# Patient Record
Sex: FEMALE | Race: WHITE | Employment: OTHER | URBAN - METROPOLITAN AREA
[De-identification: names, ages, dates, MRNs, and addresses within clinical notes are randomized per-mention and may not be internally consistent; named-entity substitution may affect disease eponyms.]

---

## 2017-01-18 ENCOUNTER — TELEPHONE (OUTPATIENT)
Dept: CARDIOLOGY | Age: 82
End: 2017-01-18

## 2017-01-23 ENCOUNTER — TELEPHONE (OUTPATIENT)
Dept: CARDIOLOGY | Age: 82
End: 2017-01-23

## 2017-02-21 RX ORDER — DILTIAZEM HYDROCHLORIDE 120 MG/1
CAPSULE, EXTENDED RELEASE ORAL
Qty: 90 CAPSULE | Refills: 2 | Status: SHIPPED | OUTPATIENT
Start: 2017-02-21 | End: 2017-06-23 | Stop reason: SDUPTHER

## 2017-06-13 ENCOUNTER — HOSPITAL ENCOUNTER (OUTPATIENT)
Dept: CT IMAGING | Age: 82
Discharge: HOME | End: 2017-06-13
Payer: MEDICARE

## 2017-06-13 VITALS
DIASTOLIC BLOOD PRESSURE: 97 MMHG | HEART RATE: 71 BPM | HEIGHT: 60 IN | WEIGHT: 166 LBS | SYSTOLIC BLOOD PRESSURE: 158 MMHG | BODY MASS INDEX: 32.59 KG/M2 | RESPIRATION RATE: 16 BRPM

## 2017-06-13 DIAGNOSIS — I71.21 ASCENDING AORTIC ANEURYSM: ICD-10-CM

## 2017-06-13 LAB
GFR AFRICAN AMERICAN: >60
GFR NON-AFRICAN AMERICAN: >60
PERFORMED ON: NORMAL
POC CREATININE: 0.6 MG/DL (ref 0.6–1.2)
POC SAMPLE TYPE: NORMAL

## 2017-06-13 PROCEDURE — 6360000004 HC RX CONTRAST MEDICATION: Performed by: RADIOLOGY

## 2017-06-13 PROCEDURE — 71260 CT THORAX DX C+: CPT

## 2017-06-13 RX ORDER — SODIUM CHLORIDE 0.9 % (FLUSH) 0.9 %
10 SYRINGE (ML) INJECTION
Status: DISPENSED | OUTPATIENT
Start: 2017-06-13 | End: 2017-06-13

## 2017-06-13 RX ADMIN — IOPAMIDOL 100 ML: 755 INJECTION, SOLUTION INTRAVENOUS at 09:55

## 2017-06-23 ENCOUNTER — OFFICE VISIT (OUTPATIENT)
Dept: CARDIOLOGY | Age: 82
End: 2017-06-23

## 2017-06-23 VITALS
HEART RATE: 64 BPM | BODY MASS INDEX: 33.79 KG/M2 | RESPIRATION RATE: 16 BRPM | WEIGHT: 173 LBS | SYSTOLIC BLOOD PRESSURE: 130 MMHG | DIASTOLIC BLOOD PRESSURE: 82 MMHG | OXYGEN SATURATION: 95 %

## 2017-06-23 DIAGNOSIS — I71.9 DESCENDING AORTIC ANEURYSM (HCC): ICD-10-CM

## 2017-06-23 DIAGNOSIS — I71.21 ASCENDING AORTIC ANEURYSM: ICD-10-CM

## 2017-06-23 DIAGNOSIS — I10 ESSENTIAL HYPERTENSION: Primary | ICD-10-CM

## 2017-06-23 PROCEDURE — 99213 OFFICE O/P EST LOW 20 MIN: CPT | Performed by: INTERNAL MEDICINE

## 2017-06-23 PROCEDURE — 1123F ACP DISCUSS/DSCN MKR DOCD: CPT | Performed by: INTERNAL MEDICINE

## 2017-06-23 PROCEDURE — G8427 DOCREV CUR MEDS BY ELIG CLIN: HCPCS | Performed by: INTERNAL MEDICINE

## 2017-06-23 PROCEDURE — G8399 PT W/DXA RESULTS DOCUMENT: HCPCS | Performed by: INTERNAL MEDICINE

## 2017-06-23 PROCEDURE — 1036F TOBACCO NON-USER: CPT | Performed by: INTERNAL MEDICINE

## 2017-06-23 PROCEDURE — 1090F PRES/ABSN URINE INCON ASSESS: CPT | Performed by: INTERNAL MEDICINE

## 2017-06-23 PROCEDURE — G8419 CALC BMI OUT NRM PARAM NOF/U: HCPCS | Performed by: INTERNAL MEDICINE

## 2017-06-23 PROCEDURE — 4040F PNEUMOC VAC/ADMIN/RCVD: CPT | Performed by: INTERNAL MEDICINE

## 2017-06-23 RX ORDER — LOSARTAN POTASSIUM 50 MG/1
50 TABLET ORAL 2 TIMES DAILY
Status: ON HOLD | COMMUNITY
Start: 2017-04-27 | End: 2021-03-01 | Stop reason: HOSPADM

## 2017-06-27 ENCOUNTER — TELEPHONE (OUTPATIENT)
Dept: CARDIOLOGY | Age: 82
End: 2017-06-27

## 2017-06-27 DIAGNOSIS — R06.02 SHORTNESS OF BREATH: ICD-10-CM

## 2017-06-27 DIAGNOSIS — R53.83 FATIGUE, UNSPECIFIED TYPE: ICD-10-CM

## 2017-06-27 DIAGNOSIS — I71.21 ASCENDING AORTIC ANEURYSM: ICD-10-CM

## 2017-06-27 DIAGNOSIS — Z74.09 IMPAIRED MOBILITY AND ACTIVITIES OF DAILY LIVING: ICD-10-CM

## 2017-06-27 DIAGNOSIS — Z78.9 IMPAIRED MOBILITY AND ACTIVITIES OF DAILY LIVING: ICD-10-CM

## 2017-06-27 DIAGNOSIS — I10 ESSENTIAL HYPERTENSION: Primary | ICD-10-CM

## 2017-06-27 RX ORDER — DILTIAZEM HYDROCHLORIDE 120 MG/1
CAPSULE, EXTENDED RELEASE ORAL
Qty: 90 CAPSULE | Refills: 3 | Status: SHIPPED | OUTPATIENT
Start: 2017-06-27 | End: 2017-08-11 | Stop reason: DRUGHIGH

## 2017-07-12 ENCOUNTER — HOSPITAL ENCOUNTER (OUTPATIENT)
Dept: NUCLEAR MEDICINE | Age: 82
Discharge: HOME | End: 2017-07-12
Payer: MEDICARE

## 2017-07-12 DIAGNOSIS — Z74.09 IMPAIRED MOBILITY AND ACTIVITIES OF DAILY LIVING: ICD-10-CM

## 2017-07-12 DIAGNOSIS — Z78.9 IMPAIRED MOBILITY AND ACTIVITIES OF DAILY LIVING: ICD-10-CM

## 2017-07-12 DIAGNOSIS — I10 ESSENTIAL HYPERTENSION: ICD-10-CM

## 2017-07-12 DIAGNOSIS — R53.83 FATIGUE, UNSPECIFIED TYPE: ICD-10-CM

## 2017-07-12 DIAGNOSIS — R06.02 SHORTNESS OF BREATH: ICD-10-CM

## 2017-07-12 PROCEDURE — 93017 CV STRESS TEST TRACING ONLY: CPT

## 2017-07-12 PROCEDURE — 6360000002 HC RX W HCPCS: Performed by: INTERNAL MEDICINE

## 2017-07-12 PROCEDURE — 2580000003 HC RX 258: Performed by: INTERNAL MEDICINE

## 2017-07-12 PROCEDURE — 3430000000 HC RX DIAGNOSTIC RADIOPHARMACEUTICAL: Performed by: INTERNAL MEDICINE

## 2017-07-12 PROCEDURE — A9502 TC99M TETROFOSMIN: HCPCS | Performed by: INTERNAL MEDICINE

## 2017-07-12 PROCEDURE — 78452 HT MUSCLE IMAGE SPECT MULT: CPT

## 2017-07-12 RX ORDER — SODIUM CHLORIDE 0.9 % (FLUSH) 0.9 %
10 SYRINGE (ML) INJECTION PRN
Status: DISCONTINUED | OUTPATIENT
Start: 2017-07-12 | End: 2017-07-15 | Stop reason: HOSPADM

## 2017-07-12 RX ADMIN — REGADENOSON 0.4 MG: 0.08 INJECTION, SOLUTION INTRAVENOUS at 13:07

## 2017-07-12 RX ADMIN — Medication 10 ML: at 11:51

## 2017-07-12 RX ADMIN — Medication 20 ML: at 13:07

## 2017-07-12 RX ADMIN — TETROFOSMIN 11.5 MILLICURIE: 0.23 INJECTION, POWDER, LYOPHILIZED, FOR SOLUTION INTRAVENOUS at 11:05

## 2017-07-12 RX ADMIN — TETROFOSMIN 28.8 MILLICURIE: 0.23 INJECTION, POWDER, LYOPHILIZED, FOR SOLUTION INTRAVENOUS at 13:07

## 2017-07-13 PROCEDURE — 93018 CV STRESS TEST I&R ONLY: CPT | Performed by: INTERNAL MEDICINE

## 2017-07-19 ENCOUNTER — HOSPITAL ENCOUNTER (INPATIENT)
Age: 82
LOS: 6 days | Discharge: HOME | DRG: 273 | End: 2017-07-26
Attending: INTERNAL MEDICINE | Admitting: INTERNAL MEDICINE
Payer: MEDICARE

## 2017-07-19 ENCOUNTER — APPOINTMENT (OUTPATIENT)
Dept: GENERAL RADIOLOGY | Age: 82
DRG: 273 | End: 2017-07-19
Payer: MEDICARE

## 2017-07-19 ENCOUNTER — APPOINTMENT (OUTPATIENT)
Dept: CT IMAGING | Age: 82
DRG: 273 | End: 2017-07-19
Payer: MEDICARE

## 2017-07-19 DIAGNOSIS — R11.0 NAUSEA: ICD-10-CM

## 2017-07-19 DIAGNOSIS — G95.9 MYELOPATHY (HCC): ICD-10-CM

## 2017-07-19 DIAGNOSIS — I71.9 DESCENDING AORTIC ANEURYSM (HCC): ICD-10-CM

## 2017-07-19 DIAGNOSIS — R07.89 ATYPICAL CHEST PAIN: Primary | ICD-10-CM

## 2017-07-19 DIAGNOSIS — R42 DIZZINESS: ICD-10-CM

## 2017-07-19 PROBLEM — R77.8 ELEVATED TROPONIN: Status: ACTIVE | Noted: 2017-07-19

## 2017-07-19 LAB
ALBUMIN SERPL-MCNC: 3.7 G/DL (ref 3.9–4.9)
ALP BLD-CCNC: 74 U/L (ref 40–130)
ALT SERPL-CCNC: 23 U/L (ref 0–33)
ANION GAP SERPL CALCULATED.3IONS-SCNC: 11 MEQ/L (ref 7–13)
APTT: 24.5 SEC (ref 21.6–35.4)
AST SERPL-CCNC: 28 U/L (ref 0–35)
BILIRUB SERPL-MCNC: 0.4 MG/DL (ref 0–1.2)
BUN BLDV-MCNC: 23 MG/DL (ref 8–23)
CALCIUM SERPL-MCNC: 8.9 MG/DL (ref 8.6–10.2)
CHLORIDE BLD-SCNC: 107 MEQ/L (ref 98–107)
CO2: 22 MEQ/L (ref 22–29)
CREAT SERPL-MCNC: 0.64 MG/DL (ref 0.5–0.9)
D DIMER: 1.08 MG/L FEU (ref 0–0.5)
GFR AFRICAN AMERICAN: >60
GFR NON-AFRICAN AMERICAN: >60
GLOBULIN: 2.5 G/DL (ref 2.3–3.5)
GLUCOSE BLD-MCNC: 119 MG/DL (ref 74–109)
HCT VFR BLD CALC: 39.5 % (ref 37–47)
HCT VFR BLD CALC: 42.9 % (ref 37–47)
HEMOGLOBIN: 13 G/DL (ref 12–16)
HEMOGLOBIN: 14.1 G/DL (ref 12–16)
INR BLD: 1
INR BLD: 1
LV EF: 50 %
LVEF MODALITY: NORMAL
MCH RBC QN AUTO: 29.2 PG (ref 27–31.3)
MCH RBC QN AUTO: 29.4 PG (ref 27–31.3)
MCHC RBC AUTO-ENTMCNC: 32.9 % (ref 33–37)
MCHC RBC AUTO-ENTMCNC: 32.9 % (ref 33–37)
MCV RBC AUTO: 88.6 FL (ref 82–100)
MCV RBC AUTO: 89.5 FL (ref 82–100)
PDW BLD-RTO: 13.6 % (ref 11.5–14.5)
PDW BLD-RTO: 13.6 % (ref 11.5–14.5)
PLATELET # BLD: 159 K/UL (ref 130–400)
PLATELET # BLD: 173 K/UL (ref 130–400)
POTASSIUM SERPL-SCNC: 4 MEQ/L (ref 3.5–5.1)
PRO-BNP: 1691 PG/ML
PROTHROMBIN TIME: 10.6 SEC (ref 8.1–13.7)
PROTHROMBIN TIME: 10.9 SEC (ref 8.1–13.7)
RBC # BLD: 4.41 M/UL (ref 4.2–5.4)
RBC # BLD: 4.84 M/UL (ref 4.2–5.4)
SODIUM BLD-SCNC: 140 MEQ/L (ref 132–144)
TOTAL CK: 91 U/L (ref 0–170)
TOTAL PROTEIN: 6.2 G/DL (ref 6.4–8.1)
TROPONIN: 0.04 NG/ML (ref 0–0.01)
TROPONIN: 0.17 NG/ML (ref 0–0.01)
TROPONIN: 0.2 NG/ML (ref 0–0.01)
TSH SERPL DL<=0.05 MIU/L-ACNC: 3.29 UIU/ML (ref 0.27–4.2)
WBC # BLD: 8.1 K/UL (ref 4.8–10.8)
WBC # BLD: 8.6 K/UL (ref 4.8–10.8)

## 2017-07-19 PROCEDURE — 87077 CULTURE AEROBIC IDENTIFY: CPT

## 2017-07-19 PROCEDURE — 99284 EMERGENCY DEPT VISIT MOD MDM: CPT

## 2017-07-19 PROCEDURE — 85379 FIBRIN DEGRADATION QUANT: CPT

## 2017-07-19 PROCEDURE — 80053 COMPREHEN METABOLIC PANEL: CPT

## 2017-07-19 PROCEDURE — 99233 SBSQ HOSP IP/OBS HIGH 50: CPT | Performed by: INTERNAL MEDICINE

## 2017-07-19 PROCEDURE — 70450 CT HEAD/BRAIN W/O DYE: CPT

## 2017-07-19 PROCEDURE — 85027 COMPLETE CBC AUTOMATED: CPT

## 2017-07-19 PROCEDURE — 2580000003 HC RX 258: Performed by: PHYSICIAN ASSISTANT

## 2017-07-19 PROCEDURE — 83880 ASSAY OF NATRIURETIC PEPTIDE: CPT

## 2017-07-19 PROCEDURE — 84443 ASSAY THYROID STIM HORMONE: CPT

## 2017-07-19 PROCEDURE — 84484 ASSAY OF TROPONIN QUANT: CPT

## 2017-07-19 PROCEDURE — G0378 HOSPITAL OBSERVATION PER HR: HCPCS

## 2017-07-19 PROCEDURE — 93005 ELECTROCARDIOGRAM TRACING: CPT

## 2017-07-19 PROCEDURE — 93306 TTE W/DOPPLER COMPLETE: CPT

## 2017-07-19 PROCEDURE — 6370000000 HC RX 637 (ALT 250 FOR IP): Performed by: PHYSICIAN ASSISTANT

## 2017-07-19 PROCEDURE — 85610 PROTHROMBIN TIME: CPT

## 2017-07-19 PROCEDURE — 71010 XR CHEST PORTABLE: CPT

## 2017-07-19 PROCEDURE — 87186 SC STD MICRODIL/AGAR DIL: CPT

## 2017-07-19 PROCEDURE — 87086 URINE CULTURE/COLONY COUNT: CPT

## 2017-07-19 PROCEDURE — 85730 THROMBOPLASTIN TIME PARTIAL: CPT

## 2017-07-19 PROCEDURE — 96372 THER/PROPH/DIAG INJ SC/IM: CPT

## 2017-07-19 PROCEDURE — 6360000002 HC RX W HCPCS: Performed by: PHYSICIAN ASSISTANT

## 2017-07-19 PROCEDURE — 82550 ASSAY OF CK (CPK): CPT

## 2017-07-19 PROCEDURE — 36415 COLL VENOUS BLD VENIPUNCTURE: CPT

## 2017-07-19 RX ORDER — NITROGLYCERIN 0.4 MG/1
0.4 TABLET SUBLINGUAL EVERY 5 MIN PRN
Status: DISCONTINUED | OUTPATIENT
Start: 2017-07-19 | End: 2017-07-26 | Stop reason: HOSPADM

## 2017-07-19 RX ORDER — SODIUM CHLORIDE 0.9 % (FLUSH) 0.9 %
10 SYRINGE (ML) INJECTION EVERY 12 HOURS SCHEDULED
Status: DISCONTINUED | OUTPATIENT
Start: 2017-07-19 | End: 2017-07-20

## 2017-07-19 RX ORDER — CITALOPRAM 20 MG/1
20 TABLET ORAL DAILY
Status: DISCONTINUED | OUTPATIENT
Start: 2017-07-19 | End: 2017-07-22

## 2017-07-19 RX ORDER — SODIUM CHLORIDE 9 MG/ML
INJECTION, SOLUTION INTRAVENOUS CONTINUOUS
Status: DISCONTINUED | OUTPATIENT
Start: 2017-07-20 | End: 2017-07-20

## 2017-07-19 RX ORDER — ACETAMINOPHEN 325 MG/1
650 TABLET ORAL EVERY 4 HOURS PRN
Status: DISCONTINUED | OUTPATIENT
Start: 2017-07-19 | End: 2017-07-26 | Stop reason: HOSPADM

## 2017-07-19 RX ORDER — HYDROCHLOROTHIAZIDE 25 MG/1
25 TABLET ORAL DAILY
Status: DISCONTINUED | OUTPATIENT
Start: 2017-07-19 | End: 2017-07-26 | Stop reason: HOSPADM

## 2017-07-19 RX ORDER — SODIUM CHLORIDE 9 MG/ML
INJECTION, SOLUTION INTRAVENOUS CONTINUOUS
Status: DISCONTINUED | OUTPATIENT
Start: 2017-07-19 | End: 2017-07-19

## 2017-07-19 RX ORDER — ASPIRIN 81 MG/1
324 TABLET, CHEWABLE ORAL ONCE
Status: COMPLETED | OUTPATIENT
Start: 2017-07-19 | End: 2017-07-19

## 2017-07-19 RX ORDER — SODIUM CHLORIDE 0.9 % (FLUSH) 0.9 %
10 SYRINGE (ML) INJECTION PRN
Status: DISCONTINUED | OUTPATIENT
Start: 2017-07-19 | End: 2017-07-20

## 2017-07-19 RX ORDER — LEVOTHYROXINE SODIUM 88 UG/1
88 TABLET ORAL DAILY
Status: DISCONTINUED | OUTPATIENT
Start: 2017-07-19 | End: 2017-07-22

## 2017-07-19 RX ORDER — ASPIRIN 81 MG/1
81 TABLET, CHEWABLE ORAL DAILY
Status: DISCONTINUED | OUTPATIENT
Start: 2017-07-19 | End: 2017-07-26 | Stop reason: HOSPADM

## 2017-07-19 RX ORDER — MORPHINE SULFATE 2 MG/ML
2 INJECTION, SOLUTION INTRAMUSCULAR; INTRAVENOUS
Status: DISCONTINUED | OUTPATIENT
Start: 2017-07-19 | End: 2017-07-26 | Stop reason: HOSPADM

## 2017-07-19 RX ORDER — ONDANSETRON 2 MG/ML
4 INJECTION INTRAMUSCULAR; INTRAVENOUS EVERY 6 HOURS PRN
Status: DISCONTINUED | OUTPATIENT
Start: 2017-07-19 | End: 2017-07-20

## 2017-07-19 RX ORDER — SODIUM CHLORIDE 0.9 % (FLUSH) 0.9 %
10 SYRINGE (ML) INJECTION EVERY 12 HOURS SCHEDULED
Status: DISCONTINUED | OUTPATIENT
Start: 2017-07-19 | End: 2017-07-26 | Stop reason: HOSPADM

## 2017-07-19 RX ORDER — DIPHENHYDRAMINE HCL 25 MG
50 TABLET ORAL ONCE
Status: DISCONTINUED | OUTPATIENT
Start: 2017-07-19 | End: 2017-07-26 | Stop reason: HOSPADM

## 2017-07-19 RX ORDER — ALPRAZOLAM 0.5 MG/1
0.5 TABLET ORAL
Status: ACTIVE | OUTPATIENT
Start: 2017-07-19 | End: 2017-07-19

## 2017-07-19 RX ORDER — ASPIRIN 81 MG/1
81 TABLET ORAL ONCE
Status: DISCONTINUED | OUTPATIENT
Start: 2017-07-19 | End: 2017-07-19 | Stop reason: SDUPTHER

## 2017-07-19 RX ORDER — LOSARTAN POTASSIUM 50 MG/1
50 TABLET ORAL 2 TIMES DAILY
Status: DISCONTINUED | OUTPATIENT
Start: 2017-07-19 | End: 2017-07-22

## 2017-07-19 RX ORDER — ALENDRONATE SODIUM 70 MG/1
70 TABLET ORAL
Status: DISCONTINUED | OUTPATIENT
Start: 2017-07-19 | End: 2017-07-19

## 2017-07-19 RX ORDER — DILTIAZEM HYDROCHLORIDE 120 MG/1
120 CAPSULE, COATED, EXTENDED RELEASE ORAL DAILY
Status: DISCONTINUED | OUTPATIENT
Start: 2017-07-19 | End: 2017-07-22

## 2017-07-19 RX ORDER — MORPHINE SULFATE 4 MG/ML
4 INJECTION, SOLUTION INTRAMUSCULAR; INTRAVENOUS
Status: DISCONTINUED | OUTPATIENT
Start: 2017-07-19 | End: 2017-07-26 | Stop reason: HOSPADM

## 2017-07-19 RX ORDER — GABAPENTIN 100 MG/1
100 CAPSULE ORAL EVERY EVENING
Status: DISCONTINUED | OUTPATIENT
Start: 2017-07-19 | End: 2017-07-26 | Stop reason: HOSPADM

## 2017-07-19 RX ADMIN — NITROGLYCERIN 0.5 INCH: 20 OINTMENT TOPICAL at 13:48

## 2017-07-19 RX ADMIN — ASPIRIN 81 MG 324 MG: 81 TABLET ORAL at 13:46

## 2017-07-19 RX ADMIN — ENOXAPARIN SODIUM 80 MG: 80 INJECTION SUBCUTANEOUS at 21:21

## 2017-07-19 RX ADMIN — LOSARTAN POTASSIUM 50 MG: 50 TABLET ORAL at 21:22

## 2017-07-19 RX ADMIN — Medication 10 ML: at 21:23

## 2017-07-19 RX ADMIN — SODIUM CHLORIDE: 9 INJECTION, SOLUTION INTRAVENOUS at 12:24

## 2017-07-19 RX ADMIN — GABAPENTIN 100 MG: 100 CAPSULE ORAL at 21:22

## 2017-07-19 RX ADMIN — ASPIRIN 81 MG CHEWABLE TABLET 81 MG: 81 TABLET CHEWABLE at 21:24

## 2017-07-19 ASSESSMENT — ENCOUNTER SYMPTOMS
EYE DISCHARGE: 0
COUGH: 0
NAUSEA: 0
CONSTIPATION: 0
CHOKING: 0
STRIDOR: 0
RHINORRHEA: 0
ABDOMINAL DISTENTION: 0
NAUSEA: 1
SHORTNESS OF BREATH: 1
COLOR CHANGE: 0
SORE THROAT: 0
VOMITING: 0
SHORTNESS OF BREATH: 0
WHEEZING: 0
ABDOMINAL PAIN: 0
APNEA: 0
CHEST TIGHTNESS: 0

## 2017-07-19 ASSESSMENT — PAIN DESCRIPTION - FREQUENCY: FREQUENCY: CONTINUOUS

## 2017-07-19 ASSESSMENT — PAIN SCALES - GENERAL
PAINLEVEL_OUTOF10: 0
PAINLEVEL_OUTOF10: 0
PAINLEVEL_OUTOF10: 2
PAINLEVEL_OUTOF10: 8

## 2017-07-19 ASSESSMENT — PAIN DESCRIPTION - DESCRIPTORS: DESCRIPTORS: HEADACHE;SQUEEZING

## 2017-07-19 ASSESSMENT — PAIN DESCRIPTION - PAIN TYPE: TYPE: ACUTE PAIN

## 2017-07-20 ENCOUNTER — APPOINTMENT (OUTPATIENT)
Dept: CARDIAC CATH/INVASIVE PROCEDURES | Age: 82
DRG: 273 | End: 2017-07-20
Payer: MEDICARE

## 2017-07-20 ENCOUNTER — APPOINTMENT (OUTPATIENT)
Dept: CT IMAGING | Age: 82
DRG: 273 | End: 2017-07-20
Payer: MEDICARE

## 2017-07-20 LAB
ANION GAP SERPL CALCULATED.3IONS-SCNC: 6 MEQ/L (ref 7–13)
BUN BLDV-MCNC: 17 MG/DL (ref 8–23)
CALCIUM SERPL-MCNC: 8.3 MG/DL (ref 8.6–10.2)
CHLORIDE BLD-SCNC: 106 MEQ/L (ref 98–107)
CHOLESTEROL, TOTAL: 161 MG/DL (ref 0–199)
CO2: 27 MEQ/L (ref 22–29)
CREAT SERPL-MCNC: 0.44 MG/DL (ref 0.5–0.9)
GFR AFRICAN AMERICAN: >60
GFR NON-AFRICAN AMERICAN: >60
GLUCOSE BLD-MCNC: 88 MG/DL (ref 74–109)
HCT VFR BLD CALC: 41.7 % (ref 37–47)
HDLC SERPL-MCNC: 47 MG/DL (ref 40–59)
HEMOGLOBIN: 13.5 G/DL (ref 12–16)
INR BLD: 1
LDL CHOLESTEROL CALCULATED: 92 MG/DL (ref 0–129)
MCH RBC QN AUTO: 28.7 PG (ref 27–31.3)
MCHC RBC AUTO-ENTMCNC: 32.5 % (ref 33–37)
MCV RBC AUTO: 88.4 FL (ref 82–100)
PDW BLD-RTO: 13.6 % (ref 11.5–14.5)
PLATELET # BLD: 152 K/UL (ref 130–400)
POTASSIUM SERPL-SCNC: 3.4 MEQ/L (ref 3.5–5.1)
PROTHROMBIN TIME: 11.2 SEC (ref 8.1–13.7)
RBC # BLD: 4.71 M/UL (ref 4.2–5.4)
SODIUM BLD-SCNC: 139 MEQ/L (ref 132–144)
TRIGL SERPL-MCNC: 109 MG/DL (ref 0–200)
WBC # BLD: 7.7 K/UL (ref 4.8–10.8)

## 2017-07-20 PROCEDURE — 2580000003 HC RX 258

## 2017-07-20 PROCEDURE — C1725 CATH, TRANSLUMIN NON-LASER: HCPCS

## 2017-07-20 PROCEDURE — C1769 GUIDE WIRE: HCPCS

## 2017-07-20 PROCEDURE — 6360000004 HC RX CONTRAST MEDICATION: Performed by: RADIOLOGY

## 2017-07-20 PROCEDURE — C1894 INTRO/SHEATH, NON-LASER: HCPCS

## 2017-07-20 PROCEDURE — 2720000010 HC SURG SUPPLY STERILE

## 2017-07-20 PROCEDURE — 4A033BC MEASUREMENT OF ARTERIAL PRESSURE, CORONARY, PERCUTANEOUS APPROACH: ICD-10-PCS | Performed by: INTERNAL MEDICINE

## 2017-07-20 PROCEDURE — 80048 BASIC METABOLIC PNL TOTAL CA: CPT

## 2017-07-20 PROCEDURE — 2700000000 HC OXYGEN THERAPY PER DAY

## 2017-07-20 PROCEDURE — 2580000003 HC RX 258: Performed by: PHYSICIAN ASSISTANT

## 2017-07-20 PROCEDURE — B2111ZZ FLUOROSCOPY OF MULTIPLE CORONARY ARTERIES USING LOW OSMOLAR CONTRAST: ICD-10-PCS | Performed by: INTERNAL MEDICINE

## 2017-07-20 PROCEDURE — 6370000000 HC RX 637 (ALT 250 FOR IP): Performed by: INTERNAL MEDICINE

## 2017-07-20 PROCEDURE — 93005 ELECTROCARDIOGRAM TRACING: CPT

## 2017-07-20 PROCEDURE — 80061 LIPID PANEL: CPT

## 2017-07-20 PROCEDURE — 4A023N7 MEASUREMENT OF CARDIAC SAMPLING AND PRESSURE, LEFT HEART, PERCUTANEOUS APPROACH: ICD-10-PCS | Performed by: INTERNAL MEDICINE

## 2017-07-20 PROCEDURE — 93458 L HRT ARTERY/VENTRICLE ANGIO: CPT | Performed by: INTERNAL MEDICINE

## 2017-07-20 PROCEDURE — 93571 IV DOP VEL&/PRESS C FLO 1ST: CPT | Performed by: INTERNAL MEDICINE

## 2017-07-20 PROCEDURE — 85610 PROTHROMBIN TIME: CPT

## 2017-07-20 PROCEDURE — 6360000002 HC RX W HCPCS

## 2017-07-20 PROCEDURE — 6370000000 HC RX 637 (ALT 250 FOR IP): Performed by: NURSE PRACTITIONER

## 2017-07-20 PROCEDURE — 85027 COMPLETE CBC AUTOMATED: CPT

## 2017-07-20 PROCEDURE — 36415 COLL VENOUS BLD VENIPUNCTURE: CPT

## 2017-07-20 PROCEDURE — 71275 CT ANGIOGRAPHY CHEST: CPT

## 2017-07-20 PROCEDURE — 2060000000 HC ICU INTERMEDIATE R&B

## 2017-07-20 PROCEDURE — 2500000003 HC RX 250 WO HCPCS

## 2017-07-20 PROCEDURE — 6370000000 HC RX 637 (ALT 250 FOR IP): Performed by: PHYSICIAN ASSISTANT

## 2017-07-20 PROCEDURE — B2151ZZ FLUOROSCOPY OF LEFT HEART USING LOW OSMOLAR CONTRAST: ICD-10-PCS | Performed by: INTERNAL MEDICINE

## 2017-07-20 RX ORDER — POTASSIUM CHLORIDE 20 MEQ/1
40 TABLET, EXTENDED RELEASE ORAL ONCE
Status: COMPLETED | OUTPATIENT
Start: 2017-07-20 | End: 2017-07-20

## 2017-07-20 RX ORDER — ONDANSETRON 2 MG/ML
4 INJECTION INTRAMUSCULAR; INTRAVENOUS EVERY 6 HOURS PRN
Status: DISCONTINUED | OUTPATIENT
Start: 2017-07-20 | End: 2017-07-26 | Stop reason: HOSPADM

## 2017-07-20 RX ORDER — SODIUM CHLORIDE 9 MG/ML
INJECTION, SOLUTION INTRAVENOUS CONTINUOUS
Status: ACTIVE | OUTPATIENT
Start: 2017-07-20 | End: 2017-07-20

## 2017-07-20 RX ORDER — LABETALOL HYDROCHLORIDE 5 MG/ML
10 INJECTION, SOLUTION INTRAVENOUS EVERY 30 MIN PRN
Status: DISCONTINUED | OUTPATIENT
Start: 2017-07-20 | End: 2017-07-26 | Stop reason: HOSPADM

## 2017-07-20 RX ORDER — ACETAMINOPHEN 325 MG/1
650 TABLET ORAL EVERY 4 HOURS PRN
Status: DISCONTINUED | OUTPATIENT
Start: 2017-07-20 | End: 2017-07-26 | Stop reason: HOSPADM

## 2017-07-20 RX ORDER — HYDRALAZINE HYDROCHLORIDE 20 MG/ML
10 INJECTION INTRAMUSCULAR; INTRAVENOUS EVERY 10 MIN PRN
Status: DISCONTINUED | OUTPATIENT
Start: 2017-07-20 | End: 2017-07-26 | Stop reason: HOSPADM

## 2017-07-20 RX ADMIN — HYDROCHLOROTHIAZIDE 25 MG: 25 TABLET ORAL at 07:55

## 2017-07-20 RX ADMIN — POTASSIUM CHLORIDE 40 MEQ: 1500 TABLET, EXTENDED RELEASE ORAL at 07:58

## 2017-07-20 RX ADMIN — SODIUM CHLORIDE: 9 INJECTION, SOLUTION INTRAVENOUS at 10:05

## 2017-07-20 RX ADMIN — ASPIRIN 81 MG CHEWABLE TABLET 81 MG: 81 TABLET CHEWABLE at 07:55

## 2017-07-20 RX ADMIN — IOPAMIDOL 100 ML: 755 INJECTION, SOLUTION INTRAVENOUS at 08:32

## 2017-07-20 RX ADMIN — GABAPENTIN 100 MG: 100 CAPSULE ORAL at 21:35

## 2017-07-20 RX ADMIN — CITALOPRAM 20 MG: 20 TABLET ORAL at 07:55

## 2017-07-20 RX ADMIN — ACETAMINOPHEN 650 MG: 325 TABLET ORAL at 20:22

## 2017-07-20 RX ADMIN — LOSARTAN POTASSIUM 50 MG: 50 TABLET ORAL at 07:55

## 2017-07-20 RX ADMIN — LEVOTHYROXINE SODIUM 88 MCG: 88 TABLET ORAL at 07:55

## 2017-07-20 RX ADMIN — SODIUM CHLORIDE, PRESERVATIVE FREE 10 ML: 5 INJECTION INTRAVENOUS at 08:32

## 2017-07-20 RX ADMIN — DILTIAZEM HYDROCHLORIDE 120 MG: 120 CAPSULE, COATED, EXTENDED RELEASE ORAL at 07:55

## 2017-07-20 ASSESSMENT — PAIN SCALES - GENERAL
PAINLEVEL_OUTOF10: 0
PAINLEVEL_OUTOF10: 0
PAINLEVEL_OUTOF10: 6
PAINLEVEL_OUTOF10: 0
PAINLEVEL_OUTOF10: 0

## 2017-07-21 ENCOUNTER — APPOINTMENT (OUTPATIENT)
Dept: ULTRASOUND IMAGING | Age: 82
DRG: 273 | End: 2017-07-21
Payer: MEDICARE

## 2017-07-21 LAB
ANION GAP SERPL CALCULATED.3IONS-SCNC: 12 MEQ/L (ref 7–13)
BUN BLDV-MCNC: 14 MG/DL (ref 8–23)
CALCIUM SERPL-MCNC: 9.1 MG/DL (ref 8.6–10.2)
CHLORIDE BLD-SCNC: 104 MEQ/L (ref 98–107)
CO2: 24 MEQ/L (ref 22–29)
CREAT SERPL-MCNC: 0.6 MG/DL (ref 0.5–0.9)
GFR AFRICAN AMERICAN: >60
GFR NON-AFRICAN AMERICAN: >60
GLUCOSE BLD-MCNC: 134 MG/DL (ref 74–109)
MAGNESIUM: 1.9 MG/DL (ref 1.7–2.3)
POTASSIUM SERPL-SCNC: 3.6 MEQ/L (ref 3.5–5.1)
SODIUM BLD-SCNC: 140 MEQ/L (ref 132–144)

## 2017-07-21 PROCEDURE — 93005 ELECTROCARDIOGRAM TRACING: CPT

## 2017-07-21 PROCEDURE — 6360000002 HC RX W HCPCS: Performed by: PHYSICIAN ASSISTANT

## 2017-07-21 PROCEDURE — 83735 ASSAY OF MAGNESIUM: CPT

## 2017-07-21 PROCEDURE — 36415 COLL VENOUS BLD VENIPUNCTURE: CPT

## 2017-07-21 PROCEDURE — 6370000000 HC RX 637 (ALT 250 FOR IP): Performed by: PHYSICIAN ASSISTANT

## 2017-07-21 PROCEDURE — 97162 PT EVAL MOD COMPLEX 30 MIN: CPT

## 2017-07-21 PROCEDURE — 6370000000 HC RX 637 (ALT 250 FOR IP): Performed by: PSYCHIATRY & NEUROLOGY

## 2017-07-21 PROCEDURE — 93880 EXTRACRANIAL BILAT STUDY: CPT

## 2017-07-21 PROCEDURE — 97165 OT EVAL LOW COMPLEX 30 MIN: CPT

## 2017-07-21 PROCEDURE — G8988 SELF CARE GOAL STATUS: HCPCS

## 2017-07-21 PROCEDURE — G8979 MOBILITY GOAL STATUS: HCPCS

## 2017-07-21 PROCEDURE — G8987 SELF CARE CURRENT STATUS: HCPCS

## 2017-07-21 PROCEDURE — 2060000000 HC ICU INTERMEDIATE R&B

## 2017-07-21 PROCEDURE — 80048 BASIC METABOLIC PNL TOTAL CA: CPT

## 2017-07-21 PROCEDURE — G8978 MOBILITY CURRENT STATUS: HCPCS

## 2017-07-21 PROCEDURE — 2580000003 HC RX 258: Performed by: PHYSICIAN ASSISTANT

## 2017-07-21 RX ORDER — MECLIZINE HYDROCHLORIDE 25 MG/1
25 TABLET ORAL 3 TIMES DAILY PRN
Status: DISCONTINUED | OUTPATIENT
Start: 2017-07-21 | End: 2017-07-21

## 2017-07-21 RX ORDER — MECLIZINE HYDROCHLORIDE 25 MG/1
25 TABLET ORAL 3 TIMES DAILY
Status: DISCONTINUED | OUTPATIENT
Start: 2017-07-21 | End: 2017-07-26 | Stop reason: HOSPADM

## 2017-07-21 RX ADMIN — HYDROCHLOROTHIAZIDE 25 MG: 25 TABLET ORAL at 08:47

## 2017-07-21 RX ADMIN — CITALOPRAM 20 MG: 20 TABLET ORAL at 08:49

## 2017-07-21 RX ADMIN — LOSARTAN POTASSIUM 50 MG: 50 TABLET ORAL at 08:53

## 2017-07-21 RX ADMIN — ENOXAPARIN SODIUM 80 MG: 80 INJECTION SUBCUTANEOUS at 08:54

## 2017-07-21 RX ADMIN — DILTIAZEM HYDROCHLORIDE 120 MG: 120 CAPSULE, COATED, EXTENDED RELEASE ORAL at 08:49

## 2017-07-21 RX ADMIN — ASPIRIN 81 MG CHEWABLE TABLET 81 MG: 81 TABLET CHEWABLE at 08:47

## 2017-07-21 RX ADMIN — Medication 10 ML: at 21:27

## 2017-07-21 RX ADMIN — ENOXAPARIN SODIUM 80 MG: 80 INJECTION SUBCUTANEOUS at 21:27

## 2017-07-21 RX ADMIN — MECLIZINE HYDROCHLORIDE 25 MG: 25 TABLET ORAL at 21:27

## 2017-07-21 RX ADMIN — GABAPENTIN 100 MG: 100 CAPSULE ORAL at 21:27

## 2017-07-21 RX ADMIN — LEVOTHYROXINE SODIUM 88 MCG: 88 TABLET ORAL at 08:53

## 2017-07-21 RX ADMIN — Medication 10 ML: at 08:54

## 2017-07-21 ASSESSMENT — PAIN SCALES - GENERAL
PAINLEVEL_OUTOF10: 0

## 2017-07-22 LAB
ANION GAP SERPL CALCULATED.3IONS-SCNC: 18 MEQ/L (ref 7–13)
BUN BLDV-MCNC: 16 MG/DL (ref 8–23)
CALCIUM SERPL-MCNC: 8.3 MG/DL (ref 8.6–10.2)
CHLORIDE BLD-SCNC: 103 MEQ/L (ref 98–107)
CO2: 20 MEQ/L (ref 22–29)
CREAT SERPL-MCNC: 0.5 MG/DL (ref 0.5–0.9)
GFR AFRICAN AMERICAN: >60
GFR NON-AFRICAN AMERICAN: >60
GLUCOSE BLD-MCNC: 121 MG/DL (ref 74–109)
MAGNESIUM: 2 MG/DL (ref 1.7–2.3)
POTASSIUM SERPL-SCNC: 3.3 MEQ/L (ref 3.5–5.1)
SODIUM BLD-SCNC: 141 MEQ/L (ref 132–144)
TROPONIN: 0.03 NG/ML (ref 0–0.01)

## 2017-07-22 PROCEDURE — 36415 COLL VENOUS BLD VENIPUNCTURE: CPT

## 2017-07-22 PROCEDURE — 6370000000 HC RX 637 (ALT 250 FOR IP): Performed by: PSYCHIATRY & NEUROLOGY

## 2017-07-22 PROCEDURE — 84484 ASSAY OF TROPONIN QUANT: CPT

## 2017-07-22 PROCEDURE — 2580000003 HC RX 258: Performed by: INTERNAL MEDICINE

## 2017-07-22 PROCEDURE — 93005 ELECTROCARDIOGRAM TRACING: CPT

## 2017-07-22 PROCEDURE — 83735 ASSAY OF MAGNESIUM: CPT

## 2017-07-22 PROCEDURE — 6360000002 HC RX W HCPCS: Performed by: PHYSICIAN ASSISTANT

## 2017-07-22 PROCEDURE — 6360000002 HC RX W HCPCS: Performed by: INTERNAL MEDICINE

## 2017-07-22 PROCEDURE — 6370000000 HC RX 637 (ALT 250 FOR IP): Performed by: SPECIALIST

## 2017-07-22 PROCEDURE — 2000000000 HC ICU R&B

## 2017-07-22 PROCEDURE — 6370000000 HC RX 637 (ALT 250 FOR IP): Performed by: PHYSICIAN ASSISTANT

## 2017-07-22 PROCEDURE — 80048 BASIC METABOLIC PNL TOTAL CA: CPT

## 2017-07-22 PROCEDURE — 2580000003 HC RX 258: Performed by: PHYSICIAN ASSISTANT

## 2017-07-22 PROCEDURE — 6360000002 HC RX W HCPCS

## 2017-07-22 PROCEDURE — 2580000003 HC RX 258

## 2017-07-22 RX ORDER — AMIODARONE HYDROCHLORIDE 50 MG/ML
INJECTION, SOLUTION INTRAVENOUS
Status: COMPLETED | OUTPATIENT
Start: 2017-07-22 | End: 2017-07-22

## 2017-07-22 RX ORDER — CITALOPRAM 20 MG/1
20 TABLET ORAL DAILY
Status: DISCONTINUED | OUTPATIENT
Start: 2017-07-22 | End: 2017-07-26 | Stop reason: HOSPADM

## 2017-07-22 RX ORDER — LEVOTHYROXINE SODIUM 88 UG/1
88 TABLET ORAL DAILY
Status: DISCONTINUED | OUTPATIENT
Start: 2017-07-22 | End: 2017-07-26 | Stop reason: HOSPADM

## 2017-07-22 RX ORDER — SODIUM CHLORIDE 9 MG/ML
INJECTION, SOLUTION INTRAVENOUS CONTINUOUS PRN
Status: COMPLETED | OUTPATIENT
Start: 2017-07-22 | End: 2017-07-22

## 2017-07-22 RX ORDER — LOSARTAN POTASSIUM 50 MG/1
50 TABLET ORAL 2 TIMES DAILY
Status: DISCONTINUED | OUTPATIENT
Start: 2017-07-22 | End: 2017-07-26 | Stop reason: HOSPADM

## 2017-07-22 RX ADMIN — LOSARTAN POTASSIUM 50 MG: 50 TABLET, FILM COATED ORAL at 14:59

## 2017-07-22 RX ADMIN — ENOXAPARIN SODIUM 80 MG: 80 INJECTION SUBCUTANEOUS at 20:43

## 2017-07-22 RX ADMIN — ENOXAPARIN SODIUM 80 MG: 80 INJECTION SUBCUTANEOUS at 11:32

## 2017-07-22 RX ADMIN — AMIODARONE HYDROCHLORIDE 1 MG/MIN: 50 INJECTION, SOLUTION INTRAVENOUS at 08:00

## 2017-07-22 RX ADMIN — MECLIZINE HYDROCHLORIDE 25 MG: 25 TABLET ORAL at 20:41

## 2017-07-22 RX ADMIN — SODIUM CHLORIDE 125 ML/HR: 900 INJECTION, SOLUTION INTRAVENOUS at 08:05

## 2017-07-22 RX ADMIN — AMIODARONE HYDROCHLORIDE 150 MG: 50 INJECTION, SOLUTION INTRAVENOUS at 08:00

## 2017-07-22 RX ADMIN — GABAPENTIN 100 MG: 100 CAPSULE ORAL at 20:41

## 2017-07-22 RX ADMIN — CITALOPRAM HYDROBROMIDE 20 MG: 20 TABLET ORAL at 14:59

## 2017-07-22 RX ADMIN — LEVOTHYROXINE SODIUM 88 MCG: 88 TABLET ORAL at 14:59

## 2017-07-22 RX ADMIN — AMIODARONE HYDROCHLORIDE 150 MG: 50 INJECTION, SOLUTION INTRAVENOUS at 08:05

## 2017-07-22 RX ADMIN — ASPIRIN 81 MG CHEWABLE TABLET 81 MG: 81 TABLET CHEWABLE at 11:31

## 2017-07-22 RX ADMIN — LOSARTAN POTASSIUM 50 MG: 50 TABLET, FILM COATED ORAL at 20:44

## 2017-07-22 RX ADMIN — MECLIZINE HYDROCHLORIDE 25 MG: 25 TABLET ORAL at 15:00

## 2017-07-22 RX ADMIN — MECLIZINE HYDROCHLORIDE 25 MG: 25 TABLET ORAL at 11:31

## 2017-07-22 RX ADMIN — HYDROCHLOROTHIAZIDE 25 MG: 25 TABLET ORAL at 11:31

## 2017-07-22 ASSESSMENT — PAIN SCALES - GENERAL
PAINLEVEL_OUTOF10: 0

## 2017-07-22 ASSESSMENT — ENCOUNTER SYMPTOMS
CHEST TIGHTNESS: 0
VOMITING: 0
APNEA: 0
COUGH: 0
COLOR CHANGE: 0
SHORTNESS OF BREATH: 1
WHEEZING: 0
NAUSEA: 0
ABDOMINAL PAIN: 0

## 2017-07-23 LAB
BACTERIA: ABNORMAL /HPF
BILIRUBIN URINE: NEGATIVE
BLOOD, URINE: ABNORMAL
CLARITY: CLEAR
COLOR: YELLOW
CRYSTALS, UA: ABNORMAL
EPITHELIAL CELLS, UA: ABNORMAL /HPF
GLUCOSE URINE: NEGATIVE MG/DL
KETONES, URINE: NEGATIVE MG/DL
LEUKOCYTE ESTERASE, URINE: ABNORMAL
NITRITE, URINE: NEGATIVE
PH UA: 6.5 (ref 5–9)
PROTEIN UA: NEGATIVE MG/DL
RBC UA: ABNORMAL /HPF (ref 0–2)
RENAL EPITHELIAL, UA: ABNORMAL /HPF
SPECIFIC GRAVITY UA: 1.01 (ref 1–1.03)
URINE REFLEX TO CULTURE: YES
UROBILINOGEN, URINE: 0.2 E.U./DL
WBC UA: ABNORMAL /HPF (ref 0–5)

## 2017-07-23 PROCEDURE — 6370000000 HC RX 637 (ALT 250 FOR IP): Performed by: PSYCHIATRY & NEUROLOGY

## 2017-07-23 PROCEDURE — 6360000002 HC RX W HCPCS: Performed by: PHYSICIAN ASSISTANT

## 2017-07-23 PROCEDURE — 2700000000 HC OXYGEN THERAPY PER DAY

## 2017-07-23 PROCEDURE — 93005 ELECTROCARDIOGRAM TRACING: CPT

## 2017-07-23 PROCEDURE — 2580000003 HC RX 258: Performed by: PHYSICIAN ASSISTANT

## 2017-07-23 PROCEDURE — 6370000000 HC RX 637 (ALT 250 FOR IP): Performed by: SPECIALIST

## 2017-07-23 PROCEDURE — 81001 URINALYSIS AUTO W/SCOPE: CPT

## 2017-07-23 PROCEDURE — 2000000000 HC ICU R&B

## 2017-07-23 PROCEDURE — 6370000000 HC RX 637 (ALT 250 FOR IP): Performed by: PHYSICIAN ASSISTANT

## 2017-07-23 RX ORDER — POTASSIUM CHLORIDE 20 MEQ/1
20 TABLET, EXTENDED RELEASE ORAL
Status: DISCONTINUED | OUTPATIENT
Start: 2017-07-23 | End: 2017-07-26 | Stop reason: HOSPADM

## 2017-07-23 RX ORDER — POTASSIUM CHLORIDE 7.45 MG/ML
20 INJECTION INTRAVENOUS ONCE
Status: DISCONTINUED | OUTPATIENT
Start: 2017-07-23 | End: 2017-07-26 | Stop reason: HOSPADM

## 2017-07-23 RX ADMIN — HYDROCHLOROTHIAZIDE 25 MG: 25 TABLET ORAL at 09:38

## 2017-07-23 RX ADMIN — LOSARTAN POTASSIUM 50 MG: 50 TABLET, FILM COATED ORAL at 20:23

## 2017-07-23 RX ADMIN — POTASSIUM CHLORIDE 20 MEQ: 20 TABLET, EXTENDED RELEASE ORAL at 17:24

## 2017-07-23 RX ADMIN — ENOXAPARIN SODIUM 80 MG: 80 INJECTION SUBCUTANEOUS at 09:38

## 2017-07-23 RX ADMIN — MECLIZINE HYDROCHLORIDE 25 MG: 25 TABLET ORAL at 14:27

## 2017-07-23 RX ADMIN — LEVOTHYROXINE SODIUM 88 MCG: 88 TABLET ORAL at 09:38

## 2017-07-23 RX ADMIN — MECLIZINE HYDROCHLORIDE 25 MG: 25 TABLET ORAL at 20:23

## 2017-07-23 RX ADMIN — Medication 10 ML: at 09:00

## 2017-07-23 RX ADMIN — LOSARTAN POTASSIUM 50 MG: 50 TABLET, FILM COATED ORAL at 09:41

## 2017-07-23 RX ADMIN — GABAPENTIN 100 MG: 100 CAPSULE ORAL at 20:23

## 2017-07-23 RX ADMIN — MECLIZINE HYDROCHLORIDE 25 MG: 25 TABLET ORAL at 09:38

## 2017-07-23 RX ADMIN — ASPIRIN 81 MG CHEWABLE TABLET 81 MG: 81 TABLET CHEWABLE at 09:38

## 2017-07-23 RX ADMIN — Medication 10 ML: at 20:23

## 2017-07-23 RX ADMIN — CITALOPRAM HYDROBROMIDE 20 MG: 20 TABLET ORAL at 09:38

## 2017-07-23 ASSESSMENT — ENCOUNTER SYMPTOMS
COUGH: 0
WHEEZING: 0
SHORTNESS OF BREATH: 1
APNEA: 0
NAUSEA: 0
CHEST TIGHTNESS: 0
ABDOMINAL PAIN: 0
VOMITING: 0
COLOR CHANGE: 0

## 2017-07-23 ASSESSMENT — PAIN SCALES - GENERAL
PAINLEVEL_OUTOF10: 0

## 2017-07-24 ENCOUNTER — APPOINTMENT (OUTPATIENT)
Dept: GENERAL RADIOLOGY | Age: 82
DRG: 273 | End: 2017-07-24
Payer: MEDICARE

## 2017-07-24 ENCOUNTER — APPOINTMENT (OUTPATIENT)
Dept: CARDIAC CATH/INVASIVE PROCEDURES | Age: 82
DRG: 273 | End: 2017-07-24
Payer: MEDICARE

## 2017-07-24 ENCOUNTER — APPOINTMENT (OUTPATIENT)
Dept: MRI IMAGING | Age: 82
DRG: 273 | End: 2017-07-24
Payer: MEDICARE

## 2017-07-24 LAB
ANION GAP SERPL CALCULATED.3IONS-SCNC: 11 MEQ/L (ref 7–13)
BUN BLDV-MCNC: 21 MG/DL (ref 8–23)
CALCIUM SERPL-MCNC: 8.7 MG/DL (ref 8.6–10.2)
CHLORIDE BLD-SCNC: 106 MEQ/L (ref 98–107)
CHOLESTEROL, TOTAL: 185 MG/DL (ref 0–199)
CO2: 24 MEQ/L (ref 22–29)
CREAT SERPL-MCNC: 0.6 MG/DL (ref 0.5–0.9)
GFR AFRICAN AMERICAN: >60
GFR NON-AFRICAN AMERICAN: >60
GLUCOSE BLD-MCNC: 96 MG/DL (ref 74–109)
HCT VFR BLD CALC: 45 % (ref 37–47)
HDLC SERPL-MCNC: 45 MG/DL (ref 40–59)
HEMOGLOBIN: 15.1 G/DL (ref 12–16)
INR BLD: 1
LDL CHOLESTEROL CALCULATED: 119 MG/DL (ref 0–129)
MCH RBC QN AUTO: 29.9 PG (ref 27–31.3)
MCHC RBC AUTO-ENTMCNC: 33.5 % (ref 33–37)
MCV RBC AUTO: 89.2 FL (ref 82–100)
PDW BLD-RTO: 13.7 % (ref 11.5–14.5)
PLATELET # BLD: 165 K/UL (ref 130–400)
POTASSIUM SERPL-SCNC: 3.4 MEQ/L (ref 3.5–5.1)
PROTHROMBIN TIME: 10.8 SEC (ref 8.1–13.7)
RBC # BLD: 5.04 M/UL (ref 4.2–5.4)
SODIUM BLD-SCNC: 141 MEQ/L (ref 132–144)
TRIGL SERPL-MCNC: 106 MG/DL (ref 0–200)
WBC # BLD: 8.4 K/UL (ref 4.8–10.8)

## 2017-07-24 PROCEDURE — 2580000003 HC RX 258: Performed by: PHYSICIAN ASSISTANT

## 2017-07-24 PROCEDURE — 93619 COMPREHENSIVE EP EVALUATION: CPT | Performed by: SPECIALIST

## 2017-07-24 PROCEDURE — 02583ZZ DESTRUCTION OF CONDUCTION MECHANISM, PERCUTANEOUS APPROACH: ICD-10-PCS | Performed by: SPECIALIST

## 2017-07-24 PROCEDURE — 93005 ELECTROCARDIOGRAM TRACING: CPT

## 2017-07-24 PROCEDURE — 6360000002 HC RX W HCPCS: Performed by: PHYSICIAN ASSISTANT

## 2017-07-24 PROCEDURE — 80061 LIPID PANEL: CPT

## 2017-07-24 PROCEDURE — 70544 MR ANGIOGRAPHY HEAD W/O DYE: CPT

## 2017-07-24 PROCEDURE — 71010 XR CHEST PORTABLE: CPT

## 2017-07-24 PROCEDURE — 70551 MRI BRAIN STEM W/O DYE: CPT

## 2017-07-24 PROCEDURE — 36415 COLL VENOUS BLD VENIPUNCTURE: CPT

## 2017-07-24 PROCEDURE — 6370000000 HC RX 637 (ALT 250 FOR IP): Performed by: PHYSICIAN ASSISTANT

## 2017-07-24 PROCEDURE — C1730 CATH, EP, 19 OR FEW ELECT: HCPCS

## 2017-07-24 PROCEDURE — 85027 COMPLETE CBC AUTOMATED: CPT

## 2017-07-24 PROCEDURE — 6370000000 HC RX 637 (ALT 250 FOR IP): Performed by: SPECIALIST

## 2017-07-24 PROCEDURE — 93623 PRGRMD STIMJ&PACG IV RX NFS: CPT | Performed by: SPECIALIST

## 2017-07-24 PROCEDURE — 2500000003 HC RX 250 WO HCPCS: Performed by: INTERNAL MEDICINE

## 2017-07-24 PROCEDURE — 6360000002 HC RX W HCPCS

## 2017-07-24 PROCEDURE — 80048 BASIC METABOLIC PNL TOTAL CA: CPT

## 2017-07-24 PROCEDURE — 2580000003 HC RX 258

## 2017-07-24 PROCEDURE — 2500000003 HC RX 250 WO HCPCS

## 2017-07-24 PROCEDURE — 2000000000 HC ICU R&B

## 2017-07-24 PROCEDURE — 02K83ZZ MAP CONDUCTION MECHANISM, PERCUTANEOUS APPROACH: ICD-10-PCS | Performed by: SPECIALIST

## 2017-07-24 PROCEDURE — C1894 INTRO/SHEATH, NON-LASER: HCPCS

## 2017-07-24 PROCEDURE — 6370000000 HC RX 637 (ALT 250 FOR IP): Performed by: PSYCHIATRY & NEUROLOGY

## 2017-07-24 PROCEDURE — 85610 PROTHROMBIN TIME: CPT

## 2017-07-24 RX ORDER — ALPRAZOLAM 0.5 MG/1
0.5 TABLET ORAL
Status: COMPLETED | OUTPATIENT
Start: 2017-07-24 | End: 2017-07-24

## 2017-07-24 RX ORDER — METOPROLOL TARTRATE 5 MG/5ML
5 INJECTION INTRAVENOUS ONCE
Status: COMPLETED | OUTPATIENT
Start: 2017-07-24 | End: 2017-07-24

## 2017-07-24 RX ORDER — SODIUM CHLORIDE 0.9 % (FLUSH) 0.9 %
10 SYRINGE (ML) INJECTION PRN
Status: DISCONTINUED | OUTPATIENT
Start: 2017-07-24 | End: 2017-07-26 | Stop reason: HOSPADM

## 2017-07-24 RX ORDER — SODIUM CHLORIDE 0.9 % (FLUSH) 0.9 %
10 SYRINGE (ML) INJECTION EVERY 12 HOURS SCHEDULED
Status: DISCONTINUED | OUTPATIENT
Start: 2017-07-24 | End: 2017-07-24 | Stop reason: SDUPTHER

## 2017-07-24 RX ORDER — ONDANSETRON 2 MG/ML
4 INJECTION INTRAMUSCULAR; INTRAVENOUS EVERY 6 HOURS PRN
Status: DISCONTINUED | OUTPATIENT
Start: 2017-07-24 | End: 2017-07-24 | Stop reason: SDUPTHER

## 2017-07-24 RX ORDER — SODIUM CHLORIDE 9 MG/ML
INJECTION, SOLUTION INTRAVENOUS CONTINUOUS
Status: DISCONTINUED | OUTPATIENT
Start: 2017-07-24 | End: 2017-07-26 | Stop reason: HOSPADM

## 2017-07-24 RX ADMIN — CITALOPRAM HYDROBROMIDE 20 MG: 20 TABLET ORAL at 13:29

## 2017-07-24 RX ADMIN — ENOXAPARIN SODIUM 80 MG: 80 INJECTION SUBCUTANEOUS at 22:20

## 2017-07-24 RX ADMIN — ALPRAZOLAM 0.5 MG: 0.5 TABLET ORAL at 13:29

## 2017-07-24 RX ADMIN — MECLIZINE HYDROCHLORIDE 25 MG: 25 TABLET ORAL at 22:19

## 2017-07-24 RX ADMIN — GABAPENTIN 100 MG: 100 CAPSULE ORAL at 22:19

## 2017-07-24 RX ADMIN — LEVOTHYROXINE SODIUM 88 MCG: 88 TABLET ORAL at 13:29

## 2017-07-24 RX ADMIN — POTASSIUM CHLORIDE 20 MEQ: 20 TABLET, EXTENDED RELEASE ORAL at 13:31

## 2017-07-24 RX ADMIN — METOPROLOL TARTRATE 5 MG: 5 INJECTION INTRAVENOUS at 01:56

## 2017-07-24 RX ADMIN — HYDROCHLOROTHIAZIDE 25 MG: 25 TABLET ORAL at 13:29

## 2017-07-24 RX ADMIN — Medication 10 ML: at 22:20

## 2017-07-24 RX ADMIN — LOSARTAN POTASSIUM 50 MG: 50 TABLET, FILM COATED ORAL at 22:20

## 2017-07-24 RX ADMIN — LOSARTAN POTASSIUM 50 MG: 50 TABLET, FILM COATED ORAL at 13:29

## 2017-07-24 RX ADMIN — MECLIZINE HYDROCHLORIDE 25 MG: 25 TABLET ORAL at 13:29

## 2017-07-24 RX ADMIN — SODIUM CHLORIDE 75 ML/HR: 9 INJECTION, SOLUTION INTRAVENOUS at 06:24

## 2017-07-24 ASSESSMENT — PAIN SCALES - GENERAL
PAINLEVEL_OUTOF10: 0
PAINLEVEL_OUTOF10: 0

## 2017-07-25 LAB
ORGANISM: ABNORMAL
URINE CULTURE, ROUTINE: ABNORMAL

## 2017-07-25 PROCEDURE — 99232 SBSQ HOSP IP/OBS MODERATE 35: CPT | Performed by: PHYSICIAN ASSISTANT

## 2017-07-25 PROCEDURE — 2580000003 HC RX 258: Performed by: PHYSICIAN ASSISTANT

## 2017-07-25 PROCEDURE — 93005 ELECTROCARDIOGRAM TRACING: CPT

## 2017-07-25 PROCEDURE — 6370000000 HC RX 637 (ALT 250 FOR IP): Performed by: PSYCHIATRY & NEUROLOGY

## 2017-07-25 PROCEDURE — 6370000000 HC RX 637 (ALT 250 FOR IP): Performed by: PHYSICIAN ASSISTANT

## 2017-07-25 PROCEDURE — 6360000002 HC RX W HCPCS: Performed by: INTERNAL MEDICINE

## 2017-07-25 PROCEDURE — 6360000002 HC RX W HCPCS: Performed by: PHYSICIAN ASSISTANT

## 2017-07-25 PROCEDURE — 2700000000 HC OXYGEN THERAPY PER DAY

## 2017-07-25 PROCEDURE — 2060000000 HC ICU INTERMEDIATE R&B

## 2017-07-25 PROCEDURE — 6370000000 HC RX 637 (ALT 250 FOR IP): Performed by: SPECIALIST

## 2017-07-25 RX ORDER — NITROFURANTOIN 25; 75 MG/1; MG/1
100 CAPSULE ORAL EVERY 12 HOURS SCHEDULED
Status: DISCONTINUED | OUTPATIENT
Start: 2017-07-25 | End: 2017-07-26 | Stop reason: HOSPADM

## 2017-07-25 RX ORDER — POTASSIUM CHLORIDE 20 MEQ/1
20 TABLET, EXTENDED RELEASE ORAL
Qty: 60 TABLET | Refills: 3 | Status: SHIPPED | OUTPATIENT
Start: 2017-07-25 | End: 2017-11-17 | Stop reason: SDUPTHER

## 2017-07-25 RX ORDER — MECLIZINE HYDROCHLORIDE 25 MG/1
25 TABLET ORAL 3 TIMES DAILY PRN
Qty: 90 TABLET | Refills: 3 | Status: SHIPPED | OUTPATIENT
Start: 2017-07-25 | End: 2017-08-04

## 2017-07-25 RX ADMIN — MECLIZINE HYDROCHLORIDE 25 MG: 25 TABLET ORAL at 11:59

## 2017-07-25 RX ADMIN — HYDRALAZINE HYDROCHLORIDE 10 MG: 20 INJECTION INTRAMUSCULAR; INTRAVENOUS at 00:32

## 2017-07-25 RX ADMIN — CITALOPRAM HYDROBROMIDE 20 MG: 20 TABLET ORAL at 11:59

## 2017-07-25 RX ADMIN — MECLIZINE HYDROCHLORIDE 25 MG: 25 TABLET ORAL at 22:07

## 2017-07-25 RX ADMIN — LEVOTHYROXINE SODIUM 88 MCG: 88 TABLET ORAL at 11:58

## 2017-07-25 RX ADMIN — MECLIZINE HYDROCHLORIDE 25 MG: 25 TABLET ORAL at 16:55

## 2017-07-25 RX ADMIN — POTASSIUM CHLORIDE 20 MEQ: 20 TABLET, EXTENDED RELEASE ORAL at 11:58

## 2017-07-25 RX ADMIN — APIXABAN 5 MG: 5 TABLET, FILM COATED ORAL at 22:07

## 2017-07-25 RX ADMIN — ENOXAPARIN SODIUM 80 MG: 80 INJECTION SUBCUTANEOUS at 11:59

## 2017-07-25 RX ADMIN — Medication 10 ML: at 22:07

## 2017-07-25 RX ADMIN — Medication 10 ML: at 12:01

## 2017-07-25 RX ADMIN — ASPIRIN 81 MG CHEWABLE TABLET 81 MG: 81 TABLET CHEWABLE at 11:58

## 2017-07-25 RX ADMIN — GABAPENTIN 100 MG: 100 CAPSULE ORAL at 18:12

## 2017-07-25 RX ADMIN — NITROFURANTOIN MONOHYDRATE/MACROCRYSTALLINE 100 MG: 25; 75 CAPSULE ORAL at 22:07

## 2017-07-25 ASSESSMENT — ENCOUNTER SYMPTOMS
CHEST TIGHTNESS: 0
VOMITING: 0
WHEEZING: 0
COUGH: 0
APNEA: 0
COLOR CHANGE: 0
SHORTNESS OF BREATH: 0
NAUSEA: 0
ABDOMINAL PAIN: 0

## 2017-07-25 ASSESSMENT — PAIN SCALES - GENERAL
PAINLEVEL_OUTOF10: 0

## 2017-07-26 VITALS
DIASTOLIC BLOOD PRESSURE: 92 MMHG | BODY MASS INDEX: 34.71 KG/M2 | OXYGEN SATURATION: 93 % | HEART RATE: 82 BPM | SYSTOLIC BLOOD PRESSURE: 144 MMHG | HEIGHT: 60 IN | WEIGHT: 176.81 LBS | TEMPERATURE: 97.3 F | RESPIRATION RATE: 18 BRPM

## 2017-07-26 PROCEDURE — G8989 SELF CARE D/C STATUS: HCPCS

## 2017-07-26 PROCEDURE — 93005 ELECTROCARDIOGRAM TRACING: CPT

## 2017-07-26 PROCEDURE — 99238 HOSP IP/OBS DSCHRG MGMT 30/<: CPT | Performed by: PHYSICIAN ASSISTANT

## 2017-07-26 PROCEDURE — 6370000000 HC RX 637 (ALT 250 FOR IP): Performed by: PSYCHIATRY & NEUROLOGY

## 2017-07-26 PROCEDURE — G8980 MOBILITY D/C STATUS: HCPCS

## 2017-07-26 PROCEDURE — 6370000000 HC RX 637 (ALT 250 FOR IP): Performed by: PHYSICIAN ASSISTANT

## 2017-07-26 PROCEDURE — 2580000003 HC RX 258: Performed by: PHYSICIAN ASSISTANT

## 2017-07-26 PROCEDURE — G8978 MOBILITY CURRENT STATUS: HCPCS

## 2017-07-26 PROCEDURE — G8988 SELF CARE GOAL STATUS: HCPCS

## 2017-07-26 PROCEDURE — G8979 MOBILITY GOAL STATUS: HCPCS

## 2017-07-26 PROCEDURE — G8987 SELF CARE CURRENT STATUS: HCPCS

## 2017-07-26 PROCEDURE — 97162 PT EVAL MOD COMPLEX 30 MIN: CPT

## 2017-07-26 PROCEDURE — 6370000000 HC RX 637 (ALT 250 FOR IP): Performed by: SPECIALIST

## 2017-07-26 PROCEDURE — 97165 OT EVAL LOW COMPLEX 30 MIN: CPT

## 2017-07-26 RX ORDER — NITROFURANTOIN 25; 75 MG/1; MG/1
100 CAPSULE ORAL EVERY 12 HOURS SCHEDULED
Qty: 20 CAPSULE | Refills: 0 | Status: SHIPPED | OUTPATIENT
Start: 2017-07-26 | End: 2017-08-05

## 2017-07-26 RX ADMIN — NITROFURANTOIN MONOHYDRATE/MACROCRYSTALLINE 100 MG: 25; 75 CAPSULE ORAL at 08:38

## 2017-07-26 RX ADMIN — LOSARTAN POTASSIUM 50 MG: 50 TABLET, FILM COATED ORAL at 08:38

## 2017-07-26 RX ADMIN — Medication 10 ML: at 08:42

## 2017-07-26 RX ADMIN — ASPIRIN 81 MG CHEWABLE TABLET 81 MG: 81 TABLET CHEWABLE at 08:38

## 2017-07-26 RX ADMIN — APIXABAN 5 MG: 5 TABLET, FILM COATED ORAL at 08:38

## 2017-07-26 RX ADMIN — LEVOTHYROXINE SODIUM 88 MCG: 88 TABLET ORAL at 08:38

## 2017-07-26 RX ADMIN — HYDROCHLOROTHIAZIDE 25 MG: 25 TABLET ORAL at 08:38

## 2017-07-26 RX ADMIN — CITALOPRAM HYDROBROMIDE 20 MG: 20 TABLET ORAL at 08:38

## 2017-07-26 RX ADMIN — MECLIZINE HYDROCHLORIDE 25 MG: 25 TABLET ORAL at 08:38

## 2017-07-26 RX ADMIN — POTASSIUM CHLORIDE 20 MEQ: 20 TABLET, EXTENDED RELEASE ORAL at 08:38

## 2017-07-27 ENCOUNTER — CARE COORDINATION (OUTPATIENT)
Dept: CASE MANAGEMENT | Age: 82
End: 2017-07-27

## 2017-07-28 LAB
EKG ATRIAL RATE: 117 BPM
EKG ATRIAL RATE: 163 BPM
EKG ATRIAL RATE: 62 BPM
EKG ATRIAL RATE: 69 BPM
EKG ATRIAL RATE: 69 BPM
EKG ATRIAL RATE: 71 BPM
EKG ATRIAL RATE: 76 BPM
EKG ATRIAL RATE: 77 BPM
EKG ATRIAL RATE: 82 BPM
EKG ATRIAL RATE: 87 BPM
EKG P AXIS: 46 DEGREES
EKG P AXIS: 54 DEGREES
EKG P AXIS: 57 DEGREES
EKG P AXIS: 58 DEGREES
EKG P AXIS: 82 DEGREES
EKG P AXIS: 83 DEGREES
EKG P-R INTERVAL: 152 MS
EKG P-R INTERVAL: 168 MS
EKG P-R INTERVAL: 188 MS
EKG P-R INTERVAL: 192 MS
EKG P-R INTERVAL: 192 MS
EKG P-R INTERVAL: 202 MS
EKG P-R INTERVAL: 208 MS
EKG P-R INTERVAL: 222 MS
EKG Q-T INTERVAL: 320 MS
EKG Q-T INTERVAL: 382 MS
EKG Q-T INTERVAL: 424 MS
EKG Q-T INTERVAL: 436 MS
EKG Q-T INTERVAL: 442 MS
EKG Q-T INTERVAL: 460 MS
EKG Q-T INTERVAL: 468 MS
EKG Q-T INTERVAL: 482 MS
EKG Q-T INTERVAL: 496 MS
EKG Q-T INTERVAL: 506 MS
EKG QRS DURATION: 134 MS
EKG QRS DURATION: 142 MS
EKG QRS DURATION: 146 MS
EKG QRS DURATION: 148 MS
EKG QRS DURATION: 150 MS
EKG QRS DURATION: 152 MS
EKG QRS DURATION: 154 MS
EKG QTC CALCULATION (BAZETT): 360 MS
EKG QTC CALCULATION (BAZETT): 495 MS
EKG QTC CALCULATION (BAZETT): 499 MS
EKG QTC CALCULATION (BAZETT): 500 MS
EKG QTC CALCULATION (BAZETT): 501 MS
EKG QTC CALCULATION (BAZETT): 503 MS
EKG QTC CALCULATION (BAZETT): 523 MS
EKG QTC CALCULATION (BAZETT): 524 MS
EKG QTC CALCULATION (BAZETT): 542 MS
EKG QTC CALCULATION (BAZETT): 568 MS
EKG R AXIS: -52 DEGREES
EKG R AXIS: -60 DEGREES
EKG R AXIS: -62 DEGREES
EKG R AXIS: -65 DEGREES
EKG R AXIS: -65 DEGREES
EKG R AXIS: -66 DEGREES
EKG R AXIS: -70 DEGREES
EKG R AXIS: -73 DEGREES
EKG T AXIS: -18 DEGREES
EKG T AXIS: -26 DEGREES
EKG T AXIS: -4 DEGREES
EKG T AXIS: -5 DEGREES
EKG T AXIS: -8 DEGREES
EKG T AXIS: -9 DEGREES
EKG T AXIS: 118 DEGREES
EKG T AXIS: 16 DEGREES
EKG T AXIS: 36 DEGREES
EKG T AXIS: 67 DEGREES
EKG VENTRICULAR RATE: 133 BPM
EKG VENTRICULAR RATE: 62 BPM
EKG VENTRICULAR RATE: 69 BPM
EKG VENTRICULAR RATE: 69 BPM
EKG VENTRICULAR RATE: 71 BPM
EKG VENTRICULAR RATE: 71 BPM
EKG VENTRICULAR RATE: 76 BPM
EKG VENTRICULAR RATE: 77 BPM
EKG VENTRICULAR RATE: 82 BPM
EKG VENTRICULAR RATE: 87 BPM

## 2017-08-11 ENCOUNTER — OFFICE VISIT (OUTPATIENT)
Dept: CARDIOLOGY | Age: 82
End: 2017-08-11

## 2017-08-11 VITALS
HEART RATE: 74 BPM | DIASTOLIC BLOOD PRESSURE: 80 MMHG | SYSTOLIC BLOOD PRESSURE: 150 MMHG | RESPIRATION RATE: 14 BRPM | OXYGEN SATURATION: 96 %

## 2017-08-11 DIAGNOSIS — I10 ESSENTIAL HYPERTENSION: Primary | ICD-10-CM

## 2017-08-11 DIAGNOSIS — I71.21 ASCENDING AORTIC ANEURYSM: ICD-10-CM

## 2017-08-11 DIAGNOSIS — R07.89 ATYPICAL CHEST PAIN: ICD-10-CM

## 2017-08-11 DIAGNOSIS — R06.02 SOB (SHORTNESS OF BREATH): ICD-10-CM

## 2017-08-11 PROBLEM — R77.8 ELEVATED TROPONIN: Status: RESOLVED | Noted: 2017-07-19 | Resolved: 2017-08-11

## 2017-08-11 PROCEDURE — 4040F PNEUMOC VAC/ADMIN/RCVD: CPT | Performed by: INTERNAL MEDICINE

## 2017-08-11 PROCEDURE — G8399 PT W/DXA RESULTS DOCUMENT: HCPCS | Performed by: INTERNAL MEDICINE

## 2017-08-11 PROCEDURE — 1111F DSCHRG MED/CURRENT MED MERGE: CPT | Performed by: INTERNAL MEDICINE

## 2017-08-11 PROCEDURE — 1123F ACP DISCUSS/DSCN MKR DOCD: CPT | Performed by: INTERNAL MEDICINE

## 2017-08-11 PROCEDURE — 99213 OFFICE O/P EST LOW 20 MIN: CPT | Performed by: INTERNAL MEDICINE

## 2017-08-11 PROCEDURE — G8427 DOCREV CUR MEDS BY ELIG CLIN: HCPCS | Performed by: INTERNAL MEDICINE

## 2017-08-11 PROCEDURE — 1036F TOBACCO NON-USER: CPT | Performed by: INTERNAL MEDICINE

## 2017-08-11 PROCEDURE — G8417 CALC BMI ABV UP PARAM F/U: HCPCS | Performed by: INTERNAL MEDICINE

## 2017-08-11 PROCEDURE — 1090F PRES/ABSN URINE INCON ASSESS: CPT | Performed by: INTERNAL MEDICINE

## 2017-08-11 RX ORDER — OMEPRAZOLE 20 MG/1
20 CAPSULE, DELAYED RELEASE ORAL DAILY
COMMUNITY
End: 2020-10-02

## 2017-09-13 ENCOUNTER — OFFICE VISIT (OUTPATIENT)
Dept: PULMONOLOGY | Age: 82
End: 2017-09-13

## 2017-09-13 VITALS
TEMPERATURE: 98.1 F | HEART RATE: 69 BPM | RESPIRATION RATE: 18 BRPM | DIASTOLIC BLOOD PRESSURE: 86 MMHG | OXYGEN SATURATION: 92 % | SYSTOLIC BLOOD PRESSURE: 124 MMHG

## 2017-09-13 DIAGNOSIS — R06.02 SHORTNESS OF BREATH: Primary | ICD-10-CM

## 2017-09-13 DIAGNOSIS — G47.33 OBSTRUCTIVE SLEEP APNEA: ICD-10-CM

## 2017-09-13 PROCEDURE — 1036F TOBACCO NON-USER: CPT | Performed by: INTERNAL MEDICINE

## 2017-09-13 PROCEDURE — 99204 OFFICE O/P NEW MOD 45 MIN: CPT | Performed by: INTERNAL MEDICINE

## 2017-09-13 PROCEDURE — 1123F ACP DISCUSS/DSCN MKR DOCD: CPT | Performed by: INTERNAL MEDICINE

## 2017-09-13 PROCEDURE — G8417 CALC BMI ABV UP PARAM F/U: HCPCS | Performed by: INTERNAL MEDICINE

## 2017-09-13 PROCEDURE — G8427 DOCREV CUR MEDS BY ELIG CLIN: HCPCS | Performed by: INTERNAL MEDICINE

## 2017-09-13 PROCEDURE — 4040F PNEUMOC VAC/ADMIN/RCVD: CPT | Performed by: INTERNAL MEDICINE

## 2017-09-13 PROCEDURE — G8399 PT W/DXA RESULTS DOCUMENT: HCPCS | Performed by: INTERNAL MEDICINE

## 2017-09-13 PROCEDURE — 1090F PRES/ABSN URINE INCON ASSESS: CPT | Performed by: INTERNAL MEDICINE

## 2017-09-13 RX ORDER — HYDRALAZINE HYDROCHLORIDE 10 MG/1
TABLET, FILM COATED ORAL
Refills: 5 | Status: ON HOLD | COMMUNITY
Start: 2017-08-22 | End: 2021-03-01 | Stop reason: HOSPADM

## 2017-09-13 RX ORDER — MECLIZINE HCL 12.5 MG/1
12.5 TABLET ORAL 3 TIMES DAILY PRN
COMMUNITY
End: 2018-02-16 | Stop reason: ALTCHOICE

## 2017-09-13 ASSESSMENT — ENCOUNTER SYMPTOMS
DIARRHEA: 0
WHEEZING: 1
RHINORRHEA: 0
SHORTNESS OF BREATH: 1
ABDOMINAL PAIN: 0
CHEST TIGHTNESS: 0
SORE THROAT: 0
COUGH: 0
VOMITING: 0
NAUSEA: 0
SINUS PRESSURE: 0

## 2017-09-21 ENCOUNTER — HOSPITAL ENCOUNTER (OUTPATIENT)
Dept: PULMONOLOGY | Age: 82
Discharge: HOME | End: 2017-09-21
Payer: MEDICARE

## 2017-09-21 DIAGNOSIS — R06.02 SHORTNESS OF BREATH: ICD-10-CM

## 2017-09-21 PROCEDURE — 94729 DIFFUSING CAPACITY: CPT

## 2017-09-21 PROCEDURE — 6360000002 HC RX W HCPCS: Performed by: INTERNAL MEDICINE

## 2017-09-21 PROCEDURE — 94060 EVALUATION OF WHEEZING: CPT

## 2017-09-21 PROCEDURE — 94726 PLETHYSMOGRAPHY LUNG VOLUMES: CPT

## 2017-09-21 RX ORDER — ALBUTEROL SULFATE 2.5 MG/3ML
2.5 SOLUTION RESPIRATORY (INHALATION) ONCE
Status: COMPLETED | OUTPATIENT
Start: 2017-09-21 | End: 2017-09-21

## 2017-09-21 RX ADMIN — ALBUTEROL SULFATE 2.5 MG: 2.5 SOLUTION RESPIRATORY (INHALATION) at 10:15

## 2017-10-03 PROCEDURE — 94726 PLETHYSMOGRAPHY LUNG VOLUMES: CPT | Performed by: INTERNAL MEDICINE

## 2017-10-03 PROCEDURE — 94729 DIFFUSING CAPACITY: CPT | Performed by: INTERNAL MEDICINE

## 2017-10-03 PROCEDURE — 94060 EVALUATION OF WHEEZING: CPT | Performed by: INTERNAL MEDICINE

## 2017-11-17 RX ORDER — POTASSIUM CHLORIDE 20 MEQ/1
20 TABLET, EXTENDED RELEASE ORAL
Qty: 90 TABLET | Refills: 3 | Status: SHIPPED | OUTPATIENT
Start: 2017-11-17 | End: 2018-10-29 | Stop reason: SDUPTHER

## 2017-12-06 RX ORDER — DILTIAZEM HYDROCHLORIDE 120 MG/1
CAPSULE, EXTENDED RELEASE ORAL
Qty: 90 CAPSULE | Refills: 3 | Status: SHIPPED | OUTPATIENT
Start: 2017-12-06 | End: 2018-08-24 | Stop reason: ALTCHOICE

## 2018-02-16 ENCOUNTER — OFFICE VISIT (OUTPATIENT)
Dept: CARDIOLOGY CLINIC | Age: 83
End: 2018-02-16
Payer: MEDICARE

## 2018-02-16 VITALS
TEMPERATURE: 97.9 F | BODY MASS INDEX: 33.97 KG/M2 | SYSTOLIC BLOOD PRESSURE: 136 MMHG | WEIGHT: 184.6 LBS | HEART RATE: 71 BPM | OXYGEN SATURATION: 96 % | HEIGHT: 62 IN | RESPIRATION RATE: 18 BRPM | DIASTOLIC BLOOD PRESSURE: 86 MMHG

## 2018-02-16 DIAGNOSIS — R06.02 SHORTNESS OF BREATH: ICD-10-CM

## 2018-02-16 DIAGNOSIS — I10 ESSENTIAL HYPERTENSION: Primary | ICD-10-CM

## 2018-02-16 DIAGNOSIS — I71.21 ASCENDING AORTIC ANEURYSM: ICD-10-CM

## 2018-02-16 PROCEDURE — G8427 DOCREV CUR MEDS BY ELIG CLIN: HCPCS | Performed by: INTERNAL MEDICINE

## 2018-02-16 PROCEDURE — G8417 CALC BMI ABV UP PARAM F/U: HCPCS | Performed by: INTERNAL MEDICINE

## 2018-02-16 PROCEDURE — 1090F PRES/ABSN URINE INCON ASSESS: CPT | Performed by: INTERNAL MEDICINE

## 2018-02-16 PROCEDURE — G8484 FLU IMMUNIZE NO ADMIN: HCPCS | Performed by: INTERNAL MEDICINE

## 2018-02-16 PROCEDURE — 93000 ELECTROCARDIOGRAM COMPLETE: CPT | Performed by: INTERNAL MEDICINE

## 2018-02-16 PROCEDURE — 1123F ACP DISCUSS/DSCN MKR DOCD: CPT | Performed by: INTERNAL MEDICINE

## 2018-02-16 PROCEDURE — G8399 PT W/DXA RESULTS DOCUMENT: HCPCS | Performed by: INTERNAL MEDICINE

## 2018-02-16 PROCEDURE — 1036F TOBACCO NON-USER: CPT | Performed by: INTERNAL MEDICINE

## 2018-02-16 PROCEDURE — 4040F PNEUMOC VAC/ADMIN/RCVD: CPT | Performed by: INTERNAL MEDICINE

## 2018-02-16 PROCEDURE — 99213 OFFICE O/P EST LOW 20 MIN: CPT | Performed by: INTERNAL MEDICINE

## 2018-04-02 ENCOUNTER — TELEPHONE (OUTPATIENT)
Dept: CARDIOLOGY CLINIC | Age: 83
End: 2018-04-02

## 2018-08-24 ENCOUNTER — OFFICE VISIT (OUTPATIENT)
Dept: CARDIOLOGY CLINIC | Age: 83
End: 2018-08-24
Payer: MEDICARE

## 2018-08-24 VITALS
SYSTOLIC BLOOD PRESSURE: 126 MMHG | RESPIRATION RATE: 20 BRPM | WEIGHT: 180.1 LBS | DIASTOLIC BLOOD PRESSURE: 82 MMHG | TEMPERATURE: 97.1 F | HEIGHT: 62 IN | HEART RATE: 73 BPM | OXYGEN SATURATION: 95 % | BODY MASS INDEX: 33.14 KG/M2

## 2018-08-24 DIAGNOSIS — I71.21 ASCENDING AORTIC ANEURYSM: ICD-10-CM

## 2018-08-24 DIAGNOSIS — I10 ESSENTIAL HYPERTENSION: Primary | ICD-10-CM

## 2018-08-24 DIAGNOSIS — I71.9 DESCENDING AORTIC ANEURYSM (HCC): ICD-10-CM

## 2018-08-24 PROCEDURE — 99214 OFFICE O/P EST MOD 30 MIN: CPT | Performed by: INTERNAL MEDICINE

## 2018-08-24 PROCEDURE — 1090F PRES/ABSN URINE INCON ASSESS: CPT | Performed by: INTERNAL MEDICINE

## 2018-08-24 PROCEDURE — 4040F PNEUMOC VAC/ADMIN/RCVD: CPT | Performed by: INTERNAL MEDICINE

## 2018-08-24 PROCEDURE — 1036F TOBACCO NON-USER: CPT | Performed by: INTERNAL MEDICINE

## 2018-08-24 PROCEDURE — 1101F PT FALLS ASSESS-DOCD LE1/YR: CPT | Performed by: INTERNAL MEDICINE

## 2018-08-24 PROCEDURE — 1123F ACP DISCUSS/DSCN MKR DOCD: CPT | Performed by: INTERNAL MEDICINE

## 2018-08-24 PROCEDURE — G8427 DOCREV CUR MEDS BY ELIG CLIN: HCPCS | Performed by: INTERNAL MEDICINE

## 2018-08-24 PROCEDURE — G8417 CALC BMI ABV UP PARAM F/U: HCPCS | Performed by: INTERNAL MEDICINE

## 2018-08-24 RX ORDER — DILTIAZEM HYDROCHLORIDE 120 MG/1
240 TABLET, FILM COATED ORAL 2 TIMES DAILY
COMMUNITY
Start: 2018-07-09 | End: 2020-12-08 | Stop reason: CLARIF

## 2018-08-24 NOTE — PROGRESS NOTES
symptoms. No falls. 6-23-17: states she has been tired and fatigues. One time her BP was low in 70-80's. Improved with rest and eating. Was having nose bleeds. Her losartan was increased to BID. No pleural effusions. No pneumothoraces. There is no significant periaortic, pretracheal or perihilar adenopathy. Again note is made of aneurysmal dilatation of the ascending arch of the aorta. It measures 4.3 cm, unchanged. Again, note is made    aneurysmal dilatation of descending thoracic aorta. It measures approximately 3.9 cm, unchanged.       Within the field-of-view of the abdomen, note is made of a small to moderate hiatal hernia. 8-12-17: s/p hospitalization for CP and SOB. S/p normal LHC. She did have an SVT episode s/p ablation with dr. Joey Mendez. Her home BP readings are ok. Son states her HR is in the 40's at times. Her cardizem was lowered to one pill a day. Pt denies chest pain, , change in exercise capacity, fatigue,  nausea, vomiting, diarrhea, constipation, motor weakness, insomnia, weight loss, syncope, dizziness, lightheadedness, palpitations, PND, orthopnea. She was placed on [address] [place] [address] Road due to likely Afibb or hx of DVT/PE 5-7 yrs ago per family. S/p CTA of chest with no PE and stable aortic aneurysm measuing 4.3cm. Continues to have SOB. S/p ECHO    Summary   Severe annular calcification.   Mild to moderate (2+) mitral regurgitation is present.   Normal aortic valve structure and function.   Normal tricuspid valve structure and function.   Normal pulmonic valve structure and function.   Normal left atrium.   Left ventricular ejection fraction is visually estimated at 50%.  Impaired relaxation compatible with diastolic dysfunction. ( reversed E/A   ratio)   Mild concentric left ventricular hypertrophy.   Normal right atrium.   dilated RV chamber. , RVSP of 24mHg.    Normal right ventricle systolic pressure.   No evidence of pericardial effusion.   No evidence of pleural effusion.       2-16-18: Pt denies chest pain, dyspnea, dyspnea on exertion, change in exercise capacity, fatigue,  nausea, vomiting, diarrhea, constipation, motor weakness, insomnia, weight loss, syncope, dizziness, lightheadedness, palpitations, PND, orthopnea, or claudication. No nitro use. BP and hr are good. CAD is stable. No LE discoloration or ulcers. No LE edema. No CHF type symptoms. Lipid profile is normal.    states her Bp is labile. No further SVT episodes. On Eliquis and no bleeding issues. Did have follow up with Pulm and refused HUNTER testing. Airway resistance and airway conductance were both normal.     OVERALL IMPRESSION:  This study shows no significant obstructive  pattern but there was improvement in airflow after bronchodilator  therapy suggesting mild reactive airway disease. There was no  restrictive or diffusion impairment noted on this study. 8-24-18: having labile BP readings a times. Pt denies chest pain, dyspnea, dyspnea on exertion, change in exercise capacity, fatigue,  nausea, vomiting, diarrhea, constipation, motor weakness, insomnia, weight loss, syncope, dizziness, lightheadedness, palpitations, PND, orthopnea, or claudication. No nitro use. BP and hr are good. CAD is stable. No LE discoloration or ulcers. No LE edema. No CHF type symptoms. Lipid profile is normal. No recent hospitalization. No change in meds. Known MR of 2+. On NOAC, no bleeding issues.        Patient Active Problem List   Diagnosis    Essential hypertension    Lumbar stenosis with neurogenic claudication    Acquired scoliosis    Osteoporosis    Charcot Arleen Tooth muscular atrophy    Excess weight    Osteoarthritis of lumbar spine with myelopathy    Osteoarthritis    Myelopathy ([facility] [address]    Impaired mobility and activities of daily living due to [employer], severe lumbar canal stenosis s/p Rt and Rui L3-4, L4-5 microdissection and decompression, [facility] admit 6/23/16    Headache    Depression    Ascending aortic aneurysm [facility])    Descending aortic aneurysm ([address]    Atypical chest pain    Dizziness    Shortness of breath    Essential hypertension       Past Surgical History:   Procedure Laterality Date    JOINT REPLACEMENT         Social History     Social History    Marital status:      Spouse name: N/A    Number of children: N/A    Years of education: N/A     Social History Main Topics    Smoking status: Never Smoker    Smokeless tobacco: Never Used    Alcohol use No    Drug use: No    Sexual activity: Not Asked     Other Topics Concern    None     Social History Narrative    None       Family History   Problem Relation Age of Onset    Arthritis Mother     Arthritis Father     High Blood Pressure Father        Current Outpatient Prescriptions   Medication Sig Dispense Refill    diltiazem (CARDIZEM) 120 MG tablet Take 240 mg by mouth      potassium chloride (KLOR-CON M) 20 MEQ extended release tablet Take 1 tablet by mouth daily (with breakfast) 90 tablet 3    apixaban (ELIQUIS) 5 MG TABS tablet Take 1 tablet by mouth 2 times daily 180 tablet 3    hydrALAZINE (APRESOLINE) 10 MG tablet TAKE 1 TABLET IN THE EVENING. repeat dose IN THE MORNING if systolic >592  5    omeprazole (PRILOSEC) 20 MG delayed release capsule Take 20 mg by mouth daily      vitamin D (CHOLECALCIFEROL) 1000 UNIT TABS tablet Take 1,000 Units by mouth daily      aspirin 81 MG tablet Take 81 mg by mouth daily       losartan (COZAAR) 50 MG tablet 50 mg daily       citalopram (CELEXA) 20 MG tablet Take 20 mg by mouth daily       SYNTHROID 88 MCG tablet Take 88 mcg by mouth daily        No current facility-administered medications for this visit. Latex and Tape [adhesive tape]    Review of Systems:  General ROS: negative  Psychological ROS: negative  Hematological and Lymphatic ROS: No history of blood clots or bleeding disorder.    Respiratory ROS: no cough, shortness of breath, or wheezing  Cardiovascular ROS: no chest pain or dyspnea on exertion  Gastrointestinal ROS: no abdominal pain, change in bowel habits, or black or bloody stools  Genito-Urinary ROS: no dysuria, trouble voiding, or hematuria  Musculoskeletal ROS: negative  Neurological ROS: no TIA or stroke symptoms  Dermatological ROS: negative    VITALS:  Blood pressure 126/82, pulse 73, temperature 97.1 °F (36.2 °C), temperature source Temporal, resp. rate 20, height 5' 2\" (1.575 m), weight 180 lb 1.6 oz (81.7 kg), SpO2 95 %. Body mass index is 32.94 kg/m². Physical Examination:  General appearance - alert, well appearing, and in no distress and overweight  Mental status - alert, oriented to person, place, and time  Neck - Neck is supple, no JVD or carotid bruits. No thyromegaly or adenopathy. Chest - clear to auscultation, no wheezes, rales or rhonchi, symmetric air entry  Heart - normal rate, regular rhythm, normal S1, S2, no murmurs, rubs, clicks or gallops  Abdomen - soft, nontender, nondistended, no masses or organomegaly  Neurological - alert, oriented, normal speech, no focal findings or movement disorder noted  Extremities - peripheral pulses normal, 1-2+ pedal edema, no clubbing or cyanosis  Skin - normal coloration and turgor, no rashes, no suspicious skin lesions noted        No orders of the defined types were placed in this encounter. ASSESSMENT:     Diagnosis Orders   1. Essential hypertension     2. Descending aortic aneurysm ([facility] [address]     3. Ascending aortic aneurysm [facility])           PLAN:     Patient will need to continue to follow up with you for their general medical care     As always, aggressive risk factor modification is strongly recommended. We should adhere to the [address] guidelines for HTN management and the NCEP ATP III guidelines for LDL-C management. Cardiac diet is always recommended with low fat, cholesterol, calories and sodium. Continue medications at current doses. CT of chest for asc. Aorta aneurysm in 6 months.      Check EKG next office visit    ECHO in future for MR. Consider Diuretics for LE edema if worsens or stopping Cardizem. Patient was advised and encouraged to check blood pressure at home or at a pharmacy, maintain a logbook, and also call us back if blood pressure are above the target ranges or if it is low. Patient clearly understands and agrees to the instructions. We will need to continue to monitor muscle and liver enzymes, BUN, CR, and electrolytes. Details of medical condition explained and patient was warned about adverse consequences of uncontrolled medical conditions and possible side effects of prescribed medications.

## 2018-08-31 RX ORDER — APIXABAN 5 MG/1
TABLET, FILM COATED ORAL
Qty: 180 TABLET | Refills: 3 | Status: SHIPPED | OUTPATIENT
Start: 2018-08-31 | End: 2019-03-01

## 2018-11-30 RX ORDER — DILTIAZEM HYDROCHLORIDE 120 MG/1
CAPSULE, EXTENDED RELEASE ORAL
Qty: 90 CAPSULE | Refills: 3 | Status: SHIPPED | OUTPATIENT
Start: 2018-11-30 | End: 2019-03-01 | Stop reason: SDUPTHER

## 2019-03-01 ENCOUNTER — OFFICE VISIT (OUTPATIENT)
Dept: CARDIOLOGY CLINIC | Age: 84
End: 2019-03-01
Payer: MEDICARE

## 2019-03-01 VITALS
BODY MASS INDEX: 32.56 KG/M2 | RESPIRATION RATE: 16 BRPM | SYSTOLIC BLOOD PRESSURE: 114 MMHG | OXYGEN SATURATION: 97 % | WEIGHT: 178 LBS | HEART RATE: 70 BPM | DIASTOLIC BLOOD PRESSURE: 80 MMHG

## 2019-03-01 DIAGNOSIS — R06.02 SHORTNESS OF BREATH: ICD-10-CM

## 2019-03-01 DIAGNOSIS — I71.21 ASCENDING AORTIC ANEURYSM: ICD-10-CM

## 2019-03-01 DIAGNOSIS — I10 ESSENTIAL HYPERTENSION: Primary | ICD-10-CM

## 2019-03-01 PROCEDURE — G8427 DOCREV CUR MEDS BY ELIG CLIN: HCPCS | Performed by: INTERNAL MEDICINE

## 2019-03-01 PROCEDURE — 1036F TOBACCO NON-USER: CPT | Performed by: INTERNAL MEDICINE

## 2019-03-01 PROCEDURE — G8484 FLU IMMUNIZE NO ADMIN: HCPCS | Performed by: INTERNAL MEDICINE

## 2019-03-01 PROCEDURE — G8417 CALC BMI ABV UP PARAM F/U: HCPCS | Performed by: INTERNAL MEDICINE

## 2019-03-01 PROCEDURE — 1123F ACP DISCUSS/DSCN MKR DOCD: CPT | Performed by: INTERNAL MEDICINE

## 2019-03-01 PROCEDURE — 1101F PT FALLS ASSESS-DOCD LE1/YR: CPT | Performed by: INTERNAL MEDICINE

## 2019-03-01 PROCEDURE — 4040F PNEUMOC VAC/ADMIN/RCVD: CPT | Performed by: INTERNAL MEDICINE

## 2019-03-01 PROCEDURE — 1090F PRES/ABSN URINE INCON ASSESS: CPT | Performed by: INTERNAL MEDICINE

## 2019-03-01 PROCEDURE — 99214 OFFICE O/P EST MOD 30 MIN: CPT | Performed by: INTERNAL MEDICINE

## 2019-03-01 PROCEDURE — 93000 ELECTROCARDIOGRAM COMPLETE: CPT | Performed by: INTERNAL MEDICINE

## 2019-03-15 ENCOUNTER — HOSPITAL ENCOUNTER (OUTPATIENT)
Dept: CT IMAGING | Age: 84
Discharge: HOME | End: 2019-03-17
Payer: MEDICARE

## 2019-03-15 DIAGNOSIS — I71.21 ASCENDING AORTIC ANEURYSM: ICD-10-CM

## 2019-03-15 PROCEDURE — 6360000004 HC RX CONTRAST MEDICATION: Performed by: INTERNAL MEDICINE

## 2019-03-15 PROCEDURE — 71275 CT ANGIOGRAPHY CHEST: CPT

## 2019-03-15 RX ADMIN — IOPAMIDOL 100 ML: 612 INJECTION, SOLUTION INTRAVENOUS at 09:13

## 2019-05-14 RX ORDER — DILTIAZEM HYDROCHLORIDE 120 MG/1
CAPSULE, COATED, EXTENDED RELEASE ORAL
Qty: 90 CAPSULE | Refills: 3 | Status: SHIPPED | OUTPATIENT
Start: 2019-05-14 | End: 2019-10-31 | Stop reason: SDUPTHER

## 2019-10-31 DIAGNOSIS — I10 ESSENTIAL HYPERTENSION: Primary | ICD-10-CM

## 2019-11-06 RX ORDER — DILTIAZEM HYDROCHLORIDE 120 MG/1
CAPSULE, EXTENDED RELEASE ORAL
Qty: 180 CAPSULE | Refills: 1 | Status: SHIPPED | OUTPATIENT
Start: 2019-11-06 | End: 2020-04-16

## 2020-03-25 PROBLEM — I10 ESSENTIAL HYPERTENSION: Status: RESOLVED | Noted: 2020-03-25 | Resolved: 2020-03-24

## 2020-04-16 RX ORDER — DILTIAZEM HYDROCHLORIDE 120 MG/1
CAPSULE, COATED, EXTENDED RELEASE ORAL
Qty: 180 CAPSULE | Refills: 0 | Status: SHIPPED | OUTPATIENT
Start: 2020-04-16 | End: 2020-07-15

## 2020-04-24 RX ORDER — POTASSIUM CHLORIDE 20 MEQ/1
TABLET, EXTENDED RELEASE ORAL
Qty: 90 TABLET | Refills: 0 | Status: SHIPPED | OUTPATIENT
Start: 2020-04-24 | End: 2020-07-22

## 2020-07-15 RX ORDER — DILTIAZEM HYDROCHLORIDE 120 MG/1
CAPSULE, COATED, EXTENDED RELEASE ORAL
Qty: 180 CAPSULE | Refills: 3 | Status: SHIPPED | OUTPATIENT
Start: 2020-07-15 | End: 2020-10-02 | Stop reason: SDUPTHER

## 2020-07-15 NOTE — TELEPHONE ENCOUNTER
requesting medication refill.  Please approve or deny this request.    Rx requested:  Requested Prescriptions     Pending Prescriptions Disp Refills    dilTIAZem (CARDIZEM CD) 120 MG extended release capsule [Pharmacy Med Name: DILTIAZEM HCL ER(CD) CAPS 120MG] 180 capsule 3     Sig: TAKE 2 CAPSULES DAILY (CALL FOR APPOINTMENT FOR FURTHER REFILLS)         Last Office Visit:   3/1/2019      Next Visit Date:  Future Appointments   Date Time Provider Felecia Corrales   9/4/2020 10:00 AM Jorge Franco,  [address] [place] [address]

## 2020-07-22 RX ORDER — POTASSIUM CHLORIDE 1500 MG/1
TABLET, EXTENDED RELEASE ORAL
Qty: 90 TABLET | Refills: 3 | Status: SHIPPED | OUTPATIENT
Start: 2020-07-22 | End: 2021-07-19

## 2020-07-22 NOTE — TELEPHONE ENCOUNTER
requesting medication refill.  Please approve or deny this request.    Rx requested:  Requested Prescriptions     Pending Prescriptions Disp Refills    KLOR-CON M20 20 MEQ extended release tablet [Pharmacy Med Name: POTASSIUM CHLORIDE ER (DISP) TABS 20MEQ] 90 tablet 3     Sig: TAKE 1 TABLET DAILY WITH BREAKFAST (MUST CALL OFFICE FOR FOLLOW UP)         Last Office Visit:   3/1/2019      Next Visit Date:  Future Appointments   Date Time Provider Felecia Corrales   9/4/2020 10:00 AM Jorge Franco DO [address] [place] [address]

## 2020-10-02 ENCOUNTER — OFFICE VISIT (OUTPATIENT)
Dept: CARDIOLOGY CLINIC | Age: 85
End: 2020-10-02
Payer: MEDICARE

## 2020-10-02 VITALS
WEIGHT: 171 LBS | BODY MASS INDEX: 31.28 KG/M2 | DIASTOLIC BLOOD PRESSURE: 70 MMHG | HEART RATE: 67 BPM | SYSTOLIC BLOOD PRESSURE: 116 MMHG

## 2020-10-02 PROCEDURE — 93000 ELECTROCARDIOGRAM COMPLETE: CPT | Performed by: INTERNAL MEDICINE

## 2020-10-02 PROCEDURE — 4040F PNEUMOC VAC/ADMIN/RCVD: CPT | Performed by: INTERNAL MEDICINE

## 2020-10-02 PROCEDURE — 1123F ACP DISCUSS/DSCN MKR DOCD: CPT | Performed by: INTERNAL MEDICINE

## 2020-10-02 PROCEDURE — G8417 CALC BMI ABV UP PARAM F/U: HCPCS | Performed by: INTERNAL MEDICINE

## 2020-10-02 PROCEDURE — 1036F TOBACCO NON-USER: CPT | Performed by: INTERNAL MEDICINE

## 2020-10-02 PROCEDURE — 1090F PRES/ABSN URINE INCON ASSESS: CPT | Performed by: INTERNAL MEDICINE

## 2020-10-02 PROCEDURE — G8484 FLU IMMUNIZE NO ADMIN: HCPCS | Performed by: INTERNAL MEDICINE

## 2020-10-02 PROCEDURE — 99214 OFFICE O/P EST MOD 30 MIN: CPT | Performed by: INTERNAL MEDICINE

## 2020-10-02 PROCEDURE — G8427 DOCREV CUR MEDS BY ELIG CLIN: HCPCS | Performed by: INTERNAL MEDICINE

## 2020-10-02 RX ORDER — GLUCOSAMINE/D3/BOSWELLIA SERRA 1500MG-400
1 TABLET ORAL DAILY
COMMUNITY

## 2020-10-02 RX ORDER — MULTIVIT,IRON,MINERALS/LUTEIN
1 TABLET ORAL DAILY
COMMUNITY

## 2020-10-02 RX ORDER — LANOLIN ALCOHOL/MO/W.PET/CERES
1000 CREAM (GRAM) TOPICAL DAILY
COMMUNITY

## 2020-10-02 NOTE — PROGRESS NOTES
Chief Complaint   Patient presents with    Hypertension    1 Year Follow Up       5-12-16: Patient presents for initial medical evaluation. Patient is followed on a regular basis by Dr. Allison Garner MD. S/p hospitalization for hip pain. Was seen by us for bradycardia and RBBB. She was asymptomatic at that time. Her cardizem was lowered to 240mg daily. HR is in 70's today. Pt denies chest pain, dyspnea, dyspnea on exertion, change in exercise capacity, fatigue,  nausea, vomiting, diarrhea, constipation, motor weakness, insomnia, weight loss, syncope, dizziness, lightheadedness, palpitations, PND, orthopnea, or claudication                   no hx of MI, CHF or arrhythmia. No hx of LHC. No recent stress test. Does have back and right hip pain. 6-9-16: needs to have back surgery with DR. Malena Maldonado. S/p ECHO with EF of 60%, mild LVH, grade I DD, mild TR, + IAS aneurysm with ? Left to right PFO, aorta aneurysm noted meauring  4.2-4.5cm. Pt denies chest pain, dyspnea, dyspnea on exertion, change in exercise capacity, fatigue,  nausea, vomiting, diarrhea, constipation, motor weakness, insomnia, weight loss, syncope, dizziness, lightheadedness, palpitations, PND, orthopnea. BP and HR are good. 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 No dissection. Descending aorta measures 3.6cm. Pt denies chest pain, dyspnea, dyspnea on exertion, change in exercise capacity, fatigue,  nausea, vomiting, diarrhea, constipation, motor weakness, insomnia, weight loss, syncope, dizziness, lightheadedness, palpitations, PND, orthopnea, or claudication. Back surgery is tomorrow. 9-27-16: as above, doing well overall. Pt denies chest pain, dyspnea, dyspnea on exertion, change in exercise capacity, fatigue,  nausea, vomiting, diarrhea, constipation, motor weakness, insomnia, weight loss, syncope, dizziness, lightheadedness, palpitations, PND, orthopnea, or claudication. No hospitalizations.  No CHF type symptoms. No falls. 6-23-17: states she has been tired and fatigues. One time her BP was low in 70-80's. Improved with rest and eating. Was having nose bleeds. Her losartan was increased to BID. No pleural effusions. No pneumothoraces. There is no significant periaortic, pretracheal or perihilar adenopathy. Again note is made of aneurysmal dilatation of the ascending arch of the aorta. It measures 4.3 cm, unchanged. Again, note is made    aneurysmal dilatation of descending thoracic aorta. It measures approximately 3.9 cm, unchanged.       Within the field-of-view of the abdomen, note is made of a small to moderate hiatal hernia. 8-12-17: s/p hospitalization for CP and SOB. S/p normal LHC. She did have an SVT episode s/p ablation with dr. Cat Syed. Her home BP readings are ok. Son states her HR is in the 40's at times. Her cardizem was lowered to one pill a day. Pt denies chest pain, , change in exercise capacity, fatigue,  nausea, vomiting, diarrhea, constipation, motor weakness, insomnia, weight loss, syncope, dizziness, lightheadedness, palpitations, PND, orthopnea. She was placed on [address] [place] [address] due to likely Afibb or hx of DVT/PE 5-7 yrs ago per family. S/p CTA of chest with no PE and stable aortic aneurysm measuing 4.3cm. Continues to have SOB. S/p ECHO    Summary   Severe annular calcification.   Mild to moderate (2+) mitral regurgitation is present.   Normal aortic valve structure and function.   Normal tricuspid valve structure and function.   Normal pulmonic valve structure and function.   Normal left atrium.   Left ventricular ejection fraction is visually estimated at 50%.  Impaired relaxation compatible with diastolic dysfunction. ( reversed E/A   ratio)   Mild concentric left ventricular hypertrophy.   Normal right atrium.   dilated RV chamber. , RVSP of 24mHg.    Normal right ventricle systolic pressure.   No evidence of pericardial effusion.   No evidence of pleural effusion.       2-16-18: Pt denies chest pain, dyspnea, dyspnea on exertion, change in exercise capacity, fatigue,  nausea, vomiting, diarrhea, constipation, motor weakness, insomnia, weight loss, syncope, dizziness, lightheadedness, palpitations, PND, orthopnea, or claudication. No nitro use. BP and hr are good. CAD is stable. No LE discoloration or ulcers. No LE edema. No CHF type symptoms. Lipid profile is normal.    states her Bp is labile. No further SVT episodes. On Eliquis and no bleeding issues. Did have follow up with Pulm and refused HUNTER testing. Airway resistance and airway conductance were both normal.     OVERALL IMPRESSION:  This study shows no significant obstructive  pattern but there was improvement in airflow after bronchodilator  therapy suggesting mild reactive airway disease. There was no  restrictive or diffusion impairment noted on this study. 8-24-18: having labile BP readings a times. Pt denies chest pain, dyspnea, dyspnea on exertion, change in exercise capacity, fatigue,  nausea, vomiting, diarrhea, constipation, motor weakness, insomnia, weight loss, syncope, dizziness, lightheadedness, palpitations, PND, orthopnea, or claudication. No nitro use. BP and hr are good. CAD is stable. No LE discoloration or ulcers. No LE edema. No CHF type symptoms. Lipid profile is normal. No recent hospitalization. No change in meds. Known MR of 2+. On NOAC, no bleeding issues. 2-29-19: on eliquis. Was having nose bleeds and was referred to ENT. Back on Eliquis now. On ASA. Pt denies chest pain, dyspnea, dyspnea on exertion, change in exercise capacity, fatigue,  nausea, vomiting, diarrhea, constipation, motor weakness, insomnia, weight loss, syncope, dizziness, lightheadedness, palpitations, PND, orthopnea, or claudication. No nitro use. BP and hr are good. CAD is stable. No LE discoloration or ulcers. No LE edema. No CHF type symptoms. Lipid profile is normal. No recent hospitalization. No change in meds.  EKG Occupational History    Not on file   Social Needs    Financial resource strain: Not on file    Food insecurity     Worry: Not on file     Inability: Not on file    Transportation needs     Medical: Not on file     Non-medical: Not on file   Tobacco Use    Smoking status: Never Smoker    Smokeless tobacco: Never Used   Substance and Sexual Activity    Alcohol use: No     Alcohol/week: 0.0 standard drinks    Drug use: No    Sexual activity: Not on file   Lifestyle    Physical activity     Days per week: Not on file     Minutes per session: Not on file    Stress: Not on file   Relationships    Social connections     Talks on phone: Not on file     Gets together: Not on file     Attends Gnosticism service: Not on file     Active member of club or organization: Not on file     Attends meetings of clubs or organizations: Not on file     Relationship status: Not on file    Intimate partner violence     Fear of current or ex partner: Not on file     Emotionally abused: Not on file     Physically abused: Not on file     Forced sexual activity: Not on file   Other Topics Concern    Not on file   Social History Narrative    Not on file       Family History   Problem Relation Age of Onset    Arthritis Mother     Arthritis Father     High Blood Pressure Father        Current Outpatient Medications   Medication Sig Dispense Refill    [place]-Glucosamine-Vit D (OSTEO BI-FLEX ONE PER DAY) TABS Take by mouth daily      Calcium Carbonate-Vitamin D (CALTRATE 600+D PO) Take by mouth daily      Multiple Vitamins-Minerals (CENTRUM SILVER ULTRA WOMENS) TABS Take by mouth daily      vitamin B-12 (CYANOCOBALAMIN) 1000 MCG tablet Take 1,000 mcg by mouth daily      KLOR-CON M20 20 MEQ extended release tablet TAKE 1 TABLET DAILY WITH BREAKFAST (MUST CALL OFFICE FOR FOLLOW UP) 90 tablet 3    diltiazem (CARDIZEM) 120 MG tablet Take 240 mg by mouth 2 times daily       hydrALAZINE (APRESOLINE) 10 MG tablet TAKE 1 TABLET IN THE EVENING. repeat dose IN THE MORNING if systolic >701  5    aspirin 81 MG tablet Take 81 mg by mouth daily       losartan (COZAAR) 50 MG tablet Take 50 mg by mouth 2 times daily       citalopram (CELEXA) 20 MG tablet Take 20 mg by mouth daily       SYNTHROID 88 MCG tablet Take 88 mcg by mouth daily        No current facility-administered medications for this visit. Latex and Tape [adhesive tape]    Review of Systems:  General ROS: negative  Psychological ROS: negative  Hematological and Lymphatic ROS: No history of blood clots or bleeding disorder. Respiratory ROS: no cough, shortness of breath, or wheezing  Cardiovascular ROS: no chest pain or dyspnea on exertion  Gastrointestinal ROS: no abdominal pain, change in bowel habits, or black or bloody stools  Genito-Urinary ROS: no dysuria, trouble voiding, or hematuria  Musculoskeletal ROS: negative  Neurological ROS: no TIA or stroke symptoms  Dermatological ROS: negative    VITALS:  Blood pressure 116/70, pulse 67, weight 171 lb (77.6 kg). Body mass index is 31.28 kg/m². Physical Examination:  General appearance - alert, well appearing, and in no distress and overweight  Mental status - alert, oriented to person, place, and time  Neck - Neck is supple, no JVD or carotid bruits. No thyromegaly or adenopathy. Chest - clear to auscultation, no wheezes, rales or rhonchi, symmetric air entry  Heart - normal rate, regular rhythm, normal S1, S2, no murmurs, rubs, clicks or gallops  Abdomen - soft, nontender, nondistended, no masses or organomegaly  Neurological - alert, oriented, normal speech, no focal findings or movement disorder noted  Extremities - peripheral pulses normal, 1-2+ pedal edema, no clubbing or cyanosis  Skin - normal coloration and turgor, no rashes, no suspicious skin lesions noted        Orders Placed This Encounter   Procedures    EKG 12 Lead       ASSESSMENT:     Diagnosis Orders   1.  Essential hypertension  EKG 12 Lead   2. Descending aortic aneurysm ([address]     3. History of a sending aortic aneurysm  . Shortness of breath questionable related to deconditioning  Hx of SVT  Normal coronaries. PLAN:     Patient will need to continue to follow up with you for their general medical care     As always, aggressive risk factor modification is strongly recommended. We should adhere to the [address] guidelines for HTN management and the NCEP ATP III guidelines for LDL-C management. Cardiac diet is always recommended with low fat, cholesterol, calories and sodium. Continue medications at current doses. OK TO STOP ASA and Eliquis due to hx of provoked DVT 8 yrs ago. No documented hx of afibb. Had SVT ablation. CT of chest for asc. Aorta aneurysm in 6 months. Check EKG     ECHO in future for MR. In 6 months. Consider Diuretics for LE edema if worsens or stopping Cardizem. Patient was advised and encouraged to check blood pressure at home or at a pharmacy, maintain a logbook, and also call us back if blood pressure are above the target ranges or if it is low. Patient clearly understands and agrees to the instructions. We will need to continue to monitor muscle and liver enzymes, BUN, CR, and electrolytes. Details of medical condition explained and patient was warned about adverse consequences of uncontrolled medical conditions and possible side effects of prescribed medications.

## 2020-12-08 RX ORDER — DILTIAZEM HYDROCHLORIDE 120 MG/1
120 TABLET, FILM COATED ORAL 2 TIMES DAILY
COMMUNITY
End: 2020-12-08 | Stop reason: SDUPTHER

## 2020-12-08 RX ORDER — DILTIAZEM HYDROCHLORIDE 120 MG/1
120 TABLET, FILM COATED ORAL 2 TIMES DAILY
Qty: 180 TABLET | Refills: 3 | Status: SHIPPED | OUTPATIENT
Start: 2020-12-08 | End: 2020-12-18 | Stop reason: DRUGHIGH

## 2020-12-08 RX ORDER — DILTIAZEM HYDROCHLORIDE 120 MG/1
120 TABLET, FILM COATED ORAL 2 TIMES DAILY
Qty: 28 TABLET | Refills: 0 | Status: SHIPPED | OUTPATIENT
Start: 2020-12-08 | End: 2020-12-18 | Stop reason: DRUGHIGH

## 2020-12-18 RX ORDER — DILTIAZEM HYDROCHLORIDE 120 MG/1
120 CAPSULE, COATED, EXTENDED RELEASE ORAL 2 TIMES DAILY
Qty: 180 CAPSULE | Refills: 3
Start: 2020-12-18 | End: 2020-12-23 | Stop reason: SDUPTHER

## 2020-12-23 RX ORDER — DILTIAZEM HYDROCHLORIDE 120 MG/1
120 CAPSULE, COATED, EXTENDED RELEASE ORAL 2 TIMES DAILY
Qty: 60 CAPSULE | Refills: 1 | Status: ON HOLD | OUTPATIENT
Start: 2020-12-23 | End: 2021-03-01 | Stop reason: HOSPADM

## 2020-12-23 NOTE — TELEPHONE ENCOUNTER
requesting medication refill.  Please approve or deny this request.    Rx requested:  Requested Prescriptions     Pending Prescriptions Disp Refills    dilTIAZem (CARDIZEM CD) 120 MG extended release capsule 60 capsule 1     Sig: Take 1 capsule by mouth 2 times daily         Last Office Visit:   10/2/2020      Next Visit Date:  Future Appointments   Date Time Provider Felecia Corrales   4/16/2021 10:00 AM Jorge Dominguez [facility]

## 2021-01-25 ENCOUNTER — HOSPITAL ENCOUNTER (INPATIENT)
Age: 86
LOS: 8 days | Discharge: NURSING, REHAB OR LONG-TERM CARE | DRG: 287 | End: 2021-02-02
Attending: EMERGENCY MEDICINE | Admitting: INTERNAL MEDICINE
Payer: MEDICARE

## 2021-01-25 ENCOUNTER — APPOINTMENT (OUTPATIENT)
Dept: GENERAL RADIOLOGY | Age: 86
DRG: 287 | End: 2021-01-25
Payer: MEDICARE

## 2021-01-25 ENCOUNTER — APPOINTMENT (OUTPATIENT)
Dept: CT IMAGING | Age: 86
DRG: 287 | End: 2021-01-25
Payer: MEDICARE

## 2021-01-25 DIAGNOSIS — I50.9 ACUTE CONGESTIVE HEART FAILURE, UNSPECIFIED HEART FAILURE TYPE (HCC): ICD-10-CM

## 2021-01-25 DIAGNOSIS — J90 PLEURAL EFFUSION: ICD-10-CM

## 2021-01-25 DIAGNOSIS — R06.00 DYSPNEA, UNSPECIFIED TYPE: Primary | ICD-10-CM

## 2021-01-25 PROBLEM — I50.43 ACUTE ON CHRONIC COMBINED SYSTOLIC AND DIASTOLIC CHF (CONGESTIVE HEART FAILURE) (HCC): Status: ACTIVE | Noted: 2021-01-25

## 2021-01-25 LAB
ALBUMIN SERPL-MCNC: 3.5 G/DL (ref 3.5–4.6)
ALP BLD-CCNC: 57 U/L (ref 40–130)
ALT SERPL-CCNC: 20 U/L (ref 0–33)
ANION GAP SERPL CALCULATED.3IONS-SCNC: 11 MEQ/L (ref 9–15)
APTT: 30.3 SEC (ref 24.4–36.8)
AST SERPL-CCNC: 23 U/L (ref 0–35)
BASOPHILS ABSOLUTE: 0 K/UL (ref 0–0.2)
BASOPHILS RELATIVE PERCENT: 0.4 %
BILIRUB SERPL-MCNC: 0.4 MG/DL (ref 0.2–0.7)
BUN BLDV-MCNC: 25 MG/DL (ref 8–23)
CALCIUM SERPL-MCNC: 8.8 MG/DL (ref 8.5–9.9)
CHLORIDE BLD-SCNC: 105 MEQ/L (ref 95–107)
CO2: 24 MEQ/L (ref 20–31)
CREAT SERPL-MCNC: 1.03 MG/DL (ref 0.5–0.9)
D DIMER: 5.15 MG/L FEU (ref 0–0.5)
EOSINOPHILS ABSOLUTE: 0.1 K/UL (ref 0–0.7)
EOSINOPHILS RELATIVE PERCENT: 1.1 %
GFR AFRICAN AMERICAN: >60
GFR NON-AFRICAN AMERICAN: 50.5
GLOBULIN: 3.8 G/DL (ref 2.3–3.5)
GLUCOSE BLD-MCNC: 117 MG/DL (ref 70–99)
HCT VFR BLD CALC: 36.1 % (ref 37–47)
HEMOGLOBIN: 11.6 G/DL (ref 12–16)
INR BLD: 1.2
LYMPHOCYTES ABSOLUTE: 1 K/UL (ref 1–4.8)
LYMPHOCYTES RELATIVE PERCENT: 9 %
MCH RBC QN AUTO: 28.5 PG (ref 27–31.3)
MCHC RBC AUTO-ENTMCNC: 32.1 % (ref 33–37)
MCV RBC AUTO: 88.6 FL (ref 82–100)
MONOCYTES ABSOLUTE: 1 K/UL (ref 0.2–0.8)
MONOCYTES RELATIVE PERCENT: 8.7 %
NEUTROPHILS ABSOLUTE: 8.9 K/UL (ref 1.4–6.5)
NEUTROPHILS RELATIVE PERCENT: 80.8 %
PDW BLD-RTO: 14.4 % (ref 11.5–14.5)
PLATELET # BLD: 275 K/UL (ref 130–400)
POTASSIUM REFLEX MAGNESIUM: 4.1 MEQ/L (ref 3.4–4.9)
PRO-BNP: 2160 PG/ML
PROTHROMBIN TIME: 15 SEC (ref 12.3–14.9)
RBC # BLD: 4.08 M/UL (ref 4.2–5.4)
SARS-COV-2, NAAT: NOT DETECTED
SODIUM BLD-SCNC: 140 MEQ/L (ref 135–144)
TOTAL PROTEIN: 7.3 G/DL (ref 6.3–8)
TROPONIN: <0.01 NG/ML (ref 0–0.01)
TROPONIN: <0.01 NG/ML (ref 0–0.01)
WBC # BLD: 11 K/UL (ref 4.8–10.8)

## 2021-01-25 PROCEDURE — 6370000000 HC RX 637 (ALT 250 FOR IP): Performed by: EMERGENCY MEDICINE

## 2021-01-25 PROCEDURE — 80053 COMPREHEN METABOLIC PANEL: CPT

## 2021-01-25 PROCEDURE — 71045 X-RAY EXAM CHEST 1 VIEW: CPT

## 2021-01-25 PROCEDURE — 2060000000 HC ICU INTERMEDIATE R&B

## 2021-01-25 PROCEDURE — 85025 COMPLETE CBC W/AUTO DIFF WBC: CPT

## 2021-01-25 PROCEDURE — 71275 CT ANGIOGRAPHY CHEST: CPT

## 2021-01-25 PROCEDURE — 36415 COLL VENOUS BLD VENIPUNCTURE: CPT

## 2021-01-25 PROCEDURE — 99285 EMERGENCY DEPT VISIT HI MDM: CPT

## 2021-01-25 PROCEDURE — 6360000004 HC RX CONTRAST MEDICATION: Performed by: EMERGENCY MEDICINE

## 2021-01-25 PROCEDURE — U0002 COVID-19 LAB TEST NON-CDC: HCPCS

## 2021-01-25 PROCEDURE — 85379 FIBRIN DEGRADATION QUANT: CPT

## 2021-01-25 PROCEDURE — 2580000003 HC RX 258: Performed by: INTERNAL MEDICINE

## 2021-01-25 PROCEDURE — 6360000002 HC RX W HCPCS: Performed by: EMERGENCY MEDICINE

## 2021-01-25 PROCEDURE — 84484 ASSAY OF TROPONIN QUANT: CPT

## 2021-01-25 PROCEDURE — 85610 PROTHROMBIN TIME: CPT

## 2021-01-25 PROCEDURE — 85730 THROMBOPLASTIN TIME PARTIAL: CPT

## 2021-01-25 PROCEDURE — 93005 ELECTROCARDIOGRAM TRACING: CPT | Performed by: EMERGENCY MEDICINE

## 2021-01-25 PROCEDURE — 83880 ASSAY OF NATRIURETIC PEPTIDE: CPT

## 2021-01-25 RX ORDER — SODIUM CHLORIDE 0.9 % (FLUSH) 0.9 %
10 SYRINGE (ML) INJECTION EVERY 12 HOURS SCHEDULED
Status: DISCONTINUED | OUTPATIENT
Start: 2021-01-25 | End: 2021-02-02 | Stop reason: HOSPADM

## 2021-01-25 RX ORDER — HYDROCODONE BITARTRATE AND ACETAMINOPHEN 5; 325 MG/1; MG/1
1 TABLET ORAL ONCE
Status: COMPLETED | OUTPATIENT
Start: 2021-01-25 | End: 2021-01-25

## 2021-01-25 RX ORDER — ONDANSETRON 2 MG/ML
4 INJECTION INTRAMUSCULAR; INTRAVENOUS EVERY 6 HOURS PRN
Status: DISCONTINUED | OUTPATIENT
Start: 2021-01-25 | End: 2021-02-02 | Stop reason: HOSPADM

## 2021-01-25 RX ORDER — SODIUM CHLORIDE 0.9 % (FLUSH) 0.9 %
10 SYRINGE (ML) INJECTION
Status: DISCONTINUED | OUTPATIENT
Start: 2021-01-25 | End: 2021-02-02 | Stop reason: HOSPADM

## 2021-01-25 RX ORDER — ACETAMINOPHEN 650 MG/1
650 SUPPOSITORY RECTAL EVERY 6 HOURS PRN
Status: DISCONTINUED | OUTPATIENT
Start: 2021-01-25 | End: 2021-02-02 | Stop reason: HOSPADM

## 2021-01-25 RX ORDER — ASPIRIN 81 MG/1
81 TABLET, CHEWABLE ORAL DAILY
Status: DISCONTINUED | OUTPATIENT
Start: 2021-01-26 | End: 2021-01-26 | Stop reason: SDUPTHER

## 2021-01-25 RX ORDER — PROMETHAZINE HYDROCHLORIDE 12.5 MG/1
12.5 TABLET ORAL EVERY 6 HOURS PRN
Status: DISCONTINUED | OUTPATIENT
Start: 2021-01-25 | End: 2021-02-02 | Stop reason: HOSPADM

## 2021-01-25 RX ORDER — POLYETHYLENE GLYCOL 3350 17 G/17G
17 POWDER, FOR SOLUTION ORAL DAILY PRN
Status: DISCONTINUED | OUTPATIENT
Start: 2021-01-25 | End: 2021-02-02 | Stop reason: HOSPADM

## 2021-01-25 RX ORDER — FUROSEMIDE 10 MG/ML
60 INJECTION INTRAMUSCULAR; INTRAVENOUS ONCE
Status: COMPLETED | OUTPATIENT
Start: 2021-01-25 | End: 2021-01-25

## 2021-01-25 RX ORDER — ACETAMINOPHEN 325 MG/1
650 TABLET ORAL EVERY 6 HOURS PRN
Status: DISCONTINUED | OUTPATIENT
Start: 2021-01-25 | End: 2021-02-02 | Stop reason: HOSPADM

## 2021-01-25 RX ORDER — SODIUM CHLORIDE 0.9 % (FLUSH) 0.9 %
10 SYRINGE (ML) INJECTION PRN
Status: DISCONTINUED | OUTPATIENT
Start: 2021-01-25 | End: 2021-02-02 | Stop reason: HOSPADM

## 2021-01-25 RX ADMIN — HYDROCODONE BITARTRATE AND ACETAMINOPHEN 1 TABLET: 5; 325 TABLET ORAL at 20:50

## 2021-01-25 RX ADMIN — Medication 10 ML: at 22:15

## 2021-01-25 RX ADMIN — FUROSEMIDE 60 MG: 10 INJECTION, SOLUTION INTRAMUSCULAR; INTRAVENOUS at 19:29

## 2021-01-25 RX ADMIN — IOPAMIDOL 100 ML: 612 INJECTION, SOLUTION INTRAVENOUS at 19:52

## 2021-01-25 ASSESSMENT — PAIN DESCRIPTION - LOCATION: LOCATION: ABDOMEN

## 2021-01-25 ASSESSMENT — PAIN DESCRIPTION - ORIENTATION: ORIENTATION: MID;UPPER

## 2021-01-25 NOTE — ED NOTES
Bed: 03  Expected date:   Expected time:   Means of arrival:   Comments:     Asuncion Valderrama RN  01/25/21 2718

## 2021-01-25 NOTE — ED NOTES
Pt took a few steps from walker to bed and pt oxygen level went to 83% RA     [address] [facility] BRICE RN  01/25/21 [id number]

## 2021-01-25 NOTE — ED NOTES
Pt awake and alert. No s/s of distress noted at this time. Pt denies needs at this time. Will continue to monitor.         Jane Velazquez RN  01/25/21 0924

## 2021-01-25 NOTE — ED TRIAGE NOTES
Pt arrived with complaint of shortness of breath. Pt reports the shortness of breath is constant. Pt reports the shortness of breath started 1-2 weeks, but the s/s have been intermittent until two days ago . Pt reports they have taken medication for acid reflux, d/t mid abdominal pain. Pt reports the are not a smoker. Pt reports movement increases the shortness of breath. Pt reports rest decreases the shortness of breath. Pt denies weakness, fatigue, fever, cold sweats, dizziness, vomiting or nausea. Pt is A & O x 4.

## 2021-01-25 NOTE — ED NOTES
Critical lab value given to Dr. Kaela Valenzuela, he is aware     Judie Barger, ADDIE  01/25/21 0494

## 2021-01-25 NOTE — ED PROVIDER NOTES
HPI:  1/25/21, Time: 4:59 PM DIANE Hall is a 80 y.o. female presenting to the ED for shortness of breath, beginning several days ago. The complaint has been persistent, moderate in severity, and worsened by light exertion. When patient ambulated in the emergency room her oxygen level went to 83% on room air. There is been chest pain and chest tightness. No fever no chills. No cough no pleuritic pain no hemoptysis no abdominal pain    ROS:   Pertinent positives and negatives are stated within HPI, all other systems reviewed and are negative.  --------------------------------------------- PAST HISTORY ---------------------------------------------  Past Medical History:  has a past medical history of Ascending aortic aneurysm ([facility] [address] Charcot Arleen Tooth muscular atrophy, Depression, Descending aortic aneurysm ([facility] [address] Essential hypertension, Headache, HTN (hypertension), Impaired mobility and activities of daily living, Lumbar stenosis with neurogenic claudication, Myelopathy ([facility] [address] and Osteoarthritis. Past Surgical History:  has a past surgical history that includes joint replacement. Social History:  reports that she has never smoked. She has never used smokeless tobacco. She reports that she does not drink alcohol or use drugs. Family History: family history includes Arthritis in her father and mother; High Blood Pressure in her father. The patients home medications have been reviewed.     Allergies: Latex and Tape [adhesive tape]    ---------------------------------------------------PHYSICAL EXAM--------------------------------------     Constitutional/General: Alert and oriented x3, well appearing, non toxic in NAD  Head: Normocephalic and atraumatic  Eyes: PERRL, EOMI  Mouth: Oropharynx clear, handling secretions, no trismus  Neck: Supple, full ROM, non tender to palpation in the midline, no stridor, no crepitus, no meningeal signs  Pulmonary: Lungs feel diminished breath - 0.90 mg/dL    GFR Non-African American 50.5 (L) >60    GFR  >60.0 >60    Calcium 8.8 8.5 - 9.9 mg/dL    Total Protein 7.3 6.3 - 8.0 g/dL    Alb 3.5 3.5 - 4.6 g/dL    Total Bilirubin 0.4 0.2 - 0.7 mg/dL    Alkaline Phosphatase 57 40 - 130 U/L    ALT 20 0 - 33 U/L    AST 23 0 - 35 U/L    Globulin 3.8 (H) 2.3 - 3.5 g/dL   Troponin   Result Value Ref Range    Troponin <0.010 0.000 - 0.010 ng/mL   Brain Natriuretic Peptide   Result Value Ref Range    Pro-BNP 2,160 pg/mL   D-Dimer, Quantitative   Result Value Ref Range    D-Dimer, Quant 5.15 (HH) 0.00 - 0.50 mg/L FEU   APTT   Result Value Ref Range    aPTT 30.3 24.4 - 36.8 sec   Protime-INR   Result Value Ref Range    Protime 15.0 (H) 12.3 - 14.9 sec    INR 1.2    COVID-19   Result Value Ref Range    SARS-CoV-2, NAAT Not Detected Not Detected   EKG 12 Lead   Result Value Ref Range    Ventricular Rate 82 BPM    Atrial Rate 82 BPM    P-R Interval 206 ms    QRS Duration 130 ms    Q-T Interval 418 ms    QTc Calculation (Bazett) 488 ms    P Axis 53 degrees    R Axis -67 degrees    T Axis 23 degrees       RADIOLOGY:  Interpreted by Radiologist.  XR CHEST PORTABLE    (Results Pending)   CTA CHEST W WO CONTRAST    (Results Pending)         EKG Interpretation  Interpreted by emergency department physician    Rhythm: normal sinus   Rate: normal  Axis: left  Conduction: normal  ST Segments: no acute change  T Waves: no acute change    Clinical Impression: no acute changes  Comparison to prior EKG: no previous EKG      ------------------------- NURSING NOTES AND VITALS REVIEWED ---------------------------   The nursing notes within the ED encounter and vital signs as below have been reviewed by myself.   /83   Pulse 73   Temp 98.2 °F (36.8 °C) (Temporal)   Resp 22   Ht 5' 4\" (1.626 m)   Wt 165 lb (74.8 kg)   SpO2 93%   BMI 28.32 kg/m²   Oxygen Saturation Interpretation: Normal    The patients available past medical records and past encounters were reviewed. ------------------------------ ED COURSE/MEDICAL DECISION MAKING----------------------  Medications   furosemide (LASIX) injection 60 mg (has no administration in time range)             Medical Decision Making:    EKG reveals normal sinus rhythm. When patient ambulated in the room she desatted to 83% on room air she is not normally on oxygen I have ordered a chest x-ray as well as lab work    X-ray shows CHF BNP level is greater than 2100 she was started on Lasix 60 mg IV    Dimer was elevated I have ordered a CTA of the chest    Re-Evaluations:             Re-evaluation. Patients symptoms are improving      Consultations: This patient's ED course included: re-evaluation prior to disposition, cardiac monitoring, continuous pulse oximetry and a personal history and physicial eaxmination    This patient has remained hemodynamically stable and been closely monitored during their ED course. Counseling: The emergency provider has spoken with the patient and family member patient and son and discussed todays results, in addition to providing specific details for the plan of care and counseling regarding the diagnosis and prognosis. Questions are answered at this time and they are agreeable with the plan.       --------------------------------- IMPRESSION AND DISPOSITION ---------------------------------    IMPRESSION  1. Dyspnea, unspecified type    2. Acute congestive heart failure, unspecified [facility]        DISPOSITION  Disposition: Admit to telemetry  Patient condition is good        NOTE: This report was transcribed using voice recognition software.  Every effort was made to ensure accuracy; however, inadvertent computerized transcription errors may be present          Jordana Parra MD  01/25/21 DAISY Humphrey MD  01/25/21 [id number]

## 2021-01-26 LAB
HCT VFR BLD CALC: 33.4 % (ref 37–47)
HEMOGLOBIN: 10.8 G/DL (ref 12–16)
MCH RBC QN AUTO: 28.4 PG (ref 27–31.3)
MCHC RBC AUTO-ENTMCNC: 32.3 % (ref 33–37)
MCV RBC AUTO: 88.1 FL (ref 82–100)
PDW BLD-RTO: 14.3 % (ref 11.5–14.5)
PLATELET # BLD: 288 K/UL (ref 130–400)
RBC # BLD: 3.79 M/UL (ref 4.2–5.4)
SARS-COV-2, NAAT: NOT DETECTED
TROPONIN: <0.01 NG/ML (ref 0–0.01)
WBC # BLD: 10 K/UL (ref 4.8–10.8)

## 2021-01-26 PROCEDURE — 2060000000 HC ICU INTERMEDIATE R&B

## 2021-01-26 PROCEDURE — 6360000002 HC RX W HCPCS: Performed by: INTERNAL MEDICINE

## 2021-01-26 PROCEDURE — 97162 PT EVAL MOD COMPLEX 30 MIN: CPT

## 2021-01-26 PROCEDURE — 99223 1ST HOSP IP/OBS HIGH 75: CPT | Performed by: INTERNAL MEDICINE

## 2021-01-26 PROCEDURE — 2580000003 HC RX 258: Performed by: INTERNAL MEDICINE

## 2021-01-26 PROCEDURE — 2700000000 HC OXYGEN THERAPY PER DAY

## 2021-01-26 PROCEDURE — 97166 OT EVAL MOD COMPLEX 45 MIN: CPT

## 2021-01-26 PROCEDURE — 6370000000 HC RX 637 (ALT 250 FOR IP): Performed by: INTERNAL MEDICINE

## 2021-01-26 PROCEDURE — 93010 ELECTROCARDIOGRAM REPORT: CPT | Performed by: INTERNAL MEDICINE

## 2021-01-26 PROCEDURE — U0002 COVID-19 LAB TEST NON-CDC: HCPCS

## 2021-01-26 PROCEDURE — 93005 ELECTROCARDIOGRAM TRACING: CPT | Performed by: INTERNAL MEDICINE

## 2021-01-26 PROCEDURE — 36415 COLL VENOUS BLD VENIPUNCTURE: CPT

## 2021-01-26 PROCEDURE — 85027 COMPLETE CBC AUTOMATED: CPT

## 2021-01-26 PROCEDURE — 84484 ASSAY OF TROPONIN QUANT: CPT

## 2021-01-26 PROCEDURE — 97116 GAIT TRAINING THERAPY: CPT

## 2021-01-26 RX ORDER — DILTIAZEM HYDROCHLORIDE 120 MG/1
120 CAPSULE, COATED, EXTENDED RELEASE ORAL 2 TIMES DAILY
Status: DISCONTINUED | OUTPATIENT
Start: 2021-01-26 | End: 2021-02-02 | Stop reason: HOSPADM

## 2021-01-26 RX ORDER — ASPIRIN 81 MG/1
81 TABLET, CHEWABLE ORAL DAILY
Status: DISCONTINUED | OUTPATIENT
Start: 2021-01-26 | End: 2021-02-02 | Stop reason: HOSPADM

## 2021-01-26 RX ORDER — FUROSEMIDE 10 MG/ML
20 INJECTION INTRAMUSCULAR; INTRAVENOUS 2 TIMES DAILY
Status: DISCONTINUED | OUTPATIENT
Start: 2021-01-26 | End: 2021-01-28

## 2021-01-26 RX ORDER — LOSARTAN POTASSIUM 50 MG/1
50 TABLET ORAL 2 TIMES DAILY
Status: DISCONTINUED | OUTPATIENT
Start: 2021-01-26 | End: 2021-01-28

## 2021-01-26 RX ORDER — LORAZEPAM 0.5 MG/1
0.5 TABLET ORAL EVERY 6 HOURS PRN
Status: DISCONTINUED | OUTPATIENT
Start: 2021-01-26 | End: 2021-02-02 | Stop reason: HOSPADM

## 2021-01-26 RX ORDER — HYDRALAZINE HYDROCHLORIDE 10 MG/1
10 TABLET, FILM COATED ORAL EVERY 8 HOURS SCHEDULED
Status: DISCONTINUED | OUTPATIENT
Start: 2021-01-26 | End: 2021-01-28

## 2021-01-26 RX ORDER — LEVOTHYROXINE SODIUM 88 UG/1
88 TABLET ORAL DAILY
Status: DISCONTINUED | OUTPATIENT
Start: 2021-01-26 | End: 2021-02-02 | Stop reason: HOSPADM

## 2021-01-26 RX ORDER — CITALOPRAM 20 MG/1
20 TABLET ORAL DAILY
Status: DISCONTINUED | OUTPATIENT
Start: 2021-01-26 | End: 2021-02-02 | Stop reason: HOSPADM

## 2021-01-26 RX ORDER — POTASSIUM CHLORIDE 20 MEQ/1
20 TABLET, EXTENDED RELEASE ORAL
Status: DISCONTINUED | OUTPATIENT
Start: 2021-01-26 | End: 2021-02-02 | Stop reason: HOSPADM

## 2021-01-26 RX ADMIN — LOSARTAN POTASSIUM 50 MG: 50 TABLET, FILM COATED ORAL at 10:22

## 2021-01-26 RX ADMIN — CITALOPRAM HYDROBROMIDE 20 MG: 20 TABLET ORAL at 10:22

## 2021-01-26 RX ADMIN — LORAZEPAM 0.5 MG: 0.5 TABLET ORAL at 18:03

## 2021-01-26 RX ADMIN — POTASSIUM CHLORIDE 20 MEQ: 20 TABLET, EXTENDED RELEASE ORAL at 10:22

## 2021-01-26 RX ADMIN — FUROSEMIDE 20 MG: 10 INJECTION, SOLUTION INTRAVENOUS at 10:23

## 2021-01-26 RX ADMIN — FUROSEMIDE 20 MG: 10 INJECTION, SOLUTION INTRAVENOUS at 18:03

## 2021-01-26 RX ADMIN — HYDRALAZINE HYDROCHLORIDE 10 MG: 10 TABLET, FILM COATED ORAL at 10:22

## 2021-01-26 RX ADMIN — LOSARTAN POTASSIUM 50 MG: 50 TABLET, FILM COATED ORAL at 18:03

## 2021-01-26 RX ADMIN — Medication 10 ML: at 21:00

## 2021-01-26 RX ADMIN — HYDRALAZINE HYDROCHLORIDE 10 MG: 10 TABLET, FILM COATED ORAL at 22:36

## 2021-01-26 RX ADMIN — DILTIAZEM HYDROCHLORIDE 120 MG: 120 CAPSULE, COATED, EXTENDED RELEASE ORAL at 10:22

## 2021-01-26 RX ADMIN — ASPIRIN 81 MG: 81 TABLET, CHEWABLE ORAL at 10:22

## 2021-01-26 RX ADMIN — ENOXAPARIN SODIUM 80 MG: 80 INJECTION SUBCUTANEOUS at 10:21

## 2021-01-26 RX ADMIN — DILTIAZEM HYDROCHLORIDE 120 MG: 120 CAPSULE, COATED, EXTENDED RELEASE ORAL at 21:04

## 2021-01-26 RX ADMIN — LEVOTHYROXINE SODIUM 88 MCG: 88 TABLET ORAL at 10:25

## 2021-01-26 ASSESSMENT — ENCOUNTER SYMPTOMS
GASTROINTESTINAL NEGATIVE: 1
WHEEZING: 0
SHORTNESS OF BREATH: 1
NAUSEA: 0
ALLERGIC/IMMUNOLOGIC NEGATIVE: 1
EYES NEGATIVE: 1
ABDOMINAL PAIN: 0
VOMITING: 0

## 2021-01-26 ASSESSMENT — PAIN SCALES - GENERAL: PAINLEVEL_OUTOF10: 0

## 2021-01-26 NOTE — ED PROVIDER NOTES
Spoke with Cardiologist  Accepted admission  Please see Dr Junior Duty Note for H&P     Letty Hleton MD  01/26/21 2208

## 2021-01-26 NOTE — PROGRESS NOTES
Physical Therapy Med Surg Initial Assessment  [facility]       NAME: Hank Phan  : 1932 (80 y.o.)  MRN: [id number]  CODE STATUS: Full Code    Date of Service: 2021    Patient Diagnosis(es): Acute on chronic combined systolic and diastolic CHF (congestive heart failure) ([address] [I50.43]   Chief Complaint   Patient presents with    Shortness of Breath     sob last 3 days     Patient Active Problem List    Diagnosis Date Noted    Impaired mobility and activities of daily living due to [employer], severe lumbar canal stenosis s/p Rt and Rui L3-4, L4-5 microdissection and decompression, [facility] admit 16      Priority: High    Lumbar stenosis with neurogenic claudication 2016     Priority: Medium    Acute on chronic combined systolic and diastolic CHF (congestive heart failure) ([address] 2021    Essential hypertension 2018    Shortness of breath     Dizziness     Ascending aortic aneurysm ([address] 2017    Descending aortic aneurysm ([address] 2017    Osteoarthritis     Myelopathy ([address]     Headache     Depression     Acquired scoliosis 2016    Osteoporosis 2016    Charcot Arleen Tooth muscular atrophy 2016    Excess weight 2016    Osteoarthritis of lumbar spine with myelopathy 2016        Past Medical History:   Diagnosis Date    Ascending aortic aneurysm ([address] 2017    Charcot Arleen Tooth muscular atrophy     Depression     Descending aortic aneurysm ([address] 2017    Essential hypertension 2018    Headache     HTN (hypertension)     Impaired mobility and activities of daily living     Lumbar stenosis with neurogenic claudication     Myelopathy ([address]     Osteoarthritis      Past Surgical History:   Procedure Laterality Date    JOINT REPLACEMENT         Chart Reviewed: Yes  Family / Caregiver Present: No    Restrictions:  Restrictions/Precautions: Fall Risk SUBJECTIVE:      Pain  Pre Treatment Pain Screening  Pain at present: 0    Post Treatment Pain Screening:   Pain Assessment  Pain Level: 0    Prior Level of Function:  Social/Functional History  Lives With: Alone  Type of Home: House  Home Layout: One level  Home Access: Stairs to enter with rails  Entrance Stairs - Number of Steps: 2  Entrance Stairs - Rails: Both  Bathroom Shower/Tub: Tub/Shower unit  Bathroom Equipment: Grab bars in shower, Shower chair  Home Equipment: Rolling walker, Cane(Pt infrequemtly uses DME for ambulation and prefers to furniture walk in home)  ADL Assistance: [address]: Independent  Homemaking Responsibilities: Yes  Ambulation Assistance: Independent  Transfer Assistance: Independent  Additional Comments: Son stops over 2 times daily    OBJECTIVE:   Vision: Within Functional Limits  Hearing: Exceptions to [facility]  Hearing Exceptions: Hard of hearing/hearing concerns; No hearing aid    Cognition:  Overall Orientation Status: Impaired  Orientation Level: Oriented to person  Follows Commands: Within Functional Limits(pt is able to follow simple English instructions when accompanied by gestures/demonstration. Pt speaks a minimal amount of English. [place] is primary language and per  note this is not available on translation services)         ROM:  RLE PROM: WFL  LLE PROM: WFL    Strength:  Strength RLE  Comment: 4-/5 except 2-/5 df  Strength LLE  Comment: 4-/5 except 2-/5 df    Neuro:  Balance  Sitting - Static: Good  Sitting - Dynamic: Good  Standing - Static: Fair;-(Pt able to astand 30 sec without UE support with min assist)  Standing - Dynamic: Poor(Pt with constant LOB in gait with no device. Mildly unsteady in gait with ww with min assist for correction required)             Bed mobility  Rolling to Left: Minimal assistance  Rolling to Right: Minimal assistance  Supine to Sit: Minimal assistance; Moderate assistance  Sit to Supine: Minimal assistance; Moderate

## 2021-01-26 NOTE — H&P
Shortness of breath    Essential hypertension    Acute on chronic combined systolic and diastolic CHF (congestive heart failure) (HCC)       Past Surgical History:   Procedure Laterality Date    JOINT REPLACEMENT         Social History     Socioeconomic History    Marital status:       Spouse name: None    Number of children: None    Years of education: None    Highest education level: None   Occupational History    None   Social Needs    Financial resource strain: None    Food insecurity     Worry: None     Inability: None    Transportation needs     Medical: None     Non-medical: None   Tobacco Use    Smoking status: Never Smoker    Smokeless tobacco: Never Used   Substance and Sexual Activity    Alcohol use: No     Alcohol/week: 0.0 standard drinks    Drug use: No    Sexual activity: None   Lifestyle    Physical activity     Days per week: None     Minutes per session: None    Stress: None   Relationships    Social connections     Talks on phone: None     Gets together: None     Attends Confucianist service: None     Active member of club or organization: None     Attends meetings of clubs or organizations: None     Relationship status: None    Intimate partner violence     Fear of current or ex partner: None     Emotionally abused: None     Physically abused: None     Forced sexual activity: None   Other Topics Concern    None   Social History Narrative    None       Family History   Problem Relation Age of Onset    Arthritis Mother     Arthritis Father     High Blood Pressure Father        Current Facility-Administered Medications   Medication Dose Route Frequency Provider Last Rate Last Admin    enoxaparin (LOVENOX) injection 80 mg  1 mg/kg Subcutaneous Daily Jorge Franco, DO   80 mg at 01/26/21 1021    aspirin chewable tablet 81 mg  81 mg Oral Daily Jorge Franco DO   81 mg at 01/26/21 1022    citalopram (CELEXA) tablet 20 mg  20 mg Oral Daily Jorge Franco, DO   20 mg at 01/26/21 1022    dilTIAZem (CARDIZEM CD) extended release capsule 120 mg  120 mg Oral BID [facility] Holiday, DO   120 mg at 01/26/21 1022    hydrALAZINE (APRESOLINE) tablet 10 mg  10 mg Oral 3 times per day [facility] Holiday, DO   10 mg at 01/26/21 1022    potassium chloride (KLOR-CON M) extended release tablet 20 mEq  20 mEq Oral Daily with breakfast [facility] Holiday, DO   20 mEq at 01/26/21 1022    losartan (COZAAR) tablet 50 mg  50 mg Oral BID [facility] Holiday, DO   50 mg at 01/26/21 1022    levothyroxine (SYNTHROID) tablet 88 mcg  88 mcg Oral Daily [facility] Holiday, DO   88 mcg at 01/26/21 1025    furosemide (LASIX) injection 20 mg  20 mg Intravenous BID [facility] Holiday, DO   20 mg at 01/26/21 1023    sodium chloride flush 0.9 % injection 10 mL  10 mL Intravenous ONCE PRN Laurie Howard MD        sodium chloride flush 0.9 % injection 10 mL  10 mL Intravenous 2 times per day [facility] Holiday, DO   10 mL at 01/25/21 2215    sodium chloride flush 0.9 % injection 10 mL  10 mL Intravenous PRN [facility] Holiday, DO        promethazine (PHENERGAN) tablet 12.5 mg  12.5 mg Oral Q6H PRN [facility] Holiday, DO        Or    ondansetron (ZOFRAN) injection 4 mg  4 mg Intravenous Q6H PRN [facility] Holiday, DO        acetaminophen (TYLENOL) tablet 650 mg  650 mg Oral Q6H PRN [facility] Holiday, DO        Or    acetaminophen (TYLENOL) suppository 650 mg  650 mg Rectal Q6H PRN [facility] Holiday, DO        polyethylene glycol (GLYCOLAX) packet 17 g  17 g Oral Daily PRN [facility] Holiday, DO           ALLERGIES: Latex and Tape [adhesive tape]    Review of Systems   Constitutional: Negative. Negative for chills and fever. HENT: Negative. Eyes: Negative. Respiratory: Positive for shortness of breath. Negative for wheezing. Cardiovascular: Positive for chest pain and palpitations. Negative for leg swelling. Gastrointestinal: Negative. Negative for abdominal pain, nausea and vomiting. Endocrine: Negative. Genitourinary: Negative.     Musculoskeletal: Negative. Skin: Negative. Negative for rash. Allergic/Immunologic: Negative. Neurological: Negative for dizziness, weakness and headaches. Hematological: Negative. Psychiatric/Behavioral: Negative. VITALS:  Blood pressure (!) 131/96, pulse 115, temperature 97.7 °F (36.5 °C), resp. rate 22, height 5' 4\" (1.626 m), weight 165 lb 8 oz (75.1 kg), SpO2 91 %. Body mass index is 28.41 kg/m². Physical Exam   Constitutional: She is oriented to person, place, and time. She appears well-developed and well-nourished. HENT:   Head: Normocephalic and atraumatic. Eyes: Pupils are equal, round, and reactive to light. Neck: Normal range of motion. Neck supple. No JVD present. No tracheal deviation present. No thyromegaly present. Cardiovascular: Normal rate, regular rhythm, normal heart sounds and intact distal pulses. PMI is not displaced. Exam reveals no gallop, no S3, no distant heart sounds and no friction rub. No murmur heard. Pulmonary/Chest: No respiratory distress. She has no wheezes. She has no rales. She exhibits no tenderness. Abdominal: Soft. Bowel sounds are normal. She exhibits no distension and no mass. There is no abdominal tenderness. There is no rebound and no guarding. Musculoskeletal:         General: No edema. Neurological: She is alert and oriented to person, place, and time. No cranial nerve deficit. Skin: Skin is warm and dry. No rash noted. She is not diaphoretic. No erythema. No pallor. Psychiatric: She has a normal mood and affect.  Her behavior is normal. Judgment and thought content normal.       LABS:  Recent Results (from the past 24 hour(s))   CBC Auto Differential    Collection Time: 01/25/21  5:00 PM   Result Value Ref Range    WBC 11.0 (H) 4.8 - 10.8 K/uL    RBC 4.08 (L) 4.20 - 5.40 M/uL    Hemoglobin 11.6 (L) 12.0 - 16.0 g/dL    Hematocrit 36.1 (L) 37.0 - 47.0 %    MCV 88.6 82.0 - 100.0 fL    MCH 28.5 27.0 - 31.3 pg    MCHC 32.1 (L) 33.0 - 37.0 % RDW 14.4 11.5 - 14.5 %    Platelets 730 056 - 538 K/uL    Neutrophils % 80.8 %    Lymphocytes % 9.0 %    Monocytes % 8.7 %    Eosinophils % 1.1 %    Basophils % 0.4 %    Neutrophils Absolute 8.9 (H) 1.4 - 6.5 K/uL    Lymphocytes Absolute 1.0 1.0 - 4.8 K/uL    Monocytes Absolute 1.0 (H) 0.2 - 0.8 K/uL    Eosinophils Absolute 0.1 0.0 - 0.7 K/uL    Basophils Absolute 0.0 0.0 - 0.2 K/uL   Comprehensive Metabolic Panel w/ Reflex to MG    Collection Time: 01/25/21  5:00 PM   Result Value Ref Range    Sodium 140 135 - 144 mEq/L    Potassium reflex Magnesium 4.1 3.4 - 4.9 mEq/L    Chloride 105 95 - 107 mEq/L    CO2 24 20 - 31 mEq/L    Anion Gap 11 9 - 15 mEq/L    Glucose 117 (H) 70 - 99 mg/dL    BUN 25 (H) 8 - 23 mg/dL    CREATININE 1.03 (H) 0.50 - 0.90 mg/dL    GFR Non-African American 50.5 (L) >60    GFR  >60.0 >60    Calcium 8.8 8.5 - 9.9 mg/dL    Total Protein 7.3 6.3 - 8.0 g/dL    Albumin 3.5 3.5 - 4.6 g/dL    Total Bilirubin 0.4 0.2 - 0.7 mg/dL    Alkaline Phosphatase 57 40 - 130 U/L    ALT 20 0 - 33 U/L    AST 23 0 - 35 U/L    Globulin 3.8 (H) 2.3 - 3.5 g/dL   Troponin    Collection Time: 01/25/21  5:00 PM   Result Value Ref Range    Troponin <0.010 0.000 - 0.010 ng/mL   Brain Natriuretic Peptide    Collection Time: 01/25/21  5:00 PM   Result Value Ref Range    Pro-BNP 2,160 pg/mL   D-Dimer, Quantitative    Collection Time: 01/25/21  5:00 PM   Result Value Ref Range    D-Dimer, Quant 5.15 (HH) 0.00 - 0.50 mg/L FEU   APTT    Collection Time: 01/25/21  5:00 PM   Result Value Ref Range    aPTT 30.3 24.4 - 36.8 sec   Protime-INR    Collection Time: 01/25/21  5:00 PM   Result Value Ref Range    Protime 15.0 (H) 12.3 - 14.9 sec    INR 1.2    EKG 12 Lead    Collection Time: 01/25/21  5:16 PM   Result Value Ref Range    Ventricular Rate 82 BPM    Atrial Rate 82 BPM    P-R Interval 206 ms    QRS Duration 130 ms    Q-T Interval 418 ms    QTc Calculation (Bazett) 488 ms    P Axis 53 degrees    R Axis -67 degrees T Axis 23 degrees   COVID-19    Collection Time: 01/25/21  5:44 PM   Result Value Ref Range    SARS-CoV-2, NAAT Not Detected Not Detected   Troponin    Collection Time: 01/25/21 10:48 PM   Result Value Ref Range    Troponin <0.010 0.000 - 0.010 ng/mL   Troponin    Collection Time: 01/26/21  2:51 AM   Result Value Ref Range    Troponin <0.010 0.000 - 0.010 ng/mL   CBC    Collection Time: 01/26/21  3:45 AM   Result Value Ref Range    WBC 10.0 4.8 - 10.8 K/uL    RBC 3.79 (L) 4.20 - 5.40 M/uL    Hemoglobin 10.8 (L) 12.0 - 16.0 g/dL    Hematocrit 33.4 (L) 37.0 - 47.0 %    MCV 88.1 82.0 - 100.0 fL    MCH 28.4 27.0 - 31.3 pg    MCHC 32.3 (L) 33.0 - 37.0 %    RDW 14.3 11.5 - 14.5 %    Platelets 256 393 - 440 K/uL   EKG 12 Lead    Collection Time: 01/26/21  5:06 AM   Result Value Ref Range    Ventricular Rate 94 BPM    Atrial Rate 306 BPM    QRS Duration 138 ms    Q-T Interval 432 ms    QTc Calculation (Bazett) 540 ms    R Axis -64 degrees    T Axis -61 degrees     Troponin:   Lab Results   Component Value Date    TROPONINI <0.010 01/26/2021       EKG: atrial fibrillation, right bundle branch block      ASSESSMENT:      Acute on chronic decompensated diastolic congestive heart failure. Paroxysmal atrial fibrillation with rapid ventricle response  History of coronary disease  History of SVT status post ablation  History of ascending and descending thoracic aortic aneurysm  Essential hypertension        PLAN:   1. As always, aggressive risk factor modification is strongly recommended. We should adhere to the JNC VIII guidelines for HTN management and the NCEPATP III guidelines for LDL-C management. 2. Continue with IV diuresis as navi, monitor I/O's, renal function, electrolytes. Keep K+>4 and Mg>2  3. Max cardiac meds  4. Full dose anticoagulation, convert to oral prior to discharge  5. Check 2D Echo for LV function, PA pressures, wall motion abnormalities and any significant valvular disease.   6. Monitor on telemetry  7. Consider coronary evaluation in the future  8. Further recommendations to follow.     Electronically signed by Alberta Coker DO on 1/26/2021 at 4:42 PM

## 2021-01-26 NOTE — ED NOTES
Pt awake and alert. No s/s of distress noted at this time. Pt denies needs at this time. Will continue to monitor.         Padma Lara RN  01/25/21 1932

## 2021-01-26 NOTE — FLOWSHEET NOTE
Dr. Sung Gabriel notified about the patient going into a fib at this time. Vitals are stable and EKG performed.    Electronically signed by Marisela Rayo RN on 1/26/2021 at 5:16 AM

## 2021-01-26 NOTE — CARE COORDINATION
PT recommends possible rehab. The LSW updated the . [facility] eval was obtained.   The LSW notified Mina Misha, of the referral.  Electronically signed by JACKLYN Moran on 1/26/21 at 3:53 PM EST

## 2021-01-26 NOTE — ED NOTES
Report to [facility] ROSETTE GUZMAN, RN  01/25/21 [id number]

## 2021-01-26 NOTE — PROGRESS NOTES
[facility] OCCUPATIONAL THERAPY EVALUATION - ACUTE     NAME: Annetta Morrow  : 1932 (80 y.o.)  MRN: [id number]  CODE STATUS: Full Code  [facility]    Date of Service: 2021    Patient Diagnosis(es): Acute on chronic combined systolic and diastolic CHF (congestive heart failure) ([address] [I50.43]   Chief Complaint   Patient presents with    Shortness of Breath     sob last 3 days     Patient Active Problem List    Diagnosis Date Noted    Impaired mobility and activities of daily living due to [employer], severe lumbar canal stenosis s/p Rt and Rui L3-4, L4-5 microdissection and decompression, [facility] admit 16      Priority: High    Lumbar stenosis with neurogenic claudication 2016     Priority: Medium    Acute on chronic combined systolic and diastolic CHF (congestive heart failure) ([address] 2021    Essential hypertension 2018    Shortness of breath     Dizziness     Ascending aortic aneurysm ([address] 2017    Descending aortic aneurysm ([address] 2017    Osteoarthritis     Myelopathy ([address]     Headache     Depression     Acquired scoliosis 2016    Osteoporosis 2016    Charcot Arleen Tooth muscular atrophy 2016    Excess weight 2016    Osteoarthritis of lumbar spine with myelopathy 2016        Past Medical History:   Diagnosis Date    Ascending aortic aneurysm ([address] 2017    Charcot Arleen Tooth muscular atrophy     Depression     Descending aortic aneurysm ([address] 2017    Essential hypertension 2018    Headache     HTN (hypertension)     Impaired mobility and activities of daily living     Lumbar stenosis with neurogenic claudication     Myelopathy ([address]     Osteoarthritis      Past Surgical History:   Procedure Laterality Date    JOINT REPLACEMENT          Restrictions:Fall, O2        Safety Devices: Safety Devices  Safety Devices in place: Yes  Type of devices:  All fall risk precautions in place Initially in place: No    Subjective:Pt seen with son present. Pt's son interpreted secondary to patient speaks mainly [place] and very minimal english. Pt is also [facility]. Son provided home set up information. Pain Reassessment: 0/10 reported          Prior Level of Function:  Social/Functional History  Lives With: Alone  Type of Home: House  Home Layout: One level  Home Access: Stairs to enter with rails  Entrance Stairs - Number of Steps: 2  Entrance Stairs - Rails: Both  Bathroom Shower/Tub: Tub/Shower unit  Bathroom Equipment: Grab bars in shower, Shower chair  Home Equipment: Rolling walker, Cane(Pt infrequemtly uses DME for ambulation and prefers to furniture walk in home)  ADL Assistance: [address] [place] [address] Avenue: Independent  Homemaking Responsibilities: Yes  Ambulation Assistance: Independent  Transfer Assistance: Independent  Additional Comments: Son stops over 2 times daily    OBJECTIVE:     Orientation Status:  Orientation  Overall Orientation Status: Within Functional Limits(person and place)    Observation:  Observation/Palpation  Posture: Fair  Observation: mild flexion noted    Cognition Status:  Cognition  Cognition Comment: Pt follows one step commands with incresased time.   pt exhibiting increased anxiety when completing tasks    Perception Status:  Perception  Overall Perceptual Status: WFL    Sensation Status:  Sensation  Overall Sensation Status: WFL    Vision and Hearing Status:  Vision  Vision: Within Functional Limits  Hearing  Hearing: Exceptions to [facility]  Hearing Exceptions: Hard of hearing/hearing concerns, No hearing aid     ROM:   LUE AROM (degrees)  LUE AROM : WFL  Left Hand AROM (degrees)  Left Hand AROM: WFL  RUE AROM (degrees)  RUE AROM : WFL  Right Hand AROM (degrees)  Right Hand AROM: WFL    Strength:  LUE Strength  Gross LUE Strength: WFL  L Hand General: 4/5  RUE Strength  Gross RUE Strength: WFL  R Hand General: 4/5    Coordination, Tone, Quality of Movement: Tone RUE  RUE Tone: Normotonic  Tone LUE  LUE Tone: Normotonic  Coordination  Movements Are Fluid And Coordinated: Yes    Hand Dominance:  Hand Dominance  Hand Dominance: Right    ADL Status:  ADL  Feeding: Unable to assess(comment)  Grooming: Stand by assistance  UE Bathing: Minimal assistance  LE Bathing: Moderate assistance  UE Dressing: Minimal assistance  LE Dressing: Maximum assistance  Toileting: Unable to assess(comment)          Therapy key for assistance levels -   Independent = Pt. is able to perform task with no assistance but may require a device   Stand by assistance = Pt. does not perform task at an independent level but does not need physical assistance, requires verbal cues  Minimal, Moderate, Maximal Assistance = Pt. requires physical assistance (25%, 50%, 75% assist from helper) for task but is able to actively participate in task   Dependent = Pt. requires total assistance with task and is not able to actively participate with task completion     Functional Mobility:     Transfers  Sit to stand: Minimal assistance  Stand to sit: Minimal assistance    Bed Mobility  Bed mobility  Supine to Sit: Maximum assistance  Sit to Supine:  Moderate assistance    Seated and Standing Balance:  Balance  Sitting Balance: Supervision  Standing Balance: Minimal assistance    Functional Endurance:  Activity Tolerance  Activity Tolerance: Treatment limited secondary to medical complications (free text), Patient limited by fatigue    D/C Recommendations:  OT D/C RECOMMENDATIONS  REQUIRES OT FOLLOW UP: No    Equipment Recommendations:       OT Education:        OT Follow Up:  OT D/C RECOMMENDATIONS  REQUIRES OT FOLLOW UP: No       Assessment/Discharge Disposition:     Performance deficits / Impairments: Decreased functional mobility , Decreased balance, Decreased ADL status, Decreased high-level IADLs, Decreased endurance, Decreased strength, Decreased posture  Prognosis: Good  Discharge Recommendations: Continue to assess pending progress  Decision Making: Medium Complexity  History: 10 complexities  Exam: 7 complexities  Assistance / Modification: Max A    Six Click Score    How much help for putting on and taking off regular lower body clothing?: A Lot  How much help for Bathing?: A Lot  How much help for Toileting?: A Little  How much help for putting on and taking off regular upper body clothing?: A Little  How much help for taking care of personal grooming?: None  How much help for eating meals?: None  AM-PAC Inpatient Daily Activity Raw Score: 18  AM-PAC Inpatient ADL T-Scale Score : 38.66  ADL Inpatient CMS 0-100% Score: 46.65    Plan:  Plan  Times per week: 1-4x/wk  Current Treatment Recommendations: Strengthening, Endurance Training, Neuromuscular Re-education, Self-Care / ADL, Balance Training, Functional Mobility Training    Goals:   Patient will:    - Improve functional endurance to tolerate/complete 8-12 mins of ADL's  - Be SBA in UB ADLs   - Be Min A in LB ADLs  - Be SBA in ADL transfers without LOB  - Be SBA in toileting tasks  - Improve bilateral UE strength and endurance to Fair in order to participate in self-care activities as projected. - Access appropriate D/C site with as few architectural barriers as possible.     Patient Goal: Patient goals : Not stated at this time      Discussed and agreed upon: Yes Comments:     Therapy Time:   OT Individual Minutes  Time In: 1025  Time Out: 1045  Minutes: 20    Eval: 20 minutes     Electronically signed by:    SERAFIN Casiano  4/96/3320, 12:54 PM Electronically signed by SERAFIN Casiano on 8/55/13 at 12:51 PM EST

## 2021-01-26 NOTE — CARE COORDINATION
[facility] [place] Case Management Initial Discharge Assessment    The LSW talked to the patient's son, Anmol Richardson, to discuss the discharge plan. PCP: Kristel Durham MD                                Date of Last Visit: Approximately 2 months ago per the patient's son. If no PCP, list provided? N/A    Discharge Planning    Living Arrangements: independently at home    Who do you live with? self    Who helps you with your care: Son lives near by    If lives at home:     Do you have any barriers navigating in your home? No      Patient can perform ADL? Yes    Current Services (outpatient and in home) :  None    Dialysis: No    Is transportation available to get to your appointments? Yes - Son transports the patient to appointment. DME Equipment:  Per the patient's son, the patient has a cane and several walkers. The patient's son states the patient does not use any devices, but does try to encourage her. Respiratory equipment: None      Respiratory provider:  N/A      Pharmacy:  [facility] [address]; [address] with Medication Assistance Program?  No      [facility] [address]? Son is not sure the patient would agree to [address] - possibly     Patient agreeable to SNF/Rehab? Await PT. The patient's son states the patient went to [facility] 3 years ago after back surgery. Other discharge needs identified? N/A    Freedom of choice list provided with basic dialogue that supports the patient's individualized plan of care/goals and shares the quality data associated with the providers. Freedom of choice was discussed.  Does Patient Have a High-Risk for Readmission Diagnosis (CHF, PN, MI, COPD)? The plan for Transition of Care is related to the following treatment goals:Await PT    Initial Discharge Plan? (Note: please see concurrent daily documentation for any updates after initial note). The LSW talked to the patient's on, Anmol Richardson.   Per nursing, the  services do

## 2021-01-27 PROBLEM — R26.9 GAIT ABNORMALITY: Status: ACTIVE | Noted: 2021-01-27

## 2021-01-27 LAB
ANION GAP SERPL CALCULATED.3IONS-SCNC: 15 MEQ/L (ref 9–15)
BACTERIA: NEGATIVE /HPF
BILIRUBIN URINE: NEGATIVE
BLOOD, URINE: ABNORMAL
BUN BLDV-MCNC: 22 MG/DL (ref 8–23)
CALCIUM SERPL-MCNC: 8.8 MG/DL (ref 8.5–9.9)
CHLORIDE BLD-SCNC: 103 MEQ/L (ref 95–107)
CLARITY: CLEAR
CO2: 24 MEQ/L (ref 20–31)
COLOR: YELLOW
CREAT SERPL-MCNC: 0.76 MG/DL (ref 0.5–0.9)
EPITHELIAL CELLS, UA: ABNORMAL /HPF (ref 0–5)
GFR AFRICAN AMERICAN: >60
GFR NON-AFRICAN AMERICAN: >60
GLUCOSE BLD-MCNC: 175 MG/DL (ref 70–99)
GLUCOSE URINE: NEGATIVE MG/DL
HCT VFR BLD CALC: 36.8 % (ref 37–47)
HEMOGLOBIN: 11.9 G/DL (ref 12–16)
HYALINE CASTS: ABNORMAL /HPF (ref 0–5)
KETONES, URINE: NEGATIVE MG/DL
LEUKOCYTE ESTERASE, URINE: NEGATIVE
LV EF: 40 %
LVEF MODALITY: NORMAL
MCH RBC QN AUTO: 28.5 PG (ref 27–31.3)
MCHC RBC AUTO-ENTMCNC: 32.4 % (ref 33–37)
MCV RBC AUTO: 88 FL (ref 82–100)
NITRITE, URINE: NEGATIVE
PDW BLD-RTO: 14 % (ref 11.5–14.5)
PH UA: 5 (ref 5–9)
PLATELET # BLD: 310 K/UL (ref 130–400)
POTASSIUM SERPL-SCNC: 3.8 MEQ/L (ref 3.4–4.9)
PROTEIN UA: ABNORMAL MG/DL
RBC # BLD: 4.18 M/UL (ref 4.2–5.4)
RBC UA: >100 /HPF (ref 0–5)
SODIUM BLD-SCNC: 142 MEQ/L (ref 135–144)
SPECIFIC GRAVITY UA: 1.02 (ref 1–1.03)
URINE REFLEX TO CULTURE: ABNORMAL
UROBILINOGEN, URINE: 0.2 E.U./DL
WBC # BLD: 11.1 K/UL (ref 4.8–10.8)
WBC UA: ABNORMAL /HPF (ref 0–5)

## 2021-01-27 PROCEDURE — 93306 TTE W/DOPPLER COMPLETE: CPT

## 2021-01-27 PROCEDURE — 6370000000 HC RX 637 (ALT 250 FOR IP): Performed by: INTERNAL MEDICINE

## 2021-01-27 PROCEDURE — 2060000000 HC ICU INTERMEDIATE R&B

## 2021-01-27 PROCEDURE — 99233 SBSQ HOSP IP/OBS HIGH 50: CPT | Performed by: INTERNAL MEDICINE

## 2021-01-27 PROCEDURE — 80048 BASIC METABOLIC PNL TOTAL CA: CPT

## 2021-01-27 PROCEDURE — 85027 COMPLETE CBC AUTOMATED: CPT

## 2021-01-27 PROCEDURE — 2580000003 HC RX 258: Performed by: INTERNAL MEDICINE

## 2021-01-27 PROCEDURE — 6360000002 HC RX W HCPCS: Performed by: INTERNAL MEDICINE

## 2021-01-27 PROCEDURE — 36415 COLL VENOUS BLD VENIPUNCTURE: CPT

## 2021-01-27 PROCEDURE — 2700000000 HC OXYGEN THERAPY PER DAY

## 2021-01-27 PROCEDURE — 81001 URINALYSIS AUTO W/SCOPE: CPT

## 2021-01-27 RX ORDER — CARVEDILOL 3.12 MG/1
3.12 TABLET ORAL 2 TIMES DAILY
Status: DISCONTINUED | OUTPATIENT
Start: 2021-01-27 | End: 2021-02-02 | Stop reason: HOSPADM

## 2021-01-27 RX ADMIN — CARVEDILOL 3.12 MG: 3.12 TABLET, FILM COATED ORAL at 11:39

## 2021-01-27 RX ADMIN — DILTIAZEM HYDROCHLORIDE 120 MG: 120 CAPSULE, COATED, EXTENDED RELEASE ORAL at 09:07

## 2021-01-27 RX ADMIN — CARVEDILOL 3.12 MG: 3.12 TABLET, FILM COATED ORAL at 20:02

## 2021-01-27 RX ADMIN — Medication 10 ML: at 21:06

## 2021-01-27 RX ADMIN — LEVOTHYROXINE SODIUM 88 MCG: 88 TABLET ORAL at 05:38

## 2021-01-27 RX ADMIN — ASPIRIN 81 MG: 81 TABLET, CHEWABLE ORAL at 09:07

## 2021-01-27 RX ADMIN — CITALOPRAM HYDROBROMIDE 20 MG: 20 TABLET ORAL at 09:07

## 2021-01-27 RX ADMIN — LOSARTAN POTASSIUM 50 MG: 50 TABLET, FILM COATED ORAL at 19:19

## 2021-01-27 RX ADMIN — FUROSEMIDE 20 MG: 10 INJECTION, SOLUTION INTRAVENOUS at 09:07

## 2021-01-27 RX ADMIN — DILTIAZEM HYDROCHLORIDE 120 MG: 120 CAPSULE, COATED, EXTENDED RELEASE ORAL at 20:02

## 2021-01-27 RX ADMIN — LOSARTAN POTASSIUM 50 MG: 50 TABLET, FILM COATED ORAL at 09:06

## 2021-01-27 RX ADMIN — POTASSIUM CHLORIDE 20 MEQ: 20 TABLET, EXTENDED RELEASE ORAL at 09:07

## 2021-01-27 RX ADMIN — HYDRALAZINE HYDROCHLORIDE 10 MG: 10 TABLET, FILM COATED ORAL at 05:38

## 2021-01-27 RX ADMIN — Medication 10 ML: at 09:14

## 2021-01-27 RX ADMIN — FUROSEMIDE 20 MG: 10 INJECTION, SOLUTION INTRAVENOUS at 19:19

## 2021-01-27 RX ADMIN — HYDRALAZINE HYDROCHLORIDE 10 MG: 10 TABLET, FILM COATED ORAL at 22:06

## 2021-01-27 RX ADMIN — ENOXAPARIN SODIUM 80 MG: 80 INJECTION SUBCUTANEOUS at 09:06

## 2021-01-27 SDOH — SOCIAL STABILITY: SOCIAL NETWORK: ARE YOU MARRIED, WIDOWED, DIVORCED, SEPARATED, NEVER MARRIED, OR LIVING WITH A PARTNER?: WIDOWED

## 2021-01-27 SDOH — SOCIAL STABILITY: SOCIAL NETWORK: HOW OFTEN DO YOU ATTEND CHURCH OR RELIGIOUS SERVICES?: NOT ASKED

## 2021-01-27 SDOH — SOCIAL STABILITY: SOCIAL INSECURITY: WITHIN THE LAST YEAR, HAVE YOU BEEN HUMILIATED OR EMOTIONALLY ABUSED IN OTHER WAYS BY YOUR PARTNER OR EX-PARTNER?: NO

## 2021-01-27 SDOH — SOCIAL STABILITY: SOCIAL INSECURITY
WITHIN THE LAST YEAR, HAVE YOU BEEN KICKED, HIT, SLAPPED, OR OTHERWISE PHYSICALLY HURT BY YOUR PARTNER OR EX-PARTNER?: NO

## 2021-01-27 SDOH — SOCIAL STABILITY: SOCIAL NETWORK: HOW OFTEN DO YOU ATTENT MEETINGS OF THE CLUB OR ORGANIZATION YOU BELONG TO?: NOT ASKED

## 2021-01-27 SDOH — SOCIAL STABILITY: SOCIAL NETWORK: IN A TYPICAL WEEK, HOW MANY TIMES DO YOU TALK ON THE PHONE WITH FAMILY, FRIENDS, OR NEIGHBORS?: NOT ASKED

## 2021-01-27 SDOH — SOCIAL STABILITY: SOCIAL INSECURITY: WITHIN THE LAST YEAR, HAVE YOU BEEN AFRAID OF YOUR PARTNER OR EX-PARTNER?: NO

## 2021-01-27 NOTE — CARE COORDINATION
Dr. Verna Altman met with the patient and her son. [facility] remains following for possible discharge plan. The LSW left a voice mail message for [facility], [facility].  Electronically signed by JACKLYN March on 1/27/21 at 4:37 PM EST

## 2021-01-27 NOTE — PROGRESS NOTES
Chief Complaint   Patient presents with    Shortness of Breath     sob last 3 days       SUBJECTIVE: Daughter-in-law is at the bedside. All questions were answered. Able to provide interpretation. Patient denies any chest pain. Does complain of shortness of breath. She complains of left flank pain. Zeng in place with good urine output. nsr in 80's on telemetry.   Hemodynamically stable.    -3.390 L total net fluid balance    Past Medical History:   Diagnosis Date    Ascending aortic aneurysm ([address] 6/23/2017    Charcot Arleen Tooth muscular atrophy     Depression     Descending aortic aneurysm ([address] 6/23/2017    Essential hypertension 2/16/2018    Headache     HTN (hypertension)     Impaired mobility and activities of daily living     Lumbar stenosis with neurogenic claudication     Myelopathy ([address]     Osteoarthritis      Patient Active Problem List   Diagnosis    Lumbar stenosis with neurogenic claudication    Acquired scoliosis    Osteoporosis    Charcot Arleen Tooth muscular atrophy    Excess weight    Osteoarthritis of lumbar spine with myelopathy    Osteoarthritis    Myelopathy ([address]    Impaired mobility and activities of daily living due to [employer], severe lumbar canal stenosis s/p Rt and Rui L3-4, L4-5 microdissection and decompression, [facility] admit 6/23/16    Headache    Depression    Ascending aortic aneurysm (HCC)    Descending aortic aneurysm (HCC)    Dizziness    Shortness of breath    Essential hypertension    Acute on chronic combined systolic and diastolic CHF (congestive heart failure) (HCC)    Gait abnormality       Current Facility-Administered Medications   Medication Dose Route Frequency Provider Last Rate Last Admin    enoxaparin (LOVENOX) injection 80 mg  1 mg/kg Subcutaneous Daily Wes J Holiday, DO   80 mg at 01/27/21 9017    aspirin chewable tablet 81 mg  81 mg Oral Daily Jorge J Holiday, DO   81 mg at 01/27/21 0907    citalopram (CELEXA) tablet 20 mg  20 mg Oral Daily Jorge J Holiday, DO   20 mg at 01/27/21 0321    dilTIAZem (CARDIZEM CD) extended release capsule 120 mg  120 mg Oral BID [facility] Holiday, DO   120 mg at 01/27/21 6563    hydrALAZINE (APRESOLINE) tablet 10 mg  10 mg Oral 3 times per day [facility] Holiday, DO   10 mg at 01/27/21 2504    potassium chloride (KLOR-CON M) extended release tablet 20 mEq  20 mEq Oral Daily with breakfast [facility] Holiday, DO   20 mEq at 01/27/21 1813    losartan (COZAAR) tablet 50 mg  50 mg Oral BID [facility] Holiday, DO   50 mg at 01/27/21 9883    levothyroxine (SYNTHROID) tablet 88 mcg  88 mcg Oral Daily [facility] Holiday, DO   88 mcg at 01/27/21 0538    furosemide (LASIX) injection 20 mg  20 mg Intravenous BID [facility] Holiday, DO   20 mg at 01/27/21 2577    LORazepam (ATIVAN) tablet 0.5 mg  0.5 mg Oral Q6H PRN [facility] Holiday, DO   0.5 mg at 01/26/21 1803    sodium chloride flush 0.9 % injection 10 mL  10 mL Intravenous ONCE PRN Sherlyn Motta MD        sodium chloride flush 0.9 % injection 10 mL  10 mL Intravenous 2 times per day [facility] Holiday, DO   10 mL at 01/27/21 0914    sodium chloride flush 0.9 % injection 10 mL  10 mL Intravenous PRN [facility] Holiday, DO        promethazine (PHENERGAN) tablet 12.5 mg  12.5 mg Oral Q6H PRN [facility] Holiday, DO        Or    ondansetron (ZOFRAN) injection 4 mg  4 mg Intravenous Q6H PRN [facility] Holiday, DO        acetaminophen (TYLENOL) tablet 650 mg  650 mg Oral Q6H PRN [facility] Holiday, DO        Or    acetaminophen (TYLENOL) suppository 650 mg  650 mg Rectal Q6H PRN [facility] Holiday, DO        polyethylene glycol (GLYCOLAX) packet 17 g  17 g Oral Daily PRN [facility] Holiday, DO           ALLERGIES: Latex and Tape [adhesive tape]      OBJECTIVE:     VITALS:  Blood pressure 128/77, pulse 82, temperature 97.5 °F (36.4 °C), temperature source Oral, resp. rate 18, height 5' 4\" (1.626 m), weight 155 lb 11.2 oz (70.6 kg), SpO2 95 %. Body mass index is 26.73 kg/m².     Physical Exam   Constitutional: She is oriented to person, place, and time. She appears well-developed and well-nourished. HENT:   Head: Normocephalic and atraumatic. Eyes: Pupils are equal, round, and reactive to light. Neck: Normal range of motion. Neck supple. No JVD present. No tracheal deviation present. No thyromegaly present. Cardiovascular: Normal rate, regular rhythm, normal heart sounds and intact distal pulses. PMI is not displaced. Exam reveals no gallop, no S3, no distant heart sounds and no friction rub. No murmur heard. Pulmonary/Chest: No respiratory distress. She has no wheezes. She has no rales. She exhibits no tenderness. Abdominal: Soft. Bowel sounds are normal. She exhibits no distension and no mass. There is no abdominal tenderness. There is no rebound and no guarding. Musculoskeletal:         General: No edema. Neurological: She is alert and oriented to person, place, and time. No cranial nerve deficit. Skin: Skin is warm and dry. No rash noted. She is not diaphoretic. No erythema. No pallor. Psychiatric: She has a normal mood and affect. Her behavior is normal. Judgment and thought content normal.         LABS:  Recent Results (from the past 24 hour(s))   COVID-19    Collection Time: 01/26/21  9:08 PM   Result Value Ref Range    SARS-CoV-2, NAAT Not Detected Not Detected     Troponin:    Lab Results   Component Value Date    TROPONINI <0.010 01/26/2021           ASSESSMENT:        Acute on chronic decompensated diastolic congestive heart failure. Questionable right ventricular failure. Likely pulmonary hypertension  Paroxysmal atrial fibrillation with rapid ventricle response-normal sinus rhythm now. History of coronary disease  History of SVT status post ablation  History of ascending and descending thoracic aortic aneurysm-stable  Essential hypertension           PLAN:   1. As always, aggressive risk factor modification is strongly recommended.  We should adhere to the JNC VIII guidelines for HTN management and the NCEPATP III guidelines for LDL-C management. 2. Continue with IV diuresis as navi, monitor I/O's, renal function, electrolytes. Keep K+>4 and Mg>2  3. Max cardiac meds  4. Full dose anticoagulation, convert to oral prior to discharge  5. Await 2D echocardiogram  6. Monitor on telemetry  7. Consider coronary evaluation in the future. Questionable stress test versus cardiac catheterization. 8. PT/OT  9. Leave Zeng in 1 more day. 10. Check UA CNS  11. Further recommendations to follow.     Electronically signed by Romaine Cloud DO on 1/27/2021 at 10:59 AM

## 2021-01-27 NOTE — PROGRESS NOTES
Subjective: The patient complains of ,\" moderate acute on chronic progressive fatigue and    partially relieved by rest,medications, Pt, OT, and rest and exacerbated by recent illness. I am concerned about patients medical complexities. ROS x10: The patient also complains of severely impaired mobility and activities of daily living. Otherwise no new problems with vision, hearing, nose, mouth, throat, dermal, cardiovascular, GI, , pulmonary, musculoskeletal, psychiatric or neurological. See Rehab H&P on Rehab chart dated . Vital signs:  BP (!) 79/54   Pulse 89   Temp 97.5 °F (36.4 °C) (Oral)   Resp 18   Ht 5' 4\" (1.626 m)   Wt 155 lb 11.2 oz (70.6 kg)   SpO2 94%   BMI 26.73 kg/m²   I/O:   PO/Intake:  fair PO intake, no problems observed or reported. Bowel/Bladder:  continent, no problems noted. General:  Patient is well developed, adequately nourished, non-obese and     well kempt. HEENT:    PERRLA, hearing intact to loud voice, external inspection of ear     and nose benign. Inspection of lips, tongue and gums benign  Musculoskeletal: No significant change in strength or tone. All joints stable. Inspection and palpation of digits and nails show no clubbing,       cyanosis or inflammatory conditions. Neuro/Psychiatric: Affect: flat but pleasant. Alert and oriented to person, place and     Situation with    cues. No significant change in deep tendon reflexes or     sensation  Lungs:  Diminished, CTA-B. Respiration effort is normal at rest.     Heart:   S1 = S2, RRR. No loud murmurs. Abdomen:  Soft, non-tender, no enlargement of liver or spleen. Extremities:  No significant lower extremity edema or tenderness. Skin:   Intact to general survey, no visualized or palpated problems.     Rehabilitation:  Physical therapy:   Bed Mobility:      Transfers: Sit to Stand: Minimal Assistance  Stand to sit: Minimal Assistance, Ambulation 1  Surface: level tile  Device: [address]: Minimal assistance  Quality of Gait: short step length, fair walker use and manuvering, bilat foot drop with decreased foot clearance a result  Distance: 25 feet x2 ; 15 feet  Comments: mod assist gait with no device 2 steps only due to significant LOB and not safe to test further,      FIMS:  ,  , Assessment: Pt demonstrates deficits as listed. She is requiring assistance for all mobility. Pt was previously indep.  Pt would benefit from PT at this time    Occupational therapy:    ,  ,      Speech therapy:        Lab/X-ray studies reviewed, analyzed and discussed with patient and staff:   Recent Results (from the past 24 hour(s))   COVID-19    Collection Time: 01/26/21  9:08 PM   Result Value Ref Range    SARS-CoV-2, NAAT Not Detected Not Detected   CBC    Collection Time: 01/27/21 12:01 PM   Result Value Ref Range    WBC 11.1 (H) 4.8 - 10.8 K/uL    RBC 4.18 (L) 4.20 - 5.40 M/uL    Hemoglobin 11.9 (L) 12.0 - 16.0 g/dL    Hematocrit 36.8 (L) 37.0 - 47.0 %    MCV 88.0 82.0 - 100.0 fL    MCH 28.5 27.0 - 31.3 pg    MCHC 32.4 (L) 33.0 - 37.0 %    RDW 14.0 11.5 - 14.5 %    Platelets 708 613 - 007 K/uL   Basic Metabolic Panel    Collection Time: 01/27/21 12:01 PM   Result Value Ref Range    Sodium 142 135 - 144 mEq/L    Potassium 3.8 3.4 - 4.9 mEq/L    Chloride 103 95 - 107 mEq/L    CO2 24 20 - 31 mEq/L    Anion Gap 15 9 - 15 mEq/L    Glucose 175 (H) 70 - 99 mg/dL    BUN 22 8 - 23 mg/dL    CREATININE 0.76 0.50 - 0.90 mg/dL    GFR Non-African American >60.0 >60    GFR  >60.0 >60    Calcium 8.8 8.5 - 9.9 mg/dL       Echo Complete 2d W Doppler W Color    Result Date: 1/27/2021  Transthoracic Echocardiography Report (TTE)  Demographics  Patient Name   Naz Ochoa        Gender              Female                 Iron Tristan  Patient Number [id number]          Race                                                   Ethnicity  Visit Number   [id number]         Room Number         O339 Corporate ID                     Date of Study       01/27/2021  Accession      [id number]        Referring Physician  Number  Date of Birth  07/12/1932        Sonographer         Nisa Mckenzie RCS  Age            80 year(s)        Interpreting        [facility])                                   Physician           Cardiology                                                       Amy Anders MD Procedure Type of Study  TTE procedure:ECHO COMPLETE 2D W/DOP W/COLOR. Procedure Date Date: 01/27/2021 Start: 10:26 AM Study Location: Portable Technical Quality: Adequate visualization Indications:Congestive heart failure. Patient Status: Routine Height: 64 inches Weight: 165 pounds BSA: 1.8 m^2 BMI: 28.32 kg/m^2 BP: 108/77 mmHg  Conclusions  Summary  Mitral annular calcification is present. 1+ MR  Left ventricular ejection fraction is visually estimated at 40%. E/A flow reversal noted. Suggestive of diastolic dysfunction. Signature  ----------------------------------------------------------------  Electronically signed by Amy Anders MD(Interpreting  physician) on 01/27/2021 11:37 AM  ----------------------------------------------------------------  Findings Left Ventricle Left ventricular ejection fraction is visually estimated at 40%. E/A flow reversal noted. Suggestive of diastolic dysfunction. Right Ventricle Normal right ventricle structure and function. Normal right ventricle systolic pressure. Left Atrium Moderately dilated left atrium. Right Atrium Moderately enlarged right atrium size. Mitral Valve Mitral annular calcification is present. 1+ MR Tricuspid Valve Normal tricuspid valve structure and function. 1-2+ TR RVSP 38 mmHg Aortic Valve Aortic valve leaflets are moderately thickened. No AS No AR Pulmonic Valve The pulmonic valve was not well visualized . Pericardial Effusion No evidence of pericardial effusion. Pleural Effusion No evidence of pleural effusion.  Aorta \ Miscellaneous The aorta is within normal 44.64 ml /25 m^2  Left Ventricle  Diastolic Dimension: 4.89 cm         Systolic Dimension: 1.16 cm  Septum Diastolic: 4.04 cm            Septum Systolic: 0.21 cm  PW Diastolic: 1.5 cm                 PW Systolic: 7.56 cm                                       FS: 19.1 %  LV EDV/LV EDV Index: 87.32 ml/49 m^2 LV ESV/LV ESV Index: 52.68 ml/29 m^2  EF Calculated: 39.7 %                LV Length: 6.12 cm  LVOT Diameter: 2.04 cm  Right Atrium  RA Systolic Pressure: 10 mmHg  Right Ventricle  Diastolic Dimension: 0.56 cm                                    RV Systolic Pressure: 01.10 mmHg Aorta/ Miscellaneous Aorta  Aortic Root: 3.62 cm  LVOT Diameter: 2.04 cm    Cta Chest W Wo Contrast    Result Date: 1/26/2021  EXAMINATION:  CHEST CTA WITH CONTRAST (PULMONARY EMBOLISM PROTOCOL) CLINICAL HISTORY:   PE   I50.43 Acute on chronic combined systolic and diastolic CHF (congestive heart failure) (HCC) ICD10. Technique:  Spiral axial CT acquisition of the chest from the thoracic inlet to the upper abdomen following IV contrast.  2-D images were reconstructed in the sagittal and coronal planes. 3-D MIPS images were generated in the coronal and axial planes. Images were reviewed on the PACS workstation. All images including the 3-D MIPS were personally archived. Contrast:  IV administration of 100 ml Isovue 300 All CT scans at this facility use dose modulation, iterative reconstruction, and/or weight based dosing when appropriate to reduce radiation dose to as low as reasonably achievable. Comparison:  CTA chest 3/15/2019  RESULT: Limitations: Motion artifact. Evaluation for thromboembolic disease:      - Right heart chambers:  No thromboembolic disease.      - Main pulmonary arteries:  No thromboembolic disease.      - Lobar pulmonary arteries:  No thromboembolic disease.        - Segmental pulmonary arteries:  No thromboembolic disease.        - Subsegmental pulmonary arteries:  Not well visualized due to motion.    Lines, tubes, and devices:  None. Lung parenchyma and pleura: Tortuous course of the trachea. Central airways appear grossly patent. Moderate left and small right pleural effusions with associated atelectasis. Limitations in evaluation of the lung parenchyma secondary to motion. Other areas of probable scarring/atelectasis. No pneumothorax. Thoracic inlet, heart, and mediastinum: Visualized thyroid unremarkable. No axillary, mediastinal, or hilar lymphadenopathy. Ascending thoracic aorta aneurysmal, measuring around 5.0 cm, similar to prior. Descending thoracic aorta aneurysmal and measures  up to 4.8 cm, also similar to prior. Normal pulmonary artery size. Cardiomegaly, similar to prior Scattered coronary artery calcifications. Small pericardial effusion. Small hiatal hernia. Bones and soft tissues:  No destructive bone lesion or acute osseous findings. Osteopenia. Scoliosis and kyphosis and multilevel degenerative changes, grossly similar to prior. Chest wall unremarkable. Upper abdomen:  No acute abnormality in the imaged upper abdomen. Reflux of contrast into the hepatic veins, associated with right heart failure. Lower neck: Unremarkable. No CT evidence of pulmonary embolism. Moderate left and small right pleural effusions. Cardiomegaly and small pericardial effusion. Reflux of contrast into the hepatic veins, associated with right heart failure. 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 No lung consolidative opacity. Areas of probable atelectasis/scarring. Xr Chest Portable    Result Date: 1/26/2021  EXAMINATION: Portable AP ERECT view of the chest. CLINICAL HISTORY:  SOB , Negative Covid-19 test. DATE: 1/25/2021 5:41 PM COMPARISONS: 7/24/2017 FINDINGS: The heart is moderately enlarged, similar to prior exam.  Prominent interstitial markings, consistent with increasing Central vascular congestion.  The costophrenic angles are blunted secondary to pleural effusions bilaterally, left greater than  right. No pneumothorax. There are infiltrates/atelectasis within lung bases, left greater than right. There are moderate degenerative changes in spine. CARDIAC ENLARGEMENT WITH CENTRAL VESSEL CONGESTION AND BILATERAL PLEURAL EFFUSIONS, POSSIBLE CONGESTIVE HEART FAILURE. BIBASILAR INFILTRATES/ATELECTASIS, LEFT GREATER THAN RIGHT. Previous extensive, complex labs, notes and diagnostics reviewed and analyzed. ALLERGIES:    Allergies as of 01/25/2021 - Review Complete 01/25/2021   Allergen Reaction Noted    Latex  07/19/2017    Tape [adhesive tape]  06/28/2016      (please also verify by checking [facility])     Complex Physical Medicine & Rehab Issues Assess & Plan:   1. Severe abnormality of gait and mobility and impaired self-care and ADL's secondary to progressive Cardiac rehab CHF exacerbation . Functional and medical status reassessed regarding patients ability to participate in therapies and patient found to be able to participate in acute intensive comprehensive inpatient rehabilitation program including PT/OT to improve balance, ambulation, ADLs, and to improve the P/AROM. Therapeutic modifications regarding activities in therapies, place, amount of time per day and intensity of therapy made daily. In bed therapies or bedside therapies prn.   2. Bowel and Bladder dysfunction  :  frequent toileting, ambulate to bathroom with assistance, check post void residuals. Check for C.difficile x1 if >2 loose stools in 24 hours, continue bowel & bladder program.  Monitor bowel and bladder function. Lactinex 2 PO every AC. MOM prn, Brown Bomb prn, Glycerin suppository prn, enema prn. 3. Moderate   pain as well as generalized OA pain: reassess pain every shift and prior to and after each therapy session, give prn Tylenol and   , modalities prn in therapy, masage, Lidoderm, K-pad prn. Consider scheduled AM pain meds.   4. Skin healing and breakdown risk:  continue pressure relief program.  Daily skin exams and reports from nursing. 5. Severe fatigue due to nutritional and hydration deficiency: Add and titrate vitamin B12 vitamin D and CoQ10 continue to monitor I&Os, calorie counts prn, dietary consult prn.  6. Acute episodic insomnia with situational adjustment disorder:  prn Ambien, monitor for day time sedation. 7. Falls risk elevated:  patient to use call light to get nursing assistance to get up, bed and chair alarm. 8. Elevated DVT risk: progressive activities in PT, continue prophylaxis PORTILLO hose, elevation and  see mar . 9. Complex discharge planning:  Weekly team meeting every Monday Thursday to assess progress towards goals, discuss and address social, psychological and medical comorbidities and to address difficulties they may be having progressing in therapy. Patient and family education is in progress. The patient is to follow-up with their family physician after discharge.         Complex Active General Medical Issues that complicate care Assess & Plan:    Patient Active Problem List   Diagnosis    Lumbar stenosis with neurogenic claudication    Acquired scoliosis    Osteoporosis    Charcot Arleen Tooth muscular atrophy    Excess weight    Osteoarthritis of lumbar spine with myelopathy    Osteoarthritis    Myelopathy ([address]    Impaired mobility and activities of daily living due to [employer], severe lumbar canal stenosis s/p Rt and Rui L3-4, L4-5 microdissection and decompression, [facility] admit 6/23/16    Headache    Depression    Ascending aortic aneurysm (HCC)    Descending aortic aneurysm (HCC)    Dizziness    Shortness of breath    Essential hypertension    Acute on chronic combined systolic and diastolic CHF (congestive heart failure) (HCC)    Gait abnormality   1. appropriate for rehab once medically cleared  Renetta Topete D.O., PM&R     Attending    Candance Parrot [address] [place] [address]

## 2021-01-27 NOTE — CONSULTS
Physical Medicine & Rehabilitation  Consult Note      Admitting Physician: Norm Thompson DO    Primary Care Provider: Gabby Farooq MD     Reason for Consult:  Asses rehab needs, promote physical and mental function, and decrease likelihood of re-admit to the hospital after discharge. History of Present Illness:    Keshia Monroe is a 80 y.o. female admitted to [facility] on 1/25/2021. CHF excerbation        HPI       Their inpatient work up has included:    Imaging:    Imaging and other studies reviewed and discussed with patient and staff    Echo Complete 2d W Doppler W Color    Result Date: 1/27/2021  Transthoracic Echocardiography Report (TTE)  Demographics  Patient Name   Naz Ochoa        Gender              Female                 Iron Tristan  Patient Number [id number]          Race                                                   Ethnicity  Visit Number   [id number]         Room Number         E771  Corporate ID                     Date of Study       01/27/2021  Accession      [id number]        Referring Physician  Number  Date of Birth  07/12/1932        Sonographer         Deanne STRONG  Age            80 year(s)        Interpreting        [facility])                                   Physician           Cardiology                                                       Anibal Rivas MD Procedure Type of Study  TTE procedure:ECHO COMPLETE 2D W/DOP W/COLOR. Procedure Date Date: 01/27/2021 Start: 10:26 AM Study Location: Portable Technical Quality: Adequate visualization Indications:Congestive heart failure. Patient Status: Routine Height: 64 inches Weight: 165 pounds BSA: 1.8 m^2 BMI: 28.32 kg/m^2 BP: 108/77 mmHg  Conclusions  Summary  Mitral annular calcification is present. 1+ MR  Left ventricular ejection fraction is visually estimated at 40%. E/A flow reversal noted. Suggestive of diastolic dysfunction.   Signature ----------------------------------------------------------------  Electronically signed by Kiesha Orozco MD(Interpreting  physician) on 01/27/2021 11:37 AM  ----------------------------------------------------------------  Findings Left Ventricle Left ventricular ejection fraction is visually estimated at 40%. E/A flow reversal noted. Suggestive of diastolic dysfunction. Right Ventricle Normal right ventricle structure and function. Normal right ventricle systolic pressure. Left Atrium Moderately dilated left atrium. Right Atrium Moderately enlarged right atrium size. Mitral Valve Mitral annular calcification is present. 1+ MR Tricuspid Valve Normal tricuspid valve structure and function. 1-2+ TR RVSP 38 mmHg Aortic Valve Aortic valve leaflets are moderately thickened. No AS No AR Pulmonic Valve The pulmonic valve was not well visualized . Pericardial Effusion No evidence of pericardial effusion. Pleural Effusion No evidence of pleural effusion. Aorta \ Miscellaneous The aorta is within normal limits. M-Mode Measurements (cm)  LVIDd: 4.39 cm                         LVIDs: 3.55 cm  IVSd: 1.43 cm                          IVSs: 1.55 cm  LVPWd: 1.5 cm                          LVPWs: 1.48 cm  Rt. Vent.  Dimension: 3.74 cm           AO Root Dimension: 3.62 cm                                         ACS: 1.25 cm                                         LA: 4.54 cm                                         LVOT: 2.04 cm Doppler Measurements:  AV Velocity:0.02 m/s                    MV Peak E-Wave: 0.85 m/s  AV Peak Gradient: 10.44 mmHg            MV Peak A-Wave: 0.87 m/s  AV Mean Gradient: 6.01 mmHg  AV Area (Continuity):1.83 cm^2  TR Velocity:2.62 m/s                    Estimated RAP:10 mmHg  TR Gradient:27.54 mmHg                  RVSP:37.54 mmHg Valves  Mitral Valve  Peak E-Wave: 0.85 m/s                 Peak A-Wave: 0.87 m/s                                        E/A Ratio: 0.97                                        Peak Gradient: 2.88 mmHg                                        Deceleration Time: 232.8 msec  Tissue Doppler  E' Septal Velocity: 0.07 m/s  E' Lateral Velocity: 0.08 m/s  Aortic Valve  Peak Velocity: 1.62 m/s                Mean Velocity: 1.15 m/s  Peak Gradient: 10.44 mmHg              Mean Gradient: 6.01 mmHg  Area (continuity): 1.83 cm^2           Area (2D): 1.8 cm^2  AV VTI: 28.8 cm  Cusp Separation: 1.25 cm  Tricuspid Valve  Estimated RVSP: 37.54 mmHg              Estimated RAP: 10 mmHg  TR Velocity: 2.62 m/s                   TR Gradient: 27.54 mmHg  Pulmonic Valve  Peak Velocity: 0.96 m/s           Peak Gradient: 3.66 mmHg                                    Estimated PASP: 37.54 mmHg  LVOT  Peak Velocity: 0.89 m/s              Mean Velocity: 0.57 m/s  Peak Gradient: 3.16 mmHg             Mean Gradient: 1.58 mmHg  LVOT Diameter: 2.04 cm               LVOT VTI: 16.15 cm Structures  Left Atrium  LA Dimension: 4.54 cm                        LA Area: 11.81 cm^2  LA/Aorta: 1.25  LA Volume/Index: 44.64 ml /25 m^2  Left Ventricle  Diastolic Dimension: 9.86 cm         Systolic Dimension: 5.91 cm  Septum Diastolic: 6.12 cm            Septum Systolic: 9.19 cm  PW Diastolic: 1.5 cm                 PW Systolic: 6.28 cm                                       FS: 19.1 %  LV EDV/LV EDV Index: 87.32 ml/49 m^2 LV ESV/LV ESV Index: 52.68 ml/29 m^2  EF Calculated: 39.7 %                LV Length: 6.12 cm  LVOT Diameter: 2.04 cm  Right Atrium  RA Systolic Pressure: 10 mmHg  Right Ventricle  Diastolic Dimension: 3.79 cm                                    RV Systolic Pressure: 90.67 mmHg Aorta/ Miscellaneous Aorta  Aortic Root: 3.62 cm  LVOT Diameter: 2.04 cm    Cta Chest W Wo Contrast    Result Date: 1/26/2021  EXAMINATION:  CHEST CTA WITH CONTRAST (PULMONARY EMBOLISM PROTOCOL) CLINICAL HISTORY:   PE   I50.43 Acute on chronic combined systolic and diastolic CHF (congestive heart failure) (HCC) ICD10.  Technique:  Spiral axial CT acquisition of the chest from the thoracic inlet to the upper abdomen following IV contrast.  2-D images were reconstructed in the sagittal and coronal planes. 3-D MIPS images were generated in the coronal and axial planes. Images were reviewed on the PACS workstation. All images including the 3-D MIPS were personally archived. Contrast:  IV administration of 100 ml Isovue 300 All CT scans at this facility use dose modulation, iterative reconstruction, and/or weight based dosing when appropriate to reduce radiation dose to as low as reasonably achievable. Comparison:  CTA chest 3/15/2019  RESULT: Limitations: Motion artifact. Evaluation for thromboembolic disease:      - Right heart chambers:  No thromboembolic disease.      - Main pulmonary arteries:  No thromboembolic disease.      - Lobar pulmonary arteries:  No thromboembolic disease.        - Segmental pulmonary arteries:  No thromboembolic disease.        - Subsegmental pulmonary arteries:  Not well visualized due to motion. Lines, tubes, and devices:  None. Lung parenchyma and pleura: Tortuous course of the trachea. Central airways appear grossly patent. Moderate left and small right pleural effusions with associated atelectasis. Limitations in evaluation of the lung parenchyma secondary to motion. Other areas of probable scarring/atelectasis. No pneumothorax. Thoracic inlet, heart, and mediastinum: Visualized thyroid unremarkable. No axillary, mediastinal, or hilar lymphadenopathy. Ascending thoracic aorta aneurysmal, measuring around 5.0 cm, similar to prior. Descending thoracic aorta aneurysmal and measures  up to 4.8 cm, also similar to prior. Normal pulmonary artery size. Cardiomegaly, similar to prior Scattered coronary artery calcifications. Small pericardial effusion. Small hiatal hernia. Bones and soft tissues:  No destructive bone lesion or acute osseous findings. Osteopenia.  Scoliosis and kyphosis and multilevel degenerative changes, grossly similar to prior. Chest wall unremarkable. Upper abdomen:  No acute abnormality in the imaged upper abdomen. Reflux of contrast into the hepatic veins, associated with right heart failure. Lower neck: Unremarkable. No CT evidence of pulmonary embolism. Moderate left and small right pleural effusions. Cardiomegaly and small pericardial effusion. Reflux of contrast into the hepatic veins, associated with right heart failure. 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 No lung consolidative opacity. Areas of probable atelectasis/scarring. Xr Chest Portable    Result Date: 1/26/2021  EXAMINATION: Portable AP ERECT view of the chest. CLINICAL HISTORY:  SOB , Negative Covid-19 test. DATE: 1/25/2021 5:41 PM COMPARISONS: 7/24/2017 FINDINGS: The heart is moderately enlarged, similar to prior exam.  Prominent interstitial markings, consistent with increasing Central vascular congestion. The costophrenic angles are blunted secondary to pleural effusions bilaterally, left greater than  right. No pneumothorax. There are infiltrates/atelectasis within lung bases, left greater than right. There are moderate degenerative changes in spine. CARDIAC ENLARGEMENT WITH CENTRAL VESSEL CONGESTION AND BILATERAL PLEURAL EFFUSIONS, POSSIBLE CONGESTIVE HEART FAILURE. BIBASILAR INFILTRATES/ATELECTASIS, LEFT GREATER THAN RIGHT.               Labs:     labs reviewed and discussed with patient and staff    No results found for: POCGLU  Lab Results   Component Value Date     01/27/2021    K 3.8 01/27/2021    K 4.1 01/25/2021     01/27/2021    CO2 24 01/27/2021    BUN 22 01/27/2021    CREATININE 0.76 01/27/2021    CALCIUM 8.8 01/27/2021    LABALBU 3.5 01/25/2021    BILITOT 0.4 01/25/2021    ALKPHOS 57 01/25/2021    AST 23 01/25/2021    ALT 20 01/25/2021     Lab Results   Component Value Date    WBC 11.1 01/27/2021    RBC 4.18 01/27/2021    HGB (01/26/21 1237)  UE Dressing: Minimal assistance (01/26/21 1237)  LE Dressing: Maximum assistance (01/26/21 1237)  Toileting: Unable to assess(comment) (01/26/21 1237)               LTG:                 Speech therapy: FIMS:          Past Medical History:          Diagnosis Date    Ascending aortic aneurysm ([facility] [address] 6/23/2017    Charcot Arleen Tooth muscular atrophy     Depression     Descending aortic aneurysm ([facility] [address] 6/23/2017    Essential hypertension 2/16/2018    Headache     HTN (hypertension)     Impaired mobility and activities of daily living     Lumbar stenosis with neurogenic claudication     Myelopathy ([facility] [address]     Osteoarthritis          PastSurgical History:          Procedure Laterality Date    JOINT REPLACEMENT           Allergies:      Allergies   Allergen Reactions    Latex     Tape Janet Sinks Tape]         CurrentMedications:     Current Facility-Administered Medications: carvedilol (COREG) tablet 3.125 mg, 3.125 mg, Oral, BID  enoxaparin (LOVENOX) injection 80 mg, 1 mg/kg, Subcutaneous, Daily  aspirin chewable tablet 81 mg, 81 mg, Oral, Daily  citalopram (CELEXA) tablet 20 mg, 20 mg, Oral, Daily  dilTIAZem (CARDIZEM CD) extended release capsule 120 mg, 120 mg, Oral, BID  hydrALAZINE (APRESOLINE) tablet 10 mg, 10 mg, Oral, 3 times per day  potassium chloride (KLOR-CON M) extended release tablet 20 mEq, 20 mEq, Oral, Daily with breakfast  losartan (COZAAR) tablet 50 mg, 50 mg, Oral, BID  levothyroxine (SYNTHROID) tablet 88 mcg, 88 mcg, Oral, Daily  furosemide (LASIX) injection 20 mg, 20 mg, Intravenous, BID  LORazepam (ATIVAN) tablet 0.5 mg, 0.5 mg, Oral, Q6H PRN  sodium chloride flush 0.9 % injection 10 mL, 10 mL, Intravenous, ONCE PRN  sodium chloride flush 0.9 % injection 10 mL, 10 mL, Intravenous, 2 times per day  sodium chloride flush 0.9 % injection 10 mL, 10 mL, Intravenous, PRN  promethazine (PHENERGAN) tablet 12.5 mg, 12.5 mg, Oral, Q6H PRN **OR** ondansetron (ZOFRAN) injection 4 mg, 4 rest.     Heart:   S1 = S2, RRR. No loud murmurs. Abdomen:  Soft, non-tender, no enlargement of liver or spleen. Extremities:  No significant lower extremity edema or tenderness. Skin:   Intact to general survey, no visualized or palpated problems. Rehabilitation:  Physical therapy:   Bed Mobility:      Transfers: Sit to Stand: Minimal Assistance  Stand to sit: Minimal Assistance, Ambulation 1  Surface: level tile  Device: Rolling Walker  Assistance: Minimal assistance  Quality of Gait: short step length, fair walker use and manuvering, bilat foot drop with decreased foot clearance a result  Distance: 25 feet x2 ; 15 feet  Comments: mod assist gait with no device 2 steps only due to significant LOB and not safe to test further,      FIMS:  ,  , Assessment: Pt demonstrates deficits as listed. She is requiring assistance for all mobility. Pt was previously indep.  Pt would benefit from PT at this time    Occupational therapy:    ,  ,      Speech therapy:            Diagnostics:    Recent Results (from the past 24 hour(s))   COVID-19    Collection Time: 01/26/21  9:08 PM   Result Value Ref Range    SARS-CoV-2, NAAT Not Detected Not Detected   CBC    Collection Time: 01/27/21 12:01 PM   Result Value Ref Range    WBC 11.1 (H) 4.8 - 10.8 K/uL    RBC 4.18 (L) 4.20 - 5.40 M/uL    Hemoglobin 11.9 (L) 12.0 - 16.0 g/dL    Hematocrit 36.8 (L) 37.0 - 47.0 %    MCV 88.0 82.0 - 100.0 fL    MCH 28.5 27.0 - 31.3 pg    MCHC 32.4 (L) 33.0 - 37.0 %    RDW 14.0 11.5 - 14.5 %    Platelets 421 486 - 511 K/uL   Basic Metabolic Panel    Collection Time: 01/27/21 12:01 PM   Result Value Ref Range    Sodium 142 135 - 144 mEq/L    Potassium 3.8 3.4 - 4.9 mEq/L    Chloride 103 95 - 107 mEq/L    CO2 24 20 - 31 mEq/L    Anion Gap 15 9 - 15 mEq/L    Glucose 175 (H) 70 - 99 mg/dL    BUN 22 8 - 23 mg/dL    CREATININE 0.76 0.50 - 0.90 mg/dL    GFR Non-African American >60.0 >60    GFR  >60.0 >60    Calcium 8.8 8.5 - 9.9 mg/dL Impression:    1. Impaired mobility and ADLs due to  Cardiac rehab CHF exacerbation   2. Fatigue and Malaise      Complex Active General Medical Issues thatcomplicate care Assess & Plan:     1. Active Problems:    Lumbar stenosis with neurogenic claudication    Charcot Arleen Tooth muscular atrophy    Depression    Essential hypertension    Acute on chronic combined systolic and diastolic CHF (congestive heart failure) (HCC)    Gait abnormality  Resolved Problems:    * No resolved hospital problems. *    Patient Active Problem List   Diagnosis    Lumbar stenosis with neurogenic claudication    Acquired scoliosis    Osteoporosis    Charcot Arleen Tooth muscular atrophy    Excess weight    Osteoarthritis of lumbar spine with myelopathy    Osteoarthritis    Myelopathy ([address]    Impaired mobility and activities of daily living due to [employer], severe lumbar canal stenosis s/p Rt and Rui L3-4, L4-5 microdissection and decompression, [facility] admit 6/23/16    Headache    Depression    Ascending aortic aneurysm (HCC)    Descending aortic aneurysm (HCC)    Dizziness    Shortness of breath    Essential hypertension    Acute on chronic combined systolic and diastolic CHF (congestive heart failure) ([facility]    Gait abnormality             Recommendations:    1. Considering all of the factors aboveincluding the patient's current medical status, social status/home envirnment, their functional needs and their ability to participate in a therapy program, I feel that they would best [facility]    [facility]. It is my opinion that they will be able to tolerate and benefit from 3 hours of therapy a day. I reviewed the varous local options re these levels of care with the patient and family. 2. Nursing care to focus on bowel and bladder issues. 3. Vitamin B12 shot times one  4.  Improve hydration and Nutrition by adding Proteinex and push PO fluids       It was my

## 2021-01-27 NOTE — CARE COORDINATION
Inpatient Rehab referral received. Met patient and son with PM&R Dr Shahzad Abdalla, explained [id number] [facility]patient Rehab program and that we would follow along for possible DC needs. Son stated patient has CMT and uses braces on her feet at home, was independent prior to admit but he does assist her when she lets him. Advised son to bring in patient's braces from home for working with therapy.  Electronically signed by Gerardo Petersen RN on 1/27/21 at 8:07 AM EST

## 2021-01-28 ENCOUNTER — APPOINTMENT (OUTPATIENT)
Dept: GENERAL RADIOLOGY | Age: 86
DRG: 287 | End: 2021-01-28
Payer: MEDICARE

## 2021-01-28 LAB
ANION GAP SERPL CALCULATED.3IONS-SCNC: 11 MEQ/L (ref 9–15)
BUN BLDV-MCNC: 23 MG/DL (ref 8–23)
CALCIUM SERPL-MCNC: 8.4 MG/DL (ref 8.5–9.9)
CHLORIDE BLD-SCNC: 105 MEQ/L (ref 95–107)
CO2: 26 MEQ/L (ref 20–31)
CREAT SERPL-MCNC: 0.64 MG/DL (ref 0.5–0.9)
EKG ATRIAL RATE: 306 BPM
EKG ATRIAL RATE: 77 BPM
EKG ATRIAL RATE: 82 BPM
EKG P AXIS: 53 DEGREES
EKG P AXIS: 62 DEGREES
EKG P-R INTERVAL: 206 MS
EKG P-R INTERVAL: 208 MS
EKG Q-T INTERVAL: 418 MS
EKG Q-T INTERVAL: 432 MS
EKG Q-T INTERVAL: 440 MS
EKG QRS DURATION: 130 MS
EKG QRS DURATION: 138 MS
EKG QRS DURATION: 144 MS
EKG QTC CALCULATION (BAZETT): 488 MS
EKG QTC CALCULATION (BAZETT): 497 MS
EKG QTC CALCULATION (BAZETT): 540 MS
EKG R AXIS: -64 DEGREES
EKG R AXIS: -67 DEGREES
EKG R AXIS: -68 DEGREES
EKG T AXIS: -61 DEGREES
EKG T AXIS: 23 DEGREES
EKG T AXIS: 63 DEGREES
EKG VENTRICULAR RATE: 77 BPM
EKG VENTRICULAR RATE: 82 BPM
EKG VENTRICULAR RATE: 94 BPM
GFR AFRICAN AMERICAN: >60
GFR NON-AFRICAN AMERICAN: >60
GLUCOSE BLD-MCNC: 104 MG/DL (ref 70–99)
HCT VFR BLD CALC: 32.7 % (ref 37–47)
HEMOGLOBIN: 10.9 G/DL (ref 12–16)
MCH RBC QN AUTO: 29.2 PG (ref 27–31.3)
MCHC RBC AUTO-ENTMCNC: 33.3 % (ref 33–37)
MCV RBC AUTO: 87.6 FL (ref 82–100)
PDW BLD-RTO: 13.8 % (ref 11.5–14.5)
PLATELET # BLD: 267 K/UL (ref 130–400)
POTASSIUM SERPL-SCNC: 3.4 MEQ/L (ref 3.4–4.9)
RBC # BLD: 3.73 M/UL (ref 4.2–5.4)
SODIUM BLD-SCNC: 142 MEQ/L (ref 135–144)
WBC # BLD: 10 K/UL (ref 4.8–10.8)

## 2021-01-28 PROCEDURE — 71045 X-RAY EXAM CHEST 1 VIEW: CPT

## 2021-01-28 PROCEDURE — 6360000002 HC RX W HCPCS: Performed by: INTERNAL MEDICINE

## 2021-01-28 PROCEDURE — 2700000000 HC OXYGEN THERAPY PER DAY

## 2021-01-28 PROCEDURE — 2580000003 HC RX 258: Performed by: INTERNAL MEDICINE

## 2021-01-28 PROCEDURE — 36415 COLL VENOUS BLD VENIPUNCTURE: CPT

## 2021-01-28 PROCEDURE — 93005 ELECTROCARDIOGRAM TRACING: CPT | Performed by: INTERNAL MEDICINE

## 2021-01-28 PROCEDURE — 2060000000 HC ICU INTERMEDIATE R&B

## 2021-01-28 PROCEDURE — 85027 COMPLETE CBC AUTOMATED: CPT

## 2021-01-28 PROCEDURE — 80048 BASIC METABOLIC PNL TOTAL CA: CPT

## 2021-01-28 PROCEDURE — 6370000000 HC RX 637 (ALT 250 FOR IP): Performed by: INTERNAL MEDICINE

## 2021-01-28 PROCEDURE — 99233 SBSQ HOSP IP/OBS HIGH 50: CPT | Performed by: INTERNAL MEDICINE

## 2021-01-28 RX ORDER — LOSARTAN POTASSIUM 50 MG/1
50 TABLET ORAL DAILY
Status: DISCONTINUED | OUTPATIENT
Start: 2021-01-29 | End: 2021-02-02 | Stop reason: HOSPADM

## 2021-01-28 RX ORDER — FUROSEMIDE 20 MG/1
20 TABLET ORAL DAILY
Status: DISCONTINUED | OUTPATIENT
Start: 2021-01-29 | End: 2021-02-02 | Stop reason: HOSPADM

## 2021-01-28 RX ADMIN — LEVOTHYROXINE SODIUM 88 MCG: 88 TABLET ORAL at 05:32

## 2021-01-28 RX ADMIN — CARVEDILOL 3.12 MG: 3.12 TABLET, FILM COATED ORAL at 22:24

## 2021-01-28 RX ADMIN — Medication 10 ML: at 09:48

## 2021-01-28 RX ADMIN — ASPIRIN 81 MG: 81 TABLET, CHEWABLE ORAL at 09:47

## 2021-01-28 RX ADMIN — Medication 10 ML: at 22:24

## 2021-01-28 RX ADMIN — CITALOPRAM HYDROBROMIDE 20 MG: 20 TABLET ORAL at 09:47

## 2021-01-28 RX ADMIN — HYDRALAZINE HYDROCHLORIDE 10 MG: 10 TABLET, FILM COATED ORAL at 05:32

## 2021-01-28 RX ADMIN — ENOXAPARIN SODIUM 80 MG: 80 INJECTION SUBCUTANEOUS at 09:47

## 2021-01-28 RX ADMIN — DILTIAZEM HYDROCHLORIDE 120 MG: 120 CAPSULE, COATED, EXTENDED RELEASE ORAL at 22:24

## 2021-01-28 RX ADMIN — POTASSIUM CHLORIDE 20 MEQ: 20 TABLET, EXTENDED RELEASE ORAL at 09:47

## 2021-01-28 ASSESSMENT — PAIN SCALES - GENERAL: PAINLEVEL_OUTOF10: 0

## 2021-01-28 NOTE — CARE COORDINATION
The LSW talked to [facility]. [facility] remains following the patient.  Electronically signed by JACKLYN Yu on 1/28/21 at 3:46 PM EST

## 2021-01-28 NOTE — PROGRESS NOTES
Nutrition Education    · Referral for CHF education, multiple attempts to educate today, pt sleeping, Handout left at bedisde with RDN  name and phone number will attempt visit prior to d/c  · Written educational materials provided. · Contact name and number provided.       Electronically signed by Sarwat Durant RD, LD on 1/28/21 at 11:51 AM EST

## 2021-01-28 NOTE — PROGRESS NOTES
Physical Therapy Missed Treatment   Facility/Department: [facility] MED SURG [id number]    NAME: Jose L Sepulveda    : 1932 (80 y.o.)  MRN: [id number]    Account: [de-identified]  Gender: female    Chart reviewed, attempted PT at 1000. Patient unavailable 2° to:      [x] Pt declined    [x] Nsg notified   [] Other notified     Pt refused therapy and guest in room stated they spoke to  yesterday about being took off of therapy. Spoke with nurse and nurse stated that pt can not get out of bed due to BP issues and that she is supposed to stay on therapy. Will attempt PT treatment again at earliest convenience.       Electronically signed by Everardo Norton PTA on 21 at 10:01 AM EST

## 2021-01-28 NOTE — PROGRESS NOTES
Physical Therapy Missed Treatment   Facility/Department: [facility] MED SURG [id number]    NAME: Onel Ott    : 1932 (80 y.o.)  MRN: [id number]    Account: [de-identified]  Gender: female    Chart reviewed, attempted PT at 11:50. Patient unavailable 2° to:    [x] Hold per nsg request    Low BP    Will attempt PT treatment again at earliest convenience.       Electronically signed by Santi Daniels PTA on 21 at 11:53 AM EST

## 2021-01-28 NOTE — PROGRESS NOTES
Subjective: The patient complains of ,\" moderate acute on chronic progressive fatigue and    partially relieved by rest,medications, Pt, OT, and rest and exacerbated by recent illness. I am concerned about patients medical complexities. Low BPs couldnot tolerate therapy. ROS x10: The patient also complains of severely impaired mobility and activities of daily living. Otherwise no new problems with vision, hearing, nose, mouth, throat, dermal, cardiovascular, GI, , pulmonary, musculoskeletal, psychiatric or neurological. See Rehab H&P on Rehab chart dated . Vital signs:  BP (!) 80/51   Pulse 77   Temp 97.9 °F (36.6 °C) (Oral)   Resp 18   Ht 5' 4\" (1.626 m)   Wt 160 lb 3.2 oz (72.7 kg)   SpO2 95%   BMI 27.50 kg/m²   I/O:   PO/Intake:  fair PO intake, no problems observed or reported. Bowel/Bladder:  continent, no problems noted. General:  Patient is well developed, adequately nourished, non-obese and     well kempt. HEENT:    PERRLA, hearing intact to loud voice, external inspection of ear     and nose benign. Inspection of lips, tongue and gums benign  Musculoskeletal: No significant change in strength or tone. All joints stable. Inspection and palpation of digits and nails show no clubbing,       cyanosis or inflammatory conditions. Neuro/Psychiatric: Affect: flat but pleasant. Alert and oriented to person, place and     Situation with    cues. No significant change in deep tendon reflexes or     sensation  Lungs:  Diminished, CTA-B. Respiration effort is normal at rest.     Heart:   S1 = S2, RRR. No loud murmurs. Abdomen:  Soft, non-tender, no enlargement of liver or spleen. Extremities:  No significant lower extremity edema or tenderness. Skin:   Intact to general survey, no visualized or palpated problems.     Rehabilitation:  Physical therapy:   Bed Mobility:      Transfers: Sit to Stand: Minimal Assistance  Stand to sit: Minimal Assistance, Ambulation 1  Surface: level tile  Device: Rolling Walker  Assistance: Minimal assistance  Quality of Gait: short step length, fair walker use and manuvering, bilat foot drop with decreased foot clearance a result  Distance: 25 feet x2 ; 15 feet  Comments: mod assist gait with no device 2 steps only due to significant LOB and not safe to test further,      FIMS:  ,  , Assessment: Pt demonstrates deficits as listed. She is requiring assistance for all mobility. Pt was previously indep.  Pt would benefit from PT at this time    Occupational therapy:    ,  ,      Speech therapy:        Lab/X-ray studies reviewed, analyzed and discussed with patient and staff:   Recent Results (from the past 24 hour(s))   URINE RT REFLEX TO CULTURE    Collection Time: 01/27/21  7:03 PM    Specimen: Urine, clean catch   Result Value Ref Range    Color, UA Yellow Straw/Yellow    Clarity, UA Clear Clear    Glucose, Ur Negative Negative mg/dL    Bilirubin Urine Negative Negative    Ketones, Urine Negative Negative mg/dL    Specific Gravity, UA 1.016 1.005 - 1.030    Blood, Urine LARGE (A) Negative    pH, UA 5.0 5.0 - 9.0    Protein, UA TRACE (A) Negative mg/dL    Urobilinogen, Urine 0.2 <2.0 E.U./dL    Nitrite, Urine Negative Negative    Leukocyte Esterase, Urine Negative Negative    Urine Reflex to Culture Not Indicated    Microscopic Urinalysis    Collection Time: 01/27/21  7:03 PM   Result Value Ref Range    Bacteria, UA Negative Negative /HPF    Hyaline Casts, UA 0-1 0 - 5 /HPF    WBC, UA 0-2 0 - 5 /HPF    RBC, UA >100 (H) 0 - 5 /HPF    Epithelial Cells, UA 0-2 0 - 5 /HPF   CBC    Collection Time: 01/28/21  5:16 AM   Result Value Ref Range    WBC 10.0 4.8 - 10.8 K/uL    RBC 3.73 (L) 4.20 - 5.40 M/uL    Hemoglobin 10.9 (L) 12.0 - 16.0 g/dL    Hematocrit 32.7 (L) 37.0 - 47.0 %    MCV 87.6 82.0 - 100.0 fL    MCH 29.2 27.0 - 31.3 pg    MCHC 33.3 33.0 - 37.0 %    RDW 13.8 11.5 - 14.5 %    Platelets 232 558 - 690 K/uL   Basic Metabolic Panel Collection Time: 01/28/21  5:16 AM   Result Value Ref Range    Sodium 142 135 - 144 mEq/L    Potassium 3.4 3.4 - 4.9 mEq/L    Chloride 105 95 - 107 mEq/L    CO2 26 20 - 31 mEq/L    Anion Gap 11 9 - 15 mEq/L    Glucose 104 (H) 70 - 99 mg/dL    BUN 23 8 - 23 mg/dL    CREATININE 0.64 0.50 - 0.90 mg/dL    GFR Non-African American >60.0 >60    GFR  >60.0 >60    Calcium 8.4 (L) 8.5 - 9.9 mg/dL       Echo Complete 2d W Doppler W Color    Result Date: 1/27/2021  Transthoracic Echocardiography Report (TTE)  Demographics  Patient Name   Fernandez Montes        Gender              Female                 Miriam Osei  Patient Number [id number]          Race                                                   Ethnicity  Visit Number   [id number]         Room Number         T459  Corporate ID                     Date of Study       01/27/2021  Accession      [id number]        Referring Physician  Number  Date of Birth  07/12/1932        Sonographer         Maria Isabel STRONG  Age            80 year(s)        Interpreting        [address] [place] [address]           Cardiology                                                       Marivel Almanzar MD Procedure Type of Study  TTE procedure:ECHO COMPLETE 2D W/DOP W/COLOR. Procedure Date Date: 01/27/2021 Start: 10:26 AM Study Location: Portable Technical Quality: Adequate visualization Indications:Congestive heart failure. Patient Status: Routine Height: 64 inches Weight: 165 pounds BSA: 1.8 m^2 BMI: 28.32 kg/m^2 BP: 108/77 mmHg  Conclusions  Summary  Mitral annular calcification is present. 1+ MR  Left ventricular ejection fraction is visually estimated at 40%. E/A flow reversal noted. Suggestive of diastolic dysfunction.   Signature  ----------------------------------------------------------------  Electronically signed by Marivel Almanzar MD(Interpreting  physician) on 01/27/2021 11:37 AM  ---------------------------------------------------------------- Velocity: 1.15 m/s  Peak Gradient: 10.44 mmHg              Mean Gradient: 6.01 mmHg  Area (continuity): 1.83 cm^2           Area (2D): 1.8 cm^2  AV VTI: 28.8 cm  Cusp Separation: 1.25 cm  Tricuspid Valve  Estimated RVSP: 37.54 mmHg              Estimated RAP: 10 mmHg  TR Velocity: 2.62 m/s                   TR Gradient: 27.54 mmHg  Pulmonic Valve  Peak Velocity: 0.96 m/s           Peak Gradient: 3.66 mmHg                                    Estimated PASP: 37.54 mmHg  LVOT  Peak Velocity: 0.89 m/s              Mean Velocity: 0.57 m/s  Peak Gradient: 3.16 mmHg             Mean Gradient: 1.58 mmHg  LVOT Diameter: 2.04 cm               LVOT VTI: 16.15 cm Structures  Left Atrium  LA Dimension: 4.54 cm                        LA Area: 11.81 cm^2  LA/Aorta: 1.25  LA Volume/Index: 44.64 ml /25 m^2  Left Ventricle  Diastolic Dimension: 0.22 cm         Systolic Dimension: 8.63 cm  Septum Diastolic: 8.37 cm            Septum Systolic: 6.93 cm  PW Diastolic: 1.5 cm                 PW Systolic: 3.18 cm                                       FS: 19.1 %  LV EDV/LV EDV Index: 87.32 ml/49 m^2 LV ESV/LV ESV Index: 52.68 ml/29 m^2  EF Calculated: 39.7 %                LV Length: 6.12 cm  LVOT Diameter: 2.04 cm  Right Atrium  RA Systolic Pressure: 10 mmHg  Right Ventricle  Diastolic Dimension: 5.50 cm                                    RV Systolic Pressure: 93.70 mmHg Aorta/ Miscellaneous Aorta  Aortic Root: 3.62 cm  LVOT Diameter: 2.04 cm    Cta Chest W Wo Contrast    Result Date: 1/26/2021  EXAMINATION:  CHEST CTA WITH CONTRAST (PULMONARY EMBOLISM PROTOCOL) CLINICAL HISTORY:   PE   I50.43 Acute on chronic combined systolic and diastolic CHF (congestive heart failure) (HCC) ICD10. Technique:  Spiral axial CT acquisition of the chest from the thoracic inlet to the upper abdomen following IV contrast.  2-D images were reconstructed in the sagittal and coronal planes. 3-D MIPS images were generated in the coronal and axial planes.  Images were reviewed on the PACS workstation. All images including the 3-D MIPS were personally archived. Contrast:  IV administration of 100 ml Isovue 300 All CT scans at this facility use dose modulation, iterative reconstruction, and/or weight based dosing when appropriate to reduce radiation dose to as low as reasonably achievable. Comparison:  CTA chest 3/15/2019  RESULT: Limitations: Motion artifact. Evaluation for thromboembolic disease:      - Right heart chambers:  No thromboembolic disease.      - Main pulmonary arteries:  No thromboembolic disease.      - Lobar pulmonary arteries:  No thromboembolic disease.        - Segmental pulmonary arteries:  No thromboembolic disease.        - Subsegmental pulmonary arteries:  Not well visualized due to motion. Lines, tubes, and devices:  None. Lung parenchyma and pleura: Tortuous course of the trachea. Central airways appear grossly patent. Moderate left and small right pleural effusions with associated atelectasis. Limitations in evaluation of the lung parenchyma secondary to motion. Other areas of probable scarring/atelectasis. No pneumothorax. Thoracic inlet, heart, and mediastinum: Visualized thyroid unremarkable. No axillary, mediastinal, or hilar lymphadenopathy. Ascending thoracic aorta aneurysmal, measuring around 5.0 cm, similar to prior. Descending thoracic aorta aneurysmal and measures  up to 4.8 cm, also similar to prior. Normal pulmonary artery size. Cardiomegaly, similar to prior Scattered coronary artery calcifications. Small pericardial effusion. Small hiatal hernia. Bones and soft tissues:  No destructive bone lesion or acute osseous findings. Osteopenia. Scoliosis and kyphosis and multilevel degenerative changes, grossly similar to prior. Chest wall unremarkable. Upper abdomen:  No acute abnormality in the imaged upper abdomen. Reflux of contrast into the hepatic veins, associated with right heart failure. Lower neck: Unremarkable.      No CT evidence of pulmonary embolism. Moderate left and small right pleural effusions. Cardiomegaly and small pericardial effusion. Reflux of contrast into the hepatic veins, associated with right heart failure. 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 No lung consolidative opacity. Areas of probable atelectasis/scarring. Xr Chest Portable    Result Date: 1/26/2021  EXAMINATION: Portable AP ERECT view of the chest. CLINICAL HISTORY:  SOB , Negative Covid-19 test. DATE: 1/25/2021 5:41 PM COMPARISONS: 7/24/2017 FINDINGS: The heart is moderately enlarged, similar to prior exam.  Prominent interstitial markings, consistent with increasing Central vascular congestion. The costophrenic angles are blunted secondary to pleural effusions bilaterally, left greater than  right. No pneumothorax. There are infiltrates/atelectasis within lung bases, left greater than right. There are moderate degenerative changes in spine. CARDIAC ENLARGEMENT WITH CENTRAL VESSEL CONGESTION AND BILATERAL PLEURAL EFFUSIONS, POSSIBLE CONGESTIVE HEART FAILURE. BIBASILAR INFILTRATES/ATELECTASIS, LEFT GREATER THAN RIGHT. Previous extensive, complex labs, notes and diagnostics reviewed and analyzed. ALLERGIES:    Allergies as of 01/25/2021 - Review Complete 01/25/2021   Allergen Reaction Noted    Latex  07/19/2017    Tape [adhesive tape]  06/28/2016      (please also verify by checking [facility])     Complex Physical Medicine & Rehab Issues Assess & Plan:   1. Severe abnormality of gait and mobility and impaired self-care and ADL's secondary to progressive Cardiac rehab CHF exacerbation .   Functional and medical status reassessed regarding patients ability to participate in therapies and patient found to be able to participate in acute intensive comprehensive inpatient rehabilitation program including PT/OT to improve balance, ambulation, ADLs, and to improve the P/AROM. Therapeutic modifications regarding activities in therapies, place, amount of time per day and intensity of therapy made daily. In bed therapies or bedside therapies prn.   2. Bowel and Bladder dysfunction  :  frequent toileting, ambulate to bathroom with assistance, check post void residuals. Check for C.difficile x1 if >2 loose stools in 24 hours, continue bowel & bladder program.  Monitor bowel and bladder function. Lactinex 2 PO every AC. MOM prn, Brown Bomb prn, Glycerin suppository prn, enema prn. 3. Moderate   pain as well as generalized OA pain: reassess pain every shift and prior to and after each therapy session, give prn Tylenol and   , modalities prn in therapy, masage, Lidoderm, K-pad prn. Consider scheduled AM pain meds. 4. Skin healing and breakdown risk:  continue pressure relief program.  Daily skin exams and reports from nursing. 5. Severe fatigue due to nutritional and hydration deficiency: Add and titrate vitamin B12 vitamin D and CoQ10 continue to monitor I&Os, calorie counts prn, dietary consult prn.  6. Acute episodic insomnia with situational adjustment disorder:  prn Ambien, monitor for day time sedation. 7. Falls risk elevated:  patient to use call light to get nursing assistance to get up, bed and chair alarm. 8. Elevated DVT risk: progressive activities in PT, continue prophylaxis PORTILLO hose, elevation and  see mar . 9. Complex discharge planning:  Weekly team meeting every Monday Thursday to assess progress towards goals, discuss and address social, psychological and medical comorbidities and to address difficulties they may be having progressing in therapy. Patient and family education is in progress. The patient is to follow-up with their family physician after discharge.         Complex Active General Medical Issues that complicate care Assess & Plan:    Patient Active Problem List   Diagnosis    Lumbar stenosis with neurogenic claudication    Acquired scoliosis  Osteoporosis    Charcot Arleen Tooth muscular atrophy    Excess weight    Osteoarthritis of lumbar spine with myelopathy    Osteoarthritis    Myelopathy (HCC)    Impaired mobility and activities of daily living due to [employer], severe lumbar canal stenosis s/p Rt and Rui L3-4, L4-5 microdissection and decompression, [facility] admit 6/23/16    Headache    Depression    Ascending aortic aneurysm (HCC)    Descending aortic aneurysm (HCC)    Dizziness    Shortness of breath    Essential hypertension    Acute on chronic combined systolic and diastolic CHF (congestive heart failure) (HCC)    Gait abnormality   1. appropriate for rehab once medically cleared  Shahzad Washington D.O., PM&R     Attending    [address] [facility]

## 2021-01-28 NOTE — PROGRESS NOTES
abnormality       Current Facility-Administered Medications   Medication Dose Route Frequency Provider Last Rate Last Admin    [START ON 1/29/2021] losartan (COZAAR) tablet 50 mg  50 mg Oral Daily Jorge J Holiday, DO        carvedilol (COREG) tablet 3.125 mg  3.125 mg Oral BID Jorge  Holiday, DO   3.125 mg at 01/27/21 2002    enoxaparin (LOVENOX) injection 80 mg  1 mg/kg Subcutaneous Daily Jorge  Holiday, DO   80 mg at 01/28/21 4940    aspirin chewable tablet 81 mg  81 mg Oral Daily Jorge  Holiday, DO   81 mg at 01/28/21 0947    citalopram (CELEXA) tablet 20 mg  20 mg Oral Daily Wes J Holiday, DO   20 mg at 01/28/21 4556    dilTIAZem (CARDIZEM CD) extended release capsule 120 mg  120 mg Oral BID [facility] Holiday, DO   120 mg at 01/27/21 2002    potassium chloride (KLOR-CON M) extended release tablet 20 mEq  20 mEq Oral Daily with breakfast Jorge  Holiday, DO   20 mEq at 01/28/21 0947    levothyroxine (SYNTHROID) tablet 88 mcg  88 mcg Oral Daily Jorge  Holiday, DO   88 mcg at 01/28/21 0532    furosemide (LASIX) injection 20 mg  20 mg Intravenous BID Jorge  Holiday, DO   Stopped at 01/28/21 0932    LORazepam (ATIVAN) tablet 0.5 mg  0.5 mg Oral Q6H PRN [facility] Holiday, DO   0.5 mg at 01/26/21 1803    sodium chloride flush 0.9 % injection 10 mL  10 mL Intravenous ONCE PRN Sammy Hidalgo MD        sodium chloride flush 0.9 % injection 10 mL  10 mL Intravenous 2 times per day [facility] Holiday, DO   10 mL at 01/28/21 0948    sodium chloride flush 0.9 % injection 10 mL  10 mL Intravenous PRN Jorge JAYA Holiday, DO        promethazine (PHENERGAN) tablet 12.5 mg  12.5 mg Oral Q6H PRN Jorge JAYA Grimesiday, DO        Or    ondansetron (ZOFRAN) injection 4 mg  4 mg Intravenous Q6H PRN Jorge JAYA Holiday, DO        acetaminophen (TYLENOL) tablet 650 mg  650 mg Oral Q6H PRN Jorge JAYA Grimesiday, DO        Or    acetaminophen (TYLENOL) suppository 650 mg  650 mg Rectal Q6H PRN Jorge Franco,         polyethylene glycol (GLYCOLAX) packet 17 g  17 g Oral Daily PRN Jorge J Holiday, DO           ALLERGIES: Latex and Tape [adhesive tape]      OBJECTIVE:     VITALS:  Blood pressure (!) 80/51, pulse 77, temperature 97.9 °F (36.6 °C), temperature source Oral, resp. rate 18, height 5' 4\" (1.626 m), weight 160 lb 3.2 oz (72.7 kg), SpO2 95 %. Body mass index is 27.5 kg/m². Physical Exam   Constitutional: She is oriented to person, place, and time. She appears well-developed and well-nourished. HENT:   Head: Normocephalic and atraumatic. Eyes: Pupils are equal, round, and reactive to light. Neck: Normal range of motion. Neck supple. No JVD present. No tracheal deviation present. No thyromegaly present. Cardiovascular: Normal rate, regular rhythm, normal heart sounds and intact distal pulses. PMI is not displaced. Exam reveals no gallop, no S3, no distant heart sounds and no friction rub. No murmur heard. Pulmonary/Chest: No respiratory distress. She has no wheezes. She has no rales. She exhibits no tenderness. Abdominal: Soft. Bowel sounds are normal. She exhibits no distension and no mass. There is no abdominal tenderness. There is no rebound and no guarding. Musculoskeletal:         General: No edema. Neurological: She is alert and oriented to person, place, and time. No cranial nerve deficit. Skin: Skin is warm and dry. No rash noted. She is not diaphoretic. No erythema. No pallor. Psychiatric: She has a normal mood and affect.  Her behavior is normal. Judgment and thought content normal.         LABS:  Recent Results (from the past 24 hour(s))   URINE RT REFLEX TO CULTURE    Collection Time: 01/27/21  7:03 PM    Specimen: Urine, clean catch   Result Value Ref Range    Color, UA Yellow Straw/Yellow    Clarity, UA Clear Clear    Glucose, Ur Negative Negative mg/dL    Bilirubin Urine Negative Negative    Ketones, Urine Negative Negative mg/dL    Specific Gravity, UA 1.016 1.005 - 1.030    Blood, Urine LARGE (A) Negative    pH, UA 5.0 5.0 - 9.0 Protein, UA TRACE (A) Negative mg/dL    Urobilinogen, Urine 0.2 <2.0 E.U./dL    Nitrite, Urine Negative Negative    Leukocyte Esterase, Urine Negative Negative    Urine Reflex to Culture Not Indicated    Microscopic Urinalysis    Collection Time: 01/27/21  7:03 PM   Result Value Ref Range    Bacteria, UA Negative Negative /HPF    Hyaline Casts, UA 0-1 0 - 5 /HPF    WBC, UA 0-2 0 - 5 /HPF    RBC, UA >100 (H) 0 - 5 /HPF    Epithelial Cells, UA 0-2 0 - 5 /HPF   CBC    Collection Time: 01/28/21  5:16 AM   Result Value Ref Range    WBC 10.0 4.8 - 10.8 K/uL    RBC 3.73 (L) 4.20 - 5.40 M/uL    Hemoglobin 10.9 (L) 12.0 - 16.0 g/dL    Hematocrit 32.7 (L) 37.0 - 47.0 %    MCV 87.6 82.0 - 100.0 fL    MCH 29.2 27.0 - 31.3 pg    MCHC 33.3 33.0 - 37.0 %    RDW 13.8 11.5 - 14.5 %    Platelets 531 760 - 632 K/uL   Basic Metabolic Panel    Collection Time: 01/28/21  5:16 AM   Result Value Ref Range    Sodium 142 135 - 144 mEq/L    Potassium 3.4 3.4 - 4.9 mEq/L    Chloride 105 95 - 107 mEq/L    CO2 26 20 - 31 mEq/L    Anion Gap 11 9 - 15 mEq/L    Glucose 104 (H) 70 - 99 mg/dL    BUN 23 8 - 23 mg/dL    CREATININE 0.64 0.50 - 0.90 mg/dL    GFR Non-African American >60.0 >60    GFR  >60.0 >60    Calcium 8.4 (L) 8.5 - 9.9 mg/dL     Troponin:    Lab Results   Component Value Date    TROPONINI <0.010 01/26/2021           ASSESSMENT:        Acute on chronic decompensated diastolic congestive heart failure. Questionable right ventricular failure. Likely pulmonary hypertension  Paroxysmal atrial fibrillation with rapid ventricle response-normal sinus rhythm now. History of coronary disease  History of SVT status post ablation  History of ascending and descending thoracic aortic aneurysm-stable  Essential hypertension           PLAN:   1. As always, aggressive risk factor modification is strongly recommended.  We should adhere to the JNC VIII guidelines for HTN management and the NCEPATP III guidelines for LDL-C management. 2. Change Lasix to p.o.  3. Max cardiac meds  4. Full dose anticoagulation, convert to oral prior to discharge  5. DC hydralazine p.o. 6. Lower losartan to 50 mg once daily. 7. Monitor on telemetry  8. Cardiac catheterization in near future given new onset cardiomyopathy ejection fraction of 40%. Will discuss with patient and family. Questionable inpatient versus outpatient. 9. PT/OT  10. Discontinue Zeng catheter  11. Check chest x-ray  12. Further recommendations to follow.     Electronically signed by Nadeem Sanders DO on 1/28/2021 at 1:51 PM

## 2021-01-29 ENCOUNTER — APPOINTMENT (OUTPATIENT)
Dept: INTERVENTIONAL RADIOLOGY/VASCULAR | Age: 86
DRG: 287 | End: 2021-01-29
Payer: MEDICARE

## 2021-01-29 ENCOUNTER — APPOINTMENT (OUTPATIENT)
Dept: GENERAL RADIOLOGY | Age: 86
DRG: 287 | End: 2021-01-29
Payer: MEDICARE

## 2021-01-29 LAB
ALBUMIN FLUID: 2.5 G/DL
AMYLASE FLUID: 23 U/L
ANION GAP SERPL CALCULATED.3IONS-SCNC: 10 MEQ/L (ref 9–15)
APPEARANCE FLUID: NORMAL
BUN BLDV-MCNC: 22 MG/DL (ref 8–23)
CALCIUM SERPL-MCNC: 8.5 MG/DL (ref 8.5–9.9)
CELL COUNT FLUID TYPE: NORMAL
CHLORIDE BLD-SCNC: 108 MEQ/L (ref 95–107)
CLOT EVALUATION: NORMAL
CO2: 26 MEQ/L (ref 20–31)
COLOR FLUID: YELLOW
CREAT SERPL-MCNC: 0.56 MG/DL (ref 0.5–0.9)
FLUID PATH CONSULT: YES
FLUID TYPE: NORMAL
GFR AFRICAN AMERICAN: >60
GFR NON-AFRICAN AMERICAN: >60
GLUCOSE BLD-MCNC: 100 MG/DL (ref 70–99)
GLUCOSE, FLUID: 110.2 MG/DL
HCT VFR BLD CALC: 35.4 % (ref 37–47)
HEMOGLOBIN: 11.4 G/DL (ref 12–16)
LD, FLUID: 141 U/L
LYMPHOCYTES, BODY FLUID: 64 %
MCH RBC QN AUTO: 28.7 PG (ref 27–31.3)
MCHC RBC AUTO-ENTMCNC: 32.1 % (ref 33–37)
MCV RBC AUTO: 89.3 FL (ref 82–100)
MESOTHELIAL FLUID: PRESENT %
MONOCYTE, FLUID: 14 %
NEUTROPHIL, FLUID: 22 %
NUCLEATED CELLS FLUID: 2071 /CUMM
NUMBER OF CELLS COUNTED FLUID: 100
PDW BLD-RTO: 14.3 % (ref 11.5–14.5)
PLATELET # BLD: 263 K/UL (ref 130–400)
POTASSIUM SERPL-SCNC: 3.5 MEQ/L (ref 3.4–4.9)
PROTEIN FLUID: 4.5 G/DL
RBC # BLD: 3.96 M/UL (ref 4.2–5.4)
RBC FLUID: 8678 /CUMM
SODIUM BLD-SCNC: 144 MEQ/L (ref 135–144)
WBC # BLD: 9.2 K/UL (ref 4.8–10.8)

## 2021-01-29 PROCEDURE — 2580000003 HC RX 258: Performed by: INTERNAL MEDICINE

## 2021-01-29 PROCEDURE — 99223 1ST HOSP IP/OBS HIGH 75: CPT | Performed by: INTERNAL MEDICINE

## 2021-01-29 PROCEDURE — 83615 LACTATE (LD) (LDH) ENZYME: CPT

## 2021-01-29 PROCEDURE — 88341 IMHCHEM/IMCYTCHM EA ADD ANTB: CPT

## 2021-01-29 PROCEDURE — 2060000000 HC ICU INTERMEDIATE R&B

## 2021-01-29 PROCEDURE — 88342 IMHCHEM/IMCYTCHM 1ST ANTB: CPT

## 2021-01-29 PROCEDURE — 87205 SMEAR GRAM STAIN: CPT

## 2021-01-29 PROCEDURE — 97116 GAIT TRAINING THERAPY: CPT

## 2021-01-29 PROCEDURE — 84157 ASSAY OF PROTEIN OTHER: CPT

## 2021-01-29 PROCEDURE — 6370000000 HC RX 637 (ALT 250 FOR IP): Performed by: INTERNAL MEDICINE

## 2021-01-29 PROCEDURE — 85027 COMPLETE CBC AUTOMATED: CPT

## 2021-01-29 PROCEDURE — 0W9B3ZZ DRAINAGE OF LEFT PLEURAL CAVITY, PERCUTANEOUS APPROACH: ICD-10-PCS | Performed by: RADIOLOGY

## 2021-01-29 PROCEDURE — 80048 BASIC METABOLIC PNL TOTAL CA: CPT

## 2021-01-29 PROCEDURE — 99232 SBSQ HOSP IP/OBS MODERATE 35: CPT | Performed by: INTERNAL MEDICINE

## 2021-01-29 PROCEDURE — 2500000003 HC RX 250 WO HCPCS: Performed by: RADIOLOGY

## 2021-01-29 PROCEDURE — 82150 ASSAY OF AMYLASE: CPT

## 2021-01-29 PROCEDURE — 82945 GLUCOSE OTHER FLUID: CPT

## 2021-01-29 PROCEDURE — 2709999900 IR GUIDED THORACENTESIS PLEURAL

## 2021-01-29 PROCEDURE — 87070 CULTURE OTHR SPECIMN AEROBIC: CPT

## 2021-01-29 PROCEDURE — 89051 BODY FLUID CELL COUNT: CPT

## 2021-01-29 PROCEDURE — 88305 TISSUE EXAM BY PATHOLOGIST: CPT

## 2021-01-29 PROCEDURE — 71046 X-RAY EXAM CHEST 2 VIEWS: CPT

## 2021-01-29 PROCEDURE — 32555 ASPIRATE PLEURA W/ IMAGING: CPT | Performed by: RADIOLOGY

## 2021-01-29 PROCEDURE — 97535 SELF CARE MNGMENT TRAINING: CPT

## 2021-01-29 PROCEDURE — 82042 OTHER SOURCE ALBUMIN QUAN EA: CPT

## 2021-01-29 PROCEDURE — 99222 1ST HOSP IP/OBS MODERATE 55: CPT | Performed by: RADIOLOGY

## 2021-01-29 PROCEDURE — 93010 ELECTROCARDIOGRAM REPORT: CPT | Performed by: INTERNAL MEDICINE

## 2021-01-29 PROCEDURE — 88112 CYTOPATH CELL ENHANCE TECH: CPT

## 2021-01-29 PROCEDURE — 36415 COLL VENOUS BLD VENIPUNCTURE: CPT

## 2021-01-29 RX ORDER — DIPHENHYDRAMINE HCL 25 MG
50 TABLET ORAL ONCE
Status: DISCONTINUED | OUTPATIENT
Start: 2021-01-29 | End: 2021-02-02 | Stop reason: HOSPADM

## 2021-01-29 RX ORDER — LIDOCAINE HYDROCHLORIDE 20 MG/ML
INJECTION, SOLUTION INFILTRATION; PERINEURAL
Status: COMPLETED | OUTPATIENT
Start: 2021-01-29 | End: 2021-01-29

## 2021-01-29 RX ORDER — PREDNISONE 50 MG/1
50 TABLET ORAL ONCE
Status: DISCONTINUED | OUTPATIENT
Start: 2021-01-29 | End: 2021-02-02 | Stop reason: HOSPADM

## 2021-01-29 RX ADMIN — CITALOPRAM HYDROBROMIDE 20 MG: 20 TABLET ORAL at 10:28

## 2021-01-29 RX ADMIN — LIDOCAINE HYDROCHLORIDE 7 ML: 20 INJECTION, SOLUTION INFILTRATION; PERINEURAL at 12:31

## 2021-01-29 RX ADMIN — ASPIRIN 81 MG: 81 TABLET, CHEWABLE ORAL at 10:29

## 2021-01-29 RX ADMIN — LEVOTHYROXINE SODIUM 88 MCG: 88 TABLET ORAL at 06:04

## 2021-01-29 RX ADMIN — Medication 10 ML: at 21:04

## 2021-01-29 RX ADMIN — DILTIAZEM HYDROCHLORIDE 120 MG: 120 CAPSULE, COATED, EXTENDED RELEASE ORAL at 21:02

## 2021-01-29 RX ADMIN — POTASSIUM CHLORIDE 20 MEQ: 20 TABLET, EXTENDED RELEASE ORAL at 16:54

## 2021-01-29 RX ADMIN — CARVEDILOL 3.12 MG: 3.12 TABLET, FILM COATED ORAL at 21:02

## 2021-01-29 RX ADMIN — FUROSEMIDE 20 MG: 20 TABLET ORAL at 10:29

## 2021-01-29 RX ADMIN — CARVEDILOL 3.12 MG: 3.12 TABLET, FILM COATED ORAL at 10:29

## 2021-01-29 RX ADMIN — DILTIAZEM HYDROCHLORIDE 120 MG: 120 CAPSULE, COATED, EXTENDED RELEASE ORAL at 10:28

## 2021-01-29 NOTE — CONSULTS
Pulmonary Medicine  Consult Note      Reason for consultation: Shortness of breath, left pleural effusion and left lung collapse    Consulting physician: Dr. Talya Andrade:    This is a 24-year-old female who was presented to ER with complaint of shortness of breath. She has past medical history significant for SVT status post ablation, descending thoracic aortic aneurysm 4.9 cm, ascending aortic aneurysm 4.3 cm, paroxysmal A. fib, history of DVT. She has increased shortness of breath as well as rapid heartbeat and chest pain. She was noted to have an A. fib with RVR and acute decompensated heart failure. Pulmonary consulted due to left-sided pleural effusion with infiltration and collapse. Patient underwent for left-sided thoracentesis and about 755 cc of pleural fluid removed today. She has no complaint of fever or chills. No nausea vomiting or diarrhea. Past Medical History:        Diagnosis Date    Ascending aortic aneurysm ([address] 6/23/2017    Charcot Arleen Tooth muscular atrophy     Depression     Descending aortic aneurysm ([address] 6/23/2017    Essential hypertension 2/16/2018    Headache     HTN (hypertension)     Impaired mobility and activities of daily living     Lumbar stenosis with neurogenic claudication     Myelopathy ([address]     Osteoarthritis        Past Surgical History:        Procedure Laterality Date    JOINT REPLACEMENT      THORACENTESIS Left 01/29/2021    total of 755 cc removed per Dr Db Cope specimen sent to lab       Social History:     reports that she has never smoked. She has never used smokeless tobacco. She reports that she does not drink alcohol or use drugs.     Family History:       Problem Relation Age of Onset    Arthritis Mother     Arthritis Father     High Blood Pressure Father        Allergies:  Latex and Tape [adhesive tape]        MEDICATIONS during current hospitalization:    Continuous Infusions:    Scheduled Meds:   predniSONE  50 mg Oral Once    diphenhydrAMINE  50 mg Oral Once    hydrocortisone sodium succinate PF  200 mg Intravenous Once    losartan  50 mg Oral Daily    furosemide  20 mg Oral Daily    carvedilol  3.125 mg Oral BID    enoxaparin  1 mg/kg Subcutaneous Daily    aspirin  81 mg Oral Daily    citalopram  20 mg Oral Daily    dilTIAZem  120 mg Oral BID    potassium chloride  20 mEq Oral Daily with breakfast    levothyroxine  88 mcg Oral Daily    sodium chloride flush  10 mL Intravenous 2 times per day       PRN Meds:LORazepam, sodium chloride flush, sodium chloride flush, promethazine **OR** ondansetron, acetaminophen **OR** acetaminophen, polyethylene glycol    REVIEW OF SYSTEMS:  As in history of present illness  Other 14 point review of system is negative. PHYSICAL EXAM:    Vitals:  /64   Pulse 66   Temp 97.7 °F (36.5 °C) (Oral)   Resp 16   Ht 5' 4\" (1.626 m)   Wt 160 lb (72.6 kg)   SpO2 97%   BMI 27.46 kg/m²   General: * Alert, awake,  .comfortable in bed, No distress. Head: Atraumatic , Normocephalic   Eyes: PERRL. No sclera icterus. No conjunctival injection. No discharge   ENT: No nasal  discharge. Pharynx clear. Neck:  Trachea midline. No thyromegaly, no JVD, No cervical adenopathy. Chest : Bilaterally symmetrical ,Normal effort,  No accessory muscle use  Lung : Diminished breath sound at left base with few left basilar rales. No wheezing. No rhonchi. Heart[de-identified] Normal  rate. Regular rhythm. No mumur ,  Rub or gallop  ABD: Non-tender. Non-distended. No masses. No organmegaly. Normal bowel sounds. No hernia. Extremities: Trace pitting in both lower leg , No Cyanosis ,No clubbing  Neuro: no focal weakness   Skin: Warm and dry. No erythema rash on exposed extremities.         Data Review  Recent Labs     01/27/21  1201 01/28/21  0516 01/29/21  0535   WBC 11.1* 10.0 9.2   HGB 11.9* 10.9* 11.4*   HCT 36.8* 32.7* 35.4*    267 263      Recent Labs     01/27/21  1201 01/28/21  0516 01/29/21  0535    142 144   K 3.8 3.4 3.5    105 108*   CO2 24 26 26   BUN 22 23 22   CREATININE 0.76 0.64 0.56   GLUCOSE 175* 104* 100*       MV Settings: ABGs: No results for input(s): PHART, ZYT8WTU, PO2ART, RYU8QML, BEART, Q8LTYWZP, RQS6ECM in the last 72 hours. O2 Device: Nasal cannula  O2 Flow Rate (L/min): 4 L/min  No results found for: [address]    Radiology  Echo Complete 2d W Doppler W Color    Result Date: 1/27/2021  Transthoracic Echocardiography Report (TTE)  Demographics  Patient Name   Jasbir Dominik        Gender              Female                 Les Bleacher  Patient Number [id number]          Race                                                   Ethnicity  Visit Number   [id number]         Room Number         Z412  Corporate ID                     Date of Study       01/27/2021  Accession      [id number]        Referring Physician  Number  Date of Birth  07/12/1932        Sonographer         Nayana STRONG  Age            80 year(s)        Interpreting        [facility])                                   Physician           Cardiology                                                       Juan Carlos Boyd MD Procedure Type of Study  TTE procedure:ECHO COMPLETE 2D W/DOP W/COLOR. Procedure Date Date: 01/27/2021 Start: 10:26 AM Study Location: Portable Technical Quality: Adequate visualization Indications:Congestive heart failure. Patient Status: Routine Height: 64 inches Weight: 165 pounds BSA: 1.8 m^2 BMI: 28.32 kg/m^2 BP: 108/77 mmHg  Conclusions  Summary  Mitral annular calcification is present. 1+ MR  Left ventricular ejection fraction is visually estimated at 40%. E/A flow reversal noted. Suggestive of diastolic dysfunction.   Signature  ----------------------------------------------------------------  Electronically signed by Juan Carlos Boyd MD(Interpreting  physician) on 01/27/2021 11:37 AM  ----------------------------------------------------------------  Findings Left Ventricle Peak Gradient: 10.44 mmHg              Mean Gradient: 6.01 mmHg  Area (continuity): 1.83 cm^2           Area (2D): 1.8 cm^2  AV VTI: 28.8 cm  Cusp Separation: 1.25 cm  Tricuspid Valve  Estimated RVSP: 37.54 mmHg              Estimated RAP: 10 mmHg  TR Velocity: 2.62 m/s                   TR Gradient: 27.54 mmHg  Pulmonic Valve  Peak Velocity: 0.96 m/s           Peak Gradient: 3.66 mmHg                                    Estimated PASP: 37.54 mmHg  LVOT  Peak Velocity: 0.89 m/s              Mean Velocity: 0.57 m/s  Peak Gradient: 3.16 mmHg             Mean Gradient: 1.58 mmHg  LVOT Diameter: 2.04 cm               LVOT VTI: 16.15 cm Structures  Left Atrium  LA Dimension: 4.54 cm                        LA Area: 11.81 cm^2  LA/Aorta: 1.25  LA Volume/Index: 44.64 ml /25 m^2  Left Ventricle  Diastolic Dimension: 2.33 cm         Systolic Dimension: 4.69 cm  Septum Diastolic: 3.98 cm            Septum Systolic: 3.41 cm  PW Diastolic: 1.5 cm                 PW Systolic: 7.05 cm                                       FS: 19.1 %  LV EDV/LV EDV Index: 87.32 ml/49 m^2 LV ESV/LV ESV Index: 52.68 ml/29 m^2  EF Calculated: 39.7 %                LV Length: 6.12 cm  LVOT Diameter: 2.04 cm  Right Atrium  RA Systolic Pressure: 10 mmHg  Right Ventricle  Diastolic Dimension: 7.48 cm                                    RV Systolic Pressure: 52.00 mmHg Aorta/ Miscellaneous Aorta  Aortic Root: 3.62 cm  LVOT Diameter: 2.04 cm    Xr Chest (2 Vw)    1. No evidence of pneumothorax. Resolution of left pleural effusion. Cardiac silhouette remains enlarged. Left lower lobe hazy opacity may represent atelectasis versus pneumonia or infiltrate. COMPARISON: No prior studies available for comparison. DIAGNOSIS:  Post left thoracentesis. Dr. Genesis Porter to read.  COMMENTS: I50.43 Acute on chronic combined systolic and diastolic CHF (congestive heart failure) (HCC) ICD10 TECHNIQUE: XR CHEST (2 VW) FINDINGS: Left lower lobe opacity may represent atelectasis versus pneumonia or infiltrate. Interval resolution of left pleural effusion. No evidence of pneumothorax. Cardiac silhouette is enlarged. Visualized soft tissues, and osseous structures are unremarkable. Cta Chest W Wo Contrast    Result Date: 1/26/2021  EXAMINATION:  CHEST CTA WITH CONTRAST (PULMONARY EMBOLISM PROTOCOL) CLINICAL HISTORY:   PE   I50.43 Acute on chronic combined systolic and diastolic CHF (congestive heart failure) ([address] ICD10. Technique:  Spiral axial CT acquisition of the chest from the thoracic inlet to the upper abdomen following IV contrast.  2-D images were reconstructed in the sagittal and coronal planes. 3-D MIPS images were generated in the coronal and axial planes. Images were reviewed on the PACS workstation. All images including the 3-D MIPS were personally archived. Contrast:  IV administration of 100 ml Isovue 300 All CT scans at this facility use dose modulation, iterative reconstruction, and/or weight based dosing when appropriate to reduce radiation dose to as low as reasonably achievable. Comparison:  CTA chest 3/15/2019  RESULT: Limitations: Motion artifact. Evaluation for thromboembolic disease:      - Right heart chambers:  No thromboembolic disease.      - Main pulmonary arteries:  No thromboembolic disease.      - Lobar pulmonary arteries:  No thromboembolic disease.        - Segmental pulmonary arteries:  No thromboembolic disease.        - Subsegmental pulmonary arteries:  Not well visualized due to motion. Lines, tubes, and devices:  None. Lung parenchyma and pleura: Tortuous course of the trachea. Central airways appear grossly patent. Moderate left and small right pleural effusions with associated atelectasis. Limitations in evaluation of the lung parenchyma secondary to motion. Other areas of probable scarring/atelectasis. No pneumothorax. Thoracic inlet, heart, and mediastinum: Visualized thyroid unremarkable.  No axillary, mediastinal, or hilar consistent with increasing Central vascular congestion. The costophrenic angles are blunted secondary to pleural effusions bilaterally, left greater than  right. No pneumothorax. There are infiltrates/atelectasis within lung bases, left greater than right. There are moderate degenerative changes in spine. CARDIAC ENLARGEMENT WITH CENTRAL VESSEL CONGESTION AND BILATERAL PLEURAL EFFUSIONS, POSSIBLE CONGESTIVE HEART FAILURE. BIBASILAR INFILTRATES/ATELECTASIS, LEFT GREATER THAN RIGHT. Assessment and  plan:     1. Acute on chronic decompensated diastolic congestive heart failure  2. Paroxysmal A. fib with RVR  3. Left pleural effusion with basilar atelectasis, doubt pneumonia  4. Hypoxia on O2,  secondary to above  5. History of SVT status post ablation  6. Coronary artery disease  7. History of ascending and descending thoracic aortic aneurysm  8. Hypertension    Patient had chest x-ray shows right-sided trachea and left pleural effusion, atelectasis and infiltration. She had thoracentesis done about 755 cc of pleural fluid removed. Repeat chest x-ray showed left lower lobe opacity may represent atelectasis or pneumonia interval resolution of left pleural effusion no pneumothorax. She is comfortable in bed with 4 L O2 via nasal cannula and O2 saturation is 97%. Continue O2 to keep saturation 90% above she is not having short of breath at this time most likely left basilar atelectasis. Doubt active pneumonia. If patient develop fever add antibiotic will follow pleural fluid result.     Electronically signed by [id number] 296Driss Sanders Se, MD, FCCP on 1/29/2021 at 5:35 PM

## 2021-01-29 NOTE — CONSULTS
CC:   Chief Complaint   Patient presents with    Shortness of Breath     sob last 3 days        HPI:   History obtained from medical chart and some from patient. Patient is Dameon Edward, though does speak little [place]. Patient is a pleasant 80year old woman who presented to the hospital with shortness of breath. Patient has a history of SVT, status post ablation, descending thoracic aortic aneurysm, a descending aortic aneurysm, and atrial fibrillation. She has a history of prior DVT, which was provoked. Patient presented with significant shortness of breath and rapid heartbeat and chest pain and tightness. She was noted to be in atrial fibrillation with rapid ventricular response and decompensated congestive heart failure. During admission she was noted to have a large left pleural effusion. Interventional radiology was consulted for diagnostic and therapeutic ultrasound-guided thoracentesis. Family History   Problem Relation Age of Onset    Arthritis Mother     Arthritis Father     High Blood Pressure Father        Past Surgical History:   Procedure Laterality Date    JOINT REPLACEMENT          Past Medical History:   Diagnosis Date    Ascending aortic aneurysm ([address] 6/23/2017    Charcot Arleen Tooth muscular atrophy     Depression     Descending aortic aneurysm ([address] 6/23/2017    Essential hypertension 2/16/2018    Headache     HTN (hypertension)     Impaired mobility and activities of daily living     Lumbar stenosis with neurogenic claudication     Myelopathy ([address]     Osteoarthritis        Social History     Socioeconomic History    Marital status:       Spouse name: None    Number of children: None    Years of education: None    Highest education level: None   Occupational History    None   Social Needs    Financial resource strain: None    Food insecurity     Worry: None     Inability: None    Transportation needs     Medical: None     Non-medical: None   Tobacco Use    Smoking status: Never Smoker    Smokeless tobacco: Never Used   Substance and Sexual Activity    Alcohol use: No     Alcohol/week: 0.0 standard drinks    Drug use: No    Sexual activity: None   Lifestyle    Physical activity     Days per week: None     Minutes per session: None    Stress: None   Relationships    Social connections     Talks on phone: None     Gets together: None     Attends Cheondo[employer] service: None     Active member of club or organization: None     Attends meetings of clubs or organizations: None     Relationship status:     Intimate partner violence     Fear of current or ex partner: No     Emotionally abused: No     Physically abused: No     Forced sexual activity: No   Other Topics Concern    None   Social History Narrative    Lives With: Alone    Has a son in the area    Type of Home[address]: One level    Home Access: Stairs to enter with rails- Number of Steps: 2- Rails: Both    Bathroom Shower/Tub: Tub/Shower unit, Bathroom Equipment: Grab bars in shower, Shower chair    Home Equipment: Rolling walker, Cane(Pt infrequemtly uses DME for ambulation and prefers to furniture walk in home)    ADL Assistance: Independent, [address]: [place] [address] Responsibilities: Yes    Ambulation Assistance: Independent, Transfer Assistance: Independent    Additional Comments: Son stops over 2 times daily           Allergies   Allergen Reactions    Latex     Tape [Adhesive Tape]        No current facility-administered medications on file prior to encounter.       Current Outpatient Medications on File Prior to Encounter   Medication Sig Dispense Refill    dilTIAZem (CARDIZEM CD) 120 MG extended release capsule Take 1 capsule by mouth 2 times daily 60 capsule 1    Boswellia-Glucosamine-Vit D (OSTEO BI-FLEX ONE PER DAY) TABS Take by mouth daily      Calcium Carbonate-Vitamin D (CALTRATE 600+D PO) Take by mouth daily      Multiple Vitamins-Minerals (CENTRUM SILVER ULTRA WOMENS) TABS Take by mouth daily      vitamin B-12 (CYANOCOBALAMIN) 1000 MCG tablet Take 1,000 mcg by mouth daily      KLOR-CON M20 20 MEQ extended release tablet TAKE 1 TABLET DAILY WITH BREAKFAST (MUST CALL OFFICE FOR FOLLOW UP) 90 tablet 3    hydrALAZINE (APRESOLINE) 10 MG tablet TAKE 1 TABLET IN THE EVENING. repeat dose IN THE MORNING if systolic >114  5    losartan (COZAAR) 50 MG tablet Take 50 mg by mouth 2 times daily       citalopram (CELEXA) 20 MG tablet Take 20 mg by mouth daily       SYNTHROID 88 MCG tablet Take 88 mcg by mouth daily       aspirin 81 MG tablet Take 81 mg by mouth daily          Review of Systems   Constitutional: Negative. Negative for chills, diaphoresis and fever. HENT: Negative. Negative for congestion, ear pain, hearing loss and tinnitus. Eyes: Negative. Negative for photophobia. Respiratory: Positive for chest tightness and shortness of breath. Negative for cough and wheezing. Cardiovascular: Positive for palpitations. Negative for chest pain and leg swelling. Gastrointestinal: Negative. Negative for abdominal pain, constipation, diarrhea, nausea and vomiting. Genitourinary: Negative. Negative for dysuria, flank pain, frequency, hematuria and urgency. Musculoskeletal: Negative. Negative for back pain and neck pain. Skin: Negative. Negative for rash. Allergic/Immunologic: Negative for environmental allergies. Neurological: Negative. Negative for dizziness, tremors, weakness and headaches. Hematological: Does not bruise/bleed easily. Psychiatric/Behavioral: Negative. Negative for hallucinations and suicidal ideas. The patient is not nervous/anxious. OBJECTIVE:  /83   Pulse 71   Temp 98.1 °F (36.7 °C)   Resp 18   Ht 5' 4\" (1.626 m)   Wt 160 lb (72.6 kg)   SpO2 98%   BMI 27.46 kg/m²     Physical Exam  Constitutional:       General: She is not in acute distress.      Appearance: She is well-developed. She is not diaphoretic. HENT:      Head: Normocephalic and atraumatic. Nose: Nose normal.      Mouth/Throat:      Pharynx: No oropharyngeal exudate. Eyes:      General: No scleral icterus. Right eye: No discharge. Left eye: No discharge. Conjunctiva/sclera: Conjunctivae normal.   Neck:      Musculoskeletal: Neck supple. Thyroid: No thyromegaly. Vascular: No JVD. Trachea: No tracheal deviation. Cardiovascular:      Rate and Rhythm: Normal rate and regular rhythm. Heart sounds: Normal heart sounds. No murmur. No friction rub. No gallop. Pulmonary:      Effort: No respiratory distress. Breath sounds: No stridor. Examination of the left-upper field reveals decreased breath sounds. Examination of the left-middle field reveals decreased breath sounds. Examination of the left-lower field reveals decreased breath sounds. Decreased breath sounds present. No wheezing or rales. Chest:      Chest wall: No tenderness. Abdominal:      General: Bowel sounds are normal. There is no distension. Palpations: Abdomen is soft. There is no mass. Tenderness: There is no abdominal tenderness. There is no guarding or rebound. Hernia: No hernia is present. Musculoskeletal:         General: No tenderness or deformity. Skin:     General: Skin is dry. Coloration: Skin is not pale. Findings: No erythema or rash. Neurological:      Mental Status: She is alert and oriented to person, place, and time. Cranial Nerves: No cranial nerve deficit. Psychiatric:         Behavior: Behavior normal.         Thought Content:  Thought content normal.         Judgment: Judgment normal.         ASSESSMENT ANDPLAN:    ASSESSMENT: Patient with congestive heart failure and acute decompensation with atrial fibrillation and rapid ventricular response, now with a left-sided pleural effusion    PLAN: Ultrasound-guided left-sided thoracentesis      CURTIS Vivian Sullivan MD, Keron Fowler

## 2021-01-29 NOTE — PROGRESS NOTES
[facility]CC[facility] THERAP[facility] MED SURG TREATMENT NOTE     Date: 2021  Patient Name: Jose L Sepulveda        MRN: [id number]  Account: [de-identified]   : 1932  (80 y.o.)  Room: [place] [address]    Chart Review:  Diagnosis:  The primary encounter diagnosis was Dyspnea, unspecified type. A diagnosis of Acute congestive heart failure, unspecified heart failure type [facility]) was also pertinent to this visit. Restrictions:    Restrictions/Precautions  Restrictions/Precautions: Fall Risk(High risk per Bashir Guerrero)         Subjective:  Patient speaks minimal English throughout evaluation; however, responds well to verbal, visual, and tactile cues. Pain:  Start of tx:  Pre Treatment Pain Screening  Pain at present: 0  Scale Used: Numeric Score  Intervention List: Patient able to continue with treatment    End of tx:  Pain Assessment  Patient Currently in Pain: Denies  Pain Assessment: 0-10  Pain Level: 0    Objective: Patient agreeable to participate in OT treatment    ADL:  ADL  Feeding: Setup  Grooming: Setup  UE Bathing: Setup  LE Bathing: Minimal assistance  UE Dressing: Minimal assistance  LE Dressing: Moderate assistance  Toileting: Contact guard assistance  Additional Comments: Patient performs sponge bath seated at bedside chair at the above levels. Patient requires total assistance to doff/don socks. Patient dresses in a gown only.   Toilet Transfers  Toilet - Technique: Ambulating  Equipment Used: Grab bars  Toilet Transfer: Contact guard assistance  Tub Transfers  Tub Transfers: Not tested  Shower Transfers  Shower Transfers: Not tested      Therapy key for assistance levels -   Independent = Pt. is able to perform task with no assistance but may require a device   Stand by assistance = Pt. does not perform task at an independent level but does not need physical assistance, requires verbal cues  Minimal, Moderate, Maximal Assistance = Pt. requires physical assistance (25%, 50%, 75% assist from helper) for task but is able to actively participate in task   Dependent = Pt. requires total assistance with task and is not able to actively participate with task completion    Functional Balance:  Balance  Sitting Balance: Supervision  Standing Balance: Contact guard assistance   Functional Mobility  Functional - Mobility Device: Rolling Walker  Activity: To/from bathroom  Assist Level: Contact guard assistance    Cognition:  Cognition  Overall Cognitive Status: Exceptions  Arousal/Alertness: Appropriate responses to stimuli  Following Commands: Follows one step commands consistently(with verbal, visual, and tactile cues secondary to language barrier)  Attention Span: Appears intact  Memory: (Unable to accurately assess secondary to language barrier)  Safety Judgement: Decreased awareness of need for safety  Problem Solving: Assistance required to generate solutions, Assistance required to implement solutions, Assistance required to identify errors made, Assistance required to correct errors made  Insights: Fully aware of deficits  Initiation: Does not require cues  Sequencing: Does not require cues  Cognition Comment: Pt follows one step commands with incresased time.   pt exhibiting increased anxiety when completing tasks    Bed Mobility  Bed mobility  Supine to Sit: Stand by assistance(HOB elevated)  Sit to Supine: Unable to assess(Patient sitting up in chair)    UE Exercises: Not performed this treatment session    Treatment consisted of:  ADL Training  Transfer Training    Assessment/Discharge Disposition:     Performance deficits / Impairments: Decreased functional mobility , Decreased balance, Decreased ADL status, Decreased high-level IADLs, Decreased endurance, Decreased strength, Decreased posture  Prognosis: Good  Discharge Recommendations: Continue to assess pending progress  History: 10 complexities  Exam: 7 complexities  Assistance / Modification: Max A      6-Click  How much help for putting on and taking off regular lower body clothing?: A Lot  How much help for Bathing?: A Little  How much help for Toileting?: A Little  How much help for putting on and taking off regular upper body clothing?: A Little  How much help for taking care of personal grooming?: None  How much help for eating meals?: None  AM-[facility] Inpatient Daily Activity Raw Score: 19  AM-PAC Inpatient ADL T-Scale Score : 40.22  ADL Inpatient CMS 0-100% Score: 42.8    Plan:  Continue OT per POC    Goals/Plan:    Improve Balance  Improve Strength  Improve Functional Transfers  Improve ADL [place]    Minutes:    OT Individual Minutes  Time In: 7591  Time Out: 4811  Minutes: 13    ADL trainin minutes    Electronically signed by: Electronically signed by SERAFIN Saldana on 2021 at 11:30 AM

## 2021-01-29 NOTE — FLOWSHEET NOTE
Patient's Son here, Updated regarding Thoracentesis. Patient's Son aware that plan is to do cardiac cath Monday. He is agreeable to this POC. Ashley Lux

## 2021-01-29 NOTE — FLOWSHEET NOTE
Patient returned to room. tansfered by walking from cart to bed without difficulty. Patient denies c/o pain or SOB Lt flank bandaid in place.  VS stable

## 2021-01-29 NOTE — PROGRESS NOTES
Chief Complaint   Patient presents with    Shortness of Breath     sob last 3 days       SUBJECTIVE: Daughter-in-law is at the bedside. All questions were answered. Able to provide interpretation. Patient denies any chest pain. Does complain of shortness of breath. She complains of left flank pain. Zeng in place with good urine output. nsr in 80's on telemetry. Hemodynamically stable.    -3.390 L total net fluid balance    1-28-21: Patient with low blood pressures today, Coreg was initiated yesterday. She complains of lightheadedness and dizziness. She does have atypical reproducible chest pain on the left side. Son is present at the bedside and answered all questions. UA is negative. Echo with ejection fraction of 40%, 1+ mitral regurgitation. Grade 1 diastolic dysfunction. 1/29/2021: Status post chest x-ray with significant left pleural effusion/infiltrate. Pulmonary consulted as well as interventional radiology. Status post thoracentesis with 700 cc of fluid removed. Sinus rhythm on telemetry. Patient developed hypotension yesterday and hydralazine was discontinued and Lasix changed to p.o. Also losartan was changed to once daily.       Past Medical History:   Diagnosis Date    Ascending aortic aneurysm ([address] 6/23/2017    Charcot Arleen Tooth muscular atrophy     Depression     Descending aortic aneurysm ([address] 6/23/2017    Essential hypertension 2/16/2018    Headache     HTN (hypertension)     Impaired mobility and activities of daily living     Lumbar stenosis with neurogenic claudication     Myelopathy ([address]     Osteoarthritis      Patient Active Problem List   Diagnosis    Lumbar stenosis with neurogenic claudication    Acquired scoliosis    Osteoporosis    Charcot Arleen Tooth muscular atrophy    Excess weight    Osteoarthritis of lumbar spine with myelopathy    Osteoarthritis    Myelopathy ([address]    Impaired mobility and activities of daily living due to [employer], severe lumbar canal stenosis s/p Rt and Rui L3-4, L4-5 microdissection and decompression, [facility] admit 6/23/16    Headache    Depression    Ascending aortic aneurysm (HCC)    Descending aortic aneurysm (HCC)    Dizziness    Shortness of breath    Essential hypertension    Acute on chronic combined systolic and diastolic CHF (congestive heart failure) (HCC)    Gait abnormality       Current Facility-Administered Medications   Medication Dose Route Frequency Provider Last Rate Last Admin    predniSONE (DELTASONE) tablet 50 mg  50 mg Oral Once Jorge J Holiday, DO        diphenhydrAMINE (BENADRYL) tablet 50 mg  50 mg Oral Once Jorge  Holiday, DO        hydrocortisone sodium succinate PF (SOLU-CORTEF) injection 200 mg  200 mg Intravenous Once Jorge J Holiday, DO        losartan (COZAAR) tablet 50 mg  50 mg Oral Daily Jorge  Holiday, DO        furosemide (LASIX) tablet 20 mg  20 mg Oral Daily [facility] Holiday, DO   20 mg at 01/29/21 1029    carvedilol (COREG) tablet 3.125 mg  3.125 mg Oral BID [facility] Holiday, DO   3.125 mg at 01/29/21 1029    enoxaparin (LOVENOX) injection 80 mg  1 mg/kg Subcutaneous Daily Jorge J Holiday, DO   Stopped at 01/29/21 0900    aspirin chewable tablet 81 mg  81 mg Oral Daily [facility] Holiday, DO   81 mg at 01/29/21 1029    citalopram (CELEXA) tablet 20 mg  20 mg Oral Daily [facility] Holiday, DO   20 mg at 01/29/21 1028    dilTIAZem (CARDIZEM CD) extended release capsule 120 mg  120 mg Oral BID [facility] Holiday, DO   120 mg at 01/29/21 1028    potassium chloride (KLOR-CON M) extended release tablet 20 mEq  20 mEq Oral Daily with breakfast [facility] Holiday, DO   20 mEq at 01/28/21 0947    levothyroxine (SYNTHROID) tablet 88 mcg  88 mcg Oral Daily [facility] Holiday, DO   88 mcg at 01/29/21 0604    LORazepam (ATIVAN) tablet 0.5 mg  0.5 mg Oral Q6H PRN [facility] Holiday, DO   0.5 mg at 01/26/21 1803    sodium chloride flush 0.9 % injection 10 mL  10 mL Intravenous ONCE PRN Lauren Posada MD        sodium chloride flush 0.9 % injection 10 mL  10 mL Intravenous 2 times per day Wes J Holiday, DO   10 mL at 01/28/21 2224    sodium chloride flush 0.9 % injection 10 mL  10 mL Intravenous PRN Jorge J Holiday, DO        promethazine (PHENERGAN) tablet 12.5 mg  12.5 mg Oral Q6H PRN Jorge J Holiday, DO        Or    ondansetron (ZOFRAN) injection 4 mg  4 mg Intravenous Q6H PRN [facility] Holiday, DO        acetaminophen (TYLENOL) tablet 650 mg  650 mg Oral Q6H PRN [facility] Holiday, DO        Or    acetaminophen (TYLENOL) suppository 650 mg  650 mg Rectal Q6H PRN Jorge J Holiday, DO        polyethylene glycol (GLYCOLAX) packet 17 g  17 g Oral Daily PRN Jorge J Holiday, DO           ALLERGIES: Latex and Tape [adhesive tape]      OBJECTIVE:     VITALS:  Blood pressure 128/83, pulse 68, temperature 98.1 °F (36.7 °C), resp. rate 14, height 5' 4\" (1.626 m), weight 160 lb (72.6 kg), SpO2 99 %. Body mass index is 27.46 kg/m². Physical Exam   Constitutional: She is oriented to person, place, and time. She appears well-developed and well-nourished. HENT:   Head: Normocephalic and atraumatic. Eyes: Pupils are equal, round, and reactive to light. Neck: Normal range of motion. Neck supple. No JVD present. No tracheal deviation present. No thyromegaly present. Cardiovascular: Normal rate, regular rhythm, normal heart sounds and intact distal pulses. PMI is not displaced. Exam reveals no gallop, no S3, no distant heart sounds and no friction rub. No murmur heard. Pulmonary/Chest: No respiratory distress. She has no wheezes. She has no rales. She exhibits no tenderness. Abdominal: Soft. Bowel sounds are normal. She exhibits no distension and no mass. There is no abdominal tenderness. There is no rebound and no guarding. Musculoskeletal:         General: No edema. Neurological: She is alert and oriented to person, place, and time. No cranial nerve deficit. Skin: Skin is warm and dry. No rash noted. She is not diaphoretic.  No erythema. No pallor. Psychiatric: She has a normal mood and affect. Her behavior is normal. Judgment and thought content normal.         LABS:  Recent Results (from the past 24 hour(s))   CBC    Collection Time: 01/29/21  5:35 AM   Result Value Ref Range    WBC 9.2 4.8 - 10.8 K/uL    RBC 3.96 (L) 4.20 - 5.40 M/uL    Hemoglobin 11.4 (L) 12.0 - 16.0 g/dL    Hematocrit 35.4 (L) 37.0 - 47.0 %    MCV 89.3 82.0 - 100.0 fL    MCH 28.7 27.0 - 31.3 pg    MCHC 32.1 (L) 33.0 - 37.0 %    RDW 14.3 11.5 - 14.5 %    Platelets 995 415 - 168 K/uL   Basic Metabolic Panel    Collection Time: 01/29/21  5:35 AM   Result Value Ref Range    Sodium 144 135 - 144 mEq/L    Potassium 3.5 3.4 - 4.9 mEq/L    Chloride 108 (H) 95 - 107 mEq/L    CO2 26 20 - 31 mEq/L    Anion Gap 10 9 - 15 mEq/L    Glucose 100 (H) 70 - 99 mg/dL    BUN 22 8 - 23 mg/dL    CREATININE 0.56 0.50 - 0.90 mg/dL    GFR Non-African American >60.0 >60    GFR  >60.0 >60    Calcium 8.5 8.5 - 9.9 mg/dL   Glucose, Body Fluid    Collection Time: 01/29/21  1:37 PM   Result Value Ref Range    Glucose, Fluid 110.2 mg/dL   Protein, Body Fluid    Collection Time: 01/29/21  1:37 PM   Result Value Ref Range    Protein, Fluid 4.5 g/dL   Albumin, Fluid    Collection Time: 01/29/21  1:37 PM   Result Value Ref Range    Albumin, Fluid 2.5 g/dL   Amylase, Body Fluid    Collection Time: 01/29/21  1:37 PM   Result Value Ref Range    Amylase, Fluid 23 U/L     Troponin:    Lab Results   Component Value Date    TROPONINI <0.010 01/26/2021           ASSESSMENT:        Acute on chronic decompensated diastolic congestive heart failure. Questionable right ventricular failure. Likely pulmonary hypertension  Paroxysmal atrial fibrillation with rapid ventricle response-normal sinus rhythm now.   History of coronary disease  History of SVT status post ablation  History of ascending and descending thoracic aortic aneurysm-stable  Essential hypertension  Left pleural effusion status post thoracentesis.          PLAN:   1. As always, aggressive risk factor modification is strongly recommended. We should adhere to the JNC VIII guidelines for HTN management and the NCEPATP III guidelines for LDL-C management. 2. Continue with Lasix p.o., monitor I's and O's and renal function  3. Max cardiac meds-due to hypotension blood pressure medications adjusted  4. Full dose anticoagulation, convert to oral prior to discharge  5. Monitor on telemetry  6. Cardiac catheterization on Monday given new onset cardiomyopathy ejection fraction of 40%, acute decompensated heart failure, history of moderate CAD. Discussed with family and patient, agreeable to proceed. We will perform on Monday. 7. PT/OT  8. Pulmonary and IR recommendations  9. Further recommendations to follow.     Electronically signed by Nadeem Sanders DO on 1/29/2021 at 2:46 PM

## 2021-01-29 NOTE — PROGRESS NOTES
Subjective: The patient complains of ,\" moderate acute on chronic progressive fatigue and    partially relieved by rest,medications, Pt, OT, and rest and exacerbated by recent illness. I am concerned about patients medical complexities. Pleurel effusions tapped. Low BPs couldnot tolerate therapy. ROS x10: The patient also complains of severely impaired mobility and activities of daily living. Otherwise no new problems with vision, hearing, nose, mouth, throat, dermal, cardiovascular, GI, , pulmonary, musculoskeletal, psychiatric or neurological. See Rehab H&P on Rehab chart dated . Vital signs:  /64   Pulse 61   Temp 97.7 °F (36.5 °C)   Resp 16   Ht 5' 4\" (1.626 m)   Wt 160 lb (72.6 kg)   SpO2 95%   BMI 27.46 kg/m²   I/O:   PO/Intake:  fair PO intake, no problems observed or reported. Bowel/Bladder:  continent, no problems noted. General:  Patient is well developed, adequately nourished, non-obese and     well kempt. HEENT:    PERRLA, hearing intact to loud voice, external inspection of ear     and nose benign. Inspection of lips, tongue and gums benign  Musculoskeletal: No significant change in strength or tone. All joints stable. Inspection and palpation of digits and nails show no clubbing,       cyanosis or inflammatory conditions. Neuro/Psychiatric: Affect: flat but pleasant. Alert and oriented to person, place and     Situation with    cues. No significant change in deep tendon reflexes or     sensation  Lungs:  Diminished, CTA-B. Respiration effort is normal at rest.     Heart:   S1 = S2, RRR. No loud murmurs. Abdomen:  Soft, non-tender, no enlargement of liver or spleen. Extremities:  No significant lower extremity edema or tenderness. Skin:   Intact to general survey, no visualized or palpated problems.     Rehabilitation:  Physical therapy:   Bed Mobility:      Transfers: Sit to Stand: Contact guard assistance  Stand to sit: Contact guard assistance, Ambulation 1  Surface: level tile  Device: Rolling Walker  Assistance: Contact guard assistance  Quality of Gait: bilateral feet drop  Gait Deviations: Slow Lea, Decreased step length, Decreased step height  Distance: 10 ft x 2  Comments: needs rest breaks during session for improved tolerance,      FIMS:  ,  , Assessment: Patient needs rest breaks due to SOB with ambulation and transfers. Patient demonstrates compensation during gait for bilateral foot drop. Patient would continue to benefit from PT to improve mobility, endurance, and strength.     Occupational therapy:    ,  ,      Speech therapy:        Lab/X-ray studies reviewed, analyzed and discussed with patient and staff:   Recent Results (from the past 24 hour(s))   CBC    Collection Time: 01/29/21  5:35 AM   Result Value Ref Range    WBC 9.2 4.8 - 10.8 K/uL    RBC 3.96 (L) 4.20 - 5.40 M/uL    Hemoglobin 11.4 (L) 12.0 - 16.0 g/dL    Hematocrit 35.4 (L) 37.0 - 47.0 %    MCV 89.3 82.0 - 100.0 fL    MCH 28.7 27.0 - 31.3 pg    MCHC 32.1 (L) 33.0 - 37.0 %    RDW 14.3 11.5 - 14.5 %    Platelets 756 975 - 396 K/uL   Basic Metabolic Panel    Collection Time: 01/29/21  5:35 AM   Result Value Ref Range    Sodium 144 135 - 144 mEq/L    Potassium 3.5 3.4 - 4.9 mEq/L    Chloride 108 (H) 95 - 107 mEq/L    CO2 26 20 - 31 mEq/L    Anion Gap 10 9 - 15 mEq/L    Glucose 100 (H) 70 - 99 mg/dL    BUN 22 8 - 23 mg/dL    CREATININE 0.56 0.50 - 0.90 mg/dL    GFR Non-African American >60.0 >60    GFR  >60.0 >60    Calcium 8.5 8.5 - 9.9 mg/dL   Body Fluid Cell Count with Differential    Collection Time: 01/29/21  1:37 PM   Result Value Ref Range    Cell Count Fluid Type Thoracentesis    Glucose, Body Fluid    Collection Time: 01/29/21  1:37 PM   Result Value Ref Range    Glucose, Fluid 110.2 mg/dL    Fluid Type Other    Lactate Dehydrogenase, Body Fluid    Collection Time: 01/29/21  1:37 PM   Result Value Ref Range    LD, Fluid 141.0 U/L   Protein, Body Fluid    Collection Time: 01/29/21  1:37 PM   Result Value Ref Range    Protein, Fluid 4.5 g/dL   Albumin, Fluid    Collection Time: 01/29/21  1:37 PM   Result Value Ref Range    Albumin, Fluid 2.5 g/dL   Amylase, Body Fluid    Collection Time: 01/29/21  1:37 PM   Result Value Ref Range    Amylase, Fluid 23 U/L       Echo Complete 2d W Doppler W Color    Result Date: 1/27/2021  Transthoracic Echocardiography Report (TTE)  Demographics  Patient Name   Lashay Marion        Gender              Female                 Kevin Callaway  Patient Number [id number]          Race                                                   Ethnicity  Visit Number   [id number]         Room Number         E913  Corporate ID                     Date of Study       01/27/2021  Accession      [id number]        Referring Physician  Number  Date of Birth  07/12/1932        Sonographer         [facility]  Age            80 year(s)        Interpreting        [facility])                                   Physician           Cardiology                                                       Poornima العراقي MD Procedure Type of Study  TTE procedure:ECHO COMPLETE 2D W/DOP W/COLOR. Procedure Date Date: 01/27/2021 Start: 10:26 AM Study Location: Portable Technical Quality: Adequate visualization Indications:Congestive heart failure. Patient Status: Routine Height: 64 inches Weight: 165 pounds BSA: 1.8 m^2 BMI: 28.32 kg/m^2 BP: 108/77 mmHg  Conclusions  Summary  Mitral annular calcification is present. 1+ MR  Left ventricular ejection fraction is visually estimated at 40%. E/A flow reversal noted. Suggestive of diastolic dysfunction. Signature  ----------------------------------------------------------------  Electronically signed by Poornima العراقي MD(Interpreting  physician) on 01/27/2021 11:37 AM  ----------------------------------------------------------------  Findings Left Ventricle Left ventricular ejection fraction is visually estimated at 40%. E/A flow reversal noted. Suggestive of diastolic dysfunction. Right Ventricle Normal right ventricle structure and function. Normal right ventricle systolic pressure. Left Atrium Moderately dilated left atrium. Right Atrium Moderately enlarged right atrium size. Mitral Valve Mitral annular calcification is present. 1+ MR Tricuspid Valve Normal tricuspid valve structure and function. 1-2+ TR RVSP 38 mmHg Aortic Valve Aortic valve leaflets are moderately thickened. No AS No AR Pulmonic Valve The pulmonic valve was not well visualized . Pericardial Effusion No evidence of pericardial effusion. Pleural Effusion No evidence of pleural effusion. Aorta \ Miscellaneous The aorta is within normal limits. M-Mode Measurements (cm)  LVIDd: 4.39 cm                         LVIDs: 3.55 cm  IVSd: 1.43 cm                          IVSs: 1.55 cm  LVPWd: 1.5 cm                          LVPWs: 1.48 cm  Rt. Vent.  Dimension: 3.74 cm           AO Root Dimension: 3.62 cm                                         ACS: 1.25 cm                                         LA: 4.54 cm                                         LVOT: 2.04 cm Doppler Measurements:  AV Velocity:0.02 m/s                    MV Peak E-Wave: 0.85 m/s  AV Peak Gradient: 10.44 mmHg            MV Peak A-Wave: 0.87 m/s  AV Mean Gradient: 6.01 mmHg  AV Area (Continuity):1.83 cm^2  TR Velocity:2.62 m/s                    Estimated RAP:10 mmHg  TR Gradient:27.54 mmHg                  RVSP:37.54 mmHg Valves  Mitral Valve  Peak E-Wave: 0.85 m/s                 Peak A-Wave: 0.87 m/s                                        E/A Ratio: 0.97                                        Peak Gradient: 2.88 mmHg                                        Deceleration Time: 232.8 msec  Tissue Doppler  E' Septal Velocity: 0.07 m/s  E' Lateral Velocity: 0.08 m/s  Aortic Valve  Peak Velocity: 1.62 m/s                Mean Velocity: 1.15 m/s  Peak Gradient: 10.44 mmHg              Mean Gradient: 6.01 mmHg Area (continuity): 1.83 cm^2           Area (2D): 1.8 cm^2  AV VTI: 28.8 cm  Cusp Separation: 1.25 cm  Tricuspid Valve  Estimated RVSP: 37.54 mmHg              Estimated RAP: 10 mmHg  TR Velocity: 2.62 m/s                   TR Gradient: 27.54 mmHg  Pulmonic Valve  Peak Velocity: 0.96 m/s           Peak Gradient: 3.66 mmHg                                    Estimated PASP: 37.54 mmHg  LVOT  Peak Velocity: 0.89 m/s              Mean Velocity: 0.57 m/s  Peak Gradient: 3.16 mmHg             Mean Gradient: 1.58 mmHg  LVOT Diameter: 2.04 cm               LVOT VTI: 16.15 cm Structures  Left Atrium  LA Dimension: 4.54 cm                        LA Area: 11.81 cm^2  LA/Aorta: 1.25  LA Volume/Index: 44.64 ml /25 m^2  Left Ventricle  Diastolic Dimension: 3.94 cm         Systolic Dimension: 4.32 cm  Septum Diastolic: 3.13 cm            Septum Systolic: 0.39 cm  PW Diastolic: 1.5 cm                 PW Systolic: 5.56 cm                                       FS: 19.1 %  LV EDV/LV EDV Index: 87.32 ml/49 m^2 LV ESV/LV ESV Index: 52.68 ml/29 m^2  EF Calculated: 39.7 %                LV Length: 6.12 cm  LVOT Diameter: 2.04 cm  Right Atrium  RA Systolic Pressure: 10 mmHg  Right Ventricle  Diastolic Dimension: 2.45 cm                                    RV Systolic Pressure: 90.87 mmHg Aorta/ Miscellaneous Aorta  Aortic Root: 3.62 cm  LVOT Diameter: 2.04 cm    Cta Chest W Wo Contrast    Result Date: 1/26/2021  EXAMINATION:  CHEST CTA WITH CONTRAST (PULMONARY EMBOLISM PROTOCOL) CLINICAL HISTORY:   PE   I50.43 Acute on chronic combined systolic and diastolic CHF (congestive heart failure) (HCC) ICD10. Technique:  Spiral axial CT acquisition of the chest from the thoracic inlet to the upper abdomen following IV contrast.  2-D images were reconstructed in the sagittal and coronal planes. 3-D MIPS images were generated in the coronal and axial planes. Images were reviewed on the PACS workstation.  All images including the 3-D MIPS were Cardiomegaly and small pericardial effusion. Reflux of contrast into the hepatic veins, associated with right heart failure. 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 No lung consolidative opacity. Areas of probable atelectasis/scarring. Xr Chest Portable    Result Date: 1/26/2021  EXAMINATION: Portable AP ERECT view of the chest. CLINICAL HISTORY:  SOB , Negative Covid-19 test. DATE: 1/25/2021 5:41 PM COMPARISONS: 7/24/2017 FINDINGS: The heart is moderately enlarged, similar to prior exam.  Prominent interstitial markings, consistent with increasing Central vascular congestion. The costophrenic angles are blunted secondary to pleural effusions bilaterally, left greater than  right. No pneumothorax. There are infiltrates/atelectasis within lung bases, left greater than right. There are moderate degenerative changes in spine. CARDIAC ENLARGEMENT WITH CENTRAL VESSEL CONGESTION AND BILATERAL PLEURAL EFFUSIONS, POSSIBLE CONGESTIVE HEART FAILURE. BIBASILAR INFILTRATES/ATELECTASIS, LEFT GREATER THAN RIGHT. Previous extensive, complex labs, notes and diagnostics reviewed and analyzed. ALLERGIES:    Allergies as of 01/25/2021 - Review Complete 01/25/2021   Allergen Reaction Noted    Latex  07/19/2017    Tape [adhesive tape]  06/28/2016      (please also verify by checking [facility])     Complex Physical Medicine & Rehab Issues Assess & Plan:   1. Severe abnormality of gait and mobility and impaired self-care and ADL's secondary to progressive Cardiac rehab CHF exacerbation . Functional and medical status reassessed regarding patients ability to participate in therapies and patient found to be able to participate in acute intensive comprehensive inpatient rehabilitation program including PT/OT to improve balance, ambulation, ADLs, and to improve the P/AROM.   Therapeutic modifications regarding activities in therapies, place, amount of time per day and intensity of therapy made daily. In bed therapies or bedside therapies prn.   2. Bowel and Bladder dysfunction  :  frequent toileting, ambulate to bathroom with assistance, check post void residuals. Check for C.difficile x1 if >2 loose stools in 24 hours, continue bowel & bladder program.  Monitor bowel and bladder function. Lactinex 2 PO every AC. MOM prn, Brown Bomb prn, Glycerin suppository prn, enema prn. 3. Moderate   pain as well as generalized OA pain: reassess pain every shift and prior to and after each therapy session, give prn Tylenol and   , modalities prn in therapy, masage, Lidoderm, K-pad prn. Consider scheduled AM pain meds. 4. Skin healing and breakdown risk:  continue pressure relief program.  Daily skin exams and reports from nursing. 5. Severe fatigue due to nutritional and hydration deficiency: Add and titrate vitamin B12 vitamin D and CoQ10 continue to monitor I&Os, calorie counts prn, dietary consult prn.  6. Acute episodic insomnia with situational adjustment disorder:  prn Ambien, monitor for day time sedation. 7. Falls risk elevated:  patient to use call light to get nursing assistance to get up, bed and chair alarm. 8. Elevated DVT risk: progressive activities in PT, continue prophylaxis PORTILLO hose, elevation and  see mar . 9. Complex discharge planning:  Weekly team meeting every Monday Thursday to assess progress towards goals, discuss and address social, psychological and medical comorbidities and to address difficulties they may be having progressing in therapy. Patient and family education is in progress. The patient is to follow-up with their family physician after discharge.         Complex Active General Medical Issues that complicate care Assess & Plan:    Patient Active Problem List   Diagnosis    Lumbar stenosis with neurogenic claudication    Acquired scoliosis    Osteoporosis    Charcot Arleen Tooth muscular atrophy    Excess

## 2021-01-29 NOTE — SEDATION DOCUMENTATION
NO SEDATION    1215 pt brought to xray room 6. Report was received from 1w RN. Ultrasound Guided Thoracentesis consent confirmed in chart. VSS.     1225 Patient sitting on cart side leaning over tray table. Posterior lung fields scanned with ultrasound by Ultrasound technician. Images reviewed by performing Radiologist.     1230 Procedure explained, consent signed. Time out completed. Site marked by , then procedure site prepped with chloraprep, and draped with sterile drape and towels. 1231  Left  Posterior chest site then numbed with lidocaine 2% by Dr Idelia Blizzard Centesis 5F catheter placed into pleural space using US guidance. Fluid draining appears clear suzanna. 1232 Sample of fluid obtained and placed into specimen containers to be sent for testing as ordered. Catheter attached to [facility] machine to remove fluid. 1241 Total 755 ml removed. Catheter removed. Bandaid applied. VSS. Pt tolerated well. Verbal and tactile reassurance given throughout. 1245 Patient taken for CXR via cart and specimen fluid taken to lab as ordered. 1300 chest xray complete. Dr Moses Hicks notified ready to view. 1310 report called to [facility] pt waiting for transport back to room. No changes with breathing. Pedro Gutierrez

## 2021-01-30 LAB
ANION GAP SERPL CALCULATED.3IONS-SCNC: 11 MEQ/L (ref 9–15)
BUN BLDV-MCNC: 24 MG/DL (ref 8–23)
CALCIUM SERPL-MCNC: 8.5 MG/DL (ref 8.5–9.9)
CHLORIDE BLD-SCNC: 109 MEQ/L (ref 95–107)
CO2: 26 MEQ/L (ref 20–31)
CREAT SERPL-MCNC: 0.7 MG/DL (ref 0.5–0.9)
GFR AFRICAN AMERICAN: >60
GFR NON-AFRICAN AMERICAN: >60
GLUCOSE BLD-MCNC: 100 MG/DL (ref 70–99)
HCT VFR BLD CALC: 34.8 % (ref 37–47)
HEMOGLOBIN: 11.2 G/DL (ref 12–16)
MCH RBC QN AUTO: 28.7 PG (ref 27–31.3)
MCHC RBC AUTO-ENTMCNC: 32.2 % (ref 33–37)
MCV RBC AUTO: 89.1 FL (ref 82–100)
PDW BLD-RTO: 14.2 % (ref 11.5–14.5)
PLATELET # BLD: 285 K/UL (ref 130–400)
POTASSIUM SERPL-SCNC: 3.8 MEQ/L (ref 3.4–4.9)
RBC # BLD: 3.91 M/UL (ref 4.2–5.4)
SODIUM BLD-SCNC: 146 MEQ/L (ref 135–144)
WBC # BLD: 8.5 K/UL (ref 4.8–10.8)

## 2021-01-30 PROCEDURE — 85027 COMPLETE CBC AUTOMATED: CPT

## 2021-01-30 PROCEDURE — 6370000000 HC RX 637 (ALT 250 FOR IP): Performed by: INTERNAL MEDICINE

## 2021-01-30 PROCEDURE — 6360000002 HC RX W HCPCS: Performed by: INTERNAL MEDICINE

## 2021-01-30 PROCEDURE — 80048 BASIC METABOLIC PNL TOTAL CA: CPT

## 2021-01-30 PROCEDURE — 2060000000 HC ICU INTERMEDIATE R&B

## 2021-01-30 PROCEDURE — 2700000000 HC OXYGEN THERAPY PER DAY

## 2021-01-30 PROCEDURE — 99232 SBSQ HOSP IP/OBS MODERATE 35: CPT | Performed by: INTERNAL MEDICINE

## 2021-01-30 PROCEDURE — 2580000003 HC RX 258: Performed by: INTERNAL MEDICINE

## 2021-01-30 PROCEDURE — 99233 SBSQ HOSP IP/OBS HIGH 50: CPT | Performed by: INTERNAL MEDICINE

## 2021-01-30 PROCEDURE — 36415 COLL VENOUS BLD VENIPUNCTURE: CPT

## 2021-01-30 RX ADMIN — DILTIAZEM HYDROCHLORIDE 120 MG: 120 CAPSULE, COATED, EXTENDED RELEASE ORAL at 20:56

## 2021-01-30 RX ADMIN — ACETAMINOPHEN 650 MG: 325 TABLET ORAL at 11:45

## 2021-01-30 RX ADMIN — DILTIAZEM HYDROCHLORIDE 120 MG: 120 CAPSULE, COATED, EXTENDED RELEASE ORAL at 09:11

## 2021-01-30 RX ADMIN — Medication 10 ML: at 20:56

## 2021-01-30 RX ADMIN — ACETAMINOPHEN 650 MG: 325 TABLET ORAL at 05:24

## 2021-01-30 RX ADMIN — CARVEDILOL 3.12 MG: 3.12 TABLET, FILM COATED ORAL at 09:10

## 2021-01-30 RX ADMIN — ENOXAPARIN SODIUM 80 MG: 80 INJECTION SUBCUTANEOUS at 09:11

## 2021-01-30 RX ADMIN — POTASSIUM CHLORIDE 20 MEQ: 20 TABLET, EXTENDED RELEASE ORAL at 09:17

## 2021-01-30 RX ADMIN — CITALOPRAM HYDROBROMIDE 20 MG: 20 TABLET ORAL at 09:11

## 2021-01-30 RX ADMIN — LEVOTHYROXINE SODIUM 88 MCG: 88 TABLET ORAL at 05:24

## 2021-01-30 RX ADMIN — ASPIRIN 81 MG: 81 TABLET, CHEWABLE ORAL at 09:11

## 2021-01-30 RX ADMIN — LOSARTAN POTASSIUM 50 MG: 50 TABLET, FILM COATED ORAL at 09:11

## 2021-01-30 RX ADMIN — FUROSEMIDE 20 MG: 20 TABLET ORAL at 09:11

## 2021-01-30 RX ADMIN — CARVEDILOL 3.12 MG: 3.12 TABLET, FILM COATED ORAL at 20:56

## 2021-01-30 RX ADMIN — Medication 10 ML: at 09:12

## 2021-01-30 ASSESSMENT — ENCOUNTER SYMPTOMS
CHEST TIGHTNESS: 0
EYES NEGATIVE: 1
BLOOD IN STOOL: 0
GASTROINTESTINAL NEGATIVE: 1
NAUSEA: 0
RESPIRATORY NEGATIVE: 1
WHEEZING: 0
SHORTNESS OF BREATH: 0
COUGH: 0
STRIDOR: 0

## 2021-01-30 ASSESSMENT — PAIN DESCRIPTION - ORIENTATION: ORIENTATION: LEFT;MID

## 2021-01-30 ASSESSMENT — PAIN SCALES - GENERAL: PAINLEVEL_OUTOF10: 3

## 2021-01-30 ASSESSMENT — PAIN DESCRIPTION - PROGRESSION: CLINICAL_PROGRESSION: GRADUALLY WORSENING

## 2021-01-30 ASSESSMENT — PAIN DESCRIPTION - PAIN TYPE: TYPE: ACUTE PAIN

## 2021-01-30 ASSESSMENT — PAIN - FUNCTIONAL ASSESSMENT: PAIN_FUNCTIONAL_ASSESSMENT: ACTIVITIES ARE NOT PREVENTED

## 2021-01-30 ASSESSMENT — PAIN DESCRIPTION - FREQUENCY: FREQUENCY: INTERMITTENT

## 2021-01-30 NOTE — FLOWSHEET NOTE
2130- Evening assessment and meds complete. Pt was resting comfortably upon entering room. Incisional dressing; clean, dry and intact.

## 2021-01-30 NOTE — PROGRESS NOTES
Subjective: The patient complains of ,\" moderate acute on chronic progressive fatigue and    partially relieved by rest,medications, Pt, OT, and rest and exacerbated by recent illness. I am concerned about patients medical complexities. Pleurel effusions tapped. Low BPs couldnot tolerate therapy. Will re eval post cath for transfer. ROS x10: The patient also complains of severely impaired mobility and activities of daily living. Otherwise no new problems with vision, hearing, nose, mouth, throat, dermal, cardiovascular, GI, , pulmonary, musculoskeletal, psychiatric or neurological. See Rehab H&P on Rehab chart dated . Vital signs:  /86   Pulse 67   Temp 98.6 °F (37 °C) (Oral)   Resp 18   Ht 5' 4\" (1.626 m)   Wt 160 lb 4.8 oz (72.7 kg)   SpO2 93%   BMI 27.52 kg/m²   I/O:   PO/Intake:  fair PO intake, no problems observed or reported. Bowel/Bladder:  continent, no problems noted. General:  Patient is well developed, adequately nourished, non-obese and     well kempt. HEENT:    PERRLA, hearing intact to loud voice, external inspection of ear     and nose benign. Inspection of lips, tongue and gums benign  Musculoskeletal: No significant change in strength or tone. All joints stable. Inspection and palpation of digits and nails show no clubbing,       cyanosis or inflammatory conditions. Neuro/Psychiatric: Affect: flat but pleasant. Alert and oriented to person, place and     Situation with    cues. No significant change in deep tendon reflexes or     sensation  Lungs:  Diminished, CTA-B. Respiration effort is normal at rest.     Heart:   S1 = S2, RRR. No loud murmurs. Abdomen:  Soft, non-tender, no enlargement of liver or spleen. Extremities:  No significant lower extremity edema or tenderness. Skin:   Intact to general survey, no visualized or palpated problems.     Rehabilitation:  Physical therapy:   Bed Mobility:      Transfers: Sit to Stand: Contact guard assistance  Stand to sit: Contact guard assistance, Ambulation 1  Surface: level tile  Device: Rolling Walker  Assistance: Contact guard assistance  Quality of Gait: bilateral feet drop  Gait Deviations: Slow Lea, Decreased step length, Decreased step height  Distance: 10 ft x 2  Comments: needs rest breaks during session for improved tolerance,      FIMS:  ,  , Assessment: Patient needs rest breaks due to SOB with ambulation and transfers. Patient demonstrates compensation during gait for bilateral foot drop. Patient would continue to benefit from PT to improve mobility, endurance, and strength.     Occupational therapy:    ,  ,      Speech therapy:        Lab/X-ray studies reviewed, analyzed and discussed with patient and staff:   Recent Results (from the past 24 hour(s))   CBC    Collection Time: 01/30/21  4:57 AM   Result Value Ref Range    WBC 8.5 4.8 - 10.8 K/uL    RBC 3.91 (L) 4.20 - 5.40 M/uL    Hemoglobin 11.2 (L) 12.0 - 16.0 g/dL    Hematocrit 34.8 (L) 37.0 - 47.0 %    MCV 89.1 82.0 - 100.0 fL    MCH 28.7 27.0 - 31.3 pg    MCHC 32.2 (L) 33.0 - 37.0 %    RDW 14.2 11.5 - 14.5 %    Platelets 951 040 - 769 K/uL   Basic Metabolic Panel    Collection Time: 01/30/21  4:57 AM   Result Value Ref Range    Sodium 146 (H) 135 - 144 mEq/L    Potassium 3.8 3.4 - 4.9 mEq/L    Chloride 109 (H) 95 - 107 mEq/L    CO2 26 20 - 31 mEq/L    Anion Gap 11 9 - 15 mEq/L    Glucose 100 (H) 70 - 99 mg/dL    BUN 24 (H) 8 - 23 mg/dL    CREATININE 0.70 0.50 - 0.90 mg/dL    GFR Non-African American >60.0 >60    GFR  >60.0 >60    Calcium 8.5 8.5 - 9.9 mg/dL       Echo Complete 2d W Doppler W Color    Result Date: 1/27/2021  Transthoracic Echocardiography Report (TTE)  Demographics  Patient Name   Starla Galindo        Gender              Female                 Placido Forrester  Patient Number [id number]          Race                                                   Ethnicity  Visit Number   [id number]         Room Number         H085  Corporate ID                     Date of Study       01/27/2021  Accession      [id number]        Referring Physician  Number  Date of Birth  07/12/1932        Sonographer         Sharyle Givens TAE  Age            80 year(s)        Interpreting        [facility])                                   Physician           Cardiology                                                       Lorri Reed MD Procedure Type of Study  TTE procedure:ECHO COMPLETE 2D W/DOP W/COLOR. Procedure Date Date: 01/27/2021 Start: 10:26 AM Study Location: Portable Technical Quality: Adequate visualization Indications:Congestive heart failure. Patient Status: Routine Height: 64 inches Weight: 165 pounds BSA: 1.8 m^2 BMI: 28.32 kg/m^2 BP: 108/77 mmHg  Conclusions  Summary  Mitral annular calcification is present. 1+ MR  Left ventricular ejection fraction is visually estimated at 40%. E/A flow reversal noted. Suggestive of diastolic dysfunction. Signature  ----------------------------------------------------------------  Electronically signed by Lorri Reed MD(Interpreting  physician) on 01/27/2021 11:37 AM  ----------------------------------------------------------------  Findings Left Ventricle Left ventricular ejection fraction is visually estimated at 40%. E/A flow reversal noted. Suggestive of diastolic dysfunction. Right Ventricle Normal right ventricle structure and function. Normal right ventricle systolic pressure. Left Atrium Moderately dilated left atrium. Right Atrium Moderately enlarged right atrium size. Mitral Valve Mitral annular calcification is present. 1+ MR Tricuspid Valve Normal tricuspid valve structure and function. 1-2+ TR RVSP 38 mmHg Aortic Valve Aortic valve leaflets are moderately thickened. No AS No AR Pulmonic Valve The pulmonic valve was not well visualized . Pericardial Effusion No evidence of pericardial effusion. Pleural Effusion No evidence of pleural effusion.  Aorta \ Miscellaneous The 1. 25  LA Volume/Index: 44.64 ml /25 m^2  Left Ventricle  Diastolic Dimension: 9.64 cm         Systolic Dimension: 3.87 cm  Septum Diastolic: 9.58 cm            Septum Systolic: 5.31 cm  PW Diastolic: 1.5 cm                 PW Systolic: 3.01 cm                                       FS: 19.1 %  LV EDV/LV EDV Index: 87.32 ml/49 m^2 LV ESV/LV ESV Index: 52.68 ml/29 m^2  EF Calculated: 39.7 %                LV Length: 6.12 cm  LVOT Diameter: 2.04 cm  Right Atrium  RA Systolic Pressure: 10 mmHg  Right Ventricle  Diastolic Dimension: 6.92 cm                                    RV Systolic Pressure: 99.86 mmHg Aorta/ Miscellaneous Aorta  Aortic Root: 3.62 cm  LVOT Diameter: 2.04 cm    Cta Chest W Wo Contrast    Result Date: 1/26/2021  EXAMINATION:  CHEST CTA WITH CONTRAST (PULMONARY EMBOLISM PROTOCOL) CLINICAL HISTORY:   PE   I50.43 Acute on chronic combined systolic and diastolic CHF (congestive heart failure) (HCC) ICD10. Technique:  Spiral axial CT acquisition of the chest from the thoracic inlet to the upper abdomen following IV contrast.  2-D images were reconstructed in the sagittal and coronal planes. 3-D MIPS images were generated in the coronal and axial planes. Images were reviewed on the PACS workstation. All images including the 3-D MIPS were personally archived. Contrast:  IV administration of 100 ml Isovue 300 All CT scans at this facility use dose modulation, iterative reconstruction, and/or weight based dosing when appropriate to reduce radiation dose to as low as reasonably achievable. Comparison:  CTA chest 3/15/2019  RESULT: Limitations: Motion artifact.  Evaluation for thromboembolic disease:      - Right heart chambers:  No thromboembolic disease.      - Main pulmonary arteries:  No thromboembolic disease.      - Lobar pulmonary arteries:  No thromboembolic disease.        - Segmental pulmonary arteries:  No thromboembolic disease.        - Subsegmental pulmonary arteries:  Not well visualized due to costophrenic angles are blunted secondary to pleural effusions bilaterally, left greater than  right. No pneumothorax. There are infiltrates/atelectasis within lung bases, left greater than right. There are moderate degenerative changes in spine. CARDIAC ENLARGEMENT WITH CENTRAL VESSEL CONGESTION AND BILATERAL PLEURAL EFFUSIONS, POSSIBLE CONGESTIVE HEART FAILURE. BIBASILAR INFILTRATES/ATELECTASIS, LEFT GREATER THAN RIGHT. Previous extensive, complex labs, notes and diagnostics reviewed and analyzed. ALLERGIES:    Allergies as of 01/25/2021 - Review Complete 01/25/2021   Allergen Reaction Noted    Latex  07/19/2017    Tape [adhesive tape]  06/28/2016      (please also verify by checking [facility])     Complex Physical Medicine & Rehab Issues Assess & Plan:   1. Severe abnormality of gait and mobility and impaired self-care and ADL's secondary to progressive Cardiac rehab CHF exacerbation . Functional and medical status reassessed regarding patients ability to participate in therapies and patient found to be able to participate in acute intensive comprehensive inpatient rehabilitation program including PT/OT to improve balance, ambulation, ADLs, and to improve the P/AROM. Therapeutic modifications regarding activities in therapies, place, amount of time per day and intensity of therapy made daily. In bed therapies or bedside therapies prn.   2. Bowel and Bladder dysfunction  :  frequent toileting, ambulate to bathroom with assistance, check post void residuals. Check for C.difficile x1 if >2 loose stools in 24 hours, continue bowel & bladder program.  Monitor bowel and bladder function. Lactinex 2 PO every AC. MOM prn, Brown Bomb prn, Glycerin suppository prn, enema prn. 3. Moderate   pain as well as generalized OA pain: reassess pain every shift and prior to and after each therapy session, give prn Tylenol and   , modalities prn in therapy, masage, Lidoderm, K-pad prn.  Consider scheduled AM pain meds.  4. Skin healing and breakdown risk:  continue pressure relief program.  Daily skin exams and reports from nursing. 5. Severe fatigue due to nutritional and hydration deficiency: Add and titrate vitamin B12 vitamin D and CoQ10 continue to monitor I&Os, calorie counts prn, dietary consult prn.  6. Acute episodic insomnia with situational adjustment disorder:  prn Ambien, monitor for day time sedation. 7. Falls risk elevated:  patient to use call light to get nursing assistance to get up, bed and chair alarm. 8. Elevated DVT risk: progressive activities in PT, continue prophylaxis PORTILLO hose, elevation and  see mar . 9. Complex discharge planning:  Weekly team meeting every Monday Thursday to assess progress towards goals, discuss and address social, psychological and medical comorbidities and to address difficulties they may be having progressing in therapy. Patient and family education is in progress. The patient is to follow-up with their family physician after discharge.         Complex Active General Medical Issues that complicate care Assess & Plan:    Patient Active Problem List   Diagnosis    Lumbar stenosis with neurogenic claudication    Acquired scoliosis    Osteoporosis    Charcot Arleen Tooth muscular atrophy    Excess weight    Osteoarthritis of lumbar spine with myelopathy    Osteoarthritis    Myelopathy ([address]    Impaired mobility and activities of daily living due to [employer], severe lumbar canal stenosis s/p Rt and Rui L3-4, L4-5 microdissection and decompression, [facility] admit 6/23/16    Headache    Depression    Ascending aortic aneurysm (HCC)    Descending aortic aneurysm (HCC)    Dizziness    Shortness of breath    Essential hypertension    Acute on chronic combined systolic and diastolic CHF (congestive heart failure) (HCC)    Gait abnormality   1. appropriate for rehab once medically cleared  Wale Hong D.O., PM&R     Attending [address]

## 2021-01-30 NOTE — PROGRESS NOTES
meetings of clubs or organizations: None     Relationship status:     Intimate partner violence     Fear of current or ex partner: No     Emotionally abused: No     Physically abused: No     Forced sexual activity: No   Other Topics Concern    None   Social History Narrative    Lives With: Alone    Has a son in the area    Type of Home[place]  in [address]: One level    Home Access: Stairs to enter with rails- Number of Steps: 2- Rails: Both    Bathroom Shower/Tub: Tub/Shower unit, Bathroom Equipment: Grab bars in shower, Shower chair    Home Equipment: Rolling walker, Cane(Pt infrequemtly uses DME for ambulation and prefers to furniture walk in home)    ADL Assistance: Independent, [facility] Responsibilities: Yes    Ambulation Assistance: Independent, Transfer Assistance: Independent    Additional Comments: Son stops over 2 times daily           Subjective/HPI c/o pain at [place] process. lying flat and appears comfortable. No acute events overnight. No fever    Diuresed >3L since admit. EKG: SR 50-60        Review of Systems:   Review of Systems   Constitutional: Negative. Negative for diaphoresis and fatigue. HENT: Negative. Eyes: Negative. Respiratory: Negative. Negative for cough, chest tightness, shortness of breath, wheezing and stridor. Cardiovascular: Negative. Negative for chest pain, palpitations and leg swelling. Gastrointestinal: Negative. Negative for blood in stool and nausea. Genitourinary: Negative. Musculoskeletal: Negative. Skin: Negative. Neurological: Negative. Negative for dizziness, syncope, weakness and light-headedness. Hematological: Negative. Psychiatric/Behavioral: Negative.           Physical Examination:    /79   Pulse 68   Temp 98.6 °F (37 °C) (Oral)   Resp 20   Ht 5' 4\" (1.626 m)   Wt 160 lb 4.8 oz (72.7 kg)   SpO2 93%   BMI 27.52 kg/m²    Physical Exam Constitutional: She appears healthy. No distress. HENT:   Normal cephalic and Atraumatic   Eyes: Pupils are equal, round, and reactive to light. Neck: Normal range of motion and thyroid normal. Neck supple. No JVD present. No neck adenopathy. No thyromegaly present. Cardiovascular: Normal rate, regular rhythm, intact distal pulses and normal pulses. Murmur heard. Pulmonary/Chest: Effort normal and breath sounds normal. She has no wheezes. She has no rales. She exhibits no tenderness. Abdominal: Soft. Bowel sounds are normal. There is no abdominal tenderness. Musculoskeletal: Normal range of motion. General: No tenderness or edema. Neurological: She is alert and oriented to person, place, and time. Skin: Skin is warm. No cyanosis. Nails show no clubbing.        LABS:  CBC:   Lab Results   Component Value Date    WBC 8.5 01/30/2021    RBC 3.91 01/30/2021    HGB 11.2 01/30/2021    HCT 34.8 01/30/2021    MCV 89.1 01/30/2021    MCH 28.7 01/30/2021    MCHC 32.2 01/30/2021    RDW 14.2 01/30/2021     01/30/2021     CBC with Differential:    Lab Results   Component Value Date    WBC 8.5 01/30/2021    RBC 3.91 01/30/2021    HGB 11.2 01/30/2021    HCT 34.8 01/30/2021     01/30/2021    MCV 89.1 01/30/2021    MCH 28.7 01/30/2021    MCHC 32.2 01/30/2021    RDW 14.2 01/30/2021    LYMPHOPCT 9.0 01/25/2021    MONOPCT 8.7 01/25/2021    BASOPCT 0.4 01/25/2021    MONOSABS 1.0 01/25/2021    LYMPHSABS 1.0 01/25/2021    EOSABS 0.1 01/25/2021    BASOSABS 0.0 01/25/2021     CMP:    Lab Results   Component Value Date     01/30/2021    K 3.8 01/30/2021    K 4.1 01/25/2021     01/30/2021    CO2 26 01/30/2021    BUN 24 01/30/2021    CREATININE 0.70 01/30/2021    GFRAA >60.0 01/30/2021    LABGLOM >60.0 01/30/2021    GLUCOSE 100 01/30/2021    PROT 7.3 01/25/2021    LABALBU 3.5 01/25/2021    CALCIUM 8.5 01/30/2021    BILITOT 0.4 01/25/2021    ALKPHOS 57 01/25/2021    AST 23 01/25/2021    ALT 20 01/25/2021     BMP:    Lab Results   Component Value Date     01/30/2021    K 3.8 01/30/2021    K 4.1 01/25/2021     01/30/2021    CO2 26 01/30/2021    BUN 24 01/30/2021    LABALBU 3.5 01/25/2021    CREATININE 0.70 01/30/2021    CALCIUM 8.5 01/30/2021    GFRAA >60.0 01/30/2021    LABGLOM >60.0 01/30/2021    GLUCOSE 100 01/30/2021     Magnesium:    Lab Results   Component Value Date    MG 1.9 10/02/2020     Troponin:    Lab Results   Component Value Date    TROPONINI <0.010 01/26/2021        Active Hospital Problems    Diagnosis Date Noted    Lumbar stenosis with neurogenic claudication [M48.062] 06/02/2016     Priority: Medium    Gait abnormality [R26.9] 01/27/2021     Priority: Low    Acute on chronic combined systolic and diastolic CHF (congestive heart failure) ([facility] [address] [I50.43] 01/25/2021     Priority: Low    Essential hypertension [I10] 02/16/2018     Priority: Low    Depression [F32.9]      Priority: Low    Charcot Arleen Tooth muscular atrophy [G60.0] 06/02/2016     Priority: Low        Assessment/Plan:  1. Combined CHF- improving. Continue regimen. 2. CMP - EF 40%Dr. Franco plans Cath Monday  3. PAF- remains in SR. Rate control and Lovenox. 4. HTN stable   5. Pleural effusion s/p Thoracentesis  6. Prior SVT s/p Ablation   7. Stable Thoracic Ao Aneurysm.         Electronically signed by Mayra Ravi MD on 1/30/2021 at 7:34 AM

## 2021-01-31 LAB
ANION GAP SERPL CALCULATED.3IONS-SCNC: 9 MEQ/L (ref 9–15)
BUN BLDV-MCNC: 23 MG/DL (ref 8–23)
CALCIUM SERPL-MCNC: 8.7 MG/DL (ref 8.5–9.9)
CHLORIDE BLD-SCNC: 105 MEQ/L (ref 95–107)
CO2: 28 MEQ/L (ref 20–31)
CREAT SERPL-MCNC: 0.64 MG/DL (ref 0.5–0.9)
GFR AFRICAN AMERICAN: >60
GFR NON-AFRICAN AMERICAN: >60
GLUCOSE BLD-MCNC: 108 MG/DL (ref 70–99)
HCT VFR BLD CALC: 36.9 % (ref 37–47)
HEMOGLOBIN: 11.9 G/DL (ref 12–16)
MCH RBC QN AUTO: 28.8 PG (ref 27–31.3)
MCHC RBC AUTO-ENTMCNC: 32.3 % (ref 33–37)
MCV RBC AUTO: 89.1 FL (ref 82–100)
PDW BLD-RTO: 14 % (ref 11.5–14.5)
PLATELET # BLD: 336 K/UL (ref 130–400)
POTASSIUM SERPL-SCNC: 3.8 MEQ/L (ref 3.4–4.9)
RBC # BLD: 4.14 M/UL (ref 4.2–5.4)
SODIUM BLD-SCNC: 142 MEQ/L (ref 135–144)
WBC # BLD: 9.9 K/UL (ref 4.8–10.8)

## 2021-01-31 PROCEDURE — 99233 SBSQ HOSP IP/OBS HIGH 50: CPT | Performed by: INTERNAL MEDICINE

## 2021-01-31 PROCEDURE — 6360000002 HC RX W HCPCS: Performed by: INTERNAL MEDICINE

## 2021-01-31 PROCEDURE — 85027 COMPLETE CBC AUTOMATED: CPT

## 2021-01-31 PROCEDURE — 2580000003 HC RX 258: Performed by: INTERNAL MEDICINE

## 2021-01-31 PROCEDURE — 2060000000 HC ICU INTERMEDIATE R&B

## 2021-01-31 PROCEDURE — 99232 SBSQ HOSP IP/OBS MODERATE 35: CPT | Performed by: INTERNAL MEDICINE

## 2021-01-31 PROCEDURE — 80048 BASIC METABOLIC PNL TOTAL CA: CPT

## 2021-01-31 PROCEDURE — 6370000000 HC RX 637 (ALT 250 FOR IP): Performed by: INTERNAL MEDICINE

## 2021-01-31 PROCEDURE — 36415 COLL VENOUS BLD VENIPUNCTURE: CPT

## 2021-01-31 PROCEDURE — 2700000000 HC OXYGEN THERAPY PER DAY

## 2021-01-31 RX ADMIN — DILTIAZEM HYDROCHLORIDE 120 MG: 120 CAPSULE, COATED, EXTENDED RELEASE ORAL at 09:28

## 2021-01-31 RX ADMIN — Medication 10 ML: at 21:35

## 2021-01-31 RX ADMIN — LEVOTHYROXINE SODIUM 88 MCG: 88 TABLET ORAL at 09:30

## 2021-01-31 RX ADMIN — Medication 10 ML: at 09:29

## 2021-01-31 RX ADMIN — DILTIAZEM HYDROCHLORIDE 120 MG: 120 CAPSULE, COATED, EXTENDED RELEASE ORAL at 20:22

## 2021-01-31 RX ADMIN — CARVEDILOL 3.12 MG: 3.12 TABLET, FILM COATED ORAL at 09:28

## 2021-01-31 RX ADMIN — CITALOPRAM HYDROBROMIDE 20 MG: 20 TABLET ORAL at 09:29

## 2021-01-31 RX ADMIN — CARVEDILOL 3.12 MG: 3.12 TABLET, FILM COATED ORAL at 20:22

## 2021-01-31 RX ADMIN — FUROSEMIDE 20 MG: 20 TABLET ORAL at 09:29

## 2021-01-31 RX ADMIN — ENOXAPARIN SODIUM 80 MG: 80 INJECTION SUBCUTANEOUS at 09:29

## 2021-01-31 RX ADMIN — POTASSIUM CHLORIDE 20 MEQ: 20 TABLET, EXTENDED RELEASE ORAL at 09:29

## 2021-01-31 RX ADMIN — ASPIRIN 81 MG: 81 TABLET, CHEWABLE ORAL at 09:28

## 2021-01-31 ASSESSMENT — ENCOUNTER SYMPTOMS
SHORTNESS OF BREATH: 0
COUGH: 0
BACK PAIN: 0
WHEEZING: 0
SHORTNESS OF BREATH: 1
VOMITING: 0
CONSTIPATION: 0
PHOTOPHOBIA: 0
CHEST TIGHTNESS: 1
BLOOD IN STOOL: 0
STRIDOR: 0
RESPIRATORY NEGATIVE: 1
CHEST TIGHTNESS: 0
DIARRHEA: 0
GASTROINTESTINAL NEGATIVE: 1
NAUSEA: 0
EYES NEGATIVE: 1
ABDOMINAL PAIN: 0

## 2021-01-31 NOTE — FLOWSHEET NOTE
Pt A&Ox4. Steady gait c standby assist&walker to SUNNI Zeng in place/secruement device replaced. SR on monitor. SpO2 stable on 4L NC. Lungs diminished. Pt requesting Tylenol for 3/10 left abd pain. Call light within reach. Fall precautions in place.

## 2021-01-31 NOTE — PROGRESS NOTES
Bed Mobility:      Transfers: Sit to Stand: Contact guard assistance  Stand to sit: Contact guard assistance, Ambulation 1  Surface: level tile  Device: Rolling Walker  Assistance: Contact guard assistance  Quality of Gait: bilateral feet drop  Gait Deviations: Slow Lea, Decreased step length, Decreased step height  Distance: 10 ft x 2  Comments: needs rest breaks during session for improved tolerance,      FIMS:  ,  , Assessment: Patient needs rest breaks due to SOB with ambulation and transfers. Patient demonstrates compensation during gait for bilateral foot drop. Patient would continue to benefit from PT to improve mobility, endurance, and strength.     Occupational therapy:    ,  ,      Speech therapy:        Lab/X-ray studies reviewed, analyzed and discussed with patient and staff:   Recent Results (from the past 24 hour(s))   CBC    Collection Time: 01/31/21  5:50 AM   Result Value Ref Range    WBC 9.9 4.8 - 10.8 K/uL    RBC 4.14 (L) 4.20 - 5.40 M/uL    Hemoglobin 11.9 (L) 12.0 - 16.0 g/dL    Hematocrit 36.9 (L) 37.0 - 47.0 %    MCV 89.1 82.0 - 100.0 fL    MCH 28.8 27.0 - 31.3 pg    MCHC 32.3 (L) 33.0 - 37.0 %    RDW 14.0 11.5 - 14.5 %    Platelets 094 926 - 990 K/uL   Basic Metabolic Panel    Collection Time: 01/31/21  5:50 AM   Result Value Ref Range    Sodium 142 135 - 144 mEq/L    Potassium 3.8 3.4 - 4.9 mEq/L    Chloride 105 95 - 107 mEq/L    CO2 28 20 - 31 mEq/L    Anion Gap 9 9 - 15 mEq/L    Glucose 108 (H) 70 - 99 mg/dL    BUN 23 8 - 23 mg/dL    CREATININE 0.64 0.50 - 0.90 mg/dL    GFR Non-African American >60.0 >60    GFR  >60.0 >60    Calcium 8.7 8.5 - 9.9 mg/dL       Echo Complete 2d W Doppler W Color    Result Date: 1/27/2021  Transthoracic Echocardiography Report (TTE)  Demographics  Patient Name   Justa Kat        Gender              Female                 Noe Welch  Patient Number [id number]          Race                                                   Ethnicity Visit Number   [id number]         Room Number         B872  Corporate ID                     Date of Study       01/27/2021  Accession      [id number]        Referring Physician  Number  Date of Birth  07/12/1932        Sonographer         Savita Martinez TAE  Age            80 year(s)        Interpreting        [facility])                                   Physician           Cardiology                                                       Ryan Simmons MD Procedure Type of Study  TTE procedure:ECHO COMPLETE 2D W/DOP W/COLOR. Procedure Date Date: 01/27/2021 Start: 10:26 AM Study Location: Portable Technical Quality: Adequate visualization Indications:Congestive heart failure. Patient Status: Routine Height: 64 inches Weight: 165 pounds BSA: 1.8 m^2 BMI: 28.32 kg/m^2 BP: 108/77 mmHg  Conclusions  Summary  Mitral annular calcification is present. 1+ MR  Left ventricular ejection fraction is visually estimated at 40%. E/A flow reversal noted. Suggestive of diastolic dysfunction. Signature  ----------------------------------------------------------------  Electronically signed by Ryan Simmons MD(Interpreting  physician) on 01/27/2021 11:37 AM  ----------------------------------------------------------------  Findings Left Ventricle Left ventricular ejection fraction is visually estimated at 40%. E/A flow reversal noted. Suggestive of diastolic dysfunction. Right Ventricle Normal right ventricle structure and function. Normal right ventricle systolic pressure. Left Atrium Moderately dilated left atrium. Right Atrium Moderately enlarged right atrium size. Mitral Valve Mitral annular calcification is present. 1+ MR Tricuspid Valve Normal tricuspid valve structure and function. 1-2+ TR RVSP 38 mmHg Aortic Valve Aortic valve leaflets are moderately thickened. No AS No AR Pulmonic Valve The pulmonic valve was not well visualized . Pericardial Effusion No evidence of pericardial effusion.  Pleural Effusion No evidence of pleural effusion. Aorta \ Miscellaneous The aorta is within normal limits. M-Mode Measurements (cm)  LVIDd: 4.39 cm                         LVIDs: 3.55 cm  IVSd: 1.43 cm                          IVSs: 1.55 cm  LVPWd: 1.5 cm                          LVPWs: 1.48 cm  Rt. Vent.  Dimension: 3.74 cm           AO Root Dimension: 3.62 cm                                         ACS: 1.25 cm                                         LA: 4.54 cm                                         LVOT: 2.04 cm Doppler Measurements:  AV Velocity:0.02 m/s                    MV Peak E-Wave: 0.85 m/s  AV Peak Gradient: 10.44 mmHg            MV Peak A-Wave: 0.87 m/s  AV Mean Gradient: 6.01 mmHg  AV Area (Continuity):1.83 cm^2  TR Velocity:2.62 m/s                    Estimated RAP:10 mmHg  TR Gradient:27.54 mmHg                  RVSP:37.54 mmHg Valves  Mitral Valve  Peak E-Wave: 0.85 m/s                 Peak A-Wave: 0.87 m/s                                        E/A Ratio: 0.97                                        Peak Gradient: 2.88 mmHg                                        Deceleration Time: 232.8 msec  Tissue Doppler  E' Septal Velocity: 0.07 m/s  E' Lateral Velocity: 0.08 m/s  Aortic Valve  Peak Velocity: 1.62 m/s                Mean Velocity: 1.15 m/s  Peak Gradient: 10.44 mmHg              Mean Gradient: 6.01 mmHg  Area (continuity): 1.83 cm^2           Area (2D): 1.8 cm^2  AV VTI: 28.8 cm  Cusp Separation: 1.25 cm  Tricuspid Valve  Estimated RVSP: 37.54 mmHg              Estimated RAP: 10 mmHg  TR Velocity: 2.62 m/s                   TR Gradient: 27.54 mmHg  Pulmonic Valve  Peak Velocity: 0.96 m/s           Peak Gradient: 3.66 mmHg                                    Estimated PASP: 37.54 mmHg  LVOT  Peak Velocity: 0.89 m/s              Mean Velocity: 0.57 m/s  Peak Gradient: 3.16 mmHg             Mean Gradient: 1.58 mmHg  LVOT Diameter: 2.04 cm               LVOT VTI: 16.15 cm Structures  Left Atrium  LA Dimension: 4.54 cm LA Area: 11.81 cm^2  LA/Aorta: 1.25  LA Volume/Index: 44.64 ml /25 m^2  Left Ventricle  Diastolic Dimension: 9.06 cm         Systolic Dimension: 8.50 cm  Septum Diastolic: 1.63 cm            Septum Systolic: 8.20 cm  PW Diastolic: 1.5 cm                 PW Systolic: 9.04 cm                                       FS: 19.1 %  LV EDV/LV EDV Index: 87.32 ml/49 m^2 LV ESV/LV ESV Index: 52.68 ml/29 m^2  EF Calculated: 39.7 %                LV Length: 6.12 cm  LVOT Diameter: 2.04 cm  Right Atrium  RA Systolic Pressure: 10 mmHg  Right Ventricle  Diastolic Dimension: 6.38 cm                                    RV Systolic Pressure: 10.79 mmHg Aorta/ Miscellaneous Aorta  Aortic Root: 3.62 cm  LVOT Diameter: 2.04 cm    Cta Chest W Wo Contrast    Result Date: 1/26/2021  EXAMINATION:  CHEST CTA WITH CONTRAST (PULMONARY EMBOLISM PROTOCOL) CLINICAL HISTORY:   PE   I50.43 Acute on chronic combined systolic and diastolic CHF (congestive heart failure) ([facility]) ICD10. Technique:  Spiral axial CT acquisition of the chest from the thoracic inlet to the upper abdomen following IV contrast.  2-D images were reconstructed in the sagittal and coronal planes. 3-D MIPS images were generated in the coronal and axial planes. Images were reviewed on the PACS workstation. All images including the 3-D MIPS were personally archived. Contrast:  IV administration of 100 ml Isovue 300 All CT scans at this facility use dose modulation, iterative reconstruction, and/or weight based dosing when appropriate to reduce radiation dose to as low as reasonably achievable. Comparison:  CTA chest 3/15/2019  RESULT: Limitations: Motion artifact.  Evaluation for thromboembolic disease:      - Right heart chambers:  No thromboembolic disease.      - Main pulmonary arteries:  No thromboembolic disease.      - Lobar pulmonary arteries:  No thromboembolic disease.        - Segmental pulmonary arteries:  No thromboembolic disease.        - Subsegmental pulmonary arteries:  Not well visualized due to motion. Lines, tubes, and devices:  None. Lung parenchyma and pleura: Tortuous course of the trachea. Central airways appear grossly patent. Moderate left and small right pleural effusions with associated atelectasis. Limitations in evaluation of the lung parenchyma secondary to motion. Other areas of probable scarring/atelectasis. No pneumothorax. Thoracic inlet, heart, and mediastinum: Visualized thyroid unremarkable. No axillary, mediastinal, or hilar lymphadenopathy. Ascending thoracic aorta aneurysmal, measuring around 5.0 cm, similar to prior. Descending thoracic aorta aneurysmal and measures  up to 4.8 cm, also similar to prior. Normal pulmonary artery size. Cardiomegaly, similar to prior Scattered coronary artery calcifications. Small pericardial effusion. Small hiatal hernia. Bones and soft tissues:  No destructive bone lesion or acute osseous findings. Osteopenia. Scoliosis and kyphosis and multilevel degenerative changes, grossly similar to prior. Chest wall unremarkable. Upper abdomen:  No acute abnormality in the imaged upper abdomen. Reflux of contrast into the hepatic veins, associated with right heart failure. Lower neck: Unremarkable. No CT evidence of pulmonary embolism. Moderate left and small right pleural effusions. Cardiomegaly and small pericardial effusion. Reflux of contrast into the hepatic veins, associated with right heart failure. 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 No lung consolidative opacity. Areas of probable atelectasis/scarring.      Xr Chest Portable    Result Date: 1/26/2021  EXAMINATION: Portable AP ERECT view of the chest. CLINICAL HISTORY:  SOB , Negative Covid-19 test. DATE: 1/25/2021 5:41 PM COMPARISONS: 7/24/2017 FINDINGS: The heart is moderately enlarged, similar to prior exam.  Prominent interstitial markings, consistent with increasing Central vascular congestion. The costophrenic angles are blunted secondary to pleural effusions bilaterally, left greater than  right. No pneumothorax. There are infiltrates/atelectasis within lung bases, left greater than right. There are moderate degenerative changes in spine. CARDIAC ENLARGEMENT WITH CENTRAL VESSEL CONGESTION AND BILATERAL PLEURAL EFFUSIONS, POSSIBLE CONGESTIVE HEART FAILURE. BIBASILAR INFILTRATES/ATELECTASIS, LEFT GREATER THAN RIGHT. Previous extensive, complex labs, notes and diagnostics reviewed and analyzed. ALLERGIES:    Allergies as of 01/25/2021 - Review Complete 01/25/2021   Allergen Reaction Noted    Latex  07/19/2017    Tape [adhesive tape]  06/28/2016      (please also verify by checking [facility])     Complex Physical Medicine & Rehab Issues Assess & Plan:   1. Severe abnormality of gait and mobility and impaired self-care and ADL's secondary to progressive Cardiac rehab CHF exacerbation . Functional and medical status reassessed regarding patients ability to participate in therapies and patient found to be able to participate in acute intensive comprehensive inpatient rehabilitation program including PT/OT to improve balance, ambulation, ADLs, and to improve the P/AROM. Therapeutic modifications regarding activities in therapies, place, amount of time per day and intensity of therapy made daily. In bed therapies or bedside therapies prn.   2. Bowel and Bladder dysfunction  :  frequent toileting, ambulate to bathroom with assistance, check post void residuals. Check for C.difficile x1 if >2 loose stools in 24 hours, continue bowel & bladder program.  Monitor bowel and bladder function. Lactinex 2 PO every AC. MOM prn, Brown Bomb prn, Glycerin suppository prn, enema prn.   3. Moderate   pain as well as generalized OA pain: reassess pain every shift and prior to and after each therapy session, give prn Tylenol and   , modalities prn in therapy, masage, Lidoderm, K-pad prn. Consider scheduled AM pain meds. 4. Skin healing and breakdown risk:  continue pressure relief program.  Daily skin exams and reports from nursing. 5. Severe fatigue due to nutritional and hydration deficiency: Add and titrate vitamin B12 vitamin D and CoQ10 continue to monitor I&Os, calorie counts prn, dietary consult prn.  6. Acute episodic insomnia with situational adjustment disorder:  prn Ambien, monitor for day time sedation. 7. Falls risk elevated:  patient to use call light to get nursing assistance to get up, bed and chair alarm. 8. Elevated DVT risk: progressive activities in PT, continue prophylaxis PORTILLO hose, elevation and  see mar . 9. Complex discharge planning:  Weekly team meeting every Monday Thursday to assess progress towards goals, discuss and address social, psychological and medical comorbidities and to address difficulties they may be having progressing in therapy. Patient and family education is in progress. The patient is to follow-up with their family physician after discharge.         Complex Active General Medical Issues that complicate care Assess & Plan:    Patient Active Problem List   Diagnosis    Lumbar stenosis with neurogenic claudication    Acquired scoliosis    Osteoporosis    Charcot Arleen Tooth muscular atrophy    Excess weight    Osteoarthritis of lumbar spine with myelopathy    Osteoarthritis    Myelopathy ([facility] [address]    Impaired mobility and activities of daily living due to [employer], severe lumbar canal stenosis s/p Rt and Rui L3-4, L4-5 microdissection and decompression, [facility]    Headache    Depression    Ascending aortic aneurysm (HCC)    Descending aortic aneurysm (HCC)    Dizziness    Shortness of breath    Essential hypertension    Acute on chronic combined systolic and diastolic CHF (congestive heart failure) (HCC)    Gait abnormality   1. appropriate for rehab once medically cleared  Mendel Crystal MD Heather R. Ever Hutchins D.O., PM&R     Attending    [address]

## 2021-01-31 NOTE — PROGRESS NOTES
INPATIENT PROGRESS NOTES    PATIENT NAME: Amanda Mohr  MRN: [id number]  SERVICE DATE:  January 31, 2021   SERVICE TIME:  2:13 PM      PRIMARY SERVICE: Pulmonary Disease    CHIEF COMPLAIN: Shortness of breath and chest pain      INTERVAL HPI: Patient seen and examined at bedside, Interval Notes, orders reviewed. Nursing notes noted  Patient is still having short of breath. Having some pain taking deep breath. Feels tired. On 4 L O2 via nasal cannula O2 saturation 95%. She is going for cardiac cath Monday. No fever or chills. No nausea vomiting diarrhea. OBJECTIVE    Body mass index is 27.52 kg/m². PHYSICAL EXAM:  Vitals:  BP (!) 87/44   Pulse 73   Temp 98.6 °F (37 °C)   Resp 16   Ht 5' 4\" (1.626 m)   Wt 160 lb 4.8 oz (72.7 kg)   SpO2 94%   BMI 27.52 kg/m²   General: Alert, awake . comfortable in bed, No distress. Head: Atraumatic , Normocephalic   Eyes: PERRL. No sclera icterus. No conjunctival injection. No discharge   ENT: No nasal  discharge. Pharynx clear. lips, teeth, mucosa and gums are normal, tongue protrudes in the midline  Neck:  Trachea midline. No thyromegaly, no JVD, No cervical adenopathy. Chest : Bilaterally symmetrical ,Normal effort,  No accessory muscle use  Lung : . Fair BS bilateral, decreased BS at bases. No Rales. No wheezing. No rhonchi. Heart[de-identified] Normal  rate. Regular rhythm. No mumur ,  Rub or gallop  ABD: Non-tender. Non-distended. No masses. No organmegaly. Normal bowel sounds. No hernia.   Ext : No Pitting both leg , No Cyanosis No clubbing  Neuro: no focal weakness    DATA:   Recent Labs     01/30/21  0457 01/31/21  0550   WBC 8.5 9.9   HGB 11.2* 11.9*   HCT 34.8* 36.9*   MCV 89.1 89.1    336     Recent Labs     01/30/21  0457 01/31/21  0550   * 142   K 3.8 3.8   * 105   CO2 26 28   BUN 24* 23   CREATININE 0.70 0.64   GLUCOSE 100* 108*   CALCIUM 8.5 8.7   LABGLOM >60.0 >60.0   GFRAA >60.0 >60.0       MV Settings:          No results for input(s): PHART, MZP9GZU, PO2ART, SHD7PWP, BEART, X8OHGPXZ in the last 72 hours. O2 Device: Nasal cannula  O2 Flow Rate (L/min): 4 L/min    DIET LOW SODIUM 2 GM;     MEDICATIONS during current hospitalization:    Continuous Infusions:    Scheduled Meds:   predniSONE  50 mg Oral Once    diphenhydrAMINE  50 mg Oral Once    hydrocortisone sodium succinate PF  200 mg Intravenous Once    losartan  50 mg Oral Daily    furosemide  20 mg Oral Daily    carvedilol  3.125 mg Oral BID    enoxaparin  1 mg/kg Subcutaneous Daily    aspirin  81 mg Oral Daily    citalopram  20 mg Oral Daily    dilTIAZem  120 mg Oral BID    potassium chloride  20 mEq Oral Daily with breakfast    levothyroxine  88 mcg Oral Daily    sodium chloride flush  10 mL Intravenous 2 times per day       PRN Meds:LORazepam, sodium chloride flush, sodium chloride flush, promethazine **OR** ondansetron, acetaminophen **OR** acetaminophen, polyethylene glycol    Radiology  Echo Complete 2d W Doppler W Color    Result Date: 1/27/2021  Transthoracic Echocardiography Report (TTE)  Demographics  Patient Name   Yeimy Adams        Gender              Female                 Lavelle Guerra  Patient Number [id number]          Race                                                   Ethnicity  Visit Number   [id number]         Room Number         W560  Corporate ID                     Date of Study       01/27/2021  Accession      [id number]        Referring Physician  Number  Date of Birth  07/12/1932        Sonographer         Chloe STRONG  Age            80 year(s)        Interpreting        [facility])                                   Physician           Cardiology                                                       Tino Kay MD Procedure Type of Study  TTE procedure:ECHO COMPLETE 2D W/DOP W/COLOR. Procedure Date Date: 01/27/2021 Start: 10:26 AM Study Location: Portable Technical Quality: Adequate visualization Indications:Congestive heart failure. Patient Status: Routine Height: 64 inches Weight: 165 pounds BSA: 1.8 m^2 BMI: 28.32 kg/m^2 BP: 108/77 mmHg  Conclusions  Summary  Mitral annular calcification is present. 1+ MR  Left ventricular ejection fraction is visually estimated at 40%. E/A flow reversal noted. Suggestive of diastolic dysfunction. Signature  ----------------------------------------------------------------  Electronically signed by Poornima العراقي MD(Interpreting  physician) on 01/27/2021 11:37 AM  ----------------------------------------------------------------  Findings Left Ventricle Left ventricular ejection fraction is visually estimated at 40%. E/A flow reversal noted. Suggestive of diastolic dysfunction. Right Ventricle Normal right ventricle structure and function. Normal right ventricle systolic pressure. Left Atrium Moderately dilated left atrium. Right Atrium Moderately enlarged right atrium size. Mitral Valve Mitral annular calcification is present. 1+ MR Tricuspid Valve Normal tricuspid valve structure and function. 1-2+ TR RVSP 38 mmHg Aortic Valve Aortic valve leaflets are moderately thickened. No AS No AR Pulmonic Valve The pulmonic valve was not well visualized . Pericardial Effusion No evidence of pericardial effusion. Pleural Effusion No evidence of pleural effusion. Aorta \ Miscellaneous The aorta is within normal limits. M-Mode Measurements (cm)  LVIDd: 4.39 cm                         LVIDs: 3.55 cm  IVSd: 1.43 cm                          IVSs: 1.55 cm  LVPWd: 1.5 cm                          LVPWs: 1.48 cm  Rt. Vent.  Dimension: 3.74 cm           AO Root Dimension: 3.62 cm                                         ACS: 1.25 cm                                         LA: 4.54 cm                                         LVOT: 2.04 cm Doppler Measurements:  AV Velocity:0.02 m/s                    MV Peak E-Wave: 0.85 m/s  AV Peak Gradient: 10.44 mmHg            MV Peak A-Wave: 0.87 m/s  AV Mean Gradient: 6.01 mmHg  AV Area (Continuity):1.83 cm^2  TR Velocity:2.62 m/s                    Estimated RAP:10 mmHg  TR Gradient:27.54 mmHg                  RVSP:37.54 mmHg Valves  Mitral Valve  Peak E-Wave: 0.85 m/s                 Peak A-Wave: 0.87 m/s                                        E/A Ratio: 0.97                                        Peak Gradient: 2.88 mmHg                                        Deceleration Time: 232.8 msec  Tissue Doppler  E' Septal Velocity: 0.07 m/s  E' Lateral Velocity: 0.08 m/s  Aortic Valve  Peak Velocity: 1.62 m/s                Mean Velocity: 1.15 m/s  Peak Gradient: 10.44 mmHg              Mean Gradient: 6.01 mmHg  Area (continuity): 1.83 cm^2           Area (2D): 1.8 cm^2  AV VTI: 28.8 cm  Cusp Separation: 1.25 cm  Tricuspid Valve  Estimated RVSP: 37.54 mmHg              Estimated RAP: 10 mmHg  TR Velocity: 2.62 m/s                   TR Gradient: 27.54 mmHg  Pulmonic Valve  Peak Velocity: 0.96 m/s           Peak Gradient: 3.66 mmHg                                    Estimated PASP: 37.54 mmHg  LVOT  Peak Velocity: 0.89 m/s              Mean Velocity: 0.57 m/s  Peak Gradient: 3.16 mmHg             Mean Gradient: 1.58 mmHg  LVOT Diameter: 2.04 cm               LVOT VTI: 16.15 cm Structures  Left Atrium  LA Dimension: 4.54 cm                        LA Area: 11.81 cm^2  LA/Aorta: 1.25  LA Volume/Index: 44.64 ml /25 m^2  Left Ventricle  Diastolic Dimension: 2.63 cm         Systolic Dimension: 8.77 cm  Septum Diastolic: 1.90 cm            Septum Systolic: 7.91 cm  PW Diastolic: 1.5 cm                 PW Systolic: 3.44 cm                                       FS: 19.1 %  LV EDV/LV EDV Index: 87.32 ml/49 m^2 LV ESV/LV ESV Index: 52.68 ml/29 m^2  EF Calculated: 39.7 %                LV Length: 6.12 cm  LVOT Diameter: 2.04 cm  Right Atrium  RA Systolic Pressure: 10 mmHg  Right Ventricle  Diastolic Dimension: 0.23 cm                                    RV Systolic Pressure: 52.93 mmHg Aorta/ Miscellaneous Aorta Aortic Root: 3.62 cm  LVOT Diameter: 2.04 cm    Xr Chest (2 Vw)    1. No evidence of pneumothorax. Resolution of left pleural effusion. Cardiac silhouette remains enlarged. Left lower lobe hazy opacity may represent atelectasis versus pneumonia or infiltrate. COMPARISON: No prior studies available for comparison. DIAGNOSIS:  Post left thoracentesis. Dr. Bullard Gal to read. COMMENTS: I50.43 Acute on chronic combined systolic and diastolic CHF (congestive heart failure) (HCC) ICD10 TECHNIQUE: XR CHEST (2 VW) FINDINGS: Left lower lobe opacity may represent atelectasis versus pneumonia or infiltrate. Interval resolution of left pleural effusion. No evidence of pneumothorax. Cardiac silhouette is enlarged. Visualized soft tissues, and osseous structures are unremarkable. Cta Chest W Wo Contrast    Result Date: 1/26/2021  EXAMINATION:  CHEST CTA WITH CONTRAST (PULMONARY EMBOLISM PROTOCOL) CLINICAL HISTORY:   PE   I50.43 Acute on chronic combined systolic and diastolic CHF (congestive heart failure) ([address] ICD10. Technique:  Spiral axial CT acquisition of the chest from the thoracic inlet to the upper abdomen following IV contrast.  2-D images were reconstructed in the sagittal and coronal planes. 3-D MIPS images were generated in the coronal and axial planes. Images were reviewed on the PACS workstation. All images including the 3-D MIPS were personally archived. Contrast:  IV administration of 100 ml Isovue 300 All CT scans at this facility use dose modulation, iterative reconstruction, and/or weight based dosing when appropriate to reduce radiation dose to as low as reasonably achievable. Comparison:  CTA chest 3/15/2019  RESULT: Limitations: Motion artifact.  Evaluation for thromboembolic disease:      - Right heart chambers:  No thromboembolic disease.      - Main pulmonary arteries:  No thromboembolic disease.      - Lobar pulmonary arteries:  No thromboembolic disease.        - Segmental pulmonary arteries:  No thromboembolic disease.        - Subsegmental pulmonary arteries:  Not well visualized due to motion. Lines, tubes, and devices:  None. Lung parenchyma and pleura: Tortuous course of the trachea. Central airways appear grossly patent. Moderate left and small right pleural effusions with associated atelectasis. Limitations in evaluation of the lung parenchyma secondary to motion. Other areas of probable scarring/atelectasis. No pneumothorax. Thoracic inlet, heart, and mediastinum: Visualized thyroid unremarkable. No axillary, mediastinal, or hilar lymphadenopathy. Ascending thoracic aorta aneurysmal, measuring around 5.0 cm, similar to prior. Descending thoracic aorta aneurysmal and measures  up to 4.8 cm, also similar to prior. Normal pulmonary artery size. Cardiomegaly, similar to prior Scattered coronary artery calcifications. Small pericardial effusion. Small hiatal hernia. Bones and soft tissues:  No destructive bone lesion or acute osseous findings. Osteopenia. Scoliosis and kyphosis and multilevel degenerative changes, grossly similar to prior. Chest wall unremarkable. Upper abdomen:  No acute abnormality in the imaged upper abdomen. Reflux of contrast into the hepatic veins, associated with right heart failure. Lower neck: Unremarkable. No CT evidence of pulmonary embolism. Moderate left and small right pleural effusions. Cardiomegaly and small pericardial effusion. Reflux of contrast into the hepatic veins, associated with right heart failure. 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 No lung consolidative opacity. Areas of probable atelectasis/scarring. Xr Chest Portable    Result Date: 1/28/2021  EXAMINATION: CHEST PORTABLE VIEW  CLINICAL HISTORY: Short of breath COMPARISONS: None  FINDINGS: Single  views of the chest is submitted. The cardiac silhouette is enlarged. Pulmonary vascular unremarkable.  Right sided

## 2021-01-31 NOTE — PROGRESS NOTES
Progress Note  Patient: Sara Pod  Unit/Bed: B578/R765-16  YOB: 1932  MRN: [id number]  Acct: [de-identified]   Admitting Diagnosis: Acute on chronic combined systolic and diastolic CHF (congestive heart failure) ([facility] [I50.43]  Admit Date:  1/25/2021  Hospital Day: 6    Chief Complaint: CMP CHF HTN     Histories:  Past Medical History:   Diagnosis Date    Ascending aortic aneurysm ([facility] 6/23/2017    Charcot Arleen Tooth muscular atrophy     Depression     Descending aortic aneurysm ([facility] 6/23/2017    Essential hypertension 2/16/2018    Headache     HTN (hypertension)     Impaired mobility and activities of daily living     Lumbar stenosis with neurogenic claudication     Myelopathy ([facility]     Osteoarthritis      Past Surgical History:   Procedure Laterality Date    JOINT REPLACEMENT      THORACENTESIS Left 01/29/2021    total of 755 cc removed per Dr Bullard Gal specimen sent to lab     Family History   Problem Relation Age of Onset    Arthritis Mother     Arthritis Father     High Blood Pressure Father      Social History     Socioeconomic History    Marital status:       Spouse name: None    Number of children: None    Years of education: None    Highest education level: None   Occupational History    None   Social Needs    Financial resource strain: None    Food insecurity     Worry: None     Inability: None    Transportation needs     Medical: None     Non-medical: None   Tobacco Use    Smoking status: Never Smoker    Smokeless tobacco: Never Used   Substance and Sexual Activity    Alcohol use: No     Alcohol/week: 0.0 standard drinks    Drug use: No    Sexual activity: None   Lifestyle    Physical activity     Days per week: None     Minutes per session: None    Stress: None   Relationships    Social connections     Talks on phone: None     Gets together: None     Attends [employer] service: None     Active member of club or organization: None     Attends meetings of clubs or organizations: None     Relationship status:     Intimate partner violence     Fear of current or ex partner: No     Emotionally abused: No     Physically abused: No     Forced sexual activity: No   Other Topics Concern    None   Social History Narrative    Lives With: Alone    Has a son in the area    [place] of Home[place] [address]: One level    Home Access: Stairs to enter with rails- Number of Steps: 2- Rails: Both    Bathroom Shower/Tub: Tub/Shower unit, Bathroom Equipment: Grab bars in shower, Shower chair    Home Equipment: Rolling walker, Cane(Pt infrequemtly uses DME for ambulation and prefers to furniture walk in home)    ADL Assistance: Independent, [facility] Responsibilities: Yes    Ambulation Assistance: Independent, Transfer Assistance: Independent    Additional Comments: Son stops over 2 times daily           Subjective/HPI c/o pain at Steptoe process. lying flat and appears comfortable. No acute events overnight. No fever. Eating ok. Overall feels better. Diuresed >3L since admit. EKG: SR 66        Review of Systems:   Review of Systems   Constitutional: Negative. Negative for diaphoresis and fatigue. HENT: Negative. Eyes: Negative. Respiratory: Negative. Negative for cough, chest tightness, shortness of breath, wheezing and stridor. Cardiovascular: Negative. Negative for chest pain, palpitations and leg swelling. Gastrointestinal: Negative. Negative for blood in stool and nausea. Genitourinary: Negative. Musculoskeletal: Negative. Skin: Negative. Neurological: Negative. Negative for dizziness, syncope, weakness and light-headedness. Hematological: Negative. Psychiatric/Behavioral: Negative.           Physical Examination:    /75   Pulse 64   Temp 98.3 °F (36.8 °C) (Oral)   Resp 16   Ht 5' 4\" (1.626 m)   Wt 160 lb 4.8 oz (72.7 kg)   SpO2 95%   BMI 27.52 kg/m²    Physical Exam   Constitutional: She appears healthy. No distress. HENT:   Normal cephalic and Atraumatic   Eyes: Pupils are equal, round, and reactive to light. Neck: Normal range of motion and thyroid normal. Neck supple. No JVD present. No neck adenopathy. No thyromegaly present. Cardiovascular: Normal rate, regular rhythm, intact distal pulses and normal pulses. Murmur heard. Pulmonary/Chest: Effort normal and breath sounds normal. She has no wheezes. She has no rales. She exhibits no tenderness. Abdominal: Soft. Bowel sounds are normal. There is no abdominal tenderness. Musculoskeletal: Normal range of motion. General: No tenderness or edema. Neurological: She is alert and oriented to person, place, and time. Skin: Skin is warm. No cyanosis. Nails show no clubbing.        LABS:  CBC:   Lab Results   Component Value Date    WBC 9.9 01/31/2021    RBC 4.14 01/31/2021    HGB 11.9 01/31/2021    HCT 36.9 01/31/2021    MCV 89.1 01/31/2021    MCH 28.8 01/31/2021    MCHC 32.3 01/31/2021    RDW 14.0 01/31/2021     01/31/2021     CBC with Differential:    Lab Results   Component Value Date    WBC 9.9 01/31/2021    RBC 4.14 01/31/2021    HGB 11.9 01/31/2021    HCT 36.9 01/31/2021     01/31/2021    MCV 89.1 01/31/2021    MCH 28.8 01/31/2021    MCHC 32.3 01/31/2021    RDW 14.0 01/31/2021    LYMPHOPCT 9.0 01/25/2021    MONOPCT 8.7 01/25/2021    BASOPCT 0.4 01/25/2021    MONOSABS 1.0 01/25/2021    LYMPHSABS 1.0 01/25/2021    EOSABS 0.1 01/25/2021    BASOSABS 0.0 01/25/2021     CMP:    Lab Results   Component Value Date     01/31/2021    K 3.8 01/31/2021    K 4.1 01/25/2021     01/31/2021    CO2 28 01/31/2021    BUN 23 01/31/2021    CREATININE 0.64 01/31/2021    GFRAA >60.0 01/31/2021    LABGLOM >60.0 01/31/2021    GLUCOSE 108 01/31/2021    PROT 7.3 01/25/2021    LABALBU 3.5 01/25/2021    CALCIUM 8.7 01/31/2021    BILITOT 0.4 01/25/2021    ALKPHOS 57 01/25/2021 AST 23 01/25/2021    ALT 20 01/25/2021     BMP:    Lab Results   Component Value Date     01/31/2021    K 3.8 01/31/2021    K 4.1 01/25/2021     01/31/2021    CO2 28 01/31/2021    BUN 23 01/31/2021    LABALBU 3.5 01/25/2021    CREATININE 0.64 01/31/2021    CALCIUM 8.7 01/31/2021    GFRAA >60.0 01/31/2021    LABGLOM >60.0 01/31/2021    GLUCOSE 108 01/31/2021     Magnesium:    Lab Results   Component Value Date    MG 1.9 10/02/2020     Troponin:    Lab Results   Component Value Date    TROPONINI <0.010 01/26/2021        Active Hospital Problems    Diagnosis Date Noted    Lumbar stenosis with neurogenic claudication [M48.062] 06/02/2016     Priority: Medium    Gait abnormality [R26.9] 01/27/2021     Priority: Low    Acute on chronic combined systolic and diastolic CHF (congestive heart failure) ([facility] [I50.43] 01/25/2021     Priority: Low    Essential hypertension [I10] 02/16/2018     Priority: Low    Depression [F32.9]      Priority: Low    Charcot Arleen Tooth muscular atrophy [G60.0] 06/02/2016     Priority: Low        Assessment/Plan:  1. Combined CHF- improving. Continue regimen. 2. CMP - EF 40% -Dr. Bobbi Sapp - scheduled for 2pm. She can have breakfast tomorrow AM then NPO- discussed with RN. 3. PAF- remains in SR. Rate control and Lovenox. 4. HTN stable   5. Pleural effusion s/p Thoracentesis  6. Prior SVT s/p Ablation - stable none seen. 7. Stable Thoracic Ao Aneurysm.         Electronically signed by Bob Otero MD on 1/31/2021 at 8:57 AM

## 2021-01-31 NOTE — PROGRESS NOTES
INPATIENT PROGRESS NOTES    PATIENT NAME: Waleska Chandler  MRN: [id number]  SERVICE DATE:  January 30, 2021   SERVICE TIME:  7:50 PM      PRIMARY SERVICE: Pulmonary Disease    CHIEF COMPLAIN: Shortness of breath and chest pain      INTERVAL HPI: Patient seen and examined at bedside, Interval Notes, orders reviewed. Nursing notes noted  Patient is still having short of breath. Having some pain taking deep breath. Feels tired. On 4 L O2 via nasal cannula O2 saturation 95%. She is going for cardiac cath Monday. No fever or chills. No nausea vomiting diarrhea. OBJECTIVE    Body mass index is 27.52 kg/m². PHYSICAL EXAM:  Vitals:  /75   Pulse 64   Temp 97.2 °F (36.2 °C) (Oral)   Resp 18   Ht 5' 4\" (1.626 m)   Wt 160 lb 4.8 oz (72.7 kg)   SpO2 95%   BMI 27.52 kg/m²   General: Alert, awake . comfortable in bed, No distress. Head: Atraumatic , Normocephalic   Eyes: PERRL. No sclera icterus. No conjunctival injection. No discharge   ENT: No nasal  discharge. Pharynx clear. lips, teeth, mucosa and gums are normal, tongue protrudes in the midline  Neck:  Trachea midline. No thyromegaly, no JVD, No cervical adenopathy. Chest : Bilaterally symmetrical ,Normal effort,  No accessory muscle use  Lung : . Fair BS bilateral, decreased BS at bases. No Rales. No wheezing. No rhonchi. Heart[de-identified] Normal  rate. Regular rhythm. No mumur ,  Rub or gallop  ABD: Non-tender. Non-distended. No masses. No organmegaly. Normal bowel sounds. No hernia.   Ext : No Pitting both leg , No Cyanosis No clubbing  Neuro: no focal weakness    DATA:   Recent Labs     01/29/21  0535 01/30/21  0457   WBC 9.2 8.5   HGB 11.4* 11.2*   HCT 35.4* 34.8*   MCV 89.3 89.1    285     Recent Labs     01/29/21  0535 01/30/21  0457    146*   K 3.5 3.8   * 109*   CO2 26 26   BUN 22 24*   CREATININE 0.56 0.70   GLUCOSE 100* 100*   CALCIUM 8.5 8.5   LABGLOM >60.0 >60.0   GFRAA >60.0 >60.0       MV Settings:          No results for input(s): PHART, HDY3QED, PO2ART, JMM4KTA, BEART, X6AZGCGY in the last 72 hours. O2 Device: Nasal cannula  O2 Flow Rate (L/min): 4 L/min    DIET LOW SODIUM 2 GM;     MEDICATIONS during current hospitalization:    Continuous Infusions:    Scheduled Meds:   predniSONE  50 mg Oral Once    diphenhydrAMINE  50 mg Oral Once    hydrocortisone sodium succinate PF  200 mg Intravenous Once    losartan  50 mg Oral Daily    furosemide  20 mg Oral Daily    carvedilol  3.125 mg Oral BID    enoxaparin  1 mg/kg Subcutaneous Daily    aspirin  81 mg Oral Daily    citalopram  20 mg Oral Daily    dilTIAZem  120 mg Oral BID    potassium chloride  20 mEq Oral Daily with breakfast    levothyroxine  88 mcg Oral Daily    sodium chloride flush  10 mL Intravenous 2 times per day       PRN Meds:LORazepam, sodium chloride flush, sodium chloride flush, promethazine **OR** ondansetron, acetaminophen **OR** acetaminophen, polyethylene glycol    Radiology  Echo Complete 2d W Doppler W Color    Result Date: 1/27/2021  Transthoracic Echocardiography Report (TTE)  Demographics  Patient Name   Willi Valencia        Gender              Female                 Vanesa Enrique  Patient Number [id number]          Race                                                   Ethnicity  Visit Number   [id number]         Room Number         W520  Corporate ID                     Date of Study       01/27/2021  Accession      [id number]        Referring Physician  Number  Date of Birth  07/12/1932        Sonographer         Alec STRONG  Age            80 year(s)        Interpreting        [facility])                                   Physician           Cardiology                                                       Danna Quinones MD Procedure Type of Study  TTE procedure:ECHO COMPLETE 2D W/DOP W/COLOR. Procedure Date Date: 01/27/2021 Start: 10:26 AM Study Location: Portable Technical Quality: Adequate visualization Indications:Congestive heart failure. Patient Status: Routine Height: 64 inches Weight: 165 pounds BSA: 1.8 m^2 BMI: 28.32 kg/m^2 BP: 108/77 mmHg  Conclusions  Summary  Mitral annular calcification is present. 1+ MR  Left ventricular ejection fraction is visually estimated at 40%. E/A flow reversal noted. Suggestive of diastolic dysfunction. Signature  ----------------------------------------------------------------  Electronically signed by Marivel Almanzar MD(Interpreting  physician) on 01/27/2021 11:37 AM  ----------------------------------------------------------------  Findings Left Ventricle Left ventricular ejection fraction is visually estimated at 40%. E/A flow reversal noted. Suggestive of diastolic dysfunction. Right Ventricle Normal right ventricle structure and function. Normal right ventricle systolic pressure. Left Atrium Moderately dilated left atrium. Right Atrium Moderately enlarged right atrium size. Mitral Valve Mitral annular calcification is present. 1+ MR Tricuspid Valve Normal tricuspid valve structure and function. 1-2+ TR RVSP 38 mmHg Aortic Valve Aortic valve leaflets are moderately thickened. No AS No AR Pulmonic Valve The pulmonic valve was not well visualized . Pericardial Effusion No evidence of pericardial effusion. Pleural Effusion No evidence of pleural effusion. Aorta \ Miscellaneous The aorta is within normal limits. M-Mode Measurements (cm)  LVIDd: 4.39 cm                         LVIDs: 3.55 cm  IVSd: 1.43 cm                          IVSs: 1.55 cm  LVPWd: 1.5 cm                          LVPWs: 1.48 cm  Rt. Vent.  Dimension: 3.74 cm           AO Root Dimension: 3.62 cm                                         ACS: 1.25 cm                                         LA: 4.54 cm                                         LVOT: 2.04 cm Doppler Measurements:  AV Velocity:0.02 m/s                    MV Peak E-Wave: 0.85 m/s  AV Peak Gradient: 10.44 mmHg            MV Peak A-Wave: 0.87 m/s  AV Mean Gradient: 6.01 mmHg  AV Area (Continuity):1.83 cm^2  TR Velocity:2.62 m/s                    Estimated RAP:10 mmHg  TR Gradient:27.54 mmHg                  RVSP:37.54 mmHg Valves  Mitral Valve  Peak E-Wave: 0.85 m/s                 Peak A-Wave: 0.87 m/s                                        E/A Ratio: 0.97                                        Peak Gradient: 2.88 mmHg                                        Deceleration Time: 232.8 msec  Tissue Doppler  E' Septal Velocity: 0.07 m/s  E' Lateral Velocity: 0.08 m/s  Aortic Valve  Peak Velocity: 1.62 m/s                Mean Velocity: 1.15 m/s  Peak Gradient: 10.44 mmHg              Mean Gradient: 6.01 mmHg  Area (continuity): 1.83 cm^2           Area (2D): 1.8 cm^2  AV VTI: 28.8 cm  Cusp Separation: 1.25 cm  Tricuspid Valve  Estimated RVSP: 37.54 mmHg              Estimated RAP: 10 mmHg  TR Velocity: 2.62 m/s                   TR Gradient: 27.54 mmHg  Pulmonic Valve  Peak Velocity: 0.96 m/s           Peak Gradient: 3.66 mmHg                                    Estimated PASP: 37.54 mmHg  LVOT  Peak Velocity: 0.89 m/s              Mean Velocity: 0.57 m/s  Peak Gradient: 3.16 mmHg             Mean Gradient: 1.58 mmHg  LVOT Diameter: 2.04 cm               LVOT VTI: 16.15 cm Structures  Left Atrium  LA Dimension: 4.54 cm                        LA Area: 11.81 cm^2  LA/Aorta: 1.25  LA Volume/Index: 44.64 ml /25 m^2  Left Ventricle  Diastolic Dimension: 9.86 cm         Systolic Dimension: 8.47 cm  Septum Diastolic: 1.83 cm            Septum Systolic: 7.73 cm  PW Diastolic: 1.5 cm                 PW Systolic: 0.93 cm                                       FS: 19.1 %  LV EDV/LV EDV Index: 87.32 ml/49 m^2 LV ESV/LV ESV Index: 52.68 ml/29 m^2  EF Calculated: 39.7 %                LV Length: 6.12 cm  LVOT Diameter: 2.04 cm  Right Atrium  RA Systolic Pressure: 10 mmHg  Right Ventricle  Diastolic Dimension: 6.73 cm                                    RV Systolic Pressure: 46.79 mmHg Aorta/ Miscellaneous Aorta Aortic Root: 3.62 cm  LVOT Diameter: 2.04 cm    Xr Chest (2 Vw)    1. No evidence of pneumothorax. Resolution of left pleural effusion. Cardiac silhouette remains enlarged. Left lower lobe hazy opacity may represent atelectasis versus pneumonia or infiltrate. COMPARISON: No prior studies available for comparison. DIAGNOSIS:  Post left thoracentesis. Dr. Db Cope to read. COMMENTS: I50.43 Acute on chronic combined systolic and diastolic CHF (congestive heart failure) (HCC) ICD10 TECHNIQUE: XR CHEST (2 VW) FINDINGS: Left lower lobe opacity may represent atelectasis versus pneumonia or infiltrate. Interval resolution of left pleural effusion. No evidence of pneumothorax. Cardiac silhouette is enlarged. Visualized soft tissues, and osseous structures are unremarkable. Cta Chest W Wo Contrast    Result Date: 1/26/2021  EXAMINATION:  CHEST CTA WITH CONTRAST (PULMONARY EMBOLISM PROTOCOL) CLINICAL HISTORY:   PE   I50.43 Acute on chronic combined systolic and diastolic CHF (congestive heart failure) ([address] ICD10. Technique:  Spiral axial CT acquisition of the chest from the thoracic inlet to the upper abdomen following IV contrast.  2-D images were reconstructed in the sagittal and coronal planes. 3-D MIPS images were generated in the coronal and axial planes. Images were reviewed on the PACS workstation. All images including the 3-D MIPS were personally archived. Contrast:  IV administration of 100 ml Isovue 300 All CT scans at this facility use dose modulation, iterative reconstruction, and/or weight based dosing when appropriate to reduce radiation dose to as low as reasonably achievable. Comparison:  CTA chest 3/15/2019  RESULT: Limitations: Motion artifact.  Evaluation for thromboembolic disease:      - Right heart chambers:  No thromboembolic disease.      - Main pulmonary arteries:  No thromboembolic disease.      - Lobar pulmonary arteries:  No thromboembolic disease.        - Segmental pulmonary arteries:  No thromboembolic disease.        - Subsegmental pulmonary arteries:  Not well visualized due to motion. Lines, tubes, and devices:  None. Lung parenchyma and pleura: Tortuous course of the trachea. Central airways appear grossly patent. Moderate left and small right pleural effusions with associated atelectasis. Limitations in evaluation of the lung parenchyma secondary to motion. Other areas of probable scarring/atelectasis. No pneumothorax. Thoracic inlet, heart, and mediastinum: Visualized thyroid unremarkable. No axillary, mediastinal, or hilar lymphadenopathy. Ascending thoracic aorta aneurysmal, measuring around 5.0 cm, similar to prior. Descending thoracic aorta aneurysmal and measures  up to 4.8 cm, also similar to prior. Normal pulmonary artery size. Cardiomegaly, similar to prior Scattered coronary artery calcifications. Small pericardial effusion. Small hiatal hernia. Bones and soft tissues:  No destructive bone lesion or acute osseous findings. Osteopenia. Scoliosis and kyphosis and multilevel degenerative changes, grossly similar to prior. Chest wall unremarkable. Upper abdomen:  No acute abnormality in the imaged upper abdomen. Reflux of contrast into the hepatic veins, associated with right heart failure. Lower neck: Unremarkable. No CT evidence of pulmonary embolism. Moderate left and small right pleural effusions. Cardiomegaly and small pericardial effusion. Reflux of contrast into the hepatic veins, associated with right heart failure. 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 No lung consolidative opacity. Areas of probable atelectasis/scarring. Xr Chest Portable    Result Date: 1/28/2021  EXAMINATION: CHEST PORTABLE VIEW  CLINICAL HISTORY: Short of breath COMPARISONS: None  FINDINGS: Single  views of the chest is submitted. The cardiac silhouette is enlarged. Pulmonary vascular unremarkable.  Right sided trachea. Left lower lobe infiltrate/consolidation, collapse with left sided pleural effusion. LEFT LOWER LOBE INFILTRATE/CONSOLIDATION, COLLAPSE WITH LEFT SIDED PLEURAL EFFUSION    Xr Chest Portable    Result Date: 1/26/2021  EXAMINATION: Portable AP ERECT view of the chest. CLINICAL HISTORY:  SOB , Negative Covid-19 test. DATE: 1/25/2021 5:41 PM COMPARISONS: 7/24/2017 FINDINGS: The heart is moderately enlarged, similar to prior exam.  Prominent interstitial markings, consistent with increasing Central vascular congestion. The costophrenic angles are blunted secondary to pleural effusions bilaterally, left greater than  right. No pneumothorax. There are infiltrates/atelectasis within lung bases, left greater than right. There are moderate degenerative changes in spine. CARDIAC ENLARGEMENT WITH CENTRAL VESSEL CONGESTION AND BILATERAL PLEURAL EFFUSIONS, POSSIBLE CONGESTIVE HEART FAILURE. BIBASILAR INFILTRATES/ATELECTASIS, LEFT GREATER THAN RIGHT. IMPRESSION AND SUGGESTION:  1. Acute on chronic decompensated diastolic congestive heart failure  2. Paroxysmal A. fib with RVR, now rate under control  3. Left pleural effusion with basilar atelectasis, doubt pneumonia  4. Hypoxia on O2,  secondary to above  5. History of SVT status post ablation  6. Coronary artery disease  7. History of ascending and descending thoracic aortic aneurysm  8. Hypertension    Patient had thoracentesis yesterday pleural fluid shows protein is elevated though LDH is normal range. Glucose is normal.  Amylase is normal cytology is pending.   Going for cardiac cath Monday      Electronically signed by Chey Irene MD, FCCP on 1/30/2021 at 7:50 PM

## 2021-02-01 ENCOUNTER — APPOINTMENT (OUTPATIENT)
Dept: CARDIAC CATH/INVASIVE PROCEDURES | Age: 86
DRG: 287 | End: 2021-02-01
Payer: MEDICARE

## 2021-02-01 LAB
ANION GAP SERPL CALCULATED.3IONS-SCNC: 6 MEQ/L (ref 9–15)
BODY FLUID CULTURE, STERILE: NORMAL
BUN BLDV-MCNC: 20 MG/DL (ref 8–23)
CALCIUM SERPL-MCNC: 8.8 MG/DL (ref 8.5–9.9)
CHLORIDE BLD-SCNC: 105 MEQ/L (ref 95–107)
CO2: 27 MEQ/L (ref 20–31)
CREAT SERPL-MCNC: 0.66 MG/DL (ref 0.5–0.9)
GFR AFRICAN AMERICAN: >60
GFR NON-AFRICAN AMERICAN: >60
GLUCOSE BLD-MCNC: 97 MG/DL (ref 70–99)
HCT VFR BLD CALC: 34.5 % (ref 37–47)
HEMOGLOBIN: 11 G/DL (ref 12–16)
MCH RBC QN AUTO: 28.5 PG (ref 27–31.3)
MCHC RBC AUTO-ENTMCNC: 32 % (ref 33–37)
MCV RBC AUTO: 89.2 FL (ref 82–100)
PDW BLD-RTO: 14.1 % (ref 11.5–14.5)
PLATELET # BLD: 278 K/UL (ref 130–400)
POTASSIUM SERPL-SCNC: 4 MEQ/L (ref 3.4–4.9)
RBC # BLD: 3.87 M/UL (ref 4.2–5.4)
SODIUM BLD-SCNC: 138 MEQ/L (ref 135–144)
WBC # BLD: 8 K/UL (ref 4.8–10.8)

## 2021-02-01 PROCEDURE — 97530 THERAPEUTIC ACTIVITIES: CPT

## 2021-02-01 PROCEDURE — 2060000000 HC ICU INTERMEDIATE R&B

## 2021-02-01 PROCEDURE — 2709999900 HC NON-CHARGEABLE SUPPLY

## 2021-02-01 PROCEDURE — 93458 L HRT ARTERY/VENTRICLE ANGIO: CPT | Performed by: INTERNAL MEDICINE

## 2021-02-01 PROCEDURE — 92978 ENDOLUMINL IVUS OCT C 1ST: CPT | Performed by: INTERNAL MEDICINE

## 2021-02-01 PROCEDURE — 93571 IV DOP VEL&/PRESS C FLO 1ST: CPT | Performed by: INTERNAL MEDICINE

## 2021-02-01 PROCEDURE — 2500000003 HC RX 250 WO HCPCS

## 2021-02-01 PROCEDURE — C1725 CATH, TRANSLUMIN NON-LASER: HCPCS

## 2021-02-01 PROCEDURE — B2151ZZ FLUOROSCOPY OF LEFT HEART USING LOW OSMOLAR CONTRAST: ICD-10-PCS | Performed by: INTERNAL MEDICINE

## 2021-02-01 PROCEDURE — B241ZZ3 ULTRASONOGRAPHY OF MULTIPLE CORONARY ARTERIES, INTRAVASCULAR: ICD-10-PCS | Performed by: INTERNAL MEDICINE

## 2021-02-01 PROCEDURE — C1894 INTRO/SHEATH, NON-LASER: HCPCS

## 2021-02-01 PROCEDURE — C1887 CATHETER, GUIDING: HCPCS

## 2021-02-01 PROCEDURE — 80048 BASIC METABOLIC PNL TOTAL CA: CPT

## 2021-02-01 PROCEDURE — 2580000003 HC RX 258: Performed by: INTERNAL MEDICINE

## 2021-02-01 PROCEDURE — 2700000000 HC OXYGEN THERAPY PER DAY

## 2021-02-01 PROCEDURE — 2580000003 HC RX 258

## 2021-02-01 PROCEDURE — 99232 SBSQ HOSP IP/OBS MODERATE 35: CPT | Performed by: INTERNAL MEDICINE

## 2021-02-01 PROCEDURE — 36415 COLL VENOUS BLD VENIPUNCTURE: CPT

## 2021-02-01 PROCEDURE — C1769 GUIDE WIRE: HCPCS

## 2021-02-01 PROCEDURE — 6360000004 HC RX CONTRAST MEDICATION: Performed by: INTERNAL MEDICINE

## 2021-02-01 PROCEDURE — 6370000000 HC RX 637 (ALT 250 FOR IP): Performed by: INTERNAL MEDICINE

## 2021-02-01 PROCEDURE — 97116 GAIT TRAINING THERAPY: CPT

## 2021-02-01 PROCEDURE — 85027 COMPLETE CBC AUTOMATED: CPT

## 2021-02-01 PROCEDURE — B2111ZZ FLUOROSCOPY OF MULTIPLE CORONARY ARTERIES USING LOW OSMOLAR CONTRAST: ICD-10-PCS | Performed by: INTERNAL MEDICINE

## 2021-02-01 PROCEDURE — 6360000002 HC RX W HCPCS

## 2021-02-01 PROCEDURE — 4A023N7 MEASUREMENT OF CARDIAC SAMPLING AND PRESSURE, LEFT HEART, PERCUTANEOUS APPROACH: ICD-10-PCS | Performed by: INTERNAL MEDICINE

## 2021-02-01 RX ORDER — SODIUM CHLORIDE 9 MG/ML
INJECTION, SOLUTION INTRAVENOUS
Status: COMPLETED
Start: 2021-02-01 | End: 2021-02-01

## 2021-02-01 RX ORDER — SODIUM CHLORIDE 9 MG/ML
INJECTION, SOLUTION INTRAVENOUS CONTINUOUS
Status: ACTIVE | OUTPATIENT
Start: 2021-02-01 | End: 2021-02-01

## 2021-02-01 RX ORDER — SODIUM CHLORIDE 9 MG/ML
INJECTION, SOLUTION INTRAVENOUS CONTINUOUS
Status: DISCONTINUED | OUTPATIENT
Start: 2021-02-01 | End: 2021-02-01

## 2021-02-01 RX ADMIN — CITALOPRAM HYDROBROMIDE 20 MG: 20 TABLET ORAL at 08:21

## 2021-02-01 RX ADMIN — SODIUM CHLORIDE: 9 INJECTION, SOLUTION INTRAVENOUS at 15:38

## 2021-02-01 RX ADMIN — CARVEDILOL 3.12 MG: 3.12 TABLET, FILM COATED ORAL at 08:21

## 2021-02-01 RX ADMIN — DILTIAZEM HYDROCHLORIDE 120 MG: 120 CAPSULE, COATED, EXTENDED RELEASE ORAL at 08:21

## 2021-02-01 RX ADMIN — POTASSIUM CHLORIDE 20 MEQ: 20 TABLET, EXTENDED RELEASE ORAL at 08:21

## 2021-02-01 RX ADMIN — LEVOTHYROXINE SODIUM 88 MCG: 88 TABLET ORAL at 05:40

## 2021-02-01 RX ADMIN — DILTIAZEM HYDROCHLORIDE 120 MG: 120 CAPSULE, COATED, EXTENDED RELEASE ORAL at 21:16

## 2021-02-01 RX ADMIN — IOPAMIDOL 80 ML: 612 INJECTION, SOLUTION INTRAVENOUS at 16:37

## 2021-02-01 RX ADMIN — ASPIRIN 81 MG: 81 TABLET, CHEWABLE ORAL at 08:21

## 2021-02-01 RX ADMIN — FUROSEMIDE 20 MG: 20 TABLET ORAL at 08:28

## 2021-02-01 RX ADMIN — Medication 10 ML: at 21:17

## 2021-02-01 RX ADMIN — RIVAROXABAN 20 MG: 20 TABLET, FILM COATED ORAL at 21:16

## 2021-02-01 RX ADMIN — CARVEDILOL 3.12 MG: 3.12 TABLET, FILM COATED ORAL at 21:16

## 2021-02-01 RX ADMIN — LOSARTAN POTASSIUM 50 MG: 50 TABLET, FILM COATED ORAL at 08:21

## 2021-02-01 RX ADMIN — Medication 10 ML: at 08:22

## 2021-02-01 ASSESSMENT — PAIN SCALES - GENERAL: PAINLEVEL_OUTOF10: 0

## 2021-02-01 NOTE — PROGRESS NOTES
Right radial hemostasis, Site is stable with no bleeding or hematoma noted.   Report called to Rosario Sequeira.

## 2021-02-01 NOTE — PROGRESS NOTES
INPATIENT PROGRESS NOTES    PATIENT NAME: Sara Lai  MRN: [id number]  SERVICE DATE:  February 1, 2021   SERVICE TIME:  12:51 PM      PRIMARY SERVICE: Pulmonary Disease    CHIEF COMPLAINTS: Back pain    INTERVAL HPI: Patient seen and examined at bedside, Interval Notes, orders reviewed. Nursing notes noted    Patient report patient complain of lumbar spine pain, and tenderness, otherwise denies shortness of breath, no chest pain, no fever, no coughing, no nausea no vomiting    Review of system:     GI Abdominal pain No  Skin Rash No    Social History     Tobacco Use    Smoking status: Never Smoker    Smokeless tobacco: Never Used   Substance Use Topics    Alcohol use: No     Alcohol/week: 0.0 standard drinks         Problem Relation Age of Onset    Arthritis Mother     Arthritis Father     High Blood Pressure Father          OBJECTIVE    Body mass index is 27.1 kg/m². PHYSICAL EXAM:  Vitals:  /80   Pulse 65   Temp 97.9 °F (36.6 °C) (Oral)   Resp 18   Ht 5' 4\" (1.626 m)   Wt 157 lb 14.4 oz (71.6 kg)   SpO2 97%   BMI 27.10 kg/m²     General: alert, cooperative, no distress  Head: normocephalic, atraumatic  Eyes:No gross abnormalities. ENT:  MMM no lesions  Neck:  supple and no masses  Chest : Good air movement, no wheezing, no rales, nontender, tympanic  Heart[de-identified] Heart sounds are normal.  Regular rate and rhythm without murmur, gallop or rub. ABD:  symmetric, soft, non-tender, no guarding or rebound  Musculoskeletal : no cyanosis, no clubbing and no edema  Neuro:  Grossly normal  Skin: No rashes or nodules noted.   Lymph node:  no cervical nodes  Urology: No Zeng   Psychiatric: appropriate    DATA:   Recent Labs     01/31/21  0550 02/01/21  0511   WBC 9.9 8.0   HGB 11.9* 11.0*   HCT 36.9* 34.5*   MCV 89.1 89.2    278     Recent Labs     01/31/21  0550 02/01/21  0511    138   K 3.8 4.0    105   CO2 28 27   BUN 23 20   CREATININE 0.64 0.66   GLUCOSE 108* 97   CALCIUM 8.7 8. 8   LABGLOM >60.0 >60.0   GFRAA >60.0 >60.0       MV Settings:          No results for input(s): PHART, EOF4CFR, PO2ART, HCY8CAO, BEART, U9IVMYSG in the last 72 hours. O2 Device: Nasal cannula  O2 Flow Rate (L/min): 4 L/min    DIET LOW SODIUM 2 GM;     MEDICATIONS during current hospitalization:    Continuous Infusions:    Scheduled Meds:   predniSONE  50 mg Oral Once    diphenhydrAMINE  50 mg Oral Once    hydrocortisone sodium succinate PF  200 mg Intravenous Once    losartan  50 mg Oral Daily    furosemide  20 mg Oral Daily    carvedilol  3.125 mg Oral BID    enoxaparin  1 mg/kg Subcutaneous Daily    aspirin  81 mg Oral Daily    citalopram  20 mg Oral Daily    dilTIAZem  120 mg Oral BID    potassium chloride  20 mEq Oral Daily with breakfast    levothyroxine  88 mcg Oral Daily    sodium chloride flush  10 mL Intravenous 2 times per day       PRN Meds:LORazepam, sodium chloride flush, sodium chloride flush, promethazine **OR** ondansetron, acetaminophen **OR** acetaminophen, polyethylene glycol    Radiology  Echo Complete 2d W Doppler W Color    Result Date: 1/27/2021  Transthoracic Echocardiography Report (TTE)  Demographics  Patient Name   Jose Guadalupe Buggy        Gender              Female                 Rakan Forrester  Patient Number [id number]          Race                                                   Ethnicity  Visit Number   [id number]         Room Number         C004  Corporate ID                     Date of Study       01/27/2021  Accession      [id number]        Referring Physician  Number  Date of Birth  07/12/1932        Sonographer         Kerri STRONG  Age            80 year(s)        Interpreting        [facility])                                   Physician           Cardiology                                                       Ainsley Jamison MD Procedure Type of Study  TTE procedure:ECHO COMPLETE 2D W/DOP W/COLOR.  Procedure Date Date: 01/27/2021 Start: 10:26 AM Study Location: Portable Technical Quality: Adequate visualization Indications:Congestive heart failure. Patient Status: Routine Height: 64 inches Weight: 165 pounds BSA: 1.8 m^2 BMI: 28.32 kg/m^2 BP: 108/77 mmHg  Conclusions  Summary  Mitral annular calcification is present. 1+ MR  Left ventricular ejection fraction is visually estimated at 40%. E/A flow reversal noted. Suggestive of diastolic dysfunction. Signature  ----------------------------------------------------------------  Electronically signed by Bhumi Braun MD(Interpreting  physician) on 01/27/2021 11:37 AM  ----------------------------------------------------------------  Findings Left Ventricle Left ventricular ejection fraction is visually estimated at 40%. E/A flow reversal noted. Suggestive of diastolic dysfunction. Right Ventricle Normal right ventricle structure and function. Normal right ventricle systolic pressure. Left Atrium Moderately dilated left atrium. Right Atrium Moderately enlarged right atrium size. Mitral Valve Mitral annular calcification is present. 1+ MR Tricuspid Valve Normal tricuspid valve structure and function. 1-2+ TR RVSP 38 mmHg Aortic Valve Aortic valve leaflets are moderately thickened. No AS No AR Pulmonic Valve The pulmonic valve was not well visualized . Pericardial Effusion No evidence of pericardial effusion. Pleural Effusion No evidence of pleural effusion. Aorta \ Miscellaneous The aorta is within normal limits. M-Mode Measurements (cm)  LVIDd: 4.39 cm                         LVIDs: 3.55 cm  IVSd: 1.43 cm                          IVSs: 1.55 cm  LVPWd: 1.5 cm                          LVPWs: 1.48 cm  Rt. Vent.  Dimension: 3.74 cm           AO Root Dimension: 3.62 cm                                         ACS: 1.25 cm                                         LA: 4.54 cm                                         LVOT: 2.04 cm Doppler Measurements:  AV Velocity:0.02 m/s                    MV Peak E-Wave: 0.85 m/s  AV Peak Gradient: 10.44 mmHg            MV Peak A-Wave: 0.87 m/s  AV Mean Gradient: 6.01 mmHg  AV Area (Continuity):1.83 cm^2  TR Velocity:2.62 m/s                    Estimated RAP:10 mmHg  TR Gradient:27.54 mmHg                  RVSP:37.54 mmHg Valves  Mitral Valve  Peak E-Wave: 0.85 m/s                 Peak A-Wave: 0.87 m/s                                        E/A Ratio: 0.97                                        Peak Gradient: 2.88 mmHg                                        Deceleration Time: 232.8 msec  Tissue Doppler  E' Septal Velocity: 0.07 m/s  E' Lateral Velocity: 0.08 m/s  Aortic Valve  Peak Velocity: 1.62 m/s                Mean Velocity: 1.15 m/s  Peak Gradient: 10.44 mmHg              Mean Gradient: 6.01 mmHg  Area (continuity): 1.83 cm^2           Area (2D): 1.8 cm^2  AV VTI: 28.8 cm  Cusp Separation: 1.25 cm  Tricuspid Valve  Estimated RVSP: 37.54 mmHg              Estimated RAP: 10 mmHg  TR Velocity: 2.62 m/s                   TR Gradient: 27.54 mmHg  Pulmonic Valve  Peak Velocity: 0.96 m/s           Peak Gradient: 3.66 mmHg                                    Estimated PASP: 37.54 mmHg  LVOT  Peak Velocity: 0.89 m/s              Mean Velocity: 0.57 m/s  Peak Gradient: 3.16 mmHg             Mean Gradient: 1.58 mmHg  LVOT Diameter: 2.04 cm               LVOT VTI: 16.15 cm Structures  Left Atrium  LA Dimension: 4.54 cm                        LA Area: 11.81 cm^2  LA/Aorta: 1.25  LA Volume/Index: 44.64 ml /25 m^2  Left Ventricle  Diastolic Dimension: 8.48 cm         Systolic Dimension: 0.19 cm  Septum Diastolic: 5.71 cm            Septum Systolic: 4.37 cm  PW Diastolic: 1.5 cm                 PW Systolic: 4.04 cm                                       FS: 19.1 %  LV EDV/LV EDV Index: 87.32 ml/49 m^2 LV ESV/LV ESV Index: 52.68 ml/29 m^2  EF Calculated: 39.7 %                LV Length: 6.12 cm  LVOT Diameter: 2.04 cm  Right Atrium  RA Systolic Pressure: 10 mmHg  Right Ventricle  Diastolic Dimension: 9.43 cm using lidocaine for local anesthesia, a 19-gauge Yueh catheter was inserted in the pleural cavity until effusion was aspirated. Using Vacutainer bottles, 755 mL of clear yellow effusion was drained. The catheter was removed and the aspiration site dressed. The patient tolerated procedure well. No immediate complications were identified. Subsequent chest radiograph demonstrated no evidence of pneumothorax. The patient left the radiology department in stable condition. Radiology nurse monitored patient's  vital signs throughout the procedure which lasted approximately 30 minutes. 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 Fib    Recommendation  · Follow-up fluid cytology  · We will need monitoring, repeat thoracentesis if fluid reaccumulate, and if nondiagnostic might need VATS and pleural biopsy. · Follow-up CT chest in 6 to 8 weeks  · Follow-up with Dr. Maggie Curtis in 1 week for cytology results review and CXR prior to the visit   · Discussed with patient son reported to him my concern of different etiologies but mainly on the list is malignancy, discussed with him that he needs to keep follow-up with pulmonary with follow-up chest x-ray and work-up as above.     I will sign off for now, please call for any question or concern, please let me know if cytology results came back       Electronically signed by Naa Henriquez MD,  [facility]   on 2/1/2021 at 12:51 PM

## 2021-02-01 NOTE — BRIEF OP NOTE
Section of Cardiology  Adult Brief Cardiac Cath Procedure Note        Procedure(s):  LHC, b/l coronary angio    Pre-operative Diagnosis:  CMP    H&P Status: Completed and reviewed.      Post-operative Diagnosis:      LV EF of 40%  LM normal   LAD 60% in mid with negative IFR =0.96    Findings:  See full report    Complications:  none    Primary Proceduralist:   Dr.Wes Franco DO    Plan    Max med rx  rfm        Full procedure note to follow

## 2021-02-01 NOTE — PROGRESS NOTES
[facility] Occupational Therapy  Date: 2021  Patient Name: Waleska Chandler        MRN: [id number]  Account: [de-identified]   : 1932  (80 y.o.)  Room: [place] [address]    Chart reviewed, pt. has not had a change in medical status at this time. Pt. is currently on program with OT. New eval not indicated at this time.     Electronically signed by SERAFIN Courtney on 2021 at 8:32 AM

## 2021-02-01 NOTE — PLAN OF CARE
Nutrition Problem #1: Inadequate oral intake  Intervention: Food and/or Nutrient Delivery: Continue Current Diet, Start Oral Nutrition Supplement(high calorie ONS BID)  Nutritional Goals: Po intake to be >50% of meals and ONS

## 2021-02-01 NOTE — PROGRESS NOTES
Pt. requires physical assistance (25%, 50%, 75% assist from helper) for task but is able to actively participate in task   Dependent = Pt. requires total assistance with task and is not able to actively participate with task completion    Functional Balance:  Balance  Sitting Balance: Supervision  Standing Balance: Contact guard assistance   Functional Mobility  Functional - Mobility Device: Rolling Walker  Activity: Other  Assist Level: Contact guard assistance  Functional Mobility Comments: Patient takes side steps at EOB    Cognition:  Cognition  Overall Cognitive Status: Exceptions  Arousal/Alertness: Appropriate responses to stimuli  Following Commands: Follows one step commands consistently(Visual and tactile cues provided)  Attention Span: Appears intact  Memory: (Unable to accurately assess secondary to language barrier)  Safety Judgement: Decreased awareness of need for safety  Problem Solving: Assistance required to generate solutions, Assistance required to implement solutions, Assistance required to identify errors made, Assistance required to correct errors made  Insights: Fully aware of deficits  Initiation: Does not require cues  Sequencing: Does not require cues  Cognition Comment: Pt follows one step commands with incresased time. pt exhibiting increased anxiety when completing tasks    Bed Mobility  Bed mobility  Supine to Sit: Stand by assistance(HOB elevated)  Sit to Supine: Unable to assess(Patient sitting EOB with physical therapy at end of treatment session)    UE Exercises:  Patient engages in bilateral AAROM activity for increased strength and endurance for ADL task completion. Using a pillowcase, patient performs 1 set x 10 reps of the following: shoulder flexion, elbow flexion/extension, horizontal ab/adduction, overhead press, and wrist curls. Patient also performs 1 set x 10 reps forearm rotation and digit flexion/extension without the use of pillowcase.  Patient tolerates well with no reports of discomfort or fatigue.      Treatment consisted of:  ADL Training  Transfer Training  Strengthening  Patient Education    Assessment/Discharge Disposition:     Performance deficits / Impairments: Decreased functional mobility , Decreased balance, Decreased ADL status, Decreased high-level IADLs, Decreased endurance, Decreased strength, Decreased posture  Prognosis: Good  Discharge Recommendations: Continue to assess pending progress  History: 10 complexities  Exam: 7 complexities  Assistance / Modification: Max A      6-Click  How much help for putting on and taking off regular lower body clothing?: A Lot  How much help for Bathing?: A Little  How much help for Toileting?: A Little  How much help for putting on and taking off regular upper body clothing?: A Little  How much help for taking care of personal grooming?: None  How much help for eating meals?: None  AM-[facility] Inpatient Daily Activity Raw Score: 19  AM-[facility] Inpatient ADL T-Scale Score : 40.22  ADL Inpatient CMS 0-100% Score: 42.8    Plan:  Continue OT per POC    Goals/Plan:    Improve Balance  Improve Strength  Improve Functional Transfers  Improve ADL [place]    Minutes:    OT Individual Minutes  Time In: 1040  Time Out: 1055  Minutes: 15    Therapeutic activities: 15 minutes    Electronically signed by: Electronically signed by SERAFIN Gomez on 2/1/2021 at 11:05 AM

## 2021-02-01 NOTE — PROGRESS NOTES
Physical Therapy Med Surg Daily Treatment Note  Facility/Department: [address] [facility]  [facility]       NAME: Mana Grajeda  : 1932 (80 y.o.)  MRN: [id number]  CODE STATUS: Full Code    Date of Service: 2021    Patient Diagnosis(es): Acute on chronic combined systolic and diastolic CHF (congestive heart failure) ([address] [I50.43]   Chief Complaint   Patient presents with    Shortness of Breath     sob last 3 days     Patient Active Problem List    Diagnosis Date Noted    Impaired mobility and activities of daily living due to [employer], severe lumbar canal stenosis s/p Rt and Rui L3-4, L4-5 microdissection and decompression, [facility] admit 16      Priority: High    Lumbar stenosis with neurogenic claudication 2016     Priority: Medium    Gait abnormality 2021    Acute on chronic combined systolic and diastolic CHF (congestive heart failure) ([address] 2021    Essential hypertension 2018    Shortness of breath     Dizziness     Ascending aortic aneurysm ([address] 2017    Descending aortic aneurysm ([address] 2017    Osteoarthritis     Myelopathy ([address]     Headache     Depression     Acquired scoliosis 2016    Osteoporosis 2016    Charcot Arleen Tooth muscular atrophy 2016    Excess weight 2016    Osteoarthritis of lumbar spine with myelopathy 2016        Past Medical History:   Diagnosis Date    Ascending aortic aneurysm ([address] 2017    Charcot Arleen Tooth muscular atrophy     Depression     Descending aortic aneurysm ([address] 2017    Essential hypertension 2018    Headache     HTN (hypertension)     Impaired mobility and activities of daily living     Lumbar stenosis with neurogenic claudication     Myelopathy ([address]     Osteoarthritis      Past Surgical History:   Procedure Laterality Date    JOINT REPLACEMENT      THORACENTESIS Left 2021    total of 755 cc removed per Dr Elisha Madsen specimen sent to lab       Restrictions  Restrictions/Precautions: Fall Risk(High per Dorothy Mech score)    SUBJECTIVE   General  Chart Reviewed: Yes  Response To Previous Treatment: Patient with no complaints from previous session. Family / Caregiver Present: Yes(daughter)  Subjective  Subjective: Patient seated at EOB with OT upon arrival.    Pre-Session Pain Report     Pain Screening  Patient Currently in Pain: Denies  Post-Session Pain Report  Denies     OBJECTIVE   Bed mobility  Supine to Sit: (HOB elevated)  Sit to Supine: Stand by assistance(Patient sitting EOB with physical therapy at end of treatment session)  Scooting: Stand by assistance  Comment: HOB flat, patient completes without bed rails    Transfers  Sit to Stand: Contact guard assistance;Stand by assistance  Stand to sit: Contact guard assistance;Stand by assistance    Ambulation  Ambulation?: Yes  Ambulation 1  Surface: level tile  Device: Rolling Walker  Assistance: Contact guard assistance  Quality of Gait: bilateral foot drop, no LOB, reciprocal with decreased speed  Gait Deviations: Slow [place];Decreased step length;Decreased step height  Distance: 100 feet  ASSESSMENT   Assessment: Patient tolerates increased ambulatory distance without c/o SOB/ fatigue. Demonstrates good safety with transfers. Discharge Recommendations:  Continue to assess pending progress    Goals  Long term goals  Long term goal 1: indep bed mobility  Long term goal 2: indep bed transfers  Long term goal 3: indep gait with ww 50 feet  Long term goal 4: SBA 4 stairs    PLAN    Times per week: 3-6  Safety Devices  Type of devices:  All fall risk precautions in place, Call light within reach, Chair alarm in place     [facility] (6 CLICK) [address] Inpatient Mobility Raw Score : 20     Therapy Time   Individual   Time In 1054   Time Out 1110   Minutes 16     Timed Code Treatment Minutes: [address]  Tr 6   Marianela Collado PTA, 02/01/21 at 12:00 PM         Definitions for assistance levels  Independent = pt does not require any physical supervision or assistance from another person for activity completion. Device may be needed.   Stand by assistance = pt requires verbal cues or instructions from another person, close to but not touching, to perform the activity  Minimal assistance= pt performs 75% or more of the activity; assistance is required to complete the activity  Moderate assistance= pt performs 50% of the activity; assistance is required to complete the activity  Maximal assistance = pt performs 25% of the activity; assistance is required to complete the activity  Dependent = pt requires total physical assistance to accomplish the task

## 2021-02-01 NOTE — PROGRESS NOTES
Comprehensive Nutrition Assessment    Type and Reason for Visit:  Initial, RD Nutrition Re-Screen/LOS    Nutrition Recommendations/Plan: Continue current low sodium diet. Provide high calorie ONS BID. Nutrition Assessment:  Pt admitted for CHF. Pt at risk for malnutrition secondary to >2% of weight loss in 1 week. Intakes 51-75%. Will continue current low sodium diet and provide high arnulfo ONS BID. Malnutrition Assessment:  Malnutrition Status: At risk for malnutrition (Comment)    Context:  Acute Illness     Findings of the 6 clinical characteristics of malnutrition:  Energy Intake:  No significant decrease in energy intake  Weight Loss:  7 - Greater than 2% over 1 week     Body Fat Loss:  No significant body fat loss     Muscle Mass Loss:  No significant muscle mass loss    Fluid Accumulation:  Unable to assess     Strength:  Not Performed    Estimated Daily Nutrient Needs:  Energy (kcal):  0573-0621 (20-23kcal/kg); Weight Used for Energy Requirements:  Current(72kg)     Protein (g):  72-83g (1.3-1.5g/kg); Weight Used for Protein Requirements:  Ideal(55kg)        Fluid (ml/day):  1500ml; Method Used for Fluid Requirements:     1ml/kcal    Nutrition Related Findings:  pmhx: CHF, HTN, aortic aneurysm. No edema. Labs noted. Last BM 1/31. Po intakes 51-75%. Based on bedscale weights pt has lost 8# since admission (4.8% of BW). Wounds:  None       Current Nutrition Therapies:    DIET LOW SODIUM 2 GM; Anthropometric Measures:  · Height: 5' 4\" (162.6 cm)  · Current Body Weight: 157 lb (71.2 kg)(bed scale 2/1)   · Admission Body Weight: 165 lb (74.8 kg)(bedscale 1/26)    · Usual Body Weight: 165 lb (74.8 kg)(stated)     · Ideal Body Weight: 120 lbs; % Ideal Body Weight 130.8 %   · BMI: 26.9  · BMI Categories: Overweight (BMI 25.0-29. 9)       Nutrition Diagnosis:   · Inadequate oral intake related to cardiac dysfunction as evidenced by weight loss greater than or equal to 2% in 1 week    Nutrition Interventions:   Food and/or Nutrient Delivery:  Continue Current Diet, Start Oral Nutrition Supplement  Nutrition Education/Counseling:  Education initiated   Coordination of Nutrition Care:  Continue to monitor while inpatient    Goals:  Po intake to be >50% of meals and ONS       Nutrition Monitoring and Evaluation:   Behavioral-Environmental Outcomes:  None Identified   Food/Nutrient Intake Outcomes:  Food and Nutrient Intake, Supplement Intake  Physical Signs/Symptoms Outcomes:  Weight     Discharge Planning:    No discharge needs at this time     Electronically signed by Jamila Shin MS, RD, LD on 2/1/21 at 1:27 PM EST

## 2021-02-01 NOTE — PROGRESS NOTES
Dr. Marylee Cutter made aware of accucheck of [phone].   Patient will be admitted and monitored overnight

## 2021-02-02 ENCOUNTER — HOSPITAL ENCOUNTER (INPATIENT)
Age: 86
LOS: 7 days | Discharge: ANOTHER HOSPITAL | DRG: 091 | End: 2021-02-09
Attending: PHYSICAL MEDICINE & REHABILITATION | Admitting: PHYSICAL MEDICINE & REHABILITATION
Payer: MEDICARE

## 2021-02-02 VITALS
OXYGEN SATURATION: 95 % | HEART RATE: 83 BPM | HEIGHT: 64 IN | RESPIRATION RATE: 20 BRPM | BODY MASS INDEX: 26.96 KG/M2 | WEIGHT: 157.9 LBS | SYSTOLIC BLOOD PRESSURE: 104 MMHG | TEMPERATURE: 97.6 F | DIASTOLIC BLOOD PRESSURE: 58 MMHG

## 2021-02-02 LAB
ANION GAP SERPL CALCULATED.3IONS-SCNC: 11 MEQ/L (ref 9–15)
BUN BLDV-MCNC: 16 MG/DL (ref 8–23)
CALCIUM SERPL-MCNC: 8.6 MG/DL (ref 8.5–9.9)
CHLORIDE BLD-SCNC: 109 MEQ/L (ref 95–107)
CO2: 24 MEQ/L (ref 20–31)
CREAT SERPL-MCNC: 0.6 MG/DL (ref 0.5–0.9)
GFR AFRICAN AMERICAN: >60
GFR NON-AFRICAN AMERICAN: >60
GLUCOSE BLD-MCNC: 102 MG/DL (ref 70–99)
HCT VFR BLD CALC: 33.3 % (ref 37–47)
HEMOGLOBIN: 10.7 G/DL (ref 12–16)
MCH RBC QN AUTO: 29 PG (ref 27–31.3)
MCHC RBC AUTO-ENTMCNC: 32.1 % (ref 33–37)
MCV RBC AUTO: 90.4 FL (ref 82–100)
PDW BLD-RTO: 14.2 % (ref 11.5–14.5)
PLATELET # BLD: 263 K/UL (ref 130–400)
POTASSIUM SERPL-SCNC: 3.7 MEQ/L (ref 3.4–4.9)
RBC # BLD: 3.69 M/UL (ref 4.2–5.4)
SARS-COV-2, NAAT: NOT DETECTED
SODIUM BLD-SCNC: 144 MEQ/L (ref 135–144)
WBC # BLD: 8.1 K/UL (ref 4.8–10.8)

## 2021-02-02 PROCEDURE — 6370000000 HC RX 637 (ALT 250 FOR IP): Performed by: PHYSICAL MEDICINE & REHABILITATION

## 2021-02-02 PROCEDURE — 2580000003 HC RX 258: Performed by: INTERNAL MEDICINE

## 2021-02-02 PROCEDURE — 1180000000 HC REHAB R&B

## 2021-02-02 PROCEDURE — U0002 COVID-19 LAB TEST NON-CDC: HCPCS

## 2021-02-02 PROCEDURE — 6370000000 HC RX 637 (ALT 250 FOR IP): Performed by: INTERNAL MEDICINE

## 2021-02-02 PROCEDURE — 97116 GAIT TRAINING THERAPY: CPT

## 2021-02-02 PROCEDURE — 2700000000 HC OXYGEN THERAPY PER DAY

## 2021-02-02 PROCEDURE — 36415 COLL VENOUS BLD VENIPUNCTURE: CPT

## 2021-02-02 PROCEDURE — 80048 BASIC METABOLIC PNL TOTAL CA: CPT

## 2021-02-02 PROCEDURE — 85027 COMPLETE CBC AUTOMATED: CPT

## 2021-02-02 PROCEDURE — 99239 HOSP IP/OBS DSCHRG MGMT >30: CPT | Performed by: INTERNAL MEDICINE

## 2021-02-02 RX ORDER — SODIUM CHLORIDE 0.9 % (FLUSH) 0.9 %
10 SYRINGE (ML) INJECTION EVERY 12 HOURS SCHEDULED
Status: DISCONTINUED | OUTPATIENT
Start: 2021-02-02 | End: 2021-02-03

## 2021-02-02 RX ORDER — ACETAMINOPHEN 325 MG/1
650 TABLET ORAL EVERY 6 HOURS PRN
Status: DISCONTINUED | OUTPATIENT
Start: 2021-02-02 | End: 2021-02-09 | Stop reason: HOSPADM

## 2021-02-02 RX ORDER — CITALOPRAM 20 MG/1
20 TABLET ORAL DAILY
Status: DISCONTINUED | OUTPATIENT
Start: 2021-02-03 | End: 2021-02-09 | Stop reason: HOSPADM

## 2021-02-02 RX ORDER — PANTOPRAZOLE SODIUM 40 MG/1
40 TABLET, DELAYED RELEASE ORAL
Status: DISCONTINUED | OUTPATIENT
Start: 2021-02-03 | End: 2021-02-06

## 2021-02-02 RX ORDER — DILTIAZEM HYDROCHLORIDE 120 MG/1
120 CAPSULE, COATED, EXTENDED RELEASE ORAL DAILY
Status: CANCELLED | OUTPATIENT
Start: 2021-02-03

## 2021-02-02 RX ORDER — POTASSIUM CHLORIDE 20 MEQ/1
20 TABLET, EXTENDED RELEASE ORAL
Status: CANCELLED | OUTPATIENT
Start: 2021-02-03

## 2021-02-02 RX ORDER — CARVEDILOL 3.12 MG/1
3.12 TABLET ORAL 2 TIMES DAILY
Status: DISCONTINUED | OUTPATIENT
Start: 2021-02-02 | End: 2021-02-09 | Stop reason: HOSPADM

## 2021-02-02 RX ORDER — SODIUM CHLORIDE 0.9 % (FLUSH) 0.9 %
10 SYRINGE (ML) INJECTION
Status: DISCONTINUED | OUTPATIENT
Start: 2021-02-02 | End: 2021-02-09 | Stop reason: HOSPADM

## 2021-02-02 RX ORDER — ACETAMINOPHEN 650 MG/1
650 SUPPOSITORY RECTAL EVERY 6 HOURS PRN
Status: DISCONTINUED | OUTPATIENT
Start: 2021-02-02 | End: 2021-02-02

## 2021-02-02 RX ORDER — DILTIAZEM HYDROCHLORIDE 120 MG/1
120 CAPSULE, COATED, EXTENDED RELEASE ORAL DAILY
Status: DISCONTINUED | OUTPATIENT
Start: 2021-02-03 | End: 2021-02-09 | Stop reason: HOSPADM

## 2021-02-02 RX ORDER — LORAZEPAM 0.5 MG/1
0.5 TABLET ORAL EVERY 6 HOURS PRN
Status: CANCELLED | OUTPATIENT
Start: 2021-02-02

## 2021-02-02 RX ORDER — PROMETHAZINE HYDROCHLORIDE 12.5 MG/1
12.5 TABLET ORAL EVERY 6 HOURS PRN
Status: DISCONTINUED | OUTPATIENT
Start: 2021-02-02 | End: 2021-02-09 | Stop reason: HOSPADM

## 2021-02-02 RX ORDER — POLYETHYLENE GLYCOL 3350 17 G/17G
17 POWDER, FOR SOLUTION ORAL DAILY PRN
Status: CANCELLED | OUTPATIENT
Start: 2021-02-02

## 2021-02-02 RX ORDER — ACETAMINOPHEN 325 MG/1
650 TABLET ORAL EVERY 6 HOURS PRN
Status: CANCELLED | OUTPATIENT
Start: 2021-02-02

## 2021-02-02 RX ORDER — ONDANSETRON 2 MG/ML
4 INJECTION INTRAMUSCULAR; INTRAVENOUS EVERY 6 HOURS PRN
Status: DISCONTINUED | OUTPATIENT
Start: 2021-02-02 | End: 2021-02-09 | Stop reason: HOSPADM

## 2021-02-02 RX ORDER — POLYETHYLENE GLYCOL 3350 17 G/17G
17 POWDER, FOR SOLUTION ORAL DAILY PRN
Status: DISCONTINUED | OUTPATIENT
Start: 2021-02-02 | End: 2021-02-09 | Stop reason: HOSPADM

## 2021-02-02 RX ORDER — SODIUM CHLORIDE 0.9 % (FLUSH) 0.9 %
10 SYRINGE (ML) INJECTION EVERY 12 HOURS SCHEDULED
Status: CANCELLED | OUTPATIENT
Start: 2021-02-02

## 2021-02-02 RX ORDER — LOSARTAN POTASSIUM 25 MG/1
25 TABLET ORAL DAILY
Status: DISCONTINUED | OUTPATIENT
Start: 2021-02-03 | End: 2021-02-09 | Stop reason: HOSPADM

## 2021-02-02 RX ORDER — LORAZEPAM 0.5 MG/1
0.5 TABLET ORAL EVERY 6 HOURS PRN
Status: DISCONTINUED | OUTPATIENT
Start: 2021-02-02 | End: 2021-02-09 | Stop reason: HOSPADM

## 2021-02-02 RX ORDER — ASPIRIN 81 MG/1
81 TABLET, CHEWABLE ORAL DAILY
Status: CANCELLED | OUTPATIENT
Start: 2021-02-03

## 2021-02-02 RX ORDER — LOSARTAN POTASSIUM 25 MG/1
25 TABLET ORAL DAILY
Status: CANCELLED | OUTPATIENT
Start: 2021-02-03

## 2021-02-02 RX ORDER — LEVOTHYROXINE SODIUM 88 UG/1
88 TABLET ORAL DAILY
Status: DISCONTINUED | OUTPATIENT
Start: 2021-02-03 | End: 2021-02-09 | Stop reason: HOSPADM

## 2021-02-02 RX ORDER — POTASSIUM CHLORIDE 20 MEQ/1
20 TABLET, EXTENDED RELEASE ORAL
Status: DISCONTINUED | OUTPATIENT
Start: 2021-02-03 | End: 2021-02-09 | Stop reason: HOSPADM

## 2021-02-02 RX ORDER — SODIUM CHLORIDE 0.9 % (FLUSH) 0.9 %
10 SYRINGE (ML) INJECTION PRN
Status: CANCELLED | OUTPATIENT
Start: 2021-02-02

## 2021-02-02 RX ORDER — CITALOPRAM 20 MG/1
20 TABLET ORAL DAILY
Status: CANCELLED | OUTPATIENT
Start: 2021-02-03

## 2021-02-02 RX ORDER — CARVEDILOL 3.12 MG/1
3.12 TABLET ORAL 2 TIMES DAILY
Status: CANCELLED | OUTPATIENT
Start: 2021-02-02

## 2021-02-02 RX ORDER — PROMETHAZINE HYDROCHLORIDE 12.5 MG/1
12.5 TABLET ORAL EVERY 6 HOURS PRN
Status: CANCELLED | OUTPATIENT
Start: 2021-02-02

## 2021-02-02 RX ORDER — SODIUM CHLORIDE 0.9 % (FLUSH) 0.9 %
10 SYRINGE (ML) INJECTION PRN
Status: DISCONTINUED | OUTPATIENT
Start: 2021-02-02 | End: 2021-02-09 | Stop reason: HOSPADM

## 2021-02-02 RX ORDER — SIMETHICONE 80 MG
80 TABLET,CHEWABLE ORAL EVERY 6 HOURS PRN
Status: DISCONTINUED | OUTPATIENT
Start: 2021-02-02 | End: 2021-02-09 | Stop reason: HOSPADM

## 2021-02-02 RX ORDER — LEVOTHYROXINE SODIUM 88 UG/1
88 TABLET ORAL DAILY
Status: CANCELLED | OUTPATIENT
Start: 2021-02-03

## 2021-02-02 RX ORDER — ONDANSETRON 2 MG/ML
4 INJECTION INTRAMUSCULAR; INTRAVENOUS EVERY 6 HOURS PRN
Status: CANCELLED | OUTPATIENT
Start: 2021-02-02

## 2021-02-02 RX ORDER — FUROSEMIDE 20 MG/1
20 TABLET ORAL DAILY
Status: DISCONTINUED | OUTPATIENT
Start: 2021-02-03 | End: 2021-02-08 | Stop reason: SDUPTHER

## 2021-02-02 RX ORDER — ASPIRIN 81 MG/1
81 TABLET, CHEWABLE ORAL DAILY
Status: DISCONTINUED | OUTPATIENT
Start: 2021-02-03 | End: 2021-02-09 | Stop reason: HOSPADM

## 2021-02-02 RX ORDER — FUROSEMIDE 20 MG/1
20 TABLET ORAL DAILY
Status: CANCELLED | OUTPATIENT
Start: 2021-02-03

## 2021-02-02 RX ORDER — ACETAMINOPHEN 650 MG/1
650 SUPPOSITORY RECTAL EVERY 6 HOURS PRN
Status: CANCELLED | OUTPATIENT
Start: 2021-02-02

## 2021-02-02 RX ORDER — SODIUM CHLORIDE 0.9 % (FLUSH) 0.9 %
10 SYRINGE (ML) INJECTION
Status: CANCELLED | OUTPATIENT
Start: 2021-02-02

## 2021-02-02 RX ADMIN — RIVAROXABAN 20 MG: 20 TABLET, FILM COATED ORAL at 17:19

## 2021-02-02 RX ADMIN — CARVEDILOL 3.12 MG: 3.12 TABLET, FILM COATED ORAL at 20:56

## 2021-02-02 RX ADMIN — CITALOPRAM HYDROBROMIDE 20 MG: 20 TABLET ORAL at 08:44

## 2021-02-02 RX ADMIN — FUROSEMIDE 20 MG: 20 TABLET ORAL at 08:44

## 2021-02-02 RX ADMIN — LEVOTHYROXINE SODIUM 88 MCG: 88 TABLET ORAL at 05:50

## 2021-02-02 RX ADMIN — Medication 10 ML: at 23:30

## 2021-02-02 RX ADMIN — POTASSIUM CHLORIDE 20 MEQ: 20 TABLET, EXTENDED RELEASE ORAL at 08:44

## 2021-02-02 RX ADMIN — SIMETHICONE 80 MG: 80 TABLET, CHEWABLE ORAL at 21:02

## 2021-02-02 RX ADMIN — ASPIRIN 81 MG: 81 TABLET, CHEWABLE ORAL at 08:44

## 2021-02-02 RX ADMIN — Medication 10 ML: at 08:45

## 2021-02-02 RX ADMIN — DILTIAZEM HYDROCHLORIDE 120 MG: 120 CAPSULE, COATED, EXTENDED RELEASE ORAL at 14:44

## 2021-02-02 RX ADMIN — LOSARTAN POTASSIUM 50 MG: 50 TABLET, FILM COATED ORAL at 14:44

## 2021-02-02 ASSESSMENT — PAIN SCALES - GENERAL: PAINLEVEL_OUTOF10: 0

## 2021-02-02 NOTE — PROGRESS NOTES
Subjective: The patient complains of ,\" moderate acute on chronic progressive fatigue and    partially relieved by rest,medications, Pt, OT, and rest and exacerbated by recent illness. I am concerned about patients medical complexities. Pleurel effusions tapped. Low BPs couldnot tolerate therapy. Will re eval post cath for transfer. Will repeat therapies in pm tomorrow post cath . ROS x10: The patient also complains of severely impaired mobility and activities of daily living. Otherwise no new problems with vision, hearing, nose, mouth, throat, dermal, cardiovascular, GI, , pulmonary, musculoskeletal, psychiatric or neurological. See Rehab H&P on Rehab chart dated . Vital signs:  /74   Pulse 71   Temp 97.6 °F (36.4 °C) (Temporal)   Resp 16   Ht 5' 4\" (1.626 m)   Wt 157 lb 14.4 oz (71.6 kg)   SpO2 97%   BMI 27.10 kg/m²   I/O:   PO/Intake:  fair PO intake, no problems observed or reported. Bowel/Bladder:  continent, no problems noted. General:  Patient is well developed, adequately nourished, non-obese and     well kempt. HEENT:    PERRLA, hearing intact to loud voice, external inspection of ear     and nose benign. Inspection of lips, tongue and gums benign  Musculoskeletal: No significant change in strength or tone. All joints stable. Inspection and palpation of digits and nails show no clubbing,       cyanosis or inflammatory conditions. Neuro/Psychiatric: Affect: flat but pleasant. Alert and oriented to person, place and     Situation with    cues. No significant change in deep tendon reflexes or     sensation  Lungs:  Diminished, CTA-B. Respiration effort is normal at rest.     Heart:   S1 = S2, RRR. No loud murmurs. Abdomen:  Soft, non-tender, no enlargement of liver or spleen. Extremities:  No significant lower extremity edema or tenderness. Skin:   Intact to general survey, no visualized or palpated problems.     Rehabilitation:  Physical therapy: Bed Mobility: Scooting: Stand by assistance    Transfers: Sit to Stand: Contact guard assistance, Stand by assistance  Stand to sit: Contact guard assistance, Stand by assistance, Ambulation 1  Surface: level tile  Device: Rolling Walker  Assistance: Contact guard assistance  Quality of Gait: bilateral foot drop, no LOB, reciprocal with decreased speed  Gait Deviations: Slow Lea, Decreased step length, Decreased step height  Distance: 100 feet  Comments: needs rest breaks during session for improved tolerance,      FIMS:  ,  , Assessment: Patient tolerates increased ambulatory distance without c/o SOB/ fatigue. Demonstrates good safety with transfers.     Occupational therapy:    ,  ,      Speech therapy:        Lab/X-ray studies reviewed, analyzed and discussed with patient and staff:   Recent Results (from the past 24 hour(s))   CBC    Collection Time: 02/01/21  5:11 AM   Result Value Ref Range    WBC 8.0 4.8 - 10.8 K/uL    RBC 3.87 (L) 4.20 - 5.40 M/uL    Hemoglobin 11.0 (L) 12.0 - 16.0 g/dL    Hematocrit 34.5 (L) 37.0 - 47.0 %    MCV 89.2 82.0 - 100.0 fL    MCH 28.5 27.0 - 31.3 pg    MCHC 32.0 (L) 33.0 - 37.0 %    RDW 14.1 11.5 - 14.5 %    Platelets 083 882 - 216 K/uL   Basic Metabolic Panel    Collection Time: 02/01/21  5:11 AM   Result Value Ref Range    Sodium 138 135 - 144 mEq/L    Potassium 4.0 3.4 - 4.9 mEq/L    Chloride 105 95 - 107 mEq/L    CO2 27 20 - 31 mEq/L    Anion Gap 6 (L) 9 - 15 mEq/L    Glucose 97 70 - 99 mg/dL    BUN 20 8 - 23 mg/dL    CREATININE 0.66 0.50 - 0.90 mg/dL    GFR Non-African American >60.0 >60    GFR  >60.0 >60    Calcium 8.8 8.5 - 9.9 mg/dL       Echo Complete 2d W Doppler W Color    Result Date: 1/27/2021  Transthoracic Echocardiography Report (TTE)  Demographics  Patient Name   Olivia Cook        Gender              Female                 Aby Strickland  Patient Number [id number]          Race                                                   Ethnicity Visit Number   [id number]         Room Number         A599  Corporate ID                     Date of Study       01/27/2021  Accession      [id number]        Referring Physician  Number  Date of Birth  07/12/1932        Sonographer         Rene STRONG  Age            80 year(s)        Interpreting        [facility])                                   Physician           Cardiology                                                       Rebekah Cormier MD Procedure Type of Study  TTE procedure:ECHO COMPLETE 2D W/DOP W/COLOR. Procedure Date Date: 01/27/2021 Start: 10:26 AM Study Location: Portable Technical Quality: Adequate visualization Indications:Congestive heart failure. Patient Status: Routine Height: 64 inches Weight: 165 pounds BSA: 1.8 m^2 BMI: 28.32 kg/m^2 BP: 108/77 mmHg  Conclusions  Summary  Mitral annular calcification is present. 1+ MR  Left ventricular ejection fraction is visually estimated at 40%. E/A flow reversal noted. Suggestive of diastolic dysfunction. Signature  ----------------------------------------------------------------  Electronically signed by Rebekah Cormier MD(Interpreting  physician) on 01/27/2021 11:37 AM  ----------------------------------------------------------------  Findings Left Ventricle Left ventricular ejection fraction is visually estimated at 40%. E/A flow reversal noted. Suggestive of diastolic dysfunction. Right Ventricle Normal right ventricle structure and function. Normal right ventricle systolic pressure. Left Atrium Moderately dilated left atrium. Right Atrium Moderately enlarged right atrium size. Mitral Valve Mitral annular calcification is present. 1+ MR Tricuspid Valve Normal tricuspid valve structure and function. 1-2+ TR RVSP 38 mmHg Aortic Valve Aortic valve leaflets are moderately thickened. No AS No AR Pulmonic Valve The pulmonic valve was not well visualized . Pericardial Effusion No evidence of pericardial effusion.  Pleural Effusion No evidence of pleural effusion. Aorta \ Miscellaneous The aorta is within normal limits. M-Mode Measurements (cm)  LVIDd: 4.39 cm                         LVIDs: 3.55 cm  IVSd: 1.43 cm                          IVSs: 1.55 cm  LVPWd: 1.5 cm                          LVPWs: 1.48 cm  Rt. Vent.  Dimension: 3.74 cm           AO Root Dimension: 3.62 cm                                         ACS: 1.25 cm                                         LA: 4.54 cm                                         LVOT: 2.04 cm Doppler Measurements:  AV Velocity:0.02 m/s                    MV Peak E-Wave: 0.85 m/s  AV Peak Gradient: 10.44 mmHg            MV Peak A-Wave: 0.87 m/s  AV Mean Gradient: 6.01 mmHg  AV Area (Continuity):1.83 cm^2  TR Velocity:2.62 m/s                    Estimated RAP:10 mmHg  TR Gradient:27.54 mmHg                  RVSP:37.54 mmHg Valves  Mitral Valve  Peak E-Wave: 0.85 m/s                 Peak A-Wave: 0.87 m/s                                        E/A Ratio: 0.97                                        Peak Gradient: 2.88 mmHg                                        Deceleration Time: 232.8 msec  Tissue Doppler  E' Septal Velocity: 0.07 m/s  E' Lateral Velocity: 0.08 m/s  Aortic Valve  Peak Velocity: 1.62 m/s                Mean Velocity: 1.15 m/s  Peak Gradient: 10.44 mmHg              Mean Gradient: 6.01 mmHg  Area (continuity): 1.83 cm^2           Area (2D): 1.8 cm^2  AV VTI: 28.8 cm  Cusp Separation: 1.25 cm  Tricuspid Valve  Estimated RVSP: 37.54 mmHg              Estimated RAP: 10 mmHg  TR Velocity: 2.62 m/s                   TR Gradient: 27.54 mmHg  Pulmonic Valve  Peak Velocity: 0.96 m/s           Peak Gradient: 3.66 mmHg                                    Estimated PASP: 37.54 mmHg  LVOT  Peak Velocity: 0.89 m/s              Mean Velocity: 0.57 m/s  Peak Gradient: 3.16 mmHg             Mean Gradient: 1.58 mmHg  LVOT Diameter: 2.04 cm               LVOT VTI: 16.15 cm Structures  Left Atrium  LA Dimension: 4.54 cm LA Area: 11.81 cm^2  LA/Aorta: 1.25  LA Volume/Index: 44.64 ml /25 m^2  Left Ventricle  Diastolic Dimension: 6.02 cm         Systolic Dimension: 5.96 cm  Septum Diastolic: 0.15 cm            Septum Systolic: 2.50 cm  PW Diastolic: 1.5 cm                 PW Systolic: 4.61 cm                                       FS: 19.1 %  LV EDV/LV EDV Index: 87.32 ml/49 m^2 LV ESV/LV ESV Index: 52.68 ml/29 m^2  EF Calculated: 39.7 %                LV Length: 6.12 cm  LVOT Diameter: 2.04 cm  Right Atrium  RA Systolic Pressure: 10 mmHg  Right Ventricle  Diastolic Dimension: 7.43 cm                                    RV Systolic Pressure: 58.14 mmHg Aorta/ Miscellaneous Aorta  Aortic Root: 3.62 cm  LVOT Diameter: 2.04 cm    Cta Chest W Wo Contrast    Result Date: 1/26/2021  EXAMINATION:  CHEST CTA WITH CONTRAST (PULMONARY EMBOLISM PROTOCOL) CLINICAL HISTORY:   PE   I50.43 Acute on chronic combined systolic and diastolic CHF (congestive heart failure) ([facility]) ICD10. Technique:  Spiral axial CT acquisition of the chest from the thoracic inlet to the upper abdomen following IV contrast.  2-D images were reconstructed in the sagittal and coronal planes. 3-D MIPS images were generated in the coronal and axial planes. Images were reviewed on the PACS workstation. All images including the 3-D MIPS were personally archived. Contrast:  IV administration of 100 ml Isovue 300 All CT scans at this facility use dose modulation, iterative reconstruction, and/or weight based dosing when appropriate to reduce radiation dose to as low as reasonably achievable. Comparison:  CTA chest 3/15/2019  RESULT: Limitations: Motion artifact.  Evaluation for thromboembolic disease:      - Right heart chambers:  No thromboembolic disease.      - Main pulmonary arteries:  No thromboembolic disease.      - Lobar pulmonary arteries:  No thromboembolic disease.        - Segmental pulmonary arteries:  No thromboembolic disease.        - Subsegmental pulmonary arteries:  Not well visualized due to motion. Lines, tubes, and devices:  None. Lung parenchyma and pleura: Tortuous course of the trachea. Central airways appear grossly patent. Moderate left and small right pleural effusions with associated atelectasis. Limitations in evaluation of the lung parenchyma secondary to motion. Other areas of probable scarring/atelectasis. No pneumothorax. Thoracic inlet, heart, and mediastinum: Visualized thyroid unremarkable. No axillary, mediastinal, or hilar lymphadenopathy. Ascending thoracic aorta aneurysmal, measuring around 5.0 cm, similar to prior. Descending thoracic aorta aneurysmal and measures  up to 4.8 cm, also similar to prior. Normal pulmonary artery size. Cardiomegaly, similar to prior Scattered coronary artery calcifications. Small pericardial effusion. Small hiatal hernia. Bones and soft tissues:  No destructive bone lesion or acute osseous findings. Osteopenia. Scoliosis and kyphosis and multilevel degenerative changes, grossly similar to prior. Chest wall unremarkable. Upper abdomen:  No acute abnormality in the imaged upper abdomen. Reflux of contrast into the hepatic veins, associated with right heart failure. Lower neck: Unremarkable. No CT evidence of pulmonary embolism. Moderate left and small right pleural effusions. Cardiomegaly and small pericardial effusion. Reflux of contrast into the hepatic veins, associated with right heart failure. 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 No lung consolidative opacity. Areas of probable atelectasis/scarring.      Xr Chest Portable    Result Date: 1/26/2021  EXAMINATION: Portable AP ERECT view of the chest. CLINICAL HISTORY:  SOB , Negative Covid-19 test. DATE: 1/25/2021 5:41 PM COMPARISONS: 7/24/2017 FINDINGS: The heart is moderately enlarged, similar to prior exam.  Prominent interstitial markings, consistent with increasing Central vascular congestion. The costophrenic angles are blunted secondary to pleural effusions bilaterally, left greater than  right. No pneumothorax. There are infiltrates/atelectasis within lung bases, left greater than right. There are moderate degenerative changes in spine. CARDIAC ENLARGEMENT WITH CENTRAL VESSEL CONGESTION AND BILATERAL PLEURAL EFFUSIONS, POSSIBLE CONGESTIVE HEART FAILURE. BIBASILAR INFILTRATES/ATELECTASIS, LEFT GREATER THAN RIGHT. Previous extensive, complex labs, notes and diagnostics reviewed and analyzed. ALLERGIES:    Allergies as of 01/25/2021 - Review Complete 01/25/2021   Allergen Reaction Noted    Latex  07/19/2017    Tape [adhesive tape]  06/28/2016      (please also verify by checking [facility])     Complex Physical Medicine & Rehab Issues Assess & Plan:   1. Severe abnormality of gait and mobility and impaired self-care and ADL's secondary to progressive Cardiac rehab CHF exacerbation . Functional and medical status reassessed regarding patients ability to participate in therapies and patient found to be able to participate in acute intensive comprehensive inpatient rehabilitation program including PT/OT to improve balance, ambulation, ADLs, and to improve the P/AROM. Therapeutic modifications regarding activities in therapies, place, amount of time per day and intensity of therapy made daily. In bed therapies or bedside therapies prn.   2. Bowel and Bladder dysfunction  :  frequent toileting, ambulate to bathroom with assistance, check post void residuals. Check for C.difficile x1 if >2 loose stools in 24 hours, continue bowel & bladder program.  Monitor bowel and bladder function. Lactinex 2 PO every AC. MOM prn, Brown Bomb prn, Glycerin suppository prn, enema prn.   3. Moderate   pain as well as generalized OA pain: reassess pain every shift and prior to and after each therapy session, give prn Tylenol and   , modalities prn in therapy, masage, Lidoderm, K-pad prn. Consider scheduled AM pain meds. 4. Skin healing and breakdown risk:  continue pressure relief program.  Daily skin exams and reports from nursing. 5. Severe fatigue due to nutritional and hydration deficiency: Add and titrate vitamin B12 vitamin D and CoQ10 continue to monitor I&Os, calorie counts prn, dietary consult prn.  6. Acute episodic insomnia with situational adjustment disorder:  prn Ambien, monitor for day time sedation. 7. Falls risk elevated:  patient to use call light to get nursing assistance to get up, bed and chair alarm. 8. Elevated DVT risk: progressive activities in PT, continue prophylaxis PORTILLO hose, elevation and  see mar . 9. Complex discharge planning:  Weekly team meeting every Monday Thursday to assess progress towards goals, discuss and address social, psychological and medical comorbidities and to address difficulties they may be having progressing in therapy. Patient and family education is in progress. The patient is to follow-up with their family physician after discharge.         Complex Active General Medical Issues that complicate care Assess & Plan:    Patient Active Problem List   Diagnosis    Lumbar stenosis with neurogenic claudication    Acquired scoliosis    Osteoporosis    Charcot Arleen Tooth muscular atrophy    Excess weight    Osteoarthritis of lumbar spine with myelopathy    Osteoarthritis    Myelopathy ([facility] [address]    Impaired mobility and activities of daily living due to [employer], severe lumbar canal stenosis s/p Rt and Rui L3-4, L4-5 microdissection and decompression, [facility]    Headache    Depression    Ascending aortic aneurysm (HCC)    Descending aortic aneurysm (HCC)    Dizziness    Shortness of breath    Essential hypertension    Acute on chronic combined systolic and diastolic CHF (congestive heart failure) (HCC)    Gait abnormality   1. appropriate for rehab once medically cleared  Kentrell LIM Susanna Baeza D.O., PM&R     Attending    [address]

## 2021-02-02 NOTE — FLOWSHEET NOTE
AM assessment completed. Patient resting in bed at this time with TV on. Denies any chest pain at this time. Remains NSR on the monitor. No edema noted. Pedal pulses palpable. Shortness of breath noted with exertion. Lungs are diminished bilaterally. SATS 96% on 3L NC. She is A/Ox3 but primarily speaks Israeli. She can be up with a 1 person assist/walker in the room. Denies any pain with elimination. Skin remains warm, dry and intact. Dressing to right wrist remains clean, dry and intact and site remains stable. #20g SL to left hand, flushed and patent. Vital signs stable and AM medications provided. Call light remains in reach.  Electronically signed by Rajwinder Wagner RN on 2/2/2021 at 12:20 PM

## 2021-02-02 NOTE — FLOWSHEET NOTE
Report called to [facility] Loredo on rehab. Patient and her daughter are aware of transfer to room 249 after she is finished eating dinner. Telemetry monitor removed. Call light remains in reach.  Electronically signed by Farnaz Lyn RN on 2/2/2021 at 5:13 PM

## 2021-02-02 NOTE — FLOWSHEET NOTE
Transport present to take patient to room 249 via wheelchair.  Electronically signed by John Hemphill RN on 2/2/2021 at 5:55 PM

## 2021-02-02 NOTE — DISCHARGE INSTR - COC
Continuity of Care Form    Patient Name: Susana Dodson   :  1932  MRN:  [id number]    Admit date:  2021  Discharge date:  21    Code Status Order: Full Code   Advance Directives:   [facility] Documentation     Date/Time Healthcare Directive Type of Healthcare Directive Copy in [address] Agent's Name Healthcare Agent's Phone Number    [phone]  Yes, patient has an advance directive for healthcare treatment  Durable power of  for health care  --  Adult Children  Sherry Brian  [phone]          Admitting Physician:  Rob Sánchez DO  PCP: Sandeep Dunn MD    Discharging Nurse: [facility] Unit/Room#: P983/T010-06  Discharging Unit Phone Number: [phone]    Emergency Contact:   Extended Emergency Contact Information  Primary Emergency Contact: Ivette [address] Phone: [phone]  Work Phone: [phone]  Mobile Phone: [phone]  Relation: Child  Secondary Emergency Contact: Ruddy Beckwith Phone: [phone]  Relation: Child    Past Surgical History:  Past Surgical History:   Procedure Laterality Date    JOINT REPLACEMENT      THORACENTESIS Left 2021    total of 755 cc removed per Dr Ceasar Townsend specimen sent to lab       Immunization History: There is no immunization history on file for this patient.     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    Osteoarthritis M19.90    Myelopathy ([address] G95.9    Impaired mobility and activities of daily living due to [employer], severe lumbar canal stenosis s/p Rt and Rui L3-4, L4-5 microdissection and decompression, [facility] admit 16 Z74.09, Z78.9    Headache R51.9  Depression F32.9    Ascending aortic aneurysm (HCC) I71.2    Descending aortic aneurysm (HCC) I71.9    Dizziness R42    Shortness of breath R06.02    Essential hypertension I10    Acute on chronic combined systolic and diastolic CHF (congestive heart failure) ([facility]) I50.43    Gait abnormality R26.9       Isolation/Infection:   Isolation          No Isolation        Patient Infection Status     Infection Onset Added Last Indicated Last Indicated By Review Planned Expiration Resolved Resolved By    None active    Resolved    COVID-19 Rule Out 02/02/21 02/02/21 02/02/21 COVID-19 (Ordered)   02/02/21 Rule-Out Test Resulted    COVID-19 Rule Out 01/25/21 01/25/21 01/26/21 COVID-19 (Ordered)   01/26/21 Rule-Out Test Resulted    COVID-19 Rule Out 01/25/21 01/25/21 01/25/21 COVID-19 (Ordered)   01/25/21 Rule-Out Test Resulted          Nurse Assessment:  Last Vital Signs: BP (!) 104/58   Pulse 83   Temp 97.6 °F (36.4 °C) (Oral)   Resp 20   Ht 5' 4\" (1.626 m)   Wt 157 lb 14.4 oz (71.6 kg)   SpO2 95%   BMI 27.10 kg/m²     Last documented pain score (0-10 scale): Pain Level: 0  Last Weight:   Wt Readings from Last 1 Encounters:   02/01/21 157 lb 14.4 oz (71.6 kg)     Mental Status:  oriented, alert, coherent, logical, thought processes intact and able to concentrate and follow conversation    IV Access:  - Peripheral IV - site  left hand, insertion date: 1/31/21    Nursing Mobility/ADLs:  Walking   Assisted  Transfer  Assisted  Bathing  Assisted  Dressing  Assisted  Toileting  Assisted  Feeding  Independent  Med Admin  Assisted  Med Delivery   whole    Wound Care Documentation and Therapy:        Elimination:  Continence:   · Bowel: Yes  · Bladder: taylor catheter  Urinary Catheter:  Insertion Date: 1/25/21   Colostomy/Ileostomy/Ileal Conduit: No       Date of Last BM: 2/1/21    Intake/Output Summary (Last 24 hours) at 2/2/2021 1710  Last data filed at 2/2/2021 0601  Gross per 24 hour   Intake 240 ml   Output 750 ml   Net -510 ml     I/O last 3 completed shifts: In: 240 [P.O.:240]  Out: 1200 [Urine:1200]    Safety Concerns: At Risk for Falls    Impairments/Disabilities:      Hearing    Nutrition Therapy:  Current Nutrition Therapy:   - Oral Diet:  Cardiac    Routes of Feeding: Oral  Liquids: Thin Liquids  Daily Fluid Restriction: no  Last Modified Barium Swallow with Video (Video Swallowing Test): not done    Treatments at the Time of Hospital Discharge:   Respiratory Treatments: ***  Oxygen Therapy:  is on oxygen at 3 L/min per nasal cannula.   Ventilator:    - No ventilator support    Rehab Therapies: Physical Therapy and Occupational Therapy  Weight Bearing Status/Restrictions: No weight bearing restirctions  Other Medical Equipment (for information only, NOT a DME order):  walker  Other Treatments: ***    Patient's personal belongings (please select all that are sent with patient):  {[facility] DME Belongings:[id number]}    RN SIGNATURE:  Electronically signed by Fernandez Finch RN on 2/2/21 at 5:11 PM EST    CASE MANAGEMENT/SOCIAL WORK SECTION    Inpatient Status Date: ***    Readmission Risk Assessment Score:  Readmission Risk              Risk of Unplanned Readmission:        14           Discharging to Facility/ Agency   · Name:   · Address:  · Phone:  · Fax:    Dialysis Facility (if applicable)   · Name:  · Address:  · Dialysis Schedule:  · Phone:  · Fax:    / signature: {Esignature:[id number]}    PHYSICIAN SECTION    Prognosis: {Prognosis:2342759194}    Condition at Discharge: [facility] Patient Condition:[id number]}    Rehab Potential (if transferring to Rehab): {Prognosis:5140208199}    Recommended Labs or Other Treatments After Discharge: ***    Physician Certification: I certify the above information and transfer of Annetta Morrow  is necessary for the continuing treatment of the diagnosis listed and that she requires {Admit to Appropriate Level of Care:04758} for {GREATER/LESS:651158619} 30 days.     Update Admission H&P: {CHP DME Changes in XXMEI:043121156}    PHYSICIAN SIGNATURE:  {Esignature:[id number]}

## 2021-02-02 NOTE — CARE COORDINATION
Spoke with Kelley PIÑA. Patient possible transfer to Acute Rehab today, pending therapy notes s/p cardiac cath on 2/1/21.  Electronically signed by [facility], RN on 2/2/21 at 11:28 AM EST

## 2021-02-02 NOTE — PROGRESS NOTES
Physical Therapy Med Surg Daily Treatment Note  Facility/Department: [address] [facility]  Room: [place] [id number]       NAME: Mercedes Ritter  : 1932 (80 y.o.)  MRN: [id number]  CODE STATUS: Full Code    Date of Service: 2021    Patient Diagnosis(es): Acute on chronic combined systolic and diastolic CHF (congestive heart failure) ([address] [I50.43]   Chief Complaint   Patient presents with    Shortness of Breath     sob last 3 days     Patient Active Problem List    Diagnosis Date Noted    Impaired mobility and activities of daily living due to NTSCI, severe lumbar canal stenosis s/p Rt and Rui L3-4, L4-5 microdissection and decompression, [facility] admit 16      Priority: High    Lumbar stenosis with neurogenic claudication 2016     Priority: Medium    Gait abnormality 2021    Acute on chronic combined systolic and diastolic CHF (congestive heart failure) ([address] 2021    Essential hypertension 2018    Shortness of breath     Dizziness     Ascending aortic aneurysm ([address] 2017    Descending aortic aneurysm ([address] 2017    Osteoarthritis     Myelopathy ([address]     Headache     Depression     Acquired scoliosis 2016    Osteoporosis 2016    Charcot Arleen Tooth muscular atrophy 2016    Excess weight 2016    Osteoarthritis of lumbar spine with myelopathy 2016        Past Medical History:   Diagnosis Date    Ascending aortic aneurysm ([address] 2017    Charcot Arleen Tooth muscular atrophy     Depression     Descending aortic aneurysm ([address] 2017    Essential hypertension 2018    Headache     HTN (hypertension)     Impaired mobility and activities of daily living     Lumbar stenosis with neurogenic claudication     Myelopathy ([address]     Osteoarthritis      Past Surgical History:   Procedure Laterality Date    JOINT REPLACEMENT      THORACENTESIS Left 2021    total of 755 cc removed per Dr Jenene Councilman specimen sent to lab       Restrictions   fall risk    SUBJECTIVE    Pt reports that she has need for BM but has had difficulty and only had gas. Pre-Session Pain Report   Denies        Post-Session Pain Report   Denies     OBJECTIVE   Orientation  Overall Orientation Status: Within Functional Limits    Bed mobility  Rolling to Left: Stand by assistance  Rolling to Right: Stand by assistance  Supine to Sit: Stand by assistance  Sit to Supine: Stand by assistance  Comment: HOB, increased time noted with use of bedrails this date. Transfers  Sit to Stand: Stand by assistance  Stand to sit: Stand by assistance  Comment: with 2ww. x 2 transfers from EOB and x1 transfer from toilet    Ambulation  Ambulation?: Yes  Ambulation 1  Device: Rolling Walker  Assistance: Stand by assistance  Distance: 55ft x 3  Comments: standing rest break between trials. Pt reports mild dyspnea. Trialed gait on RA with SPO2 94% at conclusion of gait. Balance  Sitting - Static: Good  Sitting - Dynamic: Good  Standing - Static: Fair;+  Standing - Dynamic: Fair     Activity Tolerance  Activity Tolerance: Patient Tolerated treatment well       ASSESSMENT   Body structures, Functions, Activity limitations: Decreased functional mobility ; Decreased safe awareness;Decreased balance;Decreased endurance;Decreased strength;Decreased posture  Assessment: Pt reports that her SOB is mildly improving. Pt demonstrates improved indep with functional mobiity with use of 2ww. Pt would benefit from Pt to further improve indep and facilitate safe D/C home at highest indep level.      Discharge Recommendations:  Continue to assess pending progress    Goals  Long term goals  Long term goal 1: indep bed mobility  Long term goal 2: indep bed transfers  Long term goal 3: indep gait with ww 50 feet  Long term goal 4: SBA 4 stairs    PLAN    Times per week: 3-6  Safety Devices  Type of devices: Bed alarm in place, Call light within reach, Left in bed     [facility] (6 CLICK)

## 2021-02-02 NOTE — FLOWSHEET NOTE
PM assessment completed. VSS. Right radial dressing dry and intact. No sign of hematoma or bruising present. Patient resting in bed. Voices no needs at this time.

## 2021-02-02 NOTE — CARE COORDINATION
Spoke with patient's son Jesusita Kelley, all questions answered and still in agreement with plan for transfer to Acute Rehab. Case reviewed with PM&R Dr Alayna Wu and Felecia Askew for rehab today. Patient can transfer to room 249 when medically cleared for discharge pending negative Covid. Luis Gresham 99 C3 notified.  Electronically signed by Sushil Mcguire RN on 2/2/21 at 3:35 PM EST

## 2021-02-03 ENCOUNTER — APPOINTMENT (OUTPATIENT)
Dept: GENERAL RADIOLOGY | Age: 86
DRG: 091 | End: 2021-02-03
Attending: PHYSICAL MEDICINE & REHABILITATION
Payer: MEDICARE

## 2021-02-03 LAB
ANION GAP SERPL CALCULATED.3IONS-SCNC: 9 MEQ/L (ref 9–15)
BUN BLDV-MCNC: 19 MG/DL (ref 8–23)
CALCIUM SERPL-MCNC: 8.6 MG/DL (ref 8.5–9.9)
CHLORIDE BLD-SCNC: 106 MEQ/L (ref 95–107)
CO2: 26 MEQ/L (ref 20–31)
CREAT SERPL-MCNC: 0.63 MG/DL (ref 0.5–0.9)
GFR AFRICAN AMERICAN: >60
GFR NON-AFRICAN AMERICAN: >60
GLUCOSE BLD-MCNC: 104 MG/DL (ref 70–99)
HCT VFR BLD CALC: 30.9 % (ref 37–47)
HEMOGLOBIN: 10.3 G/DL (ref 12–16)
MCH RBC QN AUTO: 29 PG (ref 27–31.3)
MCHC RBC AUTO-ENTMCNC: 33.4 % (ref 33–37)
MCV RBC AUTO: 86.9 FL (ref 82–100)
PATH CONSULT FLUID: NORMAL
PDW BLD-RTO: 14 % (ref 11.5–14.5)
PLATELET # BLD: 275 K/UL (ref 130–400)
POTASSIUM SERPL-SCNC: 4.1 MEQ/L (ref 3.4–4.9)
RBC # BLD: 3.56 M/UL (ref 4.2–5.4)
SODIUM BLD-SCNC: 141 MEQ/L (ref 135–144)
WBC # BLD: 8.6 K/UL (ref 4.8–10.8)

## 2021-02-03 PROCEDURE — 6370000000 HC RX 637 (ALT 250 FOR IP): Performed by: PHYSICAL MEDICINE & REHABILITATION

## 2021-02-03 PROCEDURE — 80048 BASIC METABOLIC PNL TOTAL CA: CPT

## 2021-02-03 PROCEDURE — 85027 COMPLETE CBC AUTOMATED: CPT

## 2021-02-03 PROCEDURE — 97162 PT EVAL MOD COMPLEX 30 MIN: CPT

## 2021-02-03 PROCEDURE — 94667 MNPJ CHEST WALL 1ST: CPT

## 2021-02-03 PROCEDURE — 2700000000 HC OXYGEN THERAPY PER DAY

## 2021-02-03 PROCEDURE — 97166 OT EVAL MOD COMPLEX 45 MIN: CPT

## 2021-02-03 PROCEDURE — 97535 SELF CARE MNGMENT TRAINING: CPT

## 2021-02-03 PROCEDURE — 94150 VITAL CAPACITY TEST: CPT

## 2021-02-03 PROCEDURE — 71045 X-RAY EXAM CHEST 1 VIEW: CPT

## 2021-02-03 PROCEDURE — 6370000000 HC RX 637 (ALT 250 FOR IP): Performed by: INTERNAL MEDICINE

## 2021-02-03 PROCEDURE — APPNB30 APP NON BILLABLE TIME 0-30 MINS: Performed by: PHYSICIAN ASSISTANT

## 2021-02-03 PROCEDURE — 1180000000 HC REHAB R&B

## 2021-02-03 PROCEDURE — 36415 COLL VENOUS BLD VENIPUNCTURE: CPT

## 2021-02-03 PROCEDURE — 99233 SBSQ HOSP IP/OBS HIGH 50: CPT | Performed by: INTERNAL MEDICINE

## 2021-02-03 PROCEDURE — 99223 1ST HOSP IP/OBS HIGH 75: CPT | Performed by: INTERNAL MEDICINE

## 2021-02-03 RX ADMIN — POTASSIUM CHLORIDE 20 MEQ: 20 TABLET, EXTENDED RELEASE ORAL at 09:04

## 2021-02-03 RX ADMIN — LORAZEPAM 0.5 MG: 0.5 TABLET ORAL at 13:21

## 2021-02-03 RX ADMIN — LEVOTHYROXINE SODIUM 88 MCG: 88 TABLET ORAL at 07:06

## 2021-02-03 RX ADMIN — ASPIRIN 81 MG: 81 TABLET, CHEWABLE ORAL at 09:04

## 2021-02-03 RX ADMIN — DILTIAZEM HYDROCHLORIDE 120 MG: 120 CAPSULE, COATED, EXTENDED RELEASE ORAL at 09:04

## 2021-02-03 RX ADMIN — PANTOPRAZOLE SODIUM 40 MG: 40 TABLET, DELAYED RELEASE ORAL at 07:06

## 2021-02-03 RX ADMIN — CITALOPRAM HYDROBROMIDE 20 MG: 20 TABLET ORAL at 09:04

## 2021-02-03 RX ADMIN — LOSARTAN POTASSIUM 25 MG: 25 TABLET, FILM COATED ORAL at 09:10

## 2021-02-03 RX ADMIN — RIVAROXABAN 20 MG: 20 TABLET, FILM COATED ORAL at 17:50

## 2021-02-03 RX ADMIN — FUROSEMIDE 20 MG: 20 TABLET ORAL at 09:04

## 2021-02-03 RX ADMIN — SIMETHICONE 80 MG: 80 TABLET, CHEWABLE ORAL at 09:04

## 2021-02-03 ASSESSMENT — ENCOUNTER SYMPTOMS
NAUSEA: 0
VOMITING: 0
COLOR CHANGE: 0
SHORTNESS OF BREATH: 0
CHEST TIGHTNESS: 0

## 2021-02-03 ASSESSMENT — PAIN DESCRIPTION - FREQUENCY: FREQUENCY: INTERMITTENT

## 2021-02-03 ASSESSMENT — PAIN DESCRIPTION - PAIN TYPE: TYPE: ACUTE PAIN

## 2021-02-03 ASSESSMENT — PAIN SCALES - GENERAL: PAINLEVEL_OUTOF10: 0

## 2021-02-03 NOTE — CARE COORDINATION
Social/Functional Status:  Social/Functional History  Lives With: Alone  Type of Home: House  Home Layout: One level  Home Access: Stairs to enter with rails  Entrance Stairs - Number of Steps: 2  Entrance Stairs - Rails: Both  Bathroom Shower/Tub: Tub/Shower unit  Bathroom Equipment: Grab bars in shower, Shower chair  Home Equipment: Rolling walker, Cane(Pt infrequemtly uses DME for ambulation and prefers to furniture walk in home)  ADL Assistance: [address] [place] [address] Avenue: Independent  Homemaking Responsibilities: Yes  Ambulation Assistance: Independent  Transfer Assistance: Independent  Additional Comments: Son stops over 2 times daily    Spoke with patient and explained role in the team. Patient questions answered appropriately. Explained discharge process. Patient stated understanding. Patient lives alone, son local for support. Patient has a ww and cane. Son helps as needed.  Electronically signed by Augie Warren RN on 2/3/2021 at 3:39 PM

## 2021-02-03 NOTE — CONSULTS
Hospitalist Progress Note  2/3/2021 12:03 PM    Assessment and Plan:   1. Generalized weakness, Gait instability and Decreased  Functional Status secondary to new onset cardiomyopathy: S/p cardiac cath. Cardiology managing. EF 40%. Cardiac cath showed moderate mid LAD stenosis with negative IFR. Fall precautions. PT OT to evaluate. Maximize nutrition status. Assessing if needs DME at home. SW on board. Dr. Amilcar Chris managing rehab plan. 2. PAF: Cardiology following. Rate controlled. Goal K and Mg 4.0 and 2.0, respectively. On Long term anticoagulation  3. HTN: Cardiology managing. Cozaar on hold. 4. CAD: Cardiology following and managing. 5. Pleural effusion: S/p thoracentesis. Concerned for malignancy. Pulmonary consult. Will need frequent monitoring. May need VATS and pleural biopsy. 6. Combined systolic diastolic heart failure: Cardiology following. On lasix. Goal K and Mg 4.0 and 2.0, respectively. On ASA, BB,  ACEI/ARB. Monitor weights. Monitor for signs/ symptoms of volume overload. Elevate lower exts while at rest  7. Thoracic Aortic Aneurysm: Stable. Cardiology following. 8. Hypothyroidism: continue synthroid  9. Depression: Continue celexa  10. 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 PT/OT  11. Bowel Regimen and GI PPx: stool softners PRN ordered with hold parameters for loose stools or diarrhea. On antiacid  12. Diet: DIET LOW SODIUM 2 GM;  13. Dietary Nutrition Supplements: Standard High Calorie Oral Supplement  14. Advance Directive: Full Code   15. Nutrition status: Supplemental Vitamins ordered. Dietitian assessment  16. Vaccinations: Immunization records reviewed. If has not received appropriate vaccinations, will order to be given prior to discharge. 17. DVT prophylaxis: On xarelto  18. Discharge planning: MARCELLUS on board. Will discharge once medically stable and cleared by all consultants. 19. High Risk Readmission Screening Tool Score Noted. Additionally, the following hospital problems were addressed: Active Problems:    Impaired mobility and activities of daily living due to NTSCI, severe lumbar canal stenosis s/p Rt and Rui L3-4, L4-5 microdissection and decompression, [facility] admit 6/23/16  Resolved Problems:    * No resolved hospital problems. *      ** Total time spent reviewing medical records, evaluating patient, speaking with RN's and consultants where I was focused exclusively on this patient: 80 minutes. This time is excluding time spent performing procedures or significant events occurring earlier or later in the day requiring my attention and focus. Subjective:   Admit Date: 2/2/2021  PCP: Simone Moss MD    No acute events overnight. Afebrile . No new complaints. Pt denies chest pain, SOB, N/V, fevers or chills. Wants taylor catheter removed. Objective:     Vitals:    02/02/21 2056 02/03/21 0904   BP: 115/73 (!) 93/58   Pulse: 83 77     General appearance: A+ O x2- 3. No conversational dyspnea noted. Tribal  Dentition intact. Answers questions appropriately  Lungs: CTAB, Diminished. No exp wheezes, No rales, No retractions; No use of accessory muscles  Heart:  S1, S2 normal, RRR, no MRG appreciated  Abdomen: (+) BS, soft, non-tender; non distended no guarding or rigidity. Extremities:  no cyanosis, trace edema bilat lower exts, no calf tenderness bilaterally.  Dry skin noted   : taylor catheter,  Urine dark      Medications:      sodium chloride flush  10 mL Intravenous 2 times per day    aspirin  81 mg Oral Daily    carvedilol  3.125 mg Oral BID    citalopram  20 mg Oral Daily    dilTIAZem  120 mg Oral Daily    furosemide  20 mg Oral Daily    levothyroxine  88 mcg Oral Daily    [Held by provider] losartan  25 mg Oral Daily    potassium chloride  20 mEq Oral Daily with breakfast    rivaroxaban  20 mg Oral Daily    pantoprazole  40 mg Oral QAM AC LABS Reviewed    IMAGING Reviewed    [address] [facility]

## 2021-02-03 NOTE — PROGRESS NOTES
Progress Note  Patient: Macey Smith  Unit/Bed: R108/F328-58  YOB: 1932  MRN: [id number]  Acct: [de-identified]   Admitting Diagnosis: Impaired mobility [Z74.09]  Date:  2/2/2021  Hospital Day: 1    Chief Complaint:  Impaired mobility/NICMP/Recent PA-fib RVR/nonobstructive CAD    Subjective      2/3/21: Patient was transferred to rehab yesterday following hospitalization for acute decompensated systolic heart failure and new nonischemic cardiomyopathy with EF of 40%. She underwent left heart catheterization on 2/1/2021 which showed 60% mid LAD lesion with negative IFR. During her hospitalization she was noted to have new onset paroxysmal A. fib RVR and was initiated on oral anticoagulation with Xarelto. Also, during her hospitalization, she was noted to have moderate sized left pleural effusion and underwent left thoracentesis on 1/28/2021 with aspiration of 755 mL pleural fluid. Analysis of pleural fluid showed exudative lymphocyte predominant pleural effusion with main concern for malignancy which will need monitored as outpatient with repeat CT chest in 6 to 8 weeks and outpatient follow-up with pulmonology for cytology results. She was optimized on medical therapy and stable for transfer to rehab yesterday. Patient resting comfortably in bed in no acute distress. Complaining of dizziness this morning and has a cool washcloth on her forehead currently. Denies chest pain or shortness of breath. Zeng catheter in place draining dark/turbid urine. A.m. labs reviewed. Renal function and electrolytes stable. Hemoglobin low but stable at 10.3. Review of Systems:   Review of Systems   Constitutional: Negative for activity change. HENT: Negative for congestion. Respiratory: Negative for chest tightness and shortness of breath. Cardiovascular: Negative for chest pain, palpitations and leg swelling. Gastrointestinal: Negative for nausea and vomiting. Genitourinary:         Zeng cathter in place   Skin: Negative for color change and pallor. Neurological: Positive for dizziness. Negative for syncope. Psychiatric/Behavioral: Negative for agitation and behavioral problems. Physical Examination:    BP (!) 93/58   Pulse 77    Physical Exam  Constitutional:       General: She is not in acute distress. Appearance: Normal appearance. HENT:      Head: Normocephalic and atraumatic. Neck:      Musculoskeletal: Normal range of motion and neck supple. Cardiovascular:      Rate and Rhythm: Normal rate and regular rhythm. Pulmonary:      Effort: Pulmonary effort is normal. No respiratory distress. Breath sounds: No wheezing, rhonchi or rales. Abdominal:      Palpations: Abdomen is soft. Tenderness: There is no abdominal tenderness. Musculoskeletal: Normal range of motion. Right lower leg: No edema. Left lower leg: No edema. Skin:     General: Skin is warm and dry. Neurological:      General: No focal deficit present. Mental Status: She is alert. Cranial Nerves: No cranial nerve deficit.    Psychiatric:         Mood and Affect: Mood normal.         Behavior: Behavior normal.         LABS:  CBC:   Lab Results   Component Value Date    WBC 8.6 02/03/2021    RBC 3.56 02/03/2021    HGB 10.3 02/03/2021    HCT 30.9 02/03/2021    MCV 86.9 02/03/2021    MCH 29.0 02/03/2021    MCHC 33.4 02/03/2021    RDW 14.0 02/03/2021     02/03/2021     CBC with Differential:   Lab Results   Component Value Date    WBC 8.6 02/03/2021    RBC 3.56 02/03/2021    HGB 10.3 02/03/2021    HCT 30.9 02/03/2021     02/03/2021    MCV 86.9 02/03/2021    MCH 29.0 02/03/2021    MCHC 33.4 02/03/2021    RDW 14.0 02/03/2021    LYMPHOPCT 9.0 01/25/2021    MONOPCT 8.7 01/25/2021    BASOPCT 0.4 01/25/2021    MONOSABS 1.0 01/25/2021    LYMPHSABS 1.0 01/25/2021    EOSABS 0.1 01/25/2021    BASOSABS 0.0 01/25/2021     CMP:    Lab Results   Component Value Date     02/03/2021    K 4.1 02/03/2021    K 4.1 01/25/2021     02/03/2021    CO2 26 02/03/2021    BUN 19 02/03/2021    CREATININE 0.63 02/03/2021    GFRAA >60.0 02/03/2021    LABGLOM >60.0 02/03/2021    GLUCOSE 104 02/03/2021    PROT 7.3 01/25/2021    LABALBU 3.5 01/25/2021    CALCIUM 8.6 02/03/2021    BILITOT 0.4 01/25/2021    ALKPHOS 57 01/25/2021    AST 23 01/25/2021    ALT 20 01/25/2021     BMP:    Lab Results   Component Value Date     02/03/2021    K 4.1 02/03/2021    K 4.1 01/25/2021     02/03/2021    CO2 26 02/03/2021    BUN 19 02/03/2021    LABALBU 3.5 01/25/2021    CREATININE 0.63 02/03/2021    CALCIUM 8.6 02/03/2021    GFRAA >60.0 02/03/2021    LABGLOM >60.0 02/03/2021    GLUCOSE 104 02/03/2021     Magnesium:    Lab Results   Component Value Date    MG 1.9 10/02/2020     Troponin:    Lab Results   Component Value Date    TROPONINI <0.010 01/26/2021       Radiology:      CTA Chest 1/25/21:  Impression       No CT evidence of pulmonary embolism.       Moderate left and small right pleural effusions.       Cardiomegaly and small pericardial effusion. Reflux of contrast into the hepatic veins, associated with right heart failure.       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       No lung consolidative opacity. Areas of probable atelectasis/scarring. Echocardiogram 1/27/21:  Conclusions   Summary   Mitral annular calcification is present. 1+ MR   Left ventricular ejection fraction is visually estimated at 40%. E/A flow reversal noted. Suggestive of diastolic dysfunction. Signature   ----------------------------------------------------------------   Electronically signed by Anabella Hubbard MD(Interpreting   physician) on 01/27/2021 11:37 AM   ----------------------------------------------------------------   Findings  Left Ventricle  Left ventricular ejection fraction is visually estimated at 40%.   E/A anticoagulation with Xarelto 20 mg p.o. daily  2. Cardiac/less than 2 g sodium diet recommended  3. Maintain potassium greater than 4, magnesium greater than 2  4. GI/DVT prophylaxis  5. Will need outpatient follow-up with pulmonology upon discharge given recent left thoracentesis for moderate pleural effusion with suspicion of malignancy and repeat CT of the chest needed as outpatient  6. Will need at least annual CT chest for reevaluation of ascending and descending thoracic aortic aneurysms which were stable in size on CTA of the chest from 1/25/2021 compared to prior CT of the chest from 3/15/2019  7. Consider outpatient event monitor for further evaluation of paroxysmal atrial fibrillation  8. Will need outpatient follow-up with cardiology upon discharge  9. Further recommendations to follow    Electronically signed by ASHLEY Victor on 2/3/2021 at 9:49 AM      Attending Supervising [de-identified] Attestation Statement  The patient is a 80 y.o. female. I have performed a history and physical examination of the patient. I discussed the case with the physician assistant. I reviewed the patient's Past Medical History, Past Surgical History, Medications, and Allergies.      Physical Exam:  Vitals:    02/02/21 2056 02/03/21 0904   BP: 115/73 (!) 93/58   Pulse: 83 77       Review of Systems - Respiratory ROS: no cough, shortness of breath, or wheezing  Cardiovascular ROS: no chest pain or dyspnea on exertion  Gastrointestinal ROS: no abdominal pain, change in bowel habits, or black or bloody stools    Pulmonary/Chest: clear to auscultation bilaterally- no wheezes, rales or rhonchi, normal air movement, no respiratory distress  Cardiovascular: normal rate, normal S1 and S2, no gallops, intact distal pulses and no carotid bruits  Abdomen: soft, non-tender, non-distended, normal bowel sounds, no masses or organomegaly    Active Hospital Problems    Diagnosis Date Noted    Impaired mobility and activities of daily living due to NTSCI, severe lumbar canal stenosis s/p Rt and Rui L3-4, L4-5 microdissection and decompression, [facility] admit 6/23/16 [Z74.09, Z78.9]      Priority: High        I reviewed and agree with the findings and plan documented in her note . ASSESSMENT:        Acute on chronic decompensated combined congestive heart failure. Questionable right ventricular failure. NICMP EF of 40%. Likely pulmonary hypertension  Paroxysmal atrial fibrillation- normal sinus rhythm now. History of coronary disease- Mod LAD disease, negative IFR. History of SVT status post ablation  History of ascending and descending thoracic aortic aneurysm-stable  Essential hypertension  Left pleural effusion status post thoracentesis.          PLAN:   1. As always, aggressive risk factor modification is strongly recommended. We should adhere to the JNC VIII guidelines for HTN management and the NCEPATP III guidelines for LDL-C management. 2. Continue with Lasix p.o., monitor I's and O's and renal function  3. Max cardiac meds-cont with current cardiac meds. 4. Full dose anticoagulation, Xarelto  5. Check EKG periodically. 6. Pulmonary recommendations- follow up CT of chest in near future. 7. Further recommendations to follow.        Electronically signed by DO Deepthi on 2/3/21 at 12:16 PM EST

## 2021-02-03 NOTE — PROGRESS NOTES
[facility] Occupational Therapy      Date: 2/3/2021  Patient Name: Santo Donald        MRN: [id number]  Account: [de-identified]   : 1932  (80 y.o.)  Room: [place] [address]    Chart reviewed, attempted OT at 21  for OT eval. Patient not seen 2° to: Other: initiated OT eval. Upon arrival pt stated dizziness, headache and stomach ache. Pt SPO2 86% on 1.5L  NC and BP 84/56. ADDIE Marcial notified. RN instructed this OT increase pt's oxygen to 3L. SPO2 rechecked 92-93% on 3L O2 NC. RN present to recheck pt's BP 93/58. Differed OT eval at this time due to low BP as pt is symptomatic. Pt rescheduled for this aftternoon. Spoke to [employer], RN aware. Will attempt again when able.     Electronically signed by Cole Cotton OT on 2/3/2021 at 9:43 AM

## 2021-02-03 NOTE — PROGRESS NOTES
Physical Therapy  Facility/Department: [facility] N366/O759-59  Physical Therapy Discharge      NAME: Jomar Sewell    : 1932 (80 y.o.)  MRN: [id number]    Account: [de-identified]  Gender: female      Patient has been discharged from acute care hospital. DC patient from current PT program.      Electronically signed by Oleg Main PT on 2/3/21 at 3:20 PM EST

## 2021-02-03 NOTE — PROGRESS NOTES
Physical Therapy Missed Treatment   Facility/Department: [facility] MED SURG Z333/T824-55    NAME: Donnamaria Kanner  Patient Status:   : 1932 (80 y.o.)  MRN: [id number]  Account: [de-identified]  Gender: female        [] Patient Declines PT Treatment            [x] Patient Unavailable:     Daughter present when I arrived to see pt for pm PT tx. Pt presented with restless motions in bed and was trying to get comfortable. Daughter translated for me and said pt wants to be washed up sometime today before she leaves room. Reported that OT is scheduled at 1430 for therapy session. Pt very pleasant and told daughter she wanted to rest. Pt given ativan to relax her and it was starting to work while I was there. Daughter brought leg braces for pt to use when ambulating. Daughter leaving to go back to [facility] and states her brother will be here tomorrow. Will attempt PT Treatment again at earliest convenience.         Electronically signed by Lan Galeana PTA on 2/3/21 at 1:38 PM EST

## 2021-02-03 NOTE — PROGRESS NOTES
Subjective: The patient complains of ,\" moderate acute on chronic progressive fatigue and    partially relieved by rest,medications, Pt, OT, and rest and exacerbated by recent illness. I am concerned about patients medical complexities. Pleurel effusions tapped. ROS x10: The patient also complains of severely impaired mobility and activities of daily living. Otherwise no new problems with vision, hearing, nose, mouth, throat, dermal, cardiovascular, GI, , pulmonary, musculoskeletal, psychiatric or neurological. See Rehab H&P on Rehab chart dated . Vital signs:  BP (!) 93/58   Pulse 77     Vitals:    02/03/21 0904   BP: (!) 93/58   Pulse: 77       I/O:   PO/Intake:  fair PO intake, no problems observed or reported. Bowel/Bladder:  continent, no problems noted. General:  Patient is well developed, adequately nourished, non-obese and     well kempt. HEENT:    PERRLA, hearing intact to loud voice, external inspection of ear     and nose benign. Inspection of lips, tongue and gums benign  Musculoskeletal: No significant change in strength or tone. All joints stable. Inspection and palpation of digits and nails show no clubbing,       cyanosis or inflammatory conditions. Neuro/Psychiatric: Affect: flat but pleasant. Alert and oriented to person, place and     Situation with    cues. No significant change in deep tendon reflexes or     sensation  Lungs:  Diminished, CTA-B. Respiration effort is normal at rest.     Heart:   S1 = S2, RRR. No loud murmurs. Abdomen:  Soft, non-tender, no enlargement of liver or spleen. Extremities:  No significant lower extremity edema or tenderness. Skin:   Intact to general survey, no visualized or palpated problems. Rehabilitation:  Physical therapy:   Bed Mobility:      Transfers: Sit to Stand:  Moderate Assistance  Stand to sit: Moderate Assistance, Ambulation 1  Surface: level tile  Device: No Device  Assistance: Maximum assistance  Quality of Gait: sidesteps only due to reports of dizziness, assist for balance due to posterior LOB, weight shift assistance required  Distance: 2 steps,      FIMS:  ,  , Assessment: Pt reports dizziness and experieinced LOB in standing. Unable to continue with full therapy session. Pt is demonstrating deficits as listed. She is requiring assistance for mobility at this time.  Pt would benefit from continued PT prior to safe return to home    Occupational therapy:    ,  ,      Speech therapy:        Lab/X-ray studies reviewed, analyzed and discussed with patient and staff:   Recent Results (from the past 24 hour(s))   COVID-19    Collection Time: 02/02/21  4:02 PM   Result Value Ref Range    SARS-CoV-2, NAAT Not Detected Not Detected   Basic Metabolic Panel    Collection Time: 02/03/21  5:24 AM   Result Value Ref Range    Sodium 141 135 - 144 mEq/L    Potassium 4.1 3.4 - 4.9 mEq/L    Chloride 106 95 - 107 mEq/L    CO2 26 20 - 31 mEq/L    Anion Gap 9 9 - 15 mEq/L    Glucose 104 (H) 70 - 99 mg/dL    BUN 19 8 - 23 mg/dL    CREATININE 0.63 0.50 - 0.90 mg/dL    GFR Non-African American >60.0 >60    GFR  >60.0 >60    Calcium 8.6 8.5 - 9.9 mg/dL   CBC    Collection Time: 02/03/21  5:24 AM   Result Value Ref Range    WBC 8.6 4.8 - 10.8 K/uL    RBC 3.56 (L) 4.20 - 5.40 M/uL    Hemoglobin 10.3 (L) 12.0 - 16.0 g/dL    Hematocrit 30.9 (L) 37.0 - 47.0 %    MCV 86.9 82.0 - 100.0 fL    MCH 29.0 27.0 - 31.3 pg    MCHC 33.4 33.0 - 37.0 %    RDW 14.0 11.5 - 14.5 %    Platelets 222 168 - 505 K/uL       Echo Complete 2d W Doppler W Color    Result Date: 1/27/2021  Transthoracic Echocardiography Report (TTE)  Demographics  Patient Name   Sim Singh        Gender              Female                 Segundo Torres  Patient Number [id number]          Race                                                   Ethnicity  Visit Number   [id number]         Room Number         P851  Corporate ID                     Date of Study       01/27/2021  Accession      [id number]        Referring Physician  Number  Date of Birth  07/12/1932        Sonographer         Rene Flores TAE  Age            80 year(s)        Interpreting        [facility])                                   Physician           Cardiology                                                       Rebekah Cormier MD Procedure Type of Study  TTE procedure:ECHO COMPLETE 2D W/DOP W/COLOR. Procedure Date Date: 01/27/2021 Start: 10:26 AM Study Location: Portable Technical Quality: Adequate visualization Indications:Congestive heart failure. Patient Status: Routine Height: 64 inches Weight: 165 pounds BSA: 1.8 m^2 BMI: 28.32 kg/m^2 BP: 108/77 mmHg  Conclusions  Summary  Mitral annular calcification is present. 1+ MR  Left ventricular ejection fraction is visually estimated at 40%. E/A flow reversal noted. Suggestive of diastolic dysfunction. Signature  ----------------------------------------------------------------  Electronically signed by Rebekah Cormier MD(Interpreting  physician) on 01/27/2021 11:37 AM  ----------------------------------------------------------------  Findings Left Ventricle Left ventricular ejection fraction is visually estimated at 40%. E/A flow reversal noted. Suggestive of diastolic dysfunction. Right Ventricle Normal right ventricle structure and function. Normal right ventricle systolic pressure. Left Atrium Moderately dilated left atrium. Right Atrium Moderately enlarged right atrium size. Mitral Valve Mitral annular calcification is present. 1+ MR Tricuspid Valve Normal tricuspid valve structure and function. 1-2+ TR RVSP 38 mmHg Aortic Valve Aortic valve leaflets are moderately thickened. No AS No AR Pulmonic Valve The pulmonic valve was not well visualized . Pericardial Effusion No evidence of pericardial effusion. Pleural Effusion No evidence of pleural effusion. Aorta \ Miscellaneous The aorta is within normal limits.  M-Mode Measurements (cm)  LVIDd: 4.39 cm                         LVIDs: 3.55 cm  IVSd: 1.43 cm                          IVSs: 1.55 cm  LVPWd: 1.5 cm                          LVPWs: 1.48 cm  Rt. Vent.  Dimension: 3.74 cm           AO Root Dimension: 3.62 cm                                         ACS: 1.25 cm                                         LA: 4.54 cm                                         LVOT: 2.04 cm Doppler Measurements:  AV Velocity:0.02 m/s                    MV Peak E-Wave: 0.85 m/s  AV Peak Gradient: 10.44 mmHg            MV Peak A-Wave: 0.87 m/s  AV Mean Gradient: 6.01 mmHg  AV Area (Continuity):1.83 cm^2  TR Velocity:2.62 m/s                    Estimated RAP:10 mmHg  TR Gradient:27.54 mmHg                  RVSP:37.54 mmHg Valves  Mitral Valve  Peak E-Wave: 0.85 m/s                 Peak A-Wave: 0.87 m/s                                        E/A Ratio: 0.97                                        Peak Gradient: 2.88 mmHg                                        Deceleration Time: 232.8 msec  Tissue Doppler  E' Septal Velocity: 0.07 m/s  E' Lateral Velocity: 0.08 m/s  Aortic Valve  Peak Velocity: 1.62 m/s                Mean Velocity: 1.15 m/s  Peak Gradient: 10.44 mmHg              Mean Gradient: 6.01 mmHg  Area (continuity): 1.83 cm^2           Area (2D): 1.8 cm^2  AV VTI: 28.8 cm  Cusp Separation: 1.25 cm  Tricuspid Valve  Estimated RVSP: 37.54 mmHg              Estimated RAP: 10 mmHg  TR Velocity: 2.62 m/s                   TR Gradient: 27.54 mmHg  Pulmonic Valve  Peak Velocity: 0.96 m/s           Peak Gradient: 3.66 mmHg                                    Estimated PASP: 37.54 mmHg  LVOT  Peak Velocity: 0.89 m/s              Mean Velocity: 0.57 m/s  Peak Gradient: 3.16 mmHg             Mean Gradient: 1.58 mmHg  LVOT Diameter: 2.04 cm               LVOT VTI: 16.15 cm Structures  Left Atrium  LA Dimension: 4.54 cm                        LA Area: 11.81 cm^2  LA/Aorta: 1.25  LA Volume/Index: 44.64 ml /25 m^2  Left Ventricle  Diastolic Dimension: 4.39 cm         Systolic Dimension: 3.40 cm  Septum Diastolic: 2.79 cm            Septum Systolic: 8.91 cm  PW Diastolic: 1.5 cm                 PW Systolic: 1.10 cm                                       FS: 19.1 %  LV EDV/LV EDV Index: 87.32 ml/49 m^2 LV ESV/LV ESV Index: 52.68 ml/29 m^2  EF Calculated: 39.7 %                LV Length: 6.12 cm  LVOT Diameter: 2.04 cm  Right Atrium  RA Systolic Pressure: 10 mmHg  Right Ventricle  Diastolic Dimension: 8.13 cm                                    RV Systolic Pressure: 02.35 mmHg Aorta/ Miscellaneous Aorta  Aortic Root: 3.62 cm  LVOT Diameter: 2.04 cm    Cta Chest W Wo Contrast    Result Date: 1/26/2021  EXAMINATION:  CHEST CTA WITH CONTRAST (PULMONARY EMBOLISM PROTOCOL) CLINICAL HISTORY:   PE   I50.43 Acute on chronic combined systolic and diastolic CHF (congestive heart failure) ([facility]) ICD10. Technique:  Spiral axial CT acquisition of the chest from the thoracic inlet to the upper abdomen following IV contrast.  2-D images were reconstructed in the sagittal and coronal planes. 3-D MIPS images were generated in the coronal and axial planes. Images were reviewed on the PACS workstation. All images including the 3-D MIPS were personally archived. Contrast:  IV administration of 100 ml Isovue 300 All CT scans at this facility use dose modulation, iterative reconstruction, and/or weight based dosing when appropriate to reduce radiation dose to as low as reasonably achievable. Comparison:  CTA chest 3/15/2019  RESULT: Limitations: Motion artifact. Evaluation for thromboembolic disease:      - Right heart chambers:  No thromboembolic disease.      - Main pulmonary arteries:  No thromboembolic disease.      - Lobar pulmonary arteries:  No thromboembolic disease.        - Segmental pulmonary arteries:  No thromboembolic disease.        - Subsegmental pulmonary arteries:  Not well visualized due to motion. Lines, tubes, and devices:  None.  Lung parenchyma and pleura: Tortuous course of the trachea. Central airways appear grossly patent. Moderate left and small right pleural effusions with associated atelectasis. Limitations in evaluation of the lung parenchyma secondary to motion. Other areas of probable scarring/atelectasis. No pneumothorax. Thoracic inlet, heart, and mediastinum: Visualized thyroid unremarkable. No axillary, mediastinal, or hilar lymphadenopathy. Ascending thoracic aorta aneurysmal, measuring around 5.0 cm, similar to prior. Descending thoracic aorta aneurysmal and measures  up to 4.8 cm, also similar to prior. Normal pulmonary artery size. Cardiomegaly, similar to prior Scattered coronary artery calcifications. Small pericardial effusion. Small hiatal hernia. Bones and soft tissues:  No destructive bone lesion or acute osseous findings. Osteopenia. Scoliosis and kyphosis and multilevel degenerative changes, grossly similar to prior. Chest wall unremarkable. Upper abdomen:  No acute abnormality in the imaged upper abdomen. Reflux of contrast into the hepatic veins, associated with right heart failure. Lower neck: Unremarkable. No CT evidence of pulmonary embolism. Moderate left and small right pleural effusions. Cardiomegaly and small pericardial effusion. Reflux of contrast into the hepatic veins, associated with right heart failure. 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 No lung consolidative opacity. Areas of probable atelectasis/scarring. Xr Chest Portable    Result Date: 1/26/2021  EXAMINATION: Portable AP ERECT view of the chest. CLINICAL HISTORY:  SOB , Negative Covid-19 test. DATE: 1/25/2021 5:41 PM COMPARISONS: 7/24/2017 FINDINGS: The heart is moderately enlarged, similar to prior exam.  Prominent interstitial markings, consistent with increasing Central vascular congestion.  The costophrenic angles are blunted secondary to pleural effusions bilaterally, left greater than  right. No pneumothorax. There are infiltrates/atelectasis within lung bases, left greater than right. There are moderate degenerative changes in spine. CARDIAC ENLARGEMENT WITH CENTRAL VESSEL CONGESTION AND BILATERAL PLEURAL EFFUSIONS, POSSIBLE CONGESTIVE HEART FAILURE. BIBASILAR INFILTRATES/ATELECTASIS, LEFT GREATER THAN RIGHT. Previous extensive, complex labs, notes and diagnostics reviewed and analyzed. ALLERGIES:    Allergies as of 02/02/2021 - Review Complete 02/02/2021   Allergen Reaction Noted    Latex  07/19/2017    Tape [adhesive tape]  06/28/2016      (please also verify by checking [facility])     Complex Physical Medicine & Rehab Issues Assess & Plan:   1. Severe abnormality of gait and mobility and impaired self-care and ADL's secondary to progressive Cardiac rehab CHF exacerbation . Functional and medical status reassessed regarding patients ability to participate in therapies and patient found to be able to participate in acute intensive comprehensive inpatient rehabilitation program including PT/OT to improve balance, ambulation, ADLs, and to improve the P/AROM. Therapeutic modifications regarding activities in therapies, place, amount of time per day and intensity of therapy made daily. In bed therapies or bedside therapies prn.   2. Bowel and Bladder dysfunction  :  frequent toileting, ambulate to bathroom with assistance, check post void residuals. Check for C.difficile x1 if >2 loose stools in 24 hours, continue bowel & bladder program.  Monitor bowel and bladder function. Lactinex 2 PO every AC. MOM prn, Brown Bomb prn, Glycerin suppository prn, enema prn. 3. Moderate   pain as well as generalized OA pain: reassess pain every shift and prior to and after each therapy session, give prn Tylenol and   , modalities prn in therapy, masage, Lidoderm, K-pad prn. Consider scheduled AM pain meds.   4. Skin healing and breakdown risk:  continue pressure [address] [place] [address]

## 2021-02-03 NOTE — CONSULTS
Pulmonary and Critical Care Medicine  Consult Note  Encounter Date: 2/3/2021 3:46 PM    Ms. Macey Smith is a 80 y.o. female  : 1932  Requesting Provider: Alirio Velarde*    Reason for request: Pleural effusion            HISTORY OF PRESENT ILLNESS:    Patient is 80 y.o. presents with shortness of breath, she was found to have loculated left-sided pleural effusion and small right-sided effusion, thoracentesis was done on  fluid showed exudative pleural effusion with lymphocyte predominance, cytology negative for malignancy. Patient currently  recovering on the rehab floor, she currently feels good no chest pain, no coughing, no shortness of breath, however she does get short of breath after eating, and mild dyspnea on exertion, she is currently on 3 L O2 though she was not on oxygen at home, she does desaturate with exertion if off oxygen, no fever, blood pressure is okay. Past Medical History:        Diagnosis Date    Ascending aortic aneurysm ([address] 2017    Charcot Arleen Tooth muscular atrophy     Depression     Descending aortic aneurysm ([address] 2017    Essential hypertension 2018    Headache     HTN (hypertension)     Impaired mobility and activities of daily living     Lumbar stenosis with neurogenic claudication     Myelopathy ([address]     Osteoarthritis        Past Surgical History:        Procedure Laterality Date    JOINT REPLACEMENT      THORACENTESIS Left 2021    total of 755 cc removed per Dr Selene Mandel specimen sent to lab       Social History:     reports that she has never smoked. She has never used smokeless tobacco. She reports that she does not drink alcohol or use drugs.     Family History:       Problem Relation Age of Onset    Arthritis Mother     Arthritis Father     High Blood Pressure Father        Allergies:  Latex and Tape [adhesive tape]        MEDICATIONS during current hospitalization:    Continuous Infusions:    Scheduled Meds:   sodium chloride flush  10 mL Intravenous 2 times per day    aspirin  81 mg Oral Daily    carvedilol  3.125 mg Oral BID    citalopram  20 mg Oral Daily    dilTIAZem  120 mg Oral Daily    furosemide  20 mg Oral Daily    levothyroxine  88 mcg Oral Daily    [Held by provider] losartan  25 mg Oral Daily    potassium chloride  20 mEq Oral Daily with breakfast    rivaroxaban  20 mg Oral Daily    pantoprazole  40 mg Oral QAM AC       PRN Meds:acetaminophen **OR** [DISCONTINUED] acetaminophen, polyethylene glycol, promethazine **OR** ondansetron, sodium chloride flush, LORazepam, sodium chloride flush, simethicone        REVIEW OF SYSTEMS:  ROS: 10 organs review of system is done including general, psychological, ENT, hematological, endocrine, respiratory, cardiovascular, gastrointestinal, musculoskeletal, neurological,  allergy and Immunology is done and is otherwise negative. PHYSICAL EXAM:    Vitals:  BP 96/66   Pulse 63   Temp 99 °F (37.2 °C) (Oral)   SpO2 90%     General: alert, cooperative, no distress  Head: normocephalic, atraumatic  Eyes:No gross abnormalities. ENT:  MMM no lesions  Neck:  supple and no masses  Chest : clear to auscultation bilaterally- no wheezes, rales or rhonchi, normal air movement, no respiratory distress  Heart[de-identified] Heart sounds are normal.  Regular rate and rhythm without murmur, gallop or rub. ABD:  symmetric, soft, non-tender  Musculoskeletal : no cyanosis, no clubbing and no edema  Neuro:  Grossly normal  Skin: No rashes or nodules noted.   Lymph node:  no cervical nodes  Urology: yes Zeng   Psychiatric: appropriate        Data Review  Recent Labs     02/01/21  0511 02/02/21  0534 02/03/21  0524   WBC 8.0 8.1 8.6   HGB 11.0* 10.7* 10.3*   HCT 34.5* 33.3* 30.9*    263 275      Recent Labs     02/01/21  0511 02/02/21  0534 02/03/21  0524    144 141   K 4.0 3.7 4.1    109* 106   CO2 27 24 26   BUN 20 16 19   CREATININE 0.66 0. 60 0.63   GLUCOSE 97 102* 104*       MV Settings: ABGs: No results for input(s): PHART, KSH7TPZ, PO2ART, ZKH4BCV, BEART, [id number], DPX0HDP in the last 72 hours.   O2 Device: Nasal cannula  No results found for: [address]    Radiology  I personally reviewed imaging studies and chest x-ray January 29 shows small left-sided effusion        Assessment, plan:   Patient is at risk due to    · Exudative lymphocyte predominant pleural effusion, malignancy is a concern cytology is negative x1  · Chronic hypoxia and shortness of breath, could be related to underlying paroxysmal A. fib, diastolic dysfunction/pleural effusion    Recommendation  · Repeat chest x-ray  · If pleural effusion present then will repeat thoracentesis and repeat cytology, if negative then will need VATS and pleural biopsy  · Will need outpatient work-up for obstructive sleep apnea  · Will obtain PFT  · O2 to keep sat 90 to 92%  · Incentive spirometry and flutter valve       Thank you for consultation    Electronically signed by Shabbir San MD, [facility],  on 2/3/2021 at 3:46 PM

## 2021-02-03 NOTE — CARE COORDINATION
Call placed to son Georgie Goss to inquire about the braces to be brought in, stated that he would bring them at visitation.  Electronically signed by Estella Ochoa RN on 2/3/2021 at 11:57 AM

## 2021-02-03 NOTE — PROGRESS NOTES
Occupational Therapy   Occupational Therapy Initial Assessment  Date: 2/3/2021   Patient Name: Vinay Marie  MRN: [id number]     : 1932    Date of Service: 2/3/2021    Discharge Recommendations:  Continue to assess pending progress  OT Equipment Recommendations  Equipment Needed: Yes  Mobility Devices: ADL Assistive Devices  ADL Assistive Devices: Reacher;Sock-Aid Hard;Long-handled Sponge;Long-handled Shoe Horn    Assessment   Performance deficits / Impairments: Decreased functional mobility ; Decreased balance;Decreased ADL status; Decreased high-level IADLs;Decreased endurance;Decreased strength;Decreased posture;Decreased safe awareness  Assessment: Pt is an 81 y/o female from home alone who presents to [facility] with the above deficits which impact her independence and safety during ADLs. Pt would benefit from OT services to maximize independence and safety during ADLs. Prognosis: Good  History: 10 complexities  Exam: 8 perf deficits  Assistance / Modification: Max A  REQUIRES OT FOLLOW UP: Yes  Activity Tolerance  Activity Tolerance: Patient Tolerated treatment well  Safety Devices  Safety Devices in place: Yes  Type of devices: All fall risk precautions in place  Restraints  Initially in place: No           Patient Diagnosis(es): There were no encounter diagnoses. has a past medical history of Ascending aortic aneurysm ([address] Charcot Arleen Tooth muscular atrophy, Depression, Descending aortic aneurysm ([address] Essential hypertension, Headache, HTN (hypertension), Impaired mobility and activities of daily living, Lumbar stenosis with neurogenic claudication, Myelopathy ([address] and Osteoarthritis. has a past surgical history that includes joint replacement and thoracentesis (Left, 2021).            Restrictions  Restrictions/Precautions  Restrictions/Precautions: Fall Risk    Subjective   General  Chart Reviewed: Yes  Patient assessed for rehabilitation services?: Yes  Family / Caregiver Present: Transfer To: Shower seat with back  Shower - Technique: Ambulating  Shower Transfers: Contact Guard     ADL  Feeding: Setup  Grooming: Supervision  UE Bathing: Supervision  LE Bathing: Minimal assistance  UE Dressing: Unable to assess(comment)(gown)  LE Dressing: Verbal cueing;Maximum assistance  Toileting: Unable to assess(comment)(pt did not need to go)     Tone RUE  RUE Tone: Normotonic  Tone LUE  LUE Tone: Normotonic  Coordination  Movements Are Fluid And Coordinated: Yes     Bed mobility  Sit to Supine: Stand by assistance  Comment: Pt requires increased time to complete sit to supine transfer     Transfers  Sit to stand: Contact guard assistance  Stand to sit: Contact guard assistance     Vision - Basic Assessment  Prior Vision: No visual deficits  Visual History: No significant visual history  Patient Visual Report: No visual complaint reported. Visual Field Cut: No  Oculo Motor Control: WNL     Cognition  Overall Cognitive Status: Exceptions  Arousal/Alertness: Appropriate responses to stimuli  Following Commands: Follows one step commands consistently(visual and tactile cues provided secondary to language barrier)  Attention Span: Appears intact  Memory: (unable to assess secondary to language barrier)  Safety Judgement: Decreased awareness of need for safety  Problem Solving: Assistance required to generate solutions;Assistance required to implement solutions;Assistance required to identify errors made;Assistance required to correct errors made  Insights: Fully aware of deficits  Initiation: Does not require cues  Sequencing: Requires cues for some  Cognition Comment: Comp: Supervision, Express:  Independent, Social: Independent, Prob: Min A, Mem: N/A     Perception  Overall Perceptual Status: WFL     Sensation  Overall Sensation Status: WFL        LUE AROM (degrees)  LUE AROM : WFL  Left Hand AROM (degrees)  Left Hand AROM: WFL  RUE AROM (degrees)  RUE AROM : WFL  Right Hand AROM (degrees)  Right Hand AROM: WFL  LUE Strength  Gross LUE Strength: Exceptions to [facility]  L Hand General: 3+/5  LUE Strength Comment: 3/5 all planes  RUE Strength  Gross RUE Strength: Exceptions to [facility]  R Hand General: 3+/5  RUE Strength Comment: 3/5 all planes     Hand Dominance  Hand Dominance: Right             Plan   Plan  Times per week: 5-7x  Plan weeks: 5-7 days  Current Treatment Recommendations: Strengthening, Endurance Training, Neuromuscular Re-education, Self-Care / ADL, Balance Training, Functional Mobility Training, Cognitive Reorientation, Home Management Training, Safety Education & Training, Equipment Evaluation, Education, & procurement, Patient/Caregiver Education & Training    G-Code     OutComes Score                                                  AM-PAC Score             Goals  Patient Goals   Patient goals : Not stated at this time      [x]  Patient will complete self care as followed using the recommended adaptive equipment and/or adaptive techniques as instructed:  Feeding:Independent  Grooming: Independent  Bathing: Mod I  UE Dressing: Independent  LE Dressing: Mod I  Toileting: Independent  Toilet Transfers: Independent  Tub Transfers: Independent     [x]  Patient will sequence self-care routine with no verbal/tactile cues. []  Patient will improve UE sensation and/or utilize compensatory techniques for safe completion of self-care as projected. [x]  Patient will improve static and dynamic standing balance to complete pants management at Mod I level     []  Patient will improve UE Function (AROM, strength, motor control, tone normalization) to complete ADLs as projected. [x]  Patient will improve functional endurance to tolerate/complete 60 minutes of ADLs. [x]  Patient will improve B UE strength and endurance to [facility] in order to participate in self-care activities as projected.      []  Patient will improve hand fine motor coordination to in order to manage clothing fasteners/self-care containers in a timely manner     []  Patient will improve visual perception to  in order to improve participation in self care and leisure activities     [x]  Patient will perform kitchen mobility at device level without episodes of LOB and good safety awareness      [x]  Patient will perform basic room mobility at Independent level. [x]  Patient will access appropriate D/C site with as few architectural barriers as possible. []  Patient and/or caregiver will demonstrate understanding of hip precautions with verbal/tactile cues. [x]  Patient and/or caregiver will demonstrate understanding of energy conservation techniques with no verbal/tactile cues. [x]  Patient and/or caregiver will demonstrate understanding of recommended HEP for UE strengthening. [x]  Patient's cognition will improve to safely perform ADLs:  Comprehension: Independent  Expression: Independent  Social Interaction: Independent  Problem Solving:  Mod I  Memory: N/A          Therapy Time   Individual Concurrent Group Co-treatment   Time In 1430         Time Out 1530         Minutes 60           Eval: 60 minutes     Brenton Anders OT   Electronically signed by Brenton Anders OT on 2/3/2021 at 4:58 PM

## 2021-02-03 NOTE — PROGRESS NOTES
Facility/Department: [facility] Initial Assessment: Physical Therapy  Room: R249R249-01    NAME: Jimi Rojas  : 1932  MRN: [id number]    Date of Service: 2/3/2021    Rehab Diagnosis(es):impaired mobility and ADL's d/t CHF exacerbation  Patient Active Problem List    Diagnosis Date Noted    Impaired mobility and activities of daily living due to [employer], severe lumbar canal stenosis s/p Rt and Rui L3-4, L4-5 microdissection and decompression, [facility] admit 16      Priority: High    Lumbar stenosis with neurogenic claudication 2016     Priority: Medium    Gait abnormality 2021    Acute on chronic combined systolic and diastolic CHF (congestive heart failure) ([address] 2021    Essential hypertension 2018    Shortness of breath     Dizziness     Ascending aortic aneurysm ([address] 2017    Descending aortic aneurysm ([address] 2017    Osteoarthritis     Myelopathy ([address]     Headache     Depression     Acquired scoliosis 2016    Osteoporosis 2016    Charcot Arleen Tooth muscular atrophy 2016    Excess weight 2016    Osteoarthritis of lumbar spine with myelopathy 2016       Past Medical History:   Diagnosis Date    Ascending aortic aneurysm ([address] 2017    Charcot Arleen Tooth muscular atrophy     Depression     Descending aortic aneurysm ([address] 2017    Essential hypertension 2018    Headache     HTN (hypertension)     Impaired mobility and activities of daily living     Lumbar stenosis with neurogenic claudication     Myelopathy ([address]     Osteoarthritis      Past Surgical History:   Procedure Laterality Date    JOINT REPLACEMENT      THORACENTESIS Left 2021    total of 755 cc removed per Dr Chin Cortez specimen sent to lab       Chart Reviewed: Yes  Family / Caregiver Present: No  Diagnosis: impaired mobility and ADL's d/t CHF exacerbation    Restrictions:  Restrictions/Precautions: Fall Risk       SUBJECTIVE:      Pre Treatment Pain Screening  Pain at present: 0    Post Treatment Pain Screening:  Pain Assessment  Pain Level: 0    Prior Level of Function:  Social/Functional History  Lives With: Alone  Type of Home: House  Home Layout: One level  Home Access: Stairs to enter with rails  Entrance Stairs - Number of Steps: 2  Entrance Stairs - Rails: Both  Bathroom Shower/Tub: Tub/Shower unit  Bathroom Equipment: Grab bars in shower, Shower chair  Home Equipment: Rolling walker, Cane(Pt infrequemtly uses DME for ambulation and prefers to furniture walk in home)  ADL Assistance: [address]: Independent  Homemaking Responsibilities: Yes  Ambulation Assistance: Independent  Transfer Assistance: Independent  Additional Comments: Son stops over 2 times daily    OBJECTIVE:   Vision/Hearing:  Vision: Within Functional Limits  Hearing: Exceptions to [facility]  Hearing Exceptions: Hard of hearing/hearing concerns; No hearing aid    Cognition:  Overall Orientation Status: (remainder NT)  Orientation Level: Oriented to person  Follows Commands: Impaired(pt speaks primarily [place] - no  service for [facility] available. Pt required repetition and demonstration to complete tasks included below)       ROM:  RLE PROM: WFL  LLE PROM: WFL    Strength:  Strength RLE  Comment: 4-/5 except 2-/5 df  Strength LLE  Comment: 4-/5 except 2-/5 df    Neuro:        Balance  Sitting - Static: Fair  Sitting - Dynamic: Fair;-(unable to reach due to requiring BUE support to maintain sitting)  Standing - Static: Poor  Standing - Dynamic: Poor  Comments: Posterior LOB in standing due to dizziness       Bed mobility  Rolling to Left: Stand by assistance  Rolling to Right: Stand by assistance  Supine to Sit: Moderate assistance  Sit to Supine:  Moderate assistance  Comment: HOB flat - rail utilized - increased assistance required this date compared to previous days on medical floor    Transfers  Sit to Stand: Moderate Assistance  Stand to sit: Moderate Assistance  Comment: lifting assistance required    Ambulation  Ambulation?: Yes  Ambulation 1  Surface: level tile  Device: No Device  Assistance: Maximum assistance  Quality of Gait: sidesteps only due to reports of dizziness, assist for balance due to posterior LOB, weight shift assistance required  Distance: 2 steps              Activity Tolerance  Activity Tolerance: Patient limited by endurance;Treatment limited secondary to medical complications (free text)  Activity Tolerance: Pt with dizziness upon sitting and standing. Dissipated in sitting with rest and LE movement tasks. LOB noted in standing as result - pt unable to take greater than 2 steps. Attempted to take P however pt unable to maintain sitting or standing adequately to obtain information. Nursing notified of pt difficulties. She states pt has had BP issues earlier in day.  Remainder of this session cancelled          Quality Indicators (IRF-CHAI):  Rolling L and R: Supervision or Touching Assistance - 4  Sit>Supine: Partial/Moderate Assistance (helper does <50%) - 3  Supine>Sit: Partial/Moderate Assistance (helper does <50%) - 3  Sit>Stand: Partial/Moderate Assistance (helper does <50%) - 3  Chair/Bed>Chair Transfer: Partial/Moderate Assistance (helper does <50%) - 3  Car Transfers: Not attempted due to Medical Condition or Safety Concerns (I.e. unsafe or physician orders) - 80  Walk 10 ft: Not attempted due to Medical Condition or Safety Concerns (I.e. unsafe or physician orders) - 88  Walk 50 ft with two 90 degree turns: Not attempted due to Medical Condition or Safety Concerns (I.e. unsafe or physician orders) - 80  Walk 150 ft in [address]: Not attempted due to Medical Condition or Safety Concerns (I.e. unsafe or physician orders) - 88  Walking 10 ft on Unlevel Surface: Not attempted due to Medical Condition or Safety Concerns (I.e. unsafe or physician orders) - 80  Picking up Objects from Standing Position: Not attempted due to Medical Condition or Safety Concerns (I.e. unsafe or physician orders) - 80  Stairs: No Not attempted due to medical condition or safety concerns (i.e. unsafe or physician order) - 88  WC Mobility: No Not Applicable (pt did not complete item prior to admission) - 9      ASSESSMENT:  Body structures, Functions, Activity limitations: Decreased functional mobility ; Decreased safe awareness;Decreased balance;Decreased endurance;Decreased strength;Decreased posture  Decision Making: Medium Complexity  History: high  Exam: med  Clinical Presentation: med    PT Education: Goals;PT Role;Plan of Care    CLINICAL IMPRESSION: Pt reports dizziness and experieinced LOB in standing. Unable to continue with full therapy session. Pt is demonstrating deficits as listed. She is requiring assistance for mobility at this time. Pt would benefit from continued PT prior to safe return to home    PLAN OF CARE:  Frequency: 1-2 treatment sessions per day, 5-7 days per week     Current Treatment Recommendations: Strengthening, Transfer Training, Endurance Training, Neuromuscular Re-education, Equipment Evaluation, Education, & procurement, Balance Training, Gait Training, Functional Mobility Training, Safety Education & Training, Stair training, Home Exercise Program    Patient's Goal:   Pt unable to state    GOALS:  Long term goals  Long term goal 1: indep bed mobility  Long term goal 2: indep bed transfers  Long term goal 3: indep gait with ww or without device 50 feet -  feet  Long term goal 4: SBA 4 stairs  Long term goal 5: 20/56 for Bernard balance    ELOS:   Plan weeks: 1- 1 1/2    Therapy Time:    Individual   Time In 1100   Time Out 1130   Minutes 30     Pt later able to ambulate to and from the bathroom with ww. Mod assistance for transfer and bed mobility required. Min assist for gait with ww 30 feet x2 with O2 in place required - mildly unsteady with lat LOB and assist for recovery required. Pt require BUE support for washing hands in standing     Pt reported mild dizziness intermittently during this portion of care. Pt did report dizziness upon return to bed. Nursing aware      Therapy Time:    Individual   Time In 1140   Time Out 1150   Minutes 10    Pt unable to tolerate full therapy session due to BP issues, dizziness and fatigue.  Nursing aware     Eval - 30 min  Transfer training - 10 min    Alon Trammell, PT, 02/03/21 at 11:57 AM

## 2021-02-04 LAB
ANION GAP SERPL CALCULATED.3IONS-SCNC: 9 MEQ/L (ref 9–15)
BUN BLDV-MCNC: 22 MG/DL (ref 8–23)
CALCIUM SERPL-MCNC: 8.7 MG/DL (ref 8.5–9.9)
CHLORIDE BLD-SCNC: 108 MEQ/L (ref 95–107)
CO2: 27 MEQ/L (ref 20–31)
CREAT SERPL-MCNC: 0.81 MG/DL (ref 0.5–0.9)
GFR AFRICAN AMERICAN: >60
GFR NON-AFRICAN AMERICAN: >60
GLUCOSE BLD-MCNC: 99 MG/DL (ref 70–99)
HCT VFR BLD CALC: 31 % (ref 37–47)
HEMOGLOBIN: 10.2 G/DL (ref 12–16)
MCH RBC QN AUTO: 29.3 PG (ref 27–31.3)
MCHC RBC AUTO-ENTMCNC: 33 % (ref 33–37)
MCV RBC AUTO: 88.7 FL (ref 82–100)
PDW BLD-RTO: 14.2 % (ref 11.5–14.5)
PLATELET # BLD: 260 K/UL (ref 130–400)
POTASSIUM SERPL-SCNC: 4 MEQ/L (ref 3.4–4.9)
RBC # BLD: 3.5 M/UL (ref 4.2–5.4)
SODIUM BLD-SCNC: 144 MEQ/L (ref 135–144)
WBC # BLD: 8.6 K/UL (ref 4.8–10.8)

## 2021-02-04 PROCEDURE — 97116 GAIT TRAINING THERAPY: CPT

## 2021-02-04 PROCEDURE — 97112 NEUROMUSCULAR REEDUCATION: CPT

## 2021-02-04 PROCEDURE — 97110 THERAPEUTIC EXERCISES: CPT

## 2021-02-04 PROCEDURE — 6370000000 HC RX 637 (ALT 250 FOR IP): Performed by: PHYSICAL MEDICINE & REHABILITATION

## 2021-02-04 PROCEDURE — 85027 COMPLETE CBC AUTOMATED: CPT

## 2021-02-04 PROCEDURE — 97530 THERAPEUTIC ACTIVITIES: CPT

## 2021-02-04 PROCEDURE — 6360000002 HC RX W HCPCS: Performed by: INTERNAL MEDICINE

## 2021-02-04 PROCEDURE — 80048 BASIC METABOLIC PNL TOTAL CA: CPT

## 2021-02-04 PROCEDURE — 97535 SELF CARE MNGMENT TRAINING: CPT

## 2021-02-04 PROCEDURE — 1180000000 HC REHAB R&B

## 2021-02-04 PROCEDURE — 99232 SBSQ HOSP IP/OBS MODERATE 35: CPT | Performed by: INTERNAL MEDICINE

## 2021-02-04 PROCEDURE — 36415 COLL VENOUS BLD VENIPUNCTURE: CPT

## 2021-02-04 PROCEDURE — 2700000000 HC OXYGEN THERAPY PER DAY

## 2021-02-04 PROCEDURE — 6370000000 HC RX 637 (ALT 250 FOR IP): Performed by: INTERNAL MEDICINE

## 2021-02-04 RX ORDER — FUROSEMIDE 10 MG/ML
20 INJECTION INTRAMUSCULAR; INTRAVENOUS ONCE
Status: COMPLETED | OUTPATIENT
Start: 2021-02-04 | End: 2021-02-04

## 2021-02-04 RX ADMIN — FUROSEMIDE 20 MG: 10 INJECTION, SOLUTION INTRAVENOUS at 15:13

## 2021-02-04 RX ADMIN — LEVOTHYROXINE SODIUM 88 MCG: 88 TABLET ORAL at 06:35

## 2021-02-04 RX ADMIN — ASPIRIN 81 MG: 81 TABLET, CHEWABLE ORAL at 12:01

## 2021-02-04 RX ADMIN — POTASSIUM CHLORIDE 20 MEQ: 20 TABLET, EXTENDED RELEASE ORAL at 12:01

## 2021-02-04 RX ADMIN — RIVAROXABAN 20 MG: 20 TABLET, FILM COATED ORAL at 16:55

## 2021-02-04 RX ADMIN — PANTOPRAZOLE SODIUM 40 MG: 40 TABLET, DELAYED RELEASE ORAL at 06:35

## 2021-02-04 RX ADMIN — CITALOPRAM HYDROBROMIDE 20 MG: 20 TABLET ORAL at 12:01

## 2021-02-04 RX ADMIN — DILTIAZEM HYDROCHLORIDE 120 MG: 120 CAPSULE, COATED, EXTENDED RELEASE ORAL at 12:01

## 2021-02-04 ASSESSMENT — PAIN SCALES - GENERAL
PAINLEVEL_OUTOF10: 0
PAINLEVEL_OUTOF10: 0
PAINLEVEL_OUTOF10: 5
PAINLEVEL_OUTOF10: 0

## 2021-02-04 NOTE — PROGRESS NOTES
Occupational Therapy  Facility/Department: [facility]  Daily Treatment Note  NAME: Rosita Huff  : 1932  MRN: [id number]    Date of Service: 2021    Discharge Recommendations:  Continue to assess pending progress       Assessment      Activity Tolerance  Activity Tolerance: Patient Tolerated treatment well  Activity Tolerance: Patient had 2L of O2 on during the session. Patient needed occasional rest breaks due to fatigue during the functional tasks as well as table top tasks for endurance  Safety Devices  Safety Devices in place: Yes  Type of devices: All fall risk precautions in place         Patient Diagnosis(es): There were no encounter diagnoses. has a past medical history of Ascending aortic aneurysm ([address] Charcot Arleen Tooth muscular atrophy, Depression, Descending aortic aneurysm ([address] Essential hypertension, Headache, HTN (hypertension), Impaired mobility and activities of daily living, Lumbar stenosis with neurogenic claudication, Myelopathy ([address] and Osteoarthritis. has a past surgical history that includes joint replacement and thoracentesis (Left, 2021). Restrictions  Restrictions/Precautions  Restrictions/Precautions: Fall Risk  Subjective Kory Mola was notified that patient has a rash on her lower back.    General  Chart Reviewed: Yes  Patient assessed for rehabilitation services?: Yes  Family / Caregiver Present: No  Referring Practitioner: Dr. Janee Carmona  Diagnosis: Impaired mobility and ADLs d/t CHF exacerbation  Pain Assessment  Pain Level: 0  Pre Treatment Pain Screening  Pain at present: 0   Orientation     Objective    ADL  Grooming: Supervision(at seated level)  UE Bathing: (Patient declined to bathe stating that she bathed last night and she did not want to bathe again.)  UE Dressing: Supervision;Setup  LE Dressing: Maximum assistance - patient was encouraged to do more of the task but declined and insisted therapist thread her pants and don her socks and

## 2021-02-04 NOTE — PROGRESS NOTES
INPATIENT PROGRESS NOTES    PATIENT NAME: Jessi Golden  MRN: [id number]  SERVICE DATE:  February 4, 2021   SERVICE TIME:  12:51 PM      PRIMARY SERVICE: Pulmonary Disease    CHIEF COMPLAINTS: Pleural effusion    INTERVAL HPI: Patient seen and examined at bedside, Interval Notes, orders reviewed. Nursing notes noted    Patient report pain on the left side of her upper abdomen, happened when she was pulling some heavy object during rehab session today, no shortness of breath, no coughing, no chest pain, patient has been assisted in interview    Review of system:     GI Abdominal pain No  Skin Rash No    Social History     Tobacco Use    Smoking status: Never Smoker    Smokeless tobacco: Never Used   Substance Use Topics    Alcohol use: No     Alcohol/week: 0.0 standard drinks         Problem Relation Age of Onset    Arthritis Mother     Arthritis Father     High Blood Pressure Father          OBJECTIVE    There is no height or weight on file to calculate BMI. PHYSICAL EXAM:  Vitals:  /73   Pulse 56   Temp 97 °F (36.1 °C) (Oral)   Resp 16   SpO2 95%     General: alert, cooperative, no distress  Head: normocephalic, atraumatic  Eyes:No gross abnormalities. ENT:  MMM no lesions  Neck:  supple and no masses  Chest : Few rales at the bases, otherwise clear no wheezing, nontender, tympanic, good air movement  Heart[de-identified] Heart sounds are normal.  Regular rate and rhythm without murmur, gallop or rub. ABD:  symmetric, soft, slightly tender left upper quadrant similar to what noted in HPI, no guarding or rebound  Musculoskeletal : no cyanosis, no clubbing and no edema  Neuro:  Grossly normal  Skin: No rashes or nodules noted.   Lymph node:  no cervical nodes  Urology: Yes Zeng   Psychiatric: appropriate    DATA:   Recent Labs     02/03/21 0524 02/04/21 0519   WBC 8.6 8.6   HGB 10.3* 10.2*   HCT 30.9* 31.0*   MCV 86.9 88.7    260     Recent Labs     02/03/21 0524 02/04/21 0519    144   K 4.1 4.0    108*   CO2 26 27   BUN 19 22   CREATININE 0.63 0.81   GLUCOSE 104* 99   CALCIUM 8.6 8.7   LABGLOM >60.0 >60.0   GFRAA >60.0 >60.0       MV Settings:          No results for input(s): PHART, WTW3DWH, PO2ART, OAH4MOM, BEART, J0VQDEVR in the last 72 hours. O2 Device: Nasal cannula    DIET LOW SODIUM 2 GM;  Dietary Nutrition Supplements: Standard High Calorie Oral Supplement     MEDICATIONS during current hospitalization:    Continuous Infusions:    Scheduled Meds:   aspirin  81 mg Oral Daily    carvedilol  3.125 mg Oral BID    citalopram  20 mg Oral Daily    dilTIAZem  120 mg Oral Daily    furosemide  20 mg Oral Daily    levothyroxine  88 mcg Oral Daily    [Held by provider] losartan  25 mg Oral Daily    potassium chloride  20 mEq Oral Daily with breakfast    rivaroxaban  20 mg Oral Daily    pantoprazole  40 mg Oral QAM AC       PRN Meds:acetaminophen **OR** [DISCONTINUED] acetaminophen, polyethylene glycol, promethazine **OR** ondansetron, sodium chloride flush, LORazepam, sodium chloride flush, simethicone    Radiology  Echo Complete 2d W Doppler W Color    Result Date: 1/27/2021  Transthoracic Echocardiography Report (TTE)  Demographics  Patient Name   [facility]        Gender              Female                 Barrington Iglesias  Patient Number [id number]          Race                                                   Ethnicity  Visit Number   [id number]         Room Number         V045  Corporate ID                     Date of Study       01/27/2021  Accession      [id number]        Referring Physician  Number  Date of Birth  07/12/1932        Sonographer         Mely STRONG  Age            80 year(s)        Interpreting        [facility])                                   Physician           Cardiology                                                       Gary Garcia MD Procedure Type of Study  TTE procedure:ECHO COMPLETE 2D W/DOP W/COLOR.  Procedure Date Date: 01/27/2021 Start: 10:26 AM Study Location: Portable Technical Quality: Adequate visualization Indications:Congestive heart failure. Patient Status: Routine Height: 64 inches Weight: 165 pounds BSA: 1.8 m^2 BMI: 28.32 kg/m^2 BP: 108/77 mmHg  Conclusions  Summary  Mitral annular calcification is present. 1+ MR  Left ventricular ejection fraction is visually estimated at 40%. E/A flow reversal noted. Suggestive of diastolic dysfunction. Signature  ----------------------------------------------------------------  Electronically signed by Bonifacio Degroot MD(Interpreting  physician) on 01/27/2021 11:37 AM  ----------------------------------------------------------------  Findings Left Ventricle Left ventricular ejection fraction is visually estimated at 40%. E/A flow reversal noted. Suggestive of diastolic dysfunction. Right Ventricle Normal right ventricle structure and function. Normal right ventricle systolic pressure. Left Atrium Moderately dilated left atrium. Right Atrium Moderately enlarged right atrium size. Mitral Valve Mitral annular calcification is present. 1+ MR Tricuspid Valve Normal tricuspid valve structure and function. 1-2+ TR RVSP 38 mmHg Aortic Valve Aortic valve leaflets are moderately thickened. No AS No AR Pulmonic Valve The pulmonic valve was not well visualized . Pericardial Effusion No evidence of pericardial effusion. Pleural Effusion No evidence of pleural effusion. Aorta \ Miscellaneous The aorta is within normal limits. M-Mode Measurements (cm)  LVIDd: 4.39 cm                         LVIDs: 3.55 cm  IVSd: 1.43 cm                          IVSs: 1.55 cm  LVPWd: 1.5 cm                          LVPWs: 1.48 cm  Rt. Vent.  Dimension: 3.74 cm           AO Root Dimension: 3.62 cm                                         ACS: 1.25 cm                                         LA: 4.54 cm                                         LVOT: 2.04 cm Doppler Measurements:  AV Velocity:0.02 m/s                    MV Peak E-Wave: 0.85 m/s  AV Peak Gradient: 10.44 mmHg            MV Peak A-Wave: 0.87 m/s  AV Mean Gradient: 6.01 mmHg  AV Area (Continuity):1.83 cm^2  TR Velocity:2.62 m/s                    Estimated RAP:10 mmHg  TR Gradient:27.54 mmHg                  RVSP:37.54 mmHg Valves  Mitral Valve  Peak E-Wave: 0.85 m/s                 Peak A-Wave: 0.87 m/s                                        E/A Ratio: 0.97                                        Peak Gradient: 2.88 mmHg                                        Deceleration Time: 232.8 msec  Tissue Doppler  E' Septal Velocity: 0.07 m/s  E' Lateral Velocity: 0.08 m/s  Aortic Valve  Peak Velocity: 1.62 m/s                Mean Velocity: 1.15 m/s  Peak Gradient: 10.44 mmHg              Mean Gradient: 6.01 mmHg  Area (continuity): 1.83 cm^2           Area (2D): 1.8 cm^2  AV VTI: 28.8 cm  Cusp Separation: 1.25 cm  Tricuspid Valve  Estimated RVSP: 37.54 mmHg              Estimated RAP: 10 mmHg  TR Velocity: 2.62 m/s                   TR Gradient: 27.54 mmHg  Pulmonic Valve  Peak Velocity: 0.96 m/s           Peak Gradient: 3.66 mmHg                                    Estimated PASP: 37.54 mmHg  LVOT  Peak Velocity: 0.89 m/s              Mean Velocity: 0.57 m/s  Peak Gradient: 3.16 mmHg             Mean Gradient: 1.58 mmHg  LVOT Diameter: 2.04 cm               LVOT VTI: 16.15 cm Structures  Left Atrium  LA Dimension: 4.54 cm                        LA Area: 11.81 cm^2  LA/Aorta: 1.25  LA Volume/Index: 44.64 ml /25 m^2  Left Ventricle  Diastolic Dimension: 0.19 cm         Systolic Dimension: 0.17 cm  Septum Diastolic: 9.53 cm            Septum Systolic: 2.05 cm  PW Diastolic: 1.5 cm                 PW Systolic: 2.00 cm                                       FS: 19.1 %  LV EDV/LV EDV Index: 87.32 ml/49 m^2 LV ESV/LV ESV Index: 52.68 ml/29 m^2  EF Calculated: 39.7 %                LV Length: 6.12 cm  LVOT Diameter: 2.04 cm  Right Atrium  RA Systolic Pressure: 10 mmHg  Right Ventricle  Diastolic Dimension: 3.74 cm                                    RV Systolic Pressure: 33.91 mmHg Aorta/ Miscellaneous Aorta  Aortic Root: 3.62 cm  LVOT Diameter: 2.04 cm    Xr Chest (2 Vw)    1. No evidence of pneumothorax. Resolution of left pleural effusion. Cardiac silhouette remains enlarged. Left lower lobe hazy opacity may represent atelectasis versus pneumonia or infiltrate. COMPARISON: No prior studies available for comparison. DIAGNOSIS:  Post left thoracentesis. Dr. Daniela Gordon to read. COMMENTS: I50.43 Acute on chronic combined systolic and diastolic CHF (congestive heart failure) ([facility]) ICD10 TECHNIQUE: XR CHEST (2 VW) FINDINGS: Left lower lobe opacity may represent atelectasis versus pneumonia or infiltrate. Interval resolution of left pleural effusion. No evidence of pneumothorax. Cardiac silhouette is enlarged. Visualized soft tissues, and osseous structures are unremarkable. Cta Chest W Wo Contrast    Result Date: 1/26/2021  EXAMINATION:  CHEST CTA WITH CONTRAST (PULMONARY EMBOLISM PROTOCOL) CLINICAL HISTORY:   PE   I50.43 Acute on chronic combined systolic and diastolic CHF (congestive heart failure) ([address] ICD10. Technique:  Spiral axial CT acquisition of the chest from the thoracic inlet to the upper abdomen following IV contrast.  2-D images were reconstructed in the sagittal and coronal planes. 3-D MIPS images were generated in the coronal and axial planes. Images were reviewed on the PACS workstation. All images including the 3-D MIPS were personally archived. Contrast:  IV administration of 100 ml Isovue 300 All CT scans at this facility use dose modulation, iterative reconstruction, and/or weight based dosing when appropriate to reduce radiation dose to as low as reasonably achievable. Comparison:  CTA chest 3/15/2019  RESULT: Limitations: Motion artifact. Evaluation for thromboembolic disease:      - Right heart chambers:  No thromboembolic disease.      - Main pulmonary arteries:  No thromboembolic disease. - Lobar pulmonary arteries:  No thromboembolic disease.        - Segmental pulmonary arteries:  No thromboembolic disease.        - Subsegmental pulmonary arteries:  Not well visualized due to motion. Lines, tubes, and devices:  None. Lung parenchyma and pleura: Tortuous course of the trachea. Central airways appear grossly patent. Moderate left and small right pleural effusions with associated atelectasis. Limitations in evaluation of the lung parenchyma secondary to motion. Other areas of probable scarring/atelectasis. No pneumothorax. Thoracic inlet, heart, and mediastinum: Visualized thyroid unremarkable. No axillary, mediastinal, or hilar lymphadenopathy. Ascending thoracic aorta aneurysmal, measuring around 5.0 cm, similar to prior. Descending thoracic aorta aneurysmal and measures  up to 4.8 cm, also similar to prior. Normal pulmonary artery size. Cardiomegaly, similar to prior Scattered coronary artery calcifications. Small pericardial effusion. Small hiatal hernia. Bones and soft tissues:  No destructive bone lesion or acute osseous findings. Osteopenia. Scoliosis and kyphosis and multilevel degenerative changes, grossly similar to prior. Chest wall unremarkable. Upper abdomen:  No acute abnormality in the imaged upper abdomen. Reflux of contrast into the hepatic veins, associated with right heart failure. Lower neck: Unremarkable. No CT evidence of pulmonary embolism. Moderate left and small right pleural effusions. Cardiomegaly and small pericardial effusion. Reflux of contrast into the hepatic veins, associated with right heart failure. 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 No lung consolidative opacity. Areas of probable atelectasis/scarring.      Xr Chest Portable    Result Date: 2/4/2021  EXAMINATION: XR CHEST PORTABLE CLINICAL HISTORY: SHORTNESS OF BREATH, CHEST PAIN COMPARISONS: CTA CHEST, JANUARY 25, 2021. [address], JANUARY 29, 2021. FINDINGS: Osseous structures intact. Cardiopericardial silhouette enlarged and unchanged. Cephalization pulmonary vasculature. No focal airspace disease. Increased opacity left lower lung obscures left diaphragm. STABLE MARKED CARDIOMEGALY. PROBABLE INTERSTITIAL EDEMA. LEFT LOWER LUNG ATELECTASIS/PNEUMONIA. Xr Chest Portable    Result Date: 1/28/2021  EXAMINATION: CHEST PORTABLE VIEW  CLINICAL HISTORY: Short of breath COMPARISONS: None  FINDINGS: Single  views of the chest is submitted. The cardiac silhouette is enlarged. Pulmonary vascular unremarkable. Right sided trachea. Left lower lobe infiltrate/consolidation, collapse with left sided pleural effusion. LEFT LOWER LOBE INFILTRATE/CONSOLIDATION, COLLAPSE WITH LEFT SIDED PLEURAL EFFUSION    Xr Chest Portable    Result Date: 1/26/2021  EXAMINATION: Portable AP ERECT view of the chest. CLINICAL HISTORY:  SOB , Negative Covid-19 test. DATE: 1/25/2021 5:41 PM COMPARISONS: 7/24/2017 FINDINGS: The heart is moderately enlarged, similar to prior exam.  Prominent interstitial markings, consistent with increasing Central vascular congestion. The costophrenic angles are blunted secondary to pleural effusions bilaterally, left greater than  right. No pneumothorax. There are infiltrates/atelectasis within lung bases, left greater than right. There are moderate degenerative changes in spine. CARDIAC ENLARGEMENT WITH CENTRAL VESSEL CONGESTION AND BILATERAL PLEURAL EFFUSIONS, POSSIBLE CONGESTIVE HEART FAILURE. BIBASILAR INFILTRATES/ATELECTASIS, LEFT GREATER THAN RIGHT. Ir Guided Thoracentesis Pleural    Technically successful ultrasound-guided thoracentesis without immediate complications. HISTORY: Po Fraga is a Female of 80 years age.  DIAGNOSIS: Left pleural effusion COMMENTS: I50.43 Acute on chronic combined systolic and diastolic CHF (congestive heart failure) ([facility] [address] [address] ICD10 PROCEDURE: Following the discussion of procedure, risks versus benefits, possible complications, informed consent was obtained. Following universal protocol, patient and site verification was performed with a \"timeout\" prior to the procedure. Brief survey of the patient's left hemithorax demonstrate moderate amount of pleural effusion. After selection of an appropriate site for thoracentesis, the thoracic skin was prepped and draped in usual sterile fashion. Under ultrasound guidance, using lidocaine for local anesthesia, a 19-gauge Passlogix catheter was inserted in the pleural cavity until effusion was aspirated. Using Vacutainer bottles, 755 mL of clear yellow effusion was drained. The catheter was removed and the aspiration site dressed. The patient tolerated procedure well. No immediate complications were identified. Subsequent chest radiograph demonstrated no evidence of pneumothorax. The patient left the radiology department in stable condition. Radiology nurse monitored patient's  vital signs throughout the procedure which lasted approximately 30 minutes.        Chest x-ray reviewed, shows small left-sided effusion, slightly congested      IMPRESSION AND SUGGESTION:  Patient is at risk due to   · Exudative, lymphocyte predominant pleural effusion, negative for malignancy on cytology, repeat chest x-ray shows only small pleural effusion on the left  · Left upper quadrant pain with tenderness, likely muscular strain no guarding or rebound  · Chronic hypoxia, possibly related to underlying cardiac etiology, need to rule out obstructive airway disease  · Volume overload    Recommendation  · Lasix 20 mg IV x1, in addition to maintenance daily dose, repeat kidney function and electrolyte tomorrow  · Will need follow-up as outpatient with repeat chest x-ray  · If pleural effusion present then will repeat thoracentesis and repeat cytology, if

## 2021-02-04 NOTE — CARE COORDINATION
Spoke with patients mitul Medrano to update of discharge plan and date, stated understanding.  Electronically signed by Sergio Raza RN on 2/4/2021 at 2:55 PM

## 2021-02-04 NOTE — CARE COORDINATION
[address] [place] [address] NOTE  Room: R249/R249-01  Admit Date: 2021       Date: 2021  Patient Name: Jose L Sepulveda        MRN: [id number]    : 1932  (80 y.o.)  Gender: female        [de-identified] DIAGNOSIS:   Diagnosis: impaired mobility and ADL's d/t CHF exacerbation    CO MORBIDITIES:      Past Medical History:   Diagnosis Date    Ascending aortic aneurysm ([facility] 2017    Charcot Arleen Tooth muscular atrophy     Depression     Descending aortic aneurysm ([facility] [facility] 2017    Essential hypertension 2018    Headache     HTN (hypertension)     Impaired mobility and activities of daily living     Lumbar stenosis with neurogenic claudication     Myelopathy ([facility]     Osteoarthritis      Past Surgical History:   Procedure Laterality Date    JOINT REPLACEMENT      THORACENTESIS Left 2021    total of 755 cc removed per Dr Db Cope specimen sent to lab        Restrictions  Restrictions/Precautions: Fall Risk  CASE MANAGEMENT    Social/Functional History  Social/Functional History  Lives With: Alone  Type of Home: House  Home Layout: One level  Home Access: Stairs to enter with rails  Entrance Stairs - Number of Steps: 2  Entrance Stairs - Rails: Both  Bathroom Shower/Tub: Tub/Shower unit  Bathroom Equipment: Grab bars in shower, Shower chair  Home Equipment: Rolling walker, Cane(infrequently uses equimpment and furniture walks)  ADL Assistance: [facility] [address]: Independent  Homemaking Responsibilities: Yes  Ambulation Assistance: Independent  Transfer Assistance: Independent  Additional Comments: Son stops over 2 times daily       Pts personal preferences: n/a    Pts assets/resources/support system: family    COVERAGE INFORMATION:Payor: MEDICARE / Plan: MEDICARE PART A AND B / Product Type: *No Product type* /       NURSING    / There is no height or weight on file to calculate BMI.     DIET LOW SODIUM 2 GM;  Dietary Nutrition Supplements: Standard High Calorie Oral Supplement    SpO2: 95 % (02/04/21 0717)  No active isolations    Skin Issues: No    Pain Managed: Yes    Bladder continence: Zeng, reason: retention    Bowel continence: Yes      Other: foot drop, O2 not at home      PHYSICAL THERAPY  Bed mobility:  Supine to Sit: Moderate assistance (02/03/21 1119)  Sit to Supine: Moderate assistance (02/03/21 1119)  Transfers:  Sit to Stand: Moderate Assistance (02/03/21 1119)  Gait:   Device: No Device (02/03/21 1120)  Assistance: Maximum assistance (02/03/21 1120)  Distance: 2 steps (02/03/21 1120)  Quality of Gait: sidesteps only due to reports of dizziness, assist for balance due to posterior LOB, weight shift assistance required (02/03/21 1120)  Stairs:     W/C mobility:     LTG:  Long term goal 1: indep bed mobility  Long term goal 2: indep bed transfers  Long term goal 3: indep gait with ww or without device 50 feet -  feet  Long term goal 4: SBA 4 stairs  Long term goal 5: 20/56 for Bernard balance  PT Treatment Time:  1.5 hrs      OCCUPATIONAL THERAPY  Hand Dominance: Right  ADL  Feeding: Setup(Patient insisted that therapist open her syrup despite encouragment to attempt it herself) (02/04/21 0901)  Grooming: Supervision (02/03/21 1636)  UE Bathing: Supervision (02/03/21 1636)  LE Bathing: Minimal assistance (02/03/21 1636)  UE Dressing: Unable to assess(comment)(nimco) (02/03/21 1636)  LE Dressing: Verbal cueing;Maximum assistance (02/03/21 1636)  Toileting: (Patient was able to perform rear francisco cleaning. Patient did not have on lower body clothing at this time) (02/04/21 0901)  Toilet Transfers  Toilet - Technique: Ambulating (02/04/21 0900)  Equipment Used: Grab bars (02/04/21 0900)  Toilet Transfer: Minimal assistance (02/04/21 0900)  Toilet Transfers Comments: needed min assist to get off of the toilet (02/04/21 0900)     Shower Transfers  Shower - Transfer From: Hector Brito (02/03/21 2507)  Shower - Transfer Type:  To and Discharge planning needs:          - Intervention / Plan:  [x]  Weekly team conference      []  family training        - Results:         5.            - Intervention / Plan:          - Results:         6.            - Intervention / Plan:         - Results:         7.            - Intervention / Plan:         - Results:           Discharge Plan   Estimated Length of Stay: 14 days    Tentative Discharge date: 2/11/21      Anticipated Discharge Destination:  Home      Team recommendations:    1. Follow up Therapy :    PT  OT  SLP  RN  Social Work  [place] Aide    2. Home Health    Other:     Equipment needed at Discharge:  Other: TBD      Team Members Present at Conference:    Physician: Dr. Saqib Velarde  : Savanna Young, RN  RN: Lydia Pratt, RN  Physical Therapist: Dagoberto Scanlon, LYNN  Occupational Therapist:Jennifer Lee  Speech Therapist: Erna Clark, SLP  Nurse Manager: Adan Oswald, RN    Electronically signed by Marilou Galvan, RN on 2/4/2021 at 9:17 AM

## 2021-02-04 NOTE — PROGRESS NOTES
INDIVIDUALIZED OVERALL REHAB PLAN OF CARE  ADDENDUM TO REHAB PROGRESS NOTE-for audit purposes must also refer to this day's clinical note and combine the information      Date: 2021  Patient Name: Arnel Nicole   Room: X387/L437-12    MRN: [id number]    : 1932  (80 y.o.)  Gender: female       Today 2021 during weekly team meeting, I reviewed the patient Arnel Nicole in detail with the therapists and nurses involved in patient's care gathering complex physiatric data regarding current medical issues, progress in therapies, factors limiting progress, social issues, psychological issues, ongoing therapeutic plans and discharge planning. Legend:  I= independent Im =Modified independent  S=Supervised SB=stand by MAGANA=set up CG=contact fran Min= minimal Mod=Moderate Max=maximal Max of 2 =maximal assist of 2 people      CURRENT FUNCTIONAL STATUS:    NURSING ISSUES:         Nursing will continue to focus on bowel and bladder continence transitioning toward independence by time of discharge. Monitoring post void residuals monitoring for severe constipation and bowel obstruction. Focus on achieving ADL goals with co-treating with OT when possible. PHYSICAL THERAPY  Bed mobility:  Supine to Sit: Moderate assistance (21 1119)  Sit to Supine:  Moderate assistance (21 1119)  Transfers:  Sit to Stand: Contact guard assistance (21 1347)  Gait:   Device: Rolling Walker (21 1347)  Other Apparatus: O2(pt declined to don bilateral leg braces) (21 1011)  Assistance: Contact guard assistance (21 101)  Distance: 40 feet with seated rest. (21 101)  Quality of Gait: increased steppage gt due to not donning braces, mild trunk flexion, sob (21 1011)  Comments: 96% sats post gt on 2L02 (21 1011)  Stairs:  # Steps : 4 (21 1021)  Rails: Bilateral (21 1021)  Assistance: Contact guard assistance;Minimal assistance (21 102)  Comment: pt alternates between reciprocal and non-reciprocal (02/04/21 1021)  W/C mobility:         OCCUPATIONAL THERAPY  Hand Dominance: Right  ADL  Feeding: Setup(Patient insisted that therapist open her syrup despite encouragment to attempt it herself) (02/04/21 0901)  Grooming: Supervision(at seated level) (02/04/21 0919)  UE Bathing: (Patient declined to bathe stating that she bathed last night and she did not want to bathe again.) (02/04/21 0919)  LE Bathing: Minimal assistance (02/03/21 1636)  UE Dressing: Supervision;Setup (02/04/21 0919)  LE Dressing: Maximum assistance (02/04/21 0919)  Toileting: (Patient was able to perform rear francisco cleaning. Patient did not have on lower body clothing at this time) (02/04/21 0901)  Additional Comments: Patient was encouraged to don her socks from home and her shoes with attached braces. Patient declined and stated that she needs to wear long socks with the braces and the socks are at home. Patient was agreeable to donning her tennis shoes but insisted on wearing non skid socks in the shoes instead of regular socks (02/04/21 0919)  Toilet Transfers  Toilet - Technique: Ambulating (02/04/21 0900)  Equipment Used: Grab bars (02/04/21 0900)  Toilet Transfer: Minimal assistance (02/04/21 0900)  Toilet Transfers Comments: needed min assist to get off of the toilet (02/04/21 0900)     Shower Transfers  Shower - Transfer From: Stella Mcqueen (02/03/21 1638)  Shower - Transfer Type: To and From (02/03/21 1638)  Shower - Transfer To: Shower seat with back (02/03/21 1638)  Shower - Technique: Ambulating (02/03/21 1638)  Shower Transfers: Contact Guard (02/03/21 1638)      SPEECH THERAPY               Diet/Swallow:                           COGNITION  OT: Cognition Comment: Comp: Supervision, Express:  Independent, Social: Independent, Prob: Min A, Mem: N/A  SP:            THERAPY, MEDICAL AND NURSING COORDINATION:    []  Pain medication before therapies     []  Check orthostatic BP      [x]  Ambulate to the bathroom in room    [x]  Add scheduled rest beaks     []  In room therapies      Discharge date set for:              2/11/21      Home with:   fam  with help from   fam            And:      Home Health Care:     [x]  PT    []  OT    []  ST   [x]  Aide   []  SW    [x]  RN                    Outpatient Therapy:  []  PT    []  OT    []  ST   []  Rehab Psych                 Equipment:  WW      At D/C their function is goaled at:   PT:Long term goal 1: indep bed mobility  Long term goal 2: indep bed transfers  Long term goal 3: indep gait with ww or without device 50 feet -  feet  Long term goal 4: SBA 4 stairs  Long term goal 5: 20/56 for Bernard balance  OT:Eating  Assistance Needed: Setup or clean-up assistance  CARE Score: 5  Discharge Goal: Independent, Oral Hygiene  Assistance Needed: Supervision or touching assistance  CARE Score: 4  Discharge Goal: Independent, [address] [place]  Reason if not Attempted: Not applicable(pt did not need to go)  CARE Score: 9  Discharge Goal: Independent, Shower/Bathe Self  Assistance Needed: Partial/moderate assistance  CARE Score: 3  Discharge Goal: Independent  Upper Body Dressing  Reason if not Attempted: Not applicable(gown)  CARE Score: 9  Discharge Goal: Independent, Lower Body Dressing  Assistance Needed: Partial/moderate assistance  CARE Score: 3  Discharge Goal: Independent, Putting On/Taking Off Footwear  Assistance Needed: Partial/moderate assistance  CARE Score: 3  Discharge Goal: Independent,    SP:               From a cognitive standpoint they will need:        24 hr supervision  --progress to occasional           Significant problems/ barriers to functional progress include: Pt is at a high risk for functional loss,    [x]  Acute infection/UTI    []  Low BP's     []  COPD flare-up   []  Uncontrolled blood sugar     []  Progressive anemia         []  Severe pain exacerbation     []  Impaired mental status    []  Urinary incontinence    []  Bowel incontinence           Plan to correct barriers to functional progress: Add scheduled rest breaks, control pain by using ice Lidoderm rest and massage as well as pain medications prior to therapy. Based on a comprehensive evaluation of the above, the individualized therapy and Discharge plan will be:    -Times stated are an average that will be varied based on the patient's daily need. PT  1 1/2  hrs/day 5-7 days per week           OT  1 1/2hrs per day 5-7 days per week ST ____1/2__1_hrs /day 3-5 days per week       Estimated LOS 2 week(s)    - Overall functional prognosis:     [x]  Good    []  Fair    []  Poor -Medical Prognosis:   [x]  Good    []  Fair    []  Poor    This patient was made aware of the discussion of Plan of Care, their projected dicharge date and their projected function at discharge.    John Hemphill DO

## 2021-02-04 NOTE — PROGRESS NOTES
Subjective: The patient complains of ,\" moderate acute on chronic progressive fatigue and    partially relieved by rest,medications, Pt, OT, and rest and exacerbated by recent illness. I am concerned about patients medical complexities. Pleurel effusions tapped. ROS x10: The patient also complains of severely impaired mobility and activities of daily living. Otherwise no new problems with vision, hearing, nose, mouth, throat, dermal, cardiovascular, GI, , pulmonary, musculoskeletal, psychiatric or neurological. See Rehab H&P on Rehab chart dated . Vital signs:  /73   Pulse 66   Temp 97 °F (36.1 °C) (Oral)   Resp 16   SpO2 96%     Vitals:    02/04/21 1447   BP:    Pulse: 66   Resp:    Temp:    SpO2:        I/O:   PO/Intake:  fair PO intake, no problems observed or reported. Bowel/Bladder:  continent, no problems noted. General:  Patient is well developed, adequately nourished, non-obese and     well kempt. HEENT:    PERRLA, hearing intact to loud voice, external inspection of ear     and nose benign. Inspection of lips, tongue and gums benign  Musculoskeletal: No significant change in strength or tone. All joints stable. Inspection and palpation of digits and nails show no clubbing,       cyanosis or inflammatory conditions. Neuro/Psychiatric: Affect: flat but pleasant. Alert and oriented to person, place and     Situation with    cues. No significant change in deep tendon reflexes or     sensation  Lungs:  Diminished, CTA-B. Respiration effort is normal at rest.     Heart:   S1 = S2, RRR. No loud murmurs. Abdomen:  Soft, non-tender, no enlargement of liver or spleen. Extremities:  No significant lower extremity edema or tenderness. Skin:   Intact to general survey, no visualized or palpated problems.     Rehabilitation:  Physical therapy:   Bed Mobility:      Transfers: Sit to Stand: Contact guard assistance  Stand to sit: Contact guard assistance, Ambulation 1  Surface: carpet  Device: Rolling Walker  Other Apparatus: O2(pt declined to don bilateral leg braces)  Assistance: Contact guard assistance  Quality of Gait: increased steppage gt due to not donning braces, mild trunk flexion, sob  Gait Deviations: Slow Lea, Increased JAMES  Distance: 40 feet with seated rest.  Comments: 96% sats post gt on 2L02, Stairs  # Steps : 4  Stairs Height: 6\"  Rails: Bilateral  Device: No Device  Assistance: Contact guard assistance, Minimal assistance  Comment: pt alternates between reciprocal and non-reciprocal    FIMS:  ,  , Assessment: Pt reports dizziness and experieinced LOB in standing. Unable to continue with full therapy session. Pt is demonstrating deficits as listed. She is requiring assistance for mobility at this time. Pt would benefit from continued PT prior to safe return to home    Occupational therapy:    ,  , Assessment: Pt is an 79 y/o female from home alone who presents to [address] [place] [address] with the above deficits which impact her independence and safety during ADLs. Pt would benefit from OT services to maximize independence and safety during ADLs.     Speech therapy:        Lab/X-ray studies reviewed, analyzed and discussed with patient and staff:   Recent Results (from the past 24 hour(s))   Basic Metabolic Panel    Collection Time: 02/04/21  5:19 AM   Result Value Ref Range    Sodium 144 135 - 144 mEq/L    Potassium 4.0 3.4 - 4.9 mEq/L    Chloride 108 (H) 95 - 107 mEq/L    CO2 27 20 - 31 mEq/L    Anion Gap 9 9 - 15 mEq/L    Glucose 99 70 - 99 mg/dL    BUN 22 8 - 23 mg/dL    CREATININE 0.81 0.50 - 0.90 mg/dL    GFR Non-African American >60.0 >60    GFR  >60.0 >60    Calcium 8.7 8.5 - 9.9 mg/dL   CBC    Collection Time: 02/04/21  5:19 AM   Result Value Ref Range    WBC 8.6 4.8 - 10.8 K/uL    RBC 3.50 (L) 4.20 - 5.40 M/uL    Hemoglobin 10.2 (L) 12.0 - 16.0 g/dL    Hematocrit 31.0 (L) 37.0 - 47.0 %    MCV 88.7 82.0 - 100.0 fL    MCH 29.3 27.0 - 31.3 pg MCHC 33.0 33.0 - 37.0 %    RDW 14.2 11.5 - 14.5 %    Platelets 487 008 - 853 K/uL       Echo Complete 2d W Doppler W Color    Result Date: 1/27/2021  Transthoracic Echocardiography Report (TTE)  Demographics  Patient Name   Maurizio Wells        Gender              Female                 Lina Gao  Patient Number [id number]          Race                                                   Ethnicity  Visit Number   [id number]         Room Number         O744  Corporate ID                     Date of Study       01/27/2021  Accession      [id number]        Referring Physician  Number  Date of Birth  07/12/1932        Sonographer         Angus Espinoza [facility]  Age            80 year(s)        Interpreting        [facility])                                   Physician           Cardiology                                                       Kiesha Orozco MD Procedure Type of Study  TTE procedure:ECHO COMPLETE 2D W/DOP W/COLOR. Procedure Date Date: 01/27/2021 Start: 10:26 AM Study Location: Portable Technical Quality: Adequate visualization Indications:Congestive heart failure. Patient Status: Routine Height: 64 inches Weight: 165 pounds BSA: 1.8 m^2 BMI: 28.32 kg/m^2 BP: 108/77 mmHg  Conclusions  Summary  Mitral annular calcification is present. 1+ MR  Left ventricular ejection fraction is visually estimated at 40%. E/A flow reversal noted. Suggestive of diastolic dysfunction. Signature  ----------------------------------------------------------------  Electronically signed by Kiesha Orozco MD(Interpreting  physician) on 01/27/2021 11:37 AM  ----------------------------------------------------------------  Findings Left Ventricle Left ventricular ejection fraction is visually estimated at 40%. E/A flow reversal noted. Suggestive of diastolic dysfunction. Right Ventricle Normal right ventricle structure and function. Normal right ventricle systolic pressure. Left Atrium Moderately dilated left atrium.  Right Atrium Moderately enlarged right atrium size. Mitral Valve Mitral annular calcification is present. 1+ MR Tricuspid Valve Normal tricuspid valve structure and function. 1-2+ TR RVSP 38 mmHg Aortic Valve Aortic valve leaflets are moderately thickened. No AS No AR Pulmonic Valve The pulmonic valve was not well visualized . Pericardial Effusion No evidence of pericardial effusion. Pleural Effusion No evidence of pleural effusion. Aorta \ Miscellaneous The aorta is within normal limits. M-Mode Measurements (cm)  LVIDd: 4.39 cm                         LVIDs: 3.55 cm  IVSd: 1.43 cm                          IVSs: 1.55 cm  LVPWd: 1.5 cm                          LVPWs: 1.48 cm  Rt. Vent.  Dimension: 3.74 cm           AO Root Dimension: 3.62 cm                                         ACS: 1.25 cm                                         LA: 4.54 cm                                         LVOT: 2.04 cm Doppler Measurements:  AV Velocity:0.02 m/s                    MV Peak E-Wave: 0.85 m/s  AV Peak Gradient: 10.44 mmHg            MV Peak A-Wave: 0.87 m/s  AV Mean Gradient: 6.01 mmHg  AV Area (Continuity):1.83 cm^2  TR Velocity:2.62 m/s                    Estimated RAP:10 mmHg  TR Gradient:27.54 mmHg                  RVSP:37.54 mmHg Valves  Mitral Valve  Peak E-Wave: 0.85 m/s                 Peak A-Wave: 0.87 m/s                                        E/A Ratio: 0.97                                        Peak Gradient: 2.88 mmHg                                        Deceleration Time: 232.8 msec  Tissue Doppler  E' Septal Velocity: 0.07 m/s  E' Lateral Velocity: 0.08 m/s  Aortic Valve  Peak Velocity: 1.62 m/s                Mean Velocity: 1.15 m/s  Peak Gradient: 10.44 mmHg              Mean Gradient: 6.01 mmHg  Area (continuity): 1.83 cm^2           Area (2D): 1.8 cm^2  AV VTI: 28.8 cm  Cusp Separation: 1.25 cm  Tricuspid Valve  Estimated RVSP: 37.54 mmHg              Estimated RAP: 10 mmHg  TR Velocity: 2.62 m/s                   TR Gradient: 27.54 mmHg  Pulmonic Valve  Peak Velocity: 0.96 m/s           Peak Gradient: 3.66 mmHg                                    Estimated PASP: 37.54 mmHg  LVOT  Peak Velocity: 0.89 m/s              Mean Velocity: 0.57 m/s  Peak Gradient: 3.16 mmHg             Mean Gradient: 1.58 mmHg  LVOT Diameter: 2.04 cm               LVOT VTI: 16.15 cm Structures  Left Atrium  LA Dimension: 4.54 cm                        LA Area: 11.81 cm^2  LA/Aorta: 1.25  LA Volume/Index: 44.64 ml /25 m^2  Left Ventricle  Diastolic Dimension: 2.73 cm         Systolic Dimension: 2.41 cm  Septum Diastolic: 7.60 cm            Septum Systolic: 3.90 cm  PW Diastolic: 1.5 cm                 PW Systolic: 8.02 cm                                       FS: 19.1 %  LV EDV/LV EDV Index: 87.32 ml/49 m^2 LV ESV/LV ESV Index: 52.68 ml/29 m^2  EF Calculated: 39.7 %                LV Length: 6.12 cm  LVOT Diameter: 2.04 cm  Right Atrium  RA Systolic Pressure: 10 mmHg  Right Ventricle  Diastolic Dimension: 2.37 cm                                    RV Systolic Pressure: 31.16 mmHg Aorta/ Miscellaneous Aorta  Aortic Root: 3.62 cm  LVOT Diameter: 2.04 cm    Cta Chest W Wo Contrast    Result Date: 1/26/2021  EXAMINATION:  CHEST CTA WITH CONTRAST (PULMONARY EMBOLISM PROTOCOL) CLINICAL HISTORY:   PE   I50.43 Acute on chronic combined systolic and diastolic CHF (congestive heart failure) (HCC) ICD10. Technique:  Spiral axial CT acquisition of the chest from the thoracic inlet to the upper abdomen following IV contrast.  2-D images were reconstructed in the sagittal and coronal planes. 3-D MIPS images were generated in the coronal and axial planes. Images were reviewed on the PACS workstation. All images including the 3-D MIPS were personally archived.  Contrast:  IV administration of 100 ml Isovue 300 All CT scans at this facility use dose modulation, iterative reconstruction, and/or weight based dosing when appropriate to reduce radiation dose to as low as reasonably 3/15/2019. No lung consolidative opacity. Areas of probable atelectasis/scarring. Xr Chest Portable    Result Date: 1/26/2021  EXAMINATION: Portable AP ERECT view of the chest. CLINICAL HISTORY:  SOB , Negative Covid-19 test. DATE: 1/25/2021 5:41 PM COMPARISONS: 7/24/2017 FINDINGS: The heart is moderately enlarged, similar to prior exam.  Prominent interstitial markings, consistent with increasing Central vascular congestion. The costophrenic angles are blunted secondary to pleural effusions bilaterally, left greater than  right. No pneumothorax. There are infiltrates/atelectasis within lung bases, left greater than right. There are moderate degenerative changes in spine. CARDIAC ENLARGEMENT WITH CENTRAL VESSEL CONGESTION AND BILATERAL PLEURAL EFFUSIONS, POSSIBLE CONGESTIVE HEART FAILURE. BIBASILAR INFILTRATES/ATELECTASIS, LEFT GREATER THAN RIGHT. Previous extensive, complex labs, notes and diagnostics reviewed and analyzed. ALLERGIES:    Allergies as of 02/02/2021 - Review Complete 02/02/2021   Allergen Reaction Noted    Latex  07/19/2017    Tape [adhesive tape]  06/28/2016      (please also verify by checking [facility])     Complex Physical Medicine & Rehab Issues Assess & Plan:   1. Severe abnormality of gait and mobility and impaired self-care and ADL's secondary to progressive Cardiac rehab CHF exacerbation . Functional and medical status reassessed regarding patients ability to participate in therapies and patient found to be able to participate in acute intensive comprehensive inpatient rehabilitation program including PT/OT to improve balance, ambulation, ADLs, and to improve the P/AROM. Therapeutic modifications regarding activities in therapies, place, amount of time per day and intensity of therapy made daily. In bed therapies or bedside therapies prn.   2. Bowel and Bladder dysfunction  :  frequent toileting, ambulate to bathroom with assistance, check post void residuals.   Check for C.difficile x1 if >2 loose stools in 24 hours, continue bowel & bladder program.  Monitor bowel and bladder function. Lactinex 2 PO every AC. MOM prn, Brown Bomb prn, Glycerin suppository prn, enema prn. 3. Moderate   pain as well as generalized OA pain: reassess pain every shift and prior to and after each therapy session, give prn Tylenol and   , modalities prn in therapy, masage, Lidoderm, K-pad prn. Consider scheduled AM pain meds. 4. Skin healing and breakdown risk:  continue pressure relief program.  Daily skin exams and reports from nursing. 5. Severe fatigue due to nutritional and hydration deficiency: Add and titrate vitamin B12 vitamin D and CoQ10 continue to monitor I&Os, calorie counts prn, dietary consult prn.  6. Acute episodic insomnia with situational adjustment disorder:  prn Ambien, monitor for day time sedation. 7. Falls risk elevated:  patient to use call light to get nursing assistance to get up, bed and chair alarm. 8. Elevated DVT risk: progressive activities in PT, continue prophylaxis PORTILLO hose, elevation and  see mar . 9. Complex discharge planning:  Weekly team meeting every Monday Thursday to assess progress towards goals, discuss and address social, psychological and medical comorbidities and to address difficulties they may be having progressing in therapy. Patient and family education is in progress. The patient is to follow-up with their family physician after discharge.         Complex Active General Medical Issues that complicate care Assess & Plan:    Patient Active Problem List   Diagnosis    Lumbar stenosis with neurogenic claudication    Acquired scoliosis    Osteoporosis    Charcot Arleen Tooth muscular atrophy    Excess weight    Osteoarthritis of lumbar spine with myelopathy    Osteoarthritis    Myelopathy ([address]    Impaired mobility and activities of daily living due to [employer], severe lumbar canal stenosis s/p Rt and Rui L3-4, L4-5 microdissection and decompression, [facility] admit 6/23/16    Headache    Depression    Ascending aortic aneurysm (HCC)    Descending aortic aneurysm (HCC)    Dizziness    Shortness of breath    Essential hypertension    Acute on chronic combined systolic and diastolic CHF (congestive heart failure) (HCC)    Gait abnormality    Paroxysmal atrial fibrillation ([facility] [address]   1. appropriate for rehab once medically cleared  Carmen Joel D.O., PM&R     Attending    [address]

## 2021-02-04 NOTE — PROGRESS NOTES
Occupational Therapy  Facility/Department: [facility]  Daily Treatment Note  NAME: Mercedes Ritter  : 1932  MRN: [id number]    Date of Service: 2021    Discharge Recommendations:  Continue to assess pending progress       Assessment      Activity Tolerance  Activity Tolerance: Patient Tolerated treatment well  Activity Tolerance: 2L O2  Safety Devices  Safety Devices in place: Yes  Type of devices: All fall risk precautions in place         Patient Diagnosis(es): There were no encounter diagnoses. has a past medical history of Ascending aortic aneurysm ([address] Charcot Arleen Tooth muscular atrophy, Depression, Descending aortic aneurysm ([address] Essential hypertension, Headache, HTN (hypertension), Impaired mobility and activities of daily living, Lumbar stenosis with neurogenic claudication, Myelopathy ([address] and Osteoarthritis. has a past surgical history that includes joint replacement and thoracentesis (Left, 2021). Restrictions  Restrictions/Precautions  Restrictions/Precautions: Fall Risk     Subjective   General  Chart Reviewed: Yes  Patient assessed for rehabilitation services?: Yes  Family / Caregiver Present: No  Referring Practitioner: Dr. Judie Avilez  Diagnosis: Impaired mobility and ADLs d/t CHF exacerbation    Subjective  Subjective: \"I do.\" - patient pointing to activity placed in front of her. Pain Assessment  Pain Level: 0  Pre Treatment Pain Screening  Pain at present: 0     Orientation  Orientation  Overall Orientation Status: Within Functional Limits     Objective      Patient engaged in B UE ROM/strengthening, B FM coordination and cognition to increase I with ADL's, IADL's and transfers. Patient donned B 1 # wrist weights. Patient able to reach in various vertical planes with MIN difficulty. Patient placed 52 various sized rubber washers on matching vertical dowel rods of varying heights. Patient with 0 difficulty picking up rubber washers.    Patient with MAX difficulty matching rubber washers to correct dowel rods. Rest breaks as needed. Patient engaged in B UE ROM/strengthening, B FM coordination and cognition to increase I with ADL's, IADL's and transfers. Patient donned B 1 # wrist weights. Patient able to reach in lateral planes with 0 difficulty. Patient able to reach in forward planes with MIN difficulty. Patient able to  100 large mushroom pegs with 0 difficulty 1 at a time. Patient able to place large pegs 1 at a time in pegboard with MIN difficulty. Patient alternated UE's after every 10th peg. Patient with RB's as needed.          Plan   Plan  Times per week: 5-7x  Plan weeks: 5-7 days  Current Treatment Recommendations: Strengthening, Endurance Training, Neuromuscular Re-education, Self-Care / ADL, Balance Training, Functional Mobility Training, Cognitive Reorientation, Home Management Training, Safety Education & Training, Equipment Evaluation, Education, & procurement, Patient/Caregiver Education & Training    Plan Comment: Continue per OT POC for planned d/c on 2-11-21         Goals  Patient Goals   Patient goals : Not stated at this time       Therapy Time   Individual Concurrent Group Co-treatment   Time In 1530         Time Out 1600         Minutes 30             Therapeutic activities: 30 minutes     Electronically signed by PRUDENCE Camacho on 2/4/21 at 4:27 PM EST    PRUDENCE Camacho

## 2021-02-04 NOTE — PROGRESS NOTES
Minimal assistance  Toilet Transfers Comments: needed min assist to get off of the toilet        Plan   Plan  Times per week: 5-7x  Plan weeks: 5-7 days  Current Treatment Recommendations: Strengthening, Endurance Training, Neuromuscular Re-education, Self-Care / ADL, Balance Training, Functional Mobility Training, Cognitive Reorientation, Home Management Training, Safety Education & Training, Equipment Evaluation, Education, & procurement, Patient/Caregiver Education & Training    Goals  Patient Goals   Patient goals : Not stated at this time       Therapy Time   Individual Concurrent Group Co-treatment   Time In  [id number]         Time Out 0750         Minutes 15              ADL training: 15 minutes    Gabriela Briscoe OTR/L    Electronically signed by OSMANI Osborn/L on 2/4/2021 at 9:15 AM

## 2021-02-04 NOTE — PROGRESS NOTES
Physical Therapy Rehab Treatment Note  Facility/Department: [facility]  Room: R2/R249-01       NAME: Concetta Louie  : 1932 (80 y.o.)  MRN: [id number]  CODE STATUS: Full Code    Date of Service: 2021  Chart Reviewed: Yes  Family / Caregiver Present: No  General Comment  Comments: agreeable to tx    Restrictions:  Restrictions/Precautions: Fall Risk       SUBJECTIVE: Subjective: \"this is the first time i went to the bathroom (bm) in a while\"  Pain Screening  Patient Currently in Pain: Denies       Post Treatment Pain Screening: pointed to her stomach when asked if any pain. Pt c/o increased stomach pain with increased ambulation. Pain Assessment  Pain Assessment: 0-10  Pain Level: 0    OBJECTIVE:   Overall Orientation Status: Within Functional Limits    Neuromuscular Education  Neuromuscular Comments: step ups onto 4 inch block with min assist. very fatiguing for pt. ww needed for safety. Transfers  Sit to Stand: Contact guard assistance  Stand to sit: Contact guard assistance  Comment: improved steadiness overall. Ambulation  Ambulation?: Yes  Ambulation 1  Surface: carpet  Device: Rolling Walker  Other Apparatus: O2(pt declined to don bilateral leg braces)  Assistance: Contact guard assistance  Quality of Gait: increased steppage gt due to not donning braces, mild trunk flexion, sob  Gait Deviations: Slow Lea; Increased JAMES  Distance: 130 feet with 2 standing rests. Declined to sit down and rest.      Comments: 98% sats post gt on 2L02    Exercises  Physio/Swiss Ball: longseated position, lateral flexion and knee flexion. x 20 . pt stated it felt good to stretch legs out on ball. seated weighted ball with overhead flexion and flexion/ext  Comments: standing therex at ww.  marches x 20. cga.,squats x 10 with one lob-min assist to correct. pt unable to raise up on toes due to bilateral drop foot. Seated lower extremity therex: x 10 marches, x 10 laq. X 2 sets.      ASSESSMENT/COMMENTS:pt improving with tasks but is still very sob with activity and c/o stomach pain with increased activity. Upon return to room asked pt if she wanted me to turn television on and she said it makes her dizzy after 5 minutes. Asked her if reading cause her dizziness and she said not as bad. PLAN OF CARE/Safety: ongoing.          Therapy Time:   Individual   Time In 1330   Time Out 1430   Minutes 60     Minutes:60      Transfer/Bed mobility training:15      Gait training:10      Neuro re education:15     Therapeutic ex:20      Nena Thakkar PTA, 02/04/21 at 2:07 PM

## 2021-02-04 NOTE — FLOWSHEET NOTE
Patient assessment completed earlier this shift. The patient has foot drop brace here. Patient has 02 on at 3 liters. No complaints of shortness of breath. No distress is noted.

## 2021-02-04 NOTE — PROGRESS NOTES
Pt report of feeling dizzy, obtained VS and updated Dr. Martha Hyde and Morelia Richard  W/cardiology. Held two blood pressure medications and updated Dr. Laurel Verma as requested by covering providers. Pt is sitting up eating lunch and was encouraged to drink more fluids. No reports of dizziness while eating lunch. No s/s of distress at this time and call light is w/in reach.  Electronically signed by Collin Fuller RN on 2/4/2021 at 12:10 PM

## 2021-02-04 NOTE — PROGRESS NOTES
Physical Therapy Rehab Treatment Note  [facility]Department[facility]  [facility]       NAME: Margy Ward  : 1932 (80 y.o.)  MRN: [id number]  CODE STATUS: Full Code    Date of Service: 2021  Chart Reviewed: Yes  Family / Caregiver Present: No  General Comment  Comments: agreeable to tx    Restrictions:  Restrictions/Precautions: Fall Risk       SUBJECTIVE: Subjective: \"i have pain here , pt pointed to left side and demonstrated exercise which  made her hurt\"  Pain Screening  Patient Currently in Pain: Yes       Post Treatment Pain Screening:3  Pain Assessment  Pain Assessment: Faces  Pain Level: 5  Pt states her pain is a \"little bit down\" from when she first arrived. OBJECTIVE:    Neuromuscular Education  Neuromuscular Comments: ambulation around bolsters with ww. pt able to maneuver with ww 3 times around course, increased sob but was safe. Transfers  Sit to Stand: Contact guard assistance  Stand to sit: Contact guard assistance  Comment: improved steadiness overall. Ambulation  Ambulation?: Yes  Ambulation 1  Surface: carpet  Device: Rolling Walker  Other Apparatus: O2(pt declined to don bilateral leg braces)  Assistance: Contact guard assistance  Quality of Gait: increased steppage gt due to not donning braces, mild trunk flexion, sob  Gait Deviations: Slow Lea; Increased JAMES  Distance: 40 feet with seated rest.  Comments: 96% sats post gt on 2L02  Additional ambulation 40 feet back to chair. Stairs/Curb  Stairs?: Yes  Stairs  # Steps : 8  Stairs Height: 6\"  Rails: Bilateral  Device: No Device  Assistance: Contact guard assistance;Minimal assistance  Comment: pt alternates between reciprocal and non-reciprocal        ASSESSMENT/COMMENTS:pt very motivated to participate and do all that she can. Gets frustrated at being sob with activity. PLAN OF CARE/Safety: ongoing.       Therapy Time:   Individual   Time In 5539   Time Out 1030   Minutes 35     Minutes:35 Transfer/Bed mobility training:10      Gait training:15      Neuro re education:10     Therapeutic ex:0      Jae Markham PTA, 02/04/21 at 10:22 AM

## 2021-02-05 LAB
ANION GAP SERPL CALCULATED.3IONS-SCNC: 9 MEQ/L (ref 9–15)
BUN BLDV-MCNC: 17 MG/DL (ref 8–23)
CALCIUM SERPL-MCNC: 8.5 MG/DL (ref 8.5–9.9)
CHLORIDE BLD-SCNC: 104 MEQ/L (ref 95–107)
CO2: 27 MEQ/L (ref 20–31)
CREAT SERPL-MCNC: 0.72 MG/DL (ref 0.5–0.9)
GFR AFRICAN AMERICAN: >60
GFR NON-AFRICAN AMERICAN: >60
GLUCOSE BLD-MCNC: 108 MG/DL (ref 70–99)
HCT VFR BLD CALC: 32.9 % (ref 37–47)
HEMOGLOBIN: 11 G/DL (ref 12–16)
MCH RBC QN AUTO: 29.4 PG (ref 27–31.3)
MCHC RBC AUTO-ENTMCNC: 33.6 % (ref 33–37)
MCV RBC AUTO: 87.7 FL (ref 82–100)
PDW BLD-RTO: 13.9 % (ref 11.5–14.5)
PLATELET # BLD: 255 K/UL (ref 130–400)
POTASSIUM SERPL-SCNC: 4.2 MEQ/L (ref 3.4–4.9)
RBC # BLD: 3.75 M/UL (ref 4.2–5.4)
SODIUM BLD-SCNC: 140 MEQ/L (ref 135–144)
WBC # BLD: 9.4 K/UL (ref 4.8–10.8)

## 2021-02-05 PROCEDURE — 99232 SBSQ HOSP IP/OBS MODERATE 35: CPT | Performed by: INTERNAL MEDICINE

## 2021-02-05 PROCEDURE — 6370000000 HC RX 637 (ALT 250 FOR IP): Performed by: PHYSICIAN ASSISTANT

## 2021-02-05 PROCEDURE — 97530 THERAPEUTIC ACTIVITIES: CPT

## 2021-02-05 PROCEDURE — 94640 AIRWAY INHALATION TREATMENT: CPT

## 2021-02-05 PROCEDURE — 85027 COMPLETE CBC AUTOMATED: CPT

## 2021-02-05 PROCEDURE — 2700000000 HC OXYGEN THERAPY PER DAY

## 2021-02-05 PROCEDURE — 97112 NEUROMUSCULAR REEDUCATION: CPT

## 2021-02-05 PROCEDURE — 6370000000 HC RX 637 (ALT 250 FOR IP): Performed by: INTERNAL MEDICINE

## 2021-02-05 PROCEDURE — 80048 BASIC METABOLIC PNL TOTAL CA: CPT

## 2021-02-05 PROCEDURE — 97535 SELF CARE MNGMENT TRAINING: CPT

## 2021-02-05 PROCEDURE — 97116 GAIT TRAINING THERAPY: CPT

## 2021-02-05 PROCEDURE — 36415 COLL VENOUS BLD VENIPUNCTURE: CPT

## 2021-02-05 PROCEDURE — 97110 THERAPEUTIC EXERCISES: CPT

## 2021-02-05 PROCEDURE — 6370000000 HC RX 637 (ALT 250 FOR IP): Performed by: PHYSICAL MEDICINE & REHABILITATION

## 2021-02-05 PROCEDURE — 1180000000 HC REHAB R&B

## 2021-02-05 RX ORDER — BUDESONIDE AND FORMOTEROL FUMARATE DIHYDRATE 160; 4.5 UG/1; UG/1
2 AEROSOL RESPIRATORY (INHALATION) 2 TIMES DAILY
Status: DISCONTINUED | OUTPATIENT
Start: 2021-02-05 | End: 2021-02-09 | Stop reason: HOSPADM

## 2021-02-05 RX ADMIN — ASPIRIN 81 MG: 81 TABLET, CHEWABLE ORAL at 08:17

## 2021-02-05 RX ADMIN — DILTIAZEM HYDROCHLORIDE 120 MG: 120 CAPSULE, COATED, EXTENDED RELEASE ORAL at 08:17

## 2021-02-05 RX ADMIN — RIVAROXABAN 20 MG: 20 TABLET, FILM COATED ORAL at 17:20

## 2021-02-05 RX ADMIN — POLYETHYLENE GLYCOL 3350 17 G: 17 POWDER, FOR SOLUTION ORAL at 08:22

## 2021-02-05 RX ADMIN — POTASSIUM CHLORIDE 20 MEQ: 20 TABLET, EXTENDED RELEASE ORAL at 08:16

## 2021-02-05 RX ADMIN — BUDESONIDE AND FORMOTEROL FUMARATE DIHYDRATE 2 PUFF: 160; 4.5 AEROSOL RESPIRATORY (INHALATION) at 16:40

## 2021-02-05 RX ADMIN — PANTOPRAZOLE SODIUM 40 MG: 40 TABLET, DELAYED RELEASE ORAL at 06:06

## 2021-02-05 RX ADMIN — FUROSEMIDE 20 MG: 20 TABLET ORAL at 08:17

## 2021-02-05 RX ADMIN — CARVEDILOL 3.12 MG: 3.12 TABLET, FILM COATED ORAL at 08:16

## 2021-02-05 RX ADMIN — LEVOTHYROXINE SODIUM 88 MCG: 88 TABLET ORAL at 06:06

## 2021-02-05 RX ADMIN — CITALOPRAM HYDROBROMIDE 20 MG: 20 TABLET ORAL at 08:17

## 2021-02-05 RX ADMIN — CARVEDILOL 3.12 MG: 3.12 TABLET, FILM COATED ORAL at 23:01

## 2021-02-05 ASSESSMENT — PAIN SCALES - GENERAL
PAINLEVEL_OUTOF10: 0
PAINLEVEL_OUTOF10: 0

## 2021-02-05 ASSESSMENT — ENCOUNTER SYMPTOMS
CHEST TIGHTNESS: 0
COLOR CHANGE: 0
SHORTNESS OF BREATH: 0
VOMITING: 0
NAUSEA: 0

## 2021-02-05 NOTE — PROGRESS NOTES
weeks: 5-7 days  Current Treatment Recommendations: Strengthening, Endurance Training, Neuromuscular Re-education, Self-Care / ADL, Balance Training, Functional Mobility Training, Cognitive Reorientation, Home Management Training, Safety Education & Training, Equipment Evaluation, Education, & procurement, Patient/Caregiver Education & Training    Plan Comment: Continue per OT POC for planned d/c on 2-11-21         Goals  Patient Goals   Patient goals : Not stated at this time       Therapy Time   Individual Concurrent Group Co-treatment   Time In 0700         Time Out 0715         Minutes 15             ADL training: 15 minutes      Electronically signed by PRUDENCE Mayo on 2/5/21 at 8:23 AM PRUDENCE Healy

## 2021-02-05 NOTE — PROGRESS NOTES
INPATIENT PROGRESS NOTES    PATIENT NAME: Ellen Zazueta  MRN: [id number]  SERVICE DATE:  February 5, 2021   SERVICE TIME:  1:15 PM      PRIMARY SERVICE: Pulmonary Disease    CHIEF COMPLAINTS: Pleural effusion    INTERVAL HPI: Patient seen and examined at bedside, Interval Notes, orders reviewed. Nursing notes noted    Patient report feeling better, however still short of breath, no chest pain, she does get chest tightness more when she lays flat, no nausea no vomiting    Review of system:     GI Abdominal pain No  Skin Rash No    Social History     Tobacco Use    Smoking status: Never Smoker    Smokeless tobacco: Never Used   Substance Use Topics    Alcohol use: No     Alcohol/week: 0.0 standard drinks         Problem Relation Age of Onset    Arthritis Mother     Arthritis Father     High Blood Pressure Father          OBJECTIVE    Body mass index is 27.55 kg/m². PHYSICAL EXAM:  Vitals:  /75   Pulse 84   Temp 99 °F (37.2 °C)   Resp 16   Ht 5' 3\" (1.6 m)   Wt 155 lb 8 oz (70.5 kg)   SpO2 95%   BMI 27.55 kg/m²     General: alert, cooperative, no distress  Head: normocephalic, atraumatic  Eyes:No gross abnormalities. ENT:  MMM no lesions  Neck:  supple and no masses  Chest : Good air movement, minimal rales at the base, faint wheezing anteriorly, nontender, tympanic  Heart[de-identified] Heart sounds are normal.  Regular rate and rhythm without murmur, gallop or rub. ABD:  symmetric, soft, slightly tender left upper quadrant similar to what noted in HPI, no guarding or rebound  Musculoskeletal : no cyanosis, no clubbing and no edema  Neuro:  Grossly normal  Skin: No rashes or nodules noted.   Lymph node:  no cervical nodes  Urology: Yes Zeng   Psychiatric: appropriate    DATA:   Recent Labs     02/04/21  0519 02/05/21  0525   WBC 8.6 9.4   HGB 10.2* 11.0*   HCT 31.0* 32.9*   MCV 88.7 87.7    255     Recent Labs     02/04/21  0519 02/05/21  0525    140   K 4.0 4.2   * 104   CO2 27 27 Mean Gradient: 6.01 mmHg  AV Area (Continuity):1.83 cm^2  TR Velocity:2.62 m/s                    Estimated RAP:10 mmHg  TR Gradient:27.54 mmHg                  RVSP:37.54 mmHg Valves  Mitral Valve  Peak E-Wave: 0.85 m/s                 Peak A-Wave: 0.87 m/s                                        E/A Ratio: 0.97                                        Peak Gradient: 2.88 mmHg                                        Deceleration Time: 232.8 msec  Tissue Doppler  E' Septal Velocity: 0.07 m/s  E' Lateral Velocity: 0.08 m/s  Aortic Valve  Peak Velocity: 1.62 m/s                Mean Velocity: 1.15 m/s  Peak Gradient: 10.44 mmHg              Mean Gradient: 6.01 mmHg  Area (continuity): 1.83 cm^2           Area (2D): 1.8 cm^2  AV VTI: 28.8 cm  Cusp Separation: 1.25 cm  Tricuspid Valve  Estimated RVSP: 37.54 mmHg              Estimated RAP: 10 mmHg  TR Velocity: 2.62 m/s                   TR Gradient: 27.54 mmHg  Pulmonic Valve  Peak Velocity: 0.96 m/s           Peak Gradient: 3.66 mmHg                                    Estimated PASP: 37.54 mmHg  LVOT  Peak Velocity: 0.89 m/s              Mean Velocity: 0.57 m/s  Peak Gradient: 3.16 mmHg             Mean Gradient: 1.58 mmHg  LVOT Diameter: 2.04 cm               LVOT VTI: 16.15 cm Structures  Left Atrium  LA Dimension: 4.54 cm                        LA Area: 11.81 cm^2  LA/Aorta: 1.25  LA Volume/Index: 44.64 ml /25 m^2  Left Ventricle  Diastolic Dimension: 0.99 cm         Systolic Dimension: 0.42 cm  Septum Diastolic: 4.40 cm            Septum Systolic: 7.94 cm  PW Diastolic: 1.5 cm                 PW Systolic: 4.48 cm                                       FS: 19.1 %  LV EDV/LV EDV Index: 87.32 ml/49 m^2 LV ESV/LV ESV Index: 52.68 ml/29 m^2  EF Calculated: 39.7 %                LV Length: 6.12 cm  LVOT Diameter: 2.04 cm  Right Atrium  RA Systolic Pressure: 10 mmHg  Right Ventricle  Diastolic Dimension: 0.75 cm                                    RV Systolic Pressure: 69.85 mmHg Aorta/ Miscellaneous Aorta  Aortic Root: 3.62 cm  LVOT Diameter: 2.04 cm    Xr Chest (2 Vw)    1. No evidence of pneumothorax. Resolution of left pleural effusion. Cardiac silhouette remains enlarged. Left lower lobe hazy opacity may represent atelectasis versus pneumonia or infiltrate. COMPARISON: No prior studies available for comparison. DIAGNOSIS:  Post left thoracentesis. Dr. Aure Ball to read. COMMENTS: I50.43 Acute on chronic combined systolic and diastolic CHF (congestive heart failure) (HCC) ICD10 TECHNIQUE: XR CHEST (2 VW) FINDINGS: Left lower lobe opacity may represent atelectasis versus pneumonia or infiltrate. Interval resolution of left pleural effusion. No evidence of pneumothorax. Cardiac silhouette is enlarged. Visualized soft tissues, and osseous structures are unremarkable. Cta Chest W Wo Contrast    Result Date: 1/26/2021  EXAMINATION:  CHEST CTA WITH CONTRAST (PULMONARY EMBOLISM PROTOCOL) CLINICAL HISTORY:   PE   I50.43 Acute on chronic combined systolic and diastolic CHF (congestive heart failure) ([address] ICD10. Technique:  Spiral axial CT acquisition of the chest from the thoracic inlet to the upper abdomen following IV contrast.  2-D images were reconstructed in the sagittal and coronal planes. 3-D MIPS images were generated in the coronal and axial planes. Images were reviewed on the PACS workstation. All images including the 3-D MIPS were personally archived. Contrast:  IV administration of 100 ml Isovue 300 All CT scans at this facility use dose modulation, iterative reconstruction, and/or weight based dosing when appropriate to reduce radiation dose to as low as reasonably achievable. Comparison:  CTA chest 3/15/2019  RESULT: Limitations: Motion artifact. Evaluation for thromboembolic disease:      - Right heart chambers:  No thromboembolic disease.      - Main pulmonary arteries:  No thromboembolic disease.      - Lobar pulmonary arteries:  No thromboembolic disease.         - Segmental pulmonary arteries:  No thromboembolic disease.        - Subsegmental pulmonary arteries:  Not well visualized due to motion. Lines, tubes, and devices:  None. Lung parenchyma and pleura: Tortuous course of the trachea. Central airways appear grossly patent. Moderate left and small right pleural effusions with associated atelectasis. Limitations in evaluation of the lung parenchyma secondary to motion. Other areas of probable scarring/atelectasis. No pneumothorax. Thoracic inlet, heart, and mediastinum: Visualized thyroid unremarkable. No axillary, mediastinal, or hilar lymphadenopathy. Ascending thoracic aorta aneurysmal, measuring around 5.0 cm, similar to prior. Descending thoracic aorta aneurysmal and measures  up to 4.8 cm, also similar to prior. Normal pulmonary artery size. Cardiomegaly, similar to prior Scattered coronary artery calcifications. Small pericardial effusion. Small hiatal hernia. Bones and soft tissues:  No destructive bone lesion or acute osseous findings. Osteopenia. Scoliosis and kyphosis and multilevel degenerative changes, grossly similar to prior. Chest wall unremarkable. Upper abdomen:  No acute abnormality in the imaged upper abdomen. Reflux of contrast into the hepatic veins, associated with right heart failure. Lower neck: Unremarkable. No CT evidence of pulmonary embolism. Moderate left and small right pleural effusions. Cardiomegaly and small pericardial effusion. Reflux of contrast into the hepatic veins, associated with right heart failure. 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 No lung consolidative opacity. Areas of probable atelectasis/scarring. Xr Chest Portable    Result Date: 2/4/2021  EXAMINATION: XR CHEST PORTABLE CLINICAL HISTORY: SHORTNESS OF BREATH, CHEST PAIN COMPARISONS: CTA CHEST, JANUARY 25, 2021. CHEST RADIOGRAPHS JANUARY 28, JANUARY 29, 2021.  FINDINGS: Osseous structures intact. Cardiopericardial silhouette enlarged and unchanged. Cephalization pulmonary vasculature. No focal airspace disease. Increased opacity left lower lung obscures left diaphragm. STABLE MARKED CARDIOMEGALY. PROBABLE INTERSTITIAL EDEMA. LEFT LOWER LUNG ATELECTASIS/PNEUMONIA. Xr Chest Portable    Result Date: 1/28/2021  EXAMINATION: CHEST PORTABLE VIEW  CLINICAL HISTORY: Short of breath COMPARISONS: None  FINDINGS: Single  views of the chest is submitted. The cardiac silhouette is enlarged. Pulmonary vascular unremarkable. Right sided trachea. Left lower lobe infiltrate/consolidation, collapse with left sided pleural effusion. LEFT LOWER LOBE INFILTRATE/CONSOLIDATION, COLLAPSE WITH LEFT SIDED PLEURAL EFFUSION    Xr Chest Portable    Result Date: 1/26/2021  EXAMINATION: Portable AP ERECT view of the chest. CLINICAL HISTORY:  SOB , Negative Covid-19 test. DATE: 1/25/2021 5:41 PM COMPARISONS: 7/24/2017 FINDINGS: The heart is moderately enlarged, similar to prior exam.  Prominent interstitial markings, consistent with increasing Central vascular congestion. The costophrenic angles are blunted secondary to pleural effusions bilaterally, left greater than  right. No pneumothorax. There are infiltrates/atelectasis within lung bases, left greater than right. There are moderate degenerative changes in spine. CARDIAC ENLARGEMENT WITH CENTRAL VESSEL CONGESTION AND BILATERAL PLEURAL EFFUSIONS, POSSIBLE CONGESTIVE HEART FAILURE. BIBASILAR INFILTRATES/ATELECTASIS, LEFT GREATER THAN RIGHT. Ir Guided Thoracentesis Pleural    Technically successful ultrasound-guided thoracentesis without immediate complications. HISTORY: Lorenza Wynne is a Female of 80 years age.  DIAGNOSIS: Left pleural effusion COMMENTS: I50.43 Acute on chronic combined systolic and diastolic CHF (congestive heart failure) ([facility] [address] ICD10 PROCEDURE: Following the discussion of procedure, risks versus benefits, possible complications, informed consent was obtained. Following universal protocol, patient and site verification was performed with a \"timeout\" prior to the procedure. Brief survey of the patient's left hemithorax demonstrate moderate amount of pleural effusion. After selection of an appropriate site for thoracentesis, the thoracic skin was prepped and draped in usual sterile fashion. Under ultrasound guidance, using lidocaine for local anesthesia, a 19-gauge Yueh catheter was inserted in the pleural cavity until effusion was aspirated. Using Vacutainer bottles, 755 mL of clear yellow effusion was drained. The catheter was removed and the aspiration site dressed. The patient tolerated procedure well. No immediate complications were identified. Subsequent chest radiograph demonstrated no evidence of pneumothorax. The patient left the radiology department in stable condition. Radiology nurse monitored patient's  vital signs throughout the procedure which lasted approximately 30 minutes.        Chest x-ray reviewed, shows small left-sided effusion, slightly congested      IMPRESSION AND SUGGESTION:  Patient is at risk due to   · Exudative, lymphocyte predominant pleural effusion, negative for malignancy on cytology, repeat chest x-ray shows only small pleural effusion on the left  · Wheezing, possible asthma component  · Chronic hypoxia, possibly related to underlying cardiac etiology, need to rule out obstructive airway disease  · Volume overload    Recommendation  ·   · Will need follow-up as outpatient with repeat chest x-ray  · If pleural effusion present then will repeat thoracentesis and repeat cytology, if negative then will need VATS and pleural biopsy  · PFT as outpatient  · Pulmonary hygiene  · O2 to keep sat 90 to 92%  · Symbicort      Electronically signed by Sima Vázquez MD,  [facility]   on 2/5/2021 at 1:15 PM

## 2021-02-05 NOTE — PROGRESS NOTES
Physical Therapy Rehab Treatment Note  Facility/Department: [facility]  Room: [place] [address]       NAME: Sara Lai  : 1932 (80 y.o.)  MRN: [id number]  CODE STATUS: Full Code    Date of Service: 2021  Chart Reviewed: Yes  Family / Caregiver Present: No  General Comment  Comments: agreeable to tx    Restrictions:  Restrictions/Precautions: Fall Risk       SUBJECTIVE: Subjective: pt adamantly refuses to don braces for legs  Pain Screening  Patient Currently in Pain: Denies       Post Treatment Pain Screening:  Pain Assessment  Pain Assessment: 0-10  Pain Level: 0    OBJECTIVE:   Overall Orientation Status: Within Functional Limits    Transfers  Sit to Stand: Stand by assistance  Stand to sit: Stand by assistance  Comment: safe technique with transfers    Ambulation  Ambulation?: Yes  Ambulation 1  Surface: carpet  Device: Rolling Walker  Other Apparatus: O2  Assistance: Contact guard assistance  Quality of Gait: mild trunk flexion, slow and steady, steppage gt to clear floor  Gait Deviations: Slow Lea; Increased JAMES  Distance: 80 feet with turns  Comments: 96 post gt. called RN Katie Moore to let her know pt experiences chest pains with gt. Katie Moore arrived to check bp. Stairs/Curb  Stairs?: No  Bernard Balance test   ASSESSMENT/COMMENTS:pt gets very frustrated at how her chest hurts with ambulation. She says it calms down at rest but she is concerned. Katie Moore her RN came to check on pt and states she is going to call the physician. PLAN OF CARE/Safety: ongoing.           Therapy Time:   Individual   Time In 1330   Time Out 1430   Minutes 60     Minutes:60      Transfer/Bed mobility training:10      Gait training:10      Neuro re education:20     Therapeutic ex:20      Toan Oneal PTA, 21 at 2:08 PM

## 2021-02-05 NOTE — PROGRESS NOTES
Patient resting comfortably in bed, positioned for comfort. Patient denies pain at this time. Patient has O2 running, NC, bed alarm is on, call light is within reach. Will continue to monitor.

## 2021-02-05 NOTE — PROGRESS NOTES
Physical Therapy Rehab Treatment Note  Facility/Department: [facility]  Room: R249/R249-01       NAME: Annetta Morrow  : 1932 (80 y.o.)  MRN: [id number]  CODE STATUS: Full Code    Date of Service: 2021  Chart Reviewed: Yes  Family / Caregiver Present: No  General Comment  Comments: agreeable to tx    Restrictions:  Restrictions/Precautions: Fall Risk       SUBJECTIVE: Subjective: \"today is good\"  Pain Screening  Patient Currently in Pain: Denies       Post Treatment Pain Screenin  Pain Assessment  Pain Assessment: 0-10  Pain Level: 0    OBJECTIVE:   Overall Orientation Status: Within Functional Limits  Bed mobility: sba into bed. Transfers  Sit to Stand: Stand by assistance  Stand to sit: Contact guard assistance    Ambulation  Ambulation?: Yes  Ambulation 1  Surface: carpet  Device: Rolling Walker  Other Apparatus: O2  Assistance: Contact guard assistance  Quality of Gait: increased steppage gt due to not donning braces, mild trunk flexion, sob, difficulty maintaining straight path  Gait Deviations: Slow Lea; Increased JAMES  Distance: 80 feet  Comments: 97% post gt sats on 2L02    Stairs/Curb  Stairs?: No         ASSESSMENT/COMMENTS: pt c/o chest pain with increased ambulation and states is goes away once she is sitting down. Reports this didn't happen prior to admission to hospital.        PLAN OF CARE/Safety: ongoing.          Therapy Time:   Individual   Time In 1000   Time Out 1030   Minutes 30     Minutes:30      Transfer/Bed mobility training:10      Gait training:10      Neuro re education:0     Therapeutic ex:10      Aditi Ramon PTA, 21 at 10:15 AM

## 2021-02-05 NOTE — PROGRESS NOTES
Progress Note  Patient: Donnamaria Kanner  Unit/Bed: L987/Q467-20  YOB: 1932  MRN: [id number]  Acct: [de-identified]   Admitting Diagnosis: Impaired mobility [Z74.09]  Date:  2/2/2021  Hospital Day: 3    Chief Complaint:  Impaired mobility/NICMP/Recent PA-fib RVR/nonobstructive CAD    Subjective      2/3/21: Patient was transferred to rehab yesterday following hospitalization for acute decompensated systolic heart failure and new nonischemic cardiomyopathy with EF of 40%. She underwent left heart catheterization on 2/1/2021 which showed 60% mid LAD lesion with negative IFR. During her hospitalization she was noted to have new onset paroxysmal A. fib RVR and was initiated on oral anticoagulation with Xarelto. Also, during her hospitalization, she was noted to have moderate sized left pleural effusion and underwent left thoracentesis on 1/28/2021 with aspiration of 755 mL pleural fluid. Analysis of pleural fluid showed exudative lymphocyte predominant pleural effusion with main concern for malignancy which will need monitored as outpatient with repeat CT chest in 6 to 8 weeks and outpatient follow-up with pulmonology for cytology results. She was optimized on medical therapy and stable for transfer to rehab yesterday. Patient resting comfortably in bed in no acute distress. Complaining of dizziness this morning and has a cool washcloth on her forehead currently. Denies chest pain or shortness of breath. Zeng catheter in place draining dark/turbid urine. A.m. labs reviewed. Renal function and electrolytes stable. Hemoglobin low but stable at 10.3.    2/5/2021: Resting comfort. Denies any chest pain or shortness of breath. Review of Systems:   Review of Systems   Constitutional: Negative for activity change. HENT: Negative for congestion. Respiratory: Negative for chest tightness and shortness of breath. Cardiovascular: Negative for chest pain, palpitations and leg swelling. Gastrointestinal: Negative for nausea and vomiting. Genitourinary: Zeng cathter in place   Skin: Negative for color change and pallor. Neurological: Positive for dizziness. Negative for syncope. Psychiatric/Behavioral: Negative for agitation and behavioral problems. Physical Examination:    /75   Pulse 84   Temp 99 °F (37.2 °C)   Resp 16   Ht 5' 3\" (1.6 m)   Wt 155 lb 8 oz (70.5 kg)   SpO2 95%   BMI 27.55 kg/m²    Physical Exam  Constitutional:       General: She is not in acute distress. Appearance: Normal appearance. HENT:      Head: Normocephalic and atraumatic. Neck:      Musculoskeletal: Normal range of motion and neck supple. Cardiovascular:      Rate and Rhythm: Normal rate and regular rhythm. Pulmonary:      Effort: Pulmonary effort is normal. No respiratory distress. Breath sounds: No wheezing, rhonchi or rales. Abdominal:      Palpations: Abdomen is soft. Tenderness: There is no abdominal tenderness. Musculoskeletal: Normal range of motion. Right lower leg: No edema. Left lower leg: No edema. Skin:     General: Skin is warm and dry. Neurological:      General: No focal deficit present. Mental Status: She is alert. Cranial Nerves: No cranial nerve deficit.    Psychiatric:         Mood and Affect: Mood normal.         Behavior: Behavior normal.         LABS:  CBC:   Lab Results   Component Value Date    WBC 9.4 02/05/2021    RBC 3.75 02/05/2021    HGB 11.0 02/05/2021    HCT 32.9 02/05/2021    MCV 87.7 02/05/2021    MCH 29.4 02/05/2021    MCHC 33.6 02/05/2021    RDW 13.9 02/05/2021     02/05/2021     CBC with Differential:   Lab Results   Component Value Date    WBC 9.4 02/05/2021    RBC 3.75 02/05/2021    HGB 11.0 02/05/2021    HCT 32.9 02/05/2021     02/05/2021    MCV 87.7 02/05/2021    MCH 29.4 02/05/2021    MCHC 33.6 02/05/2021    RDW 13.9 02/05/2021    LYMPHOPCT 9.0 01/25/2021    MONOPCT 8.7 01/25/2021 BASOPCT 0.4 01/25/2021    MONOSABS 1.0 01/25/2021    LYMPHSABS 1.0 01/25/2021    EOSABS 0.1 01/25/2021    BASOSABS 0.0 01/25/2021     CMP:    Lab Results   Component Value Date     02/05/2021    K 4.2 02/05/2021    K 4.1 01/25/2021     02/05/2021    CO2 27 02/05/2021    BUN 17 02/05/2021    CREATININE 0.72 02/05/2021    GFRAA >60.0 02/05/2021    LABGLOM >60.0 02/05/2021    GLUCOSE 108 02/05/2021    PROT 7.3 01/25/2021    LABALBU 3.5 01/25/2021    CALCIUM 8.5 02/05/2021    BILITOT 0.4 01/25/2021    ALKPHOS 57 01/25/2021    AST 23 01/25/2021    ALT 20 01/25/2021     BMP:    Lab Results   Component Value Date     02/05/2021    K 4.2 02/05/2021    K 4.1 01/25/2021     02/05/2021    CO2 27 02/05/2021    BUN 17 02/05/2021    LABALBU 3.5 01/25/2021    CREATININE 0.72 02/05/2021    CALCIUM 8.5 02/05/2021    GFRAA >60.0 02/05/2021    LABGLOM >60.0 02/05/2021    GLUCOSE 108 02/05/2021     Magnesium:    Lab Results   Component Value Date    MG 1.9 10/02/2020     Troponin:    Lab Results   Component Value Date    TROPONINI <0.010 01/26/2021       Radiology:      CTA Chest 1/25/21:  Impression       No CT evidence of pulmonary embolism.       Moderate left and small right pleural effusions.       Cardiomegaly and small pericardial effusion. Reflux of contrast into the hepatic veins, associated with right heart failure.       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       No lung consolidative opacity. Areas of probable atelectasis/scarring. Echocardiogram 1/27/21:  Conclusions   Summary   Mitral annular calcification is present. 1+ MR   Left ventricular ejection fraction is visually estimated at 40%. E/A flow reversal noted. Suggestive of diastolic dysfunction.    Signature   ----------------------------------------------------------------   Electronically signed by Salma Gonsalves MD(Interpreting   physician) on 01/27/2021 11:37 AM   ----------------------------------------------------------------   Findings  Left Ventricle  Left ventricular ejection fraction is visually estimated at 40%. E/A flow reversal noted. Suggestive of diastolic dysfunction. Right Ventricle  Normal right ventricle structure and function. Normal right ventricle systolic pressure. Left Atrium  Moderately dilated left atrium. Right Atrium  Moderately enlarged right atrium size. Mitral Valve  Mitral annular calcification is present. 1+ MR  Tricuspid Valve  Normal tricuspid valve structure and function. 1-2+ TR  RVSP 38 mmHg  Aortic Valve  Aortic valve leaflets are moderately thickened. No AS  No AR  Pulmonic Valve  The pulmonic valve was not well visualized . Pericardial Effusion  No evidence of pericardial effusion. Pleural Effusion  No evidence of pleural effusion. EKG 1/28/21: SR 77, ST depression with T wave inversion V1-V6, QTc 497ms        ASSESSMENT:        Acute on chronic decompensated combined congestive heart failure. Questionable right ventricular failure. NICMP EF of 40%. Likely pulmonary hypertension  Paroxysmal atrial fibrillation- normal sinus rhythm now. History of coronary disease- Mod LAD disease, negative IFR. History of SVT status post ablation  History of ascending and descending thoracic aortic aneurysm-stable  Essential hypertension  Left pleural effusion status post thoracentesis.          PLAN:   1. As always, aggressive risk factor modification is strongly recommended. We should adhere to the JNC VIII guidelines for HTN management and the NCEPATP III guidelines for LDL-C management. 2. Continue with Lasix p.o., monitor I's and O's and renal function  3. Max cardiac meds-cont with current cardiac meds. 4. Full dose anticoagulation, Xarelto  5. Check EKG periodically. 6. Pulmonary recommendations- follow up CT of chest in near future. 7. Further recommendations to follow.        Electronically signed by Ever Ya DO on 2/3/21 at 12:16 PM EST

## 2021-02-05 NOTE — PROGRESS NOTES
Occupational Therapy  Facility/Department: [facility]  Daily Treatment Note  NAME: Jessi Golden  : 1932  MRN: [id number]    Date of Service: 2021    Discharge Recommendations:  Continue to assess pending progress       Assessment      Activity Tolerance  Activity Tolerance: Patient Tolerated treatment well  Activity Tolerance: 2L O2  Safety Devices  Safety Devices in place: Yes  Type of devices: All fall risk precautions in place         Patient Diagnosis(es): There were no encounter diagnoses. has a past medical history of Ascending aortic aneurysm ([facility] [address] Charcot Arleen Tooth muscular atrophy, Depression, Descending aortic aneurysm ([facility] [address] Essential hypertension, Headache, HTN (hypertension), Impaired mobility and activities of daily living, Lumbar stenosis with neurogenic claudication, Myelopathy ([facility] [address] and Osteoarthritis. has a past surgical history that includes joint replacement and thoracentesis (Left, 2021). Restrictions  Restrictions/Precautions  Restrictions/Precautions: Fall Risk     Subjective   General  Chart Reviewed: Yes  Patient assessed for rehabilitation services?: Yes  Family / Caregiver Present: No  Referring Practitioner: Dr. Elaine Mascorro  Diagnosis: Impaired mobility and ADLs d/t CHF exacerbation    Subjective  Subjective: \"We go exercise. \"    Pain Assessment  Pain Level: 0  Pre Treatment Pain Screening  Pain at present: 0     Orientation  Orientation  Overall Orientation Status: Within Functional Limits     Objective      Wheelchair Bed Transfers  Wheelchair/Bed - Technique: Stand step  Equipment Used: Bed;Wheelchair; Other  Level of Asssistance: Stand by assistance  Wheelchair Transfers Comments: w/c -> EOB with ww      ADL    UE Dressing: Setup    LE Dressing: Minimal assistance(B shoes)    Wheelchair Bed Transfers  Wheelchair/Bed - Technique: Stand step  Equipment Used: Bed;Wheelchair; Other  Level of Asssistance: Stand by assistance  Wheelchair Transfers Comments:

## 2021-02-05 NOTE — PROGRESS NOTES
Hospitalist Progress Note  2/5/2021 3:17 PM    Assessment and Plan:   1. Generalized weakness, Gait instability and Decreased  Functional Status secondary to new onset cardiomyopathy: S/p cardiac cath. Cardiology managing. EF 40%. Cardiac cath showed moderate mid LAD stenosis with negative IFR. Fall precautions. PT OT to evaluate. Maximize nutrition status. Assessing if needs DME at home. SW on board. Dr. Amilcar Chris managing rehab plan. 2. PAF: Cardiology following. Rate controlled. Goal K and Mg 4.0 and 2.0, respectively. On Long term anticoagulation  3. HTN: Cardiology managing. Cozaar on hold. 4. CAD: Cardiology following and managing. 5. Pleural effusion: S/p thoracentesis. Concerned for malignancy. Pulmonary following. Will need frequent monitoring. May need VATS and pleural biopsy. F/U pulmonary outpatient with repeat CXR. If pleural effusion present then pulm will repeat thoracentesis and cytology. If negative, will need VATS and pleural biopsy. PFT outpatient. 6. Combined systolic diastolic heart failure: Cardiology following. On lasix. Goal K and Mg 4.0 and 2.0, respectively. On ASA, BB,  ACEI/ARB. Monitor weights. Monitor for signs/ symptoms of volume overload. Elevate lower exts while at rest  7. Thoracic Aortic Aneurysm: Stable. Cardiology following. 8. Hypothyroidism: continue synthroid  9. Depression: Continue celexa  10. 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 PT/OT  11. Bowel Regimen and GI PPx: stool softners PRN ordered with hold parameters for loose stools or diarrhea. On antiacid  12. Diet: DIET LOW SODIUM 2 GM;  13. Advance Directive: Full Code   14. Nutrition status: Supplemental Vitamins ordered. Dietitian assessment  15. Vaccinations: Immunization records reviewed. If has not received appropriate vaccinations, will order to be given prior to discharge. 16.  DVT prophylaxis: On xarelto  17. Discharge planning: SW on board. Will discharge once medically stable and cleared by all consultants. 18. High Risk Readmission Screening Tool Score Noted. Additionally, the following hospital problems were addressed: Active Problems:    Impaired mobility and activities of daily living due to [employer], severe lumbar canal stenosis s/p Rt and Rui L3-4, L4-5 microdissection and decompression, [facility] admit 6/23/16    Paroxysmal atrial fibrillation (HCC)  Resolved Problems:    * No resolved hospital problems. *      ** Total time spent reviewing medical records, evaluating patient, speaking with RN's and consultants where I was focused exclusively on this patient: 25 minutes. This time is excluding time spent performing procedures or significant events occurring earlier or later in the day requiring my attention and focus. Subjective:   Admit Date: 2/2/2021  PCP: Gill Au MD    No acute events overnight. Afebrile . Telemetry reviewed. No new complaints. Pt denies chest pain, SOB, N/V, fevers or chills. Objective:     Vitals:    02/05/21 1009 02/05/21 1030 02/05/21 1340 02/05/21 1345   BP:    102/70   Pulse:    71   Resp:    18   Temp:       TempSrc:       SpO2: 95%  96%    Weight:  155 lb 8 oz (70.5 kg)     Height:         General appearance: A+ O x2- 3. No conversational dyspnea noted. [place]  Dentition intact. Answers questions appropriately  Lungs: CTAB, Diminished. No exp wheezes, No rales, No retractions; No use of accessory muscles  Heart:  S1, S2 normal, RRR, no MRG appreciated  Abdomen: (+) BS, soft, non-tender; non distended no guarding or rigidity. Extremities:  no cyanosis, trace edema bilat lower exts, no calf tenderness bilaterally.  Dry skin noted   : taylor catheter,  Urine dark      Medications:      budesonide-formoterol  2 puff Inhalation BID    aspirin  81 mg Oral Daily    carvedilol  3.125 mg Oral BID    citalopram  20 mg Oral Daily    dilTIAZem  120 mg Oral Daily    furosemide  20 mg Oral Daily    levothyroxine  88 mcg Oral Daily    [Held by provider] losartan  25 mg Oral Daily    potassium chloride  20 mEq Oral Daily with breakfast    rivaroxaban  20 mg Oral Daily    pantoprazole  40 mg Oral QAM AC       LABS Reviewed    IMAGING Reviewed    Leatha Dennis CNP  Rounding Hospitalist

## 2021-02-05 NOTE — PROGRESS NOTES
Occupational Therapy  Facility/Department: [facility]  Daily Treatment Note  NAME: Mercedes Ritter  : 1932  MRN: [id number]    Date of Service: 2021    Discharge Recommendations:  Continue to assess pending progress       Assessment      Activity Tolerance  Activity Tolerance: Patient limited by fatigue  Activity Tolerance: 2L O2  Safety Devices  Safety Devices in place: Yes  Type of devices: All fall risk precautions in place         Patient Diagnosis(es): There were no encounter diagnoses. has a past medical history of Ascending aortic aneurysm ([address] Charcot Arleen Tooth muscular atrophy, Depression, Descending aortic aneurysm ([address] Essential hypertension, Headache, HTN (hypertension), Impaired mobility and activities of daily living, Lumbar stenosis with neurogenic claudication, Myelopathy ([address] and Osteoarthritis. has a past surgical history that includes joint replacement and thoracentesis (Left, 2021). Restrictions  Restrictions/Precautions  Restrictions/Precautions: Fall Risk  Subjective   General  Chart Reviewed: Yes  Patient assessed for rehabilitation services?: Yes  Family / Caregiver Present: No  Referring Practitioner: Dr. Judie Avilez  Diagnosis: Impaired mobility and ADLs d/t CHF exacerbation  Subjective  Subjective: \"Thank you girl\"    Patient reported no pain at the beginning and the end of the session. Orientation     Objective    ADL  Toileting: Stand by assistance        Toilet Transfers  Toilet - Technique: Ambulating  Equipment Used: Grab bars  Toilet Transfer: Minimal assistance  Toilet Transfers Comments: Patient used a ww to ambulate to the bathroom. Patient required more assist to walk to the bathroom this pm and appeared fatigued. Patient was also impulsive.  Patient needs the O2 line managed for her at all times      Patient completed reaching activities both reaching up to place items and reaching across the table to place cards into stacks but matching number. Patient indicates at times that her arms are tired by rolling her shoulders or rubbing her arms. Patient was then changed to AROM in gravity decreased position with towel on table exercises. Patient tolerated them well. Patient worked on fine motor coordination placing small pegs into the pegboard but had max difficulty with the task and the task was changed after she complete 4 rows based on her apparent frustration.          Plan   Plan  Times per week: 5-7x  Plan weeks: 5-7 days  Current Treatment Recommendations: Strengthening, Endurance Training, Neuromuscular Re-education, Self-Care / ADL, Balance Training, Functional Mobility Training, Cognitive Reorientation, Home Management Training, Safety Education & Training, Equipment Evaluation, Education, & procurement, Patient/Caregiver Education & Training  Plan Comment: Continue per OT POC for planned d/c on 2-11-21    Goals  Patient Goals   Patient goals : Not stated at this time       Therapy Time   Individual Concurrent Group Co-treatment   Time In 1500         Time Out 1600         Minutes 60              ADL training: 10 minutes  Therapeutic activities: 50 minutes    OSMANI Osborn/L    Electronically signed by SERAFIN Osbron on 2/5/2021 at 5:28 PM

## 2021-02-05 NOTE — PROGRESS NOTES
RN agrees w/LPN assessment. Pt is here R/T CHF and for rehab. Pt has been working w/therapy throughout the day. I have been working w/the LPN and monitoring the pt throughout the shift.  Electronically signed by John Em RN on 2/4/2021 at 8:38 PM

## 2021-02-06 LAB
ANION GAP SERPL CALCULATED.3IONS-SCNC: 7 MEQ/L (ref 9–15)
BUN BLDV-MCNC: 17 MG/DL (ref 8–23)
CALCIUM SERPL-MCNC: 8.4 MG/DL (ref 8.5–9.9)
CHLORIDE BLD-SCNC: 105 MEQ/L (ref 95–107)
CO2: 27 MEQ/L (ref 20–31)
CREAT SERPL-MCNC: 0.73 MG/DL (ref 0.5–0.9)
GFR AFRICAN AMERICAN: >60
GFR NON-AFRICAN AMERICAN: >60
GLUCOSE BLD-MCNC: 106 MG/DL (ref 70–99)
GLUCOSE BLD-MCNC: 113 MG/DL (ref 60–115)
GLUCOSE BLD-MCNC: 134 MG/DL (ref 60–115)
HCT VFR BLD CALC: 31.5 % (ref 37–47)
HEMOGLOBIN: 10.4 G/DL (ref 12–16)
MCH RBC QN AUTO: 29 PG (ref 27–31.3)
MCHC RBC AUTO-ENTMCNC: 33.2 % (ref 33–37)
MCV RBC AUTO: 87.3 FL (ref 82–100)
PDW BLD-RTO: 13.8 % (ref 11.5–14.5)
PERFORMED ON: ABNORMAL
PERFORMED ON: NORMAL
PLATELET # BLD: 247 K/UL (ref 130–400)
POTASSIUM SERPL-SCNC: 4.5 MEQ/L (ref 3.4–4.9)
RBC # BLD: 3.61 M/UL (ref 4.2–5.4)
SODIUM BLD-SCNC: 139 MEQ/L (ref 135–144)
WBC # BLD: 7.6 K/UL (ref 4.8–10.8)

## 2021-02-06 PROCEDURE — 6370000000 HC RX 637 (ALT 250 FOR IP): Performed by: INTERNAL MEDICINE

## 2021-02-06 PROCEDURE — 6370000000 HC RX 637 (ALT 250 FOR IP): Performed by: PHYSICIAN ASSISTANT

## 2021-02-06 PROCEDURE — 97116 GAIT TRAINING THERAPY: CPT

## 2021-02-06 PROCEDURE — 97110 THERAPEUTIC EXERCISES: CPT

## 2021-02-06 PROCEDURE — 97535 SELF CARE MNGMENT TRAINING: CPT

## 2021-02-06 PROCEDURE — 80048 BASIC METABOLIC PNL TOTAL CA: CPT

## 2021-02-06 PROCEDURE — 6370000000 HC RX 637 (ALT 250 FOR IP): Performed by: PHYSICAL MEDICINE & REHABILITATION

## 2021-02-06 PROCEDURE — 1180000000 HC REHAB R&B

## 2021-02-06 PROCEDURE — 36415 COLL VENOUS BLD VENIPUNCTURE: CPT

## 2021-02-06 PROCEDURE — 97530 THERAPEUTIC ACTIVITIES: CPT

## 2021-02-06 PROCEDURE — 85027 COMPLETE CBC AUTOMATED: CPT

## 2021-02-06 PROCEDURE — 94640 AIRWAY INHALATION TREATMENT: CPT

## 2021-02-06 PROCEDURE — 2700000000 HC OXYGEN THERAPY PER DAY

## 2021-02-06 PROCEDURE — 51798 US URINE CAPACITY MEASURE: CPT

## 2021-02-06 RX ORDER — PANTOPRAZOLE SODIUM 40 MG/1
40 TABLET, DELAYED RELEASE ORAL
Status: DISCONTINUED | OUTPATIENT
Start: 2021-02-07 | End: 2021-02-09 | Stop reason: HOSPADM

## 2021-02-06 RX ADMIN — POTASSIUM CHLORIDE 20 MEQ: 20 TABLET, EXTENDED RELEASE ORAL at 08:16

## 2021-02-06 RX ADMIN — CITALOPRAM HYDROBROMIDE 20 MG: 20 TABLET ORAL at 08:16

## 2021-02-06 RX ADMIN — BUDESONIDE AND FORMOTEROL FUMARATE DIHYDRATE 2 PUFF: 160; 4.5 AEROSOL RESPIRATORY (INHALATION) at 16:05

## 2021-02-06 RX ADMIN — CARVEDILOL 3.12 MG: 3.12 TABLET, FILM COATED ORAL at 20:45

## 2021-02-06 RX ADMIN — RIVAROXABAN 20 MG: 20 TABLET, FILM COATED ORAL at 17:03

## 2021-02-06 RX ADMIN — DILTIAZEM HYDROCHLORIDE 120 MG: 120 CAPSULE, COATED, EXTENDED RELEASE ORAL at 08:16

## 2021-02-06 RX ADMIN — ASPIRIN 81 MG: 81 TABLET, CHEWABLE ORAL at 08:16

## 2021-02-06 RX ADMIN — PANTOPRAZOLE SODIUM 40 MG: 40 TABLET, DELAYED RELEASE ORAL at 06:24

## 2021-02-06 RX ADMIN — CARVEDILOL 3.12 MG: 3.12 TABLET, FILM COATED ORAL at 08:16

## 2021-02-06 RX ADMIN — FUROSEMIDE 20 MG: 20 TABLET ORAL at 08:16

## 2021-02-06 RX ADMIN — LEVOTHYROXINE SODIUM 88 MCG: 88 TABLET ORAL at 06:24

## 2021-02-06 ASSESSMENT — PAIN SCALES - GENERAL
PAINLEVEL_OUTOF10: 0

## 2021-02-06 NOTE — PLAN OF CARE
Problem: Falls - Risk of:  Goal: Will remain free from falls  Description: Will remain free from falls  2/6/2021 0944 by Indigo Vega RN  Outcome: Ongoing  2/6/2021 0234 by Jett Tovar RN  Outcome: Ongoing  Goal: Absence of physical injury  Description: Absence of physical injury  2/6/2021 0944 by Indigo Vega RN  Outcome: Ongoing  2/6/2021 0234 by Jett Tovar RN  Outcome: Ongoing     Problem: Pain:  Goal: Pain level will decrease  Description: Pain level will decrease  2/6/2021 0944 by Indigo Vega RN  Outcome: Ongoing  2/6/2021 0234 by Jett Tovar RN  Outcome: Ongoing  Goal: Control of acute pain  Description: Control of acute pain  2/6/2021 0944 by Indigo Vega RN  Outcome: Ongoing  2/6/2021 0234 by Jett Tovar RN  Outcome: Ongoing  Goal: Control of chronic pain  Description: Control of chronic pain  2/6/2021 0944 by Indigo Vega RN  Outcome: Ongoing  2/6/2021 0234 by Jett Tovar RN  Outcome: Ongoing

## 2021-02-06 NOTE — PROGRESS NOTES
I took patient to the restroom. Patient transferred well using her walker. I had to encourage her several times to use the call light. Patient sitting at bedside, bed alarm is on, call light is within reach. Patient eating breakfast. Pt Hep lock in right arm flushed and patent and reinforced for comfort. IV to be changed, if not D/C on 2/8/21. Patient denies pain at this time. Will monitor PVR today. Patient bowel sounds active and abdomen is soft and not tender. Will continue to monitor.

## 2021-02-06 NOTE — PROGRESS NOTES
Physical Therapy Rehab Treatment Note  [facility]       NAME: Mana Grajeda  : 1932 (80 y.o.)  MRN: [id number]  CODE STATUS: Full Code    Date of Service: 2021  Chart Reviewed: Yes  Family / Caregiver Present: No    Restrictions:  Restrictions/Precautions: Fall Risk       SUBJECTIVE: Subjective: Pt agreable to tx  Pain Screening  Patient Currently in Pain: Denies  Pre Treatment Pain Screening  Pain at present: 0  Intervention List: Patient able to continue with treatment    Post Treatment Pain Screening:  Pain Assessment  Pain Level: 0    OBJECTIVE:     Neuromuscular Education  Neuromuscular Comments: Static standing balance without UE support, fwd bending, no LOB    Transfers  Sit to Stand: Stand by assistance  Stand to sit: Contact guard assistance  Bed to Chair: Contact guard assistance  Stand Pivot Transfers: Contact guard assistance  Comment: good technique throughout trsfs as well as safety. Pt able to maintain balance and perform most of self care during toilet trsf. Ambulation  Ambulation?: Yes  Ambulation 1  Surface: carpet  Device: Rolling Walker  Other Apparatus: O2  Assistance: Contact guard assistance  Quality of Gait: decreased clayton heel strike, shortened step length, slow lea  Gait Deviations: Slow Lea; Increased JAMES  Distance: 20' x 3  Comments: distance not focus    Stairs/Curb  Stairs?: Yes  Stairs  # Steps : 8  Stairs Height: 6\"  Rails: Bilateral  Assistance: Contact guard assistance  Comment: non reciprocal pattern, SOB post stair training, SpO2 93%    ASSESSMENT/COMMENTS:  Assessment: Pt exhibits an improvement in strength and endurance secondary to stair training this session. Pt displays good safety throughout all trsfs during session. Self care in bathroom utilized to help balance training, vc's to improve technique and safety with tasks. Good follow through of vc's with visual demonstration.     PLAN OF CARE/Safety:   Plan Comment: Cont. POC  Safety Devices  Type of devices: All fall risk precautions in place;Call light within reach; Chair alarm in place; Left in chair      Therapy Time:   Individual   Time In 1300   Time Out 1330   Minutes 30     Minutes:      Transfer/Bed mobility training: 10      Gait training: 15      Neuro re education: 5     Therapeutic ex: 0      Casey Hanna PTA, 02/06/21 at 4:39 PM

## 2021-02-06 NOTE — PROGRESS NOTES
Occupational Therapy  Facility/Department: [facility]  Daily Treatment Note  NAME: Sanjuana Lockwood  : 1932  MRN: [id number]    Date of Service: 2021    Discharge Recommendations:  Continue to assess pending progress       Assessment      REQUIRES OT FOLLOW UP: Yes  Activity Tolerance  Activity Tolerance: Patient Tolerated treatment well  Safety Devices  Safety Devices in place: Yes  Type of devices: All fall risk precautions in place         Patient Diagnosis(es): There were no encounter diagnoses. has a past medical history of Ascending aortic aneurysm ([address] Charcot Arleen Tooth muscular atrophy, Depression, Descending aortic aneurysm ([address] Essential hypertension, Headache, HTN (hypertension), Impaired mobility and activities of daily living, Lumbar stenosis with neurogenic claudication, Myelopathy ([address] and Osteoarthritis. has a past surgical history that includes joint replacement and thoracentesis (Left, 2021). Restrictions  Restrictions/Precautions  Restrictions/Precautions: Fall Risk  Subjective   General  Chart Reviewed: Yes  Patient assessed for rehabilitation services?: Yes  Family / Caregiver Present: No  Referring Practitioner: Dr. Henri Childers  Diagnosis: Impaired mobility and ADLs d/t CHF exacerbation  Subjective  Subjective: \"I want to shower\"  Pain Assessment  Pain Assessment: 0-10  Pain Level: 0  Pre Treatment Pain Screening  Pain at present: 0  Scale Used: Numeric Score  Intervention List: Patient able to continue with treatment  Vital Signs  Patient Currently in Pain: Denies   Orientation     Objective  Pt completed folding laundry task at tabletop, BUE reaching and retrieving items to match, pt required occ cues locating a pair of items, completed core strengthening and rotation L<>R, modified sit ups, vcs for initiation, tech, and fair carryover. Tasks completed in order to promote increased activity tolerance and ease in reaching for item retrieval during ADL tasks. Pt had min to mod difficulty completing task.       Plan   Plan  Times per week: 5-7x  Plan weeks: 5-7 days  Current Treatment Recommendations: Strengthening, Endurance Training, Neuromuscular Re-education, Self-Care / ADL, Balance Training, Functional Mobility Training, Cognitive Reorientation, Home Management Training, Safety Education & Training, Equipment Evaluation, Education, & procurement, Patient/Caregiver Education & Training  Plan Comment: Continue per OT POC    Goals  Patient Goals   Patient goals : Not stated at this time       Therapy Time   Individual Concurrent Group Co-treatment   Time In 1330         Time Out 1400         Minutes 30              Therapeutic activities: 30 minutes      PRUDENCE Aaron Electronically signed by PRUDENCE Aaron on 2/6/2021 at 3:05 PM

## 2021-02-06 NOTE — PROGRESS NOTES
Occupational Therapy  Facility/Department: [facility]  Daily Treatment Note  NAME: Saravanan Gottlieb  : 1932  MRN: [id number]    Date of Service: 2021    Discharge Recommendations:  Continue to assess pending progress  OT Equipment Recommendations  ADL Assistive Devices: Reacher;Sock-Aid Hard;Long-handled Sponge;Long-handled Shoe Horn    Assessment      REQUIRES OT FOLLOW UP: Yes         Patient Diagnosis(es): There were no encounter diagnoses. has a past medical history of Ascending aortic aneurysm ([address] Charcot Arleen Tooth muscular atrophy, Depression, Descending aortic aneurysm ([address] Essential hypertension, Headache, HTN (hypertension), Impaired mobility and activities of daily living, Lumbar stenosis with neurogenic claudication, Myelopathy ([address] and Osteoarthritis. has a past surgical history that includes joint replacement and thoracentesis (Left, 2021).     Restrictions  Restrictions/Precautions  Restrictions/Precautions: Fall Risk  Subjective   General  Chart Reviewed: Yes  Patient assessed for rehabilitation services?: Yes  Family / Caregiver Present: No  Referring Practitioner: Dr. Raymon Aparicio  Diagnosis: Impaired mobility and ADLs d/t CHF exacerbation  Subjective  Subjective: \"I want to shower\"  Pre Treatment Pain Screening  Pain at present: 0  Scale Used: Numeric Score  Intervention List: Patient able to continue with treatment  Vital Signs  Patient Currently in Pain: Denies   Orientation  Orientation  Overall Orientation Status: Within Functional Limits  Objective    ADL  Grooming: Setup  UE Bathing: Minimal assistance(Min A to wash lower back)  LE Bathing: Minimal assistance  UE Dressing: Setup  LE Dressing: Minimal assistance  Toileting: Stand by assistance  Additional Comments: Pt took shower, completed dressing, and grooming seated in chair     Balance  Sitting Balance: Supervision  Standing Balance: Contact guard assistance  Functional Mobility  Functional - Mobility Device: Rolling Walker  Activity: To/from bathroom  Assist Level: Contact guard assistance  Toilet Transfers  Toilet - Technique: Ambulating  Equipment Used: Grab bars  Toilet Transfer: Contact guard assistance  Toilet Transfers Comments: Patient used a ww to ambulate to the bathroom. Shower Transfers  Shower - Transfer From: Didi Newell - Transfer Type: To and From  Shower - Transfer To: Shower seat with back  Shower - Technique: Ambulating  Shower Transfers: Contact Guard     Transfers  Sit to stand: Contact guard assistance  Stand to sit: Contact guard assistance     Cognition  Overall Cognitive Status: Exceptions  Arousal/Alertness: Appropriate responses to stimuli  Following Commands: Follows one step commands consistently  Attention Span: Appears intact  Safety Judgement: Decreased awareness of need for safety  Problem Solving: Assistance required to generate solutions;Assistance required to implement solutions;Assistance required to identify errors made;Assistance required to correct errors made  Insights: Decreased awareness of deficits  Initiation: Requires cues for all  Sequencing: Requires cues for some  Cognition Comment: Comp: Supervision, Express: Independent, Social: Independent, Prob: Min A, Mem: N/A        Patient requested shower this date. After ADL tasks were completed, patient was transported to therapy gym and completed cognitive re-training sorting/sequencing activity using playing cards. Patient first sorted cards by suit - required mod vc's for initial instruction and incorrectly sorted 13/56 cards. Patient then sorted cards by # - Pt required mod vc's for initial instruction and min-mod vc's throughout for sequencing accuracy. Patient sequenced 5/40 cards incorrectly. Patient demo'd min-mod difficulty  cards with decreased awareness of multiple cards sticking together.        Plan   Plan  Times per week: 5-7x  Plan weeks: 5-7 days  Current Treatment Recommendations: Strengthening, Endurance Training, Neuromuscular Re-education, Self-Care / ADL, Balance Training, Functional Mobility Training, Cognitive Reorientation, Home Management Training, Safety Education & Training, Equipment Evaluation, Education, & procurement, Patient/Caregiver Education & Training  Plan Comment: Continue per OT POC          Goals  Patient Goals   Patient goals : Not stated at this time       Therapy Time   Individual Concurrent Group Co-treatment   Time In 0900         Time Out 1000         Minutes 60           ADL trainin minutes  Therapeutic activities: 15 minutes    Julieth Orellana OT   Electronically signed by Julieth Orellana OT on 2021 at 11:21 AM

## 2021-02-06 NOTE — PROGRESS NOTES
Physical Therapy Rehab Treatment Note  [facility]       NAME: Arnel Nicole  : 1932 (80 y.o.)  MRN: [id number]  CODE STATUS: Full Code    Date of Service: 2021  Chart Reviewed: Yes    Restrictions:  Restrictions/Precautions: Fall Risk       SUBJECTIVE: Subjective: Pt stated she ahs no pain  Pain Screening  Patient Currently in Pain: Denies       Post Treatment Pain Screenin       OBJECTIVE:                    Bed mobility  Supine to Sit: Stand by assistance  Sit to Supine: Stand by assistance    Transfers  Sit to Stand: Stand by assistance  Stand to sit: Stand by assistance; Moderate Assistance(ModA required post ambulation when pt LOB)    Ambulation  Ambulation?: Yes  Ambulation 1  Surface: carpet  Device: Rolling Walker  Other Apparatus: O2(2L)  Assistance: Contact guard assistance  Quality of Gait: deviated path slow and steady  Gait Deviations: Slow Lea; Increased JAMES  Distance: 100 feet  Comments: SPO2 94% post gait. Pt with LOB when sitting post ambualtion requiring assistance to sit down. Exercises  Straight Leg Raise: x10  Hip Abduction: x15 with RTB ; add x15  Knee Long Arc Quad: x15  Physio/Swiss Ball: seated overhead ball flexion and fwd flexion. x 15  Comments: standing therex at ww marches x 10. CGA; squats x 15  Other exercises  Other exercises?: Yes  Other exercises 1: Manual HS stretch x20sec b/l  Other exercises 2: Bent knee fall outs x10 supine  Other exercises 3: knee and hip flexion on physioball x15  Other exercises 4: seated toe taps on 4-in box x10     ASSESSMENT/COMMENTS:  Assessment: Pt becomes very fatigued throughout tx requiring RB's thorughout tx. atifue results in unsteadiness during transfers with pt requiring tari assiatance. Pt with laboured breathing noted. Pt with c/o dizziness when coming into sitting from supine, instructed pt in pursed lip breathing technique.     PLAN OF CARE/Safety:   Safety Devices  Type of devices: All fall risk precautions in place;Call light within reach; Chair alarm in place; Left in chair      Therapy Time:   Individual   Time In 1000   Time Out 1100   Minutes 60     Minutes:      Transfer/Bed mobility trainin      Gait training:15      Neuro re education:0     Therapeutic ex:40      Ana Dodd PTA, 21 at 11:01 AM

## 2021-02-06 NOTE — PROGRESS NOTES
Talked with Dr. Jesus Resendez about patient PVR after taylor was taken out yesterday. Last PVR was 370, and we are to keep encouraging fluids and encouraging patient to void. Will continue to monitor. Breath sounds clear and equal bilaterally. Respirations non labored, no retractions.

## 2021-02-06 NOTE — PROGRESS NOTES
Therapy brought patient back to the room once therapy was finished. Therapist stated that patient complained of chest pain when doing therapy. Patient is now resting in room and denies pain at this time. This similar situation happened yesterday during therapy as well. Chest pain was also relieved by rest. Patient sitting in chair. Chair alarm is on, call light is within reach. Will continue to closely monitor.

## 2021-02-06 NOTE — PROGRESS NOTES
Assumed care for this patient at 1130. Pt has been sleeping soundly. Taken to the bathroom 3:30am  Up with a walker. Pt was SOB on the return  To her bed. Pt has 2L of O2 NC. An does not use oxygen at home. Pt is continent of bowel and bladder. A trial Taylor removal was done today we need to obtain 2 PVRS. (Machine signed off the unit currently.) Pt is on ASA and Plavix for blood thinners. Pt has a HPLK in Right hand F+P an capped. Call light within reach bed alarm on monitoring for safety and pt needs. Pt has Labs on WED, BMP & CBC. Pt HPLK  re enforced. Its due to be changed out 2/8. Pt was PVR early this am @ 497. Since the patient has been to the bathroom x3. Passed on in report  nurse that we need to get another PVR an also need to have taylor cath kits sent up  To the floor in case she needs a re insertion.

## 2021-02-06 NOTE — PROGRESS NOTES
Subjective: The patient complains of ,\" moderate acute on chronic progressive fatigue and    partially relieved by rest,medications, Pt, OT, and rest and exacerbated by recent illness. I am concerned about patients medical complexities. Pleurel effusions tapped. ROS x10: The patient also complains of severely impaired mobility and activities of daily living. Otherwise no new problems with vision, hearing, nose, mouth, throat, dermal, cardiovascular, GI, , pulmonary, musculoskeletal, psychiatric or neurological. See Rehab H&P on Rehab chart dated . Vital signs:  BP 98/62   Pulse 84   Temp 98 °F (36.7 °C) (Oral)   Resp 18   Ht 5' 3\" (1.6 m)   Wt 155 lb 8 oz (70.5 kg)   SpO2 92%   BMI 27.55 kg/m²     Vitals:    02/05/21 1829   BP: 98/62   Pulse: 84   Resp: 18   Temp: 98 °F (36.7 °C)   SpO2: 92%       I/O:   PO/Intake:  fair PO intake, no problems observed or reported. Bowel/Bladder:  continent, no problems noted. General:  Patient is well developed, adequately nourished, non-obese and     well kempt. HEENT:    PERRLA, hearing intact to loud voice, external inspection of ear     and nose benign. Inspection of lips, tongue and gums benign  Musculoskeletal: No significant change in strength or tone. All joints stable. Inspection and palpation of digits and nails show no clubbing,       cyanosis or inflammatory conditions. Neuro/Psychiatric: Affect: flat but pleasant. Alert and oriented to person, place and     Situation with    cues. No significant change in deep tendon reflexes or     sensation  Lungs:  Diminished, CTA-B. Respiration effort is normal at rest.     Heart:   S1 = S2, RRR. No loud murmurs. Abdomen:  Soft, non-tender, no enlargement of liver or spleen. Extremities:  No significant lower extremity edema or tenderness. Skin:   Intact to general survey, no visualized or palpated problems.     Rehabilitation:  Physical therapy:   Bed Mobility: Transfers: Sit to Stand: Stand by assistance  Stand to sit: Stand by assistance, Ambulation 1  Surface: carpet  Device: Rolling Walker  Other Apparatus: O2  Assistance: Contact guard assistance  Quality of Gait: mild trunk flexion, slow and steady, steppage gt to clear floor  Gait Deviations: Slow Lea, Increased JAMES  Distance: 80 feet with turns  Comments: 96 post gt. called RN Jomar Montes to let her know pt experiences chest pains with gt. Jomar Montes arrived to check bp., Stairs  # Steps : 4  Stairs Height: 6\"  Rails: Bilateral  Device: No Device  Assistance: Contact guard assistance, Minimal assistance  Comment: pt alternates between reciprocal and non-reciprocal    FIMS:  ,  , Assessment: Pt reports dizziness and experieinced LOB in standing. Unable to continue with full therapy session. Pt is demonstrating deficits as listed. She is requiring assistance for mobility at this time. Pt would benefit from continued PT prior to safe return to home    Occupational therapy:    ,  , Assessment: Pt is an 81 y/o female from home alone who presents to [address] [place] [address] with the above deficits which impact her independence and safety during ADLs. Pt would benefit from OT services to maximize independence and safety during ADLs.     Speech therapy:        Lab/X-ray studies reviewed, analyzed and discussed with patient and staff:   Recent Results (from the past 24 hour(s))   Basic Metabolic Panel    Collection Time: 02/05/21  5:25 AM   Result Value Ref Range    Sodium 140 135 - 144 mEq/L    Potassium 4.2 3.4 - 4.9 mEq/L    Chloride 104 95 - 107 mEq/L    CO2 27 20 - 31 mEq/L    Anion Gap 9 9 - 15 mEq/L    Glucose 108 (H) 70 - 99 mg/dL    BUN 17 8 - 23 mg/dL    CREATININE 0.72 0.50 - 0.90 mg/dL    GFR Non-African American >60.0 >60    GFR  >60.0 >60    Calcium 8.5 8.5 - 9.9 mg/dL   CBC    Collection Time: 02/05/21  5:25 AM   Result Value Ref Range    WBC 9.4 4.8 - 10.8 K/uL    RBC 3.75 (L) 4.20 - 5.40 M/uL    Hemoglobin 11.0 (L) 12.0 - 16.0 g/dL    Hematocrit 32.9 (L) 37.0 - 47.0 %    MCV 87.7 82.0 - 100.0 fL    MCH 29.4 27.0 - 31.3 pg    MCHC 33.6 33.0 - 37.0 %    RDW 13.9 11.5 - 14.5 %    Platelets 046 609 - 780 K/uL       Echo Complete 2d W Doppler W Color    Result Date: 1/27/2021  Transthoracic Echocardiography Report (TTE)  Demographics  Patient Name   Sim Singh        Gender              Female                 Segundo Torres  Patient Number [id number]          Race                                                   Ethnicity  Visit Number   [id number]         Room Number         C756  Corporate ID                     Date of Study       01/27/2021  Accession      [id number]        Referring Physician  Number  Date of Birth  07/12/1932        Sonographer         Nisa STRONG  Age            80 year(s)        Interpreting        [facility])                                   Physician           Cardiology                                                       Amy Anders MD Procedure Type of Study  TTE procedure:ECHO COMPLETE 2D W/DOP W/COLOR. Procedure Date Date: 01/27/2021 Start: 10:26 AM Study Location: Portable Technical Quality: Adequate visualization Indications:Congestive heart failure. Patient Status: Routine Height: 64 inches Weight: 165 pounds BSA: 1.8 m^2 BMI: 28.32 kg/m^2 BP: 108/77 mmHg  Conclusions  Summary  Mitral annular calcification is present. 1+ MR  Left ventricular ejection fraction is visually estimated at 40%. E/A flow reversal noted. Suggestive of diastolic dysfunction. Signature  ----------------------------------------------------------------  Electronically signed by Amy Anders MD(Interpreting  physician) on 01/27/2021 11:37 AM  ----------------------------------------------------------------  Findings Left Ventricle Left ventricular ejection fraction is visually estimated at 40%. E/A flow reversal noted. Suggestive of diastolic dysfunction.  Right Ventricle Normal right ventricle structure and function. Normal right ventricle systolic pressure. Left Atrium Moderately dilated left atrium. Right Atrium Moderately enlarged right atrium size. Mitral Valve Mitral annular calcification is present. 1+ MR Tricuspid Valve Normal tricuspid valve structure and function. 1-2+ TR RVSP 38 mmHg Aortic Valve Aortic valve leaflets are moderately thickened. No AS No AR Pulmonic Valve The pulmonic valve was not well visualized . Pericardial Effusion No evidence of pericardial effusion. Pleural Effusion No evidence of pleural effusion. Aorta \ Miscellaneous The aorta is within normal limits. M-Mode Measurements (cm)  LVIDd: 4.39 cm                         LVIDs: 3.55 cm  IVSd: 1.43 cm                          IVSs: 1.55 cm  LVPWd: 1.5 cm                          LVPWs: 1.48 cm  Rt. Vent.  Dimension: 3.74 cm           AO Root Dimension: 3.62 cm                                         ACS: 1.25 cm                                         LA: 4.54 cm                                         LVOT: 2.04 cm Doppler Measurements:  AV Velocity:0.02 m/s                    MV Peak E-Wave: 0.85 m/s  AV Peak Gradient: 10.44 mmHg            MV Peak A-Wave: 0.87 m/s  AV Mean Gradient: 6.01 mmHg  AV Area (Continuity):1.83 cm^2  TR Velocity:2.62 m/s                    Estimated RAP:10 mmHg  TR Gradient:27.54 mmHg                  RVSP:37.54 mmHg Valves  Mitral Valve  Peak E-Wave: 0.85 m/s                 Peak A-Wave: 0.87 m/s                                        E/A Ratio: 0.97                                        Peak Gradient: 2.88 mmHg                                        Deceleration Time: 232.8 msec  Tissue Doppler  E' Septal Velocity: 0.07 m/s  E' Lateral Velocity: 0.08 m/s  Aortic Valve  Peak Velocity: 1.62 m/s                Mean Velocity: 1.15 m/s  Peak Gradient: 10.44 mmHg              Mean Gradient: 6.01 mmHg  Area (continuity): 1.83 cm^2           Area (2D): 1.8 cm^2  AV VTI: 28.8 cm  Cusp Separation: 1.25 cm  Tricuspid Valve Estimated RVSP: 37.54 mmHg              Estimated RAP: 10 mmHg  TR Velocity: 2.62 m/s                   TR Gradient: 27.54 mmHg  Pulmonic Valve  Peak Velocity: 0.96 m/s           Peak Gradient: 3.66 mmHg                                    Estimated PASP: 37.54 mmHg  LVOT  Peak Velocity: 0.89 m/s              Mean Velocity: 0.57 m/s  Peak Gradient: 3.16 mmHg             Mean Gradient: 1.58 mmHg  LVOT Diameter: 2.04 cm               LVOT VTI: 16.15 cm Structures  Left Atrium  LA Dimension: 4.54 cm                        LA Area: 11.81 cm^2  LA/Aorta: 1.25  LA Volume/Index: 44.64 ml /25 m^2  Left Ventricle  Diastolic Dimension: 1.92 cm         Systolic Dimension: 3.21 cm  Septum Diastolic: 2.60 cm            Septum Systolic: 4.20 cm  PW Diastolic: 1.5 cm                 PW Systolic: 5.64 cm                                       FS: 19.1 %  LV EDV/LV EDV Index: 87.32 ml/49 m^2 LV ESV/LV ESV Index: 52.68 ml/29 m^2  EF Calculated: 39.7 %                LV Length: 6.12 cm  LVOT Diameter: 2.04 cm  Right Atrium  RA Systolic Pressure: 10 mmHg  Right Ventricle  Diastolic Dimension: 2.19 cm                                    RV Systolic Pressure: 13.64 mmHg Aorta/ Miscellaneous Aorta  Aortic Root: 3.62 cm  LVOT Diameter: 2.04 cm    Cta Chest W Wo Contrast    Result Date: 1/26/2021  EXAMINATION:  CHEST CTA WITH CONTRAST (PULMONARY EMBOLISM PROTOCOL) CLINICAL HISTORY:   PE   I50.43 Acute on chronic combined systolic and diastolic CHF (congestive heart failure) (HCC) ICD10. Technique:  Spiral axial CT acquisition of the chest from the thoracic inlet to the upper abdomen following IV contrast.  2-D images were reconstructed in the sagittal and coronal planes. 3-D MIPS images were generated in the coronal and axial planes. Images were reviewed on the PACS workstation. All images including the 3-D MIPS were personally archived.  Contrast:  IV administration of 100 ml Isovue 300 All CT scans at this facility use dose modulation, iterative reconstruction, and/or weight based dosing when appropriate to reduce radiation dose to as low as reasonably achievable. Comparison:  CTA chest 3/15/2019  RESULT: Limitations: Motion artifact. Evaluation for thromboembolic disease:      - Right heart chambers:  No thromboembolic disease.      - Main pulmonary arteries:  No thromboembolic disease.      - Lobar pulmonary arteries:  No thromboembolic disease.        - Segmental pulmonary arteries:  No thromboembolic disease.        - Subsegmental pulmonary arteries:  Not well visualized due to motion. Lines, tubes, and devices:  None. Lung parenchyma and pleura: Tortuous course of the trachea. Central airways appear grossly patent. Moderate left and small right pleural effusions with associated atelectasis. Limitations in evaluation of the lung parenchyma secondary to motion. Other areas of probable scarring/atelectasis. No pneumothorax. Thoracic inlet, heart, and mediastinum: Visualized thyroid unremarkable. No axillary, mediastinal, or hilar lymphadenopathy. Ascending thoracic aorta aneurysmal, measuring around 5.0 cm, similar to prior. Descending thoracic aorta aneurysmal and measures  up to 4.8 cm, also similar to prior. Normal pulmonary artery size. Cardiomegaly, similar to prior Scattered coronary artery calcifications. Small pericardial effusion. Small hiatal hernia. Bones and soft tissues:  No destructive bone lesion or acute osseous findings. Osteopenia. Scoliosis and kyphosis and multilevel degenerative changes, grossly similar to prior. Chest wall unremarkable. Upper abdomen:  No acute abnormality in the imaged upper abdomen. Reflux of contrast into the hepatic veins, associated with right heart failure. Lower neck: Unremarkable. No CT evidence of pulmonary embolism. Moderate left and small right pleural effusions. Cardiomegaly and small pericardial effusion. Reflux of contrast into the hepatic veins, associated with right heart failure. 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 No lung consolidative opacity. Areas of probable atelectasis/scarring. Xr Chest Portable    Result Date: 1/26/2021  EXAMINATION: Portable AP ERECT view of the chest. CLINICAL HISTORY:  SOB , Negative Covid-19 test. DATE: 1/25/2021 5:41 PM COMPARISONS: 7/24/2017 FINDINGS: The heart is moderately enlarged, similar to prior exam.  Prominent interstitial markings, consistent with increasing Central vascular congestion. The costophrenic angles are blunted secondary to pleural effusions bilaterally, left greater than  right. No pneumothorax. There are infiltrates/atelectasis within lung bases, left greater than right. There are moderate degenerative changes in spine. CARDIAC ENLARGEMENT WITH CENTRAL VESSEL CONGESTION AND BILATERAL PLEURAL EFFUSIONS, POSSIBLE CONGESTIVE HEART FAILURE. BIBASILAR INFILTRATES/ATELECTASIS, LEFT GREATER THAN RIGHT. Previous extensive, complex labs, notes and diagnostics reviewed and analyzed. ALLERGIES:    Allergies as of 02/02/2021 - Review Complete 02/02/2021   Allergen Reaction Noted    Latex  07/19/2017    Tape [adhesive tape]  06/28/2016      (please also verify by checking [facility])     Complex Physical Medicine & Rehab Issues Assess & Plan:   1. Severe abnormality of gait and mobility and impaired self-care and ADL's secondary to progressive Cardiac rehab CHF exacerbation . Functional and medical status reassessed regarding patients ability to participate in therapies and patient found to be able to participate in acute intensive comprehensive inpatient rehabilitation program including PT/OT to improve balance, ambulation, ADLs, and to improve the P/AROM. Therapeutic modifications regarding activities in therapies, place, amount of time per day and intensity of therapy made daily.   In bed therapies or bedside therapies prn.   2. Bowel and Bladder dysfunction  :  frequent toileting, ambulate to bathroom with assistance, check post void residuals. Check for C.difficile x1 if >2 loose stools in 24 hours, continue bowel & bladder program.  Monitor bowel and bladder function. Lactinex 2 PO every AC. MOM prn, Brown Bomb prn, Glycerin suppository prn, enema prn. 3. Moderate   pain as well as generalized OA pain: reassess pain every shift and prior to and after each therapy session, give prn Tylenol and   , modalities prn in therapy, masage, Lidoderm, K-pad prn. Consider scheduled AM pain meds. 4. Skin healing and breakdown risk:  continue pressure relief program.  Daily skin exams and reports from nursing. 5. Severe fatigue due to nutritional and hydration deficiency: Add and titrate vitamin B12 vitamin D and CoQ10 continue to monitor I&Os, calorie counts prn, dietary consult prn.  6. Acute episodic insomnia with situational adjustment disorder:  prn Ambien, monitor for day time sedation. 7. Falls risk elevated:  patient to use call light to get nursing assistance to get up, bed and chair alarm. 8. Elevated DVT risk: progressive activities in PT, continue prophylaxis PORTILLO hose, elevation and  see mar . 9. Complex discharge planning:  Weekly team meeting every Monday Thursday to assess progress towards goals, discuss and address social, psychological and medical comorbidities and to address difficulties they may be having progressing in therapy. Patient and family education is in progress. The patient is to follow-up with their family physician after discharge.         1. Complex Active General Medical Issues that complicate care Assess & Plan:     Patient Active Problem List   Diagnosis    Lumbar stenosis with neurogenic claudication    Acquired scoliosis    Osteoporosis    Charcot Arleen Tooth muscular atrophy    Excess weight    Osteoarthritis of lumbar spine with myelopathy    Osteoarthritis    Myelopathy (HCC)    Impaired mobility and

## 2021-02-07 ENCOUNTER — APPOINTMENT (OUTPATIENT)
Dept: GENERAL RADIOLOGY | Age: 86
DRG: 091 | End: 2021-02-07
Attending: PHYSICAL MEDICINE & REHABILITATION
Payer: MEDICARE

## 2021-02-07 LAB
ANION GAP SERPL CALCULATED.3IONS-SCNC: 11 MEQ/L (ref 9–15)
BACTERIA: NEGATIVE /HPF
BILIRUBIN URINE: NEGATIVE
BLOOD, URINE: ABNORMAL
BUN BLDV-MCNC: 17 MG/DL (ref 8–23)
CALCIUM SERPL-MCNC: 8.4 MG/DL (ref 8.5–9.9)
CHLORIDE BLD-SCNC: 104 MEQ/L (ref 95–107)
CLARITY: CLEAR
CO2: 24 MEQ/L (ref 20–31)
COLOR: YELLOW
CREAT SERPL-MCNC: 0.61 MG/DL (ref 0.5–0.9)
EPITHELIAL CELLS, UA: NORMAL /HPF (ref 0–5)
GFR AFRICAN AMERICAN: >60
GFR NON-AFRICAN AMERICAN: >60
GLUCOSE BLD-MCNC: 97 MG/DL (ref 70–99)
GLUCOSE URINE: NEGATIVE MG/DL
HCT VFR BLD CALC: 30.5 % (ref 37–47)
HEMOGLOBIN: 10.1 G/DL (ref 12–16)
HYALINE CASTS: NORMAL /HPF (ref 0–5)
KETONES, URINE: NEGATIVE MG/DL
LEUKOCYTE ESTERASE, URINE: ABNORMAL
MCH RBC QN AUTO: 29.1 PG (ref 27–31.3)
MCHC RBC AUTO-ENTMCNC: 33 % (ref 33–37)
MCV RBC AUTO: 88.1 FL (ref 82–100)
NITRITE, URINE: NEGATIVE
PDW BLD-RTO: 14 % (ref 11.5–14.5)
PH UA: 5 (ref 5–9)
PLATELET # BLD: 249 K/UL (ref 130–400)
POTASSIUM SERPL-SCNC: 3.9 MEQ/L (ref 3.4–4.9)
PROTEIN UA: NEGATIVE MG/DL
RBC # BLD: 3.47 M/UL (ref 4.2–5.4)
RBC UA: NORMAL /HPF (ref 0–5)
SODIUM BLD-SCNC: 139 MEQ/L (ref 135–144)
SPECIFIC GRAVITY UA: 1.02 (ref 1–1.03)
UROBILINOGEN, URINE: 0.2 E.U./DL
WBC # BLD: 7.4 K/UL (ref 4.8–10.8)
WBC UA: NORMAL /HPF (ref 0–5)

## 2021-02-07 PROCEDURE — 97112 NEUROMUSCULAR REEDUCATION: CPT

## 2021-02-07 PROCEDURE — 81001 URINALYSIS AUTO W/SCOPE: CPT

## 2021-02-07 PROCEDURE — 51798 US URINE CAPACITY MEASURE: CPT

## 2021-02-07 PROCEDURE — 1180000000 HC REHAB R&B

## 2021-02-07 PROCEDURE — 94761 N-INVAS EAR/PLS OXIMETRY MLT: CPT

## 2021-02-07 PROCEDURE — 6370000000 HC RX 637 (ALT 250 FOR IP): Performed by: PHYSICAL MEDICINE & REHABILITATION

## 2021-02-07 PROCEDURE — 87186 SC STD MICRODIL/AGAR DIL: CPT

## 2021-02-07 PROCEDURE — 6370000000 HC RX 637 (ALT 250 FOR IP)

## 2021-02-07 PROCEDURE — 6360000002 HC RX W HCPCS

## 2021-02-07 PROCEDURE — 71045 X-RAY EXAM CHEST 1 VIEW: CPT

## 2021-02-07 PROCEDURE — 87086 URINE CULTURE/COLONY COUNT: CPT

## 2021-02-07 PROCEDURE — 2700000000 HC OXYGEN THERAPY PER DAY

## 2021-02-07 PROCEDURE — 6370000000 HC RX 637 (ALT 250 FOR IP): Performed by: PHYSICIAN ASSISTANT

## 2021-02-07 PROCEDURE — 6370000000 HC RX 637 (ALT 250 FOR IP): Performed by: INTERNAL MEDICINE

## 2021-02-07 PROCEDURE — 97116 GAIT TRAINING THERAPY: CPT

## 2021-02-07 PROCEDURE — 94640 AIRWAY INHALATION TREATMENT: CPT

## 2021-02-07 PROCEDURE — 36415 COLL VENOUS BLD VENIPUNCTURE: CPT

## 2021-02-07 PROCEDURE — 85027 COMPLETE CBC AUTOMATED: CPT

## 2021-02-07 PROCEDURE — 80048 BASIC METABOLIC PNL TOTAL CA: CPT

## 2021-02-07 PROCEDURE — 87077 CULTURE AEROBIC IDENTIFY: CPT

## 2021-02-07 PROCEDURE — 97110 THERAPEUTIC EXERCISES: CPT

## 2021-02-07 RX ORDER — FUROSEMIDE 10 MG/ML
INJECTION INTRAMUSCULAR; INTRAVENOUS
Status: COMPLETED
Start: 2021-02-07 | End: 2021-02-07

## 2021-02-07 RX ORDER — FUROSEMIDE 10 MG/ML
20 INJECTION INTRAMUSCULAR; INTRAVENOUS ONCE
Status: COMPLETED | OUTPATIENT
Start: 2021-02-07 | End: 2021-02-07

## 2021-02-07 RX ORDER — NITROGLYCERIN 0.4 MG/1
0.4 TABLET SUBLINGUAL EVERY 5 MIN PRN
Status: DISCONTINUED | OUTPATIENT
Start: 2021-02-07 | End: 2021-02-09 | Stop reason: HOSPADM

## 2021-02-07 RX ORDER — POTASSIUM CHLORIDE 20 MEQ/1
20 TABLET, EXTENDED RELEASE ORAL ONCE
Status: COMPLETED | OUTPATIENT
Start: 2021-02-07 | End: 2021-02-07

## 2021-02-07 RX ORDER — FUROSEMIDE 20 MG/1
20 TABLET ORAL DAILY
Status: DISCONTINUED | OUTPATIENT
Start: 2021-02-07 | End: 2021-02-08 | Stop reason: SDUPTHER

## 2021-02-07 RX ORDER — PANTOPRAZOLE SODIUM 40 MG/1
40 TABLET, DELAYED RELEASE ORAL
Status: DISCONTINUED | OUTPATIENT
Start: 2021-02-07 | End: 2021-02-07 | Stop reason: DRUGHIGH

## 2021-02-07 RX ORDER — NITROGLYCERIN 0.4 MG/1
TABLET SUBLINGUAL
Status: COMPLETED
Start: 2021-02-07 | End: 2021-02-07

## 2021-02-07 RX ORDER — PANTOPRAZOLE SODIUM 40 MG/1
40 TABLET, DELAYED RELEASE ORAL ONCE
Status: DISCONTINUED | OUTPATIENT
Start: 2021-02-07 | End: 2021-02-09 | Stop reason: HOSPADM

## 2021-02-07 RX ADMIN — LEVOTHYROXINE SODIUM 88 MCG: 88 TABLET ORAL at 06:01

## 2021-02-07 RX ADMIN — POTASSIUM CHLORIDE 20 MEQ: 20 TABLET, EXTENDED RELEASE ORAL at 06:01

## 2021-02-07 RX ADMIN — PANTOPRAZOLE SODIUM 40 MG: 40 TABLET, DELAYED RELEASE ORAL at 06:01

## 2021-02-07 RX ADMIN — ASPIRIN 81 MG: 81 TABLET, CHEWABLE ORAL at 07:36

## 2021-02-07 RX ADMIN — PANTOPRAZOLE SODIUM 40 MG: 40 TABLET, DELAYED RELEASE ORAL at 15:48

## 2021-02-07 RX ADMIN — FUROSEMIDE 20 MG: 10 INJECTION, SOLUTION INTRAVENOUS at 21:54

## 2021-02-07 RX ADMIN — FUROSEMIDE 20 MG: 20 TABLET ORAL at 20:08

## 2021-02-07 RX ADMIN — FUROSEMIDE 20 MG: 20 TABLET ORAL at 07:36

## 2021-02-07 RX ADMIN — BUDESONIDE AND FORMOTEROL FUMARATE DIHYDRATE 2 PUFF: 160; 4.5 AEROSOL RESPIRATORY (INHALATION) at 04:45

## 2021-02-07 RX ADMIN — CARVEDILOL 3.12 MG: 3.12 TABLET, FILM COATED ORAL at 07:36

## 2021-02-07 RX ADMIN — FUROSEMIDE 20 MG: 10 INJECTION INTRAMUSCULAR; INTRAVENOUS at 21:54

## 2021-02-07 RX ADMIN — POTASSIUM CHLORIDE 20 MEQ: 20 TABLET, EXTENDED RELEASE ORAL at 21:59

## 2021-02-07 RX ADMIN — NITROGLYCERIN 0.4 MG: 0.4 TABLET SUBLINGUAL at 20:52

## 2021-02-07 RX ADMIN — DILTIAZEM HYDROCHLORIDE 120 MG: 120 CAPSULE, COATED, EXTENDED RELEASE ORAL at 07:36

## 2021-02-07 RX ADMIN — CITALOPRAM HYDROBROMIDE 20 MG: 20 TABLET ORAL at 07:36

## 2021-02-07 RX ADMIN — RIVAROXABAN 20 MG: 20 TABLET, FILM COATED ORAL at 17:43

## 2021-02-07 RX ADMIN — PANTOPRAZOLE SODIUM 40 MG: 40 TABLET, DELAYED RELEASE ORAL at 20:08

## 2021-02-07 RX ADMIN — BUDESONIDE AND FORMOTEROL FUMARATE DIHYDRATE 2 PUFF: 160; 4.5 AEROSOL RESPIRATORY (INHALATION) at 16:12

## 2021-02-07 RX ADMIN — NITROGLYCERIN 0.4 MG: 0.4 TABLET, ORALLY DISINTEGRATING SUBLINGUAL at 20:52

## 2021-02-07 ASSESSMENT — PAIN DESCRIPTION - PAIN TYPE: TYPE: ACUTE PAIN

## 2021-02-07 ASSESSMENT — PAIN SCALES - GENERAL: PAINLEVEL_OUTOF10: 7

## 2021-02-07 ASSESSMENT — PAIN DESCRIPTION - FREQUENCY: FREQUENCY: INTERMITTENT

## 2021-02-07 ASSESSMENT — PAIN DESCRIPTION - ONSET: ONSET: GRADUAL

## 2021-02-07 ASSESSMENT — PAIN DESCRIPTION - ORIENTATION: ORIENTATION: LEFT;MID

## 2021-02-07 ASSESSMENT — PAIN DESCRIPTION - DESCRIPTORS: DESCRIPTORS: DISCOMFORT;BURNING

## 2021-02-07 ASSESSMENT — PAIN DESCRIPTION - LOCATION: LOCATION: CHEST;ABDOMEN

## 2021-02-07 NOTE — PLAN OF CARE
Problem: Falls - Risk of:  Goal: Will remain free from falls  Description: Will remain free from falls  2/6/2021 2303 by Viji Segal RN  Outcome: Ongoing     Problem: Falls - Risk of:  Goal: Absence of physical injury  Description: Absence of physical injury  2/6/2021 2303 by Viji Segal RN  Outcome: Ongoing

## 2021-02-07 NOTE — PROGRESS NOTES
Pt denied any pain. VS stable. Walks with a walker. Skin intact. #22g IV to right hand flushes well. Continent B/B LBM 2/5/21. Lung sounds diminished. PVR x2: 295 and 293. UA showed trace leukocytes, cx pending. 2L O2 via NC. Pt slept well.  Electronically signed by Enrique Palacios RN on 2/7/2021 at 5:19 AM

## 2021-02-07 NOTE — PROGRESS NOTES
Physical Therapy Rehab Treatment Note  Facility/Department: [facility]  Room: [facility]/R249-       NAME: Rosita Huff  : 1932 (80 y.o.)  MRN: [id number]  CODE STATUS: Full Code    Date of Service: 2021  Chart Reviewed: Yes  Family / Caregiver Present: No    Restrictions:  Restrictions/Precautions: Fall Risk       SUBJECTIVE: Subjective: Pt agreable to tx  Pain Screening  Patient Currently in Pain: Denies  Pre Treatment Pain Screening  Pain at present: 0  Intervention List: Patient able to continue with treatment    Post Treatment Pain Screening:  Pain Assessment  Pain Level: 0    OBJECTIVE:     Neuromuscular Education  Neuromuscular Comments: gait drills in // bars fwd/retro/lateral, fwd drills with only one UE support, static standing without UE support in // bars,    Transfers  Sit to Stand: Stand by assistance  Stand to sit: Stand by assistance  Comment: vc's for approach to chair this date    Ambulation  Ambulation?: Yes  Ambulation 1  Surface: carpet  Device: Rolling Walker  Other Apparatus: O2  Assistance: Contact guard assistance  Quality of Gait: decreased clayton heel strike, shortened step length, slow lea, increased hip flx on LLE during swing phase to compensate for foot drop  Gait Deviations: Slow Lea; Increased JAMES  Distance: 48'  Comments: distance not focus    Exercises  Hamstring Sets: x20 YTB  Hip Flexion: x20  Hip Abduction: x20 MRE / Hip ADD ball squeeze x20  Knee Long Arc Quad: x20  Neurodevelopmental Techniques: posterior shoulder rolls x10 / scap retraction x10 to improve posture     ASSESSMENT/COMMENTS:  Assessment: Pt limited this session by fatigue and SOB. Frequent RB's utilized with education on proper breathing technique. Pt challenged with gait drills this session and trialing fwd ambulation on // bars with only one UE support. Pt initially apprehensive but agreeable with encouragement. Mild unsteadiness with static standing requiring occ CGA from this PTA.  Pt able to maintain static standing without UE support x15\". Seated ther ex utilized to continue to strengthen BLE and improve endurance and overall functional mobility. PLAN OF CARE/Safety:   Plan Comment: Cont.  POC      Therapy Time:   Individual   Time In 0900   Time Out 1000   Minutes 60   Make up mins from Missed time 2/3/21  Minutes:      Transfer/Bed mobility training: 10      Gait training: 15      Neuro re education: 25     Therapeutic ex: Dima Sanchez, PTA, 02/07/21 at 12:32 PM

## 2021-02-07 NOTE — PROGRESS NOTES
Subjective: The patient complains of ,\" moderate acute on chronic progressive fatigue and    partially relieved by rest,medications, Pt, OT, and rest and exacerbated by recent illness. I am concerned about patients medical complexities. Pleurel effusions tapped. ROS x10: The patient also complains of severely impaired mobility and activities of daily living. Otherwise no new problems with vision, hearing, nose, mouth, throat, dermal, cardiovascular, GI, , pulmonary, musculoskeletal, psychiatric or neurological. See Rehab H&P on Rehab chart dated . Vital signs:  /67   Pulse 78   Temp 99 °F (37.2 °C) (Oral)   Resp 18   Ht 5' 3\" (1.6 m)   Wt 155 lb 8 oz (70.5 kg)   SpO2 92%   BMI 27.55 kg/m²     Vitals:    02/06/21 2045   BP: 112/67   Pulse: 78   Resp:    Temp:    SpO2:        I/O:   PO/Intake:  fair PO intake, no problems observed or reported. Bowel/Bladder:  continent, no problems noted. General:  Patient is well developed, adequately nourished, non-obese and     well kempt. HEENT:    PERRLA, hearing intact to loud voice, external inspection of ear     and nose benign. Inspection of lips, tongue and gums benign  Musculoskeletal: No significant change in strength or tone. All joints stable. Inspection and palpation of digits and nails show no clubbing,       cyanosis or inflammatory conditions. Neuro/Psychiatric: Affect: flat but pleasant. Alert and oriented to person, place and     Situation with    cues. No significant change in deep tendon reflexes or     sensation  Lungs:  Diminished, CTA-B. Respiration effort is normal at rest.     Heart:   S1 = S2, RRR. No loud murmurs. Abdomen:  Soft, non-tender, no enlargement of liver or spleen. Extremities:  No significant lower extremity edema or tenderness. Skin:   Intact to general survey, no visualized or palpated problems.     Rehabilitation:  Physical therapy:   Bed Mobility:      Transfers: Sit to Stand: Stand by assistance  Stand to sit: Contact guard assistance  Bed to Chair: Contact guard assistance, Ambulation 1  Surface: carpet  Device: Rolling Walker  Other Apparatus: O2  Assistance: Contact guard assistance  Quality of Gait: decreased clayton heel strike, shortened step length, slow raji  Gait Deviations: Slow Raji, Increased JAMES  Distance: 20' x 3  Comments: distance not focus, Stairs  # Steps : 8  Stairs Height: 6\"  Rails: Bilateral  Device: No Device  Assistance: Contact guard assistance  Comment: non reciprocal pattern, SOB post stair training, SpO2 93%    FIMS:  ,  , Assessment: Pt exhibits an improvement in strength and endurance secondary to stair training this session. Pt displays good safety throughout all trsfs during session. Self care in bathroom utilized to help balance training, vc's to improve technique and safety with tasks. Good follow through of vc's with visual demonstration. Occupational therapy:    ,  , Assessment: Pt is an 81 y/o female from home alone who presents to [facility] with the above deficits which impact her independence and safety during ADLs. Pt would benefit from OT services to maximize independence and safety during ADLs.     Speech therapy:        Lab/X-ray studies reviewed, analyzed and discussed with patient and staff:   Recent Results (from the past 24 hour(s))   Basic Metabolic Panel    Collection Time: 02/06/21  5:32 AM   Result Value Ref Range    Sodium 139 135 - 144 mEq/L    Potassium 4.5 3.4 - 4.9 mEq/L    Chloride 105 95 - 107 mEq/L    CO2 27 20 - 31 mEq/L    Anion Gap 7 (L) 9 - 15 mEq/L    Glucose 106 (H) 70 - 99 mg/dL    BUN 17 8 - 23 mg/dL    CREATININE 0.73 0.50 - 0.90 mg/dL    GFR Non-African American >60.0 >60    GFR  >60.0 >60    Calcium 8.4 (L) 8.5 - 9.9 mg/dL   CBC    Collection Time: 02/06/21  5:32 AM   Result Value Ref Range    WBC 7.6 4.8 - 10.8 K/uL    RBC 3.61 (L) 4.20 - 5.40 M/uL    Hemoglobin 10.4 (L) 12.0 - 16.0 g/dL    Hematocrit 31.5 (L) 37.0 - 47.0 %    MCV 87.3 82.0 - 100.0 fL    MCH 29.0 27.0 - 31.3 pg    MCHC 33.2 33.0 - 37.0 %    RDW 13.8 11.5 - 14.5 %    Platelets 651 337 - 491 K/uL   POCT Glucose    Collection Time: 02/06/21 11:21 AM   Result Value Ref Range    POC Glucose 134 (H) 60 - 115 mg/dl    Performed on ACCU-CHEK    POCT Glucose    Collection Time: 02/06/21  4:16 PM   Result Value Ref Range    POC Glucose 113 60 - 115 mg/dl    Performed on ACCU-CHEK        Echo Complete 2d W Doppler W Color    Result Date: 1/27/2021  Transthoracic Echocardiography Report (TTE)  Demographics  Patient Name   Lashay Marion        Gender              Female                 Kevin Norton  Patient Number [id number]          Race                                                   Ethnicity  Visit Number   [id number]         Room Number         V163  Corporate ID                     Date of Study       01/27/2021  Accession      [id number]        Referring Physician  Number  Date of Birth  07/12/1932        Sonographer         Althea STRONG  Age            80 year(s)        Interpreting        [facility])                                   Physician           Cardiology                                                       Poornima العراقي MD Procedure Type of Study  TTE procedure:ECHO COMPLETE 2D W/DOP W/COLOR. Procedure Date Date: 01/27/2021 Start: 10:26 AM Study Location: Portable Technical Quality: Adequate visualization Indications:Congestive heart failure. Patient Status: Routine Height: 64 inches Weight: 165 pounds BSA: 1.8 m^2 BMI: 28.32 kg/m^2 BP: 108/77 mmHg  Conclusions  Summary  Mitral annular calcification is present. 1+ MR  Left ventricular ejection fraction is visually estimated at 40%. E/A flow reversal noted. Suggestive of diastolic dysfunction.   Signature  ----------------------------------------------------------------  Electronically signed by Poornima العراقي MD(Interpreting  physician) on 01/27/2021 11:37 AM 0.08 m/s  Aortic Valve  Peak Velocity: 1.62 m/s                Mean Velocity: 1.15 m/s  Peak Gradient: 10.44 mmHg              Mean Gradient: 6.01 mmHg  Area (continuity): 1.83 cm^2           Area (2D): 1.8 cm^2  AV VTI: 28.8 cm  Cusp Separation: 1.25 cm  Tricuspid Valve  Estimated RVSP: 37.54 mmHg              Estimated RAP: 10 mmHg  TR Velocity: 2.62 m/s                   TR Gradient: 27.54 mmHg  Pulmonic Valve  Peak Velocity: 0.96 m/s           Peak Gradient: 3.66 mmHg                                    Estimated PASP: 37.54 mmHg  LVOT  Peak Velocity: 0.89 m/s              Mean Velocity: 0.57 m/s  Peak Gradient: 3.16 mmHg             Mean Gradient: 1.58 mmHg  LVOT Diameter: 2.04 cm               LVOT VTI: 16.15 cm Structures  Left Atrium  LA Dimension: 4.54 cm                        LA Area: 11.81 cm^2  LA/Aorta: 1.25  LA Volume/Index: 44.64 ml /25 m^2  Left Ventricle  Diastolic Dimension: 2.98 cm         Systolic Dimension: 2.56 cm  Septum Diastolic: 9.25 cm            Septum Systolic: 8.44 cm  PW Diastolic: 1.5 cm                 PW Systolic: 1.25 cm                                       FS: 19.1 %  LV EDV/LV EDV Index: 87.32 ml/49 m^2 LV ESV/LV ESV Index: 52.68 ml/29 m^2  EF Calculated: 39.7 %                LV Length: 6.12 cm  LVOT Diameter: 2.04 cm  Right Atrium  RA Systolic Pressure: 10 mmHg  Right Ventricle  Diastolic Dimension: 0.39 cm                                    RV Systolic Pressure: 01.61 mmHg Aorta/ Miscellaneous Aorta  Aortic Root: 3.62 cm  LVOT Diameter: 2.04 cm    Cta Chest W Wo Contrast    Result Date: 1/26/2021  EXAMINATION:  CHEST CTA WITH CONTRAST (PULMONARY EMBOLISM PROTOCOL) CLINICAL HISTORY:   PE   I50.43 Acute on chronic combined systolic and diastolic CHF (congestive heart failure) (HCC) ICD10. Technique:  Spiral axial CT acquisition of the chest from the thoracic inlet to the upper abdomen following IV contrast.  2-D images were reconstructed in the sagittal and coronal planes.  3-D MIPS images were generated in the coronal and axial planes. Images were reviewed on the PACS workstation. All images including the 3-D MIPS were personally archived. Contrast:  IV administration of 100 ml Isovue 300 All CT scans at this facility use dose modulation, iterative reconstruction, and/or weight based dosing when appropriate to reduce radiation dose to as low as reasonably achievable. Comparison:  CTA chest 3/15/2019  RESULT: Limitations: Motion artifact. Evaluation for thromboembolic disease:      - Right heart chambers:  No thromboembolic disease.      - Main pulmonary arteries:  No thromboembolic disease.      - Lobar pulmonary arteries:  No thromboembolic disease.        - Segmental pulmonary arteries:  No thromboembolic disease.        - Subsegmental pulmonary arteries:  Not well visualized due to motion. Lines, tubes, and devices:  None. Lung parenchyma and pleura: Tortuous course of the trachea. Central airways appear grossly patent. Moderate left and small right pleural effusions with associated atelectasis. Limitations in evaluation of the lung parenchyma secondary to motion. Other areas of probable scarring/atelectasis. No pneumothorax. Thoracic inlet, heart, and mediastinum: Visualized thyroid unremarkable. No axillary, mediastinal, or hilar lymphadenopathy. Ascending thoracic aorta aneurysmal, measuring around 5.0 cm, similar to prior. Descending thoracic aorta aneurysmal and measures  up to 4.8 cm, also similar to prior. Normal pulmonary artery size. Cardiomegaly, similar to prior Scattered coronary artery calcifications. Small pericardial effusion. Small hiatal hernia. Bones and soft tissues:  No destructive bone lesion or acute osseous findings. Osteopenia. Scoliosis and kyphosis and multilevel degenerative changes, grossly similar to prior. Chest wall unremarkable. Upper abdomen:  No acute abnormality in the imaged upper abdomen.  Reflux of contrast into the hepatic veins, associated including PT/OT to improve balance, ambulation, ADLs, and to improve the P/AROM. Therapeutic modifications regarding activities in therapies, place, amount of time per day and intensity of therapy made daily. In bed therapies or bedside therapies prn.   2. Bowel and Bladder dysfunction  :  frequent toileting, ambulate to bathroom with assistance, check post void residuals. Check for C.difficile x1 if >2 loose stools in 24 hours, continue bowel & bladder program.  Monitor bowel and bladder function. Lactinex 2 PO every AC. MOM prn, Brown Bomb prn, Glycerin suppository prn, enema prn. 3. Moderate   pain as well as generalized OA pain: reassess pain every shift and prior to and after each therapy session, give prn Tylenol and   , modalities prn in therapy, masage, Lidoderm, K-pad prn. Consider scheduled AM pain meds. 4. Skin healing and breakdown risk:  continue pressure relief program.  Daily skin exams and reports from nursing. 5. Severe fatigue due to nutritional and hydration deficiency: Add and titrate vitamin B12 vitamin D and CoQ10 continue to monitor I&Os, calorie counts prn, dietary consult prn.  6. Acute episodic insomnia with situational adjustment disorder:  prn Ambien, monitor for day time sedation. 7. Falls risk elevated:  patient to use call light to get nursing assistance to get up, bed and chair alarm. 8. Elevated DVT risk: progressive activities in PT, continue prophylaxis PORTILLO hose, elevation and  see mar . 9. Complex discharge planning:  Weekly team meeting every Monday Thursday to assess progress towards goals, discuss and address social, psychological and medical comorbidities and to address difficulties they may be having progressing in therapy. Patient and family education is in progress. The patient is to follow-up with their family physician after discharge.         1. Complex Active General Medical Issues that complicate care Assess & Plan:     Patient Active Problem List

## 2021-02-07 NOTE — PROGRESS NOTES
by assistance  Stand to sit: Stand by assistance  Bed to Chair: Contact guard assistance, Ambulation 1  Surface: carpet  Device: Rolling Walker  Other Apparatus: O2  Assistance: Contact guard assistance  Quality of Gait: decreased clayton heel strike, shortened step length, slow raji, increased hip flx on LLE during swing phase to compensate for foot drop  Gait Deviations: Slow Raji, Increased JAMES  Distance: 50'  Comments: distance not focus, Stairs  # Steps : 8  Stairs Height: 6\"  Rails: Bilateral  Device: No Device  Assistance: Contact guard assistance  Comment: non reciprocal pattern, SOB post stair training, SpO2 93%    FIMS:  ,  , Assessment: Pt limited this session by fatigue and SOB. Frequent RB's utilized with education on proper breathing technique. Pt challenged with gait drills this session and trialing fwd ambulation on // bars with only one UE support. Pt initially apprehensive but agreeable with encouragement. Mild unsteadiness with static standing requiring occ CGA from this PTA. Pt able to maintain static standing without UE support x15\". Seated ther ex utilized to continue to strengthen BLE and improve endurance and overall functional mobility. Occupational therapy:    ,  , Assessment: Pt is an 79 y/o female from home alone who presents to [facility] with the above deficits which impact her independence and safety during ADLs. Pt would benefit from OT services to maximize independence and safety during ADLs.     Speech therapy:        Lab/X-ray studies reviewed, analyzed and discussed with patient and staff:   Recent Results (from the past 24 hour(s))   Urinalysis    Collection Time: 02/07/21  1:48 AM   Result Value Ref Range    Color, UA Yellow Straw/Yellow    Clarity, UA Clear Clear    Glucose, Ur Negative Negative mg/dL    Bilirubin Urine Negative Negative    Ketones, Urine Negative Negative mg/dL    Specific Gravity, UA 1.019 1.005 - 1.030    Blood, Urine TRACE (A) Negative    pH, UA 5.0 5.0 - 9.0    Protein, UA Negative Negative mg/dL    Urobilinogen, Urine 0.2 <2.0 E.U./dL    Nitrite, Urine Negative Negative    Leukocyte Esterase, Urine TRACE (A) Negative   Microscopic Urinalysis    Collection Time: 02/07/21  1:48 AM   Result Value Ref Range    Bacteria, UA Negative Negative /HPF    Hyaline Casts, UA 0-1 0 - 5 /HPF    WBC, UA 3-5 0 - 5 /HPF    RBC, UA 0-2 0 - 5 /HPF    Epithelial Cells, UA 0-2 0 - 5 /HPF   Basic Metabolic Panel    Collection Time: 02/07/21  5:50 AM   Result Value Ref Range    Sodium 139 135 - 144 mEq/L    Potassium 3.9 3.4 - 4.9 mEq/L    Chloride 104 95 - 107 mEq/L    CO2 24 20 - 31 mEq/L    Anion Gap 11 9 - 15 mEq/L    Glucose 97 70 - 99 mg/dL    BUN 17 8 - 23 mg/dL    CREATININE 0.61 0.50 - 0.90 mg/dL    GFR Non-African American >60.0 >60    GFR  >60.0 >60    Calcium 8.4 (L) 8.5 - 9.9 mg/dL   CBC    Collection Time: 02/07/21  5:50 AM   Result Value Ref Range    WBC 7.4 4.8 - 10.8 K/uL    RBC 3.47 (L) 4.20 - 5.40 M/uL    Hemoglobin 10.1 (L) 12.0 - 16.0 g/dL    Hematocrit 30.5 (L) 37.0 - 47.0 %    MCV 88.1 82.0 - 100.0 fL    MCH 29.1 27.0 - 31.3 pg    MCHC 33.0 33.0 - 37.0 %    RDW 14.0 11.5 - 14.5 %    Platelets 114 145 - 897 K/uL       Echo Complete 2d W Doppler W Color    Result Date: 1/27/2021  Transthoracic Echocardiography Report (TTE)  Demographics  Patient Name   Jose Guadalupe Buggy        Gender              Female                 Rakan Forrester  Patient Number [id number]          Race                                                   Ethnicity  Visit Number   [id number]         Room Number         N349  Corporate ID                     Date of Study       01/27/2021  Accession      [id number]        Referring Physician  Number  Date of Birth  07/12/1932        Sonographer         Kerri Castro [facility]  Age            80 year(s)        Interpreting        [facility])                                   Physician           Cardiology Poornima العراقي MD Procedure Type of Study  TTE procedure:ECHO COMPLETE 2D W/DOP W/COLOR. Procedure Date Date: 01/27/2021 Start: 10:26 AM Study Location: Portable Technical Quality: Adequate visualization Indications:Congestive heart failure. Patient Status: Routine Height: 64 inches Weight: 165 pounds BSA: 1.8 m^2 BMI: 28.32 kg/m^2 BP: 108/77 mmHg  Conclusions  Summary  Mitral annular calcification is present. 1+ MR  Left ventricular ejection fraction is visually estimated at 40%. E/A flow reversal noted. Suggestive of diastolic dysfunction. Signature  ----------------------------------------------------------------  Electronically signed by Poornima العراقي MD(Interpreting  physician) on 01/27/2021 11:37 AM  ----------------------------------------------------------------  Findings Left Ventricle Left ventricular ejection fraction is visually estimated at 40%. E/A flow reversal noted. Suggestive of diastolic dysfunction. Right Ventricle Normal right ventricle structure and function. Normal right ventricle systolic pressure. Left Atrium Moderately dilated left atrium. Right Atrium Moderately enlarged right atrium size. Mitral Valve Mitral annular calcification is present. 1+ MR Tricuspid Valve Normal tricuspid valve structure and function. 1-2+ TR RVSP 38 mmHg Aortic Valve Aortic valve leaflets are moderately thickened. No AS No AR Pulmonic Valve The pulmonic valve was not well visualized . Pericardial Effusion No evidence of pericardial effusion. Pleural Effusion No evidence of pleural effusion. Aorta \ Miscellaneous The aorta is within normal limits. M-Mode Measurements (cm)  LVIDd: 4.39 cm                         LVIDs: 3.55 cm  IVSd: 1.43 cm                          IVSs: 1.55 cm  LVPWd: 1.5 cm                          LVPWs: 1.48 cm  Rt. Vent.  Dimension: 3.74 cm           AO Root Dimension: 3.62 cm                                         ACS: 1.25 cm                                         LA: 4.54 cm LVOT: 2.04 cm Doppler Measurements:  AV Velocity:0.02 m/s                    MV Peak E-Wave: 0.85 m/s  AV Peak Gradient: 10.44 mmHg            MV Peak A-Wave: 0.87 m/s  AV Mean Gradient: 6.01 mmHg  AV Area (Continuity):1.83 cm^2  TR Velocity:2.62 m/s                    Estimated RAP:10 mmHg  TR Gradient:27.54 mmHg                  RVSP:37.54 mmHg Valves  Mitral Valve  Peak E-Wave: 0.85 m/s                 Peak A-Wave: 0.87 m/s                                        E/A Ratio: 0.97                                        Peak Gradient: 2.88 mmHg                                        Deceleration Time: 232.8 msec  Tissue Doppler  E' Septal Velocity: 0.07 m/s  E' Lateral Velocity: 0.08 m/s  Aortic Valve  Peak Velocity: 1.62 m/s                Mean Velocity: 1.15 m/s  Peak Gradient: 10.44 mmHg              Mean Gradient: 6.01 mmHg  Area (continuity): 1.83 cm^2           Area (2D): 1.8 cm^2  AV VTI: 28.8 cm  Cusp Separation: 1.25 cm  Tricuspid Valve  Estimated RVSP: 37.54 mmHg              Estimated RAP: 10 mmHg  TR Velocity: 2.62 m/s                   TR Gradient: 27.54 mmHg  Pulmonic Valve  Peak Velocity: 0.96 m/s           Peak Gradient: 3.66 mmHg                                    Estimated PASP: 37.54 mmHg  LVOT  Peak Velocity: 0.89 m/s              Mean Velocity: 0.57 m/s  Peak Gradient: 3.16 mmHg             Mean Gradient: 1.58 mmHg  LVOT Diameter: 2.04 cm               LVOT VTI: 16.15 cm Structures  Left Atrium  LA Dimension: 4.54 cm                        LA Area: 11.81 cm^2  LA/Aorta: 1.25  LA Volume/Index: 44.64 ml /25 m^2  Left Ventricle  Diastolic Dimension: 2.58 cm         Systolic Dimension: 8.78 cm  Septum Diastolic: 5.93 cm            Septum Systolic: 5.29 cm  PW Diastolic: 1.5 cm                 PW Systolic: 5.61 cm                                       FS: 19.1 %  LV EDV/LV EDV Index: 87.32 ml/49 m^2 LV ESV/LV ESV Index: 52.68 ml/29 m^2  EF Calculated: 39.7 % LV Length: 6.12 cm  LVOT Diameter: 2.04 cm  Right Atrium  RA Systolic Pressure: 10 mmHg  Right Ventricle  Diastolic Dimension: 9.93 cm                                    RV Systolic Pressure: 96.95 mmHg Aorta/ Miscellaneous Aorta  Aortic Root: 3.62 cm  LVOT Diameter: 2.04 cm    Cta Chest W Wo Contrast    Result Date: 1/26/2021  EXAMINATION:  CHEST CTA WITH CONTRAST (PULMONARY EMBOLISM PROTOCOL) CLINICAL HISTORY:   PE   I50.43 Acute on chronic combined systolic and diastolic CHF (congestive heart failure) ([address] ICD10. Technique:  Spiral axial CT acquisition of the chest from the thoracic inlet to the upper abdomen following IV contrast.  2-D images were reconstructed in the sagittal and coronal planes. 3-D MIPS images were generated in the coronal and axial planes. Images were reviewed on the PACS workstation. All images including the 3-D MIPS were personally archived. Contrast:  IV administration of 100 ml Isovue 300 All CT scans at this facility use dose modulation, iterative reconstruction, and/or weight based dosing when appropriate to reduce radiation dose to as low as reasonably achievable. Comparison:  CTA chest 3/15/2019  RESULT: Limitations: Motion artifact. Evaluation for thromboembolic disease:      - Right heart chambers:  No thromboembolic disease.      - Main pulmonary arteries:  No thromboembolic disease.      - Lobar pulmonary arteries:  No thromboembolic disease.        - Segmental pulmonary arteries:  No thromboembolic disease.        - Subsegmental pulmonary arteries:  Not well visualized due to motion. Lines, tubes, and devices:  None. Lung parenchyma and pleura: Tortuous course of the trachea. Central airways appear grossly patent. Moderate left and small right pleural effusions with associated atelectasis. Limitations in evaluation of the lung parenchyma secondary to motion. Other areas of probable scarring/atelectasis. No pneumothorax.  Thoracic inlet, heart, and mediastinum: Visualized thyroid unremarkable. No axillary, mediastinal, or hilar lymphadenopathy. Ascending thoracic aorta aneurysmal, measuring around 5.0 cm, similar to prior. Descending thoracic aorta aneurysmal and measures  up to 4.8 cm, also similar to prior. Normal pulmonary artery size. Cardiomegaly, similar to prior Scattered coronary artery calcifications. Small pericardial effusion. Small hiatal hernia. Bones and soft tissues:  No destructive bone lesion or acute osseous findings. Osteopenia. Scoliosis and kyphosis and multilevel degenerative changes, grossly similar to prior. Chest wall unremarkable. Upper abdomen:  No acute abnormality in the imaged upper abdomen. Reflux of contrast into the hepatic veins, associated with right heart failure. Lower neck: Unremarkable. No CT evidence of pulmonary embolism. Moderate left and small right pleural effusions. Cardiomegaly and small pericardial effusion. Reflux of contrast into the hepatic veins, associated with right heart failure. 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 No lung consolidative opacity. Areas of probable atelectasis/scarring. Xr Chest Portable    Result Date: 1/26/2021  EXAMINATION: Portable AP ERECT view of the chest. CLINICAL HISTORY:  SOB , Negative Covid-19 test. DATE: 1/25/2021 5:41 PM COMPARISONS: 7/24/2017 FINDINGS: The heart is moderately enlarged, similar to prior exam.  Prominent interstitial markings, consistent with increasing Central vascular congestion. The costophrenic angles are blunted secondary to pleural effusions bilaterally, left greater than  right. No pneumothorax. There are infiltrates/atelectasis within lung bases, left greater than right. There are moderate degenerative changes in spine. CARDIAC ENLARGEMENT WITH CENTRAL VESSEL CONGESTION AND BILATERAL PLEURAL EFFUSIONS, POSSIBLE CONGESTIVE HEART FAILURE.  BIBASILAR INFILTRATES/ATELECTASIS, LEFT GREATER THAN RIGHT. Previous extensive, complex labs, notes and diagnostics reviewed and analyzed. ALLERGIES:    Allergies as of 02/02/2021 - Review Complete 02/02/2021   Allergen Reaction Noted    Latex  07/19/2017    Tape [adhesive tape]  06/28/2016      (please also verify by checking [facility])     Complex Physical Medicine & Rehab Issues Assess & Plan:   1. Severe abnormality of gait and mobility and impaired self-care and ADL's secondary to progressive Cardiac rehab CHF exacerbation . Functional and medical status reassessed regarding patients ability to participate in therapies and patient found to be able to participate in acute intensive comprehensive inpatient rehabilitation program including PT/OT to improve balance, ambulation, ADLs, and to improve the P/AROM. Therapeutic modifications regarding activities in therapies, place, amount of time per day and intensity of therapy made daily. In bed therapies or bedside therapies prn.   2. Bowel and Bladder dysfunction  :  frequent toileting, ambulate to bathroom with assistance, check post void residuals. Check for C.difficile x1 if >2 loose stools in 24 hours, continue bowel & bladder program.  Monitor bowel and bladder function. Lactinex 2 PO every AC. MOM prn, Brown Bomb prn, Glycerin suppository prn, enema prn. 3. Moderate   pain as well as generalized OA pain: reassess pain every shift and prior to and after each therapy session, give prn Tylenol and   , modalities prn in therapy, masage, Lidoderm, K-pad prn. Consider scheduled AM pain meds. 4. Skin healing and breakdown risk:  continue pressure relief program.  Daily skin exams and reports from nursing.   5. Severe fatigue due to nutritional and hydration deficiency: Add and titrate vitamin B12 vitamin D and CoQ10 continue to monitor I&Os, calorie counts prn, dietary consult prn.  6. Acute episodic insomnia with situational adjustment disorder:  prn Ambien, monitor for day time sedation. 7. Falls risk elevated:  patient to use call light to get nursing assistance to get up, bed and chair alarm. 8. Elevated DVT risk: progressive activities in PT, continue prophylaxis PORTILLO hose, elevation and  see mar . 9. Complex discharge planning:  Weekly team meeting every Monday Thursday to assess progress towards goals, discuss and address social, psychological and medical comorbidities and to address difficulties they may be having progressing in therapy. Patient and family education is in progress. The patient is to follow-up with their family physician after discharge.         1. Complex Active General Medical Issues that complicate care Assess & Plan:     Patient Active Problem List   Diagnosis    Lumbar stenosis with neurogenic claudication    Acquired scoliosis    Osteoporosis    Charcot Arleen Tooth muscular atrophy    Excess weight    Osteoarthritis of lumbar spine with myelopathy    Osteoarthritis    Myelopathy ([facility]    Impaired mobility and activities of daily living due to [employer], severe lumbar canal stenosis s/p Rt and Rui L3-4, L4-5 microdissection and decompression, [facility] admit 6/23/16    Headache    Depression    Ascending aortic aneurysm (HCC)    Descending aortic aneurysm (HCC)    Dizziness    Shortness of breath    Essential hypertension    Acute on chronic combined systolic and diastolic CHF (congestive heart failure) (HCC)    Gait abnormality    Paroxysmal atrial fibrillation ([address]   acutely having epigastric pain, suspect hiatal hernia, desaturated, gave NTG and Lasix along with additional Protoinix    Helen Bloom D.O., PM&R     Attending    [address]

## 2021-02-08 ENCOUNTER — APPOINTMENT (OUTPATIENT)
Dept: GENERAL RADIOLOGY | Age: 86
DRG: 091 | End: 2021-02-08
Attending: PHYSICAL MEDICINE & REHABILITATION
Payer: MEDICARE

## 2021-02-08 ENCOUNTER — APPOINTMENT (OUTPATIENT)
Dept: CT IMAGING | Age: 86
DRG: 091 | End: 2021-02-08
Attending: PHYSICAL MEDICINE & REHABILITATION
Payer: MEDICARE

## 2021-02-08 PROBLEM — J90 PLEURAL EFFUSION ON LEFT: Status: ACTIVE | Noted: 2021-02-08

## 2021-02-08 LAB
ANION GAP SERPL CALCULATED.3IONS-SCNC: 9 MEQ/L (ref 9–15)
BUN BLDV-MCNC: 20 MG/DL (ref 8–23)
CALCIUM SERPL-MCNC: 8.4 MG/DL (ref 8.5–9.9)
CHLORIDE BLD-SCNC: 100 MEQ/L (ref 95–107)
CO2: 28 MEQ/L (ref 20–31)
CREAT SERPL-MCNC: 0.84 MG/DL (ref 0.5–0.9)
GFR AFRICAN AMERICAN: >60
GFR NON-AFRICAN AMERICAN: >60
GLUCOSE BLD-MCNC: 110 MG/DL (ref 70–99)
HCT VFR BLD CALC: 33.4 % (ref 37–47)
HEMOGLOBIN: 11 G/DL (ref 12–16)
MCH RBC QN AUTO: 28.9 PG (ref 27–31.3)
MCHC RBC AUTO-ENTMCNC: 33 % (ref 33–37)
MCV RBC AUTO: 87.6 FL (ref 82–100)
PDW BLD-RTO: 13.8 % (ref 11.5–14.5)
PLATELET # BLD: 278 K/UL (ref 130–400)
POTASSIUM SERPL-SCNC: 4.2 MEQ/L (ref 3.4–4.9)
RBC # BLD: 3.81 M/UL (ref 4.2–5.4)
SODIUM BLD-SCNC: 137 MEQ/L (ref 135–144)
WBC # BLD: 8.3 K/UL (ref 4.8–10.8)

## 2021-02-08 PROCEDURE — 6370000000 HC RX 637 (ALT 250 FOR IP): Performed by: PHYSICAL MEDICINE & REHABILITATION

## 2021-02-08 PROCEDURE — 71045 X-RAY EXAM CHEST 1 VIEW: CPT

## 2021-02-08 PROCEDURE — 6360000002 HC RX W HCPCS: Performed by: INTERNAL MEDICINE

## 2021-02-08 PROCEDURE — 36415 COLL VENOUS BLD VENIPUNCTURE: CPT

## 2021-02-08 PROCEDURE — 6370000000 HC RX 637 (ALT 250 FOR IP): Performed by: INTERNAL MEDICINE

## 2021-02-08 PROCEDURE — 2580000003 HC RX 258: Performed by: INTERNAL MEDICINE

## 2021-02-08 PROCEDURE — 1180000000 HC REHAB R&B

## 2021-02-08 PROCEDURE — 6370000000 HC RX 637 (ALT 250 FOR IP): Performed by: PHYSICIAN ASSISTANT

## 2021-02-08 PROCEDURE — 2700000000 HC OXYGEN THERAPY PER DAY

## 2021-02-08 PROCEDURE — 85027 COMPLETE CBC AUTOMATED: CPT

## 2021-02-08 PROCEDURE — 2500000003 HC RX 250 WO HCPCS: Performed by: SURGERY

## 2021-02-08 PROCEDURE — 94761 N-INVAS EAR/PLS OXIMETRY MLT: CPT

## 2021-02-08 PROCEDURE — 99233 SBSQ HOSP IP/OBS HIGH 50: CPT | Performed by: INTERNAL MEDICINE

## 2021-02-08 PROCEDURE — 94640 AIRWAY INHALATION TREATMENT: CPT

## 2021-02-08 PROCEDURE — 99232 SBSQ HOSP IP/OBS MODERATE 35: CPT | Performed by: INTERNAL MEDICINE

## 2021-02-08 PROCEDURE — 80048 BASIC METABOLIC PNL TOTAL CA: CPT

## 2021-02-08 PROCEDURE — 99233 SBSQ HOSP IP/OBS HIGH 50: CPT | Performed by: PHYSICAL MEDICINE & REHABILITATION

## 2021-02-08 PROCEDURE — 99223 1ST HOSP IP/OBS HIGH 75: CPT | Performed by: SURGERY

## 2021-02-08 PROCEDURE — 74177 CT ABD & PELVIS W/CONTRAST: CPT

## 2021-02-08 PROCEDURE — 6360000004 HC RX CONTRAST MEDICATION: Performed by: SURGERY

## 2021-02-08 RX ORDER — FUROSEMIDE 10 MG/ML
20 INJECTION INTRAMUSCULAR; INTRAVENOUS DAILY
Status: DISCONTINUED | OUTPATIENT
Start: 2021-02-08 | End: 2021-02-09

## 2021-02-08 RX ADMIN — BUDESONIDE AND FORMOTEROL FUMARATE DIHYDRATE 2 PUFF: 160; 4.5 AEROSOL RESPIRATORY (INHALATION) at 04:10

## 2021-02-08 RX ADMIN — POTASSIUM CHLORIDE 20 MEQ: 20 TABLET, EXTENDED RELEASE ORAL at 08:12

## 2021-02-08 RX ADMIN — IOPAMIDOL 100 ML: 612 INJECTION, SOLUTION INTRAVENOUS at 10:40

## 2021-02-08 RX ADMIN — FUROSEMIDE 20 MG: 10 INJECTION, SOLUTION INTRAVENOUS at 08:12

## 2021-02-08 RX ADMIN — RIVAROXABAN 20 MG: 20 TABLET, FILM COATED ORAL at 17:10

## 2021-02-08 RX ADMIN — BUDESONIDE AND FORMOTEROL FUMARATE DIHYDRATE 2 PUFF: 160; 4.5 AEROSOL RESPIRATORY (INHALATION) at 17:54

## 2021-02-08 RX ADMIN — SODIUM CHLORIDE, PRESERVATIVE FREE 10 ML: 5 INJECTION INTRAVENOUS at 08:12

## 2021-02-08 RX ADMIN — ASPIRIN 81 MG: 81 TABLET, CHEWABLE ORAL at 08:12

## 2021-02-08 RX ADMIN — PANTOPRAZOLE SODIUM 40 MG: 40 TABLET, DELAYED RELEASE ORAL at 05:33

## 2021-02-08 RX ADMIN — DILTIAZEM HYDROCHLORIDE 120 MG: 120 CAPSULE, COATED, EXTENDED RELEASE ORAL at 08:12

## 2021-02-08 RX ADMIN — CARVEDILOL 3.12 MG: 3.12 TABLET, FILM COATED ORAL at 22:51

## 2021-02-08 RX ADMIN — PANTOPRAZOLE SODIUM 40 MG: 40 TABLET, DELAYED RELEASE ORAL at 17:10

## 2021-02-08 RX ADMIN — CARVEDILOL 3.12 MG: 3.12 TABLET, FILM COATED ORAL at 08:12

## 2021-02-08 RX ADMIN — BARIUM SULFATE 400 ML: 20 SUSPENSION ORAL at 08:42

## 2021-02-08 RX ADMIN — CITALOPRAM HYDROBROMIDE 20 MG: 20 TABLET ORAL at 08:12

## 2021-02-08 RX ADMIN — LEVOTHYROXINE SODIUM 88 MCG: 88 TABLET ORAL at 05:33

## 2021-02-08 SDOH — SOCIAL STABILITY: SOCIAL NETWORK: HOW OFTEN DO YOU ATTEND CHURCH OR RELIGIOUS SERVICES?: 1 TO 4 TIMES PER YEAR

## 2021-02-08 SDOH — SOCIAL STABILITY: SOCIAL NETWORK
DO YOU BELONG TO ANY CLUBS OR ORGANIZATIONS SUCH AS CHURCH GROUPS UNIONS, FRATERNAL OR ATHLETIC GROUPS, OR SCHOOL GROUPS?: NO

## 2021-02-08 SDOH — HEALTH STABILITY: MENTAL HEALTH
STRESS IS WHEN SOMEONE FEELS TENSE, NERVOUS, ANXIOUS, OR CAN'T SLEEP AT NIGHT BECAUSE THEIR MIND IS TROUBLED. HOW STRESSED ARE YOU?: ONLY A LITTLE

## 2021-02-08 SDOH — SOCIAL STABILITY: SOCIAL NETWORK: HOW OFTEN DO YOU GET TOGETHER WITH FRIENDS OR RELATIVES?: MORE THAN THREE TIMES A WEEK

## 2021-02-08 SDOH — HEALTH STABILITY: PHYSICAL HEALTH: ON AVERAGE, HOW MANY MINUTES DO YOU ENGAGE IN EXERCISE AT THIS LEVEL?: 0 MIN

## 2021-02-08 SDOH — HEALTH STABILITY: PHYSICAL HEALTH: ON AVERAGE, HOW MANY DAYS PER WEEK DO YOU ENGAGE IN MODERATE TO STRENUOUS EXERCISE (LIKE A BRISK WALK)?: 0 DAYS

## 2021-02-08 SDOH — SOCIAL STABILITY: SOCIAL NETWORK: HOW OFTEN DO YOU ATTENT MEETINGS OF THE CLUB OR ORGANIZATION YOU BELONG TO?: NEVER

## 2021-02-08 ASSESSMENT — PAIN DESCRIPTION - PAIN TYPE: TYPE: ACUTE PAIN

## 2021-02-08 ASSESSMENT — ENCOUNTER SYMPTOMS
NAUSEA: 0
CHEST TIGHTNESS: 0
SHORTNESS OF BREATH: 0
COLOR CHANGE: 0
VOMITING: 0

## 2021-02-08 ASSESSMENT — PAIN DESCRIPTION - LOCATION: LOCATION: ABDOMEN

## 2021-02-08 NOTE — PROGRESS NOTES
Progress Note  Patient: Jesu Tomlinson  Unit/Bed: Z305/S189-16  YOB: 1932  MRN: [id number]  Acct: [de-identified]   Admitting Diagnosis: Impaired mobility [Z74.09]  Date:  2/2/2021  Hospital Day: 6    Chief Complaint:  Impaired mobility/NICMP/Recent PA-fib RVR/nonobstructive CAD    Subjective      2/3/21: Patient was transferred to rehab yesterday following hospitalization for acute decompensated systolic heart failure and new nonischemic cardiomyopathy with EF of 40%. She underwent left heart catheterization on 2/1/2021 which showed 60% mid LAD lesion with negative IFR. During her hospitalization she was noted to have new onset paroxysmal A. fib RVR and was initiated on oral anticoagulation with Xarelto. Also, during her hospitalization, she was noted to have moderate sized left pleural effusion and underwent left thoracentesis on 1/28/2021 with aspiration of 755 mL pleural fluid. Analysis of pleural fluid showed exudative lymphocyte predominant pleural effusion with main concern for malignancy which will need monitored as outpatient with repeat CT chest in 6 to 8 weeks and outpatient follow-up with pulmonology for cytology results. She was optimized on medical therapy and stable for transfer to rehab yesterday. Patient resting comfortably in bed in no acute distress. Complaining of dizziness this morning and has a cool washcloth on her forehead currently. Denies chest pain or shortness of breath. Taylor catheter in place draining dark/turbid urine. A.m. labs reviewed. Renal function and electrolytes stable. Hemoglobin low but stable at 10.3.    2/5/2021: Resting comfort. Denies any chest pain or shortness of breath.    2-8-21: developed SOB overnight and taylor replaced and had significant urine outpust.   BP is marginal.       Review of Systems:   Review of Systems   Constitutional: Negative for activity change. HENT: Negative for congestion.     Respiratory: Negative for chest tightness and shortness of breath. Cardiovascular: Negative for chest pain, palpitations and leg swelling. Gastrointestinal: Negative for nausea and vomiting. Genitourinary: Zeng cathter in place   Skin: Negative for color change and pallor. Neurological: Positive for dizziness. Negative for syncope. Psychiatric/Behavioral: Negative for agitation and behavioral problems. Physical Examination:    /64   Pulse 62   Temp 99 °F (37.2 °C) (Oral)   Resp 16   Ht 5' 3\" (1.6 m)   Wt 143 lb (64.9 kg)   SpO2 95%   BMI 25.33 kg/m²    Physical Exam  Constitutional:       General: She is not in acute distress. Appearance: Normal appearance. HENT:      Head: Normocephalic and atraumatic. Neck:      Musculoskeletal: Normal range of motion and neck supple. Cardiovascular:      Rate and Rhythm: Normal rate and regular rhythm. Pulmonary:      Effort: Pulmonary effort is normal. No respiratory distress. Breath sounds: No wheezing, rhonchi or rales. Abdominal:      Palpations: Abdomen is soft. Tenderness: There is no abdominal tenderness. Musculoskeletal: Normal range of motion. Right lower leg: No edema. Left lower leg: No edema. Skin:     General: Skin is warm and dry. Neurological:      General: No focal deficit present. Mental Status: She is alert. Cranial Nerves: No cranial nerve deficit.    Psychiatric:         Mood and Affect: Mood normal.         Behavior: Behavior normal.         LABS:  CBC:   Lab Results   Component Value Date    WBC 8.3 02/08/2021    RBC 3.81 02/08/2021    HGB 11.0 02/08/2021    HCT 33.4 02/08/2021    MCV 87.6 02/08/2021    MCH 28.9 02/08/2021    MCHC 33.0 02/08/2021    RDW 13.8 02/08/2021     02/08/2021     CBC with Differential:   Lab Results   Component Value Date    WBC 8.3 02/08/2021    RBC 3.81 02/08/2021    HGB 11.0 02/08/2021    HCT 33.4 02/08/2021     02/08/2021    MCV 87.6 02/08/2021    MCH 28.9 02/08/2021    MCHC 33.0 02/08/2021    RDW 13.8 02/08/2021    LYMPHOPCT 9.0 01/25/2021    MONOPCT 8.7 01/25/2021    BASOPCT 0.4 01/25/2021    MONOSABS 1.0 01/25/2021    LYMPHSABS 1.0 01/25/2021    EOSABS 0.1 01/25/2021    BASOSABS 0.0 01/25/2021     CMP:    Lab Results   Component Value Date     02/08/2021    K 4.2 02/08/2021    K 4.1 01/25/2021     02/08/2021    CO2 28 02/08/2021    BUN 20 02/08/2021    CREATININE 0.84 02/08/2021    GFRAA >60.0 02/08/2021    LABGLOM >60.0 02/08/2021    GLUCOSE 110 02/08/2021    PROT 7.3 01/25/2021    LABALBU 3.5 01/25/2021    CALCIUM 8.4 02/08/2021    BILITOT 0.4 01/25/2021    ALKPHOS 57 01/25/2021    AST 23 01/25/2021    ALT 20 01/25/2021     BMP:    Lab Results   Component Value Date     02/08/2021    K 4.2 02/08/2021    K 4.1 01/25/2021     02/08/2021    CO2 28 02/08/2021    BUN 20 02/08/2021    LABALBU 3.5 01/25/2021    CREATININE 0.84 02/08/2021    CALCIUM 8.4 02/08/2021    GFRAA >60.0 02/08/2021    LABGLOM >60.0 02/08/2021    GLUCOSE 110 02/08/2021     Magnesium:    Lab Results   Component Value Date    MG 1.9 10/02/2020     Troponin:    Lab Results   Component Value Date    TROPONINI <0.010 01/26/2021       Radiology:      CTA Chest 1/25/21:  Impression       No CT evidence of pulmonary embolism.       Moderate left and small right pleural effusions.       Cardiomegaly and small pericardial effusion. Reflux of contrast into the hepatic veins, associated with right heart failure.       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       No lung consolidative opacity. Areas of probable atelectasis/scarring. Echocardiogram 1/27/21:  Conclusions   Summary   Mitral annular calcification is present. 1+ MR   Left ventricular ejection fraction is visually estimated at 40%. E/A flow reversal noted. Suggestive of diastolic dysfunction.    Signature 6. Full dose anticoagulation, Xarelto  7. Check EKG periodically. 8. Pulmonary recommendations- follow up CT of chest in near future. 9. Further recommendations to follow.        Electronically signed by Jane Lozano DO on 2/3/21 at 12:16 PM EST

## 2021-02-08 NOTE — PROGRESS NOTES
Full code. Pt c/o epigastric pain and SOB. VS below. Walks with a walker. Skin intact. #22g IV to right hand flushes well. Continent B/B LBM 2/5/21.  Lung sounds diminished, fluid noted lower lobes. PVR x2: 295 and 293. Zeng catheter placed with clear yellow urine returned. 4L O2 via NC pulse ox 86%. Respiratory Alice called placed pt on high flow non heated O2 12 L, pt 95% pulse ox. PO Lasix 20 mg, Protonix 40 mg, Potassium 20 meq and Nitro SL 0.4 mg given per Dr. Milvia Albarran. STAT CXR done. Dr. Cuate Sotelo made aware: ordered IV Lasix 20 mg. Pt currently sleeping in no distress. Will continue to monitor. Electronically signed by Kriss Nielsen RN on 2/7/2021 at 10:49 PM  Vitals:    02/07/21 2100 02/07/21 2111 02/07/21 2200 02/07/21 2225   BP: 109/70  (!) 150/76 (!) 84/50   Pulse: 85  75 61   Resp: 20 16 18 18   SpO2: 94% 94% 91% 95%   O2 12L 12L 12L 12L     Update: /61 HR 95 10 L O2 high flow 94%. Did become dizzy with ambulation and pulse ox dropped to 88% on 6L and HR in the 100's. Pt currently resting in bed with no distress. Denies any dizziness at this time. RT Alice seen pt. Navdeep updated. Electronically signed by Kriss Nielsen RN on 2/8/2021 at 4:23 AM  Update: Dr. Cuate Sotelo saw pt. Changed PO Lasix 20 to IV daily and will f/u with Cardio in am. Electronically signed by Kriss Nielsen RN on 2/8/2021 at 4:49 AM  Dr. Shery Lesch made aware and ordered CXR in am and IV Lasix 20 daily and to notify pulmonology. Dr. Philip Maloney made aware awaiting response. Electronically signed by Kriss Nielsen RN on 2/8/2021 at 6:55 AM  Results for Ollie Alex (MRN [id number]) as of 2/8/2021 06:56   Ref.  Range 2/8/2021 05:29   Sodium Latest Ref Range: 135 - 144 mEq/L 137   Potassium Latest Ref Range: 3.4 - 4.9 mEq/L 4.2   Chloride Latest Ref Range: 95 - 107 mEq/L 100   CO2 Latest Ref Range: 20 - 31 mEq/L 28   BUN Latest Ref Range: 8 - 23 mg/dL 20   Creatinine Latest Ref Range: 0.50 - 0.90 mg/dL 0.84   Anion Gap Latest Ref Range: 9 - 15 mEq/L 9   GFR Non- Latest Ref Range: >60  >60.0   GFR African American Latest Ref Range: >60  >60.0   Glucose Latest Ref Range: 70 - 99 mg/dL 110 (H)   Calcium Latest Ref Range: 8.5 - 9.9 mg/dL 8.4 (L)   WBC Latest Ref Range: 4.8 - 10.8 K/uL 8.3   RBC Latest Ref Range: 4.20 - 5.40 M/uL 3.81 (L)   Hemoglobin Quant Latest Ref Range: 12.0 - 16.0 g/dL 11.0 (L)   Hematocrit Latest Ref Range: 37.0 - 47.0 % 33.4 (L)   MCV Latest Ref Range: 82.0 - 100.0 fL 87.6   MCH Latest Ref Range: 27.0 - 31.3 pg 28.9   MCHC Latest Ref Range: 33.0 - 37.0 % 33.0   RDW Latest Ref Range: 11.5 - 14.5 % 13.8   Platelet Count Latest Ref Range: 130 - 400 K/uL 278

## 2021-02-08 NOTE — PROGRESS NOTES
Hospitalist Progress Note  2/8/2021 1:38 PM    Assessment and Plan:   1. Acute Respiratory distress with hypoxia secondary to vascular congestion: Patient now requiring 8 L high flow O2, slightly improved after patient was given IV Lasix 20 mg overnight. Pulmonology and cardiology following. She remains slightly hypotensive. Will await cardiology recommendations. 2. Generalized weakness, Gait instability and Decreased  Functional Status secondary to new onset cardiomyopathy: S/p cardiac cath. Cardiology managing. EF 40%. Cardiac cath showed moderate mid LAD stenosis with negative IFR. Fall precautions. PT OT to evaluate. Maximize nutrition status. Assessing if needs DME at home. SW on board. Dr. Keitha Mortimer managing rehab plan. 3. PAF: Cardiology following. Rate controlled. Goal K and Mg 4.0 and 2.0, respectively. On Long term anticoagulation  4. HTN: Cardiology managing. Cozaar on hold. 5. CAD: Cardiology following and managing. 6. Pleural effusion: S/p thoracentesis. Concerned for malignancy. Pulmonary following. Will need frequent monitoring. May need VATS and pleural biopsy. F/U pulmonary outpatient with repeat CXR. If pleural effusion present then pulm will repeat thoracentesis and cytology. If negative, will need VATS and pleural biopsy. PFT outpatient. 7. Combined systolic diastolic heart failure: Cardiology following. On lasix. Goal K and Mg 4.0 and 2.0, respectively. On ASA, BB,  ACEI/ARB. Monitor weights. Monitor for signs/ symptoms of volume overload. Elevate lower exts while at rest  8. Thoracic Aortic Aneurysm: Stable. Cardiology following. 9. Hypothyroidism: continue synthroid  10. Depression: Continue celexa  11. 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 PT/OT  12. Bowel Regimen and GI PPx: stool softners PRN ordered with hold parameters for loose stools or diarrhea. On antiacid  13.  Diet: DIET LOW SODIUM 2 GM; Lactose controlled  14. Advance Directive: Full Code   15. Nutrition status: Supplemental Vitamins ordered. Dietitian assessment  16. Vaccinations: Immunization records reviewed. If has not received appropriate vaccinations, will order to be given prior to discharge. 17. DVT prophylaxis: On xarelto  18. Discharge planning: SW on board. 19. High Risk Readmission Screening Tool Score Noted. 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 *      ** Total time spent reviewing medical records, evaluating patient, speaking with RN's and consultants where I was focused exclusively on this patient: 25 minutes. This time is excluding time spent performing procedures or significant events occurring earlier or later in the day requiring my attention and focus. Subjective:   Admit Date: 2/2/2021  PCP: Santosh Arreola MD    Patient became acutely hypoxic overnight. Requiring 12 L high flow O2 which has now improved to 8 L. Lung sounds improved, no wheezing diminished in bases. She denies orthopnea. Afebrile . Telemetry reviewed. Pt denies chest pain, N/V, fevers or chills. Objective:     Vitals:    02/08/21 0530 02/08/21 0600 02/08/21 0725 02/08/21 1115   BP: 117/80  101/64    Pulse: 122 82 62    Resp:   16    Temp: 97.7 °F (36.5 °C)  99 °F (37.2 °C)    TempSrc: Oral  Oral    SpO2: 94% 96% 96% 99%   Weight:       Height:         General appearance: A+ O x2- 3.   No conversational dyspnea noted. [place]  Dentition intact. Answers questions appropriately  Lungs:  Diminished. No exp wheezes, No rales, No retractions; No use of accessory muscles  Heart:  S1, S2 normal, RRR, no MRG appreciated  Abdomen: (+) BS, soft, non-tender; non distended no guarding or rigidity. Extremities:  no cyanosis, trace edema bilat lower exts, no calf tenderness bilaterally.  Dry skin noted   : taylor catheter      Medications:      furosemide  20 mg Intravenous Daily    pantoprazole  40 mg Oral Once    pantoprazole  40 mg Oral BID AC    budesonide-formoterol  2 puff Inhalation BID    aspirin  81 mg Oral Daily    carvedilol  3.125 mg Oral BID    citalopram  20 mg Oral Daily    dilTIAZem  120 mg Oral Daily    levothyroxine  88 mcg Oral Daily    [Held by provider] losartan  25 mg Oral Daily    potassium chloride  20 mEq Oral Daily with breakfast    rivaroxaban  20 mg Oral Daily       LABS Reviewed    IMAGING Reviewed    Meir Schrader CNP  Rounding Hospitalist

## 2021-02-08 NOTE — PLAN OF CARE
Problem: Falls - Risk of:  Goal: Will remain free from falls  Description: Will remain free from falls  2/7/2021 2327 by Deborah Kc RN  Outcome: Ongoing     Problem: Falls - Risk of:  Goal: Absence of physical injury  Description: Absence of physical injury  2/7/2021 2327 by Deborah Kc RN  Outcome: Ongoing     Problem: Pain:  Goal: Pain level will decrease  Description: Pain level will decrease  2/7/2021 2327 by Deborah Kc RN  Outcome: Ongoing     Problem: Pain:  Goal: Control of acute pain  Description: Control of acute pain  2/7/2021 2327 by Deborah Kc RN  Outcome: Ongoing     Problem: Pain:  Goal: Control of chronic pain  Description: Control of chronic pain  2/7/2021 2327 by Deborah Kc RN  Outcome: Ongoing

## 2021-02-08 NOTE — CONSULTS
Hematology/Oncology Consult  Encounter Date: 2021 2:53 PM    Ms. Holly Porter is a 80 y.o. female  : 1932  MRN: [id number]  Acct Number: [de-identified]  Requesting Provider: DR Velarde    Reason for request: Atypical cell from thoracentesis. CONSULTANT: Denver Leavitt    HPI: Maurice Halsted was admitted for shortness of breathe. Diagnosed with CHP and left pleural effusion. Thoracentesis done 2021. 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 Patient Active Problem List   Diagnosis    Lumbar stenosis with neurogenic claudication    Acquired scoliosis    Osteoporosis    Charcot Arleen Tooth muscular atrophy    Excess weight    Osteoarthritis of lumbar spine with myelopathy    Osteoarthritis    Myelopathy ([address]    Impaired mobility and activities of daily living due to CHF exac, [facility] admit     Headache    Depression    Ascending aortic aneurysm (HCC)    Descending aortic aneurysm (HCC)    Dizziness    Shortness of breath    Essential hypertension    Acute on chronic combined systolic and diastolic CHF (congestive heart failure) (HCC)    Gait abnormality    Paroxysmal atrial fibrillation (HCC)     Past Medical History:   Diagnosis Date    Ascending aortic aneurysm ([address] 2017    Charcot Arleen Tooth muscular atrophy     Depression     Descending aortic aneurysm ([address] 2017    Essential hypertension 2018    Headache     HTN (hypertension)     Impaired mobility and activities of daily living     Lumbar stenosis with neurogenic claudication     Myelopathy ([address]     Osteoarthritis      @PSH@  Family History   Problem Relation Age of Onset    Arthritis Mother     Arthritis Father     High Blood Pressure Father      Social History     Socioeconomic History    Marital status:       Spouse name: Not on file    Number of children: Not on file    Years of education: Not on file    Highest education level: Not on file   Occupational History    Not on file   Social Needs    Financial resource strain: Not on file    Food insecurity     Worry: Not on file     Inability: Not on file    Transportation needs     Medical: Not on file     Non-medical: Not on file   Tobacco Use    Smoking status: Never Smoker    Smokeless tobacco: Never Used   Substance and Sexual Activity    Alcohol use: No     Alcohol/week: 0.0 standard drinks    Drug use: No    Sexual activity: Not on file   Lifestyle    Physical activity     Days per week: 0 days     Minutes per session: 0 min    Stress: Only a little   Relationships    Social connections     Talks on phone: More than three times a week     Gets together: More than three times a week     Attends Yarsani service: 1 to 4 times per year     Active member of club or organization: No     Attends meetings of clubs or organizations: Never     Relationship status:      Intimate partner violence     Fear of current or ex partner: No     Emotionally abused: No     Physically abused: No     Forced sexual activity: No   Other Topics Concern    Not on file   Social History Narrative    Lives With: Alone, son lives down the street, dtr is in the area    Has a son in the area    Type of Home: [address]: One level    Home Access: Stairs to enter with rails- Number of Steps: 2- Rails: Both    Bathroom Shower/Tub: Tub/Shower unit, Bathroom Equipment: Grab bars in shower, Shower chair    Home Equipment: Rolling walker, Cane(Pt infrequemtly uses DME for ambulation and prefers to furniture walk in home)    ADL Assistance: Independent, [address]: Independent    Homemaking Responsibilities: Yes    Ambulation Assistance: Independent, Transfer Assistance: Independent    Additional Comments: Son stops over 2 times daily                Current Facility-Administered Medications   Medication Dose Route Frequency Provider Last Rate Last Admin    furosemide (LASIX) injection 20 mg  20 mg Intravenous Daily Fredick Goodpasture, MD   20 mg at 02/08/21 8842    pantoprazole (PROTONIX) tablet 40 mg  40 mg Oral Once Dong Garrison MD        nitroGLYCERIN (NITROSTAT) SL tablet 0.4 mg  0.4 mg Sublingual Q5 Min PRN Dong Garrison MD   0.4 mg at 02/07/21 2052    pantoprazole (PROTONIX) tablet 40 mg  40 mg Oral BID AC Dong Garrison MD   40 mg at 02/08/21 0533    budesonide-formoterol (SYMBICORT) 160-4.5 MCG/ACT inhaler 2 puff  2 puff Inhalation BID Jannie Alan MD   2 puff at 02/08/21 0410    acetaminophen (TYLENOL) tablet 650 mg  650 mg Oral Q6H PRN [facility], DO        polyethylene glycol (GLYCOLAX) packet 17 g  17 g Oral Daily PRN [facility], DO   17 g at 02/05/21 1011    promethazine (PHENERGAN) tablet 12.5 mg  12.5 mg Oral Q6H PRN [facility], DO        Or    ondansetron (ZOFRAN) injection 4 mg  4 mg Intravenous Q6H PRN [facility], DO        sodium chloride flush 0.9 % injection 10 mL  10 mL Intravenous PRN [facility], DO   10 mL at 02/08/21 9336    aspirin chewable tablet 81 mg  81 mg Oral Daily [facility], DO   81 mg at 02/08/21 0910    carvedilol (COREG) tablet 3.125 mg  3.125 mg Oral BID ASHLEY Blue   3.125 mg at 02/08/21 6404    citalopram (CELEXA) tablet 20 mg  20 mg Oral Daily [facility], DO   20 mg at 02/08/21 3019    dilTIAZem (CARDIZEM CD) extended release capsule 120 mg  120 mg Oral Daily [facility], DO   120 mg at 02/08/21 2784    levothyroxine (SYNTHROID) tablet 88 mcg  88 mcg Oral Daily [facility], DO   88 mcg at 02/08/21 0533    LORazepam (ATIVAN) tablet 0.5 mg  0.5 mg Oral Q6H PRN [facility], DO   0.5 mg at 02/03/21 1321    [Held by provider] losartan (COZAAR) tablet 25 mg  25 mg Oral Daily Jorge LAKE Holiday, DO   25 mg at 02/03/21 0910    potassium chloride (KLOR-CON M) extended release tablet 20 mEq  20 mEq Oral Daily with breakfast Jorge Calcium 8.4 (L) 8.5 - 9.9 mg/dL   CBC    Collection Time: 02/08/21  5:29 AM   Result Value Ref Range    WBC 8.3 4.8 - 10.8 K/uL    RBC 3.81 (L) 4.20 - 5.40 M/uL    Hemoglobin 11.0 (L) 12.0 - 16.0 g/dL    Hematocrit 33.4 (L) 37.0 - 47.0 %    MCV 87.6 82.0 - 100.0 fL    MCH 28.9 27.0 - 31.3 pg    MCHC 33.0 33.0 - 37.0 %    RDW 13.8 11.5 - 14.5 %    Platelets 065 855 - 013 K/uL     Recent Labs     02/07/21  0148 02/07/21  0148 02/08/21  0529   COLORU Yellow  --   --    PHUR 5.0  --   --    WBCUA 3-5  --   --    RBCUA 0-2  --   --    BACTERIA Negative  --   --    CLARITYU Clear  --   --    SPECGRAV 1.019  --   --    LEUKOCYTESUR TRACE*  --   --    UROBILINOGEN 0.2  --   --    BILIRUBINUR Negative  --   --    BLOODU TRACE*  --   --    GLUCOSE  --    < > 110*    < > = values in this interval not displayed. Pathology:     RADIOLOGY RESULTS:  Echo Complete 2d W Doppler W Color    Result Date: 1/27/2021  Transthoracic Echocardiography Report (TTE)  Demographics  Patient Name   Maria Alejandra Pak        Gender              Female                 Kari Morelos  Patient Number [id number]          Race                                                   Ethnicity  Visit Number   [id number]         Room Number         Z174  Corporate ID                     Date of Study       01/27/2021  Accession      [id number]        Referring Physician  Number  Date of Birth  07/12/1932        Sonographer         Tori Carter [facility]  Age            80 year(s)        Interpreting        [facility])                                   Physician           Cardiology                                                       Bhumi Braun MD Procedure Type of Study  TTE procedure:ECHO COMPLETE 2D W/DOP W/COLOR. Procedure Date Date: 01/27/2021 Start: 10:26 AM Study Location: Portable Technical Quality: Adequate visualization Indications:Congestive heart failure.  Patient Status: Routine Height: 64 inches Weight: 165 pounds BSA: 1.8 m^2 BMI: 28.32 kg/m^2 BP: 108/77 mmHg  Conclusions  Summary  Mitral annular calcification is present. 1+ MR  Left ventricular ejection fraction is visually estimated at 40%. E/A flow reversal noted. Suggestive of diastolic dysfunction. Signature  ----------------------------------------------------------------  Electronically signed by Marleni Clemens MD(Interpreting  physician) on 01/27/2021 11:37 AM  ----------------------------------------------------------------  Findings Left Ventricle Left ventricular ejection fraction is visually estimated at 40%. E/A flow reversal noted. Suggestive of diastolic dysfunction. Right Ventricle Normal right ventricle structure and function. Normal right ventricle systolic pressure. Left Atrium Moderately dilated left atrium. Right Atrium Moderately enlarged right atrium size. Mitral Valve Mitral annular calcification is present. 1+ MR Tricuspid Valve Normal tricuspid valve structure and function. 1-2+ TR RVSP 38 mmHg Aortic Valve Aortic valve leaflets are moderately thickened. No AS No AR Pulmonic Valve The pulmonic valve was not well visualized . Pericardial Effusion No evidence of pericardial effusion. Pleural Effusion No evidence of pleural effusion. Aorta \ Miscellaneous The aorta is within normal limits. M-Mode Measurements (cm)  LVIDd: 4.39 cm                         LVIDs: 3.55 cm  IVSd: 1.43 cm                          IVSs: 1.55 cm  LVPWd: 1.5 cm                          LVPWs: 1.48 cm  Rt. Vent.  Dimension: 3.74 cm           AO Root Dimension: 3.62 cm                                         ACS: 1.25 cm                                         LA: 4.54 cm                                         LVOT: 2.04 cm Doppler Measurements:  AV Velocity:0.02 m/s                    MV Peak E-Wave: 0.85 m/s  AV Peak Gradient: 10.44 mmHg            MV Peak A-Wave: 0.87 m/s  AV Mean Gradient: 6.01 mmHg  AV Area (Continuity):1.83 cm^2  TR Velocity:2.62 m/s                    Estimated RAP:10 mmHg  TR Gradient:27.54 mmHg                  RVSP:37.54 mmHg Valves  Mitral Valve  Peak E-Wave: 0.85 m/s                 Peak A-Wave: 0.87 m/s                                        E/A Ratio: 0.97                                        Peak Gradient: 2.88 mmHg                                        Deceleration Time: 232.8 msec  Tissue Doppler  E' Septal Velocity: 0.07 m/s  E' Lateral Velocity: 0.08 m/s  Aortic Valve  Peak Velocity: 1.62 m/s                Mean Velocity: 1.15 m/s  Peak Gradient: 10.44 mmHg              Mean Gradient: 6.01 mmHg  Area (continuity): 1.83 cm^2           Area (2D): 1.8 cm^2  AV VTI: 28.8 cm  Cusp Separation: 1.25 cm  Tricuspid Valve  Estimated RVSP: 37.54 mmHg              Estimated RAP: 10 mmHg  TR Velocity: 2.62 m/s                   TR Gradient: 27.54 mmHg  Pulmonic Valve  Peak Velocity: 0.96 m/s           Peak Gradient: 3.66 mmHg                                    Estimated PASP: 37.54 mmHg  LVOT  Peak Velocity: 0.89 m/s              Mean Velocity: 0.57 m/s  Peak Gradient: 3.16 mmHg             Mean Gradient: 1.58 mmHg  LVOT Diameter: 2.04 cm               LVOT VTI: 16.15 cm Structures  Left Atrium  LA Dimension: 4.54 cm                        LA Area: 11.81 cm^2  LA/Aorta: 1.25  LA Volume/Index: 44.64 ml /25 m^2  Left Ventricle  Diastolic Dimension: 2.48 cm         Systolic Dimension: 5.57 cm  Septum Diastolic: 4.87 cm            Septum Systolic: 6.29 cm  PW Diastolic: 1.5 cm                 PW Systolic: 4.41 cm                                       FS: 19.1 %  LV EDV/LV EDV Index: 87.32 ml/49 m^2 LV ESV/LV ESV Index: 52.68 ml/29 m^2  EF Calculated: 39.7 %                LV Length: 6.12 cm  LVOT Diameter: 2.04 cm  Right Atrium  RA Systolic Pressure: 10 mmHg  Right Ventricle  Diastolic Dimension: 1.05 cm                                    RV Systolic Pressure: 57.07 mmHg Aorta/ Miscellaneous Aorta  Aortic Root: 3.62 cm  LVOT Diameter: 2.04 cm    Xr Chest (2 Vw)    1.   No evidence of pneumothorax. Resolution of left pleural effusion. Cardiac silhouette remains enlarged. Left lower lobe hazy opacity may represent atelectasis versus pneumonia or infiltrate. COMPARISON: No prior studies available for comparison. DIAGNOSIS:  Post left thoracentesis. Dr. Curly Bradley to read. COMMENTS: I50.43 Acute on chronic combined systolic and diastolic CHF (congestive heart failure) (HCC) ICD10 TECHNIQUE: XR CHEST (2 VW) FINDINGS: Left lower lobe opacity may represent atelectasis versus pneumonia or infiltrate. Interval resolution of left pleural effusion. No evidence of pneumothorax. Cardiac silhouette is enlarged. Visualized soft tissues, and osseous structures are unremarkable. Cta Chest W Wo Contrast    Result Date: 1/26/2021  EXAMINATION:  CHEST CTA WITH CONTRAST (PULMONARY EMBOLISM PROTOCOL) CLINICAL HISTORY:   PE   I50.43 Acute on chronic combined systolic and diastolic CHF (congestive heart failure) ([address] ICD10. Technique:  Spiral axial CT acquisition of the chest from the thoracic inlet to the upper abdomen following IV contrast.  2-D images were reconstructed in the sagittal and coronal planes. 3-D MIPS images were generated in the coronal and axial planes. Images were reviewed on the PACS workstation. All images including the 3-D MIPS were personally archived. Contrast:  IV administration of 100 ml Isovue 300 All CT scans at this facility use dose modulation, iterative reconstruction, and/or weight based dosing when appropriate to reduce radiation dose to as low as reasonably achievable. Comparison:  CTA chest 3/15/2019  RESULT: Limitations: Motion artifact.  Evaluation for thromboembolic disease:      - Right heart chambers:  No thromboembolic disease.      - Main pulmonary arteries:  No thromboembolic disease.      - Lobar pulmonary arteries:  No thromboembolic disease.        - Segmental pulmonary arteries:  No thromboembolic disease.        - Subsegmental pulmonary arteries:  Not well visualized scans at this facility use dose modulation, iterative reconstruction, and/or weight based dosing when appropriate to reduce radiation dose to as low as reasonably achievable. FINDINGS   Liver: Small hypodensities in the right hepatic lobe the largest measuring 8 mm consistent with small cysts. Spleen: Negative    Pancreas: Negative     Kidney: Negative   Adrenal glands are negative. Bowel: Negative    Nodes: No lymphadenopathy. Aorta: Dilated descending thoracic aorta maximum diameter approximately 4 cm adjacent appearance from the CT scans from January 25, 2021. No infrarenal abdominal aortic aneurysm. Peritoneum: No free fluid or free air. The abdominal wall is intact. Pelvis : 9.4 x 6.5 x 6.6 cm cystic mass in the left adnexa. This appears to be a chronic finding this patient was present on an MRI scan that included this area back in April 2016. Bladder catheter in place with decompressed bladder. Bones: No acute osseous abnormalities. Lung bases: Persistent bibasilar effusions not atelectasis left greater than right. PERSISTENT BIBASILAR PLEURAL-PARENCHYMAL CHANGES. NO ACUTE PATHOLOGY IN THE ABDOMEN OR PELVIS. Xr Chest Portable    Result Date: 2/8/2021  EXAMINATION: XR CHEST PORTABLE REASON FOR EXAM: chest pain FINDINGS: Frontal view of the chest reveals cardiomegaly and prominent central pulmonary vasculature consistent with chronic congestion. Findings similar and unchanged from 2/3/2021. Opacification at the left base consistent with residual pneumonitis/atelectasis remains unchanged from previous study 4 days earlier. PNEUMONIA/ATELECTASIS LEFT BASE. CARDIOMEGALY. MILD CHRONIC CENTRAL VASCULAR CONGESTION. NO SIGNIFICANT CHANGE FROM 2/3/2020     Xr Chest Portable    Result Date: 2/4/2021  EXAMINATION: XR CHEST PORTABLE CLINICAL HISTORY: SHORTNESS OF BREATH, CHEST PAIN COMPARISONS: CTA CHEST, JANUARY 25, 2021. CHEST RADIOGRAPHS JANUARY 28, JANUARY 29, 2021.  FINDINGS: Osseous structures intact. Cardiopericardial silhouette enlarged and unchanged. Cephalization pulmonary vasculature. No focal airspace disease. Increased opacity left lower lung obscures left diaphragm. STABLE MARKED CARDIOMEGALY. PROBABLE INTERSTITIAL EDEMA. LEFT LOWER LUNG ATELECTASIS/PNEUMONIA. Xr Chest Portable    Result Date: 1/28/2021  EXAMINATION: CHEST PORTABLE VIEW  CLINICAL HISTORY: Short of breath COMPARISONS: None  FINDINGS: Single  views of the chest is submitted. The cardiac silhouette is enlarged. Pulmonary vascular unremarkable. Right sided trachea. Left lower lobe infiltrate/consolidation, collapse with left sided pleural effusion. LEFT LOWER LOBE INFILTRATE/CONSOLIDATION, COLLAPSE WITH LEFT SIDED PLEURAL EFFUSION    Xr Chest Portable    Result Date: 1/26/2021  EXAMINATION: Portable AP ERECT view of the chest. CLINICAL HISTORY:  SOB , Negative Covid-19 test. DATE: 1/25/2021 5:41 PM COMPARISONS: 7/24/2017 FINDINGS: The heart is moderately enlarged, similar to prior exam.  Prominent interstitial markings, consistent with increasing Central vascular congestion. The costophrenic angles are blunted secondary to pleural effusions bilaterally, left greater than  right. No pneumothorax. There are infiltrates/atelectasis within lung bases, left greater than right. There are moderate degenerative changes in spine. CARDIAC ENLARGEMENT WITH CENTRAL VESSEL CONGESTION AND BILATERAL PLEURAL EFFUSIONS, POSSIBLE CONGESTIVE HEART FAILURE. BIBASILAR INFILTRATES/ATELECTASIS, LEFT GREATER THAN RIGHT. Ir Guided Thoracentesis Pleural    Technically successful ultrasound-guided thoracentesis without immediate complications. HISTORY: Edie Santos is a Female of 80 years age.  DIAGNOSIS: Left pleural effusion COMMENTS: I50.43 Acute on chronic combined systolic and diastolic CHF (congestive heart failure) ([facility]) ICD10 PROCEDURE: Following the discussion of procedure, risks versus benefits, possible complications, informed consent was obtained. Following universal protocol, patient and site verification was performed with a \"timeout\" prior to the procedure. Brief survey of the patient's left hemithorax demonstrate moderate amount of pleural effusion. After selection of an appropriate site for thoracentesis, the thoracic skin was prepped and draped in usual sterile fashion. Under ultrasound guidance, using lidocaine for local anesthesia, a 19-gauge Yueh catheter was inserted in the pleural cavity until effusion was aspirated. Using Vacutainer bottles, 755 mL of clear yellow effusion was drained. The catheter was removed and the aspiration site dressed. The patient tolerated procedure well. No immediate complications were identified. Subsequent chest radiograph demonstrated no evidence of pneumothorax. The patient left the radiology department in stable condition. Radiology nurse monitored patient's  vital signs throughout the procedure which lasted approximately 30 minutes. .     ASSESSMENT AND PLAN  1. CHF with left pleura effusion. Thoracentesis showed atypical cells consistent with mesothelial cells. No malignant cells seen.      Electronically signed by Justin Méndez MD on 2/8/2021 at 2:53 PM

## 2021-02-08 NOTE — SIGNIFICANT EVENT
I was contacted by Dr. Emily Alonso because patient was short of breath and more hypoxic requiring 12 L O2. Patient was already given p.o. Lasix with no improvement. Patient was assessed and noted to have increased lower extremity edema and bilateral lung crackles. X-ray showed flash pulmonary edema. She was given IV Lasix 20 mg which improved patient's symptoms significantly. She diuresed over 1200 mL. Patient's blood pressure has been stable. Nurse was able to lower patient's oxygen to 6 L at the level she was before this episode. Patient was doing well until early in the morning. She woke up wanting to go to the bathroom to move her bowels. She refused to use the bedpan. She became more short of breath and her saturation was 88%. Patient oxygen increased to 10 L to keep her saturation 94%. We will continue monitoring patient and try to wean down her oxygen to 6 L while keeping her saturation above 94% if possible. Patient currently hemodynamically stable and symptoms under control. Recommend IV Lasix in the morning for more diuresis. Nurse to contact cardiology to assess patient in the morning.     Electronically signed by Carlos Alex MD on 2/8/2021 at 4:58 AM

## 2021-02-08 NOTE — PROGRESS NOTES
non-tender, no enlargement of liver or spleen. Extremities:  No significant lower extremity edema or tenderness. Skin:   Intact to general survey, no visualized or palpated problems. Rehabilitation:  Physical therapy: FIMS:  Bed Mobility:      Transfers: Sit to Stand: Stand by assistance  Stand to sit: Stand by assistance  Bed to Chair: Contact guard assistance, Ambulation 1  Surface: carpet  Device: Rolling Walker  Other Apparatus: O2  Assistance: Contact guard assistance  Quality of Gait: decreased clayton heel strike, shortened step length, slow raji, increased hip flx on LLE during swing phase to compensate for foot drop  Gait Deviations: Slow Raji, Increased JAMES  Distance: 50'  Comments: distance not focus, Stairs  # Steps : 8  Stairs Height: 6\"  Rails: Bilateral  Device: No Device  Assistance: Contact guard assistance  Comment: non reciprocal pattern, SOB post stair training, SpO2 93%    FIMS:  ,  , Assessment: Pt limited this session by fatigue and SOB. Frequent RB's utilized with education on proper breathing technique. Pt challenged with gait drills this session and trialing fwd ambulation on // bars with only one UE support. Pt initially apprehensive but agreeable with encouragement. Mild unsteadiness with static standing requiring occ CGA from this PTA. Pt able to maintain static standing without UE support x15\". Seated ther ex utilized to continue to strengthen BLE and improve endurance and overall functional mobility. Occupational therapy: FIMS:   ,  , Assessment: Pt is an 81 y/o female from home alone who presents to [facility] with the above deficits which impact her independence and safety during ADLs. Pt would benefit from OT services to maximize independence and safety during ADLs.     Speech therapy: FIMS:        Lab/X-ray studies reviewed, analyzed and discussed with patient and staff:   Recent Results (from the past 24 hour(s))   Basic Metabolic Panel    Collection Time: 02/08/21  5:29 AM Result Value Ref Range    Sodium 137 135 - 144 mEq/L    Potassium 4.2 3.4 - 4.9 mEq/L    Chloride 100 95 - 107 mEq/L    CO2 28 20 - 31 mEq/L    Anion Gap 9 9 - 15 mEq/L    Glucose 110 (H) 70 - 99 mg/dL    BUN 20 8 - 23 mg/dL    CREATININE 0.84 0.50 - 0.90 mg/dL    GFR Non-African American >60.0 >60    GFR  >60.0 >60    Calcium 8.4 (L) 8.5 - 9.9 mg/dL   CBC    Collection Time: 02/08/21  5:29 AM   Result Value Ref Range    WBC 8.3 4.8 - 10.8 K/uL    RBC 3.81 (L) 4.20 - 5.40 M/uL    Hemoglobin 11.0 (L) 12.0 - 16.0 g/dL    Hematocrit 33.4 (L) 37.0 - 47.0 %    MCV 87.6 82.0 - 100.0 fL    MCH 28.9 27.0 - 31.3 pg    MCHC 33.0 33.0 - 37.0 %    RDW 13.8 11.5 - 14.5 %    Platelets 350 108 - 839 K/uL       Previous extensive, complex labs, notes and diagnostics reviewed and analyzed. ALLERGIES:    Allergies as of 02/02/2021 - Review Complete 02/02/2021   Allergen Reaction Noted    Latex  07/19/2017    Tape [adhesive tape]  06/28/2016      (please also verify by checking MAR)     Today I evaluated this patient for periodic reassessment of medical and functional status. The patient was discussed in detail at the treatment team meeting focusing on current medical issues, progress in therapies, social issues, psychological issues, barriers to progress and strategies to address these barriers, and discharge planning. See the addendum to rehab progress note-as a second progress note in the chart. The patient continues to be high risk for future disability and their medical and rehabilitation prognosis continue to be good and therefore, we will continue the patient's rehabilitation course as planned. The patient's tentative discharge date was set. Patient and family education was discussed. The patient was made aware of the team discussion regarding their progress. Complex Physical Medicine & Rehab Issues Assess & Plan:   1.  Severe abnormality of gait and mobility and impaired self-care and ADL's towards goals, discuss and address social, psychological and medical comorbidities and to address difficulties they may be having progressing in therapy. Patient and family education is in progress. The patient is to follow-up with their family physician after discharge. Complex Active General Medical Issues that complicate care Assess & Plan:     1. Principal Problem:    Impaired mobility and activities of daily living due to CHF [facility] admit 2021  Active Problems:  1. Paroxysmal atrial fibrillation,    Essential hypertension,Acute on chronic combined systolic and diastolic CHF (congestive heart failure)-continue blood signs every shift focusing on heart rate and blood pressure checks, consult hospitalist for backup medical and adjust/add medications (aspirin, Coreg, Cardizem, Lasix, Nitrostat) plan as above consult cardiology titrate IV Lasix recheck chest x-ray wean high flow oxygen  2. Lumbar stenosis with neurogenic claudication  3. Charcot Arleen Tooth muscular atrophy  4. Osteoarthritis of lumbar spine with myelopathy,  Osteoarthritis  5. Severe depression and anxiety -emotional support provided daily, vitamin B12, encourage participation in rehabilitation support group and recreational therapy, adjust/add medications (Celexa Ativan)  6.  Hypothyroidism-titrate Synthroid            Reinaldo Pritchard, D.O., PM&R     Attending    [address]

## 2021-02-08 NOTE — PROGRESS NOTES
[facility] Occupational Therapy      Date: 2021  Patient Name: Susana Dodson        MRN: [id number]  Account: [de-identified]   : 1932  (80 y.o.)  Room: [place] [address]    Chart reviewed, Patient not seen 2° to:    Hold per nursing request due to: patient still on high flow. Patient on hold for the entire day missing all 90 minutes scheduled OT. Leslie Truong RN aware. Will attempt again when able.     Electronically signed by PRUDENCE Johnson on 2021 at 2:39 PM

## 2021-02-08 NOTE — PROGRESS NOTES
Physical Therapy Missed Treatment   Facility/Department: [facility] MED SURG G392/Q988-78    NAME: Ayan Pruitt  Patient Status:   : 1932 (80 y.o.)  MRN: [id number]  Account: [de-identified]  Gender: female        [] Patient Declines PT Treatment            [x] Patient Unavailable:     Pt on hold all day due to being on hi-flow oxygen per Jyoit Almanzar RN. Pt missed 90 minutes of PT this date due to medical issues. Will attempt PT Treatment again at earliest convenience.         Electronically signed by Jordan Thompson PTA on 21 at 12:19 PM EST

## 2021-02-08 NOTE — CONSULTS
BI-FLEX ONE PER DAY) TABS Take by mouth daily    Historical Provider, MD   Calcium Carbonate-Vitamin D (CALTRATE 600+D PO) Take by mouth daily    Historical Provider, MD   Multiple Vitamins-Minerals (CENTRUM SILVER ULTRA WOMENS) TABS Take by mouth daily    Historical Provider, MD   vitamin B-12 (CYANOCOBALAMIN) 1000 MCG tablet Take 1,000 mcg by mouth daily    Historical Provider, MD   KLOR-CON M20 20 MEQ extended release tablet TAKE 1 TABLET DAILY WITH BREAKFAST (MUST CALL OFFICE FOR FOLLOW UP) 7/22/20   Jorge LAKE Holiday, DO   hydrALAZINE (APRESOLINE) 10 MG tablet TAKE 1 TABLET IN THE EVENING. repeat dose IN THE MORNING if systolic >845 6/81/92   Historical Provider, MD   aspirin 81 MG tablet Take 81 mg by mouth daily     Historical Provider, MD   losartan (COZAAR) 50 MG tablet Take 50 mg by mouth 2 times daily  4/27/17   Historical Provider, MD   citalopram (CELEXA) 20 MG tablet Take 20 mg by mouth daily  4/1/16   Historical Provider, MD   SYNTHROID 88 MCG tablet Take 88 mcg by mouth daily  3/19/16   Historical Provider, MD    Scheduled Meds:   furosemide  20 mg Intravenous Daily    pantoprazole  40 mg Oral Once    pantoprazole  40 mg Oral BID AC    budesonide-formoterol  2 puff Inhalation BID    aspirin  81 mg Oral Daily    carvedilol  3.125 mg Oral BID    citalopram  20 mg Oral Daily    dilTIAZem  120 mg Oral Daily    levothyroxine  88 mcg Oral Daily    [Held by provider] losartan  25 mg Oral Daily    potassium chloride  20 mEq Oral Daily with breakfast    rivaroxaban  20 mg Oral Daily     Continuous Infusions:  PRN Meds:.nitroGLYCERIN, acetaminophen **OR** [DISCONTINUED] acetaminophen, polyethylene glycol, promethazine **OR** ondansetron, sodium chloride flush, LORazepam, sodium chloride flush, simethicone  Allergies  is allergic to latex and tape [adhesive tape]. Family History  family history includes Arthritis in her father and mother; High Blood Pressure in her father.   Social History   reports that she has never smoked. She has never used smokeless tobacco. She reports that she does not drink alcohol or use drugs. Review of Systems:  14 systems were reviewed and negative other than [place]    OBJECTIVE  CURRENT VITALS:  height is 5' 3\" (1.6 m) and weight is 143 lb (64.9 kg). Her oral temperature is 99 °F (37.2 °C). Her blood pressure is 101/64 and her pulse is 62. Her respiration is 16 and oxygen saturation is 96%. Temperature Range (24h):Temp: 99 °F (37.2 °C) Temp  Av.2 °F (36.8 °C)  Min: 97.7 °F (36.5 °C)  Max: 99 °F (37.2 °C)  BP Range (20J): Systolic (69GXB), GEI:467 , Min:83 , OGM:555     Diastolic (96BCH), CVX:51, Min:50, Max:80    Pulse Range (24h): Pulse  Av.7  Min: 61  Max: 122  Respiration Range (24h): Resp  Av  Min: 16  Max: 18  Current Pulse Ox (24h):  SpO2: 96 %  Pulse Ox Range (24h):  SpO2  Av.4 %  Min: 86 %  Max: 96 %  Oxygen Amount and Delivery: O2 Flow Rate (L/min): 10 L/min  CONSTITUTIONAL: Alert and oriented times 3, mild shortness of breath and cooperative to examination   SKIN: Skin color, texture, turgor normal. No rashes or lesions. , no bruising  LYMPH: no cervical nodes, no inguinal nodes  HEENT: Head is normocephalic, atraumatic. PERRLA, Mucous membranes are moist.   NECK: Supple, symmetrical, trachea midline, no adenopathy, thyroid symmetric, not enlarged and no tenderness, skin normal.  CHEST/LUNGS: Decreased breath sounds in both lobes, rales noted in both bases, using accessory muscles to help breathe  CARDIOVASCULAR: Heart sounds are normal.  Regular rate and rhythm without murmur, gallop or rub. Normal S1 and S2. Carotid and femoral pulses 2+/4 and equal bilaterally. ABDOMEN: Normal shape. .  Normal bowel sounds. No bruits. Soft, nondistended, no masses or organomegaly. no evidence of hernia. Tenderness: epigastric. RECTAL: deferred, not clinically indicated  NEUROLOGIC: There are no focalizing motor or sensory deficits. CN II-XII are grossly intact. Mahin Granados EXTREMITIES: no cyanosis, no clubbing. 2+ bilateral lower extremity edema.,  Walks with a walker  PYSCH:  Mood, affect and judgement are normal, alert and oriented x 3  LABS:  Recent Labs     02/06/21  0532 02/07/21  0148 02/07/21  0550 02/08/21  0529   WBC 7.6  --  7.4 8.3   HGB 10.4*  --  10.1* 11.0*   HCT 31.5*  --  30.5* 33.4*     --  249 278     --  139 137   K 4.5  --  3.9 4.2     --  104 100   CO2 27  --  24 28   BUN 17  --  17 20   CREATININE 0.73  --  0.61 0.84   CALCIUM 8.4*  --  8.4* 8.4*   NITRU  --  Negative  --   --    COLORU  --  Yellow  --   --    BACTERIA  --  Negative  --   --        RADIOLOGY:  I have personally reviewed the following films:  Reviewed CT scan of the chest on 1/25/2021 and it showed a small hiatal hernia. No CT of the abdomen and pelvis found through the medical records    Thank you for the interesting evaluation. Further recommendations to follow.     Electronically signed by Jessica Lamb MD on 2/8/21 at 9:09 AM EST

## 2021-02-08 NOTE — PROGRESS NOTES
INDIVIDUALIZED OVERALL REHAB PLAN OF CARE  ADDENDUM TO REHAB PROGRESS NOTE-for audit purposes must also refer to this day's clinical note and combine the information      Date: 2021  Patient Name: Ayan Pruitt   Room: G225/B301-69    MRN: [id number]    : 1932  (80 y.o.)  Gender: female       Today 2021 during weekly team meeting, I reviewed the patient Ayan Pruitt in detail with the therapists and nurses involved in patient's care gathering complex physiatric data regarding current medical issues, progress in therapies, factors limiting progress, social issues, psychological issues, ongoing therapeutic plans and discharge planning. Legend:  I= independent Im =Modified independent  S=Supervised SB=stand by MAGANA=set up CG=contact fran Min= minimal Mod=Moderate Max=maximal Max of 2 =maximal assist of 2 people      CURRENT FUNCTIONAL STATUS:    NURSING ISSUES:      Re left pleural effusion. Thoracentesis done 2021. 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 Patient was continent all day. Last stool was on 21. , RN removed the heplock in her rt FA. Nursing will continue to focus on bowel and bladder continence transitioning toward independence by time of discharge. Monitoring post void residuals monitoring for severe constipation and bowel obstruction. Focus on achieving ADL goals with co-treating with OT when possible.     PHYSICAL THERAPY  Bed mobility:  Supine to Sit: Stand by assistance (21 1034)  Sit to Supine: Stand by assistance (21 1034)  Transfers:  Sit to Stand: Stand by assistance (21 0915)  Bed to Chair: Contact guard assistance (21 1307)  Gait:   Device: Rolling Walker (21)  Other Apparatus: O2 (21)  Assistance: Contact guard assistance (21)  Distance: 50' (21)  Quality of Gait: decreased clayton heel strike, shortened step length, slow raji, increased hip flx on LLE during swing phase to compensate for foot drop (02/07/21 0931)  Comments: distance not focus (02/07/21 0931)  Stairs:  # Steps : 8 (02/06/21 1328)  Rails: Bilateral (02/06/21 1328)  Assistance: Contact guard assistance (02/06/21 1328)  Comment: non reciprocal pattern, SOB post stair training, SpO2 93% (02/06/21 1328)  W/C mobility:         OCCUPATIONAL THERAPY  Hand Dominance: Right  ADL  Feeding: Setup (02/05/21 8278)  Grooming: Setup (02/06/21 1113)  UE Bathing: Minimal assistance(Min A to wash lower back) (02/06/21 1113)  LE Bathing: Minimal assistance (02/06/21 1113)  UE Dressing: Setup (02/06/21 1113)  LE Dressing: Minimal assistance (02/06/21 1113)  Toileting: Stand by assistance (02/06/21 1113)  Additional Comments: Pt took shower, completed dressing, and grooming seated in chair (02/06/21 1113)  Toilet Transfers  Toilet - Technique: Ambulating (02/06/21 1116)  Equipment Used: Grab bars (02/06/21 1116)  Toilet Transfer: Contact guard assistance (02/06/21 1116)  Toilet Transfers Comments: Patient used a ww to ambulate to the bathroom. (02/06/21 1116)     [address] - Transfer From: [facility] (02/06/21 1116)  Shower - Transfer Type: To and From (02/06/21 1116)  Shower - Transfer To: Shower seat with back (02/06/21 1116)  Shower - Technique: Ambulating (02/06/21 1116)  Shower Transfers: Contact Guard (02/06/21 1116)      SPEECH THERAPY               Diet/Swallow:                           COGNITION  OT: Cognition Comment: Comp: Supervision, Express: Independent, Social: Independent, Prob: Min A, Mem: N/A  SP:            THERAPY, MEDICAL AND NURSING COORDINATION:    [x]  Pain medication before therapies     []  Check orthostatic BP      [x]  Ambulate to the bathroom in room    [x]  Add scheduled rest beaks     []  In room therapies      Discharge date set for:              2/11/21      Home with:   alone  with help from   Son and dtr            And:      Home Health Care: [x]  PT    []  OT    []  ST   [x]  Aide   []  SW    [x]  RN                    Outpatient Therapy:  []  PT    []  OT    []  ST   []  Rehab Psych                 Equipment:  WW      At D/C their function is goaled at:   PT:Long term goal 1: indep bed mobility  Long term goal 2: indep bed transfers  Long term goal 3: indep gait with ww or without device 50 feet -  feet  Long term goal 4: SBA 4 stairs  Long term goal 5: 20/56 for Bernard balance  OT:Eating  Assistance Needed: Setup or clean-up assistance  CARE Score: 5  Discharge Goal: Independent, Oral Hygiene  Assistance Needed: Supervision or touching assistance  CARE Score: 4  Discharge Goal: Independent, [address]  Reason if not Attempted: Not applicable(pt did not need to go)  CARE Score: 9  Discharge Goal: Independent, Shower/Bathe Self  Assistance Needed: Partial/moderate assistance  CARE Score: 3  Discharge Goal: Independent  Upper Body Dressing  Reason if not Attempted: Not applicable(gown)  CARE Score: 9  Discharge Goal: Independent, Lower Body Dressing  Assistance Needed: Partial/moderate assistance  CARE Score: 3  Discharge Goal: Independent, Putting On/Taking Off Footwear  Assistance Needed: Partial/moderate assistance  CARE Score: 3  Discharge Goal: Independent, Toilet Transfer  Assistance Needed: Partial/moderate assistance  CARE Score: 3  Discharge Goal: Independent  SP:               From a cognitive standpoint they will need:        24 hr supervision  --progress to occasional           Significant problems/ barriers to functional progress include: Pt is at a high risk for functional loss,    [x]  Acute infection/UTI    []  Low BP's     []  COPD flare-up   []  Uncontrolled blood sugar     []  Progressive anemia         []  Severe pain exacerbation     []  Impaired mental status    []  Urinary incontinence    []  Bowel incontinence           Plan to correct barriers to functional progress:    Add scheduled rest breaks, control pain by using ice Lidoderm rest and massage as well as pain medications prior to therapy. Based on a comprehensive evaluation of the above, the individualized therapy and Discharge plan will be:    -Times stated are an average that will be varied based on the patient's daily need. PT  1 1/2  hrs/day 5-7 days per week           OT  1 1/2hrs per day 5-7 days per week         Estimated LOS 2 week(s)    - Overall functional prognosis:     [x]  Good    []  Fair    []  Poor -Medical Prognosis:   [x]  Good    []  Fair    []  Poor    This patient was made aware of the discussion of Plan of Care, their projected dicharge date and their projected function at discharge.        Paul Hong, DO

## 2021-02-09 ENCOUNTER — HOSPITAL ENCOUNTER (INPATIENT)
Age: 86
LOS: 10 days | Discharge: NURSING, REHAB OR LONG-TERM CARE | DRG: 291 | End: 2021-02-19
Attending: FAMILY MEDICINE | Admitting: FAMILY MEDICINE
Payer: MEDICARE

## 2021-02-09 VITALS
OXYGEN SATURATION: 94 % | SYSTOLIC BLOOD PRESSURE: 121 MMHG | TEMPERATURE: 98 F | HEART RATE: 76 BPM | RESPIRATION RATE: 19 BRPM | WEIGHT: 143 LBS | HEIGHT: 63 IN | DIASTOLIC BLOOD PRESSURE: 86 MMHG | BODY MASS INDEX: 25.34 KG/M2

## 2021-02-09 PROBLEM — I50.43 ACUTE ON CHRONIC COMBINED SYSTOLIC (CONGESTIVE) AND DIASTOLIC (CONGESTIVE) HEART FAILURE (HCC): Status: ACTIVE | Noted: 2021-02-09

## 2021-02-09 LAB
ANION GAP SERPL CALCULATED.3IONS-SCNC: 12 MEQ/L (ref 9–15)
BUN BLDV-MCNC: 16 MG/DL (ref 8–23)
CALCIUM SERPL-MCNC: 8.4 MG/DL (ref 8.5–9.9)
CHLORIDE BLD-SCNC: 103 MEQ/L (ref 95–107)
CO2: 25 MEQ/L (ref 20–31)
CREAT SERPL-MCNC: 0.68 MG/DL (ref 0.5–0.9)
GFR AFRICAN AMERICAN: >60
GFR NON-AFRICAN AMERICAN: >60
GLUCOSE BLD-MCNC: 116 MG/DL (ref 70–99)
HCT VFR BLD CALC: 31.8 % (ref 37–47)
HEMOGLOBIN: 10.5 G/DL (ref 12–16)
IRON SATURATION: 13 % (ref 11–46)
IRON: 25 UG/DL (ref 37–145)
MCH RBC QN AUTO: 29 PG (ref 27–31.3)
MCHC RBC AUTO-ENTMCNC: 33 % (ref 33–37)
MCV RBC AUTO: 87.8 FL (ref 82–100)
ORGANISM: ABNORMAL
PDW BLD-RTO: 14.1 % (ref 11.5–14.5)
PLATELET # BLD: 278 K/UL (ref 130–400)
POTASSIUM SERPL-SCNC: 3.8 MEQ/L (ref 3.4–4.9)
RBC # BLD: 3.62 M/UL (ref 4.2–5.4)
SARS-COV-2, NAAT: NOT DETECTED
SODIUM BLD-SCNC: 140 MEQ/L (ref 135–144)
TOTAL IRON BINDING CAPACITY: 200 UG/DL (ref 178–450)
URINE CULTURE, ROUTINE: ABNORMAL
WBC # BLD: 9.8 K/UL (ref 4.8–10.8)

## 2021-02-09 PROCEDURE — 99232 SBSQ HOSP IP/OBS MODERATE 35: CPT | Performed by: INTERNAL MEDICINE

## 2021-02-09 PROCEDURE — 94761 N-INVAS EAR/PLS OXIMETRY MLT: CPT

## 2021-02-09 PROCEDURE — 97110 THERAPEUTIC EXERCISES: CPT

## 2021-02-09 PROCEDURE — U0002 COVID-19 LAB TEST NON-CDC: HCPCS

## 2021-02-09 PROCEDURE — 6370000000 HC RX 637 (ALT 250 FOR IP): Performed by: PHYSICIAN ASSISTANT

## 2021-02-09 PROCEDURE — 2700000000 HC OXYGEN THERAPY PER DAY

## 2021-02-09 PROCEDURE — 97535 SELF CARE MNGMENT TRAINING: CPT

## 2021-02-09 PROCEDURE — 85027 COMPLETE CBC AUTOMATED: CPT

## 2021-02-09 PROCEDURE — 99232 SBSQ HOSP IP/OBS MODERATE 35: CPT | Performed by: PHYSICAL MEDICINE & REHABILITATION

## 2021-02-09 PROCEDURE — 6360000002 HC RX W HCPCS: Performed by: INTERNAL MEDICINE

## 2021-02-09 PROCEDURE — 94640 AIRWAY INHALATION TREATMENT: CPT

## 2021-02-09 PROCEDURE — 2580000003 HC RX 258: Performed by: FAMILY MEDICINE

## 2021-02-09 PROCEDURE — 83540 ASSAY OF IRON: CPT

## 2021-02-09 PROCEDURE — 83550 IRON BINDING TEST: CPT

## 2021-02-09 PROCEDURE — 97530 THERAPEUTIC ACTIVITIES: CPT

## 2021-02-09 PROCEDURE — 6370000000 HC RX 637 (ALT 250 FOR IP): Performed by: PHYSICAL MEDICINE & REHABILITATION

## 2021-02-09 PROCEDURE — 36415 COLL VENOUS BLD VENIPUNCTURE: CPT

## 2021-02-09 PROCEDURE — 80048 BASIC METABOLIC PNL TOTAL CA: CPT

## 2021-02-09 PROCEDURE — 2060000000 HC ICU INTERMEDIATE R&B

## 2021-02-09 PROCEDURE — 99222 1ST HOSP IP/OBS MODERATE 55: CPT | Performed by: UROLOGY

## 2021-02-09 PROCEDURE — 6370000000 HC RX 637 (ALT 250 FOR IP): Performed by: INTERNAL MEDICINE

## 2021-02-09 RX ORDER — FUROSEMIDE 10 MG/ML
20 INJECTION INTRAMUSCULAR; INTRAVENOUS ONCE
Status: COMPLETED | OUTPATIENT
Start: 2021-02-09 | End: 2021-02-09

## 2021-02-09 RX ORDER — PROMETHAZINE HYDROCHLORIDE 12.5 MG/1
12.5 TABLET ORAL EVERY 6 HOURS PRN
Status: DISCONTINUED | OUTPATIENT
Start: 2021-02-09 | End: 2021-02-19 | Stop reason: HOSPADM

## 2021-02-09 RX ORDER — CARVEDILOL 3.12 MG/1
3.12 TABLET ORAL 2 TIMES DAILY
Status: CANCELLED | OUTPATIENT
Start: 2021-02-09

## 2021-02-09 RX ORDER — NITROGLYCERIN 0.4 MG/1
0.4 TABLET SUBLINGUAL EVERY 5 MIN PRN
Status: CANCELLED | OUTPATIENT
Start: 2021-02-09

## 2021-02-09 RX ORDER — POLYETHYLENE GLYCOL 3350 17 G/17G
17 POWDER, FOR SOLUTION ORAL DAILY PRN
Status: CANCELLED | OUTPATIENT
Start: 2021-02-09

## 2021-02-09 RX ORDER — ASPIRIN 81 MG/1
81 TABLET, CHEWABLE ORAL DAILY
Status: CANCELLED | OUTPATIENT
Start: 2021-02-10

## 2021-02-09 RX ORDER — ACETAMINOPHEN 650 MG/1
650 SUPPOSITORY RECTAL EVERY 6 HOURS PRN
Status: DISCONTINUED | OUTPATIENT
Start: 2021-02-09 | End: 2021-02-19 | Stop reason: HOSPADM

## 2021-02-09 RX ORDER — CITALOPRAM 20 MG/1
20 TABLET ORAL DAILY
Status: CANCELLED | OUTPATIENT
Start: 2021-02-10

## 2021-02-09 RX ORDER — ONDANSETRON 2 MG/ML
4 INJECTION INTRAMUSCULAR; INTRAVENOUS EVERY 6 HOURS PRN
Status: CANCELLED | OUTPATIENT
Start: 2021-02-09

## 2021-02-09 RX ORDER — FUROSEMIDE 10 MG/ML
20 INJECTION INTRAMUSCULAR; INTRAVENOUS 2 TIMES DAILY
Status: DISCONTINUED | OUTPATIENT
Start: 2021-02-10 | End: 2021-02-09 | Stop reason: HOSPADM

## 2021-02-09 RX ORDER — FUROSEMIDE 10 MG/ML
20 INJECTION INTRAMUSCULAR; INTRAVENOUS 2 TIMES DAILY
Status: DISCONTINUED | OUTPATIENT
Start: 2021-02-09 | End: 2021-02-11

## 2021-02-09 RX ORDER — BUDESONIDE AND FORMOTEROL FUMARATE DIHYDRATE 160; 4.5 UG/1; UG/1
2 AEROSOL RESPIRATORY (INHALATION) 2 TIMES DAILY
Status: CANCELLED | OUTPATIENT
Start: 2021-02-10

## 2021-02-09 RX ORDER — SODIUM CHLORIDE 0.9 % (FLUSH) 0.9 %
10 SYRINGE (ML) INJECTION PRN
Status: DISCONTINUED | OUTPATIENT
Start: 2021-02-09 | End: 2021-02-19 | Stop reason: HOSPADM

## 2021-02-09 RX ORDER — POTASSIUM CHLORIDE 20 MEQ/1
20 TABLET, EXTENDED RELEASE ORAL
Status: CANCELLED | OUTPATIENT
Start: 2021-02-10

## 2021-02-09 RX ORDER — POLYETHYLENE GLYCOL 3350 17 G/17G
17 POWDER, FOR SOLUTION ORAL DAILY PRN
Status: DISCONTINUED | OUTPATIENT
Start: 2021-02-09 | End: 2021-02-19 | Stop reason: HOSPADM

## 2021-02-09 RX ORDER — SODIUM CHLORIDE 0.9 % (FLUSH) 0.9 %
10 SYRINGE (ML) INJECTION EVERY 12 HOURS SCHEDULED
Status: DISCONTINUED | OUTPATIENT
Start: 2021-02-09 | End: 2021-02-19 | Stop reason: HOSPADM

## 2021-02-09 RX ORDER — SIMETHICONE 80 MG
80 TABLET,CHEWABLE ORAL EVERY 6 HOURS PRN
Status: CANCELLED | OUTPATIENT
Start: 2021-02-09

## 2021-02-09 RX ORDER — ONDANSETRON 2 MG/ML
4 INJECTION INTRAMUSCULAR; INTRAVENOUS EVERY 6 HOURS PRN
Status: DISCONTINUED | OUTPATIENT
Start: 2021-02-09 | End: 2021-02-19 | Stop reason: HOSPADM

## 2021-02-09 RX ORDER — PANTOPRAZOLE SODIUM 40 MG/1
40 TABLET, DELAYED RELEASE ORAL
Status: CANCELLED | OUTPATIENT
Start: 2021-02-10

## 2021-02-09 RX ORDER — DILTIAZEM HYDROCHLORIDE 120 MG/1
120 CAPSULE, COATED, EXTENDED RELEASE ORAL DAILY
Status: CANCELLED | OUTPATIENT
Start: 2021-02-10

## 2021-02-09 RX ORDER — LORAZEPAM 0.5 MG/1
0.5 TABLET ORAL EVERY 6 HOURS PRN
Status: CANCELLED | OUTPATIENT
Start: 2021-02-09

## 2021-02-09 RX ORDER — FUROSEMIDE 10 MG/ML
20 INJECTION INTRAMUSCULAR; INTRAVENOUS 2 TIMES DAILY
Status: CANCELLED | OUTPATIENT
Start: 2021-02-10

## 2021-02-09 RX ORDER — ACETAMINOPHEN 325 MG/1
650 TABLET ORAL EVERY 6 HOURS PRN
Status: DISCONTINUED | OUTPATIENT
Start: 2021-02-09 | End: 2021-02-19 | Stop reason: HOSPADM

## 2021-02-09 RX ORDER — PROMETHAZINE HYDROCHLORIDE 12.5 MG/1
12.5 TABLET ORAL EVERY 6 HOURS PRN
Status: CANCELLED | OUTPATIENT
Start: 2021-02-09

## 2021-02-09 RX ORDER — ACETAMINOPHEN 325 MG/1
650 TABLET ORAL EVERY 6 HOURS PRN
Status: CANCELLED | OUTPATIENT
Start: 2021-02-09

## 2021-02-09 RX ORDER — SODIUM CHLORIDE 0.9 % (FLUSH) 0.9 %
10 SYRINGE (ML) INJECTION PRN
Status: CANCELLED | OUTPATIENT
Start: 2021-02-09

## 2021-02-09 RX ORDER — LEVOTHYROXINE SODIUM 88 UG/1
88 TABLET ORAL DAILY
Status: CANCELLED | OUTPATIENT
Start: 2021-02-10

## 2021-02-09 RX ADMIN — RIVAROXABAN 20 MG: 20 TABLET, FILM COATED ORAL at 17:31

## 2021-02-09 RX ADMIN — ACETAMINOPHEN 650 MG: 325 TABLET ORAL at 16:25

## 2021-02-09 RX ADMIN — CITALOPRAM HYDROBROMIDE 20 MG: 20 TABLET ORAL at 08:38

## 2021-02-09 RX ADMIN — FUROSEMIDE 20 MG: 10 INJECTION, SOLUTION INTRAVENOUS at 13:20

## 2021-02-09 RX ADMIN — CARVEDILOL 3.12 MG: 3.12 TABLET, FILM COATED ORAL at 19:43

## 2021-02-09 RX ADMIN — ASPIRIN 81 MG: 81 TABLET, CHEWABLE ORAL at 08:38

## 2021-02-09 RX ADMIN — BUDESONIDE AND FORMOTEROL FUMARATE DIHYDRATE 2 PUFF: 160; 4.5 AEROSOL RESPIRATORY (INHALATION) at 17:40

## 2021-02-09 RX ADMIN — BUDESONIDE AND FORMOTEROL FUMARATE DIHYDRATE 2 PUFF: 160; 4.5 AEROSOL RESPIRATORY (INHALATION) at 04:19

## 2021-02-09 RX ADMIN — FUROSEMIDE 20 MG: 10 INJECTION, SOLUTION INTRAVENOUS at 16:09

## 2021-02-09 RX ADMIN — PANTOPRAZOLE SODIUM 40 MG: 40 TABLET, DELAYED RELEASE ORAL at 16:05

## 2021-02-09 RX ADMIN — LEVOTHYROXINE SODIUM 88 MCG: 88 TABLET ORAL at 06:26

## 2021-02-09 RX ADMIN — POTASSIUM CHLORIDE 20 MEQ: 20 TABLET, EXTENDED RELEASE ORAL at 08:39

## 2021-02-09 RX ADMIN — Medication 10 ML: at 21:30

## 2021-02-09 RX ADMIN — PANTOPRAZOLE SODIUM 40 MG: 40 TABLET, DELAYED RELEASE ORAL at 06:27

## 2021-02-09 ASSESSMENT — PAIN SCALES - GENERAL
PAINLEVEL_OUTOF10: 3
PAINLEVEL_OUTOF10: 0

## 2021-02-09 ASSESSMENT — ENCOUNTER SYMPTOMS
COLOR CHANGE: 0
VOMITING: 0
CHEST TIGHTNESS: 0
NAUSEA: 0
SHORTNESS OF BREATH: 0

## 2021-02-09 ASSESSMENT — PAIN DESCRIPTION - LOCATION: LOCATION: CHEST

## 2021-02-09 NOTE — PROGRESS NOTES
Physical Therapy Rehab Treatment Note  [facility]Department[facility]  [facility]       NAME: Vinay Marie  : 1932 (80 y.o.)  MRN: [id number]  CODE STATUS: Full Code    Date of Service: 2021  Chart Reviewed: Yes  Family / Caregiver Present: No  General Comment  Comments: agreeable to tx    Restrictions:  Restrictions/Precautions: Fall Risk       SUBJECTIVE: Subjective: \"i get exercise now? \"  Pain Screening  Patient Currently in Pain: Denies       Post Treatment Pain Screenin  Pain Assessment  Pain Assessment: 0-10  Pain Level: 0    OBJECTIVE:     Bed mobility: sba supine to sit. Seated edge of bed therex. Neuro: static standing and sit to stands x 5 at ww. Standing fwd/bwd steps at ww. ASSESSMENT/COMMENTS:pt seen bedside due to being on hi-arianna oxygen at 8 liters. Pt agreeable to have exercises. Pt able to stand and take a few steps fwd/bwd but became very short of breath and began to have chest discomfort. sats down to 80% with standing activity. Able to recover seated and bring to 94%. Pt frustrated at not being able to leave the room. Frequent rest breaks needed for pt to recover and participate. PLAN OF CARE/Safety: ongoing.           Therapy Time:   Individual   Time In 1000   Time Out 1030   Minutes 30     Minutes:30      Transfer/Bed mobility trainin      Gait trainin      Neuro re education:10     Therapeutic ex:15      [facility] NELLI CISNEROS PTA, 21 at 12:59 PM

## 2021-02-09 NOTE — PROGRESS NOTES
Occupational Therapy  Facility/Department: [facility]  Daily Treatment Note  NAME: Susana Dodson  : 1932  MRN: [id number]    Date of Service: 2021    Discharge Recommendations:  Continue to assess pending progress       Assessment      Activity Tolerance  Activity Tolerance: Patient Tolerated treatment well  Activity Tolerance: 8L O2  Safety Devices  Safety Devices in place: Yes  Type of devices: All fall risk precautions in place         Patient Diagnosis(es): There were no encounter diagnoses. has a past medical history of Ascending aortic aneurysm ([address] Charcot Arleen Tooth muscular atrophy, Depression, Descending aortic aneurysm ([address] Essential hypertension, Headache, HTN (hypertension), Impaired mobility and activities of daily living, Lumbar stenosis with neurogenic claudication, Myelopathy ([address] and Osteoarthritis. has a past surgical history that includes joint replacement and thoracentesis (Left, 2021). Restrictions  Restrictions/Precautions  Restrictions/Precautions: Fall Risk     Subjective   General  Chart Reviewed: Yes  Patient assessed for rehabilitation services?: Yes  Family / Caregiver Present: No  Referring Practitioner: Dr. Erinn Garvey  Diagnosis: Impaired mobility and ADLs d/t CHF exacerbation    Subjective  Subjective: \"Exercise? \"    Pain Assessment  Pain Location: Chest  Pain Orientation: Mid  Pre Treatment Pain Screening  Comments / Details: Patient unable to rate pain but did indicate a little with her hand. Orientation  Orientation  Overall Orientation Status: Within Functional Limits     Objective      Therapist called interpretation line  The interpretor was named Shanta with a interpretor number of [id number]. Patient repeatedly stated \"no understand\" when the interpretor was attempting to translate. Therapist ended  services 2° patient unable to understand.     Patient engaged in B UE ROM and strengthening to increase I with ADL's and

## 2021-02-09 NOTE — PROGRESS NOTES
Pt son Jelena Carrizales on his way to visit his mother per our phone conversation. I will explain the transfer process and what has happened to require a different level of care. Electronically signed by Ariana Purdy RN on 2/9/2021 at 4:21 PM    Pt son Jelena Carrizales was updated on current status, will update him when pt has a room on 1W. Electronically signed by Ariana Purdy RN on 2/9/2021 at 5:52 PM    Called pt son to update w/new room 170 on the first floor.  Electronically signed by Ariana Purdy RN on 2/9/2021 at 7:47 PM

## 2021-02-09 NOTE — CONSULTS
[facility] ATTENDING INPATIENT  CONSULTATION NOTE                                                                                                                                                                                                Reason for Consult  Urinary retention    History of Present Illness  68-year-old female admitted for shortness of breath worked up by cardiology  Had a Zeng catheter placed for reasons unknown      Urologic Review of Systems/Symptoms  Other Urologic: Patient is a poor historian    Review of Systems    All 14 categories of Review of Systems otherwise reviewed no other findings reported. Past Medical History:   Diagnosis Date    Ascending aortic aneurysm ([facility] [address] 6/23/2017    Charcot Arleen Tooth muscular atrophy     Depression     Descending aortic aneurysm ([facility] [address] 6/23/2017    Essential hypertension 2/16/2018    Headache     HTN (hypertension)     Impaired mobility and activities of daily living     Lumbar stenosis with neurogenic claudication     Myelopathy ([facility] [address]     Osteoarthritis      Past Surgical History:   Procedure Laterality Date    JOINT REPLACEMENT      THORACENTESIS Left 01/29/2021    total of 755 cc removed per Dr Jenene Councilman specimen sent to lab     Social History     Socioeconomic History    Marital status:      Spouse name: None    Number of children: None    Years of education: None    Highest education level: None   Occupational History    None   Social Needs    Financial resource strain: None    Food insecurity     Worry: None     Inability: None    Transportation needs     Medical: None     Non-medical: None   Tobacco Use    Smoking status: Never Smoker    Smokeless tobacco: Never Used   Substance and Sexual Activity    Alcohol use: No     Alcohol/week: 0.0 standard drinks    Drug use: No    Sexual activity: None   Lifestyle    Physical activity     Days per week: 0 days     Minutes per session: 0 min    Stress:  Only a little   Relationships    Social connections     Talks on phone: More than three times a week     Gets together: More than three times a week     Attends [employer] service: 1 to 4 times per year     Active member of club or organization: No     Attends meetings of clubs or organizations: Never     Relationship status:      Intimate partner violence     Fear of current or ex partner: No     Emotionally abused: No     Physically abused: No     Forced sexual activity: No   Other Topics Concern    None   Social History Narrative    Lives With: Alone, son lives down the street, dtr is in the area    Has a son in the area    Type of Home: [address]: One level    Home Access: Stairs to enter with rails- Number of Steps: 2- Rails: Both    Bathroom Shower/Tub: Tub/Shower unit, Bathroom Equipment: Grab bars in shower, Shower chair    Home Equipment: Rolling walker, Cane(Pt infrequemtly uses DME for ambulation and prefers to furniture walk in home)    ADL Assistance: Independent, [address]: [address] [facility] [address] [facility] Responsibilities: Yes    Ambulation Assistance: Independent, Transfer Assistance: Independent    Additional Comments: Son stops over 2 times daily         Family History   Problem Relation Age of Onset    Arthritis Mother     Arthritis Father     High Blood Pressure Father      Current Facility-Administered Medications   Medication Dose Route Frequency Provider Last Rate Last Admin    furosemide (LASIX) injection 20 mg  20 mg Intravenous Daily Diane Gomez MD   20 mg at 02/09/21 1320    pantoprazole (PROTONIX) tablet 40 mg  40 mg Oral Once Ajit Ca MD        nitroGLYCERIN (NITROSTAT) SL tablet 0.4 mg  0.4 mg Sublingual Q5 Min PRN Rola Velarde MD   0.4 mg at 02/07/21 2052    pantoprazole (PROTONIX) tablet 40 mg  40 mg Oral BID AC Rola Velarde MD   40 mg at 02/09/21 0627    budesonide-formoterol (SYMBICORT) 160-4.5 MCG/ACT inhaler 2 puff  2 puff Inhalation BID Adeola Meza MD   2 puff at 02/09/21 0419    acetaminophen (TYLENOL) tablet 650 mg  650 mg Oral Q6H PRN [facility], DO        polyethylene glycol (GLYCOLAX) packet 17 g  17 g Oral Daily PRN [facility], DO   17 g at 02/05/21 4587    promethazine (PHENERGAN) tablet 12.5 mg  12.5 mg Oral Q6H PRN [facility], DO        Or    ondansetron (ZOFRAN) injection 4 mg  4 mg Intravenous Q6H PRN [facility], DO        sodium chloride flush 0.9 % injection 10 mL  10 mL Intravenous PRN [facility], DO   10 mL at 02/08/21 1313    aspirin chewable tablet 81 mg  81 mg Oral Daily [facility], DO   81 mg at 02/09/21 5446    carvedilol (COREG) tablet 3.125 mg  3.125 mg Oral BID ASHLEY Mills   3.125 mg at 02/08/21 2251    citalopram (CELEXA) tablet 20 mg  20 mg Oral Daily [facility], DO   20 mg at 02/09/21 3118    dilTIAZem (CARDIZEM CD) extended release capsule 120 mg  120 mg Oral Daily [facility], DO   120 mg at 02/08/21 1566    levothyroxine (SYNTHROID) tablet 88 mcg  88 mcg Oral Daily [facility], DO   88 mcg at 02/09/21 5063    LORazepam (ATIVAN) tablet 0.5 mg  0.5 mg Oral Q6H PRN [facility], DO   0.5 mg at 02/03/21 1321    [Held by provider] losartan (COZAAR) tablet 25 mg  25 mg Oral Daily [facility], DO   25 mg at 02/03/21 5616    potassium chloride (KLOR-CON M) extended release tablet 20 mEq  20 mEq Oral Daily with breakfast [facility], DO   20 mEq at 02/09/21 1476    rivaroxaban (XARELTO) tablet 20 mg  20 mg Oral Daily [facility], DO   20 mg at 02/08/21 1710    sodium chloride flush 0.9 % injection 10 mL  10 mL Intravenous ONCE PRN [facility], DO        simethicone (MYLICON) chewable tablet 80 mg  80 mg Oral Q6H PRN Fouzia Shane MD   80 mg at 02/03/21 5060     Latex and Tape [adhesive tape]  All reviewed and verified by Dr Boston Nayak on today's visit    No results found for: PSA, RAFI  [unfilled]    Physical Exam  Vitals:    02/08/21 2300 02/09/21 0419 02/09/21 0837 02/09/21 0840   BP:   99/64 99/64   Pulse:   78 78   Resp:    18   Temp:    98 °F (36.7 °C)   TempSrc:       SpO2: 94% 96%  91%   Weight:       Height:         Constitutional: Patient is a poor historian speaks mostly [place]. Urologic Exam  Zeng catheter draining clear urine urine. Assessment  Urinary retention versus Zeng catheter placement Place for medical management  Plan  Okay to remove Zeng give patient a trial of void  Rehab unit protocol per removal of Zeng and check postvoid residuals  We will follow with you  Greater 50% of 50 minutes spent evaluating patient face to face. Cherelle Hernandez MD FACS    Please note this report has been partially produced using speech recognition software  And may cause contain errors related to that system including grammar, punctuation and spelling as well as words and phrases that may seem inappropriate. If there are questions or concerns please feel free to contact me to clarify.

## 2021-02-09 NOTE — PROGRESS NOTES
Assumed care for this patient at [phone]. Pt has slept comfortably all night. Respiratory bumped pt down to 6L high flow and in the day he patient should be on O2 NC 6L an no high flow if possible. Dr Kim Shanks wants her to be 80 and above . Pt is SOB with exertion. Urology consult for today. Right hand hep lock good through 2/11. ASA and Xalralto anti coagulants. Pt has a taylor draining clear yellow urine 1200ml. Call light with n reach bed alarm on. Monitoring for safety and needs.

## 2021-02-09 NOTE — PROGRESS NOTES
INPATIENT PROGRESS NOTES    PATIENT NAME: Radha Larsen  MRN: [id number]  SERVICE DATE:  February 8, 2021   SERVICE TIME:  8:16 PM      PRIMARY SERVICE: Pulmonary Disease    CHIEF COMPLAIN: Shortness of breath and chest pain      INTERVAL HPI: Patient seen and examined at bedside, Interval Notes, orders reviewed. Nursing notes noted  Patient become more shortness of breath and hypoxic requiring 12 L oxygen. Chest x-ray shows flash pulmonary edema. She was given IV Lasix which improved her symptoms significantly and diuresed over 1200 cc. Oxygen was decreased to 8 L O2 via nasal cannula. When I saw the patient she said she is feeling much better. On 8 L saturation 95%. She has a complaint of chest pain on left side anteriorly. No nausea vomiting diarrhea. OBJECTIVE    Body mass index is 25.33 kg/m². PHYSICAL EXAM:  Vitals:  /64   Pulse 62   Temp 99 °F (37.2 °C) (Oral)   Resp 16   Ht 5' 3\" (1.6 m)   Wt 143 lb (64.9 kg)   SpO2 95%   BMI 25.33 kg/m²   General: No nausea vomiting diarrhea or alert, awake . comfortable in bed, No distress. Head: Atraumatic , Normocephalic   Eyes: PERRL. No sclera icterus. No conjunctival injection. No discharge   ENT: No nasal  discharge. Pharynx clear. lips, teeth, mucosa and gums are normal, tongue protrudes in the midline  Neck:  Trachea midline. No thyromegaly, no JVD, No cervical adenopathy. Chest : Bilaterally symmetrical ,Normal effort,  No accessory muscle use  Lung : Diminished breath sound at left base. Bibasilar Rales. No wheezing. No rhonchi. Heart[de-identified] Normal  rate. Regular rhythm. No mumur ,  Rub or gallop  ABD: Non-tender. Non-distended. No masses. No organmegaly. Normal bowel sounds. No hernia.   Ext : No Pitting both leg , No Cyanosis No clubbing  Neuro: no focal weakness          DATA:   Recent Labs     02/07/21  0550 02/08/21  0529   WBC 7.4 8.3   HGB 10.1* 11.0*   HCT 30.5* 33.4*   MCV 88.1 87.6    278     Recent Labs Germania Kellogg MD Procedure Type of Study  TTE procedure:ECHO COMPLETE 2D W/DOP W/COLOR. Procedure Date Date: 01/27/2021 Start: 10:26 AM Study Location: Portable Technical Quality: Adequate visualization Indications:Congestive heart failure. Patient Status: Routine Height: 64 inches Weight: 165 pounds BSA: 1.8 m^2 BMI: 28.32 kg/m^2 BP: 108/77 mmHg  Conclusions  Summary  Mitral annular calcification is present. 1+ MR  Left ventricular ejection fraction is visually estimated at 40%. E/A flow reversal noted. Suggestive of diastolic dysfunction. Signature  ----------------------------------------------------------------  Electronically signed by Germania Kellogg MD(Interpreting  physician) on 01/27/2021 11:37 AM  ----------------------------------------------------------------  Findings Left Ventricle Left ventricular ejection fraction is visually estimated at 40%. E/A flow reversal noted. Suggestive of diastolic dysfunction. Right Ventricle Normal right ventricle structure and function. Normal right ventricle systolic pressure. Left Atrium Moderately dilated left atrium. Right Atrium Moderately enlarged right atrium size. Mitral Valve Mitral annular calcification is present. 1+ MR Tricuspid Valve Normal tricuspid valve structure and function. 1-2+ TR RVSP 38 mmHg Aortic Valve Aortic valve leaflets are moderately thickened. No AS No AR Pulmonic Valve The pulmonic valve was not well visualized . Pericardial Effusion No evidence of pericardial effusion. Pleural Effusion No evidence of pleural effusion. Aorta \ Miscellaneous The aorta is within normal limits. M-Mode Measurements (cm)  LVIDd: 4.39 cm                         LVIDs: 3.55 cm  IVSd: 1.43 cm                          IVSs: 1.55 cm  LVPWd: 1.5 cm                          LVPWs: 1.48 cm  Rt. Vent.  Dimension: 3.74 cm           AO Root Dimension: 3.62 cm                                         ACS: 1.25 cm                                         LA: 4.54 cm LVOT: 2.04 cm Doppler Measurements:  AV Velocity:0.02 m/s                    MV Peak E-Wave: 0.85 m/s  AV Peak Gradient: 10.44 mmHg            MV Peak A-Wave: 0.87 m/s  AV Mean Gradient: 6.01 mmHg  AV Area (Continuity):1.83 cm^2  TR Velocity:2.62 m/s                    Estimated RAP:10 mmHg  TR Gradient:27.54 mmHg                  RVSP:37.54 mmHg Valves  Mitral Valve  Peak E-Wave: 0.85 m/s                 Peak A-Wave: 0.87 m/s                                        E/A Ratio: 0.97                                        Peak Gradient: 2.88 mmHg                                        Deceleration Time: 232.8 msec  Tissue Doppler  E' Septal Velocity: 0.07 m/s  E' Lateral Velocity: 0.08 m/s  Aortic Valve  Peak Velocity: 1.62 m/s                Mean Velocity: 1.15 m/s  Peak Gradient: 10.44 mmHg              Mean Gradient: 6.01 mmHg  Area (continuity): 1.83 cm^2           Area (2D): 1.8 cm^2  AV VTI: 28.8 cm  Cusp Separation: 1.25 cm  Tricuspid Valve  Estimated RVSP: 37.54 mmHg              Estimated RAP: 10 mmHg  TR Velocity: 2.62 m/s                   TR Gradient: 27.54 mmHg  Pulmonic Valve  Peak Velocity: 0.96 m/s           Peak Gradient: 3.66 mmHg                                    Estimated PASP: 37.54 mmHg  LVOT  Peak Velocity: 0.89 m/s              Mean Velocity: 0.57 m/s  Peak Gradient: 3.16 mmHg             Mean Gradient: 1.58 mmHg  LVOT Diameter: 2.04 cm               LVOT VTI: 16.15 cm Structures  Left Atrium  LA Dimension: 4.54 cm                        LA Area: 11.81 cm^2  LA/Aorta: 1.25  LA Volume/Index: 44.64 ml /25 m^2  Left Ventricle  Diastolic Dimension: 9.23 cm         Systolic Dimension: 8.01 cm  Septum Diastolic: 8.22 cm            Septum Systolic: 3.32 cm  PW Diastolic: 1.5 cm                 PW Systolic: 2.09 cm                                       FS: 19.1 %  LV EDV/LV EDV Index: 87.32 ml/49 m^2 LV ESV/LV ESV Index: 52.68 ml/29 m^2  EF Calculated: 39.7 % LV Length: 6.12 cm  LVOT Diameter: 2.04 cm  Right Atrium  RA Systolic Pressure: 10 mmHg  Right Ventricle  Diastolic Dimension: 1.51 cm                                    RV Systolic Pressure: 98.01 mmHg Aorta/ Miscellaneous Aorta  Aortic Root: 3.62 cm  LVOT Diameter: 2.04 cm    Xr Chest (2 Vw)    1. No evidence of pneumothorax. Resolution of left pleural effusion. Cardiac silhouette remains enlarged. Left lower lobe hazy opacity may represent atelectasis versus pneumonia or infiltrate. COMPARISON: No prior studies available for comparison. DIAGNOSIS:  Post left thoracentesis. Dr. Colletta Das to read. COMMENTS: I50.43 Acute on chronic combined systolic and diastolic CHF (congestive heart failure) ([facility]) ICD10 TECHNIQUE: XR CHEST (2 VW) FINDINGS: Left lower lobe opacity may represent atelectasis versus pneumonia or infiltrate. Interval resolution of left pleural effusion. No evidence of pneumothorax. Cardiac silhouette is enlarged. Visualized soft tissues, and osseous structures are unremarkable. Cta Chest W Wo Contrast    Result Date: 1/26/2021  EXAMINATION:  CHEST CTA WITH CONTRAST (PULMONARY EMBOLISM PROTOCOL) CLINICAL HISTORY:   PE   I50.43 Acute on chronic combined systolic and diastolic CHF (congestive heart failure) ([facility] [address] ICD10. Technique:  Spiral axial CT acquisition of the chest from the thoracic inlet to the upper abdomen following IV contrast.  2-D images were reconstructed in the sagittal and coronal planes. 3-D MIPS images were generated in the coronal and axial planes. Images were reviewed on the PACS workstation. All images including the 3-D MIPS were personally archived. Contrast:  IV administration of 100 ml Isovue 300 All CT scans at this facility use dose modulation, iterative reconstruction, and/or weight based dosing when appropriate to reduce radiation dose to as low as reasonably achievable. Comparison:  CTA chest 3/15/2019  RESULT: Limitations: Motion artifact.  Evaluation for thromboembolic disease:      - Right heart chambers:  No thromboembolic disease.      - Main pulmonary arteries:  No thromboembolic disease.      - Lobar pulmonary arteries:  No thromboembolic disease.        - Segmental pulmonary arteries:  No thromboembolic disease.        - Subsegmental pulmonary arteries:  Not well visualized due to motion. Lines, tubes, and devices:  None. Lung parenchyma and pleura: Tortuous course of the trachea. Central airways appear grossly patent. Moderate left and small right pleural effusions with associated atelectasis. Limitations in evaluation of the lung parenchyma secondary to motion. Other areas of probable scarring/atelectasis. No pneumothorax. Thoracic inlet, heart, and mediastinum: Visualized thyroid unremarkable. No axillary, mediastinal, or hilar lymphadenopathy. Ascending thoracic aorta aneurysmal, measuring around 5.0 cm, similar to prior. Descending thoracic aorta aneurysmal and measures  up to 4.8 cm, also similar to prior. Normal pulmonary artery size. Cardiomegaly, similar to prior Scattered coronary artery calcifications. Small pericardial effusion. Small hiatal hernia. Bones and soft tissues:  No destructive bone lesion or acute osseous findings. Osteopenia. Scoliosis and kyphosis and multilevel degenerative changes, grossly similar to prior. Chest wall unremarkable. Upper abdomen:  No acute abnormality in the imaged upper abdomen. Reflux of contrast into the hepatic veins, associated with right heart failure. Lower neck: Unremarkable. No CT evidence of pulmonary embolism. Moderate left and small right pleural effusions. Cardiomegaly and small pericardial effusion. Reflux of contrast into the hepatic veins, associated with right heart failure. 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 No lung consolidative opacity. Areas of probable atelectasis/scarring.      Ct Abdomen Pelvis W Iv Contrast    Result Date: 2/8/2021  EXAMINATION: CT ABDOMEN PELVIS W IV CONTRAST DATE AND TIME:2/8/2021 10:36 AM CLINICAL HISTORY: Acute abdominal pain. Epigastric pain. epigastric pain  COMPARISON: None available. TECHNIQUE: Contiguous axial CT sections of the abdomen and pelvis. 100 cc's of IV contrast given. .  All CT scans at this facility use dose modulation, iterative reconstruction, and/or weight based dosing when appropriate to reduce radiation dose to as low as reasonably achievable. FINDINGS   Liver: Small hypodensities in the right hepatic lobe the largest measuring 8 mm consistent with small cysts. Spleen: Negative    Pancreas: Negative     Kidney: Negative   Adrenal glands are negative. Bowel: Negative    Nodes: No lymphadenopathy. Aorta: Dilated descending thoracic aorta maximum diameter approximately 4 cm adjacent appearance from the CT scans from January 25, 2021. No infrarenal abdominal aortic aneurysm. Peritoneum: No free fluid or free air. The abdominal wall is intact. Pelvis : 9.4 x 6.5 x 6.6 cm cystic mass in the left adnexa. This appears to be a chronic finding this patient was present on an MRI scan that included this area back in April 2016. Bladder catheter in place with decompressed bladder. Bones: No acute osseous abnormalities. Lung bases: Persistent bibasilar effusions not atelectasis left greater than right. PERSISTENT BIBASILAR PLEURAL-PARENCHYMAL CHANGES. NO ACUTE PATHOLOGY IN THE ABDOMEN OR PELVIS. Xr Chest Portable    Result Date: 2/8/2021  Exam: XR CHEST PORTABLE History: Shortness of breath. Chest pain. Technique: AP portable view of the chest obtained. Comparison: Portable chest radiograph from February 7, 2021 Findings: Heart size is enlarged. Small bilateral pleural effusions. Bibasilar opacities, left greater than right. No pneumothorax. No acute osseous abnormality. Small bilateral pleural effusions, left greater than right.  Bibasilar atelectasis and/or infiltrate, left greater than right. Xr Chest Portable    Result Date: 2/8/2021  EXAMINATION: XR CHEST PORTABLE REASON FOR EXAM: chest pain FINDINGS: Frontal view of the chest reveals cardiomegaly and prominent central pulmonary vasculature consistent with chronic congestion. Findings similar and unchanged from 2/3/2021. Opacification at the left base consistent with residual pneumonitis/atelectasis remains unchanged from previous study 4 days earlier. PNEUMONIA/ATELECTASIS LEFT BASE. CARDIOMEGALY. MILD CHRONIC CENTRAL VASCULAR CONGESTION. NO SIGNIFICANT CHANGE FROM 2/3/2020     Xr Chest Portable    Result Date: 2/4/2021  EXAMINATION: XR CHEST PORTABLE CLINICAL HISTORY: SHORTNESS OF BREATH, CHEST PAIN COMPARISONS: CTA CHEST, JANUARY 25, 2021. CHEST RADIOGRAPHS JANUARY 28, JANUARY 29, 2021. FINDINGS: Osseous structures intact. Cardiopericardial silhouette enlarged and unchanged. Cephalization pulmonary vasculature. No focal airspace disease. Increased opacity left lower lung obscures left diaphragm. STABLE MARKED CARDIOMEGALY. PROBABLE INTERSTITIAL EDEMA. LEFT LOWER LUNG ATELECTASIS/PNEUMONIA. Xr Chest Portable    Result Date: 1/28/2021  EXAMINATION: CHEST PORTABLE VIEW  CLINICAL HISTORY: Short of breath COMPARISONS: None  FINDINGS: Single  views of the chest is submitted. The cardiac silhouette is enlarged. Pulmonary vascular unremarkable. Right sided trachea. Left lower lobe infiltrate/consolidation, collapse with left sided pleural effusion.                                                                                    LEFT LOWER LOBE INFILTRATE/CONSOLIDATION, COLLAPSE WITH LEFT SIDED PLEURAL EFFUSION    Xr Chest Portable    Result Date: 1/26/2021  EXAMINATION: Portable AP ERECT view of the chest. CLINICAL HISTORY:  SOB , Negative Covid-19 test. DATE: 1/25/2021 5:41 PM COMPARISONS: 7/24/2017 FINDINGS: The heart is moderately enlarged, similar to prior exam.  Prominent interstitial markings, consistent with increasing Central vascular congestion. The costophrenic angles are blunted secondary to pleural effusions bilaterally, left greater than  right. No pneumothorax. There are infiltrates/atelectasis within lung bases, left greater than right. There are moderate degenerative changes in spine. CARDIAC ENLARGEMENT WITH CENTRAL VESSEL CONGESTION AND BILATERAL PLEURAL EFFUSIONS, POSSIBLE CONGESTIVE HEART FAILURE. BIBASILAR INFILTRATES/ATELECTASIS, LEFT GREATER THAN RIGHT. Ir Guided Thoracentesis Pleural    Technically successful ultrasound-guided thoracentesis without immediate complications. HISTORY: Kimberlee Gagnon is a Female of 80 years age. DIAGNOSIS: Left pleural effusion COMMENTS: I50.43 Acute on chronic combined systolic and diastolic CHF (congestive heart failure) ([facility]) ICD10 PROCEDURE: Following the discussion of procedure, risks versus benefits, possible complications, informed consent was obtained. Following universal protocol, patient and site verification was performed with a \"timeout\" prior to the procedure. Brief survey of the patient's left hemithorax demonstrate moderate amount of pleural effusion. After selection of an appropriate site for thoracentesis, the thoracic skin was prepped and draped in usual sterile fashion. Under ultrasound guidance, using lidocaine for local anesthesia, a 19-gauge CrowdPlateh catheter was inserted in the pleural cavity until effusion was aspirated. Using Vacutainer bottles, 755 mL of clear yellow effusion was drained. The catheter was removed and the aspiration site dressed. The patient tolerated procedure well. No immediate complications were identified. Subsequent chest radiograph demonstrated no evidence of pneumothorax. The patient left the radiology department in stable condition. Radiology nurse monitored patient's  vital signs throughout the procedure which lasted approximately 30 minutes.        IMPRESSION AND SUGGESTION:  1. Exudative lymphocyte predominant effusion. Cytology is negative, reactive mesothelial cells  2. Hypoxia and shortness of breath likely related to underlying A. fib/diastolic dysfunction and pleural effusion  3. Worsened hypoxia probably secondary to fluid overload / pulmonary edema, improved after diuretic    Patient had chest x-ray done showing small bilateral pleural effusion with left greater than right bibasilar atelectasis infiltration left greater than right. If pleural fluid increase may consider repeat thoracentesis and repeat the cytology. .  May consider sleep study as outpatient. PFT as outpatient. At this time recommend O2 to keep saturation 90% or above. Incentive spirometer and flutter valve. I spent more than 35 min with this patient's care , greater the 50% of this time was spent in counseling and/or coordinating of care.         Electronically signed by Kerri Brittle, MD, FCCP on 2/8/2021 at 8:16 PM

## 2021-02-09 NOTE — PROGRESS NOTES
Dr. Dana Olivares at bedside to evaluate pt, provider wants to transfer to  for concern of going back into CHF and the possibility of requiring another tap to drain the fluid. Provider ordered a one time dose of Lasix IV and to start the process. Updated Dr. Ever Hutchins of cardiology request and pulmonology and cardiology concerns via phone message.  Electronically signed by Madiha Reddy RN on 2/9/2021 at 3:37 PM

## 2021-02-09 NOTE — PROGRESS NOTES
Physical Therapy Rehab Treatment Note  Facility/Department: [facility]  [id number]       NAME: Kristy Lopez  : 1932 (80 y.o.)  MRN: [id number]  CODE STATUS: Full Code    Date of Service: 2021  Chart Reviewed: Yes  Family / Caregiver Present: Yes  General Comment  Comments: agreeable to tx    Restrictions:  Restrictions/Precautions: Fall Risk       SUBJECTIVE: Subjective: \"i get exercise now? \"  Pain Screening  Patient Currently in Pain: No       Post Treatment Pain Screenin  Pain Assessment  Pain Assessment: 0-10  Pain Level: 0    OBJECTIVE:      Neuromuscular Education  Neuromuscular Comments: static standing at ww. sit to stands x 5    Bed mobility  Supine to Sit: Modified independent  Sit to Supine: Modified independent    Transfers  Sit to Stand: Stand by assistance  Stand to sit: Stand by assistance    Exercises  Hip Flexion: x 20  Knee Long Arc Quad: x 20  Comments: standing marches. x 20     ASSESSMENT/COMMENTS:pts son Meghan Muñoz present and explained to him that we are monitoring her oxygen saturation while she exercises and not able to do as much with her as we were before. Pt gets very sob with activity and starts to c/o chest pain. Pt only able to tolerate 20 minutes of 60 minutes session this pm. Oxygen sats 94-97% this session. PLAN OF CARE/Safety: ongoing.          Therapy Time:   Individual   Time In 1330   Time Out 1350   Minutes 20     Minutes: 20      Transfer/Bed mobility trainin      Gait trainin      Neuro re education:5     Therapeutic ex:7      Louisa Leonard PTA, 21 at 3:57 PM

## 2021-02-09 NOTE — PROGRESS NOTES
RN agrees w/LPN assessment. Pt is here R/T CHF, pulmonary congestion and for rehab. Pt has been working w/therapy throughout the day. I have been working w/the LPN and monitoring the pt throughout the shift.  Electronically signed by Catalina Nowak RN on 2/9/2021 at 3:23 PM

## 2021-02-09 NOTE — PROGRESS NOTES
Progress Note  Patient: Onel Ott  Unit/Bed: S498/R343-63  YOB: 1932  MRN: [id number]  Acct: [de-identified]   Admitting Diagnosis: Impaired mobility [Z74.09]  Date:  2/2/2021  Hospital Day: 7    Chief Complaint:  Impaired mobility/NICMP/Recent PA-fib RVR/nonobstructive CAD    Subjective      2/3/21: Patient was transferred to rehab yesterday following hospitalization for acute decompensated systolic heart failure and new nonischemic cardiomyopathy with EF of 40%. She underwent left heart catheterization on 2/1/2021 which showed 60% mid LAD lesion with negative IFR. During her hospitalization she was noted to have new onset paroxysmal A. fib RVR and was initiated on oral anticoagulation with Xarelto. Also, during her hospitalization, she was noted to have moderate sized left pleural effusion and underwent left thoracentesis on 1/28/2021 with aspiration of 755 mL pleural fluid. Analysis of pleural fluid showed exudative lymphocyte predominant pleural effusion with main concern for malignancy which will need monitored as outpatient with repeat CT chest in 6 to 8 weeks and outpatient follow-up with pulmonology for cytology results. She was optimized on medical therapy and stable for transfer to rehab yesterday. Patient resting comfortably in bed in no acute distress. Complaining of dizziness this morning and has a cool washcloth on her forehead currently. Denies chest pain or shortness of breath. Taylor catheter in place draining dark/turbid urine. A.m. labs reviewed. Renal function and electrolytes stable. Hemoglobin low but stable at 10.3.    2/5/2021: Resting comfort. Denies any chest pain or shortness of breath.    2-8-21: developed SOB overnight and taylor replaced and had significant urine outpust. BP is marginal.     2-9-21: Patient developed shortness of breath today and is hypoxic with activity. Unable to participate in rehab.   Chest x-ray with vascular congestion/fluid overload/CHF findings. Patient is on IV Lasix 20 mg. Zeng remains in place and noted to have urinary retention by urology. Review of Systems:   Review of Systems   Constitutional: Negative for activity change. HENT: Negative for congestion. Respiratory: Negative for chest tightness and shortness of breath. Cardiovascular: Negative for chest pain, palpitations and leg swelling. Gastrointestinal: Negative for nausea and vomiting. Genitourinary: Zeng cathter in place   Skin: Negative for color change and pallor. Neurological: Positive for dizziness. Negative for syncope. Psychiatric/Behavioral: Negative for agitation and behavioral problems. Physical Examination:    /89   Pulse 94   Temp 99 °F (37.2 °C) (Oral)   Resp 17   Ht 5' 3\" (1.6 m)   Wt 143 lb (64.9 kg)   SpO2 91%   BMI 25.33 kg/m²    Physical Exam  Constitutional:       General: She is not in acute distress. Appearance: Normal appearance. HENT:      Head: Normocephalic and atraumatic. Neck:      Musculoskeletal: Normal range of motion and neck supple. Cardiovascular:      Rate and Rhythm: Normal rate and regular rhythm. Pulmonary:      Effort: Pulmonary effort is normal. No respiratory distress. Breath sounds: No wheezing, rhonchi or rales. Abdominal:      Palpations: Abdomen is soft. Tenderness: There is no abdominal tenderness. Musculoskeletal: Normal range of motion. Right lower leg: No edema. Left lower leg: No edema. Skin:     General: Skin is warm and dry. Neurological:      General: No focal deficit present. Mental Status: She is alert. Cranial Nerves: No cranial nerve deficit.    Psychiatric:         Mood and Affect: Mood normal.         Behavior: Behavior normal.         LABS:  CBC:   Lab Results   Component Value Date    WBC 9.8 02/09/2021    RBC 3.62 02/09/2021    HGB 10.5 02/09/2021    HCT 31.8 02/09/2021    MCV 87.8 02/09/2021    MCH 29.0 02/09/2021    MCHC 33.0 02/09/2021    RDW 14.1 02/09/2021     02/09/2021     CBC with Differential:   Lab Results   Component Value Date    WBC 9.8 02/09/2021    RBC 3.62 02/09/2021    HGB 10.5 02/09/2021    HCT 31.8 02/09/2021     02/09/2021    MCV 87.8 02/09/2021    MCH 29.0 02/09/2021    MCHC 33.0 02/09/2021    RDW 14.1 02/09/2021    LYMPHOPCT 9.0 01/25/2021    MONOPCT 8.7 01/25/2021    BASOPCT 0.4 01/25/2021    MONOSABS 1.0 01/25/2021    LYMPHSABS 1.0 01/25/2021    EOSABS 0.1 01/25/2021    BASOSABS 0.0 01/25/2021     CMP:    Lab Results   Component Value Date     02/09/2021    K 3.8 02/09/2021    K 4.1 01/25/2021     02/09/2021    CO2 25 02/09/2021    BUN 16 02/09/2021    CREATININE 0.68 02/09/2021    GFRAA >60.0 02/09/2021    LABGLOM >60.0 02/09/2021    GLUCOSE 116 02/09/2021    PROT 7.3 01/25/2021    LABALBU 3.5 01/25/2021    CALCIUM 8.4 02/09/2021    BILITOT 0.4 01/25/2021    ALKPHOS 57 01/25/2021    AST 23 01/25/2021    ALT 20 01/25/2021     BMP:    Lab Results   Component Value Date     02/09/2021    K 3.8 02/09/2021    K 4.1 01/25/2021     02/09/2021    CO2 25 02/09/2021    BUN 16 02/09/2021    LABALBU 3.5 01/25/2021    CREATININE 0.68 02/09/2021    CALCIUM 8.4 02/09/2021    GFRAA >60.0 02/09/2021    LABGLOM >60.0 02/09/2021    GLUCOSE 116 02/09/2021     Magnesium:    Lab Results   Component Value Date    MG 1.9 10/02/2020     Troponin:    Lab Results   Component Value Date    TROPONINI <0.010 01/26/2021       Radiology:      CTA Chest 1/25/21:  Impression       No CT evidence of pulmonary embolism.       Moderate left and small right pleural effusions.       Cardiomegaly and small pericardial effusion. Reflux of contrast into the hepatic veins, associated with right heart failure.       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       No lung consolidative opacity.  Areas of probable atelectasis/scarring. Echocardiogram 1/27/21:  Conclusions   Summary   Mitral annular calcification is present. 1+ MR   Left ventricular ejection fraction is visually estimated at 40%. E/A flow reversal noted. Suggestive of diastolic dysfunction. Signature   ----------------------------------------------------------------   Electronically signed by Marivel Almanzar MD(Interpreting   physician) on 01/27/2021 11:37 AM   ----------------------------------------------------------------   Findings  Left Ventricle  Left ventricular ejection fraction is visually estimated at 40%. E/A flow reversal noted. Suggestive of diastolic dysfunction. Right Ventricle  Normal right ventricle structure and function. Normal right ventricle systolic pressure. Left Atrium  Moderately dilated left atrium. Right Atrium  Moderately enlarged right atrium size. Mitral Valve  Mitral annular calcification is present. 1+ MR  Tricuspid Valve  Normal tricuspid valve structure and function. 1-2+ TR  RVSP 38 mmHg  Aortic Valve  Aortic valve leaflets are moderately thickened. No AS  No AR  Pulmonic Valve  The pulmonic valve was not well visualized . Pericardial Effusion  No evidence of pericardial effusion. Pleural Effusion  No evidence of pleural effusion. EKG 1/28/21: SR 77, ST depression with T wave inversion V1-V6, QTc 497ms        ASSESSMENT:        Acute on chronic decompensated combined congestive heart failure. Questionable right ventricular failure. [facility] Likely pulmonary hypertension  Paroxysmal atrial fibrillation- normal sinus rhythm now. History of coronary disease- Mod LAD disease, negative IFR. History of SVT status post ablation  History of ascending and descending thoracic aortic aneurysm-stable  Essential hypertension  Left pleural effusion status post thoracentesis.          PLAN:   1. As always, aggressive risk factor modification is strongly recommended.  We should adhere to the JNC VIII guidelines for HTN management and the NCEPATP III guidelines for LDL-C management. 2. Maintain taylor./Urology recommendation  3. Continue with Lasix IV., monitor I's and O's and renal function. Increase Lasix to 20 mg IV every 12 hours  4. Max cardiac meds-cont with current cardiac meds. 5. Full dose anticoagulation, Xarelto  6. Check EKG periodically. 7. Pulmonary recommendations- follow up CT of chest in near future. 8. Transfer to [facility] for further monitoring and diuresis.        Electronically signed by Alberta Coker DO on 2/3/21 at 12:16 PM EST

## 2021-02-09 NOTE — PROGRESS NOTES
Subjective: The patient complains of  moderate to severe acute    CHF partially relieved by rest, PT, OT,   and exacerbated by recent illness and exertion. I am concerned about patients  frailty and bleeding risk on Xarelto. Her exacerbation of CHF seems to be improving on monitoring her blood pressure closely pulmonology and cardiology are involved she has diuresed and has a Taylor catheter in. She has been able to be weaned down to 6 L of high flow oxygen. According to recent nursing note, \"  Assumed care for this patient at [phone]. Pt has slept comfortably all night. Respiratory bumped pt down to 6L high flow and in the day he patient should be on O2 NC 6L an no high flow if possible. Dr Zenon Calderon wants her to be 80 and above . Pt is SOB with exertion. Urology consult for today. Right hand hep lock good through 2/11. ASA and Xalralto anti coagulants. Pt has a taylor draining clear yellow urine 1200ml. \".    ROS x10: The patient also complains of severely impaired mobility and activities of daily living. Otherwise no new problems with vision, hearing, nose, mouth, throat, dermal, cardiovascular, GI, , pulmonary, musculoskeletal, psychiatric or neurological. See Rehab H&P on Rehab chart dated . Vital signs:  BP 99/64   Pulse 78   Temp 98 °F (36.7 °C)   Resp 18   Ht 5' 3\" (1.6 m)   Wt 143 lb (64.9 kg)   SpO2 91%   BMI 25.33 kg/m²   I/O:   PO/Intake:  fair PO intake, no problems observed or reported. Bowel/Bladder:  continent, constipation  ,taylor  General:  Patient is well developed, adequately nourished, non-obese and     well kempt. HEENT:    PERRLA, hearing intact to loud voice, external inspection of ear     and nose benign. Inspection of lips, tongue and gums benign  Musculoskeletal: No significant change in strength or tone. All joints stable. Inspection and palpation of digits and nails show no clubbing,       cyanosis or inflammatory conditions. Neuro/Psychiatric: Affect: flat. Alert and oriented to person, place and     situation. No significant change in deep tendon reflexes or     sensation  Lungs:  Diminished, CTA-B. Respiration effort is normal at rest.     Heart:   S1 = S2, RRR. No loud murmurs. Abdomen:  Soft, non-tender, no enlargement of liver or spleen. Extremities:  No significant lower extremity edema or tenderness. Skin:   Intact to general survey, no visualized or palpated problems. Rehabilitation:  Physical therapy: FIMS:  Bed Mobility:      Transfers: Sit to Stand: Stand by assistance  Stand to sit: Stand by assistance  Bed to Chair: Contact guard assistance, Ambulation 1  Surface: carpet  Device: Rolling Walker  Other Apparatus: O2  Assistance: Contact guard assistance  Quality of Gait: decreased clayton heel strike, shortened step length, slow raji, increased hip flx on LLE during swing phase to compensate for foot drop  Gait Deviations: Slow Raji, Increased JAMES  Distance: 50'  Comments: distance not focus, Stairs  # Steps : 8  Stairs Height: 6\"  Rails: Bilateral  Device: No Device  Assistance: Contact guard assistance  Comment: non reciprocal pattern, SOB post stair training, SpO2 93%    FIMS:  ,  , Assessment: Pt limited this session by fatigue and SOB. Frequent RB's utilized with education on proper breathing technique. Pt challenged with gait drills this session and trialing fwd ambulation on // bars with only one UE support. Pt initially apprehensive but agreeable with encouragement. Mild unsteadiness with static standing requiring occ CGA from this PTA. Pt able to maintain static standing without UE support x15\". Seated ther ex utilized to continue to strengthen BLE and improve endurance and overall functional mobility. Occupational therapy: FIMS:   ,  , Assessment: Pt is an 81 y/o female from home alone who presents to [facility] with the above deficits which impact her independence and safety during ADLs.  Pt would benefit from OT services to maximize independence and safety during ADLs. Speech therapy: FIMS:        Lab/X-ray studies reviewed, analyzed and discussed with patient and staff:   Recent Results (from the past 24 hour(s))   Basic Metabolic Panel    Collection Time: 02/09/21  5:47 AM   Result Value Ref Range    Sodium 140 135 - 144 mEq/L    Potassium 3.8 3.4 - 4.9 mEq/L    Chloride 103 95 - 107 mEq/L    CO2 25 20 - 31 mEq/L    Anion Gap 12 9 - 15 mEq/L    Glucose 116 (H) 70 - 99 mg/dL    BUN 16 8 - 23 mg/dL    CREATININE 0.68 0.50 - 0.90 mg/dL    GFR Non-African American >60.0 >60    GFR  >60.0 >60    Calcium 8.4 (L) 8.5 - 9.9 mg/dL   CBC    Collection Time: 02/09/21  5:47 AM   Result Value Ref Range    WBC 9.8 4.8 - 10.8 K/uL    RBC 3.62 (L) 4.20 - 5.40 M/uL    Hemoglobin 10.5 (L) 12.0 - 16.0 g/dL    Hematocrit 31.8 (L) 37.0 - 47.0 %    MCV 87.8 82.0 - 100.0 fL    MCH 29.0 27.0 - 31.3 pg    MCHC 33.0 33.0 - 37.0 %    RDW 14.1 11.5 - 14.5 %    Platelets 150 285 - 536 K/uL       Previous extensive, complex labs, notes and diagnostics reviewed and analyzed. ALLERGIES:    Allergies as of 02/02/2021 - Review Complete 02/02/2021   Allergen Reaction Noted    Latex  07/19/2017    Tape [adhesive tape]  06/28/2016      (please also verify by checking MAR)      Yesterday I evaluated this patient for periodic reassessment of medical and functional status. The patient was discussed in detail at the treatment team meeting focusing on current medical issues, progress in therapies, social issues, psychological issues, barriers to progress and strategies to address these barriers, and discharge planning. See the hand written addendum to rehab progress note. The patient continues to be high risk for future disability and their medical and rehabilitation prognosis continue to be good and therefore, we will continue the patient's rehabilitation course as planned.   The patient's tentative discharge date was set. Patient and family education was discussed. The patient was made aware of the team discussion regarding their progress. Discharge plans were discussed along with barriers to progress and strategies to address these barriers, patient encouraged to continue to discuss discharge plans with . Complex Physical Medicine & Rehab Issues Assess & Plan:   1. Severe abnormality of gait and mobility and impaired self-care and ADL's secondary to progressive weakness dt   CHF . Functional and medical status reassessed regarding patients ability to participate in therapies and patient found to be able to participate in acute intensive comprehensive inpatient rehabilitation program including PT/OT to improve balance, ambulation, ADLs, and to improve the P/AROM. Therapeutic modifications regarding activities in therapies, place, amount of time per day and intensity of therapy made daily. In bed therapies or bedside therapies prn.   2. Bowel progressive constipation, and Bladder dysfunction monitoring neurogenic bladder:  frequent toileting, ambulate to bathroom with assistance, check post void residuals. Check for C.difficile x1 if >2 loose stools in 24 hours, continue bowel & bladder program.  Monitor bowel and bladder function. Lactinex 2 PO every AC. MOM prn, Brown Bomb prn, Glycerin suppository prn, enema prn.  3. Severe lbp pain as well as generalized OA pain: reassess pain every shift and prior to and after each therapy session, give prn medications, modalities prn in therapy, Lidoderm, K-pad prn.   4. Skin healing and breakdown risk:  continue pressure relief program.  Daily skin exams and reports from nursing. 5. Severe fatigue due to nutritional and hydration deficiency: Add vitamin B12 vitamin D and CoQ10 continue to monitor I&Os, calorie counts prn, dietary consult prn. Add healthy HS snack.   6. Acute episodic insomnia with situational adjustment disorder:  prn Ambien, monitor for day time sedation. Add HS \"Tuck In\"  7. Falls risk elevated:  patient to use call light to get nursing assistance to get up, bed and chair alarm. 8. Elevated DVT risk: progressive activities in PT, continue prophylaxis PORTILLO hose, elevation and Xarelto. 9. Complex discharge planning: Discharge 2/11/2021 home alone with help from her son and daughter as well as home health care. She is status post weekly team meeting every Monday to assess progress towards goals, discuss and address social, psychological and medical comorbidities and to address difficulties they may be having progressing in therapy. Patient and family education is in progress. The patient is to follow-up with their family physician after discharge. Complex Active General Medical Issues that complicate care Assess & Plan:     1. Principal Problem:    Impaired mobility and activities of daily living due to CHF [facility] admit 2021  Active Problems:  1. Paroxysmal atrial fibrillation,  Essential hypertension,Acute on chronic combined systolic and diastolic CHF (congestive heart failure)-continue blood signs every shift focusing on heart rate and blood pressure checks, consult hospitalist for backup medical and adjust/add medications (aspirin, Coreg, Cardizem, Lasix, Nitrostat) plan as above consult cardiology titrate IV Lasix recheck chest x-ray wean high flow oxygen  2. Lumbar stenosis with neurogenic claudication  3. Charcot Arleen Tooth muscular atrophy  4. Osteoarthritis of lumbar spine with myelopathy,  Osteoarthritis  5. Severe depression and anxiety -emotional support provided daily, vitamin B12, encourage participation in rehabilitation support group and recreational therapy, adjust/add medications (Celexa Ativan)  6.  Hypothyroidism-titrate Synthroid            Taurus Johnson D.O., PM&R     Attending    [address] [place], [place] [address]rakan Cabrera

## 2021-02-10 ENCOUNTER — APPOINTMENT (OUTPATIENT)
Dept: GENERAL RADIOLOGY | Age: 86
DRG: 291 | End: 2021-02-10
Attending: FAMILY MEDICINE
Payer: MEDICARE

## 2021-02-10 LAB
ANION GAP SERPL CALCULATED.3IONS-SCNC: 11 MEQ/L (ref 9–15)
BUN BLDV-MCNC: 15 MG/DL (ref 8–23)
CALCIUM SERPL-MCNC: 8.5 MG/DL (ref 8.5–9.9)
CHLORIDE BLD-SCNC: 99 MEQ/L (ref 95–107)
CHOLESTEROL, TOTAL: 123 MG/DL (ref 0–199)
CO2: 27 MEQ/L (ref 20–31)
CREAT SERPL-MCNC: 0.81 MG/DL (ref 0.5–0.9)
GFR AFRICAN AMERICAN: >60
GFR NON-AFRICAN AMERICAN: >60
GLUCOSE BLD-MCNC: 134 MG/DL (ref 70–99)
HDLC SERPL-MCNC: 39 MG/DL (ref 40–59)
LDL CHOLESTEROL CALCULATED: 68 MG/DL (ref 0–129)
MAGNESIUM: 1.7 MG/DL (ref 1.7–2.4)
POTASSIUM SERPL-SCNC: 4 MEQ/L (ref 3.4–4.9)
SARS-COV-2, NAAT: NOT DETECTED
SODIUM BLD-SCNC: 137 MEQ/L (ref 135–144)
TRIGL SERPL-MCNC: 78 MG/DL (ref 0–150)

## 2021-02-10 PROCEDURE — 6370000000 HC RX 637 (ALT 250 FOR IP): Performed by: FAMILY MEDICINE

## 2021-02-10 PROCEDURE — U0002 COVID-19 LAB TEST NON-CDC: HCPCS

## 2021-02-10 PROCEDURE — 94761 N-INVAS EAR/PLS OXIMETRY MLT: CPT

## 2021-02-10 PROCEDURE — 80061 LIPID PANEL: CPT

## 2021-02-10 PROCEDURE — 36415 COLL VENOUS BLD VENIPUNCTURE: CPT

## 2021-02-10 PROCEDURE — 99221 1ST HOSP IP/OBS SF/LOW 40: CPT | Performed by: PHYSICAL MEDICINE & REHABILITATION

## 2021-02-10 PROCEDURE — 94640 AIRWAY INHALATION TREATMENT: CPT

## 2021-02-10 PROCEDURE — 71045 X-RAY EXAM CHEST 1 VIEW: CPT

## 2021-02-10 PROCEDURE — 2580000003 HC RX 258: Performed by: FAMILY MEDICINE

## 2021-02-10 PROCEDURE — 6370000000 HC RX 637 (ALT 250 FOR IP): Performed by: INTERNAL MEDICINE

## 2021-02-10 PROCEDURE — 6360000002 HC RX W HCPCS: Performed by: FAMILY MEDICINE

## 2021-02-10 PROCEDURE — 80048 BASIC METABOLIC PNL TOTAL CA: CPT

## 2021-02-10 PROCEDURE — 93010 ELECTROCARDIOGRAM REPORT: CPT | Performed by: INTERNAL MEDICINE

## 2021-02-10 PROCEDURE — 93005 ELECTROCARDIOGRAM TRACING: CPT | Performed by: INTERNAL MEDICINE

## 2021-02-10 PROCEDURE — 99223 1ST HOSP IP/OBS HIGH 75: CPT | Performed by: INTERNAL MEDICINE

## 2021-02-10 PROCEDURE — 2060000000 HC ICU INTERMEDIATE R&B

## 2021-02-10 PROCEDURE — 2700000000 HC OXYGEN THERAPY PER DAY

## 2021-02-10 PROCEDURE — 83735 ASSAY OF MAGNESIUM: CPT

## 2021-02-10 RX ORDER — PANTOPRAZOLE SODIUM 40 MG/1
40 TABLET, DELAYED RELEASE ORAL
Status: DISCONTINUED | OUTPATIENT
Start: 2021-02-10 | End: 2021-02-19 | Stop reason: HOSPADM

## 2021-02-10 RX ORDER — POTASSIUM CHLORIDE 20 MEQ/1
20 TABLET, EXTENDED RELEASE ORAL
Status: DISCONTINUED | OUTPATIENT
Start: 2021-02-11 | End: 2021-02-19 | Stop reason: HOSPADM

## 2021-02-10 RX ORDER — CITALOPRAM 20 MG/1
20 TABLET ORAL DAILY
Status: DISCONTINUED | OUTPATIENT
Start: 2021-02-10 | End: 2021-02-19 | Stop reason: HOSPADM

## 2021-02-10 RX ORDER — CITALOPRAM 20 MG/1
20 TABLET ORAL DAILY
Status: DISCONTINUED | OUTPATIENT
Start: 2021-02-10 | End: 2021-02-10

## 2021-02-10 RX ORDER — DILTIAZEM HYDROCHLORIDE 120 MG/1
120 CAPSULE, COATED, EXTENDED RELEASE ORAL DAILY
Status: DISCONTINUED | OUTPATIENT
Start: 2021-02-10 | End: 2021-02-10

## 2021-02-10 RX ORDER — ONDANSETRON 2 MG/ML
4 INJECTION INTRAMUSCULAR; INTRAVENOUS EVERY 6 HOURS PRN
Status: DISCONTINUED | OUTPATIENT
Start: 2021-02-10 | End: 2021-02-10 | Stop reason: SDUPTHER

## 2021-02-10 RX ORDER — LORAZEPAM 0.5 MG/1
0.5 TABLET ORAL EVERY 6 HOURS PRN
Status: DISCONTINUED | OUTPATIENT
Start: 2021-02-10 | End: 2021-02-19 | Stop reason: HOSPADM

## 2021-02-10 RX ORDER — DILTIAZEM HYDROCHLORIDE 120 MG/1
120 CAPSULE, COATED, EXTENDED RELEASE ORAL 2 TIMES DAILY
Status: DISCONTINUED | OUTPATIENT
Start: 2021-02-10 | End: 2021-02-19

## 2021-02-10 RX ORDER — SODIUM CHLORIDE 0.9 % (FLUSH) 0.9 %
10 SYRINGE (ML) INJECTION PRN
Status: DISCONTINUED | OUTPATIENT
Start: 2021-02-10 | End: 2021-02-10 | Stop reason: SDUPTHER

## 2021-02-10 RX ORDER — NITROGLYCERIN 0.4 MG/1
0.4 TABLET SUBLINGUAL EVERY 5 MIN PRN
Status: DISCONTINUED | OUTPATIENT
Start: 2021-02-10 | End: 2021-02-19 | Stop reason: HOSPADM

## 2021-02-10 RX ORDER — FUROSEMIDE 10 MG/ML
20 INJECTION INTRAMUSCULAR; INTRAVENOUS 2 TIMES DAILY
Status: DISCONTINUED | OUTPATIENT
Start: 2021-02-10 | End: 2021-02-10

## 2021-02-10 RX ORDER — PROMETHAZINE HYDROCHLORIDE 12.5 MG/1
12.5 TABLET ORAL EVERY 6 HOURS PRN
Status: DISCONTINUED | OUTPATIENT
Start: 2021-02-10 | End: 2021-02-10 | Stop reason: SDUPTHER

## 2021-02-10 RX ORDER — ASPIRIN 81 MG/1
81 TABLET, CHEWABLE ORAL DAILY
Status: DISCONTINUED | OUTPATIENT
Start: 2021-02-10 | End: 2021-02-19 | Stop reason: HOSPADM

## 2021-02-10 RX ORDER — POLYETHYLENE GLYCOL 3350 17 G/17G
17 POWDER, FOR SOLUTION ORAL DAILY PRN
Status: DISCONTINUED | OUTPATIENT
Start: 2021-02-10 | End: 2021-02-10 | Stop reason: SDUPTHER

## 2021-02-10 RX ORDER — ASPIRIN 81 MG/1
81 TABLET, CHEWABLE ORAL DAILY
Status: DISCONTINUED | OUTPATIENT
Start: 2021-02-10 | End: 2021-02-10

## 2021-02-10 RX ORDER — BUDESONIDE AND FORMOTEROL FUMARATE DIHYDRATE 160; 4.5 UG/1; UG/1
2 AEROSOL RESPIRATORY (INHALATION) 2 TIMES DAILY
Status: DISCONTINUED | OUTPATIENT
Start: 2021-02-10 | End: 2021-02-19 | Stop reason: HOSPADM

## 2021-02-10 RX ORDER — ACETAMINOPHEN 325 MG/1
650 TABLET ORAL EVERY 6 HOURS PRN
Status: DISCONTINUED | OUTPATIENT
Start: 2021-02-10 | End: 2021-02-10 | Stop reason: SDUPTHER

## 2021-02-10 RX ORDER — SIMETHICONE 80 MG
80 TABLET,CHEWABLE ORAL EVERY 6 HOURS PRN
Status: DISCONTINUED | OUTPATIENT
Start: 2021-02-10 | End: 2021-02-19 | Stop reason: HOSPADM

## 2021-02-10 RX ORDER — CHOLECALCIFEROL (VITAMIN D3) 125 MCG
1000 CAPSULE ORAL DAILY
Status: DISCONTINUED | OUTPATIENT
Start: 2021-02-10 | End: 2021-02-19 | Stop reason: HOSPADM

## 2021-02-10 RX ORDER — LEVOTHYROXINE SODIUM 88 UG/1
88 TABLET ORAL DAILY
Status: DISCONTINUED | OUTPATIENT
Start: 2021-02-10 | End: 2021-02-19 | Stop reason: HOSPADM

## 2021-02-10 RX ORDER — CARVEDILOL 3.12 MG/1
3.12 TABLET ORAL 2 TIMES DAILY
Status: DISCONTINUED | OUTPATIENT
Start: 2021-02-10 | End: 2021-02-19 | Stop reason: HOSPADM

## 2021-02-10 RX ORDER — LEVOTHYROXINE SODIUM 88 UG/1
88 TABLET ORAL DAILY
Status: DISCONTINUED | OUTPATIENT
Start: 2021-02-10 | End: 2021-02-10

## 2021-02-10 RX ADMIN — ASPIRIN 81 MG: 81 TABLET, CHEWABLE ORAL at 08:28

## 2021-02-10 RX ADMIN — Medication 10 ML: at 21:32

## 2021-02-10 RX ADMIN — RIVAROXABAN 15 MG: 15 TABLET, FILM COATED ORAL at 16:42

## 2021-02-10 RX ADMIN — Medication 10 ML: at 08:28

## 2021-02-10 RX ADMIN — CARVEDILOL 3.12 MG: 3.12 TABLET, FILM COATED ORAL at 21:31

## 2021-02-10 RX ADMIN — SODIUM CHLORIDE, PRESERVATIVE FREE 10 ML: 5 INJECTION INTRAVENOUS at 16:42

## 2021-02-10 RX ADMIN — CYANOCOBALAMIN TAB 500 MCG 1000 MCG: 500 TAB at 08:28

## 2021-02-10 RX ADMIN — PANTOPRAZOLE SODIUM 40 MG: 40 TABLET, DELAYED RELEASE ORAL at 16:42

## 2021-02-10 RX ADMIN — LEVOTHYROXINE SODIUM 88 MCG: 0.09 TABLET ORAL at 05:26

## 2021-02-10 RX ADMIN — BUDESONIDE AND FORMOTEROL FUMARATE DIHYDRATE 2 PUFF: 160; 4.5 AEROSOL RESPIRATORY (INHALATION) at 19:52

## 2021-02-10 RX ADMIN — FUROSEMIDE 20 MG: 10 INJECTION, SOLUTION INTRAVENOUS at 16:42

## 2021-02-10 RX ADMIN — CITALOPRAM HYDROBROMIDE 20 MG: 20 TABLET ORAL at 08:28

## 2021-02-10 RX ADMIN — DILTIAZEM HYDROCHLORIDE 120 MG: 120 CAPSULE, COATED, EXTENDED RELEASE ORAL at 08:28

## 2021-02-10 RX ADMIN — FUROSEMIDE 20 MG: 10 INJECTION, SOLUTION INTRAVENOUS at 08:28

## 2021-02-10 RX ADMIN — DILTIAZEM HYDROCHLORIDE 120 MG: 120 CAPSULE, COATED, EXTENDED RELEASE ORAL at 21:31

## 2021-02-10 ASSESSMENT — ENCOUNTER SYMPTOMS
ALLERGIC/IMMUNOLOGIC NEGATIVE: 1
BLOOD IN STOOL: 0
NAUSEA: 0
STRIDOR: 0
GASTROINTESTINAL NEGATIVE: 1
WHEEZING: 0
SORE THROAT: 0
EYES NEGATIVE: 1
PHOTOPHOBIA: 0
COUGH: 0
WHEEZING: 1
EYE REDNESS: 0
BACK PAIN: 1
EYE PAIN: 0
SHORTNESS OF BREATH: 1
ABDOMINAL PAIN: 0
DIARRHEA: 0
CONSTIPATION: 1
VOMITING: 0

## 2021-02-10 ASSESSMENT — PAIN SCALES - GENERAL: PAINLEVEL_OUTOF10: 0

## 2021-02-10 NOTE — CARE COORDINATION
[facility] [place] Case Management Initial Discharge Assessment  This assessment was unchanged from previous discharge with the exception of agreement with [address] services. Son, Cristine Overton, stated that patient does not want anyone coming into her home, he said that she wants everyone to take their shoes off in her home and she is worried that any [address] services would not do that. Cristine Overton said he cannot be with her 24/7. He said that the patient likes PT and would be more agreeable to working with PT if she were somewhere inpatient. He also said that the patient is very anxious to get home too. Will continue to follow patient for discharge planning.       The LSW talked to the patient's son, Cristine Overton, to discuss the discharge plan.          PCP: Sayda Hoffman MD                                Date of Last Visit: Approximately 2 months ago per the patient's son.     If no PCP, list provided? N/A     Discharge Planning     Living Arrangements: independently at home     Who do you live with? self     Who helps you with your care: Son lives near by     If lives at home:                Do you have any barriers navigating in your home? No                  Patient can perform ADL? Yes     Current Services (outpatient and in home) :  None     Dialysis: No     Is transportation available to get to your appointments? Yes - Son transports the patient to appointment.     DME Equipment:  Per the patient's son, the patient has a cane and several walkers.   The patient's son states the patient does not use any devices, but does try to encourage her.      Respiratory equipment: None                          Respiratory provider:  N/A        Pharmacy:  [facility] [address]; [facility] with Medication Assistance Program?  No

## 2021-02-10 NOTE — DISCHARGE SUMMARY
Subjective: The patient complains of  moderate to severe acute    CHF partially relieved by rest, PT, OT,   and exacerbated by recent illness and exertion. I am concerned about patients  frailty and bleeding risk on Xarelto. Her exacerbation of CHF seems to be improving on monitoring her blood pressure closely pulmonology and cardiology are involved she has diuresed and has a Taylor catheter in. She has been able to be weaned down to 6 L of high flow oxygen. According to recent nursing note, \"  Assumed care for this patient at [phone]. Pt has slept comfortably all night. Respiratory bumped pt down to 6L high flow and in the day he patient should be on O2 NC 6L an no high flow if possible. Dr Ifeoma Guerrero wants her to be 80 and above . Pt is SOB with exertion. Urology consult for today. Right hand hep lock good through 2/11. ASA and Xalralto anti coagulants. Pt has a taylor draining clear yellow urine 1200ml. \".    ROS x10: The patient also complains of severely impaired mobility and activities of daily living. Otherwise no new problems with vision, hearing, nose, mouth, throat, dermal, cardiovascular, GI, , pulmonary, musculoskeletal, psychiatric or neurological. See Rehab H&P on Rehab chart dated . Vital signs:  /86   Pulse 76   Temp 98 °F (36.7 °C)   Resp 19   Ht 5' 3\" (1.6 m)   Wt 143 lb (64.9 kg)   SpO2 94%   BMI 25.33 kg/m²   I/O:   PO/Intake:  fair PO intake, no problems observed or reported. Bowel/Bladder:  continent, constipation  ,taylor  General:  Patient is well developed, adequately nourished, non-obese and     well kempt. HEENT:    PERRLA, hearing intact to loud voice, external inspection of ear     and nose benign. Inspection of lips, tongue and gums benign  Musculoskeletal: No significant change in strength or tone. All joints stable. Inspection and palpation of digits and nails show no clubbing,       cyanosis or inflammatory conditions. Neuro/Psychiatric: Affect: flat. Alert and oriented to person, place and     situation. No significant change in deep tendon reflexes or     sensation  Lungs:  Diminished, CTA-B. Respiration effort is normal at rest.     Heart:   S1 = S2, RRR. No loud murmurs. Abdomen:  Soft, non-tender, no enlargement of liver or spleen. Extremities:  No significant lower extremity edema or tenderness. Skin:   Intact to general survey, no visualized or palpated problems. Rehabilitation:  Physical therapy: FIMS:  Bed Mobility:      Transfers: Sit to Stand: Stand by assistance  Stand to sit: Stand by assistance  Bed to Chair: Contact guard assistance, Ambulation 1  Surface: carpet  Device: Rolling Walker  Other Apparatus: O2  Assistance: Contact guard assistance  Quality of Gait: decreased clayton heel strike, shortened step length, slow raji, increased hip flx on LLE during swing phase to compensate for foot drop  Gait Deviations: Slow Raji, Increased JAMES  Distance: 50'  Comments: distance not focus, Stairs  # Steps : 8  Stairs Height: 6\"  Rails: Bilateral  Device: No Device  Assistance: Contact guard assistance  Comment: non reciprocal pattern, SOB post stair training, SpO2 93%    FIMS:  ,  , Assessment: Pt limited this session by fatigue and SOB. Frequent RB's utilized with education on proper breathing technique. Pt challenged with gait drills this session and trialing fwd ambulation on // bars with only one UE support. Pt initially apprehensive but agreeable with encouragement. Mild unsteadiness with static standing requiring occ CGA from this PTA. Pt able to maintain static standing without UE support x15\". Seated ther ex utilized to continue to strengthen BLE and improve endurance and overall functional mobility. Occupational therapy: FIMS:   ,  , Assessment: Pt is an 79 y/o female from home alone who presents to [facility] with the above deficits which impact her independence and safety during ADLs.  Pt would benefit from OT services to maximize independence and safety during ADLs. Speech therapy: FIMS:        Lab/X-ray studies reviewed, analyzed and discussed with patient and staff:   Recent Results (from the past 24 hour(s))   COVID-19    Collection Time: 02/09/21  3:33 PM   Result Value Ref Range    SARS-CoV-2, NAAT Not Detected Not Detected   EKG 12 Lead    Collection Time: 02/10/21 12:51 AM   Result Value Ref Range    Ventricular Rate 70 BPM    Atrial Rate 70 BPM    P-R Interval 204 ms    QRS Duration 150 ms    Q-T Interval 466 ms    QTc Calculation (Bazett) 503 ms    P Axis 59 degrees    R Axis -61 degrees    T Axis -59 degrees   Basic Metabolic Panel    Collection Time: 02/10/21  5:47 AM   Result Value Ref Range    Sodium 137 135 - 144 mEq/L    Potassium 4.0 3.4 - 4.9 mEq/L    Chloride 99 95 - 107 mEq/L    CO2 27 20 - 31 mEq/L    Anion Gap 11 9 - 15 mEq/L    Glucose 134 (H) 70 - 99 mg/dL    BUN 15 8 - 23 mg/dL    CREATININE 0.81 0.50 - 0.90 mg/dL    GFR Non-African American >60.0 >60    GFR  >60.0 >60    Calcium 8.5 8.5 - 9.9 mg/dL   Magnesium    Collection Time: 02/10/21  5:47 AM   Result Value Ref Range    Magnesium 1.7 1.7 - 2.4 mg/dL   Lipid panel - fasting    Collection Time: 02/10/21  5:47 AM   Result Value Ref Range    Cholesterol, Total 123 0 - 199 mg/dL    Triglycerides 78 0 - 150 mg/dL    HDL 39 (L) 40 - 59 mg/dL    LDL Calculated 68 0 - 129 mg/dL       Previous extensive, complex labs, notes and diagnostics reviewed and analyzed. ALLERGIES:    Allergies as of 02/02/2021 - Review Complete 02/02/2021   Allergen Reaction Noted    Latex  07/19/2017    Tape [adhesive tape]  06/28/2016      (please also verify by checking MAR)      Yesterday I evaluated this patient for periodic reassessment of medical and functional status.   The patient was discussed in detail at the treatment team meeting focusing on current medical issues, progress in therapies, social issues, psychological issues, barriers to progress and strategies to address these barriers, and discharge planning. See the hand written addendum to rehab progress note. The patient continues to be high risk for future disability and their medical and rehabilitation prognosis continue to be good and therefore, we will continue the patient's rehabilitation course as planned. The patient's tentative discharge date was set. Patient and family education was discussed. The patient was made aware of the team discussion regarding their progress. Discharge plans were discussed along with barriers to progress and strategies to address these barriers, patient encouraged to continue to discuss discharge plans with . Complex Physical Medicine & Rehab Issues Assess & Plan:   1. Severe abnormality of gait and mobility and impaired self-care and ADL's secondary to progressive weakness dt   CHF . Functional and medical status reassessed -patient no longer able to tolerate acute rehab because of her need for oxygen she needs to go to the medical floor to assess her CHF and COPD exacerbation. 2. Bowel progressive constipation, and Bladder dysfunction monitoring neurogenic bladder:  frequent toileting, ambulate to bathroom with assistance, check post void residuals. Check for C.difficile x1 if >2 loose stools in 24 hours, continue bowel & bladder program.  Monitor bowel and bladder function. Lactinex 2 PO every AC. MOM prn, Brown Bomb prn, Glycerin suppository prn, enema prn.  3. Severe lbp pain as well as generalized OA pain: reassess pain every shift and prior to and after each therapy session, give prn medications, modalities prn in therapy, Lidoderm, K-pad prn.   4. Skin healing and breakdown risk:  continue pressure relief program.  Daily skin exams and reports from nursing.   5. Severe fatigue due to nutritional and hydration deficiency: Add vitamin B12 vitamin D and CoQ10 continue to monitor I&Os, calorie counts prn, dietary consult prn. Add healthy HS snack. 6. Acute episodic insomnia with situational adjustment disorder:  prn Ambien, monitor for day time sedation. Add HS \"Tuck In\"  7. Falls risk elevated:  patient to use call light to get nursing assistance to get up, bed and chair alarm. 8. Elevated DVT risk: progressive activities in PT, continue prophylaxis PORTILLO hose, elevation and Xarelto. 9. Complex discharge planning: We will go to the medical floor for medical stability prior discharge had been planned for 2/11/2021 home alone with help from her son and daughter as well as home health care. She is status post weekly team meeting every Monday to assess progress towards goals, discuss and address social, psychological and medical comorbidities and to address difficulties they may be having progressing in therapy. Patient and family education is in progress. The patient is to follow-up with their family physician after discharge. Complex Active General Medical Issues that complicate care Assess & Plan:     1. Principal Problem:    Impaired mobility and activities of daily living due to CHF [facility] admit 2021  Active Problems:  1. Increased need for oxygen and high flow oxygen at that due to acute exacerbation of CHF paroxysmal atrial fibrillation,  Essential hypertension,Acute on chronic combined systolic and diastolic CHF (congestive heart failure)-continue blood signs every shift focusing on heart rate and blood pressure checks, consult hospitalist for backup medical and adjust/add medications (aspirin, Coreg, Cardizem, Lasix, Nitrostat) plan as above consult cardiology titrate IV Lasix recheck chest x-ray wean high flow oxygen  2. Lumbar stenosis with neurogenic claudication  3. Charcot Arleen Tooth muscular atrophy  4. Osteoarthritis of lumbar spine with myelopathy,  Osteoarthritis  5.  Severe depression and anxiety -emotional support provided daily, vitamin B12, encourage participation in rehabilitation support group and recreational therapy, adjust/add medications (Celexa Ativan)  6.  Hypothyroidism-titrate Synthroid            Princess Ashish D.O., PM&R     Attending    [facility]

## 2021-02-10 NOTE — PROGRESS NOTES
INPATIENT PROGRESS NOTES    PATIENT NAME: Arnel Nicole  MRN: [id number]  SERVICE DATE:  February 9, 2021   SERVICE TIME:  8:25 PM      PRIMARY SERVICE: Pulmonary Disease    CHIEF COMPLAIN: Shortness of breath and chest pain      INTERVAL HPI: Patient seen and examined at bedside, Interval Notes, orders reviewed. Nursing notes noted  Patient still has  complain of chest pain and shortness of breath and hypoxic requiring 8  L oxygen. O2 saturation is 94 to 95%. No nausea vomiting diarrhea. No fever or chills. Patient was having shortness of breath and chest pain and could not do physical therapy. OBJECTIVE    Body mass index is 25.33 kg/m². PHYSICAL EXAM:  Vitals:  /86   Pulse 76   Temp 98 °F (36.7 °C)   Resp 19   Ht 5' 3\" (1.6 m)   Wt 143 lb (64.9 kg)   SpO2 94%   BMI 25.33 kg/m²   General: No nausea vomiting diarrhea or alert, awake . comfortable in bed, No distress. Head: Atraumatic , Normocephalic   Eyes: PERRL. No sclera icterus. No conjunctival injection. No discharge   ENT: No nasal  discharge. Pharynx clear. lips, teeth, mucosa and gums are normal, tongue protrudes in the midline  Neck:  Trachea midline. No thyromegaly, no JVD, No cervical adenopathy. Chest : Bilaterally symmetrical ,Normal effort,  No accessory muscle use  Lung : Diminished breath sound at left base. Bibasilar Rales. No wheezing. No rhonchi. Heart[de-identified] Normal  rate. Regular rhythm. No mumur ,  Rub or gallop  ABD: Non-tender. Non-distended. No masses. No organmegaly. Normal bowel sounds. No hernia.   Ext : No Pitting both leg , No Cyanosis No clubbing  Neuro: no focal weakness          DATA:   Recent Labs     02/08/21  0529 02/09/21  0547   WBC 8.3 9.8   HGB 11.0* 10.5*   HCT 33.4* 31.8*   MCV 87.6 87.8    278     Recent Labs     02/08/21  0529 02/09/21  0547    140   K 4.2 3.8    103   CO2 28 25   BUN 20 16   CREATININE 0.84 0.68   GLUCOSE 110* 116*   CALCIUM 8.4* 8.4*   LABGLOM >60.0 >60.0 GFRAA >60.0 >60.0       MV Settings:          No results for input(s): PHART, SUT3UOE, PO2ART, UAK5DJL, BEART, C6NSFTRA in the last 72 hours. O2 Device: Nasal cannula  O2 Flow Rate (L/min): 8 L/min    DIET LOW SODIUM 2 GM;     MEDICATIONS during current hospitalization:    Continuous Infusions:    Scheduled Meds:   [START ON 2/10/2021] furosemide  20 mg Intravenous BID    pantoprazole  40 mg Oral Once    pantoprazole  40 mg Oral BID AC    budesonide-formoterol  2 puff Inhalation BID    aspirin  81 mg Oral Daily    carvedilol  3.125 mg Oral BID    citalopram  20 mg Oral Daily    dilTIAZem  120 mg Oral Daily    levothyroxine  88 mcg Oral Daily    [Held by provider] losartan  25 mg Oral Daily    potassium chloride  20 mEq Oral Daily with breakfast    rivaroxaban  20 mg Oral Daily       PRN Meds:nitroGLYCERIN, acetaminophen **OR** [DISCONTINUED] acetaminophen, polyethylene glycol, promethazine **OR** ondansetron, sodium chloride flush, LORazepam, sodium chloride flush, simethicone    Radiology  Echo Complete 2d W Doppler W Color    Result Date: 1/27/2021  Transthoracic Echocardiography Report (TTE)  Demographics  Patient Name   Olivia Cook        Gender              Female                 Aby Strickland  Patient Number [id number]          Race                                                   Ethnicity  Visit Number   [id number]         Room Number         Z282  Corporate ID                     Date of Study       01/27/2021  Accession      [id number]        Referring Physician  Number  Date of Birth  07/12/1932        Sonographer         Charlette STRONG  Age            80 year(s)        Interpreting        [facility])                                   Physician           Cardiology                                                       Salma Gonsalves MD Procedure Type of Study  TTE procedure:ECHO COMPLETE 2D W/DOP W/COLOR.  Procedure Date Date: 01/27/2021 Start: 10:26 AM Study Location: Portable Technical Quality: Adequate visualization Indications:Congestive heart failure. Patient Status: Routine Height: 64 inches Weight: 165 pounds BSA: 1.8 m^2 BMI: 28.32 kg/m^2 BP: 108/77 mmHg  Conclusions  Summary  Mitral annular calcification is present. 1+ MR  Left ventricular ejection fraction is visually estimated at 40%. E/A flow reversal noted. Suggestive of diastolic dysfunction. Signature  ----------------------------------------------------------------  Electronically signed by Kirk Arguelles MD(Interpreting  physician) on 01/27/2021 11:37 AM  ----------------------------------------------------------------  Findings Left Ventricle Left ventricular ejection fraction is visually estimated at 40%. E/A flow reversal noted. Suggestive of diastolic dysfunction. Right Ventricle Normal right ventricle structure and function. Normal right ventricle systolic pressure. Left Atrium Moderately dilated left atrium. Right Atrium Moderately enlarged right atrium size. Mitral Valve Mitral annular calcification is present. 1+ MR Tricuspid Valve Normal tricuspid valve structure and function. 1-2+ TR RVSP 38 mmHg Aortic Valve Aortic valve leaflets are moderately thickened. No AS No AR Pulmonic Valve The pulmonic valve was not well visualized . Pericardial Effusion No evidence of pericardial effusion. Pleural Effusion No evidence of pleural effusion. Aorta \ Miscellaneous The aorta is within normal limits. M-Mode Measurements (cm)  LVIDd: 4.39 cm                         LVIDs: 3.55 cm  IVSd: 1.43 cm                          IVSs: 1.55 cm  LVPWd: 1.5 cm                          LVPWs: 1.48 cm  Rt. Vent.  Dimension: 3.74 cm           AO Root Dimension: 3.62 cm                                         ACS: 1.25 cm                                         LA: 4.54 cm                                         LVOT: 2.04 cm Doppler Measurements:  AV Velocity:0.02 m/s                    MV Peak E-Wave: 0.85 m/s  AV Peak Gradient: 10.44 mmHg MV Peak A-Wave: 0.87 m/s  AV Mean Gradient: 6.01 mmHg  AV Area (Continuity):1.83 cm^2  TR Velocity:2.62 m/s                    Estimated RAP:10 mmHg  TR Gradient:27.54 mmHg                  RVSP:37.54 mmHg Valves  Mitral Valve  Peak E-Wave: 0.85 m/s                 Peak A-Wave: 0.87 m/s                                        E/A Ratio: 0.97                                        Peak Gradient: 2.88 mmHg                                        Deceleration Time: 232.8 msec  Tissue Doppler  E' Septal Velocity: 0.07 m/s  E' Lateral Velocity: 0.08 m/s  Aortic Valve  Peak Velocity: 1.62 m/s                Mean Velocity: 1.15 m/s  Peak Gradient: 10.44 mmHg              Mean Gradient: 6.01 mmHg  Area (continuity): 1.83 cm^2           Area (2D): 1.8 cm^2  AV VTI: 28.8 cm  Cusp Separation: 1.25 cm  Tricuspid Valve  Estimated RVSP: 37.54 mmHg              Estimated RAP: 10 mmHg  TR Velocity: 2.62 m/s                   TR Gradient: 27.54 mmHg  Pulmonic Valve  Peak Velocity: 0.96 m/s           Peak Gradient: 3.66 mmHg                                    Estimated PASP: 37.54 mmHg  LVOT  Peak Velocity: 0.89 m/s              Mean Velocity: 0.57 m/s  Peak Gradient: 3.16 mmHg             Mean Gradient: 1.58 mmHg  LVOT Diameter: 2.04 cm               LVOT VTI: 16.15 cm Structures  Left Atrium  LA Dimension: 4.54 cm                        LA Area: 11.81 cm^2  LA/Aorta: 1.25  LA Volume/Index: 44.64 ml /25 m^2  Left Ventricle  Diastolic Dimension: 9.40 cm         Systolic Dimension: 2.67 cm  Septum Diastolic: 9.18 cm            Septum Systolic: 6.41 cm  PW Diastolic: 1.5 cm                 PW Systolic: 6.45 cm                                       FS: 19.1 %  LV EDV/LV EDV Index: 87.32 ml/49 m^2 LV ESV/LV ESV Index: 52.68 ml/29 m^2  EF Calculated: 39.7 %                LV Length: 6.12 cm  LVOT Diameter: 2.04 cm  Right Atrium  RA Systolic Pressure: 10 mmHg  Right Ventricle  Diastolic Dimension: 9.21 cm thromboembolic disease.        - Segmental pulmonary arteries:  No thromboembolic disease.        - Subsegmental pulmonary arteries:  Not well visualized due to motion. Lines, tubes, and devices:  None. Lung parenchyma and pleura: Tortuous course of the trachea. Central airways appear grossly patent. Moderate left and small right pleural effusions with associated atelectasis. Limitations in evaluation of the lung parenchyma secondary to motion. Other areas of probable scarring/atelectasis. No pneumothorax. Thoracic inlet, heart, and mediastinum: Visualized thyroid unremarkable. No axillary, mediastinal, or hilar lymphadenopathy. Ascending thoracic aorta aneurysmal, measuring around 5.0 cm, similar to prior. Descending thoracic aorta aneurysmal and measures  up to 4.8 cm, also similar to prior. Normal pulmonary artery size. Cardiomegaly, similar to prior Scattered coronary artery calcifications. Small pericardial effusion. Small hiatal hernia. Bones and soft tissues:  No destructive bone lesion or acute osseous findings. Osteopenia. Scoliosis and kyphosis and multilevel degenerative changes, grossly similar to prior. Chest wall unremarkable. Upper abdomen:  No acute abnormality in the imaged upper abdomen. Reflux of contrast into the hepatic veins, associated with right heart failure. Lower neck: Unremarkable. No CT evidence of pulmonary embolism. Moderate left and small right pleural effusions. Cardiomegaly and small pericardial effusion. Reflux of contrast into the hepatic veins, associated with right heart failure. 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 No lung consolidative opacity. Areas of probable atelectasis/scarring. Ct Abdomen Pelvis W Iv Contrast    Result Date: 2/8/2021  EXAMINATION: CT ABDOMEN PELVIS W IV CONTRAST DATE AND TIME:2/8/2021 10:36 AM CLINICAL HISTORY: Acute abdominal pain. Epigastric pain. epigastric pain  COMPARISON: None available. TECHNIQUE: Contiguous axial CT sections of the abdomen and pelvis. 100 cc's of IV contrast given. .  All CT scans at this facility use dose modulation, iterative reconstruction, and/or weight based dosing when appropriate to reduce radiation dose to as low as reasonably achievable. FINDINGS   Liver: Small hypodensities in the right hepatic lobe the largest measuring 8 mm consistent with small cysts. Spleen: Negative    Pancreas: Negative     Kidney: Negative   Adrenal glands are negative. Bowel: Negative    Nodes: No lymphadenopathy. Aorta: Dilated descending thoracic aorta maximum diameter approximately 4 cm adjacent appearance from the CT scans from January 25, 2021. No infrarenal abdominal aortic aneurysm. Peritoneum: No free fluid or free air. The abdominal wall is intact. Pelvis : 9.4 x 6.5 x 6.6 cm cystic mass in the left adnexa. This appears to be a chronic finding this patient was present on an MRI scan that included this area back in April 2016. Bladder catheter in place with decompressed bladder. Bones: No acute osseous abnormalities. Lung bases: Persistent bibasilar effusions not atelectasis left greater than right. PERSISTENT BIBASILAR PLEURAL-PARENCHYMAL CHANGES. NO ACUTE PATHOLOGY IN THE ABDOMEN OR PELVIS. Xr Chest Portable    Result Date: 2/8/2021  Exam: XR CHEST PORTABLE History: Shortness of breath. Chest pain. Technique: AP portable view of the chest obtained. Comparison: Portable chest radiograph from February 7, 2021 Findings: Heart size is enlarged. Small bilateral pleural effusions. Bibasilar opacities, left greater than right. No pneumothorax. No acute osseous abnormality. Small bilateral pleural effusions, left greater than right. Bibasilar atelectasis and/or infiltrate, left greater than right.      Xr Chest Portable  Result Date: 2/8/2021  EXAMINATION: XR CHEST PORTABLE REASON FOR EXAM: chest pain FINDINGS: Frontal view of the chest reveals cardiomegaly and prominent central pulmonary vasculature consistent with chronic congestion. Findings similar and unchanged from 2/3/2021. Opacification at the left base consistent with residual pneumonitis/atelectasis remains unchanged from previous study 4 days earlier. PNEUMONIA/ATELECTASIS LEFT BASE. CARDIOMEGALY. MILD CHRONIC CENTRAL VASCULAR CONGESTION. NO SIGNIFICANT CHANGE FROM 2/3/2020     Xr Chest Portable    Result Date: 2/4/2021  EXAMINATION: XR CHEST PORTABLE CLINICAL HISTORY: SHORTNESS OF BREATH, CHEST PAIN COMPARISONS: CTA CHEST, JANUARY 25, 2021. CHEST RADIOGRAPHS JANUARY 28, JANUARY 29, 2021. FINDINGS: Osseous structures intact. Cardiopericardial silhouette enlarged and unchanged. Cephalization pulmonary vasculature. No focal airspace disease. Increased opacity left lower lung obscures left diaphragm. STABLE MARKED CARDIOMEGALY. PROBABLE INTERSTITIAL EDEMA. LEFT LOWER LUNG ATELECTASIS/PNEUMONIA. Xr Chest Portable    Result Date: 1/28/2021  EXAMINATION: CHEST PORTABLE VIEW  CLINICAL HISTORY: Short of breath COMPARISONS: None  FINDINGS: Single  views of the chest is submitted. The cardiac silhouette is enlarged. Pulmonary vascular unremarkable. Right sided trachea. Left lower lobe infiltrate/consolidation, collapse with left sided pleural effusion. LEFT LOWER LOBE INFILTRATE/CONSOLIDATION, COLLAPSE WITH LEFT SIDED PLEURAL EFFUSION    Xr Chest Portable    Result Date: 1/26/2021  EXAMINATION: Portable AP ERECT view of the chest. CLINICAL HISTORY:  SOB , Negative Covid-19 test. DATE: 1/25/2021 5:41 PM COMPARISONS: 7/24/2017 FINDINGS: The heart is moderately enlarged, similar to prior exam.  Prominent interstitial markings, consistent with increasing Central vascular congestion.  The costophrenic angles are blunted secondary to pleural effusions bilaterally, left greater than right. No pneumothorax. There are infiltrates/atelectasis within lung bases, left greater than right. There are moderate degenerative changes in spine. CARDIAC ENLARGEMENT WITH CENTRAL VESSEL CONGESTION AND BILATERAL PLEURAL EFFUSIONS, POSSIBLE CONGESTIVE HEART FAILURE. BIBASILAR INFILTRATES/ATELECTASIS, LEFT GREATER THAN RIGHT. Ir Guided Thoracentesis Pleural    Technically successful ultrasound-guided thoracentesis without immediate complications. HISTORY: Xochilt Cope is a Female of 80 years age. DIAGNOSIS: Left pleural effusion COMMENTS: I50.43 Acute on chronic combined systolic and diastolic CHF (congestive heart failure) (HCC) ICD10 PROCEDURE: Following the discussion of procedure, risks versus benefits, possible complications, informed consent was obtained. Following universal protocol, patient and site verification was performed with a \"timeout\" prior to the procedure. Brief survey of the patient's left hemithorax demonstrate moderate amount of pleural effusion. After selection of an appropriate site for thoracentesis, the thoracic skin was prepped and draped in usual sterile fashion. Under ultrasound guidance, using lidocaine for local anesthesia, a 19-gauge Yueh catheter was inserted in the pleural cavity until effusion was aspirated. Using Vacutainer bottles, 755 mL of clear yellow effusion was drained. The catheter was removed and the aspiration site dressed. The patient tolerated procedure well. No immediate complications were identified. Subsequent chest radiograph demonstrated no evidence of pneumothorax. The patient left the radiology department in stable condition. Radiology nurse monitored patient's  vital signs throughout the procedure which lasted approximately 30 minutes. IMPRESSION AND SUGGESTION:  1. Exudative lymphocyte predominant effusion. Cytology is negative, reactive mesothelial cells  2. Hypoxia and shortness of breath likely related to underlying A. fib/diastolic dysfunction and pleural effusion    Still having short of breath and chest pain. Discussed with Dr. Arley Jiménez, cardiac cath was okay. If pleural fluid increase may consider repeat thoracentesis and repeat the cytology. .  May consider sleep study as outpatient. PFT as outpatient. At this time recommend O2 to keep saturation 90% or above. Incentive spirometer and flutter valve.     Electronically signed by Ada Wilson MD, FCCP on 2/9/2021 at 8:25 PM

## 2021-02-10 NOTE — PROGRESS NOTES
Report called to [facility]w. Pt changed over to [facility] for transfer as hi-flow tubing does not connect. HS meds administered. VSS. Belongings packed. LBM 2/8/21. No distress noted.   Electronically signed by Hector Corral RN on 2/9/2021 at 7:53 PM

## 2021-02-10 NOTE — CONSULTS
myelopathy    Osteoarthritis    Myelopathy (HCC)    Impaired mobility and activities of daily living due to CHF gutierrez, [facility] admit 2021    Headache    Depression    Ascending aortic aneurysm (HCC)    Descending aortic aneurysm (HCC)    Dizziness    Shortness of breath    Essential hypertension    Acute on chronic combined systolic and diastolic CHF (congestive heart failure) (HCC)    Gait abnormality    Paroxysmal atrial fibrillation (HCC)    Pleural effusion    Hypoxia    Acute pulmonary edema (HCC)    Acute on chronic combined systolic (congestive) and diastolic (congestive) heart failure (HCC)       Past Surgical History:   Procedure Laterality Date    JOINT REPLACEMENT      THORACENTESIS Left 01/29/2021    total of 755 cc removed per Dr Bárbara Quiñonez specimen sent to lab       Social History     Socioeconomic History    Marital status:      Spouse name: Not on file    Number of children: Not on file    Years of education: Not on file    Highest education level: Not on file   Occupational History    Not on file   Social Needs    Financial resource strain: Not on file    Food insecurity     Worry: Not on file     Inability: Not on file    Transportation needs     Medical: Not on file     Non-medical: Not on file   Tobacco Use    Smoking status: Never Smoker    Smokeless tobacco: Never Used   Substance and Sexual Activity    Alcohol use: No     Alcohol/week: 0.0 standard drinks    Drug use: No    Sexual activity: Not on file   Lifestyle    Physical activity     Days per week: 0 days     Minutes per session: 0 min    Stress: Only a little   Relationships    Social connections     Talks on phone: More than three times a week     Gets together: More than three times a week     Attends [employer] service: 1 to 4 times per year     Active member of club or organization: No     Attends meetings of clubs or organizations: Never     Relationship status:      Intimate partner violence     Fear of current or ex partner: No     Emotionally abused: No     Physically abused: No     Forced sexual activity: No   Other Topics Concern    Not on file   Social History Narrative    Lives With: Alone, son lives down the street, dtr is in the area    Has a son in the area    Type of Home: [address]: One level    Home Access: Stairs to enter with rails- Number of Steps: 2- Rails: Both    Bathroom Shower/Tub: Tub/Shower unit, Bathroom Equipment: Grab bars in shower, Shower chair    Home Equipment: Rolling walker, Cane(Pt infrequemtly uses DME for ambulation and prefers to furniture walk in home)    ADL Assistance: Independent, [address]: [facility] [address] Responsibilities: Yes    Ambulation Assistance: Independent, Transfer Assistance: Independent    Additional Comments: Son stops over 2 times daily           Family History   Problem Relation Age of Onset    Arthritis Mother     Arthritis Father     High Blood Pressure Father        Current Facility-Administered Medications   Medication Dose Route Frequency Provider Last Rate Last Admin    citalopram (CELEXA) tablet 20 mg  20 mg Oral Daily Pro Dasilva MD        levothyroxine (SYNTHROID) tablet 88 mcg  88 mcg Oral Daily Pro Dasilva MD   88 mcg at 02/10/21 2981    aspirin chewable tablet 81 mg  81 mg Oral Daily Pro Dasilva MD        vitamin B-12 (CYANOCOBALAMIN) tablet 1,000 mcg  1,000 mcg Oral Daily Pro Dasilva MD        dilTIAZem [facility] CD) extended release capsule 120 mg  120 mg Oral BID Pro Dasilva MD        sodium chloride flush 0.9 % injection 10 mL  10 mL Intravenous 2 times per day Pro Dasilva MD   10 mL at 02/09/21 2130    sodium chloride flush 0.9 % injection 10 mL  10 mL Intravenous PRN Pro Dasilva MD        promethazine (PHENERGAN) tablet 12.5 mg  12.5 mg Oral Q6H PRN Pro Dasilva MD        Or    ondansetron [facility] injection 4 mg  4 mg Intravenous Q6H PRN Calos Burton MD        polyethylene glycol [facility]) packet 17 g  17 g Oral Daily PRN Calos Burton MD        acetaminophen (TYLENOL) tablet 650 mg  650 mg Oral Q6H PRN Calos Burton MD        Or    acetaminophen (TYLENOL) suppository 650 mg  650 mg Rectal Q6H PRN Calos Burton MD        enoxaparin (LOVENOX) injection 40 mg  40 mg Subcutaneous Daily Calos Burton MD        furosemide (LASIX) injection 20 mg  20 mg Intravenous BID Calos Burton MD           ALLERGIES: Latex and Tape Robbi Idania tape]    Review of Systems   Constitutional: Negative. Negative for chills and fever. HENT: Negative. Eyes: Negative. Respiratory: Positive for shortness of breath. Negative for wheezing. Cardiovascular: Positive for chest pain. Negative for palpitations and leg swelling. Gastrointestinal: Negative. Negative for abdominal pain, nausea and vomiting. Endocrine: Negative. Genitourinary: Negative. Musculoskeletal: Negative. Skin: Negative. Negative for rash. Allergic/Immunologic: Negative. Neurological: Negative for dizziness, weakness and headaches. Hematological: Negative. Psychiatric/Behavioral: Negative. VITALS:  Blood pressure 117/71, pulse 69, temperature 98.2 °F (36.8 °C), weight 163 lb 12.8 oz (74.3 kg), SpO2 96 %. Body mass index is 29.02 kg/m². Physical Exam   Constitutional: She is oriented to person, place, and time. She appears well-developed and well-nourished. HENT:   Head: Normocephalic and atraumatic. Eyes: Pupils are equal, round, and reactive to light. Neck: Normal range of motion. Neck supple. No JVD present. No tracheal deviation present. No thyromegaly present. Cardiovascular: Normal rate, regular rhythm, normal heart sounds and intact distal pulses. PMI is not displaced. Exam reveals no gallop, no S3, no distant heart sounds and no friction rub. No murmur heard.   Pulmonary/Chest: No respiratory distress. She has no wheezes. She has no rales. She exhibits no tenderness. Abdominal: Soft. Bowel sounds are normal. She exhibits no distension and no mass. There is no abdominal tenderness. There is no rebound and no guarding. Musculoskeletal:         General: No edema. Neurological: She is alert and oriented to person, place, and time. No cranial nerve deficit. Skin: Skin is warm and dry. No rash noted. She is not diaphoretic. No erythema. No pallor. Psychiatric: She has a normal mood and affect.  Her behavior is normal. Judgment and thought content normal.       LABS:  Recent Results (from the past 24 hour(s))   COVID-19    Collection Time: 02/09/21  3:33 PM   Result Value Ref Range    SARS-CoV-2, NAAT Not Detected Not Detected   EKG 12 Lead    Collection Time: 02/10/21 12:51 AM   Result Value Ref Range    Ventricular Rate 70 BPM    Atrial Rate 70 BPM    P-R Interval 204 ms    QRS Duration 150 ms    Q-T Interval 466 ms    QTc Calculation (Bazett) 503 ms    P Axis 59 degrees    R Axis -61 degrees    T Axis -59 degrees   Basic Metabolic Panel    Collection Time: 02/10/21  5:47 AM   Result Value Ref Range    Sodium 137 135 - 144 mEq/L    Potassium 4.0 3.4 - 4.9 mEq/L    Chloride 99 95 - 107 mEq/L    CO2 27 20 - 31 mEq/L    Anion Gap 11 9 - 15 mEq/L    Glucose 134 (H) 70 - 99 mg/dL    BUN 15 8 - 23 mg/dL    CREATININE 0.81 0.50 - 0.90 mg/dL    GFR Non-African American >60.0 >60    GFR  >60.0 >60    Calcium 8.5 8.5 - 9.9 mg/dL   Magnesium    Collection Time: 02/10/21  5:47 AM   Result Value Ref Range    Magnesium 1.7 1.7 - 2.4 mg/dL   Lipid panel - fasting    Collection Time: 02/10/21  5:47 AM   Result Value Ref Range    Cholesterol, Total 123 0 - 199 mg/dL    Triglycerides 78 0 - 150 mg/dL    HDL 39 (L) 40 - 59 mg/dL    LDL Calculated 68 0 - 129 mg/dL     Troponin:   Lab Results   Component Value Date    TROPONINI <0.010 01/26/2021       EKG: normal sinus rhythm, nonspecific ST and T waves changes      ASSESSMENT:        Acute on chronic decompensated combined congestive heart failure. NICMP EF of 40%. Paroxysmal atrial fibrillation- normal sinus rhythm now. History of coronary disease- Mod LAD disease, negative IFR. History of SVT status post ablation  History of ascending and descending thoracic aortic aneurysm-stable  Essential hypertension  Left pleural effusion status post thoracentesis.       PLAN:   1. As always, aggressive risk factor modification is strongly recommended. We should adhere to the JNC VIII guidelines for HTN management and the NCEPATP III guidelines for LDL-C management. 2. Maintain taylor./Urology recommendation  3. Continue with Lasix IV., monitor I's and O's and renal function. Increase Lasix to 20 mg IV every 12 hours  4. Max cardiac meds-holding ARB and BB due to hypotension. 5. Full dose anticoagulation, Xarelto, DC sub Q lovenox. 6. Pulmonary recommendations- follow up CT of chest in near future. 7. CHF teaching and follow up in CHF clinic post discharge  8. Low sodium diet.     9. Will follow    Electronically signed by Neil Jacobson DO on 2/10/2021 at 8:07 AM

## 2021-02-10 NOTE — PROGRESS NOTES
Hospitalist Progress Note      PCP: Wagner Castillo MD    Date of Admission: 2/9/2021    Subjective: :  Patient was admitted from [facility] yesterday with hypoxia. She is being diuresed for acute on chronic congestive heart failure  Today patient reports shortness of breath slightly better. Patient denies chest pain, abdominal pain     Medications:  Reviewed    Infusion Medications   Scheduled Medications    citalopram  20 mg Oral Daily    levothyroxine  88 mcg Oral Daily    aspirin  81 mg Oral Daily    vitamin B-12  1,000 mcg Oral Daily    dilTIAZem  120 mg Oral BID    rivaroxaban  15 mg Oral Daily    budesonide-formoterol  2 puff Inhalation BID    carvedilol  3.125 mg Oral BID    pantoprazole  40 mg Oral BID AC    [START ON 2/11/2021] potassium chloride  20 mEq Oral Daily with breakfast    sodium chloride flush  10 mL Intravenous 2 times per day    furosemide  20 mg Intravenous BID     PRN Meds: LORazepam, nitroGLYCERIN, simethicone, sodium chloride flush, promethazine **OR** ondansetron, polyethylene glycol, acetaminophen **OR** acetaminophen      Intake/Output Summary (Last 24 hours) at 2/10/2021 1857  Last data filed at 2/10/2021 1650  Gross per 24 hour   Intake --   Output 1200 ml   Net -1200 ml       Exam:    /60   Pulse 77   Temp 98.2 °F (36.8 °C) (Oral)   Resp 18   Ht 5' 3\" (1.6 m)   Wt 163 lb 12.8 oz (74.3 kg)   SpO2 94%   BMI 29.02 kg/m²     General appearance: On high flow oxygen, talking in sentences, no respiratory distress  HEENT:  Conjunctivae/corneas clear. Neck: Supple, with full range of motion. Respiratory:  Normal respiratory effort. Clear to auscultation, bilaterally without Rales/Wheezes/Rhonchi. Cardiovascular: Regular rate and rhythm with normal S1/S2 without murmurs, rubs or gallops. Abdomen: Soft, non-tender, non-distended with normal bowel sounds. Musculoskeletal: No clubbing, cyanosis or edema bilaterally.     Skin: Skin color, texture, turgor normal. No rashes or lesions. Neuro: Alert, moving all extremities      Labs:   Recent Labs     02/08/21  0529 02/09/21  0547   WBC 8.3 9.8   HGB 11.0* 10.5*   HCT 33.4* 31.8*    278     Recent Labs     02/08/21  0529 02/09/21  0547 02/10/21  0547    140 137   K 4.2 3.8 4.0    103 99   CO2 28 25 27   BUN 20 16 15   CREATININE 0.84 0.68 0.81   CALCIUM 8.4* 8.4* 8.5     No results for input(s): AST, ALT, BILIDIR, BILITOT, ALKPHOS in the last 72 hours. No results for input(s): INR in the last 72 hours. No results for input(s): Carmelina Ripper in the last 72 hours. Urinalysis:      Lab Results   Component Value Date    NITRU Negative 02/07/2021    WBCUA 3-5 02/07/2021    BACTERIA Negative 02/07/2021    RBCUA 0-2 02/07/2021    BLOODU TRACE 02/07/2021    SPECGRAV 1.019 02/07/2021    GLUCOSEU Negative 02/07/2021       Radiology:  XR CHEST PORTABLE   Final Result   PULMONARY VASCULAR REMAINS MILDLY CONGESTED. COULD SUGGEST COMPONENT OF UNDERLYING CHF. CORRELATE CLINICALLY   PATCHY TO COALESCENT INFILTRATE WITHIN THE RIGHT LOWER LOBE AS WELL AS AN AREA OF INFILTRATE/CONSOLIDATION LEFT LOWER LOBE WITH TRACE RIGHT PLEURAL EFFUSION AND LEFT PLEURAL EFFUSION. [facility]             Assessment/Plan:    Active Hospital Problems    Diagnosis Date Noted    Paroxysmal atrial fibrillation (HCC) [I48.0]      Priority: High    Impaired mobility and activities of daily living due to CHF [facility] admit 2021 [Z74.09, Z78.9]      Priority: High    Lumbar stenosis with neurogenic claudication [M48.062] 06/02/2016     Priority: Medium    Acute on chronic combined systolic (congestive) and diastolic (congestive) heart failure (HCC) [I50.43] 02/09/2021    Hypoxia [R09.02]     Acute on chronic combined systolic and diastolic CHF (congestive heart failure) ([facility] [address] [I50.43] 01/25/2021    Osteoarthritis of lumbar spine with myelopathy [M47.16] 06/02/2016     80year-old female with history of systolic heart failure, thoracic

## 2021-02-10 NOTE — H&P
Hospital Medicine  History and Physical    Patient:  Annetta Morrow  MRN: [id number]    CHIEF COMPLAINT:  No chief complaint on file. History Obtained From:  patient  Primary Care Physician: Sol Pena MD    HISTORY OF PRESENT ILLNESS:   The patient is a 80 y.o. female who presents from [facility] after inability to participate in daily therapy. Upon evaluation, patient found to have worsening sob with activity, now with worsening at rest.  Denies fever, chills, cp. She has a known hx of combined CHF. Past Medical History:      Diagnosis Date    Ascending aortic aneurysm ([facility] [address] 6/23/2017    Charcot Arleen Tooth muscular atrophy     Depression     Descending aortic aneurysm ([facility] [address] 6/23/2017    Essential hypertension 2/16/2018    Headache     HTN (hypertension)     Impaired mobility and activities of daily living     Lumbar stenosis with neurogenic claudication     Myelopathy ([facility] [address]     Osteoarthritis        Past Surgical History:      Procedure Laterality Date    JOINT REPLACEMENT      THORACENTESIS Left 01/29/2021    total of 755 cc removed per Dr Ibis Vale specimen sent to lab       Medications Prior to Admission:    Prior to Admission medications    Medication Sig Start Date End Date Taking?  Authorizing Provider   dilTIAZem (CARDIZEM CD) 120 MG extended release capsule Take 1 capsule by mouth 2 times daily 12/23/20   Jorge Franco,    Boswellia-Glucosamine-Vit D (OSTEO BI-FLEX ONE PER DAY) TABS Take by mouth daily    Historical Provider, MD   Calcium Carbonate-Vitamin D (CALTRATE 600+D PO) Take by mouth daily    Historical Provider, MD   Multiple Vitamins-Minerals (CENTRUM SILVER ULTRA WOMENS) TABS Take by mouth daily    Historical Provider, MD   vitamin B-12 (CYANOCOBALAMIN) 1000 MCG tablet Take 1,000 mcg by mouth daily    Historical Provider, MD   KLOR-CON M20 20 MEQ extended release tablet TAKE 1 TABLET DAILY WITH BREAKFAST (MUST CALL OFFICE FOR FOLLOW UP) 7/22/20   Jorge Franco DO hydrALAZINE (APRESOLINE) 10 MG tablet TAKE 1 TABLET IN THE EVENING. repeat dose IN THE MORNING if systolic >152 1/51/04   Historical Provider, MD   aspirin 81 MG tablet Take 81 mg by mouth daily     Historical Provider, MD   losartan (COZAAR) 50 MG tablet Take 50 mg by mouth 2 times daily  4/27/17   Historical Provider, MD   citalopram (CELEXA) 20 MG tablet Take 20 mg by mouth daily  4/1/16   Historical Provider, MD   SYNTHROID 88 MCG tablet Take 88 mcg by mouth daily  3/19/16   Historical Provider, MD       Allergies:  Latex and Tape [adhesive tape]    Social History:   TOBACCO:   reports that she has never smoked. She has never used smokeless tobacco.  ETOH:   reports no history of alcohol use. Family History:       Problem Relation Age of Onset    Arthritis Mother     Arthritis Father     High Blood Pressure Father        REVIEW OF SYSTEMS:  Ten systems reviewed and negative except for as above. Physical Exam:    Vitals: There were no vitals taken for this visit. Constitutional: alert, appears stated age and cooperative  Skin: Skin color, texture, turgor normal. No rashes or lesions  Eyes:Eye: Normal external eye, conjunctiva, ORIN. ENT: Head: Normocephalic, no lesions, without obvious abnormality. Neck: no adenopathy, no carotid bruit, no JVD, supple, symmetrical, trachea midline and thyroid not enlarged, symmetric, no tenderness/mass/nodules  Respiratory: bibasilar crackles, no wheezes  Cardiovascular: regular rate and rhythm, S1, S2 normal, no murmur, click, rub or gallop  Gastrointestinal: soft, non-tender; bowel sounds normal; no masses,  no organomegaly  Genitourinary: Deferred  Musculoskeletal:extremities normal, atraumatic, no cyanosis or edema  Neurologic: Mental status AAOx3 No facial asymmetry or droop. Normal muscle strength b/l. CN II-XII grossly intact. Psychiatric: Appropriate mood and affect.  Good insight and judgement  Hematologic: No obvious bruising or bleeding    Recent Labs 02/07/21  0550 02/08/21  0529 02/09/21  0547   WBC 7.4 8.3 9.8   HGB 10.1* 11.0* 10.5*    278 278     Recent Labs     02/07/21  0550 02/08/21  0529 02/09/21  0547    137 140   K 3.9 4.2 3.8    100 103   CO2 24 28 25   BUN 17 20 16   CREATININE 0.61 0.84 0.68   GLUCOSE 97 110* 116*     Troponin T: No results for input(s): TROPONINI in the last 72 hours. URINALYSIS:  Recent Labs     02/07/21  0148   COLORU Yellow   PHUR 5.0   WBCUA 3-5   RBCUA 0-2   BACTERIA Negative   CLARITYU Clear   SPECGRAV 1.019   LEUKOCYTESUR TRACE*   UROBILINOGEN 0.2   BILIRUBINUR Negative   BLOODU TRACE*   GLUCOSEU Negative     -----------------------------------------------------------------   Xr Chest Portable    Result Date: 2/4/2021  EXAMINATION: XR CHEST PORTABLE CLINICAL HISTORY: SHORTNESS OF BREATH, CHEST PAIN COMPARISONS: CTA CHEST, JANUARY 25, 2021. CHEST RADIOGRAPHS JANUARY 28, JANUARY 29, 2021. FINDINGS: Osseous structures intact. Cardiopericardial silhouette enlarged and unchanged. Cephalization pulmonary vasculature. No focal airspace disease. Increased opacity left lower lung obscures left diaphragm. STABLE MARKED CARDIOMEGALY. PROBABLE INTERSTITIAL EDEMA. LEFT LOWER LUNG ATELECTASIS/PNEUMONIA. Assessment and Plan   1. Acute on chronic combined CHF  1. 2/9 - IV lasix bid, transfer to  on telemetry for closer monitoring. Cardiology on consultation; recent cath unremarkable  2. Acute on chronic hypoxic resp failure  1. 2/9 - worsened while on acute rehab floor, wean as able, pulm on consultation as well  3. PAF: Cardiology following. Rate controlled. Goal K and Mg 4.0 and 2.0, respectively. On Long term anticoagulation  4. HTN: Cardiology managing. Cozaar on hold. 5. CAD: Cardiology following and managing. 6. Pleural effusion: S/p thoracentesis. Concerned for malignancy. Pulmonary consult. Will need frequent monitoring.       Patient Active Problem List   Diagnosis Code    Lumbar stenosis

## 2021-02-11 LAB
ANION GAP SERPL CALCULATED.3IONS-SCNC: 10 MEQ/L (ref 9–15)
BUN BLDV-MCNC: 17 MG/DL (ref 8–23)
CALCIUM SERPL-MCNC: 8.8 MG/DL (ref 8.5–9.9)
CHLORIDE BLD-SCNC: 100 MEQ/L (ref 95–107)
CO2: 32 MEQ/L (ref 20–31)
CREAT SERPL-MCNC: 0.76 MG/DL (ref 0.5–0.9)
GFR AFRICAN AMERICAN: >60
GFR NON-AFRICAN AMERICAN: >60
GLUCOSE BLD-MCNC: 112 MG/DL (ref 70–99)
HCT VFR BLD CALC: 33.2 % (ref 37–47)
HEMOGLOBIN: 11 G/DL (ref 12–16)
MAGNESIUM: 1.8 MG/DL (ref 1.7–2.4)
MCH RBC QN AUTO: 28.8 PG (ref 27–31.3)
MCHC RBC AUTO-ENTMCNC: 33.1 % (ref 33–37)
MCV RBC AUTO: 87.1 FL (ref 82–100)
PDW BLD-RTO: 14.2 % (ref 11.5–14.5)
PLATELET # BLD: 289 K/UL (ref 130–400)
POTASSIUM SERPL-SCNC: 3.7 MEQ/L (ref 3.4–4.9)
RBC # BLD: 3.81 M/UL (ref 4.2–5.4)
SODIUM BLD-SCNC: 142 MEQ/L (ref 135–144)
WBC # BLD: 10 K/UL (ref 4.8–10.8)

## 2021-02-11 PROCEDURE — 6370000000 HC RX 637 (ALT 250 FOR IP): Performed by: INTERNAL MEDICINE

## 2021-02-11 PROCEDURE — 99231 SBSQ HOSP IP/OBS SF/LOW 25: CPT | Performed by: PHYSICAL MEDICINE & REHABILITATION

## 2021-02-11 PROCEDURE — 80048 BASIC METABOLIC PNL TOTAL CA: CPT

## 2021-02-11 PROCEDURE — 99231 SBSQ HOSP IP/OBS SF/LOW 25: CPT | Performed by: UROLOGY

## 2021-02-11 PROCEDURE — 2060000000 HC ICU INTERMEDIATE R&B

## 2021-02-11 PROCEDURE — 6370000000 HC RX 637 (ALT 250 FOR IP): Performed by: FAMILY MEDICINE

## 2021-02-11 PROCEDURE — 2700000000 HC OXYGEN THERAPY PER DAY

## 2021-02-11 PROCEDURE — 99232 SBSQ HOSP IP/OBS MODERATE 35: CPT | Performed by: INTERNAL MEDICINE

## 2021-02-11 PROCEDURE — 83735 ASSAY OF MAGNESIUM: CPT

## 2021-02-11 PROCEDURE — 2580000003 HC RX 258: Performed by: FAMILY MEDICINE

## 2021-02-11 PROCEDURE — 94761 N-INVAS EAR/PLS OXIMETRY MLT: CPT

## 2021-02-11 PROCEDURE — 85027 COMPLETE CBC AUTOMATED: CPT

## 2021-02-11 PROCEDURE — 93010 ELECTROCARDIOGRAM REPORT: CPT | Performed by: INTERNAL MEDICINE

## 2021-02-11 PROCEDURE — 36415 COLL VENOUS BLD VENIPUNCTURE: CPT

## 2021-02-11 PROCEDURE — 94640 AIRWAY INHALATION TREATMENT: CPT

## 2021-02-11 PROCEDURE — 93005 ELECTROCARDIOGRAM TRACING: CPT | Performed by: INTERNAL MEDICINE

## 2021-02-11 RX ORDER — FUROSEMIDE 20 MG/1
20 TABLET ORAL DAILY
Status: DISCONTINUED | OUTPATIENT
Start: 2021-02-12 | End: 2021-02-19 | Stop reason: HOSPADM

## 2021-02-11 RX ADMIN — CITALOPRAM HYDROBROMIDE 20 MG: 20 TABLET ORAL at 09:17

## 2021-02-11 RX ADMIN — BUDESONIDE AND FORMOTEROL FUMARATE DIHYDRATE 2 PUFF: 160; 4.5 AEROSOL RESPIRATORY (INHALATION) at 07:52

## 2021-02-11 RX ADMIN — BUDESONIDE AND FORMOTEROL FUMARATE DIHYDRATE 2 PUFF: 160; 4.5 AEROSOL RESPIRATORY (INHALATION) at 19:30

## 2021-02-11 RX ADMIN — ASPIRIN 81 MG: 81 TABLET, CHEWABLE ORAL at 09:16

## 2021-02-11 RX ADMIN — LEVOTHYROXINE SODIUM 88 MCG: 0.09 TABLET ORAL at 06:23

## 2021-02-11 RX ADMIN — Medication 10 ML: at 09:17

## 2021-02-11 RX ADMIN — Medication 10 ML: at 21:24

## 2021-02-11 RX ADMIN — PANTOPRAZOLE SODIUM 40 MG: 40 TABLET, DELAYED RELEASE ORAL at 06:23

## 2021-02-11 RX ADMIN — POTASSIUM CHLORIDE 20 MEQ: 20 TABLET, EXTENDED RELEASE ORAL at 09:17

## 2021-02-11 RX ADMIN — DILTIAZEM HYDROCHLORIDE 120 MG: 120 CAPSULE, COATED, EXTENDED RELEASE ORAL at 21:24

## 2021-02-11 RX ADMIN — CYANOCOBALAMIN TAB 500 MCG 1000 MCG: 500 TAB at 09:16

## 2021-02-11 RX ADMIN — PANTOPRAZOLE SODIUM 40 MG: 40 TABLET, DELAYED RELEASE ORAL at 17:12

## 2021-02-11 RX ADMIN — RIVAROXABAN 15 MG: 15 TABLET, FILM COATED ORAL at 17:11

## 2021-02-11 ASSESSMENT — PAIN SCALES - GENERAL
PAINLEVEL_OUTOF10: 0

## 2021-02-11 NOTE — FLOWSHEET NOTE
Pt sleeping. I woke her up at this time to do assessment and give her her night time meds. She is resting in bed with no complaints at this time.

## 2021-02-11 NOTE — CONSULTS
UROLOGY CONSULT Progress Note-Dr Pedraza    2/11/2021   3:28 PM    Name:  Mercedes Ritter  MRN:    [id number]     [place]:     [de-identified]   Room:[address] Day: 2     Admit Date: 2/9/2021  8:59 PM  PCP: Simone Moss MD    Subjective:     C/C: Urinary retention    Interval History: Status: Patient was evaluated in rehab  History of intermittent urinary retention  Currently admitted to telemetry floor due to cardiac issues    Past Medical History:   Diagnosis Date    Ascending aortic aneurysm ([facility] 6/23/2017    Charcot Arleen Tooth muscular atrophy     Depression     Descending aortic aneurysm ([facility] 6/23/2017    Essential hypertension 2/16/2018    Headache     HTN (hypertension)     Impaired mobility and activities of daily living     Lumbar stenosis with neurogenic claudication     [facility]     Osteoarthritis        ROS:  Review of Systems    Medications: Allergies:    Allergies   Allergen Reactions    Latex     [employer]]        Current Meds:     [START ON 2/12/2021] furosemide (LASIX) tablet 20 mg, Daily      citalopram (CELEXA) tablet 20 mg, Daily      levothyroxine (SYNTHROID) tablet 88 mcg, Daily      aspirin chewable tablet 81 mg, Daily      vitamin B-12 (CYANOCOBALAMIN) tablet 1,000 mcg, Daily      dilTIAZem (CARDIZEM CD) extended release capsule 120 mg, BID      rivaroxaban (XARELTO) tablet 15 mg, Daily      budesonide-formoterol (SYMBICORT) 160-4.5 MCG/ACT inhaler 2 puff, BID      [Held by provider] carvedilol (COREG) tablet 3.125 mg, BID      LORazepam (ATIVAN) tablet 0.5 mg, Q6H PRN      nitroGLYCERIN (NITROSTAT) SL tablet 0.4 mg, Q5 Min PRN      pantoprazole (PROTONIX) tablet 40 mg, BID AC      potassium chloride (KLOR-CON M) extended release tablet 20 mEq, Daily with breakfast      simethicone (MYLICON) chewable tablet 80 mg, Q6H PRN      sodium chloride flush 0.9 % injection 10 mL, 2 times per day      sodium chloride flush 0.9 % injection 10 mL, PRN      promethazine (PHENERGAN) tablet 12.5 mg, Q6H PRN    Or      ondansetron (ZOFRAN) injection 4 mg, Q6H PRN      polyethylene glycol (GLYCOLAX) packet 17 g, Daily PRN      acetaminophen (TYLENOL) tablet 650 mg, Q6H PRN    Or      acetaminophen (TYLENOL) suppository 650 mg, Q6H PRN        Data:     Code Status:  Full Code    Family History   Problem Relation Age of Onset    Arthritis Mother     Arthritis Father     High Blood Pressure Father        Social History     Socioeconomic History    Marital status:      Spouse name: Not on file    Number of children: Not on file    Years of education: Not on file    Highest education level: Not on file   Occupational History    Not on file   Social Needs    Financial resource strain: Not on file    Food insecurity     Worry: Not on file     Inability: Not on file    Transportation needs     Medical: Not on file     Non-medical: Not on file   Tobacco Use    Smoking status: Never Smoker    Smokeless tobacco: Never Used   Substance and Sexual Activity    Alcohol use: No     Alcohol/week: 0.0 standard drinks    Drug use: No    Sexual activity: Not on file   Lifestyle    Physical activity     Days per week: 0 days     Minutes per session: 0 min    Stress: Only a little   Relationships    Social connections     Talks on phone: More than three times a week     Gets together: More than three times a week     Attends Islam service: 1 to 4 times per year     Active member of club or organization: No     Attends meetings of clubs or organizations: Never     Relationship status:      Intimate partner violence     Fear of current or ex partner: No     Emotionally abused: No     Physically abused: No     Forced sexual activity: No   Other Topics Concern    Not on file   Social History Narrative    Lives With: Alone, son lives down the street, dtr is in the area    Has a son in the area    Type of Home: [address] in [place] Home Layout: One level    Home Access: Stairs to enter with rails- Number of Steps: 2- Rails: Both    Bathroom Shower/Tub: Tub/Shower unit, Bathroom Equipment: Grab bars in shower, Shower chair    Home Equipment: Rolling walker, Cane(Pt infrequemtly uses DME for ambulation and prefers to furniture walk in home)    ADL Assistance: Independent, [address]: [place] [address] Responsibilities: Yes    Ambulation Assistance: Independent, Transfer Assistance: Independent    Additional Comments: Son stops over 2 times daily           Vitals:  BP 87/61   Pulse 68   Temp 97.4 °F (36.3 °C) (Oral)   Resp 18   Ht 5' 3\" (1.6 m)   Wt 163 lb 12.8 oz (74.3 kg)   SpO2 95%   BMI 29.02 kg/m²   Temp (24hrs), Av.6 °F (36.4 °C), Min:97.3 °F (36.3 °C), Max:98.2 °F (36.8 °C)    No results for input(s): POCGLU in the last 72 hours. I/O (24Hr):     Intake/Output Summary (Last 24 hours) at 2021 1528  Last data filed at 2021 0136  Gross per 24 hour   Intake --   Output 1650 ml   Net -1650 ml       Labs:  Hematology:  Recent Labs     21  0614   WBC 10.0   HGB 11.0*   HCT 33.2*        Chemistry:  Recent Labs     21  0614      K 3.7      CO2 32*   GLUCOSE 112*   BUN 17   CREATININE 0.76   MG 1.8   ANIONGAP 10   LABGLOM >60.0   GFRAA >60.0   CALCIUM 8.8       Urine Culture   Lab Results   Component Value Date    LABURIN 50,000 CFU/ml 2021         Physical Examination:        Physical Exam Zeng catheter draining clear urine  Patient does not understand English very well but seems to have no complaints at this time    Assessment:        Acute urinary retention  History of intermittent urinary retention  Active Hospital Problems    Diagnosis Date Noted    Paroxysmal atrial fibrillation ([facility] [I48.0]      Priority: High    Impaired mobility and activities of daily living due to CHF [facility] admit  [Z74.09, Z78.9]      Priority: High    Lumbar stenosis with neurogenic claudication [M48.062] 06/02/2016     Priority: Medium    Acute on chronic combined systolic (congestive) and diastolic (congestive) heart failure (HCC) [I50.43] 02/09/2021    Hypoxia [R09.02]     Osteoarthritis of lumbar spine with myelopathy [M47.16] 06/02/2016         Plan:        1.  Recommend Zeng catheter to be continued while back in rehab unit      Electronically signed by Michele Alarcon MD on 2/11/2021 at 3:28 PM

## 2021-02-11 NOTE — PROGRESS NOTES
Subjective: The patient complains of severe acute on chronic progressive fatigue and pain on exertion shortness of breath partially relieved by rest, PT, OT and meds and exacerbated by exertion and recent illness. I am concerned about patients medical complexities. She is being diuresed for acute on chronic congestive heart failure  Today patient reports shortness of breath slightly better-he is being followed closely by medical and as well as cardiology and pulmonology. I am concerned about her low BP. Reviewed recent nursing note, \"  Pt sleeping. I woke her up at this time to do assessment and give her her night time meds. She is resting in bed with no complaints at this time. \".    ROS x10: The patient also complains of severely impaired mobility and activities of daily living. Otherwise no new problems with vision, hearing, nose, mouth, throat, dermal, cardiovascular, GI, , pulmonary, musculoskeletal, psychiatric or neurological. See Rehab consult on Rehab chart . Vital signs:  BP 87/61   Pulse 68   Temp 97.4 °F (36.3 °C)   Resp 18   Ht 5' 3\" (1.6 m)   Wt 163 lb 12.8 oz (74.3 kg)   SpO2 95%   BMI 29.02 kg/m²   I/O:   PO/Intake:    fair PO intake,       Bowel/Bladder:   continent,    General:  Patient is well developed, adequately nourished, non-obese and     well kempt. HEENT:    PERRLA, hearing intact to loud voice, external inspection of ear     and nose benign. Inspection of lips, tongue and gums benign  Musculoskeletal: No significant change in strength or tone. All joints stable. Inspection and palpation of digits and nails show no clubbing,       cyanosis or inflammatory conditions. Neuro/Psychiatric: Affect: flat-  Alert and oriented to self and     Situation with  min cues. No significant change in deep tendon reflexes or     sensation  Lungs:  Diminished, CTA-B. Respiration effort is normal at rest.  MOLINA  Heart:   S1 = S2,    irreg .   No loud murmurs. Abdomen:  Soft, non-tender, no enlargement of liver or spleen. Extremities:  No significant lower extremity edema or tenderness. Skin:    BUE bruises dt blood draws, no visualized or palpated problems. Rehabilitation:  Physical therapy: FIMS:  Bed Mobility:      Transfers:  ,  ,      FIMS:  ,  ,      Occupational therapy: FIMS:   ,  ,      Speech therapy: FIMS:        Lab/X-ray studies reviewed, analyzed and discussed with patient and staff:   Recent Results (from the past 24 hour(s))   COVID-19    Collection Time: 02/10/21 11:32 AM   Result Value Ref Range    SARS-CoV-2, NAAT Not Detected Not Detected   EKG 12 Lead    Collection Time: 02/11/21 12:45 AM   Result Value Ref Range    Ventricular Rate 71 BPM    Atrial Rate 71 BPM    P-R Interval 218 ms    QRS Duration 144 ms    Q-T Interval 470 ms    QTc Calculation (Bazett) 510 ms    P Axis 64 degrees    R Axis -62 degrees    T Axis -61 degrees   Magnesium    Collection Time: 02/11/21  6:14 AM   Result Value Ref Range    Magnesium 1.8 1.7 - 2.4 mg/dL   Basic Metabolic Panel    Collection Time: 02/11/21  6:14 AM   Result Value Ref Range    Sodium 142 135 - 144 mEq/L    Potassium 3.7 3.4 - 4.9 mEq/L    Chloride 100 95 - 107 mEq/L    CO2 32 (H) 20 - 31 mEq/L    Anion Gap 10 9 - 15 mEq/L    Glucose 112 (H) 70 - 99 mg/dL    BUN 17 8 - 23 mg/dL    CREATININE 0.76 0.50 - 0.90 mg/dL    GFR Non-African American >60.0 >60    GFR  >60.0 >60    Calcium 8.8 8.5 - 9.9 mg/dL   CBC    Collection Time: 02/11/21  6:14 AM   Result Value Ref Range    WBC 10.0 4.8 - 10.8 K/uL    RBC 3.81 (L) 4.20 - 5.40 M/uL    Hemoglobin 11.0 (L) 12.0 - 16.0 g/dL    Hematocrit 33.2 (L) 37.0 - 47.0 %    MCV 87.1 82.0 - 100.0 fL    MCH 28.8 27.0 - 31.3 pg    MCHC 33.1 33.0 - 37.0 %    RDW 14.2 11.5 - 14.5 %    Platelets 342 860 - 750 K/uL       Previous extensive, complex labs, notes and diagnostics reviewed and analyzed.      ALLERGIES:    Allergies as of 02/09/2021 - Review Complete 02/09/2021   Allergen Reaction Noted    Latex  07/19/2017    Tape [adhesive tape]  06/28/2016      (please also verify by checking [facility])     Complex Physical Medicine & Rehab Issues Assess & Plan:   1. Severe abnormality of gait and mobility and impaired self-care and ADL's secondary to progressive toxic encephalopathy due to recent CHF and COPD exacerbation. Functional and medical status reassessed regarding patients ability to participate in therapies and patient found to be able to participate in  acute intensive comprehensive inpatient rehabilitation program including PT/OT to improve balance, ambulation, ADLs, and to improve the P/AROM. It is my opinion that they will be able to tolerate 3 hours of therapy a day and benefit from it at an acute level. 2. Bowel constipation and Bladder dysfunction overactive bladder:  frequent toileting, ambulate to bathroom with assistance, check post void residuals. Check for C.difficile x1 if >2 loose stools in 24 hours, continue bowel & bladder program.  Monitor for UTI symptoms including lethargy and confusion  3. Severe mid and low back pain and generalized OA pain: reassess pain every shift and prior to and after each therapy session, give prn  Tylenol, modalities prn in therapy, consider Lidoderm, K-pad prn.   4. Skin breakdown risk:  continue pressure relief program.  Daily skin exams and reports from nursing. 5. Severe fatigue due to immobility and nutritional deficits: Add vitamin B12 vitamin D and CoQ10 titrate dosing and add protein supplementation with low carb content. 6. Complex discharge planning:   Await medical stability to consider transfer back to acute rehab versus lower level of care if patient is not able to tolerate rehab. However I think because of her medical problems she will need close monitoring from medical standpoint it would like to try to get her back to rehab if we can.     Complex Active General Medical Issues that complicate care Assess & Plan:     1. Active Problems:    Impaired mobility and activities of daily living due to CHF exac, [facility] admit 2021    Paroxysmal atrial fibrillation (HCC)    Lumbar stenosis with neurogenic claudication    Osteoarthritis of lumbar spine with myelopathy    Hypoxia    Acute on chronic combined systolic (congestive) and diastolic (congestive) heart failure ([address]  Resolved Problems:    * No resolved hospital problems.  Mat Palumbo D.O., PM&R     Attending    [facility]

## 2021-02-11 NOTE — CARE COORDINATION
Per [address], the discharge plan is SNF and not to return to rehab. The LSW met with the patient and her son this morning. The LSW provided a SNF list.  The patient's son, Justin Altman, wanted to talk with his wife regarding the discharge plan. Per the hospitalist, the patient will be here over the weekend.  Electronically signed by JACKLYN Mcclendon on 2/11/21 at 4:25 PM EST

## 2021-02-11 NOTE — PROGRESS NOTES
CC: SOB   Patient is a 80 y.o. female who presents with a chief complaint of SOB. Patient is followed on a regular basis by Dr. Gill Au MD. Patient with past medical history of SVT status post ablation, descending thoracic aorta aneurysm measuring 4.9 cm, ascending aorta aneurysm measuring 4.3 cm, paroxysmal atrial fibrillation,History of provoked DVT, originally presented to [facility] with significant shortness of breath as well as rapid heartbeat and chest pain. Patient was noted to be in acute decompensated combined heart failure. She was started on IV diuretics. She was also developed paroxysmal atrial fibrillation started on oral anticoagulation. She underwent cardiac catheterization for elevated troponins as well as chest pain and a presentation of acute decompensated heart failure and ejection fraction of 40% was noted to have moderate mid LAD stenosis with negative IFR. Patient underwent left pleural effusion thoracentesis by interventional radiology. She was seen by pulmonary as well. She was transferred to rehab and yesterday developed worsening shortness of breath as well as O2 desaturation and transferred to [facility] for further evaluation. Repeat chest x-ray showed CHF findings and given IV Lasix. Normal sinus rhythm at 60 bpm on telemetry. 2/11/2021: Patient complains of dizziness. She is up at the side of the bed eating breakfast.  Denies any chest pain. Zeng remains in place. Renal function electrolytes are within normal limits. Blood pressure is in the 05U systolic.   A. fib on telemetry heart rate in the 90's  -2.2 L total net fluid balance    Past Medical History        Past Medical History:   Diagnosis Date    Ascending aortic aneurysm ([facility] 6/23/2017    Charcot Arleen Tooth muscular atrophy     Depression     Descending aortic aneurysm ([facility] 6/23/2017    Essential hypertension 2/16/2018    Headache     HTN (hypertension)     Impaired mobility and activities of daily living     Lumbar stenosis with neurogenic claudication     Myelopathy ([facility]     Osteoarthritis          Patient Active Problem List   Diagnosis    Lumbar stenosis with neurogenic claudication    Acquired scoliosis    Osteoporosis    Charcot Arleen Tooth muscular atrophy    Excess weight    Osteoarthritis of lumbar spine with myelopathy    Osteoarthritis    Myelopathy ([facility]    Impaired mobility and activities of daily living due to CHF exac, [facility] admit 2021    Headache    Depression    Ascending aortic aneurysm (HCC)    Descending aortic aneurysm (HCC)    Dizziness    Shortness of breath    Essential hypertension    Acute on chronic combined systolic and diastolic CHF (congestive heart failure) (HCC)    Gait abnormality    Paroxysmal atrial fibrillation (HCC)    Pleural effusion    Hypoxia    Acute pulmonary edema (HCC)    Acute on chronic combined systolic (congestive) and diastolic (congestive) heart failure (HCC)     Past Surgical History         Past Surgical History:   Procedure Laterality Date    JOINT REPLACEMENT      THORACENTESIS Left 01/29/2021    total of 755 cc removed per Dr Cherelle Kaufman specimen sent to lab     Social History   Social History         Socioeconomic History    Marital status:       Spouse name: Not on file    Number of children: Not on file    Years of education: Not on file    Highest education level: Not on file   Occupational History    Not on file   Social Needs    Financial resource strain: Not on file    Food insecurity     Worry: Not on file     Inability: Not on file    Transportation needs     Medical: Not on file     Non-medical: Not on file   Tobacco Use    Smoking status: Never Smoker    Smokeless tobacco: Never Used   Substance and Sexual Activity    Alcohol use: No     Alcohol/week: 0.0 standard drinks    Drug use: No    Sexual activity: Not on file   Lifestyle    Physical activity     Days per week: 0 days     Minutes per session: 0 min    Stress: Only a little   Relationships    Social connections     Talks on phone: More than three times a week     Gets together: More than three times a week     Attends Mosque service: 1 to 4 times per year     Active member of club or organization: No     Attends meetings of clubs or organizations: Never     Relationship status:      Intimate partner violence     Fear of current or ex partner: No     Emotionally abused: No     Physically abused: No     Forced sexual activity: No   Other Topics Concern    Not on file   Social History Narrative    Lives With: Alone, son lives down the street, dtr is in the area    Has a son in the area    Type of Home: [address]: One level    Home Access: Stairs to enter with rails- Number of Steps: 2- Rails: Both    Bathroom Shower/Tub: Tub/Shower unit, Bathroom Equipment: Grab bars in shower, Shower chair    Home Equipment: Rolling walker, Cane(Pt infrequemtly uses DME for ambulation and prefers to furniture walk in home)    ADL Assistance: Independent, [address]: [address] [facility] Responsibilities: Yes    Ambulation Assistance: Independent, Transfer Assistance: Independent    Additional Comments: Son stops over 2 times daily         Family History         Family History   Problem Relation Age of Onset    Arthritis Mother     Arthritis Father     High Blood Pressure Father      Current Facility-Administered Medications             Current Facility-Administered Medications   Medication Dose Route Frequency Provider Last Rate Last Admin    citalopram (CELEXA) tablet 20 mg 20 mg Oral Daily Merline Grace, MD      levothyroxine (SYNTHROID) tablet 88 mcg 88 mcg Oral Daily Merline Grace, MD  88 mcg at 02/10/21 9279    aspirin chewable tablet 81 mg 81 mg Oral Daily Merline Grace, MD      vitamin B-12 (CYANOCOBALAMIN) tablet 1,000 mcg 1,000 mcg Oral Daily Merline Grace, MD     [facility] Eyes: Pupils are equal, round, and reactive to light. Neck: Normal range of motion. Neck supple. No JVD present. No tracheal deviation present. No thyromegaly present. Cardiovascular: Normal rate, regular rhythm, normal heart sounds and intact distal pulses. PMI is not displaced. Exam reveals no gallop, no S3, no distant heart sounds and no friction rub. No murmur heard. Pulmonary/Chest: No respiratory distress. She has no wheezes. She has no rales. She exhibits no tenderness. Abdominal: Soft. Bowel sounds are normal. She exhibits no distension and no mass. There is no abdominal tenderness. There is no rebound and no guarding. Musculoskeletal:   General: No edema. Neurological: She is alert and oriented to person, place, and time. No cranial nerve deficit. Skin: Skin is warm and dry. No rash noted. She is not diaphoretic. No erythema. No pallor. Psychiatric: She has a normal mood and affect.  Her behavior is normal. Judgment and thought content normal.     LABS:   Recent Results         Recent Results (from the past 24 hour(s))   COVID-19    Collection Time: 02/09/21 3:33 PM   Result Value Ref Range    SARS-CoV-2, NAAT Not Detected Not Detected   EKG 12 Lead    Collection Time: 02/10/21 12:51 AM   Result Value Ref Range    Ventricular Rate 70 BPM    Atrial Rate 70 BPM    P-R Interval 204 ms    QRS Duration 150 ms    Q-T Interval 466 ms    QTc Calculation (Bazett) 503 ms    P Axis 59 degrees    R Axis -61 degrees    T Axis -59 degrees   Basic Metabolic Panel    Collection Time: 02/10/21 5:47 AM   Result Value Ref Range    Sodium 137 135 - 144 mEq/L    Potassium 4.0 3.4 - 4.9 mEq/L    Chloride 99 95 - 107 mEq/L    CO2 27 20 - 31 mEq/L    Anion Gap 11 9 - 15 mEq/L    Glucose 134 (H) 70 - 99 mg/dL    BUN 15 8 - 23 mg/dL    CREATININE 0.81 0.50 - 0.90 mg/dL    GFR Non-African American >60.0 >60    GFR  >60.0 >60    Calcium 8.5 8.5 - 9.9 mg/dL   Magnesium    Collection Time: 02/10/21 5:47 AM   Result Value Ref Range    Magnesium 1.7 1.7 - 2.4 mg/dL   Lipid panel - fasting    Collection Time: 02/10/21 5:47 AM   Result Value Ref Range    Cholesterol, Total 123 0 - 199 mg/dL    Triglycerides 78 0 - 150 mg/dL    HDL 39 (L) 40 - 59 mg/dL    LDL Calculated 68 0 - 129 mg/dL   Troponin:         Lab Results   Component Value Date    TROPONINI <0.010 01/26/2021     EKG: normal sinus rhythm, nonspecific ST and T waves changes       ASSESSMENT:   Acute on chronic decompensated combined congestive heart failure. NICMP EF of 40%. Paroxysmal atrial fibrillation- afib today. History of coronary disease- Mod LAD disease, negative IFR. History of SVT status post ablation   History of ascending and descending thoracic aortic aneurysm-stable   Essential hypertension   Left pleural effusion status post thoracentesis. PLAN:   1. As always, aggressive risk factor modification is strongly recommended. We should adhere to the JNC VIII guidelines for HTN management and the NCEPATP III guidelines for LDL-C management. 2. Maintain taylor./Urology recommendation  3. Change to PO lasix given low BP/dizziness, monitor I's and O's and renal function. 4. Max cardiac meds-holding ARB and BB due to hypotension. Continue with Cardizem for rate control. 5. Full dose anticoagulation, Xarelto, DC sub Q lovenox. 6. Pulmonary recommendations- follow up CT of chest in near future. 7. CHF teaching and follow up in CHF clinic post discharge  8. Low sodium diet. 9. Urology evaluation/recs regarding urinary retention.    10. Will follow      Electronically signed by Karena Cook DO on 2/11/2021 at 9:20 AM

## 2021-02-12 LAB
ANION GAP SERPL CALCULATED.3IONS-SCNC: 10 MEQ/L (ref 9–15)
BUN BLDV-MCNC: 18 MG/DL (ref 8–23)
CALCIUM SERPL-MCNC: 8.7 MG/DL (ref 8.5–9.9)
CHLORIDE BLD-SCNC: 101 MEQ/L (ref 95–107)
CO2: 29 MEQ/L (ref 20–31)
CREAT SERPL-MCNC: 0.74 MG/DL (ref 0.5–0.9)
GFR AFRICAN AMERICAN: >60
GFR NON-AFRICAN AMERICAN: >60
GLUCOSE BLD-MCNC: 120 MG/DL (ref 70–99)
HCT VFR BLD CALC: 33.7 % (ref 37–47)
HEMOGLOBIN: 11.1 G/DL (ref 12–16)
MAGNESIUM: 1.8 MG/DL (ref 1.7–2.4)
MCH RBC QN AUTO: 28.6 PG (ref 27–31.3)
MCHC RBC AUTO-ENTMCNC: 32.9 % (ref 33–37)
MCV RBC AUTO: 87.2 FL (ref 82–100)
PDW BLD-RTO: 14 % (ref 11.5–14.5)
PLATELET # BLD: 302 K/UL (ref 130–400)
POTASSIUM SERPL-SCNC: 3.7 MEQ/L (ref 3.4–4.9)
PRO-BNP: 5889 PG/ML
RBC # BLD: 3.87 M/UL (ref 4.2–5.4)
SODIUM BLD-SCNC: 140 MEQ/L (ref 135–144)
WBC # BLD: 10 K/UL (ref 4.8–10.8)

## 2021-02-12 PROCEDURE — 80048 BASIC METABOLIC PNL TOTAL CA: CPT

## 2021-02-12 PROCEDURE — 97162 PT EVAL MOD COMPLEX 30 MIN: CPT

## 2021-02-12 PROCEDURE — 6370000000 HC RX 637 (ALT 250 FOR IP): Performed by: FAMILY MEDICINE

## 2021-02-12 PROCEDURE — 6370000000 HC RX 637 (ALT 250 FOR IP): Performed by: INTERNAL MEDICINE

## 2021-02-12 PROCEDURE — 97166 OT EVAL MOD COMPLEX 45 MIN: CPT

## 2021-02-12 PROCEDURE — 36415 COLL VENOUS BLD VENIPUNCTURE: CPT

## 2021-02-12 PROCEDURE — 99232 SBSQ HOSP IP/OBS MODERATE 35: CPT | Performed by: INTERNAL MEDICINE

## 2021-02-12 PROCEDURE — 85027 COMPLETE CBC AUTOMATED: CPT

## 2021-02-12 PROCEDURE — 2060000000 HC ICU INTERMEDIATE R&B

## 2021-02-12 PROCEDURE — APPNB30 APP NON BILLABLE TIME 0-30 MINS: Performed by: PHYSICIAN ASSISTANT

## 2021-02-12 PROCEDURE — 93005 ELECTROCARDIOGRAM TRACING: CPT | Performed by: INTERNAL MEDICINE

## 2021-02-12 PROCEDURE — 2580000003 HC RX 258: Performed by: FAMILY MEDICINE

## 2021-02-12 PROCEDURE — 99223 1ST HOSP IP/OBS HIGH 75: CPT | Performed by: INTERNAL MEDICINE

## 2021-02-12 PROCEDURE — 2700000000 HC OXYGEN THERAPY PER DAY

## 2021-02-12 PROCEDURE — 83880 ASSAY OF NATRIURETIC PEPTIDE: CPT

## 2021-02-12 PROCEDURE — 6360000002 HC RX W HCPCS: Performed by: PHYSICIAN ASSISTANT

## 2021-02-12 PROCEDURE — 94640 AIRWAY INHALATION TREATMENT: CPT

## 2021-02-12 PROCEDURE — 94761 N-INVAS EAR/PLS OXIMETRY MLT: CPT

## 2021-02-12 PROCEDURE — 83735 ASSAY OF MAGNESIUM: CPT

## 2021-02-12 PROCEDURE — 99232 SBSQ HOSP IP/OBS MODERATE 35: CPT | Performed by: PHYSICAL MEDICINE & REHABILITATION

## 2021-02-12 PROCEDURE — 93010 ELECTROCARDIOGRAM REPORT: CPT | Performed by: INTERNAL MEDICINE

## 2021-02-12 RX ORDER — DIGOXIN 0.25 MG/ML
500 INJECTION INTRAMUSCULAR; INTRAVENOUS ONCE
Status: COMPLETED | OUTPATIENT
Start: 2021-02-12 | End: 2021-02-12

## 2021-02-12 RX ADMIN — DIGOXIN 500 MCG: 250 INJECTION, SOLUTION INTRAMUSCULAR; INTRAVENOUS; PARENTERAL at 14:38

## 2021-02-12 RX ADMIN — BUDESONIDE AND FORMOTEROL FUMARATE DIHYDRATE 2 PUFF: 160; 4.5 AEROSOL RESPIRATORY (INHALATION) at 19:43

## 2021-02-12 RX ADMIN — DILTIAZEM HYDROCHLORIDE 120 MG: 120 CAPSULE, COATED, EXTENDED RELEASE ORAL at 21:28

## 2021-02-12 RX ADMIN — CYANOCOBALAMIN TAB 500 MCG 1000 MCG: 500 TAB at 09:16

## 2021-02-12 RX ADMIN — FUROSEMIDE 20 MG: 20 TABLET ORAL at 09:17

## 2021-02-12 RX ADMIN — BUDESONIDE AND FORMOTEROL FUMARATE DIHYDRATE 2 PUFF: 160; 4.5 AEROSOL RESPIRATORY (INHALATION) at 08:08

## 2021-02-12 RX ADMIN — RIVAROXABAN 15 MG: 15 TABLET, FILM COATED ORAL at 16:50

## 2021-02-12 RX ADMIN — PANTOPRAZOLE SODIUM 40 MG: 40 TABLET, DELAYED RELEASE ORAL at 16:50

## 2021-02-12 RX ADMIN — POTASSIUM CHLORIDE 20 MEQ: 20 TABLET, EXTENDED RELEASE ORAL at 09:17

## 2021-02-12 RX ADMIN — DILTIAZEM HYDROCHLORIDE 120 MG: 120 CAPSULE, COATED, EXTENDED RELEASE ORAL at 09:17

## 2021-02-12 RX ADMIN — CITALOPRAM HYDROBROMIDE 20 MG: 20 TABLET ORAL at 09:17

## 2021-02-12 RX ADMIN — PANTOPRAZOLE SODIUM 40 MG: 40 TABLET, DELAYED RELEASE ORAL at 09:17

## 2021-02-12 RX ADMIN — ASPIRIN 81 MG: 81 TABLET, CHEWABLE ORAL at 09:17

## 2021-02-12 RX ADMIN — LEVOTHYROXINE SODIUM 88 MCG: 0.09 TABLET ORAL at 09:17

## 2021-02-12 RX ADMIN — Medication 10 ML: at 21:28

## 2021-02-12 ASSESSMENT — PAIN SCALES - GENERAL
PAINLEVEL_OUTOF10: 0
PAINLEVEL_OUTOF10: 0

## 2021-02-12 NOTE — PROGRESS NOTES
Hospitalist Progress Note      PCP: Ching Peña MD    Date of Admission: 2/9/2021    Subjective: :    Patient complains of left flank pain. She states pain started last night when she was turning in the bed. Urine is pink in color with small amount of blood. She had episodes of SVT on telemetry. Cardiology started her on digoxin for rate control. Medications:  Reviewed    Infusion Medications   Scheduled Medications    furosemide  20 mg Oral Daily    citalopram  20 mg Oral Daily    levothyroxine  88 mcg Oral Daily    aspirin  81 mg Oral Daily    vitamin B-12  1,000 mcg Oral Daily    dilTIAZem  120 mg Oral BID    rivaroxaban  15 mg Oral Daily    budesonide-formoterol  2 puff Inhalation BID    [Held by provider] carvedilol  3.125 mg Oral BID    pantoprazole  40 mg Oral BID AC    potassium chloride  20 mEq Oral Daily with breakfast    sodium chloride flush  10 mL Intravenous 2 times per day     PRN Meds: LORazepam, nitroGLYCERIN, simethicone, sodium chloride flush, promethazine **OR** ondansetron, polyethylene glycol, acetaminophen **OR** acetaminophen      Intake/Output Summary (Last 24 hours) at 2/12/2021 1857  Last data filed at 2/12/2021 1837  Gross per 24 hour   Intake 720 ml   Output 600 ml   Net 120 ml       Exam:    BP 89/65 Comment: Dr. Candelaria Gomes aware   Pulse 75   Temp 97.5 °F (36.4 °C) (Oral)   Resp 18   Ht 5' 3\" (1.6 m)   Wt 161 lb 12.8 oz (73.4 kg)   SpO2 97%   BMI 28.66 kg/m²     General appearance: On high flow oxygen, talking in sentences, no respiratory distress  HEENT:  Conjunctivae/corneas clear. Neck: Supple, with full range of motion. Respiratory:  Normal respiratory effort. Decreased air entry at the left base. Cardiovascular: Regular rate and rhythm with normal S1/S2 without murmurs, rubs or gallops. Abdomen: Soft, mild tenderness in the left flank. Small amount of hematuria in the catheter  Musculoskeletal: No clubbing, cyanosis or edema bilaterally. Skin: Skin color, texture, turgor normal.  No rashes or lesions. Neuro: Alert, moving all extremities      Labs:   Recent Labs     02/11/21  0614 02/12/21  0721   WBC 10.0 10.0   HGB 11.0* 11.1*   HCT 33.2* 33.7*    302     Recent Labs     02/10/21  0547 02/11/21  0614 02/12/21  0721    142 140   K 4.0 3.7 3.7   CL 99 100 101   CO2 27 32* 29   BUN 15 17 18   CREATININE 0.81 0.76 0.74   CALCIUM 8.5 8.8 8.7     No results for input(s): AST, ALT, BILIDIR, BILITOT, ALKPHOS in the last 72 hours. No results for input(s): INR in the last 72 hours. No results for input(s): David Spoon in the last 72 hours. Urinalysis:      Lab Results   Component Value Date    NITRU Negative 02/07/2021    WBCUA 3-5 02/07/2021    BACTERIA Negative 02/07/2021    RBCUA 0-2 02/07/2021    BLOODU TRACE 02/07/2021    SPECGRAV 1.019 02/07/2021    GLUCOSEU Negative 02/07/2021       Radiology:  XR CHEST PORTABLE   Final Result   PULMONARY VASCULAR REMAINS MILDLY CONGESTED. COULD SUGGEST COMPONENT OF UNDERLYING CHF. CORRELATE CLINICALLY   PATCHY TO COALESCENT INFILTRATE WITHIN THE RIGHT LOWER LOBE AS WELL AS AN AREA OF INFILTRATE/CONSOLIDATION LEFT LOWER LOBE WITH TRACE RIGHT PLEURAL EFFUSION AND LEFT PLEURAL EFFUSION. Ulus Reusing             Assessment/Plan:    Active Hospital Problems    Diagnosis Date Noted    Paroxysmal atrial fibrillation [facility]) [I48.0]      Priority: High    Impaired mobility and activities of daily living due to CHF ex, [facility] admit 2021 [Z74.09, Z78.9]      Priority: High    Lumbar stenosis with neurogenic claudication [M48.062] 06/02/2016     Priority: Medium    Acute on chronic combined systolic (congestive) and diastolic (congestive) heart failure (HCC) [I50.43] 02/09/2021    Hypoxia [R09.02]     Osteoarthritis of lumbar spine with myelopathy [M47.16] 06/02/2016     80year-old female with history of systolic heart failure, thoracic aortic aneurysm, paroxysmal atrial fibrillation, pleural effusion, Charcot-Arleen-Tooth disease who is transferred from [address] [facility] with hypoxia    Acute hypoxic respiratory failure  Acute on chronic combined systolic and diastolic heart failure  -Cardiology and pulmonary following  -On Lasix 20 mg p.o.  - Holding ARB and beta-blockers due to hypotension    Bilateral pleural effusion  -Thoracentesis done last admission. Pleural fluid was exudative. Pleural fluid protein was elevated with low LDH (141). -Pleural fluid seems to have reaccumulated on the chest x-ray.  -I discussed with Dr. Alissa Atkins today. Consider repeat thoracentesis on Monday. Need to hold Xarelto if she needs repeat thoracentesis. Paroxysmal atrial fibrillation-she had episodes of A. fib with RVR today. Started on digoxin by cardiology. Continue Cardizem 120 mg twice daily. On anticoagulation with Xarelto. Hypothyroidism-continue levothyroxine    Urinary retention  Hematuria  -There was a small amount of blood in the Zeng catheter. Possibly due to trauma  -Urology is following    Additional work up or/and treatment plan may be added today or then after based on clinical progression. I am managing a portion of pt care. Some medical issues are handled by other specialists. Additional work up and treatment should be done in out pt setting by pt PCP and other out pt providers. In addition to examining and evaluating pt, I spent additional time explaining care, normal and abnormal findings, and treatment plan. All of pt questions were answered. Counseling, diet and education were  provided. Case will be discussed with nursing staff when appropriate. Family will be updated if and when appropriate.       Diet: DIET LOW SODIUM 2 GM;    Code Status: Full Code      Electronically signed by Inna Martin MD on 2/12/2021 at 6:57 PM

## 2021-02-12 NOTE — PROGRESS NOTES
Physical Therapy Med Surg Initial Assessment  Facility/Department: [address] [facility]  Room: [place] [id number]       NAME: Annetta Morrow  : 1932 (80 y.o.)  MRN: [id number]  CODE STATUS: Full Code    Date of Service: 2021    Patient Diagnosis(es): Acute on chronic combined systolic (congestive) and diastolic (congestive) heart failure ([address] [I50.43]   No chief complaint on file.     Patient Active Problem List    Diagnosis Date Noted    Paroxysmal atrial fibrillation [facility])      Priority: High    Impaired mobility and activities of daily living due to CHF [facility] admit       Priority: High    Lumbar stenosis with neurogenic claudication 2016     Priority: Medium    Acute on chronic combined systolic (congestive) and diastolic (congestive) heart failure (HCC) 2021    Pleural effusion 2021    Hypoxia     Acute pulmonary edema (HCC)     Gait abnormality 2021    Acute on chronic combined systolic and diastolic CHF (congestive heart failure) ([address] 2021    Essential hypertension 2018    Shortness of breath     Dizziness     Ascending aortic aneurysm (HCC) 2017    Descending aortic aneurysm ([address] 2017    Osteoarthritis     Myelopathy ([address]     Headache     Depression     Acquired scoliosis 2016    Osteoporosis 2016    Charcot Arleen Tooth muscular atrophy 2016    Excess weight 2016    Osteoarthritis of lumbar spine with myelopathy 2016        Past Medical History:   Diagnosis Date    Ascending aortic aneurysm ([address] 2017    Charcot Arleen Tooth muscular atrophy     Depression     Descending aortic aneurysm ([address] 2017    Essential hypertension 2018    Headache     HTN (hypertension)     Impaired mobility and activities of daily living     Lumbar stenosis with neurogenic claudication     Myelopathy ([address]     Osteoarthritis      Past Surgical History:   Procedure Laterality Date    JOINT REPLACEMENT      THORACENTESIS Left 01/29/2021    total of 755 cc removed per Dr Maribel Krueger specimen sent to lab       Chart Reviewed: Yes  Family / Caregiver Present: No    Restrictions:  Restrictions/Precautions: Fall Risk(Fall risk per Luana Dirk score)     SUBJECTIVE: Subjective: Pt denies pain. Pt breathing \"so so\"  Response To Previous Treatment: Patient with no complaints from previous session. Pain  Pre Treatment Pain Screening  Pain at present: 0  Scale Used: Numeric Score    Post Treatment Pain Screening:   Pain Screening  Patient Currently in Pain: No  Pain Assessment  Pain Level: 0    Prior Level of Function:  Social/Functional History  Lives With: Alone  Type of Home: House  Home Layout: One level  Home Access: Stairs to enter with rails  Entrance Stairs - Number of Steps: 2  Entrance Stairs - Rails: Both  Bathroom Shower/Tub: Tub/Shower unit  Bathroom Equipment: Grab bars in shower, Shower chair  Home Equipment: Rolling walker, Cane(infrequently uses equimpment and furniture walks)  ADL Assistance: [address] [place] [address] Avenue: Independent  Homemaking Responsibilities: Yes  Ambulation Assistance: Independent  Transfer Assistance: Independent  Additional Comments: Son stops over 2 times daily    OBJECTIVE:   Hearing: Exceptions to [facility]  Hearing Exceptions: Hard of hearing/hearing concerns; No hearing aid    Cognition:  Overall Orientation Status: Within Functional Limits  Orientation Level: Oriented to person  Follows Commands: Impaired(pt speaks primarily [place] - no  service for [facility] available. Pt required repetition and demonstration to complete tasks included below)    Observation/Palpation  Posture: Fair  Observation: forward head, rounded shoulders, increased t kyphosis.  Pt on 6 L O2 HFNC    ROM:  RLE PROM: WFL  LLE PROM: WFL    Strength:  Strength RLE  Comment: 4-/5 except 2-/5 df  Strength LLE  Comment: 4-/5 except 2-/5 df    Neuro:  Balance  Posture: Fair  Sitting - Static: Good;-(supervision)  Sitting - Dynamic: Good;-(supervision)  Standing - Static: Fair(SBA with 2ww)  Standing - Dynamic: Fair(SBA with 2ww)        Sensation  Overall Sensation Status: WFL    Bed mobility  Supine to Sit: Supervision  Sit to Supine: Unable to assess(to bedside chair d/t lunch tray delivered)    Transfers  Sit to Stand: Stand by assistance  Stand to sit: Stand by assistance  Bed to Chair: Stand by assistance  Stand Pivot Transfers: Stand by assistance    Ambulation  Ambulation?: Yes  Ambulation 1  Surface: level tile  Device: Rolling Walker  Other Apparatus: O2  Assistance: Stand by assistance  Quality of Gait: steppage gait d/t pt declined orthotic shoes  Gait Deviations: Slow Lea; Increased JAMES  Distance: 4ft forward and 4 ft backwards  Comments: steady, with turning with 2ww    Activity Tolerance  Activity Tolerance: Patient Tolerated treatment well  Activity Tolerance: Lunch tray arrival      PT Education  PT Education: Goals;PT Role;Plan of Care    ASSESSMENT:   Body structures, Functions, Activity limitations: Decreased functional mobility ; Decreased balance;Decreased strength;Decreased posture;Decreased endurance  Decision Making: Medium Complexity  History: med  Exam: med  Clinical Presentation: med    Prognosis: Good  Barriers to Learning: language barrier    DISCHARGE RECOMMENDATIONS:  Discharge Recommendations: Continue to assess pending progress    Assessment: Pt demonstrates the above deficits and decline in functional mobility status placing them at increased risk for falls. Pt would benefit from physical therapy to address above deficits and allow for safe return home at highest level of function, decrease risk for falls, and improve QOL.     REQUIRES PT FOLLOW UP: Yes      PLAN OF CARE:  Plan  Times per week: 3-6  Current Treatment Recommendations: Strengthening, Transfer Training, Endurance Training, Neuromuscular Re-education, Equipment Evaluation, Education, & procurement, Balance Training, Gait Training, Functional Mobility Training, Safety Education & Training, Stair training, Home Exercise Program, Patient/Caregiver Education & Training  Safety Devices  Type of devices: Left in bed, Call light within reach, Bed alarm in place    Goals:  Long term goals  Long term goal 1: Pt will perform bed mobility with indep  Long term goal 2: Pt will perform functional transfers with indep  Long term goal 3: Pt will ambulate >/=50ft with 2ww and mod I  Long term goal 4: Pt will negotiate 4 steps with handrail and SBA    AMPA (6 CLICK) [address] Inpatient Mobility Raw Score : 19     Therapy Time:   Individual   Time In 1119   Time Out 1127   Minutes 8         Nita Hudson, 02/12/21 at 11:41 AM       Definitions for assistance levels  Independent = pt does not require any physical supervision or assistance from another person for activity completion. Device may be needed.   Stand by assistance = pt requires verbal cues or instructions from another person, close to but not touching, to perform the activity  Minimal assistance= pt performs 75% or more of the activity; assistance is required to complete the activity  Moderate assistance= pt performs 50% of the activity; assistance is required to complete the activity  Maximal assistance = pt performs 25% of the activity; assistance is required to complete the activity  Dependent = pt requires total physical assistance to accomplish the task

## 2021-02-12 NOTE — PROGRESS NOTES
Hospitalist Progress Note      PCP: Gabby Farooq MD    Date of Admission: 2/9/2021    Subjective: :    Patient reports dizziness on sitting up. Systolic blood pressure was low today morning. improved to 102/72  Spoke to son at bedside. He is concerned about a urinary retention. Urology has been consulted. Patient reports her shortness of breath is better. Medications:  Reviewed    Infusion Medications   Scheduled Medications    [START ON 2/12/2021] furosemide  20 mg Oral Daily    citalopram  20 mg Oral Daily    levothyroxine  88 mcg Oral Daily    aspirin  81 mg Oral Daily    vitamin B-12  1,000 mcg Oral Daily    dilTIAZem  120 mg Oral BID    rivaroxaban  15 mg Oral Daily    budesonide-formoterol  2 puff Inhalation BID    [Held by provider] carvedilol  3.125 mg Oral BID    pantoprazole  40 mg Oral BID AC    potassium chloride  20 mEq Oral Daily with breakfast    sodium chloride flush  10 mL Intravenous 2 times per day     PRN Meds: LORazepam, nitroGLYCERIN, simethicone, sodium chloride flush, promethazine **OR** ondansetron, polyethylene glycol, acetaminophen **OR** acetaminophen      Intake/Output Summary (Last 24 hours) at 2/11/2021 2018  Last data filed at 2/11/2021 1714  Gross per 24 hour   Intake --   Output 1300 ml   Net -1300 ml       Exam:    /81   Pulse 116   Temp 98.1 °F (36.7 °C) (Oral)   Resp 16   Ht 5' 3\" (1.6 m)   Wt 163 lb 12.8 oz (74.3 kg)   SpO2 95%   BMI 29.02 kg/m²     General appearance: On high flow oxygen, talking in sentences, no respiratory distress  HEENT:  Conjunctivae/corneas clear. Neck: Supple, with full range of motion. Respiratory:  Normal respiratory effort. Decreased air entry at the left base. Cardiovascular: Regular rate and rhythm with normal S1/S2 without murmurs, rubs or gallops. Abdomen: Soft, non-tender, non-distended with normal bowel sounds. Musculoskeletal: No clubbing, cyanosis or edema bilaterally.     Skin: Skin color, texture, turgor normal.  No rashes or lesions. Neuro: Alert, moving all extremities      Labs:   Recent Labs     02/09/21  0547 02/11/21  0614   WBC 9.8 10.0   HGB 10.5* 11.0*   HCT 31.8* 33.2*    289     Recent Labs     02/09/21  0547 02/10/21  0547 02/11/21  0614    137 142   K 3.8 4.0 3.7    99 100   CO2 25 27 32*   BUN 16 15 17   CREATININE 0.68 0.81 0.76   CALCIUM 8.4* 8.5 8.8     No results for input(s): AST, ALT, BILIDIR, BILITOT, ALKPHOS in the last 72 hours. No results for input(s): INR in the last 72 hours. No results for input(s): [place] Ashley in the last 72 hours. Urinalysis:      Lab Results   Component Value Date    NITRU Negative 02/07/2021    WBCUA 3-5 02/07/2021    BACTERIA Negative 02/07/2021    RBCUA 0-2 02/07/2021    BLOODU TRACE 02/07/2021    SPECGRAV 1.019 02/07/2021    GLUCOSEU Negative 02/07/2021       Radiology:  XR CHEST PORTABLE   Final Result   PULMONARY VASCULAR REMAINS MILDLY CONGESTED. COULD SUGGEST COMPONENT OF UNDERLYING CHF. CORRELATE CLINICALLY   PATCHY TO COALESCENT INFILTRATE WITHIN THE RIGHT LOWER LOBE AS WELL AS AN AREA OF INFILTRATE/CONSOLIDATION LEFT LOWER LOBE WITH TRACE RIGHT PLEURAL EFFUSION AND LEFT PLEURAL EFFUSION. Charlette Brown             Assessment/Plan:    Active Hospital Problems    Diagnosis Date Noted    Paroxysmal atrial fibrillation [facility]) [I48.0]      Priority: High    Impaired mobility and activities of daily living due to CHF ex[place], [address] [facility] admit 2021 [Z74.09, Z78.9]      Priority: High    Lumbar stenosis with neurogenic claudication [M48.062] 06/02/2016     Priority: Medium    Acute on chronic combined systolic (congestive) and diastolic (congestive) heart failure (HCC) [I50.43] 02/09/2021    Hypoxia [R09.02]     Osteoarthritis of lumbar spine with myelopathy [M47.16] 06/02/2016     80year-old female with history of systolic heart failure, thoracic aortic aneurysm, paroxysmal atrial fibrillation, pleural effusion, Charcot-Arleen-Tooth disease who is transferred from [facility] with hypoxia    Acute hypoxic respiratory failure  Acute on chronic combined systolic and diastolic heart failure  -Cardiology and pulmonary following  -Patient hypotensive today. Lasix changed to 20 mg p.o. daily. Holding ARB and beta-blockers. Bilateral pleural effusion  -Thoracentesis done last admission. Pleural fluid is exudative  -Pulmonary following    Paroxysmal atrial fibrillation-rate controlled with Cardizem 100 mg twice daily. On anticoagulation with Xarelto. Hypothyroidism-continue levothyroxine    Additional work up or/and treatment plan may be added today or then after based on clinical progression. I am managing a portion of pt care. Some medical issues are handled by other specialists. Additional work up and treatment should be done in out pt setting by pt PCP and other out pt providers. In addition to examining and evaluating pt, I spent additional time explaining care, normal and abnormal findings, and treatment plan. All of pt questions were answered. Counseling, diet and education were  provided. Case will be discussed with nursing staff when appropriate. Family will be updated if and when appropriate.       Diet: DIET LOW SODIUM 2 GM;    Code Status: Full Code      Electronically signed by Jaskaran Lang MD on 2/11/2021 at 8:18 PM

## 2021-02-12 NOTE — PROGRESS NOTES
Progress Note  Patient: Camila Bansal  Unit/Bed: N083/Z009-44  YOB: 1932  MRN: [id number]  Acct: [de-identified]   Admitting Diagnosis: Acute on chronic combined systolic (congestive) and diastolic (congestive) heart failure ([facility] [address] [I50.43]  Date:  2/9/2021  Hospital Day: 3    Chief Complaint:  Impaired mobility/NICMP/Recent PA-fib RVR/nonobstructive CAD    Subjective      2/12/21: Sitting up in chair in no distress. Complaining of not sleeping well last night with shortness of breath. On high flow O2 at 7 L with 99% SPO2. Has some pain under her left breast with radiation into her back. Currently on telemetry she appears to be SVT versus a flutter with heart rate in the 140s. Blood pressure remains marginal and due to previous hypotension her Coreg and losartan were placed on hold. She is currently on Cardizem  twice a day for rate management. Given borderline blood pressure and tachycardia at this time will give IV digoxin. Remains on oral anticoagulation with Xarelto 15 mg p.o. daily. Has Zeng catheter in place with blood noted in Zeng bag but clear yellow urine in tubing. On Lasix 20 mg p.o. daily. Has 2400 mL negative fluid balance since 2/10/2021. Urology following due to urinary retention. 2/11/21: Patient complains of dizziness. She is up at the side of the bed eating breakfast.  Denies any chest pain. Zeng remains in place. Renal function electrolytes are within normal limits. Blood pressure is in the 42R systolic. A. fib on telemetry heart rate in the 90's  -2.2 L total net fluid balance    2/10/21: Patient is a 80 y.o. female who presents with a chief complaint of SOB.  Patient is followed on a regular basis by Dr. Chip Huber MD. Patient with past medical history of SVT status post ablation, descending thoracic aorta aneurysm measuring 4.9 cm, ascending aorta aneurysm measuring 4.3 cm, paroxysmal atrial fibrillation,History of provoked DVT, originally presented to [facility] with significant shortness of breath as well as rapid heartbeat and chest pain. Patient was noted to be in acute decompensated combined heart failure. She was started on IV diuretics. She was also developed paroxysmal atrial fibrillation started on oral anticoagulation. She underwent cardiac catheterization for elevated troponins as well as chest pain and a presentation of acute decompensated heart failure and ejection fraction of 40% was noted to have moderate mid LAD stenosis with negative IFR. Patient underwent left pleural effusion thoracentesis by interventional radiology. She was seen by pulmonary as well. She was transferred to rehab and yesterday developed worsening shortness of breath as well as O2 desaturation and transferred to [facility] for further evaluation. Repeat chest x-ray showed CHF findings and given IV Lasix. Normal sinus rhythm at 60 bpm on telemetry. 2/9/21: Patient developed shortness of breath today and is hypoxic with activity. Unable to participate in rehab. Chest x-ray with vascular congestion/fluid overload/CHF findings. Patient is on IV Lasix 20 mg. Taylor remains in place and noted to have urinary retention by urology    2/8/21: developed SOB overnight and taylor replaced and had significant urine outpust.   BP is marginal.    2/5/21: Resting comfort. Denies any chest pain or shortness of breath. 2/3/21: Patient was transferred to rehab yesterday following hospitalization for acute decompensated systolic heart failure and new nonischemic cardiomyopathy with EF of 40%. She underwent left heart catheterization on 2/1/2021 which showed 60% mid LAD lesion with negative IFR. During her hospitalization she was noted to have new onset paroxysmal A. fib RVR and was initiated on oral anticoagulation with Xarelto.   Also, during her hospitalization, she was noted to have moderate sized left pleural effusion and underwent left thoracentesis on 1/28/2021 with aspiration of 755 mL pleural fluid. Analysis of pleural fluid showed exudative lymphocyte predominant pleural effusion with main concern for malignancy which will need monitored as outpatient with repeat CT chest in 6 to 8 weeks and outpatient follow-up with pulmonology for cytology results. She was optimized on medical therapy and stable for transfer to rehab yesterday. Patient resting comfortably in bed in no acute distress. Complaining of dizziness this morning and has a cool washcloth on her forehead currently. Denies chest pain or shortness of breath. Zeng catheter in place draining dark/turbid urine. A.m. labs reviewed. Renal function and electrolytes stable. Hemoglobin low but stable at 10.3. Review of Systems:   Review of Systems   Constitutional: Negative for activity change. HENT: Negative for congestion. Respiratory: Negative for chest tightness and shortness of breath. Cardiovascular: Negative for chest pain, palpitations and leg swelling. Gastrointestinal: Negative for nausea and vomiting. Genitourinary: Zeng cathter in place   Skin: Negative for color change and pallor. Neurological: Positive for dizziness. Negative for syncope. Psychiatric/Behavioral: Negative for agitation and behavioral problems. Physical Examination:    BP (!) 116/96   Pulse 137   Temp 97.9 °F (36.6 °C)   Resp 16   Ht 5' 3\" (1.6 m)   Wt 161 lb 12.8 oz (73.4 kg)   SpO2 99%   BMI 28.66 kg/m²    Physical Exam  Constitutional:       General: She is not in acute distress. Appearance: Normal appearance. HENT:      Head: Normocephalic and atraumatic. Neck:      Musculoskeletal: Normal range of motion and neck supple. Cardiovascular:      Rate and Rhythm: Tachycardia and regular rhythm. Pulmonary:      Effort: Pulmonary effort is normal. No respiratory distress. Breath sounds: No wheezing, rhonchi or rales.  Decreased breath sounds LLL  Abdominal:      Palpations: Abdomen is soft. Tenderness: There is no abdominal tenderness. Musculoskeletal: Normal range of motion. Right lower leg: No edema. Left lower leg: No edema. Skin:     General: Skin is warm and dry. Neurological:      General: No focal deficit present. Mental Status: She is alert. Cranial Nerves: No cranial nerve deficit.    Psychiatric:         Mood and Affect: Mood normal.         Behavior: Behavior normal.         LABS:  CBC:   Lab Results   Component Value Date    WBC 10.0 02/12/2021    RBC 3.87 02/12/2021    HGB 11.1 02/12/2021    HCT 33.7 02/12/2021    MCV 87.2 02/12/2021    MCH 28.6 02/12/2021    MCHC 32.9 02/12/2021    RDW 14.0 02/12/2021     02/12/2021     CBC with Differential:   Lab Results   Component Value Date    WBC 10.0 02/12/2021    RBC 3.87 02/12/2021    HGB 11.1 02/12/2021    HCT 33.7 02/12/2021     02/12/2021    MCV 87.2 02/12/2021    MCH 28.6 02/12/2021    MCHC 32.9 02/12/2021    RDW 14.0 02/12/2021    LYMPHOPCT 9.0 01/25/2021    MONOPCT 8.7 01/25/2021    BASOPCT 0.4 01/25/2021    MONOSABS 1.0 01/25/2021    LYMPHSABS 1.0 01/25/2021    EOSABS 0.1 01/25/2021    BASOSABS 0.0 01/25/2021     CMP:    Lab Results   Component Value Date     02/12/2021    K 3.7 02/12/2021    K 4.1 01/25/2021     02/12/2021    CO2 29 02/12/2021    BUN 18 02/12/2021    CREATININE 0.74 02/12/2021    GFRAA >60.0 02/12/2021    LABGLOM >60.0 02/12/2021    GLUCOSE 120 02/12/2021    PROT 7.3 01/25/2021    LABALBU 3.5 01/25/2021    CALCIUM 8.7 02/12/2021    BILITOT 0.4 01/25/2021    ALKPHOS 57 01/25/2021    AST 23 01/25/2021    ALT 20 01/25/2021     BMP:    Lab Results   Component Value Date     02/12/2021    K 3.7 02/12/2021    K 4.1 01/25/2021     02/12/2021    CO2 29 02/12/2021    BUN 18 02/12/2021    LABALBU 3.5 01/25/2021    CREATININE 0.74 02/12/2021    CALCIUM 8.7 02/12/2021    GFRAA >60.0 02/12/2021    LABGLOM >60.0 02/12/2021    GLUCOSE 120 02/12/2021 Magnesium:    Lab Results   Component Value Date    MG 1.8 02/12/2021     Troponin:    Lab Results   Component Value Date    TROPONINI <0.010 01/26/2021       Radiology:      CTA Chest 1/25/21:  Impression       No CT evidence of pulmonary embolism.       Moderate left and small right pleural effusions.       Cardiomegaly and small pericardial effusion. Reflux of contrast into the hepatic veins, associated with right heart failure.       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       No lung consolidative opacity. Areas of probable atelectasis/scarring. Echocardiogram 1/27/21:  Conclusions   Summary   Mitral annular calcification is present. 1+ MR   Left ventricular ejection fraction is visually estimated at 40%. E/A flow reversal noted. Suggestive of diastolic dysfunction. Signature   ----------------------------------------------------------------   Electronically signed by Amy Anders MD(Interpreting   physician) on 01/27/2021 11:37 AM   ----------------------------------------------------------------   Findings  Left Ventricle  Left ventricular ejection fraction is visually estimated at 40%. E/A flow reversal noted. Suggestive of diastolic dysfunction. Right Ventricle  Normal right ventricle structure and function. Normal right ventricle systolic pressure. Left Atrium  Moderately dilated left atrium. Right Atrium  Moderately enlarged right atrium size. Mitral Valve  Mitral annular calcification is present. 1+ MR  Tricuspid Valve  Normal tricuspid valve structure and function. 1-2+ TR  RVSP 38 mmHg  Aortic Valve  Aortic valve leaflets are moderately thickened. No AS  No AR  Pulmonic Valve  The pulmonic valve was not well visualized . Pericardial Effusion  No evidence of pericardial effusion. Pleural Effusion  No evidence of pleural effusion.     EKG 1/28/21: SR 77, ST depression with T wave inversion V1-V6, QTc 497ms    Telemetry 2/12/21: -140s        Assessment:    Active Hospital Problems    Diagnosis Date Noted    Paroxysmal atrial fibrillation (HCC) [I48.0]      Priority: High    Impaired mobility and activities of daily living due to CHF [facility] admit 2021 [Z74.09, Z78.9]      Priority: High    Lumbar stenosis with neurogenic claudication [M48.062] 06/02/2016     Priority: Medium    Acute on chronic combined systolic (congestive) and diastolic (congestive) heart failure (HCC) [I50.43] 02/09/2021    Hypoxia [R09.02]     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 Acute respiratory failure   2. Acute systolic CHF--improving  3. New onset NICMP EF 40% per echo 1/27/21  4. Nonobstructive CAD (60% mid LAD with negative IFR) per [facility] 2/1/21  5. Moderate left pleural effusion s/p left thoracentesis with aspiration 755mL pleural fluid on 1/29/21  6. Hx HTN--currently low BP  7. Ascending thoracic aorta measuring around 5.0cm per CTA chest 1/25/21--similar to CTA chest 3/15/19  8. Descending thoracic aortic aneurysm measuring up to 4.8cm per CTA chest 1/25/21--similar to prior CTA chest 3/15/19  9. Anemia--stable  10. Hx of SVT ablation  11. PA-fib RVR--currently SVT vs A-flutter RVR       Plan:  1. Maximize medical therapy-aspirin 81 mg p.o. daily, Cardizem  mg p.o. twice daily, Lasix 20 mg p.o. daily, potassium chloride 20 mEq p.o. daily, oral anticoagulation with Xarelto 20 mg p.o. daily  2. Coreg and losartan on hold due to previous hypotension  3. Will give digoxin 500 mcg IV x1 now given A. fib/flutter RVR with marginal blood pressure  4. Consider IV amiodarone in future if heart rate remains uncontrolled and BP limits further titration of Cardizem. Hesitant to start amiodarone currently given borderline QTc interval/prolonged QTc interval on prior EKGs  5. Cardiac/less than 2 g sodium diet recommended  6. 1800 mL daily fluid restriction  7.  Check daily weight and strict intake and output  8. Monitor on telemetry for any tachycardia or bradycardia arrhythmias  9. Maintain potassium greater than 4, magnesium greater than 2  10. GI/DVT prophylaxis  11. Will need at least annual CT chest for reevaluation of ascending and descending thoracic aortic aneurysms which were stable in size on CTA of the chest from 1/25/2021 compared to prior CT of the chest from 3/15/2019  12. Urology recommendations  13.  Further recommendations to follow    Electronically signed by ASHLEY Huddleston on 2/12/2021 at 12:01 PM

## 2021-02-12 NOTE — PROGRESS NOTES
[facility] OCCUPATIONAL THERAPY EVALUATION - ACUTE     NAME: Donnamaria Kanner  : 1932 (80 y.o.)  MRN: [id number]  CODE STATUS: Full Code  Room: G440/S745-25    Date of Service: 2021    Patient Diagnosis(es): Acute on chronic combined systolic (congestive) and diastolic (congestive) heart failure ([address] [I50.43]   No chief complaint on file.     Patient Active Problem List    Diagnosis Date Noted    Paroxysmal atrial fibrillation [facility])      Priority: High    [facility]       Priority: High    Lumbar stenosis with neurogenic claudication 2016     Priority: Medium    Acute on chronic combined systolic (congestive) and diastolic (congestive) heart failure (HCC) 2021    Pleural effusion 2021    Hypoxia     Acute pulmonary edema (HCC)     Gait abnormality 2021    Acute on chronic combined systolic and diastolic CHF (congestive heart failure) ([address] 2021    Essential hypertension 2018    Shortness of breath     Dizziness     Ascending aortic aneurysm (HCC) 2017    Descending aortic aneurysm ([address] 2017    Osteoarthritis     Myelopathy ([address]     Headache     Depression     Acquired scoliosis 2016    Osteoporosis 2016    Charcot Arleen Tooth muscular atrophy 2016    Excess weight 2016    Osteoarthritis of lumbar spine with myelopathy 2016        Past Medical History:   Diagnosis Date    Ascending aortic aneurysm ([address] 2017    Charcot Arleen Tooth muscular atrophy     Depression     Descending aortic aneurysm ([address] 2017    Essential hypertension 2018    Headache     HTN (hypertension)     Impaired mobility and activities of daily living     Lumbar stenosis with neurogenic claudication     Myelopathy ([address]     Osteoarthritis      Past Surgical History:   Procedure Laterality Date    JOINT REPLACEMENT      THORACENTESIS Left 01/29/2021    total of 755 cc removed per Dr Cherelle Kaufman specimen sent to lab        Restrictions  Restrictions/Precautions: Fall Risk(Fall risk per Pilo Herminia score)     Safety Devices: Safety Devices  Safety Devices in place: Yes  Type of devices: All fall risk precautions in place        Subjective  Pre Treatment Pain Screening  Pain at present: 0  Scale Used: Numeric Score  Intervention List: Patient able to continue with treatment  Comments / Details: Pt states she had pain earlier but it has improved    Pain Reassessment:   Pain Assessment  Patient Currently in Pain: No  Pain Assessment: 0-10  Pain Level: 0       Prior Level of Function:  Social/Functional History  Lives With: Alone  Type of Home: House  Home Layout: One level  Home Access: Stairs to enter with rails  Entrance Stairs - Number of Steps: 2  Entrance Stairs - Rails: Both  Bathroom Shower/Tub: Tub/Shower unit  Bathroom Equipment: Grab bars in shower, Shower chair  Home Equipment: Rolling walker, Cane(infrequently uses equimpment and furniture walks)  ADL Assistance: [address] [place] [address] Avenue: Independent  Homemaking Responsibilities: Yes  Ambulation Assistance: Independent  Transfer Assistance: Independent  Additional Comments: Son stops over 2 times daily    OBJECTIVE:     Orientation Status:  Orientation  Overall Orientation Status: Within Functional Limits    Observation:  Observation/Palpation  Posture: Fair  Observation: forward head, rounded shoulders, increased t kyphosis. Pt on 6 L O2 HFNC    Cognition Status:  Cognition  Overall Cognitive Status: WFL  Arousal/Alertness: Appropriate responses to stimuli  Following Commands:  Follows all commands without difficulty  Attention Span: Appears intact  Memory: (Unable to assess d/t language barrier)  Safety Judgement: Good awareness of safety precautions  Problem Solving: Able to problem solve independently  Insights: Fully aware of deficits  Initiation: Does not require cues  Sequencing: Does not require cues  Cognition Comment: Some limitations d/t language barrier but appears WFL    Perception Status:  Perception  Overall Perceptual Status: WFL    Sensation Status:  Sensation  Overall Sensation Status: WFL    Vision and Hearing Status:  Hearing  Hearing: Exceptions to [facility]  Hearing Exceptions: Hard of hearing/hearing concerns, No hearing aid     ROM:   LUE AROM (degrees)  LUE AROM : WFL  Left Hand AROM (degrees)  Left Hand AROM: WFL  RUE AROM (degrees)  RUE AROM : WFL  Right Hand AROM (degrees)  Right Hand AROM: WFL    Strength:  LUE Strength  Gross LUE Strength: Exceptions to [facility]  L Hand General: 3+/5  LUE Strength Comment: 3+/5 all planes  RUE Strength  Gross RUE Strength: Exceptions to [facility]  R Hand General: 3+/5  RUE Strength Comment: 3+/5 all planes    Coordination, Tone, Quality of Movement: Tone RUE  RUE Tone: Normotonic  Tone LUE  LUE Tone: Normotonic  Coordination  Movements Are Fluid And Coordinated: Yes    Hand Dominance:  Hand Dominance  Hand Dominance: Right    ADL Status:  ADL  Feeding: Independent  Grooming: Setup  UE Bathing: Stand by assistance  LE Bathing: Minimal assistance  UE Dressing: Setup  LE Dressing: Minimal assistance  Toileting: Stand by assistance  Additional Comments: Simulated ADLs in a chair. Reached ankles but did not doff socks, difficulty with full mobility.  Increased time for mobility tasks  Toilet Transfers  Toilet Transfer: Unable to assess  Toilet Transfers Comments: Anticipate SBA       Therapy key for assistance levels -   Independent = Pt. is able to perform task with no assistance but may require a device   Stand by assistance = Pt. does not perform task at an independent level but does not need physical assistance, requires verbal cues  Minimal, Moderate, Maximal Assistance = Pt. requires physical assistance (25%, 50%, 75% assist from helper) for task but is able to actively participate in task   Dependent = Pt. requires total assistance with task and is not able to actively participate with task completion     Functional Mobility:  Functional Mobility  Functional - Mobility Device: Rolling Walker  Activity: Other  Assist Level: Stand by assistance  Functional Mobility Comments: SBA to ambulate from bed to chair  Transfers  Sit to stand: Stand by assistance  Stand to sit: Stand by assistance  Transfer Comments: verbal cues for initiation    Bed Mobility  Bed mobility  Supine to Sit: Supervision  Sit to Supine: Unable to assess(to bedside chair d/t lunch tray delivered)  Scooting: Supervision  Comment: HOB flat, bed rail utilized    Seated and Standing Balance:  Balance  Sitting Balance: Supervision  Standing Balance: Supervision    Functional Endurance:  Activity Tolerance  Activity Tolerance: Patient Tolerated treatment well  Activity Tolerance: 6L O2    D/C Recommendations:  OT D/C RECOMMENDATIONS  REQUIRES OT FOLLOW UP: Yes    Equipment Recommendations:  OT Equipment Recommendations  ADL Assistive Devices: Reacher, Sock-Aid Hard, Long-handled Sponge, Long-handled Shoe Horn    OT Education:   OT Education  OT Education: OT Role, Plan of Care  Patient Education: Educated pt. on role of acute care OT  Barriers to Learning: None    OT Follow Up:  OT D/C RECOMMENDATIONS  REQUIRES OT FOLLOW UP: Yes       Assessment/Discharge Disposition:  Assessment: Pt is an 81 y/o female from home alone who presents to [facility] with the above deficits which impact her independence and safety during ADLs. Pt limited d/t fatigue, weakness and decreased endurance. Pt would benefit from OT  to maximize independence and safety during ADLs.   Performance deficits / Impairments: Decreased functional mobility , Decreased balance, Decreased ADL status, Decreased high-level IADLs, Decreased endurance, Decreased strength, Decreased posture, Decreased safe awareness  Prognosis: Good  Discharge Recommendations: Continue to assess pending progress  Decision Making: Medium

## 2021-02-12 NOTE — CARE COORDINATION
Per Anisha Apodaca the physician is relooking at the patient's information. Await PT/OT. Electronically signed by Lazaro Ritter on 2/12/21 at 12:19 PM EST    The LSW talked to the patient's son, Rosalio Cortes. The patient's son would like 1. [facility] 2. [facility] 3. [facility]. Anisha Apodaca remains following. The LSW faxed the referral to [facility], admissions at [facility] and [facility].  Electronically signed by Lazaro Ritter on 2/12/21 at 12:28 PM EST

## 2021-02-12 NOTE — CONSULTS
Pulmonary Medicine  Consult Note      Reason for consultation:, Left pleural effusion, shortness of breath and chest pain    Consulting physician: :    HISTORY OF PRESENT ILLNESS:      This is a 72-year-old female known to pulmonary service. Patient was in rehab from where patient was transferred to [facility] due to patient having increased shortness of breath and chest pain and for diuresis. Patient is on 7 L O2 via nasal cannula O2 saturation 99%. She is still complaining of chest pain mostly on the left side under the left breast radiates to the back. Telemetry shows atrial flutter versus SVT with heart rate 140s. Blood pressure is on low side. Chest x-ray was done showing changes of congestive heart failure and bibasilar consolidation with pleural effusion she had 2D echo shows LVEF 40%. She had thoracentesis done on 1/29/2021 shows elevated protein 4.5 g per DL. LDH was 141. Lymphocyte predominant pleural effusion. Pleural fluid shows atypical cells consistent with reactive mesothelial cell. Past Medical History:        Diagnosis Date    Ascending aortic aneurysm ([address] 6/23/2017    Charcot Arleen Tooth muscular atrophy     Depression     Descending aortic aneurysm ([address] 6/23/2017    Essential hypertension 2/16/2018    Headache     HTN (hypertension)     Impaired mobility and activities of daily living     Lumbar stenosis with neurogenic claudication     Myelopathy ([address]     Osteoarthritis        Past Surgical History:        Procedure Laterality Date    JOINT REPLACEMENT      THORACENTESIS Left 01/29/2021    total of 755 cc removed per Dr Drea Rees specimen sent to lab       Social History:     reports that she has never smoked. She has never used smokeless tobacco. She reports that she does not drink alcohol or use drugs.     Family History:       Problem Relation Age of Onset    Arthritis Mother     Arthritis Father     High Blood Pressure Father        Allergies:  Latex and Tape Robbi Emerson tape]        MEDICATIONS during current hospitalization:    Continuous Infusions:    Scheduled Meds:   furosemide  20 mg Oral Daily    citalopram  20 mg Oral Daily    levothyroxine  88 mcg Oral Daily    aspirin  81 mg Oral Daily    vitamin B-12  1,000 mcg Oral Daily    dilTIAZem  120 mg Oral BID    rivaroxaban  15 mg Oral Daily    budesonide-formoterol  2 puff Inhalation BID    [Held by provider] carvedilol  3.125 mg Oral BID    pantoprazole  40 mg Oral BID AC    potassium chloride  20 mEq Oral Daily with breakfast    sodium chloride flush  10 mL Intravenous 2 times per day       PRN Meds:LORazepam, nitroGLYCERIN, simethicone, sodium chloride flush, promethazine **OR** ondansetron, polyethylene glycol, acetaminophen **OR** acetaminophen    REVIEW OF SYSTEMS:  As in history of present illness  Other 14 point review of system is negative. PHYSICAL EXAM:    Vitals:  BP 89/65 Comment: Dr. Margarita Mace aware   Pulse 80   Temp 97.5 °F (36.4 °C) (Oral)   Resp 18   Ht 5' 3\" (1.6 m)   Wt 161 lb 12.8 oz (73.4 kg)   SpO2 94%   BMI 28.66 kg/m²   General: Alert, awake, Oriented x3  .comfortable in bed, No distress. Head: Atraumatic , Normocephalic   Eyes: PERRL. No sclera icterus. No conjunctival injection. No discharge   ENT: No nasal  discharge. Pharynx clear. Neck:  Trachea midline. No thyromegaly, no JVD, No cervical adenopathy. Chest : Bilaterally symmetrical ,Normal effort,  No accessory muscle use  Lung : Diminished BS bilateraly. No Rales. No wheezing. No rhonchi. Heart[de-identified] Normal  rate. Regular rhythm. No mumur ,  Rub or gallop  ABD: Non-tender. Non-distended. No masses. No organmegaly. Normal bowel sounds. No hernia. Extremities: No pitting in both lower leg , No Cyanosis ,No clubbing  Neuro: no cranial nerve abnormality, normal reflex and sensation, no focal weakness   Skin: Warm and dry. No erythema rash on exposed extremities.         Data Review  Recent Labs     02/11/21  9523 02/12/21  0721   WBC 10.0 10.0   HGB 11.0* 11.1*   HCT 33.2* 33.7*    302      Recent Labs     02/10/21  0547 02/11/21  0614 02/12/21  0721    142 140   K 4.0 3.7 3.7   CL 99 100 101   CO2 27 32* 29   BUN 15 17 18   CREATININE 0.81 0.76 0.74   GLUCOSE 134* 112* 120*       MV Settings: ABGs: No results for input(s): PHART, GBT7IHP, PO2ART, YXB9KZM, BEART, Z7HNOHOI, HJU0QZL in the last 72 hours. O2 Device: High flow nasal cannula  O2 Flow Rate (L/min): 6 L/min  No results found for: [address]    Radiology  Echo Complete 2d W Doppler W Color    Result Date: 1/27/2021  Transthoracic Echocardiography Report (TTE)  Demographics  Patient Name   Sim Singh        Gender              Female                 Segundo Torres  Patient Number [id number]          Race                                                   Ethnicity  Visit Number   [id number]         Room Number         A696  Corporate ID                     Date of Study       01/27/2021  Accession      [id number]        Referring Physician  Number  Date of Birth  07/12/1932        Sonographer         Nisa Mckenzie [facility]  Age            80 year(s)        Interpreting        [facility])                                   Physician           Cardiology                                                       Amy Anders MD Procedure Type of Study  TTE procedure:ECHO COMPLETE 2D W/DOP W/COLOR. Procedure Date Date: 01/27/2021 Start: 10:26 AM Study Location: Portable Technical Quality: Adequate visualization Indications:Congestive heart failure. Patient Status: Routine Height: 64 inches Weight: 165 pounds BSA: 1.8 m^2 BMI: 28.32 kg/m^2 BP: 108/77 mmHg  Conclusions  Summary  Mitral annular calcification is present. 1+ MR  Left ventricular ejection fraction is visually estimated at 40%. E/A flow reversal noted. Suggestive of diastolic dysfunction.   Signature  ----------------------------------------------------------------  Electronically signed by Amy Anders MD(Interpreting  physician) on 01/27/2021 11:37 AM  ----------------------------------------------------------------  Findings Left Ventricle Left ventricular ejection fraction is visually estimated at 40%. E/A flow reversal noted. Suggestive of diastolic dysfunction. Right Ventricle Normal right ventricle structure and function. Normal right ventricle systolic pressure. Left Atrium Moderately dilated left atrium. Right Atrium Moderately enlarged right atrium size. Mitral Valve Mitral annular calcification is present. 1+ MR Tricuspid Valve Normal tricuspid valve structure and function. 1-2+ TR RVSP 38 mmHg Aortic Valve Aortic valve leaflets are moderately thickened. No AS No AR Pulmonic Valve The pulmonic valve was not well visualized . Pericardial Effusion No evidence of pericardial effusion. Pleural Effusion No evidence of pleural effusion. Aorta \ Miscellaneous The aorta is within normal limits. M-Mode Measurements (cm)  LVIDd: 4.39 cm                         LVIDs: 3.55 cm  IVSd: 1.43 cm                          IVSs: 1.55 cm  LVPWd: 1.5 cm                          LVPWs: 1.48 cm  Rt. Vent.  Dimension: 3.74 cm           AO Root Dimension: 3.62 cm                                         ACS: 1.25 cm                                         LA: 4.54 cm                                         LVOT: 2.04 cm Doppler Measurements:  AV Velocity:0.02 m/s                    MV Peak E-Wave: 0.85 m/s  AV Peak Gradient: 10.44 mmHg            MV Peak A-Wave: 0.87 m/s  AV Mean Gradient: 6.01 mmHg  AV Area (Continuity):1.83 cm^2  TR Velocity:2.62 m/s                    Estimated RAP:10 mmHg  TR Gradient:27.54 mmHg                  RVSP:37.54 mmHg Valves  Mitral Valve  Peak E-Wave: 0.85 m/s                 Peak A-Wave: 0.87 m/s                                        E/A Ratio: 0.97                                        Peak Gradient: 2.88 mmHg                                        Deceleration Time: 232.8 msec  Tissue Doppler E' Septal Velocity: 0.07 m/s  E' Lateral Velocity: 0.08 m/s  Aortic Valve  Peak Velocity: 1.62 m/s                Mean Velocity: 1.15 m/s  Peak Gradient: 10.44 mmHg              Mean Gradient: 6.01 mmHg  Area (continuity): 1.83 cm^2           Area (2D): 1.8 cm^2  AV VTI: 28.8 cm  Cusp Separation: 1.25 cm  Tricuspid Valve  Estimated RVSP: 37.54 mmHg              Estimated RAP: 10 mmHg  TR Velocity: 2.62 m/s                   TR Gradient: 27.54 mmHg  Pulmonic Valve  Peak Velocity: 0.96 m/s           Peak Gradient: 3.66 mmHg                                    Estimated PASP: 37.54 mmHg  LVOT  Peak Velocity: 0.89 m/s              Mean Velocity: 0.57 m/s  Peak Gradient: 3.16 mmHg             Mean Gradient: 1.58 mmHg  LVOT Diameter: 2.04 cm               LVOT VTI: 16.15 cm Structures  Left Atrium  LA Dimension: 4.54 cm                        LA Area: 11.81 cm^2  LA/Aorta: 1.25  LA Volume/Index: 44.64 ml /25 m^2  Left Ventricle  Diastolic Dimension: 4.37 cm         Systolic Dimension: 1.39 cm  Septum Diastolic: 8.09 cm            Septum Systolic: 0.19 cm  PW Diastolic: 1.5 cm                 PW Systolic: 2.08 cm                                       FS: 19.1 %  LV EDV/LV EDV Index: 87.32 ml/49 m^2 LV ESV/LV ESV Index: 52.68 ml/29 m^2  EF Calculated: 39.7 %                LV Length: 6.12 cm  LVOT Diameter: 2.04 cm  Right Atrium  RA Systolic Pressure: 10 mmHg  Right Ventricle  Diastolic Dimension: 5.38 cm                                    RV Systolic Pressure: 39.67 mmHg Aorta/ Miscellaneous Aorta  Aortic Root: 3.62 cm  LVOT Diameter: 2.04 cm    Xr Chest (2 Vw)    1. No evidence of pneumothorax. Resolution of left pleural effusion. Cardiac silhouette remains enlarged. Left lower lobe hazy opacity may represent atelectasis versus pneumonia or infiltrate. COMPARISON: No prior studies available for comparison. DIAGNOSIS:  Post left thoracentesis. Dr. Selene Mandel to read.  COMMENTS: I50.43 Acute on chronic combined systolic and diastolic CHF (congestive heart failure) ([address] ICD10 TECHNIQUE: XR CHEST (2 VW) FINDINGS: Left lower lobe opacity may represent atelectasis versus pneumonia or infiltrate. Interval resolution of left pleural effusion. No evidence of pneumothorax. Cardiac silhouette is enlarged. Visualized soft tissues, and osseous structures are unremarkable. Cta Chest W Wo Contrast    Result Date: 1/26/2021  EXAMINATION:  CHEST CTA WITH CONTRAST (PULMONARY EMBOLISM PROTOCOL) CLINICAL HISTORY:   PE   I50.43 Acute on chronic combined systolic and diastolic CHF (congestive heart failure) ([address] ICD10. Technique:  Spiral axial CT acquisition of the chest from the thoracic inlet to the upper abdomen following IV contrast.  2-D images were reconstructed in the sagittal and coronal planes. 3-D MIPS images were generated in the coronal and axial planes. Images were reviewed on the PACS workstation. All images including the 3-D MIPS were personally archived. Contrast:  IV administration of 100 ml Isovue 300 All CT scans at this facility use dose modulation, iterative reconstruction, and/or weight based dosing when appropriate to reduce radiation dose to as low as reasonably achievable. Comparison:  CTA chest 3/15/2019  RESULT: Limitations: Motion artifact. Evaluation for thromboembolic disease:      - Right heart chambers:  No thromboembolic disease.      - Main pulmonary arteries:  No thromboembolic disease.      - Lobar pulmonary arteries:  No thromboembolic disease.        - Segmental pulmonary arteries:  No thromboembolic disease.        - Subsegmental pulmonary arteries:  Not well visualized due to motion. Lines, tubes, and devices:  None. Lung parenchyma and pleura: Tortuous course of the trachea. Central airways appear grossly patent. Moderate left and small right pleural effusions with associated atelectasis. Limitations in evaluation of the lung parenchyma secondary to motion. Other areas of probable scarring/atelectasis. No pneumothorax. Thoracic inlet, heart, and mediastinum: Visualized thyroid unremarkable. No axillary, mediastinal, or hilar lymphadenopathy. Ascending thoracic aorta aneurysmal, measuring around 5.0 cm, similar to prior. Descending thoracic aorta aneurysmal and measures  up to 4.8 cm, also similar to prior. Normal pulmonary artery size. Cardiomegaly, similar to prior Scattered coronary artery calcifications. Small pericardial effusion. Small hiatal hernia. Bones and soft tissues:  No destructive bone lesion or acute osseous findings. Osteopenia. Scoliosis and kyphosis and multilevel degenerative changes, grossly similar to prior. Chest wall unremarkable. Upper abdomen:  No acute abnormality in the imaged upper abdomen. Reflux of contrast into the hepatic veins, associated with right heart failure. Lower neck: Unremarkable. No CT evidence of pulmonary embolism. Moderate left and small right pleural effusions. Cardiomegaly and small pericardial effusion. Reflux of contrast into the hepatic veins, associated with right heart failure. 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 No lung consolidative opacity. Areas of probable atelectasis/scarring. Ct Abdomen Pelvis W Iv Contrast    Result Date: 2/8/2021  EXAMINATION: CT ABDOMEN PELVIS W IV CONTRAST DATE AND TIME:2/8/2021 10:36 AM CLINICAL HISTORY: Acute abdominal pain. Epigastric pain. epigastric pain  COMPARISON: None available. TECHNIQUE: Contiguous axial CT sections of the abdomen and pelvis. 100 cc's of IV contrast given. .  All CT scans at this facility use dose modulation, iterative reconstruction, and/or weight based dosing when appropriate to reduce radiation dose to as low as reasonably achievable. FINDINGS   Liver: Small hypodensities in the right hepatic lobe the largest measuring 8 mm consistent with small cysts.      Spleen: Negative    Pancreas: Negative Kidney: Negative   Adrenal glands are negative. Bowel: Negative    Nodes: No lymphadenopathy. Aorta: Dilated descending thoracic aorta maximum diameter approximately 4 cm adjacent appearance from the CT scans from January 25, 2021. No infrarenal abdominal aortic aneurysm. Peritoneum: No free fluid or free air. The abdominal wall is intact. Pelvis : 9.4 x 6.5 x 6.6 cm cystic mass in the left adnexa. This appears to be a chronic finding this patient was present on an MRI scan that included this area back in April 2016. Bladder catheter in place with decompressed bladder. Bones: No acute osseous abnormalities. Lung bases: Persistent bibasilar effusions not atelectasis left greater than right. PERSISTENT BIBASILAR PLEURAL-PARENCHYMAL CHANGES. NO ACUTE PATHOLOGY IN THE ABDOMEN OR PELVIS. Xr Chest Portable    Result Date: 2/10/2021  EXAMINATION: CHEST PORTABLE VIEW  CLINICAL HISTORY: Hypoxia COMPARISONS: February 8, 2021 0831 hours  FINDINGS: Single  views of the chest is submitted. The cardiac silhouette is enlarged Pulmonary vascular remains mildly congested. Right sided trachea. Patchy to coalescent infiltrate within the right lower lobe as well as an area of infiltrate/consolidation left lower lobe with trace right pleural effusion and left pleural effusion. .  No Pneumothoraces. PULMONARY VASCULAR REMAINS MILDLY CONGESTED. COULD SUGGEST COMPONENT OF UNDERLYING CHF. CORRELATE CLINICALLY PATCHY TO COALESCENT INFILTRATE WITHIN THE RIGHT LOWER LOBE AS WELL AS AN AREA OF INFILTRATE/CONSOLIDATION LEFT LOWER LOBE WITH TRACE RIGHT PLEURAL EFFUSION AND LEFT PLEURAL EFFUSION. Luiz Fly Chest Portable    Result Date: 2/8/2021  Exam: XR CHEST PORTABLE History: Shortness of breath. Chest pain. Technique: AP portable view of the chest obtained.  Comparison: Portable chest radiograph from February 7, 2021 Findings: Heart size Portable AP ERECT view of the chest. CLINICAL HISTORY:  SOB , Negative Covid-19 test. DATE: 1/25/2021 5:41 PM COMPARISONS: 7/24/2017 FINDINGS: The heart is moderately enlarged, similar to prior exam.  Prominent interstitial markings, consistent with increasing Central vascular congestion. The costophrenic angles are blunted secondary to pleural effusions bilaterally, left greater than  right. No pneumothorax. There are infiltrates/atelectasis within lung bases, left greater than right. There are moderate degenerative changes in spine. CARDIAC ENLARGEMENT WITH CENTRAL VESSEL CONGESTION AND BILATERAL PLEURAL EFFUSIONS, POSSIBLE CONGESTIVE HEART FAILURE. BIBASILAR INFILTRATES/ATELECTASIS, LEFT GREATER THAN RIGHT. Ir Guided Thoracentesis Pleural    Technically successful ultrasound-guided thoracentesis without immediate complications. HISTORY: Elidia Linda is a Female of 80 years age. DIAGNOSIS: Left pleural effusion COMMENTS: I50.43 Acute on chronic combined systolic and diastolic CHF (congestive heart failure) (H[facility]) ICD10 PROCEDURE: Following the discussion of procedure, risks versus benefits, possible complications, informed consent was obtained. Following universal protocol, patient and site verification was performed with a \"timeout\" prior to the procedure. Brief survey of the patient's left hemithorax demonstrate moderate amount of pleural effusion. After selection of an appropriate site for thoracentesis, the thoracic skin was prepped and draped in usual sterile fashion. Under ultrasound guidance, using lidocaine for local anesthesia, a 19-gauge Yueh catheter was inserted in the pleural cavity until effusion was aspirated. Using Vacutainer bottles, 755 mL of clear yellow effusion was drained. The catheter was removed and the aspiration site dressed. The patient tolerated procedure well. No immediate complications were identified.   Subsequent chest radiograph demonstrated no evidence of pneumothorax. The patient left the radiology department in stable condition. Radiology nurse monitored patient's  vital signs throughout the procedure which lasted approximately 30 minutes. Assessment and  plan:     1. Acute hypoxic respiratory failure on 7 L O2.  2. Acute on chronic combined systolic and diastolic heart failure. 3. Bilateral pleural effusion with bibasilar atelectasis. Doubt active pneumonia. 4. Paroxysmal atrial fibrillation  5. Hypothyroidism    She is on 7 L O2 via nasal cannula O2 saturation 99%. Titrate down FiO2 to 5 to 6 L. Chest x-ray shows pulmonary vascular congestion with bilateral small pleural effusion and bibasilar infiltration versus atelectasis. Patient is afebrile no cough and clinically less likely active pneumonia. Patient has exudative lymphocyte predominant. Left-sided pleural effusion which was  drain on 1/29/2020. Pleural fluid size increased and consider repeat thoracentesis. Discussed with Dr. Jessa Segal. Will follow    Thank you for consult    NOTE: This report was transcribed using voice recognition software. Every effort was made to ensure accuracy; however, inadvertent computerized transcription errors may be present.     Electronically signed by Poncho Dorsey MD, FCCP on 2/12/2021 at 5:00 PM

## 2021-02-12 NOTE — FLOWSHEET NOTE
Pts HR was up to 140 after working with PTOT, she is currently up in the chair. Cardiology notified. Electronically signed by Lorna Lennox, RN on 2/12/2021 at 12:15 PM    Pt received one dose IV dig, HR   now in the 80s.  Electronically signed by Lorna Lennox, RN on 2/12/2021 at 2:44 PM

## 2021-02-12 NOTE — DISCHARGE INSTR - COC
Continuity of Care Form    Patient Name: Sanjuana Lockwood   :  1932  MRN:  [id number]    Admit date:  2021  Discharge date:  ***    Code Status Order: Full Code   Advance Directives:     Admitting Physician:  No admitting provider for patient encounter. PCP: Ranulfo Hall MD    Discharging Nurse: [facility] Unit/Room#: E220/J736-52  Discharging Unit Phone Number: ***    Emergency Contact:   Extended Emergency Contact Information  Primary Emergency Contact: Ruddy Beckwith Phone: [phone]  Relation: Child    Past Surgical History:  Past Surgical History:   Procedure Laterality Date    JOINT REPLACEMENT      THORACENTESIS Left 2021    total of 755 cc removed per Dr Bárbara Quiñonez specimen sent to lab       Immunization History: There is no immunization history on file for this patient.     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    Osteoarthritis M19.90    Myelopathy ([address] G95.9    Impaired mobility and activities of daily living due to CHF [facility] admit  Z74.09, Z78.9    Headache R51.9    Depression F32.9    Ascending aortic aneurysm (HCC) I71.2    Descending aortic aneurysm (HCC) I71.9    Dizziness R42    Shortness of breath R06.02    Essential hypertension I10    Acute on chronic combined systolic and diastolic CHF (congestive heart failure) (HCC) I50.43    Gait abnormality R26.9    Paroxysmal atrial fibrillation (HCC) I48.0    Pleural effusion J90    Hypoxia R09.02    Acute pulmonary edema (HCC) J81.0    Acute on chronic combined systolic (congestive) and diastolic (congestive) heart failure (HCC) I50.43       Isolation/Infection:   Isolation          No Isolation        Patient Infection Status     Infection Onset Added Last Indicated Last Indicated By Review Planned Expiration Resolved Resolved By    None active    Resolved    COVID-19 Rule Out 02/10/21 02/10/21 02/10/21 COVID-19 (Ordered)   02/10/21 Rule-Out Test Resulted    COVID-19 Rule Out 21 COVID-19 (Ordered)   21 Rule-Out Test Resulted    COVID-19 Rule Out 21 COVID-19 (Ordered)   21 Rule-Out Test Resulted    COVID-19 Rule Out 21 COVID-19 (Ordered)   21 Rule-Out Test Resulted    COVID-19 Rule Out 21 COVID-19 (Ordered)   21 Rule-Out Test Resulted          Nurse Assessment:  Last Vital Signs: BP (!) 116/96   Pulse 140   Temp 97.9 °F (36.6 °C)   Resp 16   Ht 5' 3\" (1.6 m)   Wt 161 lb 12.8 oz (73.4 kg)   SpO2 97%   BMI 28.66 kg/m²     Last documented pain score (0-10 scale): Pain Level: 0  Last Weight:   Wt Readings from Last 1 Encounters:   21 161 lb 12.8 oz (73.4 kg)     Mental Status:  {IP PT MENTAL STATUS:57400}    IV Access:  { MALISSA IV ACCESS:329297315}    Nursing Mobility/ADLs:  Walking   {CHP DME IXJT:034163901}  Transfer  {CHP DME BKXB:227874801}  Bathing  {CHP DME LZOK:112928553}  Dressing  {CHP DME LEK}  Toileting  {CHP DME XCWT:822640147}  Feeding  {CHP DME HREM:598705264}  Med Admin  {CHP DME KEDM:278389677}  Med Delivery   { MALISSA MED Delivery:338504524}    Wound Care Documentation and Therapy:        Elimination:  Continence:   · Bowel: {YES / UL:35313}  · Bladder: {YES / HF:72242}  Urinary Catheter: {Urinary Catheter:037194543}   Colostomy/Ileostomy/Ileal Conduit: {YES / FK:16207}       Date of Last BM: ***    Intake/Output Summary (Last 24 hours) at 2021 1230  Last data filed at 2021 8945  Gross per 24 hour   Intake 240 ml   Output 500 ml   Net -260 ml     I/O last 3 completed shifts:  In: -   Out: 500 [Urine:500]    Safety Concerns:     [address] Alvarado MALISSA Safety Concerns:288024487}    Impairments/Disabilities:      [address] Impairments/Disabilities:893303198}    Nutrition Therapy:  Current Nutrition Therapy:   [address] Diet List:007459674}    Routes of Feeding: {CHP DME Other Feedings:979814604}  Liquids: {Slp liquid thickness:02074}  Daily Fluid Restriction: {CHP DME Yes amt example:418554714}  Last Modified Barium Swallow with Video (Video Swallowing Test): {Done Not Done WWCU:357508913}    Treatments at the Time of Hospital Discharge:   Respiratory Treatments: ***  Oxygen Therapy:  {Therapy; copd oxygen:91892}  Ventilator:    {[facility] Vent BUEU:732024090}    Rehab Therapies: {THERAPEUTIC INTERVENTION:6396908928}  Weight Bearing Status/Restrictions: {[facility] Weight Bearin}  Other Medical Equipment (for information only, NOT a DME order):  {EQUIPMENT:646959464}  Other Treatments: ***    Patient's personal belongings (please select all that are sent with patient):  {[facility] DME Belongings:368834637}    RN SIGNATURE:  {Esignature:949490582}    CASE MANAGEMENT/SOCIAL WORK SECTION    Inpatient Status Date: ***    Readmission Risk Assessment Score:  Readmission Risk              Risk of Unplanned Readmission:        14           Discharging to Facility/ Agency   · Name:   · Address:  · Phone:  · Fax:    Dialysis Facility (if applicable)   · Name:  · Address:  · Dialysis Schedule:  · Phone:  · Fax:    / signature: Electronically signed by JACKLYN March on 21 at 12:30 PM EST      PHYSICIAN SECTION    Prognosis: {Prognosis:1714360945}    Condition at Discharge: [address] Patient Condition:777058758}    Rehab Potential (if transferring to Rehab): {Prognosis:2335885214}    Recommended Labs or Other Treatments After Discharge: ***    Physician Certification: I certify the above information and transfer of Amanda Mohr  is necessary for the continuing treatment of the diagnosis listed and that she requires {Admit to Appropriate Level of Care:83393} for {GREATER/LESS:878412811} 30 days.      Update Admission H&P: {CHP DME Changes in AEANA:358370891}    PHYSICIAN SIGNATURE:  {Esignature:374518294}

## 2021-02-12 NOTE — PROGRESS NOTES
inflammatory conditions. Neuro/Psychiatric: Affect: flat-  Alert and oriented to self and     Situation with  min cues. No significant change in deep tendon reflexes or     sensation  Lungs:  Diminished, CTA-B. Respiration effort is normal at rest.  MOLINA  Heart:   S1 = S2,    irreg . No loud murmurs. Abdomen:  Soft, non-tender, no enlargement of liver or spleen. Extremities:  No significant lower extremity edema or tenderness. Skin:    BUE bruises dt blood draws, no visualized or palpated problems.     Rehabilitation:  Physical therapy: FIMS:  Bed Mobility:      Transfers:  ,  ,      FIMS:  ,  ,      Occupational therapy: FIMS:   ,  ,      Speech therapy: FIMS:        Lab/X-ray studies reviewed, analyzed and discussed with patient and staff:   Recent Results (from the past 24 hour(s))   EKG 12 Lead    Collection Time: 02/12/21  1:12 AM   Result Value Ref Range    Ventricular Rate 93 BPM    Atrial Rate 113 BPM    QRS Duration 132 ms    Q-T Interval 390 ms    QTc Calculation (Bazett) 484 ms    R Axis -66 degrees    T Axis -57 degrees   Magnesium    Collection Time: 02/12/21  7:21 AM   Result Value Ref Range    Magnesium 1.8 1.7 - 2.4 mg/dL   Brain Natriuretic Peptide    Collection Time: 02/12/21  7:21 AM   Result Value Ref Range    Pro-BNP 5,889 pg/mL   Basic Metabolic Panel    Collection Time: 02/12/21  7:21 AM   Result Value Ref Range    Sodium 140 135 - 144 mEq/L    Potassium 3.7 3.4 - 4.9 mEq/L    Chloride 101 95 - 107 mEq/L    CO2 29 20 - 31 mEq/L    Anion Gap 10 9 - 15 mEq/L    Glucose 120 (H) 70 - 99 mg/dL    BUN 18 8 - 23 mg/dL    CREATININE 0.74 0.50 - 0.90 mg/dL    GFR Non-African American >60.0 >60    GFR  >60.0 >60    Calcium 8.7 8.5 - 9.9 mg/dL   CBC    Collection Time: 02/12/21  7:21 AM   Result Value Ref Range    WBC 10.0 4.8 - 10.8 K/uL    RBC 3.87 (L) 4.20 - 5.40 M/uL    Hemoglobin 11.1 (L) 12.0 - 16.0 g/dL    Hematocrit 33.7 (L) 37.0 - 47.0 %    MCV 87.2 82.0 - 100.0 fL    MCH 28.6 27.0 - 31.3 pg    MCHC 32.9 (L) 33.0 - 37.0 %    RDW 14.0 11.5 - 14.5 %    Platelets 583 691 - 920 K/uL       Previous extensive, complex labs, notes and diagnostics reviewed and analyzed. ALLERGIES:    Allergies as of 02/09/2021 - Review Complete 02/09/2021   Allergen Reaction Noted    Latex  07/19/2017    Tape [adhesive tape]  06/28/2016      (please also verify by checking [facility])     Complex Physical Medicine & Rehab Issues Assess & Plan:   1. Severe abnormality of gait and mobility and impaired self-care and ADL's secondary to progressive toxic encephalopathy due to recent CHF and COPD exacerbation. Functional and medical status reassessed regarding patients ability to participate in therapies and patient found to be able to participate in  acute intensive comprehensive inpatient rehabilitation program including PT/OT to improve balance, ambulation, ADLs, and to improve the P/AROM. It is my opinion that they will be able to tolerate 3 hours of therapy a day and benefit from it at an acute level. 2. Bowel constipation and Bladder dysfunction overactive bladder:  frequent toileting, ambulate to bathroom with assistance, check post void residuals. Check for C.difficile x1 if >2 loose stools in 24 hours, continue bowel & bladder program.  Monitor for UTI symptoms including lethargy and confusion  3. Severe mid and low back pain and generalized OA pain: reassess pain every shift and prior to and after each therapy session, give prn  Tylenol, modalities prn in therapy, consider Lidoderm, K-pad prn.   4. Skin breakdown risk:  continue pressure relief program.  Daily skin exams and reports from nursing. 5. Severe fatigue due to immobility and nutritional deficits: Add vitamin B12 vitamin D and CoQ10 titrate dosing and add protein supplementation with low carb content.   6. Complex discharge planning:   Await medical stability to consider transfer back to acute rehab versus lower level of care if patient

## 2021-02-13 ENCOUNTER — APPOINTMENT (OUTPATIENT)
Dept: GENERAL RADIOLOGY | Age: 86
DRG: 291 | End: 2021-02-13
Attending: FAMILY MEDICINE
Payer: MEDICARE

## 2021-02-13 LAB
ALBUMIN SERPL-MCNC: 3 G/DL (ref 3.5–4.6)
ALP BLD-CCNC: 49 U/L (ref 40–130)
ALT SERPL-CCNC: 21 U/L (ref 0–33)
ANION GAP SERPL CALCULATED.3IONS-SCNC: 12 MEQ/L (ref 9–15)
AST SERPL-CCNC: 20 U/L (ref 0–35)
BILIRUB SERPL-MCNC: 0.5 MG/DL (ref 0.2–0.7)
BUN BLDV-MCNC: 19 MG/DL (ref 8–23)
CALCIUM SERPL-MCNC: 8.9 MG/DL (ref 8.5–9.9)
CHLORIDE BLD-SCNC: 101 MEQ/L (ref 95–107)
CO2: 26 MEQ/L (ref 20–31)
CREAT SERPL-MCNC: 0.94 MG/DL (ref 0.5–0.9)
GFR AFRICAN AMERICAN: >60
GFR NON-AFRICAN AMERICAN: 56.1
GLOBULIN: 3.9 G/DL (ref 2.3–3.5)
GLUCOSE BLD-MCNC: 152 MG/DL (ref 70–99)
HCT VFR BLD CALC: 35.9 % (ref 37–47)
HEMOGLOBIN: 11.9 G/DL (ref 12–16)
MAGNESIUM: 1.8 MG/DL (ref 1.7–2.4)
MCH RBC QN AUTO: 29.1 PG (ref 27–31.3)
MCHC RBC AUTO-ENTMCNC: 33.3 % (ref 33–37)
MCV RBC AUTO: 87.5 FL (ref 82–100)
PDW BLD-RTO: 14.4 % (ref 11.5–14.5)
PLATELET # BLD: 377 K/UL (ref 130–400)
POTASSIUM REFLEX MAGNESIUM: 4.1 MEQ/L (ref 3.4–4.9)
RBC # BLD: 4.1 M/UL (ref 4.2–5.4)
SODIUM BLD-SCNC: 139 MEQ/L (ref 135–144)
TOTAL PROTEIN: 6.9 G/DL (ref 6.3–8)
TROPONIN: <0.01 NG/ML (ref 0–0.01)
WBC # BLD: 13.7 K/UL (ref 4.8–10.8)

## 2021-02-13 PROCEDURE — 99291 CRITICAL CARE FIRST HOUR: CPT | Performed by: INTERNAL MEDICINE

## 2021-02-13 PROCEDURE — 84484 ASSAY OF TROPONIN QUANT: CPT

## 2021-02-13 PROCEDURE — 6360000002 HC RX W HCPCS

## 2021-02-13 PROCEDURE — 94761 N-INVAS EAR/PLS OXIMETRY MLT: CPT

## 2021-02-13 PROCEDURE — 2500000003 HC RX 250 WO HCPCS: Performed by: INTERNAL MEDICINE

## 2021-02-13 PROCEDURE — 85027 COMPLETE CBC AUTOMATED: CPT

## 2021-02-13 PROCEDURE — 2700000000 HC OXYGEN THERAPY PER DAY

## 2021-02-13 PROCEDURE — 94660 CPAP INITIATION&MGMT: CPT

## 2021-02-13 PROCEDURE — 6370000000 HC RX 637 (ALT 250 FOR IP): Performed by: FAMILY MEDICINE

## 2021-02-13 PROCEDURE — 94760 N-INVAS EAR/PLS OXIMETRY 1: CPT

## 2021-02-13 PROCEDURE — 6370000000 HC RX 637 (ALT 250 FOR IP): Performed by: INTERNAL MEDICINE

## 2021-02-13 PROCEDURE — 71045 X-RAY EXAM CHEST 1 VIEW: CPT

## 2021-02-13 PROCEDURE — 2580000003 HC RX 258: Performed by: INTERNAL MEDICINE

## 2021-02-13 PROCEDURE — 80053 COMPREHEN METABOLIC PANEL: CPT

## 2021-02-13 PROCEDURE — 2580000003 HC RX 258: Performed by: FAMILY MEDICINE

## 2021-02-13 PROCEDURE — 83735 ASSAY OF MAGNESIUM: CPT

## 2021-02-13 PROCEDURE — 36415 COLL VENOUS BLD VENIPUNCTURE: CPT

## 2021-02-13 PROCEDURE — 6360000002 HC RX W HCPCS: Performed by: INTERNAL MEDICINE

## 2021-02-13 PROCEDURE — 99232 SBSQ HOSP IP/OBS MODERATE 35: CPT | Performed by: INTERNAL MEDICINE

## 2021-02-13 PROCEDURE — 99231 SBSQ HOSP IP/OBS SF/LOW 25: CPT | Performed by: PHYSICAL MEDICINE & REHABILITATION

## 2021-02-13 PROCEDURE — 6360000002 HC RX W HCPCS: Performed by: FAMILY MEDICINE

## 2021-02-13 PROCEDURE — 2000000000 HC ICU R&B

## 2021-02-13 PROCEDURE — 94640 AIRWAY INHALATION TREATMENT: CPT

## 2021-02-13 PROCEDURE — 93005 ELECTROCARDIOGRAM TRACING: CPT | Performed by: INTERNAL MEDICINE

## 2021-02-13 RX ORDER — FUROSEMIDE 10 MG/ML
INJECTION INTRAMUSCULAR; INTRAVENOUS
Status: COMPLETED
Start: 2021-02-13 | End: 2021-02-13

## 2021-02-13 RX ORDER — DOPAMINE HYDROCHLORIDE 160 MG/100ML
5 INJECTION, SOLUTION INTRAVENOUS CONTINUOUS
Status: DISCONTINUED | OUTPATIENT
Start: 2021-02-13 | End: 2021-02-16

## 2021-02-13 RX ORDER — FUROSEMIDE 10 MG/ML
20 INJECTION INTRAMUSCULAR; INTRAVENOUS ONCE
Status: COMPLETED | OUTPATIENT
Start: 2021-02-13 | End: 2021-02-13

## 2021-02-13 RX ORDER — FUROSEMIDE 10 MG/ML
20 INJECTION INTRAMUSCULAR; INTRAVENOUS ONCE
Status: DISCONTINUED | OUTPATIENT
Start: 2021-02-13 | End: 2021-02-13

## 2021-02-13 RX ADMIN — RIVAROXABAN 15 MG: 15 TABLET, FILM COATED ORAL at 17:34

## 2021-02-13 RX ADMIN — DEXTROSE MONOHYDRATE 2 MCG/MIN: 50 INJECTION, SOLUTION INTRAVENOUS at 04:00

## 2021-02-13 RX ADMIN — FUROSEMIDE 20 MG: 10 INJECTION INTRAMUSCULAR; INTRAVENOUS at 04:15

## 2021-02-13 RX ADMIN — PANTOPRAZOLE SODIUM 40 MG: 40 TABLET, DELAYED RELEASE ORAL at 06:31

## 2021-02-13 RX ADMIN — FUROSEMIDE 20 MG: 10 INJECTION, SOLUTION INTRAMUSCULAR; INTRAVENOUS at 04:15

## 2021-02-13 RX ADMIN — DILTIAZEM HYDROCHLORIDE 120 MG: 120 CAPSULE, COATED, EXTENDED RELEASE ORAL at 10:46

## 2021-02-13 RX ADMIN — POTASSIUM CHLORIDE 20 MEQ: 20 TABLET, EXTENDED RELEASE ORAL at 10:51

## 2021-02-13 RX ADMIN — LEVOTHYROXINE SODIUM 88 MCG: 0.09 TABLET ORAL at 06:31

## 2021-02-13 RX ADMIN — Medication 10 ML: at 20:24

## 2021-02-13 RX ADMIN — BUDESONIDE AND FORMOTEROL FUMARATE DIHYDRATE 2 PUFF: 160; 4.5 AEROSOL RESPIRATORY (INHALATION) at 17:36

## 2021-02-13 RX ADMIN — ASPIRIN 81 MG: 81 TABLET, CHEWABLE ORAL at 10:46

## 2021-02-13 RX ADMIN — FUROSEMIDE 20 MG: 20 TABLET ORAL at 10:46

## 2021-02-13 RX ADMIN — DILTIAZEM HYDROCHLORIDE 120 MG: 120 CAPSULE, COATED, EXTENDED RELEASE ORAL at 20:24

## 2021-02-13 RX ADMIN — ONDANSETRON 4 MG: 2 INJECTION INTRAMUSCULAR; INTRAVENOUS at 03:20

## 2021-02-13 RX ADMIN — ACETAMINOPHEN 650 MG: 325 TABLET ORAL at 03:20

## 2021-02-13 RX ADMIN — DOPAMINE HYDROCHLORIDE 5 MCG/KG/MIN: 160 INJECTION, SOLUTION INTRAVENOUS at 02:47

## 2021-02-13 RX ADMIN — CITALOPRAM HYDROBROMIDE 20 MG: 20 TABLET ORAL at 10:46

## 2021-02-13 RX ADMIN — CYANOCOBALAMIN TAB 500 MCG 1000 MCG: 500 TAB at 10:47

## 2021-02-13 RX ADMIN — BUDESONIDE AND FORMOTEROL FUMARATE DIHYDRATE 2 PUFF: 160; 4.5 AEROSOL RESPIRATORY (INHALATION) at 04:42

## 2021-02-13 RX ADMIN — PANTOPRAZOLE SODIUM 40 MG: 40 TABLET, DELAYED RELEASE ORAL at 17:34

## 2021-02-13 ASSESSMENT — ENCOUNTER SYMPTOMS
NAUSEA: 0
GASTROINTESTINAL NEGATIVE: 1
RESPIRATORY NEGATIVE: 1
STRIDOR: 0
WHEEZING: 0
EYES NEGATIVE: 1
CHEST TIGHTNESS: 0
BLOOD IN STOOL: 0
SHORTNESS OF BREATH: 0
COUGH: 0

## 2021-02-13 ASSESSMENT — PAIN SCALES - PAIN ASSESSMENT IN ADVANCED DEMENTIA (PAINAD)
FACIALEXPRESSION: 1
FACIALEXPRESSION: 1
BODYLANGUAGE: 0
BODYLANGUAGE: 0
FACIALEXPRESSION: 1
NEGVOCALIZATION: 1
CONSOLABILITY: 0
BREATHING: 0
CONSOLABILITY: 0
BODYLANGUAGE: 0
FACIALEXPRESSION: 1
BREATHING: 0
NEGVOCALIZATION: 1
CONSOLABILITY: 0
TOTALSCORE: 2
TOTALSCORE: 2
CONSOLABILITY: 0
CONSOLABILITY: 0
BODYLANGUAGE: 0
BODYLANGUAGE: 0
NEGVOCALIZATION: 1
CONSOLABILITY: 0
NEGVOCALIZATION: 1
FACIALEXPRESSION: 1
BREATHING: 0
BODYLANGUAGE: 0
BREATHING: 0
TOTALSCORE: 2
CONSOLABILITY: 0
TOTALSCORE: 2
BREATHING: 0
NEGVOCALIZATION: 1
BREATHING: 0
NEGVOCALIZATION: 1
NEGVOCALIZATION: 1
CONSOLABILITY: 0

## 2021-02-13 ASSESSMENT — PAIN SCALES - GENERAL
PAINLEVEL_OUTOF10: 0
PAINLEVEL_OUTOF10: 7
PAINLEVEL_OUTOF10: 0

## 2021-02-13 NOTE — PROGRESS NOTES
Subjective: The patient complains of severe acute on chronic progressive fatigue and pain on exertion shortness of breath partially relieved by rest, PT, OT and meds and exacerbated by exertion and recent illness. I am concerned about patients medical complexities. Is recently moved to the ICU and placed on high flow oxygen. Her blood pressure was very low. Seems to be improving- on Lephaphed and high flow O2--may need another Pleuroscentesis. Reviewed recent nursing note, \"  Patient stated she felt dizzy and had a headache. Vitals were 75/46 (53) saturation were 88%. High bubbler was placed on her and than bipap. . Md came to beside and dopamine drip was started. patient had an emesis. Blood pressure remained low. Levo was started per MD and patient was sent to ICU. \".    ROS x10: The patient also complains of severely impaired mobility and activities of daily living. Otherwise no new problems with vision, hearing, nose, mouth, throat, dermal, cardiovascular, GI, , pulmonary, musculoskeletal, psychiatric or neurological. See Rehab consult on Rehab chart . Vital signs:  /67   Pulse 78   Temp 98.1 °F (36.7 °C) (Oral)   Resp 19   Ht 5' 3\" (1.6 m)   Wt 161 lb 12.8 oz (73.4 kg)   SpO2 95%   BMI 28.66 kg/m²   I/O:   PO/Intake:     No problems with PO intake, low-sodium no milk. Bowel/Bladder:   continent,    General:  Patient is well developed, adequately nourished, non-obese and     well kempt. HEENT:    PERRLA, hearing intact to loud voice, external inspection of ear     and nose benign. Inspection of lips, tongue and gums benign  Musculoskeletal: No significant change in strength or tone. All joints stable. Inspection and palpation of digits and nails show no clubbing,       cyanosis or inflammatory conditions. Neuro/Psychiatric: Affect: flat-  Alert and oriented to self and     Situation with  min cues.   No significant change in deep tendon reflexes Anion Gap 12 9 - 15 mEq/L    Glucose 152 (H) 70 - 99 mg/dL    BUN 19 8 - 23 mg/dL    CREATININE 0.94 (H) 0.50 - 0.90 mg/dL    GFR Non-African American 56.1 (L) >60    GFR  >60.0 >60    Calcium 8.9 8.5 - 9.9 mg/dL    Total Protein 6.9 6.3 - 8.0 g/dL    Albumin 3.0 (L) 3.5 - 4.6 g/dL    Total Bilirubin 0.5 0.2 - 0.7 mg/dL    Alkaline Phosphatase 49 40 - 130 U/L    ALT 21 0 - 33 U/L    AST 20 0 - 35 U/L    Globulin 3.9 (H) 2.3 - 3.5 g/dL   CBC    Collection Time: 02/13/21  5:06 AM   Result Value Ref Range    WBC 13.7 (H) 4.8 - 10.8 K/uL    RBC 4.10 (L) 4.20 - 5.40 M/uL    Hemoglobin 11.9 (L) 12.0 - 16.0 g/dL    Hematocrit 35.9 (L) 37.0 - 47.0 %    MCV 87.5 82.0 - 100.0 fL    MCH 29.1 27.0 - 31.3 pg    MCHC 33.3 33.0 - 37.0 %    RDW 14.4 11.5 - 14.5 %    Platelets 199 513 - 307 K/uL   Magnesium    Collection Time: 02/13/21  5:06 AM   Result Value Ref Range    Magnesium 1.8 1.7 - 2.4 mg/dL       Previous extensive, complex labs, notes and diagnostics reviewed and analyzed. ALLERGIES:    Allergies as of 02/09/2021 - Review Complete 02/09/2021   Allergen Reaction Noted    Latex  07/19/2017    Tape [adhesive tape]  06/28/2016      (please also verify by checking [facility])     Complex Physical Medicine & Rehab Issues Assess & Plan:   1. Severe abnormality of gait and mobility and impaired self-care and ADL's secondary to progressive toxic encephalopathy due to recent CHF and COPD exacerbation. Functional and medical status reassessed regarding patients ability to participate in therapies and patient found to be able to participate in  acute intensive comprehensive inpatient rehabilitation program including PT/OT to improve balance, ambulation, ADLs, and to improve the P/AROM. It is my opinion that they will be able to tolerate 3 hours of therapy a day and benefit from it at an acute level.   2. Bowel constipation and Bladder dysfunction overactive bladder:  frequent toileting, ambulate to bathroom with assistance, check post void residuals. Check for C.difficile x1 if >2 loose stools in 24 hours, continue bowel & bladder program.  Monitor for UTI symptoms including lethargy and confusion  3. Severe mid and low back pain and generalized OA pain: reassess pain every shift and prior to and after each therapy session, give prn  Tylenol, modalities prn in therapy, consider Lidoderm, K-pad prn.   4. Skin breakdown risk:  continue pressure relief program.  Daily skin exams and reports from nursing. 5. Severe fatigue due to immobility and nutritional deficits: Add vitamin B12 vitamin D and CoQ10 titrate dosing and add protein supplementation with low carb content. 6. Complex discharge planning:   Await medical stability to consider transfer back to acute rehab versus lower level of care if patient is not able to tolerate rehab. However I think because of her medical problems she will need close monitoring from medical standpoint it would like to try to get her back to rehab if we can. Complex Active General Medical Issues that complicate care Assess & Plan:     1. Active Problems:    Impaired mobility and activities of daily living due to CHF exac, [facility] admit 2021    Paroxysmal atrial fibrillation (HCC)    Lumbar stenosis with neurogenic claudication    Osteoarthritis of lumbar spine with myelopathy    Hypoxia    Acute on chronic combined systolic (congestive) and diastolic (congestive) heart failure ([address]  Resolved Problems:    * No resolved hospital problems.  Neal Jett D.O., PM&R     Attending    [facility] Pamela Cabrera

## 2021-02-13 NOTE — PROGRESS NOTES
Pulmonary ICU Progress Note    PRIMARY SERVICE: Pulmonary Disease    INTERVAL HPI: Patient seen and examined at bedside, Interval Notes, orders reviewed. Nursing notes noted    Patient was transferred to ICU due to hypotension and bradycardia. Patient was initially started with dopamine which improved heart rate but blood pressure continued to less than 65 map started on Levophed. She was placed high flow 15 L nasal cannula. Patient vomited could not tolerate BiPAP. At this time patient heart rate is under control. Levophed drip stopped. Blood pressure is 89/71. O2 saturation 96%. No fever or chills. She continues to have a complaint of shortness of breath and chest discomfort at times. .  No fever. No diarrhea    Review of Systems   as above        Intake/Output Summary (Last 24 hours) at 2/13/2021 1350  Last data filed at 2/13/2021 0636  Gross per 24 hour   Intake 371 ml   Output 900 ml   Net -529 ml       Vitals:  BP 89/71   Pulse 77   Temp 97.3 °F (36.3 °C) (Oral)   Resp 22   Ht 5' 3\" (1.6 m)   Wt 161 lb 12.8 oz (73.4 kg)   SpO2 96%   BMI 28.66 kg/m²   EXAM:  General: On high flow O2 via nasal cannula. Comfortable in bed, No distress. Head: Atraumatic ,Normocephalic   Eyes: PERRL. No sclera icterus. No conjunctival injection. No discharge   ENT: No nasal  discharge. Pharynx clear. Neck:  Trachea midline. No thyromegaly, no JVD, No cervical adenopathy. Resp : Normal effort,  No accessory muscle use. Diminished breath sound at both basilar area more on left side with few left basilar rales. No wheezing. No rhonchi. CV: Normal  rate. Regular rhythm. No mumur ,  Rub or gallop  ABD: Non-tender. Non-distended. No masses. Noorganmegaly. Normal bowel sounds. No hernia.   EXT: No Pitting, No Cyanosis No clubbing    O2 Device: High flow nasal cannula  O2 Flow Rate (L/min): 8 L/min    MV Settings:     FiO2 : 100 %    DIET LOW SODIUM 2 GM;    IV:    DOPamine Stopped (02/13/21 0400)   [facility] norepinephrine Stopped (02/13/21 0800)       Medications:  Scheduled Meds:   furosemide  20 mg Oral Daily    citalopram  20 mg Oral Daily    levothyroxine  88 mcg Oral Daily    aspirin  81 mg Oral Daily    vitamin B-12  1,000 mcg Oral Daily    dilTIAZem  120 mg Oral BID    rivaroxaban  15 mg Oral Daily    budesonide-formoterol  2 puff Inhalation BID    [Held by provider] carvedilol  3.125 mg Oral BID    pantoprazole  40 mg Oral BID AC    potassium chloride  20 mEq Oral Daily with breakfast    sodium chloride flush  10 mL Intravenous 2 times per day       PRN Meds:  LORazepam, nitroGLYCERIN, simethicone, sodium chloride flush, promethazine **OR** ondansetron, polyethylene glycol, acetaminophen **OR** acetaminophen        Radiology      Echo Complete 2d W Doppler W Color    Result Date: 1/27/2021  Transthoracic Echocardiography Report (TTE)  Demographics  Patient Name   Olivia Cook        Gender              Female                 Aby Striclkand  Patient Number [id number]          Race                                                   Ethnicity  Visit Number   [id number]         Room Number         X115  Corporate ID                     Date of Study       01/27/2021  Accession      [id number]        Referring Physician  Number  Date of Birth  07/12/1932        Sonographer         Charlette STRONG  Age            80 year(s)        Interpreting        [facility])                                   Physician           Cardiology                                                       Salma Gonsalves MD Procedure Type of Study  TTE procedure:ECHO COMPLETE 2D W/DOP W/COLOR. Procedure Date Date: 01/27/2021 Start: 10:26 AM Study Location: Portable Technical Quality: Adequate visualization Indications:Congestive heart failure. Patient Status: Routine Height: 64 inches Weight: 165 pounds BSA: 1.8 m^2 BMI: 28.32 kg/m^2 BP: 108/77 mmHg  Conclusions  Summary  Mitral annular calcification is present.   1+ MR  Left ventricular ejection fraction is visually estimated at 40%. E/A flow reversal noted. Suggestive of diastolic dysfunction. Signature  ----------------------------------------------------------------  Electronically signed by Germania Kellogg MD(Interpreting  physician) on 01/27/2021 11:37 AM  ----------------------------------------------------------------  Findings Left Ventricle Left ventricular ejection fraction is visually estimated at 40%. E/A flow reversal noted. Suggestive of diastolic dysfunction. Right Ventricle Normal right ventricle structure and function. Normal right ventricle systolic pressure. Left Atrium Moderately dilated left atrium. Right Atrium Moderately enlarged right atrium size. Mitral Valve Mitral annular calcification is present. 1+ MR Tricuspid Valve Normal tricuspid valve structure and function. 1-2+ TR RVSP 38 mmHg Aortic Valve Aortic valve leaflets are moderately thickened. No AS No AR Pulmonic Valve The pulmonic valve was not well visualized . Pericardial Effusion No evidence of pericardial effusion. Pleural Effusion No evidence of pleural effusion. Aorta \ Miscellaneous The aorta is within normal limits. M-Mode Measurements (cm)  LVIDd: 4.39 cm                         LVIDs: 3.55 cm  IVSd: 1.43 cm                          IVSs: 1.55 cm  LVPWd: 1.5 cm                          LVPWs: 1.48 cm  Rt. Vent.  Dimension: 3.74 cm           AO Root Dimension: 3.62 cm                                         ACS: 1.25 cm                                         LA: 4.54 cm                                         LVOT: 2.04 cm Doppler Measurements:  AV Velocity:0.02 m/s                    MV Peak E-Wave: 0.85 m/s  AV Peak Gradient: 10.44 mmHg            MV Peak A-Wave: 0.87 m/s  AV Mean Gradient: 6.01 mmHg  AV Area (Continuity):1.83 cm^2  TR Velocity:2.62 m/s                    Estimated RAP:10 mmHg  TR Gradient:27.54 mmHg                  RVSP:37.54 mmHg Valves  Mitral Valve  Peak E-Wave: 0.85 m/s Peak A-Wave: 0.87 m/s                                        E/A Ratio: 0.97                                        Peak Gradient: 2.88 mmHg                                        Deceleration Time: 232.8 msec  Tissue Doppler  E' Septal Velocity: 0.07 m/s  E' Lateral Velocity: 0.08 m/s  Aortic Valve  Peak Velocity: 1.62 m/s                Mean Velocity: 1.15 m/s  Peak Gradient: 10.44 mmHg              Mean Gradient: 6.01 mmHg  Area (continuity): 1.83 cm^2           Area (2D): 1.8 cm^2  AV VTI: 28.8 cm  Cusp Separation: 1.25 cm  Tricuspid Valve  Estimated RVSP: 37.54 mmHg              Estimated RAP: 10 mmHg  TR Velocity: 2.62 m/s                   TR Gradient: 27.54 mmHg  Pulmonic Valve  Peak Velocity: 0.96 m/s           Peak Gradient: 3.66 mmHg                                    Estimated PASP: 37.54 mmHg  LVOT  Peak Velocity: 0.89 m/s              Mean Velocity: 0.57 m/s  Peak Gradient: 3.16 mmHg             Mean Gradient: 1.58 mmHg  LVOT Diameter: 2.04 cm               LVOT VTI: 16.15 cm Structures  Left Atrium  LA Dimension: 4.54 cm                        LA Area: 11.81 cm^2  LA/Aorta: 1.25  LA Volume/Index: 44.64 ml /25 m^2  Left Ventricle  Diastolic Dimension: 4.17 cm         Systolic Dimension: 1.56 cm  Septum Diastolic: 9.18 cm            Septum Systolic: 4.48 cm  PW Diastolic: 1.5 cm                 PW Systolic: 9.23 cm                                       FS: 19.1 %  LV EDV/LV EDV Index: 87.32 ml/49 m^2 LV ESV/LV ESV Index: 52.68 ml/29 m^2  EF Calculated: 39.7 %                LV Length: 6.12 cm  LVOT Diameter: 2.04 cm  Right Atrium  RA Systolic Pressure: 10 mmHg  Right Ventricle  Diastolic Dimension: 2.20 cm                                    RV Systolic Pressure: 07.48 mmHg Aorta/ Miscellaneous Aorta  Aortic Root: 3.62 cm  LVOT Diameter: 2.04 cm    Xr Chest (2 Vw)    1. No evidence of pneumothorax. Resolution of left pleural effusion. Cardiac silhouette remains enlarged.  Left lower lobe hazy opacity may represent atelectasis versus pneumonia or infiltrate. COMPARISON: No prior studies available for comparison. DIAGNOSIS:  Post left thoracentesis. Dr. Verónica Ahuja to read. COMMENTS: I50.43 Acute on chronic combined systolic and diastolic CHF (congestive heart failure) (HCC) ICD10 TECHNIQUE: XR CHEST (2 VW) FINDINGS: Left lower lobe opacity may represent atelectasis versus pneumonia or infiltrate. Interval resolution of left pleural effusion. No evidence of pneumothorax. Cardiac silhouette is enlarged. Visualized soft tissues, and osseous structures are unremarkable. Cta Chest W Wo Contrast    Result Date: 1/26/2021  EXAMINATION:  CHEST CTA WITH CONTRAST (PULMONARY EMBOLISM PROTOCOL) CLINICAL HISTORY:   PE   I50.43 Acute on chronic combined systolic and diastolic CHF (congestive heart failure) ([facility] [address] ICD10. Technique:  Spiral axial CT acquisition of the chest from the thoracic inlet to the upper abdomen following IV contrast.  2-D images were reconstructed in the sagittal and coronal planes. 3-D MIPS images were generated in the coronal and axial planes. Images were reviewed on the PACS workstation. All images including the 3-D MIPS were personally archived. Contrast:  IV administration of 100 ml Isovue 300 All CT scans at this facility use dose modulation, iterative reconstruction, and/or weight based dosing when appropriate to reduce radiation dose to as low as reasonably achievable. Comparison:  CTA chest 3/15/2019  RESULT: Limitations: Motion artifact. Evaluation for thromboembolic disease:      - Right heart chambers:  No thromboembolic disease.      - Main pulmonary arteries:  No thromboembolic disease.      - Lobar pulmonary arteries:  No thromboembolic disease.        - Segmental pulmonary arteries:  No thromboembolic disease.        - Subsegmental pulmonary arteries:  Not well visualized due to motion. Lines, tubes, and devices:  None. Lung parenchyma and pleura: Tortuous course of the trachea.  Central airways appear grossly patent. Moderate left and small right pleural effusions with associated atelectasis. Limitations in evaluation of the lung parenchyma secondary to motion. Other areas of probable scarring/atelectasis. No pneumothorax. Thoracic inlet, heart, and mediastinum: Visualized thyroid unremarkable. No axillary, mediastinal, or hilar lymphadenopathy. Ascending thoracic aorta aneurysmal, measuring around 5.0 cm, similar to prior. Descending thoracic aorta aneurysmal and measures  up to 4.8 cm, also similar to prior. Normal pulmonary artery size. Cardiomegaly, similar to prior Scattered coronary artery calcifications. Small pericardial effusion. Small hiatal hernia. Bones and soft tissues:  No destructive bone lesion or acute osseous findings. Osteopenia. Scoliosis and kyphosis and multilevel degenerative changes, grossly similar to prior. Chest wall unremarkable. Upper abdomen:  No acute abnormality in the imaged upper abdomen. Reflux of contrast into the hepatic veins, associated with right heart failure. Lower neck: Unremarkable. No CT evidence of pulmonary embolism. Moderate left and small right pleural effusions. Cardiomegaly and small pericardial effusion. Reflux of contrast into the hepatic veins, associated with right heart failure. 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 No lung consolidative opacity. Areas of probable atelectasis/scarring. Ct Abdomen Pelvis W Iv Contrast    Result Date: 2/8/2021  EXAMINATION: CT ABDOMEN PELVIS W IV CONTRAST DATE AND TIME:2/8/2021 10:36 AM CLINICAL HISTORY: Acute abdominal pain. Epigastric pain. epigastric pain  COMPARISON: None available. TECHNIQUE: Contiguous axial CT sections of the abdomen and pelvis. 100 cc's of IV contrast given. .  All CT scans at this facility use dose modulation, iterative reconstruction, and/or weight based dosing when appropriate to reduce radiation dose to as low as reasonably achievable. FINDINGS   Liver: Small hypodensities in the right hepatic lobe the largest measuring 8 mm consistent with small cysts. Spleen: Negative    Pancreas: Negative     Kidney: Negative   Adrenal glands are negative. Bowel: Negative    Nodes: No lymphadenopathy. Aorta: Dilated descending thoracic aorta maximum diameter approximately 4 cm adjacent appearance from the CT scans from January 25, 2021. No infrarenal abdominal aortic aneurysm. Peritoneum: No free fluid or free air. The abdominal wall is intact. Pelvis : 9.4 x 6.5 x 6.6 cm cystic mass in the left adnexa. This appears to be a chronic finding this patient was present on an MRI scan that included this area back in April 2016. Bladder catheter in place with decompressed bladder. Bones: No acute osseous abnormalities. Lung bases: Persistent bibasilar effusions not atelectasis left greater than right. PERSISTENT BIBASILAR PLEURAL-PARENCHYMAL CHANGES. NO ACUTE PATHOLOGY IN THE ABDOMEN OR PELVIS. Xr Chest Portable    Result Date: 2/13/2021  EXAMINATION: XR CHEST PORTABLE. DATE AND TIME:2/13/2021 3:16 AM CLINICAL HISTORY: Shortness of breath   hypoxia  COMPARISONS: February 10, 2021  FINDINGS: Persistent extensive opacity throughout the left hemithorax consistent with effusion and consolidation. Minimal pleural parenchymal changes at the right base. Overall findings are unchanged     No significant change     Xr Chest Portable    Result Date: 2/10/2021  EXAMINATION: CHEST PORTABLE VIEW  CLINICAL HISTORY: Hypoxia COMPARISONS: February 8, 2021 0831 hours  FINDINGS: Single  views of the chest is submitted. The cardiac silhouette is enlarged Pulmonary vascular remains mildly congested. Right sided trachea. Patchy to coalescent infiltrate within the right lower lobe as well as an area of infiltrate/consolidation left lower lobe with trace right pleural effusion and left pleural effusion. Trude Roers   No Pneumothoraces. PULMONARY VASCULAR REMAINS MILDLY CONGESTED. COULD SUGGEST COMPONENT OF UNDERLYING CHF. CORRELATE CLINICALLY PATCHY TO COALESCENT INFILTRATE WITHIN THE RIGHT LOWER LOBE AS WELL AS AN AREA OF INFILTRATE/CONSOLIDATION LEFT LOWER LOBE WITH TRACE RIGHT PLEURAL EFFUSION AND LEFT PLEURAL EFFUSION. Sailaja Weir Chest Portable    Result Date: 2/8/2021  Exam: XR CHEST PORTABLE History: Shortness of breath. Chest pain. Technique: AP portable view of the chest obtained. Comparison: Portable chest radiograph from February 7, 2021 Findings: Heart size is enlarged. Small bilateral pleural effusions. Bibasilar opacities, left greater than right. No pneumothorax. No acute osseous abnormality. Small bilateral pleural effusions, left greater than right. Bibasilar atelectasis and/or infiltrate, left greater than right. Xr Chest Portable    Result Date: 2/8/2021  EXAMINATION: XR CHEST PORTABLE REASON FOR EXAM: chest pain FINDINGS: Frontal view of the chest reveals cardiomegaly and prominent central pulmonary vasculature consistent with chronic congestion. Findings similar and unchanged from 2/3/2021. Opacification at the left base consistent with residual pneumonitis/atelectasis remains unchanged from previous study 4 days earlier. PNEUMONIA/ATELECTASIS LEFT BASE. CARDIOMEGALY. MILD CHRONIC CENTRAL VASCULAR CONGESTION. NO SIGNIFICANT CHANGE FROM 2/3/2020     Xr Chest Portable    Result Date: 2/4/2021  EXAMINATION: XR CHEST PORTABLE CLINICAL HISTORY: SHORTNESS OF BREATH, CHEST PAIN COMPARISONS: CTA CHEST, JANUARY 25, 2021. CHEST RADIOGRAPHS JANUARY 28, JANUARY 29, 2021. FINDINGS: Osseous structures intact. Cardiopericardial silhouette enlarged and unchanged. Cephalization pulmonary vasculature. No focal airspace disease. Increased opacity left lower lung obscures left diaphragm. STABLE MARKED CARDIOMEGALY.  PROBABLE INTERSTITIAL EDEMA. LEFT LOWER LUNG ATELECTASIS/PNEUMONIA. Xr Chest Portable    Result Date: 1/28/2021  EXAMINATION: CHEST PORTABLE VIEW  CLINICAL HISTORY: Short of breath COMPARISONS: None  FINDINGS: Single  views of the chest is submitted. The cardiac silhouette is enlarged. Pulmonary vascular unremarkable. Right sided trachea. Left lower lobe infiltrate/consolidation, collapse with left sided pleural effusion. LEFT LOWER LOBE INFILTRATE/CONSOLIDATION, COLLAPSE WITH LEFT SIDED PLEURAL EFFUSION    Xr Chest Portable    Result Date: 1/26/2021  EXAMINATION: Portable AP ERECT view of the chest. CLINICAL HISTORY:  SOB , Negative Covid-19 test. DATE: 1/25/2021 5:41 PM COMPARISONS: 7/24/2017 FINDINGS: The heart is moderately enlarged, similar to prior exam.  Prominent interstitial markings, consistent with increasing Central vascular congestion. The costophrenic angles are blunted secondary to pleural effusions bilaterally, left greater than  right. No pneumothorax. There are infiltrates/atelectasis within lung bases, left greater than right. There are moderate degenerative changes in spine. CARDIAC ENLARGEMENT WITH CENTRAL VESSEL CONGESTION AND BILATERAL PLEURAL EFFUSIONS, POSSIBLE CONGESTIVE HEART FAILURE. BIBASILAR INFILTRATES/ATELECTASIS, LEFT GREATER THAN RIGHT. Ir Guided Thoracentesis Pleural    Technically successful ultrasound-guided thoracentesis without immediate complications. HISTORY: Rissa Wren is a Female of 80 years age. DIAGNOSIS: Left pleural effusion COMMENTS: I50.43 Acute on chronic combined systolic and diastolic CHF (congestive heart failure) (HCC) ICD10 PROCEDURE: Following the discussion of procedure, risks versus benefits, possible complications, informed consent was obtained. Following universal protocol, patient and site verification was performed with a \"timeout\" prior to the procedure.  Brief survey of the patient's left hemithorax demonstrate moderate amount of pleural effusion. After selection of an appropriate site for thoracentesis, the thoracic skin was prepped and draped in usual sterile fashion. Under ultrasound guidance, using lidocaine for local anesthesia, a 19-gauge Yueh catheter was inserted in the pleural cavity until effusion was aspirated. Using Vacutainer bottles, 755 mL of clear yellow effusion was drained. The catheter was removed and the aspiration site dressed. The patient tolerated procedure well. No immediate complications were identified. Subsequent chest radiograph demonstrated no evidence of pneumothorax. The patient left the radiology department in stable condition. Radiology nurse monitored patient's  vital signs throughout the procedure which lasted approximately 30 minutes. Results:  CBC:   Recent Labs     02/11/21  0614 02/12/21  0721 02/13/21  0506   WBC 10.0 10.0 13.7*   HGB 11.0* 11.1* 11.9*   HCT 33.2* 33.7* 35.9*   MCV 87.1 87.2 87.5    302 377     :   Recent Labs     02/11/21  0614 02/12/21  0721 02/13/21  0506    140 139   K 3.7 3.7 4.1    101 101   CO2 32* 29 26   BUN 17 18 19   CREATININE 0.76 0.74 0.94*     LIVER PROFILE:   Recent Labs     02/13/21  0506   AST 20   ALT 21   BILITOT 0.5   ALKPHOS 49     Assessment: This is a critically ill patient at risk of deterioration / death , needing close ICU monitoring and intervention due to below noted problems     1. Acute respiratory failure on high flow O2 15 L  2. Paroxysmal atrial fibrillation. 3.  Acute on chronic combined systolic and diastolic heart failure. 4.  Hypotension probably due to hypovolemia  5. Left pleural effusion status post thoracentesis. Exudative lymphocyte predominant    Suggestion:  Patient was on high flow nasal cannula 15 L, titrate down to 12 L and then 8 L. She is maintaining O2 saturation above 90%. Patient was on Levophed drip which is discontinued.   Chest x-ray done showing persistent extensive opacity throughout the left hemithorax consistent with effusion and consolidation. Minimal pleural parenchymal changes at the right base. Overall findings are unchanged. Due to leukocytosis will add empiric antibiotic with Rocephin 1 g every 24 hourly if she develop fever. Cardiology is following. Continue bronchodilator therapy. Discussed with daughter-in-law at bedside also discussed with son who is POA on phone and gave update. Continue present treatment plan. Critical care time spent reviewing labs/films, examining patient, collaborating with otherphysicians but excluding procedures for life threatening organ failure is 36 minutes.       SIGNATURE: Mario Ballesteros MD, [facility]

## 2021-02-13 NOTE — SIGNIFICANT EVENT
Nurse contacted me because patient was hypoxic and in respiratory distress with hypotension and bradycardia. Patient has vascular congestion requiring Lasix. Due to bradycardia and hypotension, patient initially was started on dopamine which improved the heart rate but blood pressure continued to be less than 65 map. Patient saturation was 90% or less on 15 L. BiPAP was tried but patient did not tolerate and started vomiting. She was given 20 mg Lasix which caused some improvement in her saturation and symptoms. However, she continued being 92% on 15 L. Due to hypotension, the decision was to transfer patient to the ICU and started on Levophed. Patient's heart rate was in A. fib with a rate around 100. Dopamine was stopped and patient was started on Levophed. Blood pressure improved on Levophed. Therefore, patient was given additional 20 mg Lasix. Chest x-ray showed clearly vascular congestion and increased pleural effusion. I discussed the patient's situation with her son and her son translated to her. CODE STATUS was verified with the son and he would like her to be full code.       I spent 60 minutes of critical care time take care of the patient    Electronically signed by Jamaal Conner MD on 2/13/2021 at 5:40 AM

## 2021-02-13 NOTE — PROGRESS NOTES
week     Gets together: More than three times a week     Attends [employer] service: 1 to 4 times per year     Active member of club or organization: No     Attends meetings of clubs or organizations: Never     Relationship status:     Intimate partner violence     Fear of current or ex partner: No     Emotionally abused: No     Physically abused: No     Forced sexual activity: No   Other Topics Concern    Not on file   Social History Narrative    Lives With: Alone, son lives down the street, dtr is in the area    Has a son in the area    Type of Home: [facility]  in [address]: One level    Home Access: Stairs to enter with rails- Number of Steps: 2- Rails: Both    Bathroom Shower/Tub: Tub/Shower unit, Bathroom Equipment: Grab bars in shower, Shower chair    Home Equipment: Rolling walker, Cane(Pt infrequemtly uses DME for ambulation and prefers to furniture walk in home)    ADL Assistance: Independent, [facility] Responsibilities: Yes    Ambulation Assistance: Independent, Transfer Assistance: Independent    Additional Comments: Son stops over 2 times daily           Subjective/HPI sitting up in bed eating. On High flow O2 8L. sats 95%. No fever. Off Levo. EKG: AF 74        Review of Systems:   Review of Systems   Constitutional: Negative. Negative for diaphoresis and fatigue. HENT: Negative. Eyes: Negative. Respiratory: Negative. Negative for cough, chest tightness, shortness of breath, wheezing and stridor. Cardiovascular: Negative. Negative for chest pain, palpitations and leg swelling. Gastrointestinal: Negative. Negative for blood in stool and nausea. Genitourinary: Negative. Musculoskeletal: Negative. Skin: Negative. Neurological: Positive for weakness. Negative for dizziness, syncope and light-headedness. Hematological: Negative. Psychiatric/Behavioral: Negative.           Physical Examination:    BP 103/67   Pulse 78   Temp 97.3 °F (36.3 °C) (Oral)   Resp 19   Ht 5' 3\" (1.6 m)   Wt 161 lb 12.8 oz (73.4 kg)   SpO2 95%   BMI 28.66 kg/m²    Physical Exam   Constitutional: She appears healthy. No distress. HENT:   Normal cephalic and Atraumatic   Eyes: Pupils are equal, round, and reactive to light. Neck: Normal range of motion and thyroid normal. Neck supple. No JVD present. No neck adenopathy. No thyromegaly present. Cardiovascular: Normal rate, regular rhythm, intact distal pulses and normal pulses. Murmur heard. Pulmonary/Chest: Effort normal and breath sounds normal. She has no wheezes. She has no rales. She exhibits no tenderness. Abdominal: Soft. Bowel sounds are normal. There is no abdominal tenderness. Musculoskeletal: Normal range of motion. General: No tenderness or edema. Neurological: She is alert and oriented to person, place, and time. Skin: Skin is warm. No cyanosis. Nails show no clubbing.        LABS:  CBC:   Lab Results   Component Value Date    WBC 13.7 02/13/2021    RBC 4.10 02/13/2021    HGB 11.9 02/13/2021    HCT 35.9 02/13/2021    MCV 87.5 02/13/2021    MCH 29.1 02/13/2021    MCHC 33.3 02/13/2021    RDW 14.4 02/13/2021     02/13/2021     CBC with Differential:    Lab Results   Component Value Date    WBC 13.7 02/13/2021    RBC 4.10 02/13/2021    HGB 11.9 02/13/2021    HCT 35.9 02/13/2021     02/13/2021    MCV 87.5 02/13/2021    MCH 29.1 02/13/2021    MCHC 33.3 02/13/2021    RDW 14.4 02/13/2021    LYMPHOPCT 9.0 01/25/2021    MONOPCT 8.7 01/25/2021    BASOPCT 0.4 01/25/2021    MONOSABS 1.0 01/25/2021    LYMPHSABS 1.0 01/25/2021    EOSABS 0.1 01/25/2021    BASOSABS 0.0 01/25/2021     CMP:    Lab Results   Component Value Date     02/13/2021    K 4.1 02/13/2021     02/13/2021    CO2 26 02/13/2021    BUN 19 02/13/2021    CREATININE 0.94 02/13/2021    GFRAA >60.0 02/13/2021    LABGLOM 56.1 02/13/2021    GLUCOSE 152 02/13/2021    PROT 6.9 02/13/2021    LABALBU 3.0 02/13/2021    CALCIUM 8.9 02/13/2021    BILITOT 0.5 02/13/2021    ALKPHOS 49 02/13/2021    AST 20 02/13/2021    ALT 21 02/13/2021     BMP:    Lab Results   Component Value Date     02/13/2021    K 4.1 02/13/2021     02/13/2021    CO2 26 02/13/2021    BUN 19 02/13/2021    LABALBU 3.0 02/13/2021    CREATININE 0.94 02/13/2021    CALCIUM 8.9 02/13/2021    GFRAA >60.0 02/13/2021    LABGLOM 56.1 02/13/2021    GLUCOSE 152 02/13/2021     Magnesium:    Lab Results   Component Value Date    MG 1.8 02/13/2021     Troponin:    Lab Results   Component Value Date    TROPONINI <0.010 02/13/2021        Active Hospital Problems    Diagnosis Date Noted    Paroxysmal atrial fibrillation [facility]) [I48.0]      Priority: High    Impaired mobility and activities of daily living due to CHF ex, [facility] admit 2021 [Z74.09, Z78.9]      Priority: High    Lumbar stenosis with neurogenic claudication [M48.062] 06/02/2016     Priority: Medium    Acute on chronic combined systolic (congestive) and diastolic (congestive) heart failure ([facility] [address] [I50.43] 02/09/2021     Priority: Low    Hypoxia [R09.02]      Priority: Low    Osteoarthritis of lumbar spine with myelopathy [M47.16] 06/02/2016     Priority: Low        Assessment/Plan:  1. Respiratory failure with continue high O2 requirement  2. SHF- responded well. No further edema  3. Hypotension- improving. Off Pressors. 4. NICM EF 40%  5. Mid/Mod CAD - LAD 60%  6. Descending Aortic Dilation 4.8 cm   7. AF- rate controlled- on Xarelto continue rate control.         Electronically signed by Babs Bejarano MD on 2/13/2021 at 1:01 PM

## 2021-02-13 NOTE — PROGRESS NOTES
0430- Received patient from [email or web address] and follows commands. Levophed infusing at 5 mcg. Larisa Sin at 15 liters. 0500 Resting with eyes closed.

## 2021-02-13 NOTE — PROGRESS NOTES
Patient in bed wants to sleep. Assessment done. Understands [place]. LEVO OFF. dR Peña HERE TALKING TO PATIENT AND FAMILY AND REDUCED HIGH FLOW TO 8L.

## 2021-02-13 NOTE — PROGRESS NOTES
Hospitalist Progress Note      PCP: Kristel Durham MD    Date of Admission: 2/9/2021    Chief Complaint: weakness/dyspnea    Subjective: pt awake. Looks comfortable     Medications:  Reviewed    Infusion Medications    DOPamine Stopped (02/13/21 0400)    norepinephrine Stopped (02/13/21 0800)     Scheduled Medications    furosemide  20 mg Oral Daily    citalopram  20 mg Oral Daily    levothyroxine  88 mcg Oral Daily    aspirin  81 mg Oral Daily    vitamin B-12  1,000 mcg Oral Daily    dilTIAZem  120 mg Oral BID    rivaroxaban  15 mg Oral Daily    budesonide-formoterol  2 puff Inhalation BID    [Held by provider] carvedilol  3.125 mg Oral BID    pantoprazole  40 mg Oral BID AC    potassium chloride  20 mEq Oral Daily with breakfast    sodium chloride flush  10 mL Intravenous 2 times per day     PRN Meds: LORazepam, nitroGLYCERIN, simethicone, sodium chloride flush, promethazine **OR** ondansetron, polyethylene glycol, acetaminophen **OR** acetaminophen      Intake/Output Summary (Last 24 hours) at 2/13/2021 1345  Last data filed at 2/13/2021 0636  Gross per 24 hour   Intake 371 ml   Output 900 ml   Net -529 ml       Exam:    BP 89/71   Pulse 77   Temp 97.3 °F (36.3 °C) (Oral)   Resp 22   Ht 5' 3\" (1.6 m)   Wt 161 lb 12.8 oz (73.4 kg)   SpO2 96%   BMI 28.66 kg/m²     General appearance: No apparent distress, appears stated age and cooperative. HEENT: Pupils equal, round, and reactive to light. Conjunctivae/corneas clear. Neck: Supple, with full range of motion. No jugular venous distention. Trachea midline. Respiratory:  Normal respiratory effort. Clear to auscultation, bilaterally without Rales/Wheezes/Rhonchi. Cardiovascular: Regular rate and rhythm with normal S1/S2 without murmurs, rubs or gallops. Abdomen: Soft, non-tender, non-distended with normal bowel sounds. Musculoskeletal: trace edema bilaterally. Skin: Skin color, texture, turgor normal.  No rashes or lesions.   Neurologic: Neurovascularly intact without any focal sensory/motor deficits. Psychiatric: Alert and oriented,   Capillary Refill: Brisk,< 3 seconds   Peripheral Pulses: +2 palpable, equal bilaterally       Labs:   Recent Labs     02/11/21  0614 02/12/21  0721 02/13/21  0506   WBC 10.0 10.0 13.7*   HGB 11.0* 11.1* 11.9*   HCT 33.2* 33.7* 35.9*    302 377     Recent Labs     02/11/21  0614 02/12/21  0721 02/13/21  0506    140 139   K 3.7 3.7 4.1    101 101   CO2 32* 29 26   BUN 17 18 19   CREATININE 0.76 0.74 0.94*   CALCIUM 8.8 8.7 8.9     Recent Labs     02/13/21  0506   AST 20   ALT 21   BILITOT 0.5   ALKPHOS 49     No results for input(s): INR in the last 72 hours. Recent Labs     02/13/21  0506   TROPONINI <0.010       Urinalysis:      Lab Results   Component Value Date    NITRU Negative 02/07/2021    WBCUA 3-5 02/07/2021    BACTERIA Negative 02/07/2021    RBCUA 0-2 02/07/2021    BLOODU TRACE 02/07/2021    SPECGRAV 1.019 02/07/2021    GLUCOSEU Negative 02/07/2021       Radiology:  XR CHEST PORTABLE   Final Result   No significant change                  XR CHEST PORTABLE   Final Result   PULMONARY VASCULAR REMAINS MILDLY CONGESTED. COULD SUGGEST COMPONENT OF UNDERLYING CHF. CORRELATE CLINICALLY   PATCHY TO COALESCENT INFILTRATE WITHIN THE RIGHT LOWER LOBE AS WELL AS AN AREA OF INFILTRATE/CONSOLIDATION LEFT LOWER LOBE WITH TRACE RIGHT PLEURAL EFFUSION AND LEFT PLEURAL EFFUSION. Nohemi Profit                 Assessment/Plan:    Active Hospital Problems    Diagnosis Date Noted    Paroxysmal atrial fibrillation [facility]) [I48.0]      Priority: High    Impaired mobility and activities of daily living due to CHF [facility] admit 2021 [Z74.09, Z78.9]      Priority: High    Lumbar stenosis with neurogenic claudication [M48.062] 06/02/2016     Priority: Medium    Acute on chronic combined systolic (congestive) and diastolic (congestive) heart failure ([address] [I50.43] 02/09/2021    Hypoxia [R09.02]     Osteoarthritis of lumbar spine with myelopathy [M47.16] 06/02/2016         DVT Prophylaxis: xarelto  Diet: DIET LOW SODIUM 2 GM;  Code Status: Full Code    PT/OT Eval Status:     Dispo - Hypotension- improved, off pressors now  CHF- chronic systolic- stable, appreciate cardiology input  Pleural effusion- lasix were given, plan for repeated thoracentesis Monday per pulmonary service  Paroxsymal A fib- rate controlled, full anticoag.  Cardiology on case   Hypothyroidism- synthroid  Urinary retention taylor in place   continue acute management in ICU, will follow along with consultants       Electronically signed by Anali Nazario MD on 2/13/2021 at 1:45 PM

## 2021-02-14 LAB
ANION GAP SERPL CALCULATED.3IONS-SCNC: 11 MEQ/L (ref 9–15)
BUN BLDV-MCNC: 17 MG/DL (ref 8–23)
CALCIUM SERPL-MCNC: 8.8 MG/DL (ref 8.5–9.9)
CHLORIDE BLD-SCNC: 101 MEQ/L (ref 95–107)
CO2: 27 MEQ/L (ref 20–31)
CREAT SERPL-MCNC: 0.89 MG/DL (ref 0.5–0.9)
GFR AFRICAN AMERICAN: >60
GFR NON-AFRICAN AMERICAN: 59.8
GLUCOSE BLD-MCNC: 133 MG/DL (ref 70–99)
HCT VFR BLD CALC: 36.1 % (ref 37–47)
HEMOGLOBIN: 11.7 G/DL (ref 12–16)
MAGNESIUM: 1.7 MG/DL (ref 1.7–2.4)
MCH RBC QN AUTO: 28.5 PG (ref 27–31.3)
MCHC RBC AUTO-ENTMCNC: 32.4 % (ref 33–37)
MCV RBC AUTO: 87.9 FL (ref 82–100)
PDW BLD-RTO: 14.1 % (ref 11.5–14.5)
PLATELET # BLD: 355 K/UL (ref 130–400)
POTASSIUM SERPL-SCNC: 3.7 MEQ/L (ref 3.4–4.9)
RBC # BLD: 4.1 M/UL (ref 4.2–5.4)
SODIUM BLD-SCNC: 139 MEQ/L (ref 135–144)
WBC # BLD: 9.9 K/UL (ref 4.8–10.8)

## 2021-02-14 PROCEDURE — 80048 BASIC METABOLIC PNL TOTAL CA: CPT

## 2021-02-14 PROCEDURE — 83735 ASSAY OF MAGNESIUM: CPT

## 2021-02-14 PROCEDURE — 99291 CRITICAL CARE FIRST HOUR: CPT | Performed by: INTERNAL MEDICINE

## 2021-02-14 PROCEDURE — 85027 COMPLETE CBC AUTOMATED: CPT

## 2021-02-14 PROCEDURE — 2000000000 HC ICU R&B

## 2021-02-14 PROCEDURE — 94761 N-INVAS EAR/PLS OXIMETRY MLT: CPT

## 2021-02-14 PROCEDURE — 94640 AIRWAY INHALATION TREATMENT: CPT

## 2021-02-14 PROCEDURE — 6370000000 HC RX 637 (ALT 250 FOR IP): Performed by: INTERNAL MEDICINE

## 2021-02-14 PROCEDURE — 97166 OT EVAL MOD COMPLEX 45 MIN: CPT

## 2021-02-14 PROCEDURE — 99232 SBSQ HOSP IP/OBS MODERATE 35: CPT | Performed by: INTERNAL MEDICINE

## 2021-02-14 PROCEDURE — 6370000000 HC RX 637 (ALT 250 FOR IP): Performed by: FAMILY MEDICINE

## 2021-02-14 PROCEDURE — 93005 ELECTROCARDIOGRAM TRACING: CPT | Performed by: INTERNAL MEDICINE

## 2021-02-14 PROCEDURE — 2700000000 HC OXYGEN THERAPY PER DAY

## 2021-02-14 PROCEDURE — 97163 PT EVAL HIGH COMPLEX 45 MIN: CPT

## 2021-02-14 PROCEDURE — 2580000003 HC RX 258: Performed by: FAMILY MEDICINE

## 2021-02-14 PROCEDURE — 36415 COLL VENOUS BLD VENIPUNCTURE: CPT

## 2021-02-14 RX ORDER — MIDODRINE HYDROCHLORIDE 5 MG/1
5 TABLET ORAL
Status: DISCONTINUED | OUTPATIENT
Start: 2021-02-14 | End: 2021-02-19 | Stop reason: HOSPADM

## 2021-02-14 RX ADMIN — BUDESONIDE AND FORMOTEROL FUMARATE DIHYDRATE 2 PUFF: 160; 4.5 AEROSOL RESPIRATORY (INHALATION) at 05:02

## 2021-02-14 RX ADMIN — FUROSEMIDE 20 MG: 20 TABLET ORAL at 08:21

## 2021-02-14 RX ADMIN — PANTOPRAZOLE SODIUM 40 MG: 40 TABLET, DELAYED RELEASE ORAL at 17:00

## 2021-02-14 RX ADMIN — CYANOCOBALAMIN TAB 500 MCG 1000 MCG: 500 TAB at 08:21

## 2021-02-14 RX ADMIN — BUDESONIDE AND FORMOTEROL FUMARATE DIHYDRATE 2 PUFF: 160; 4.5 AEROSOL RESPIRATORY (INHALATION) at 18:05

## 2021-02-14 RX ADMIN — DILTIAZEM HYDROCHLORIDE 120 MG: 120 CAPSULE, COATED, EXTENDED RELEASE ORAL at 08:20

## 2021-02-14 RX ADMIN — Medication 10 ML: at 21:13

## 2021-02-14 RX ADMIN — ASPIRIN 81 MG: 81 TABLET, CHEWABLE ORAL at 08:20

## 2021-02-14 RX ADMIN — LEVOTHYROXINE SODIUM 88 MCG: 0.09 TABLET ORAL at 08:23

## 2021-02-14 RX ADMIN — RIVAROXABAN 15 MG: 15 TABLET, FILM COATED ORAL at 17:36

## 2021-02-14 RX ADMIN — MIDODRINE HYDROCHLORIDE 5 MG: 5 TABLET ORAL at 17:36

## 2021-02-14 RX ADMIN — MIDODRINE HYDROCHLORIDE 5 MG: 5 TABLET ORAL at 12:08

## 2021-02-14 RX ADMIN — PANTOPRAZOLE SODIUM 40 MG: 40 TABLET, DELAYED RELEASE ORAL at 08:23

## 2021-02-14 RX ADMIN — CARVEDILOL 3.12 MG: 3.12 TABLET, FILM COATED ORAL at 21:13

## 2021-02-14 RX ADMIN — CITALOPRAM HYDROBROMIDE 20 MG: 20 TABLET ORAL at 08:21

## 2021-02-14 RX ADMIN — Medication 10 ML: at 08:21

## 2021-02-14 RX ADMIN — POTASSIUM CHLORIDE 20 MEQ: 20 TABLET, EXTENDED RELEASE ORAL at 08:21

## 2021-02-14 ASSESSMENT — PAIN SCALES - GENERAL
PAINLEVEL_OUTOF10: 0

## 2021-02-14 ASSESSMENT — ENCOUNTER SYMPTOMS
SHORTNESS OF BREATH: 0
CHEST TIGHTNESS: 0
COUGH: 0
GASTROINTESTINAL NEGATIVE: 1
WHEEZING: 0
EYES NEGATIVE: 1
STRIDOR: 0
NAUSEA: 0
BLOOD IN STOOL: 0
RESPIRATORY NEGATIVE: 1

## 2021-02-14 NOTE — PROGRESS NOTES
2020- patient primarily speaks gree[place]. Knows very little english but is able to communicate effectively with staff. Calm and cooperative, appears to be in a pleasant mood as she was smiling and laughing while RN was in the room. Remains on hiflow 15L, tolerating well. Vss. Levo drip in place. Prefers to have hob flat while sleeping on her left side, almost onto her stomach. Able to swallow pills whole without difficulty and tolerates thin liquids well. Afebrile. Denies pain or discomfort. vss    0050- noted to be sinus adrianna on monitor, hr in 40's but only for a few seconds then quickly recovers. Patient assessed, pulse palpated without difficulty and patient is easily aroused. Does not appear to be in distress or discomfort.  Nods her head and says \"good\"

## 2021-02-14 NOTE — PROGRESS NOTES
Pulmonary ICU Progress Note    PRIMARY SERVICE: Pulmonary Disease    INTERVAL HPI: Patient seen and examined at bedside, Interval Notes, orders reviewed. Nursing notes noted    Patient required to be restarted on Levophed last night but again off this morning. Blood pressure is 88/49. She has complaint of dizziness. She is on high flow O2 with 8 L. O2 saturation 93% chest x-ray shows worsening left pleural effusion. She is not able to tolerate BiPAP. She does have a complaint of shortness of breath and chest discomfort off and on. No hemoptysis. Minimal cough. No fever no diarrhea. Discussed with RN. Discussed with Dr. Jonnathan Sanchez and will request dr. Maria Isabel Moreno for thoracentesis tomorrow on left side since she had thoracentesis done before by Dr. Annalise Carney on 1/29/2021 and pleural effusion is coming back. Denies having nausea, vomiting or diarrhea. Review of Systems   as above        Intake/Output Summary (Last 24 hours) at 2/14/2021 1233  Last data filed at 2/14/2021 0400  Gross per 24 hour   Intake 398 ml   Output 1250 ml   Net -852 ml       Vitals:  BP (!) 88/49   Pulse 72   Temp 97.8 °F (36.6 °C) (Oral)   Resp 21   Ht 5' 3\" (1.6 m)   Wt 161 lb 13.1 oz (73.4 kg)   SpO2 93%   BMI 28.66 kg/m²   EXAM:  General: On high flow O2 via nasal cannula. Comfortable in bed, No distress. Head: Atraumatic ,Normocephalic   Eyes: PERRL. No sclera icterus. No conjunctival injection. No discharge   ENT: No nasal  discharge. Pharynx clear. Neck:  Trachea midline. No thyromegaly, no JVD, No cervical adenopathy. Resp : Normal effort,  No accessory muscle use. Diminished breath sound at both basilar area more on left side with few left basilar rales. No wheezing. No rhonchi. CV: Normal  rate. Regular rhythm. No mumur ,  Rub or gallop  ABD: Non-tender. Non-distended. No masses. Noorganmegaly. Normal bowel sounds. No hernia.   EXT: No Pitting, No Cyanosis No clubbing    No results found for: PHART, NMD3OUE, PO2ART, KJE9MNM, BEART, W2LSKAUG  No results found for: LACTA  O2 Device: High flow nasal cannula  O2 Flow Rate (L/min): 8 L/min    MV Settings:     FiO2 : 100 %    DIET LOW SODIUM 2 GM;    IV:    DOPamine Stopped (02/13/21 0400)    norepinephrine Stopped (02/14/21 1040)       Medications:  Scheduled Meds:   midodrine  5 mg Oral TID WC    furosemide  20 mg Oral Daily    citalopram  20 mg Oral Daily    levothyroxine  88 mcg Oral Daily    aspirin  81 mg Oral Daily    vitamin B-12  1,000 mcg Oral Daily    [Held by provider] dilTIAZem  120 mg Oral BID    rivaroxaban  15 mg Oral Daily    budesonide-formoterol  2 puff Inhalation BID    carvedilol  3.125 mg Oral BID    pantoprazole  40 mg Oral BID AC    potassium chloride  20 mEq Oral Daily with breakfast    sodium chloride flush  10 mL Intravenous 2 times per day       PRN Meds:  LORazepam, nitroGLYCERIN, simethicone, sodium chloride flush, promethazine **OR** ondansetron, polyethylene glycol, acetaminophen **OR** acetaminophen        Radiology      Echo Complete 2d W Doppler W Color    Result Date: 1/27/2021  Transthoracic Echocardiography Report (TTE)  Demographics  Patient Name   Chey Duggan        Gender              Female                 Dejon First  Patient Number [id number]          Race                                                   Ethnicity  Visit Number   [id number]         Room Number         B290  Corporate ID                     Date of Study       01/27/2021  Accession      [id number]        Referring Physician  Number  Date of Birth  07/12/1932        Sonographer         Rene STRONG  Age            80 year(s)        Interpreting        [facility])                                   Physician           Cardiology                                                       Rebekah Cormier MD Procedure Type of Study  TTE procedure:ECHO COMPLETE 2D W/DOP W/COLOR.  Procedure Date Date: 01/27/2021 Start: 10:26 AM Study Location: Portable Technical Quality: Adequate visualization Indications:Congestive heart failure. Patient Status: Routine Height: 64 inches Weight: 165 pounds BSA: 1.8 m^2 BMI: 28.32 kg/m^2 BP: 108/77 mmHg  Conclusions  Summary  Mitral annular calcification is present. 1+ MR  Left ventricular ejection fraction is visually estimated at 40%. E/A flow reversal noted. Suggestive of diastolic dysfunction. Signature  ----------------------------------------------------------------  Electronically signed by Kiesha Orozco MD(Interpreting  physician) on 01/27/2021 11:37 AM  ----------------------------------------------------------------  Findings Left Ventricle Left ventricular ejection fraction is visually estimated at 40%. E/A flow reversal noted. Suggestive of diastolic dysfunction. Right Ventricle Normal right ventricle structure and function. Normal right ventricle systolic pressure. Left Atrium Moderately dilated left atrium. Right Atrium Moderately enlarged right atrium size. Mitral Valve Mitral annular calcification is present. 1+ MR Tricuspid Valve Normal tricuspid valve structure and function. 1-2+ TR RVSP 38 mmHg Aortic Valve Aortic valve leaflets are moderately thickened. No AS No AR Pulmonic Valve The pulmonic valve was not well visualized . Pericardial Effusion No evidence of pericardial effusion. Pleural Effusion No evidence of pleural effusion. Aorta \ Miscellaneous The aorta is within normal limits. M-Mode Measurements (cm)  LVIDd: 4.39 cm                         LVIDs: 3.55 cm  IVSd: 1.43 cm                          IVSs: 1.55 cm  LVPWd: 1.5 cm                          LVPWs: 1.48 cm  Rt. Vent.  Dimension: 3.74 cm           AO Root Dimension: 3.62 cm                                         ACS: 1.25 cm                                         LA: 4.54 cm                                         LVOT: 2.04 cm Doppler Measurements:  AV Velocity:0.02 m/s                    MV Peak E-Wave: 0.85 m/s  AV Peak Gradient: 10.44 mmHg            MV Peak Systolic Pressure: 40.80 mmHg Aorta/ Miscellaneous Aorta  Aortic Root: 3.62 cm  LVOT Diameter: 2.04 cm    Xr Chest (2 Vw)    1. No evidence of pneumothorax. Resolution of left pleural effusion. Cardiac silhouette remains enlarged. Left lower lobe hazy opacity may represent atelectasis versus pneumonia or infiltrate. COMPARISON: No prior studies available for comparison. DIAGNOSIS:  Post left thoracentesis. Dr. Panchito Nolasco to read. COMMENTS: I50.43 Acute on chronic combined systolic and diastolic CHF (congestive heart failure) (HCC) ICD10 TECHNIQUE: XR CHEST (2 VW) FINDINGS: Left lower lobe opacity may represent atelectasis versus pneumonia or infiltrate. Interval resolution of left pleural effusion. No evidence of pneumothorax. Cardiac silhouette is enlarged. Visualized soft tissues, and osseous structures are unremarkable. Cta Chest W Wo Contrast    Result Date: 1/26/2021  EXAMINATION:  CHEST CTA WITH CONTRAST (PULMONARY EMBOLISM PROTOCOL) CLINICAL HISTORY:   PE   I50.43 Acute on chronic combined systolic and diastolic CHF (congestive heart failure) ([address] ICD10. Technique:  Spiral axial CT acquisition of the chest from the thoracic inlet to the upper abdomen following IV contrast.  2-D images were reconstructed in the sagittal and coronal planes. 3-D MIPS images were generated in the coronal and axial planes. Images were reviewed on the PACS workstation. All images including the 3-D MIPS were personally archived. Contrast:  IV administration of 100 ml Isovue 300 All CT scans at this facility use dose modulation, iterative reconstruction, and/or weight based dosing when appropriate to reduce radiation dose to as low as reasonably achievable. Comparison:  CTA chest 3/15/2019  RESULT: Limitations: Motion artifact.  Evaluation for thromboembolic disease:      - Right heart chambers:  No thromboembolic disease.      - Main pulmonary arteries:  No thromboembolic disease.      - Lobar pulmonary arteries:  No thromboembolic disease.        - Segmental pulmonary arteries:  No thromboembolic disease.        - Subsegmental pulmonary arteries:  Not well visualized due to motion. Lines, tubes, and devices:  None. Lung parenchyma and pleura: Tortuous course of the trachea. Central airways appear grossly patent. Moderate left and small right pleural effusions with associated atelectasis. Limitations in evaluation of the lung parenchyma secondary to motion. Other areas of probable scarring/atelectasis. No pneumothorax. Thoracic inlet, heart, and mediastinum: Visualized thyroid unremarkable. No axillary, mediastinal, or hilar lymphadenopathy. Ascending thoracic aorta aneurysmal, measuring around 5.0 cm, similar to prior. Descending thoracic aorta aneurysmal and measures  up to 4.8 cm, also similar to prior. Normal pulmonary artery size. Cardiomegaly, similar to prior Scattered coronary artery calcifications. Small pericardial effusion. Small hiatal hernia. Bones and soft tissues:  No destructive bone lesion or acute osseous findings. Osteopenia. Scoliosis and kyphosis and multilevel degenerative changes, grossly similar to prior. Chest wall unremarkable. Upper abdomen:  No acute abnormality in the imaged upper abdomen. Reflux of contrast into the hepatic veins, associated with right heart failure. Lower neck: Unremarkable. No CT evidence of pulmonary embolism. Moderate left and small right pleural effusions. Cardiomegaly and small pericardial effusion. Reflux of contrast into the hepatic veins, associated with right heart failure. 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 No lung consolidative opacity. Areas of probable atelectasis/scarring. Ct Abdomen Pelvis W Iv Contrast    Result Date: 2/8/2021  EXAMINATION: CT ABDOMEN PELVIS W IV CONTRAST DATE AND TIME:2/8/2021 10:36 AM CLINICAL HISTORY: Acute abdominal pain. Epigastric pain. epigastric pain  COMPARISON: None available. TECHNIQUE: Contiguous axial CT sections of the abdomen and pelvis. 100 cc's of IV contrast given. .  All CT scans at this facility use dose modulation, iterative reconstruction, and/or weight based dosing when appropriate to reduce radiation dose to as low as reasonably achievable. FINDINGS   Liver: Small hypodensities in the right hepatic lobe the largest measuring 8 mm consistent with small cysts. Spleen: Negative    Pancreas: Negative     Kidney: Negative   Adrenal glands are negative. Bowel: Negative    Nodes: No lymphadenopathy. Aorta: Dilated descending thoracic aorta maximum diameter approximately 4 cm adjacent appearance from the CT scans from January 25, 2021. No infrarenal abdominal aortic aneurysm. Peritoneum: No free fluid or free air. The abdominal wall is intact. Pelvis : 9.4 x 6.5 x 6.6 cm cystic mass in the left adnexa. This appears to be a chronic finding this patient was present on an MRI scan that included this area back in April 2016. Bladder catheter in place with decompressed bladder. Bones: No acute osseous abnormalities. Lung bases: Persistent bibasilar effusions not atelectasis left greater than right. PERSISTENT BIBASILAR PLEURAL-PARENCHYMAL CHANGES. NO ACUTE PATHOLOGY IN THE ABDOMEN OR PELVIS. Xr Chest Portable    Result Date: 2/13/2021  EXAMINATION: XR CHEST PORTABLE. DATE AND TIME:2/13/2021 3:16 AM CLINICAL HISTORY: Shortness of breath   hypoxia  COMPARISONS: February 10, 2021  FINDINGS: Persistent extensive opacity throughout the left hemithorax consistent with effusion and consolidation. Minimal pleural parenchymal changes at the right base. Overall findings are unchanged     No significant change     Xr Chest Portable    Result Date: 2/10/2021  EXAMINATION: CHEST PORTABLE VIEW  CLINICAL HISTORY: Hypoxia COMPARISONS: February 8, 2021 0831 hours  FINDINGS: Single  views of the chest is submitted.   The cardiac silhouette is enlarged Pulmonary vascular remains mildly congested. Right sided trachea. Patchy to coalescent infiltrate within the right lower lobe as well as an area of infiltrate/consolidation left lower lobe with trace right pleural effusion and left pleural effusion. .  No Pneumothoraces. PULMONARY VASCULAR REMAINS MILDLY CONGESTED. COULD SUGGEST COMPONENT OF UNDERLYING CHF. CORRELATE CLINICALLY PATCHY TO COALESCENT INFILTRATE WITHIN THE RIGHT LOWER LOBE AS WELL AS AN AREA OF INFILTRATE/CONSOLIDATION LEFT LOWER LOBE WITH TRACE RIGHT PLEURAL EFFUSION AND LEFT PLEURAL EFFUSION. Anupam Degroot Chest Portable    Result Date: 2/8/2021  Exam: XR CHEST PORTABLE History: Shortness of breath. Chest pain. Technique: AP portable view of the chest obtained. Comparison: Portable chest radiograph from February 7, 2021 Findings: Heart size is enlarged. Small bilateral pleural effusions. Bibasilar opacities, left greater than right. No pneumothorax. No acute osseous abnormality. Small bilateral pleural effusions, left greater than right. Bibasilar atelectasis and/or infiltrate, left greater than right. Xr Chest Portable    Result Date: 2/8/2021  EXAMINATION: XR CHEST PORTABLE REASON FOR EXAM: chest pain FINDINGS: Frontal view of the chest reveals cardiomegaly and prominent central pulmonary vasculature consistent with chronic congestion. Findings similar and unchanged from 2/3/2021. Opacification at the left base consistent with residual pneumonitis/atelectasis remains unchanged from previous study 4 days earlier. PNEUMONIA/ATELECTASIS LEFT BASE. CARDIOMEGALY. MILD CHRONIC CENTRAL VASCULAR CONGESTION. NO SIGNIFICANT CHANGE FROM 2/3/2020     Xr Chest Portable    Result Date: 2/4/2021  EXAMINATION: XR CHEST PORTABLE CLINICAL HISTORY: SHORTNESS OF BREATH, CHEST PAIN COMPARISONS: CTA CHEST, JANUARY 25, 2021.  CHEST RADIOGRAPHS JANUARY 28 [facility] 29, 2021. FINDINGS: Osseous structures intact. Cardiopericardial silhouette enlarged and unchanged. Cephalization pulmonary vasculature. No focal airspace disease. Increased opacity left lower lung obscures left diaphragm. STABLE MARKED CARDIOMEGALY. PROBABLE INTERSTITIAL EDEMA. LEFT LOWER LUNG ATELECTASIS/PNEUMONIA. Xr Chest Portable    Result Date: 1/28/2021  EXAMINATION: CHEST PORTABLE VIEW  CLINICAL HISTORY: Short of breath COMPARISONS: None  FINDINGS: Single  views of the chest is submitted. The cardiac silhouette is enlarged. Pulmonary vascular unremarkable. Right sided trachea. Left lower lobe infiltrate/consolidation, collapse with left sided pleural effusion. LEFT LOWER LOBE INFILTRATE/CONSOLIDATION, COLLAPSE WITH LEFT SIDED PLEURAL EFFUSION    Xr Chest Portable    Result Date: 1/26/2021  EXAMINATION: Portable AP ERECT view of the chest. CLINICAL HISTORY:  SOB , Negative Covid-19 test. DATE: 1/25/2021 5:41 PM COMPARISONS: 7/24/2017 FINDINGS: The heart is moderately enlarged, similar to prior exam.  Prominent interstitial markings, consistent with increasing Central vascular congestion. The costophrenic angles are blunted secondary to pleural effusions bilaterally, left greater than  right. No pneumothorax. There are infiltrates/atelectasis within lung bases, left greater than right. There are moderate degenerative changes in spine. CARDIAC ENLARGEMENT WITH CENTRAL VESSEL CONGESTION AND BILATERAL PLEURAL EFFUSIONS, POSSIBLE CONGESTIVE HEART FAILURE. BIBASILAR INFILTRATES/ATELECTASIS, LEFT GREATER THAN RIGHT. Ir Guided Thoracentesis Pleural    Technically successful ultrasound-guided thoracentesis without immediate complications. HISTORY: Joanna Lorenz is a Female of 80 years age.  DIAGNOSIS: Left pleural effusion COMMENTS: I50.43 Acute on chronic combined systolic and diastolic CHF (congestive heart failure) ([facility] [address] ICD10 PROCEDURE: Following the discussion of procedure, risks versus benefits, possible complications, informed consent was obtained. Following universal protocol, patient and site verification was performed with a \"timeout\" prior to the procedure. Brief survey of the patient's left hemithorax demonstrate moderate amount of pleural effusion. After selection of an appropriate site for thoracentesis, the thoracic skin was prepped and draped in usual sterile fashion. Under ultrasound guidance, using lidocaine for local anesthesia, a 19-gauge [employer] clubeh catheter was inserted in the pleural cavity until effusion was aspirated. Using Vacutainer bottles, 755 mL of clear yellow effusion was drained. The catheter was removed and the aspiration site dressed. The patient tolerated procedure well. No immediate complications were identified. Subsequent chest radiograph demonstrated no evidence of pneumothorax. The patient left the radiology department in stable condition. Radiology nurse monitored patient's  vital signs throughout the procedure which lasted approximately 30 minutes. Results:  CBC:   Recent Labs     02/12/21  0721 02/13/21  0506 02/14/21  0530   WBC 10.0 13.7* 9.9   HGB 11.1* 11.9* 11.7*   HCT 33.7* 35.9* 36.1*   MCV 87.2 87.5 87.9    377 355     :   Recent Labs     02/12/21  0721 02/13/21  0506 02/14/21  0530    139 139   K 3.7 4.1 3.7    101 101   CO2 29 26 27   BUN 18 19 17   CREATININE 0.74 0.94* 0.89     LIVER PROFILE:   Recent Labs     02/13/21  0506   AST 20   ALT 21   BILITOT 0.5   ALKPHOS 49     Assessment:  1. Acute respiratory failure on high flow O2 15 L  2. Paroxysmal atrial fibrillation. 3.  Acute on chronic combined systolic and diastolic heart failure. 4.  Hypotension probably due to hypovolemia  5. Left pleural effusion status post thoracentesis.   Exudative lymphocyte predominant     Suggestion:  She is on high flow oxygen with 8 L via nasal cannula. Chest x-ray shows worsening left pleural effusion and consolidation. She is afebrile and WBC came down hold off on antibiotic for now. Patient has Zeng  catheter , Patient is on DVT and GI prophylaxis. Cardiology is following. Continue bronchodilator therapy. I discussed with Dr. Marshall Gonzalez. We will consult Dr. Beverly [facility] time spent reviewing labs/films, examining patient, collaborating with Janie Santiago but excluding procedures for life threatening organ failure is 33 minutes.       SIGNATURE: Rosendo Butcher MD, [facility]

## 2021-02-14 NOTE — PROGRESS NOTES
[facility] OCCUPATIONAL THERAPY EVALUATION - ACUTE     NAME: Holly Porter  : 1932 (80 y.o.)  MRN: [id number]  CODE STATUS: Full Code  Room: [place] [address]-01    Date of Service: 2021    Patient Diagnosis(es): Acute on chronic combined systolic (congestive) and diastolic (congestive) heart failure ([address] [I50.43]   No chief complaint on file.     Patient Active Problem List    Diagnosis Date Noted    Paroxysmal atrial fibrillation [facility])      Priority: High    Impaired mobility and activities of daily living due to CHF exac, [id number] [facility] admit       Priority: High    Lumbar stenosis with neurogenic claudication 2016     Priority: Medium    Acute on chronic combined systolic (congestive) and diastolic (congestive) heart failure (HCC) 2021    Pleural effusion 2021    Hypoxia     Acute pulmonary edema (HCC)     Gait abnormality 2021    Acute on chronic combined systolic and diastolic CHF (congestive heart failure) ([address] 2021    Essential hypertension 2018    Shortness of breath     Dizziness     Ascending aortic aneurysm (HCC) 2017    Descending aortic aneurysm ([address] 2017    Osteoarthritis     Myelopathy ([address]     Headache     Depression     Acquired scoliosis 2016    Osteoporosis 2016    Charcot Arleen Tooth muscular atrophy 2016    Excess weight 2016    Osteoarthritis of lumbar spine with myelopathy 2016        Past Medical History:   Diagnosis Date    Ascending aortic aneurysm ([address] 2017    Charcot Arleen Tooth muscular atrophy     Depression     Descending aortic aneurysm ([address] 2017    Essential hypertension 2018    Headache     HTN (hypertension)     Impaired mobility and activities of daily living     Lumbar stenosis with neurogenic claudication     Myelopathy ([address]     Osteoarthritis      Past Surgical History:   Procedure Laterality Date    JOINT REPLACEMENT      THORACENTESIS Left 01/29/2021    total of 755 cc removed per Dr Jabari Montgomery specimen sent to lab        Restrictions  Restrictions/Precautions: Fall Risk     Safety Devices: Safety Devices  Safety Devices in place: Yes  Type of devices: All fall risk precautions in place        Subjective  Pre Treatment Pain Screening  Pain at present: 0  Scale Used: Faces  Intervention List: Patient able to continue with treatment    Pain Reassessment:   Pain Assessment  Patient Currently in Pain: No  Pain Assessment: Faces  Pain Level: 0       Prior Level of Function:  Social/Functional History  Lives With: Alone  Type of Home: House  Home Layout: One level  Home Access: Stairs to enter with rails  Entrance Stairs - Number of Steps: 2  Entrance Stairs - Rails: Both  Bathroom Shower/Tub: Tub/Shower unit  Bathroom Equipment: Grab bars in shower, Shower chair  Home Equipment: Rolling walker, Cane(Infrequently uses equipment and furniture walks)  ADL Assistance: [address] [place] [address] Avenue: Independent  Homemaking Responsibilities: Yes  Ambulation Assistance: Independent  Transfer Assistance: Independent  Additional Comments: Son stops over 2 times daily    OBJECTIVE:     Orientation Status:  Orientation  Overall Orientation Status: Within Functional Limits    Observation:  Observation/Palpation  Posture: Fair  Observation: Patient agreeable to OT evaluation; O2 via nasal canula    Cognition Status:  Cognition  Overall Cognitive Status: WFL  Arousal/Alertness: Appropriate responses to stimuli  Following Commands:  Follows all commands without difficulty  Attention Span: Appears intact  Memory: (Unable to assess d/t language barrier)  Safety Judgement: Decreased awareness of need for safety  Problem Solving: Assistance required to identify errors made  Insights: Decreased awareness of deficits  Initiation: Does not require cues  Sequencing: Does not require cues  Cognition Comment: Some limitations d/t language barrier but appears WFL    Perception Status:  Perception  Overall Perceptual Status: WFL    Sensation Status:  Sensation  Overall Sensation Status: WFL    Vision and Hearing Status:  Vision  Vision: Within Functional Limits  Hearing  Hearing: Exceptions to [facility]  Hearing Exceptions: Hard of hearing/hearing concerns, No hearing aid     ROM:   LUE AROM (degrees)  LUE AROM : WFL  Left Hand AROM (degrees)  Left Hand AROM: WFL  RUE AROM (degrees)  RUE AROM : WFL  Right Hand AROM (degrees)  Right Hand AROM: WFL    Strength:  LUE Strength  Gross LUE Strength: Exceptions to [facility]  L Hand General: 3+/5  LUE Strength Comment: Gross Assessment: 3+/5  RUE Strength  Gross RUE Strength: Exceptions to [facility]  R Hand General: 3+/5  RUE Strength Comment: Gross Assessment: 3+/5    Coordination, Tone, Quality of Movement:    Tone RUE  RUE Tone: Normotonic  Tone LUE  LUE Tone: Normotonic  Coordination  Movements Are Fluid And Coordinated: Yes    Hand Dominance:  Hand Dominance  Hand Dominance: Right    ADL Status:  ADL  Feeding: Independent  Grooming: Setup  UE Bathing: Stand by assistance  LE Bathing: Minimal assistance  UE Dressing: Setup  LE Dressing: Minimal assistance  Toileting: Minimal assistance  Additional Comments: Simulated ADLs as above  Toilet Transfers  Toilet - Technique: Ambulating  Equipment Used: Grab bars  Toilet Transfer: Minimal assistance  Toilet Transfers Comments: Anticipate SBA  Tub Transfers  Tub Transfers: Not tested    Therapy key for assistance levels -   Independent = Pt. is able to perform task with no assistance but may require a device   Stand by assistance = Pt. does not perform task at an independent level but does not need physical assistance, requires verbal cues  Minimal, Moderate, Maximal Assistance = Pt. requires physical assistance (25%, 50%, 75% assist from helper) for task but is able to actively participate in task   Dependent = Pt. requires total assistance with task and is not able to actively participate with task completion     Functional Mobility:  Functional Mobility  Functional - Mobility Device: Rolling Walker  Activity: Other  Assist Level: Contact guard assistance  Functional Mobility Comments: Verbal cues required for safety awareness  Transfers  Sit to stand: Minimal assistance  Stand to sit: Minimal assistance  Transfer Comments: Verbal cues for safe technique    Bed Mobility  Bed mobility  Supine to Sit: Moderate assistance(HOB elevated)  Sit to Supine: Minimal assistance  Scooting: Supervision  Comment: HOB flat, bed rail utilized    Seated and Standing Balance:  Balance  Sitting Balance: Supervision  Standing Balance: Contact guard assistance    Functional Endurance:  Activity Tolerance  Activity Tolerance: Patient Tolerated treatment well  Activity Tolerance: 6L O2    D/C Recommendations:  OT D/C RECOMMENDATIONS  REQUIRES OT FOLLOW UP: Yes    Equipment Recommendations:  OT Equipment Recommendations  Equipment Needed: Yes  ADL Assistive Devices: Reacher, Sock-Aid Hard, Long-handled Sponge, Long-handled Shoe Horn    OT Education:   OT Education  OT Education: OT Role, Plan of Care  Patient Education: Educated pt. on role of acute care OT  Barriers to Learning: None    OT Follow Up:  OT D/C RECOMMENDATIONS  REQUIRES OT FOLLOW UP: Yes       Assessment/Discharge Disposition:  Assessment: Pt is an 79 y/o female from home alone who [facility] to [facility] with the above deficits which impact her independence and safety during ADLs. Pt limited d/t fatigue, weakness and decreased endurance. Pt would benefit from OT  to maximize independence and safety during ADLs.   Performance deficits / Impairments: Decreased functional mobility , Decreased balance, Decreased ADL status, Decreased high-level IADLs, Decreased endurance, Decreased strength, Decreased posture, Decreased safe awareness  Prognosis: Good  Discharge Recommendations: Continue to assess pending progress  Decision Making: Medium Complexity  History: Pt's medical history is moderately complex  Exam: 8 perf deficits  Assistance / Modification: Pt. requires min A    Six Click Score   How much help for putting on and taking off regular lower body clothing?: A Little  How much help for Bathing?: A Little  How much help for Toileting?: A Little  How much help for putting on and taking off regular upper body clothing?: A Little  How much help for taking care of personal grooming?: A Little  How much help for eating meals?: None  AM-PAC Inpatient Daily Activity Raw Score: 19  AM-PAC Inpatient ADL T-Scale Score : 40.22  ADL Inpatient CMS 0-100% Score: 42.8    Plan:  Plan  Times per week: 1-3x  Plan weeks: Length of acute stay  Current Treatment Recommendations: Strengthening, Endurance Training, Neuromuscular Re-education, Self-Care / ADL, Balance Training, Functional Mobility Training, Cognitive Reorientation, Home Management Training, Safety Education & Training, Equipment Evaluation, Education, & procurement, Patient/Caregiver Education & Training    Goals:   Patient will:    - Improve functional endurance to tolerate/complete 30 mins of ADL's  - Be Modified Independent in UB ADLs   - Be Modified Independent in LB ADLs  - Be Modified Independent in ADL transfers without LOB  - Be Modified Independent in toileting tasks  - Improve bilateral UE strength and endurance to 4/5 in order to participate in self-care activities as projected. - Access appropriate D/C site with as few architectural barriers as possible. - Sequence self-care tasks with no cues for safety awareness. - Other :  Improve sitting/standing balance to complete ADLs as projected    Patient Goal: Patient goals :  To go home     Discussed and agreed upon: Yes Comments:     Therapy Time:   OT Individual Minutes  Time In: 0764  Time Out: 1407  Minutes: 15    Eval: 15 minutes     Electronically signed by:    SERAFIN Duarte  2/14/2021, 2:23 PM

## 2021-02-14 NOTE — PROGRESS NOTES
Better appetite than yesterday. Ate 100% of breakfast. Weaker than yesterday when ambulating to toilet. Also, c/o dizziness when walking, needed 2 assist to get back to the bed, will use bedpan next time. Levophed off and on Dr Thiago Baumann notified; ordered received. Dr. Thiago Baumann doesn't feel the patient's BP is safe to be gettign out of the bed. Daughter here visiting   1400 patient very upset she cannot get up and go to the commode. Several nurses offered the bedpan. Patient did not want it and threw it ( in frustration not to harm staff). Nurse explained several times about the safety concern for low BP, dizzy, risk of falling while on a anticoagulant and bleeding. 1730 patient ate 50% of dinner . Brushed teeth. Daughter here visiting. 1830 patient screaming out because she wants to get up and go to the toilet. BP is 79/31. lEVO RESTARTED and patient reminded of the safety and falls risk and Dr. Thiago Baumann. Patient continued to yell.

## 2021-02-14 NOTE — PROGRESS NOTES
Subjective: The patient complains of severe acute on chronic progressive fatigue and pain on exertion shortness of breath partially relieved by rest, PT, OT and meds and exacerbated by exertion and recent illness. ROS x10: The patient also complains of severely impaired mobility and activities of daily living. Otherwise no new problems with vision, hearing, nose, mouth, throat, dermal, cardiovascular, GI, , pulmonary, musculoskeletal, psychiatric or neurological. See Rehab consult on Rehab chart . Vital signs:  BP (!) 88/49   Pulse 72   Temp 97.8 °F (36.6 °C) (Oral)   Resp 21   Ht 5' 3\" (1.6 m)   Wt 161 lb 13.1 oz (73.4 kg)   SpO2 93%   BMI 28.66 kg/m²   I/O:   PO/Intake:     No problems with PO intake, low-sodium no milk. Bowel/Bladder:   continent,    General:  Patient is well developed, adequately nourished, non-obese and     well kempt. HEENT:    PERRLA, hearing intact to loud voice, external inspection of ear     and nose benign. Inspection of lips, tongue and gums benign  Musculoskeletal: No significant change in strength or tone. All joints stable. Inspection and palpation of digits and nails show no clubbing,       cyanosis or inflammatory conditions. Neuro/Psychiatric: Affect: flat-  Alert and oriented to self and     Situation with  min cues. No significant change in deep tendon reflexes or     sensation  Lungs:  Diminished, CTA-B. Respiration effort is normal at rest.  MOLINA  Heart:   S1 = S2,    irreg . No loud murmurs. Abdomen:  Soft, non-tender, no enlargement of liver or spleen. Extremities:  No significant lower extremity edema or tenderness. Skin:    BUE bruises dt blood draws, no visualized or palpated problems.     Rehabilitation:  Physical therapy: FIMS:  Bed Mobility: Scooting: Supervision    Transfers: Sit to Stand: Minimal Assistance(bed height elevated)  Stand to sit: Contact guard assistance  Bed to Chair: Stand by assistance, Ambulation Previous extensive, complex labs, notes and diagnostics reviewed and analyzed. ALLERGIES:    Allergies as of 02/09/2021 - Review Complete 02/09/2021   Allergen Reaction Noted    Latex  07/19/2017    Tape [adhesive tape]  06/28/2016      (please also verify by checking [facility])     Complex Physical Medicine & Rehab Issues Assess & Plan:   1. Severe abnormality of gait and mobility and impaired self-care and ADL's secondary to progressive toxic encephalopathy due to recent CHF and COPD exacerbation. Functional and medical status reassessed regarding patients ability to participate in therapies and patient found to be able to participate in  acute intensive comprehensive inpatient rehabilitation program including PT/OT to improve balance, ambulation, ADLs, and to improve the P/AROM. It is my opinion that they will be able to tolerate 3 hours of therapy a day and benefit from it at an acute level. 2. Bowel constipation and Bladder dysfunction overactive bladder:  frequent toileting, ambulate to bathroom with assistance, check post void residuals. Check for C.difficile x1 if >2 loose stools in 24 hours, continue bowel & bladder program.  Monitor for UTI symptoms including lethargy and confusion  3. Severe mid and low back pain and generalized OA pain: reassess pain every shift and prior to and after each therapy session, give prn  Tylenol, modalities prn in therapy, consider Lidoderm, K-pad prn.   4. Skin breakdown risk:  continue pressure relief program.  Daily skin exams and reports from nursing. 5. Severe fatigue due to immobility and nutritional deficits: Add vitamin B12 vitamin D and CoQ10 titrate dosing and add protein supplementation with low carb content. 6. Complex discharge planning:   Await medical stability to consider transfer back to acute rehab versus lower level of care if patient is not able to tolerate rehab.   However I think because of her medical problems she will need close monitoring from medical standpoint it would like to try to get her back to rehab if we can. Complex Active General Medical Issues that complicate care Assess & Plan:     1.    Patient Active Problem List   Diagnosis    Lumbar stenosis with neurogenic claudication    Acquired scoliosis    Osteoporosis    Charcot Arleen Tooth muscular atrophy    Excess weight    Osteoarthritis of lumbar spine with myelopathy    Osteoarthritis    [facility]    Impaired mobility and activities of daily living due to CHF exac, [facility] admit 2021    Headache    Depression    Ascending aortic aneurysm (HCC)    Descending aortic aneurysm (HCC)    Dizziness    Shortness of breath    Essential hypertension    Acute on chronic combined systolic and diastolic CHF (congestive heart failure) (HCC)    Gait abnormality    Paroxysmal atrial fibrillation (HCC)    Pleural effusion    Hypoxia    Acute pulmonary edema (HCC)    Acute on chronic combined systolic (congestive) and diastolic (congestive) heart failure ([address]   not medically stable for rehab transfer, will follow and address rehab needs, awaiting repeat thoracentesis tomorrow,with cytology  JAYLENE Nur MD.O., PM&R     Attending    [address]

## 2021-02-14 NOTE — PROGRESS NOTES
Hospitalist Progress Note      PCP: Brooke Osei MD    Date of Admission: 2/9/2021    Chief Complaint: weakness    Subjective: pt awake/alert     Medications:  Reviewed    Infusion Medications    DOPamine Stopped (02/13/21 0400)    norepinephrine 4 mcg/min (02/14/21 0126)     Scheduled Medications    midodrine  5 mg Oral TID WC    furosemide  20 mg Oral Daily    citalopram  20 mg Oral Daily    levothyroxine  88 mcg Oral Daily    aspirin  81 mg Oral Daily    vitamin B-12  1,000 mcg Oral Daily    dilTIAZem  120 mg Oral BID    rivaroxaban  15 mg Oral Daily    budesonide-formoterol  2 puff Inhalation BID    [Held by provider] carvedilol  3.125 mg Oral BID    pantoprazole  40 mg Oral BID AC    potassium chloride  20 mEq Oral Daily with breakfast    sodium chloride flush  10 mL Intravenous 2 times per day     PRN Meds: LORazepam, nitroGLYCERIN, simethicone, sodium chloride flush, promethazine **OR** ondansetron, polyethylene glycol, acetaminophen **OR** acetaminophen      Intake/Output Summary (Last 24 hours) at 2/14/2021 1104  Last data filed at 2/14/2021 0400  Gross per 24 hour   Intake 398 ml   Output 1250 ml   Net -852 ml       Exam:    /67   Pulse 63   Temp 98 °F (36.7 °C) (Oral)   Resp 24   Ht 5' 3\" (1.6 m)   Wt 161 lb 12.8 oz (73.4 kg)   SpO2 100%   BMI 28.66 kg/m²     General appearance: No apparent distress, appears stated age and cooperative. HEENT: Pupils equal, round, and reactive to light. Conjunctivae/corneas clear. Neck: Supple, with full range of motion. No jugular venous distention. Trachea midline. Respiratory: markedly decreased BS on L side   Cardiovascular: Regular rate and rhythm with normal S1/S2 without murmurs, rubs or gallops. Abdomen: Soft, non-tender, non-distended with normal bowel sounds. Musculoskeletal: No clubbing, cyanosis or edema bilaterally. Full range of motion without deformity.   Skin: Skin color, texture, turgor normal.  No rashes or lesions. Neurologic:  Neurovascularly intact without any focal sensory/motor deficits. Cranial nerves: II-XII intact, grossly non-focal.  Psychiatric: Alert and oriented, thought content appropriate, normal insight  Capillary Refill: Brisk,< 3 seconds   Peripheral Pulses: +2 palpable, equal bilaterally       Labs:   Recent Labs     02/12/21  0721 02/13/21  0506 02/14/21  0530   WBC 10.0 13.7* 9.9   HGB 11.1* 11.9* 11.7*   HCT 33.7* 35.9* 36.1*    377 355     Recent Labs     02/12/21  0721 02/13/21  0506 02/14/21  0530    139 139   K 3.7 4.1 3.7    101 101   CO2 29 26 27   BUN 18 19 17   CREATININE 0.74 0.94* 0.89   CALCIUM 8.7 8.9 8.8     Recent Labs     02/13/21  0506   AST 20   ALT 21   BILITOT 0.5   ALKPHOS 49     No results for input(s): INR in the last 72 hours. Recent Labs     02/13/21  0506   TROPONINI <0.010       Urinalysis:      Lab Results   Component Value Date    NITRU Negative 02/07/2021    WBCUA 3-5 02/07/2021    BACTERIA Negative 02/07/2021    RBCUA 0-2 02/07/2021    BLOODU TRACE 02/07/2021    SPECGRAV 1.019 02/07/2021    GLUCOSEU Negative 02/07/2021       Radiology:  XR CHEST PORTABLE   Final Result   No significant change                  XR CHEST PORTABLE   Final Result   PULMONARY VASCULAR REMAINS MILDLY CONGESTED. COULD SUGGEST COMPONENT OF UNDERLYING CHF. CORRELATE CLINICALLY   PATCHY TO COALESCENT INFILTRATE WITHIN THE RIGHT LOWER LOBE AS WELL AS AN AREA OF INFILTRATE/CONSOLIDATION LEFT LOWER LOBE WITH TRACE RIGHT PLEURAL EFFUSION AND LEFT PLEURAL EFFUSION. Windy Guy                 Assessment/Plan:    Active Hospital Problems    Diagnosis Date Noted    Paroxysmal atrial fibrillation [facility]) [I48.0]      Priority: High    Impaired mobility and activities of daily living due to CHF [facility] admit 2021 [Z74.09, Z78.9]      Priority: High    Lumbar stenosis with neurogenic claudication [M48.062] 06/02/2016     Priority: Medium    Acute on chronic combined systolic

## 2021-02-14 NOTE — PROGRESS NOTES
Physical Therapy Med Surg Initial Assessment  Facility/Department: [facility]  Room: [facility]/[place] [address]       NAME: Katherine Garcia  : 1932 (80 y.o.)  MRN: [id number]  CODE STATUS: Full Code    Date of Service: 2021    Patient Diagnosis(es): Acute on chronic combined systolic (congestive) and diastolic (congestive) heart failure ([address] [I50.43]   No chief complaint on file.     Patient Active Problem List    Diagnosis Date Noted    Paroxysmal atrial fibrillation [facility])      Priority: High    Impaired mobility and activities of daily living [facility]       Priority: High    Lumbar stenosis with neurogenic claudication 2016     Priority: Medium    Acute on chronic combined systolic (congestive) and diastolic (congestive) heart failure (HCC) 2021    Pleural effusion 2021    Hypoxia     Acute pulmonary edema (HCC)     Gait abnormality 2021    Acute on chronic combined systolic and diastolic CHF (congestive heart failure) ([address] 2021    Essential hypertension 2018    Shortness of breath     Dizziness     Ascending aortic aneurysm (HCC) 2017    Descending aortic aneurysm ([address] 2017    Osteoarthritis     Myelopathy ([address]     Headache     Depression     Acquired scoliosis 2016    Osteoporosis 2016    Charcot Arleen Tooth muscular atrophy 2016    Excess weight 2016    Osteoarthritis of lumbar spine with myelopathy 2016        Past Medical History:   Diagnosis Date    Ascending aortic aneurysm ([address] 2017    Charcot Arleen Tooth muscular atrophy     Depression     Descending aortic aneurysm ([address] 2017    Essential hypertension 2018    Headache     HTN (hypertension)     Impaired mobility and activities of daily living     Lumbar stenosis with neurogenic claudication     Myelopathy ([address]     Osteoarthritis      Past Surgical History:   Procedure Laterality Date    JOINT REPLACEMENT      THORACENTESIS Left 01/29/2021    total of 755 cc removed per Dr Amie Bonner specimen sent to lab       Chart Reviewed: Yes  Patient assessed for rehabilitation services?: Yes  Family / Caregiver Present: No  General Comment  Comments: PT/OT co-eval; pt impulsive- increased desire to have BM on commode without following safety instructions    Restrictions:  Restrictions/Precautions: Fall Risk(high fall risk per clinical judgement)     SUBJECTIVE: Subjective: \"I can't go on the bedpan\"    Pain  Pre Treatment Pain Screening  Pain at present: 0  Intervention List: Patient able to continue with treatment    Post Treatment Pain Screening:   Pain Assessment  Pain Level: 0    Prior Level of Function:  Social/Functional History  Lives With: Alone  Type of Home: House  Home Layout: One level  Home Access: Stairs to enter with rails  Entrance Stairs - Number of Steps: 2  Entrance Stairs - Rails: Both  Bathroom Shower/Tub: Tub/Shower unit  Bathroom Equipment: Grab bars in shower, Shower chair  Home Equipment: Rolling walker, Cane(Infrequently uses equipment and furniture walks)  ADL Assistance: [address] [place] [address] Avenue: Independent  Homemaking Responsibilities: Yes  Ambulation Assistance: Independent  Transfer Assistance: Independent  Additional Comments: Son stops over 2 times daily    OBJECTIVE:   Vision: Within Functional Limits  Hearing: Exceptions to [facility]  Hearing Exceptions: Hard of hearing/hearing concerns; No hearing aid    Cognition:  Overall Orientation Status: Within Functional Limits  Orientation Level: Oriented to person  Follows Commands: Impaired(difficulty following 1 step instructions due to language barrier)    Observation/Palpation  Observation: O2 via nasal canula; taylor; RN stating BP is low- 97/64 supine in bed; 121/73 seated on commode    ROM:  RLE PROM: WFL  LLE PROM: WFL    Strength:  Strength RLE  Comment: 4-/5 except 2-/5 df  Strength LLE  Comment: 4-/5 except 2-/5 df    Neuro:  Balance  Posture: Fair(slouched posture)  Sitting - Static: Good;-(supervision)  Sitting - Dynamic: Good;-(supervision)  Standing - Static: Fair;-(SBA with 2ww)  Standing - Dynamic: Fair;-(CGA with 2ww)     Tone RLE  RLE Tone: Normotonic  Tone LLE  LLE Tone: Normotonic  Sensation  Overall Sensation Status: WFL    Bed mobility  Supine to Sit: Minimal assistance(HOB maximally elevated)  Sit to Supine: Maximum assistance(max A to lift LEs into bed)    Transfers  Sit to Stand: Minimal Assistance(bed height elevated)  Stand to sit: Contact guard assistance  Comment: rollator    Ambulation  Ambulation?: Yes  Ambulation 1  Surface: level tile  Device: Rolling Walker  Other Apparatus: O2  Assistance: Minimal assistance  Quality of Gait: steppage gait pattern  Gait Deviations: Slow Lea; Increased JAMES  Distance: 8 ft X 2    Activity Tolerance  Activity Tolerance: Patient limited by fatigue;Patient limited by endurance    PT Education  PT Education: Goals;PT Role;Transfer Training;Plan of Care;General Safety;Gait Training    ASSESSMENT:   Body structures, Functions, Activity limitations: Decreased functional mobility ; Decreased safe awareness;Decreased balance;Decreased ADL status; Decreased endurance;Decreased strength;Decreased posture  Decision Making: High Complexity  History: high  Exam: high  Clinical Presentation: high    Prognosis: Good    DISCHARGE RECOMMENDATIONS:  Discharge Recommendations: Continue to assess pending progress, Patient would benefit from continued therapy after discharge    Assessment: Pt exhibits low BP which decreases pt safety in standing, decreased LE strength, decreased activity tolerance for all out of bed activity, decreased balance; therefore, pt requires increased assistance for all functional mobility compared to PLOF. Continued PT is indicated.       REQUIRES PT FOLLOW UP: Yes      PLAN OF CARE:  Plan  Times per week: 3-6  Current Treatment Recommendations: Strengthening, Transfer Training, Endurance Training, Neuromuscular Re-education, Patient/Caregiver Education & Training, ROM, Wheelchair Mobility Training, Manual Therapy - Soft Tissue Mobilization, Pain Management, Equipment Evaluation, Education, & procurement, Balance Training, Gait Training, Home Exercise Program, Functional Mobility Training, Stair training, Safety Education & Training, Positioning  Safety Devices  Type of devices: Bed alarm in place, Call light within reach, Left in bed    Goals:  Long term goals  Long term goal 1: Pt will perform bed mobility with indep  Long term goal 2: Pt will perform functional transfers with indep  Long term goal 3: Pt will ambulate >/=50ft with 2ww and mod I  Long term goal 4: Pt will negotiate 4 steps with handrail and SBA    [facility] (6 CLICK) [address] Mobility Raw Score : 12    Therapy Time:   Individual   Time In [address]   Time Out 0209   Minutes [address] [facility], PT, 02/14/21 at 2:24 PM         Definitions for assistance levels  Independent = pt does not require any physical supervision or assistance from another person for activity completion. Device may be needed.   Stand by assistance = pt requires verbal cues or instructions from another person, close to but not touching, to perform the activity  Minimal assistance= pt performs 75% or more of the activity; assistance is required to complete the activity  Moderate assistance= pt performs 50% of the activity; assistance is required to complete the activity  Maximal assistance = pt performs 25% of the activity; assistance is required to complete the activity  Dependent = pt requires total physical assistance to accomplish the task

## 2021-02-14 NOTE — PROGRESS NOTES
Progress Note  Patient: Sara Pod  Unit/Bed: IC07/IC07-01  YOB: 1932  MRN: [id number]  Acct: [de-identified]   Admitting Diagnosis: Acute on chronic combined systolic (congestive) and diastolic (congestive) heart failure ([facility] [I50.43]  Admit Date:  2/9/2021  Hospital Day: 5    Chief Complaint:cad PSF NICM Weakness    Histories:  Past Medical History:   Diagnosis Date    [facility] 6/23/2017    Charcot Arleen Tooth muscular atrophy     Depression     [facility] 6/23/2017    Essential hypertension 2/16/2018    Headache     HTN (hypertension)     Impaired mobility and activities of daily living     Lumbar stenosis with neurogenic claudication     Myelopathy [facility]     Osteoarthritis      Past Surgical History:   Procedure Laterality Date    JOINT REPLACEMENT      THORACENTESIS Left 01/29/2021    total of 755 cc removed per Dr Bullard Gal specimen sent to lab     Family History   Problem Relation Age of Onset    Arthritis Mother     Arthritis Father     High Blood Pressure Father      Social History     Socioeconomic History    Marital status:      Spouse name: Not on file    Number of children: Not on file    Years of education: Not on file    Highest education level: Not on file   Occupational History    Not on file   Social Needs    Financial resource strain: Not on file    Food insecurity     Worry: Not on file     Inability: Not on file    Transportation needs     Medical: Not on file     Non-medical: Not on file   Tobacco Use    Smoking status: Never Smoker    Smokeless tobacco: Never Used   Substance and Sexual Activity    Alcohol use: No     Alcohol/week: 0.0 standard drinks    Drug use: No    Sexual activity: Not on file   Lifestyle    Physical activity     Days per week: 0 days     Minutes per session: 0 min    Stress:  Only a little   Relationships    Social connections     Talks on phone: More than three times a week     Gets together: More than three times a week     Attends [employer] service: 1 to 4 times per year     Active member of club or organization: No     Attends meetings of clubs or organizations: Never     Relationship status:     Intimate partner violence     Fear of current or ex partner: No     Emotionally abused: No     Physically abused: No     Forced sexual activity: No   Other Topics Concern    Not on file   Social History Narrative    Lives With: Alone, son lives down the street, dtr is in the area    Has a son in the area    Type of Home: [facility]  in [address]: One level    Home Access: Stairs to enter with rails- Number of Steps: 2- Rails: Both    Bathroom Shower/Tub: Tub/Shower unit, Bathroom Equipment: Grab bars in shower, Shower chair    Home Equipment: Rolling walker, Cane(Pt infrequemtly uses DME for ambulation and prefers to furniture walk in home)    ADL Assistance: Independent, [facility] Responsibilities: Yes    Ambulation Assistance: Independent, Transfer Assistance: Independent    Additional Comments: Son stops over 2 times daily           Subjective/HPI remains on high flow O2. HR drops while sleeping but comes right back up. On and off Levo. Currently off. EKG: AF 74        Review of Systems:   Review of Systems   Constitutional: Negative. Negative for diaphoresis and fatigue. HENT: Negative. Eyes: Negative. Respiratory: Negative. Negative for cough, chest tightness, shortness of breath, wheezing and stridor. Cardiovascular: Negative. Negative for chest pain, palpitations and leg swelling. Gastrointestinal: Negative. Negative for blood in stool and nausea. Genitourinary: Negative. Musculoskeletal: Negative. Skin: Negative. Neurological: Positive for weakness. Negative for dizziness, syncope and light-headedness. Hematological: Negative. Psychiatric/Behavioral: Negative. Physical Examination:    BP (!) 88/49   Pulse 72   Temp 97.8 °F (36.6 °C) (Oral)   Resp 21   Ht 5' 3\" (1.6 m)   Wt 161 lb 13.1 oz (73.4 kg)   SpO2 93%   BMI 28.66 kg/m²    Physical Exam   Constitutional: She appears healthy. No distress. HENT:   Normal cephalic and Atraumatic   Eyes: Pupils are equal, round, and reactive to light. Neck: Normal range of motion and thyroid normal. Neck supple. No JVD present. No neck adenopathy. No thyromegaly present. Cardiovascular: Normal rate, regular rhythm, intact distal pulses and normal pulses. Murmur heard. Pulmonary/Chest: Effort normal and breath sounds normal. She has no wheezes. She has no rales. She exhibits no tenderness. Abdominal: Soft. Bowel sounds are normal. There is no abdominal tenderness. Musculoskeletal: Normal range of motion. General: No tenderness or edema. Neurological: She is alert and oriented to person, place, and time. Skin: Skin is warm. No cyanosis. Nails show no clubbing.        LABS:  CBC:   Lab Results   Component Value Date    WBC 9.9 02/14/2021    RBC 4.10 02/14/2021    HGB 11.7 02/14/2021    HCT 36.1 02/14/2021    MCV 87.9 02/14/2021    MCH 28.5 02/14/2021    MCHC 32.4 02/14/2021    RDW 14.1 02/14/2021     02/14/2021     CBC with Differential:    Lab Results   Component Value Date    WBC 9.9 02/14/2021    RBC 4.10 02/14/2021    HGB 11.7 02/14/2021    HCT 36.1 02/14/2021     02/14/2021    MCV 87.9 02/14/2021    MCH 28.5 02/14/2021    MCHC 32.4 02/14/2021    RDW 14.1 02/14/2021    LYMPHOPCT 9.0 01/25/2021    MONOPCT 8.7 01/25/2021    BASOPCT 0.4 01/25/2021    MONOSABS 1.0 01/25/2021    LYMPHSABS 1.0 01/25/2021    EOSABS 0.1 01/25/2021    BASOSABS 0.0 01/25/2021     CMP:    Lab Results   Component Value Date     02/14/2021    K 3.7 02/14/2021    K 4.1 02/13/2021     02/14/2021    CO2 27 02/14/2021    BUN 17 02/14/2021    CREATININE 0.89 02/14/2021    GFRAA >60.0 02/14/2021    LABGLOM 59.8 02/14/2021    GLUCOSE 133 02/14/2021    PROT 6.9 02/13/2021    LABALBU 3.0 02/13/2021    CALCIUM 8.8 02/14/2021    BILITOT 0.5 02/13/2021    ALKPHOS 49 02/13/2021    AST 20 02/13/2021    ALT 21 02/13/2021     BMP:    Lab Results   Component Value Date     02/14/2021    K 3.7 02/14/2021    K 4.1 02/13/2021     02/14/2021    CO2 27 02/14/2021    BUN 17 02/14/2021    LABALBU 3.0 02/13/2021    CREATININE 0.89 02/14/2021    CALCIUM 8.8 02/14/2021    GFRAA >60.0 02/14/2021    LABGLOM 59.8 02/14/2021    GLUCOSE 133 02/14/2021     Magnesium:    Lab Results   Component Value Date    MG 1.7 02/14/2021     Troponin:    Lab Results   Component Value Date    TROPONINI <0.010 02/13/2021        Active Hospital Problems    Diagnosis Date Noted    Paroxysmal atrial fibrillation [facility]) [I48.0]      Priority: High    Impaired mobility and activities of daily living due to CHF [facility] admit 2021 [Z74.09, Z78.9]      Priority: High    Lumbar stenosis with neurogenic claudication [M48.062] 06/02/2016     Priority: Medium    Acute on chronic combined systolic (congestive) and diastolic (congestive) heart failure ([facility] [I50.43] 02/09/2021     Priority: Low    Hypoxia [R09.02]      Priority: Low    Osteoarthritis of lumbar spine with myelopathy [M47.16] 06/02/2016     Priority: Low        Assessment/Plan:  1. Respiratory failure with continue high O2 requirement  2. SHF- responded well. No further edema  3. Hypotension- will start Midodrine. Currently Coreg on hold. Will resume and hold CCB instead. She need BB for her Thoracic Aorta. 4. NICM EF 40%  5. Mid/Mod CAD - LAD 60%  6. Descending Aortic Dilation 4.8 cm   7. AF- rate controlled- on Xarelto continue rate control.         Electronically signed by Steve Bales MD on 2/14/2021 at 12:26 PM

## 2021-02-15 LAB
ANION GAP SERPL CALCULATED.3IONS-SCNC: 7 MEQ/L (ref 9–15)
BUN BLDV-MCNC: 23 MG/DL (ref 8–23)
CALCIUM SERPL-MCNC: 8.9 MG/DL (ref 8.5–9.9)
CHLORIDE BLD-SCNC: 103 MEQ/L (ref 95–107)
CO2: 29 MEQ/L (ref 20–31)
CREAT SERPL-MCNC: 0.81 MG/DL (ref 0.5–0.9)
GFR AFRICAN AMERICAN: >60
GFR NON-AFRICAN AMERICAN: >60
GLUCOSE BLD-MCNC: 116 MG/DL (ref 70–99)
HCT VFR BLD CALC: 35.1 % (ref 37–47)
HEMOGLOBIN: 11.3 G/DL (ref 12–16)
MCH RBC QN AUTO: 28.3 PG (ref 27–31.3)
MCHC RBC AUTO-ENTMCNC: 32.1 % (ref 33–37)
MCV RBC AUTO: 88.2 FL (ref 82–100)
PDW BLD-RTO: 14.2 % (ref 11.5–14.5)
PLATELET # BLD: 311 K/UL (ref 130–400)
POTASSIUM SERPL-SCNC: 4.2 MEQ/L (ref 3.4–4.9)
PRO-BNP: 3265 PG/ML
RBC # BLD: 3.99 M/UL (ref 4.2–5.4)
SODIUM BLD-SCNC: 139 MEQ/L (ref 135–144)
WBC # BLD: 10 K/UL (ref 4.8–10.8)

## 2021-02-15 PROCEDURE — 6370000000 HC RX 637 (ALT 250 FOR IP): Performed by: FAMILY MEDICINE

## 2021-02-15 PROCEDURE — 93010 ELECTROCARDIOGRAM REPORT: CPT | Performed by: INTERNAL MEDICINE

## 2021-02-15 PROCEDURE — 2700000000 HC OXYGEN THERAPY PER DAY

## 2021-02-15 PROCEDURE — 2580000003 HC RX 258: Performed by: FAMILY MEDICINE

## 2021-02-15 PROCEDURE — 93005 ELECTROCARDIOGRAM TRACING: CPT | Performed by: INTERNAL MEDICINE

## 2021-02-15 PROCEDURE — 0W9B3ZZ DRAINAGE OF LEFT PLEURAL CAVITY, PERCUTANEOUS APPROACH: ICD-10-PCS | Performed by: RADIOLOGY

## 2021-02-15 PROCEDURE — 97535 SELF CARE MNGMENT TRAINING: CPT

## 2021-02-15 PROCEDURE — 36415 COLL VENOUS BLD VENIPUNCTURE: CPT

## 2021-02-15 PROCEDURE — 97110 THERAPEUTIC EXERCISES: CPT

## 2021-02-15 PROCEDURE — 99231 SBSQ HOSP IP/OBS SF/LOW 25: CPT | Performed by: INTERNAL MEDICINE

## 2021-02-15 PROCEDURE — 6370000000 HC RX 637 (ALT 250 FOR IP): Performed by: INTERNAL MEDICINE

## 2021-02-15 PROCEDURE — 85027 COMPLETE CBC AUTOMATED: CPT

## 2021-02-15 PROCEDURE — 88112 CYTOPATH CELL ENHANCE TECH: CPT

## 2021-02-15 PROCEDURE — 88305 TISSUE EXAM BY PATHOLOGIST: CPT

## 2021-02-15 PROCEDURE — 2000000000 HC ICU R&B

## 2021-02-15 PROCEDURE — 94640 AIRWAY INHALATION TREATMENT: CPT

## 2021-02-15 PROCEDURE — 83880 ASSAY OF NATRIURETIC PEPTIDE: CPT

## 2021-02-15 PROCEDURE — 94761 N-INVAS EAR/PLS OXIMETRY MLT: CPT

## 2021-02-15 PROCEDURE — 99233 SBSQ HOSP IP/OBS HIGH 50: CPT | Performed by: INTERNAL MEDICINE

## 2021-02-15 PROCEDURE — 80048 BASIC METABOLIC PNL TOTAL CA: CPT

## 2021-02-15 PROCEDURE — 99231 SBSQ HOSP IP/OBS SF/LOW 25: CPT | Performed by: PHYSICAL MEDICINE & REHABILITATION

## 2021-02-15 RX ADMIN — ASPIRIN 81 MG: 81 TABLET, CHEWABLE ORAL at 09:04

## 2021-02-15 RX ADMIN — CITALOPRAM HYDROBROMIDE 20 MG: 20 TABLET ORAL at 09:04

## 2021-02-15 RX ADMIN — PANTOPRAZOLE SODIUM 40 MG: 40 TABLET, DELAYED RELEASE ORAL at 16:49

## 2021-02-15 RX ADMIN — MIDODRINE HYDROCHLORIDE 5 MG: 5 TABLET ORAL at 16:49

## 2021-02-15 RX ADMIN — FUROSEMIDE 20 MG: 20 TABLET ORAL at 09:05

## 2021-02-15 RX ADMIN — LEVOTHYROXINE SODIUM 88 MCG: 0.09 TABLET ORAL at 05:35

## 2021-02-15 RX ADMIN — PANTOPRAZOLE SODIUM 40 MG: 40 TABLET, DELAYED RELEASE ORAL at 05:35

## 2021-02-15 RX ADMIN — CARVEDILOL 3.12 MG: 3.12 TABLET, FILM COATED ORAL at 20:55

## 2021-02-15 RX ADMIN — BUDESONIDE AND FORMOTEROL FUMARATE DIHYDRATE 2 PUFF: 160; 4.5 AEROSOL RESPIRATORY (INHALATION) at 05:30

## 2021-02-15 RX ADMIN — MIDODRINE HYDROCHLORIDE 5 MG: 5 TABLET ORAL at 09:04

## 2021-02-15 RX ADMIN — POTASSIUM CHLORIDE 20 MEQ: 20 TABLET, EXTENDED RELEASE ORAL at 09:04

## 2021-02-15 RX ADMIN — BUDESONIDE AND FORMOTEROL FUMARATE DIHYDRATE 2 PUFF: 160; 4.5 AEROSOL RESPIRATORY (INHALATION) at 16:04

## 2021-02-15 RX ADMIN — MIDODRINE HYDROCHLORIDE 5 MG: 5 TABLET ORAL at 13:07

## 2021-02-15 RX ADMIN — CYANOCOBALAMIN TAB 500 MCG 1000 MCG: 500 TAB at 09:04

## 2021-02-15 RX ADMIN — Medication 10 ML: at 20:56

## 2021-02-15 ASSESSMENT — PAIN SCALES - GENERAL
PAINLEVEL_OUTOF10: 0

## 2021-02-15 NOTE — PROGRESS NOTES
Thoracentesis ordered for tomorrow, daughter at bedside and notified procedure will be done 2/16/2021. States she will let her brother know and he will sign consent tomorrow. andres held for tonight.

## 2021-02-15 NOTE — PROGRESS NOTES
Progress Note  Patient: Kristy Lopez  Unit/Bed: TC03/TC03-01  YOB: 1932  MRN: [id number]  Acct: [de-identified]   Admitting Diagnosis: Acute on chronic combined systolic (congestive) and diastolic (congestive) heart failure ([facility] [I50.43]  Date:  2/9/2021  Hospital Day: 6    Chief Complaint:  Shortness of breath    Subjective  Nonischemic cardiomyopathy generalized weakness off Levophed started on midodrine very slow recovery    Review of Systems:   Review of Systems   Unable to perform ROS: Acuity of condition         Physical Examination:    /83   Pulse 100   Temp 98.2 °F (36.8 °C) (Oral)   Resp 18   Ht 5' 3\" (1.6 m)   Wt 161 lb 13.1 oz (73.4 kg)   SpO2 94%   BMI 28.66 kg/m²    Physical Exam  Constitutional:       Appearance: She is ill-appearing. Cardiovascular:      Rate and Rhythm: Rhythm irregular. Skin:     General: Skin is warm. Coloration: Skin is pale.          LABS:  CBC:   Lab Results   Component Value Date    WBC 10.0 02/15/2021    RBC 3.99 02/15/2021    HGB 11.3 02/15/2021    HCT 35.1 02/15/2021    MCV 88.2 02/15/2021    MCH 28.3 02/15/2021    MCHC 32.1 02/15/2021    RDW 14.2 02/15/2021     02/15/2021     CBC with Differential:   Lab Results   Component Value Date    WBC 10.0 02/15/2021    RBC 3.99 02/15/2021    HGB 11.3 02/15/2021    HCT 35.1 02/15/2021     02/15/2021    MCV 88.2 02/15/2021    MCH 28.3 02/15/2021    MCHC 32.1 02/15/2021    RDW 14.2 02/15/2021    LYMPHOPCT 9.0 01/25/2021    MONOPCT 8.7 01/25/2021    BASOPCT 0.4 01/25/2021    MONOSABS 1.0 01/25/2021    LYMPHSABS 1.0 01/25/2021    EOSABS 0.1 01/25/2021    BASOSABS 0.0 01/25/2021     CMP:    Lab Results   Component Value Date     02/15/2021    K 4.2 02/15/2021    K 4.1 02/13/2021     02/15/2021    CO2 29 02/15/2021    BUN 23 02/15/2021    CREATININE 0.81 02/15/2021    GFRAA >60.0 02/15/2021    LABGLOM >60.0 02/15/2021    GLUCOSE 116 02/15/2021    PROT 6.9 02/13/2021 LABALBU 3.0 02/13/2021    CALCIUM 8.9 02/15/2021    BILITOT 0.5 02/13/2021    ALKPHOS 49 02/13/2021    AST 20 02/13/2021    ALT 21 02/13/2021     BMP:    Lab Results   Component Value Date     02/15/2021    K 4.2 02/15/2021    K 4.1 02/13/2021     02/15/2021    CO2 29 02/15/2021    BUN 23 02/15/2021    LABALBU 3.0 02/13/2021    CREATININE 0.81 02/15/2021    CALCIUM 8.9 02/15/2021    GFRAA >60.0 02/15/2021    LABGLOM >60.0 02/15/2021    GLUCOSE 116 02/15/2021     Magnesium:    Lab Results   Component Value Date    MG 1.7 02/14/2021     Troponin:    Lab Results   Component Value Date    TROPONINI <0.010 02/13/2021       Radiology:  No results found.      EKG:   Assessment:    Active Hospital Problems    Diagnosis Date Noted    Paroxysmal atrial fibrillation [facility]) [I48.0]      Priority: High    Impaired mobility and activities of daily living due to CHF [facility] admit 2021 [Z74.09, Z78.9]      Priority: High    Lumbar stenosis with neurogenic claudication [M48.062] 06/02/2016     Priority: Medium    Acute on chronic combined systolic (congestive) and diastolic (congestive) heart failure (HCC) [I50.43] 02/09/2021    Hypoxia [R09.02]     Osteoarthritis of lumbar spine with myelopathy [M47.16] 06/02/2016           Plan:  Continue with midodrine     electronically signed by Jose Esquivel MD on 2/15/2021 at 3:37 PM

## 2021-02-15 NOTE — PROGRESS NOTES
Pulmonary & Critical Care Medicine ICU Progress Note    Subjective:     No overnight events reported. She currently is resting comfortably in bed. She just Finished working with PT/OT. EXAM:  General: Resting comfortably in bed, no acute distress  HEENT: Normocephalic, atraumatic  Lungs : Decreased breath sounds at the bases otherwise clear to auscultation, no respiratory distress at rest  Heart: Normal rate but irregular rhythm  ABD: Soft, positive bowel sounds, nontender to palpation  Extremities : Warm, dry  Neuro: Alert and awake  Skin: No rashes appreciated    MV Settings:     / / /FiO2 : 100 %     No results for input(s): PHART, UIH1JQG, PO2ART in the last 72 hours.       IV:   DOPamine Stopped (02/13/21 0400)    norepinephrine Stopped (02/15/21 0919)       Vitals:  /83   Pulse 100   Temp 98.2 °F (36.8 °C) (Oral)   Resp 18   Ht 5' 3\" (1.6 m)   Wt 161 lb 13.1 oz (73.4 kg)   SpO2 94%   BMI 28.66 kg/m²          Intake/Output Summary (Last 24 hours) at 2/15/2021 1417  Last data filed at 2/15/2021 0400  Gross per 24 hour   Intake 936 ml   Output 1150 ml   Net -214 ml       Medications:  Scheduled Meds:   midodrine  5 mg Oral TID WC    furosemide  20 mg Oral Daily    citalopram  20 mg Oral Daily    levothyroxine  88 mcg Oral Daily    aspirin  81 mg Oral Daily    vitamin B-12  1,000 mcg Oral Daily    [Held by provider] dilTIAZem  120 mg Oral BID    rivaroxaban  15 mg Oral Daily    budesonide-formoterol  2 puff Inhalation BID    carvedilol  3.125 mg Oral BID    pantoprazole  40 mg Oral BID AC    potassium chloride  20 mEq Oral Daily with breakfast    sodium chloride flush  10 mL Intravenous 2 times per day       Labs:   CBC:   Recent Labs     02/13/21  0506 02/14/21  0530 02/15/21  0451   WBC 13.7* 9.9 10.0   HGB 11.9* 11.7* 11.3*   HCT 35.9* 36.1* 35.1*   MCV 87.5 87.9 88.2    355 311     BMP:   Recent Labs     02/13/21  0506 02/14/21  0530 02/15/21  0451    139 139   K 4.1 3.7 4.2    101 103   CO2 26 27 29   BUN 19 17 23   CREATININE 0.94* 0.89 0.81     LIVER PROFILE:   Recent Labs     02/13/21  0506   AST 20   ALT 21   BILITOT 0.5   ALKPHOS 49     PT/INR: No results for input(s): PROTIME, INR in the last 72 hours. APTT: No results for input(s): APTT in the last 72 hours. UA:No results for input(s): NITRITE, COLORU, PHUR, LABCAST, WBCUA, RBCUA, MUCUS, TRICHOMONAS, YEAST, BACTERIA, CLARITYU, SPECGRAV, LEUKOCYTESUR, UROBILINOGEN, BILIRUBINUR, BLOODU, GLUCOSEU, AMORPHOUS in the last 72 hours. Invalid input(s): KETONESU      Assessment/Plan:    1. Hypoxia-she is currently on nasal cannula oxygen. This should be decreased as able. She does have evidence of bilateral pleural effusions with the left being greater than the right. She is scheduled for a ultrasound-guided thoracentesis tomorrow. Her Xarelto is currently on hold given the thoracentesis. 2. Paroxysmal atrial fibrillation-she does have an elevated rate when she is performing activities or exerting herself. Her heart rate did increase to the 140s when she was working with therapy. Cardiology does continue to follow. Continue with management of her atrial fibrillation per cardiology. 3. Acute on chronic systolic and diastolic heart failure-cardiology has been consulted and continues to follow. 4. Shock-this has resolved at this time. She is no longer requiring any vasopressors. Nutrition: Tolerating oral diet    Prophylaxis: She is currently receiving Protonix for GI prophylaxis. She is on Xarelto and does not require additional DVT prophylaxis.     Code Status: Full    Electronically signed by Samson Kraft DO on 2/15/2021 at 2:17 PM

## 2021-02-15 NOTE — PROGRESS NOTES
stable. Inspection and palpation of digits and nails show no clubbing,       cyanosis or inflammatory conditions. Neuro/Psychiatric: Affect: flat-  Alert and oriented to self and     Situation with  min cues. No significant change in deep tendon reflexes or     sensation  Lungs:  Diminished, CTA-B. Respiration effort is normal at rest.  MOLINA  Heart:   S1 = S2,    irreg . No loud murmurs. Abdomen:  Soft, non-tender, no enlargement of liver or spleen. Extremities:  No significant lower extremity edema or tenderness. Skin:    BUE bruises dt blood draws, no visualized or palpated problems. Rehabilitation:  Physical therapy: FIMS:  Bed Mobility: Scooting: Supervision    Transfers: Sit to Stand: Minimal Assistance(bed height elevated)  Stand to sit: Contact guard assistance  Bed to Chair: Stand by assistance, Ambulation 1  Surface: level tile  Device: Rolling Walker  Other Apparatus: O2  Assistance: Minimal assistance  Quality of Gait: steppage gait pattern  Gait Deviations: Slow Lea, Increased JAMES  Distance: 8 ft X 2  Comments: steady, with turning with 2ww,      FIMS:  ,        Occupational therapy: FIMS:   ,  , Assessment: Pt is an 79 y/o female from home alone who presents to [facility] with the above deficits which impact her independence and safety during ADLs. Pt limited d/t fatigue, weakness and decreased endurance. Pt would benefit from OT  to maximize independence and safety during ADLs.     Speech therapy: FIMS:        Lab/X-ray studies reviewed, analyzed and discussed with patient and staff:   Recent Results (from the past 24 hour(s))   Brain Natriuretic Peptide    Collection Time: 02/15/21  4:51 AM   Result Value Ref Range    Pro-BNP 3,265 pg/mL   Basic Metabolic Panel    Collection Time: 02/15/21  4:51 AM   Result Value Ref Range    Sodium 139 135 - 144 mEq/L    Potassium 4.2 3.4 - 4.9 mEq/L    Chloride 103 95 - 107 mEq/L    CO2 29 20 - 31 mEq/L    Anion Gap 7 (L) 9 - 15 mEq/L    Glucose 116 (H) 70 - 99 mg/dL    BUN 23 8 - 23 mg/dL    CREATININE 0.81 0.50 - 0.90 mg/dL    GFR Non-African American >60.0 >60    GFR  >60.0 >60    Calcium 8.9 8.5 - 9.9 mg/dL   CBC    Collection Time: 02/15/21  4:51 AM   Result Value Ref Range    WBC 10.0 4.8 - 10.8 K/uL    RBC 3.99 (L) 4.20 - 5.40 M/uL    Hemoglobin 11.3 (L) 12.0 - 16.0 g/dL    Hematocrit 35.1 (L) 37.0 - 47.0 %    MCV 88.2 82.0 - 100.0 fL    MCH 28.3 27.0 - 31.3 pg    MCHC 32.1 (L) 33.0 - 37.0 %    RDW 14.2 11.5 - 14.5 %    Platelets 173 676 - 878 K/uL   EKG 12 Lead    Collection Time: 02/15/21  5:41 AM   Result Value Ref Range    Ventricular Rate 70 BPM    Atrial Rate 280 BPM    QRS Duration 150 ms    Q-T Interval 452 ms    QTc Calculation (Bazett) 488 ms    P Axis 143 degrees    R Axis -64 degrees    T Axis -7 degrees       Previous extensive, complex labs, notes and diagnostics reviewed and analyzed. ALLERGIES:    Allergies as of 02/09/2021 - Review Complete 02/09/2021   Allergen Reaction Noted    Latex  07/19/2017    Tape [adhesive tape]  06/28/2016      (please also verify by checking [facility])     Complex Physical Medicine & Rehab Issues Assess & Plan:   1. Severe abnormality of gait and mobility and impaired self-care and ADL's secondary to progressive toxic encephalopathy due to recent CHF and COPD exacerbation. Functional and medical status reassessed regarding patients ability to participate in therapies and patient found to be able to participate in  acute intensive comprehensive inpatient rehabilitation program including PT/OT to improve balance, ambulation, ADLs, and to improve the P/AROM. It is my opinion that they will be able to tolerate 3 hours of therapy a day and benefit from it at an acute level. 2. Bowel constipation and Bladder dysfunction overactive bladder:  frequent toileting, ambulate to bathroom with assistance, check post void residuals.   Check for C.difficile x1 if >2 loose stools in 24 hours, continue bowel & bladder program.  Monitor for UTI symptoms including lethargy and confusion  3. Severe mid and low back pain and generalized OA pain: reassess pain every shift and prior to and after each therapy session, give prn  Tylenol, modalities prn in therapy, consider Lidoderm, K-pad prn.   4. Skin breakdown risk:  continue pressure relief program.  Daily skin exams and reports from nursing. 5. Severe fatigue due to immobility and nutritional deficits: Add vitamin B12 vitamin D and CoQ10 titrate dosing and add protein supplementation with low carb content. 6. Complex discharge planning:   Await medical stability to consider transfer back to acute rehab versus lower level of care if patient is not able to tolerate rehab. However I think because of her medical problems she will need close monitoring from medical standpoint it would like to try to get her back to rehab if we can. Complex Active General Medical Issues that complicate care Assess & Plan:     1. Active Problems:    Impaired mobility and activities of daily living due to CHF exac, [facility] admit 2021    Paroxysmal atrial fibrillation (HCC)    Lumbar stenosis with neurogenic claudication    Osteoarthritis of lumbar spine with myelopathy    Hypoxia    Acute on chronic combined systolic (congestive) and diastolic (congestive) heart failure ([facility]  Resolved Problems:    * No resolved hospital problems.  Kumar Aguirre D.O., PM&R     Attending    [facility] Pamela Cabrera

## 2021-02-15 NOTE — PROGRESS NOTES
02/13/21  0506 02/14/21  0530 02/15/21  0451   WBC 13.7* 9.9 10.0   HGB 11.9* 11.7* 11.3*   HCT 35.9* 36.1* 35.1*    355 311     Recent Labs     02/13/21  0506 02/14/21  0530 02/15/21  0451    139 139   K 4.1 3.7 4.2    101 103   CO2 26 27 29   BUN 19 17 23   CREATININE 0.94* 0.89 0.81   CALCIUM 8.9 8.8 8.9     Recent Labs     02/13/21  0506   AST 20   ALT 21   BILITOT 0.5   ALKPHOS 49     No results for input(s): INR in the last 72 hours. Recent Labs     02/13/21  0506   TROPONINI <0.010       Urinalysis:      Lab Results   Component Value Date    NITRU Negative 02/07/2021    WBCUA 3-5 02/07/2021    BACTERIA Negative 02/07/2021    RBCUA 0-2 02/07/2021    BLOODU TRACE 02/07/2021    SPECGRAV 1.019 02/07/2021    GLUCOSEU Negative 02/07/2021       Radiology:  XR CHEST PORTABLE   Final Result   No significant change                  XR CHEST PORTABLE   Final Result   PULMONARY VASCULAR REMAINS MILDLY CONGESTED. COULD SUGGEST COMPONENT OF UNDERLYING CHF. CORRELATE CLINICALLY   PATCHY TO COALESCENT INFILTRATE WITHIN THE RIGHT LOWER LOBE AS WELL AS AN AREA OF INFILTRATE/CONSOLIDATION LEFT LOWER LOBE WITH TRACE RIGHT PLEURAL EFFUSION AND LEFT PLEURAL EFFUSION. .        US THORACENTESIS Which side should the procedure be performed? Left    (Results Pending)   XR CHEST (2 VW)    (Results Pending)       Assessment/Plan:    Shock- Resolved; on midodrine   Acute on chronic combined systolic and diastolic CHF- cardiology is managing  Pleural effusion- Plan for thoracentesis tomorrow  Paroxsymal A fib w RVR- rate controlled, full anticoag.  Cardiology on case   Hypothyroidism- synthroid  Urinary retention taylor in place   continue acute management in ICU, will follow along with consultants     LEE ANN/Sharri Darby 1106 Problems    Diagnosis Date Noted    Paroxysmal atrial fibrillation [facility]) [I48.0]      Priority: High    Impaired mobility and activities of daily living due to CHF [facility] admit 2021 [Z74.09, Z78.9]      Priority: High    Lumbar stenosis with neurogenic claudication [M48.062] 06/02/2016     Priority: Medium    Acute on chronic combined systolic (congestive) and diastolic (congestive) heart failure (HCC) [I50.43] 02/09/2021    Hypoxia [R09.02]     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 I am managing a portion of pt care. Some medical issues are handled by other specialists. Additional work up and treatment should be done in out pt setting by pt PCP and other out pt providers. In addition to examining and evaluating pt, I spent additional time explaining care, normal and abnormal findings, and treatment plan. All of pt questions were answered. Counseling, diet and education were  provided. Case will be discussed with nursing staff when appropriate. Family will be updated if and when appropriate.       Diet: DIET LOW SODIUM 2 GM;    Code Status: Full Code    PT/OT Eval   Electronically signed by Alize Taylor MD on 2/15/2021 at 4:11 PM

## 2021-02-15 NOTE — PROGRESS NOTES
Pt resting at this time, heart rate SR and blood pressure 90/63 map 68. But when pt awake and assisted up  's A-fib and blood pressure 119/80. Pt asymptomatic with HR. Dr Aleksey Barber notified and updated on rounds.

## 2021-02-15 NOTE — PROGRESS NOTES
Daughter at bedside, mother signed consent and verbally stated she understood because she had it before.

## 2021-02-15 NOTE — PROGRESS NOTES
Physical Therapy Med Surg Daily Treatment Note  Facility/Department: [facility] TRAUMA CARE UNIT  Room: 03/[place] [address]       NAME: Camila Bansal  : 1932 (80 y.o.)  MRN: [id number]  CODE STATUS: Full Code    Date of Service: 2/15/2021    Patient Diagnosis(es): Acute on chronic combined systolic (congestive) and diastolic (congestive) heart failure ([address] [I50.43]   No chief complaint on file.     Patient Active Problem List    Diagnosis Date Noted    Paroxysmal atrial fibrillation [facility])      Priority: High    Impaired mobility and activities of daily living due to CHF exac, [id number] [facility] admit       Priority: High    Lumbar stenosis with neurogenic claudication 2016     Priority: Medium    Acute on chronic combined systolic (congestive) and diastolic (congestive) heart failure (HCC) 2021    Pleural effusion 2021    Hypoxia     Acute pulmonary edema (HCC)     Gait abnormality 2021    Acute on chronic combined systolic and diastolic CHF (congestive heart failure) ([address] 2021    Essential hypertension 2018    Shortness of breath     Dizziness     Ascending aortic aneurysm (HCC) 2017    Descending aortic aneurysm ([address] 2017    Osteoarthritis     Myelopathy ([address]     Headache     Depression     Acquired scoliosis 2016    Osteoporosis 2016    Charcot Arleen Tooth muscular atrophy 2016    Excess weight 2016    Osteoarthritis of lumbar spine with myelopathy 2016        Past Medical History:   Diagnosis Date    Ascending aortic aneurysm ([address] 2017    Charcot Arleen Tooth muscular atrophy     Depression     Descending aortic aneurysm ([address] 2017    Essential hypertension 2018    Headache     HTN (hypertension)     Impaired mobility and activities of daily living     Lumbar stenosis with neurogenic claudication     Myelopathy ([address]     Osteoarthritis      Past Surgical History:   Procedure Laterality continues with high motivation to complete out of bed activity. Pt demonstrating improved seated edge of bed balance- no LOB without UE support during LE exercises. Pt has not reached PLOF and requires continued PT.      REQUIRES PT FOLLOW UP: Yes     Discharge Recommendations:  Continue to assess pending progress, Patient would benefit from continued therapy after discharge    Goals  Long term goals  Long term goal 1: Pt will perform bed mobility with indep  Long term goal 2: Pt will perform functional transfers with indep  Long term goal 3: Pt will ambulate >/=50ft with 2ww and mod I  Long term goal 4: Pt will negotiate 4 steps with handrail and SBA    PLAN    Times per week: 3-6  Safety Devices  Type of devices: Nurse notified, Bed alarm in place, Call light within reach, Left in bed     [facility] (6 CLICK) [address] Mobility Raw Score : 13    Therapy Time   Individual   Time In 1404   Time Out 1428   Minutes 24      Transfers: 14  Therex: [id number] Dejuan Bustos, PT, 02/15/21 at 2:51 PM         Definitions for assistance levels  Independent = pt does not require any physical supervision or assistance from another person for activity completion. Device may be needed.   Stand by assistance = pt requires verbal cues or instructions from another person, close to but not touching, to perform the activity  Minimal assistance= pt performs 75% or more of the activity; assistance is required to complete the activity  Moderate assistance= pt performs 50% of the activity; assistance is required to complete the activity  Maximal assistance = pt performs 25% of the activity; assistance is required to complete the activity  Dependent = pt requires total physical assistance to accomplish the task

## 2021-02-15 NOTE — PROGRESS NOTES
Called specials for ? ? Thoracentesis. Pt had one on the 29th of January but was a pt on rehab then. No scheduled times noted in specials or ultrasound at this time.

## 2021-02-16 ENCOUNTER — APPOINTMENT (OUTPATIENT)
Dept: GENERAL RADIOLOGY | Age: 86
DRG: 291 | End: 2021-02-16
Attending: FAMILY MEDICINE
Payer: MEDICARE

## 2021-02-16 ENCOUNTER — APPOINTMENT (OUTPATIENT)
Dept: INTERVENTIONAL RADIOLOGY/VASCULAR | Age: 86
DRG: 291 | End: 2021-02-16
Attending: FAMILY MEDICINE
Payer: MEDICARE

## 2021-02-16 LAB
ALBUMIN SERPL-MCNC: 2.6 G/DL (ref 3.5–4.6)
ALP BLD-CCNC: 48 U/L (ref 40–130)
ALT SERPL-CCNC: 14 U/L (ref 0–33)
ANION GAP SERPL CALCULATED.3IONS-SCNC: 9 MEQ/L (ref 9–15)
APPEARANCE FLUID: CLEAR
AST SERPL-CCNC: 16 U/L (ref 0–35)
BILIRUB SERPL-MCNC: 0.3 MG/DL (ref 0.2–0.7)
BILIRUBIN DIRECT: <0.2 MG/DL (ref 0–0.4)
BILIRUBIN, INDIRECT: ABNORMAL MG/DL (ref 0–0.6)
BUN BLDV-MCNC: 20 MG/DL (ref 8–23)
CALCIUM SERPL-MCNC: 8.8 MG/DL (ref 8.5–9.9)
CELL COUNT FLUID TYPE: NORMAL
CHLORIDE BLD-SCNC: 103 MEQ/L (ref 95–107)
CLOT EVALUATION: NORMAL
CO2: 27 MEQ/L (ref 20–31)
COLOR FLUID: NORMAL
CREAT SERPL-MCNC: 0.69 MG/DL (ref 0.5–0.9)
EOSINOPHIL FLUID: 8 %
FLUID TYPE: NORMAL
GFR AFRICAN AMERICAN: >60
GFR NON-AFRICAN AMERICAN: >60
GLUCOSE BLD-MCNC: 99 MG/DL (ref 70–99)
GLUCOSE, FLUID: 130 MG/DL
HCT VFR BLD CALC: 32.9 % (ref 37–47)
HEMOGLOBIN: 10.8 G/DL (ref 12–16)
LACTATE DEHYDROGENASE: 178 U/L (ref 135–214)
LD, FLUID: 136 U/L
LYMPHOCYTES, BODY FLUID: 55 %
MACROPHAGE FLUID: 31 %
MCH RBC QN AUTO: 28.9 PG (ref 27–31.3)
MCHC RBC AUTO-ENTMCNC: 32.8 % (ref 33–37)
MCV RBC AUTO: 88.3 FL (ref 82–100)
NEUTROPHIL, FLUID: 6 %
NUCLEATED CELLS FLUID: 3151 /CUMM
NUMBER OF CELLS COUNTED FLUID: 100
PDW BLD-RTO: 14.2 % (ref 11.5–14.5)
PLATELET # BLD: 287 K/UL (ref 130–400)
POTASSIUM SERPL-SCNC: 4.3 MEQ/L (ref 3.4–4.9)
PROTEIN FLUID: 4.2 G/DL
RBC # BLD: 3.73 M/UL (ref 4.2–5.4)
RBC FLUID: NORMAL /CUMM
SODIUM BLD-SCNC: 139 MEQ/L (ref 135–144)
TOTAL PROTEIN: 6.2 G/DL (ref 6.3–8)
WBC # BLD: 8.6 K/UL (ref 4.8–10.8)

## 2021-02-16 PROCEDURE — 6370000000 HC RX 637 (ALT 250 FOR IP): Performed by: INTERNAL MEDICINE

## 2021-02-16 PROCEDURE — 99231 SBSQ HOSP IP/OBS SF/LOW 25: CPT | Performed by: UROLOGY

## 2021-02-16 PROCEDURE — 85027 COMPLETE CBC AUTOMATED: CPT

## 2021-02-16 PROCEDURE — 32555 ASPIRATE PLEURA W/ IMAGING: CPT | Performed by: RADIOLOGY

## 2021-02-16 PROCEDURE — 83615 LACTATE (LD) (LDH) ENZYME: CPT

## 2021-02-16 PROCEDURE — 6370000000 HC RX 637 (ALT 250 FOR IP): Performed by: FAMILY MEDICINE

## 2021-02-16 PROCEDURE — 2500000003 HC RX 250 WO HCPCS: Performed by: RADIOLOGY

## 2021-02-16 PROCEDURE — 99232 SBSQ HOSP IP/OBS MODERATE 35: CPT | Performed by: INTERNAL MEDICINE

## 2021-02-16 PROCEDURE — 99222 1ST HOSP IP/OBS MODERATE 55: CPT | Performed by: RADIOLOGY

## 2021-02-16 PROCEDURE — 2700000000 HC OXYGEN THERAPY PER DAY

## 2021-02-16 PROCEDURE — 99231 SBSQ HOSP IP/OBS SF/LOW 25: CPT | Performed by: PHYSICAL MEDICINE & REHABILITATION

## 2021-02-16 PROCEDURE — 71045 X-RAY EXAM CHEST 1 VIEW: CPT

## 2021-02-16 PROCEDURE — 82945 GLUCOSE OTHER FLUID: CPT

## 2021-02-16 PROCEDURE — 87070 CULTURE OTHR SPECIMN AEROBIC: CPT

## 2021-02-16 PROCEDURE — 71045 X-RAY EXAM CHEST 1 VIEW: CPT | Performed by: RADIOLOGY

## 2021-02-16 PROCEDURE — 2060000000 HC ICU INTERMEDIATE R&B

## 2021-02-16 PROCEDURE — 93005 ELECTROCARDIOGRAM TRACING: CPT | Performed by: INTERNAL MEDICINE

## 2021-02-16 PROCEDURE — 87205 SMEAR GRAM STAIN: CPT

## 2021-02-16 PROCEDURE — 89051 BODY FLUID CELL COUNT: CPT

## 2021-02-16 PROCEDURE — 2709999900 IR GUIDED THORACENTESIS PLEURAL

## 2021-02-16 PROCEDURE — 80048 BASIC METABOLIC PNL TOTAL CA: CPT

## 2021-02-16 PROCEDURE — 36415 COLL VENOUS BLD VENIPUNCTURE: CPT

## 2021-02-16 PROCEDURE — 6360000002 HC RX W HCPCS: Performed by: INTERNAL MEDICINE

## 2021-02-16 PROCEDURE — 94761 N-INVAS EAR/PLS OXIMETRY MLT: CPT

## 2021-02-16 PROCEDURE — 2580000003 HC RX 258: Performed by: FAMILY MEDICINE

## 2021-02-16 PROCEDURE — 93010 ELECTROCARDIOGRAM REPORT: CPT | Performed by: INTERNAL MEDICINE

## 2021-02-16 PROCEDURE — 97116 GAIT TRAINING THERAPY: CPT

## 2021-02-16 PROCEDURE — 94640 AIRWAY INHALATION TREATMENT: CPT

## 2021-02-16 PROCEDURE — 80076 HEPATIC FUNCTION PANEL: CPT

## 2021-02-16 PROCEDURE — 84157 ASSAY OF PROTEIN OTHER: CPT

## 2021-02-16 RX ORDER — LIDOCAINE HYDROCHLORIDE 20 MG/ML
INJECTION, SOLUTION INFILTRATION; PERINEURAL
Status: COMPLETED | OUTPATIENT
Start: 2021-02-16 | End: 2021-02-16

## 2021-02-16 RX ORDER — DIGOXIN 0.25 MG/ML
500 INJECTION INTRAMUSCULAR; INTRAVENOUS ONCE
Status: COMPLETED | OUTPATIENT
Start: 2021-02-16 | End: 2021-02-16

## 2021-02-16 RX ORDER — DIGOXIN 125 MCG
125 TABLET ORAL DAILY
Status: DISCONTINUED | OUTPATIENT
Start: 2021-02-17 | End: 2021-02-19 | Stop reason: HOSPADM

## 2021-02-16 RX ADMIN — CYANOCOBALAMIN TAB 500 MCG 1000 MCG: 500 TAB at 08:37

## 2021-02-16 RX ADMIN — BUDESONIDE AND FORMOTEROL FUMARATE DIHYDRATE 2 PUFF: 160; 4.5 AEROSOL RESPIRATORY (INHALATION) at 05:14

## 2021-02-16 RX ADMIN — MIDODRINE HYDROCHLORIDE 5 MG: 5 TABLET ORAL at 12:09

## 2021-02-16 RX ADMIN — ACETAMINOPHEN 650 MG: 325 TABLET ORAL at 21:37

## 2021-02-16 RX ADMIN — MIDODRINE HYDROCHLORIDE 5 MG: 5 TABLET ORAL at 16:48

## 2021-02-16 RX ADMIN — LIDOCAINE HYDROCHLORIDE 8 ML: 20 INJECTION, SOLUTION INFILTRATION; PERINEURAL at 09:54

## 2021-02-16 RX ADMIN — PANTOPRAZOLE SODIUM 40 MG: 40 TABLET, DELAYED RELEASE ORAL at 05:53

## 2021-02-16 RX ADMIN — MIDODRINE HYDROCHLORIDE 5 MG: 5 TABLET ORAL at 08:38

## 2021-02-16 RX ADMIN — ASPIRIN 81 MG: 81 TABLET, CHEWABLE ORAL at 08:38

## 2021-02-16 RX ADMIN — CARVEDILOL 3.12 MG: 3.12 TABLET, FILM COATED ORAL at 08:38

## 2021-02-16 RX ADMIN — PANTOPRAZOLE SODIUM 40 MG: 40 TABLET, DELAYED RELEASE ORAL at 16:48

## 2021-02-16 RX ADMIN — DIGOXIN 500 MCG: 250 INJECTION, SOLUTION INTRAMUSCULAR; INTRAVENOUS; PARENTERAL at 21:37

## 2021-02-16 RX ADMIN — POTASSIUM CHLORIDE 20 MEQ: 20 TABLET, EXTENDED RELEASE ORAL at 08:38

## 2021-02-16 RX ADMIN — Medication 10 ML: at 21:38

## 2021-02-16 RX ADMIN — LEVOTHYROXINE SODIUM 88 MCG: 0.09 TABLET ORAL at 05:53

## 2021-02-16 RX ADMIN — CITALOPRAM HYDROBROMIDE 20 MG: 20 TABLET ORAL at 08:38

## 2021-02-16 RX ADMIN — RIVAROXABAN 15 MG: 15 TABLET, FILM COATED ORAL at 16:48

## 2021-02-16 ASSESSMENT — PAIN SCALES - GENERAL
PAINLEVEL_OUTOF10: 6
PAINLEVEL_OUTOF10: 0
PAINLEVEL_OUTOF10: 0

## 2021-02-16 NOTE — PROGRESS NOTES
Hospitalist Progress Note      PCP: Amanda Issa MD    Date of Admission: 2/9/2021    Chief Complaint:    No chief complaint on file. Subjective:  Patient denies CP, breathing ok, denies fevers; has pain with very deep breaths. 12 point ROS negative other than mentioned above     Medications:  Reviewed    Infusion Medications     Scheduled Medications    midodrine  5 mg Oral TID WC    furosemide  20 mg Oral Daily    citalopram  20 mg Oral Daily    levothyroxine  88 mcg Oral Daily    aspirin  81 mg Oral Daily    vitamin B-12  1,000 mcg Oral Daily    [Held by provider] dilTIAZem  120 mg Oral BID    rivaroxaban  15 mg Oral Daily    budesonide-formoterol  2 puff Inhalation BID    carvedilol  3.125 mg Oral BID    pantoprazole  40 mg Oral BID AC    potassium chloride  20 mEq Oral Daily with breakfast    sodium chloride flush  10 mL Intravenous 2 times per day     PRN Meds: LORazepam, nitroGLYCERIN, simethicone, sodium chloride flush, promethazine **OR** ondansetron, polyethylene glycol, acetaminophen **OR** acetaminophen      Intake/Output Summary (Last 24 hours) at 2/16/2021 1550  Last data filed at 2/16/2021 1352  Gross per 24 hour   Intake 600 ml   Output 1050 ml   Net -450 ml     Exam:    BP 97/67   Pulse 135   Temp 98 °F (36.7 °C) (Oral)   Resp 18   Ht 5' 3\" (1.6 m)   Wt 161 lb 13.1 oz (73.4 kg)   SpO2 95%   BMI 28.66 kg/m²     General appearance: No apparent distress, appears stated age and cooperative. HEENT:  Conjunctivae/corneas clear. Neck:  Trachea midline. Respiratory:  Normal respiratory effort. Clear to auscultation  Cardiovascular: Irregularly irregular  Abdomen: Soft, non-tender, non-distended with normal bowel sounds. Musculoskeletal: No clubbing, cyanosis or edema bilaterally.   Neuro: Non Focal.   Capillary Refill: Brisk,< 3 seconds   Peripheral Pulses: +2 palpable, equal bilaterally     Labs:   Recent Labs     02/14/21  0530 02/15/21  0451 02/16/21  0453   WBC 9.9 10.0 8.6 LOWER LOBE AS WELL AS AN AREA OF INFILTRATE/CONSOLIDATION LEFT LOWER LOBE WITH TRACE RIGHT PLEURAL EFFUSION AND LEFT PLEURAL EFFUSION. .        XR CHEST (2 VW)    (Results Pending)   IR GUIDED THORACENTESIS PLEURAL    (Results Pending)     Assessment/Plan:    Shock- Resolved; on midodrine   Acute on chronic combined systolic and diastolic CHF- cardiology is managing; pressure still borderline  Pleural effusion- s/p thoracentesis; added on LFT and LDH to morning labs to evaluate lights criteria; likely due to the CHF  Paroxsymal A fib w RVR- Currently in RVR; discussed with RN who will notify cardiology for management  Hypothyroidism- synthroid  Urinary retention - taylor in place; voiding trial tomorrow    Active Hospital Problems    Diagnosis Date Noted    Paroxysmal atrial fibrillation ([facility] [address] [I48.0]      Priority: High    Impaired mobility and activities of daily living due to CHF [facility] admit 2021 [Z74.09, Z78.9]      Priority: High    Lumbar stenosis with neurogenic claudication [M48.062] 06/02/2016     Priority: Medium    Acute on chronic combined systolic (congestive) and diastolic (congestive) heart failure (HCC) [I50.43] 02/09/2021    Hypoxia [R09.02]     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 I am managing a portion of pt care. Some medical issues are handled by other specialists. Additional work up and treatment should be done in out pt setting by pt PCP and other out pt providers. In addition to examining and evaluating pt, I spent additional time explaining care, normal and abnormal findings, and treatment plan. All of pt questions were answered. Counseling, diet and education were  provided. Case will be discussed with nursing staff when appropriate. Family will be updated if and when appropriate.       Diet: DIET LOW SODIUM 2 GM;    Code Status: Full Code    PT/OT Eval   Electronically signed by Kris Nguyen MD on 2/16/2021 at 3:50 PM

## 2021-02-16 NOTE — PROGRESS NOTES
Physical Therapy Med Surg Daily Treatment Note  Facility/Department: [facility]  Room: R6/J494-73       NAME: Onel Ott  : 1932 (80 y.o.)  MRN: [id number]  CODE STATUS: Full Code    Date of Service: 2021    Patient Diagnosis(es): Acute on chronic combined systolic (congestive) and diastolic (congestive) heart failure ([address] [I50.43]   No chief complaint on file.     Patient Active Problem List    Diagnosis Date Noted    Paroxysmal atrial fibrillation [facility])      Priority: High    Impaired mobility and activities of daily living due to CHF [facility] admit       Priority: High    Lumbar stenosis with neurogenic claudication 2016     Priority: Medium    Acute on chronic combined systolic (congestive) and diastolic (congestive) heart failure (HCC) 2021    Pleural effusion 2021    Hypoxia     Acute pulmonary edema (HCC)     Gait abnormality 2021    Acute on chronic combined systolic and diastolic CHF (congestive heart failure) ([address] 2021    Essential hypertension 2018    Shortness of breath     Dizziness     Ascending aortic aneurysm (HCC) 2017    Descending aortic aneurysm ([address] 2017    Osteoarthritis     Myelopathy ([address]     Headache     Depression     Acquired scoliosis 2016    Osteoporosis 2016    Charcot Arleen Tooth muscular atrophy 2016    Excess weight 2016    Osteoarthritis of lumbar spine with myelopathy 2016        Past Medical History:   Diagnosis Date    Ascending aortic aneurysm ([address] 2017    Charcot Arleen Tooth muscular atrophy     Depression     Descending aortic aneurysm ([address] 2017    Essential hypertension 2018    Headache     HTN (hypertension)     Impaired mobility and activities of daily living     Lumbar stenosis with neurogenic claudication     Myelopathy ([address]     Osteoarthritis      Past Surgical History:   Procedure Laterality Date  JOINT REPLACEMENT      THORACENTESIS Left 01/29/2021    total of 755 cc removed per Dr Jenene Councilman specimen sent to lab       Restrictions  Restrictions/Precautions: Fall Risk(high fall risk per clinical judgement- due to impulsive and low BP)    SUBJECTIVE   General  Chart Reviewed: Yes  Family / Caregiver Present: No  General Comment  Comments: Pt agreeable, nursing cleared pt    Pre-Session Pain Report     Pain Screening  Patient Currently in Pain: No       Post-Session Pain Report: 2-3/10, VIPUL'S     OBJECTIVE        Bed mobility  Rolling to Right: Stand by assistance  Supine to Sit: Contact guard assistance  Sit to Supine: Contact guard assistance  Scooting: Supervision  Comment: HOB slightly elevated, pt utilized BR    Transfers  Sit to Stand: Contact guard assistance;Minimal Assistance  Stand to sit: Contact guard assistance  Comment: Pt with improved safety awareness with WW this date    Ambulation  Ambulation?: Yes  Ambulation 1  Surface: level tile  Device: Rolling Walker  Other Apparatus: O2  Assistance: Contact guard assistance;Minimal assistance  Quality of Gait: steppage gait pattern  Gait Deviations: Slow Lea; Increased JAMES; Decreased step length;Decreased step height  Distance: 10ft x 2        ASSESSMENT   Body structures, Functions, Activity limitations: Decreased functional mobility ; Decreased safe awareness;Decreased balance;Decreased ADL status; Decreased endurance;Decreased strength;Decreased posture  Assessment: Pt with low BP per nursging after thoracentisis, however cleared pt to participate if not dizzy. Pt SPO2 96% before gait, no SOB or MOLINA noted. Pt demo'd improved safety with WW this date.   REQUIRES PT FOLLOW UP: Yes     Discharge Recommendations:  Continue to assess pending progress    Goals  Long term goals  Long term goal 1: Pt will perform bed mobility with indep  Long term goal 2: Pt will perform functional transfers with indep  Long term goal 3: Pt will ambulate >/=50ft with 2ww and mod I  Long term goal 4: Pt will negotiate 4 steps with handrail and SBA    PLAN    Times per week: 3-6  Safety Devices  Type of devices: Nurse notified, Bed alarm in place, Call light within reach, Left in bed     [facility] (6 CLICK) [address] Inpatient Mobility Raw Score : 16     Therapy Time   Individual   Time In 1440   Time Out 1455   Minutes 15     Timed Code Treatment Minutes: [address] [facility], 02/16/21 at 2:57 PM       Activity Minutes Units   Transfers/BM 5 1   Gait  10 1         Definitions for assistance levels  Independent = pt does not require any physical supervision or assistance from another person for activity completion. Device may be needed.   Stand by assistance = pt requires verbal cues or instructions from another person, close to but not touching, to perform the activity  Minimal assistance= pt performs 75% or more of the activity; assistance is required to complete the activity  Moderate assistance= pt performs 50% of the activity; assistance is required to complete the activity  Maximal assistance = pt performs 25% of the activity; assistance is required to complete the activity  Dependent = pt requires total physical assistance to accomplish the task

## 2021-02-16 NOTE — PROGRESS NOTES
Subjective: The patient complains of severe acute on chronic progressive fatigue and pain on exertion shortness of breath partially relieved by rest, PT, OT and meds and exacerbated by exertion and recent illness. I am concerned about patients medical complexities. He is to transition out of the trauma ICU today status posterior ultrasound-guided thoracentesis    Reviewed recent nursing note, \" Ultrasound Guided Thoracentesis  VSS. Charlettemarcel Brown Patient sitting on cart side leaning over tray table. Posterior lung fields scanned with ultrasound by Ultrasound technician. Images reviewed by performing Radiologist. ...1216 Procedure explained, consent signed. Time out completed for ultrasound guided thoracentesis. Site marked by Dr. Lord Benitez, then procedure site prepped with chloraprep, and draped with sterile drape and towels. ... Left Posterior chest site then numbed with lidocaine 2% by Dr Po Gurrola Centesis 5F catheter placed into pleural space using US guidance. Fluid draining appears clear red. Charlette Brown Sample of fluid obtained and placed into specimen containers to be sent for testing as ordered. ...3301 Catheter attached to [facility] machine to remove fluid.     1004 thoracentesis complete. Total 730 ml removed. Catheter removed. Bandaid applied. VSS. Pt tolerated well. Verbal and tactile reassurance given throughout.    0359 Patient taken for CXR and specimen fluid taken to lab. Report given to THADDEUS SOLOMON [facility] and pt taken back to TCU by radiology RNs. \".    ROS x10: The patient also complains of severely impaired mobility and activities of daily living. Otherwise no new problems with vision, hearing, nose, mouth, throat, dermal, cardiovascular, GI, , pulmonary, musculoskeletal, psychiatric or neurological. See Rehab consult on Rehab chart .        Vital signs:  BP 81/61   Pulse 146   Temp 98.2 °F (36.8 °C) (Oral)   Resp 22   Ht 5' 3\" (1.6 m)   Wt 161 lb 13.1 oz (73.4 kg)   SpO2 96%   BMI 28.66 kg/m² I/O:   PO/Intake:     No problems with PO intake, low-sodium no milk. Bowel/Bladder:   continent,    General:  Patient is well developed, adequately nourished, non-obese and     well kempt. HEENT:    PERRLA, hearing intact to loud voice, external inspection of ear     and nose benign. Inspection of lips, tongue and gums benign  Musculoskeletal: No significant change in strength or tone. All joints stable. Inspection and palpation of digits and nails show no clubbing,       cyanosis or inflammatory conditions. Neuro/Psychiatric: Affect: flat-  Alert and oriented to self and     Situation with  min cues. No significant change in deep tendon reflexes or     sensation  Lungs:  Diminished, CTA-B. Respiration effort is normal at rest.  MOLINA  Heart:   S1 = S2,    irreg . No loud murmurs. Abdomen:  Soft, non-tender, no enlargement of liver or spleen. Extremities:  No significant lower extremity edema or tenderness. Skin:    BUE bruises dt blood draws, no visualized or palpated problems. Rehabilitation:  Physical therapy: FIMS:  Bed Mobility: Scooting: Supervision    Transfers: Sit to Stand: Moderate Assistance  Stand to sit: Minimal Assistance  Bed to Chair: Stand by assistance, Ambulation 1  Surface: level tile  Device: No Device, Hand-Held Assist  Other Apparatus: O2  Assistance: Minimal assistance  Quality of Gait: steppage gait pattern  Gait Deviations: Slow Lea, Increased JAMES, Decreased step length, Decreased step height  Distance: 5 ft X 2 to commode  Comments: steady, with turning with 2ww,      FIMS:  ,        Occupational therapy: FIMS:   ,  , Assessment: Pt is an 79 y/o female from home alone who presents to [facility] with the above deficits which impact her independence and safety during ADLs. Pt limited d/t fatigue, weakness and decreased endurance. Pt would benefit from OT  to maximize independence and safety during ADLs.     Speech therapy: FIMS:        Lab/X-ray studies reviewed, analyzed and discussed with patient and staff:   Recent Results (from the past 24 hour(s))   Basic Metabolic Panel    Collection Time: 02/16/21  4:53 AM   Result Value Ref Range    Sodium 139 135 - 144 mEq/L    Potassium 4.3 3.4 - 4.9 mEq/L    Chloride 103 95 - 107 mEq/L    CO2 27 20 - 31 mEq/L    Anion Gap 9 9 - 15 mEq/L    Glucose 99 70 - 99 mg/dL    BUN 20 8 - 23 mg/dL    CREATININE 0.69 0.50 - 0.90 mg/dL    GFR Non-African American >60.0 >60    GFR  >60.0 >60    Calcium 8.8 8.5 - 9.9 mg/dL   CBC    Collection Time: 02/16/21  4:53 AM   Result Value Ref Range    WBC 8.6 4.8 - 10.8 K/uL    RBC 3.73 (L) 4.20 - 5.40 M/uL    Hemoglobin 10.8 (L) 12.0 - 16.0 g/dL    Hematocrit 32.9 (L) 37.0 - 47.0 %    MCV 88.3 82.0 - 100.0 fL    MCH 28.9 27.0 - 31.3 pg    MCHC 32.8 (L) 33.0 - 37.0 %    RDW 14.2 11.5 - 14.5 %    Platelets 836 354 - 227 K/uL   EKG 12 Lead    Collection Time: 02/16/21  5:08 AM   Result Value Ref Range    Ventricular Rate 115 BPM    Atrial Rate 288 BPM    QRS Duration 134 ms    Q-T Interval 284 ms    QTc Calculation (Bazett) 392 ms    R Axis -67 degrees    T Axis 187 degrees       Previous extensive, complex labs, notes and diagnostics reviewed and analyzed. ALLERGIES:    Allergies as of 02/09/2021 - Review Complete 02/09/2021   Allergen Reaction Noted    Latex  07/19/2017    Tape [adhesive tape]  06/28/2016      (please also verify by checking [facility])     Complex Physical Medicine & Rehab Issues Assess & Plan:   1. Severe abnormality of gait and mobility and impaired self-care and ADL's secondary to progressive toxic encephalopathy due to recent CHF and COPD exacerbation. Functional and medical status reassessed regarding patients ability to participate in therapies and patient found to be able to participate in  acute intensive comprehensive inpatient rehabilitation program including PT/OT to improve balance, ambulation, ADLs, and to improve the P/AROM.    It is my opinion that they will be able to tolerate 3 hours of therapy a day and benefit from it at an acute level. 2. Bowel constipation and Bladder dysfunction overactive bladder:  frequent toileting, ambulate to bathroom with assistance, check post void residuals. Check for C.difficile x1 if >2 loose stools in 24 hours, continue bowel & bladder program.  Monitor for UTI symptoms including lethargy and confusion  3. Severe mid and low back pain and generalized OA pain: reassess pain every shift and prior to and after each therapy session, give prn  Tylenol, modalities prn in therapy, consider Lidoderm, K-pad prn.   4. Skin breakdown risk:  continue pressure relief program.  Daily skin exams and reports from nursing. 5. Severe fatigue due to immobility and nutritional deficits: Add vitamin B12 vitamin D and CoQ10 titrate dosing and add protein supplementation with low carb content. 6. Complex discharge planning:   Await medical stability to consider transfer back to acute rehab versus lower level of care if patient is not able to tolerate rehab. However I think because of her medical problems she will need close monitoring from medical standpoint it would like to try to get her back to rehab if we can. She was scheduled to go to the acute medical floor status post pleurocentesis. Complex Active General Medical Issues that complicate care Assess & Plan:     1. Active Problems:    Impaired mobility and activities of daily living due to CHF exac, [facility] admit 2021    Paroxysmal atrial fibrillation (HCC)    Lumbar stenosis with neurogenic claudication    Osteoarthritis of lumbar spine with myelopathy    Hypoxia    Acute on chronic combined systolic (congestive) and diastolic (congestive) heart failure ([address]  Resolved Problems:    * No resolved hospital problems.  Kelvin Manning D.O., PM&R     Attending    [address]

## 2021-02-16 NOTE — PROGRESS NOTES
Pulmonary & Critical Care Medicine ICU Progress Note    Subjective:     No overnight events reported. She currently is resting comfortably in bed. She did undergo thoracentesis today by interventional radiology. EXAM:  General: Resting comfortably in bed, no acute distress  HEENT: Normocephalic, atraumatic  Lungs : No acute distress, clear to auscultation bilaterally, no wheezes  Heart: Tachycardic rate and irregular rhythm  ABD: Soft, positive bowel sounds, nontender to palpation  Extremities : Warm, dry  Neuro: Alert and awake  Skin: No rashes appreciated     No results for input(s): PHART, JQH0HHY, PO2ART in the last 72 hours.       IV:   DOPamine Stopped (02/13/21 0400)    norepinephrine Stopped (02/15/21 0919)       Vitals:  BP 83/68   Pulse 127   Temp 98.2 °F (36.8 °C) (Oral)   Resp 25   Ht 5' 3\" (1.6 m)   Wt 161 lb 13.1 oz (73.4 kg)   SpO2 96%   BMI 28.66 kg/m²          Intake/Output Summary (Last 24 hours) at 2/16/2021 1127  Last data filed at 2/16/2021 0533  Gross per 24 hour   Intake 360 ml   Output 1050 ml   Net -690 ml       Medications:  Scheduled Meds:   midodrine  5 mg Oral TID WC    furosemide  20 mg Oral Daily    citalopram  20 mg Oral Daily    levothyroxine  88 mcg Oral Daily    aspirin  81 mg Oral Daily    vitamin B-12  1,000 mcg Oral Daily    [Held by provider] dilTIAZem  120 mg Oral BID    rivaroxaban  15 mg Oral Daily    budesonide-formoterol  2 puff Inhalation BID    carvedilol  3.125 mg Oral BID    pantoprazole  40 mg Oral BID AC    potassium chloride  20 mEq Oral Daily with breakfast    sodium chloride flush  10 mL Intravenous 2 times per day       Labs:   CBC:   Recent Labs     02/14/21  0530 02/15/21  0451 02/16/21  0453   WBC 9.9 10.0 8.6   HGB 11.7* 11.3* 10.8*   HCT 36.1* 35.1* 32.9*   MCV 87.9 88.2 88.3    311 287     BMP:   Recent Labs     02/14/21  0530 02/15/21  0451 02/16/21  0453    139 139   K 3.7 4.2 4.3    103 103   CO2 27 29 27

## 2021-02-16 NOTE — PROGRESS NOTES
PHARMACY NOTE:   Interdisciplinary Rounds Completed     ICU Day #2     Changes made today by Pharmacy:   No changes made to medications by pharmacy today during interdisciplinary care rounds      Additional information:   Norepinephrine and dopamine titrated off - recs to DC drips prior to patient going to floor after thoracentesis   Core measures assessed/met     Marty Peraza PharmD   2/16/2021 11:04 AM

## 2021-02-16 NOTE — SEDATION DOCUMENTATION
NO SEDATION      Ultrasound Guided Thoracentesis  VSS. Patient sitting on cart side leaning over tray table. Posterior lung fields scanned with ultrasound by Ultrasound technician. Images reviewed by performing Radiologist.     4063 Procedure explained, consent signed. Time out completed for ultrasound guided thoracentesis. Site marked by Dr. Jacqui Jimenez, then procedure site prepped with chloraprep, and draped with sterile drape and towels. Left Posterior chest site then numbed with lidocaine 2% by Dr [employer] Centesis 5F catheter placed into pleural space using US guidance. Fluid draining appears clear red. Sample of fluid obtained and placed into specimen containers to be sent for testing as ordered. 8422 Catheter attached to [employer] machine to remove fluid. 1004 thoracentesis complete. Total 730 ml removed. Catheter removed. Bandaid applied. VSS. Pt tolerated well. Verbal and tactile reassurance given throughout. 1022 Patient taken for CXR and specimen fluid taken to lab. Report given to THADDEUS SOLOMON [facility] and pt taken back to TCU by radiology RNs.

## 2021-02-16 NOTE — PROGRESS NOTES
0300am Resting with eyes closed. VSS MP-Sr. Zeng to cd.  No complaints  0500am up to toilet with one assist. Gait steady No complaints

## 2021-02-16 NOTE — PROGRESS NOTES
Nutrition Assessment    Type and Reason for Visit:  Reassess    Nutrition Recommendations/Plan:   Continue with 2g Na diet    Nutrition Assessment:  Nutritional status appears adequate, with avaiable information, noted has had frequent hospitalizations lately, unable to interview, off floor for thoracentesis at [facility]    Malnutrition Assessment:  Malnutrition Status: At risk for malnutrition (Comment)    Context:  Chronic Illness     Findings of the 6 clinical characteristics of malnutrition:  Energy Intake:  Mild decrease in energy intake (Comment)  Weight Loss:  1 - Mild weight loss (specify amount and time period)(~ 6% x 4 months)     Body Fat Loss:  Unable to assess     Muscle Mass Loss:  Unable to assess    Fluid Accumulation:  No significant fluid accumulation     Strength:  Not Performed      Nutrition Related Findings:  Frequent admissions for CHFm, for thoracentesis today, unable to examine/interview, off floor. Per EMR review intake appers fair , N GI complains, no edema, noted, had CHF diet material provided to pt on last admit,speaks primarily [place]      Wounds:  None       Current Nutrition Therapies:    DIET LOW SODIUM 2 GM; Anthropometric Measures:  · Height: 5' 3\" (160 cm)  · Current Body Weight: 161 lb (73 kg)   · Admission Body Weight: 163 lb (73.9 kg)    · Usual Body Weight: 171 lb (77.6 kg)(10/20)     · Ideal Body Weight: 115 lbs;     · BMI: 28.5  · BMI Categories: Overweight (BMI 25.0-29. 9)       Nutrition Diagnosis:   · No nutrition diagnosis at this time related to   as evidenced by        Nutrition Interventions:   Food and/or Nutrient Delivery:  Continue Current Diet  Nutrition Education/Counseling:  Education not appropriate   Coordination of Nutrition Care:  Continue to monitor while inpatient    Goals:  po  75%, wt stable ~ 160#       Nutrition Monitoring and Evaluation:    Food/Nutrient Intake Outcomes:  Food and Nutrient Intake  Physical Signs/Symptoms Outcomes: Meal Time Behavior, Weight     Discharge Planning:    No discharge needs at this time     Electronically signed by Stephen Mendoza RD, LD on 2/16/21 at 1:54 PM EST

## 2021-02-16 NOTE — PROGRESS NOTES
UROLOGY CONSULT Progress Note-Dr Angie Lemon    2/16/2021   10:39 AM    Name:  Santo Donald  MRN:    [id number]     Acct:     [de-identified]   Room:[facility]/[address] Day: 7     Admit Date: 2/9/2021  8:59 PM  PCP: Alvarado Stephens MD    Subjective:     C/C: Urinary retention    Interval History: Status: Multiple medical issues being managed with Zeng catheter while undergoing treatment for pleural effusion respiratory issues    Past Medical History:   Diagnosis Date    Ascending aortic aneurysm ([facility] [address] 6/23/2017    Charcot Arleen Tooth muscular atrophy     Depression     Descending aortic aneurysm ([facility] [address] 6/23/2017    Essential hypertension 2/16/2018    Headache     HTN (hypertension)     Impaired mobility and activities of daily living     Lumbar stenosis with neurogenic claudication     Myelopathy ([facility] [address]     Osteoarthritis        ROS:  Review of Systems    Medications: Allergies:    Allergies   Allergen Reactions    Latex     Tape [place] Tape]        Current Meds:     midodrine (PROAMATINE) tablet 5 mg, TID WC      DOPamine (INTROPIN) 400 mg in dextrose 5 % 250 mL infusion, Continuous      norepinephrine (LEVOPHED) 16 mg in dextrose 5 % 250 mL infusion, Continuous      furosemide (LASIX) tablet 20 mg, Daily      citalopram (CELEXA) tablet 20 mg, Daily      levothyroxine (SYNTHROID) tablet 88 mcg, Daily      aspirin chewable tablet 81 mg, Daily      vitamin B-12 (CYANOCOBALAMIN) tablet 1,000 mcg, Daily      [Held by provider] dilTIAZem (CARDIZEM CD) extended release capsule 120 mg, BID      rivaroxaban (XARELTO) tablet 15 mg, Daily      budesonide-formoterol (SYMBICORT) 160-4.5 MCG/ACT inhaler 2 puff, BID      carvedilol (COREG) tablet 3.125 mg, BID      LORazepam (ATIVAN) tablet 0.5 mg, Q6H PRN      nitroGLYCERIN (NITROSTAT) SL tablet 0.4 mg, Q5 Min PRN      pantoprazole (PROTONIX) tablet 40 mg, BID AC      potassium chloride (KLOR-CON M) extended release tablet 20 mEq, Daily with breakfast      simethicone (MYLICON) chewable tablet 80 mg, Q6H PRN      sodium chloride flush 0.9 % injection 10 mL, 2 times per day      sodium chloride flush 0.9 % injection 10 mL, PRN      promethazine (PHENERGAN) tablet 12.5 mg, Q6H PRN    Or      ondansetron (ZOFRAN) injection 4 mg, Q6H PRN      polyethylene glycol (GLYCOLAX) packet 17 g, Daily PRN      acetaminophen (TYLENOL) tablet 650 mg, Q6H PRN    Or      acetaminophen (TYLENOL) suppository 650 mg, Q6H PRN        Data:     Code Status:  Full Code    Family History   Problem Relation Age of Onset    Arthritis Mother     Arthritis Father     High Blood Pressure Father        Social History     Socioeconomic History    Marital status:      Spouse name: Not on file    Number of children: Not on file    Years of education: Not on file    Highest education level: Not on file   Occupational History    Not on file   Social Needs    Financial resource strain: Not on file    Food insecurity     Worry: Not on file     Inability: Not on file    Transportation needs     Medical: Not on file     Non-medical: Not on file   Tobacco Use    Smoking status: Never Smoker    Smokeless tobacco: Never Used   Substance and Sexual Activity    Alcohol use: No     Alcohol/week: 0.0 standard drinks    Drug use: No    Sexual activity: Not on file   Lifestyle    Physical activity     Days per week: 0 days     Minutes per session: 0 min    Stress: Only a little   Relationships    Social connections     Talks on phone: More than three times a week     Gets together: More than three times a week     Attends Hoah[employer]ism service: 1 to 4 times per year     Active member of club or organization: No     Attends meetings of clubs or organizations: Never     Relationship status:      Intimate partner violence     Fear of current or ex partner: No     Emotionally abused: No     Physically abused: No     Forced sexual activity: No   Other Topics Concern    Not on file   Social History Narrative    Lives With: Alone, son lives down the street, dtr is in the area    Has a son in the area    Type of Home[place] [address]: One level    Home Access: Stairs to enter with rails- Number of Steps: 2- Rails: Both    Bathroom Shower/Tub: Tub/Shower unit, Bathroom Equipment: Grab bars in shower, Shower chair    Home Equipment: Rolling walker, Cane(Pt infrequemtly uses DME for ambulation and prefers to furniture walk in home)    ADL Assistance: Independent, [address] [facility] Responsibilities: Yes    Ambulation Assistance: Independent, Transfer Assistance: Independent    Additional Comments: Son stops over 2 times daily           Vitals:  BP 81/61   Pulse 146   Temp 98.2 °F (36.8 °C) (Oral)   Resp 22   Ht 5' 3\" (1.6 m)   Wt 161 lb 13.1 oz (73.4 kg)   SpO2 96%   BMI 28.66 kg/m²   Temp (24hrs), Av.9 °F (36.6 °C), Min:97.4 °F (36.3 °C), Max:98.2 °F (36.8 °C)    No results for input(s): POCGLU in the last 72 hours. I/O (24Hr): Intake/Output Summary (Last 24 hours) at 2021 1039  Last data filed at 2021 0533  Gross per 24 hour   Intake 360 ml   Output 1050 ml   Net -690 ml       Labs:  Hematology:  Recent Labs     21  0453   WBC 8.6   HGB 10.8*   HCT 32.9*        Chemistry:  Recent Labs     21  0530 21  0530 21  0453      < > 139   K 3.7   < > 4.3      < > 103   CO2 27   < > 27   GLUCOSE 133*   < > 99   BUN 17   < > 20   CREATININE 0.89   < > 0.69   MG 1.7  --   --    ANIONGAP 11   < > 9   LABGLOM 59.8*   < > >60.0   GFRAA >60.0   < > >60.0   CALCIUM 8.8   < > 8.8    < > = values in this interval not displayed.        Urine Culture   Lab Results   Component Value Date    LABURIN 50,000 CFU/ml 2021         Physical Examination:        Physical Exam patient has dementia does not understand English Zeng catheter draining clear urine    Assessment: Urinary retention multifactorial etiology including dementia  Active Hospital Problems    Diagnosis Date Noted    Paroxysmal atrial fibrillation [facility]) [I48.0]      Priority: High    Impaired mobility and activities of daily living due to CHF [facility] admit 2021 [Z74.09, Z78.9]      Priority: High    Lumbar stenosis with neurogenic claudication [M48.062] 06/02/2016     Priority: Medium    Acute on chronic combined systolic (congestive) and diastolic (congestive) heart failure (HCC) [I50.43] 02/09/2021    Hypoxia [R09.02]     Osteoarthritis of lumbar spine with myelopathy [M47.16] 06/02/2016         Plan:        1.  Recommend Zeng catheter decompression until at least rehab phase of care      Electronically signed by Susan Ghotra MD on 2/16/2021 at 10:39 AM

## 2021-02-16 NOTE — PROGRESS NOTES
Pt awake and alert. Understands minimal english but able to states name and date of birth. Denies pain at this time. Heart rate 143 pt asymptomatic.  Repositioned in bed for breakfast.

## 2021-02-16 NOTE — FLOWSHEET NOTE
0000-- assumed care of patient, Patient assessment complete, asleep but easily arousable; oriented to person, place, and time, patient denies pain , denies n/v, denies cp & sob, call light in reach, bed in lowest position, bed alarm engaged, will continue to monitor

## 2021-02-17 ENCOUNTER — APPOINTMENT (OUTPATIENT)
Dept: CT IMAGING | Age: 86
DRG: 291 | End: 2021-02-17
Attending: FAMILY MEDICINE
Payer: MEDICARE

## 2021-02-17 LAB
ANION GAP SERPL CALCULATED.3IONS-SCNC: 6 MEQ/L (ref 9–15)
BUN BLDV-MCNC: 23 MG/DL (ref 8–23)
CALCIUM SERPL-MCNC: 9 MG/DL (ref 8.5–9.9)
CHLORIDE BLD-SCNC: 101 MEQ/L (ref 95–107)
CO2: 30 MEQ/L (ref 20–31)
CREAT SERPL-MCNC: 0.84 MG/DL (ref 0.5–0.9)
GFR AFRICAN AMERICAN: >60
GFR NON-AFRICAN AMERICAN: >60
GLUCOSE BLD-MCNC: 114 MG/DL (ref 70–99)
HCT VFR BLD CALC: 32.8 % (ref 37–47)
HEMOGLOBIN: 10.6 G/DL (ref 12–16)
MCH RBC QN AUTO: 28.6 PG (ref 27–31.3)
MCHC RBC AUTO-ENTMCNC: 32.2 % (ref 33–37)
MCV RBC AUTO: 88.8 FL (ref 82–100)
PDW BLD-RTO: 14.5 % (ref 11.5–14.5)
PLATELET # BLD: 303 K/UL (ref 130–400)
POTASSIUM SERPL-SCNC: 4.8 MEQ/L (ref 3.4–4.9)
PRO-BNP: 6184 PG/ML
RBC # BLD: 3.69 M/UL (ref 4.2–5.4)
SODIUM BLD-SCNC: 137 MEQ/L (ref 135–144)
WBC # BLD: 11 K/UL (ref 4.8–10.8)

## 2021-02-17 PROCEDURE — 36415 COLL VENOUS BLD VENIPUNCTURE: CPT

## 2021-02-17 PROCEDURE — 99231 SBSQ HOSP IP/OBS SF/LOW 25: CPT | Performed by: PHYSICAL MEDICINE & REHABILITATION

## 2021-02-17 PROCEDURE — 99232 SBSQ HOSP IP/OBS MODERATE 35: CPT | Performed by: INTERNAL MEDICINE

## 2021-02-17 PROCEDURE — 6360000004 HC RX CONTRAST MEDICATION: Performed by: INTERNAL MEDICINE

## 2021-02-17 PROCEDURE — 71260 CT THORAX DX C+: CPT

## 2021-02-17 PROCEDURE — 94761 N-INVAS EAR/PLS OXIMETRY MLT: CPT

## 2021-02-17 PROCEDURE — 2060000000 HC ICU INTERMEDIATE R&B

## 2021-02-17 PROCEDURE — 6370000000 HC RX 637 (ALT 250 FOR IP): Performed by: INTERNAL MEDICINE

## 2021-02-17 PROCEDURE — 80048 BASIC METABOLIC PNL TOTAL CA: CPT

## 2021-02-17 PROCEDURE — 93010 ELECTROCARDIOGRAM REPORT: CPT | Performed by: INTERNAL MEDICINE

## 2021-02-17 PROCEDURE — 85027 COMPLETE CBC AUTOMATED: CPT

## 2021-02-17 PROCEDURE — 83880 ASSAY OF NATRIURETIC PEPTIDE: CPT

## 2021-02-17 PROCEDURE — 6370000000 HC RX 637 (ALT 250 FOR IP): Performed by: FAMILY MEDICINE

## 2021-02-17 PROCEDURE — 97116 GAIT TRAINING THERAPY: CPT

## 2021-02-17 PROCEDURE — 97535 SELF CARE MNGMENT TRAINING: CPT

## 2021-02-17 PROCEDURE — 94640 AIRWAY INHALATION TREATMENT: CPT

## 2021-02-17 PROCEDURE — 93005 ELECTROCARDIOGRAM TRACING: CPT | Performed by: INTERNAL MEDICINE

## 2021-02-17 PROCEDURE — 2700000000 HC OXYGEN THERAPY PER DAY

## 2021-02-17 PROCEDURE — 2580000003 HC RX 258: Performed by: FAMILY MEDICINE

## 2021-02-17 RX ADMIN — Medication 10 ML: at 09:02

## 2021-02-17 RX ADMIN — MIDODRINE HYDROCHLORIDE 5 MG: 5 TABLET ORAL at 12:19

## 2021-02-17 RX ADMIN — IOPAMIDOL 100 ML: 612 INJECTION, SOLUTION INTRAVENOUS at 15:32

## 2021-02-17 RX ADMIN — CITALOPRAM HYDROBROMIDE 20 MG: 20 TABLET ORAL at 09:01

## 2021-02-17 RX ADMIN — MIDODRINE HYDROCHLORIDE 5 MG: 5 TABLET ORAL at 17:19

## 2021-02-17 RX ADMIN — ACETAMINOPHEN 650 MG: 325 TABLET ORAL at 23:12

## 2021-02-17 RX ADMIN — CARVEDILOL 3.12 MG: 3.12 TABLET, FILM COATED ORAL at 09:02

## 2021-02-17 RX ADMIN — POTASSIUM CHLORIDE 20 MEQ: 20 TABLET, EXTENDED RELEASE ORAL at 09:02

## 2021-02-17 RX ADMIN — LEVOTHYROXINE SODIUM 88 MCG: 0.09 TABLET ORAL at 05:51

## 2021-02-17 RX ADMIN — CYANOCOBALAMIN TAB 500 MCG 1000 MCG: 500 TAB at 09:02

## 2021-02-17 RX ADMIN — MIDODRINE HYDROCHLORIDE 5 MG: 5 TABLET ORAL at 09:02

## 2021-02-17 RX ADMIN — PANTOPRAZOLE SODIUM 40 MG: 40 TABLET, DELAYED RELEASE ORAL at 05:51

## 2021-02-17 RX ADMIN — DIGOXIN 125 MCG: 125 TABLET ORAL at 09:01

## 2021-02-17 RX ADMIN — ASPIRIN 81 MG: 81 TABLET, CHEWABLE ORAL at 09:02

## 2021-02-17 RX ADMIN — BUDESONIDE AND FORMOTEROL FUMARATE DIHYDRATE 2 PUFF: 160; 4.5 AEROSOL RESPIRATORY (INHALATION) at 09:09

## 2021-02-17 RX ADMIN — RIVAROXABAN 15 MG: 15 TABLET, FILM COATED ORAL at 17:19

## 2021-02-17 RX ADMIN — FUROSEMIDE 20 MG: 20 TABLET ORAL at 09:01

## 2021-02-17 RX ADMIN — BUDESONIDE AND FORMOTEROL FUMARATE DIHYDRATE 2 PUFF: 160; 4.5 AEROSOL RESPIRATORY (INHALATION) at 19:53

## 2021-02-17 RX ADMIN — PANTOPRAZOLE SODIUM 40 MG: 40 TABLET, DELAYED RELEASE ORAL at 17:18

## 2021-02-17 RX ADMIN — ACETAMINOPHEN 650 MG: 325 TABLET ORAL at 09:02

## 2021-02-17 RX ADMIN — Medication 10 ML: at 20:54

## 2021-02-17 RX ADMIN — CARVEDILOL 3.12 MG: 3.12 TABLET, FILM COATED ORAL at 20:54

## 2021-02-17 ASSESSMENT — ENCOUNTER SYMPTOMS
COUGH: 0
SHORTNESS OF BREATH: 1
GASTROINTESTINAL NEGATIVE: 1
PHOTOPHOBIA: 0
DIARRHEA: 0
BACK PAIN: 0
ABDOMINAL PAIN: 0
CHEST TIGHTNESS: 1
EYES NEGATIVE: 1
NAUSEA: 0
CONSTIPATION: 0
VOMITING: 0
WHEEZING: 0

## 2021-02-17 ASSESSMENT — PAIN DESCRIPTION - LOCATION: LOCATION: RIB CAGE

## 2021-02-17 ASSESSMENT — PAIN SCALES - GENERAL
PAINLEVEL_OUTOF10: 6
PAINLEVEL_OUTOF10: 3

## 2021-02-17 ASSESSMENT — PAIN DESCRIPTION - PAIN TYPE: TYPE: ACUTE PAIN

## 2021-02-17 NOTE — FLOWSHEET NOTE
Patient for CT of chest via wheelchair.  Electronically signed by Jose Stubbs RN on 2/17/2021 at 3:21 PM

## 2021-02-17 NOTE — PROGRESS NOTES
INPATIENT PROGRESS NOTES    PATIENT NAME: Arnel Nicole  MRN: [id number]  SERVICE DATE:  February 17, 2021   SERVICE TIME:  2:28 PM      PRIMARY SERVICE: Pulmonary Disease    CHIEF COMPLAINTS: Pleural effusion    INTERVAL HPI: Patient seen and examined at bedside, Interval Notes, orders reviewed. Nursing notes noted    Patient report feeling better, no shortness of breath, no coughing, she is on 4 L O2 saturation 95 to 96%, no fever overnight, blood pressure is okay, thoracentesis done yesterday and 700 cc removed    Review of system:     GI Abdominal pain No  Skin Rash No    Social History     Tobacco Use    Smoking status: Never Smoker    Smokeless tobacco: Never Used   Substance Use Topics    Alcohol use: No     Alcohol/week: 0.0 standard drinks         Problem Relation Age of Onset    Arthritis Mother     Arthritis Father     High Blood Pressure Father          OBJECTIVE    Body mass index is 28.66 kg/m². PHYSICAL EXAM:  Vitals:  BP (!) 103/47   Pulse 66   Temp 98.4 °F (36.9 °C) (Oral)   Resp 20   Ht 5' 3\" (1.6 m)   Wt 161 lb 13.1 oz (73.4 kg)   SpO2 95%   BMI 28.66 kg/m²     General: alert, cooperative, no distress  Head: normocephalic, atraumatic  Eyes:No gross abnormalities. ENT:  MMM no lesions  Neck:  supple and no masses  Chest : Few rales at the bases otherwise clear no wheezing, nontender, tympanic  Heart[de-identified] Heart sounds are normal.  Regular rate and rhythm without murmur, gallop or rub. ABD:  symmetric, soft, non-tender, no guarding or rebound  Musculoskeletal : no cyanosis, no clubbing and no edema  Neuro:  Grossly normal  Skin: No rashes or nodules noted.   Lymph node:  no cervical nodes  Urology: No Zeng   Psychiatric: appropriate    DATA:   Recent Labs     02/16/21  0453 02/17/21  0619   WBC 8.6 11.0*   HGB 10.8* 10.6*   HCT 32.9* 32.8*   MCV 88.3 88.8    303     Recent Labs     02/16/21 0452 02/16/21  0453 02/17/21  0619   NA  --  139 137   K  --  4.3 4.8   CL  --  103 101   CO2  --  27 30   BUN  --  20 23   CREATININE  --  0.69 0.84   GLUCOSE  --  99 114*   CALCIUM  --  8.8 9.0   PROT 6.2*  --   --    LABALBU 2.6*  --   --    BILITOT 0.3  --   --    ALKPHOS 48  --   --    AST 16  --   --    ALT 14  --   --    LABGLOM  --  >60.0 >60.0   GFRAA  --  >60.0 >60.0       MV Settings:     FiO2 : 100 %    No results for input(s): PHART, GVO0XHI, PO2ART, ZXB5PYE, BEART, O8KVCRWP in the last 72 hours.     O2 Device: Nasal cannula  O2 Flow Rate (L/min): 4 L/min    DIET LOW SODIUM 2 GM;     MEDICATIONS during current hospitalization:    Continuous Infusions:    Scheduled Meds:   digoxin  125 mcg Oral Daily    midodrine  5 mg Oral TID WC    furosemide  20 mg Oral Daily    citalopram  20 mg Oral Daily    levothyroxine  88 mcg Oral Daily    aspirin  81 mg Oral Daily    vitamin B-12  1,000 mcg Oral Daily    [Held by provider] dilTIAZem  120 mg Oral BID    rivaroxaban  15 mg Oral Daily    budesonide-formoterol  2 puff Inhalation BID    carvedilol  3.125 mg Oral BID    pantoprazole  40 mg Oral BID AC    potassium chloride  20 mEq Oral Daily with breakfast    sodium chloride flush  10 mL Intravenous 2 times per day       PRN Meds:LORazepam, nitroGLYCERIN, simethicone, sodium chloride flush, promethazine **OR** ondansetron, polyethylene glycol, acetaminophen **OR** acetaminophen    Radiology  Echo Complete 2d W Doppler W Color    Result Date: 1/27/2021  Transthoracic Echocardiography Report (TTE)  Demographics  Patient Name   Aman Rod        Gender              Female                 Elsi Angel  Patient Number [id number]          Race                                                   Ethnicity  Visit Number   [id number]         Room Number         Z832  Corporate ID                     Date of Study       01/27/2021  Accession      [id number]        Referring Physician  Number  Date of Birth  07/12/1932        Sonographer         Charan STRONG  Age            80 year(s) AO Root Dimension: 3.62 cm                                         ACS: 1.25 cm                                         LA: 4.54 cm                                         LVOT: 2.04 cm Doppler Measurements:  AV Velocity:0.02 m/s                    MV Peak E-Wave: 0.85 m/s  AV Peak Gradient: 10.44 mmHg            MV Peak A-Wave: 0.87 m/s  AV Mean Gradient: 6.01 mmHg  AV Area (Continuity):1.83 cm^2  TR Velocity:2.62 m/s                    Estimated RAP:10 mmHg  TR Gradient:27.54 mmHg                  RVSP:37.54 mmHg Valves  Mitral Valve  Peak E-Wave: 0.85 m/s                 Peak A-Wave: 0.87 m/s                                        E/A Ratio: 0.97                                        Peak Gradient: 2.88 mmHg                                        Deceleration Time: 232.8 msec  Tissue Doppler  E' Septal Velocity: 0.07 m/s  E' Lateral Velocity: 0.08 m/s  Aortic Valve  Peak Velocity: 1.62 m/s                Mean Velocity: 1.15 m/s  Peak Gradient: 10.44 mmHg              Mean Gradient: 6.01 mmHg  Area (continuity): 1.83 cm^2           Area (2D): 1.8 cm^2  AV VTI: 28.8 cm  Cusp Separation: 1.25 cm  Tricuspid Valve  Estimated RVSP: 37.54 mmHg              Estimated RAP: 10 mmHg  TR Velocity: 2.62 m/s                   TR Gradient: 27.54 mmHg  Pulmonic Valve  Peak Velocity: 0.96 m/s           Peak Gradient: 3.66 mmHg                                    Estimated PASP: 37.54 mmHg  LVOT  Peak Velocity: 0.89 m/s              Mean Velocity: 0.57 m/s  Peak Gradient: 3.16 mmHg             Mean Gradient: 1.58 mmHg  LVOT Diameter: 2.04 cm               LVOT VTI: 16.15 cm Structures  Left Atrium  LA Dimension: 4.54 cm                        LA Area: 11.81 cm^2  LA/Aorta: 1.25  LA Volume/Index: 44.64 ml /25 m^2  Left Ventricle  Diastolic Dimension: 7.12 cm         Systolic Dimension: 7.21 cm  Septum Diastolic: 4.36 cm            Septum Systolic: 3.82 cm  PW Diastolic: 1.5 cm                 PW Systolic: 6.56 cm FS: 19.1 %  LV EDV/LV EDV Index: 87.32 ml/49 m^2 LV ESV/LV ESV Index: 52.68 ml/29 m^2  EF Calculated: 39.7 %                LV Length: 6.12 cm  LVOT Diameter: 2.04 cm  Right Atrium  RA Systolic Pressure: 10 mmHg  Right Ventricle  Diastolic Dimension: 7.81 cm                                    RV Systolic Pressure: 23.82 mmHg Aorta/ Miscellaneous Aorta  Aortic Root: 3.62 cm  LVOT Diameter: 2.04 cm    Xr Chest (2 Vw)    1. No evidence of pneumothorax. Resolution of left pleural effusion. Cardiac silhouette remains enlarged. Left lower lobe hazy opacity may represent atelectasis versus pneumonia or infiltrate. COMPARISON: No prior studies available for comparison. DIAGNOSIS:  Post left thoracentesis. Dr. Kate Baker to read. COMMENTS: I50.43 Acute on chronic combined systolic and diastolic CHF (congestive heart failure) ([facility]) ICD10 TECHNIQUE: XR CHEST (2 VW) FINDINGS: Left lower lobe opacity may represent atelectasis versus pneumonia or infiltrate. Interval resolution of left pleural effusion. No evidence of pneumothorax. Cardiac silhouette is enlarged. Visualized soft tissues, and osseous structures are unremarkable. Cta Chest W Wo Contrast    Result Date: 1/26/2021  EXAMINATION:  CHEST CTA WITH CONTRAST (PULMONARY EMBOLISM PROTOCOL) CLINICAL HISTORY:   PE   I50.43 Acute on chronic combined systolic and diastolic CHF (congestive heart failure) ([facility] [address] [address] ICD10. Technique:  Spiral axial CT acquisition of the chest from the thoracic inlet to the upper abdomen following IV contrast.  2-D images were reconstructed in the sagittal and coronal planes. 3-D MIPS images were generated in the coronal and axial planes. Images were reviewed on the PACS workstation. All images including the 3-D MIPS were personally archived.  Contrast:  IV administration of 100 ml Isovue 300 All CT scans at this facility use dose modulation, iterative reconstruction, and/or weight based dosing when appropriate to reduce radiation dose to as low as reasonably achievable. Comparison:  CTA chest 3/15/2019  RESULT: Limitations: Motion artifact. Evaluation for thromboembolic disease:      - Right heart chambers:  No thromboembolic disease.      - Main pulmonary arteries:  No thromboembolic disease.      - Lobar pulmonary arteries:  No thromboembolic disease.        - Segmental pulmonary arteries:  No thromboembolic disease.        - Subsegmental pulmonary arteries:  Not well visualized due to motion. Lines, tubes, and devices:  None. Lung parenchyma and pleura: Tortuous course of the trachea. Central airways appear grossly patent. Moderate left and small right pleural effusions with associated atelectasis. Limitations in evaluation of the lung parenchyma secondary to motion. Other areas of probable scarring/atelectasis. No pneumothorax. Thoracic inlet, heart, and mediastinum: Visualized thyroid unremarkable. No axillary, mediastinal, or hilar lymphadenopathy. Ascending thoracic aorta aneurysmal, measuring around 5.0 cm, similar to prior. Descending thoracic aorta aneurysmal and measures  up to 4.8 cm, also similar to prior. Normal pulmonary artery size. Cardiomegaly, similar to prior Scattered coronary artery calcifications. Small pericardial effusion. Small hiatal hernia. Bones and soft tissues:  No destructive bone lesion or acute osseous findings. Osteopenia. Scoliosis and kyphosis and multilevel degenerative changes, grossly similar to prior. Chest wall unremarkable. Upper abdomen:  No acute abnormality in the imaged upper abdomen. Reflux of contrast into the hepatic veins, associated with right heart failure. Lower neck: Unremarkable. No CT evidence of pulmonary embolism. Moderate left and small right pleural effusions. Cardiomegaly and small pericardial effusion. Reflux of contrast into the hepatic veins, associated with right heart failure.  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 No lung consolidative opacity. Areas of probable atelectasis/scarring. Ct Abdomen Pelvis W Iv Contrast    Result Date: 2/8/2021  EXAMINATION: CT ABDOMEN PELVIS W IV CONTRAST DATE AND TIME:2/8/2021 10:36 AM CLINICAL HISTORY: Acute abdominal pain. Epigastric pain. epigastric pain  COMPARISON: None available. TECHNIQUE: Contiguous axial CT sections of the abdomen and pelvis. 100 cc's of IV contrast given. .  All CT scans at this facility use dose modulation, iterative reconstruction, and/or weight based dosing when appropriate to reduce radiation dose to as low as reasonably achievable. FINDINGS   Liver: Small hypodensities in the right hepatic lobe the largest measuring 8 mm consistent with small cysts. Spleen: Negative    Pancreas: Negative     Kidney: Negative   Adrenal glands are negative. Bowel: Negative    Nodes: No lymphadenopathy. Aorta: Dilated descending thoracic aorta maximum diameter approximately 4 cm adjacent appearance from the CT scans from January 25, 2021. No infrarenal abdominal aortic aneurysm. Peritoneum: No free fluid or free air. The abdominal wall is intact. Pelvis : 9.4 x 6.5 x 6.6 cm cystic mass in the left adnexa. This appears to be a chronic finding this patient was present on an MRI scan that included this area back in April 2016. Bladder catheter in place with decompressed bladder. Bones: No acute osseous abnormalities. Lung bases: Persistent bibasilar effusions not atelectasis left greater than right. PERSISTENT BIBASILAR PLEURAL-PARENCHYMAL CHANGES. NO ACUTE PATHOLOGY IN THE ABDOMEN OR PELVIS. Xr Chest Portable    1. No evidence of pneumothorax. Near complete resolution of the left pleural effusion. Persistent left lower lobe opacification, may represent pneumonia, though underlying nodule or mass cannot be excluded. There is vascular congestion consistent with mild pulmonary edema.  COMPARISON: February 13, 2021 DIAGNOSIS:  post left thoracentesis- show Dr Lancaster Apa: I50.43 Acute on chronic combined systolic (congestive) and diastolic (congestive) heart failure ([facility] [address] ICD10 TECHNIQUE: XR CHEST PORTABLE FINDINGS: Left lower lobe opacity may represent pneumonia though underlying nodule or mass cannot be excluded. No evidence of pneumothorax. Near complete resolution of the left pleural effusion. Increase in vascular congestion, consistent with pulmonary edema. Cardiac silhouette remains enlarged. Visualized soft tissues, and osseous structures are unremarkable. Xr Chest Portable    Result Date: 2/13/2021  EXAMINATION: XR CHEST PORTABLE. DATE AND TIME:2/13/2021 3:16 AM CLINICAL HISTORY: Shortness of breath   hypoxia  COMPARISONS: February 10, 2021  FINDINGS: Persistent extensive opacity throughout the left hemithorax consistent with effusion and consolidation. Minimal pleural parenchymal changes at the right base. Overall findings are unchanged     No significant change     Xr Chest Portable    Result Date: 2/10/2021  EXAMINATION: CHEST PORTABLE VIEW  CLINICAL HISTORY: Hypoxia COMPARISONS: February 8, 2021 0831 hours  FINDINGS: Single  views of the chest is submitted. The cardiac silhouette is enlarged Pulmonary vascular remains mildly congested. Right sided trachea. Patchy to coalescent infiltrate within the right lower lobe as well as an area of infiltrate/consolidation left lower lobe with trace right pleural effusion and left pleural effusion. .  No Pneumothoraces. PULMONARY VASCULAR REMAINS MILDLY CONGESTED. COULD SUGGEST COMPONENT OF UNDERLYING CHF. CORRELATE CLINICALLY PATCHY TO COALESCENT INFILTRATE WITHIN THE RIGHT LOWER LOBE AS WELL AS AN AREA OF INFILTRATE/CONSOLIDATION LEFT LOWER LOBE WITH TRACE RIGHT PLEURAL EFFUSION AND LEFT PLEURAL EFFUSION. Luiz Fly Chest Portable    Result Date: 2/8/2021  Exam: XR CHEST PORTABLE History: Shortness of breath. Chest pain. Technique: AP portable view of the chest obtained. Comparison: Portable chest radiograph from February 7, 2021 Findings: Heart size is enlarged. Small bilateral pleural effusions. Bibasilar opacities, left greater than right. No pneumothorax. No acute osseous abnormality. Small bilateral pleural effusions, left greater than right. Bibasilar atelectasis and/or infiltrate, left greater than right. Xr Chest Portable    Result Date: 2/8/2021  EXAMINATION: XR CHEST PORTABLE REASON FOR EXAM: chest pain FINDINGS: Frontal view of the chest reveals cardiomegaly and prominent central pulmonary vasculature consistent with chronic congestion. Findings similar and unchanged from 2/3/2021. Opacification at the left base consistent with residual pneumonitis/atelectasis remains unchanged from previous study 4 days earlier. PNEUMONIA/ATELECTASIS LEFT BASE. CARDIOMEGALY. MILD CHRONIC CENTRAL VASCULAR CONGESTION. NO SIGNIFICANT CHANGE FROM 2/3/2020     Xr Chest Portable    Result Date: 2/4/2021  EXAMINATION: XR CHEST PORTABLE CLINICAL HISTORY: SHORTNESS OF BREATH, CHEST PAIN COMPARISONS: CTA CHEST, JANUARY 25, 2021. CHEST RADIOGRAPHS JANUARY 28, JANUARY 29, 2021. FINDINGS: Osseous structures intact. Cardiopericardial silhouette enlarged and unchanged. Cephalization pulmonary vasculature. No focal airspace disease. Increased opacity left lower lung obscures left diaphragm. STABLE MARKED CARDIOMEGALY. PROBABLE INTERSTITIAL EDEMA. LEFT LOWER LUNG ATELECTASIS/PNEUMONIA. Xr Chest Portable    Result Date: 1/28/2021  EXAMINATION: CHEST PORTABLE VIEW  CLINICAL HISTORY: Short of breath COMPARISONS: None  FINDINGS: Single  views of the chest is submitted. The cardiac silhouette is enlarged. Pulmonary vascular unremarkable. Right sided trachea. Left lower lobe infiltrate/consolidation, collapse with left sided pleural effusion. LEFT LOWER LOBE INFILTRATE/CONSOLIDATION, COLLAPSE WITH LEFT SIDED PLEURAL EFFUSION    Xr Chest Portable    Result Date: 1/26/2021  EXAMINATION: Portable AP ERECT view of the chest. CLINICAL HISTORY:  SOB , Negative Covid-19 test. DATE: 1/25/2021 5:41 PM COMPARISONS: 7/24/2017 FINDINGS: The heart is moderately enlarged, similar to prior exam.  Prominent interstitial markings, consistent with increasing Central vascular congestion. The costophrenic angles are blunted secondary to pleural effusions bilaterally, left greater than  right. No pneumothorax. There are infiltrates/atelectasis within lung bases, left greater than right. There are moderate degenerative changes in spine. CARDIAC ENLARGEMENT WITH CENTRAL VESSEL CONGESTION AND BILATERAL PLEURAL EFFUSIONS, POSSIBLE CONGESTIVE HEART FAILURE. BIBASILAR INFILTRATES/ATELECTASIS, LEFT GREATER THAN RIGHT. Ir Guided Thoracentesis Pleural    Technically successful ultrasound-guided thoracentesis without immediate complications. HISTORY: Hansel Mariscal is a Female of 80 years age. DIAGNOSIS: Left pleural effusion COMMENTS: I50.43 Acute on chronic combined systolic (congestive) and diastolic (congestive) heart failure (HCC) ICD10 PROCEDURE: Following the discussion of procedure, risks versus benefits, possible complications, informed consent was obtained. Following universal protocol, patient and site verification was performed with a \"timeout\" prior to the procedure. Brief survey of the patient's left hemithorax demonstrate moderate amount of pleural effusion. After selection of an appropriate site for thoracentesis, the thoracic skin was prepped and draped in usual sterile fashion. Under ultrasound guidance, using lidocaine for local anesthesia, a 19-gauge [employer] catheter was inserted in the pleural cavity until effusion was aspirated. Using Vacutainer bottles, 730 mL of effusion was drained.   The catheter was removed and the aspiration site dressed. The patient tolerated procedure well. No immediate complications were identified. Subsequent chest radiograph demonstrated no evidence of pneumothorax. The patient left the radiology department in stable condition. Radiology nurse monitored patient's  vital signs throughout the procedure which lasted approximately 30 minutes. Ir Guided Thoracentesis Pleural    Technically successful ultrasound-guided thoracentesis without immediate complications. HISTORY: Jesus Cuevas is a Female of 80 years age. DIAGNOSIS: Left pleural effusion COMMENTS: I50.43 Acute on chronic combined systolic and diastolic CHF (congestive heart failure) (HCC) ICD10 PROCEDURE: Following the discussion of procedure, risks versus benefits, possible complications, informed consent was obtained. Following universal protocol, patient and site verification was performed with a \"timeout\" prior to the procedure. Brief survey of the patient's left hemithorax demonstrate moderate amount of pleural effusion. After selection of an appropriate site for thoracentesis, the thoracic skin was prepped and draped in usual sterile fashion. Under ultrasound guidance, using lidocaine for local anesthesia, a 19-gauge Yueh catheter was inserted in the pleural cavity until effusion was aspirated. Using Vacutainer bottles, 755 mL of clear yellow effusion was drained. The catheter was removed and the aspiration site dressed. The patient tolerated procedure well. No immediate complications were identified. Subsequent chest radiograph demonstrated no evidence of pneumothorax. The patient left the radiology department in stable condition. Radiology nurse monitored patient's  vital signs throughout the procedure which lasted approximately 30 minutes.      CXR reviewed by me improved left sided effusion         IMPRESSION AND SUGGESTION:  Patient is at risk due to   · Recurrent exudative lymphocyte predominant pleural effusion  · Paroxysmal A.  Fib    Recommendation  · Follow-up pleural fluid cytology  · If negative will need VATS and biopsy  · Will obtain CT chest  · O2 to keep sat 90 to 92%  · Incentive spirometry and flutter device  · Encourage activity          Electronically signed by Evy Chavarria MD,  [facility]   on 2/17/2021 at 2:28 PM

## 2021-02-17 NOTE — PROGRESS NOTES
02/15/21  0451 02/16/21  0453 02/17/21  0619   WBC 10.0 8.6 11.0*   HGB 11.3* 10.8* 10.6*   HCT 35.1* 32.9* 32.8*    287 303     Recent Labs     02/15/21  0451 02/16/21  0453 02/17/21  0619    139 137   K 4.2 4.3 4.8    103 101   CO2 29 27 30   BUN 23 20 23   CREATININE 0.81 0.69 0.84   CALCIUM 8.9 8.8 9.0     Recent Labs     02/16/21  0452   AST 16   ALT 14   BILIDIR <0.2   BILITOT 0.3   ALKPHOS 48     No results for input(s): INR in the last 72 hours. No results for input(s): Myra Journey in the last 72 hours. Urinalysis:      Lab Results   Component Value Date    NITRU Negative 02/07/2021    WBCUA 3-5 02/07/2021    BACTERIA Negative 02/07/2021    RBCUA 0-2 02/07/2021    BLOODU TRACE 02/07/2021    SPECGRAV 1.019 02/07/2021    GLUCOSEU Negative 02/07/2021       Radiology:  IR GUIDED THORACENTESIS PLEURAL   Final Result   Technically successful ultrasound-guided thoracentesis without immediate complications. HISTORY: Angie Andre is a Female of 80 years age. DIAGNOSIS: Left pleural effusion      COMMENTS: I50.43 Acute on chronic combined systolic (congestive) and diastolic (congestive) heart failure (HCC) ICD10         PROCEDURE:   Following the discussion of procedure, risks versus benefits, possible complications, informed consent was obtained. Following universal protocol, patient and site verification was performed with a \"timeout\" prior to the procedure. Brief survey of the patient's left hemithorax demonstrate moderate amount of pleural effusion. After selection of an appropriate site for thoracentesis, the thoracic skin was prepped and draped in usual sterile fashion. Under ultrasound guidance,    using lidocaine for local anesthesia, a 19-gauge Yueh catheter was inserted in the pleural cavity until effusion was aspirated. Using Vacutainer bottles, 730 mL of effusion was drained. The catheter was removed and the aspiration site dressed. The patient tolerated procedure well. No immediate complications were identified. Subsequent chest radiograph demonstrated no evidence of pneumothorax. The patient left the radiology department in stable condition. Radiology nurse monitored patient's    vital signs throughout the procedure which lasted approximately 30 minutes. XR CHEST PORTABLE   Final Result   1. No evidence of pneumothorax. Near complete resolution of the left pleural effusion. Persistent left lower lobe opacification, may represent pneumonia, though underlying nodule or mass cannot be excluded. There is vascular congestion consistent with    mild pulmonary edema. COMPARISON: February 13, 2021      DIAGNOSIS:  post left thoracentesis- show Dr Jessi Rubi: I50.43 Acute on chronic combined systolic (congestive) and diastolic (congestive) heart failure ([address] ICD10      TECHNIQUE: XR CHEST PORTABLE      FINDINGS:   Left lower lobe opacity may represent pneumonia though underlying nodule or mass cannot be excluded. No evidence of pneumothorax. Near complete resolution of the left pleural effusion. Increase in vascular congestion, consistent with pulmonary edema. Cardiac silhouette remains enlarged. Visualized soft tissues, and osseous structures are unremarkable. XR CHEST PORTABLE   Final Result   No significant change                  XR CHEST PORTABLE   Final Result   PULMONARY VASCULAR REMAINS MILDLY CONGESTED. COULD SUGGEST COMPONENT OF UNDERLYING CHF. CORRELATE CLINICALLY   PATCHY TO COALESCENT INFILTRATE WITHIN THE RIGHT LOWER LOBE AS WELL AS AN AREA OF INFILTRATE/CONSOLIDATION LEFT LOWER LOBE WITH TRACE RIGHT PLEURAL EFFUSION AND LEFT PLEURAL EFFUSION. .        XR CHEST (2 VW)    (Results Pending)     Assessment/Plan:    Shock- Resolved; on midodrine   Acute on chronic combined systolic and diastolic CHF- cardiology is managing; pressure is much improved  Pleural effusion- s/p thoracentesis; added on LFT and LDH to morning labs to evaluate lights criteria; likely due to the CHF  Paroxsymal A fib w RVR- No longer in RVR; blood pressure improved with digoxin  Hypothyroidism- synthroid  Urinary retention - taylor in place; voiding trial in acute rehab    Active Hospital Problems    Diagnosis Date Noted    Paroxysmal atrial fibrillation [facility]) [I48.0]      Priority: High    Impaired mobility and activities of daily living due to CHF exac, [address] [facility] admit 2021 [Z74.09, Z78.9]      Priority: High    Lumbar stenosis with neurogenic claudication [M48.062] 06/02/2016     Priority: Medium    Acute on chronic combined systolic (congestive) and diastolic (congestive) heart failure ([facility] [I50.43] 02/09/2021    Hypoxia [R09.02]     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 I am managing a portion of pt care. Some medical issues are handled by other specialists. Additional work up and treatment should be done in out pt setting by pt PCP and other out pt providers. In addition to examining and evaluating pt, I spent additional time explaining care, normal and abnormal findings, and treatment plan. All of pt questions were answered. Counseling, diet and education were  provided. Case will be discussed with nursing staff when appropriate. Family will be updated if and when appropriate.       Diet: DIET LOW SODIUM 2 GM;    Code Status: Full Code    PT/OT Eval   Electronically signed by Juan Carlos Johns MD on 2/17/2021 at 1:46 PM

## 2021-02-17 NOTE — PROGRESS NOTES
Physical Therapy Med Surg Daily Treatment Note  Facility/Department: [facility]  Room: [place] [address]       NAME: Ayan Pruitt  : 1932 (80 y.o.)  MRN: [id number]  CODE STATUS: Full Code    Date of Service: 2021    Patient Diagnosis(es): Acute on chronic combined systolic (congestive) and diastolic (congestive) heart failure ([address] [I50.43]   No chief complaint on file.     Patient Active Problem List    Diagnosis Date Noted    Paroxysmal atrial fibrillation [facility])      Priority: High    Impaired mobility and activities of daily living due to CHF [facility] admit       Priority: High    Lumbar stenosis with neurogenic claudication 2016     Priority: Medium    Acute on chronic combined systolic (congestive) and diastolic (congestive) heart failure (HCC) 2021    Pleural effusion 2021    Hypoxia     Acute pulmonary edema (HCC)     Gait abnormality 2021    Acute on chronic combined systolic and diastolic CHF (congestive heart failure) ([address] 2021    Essential hypertension 2018    Shortness of breath     Dizziness     Ascending aortic aneurysm (HCC) 2017    Descending aortic aneurysm ([address] 2017    Osteoarthritis     Myelopathy ([address]     Headache     Depression     Acquired scoliosis 2016    Osteoporosis 2016    Charcot Arleen Tooth muscular atrophy 2016    Excess weight 2016    Osteoarthritis of lumbar spine with myelopathy 2016        Past Medical History:   Diagnosis Date    Ascending aortic aneurysm ([address] 2017    Charcot Arleen Tooth muscular atrophy     Depression     Descending aortic aneurysm ([address] 2017    Essential hypertension 2018    Headache     HTN (hypertension)     Impaired mobility and activities of daily living     Lumbar stenosis with neurogenic claudication     Myelopathy ([address]     Osteoarthritis      Past Surgical History:   Procedure Laterality Date  JOINT REPLACEMENT      THORACENTESIS Left 01/29/2021    total of 755 cc removed per Dr Shey Godwin specimen sent to lab          Restrictions  Restrictions/Precautions: Fall Risk    SUBJECTIVE   Subjective  Subjective: I'm doing good today. Pre-Session Pain Report  Pre Treatment Pain Screening  Pain at present: 0  Intervention List: Patient able to continue with treatment          Post-Session Pain Report  Pain Assessment  Pain Level: 0         OBJECTIVE   Orientation  Overall Orientation Status: Within Functional Limits    Bed mobility  Supine to Sit: Supervision  Comment: bed flat with use of hand rails; increased time to complete. Transfers  Sit to Stand: Stand by assistance  Stand to sit: Stand by assistance  Bed to Chair: Stand by assistance  Comment: vc's and demo for proper hand placement with WW. fair + carryover. Ambulation 1  Surface: level tile  Device: Rolling Walker  Other Apparatus: O2  Assistance: Stand by assistance  Quality of Gait: FF posture, reciprocal pattern with shortened step length; vc's for posture and step length  Gait Deviations: Slow Lea; Increased JAMES; Decreased step length;Decreased step height  Distance: 20'x3                                         Activity Tolerance  Activity Tolerance: Patient Tolerated treatment well          ASSESSMENT   Assessment: Good participation; Pt ambulated with [address] and able to increase distance without complaint. No SOB after ambulation on this date. Discharge Recommendations:  Continue to assess pending progress    Goals  Long term goals  Long term goal 1: Pt will perform bed mobility with indep  Long term goal 2: Pt will perform functional transfers with indep  Long term goal 3: Pt will ambulate >/=50ft with 2ww and mod I  Long term goal 4: Pt will negotiate 4 steps with handrail and SBA    PLAN    Safety Devices  Type of devices: All fall risk precautions in place;Call light within reach; Chair alarm in place; Left in chair     [facility] (6 CLICK) BASIC MOBILITY  AM-PAC Inpatient Mobility Raw Score : 18      Therapy Time   Individual   Time In 1026   Time Out 1049   Minutes 23      bm/Trsf - 10 mins  Gait - 13 mins       Diego Perez PTA, 02/17/21 at 10:58 AM       Definitions for assistance levels  Independent = pt does not require any physical supervision or assistance from another person for activity completion. Device may be needed.   Stand by assistance = pt requires verbal cues or instructions from another person, close to but not touching, to perform the activity  Minimal assistance= pt performs 75% or more of the activity; assistance is required to complete the activity  Moderate assistance= pt performs 50% of the activity; assistance is required to complete the activity  Maximal assistance = pt performs 25% of the activity; assistance is required to complete the activity  Dependent = pt requires total physical assistance to accomplish the task

## 2021-02-17 NOTE — PROGRESS NOTES
Subjective: The patient complains of severe acute on chronic progressive fatigue and pain on exertion shortness of breath partially relieved by rest, PT, OT and meds and exacerbated by exertion and recent illness. I am concerned about patients medical complexities. She is now on a medical floor but still has cardiac issues nursing note noted below. Reviewed recent nursing note, \" PM assessment completed. Patient complaining of chest pain under her left breast. Spoke with . He will see patient. Patient voices no other needs at this time. .2100: Patient repeatedly calls for nurse. Patient states that pain is still present. ... 2130: Patient medicated with tylenol for pain. ... 2230: Patient sleeping. No signs of distress noted. \".    ROS x10: The patient also complains of severely impaired mobility and activities of daily living. Otherwise no new problems with vision, hearing, nose, mouth, throat, dermal, cardiovascular, GI, , pulmonary, musculoskeletal, psychiatric or neurological. See Rehab consult on Rehab chart . Vital signs:  BP (!) 133/90   Pulse 70   Temp 98 °F (36.7 °C) (Oral)   Resp 20   Ht 5' 3\" (1.6 m)   Wt 161 lb 13.1 oz (73.4 kg)   SpO2 98%   BMI 28.66 kg/m²   I/O:   PO/Intake:     No problems with PO intake, low-sodium no milk. Bowel/Bladder:   continent,    General:  Patient is well developed, adequately nourished, non-obese and     well kempt. HEENT:    PERRLA, hearing intact to loud voice, external inspection of ear     and nose benign. Inspection of lips, tongue and gums benign  Musculoskeletal: No significant change in strength or tone. All joints stable. Inspection and palpation of digits and nails show no clubbing,       cyanosis or inflammatory conditions. Neuro/Psychiatric: Affect: flat-  Alert and oriented to self and     Situation with  min cues.   No significant change in deep tendon reflexes or     sensation  Lungs:  Diminished, CTA-B. Respiration effort is normal at rest.  MOLINA  Heart:   S1 = S2,    irreg . No loud murmurs. Abdomen:  Soft, non-tender, no enlargement of liver or spleen. Extremities:  No significant lower extremity edema or tenderness. Skin:    BUE bruises dt blood draws, no visualized or palpated problems. Rehabilitation:  Physical therapy: FIMS:  Bed Mobility: Scooting: Supervision    Transfers: Sit to Stand: Stand by assistance  Stand to sit: Stand by assistance  Bed to Chair: Stand by assistance, Ambulation 1  Surface: level tile  Device: Rolling Walker  Other Apparatus: O2  Assistance: Stand by assistance  Quality of Gait: FF posture, reciprocal pattern with shortened step length; vc's for posture and step length  Gait Deviations: Slow Lea, Increased JAMES, Decreased step length, Decreased step height  Distance: 20'x3  Comments: steady, with turning with 2ww,      FIMS:  ,        Occupational therapy: FIMS:   ,  , Assessment: Pt is an 81 y/o female from home alone who presents to [address] [place] [address] with the above deficits which impact her independence and safety during ADLs. Pt limited d/t fatigue, weakness and decreased endurance. Pt would benefit from OT  to maximize independence and safety during ADLs.     Speech therapy: FIMS:        Lab/X-ray studies reviewed, analyzed and discussed with patient and staff:   Recent Results (from the past 24 hour(s))   Basic Metabolic Panel    Collection Time: 02/17/21  6:19 AM   Result Value Ref Range    Sodium 137 135 - 144 mEq/L    Potassium 4.8 3.4 - 4.9 mEq/L    Chloride 101 95 - 107 mEq/L    CO2 30 20 - 31 mEq/L    Anion Gap 6 (L) 9 - 15 mEq/L    Glucose 114 (H) 70 - 99 mg/dL    BUN 23 8 - 23 mg/dL    CREATININE 0.84 0.50 - 0.90 mg/dL    GFR Non-African American >60.0 >60    GFR  >60.0 >60    Calcium 9.0 8.5 - 9.9 mg/dL   CBC    Collection Time: 02/17/21  6:19 AM   Result Value Ref Range    WBC 11.0 (H) 4.8 - 10.8 K/uL    RBC 3.69 (L) 4.20 - 5.40 M/uL    Hemoglobin 10.6 (L) 12.0 - 16.0 g/dL    Hematocrit 32.8 (L) 37.0 - 47.0 %    MCV 88.8 82.0 - 100.0 fL    MCH 28.6 27.0 - 31.3 pg    MCHC 32.2 (L) 33.0 - 37.0 %    RDW 14.5 11.5 - 14.5 %    Platelets 632 343 - 640 K/uL   EKG 12 Lead    Collection Time: 02/17/21  6:41 AM   Result Value Ref Range    Ventricular Rate 68 BPM    Atrial Rate 68 BPM    P-R Interval 208 ms    QRS Duration 140 ms    Q-T Interval 416 ms    QTc Calculation (Bazett) 442 ms    P Axis 59 degrees    R Axis -73 degrees    T Axis -60 degrees       Previous extensive, complex labs, notes and diagnostics reviewed and analyzed. ALLERGIES:    Allergies as of 02/09/2021 - Review Complete 02/09/2021   Allergen Reaction Noted    Latex  07/19/2017    Tape [adhesive tape]  06/28/2016      (please also verify by checking [facility])     Complex Physical Medicine & Rehab Issues Assess & Plan:   1. Severe abnormality of gait and mobility and impaired self-care and ADL's secondary to progressive toxic encephalopathy due to recent CHF and COPD exacerbation. Functional and medical status reassessed regarding patients ability to participate in therapies and patient found to be able to participate in  acute intensive comprehensive inpatient rehabilitation program including PT/OT to improve balance, ambulation, ADLs, and to improve the P/AROM. It is my opinion that they will be able to tolerate 3 hours of therapy a day and benefit from it at an acute level. 2. Bowel constipation and Bladder dysfunction overactive bladder:  frequent toileting, ambulate to bathroom with assistance, check post void residuals. Check for C.difficile x1 if >2 loose stools in 24 hours, continue bowel & bladder program.  Monitor for UTI symptoms including lethargy and confusion  3.  Severe mid and low back pain and generalized OA pain: reassess pain every shift and prior to and after each therapy session, give prn  Tylenol, modalities prn in therapy, consider Lidoderm, K-pad prn.   4. Skin breakdown risk:  continue pressure relief program.  Daily skin exams and reports from nursing. 5. Severe fatigue due to immobility and nutritional deficits: Add vitamin B12 vitamin D and CoQ10 titrate dosing and add protein supplementation with low carb content. 6. Complex discharge planning:   Await medical stability to consider transfer back to acute rehab versus lower level of care if patient is not able to tolerate rehab. However I think because of her medical problems she will need close monitoring from medical standpoint it would like to try to get her back to rehab if we can. She was scheduled to go to the acute medical floor status post pleurocentesis. Complex Active General Medical Issues that complicate care Assess & Plan:     1. Active Problems:    Impaired mobility and activities of daily living due to CHF exac, [facility] admit 2021    Paroxysmal atrial fibrillation (HCC)    Lumbar stenosis with neurogenic claudication    Osteoarthritis of lumbar spine with myelopathy    Hypoxia    Acute on chronic combined systolic (congestive) and diastolic (congestive) heart failure ([address]  Resolved Problems:    * No resolved hospital problems.  Mat Palumbo D.O., PM&R     Attending    [address]

## 2021-02-17 NOTE — FLOWSHEET NOTE
PM assessment completed. Patient complaining of chest pain under her left breast. Spoke with . He will see patient. Patient voices no other needs at this time. 2100: Patient repeatedly calls for nurse. Patient states that pain is still present. 2130: Patient medicated with tylenol for pain. 2230: Patient sleeping. No signs of distress noted.

## 2021-02-17 NOTE — FLOWSHEET NOTE
AM assessment completed. Patient resting in bed at this time. Denies any chest pain at this time. Remains NSR on the monitor. No edema noted. Pedal pulses palpable. Shortness of breath noted with exertion. Lungs are diminished bilaterally. SATS 98% on 6L high flow. She is A/Ox3 but is primarily [place] speaking. She can be up in the room with a 1 person assist/walker. Denies any pain with elimination. Zeng catheter to gravity drainage with suzanna colored, hazy urine. Last BM 2/16/21. Skin remains warm, dry and intact. #22g SL to left forearm, flushed and patent. Vital signs stable and AM medications provided. Also medicated with 650mg PO tylenol for complaints of 6/10 left lateral rib cage area pain. No signs of any acute distress noted. Call light remains in reach.  Electronically signed by Antonino Drew RN on 2/17/2021 at 11:17 AM

## 2021-02-17 NOTE — PROGRESS NOTES
CC: SOB   Patient is a 80 y.o. female who presents with a chief complaint of SOB. Patient is followed on a regular basis by Dr. Brooke Osei MD. Patient with past medical history of SVT status post ablation, descending thoracic aorta aneurysm measuring 4.9 cm, ascending aorta aneurysm measuring 4.3 cm, paroxysmal atrial fibrillation,History of provoked DVT, originally presented to [facility] with significant shortness of breath as well as rapid heartbeat and chest pain. Patient was noted to be in acute decompensated combined heart failure. She was started on IV diuretics. She was also developed paroxysmal atrial fibrillation started on oral anticoagulation. She underwent cardiac catheterization for elevated troponins as well as chest pain and a presentation of acute decompensated heart failure and ejection fraction of 40% was noted to have moderate mid LAD stenosis with negative IFR. Patient underwent left pleural effusion thoracentesis by interventional radiology. She was seen by pulmonary as well. She was transferred to rehab and yesterday developed worsening shortness of breath as well as O2 desaturation and transferred to [facility] for further evaluation. Repeat chest x-ray showed CHF findings and given IV Lasix. Normal sinus rhythm at 60 bpm on telemetry. 2/11/2021: Patient complains of dizziness. She is up at the side of the bed eating breakfast.  Denies any chest pain. Zeng remains in place. Renal function electrolytes are within normal limits. Blood pressure is in the 83R systolic. A. fib on telemetry heart rate in the 90's  -2.2 L total net fluid balance    2/16/2021: Patient complains of left rib/below breast area discomfort. Vague in explanation. Daughter present at bedside. Patient up in bed eating dinner. Zeng remains in place. Status post thoracentesis for left pleural effusion.  -3.4 L total net fluid balance.   A. fib on telemetry 115-100 40s, blood pressure is marginal in the 15P to 97I systolic. 2/17/2021: Patient converted to normal sinus rhythm overnight. She was initiated on IV digoxin as well as p.o. She is resting comfortably. Normal sinus rhythm at heart rate of 72 bpm on telemetry. She denies any chest pain or shortness of breath. She is on full dose anticoagulation. Zeng remains in place. Blood pressure improved.     Past Medical History        Past Medical History:   Diagnosis Date    Ascending aortic aneurysm ([address] 6/23/2017    Charcot Arleen Tooth muscular atrophy     Depression     Descending aortic aneurysm ([address] 6/23/2017    Essential hypertension 2/16/2018    Headache     HTN (hypertension)     Impaired mobility and activities of daily living     Lumbar stenosis with neurogenic claudication     Myelopathy ([address]     Osteoarthritis          Patient Active Problem List   Diagnosis    Lumbar stenosis with neurogenic claudication    Acquired scoliosis    Osteoporosis    Charcot Arleen Tooth muscular atrophy    Excess weight    Osteoarthritis of lumbar spine with myelopathy    Osteoarthritis    Myelopathy ([address]    Impaired mobility and activities of daily living due to CHF ex, [facility] admit 2021    Headache    Depression    Ascending aortic aneurysm (HCC)    Descending aortic aneurysm (HCC)    Dizziness    Shortness of breath    Essential hypertension    Acute on chronic combined systolic and diastolic CHF (congestive heart failure) (HCC)    Gait abnormality    Paroxysmal atrial fibrillation (HCC)    Pleural effusion    Hypoxia    Acute pulmonary edema (HCC)    Acute on chronic combined systolic (congestive) and diastolic (congestive) heart failure (HCC)     Past Surgical History         Past Surgical History:   Procedure Laterality Date    JOINT REPLACEMENT      THORACENTESIS Left 01/29/2021    total of 755 cc removed per Dr Kate Baker specimen sent to lab     Social History   Social History         Socioeconomic History    Marital status:      Spouse name: Not on file    Number of children: Not on file    Years of education: Not on file    Highest education level: Not on file   Occupational History    Not on file   Social Needs    Financial resource strain: Not on file    Food insecurity     Worry: Not on file     Inability: Not on file    Transportation needs     Medical: Not on file     Non-medical: Not on file   Tobacco Use    Smoking status: Never Smoker    Smokeless tobacco: Never Used   Substance and Sexual Activity    Alcohol use: No     Alcohol/week: 0.0 standard drinks    Drug use: No    Sexual activity: Not on file   Lifestyle    Physical activity     Days per week: 0 days     Minutes per session: 0 min    Stress: Only a little   Relationships    Social connections     Talks on phone: More than three times a week     Gets together: More than three times a week     Attends Latter-day service: 1 to 4 times per year     Active member of club or organization: No     Attends meetings of clubs or organizations: Never     Relationship status:      Intimate partner violence     Fear of current or ex partner: No     Emotionally abused: No     Physically abused: No     Forced sexual activity: No   Other Topics Concern    Not on file   Social History Narrative    Lives With: Alone, son lives down the street, dtr is in the area    Has a son in the area    Type of Home: [facility]  in [address]: One level    Home Access: Stairs to enter with rails- Number of Steps: 2- Rails: Both    Bathroom Shower/Tub: Tub/Shower unit, Bathroom Equipment: Grab bars in shower, Shower chair    Home Equipment: Rolling walker, Cane(Pt infrequemtly uses DME for ambulation and prefers to furniture walk in home)    ADL Assistance: Independent, [address]: Independent    Homemaking Responsibilities: Yes    Ambulation Assistance: Independent, Transfer Assistance: Independent    Additional Comments: Son stops over 2 times daily         Family History         Family History   Problem Relation Age of Onset    Arthritis Mother     Arthritis Father     High Blood Pressure Father      Current Facility-Administered Medications             Current Facility-Administered Medications   Medication Dose Route Frequency Provider Last Rate Last Admin    citalopram (CELEXA) tablet 20 mg 20 mg Oral Daily Jarod Ham MD      levothyroxine (SYNTHROID) tablet 88 mcg 88 mcg Oral Daily Jarod Ham MD  88 mcg at 02/10/21 1813    aspirin chewable tablet 81 mg 81 mg Oral Daily Jarod Ham MD      vitamin B-12 (CYANOCOBALAMIN) tablet 1,000 mcg 1,000 mcg Oral Daily Jarod Ham MD      [facility]IAZem [facility]) extended release capsule 120 mg 120 mg Oral BID Jarod Ham MD      sodium chloride flush 0.9 % injection 10 mL 10 mL Intravenous 2 times per day Jarod Ham MD  10 mL at 02/09/21 2130    sodium chloride flush 0.9 % injection 10 mL 10 mL Intravenous PRN Jarod Ham MD      promethazine (PHENERGAN) tablet 12.5 mg 12.5 mg Oral Q6H PRN Jarod Ham MD      Or    [facility] injection 4 mg 4 mg Intravenous Q6H PRN Jarod Ham MD      polyethylene glycol [facility]) packet 17 g 17 g Oral Daily PRN Jarod Ham MD      acetaminophen (TYLENOL) tablet 650 mg 650 mg Oral Q6H PRN Jarod Ham MD      Or    acetaminophen (TYLENOL) suppository 650 mg 650 mg Rectal Q6H PRN Jarod Ham MD      enoxaparin (LOVENOX) injection 40 mg 40 mg Subcutaneous Daily Jarod Ham MD      furosemide (LASIX) injection 20 mg 20 mg Intravenous BID Jarod Ham MD     ALLERGIES: Latex and Tape [place] tape]   Review of Systems   Constitutional: Negative. Negative for chills and fever. HENT: Negative. Eyes: Negative. Respiratory: Positive for shortness of breath. Negative for wheezing. Cardiovascular: Positive for chest pain.  Negative for palpitations and leg swelling. Gastrointestinal: Negative. Negative for abdominal pain, nausea and vomiting. Endocrine: Negative. Genitourinary: Negative. Musculoskeletal: Negative. Skin: Negative. Negative for rash. Allergic/Immunologic: Negative. Neurological: Negative for dizziness, weakness and headaches. Hematological: Negative. Psychiatric/Behavioral: Negative. VITALS:   Blood pressure 117/71, pulse 69, temperature 98.2 °F (36.8 °C), weight 163 lb 12.8 oz (74.3 kg), SpO2 96 %. Body mass index is 29.02 kg/m². Physical Exam   Constitutional: She is oriented to person, place, and time. She appears well-developed and well-nourished. HENT:   Head: Normocephalic and atraumatic. Eyes: Pupils are equal, round, and reactive to light. Neck: Normal range of motion. Neck supple. No JVD present. No tracheal deviation present. No thyromegaly present. Cardiovascular: Normal rate, regular rhythm, normal heart sounds and intact distal pulses. PMI is not displaced. Exam reveals no gallop, no S3, no distant heart sounds and no friction rub. No murmur heard. Pulmonary/Chest: No respiratory distress. She has no wheezes. She has no rales. She exhibits no tenderness. Abdominal: Soft. Bowel sounds are normal. She exhibits no distension and no mass. There is no abdominal tenderness. There is no rebound and no guarding. Musculoskeletal:   General: No edema. Neurological: She is alert and oriented to person, place, and time. No cranial nerve deficit. Skin: Skin is warm and dry. No rash noted. She is not diaphoretic. No erythema. No pallor. Psychiatric: She has a normal mood and affect.  Her behavior is normal. Judgment and thought content normal.     LABS:   Recent Results         Recent Results (from the past 24 hour(s))   COVID-19    Collection Time: 02/09/21 3:33 PM   Result Value Ref Range    SARS-CoV-2, NAAT Not Detected Not Detected   EKG 12 Lead    Collection Time: 02/10/21 12:51 AM   Result Value Ref Range    Ventricular Rate 70 BPM    Atrial Rate 70 BPM    P-R Interval 204 ms    QRS Duration 150 ms    Q-T Interval 466 ms    QTc Calculation (Bazett) 503 ms    P Axis 59 degrees    R Axis -61 degrees    T Axis -59 degrees   Basic Metabolic Panel    Collection Time: 02/10/21 5:47 AM   Result Value Ref Range    Sodium 137 135 - 144 mEq/L    Potassium 4.0 3.4 - 4.9 mEq/L    Chloride 99 95 - 107 mEq/L    CO2 27 20 - 31 mEq/L    Anion Gap 11 9 - 15 mEq/L    Glucose 134 (H) 70 - 99 mg/dL    BUN 15 8 - 23 mg/dL    CREATININE 0.81 0.50 - 0.90 mg/dL    GFR Non-African American >60.0 >60    GFR  >60.0 >60    Calcium 8.5 8.5 - 9.9 mg/dL   Magnesium    Collection Time: 02/10/21 5:47 AM   Result Value Ref Range    Magnesium 1.7 1.7 - 2.4 mg/dL   Lipid panel - fasting    Collection Time: 02/10/21 5:47 AM   Result Value Ref Range    Cholesterol, Total 123 0 - 199 mg/dL    Triglycerides 78 0 - 150 mg/dL    HDL 39 (L) 40 - 59 mg/dL    LDL Calculated 68 0 - 129 mg/dL   Troponin:         Lab Results   Component Value Date    TROPONINI <0.010 01/26/2021     EKG: normal sinus rhythm, nonspecific ST and T waves changes       ASSESSMENT:   Acute on chronic decompensated combined congestive heart failure. -Improved  NICMP EF of 40%. Paroxysmal atrial fibrillation-sinus rhythm today. History of coronary disease- Mod LAD disease, negative IFR. History of SVT status post ablation   History of ascending and descending thoracic aortic aneurysm-stable   Essential hypertension   Left pleural effusion status post repeat thoracentesis. PLAN:   1. As always, aggressive risk factor modification is strongly recommended. We should adhere to the JNC VIII guidelines for HTN management and the NCEPATP III guidelines for LDL-C management. 2. Maintain taylor./Urology recommendation  3. PO lasix given low BP/dizziness, monitor I's and O's and renal function. 4. Max cardiac meds-holding ARB, CCB due to hypotension.   Continue with Coreg 3.125 mg twice daily. 5. Continue with p.o. digoxin given history of paroxysmal A. fib with RVR and hypotension. 6. Continue with Xarelto  7. Pulmonary recommendations  8. CHF teaching and follow up in CHF clinic post discharge  9. Low sodium diet. 10. Urology evaluation/recs regarding urinary retention. 11. Likely stable to discharge/transfer to rehab.         Electronically signed by Paula Donald DO on 2/17/2021 at 2:30 PM

## 2021-02-17 NOTE — CARE COORDINATION
Per [employer], [address], 1. [address] remains following the patient. Luke Orozco, 2. [employer] and 3. [employer] remain following.  Electronically signed by JACKLYN Tesfaye on 2/17/21 at 3:24 PM EST

## 2021-02-17 NOTE — PROGRESS NOTES
CC: SOB   Patient is a 80 y.o. female who presents with a chief complaint of SOB. Patient is followed on a regular basis by Dr. Christina Renner MD. Patient with past medical history of SVT status post ablation, descending thoracic aorta aneurysm measuring 4.9 cm, ascending aorta aneurysm measuring 4.3 cm, paroxysmal atrial fibrillation,History of provoked DVT, originally presented to [facility] with significant shortness of breath as well as rapid heartbeat and chest pain. Patient was noted to be in acute decompensated combined heart failure. She was started on IV diuretics. She was also developed paroxysmal atrial fibrillation started on oral anticoagulation. She underwent cardiac catheterization for elevated troponins as well as chest pain and a presentation of acute decompensated heart failure and ejection fraction of 40% was noted to have moderate mid LAD stenosis with negative IFR. Patient underwent left pleural effusion thoracentesis by interventional radiology. She was seen by pulmonary as well. She was transferred to rehab and yesterday developed worsening shortness of breath as well as O2 desaturation and transferred to [facility] for further evaluation. Repeat chest x-ray showed CHF findings and given IV Lasix. Normal sinus rhythm at 60 bpm on telemetry. 2/11/2021: Patient complains of dizziness. She is up at the side of the bed eating breakfast.  Denies any chest pain. Zeng remains in place. Renal function electrolytes are within normal limits. Blood pressure is in the 20A systolic. A. fib on telemetry heart rate in the 90's  -2.2 L total net fluid balance    2/16/2021: Patient complains of left rib/below breast area discomfort. Vague in explanation. Daughter present at bedside. Patient up in bed eating dinner. Zeng remains in place. Status post thoracentesis for left pleural effusion.  -3.4 L total net fluid balance.   A. fib on telemetry 115-100 40s, blood pressure is marginal in the 23C to 24X systolic. Past Medical History        Past Medical History:   Diagnosis Date    [address]ending aortic aneurysm [address] 6/23/2017    Charcot Arleen Tooth muscular atrophy     Depression     Descending aortic aneurysm ([address] 6/23/2017    Essential hypertension 2/16/2018    Headache     HTN (hypertension)     Impaired mobility and activities of daily living     Lumbar stenosis with neurogenic claudication     Myelopathy ([address]     Osteoarthritis          Patient Active Problem List   Diagnosis    Lumbar stenosis with neurogenic claudication    Acquired scoliosis    Osteoporosis    Charcot Arleen Tooth muscular atrophy    Excess weight    Osteoarthritis of lumbar spine with myelopathy    Osteoarthritis    Myelopathy ([address]    Impaired mobility and activities of daily living due to CHF exac, [facility] admit 2021    Headache    Depression    Ascending aortic aneurysm (HCC)    Descending aortic aneurysm (HCC)    Dizziness    Shortness of breath    Essential hypertension    Acute on chronic combined systolic and diastolic CHF (congestive heart failure) (HCC)    Gait abnormality    Paroxysmal atrial fibrillation (HCC)    Pleural effusion    Hypoxia    Acute pulmonary edema (HCC)    Acute on chronic combined systolic (congestive) and diastolic (congestive) heart failure (HCC)     Past Surgical History         Past Surgical History:   Procedure Laterality Date    JOINT REPLACEMENT      THORACENTESIS Left 01/29/2021    total of 755 cc removed per Dr Chin Cortez specimen sent to lab     Social History   Social History         Socioeconomic History    Marital status:       Spouse name: Not on file    Number of children: Not on file    Years of education: Not on file    Highest education level: Not on file   Occupational History    Not on file   Social Needs    Financial resource strain: Not on file    Food insecurity     Worry: Not on file     Inability: Not on file   [facility] Transportation needs     Medical: Not on file     Non-medical: Not on file   Tobacco Use    Smoking status: Never Smoker    Smokeless tobacco: Never Used   Substance and Sexual Activity    Alcohol use: No     Alcohol/week: 0.0 standard drinks    Drug use: No    Sexual activity: Not on file   Lifestyle    Physical activity     Days per week: 0 days     Minutes per session: 0 min    Stress: Only a little   Relationships    Social connections     Talks on phone: More than three times a week     Gets together: More than three times a week     Attends Jewish service: 1 to 4 times per year     Active member of club or organization: No     Attends meetings of clubs or organizations: Never     Relationship status:      Intimate partner violence     Fear of current or ex partner: No     Emotionally abused: No     Physically abused: No     Forced sexual activity: No   Other Topics Concern    Not on file   Social History Narrative    Lives With: Alone, son lives down the street, dtr is in the area    Has a son in the area    Type of Home: [address]: One level    Home Access: Stairs to enter with rails- Number of Steps: 2- Rails: Both    Bathroom Shower/Tub: Tub/Shower unit, Bathroom Equipment: Grab bars in shower, Shower chair    Home Equipment: Rolling walker, Cane(Pt infrequemtly uses DME for ambulation and prefers to furniture walk in home)    ADL Assistance: [address], [address]: Independent    Homemaking Responsibilities: Yes    Ambulation Assistance: Independent, Transfer Assistance: Independent    Additional Comments: Son stops over 2 times daily         Family History         Family History   Problem Relation Age of Onset    Arthritis Mother     Arthritis Father     High Blood Pressure Father      Current Facility-Administered Medications             Current Facility-Administered Medications   Medication Dose Route Frequency Provider Last Rate Negative. Psychiatric/Behavioral: Negative. VITALS:   Blood pressure 117/71, pulse 69, temperature 98.2 °F (36.8 °C), weight 163 lb 12.8 oz (74.3 kg), SpO2 96 %. Body mass index is 29.02 kg/m². Physical Exam   Constitutional: She is oriented to person, place, and time. She appears well-developed and well-nourished. HENT:   Head: Normocephalic and atraumatic. Eyes: Pupils are equal, round, and reactive to light. Neck: Normal range of motion. Neck supple. No JVD present. No tracheal deviation present. No thyromegaly present. Cardiovascular: Normal rate, regular rhythm, normal heart sounds and intact distal pulses. PMI is not displaced. Exam reveals no gallop, no S3, no distant heart sounds and no friction rub. No murmur heard. Pulmonary/Chest: No respiratory distress. She has no wheezes. She has no rales. She exhibits no tenderness. Abdominal: Soft. Bowel sounds are normal. She exhibits no distension and no mass. There is no abdominal tenderness. There is no rebound and no guarding. Musculoskeletal:   General: No edema. Neurological: She is alert and oriented to person, place, and time. No cranial nerve deficit. Skin: Skin is warm and dry. No rash noted. She is not diaphoretic. No erythema. No pallor. Psychiatric: She has a normal mood and affect.  Her behavior is normal. Judgment and thought content normal.     LABS:   Recent Results         Recent Results (from the past 24 hour(s))   COVID-19    Collection Time: 02/09/21 3:33 PM   Result Value Ref Range    SARS-CoV-2, NAAT Not Detected Not Detected   EKG 12 Lead    Collection Time: 02/10/21 12:51 AM   Result Value Ref Range    Ventricular Rate 70 BPM    Atrial Rate 70 BPM    P-R Interval 204 ms    QRS Duration 150 ms    Q-T Interval 466 ms    QTc Calculation (Bazett) 503 ms    P Axis 59 degrees    R Axis -61 degrees    T Axis -59 degrees   Basic Metabolic Panel    Collection Time: 02/10/21 5:47 AM   Result Value Ref Range    Sodium 137 135 - 144 mEq/L    Potassium 4.0 3.4 - 4.9 mEq/L    Chloride 99 95 - 107 mEq/L    CO2 27 20 - 31 mEq/L    Anion Gap 11 9 - 15 mEq/L    Glucose 134 (H) 70 - 99 mg/dL    BUN 15 8 - 23 mg/dL    CREATININE 0.81 0.50 - 0.90 mg/dL    GFR Non-African American >60.0 >60    GFR  >60.0 >60    Calcium 8.5 8.5 - 9.9 mg/dL   Magnesium    Collection Time: 02/10/21 5:47 AM   Result Value Ref Range    Magnesium 1.7 1.7 - 2.4 mg/dL   Lipid panel - fasting    Collection Time: 02/10/21 5:47 AM   Result Value Ref Range    Cholesterol, Total 123 0 - 199 mg/dL    Triglycerides 78 0 - 150 mg/dL    HDL 39 (L) 40 - 59 mg/dL    LDL Calculated 68 0 - 129 mg/dL   Troponin:         Lab Results   Component Value Date    TROPONINI <0.010 01/26/2021     EKG: normal sinus rhythm, nonspecific ST and T waves changes       ASSESSMENT:   Acute on chronic decompensated combined congestive heart failure. NICMP EF of 40%. Paroxysmal atrial fibrillation- afib today. History of coronary disease- Mod LAD disease, negative IFR. History of SVT status post ablation   History of ascending and descending thoracic aortic aneurysm-stable   Essential hypertension   Left pleural effusion status post thoracentesis. PLAN:   1. As always, aggressive risk factor modification is strongly recommended. We should adhere to the JNC VIII guidelines for HTN management and the NCEPATP III guidelines for LDL-C management. 2. Maintain taylor./Urology recommendation  3. PO lasix given low BP/dizziness, monitor I's and O's and renal function. 4. Max cardiac meds-holding ARB, CCB and BB due to hypotension. 5. Start digoxin IV loading then p.o. given A. fib with RVR and hypotension. 6. Continue with Xarelto  7. Pulmonary recommendations  8. CHF teaching and follow up in CHF clinic post discharge  9. Low sodium diet. 10. Urology evaluation/recs regarding urinary retention.          Electronically signed by Narendra Chatman DO on 2/16/2021 at 7:04 PM

## 2021-02-17 NOTE — FLOWSHEET NOTE
Patient assisted to the bedside commode and then back to bed. Stated she was dizzy once back in the bed. /47 which is lower than this morning. Told patient to close her eyes for a minute in which she stated the dizziness went away. Call light remains in reach.  Electronically signed by Laya Valdez RN on 2/17/2021 at 1:53 PM

## 2021-02-17 NOTE — CONSULTS
CC: Shortness of breath    HPI: Patient is [place] speaking,  services were used. Patient is a pleasant 42-year-old woman with a left-sided pleural effusion. Patient has a history of supraventricular tachycardia, descending thoracic aortic aneurysm, ascending aortic aneurysm. Atrial fibrillation, provoked DVT in the past, she originally presented to [address] [address] with a complaint of chest pain. She had already presented with left-sided pleural effusion and a thoracentesis was performed. Today, she has reaccumulated left pleural fluid, and interventional radiology was consulted for a diagnostic and therapeutic thoracentesis. Family History   Problem Relation Age of Onset    Arthritis Mother     Arthritis Father     High Blood Pressure Father        Past Surgical History:   Procedure Laterality Date    JOINT REPLACEMENT      THORACENTESIS Left 01/29/2021    total of 755 cc removed per Dr Jenene Councilman specimen sent to lab        Past Medical History:   Diagnosis Date    Ascending aortic aneurysm ([facility] 6/23/2017    Charcot Arleen Tooth muscular atrophy     Depression     Descending aortic aneurysm ([facility] 6/23/2017    Essential hypertension 2/16/2018    Headache     HTN (hypertension)     Impaired mobility and activities of daily living     Lumbar stenosis with neurogenic claudication     Myelopathy ([facility] [address] [facility]     Osteoarthritis        Social History     Socioeconomic History    Marital status:       Spouse name: Not on file    Number of children: Not on file    Years of education: Not on file    Highest education level: Not on file   Occupational History    Not on file   Social Needs    Financial resource strain: Not on file    Food insecurity     Worry: Not on file     Inability: Not on file    Transportation needs     Medical: Not on file     Non-medical: Not on file   Tobacco Use    Smoking status: Never Smoker    Smokeless tobacco: Never Used   Substance and Sexual Activity    Alcohol use: No     Alcohol/week: 0.0 standard drinks    Drug use: No    Sexual activity: Not on file   Lifestyle    Physical activity     Days per week: 0 days     Minutes per session: 0 min    Stress: Only a little   Relationships    Social connections     Talks on phone: More than three times a week     Gets together: More than three times a week     Attends Zoroastrianism service: 1 to 4 times per year     Active member of club or organization: No     Attends meetings of clubs or organizations: Never     Relationship status:     Intimate partner violence     Fear of current or ex partner: No     Emotionally abused: No     Physically abused: No     Forced sexual activity: No   Other Topics Concern    Not on file   Social History Narrative    Lives With: Alone, son lives down the street, dtr is in the area    Has a son in the area    Type of Home[place] [address]: One level    Home Access: Stairs to enter with rails- Number of Steps: 2- Rails: Both    Bathroom Shower/Tub: Tub/Shower unit, Bathroom Equipment: Grab bars in shower, Shower chair    Home Equipment: Rolling walker, Cane(Pt infrequemtly uses DME for ambulation and prefers to furniture walk in home)    ADL Assistance: Independent, [address]: Independent    Homemaking Responsibilities: Yes    Ambulation Assistance: Independent, Transfer Assistance: Independent    Additional Comments: Son stops over 2 times daily           Allergies   Allergen Reactions    Latex     Tape [Adhesive Tape]        No current facility-administered medications on file prior to encounter.       Current Outpatient Medications on File Prior to Encounter   Medication Sig Dispense Refill    dilTIAZem (CARDIZEM CD) 120 MG extended release capsule Take 1 capsule by mouth 2 times daily 60 capsule 1    Boswellia-Glucosamine-Vit D (OSTEO BI-FLEX ONE PER DAY) TABS Take by mouth daily      Calcium Carbonate-Vitamin D (CALTRATE 600+D PO) Take by mouth daily      Multiple Vitamins-Minerals (CENTRUM SILVER ULTRA WOMENS) TABS Take by mouth daily      vitamin B-12 (CYANOCOBALAMIN) 1000 MCG tablet Take 1,000 mcg by mouth daily      KLOR-CON M20 20 MEQ extended release tablet TAKE 1 TABLET DAILY WITH BREAKFAST (MUST CALL OFFICE FOR FOLLOW UP) 90 tablet 3    hydrALAZINE (APRESOLINE) 10 MG tablet TAKE 1 TABLET IN THE EVENING. repeat dose IN THE MORNING if systolic >034  5    aspirin 81 MG tablet Take 81 mg by mouth daily       losartan (COZAAR) 50 MG tablet Take 50 mg by mouth 2 times daily       citalopram (CELEXA) 20 MG tablet Take 20 mg by mouth daily       SYNTHROID 88 MCG tablet Take 88 mcg by mouth daily          Review of Systems   Constitutional: Positive for fatigue. Negative for chills, diaphoresis and fever. HENT: Negative. Negative for congestion, ear pain, hearing loss and tinnitus. Eyes: Negative. Negative for photophobia. Respiratory: Positive for chest tightness and shortness of breath. Negative for cough and wheezing. Cardiovascular: Positive for chest pain. Negative for palpitations and leg swelling. Gastrointestinal: Negative. Negative for abdominal pain, constipation, diarrhea, nausea and vomiting. Genitourinary: Negative. Negative for dysuria, flank pain, frequency, hematuria and urgency. Musculoskeletal: Negative. Negative for back pain and neck pain. Skin: Negative. Negative for rash. Allergic/Immunologic: Negative for environmental allergies. Neurological: Positive for weakness. Negative for dizziness, tremors and headaches. Hematological: Does not bruise/bleed easily. Psychiatric/Behavioral: Negative. Negative for hallucinations and suicidal ideas. The patient is not nervous/anxious.         OBJECTIVE:  BP (!) 95/54   Pulse 103   Temp 98.4 °F (36.9 °C) (Oral)   Resp 20   Ht 5' 3\" (1.6 m)   Wt 161 lb 13.1 oz (73.4 kg)   SpO2 94%   BMI 28.66 kg/m²     Physical Exam  Pulmonary: Breath sounds: Examination of the left-lower field reveals decreased breath sounds and rhonchi. Decreased breath sounds and rhonchi present. ASSESSMENT ANDPLAN:    Assessment: Patient with complaint of chest pain, and shortness of breath and chest tightness. With a history of congestive heart failure. Presents with reaccumulation of left pleural effusion. This may represent decompensation of congestive heart failure, though imaging does demonstrate increased opacity in the left lower lobe, also may represent an infectious process, though underlying malignancy cannot be excluded.     Plan: Diagnostic and therapeutic ultrasound-guided left-sided thoracentesis    Constantino Lazaro MD, Milind Joshi

## 2021-02-17 NOTE — PROGRESS NOTES
[facility] MED SURG TREATMENT NOTE     Date: 2021  Patient Name: Fabián Salgado        MRN: [id number]  Account: [de-identified]   : 1932  (80 y.o.)  Room: [place] [address]    Chart Review:  Diagnosis:  There were no encounter diagnoses. Restrictions:    Restrictions/Precautions  Restrictions/Precautions: Fall Risk         Subjective:  Patient states: \"Therapy? \"  Pain:  Start of tx:  Pre Treatment Pain Screening  Pain at present: 0  Scale Used: Numeric Score  Intervention List: Patient able to continue with treatment  Comments / Details: Pt states she had pain earlier but it has improved    End of tx:  Pain Assessment  Patient Currently in Pain: Denies  Pain Assessment: 0-10  Pain Level: 0    Objective:  Pt completed sponge bath ADL seated EOB at the levels below    ADL:  ADL  Feeding: None  Grooming: Setup  UE Bathing: Supervision  LE Bathing: Minimal assistance(feet)  UE Dressing: Unable to assess(comment)(gown)  LE Dressing: Minimal assistance(socks only)  Toileting: Unable to assess(comment)(brandon, pt did not need to have a BM)  Additional Comments: pt completed sponge bath ADL EOB  Toilet Transfers  Toilet - Technique: Ambulating  Equipment Used: Grab bars  Toilet Transfer: Unable to assess  Toilet Transfers Comments: pt did not need to go  Tub Transfers  Tub Transfers: Not tested  Shower Transfers  Shower Transfers: Not tested  [employer] Transfers Comments: Pt completed sponge bath ADL    Therapy key for assistance levels -   Independent = Pt. is able to perform task with no assistance but may require a device   Stand by assistance = Pt. does not perform task at an independent level but does not need physical assistance, requires verbal cues  Minimal, Moderate, Maximal Assistance = Pt. requires physical assistance (25%, 50%, 75% assist from helper) for task but is able to actively participate in task   Dependent = Pt. requires total assistance with task and is not able to actively participate with task completion    Functional Balance:  Balance  Sitting Balance: Supervision  Standing Balance: Supervision   Functional Mobility  Functional - Mobility Device: Rolling Walker  Activity: Other  Assist Level: Contact guard assistance  Functional Mobility Comments: Verbal cues required for safety awareness    Cognition:  Cognition  Overall Cognitive Status: WFL  Arousal/Alertness: Appropriate responses to stimuli  Following Commands: Follows all commands without difficulty  Attention Span: Appears intact  Memory: (Unable to assess d/t language barrier)  Safety Judgement: Good awareness of safety precautions  Problem Solving: Assistance required to identify errors made  Insights: Decreased awareness of deficits  Initiation: Requires cues for some  Sequencing: Requires cues for some  Cognition Comment: Some limitations d/t language barrier but appears WFL    Bed Mobility  Bed mobility  Supine to Sit: Supervision  Sit to Supine: Stand by assistance  Scooting: Supervision  Comment: HOB flat, bed rail utilized    Treatment consisted of:  ADL Training    Assessment/Discharge Disposition:     Performance deficits / Impairments: Decreased functional mobility , Decreased balance, Decreased ADL status, Decreased high-level IADLs, Decreased endurance, Decreased strength, Decreased posture, Decreased safe awareness  Prognosis: Good  Discharge Recommendations: Continue to assess pending progress  History: Pt's medical history is moderately complex  Exam: 8 perf deficits  Assistance / Modification: Pt. requires min A      6-Click  How much help for putting on and taking off regular lower body clothing?: A Little  How much help for Bathing?: A Little  How much help for Toileting? : (pt did not need to go)  How much help for putting on and taking off regular upper body clothing?: (gown)  How much help for taking care of personal grooming?: A Little  How much help for eating meals?: (unable to assess)  AM-PAC Inpatient Daily Activity Raw Score: 19  AM-PAC Inpatient ADL T-Scale Score : 40.22  ADL Inpatient CMS 0-100% Score: 42.8    Plan:  Continue OT per POC    Goals/Plan:    Improve ADL [place]    Minutes:    OT Individual Minutes  Time In: 768  Time Out: 64  Minutes: 22    ADL trainin minutes    Electronically signed by:    Sid Velasco  2021, 9:44 AM

## 2021-02-17 NOTE — FLOWSHEET NOTE
Spoke with Dr Laura Gamboa via perfect serve regarding pt BP. No new orders received.  Electronically signed by Ian Mathis RN on 2/17/2021 at 3:21 PM

## 2021-02-18 LAB
ANION GAP SERPL CALCULATED.3IONS-SCNC: 8 MEQ/L (ref 9–15)
BUN BLDV-MCNC: 21 MG/DL (ref 8–23)
CALCIUM SERPL-MCNC: 8.7 MG/DL (ref 8.5–9.9)
CHLORIDE BLD-SCNC: 100 MEQ/L (ref 95–107)
CO2: 27 MEQ/L (ref 20–31)
CREAT SERPL-MCNC: 0.76 MG/DL (ref 0.5–0.9)
GFR AFRICAN AMERICAN: >60
GFR NON-AFRICAN AMERICAN: >60
GLUCOSE BLD-MCNC: 131 MG/DL (ref 70–99)
HCT VFR BLD CALC: 29.8 % (ref 37–47)
HEMOGLOBIN: 9.7 G/DL (ref 12–16)
MCH RBC QN AUTO: 28.5 PG (ref 27–31.3)
MCHC RBC AUTO-ENTMCNC: 32.7 % (ref 33–37)
MCV RBC AUTO: 87.3 FL (ref 82–100)
PDW BLD-RTO: 13.9 % (ref 11.5–14.5)
PLATELET # BLD: 286 K/UL (ref 130–400)
POTASSIUM SERPL-SCNC: 4.2 MEQ/L (ref 3.4–4.9)
PRO-BNP: 1665 PG/ML
RBC # BLD: 3.41 M/UL (ref 4.2–5.4)
SODIUM BLD-SCNC: 135 MEQ/L (ref 135–144)
WBC # BLD: 11.5 K/UL (ref 4.8–10.8)

## 2021-02-18 PROCEDURE — 94640 AIRWAY INHALATION TREATMENT: CPT

## 2021-02-18 PROCEDURE — 2580000003 HC RX 258: Performed by: FAMILY MEDICINE

## 2021-02-18 PROCEDURE — 6370000000 HC RX 637 (ALT 250 FOR IP): Performed by: INTERNAL MEDICINE

## 2021-02-18 PROCEDURE — 83880 ASSAY OF NATRIURETIC PEPTIDE: CPT

## 2021-02-18 PROCEDURE — 99232 SBSQ HOSP IP/OBS MODERATE 35: CPT | Performed by: INTERNAL MEDICINE

## 2021-02-18 PROCEDURE — 6370000000 HC RX 637 (ALT 250 FOR IP): Performed by: FAMILY MEDICINE

## 2021-02-18 PROCEDURE — 99231 SBSQ HOSP IP/OBS SF/LOW 25: CPT | Performed by: PHYSICAL MEDICINE & REHABILITATION

## 2021-02-18 PROCEDURE — 97535 SELF CARE MNGMENT TRAINING: CPT

## 2021-02-18 PROCEDURE — 94150 VITAL CAPACITY TEST: CPT

## 2021-02-18 PROCEDURE — 2700000000 HC OXYGEN THERAPY PER DAY

## 2021-02-18 PROCEDURE — 94761 N-INVAS EAR/PLS OXIMETRY MLT: CPT

## 2021-02-18 PROCEDURE — 85027 COMPLETE CBC AUTOMATED: CPT

## 2021-02-18 PROCEDURE — 97116 GAIT TRAINING THERAPY: CPT

## 2021-02-18 PROCEDURE — 36415 COLL VENOUS BLD VENIPUNCTURE: CPT

## 2021-02-18 PROCEDURE — 2060000000 HC ICU INTERMEDIATE R&B

## 2021-02-18 PROCEDURE — 94760 N-INVAS EAR/PLS OXIMETRY 1: CPT

## 2021-02-18 PROCEDURE — 80048 BASIC METABOLIC PNL TOTAL CA: CPT

## 2021-02-18 RX ADMIN — MIDODRINE HYDROCHLORIDE 5 MG: 5 TABLET ORAL at 16:54

## 2021-02-18 RX ADMIN — MIDODRINE HYDROCHLORIDE 5 MG: 5 TABLET ORAL at 12:27

## 2021-02-18 RX ADMIN — Medication 10 ML: at 08:18

## 2021-02-18 RX ADMIN — CARVEDILOL 3.12 MG: 3.12 TABLET, FILM COATED ORAL at 20:38

## 2021-02-18 RX ADMIN — CYANOCOBALAMIN TAB 500 MCG 1000 MCG: 500 TAB at 08:17

## 2021-02-18 RX ADMIN — BUDESONIDE AND FORMOTEROL FUMARATE DIHYDRATE 2 PUFF: 160; 4.5 AEROSOL RESPIRATORY (INHALATION) at 07:32

## 2021-02-18 RX ADMIN — ASPIRIN 81 MG: 81 TABLET, CHEWABLE ORAL at 08:17

## 2021-02-18 RX ADMIN — CITALOPRAM HYDROBROMIDE 20 MG: 20 TABLET ORAL at 08:17

## 2021-02-18 RX ADMIN — PANTOPRAZOLE SODIUM 40 MG: 40 TABLET, DELAYED RELEASE ORAL at 16:54

## 2021-02-18 RX ADMIN — FUROSEMIDE 20 MG: 20 TABLET ORAL at 08:17

## 2021-02-18 RX ADMIN — CARVEDILOL 3.12 MG: 3.12 TABLET, FILM COATED ORAL at 08:17

## 2021-02-18 RX ADMIN — PANTOPRAZOLE SODIUM 40 MG: 40 TABLET, DELAYED RELEASE ORAL at 05:35

## 2021-02-18 RX ADMIN — RIVAROXABAN 15 MG: 15 TABLET, FILM COATED ORAL at 16:54

## 2021-02-18 RX ADMIN — LEVOTHYROXINE SODIUM 88 MCG: 0.09 TABLET ORAL at 05:35

## 2021-02-18 RX ADMIN — POTASSIUM CHLORIDE 20 MEQ: 20 TABLET, EXTENDED RELEASE ORAL at 08:17

## 2021-02-18 RX ADMIN — BUDESONIDE AND FORMOTEROL FUMARATE DIHYDRATE 2 PUFF: 160; 4.5 AEROSOL RESPIRATORY (INHALATION) at 19:40

## 2021-02-18 RX ADMIN — DIGOXIN 125 MCG: 125 TABLET ORAL at 08:17

## 2021-02-18 RX ADMIN — MIDODRINE HYDROCHLORIDE 5 MG: 5 TABLET ORAL at 08:17

## 2021-02-18 RX ADMIN — Medication 10 ML: at 20:38

## 2021-02-18 ASSESSMENT — PAIN SCALES - GENERAL
PAINLEVEL_OUTOF10: 0
PAINLEVEL_OUTOF10: 0

## 2021-02-18 NOTE — PROGRESS NOTES
Physical Therapy Med Surg Daily Treatment Note  Facility/Department: [facility]  [facility]       NAME: Amanda Mohr  : 1932 (80 y.o.)  MRN: [id number]  CODE STATUS: Full Code    Date of Service: 2021    Patient Diagnosis(es): Acute on chronic combined systolic (congestive) and diastolic (congestive) heart failure ([address] [I50.43]   No chief complaint on file.     Patient Active Problem List    Diagnosis Date Noted    Paroxysmal atrial fibrillation [facility])      Priority: High    Impaired mobility and activities of daily living due to CHF [facility] admit       Priority: High    Lumbar stenosis with neurogenic claudication 2016     Priority: Medium    Acute on chronic combined systolic (congestive) and diastolic (congestive) heart failure (HCC) 2021    Pleural effusion 2021    Hypoxia     Acute pulmonary edema (HCC)     Gait abnormality 2021    Acute on chronic combined systolic and diastolic CHF (congestive heart failure) ([address] 2021    Essential hypertension 2018    Shortness of breath     Dizziness     Ascending aortic aneurysm (HCC) 2017    Descending aortic aneurysm ([address] 2017    Osteoarthritis     Myelopathy ([address]     Headache     Depression     Acquired scoliosis 2016    Osteoporosis 2016    Charcot Arleen Tooth muscular atrophy 2016    Excess weight 2016    Osteoarthritis of lumbar spine with myelopathy 2016        Past Medical History:   Diagnosis Date    Ascending aortic aneurysm ([address] 2017    Charcot Arleen Tooth muscular atrophy     Depression     Descending aortic aneurysm ([address] 2017    Essential hypertension 2018    Headache     HTN (hypertension)     Impaired mobility and activities of daily living     Lumbar stenosis with neurogenic claudication     Myelopathy ([address]     Osteoarthritis      Past Surgical History:   Procedure Laterality Date  JOINT REPLACEMENT      THORACENTESIS Left 01/29/2021    total of 755 cc removed per Dr Amie Bonner specimen sent to lab       Restrictions  Restrictions/Precautions: Fall Risk    SUBJECTIVE   General  Chart Reviewed: Yes  Subjective  Subjective: \"i'm good, we do exercise? \"  General Comment  Comments: agreeable to tx. daughter present. Pre-Session Pain Report     Pain Screening  Patient Currently in Pain: Denies       Post-Session Pain Report  Pain Assessment  Pain Assessment: 0-10  Pain Level: 0         OBJECTIVE   Orientation  Overall Orientation Status: Within Functional Limits    Bed mobility  Supine to Sit: Minimal assistance    Transfers  Sit to Stand: Stand by assistance;Contact guard assistance  Stand to sit: Contact guard assistance;Stand by assistance  Comment: tends to pull to stand on ww. Ambulation  Ambulation?: Yes  Ambulation 1  Surface: level tile  Device: Rolling Walker  Other Apparatus: O2  Assistance: Contact guard assistance;Stand by assistance  Quality of Gait: mild trunk flexion, steppage gt pattern to clear floor  Gait Deviations: Slow Lea; Increased JAMES  Distance: 30 feet x 2 with turns    Stairs/Curb  Stairs?: No        ASSESSMENT pt tolerated session well and did not present with sob. Is on 4 liters of oxygen and was at 97% after ambulation. Discharge Recommendations:  Continue to assess pending progress    Goals  Long term goals  Long term goal 1: Pt will perform bed mobility with indep  Long term goal 2: Pt will perform functional transfers with indep  Long term goal 3: Pt will ambulate >/=50ft with 2ww and mod I  Long term goal 4: Pt will negotiate 4 steps with handrail and SBA    PLAN    Times per week: 3-6        AMPAC (6 CLICK) BASIC MOBILITY  AM-PAC Inpatient Mobility Raw Score : 17     Therapy Time   Individual   Time In 0945   Time Out 1008   Minutes 23   8 minutes gt  15 minutes bed mob/transfer activity.            Leila Patricia PTA, 02/18/21 at 10:14 AM Definitions for assistance levels  Independent = pt does not require any physical supervision or assistance from another person for activity completion. Device may be needed.   Stand by assistance = pt requires verbal cues or instructions from another person, close to but not touching, to perform the activity  Minimal assistance= pt performs 75% or more of the activity; assistance is required to complete the activity  Moderate assistance= pt performs 50% of the activity; assistance is required to complete the activity  Maximal assistance = pt performs 25% of the activity; assistance is required to complete the activity  Dependent = pt requires total physical assistance to accomplish the task

## 2021-02-18 NOTE — PROGRESS NOTES
Subjective: The patient complains of severe acute on chronic progressive fatigue and pain on exertion shortness of breath partially relieved by rest, PT, OT and meds and exacerbated by exertion and recent illness. I am concerned about patients medical complexities. She is now on a medical floor but still has cardiac issues nursing note noted below. Reviewed recent nursing note, \"  Patient for CT of chest via wheelchair \". She may need a VATS procedure. I discussed her medical progress and her rehabilitation potential with her daughter. ROS x10: The patient also complains of severely impaired mobility and activities of daily living. Otherwise no new problems with vision, hearing, nose, mouth, throat, dermal, cardiovascular, GI, , pulmonary, musculoskeletal, psychiatric or neurological. See Rehab consult on Rehab chart . Vital signs:  /70   Pulse 80   Temp 98.9 °F (37.2 °C) (Oral)   Resp 20   Ht 5' 3\" (1.6 m)   Wt 158 lb 15.2 oz (72.1 kg)   SpO2 93%   BMI 28.16 kg/m²   I/O:   PO/Intake:     No problems with PO intake, low-sodium no milk. Bowel/Bladder:   continent,    General:  Patient is well developed, adequately nourished, non-obese and     well kempt. HEENT:    PERRLA, hearing intact to loud voice, external inspection of ear     and nose benign. Inspection of lips, tongue and gums benign  Musculoskeletal: No significant change in strength or tone. All joints stable. Inspection and palpation of digits and nails show no clubbing,       cyanosis or inflammatory conditions. Neuro/Psychiatric: Affect: flat-  Alert and oriented to self and     Situation with  min cues. No significant change in deep tendon reflexes or     sensation  Lungs:  Diminished, CTA-B. Respiration effort is normal at rest.  MOLINA  Heart:   S1 = S2,    irreg . No loud murmurs. Abdomen:  Soft, non-tender, no enlargement of liver or spleen.   Extremities:  No significant lower extremity edema or tenderness. Skin:    BUE bruises dt blood draws, no visualized or palpated problems. Rehabilitation:  Physical therapy: FIMS:  Bed Mobility: Scooting: Supervision    Transfers: Sit to Stand: Stand by assistance  Stand to sit: Stand by assistance  Bed to Chair: Stand by assistance, Ambulation 1  Surface: level tile  Device: Rolling Walker  Other Apparatus: O2  Assistance: Stand by assistance  Quality of Gait: FF posture, reciprocal pattern with shortened step length; vc's for posture and step length  Gait Deviations: Slow Lea, Increased JAMES, Decreased step length, Decreased step height  Distance: 20'x3  Comments: steady, with turning with 2ww,      FIMS:  ,        Occupational therapy: FIMS:   ,  , Assessment: Pt is an 79 y/o female from home alone who presents to [facility] with the above deficits which impact her independence and safety during ADLs. Pt limited d/t fatigue, weakness and decreased endurance. Pt would benefit from OT  to maximize independence and safety during ADLs.     Speech therapy: FIMS:        Lab/X-ray studies reviewed, analyzed and discussed with patient and staff:   Recent Results (from the past 24 hour(s))   Brain Natriuretic Peptide    Collection Time: 02/18/21  6:02 AM   Result Value Ref Range    Pro-BNP 1,665 pg/mL   Basic Metabolic Panel    Collection Time: 02/18/21  6:02 AM   Result Value Ref Range    Sodium 135 135 - 144 mEq/L    Potassium 4.2 3.4 - 4.9 mEq/L    Chloride 100 95 - 107 mEq/L    CO2 27 20 - 31 mEq/L    Anion Gap 8 (L) 9 - 15 mEq/L    Glucose 131 (H) 70 - 99 mg/dL    BUN 21 8 - 23 mg/dL    CREATININE 0.76 0.50 - 0.90 mg/dL    GFR Non-African American >60.0 >60    GFR  >60.0 >60    Calcium 8.7 8.5 - 9.9 mg/dL   CBC    Collection Time: 02/18/21  6:02 AM   Result Value Ref Range    WBC 11.5 (H) 4.8 - 10.8 K/uL    RBC 3.41 (L) 4.20 - 5.40 M/uL    Hemoglobin 9.7 (L) 12.0 - 16.0 g/dL    Hematocrit 29.8 (L) 37.0 - 47.0 %    MCV 87.3 82.0 - 100.0 fL    MCH 28.5 27.0 - 31.3 pg    MCHC 32.7 (L) 33.0 - 37.0 %    RDW 13.9 11.5 - 14.5 %    Platelets 584 386 - 087 K/uL       Previous extensive, complex labs, notes and diagnostics reviewed and analyzed. ALLERGIES:    Allergies as of 02/09/2021 - Review Complete 02/09/2021   Allergen Reaction Noted    Latex  07/19/2017    Tape [adhesive tape]  06/28/2016      (please also verify by checking [facility])     Complex Physical Medicine & Rehab Issues Assess & Plan:   1. Severe abnormality of gait and mobility and impaired self-care and ADL's secondary to progressive toxic encephalopathy due to recent CHF and COPD exacerbation. Functional and medical status reassessed regarding patients ability to participate in therapies and patient found to be able to participate in  acute intensive comprehensive inpatient rehabilitation program including PT/OT to improve balance, ambulation, ADLs, and to improve the P/AROM. It is my opinion that they will be able to tolerate 3 hours of therapy a day and benefit from it at an acute level. 2. Bowel constipation and Bladder dysfunction overactive bladder:  frequent toileting, ambulate to bathroom with assistance, check post void residuals. Check for C.difficile x1 if >2 loose stools in 24 hours, continue bowel & bladder program.  Monitor for UTI symptoms including lethargy and confusion  3. Severe mid and low back pain and generalized OA pain: reassess pain every shift and prior to and after each therapy session, give prn  Tylenol, modalities prn in therapy, consider Lidoderm, K-pad prn.   4. Skin breakdown risk:  continue pressure relief program.  Daily skin exams and reports from nursing. 5. Severe fatigue due to immobility and nutritional deficits: Add vitamin B12 vitamin D and CoQ10 titrate dosing and add protein supplementation with low carb content.   6. Complex discharge planning:   Await medical stability to consider transfer back to acute rehab versus lower level of care if patient is not able to tolerate rehab. However I think because of her medical problems she will need close monitoring from medical standpoint it would like to try to get her back to rehab if we can. She was scheduled to go to the acute medical floor status post pleurocentesis. May need VATS. Complex Active General Medical Issues that complicate care Assess & Plan:     1. Active Problems:    Impaired mobility and activities of daily living due to CHF [facility], [facility] admit 2021    Paroxysmal atrial fibrillation (HCC)    Lumbar stenosis with neurogenic claudication    Osteoarthritis of lumbar spine with myelopathy    Hypoxia    Acute on chronic combined systolic (congestive) and diastolic (congestive) heart failure ([address]  Resolved Problems:    * No resolved hospital problems.  Darrick Fernandez D.O., PM&R     Attending    [address]

## 2021-02-18 NOTE — PROGRESS NOTES
INPATIENT PROGRESS NOTES    PATIENT NAME: Sanjuana Lockwood  MRN: [id number]  SERVICE DATE:  February 18, 2021   SERVICE TIME:  2:16 PM      PRIMARY SERVICE: Pulmonary Disease    CHIEF COMPLAINTS: Pleural effusion    INTERVAL HPI: Patient seen and examined at bedside, Interval Notes, orders reviewed. Nursing notes noted    Feels good, denies chest pain, she has minimal dry coughing, denies shortness of breath, no pain, no reported nausea no vomiting. Review of system:     GI Abdominal pain No  Skin Rash No    Social History     Tobacco Use    Smoking status: Never Smoker    Smokeless tobacco: Never Used   Substance Use Topics    Alcohol use: No     Alcohol/week: 0.0 standard drinks         Problem Relation Age of Onset    Arthritis Mother     Arthritis Father     High Blood Pressure Father          OBJECTIVE    Body mass index is 28.16 kg/m². PHYSICAL EXAM:  Vitals:  /77   Pulse 71   Temp 98.9 °F (37.2 °C) (Oral)   Resp 18   Ht 5' 3\" (1.6 m)   Wt 158 lb 15.2 oz (72.1 kg)   SpO2 94%   BMI 28.16 kg/m²     General: alert, cooperative, no distress  Head: normocephalic, atraumatic  Eyes:No gross abnormalities. ENT:  MMM no lesions  Neck:  supple and no masses  Chest : Bilateral basal crackles, no wheezing, nontender, tympanic  Heart[de-identified] Heart sounds are normal.  Regular rate and rhythm without murmur, gallop or rub. ABD:  symmetric, soft, non-tender, no guarding or rebound  Musculoskeletal : no cyanosis, no clubbing and no edema  Neuro:  Grossly normal  Skin: No rashes or nodules noted.   Lymph node:  no cervical nodes  Urology: No Zeng   Psychiatric: appropriate    DATA:   Recent Labs     02/17/21  0619 02/18/21  0602   WBC 11.0* 11.5*   HGB 10.6* 9.7*   HCT 32.8* 29.8*   MCV 88.8 87.3    286     Recent Labs     02/16/21  0452 02/16/21  0452 02/17/21  0619 02/18/21  0602   NA  --    < > 137 135   K  --    < > 4.8 4.2   CL  --    < > 101 100   CO2  --    < > 30 27   BUN  --    < > 23 21 CREATININE  --    < > 0.84 0.76   GLUCOSE  --    < > 114* 131*   CALCIUM  --    < > 9.0 8.7   PROT 6.2*  --   --   --    LABALBU 2.6*  --   --   --    BILITOT 0.3  --   --   --    ALKPHOS 48  --   --   --    AST 16  --   --   --    ALT 14  --   --   --    LABGLOM  --    < > >60.0 >60.0   GFRAA  --    < > >60.0 >60.0    < > = values in this interval not displayed. MV Settings:     FiO2 : 100 %    No results for input(s): PHART, DNB0VEB, PO2ART, JZL4DOQ, BEART, L0MHRPDE in the last 72 hours.     O2 Device: Nasal cannula  O2 Flow Rate (L/min): 4 L/min    DIET LOW SODIUM 2 GM;     MEDICATIONS during current hospitalization:    Continuous Infusions:    Scheduled Meds:   digoxin  125 mcg Oral Daily    midodrine  5 mg Oral TID WC    furosemide  20 mg Oral Daily    citalopram  20 mg Oral Daily    levothyroxine  88 mcg Oral Daily    aspirin  81 mg Oral Daily    vitamin B-12  1,000 mcg Oral Daily    [Held by provider] dilTIAZem  120 mg Oral BID    rivaroxaban  15 mg Oral Daily    budesonide-formoterol  2 puff Inhalation BID    carvedilol  3.125 mg Oral BID    pantoprazole  40 mg Oral BID AC    potassium chloride  20 mEq Oral Daily with breakfast    sodium chloride flush  10 mL Intravenous 2 times per day       PRN Meds:LORazepam, nitroGLYCERIN, simethicone, sodium chloride flush, promethazine **OR** ondansetron, polyethylene glycol, acetaminophen **OR** acetaminophen    Radiology  Echo Complete 2d W Doppler W Color    Result Date: 1/27/2021  Transthoracic Echocardiography Report (TTE)  Demographics  Patient Name   Jass Cuevas        Gender              Female                 Adams Scale  Patient Number [id number]          Race                                                   Ethnicity  Visit Number   [id number]         Room Number         Z473  Corporate ID                     Date of Study       01/27/2021  Accession      [id number]        Referring Physician  Number  Date of Birth  07/12/1932 LVPWs: 1.48 cm  Rt. Vent.  Dimension: 3.74 cm           AO Root Dimension: 3.62 cm                                         ACS: 1.25 cm                                         LA: 4.54 cm                                         LVOT: 2.04 cm Doppler Measurements:  AV Velocity:0.02 m/s                    MV Peak E-Wave: 0.85 m/s  AV Peak Gradient: 10.44 mmHg            MV Peak A-Wave: 0.87 m/s  AV Mean Gradient: 6.01 mmHg  AV Area (Continuity):1.83 cm^2  TR Velocity:2.62 m/s                    Estimated RAP:10 mmHg  TR Gradient:27.54 mmHg                  RVSP:37.54 mmHg Valves  Mitral Valve  Peak E-Wave: 0.85 m/s                 Peak A-Wave: 0.87 m/s                                        E/A Ratio: 0.97                                        Peak Gradient: 2.88 mmHg                                        Deceleration Time: 232.8 msec  Tissue Doppler  E' Septal Velocity: 0.07 m/s  E' Lateral Velocity: 0.08 m/s  Aortic Valve  Peak Velocity: 1.62 m/s                Mean Velocity: 1.15 m/s  Peak Gradient: 10.44 mmHg              Mean Gradient: 6.01 mmHg  Area (continuity): 1.83 cm^2           Area (2D): 1.8 cm^2  AV VTI: 28.8 cm  Cusp Separation: 1.25 cm  Tricuspid Valve  Estimated RVSP: 37.54 mmHg              Estimated RAP: 10 mmHg  TR Velocity: 2.62 m/s                   TR Gradient: 27.54 mmHg  Pulmonic Valve  Peak Velocity: 0.96 m/s           Peak Gradient: 3.66 mmHg                                    Estimated PASP: 37.54 mmHg  LVOT  Peak Velocity: 0.89 m/s              Mean Velocity: 0.57 m/s  Peak Gradient: 3.16 mmHg             Mean Gradient: 1.58 mmHg  LVOT Diameter: 2.04 cm               LVOT VTI: 16.15 cm Structures  Left Atrium  LA Dimension: 4.54 cm                        LA Area: 11.81 cm^2  LA/Aorta: 1.25  LA Volume/Index: 44.64 ml /25 m^2  Left Ventricle  Diastolic Dimension: 9.48 cm         Systolic Dimension: 4.73 cm  Septum Diastolic: 6.52 cm            Septum Systolic: 9.85 cm  PW Diastolic: 1.5 cm                 PW Systolic: 0.43 cm                                       FS: 19.1 %  LV EDV/LV EDV Index: 87.32 ml/49 m^2 LV ESV/LV ESV Index: 52.68 ml/29 m^2  EF Calculated: 39.7 %                LV Length: 6.12 cm  LVOT Diameter: 2.04 cm  Right Atrium  RA Systolic Pressure: 10 mmHg  Right Ventricle  Diastolic Dimension: 6.64 cm                                    RV Systolic Pressure: 15.43 mmHg Aorta/ Miscellaneous Aorta  Aortic Root: 3.62 cm  LVOT Diameter: 2.04 cm    Xr Chest (2 Vw)    1. No evidence of pneumothorax. Resolution of left pleural effusion. Cardiac silhouette remains enlarged. Left lower lobe hazy opacity may represent atelectasis versus pneumonia or infiltrate. COMPARISON: No prior studies available for comparison. DIAGNOSIS:  Post left thoracentesis. Dr. Annalise Carney to read. COMMENTS: I50.43 Acute on chronic combined systolic and diastolic CHF (congestive heart failure) ([facility]) ICD10 TECHNIQUE: XR CHEST (2 VW) FINDINGS: Left lower lobe opacity may represent atelectasis versus pneumonia or infiltrate. Interval resolution of left pleural effusion. No evidence of pneumothorax. Cardiac silhouette is enlarged. Visualized soft tissues, and osseous structures are unremarkable. Cta Chest W Wo Contrast    Result Date: 1/26/2021  EXAMINATION:  CHEST CTA WITH CONTRAST (PULMONARY EMBOLISM PROTOCOL) CLINICAL HISTORY:   PE   I50.43 Acute on chronic combined systolic and diastolic CHF (congestive heart failure) ([facility] [address] ICD10. Technique:  Spiral axial CT acquisition of the chest from the thoracic inlet to the upper abdomen following IV contrast.  2-D images were reconstructed in the sagittal and coronal planes. 3-D MIPS images were generated in the coronal and axial planes. Images were reviewed on the PACS workstation. All images including the 3-D MIPS were personally archived.  Contrast:  IV administration of 100 ml Isovue 300 All CT scans at this facility use dose modulation, iterative reconstruction, and/or weight based dosing when appropriate to reduce radiation dose to as low as reasonably achievable. Comparison:  CTA chest 3/15/2019  RESULT: Limitations: Motion artifact. Evaluation for thromboembolic disease:      - Right heart chambers:  No thromboembolic disease.      - Main pulmonary arteries:  No thromboembolic disease.      - Lobar pulmonary arteries:  No thromboembolic disease.        - Segmental pulmonary arteries:  No thromboembolic disease.        - Subsegmental pulmonary arteries:  Not well visualized due to motion. Lines, tubes, and devices:  None. Lung parenchyma and pleura: Tortuous course of the trachea. Central airways appear grossly patent. Moderate left and small right pleural effusions with associated atelectasis. Limitations in evaluation of the lung parenchyma secondary to motion. Other areas of probable scarring/atelectasis. No pneumothorax. Thoracic inlet, heart, and mediastinum: Visualized thyroid unremarkable. No axillary, mediastinal, or hilar lymphadenopathy. Ascending thoracic aorta aneurysmal, measuring around 5.0 cm, similar to prior. Descending thoracic aorta aneurysmal and measures  up to 4.8 cm, also similar to prior. Normal pulmonary artery size. Cardiomegaly, similar to prior Scattered coronary artery calcifications. Small pericardial effusion. Small hiatal hernia. Bones and soft tissues:  No destructive bone lesion or acute osseous findings. Osteopenia. Scoliosis and kyphosis and multilevel degenerative changes, grossly similar to prior. Chest wall unremarkable. Upper abdomen:  No acute abnormality in the imaged upper abdomen. Reflux of contrast into the hepatic veins, associated with right heart failure. Lower neck: Unremarkable. No CT evidence of pulmonary embolism. Moderate left and small right pleural effusions. Cardiomegaly and small pericardial effusion. Reflux of contrast into the hepatic veins, associated with right heart failure.  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 No lung consolidative opacity. Areas of probable atelectasis/scarring. Ct Abdomen Pelvis W Iv Contrast    Result Date: 2/8/2021  EXAMINATION: CT ABDOMEN PELVIS W IV CONTRAST DATE AND TIME:2/8/2021 10:36 AM CLINICAL HISTORY: Acute abdominal pain. Epigastric pain. epigastric pain  COMPARISON: None available. TECHNIQUE: Contiguous axial CT sections of the abdomen and pelvis. 100 cc's of IV contrast given. .  All CT scans at this facility use dose modulation, iterative reconstruction, and/or weight based dosing when appropriate to reduce radiation dose to as low as reasonably achievable. FINDINGS   Liver: Small hypodensities in the right hepatic lobe the largest measuring 8 mm consistent with small cysts. Spleen: Negative    Pancreas: Negative     Kidney: Negative   Adrenal glands are negative. Bowel: Negative    Nodes: No lymphadenopathy. Aorta: Dilated descending thoracic aorta maximum diameter approximately 4 cm adjacent appearance from the CT scans from January 25, 2021. No infrarenal abdominal aortic aneurysm. Peritoneum: No free fluid or free air. The abdominal wall is intact. Pelvis : 9.4 x 6.5 x 6.6 cm cystic mass in the left adnexa. This appears to be a chronic finding this patient was present on an MRI scan that included this area back in April 2016. Bladder catheter in place with decompressed bladder. Bones: No acute osseous abnormalities. Lung bases: Persistent bibasilar effusions not atelectasis left greater than right. PERSISTENT BIBASILAR PLEURAL-PARENCHYMAL CHANGES. NO ACUTE PATHOLOGY IN THE ABDOMEN OR PELVIS. Xr Chest Portable    1. No evidence of pneumothorax. Near complete resolution of the left pleural effusion. Persistent left lower lobe opacification, may represent pneumonia, though underlying nodule or mass cannot be excluded.  There is vascular congestion consistent with mild pulmonary edema. COMPARISON: February 13, 2021 DIAGNOSIS:  post left thoracentesis- show Dr Cece Malcolm: I50.43 Acute on chronic combined systolic (congestive) and diastolic (congestive) heart failure ([facility] [address] ICD10 TECHNIQUE: XR CHEST PORTABLE FINDINGS: Left lower lobe opacity may represent pneumonia though underlying nodule or mass cannot be excluded. No evidence of pneumothorax. Near complete resolution of the left pleural effusion. Increase in vascular congestion, consistent with pulmonary edema. Cardiac silhouette remains enlarged. Visualized soft tissues, and osseous structures are unremarkable. Xr Chest Portable    Result Date: 2/13/2021  EXAMINATION: XR CHEST PORTABLE. DATE AND TIME:2/13/2021 3:16 AM CLINICAL HISTORY: Shortness of breath   hypoxia  COMPARISONS: February 10, 2021  FINDINGS: Persistent extensive opacity throughout the left hemithorax consistent with effusion and consolidation. Minimal pleural parenchymal changes at the right base. Overall findings are unchanged     No significant change     Xr Chest Portable    Result Date: 2/10/2021  EXAMINATION: CHEST PORTABLE VIEW  CLINICAL HISTORY: Hypoxia COMPARISONS: February 8, 2021 0831 hours  FINDINGS: Single  views of the chest is submitted. The cardiac silhouette is enlarged Pulmonary vascular remains mildly congested. Right sided trachea. Patchy to coalescent infiltrate within the right lower lobe as well as an area of infiltrate/consolidation left lower lobe with trace right pleural effusion and left pleural effusion. .  No Pneumothoraces. PULMONARY VASCULAR REMAINS MILDLY CONGESTED. COULD SUGGEST COMPONENT OF UNDERLYING CHF. CORRELATE CLINICALLY PATCHY TO COALESCENT INFILTRATE WITHIN THE RIGHT LOWER LOBE AS WELL AS AN AREA OF INFILTRATE/CONSOLIDATION LEFT LOWER LOBE WITH TRACE RIGHT PLEURAL EFFUSION AND LEFT PLEURAL EFFUSION. Pedro Uribe Xr Chest Portable    Result Date: 2/8/2021  Exam: XR CHEST PORTABLE History: Shortness of breath. Chest pain. Technique: AP portable view of the chest obtained. Comparison: Portable chest radiograph from February 7, 2021 Findings: Heart size is enlarged. Small bilateral pleural effusions. Bibasilar opacities, left greater than right. No pneumothorax. No acute osseous abnormality. Small bilateral pleural effusions, left greater than right. Bibasilar atelectasis and/or infiltrate, left greater than right. Xr Chest Portable    Result Date: 2/8/2021  EXAMINATION: XR CHEST PORTABLE REASON FOR EXAM: chest pain FINDINGS: Frontal view of the chest reveals cardiomegaly and prominent central pulmonary vasculature consistent with chronic congestion. Findings similar and unchanged from 2/3/2021. Opacification at the left base consistent with residual pneumonitis/atelectasis remains unchanged from previous study 4 days earlier. PNEUMONIA/ATELECTASIS LEFT BASE. CARDIOMEGALY. MILD CHRONIC CENTRAL VASCULAR CONGESTION. NO SIGNIFICANT CHANGE FROM 2/3/2020     Xr Chest Portable    Result Date: 2/4/2021  EXAMINATION: XR CHEST PORTABLE CLINICAL HISTORY: SHORTNESS OF BREATH, CHEST PAIN COMPARISONS: CTA CHEST, JANUARY 25, 2021. CHEST RADIOGRAPHS JANUARY 28, JANUARY 29, 2021. FINDINGS: Osseous structures intact. Cardiopericardial silhouette enlarged and unchanged. Cephalization pulmonary vasculature. No focal airspace disease. Increased opacity left lower lung obscures left diaphragm. STABLE MARKED CARDIOMEGALY. PROBABLE INTERSTITIAL EDEMA. LEFT LOWER LUNG ATELECTASIS/PNEUMONIA. Xr Chest Portable    Result Date: 1/28/2021  EXAMINATION: CHEST PORTABLE VIEW  CLINICAL HISTORY: Short of breath COMPARISONS: None  FINDINGS: Single  views of the chest is submitted. The cardiac silhouette is enlarged. Pulmonary vascular unremarkable. Right sided trachea.  Left lower lobe infiltrate/consolidation, collapse with left sided pleural effusion. LEFT LOWER LOBE INFILTRATE/CONSOLIDATION, COLLAPSE WITH LEFT SIDED PLEURAL EFFUSION    Xr Chest Portable    Result Date: 1/26/2021  EXAMINATION: Portable AP ERECT view of the chest. CLINICAL HISTORY:  SOB , Negative Covid-19 test. DATE: 1/25/2021 5:41 PM COMPARISONS: 7/24/2017 FINDINGS: The heart is moderately enlarged, similar to prior exam.  Prominent interstitial markings, consistent with increasing Central vascular congestion. The costophrenic angles are blunted secondary to pleural effusions bilaterally, left greater than  right. No pneumothorax. There are infiltrates/atelectasis within lung bases, left greater than right. There are moderate degenerative changes in spine. CARDIAC ENLARGEMENT WITH CENTRAL VESSEL CONGESTION AND BILATERAL PLEURAL EFFUSIONS, POSSIBLE CONGESTIVE HEART FAILURE. BIBASILAR INFILTRATES/ATELECTASIS, LEFT GREATER THAN RIGHT. Ir Guided Thoracentesis Pleural    Technically successful ultrasound-guided thoracentesis without immediate complications. HISTORY: Angie Andre is a Female of 80 years age. DIAGNOSIS: Left pleural effusion COMMENTS: I50.43 Acute on chronic combined systolic (congestive) and diastolic (congestive) heart failure (HCC) ICD10 PROCEDURE: Following the discussion of procedure, risks versus benefits, possible complications, informed consent was obtained. Following universal protocol, patient and site verification was performed with a \"timeout\" prior to the procedure. Brief survey of the patient's left hemithorax demonstrate moderate amount of pleural effusion. After selection of an appropriate site for thoracentesis, the thoracic skin was prepped and draped in usual sterile fashion. Under ultrasound guidance, using lidocaine for local anesthesia, a 19-gauge Yueh catheter was inserted in the pleural cavity until effusion was aspirated.   Using Vacutainer bottles, 730 mL of effusion was drained. The catheter was removed and the aspiration site dressed. The patient tolerated procedure well. No immediate complications were identified. Subsequent chest radiograph demonstrated no evidence of pneumothorax. The patient left the radiology department in stable condition. Radiology nurse monitored patient's  vital signs throughout the procedure which lasted approximately 30 minutes. Ir Guided Thoracentesis Pleural    Technically successful ultrasound-guided thoracentesis without immediate complications. HISTORY: Angie Andre is a Female of 80 years age. DIAGNOSIS: Left pleural effusion COMMENTS: I50.43 Acute on chronic combined systolic and diastolic CHF (congestive heart failure) (HCC) ICD10 PROCEDURE: Following the discussion of procedure, risks versus benefits, possible complications, informed consent was obtained. Following universal protocol, patient and site verification was performed with a \"timeout\" prior to the procedure. Brief survey of the patient's left hemithorax demonstrate moderate amount of pleural effusion. After selection of an appropriate site for thoracentesis, the thoracic skin was prepped and draped in usual sterile fashion. Under ultrasound guidance, using lidocaine for local anesthesia, a 19-gauge Yueh catheter was inserted in the pleural cavity until effusion was aspirated. Using Vacutainer bottles, 755 mL of clear yellow effusion was drained. The catheter was removed and the aspiration site dressed. The patient tolerated procedure well. No immediate complications were identified. Subsequent chest radiograph demonstrated no evidence of pneumothorax. The patient left the radiology department in stable condition. Radiology nurse monitored patient's  vital signs throughout the procedure which lasted approximately 30 minutes.      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  Fib    Recommendation  · Consult thoracic surgery for consideration of VATS and pleural biopsy along with pleurodesis +/ - Pleurx  · O2 to keep sat 90 to 92%  · Incentive spirometry and flutter device  · Encourage activity      DW Dr Jaspreet Mack and SANTOS RN     Electronically signed by Jos Bosch MD,  [facility]   on 2/18/2021 at 2:16 PM

## 2021-02-18 NOTE — FLOWSHEET NOTE
AM assessment completed. Patient resting in bed at this time. Denies any chest pain at this time. Remains NSR on the monitor. No edema noted. Pedal pulses palpable. Shortness of breath noted with exertion. Lungs are diminished bilaterally. SATS 97% on 4L NC. She is A/Ox3 but is primarily [place] speaking. She can be up in the room with a 1 person assist/walker. Denies any pain with elimination. Zeng catheter to gravity drainage with light suzanna colored, hazy urine noted. Last BM 2/17/21. Skin remains warm, dry and intact. Band-aid to middle, lateral, left side of back post thoracentesis from 2/16/21. Removed SL from left forearm secondary to being outdated. Catheter intact and dressing applied. New #20g SL placed to left mid forearm with 1 attempt. Vital signs stable and AM medications provided. Call light remains in reach.  Electronically signed by Jamee Beckford RN on 2/18/2021 at 11:48 AM

## 2021-02-18 NOTE — PROGRESS NOTES
CC: SOB   Patient is a 80 y.o. female who presents with a chief complaint of SOB. Patient is followed on a regular basis by Dr. Santosh Arreola MD. Patient with past medical history of SVT status post ablation, descending thoracic aorta aneurysm measuring 4.9 cm, ascending aorta aneurysm measuring 4.3 cm, paroxysmal atrial fibrillation,History of provoked DVT, originally presented to [facility] with significant shortness of breath as well as rapid heartbeat and chest pain. Patient was noted to be in acute decompensated combined heart failure. She was started on IV diuretics. She was also developed paroxysmal atrial fibrillation started on oral anticoagulation. She underwent cardiac catheterization for elevated troponins as well as chest pain and a presentation of acute decompensated heart failure and ejection fraction of 40% was noted to have moderate mid LAD stenosis with negative IFR. Patient underwent left pleural effusion thoracentesis by interventional radiology. She was seen by pulmonary as well. She was transferred to rehab and yesterday developed worsening shortness of breath as well as O2 desaturation and transferred to [facility] for further evaluation. Repeat chest x-ray showed CHF findings and given IV Lasix. Normal sinus rhythm at 60 bpm on telemetry. 2/11/2021: Patient complains of dizziness. She is up at the side of the bed eating breakfast.  Denies any chest pain. Zeng remains in place. Renal function electrolytes are within normal limits. Blood pressure is in the 33M systolic. A. fib on telemetry heart rate in the 90's  -2.2 L total net fluid balance    2/16/2021: Patient complains of left rib/below breast area discomfort. Vague in explanation. Daughter present at bedside. Patient up in bed eating dinner. Zeng remains in place. Status post thoracentesis for left pleural effusion.  -3.4 L total net fluid balance.   A. fib on telemetry 115-100 40s, blood pressure is marginal in the 09E to 94Z systolic. 2/17/2021: Patient converted to normal sinus rhythm overnight. She was initiated on IV digoxin as well as p.o. She is resting comfortably. Normal sinus rhythm at heart rate of 72 bpm on telemetry. She denies any chest pain or shortness of breath. She is on full dose anticoagulation. Zeng remains in place. Blood pressure improved. 2/18/2021: Patient at the side of the bed eating breakfast.  Denies any symptoms at this time. Sinus rhythm on telemetry. Blood pressure is controlled.       Past Medical History        Past Medical History:   Diagnosis Date    Ascending aortic aneurysm ([address] 6/23/2017    Charcot Arleen Tooth muscular atrophy     Depression     Descending aortic aneurysm ([address] 6/23/2017    Essential hypertension 2/16/2018    Headache     HTN (hypertension)     Impaired mobility and activities of daily living     Lumbar stenosis with neurogenic claudication     Myelopathy ([address]     Osteoarthritis          Patient Active Problem List   Diagnosis    Lumbar stenosis with neurogenic claudication    Acquired scoliosis    Osteoporosis    Charcot Arleen Tooth muscular atrophy    Excess weight    Osteoarthritis of lumbar spine with myelopathy    Osteoarthritis    Myelopathy ([address]    Impaired mobility and activities of daily living due to CHF exac, [facility] admit 2021    Headache    Depression    Ascending aortic aneurysm (HCC)    Descending aortic aneurysm (HCC)    Dizziness    Shortness of breath    Essential hypertension    Acute on chronic combined systolic and diastolic CHF (congestive heart failure) (HCC)    Gait abnormality    Paroxysmal atrial fibrillation (HCC)    Pleural effusion    Hypoxia    Acute pulmonary edema (HCC)    Acute on chronic combined systolic (congestive) and diastolic (congestive) heart failure (HCC)     Past Surgical History         Past Surgical History:   Procedure Laterality Date    JOINT REPLACEMENT      THORACENTESIS Left 01/29/2021    total of 755 cc removed per Dr Db Cope specimen sent to lab     Social History   Social History         Socioeconomic History    Marital status:      Spouse name: Not on file    Number of children: Not on file    Years of education: Not on file    Highest education level: Not on file   Occupational History    Not on file   Social Needs    Financial resource strain: Not on file    Food insecurity     Worry: Not on file     Inability: Not on file    Transportation needs     Medical: Not on file     Non-medical: Not on file   Tobacco Use    Smoking status: Never Smoker    Smokeless tobacco: Never Used   Substance and Sexual Activity    Alcohol use: No     Alcohol/week: 0.0 standard drinks    Drug use: No    Sexual activity: Not on file   Lifestyle    Physical activity     Days per week: 0 days     Minutes per session: 0 min    Stress: Only a little   Relationships    Social connections     Talks on phone: More than three times a week     Gets together: More than three times a week     Attends [employer] service: 1 to 4 times per year     Active member of club or organization: No     Attends meetings of clubs or organizations: Never     Relationship status:      Intimate partner violence     Fear of current or ex partner: No     Emotionally abused: No     Physically abused: No     Forced sexual activity: No   Other Topics Concern    Not on file   Social History Narrative    Lives With: Alone, son lives down the street, dtr is in the area    Has a son in the area    Type of Home: [address]: One level    Home Access: Stairs to enter with rails- Number of Steps: 2- Rails: Both    Bathroom Shower/Tub: Tub/Shower unit, Bathroom Equipment: Grab bars in shower, Shower chair    Home Equipment: Rolling walker, Cane(Pt infrequemtly uses DME for ambulation and prefers to furniture walk in home)    ADL Assistance: Independent, Homemaking Assistance: Independent    Homemaking Responsibilities: Yes    Ambulation Assistance: Independent, Transfer Assistance: Independent    Additional Comments: Son stops over 2 times daily         Family History         Family History   Problem Relation Age of Onset    Arthritis Mother     Arthritis Father     High Blood Pressure Father      Current Facility-Administered Medications             Current Facility-Administered Medications   Medication Dose Route Frequency Provider Last Rate Last Admin    citalopram (CELEXA) tablet 20 mg 20 mg Oral Daily Calos Burton MD      levothyroxine (SYNTHROID) tablet 88 mcg 88 mcg Oral Daily Calos Burton MD  88 mcg at 02/10/21 4087    aspirin chewable tablet 81 mg 81 mg Oral Daily Calos Burton MD      vitamin B-12 (CYANOCOBALAMIN) tablet 1,000 mcg 1,000 mcg Oral Daily MD Lila Delcid [facility]) extended release capsule 120 mg 120 mg Oral BID Calos Burton MD      sodium chloride flush 0.9 % injection 10 mL 10 mL Intravenous 2 times per day Calos Burton MD  10 mL at 02/09/21 2130    sodium chloride flush 0.9 % injection 10 mL 10 mL Intravenous PRN Calos Burton MD      [facility]) tablet 12.5 mg 12.5 mg Oral Q6H PRN Calos Burton MD      Or    [facility]ansetron [facility]) injection 4 mg 4 mg Intravenous Q6H PRN Calos Burton MD      polyethylene glycol [facility]) packet 17 g 17 g Oral Daily PRN Calos Burton MD      acetaminophen (TYLENOL) tablet 650 mg 650 mg Oral Q6H PRN Calos Burton MD      Or    acetaminophen (TYLENOL) suppository 650 mg 650 mg Rectal Q6H PRN Calos Burton MD      enoxaparin (LOVENOX) injection 40 mg 40 mg Subcutaneous Daily Calos Burton MD      furosemide (LASIX) injection 20 mg 20 mg Intravenous BID Calos Burton MD     ALLERGIES: Latex and Tape Robbi Idania tape]   Review of Systems   Constitutional: Negative. Negative for chills and fever.    HENT: Negative. Eyes: Negative. Respiratory: Positive for shortness of breath. Negative for wheezing. Cardiovascular: Positive for chest pain. Negative for palpitations and leg swelling. Gastrointestinal: Negative. Negative for abdominal pain, nausea and vomiting. Endocrine: Negative. Genitourinary: Negative. Musculoskeletal: Negative. Skin: Negative. Negative for rash. Allergic/Immunologic: Negative. Neurological: Negative for dizziness, weakness and headaches. Hematological: Negative. Psychiatric/Behavioral: Negative. VITALS:   Blood pressure 117/71, pulse 69, temperature 98.2 °F (36.8 °C), weight 163 lb 12.8 oz (74.3 kg), SpO2 96 %. Body mass index is 29.02 kg/m². Physical Exam   Constitutional: She is oriented to person, place, and time. She appears well-developed and well-nourished. HENT:   Head: Normocephalic and atraumatic. Eyes: Pupils are equal, round, and reactive to light. Neck: Normal range of motion. Neck supple. No JVD present. No tracheal deviation present. No thyromegaly present. Cardiovascular: Normal rate, regular rhythm, normal heart sounds and intact distal pulses. PMI is not displaced. Exam reveals no gallop, no S3, no distant heart sounds and no friction rub. No murmur heard. Pulmonary/Chest: No respiratory distress. She has no wheezes. She has no rales. She exhibits no tenderness. Abdominal: Soft. Bowel sounds are normal. She exhibits no distension and no mass. There is no abdominal tenderness. There is no rebound and no guarding. Musculoskeletal:   General: No edema. Neurological: She is alert and oriented to person, place, and time. No cranial nerve deficit. Skin: Skin is warm and dry. No rash noted. She is not diaphoretic. No erythema. No pallor. Psychiatric: She has a normal mood and affect.  Her behavior is normal. Judgment and thought content normal.     LABS:   Recent Results         Recent Results (from the past 24 hour(s))   COVID-19 Collection Time: 02/09/21 3:33 PM   Result Value Ref Range    SARS-CoV-2, NAAT Not Detected Not Detected   EKG 12 Lead    Collection Time: 02/10/21 12:51 AM   Result Value Ref Range    Ventricular Rate 70 BPM    Atrial Rate 70 BPM    P-R Interval 204 ms    QRS Duration 150 ms    Q-T Interval 466 ms    QTc Calculation (Bazett) 503 ms    P Axis 59 degrees    R Axis -61 degrees    T Axis -59 degrees   Basic Metabolic Panel    Collection Time: 02/10/21 5:47 AM   Result Value Ref Range    Sodium 137 135 - 144 mEq/L    Potassium 4.0 3.4 - 4.9 mEq/L    Chloride 99 95 - 107 mEq/L    CO2 27 20 - 31 mEq/L    Anion Gap 11 9 - 15 mEq/L    Glucose 134 (H) 70 - 99 mg/dL    BUN 15 8 - 23 mg/dL    CREATININE 0.81 0.50 - 0.90 mg/dL    GFR Non-African American >60.0 >60    GFR  >60.0 >60    Calcium 8.5 8.5 - 9.9 mg/dL   Magnesium    Collection Time: 02/10/21 5:47 AM   Result Value Ref Range    Magnesium 1.7 1.7 - 2.4 mg/dL   Lipid panel - fasting    Collection Time: 02/10/21 5:47 AM   Result Value Ref Range    Cholesterol, Total 123 0 - 199 mg/dL    Triglycerides 78 0 - 150 mg/dL    HDL 39 (L) 40 - 59 mg/dL    LDL Calculated 68 0 - 129 mg/dL   Troponin:         Lab Results   Component Value Date    TROPONINI <0.010 01/26/2021     EKG: normal sinus rhythm, nonspecific ST and T waves changes       ASSESSMENT:   Acute on chronic decompensated combined congestive heart failure. -Improved  NICMP EF of 40%. Paroxysmal atrial fibrillation-sinus rhythm today. History of coronary disease- Mod LAD disease, negative IFR. History of SVT status post ablation   History of ascending and descending thoracic aortic aneurysm-stable   Essential hypertension   Left pleural effusion status post repeat thoracentesis. PLAN:   1. As always, aggressive risk factor modification is strongly recommended. We should adhere to the JNC VIII guidelines for HTN management and the NCEPATP III guidelines for LDL-C management.   2. Maintain taylor./Urology recommendation  3. PO lasix given low BP/dizziness, monitor I's and O's and renal function. 4. Max cardiac meds-holding ARB, CCB due to hypotension. Continue with Coreg 3.125 mg twice daily. 5. Continue with p.o. digoxin given history of paroxysmal A. fib with RVR and hypotension. 6. Continue with Xarelto  7. Pulmonary recommendations  8. CHF teaching and follow up in CHF clinic post discharge  9. Low sodium diet. 10. Urology evaluation/recs regarding urinary retention. 11. Likely stable to discharge/transfer to rehab.         Electronically signed by Oswald Hall DO on 2/18/2021 at 11:06 AM

## 2021-02-19 ENCOUNTER — HOSPITAL ENCOUNTER (INPATIENT)
Age: 86
LOS: 11 days | Discharge: HOME HEALTH | DRG: 091 | End: 2021-03-02
Attending: PHYSICAL MEDICINE & REHABILITATION | Admitting: PHYSICAL MEDICINE & REHABILITATION
Payer: MEDICARE

## 2021-02-19 VITALS
WEIGHT: 157.3 LBS | SYSTOLIC BLOOD PRESSURE: 126 MMHG | HEIGHT: 63 IN | TEMPERATURE: 98 F | RESPIRATION RATE: 18 BRPM | HEART RATE: 71 BPM | BODY MASS INDEX: 27.87 KG/M2 | DIASTOLIC BLOOD PRESSURE: 81 MMHG | OXYGEN SATURATION: 96 %

## 2021-02-19 PROBLEM — Z74.09 IMPAIRED MOBILITY: Status: ACTIVE | Noted: 2021-02-19

## 2021-02-19 LAB
ANION GAP SERPL CALCULATED.3IONS-SCNC: 9 MEQ/L (ref 9–15)
BODY FLUID CULTURE, STERILE: NORMAL
BUN BLDV-MCNC: 15 MG/DL (ref 8–23)
CALCIUM SERPL-MCNC: 9 MG/DL (ref 8.5–9.9)
CHLORIDE BLD-SCNC: 103 MEQ/L (ref 95–107)
CO2: 28 MEQ/L (ref 20–31)
CREAT SERPL-MCNC: 0.62 MG/DL (ref 0.5–0.9)
EKG ATRIAL RATE: 113 BPM
EKG ATRIAL RATE: 280 BPM
EKG ATRIAL RATE: 284 BPM
EKG ATRIAL RATE: 288 BPM
EKG ATRIAL RATE: 35 BPM
EKG ATRIAL RATE: 68 BPM
EKG ATRIAL RATE: 69 BPM
EKG ATRIAL RATE: 70 BPM
EKG ATRIAL RATE: 71 BPM
EKG ATRIAL RATE: 81 BPM
EKG ATRIAL RATE: 91 BPM
EKG P AXIS: 113 DEGREES
EKG P AXIS: 143 DEGREES
EKG P AXIS: 59 DEGREES
EKG P AXIS: 59 DEGREES
EKG P AXIS: 60 DEGREES
EKG P AXIS: 64 DEGREES
EKG P-R INTERVAL: 204 MS
EKG P-R INTERVAL: 204 MS
EKG P-R INTERVAL: 208 MS
EKG P-R INTERVAL: 218 MS
EKG Q-T INTERVAL: 282 MS
EKG Q-T INTERVAL: 284 MS
EKG Q-T INTERVAL: 390 MS
EKG Q-T INTERVAL: 416 MS
EKG Q-T INTERVAL: 424 MS
EKG Q-T INTERVAL: 438 MS
EKG Q-T INTERVAL: 446 MS
EKG Q-T INTERVAL: 452 MS
EKG Q-T INTERVAL: 452 MS
EKG Q-T INTERVAL: 466 MS
EKG Q-T INTERVAL: 470 MS
EKG QRS DURATION: 130 MS
EKG QRS DURATION: 132 MS
EKG QRS DURATION: 134 MS
EKG QRS DURATION: 134 MS
EKG QRS DURATION: 140 MS
EKG QRS DURATION: 142 MS
EKG QRS DURATION: 144 MS
EKG QRS DURATION: 146 MS
EKG QRS DURATION: 146 MS
EKG QRS DURATION: 150 MS
EKG QRS DURATION: 150 MS
EKG QTC CALCULATION (BAZETT): 367 MS
EKG QTC CALCULATION (BAZETT): 392 MS
EKG QTC CALCULATION (BAZETT): 442 MS
EKG QTC CALCULATION (BAZETT): 454 MS
EKG QTC CALCULATION (BAZETT): 484 MS
EKG QTC CALCULATION (BAZETT): 486 MS
EKG QTC CALCULATION (BAZETT): 488 MS
EKG QTC CALCULATION (BAZETT): 491 MS
EKG QTC CALCULATION (BAZETT): 491 MS
EKG QTC CALCULATION (BAZETT): 503 MS
EKG QTC CALCULATION (BAZETT): 510 MS
EKG R AXIS: -61 DEGREES
EKG R AXIS: -62 DEGREES
EKG R AXIS: -64 DEGREES
EKG R AXIS: -64 DEGREES
EKG R AXIS: -65 DEGREES
EKG R AXIS: -66 DEGREES
EKG R AXIS: -67 DEGREES
EKG R AXIS: -67 DEGREES
EKG R AXIS: -68 DEGREES
EKG R AXIS: -73 DEGREES
EKG R AXIS: -75 DEGREES
EKG T AXIS: -57 DEGREES
EKG T AXIS: -59 DEGREES
EKG T AXIS: -60 DEGREES
EKG T AXIS: -61 DEGREES
EKG T AXIS: -7 DEGREES
EKG T AXIS: -7 DEGREES
EKG T AXIS: -79 DEGREES
EKG T AXIS: -86 DEGREES
EKG T AXIS: 187 DEGREES
EKG T AXIS: 201 DEGREES
EKG T AXIS: 243 DEGREES
EKG VENTRICULAR RATE: 102 BPM
EKG VENTRICULAR RATE: 115 BPM
EKG VENTRICULAR RATE: 68 BPM
EKG VENTRICULAR RATE: 69 BPM
EKG VENTRICULAR RATE: 70 BPM
EKG VENTRICULAR RATE: 70 BPM
EKG VENTRICULAR RATE: 71 BPM
EKG VENTRICULAR RATE: 71 BPM
EKG VENTRICULAR RATE: 73 BPM
EKG VENTRICULAR RATE: 74 BPM
EKG VENTRICULAR RATE: 93 BPM
GFR AFRICAN AMERICAN: >60
GFR NON-AFRICAN AMERICAN: >60
GLUCOSE BLD-MCNC: 98 MG/DL (ref 70–99)
GRAM STAIN RESULT: NORMAL
HCT VFR BLD CALC: 31.3 % (ref 37–47)
HEMOGLOBIN: 10.1 G/DL (ref 12–16)
MCH RBC QN AUTO: 28.4 PG (ref 27–31.3)
MCHC RBC AUTO-ENTMCNC: 32.3 % (ref 33–37)
MCV RBC AUTO: 88 FL (ref 82–100)
PDW BLD-RTO: 14.4 % (ref 11.5–14.5)
PLATELET # BLD: 278 K/UL (ref 130–400)
POTASSIUM SERPL-SCNC: 4.2 MEQ/L (ref 3.4–4.9)
RBC # BLD: 3.56 M/UL (ref 4.2–5.4)
REASON FOR REJECTION: NORMAL
REJECTED TEST: NORMAL
SARS-COV-2, NAAT: NOT DETECTED
SODIUM BLD-SCNC: 140 MEQ/L (ref 135–144)
WBC # BLD: 9.1 K/UL (ref 4.8–10.8)

## 2021-02-19 PROCEDURE — 80048 BASIC METABOLIC PNL TOTAL CA: CPT

## 2021-02-19 PROCEDURE — 85027 COMPLETE CBC AUTOMATED: CPT

## 2021-02-19 PROCEDURE — 6370000000 HC RX 637 (ALT 250 FOR IP): Performed by: FAMILY MEDICINE

## 2021-02-19 PROCEDURE — 94640 AIRWAY INHALATION TREATMENT: CPT

## 2021-02-19 PROCEDURE — 6370000000 HC RX 637 (ALT 250 FOR IP): Performed by: INTERNAL MEDICINE

## 2021-02-19 PROCEDURE — 99232 SBSQ HOSP IP/OBS MODERATE 35: CPT | Performed by: INTERNAL MEDICINE

## 2021-02-19 PROCEDURE — 87635 SARS-COV-2 COVID-19 AMP PRB: CPT

## 2021-02-19 PROCEDURE — 99222 1ST HOSP IP/OBS MODERATE 55: CPT | Performed by: THORACIC SURGERY (CARDIOTHORACIC VASCULAR SURGERY)

## 2021-02-19 PROCEDURE — 93010 ELECTROCARDIOGRAM REPORT: CPT | Performed by: INTERNAL MEDICINE

## 2021-02-19 PROCEDURE — 93005 ELECTROCARDIOGRAM TRACING: CPT | Performed by: INTERNAL MEDICINE

## 2021-02-19 PROCEDURE — 36415 COLL VENOUS BLD VENIPUNCTURE: CPT

## 2021-02-19 PROCEDURE — APPNB30 APP NON BILLABLE TIME 0-30 MINS: Performed by: PHYSICIAN ASSISTANT

## 2021-02-19 PROCEDURE — 2580000003 HC RX 258: Performed by: FAMILY MEDICINE

## 2021-02-19 PROCEDURE — 99231 SBSQ HOSP IP/OBS SF/LOW 25: CPT | Performed by: PHYSICAL MEDICINE & REHABILITATION

## 2021-02-19 PROCEDURE — 2700000000 HC OXYGEN THERAPY PER DAY

## 2021-02-19 PROCEDURE — 97116 GAIT TRAINING THERAPY: CPT

## 2021-02-19 PROCEDURE — 1180000000 HC REHAB R&B

## 2021-02-19 PROCEDURE — 94761 N-INVAS EAR/PLS OXIMETRY MLT: CPT

## 2021-02-19 RX ORDER — SIMETHICONE 80 MG
80 TABLET,CHEWABLE ORAL EVERY 6 HOURS PRN
Status: DISCONTINUED | OUTPATIENT
Start: 2021-02-19 | End: 2021-03-02 | Stop reason: HOSPADM

## 2021-02-19 RX ORDER — BUDESONIDE AND FORMOTEROL FUMARATE DIHYDRATE 160; 4.5 UG/1; UG/1
2 AEROSOL RESPIRATORY (INHALATION) 2 TIMES DAILY
Status: CANCELLED | OUTPATIENT
Start: 2021-02-19

## 2021-02-19 RX ORDER — SODIUM CHLORIDE 0.9 % (FLUSH) 0.9 %
10 SYRINGE (ML) INJECTION PRN
Status: DISCONTINUED | OUTPATIENT
Start: 2021-02-19 | End: 2021-03-02 | Stop reason: HOSPADM

## 2021-02-19 RX ORDER — ASPIRIN 81 MG/1
81 TABLET, CHEWABLE ORAL DAILY
Status: DISCONTINUED | OUTPATIENT
Start: 2021-02-20 | End: 2021-03-02 | Stop reason: HOSPADM

## 2021-02-19 RX ORDER — LORAZEPAM 0.5 MG/1
0.5 TABLET ORAL EVERY 6 HOURS PRN
Status: CANCELLED | OUTPATIENT
Start: 2021-02-19

## 2021-02-19 RX ORDER — POLYETHYLENE GLYCOL 3350 17 G/17G
17 POWDER, FOR SOLUTION ORAL DAILY PRN
Status: DISCONTINUED | OUTPATIENT
Start: 2021-02-19 | End: 2021-02-24

## 2021-02-19 RX ORDER — BUDESONIDE AND FORMOTEROL FUMARATE DIHYDRATE 160; 4.5 UG/1; UG/1
2 AEROSOL RESPIRATORY (INHALATION) 2 TIMES DAILY
Status: DISCONTINUED | OUTPATIENT
Start: 2021-02-19 | End: 2021-03-02 | Stop reason: HOSPADM

## 2021-02-19 RX ORDER — CITALOPRAM 20 MG/1
20 TABLET ORAL DAILY
Status: CANCELLED | OUTPATIENT
Start: 2021-02-20

## 2021-02-19 RX ORDER — FUROSEMIDE 20 MG/1
20 TABLET ORAL DAILY
Status: DISCONTINUED | OUTPATIENT
Start: 2021-02-20 | End: 2021-03-02 | Stop reason: HOSPADM

## 2021-02-19 RX ORDER — FUROSEMIDE 20 MG/1
20 TABLET ORAL DAILY
Status: CANCELLED | OUTPATIENT
Start: 2021-02-20

## 2021-02-19 RX ORDER — ACETAMINOPHEN 650 MG/1
650 SUPPOSITORY RECTAL EVERY 6 HOURS PRN
Status: CANCELLED | OUTPATIENT
Start: 2021-02-19

## 2021-02-19 RX ORDER — SODIUM CHLORIDE 0.9 % (FLUSH) 0.9 %
10 SYRINGE (ML) INJECTION EVERY 12 HOURS SCHEDULED
Status: DISCONTINUED | OUTPATIENT
Start: 2021-02-19 | End: 2021-02-23 | Stop reason: ALTCHOICE

## 2021-02-19 RX ORDER — CHOLECALCIFEROL (VITAMIN D3) 125 MCG
1000 CAPSULE ORAL DAILY
Status: CANCELLED | OUTPATIENT
Start: 2021-02-20

## 2021-02-19 RX ORDER — CARVEDILOL 3.12 MG/1
3.12 TABLET ORAL 2 TIMES DAILY
Status: DISCONTINUED | OUTPATIENT
Start: 2021-02-19 | End: 2021-03-02 | Stop reason: HOSPADM

## 2021-02-19 RX ORDER — LEVOTHYROXINE SODIUM 88 UG/1
88 TABLET ORAL DAILY
Status: DISCONTINUED | OUTPATIENT
Start: 2021-02-20 | End: 2021-03-02 | Stop reason: HOSPADM

## 2021-02-19 RX ORDER — PANTOPRAZOLE SODIUM 40 MG/1
40 TABLET, DELAYED RELEASE ORAL
Status: DISCONTINUED | OUTPATIENT
Start: 2021-02-20 | End: 2021-03-02 | Stop reason: HOSPADM

## 2021-02-19 RX ORDER — MIDODRINE HYDROCHLORIDE 5 MG/1
5 TABLET ORAL
Status: DISCONTINUED | OUTPATIENT
Start: 2021-02-20 | End: 2021-02-27

## 2021-02-19 RX ORDER — CITALOPRAM 20 MG/1
20 TABLET ORAL DAILY
Status: DISCONTINUED | OUTPATIENT
Start: 2021-02-20 | End: 2021-03-02 | Stop reason: HOSPADM

## 2021-02-19 RX ORDER — SODIUM CHLORIDE 0.9 % (FLUSH) 0.9 %
10 SYRINGE (ML) INJECTION EVERY 12 HOURS SCHEDULED
Status: CANCELLED | OUTPATIENT
Start: 2021-02-19

## 2021-02-19 RX ORDER — ACETAMINOPHEN 325 MG/1
650 TABLET ORAL EVERY 6 HOURS PRN
Status: DISCONTINUED | OUTPATIENT
Start: 2021-02-19 | End: 2021-03-02 | Stop reason: HOSPADM

## 2021-02-19 RX ORDER — CARVEDILOL 3.12 MG/1
3.12 TABLET ORAL 2 TIMES DAILY
Status: CANCELLED | OUTPATIENT
Start: 2021-02-19

## 2021-02-19 RX ORDER — PROMETHAZINE HYDROCHLORIDE 12.5 MG/1
12.5 TABLET ORAL EVERY 6 HOURS PRN
Status: DISCONTINUED | OUTPATIENT
Start: 2021-02-19 | End: 2021-03-02 | Stop reason: HOSPADM

## 2021-02-19 RX ORDER — ONDANSETRON 2 MG/ML
4 INJECTION INTRAMUSCULAR; INTRAVENOUS EVERY 6 HOURS PRN
Status: DISCONTINUED | OUTPATIENT
Start: 2021-02-19 | End: 2021-03-02 | Stop reason: HOSPADM

## 2021-02-19 RX ORDER — PANTOPRAZOLE SODIUM 40 MG/1
40 TABLET, DELAYED RELEASE ORAL
Status: CANCELLED | OUTPATIENT
Start: 2021-02-20

## 2021-02-19 RX ORDER — ONDANSETRON 2 MG/ML
4 INJECTION INTRAMUSCULAR; INTRAVENOUS EVERY 6 HOURS PRN
Status: CANCELLED | OUTPATIENT
Start: 2021-02-19

## 2021-02-19 RX ORDER — SIMETHICONE 80 MG
80 TABLET,CHEWABLE ORAL EVERY 6 HOURS PRN
Status: CANCELLED | OUTPATIENT
Start: 2021-02-19

## 2021-02-19 RX ORDER — POLYETHYLENE GLYCOL 3350 17 G/17G
17 POWDER, FOR SOLUTION ORAL DAILY PRN
Status: CANCELLED | OUTPATIENT
Start: 2021-02-19

## 2021-02-19 RX ORDER — DIGOXIN 125 MCG
125 TABLET ORAL DAILY
Status: CANCELLED | OUTPATIENT
Start: 2021-02-20

## 2021-02-19 RX ORDER — CHOLECALCIFEROL (VITAMIN D3) 125 MCG
1000 CAPSULE ORAL DAILY
Status: DISCONTINUED | OUTPATIENT
Start: 2021-02-20 | End: 2021-03-02 | Stop reason: HOSPADM

## 2021-02-19 RX ORDER — MIDODRINE HYDROCHLORIDE 2.5 MG/1
5 TABLET ORAL
Status: CANCELLED | OUTPATIENT
Start: 2021-02-19

## 2021-02-19 RX ORDER — NITROGLYCERIN 0.4 MG/1
0.4 TABLET SUBLINGUAL EVERY 5 MIN PRN
Status: DISCONTINUED | OUTPATIENT
Start: 2021-02-19 | End: 2021-03-02 | Stop reason: HOSPADM

## 2021-02-19 RX ORDER — SODIUM CHLORIDE 0.9 % (FLUSH) 0.9 %
10 SYRINGE (ML) INJECTION PRN
Status: CANCELLED | OUTPATIENT
Start: 2021-02-19

## 2021-02-19 RX ORDER — LEVOTHYROXINE SODIUM 88 UG/1
88 TABLET ORAL DAILY
Status: CANCELLED | OUTPATIENT
Start: 2021-02-20

## 2021-02-19 RX ORDER — POTASSIUM CHLORIDE 20 MEQ/1
20 TABLET, EXTENDED RELEASE ORAL
Status: CANCELLED | OUTPATIENT
Start: 2021-02-20

## 2021-02-19 RX ORDER — NITROGLYCERIN 0.4 MG/1
0.4 TABLET SUBLINGUAL EVERY 5 MIN PRN
Status: CANCELLED | OUTPATIENT
Start: 2021-02-19

## 2021-02-19 RX ORDER — ASPIRIN 81 MG/1
81 TABLET, CHEWABLE ORAL DAILY
Status: CANCELLED | OUTPATIENT
Start: 2021-02-20

## 2021-02-19 RX ORDER — LORAZEPAM 0.5 MG/1
0.5 TABLET ORAL EVERY 6 HOURS PRN
Status: DISCONTINUED | OUTPATIENT
Start: 2021-02-19 | End: 2021-03-02 | Stop reason: HOSPADM

## 2021-02-19 RX ORDER — ACETAMINOPHEN 650 MG/1
650 SUPPOSITORY RECTAL EVERY 6 HOURS PRN
Status: DISCONTINUED | OUTPATIENT
Start: 2021-02-19 | End: 2021-03-02 | Stop reason: HOSPADM

## 2021-02-19 RX ORDER — POTASSIUM CHLORIDE 20 MEQ/1
20 TABLET, EXTENDED RELEASE ORAL
Status: DISCONTINUED | OUTPATIENT
Start: 2021-02-20 | End: 2021-03-02 | Stop reason: HOSPADM

## 2021-02-19 RX ORDER — DIGOXIN 125 MCG
125 TABLET ORAL DAILY
Status: DISCONTINUED | OUTPATIENT
Start: 2021-02-20 | End: 2021-03-02 | Stop reason: HOSPADM

## 2021-02-19 RX ORDER — ACETAMINOPHEN 325 MG/1
650 TABLET ORAL EVERY 6 HOURS PRN
Status: CANCELLED | OUTPATIENT
Start: 2021-02-19

## 2021-02-19 RX ORDER — PROMETHAZINE HYDROCHLORIDE 12.5 MG/1
12.5 TABLET ORAL EVERY 6 HOURS PRN
Status: CANCELLED | OUTPATIENT
Start: 2021-02-19

## 2021-02-19 RX ADMIN — CYANOCOBALAMIN TAB 500 MCG 1000 MCG: 500 TAB at 10:13

## 2021-02-19 RX ADMIN — RIVAROXABAN 15 MG: 15 TABLET, FILM COATED ORAL at 20:21

## 2021-02-19 RX ADMIN — MIDODRINE HYDROCHLORIDE 5 MG: 5 TABLET ORAL at 15:08

## 2021-02-19 RX ADMIN — ASPIRIN 81 MG: 81 TABLET, CHEWABLE ORAL at 10:12

## 2021-02-19 RX ADMIN — CARVEDILOL 3.12 MG: 3.12 TABLET, FILM COATED ORAL at 10:13

## 2021-02-19 RX ADMIN — BUDESONIDE AND FORMOTEROL FUMARATE DIHYDRATE 2 PUFF: 160; 4.5 AEROSOL RESPIRATORY (INHALATION) at 08:05

## 2021-02-19 RX ADMIN — POTASSIUM CHLORIDE 20 MEQ: 20 TABLET, EXTENDED RELEASE ORAL at 10:13

## 2021-02-19 RX ADMIN — MIDODRINE HYDROCHLORIDE 5 MG: 5 TABLET ORAL at 10:12

## 2021-02-19 RX ADMIN — CARVEDILOL 3.12 MG: 3.12 TABLET, FILM COATED ORAL at 20:18

## 2021-02-19 RX ADMIN — LORAZEPAM 0.5 MG: 0.5 TABLET ORAL at 20:18

## 2021-02-19 RX ADMIN — PANTOPRAZOLE SODIUM 40 MG: 40 TABLET, DELAYED RELEASE ORAL at 15:09

## 2021-02-19 RX ADMIN — FUROSEMIDE 20 MG: 20 TABLET ORAL at 10:13

## 2021-02-19 RX ADMIN — PANTOPRAZOLE SODIUM 40 MG: 40 TABLET, DELAYED RELEASE ORAL at 06:23

## 2021-02-19 RX ADMIN — CITALOPRAM HYDROBROMIDE 20 MG: 20 TABLET ORAL at 10:12

## 2021-02-19 RX ADMIN — ACETAMINOPHEN 650 MG: 325 TABLET ORAL at 20:17

## 2021-02-19 RX ADMIN — Medication 10 ML: at 10:14

## 2021-02-19 RX ADMIN — LEVOTHYROXINE SODIUM 88 MCG: 0.09 TABLET ORAL at 06:22

## 2021-02-19 RX ADMIN — DIGOXIN 125 MCG: 125 TABLET ORAL at 10:13

## 2021-02-19 ASSESSMENT — ENCOUNTER SYMPTOMS
SHORTNESS OF BREATH: 1
TROUBLE SWALLOWING: 0
CHOKING: 0
DIARRHEA: 0
STRIDOR: 0
VOICE CHANGE: 0
CHEST TIGHTNESS: 0
NAUSEA: 0
SORE THROAT: 0
WHEEZING: 0
ABDOMINAL PAIN: 0
VOMITING: 0
COUGH: 0
ABDOMINAL DISTENTION: 0

## 2021-02-19 ASSESSMENT — PAIN SCALES - GENERAL
PAINLEVEL_OUTOF10: 8
PAINLEVEL_OUTOF10: 0

## 2021-02-19 NOTE — PROGRESS NOTES
Physical Therapy  Physical Therapy Med Surg Daily Treatment Note  Facility/Department: [facility]  Room: K378/O802-56       NAME: Hank Phan  : 1932 (80 y.o.)  MRN: [id number]  CODE STATUS: Full Code    Date of Service: 2021    Patient Diagnosis(es): Acute on chronic combined systolic (congestive) and diastolic (congestive) heart failure ([address] [I50.43]   No chief complaint on file.     Patient Active Problem List    Diagnosis Date Noted    Paroxysmal atrial fibrillation [facility])      Priority: High    Impaired mobility and activities of daily living due to CHF [facility] admit       Priority: High    Lumbar stenosis with neurogenic claudication 2016     Priority: Medium    Acute on chronic combined systolic (congestive) and diastolic (congestive) heart failure (HCC) 2021    Pleural effusion 2021    Hypoxia     Acute pulmonary edema (HCC)     Gait abnormality 2021    Acute on chronic combined systolic and diastolic CHF (congestive heart failure) ([address] 2021    Essential hypertension 2018    Shortness of breath     Dizziness     Ascending aortic aneurysm (HCC) 2017    Descending aortic aneurysm ([address] 2017    Osteoarthritis     Myelopathy ([address]     Headache     Depression     Acquired scoliosis 2016    Osteoporosis 2016    Charcot Arleen Tooth muscular atrophy 2016    Excess weight 2016    Osteoarthritis of lumbar spine with myelopathy 2016        Past Medical History:   Diagnosis Date    Ascending aortic aneurysm ([address] 2017    Charcot Arleen Tooth muscular atrophy     Depression     Descending aortic aneurysm ([address] 2017    Essential hypertension 2018    Headache     HTN (hypertension)     Impaired mobility and activities of daily living     Lumbar stenosis with neurogenic claudication     Myelopathy ([address]     Osteoarthritis      Past Surgical History:   Procedure Laterality Date    JOINT REPLACEMENT      THORACENTESIS Left 01/29/2021    total of 755 cc removed per Dr Db Cope specimen sent to lab       Restrictions       SUBJECTIVE        Pre-Session Pain Report  Pre Treatment Pain Screening  Pain at present: 0  Scale Used: Numeric Score  Intervention List: Patient able to continue with treatment          Post-Session Pain Report            OBJECTIVE        Bed mobility  Rolling to Left: Unable to assess  Rolling to Right: Supervision  Supine to Sit: Supervision  Sit to Supine: Stand by assistance  Scooting: Stand by assistance  Comment: Pt with HOB slightly raised and use of bed rails. Transfers  Sit to Stand: Stand by assistance;Supervision  Stand to sit: Stand by assistance;Supervision  Bed to Chair: Stand by assistance;Supervision    Ambulation  Ambulation?: Yes  Ambulation 1  Surface: level tile  Device: Rolling Walker  Other Apparatus: O2  Assistance: Stand by assistance  Quality of Gait: Steady gait with mild trunk flexion. Flat footed gait with mild foot drop. No LOB. Distance: 100'  Comments: Good ability to control walker and change direction    Stairs/Curb  Stairs?: No                                               ASSESSMENT   Assessment: Pt demonstrated improved functional mobility with bed and transfers with good carry over with safety and hand placement. Pt with improved gait tolerance from 30' to 100'. SpO2 not tested during treatment. Pt wanting to return to bed at end of session, as nursing had just gotten pt BTB prior to treatment.      Discharge Recommendations:  Continue to assess pending progress    Goals  Long term goals  Long term goal 1: Pt will perform bed mobility with indep  Long term goal 2: Pt will perform functional transfers with indep  Long term goal 3: Pt will ambulate >/=50ft with 2ww and mod I  Long term goal 4: Pt will negotiate 4 steps with handrail and SBA    PLAN    Times per week: 3-6        [facility] (6 CLICK) BASIC MOBILITY  AM-PAC Inpatient Mobility Raw Score : 23     Therapy Time   Individual   Time In 1047   Time Out 1057   Minutes 10      Minutes: 10  Bed Mobility/ADL; 2  Gait: [address], PTA, 02/19/21 at 12:04 PM         Definitions for assistance levels  Independent = pt does not require any physical supervision or assistance from another person for activity completion. Device may be needed.   Stand by assistance = pt requires verbal cues or instructions from another person, close to but not touching, to perform the activity  Minimal assistance= pt performs 75% or more of the activity; assistance is required to complete the activity  Moderate assistance= pt performs 50% of the activity; assistance is required to complete the activity  Maximal assistance = pt performs 25% of the activity; assistance is required to complete the activity  Dependent = pt requires total physical assistance to accomplish the task

## 2021-02-19 NOTE — CONSULTS
History and Physical  Patient: Junie Conchis  Unit/Bed:W177/W177-01  YOB: 1932  MRN: [id number]  Acct: [de-identified]   Admitting Diagnosis: Acute on chronic combined systolic (congestive) and diastolic (congestive) heart failure ([facility] [address] [I50.43]  Admit Date:  2/9/2021  Hospital Day: 10      Chief Complaint:   Pleural effusion    History of Present Illness:  Patient is being treated in inpatient rehab and developed increasing shortness of breath. She was transferred back to the hospital with congestive heart failure. During her evaluation the CAT scan showed a large left pleural effusion. She has undergone 2 thoracenteses and the fluid has once again recurred. Request was made for definitive drainage procedure and possible biopsies. She is now resting comfortably in her hospital bed. History is obtained from her daughter since the patient does not speak [place].     Allergies   Allergen Reactions    Latex     Tape Lindy Alea Tape]        Current Facility-Administered Medications   Medication Dose Route Frequency Provider Last Rate Last Admin    digoxin (LANOXIN) tablet 125 mcg  125 mcg Oral Daily Joreg Franco, DO   125 mcg at 02/19/21 1013    midodrine (PROAMATINE) tablet 5 mg  5 mg Oral TID  Jaqui Valdez MD   5 mg at 02/19/21 1012    furosemide (LASIX) tablet 20 mg  20 mg Oral Daily Jorge Franco, DO   20 mg at 02/19/21 1013    citalopram (CELEXA) tablet 20 mg  20 mg Oral Daily Malaika Rush MD   20 mg at 02/19/21 1012    levothyroxine (SYNTHROID) tablet 88 mcg  88 mcg Oral Daily Malaika Rush MD   88 mcg at 02/19/21 0558    aspirin chewable tablet 81 mg  81 mg Oral Daily Malaika Rush MD   81 mg at 02/19/21 1012    vitamin B-12 (CYANOCOBALAMIN) tablet 1,000 mcg  1,000 mcg Oral Daily Malaika Rush MD   1,000 mcg at 02/19/21 1013    [Held by provider] dilTIAZem (CARDIZEM CD) extended release capsule 120 mg  120 mg Oral BID Malaika Rush MD   120 mg at 02/14/21 1493  rivaroxaban (XARELTO) tablet 15 mg  15 mg Oral Daily Jorge J Holiday, DO   15 mg at 02/18/21 1654    budesonide-formoterol (SYMBICORT) 160-4.5 MCG/ACT inhaler 2 puff  2 puff Inhalation BID Vera Angelucci, MD   2 puff at 02/19/21 0805    carvedilol (COREG) tablet 3.125 mg  3.125 mg Oral BID Jorge J Holiday, DO   3.125 mg at 02/19/21 1013    LORazepam (ATIVAN) tablet 0.5 mg  0.5 mg Oral Q6H PRN Vera Angelucci, MD        nitroGLYCERIN (NITROSTAT) SL tablet 0.4 mg  0.4 mg Sublingual Q5 Min PRN Vera Angelucci, MD        pantoprazole (PROTONIX) tablet 40 mg  40 mg Oral BID AC Vera Angelucci, MD   40 mg at 02/19/21 0905    potassium chloride (KLOR-CON M) extended release tablet 20 mEq  20 mEq Oral Daily with breakfast Vera Angelucci, MD   20 mEq at 02/19/21 1013    simethicone (MYLICON) chewable tablet 80 mg  80 mg Oral Q6H PRN Vera Angelucci, MD        sodium chloride flush 0.9 % injection 10 mL  10 mL Intravenous 2 times per day Vera Angelucci, MD   10 mL at 02/19/21 1014    sodium chloride flush 0.9 % injection 10 mL  10 mL Intravenous PRN Vera Angelucci, MD   10 mL at 02/10/21 1642    promethazine (PHENERGAN) tablet 12.5 mg  12.5 mg Oral Q6H PRN Vera Angelucci, MD        Or    ondansetron [facility]) injection 4 mg  4 mg Intravenous Q6H PRN Vera Angelucci, MD   4 mg at 02/13/21 0320    polyethylene glycol (GLYCOLAX) packet 17 g  17 g Oral Daily PRN Vera Angelucci, MD        acetaminophen (TYLENOL) tablet 650 mg  650 mg Oral Q6H PRN Vera Angelucci, MD   650 mg at 02/17/21 2312    Or    acetaminophen (TYLENOL) suppository 650 mg  650 mg Rectal Q6H PRN Vera Angelucci, MD           PMHx:  Past Medical History:   Diagnosis Date    Ascending aortic aneurysm ([address] 6/23/2017    Charcot Arleen Tooth muscular atrophy     Depression     Descending aortic aneurysm ([facility] [address] 6/23/2017    Essential hypertension 2/16/2018    Headache     HTN (hypertension)     Impaired mobility and activities of daily living     Lumbar stenosis with neurogenic claudication     Myelopathy (HCC)     Osteoarthritis        PSHx:  Past Surgical History:   Procedure Laterality Date    JOINT REPLACEMENT      THORACENTESIS Left 01/29/2021    total of 755 cc removed per Dr Elisha Madsen specimen sent to lab       Social Hx:  Social History     Socioeconomic History    Marital status:      Spouse name: None    Number of children: None    Years of education: None    Highest education level: None   Occupational History    None   Social Needs    Financial resource strain: None    Food insecurity     Worry: None     Inability: None    Transportation needs     Medical: None     Non-medical: None   Tobacco Use    Smoking status: Never Smoker    Smokeless tobacco: Never Used   Substance and Sexual Activity    Alcohol use: No     Alcohol/week: 0.0 standard drinks    Drug use: No    Sexual activity: None   Lifestyle    Physical activity     Days per week: 0 days     Minutes per session: 0 min    Stress: Only a little   Relationships    Social connections     Talks on phone: More than three times a week     Gets together: More than three times a week     Attends Muslim service: 1 to 4 times per year     Active member of club or organization: No     Attends meetings of clubs or organizations: Never     Relationship status:      Intimate partner violence     Fear of current or ex partner: No     Emotionally abused: No     Physically abused: No     Forced sexual activity: No   Other Topics Concern    None   Social History Narrative    Lives With: Alone, son lives down the street, dtr is in the area    Has a son in the area    Type of Home: [address]: One level    Home Access: Stairs to enter with rails- Number of Steps: 2- Rails: Both    Bathroom Shower/Tub: Tub/Shower unit, Bathroom Equipment: Grab bars in shower, Shower chair    Home Equipment: Rolling walker Cane(Pt infrequemtly uses DME for ambulation and prefers to furniture walk in home)    ADL Assistance: [facility]: Independent    Homemaking Responsibilities: Yes    Ambulation Assistance: Independent, Transfer Assistance: Independent    Additional Comments: Son stops over 2 times daily           Family Hx:  Family History   Problem Relation Age of Onset    Arthritis Mother     Arthritis Father     High Blood Pressure Father        Review ofSystems:   Review of Systems   Constitutional: Positive for activity change and fatigue. Negative for appetite change, chills, diaphoresis, fever and unexpected weight change. HENT: Negative for sore throat, trouble swallowing and voice change. Eyes: Negative for visual disturbance. Respiratory: Positive for shortness of breath. Negative for cough, choking, chest tightness, wheezing and stridor. Cardiovascular: Negative for chest pain, palpitations and leg swelling. Gastrointestinal: Negative for abdominal distention, abdominal pain, diarrhea, nausea and vomiting. Genitourinary: Negative for difficulty urinating. Musculoskeletal: Negative for gait problem and joint swelling. Skin: Negative for rash and wound. Neurological: Negative for dizziness, seizures and light-headedness. Hematological: Negative for adenopathy. Does not bruise/bleed easily. Psychiatric/Behavioral: Negative for behavioral problems and confusion. Physical Examination:    /81   Pulse 68   Temp 97.5 °F (36.4 °C) (Oral)   Resp 18   Ht 5' 3\" (1.6 m)   Wt 157 lb 4.8 oz (71.4 kg)   SpO2 96%   BMI 27.86 kg/m²    Physical Exam  Constitutional:       General: She is not in acute distress. Appearance: She is not ill-appearing, toxic-appearing or diaphoretic. HENT:      Head: Normocephalic and atraumatic. Nose: Nose normal.   Eyes:      Extraocular Movements: Extraocular movements intact.       Conjunctiva/sclera: Conjunctivae normal.      Pupils: Pupils are equal, round, and reactive to light. Neck:      Musculoskeletal: Neck supple. Vascular: No carotid bruit. Cardiovascular:      Rate and Rhythm: Normal rate and regular rhythm. Heart sounds: Normal heart sounds. No murmur. No gallop. Pulmonary:      Effort: Pulmonary effort is normal. No respiratory distress. Breath sounds: Normal breath sounds. No wheezing or rales. Abdominal:      General: Abdomen is flat. Bowel sounds are normal.      Palpations: Abdomen is soft. There is no mass. Tenderness: There is no abdominal tenderness. Musculoskeletal:         General: No swelling. Right lower leg: No edema. Left lower leg: No edema. Lymphadenopathy:      Cervical: No cervical adenopathy. Skin:     General: Skin is warm and dry. Neurological:      General: No focal deficit present. Mental Status: She is alert and oriented to person, place, and time. Psychiatric:         Mood and Affect: Mood normal.         Behavior: Behavior normal.         Thought Content:  Thought content normal.         Judgment: Judgment normal.          LABS:  CBC:   Lab Results   Component Value Date    WBC 9.1 02/19/2021    RBC 3.56 02/19/2021    HGB 10.1 02/19/2021    HCT 31.3 02/19/2021    MCV 88.0 02/19/2021    MCH 28.4 02/19/2021    MCHC 32.3 02/19/2021    RDW 14.4 02/19/2021     02/19/2021     CBC with Differential:   Lab Results   Component Value Date    WBC 9.1 02/19/2021    RBC 3.56 02/19/2021    HGB 10.1 02/19/2021    HCT 31.3 02/19/2021     02/19/2021    MCV 88.0 02/19/2021    MCH 28.4 02/19/2021    MCHC 32.3 02/19/2021    RDW 14.4 02/19/2021    LYMPHOPCT 9.0 01/25/2021    MONOPCT 8.7 01/25/2021    BASOPCT 0.4 01/25/2021    MONOSABS 1.0 01/25/2021    LYMPHSABS 1.0 01/25/2021    EOSABS 0.1 01/25/2021    BASOSABS 0.0 01/25/2021     CMP:    Lab Results   Component Value Date     02/19/2021    K 4.2 02/19/2021    K 4.1 02/13/2021     02/19/2021    CO2 28 02/19/2021    BUN 15 02/19/2021    CREATININE 0.62 02/19/2021    GFRAA >60.0 02/19/2021    LABGLOM >60.0 02/19/2021    GLUCOSE 98 02/19/2021    PROT 6.2 02/16/2021    LABALBU 2.6 02/16/2021    CALCIUM 9.0 02/19/2021    BILITOT 0.3 02/16/2021    ALKPHOS 48 02/16/2021    AST 16 02/16/2021    ALT 14 02/16/2021     BMP:    Lab Results   Component Value Date     02/19/2021    K 4.2 02/19/2021    K 4.1 02/13/2021     02/19/2021    CO2 28 02/19/2021    BUN 15 02/19/2021    LABALBU 2.6 02/16/2021    CREATININE 0.62 02/19/2021    CALCIUM 9.0 02/19/2021    GFRAA >60.0 02/19/2021    LABGLOM >60.0 02/19/2021    GLUCOSE 98 02/19/2021     Magnesium:    Lab Results   Component Value Date    MG 1.7 02/14/2021     Troponin:    Lab Results   Component Value Date    TROPONINI <0.010 02/13/2021     Recent Labs     02/17/21  0619 02/18/21  0602   PROBNP 6,184 1,665     No results for input(s): INR in the last 72 hours. RADIOLOGY:  Ct Chest W Contrast    Result Date: 2/17/2021  CT of the Chest with intravenous contrast medium. HISTORY:  Recurrent pleural effusion  TECHNICAL FACTORS: CT imaging of the chest was obtained and formatted as 5 mm contiguous axial images from the thoracic inlet through the adrenal glands. Sagittal and coronal reconstruction obtained during postprocessing. Intravenous contrast medium:  100 ml of Isovue-300 Comparison:  CTA chest, January 25, 2021, March 15, 2019, July 20, 2007. Chest radiograph, February 16, 2021, February 13, 2021, February 10, 2021. Findings: Right lung: Emphysematous change. No nodules, masses, consolidation, pleural effusion, pneumothorax. Left lung: Emphysematous change. No nodules or masses. Large effusion tracking laterally left chest, and coursing into major fissure, compressing portion left upper lobe. Pleural effusion also identified, left chest space width atelectasis, lingula and left lower lobe. Lymph nodes: No hilar, mediastinal, or axillary lymph node enlargement. Thoracic aorta: AP and transverse dimension, ascending thoracic aorta, at level of pulmonary chill bifurcation, measures 4.4 x 4.5 cm. Descending thoracic aorta tortuous, with AP dimension, 3.8 cm. Cardiac Size: Enlarged. Pericardial effusion: None. Upper abdomen:Limited imaging upper abdomen shows no gross anomaly. Musculoskeletal:No osteoblastic, and no osteolytic lesions. Diffuse disc space narrowing with anterior osteophytes thoracic spine. Moderate left pleural effusion resulting in compressive atelectatic change lingula, left upper, and left lower lobe. Ectasia, ascending and descending thoracic aorta. Cardiomegaly. All CT scans at this facility use dose modulation, iterative reconstruction, and/or weight based dosing when appropriate to reduce radiation dose to as low as reasonably achievable. Assessment:    Active Hospital Problems    Diagnosis Date Noted    Paroxysmal atrial fibrillation [facility]) [I48.0]      Priority: High    Impaired mobility and activities of daily living due to CHF [facility] admit 2021 [Z74.09, Z78.9]      Priority: High    Lumbar stenosis with neurogenic claudication [M48.062] 06/02/2016     Priority: Medium    Acute on chronic combined systolic (congestive) and diastolic (congestive) heart failure (HCC) [I50.43] 02/09/2021    Hypoxia [R09.02]     Osteoarthritis of lumbar spine with myelopathy [M47.16] 06/02/2016     1. Recurrent left pleural effusion of unknown etiology but most likely due to heart failure. CAT scan also shows aortic aneurysm and an enlarged heart. Plan:  1. I went over treatment options with the patient and her daughter. I do not recommend thoracoscopic biopsies given her age and overall poor health. I do not feel any diagnostic tissue would be worth the risk. I discussed placing a Pleurx catheter under local IV sedation. They understand the risk benefits potential outcomes and alternative therapies and agreed to proceed.   Surgical date is February 23.         Electronically signed by Lia Brown MD on 2/19/2021 at 10:57 AM

## 2021-02-19 NOTE — PROGRESS NOTES
converted to normal sinus rhythm overnight. She was initiated on IV digoxin as well as p.o. She is resting comfortably. Normal sinus rhythm at heart rate of 72 bpm on telemetry. She denies any chest pain or shortness of breath. She is on full dose anticoagulation. Zeng remains in place. Blood pressure improved. 2/16/21: Patient complains of left rib/below breast area discomfort. Vague in explanation. Daughter present at bedside. Patient up in bed eating dinner. Zeng remains in place. Status post thoracentesis for left pleural effusion.  -3.4 L total net fluid balance. A. fib on telemetry 115-100 40s, blood pressure is marginal in the 93F to 47J systolic    7/81/92: Nonischemic cardiomyopathy generalized weakness off Levophed started on midodrine very slow recovery    2/14/21: remains on high flow O2. HR drops while sleeping but comes right back up. On and off Levo. Currently off. EKG: AF 74    2/13/21: sitting up in bed eating. On High flow O2 8L. sats 95%. No fever. Off Levo. EKG: AF 74    2/12/21: Sitting up in chair in no distress. Complaining of not sleeping well last night with shortness of breath. On high flow O2 at 7 L with 99% SPO2. Has some pain under her left breast with radiation into her back. Currently on telemetry she appears to be SVT versus a flutter with heart rate in the 140s. Blood pressure remains marginal and due to previous hypotension her Coreg and losartan were placed on hold. She is currently on Cardizem  twice a day for rate management. Given borderline blood pressure and tachycardia at this time will give IV digoxin. Remains on oral anticoagulation with Xarelto 15 mg p.o. daily. Has Zeng catheter in place with blood noted in Zeng bag but clear yellow urine in tubing. On Lasix 20 mg p.o. daily. Has 2400 mL negative fluid balance since 2/10/2021. Urology following due to urinary retention. 2/11/21: Patient complains of dizziness.   She is up at the side of the bed eating breakfast.  Denies any chest pain. Taylor remains in place. Renal function electrolytes are within normal limits. Blood pressure is in the 47Q systolic. A. fib on telemetry heart rate in the 90's  -2.2 L total net fluid balance    2/10/21: Patient is a 80 y.o. female who presents with a chief complaint of SOB. Patient is followed on a regular basis by Dr. Mallory Alexandra MD. Patient with past medical history of SVT status post ablation, descending thoracic aorta aneurysm measuring 4.9 cm, ascending aorta aneurysm measuring 4.3 cm, paroxysmal atrial fibrillation,History of provoked DVT, originally presented to [facility] with significant shortness of breath as well as rapid heartbeat and chest pain. Patient was noted to be in acute decompensated combined heart failure. She was started on IV diuretics. She was also developed paroxysmal atrial fibrillation started on oral anticoagulation. She underwent cardiac catheterization for elevated troponins as well as chest pain and a presentation of acute decompensated heart failure and ejection fraction of 40% was noted to have moderate mid LAD stenosis with negative IFR. Patient underwent left pleural effusion thoracentesis by interventional radiology. She was seen by pulmonary as well. She was transferred to rehab and yesterday developed worsening shortness of breath as well as O2 desaturation and transferred to [facility] for further evaluation. Repeat chest x-ray showed CHF findings and given IV Lasix. Normal sinus rhythm at 60 bpm on telemetry. 2/9/21: Patient developed shortness of breath today and is hypoxic with activity. Unable to participate in rehab. Chest x-ray with vascular congestion/fluid overload/CHF findings. Patient is on IV Lasix 20 mg.   Taylor remains in place and noted to have urinary retention by urology    2/8/21: developed SOB overnight and taylor replaced and had significant urine outpust.   BP is Examination:    /69   Pulse 68   Temp 97.5 °F (36.4 °C) (Oral)   Resp 18   Ht 5' 3\" (1.6 m)   Wt 157 lb 4.8 oz (71.4 kg)   SpO2 95%   BMI 27.86 kg/m²    Physical Exam  Constitutional:       General: She is not in acute distress. Appearance: Normal appearance. HENT:      Head: Normocephalic and atraumatic. Neck:      Musculoskeletal: Normal range of motion and neck supple. Cardiovascular:      Rate and Rhythm: normal rate and regular rhythm. Pulmonary:      Effort: Pulmonary effort is normal. No respiratory distress. Breath sounds: No wheezing, rhonchi or rales. Abdominal:      Palpations: Abdomen is soft. Tenderness: There is no abdominal tenderness. Musculoskeletal: Normal range of motion. Right lower leg: No edema. Left lower leg: No edema. Skin:     General: Skin is warm and dry. Neurological:      General: No focal deficit present. Mental Status: She is alert. Cranial Nerves: No cranial nerve deficit.    Psychiatric:         Mood and Affect: Mood normal.         Behavior: Behavior normal.         LABS:  CBC:   Lab Results   Component Value Date    WBC 11.5 02/18/2021    RBC 3.41 02/18/2021    HGB 9.7 02/18/2021    HCT 29.8 02/18/2021    MCV 87.3 02/18/2021    MCH 28.5 02/18/2021    MCHC 32.7 02/18/2021    RDW 13.9 02/18/2021     02/18/2021     CBC with Differential:   Lab Results   Component Value Date    WBC 11.5 02/18/2021    RBC 3.41 02/18/2021    HGB 9.7 02/18/2021    HCT 29.8 02/18/2021     02/18/2021    MCV 87.3 02/18/2021    MCH 28.5 02/18/2021    MCHC 32.7 02/18/2021    RDW 13.9 02/18/2021    LYMPHOPCT 9.0 01/25/2021    MONOPCT 8.7 01/25/2021    BASOPCT 0.4 01/25/2021    MONOSABS 1.0 01/25/2021    LYMPHSABS 1.0 01/25/2021    EOSABS 0.1 01/25/2021    BASOSABS 0.0 01/25/2021     CMP:    Lab Results   Component Value Date     02/19/2021    K 4.2 02/19/2021    K 4.1 02/13/2021     02/19/2021    CO2 28 02/19/2021    BUN 15 02/19/2021    CREATININE 0.62 02/19/2021    GFRAA >60.0 02/19/2021    LABGLOM >60.0 02/19/2021    GLUCOSE 98 02/19/2021    PROT 6.2 02/16/2021    LABALBU 2.6 02/16/2021    CALCIUM 9.0 02/19/2021    BILITOT 0.3 02/16/2021    ALKPHOS 48 02/16/2021    AST 16 02/16/2021    ALT 14 02/16/2021     BMP:    Lab Results   Component Value Date     02/19/2021    K 4.2 02/19/2021    K 4.1 02/13/2021     02/19/2021    CO2 28 02/19/2021    BUN 15 02/19/2021    LABALBU 2.6 02/16/2021    CREATININE 0.62 02/19/2021    CALCIUM 9.0 02/19/2021    GFRAA >60.0 02/19/2021    LABGLOM >60.0 02/19/2021    GLUCOSE 98 02/19/2021     Magnesium:    Lab Results   Component Value Date    MG 1.7 02/14/2021     Troponin:    Lab Results   Component Value Date    TROPONINI <0.010 02/13/2021       Radiology:      CTA Chest 1/25/21:  Impression       No CT evidence of pulmonary embolism.       Moderate left and small right pleural effusions.       Cardiomegaly and small pericardial effusion. Reflux of contrast into the hepatic veins, associated with right heart failure.       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       No lung consolidative opacity. Areas of probable atelectasis/scarring. Echocardiogram 1/27/21:  Conclusions   Summary   Mitral annular calcification is present. 1+ MR   Left ventricular ejection fraction is visually estimated at 40%. E/A flow reversal noted. Suggestive of diastolic dysfunction. Signature   ----------------------------------------------------------------   Electronically signed by Salma Gonsalves MD(Interpreting   physician) on 01/27/2021 11:37 AM   ----------------------------------------------------------------   Findings  Left Ventricle  Left ventricular ejection fraction is visually estimated at 40%. E/A flow reversal noted. Suggestive of diastolic dysfunction.   Right Ventricle  Normal right ventricle structure and function. Normal right ventricle systolic pressure. Left Atrium  Moderately dilated left atrium. Right Atrium  Moderately enlarged right atrium size. Mitral Valve  Mitral annular calcification is present. 1+ MR  Tricuspid Valve  Normal tricuspid valve structure and function. 1-2+ TR  RVSP 38 mmHg  Aortic Valve  Aortic valve leaflets are moderately thickened. No AS  No AR  Pulmonic Valve  The pulmonic valve was not well visualized . Pericardial Effusion  No evidence of pericardial effusion. Pleural Effusion  No evidence of pleural effusion. EKG 1/28/21: SR 77, ST depression with T wave inversion V1-V6, QTc 497ms    Telemetry 2/12/21: -140s        Assessment:    Active Hospital Problems    Diagnosis Date Noted    Paroxysmal atrial fibrillation ([facility] [address] [I48.0]      Priority: High    Impaired mobility and activities of daily living due to CHF [facility] admit 2021 [Z74.09, Z78.9]      Priority: High    Lumbar stenosis with neurogenic claudication [M48.062] 06/02/2016     Priority: Medium    Acute on chronic combined systolic (congestive) and diastolic (congestive) heart failure (HCC) [I50.43] 02/09/2021    Hypoxia [R09.02]     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 Acute respiratory failure   2. Acute systolic CHF--improving  3. New onset NICMP EF 40% per echo 1/27/21  4. Nonobstructive CAD (60% mid LAD with negative IFR) per [facility] 2/1/21  5. Moderate left pleural effusion s/p left thoracentesis with aspiration 755mL pleural fluid on 1/29/21; recurrent pleural effusions status post repeat thoracentesis on 2/16/2021  6. Hx HTN--currently low BP  7. Ascending thoracic aorta measuring around 5.0cm per CTA chest 1/25/21--similar to CTA chest 3/15/19  8. Descending thoracic aortic aneurysm measuring up to 4.8cm per CTA chest 1/25/21--similar to prior CTA chest 3/15/19  9. Anemia--stable  10. Hx of SVT ablation  11.  PA-fib RVR--currently SVT vs A-flutter RVR       Plan:  1. Maximize medical therapy-aspirin 81 mg p.o. daily, Coreg 3.125 mg p.o. twice daily, digoxin 125 mcg p.o. daily, Lasix 20 mg p.o. daily, potassium chloride 20 mEq p.o. daily, oral anticoagulation with Xarelto 15 mg p.o. daily  2. Cardiac/less than 2 g sodium diet recommended  3. 1800 mL daily fluid restriction  4. Check daily weight and strict intake and output  5. Monitor on telemetry for any tachycardia or bradycardia arrhythmias  6. Maintain potassium greater than 4, magnesium greater than 2  7. GI/DVT prophylaxis  8. Will need at least annual CT chest for reevaluation of ascending and descending thoracic aortic aneurysms which were stable in size on CTA of the chest from 1/25/2021 compared to prior CT of the chest from 3/15/2019  9. Urology recommendations--patient presently has Zeng catheter in place due to urinary retention  10. Thoracic surgery recommendations regarding recurrent pleural effusion  11. Pulmonology recommendations  12. Internal medicine recommendations  13. Consider outpatient event monitor in future for further evaluation of paroxysmal A. fib RVR  14. Recommend outpatient follow-up with CHF clinic upon discharge  15. Plan for [facility] rehab upon discharge  16. Further recommendations to follow        Electronically signed by ASHLEY Colindres on 2/19/2021 at 9:30 AM      Attending Supervising [de-identified] Attestation Statement  The patient is a 80 y.o. female. I have performed a history and physical examination of the patient. I discussed the case with the physician assistant. I reviewed the patient's Past Medical History, Past Surgical History, Medications, and Allergies.      Physical Exam:  Vitals:    02/18/21 1940 02/19/21 0621 02/19/21 0739 02/19/21 1431   BP:   126/81    Pulse:   68 71   Resp:   18 18   Temp:   97.5 °F (36.4 °C) 98 °F (36.7 °C)   TempSrc:   Oral Oral   SpO2: 95%  96%    Weight:  157 lb 4.8 oz (71.4 kg)     Height: Pulmonary/Chest: clear to auscultation bilaterally- no wheezes, rales or rhonchi, normal air movement, no respiratory distress  Cardiovascular: normal rate, normal S1 and S2, no gallops, intact distal pulses and no carotid bruits  Abdomen: soft, non-tender, non-distended, normal bowel sounds, no masses or organomegaly    Active Hospital Problems    Diagnosis Date Noted    Paroxysmal atrial fibrillation (HCC) [I48.0]      Priority: High    Impaired mobility and activities of daily living due to CHF ex, [facility] admit 2021 [Z74.09, Z78.9]      Priority: High    Lumbar stenosis with neurogenic claudication [M48.062] 06/02/2016     Priority: Medium    Acute on chronic combined systolic (congestive) and diastolic (congestive) heart failure (HCC) [I50.43] 02/09/2021     Priority: Low    Hypoxia [R09.02]      Priority: Low    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 ASSESSMENT:     Acute on chronic decompensated combined congestive heart failure. -Improved  NICMP EF of 40%. Paroxysmal atrial fibrillation-sinus rhythm today. History of coronary disease- Mod LAD disease, negative IFR. History of SVT status post ablation    Essential hypertension   Left pleural effusion status post repeat thoracentesis. Ascending thoracic aorta measuring around 5.0cm per CTA chest 1/25/21--similar to CTA chest 3/15/19  Descending thoracic aortic aneurysm measuring up to 4.8cm per CTA chest 1/25/21--similar to prior CTA chest 3/15/19     PLAN:   1. As always, aggressive risk factor modification is strongly recommended. We should adhere to the JNC VIII guidelines for HTN management and the NCEPATP III guidelines for LDL-C management. 2. Maintain taylor./Urology recommendation  3. PO lasix given low BP/dizziness, monitor I's and O's and renal function.    4. Max cardiac meds-holding ARB, CCB due to hypotension. Continue with Coreg 3.125 mg twice daily. 5. Continue with p.o. digoxin given history of paroxysmal A. fib with RVR and hypotension. 6. Continue with Xarelto 15 mg daily. 7. Pulmonary and thoracic surgery recommendations  8. CHF teaching and follow up in CHF clinic post discharge  9. Low sodium diet. 10. Urology evaluation/recs regarding urinary retention.    6. Okay to transfer to rehab          Electronically signed by Breonna Estevez DO on 2/19/21 at 6:47 PM EST

## 2021-02-19 NOTE — PROGRESS NOTES
INPATIENT PROGRESS NOTES    PATIENT NAME: Macey Smith  MRN: [id number]  SERVICE DATE:  February 19, 2021   SERVICE TIME:  10:36 AM      PRIMARY SERVICE: Pulmonary Disease    CHIEF COMPLAINTS: Pleural effusion    INTERVAL HPI: Patient seen and examined at bedside, Interval Notes, orders reviewed. Nursing notes noted    Feels good, minimal cough mainly at night, no chest pain, no shortness of breath, no nausea no vomiting, no fever. Review of system:     GI Abdominal pain No  Skin Rash No    Social History     Tobacco Use    Smoking status: Never Smoker    Smokeless tobacco: Never Used   Substance Use Topics    Alcohol use: No     Alcohol/week: 0.0 standard drinks         Problem Relation Age of Onset    Arthritis Mother     Arthritis Father     High Blood Pressure Father          OBJECTIVE    Body mass index is 27.86 kg/m². PHYSICAL EXAM:  Vitals:  /81   Pulse 68   Temp 97.5 °F (36.4 °C) (Oral)   Resp 18   Ht 5' 3\" (1.6 m)   Wt 157 lb 4.8 oz (71.4 kg)   SpO2 96%   BMI 27.86 kg/m²     General: alert, cooperative, no distress  Head: normocephalic, atraumatic  Eyes:No gross abnormalities. ENT:  MMM no lesions  Neck:  supple and no masses  Chest : Good air movement, minimal basal rales, no wheezing, nontender, tympanic  Heart[de-identified] Heart sounds are normal.  Regular rate and rhythm without murmur, gallop or rub. ABD:  symmetric, soft, non-tender, no guarding or rebound  Musculoskeletal : no cyanosis, no clubbing and no edema  Neuro:  Grossly normal  Skin: No rashes or nodules noted.   Lymph node:  no cervical nodes  Urology: No Zeng   Psychiatric: appropriate    DATA:   Recent Labs     02/18/21  0602 02/19/21  0917   WBC 11.5* 9.1   HGB 9.7* 10.1*   HCT 29.8* 31.3*   MCV 87.3 88.0    278     Recent Labs     02/18/21  0602 02/19/21  0656    140   K 4.2 4.2    103   CO2 27 28   BUN 21 15   CREATININE 0.76 0.62   GLUCOSE 131* 98   CALCIUM 8.7 9.0   LABGLOM >60.0 >60.0   GFRAA Study Location: Portable Technical Quality: Adequate visualization Indications:Congestive heart failure. Patient Status: Routine Height: 64 inches Weight: 165 pounds BSA: 1.8 m^2 BMI: 28.32 kg/m^2 BP: 108/77 mmHg  Conclusions  Summary  Mitral annular calcification is present. 1+ MR  Left ventricular ejection fraction is visually estimated at 40%. E/A flow reversal noted. Suggestive of diastolic dysfunction. Signature  ----------------------------------------------------------------  Electronically signed by Kian Benavides MD(Interpreting  physician) on 01/27/2021 11:37 AM  ----------------------------------------------------------------  Findings Left Ventricle Left ventricular ejection fraction is visually estimated at 40%. E/A flow reversal noted. Suggestive of diastolic dysfunction. Right Ventricle Normal right ventricle structure and function. Normal right ventricle systolic pressure. Left Atrium Moderately dilated left atrium. Right Atrium Moderately enlarged right atrium size. Mitral Valve Mitral annular calcification is present. 1+ MR Tricuspid Valve Normal tricuspid valve structure and function. 1-2+ TR RVSP 38 mmHg Aortic Valve Aortic valve leaflets are moderately thickened. No AS No AR Pulmonic Valve The pulmonic valve was not well visualized . Pericardial Effusion No evidence of pericardial effusion. Pleural Effusion No evidence of pleural effusion. Aorta \ Miscellaneous The aorta is within normal limits. M-Mode Measurements (cm)  LVIDd: 4.39 cm                         LVIDs: 3.55 cm  IVSd: 1.43 cm                          IVSs: 1.55 cm  LVPWd: 1.5 cm                          LVPWs: 1.48 cm  Rt. Vent.  Dimension: 3.74 cm           AO Root Dimension: 3.62 cm                                         ACS: 1.25 cm                                         LA: 4.54 cm                                         LVOT: 2.04 cm Doppler Measurements:  AV Velocity:0.02 m/s                    MV Peak E-Wave: 0.85 m/s  AV Peak Gradient: 10.44 mmHg            MV Peak A-Wave: 0.87 m/s  AV Mean Gradient: 6.01 mmHg  AV Area (Continuity):1.83 cm^2  TR Velocity:2.62 m/s                    Estimated RAP:10 mmHg  TR Gradient:27.54 mmHg                  RVSP:37.54 mmHg Valves  Mitral Valve  Peak E-Wave: 0.85 m/s                 Peak A-Wave: 0.87 m/s                                        E/A Ratio: 0.97                                        Peak Gradient: 2.88 mmHg                                        Deceleration Time: 232.8 msec  Tissue Doppler  E' Septal Velocity: 0.07 m/s  E' Lateral Velocity: 0.08 m/s  Aortic Valve  Peak Velocity: 1.62 m/s                Mean Velocity: 1.15 m/s  Peak Gradient: 10.44 mmHg              Mean Gradient: 6.01 mmHg  Area (continuity): 1.83 cm^2           Area (2D): 1.8 cm^2  AV VTI: 28.8 cm  Cusp Separation: 1.25 cm  Tricuspid Valve  Estimated RVSP: 37.54 mmHg              Estimated RAP: 10 mmHg  TR Velocity: 2.62 m/s                   TR Gradient: 27.54 mmHg  Pulmonic Valve  Peak Velocity: 0.96 m/s           Peak Gradient: 3.66 mmHg                                    Estimated PASP: 37.54 mmHg  LVOT  Peak Velocity: 0.89 m/s              Mean Velocity: 0.57 m/s  Peak Gradient: 3.16 mmHg             Mean Gradient: 1.58 mmHg  LVOT Diameter: 2.04 cm               LVOT VTI: 16.15 cm Structures  Left Atrium  LA Dimension: 4.54 cm                        LA Area: 11.81 cm^2  LA/Aorta: 1.25  LA Volume/Index: 44.64 ml /25 m^2  Left Ventricle  Diastolic Dimension: 2.30 cm         Systolic Dimension: 5.76 cm  Septum Diastolic: 3.01 cm            Septum Systolic: 2.68 cm  PW Diastolic: 1.5 cm                 PW Systolic: 8.80 cm                                       FS: 19.1 %  LV EDV/LV EDV Index: 87.32 ml/49 m^2 LV ESV/LV ESV Index: 52.68 ml/29 m^2  EF Calculated: 39.7 %                LV Length: 6.12 cm  LVOT Diameter: 2.04 cm  Right Atrium  RA Systolic Pressure: 10 mmHg  Right Ventricle  Diastolic Dimension: 3.62 cm RV Systolic Pressure: 66.31 mmHg Aorta/ Miscellaneous Aorta  Aortic Root: 3.62 cm  LVOT Diameter: 2.04 cm    Xr Chest (2 Vw)    1. No evidence of pneumothorax. Resolution of left pleural effusion. Cardiac silhouette remains enlarged. Left lower lobe hazy opacity may represent atelectasis versus pneumonia or infiltrate. COMPARISON: No prior studies available for comparison. DIAGNOSIS:  Post left thoracentesis. Dr. Kate Baker to read. COMMENTS: I50.43 Acute on chronic combined systolic and diastolic CHF (congestive heart failure) (HCC) ICD10 TECHNIQUE: XR CHEST (2 VW) FINDINGS: Left lower lobe opacity may represent atelectasis versus pneumonia or infiltrate. Interval resolution of left pleural effusion. No evidence of pneumothorax. Cardiac silhouette is enlarged. Visualized soft tissues, and osseous structures are unremarkable. Cta Chest W Wo Contrast    Result Date: 1/26/2021  EXAMINATION:  CHEST CTA WITH CONTRAST (PULMONARY EMBOLISM PROTOCOL) CLINICAL HISTORY:   PE   I50.43 Acute on chronic combined systolic and diastolic CHF (congestive heart failure) ([address] ICD10. Technique:  Spiral axial CT acquisition of the chest from the thoracic inlet to the upper abdomen following IV contrast.  2-D images were reconstructed in the sagittal and coronal planes. 3-D MIPS images were generated in the coronal and axial planes. Images were reviewed on the PACS workstation. All images including the 3-D MIPS were personally archived. Contrast:  IV administration of 100 ml Isovue 300 All CT scans at this facility use dose modulation, iterative reconstruction, and/or weight based dosing when appropriate to reduce radiation dose to as low as reasonably achievable. Comparison:  CTA chest 3/15/2019  RESULT: Limitations: Motion artifact. Evaluation for thromboembolic disease:      - Right heart chambers:  No thromboembolic disease.      - Main pulmonary arteries:  No thromboembolic disease.       - abdominal pain. Epigastric pain. epigastric pain  COMPARISON: None available. TECHNIQUE: Contiguous axial CT sections of the abdomen and pelvis. 100 cc's of IV contrast given. .  All CT scans at this facility use dose modulation, iterative reconstruction, and/or weight based dosing when appropriate to reduce radiation dose to as low as reasonably achievable. FINDINGS   Liver: Small hypodensities in the right hepatic lobe the largest measuring 8 mm consistent with small cysts. Spleen: Negative    Pancreas: Negative     Kidney: Negative   Adrenal glands are negative. Bowel: Negative    Nodes: No lymphadenopathy. Aorta: Dilated descending thoracic aorta maximum diameter approximately 4 cm adjacent appearance from the CT scans from January 25, 2021. No infrarenal abdominal aortic aneurysm. Peritoneum: No free fluid or free air. The abdominal wall is intact. Pelvis : 9.4 x 6.5 x 6.6 cm cystic mass in the left adnexa. This appears to be a chronic finding this patient was present on an MRI scan that included this area back in April 2016. Bladder catheter in place with decompressed bladder. Bones: No acute osseous abnormalities. Lung bases: Persistent bibasilar effusions not atelectasis left greater than right. PERSISTENT BIBASILAR PLEURAL-PARENCHYMAL CHANGES. NO ACUTE PATHOLOGY IN THE ABDOMEN OR PELVIS. Xr Chest Portable    1. No evidence of pneumothorax. Near complete resolution of the left pleural effusion. Persistent left lower lobe opacification, may represent pneumonia, though underlying nodule or mass cannot be excluded. There is vascular congestion consistent with mild pulmonary edema.  COMPARISON: February 13, 2021 DIAGNOSIS:  post left thoracentesis- show Dr Erica Wilson: I50.43 Acute on chronic combined systolic (congestive) and diastolic (congestive) heart failure ([address] ICD10 TECHNIQUE: XR CHEST PORTABLE FINDINGS: Left lower lobe opacity may represent pneumonia though underlying nodule or mass cannot be excluded. No evidence of pneumothorax. Near complete resolution of the left pleural effusion. Increase in vascular congestion, consistent with pulmonary edema. Cardiac silhouette remains enlarged. Visualized soft tissues, and osseous structures are unremarkable. Xr Chest Portable    Result Date: 2/13/2021  EXAMINATION: XR CHEST PORTABLE. DATE AND TIME:2/13/2021 3:16 AM CLINICAL HISTORY: Shortness of breath   hypoxia  COMPARISONS: February 10, 2021  FINDINGS: Persistent extensive opacity throughout the left hemithorax consistent with effusion and consolidation. Minimal pleural parenchymal changes at the right base. Overall findings are unchanged     No significant change     Xr Chest Portable    Result Date: 2/10/2021  EXAMINATION: CHEST PORTABLE VIEW  CLINICAL HISTORY: Hypoxia COMPARISONS: February 8, 2021 0831 hours  FINDINGS: Single  views of the chest is submitted. The cardiac silhouette is enlarged Pulmonary vascular remains mildly congested. Right sided trachea. Patchy to coalescent infiltrate within the right lower lobe as well as an area of infiltrate/consolidation left lower lobe with trace right pleural effusion and left pleural effusion. .  No Pneumothoraces. PULMONARY VASCULAR REMAINS MILDLY CONGESTED. COULD SUGGEST COMPONENT OF UNDERLYING CHF. CORRELATE CLINICALLY PATCHY TO COALESCENT INFILTRATE WITHIN THE RIGHT LOWER LOBE AS WELL AS AN AREA OF INFILTRATE/CONSOLIDATION LEFT LOWER LOBE WITH TRACE RIGHT PLEURAL EFFUSION AND LEFT PLEURAL EFFUSION. [place] [facility] Chest Portable    Result Date: 2/8/2021  Exam: XR CHEST PORTABLE History: Shortness of breath. Chest pain. Technique: AP portable view of the chest obtained. Comparison: Portable chest radiograph from February 7, 2021 Findings: Heart size is enlarged. Small bilateral pleural effusions. Bibasilar opacities, left greater than right.  No 5:41 PM COMPARISONS: 7/24/2017 FINDINGS: The heart is moderately enlarged, similar to prior exam.  Prominent interstitial markings, consistent with increasing Central vascular congestion. The costophrenic angles are blunted secondary to pleural effusions bilaterally, left greater than  right. No pneumothorax. There are infiltrates/atelectasis within lung bases, left greater than right. There are moderate degenerative changes in spine. CARDIAC ENLARGEMENT WITH CENTRAL VESSEL CONGESTION AND BILATERAL PLEURAL EFFUSIONS, POSSIBLE CONGESTIVE HEART FAILURE. BIBASILAR INFILTRATES/ATELECTASIS, LEFT GREATER THAN RIGHT. Ir Guided Thoracentesis Pleural    Technically successful ultrasound-guided thoracentesis without immediate complications. HISTORY: Hansel Mariscal is a Female of 80 years age. DIAGNOSIS: Left pleural effusion COMMENTS: I50.43 Acute on chronic combined systolic (congestive) and diastolic (congestive) heart failure (HCC) ICD10 PROCEDURE: Following the discussion of procedure, risks versus benefits, possible complications, informed consent was obtained. Following universal protocol, patient and site verification was performed with a \"timeout\" prior to the procedure. Brief survey of the patient's left hemithorax demonstrate moderate amount of pleural effusion. After selection of an appropriate site for thoracentesis, the thoracic skin was prepped and draped in usual sterile fashion. Under ultrasound guidance, using lidocaine for local anesthesia, a 19-gauge Yueh catheter was inserted in the pleural cavity until effusion was aspirated. Using Vacutainer bottles, 730 mL of effusion was drained. The catheter was removed and the aspiration site dressed. The patient tolerated procedure well. No immediate complications were identified. Subsequent chest radiograph demonstrated no evidence of pneumothorax. The patient left the radiology department in stable condition.   Radiology nurse monitored patient's  vital signs throughout the procedure which lasted approximately 30 minutes. Ir Guided Thoracentesis Pleural    Technically successful ultrasound-guided thoracentesis without immediate complications. HISTORY: Na Huang is a Female of 80 years age. DIAGNOSIS: Left pleural effusion COMMENTS: I50.43 Acute on chronic combined systolic and diastolic CHF (congestive heart failure) (HCC) ICD10 PROCEDURE: Following the discussion of procedure, risks versus benefits, possible complications, informed consent was obtained. Following universal protocol, patient and site verification was performed with a \"timeout\" prior to the procedure. Brief survey of the patient's left hemithorax demonstrate moderate amount of pleural effusion. After selection of an appropriate site for thoracentesis, the thoracic skin was prepped and draped in usual sterile fashion. Under ultrasound guidance, using lidocaine for local anesthesia, a 19-gauge Diagnostic Biochipseh catheter was inserted in the pleural cavity until effusion was aspirated. Using Vacutainer bottles, 755 mL of clear yellow effusion was drained. The catheter was removed and the aspiration site dressed. The patient tolerated procedure well. No immediate complications were identified. Subsequent chest radiograph demonstrated no evidence of pneumothorax. The patient left the radiology department in stable condition. Radiology nurse monitored patient's  vital signs throughout the procedure which lasted approximately 30 minutes. 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  Fib    Recommendation  · Recommend Appreciate thoracic surgery, recommend pleural biopsy and Pleurx  · O2 to keep sat 90 to 92%  · Incentive spirometry and flutter device  · Encourage activity Electronically signed by Eric Rios MD,  FCCP   on 2/19/2021 at 10:36 AM

## 2021-02-19 NOTE — PROGRESS NOTES
Subjective: The patient complains of severe acute on chronic progressive fatigue and pain on exertion shortness of breath partially relieved by rest, PT, OT and meds and exacerbated by exertion and recent illness. I am concerned about patients medical complexities. She is now on a medical floor but still has cardiac issues nursing note noted below. Reviewed recent nursing note, \"   \". She may need a VATS procedure. I discussed her medical progress and her rehabilitation potential with her daughter. I confirmed via review of pulmonology note that she indeed will likely undergo a VATS procedure. Thoracic surgery has been consulted. ROS x10: The patient also complains of severely impaired mobility and activities of daily living. Otherwise no new problems with vision, hearing, nose, mouth, throat, dermal, cardiovascular, GI, , pulmonary, musculoskeletal, psychiatric or neurological. See Rehab consult on Rehab chart . Vital signs:  /69   Pulse 68   Temp 97.5 °F (36.4 °C) (Oral)   Resp 18   Ht 5' 3\" (1.6 m)   Wt 157 lb 4.8 oz (71.4 kg)   SpO2 95%   BMI 27.86 kg/m²   I/O:   PO/Intake:     No problems with PO intake, low-sodium no milk. Bowel/Bladder:   continent,    General:  Patient is well developed, adequately nourished, non-obese and     well kempt. HEENT:    PERRLA, hearing intact to loud voice, external inspection of ear     and nose benign. Inspection of lips, tongue and gums benign  Musculoskeletal: No significant change in strength or tone. All joints stable. Inspection and palpation of digits and nails show no clubbing,       cyanosis or inflammatory conditions. Neuro/Psychiatric: Affect: flat-  Alert and oriented to self and     Situation with  min cues. No significant change in deep tendon reflexes or     sensation  Lungs:  Diminished, CTA-B. Respiration effort is normal at rest.  MOLINA  Heart:   S1 = S2,    irreg . No loud murmurs.     Abdomen:  Soft, non-tender, no enlargement of liver or spleen. Extremities:  No significant lower extremity edema or tenderness. Skin:    BUE bruises dt blood draws, no visualized or palpated problems. Rehabilitation:  Physical therapy: FIMS:  Bed Mobility: Scooting: Supervision    Transfers: Sit to Stand: Stand by assistance, Contact guard assistance  Stand to sit: Contact guard assistance, Stand by assistance  Bed to Chair: Stand by assistance, Ambulation 1  Surface: level tile  Device: Rolling Walker  Other Apparatus: O2  Assistance: Contact guard assistance, Stand by assistance  Quality of Gait: mild trunk flexion, steppage gt pattern to clear floor  Gait Deviations: Slow Lea, Increased JAMES  Distance: 30 feet x 2 with turns  Comments: steady, with turning with 2ww,      FIMS:  ,        Occupational therapy: FIMS:   ,  , Assessment: Pt is an 81 y/o female from home alone who presents to [facility] with the above deficits which impact her independence and safety during ADLs. Pt limited d/t fatigue, weakness and decreased endurance. Pt would benefit from OT  to maximize independence and safety during ADLs.     Speech therapy: FIMS:        Lab/X-ray studies reviewed, analyzed and discussed with patient and staff:   Recent Results (from the past 24 hour(s))   EKG 12 Lead    Collection Time: 02/19/21  3:00 AM   Result Value Ref Range    Ventricular Rate 69 BPM    Atrial Rate 69 BPM    P-R Interval 204 ms    QRS Duration 142 ms    Q-T Interval 424 ms    QTc Calculation (Bazett) 454 ms    P Axis 60 degrees    R Axis -65 degrees    T Axis -7 degrees   Basic Metabolic Panel    Collection Time: 02/19/21  6:56 AM   Result Value Ref Range    Sodium 140 135 - 144 mEq/L    Potassium 4.2 3.4 - 4.9 mEq/L    Chloride 103 95 - 107 mEq/L    CO2 28 20 - 31 mEq/L    Anion Gap 9 9 - 15 mEq/L    Glucose 98 70 - 99 mg/dL    BUN 15 8 - 23 mg/dL    CREATININE 0.62 0.50 - 0.90 mg/dL    GFR Non-African American >60.0 >60    GFR  >60.0 >60    Calcium 9.0 8.5 - 9.9 mg/dL   SPECIMEN REJECTION    Collection Time: 02/19/21  7:54 AM   Result Value Ref Range    Rejected Test CBCND     Reason for Rejection see below        Previous extensive, complex labs, notes and diagnostics reviewed and analyzed. ALLERGIES:    Allergies as of 02/09/2021 - Review Complete 02/09/2021   Allergen Reaction Noted    Latex  07/19/2017    Tape [adhesive tape]  06/28/2016      (please also verify by checking [facility])     Complex Physical Medicine & Rehab Issues Assess & Plan:   1. Severe abnormality of gait and mobility and impaired self-care and ADL's secondary to progressive toxic encephalopathy due to recent CHF and COPD exacerbation. Functional and medical status reassessed regarding patients ability to participate in therapies and patient found to be able to participate in  acute intensive comprehensive inpatient rehabilitation program including PT/OT to improve balance, ambulation, ADLs, and to improve the P/AROM. It is my opinion that they will be able to tolerate 3 hours of therapy a day and benefit from it at an acute level. 2. Bowel constipation and Bladder dysfunction overactive bladder:  frequent toileting, ambulate to bathroom with assistance, check post void residuals. Check for C.difficile x1 if >2 loose stools in 24 hours, continue bowel & bladder program.  Monitor for UTI symptoms including lethargy and confusion  3. Severe mid and low back pain and generalized OA pain: reassess pain every shift and prior to and after each therapy session, give prn  Tylenol, modalities prn in therapy, consider Lidoderm, K-pad prn.   4. Skin breakdown risk:  continue pressure relief program.  Daily skin exams and reports from nursing. 5. Severe fatigue due to immobility and nutritional deficits: Add vitamin B12 vitamin D and CoQ10 titrate dosing and add protein supplementation with low carb content.   6. Complex discharge planning:   Await medical stability  status post pleurocentesis. May need VATS. Thoracic surgery is consulted. Complex Active General Medical Issues that complicate care Assess & Plan:     1. Active Problems:    Impaired mobility and activities of daily living due to CHF exac, [facility] admit 2021    Paroxysmal atrial fibrillation (HCC)    Lumbar stenosis with neurogenic claudication    Osteoarthritis of lumbar spine with myelopathy    Hypoxia    Acute on chronic combined systolic (congestive) and diastolic (congestive) heart failure ([address]  Resolved Problems:    * No resolved hospital problems.  Columbuslaurence Orosco D.O., PM&R     Attending    [address]

## 2021-02-19 NOTE — CARE COORDINATION
The hospitalist talked to [facility], regarding the patient going to rehab today. The patient is scheduled for the pleurex drain on 2/23 per the hospitalist.  [facility], to talk to Dr. Lamonte Rivas and call the [facility]  back.  Electronically signed by JACKLYN Ribeiro on 2/19/21 at 12:10 PM EST

## 2021-02-19 NOTE — DISCHARGE SUMMARY
Please refer to chart if no studies are shown here    Xr Chest Portable    Result Date: 2/10/2021  EXAMINATION: CHEST PORTABLE VIEW  CLINICAL HISTORY: Hypoxia COMPARISONS: February 8, 2021 0831 hours  FINDINGS: Single  views of the chest is submitted. The cardiac silhouette is enlarged Pulmonary vascular remains mildly congested. Right sided trachea. Patchy to coalescent infiltrate within the right lower lobe as well as an area of infiltrate/consolidation left lower lobe with trace right pleural effusion and left pleural effusion. .  No Pneumothoraces. PULMONARY VASCULAR REMAINS MILDLY CONGESTED. COULD SUGGEST COMPONENT OF UNDERLYING CHF. CORRELATE CLINICALLY PATCHY TO COALESCENT INFILTRATE WITHIN THE RIGHT LOWER LOBE AS WELL AS AN AREA OF INFILTRATE/CONSOLIDATION LEFT LOWER LOBE WITH TRACE RIGHT PLEURAL EFFUSION AND LEFT PLEURAL EFFUSION. Lawrance Potter Discharge Medications:       Felecia Rodriguez Medication Instructions HYQ:[id number]    Printed on:02/19/21 5207   Medication Information                      aspirin 81 MG tablet  Take 81 mg by mouth daily              Boswellia-Glucosamine-Vit D (OSTEO BI-FLEX ONE PER DAY) TABS  Take by mouth daily             Calcium Carbonate-Vitamin D (CALTRATE 600+D PO)  Take by mouth daily             citalopram (CELEXA) 20 MG tablet  Take 20 mg by mouth daily              dilTIAZem (CARDIZEM CD) 120 MG extended release capsule  Take 1 capsule by mouth 2 times daily             hydrALAZINE (APRESOLINE) 10 MG tablet  TAKE 1 TABLET IN THE EVENING.  repeat dose IN THE MORNING if systolic >924             KLOR-CON M20 20 MEQ extended release tablet  TAKE 1 TABLET DAILY WITH BREAKFAST (MUST CALL OFFICE FOR FOLLOW UP)             losartan (COZAAR) 50 MG tablet  Take 50 mg by mouth 2 times daily              Multiple Vitamins-Minerals (CENTRUM SILVER ULTRA WOMENS) TABS  Take by mouth daily SYNTHROID 88 MCG tablet  Take 88 mcg by mouth daily              vitamin B-12 (CYANOCOBALAMIN) 1000 MCG tablet  Take 1,000 mcg by mouth daily                 Disposition:   If discharged to Home, Any [facility] needs that were indicated and/or required as been addressed and set up by Social Work. Condition at discharge: good     Activity: activity as tolerated    Total time taken for discharging this patient: 40 minutes. Greater than 70% of time was spent focused exclusively on this patient. Time was taken to review chart, discuss plans with consultants, reconciling medications, discussing plan answering questions with patient.      Ludy Perez  2/19/2021, 3:56 PM  ----------------------------------------------------------------------------------------------------------------------    Onel Ott

## 2021-02-19 NOTE — CARE COORDINATION
Received call from [facility] and pt is going to room 247. Reviewed IMM with son Kalen Castellanos and he is pleased for her to go to Rehab. He states she knows everyone there. I also sent p/s message to Dr. Viky Lambert to let him know rehab can take her today.

## 2021-02-19 NOTE — CARE COORDINATION
Case reviewed with Dr Meron Khan and MURALI Nixon Cha-Per Attending, patient will not be having VATS/pleurodesis, but will be having pleurex drain placed next week and is stable for discharge to Acute Rehab today. Dr Meron Khan and Hussain Moulton accepting of patient and she can transfer to room 247 pending negative Covid. Jess VILLASENOR notified.  Electronically signed by Zenaida Johnston RN on 2/19/21 at 2:08 PM EST

## 2021-02-20 LAB
ANION GAP SERPL CALCULATED.3IONS-SCNC: 9 MEQ/L (ref 9–15)
BACTERIA: ABNORMAL /HPF
BUN BLDV-MCNC: 13 MG/DL (ref 8–23)
CALCIUM SERPL-MCNC: 8.7 MG/DL (ref 8.5–9.9)
CHLORIDE BLD-SCNC: 102 MEQ/L (ref 95–107)
CO2: 27 MEQ/L (ref 20–31)
CREAT SERPL-MCNC: 0.58 MG/DL (ref 0.5–0.9)
EPITHELIAL CELLS, UA: ABNORMAL /HPF (ref 0–5)
GFR AFRICAN AMERICAN: >60
GFR NON-AFRICAN AMERICAN: >60
GLUCOSE BLD-MCNC: 92 MG/DL (ref 70–99)
HCT VFR BLD CALC: 30.3 % (ref 37–47)
HEMOGLOBIN: 9.9 G/DL (ref 12–16)
HYALINE CASTS: ABNORMAL /HPF (ref 0–5)
MCH RBC QN AUTO: 28.7 PG (ref 27–31.3)
MCHC RBC AUTO-ENTMCNC: 32.6 % (ref 33–37)
MCV RBC AUTO: 87.9 FL (ref 82–100)
PDW BLD-RTO: 14.4 % (ref 11.5–14.5)
PLATELET # BLD: 262 K/UL (ref 130–400)
POTASSIUM SERPL-SCNC: 3.9 MEQ/L (ref 3.4–4.9)
RBC # BLD: 3.44 M/UL (ref 4.2–5.4)
RBC UA: >100 /HPF (ref 0–5)
SODIUM BLD-SCNC: 138 MEQ/L (ref 135–144)
WBC # BLD: 7.6 K/UL (ref 4.8–10.8)
WBC UA: >100 /HPF (ref 0–5)

## 2021-02-20 PROCEDURE — 2580000003 HC RX 258: Performed by: INTERNAL MEDICINE

## 2021-02-20 PROCEDURE — 2700000000 HC OXYGEN THERAPY PER DAY

## 2021-02-20 PROCEDURE — 81001 URINALYSIS AUTO W/SCOPE: CPT

## 2021-02-20 PROCEDURE — 6370000000 HC RX 637 (ALT 250 FOR IP): Performed by: INTERNAL MEDICINE

## 2021-02-20 PROCEDURE — 97530 THERAPEUTIC ACTIVITIES: CPT

## 2021-02-20 PROCEDURE — 93005 ELECTROCARDIOGRAM TRACING: CPT | Performed by: INTERNAL MEDICINE

## 2021-02-20 PROCEDURE — 94640 AIRWAY INHALATION TREATMENT: CPT

## 2021-02-20 PROCEDURE — 36415 COLL VENOUS BLD VENIPUNCTURE: CPT

## 2021-02-20 PROCEDURE — 1180000000 HC REHAB R&B

## 2021-02-20 PROCEDURE — 87186 SC STD MICRODIL/AGAR DIL: CPT

## 2021-02-20 PROCEDURE — 97162 PT EVAL MOD COMPLEX 30 MIN: CPT

## 2021-02-20 PROCEDURE — 97116 GAIT TRAINING THERAPY: CPT

## 2021-02-20 PROCEDURE — 80048 BASIC METABOLIC PNL TOTAL CA: CPT

## 2021-02-20 PROCEDURE — 94760 N-INVAS EAR/PLS OXIMETRY 1: CPT

## 2021-02-20 PROCEDURE — 85027 COMPLETE CBC AUTOMATED: CPT

## 2021-02-20 PROCEDURE — 97166 OT EVAL MOD COMPLEX 45 MIN: CPT

## 2021-02-20 PROCEDURE — 87086 URINE CULTURE/COLONY COUNT: CPT

## 2021-02-20 PROCEDURE — 87077 CULTURE AEROBIC IDENTIFY: CPT

## 2021-02-20 PROCEDURE — 97535 SELF CARE MNGMENT TRAINING: CPT

## 2021-02-20 RX ADMIN — LEVOTHYROXINE SODIUM 88 MCG: 88 TABLET ORAL at 05:13

## 2021-02-20 RX ADMIN — RIVAROXABAN 15 MG: 15 TABLET, FILM COATED ORAL at 17:02

## 2021-02-20 RX ADMIN — DIGOXIN 125 MCG: 125 TABLET ORAL at 09:06

## 2021-02-20 RX ADMIN — CARVEDILOL 3.12 MG: 3.12 TABLET, FILM COATED ORAL at 09:06

## 2021-02-20 RX ADMIN — BUDESONIDE AND FORMOTEROL FUMARATE DIHYDRATE 2 PUFF: 160; 4.5 AEROSOL RESPIRATORY (INHALATION) at 16:54

## 2021-02-20 RX ADMIN — POTASSIUM CHLORIDE 20 MEQ: 20 TABLET, EXTENDED RELEASE ORAL at 09:06

## 2021-02-20 RX ADMIN — CITALOPRAM HYDROBROMIDE 20 MG: 20 TABLET ORAL at 09:06

## 2021-02-20 RX ADMIN — MIDODRINE HYDROCHLORIDE 5 MG: 5 TABLET ORAL at 09:06

## 2021-02-20 RX ADMIN — PANTOPRAZOLE SODIUM 40 MG: 40 TABLET, DELAYED RELEASE ORAL at 17:02

## 2021-02-20 RX ADMIN — MIDODRINE HYDROCHLORIDE 5 MG: 5 TABLET ORAL at 12:02

## 2021-02-20 RX ADMIN — Medication 10 ML: at 09:08

## 2021-02-20 RX ADMIN — PANTOPRAZOLE SODIUM 40 MG: 40 TABLET, DELAYED RELEASE ORAL at 06:04

## 2021-02-20 RX ADMIN — MIDODRINE HYDROCHLORIDE 5 MG: 5 TABLET ORAL at 17:02

## 2021-02-20 RX ADMIN — BUDESONIDE AND FORMOTEROL FUMARATE DIHYDRATE 2 PUFF: 160; 4.5 AEROSOL RESPIRATORY (INHALATION) at 06:12

## 2021-02-20 RX ADMIN — ASPIRIN 81 MG: 81 TABLET, CHEWABLE ORAL at 09:06

## 2021-02-20 RX ADMIN — FUROSEMIDE 20 MG: 20 TABLET ORAL at 09:06

## 2021-02-20 RX ADMIN — CYANOCOBALAMIN TAB 500 MCG 1000 MCG: 500 TAB at 09:06

## 2021-02-20 ASSESSMENT — PAIN SCALES - GENERAL
PAINLEVEL_OUTOF10: 0
PAINLEVEL_OUTOF10: 0

## 2021-02-20 NOTE — PROGRESS NOTES
Occupational Therapy  Facility/Department: [facility]  Daily Treatment Note  NAME: Waleska Chandler  : 1932  MRN: [id number]    Date of Service: 2021    Discharge Recommendations:  Continue to assess pending progress       Assessment      Activity Tolerance  Activity Tolerance: Patient Tolerated treatment well  Activity Tolerance: 3L O2  Safety Devices  Safety Devices in place: Yes  Type of devices: All fall risk precautions in place         Patient Diagnosis(es): There were no encounter diagnoses. has a past medical history of Ascending aortic aneurysm ([address] Charcot Arleen Tooth muscular atrophy, Depression, Descending aortic aneurysm ([address] Essential hypertension, Headache, HTN (hypertension), Impaired mobility and activities of daily living, Lumbar stenosis with neurogenic claudication, Myelopathy ([address] and Osteoarthritis. has a past surgical history that includes joint replacement and thoracentesis (Left, 2021). Restrictions  Restrictions/Precautions  Restrictions/Precautions: Fall Risk(high fall risk per clinical judgement- due to impulsive and low BP)     Subjective   General  Chart Reviewed: Yes  Patient assessed for rehabilitation services?: Yes  Family / Caregiver Present: No  Diagnosis: Impaired mobility and ADLs d/t CHF exacerbation    Subjective  Subjective: \"I'm okay. \"    Pain Assessment  Pain Level: 0  Pre Treatment Pain Screening  Pain at present: 0     Orientation  Orientation  Overall Orientation Status: Within Normal Limits     Objective      Patient engaged in B FM coordination/strengthening and cognition to increase I with fasteners and small objects for ADL's and IADL's. Patient unable to connect MAX resistive pop beads with MAX effort and multiple attempts. Patient able to disconnect pop beads with MAX difficulty.        Patient engaged in therapeutic activity to improve B UE ROM/strengthening, B FM coordination and cognition in order to increase [place] with ADL's and IADL's leisure activities. Patient with 0 difficulty reaching in lateral planes. Patient with 0 difficulty reaching in forward planes. Patient with MAX FM difficulty picking up pieces from container. Patient with MOD FM difficulty placing pieces. Parquetry: The first design the patient completed was a 19 piece symmetrical starburst design that patient completed under the original example picture. Example picture was placed at midline. Patient had MIN difficulty with activity and completed in a timely manner. Pieces were slightly ajar and 19 pieces correctly placed. The second design the patient completed was a 14 piece symmetrical potted flower design that patient completed under the original example picture. Example picture was placed at midline. Patient had MIN difficulty with activity and completed in a timely manner. Pieces were slightly ajar and 14 pieces correctly placed. The third design the patient completed was a 16 piece symmetrical butterfly design that patient completed under the original example picture. Example picture was placed at midline. Patient had MOD difficulty with activity and completed in a timely manner. Pieces were slightly ajar and 16/18 pieces correctly placed. The fourth design the patient completed was a 17 piece asymmetrical snail design that patient completed under the original example picture. Example picture placed at midline. Patient had MAX difficulty with activity and completed in a timely manner. Pieces were ajar and unable to detect which pieces were correctly placed. Plan   Plan  Times per week: 5-7x week  Plan weeks: 1-2  Current Treatment Recommendations: Strengthening, Safety Education & Training, Balance Training, Patient/Caregiver Education & Training, Self-Care / ADL, Functional Mobility Training, Neuromuscular Re-education, Endurance Training    Plan Comment: Continue POC         Goals  Patient Goals   Patient goals :  To go home       Therapy Time   Individual Concurrent Group Co-treatment   Time In 1430         Time Out 1510         Minutes 40             Therapeutic activities: 40 minutes     Electronically signed by Jaskaran Weston on 2/20/21 at 3:58 PM PRUDENCE Riojas

## 2021-02-20 NOTE — PROGRESS NOTES
Physical Therapy Rehab Treatment Note  Facility/Department: [facility]  Room: [facility]/[place] [address]       NAME: Radha Larsen  : 1932 (80 y.o.)  MRN: [id number]  CODE STATUS: Full Code    Date of Service: 2021  Chart Reviewed: Yes  Patient assessed for rehabilitation services?: Yes  Family / Caregiver Present: No  General Comment  Comments: pt speaks [place]    Restrictions:  Restrictions/Precautions: Fall Risk       SUBJECTIVE: Subjective: \"i'm good, we do exercise? \"  Pain Screening  Patient Currently in Pain: No  Pre Treatment Pain Screening  Pain at present: 0  Scale Used: Numeric Score  Intervention List: Patient able to continue with treatment    Post Treatment Pain Screening:  Pain Assessment  Pain Assessment: 0-10  Pain Level: 0    OBJECTIVE:   Follows Commands: Impaired         Transfers  Sit to Stand: Stand by assistance  Stand to sit: Stand by assistance  Bed to Chair: Stand by assistance    Ambulation  Ambulation?: Yes  Ambulation 1  Surface: carpet  Device: Rolling Walker  Other Apparatus: O2(3L)  Assistance: Stand by assistance;Contact guard assistance  Quality of Gait: FF posture, downward gaze, slow raji, no LOB, inconsistent BLE step length and height  Distance: 30', 80'  Comments: SP02 95% post    Stairs/Curb  Stairs?: Yes  Stairs  # Steps : 4  Stairs Height: 6\"  Rails: Bilateral  Device: No Device  Assistance: Contact guard assistance  Comment: non reciprocal pattern, SOB post stair training, SpO2 95%         Activity Tolerance  Activity Tolerance: Patient limited by fatigue    Exercises  Hamstring Sets: x20 YTB  Hip Flexion: seated 2 X 10  Hip Abduction: YTB x20  Knee Long Arc Quad: 2 X 10  Ankle Pumps: reports unable DT foot drop  Other exercises  Other exercises 4: hip ADD ball squeeze x10     ASSESSMENT/COMMENTS:  Pt demonstrated need for frequent rest breaks and VC for deep breathing interventions due to increased fatigue during functional mobility.       PLAN OF CARE/Safety:   Safety Devices  Type of devices:  All fall risk precautions in place      Therapy Time:   Individual   Time In 1100   Time Out 1130   Minutes 30     Minutes:30      Transfer/Bed mobility training:10      Gait training:15      Neuro re education:0     Therapeutic ex:5 Darroll Dance, PTA, 02/20/21 at 11:27 AM

## 2021-02-20 NOTE — FLOWSHEET NOTE
Patient arrived from [address] [place] [address] via wheelchair earlier. Patient alert and able to answer simple questions. Admission assessment completed. Changed telemetry box patient is SR 76. Patient states it is difficult for her to understand us and communicate with these masks on. Will complete as much of admission as possible tonight.    Electronically signed by Carolina Pollard RN on 2/19/2021 at 9:21 PM

## 2021-02-20 NOTE — PROGRESS NOTES
Occupational Therapy   Occupational Therapy Initial Assessment  Date: 2021   Patient Name: Susana Ddoson  MRN: [id number]     : 1932    Date of Service: 2021    Discharge Recommendations:  Continue to assess pending progress       Assessment   Performance deficits / Impairments: Decreased functional mobility ; Decreased safe awareness;Decreased balance;Decreased coordination;Decreased ADL status; Decreased posture;Decreased ROM; Decreased endurance;Decreased strength;Decreased fine motor control  Assessment: Pt is 81 y/o female presenting with the above deficits, resulting in increased dependence for safe competion of self care ADLs. Pt limited by weakness/decreased endurance and poor  strength for fine motor areas of care. Pt would benefit from OT to improve on condition to maximize safety and independence with functional transfers, mobility an self care. Prognosis: Good  Decision Making: Medium Complexity  History: Pt's medical history is moderately complex  Exam: 10  Assistance / Modification: Pt. requires mod A  OT Education: OT Role;Plan of Care  REQUIRES OT FOLLOW UP: Yes  Activity Tolerance  Activity Tolerance: Patient Tolerated treatment well  Safety Devices  Safety Devices in place: Yes  Type of devices: All fall risk precautions in place  Restraints  Initially in place: No           Patient Diagnosis(es): There were no encounter diagnoses. has a past medical history of Ascending aortic aneurysm ([address] Charcot Arleen Tooth muscular atrophy, Depression, Descending aortic aneurysm ([address] Essential hypertension, Headache, HTN (hypertension), Impaired mobility and activities of daily living, Lumbar stenosis with neurogenic claudication, Myelopathy ([address] and Osteoarthritis. has a past surgical history that includes joint replacement and thoracentesis (Left, 2021).            Restrictions  Restrictions/Precautions  Restrictions/Precautions: Fall Risk(high fall risk per clinical judgement- due to impulsive and low BP)    Subjective   General  Chart Reviewed: Yes  Patient assessed for rehabilitation services?: Yes  Family / Caregiver Present: No  Diagnosis: Impaired mobility and ADLs d/t CHF exacerbation  Subjective  Subjective: I want to get up  Patient Currently in Pain: Denies  Pain Assessment  Pain Assessment: 0-10  Pain Level: 0  Pre Treatment Pain Screening  Pain at present: 0  Intervention List: Patient able to continue with treatment  Vital Signs  Temp: 98.2 °F (36.8 °C)  Temp Source: Oral  Pulse: 69  BP: (!) 147/90  BP Location: Left upper arm  Patient Currently in Pain: Denies  Oxygen Therapy  SpO2: 95 %  O2 Device: Nasal cannula  O2 Flow Rate (L/min): 3 L/min  Social/Functional History  Social/Functional History  Lives With: Alone  Type of Home: House  Home Layout: One level  Home Access: Stairs to enter with rails  Entrance Stairs - Rails: Both  Bathroom Shower/Tub: Tub/Shower unit  Bathroom Equipment: Grab bars in shower, Shower chair  Home Equipment: Rolling walker, Cane  ADL Assistance: Independent  Homemaking Assistance: Independent  Homemaking Responsibilities: Yes  Ambulation Assistance: Independent  Transfer Assistance: Independent  Active : No  Additional Comments: Son stops over 2 times daily       Objective   Vision: Within Functional Limits  Hearing: Exceptions to [facility]  Hearing Exceptions: Hard of hearing/hearing concerns; No hearing aid    Orientation  Overall Orientation Status: Within Normal Limits  Observation/Palpation  Posture: Fair  Observation: O2 NC, taylor  Balance  Sitting Balance: Supervision  Standing Balance: Contact guard assistance  Functional Mobility  Functional - Mobility Device: Rolling Walker  Assist Level:  Moderate assistance  Functional Mobility Comments: Verbal cues required for safety awareness  Toilet Transfers  Toilet - Technique: Stand pivot  Equipment Used: Grab bars  Toilet Transfer: Unable to assess  Toilet Transfers Comments: pt did not need to go  Tub Transfers  Tub Transfers: Not tested  Shower Transfers  Shower - Transfer From: [place] - Transfer Type: To and From  Shower - Transfer To: Shower seat with back  Shower - Technique: Ambulating  Shower Transfers: Not tested  Shower Transfers Comments: Pt completed sponge bath ADL  Wheelchair Bed Transfers  Equipment Used: Bed;Wheelchair  Level of Asssistance: Minimal assistance  ADL  Feeding: Minimal assistance(unable to open packages or containers, unabe to cut food.)  Grooming: Setup  UE Bathing: Supervision  LE Bathing: Moderate assistance(Mod A secondary to balance, standing EOB for bathing)  UE Dressing: Supervision  Toileting: Moderate assistance(mod A secondary to balance and safety, standing EOB with w/w)  Additional Comments: pt completed sponge bath ADL EOB  Tone RUE  RUE Tone: Normotonic  Tone LUE  LUE Tone: Normotonic  Coordination  Movements Are Fluid And Coordinated: No  Coordination and Movement description: Fine motor impairments  Quality of Movement Other  Comment: poor pincer abilibies for fine motor coordination     Bed mobility  Supine to Sit: Supervision  Sit to Supine: Supervision  Scooting: Minimal assistance  Transfers  Sit to stand: Minimal assistance  Stand to sit: Minimal assistance  Transfer Comments: Verbal cues for safe technique  Vision - Basic Assessment  Prior Vision: No visual deficits  Visual History: No significant visual history  Patient Visual Report: No visual complaint reported. Visual Field Cut: No  Oculo Motor Control: WNL  Cognition  Overall Cognitive Status: WFL  Arousal/Alertness: Appropriate responses to stimuli  Following Commands:  Follows all commands without difficulty  Attention Span: Appears intact  Safety Judgement: Good awareness of safety precautions  Problem Solving: Assistance required to identify errors made  Insights: Decreased awareness of deficits  Initiation: Requires cues for some  Sequencing: Requires cues for some  Cognition Comment: Some limitations d/t language barrier but appears WFL  Perception  Overall Perceptual Status: WFL     Sensation  Overall Sensation Status: WFL        LUE AROM (degrees)  LUE AROM : WFL  Left Hand AROM (degrees)  Left Hand AROM: WFL  RUE AROM (degrees)  RUE AROM : WFL  Right Hand AROM (degrees)  Right Hand AROM: WFL  LUE Strength  L Hand General: 3/5  RUE Strength  R Hand General: 3/5     Hand Dominance  Hand Dominance: Right             Plan   Plan  Times per week: 5-7x week  Plan weeks: 1-2  Current Treatment Recommendations: Strengthening, Safety Education & Training, Balance Training, Patient/Caregiver Education & Training, Self-Care / ADL, Functional Mobility Training, Neuromuscular Re-education, Endurance Training  Plan Comment: Continue POC    G-Code     OutComes Score                                                  AM-PAC Score             Goals  [x]  Patient will complete self care as followed using the recommended adaptive equipment and/or adaptive techniques as instructed:  Feeding:set up  Grooming: set up  Bathing: min A  UE Dressing: set up  LE Dressing: supervision  Toileting: supervision  Toilet Transfers: supervision  Tub Transfers: supervision             [x]  Patient will improve static and dynamic standing balance to complete pants management at supervision level     [x]  Patient will improve B UE Function (AROM, strength, motor control, tone normalization) to complete ADLs as projected. [x]  Patient will improve functional endurance to tolerate/complete 20 minutes of ADLs. [x]  Patient will improve B UE strength and endurance to 3+/5 in order to participate in self-care activities as projected. [x]  Patient will improve B hand fine motor coordination to  in order to manage clothing fasteners/self-care containers in a timely manner     []     []     [x]  Patient will perform basic room mobility at supervision level.       [x]  Patient will access appropriate D/C site with as few architectural barriers as possible.     []     [x]  Patient and/or caregiver will demonstrate understanding of energy conservation techniques with min A verbal/tactile cues. []       Patient Goals   Patient goals :  To go home       Therapy Time   Individual Concurrent Group Co-treatment   Time In 0800         Time Out 0900         Minutes [facility],  Electronically signed by Elma Castle OT on 2/20/2021 at 10:55 AM

## 2021-02-20 NOTE — CONSULTS
Hospitalist Progress Note  2/20/2021 11:57 AM    Assessment and Plan:   1. Generalized weakness, Gait instability and Decreased Functional Status secondary to recurrent pleural effuson: Underwent diagnostic and therapeutic thoracentesis on 2/16/2021 720ml removed. Fluids shows lymphocyte predominant pleural effusion. Thoracoscopic biopsy not recommended given age and poor health. Pleurx cath will be placed on 2/23/2021 by thoracic surgery. Continue supplemental oxygen to maintain sats 90-92%. IS and acapella. Fall precautions. PT OT to evaluate. Maximize nutrition status. Assessing if needs DME at home. SW on board. 2. PAF/HTN/CHF (combined): Now in NSR. Cardiology managing. EF 40%. Previous cath. On xarelto, coreg, digoxin, lasix  3. Hypothyroidism: Continue synthroid  4. Urinary retention: taylor catheter in place. Monitor intake and output  5. GERD: Protonix daily  6. Hypotension: Improved with midodrine. 7. HX provoked DVT: On xarelto  8. HX SVT: s/p ablation  9. Bowel Regimen and GI PPx: stool softners PRN ordered with hold parameters for loose stools or diarrhea. On antiacid  10. Diet: DIET LOW SODIUM 2 GM;  11. Advance Directive: Full Code   12. Nutrition status:Supplemental Vitamins ordered. Dietitian assessment  13. Vaccinations: Immunization records reviewed. If has not received appropriate vaccinations, will order to be given prior to discharge. 14. DVT prophylaxis: On xarelto  15. Discharge planning: SW on board. Will discharge once medically stable and cleared by all consultants. 16. High Risk Readmission Screening Tool Score Noted. Additionally, the following hospital problems were addressed: Active Problems:    Impaired mobility  Resolved Problems:    * No resolved hospital problems. *      ** Total time spent reviewing medical records, evaluating patient, speaking with RN's and consultants where I was focused exclusively on this patient: 82 minutes.    This time is excluding time spent performing procedures or significant events occurring earlier or later in the day requiring my attention and focus. Subjective:   Admit Date: 2/19/2021  PCP: Santosh Arreola MD    No acute events overnight. Afebrile Telemetry reviewed. No new complaints. Pt denies chest pain, SOB, N/V, fevers or chills. Objective:     Vitals:    02/19/21 2017 02/19/21 2115 02/20/21 0612 02/20/21 0900   BP: (!) 156/87   (!) 147/90   Pulse: 68   69   Resp:       Temp:    98.2 °F (36.8 °C)   TempSrc:    Oral   SpO2:   98% 95%   Weight:  157 lb (71.2 kg)     Height:  5' 3\" (1.6 m)       General appearance: No acute distress, A+ O x 1-2. No  conversational dyspnea noted. Dentition intact. Answers questions appropriately  Lungs:  Diminished,  no exp wheezes, No rales No retractions; No use of accessory muscles  Heart:  S1, S2 normal, RRR, no MRG appreciated  Abdomen: (+) BS, soft, non-tender; non distended no guarding or rigidity. Extremities:  no cyanosis, trace edema bilat lower exts, no calf tenderness bilaterally.  Dry skin noted       Medications:      sodium chloride flush  10 mL Intravenous 2 times per day    aspirin  81 mg Oral Daily    budesonide-formoterol  2 puff Inhalation BID    carvedilol  3.125 mg Oral BID    citalopram  20 mg Oral Daily    digoxin  125 mcg Oral Daily    furosemide  20 mg Oral Daily    levothyroxine  88 mcg Oral Daily    midodrine  5 mg Oral TID WC    pantoprazole  40 mg Oral BID AC    potassium chloride  20 mEq Oral Daily with breakfast    rivaroxaban  15 mg Oral Daily    vitamin B-12  1,000 mcg Oral Daily       LABS Reviewed    IMAGING Reviewed    Jeannie Hodge CNP  [facility] Hospitalist

## 2021-02-20 NOTE — PROGRESS NOTES
01/29/2021    total of 755 cc removed per Dr Daniela Gordon specimen sent to lab     Chart Reviewed: Yes  Patient assessed for rehabilitation services?: Yes  Family / Caregiver Present: No  Diagnosis: Impaired mobility & ADL's d/t progressive toxic encephalopathy 2* CHF & COPD exacerbation 2/19/2021  General Comment  Comments: pt speaks [place]    Restrictions:  Restrictions/Precautions: Fall Risk(high fall risk per clinical judgement- due to impulsive and low BP)     SUBJECTIVE: Subjective: \"i'm good, we do exercise? \"  Response To Previous Treatment: Patient with no complaints from previous session. Pre Treatment Pain Screening  Pain at present: 0  Scale Used: Numeric Score  Intervention List: Patient able to continue with treatment    Post Treatment Pain Screening:  Pain Assessment  Pain Assessment: 0-10  Pain Level: 0    Prior Level of Function:  Social/Functional History  Lives With: Alone  Type of Home: House  Home Layout: One level  Home Access: Stairs to enter with rails  Entrance Stairs - Rails: Both  Bathroom Shower/Tub: Tub/Shower unit  Bathroom Equipment: Grab bars in shower, Shower chair  Home Equipment: Rolling walker, Cane  ADL Assistance: Independent  Homemaking Assistance: Independent  Homemaking Responsibilities: Yes  Ambulation Assistance: Independent  Transfer Assistance: Independent  Active : No  Additional Comments: Son stops over 2 times daily    OBJECTIVE:   Vision/Hearing:  Vision: Within Functional Limits  Hearing: Exceptions to [facility]  Hearing Exceptions: Hard of hearing/hearing concerns; No hearing aid    Cognition:  Overall Orientation Status: Within Functional Limits  Follows Commands: Impaired  Observation/Palpation  Posture: Fair(forward head, rounded shoulder, protracted scapula, increased T kyphosis, flexed foward posture)  Observation: Pt resting in bed upon arrival, on 3L O2 NC, SOB upon exertion, taylor catheter    ROM:  RLE PROM: WFL  LLE PROM: WFL    Strength:  Strength RLE  Comment: 4-/5 except 2/5 df  Strength LLE  Comment: 4-/5 except 2/5 df    Neuro:  Sensation  Overall Sensation Status: WFL     Balance  Posture: Fair  Sitting - Static: Good  Sitting - Dynamic: Good  Standing - Static: Fair;+  Standing - Dynamic: Fair  Tone RLE  RLE Tone: Normotonic  Tone LLE  LLE Tone: Normotonic    Bed mobility  Rolling to Left: Stand by assistance  Rolling to Right: Stand by assistance  Supine to Sit: Stand by assistance  Sit to Supine: Stand by assistance  Comment: HOB flat, no use of bed rails    Transfers  Sit to Stand: Stand by assistance  Stand to sit: Stand by assistance  Bed to Chair: Stand by assistance  Stand Pivot Transfers: Stand by assistance  Comment: without 2ww    Ambulation  Ambulation?: Yes  Ambulation 1  Surface: level tile  Device: Rolling Walker  Other Apparatus: O2(3L O2)  Assistance: Contact guard assistance  Quality of Gait: flexed posture, downward gaze, SOB upon exertion  Gait Deviations: Slow Lea; Increased JAMES  Distance: 50ft with turns x 2 reps  Comments: SP02 95% post ambulation     Stairs to be assessed -limited by time constraint on eval     Activity Tolerance  Activity Tolerance: Patient limited by fatigue   Bernard Balance Scale  1. Sitting to Standing Able to stand independently using hands   2. Standing Unsupported Able to stand 30 seconds unsupported   3. Sitting with Back Unsupported but Feet Supported on Floor or on a Stool Able to sit safely and securely for 2 minutes   4. Standing to Sitting Controls descent by using hands   5. Transfers Able to transfer safely definite need of hands   6. Standing Unsupported with Eyes Closed Able to stand 10 seconds with supervision   7. Standing Unsupported with Feet Together Needs help to attain position but able to stand 15 seconds feet together   8. Reach Forward with Outstretched Arm While Standing Reaches forward but needs supervision   9.   Object from Floor from a Standing Position Unable to try/needs assist to keep from losing balance or falling   10. Turning to Look Behind Over Left and Right Shoulders While Standing Needs assist to keep from losing balance or falling   11. Turn 360 Degrees Needs assistance while turning   12. Place Alternate Foot on Step or Stool While Standing Unsupported Needs assistance to keep from falling/unable to try   13. Standing Unsupported One Foot in [employer] balance while stepping or standing   14. Standing on One Leg Unable to try needs assist to prevent fall   Bernard Balance Score 20       Quality Indicators (IRF-CHAI):  Rolling L and R: Supervision or Touching Assistance - 4  Sit>Supine: Supervision or Touching Assistance - 4  Supine>Sit: Supervision or Touching Assistance - 4  Sit>Stand: Supervision or Touching Assistance - 4  Chair/Bed>Chair Transfer: Supervision or Touching Assistance - 4  Car Transfers: Not attempted due to Environmental Limitations (i.e. weather, equipment unavailable) - 10  Walk 10 ft: Not attempted due to Medical Condition or Safety Concerns (I.e. unsafe or physician orders) - 80  Walk 50 ft with two 90 degree turns: Supervision or Touching Assistance - 4  Walk 150 ft in [address]: Not attempted due to Medical Condition or Safety Concerns (I.e. unsafe or physician orders) - 88  Walking 10 ft on Unlevel Surface: Not attempted due to Environmental Limitations (i.e. weather, equipment unavailable) - 10  Picking up Objects from Standing Position: Not attempted due to Environmental Limitations (i.e. weather, equipment unavailable) - 10  Stairs: No Not attempted due to environmental limitations (i.e. equipment, time) - 10  WC Mobility: No Not Applicable (pt did not complete item prior to admission) - 9      ASSESSMENT:  Body structures, Functions, Activity limitations: Decreased functional mobility ; Decreased safe awareness;Decreased balance;Decreased ADL status; Decreased endurance;Decreased strength;Decreased posture  Decision Making: Medium Complexity  History: high  Exam: high  Clinical Presentation: med    Prognosis: Good  PT Education: PT Role;Plan of Care;Goals; General Safety  Barriers to Learning: language barrier    CLINICAL IMPRESSION: Pt demonstrates the above deficits and decline in functional mobility status placing them at increased risk for falls. Pt would benefit from physical therapy to address above deficits and allow for safe return home at highest level of function, decrease risk for falls, and improve QOL. PLAN OF CARE:  Frequency: 1-2 treatment sessions per day, 5-7 days per week  Plan Comment: Cont.  POC  Current Treatment Recommendations: Strengthening, Transfer Training, Endurance Training, Neuromuscular Re-education, Patient/Caregiver Education & Training, ROM, Wheelchair Mobility Training, Manual Therapy - Soft Tissue Mobilization, Pain Management, Equipment Evaluation, Education, & procurement, Balance Training, Gait Training, Home Exercise Program, Functional Mobility Training, Stair training, Safety Education & Training, Positioning, Modalities    Patient's Goal:  \"go home\"    GOALS:  Long term goals  Long term goal 1: Pt will perform bed mobility with indep  Long term goal 2: Pt will perform functional transfers with indep  Long term goal 3: Pt will ambulate >/=50ft with 2ww and mod I  Long term goal 4: Pt will negotiate 4 steps with handrail and SBA  Long term goal 5: Pt will perform >/= 23/56 for Bernard balance    ELOS:   Plan weeks: 1- 1 1/2    Therapy Time:    Individual   Time In 1030   Time Out 1100   Minutes 1 [place], [place], 02/20/21 at 2:50 PM

## 2021-02-20 NOTE — PLAN OF CARE
Problem: Falls - Risk of:  Goal: Will remain free from falls  Description: Will remain free from falls  2/20/2021 1030 by Anupam Hall RN  Outcome: Ongoing  2/20/2021 0327 by Mario Ballesteros RN  Outcome: Ongoing  2/20/2021 0326 by Mario Ballesteros RN  Outcome: Ongoing  Goal: Absence of physical injury  Description: Absence of physical injury  2/20/2021 1030 by Anupam Hall RN  Outcome: Ongoing  2/20/2021 0327 by Mario Ballesteros RN  Outcome: Ongoing  2/20/2021 0326 by Mario Ballesteros RN  Outcome: Ongoing     Problem: Mobility - Impaired:  Goal: Mobility will improve  Description: Mobility will improve  2/20/2021 1030 by Anupam Hall RN  Outcome: Ongoing  2/20/2021 0327 by Mario Ballesteros RN  Outcome: Ongoing  2/20/2021 0326 by Mario Ballesteros RN  Outcome: Ongoing     Problem: Skin Integrity:  Goal: Will show no infection signs and symptoms  Description: Will show no infection signs and symptoms  Outcome: Ongoing  Goal: Absence of new skin breakdown  Description: Absence of new skin breakdown  Outcome: Ongoing

## 2021-02-20 NOTE — H&P
HISTORY & PHYSICAL       DATE OF ADMISSION:  2/19/2021    DATE OF SERVICE:  2/20/21    Subjective:    Carmella Paez, 80 y.o. female presents today with:     CHIEF COMPLAINT:  Weakness and SOB     HPI     Assessment: Pt is an 81 y/o female from home alone who presents to [facility] with the above deficits which impact her independence and safety during ADLs. Pt limited d/t fatigue, weakness and decreased endurance. Pt would benefit from OT  to maximize independence and safety during ADLs. The patient has stabilized medically andis able to participate at acute level rehab but is too medically complex for SNF due to need for therapy at the acute level with at least 15 hours a week of PT OT and cognitive and recreational therapy at an acute level with daily medical monitoring. Imaging:    Imaging and other studies reviewed and discussed with patient and staff    Echo Complete 2d W Doppler W Color    Result Date: 1/27/2021  Transthoracic Echocardiography Report (TTE)  Demographics  Patient Name   Sandra Fermin        Gender              Female                 Angy Bacon  Patient Number [id number]          Race                                                   Ethnicity  Visit Number   [id number]         Room Number         F629  Corporate ID                     Date of Study       01/27/2021  Accession      [id number]        Referring Physician  Number  Date of Birth  07/12/1932        Sonographer         Chris Ureña [facility]  Age            80 year(s)        Interpreting        [facility])                                   Physician           Cardiology                                                       Natalie Reese MD Procedure Type of Study  TTE procedure:ECHO COMPLETE 2D W/DOP W/COLOR. Procedure Date Date: 01/27/2021 Start: 10:26 AM Study Location: Portable Technical Quality: Adequate visualization Indications:Congestive heart failure.  Patient Status: Routine Height: 64 inches Weight: 165 pounds BSA: 1.8 m^2 BMI: 28.32 kg/m^2 BP: 108/77 mmHg  Conclusions  Summary  Mitral annular calcification is present. 1+ MR  Left ventricular ejection fraction is visually estimated at 40%. E/A flow reversal noted. Suggestive of diastolic dysfunction. Signature  ----------------------------------------------------------------  Electronically signed by Anibal Rivas MD(Interpreting  physician) on 01/27/2021 11:37 AM  ----------------------------------------------------------------  Findings Left Ventricle Left ventricular ejection fraction is visually estimated at 40%. E/A flow reversal noted. Suggestive of diastolic dysfunction. Right Ventricle Normal right ventricle structure and function. Normal right ventricle systolic pressure. Left Atrium Moderately dilated left atrium. Right Atrium Moderately enlarged right atrium size. Mitral Valve Mitral annular calcification is present. 1+ MR Tricuspid Valve Normal tricuspid valve structure and function. 1-2+ TR RVSP 38 mmHg Aortic Valve Aortic valve leaflets are moderately thickened. No AS No AR Pulmonic Valve The pulmonic valve was not well visualized . Pericardial Effusion No evidence of pericardial effusion. Pleural Effusion No evidence of pleural effusion. Aorta \ Miscellaneous The aorta is within normal limits. M-Mode Measurements (cm)  LVIDd: 4.39 cm                         LVIDs: 3.55 cm  IVSd: 1.43 cm                          IVSs: 1.55 cm  LVPWd: 1.5 cm                          LVPWs: 1.48 cm  Rt. Vent.  Dimension: 3.74 cm           AO Root Dimension: 3.62 cm                                         ACS: 1.25 cm                                         LA: 4.54 cm                                         LVOT: 2.04 cm Doppler Measurements:  AV Velocity:0.02 m/s                    MV Peak E-Wave: 0.85 m/s  AV Peak Gradient: 10.44 mmHg            MV Peak A-Wave: 0.87 m/s  AV Mean Gradient: 6.01 mmHg  AV Area (Continuity):1.83 cm^2  TR Velocity:2.62 m/s                    Estimated RAP:10 mmHg  TR Gradient:27.54 mmHg                  RVSP:37.54 mmHg Valves  Mitral Valve  Peak E-Wave: 0.85 m/s                 Peak A-Wave: 0.87 m/s                                        E/A Ratio: 0.97                                        Peak Gradient: 2.88 mmHg                                        Deceleration Time: 232.8 msec  Tissue Doppler  E' Septal Velocity: 0.07 m/s  E' Lateral Velocity: 0.08 m/s  Aortic Valve  Peak Velocity: 1.62 m/s                Mean Velocity: 1.15 m/s  Peak Gradient: 10.44 mmHg              Mean Gradient: 6.01 mmHg  Area (continuity): 1.83 cm^2           Area (2D): 1.8 cm^2  AV VTI: 28.8 cm  Cusp Separation: 1.25 cm  Tricuspid Valve  Estimated RVSP: 37.54 mmHg              Estimated RAP: 10 mmHg  TR Velocity: 2.62 m/s                   TR Gradient: 27.54 mmHg  Pulmonic Valve  Peak Velocity: 0.96 m/s           Peak Gradient: 3.66 mmHg                                    Estimated PASP: 37.54 mmHg  LVOT  Peak Velocity: 0.89 m/s              Mean Velocity: 0.57 m/s  Peak Gradient: 3.16 mmHg             Mean Gradient: 1.58 mmHg  LVOT Diameter: 2.04 cm               LVOT VTI: 16.15 cm Structures  Left Atrium  LA Dimension: 4.54 cm                        LA Area: 11.81 cm^2  LA/Aorta: 1.25  LA Volume/Index: 44.64 ml /25 m^2  Left Ventricle  Diastolic Dimension: 5.15 cm         Systolic Dimension: 9.20 cm  Septum Diastolic: 8.47 cm            Septum Systolic: 2.19 cm  PW Diastolic: 1.5 cm                 PW Systolic: 4.23 cm                                       FS: 19.1 %  LV EDV/LV EDV Index: 87.32 ml/49 m^2 LV ESV/LV ESV Index: 52.68 ml/29 m^2  EF Calculated: 39.7 %                LV Length: 6.12 cm  LVOT Diameter: 2.04 cm  Right Atrium  RA Systolic Pressure: 10 mmHg  Right Ventricle  Diastolic Dimension: 7.06 cm                                    RV Systolic Pressure: 83.28 mmHg Aorta/ Miscellaneous Aorta  Aortic Root: 3.62 cm  LVOT Diameter: 2.04 cm    Xr Chest (2 Vw)    1.   No evidence of pneumothorax. Resolution of left pleural effusion. Cardiac silhouette remains enlarged. Left lower lobe hazy opacity may represent atelectasis versus pneumonia or infiltrate. COMPARISON: No prior studies available for comparison. DIAGNOSIS:  Post left thoracentesis. Dr. Jenene Councilman to read. COMMENTS: I50.43 Acute on chronic combined systolic and diastolic CHF (congestive heart failure) (HCC) ICD10 TECHNIQUE: XR CHEST (2 VW) FINDINGS: Left lower lobe opacity may represent atelectasis versus pneumonia or infiltrate. Interval resolution of left pleural effusion. No evidence of pneumothorax. Cardiac silhouette is enlarged. Visualized soft tissues, and osseous structures are unremarkable. Ct Chest W Contrast    Result Date: 2/17/2021  CT of the Chest with intravenous contrast medium. HISTORY:  Recurrent pleural effusion  TECHNICAL FACTORS: CT imaging of the chest was obtained and formatted as 5 mm contiguous axial images from the thoracic inlet through the adrenal glands. Sagittal and coronal reconstruction obtained during postprocessing. Intravenous contrast medium:  100 ml of Isovue-300 Comparison:  CTA chest, January 25, 2021, March 15, 2019, July 20, 2007. Chest radiograph, February 16, 2021, February 13, 2021, February 10, 2021. Findings: Right lung: Emphysematous change. No nodules, masses, consolidation, pleural effusion, pneumothorax. Left lung: Emphysematous change. No nodules or masses. Large effusion tracking laterally left chest, and coursing into major fissure, compressing portion left upper lobe. Pleural effusion also identified, left chest space width atelectasis, lingula and left lower lobe. Lymph nodes: No hilar, mediastinal, or axillary lymph node enlargement. Thoracic aorta: AP and transverse dimension, ascending thoracic aorta, at level of pulmonary chill bifurcation, measures 4.4 x 4.5 cm. Descending thoracic aorta tortuous, with AP dimension, 3.8 cm. Cardiac Size: Enlarged.  Pericardial effusion: None. Upper abdomen:Limited imaging upper abdomen shows no gross anomaly. Musculoskeletal:No osteoblastic, and no osteolytic lesions. Diffuse disc space narrowing with anterior osteophytes thoracic spine. Moderate left pleural effusion resulting in compressive atelectatic change lingula, left upper, and left lower lobe. Ectasia, ascending and descending thoracic aorta. Cardiomegaly. All CT scans at this facility use dose modulation, iterative reconstruction, and/or weight based dosing when appropriate to reduce radiation dose to as low as reasonably achievable. Cta Chest W Wo Contrast    Result Date: 1/26/2021  EXAMINATION:  CHEST CTA WITH CONTRAST (PULMONARY EMBOLISM PROTOCOL) CLINICAL HISTORY:   PE   I50.43 Acute on chronic combined systolic and diastolic CHF (congestive heart failure) ([facility] ICD10. Technique:  Spiral axial CT acquisition of the chest from the thoracic inlet to the upper abdomen following IV contrast.  2-D images were reconstructed in the sagittal and coronal planes. 3-D MIPS images were generated in the coronal and axial planes. Images were reviewed on the PACS workstation. All images including the 3-D MIPS were personally archived. Contrast:  IV administration of 100 ml Isovue 300 All CT scans at this facility use dose modulation, iterative reconstruction, and/or weight based dosing when appropriate to reduce radiation dose to as low as reasonably achievable. Comparison:  CTA chest 3/15/2019  RESULT: Limitations: Motion artifact. Evaluation for thromboembolic disease:      - Right heart chambers:  No thromboembolic disease.      - Main pulmonary arteries:  No thromboembolic disease.      - Lobar pulmonary arteries:  No thromboembolic disease.        - Segmental pulmonary arteries:  No thromboembolic disease.        - Subsegmental pulmonary arteries:  Not well visualized due to motion. Lines, tubes, and devices:  None. Lung parenchyma and pleura:   Tortuous course of the appropriate to reduce radiation dose to as low as reasonably achievable. FINDINGS   Liver: Small hypodensities in the right hepatic lobe the largest measuring 8 mm consistent with small cysts. Spleen: Negative    Pancreas: Negative     Kidney: Negative   Adrenal glands are negative. Bowel: Negative    Nodes: No lymphadenopathy. Aorta: Dilated descending thoracic aorta maximum diameter approximately 4 cm adjacent appearance from the CT scans from January 25, 2021. No infrarenal abdominal aortic aneurysm. Peritoneum: No free fluid or free air. The abdominal wall is intact. Pelvis : 9.4 x 6.5 x 6.6 cm cystic mass in the left adnexa. This appears to be a chronic finding this patient was present on an MRI scan that included this area back in April 2016. Bladder catheter in place with decompressed bladder. Bones: No acute osseous abnormalities. Lung bases: Persistent bibasilar effusions not atelectasis left greater than right. PERSISTENT BIBASILAR PLEURAL-PARENCHYMAL CHANGES. NO ACUTE PATHOLOGY IN THE ABDOMEN OR PELVIS. Xr Chest Portable    1. No evidence of pneumothorax. Near complete resolution of the left pleural effusion. Persistent left lower lobe opacification, may represent pneumonia, though underlying nodule or mass cannot be excluded. There is vascular congestion consistent with mild pulmonary edema. COMPARISON: February 13, 2021 DIAGNOSIS:  post left thoracentesis- show Dr Court Conde: I50.43 Acute on chronic combined systolic (congestive) and diastolic (congestive) heart failure ([address] ICD10 TECHNIQUE: XR CHEST PORTABLE FINDINGS: Left lower lobe opacity may represent pneumonia though underlying nodule or mass cannot be excluded. No evidence of pneumothorax. Near complete resolution of the left pleural effusion. Increase in vascular congestion, consistent with pulmonary edema. Cardiac silhouette remains enlarged.   Visualized soft tissues, and osseous structures are unremarkable. Xr Chest Portable    Result Date: 2/13/2021  EXAMINATION: XR CHEST PORTABLE. DATE AND TIME:2/13/2021 3:16 AM CLINICAL HISTORY: Shortness of breath   hypoxia  COMPARISONS: February 10, 2021  FINDINGS: Persistent extensive opacity throughout the left hemithorax consistent with effusion and consolidation. Minimal pleural parenchymal changes at the right base. Overall findings are unchanged     No significant change     Xr Chest Portable    Result Date: 2/10/2021  EXAMINATION: CHEST PORTABLE VIEW  CLINICAL HISTORY: Hypoxia COMPARISONS: February 8, 2021 0831 hours  FINDINGS: Single  views of the chest is submitted. The cardiac silhouette is enlarged Pulmonary vascular remains mildly congested. Right sided trachea. Patchy to coalescent infiltrate within the right lower lobe as well as an area of infiltrate/consolidation left lower lobe with trace right pleural effusion and left pleural effusion. .  No Pneumothoraces. PULMONARY VASCULAR REMAINS MILDLY CONGESTED. COULD SUGGEST COMPONENT OF UNDERLYING CHF. CORRELATE CLINICALLY PATCHY TO COALESCENT INFILTRATE WITHIN THE RIGHT LOWER LOBE AS WELL AS AN AREA OF INFILTRATE/CONSOLIDATION LEFT LOWER LOBE WITH TRACE RIGHT PLEURAL EFFUSION AND LEFT PLEURAL EFFUSION. Sailaja Weir Chest Portable    Result Date: 2/8/2021  Exam: XR CHEST PORTABLE History: Shortness of breath. Chest pain. Technique: AP portable view of the chest obtained. Comparison: Portable chest radiograph from February 7, 2021 Findings: Heart size is enlarged. Small bilateral pleural effusions. Bibasilar opacities, left greater than right. No pneumothorax. No acute osseous abnormality. Small bilateral pleural effusions, left greater than right. Bibasilar atelectasis and/or infiltrate, left greater than right.      Xr Chest Portable    Result Date: 2/8/2021  EXAMINATION: XR CHEST PORTABLE REASON FOR EXAM: chest pain FINDINGS: Frontal view of the chest reveals cardiomegaly and prominent central pulmonary vasculature consistent with chronic congestion. Findings similar and unchanged from 2/3/2021. Opacification at the left base consistent with residual pneumonitis/atelectasis remains unchanged from previous study 4 days earlier. PNEUMONIA/ATELECTASIS LEFT BASE. CARDIOMEGALY. MILD CHRONIC CENTRAL VASCULAR CONGESTION. NO SIGNIFICANT CHANGE FROM 2/3/2020     Xr Chest Portable    Result Date: 2/4/2021  EXAMINATION: XR CHEST PORTABLE CLINICAL HISTORY: SHORTNESS OF BREATH, CHEST PAIN COMPARISONS: CTA CHEST, JANUARY 25, 2021. CHEST RADIOGRAPHS JANUARY 28, JANUARY 29, 2021. FINDINGS: Osseous structures intact. Cardiopericardial silhouette enlarged and unchanged. Cephalization pulmonary vasculature. No focal airspace disease. Increased opacity left lower lung obscures left diaphragm. STABLE MARKED CARDIOMEGALY. PROBABLE INTERSTITIAL EDEMA. LEFT LOWER LUNG ATELECTASIS/PNEUMONIA. Xr Chest Portable    Result Date: 1/28/2021  EXAMINATION: CHEST PORTABLE VIEW  CLINICAL HISTORY: Short of breath COMPARISONS: None  FINDINGS: Single  views of the chest is submitted. The cardiac silhouette is enlarged. Pulmonary vascular unremarkable. Right sided trachea. Left lower lobe infiltrate/consolidation, collapse with left sided pleural effusion. LEFT LOWER LOBE INFILTRATE/CONSOLIDATION, COLLAPSE WITH LEFT SIDED PLEURAL EFFUSION    Xr Chest Portable    Result Date: 1/26/2021  EXAMINATION: Portable AP ERECT view of the chest. CLINICAL HISTORY:  SOB , Negative Covid-19 test. DATE: 1/25/2021 5:41 PM COMPARISONS: 7/24/2017 FINDINGS: The heart is moderately enlarged, similar to prior exam.  Prominent interstitial markings, consistent with increasing Central vascular congestion.  The costophrenic angles are blunted secondary to pleural effusions bilaterally, left greater than  right. No pneumothorax. There are infiltrates/atelectasis within lung bases, left greater than right. There are moderate degenerative changes in spine. CARDIAC ENLARGEMENT WITH CENTRAL VESSEL CONGESTION AND BILATERAL PLEURAL EFFUSIONS, POSSIBLE CONGESTIVE HEART FAILURE. BIBASILAR INFILTRATES/ATELECTASIS, LEFT GREATER THAN RIGHT. Ir Guided Thoracentesis Pleural    Technically successful ultrasound-guided thoracentesis without immediate complications. HISTORY: Lorenza Wynne is a Female of 80 years age. DIAGNOSIS: Left pleural effusion COMMENTS: I50.43 Acute on chronic combined systolic (congestive) and diastolic (congestive) heart failure (HCC) ICD10 PROCEDURE: Following the discussion of procedure, risks versus benefits, possible complications, informed consent was obtained. Following universal protocol, patient and site verification was performed with a \"timeout\" prior to the procedure. Brief survey of the patient's left hemithorax demonstrate moderate amount of pleural effusion. After selection of an appropriate site for thoracentesis, the thoracic skin was prepped and draped in usual sterile fashion. Under ultrasound guidance, using lidocaine for local anesthesia, a 19-gauge nodishes.co.ukeh catheter was inserted in the pleural cavity until effusion was aspirated. Using Vacutainer bottles, 730 mL of effusion was drained. The catheter was removed and the aspiration site dressed. The patient tolerated procedure well. No immediate complications were identified. Subsequent chest radiograph demonstrated no evidence of pneumothorax. The patient left the radiology department in stable condition. Radiology nurse monitored patient's  vital signs throughout the procedure which lasted approximately 30 minutes. Ir Guided Thoracentesis Pleural    Technically successful ultrasound-guided thoracentesis without immediate complications. HISTORY: Lorenza Wynne is a Female of 80 years age.  DIAGNOSIS: Left pleural effusion COMMENTS: I50.43 Acute on chronic combined systolic and diastolic CHF (congestive heart failure) (HCC) ICD10 PROCEDURE: Following the discussion of procedure, risks versus benefits, possible complications, informed consent was obtained. Following universal protocol, patient and site verification was performed with a \"timeout\" prior to the procedure. Brief survey of the patient's left hemithorax demonstrate moderate amount of pleural effusion. After selection of an appropriate site for thoracentesis, the thoracic skin was prepped and draped in usual sterile fashion. Under ultrasound guidance, using lidocaine for local anesthesia, a 19-gauge Wellframe catheter was inserted in the pleural cavity until effusion was aspirated. Using Vacutainer bottles, 755 mL of clear yellow effusion was drained. The catheter was removed and the aspiration site dressed. The patient tolerated procedure well. No immediate complications were identified. Subsequent chest radiograph demonstrated no evidence of pneumothorax. The patient left the radiology department in stable condition. Radiology nurse monitored patient's  vital signs throughout the procedure which lasted approximately 30 minutes.               Labs:     labs reviewed and discussed with patient and staff    Lab Results   Component Value Date    POCGLU 113 02/06/2021    POCGLU 134 02/06/2021     Lab Results   Component Value Date     02/20/2021    K 3.9 02/20/2021    K 4.1 02/13/2021     02/20/2021    CO2 27 02/20/2021    BUN 13 02/20/2021    CREATININE 0.58 02/20/2021    CALCIUM 8.7 02/20/2021    LABALBU 2.6 02/16/2021    BILITOT 0.3 02/16/2021    ALKPHOS 48 02/16/2021    AST 16 02/16/2021    ALT 14 02/16/2021     Lab Results   Component Value Date    WBC 7.6 02/20/2021    RBC 3.44 02/20/2021    HGB 9.9 02/20/2021    HCT 30.3 02/20/2021    MCV 87.9 02/20/2021    MCH 28.7 02/20/2021    MCHC 32.6 02/20/2021    RDW 14.4 02/20/2021     02/20/2021     Lab Results   Component Value Date    VITD25 56.0 10/02/2020     Lab Results   Component Value Date    COLORU Yellow 02/07/2021    NITRU Negative 02/07/2021    GLUCOSEU Negative 02/07/2021    KETUA Negative 02/07/2021    UROBILINOGEN 0.2 02/07/2021    BILIRUBINUR Negative 02/07/2021     Lab Results   Component Value Date    PROTIME 15.0 01/25/2021     Lab Results   Component Value Date    INR 1.2 01/25/2021         I discussed results with patient. The patient remains highly medically complex and continues to have severe problems with activities of daily living and mobility. The patient was assessed to be able to tolerate intensive rehabilitation and therefore was admitted to Rehabilitation to address these needs. The patient has been found to have severe abnormality of gait and mobility with impaired self care and is admitted to the acute inpatient rehab program.       Prior Function; everyday activities:     Social History     Socioeconomic History    Marital status:      Spouse name: Not on file    Number of children: Not on file    Years of education: Not on file    Highest education level: Not on file   Occupational History    Not on file   Social Needs    Financial resource strain: Not on file    Food insecurity     Worry: Not on file     Inability: Not on file    Transportation needs     Medical: Not on file     Non-medical: Not on file   Tobacco Use    Smoking status: Never Smoker    Smokeless tobacco: Never Used   Substance and Sexual Activity    Alcohol use: No     Alcohol/week: 0.0 standard drinks    Drug use: No    Sexual activity: Not on file   Lifestyle    Physical activity     Days per week: 0 days     Minutes per session: 0 min    Stress:  Only a little   Relationships    Social connections     Talks on phone: More than three times a week     Gets together: More than three times a week     Attends [employer] service: 1 to 4 times per year     Active member of club or organization: No     Attends meetings of clubs or organizations: Never     Relationship status:     Intimate partner violence     Fear of current or ex partner: No     Emotionally abused: No     Physically abused: No     Forced sexual activity: No   Other Topics Concern    Not on file   Social History Narrative    Lives With: Alone, son lives down the street, dtr is in the area    Has a son in the area    Type of Home: [address]: One level    Home Access: Stairs to enter with rails- Number of Steps: 2- Rails: Both    Bathroom Shower/Tub: Tub/Shower unit, Bathroom Equipment: Grab bars in shower, Shower chair    Home Equipment: Rolling walker, Cane(Pt infrequemtly uses DME for ambulation and prefers to furniture walk in home)    ADL Assistance: Independent, [address] [place] [address] Responsibilities: Yes    Ambulation Assistance: Independent, Transfer Assistance: Independent    Additional Comments: Son stops over 2 times daily         Social supports listed above. Prior Device(s) used:  As above    History of falls:  Rarely falls    In depth analysis of complex functional data; the patient has been:    Current Rehabilitation Assessments:    Physical therapy: FIMS:  Bed Mobility: Scooting: Minimal assistance    Transfers:Sit to Stand: Stand by assistance  Stand to sit: Stand by assistance  Bed to Chair: Stand by assistance, Ambulation 1  Surface: level tile  Device: Rolling Walker  Other Apparatus: O2  Assistance: Contact guard assistance  Quality of Gait: flexed posture, downward gaze, SOB upon exertion. Exagerated swing phase R?L.   Gait Deviations: Slow Lea, Increased JAMES  Distance: 80, 30  Comments: SP02 97% post ambulation, Stairs  # Steps : 4  Stairs Height: 6\"  Rails: Bilateral  Device: No Device  Assistance: Contact guard assistance  Comment: non reciprocal pattern, SOB post stair training, SpO2 95%    FIMS:  , , Ascending aortic aneurysm ([facility] 6/23/2017    Charcot Arleen Tooth muscular atrophy     Depression     Descending aortic aneurysm ([facility] 6/23/2017    Essential hypertension 2/16/2018    Headache     HTN (hypertension)     Impaired mobility and activities of daily living     Lumbar stenosis with neurogenic claudication     Myelopathy ([facility]     Osteoarthritis        Past Surgical History:   Procedure Laterality Date    JOINT REPLACEMENT      THORACENTESIS Left 01/29/2021    total of 755 cc removed per Dr Shey Godwin specimen sent to lab       Current Facility-Administered Medications   Medication Dose Route Frequency Provider Last Rate Last Admin    acetaminophen (TYLENOL) tablet 650 mg  650 mg Oral Q6H PRN Alize Taylor MD   650 mg at 02/19/21 2017    Or    acetaminophen (TYLENOL) suppository 650 mg  650 mg Rectal Q6H PRN Alize Taylor MD        polyethylene glycol (GLYCOLAX) packet 17 g  17 g Oral Daily PRN Alize Taylor MD        promethazine (PHENERGAN) tablet 12.5 mg  12.5 mg Oral Q6H PRN Alize Taylor MD        Or    ondansetron (ZOFRAN) injection 4 mg  4 mg Intravenous Q6H PRN Alize Taylor MD        sodium chloride flush 0.9 % injection 10 mL  10 mL Intravenous 2 times per day Alize Taylor MD   10 mL at 02/20/21 0908    sodium chloride flush 0.9 % injection 10 mL  10 mL Intravenous PRN Alize Taylor MD        aspirin chewable tablet 81 mg  81 mg Oral Daily Alize Taylor MD   81 mg at 02/20/21 0906    budesonide-formoterol (SYMBICORT) 160-4.5 MCG/ACT inhaler 2 puff  2 puff Inhalation BID Alize Taylor MD   2 puff at 02/20/21 0612    carvedilol (COREG) tablet 3.125 mg  3.125 mg Oral BID Alize Taylor MD   3.125 mg at 02/20/21 0906    citalopram (CELEXA) tablet 20 mg  20 mg Oral Daily Alize Taylor MD   20 mg at 02/20/21 0906    digoxin (LANOXIN) tablet 125 mcg  125 mcg Oral Daily Alize Taylor MD   125 mcg at 02/20/21 0906    furosemide (LASIX) tablet 20 mg  20 mg Oral Daily Isaias Solange Schaeffer MD   20 mg at 02/20/21 0906    levothyroxine (SYNTHROID) tablet 88 mcg  88 mcg Oral Daily Neda Fraser MD   88 mcg at 02/20/21 0513    LORazepam (ATIVAN) tablet 0.5 mg  0.5 mg Oral Q6H PRN Neda Fraser MD   0.5 mg at 02/19/21 2018    midodrine (PROAMATINE) tablet 5 mg  5 mg Oral TID WC Neda Fraser MD   5 mg at 02/20/21 1202    nitroGLYCERIN (NITROSTAT) SL tablet 0.4 mg  0.4 mg Sublingual Q5 Min PRN Neda Fraser MD        pantoprazole (PROTONIX) tablet 40 mg  40 mg Oral BID AC Neda Fraser MD   40 mg at 02/20/21 0604    potassium chloride (KLOR-CON M) extended release tablet 20 mEq  20 mEq Oral Daily with breakfast Neda Fraser MD   20 mEq at 02/20/21 3346    rivaroxaban (XARELTO) tablet 15 mg  15 mg Oral Daily Neda Fraser MD   15 mg at 02/19/21 2021    simethicone (MYLICON) chewable tablet 80 mg  80 mg Oral Q6H PRN Neda Fraser MD        vitamin B-12 (CYANOCOBALAMIN) tablet 1,000 mcg  1,000 mcg Oral Daily Neda Fraser MD   1,000 mcg at 02/20/21 5754       Allergies   Allergen Reactions    Latex     Tape Jameson Fredo Tape]                       FAMILY HISTORY:  Does not pertain tochief complaint. Review of Systems       Objective  BP (!) 147/90   Pulse 69   Temp 98.2 °F (36.8 °C) (Oral)   Resp 16   Ht 5' 3\" (1.6 m)   Wt 157 lb (71.2 kg)   SpO2 95%   BMI 27.81 kg/m² *    Physical Exam  Ortho Exam  Neurologic Exam         ROS x10: The patient also complains of severely impaired mobility and activities of daily living. Otherwise no new problems with vision, hearing, nose, mouth, throat, dermal, cardiovascular, GI, , pulmonary, musculoskeletal, psychiatric or neurological. See Rehab H&P on Rehab chart dated . Vital signs:  BP (!) 147/90   Pulse 69   Temp 98.2 °F (36.8 °C) (Oral)   Resp 16   Ht 5' 3\" (1.6 m)   Wt 157 lb (71.2 kg)   SpO2 95%   BMI 27.81 kg/m²   I/O:   PO/Intake:  fair PO intake, no problems observed or reported.     Bowel/Bladder:  continent, no problems noted. General:  Patient is well developed, adequately nourished, non-obese and     well kempt. HEENT:    PERRLA, hearing intact to loud voice, external inspection of ear     and nose benign. Inspection of lips, tongue and gums benign  Musculoskeletal: No significant change in strength or tone. All joints stable. Inspection and palpation of digits and nails show no clubbing,       cyanosis or inflammatory conditions. Neuro/Psychiatric: Affect: flat but pleasant. Alert and oriented to person, place and     Situation with    cues. No significant change in deep tendon reflexes or     sensation  Lungs:  Diminished, CTA-B. Respiration effort is normal at rest.     Heart:   S1 = S2, RRR. No loud murmurs. Abdomen:  Soft, non-tender, no enlargement of liver or spleen. Extremities:  No significant lower extremity edema or tenderness. Skin:   Intact to general survey, no visualized or palpated problems. Rehabilitation:  Physical therapy:   Bed Mobility: Scooting: Minimal assistance    Transfers: Sit to Stand: Stand by assistance  Stand to sit: Stand by assistance  Bed to Chair: Stand by assistance, Ambulation 1  Surface: level tile  Device: Rolling Walker  Other Apparatus: O2  Assistance: Contact guard assistance  Quality of Gait: flexed posture, downward gaze, SOB upon exertion. Exagerated swing phase R?L. Gait Deviations: Slow Lea, Increased JAMES  Distance: 80, 30  Comments: SP02 97% post ambulation, Stairs  # Steps : 4  Stairs Height: 6\"  Rails: Bilateral  Device: No Device  Assistance: Contact guard assistance  Comment: non reciprocal pattern, SOB post stair training, SpO2 95%    FIMS:  ,  , Assessment: Pt well motivated . SAO2 stayed between 96-98% on RA. Pt required brief rest breaks x 3 during session. VC to improve posture and step into walker.     Occupational therapy:    ,  , Assessment: Pt is 79 y/o female presenting with the above deficits, resulting in increased dependence for safe competion of self care ADLs. Pt limited by weakness/decreased endurance and poor  strength for fine motor areas of care. Pt would benefit from OT to improve on condition to maximize safety and independence with functional transfers, mobility an self care. Speech therapy:     After extensive review of the records and above physical exam, I have formulated the followingdiagnoses and plan:      DIAGNOSES:    1. The patient was admitted to the acute rehabilitation unit with the primary rehab diagnoses being severe abnormality of gait and mobility andimpaired self care and ADL's due to  CHF exacerbation, thoracentesis recurrent effusion likely secondary to malignancy    Compared to Pre-Admission Assessment, patients medical and functional status remain challengingly complex and patient continues to requireintensive therapeutic intervention from multiple therapies, therefore, initiate acute intensive comprehensive inpatient rehabilitation program including PT/OT to improve balance, ambulation, ADLs, and to improve the P/AROM. Functional and medical status reassessed regarding patients ability to participate in therapies and patient found to be able to participate in acute intensivecomprehensive inpatient rehabilitation program.  Therapeutic modifications regarding activities in therapies, place, amount of time per day and intensity of therapy made daily. Enroll in acute course of therapy program to include 1/2 hours per day of PT 5 to 7days per week and 1 1/2 hours per day of OT 5 to 7 days per week, and   SLP and Rec T 1/2 hour per day 3-5 days per week. The patient is stable medically and physically on previous exam.  When necessary therapy will be spread out over a 7-day window.      This patient present with significant new onset decreased mobility andinability to perform activities of daily living skills independently and is at significant risk for prolonged disability  For this reason they have been admitted to [facility]. Thepatient's current functional and medical status are highly complex but the patient is able to participate in intensive rehabilitation. A comprehensive inpatient rehabilitation program is appropriate. The patient Lalo Rojas initial evaluation by the rehabilitation team and be discussed at regular treatment team meetings to assess progress, mobility, self care, mood and discharge issues. Physical therapy will be consulted for mobilityand endurance issues and should be performed 1 to 2 times per day, 7 days per week for the length of stay. Occupational therapy will be consulted for activities of daily living and should be performed 1 to 2 times per day,7 days per week for the length of stay. Their capacity to participate at an acute level, decision to be treated in the gym, room or on the unit, their activity goals for the day and the number of minutes of activeparticipation will be reassessed and re-prescribed daily. Because this patient is medically complex, I will check a CBC, BMP, UA and orthostatic blood pressures. They will be reassessed daily regarding their ability toparticipate in an acute level rehabilitation program.  Recreational Therapy will be consulted for community re-entry and adjustment to disability. Communication, cognitive and emotional issues will also be addressed duringthis rehabilitation stay by rehabilitation psychologist or speech therapist as appropriate. I reviewed the patient's old and current charts and discussed other rehabilitation options with the rehabilitation teamincluding the rehab RN and the admission team as well as the patient. I feel that the patient's functional recovery would be best served at an acute inpatient rehabilitation program because the patient needs intense therapy three hours per day, direct RN supervision and daily monitoring by a physician for medical status.  This cannot be sufficiently provided by home health care, a skilled nursing facility or in an outpatient setting. I further feel that the patient has the potential to improve functional abilities in an acute intensive rehabilitation program.    Old records were reviewed and summarized. 2.  Other diagnoses which complicate rehabilitation stay include: Active Problems:    Impaired mobility  Resolved Problems:    * No resolved hospital problems. *    Patient Active Problem List   Diagnosis    Lumbar stenosis with neurogenic claudication    Acquired scoliosis    Osteoporosis    Charcot Arleen Tooth muscular atrophy    Excess weight    Osteoarthritis of lumbar spine with myelopathy    Osteoarthritis    Myelopathy ([address]    Impaired mobility and activities of daily living due to CHF exac, [facility] admit 2021    Headache    Depression    Ascending aortic aneurysm (HCC)    Descending aortic aneurysm (HCC)    Dizziness    Shortness of breath    Essential hypertension    Acute on chronic combined systolic and diastolic CHF (congestive heart failure) (HCC)    Gait abnormality    Paroxysmal atrial fibrillation (HCC)    Pleural effusion    Hypoxia    Acute pulmonary edema (HCC)    Acute on chronic combined systolic (congestive) and diastolic (congestive) heart failure (HCC)    Impaired mobility                  I am especially concerned about their recent medical complexities. The patient's high risk medication use includes  See javed. The patient is high risk for urinary tract infection, an admission urinalysis has been ordered. I will have the nurses check post void residual bladder volumes and place acatheter if excessive urine is retained in the bladder after voiding. The patient is risk for deep venous thromosis, complex deep venous thrombosis protocol prophylaxis has been ordered see mar .     The patient is high risk for orthostasis and a hydration program and orthostatic blood pressure screening have been ordered. I will attempt to get old records from the patient's previous hospital stay. Care everywhere on EPIC wasutilized. 3.  Current and previous medications were reviewed and summarized and compared to old medication lists from home and from the acute floor. 4.  Complex labs and x-rays were reviewed. I will review patient's old EKG and labs. 5.  Will provide emotional support for this patient regarding adjustment to their disability. Cognition and mood will be screened daily and addressed by rehabilitationpsychology and/or speech therapy as appropriate. I have encouraged the patient to attend the Rehab Adjustment to Disability Support Group and recreational therapy. 6.  Estimated length of stay is 2-3 weeks. Discharge to home with help from family and home health PT, OT, RN, and aide. Patient should be independent at discharge. 7.  The patient's medical and rehab prognosis are good. [address] regarding the patient's back up to general medical needs. A welcome letter was presented with an explanation of my services, my specialty and what to expect during the rehabilitation process. As well as introducing myself, I also wrote my name on their bedside marker board with their name as well as the names of the other physicians with an explanation of our individual roles in their care, as well as the rehab process.       Ines Rubi D.O., F.A.A.P.M.&DAISY.

## 2021-02-21 PROBLEM — N30.01 ACUTE CYSTITIS WITH HEMATURIA: Status: ACTIVE | Noted: 2021-02-21

## 2021-02-21 LAB
ANION GAP SERPL CALCULATED.3IONS-SCNC: 8 MEQ/L (ref 9–15)
BILIRUBIN URINE: NEGATIVE
BLOOD, URINE: ABNORMAL
BUN BLDV-MCNC: 14 MG/DL (ref 8–23)
CALCIUM SERPL-MCNC: 8.8 MG/DL (ref 8.5–9.9)
CHLORIDE BLD-SCNC: 103 MEQ/L (ref 95–107)
CLARITY: ABNORMAL
CO2: 28 MEQ/L (ref 20–31)
COLOR: ABNORMAL
CREAT SERPL-MCNC: 0.62 MG/DL (ref 0.5–0.9)
GFR AFRICAN AMERICAN: >60
GFR NON-AFRICAN AMERICAN: >60
GLUCOSE BLD-MCNC: 102 MG/DL (ref 70–99)
GLUCOSE URINE: NEGATIVE MG/DL
HCT VFR BLD CALC: 30.1 % (ref 37–47)
HEMOGLOBIN: 10 G/DL (ref 12–16)
KETONES, URINE: NEGATIVE MG/DL
LEUKOCYTE ESTERASE, URINE: ABNORMAL
MCH RBC QN AUTO: 29.2 PG (ref 27–31.3)
MCHC RBC AUTO-ENTMCNC: 33.4 % (ref 33–37)
MCV RBC AUTO: 87.4 FL (ref 82–100)
NITRITE, URINE: POSITIVE
PDW BLD-RTO: 14.6 % (ref 11.5–14.5)
PH UA: 5 (ref 5–9)
PLATELET # BLD: 276 K/UL (ref 130–400)
POTASSIUM SERPL-SCNC: 3.9 MEQ/L (ref 3.4–4.9)
PROTEIN UA: 30 MG/DL
RBC # BLD: 3.44 M/UL (ref 4.2–5.4)
SODIUM BLD-SCNC: 139 MEQ/L (ref 135–144)
SPECIFIC GRAVITY UA: 1.02 (ref 1–1.03)
URINE REFLEX TO CULTURE: YES
UROBILINOGEN, URINE: 0.2 E.U./DL
WBC # BLD: 8.3 K/UL (ref 4.8–10.8)

## 2021-02-21 PROCEDURE — 6370000000 HC RX 637 (ALT 250 FOR IP): Performed by: INTERNAL MEDICINE

## 2021-02-21 PROCEDURE — 6370000000 HC RX 637 (ALT 250 FOR IP): Performed by: PHYSICAL MEDICINE & REHABILITATION

## 2021-02-21 PROCEDURE — 80048 BASIC METABOLIC PNL TOTAL CA: CPT

## 2021-02-21 PROCEDURE — 2700000000 HC OXYGEN THERAPY PER DAY

## 2021-02-21 PROCEDURE — 94640 AIRWAY INHALATION TREATMENT: CPT

## 2021-02-21 PROCEDURE — 36415 COLL VENOUS BLD VENIPUNCTURE: CPT

## 2021-02-21 PROCEDURE — 93005 ELECTROCARDIOGRAM TRACING: CPT | Performed by: INTERNAL MEDICINE

## 2021-02-21 PROCEDURE — 1180000000 HC REHAB R&B

## 2021-02-21 PROCEDURE — 85027 COMPLETE CBC AUTOMATED: CPT

## 2021-02-21 PROCEDURE — 94761 N-INVAS EAR/PLS OXIMETRY MLT: CPT

## 2021-02-21 PROCEDURE — 99232 SBSQ HOSP IP/OBS MODERATE 35: CPT | Performed by: PHYSICAL MEDICINE & REHABILITATION

## 2021-02-21 RX ORDER — SULFAMETHOXAZOLE AND TRIMETHOPRIM 800; 160 MG/1; MG/1
1 TABLET ORAL 2 TIMES DAILY WITH MEALS
Status: DISCONTINUED | OUTPATIENT
Start: 2021-02-21 | End: 2021-03-01

## 2021-02-21 RX ORDER — L. ACIDOPHILUS/L.BULGARICUS 1MM CELL
2 TABLET ORAL 3 TIMES DAILY
Status: DISCONTINUED | OUTPATIENT
Start: 2021-02-21 | End: 2021-03-02 | Stop reason: HOSPADM

## 2021-02-21 RX ORDER — SULFAMETHOXAZOLE AND TRIMETHOPRIM 800; 160 MG/1; MG/1
1 TABLET ORAL EVERY 12 HOURS SCHEDULED
Status: DISCONTINUED | OUTPATIENT
Start: 2021-02-21 | End: 2021-02-21

## 2021-02-21 RX ADMIN — BUDESONIDE AND FORMOTEROL FUMARATE DIHYDRATE 2 PUFF: 160; 4.5 AEROSOL RESPIRATORY (INHALATION) at 05:10

## 2021-02-21 RX ADMIN — PANTOPRAZOLE SODIUM 40 MG: 40 TABLET, DELAYED RELEASE ORAL at 06:05

## 2021-02-21 RX ADMIN — CITALOPRAM HYDROBROMIDE 20 MG: 20 TABLET ORAL at 08:07

## 2021-02-21 RX ADMIN — LACTOBACILLUS TAB 2 TABLET: TAB at 12:24

## 2021-02-21 RX ADMIN — CARVEDILOL 3.12 MG: 3.12 TABLET, FILM COATED ORAL at 20:38

## 2021-02-21 RX ADMIN — PANTOPRAZOLE SODIUM 40 MG: 40 TABLET, DELAYED RELEASE ORAL at 17:24

## 2021-02-21 RX ADMIN — POTASSIUM CHLORIDE 20 MEQ: 20 TABLET, EXTENDED RELEASE ORAL at 08:08

## 2021-02-21 RX ADMIN — MIDODRINE HYDROCHLORIDE 5 MG: 5 TABLET ORAL at 17:25

## 2021-02-21 RX ADMIN — ASPIRIN 81 MG: 81 TABLET, CHEWABLE ORAL at 08:07

## 2021-02-21 RX ADMIN — MIDODRINE HYDROCHLORIDE 5 MG: 5 TABLET ORAL at 08:07

## 2021-02-21 RX ADMIN — LEVOTHYROXINE SODIUM 88 MCG: 88 TABLET ORAL at 06:05

## 2021-02-21 RX ADMIN — MIDODRINE HYDROCHLORIDE 5 MG: 5 TABLET ORAL at 12:24

## 2021-02-21 RX ADMIN — SULFAMETHOXAZOLE AND TRIMETHOPRIM 1 TABLET: 800; 160 TABLET ORAL at 08:08

## 2021-02-21 RX ADMIN — LACTOBACILLUS TAB 2 TABLET: TAB at 17:28

## 2021-02-21 RX ADMIN — BUDESONIDE AND FORMOTEROL FUMARATE DIHYDRATE 2 PUFF: 160; 4.5 AEROSOL RESPIRATORY (INHALATION) at 15:33

## 2021-02-21 RX ADMIN — CARVEDILOL 3.12 MG: 3.12 TABLET, FILM COATED ORAL at 00:15

## 2021-02-21 RX ADMIN — CYANOCOBALAMIN TAB 500 MCG 1000 MCG: 500 TAB at 08:07

## 2021-02-21 RX ADMIN — CARVEDILOL 3.12 MG: 3.12 TABLET, FILM COATED ORAL at 08:08

## 2021-02-21 RX ADMIN — SULFAMETHOXAZOLE AND TRIMETHOPRIM 1 TABLET: 800; 160 TABLET ORAL at 17:24

## 2021-02-21 RX ADMIN — FUROSEMIDE 20 MG: 20 TABLET ORAL at 08:08

## 2021-02-21 RX ADMIN — RIVAROXABAN 15 MG: 15 TABLET, FILM COATED ORAL at 17:25

## 2021-02-21 RX ADMIN — LACTOBACILLUS TAB 2 TABLET: TAB at 20:38

## 2021-02-21 RX ADMIN — DIGOXIN 125 MCG: 125 TABLET ORAL at 08:08

## 2021-02-21 NOTE — PROGRESS NOTES
Subjective: The patient complains of  moderate to severe acute    CHF partially relieved by rest, PT, OT,   and exacerbated by recent illness and exertion. I am concerned about patients  frailty and bleeding risk on Xarelto. Her exacerbation of CHF seems to be improving on monitoring her blood pressure closely pulmonology and cardiology are involved she has diuresed and has a Taylor catheter in. She may require a VATS procedure sometime this week. Pleurx catheter planned for 2/23/2021. I used a translation application to help talk to her she states that she is dizzy when she stands up which is an old problem. She does have a low BP and is on midodrine I will add abdominal binder and teds. She has a bladder infection and is on Bactrim. According to recent nursing note, \" Patient arrived from [address] [place] [address] via wheelchair earlier. Patient alert and able to answer simple questions. Admission assessment completed. Changed telemetry box patient is SR 76. Patient states it is difficult for her to understand us and communicate with these masks on. Will complete as much of admission as possible tonight. \".    ROS x10: The patient also complains of severely impaired mobility and activities of daily living. Otherwise no new problems with vision, hearing, nose, mouth, throat, dermal, cardiovascular, GI, , pulmonary, musculoskeletal, psychiatric or neurological. See Rehab H&P on Rehab chart dated . Vital signs:  /76   Pulse 58   Temp 98 °F (36.7 °C) (Oral)   Resp 20   Ht 5' 3\" (1.6 m)   Wt 157 lb (71.2 kg)   SpO2 100%   BMI 27.81 kg/m²   I/O:   PO/Intake:  fair PO intake, no problems observed or reported. Bowel/Bladder:   UTI Bactrim, constipation  ,taylor  General:  Patient is well developed, adequately nourished, non-obese and     well kempt. HEENT:    PERRLA, hearing intact to loud voice, external inspection of ear     and nose benign.   Inspection of lips, tongue and gums benign  Musculoskeletal: No significant change in strength or tone. All joints stable. Inspection and palpation of digits and nails show no clubbing,       cyanosis or inflammatory conditions. Neuro/Psychiatric: Affect: flat. Alert and oriented to person, place and     situation. No significant change in deep tendon reflexes or     sensation  Lungs:  Diminished, CTA-B. Respiration effort is normal at rest.     Heart:   S1 = S2, RRR. No loud murmurs. Abdomen:  Soft, non-tender, no enlargement of liver or spleen. Extremities:  No significant lower extremity edema or tenderness. Skin:   Intact to general survey, no visualized or palpated problems. Rehabilitation:  Physical therapy: FIMS:  Bed Mobility: Scooting: Minimal assistance    Transfers: Sit to Stand: Stand by assistance  Stand to sit: Stand by assistance  Bed to Chair: Stand by assistance, Ambulation 1  Surface: level tile  Device: Rolling Walker  Other Apparatus: O2  Assistance: Contact guard assistance  Quality of Gait: flexed posture, downward gaze, SOB upon exertion. Exagerated swing phase R?L. Gait Deviations: Slow Lea, Increased JAMES  Distance: 80, 30  Comments: SP02 97% post ambulation, Stairs  # Steps : 4  Stairs Height: 6\"  Rails: Bilateral  Device: No Device  Assistance: Contact guard assistance  Comment: non reciprocal pattern, SOB post stair training, SpO2 95%    FIMS:  ,  , Assessment: Pt well motivated . SAO2 stayed between 96-98% on RA. Pt required brief rest breaks x 3 during session. VC to improve posture and step into walker. Occupational therapy: FIMS:   ,  , Assessment: Pt is 79 y/o female presenting with the above deficits, resulting in increased dependence for safe competion of self care ADLs. Pt limited by weakness/decreased endurance and poor  strength for fine motor areas of care.  Pt would benefit from OT to improve on condition to maximize safety and independence with functional transfers, mobility an self care.    Speech therapy: FIMS:        Lab/X-ray studies reviewed, analyzed and discussed with patient and staff:   Recent Results (from the past 24 hour(s))   URINE RT REFLEX TO CULTURE    Collection Time: 02/20/21 10:09 PM    Specimen: Urine, clean catch   Result Value Ref Range    Color, UA ORANGE (A) Straw/Yellow    Clarity, UA TURBID (A) Clear    Glucose, Ur Negative Negative mg/dL    Bilirubin Urine Negative Negative    Ketones, Urine Negative Negative mg/dL    Specific Gravity, UA 1.016 1.005 - 1.030    Blood, Urine LARGE (A) Negative    pH, UA 5.0 5.0 - 9.0    Protein, UA 30 (A) Negative mg/dL    Urobilinogen, Urine 0.2 <2.0 E.U./dL    Nitrite, Urine POSITIVE (A) Negative    Leukocyte Esterase, Urine MODERATE (A) Negative   EKG 12 Lead    Collection Time: 02/21/21  5:45 AM   Result Value Ref Range    Ventricular Rate 66 BPM    Atrial Rate 66 BPM    P-R Interval 214 ms    QRS Duration 142 ms    Q-T Interval 440 ms    QTc Calculation (Bazett) 461 ms    P Axis 66 degrees    R Axis -62 degrees    T Axis -20 degrees   Basic Metabolic Panel    Collection Time: 02/21/21  6:15 AM   Result Value Ref Range    Sodium 139 135 - 144 mEq/L    Potassium 3.9 3.4 - 4.9 mEq/L    Chloride 103 95 - 107 mEq/L    CO2 28 20 - 31 mEq/L    Anion Gap 8 (L) 9 - 15 mEq/L    Glucose 102 (H) 70 - 99 mg/dL    BUN 14 8 - 23 mg/dL    CREATININE 0.62 0.50 - 0.90 mg/dL    GFR Non-African American >60.0 >60    GFR  >60.0 >60    Calcium 8.8 8.5 - 9.9 mg/dL   CBC    Collection Time: 02/21/21  6:15 AM   Result Value Ref Range    WBC 8.3 4.8 - 10.8 K/uL    RBC 3.44 (L) 4.20 - 5.40 M/uL    Hemoglobin 10.0 (L) 12.0 - 16.0 g/dL    Hematocrit 30.1 (L) 37.0 - 47.0 %    MCV 87.4 82.0 - 100.0 fL    MCH 29.2 27.0 - 31.3 pg    MCHC 33.4 33.0 - 37.0 %    RDW 14.6 (H) 11.5 - 14.5 %    Platelets 864 829 - 660 K/uL       Previous extensive, complex labs, notes and diagnostics reviewed and analyzed.      ALLERGIES:    Allergies as of 02/19/2021 - Review Complete 02/19/2021   Allergen Reaction Noted    Latex  07/19/2017    Tape [adhesive tape]  06/28/2016      (please also verify by checking [facility])     Complex Physical Medicine & Rehab Issues Assess & Plan:   1. Severe abnormality of gait and mobility and impaired self-care and ADL's secondary to progressive weakness dt   CHF . Functional and medical status reassessed regarding patients ability to participate in therapies and patient found to be able to participate in acute intensive comprehensive inpatient rehabilitation program including PT/OT to improve balance, ambulation, ADLs, and to improve the P/AROM. Therapeutic modifications regarding activities in therapies, place, amount of time per day and intensity of therapy made daily. In bed therapies or bedside therapies prn.   2. Bowel progressive constipation, and Bladder dysfunction monitoring neurogenic bladder:  frequent toileting, ambulate to bathroom with assistance, check post void residuals. Check for C.difficile x1 if >2 loose stools in 24 hours, continue bowel & bladder program.  Monitor bowel and bladder function. Lactinex 2 PO every AC. MOM prn, Brown Bomb prn, Glycerin suppository prn, enema prn.  3. Severe lbp pain as well as generalized OA pain: reassess pain every shift and prior to and after each therapy session, give prn medications, modalities prn in therapy, Lidoderm, K-pad prn.   4. Skin healing and breakdown risk:  continue pressure relief program.  Daily skin exams and reports from nursing. 5. Severe fatigue due to nutritional and hydration deficiency: Add vitamin B12 vitamin D and CoQ10 continue to monitor I&Os, calorie counts prn, dietary consult prn. Add healthy HS snack. 6. Acute episodic insomnia with situational adjustment disorder:  prn Ambien, monitor for day time sedation. Add HS \"Tuck In\"  7. Falls risk elevated:  patient to use call light to get nursing assistance to get up, bed and chair alarm.   8. Elevated DVT risk: progressive activities in PT, continue prophylaxis PORTILLO hose, elevation and Xarelto. 9. Complex discharge planning: Discharge home alone with help from her son and daughter as well as home health care. She is status post weekly team meeting every Monday to assess progress towards goals, discuss and address social, psychological and medical comorbidities and to address difficulties they may be having progressing in therapy. Patient and family education is in progress. The patient is to follow-up with their family physician after discharge. Complex Active General Medical Issues that complicate care Assess & Plan:     1. Principal Problem:    Impaired mobility and activities of daily living due to CHF ex, [facility] admit 2021  Active Problems:  1. Paroxysmal atrial fibrillation,  Essential hypertension with low blood pressures of late, recurrent pleural effusions acute on chronic combined systolic and diastolic CHF (congestive heart failure)-continue blood signs every shift focusing on heart rate and blood pressure checks, consult hospitalist for backup medical and adjust/add medications (aspirin, Coreg, Cardizem, Lasix, Nitrostat) plan as above consult cardiology titrate IV Lasix recheck chest x-ray wean high flow oxygen patient may require a VATS procedure. Midodrine titration add abdominal binders and teds  2. Acute UTI-Bactrim and monitor for residual urine in the bladder dose Bactrim with food  3. Lumbar stenosis with neurogenic claudication  4. Charcot Arleen Tooth muscular atrophy, Osteoarthritis of lumbar spine with myelopathy,  Osteoarthritis  5. Severe depression and anxiety -emotional support provided daily, vitamin B12, encourage participation in rehabilitation support group and recreational therapy, adjust/add medications (Celexa Ativan)  6.  Hypothyroidism-titrate Synthroid            Mejia Cisse D.O., PM&R     Attending    [facility] Pamela Cabrera

## 2021-02-21 NOTE — PLAN OF CARE
Problem: Falls - Risk of:  Goal: Will remain free from falls  Description: Will remain free from falls  Outcome: Ongoing  Goal: Absence of physical injury  Description: Absence of physical injury  Outcome: Ongoing     Problem: Mobility - Impaired:  Goal: Mobility will improve  Description: Mobility will improve  Outcome: Ongoing     Problem: Skin Integrity:  Goal: Will show no infection signs and symptoms  Description: Will show no infection signs and symptoms  Outcome: Ongoing  Goal: Absence of new skin breakdown  Description: Absence of new skin breakdown  Outcome: Ongoing

## 2021-02-22 ENCOUNTER — CLINICAL DOCUMENTATION (OUTPATIENT)
Dept: CARDIOTHORACIC SURGERY | Age: 86
End: 2021-02-22

## 2021-02-22 ENCOUNTER — SURGICAL CONSULT (OUTPATIENT)
Dept: CARDIOTHORACIC SURGERY | Age: 86
End: 2021-02-22

## 2021-02-22 LAB
ANION GAP SERPL CALCULATED.3IONS-SCNC: 8 MEQ/L (ref 9–15)
BUN BLDV-MCNC: 12 MG/DL (ref 8–23)
CALCIUM SERPL-MCNC: 8.8 MG/DL (ref 8.5–9.9)
CHLORIDE BLD-SCNC: 103 MEQ/L (ref 95–107)
CO2: 27 MEQ/L (ref 20–31)
CREAT SERPL-MCNC: 0.74 MG/DL (ref 0.5–0.9)
EKG ATRIAL RATE: 62 BPM
EKG ATRIAL RATE: 63 BPM
EKG ATRIAL RATE: 68 BPM
EKG P AXIS: 112 DEGREES
EKG P AXIS: 61 DEGREES
EKG P AXIS: 69 DEGREES
EKG P-R INTERVAL: 204 MS
EKG P-R INTERVAL: 208 MS
EKG P-R INTERVAL: 214 MS
EKG Q-T INTERVAL: 440 MS
EKG Q-T INTERVAL: 442 MS
EKG Q-T INTERVAL: 486 MS
EKG QRS DURATION: 140 MS
EKG QRS DURATION: 142 MS
EKG QRS DURATION: 142 MS
EKG QTC CALCULATION (BAZETT): 448 MS
EKG QTC CALCULATION (BAZETT): 450 MS
EKG QTC CALCULATION (BAZETT): 516 MS
EKG R AXIS: -58 DEGREES
EKG R AXIS: -63 DEGREES
EKG R AXIS: 223 DEGREES
EKG T AXIS: -41 DEGREES
EKG T AXIS: -59 DEGREES
EKG T AXIS: 243 DEGREES
EKG VENTRICULAR RATE: 62 BPM
EKG VENTRICULAR RATE: 63 BPM
EKG VENTRICULAR RATE: 68 BPM
GFR AFRICAN AMERICAN: >60
GFR NON-AFRICAN AMERICAN: >60
GLUCOSE BLD-MCNC: 106 MG/DL (ref 70–99)
HCT VFR BLD CALC: 29.1 % (ref 37–47)
HEMOGLOBIN: 9.8 G/DL (ref 12–16)
MCH RBC QN AUTO: 29 PG (ref 27–31.3)
MCHC RBC AUTO-ENTMCNC: 33.5 % (ref 33–37)
MCV RBC AUTO: 86.7 FL (ref 82–100)
PDW BLD-RTO: 14.1 % (ref 11.5–14.5)
PLATELET # BLD: 274 K/UL (ref 130–400)
POTASSIUM SERPL-SCNC: 4 MEQ/L (ref 3.4–4.9)
RBC # BLD: 3.36 M/UL (ref 4.2–5.4)
SODIUM BLD-SCNC: 138 MEQ/L (ref 135–144)
WBC # BLD: 9.5 K/UL (ref 4.8–10.8)

## 2021-02-22 PROCEDURE — 6370000000 HC RX 637 (ALT 250 FOR IP): Performed by: INTERNAL MEDICINE

## 2021-02-22 PROCEDURE — 97530 THERAPEUTIC ACTIVITIES: CPT

## 2021-02-22 PROCEDURE — 99222 1ST HOSP IP/OBS MODERATE 55: CPT | Performed by: INTERNAL MEDICINE

## 2021-02-22 PROCEDURE — 85027 COMPLETE CBC AUTOMATED: CPT

## 2021-02-22 PROCEDURE — 93010 ELECTROCARDIOGRAM REPORT: CPT | Performed by: INTERNAL MEDICINE

## 2021-02-22 PROCEDURE — 97116 GAIT TRAINING THERAPY: CPT

## 2021-02-22 PROCEDURE — 97110 THERAPEUTIC EXERCISES: CPT

## 2021-02-22 PROCEDURE — 94640 AIRWAY INHALATION TREATMENT: CPT

## 2021-02-22 PROCEDURE — 2700000000 HC OXYGEN THERAPY PER DAY

## 2021-02-22 PROCEDURE — 36415 COLL VENOUS BLD VENIPUNCTURE: CPT

## 2021-02-22 PROCEDURE — 1180000000 HC REHAB R&B

## 2021-02-22 PROCEDURE — 97112 NEUROMUSCULAR REEDUCATION: CPT

## 2021-02-22 PROCEDURE — 97535 SELF CARE MNGMENT TRAINING: CPT

## 2021-02-22 PROCEDURE — 6370000000 HC RX 637 (ALT 250 FOR IP): Performed by: PHYSICAL MEDICINE & REHABILITATION

## 2021-02-22 PROCEDURE — 93005 ELECTROCARDIOGRAM TRACING: CPT | Performed by: INTERNAL MEDICINE

## 2021-02-22 PROCEDURE — 80048 BASIC METABOLIC PNL TOTAL CA: CPT

## 2021-02-22 PROCEDURE — 99233 SBSQ HOSP IP/OBS HIGH 50: CPT | Performed by: PHYSICAL MEDICINE & REHABILITATION

## 2021-02-22 PROCEDURE — 94761 N-INVAS EAR/PLS OXIMETRY MLT: CPT

## 2021-02-22 RX ADMIN — LACTOBACILLUS TAB 2 TABLET: TAB at 13:10

## 2021-02-22 RX ADMIN — ACETAMINOPHEN 650 MG: 325 TABLET ORAL at 21:42

## 2021-02-22 RX ADMIN — LACTOBACILLUS TAB 2 TABLET: TAB at 09:06

## 2021-02-22 RX ADMIN — PANTOPRAZOLE SODIUM 40 MG: 40 TABLET, DELAYED RELEASE ORAL at 18:44

## 2021-02-22 RX ADMIN — BUDESONIDE AND FORMOTEROL FUMARATE DIHYDRATE 2 PUFF: 160; 4.5 AEROSOL RESPIRATORY (INHALATION) at 04:52

## 2021-02-22 RX ADMIN — CITALOPRAM HYDROBROMIDE 20 MG: 20 TABLET ORAL at 09:06

## 2021-02-22 RX ADMIN — DIGOXIN 125 MCG: 125 TABLET ORAL at 09:07

## 2021-02-22 RX ADMIN — PANTOPRAZOLE SODIUM 40 MG: 40 TABLET, DELAYED RELEASE ORAL at 07:04

## 2021-02-22 RX ADMIN — SULFAMETHOXAZOLE AND TRIMETHOPRIM 1 TABLET: 800; 160 TABLET ORAL at 18:44

## 2021-02-22 RX ADMIN — BUDESONIDE AND FORMOTEROL FUMARATE DIHYDRATE 2 PUFF: 160; 4.5 AEROSOL RESPIRATORY (INHALATION) at 18:07

## 2021-02-22 RX ADMIN — LEVOTHYROXINE SODIUM 88 MCG: 88 TABLET ORAL at 07:04

## 2021-02-22 RX ADMIN — MIDODRINE HYDROCHLORIDE 5 MG: 5 TABLET ORAL at 09:07

## 2021-02-22 RX ADMIN — RIVAROXABAN 15 MG: 15 TABLET, FILM COATED ORAL at 18:45

## 2021-02-22 RX ADMIN — SULFAMETHOXAZOLE AND TRIMETHOPRIM 1 TABLET: 800; 160 TABLET ORAL at 09:06

## 2021-02-22 RX ADMIN — CARVEDILOL 3.12 MG: 3.12 TABLET, FILM COATED ORAL at 21:42

## 2021-02-22 RX ADMIN — LACTOBACILLUS TAB 2 TABLET: TAB at 21:42

## 2021-02-22 RX ADMIN — MIDODRINE HYDROCHLORIDE 5 MG: 5 TABLET ORAL at 13:03

## 2021-02-22 RX ADMIN — ASPIRIN 81 MG: 81 TABLET, CHEWABLE ORAL at 09:09

## 2021-02-22 RX ADMIN — CYANOCOBALAMIN TAB 500 MCG 1000 MCG: 500 TAB at 09:06

## 2021-02-22 RX ADMIN — POTASSIUM CHLORIDE 20 MEQ: 20 TABLET, EXTENDED RELEASE ORAL at 09:06

## 2021-02-22 ASSESSMENT — PAIN SCALES - GENERAL
PAINLEVEL_OUTOF10: 0
PAINLEVEL_OUTOF10: 5
PAINLEVEL_OUTOF10: 0
PAINLEVEL_OUTOF10: 0

## 2021-02-22 ASSESSMENT — PAIN DESCRIPTION - LOCATION
LOCATION: ABDOMEN
LOCATION: GENERALIZED
LOCATION: RIB CAGE

## 2021-02-22 NOTE — PROGRESS NOTES
Pt was explained the procedure and consent was signed. Pt states she \"doesn't understand the procedure but is willing to have it\". Pt son POA was present during consent signing, pt POA signed form per pt request. Pt is sitting at side of bed w/son eating dinner at this time.  Electronically signed by Maria Alejandra Weston RN on 2/22/2021 at 5:32 PM

## 2021-02-22 NOTE — CARE COORDINATION
Social/Functional Status:  Social/Functional History  Lives With: Alone  Type of Home: House  Home Layout: One level  Home Access: Stairs to enter with rails  Entrance Stairs - Rails: Both  Bathroom Shower/Tub: Tub/Shower unit  Bathroom Equipment: Grab bars in shower, Shower chair  Home Equipment: Rolling walker, Cane  ADL Assistance: Independent  Homemaking Assistance: Independent  Homemaking Responsibilities: Yes  Ambulation Assistance: Independent  Transfer Assistance: Independent  Active : No  Additional Comments: Son stops over 2 times daily    Spoke with patients son and explained role in the team. Patients son questions answered appropriately. Explained discharge process. Patients son stated understanding. Per son, patient lives alone, son stops over daily. Patient was independent with ADLs and iADLs prior to admission. Plan is for patient to discharge home alone, but son stated that he would be able to spend more time over there if needed, and that he has asked the patient to come home with him, but she refuses. Patient has a ww and cane at home.  Electronically signed by El Taveras RN on 2/22/2021 at 11:30 AM

## 2021-02-22 NOTE — PROGRESS NOTES
Physical Therapy  Facility/Department: [facility] MED SURG [id number]  Physical Therapy Discharge      NAME: Jessi Golden    : 1932 (80 y.o.)  MRN: [id number]    Account: [de-identified]  Gender: female      Patient has been discharged from acute care hospital. DC patient from current PT program.      Electronically signed by Jhonatan Tenorio PT on 21 at 5:04 PM EST

## 2021-02-22 NOTE — PROGRESS NOTES
addressed:  Principal Problem:    Impaired mobility and activities of daily living due to CHF exac, [facility] re-admit 2/19/2021  Active Problems:    Paroxysmal atrial fibrillation (HCC)    Lumbar stenosis with neurogenic claudication    Osteoporosis    Charcot Sabiha Slough Tooth muscular atrophy    Myelopathy (HCC)    Dizziness    Essential hypertension    Pleural effusion    Impaired mobility    Acute cystitis with hematuria  Resolved Problems:    * No resolved hospital problems. *      ** Total time spent reviewing medical records, evaluating patient, speaking with RN's and consultants where I was focused exclusively on this patient: 35 minutes. This time is excluding time spent performing procedures or significant events occurring earlier or later in the day requiring my attention and focus. Subjective:   Admit Date: 2/19/2021  PCP: China Duncan MD    No acute events overnight. Afebrile Telemetry reviewed. No new complaints. Pt denies chest pain, SOB, N/V, fevers or chills. Objective:     Vitals:    02/22/21 0453 02/22/21 0724 02/22/21 0906 02/22/21 0948   BP:  102/67     Pulse:  60 86    Resp:  17     Temp:  98 °F (36.7 °C)     TempSrc:  Oral     SpO2: 94% 91%  94%   Weight:       Height:         General appearance: No acute distress, A+ O x 1-2. No  conversational dyspnea noted. Dentition intact. Answers questions appropriately  Lungs:  Diminished,  no exp wheezes, No rales No retractions; No use of accessory muscles  Heart:  S1, S2 normal, RRR, no MRG appreciated  Abdomen: (+) BS, soft, non-tender; non distended no guarding or rigidity. Extremities:  no cyanosis, trace edema bilat lower exts, no calf tenderness bilaterally.  Dry skin noted       Medications:      sulfamethoxazole-trimethoprim  1 tablet Oral BID WC    lactobacillus acidophilus  2 tablet Oral TID    sodium chloride flush  10 mL Intravenous 2 times per day    aspirin  81 mg Oral Daily    budesonide-formoterol  2 puff Inhalation BID    carvedilol  3.125 mg Oral BID    citalopram  20 mg Oral Daily    digoxin  125 mcg Oral Daily    furosemide  20 mg Oral Daily    levothyroxine  88 mcg Oral Daily    midodrine  5 mg Oral TID WC    pantoprazole  40 mg Oral BID AC    potassium chloride  20 mEq Oral Daily with breakfast    rivaroxaban  15 mg Oral Daily    vitamin B-12  1,000 mcg Oral Daily       LABS Reviewed    IMAGING Reviewed    Jacquie Duval CNP  Rounding Hospitalist

## 2021-02-22 NOTE — PLAN OF CARE
Problem: Falls - Risk of:  Goal: Will remain free from falls  Description: Will remain free from falls  2/22/2021 0150 by Shantal Alfredo RN  Outcome: Ongoing  2/21/2021 1415 by Pa Burton RN  Outcome: Ongoing  Goal: Absence of physical injury  Description: Absence of physical injury  2/22/2021 0150 by Shantal Alfredo RN  Outcome: Ongoing  2/21/2021 1415 by Pa Burton RN  Outcome: Ongoing     Problem: Mobility - Impaired:  Goal: Mobility will improve  Description: Mobility will improve  2/22/2021 0150 by Shantal Alfredo RN  Outcome: Ongoing  2/21/2021 1415 by Pa Burton RN  Outcome: Ongoing     Problem: Skin Integrity:  Goal: Will show no infection signs and symptoms  Description: Will show no infection signs and symptoms  2/22/2021 0150 by Shantal Alfredo RN  Outcome: Ongoing  2/21/2021 1415 by Pa Burton RN  Outcome: Ongoing  Goal: Absence of new skin breakdown  Description: Absence of new skin breakdown  2/22/2021 0150 by Shantal Alfredo RN  Outcome: Ongoing  2/21/2021 1415 by Pa Burton RN  Outcome: Ongoing

## 2021-02-22 NOTE — PROGRESS NOTES
Dr. Dre Kaba called to have pt NPO after midnight tonight for Left Pleurx drain placed tomorrow. Explained to pt w/son present. All questions were answered at this time.  Electronically signed by Danny Arcos RN on 2/22/2021 at 5:32 PM

## 2021-02-22 NOTE — PROGRESS NOTES
understand. PLAN OF CARE/Safety: ongoing. Mildly impulsive but follows simple commands. Therapy Time:   Individual   Time In 0910   Time Out 1000   Minutes 50   10 minute delay due to medication administration.   Minutes:50      Transfer/Bed mobility training:15      Gait trainin      Neuro re education:15     Therapeutic ex:0      Lucas Donohue PTA, 21 at 9:58 AM

## 2021-02-22 NOTE — CONSULTS
CC: SOB      Patient is a 80 y.o. female who presents with a chief complaint of SOB. Patient is followed on a regular basis by Dr. Amanda Issa MD. Patient with past medical history of SVT status post ablation, descending thoracic aorta aneurysm measuring 4.9 cm, ascending aorta aneurysm measuring 4.3 cm, paroxysmal atrial fibrillation,History of provoked DVT, originally presented to [facility] with significant shortness of breath as well as rapid heartbeat and chest pain. Patient was noted to be in acute decompensated combined heart failure. She was started on IV diuretics. She was also developed paroxysmal atrial fibrillation started on oral anticoagulation. She underwent cardiac catheterization for elevated troponins as well as chest pain and a presentation of acute decompensated heart failure and ejection fraction of 40% was noted to have moderate mid LAD stenosis with negative IFR. Patient underwent left pleural effusion thoracentesis x2 by interventional radiology. She was seen by pulmonary as well. She was transferred to rehab previously and developed worsening shortness of breath as well as O2 desaturation and transferred to [facility]. Patient was noted to have urinary retention and a Zeng was placed and urology was consulted. Patient also continues to have left pleural effusion and thoracic surgery is consulted for possible Pleurx catheter. She is currently seen in rehab and doing okay. She denies any chest discomfort.   She has low blood pressure and some blood pressure medications are on hold.         Past Medical History        Past Medical History:   Diagnosis Date    Ascending aortic aneurysm ([facility] [address] 6/23/2017    Charcot Arleen Tooth muscular atrophy      Depression      [facility]2017    Essential hypertension 2/16/2018    Headache      HTN (hypertension)      Impaired mobility and activities of daily living      Lumbar stenosis with neurogenic claudication      Myelopathy ([facility] [address] [facility]      Osteoarthritis               Patient Active Problem List   Diagnosis    Lumbar stenosis with neurogenic claudication    Acquired scoliosis    Osteoporosis    Charcot Arleen Tooth muscular atrophy    Excess weight    Osteoarthritis of lumbar spine with myelopathy    Osteoarthritis    Myelopathy ([facility] [address] [facility]    Impaired mobility and activities of daily living due to CHF gutierrez, [facility] admit 2021    Headache    Depression    Ascending aortic aneurysm (HCC)    Descending aortic aneurysm (HCC)    Dizziness    Shortness of breath    Essential hypertension    Acute on chronic combined systolic and diastolic CHF (congestive heart failure) (HCC)    Gait abnormality    Paroxysmal atrial fibrillation (HCC)    Pleural effusion    Hypoxia    Acute pulmonary edema (HCC)    Acute on chronic combined systolic (congestive) and diastolic (congestive) heart failure (HCC)         Past Surgical History         Past Surgical History:   Procedure Laterality Date    JOINT REPLACEMENT        THORACENTESIS Left 01/29/2021     total of 755 cc removed per Dr Lord Benitez specimen sent to lab            Social History   Social History            Socioeconomic History    Marital status:        Spouse name: Not on file    Number of children: Not on file    Years of education: Not on file    Highest education level: Not on file   Occupational History    Not on file   Social Needs    Financial resource strain: Not on file    Food insecurity       Worry: Not on file       Inability: Not on file    Transportation needs       Medical: Not on file       Non-medical: Not on file   Tobacco Use    Smoking status: Never Smoker    Smokeless tobacco: Never Used   Substance and Sexual Activity    Alcohol use:  No       Alcohol/week: 0.0 standard drinks    Drug use: No    Sexual activity: Not on file   Lifestyle    Physical activity       Days per week: 0 days       Minutes per session: 0 min    Stress: Only a little   Relationships    Social connections       Talks on phone: More than three times a week       Gets together: More than three times a week       Attends Nondenominational service: 1 to 4 times per year       Active member of club or organization: No       Attends meetings of clubs or organizations: Never       Relationship status:     Intimate partner violence       Fear of current or ex partner: No       Emotionally abused: No       Physically abused: No       Forced sexual activity: No   Other Topics Concern    Not on file   Social History Narrative     Lives With: Alone, son lives down the street, dtr is in the area     Has a son in the area     Type of Home: [address] in [address] [place] [address]: One level     Home Access: Stairs to enter with rails- Number of Steps: 2- Rails: Both     Bathroom Shower/Tub: Tub/Shower unit, Bathroom Equipment: Grab bars in shower, Shower chair     Home Equipment: Rolling walker, Cane(Pt infrequemtly uses DME for ambulation and prefers to furniture walk in home)     ADL Assistance: Independent, [address]: [address] [place] [address] Responsibilities:  Yes     Ambulation Assistance: Independent, Transfer Assistance: Independent     Additional Comments: Son stops over 2 times daily                  Family History         Family History   Problem Relation Age of Onset    Arthritis Mother      Arthritis Father      High Blood Pressure Father              Current Facility-Administered Medications             Current Facility-Administered Medications   Medication Dose Route Frequency Provider Last Rate Last Admin    citalopram (CELEXA) tablet 20 mg  20 mg Oral Daily Eluterio Merlin, MD        levothyroxine (SYNTHROID) tablet 88 mcg  88 mcg Oral Daily Eluterio Merlin, MD   88 mcg at 02/10/21 6053    aspirin chewable tablet 81 mg  81 mg Oral Daily Eluterio Merlin, MD        vitamin B-12 (CYANOCOBALAMIN) tablet 1,000 mcg  1,000 mcg Oral Daily Deanna Davenport MD        [facility]) extended release capsule 120 mg  120 mg Oral BID Deanna Davenport MD        sodium chloride flush 0.9 % injection 10 mL  10 mL Intravenous 2 times per day Deanna Davenport MD   10 mL at 02/09/21 2130    sodium chloride flush 0.9 % injection 10 mL  10 mL Intravenous PRN Deanna Davenport MD        [facility]) tablet 12.5 mg  12.5 mg Oral Q6H PRN Deanna Davenport MD         Or    ondansetron [facility]) injection 4 mg  4 mg Intravenous Q6H PRN Deanna Davenport MD        polyethylene glycol [facility]) packet 17 g  17 g Oral Daily RAEN Deanna Davenport MD        acetaminophen (TYLENOL) tablet 650 mg  650 mg Oral Q6H PRN Deanna Davenport MD         Or    acetaminophen (TYLENOL) suppository 650 mg  650 mg Rectal Q6H OSMANY Davenport MD        enoxaparin (LOVENOX) injection 40 mg  40 mg Subcutaneous Daily Deanna Davenport MD        furosemide (LASIX) injection 20 mg  20 mg Intravenous BID Deanna Davenport MD                ALLERGIES: Latex and Tape [employer] tape]     Review of Systems   Constitutional: Negative. Negative for chills and fever. HENT: Negative. Eyes: Negative. Respiratory: Positive for shortness of breath. Negative for wheezing. Cardiovascular: Positive for chest pain. Negative for palpitations and leg swelling. Gastrointestinal: Negative. Negative for abdominal pain, nausea and vomiting. Endocrine: Negative. Genitourinary: Negative. Musculoskeletal: Negative. Skin: Negative. Negative for rash. Allergic/Immunologic: Negative. Neurological: Negative for dizziness, weakness and headaches. Hematological: Negative. Psychiatric/Behavioral: Negative.             VITALS:  Blood pressure 117/71, pulse 69, temperature 98.2 °F (36.8 °C), weight 163 lb 12.8 oz (74.3 kg), SpO2 96 %.   Body mass index is 29.02 kg/m².     Physical Exam   Constitutional: She is oriented to person, place, and time. She appears well-developed and well-nourished. HENT:   Head: Normocephalic and atraumatic. Eyes: Pupils are equal, round, and reactive to light. Neck: Normal range of motion. Neck supple. No JVD present. No tracheal deviation present. No thyromegaly present. Cardiovascular: Normal rate, regular rhythm, normal heart sounds and intact distal pulses. PMI is not displaced. Exam reveals no gallop, no S3, no distant heart sounds and no friction rub. No murmur heard. Pulmonary/Chest: No respiratory distress. She has no wheezes. She has no rales. She exhibits no tenderness. Abdominal: Soft. Bowel sounds are normal. She exhibits no distension and no mass. There is no abdominal tenderness. There is no rebound and no guarding. Musculoskeletal:         General: No edema. Neurological: She is alert and oriented to person, place, and time. No cranial nerve deficit. Skin: Skin is warm and dry. No rash noted. She is not diaphoretic. No erythema. No pallor. Psychiatric: She has a normal mood and affect.  Her behavior is normal. Judgment and thought content normal.         LABS:  Recent Results         Recent Results (from the past 24 hour(s))   COVID-19     Collection Time: 02/09/21  3:33 PM   Result Value Ref Range     SARS-CoV-2, NAAT Not Detected Not Detected   EKG 12 Lead     Collection Time: 02/10/21 12:51 AM   Result Value Ref Range     Ventricular Rate 70 BPM     Atrial Rate 70 BPM     P-R Interval 204 ms     QRS Duration 150 ms     Q-T Interval 466 ms     QTc Calculation (Bazett) 503 ms     P Axis 59 degrees     R Axis -61 degrees     T Axis -59 degrees   Basic Metabolic Panel     Collection Time: 02/10/21  5:47 AM   Result Value Ref Range     Sodium 137 135 - 144 mEq/L     Potassium 4.0 3.4 - 4.9 mEq/L     Chloride 99 95 - 107 mEq/L     CO2 27 20 - 31 mEq/L     Anion Gap 11 9 - 15 mEq/L     Glucose 134 (H) 70 - 99 mg/dL     BUN 15 8 - 23 mg/dL     CREATININE 0.81 0.50 - 0.90 mg/dL     GFR Non- >60.0 >60     GFR  >60.0 >60     Calcium 8.5 8.5 - 9.9 mg/dL   Magnesium     Collection Time: 02/10/21  5:47 AM   Result Value Ref Range     Magnesium 1.7 1.7 - 2.4 mg/dL   Lipid panel - fasting     Collection Time: 02/10/21  5:47 AM   Result Value Ref Range     Cholesterol, Total 123 0 - 199 mg/dL     Triglycerides 78 0 - 150 mg/dL     HDL 39 (L) 40 - 59 mg/dL     LDL Calculated 68 0 - 129 mg/dL         Troponin:         Lab Results   Component Value Date     TROPONINI <0.010 01/26/2021         EKG: normal sinus rhythm, nonspecific ST and T waves changes      ASSESSMENT:      Acute on chronic decompensated combined congestive heart failure. -Improved  NICMP EF of 40%. Paroxysmal atrial fibrillation-sinus rhythm today. History of coronary disease- Mod LAD disease, negative IFR. History of SVT status post ablation    Essential hypertension   Recurrent left pleural effusion status post repeat thoracentesis. Ascending thoracic aorta measuring around 5.0cm per CTA chest 1/25/21--similar to CTA chest 3/15/19  Descending thoracic aortic aneurysm measuring up to 4.8cm per CTA chest 1/25/21--similar to prior CTA chest 3/15/19  Urinary retention  Hypotension       PLAN:   1. As always, aggressive risk factor modification is strongly recommended. We should adhere to the JNC VIII guidelines for HTN management and the NCEPATP III guidelines for LDL-C management. 2. Maintain taylor./Urology recommendation  3. PO lasix given low BP monitor I's and O's and renal function. 4. Max cardiac meds-holding ARB, CCB due to hypotension.  Continue with Coreg 3.125 mg twice daily. 5. Continue with p.o. digoxin given history of paroxysmal A. fib with RVR and hypotension. 6. Continue with Xarelto 15 mg daily. 7. Check labs periodically  8. Pulmonary and thoracic surgery recommendations  9. CHF teaching and follow up in CHF clinic post discharge  10. Low sodium diet.    11. Urology evaluation/recs regarding urinary retention. Thank you for allowing me to participate in the care of your patient, please don't hesitate to contact me if you have any further questions.       Electronically signed by Henri Blair DO on 2/22/2021 at 4:26 PM

## 2021-02-22 NOTE — CARE COORDINATION
[address] [place] [address] NOTE  [facility]  Admit Date: 2021       Date: 2021  Patient Name: Annteta Morrow        MRN: [id number]    : 1932  (80 y.o.)  Gender: female        REHAB DIAGNOSIS:   Diagnosis: Impaired mobility & ADL's d/t progressive toxic encephalopathy 2* CHF & COPD exacerbation 2021    CO MORBIDITIES:  Pleural effusions      Past Medical History:   Diagnosis Date    Ascending [facility] ([facility] 2017    Charcot Arleen Tooth muscular atrophy     Depression     Descending aortic aneurysm ([facility] [address] [facility] 2017    Essential hypertension 2018    Headache     HTN (hypertension)     Impaired mobility and activities of daily living     Lumbar stenosis with neurogenic claudication     Myelopathy ([facility] [address] [facility]     Osteoarthritis      Past Surgical History:   Procedure Laterality Date    JOINT REPLACEMENT      THORACENTESIS Left 2021    total of 755 cc removed per Dr Ibis Vale specimen sent to lab     Chart Reviewed: Yes  Family / Caregiver Present: No  General Comment  Comments: pt agravated that she is in hospital clothes  Restrictions  Restrictions/Precautions: Fall Risk  CASE MANAGEMENT    Social/Functional History  Social/Functional History  Lives With: Alone  Type of Home: House  Home Layout: One level  Home Access: Stairs to enter with rails  Entrance Stairs - Rails: Both  Bathroom Shower/Tub: Tub/Shower unit  Bathroom Equipment: Grab bars in shower, Shower chair  Home Equipment: Rolling walker, Cane  ADL Assistance: Independent  Homemaking Assistance: Independent  Homemaking Responsibilities: Yes  Ambulation Assistance: Independent  Transfer Assistance: Independent  Active : No  Additional Comments: Son stops over 2 times daily       Pts personal preferences: n/a    Pts assets/resources/support system: son and dtr    COVERAGE INFORMATION:Payor: MEDICARE / Plan: MEDICARE PART A AND B / Product Type: *No Product type* /       NURSING  Weight: 157 lb (71.2 kg) / Body mass index is 27.81 kg/m². DIET LOW SODIUM 2 GM;  Diet NPO Effective Now Exceptions are: Sips of Water with Meds    SpO2: 94 % (02/22/21 0948)  O2 Flow Rate (L/min): 3 L/min (02/22/21 0948)  No active isolations    Skin Issues: No    Pain Managed: Yes    Bladder continence: Zeng, reason: retention    Bowel continence: Yes      Other: going for L chest drain on 2/23 with [facility]      PHYSICAL THERAPY  Bed mobility:  Supine to Sit: Stand by assistance (02/20/21 1433)  Sit to Supine: Stand by assistance (02/20/21 1433)  Transfers:  Sit to Stand: Contact guard assistance (02/22/21 0949)  Bed to Chair: Stand by assistance (02/20/21 1434)  Gait:   Device: Rolling Walker (02/22/21 0955)  Other Apparatus: O2 (02/22/21 0955)  Assistance: Contact guard assistance (02/22/21 0955)  Distance: 80 feet (02/22/21 0955)  Quality of Gait: steppage gt due to bilateral drop foot, downward gaze with increased trunk flexion (02/22/21 0955)  Comments: 94% pre ambulation (02/22/21 0955)  Stairs:  # Steps : 4 (02/22/21 0955)  Rails: Bilateral (02/22/21 0955)  Assistance: Contact guard assistance (02/22/21 0955)  Comment: very sob after steps, c/o chest pain. RN notified. sats to 96% on 3L02 (02/22/21 0955)  W/C mobility:     LTG:  Long term goal 1: Pt will perform bed mobility with indep  Long term goal 2: Pt will perform functional transfers with indep  Long term goal 3: Pt will ambulate >/=50ft with 2ww and mod I  Long term goal 4: Pt will negotiate 4 steps with handrail and SBA  Long term goal 5: Pt will perform >/= 23/56 for Bernard balance  PT Treatment Time:  1.5 hrs      OCCUPATIONAL THERAPY  Hand Dominance: Right  ADL  Feeding: Unable to assess(comment) (02/22/21 1148)  Grooming: Setup (02/22/21 1148)  UE Bathing: Setup;Supervision (02/22/21 1148)  LE Bathing:  Moderate assistance (02/22/21 1148)  UE Dressing: Stand by assistance (02/22/21 1148)  LE Dressing: Maximum assistance (02/22/21 1148)  Toileting: Unable to assess(comment)(did not need to go) (02/22/21 1148)  Additional Comments: pt completed shower ADL at the above level. (02/22/21 1148)  Toilet Transfers  Toilet - Technique: Stand pivot (02/20/21 3052)  Equipment Used: Grab bars (02/20/21 0858)  Toilet Transfer: Unable to assess (02/22/21 1151)  Toilet Transfers Comments: did not need to go (02/22/21 1151)  Tub Transfers  Tub Transfers: Not tested (02/20/21 0092)  Shower Transfers  Shower - Transfer From: Wheelchair (02/22/21 1151)  Shower - Transfer Type: To and From (02/22/21 1151)  Shower - Transfer To:  Shower seat with back (02/22/21 1151)  Shower - Technique: Stand pivot (02/22/21 1151)  Shower Transfers: Minimal assistance (02/22/21 1151)  Shower Transfers Comments: Pt completed sponge bath ADL (02/20/21 0858)  LTG:  Eating  Assistance Needed: Setup or clean-up assistance  Comment: food cut, packages open  CARE Score: 5  Discharge Goal: Set-up or clean-up assistance, Oral Hygiene  Assistance Needed: Setup or clean-up assistance  CARE Score: 5  Discharge Goal: Set-up or clean-up assistance, Toileting Hygiene  Assistance Needed: Partial/moderate assistance  CARE Score: 3  Discharge Goal: Supervision or touching assistance, Shower/Bathe Self  Assistance Needed: Partial/moderate assistance  CARE Score: 3  Discharge Goal: Supervision or touching assistance  Upper Body Dressing  Assistance Needed: Supervision or touching assistance  CARE Score: 4  Discharge Goal: Set-up or clean-up assistance, Lower Body Dressing  Assistance Needed: Partial/moderate assistance  CARE Score: 3  Discharge Goal: Supervision or touching assistance, Putting On/Taking Off Footwear  Assistance Needed: Substantial/maximal assistance  CARE Score: 2  Discharge Goal: Partial/moderate assistance, Toilet Transfer  Assistance Needed: Partial/moderate assistance  CARE Score: 3  Discharge Goal: Supervision or touching assistance  OT Treatment Time: 1.5 hrs      SPEECH THERAPY                 Diet/Swallow:                       LTG:                COGNITION  OT: Cognition Comment: comp: Sup express: TERENCE soc int: TERENCE prob solv: Lorri mem: Lorri  SP:        RECREATIONAL THERAPY  Attendance to recreational therapy programs:    []  Pet Therapy  [] Music Therapy  [] Art Therapy    [] Recreation Therapy Group [] Support Group           Patient social interaction (mood, participation): good      Patient strengths: was independent prior    Patients goal: to go home    Problems/Barriers: lives alone        1. Safety:          - Intervention / Plan:    [x]  falls protocol     [x]  PT/OT    [x]  SP        - Results:         2. Potential DME needs:         - Intervention / Plan:  [x]  PT/OT     [x]  Assess equipment needs/access       - Results:         3. Weakness:          - Intervention / Plan:  [x]  PT/OT      []  Other:         - Results:         4. Discharge planning needs:          - Intervention / Plan:  [x]  Weekly team conference      []  family training        - Results:         5.            - Intervention / Plan:          - Results:         6.            - Intervention / Plan:         - Results:         7.            - Intervention / Plan:         - Results:           Discharge Plan   Estimated Length of Stay: 11 days    Tentative Discharge date:  3/2/21      Anticipated Discharge Destination:  Home      Team recommendations:    1. Follow up Therapy :    PT  OT  RN  [place] Aide    2. Home Health    Other:     Equipment needed at Discharge:  Other: TBD      Team Members Present at Conference:    Physician: Dr. Laith Puga  : Phoebe Corral, RN  RN: Landen Carranza RN  Physical Therapist: Karis Villegas, PT  Occupational [id number] [employer]  Speech Therapist: Lorenza Colon SLP  Nurse Manager: Biju Castro RN    Electronically signed by Jane Oropeza RN on 2/22/2021 at 2:47 PM

## 2021-02-22 NOTE — PROGRESS NOTES
INDIVIDUALIZED OVERALL REHAB PLAN OF CARE  ADDENDUM TO REHAB PROGRESS NOTE-for audit purposes must also refer to this day's clinical note and combine the information      Date: 2021  Patient Name: Annetta Morrow   Room: E410/A658-71    MRN: [id number]    : 1932  (80 y.o.)  Gender: female       Today 2021 during weekly team meeting, I reviewed the patient Annetta Morrow in detail with the therapists and nurses involved in patient's care gathering complex physiatric data regarding current medical issues, progress in therapies, factors limiting progress, social issues, psychological issues, ongoing therapeutic plans and discharge planning. Legend:  I= independent Im =Modified independent  S=Supervised SB=stand by MAGANA=set up CG=contact fran Min= minimal Mod=Moderate Max=maximal Max of 2 =maximal assist of 2 people      CURRENT FUNCTIONAL STATUS:    NURSING ISSUES:         Nursing will continue to focus on bowel and bladder continence transitioning toward independence by time of discharge. Monitoring post void residuals monitoring for severe constipation and bowel obstruction. Focus on achieving ADL goals with co-treating with OT when possible.     PHYSICAL THERAPY  Bed mobility:  Supine to Sit: Stand by assistance (21 1433)  Sit to Supine: Stand by assistance (21 1433)  Transfers:  Sit to Stand: Stand by assistance;Contact guard assistance (21 1347)  Bed to Chair: Stand by assistance (21 1434)  Gait:   Device: Rolling Walker (21 134)  Other Apparatus: O2 (21 134)  Assistance: Contact guard assistance (21 1348)  Distance: 80 feet (21 0955)  Quality of Gait: steppage gt due to bilateral drop foot, downward gaze with increased trunk flexion (21 134)  Comments: 94% pre ambulation (21 0955)  Stairs:  # Steps : 4 (21 134)  Rails: Bilateral (21 1348)  Assistance: Contact guard assistance (21 134)  Comment: Stairs completed without reports of chest pain SpO2 95% post stairs (02/22/21 1348)  W/C mobility:         OCCUPATIONAL THERAPY  Hand Dominance: Right  ADL  Feeding: Unable to assess(comment) (02/22/21 1148)  Grooming: Setup (02/22/21 1148)  UE Bathing: Setup;Supervision (02/22/21 1148)  LE Bathing: Moderate assistance (02/22/21 1148)  UE Dressing: Stand by assistance (02/22/21 1148)  LE Dressing: Maximum assistance (02/22/21 1148)  Toileting: Unable to assess(comment)(did not need to go) (02/22/21 1148)  Additional Comments: pt completed shower ADL at the above level. (02/22/21 1148)  Toilet Transfers  Toilet - Technique: Stand pivot (02/20/21 5433)  Equipment Used: Grab bars (02/20/21 0858)  Toilet Transfer: Unable to assess (02/22/21 1151)  Toilet Transfers Comments: did not need to go (02/22/21 1151)  Tub Transfers  Tub Transfers: Not tested (02/20/21 4188)  Shower Transfers  Shower - Transfer From: Wheelchair (02/22/21 1151)  Shower - Transfer Type: To and From (02/22/21 1151)  Shower - Transfer To: Shower seat with back (02/22/21 1151)  Shower - Technique: Stand pivot (02/22/21 1151)  Shower Transfers: Minimal assistance (02/22/21 1151)  Shower Transfers Comments: Pt completed sponge bath ADL (02/20/21 0858)      SPEECH THERAPY               Diet/Swallow:                           COGNITION  OT: Cognition Comment: comp: Sup express: TERENCE soc int: TERENCE prob solv: Lorri mem: Lorri  SP:            THERAPY, MEDICAL AND NURSING COORDINATION:    [x]  Pain medication before therapies     []  Check orthostatic BP      [x]  Ambulate to the bathroom in room    [x]  Add scheduled rest beaks     []  In room therapies      Discharge date set for:              3/2 /21      Home with:   alone  with help from   Son and dtr            And:      Home Health Care:     [x]  PT    [x]  OT    []  ST   [x]  Aide   []  SW    [x]  RN                    Outpatient Therapy:  []  PT    []  OT    []  ST   []  Rehab Psych                 Equipment: WW      At D/C their function is goaled at:   PT:Long term goal 1: Pt will perform bed mobility with indep  Long term goal 2: Pt will perform functional transfers with indep  Long term goal 3: Pt will ambulate >/=50ft with 2ww and mod I  Long term goal 4: Pt will negotiate 4 steps with handrail and SBA  Long term goal 5: Pt will perform >/= 23/56 for Bernard balance  OT:Eating  Assistance Needed: Setup or clean-up assistance  Comment: food cut, packages open  CARE Score: 5  Discharge Goal: Set-up or clean-up assistance, Oral Hygiene  Assistance Needed: Setup or clean-up assistance  CARE Score: 5  Discharge Goal: Set-up or clean-up assistance, Toileting Hygiene  Assistance Needed: Partial/moderate assistance  CARE Score: 3  Discharge Goal: Supervision or touching assistance, Shower/Bathe Self  Assistance Needed: Partial/moderate assistance  CARE Score: 3  Discharge Goal: Supervision or touching assistance  Upper Body Dressing  Assistance Needed: Supervision or touching assistance  CARE Score: 4  Discharge Goal: Set-up or clean-up assistance, Lower Body Dressing  Assistance Needed: Partial/moderate assistance  CARE Score: 3  Discharge Goal: Supervision or touching assistance, Putting On/Taking Off Footwear  Assistance Needed: Substantial/maximal assistance  CARE Score: 2  Discharge Goal: Partial/moderate assistance, Toilet Transfer  Assistance Needed: Partial/moderate assistance  CARE Score: 3  Discharge Goal: Supervision or touching assistance  SP:               From a cognitive standpoint they will need:        24 hr supervision  --progress to occasional           Significant problems/ barriers to functional progress include: Pt is at a high risk for functional loss,    [x]  Acute infection/UTI    []  Low BP's     []  COPD flare-up   []  Uncontrolled blood sugar     []  Progressive anemia         [x]  Severe pain exacerbation     []  Impaired mental status    []  Urinary incontinence    []  Bowel incontinence Plan to correct barriers to functional progress: Add scheduled rest breaks, control pain by using ice Lidoderm rest and massage as well as pain medications prior to therapy. Based on a comprehensive evaluation of the above, the individualized therapy and Discharge plan will be:    -Times stated are an average that will be varied based on the patient's daily need. PT  1 1/2  hrs/day 5-7 days per week           OT  1 1/2hrs per day 5-7 days per week         Estimated LOS 2 week(s)    - Overall functional prognosis:     [x]  Good    []  Fair    []  Poor -Medical Prognosis:   [x]  Good    []  Fair    []  Poor    This patient was made aware of the discussion of Plan of Care, their projected dicharge date and their projected function at discharge.        Bhavik De La Cruz, DO

## 2021-02-22 NOTE — PROGRESS NOTES
Occupational Therapy  Fac[facility]/Department: [facility]  Daily Treatment Note  NAME: Junie Balderrama  : 1932  MRN: [id number]    Date of Service: 2021    Discharge Recommendations:  Continue to assess pending progress       Assessment      Activity Tolerance  Activity Tolerance: Patient Tolerated treatment well  Activity Tolerance: 3L O2  Safety Devices  Safety Devices in place: Yes  Type of devices: All fall risk precautions in place         Patient Diagnosis(es): There were no encounter diagnoses. has a past medical history of Ascending aortic aneurysm ([address] Charcot Arleen Tooth muscular atrophy, Depression, Descending aortic aneurysm ([address] Essential hypertension, Headache, HTN (hypertension), Impaired mobility and activities of daily living, Lumbar stenosis with neurogenic claudication, Myelopathy ([address] and Osteoarthritis. has a past surgical history that includes joint replacement and thoracentesis (Left, 2021). Restrictions  Restrictions/Precautions  Restrictions/Precautions: Fall Risk  Subjective   General  Chart Reviewed: Yes  Patient assessed for rehabilitation services?: Yes  Family / Caregiver Present: No  Diagnosis: Impaired mobility and ADLs d/t CHF exacerbation  Subjective  Subjective: \"I'm okay. \"  Pre Treatment Pain Screening  Pain at present: 0  Scale Used: Numeric Score  Intervention List: Patient able to continue with treatment  Comments / Details: Pt denies pain states it feels hard to take a deep breath. Omar Hussein RN and pts son in room  Vital Signs  Patient Currently in Pain: Denies   Orientation     Objective    Pt son and RN Omar Hussein in room at beginning of session. Pt completed self drinking task at beginning of session. Pt completed sit <> stand with cues for correct technique. Pt required BUE support to maintain balance while RN adjusted catheter. Pt demonstrated stand tolerance of aprox 45-60 sec before returning to sit wc level.  Pt required total assist to ramila shoes. Pt agreeable to OT in therapy gym. Pt completed BUE strengthening task placing various size rubber washers on corresponding dowels with verbal cues to initiate task correctly. ADL  Feeding: Stand by assistance(self drinking using 2 handed technique to bring cup to mouth and use of straw to complete)              Plan   Plan  Times per week: 5-7x week  Plan weeks: 1-2  Current Treatment Recommendations: Strengthening, Safety Education & Training, Balance Training, Patient/Caregiver Education & Training, Self-Care / ADL, Functional Mobility Training, Neuromuscular Re-education, Endurance Training  Plan Comment: Continue POC    Goals  Patient Goals   Patient goals :  To go home       Therapy Time   Individual Concurrent Group Co-treatment   Time In 1300         Time Out 1330         Minutes 30              Therapeutic activities: 30 minutes    Cole Cotton OT    Electronically signed by Cole Cotton OT on 2/22/2021 at 4:20 PM

## 2021-02-22 NOTE — PROGRESS NOTES
Physical Therapy Rehab Treatment Note  [facility]  [facility]       NAME: Donnamaria Kanner  : 1932 (80 y.o.)  MRN: [id number]  CODE STATUS: Full Code    Date of Service: 2021  Chart Reviewed: Yes  Patient assessed for rehabilitation services?: Yes  Family / Caregiver Present: No  Diagnosis: Impaired mobility & ADL's d/t progressive toxic encephalopathy 2* CHF & COPD exacerbation 2021    Restrictions:  Restrictions/Precautions: Fall Risk       SUBJECTIVE: Subjective: i am doing okay  Response To Previous Treatment: Patient with no complaints from previous session. Pain Screening  Patient Currently in Pain: Denies  Pre Treatment Pain Screening  Pain at present: 0  Scale Used: Numeric Score  Intervention List: Patient able to continue with treatment    Post Treatment Pain Screening:  Pain Assessment  Pain Assessment: 0-10  Pain Level: 0    OBJECTIVE:   Follows Commands: Impaired    Transfers  Sit to Stand: Stand by assistance;Contact guard assistance  Stand to sit: Stand by assistance;Contact guard assistance    Ambulation  Ambulation?: Yes  More Ambulation?: No  Ambulation 1  Surface: carpet  Device: Rolling Walker  Other Apparatus: O2  Assistance: Contact guard assistance  Quality of Gait: steppage gt due to bilateral drop foot, downward gaze with increased trunk flexion    Stairs/Curb  Stairs?: Yes  Stairs  # Steps : 4  Stairs Height: 6\"  Rails: Bilateral  Device: No Device  Assistance: Contact guard assistance  Comment: Stairs completed without reports of chest pain SpO2 95% post stairs     Exercises  Hamstring Sets: x20 YTB  Hip Flexion: x 20  Hip Abduction: YTB /Ball x 20  Knee Long Arc Quad: x 20  Ankle Pumps: PF x 20     ASSESSMENT/COMMENTS:  Body structures, Functions, Activity limitations: Decreased functional mobility ; Decreased safe awareness;Decreased balance;Decreased ADL status; Decreased endurance;Decreased strength;Decreased posture  Assessment: Patient continues to show SOB and fatigue with gait and stair negotiation, Patient maintains 95%+ SpO2  throughout therapy session    PLAN OF CARE/Safety:   Safety Devices  Type of devices:  All fall risk precautions in place      Therapy Time:   Individual   Time In 1330   Time Out 1400   Minutes 30     Minutes:30      Gait training:15     Therapeutic ex: [address] JERRICA Burrows, 02/22/21 at 4:28 PM

## 2021-02-22 NOTE — PROGRESS NOTES
Spoke with patient and her daughter several days ago about placing a left Pleurx catheter under local IV sedation due to the patient's medical condition as well as cardiomegaly. Patient is added on the OR schedule for Tuesday, February 23. If all goes well this can be done as an outpatient procedure and she can immediately go back to her rehab.

## 2021-02-22 NOTE — PROGRESS NOTES
Occupational Therapy  Facility/Department: [facility]  Daily Treatment Note  NAME: Macey Smith  : 1932  MRN: [id number]    Date of Service: 2021    Discharge Recommendations:  Continue to assess pending progress       Assessment      Activity Tolerance  Activity Tolerance: Patient Tolerated treatment well  Activity Tolerance: 3L O2  Safety Devices  Safety Devices in place: Yes  Type of devices: All fall risk precautions in place         Patient Diagnosis(es): There were no encounter diagnoses. has a past medical history of Ascending aortic aneurysm ([facility] Charcot Arleen Tooth muscular atrophy, Depression, Descending aortic aneurysm ([facility] Essential hypertension, Headache, HTN (hypertension), Impaired mobility and activities of daily living, Lumbar stenosis with neurogenic claudication, Myelopathy ([facility] and Osteoarthritis. has a past surgical history that includes joint replacement and thoracentesis (Left, 2021). Restrictions  Restrictions/Precautions  Restrictions/Precautions: Fall Risk  Subjective   General  Chart Reviewed: Yes  Patient assessed for rehabilitation services?: Yes  Family / Caregiver Present: No  Diagnosis: Impaired mobility and ADLs d/t CHF exacerbation  Subjective  Subjective: \"I'm okay. \"  Pre Treatment Pain Screening  Pain at present: 0  Scale Used: Faces  Intervention List: Patient able to continue with treatment  Comments / Details: states she had pain earlier with exercise however denies pain at this time. Vital Signs  Patient Currently in Pain: Denies   Orientation     Objective    Pt completed shower ADL at the below level. Pt required increased time and intermittent RB to complete ADL tasks. Pt placed mushroom pegs on board repetitively reaching across table top level to improve BUE strength/endurance for functional tasks. Pt required intermittent rest breaks due to SOB to complete task.      ADL  Feeding: Unable to assess(comment)  Grooming: Setup  UE Bathing: Setup;Supervision  LE Bathing: Moderate assistance  UE Dressing: Stand by assistance  LE Dressing: Maximum assistance  Toileting: Unable to assess(comment)(did not need to go)  Additional Comments: pt completed shower ADL at the above level. Toilet Transfers  Toilet Transfer: Unable to assess  Toilet Transfers Comments: did not need to go  [address] - Transfer From: Wheelchair  Shower - Transfer Type: To and From  Shower - Transfer To: Shower seat with back  Shower - Technique: Stand pivot  Shower Transfers: Minimal assistance     Transfers  Sit to stand: Minimal assistance  Stand to sit: Minimal assistance                       Cognition  Cognition Comment: comp: Sup express: TERENCE soc int: TERENCE prob solv: Lorri mem: [address]  Times per week: 5-7x week  Plan weeks: 1-2  Current Treatment Recommendations: Strengthening, Safety Education & Training, Balance Training, Patient/Caregiver Education & Training, Self-Care / ADL, Functional Mobility Training, Neuromuscular Re-education, Endurance Training  Plan Comment: Continue POC       Goals  Patient Goals   Patient goals : To go home       Therapy Time   Individual Concurrent Group Co-treatment   Time In 1045         Time Out 1155         Minutes 70           ADL trainin minutes  Therapeutic activities: 15 minutes     Patient participated in above/following treatment session to complete previously scheduled minutes from this date.    Fransico Lux OT    Electronically signed by Fransico Lux OT on 2021 at 2:27 PM

## 2021-02-22 NOTE — PROGRESS NOTES
Subjective: The patient complains of  moderate to severe acute    CHF partially relieved by rest, PT, OT,   and exacerbated by recent illness and exertion. I am concerned about patients  frailty and bleeding risk on Xarelto. Her exacerbation of CHF seems to be improving on monitoring her blood pressure closely pulmonology and cardiology are involved she has diuresed and has a Taylor catheter in. She may require a VATS procedure sometime this week. Pleurx catheter planned for 2/23/2021. I used a translation application to help talk to her she states that she is dizzy when she stands up which is an old problem. She does have a low BP and is on midodrine I will add abdominal binder and teds. She has a bladder infection and is on Bactrim. I am concerned about her complaints of dysuria and frequency as well as lightheadedness when she stands up will scatter her medications consider continue midodrine and start treating bladder infection. She may benefit from a dose of IM Rocephin. ROS x10: The patient also complains of severely impaired mobility and activities of daily living. Otherwise no new problems with vision, hearing, nose, mouth, throat, dermal, cardiovascular, GI, , pulmonary, musculoskeletal, psychiatric or neurological. See Rehab H&P on Rehab chart dated . Vital signs:  /67   Pulse 60   Temp 98 °F (36.7 °C) (Oral)   Resp 17   Ht 5' 3\" (1.6 m)   Wt 157 lb (71.2 kg)   SpO2 91%   BMI 27.81 kg/m²   I/O:   PO/Intake:  fair PO intake, no problems observed or reported. Bowel/Bladder:   UTI Bactrim, constipation  ,taylor for retention  General:  Patient is well developed, adequately nourished, non-obese and     well kempt. HEENT:    PERRLA, hearing intact to loud voice, external inspection of ear     and nose benign. Inspection of lips, tongue and gums benign  Musculoskeletal: No significant change in strength or tone. All joints stable.       Inspection and palpation of digits and nails show no clubbing,       cyanosis or inflammatory conditions. Neuro/Psychiatric: Affect: flat. Alert and oriented to person, place and     situation. No significant change in deep tendon reflexes or     sensation  Lungs:  Diminished, CTA-B. Respiration effort is normal at rest.     Heart:   S1 = S2, RRR. No loud murmurs. Abdomen:  Soft, non-tender, no enlargement of liver or spleen. Extremities:  No significant lower extremity edema or tenderness. Skin:   Intact to general survey, no visualized or palpated problems. Rehabilitation:  Physical therapy: FIMS:  Bed Mobility: Scooting: Minimal assistance    Transfers: Sit to Stand: Stand by assistance  Stand to sit: Stand by assistance  Bed to Chair: Stand by assistance, Ambulation 1  Surface: level tile  Device: Rolling Walker  Other Apparatus: O2  Assistance: Contact guard assistance  Quality of Gait: flexed posture, downward gaze, SOB upon exertion. Exagerated swing phase R?L. Gait Deviations: Slow Lea, Increased JAMES  Distance: 80, 30  Comments: SP02 97% post ambulation, Stairs  # Steps : 4  Stairs Height: 6\"  Rails: Bilateral  Device: No Device  Assistance: Contact guard assistance  Comment: non reciprocal pattern, SOB post stair training, SpO2 95%    FIMS:  ,  , Assessment: Pt well motivated . SAO2 stayed between 96-98% on RA. Pt required brief rest breaks x 3 during session. VC to improve posture and step into walker. Occupational therapy: FIMS:   ,  , Assessment: Pt is 81 y/o female presenting with the above deficits, resulting in increased dependence for safe competion of self care ADLs. Pt limited by weakness/decreased endurance and poor  strength for fine motor areas of care. Pt would benefit from OT to improve on condition to maximize safety and independence with functional transfers, mobility an self care.     Speech therapy: FIMS:        Lab/X-ray studies reviewed, analyzed and discussed with patient and staff:   Recent Results (from the past 24 hour(s))   Basic Metabolic Panel    Collection Time: 02/22/21  5:22 AM   Result Value Ref Range    Sodium 138 135 - 144 mEq/L    Potassium 4.0 3.4 - 4.9 mEq/L    Chloride 103 95 - 107 mEq/L    CO2 27 20 - 31 mEq/L    Anion Gap 8 (L) 9 - 15 mEq/L    Glucose 106 (H) 70 - 99 mg/dL    BUN 12 8 - 23 mg/dL    CREATININE 0.74 0.50 - 0.90 mg/dL    GFR Non-African American >60.0 >60    GFR  >60.0 >60    Calcium 8.8 8.5 - 9.9 mg/dL   CBC    Collection Time: 02/22/21  5:22 AM   Result Value Ref Range    WBC 9.5 4.8 - 10.8 K/uL    RBC 3.36 (L) 4.20 - 5.40 M/uL    Hemoglobin 9.8 (L) 12.0 - 16.0 g/dL    Hematocrit 29.1 (L) 37.0 - 47.0 %    MCV 86.7 82.0 - 100.0 fL    MCH 29.0 27.0 - 31.3 pg    MCHC 33.5 33.0 - 37.0 %    RDW 14.1 11.5 - 14.5 %    Platelets 115 748 - 654 K/uL       Previous extensive, complex labs, notes and diagnostics reviewed and analyzed. ALLERGIES:    Allergies as of 02/19/2021 - Review Complete 02/19/2021   Allergen Reaction Noted    Latex  07/19/2017    Tape [adhesive tape]  06/28/2016      (please also verify by checking MAR)     Today I evaluated this patient for periodic reassessment of medical and functional status. The patient was discussed in detail at the treatment team meeting focusing on current medical issues, progress in therapies, social issues, psychological issues, barriers to progress and strategies to address these barriers, and discharge planning. See the addendum to rehab progress note-as a second progress note in the chart. The patient continues to be high risk for future disability and their medical and rehabilitation prognosis continue to be good and therefore, we will continue the patient's rehabilitation course as planned. The patient's tentative discharge date was set. Patient and family education was discussed. The patient was made aware of the team discussion regarding their progress.     Complex Physical Medicine & Rehab Issues Assess & Plan:   1. Severe abnormality of gait and mobility and impaired self-care and ADL's secondary to progressive weakness dt   CHF . Functional and medical status reassessed regarding patients ability to participate in therapies and patient found to be able to participate in acute intensive comprehensive inpatient rehabilitation program including PT/OT to improve balance, ambulation, ADLs, and to improve the P/AROM. Therapeutic modifications regarding activities in therapies, place, amount of time per day and intensity of therapy made daily. In bed therapies or bedside therapies prn.   2. Bowel progressive constipation, and Bladder dysfunction monitoring neurogenic bladder:  frequent toileting, ambulate to bathroom with assistance, check post void residuals. Check for C.difficile x1 if >2 loose stools in 24 hours, continue bowel & bladder program.  Monitor bowel and bladder function. Lactinex 2 PO every AC. MOM prn, Brown Bomb prn, Glycerin suppository prn, enema prn.  3. Severe lbp pain as well as generalized OA pain: reassess pain every shift and prior to and after each therapy session, give prn medications, modalities prn in therapy, Lidoderm, K-pad prn.   4. Skin healing and breakdown risk:  continue pressure relief program.  Daily skin exams and reports from nursing. 5. Severe fatigue due to nutritional and hydration deficiency: Add vitamin B12 vitamin D and CoQ10 continue to monitor I&Os, calorie counts prn, dietary consult prn. Add healthy HS snack. 6. Acute episodic insomnia with situational adjustment disorder:  prn Ambien, monitor for day time sedation. Add HS \"Tuck In\"  7. Falls risk elevated:  patient to use call light to get nursing assistance to get up, bed and chair alarm. 8. Elevated DVT risk: progressive activities in PT, continue prophylaxis PORTILLO hose, elevation and Xarelto.   9. Complex discharge planning: Discharge home alone 3/2/21 with help from her son and daughter

## 2021-02-23 LAB
ANION GAP SERPL CALCULATED.3IONS-SCNC: 6 MEQ/L (ref 9–15)
BUN BLDV-MCNC: 11 MG/DL (ref 8–23)
CALCIUM SERPL-MCNC: 8.6 MG/DL (ref 8.5–9.9)
CHLORIDE BLD-SCNC: 103 MEQ/L (ref 95–107)
CO2: 28 MEQ/L (ref 20–31)
CREAT SERPL-MCNC: 0.64 MG/DL (ref 0.5–0.9)
GFR AFRICAN AMERICAN: >60
GFR NON-AFRICAN AMERICAN: >60
GLUCOSE BLD-MCNC: 98 MG/DL (ref 70–99)
HCT VFR BLD CALC: 29.4 % (ref 37–47)
HEMOGLOBIN: 9.5 G/DL (ref 12–16)
MCH RBC QN AUTO: 28.5 PG (ref 27–31.3)
MCHC RBC AUTO-ENTMCNC: 32.5 % (ref 33–37)
MCV RBC AUTO: 87.7 FL (ref 82–100)
PDW BLD-RTO: 14.3 % (ref 11.5–14.5)
PLATELET # BLD: 253 K/UL (ref 130–400)
POTASSIUM SERPL-SCNC: 4 MEQ/L (ref 3.4–4.9)
RBC # BLD: 3.35 M/UL (ref 4.2–5.4)
SARS-COV-2, NAAT: NOT DETECTED
SODIUM BLD-SCNC: 137 MEQ/L (ref 135–144)
WBC # BLD: 7.3 K/UL (ref 4.8–10.8)

## 2021-02-23 PROCEDURE — 2700000000 HC OXYGEN THERAPY PER DAY

## 2021-02-23 PROCEDURE — 85027 COMPLETE CBC AUTOMATED: CPT

## 2021-02-23 PROCEDURE — 36415 COLL VENOUS BLD VENIPUNCTURE: CPT

## 2021-02-23 PROCEDURE — 6370000000 HC RX 637 (ALT 250 FOR IP): Performed by: INTERNAL MEDICINE

## 2021-02-23 PROCEDURE — 80048 BASIC METABOLIC PNL TOTAL CA: CPT

## 2021-02-23 PROCEDURE — 87635 SARS-COV-2 COVID-19 AMP PRB: CPT

## 2021-02-23 PROCEDURE — 97530 THERAPEUTIC ACTIVITIES: CPT

## 2021-02-23 PROCEDURE — 6370000000 HC RX 637 (ALT 250 FOR IP): Performed by: PHYSICAL MEDICINE & REHABILITATION

## 2021-02-23 PROCEDURE — 99232 SBSQ HOSP IP/OBS MODERATE 35: CPT | Performed by: PHYSICAL MEDICINE & REHABILITATION

## 2021-02-23 PROCEDURE — 1180000000 HC REHAB R&B

## 2021-02-23 PROCEDURE — 97535 SELF CARE MNGMENT TRAINING: CPT

## 2021-02-23 PROCEDURE — 97110 THERAPEUTIC EXERCISES: CPT

## 2021-02-23 PROCEDURE — 97116 GAIT TRAINING THERAPY: CPT

## 2021-02-23 PROCEDURE — 97112 NEUROMUSCULAR REEDUCATION: CPT

## 2021-02-23 RX ORDER — FERROUS SULFATE 325(65) MG
325 TABLET ORAL 2 TIMES DAILY WITH MEALS
Status: DISCONTINUED | OUTPATIENT
Start: 2021-02-23 | End: 2021-03-02 | Stop reason: HOSPADM

## 2021-02-23 RX ADMIN — FERROUS SULFATE TAB 325 MG (65 MG ELEMENTAL FE) 325 MG: 325 (65 FE) TAB at 09:36

## 2021-02-23 RX ADMIN — CYANOCOBALAMIN TAB 500 MCG 1000 MCG: 500 TAB at 08:26

## 2021-02-23 RX ADMIN — SULFAMETHOXAZOLE AND TRIMETHOPRIM 1 TABLET: 800; 160 TABLET ORAL at 08:26

## 2021-02-23 RX ADMIN — LEVOTHYROXINE SODIUM 88 MCG: 88 TABLET ORAL at 06:04

## 2021-02-23 RX ADMIN — LACTOBACILLUS TAB 2 TABLET: TAB at 08:26

## 2021-02-23 RX ADMIN — LACTOBACILLUS TAB 2 TABLET: TAB at 20:36

## 2021-02-23 RX ADMIN — FERROUS SULFATE TAB 325 MG (65 MG ELEMENTAL FE) 325 MG: 325 (65 FE) TAB at 17:32

## 2021-02-23 RX ADMIN — PANTOPRAZOLE SODIUM 40 MG: 40 TABLET, DELAYED RELEASE ORAL at 17:32

## 2021-02-23 RX ADMIN — CARVEDILOL 3.12 MG: 3.12 TABLET, FILM COATED ORAL at 20:36

## 2021-02-23 RX ADMIN — ASPIRIN 81 MG: 81 TABLET, CHEWABLE ORAL at 08:26

## 2021-02-23 RX ADMIN — MIDODRINE HYDROCHLORIDE 5 MG: 5 TABLET ORAL at 17:32

## 2021-02-23 RX ADMIN — CITALOPRAM HYDROBROMIDE 20 MG: 20 TABLET ORAL at 08:26

## 2021-02-23 RX ADMIN — MIDODRINE HYDROCHLORIDE 5 MG: 5 TABLET ORAL at 08:26

## 2021-02-23 RX ADMIN — MIDODRINE HYDROCHLORIDE 5 MG: 5 TABLET ORAL at 13:04

## 2021-02-23 RX ADMIN — DIGOXIN 125 MCG: 125 TABLET ORAL at 08:26

## 2021-02-23 RX ADMIN — SULFAMETHOXAZOLE AND TRIMETHOPRIM 1 TABLET: 800; 160 TABLET ORAL at 17:32

## 2021-02-23 RX ADMIN — ACETAMINOPHEN 650 MG: 325 TABLET ORAL at 20:46

## 2021-02-23 RX ADMIN — POLYETHYLENE GLYCOL 3350 17 G: 17 POWDER, FOR SOLUTION ORAL at 17:32

## 2021-02-23 RX ADMIN — PANTOPRAZOLE SODIUM 40 MG: 40 TABLET, DELAYED RELEASE ORAL at 06:04

## 2021-02-23 RX ADMIN — LACTOBACILLUS TAB 2 TABLET: TAB at 13:04

## 2021-02-23 RX ADMIN — FUROSEMIDE 20 MG: 20 TABLET ORAL at 08:26

## 2021-02-23 RX ADMIN — POTASSIUM CHLORIDE 20 MEQ: 20 TABLET, EXTENDED RELEASE ORAL at 08:26

## 2021-02-23 RX ADMIN — CARVEDILOL 3.12 MG: 3.12 TABLET, FILM COATED ORAL at 08:26

## 2021-02-23 ASSESSMENT — PAIN SCALES - GENERAL
PAINLEVEL_OUTOF10: 0
PAINLEVEL_OUTOF10: 4

## 2021-02-23 ASSESSMENT — PAIN DESCRIPTION - DESCRIPTORS: DESCRIPTORS: ACHING

## 2021-02-23 ASSESSMENT — PAIN DESCRIPTION - LOCATION: LOCATION: GENERALIZED

## 2021-02-23 NOTE — PROGRESS NOTES
Noted steady decline of pt H/H to Dr. Janice Wang and inquired about PO iron via phone message. Will continue to monitor throughout my shift, no apparent bleeding at this time.  Electronically signed by Rere Razo RN on 2/23/2021 at 7:55 AM

## 2021-02-23 NOTE — PROGRESS NOTES
Occupational Therapy  Facility/Department: [facility]  Daily Treatment Note  NAME: Santo Donald  : 1932  MRN: [id number]    Date of Service: 2021    Discharge Recommendations:  Continue to assess pending progress       Assessment      Activity Tolerance  Activity Tolerance: Patient Tolerated treatment well  Activity Tolerance: 3L O2  Safety Devices  Type of devices: All fall risk precautions in place         Patient Diagnosis(es): There were no encounter diagnoses. has a past medical history of Ascending aortic aneurysm ([address] Charcot Arleen Tooth muscular atrophy, Depression, Descending aortic aneurysm ([address] Essential hypertension, Headache, HTN (hypertension), Impaired mobility and activities of daily living, Lumbar stenosis with neurogenic claudication, Myelopathy ([address] and Osteoarthritis. has a past surgical history that includes joint replacement and thoracentesis (Left, 2021). Restrictions  Restrictions/Precautions  Restrictions/Precautions: Fall Risk     Subjective   General  Chart Reviewed: Yes  Patient assessed for rehabilitation services?: Yes  Family / Caregiver Present: No  Diagnosis: Impaired mobility and ADLs d/t CHF exacerbation    Subjective  Subjective: \"It's time to go? \"    Pain Assessment  Pain Level: 0  Pain Type: Chronic pain  Pain Location: Chest  Pain Orientation: Mid  Pain Descriptors: Aching  Pre Treatment Pain Screening  Pain at present: 0  Intervention List: Patient able to continue with treatment     Orientation  Orientation  Overall Orientation Status: Within Normal Limits     Objective      Balance  Standing Balance: Stand by assistance  Standing Balance  Time: able to stand up to 12 minutes 35 seconds with 0-2 UE support x multiple trials, 0 LOB  Activity: matching cards on velcro board with MOD difficulty identfying correct matches           Plan   Plan  Times per week: 5-7x week  Plan weeks: 1-2  Current Treatment Recommendations: Strengthening, Safety Education & Training, Balance Training, Patient/Caregiver Education & Training, Self-Care / ADL, Functional Mobility Training, Neuromuscular Re-education, Endurance Training    Plan Comment: Continue per OT POC for planned d/c on 3-2-21         Goals  Patient Goals   Patient goals :  To go home       Therapy Time   Individual Concurrent Group Co-treatment   Time In 1300         Time Out 1330         Minutes 30             Therapeutic activities: 30 minutes     Electronically signed by Emmie Woodward on 2/23/21 at 3:50 PM DIANE Donohue , [place]

## 2021-02-23 NOTE — PROGRESS NOTES
Short stay surgery called and canceled pt procedure R/T Xarelto not being held and given on 2/22. Updated therapy, pt son (POA) & Dr. Eri Baires. Procedure is tentatively scheduled for this coming Thursday.  Electronically signed by Collin Fuller RN on 2/23/2021 at 7:54 AM

## 2021-02-23 NOTE — PROGRESS NOTES
Physical Therapy Rehab Treatment Note  [facility]/Department: [facility]  [facility]       NAME: Camila Bansal  : 1932 (80 y.o.)  MRN: [id number]  CODE STATUS: Full Code    Date of Service: 2021  Chart Reviewed: Yes  Family / Caregiver Present: No  General Comment  Comments: agreeable to tx after breakfast    Restrictions:  Restrictions/Precautions: Fall Risk       SUBJECTIVE: Subjective: \"no surgery today\"  Pain Screening  Patient Currently in Pain: Denies       Post Treatment Pain Screening: \"this infection is painful\" (UTI)  Pain Assessment  Pain Assessment: 0-10  Pain Level: 0    OBJECTIVE:   Overall Orientation Status: Within Functional Limits           Neuromuscular Education  Neuromuscular Comments: static standing at ww. sit to stands x 5         Transfers  Sit to Stand: Stand by assistance;Contact guard assistance  Stand to sit: Contact guard assistance  Comment: decreased safety with approach to chair. Ambulation  Ambulation?: Yes  Ambulation 1  Surface: carpet  Device: Rolling Walker  Other Apparatus: O2  Assistance: Contact guard assistance  Quality of Gait: steppage gt to clear floor, mild trunk flexion, stops to rest after 30 feet  Gait Deviations: Slow Lea; Increased JAMES  Distance: 80 feet with turns and standing rests  Comments: 93% post ambulation  Additional ambulation 80 feet with turns. One standing rest. Very taxing for pt. 97% after seated rest. 3L02                 Other exercises  Other exercises 4: standing therex at ww. Standing marches and squats  Seated squats  ASSESSMENT/COMMENTS:sob with all activity. sats 93-98% on 3L02       PLAN OF CARE/Safety: ongoing. Therapy Time:   Individual   Time In 915   Time Out 1000   Minutes 45   15 minutes missed due to eating breakfast. pts surgery on hold and and rescheduled for Thursday.    Minutes:45      Transfer/Bed mobility trainin      Gait training:10      Neuro re education:8     Therapeutic ex:Mike Whitaker PTA, 02/23/21 at 9:46 AM

## 2021-02-23 NOTE — PROGRESS NOTES
Patient is resting in bed with no c/o pain or discomfort noted at this time. O2 @ 3L intact. . Zeng is intact and patent. Patient has be reminded to not ambulate without supervision. Gait is slow and unsteady at times. 0000- Patient is NPO at midnight r/t procedure she is to have in the a.m. Consents have been signed.

## 2021-02-23 NOTE — PROGRESS NOTES
Subjective: The patient complains of  moderate to severe acute    CHF partially relieved by rest, PT, OT,   and exacerbated by recent illness and exertion. I am concerned about patients  frailty and bleeding risk on Xarelto. Her planned procedure will be delayed until Thursday because Xarelto needs to be held. I am concerned about her progressive anemia have added iron we will monitor for bleeding. According to recent nursing note, \"Noted steady decline of pt H/H to Dr. Susanna Baeza and inquired about PO iron via phone message. Will continue to monitor throughout my shift, no apparent bleeding at this time\"    ROS x10: The patient also complains of severely impaired mobility and activities of daily living. Otherwise no new problems with vision, hearing, nose, mouth, throat, dermal, cardiovascular, GI, , pulmonary, musculoskeletal, psychiatric or neurological. See Rehab H&P on Rehab chart dated . Vital signs:  BP (!) 150/83   Pulse 64   Temp 98 °F (36.7 °C)   Resp 15   Ht 5' 3\" (1.6 m)   Wt 171 lb 12.8 oz (77.9 kg)   SpO2 93%   BMI 30.43 kg/m²   I/O:   PO/Intake:  fair PO intake, no problems observed or reported. Bowel/Bladder:   UTI Bactrim, constipation  ,taylor for retention  General:  Patient is well developed, adequately nourished, non-obese and     well kempt. HEENT:    PERRLA, hearing intact to loud voice, external inspection of ear     and nose benign. Inspection of lips, tongue and gums benign  Musculoskeletal: No significant change in strength or tone. All joints stable. Inspection and palpation of digits and nails show no clubbing,       cyanosis or inflammatory conditions. Neuro/Psychiatric: Affect: flat. Alert and oriented to person, place and     situation. No significant change in deep tendon reflexes or     sensation  Lungs:  Diminished, CTA-B. Respiration effort is normal at rest.     Heart:   S1 = S2, RRR. No loud murmurs.     Abdomen:  Soft, non-tender, no enlargement of liver or spleen. Extremities:  No significant lower extremity edema or tenderness. Skin:   Intact to general survey, no visualized or palpated problems. Rehabilitation:  Physical therapy: FIMS:  Bed Mobility: Scooting: Minimal assistance    Transfers: Sit to Stand: Stand by assistance, Contact guard assistance  Stand to sit: Stand by assistance, Contact guard assistance  Bed to Chair: Stand by assistance, Ambulation 1  Surface: carpet  Device: Rolling Walker  Other Apparatus: O2  Assistance: Contact guard assistance  Quality of Gait: steppage gt due to bilateral drop foot, downward gaze with increased trunk flexion  Gait Deviations: Slow Lea, Increased JAMES  Distance: 80 feet  Comments: 94% pre ambulation, Stairs  # Steps : 4  Stairs Height: 6\"  Rails: Bilateral  Device: No Device  Assistance: Contact guard assistance  Comment: Stairs completed without reports of chest pain SpO2 95% post stairs    FIMS:  ,  , Assessment: Patient continues to show SOB and fatigue with gait and stair negotiation, Patient maintains 95%+ SpO2  throughout therapy session    Occupational therapy: FIMS:   ,  , Assessment: Pt is 79 y/o female presenting with the above deficits, resulting in increased dependence for safe competion of self care ADLs. Pt limited by weakness/decreased endurance and poor  strength for fine motor areas of care. Pt would benefit from OT to improve on condition to maximize safety and independence with functional transfers, mobility an self care.     Speech therapy: FIMS:        Lab/X-ray studies reviewed, analyzed and discussed with patient and staff:   Recent Results (from the past 24 hour(s))   Basic Metabolic Panel    Collection Time: 02/23/21  5:40 AM   Result Value Ref Range    Sodium 137 135 - 144 mEq/L    Potassium 4.0 3.4 - 4.9 mEq/L    Chloride 103 95 - 107 mEq/L    CO2 28 20 - 31 mEq/L    Anion Gap 6 (L) 9 - 15 mEq/L    Glucose 98 70 - 99 mg/dL    BUN 11 8 - 23 mg/dL CREATININE 0.64 0.50 - 0.90 mg/dL    GFR Non-African American >60.0 >60    GFR  >60.0 >60    Calcium 8.6 8.5 - 9.9 mg/dL   CBC    Collection Time: 02/23/21  5:40 AM   Result Value Ref Range    WBC 7.3 4.8 - 10.8 K/uL    RBC 3.35 (L) 4.20 - 5.40 M/uL    Hemoglobin 9.5 (L) 12.0 - 16.0 g/dL    Hematocrit 29.4 (L) 37.0 - 47.0 %    MCV 87.7 82.0 - 100.0 fL    MCH 28.5 27.0 - 31.3 pg    MCHC 32.5 (L) 33.0 - 37.0 %    RDW 14.3 11.5 - 14.5 %    Platelets 408 891 - 423 K/uL       Previous extensive, complex labs, notes and diagnostics reviewed and analyzed. ALLERGIES:    Allergies as of 02/19/2021 - Review Complete 02/19/2021   Allergen Reaction Noted    Latex  07/19/2017    Tape [adhesive tape]  06/28/2016      (please also verify by checking MAR)      Yesterday I evaluated this patient for periodic reassessment of medical and functional status. The patient was discussed in detail at the treatment team meeting focusing on current medical issues, progress in therapies, social issues, psychological issues, barriers to progress and strategies to address these barriers, and discharge planning. See the hand written addendum to rehab progress note. The patient continues to be high risk for future disability and their medical and rehabilitation prognosis continue to be good and therefore, we will continue the patient's rehabilitation course as planned. The patient's tentative discharge date was set. Patient and family education was discussed. The patient was made aware of the team discussion regarding their progress. Discharge plans were discussed along with barriers to progress and strategies to address these barriers, patient encouraged to continue to discuss discharge plans with . Complex Physical Medicine & Rehab Issues Assess & Plan:   1. Severe abnormality of gait and mobility and impaired self-care and ADL's secondary to progressive weakness dt   CHF .   Functional and medical status reassessed regarding patients ability to participate in therapies and patient found to be able to participate in acute intensive comprehensive inpatient rehabilitation program including PT/OT to improve balance, ambulation, ADLs, and to improve the P/AROM. Therapeutic modifications regarding activities in therapies, place, amount of time per day and intensity of therapy made daily. In bed therapies or bedside therapies prn.   2. Bowel progressive constipation, and Bladder dysfunction monitoring neurogenic bladder:  frequent toileting, ambulate to bathroom with assistance, check post void residuals. Check for C.difficile x1 if >2 loose stools in 24 hours, continue bowel & bladder program.  Monitor bowel and bladder function. Lactinex 2 PO every AC. MOM prn, Brown Bomb prn, Glycerin suppository prn, enema prn.  3. Severe lbp pain as well as generalized OA pain: reassess pain every shift and prior to and after each therapy session, give prn medications, modalities prn in therapy, Lidoderm, K-pad prn.   4. Skin healing and breakdown risk:  continue pressure relief program.  Daily skin exams and reports from nursing. 5. Severe fatigue due to nutritional and hydration deficiency: Add vitamin B12 vitamin D and CoQ10 continue to monitor I&Os, calorie counts prn, dietary consult prn. Add healthy HS snack. 6. Acute episodic insomnia with situational adjustment disorder:  prn Ambien, monitor for day time sedation. Add HS \"Tuck In\"  7. Falls risk elevated:  patient to use call light to get nursing assistance to get up, bed and chair alarm. 8. Elevated DVT risk: progressive activities in PT, continue prophylaxis PORTILLO hose, lynette and Xarelto will be on hold pending thoracic surgery. 9. Complex discharge planning: Discharge home alone 3/2/21 with help from her son and daughter as well as home health care.   She is status post weekly team meeting every Monday to assess progress towards goals, discuss and address social, psychological and medical comorbidities and to address difficulties they may be having progressing in therapy. Patient and family education is in progress. The patient is to follow-up with their family physician after discharge. Complex Active General Medical Issues that complicate care Assess & Plan:     1. Principal Problem:    Impaired mobility and activities of daily living due to CHF ex, [facility] admit 2021  Active Problems:  1. Paroxysmal atrial fibrillation,  Essential hypertension with low blood pressures of late, recurrent pleural effusions acute on chronic combined systolic and diastolic CHF (congestive heart failure)-continue blood signs every shift focusing on heart rate and blood pressure checks, consult hospitalist for backup medical and adjust/add medications (aspirin, Xarelto, Coreg, Cardizem, Lasix, Nitrostat) plan as above consult cardiology titrate IV Lasix recheck chest x-ray wean high flow oxygen patient  requires a VATS procedure-scheduled for 2/25/2021. .  Midodrine titration add abdominal binders and teds  2. Acute severe UTI-Bactrim and monitor for residual urine in the bladder dose Bactrim with food add IM Rocephin check urine culture check postvoid residuals  3. Lumbar stenosis with neurogenic claudication  4. Charcot Arleen Tooth muscular atrophy, Osteoarthritis of lumbar spine with myelopathy,  Osteoarthritis  5. Severe depression and anxiety -emotional support provided daily, vitamin B12, encourage participation in rehabilitation support group and recreational therapy, adjust/add medications (Celexa Ativan)  6.  Hypothyroidism-titrate Synthroid            Marsha Berkowitz D.O., PM&R     Attending    [facility] Pamela Cabrera

## 2021-02-23 NOTE — PROGRESS NOTES
have tremors in B hands. Patient engaged in B FM coordination and strengthening to increase I with fasteners and small objects for ADL's and IADL's. Patient able to remove orange MIN resistive theraputty from container with MAX difficulty. Patient able to roll theraputty into log shape with MAX difficulty. Patient able to place 15 small beads 1 at a time in theraputty with MAX difficulty. Patient able to roll theraputty into ball shape with MOD difficulty. Patient able to flatten theraputty with MAX difficulty. Patient able to pinch theraputty with MOD difficulty to locate beads. Patient able to remove beads with MOD difficulty. Patient noted to have MAX difficulty manipulating beads. Patient engaged in B UE ROM/strengthening, B FM coordination and cognition to increase I with ADL's, IADL's and transfers. Patient donned B 1 # wrist weights. Patient able to reach in lateral planes with 0 difficulty. Patient able to reach in forward planes with MIN difficulty. Patient able to  100 large mushroom pegs with 0 difficulty 1 at a time. Patient able to place large pegs 1 at a time in pegboard with 0 difficulty. Patient alternated UE's after every 10th peg. Patient with RB's as needed. Plan   Plan  Times per week: 5-7x week  Plan weeks: 1-2  Current Treatment Recommendations: Strengthening, Safety Education & Training, Balance Training, Patient/Caregiver Education & Training, Self-Care / ADL, Functional Mobility Training, Neuromuscular Re-education, Endurance Training    Plan Comment: Continue per OT POC for planned d/c on 3-2-21         Goals  Patient Goals   Patient goals :  To go home       Therapy Time   Individual Concurrent Group Co-treatment   Time In 1100         Time Out 1200         Minutes 60             Therapeutic activities: 60 minutes     Electronically signed by Cy Guevara on 2/23/21 at 3:46 PM PRUDENCE Henriquez

## 2021-02-23 NOTE — PROGRESS NOTES
Physical Therapy Rehab Treatment Note  [facility]  [facility]       NAME: Kristy Lopez  : 1932 (80 y.o.)  MRN: [id number]  CODE STATUS: Full Code    Date of Service: 2021  Chart Reviewed: Yes  Family / Caregiver Present: No    Restrictions:  Restrictions/Precautions: Fall Risk       SUBJECTIVE: Subjective: My back hurts when up straight  Response To Previous Treatment: Patient with no complaints from previous session. Pain Screening  Patient Currently in Pain: No  Pre Treatment Pain Screening  Pain at present: 0  Scale Used: Numeric Score  Intervention List: Patient able to continue with treatment    Post Treatment Pain Screening:  Pain Assessment  Pain Assessment: 0-10  Pain Level: 0    OBJECTIVE:   Overall Orientation Status: Within Functional Limits    Transfers  Sit to Stand: Stand by assistance  Stand to sit: Stand by assistance    Ambulation  Ambulation?: Yes  More Ambulation?: No  Ambulation 1  Surface: carpet  Device: Rolling Walker  Other Apparatus: O2  Quality of Gait: steppage gt to clear floor, mild trunk flexion, 1 standing rest break required to complete extended distance  Gait Deviations: Slow Lea; Increased AJMES  Comments: 96% SpO2 post gait training    Stairs/Curb  Stairs?: Yes  Stairs  # Steps : 4  Stairs Height: 6\"  Rails: Bilateral  Device: No Device  Assistance: Contact guard assistance;Stand by assistance  Comment: Non reciprocal pattern Increased SOB SpO2 96% post stair negotiation    Exercises  Hip Flexion: x 20  Knee Long Arc Quad: x 20  Ankle Pumps: PF x 20     ASSESSMENT/COMMENTS:  Body structures, Functions, Activity limitations: Decreased functional mobility ; Decreased safe awareness;Decreased balance;Decreased ADL status; Decreased endurance;Decreased strength;Decreased posture  Assessment: Patient SOB with gait and stairs and requires extended seated rest break to continue with therapy session.  Patient maintains 96% SpO2 throughout therapy session    PLAN OF CARE/Safety:   Safety Devices  Type of devices:  All fall risk precautions in place      Therapy Time:   Individual   Time In 1330   Time Out 1400   Minutes 30     Minutes: 30      Transfer/Bed mobility trainin      Gait training: 15     Therapeutic ex: JERRICA Leonard, 21 at 3:18 PM

## 2021-02-23 NOTE — PLAN OF CARE
Problem: Falls - Risk of:  Goal: Will remain free from falls  Description: Will remain free from falls  2/23/2021 0259 by Kateri Cowden, RN  Outcome: Ongoing     Problem: Falls - Risk of:  Goal: Absence of physical injury  Description: Absence of physical injury  2/23/2021 0259 by Kateri Cowden, RN  Outcome: Ongoing     Problem: Mobility - Impaired:  Goal: Mobility will improve  Description: Mobility will improve  2/23/2021 0259 by Kateri Cowden, RN  Outcome: Ongoing

## 2021-02-23 NOTE — PROGRESS NOTES
Occupational Therapy  Facility/Department: [facility]  Daily Treatment Note  NAME: Diane Hall  : 1932  MRN: [id number]    Date of Service: 2021    Discharge Recommendations:  Continue to assess pending progress       Assessment      Activity Tolerance  Activity Tolerance: Patient Tolerated treatment well  Activity Tolerance: 3L O2  Safety Devices  Type of devices: All fall risk precautions in place         Patient Diagnosis(es): There were no encounter diagnoses. has a past medical history of Ascending aortic aneurysm ([address] Charcot Arleen Tooth muscular atrophy, Depression, Descending aortic aneurysm ([address] Essential hypertension, Headache, HTN (hypertension), Impaired mobility and activities of daily living, Lumbar stenosis with neurogenic claudication, Myelopathy ([address] and Osteoarthritis. has a past surgical history that includes joint replacement and thoracentesis (Left, 2021). Restrictions  Restrictions/Precautions  Restrictions/Precautions: Fall Risk     Subjective   General  Chart Reviewed: Yes  Patient assessed for rehabilitation services?: Yes  Family / Caregiver Present: No  Diagnosis: Impaired mobility and ADLs d/t CHF exacerbation    Subjective  Subjective: \"What? Exercises? 20?\"    Pain Assessment  Pain Level: 0  Pain Type: Chronic pain  Pain Location: Chest  Pain Orientation: Mid  Pain Descriptors: Aching  Pre Treatment Pain Screening  Pain at present: 0  Intervention List: Patient able to continue with treatment     Orientation  Orientation  Overall Orientation Status: Within Normal Limits     Objective      Patient engaged in B UE ROM and strengthening to increase I with ADL's and transfers. Patient utilized 1 # hand wt 2 X 20 repetitions in various planes with RB's as needed. Patient required MIN verbal cues for proper technique. Patient required 0 verbal cues for correct count.         Plan   Plan  Times per week: 5-7x week  Plan weeks: 1-2  Current Treatment Recommendations: Strengthening, Safety Education & Training, Balance Training, Patient/Caregiver Education & Training, Self-Care / ADL, Functional Mobility Training, Neuromuscular Re-education, Endurance Training    Plan Comment: Continue per OT POC for planned d/c on 3-2-21         Goals  Patient Goals   Patient goals :  To go home       Therapy Time   Individual Concurrent Group Co-treatment   Time In 1443         Time Out 1458         Minutes 15             Therapeutic activities: 15 minutes     Electronically signed by Castro FOSS on 2/23/21 at 4:24 PM EST    PRUDENCE Wilson

## 2021-02-24 ENCOUNTER — ANESTHESIA EVENT (OUTPATIENT)
Dept: OPERATING ROOM | Age: 86
End: 2021-02-24

## 2021-02-24 LAB
ANION GAP SERPL CALCULATED.3IONS-SCNC: 9 MEQ/L (ref 9–15)
BUN BLDV-MCNC: 12 MG/DL (ref 8–23)
CALCIUM SERPL-MCNC: 9 MG/DL (ref 8.5–9.9)
CHLORIDE BLD-SCNC: 102 MEQ/L (ref 95–107)
CO2: 27 MEQ/L (ref 20–31)
CREAT SERPL-MCNC: 0.77 MG/DL (ref 0.5–0.9)
GFR AFRICAN AMERICAN: >60
GFR NON-AFRICAN AMERICAN: >60
GLUCOSE BLD-MCNC: 98 MG/DL (ref 70–99)
HCT VFR BLD CALC: 30.9 % (ref 37–47)
HEMOGLOBIN: 10.2 G/DL (ref 12–16)
MCH RBC QN AUTO: 28.8 PG (ref 27–31.3)
MCHC RBC AUTO-ENTMCNC: 33.2 % (ref 33–37)
MCV RBC AUTO: 86.9 FL (ref 82–100)
ORGANISM: ABNORMAL
PDW BLD-RTO: 14.2 % (ref 11.5–14.5)
PLATELET # BLD: 275 K/UL (ref 130–400)
POTASSIUM SERPL-SCNC: 4.4 MEQ/L (ref 3.4–4.9)
RBC # BLD: 3.55 M/UL (ref 4.2–5.4)
SODIUM BLD-SCNC: 138 MEQ/L (ref 135–144)
URINE CULTURE, ROUTINE: ABNORMAL
WBC # BLD: 8.3 K/UL (ref 4.8–10.8)

## 2021-02-24 PROCEDURE — 97530 THERAPEUTIC ACTIVITIES: CPT

## 2021-02-24 PROCEDURE — 99232 SBSQ HOSP IP/OBS MODERATE 35: CPT | Performed by: PHYSICAL MEDICINE & REHABILITATION

## 2021-02-24 PROCEDURE — 80048 BASIC METABOLIC PNL TOTAL CA: CPT

## 2021-02-24 PROCEDURE — 99231 SBSQ HOSP IP/OBS SF/LOW 25: CPT | Performed by: THORACIC SURGERY (CARDIOTHORACIC VASCULAR SURGERY)

## 2021-02-24 PROCEDURE — 36415 COLL VENOUS BLD VENIPUNCTURE: CPT

## 2021-02-24 PROCEDURE — 94761 N-INVAS EAR/PLS OXIMETRY MLT: CPT

## 2021-02-24 PROCEDURE — 97112 NEUROMUSCULAR REEDUCATION: CPT

## 2021-02-24 PROCEDURE — 97535 SELF CARE MNGMENT TRAINING: CPT

## 2021-02-24 PROCEDURE — 6370000000 HC RX 637 (ALT 250 FOR IP): Performed by: INTERNAL MEDICINE

## 2021-02-24 PROCEDURE — 97116 GAIT TRAINING THERAPY: CPT

## 2021-02-24 PROCEDURE — 94640 AIRWAY INHALATION TREATMENT: CPT

## 2021-02-24 PROCEDURE — 6370000000 HC RX 637 (ALT 250 FOR IP): Performed by: PHYSICAL MEDICINE & REHABILITATION

## 2021-02-24 PROCEDURE — 97110 THERAPEUTIC EXERCISES: CPT

## 2021-02-24 PROCEDURE — 1180000000 HC REHAB R&B

## 2021-02-24 PROCEDURE — 85027 COMPLETE CBC AUTOMATED: CPT

## 2021-02-24 RX ORDER — TETRAHYDROZOLINE HCL 0.05 %
1 DROPS OPHTHALMIC (EYE) 3 TIMES DAILY
Status: DISCONTINUED | OUTPATIENT
Start: 2021-02-24 | End: 2021-03-02 | Stop reason: HOSPADM

## 2021-02-24 RX ORDER — MINERAL OIL AND WHITE PETROLATUM 150; 830 MG/G; MG/G
OINTMENT OPHTHALMIC PRN
Status: DISCONTINUED | OUTPATIENT
Start: 2021-02-24 | End: 2021-03-02 | Stop reason: HOSPADM

## 2021-02-24 RX ORDER — POLYETHYLENE GLYCOL 3350 17 G/17G
17 POWDER, FOR SOLUTION ORAL DAILY
Status: DISCONTINUED | OUTPATIENT
Start: 2021-02-25 | End: 2021-03-02 | Stop reason: HOSPADM

## 2021-02-24 RX ADMIN — LEVOTHYROXINE SODIUM 88 MCG: 88 TABLET ORAL at 06:40

## 2021-02-24 RX ADMIN — FERROUS SULFATE TAB 325 MG (65 MG ELEMENTAL FE) 325 MG: 325 (65 FE) TAB at 17:29

## 2021-02-24 RX ADMIN — PROMETHAZINE HYDROCHLORIDE 12.5 MG: 12.5 TABLET ORAL at 17:29

## 2021-02-24 RX ADMIN — ASPIRIN 81 MG: 81 TABLET, CHEWABLE ORAL at 08:56

## 2021-02-24 RX ADMIN — PANTOPRAZOLE SODIUM 40 MG: 40 TABLET, DELAYED RELEASE ORAL at 17:36

## 2021-02-24 RX ADMIN — BUDESONIDE AND FORMOTEROL FUMARATE DIHYDRATE 2 PUFF: 160; 4.5 AEROSOL RESPIRATORY (INHALATION) at 16:07

## 2021-02-24 RX ADMIN — PROMETHAZINE HYDROCHLORIDE 12.5 MG: 12.5 TABLET ORAL at 08:55

## 2021-02-24 RX ADMIN — LACTOBACILLUS TAB 2 TABLET: TAB at 08:56

## 2021-02-24 RX ADMIN — ACETAMINOPHEN 650 MG: 325 TABLET ORAL at 09:08

## 2021-02-24 RX ADMIN — FERROUS SULFATE TAB 325 MG (65 MG ELEMENTAL FE) 325 MG: 325 (65 FE) TAB at 08:56

## 2021-02-24 RX ADMIN — CYANOCOBALAMIN TAB 500 MCG 1000 MCG: 500 TAB at 12:48

## 2021-02-24 RX ADMIN — CARVEDILOL 3.12 MG: 3.12 TABLET, FILM COATED ORAL at 20:44

## 2021-02-24 RX ADMIN — DIGOXIN 125 MCG: 125 TABLET ORAL at 08:56

## 2021-02-24 RX ADMIN — MIDODRINE HYDROCHLORIDE 5 MG: 5 TABLET ORAL at 17:29

## 2021-02-24 RX ADMIN — TETRAHYDROZOLINE HCL 1 DROP: 0.05 SOLUTION/ DROPS OPHTHALMIC at 20:46

## 2021-02-24 RX ADMIN — SULFAMETHOXAZOLE AND TRIMETHOPRIM 1 TABLET: 800; 160 TABLET ORAL at 17:29

## 2021-02-24 RX ADMIN — FUROSEMIDE 20 MG: 20 TABLET ORAL at 08:56

## 2021-02-24 RX ADMIN — TETRAHYDROZOLINE HCL 1 DROP: 0.05 SOLUTION/ DROPS OPHTHALMIC at 12:50

## 2021-02-24 RX ADMIN — MIDODRINE HYDROCHLORIDE 5 MG: 5 TABLET ORAL at 08:56

## 2021-02-24 RX ADMIN — MIDODRINE HYDROCHLORIDE 5 MG: 5 TABLET ORAL at 12:47

## 2021-02-24 RX ADMIN — CARVEDILOL 3.12 MG: 3.12 TABLET, FILM COATED ORAL at 08:56

## 2021-02-24 RX ADMIN — PANTOPRAZOLE SODIUM 40 MG: 40 TABLET, DELAYED RELEASE ORAL at 06:40

## 2021-02-24 RX ADMIN — LACTOBACILLUS TAB 2 TABLET: TAB at 12:47

## 2021-02-24 RX ADMIN — LACTOBACILLUS TAB 2 TABLET: TAB at 20:44

## 2021-02-24 RX ADMIN — SULFAMETHOXAZOLE AND TRIMETHOPRIM 1 TABLET: 800; 160 TABLET ORAL at 08:56

## 2021-02-24 RX ADMIN — CITALOPRAM HYDROBROMIDE 20 MG: 20 TABLET ORAL at 08:56

## 2021-02-24 RX ADMIN — POTASSIUM CHLORIDE 20 MEQ: 20 TABLET, EXTENDED RELEASE ORAL at 08:56

## 2021-02-24 ASSESSMENT — PAIN DESCRIPTION - ORIENTATION
ORIENTATION: ANTERIOR
ORIENTATION: MID

## 2021-02-24 ASSESSMENT — PAIN DESCRIPTION - LOCATION
LOCATION: HEAD
LOCATION: CHEST

## 2021-02-24 ASSESSMENT — PAIN DESCRIPTION - DESCRIPTORS: DESCRIPTORS: OTHER (COMMENT)

## 2021-02-24 ASSESSMENT — PAIN SCALES - GENERAL
PAINLEVEL_OUTOF10: 5
PAINLEVEL_OUTOF10: 5

## 2021-02-24 ASSESSMENT — PAIN DESCRIPTION - PAIN TYPE
TYPE: ACUTE PAIN
TYPE: ACUTE PAIN

## 2021-02-24 ASSESSMENT — PAIN DESCRIPTION - FREQUENCY: FREQUENCY: INTERMITTENT

## 2021-02-24 NOTE — PROGRESS NOTES
Subjective: The patient complains of  moderate to severe acute    CHF partially relieved by rest, PT, OT,   and exacerbated by recent illness and exertion. I am concerned about patients  frailty and bleeding risk on Xarelto. Her Xarelto has been on hold and now she is ready for her surgery. We rechecked her COVID-19 and it is still negative fortunately. According to recent sick surgery note, \" Pt for OR in AM for left Pleurx catheter insertion . Plan to return to rehab post op. NPO after midnight. \"    ROS x10: The patient also complains of severely impaired mobility and activities of daily living. Otherwise no new problems with vision, hearing, nose, mouth, throat, dermal, cardiovascular, GI, , pulmonary, musculoskeletal, psychiatric or neurological. See Rehab H&P on Rehab chart dated . Vital signs:  /66   Pulse 63   Temp 97 °F (36.1 °C) (Oral)   Resp 18   Ht 5' 3\" (1.6 m)   Wt 171 lb 12.8 oz (77.9 kg)   SpO2 92%   BMI 30.43 kg/m²   I/O:   PO/Intake:  fair PO intake, no problems observed or reported. Bowel/Bladder:   UTI Bactrim, constipation  ,taylor for retention  General:  Patient is well developed, adequately nourished, non-obese and     well kempt. HEENT:    PERRLA, hearing intact to loud voice, external inspection of ear     and nose benign. Inspection of lips, tongue and gums benign  Musculoskeletal: No significant change in strength or tone. All joints stable. Inspection and palpation of digits and nails show no clubbing,       cyanosis or inflammatory conditions. Neuro/Psychiatric: Affect: flat. Alert and oriented to person, place and     situation. No significant change in deep tendon reflexes or     sensation  Lungs:  Diminished, CTA-B. Respiration effort is normal at rest.     Heart:   S1 = S2, RRR. No loud murmurs. Abdomen:  Soft, non-tender, no enlargement of liver or spleen.   Extremities:  No significant lower extremity edema or tenderness. Skin:   Intact to general survey, no visualized or palpated problems. Rehabilitation:  Physical therapy: FIMS:  Bed Mobility: Scooting: Minimal assistance    Transfers: Sit to Stand: Stand by assistance  Stand to sit: Stand by assistance  Bed to Chair: Stand by assistance, Ambulation 1  Surface: carpet  Device: Rolling Walker  Other Apparatus: O2  Assistance: Contact guard assistance  Quality of Gait: steppage gt to clear floor, mild trunk flexion, 1 standing rest break required to complete extended distance  Gait Deviations: Slow Lea, Increased JAMES  Distance: 80 feet with turns and standing rests  Comments: 96% SpO2 post gait training, Stairs  # Steps : 4  Stairs Height: 6\"  Rails: Bilateral  Device: No Device  Assistance: Contact guard assistance, Stand by assistance  Comment: Non reciprocal pattern Increased SOB SpO2 96% post stair negotiation    FIMS:  ,  , Assessment: Patient SOB with gait and stairs and requires extended seated rest break to continue with therapy session. Patient maintains 96% SpO2 throughout therapy session    Occupational therapy: FIMS:   ,  , Assessment: Pt is 79 y/o female presenting with the above deficits, resulting in increased dependence for safe competion of self care ADLs. Pt limited by weakness/decreased endurance and poor  strength for fine motor areas of care. Pt would benefit from OT to improve on condition to maximize safety and independence with functional transfers, mobility an self care.     Speech therapy: FIMS:        Lab/X-ray studies reviewed, analyzed and discussed with patient and staff:   Recent Results (from the past 24 hour(s))   COVID-19, Rapid    Collection Time: 02/23/21  2:50 PM    Specimen: Nasopharyngeal Swab; Nasal   Result Value Ref Range    SARS-CoV-2, NAAT Not Detected Not Detected   Basic Metabolic Panel    Collection Time: 02/24/21  5:56 AM   Result Value Ref Range    Sodium 138 135 - 144 mEq/L    Potassium 4.4 3.4 - 4.9 mEq/L Chloride 102 95 - 107 mEq/L    CO2 27 20 - 31 mEq/L    Anion Gap 9 9 - 15 mEq/L    Glucose 98 70 - 99 mg/dL    BUN 12 8 - 23 mg/dL    CREATININE 0.77 0.50 - 0.90 mg/dL    GFR Non-African American >60.0 >60    GFR  >60.0 >60    Calcium 9.0 8.5 - 9.9 mg/dL   CBC    Collection Time: 02/24/21  5:56 AM   Result Value Ref Range    WBC 8.3 4.8 - 10.8 K/uL    RBC 3.55 (L) 4.20 - 5.40 M/uL    Hemoglobin 10.2 (L) 12.0 - 16.0 g/dL    Hematocrit 30.9 (L) 37.0 - 47.0 %    MCV 86.9 82.0 - 100.0 fL    MCH 28.8 27.0 - 31.3 pg    MCHC 33.2 33.0 - 37.0 %    RDW 14.2 11.5 - 14.5 %    Platelets 336 038 - 089 K/uL       Previous extensive, complex labs, notes and diagnostics reviewed and analyzed. ALLERGIES:    Allergies as of 02/19/2021 - Review Complete 02/19/2021   Allergen Reaction Noted    Latex  07/19/2017    Tape [adhesive tape]  06/28/2016      (please also verify by checking MAR)       I reviewed her [facility] prescription monitoring service data sheets in hopes of eliminating polypharmacy and weaning to the lowest effective dose of pain medications and eliminating the concomitant use of benzodiazepines. I see no medications of concern. I see no habits of combining sedatives and narcotics. Complex Physical Medicine & Rehab Issues Assess & Plan:   1. Severe abnormality of gait and mobility and impaired self-care and ADL's secondary to progressive weakness dt   CHF . Functional and medical status reassessed regarding patients ability to participate in therapies and patient found to be able to participate in acute intensive comprehensive inpatient rehabilitation program including PT/OT to improve balance, ambulation, ADLs, and to improve the P/AROM. Therapeutic modifications regarding activities in therapies, place, amount of time per day and intensity of therapy made daily.   In bed therapies or bedside therapies prn.   2. Bowel progressive constipation, and Bladder dysfunction monitoring neurogenic bladder:  frequent toileting, ambulate to bathroom with assistance, check post void residuals. Check for C.difficile x1 if >2 loose stools in 24 hours, continue bowel & bladder program.  Monitor bowel and bladder function. Lactinex 2 PO every AC. MOM prn, Brown Bomb prn, Glycerin suppository prn, enema prn.  3. Severe lbp pain as well as generalized OA pain: reassess pain every shift and prior to and after each therapy session, give prn medications, modalities prn in therapy, Lidoderm, K-pad prn.   4. Skin healing and breakdown risk:  continue pressure relief program.  Daily skin exams and reports from nursing. 5. Severe fatigue due to nutritional and hydration deficiency: Add vitamin B12 vitamin D and CoQ10 continue to monitor I&Os, calorie counts prn, dietary consult prn. Add healthy HS snack. 6. Acute episodic insomnia with situational adjustment disorder:  prn Ambien, monitor for day time sedation. Add HS \"Tuck In\"  7. Falls risk elevated:  patient to use call light to get nursing assistance to get up, bed and chair alarm. 8. Elevated DVT risk: progressive activities in PT, continue prophylaxis PORTILLO hose, elevation and Xarelto will be on hold pending thoracic surgery. 9. Complex discharge planning: Discharge home alone 3/2/21 with help from her son and daughter as well as home health care. She is status post weekly team meeting every Monday to assess progress towards goals, discuss and address social, psychological and medical comorbidities and to address difficulties they may be having progressing in therapy. Patient and family education is in progress. The patient is to follow-up with their family physician after discharge. Complex Active General Medical Issues that complicate care Assess & Plan:     1. Principal Problem:    Impaired mobility and activities of daily living due to CHF ex, [facility] admit 2021  Active Problems:  1.    Paroxysmal atrial fibrillation,  Essential hypertension

## 2021-02-24 NOTE — PROGRESS NOTES
Physical Therapy Rehab Treatment Note  [facility]  [facility]       NAME: Carmella Paez  : 1932 (80 y.o.)  MRN: [id number]  CODE STATUS: Full Code    Date of Service: 2021  Chart Reviewed: Yes  Family / Caregiver Present: No  General Comment  Comments: agreeable to tx    Restrictions:  Restrictions/Precautions: Fall Risk       SUBJECTIVE: Subjective: \"my head hurts a little\"  Pain Screening  Patient Currently in Pain: Yes  Pre Treatment Pain Screening  Pain at present: 4  Scale Used: Faces    Post Treatment Pain Screenin  Pain Assessment  Pain Assessment: Faces  Pain Level: 4  Pain Type: Acute pain  Pain Location: Head  Pain Orientation: Anterior  Pain Descriptors: Headache  Pain Frequency: Intermittent  Dizzy with sitting to supine. Reported to nurse  OBJECTIVE:   Overall Orientation Status: Within Functional Limits           Bed mobility  Supine to Sit: Stand by assistance  Sit to Supine: Stand by assistance    Transfers  Sit to Stand: Stand by assistance  Stand to sit: Stand by assistance;Contact guard assistance  Comment: decreased safety with approach to chair. Ambulation  Ambulation?: Yes  More Ambulation?: No  Ambulation 1  Surface: carpet  Device: Rolling Walker  Other Apparatus: O2  Assistance: Contact guard assistance  Quality of Gait: flexed trunk, increased steppage to clear floor safely  Gait Deviations: Slow Lea; Increased JAMES  Distance: 80 feet  Comments: 88% post gt  97% sats at rest on 3L02  Stairs/Curb  Stairs?: No   seated rest needed to recover sats. ASSESSMENT/COMMENTS:pt expressed she was fatigued and asked if she was done for the day. Returned to bed to rest. Pt touched her catheter and pelvic region and made pulling motion. Told nurse that pt grabbing at pelvic region and catheter and that it seemed to be bothering pt. Pt states her headache is still there. PLAN OF CARE/Safety: ongoing.  Pt to have pulmonary  surgery tomorrow

## 2021-02-24 NOTE — PROGRESS NOTES
Patient resting comfortably in bed. Bed alarm is on, call light is within reach. Patient states she has a  Headache. Medication intervention given. Will continue to monitor.

## 2021-02-24 NOTE — PROGRESS NOTES
Pt for OR in AM for left Pleurx catheter insertion . Plan to return to rehab post op. NPO after midnight.

## 2021-02-24 NOTE — PROGRESS NOTES
Physical Therapy Rehab Treatment Note  [facility]  [facility]       NAME: Mana Grajeda  : 1932 (80 y.o.)  MRN: [id number]  CODE STATUS: Full Code    Date of Service: 2021  Chart Reviewed: Yes  Family / Caregiver Present: No  General Comment  Comments: \"my head hurts i want tylenol\"    Restrictions:  Restrictions/Precautions: Fall Risk       SUBJECTIVE: Subjective: nurse giving meds for nausea. Pain Screening  Patient Currently in Pain: Yes       Post Treatment Pain Screenin  Pain Assessment  Pain Assessment: Faces  Pain Level: 5  Pain Type: Acute pain  Pain Location: Head;Abdomen  Pain Orientation: Mid  Pain Descriptors: Headache  Pain Frequency: Intermittent    OBJECTIVE:   Overall Orientation Status: Within Functional Limits           Neuromuscular Education  Neuromuscular Comments: around bolsters with ww. pt very fatigued with all activity. Transfers  Sit to Stand: Stand by assistance  Stand to sit: Stand by assistance;Contact guard assistance  Comment: decreased safety with approach to chair. Ambulation  Ambulation?: Yes  More Ambulation?: No  Ambulation 1  Surface: carpet  Device: Rolling Walker  Other Apparatus: O2  Assistance: Contact guard assistance  Quality of Gait: flexed trunk, increased steppage to clear floor safely  Gait Deviations: Slow Lea; Increased JAMES  Distance: 40 feet x 2 with seated rest  Comments: 88% post gt  Additional gt 50 feet. Decreased safety with approach to wc. Pt very fatigued. Stairs/Curb  Stairs?: No     therex: longseated ball  laq and marches x 20                ASSESSMENT/COMMENTS:pt not feeling well today and c/o nausea and a headache. Pt with difficulty getting oxygen sats above 92% on 3L02. Pt seems fatigued today and with decreased energy. PLAN OF CARE/Safety: ongoing.          Therapy Time:   Individual   Time In 0900   Time Out 1000   Minutes 60     Minutes:60      Transfer/Bed mobility trainin Gait trainin      Neuro re education:0     Therapeutic ex:20      Beau Scale, PTA, 21 at 9:32 AM

## 2021-02-24 NOTE — PLAN OF CARE
Problem: Falls - Risk of:  Goal: Will remain free from falls  Description: Will remain free from falls  2/23/2021 2209 by Roro Tuttle RN  Outcome: Ongoing  2/23/2021 1846 by Misha Alanis RN  Outcome: Ongoing  Goal: Absence of physical injury  Description: Absence of physical injury  2/23/2021 2209 by Roro Tuttle RN  Outcome: Ongoing  2/23/2021 1846 by Misha Alanis RN  Outcome: Ongoing     Problem: Mobility - Impaired:  Goal: Mobility will improve  Description: Mobility will improve  2/23/2021 2209 by Roro Tuttle RN  Outcome: Ongoing  2/23/2021 1846 by Misha Alanis RN  Outcome: Ongoing     Problem: Skin Integrity:  Goal: Will show no infection signs and symptoms  Description: Will show no infection signs and symptoms  2/23/2021 2209 by Roro Tuttle RN  Outcome: Ongoing  2/23/2021 1846 by Misha Alanis RN  Outcome: Ongoing  Goal: Absence of new skin breakdown  Description: Absence of new skin breakdown  2/23/2021 2209 by Roro Tuttle RN  Outcome: Ongoing  2/23/2021 1846 by Misha Alanis RN  Outcome: Ongoing

## 2021-02-25 ENCOUNTER — ANESTHESIA (OUTPATIENT)
Dept: OPERATING ROOM | Age: 86
End: 2021-02-25

## 2021-02-25 ENCOUNTER — APPOINTMENT (OUTPATIENT)
Dept: GENERAL RADIOLOGY | Age: 86
DRG: 091 | End: 2021-02-25
Attending: PHYSICAL MEDICINE & REHABILITATION
Payer: MEDICARE

## 2021-02-25 VITALS — SYSTOLIC BLOOD PRESSURE: 105 MMHG | DIASTOLIC BLOOD PRESSURE: 59 MMHG | OXYGEN SATURATION: 96 %

## 2021-02-25 LAB
ANION GAP SERPL CALCULATED.3IONS-SCNC: 7 MEQ/L (ref 9–15)
BUN BLDV-MCNC: 13 MG/DL (ref 8–23)
CALCIUM SERPL-MCNC: 8.8 MG/DL (ref 8.5–9.9)
CHLORIDE BLD-SCNC: 103 MEQ/L (ref 95–107)
CO2: 28 MEQ/L (ref 20–31)
CREAT SERPL-MCNC: 0.78 MG/DL (ref 0.5–0.9)
GFR AFRICAN AMERICAN: >60
GFR NON-AFRICAN AMERICAN: >60
GLUCOSE BLD-MCNC: 94 MG/DL (ref 70–99)
HCT VFR BLD CALC: 29 % (ref 37–47)
HEMOGLOBIN: 9.8 G/DL (ref 12–16)
MCH RBC QN AUTO: 29.4 PG (ref 27–31.3)
MCHC RBC AUTO-ENTMCNC: 33.8 % (ref 33–37)
MCV RBC AUTO: 87.1 FL (ref 82–100)
PDW BLD-RTO: 14.7 % (ref 11.5–14.5)
PLATELET # BLD: 263 K/UL (ref 130–400)
POTASSIUM SERPL-SCNC: 4.4 MEQ/L (ref 3.4–4.9)
RBC # BLD: 3.33 M/UL (ref 4.2–5.4)
SODIUM BLD-SCNC: 138 MEQ/L (ref 135–144)
WBC # BLD: 7.8 K/UL (ref 4.8–10.8)

## 2021-02-25 PROCEDURE — 2709999900 HC NON-CHARGEABLE SUPPLY: Performed by: THORACIC SURGERY (CARDIOTHORACIC VASCULAR SURGERY)

## 2021-02-25 PROCEDURE — 6370000000 HC RX 637 (ALT 250 FOR IP): Performed by: ANESTHESIOLOGY

## 2021-02-25 PROCEDURE — 6360000002 HC RX W HCPCS: Performed by: THORACIC SURGERY (CARDIOTHORACIC VASCULAR SURGERY)

## 2021-02-25 PROCEDURE — 2580000003 HC RX 258: Performed by: THORACIC SURGERY (CARDIOTHORACIC VASCULAR SURGERY)

## 2021-02-25 PROCEDURE — 6370000000 HC RX 637 (ALT 250 FOR IP): Performed by: INTERNAL MEDICINE

## 2021-02-25 PROCEDURE — 94761 N-INVAS EAR/PLS OXIMETRY MLT: CPT

## 2021-02-25 PROCEDURE — 85027 COMPLETE CBC AUTOMATED: CPT

## 2021-02-25 PROCEDURE — 3600000003 HC SURGERY LEVEL 3 BASE: Performed by: THORACIC SURGERY (CARDIOTHORACIC VASCULAR SURGERY)

## 2021-02-25 PROCEDURE — 36415 COLL VENOUS BLD VENIPUNCTURE: CPT

## 2021-02-25 PROCEDURE — 2580000003 HC RX 258

## 2021-02-25 PROCEDURE — 6370000000 HC RX 637 (ALT 250 FOR IP): Performed by: PHYSICAL MEDICINE & REHABILITATION

## 2021-02-25 PROCEDURE — 88341 IMHCHEM/IMCYTCHM EA ADD ANTB: CPT

## 2021-02-25 PROCEDURE — 2700000000 HC OXYGEN THERAPY PER DAY

## 2021-02-25 PROCEDURE — 32550 INSERT PLEURAL CATH: CPT | Performed by: THORACIC SURGERY (CARDIOTHORACIC VASCULAR SURGERY)

## 2021-02-25 PROCEDURE — 7100000001 HC PACU RECOVERY - ADDTL 15 MIN: Performed by: THORACIC SURGERY (CARDIOTHORACIC VASCULAR SURGERY)

## 2021-02-25 PROCEDURE — C1729 CATH, DRAINAGE: HCPCS | Performed by: THORACIC SURGERY (CARDIOTHORACIC VASCULAR SURGERY)

## 2021-02-25 PROCEDURE — 99233 SBSQ HOSP IP/OBS HIGH 50: CPT | Performed by: PHYSICAL MEDICINE & REHABILITATION

## 2021-02-25 PROCEDURE — 1180000000 HC REHAB R&B

## 2021-02-25 PROCEDURE — 71045 X-RAY EXAM CHEST 1 VIEW: CPT

## 2021-02-25 PROCEDURE — 6360000002 HC RX W HCPCS

## 2021-02-25 PROCEDURE — 7100000000 HC PACU RECOVERY - FIRST 15 MIN: Performed by: THORACIC SURGERY (CARDIOTHORACIC VASCULAR SURGERY)

## 2021-02-25 PROCEDURE — 88305 TISSUE EXAM BY PATHOLOGIST: CPT

## 2021-02-25 PROCEDURE — 3700000001 HC ADD 15 MINUTES (ANESTHESIA): Performed by: THORACIC SURGERY (CARDIOTHORACIC VASCULAR SURGERY)

## 2021-02-25 PROCEDURE — 3600000013 HC SURGERY LEVEL 3 ADDTL 15MIN: Performed by: THORACIC SURGERY (CARDIOTHORACIC VASCULAR SURGERY)

## 2021-02-25 PROCEDURE — 88342 IMHCHEM/IMCYTCHM 1ST ANTB: CPT

## 2021-02-25 PROCEDURE — 88112 CYTOPATH CELL ENHANCE TECH: CPT

## 2021-02-25 PROCEDURE — 94640 AIRWAY INHALATION TREATMENT: CPT

## 2021-02-25 PROCEDURE — 3700000000 HC ANESTHESIA ATTENDED CARE: Performed by: THORACIC SURGERY (CARDIOTHORACIC VASCULAR SURGERY)

## 2021-02-25 PROCEDURE — 0W9B30Z DRAINAGE OF LEFT PLEURAL CAVITY WITH DRAINAGE DEVICE, PERCUTANEOUS APPROACH: ICD-10-PCS | Performed by: PHYSICAL MEDICINE & REHABILITATION

## 2021-02-25 PROCEDURE — 80048 BASIC METABOLIC PNL TOTAL CA: CPT

## 2021-02-25 RX ORDER — ACETAMINOPHEN 500 MG
1000 TABLET ORAL ONCE
Status: COMPLETED | OUTPATIENT
Start: 2021-02-25 | End: 2021-02-25

## 2021-02-25 RX ORDER — LIDOCAINE HYDROCHLORIDE 20 MG/ML
INJECTION, SOLUTION INTRAVENOUS PRN
Status: DISCONTINUED | OUTPATIENT
Start: 2021-02-25 | End: 2021-02-25 | Stop reason: SDUPTHER

## 2021-02-25 RX ORDER — SODIUM CHLORIDE, SODIUM LACTATE, POTASSIUM CHLORIDE, CALCIUM CHLORIDE 600; 310; 30; 20 MG/100ML; MG/100ML; MG/100ML; MG/100ML
INJECTION, SOLUTION INTRAVENOUS
Status: COMPLETED
Start: 2021-02-25 | End: 2021-02-25

## 2021-02-25 RX ORDER — MAGNESIUM HYDROXIDE 1200 MG/15ML
LIQUID ORAL CONTINUOUS PRN
Status: COMPLETED | OUTPATIENT
Start: 2021-02-25 | End: 2021-02-25

## 2021-02-25 RX ORDER — FENTANYL CITRATE 50 UG/ML
25 INJECTION, SOLUTION INTRAMUSCULAR; INTRAVENOUS EVERY 5 MIN PRN
Status: DISCONTINUED | OUTPATIENT
Start: 2021-02-25 | End: 2021-03-01

## 2021-02-25 RX ORDER — CEFAZOLIN SODIUM 2 G/50ML
2000 SOLUTION INTRAVENOUS
Status: COMPLETED | OUTPATIENT
Start: 2021-02-25 | End: 2021-02-25

## 2021-02-25 RX ORDER — SODIUM CHLORIDE, SODIUM LACTATE, POTASSIUM CHLORIDE, CALCIUM CHLORIDE 600; 310; 30; 20 MG/100ML; MG/100ML; MG/100ML; MG/100ML
INJECTION, SOLUTION INTRAVENOUS CONTINUOUS
Status: DISCONTINUED | OUTPATIENT
Start: 2021-02-25 | End: 2021-03-01

## 2021-02-25 RX ORDER — ONDANSETRON 2 MG/ML
4 INJECTION INTRAMUSCULAR; INTRAVENOUS
Status: ACTIVE | OUTPATIENT
Start: 2021-02-25 | End: 2021-02-25

## 2021-02-25 RX ORDER — 0.9 % SODIUM CHLORIDE 0.9 %
500 INTRAVENOUS SOLUTION INTRAVENOUS
Status: ACTIVE | OUTPATIENT
Start: 2021-02-25 | End: 2021-02-25

## 2021-02-25 RX ORDER — PROPOFOL 10 MG/ML
INJECTION, EMULSION INTRAVENOUS CONTINUOUS PRN
Status: DISCONTINUED | OUTPATIENT
Start: 2021-02-25 | End: 2021-02-25 | Stop reason: SDUPTHER

## 2021-02-25 RX ADMIN — CITALOPRAM HYDROBROMIDE 20 MG: 20 TABLET ORAL at 11:17

## 2021-02-25 RX ADMIN — PANTOPRAZOLE SODIUM 40 MG: 40 TABLET, DELAYED RELEASE ORAL at 06:33

## 2021-02-25 RX ADMIN — LEVOTHYROXINE SODIUM 88 MCG: 88 TABLET ORAL at 06:33

## 2021-02-25 RX ADMIN — PROPOFOL 75 MCG/KG/MIN: 10 INJECTION, EMULSION INTRAVENOUS at 08:35

## 2021-02-25 RX ADMIN — ACETAMINOPHEN 1000 MG: 500 TABLET ORAL at 10:25

## 2021-02-25 RX ADMIN — CARVEDILOL 3.12 MG: 3.12 TABLET, FILM COATED ORAL at 22:52

## 2021-02-25 RX ADMIN — LACTOBACILLUS TAB 2 TABLET: TAB at 15:22

## 2021-02-25 RX ADMIN — LACTOBACILLUS TAB 2 TABLET: TAB at 11:16

## 2021-02-25 RX ADMIN — MIDODRINE HYDROCHLORIDE 5 MG: 5 TABLET ORAL at 16:45

## 2021-02-25 RX ADMIN — MIDODRINE HYDROCHLORIDE 5 MG: 5 TABLET ORAL at 11:16

## 2021-02-25 RX ADMIN — PANTOPRAZOLE SODIUM 40 MG: 40 TABLET, DELAYED RELEASE ORAL at 16:45

## 2021-02-25 RX ADMIN — BUDESONIDE AND FORMOTEROL FUMARATE DIHYDRATE 2 PUFF: 160; 4.5 AEROSOL RESPIRATORY (INHALATION) at 16:19

## 2021-02-25 RX ADMIN — SULFAMETHOXAZOLE AND TRIMETHOPRIM 1 TABLET: 800; 160 TABLET ORAL at 11:16

## 2021-02-25 RX ADMIN — SODIUM CHLORIDE, POTASSIUM CHLORIDE, SODIUM LACTATE AND CALCIUM CHLORIDE 1000 ML: 600; 310; 30; 20 INJECTION, SOLUTION INTRAVENOUS at 07:29

## 2021-02-25 RX ADMIN — CYANOCOBALAMIN TAB 500 MCG 1000 MCG: 500 TAB at 11:17

## 2021-02-25 RX ADMIN — LACTOBACILLUS TAB 2 TABLET: TAB at 22:52

## 2021-02-25 RX ADMIN — FERROUS SULFATE TAB 325 MG (65 MG ELEMENTAL FE) 325 MG: 325 (65 FE) TAB at 11:17

## 2021-02-25 RX ADMIN — DIGOXIN 125 MCG: 125 TABLET ORAL at 11:17

## 2021-02-25 RX ADMIN — TETRAHYDROZOLINE HCL 1 DROP: 0.05 SOLUTION/ DROPS OPHTHALMIC at 15:22

## 2021-02-25 RX ADMIN — FUROSEMIDE 20 MG: 20 TABLET ORAL at 11:17

## 2021-02-25 RX ADMIN — POTASSIUM CHLORIDE 20 MEQ: 20 TABLET, EXTENDED RELEASE ORAL at 11:18

## 2021-02-25 RX ADMIN — FERROUS SULFATE TAB 325 MG (65 MG ELEMENTAL FE) 325 MG: 325 (65 FE) TAB at 16:45

## 2021-02-25 RX ADMIN — CARVEDILOL 3.12 MG: 3.12 TABLET, FILM COATED ORAL at 11:18

## 2021-02-25 RX ADMIN — POLYETHYLENE GLYCOL 3350 17 G: 17 POWDER, FOR SOLUTION ORAL at 11:17

## 2021-02-25 RX ADMIN — LIDOCAINE HYDROCHLORIDE 40 MG: 20 INJECTION, SOLUTION INTRAVENOUS at 08:35

## 2021-02-25 RX ADMIN — ASPIRIN 81 MG: 81 TABLET, CHEWABLE ORAL at 11:18

## 2021-02-25 RX ADMIN — TETRAHYDROZOLINE HCL 1 DROP: 0.05 SOLUTION/ DROPS OPHTHALMIC at 22:52

## 2021-02-25 RX ADMIN — SULFAMETHOXAZOLE AND TRIMETHOPRIM 1 TABLET: 800; 160 TABLET ORAL at 16:45

## 2021-02-25 RX ADMIN — CEFAZOLIN SODIUM 2 G: 2 SOLUTION INTRAVENOUS at 08:38

## 2021-02-25 ASSESSMENT — PULMONARY FUNCTION TESTS
PIF_VALUE: 1
PIF_VALUE: 0
PIF_VALUE: 1
PIF_VALUE: 0
PIF_VALUE: 1
PIF_VALUE: 1

## 2021-02-25 NOTE — ANESTHESIA POSTPROCEDURE EVALUATION
Department of Anesthesiology  Postprocedure Note    Patient: Radha Larsen  MRN: [id number]  YOB: 1932  Date of evaluation: 2/25/2021  Time:  9:09 AM     Procedure Summary     Date: 02/25/21 Room / Location: [facility] / [facility]    Anesthesia Start: 5563 Anesthesia Stop: 4216    Procedure: LEFT PLEURAL CATHETER INSERTION (Left Chest) Diagnosis: (CATHETER)    Surgeons: Fredi Figueroa MD Responsible Provider: Jean Rayo MD    Anesthesia Type: MAC ASA Status: 3          Anesthesia Type: MAC    Josh Phase I:      Josh Phase II:      Last vitals: Reviewed and per EMR flowsheets.        Anesthesia Post Evaluation    Patient location during evaluation: PACU  Patient participation: complete - patient participated  Level of consciousness: awake and awake and alert  Pain score: 0  Airway patency: patent  Nausea & Vomiting: no nausea and no vomiting  Complications: no  Cardiovascular status: blood pressure returned to baseline and hemodynamically stable  Respiratory status: acceptable and face mask  Hydration status: euvolemic

## 2021-02-25 NOTE — PLAN OF CARE
Problem: Falls - Risk of:  Goal: Will remain free from falls  Description: Will remain free from falls  2/25/2021 1413 by Marianela Miranda RN  Outcome: Ongoing  2/25/2021 0237 by Haseeb Hogan RN  Outcome: Ongoing  Goal: Absence of physical injury  Description: Absence of physical injury  2/25/2021 1413 by Marianela Miranda RN  Outcome: Ongoing  2/25/2021 0237 by Haseeb Hogan RN  Outcome: Ongoing     Problem: Mobility - Impaired:  Goal: Mobility will improve  Description: Mobility will improve  2/25/2021 1413 by Marianela Miranda RN  Outcome: Ongoing  2/25/2021 0237 by Haseeb Hogan RN  Outcome: Ongoing     Problem: Skin Integrity:  Goal: Will show no infection signs and symptoms  Description: Will show no infection signs and symptoms  2/25/2021 1413 by Marianela Miranda RN  Outcome: Ongoing  2/25/2021 0237 by Haseeb Hogan RN  Outcome: Ongoing  Goal: Absence of new skin breakdown  Description: Absence of new skin breakdown  2/25/2021 1413 by Marianela Miranda RN  Outcome: Ongoing  2/25/2021 0237 by Haseeb Hogan RN  Outcome: Ongoing     Problem: Pain:  Goal: Pain level will decrease  Description: Pain level will decrease  2/25/2021 1413 by Marianela Miranda RN  Outcome: Ongoing  2/25/2021 0237 by Haseeb Hogan RN  Outcome: Ongoing  Goal: Control of acute pain  Description: Control of acute pain  2/25/2021 1413 by Marianela Miranda RN  Outcome: Ongoing  2/25/2021 0237 by Haseeb Hgoan RN  Outcome: Ongoing  Goal: Control of chronic pain  Description: Control of chronic pain  2/25/2021 1413 by Marianela Miranda RN  Outcome: Ongoing  2/25/2021 0237 by Haseeb Hogan RN  Outcome: Ongoing

## 2021-02-25 NOTE — PROGRESS NOTES
Pt is alert and oriented x 3. Denies any pain. Generalized weakness. 3L O2 via NC. SOB with ambulation. Pt is scheduled for OR in AM for left Pleurx catheter insertion with Dr. Nicole Serna. NPO after midnight. Sips of Water with Meds allowed. Fall precautions in place with bed alarm on. UTI- no adverse reactions noted to Bactrim. Taylor catheter in place--taylor care done-yellow, cloudy, malodorous urine. Pt walks with a walker. Continent of bowel. LBM 2/24/21. Pt slept well.  Electronically signed by Karl Vela RN on 2/25/2021 at 4:25 AM

## 2021-02-25 NOTE — PROGRESS NOTES
Pt sitting at bed side dangling, inspected pt bedding for bleeding as was reported by therapy. No active bleeding or S/S of distress. Pt skin is pink from procedure prep. Dressing is clean dry and intact w/no drainage at this time. Call light is within reach at this time.  Electronically signed by Lydia Pratt RN on 2/25/2021 at 4:48 PM

## 2021-02-25 NOTE — PROGRESS NOTES
Hospitalist Progress Note  2/25/2021 5:59 PM    Assessment and Plan:   1. Generalized weakness, Gait instability and Decreased Functional Status secondary to recurrent pleural effuson: Underwent diagnostic and therapeutic thoracentesis (X2) on 2/16/2021 720ml removed. Fluids shows lymphocyte predominant pleural effusion. Thoracoscopic biopsy not recommended given age and poor health. Pleurx cath placed today by thoracic surgery. Continue supplemental oxygen to maintain sats 90-92%. IS and acapella. Fall precautions. PT OT to evaluate. Maximize nutrition status. Assessing if needs DME at home. SW on board. 2. UTI in the setting of indwelling Taylor catheter:  Primary team initiated Bactrim on the 21st.   3. PAF/HTN/CHF (combined): Now in NSR. Cardiology managing. EF 40%. Previous cath. On xarelto, coreg, digoxin, lasix. Daily EKGs  4. Hypothyroidism: Continue synthroid  5. Urinary retention: taylor catheter in place. Monitor intake and output  6. GERD: Protonix daily  7. Hypotension: Improved with midodrine. Continue low-dose Coreg with parameters in place  8. HX provoked DVT: On xarelto  9. HX SVT: s/p ablation  10. Bowel Regimen and GI PPx: stool softners PRN ordered with hold parameters for loose stools or diarrhea. On antiacid  11. Diet: DIET GENERAL; Low Sodium (2 GM)  12. Advance Directive: Full Code   13. Nutrition status:Supplemental Vitamins ordered. Dietitian assessment  14. Vaccinations: Immunization records reviewed. If has not received appropriate vaccinations, will order to be given prior to discharge. 15. DVT prophylaxis: On xarelto  16. Discharge planning: SW on board. 17. High Risk Readmission Screening Tool Score Noted.      Additionally, the following hospital problems were addressed:  Principal Problem:    Impaired mobility and activities of daily living due to CHF exac, [facility] re-admit 2/19/2021  Active Problems:    Paroxysmal atrial fibrillation (HCC)    Lumbar stenosis with neurogenic claudication    Osteoporosis    Charcot Sagar Pagoda Tooth muscular atrophy    Myelopathy (HCC)    Dizziness    Essential hypertension    Pleural effusion    Impaired mobility    Acute cystitis with hematuria  Resolved Problems:    * No resolved hospital problems. *      ** Total time spent reviewing medical records, evaluating patient, speaking with RN's and consultants where I was focused exclusively on this patient: 35 minutes. This time is excluding time spent performing procedures or significant events occurring earlier or later in the day requiring my attention and focus. Subjective:   Admit Date: 2/19/2021  PCP: Santosh Arreola MD    Pleurx catheter inserted today with cardiothoracic surgery. Patient tolerated procedure well. Resting in room. Afebrile Telemetry reviewed. No new complaints. Pt denies chest pain, SOB, N/V, fevers or chills. Objective:     Vitals:    02/25/21 0915 02/25/21 0920 02/25/21 1020 02/25/21 1102   BP: (!) 116/56 122/63  123/70   Pulse: 64 61 59 65   Resp: 10 14 (!) 5 17   Temp:    98 °F (36.7 °C)   TempSrc:    Oral   SpO2: 94% 93% 96% 91%   Weight:       Height:         General appearance: No acute distress, A+ O x 1-2. No  conversational dyspnea noted. Dentition intact. Answers questions appropriately  Lungs:  Diminished,  no exp wheezes, No rales No retractions; No use of accessory muscles  Heart:  S1, S2 normal, RRR, no MRG appreciated  Abdomen: (+) BS, soft, non-tender; non distended no guarding or rigidity. Extremities:  no cyanosis, trace edema bilat lower exts, no calf tenderness bilaterally.  Dry skin noted       Medications:      lactated ringers 1,000 mL (02/25/21 0729)      [START ON 2/26/2021] rivaroxaban  15 mg Oral Daily    tetrahydrozoline  1 drop Both Eyes TID    polyethylene glycol  17 g Oral Daily    ferrous sulfate  325 mg Oral BID     sulfamethoxazole-trimethoprim  1 tablet Oral BID     lactobacillus acidophilus  2 tablet Oral TID   [facility] aspirin  81 mg Oral Daily    budesonide-formoterol  2 puff Inhalation BID    carvedilol  3.125 mg Oral BID    citalopram  20 mg Oral Daily    digoxin  125 mcg Oral Daily    furosemide  20 mg Oral Daily    levothyroxine  88 mcg Oral Daily    midodrine  5 mg Oral TID WC    pantoprazole  40 mg Oral BID AC    potassium chloride  20 mEq Oral Daily with breakfast    vitamin B-12  1,000 mcg Oral Daily       LABS Reviewed    IMAGING Reviewed    Laura Salas CNP  Rounding Hospitalist

## 2021-02-25 NOTE — PROGRESS NOTES
Assessment completed. Medicated with Tylenol for 510 headache. Medicated with PRN Phenergan for c/o nausea. Attempted to give pt PRN Miralax but pt refused d/t stating she already went in the morning. Zeng catheter patent draining clear suzanna urine. Pt to be NPO after midnight d/t having left Pleurx drain being placed tomorrow morning 2/25/2021 by Dr Anayeli Landaverde. Up in chair with alarm activated. Call light in reach.  Electronically signed by Ash Le LPN on 4/44/0845 at 5:12 PM

## 2021-02-25 NOTE — PROGRESS NOTES
09:05 Rec'd to PACU from OR awake alert, monitor leads applied rec'd report and pt assessed. 09:15 Mainly Italian speaking able to communicate her needs adequately. VSS.    09:30 Chest x-ray at bedside, Dr. Sudhir Morales at bedside to review x-ray. 09:45 VSS. 10:10 Report to Rehab RN. 10:25 Medicated with tylenol for C/O pain/discomfort.

## 2021-02-25 NOTE — PROGRESS NOTES
Pt arrived to room and updated pt family member.  Electronically signed by Alem Vasquez RN on 2/25/2021 at 10:58 AM

## 2021-02-25 NOTE — PROGRESS NOTES
Physical Therapy Missed Treatment   Facility/Department: [facility] MED SURG D399/B954-10    NAME: Saravanan Gottlieb  Patient Status:   : 1932 (80 y.o.)  MRN: [id number]  Account: [de-identified]  Gender: female        [] Patient Declines PT Treatment            [x] Patient Unavailable:     Pt on hold today due to having pulmonary procedure. Pt missed 90 minutes of PT this date. Will attempt PT Treatment again at earliest convenience.         Electronically signed by Sheryl Trujillo PTA on 21 at 3:02 PM EST

## 2021-02-25 NOTE — PROGRESS NOTES
Subjective: The patient complains of  moderate to severe acute    CHF partially relieved by rest, PT, OT,   and exacerbated by recent illness and exertion. I am concerned about patients  frailty and bleeding risk on Xarelto. Her Xarelto has been on hold and now she is ready for her surgery. We rechecked her COVID-19 and it is still negative fortunately. According to recent sick surgery note, \" Pt is alert and oriented x 3. Denies any pain. Generalized weakness. 3L O2 via NC. SOB with ambulation. Pt is scheduled for OR in AM for left Pleurx catheter insertion with Dr. Dre Kaba. NPO after midnight. Sips of Water with Meds allowed. Fall precautions in place with bed alarm on. UTI- no adverse reactions noted to Bactrim. Taylor catheter in place--taylor care done-yellow, cloudy, malodorous urine. Pt walks with a walker. Continent of bowel. LBM 2/24/21. Pt slept well. \"    ROS x10: The patient also complains of severely impaired mobility and activities of daily living. Otherwise no new problems with vision, hearing, nose, mouth, throat, dermal, cardiovascular, GI, , pulmonary, musculoskeletal, psychiatric or neurological. See Rehab H&P on Rehab chart dated . Vital signs:  BP (!) 153/81   Pulse 67   Temp 97.7 °F (36.5 °C) (Temporal)   Resp 20   Ht 5' 3\" (1.6 m)   Wt 159 lb 2 oz (72.2 kg)   SpO2 94%   BMI 28.19 kg/m²   I/O:   PO/Intake:  fair PO intake, no problems observed or reported. Bowel/Bladder:   UTI Bactrim, constipation  ,taylor for retention  General:  Patient is well developed, adequately nourished, non-obese and     well kempt. HEENT:    PERRLA, hearing intact to loud voice, external inspection of ear     and nose benign. Inspection of lips, tongue and gums benign  Musculoskeletal: No significant change in strength or tone. All joints stable. Inspection and palpation of digits and nails show no clubbing,       cyanosis or inflammatory conditions.    Neuro/Psychiatric: Affect: flat.  Alert and oriented to person, place and     situation. No significant change in deep tendon reflexes or     sensation  Lungs:  Diminished, CTA-B. Respiration effort is normal at rest.     Heart:   S1 = S2, RRR. No loud murmurs. Abdomen:  Soft, non-tender, no enlargement of liver or spleen. Extremities:  No significant lower extremity edema or tenderness. Skin:   Intact to general survey, no visualized or palpated problems. Rehabilitation:  Physical therapy: FIMS:  Bed Mobility: Scooting: Minimal assistance    Transfers: Sit to Stand: Stand by assistance  Stand to sit: Stand by assistance, Contact guard assistance  Bed to Chair: Stand by assistance, Ambulation 1  Surface: carpet  Device: Rolling Walker  Other Apparatus: O2  Assistance: Contact guard assistance  Quality of Gait: flexed trunk, increased steppage to clear floor safely  Gait Deviations: Slow Lea, Increased JAMES  Distance: 40 feet x 2 with seated rest  Comments: 88% post gt, Stairs  # Steps : 4  Stairs Height: 6\"  Rails: Bilateral  Device: No Device  Assistance: Contact guard assistance, Stand by assistance  Comment: Non reciprocal pattern Increased SOB SpO2 96% post stair negotiation    FIMS:  ,  , Assessment: Patient SOB with gait and stairs and requires extended seated rest break to continue with therapy session. Patient maintains 96% SpO2 throughout therapy session    Occupational therapy: FIMS:   ,  , Assessment: Pt is 79 y/o female presenting with the above deficits, resulting in increased dependence for safe competion of self care ADLs. Pt limited by weakness/decreased endurance and poor  strength for fine motor areas of care. Pt would benefit from OT to improve on condition to maximize safety and independence with functional transfers, mobility an self care.     Speech therapy: FIMS:        Lab/X-ray studies reviewed, analyzed and discussed with patient and staff:   Recent Results (from the past 24 hour(s))   Basic Metabolic Panel    Collection Time: 02/25/21  5:30 AM   Result Value Ref Range    Sodium 138 135 - 144 mEq/L    Potassium 4.4 3.4 - 4.9 mEq/L    Chloride 103 95 - 107 mEq/L    CO2 28 20 - 31 mEq/L    Anion Gap 7 (L) 9 - 15 mEq/L    Glucose 94 70 - 99 mg/dL    BUN 13 8 - 23 mg/dL    CREATININE 0.78 0.50 - 0.90 mg/dL    GFR Non-African American >60.0 >60    GFR  >60.0 >60    Calcium 8.8 8.5 - 9.9 mg/dL   CBC    Collection Time: 02/25/21  5:30 AM   Result Value Ref Range    WBC 7.8 4.8 - 10.8 K/uL    RBC 3.33 (L) 4.20 - 5.40 M/uL    Hemoglobin 9.8 (L) 12.0 - 16.0 g/dL    Hematocrit 29.0 (L) 37.0 - 47.0 %    MCV 87.1 82.0 - 100.0 fL    MCH 29.4 27.0 - 31.3 pg    MCHC 33.8 33.0 - 37.0 %    RDW 14.7 (H) 11.5 - 14.5 %    Platelets 233 792 - 701 K/uL       Previous extensive, complex labs, notes and diagnostics reviewed and analyzed. ALLERGIES:    Allergies as of 02/19/2021 - Review Complete 02/19/2021   Allergen Reaction Noted    Latex  07/19/2017    Tape [adhesive tape]  06/28/2016      (please also verify by checking MAR)      Today I evaluated this patient for periodic reassessment of medical and functional status. The patient was discussed in detail at the treatment team meeting focusing on current medical issues, progress in therapies, social issues, psychological issues, barriers to progress and strategies to address these barriers, and discharge planning. See the addendum to rehab progress note-as a second progress note in the chart. The patient continues to be high risk for future disability and their medical and rehabilitation prognosis continue to be good and therefore, we will continue the patient's rehabilitation course as planned. The patient's tentative discharge date was set. Patient and family education was discussed. The patient was made aware of the team discussion regarding their progress. Complex Physical Medicine & Rehab Issues Assess & Plan:   1.  Severe abnormality of gait and mobility and impaired self-care and ADL's secondary to progressive weakness dt   CHF . Functional and medical status reassessed regarding patients ability to participate in therapies and patient found to be able to participate in acute intensive comprehensive inpatient rehabilitation program including PT/OT to improve balance, ambulation, ADLs, and to improve the P/AROM. Therapeutic modifications regarding activities in therapies, place, amount of time per day and intensity of therapy made daily. In bed therapies or bedside therapies prn.   2. Bowel progressive constipation, and Bladder dysfunction monitoring neurogenic bladder:  frequent toileting, ambulate to bathroom with assistance, check post void residuals. Check for C.difficile x1 if >2 loose stools in 24 hours, continue bowel & bladder program.  Monitor bowel and bladder function. Lactinex 2 PO every AC. MOM prn, Brown Bomb prn, Glycerin suppository prn, enema prn.  3. Severe lbp pain as well as generalized OA pain: reassess pain every shift and prior to and after each therapy session, give prn medications, modalities prn in therapy, Lidoderm, K-pad prn.   4. Skin healing and breakdown risk:  continue pressure relief program.  Daily skin exams and reports from nursing. 5. Severe fatigue due to nutritional and hydration deficiency: Add vitamin B12 vitamin D and CoQ10 continue to monitor I&Os, calorie counts prn, dietary consult prn. Add healthy HS snack. 6. Acute episodic insomnia with situational adjustment disorder:  prn Ambien, monitor for day time sedation. Add HS \"Tuck In\"  7. Falls risk elevated:  patient to use call light to get nursing assistance to get up, bed and chair alarm. 8. Elevated DVT risk: progressive activities in PT, continue prophylaxis PORTILLO hose, elevation and Xarelto will be on hold pending thoracic surgery.   9. Complex discharge planning: Discharge home alone 3/2/21 with help from her son and daughter as well as home health care.  She is status post weekly team meeting every Monday to assess progress towards goals, discuss and address social, psychological and medical comorbidities and to address difficulties they may be having progressing in therapy. Patient and family education is in progress. The patient is to follow-up with their family physician after discharge. Complex Active General Medical Issues that complicate care Assess & Plan:     1. Principal Problem:    Impaired mobility and activities of daily living due to CHF [facility] admit 2021  Active Problems:  1. Paroxysmal atrial fibrillation,  Essential hypertension with low blood pressures of late, recurrent pleural effusions acute on chronic combined systolic and diastolic CHF (congestive heart failure)-continue blood signs every shift focusing on heart rate and blood pressure checks, consult hospitalist for backup medical and adjust/add medications (aspirin, Xarelto, Coreg, Cardizem, Lasix, Nitrostat) plan as above consult cardiology titrate IV Lasix recheck chest x-ray wean high flow oxygen patient  requires a VATS procedure-scheduled for 2/25/2021. .  Midodrine titration add abdominal binders and teds  2. Acute severe UTI-Bactrim and monitor for residual urine in the bladder dose Bactrim with food add IM Rocephin check urine culture check postvoid residuals  3. Lumbar stenosis with neurogenic claudication  4. Charcot Arleen Tooth muscular atrophy, Osteoarthritis of lumbar spine with myelopathy,  Osteoarthritis  5. Severe depression and anxiety -emotional support provided daily, vitamin B12, encourage participation in rehabilitation support group and recreational therapy, adjust/add medications (Celexa Ativan)  6. Hypothyroidism-titrate Synthroid  7. Recurrent pleural effusion patient is going for- left Pleurx catheter insertion PA of 2/24/2021 with Dr. Tomy Cai. Plan to return to rehab post op. NPO after midnight. OR in AM for left Pleurx catheter insertion with Dr. Jose D.O., PM&R     Attending    [address]

## 2021-02-25 NOTE — OP NOTE
Operative Note      Patient: Junie Balderrama  YOB: 1932  MRN: [id number]    Date of Procedure: 2/25/2021    Pre-Op Diagnosis: Pleural effusion    Post-Op Diagnosis: Same       Procedure(s):  LEFT PLEURAL CATHETER INSERTION    Surgeon(s):  Talha Vivar MD    Assistant:   First Assistant: Dean Vitale    Anesthesia: Monitor Anesthesia Care with local  Estimated Blood Loss (mL): Minimal    Complications: None    Specimens:   ID Type Source Tests Collected by Time Destination   A : left lung pleural effusion Body Fluid Lung CYTOLOGY, NON-GYN Talha Vivar MD 2/25/2021 0854        Implants:  * No implants in log *      Drains:   Urethral Catheter (Active)   Catheter Indications Acute urinary retention/obstruction 02/24/21 2145   Securement Device Date Changed 02/23/21 02/23/21 0834   Site Assessment No urethral drainage 02/25/21 0424   Urine Color Yellow 02/25/21 0424   Urine Appearance Cloudy 02/25/21 0424   Urine Odor Malodorous 02/25/21 0424   Output (mL) 900 mL 02/25/21 0424       Ureteral Catheter 16 fr (Active)   Urine Color Yellow 02/18/21 1828   Urine Appearance Clear 02/18/21 1828   Output (ml) 650 ml 02/18/21 1828       [REMOVED] Urethral Catheter Latex 16 fr (Removed)   Catheter Indications Acute urinary retention/obstruction 02/05/21 0958   Site Assessment No urethral drainage 02/05/21 0958   Urine Color Nalini 02/05/21 0958   Urine Appearance Sediment 02/05/21 0958   Output (mL) 800 mL 02/05/21 0524       Findings: Pleural effusion    Detailed Description of Procedure:   Patient was in rehab was transferred back to the hospital with increasing shortness of breath and congestive heart failure. She was found to have a large left pleural effusion. 2 thoracenteses were performed and the fluid still recurred. A definitive drainage procedure was requested and given her age and comorbidities a Pleurx catheter was agreed upon.     Once patient was placed on the operative bed she was given gentle IV sedation by anesthesia. Her left chest was prepped and draped in sterile fashion. Using 8 cc of 1% lidocaine a tract of skin and subtenons tissue was anesthetized over her lower lateral rib cage. 2 small incisions were made 1 at either end of the tract. Using a finder needle we would get intermittent flushes of pleural effusion but no continuous flow where we could thread the peel-away sheath. And instead I then used a forceps to gently and bluntly dissect down into the pleural space until has met with a small rush of serous fluid. The Pleurx catheter was then tunneled from anterior to posterior. It was then placed within the pleural cavity without difficulty. It was secured to an atrium drainage canister and immediately began draining serosanguineous fluid. A specimen was sent for cytology. The drain was secured to the skin with a heavy silk suture and the posterior incision was closed with a subcuticular stitch.     Electronically signed by Carla Gilliam MD on 2/25/2021 at 8:59 AM

## 2021-02-25 NOTE — PROGRESS NOTES
8ml of 1% lidocaine Exp. 8/22 injected at sx site by Dr. Latesha Cook. Lidocaine came in the catheter it and is already charged for.

## 2021-02-25 NOTE — DISCHARGE INSTR - COC
Continuity of Care Form    Patient Name: Jose L Sepulveda   :  1932  MRN:  [id number]    Admit date:  2021  Discharge date:  21      Code Status Order: Full Code   Advance Directives:   [facility] Documentation     Date/Time Healthcare Directive Type of Healthcare Directive Copy in [address] [facility] [address] Agent's Name Healthcare Agent's Phone Number    21 [phone]  Yes, patient has an advance directive for healthcare treatment  Durable power of  for health care  No, copy requested from family  Adult [facility] National  [phone]          Admitting Physician:  Jie Pritchard DO  PCP: Santosh Arreola MD    Discharging Nurse: Electronically signed by Howie Mccall RN on 3/2/2021 at [address] [place] [address] Unit/Room#: B039/G664-70  Discharging Unit Phone Number: [phone]    Emergency Contact:   Extended Emergency Contact Information  Primary Emergency Contact: [place] [address] Phone: [phone]  Relation: Child  Secondary Emergency Contact: [address] [place] [address]  Mobile Phone: [phone]  Relation: Child    Past Surgical History:  Past Surgical History:   Procedure Laterality Date    JOINT REPLACEMENT Bilateral     knees    THORACENTESIS Left 2021    total of 755 cc removed per Dr Db Cope specimen sent to lab       Immunization History: There is no immunization history on file for this patient.     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    Osteoarthritis M19.90    Myelopathy ([address] G95.9    Impaired mobility and activities of daily living due to CHF exac, [facility] re-admit 2021 Z74.09, Z78.9    Headache R51.9    Depression F32.9    Ascending aortic aneurysm ([address] I71.2    Descending aortic aneurysm (HCC) I71.9    Dizziness R42    Shortness of breath R06.02    Essential hypertension I10    Acute on chronic combined systolic and diastolic CHF (congestive heart failure) (HCC) I50.43    Gait abnormality R26.9    Paroxysmal atrial fibrillation (HCC) I48.0    Pleural effusion J90    Hypoxia R09.02    Acute pulmonary edema (HCC) J81.0    Acute on chronic combined systolic (congestive) and diastolic (congestive) heart failure (HCC) I50.43    Impaired mobility Z74.09    Acute cystitis with hematuria N30.01       Isolation/Infection:   Isolation          No Isolation        Patient Infection Status     Infection Onset Added Last Indicated Last Indicated By Review Planned Expiration Resolved Resolved By    None active    Resolved    COVID-19 Rule Out 02/19/21 02/19/21 02/19/21 COVID-19, Rapid (Ordered)   02/19/21 Rule-Out Test Resulted    COVID-19 Rule Out 02/10/21 02/10/21 02/10/21 COVID-19 (Ordered)   02/10/21 Rule-Out Test Resulted    COVID-19 Rule Out 02/09/21 02/09/21 02/09/21 COVID-19 (Ordered)   02/09/21 Rule-Out Test Resulted    COVID-19 Rule Out 02/02/21 02/02/21 02/02/21 COVID-19 (Ordered)   02/02/21 Rule-Out Test Resulted    COVID-19 Rule Out 01/25/21 01/25/21 01/26/21 COVID-19 (Ordered)   01/26/21 Rule-Out Test Resulted    COVID-19 Rule Out 01/25/21 01/25/21 01/25/21 COVID-19 (Ordered)   01/25/21 Rule-Out Test Resulted          Nurse Assessment:  Last Vital Signs: /63   Pulse 61   Temp 97.7 °F (36.5 °C) (Temporal)   Resp 14   Ht 5' 3\" (1.6 m)   Wt 159 lb 2 oz (72.2 kg)   SpO2 93%   BMI 28.19 kg/m²     Last documented pain score (0-10 scale): Pain Level: 0  Last Weight:   Wt Readings from Last 1 Encounters:   02/25/21 159 lb 2 oz (72.2 kg)     Mental Status:  oriented, alert, coherent, logical, thought processes intact and able to concentrate and follow conversation    IV Access:  - None    Nursing Mobility/ADLs:  Walking Independent  Transfer  Independent  Bathing  Assisted  Dressing  Assisted  Toileting  Independent  Feeding  Independent  Med [address]  Assisted  Med Delivery   whole    Wound Care Documentation and Therapy:        Elimination:  Continence:   · Bowel: Yes  · Bladder: Yes  Urinary Catheter: Removal Date 3/1/2021   Colostomy/Ileostomy/Ileal Conduit: No       Date of Last BM: 03/02/21    Intake/Output Summary (Last 24 hours) at 2/25/2021 0925  Last data filed at 2/25/2021 0858  Gross per 24 hour   Intake 200 ml   Output 1200 ml   Net -1000 ml     I/O last 3 completed shifts:  In: -   Out: 900 [Urine:900]    Safety Concerns: At Risk for Falls    Impairments/Disabilities:      Language Barrier - [place]    Nutrition Therapy:  Current Nutrition Therapy:   - Oral Diet:  Low Sodium (2gm)    Routes of Feeding: Oral  Liquids: Thin Liquids  Daily Fluid Restriction: no  Last Modified Barium Swallow with Video (Video Swallowing Test): not done    Treatments at the Time of Hospital Discharge:   Respiratory Treatments: oxygen, inhalers  Oxygen Therapy:  is on oxygen at 3 L/min per nasal cannula.   Ventilator:    - No ventilator support    Rehab Therapies: Physical Therapy and Occupational Therapy  Weight Bearing Status/Restrictions: No weight bearing restirctions  Other Medical Equipment (for information only, NOT a DME order):  wheelchair and walker  Other Treatments: left pleurex catheter    Patient's personal belongings (please select all that are sent with patient):  Elida REAL SIGNATURE:  Electronically signed by Yady Arzate RN on 3/2/21 at 4:20 PM EST    CASE MANAGEMENT/SOCIAL WORK SECTION    Inpatient Status Date: Patient admitted to acute inpatient rehab on 2/19/21    Readmission Risk Assessment Score:  Readmission Risk              Risk of Unplanned Readmission:        17           Discharging to Facility/ Agency   · Name:   · Address:  · Phone:  · Fax:    Dialysis Facility (if applicable)

## 2021-02-25 NOTE — PLAN OF CARE
Problem: Falls - Risk of:  Goal: Will remain free from falls  Description: Will remain free from falls  Outcome: Ongoing  Goal: Absence of physical injury  Description: Absence of physical injury  Outcome: Ongoing     Problem: Mobility - Impaired:  Goal: Mobility will improve  Description: Mobility will improve  Outcome: Ongoing     Problem: Skin Integrity:  Goal: Will show no infection signs and symptoms  Description: Will show no infection signs and symptoms  Outcome: Ongoing  Goal: Absence of new skin breakdown  Description: Absence of new skin breakdown  Outcome: Ongoing     Problem: Pain:  Goal: Pain level will decrease  Description: Pain level will decrease  Outcome: Ongoing  Goal: Control of acute pain  Description: Control of acute pain  Outcome: Ongoing  Goal: Control of chronic pain  Description: Control of chronic pain  Outcome: Ongoing

## 2021-02-25 NOTE — PROGRESS NOTES
[facility] Occupational Therapy      Date: 2021  Patient Name: Arnel Nicole        MRN: [id number]  Account: [de-identified]   : 1932  (80 y.o.)  Room: [place] [address]    Answered patient's call light. Patient was standing at the sink in her bathroom with the walker in front of her and leaning against the sink on her elbow while she washed her hand. Supervised patient back to the bed. Noted pink fluid on the bed pad. Molina Holley RN notified and Oriana Everts the PCA was notified. Oxygen was replaced on patient and patient was left with Oriana Everts.      Electronically signed by SERAFIN Lisa on 2021 at 4:36 PM

## 2021-02-25 NOTE — ANESTHESIA PRE PROCEDURE
Department of Anesthesiology  Preprocedure Note       Name:  Ellen Zazueta   Age:  80 y.o.  :  1932                                          MRN:  [id number]         Date:  2021      Surgeon: Shy Gary):  Arnold Carmen MD    Procedure: Procedure(s):  RIGHT PLEURAL CATHETER INSERTION  MAC+ LOCAL ROOM 247  LATEX ALLERGY    Medications prior to admission:   Prior to Admission medications    Medication Sig Start Date End Date Taking? Authorizing Provider   Carboxymethylcellulose Sodium (EYE DROPS OP) Apply 1 drop to eye as needed (Dry eyes) Indications: Systane    Yes Historical Provider, MD   dilTIAZem (CARDIZEM CD) 120 MG extended release capsule Take 1 capsule by mouth 2 times daily 20  Yes Jorge Franco DO   Boswellia-Glucosamine-Vit D (OSTEO BI-FLEX ONE PER DAY) TABS Take by mouth daily   Yes Historical Provider, MD   Calcium Carbonate-Vitamin D (CALTRATE 600+D PO) Take by mouth daily   Yes Historical Provider, MD   Multiple Vitamins-Minerals (CENTRUM SILVER ULTRA WOMENS) TABS Take by mouth daily   Yes Historical Provider, MD   vitamin B-12 (CYANOCOBALAMIN) 1000 MCG tablet Take 1,000 mcg by mouth daily   Yes Historical Provider, MD   KLOR-CON M20 20 MEQ extended release tablet TAKE 1 TABLET DAILY WITH BREAKFAST (MUST CALL OFFICE FOR FOLLOW UP) 20  Yes Jorge Franco DO   aspirin 81 MG tablet Take 81 mg by mouth daily    Yes Historical Provider, MD   losartan (COZAAR) 50 MG tablet Take 50 mg by mouth 2 times daily  17  Yes Historical Provider, MD   citalopram (CELEXA) 20 MG tablet Take 20 mg by mouth daily  16  Yes Historical Provider, MD   SYNTHROID 88 MCG tablet Take 88 mcg by mouth daily  3/19/16  Yes Historical Provider, MD   hydrALAZINE (APRESOLINE) 10 MG tablet TAKE 1 TABLET IN THE EVENING.  repeat dose IN THE MORNING if systolic >962    Historical Provider, MD       Current medications:    Current Facility-Administered Medications Medication Dose Route Frequency Provider Last Rate Last Admin    tetrahydrozoline 0.05 % ophthalmic solution 1 drop  1 drop Both Eyes TID Deb Scullin, DO   1 drop at 02/24/21 2046    lubrifresh P.M. (artificial tears) ophthalmic ointment   Both Eyes PRN Deb Scullin, DO        [START ON 2/25/2021] polyethylene glycol (GLYCOLAX) packet 17 g  17 g Oral Daily Deb Scullin, DO        ferrous sulfate (IRON 325) tablet 325 mg  325 mg Oral BID  Deb Scullin, DO   325 mg at 02/24/21 1729    sulfamethoxazole-trimethoprim (BACTRIM DS;SEPTRA DS) 800-160 MG per tablet 1 tablet  1 tablet Oral BID  Deb Scullin, DO   1 tablet at 02/24/21 1729    lactobacillus acidophilus [facility]) 2 tablet  2 tablet Oral TID Angelia Bucco, DO   2 tablet at 02/24/21 2044    acetaminophen (TYLENOL) tablet 650 mg  650 mg Oral Q6H PRN John Zarate MD   650 mg at 02/24/21 0908    Or    acetaminophen (TYLENOL) suppository 650 mg  650 mg Rectal Q6H PRN John Zarate MD        promethazine (PHENERGAN) tablet 12.5 mg  12.5 mg Oral Q6H PRN John Zarate MD   12.5 mg at 02/24/21 1729    Or    ondansetron (ZOFRAN) injection 4 mg  4 mg Intravenous Q6H PRN John Zarate MD        sodium chloride flush 0.9 % injection 10 mL  10 mL Intravenous PRN John Zarate MD        aspirin chewable tablet 81 mg  81 mg Oral Daily John Zarate MD   81 mg at 02/24/21 0856    budesonide-formoterol (SYMBICORT) 160-4.5 MCG/ACT inhaler 2 puff  2 puff Inhalation BID John Zarate MD   2 puff at 02/24/21 1607    carvedilol (COREG) tablet 3.125 mg  3.125 mg Oral BID John Zarate MD   3.125 mg at 02/24/21 2044    citalopram (CELEXA) tablet 20 mg  20 mg Oral Daily John Zarate MD   20 mg at 02/24/21 0856    digoxin (LANOXIN) tablet 125 mcg  125 mcg Oral Daily John Zarate MD   125 mcg at 02/24/21 0856    furosemide (LASIX) tablet 20 mg  20 mg Oral Daily John Zarate MD   20 mg at 02/24/21 0912  levothyroxine (SYNTHROID) tablet 88 mcg  88 mcg Oral Daily Gerrit Kocher, MD   88 mcg at 02/24/21 0640    LORazepam (ATIVAN) tablet 0.5 mg  0.5 mg Oral Q6H PRN Gerrit Kocher, MD   0.5 mg at 02/19/21 2018    midodrine (PROAMATINE) tablet 5 mg  5 mg Oral TID WC Gerrit Kocher, MD   5 mg at 02/24/21 1729    nitroGLYCERIN (NITROSTAT) SL tablet 0.4 mg  0.4 mg Sublingual Q5 Min PRN Gerrit Kocher, MD        pantoprazole (PROTONIX) tablet 40 mg  40 mg Oral BID AC Gerrit Kocher, MD   40 mg at 02/24/21 1736    potassium chloride (KLOR-CON M) extended release tablet 20 mEq  20 mEq Oral Daily with breakfast Gerrit Kocher, MD   20 mEq at 02/24/21 0856    [Held by provider] rivaroxaban (XARELTO) tablet 15 mg  15 mg Oral Daily Gerrit Kocher, MD   15 mg at 02/22/21 1845    simethicone (MYLICON) chewable tablet 80 mg  80 mg Oral Q6H PRN Gerrit Kocher, MD        vitamin B-12 (CYANOCOBALAMIN) tablet 1,000 mcg  1,000 mcg Oral Daily Gerrit Kocher, MD   1,000 mcg at 02/24/21 1248       Allergies:     Allergies   Allergen Reactions    Latex     Tape Darrold Bloodgood Tape]        Problem List:    Patient Active Problem List   Diagnosis Code    Lumbar stenosis with neurogenic claudication M48.062    Acquired scoliosis M41.9    Osteoporosis M81.0    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    Excess weight E66.3    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    Osteoarthritis M19.90    Myelopathy ([address] G95.9    Impaired mobility and activities of daily living due to CHF nehemias [facility] re-admit 2/19/2021 Z74.09, Z78.9    Headache R51.9    Depression F32.9    Ascending aortic aneurysm (HCC) I71.2    Descending aortic aneurysm (HCC) I71.9    Dizziness R42    Shortness of breath R06.02    Essential hypertension I10    Acute on chronic combined systolic and diastolic CHF (congestive heart failure) (HCC) I50.43    Gait abnormality R26.9    Paroxysmal atrial fibrillation ([facility]) I48.0    Pleural effusion J90  Hypoxia R09.02    Acute pulmonary edema (HCC) J81.0    Acute on chronic combined systolic (congestive) and diastolic (congestive) heart failure (HCC) I50.43    Impaired mobility Z74.09    Acute cystitis with hematuria N30.01       Past Medical History:        Diagnosis Date    Ascending aortic aneurysm ([facility] 6/23/2017    Charcot Arleen Tooth muscular atrophy     Depression     Descending aortic aneurysm ([facility] 6/23/2017    Essential hypertension 2/16/2018    Headache     HTN (hypertension)     Impaired mobility and activities of daily living     Lumbar stenosis with neurogenic claudication     Myelopathy ([facility]     Osteoarthritis        Past Surgical History:        Procedure Laterality Date    JOINT REPLACEMENT      THORACENTESIS Left 01/29/2021    total of 755 cc removed per Dr Chin Cortez specimen sent to lab       Social History:    Social History     Tobacco Use    Smoking status: Never Smoker    Smokeless tobacco: Never Used   Substance Use Topics    Alcohol use: No     Alcohol/week: 0.0 standard drinks                                Counseling given: Not Answered      Vital Signs (Current):   Vitals:    02/24/21 1404 02/24/21 1627 02/24/21 1720 02/24/21 1833   BP:   120/82 126/78   Pulse:   71 65   Resp:    18   Temp:    98 °F (36.7 °C)   TempSrc:    Oral   SpO2: 97% 95%  94%   Weight:       Height:                                                  BP Readings from Last 3 Encounters:   02/24/21 126/78   02/19/21 126/81   02/09/21 121/86       NPO Status:                                                                                 BMI:   Wt Readings from Last 3 Encounters:   02/23/21 171 lb 12.8 oz (77.9 kg)   02/19/21 157 lb 4.8 oz (71.4 kg)   02/08/21 143 lb (64.9 kg)     Body mass index is 30.43 kg/m².     CBC:   Lab Results   Component Value Date    WBC 8.3 02/24/2021    RBC 3.55 02/24/2021    HGB 10.2 02/24/2021    HCT 30.9 02/24/2021    MCV 86.9 02/24/2021    RDW 14.2 02/24/2021  02/24/2021       CMP:   Lab Results   Component Value Date     02/24/2021    K 4.4 02/24/2021    K 4.1 02/13/2021     02/24/2021    CO2 27 02/24/2021    BUN 12 02/24/2021    CREATININE 0.77 02/24/2021    GFRAA >60.0 02/24/2021    LABGLOM >60.0 02/24/2021    GLUCOSE 98 02/24/2021    PROT 6.2 02/16/2021    CALCIUM 9.0 02/24/2021    BILITOT 0.3 02/16/2021    ALKPHOS 48 02/16/2021    AST 16 02/16/2021    ALT 14 02/16/2021       POC Tests: No results for input(s): POCGLU, POCNA, POCK, POCCL, POCBUN, POCHEMO, POCHCT in the last 72 hours. Coags:   Lab Results   Component Value Date    PROTIME 15.0 01/25/2021    INR 1.2 01/25/2021    APTT 30.3 01/25/2021       HCG (If Applicable): No results found for: PREGTESTUR, PREGSERUM, HCG, HCGQUANT     ABGs: No results found for: PHART, PO2ART, DTO0CBR, APZ1HVZ, BEART, M4MDWXHY     Type & Screen (If Applicable):  No results found for: LABABO, LABRH    Drug/Infectious Status (If Applicable):  No results found for: HIV, HEPCAB    COVID-19 Screening (If Applicable):   Lab Results   Component Value Date    COVID19 Not Detected 02/23/2021         Anesthesia Evaluation  Patient summary reviewed and Nursing notes reviewed no history of anesthetic complications:   Airway: Mallampati: II  TM distance: >3 FB   Neck ROM: full  Mouth opening: > = 3 FB Dental: normal exam         Pulmonary:Negative Pulmonary ROS and normal exam                               Cardiovascular:Negative CV ROS  Exercise tolerance: good (>4 METS),   (+) hypertension:, CHF:,       ECG reviewed      Echocardiogram reviewed    Cleared by cardiology     Beta Blocker:  Not on Beta Blocker         Neuro/Psych:   Negative Neuro/Psych ROS              GI/Hepatic/Renal: Neg GI/Hepatic/Renal ROS            Endo/Other: Negative Endo/Other ROS             Pt had PAT visit. Abdominal:           Vascular: negative vascular ROS.                                        Anesthesia Plan      MAC     ASA 3 Induction: intravenous. MIPS: Prophylactic antiemetics administered. Anesthetic plan and risks discussed with patient. Plan discussed with CRNA.     Attending anesthesiologist reviewed and agrees with Pre Eval content              Guerline Esteban MD   2/24/2021

## 2021-02-25 NOTE — PROGRESS NOTES
INDIVIDUALIZED OVERALL REHAB PLAN OF CARE  ADDENDUM TO REHAB PROGRESS NOTE-for audit purposes must also refer to this day's clinical note and combine the information      Date: 2021  Patient Name: Diane Hall   Room: Q895/R431-92    MRN: [id number]    : 1932  (80 y.o.)  Gender: female       Today 2021 during weekly team meeting, I reviewed the patient Diane Hall in detail with the therapists and nurses involved in patient's care gathering complex physiatric data regarding current medical issues, progress in therapies, factors limiting progress, social issues, psychological issues, ongoing therapeutic plans and discharge planning. Legend:  I= independent Im =Modified independent  S=Supervised SB=stand by MAGANA=set up CG=contact fran Min= minimal Mod=Moderate Max=maximal Max of 2 =maximal assist of 2 people      CURRENT FUNCTIONAL STATUS:    NURSING ISSUES:       OR in AM for left Pleurx catheter insertion with Dr. Lars Abrams will continue to focus on bowel and bladder continence transitioning toward independence by time of discharge. Monitoring post void residuals monitoring for severe constipation and bowel obstruction. Focus on achieving ADL goals with co-treating with OT when possible.     PHYSICAL THERAPY  Bed mobility:  Supine to Sit: Stand by assistance (21 140)  Sit to Supine: Stand by assistance (21 140)  Transfers:  Sit to Stand: Stand by assistance (21)  Bed to Chair: Stand by assistance (21 1434)  Gait:   Device: Rolling Walker (21 2246)  Other Apparatus: O2 (21)  Assistance: Contact guard assistance (21)  Distance: 40 feet x 2 with seated rest (21)  Quality of Gait: flexed trunk, increased steppage to clear floor safely (21)  Comments: 88% post gt (21)  Stairs:  # Steps : 4 (21 134)  Rails: Bilateral (21 134)  Assistance: Contact guard assistance;Stand by assistance (02/23/21 1345)  Comment: Non reciprocal pattern Increased SOB SpO2 96% post stair negotiation (02/23/21 1345)  W/C mobility:         OCCUPATIONAL THERAPY  Hand Dominance: Right  ADL  Feeding: Stand by assistance(self drinking using 2 handed technique to bring cup to mouth and use of straw to complete) (02/22/21 1613)  Grooming: Setup (02/22/21 1148)  UE Bathing: Setup;Supervision (02/22/21 1148)  LE Bathing: Moderate assistance (02/22/21 1148)  UE Dressing: Stand by assistance (02/22/21 1148)  LE Dressing: Maximum assistance (02/22/21 1148)  Toileting: Unable to assess(comment)(did not need to go) (02/22/21 1148)  Additional Comments: pt completed shower ADL at the above level. (02/22/21 1148)  Toilet Transfers  Toilet - Technique: Stand pivot (02/20/21 9922)  Equipment Used: Grab bars (02/20/21 0858)  Toilet Transfer: Unable to assess (02/22/21 1151)  Toilet Transfers Comments: did not need to go (02/22/21 1151)  Tub Transfers  Tub Transfers: Not tested (02/20/21 9110)  Shower Transfers  Shower - Transfer From: Wheelchair (02/22/21 1151)  Shower - Transfer Type: To and From (02/22/21 1151)  Shower - Transfer To: Shower seat with back (02/22/21 1151)  Shower - Technique: Stand pivot (02/22/21 1151)  Shower Transfers: Minimal assistance (02/22/21 1151)  Shower Transfers Comments: Pt completed sponge bath ADL (02/20/21 0858)      SPEECH THERAPY               Diet/Swallow:                           COGNITION  OT: Cognition Comment: comp: Sup express: TERENCE soc int: TERENCE prob solv: Lorri mem: Lorri  SP:            THERAPY, MEDICAL AND NURSING COORDINATION:    [x]  Pain medication before therapies     []  Check orthostatic BP      [x]  Ambulate to the bathroom in room    [x]  Add scheduled rest beaks     []  In room therapies      Discharge date set for:              3/2 /21      Home with:   alone  with help from   Son and dtr            And:      Home Health Care:     [x]  PT    [x]  OT    []  ST   [x]  Aide []  SW    [x]  RN                    Outpatient Therapy:  []  PT    []  OT    []  ST   []  Rehab Psych                 Equipment:  WW      At D/C their function is goaled at:   PT:Long term goal 1: Pt will perform bed mobility with indep  Long term goal 2: Pt will perform functional transfers with indep  Long term goal 3: Pt will ambulate >/=50ft with 2ww and mod I  Long term goal 4: Pt will negotiate 4 steps with handrail and SBA  Long term goal 5: Pt will perform >/= 23/56 for Bernard balance  OT:Eating  Assistance Needed: Setup or clean-up assistance  Comment: food cut, packages open  CARE Score: 5  Discharge Goal: Set-up or clean-up assistance, Oral Hygiene  Assistance Needed: Setup or clean-up assistance  CARE Score: 5  Discharge Goal: Set-up or clean-up assistance, Toileting Hygiene  Assistance Needed: Partial/moderate assistance  CARE Score: 3  Discharge Goal: Supervision or touching assistance, Shower/Bathe Self  Assistance Needed: Partial/moderate assistance  CARE Score: 3  Discharge Goal: Supervision or touching assistance  Upper Body Dressing  Assistance Needed: Supervision or touching assistance  CARE Score: 4  Discharge Goal: Set-up or clean-up assistance, Lower Body Dressing  Assistance Needed: Partial/moderate assistance  CARE Score: 3  Discharge Goal: Supervision or touching assistance, Putting On/Taking Off Footwear  Assistance Needed: Substantial/maximal assistance  CARE Score: 2  Discharge Goal: Partial/moderate assistance, Toilet Transfer  Assistance Needed: Partial/moderate assistance  CARE Score: 3  Discharge Goal: Supervision or touching assistance  SP:               From a cognitive standpoint they will need:        24 hr supervision  --progress to occasional           Significant problems/ barriers to functional progress include: Pt is at a high risk for functional loss,    [x]  Acute infection/UTI    []  Low BP's     []  COPD flare-up   []  Uncontrolled blood sugar     []  Progressive anemia [x]  Severe pain exacerbation     []  Impaired mental status    []  Urinary incontinence    []  Bowel incontinence           Plan to correct barriers to functional progress: Add scheduled rest breaks, control pain by using ice Lidoderm rest and massage as well as pain medications prior to therapy. Based on a comprehensive evaluation of the above, the individualized therapy and Discharge plan will be:    -Times stated are an average that will be varied based on the patient's daily need. PT  1 1/2  hrs/day 5-7 days per week           OT  1 1/2hrs per day 5-7 days per week         Estimated LOS 2 week(s)    - Overall functional prognosis:     [x]  Good    []  Fair    []  Poor -Medical Prognosis:   [x]  Good    []  Fair    []  Poor    This patient was made aware of the discussion of Plan of Care, their projected dicharge date and their projected function at discharge.        Mejia Cisse DO

## 2021-02-26 ENCOUNTER — APPOINTMENT (OUTPATIENT)
Dept: GENERAL RADIOLOGY | Age: 86
DRG: 091 | End: 2021-02-26
Attending: PHYSICAL MEDICINE & REHABILITATION
Payer: MEDICARE

## 2021-02-26 LAB
ANION GAP SERPL CALCULATED.3IONS-SCNC: 7 MEQ/L (ref 9–15)
BUN BLDV-MCNC: 13 MG/DL (ref 8–23)
CALCIUM SERPL-MCNC: 8.7 MG/DL (ref 8.5–9.9)
CHLORIDE BLD-SCNC: 102 MEQ/L (ref 95–107)
CO2: 27 MEQ/L (ref 20–31)
CREAT SERPL-MCNC: 0.71 MG/DL (ref 0.5–0.9)
GFR AFRICAN AMERICAN: >60
GFR NON-AFRICAN AMERICAN: >60
GLUCOSE BLD-MCNC: 114 MG/DL (ref 70–99)
HCT VFR BLD CALC: 29.6 % (ref 37–47)
HEMOGLOBIN: 9.8 G/DL (ref 12–16)
MCH RBC QN AUTO: 29.1 PG (ref 27–31.3)
MCHC RBC AUTO-ENTMCNC: 33.2 % (ref 33–37)
MCV RBC AUTO: 87.9 FL (ref 82–100)
PDW BLD-RTO: 14.6 % (ref 11.5–14.5)
PLATELET # BLD: 242 K/UL (ref 130–400)
POTASSIUM SERPL-SCNC: 4.5 MEQ/L (ref 3.4–4.9)
RBC # BLD: 3.37 M/UL (ref 4.2–5.4)
SODIUM BLD-SCNC: 136 MEQ/L (ref 135–144)
WBC # BLD: 9.4 K/UL (ref 4.8–10.8)

## 2021-02-26 PROCEDURE — 85027 COMPLETE CBC AUTOMATED: CPT

## 2021-02-26 PROCEDURE — 97535 SELF CARE MNGMENT TRAINING: CPT

## 2021-02-26 PROCEDURE — 99232 SBSQ HOSP IP/OBS MODERATE 35: CPT | Performed by: PHYSICAL MEDICINE & REHABILITATION

## 2021-02-26 PROCEDURE — 94640 AIRWAY INHALATION TREATMENT: CPT

## 2021-02-26 PROCEDURE — 1180000000 HC REHAB R&B

## 2021-02-26 PROCEDURE — 74018 RADEX ABDOMEN 1 VIEW: CPT

## 2021-02-26 PROCEDURE — 6370000000 HC RX 637 (ALT 250 FOR IP): Performed by: PHYSICAL MEDICINE & REHABILITATION

## 2021-02-26 PROCEDURE — 6370000000 HC RX 637 (ALT 250 FOR IP): Performed by: INTERNAL MEDICINE

## 2021-02-26 PROCEDURE — 97116 GAIT TRAINING THERAPY: CPT

## 2021-02-26 PROCEDURE — 94760 N-INVAS EAR/PLS OXIMETRY 1: CPT

## 2021-02-26 PROCEDURE — 36415 COLL VENOUS BLD VENIPUNCTURE: CPT

## 2021-02-26 PROCEDURE — 80048 BASIC METABOLIC PNL TOTAL CA: CPT

## 2021-02-26 PROCEDURE — 97110 THERAPEUTIC EXERCISES: CPT

## 2021-02-26 PROCEDURE — 97530 THERAPEUTIC ACTIVITIES: CPT

## 2021-02-26 PROCEDURE — 6370000000 HC RX 637 (ALT 250 FOR IP): Performed by: THORACIC SURGERY (CARDIOTHORACIC VASCULAR SURGERY)

## 2021-02-26 PROCEDURE — 2700000000 HC OXYGEN THERAPY PER DAY

## 2021-02-26 RX ADMIN — PANTOPRAZOLE SODIUM 40 MG: 40 TABLET, DELAYED RELEASE ORAL at 16:50

## 2021-02-26 RX ADMIN — CARVEDILOL 3.12 MG: 3.12 TABLET, FILM COATED ORAL at 08:12

## 2021-02-26 RX ADMIN — SIMETHICONE 80 MG: 80 TABLET, CHEWABLE ORAL at 13:48

## 2021-02-26 RX ADMIN — LACTOBACILLUS TAB 2 TABLET: TAB at 13:48

## 2021-02-26 RX ADMIN — LEVOTHYROXINE SODIUM 88 MCG: 88 TABLET ORAL at 06:33

## 2021-02-26 RX ADMIN — ASPIRIN 81 MG: 81 TABLET, CHEWABLE ORAL at 08:12

## 2021-02-26 RX ADMIN — DIGOXIN 125 MCG: 125 TABLET ORAL at 08:12

## 2021-02-26 RX ADMIN — ACETAMINOPHEN 650 MG: 325 TABLET ORAL at 01:26

## 2021-02-26 RX ADMIN — FERROUS SULFATE TAB 325 MG (65 MG ELEMENTAL FE) 325 MG: 325 (65 FE) TAB at 08:12

## 2021-02-26 RX ADMIN — TETRAHYDROZOLINE HCL 1 DROP: 0.05 SOLUTION/ DROPS OPHTHALMIC at 23:58

## 2021-02-26 RX ADMIN — CYANOCOBALAMIN TAB 500 MCG 1000 MCG: 500 TAB at 08:12

## 2021-02-26 RX ADMIN — POLYETHYLENE GLYCOL 3350 17 G: 17 POWDER, FOR SOLUTION ORAL at 08:13

## 2021-02-26 RX ADMIN — BUDESONIDE AND FORMOTEROL FUMARATE DIHYDRATE 2 PUFF: 160; 4.5 AEROSOL RESPIRATORY (INHALATION) at 16:06

## 2021-02-26 RX ADMIN — MIDODRINE HYDROCHLORIDE 5 MG: 5 TABLET ORAL at 08:12

## 2021-02-26 RX ADMIN — RIVAROXABAN 15 MG: 15 TABLET, FILM COATED ORAL at 18:31

## 2021-02-26 RX ADMIN — MIDODRINE HYDROCHLORIDE 5 MG: 5 TABLET ORAL at 11:29

## 2021-02-26 RX ADMIN — POTASSIUM CHLORIDE 20 MEQ: 20 TABLET, EXTENDED RELEASE ORAL at 08:12

## 2021-02-26 RX ADMIN — LACTOBACILLUS TAB 2 TABLET: TAB at 08:12

## 2021-02-26 RX ADMIN — CARVEDILOL 3.12 MG: 3.12 TABLET, FILM COATED ORAL at 23:57

## 2021-02-26 RX ADMIN — TETRAHYDROZOLINE HCL 1 DROP: 0.05 SOLUTION/ DROPS OPHTHALMIC at 08:15

## 2021-02-26 RX ADMIN — SULFAMETHOXAZOLE AND TRIMETHOPRIM 1 TABLET: 800; 160 TABLET ORAL at 18:31

## 2021-02-26 RX ADMIN — SULFAMETHOXAZOLE AND TRIMETHOPRIM 1 TABLET: 800; 160 TABLET ORAL at 08:12

## 2021-02-26 RX ADMIN — CITALOPRAM HYDROBROMIDE 20 MG: 20 TABLET ORAL at 08:12

## 2021-02-26 RX ADMIN — FUROSEMIDE 20 MG: 20 TABLET ORAL at 08:12

## 2021-02-26 RX ADMIN — PANTOPRAZOLE SODIUM 40 MG: 40 TABLET, DELAYED RELEASE ORAL at 06:33

## 2021-02-26 RX ADMIN — LACTOBACILLUS TAB 2 TABLET: TAB at 23:57

## 2021-02-26 ASSESSMENT — ENCOUNTER SYMPTOMS
ABDOMINAL DISTENTION: 1
NAUSEA: 1
ABDOMINAL PAIN: 1
CONSTIPATION: 1

## 2021-02-26 ASSESSMENT — PAIN SCALES - GENERAL
PAINLEVEL_OUTOF10: 5
PAINLEVEL_OUTOF10: 3
PAINLEVEL_OUTOF10: 0

## 2021-02-26 ASSESSMENT — PAIN DESCRIPTION - LOCATION
LOCATION: ABDOMEN
LOCATION: ABDOMEN

## 2021-02-26 ASSESSMENT — PAIN DESCRIPTION - FREQUENCY: FREQUENCY: INTERMITTENT

## 2021-02-26 NOTE — PROGRESS NOTES
Physical Therapy Rehab Treatment Note  Facility/Department: [facility]  Room: K070/O812-88       NAME: Macey Smith  : 1932 (80 y.o.)  MRN: [id number]  CODE STATUS: Full Code    Date of Service: 2021  Chart Reviewed: Yes  Family / Caregiver Present: No    Restrictions:          SUBJECTIVE: Subjective: Patient continues to c/o increased fatigue, SOB and nausea. Response To Previous Treatment: Patient with no complaints from previous session. Pain Screening  Patient Currently in Pain: Denies  Post Treatment Pain Screening:denies        OBJECTIVE:     Transfers  Sit to Stand: Contact guard assistance  Stand to sit: Contact guard assistance  Comment: patient requires cue for hand placement to improve safety- poor carryover. Ambulation  Ambulation?: Yes  Ambulation 1  Surface: carpet  Device: Rolling Walker  Other Apparatus: O2  Assistance: Contact guard assistance  Quality of Gait: flexed forward posture, WBOS, low steppage gait due to right drop foot, dyspnea on exertion, decreased safety with approach to chair  Gait Deviations: Slow Lea; Increased JAMES  Distance: 35 feet x2  Comments: sats 95% at rest. after seated rest a few minutes. Exercises  Hip Flexion: x 20 RTB  Hip Abduction: RTB /Ball x 20  Knee Long Arc Quad: x 20   Sit to stand x5   Single steps forward at ww- focus on weight shifting       ASSESSMENT/COMMENTS: patient is mildly impulsive with transfers and gait. Demonstrates decreased when approaching chair to sit. Tolerance to standing activity is limited by nausea/ fatigue.         PLAN OF CARE/Safety: all safety precautions in place          Therapy Time:   Individual   Time In 30   Time Out 1000   Minutes 30     Minutes:30      Transfer/Bed mobility trainin      Gait training:15     Therapeutic ex:10      Laurita Han PTA, 21 at 12:37 PM

## 2021-02-26 NOTE — PROGRESS NOTES
Occupational Therapy  Facility/Department: [facility]  Daily Treatment Note  NAME: Sara Lai  : 1932  MRN: [id number]    Date of Service: 2021    Discharge Recommendations:  Continue to assess pending progress       Assessment   Assessment: Pt. continues to demonstrate decreased  and decreased UE endurance. Pt. requires increased time and demonstrates continued need for rest breaks for B hands. Pt. continues to benefit from OT to maximize independence with ADL tasks. Activity Tolerance  Activity Tolerance: Patient Tolerated treatment well  Activity Tolerance: 3L O2  Safety Devices  Safety Devices in place: Yes  Type of devices: All fall risk precautions in place         Patient Diagnosis(es): There were no encounter diagnoses. has a past medical history of Ascending aortic aneurysm ([address] Charcot Arleen Tooth muscular atrophy, Depression, Descending aortic aneurysm ([address] Essential hypertension, Headache, HTN (hypertension), Impaired mobility and activities of daily living, Lumbar stenosis with neurogenic claudication, Myelopathy ([address] and Osteoarthritis. has a past surgical history that includes joint replacement (Bilateral); thoracentesis (Left, 2021); and Pleural Cath Insertion (Left, 2021). Restrictions  Restrictions/Precautions  Restrictions/Precautions: Fall Risk     Subjective   General  Chart Reviewed: Yes  Patient assessed for rehabilitation services?: Yes  Family / Caregiver Present: No  Diagnosis: Impaired mobility and ADLs d/t CHF exacerbation    Subjective  Subjective: \"No good. Surgery. \"    Pain Assessment  Pain Level: 3  Pain Type: Acute pain  Pain Location: Abdomen  Pain Descriptors: Aching  Pre Treatment Pain Screening  Pain at present: 3  Intervention List: Patient able to continue with treatment     Orientation  Orientation  Overall Orientation Status: Within Normal Limits     Objective      Patient engaged in B UE ROM/strengthening and B FM on 3-2-21         Goals  Patient Goals   Patient goals :  To go home       Therapy Time   Individual Concurrent Group Co-treatment   Time In 1500         Time Out 1600         Minutes 60             Therapeutic activities: 60 minutes     Electronically signed by PRUDENCE Torres on 2/26/21 at 4:35 PM PRUDENCE Chirinos

## 2021-02-26 NOTE — PROGRESS NOTES
Physical Therapy Rehab Treatment Note  Facility/Department: [facility]  Room: A493/N592-51       NAME: Sara Lai  : 1932 (80 y.o.)  MRN: [id number]  CODE STATUS: Full Code    Date of Service: 2021  Chart Reviewed: Yes  Family / Caregiver Present: Yes(Son in room. Reports pt threw up yesterday and this morning and hasn't been feeling well.)    Restrictions:  Restrictions/Precautions: Fall Risk       SUBJECTIVE: Subjective: Pt reports nausea and fatigue. Response To Previous Treatment: Patient with no complaints from previous session. Pain Screening  Patient Currently in Pain: No  Pre Treatment Pain Screening  Pain at present: 0  Scale Used: Numeric Score  Intervention List: Patient able to continue with treatment    Post Treatment Pain Screening:  Pain Assessment  Pain Level: 0    OBJECTIVE:                    Bed mobility  Supine to Sit: Modified independent  Scooting: Supervision    Transfers  Sit to Stand: Stand by assistance  Stand to sit: Stand by assistance  Stand Pivot Transfers: Stand by assistance    Ambulation  Ambulation?: Yes  Ambulation 1  Surface: carpet  Device: Rolling Walker  Other Apparatus: O2  Assistance: Stand by assistance  Quality of Gait: Bilateral foot drop with RLE more pronounced than left with high steppage gait on right. Fair posture but slightly flexed forward. Pt with dyspnea with gait and needed to stop once to stand and rest.  Distance: 120' with standing rest break after about 60'. Exercises  Hip Flexion: x 20  Hip Abduction: Side kicks x 20  Knee Long Arc Quad: x 20     ASSESSMENT/COMMENTS:  Assessment: Pt fatigued and needed several rest breaks, but did demonstrate improved gait and transfer abilities. PLAN OF CARE/Safety:   Safety Devices  Type of devices:  All fall risk precautions in place      Therapy Time:   Individual   Time In 1300   Time Out 1330   Minutes 30     Minutes:30      Transfer/Bed mobility trainin      Gait training: 10      Neuro re education: 0     Therapeutic ex: Nanci 7, PTA, 02/26/21 at 4:03 PM

## 2021-02-26 NOTE — PROGRESS NOTES
Comprehensive Nutrition Assessment    Type and Reason for Visit:  Initial, RD Nutrition Re-Screen/LOS    Nutrition Recommendations/Plan:   Continue with 2gNa diet as tolerated. Add Clear oral supplement B & D, Frozen oral supplement @ L. Continue oral nutrition supplements until po > 75% meals   Consider GI consult of nausea/abdominal fullness continues    Nutrition Assessment:  Pt at risk for malnutrition related to frequent hospitalizations, extended LOS with transfer to rehab. Pt speaks primarily [place]    Malnutrition Assessment:  Malnutrition Status: At risk for malnutrition (Comment)    Context:  Chronic Illness     Findings of the 6 clinical characteristics of malnutrition:  Energy Intake:  Mild decrease in energy intake (Comment)  Weight Loss:  No significant weight loss     Body Fat Loss:  No significant body fat loss     Muscle Mass Loss:  Unable to assess    Fluid Accumulation:  No significant fluid accumulation     Strength:  Not Performed    Estimated Daily Nutrient Needs:  Energy (kcal):  5229-4010 kcals @ 20-22 kcal/kg; Weight Used for Energy Requirements:  Current(72 kg)     Protein (g):  ~68 g protien @ 1.3 g/kg IBW; Weight Used for Protein Requirements:  Ideal(52 kg)        Fluid (ml/day):  ~9133-6016 ml; Method Used for Fluid Requirements:  1 ml/kcal      Nutrition Related Findings:  Unable to examine, interview pt, not in room at time of visit, spoke with son who reports pt has had vague c/o abdominal fullness, early satiety for a few weeks. Did have N/V after breakfast this morning.  Last BM was today,Limited intake records to assess, staff report generally < 50%, had a pleurex catheter placed 2/25,, trace generalized edema per nursing, which may mask weight loss, is able to ambulate with assit, feed self. labs noted, meds reviewed, Hx of CHF ,' Charcot Arleen tooth muscle atrophy', HTN,      Wounds:  None       Current Nutrition Therapies:    DIET GENERAL; Low Sodium (2 GM)    Anthropometric Measures:  · Height: 5' 3\" (160 cm)  · Current Body Weight: 159 lb (72.1 kg)(2/23)   · Admission Body Weight: 163 lb (73.9 kg)(2/16)    · Usual Body Weight: 171 lb (77.6 kg)((10/20), 157# ( 1/21))     · Ideal Body Weight: 115 lbs;   · BMI: 28.2  · BMI Categories: Overweight (BMI 25.0-29. 9)       Nutrition Diagnosis:   · Inadequate oral intake related to altered GI function as evidenced by intake 26-50%, nausea      Nutrition Interventions:   Food and/or Nutrient Delivery:  Continue Current Diet, Start Oral Nutrition Supplement(Continue with 2gNa diet as tolerated. Add Clear oral supplement B & D, Frozen oral supplement @ L.  Continue oral nutrition supplements until po > 75% meals, Consider GI consult of nausea/abdominal fullness continues)  Nutrition Education/Counseling:  No recommendation at this time   Coordination of Nutrition Care:  Continue to monitor while inpatient    Goals:  po > 50% meals and supplements, maintain wt 155-160#, improvementin GI symptoms       Nutrition Monitoring and Evaluation:        Food/Nutrient Intake Outcomes:  Diet Advancement/Tolerance, Food and Nutrient Intake, Supplement Intake  Physical Signs/Symptoms Outcomes:  GI Status, Weight, Meal Time Behavior     Discharge Planning:    No discharge needs at this time     Electronically signed by Jeramie Akbar RD, LD on 2/26/21 at 1:59 PM EST

## 2021-02-26 NOTE — PROGRESS NOTES
Progress Note  Date:2021       Room:R2/R247-01  Patient Amira Charles     YOB: 1932     Age:88 y.o. Subjective    Subjective:  Symptoms:  Improved. (Abdominal pain and bloating). Diet:  Poor intake. She reports  nausea. Activity level: Impaired due to weakness. Pain:  She complains of pain that is moderate. She reports pain is improving. Pain is partially controlled. Review of Systems   Gastrointestinal: Positive for abdominal distention, abdominal pain, constipation and nausea. All other systems reviewed and are negative. Objective         Vitals Last 24 Hours:  TEMPERATURE:  Temp  Av °F (36.7 °C)  Min: 98 °F (36.7 °C)  Max: 98 °F (36.7 °C)  RESPIRATIONS RANGE: Resp  Av  Min: 17  Max: 19  PULSE OXIMETRY RANGE: SpO2  Av %  Min: 93 %  Max: 96 %  PULSE RANGE: Pulse  Av  Min: 65  Max: 79  BLOOD PRESSURE RANGE: Systolic (73AOR), EOB:372 , Min:125 , XXP:478   ; Diastolic (34EXB), SBJ:72, Min:72, Max:90    I/O (24Hr): Intake/Output Summary (Last 24 hours) at 2021 1534  Last data filed at 2021 0955  Gross per 24 hour   Intake --   Output 900 ml   Net -900 ml     Objective:  General Appearance:  Well-appearing. Vital signs: (most recent): Blood pressure (!) 145/90, pulse 79, temperature 98 °F (36.7 °C), temperature source Oral, resp. rate 19, height 5' 3\" (1.6 m), weight 159 lb 8 oz (72.3 kg), SpO2 96 %. Vital signs are normal.    Output: Producing urine and producing stool. HEENT: Normal HEENT exam.    Lungs:  Normal effort and normal respiratory rate. Breath sounds clear to auscultation. Heart: Normal rate. Regular rhythm. S1 normal and S2 normal.    Abdomen: Abdomen is soft. Bowel sounds are normal.   There is generalized tenderness. Extremities: Normal range of motion. Pulses: Distal pulses are intact. Neurological: Patient is alert. Pupils:  Pupils are equal, round, and reactive to light.     Skin: Warm and dry. Labs/Imaging/Diagnostics    Labs:  CBC:  Recent Labs     02/24/21  0556 02/25/21  0530 02/26/21  0509   WBC 8.3 7.8 9.4   RBC 3.55* 3.33* 3.37*   HGB 10.2* 9.8* 9.8*   HCT 30.9* 29.0* 29.6*   MCV 86.9 87.1 87.9   RDW 14.2 14.7* 14.6*    263 242     CHEMISTRIES:  Recent Labs     02/24/21  0556 02/25/21  0530 02/26/21  0509    138 136   K 4.4 4.4 4.5    103 102   CO2 27 28 27   BUN 12 13 13   CREATININE 0.77 0.78 0.71   GLUCOSE 98 94 114*     PT/INR:No results for input(s): PROTIME, INR in the last 72 hours. APTT:No results for input(s): APTT in the last 72 hours. LIVER PROFILE:No results for input(s): AST, ALT, BILIDIR, BILITOT, ALKPHOS in the last 72 hours. Imaging Last 24 Hours:  Xr Chest (single View Frontal)    Result Date: 2/25/2021  Exam: XR CHEST (SINGLE VIEW FRONTAL) History:  Chest tube placement Technique: AP portable view of the chest obtained. Comparison: Chest CT from February 17, 2021 and chest x-ray from February 16, 2021 Chest x-ray portable Findings: The cardiac silhouette is enlarged. There is aneurysmal dilatation of the thoracic aorta that are seen on the recent CT scan. There are mild increased markings throughout both lungs. There is a left chest tube in the lower left hemithorax. There is a small left apical pneumothorax and there is a persistent small left pleural effusion and likely underlying atelectasis or consolidation. . There is scoliosis of thoracolumbar spine. There is a left chest tube in place and a small left apical pneumothorax. There are small left pleural effusion. Xr Abdomen (kub) (single Ap View)    Result Date: 2/26/2021  EXAMINATION: KUB HISTORY: Abdominal pain TECHNIQUE: Frontal view of the abdomen and pelvis obtained COMPARISON: CT abdomen pelvis from February 8, 2020 FINDINGS: No calcifications identified over the bilateral renal shadows or expected course of the ureters. Nonobstructive bowel gas pattern.  No evidence of free air. S shaped scoliotic curvature of the thoracolumbar spine. Degenerative changes of the spine. Left-sided chest tube projects over the left lung base. Nonobstructive bowel gas pattern. Assessment//Plan           Hospital Problems           Last Modified POA    * (Principal) Impaired mobility and activities of daily living due to CHF exkortney, [facility] re-admit 2/19/2021 2/21/2021 Yes    Paroxysmal atrial fibrillation ([address] 2/20/2021 Yes    Lumbar stenosis with neurogenic claudication 2/20/2021 Yes    Osteoporosis 2/20/2021 Yes    Charcot Arleen Tooth muscular atrophy 2/21/2021 Yes    Myelopathy ([address] 2/20/2021 Yes    Dizziness 2/21/2021 Yes    Essential hypertension 2/20/2021 Yes    Pleural effusion 2/20/2021 Yes    Overview Signed 2/8/2021  3:12 PM by Angelia Garcia, DO     left pleural effusion. Thoracentesis done 1/29/2021. 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 Impaired mobility 2/19/2021 Yes    Acute cystitis with hematuria 2/21/2021 Yes        Assessment:    Condition: In stable condition. Improving. (Patient complaining of abdominal distention and nausea. Had one-time episode of emesis this morning. She complains of some cramping and discomfort and poor appetite because of a \"full\" feeling. ). Plan:   Per physical therapy. Advance diet as tolerated. X-rays as ordered. Administer medications as ordered. (Abdominal discomfort and nausea: KUB obtained. Nonobstructive bowel gas pattern. Will treat with simethicone. Zofran as needed for nausea. ).        Electronically signed by ADRIENNE Corral CNP on 2/26/21 at 3:34 PM EST

## 2021-02-26 NOTE — PLAN OF CARE
Nutrition Problem #1: Inadequate oral intake  Intervention: Food and/or Nutrient Delivery: Continue Current Diet, Start Oral Nutrition Supplement(Continue with 2gNa diet as tolerated. Add Clear oral supplement B & D, Frozen oral supplement @ L.  Continue oral nutrition supplements until po > 75% meals, Consider GI consult of nausea/abdominal fullness continues)  Nutritional Goals: po > 50% meals and supplements, maintain wt 155-160#, improvementin GI symptoms

## 2021-02-26 NOTE — PROGRESS NOTES
Subjective: The patient complains of  moderate to severe acute    CHF partially relieved by rest, PT, OT,   and exacerbated by recent illness and exertion. I am concerned about patients  frailty and bleeding risk on Xarelto. She is status post left Pleurx catheter placement doing well postoperatively. She will need to restart her Xarelto. According to recent nursing note, \"  Pt sitting at bed side dangling, inspected pt bedding for bleeding as was reported by therapy. No active bleeding or S/S of distress. Pt skin is pink from procedure prep. Dressing is clean dry and intact w/no drainage at this time. Call light is within reach at this time. \"    ROS x10: The patient also complains of severely impaired mobility and activities of daily living. Otherwise no new problems with vision, hearing, nose, mouth, throat, dermal, cardiovascular, GI, , pulmonary, musculoskeletal, psychiatric or neurological. See Rehab H&P on Rehab chart dated . Vital signs:  /72   Pulse 65   Temp 98 °F (36.7 °C) (Oral)   Resp 17   Ht 5' 3\" (1.6 m)   Wt 159 lb 2 oz (72.2 kg)   SpO2 93%   BMI 28.19 kg/m²   I/O:   PO/Intake:  fair PO intake, no problems observed or reported. Bowel/Bladder:   UTI Bactrim, constipation  ,taylor for retention  General:  Patient is well developed, adequately nourished, non-obese and     well kempt. HEENT:    PERRLA, hearing intact to loud voice, external inspection of ear     and nose benign. Inspection of lips, tongue and gums benign  Musculoskeletal: No significant change in strength or tone. All joints stable. Inspection and palpation of digits and nails show no clubbing,       cyanosis or inflammatory conditions. Neuro/Psychiatric: Affect: flat. Alert and oriented to person, place and     situation. No significant change in deep tendon reflexes or     sensation  Lungs:  Diminished, CTA-B. Respiration effort is normal at rest.     Heart:   S1 = S2, RRR.   No loud murmurs. Abdomen:  Soft, non-tender, no enlargement of liver or spleen. Extremities:  No significant lower extremity edema or tenderness. Skin:   Intact to general survey, healing left Pleurx catheter    Rehabilitation:  Physical therapy: FIMS:  Bed Mobility: Scooting: Minimal assistance    Transfers: Sit to Stand: Stand by assistance  Stand to sit: Stand by assistance, Contact guard assistance  Bed to Chair: Stand by assistance, Ambulation 1  Surface: carpet  Device: Rolling Walker  Other Apparatus: O2  Assistance: Contact guard assistance  Quality of Gait: flexed trunk, increased steppage to clear floor safely  Gait Deviations: Slow Lea, Increased JAMES  Distance: 40 feet x 2 with seated rest  Comments: 88% post gt, Stairs  # Steps : 4  Stairs Height: 6\"  Rails: Bilateral  Device: No Device  Assistance: Contact guard assistance, Stand by assistance  Comment: Non reciprocal pattern Increased SOB SpO2 96% post stair negotiation    FIMS:  ,  , Assessment: Patient SOB with gait and stairs and requires extended seated rest break to continue with therapy session. Patient maintains 96% SpO2 throughout therapy session    Occupational therapy: FIMS:   ,  , Assessment: Pt is 81 y/o female presenting with the above deficits, resulting in increased dependence for safe competion of self care ADLs. Pt limited by weakness/decreased endurance and poor  strength for fine motor areas of care. Pt would benefit from OT to improve on condition to maximize safety and independence with functional transfers, mobility an self care.     Speech therapy: FIMS:        Lab/X-ray studies reviewed, analyzed and discussed with patient and staff:   Recent Results (from the past 24 hour(s))   Basic Metabolic Panel    Collection Time: 02/26/21  5:09 AM   Result Value Ref Range    Sodium 136 135 - 144 mEq/L    Potassium 4.5 3.4 - 4.9 mEq/L    Chloride 102 95 - 107 mEq/L    CO2 27 20 - 31 mEq/L    Anion Gap 7 (L) 9 - 15 mEq/L    Glucose 114 (H) 70 - 99 mg/dL    BUN 13 8 - 23 mg/dL    CREATININE 0.71 0.50 - 0.90 mg/dL    GFR Non-African American >60.0 >60    GFR  >60.0 >60    Calcium 8.7 8.5 - 9.9 mg/dL   CBC    Collection Time: 02/26/21  5:09 AM   Result Value Ref Range    WBC 9.4 4.8 - 10.8 K/uL    RBC 3.37 (L) 4.20 - 5.40 M/uL    Hemoglobin 9.8 (L) 12.0 - 16.0 g/dL    Hematocrit 29.6 (L) 37.0 - 47.0 %    MCV 87.9 82.0 - 100.0 fL    MCH 29.1 27.0 - 31.3 pg    MCHC 33.2 33.0 - 37.0 %    RDW 14.6 (H) 11.5 - 14.5 %    Platelets 932 916 - 026 K/uL       Previous extensive, complex labs, notes and diagnostics reviewed and analyzed. ALLERGIES:    Allergies as of 02/19/2021 - Review Complete 02/19/2021   Allergen Reaction Noted    Latex  07/19/2017    Tape [adhesive tape]  06/28/2016      (please also verify by checking MAR)      Yesterday I evaluated this patient for periodic reassessment of medical and functional status. The patient was discussed in detail at the treatment team meeting focusing on current medical issues, progress in therapies, social issues, psychological issues, barriers to progress and strategies to address these barriers, and discharge planning. See the hand written addendum to rehab progress note. The patient continues to be high risk for future disability and their medical and rehabilitation prognosis continue to be good and therefore, we will continue the patient's rehabilitation course as planned. The patient's tentative discharge date was set. Patient and family education was discussed. The patient was made aware of the team discussion regarding their progress. Discharge plans were discussed along with barriers to progress and strategies to address these barriers, patient encouraged to continue to discuss discharge plans with . Complex Physical Medicine & Rehab Issues Assess & Plan:   1.  Severe abnormality of gait and mobility and impaired self-care and ADL's secondary to progressive weakness dt   CHF . Functional and medical status reassessed regarding patients ability to participate in therapies and patient found to be able to participate in acute intensive comprehensive inpatient rehabilitation program including PT/OT to improve balance, ambulation, ADLs, and to improve the P/AROM. Therapeutic modifications regarding activities in therapies, place, amount of time per day and intensity of therapy made daily. In bed therapies or bedside therapies prn.   2. Bowel progressive constipation, and Bladder dysfunction monitoring neurogenic bladder:  frequent toileting, ambulate to bathroom with assistance, check post void residuals. Check for C.difficile x1 if >2 loose stools in 24 hours, continue bowel & bladder program.  Monitor bowel and bladder function. Lactinex 2 PO every AC. MOM prn, Brown Bomb prn, Glycerin suppository prn, enema prn.  3. Severe lbp pain as well as generalized OA pain: reassess pain every shift and prior to and after each therapy session, give prn medications, modalities prn in therapy, Lidoderm, K-pad prn.   4. Skin healing left Pleurx catheter incision and breakdown risk:  continue pressure relief program.  Daily skin exams and reports from nursing. 5. Severe fatigue due to nutritional and hydration deficiency: Add vitamin B12 vitamin D and CoQ10 continue to monitor I&Os, calorie counts prn, dietary consult prn. Add healthy HS snack. 6. Acute episodic insomnia with situational adjustment disorder:  prn Ambien, monitor for day time sedation. Add HS \"Tuck In\"  7. Falls risk elevated:  patient to use call light to get nursing assistance to get up, bed and chair alarm. 8. Elevated DVT risk: progressive activities in PT, continue prophylaxis PORTILOL hose, elevation and Xarelto will be on hold pending thoracic surgery. 9. Complex discharge planning: Discharge home alone 3/2/21 with help from her son and daughter as well as home health care.    Progress toward her final weekly team meeting  Monday to assess progress towards goals, discuss and address social, psychological and medical comorbidities and to address difficulties they may be having progressing in therapy. Patient and family education is in progress. The patient is to follow-up with their family physician after discharge. Complex Active General Medical Issues that complicate care Assess & Plan:     1. Principal Problem:    Impaired mobility and activities of daily living due to CHF [facility] admit 2021  Active Problems:  1. Paroxysmal atrial fibrillation,  Essential hypertension with low blood pressures of late, recurrent pleural effusions acute on chronic combined systolic and diastolic CHF (congestive heart failure)-continue blood signs every shift focusing on heart rate and blood pressure checks, consult hospitalist for backup medical and adjust/add medications (aspirin, Xarelto, Coreg, Cardizem, Lasix, Nitrostat) plan as above consult cardiology titrate IV Lasix recheck chest x-ray wean high flow oxygen patient  requires a VATS procedure-scheduled for 2/25/2021. .  Midodrine titration add abdominal binders and teds  2. Acute severe UTI-Bactrim and monitor for residual urine in the bladder dose Bactrim with food add IM Rocephin check urine culture check postvoid residuals  3. Lumbar stenosis with neurogenic claudication  4. Charcot Arleen Tooth muscular atrophy, Osteoarthritis of lumbar spine with myelopathy,  Osteoarthritis  5. Severe depression and anxiety -emotional support provided daily, vitamin B12, encourage participation in rehabilitation support group and recreational therapy, adjust/add medications (Celexa Ativan)  6. Hypothyroidism-titrate Synthroid  7. Recurrent pleural effusion patient is going for- left Pleurx catheter insertion PA of 2/24/2021 with Dr. Anayeli Landaverde. Family and patient will need instruction on how to use it as an outpatient.                 Gisela Garzon D.O., PM&R     Attending    [address]

## 2021-02-26 NOTE — PROGRESS NOTES
Occupational Therapy  Facility/Department: [facility]  Daily Treatment Note  NAME: Arnel Nicole  : 1932  MRN: [id number]    Date of Service: 2021    Discharge Recommendations:  Continue to assess pending progress       Assessment   Assessment: Pt. continues to demonstrate decreased  and decreased UE endurance. Pt. requires increased time and demonstrates continued need for rest breaks for B hands. Pt. continues to benefit from OT to maximize independence with ADL tasks. Activity Tolerance  Activity Tolerance: Patient Tolerated treatment well  Activity Tolerance: 3L O2  Safety Devices  Safety Devices in place: Yes  Type of devices: All fall risk precautions in place         Patient Diagnosis(es): There were no encounter diagnoses. has a past medical history of Ascending aortic aneurysm ([address] Charcot Arleen Tooth muscular atrophy, Depression, Descending aortic aneurysm ([address] Essential hypertension, Headache, HTN (hypertension), Impaired mobility and activities of daily living, Lumbar stenosis with neurogenic claudication, Myelopathy ([address] and Osteoarthritis. has a past surgical history that includes joint replacement (Bilateral); thoracentesis (Left, 2021); and Pleural Cath Insertion (Left, 2021). Restrictions  Restrictions/Precautions  Restrictions/Precautions: Fall Risk  Subjective   General  Chart Reviewed: Yes  Patient assessed for rehabilitation services?: Yes  Family / Caregiver Present: No  Diagnosis: Impaired mobility and ADLs d/t CHF exacerbation  Subjective  Subjective: \"Okay. \"  Pain Assessment  Pain Assessment: 0-10  Pain Level: 5  Pain Type: Acute pain  Pain Location: Abdomen  Pain Orientation: Lower  Pain Descriptors: Aching  Pre Treatment Pain Screening  Pain at present: 5  Scale Used: Numeric Score  Intervention List: Patient able to continue with treatment;Patient declined any intervention  Vital Signs  Patient Currently in Pain: Yes   Orientation Objective       Instrumental ADL's  Instrumental ADLs: Yes  Light Housekeeping  Light Housekeeping Level: Wheelchair  Light Housekeeping: Pt completed IADL activity of folding socks to improve BUE reaching and hand strength. Pt. was able to fold 10 pairs of socks with G coordination and UE endurance. Pt. with min difficulty manipulating socks. Coordination  Fine Motor: Pt engaged in fine motor coordination and hand strengthening tasks to improve grasp for IADL containers. Pt. was able to place/remove 4/5 grades of graded clothespins on dowels with min difficulty, largely using gross grasp. Pt. able to reach three levels of dowel board to place. Pt. was also able to utiliize B hands to locate 15 beads from green theraputty. Pt. required increased time to manipulate putty utilizing B hands. Plan   Plan  Times per week: 5-7x week  Plan weeks: 1-2  Current Treatment Recommendations: Strengthening, Safety Education & Training, Balance Training, Patient/Caregiver Education & Training, Self-Care / ADL, Functional Mobility Training, Neuromuscular Re-education, Endurance Training  Plan Comment: Continue per OT POC for planned d/c on 3-2-21  G-Code     OutComes Score  AM-PAC Score  Goals  Patient Goals   Patient goals :  To go home       Therapy Time   Individual Concurrent Group Co-treatment   Time In 1000         Time Out 1030         Minutes 30           ADL training: 10 minutes  Therapeutic activities: 20 minutes      Electronically signed by OSMANI Gomes/L on 2/26/2021 at 10:58 AM    OSMANI Gomes/RACHEL

## 2021-02-27 LAB
ANION GAP SERPL CALCULATED.3IONS-SCNC: 12 MEQ/L (ref 9–15)
BUN BLDV-MCNC: 11 MG/DL (ref 8–23)
CALCIUM SERPL-MCNC: 8.7 MG/DL (ref 8.5–9.9)
CHLORIDE BLD-SCNC: 99 MEQ/L (ref 95–107)
CO2: 22 MEQ/L (ref 20–31)
CREAT SERPL-MCNC: 0.66 MG/DL (ref 0.5–0.9)
GFR AFRICAN AMERICAN: >60
GFR NON-AFRICAN AMERICAN: >60
GLUCOSE BLD-MCNC: 106 MG/DL (ref 70–99)
HCT VFR BLD CALC: 29.4 % (ref 37–47)
HEMOGLOBIN: 9.7 G/DL (ref 12–16)
MCH RBC QN AUTO: 28.6 PG (ref 27–31.3)
MCHC RBC AUTO-ENTMCNC: 32.8 % (ref 33–37)
MCV RBC AUTO: 87 FL (ref 82–100)
PDW BLD-RTO: 14.6 % (ref 11.5–14.5)
PLATELET # BLD: 234 K/UL (ref 130–400)
POTASSIUM SERPL-SCNC: 4.1 MEQ/L (ref 3.4–4.9)
RBC # BLD: 3.38 M/UL (ref 4.2–5.4)
SODIUM BLD-SCNC: 133 MEQ/L (ref 135–144)
WBC # BLD: 8.7 K/UL (ref 4.8–10.8)

## 2021-02-27 PROCEDURE — 6370000000 HC RX 637 (ALT 250 FOR IP): Performed by: INTERNAL MEDICINE

## 2021-02-27 PROCEDURE — 94640 AIRWAY INHALATION TREATMENT: CPT

## 2021-02-27 PROCEDURE — 6370000000 HC RX 637 (ALT 250 FOR IP): Performed by: PHYSICAL MEDICINE & REHABILITATION

## 2021-02-27 PROCEDURE — 80048 BASIC METABOLIC PNL TOTAL CA: CPT

## 2021-02-27 PROCEDURE — 85027 COMPLETE CBC AUTOMATED: CPT

## 2021-02-27 PROCEDURE — 36415 COLL VENOUS BLD VENIPUNCTURE: CPT

## 2021-02-27 PROCEDURE — 1180000000 HC REHAB R&B

## 2021-02-27 PROCEDURE — 94761 N-INVAS EAR/PLS OXIMETRY MLT: CPT

## 2021-02-27 PROCEDURE — 6370000000 HC RX 637 (ALT 250 FOR IP): Performed by: THORACIC SURGERY (CARDIOTHORACIC VASCULAR SURGERY)

## 2021-02-27 PROCEDURE — 6370000000 HC RX 637 (ALT 250 FOR IP): Performed by: NURSE PRACTITIONER

## 2021-02-27 PROCEDURE — 2700000000 HC OXYGEN THERAPY PER DAY

## 2021-02-27 RX ORDER — DIAPER,BRIEF,INFANT-TODD,DISP
EACH MISCELLANEOUS 2 TIMES DAILY
Status: DISCONTINUED | OUTPATIENT
Start: 2021-02-27 | End: 2021-02-27 | Stop reason: RX

## 2021-02-27 RX ORDER — BENZOCAINE/MENTHOL 6 MG-10 MG
LOZENGE MUCOUS MEMBRANE 2 TIMES DAILY
Status: DISCONTINUED | OUTPATIENT
Start: 2021-02-27 | End: 2021-03-02 | Stop reason: HOSPADM

## 2021-02-27 RX ADMIN — LACTOBACILLUS TAB 2 TABLET: TAB at 09:03

## 2021-02-27 RX ADMIN — FERROUS SULFATE TAB 325 MG (65 MG ELEMENTAL FE) 325 MG: 325 (65 FE) TAB at 17:12

## 2021-02-27 RX ADMIN — SULFAMETHOXAZOLE AND TRIMETHOPRIM 1 TABLET: 800; 160 TABLET ORAL at 09:03

## 2021-02-27 RX ADMIN — FERROUS SULFATE TAB 325 MG (65 MG ELEMENTAL FE) 325 MG: 325 (65 FE) TAB at 09:04

## 2021-02-27 RX ADMIN — MIDODRINE HYDROCHLORIDE 5 MG: 5 TABLET ORAL at 09:03

## 2021-02-27 RX ADMIN — PANTOPRAZOLE SODIUM 40 MG: 40 TABLET, DELAYED RELEASE ORAL at 06:40

## 2021-02-27 RX ADMIN — CARVEDILOL 3.12 MG: 3.12 TABLET, FILM COATED ORAL at 09:04

## 2021-02-27 RX ADMIN — LACTOBACILLUS TAB 2 TABLET: TAB at 19:51

## 2021-02-27 RX ADMIN — PANTOPRAZOLE SODIUM 40 MG: 40 TABLET, DELAYED RELEASE ORAL at 17:12

## 2021-02-27 RX ADMIN — LEVOTHYROXINE SODIUM 88 MCG: 88 TABLET ORAL at 06:40

## 2021-02-27 RX ADMIN — LACTOBACILLUS TAB 2 TABLET: TAB at 14:08

## 2021-02-27 RX ADMIN — TETRAHYDROZOLINE HCL 1 DROP: 0.05 SOLUTION/ DROPS OPHTHALMIC at 08:50

## 2021-02-27 RX ADMIN — CARVEDILOL 3.12 MG: 3.12 TABLET, FILM COATED ORAL at 19:50

## 2021-02-27 RX ADMIN — POLYETHYLENE GLYCOL 3350 17 G: 17 POWDER, FOR SOLUTION ORAL at 09:04

## 2021-02-27 RX ADMIN — SULFAMETHOXAZOLE AND TRIMETHOPRIM 1 TABLET: 800; 160 TABLET ORAL at 17:12

## 2021-02-27 RX ADMIN — CITALOPRAM HYDROBROMIDE 20 MG: 20 TABLET ORAL at 09:04

## 2021-02-27 RX ADMIN — BUDESONIDE AND FORMOTEROL FUMARATE DIHYDRATE 2 PUFF: 160; 4.5 AEROSOL RESPIRATORY (INHALATION) at 15:02

## 2021-02-27 RX ADMIN — DIGOXIN 125 MCG: 125 TABLET ORAL at 09:04

## 2021-02-27 RX ADMIN — FUROSEMIDE 20 MG: 20 TABLET ORAL at 09:04

## 2021-02-27 RX ADMIN — ASPIRIN 81 MG: 81 TABLET, CHEWABLE ORAL at 09:04

## 2021-02-27 RX ADMIN — CYANOCOBALAMIN TAB 500 MCG 1000 MCG: 500 TAB at 09:04

## 2021-02-27 RX ADMIN — POTASSIUM CHLORIDE 20 MEQ: 20 TABLET, EXTENDED RELEASE ORAL at 09:04

## 2021-02-27 RX ADMIN — HYDROCORTISONE: 0.01 CREAM TOPICAL at 19:51

## 2021-02-27 RX ADMIN — TETRAHYDROZOLINE HCL 1 DROP: 0.05 SOLUTION/ DROPS OPHTHALMIC at 14:07

## 2021-02-27 RX ADMIN — RIVAROXABAN 15 MG: 15 TABLET, FILM COATED ORAL at 17:12

## 2021-02-27 ASSESSMENT — PAIN SCALES - GENERAL
PAINLEVEL_OUTOF10: 0
PAINLEVEL_OUTOF10: 0

## 2021-02-27 NOTE — PROGRESS NOTES
Subjective: The patient complains of  moderate to severe acute    CHF partially relieved by rest, PT, OT,   and exacerbated by recent illness and exertion. ROS x10: The patient also complains of severely impaired mobility and activities of daily living. Otherwise no new problems with vision, hearing, nose, mouth, throat, dermal, cardiovascular, GI, , pulmonary, musculoskeletal, psychiatric or neurological. See Rehab H&P on Rehab chart dated . Vital signs:  BP (!) 133/92   Pulse 98   Temp 98 °F (36.7 °C) (Oral)   Resp 16   Ht 5' 3\" (1.6 m)   Wt 160 lb 4.8 oz (72.7 kg)   SpO2 93%   BMI 28.40 kg/m²   I/O:   PO/Intake:  fair PO intake, no problems observed or reported. Bowel/Bladder:   UTI Bactrim, constipation  ,taylor for retention  General:  Patient is well developed, adequately nourished, non-obese and     well kempt. HEENT:    PERRLA, hearing intact to loud voice, external inspection of ear     and nose benign. Inspection of lips, tongue and gums benign  Musculoskeletal: No significant change in strength or tone. All joints stable. Inspection and palpation of digits and nails show no clubbing,       cyanosis or inflammatory conditions. Neuro/Psychiatric: Affect: flat. Alert and oriented to person, place and     situation. No significant change in deep tendon reflexes or     sensation  Lungs:  Diminished, CTA-B. Respiration effort is normal at rest.     Heart:   S1 = S2, RRR. No loud murmurs. Abdomen:  Soft, non-tender, no enlargement of liver or spleen. Extremities:  No significant lower extremity edema or tenderness.   Skin:   Intact to general survey, healing left Pleurx catheter    Rehabilitation:  Physical therapy: FIMS:  Bed Mobility: Scooting: Supervision    Transfers: Sit to Stand: Stand by assistance  Stand to sit: Stand by assistance  Bed to Chair: Stand by assistance, Ambulation 1  Surface: carpet  Device: Rolling Walker  Other Apparatus: O2  Assistance: Stand by assistance  Quality of Gait: Bilateral foot drop with RLE more pronounced than left with high steppage gait on right. Fair posture but slightly flexed forward. Pt with dyspnea with gait and needed to stop once to stand and rest.  Gait Deviations: Slow Lea, Increased JAMES  Distance: 120' with standing rest break after about 60'. Comments: sats 95% at rest. after seated rest a few minutes. , Stairs  # Steps : 4  Stairs Height: 6\"  Rails: Bilateral  Device: No Device  Assistance: Contact guard assistance, Stand by assistance  Comment: Non reciprocal pattern Increased SOB SpO2 96% post stair negotiation    FIMS:  ,  , Assessment: Pt fatigued and needed several rest breaks, but did demonstrate improved gait and transfer abilities. Occupational therapy: FIMS:   ,  , Assessment: Pt. continues to demonstrate decreased  and decreased UE endurance. Pt. requires increased time and demonstrates continued need for rest breaks for B hands. Pt. continues to benefit from OT to maximize independence with ADL tasks.     Speech therapy: FIMS:        Lab/X-ray studies reviewed, analyzed and discussed with patient and staff:   Recent Results (from the past 24 hour(s))   Basic Metabolic Panel    Collection Time: 02/27/21  7:01 AM   Result Value Ref Range    Sodium 133 (L) 135 - 144 mEq/L    Potassium 4.1 3.4 - 4.9 mEq/L    Chloride 99 95 - 107 mEq/L    CO2 22 20 - 31 mEq/L    Anion Gap 12 9 - 15 mEq/L    Glucose 106 (H) 70 - 99 mg/dL    BUN 11 8 - 23 mg/dL    CREATININE 0.66 0.50 - 0.90 mg/dL    GFR Non-African American >60.0 >60    GFR  >60.0 >60    Calcium 8.7 8.5 - 9.9 mg/dL   CBC    Collection Time: 02/27/21  7:01 AM   Result Value Ref Range    WBC 8.7 4.8 - 10.8 K/uL    RBC 3.38 (L) 4.20 - 5.40 M/uL    Hemoglobin 9.7 (L) 12.0 - 16.0 g/dL    Hematocrit 29.4 (L) 37.0 - 47.0 %    MCV 87.0 82.0 - 100.0 fL    MCH 28.6 27.0 - 31.3 pg    MCHC 32.8 (L) 33.0 - 37.0 %    RDW 14.6 (H) 11.5 - 14.5 %    Platelets admit 2021  Active Problems:  1. Paroxysmal atrial fibrillation,  Essential hypertension with low blood pressures of late, recurrent pleural effusions acute on chronic combined systolic and diastolic CHF (congestive heart failure)-continue blood signs every shift focusing on heart rate and blood pressure checks, consult hospitalist for backup medical and adjust/add medications (aspirin, Xarelto, Coreg, Cardizem, Lasix, Nitrostat) plan as above consult cardiology titrate IV Lasix recheck chest x-ray wean high flow oxygen patient  requires a VATS procedure-scheduled for 2/25/2021. .  Midodrine titration add abdominal binders and teds  2. Acute severe UTI-Bactrim and monitor for residual urine in the bladder dose Bactrim with food add IM Rocephin check urine culture check postvoid residuals  3. Lumbar stenosis with neurogenic claudication  4. Charcot Arleen Tooth muscular atrophy, Osteoarthritis of lumbar spine with myelopathy,  Osteoarthritis  5. Severe depression and anxiety -emotional support provided daily, vitamin B12, encourage participation in rehabilitation support group and recreational therapy, adjust/add medications (Celexa Ativan)  6. Hypothyroidism-titrate Synthroid  7. Recurrent pleural effusion patient is going for- left Pleurx catheter insertion PA of 2/24/2021 with Dr. Maryanne Samuel. Family and patient will need instruction on how to use it as an outpatient.             Danniel Records MD Corinne Bors, D.O., PM&R     Attending    [facility] Cabrera

## 2021-02-28 LAB
ANION GAP SERPL CALCULATED.3IONS-SCNC: 9 MEQ/L (ref 9–15)
BUN BLDV-MCNC: 9 MG/DL (ref 8–23)
CALCIUM SERPL-MCNC: 8.7 MG/DL (ref 8.5–9.9)
CHLORIDE BLD-SCNC: 103 MEQ/L (ref 95–107)
CO2: 24 MEQ/L (ref 20–31)
CREAT SERPL-MCNC: 0.63 MG/DL (ref 0.5–0.9)
GFR AFRICAN AMERICAN: >60
GFR NON-AFRICAN AMERICAN: >60
GLUCOSE BLD-MCNC: 95 MG/DL (ref 70–99)
HCT VFR BLD CALC: 30.1 % (ref 37–47)
HEMOGLOBIN: 9.9 G/DL (ref 12–16)
MCH RBC QN AUTO: 28.7 PG (ref 27–31.3)
MCHC RBC AUTO-ENTMCNC: 32.8 % (ref 33–37)
MCV RBC AUTO: 87.3 FL (ref 82–100)
PDW BLD-RTO: 14.8 % (ref 11.5–14.5)
PLATELET # BLD: 237 K/UL (ref 130–400)
POTASSIUM SERPL-SCNC: 4.2 MEQ/L (ref 3.4–4.9)
RBC # BLD: 3.45 M/UL (ref 4.2–5.4)
SODIUM BLD-SCNC: 136 MEQ/L (ref 135–144)
WBC # BLD: 7.3 K/UL (ref 4.8–10.8)

## 2021-02-28 PROCEDURE — 97116 GAIT TRAINING THERAPY: CPT

## 2021-02-28 PROCEDURE — 6370000000 HC RX 637 (ALT 250 FOR IP): Performed by: THORACIC SURGERY (CARDIOTHORACIC VASCULAR SURGERY)

## 2021-02-28 PROCEDURE — 85027 COMPLETE CBC AUTOMATED: CPT

## 2021-02-28 PROCEDURE — 36415 COLL VENOUS BLD VENIPUNCTURE: CPT

## 2021-02-28 PROCEDURE — 80048 BASIC METABOLIC PNL TOTAL CA: CPT

## 2021-02-28 PROCEDURE — 6370000000 HC RX 637 (ALT 250 FOR IP): Performed by: INTERNAL MEDICINE

## 2021-02-28 PROCEDURE — 2700000000 HC OXYGEN THERAPY PER DAY

## 2021-02-28 PROCEDURE — 1180000000 HC REHAB R&B

## 2021-02-28 PROCEDURE — 97112 NEUROMUSCULAR REEDUCATION: CPT

## 2021-02-28 PROCEDURE — 94761 N-INVAS EAR/PLS OXIMETRY MLT: CPT

## 2021-02-28 PROCEDURE — 94640 AIRWAY INHALATION TREATMENT: CPT

## 2021-02-28 PROCEDURE — 2580000003 HC RX 258: Performed by: INTERNAL MEDICINE

## 2021-02-28 PROCEDURE — 99232 SBSQ HOSP IP/OBS MODERATE 35: CPT | Performed by: PHYSICAL MEDICINE & REHABILITATION

## 2021-02-28 PROCEDURE — 6370000000 HC RX 637 (ALT 250 FOR IP): Performed by: PHYSICAL MEDICINE & REHABILITATION

## 2021-02-28 RX ADMIN — LACTOBACILLUS TAB 2 TABLET: TAB at 08:34

## 2021-02-28 RX ADMIN — CYANOCOBALAMIN TAB 500 MCG 1000 MCG: 500 TAB at 08:33

## 2021-02-28 RX ADMIN — LACTOBACILLUS TAB 2 TABLET: TAB at 20:00

## 2021-02-28 RX ADMIN — LACTOBACILLUS TAB 2 TABLET: TAB at 15:46

## 2021-02-28 RX ADMIN — SODIUM CHLORIDE, PRESERVATIVE FREE 10 ML: 5 INJECTION INTRAVENOUS at 08:34

## 2021-02-28 RX ADMIN — FERROUS SULFATE TAB 325 MG (65 MG ELEMENTAL FE) 325 MG: 325 (65 FE) TAB at 08:34

## 2021-02-28 RX ADMIN — CARVEDILOL 3.12 MG: 3.12 TABLET, FILM COATED ORAL at 08:34

## 2021-02-28 RX ADMIN — RIVAROXABAN 15 MG: 15 TABLET, FILM COATED ORAL at 15:47

## 2021-02-28 RX ADMIN — CITALOPRAM HYDROBROMIDE 20 MG: 20 TABLET ORAL at 08:34

## 2021-02-28 RX ADMIN — SULFAMETHOXAZOLE AND TRIMETHOPRIM 1 TABLET: 800; 160 TABLET ORAL at 08:33

## 2021-02-28 RX ADMIN — PANTOPRAZOLE SODIUM 40 MG: 40 TABLET, DELAYED RELEASE ORAL at 15:47

## 2021-02-28 RX ADMIN — HYDROCORTISONE: 0.01 CREAM TOPICAL at 08:34

## 2021-02-28 RX ADMIN — TETRAHYDROZOLINE HCL 1 DROP: 0.05 SOLUTION/ DROPS OPHTHALMIC at 15:48

## 2021-02-28 RX ADMIN — ASPIRIN 81 MG: 81 TABLET, CHEWABLE ORAL at 08:34

## 2021-02-28 RX ADMIN — TETRAHYDROZOLINE HCL 1 DROP: 0.05 SOLUTION/ DROPS OPHTHALMIC at 08:35

## 2021-02-28 RX ADMIN — TETRAHYDROZOLINE HCL 1 DROP: 0.05 SOLUTION/ DROPS OPHTHALMIC at 20:03

## 2021-02-28 RX ADMIN — BUDESONIDE AND FORMOTEROL FUMARATE DIHYDRATE 2 PUFF: 160; 4.5 AEROSOL RESPIRATORY (INHALATION) at 16:21

## 2021-02-28 RX ADMIN — POTASSIUM CHLORIDE 20 MEQ: 20 TABLET, EXTENDED RELEASE ORAL at 08:34

## 2021-02-28 RX ADMIN — DIGOXIN 125 MCG: 125 TABLET ORAL at 08:34

## 2021-02-28 RX ADMIN — CARVEDILOL 3.12 MG: 3.12 TABLET, FILM COATED ORAL at 20:01

## 2021-02-28 RX ADMIN — SULFAMETHOXAZOLE AND TRIMETHOPRIM 1 TABLET: 800; 160 TABLET ORAL at 15:47

## 2021-02-28 RX ADMIN — FERROUS SULFATE TAB 325 MG (65 MG ELEMENTAL FE) 325 MG: 325 (65 FE) TAB at 15:46

## 2021-02-28 RX ADMIN — LEVOTHYROXINE SODIUM 88 MCG: 88 TABLET ORAL at 06:15

## 2021-02-28 RX ADMIN — FUROSEMIDE 20 MG: 20 TABLET ORAL at 08:34

## 2021-02-28 RX ADMIN — SIMETHICONE 80 MG: 80 TABLET, CHEWABLE ORAL at 20:00

## 2021-02-28 RX ADMIN — POLYETHYLENE GLYCOL 3350 17 G: 17 POWDER, FOR SOLUTION ORAL at 08:34

## 2021-02-28 RX ADMIN — PANTOPRAZOLE SODIUM 40 MG: 40 TABLET, DELAYED RELEASE ORAL at 06:16

## 2021-02-28 RX ADMIN — HYDROCORTISONE: 0.01 CREAM TOPICAL at 20:03

## 2021-02-28 ASSESSMENT — PAIN DESCRIPTION - DESCRIPTORS: DESCRIPTORS: ACHING

## 2021-02-28 ASSESSMENT — PAIN SCALES - GENERAL: PAINLEVEL_OUTOF10: 0

## 2021-02-28 ASSESSMENT — PAIN DESCRIPTION - ORIENTATION: ORIENTATION: LOWER

## 2021-02-28 NOTE — PROGRESS NOTES
Physical Therapy Rehab Treatment Note  Facility/Department: [facility]  Room: L437/Q191-65       NAME: Hank Phan  : 1932 (80 y.o.)  MRN: [id number]  CODE STATUS: Full Code    Date of Service: 2021  Chart Reviewed: Yes    Restrictions:  Restrictions/Precautions: Fall Risk       SUBJECTIVE: Subjective: Pt reports mild LBP. Daughter in law present for part of session. Pain Screening  Patient Currently in Pain: Yes       Pre/Post Treatment Pain Screening:  Pain Assessment  Patient's Stated Pain Goal: 5  Pain Location: Back  Pain Orientation: Lower  Pain Descriptors: Aching    OBJECTIVE:               Neuromuscular Education  Neuromuscular Comments: Static stands, alt toe taps 0-2 UE support         Transfers  Sit to Stand: Contact guard assistance;Stand by assistance  Stand to sit: Contact guard assistance;Stand by assistance    Ambulation  Ambulation?: Yes  Ambulation 1  Surface: carpet  Device: Rolling Walker  Other Apparatus: O2  Assistance: Stand by assistance  Quality of Gait: Steppage gait, fatigued at 100 ft  Gait Deviations: Slow Lea; Increased JAMES  Distance: 115ft  Comments: sats 95% after ambulation, pt visibly SOB    Stairs/Curb  Stairs?: Yes  Stairs  # Steps : 4  Stairs Height: 6\"  Rails: Bilateral  Device: No Device  Assistance: Contact guard assistance;Stand by assistance                    ASSESSMENT/COMMENTS:  Body structures, Functions, Activity limitations: Decreased functional mobility ; Decreased safe awareness;Decreased balance;Decreased ADL status; Decreased endurance;Decreased strength;Decreased posture  Assessment: Pt able to maintain O2 sats WNLs however continues to display SOB. Mild impulsive throughout session, decreased O2 line management and awareness observed.  Significantly challenged with NBOS activities requiring UE assist.  Prognosis: Good  REQUIRES PT FOLLOW UP: Yes    PLAN OF CARE/Safety:          Therapy Time:   Individual   Time In 5572   Time Out 8926 Minutes 30     Minutes:30      Transfer/Bed mobility trainin      Gait training:15      Neuro re education:10     Therapeutic ex:      Pily Fischer PTA, 21 at 1:29 PM    Electronically signed by Pily Fischer PTA on 2021 at 1:29 PM

## 2021-02-28 NOTE — PROGRESS NOTES
Subjective: The patient complains of  moderate to severe acute    CHF partially relieved by rest, PT, OT,   and exacerbated by recent illness and exertion. I am concerned about patients  frailty and bleeding risk on Xarelto. She is status post left Pleurx catheter placement doing well postoperatively. She will need to restart her Xarelto. According to recent nursing note, \" Pt c/o abdominal bloating and fullness. KUB showed gas. Prn Simethicone given. Dressing intact to left pleurx. LBM 2/26/21. Taylor catheter in place. Walks with a walker. \"    ROS x10: The patient also complains of severely impaired mobility and activities of daily living. Otherwise no new problems with vision, hearing, nose, mouth, throat, dermal, cardiovascular, GI, , pulmonary, musculoskeletal, psychiatric or neurological. See Rehab H&P on Rehab chart dated . Vital signs:  /71   Pulse 72   Temp 98 °F (36.7 °C)   Resp 15   Ht 5' 3\" (1.6 m)   Wt 165 lb 1.6 oz (74.9 kg)   SpO2 93%   BMI 29.25 kg/m²   I/O:   PO/Intake:  fair PO intake, no problems observed or reported. Bowel/Bladder:   UTI Bactrim, constipation  ,taylor for retention, constipation  General:  Patient is well developed, adequately nourished, non-obese and     well kempt. HEENT:    PERRLA, hearing intact to loud voice, external inspection of ear     and nose benign. Inspection of lips, tongue and gums benign  Musculoskeletal: No significant change in strength or tone. All joints stable. Inspection and palpation of digits and nails show no clubbing,       cyanosis or inflammatory conditions. Neuro/Psychiatric: Affect: flat. Alert and oriented to person, place and     situation. No significant change in deep tendon reflexes or     sensation  Lungs:  Diminished, CTA-B. Respiration effort is normal at rest.     Heart:   S1 = S2, RRR. No loud murmurs.     Abdomen:  Mildly distended with gas, soft, non-tender, no enlargement of liver or spleen. Extremities:  No significant lower extremity edema or tenderness. Skin:   Intact to general survey, healing left Pleurx catheter    Rehabilitation:  Physical therapy: FIMS:  Bed Mobility: Scooting: Supervision    Transfers: Sit to Stand: Stand by assistance  Stand to sit: Stand by assistance  Bed to Chair: Stand by assistance, Ambulation 1  Surface: carpet  Device: Rolling Walker  Other Apparatus: O2  Assistance: Stand by assistance  Quality of Gait: Bilateral foot drop with RLE more pronounced than left with high steppage gait on right. Fair posture but slightly flexed forward. Pt with dyspnea with gait and needed to stop once to stand and rest.  Gait Deviations: Slow Lea, Increased JAMES  Distance: 120' with standing rest break after about 60'. Comments: sats 95% at rest. after seated rest a few minutes. , Stairs  # Steps : 4  Stairs Height: 6\"  Rails: Bilateral  Device: No Device  Assistance: Contact guard assistance, Stand by assistance  Comment: Non reciprocal pattern Increased SOB SpO2 96% post stair negotiation    FIMS:  ,  , Assessment: Pt fatigued and needed several rest breaks, but did demonstrate improved gait and transfer abilities. Occupational therapy: FIMS:   ,  , Assessment: Pt. continues to demonstrate decreased  and decreased UE endurance. Pt. requires increased time and demonstrates continued need for rest breaks for B hands. Pt. continues to benefit from OT to maximize independence with ADL tasks.     Speech therapy: FIMS:        Lab/X-ray studies reviewed, analyzed and discussed with patient and staff:   Recent Results (from the past 24 hour(s))   Basic Metabolic Panel    Collection Time: 02/28/21  5:15 AM   Result Value Ref Range    Sodium 136 135 - 144 mEq/L    Potassium 4.2 3.4 - 4.9 mEq/L    Chloride 103 95 - 107 mEq/L    CO2 24 20 - 31 mEq/L    Anion Gap 9 9 - 15 mEq/L    Glucose 95 70 - 99 mg/dL    BUN 9 8 - 23 mg/dL    CREATININE 0.63 0.50 - 0.90 mg/dL    GFR Non- >60.0 >60    GFR  >60.0 >60    Calcium 8.7 8.5 - 9.9 mg/dL   CBC    Collection Time: 02/28/21  5:15 AM   Result Value Ref Range    WBC 7.3 4.8 - 10.8 K/uL    RBC 3.45 (L) 4.20 - 5.40 M/uL    Hemoglobin 9.9 (L) 12.0 - 16.0 g/dL    Hematocrit 30.1 (L) 37.0 - 47.0 %    MCV 87.3 82.0 - 100.0 fL    MCH 28.7 27.0 - 31.3 pg    MCHC 32.8 (L) 33.0 - 37.0 %    RDW 14.8 (H) 11.5 - 14.5 %    Platelets 331 754 - 892 K/uL       Previous extensive, complex labs, notes and diagnostics reviewed and analyzed. ALLERGIES:    Allergies as of 02/19/2021 - Review Complete 02/19/2021   Allergen Reaction Noted    Latex  07/19/2017    Tape [adhesive tape]  06/28/2016      (please also verify by checking MAR)      I have encouraged patient to attend their adjustment to rehabilitation support group with rec therapy and rehabilitation psychology in order to improve their adjustments, well-being, and help their spiritual and cognitive recovery. Complex Physical Medicine & Rehab Issues Assess & Plan:   1. Severe abnormality of gait and mobility and impaired self-care and ADL's secondary to progressive weakness dt   CHF . Functional and medical status reassessed regarding patients ability to participate in therapies and patient found to be able to participate in acute intensive comprehensive inpatient rehabilitation program including PT/OT to improve balance, ambulation, ADLs, and to improve the P/AROM. Therapeutic modifications regarding activities in therapies, place, amount of time per day and intensity of therapy made daily. In bed therapies or bedside therapies prn.   2. Bowel progressive constipation, and Bladder dysfunction monitoring neurogenic bladder:  frequent toileting, ambulate to bathroom with assistance, check post void residuals. Check for C.difficile x1 if >2 loose stools in 24 hours, continue bowel & bladder program.  Monitor bowel and bladder function. Lactinex 2 PO every AC. MOM prn, Brown Bomb prn, Glycerin suppository prn, enema prn. Add lactobacillus and lactulose. 3. Severe lbp pain as well as generalized OA pain: reassess pain every shift and prior to and after each therapy session, give prn medications, modalities prn in therapy, Lidoderm, K-pad prn.   4. Skin healing left Pleurx catheter incision and breakdown risk:  continue pressure relief program.  Daily skin exams and reports from nursing. 5. Severe fatigue due to nutritional and hydration deficiency: Add vitamin B12 vitamin D and CoQ10 continue to monitor I&Os, calorie counts prn, dietary consult prn. Add healthy HS snack. 6. Acute episodic insomnia with situational adjustment disorder:  prn Ambien, monitor for day time sedation. Add HS \"Tuck In\"  7. Falls risk elevated:  patient to use call light to get nursing assistance to get up, bed and chair alarm. 8. Elevated DVT risk: progressive activities in PT, continue prophylaxis PORTILLO hose, elevation and Xarelto will be on hold pending thoracic surgery. 9. Complex discharge planning: Discharge home alone 3/2/21 with help from her son and daughter as well as home health care. Progress toward her final weekly team meeting  Monday to assess progress towards goals, discuss and address social, psychological and medical comorbidities and to address difficulties they may be having progressing in therapy. Patient and family education is in progress. The patient is to follow-up with their family physician after discharge. Complex Active General Medical Issues that complicate care Assess & Plan:     1. Principal Problem:    Impaired mobility and activities of daily living due to CHF [facility] admit 2021  Active Problems:  1.    Paroxysmal atrial fibrillation,  Essential hypertension with low blood pressures of late, recurrent pleural effusions acute on chronic combined systolic and diastolic CHF (congestive heart failure)-continue blood signs every shift focusing on

## 2021-03-01 LAB
ANION GAP SERPL CALCULATED.3IONS-SCNC: 11 MEQ/L (ref 9–15)
BACTERIA: NEGATIVE /HPF
BILIRUBIN URINE: NEGATIVE
BLOOD, URINE: ABNORMAL
BUN BLDV-MCNC: 14 MG/DL (ref 8–23)
CALCIUM SERPL-MCNC: 8.6 MG/DL (ref 8.5–9.9)
CHLORIDE BLD-SCNC: 102 MEQ/L (ref 95–107)
CLARITY: CLEAR
CO2: 24 MEQ/L (ref 20–31)
COLOR: ABNORMAL
CREAT SERPL-MCNC: 0.72 MG/DL (ref 0.5–0.9)
EPITHELIAL CELLS, UA: ABNORMAL /HPF (ref 0–5)
GFR AFRICAN AMERICAN: >60
GFR NON-AFRICAN AMERICAN: >60
GLUCOSE BLD-MCNC: 105 MG/DL (ref 70–99)
GLUCOSE URINE: NEGATIVE MG/DL
HCT VFR BLD CALC: 30 % (ref 37–47)
HEMOGLOBIN: 9.9 G/DL (ref 12–16)
HYALINE CASTS: ABNORMAL /HPF (ref 0–5)
KETONES, URINE: NEGATIVE MG/DL
LEUKOCYTE ESTERASE, URINE: ABNORMAL
MCH RBC QN AUTO: 28.7 PG (ref 27–31.3)
MCHC RBC AUTO-ENTMCNC: 32.9 % (ref 33–37)
MCV RBC AUTO: 87.1 FL (ref 82–100)
NITRITE, URINE: NEGATIVE
PDW BLD-RTO: 14.7 % (ref 11.5–14.5)
PH UA: 5 (ref 5–9)
PLATELET # BLD: 266 K/UL (ref 130–400)
POTASSIUM SERPL-SCNC: 4.5 MEQ/L (ref 3.4–4.9)
PROTEIN UA: 30 MG/DL
RBC # BLD: 3.44 M/UL (ref 4.2–5.4)
RBC UA: >100 /HPF (ref 0–5)
SODIUM BLD-SCNC: 137 MEQ/L (ref 135–144)
SPECIFIC GRAVITY UA: 1.01 (ref 1–1.03)
URINE REFLEX TO CULTURE: ABNORMAL
UROBILINOGEN, URINE: 0.2 E.U./DL
WBC # BLD: 9.1 K/UL (ref 4.8–10.8)
WBC UA: ABNORMAL /HPF (ref 0–5)

## 2021-03-01 PROCEDURE — 6370000000 HC RX 637 (ALT 250 FOR IP): Performed by: INTERNAL MEDICINE

## 2021-03-01 PROCEDURE — 97535 SELF CARE MNGMENT TRAINING: CPT

## 2021-03-01 PROCEDURE — 51798 US URINE CAPACITY MEASURE: CPT

## 2021-03-01 PROCEDURE — 80048 BASIC METABOLIC PNL TOTAL CA: CPT

## 2021-03-01 PROCEDURE — 6370000000 HC RX 637 (ALT 250 FOR IP): Performed by: PHYSICAL MEDICINE & REHABILITATION

## 2021-03-01 PROCEDURE — 81001 URINALYSIS AUTO W/SCOPE: CPT

## 2021-03-01 PROCEDURE — 1180000000 HC REHAB R&B

## 2021-03-01 PROCEDURE — 6370000000 HC RX 637 (ALT 250 FOR IP): Performed by: THORACIC SURGERY (CARDIOTHORACIC VASCULAR SURGERY)

## 2021-03-01 PROCEDURE — 99233 SBSQ HOSP IP/OBS HIGH 50: CPT | Performed by: PHYSICAL MEDICINE & REHABILITATION

## 2021-03-01 PROCEDURE — 2700000000 HC OXYGEN THERAPY PER DAY

## 2021-03-01 PROCEDURE — 97116 GAIT TRAINING THERAPY: CPT

## 2021-03-01 PROCEDURE — 97530 THERAPEUTIC ACTIVITIES: CPT

## 2021-03-01 PROCEDURE — 97112 NEUROMUSCULAR REEDUCATION: CPT

## 2021-03-01 PROCEDURE — 85027 COMPLETE CBC AUTOMATED: CPT

## 2021-03-01 PROCEDURE — 36415 COLL VENOUS BLD VENIPUNCTURE: CPT

## 2021-03-01 RX ORDER — FUROSEMIDE 20 MG/1
20 TABLET ORAL DAILY
Qty: 60 TABLET | Refills: 3 | Status: SHIPPED | OUTPATIENT
Start: 2021-03-02 | End: 2021-03-31 | Stop reason: SDUPTHER

## 2021-03-01 RX ORDER — DIGOXIN 125 MCG
125 TABLET ORAL DAILY
Qty: 30 TABLET | Refills: 3 | Status: SHIPPED | OUTPATIENT
Start: 2021-03-02 | End: 2021-03-31 | Stop reason: SDUPTHER

## 2021-03-01 RX ORDER — FERROUS SULFATE 325(65) MG
325 TABLET ORAL 2 TIMES DAILY WITH MEALS
Qty: 30 TABLET | Refills: 3 | Status: SHIPPED | OUTPATIENT
Start: 2021-03-01

## 2021-03-01 RX ORDER — BUDESONIDE AND FORMOTEROL FUMARATE DIHYDRATE 160; 4.5 UG/1; UG/1
2 AEROSOL RESPIRATORY (INHALATION) 2 TIMES DAILY
Qty: 1 INHALER | Refills: 3 | Status: SHIPPED | OUTPATIENT
Start: 2021-03-01

## 2021-03-01 RX ORDER — CLOTRIMAZOLE 1 %
CREAM (GRAM) TOPICAL 2 TIMES DAILY
Status: DISCONTINUED | OUTPATIENT
Start: 2021-03-01 | End: 2021-03-02 | Stop reason: HOSPADM

## 2021-03-01 RX ORDER — SIMETHICONE 80 MG
80 TABLET,CHEWABLE ORAL EVERY 6 HOURS PRN
Qty: 180 TABLET | Refills: 3 | Status: ON HOLD | OUTPATIENT
Start: 2021-03-01 | End: 2022-07-19

## 2021-03-01 RX ORDER — PANTOPRAZOLE SODIUM 40 MG/1
40 TABLET, DELAYED RELEASE ORAL
Qty: 30 TABLET | Refills: 3 | Status: SHIPPED | OUTPATIENT
Start: 2021-03-01 | End: 2021-03-31 | Stop reason: SDUPTHER

## 2021-03-01 RX ORDER — CARVEDILOL 3.12 MG/1
3.12 TABLET ORAL 2 TIMES DAILY
Qty: 60 TABLET | Refills: 3 | Status: SHIPPED | OUTPATIENT
Start: 2021-03-01 | End: 2021-03-31 | Stop reason: SDUPTHER

## 2021-03-01 RX ORDER — NITROGLYCERIN 0.4 MG/1
TABLET SUBLINGUAL
Qty: 25 TABLET | Refills: 3 | Status: SHIPPED | OUTPATIENT
Start: 2021-03-01

## 2021-03-01 RX ADMIN — CARVEDILOL 3.12 MG: 3.12 TABLET, FILM COATED ORAL at 09:12

## 2021-03-01 RX ADMIN — LACTOBACILLUS TAB 2 TABLET: TAB at 17:40

## 2021-03-01 RX ADMIN — CYANOCOBALAMIN TAB 500 MCG 1000 MCG: 500 TAB at 09:12

## 2021-03-01 RX ADMIN — FUROSEMIDE 20 MG: 20 TABLET ORAL at 09:18

## 2021-03-01 RX ADMIN — LEVOTHYROXINE SODIUM 88 MCG: 88 TABLET ORAL at 05:23

## 2021-03-01 RX ADMIN — POLYETHYLENE GLYCOL 3350 17 G: 17 POWDER, FOR SOLUTION ORAL at 09:16

## 2021-03-01 RX ADMIN — CITALOPRAM HYDROBROMIDE 20 MG: 20 TABLET ORAL at 09:12

## 2021-03-01 RX ADMIN — LACTOBACILLUS TAB 2 TABLET: TAB at 09:12

## 2021-03-01 RX ADMIN — DIGOXIN 125 MCG: 125 TABLET ORAL at 09:10

## 2021-03-01 RX ADMIN — FERROUS SULFATE TAB 325 MG (65 MG ELEMENTAL FE) 325 MG: 325 (65 FE) TAB at 17:41

## 2021-03-01 RX ADMIN — PANTOPRAZOLE SODIUM 40 MG: 40 TABLET, DELAYED RELEASE ORAL at 17:41

## 2021-03-01 RX ADMIN — PANTOPRAZOLE SODIUM 40 MG: 40 TABLET, DELAYED RELEASE ORAL at 05:23

## 2021-03-01 RX ADMIN — ASPIRIN 81 MG: 81 TABLET, CHEWABLE ORAL at 09:11

## 2021-03-01 RX ADMIN — SULFAMETHOXAZOLE AND TRIMETHOPRIM 1 TABLET: 800; 160 TABLET ORAL at 09:19

## 2021-03-01 RX ADMIN — FERROUS SULFATE TAB 325 MG (65 MG ELEMENTAL FE) 325 MG: 325 (65 FE) TAB at 09:11

## 2021-03-01 RX ADMIN — RIVAROXABAN 15 MG: 15 TABLET, FILM COATED ORAL at 17:40

## 2021-03-01 RX ADMIN — POTASSIUM CHLORIDE 20 MEQ: 20 TABLET, EXTENDED RELEASE ORAL at 09:11

## 2021-03-01 ASSESSMENT — PAIN DESCRIPTION - DESCRIPTORS
DESCRIPTORS: PATIENT UNABLE TO DESCRIBE
DESCRIPTORS: ACHING

## 2021-03-01 ASSESSMENT — PAIN SCALES - GENERAL
PAINLEVEL_OUTOF10: 5
PAINLEVEL_OUTOF10: 0

## 2021-03-01 ASSESSMENT — PAIN DESCRIPTION - ORIENTATION: ORIENTATION: LOWER

## 2021-03-01 ASSESSMENT — PAIN DESCRIPTION - FREQUENCY: FREQUENCY: INTERMITTENT

## 2021-03-01 ASSESSMENT — PAIN DESCRIPTION - PAIN TYPE: TYPE: ACUTE PAIN

## 2021-03-01 NOTE — CARE COORDINATION
[address] [place] [address] NOTE  [facility]  Admit Date: 2021       Date: 3/1/2021  Patient Name: Donnamaria Kanner        MRN: [id number]    : 1932  (80 y.o.)  Gender: female        REHAB DIAGNOSIS:   Diagnosis: Impaired mobility & ADL's d/t progressive toxic encephalopathy 2* CHF & COPD exacerbation 2021    CO MORBIDITIES:  Pleural effusions      Past Medical History:   Diagnosis Date    Ascending aortic aneurysm ([facility] [address] 2017    Charcot Arleen Tooth muscular atrophy     Depression     Descending aortic aneurysm ([facility] [address] 2017    Essential hypertension 2018    Headache     HTN (hypertension)     Impaired mobility and activities of daily living     Lumbar stenosis with neurogenic claudication     Myelopathy ([facility] [address]     Osteoarthritis      Past Surgical History:   Procedure Laterality Date    JOINT REPLACEMENT Bilateral     knees    PLEURAL CATH INSERTION Left 2021    LEFT PLEURAL CATHETER INSERTION performed by True Hodgkins, MD at [address] Left 2021    total of 755 cc removed per Dr Kate Baker specimen sent to lab     Chart Reviewed: Yes  Family / Caregiver Present: No  General Comment  Comments: pt states \"my infection\" and rubs abdomen\"  Restrictions  Restrictions/Precautions: Fall Risk  CASE MANAGEMENT    Social/Functional History  Social/Functional History  Lives With: Alone  Type of Home: House  Home Layout: One level  Home Access: Stairs to enter with rails  Entrance Stairs - Rails: Both  Bathroom Shower/Tub: Tub/Shower unit  Bathroom Equipment: Grab bars in shower, Shower chair  Home Equipment: Rolling walker, Cane  ADL Assistance: Independent  Homemaking Assistance: Independent  Homemaking Responsibilities: Yes  Ambulation Assistance: Independent  Transfer Assistance: Independent  Active : No  Additional Comments: Son stops over 2 times daily       Pts personal preferences: [place] speaking    Pts assets/resources/support system: son    COVERAGE INFORMATION:Payor: MEDICARE / Plan: MEDICARE PART A AND B / Product Type: *No Product type* /       NURSING  Weight: 161 lb (73 kg) / Body mass index is 28.52 kg/m². DIET GENERAL; Low Sodium (2 GM)  Dietary Nutrition Supplements: Clear Liquid Oral Supplement  Dietary Nutrition Supplements: Frozen Oral Supplement    SpO2: 93 % (03/01/21 0941)  O2 Flow Rate (L/min): 3 L/min (03/01/21 7269)  No active isolations    Skin Issues: Yes and plurex drain, blisters from tape    Pain Managed: Yes    Bladder continence: Yes taylor removed, PVR    Bowel continence: Yes      Other: on O2 does not have at home, do HS SPO2      PHYSICAL THERAPY  Bed mobility:  Supine to Sit: Modified independent (02/26/21 1315)  Sit to Supine: Stand by assistance (02/24/21 1405)  Transfers:  Sit to Stand: Stand by assistance;Contact guard assistance (03/01/21 0944)  Bed to Chair: Stand by assistance (02/20/21 1434)  Gait:   Device: Rolling Walker (03/01/21 0115)  Other Apparatus: O2 (03/01/21 0951)  Assistance: Stand by assistance (03/01/21 0951)  Distance: 120 feet (03/01/21 0951)  Quality of Gait: steppage gt pattern, mild trunk flexion, redirectable with change in direction.  (03/01/21 1916)  Comments: sob with all activity (03/01/21 0951)  Stairs:  # Steps : 4 (02/28/21 1312)  Rails: Bilateral (02/28/21 1312)  Assistance: Contact guard assistance;Stand by assistance (02/28/21 1312)  Comment: Non reciprocal pattern Increased SOB SpO2 96% post stair negotiation (02/23/21 1345)  W/C mobility:     LTG:  Long term goal 1: Pt will perform bed mobility with indep  Long term goal 2: Pt will perform functional transfers with indep  Long term goal 3: Pt will ambulate >/=50ft with 2ww and mod I  Long term goal 4: Pt will negotiate 4 steps with handrail and SBA  Long term goal 5: Pt will perform >/= 23/56 for Bernard balance  PT Treatment Time:  1.5 hrs      OCCUPATIONAL THERAPY  Hand Dominance: Right  ADL  Feeding: Modified independent  (03/01/21 1143)  Grooming: Modified independent  (03/01/21 1143)  UE Bathing: Modified independent  (03/01/21 1143)  LE Bathing: Supervision (03/01/21 1143)  UE Dressing: Modified independent  (03/01/21 1143)  LE Dressing: Modified independent  (03/01/21 1143)  Toileting: Modified independent  (03/01/21 1143)  Additional Comments: pt completed shower ADL at the above level. (02/22/21 1148)  Toilet Transfers  Toilet - Technique: Ambulating (03/01/21 1145)  Equipment Used: Grab bars (03/01/21 1145)  Toilet Transfer: Modified independent (03/01/21 1145)  Toilet Transfers Comments: ww (03/01/21 1145)  Tub Transfers  Tub Transfers: Not tested (02/20/21 3772)  Shower Transfers  Shower - Transfer From: Dilia Zhou (03/01/21 1145)  Shower - Transfer Type: To and From (03/01/21 1145)  Shower - Transfer To:  Shower seat with back (03/01/21 1145)  Shower - Technique: Ambulating (03/01/21 1145)  Shower Transfers: Modified independence (03/01/21 1145)  Shower Transfers Comments: grab bars (03/01/21 1145)  LTG:  Eating  Assistance Needed: Setup or clean-up assistance  Comment: food cut, packages open  CARE Score: 5  Discharge Goal: Set-up or clean-up assistance, Oral Hygiene  Assistance Needed: Setup or clean-up assistance  CARE Score: 5  Discharge Goal: Set-up or clean-up assistance, Toileting Hygiene  Assistance Needed: Partial/moderate assistance  CARE Score: 3  Discharge Goal: Supervision or touching assistance, Shower/Bathe Self  Assistance Needed: Partial/moderate assistance  CARE Score: 3  Discharge Goal: Supervision or touching assistance  Upper Body Dressing  Assistance Needed: Supervision or touching assistance  CARE Score: 4  Discharge Goal: Set-up or clean-up assistance, Lower Body Dressing  Assistance Needed: Partial/moderate assistance  CARE Score: 3  Discharge Goal: Supervision or touching assistance, Putting On/Taking Off Footwear  Assistance Needed: Substantial/maximal assistance  CARE Score: 2  Discharge Goal: Partial/moderate assistance, Toilet Transfer  Assistance Needed: Partial/moderate assistance  CARE Score: 3  Discharge Goal: Supervision or touching assistance  OT Treatment Time: 1.5 hrs      SPEECH THERAPY                 Diet/Swallow:                       LTG:                COGNITION  OT: Cognition Comment: comp: Sup express: TERENCE soc int: TERENCE prob solv: Lorri mem: Lorri  SP:        RECREATIONAL THERAPY  Attendance to recreational therapy programs:    []  Pet Therapy  [] Music Therapy  [] Art Therapy    [] Recreation Therapy Group [] Support Group           Patient social interaction (mood, participation): good      Patient strengths: good support    Patients goal: to go home    Problems/Barriers: lives alone        1. Safety:          - Intervention / Plan:    [x]  falls protocol     [x]  PT/OT    []  SP        - Results:         2. Potential DME needs:         - Intervention / Plan:  [x]  PT/OT     [x]  Assess equipment needs/access       - Results:         3. Weakness:          - Intervention / Plan:  [x]  PT/OT      []  Other:         - Results:         4. Discharge planning needs:          - Intervention / Plan:  [x]  Weekly team conference      [x]  family training        - Results:         5.            - Intervention / Plan:          - Results:         6.            - Intervention / Plan:         - Results:         7.            - Intervention / Plan:         - Results:           Discharge Plan   Estimated Length of Stay: 11 days    Tentative Discharge date: 3/2/21      Anticipated Discharge Destination:  Home      Team recommendations:    1. Follow up Therapy :    PT  OT  RN  Social Work  [facility] Aide     2. Home Health    Other:     Equipment needed at Discharge:  Other: TBD      Team Members Present at Conference:    Physician: Dr. Carolina Lou  : Boyd Hunter RN  RN: Rere Razo RN  Physical Therapist: Carlos Rhodes PT  Occupational Therapist:Jennifer Lee  Speech Therapist: Eda Barney, SLP  Nurse Manager: Ralph Pierce RN     Electronically signed by Maria Mahmood RN on 3/1/2021 at 2:57 PM

## 2021-03-01 NOTE — PLAN OF CARE
Problem: Falls - Risk of:  Goal: Will remain free from falls  Description: Will remain free from falls  Outcome: Ongoing  Goal: Absence of physical injury  Description: Absence of physical injury  Outcome: Ongoing     Problem: Mobility - Impaired:  Goal: Mobility will improve  Description: Mobility will improve  Outcome: Ongoing     Problem: Skin Integrity:  Goal: Will show no infection signs and symptoms  Description: Will show no infection signs and symptoms  Outcome: Ongoing  Goal: Absence of new skin breakdown  Description: Absence of new skin breakdown  Outcome: Ongoing     Problem: Pain:  Goal: Pain level will decrease  Description: Pain level will decrease  Outcome: Ongoing  Goal: Control of acute pain  Description: Control of acute pain  Outcome: Ongoing  Goal: Control of chronic pain  Description: Control of chronic pain  Outcome: Ongoing     Problem: Nutrition  Goal: Optimal nutrition therapy  Outcome: Ongoing

## 2021-03-01 NOTE — PROGRESS NOTES
Physical Therapy Rehab Treatment Note  Facility/Department: [facility]  [facility]       NAME: Santo Donald  : 1932 (80 y.o.)  MRN: [id number]  CODE STATUS: Full Code    Date of Service: 3/1/2021  Chart Reviewed: Yes  Family / Caregiver Present: No  General Comment  Comments: pt states \"my infection\" and rubs abdomen\"    Restrictions:  Restrictions/Precautions: Fall Risk       SUBJECTIVE: Subjective: pt sitting edge of bed when i arrived, agreeable to tx  Pain Screening  Patient Currently in Pain: Yes  Pre Treatment Pain Screening  Pain at present: 5  Scale Used: Faces    Post Treatment Pain Screenin  Pain Assessment  Pain Assessment: Faces  Pain Level: 5  Pain Type: Acute pain  Pain Location: Back  Pain Orientation: Lower  Pain Descriptors: Aching  Pain Frequency: Intermittent  Notified Dr. Bernadette Odell about catheter pain when she made rounds. OBJECTIVE:   Overall Orientation Status: Within Functional Limits              Bed mobility: independent        Transfers  Sit to Stand: Stand by assistance;Contact guard assistance  Comment: continues to struggle with approach to chair due to fatigue. Ambulation  Ambulation?: Yes  Ambulation 1  Surface: carpet  Device: Rolling Walker  Other Apparatus: O2  Assistance: Stand by assistance  Quality of Gait: steppage gt pattern, mild trunk flexion, redirectable with change in direction. Gait Deviations: Slow Lea; Increased JAMES  Distance: 120 feet  Comments: sob with all activity  Steps NT due to time constraints. Neuro: around bolsters with ww. Static standing at ww. Therex: seated laq and marches x 20. X 2 sets. ASSESSMENT/COMMENTS:sob with all activity. 93% to 98% on 3L02. Pt expressed frustration that she has been in the hospital for a month now. Son to come in at 1 for training.         PLAN OF CARE/Safety: ongoing         Therapy Time:   Individual   Time In 0900   Time Out 1000   Minutes 61     Minutes:60 Transfer/Bed mobility trainin      Gait training:10      Neuro re education:15     Therapeutic ex:10      Beau Lundy, JERRICA, 21 at 9:55 AM

## 2021-03-01 NOTE — PROGRESS NOTES
INDIVIDUALIZED OVERALL REHAB PLAN OF CARE  ADDENDUM TO REHAB PROGRESS NOTE-for audit purposes must also refer to this day's clinical note and combine the information      Date: 3/1/2021  Patient Name: Kristy Lopez   Room: Q345/N961-42    MRN: [id number]    : 1932  (80 y.o.)  Gender: female       Today 3/1/2021 during weekly team meeting, I reviewed the patient Kristy Lopez in detail with the therapists and nurses involved in patient's care gathering complex physiatric data regarding current medical issues, progress in therapies, factors limiting progress, social issues, psychological issues, ongoing therapeutic plans and discharge planning. Legend:  I= independent Im =Modified independent  S=Supervised SB=stand by MAGANA=set up CG=contact fran Min= minimal Mod=Moderate Max=maximal Max of 2 =maximal assist of 2 people      CURRENT FUNCTIONAL STATUS:    NURSING ISSUES:      Complains of Taylor pain we will DC the Taylor as her UA is negative. Pt is alert and oriented x 3. Denies any pain. Generalized weakness. 3L O2 via NC. SOB with ambulation. Fall precautions in place with bed alarm on. UTI- no adverse reactions noted to Bactrim. Taylor catheter in place--taylor care done-yellow, cloudy, malodorous urine. Pt walks with a walker SBA. Continent of bowel. Pt c/o fullness, cramping and passing flatus- prn Mylicon. LBM 3/1/21. Dressing intact to left chest pleurx drain. Blisters noted from tape allergy- cream applied. Pt slept well  Nursing will continue to focus on bowel and bladder continence transitioning toward independence by time of discharge. Monitoring post void residuals monitoring for severe constipation and bowel obstruction. Focus on achieving ADL goals with co-treating with OT when possible.     PHYSICAL THERAPY  Bed mobility:  Supine to Sit: Modified independent (21 1315)  Sit to Supine: Stand by assistance (21 1405)  Transfers:  Sit to Stand: Stand by assistance;Contact guard assistance (03/01/21 0944)  Bed to Chair: Stand by assistance (02/20/21 1434)  Gait:   Device: Rolling Walker (03/01/21 8058)  Other Apparatus: O2 (03/01/21 0951)  Assistance: Stand by assistance (03/01/21 0951)  Distance: 120 feet (03/01/21 0951)  Quality of Gait: steppage gt pattern, mild trunk flexion, redirectable with change in direction. (03/01/21 1483)  Comments: sob with all activity (03/01/21 0951)  Stairs:  # Steps : 4 (02/28/21 1312)  Rails: Bilateral (02/28/21 1312)  Assistance: Contact guard assistance;Stand by assistance (02/28/21 1312)  Comment: Non reciprocal pattern Increased SOB SpO2 96% post stair negotiation (02/23/21 1345)  W/C mobility:         OCCUPATIONAL THERAPY  Hand Dominance: Right  ADL  Feeding: Modified independent  (03/01/21 1143)  Grooming: Modified independent  (03/01/21 1143)  UE Bathing: Modified independent  (03/01/21 1143)  LE Bathing: Supervision (03/01/21 1143)  UE Dressing: Modified independent  (03/01/21 1143)  LE Dressing: Modified independent  (03/01/21 1143)  Toileting: Modified independent  (03/01/21 1143)  Additional Comments: pt completed shower ADL at the above level. (02/22/21 1148)  Toilet Transfers  Toilet - Technique: Ambulating (03/01/21 1145)  Equipment Used: Grab bars (03/01/21 1145)  Toilet Transfer: Modified independent (03/01/21 1145)  Toilet Transfers Comments: ww (03/01/21 1145)  Tub Transfers  Tub Transfers: Not tested (02/20/21 1472)  Shower Transfers  Shower - Transfer From: Truongmoustapha Inderjit (03/01/21 1145)  Shower - Transfer Type: To and From (03/01/21 1145)  Shower - Transfer To:  Shower seat with back (03/01/21 1145)  Shower - Technique: Ambulating (03/01/21 1145)  Shower Transfers: Modified independence (03/01/21 1145)  Shower Transfers Comments: ravin gonzalez (03/01/21 1145)      SPEECH THERAPY               Diet/Swallow:                           COGNITION  OT: Cognition Comment: comp: Sup express: TERENCE soc int: TERENCE prob solv: Lorri mem: Lorri  SP: THERAPY, MEDICAL AND NURSING COORDINATION:    [x]  Pain medication before therapies     []  Check orthostatic BP      [x]  Ambulate to the bathroom in room    [x]  Add scheduled rest beaks     []  In room therapies      Discharge date set for:              3/2/21      Home with:   alone  with help from   Son and dtr            And:      Home Health Care:     [x]  PT    [x]  OT    []  ST   [x]  Aide   []  SW    [x]  RN                    Outpatient Therapy:  []  PT    []  OT    []  ST   []  Rehab Psych                 Equipment:  [employer]      At D/C their function is goaled at:   PT:Long term goal 1: Pt will perform bed mobility with indep  Long term goal 2: Pt will perform functional transfers with indep  Long term goal 3: Pt will ambulate >/=50ft with 2ww and mod I  Long term goal 4: Pt will negotiate 4 steps with handrail and SBA  Long term goal 5: Pt will perform >/= 23/56 for Bernard balance  OT:Eating  Assistance Needed: Setup or clean-up assistance  Comment: food cut, packages open  CARE Score: 5  Discharge Goal: Set-up or clean-up assistance, Oral Hygiene  Assistance Needed: Setup or clean-up assistance  CARE Score: 5  Discharge Goal: Set-up or clean-up assistance, Toileting Hygiene  Assistance Needed: Partial/moderate assistance  CARE Score: 3  Discharge Goal: Supervision or touching assistance, Shower/Bathe Self  Assistance Needed: Partial/moderate assistance  CARE Score: 3  Discharge Goal: Supervision or touching assistance  Upper Body Dressing  Assistance Needed: Supervision or touching assistance  CARE Score: 4  Discharge Goal: Set-up or clean-up assistance, Lower Body Dressing  Assistance Needed: Partial/moderate assistance  CARE Score: 3  Discharge Goal: Supervision or touching assistance, Putting On/Taking Off Footwear  Assistance Needed: Substantial/maximal assistance  CARE Score: 2  Discharge Goal: Partial/moderate assistance, Toilet Transfer  Assistance Needed: Partial/moderate assistance  CARE Score: 3  Discharge Goal: Supervision or touching assistance  SP:               From a cognitive standpoint they will need:        24 hr supervision  --progress to occasional           Significant problems/ barriers to functional progress include: Pt is at a high risk for functional loss,    [x]  Acute infection/UTI    []  Low BP's     []  COPD flare-up   []  Uncontrolled blood sugar     []  Progressive anemia         [x]  Severe pain exacerbation     []  Impaired mental status    []  Urinary incontinence    []  Bowel incontinence           Plan to correct barriers to functional progress: Add scheduled rest breaks, control pain by using ice Lidoderm rest and massage as well as pain medications prior to therapy. Based on a comprehensive evaluation of the above, the individualized therapy and Discharge plan will be:    -Times stated are an average that will be varied based on the patient's daily need. PT  1 1/2  hrs/day 5-7 days per week           OT  1 1/2hrs per day 5-7 days per week         Estimated LOS 2 week(s)    - Overall functional prognosis:     [x]  Good    []  Fair    []  Poor -Medical Prognosis:   [x]  Good    []  Fair    []  Poor    This patient was made aware of the discussion of Plan of Care, their projected dicharge date and their projected function at discharge.        Cristiane River DO

## 2021-03-01 NOTE — PROGRESS NOTES
Subjective: The patient complains of  moderate to severe acute    CHF partially relieved by rest, PT, OT,   and exacerbated by recent illness and exertion. I am concerned about patients  frailty and bleeding risk on Xarelto. She is status post left Pleurex catheter placement doing well postoperatively. She will need to restart her Xarelto. According to recent nursing note, \"  Pt is alert and oriented x 3. Denies any pain. Generalized weakness. 3L O2 via NC. SOB with ambulation. Fall precautions in place with bed alarm on. UTI- no adverse reactions noted to Bactrim. Taylor catheter in place--taylor care done-yellow, cloudy, malodorous urine. Pt walks with a walker SBA. Continent of bowel. Pt c/o fullness, cramping and passing flatus- prn Mylicon. LBM 3/1/21. Dressing intact to left chest pleurx drain. Blisters noted from tape allergy- cream applied. Pt slept well \"    ROS x10: The patient also complains of severely impaired mobility and activities of daily living. Otherwise no new problems with vision, hearing, nose, mouth, throat, dermal, cardiovascular, GI, , pulmonary, musculoskeletal, psychiatric or neurological. See Rehab H&P on Rehab chart dated . Vital signs:  /73   Pulse 71   Temp 98 °F (36.7 °C) (Oral)   Resp 15   Ht 5' 3\" (1.6 m)   Wt 161 lb (73 kg)   SpO2 96%   BMI 28.52 kg/m²   I/O:   PO/Intake:  fair PO intake, no problems observed or reported. Bowel/Bladder:   UTI Bactrim, constipation  ,taylor for retention, constipation  General:  Patient is well developed, adequately nourished, non-obese and     well kempt. HEENT:    PERRLA, hearing intact to loud voice, external inspection of ear     and nose benign. Inspection of lips, tongue and gums benign  Musculoskeletal: No significant change in strength or tone. All joints stable. Inspection and palpation of digits and nails show no clubbing,       cyanosis or inflammatory conditions.    Neuro/Psychiatric: Affect: flat.  Alert and oriented to person, place and     situation. No significant change in deep tendon reflexes or     sensation  Lungs:  Diminished, CTA-B. Respiration effort is normal at rest.     Heart:   S1 = S2, RRR. No loud murmurs. Abdomen:  Mildly distended with gas, soft, non-tender, no enlargement of liver or spleen. Extremities:  No significant lower extremity edema or tenderness. Skin:   Intact to general survey, healing left Pleurex catheter    Rehabilitation:  Physical therapy: FIMS:  Bed Mobility: Scooting: Supervision    Transfers: Sit to Stand: Contact guard assistance, Stand by assistance  Stand to sit: Contact guard assistance, Stand by assistance  Bed to Chair: Stand by assistance, Ambulation 1  Surface: carpet  Device: Rolling Walker  Other Apparatus: O2  Assistance: Stand by assistance  Quality of Gait: Steppage gait, fatigued at 100 ft  Gait Deviations: Slow Lea, Increased JAMES  Distance: 115ft  Comments: sats 95% after ambulation, pt visibly SOB, Stairs  # Steps : 4  Stairs Height: 6\"  Rails: Bilateral  Device: No Device  Assistance: Contact guard assistance, Stand by assistance  Comment: Non reciprocal pattern Increased SOB SpO2 96% post stair negotiation    FIMS:  ,  , Assessment: Pt able to maintain O2 sats WNLs however continues to display SOB. Mild impulsive throughout session, decreased O2 line management and awareness observed. Significantly challenged with NBOS activities requiring UE assist.    Occupational therapy: FIMS:   ,  , Assessment: Pt. continues to demonstrate decreased  and decreased UE endurance. Pt. requires increased time and demonstrates continued need for rest breaks for B hands. Pt. continues to benefit from OT to maximize independence with ADL tasks.     Speech therapy: FIMS:        Lab/X-ray studies reviewed, analyzed and discussed with patient and staff:   Recent Results (from the past 24 hour(s))   Basic Metabolic Panel    Collection Time: 03/01/21  5:22 AM   Result Value Ref Range    Sodium 137 135 - 144 mEq/L    Potassium 4.5 3.4 - 4.9 mEq/L    Chloride 102 95 - 107 mEq/L    CO2 24 20 - 31 mEq/L    Anion Gap 11 9 - 15 mEq/L    Glucose 105 (H) 70 - 99 mg/dL    BUN 14 8 - 23 mg/dL    CREATININE 0.72 0.50 - 0.90 mg/dL    GFR Non-African American >60.0 >60    GFR  >60.0 >60    Calcium 8.6 8.5 - 9.9 mg/dL   CBC    Collection Time: 03/01/21  5:23 AM   Result Value Ref Range    WBC 9.1 4.8 - 10.8 K/uL    RBC 3.44 (L) 4.20 - 5.40 M/uL    Hemoglobin 9.9 (L) 12.0 - 16.0 g/dL    Hematocrit 30.0 (L) 37.0 - 47.0 %    MCV 87.1 82.0 - 100.0 fL    MCH 28.7 27.0 - 31.3 pg    MCHC 32.9 (L) 33.0 - 37.0 %    RDW 14.7 (H) 11.5 - 14.5 %    Platelets 894 650 - 119 K/uL       Previous extensive, complex labs, notes and diagnostics reviewed and analyzed. ALLERGIES:    Allergies as of 02/19/2021 - Review Complete 02/19/2021   Allergen Reaction Noted    Latex  07/19/2017    Tape [adhesive tape]  06/28/2016      (please also verify by checking MAR)      Today I evaluated this patient for periodic reassessment of medical and functional status. The patient was discussed in detail at the treatment team meeting focusing on current medical issues, progress in therapies, social issues, psychological issues, barriers to progress and strategies to address these barriers, and discharge planning. See the addendum to rehab progress note-as a second progress note in the chart. The patient continues to be high risk for future disability and their medical and rehabilitation prognosis continue to be good and therefore, we will continue the patient's rehabilitation course as planned. The patient's tentative discharge date was set. Patient and family education was discussed. The patient was made aware of the team discussion regarding their progress. Complex Physical Medicine & Rehab Issues Assess & Plan:   1.  Severe abnormality of gait and mobility and impaired self-care and ADL's secondary to progressive weakness dt   CHF . Functional and medical status reassessed regarding patients ability to participate in therapies and patient found to be able to participate in acute intensive comprehensive inpatient rehabilitation program including PT/OT to improve balance, ambulation, ADLs, and to improve the P/AROM. Therapeutic modifications regarding activities in therapies, place, amount of time per day and intensity of therapy made daily. In bed therapies or bedside therapies prn.   2. Bowel progressive constipation, and Bladder dysfunction monitoring neurogenic bladder:  frequent toileting, ambulate to bathroom with assistance, check post void residuals. Check for C.difficile x1 if >2 loose stools in 24 hours, continue bowel & bladder program.  Monitor bowel and bladder function. Lactinex 2 PO every AC. MOM prn, Brown Bomb prn, Glycerin suppository prn, enema prn. Add lactobacillus and lactulose. 3. Severe lbp pain as well as generalized OA pain: reassess pain every shift and prior to and after each therapy session, give prn medications, modalities prn in therapy, Lidoderm, K-pad prn.   4. Skin healing left Pleurx catheter incision and breakdown risk:  continue pressure relief program.  Daily skin exams and reports from nursing. 5. Severe fatigue due to nutritional and hydration deficiency: Add vitamin B12 vitamin D and CoQ10 continue to monitor I&Os, calorie counts prn, dietary consult prn. Add healthy HS snack. 6. Acute episodic insomnia with situational adjustment disorder:  prn Ambien, monitor for day time sedation. Add HS \"Tuck In\"  7. Falls risk elevated:  patient to use call light to get nursing assistance to get up, bed and chair alarm. 8. Elevated DVT risk: progressive activities in PT, continue prophylaxis PORTILLO hose, elevation and Xarelto will be on hold pending thoracic surgery.   9. Complex discharge planning: Discharge home alone 3/2/21 with help from her son and daughter as well as home health care. Progress toward her final weekly team meeting  Monday to assess progress towards goals, discuss and address social, psychological and medical comorbidities and to address difficulties they may be having progressing in therapy. Patient and family education is in progress. The patient is to follow-up with their family physician after discharge. Complex Active General Medical Issues that complicate care Assess & Plan:     1. Principal Problem:    Impaired mobility and activities of daily living due to CHF [facility] admit 2021  Active Problems:  1. Paroxysmal atrial fibrillation,  Essential hypertension with low blood pressures of late, recurrent pleural effusions acute on chronic combined systolic and diastolic CHF (congestive heart failure)-continue blood signs every shift focusing on heart rate and blood pressure checks, consult hospitalist for backup medical and adjust/add medications (aspirin, Xarelto, Coreg, Cardizem, Lasix, Nitrostat) plan as above consult cardiology titrate IV Lasix recheck chest x-ray wean high flow oxygen patient  requires a VATS procedure-scheduled for 2/25/2021. .  Midodrine titration add abdominal binders and teds  2. Acute severe UTI-Bactrim and monitor for residual urine in the bladder dose Bactrim with food add IM Rocephin check urine culture check postvoid residuals  3. Lumbar stenosis with neurogenic claudication  4. Charcot Arleen Tooth muscular atrophy, Osteoarthritis of lumbar spine with myelopathy,  Osteoarthritis  5. Severe depression and anxiety -emotional support provided daily, vitamin B12, encourage participation in rehabilitation support group and recreational therapy, adjust/add medications (Celexa Ativan)  6. Hypothyroidism-titrate Synthroid  7. Recurrent pleural effusion patient is SP - left Pleurx catheter insertion PA of 2/24/2021 with Dr. Mark Cheney.    Family and patient will need instruction on how to use it as an outpatient.     Mariana Topete D.O., PM&R     Attending    [address]

## 2021-03-01 NOTE — PROGRESS NOTES
Physical Therapy Rehab Treatment Note  [facility]/Department[facility]  [facility]       NAME: Jimi Rojas  : 1932 (80 y.o.)  MRN: [id number]  CODE STATUS: Full Code    Date of Service: 3/1/2021  Chart Reviewed: Yes  Family / Caregiver Present: Shara )  General Comment  Comments: pt no longer has catheter    Restrictions:  Restrictions/Precautions: Fall Risk       SUBJECTIVE: Subjective: Son Daiana Vasques present to train in PT this pm.  Pain Screening  Patient Currently in Pain: Yes  Pre Treatment Pain Screening  Pain at present: 5  Scale Used: Faces    Post Treatment Pain Screenin  Pain Assessment  Pain Assessment: Faces  Pain Level: 5  Pain Type: Acute pain  Pain Location: Abdomen  Pain Orientation: Lower  Pain Descriptors: Cramping  Pain Frequency: Intermittent    OBJECTIVE:   Overall Orientation Status: Within Functional Limits                     Transfers  Sit to Stand: Supervision;Stand by assistance  Stand to sit: Supervision;Stand by assistance  Car Transfer: Supervision  Comment: continues to struggle with approach to chair due to fatigue. Ambulation  Ambulation?: Yes  More Ambulation?: Yes  Ambulation 1  Surface: carpet  Device: Rolling Walker  Other Apparatus: O2  Assistance: Stand by assistance  Quality of Gait: steppage gt, difficulty with straight path, increased sob  Gait Deviations: Slow Lea; Increased JAMES  Distance: 120 feet  Comments: sob with all activity  Ambulation 2  Surface - 2: carpet  Device 2: Rolling Walker  Assistance 2: Stand by assistance;Supervision  Quality of Gait 2: slow and steady, increased fatigue. decreased safety with approach to chair  Gait Deviations: Slow Lea; Increased JAMES  Distance: 120 feet    Stairs/Curb  Stairs?: Yes  Stairs  # Steps : 4  Stairs Height: 6\"  Rails: Bilateral  Assistance: Stand by assistance;Contact guard assistance                    ASSESSMENT/COMMENTS:  PT Education: Transfer Training;Family Education;Gait Training  Patient Education: pts son Armando Lim present to train on gait, steps, and transfers. son reports pt is very stubborn and doesn't want to use a ww at home. States she is furniture walker at home. Pt denies to don braces here while on rehab. Son feels comfortable at the level his mother is and reports checking in with her daily. PLAN OF CARE/Safety: ongoing.          Therapy Time:   Individual   Time In 1330   Time Out 1400   Minutes 30     Minutes:30      Transfer/Bed mobility training:10      Gait trainin      Neuro re education:0     Therapeutic ex:0      Teresa Almanzar PTA, 21 at 4:37 PM

## 2021-03-01 NOTE — PROGRESS NOTES
Pt is alert and oriented x 3. Denies any pain. Generalized weakness. 3L O2 via NC. SOB with ambulation. Fall precautions in place with bed alarm on. UTI- no adverse reactions noted to Bactrim. Taylor catheter in place--taylor care done-yellow, cloudy, malodorous urine. Pt walks with a walker SBA. Continent of bowel. Pt c/o fullness, cramping and passing flatus- prn Mylicon. LBM 3/1/21. Dressing intact to left chest pleurx drain. Blisters noted from tape allergy- cream applied. Pt slept well.  Electronically signed by Enrique Palacios RN on 3/1/2021 at 5:31 AM

## 2021-03-01 NOTE — PROGRESS NOTES
Occupational Therapy  Facility/Department: [facility]  Daily Treatment Note  NAME: Waleska Chandler  : 1932  MRN: [id number]    Date of Service: 3/1/2021    Discharge Recommendations:  Continue to assess pending progress       Assessment      Activity Tolerance  Activity Tolerance: Patient Tolerated treatment well  Activity Tolerance: 3L O2  Safety Devices  Safety Devices in place: Yes  Type of devices: All fall risk precautions in place         Patient Diagnosis(es): There were no encounter diagnoses. has a past medical history of Ascending aortic aneurysm ([address] Charcot Arleen Tooth muscular atrophy, Depression, Descending aortic aneurysm ([address] Essential hypertension, Headache, HTN (hypertension), Impaired mobility and activities of daily living, Lumbar stenosis with neurogenic claudication, Myelopathy ([address] and Osteoarthritis. has a past surgical history that includes joint replacement (Bilateral); thoracentesis (Left, 2021); and Pleural Cath Insertion (Left, 2021). Restrictions  Restrictions/Precautions  Restrictions/Precautions: Fall Risk     Subjective   General  Chart Reviewed: Yes  Patient assessed for rehabilitation services?: Yes  Family / Caregiver Present: No  Diagnosis: Impaired mobility and ADLs d/t CHF exacerbation    Subjective  Subjective: \"Shower? . Now?.\"    Pain Assessment  Pain Type: Acute pain  Pain Location: Vagina(catheter pain)  Pain Descriptors: Patient unable to describe  Pre Treatment Pain Screening  Pain at present: 5  Intervention List: Patient able to continue with treatment;Nurse/Physician notified  Comments / Details: Felicitas Simmons and Arlene Vallejo RN notified     Orientation  Orientation  Overall Orientation Status: Within Normal Limits     Objective      ADL    Feeding: Modified independent     Grooming: Modified independent     UE Bathing: Modified independent     LE Bathing: Supervision    UE Dressing: Modified independent     LE Dressing: Modified independent Toileting: Modified independent     Toilet Transfers  Toilet - Technique: Ambulating  Equipment Used: Grab bars  Toilet Transfer: Modified independent  Toilet Transfers Comments: ww    Shower Transfers  Shower - Transfer From: Sally Favre - Transfer Type: To and From  Shower - Transfer To: Shower seat with back  Shower - Technique: Ambulating  Shower Transfers: Modified independence  Shower Transfers Comments: ravin bars          Plan   Plan  Times per week: 5-7x week  Plan weeks: 1-2  Current Treatment Recommendations: Strengthening, Safety Education & Training, Balance Training, Patient/Caregiver Education & Training, Self-Care / ADL, Functional Mobility Training, Neuromuscular Re-education, Endurance Training    Plan Comment: Continue per OT POC for planned d/c on 3-2-21         Goals  Patient Goals   Patient goals :  To go home       Therapy Time   Individual Concurrent Group Co-treatment   Time In 1000         Time Out 1100         Minutes 60            Variance: 5(chest tube drain and redressed by Serjio Diaz RN and Chong Pepe RN)    ADL trainin minutes  Missed 5 minutes due to with nursing for chest tube drain and redressed, will attempt makeup as able     Electronically signed by PRUDENCE Seymour on 3/1/21 at 11:47 AM EST    PRUDENCE Seymour

## 2021-03-01 NOTE — PROGRESS NOTES
Physical Therapy Rehab Treatment Note  Facility/Department: [facility]  Room: [place] [address]       NAME: Sanjuana Lockwood  : 1932 (80 y.o.)  MRN: [id number]  CODE STATUS: Full Code    Date of Service: 3/1/2021  Chart Reviewed: Yes  Family / Caregiver Present: No  General Comment  Comments: pt states \"my infection\" and rubs abdomen\"    Restrictions:  Restrictions/Precautions: Fall Risk       SUBJECTIVE: Subjective: pt sitting edge of bed when i arrived, agreeable to tx  Pain Screening  Patient Currently in Pain: Yes  Pre Treatment Pain Screening  Pain at present: 5  Scale Used: Faces    Post Treatment Pain Screenin  Pain Assessment  Pain Assessment: Faces  Pain Level: 5  Pain Type: Acute pain  Pain Location: Back  Pain Orientation: Lower  Pain Descriptors: Aching  Pain Frequency: Intermittent    OBJECTIVE:   Overall Orientation Status: Within Functional Limits                   Bed mobility: independent in and out of bed. Transfers  Sit to Stand: Stand by assistance;Contact guard assistance  Comment: continues to struggle with approach to chair due to fatigue. Ambulation  Ambulation?: Yes  Ambulation 1  Surface: carpet  Device: Rolling Walker  Other Apparatus: O2  Assistance: Stand by assistance  Quality of Gait: steppage gt pattern, mild trunk flexion, redirectable with change in direction. Gait Deviations: Slow Lea; Increased JAMES  Distance: 120 feet  Comments: sob with all activity  Steps: 4-6x 1 sba to cga       Neuro: static standing at ww. With and without ad. Bernard Balance 28/56  ASSESSMENT/COMMENTS:family training with son Lui Sheets. Pt to go home tomorrow. Pt still on 3L02. Called  to see if pt being dc with oxygen. Rest breaks needed in between activities. PLAN OF CARE/Safety: ongoing.          Therapy Time:   Individual   Time In 0900   Time Out 1000   Minutes 60     Minutes:60      Transfer/Bed mobility trainin      Gait trainin      Neuro re education:20 Therapeutic ex:0      Belén Kinney, JERRICA, 03/01/21 at 2:04 PM

## 2021-03-01 NOTE — PROGRESS NOTES
Hospitalist Progress Note  3/1/2021 12:36 PM    Assessment and Plan:   1. Generalized weakness, Gait instability and Decreased Functional Status secondary to recurrent pleural effuson: Underwent diagnostic and therapeutic thoracentesis (X2) on 2/16/2021 720ml removed. Fluids shows lymphocyte predominant pleural effusion. Thoracoscopic biopsy not recommended given age and poor health. Pleurx cath  placed on 2/25/2021 by thoracic surgery. Dressing dry and intact. Continue supplemental oxygen to maintain sats 90-92%. IS and acapella. Fall precautions. PT OT to evaluate. Maximize nutrition status. Assessing if needs DME at home. SW on board. 2. UTI in the setting of indwelling Taylor catheter: Today was day 8 of bactrim will discontinue UA ordered today by primary team. No indication for repeat UA  she is on treatment as there are no new symptoms or fevers, chills, or abdominal pain  3. PAF/HTN/CHF (combined): Now in NSR. Cardiology managing. EF 40%. Previous cath. On xarelto, coreg, digoxin, lasix. Daily EKGs  4. Hypothyroidism: Continue synthroid  5. Urinary retention: taylor catheter removed this am. Monitor urinary output. 6. GERD: Protonix daily  7. Hypotension: Improved with midodrine. Continue low-dose Coreg with parameters in place  8. HX provoked DVT: On xarelto  9. HX SVT: s/p ablation  10. Bowel Regimen and GI PPx: stool softners PRN ordered with hold parameters for loose stools or diarrhea. On antiacid  Diet: DIET GENERAL; Low Sodium (2 GM)  Dietary Nutrition Supplements: Clear Liquid Oral Supplement  11. Dietary Nutrition Supplements: Frozen Oral Supplement  12. Advance Directive: Full Code   13. Nutrition status:Supplemental Vitamins ordered. Dietitian assessment  14. Vaccinations: Immunization records reviewed. If has not received appropriate vaccinations, will order to be given prior to discharge. 15. DVT prophylaxis: On xarelto  16. Discharge planning: MARCELLUS on board.  Discharge planned for tomorrow  17. High Risk Readmission Screening Tool Score Noted. Additionally, the following hospital problems were addressed:  Principal Problem:    Impaired mobility and activities of daily living due to CHF exac, [facility] re-admit 2/19/2021  Active Problems:    Paroxysmal atrial fibrillation (HCC)    Lumbar stenosis with neurogenic claudication    Osteoporosis    Charcot Sabiha Slough Tooth muscular atrophy    Myelopathy ([address]    Dizziness    Essential hypertension    Pleural effusion    Impaired mobility    Acute cystitis with hematuria  Resolved Problems:    * No resolved hospital problems. *      ** Total time spent reviewing medical records, evaluating patient, speaking with RN's and consultants where I was focused exclusively on this patient: 35 minutes. This time is excluding time spent performing procedures or significant events occurring earlier or later in the day requiring my attention and focus. Subjective:   Admit Date: 2/19/2021  PCP: China Duncan MD    No acute events overnight. Afebrile Telemetry reviewed. No new complaints. Pt denies chest pain, SOB, N/V, fevers or chills. Objective:     Vitals:    02/28/21 1803 03/01/21 0246 03/01/21 0632 03/01/21 0941   BP: 127/83  133/73    Pulse: 83  71    Resp: 18  15    Temp: 99 °F (37.2 °C)  98 °F (36.7 °C)    TempSrc: Oral  Oral    SpO2: 91%  96% 93%   Weight:  161 lb (73 kg)     Height:         General appearance: No acute distress, A+ O x 1-2. No  conversational dyspnea noted. Dentition intact. Answers questions appropriately  Lungs:  Diminished,  no exp wheezes, No rales No retractions; No use of accessory muscles  Heart:  S1, S2 normal, RRR, no MRG appreciated  Abdomen: (+) BS, soft, non-tender; non distended no guarding or rigidity. Extremities:  no cyanosis, trace edema bilat lower exts, no calf tenderness bilaterally.  Dry skin noted       Medications:      nystatin, stomahesive in petrolatum   Topical 3 times per day    clotrimazole Topical BID    hydrocortisone-aloe   Topical BID    rivaroxaban  15 mg Oral Daily    tetrahydrozoline  1 drop Both Eyes TID    polyethylene glycol  17 g Oral Daily    ferrous sulfate  325 mg Oral BID WC    sulfamethoxazole-trimethoprim  1 tablet Oral BID WC    lactobacillus acidophilus  2 tablet Oral TID    aspirin  81 mg Oral Daily    budesonide-formoterol  2 puff Inhalation BID    carvedilol  3.125 mg Oral BID    citalopram  20 mg Oral Daily    digoxin  125 mcg Oral Daily    furosemide  20 mg Oral Daily    levothyroxine  88 mcg Oral Daily    pantoprazole  40 mg Oral BID AC    potassium chloride  20 mEq Oral Daily with breakfast    vitamin B-12  1,000 mcg Oral Daily       LABS Reviewed    IMAGING Reviewed    Divya Alves CNP  Rounding Hospitalist

## 2021-03-01 NOTE — PROGRESS NOTES
Occupational Therapy  Facility/Department: [facility]  Daily Treatment Note  NAME: Santo Donald  : 1932  MRN: [id number]    Date of Service: 3/1/2021    Discharge Recommendations:  Continue to assess pending progress       Assessment      Activity Tolerance  Activity Tolerance: Patient Tolerated treatment well  Activity Tolerance: 3L O2  Safety Devices  Safety Devices in place: Yes  Type of devices: All fall risk precautions in place         Patient Diagnosis(es): There were no encounter diagnoses. has a past medical history of Ascending aortic aneurysm ([address] Charcot Arleen Tooth muscular atrophy, Depression, Descending aortic aneurysm ([address] Essential hypertension, Headache, HTN (hypertension), Impaired mobility and activities of daily living, Lumbar stenosis with neurogenic claudication, Myelopathy ([address] and Osteoarthritis. has a past surgical history that includes joint replacement (Bilateral); thoracentesis (Left, 2021); and Pleural Cath Insertion (Left, 2021). Restrictions  Restrictions/Precautions  Restrictions/Precautions: Fall Risk     Subjective   General  Chart Reviewed: Yes  Patient assessed for rehabilitation services?: Yes  Family / Caregiver Present: No  Diagnosis: Impaired mobility and ADLs d/t CHF exacerbation    Subjective  Subjective: Rehan Polanco you girl. \"    Pain Assessment  Pain Level: 0  Pain Type: Acute pain  Pain Location: Vagina(catheter pain)  Pain Descriptors: Patient unable to describe  Pre Treatment Pain Screening  Pain at present: 0  Intervention List: Patient able to continue with treatment  Comments / Details: Kelly Pinto and Byron Mario RN notified     Orientation  Orientation  Overall Orientation Status: Within Normal Limits     Objective      Patient educated in AE for lower body dressing. Patient able to doff B socks with dressing stick with MIN difficulty requiring demonstration and MOD verbal cues.    Patient able to  B socks from floor with reacher

## 2021-03-01 NOTE — PROGRESS NOTES
Patient's IV and Zeng catheter removed this shift as ordered. Voiding trial started for patient. Patient's chest tube also drained as ordered with 290cc serosanguinous fluid out. Patient tolerated procedure well.  Electronically signed by Fish Calvillo RN on 3/1/2021 at 11:18 AM

## 2021-03-02 VITALS
RESPIRATION RATE: 17 BRPM | SYSTOLIC BLOOD PRESSURE: 92 MMHG | OXYGEN SATURATION: 93 % | BODY MASS INDEX: 28.53 KG/M2 | HEART RATE: 97 BPM | HEIGHT: 63 IN | DIASTOLIC BLOOD PRESSURE: 58 MMHG | TEMPERATURE: 97 F | WEIGHT: 161 LBS

## 2021-03-02 LAB
ANION GAP SERPL CALCULATED.3IONS-SCNC: 8 MEQ/L (ref 9–15)
BUN BLDV-MCNC: 14 MG/DL (ref 8–23)
CALCIUM SERPL-MCNC: 8.8 MG/DL (ref 8.5–9.9)
CHLORIDE BLD-SCNC: 102 MEQ/L (ref 95–107)
CO2: 25 MEQ/L (ref 20–31)
CREAT SERPL-MCNC: 0.68 MG/DL (ref 0.5–0.9)
GFR AFRICAN AMERICAN: >60
GFR NON-AFRICAN AMERICAN: >60
GLUCOSE BLD-MCNC: 108 MG/DL (ref 70–99)
HCT VFR BLD CALC: 31.2 % (ref 37–47)
HEMOGLOBIN: 10.3 G/DL (ref 12–16)
MCH RBC QN AUTO: 29.3 PG (ref 27–31.3)
MCHC RBC AUTO-ENTMCNC: 33.2 % (ref 33–37)
MCV RBC AUTO: 88.3 FL (ref 82–100)
PDW BLD-RTO: 14.9 % (ref 11.5–14.5)
PLATELET # BLD: 258 K/UL (ref 130–400)
POTASSIUM SERPL-SCNC: 4.2 MEQ/L (ref 3.4–4.9)
RBC # BLD: 3.53 M/UL (ref 4.2–5.4)
SODIUM BLD-SCNC: 135 MEQ/L (ref 135–144)
WBC # BLD: 9.2 K/UL (ref 4.8–10.8)

## 2021-03-02 PROCEDURE — 2700000000 HC OXYGEN THERAPY PER DAY

## 2021-03-02 PROCEDURE — 85027 COMPLETE CBC AUTOMATED: CPT

## 2021-03-02 PROCEDURE — 94761 N-INVAS EAR/PLS OXIMETRY MLT: CPT

## 2021-03-02 PROCEDURE — 99238 HOSP IP/OBS DSCHRG MGMT 30/<: CPT | Performed by: PHYSICAL MEDICINE & REHABILITATION

## 2021-03-02 PROCEDURE — 2500000003 HC RX 250 WO HCPCS: Performed by: PHYSICAL MEDICINE & REHABILITATION

## 2021-03-02 PROCEDURE — 6370000000 HC RX 637 (ALT 250 FOR IP): Performed by: PHYSICAL MEDICINE & REHABILITATION

## 2021-03-02 PROCEDURE — 80048 BASIC METABOLIC PNL TOTAL CA: CPT

## 2021-03-02 PROCEDURE — 6370000000 HC RX 637 (ALT 250 FOR IP): Performed by: INTERNAL MEDICINE

## 2021-03-02 PROCEDURE — 36415 COLL VENOUS BLD VENIPUNCTURE: CPT

## 2021-03-02 RX ADMIN — Medication: at 00:18

## 2021-03-02 RX ADMIN — LACTOBACILLUS TAB 2 TABLET: TAB at 00:17

## 2021-03-02 RX ADMIN — Medication: at 06:56

## 2021-03-02 RX ADMIN — LACTOBACILLUS TAB 2 TABLET: TAB at 13:08

## 2021-03-02 RX ADMIN — ASPIRIN 81 MG: 81 TABLET, CHEWABLE ORAL at 10:50

## 2021-03-02 RX ADMIN — HYDROCORTISONE: 0.01 CREAM TOPICAL at 10:57

## 2021-03-02 RX ADMIN — POTASSIUM CHLORIDE 20 MEQ: 20 TABLET, EXTENDED RELEASE ORAL at 10:52

## 2021-03-02 RX ADMIN — CYANOCOBALAMIN TAB 500 MCG 1000 MCG: 500 TAB at 10:50

## 2021-03-02 RX ADMIN — FUROSEMIDE 20 MG: 20 TABLET ORAL at 10:52

## 2021-03-02 RX ADMIN — CLOTRIMAZOLE: 10 CREAM TOPICAL at 00:21

## 2021-03-02 RX ADMIN — LACTOBACILLUS TAB 2 TABLET: TAB at 10:51

## 2021-03-02 RX ADMIN — LEVOTHYROXINE SODIUM 88 MCG: 88 TABLET ORAL at 06:55

## 2021-03-02 RX ADMIN — DIGOXIN 125 MCG: 125 TABLET ORAL at 10:52

## 2021-03-02 RX ADMIN — PANTOPRAZOLE SODIUM 40 MG: 40 TABLET, DELAYED RELEASE ORAL at 06:55

## 2021-03-02 RX ADMIN — HYDROCORTISONE: 0.01 CREAM TOPICAL at 00:19

## 2021-03-02 RX ADMIN — CARVEDILOL 3.12 MG: 3.12 TABLET, FILM COATED ORAL at 00:17

## 2021-03-02 RX ADMIN — CARVEDILOL 3.12 MG: 3.12 TABLET, FILM COATED ORAL at 10:51

## 2021-03-02 RX ADMIN — FERROUS SULFATE TAB 325 MG (65 MG ELEMENTAL FE) 325 MG: 325 (65 FE) TAB at 15:17

## 2021-03-02 RX ADMIN — CITALOPRAM HYDROBROMIDE 20 MG: 20 TABLET ORAL at 10:51

## 2021-03-02 RX ADMIN — TETRAHYDROZOLINE HCL 1 DROP: 0.05 SOLUTION/ DROPS OPHTHALMIC at 00:22

## 2021-03-02 RX ADMIN — PANTOPRAZOLE SODIUM 40 MG: 40 TABLET, DELAYED RELEASE ORAL at 15:17

## 2021-03-02 RX ADMIN — POLYETHYLENE GLYCOL 3350 17 G: 17 POWDER, FOR SOLUTION ORAL at 10:50

## 2021-03-02 RX ADMIN — Medication: at 15:04

## 2021-03-02 RX ADMIN — FERROUS SULFATE TAB 325 MG (65 MG ELEMENTAL FE) 325 MG: 325 (65 FE) TAB at 10:51

## 2021-03-02 NOTE — PROGRESS NOTES
Patient discharged at 17:00 to home with [facility]. Patient and son given discharge instructions and educated on importance of patient's oxygen use at home. Patient's son stated understanding, but doubtful that patient will use oxygen at home. Patient non-compliant with oxygen use today. Reinforced education about oxygen use during exertion and bedtime related to patient ambulating and becoming short of breath. Patient discharged with oxygen equipment and with no signs of distress at time of discharge.  Electronically signed by Jessi Ruano RN on 3/2/2021 at 5:09 PM

## 2021-03-02 NOTE — PROGRESS NOTES
activity  Ambulation 2  Surface - 2: carpet  Device 2: Rolling Walker  Assistance 2: Stand by assistance, Supervision  Quality of Gait 2: slow and steady, increased fatigue. decreased safety with approach to chair  Gait Deviations: Slow Lea, Increased JAMES  Distance: 120 feet  Stairs/Curb  Stairs?: Yes  Stairs  # Steps : 4  Stairs Height: 6\"  Rails: Bilateral  Device: No Device  Assistance: Stand by assistance, Contact guard assistance  Comment: Non reciprocal pattern Increased SOB SpO2 96% post stair negotiation      Outcomes Measures:  Bernard Balance Score: 28       Pt/ family education/training: Pt has been educated throughout her stay. She is unable to consistently carry over safety instructions and concern for her safety remains present. Pt son was present for training and observed her abilities. He states he feels comfortable with her d/c at this level    Assessment:  Pt safety concerns persist due to memory deficits. She has made gains in all areas however was not able to meet goals for indep in transfers and gait. LTG established:  Long term goal 1: met 3/1  Long term goal 2: not met  Long term goal 3: pt is supervision to sba. Long term goal 4: pt is sba to cga  Long term goal 5: 3/1 Bernard 28/56    Discharge Plan: d/c to home with follow up PT recommended.  ww is recommended for safest gait with supervision        Electronically signed by Sayda Mejias PT on 3/2/2021 at 3:44 PM

## 2021-03-02 NOTE — DISCHARGE SUMMARY
Subjective: The patient complains of  moderate to severe acute    CHF partially relieved by rest, PT, OT,   and exacerbated by recent illness and exertion. I am concerned about patients  frailty and bleeding risk on Xarelto. He had been complaining of Zeng associated pain which resolved after Zeng was out. I also checked a UA and she is so far negative for infection. We are planning for her discharge today. According to recent nursing note, \"Patient's IV and Zeng catheter removed this shift as ordered. Voiding trial started for patient. Patient's chest tube also drained as ordered with 290cc serosanguinous fluid out. Patient tolerated procedure well. \"     [address] Course: The patient was admitted to the Rehabilitation Unit to address ADL and mobility deficits. The patient was enrolled in acute PT, OT program.  Weekly team meetings were held to assess functional progress toward their goals. The patient's medical issues were addressed. The patient progressed in the rehab program and is now ready for discharge. Refer to FIM scores summary report for detailed functional status. Greater than 25 minutes was spent on coordinating patients discharge including follow-up care, medications and patient/family education. Extended time needed because of the potential use of opiate medications are high risk medications and a high risk population individual.  Patient and family were instructed to use lowest effective dose of these medications and slowly titrate off over the next 2 to 4 weeks. They are not to combine opiates with sedatives. I reviewed her [facility] prescription monitoring service data sheets in hopes of eliminating polypharmacy and weaning to the lowest effective dose of pain medications and eliminating the concomitant use of benzodiazepines. I see no medications of concern. I see no habits of combining sedatives and narcotics. ROS x10:   The patient also complains of severely impaired mobility and activities of daily living. Otherwise no new problems with vision, hearing, nose, mouth, throat, dermal, cardiovascular, GI, , pulmonary, musculoskeletal, psychiatric or neurological. See Rehab H&P on Rehab chart dated . Vital signs:  BP (!) 155/88   Pulse 91   Temp 98 °F (36.7 °C) (Oral)   Resp 15   Ht 5' 3\" (1.6 m)   Wt 161 lb (73 kg)   SpO2 97%   BMI 28.52 kg/m²   I/O:   PO/Intake:  fair PO intake, no problems observed or reported. Bowel/Bladder:   UTI Bactrim, constipation  ,taylor for retention, constipation  General:  Patient is well developed, adequately nourished, non-obese and     well kempt. HEENT:    PERRLA, hearing intact to loud voice, external inspection of ear     and nose benign. Inspection of lips, tongue and gums benign  Musculoskeletal: No significant change in strength or tone. All joints stable. Inspection and palpation of digits and nails show no clubbing,       cyanosis or inflammatory conditions. Neuro/Psychiatric: Affect: flat. Alert and oriented to person, place and     situation. No significant change in deep tendon reflexes or     sensation  Lungs:  Diminished, CTA-B. Respiration effort is normal at rest.     Heart:   S1 = S2, RRR. No loud murmurs. Abdomen:  Mildly distended with gas, soft, non-tender, no enlargement of liver or spleen. Extremities:  No significant lower extremity edema or tenderness.   Skin:   Intact to general survey, healing left Pleurex catheter    Rehabilitation:  Physical therapy: FIMS:  Bed Mobility: Scooting: Supervision    Transfers: Sit to Stand: Supervision, Stand by assistance  Stand to sit: Supervision, Stand by assistance  Bed to Chair: Stand by assistance, Ambulation 1  Surface: carpet  Device: Rolling Walker  Other Apparatus: O2  Assistance: Stand by assistance  Quality of Gait: steppage gt, difficulty with straight path, increased sob  Gait Deviations: Slow Lea, Increased JAMES  Distance: 120 feet  Comments: sob with all activity, Stairs  # Steps : 4  Stairs Height: 6\"  Rails: Bilateral  Device: No Device  Assistance: Stand by assistance, Contact guard assistance  Comment: Non reciprocal pattern Increased SOB SpO2 96% post stair negotiation    FIMS:  ,  , Assessment: Pt able to maintain O2 sats WNLs however continues to display SOB. Mild impulsive throughout session, decreased O2 line management and awareness observed. Significantly challenged with NBOS activities requiring UE assist.    Occupational therapy: FIMS:   ,  , Assessment: Pt. continues to demonstrate decreased  and decreased UE endurance. Pt. requires increased time and demonstrates continued need for rest breaks for B hands. Pt. continues to benefit from OT to maximize independence with ADL tasks.     Speech therapy: FIMS:        Lab/X-ray studies reviewed, analyzed and discussed with patient and staff:   Recent Results (from the past 24 hour(s))   Urinalysis Reflex to Culture    Collection Time: 03/01/21  1:45 PM    Specimen: Urine voided   Result Value Ref Range    Color, UA ORANGE (A) Straw/Yellow    Clarity, UA Clear Clear    Glucose, Ur Negative Negative mg/dL    Bilirubin Urine Negative Negative    Ketones, Urine Negative Negative mg/dL    Specific Gravity, UA 1.010 1.005 - 1.030    Blood, Urine LARGE (A) Negative    pH, UA 5.0 5.0 - 9.0    Protein, UA 30 (A) Negative mg/dL    Urobilinogen, Urine 0.2 <2.0 E.U./dL    Nitrite, Urine Negative Negative    Leukocyte Esterase, Urine TRACE (A) Negative    Urine Reflex to Culture Not Indicated    Microscopic Urinalysis    Collection Time: 03/01/21  1:45 PM   Result Value Ref Range    Bacteria, UA Negative Negative /HPF    Hyaline Casts, UA 0-1 0 - 5 /HPF    WBC, UA 6-9 (A) 0 - 5 /HPF    RBC, UA >100 (H) 0 - 5 /HPF    Epithelial Cells, UA 0-2 0 - 5 /HPF   Basic Metabolic Panel    Collection Time: 03/02/21  5:23 AM   Result Value Ref Range    Sodium 135 135 - 144 mEq/L Potassium 4.2 3.4 - 4.9 mEq/L    Chloride 102 95 - 107 mEq/L    CO2 25 20 - 31 mEq/L    Anion Gap 8 (L) 9 - 15 mEq/L    Glucose 108 (H) 70 - 99 mg/dL    BUN 14 8 - 23 mg/dL    CREATININE 0.68 0.50 - 0.90 mg/dL    GFR Non-African American >60.0 >60    GFR  >60.0 >60    Calcium 8.8 8.5 - 9.9 mg/dL   CBC    Collection Time: 03/02/21  5:23 AM   Result Value Ref Range    WBC 9.2 4.8 - 10.8 K/uL    RBC 3.53 (L) 4.20 - 5.40 M/uL    Hemoglobin 10.3 (L) 12.0 - 16.0 g/dL    Hematocrit 31.2 (L) 37.0 - 47.0 %    MCV 88.3 82.0 - 100.0 fL    MCH 29.3 27.0 - 31.3 pg    MCHC 33.2 33.0 - 37.0 %    RDW 14.9 (H) 11.5 - 14.5 %    Platelets 432 866 - 837 K/uL       Previous extensive, complex labs, notes and diagnostics reviewed and analyzed. ALLERGIES:    Allergies as of 02/19/2021 - Review Complete 02/19/2021   Allergen Reaction Noted    Latex  07/19/2017    Tape [adhesive tape]  06/28/2016      (please also verify by checking MAR)      Yesterday I evaluated this patient for periodic reassessment of medical and functional status. The patient was discussed in detail at the treatment team meeting focusing on current medical issues, progress in therapies, social issues, psychological issues, barriers to progress and strategies to address these barriers, and discharge planning. See the hand written addendum to rehab progress note. The patient continues to be high risk for future disability and their medical and rehabilitation prognosis continue to be good and therefore, we will continue the patient's rehabilitation course as planned. The patient's tentative discharge date was set. Patient and family education was discussed. The patient was made aware of the team discussion regarding their progress. Discharge plans were discussed along with barriers to progress and strategies to address these barriers, patient encouraged to continue to discuss discharge plans with .        Complex Physical Medicine & Rehab Issues Assess & Plan:   1. Severe abnormality of gait and mobility and impaired self-care and ADL's secondary to progressive weakness dt   CHF . Functional and medical status improved and stabilized status post acute rehab at [facility].  2. Bowel progressive constipation, and Bladder dysfunction monitoring neurogenic bladder:  frequent toileting, ambulate to bathroom with assistance, check post void residuals. Check for C.difficile x1 if >2 loose stools in 24 hours, continue bowel & bladder program.  Monitor bowel and bladder function. Lactinex 2 PO every AC. MOM prn, Brown Bomb prn, Glycerin suppository prn, enema prn. Add lactobacillus and lactulose. 3. Severe lbp pain as well as generalized OA pain: reassess pain every shift and prior to and after each therapy session, give prn medications, modalities prn in therapy, Lidoderm, K-pad prn.   4. Skin healing left Pleurx catheter incision and breakdown risk:  continue pressure relief program.  Daily skin exams and reports from nursing. 5. Severe fatigue due to nutritional and hydration deficiency: Add vitamin B12 vitamin D and CoQ10 continue to monitor I&Os, calorie counts prn, dietary consult prn. Add healthy HS snack. 6. Acute episodic insomnia with situational adjustment disorder:  prn Ambien, monitor for day time sedation. Add HS \"Tuck In\"  7. Falls risk elevated:  patient to use call light to get nursing assistance to get up, bed and chair alarm. 8. Elevated DVT risk: progressive activities in PT, continue prophylaxis PORTILLO hose, elevation and Xarelto will be on hold pending thoracic surgery. 9. Complex discharge planning: Discharge home alone 3/2/21 with help from her son and daughter as well as home health care. Progress toward her final weekly team meeting  Monday to assess progress towards goals, discuss and address social, psychological and medical comorbidities and to address difficulties they may be having progressing in therapy. Patient and family education is in progress. The patient is to follow-up with their family physician after discharge. Complex Active General Medical Issues that complicated care Assess & Plan:     1. Principal Problem:    Impaired mobility and activities of daily living due to CHF exac, [id number]  Active Problems:  1. Paroxysmal atrial fibrillation,  Essential hypertension with low blood pressures of late, recurrent pleural effusions acute on chronic combined systolic and diastolic CHF (congestive heart failure)-continue blood signs every shift focusing on heart rate and blood pressure checks, consult hospitalist for backup medical and adjust/add medications (aspirin, Xarelto, Coreg, Cardizem, Lasix, Nitrostat) plan as above consult cardiology titrate IV Lasix recheck chest x-ray wean high flow oxygen patient  requires a VATS procedure-scheduled for 2/25/2021. .  Midodrine titration add abdominal binders and teds  2. Acute severe UTI-Bactrim and monitor for residual urine in the bladder dose Bactrim with food add IM Rocephin check urine culture check postvoid residuals  3. Lumbar stenosis with neurogenic claudication  4. Charcot Arleen Tooth muscular atrophy, Osteoarthritis of lumbar spine with myelopathy,  Osteoarthritis  5. Severe depression and anxiety -emotional support provided daily, vitamin B12, encourage participation in rehabilitation support group and recreational therapy, adjust/add medications (Celexa Ativan)  6. Hypothyroidism-titrate Synthroid  7. Recurrent pleural effusion patient is SP - left Pleurx catheter insertion PA of 2/24/2021 with Dr. Rekha Newton. Family and patient will need instruction on how to use it as an outpatient.     Ayala Olmstead D.O., PM&R     Attending    [address]

## 2021-03-02 NOTE — PROGRESS NOTES
Home O2 eval. Resting room air spo2=88%. Resting 2L spo2= 95%. Patient qualifies for home oxygen at 2L.  RW RRT

## 2021-03-03 NOTE — PROGRESS NOTES
[facility] DISCHARGE SUMMARY- REHAB     Date: 3/3/2021  Patient Name: Concetta Louie        MRN: [id number]  Account: [de-identified]   : 1932  (80 y.o.)  Room: [place] [address]    Diagnosis:  Impaired mobility and ADLs d/t CHF exacerbation    Past Medical History:   Diagnosis Date    Ascending aortic aneurysm ([facility] 2017    Charcot Arleen Tooth muscular atrophy     Depression     Descending aortic aneurysm ([facility] [address] [facility] 2017    Essential hypertension 2018    Headache     HTN (hypertension)     Impaired mobility and activities of daily living     Lumbar stenosis with neurogenic claudication     Myelopathy ([facility] [address] [facility]     Osteoarthritis      Past Surgical History:   Procedure Laterality Date    JOINT REPLACEMENT Bilateral     knees    PLEURAL CATH INSERTION Left 2021    LEFT PLEURAL CATHETER INSERTION performed by Crystal Casey MD at [address] [place] [address] Left 2021    total of 755 cc removed per Dr Castellanos Do specimen sent to lab       Precautions:   Restrictions/Precautions: Fall Risk     Social/Functional History:  Social/Functional History  Lives With: Alone  Type of Home: House  Home Layout: One level  Home Access: Stairs to enter with rails  Entrance Stairs - Rails: Both  Bathroom Shower/Tub: Tub/Shower unit  Bathroom Equipment: Grab bars in shower, Shower chair  Home Equipment: Rolling walker, Cane  ADL Assistance: Independent  Homemaking Assistance: Independent  Homemaking Responsibilities: Yes  Ambulation Assistance: Independent  Transfer Assistance: Independent  Active : No  Additional Comments: Son stops over 2 times daily    Current Functional Status:  ADL  Feeding: Modified independent   Grooming: Modified independent   UE Bathing: Modified independent   LE Bathing: Supervision  UE Dressing: Modified independent   LE Dressing: Modified independent   Toileting: Modified independent   Additional Comments: pt completed shower ADL at the above Movement Other  Comment: poor pincer abilibies for fine motor coordination    D/C Recommendations:    Equipment Recommendations:  OT D/C Equipment  Equipment Needed: Yes  Equipment Recommended: (hip kit)    OT Follow Up:  OT D/C RECOMMENDATIONS  REQUIRES OT FOLLOW UP: Yes  Type: Home OT    Home Exercise Program Provided: [x] Yes [] No  If yes, type of HEP: UE strengthening     Electronically signed by:    SERAFIN Chicas  3/3/2021, 7:37 AM

## 2021-03-25 ENCOUNTER — OFFICE VISIT (OUTPATIENT)
Dept: CARDIOLOGY CLINIC | Age: 86
End: 2021-03-25
Payer: MEDICARE

## 2021-03-25 ENCOUNTER — HOSPITAL ENCOUNTER (OUTPATIENT)
Dept: GENERAL RADIOLOGY | Age: 86
Discharge: HOME | End: 2021-03-27
Payer: MEDICARE

## 2021-03-25 VITALS
OXYGEN SATURATION: 95 % | SYSTOLIC BLOOD PRESSURE: 118 MMHG | HEART RATE: 71 BPM | RESPIRATION RATE: 16 BRPM | DIASTOLIC BLOOD PRESSURE: 68 MMHG

## 2021-03-25 DIAGNOSIS — J90 PLEURAL EFFUSION: Primary | ICD-10-CM

## 2021-03-25 DIAGNOSIS — I50.43 ACUTE ON CHRONIC COMBINED SYSTOLIC AND DIASTOLIC CHF (CONGESTIVE HEART FAILURE) (HCC): Primary | ICD-10-CM

## 2021-03-25 DIAGNOSIS — J90 PLEURAL EFFUSION: ICD-10-CM

## 2021-03-25 DIAGNOSIS — I10 ESSENTIAL HYPERTENSION: ICD-10-CM

## 2021-03-25 DIAGNOSIS — I50.43 ACUTE ON CHRONIC COMBINED SYSTOLIC (CONGESTIVE) AND DIASTOLIC (CONGESTIVE) HEART FAILURE (HCC): ICD-10-CM

## 2021-03-25 DIAGNOSIS — I71.21 ASCENDING AORTIC ANEURYSM: ICD-10-CM

## 2021-03-25 DIAGNOSIS — I48.0 PAROXYSMAL ATRIAL FIBRILLATION (HCC): ICD-10-CM

## 2021-03-25 DIAGNOSIS — I71.9 DESCENDING AORTIC ANEURYSM (HCC): ICD-10-CM

## 2021-03-25 PROCEDURE — 71045 X-RAY EXAM CHEST 1 VIEW: CPT

## 2021-03-25 PROCEDURE — 1036F TOBACCO NON-USER: CPT | Performed by: INTERNAL MEDICINE

## 2021-03-25 PROCEDURE — 4040F PNEUMOC VAC/ADMIN/RCVD: CPT | Performed by: INTERNAL MEDICINE

## 2021-03-25 PROCEDURE — G8427 DOCREV CUR MEDS BY ELIG CLIN: HCPCS | Performed by: INTERNAL MEDICINE

## 2021-03-25 PROCEDURE — 1090F PRES/ABSN URINE INCON ASSESS: CPT | Performed by: INTERNAL MEDICINE

## 2021-03-25 PROCEDURE — 1123F ACP DISCUSS/DSCN MKR DOCD: CPT | Performed by: INTERNAL MEDICINE

## 2021-03-25 PROCEDURE — 99214 OFFICE O/P EST MOD 30 MIN: CPT | Performed by: INTERNAL MEDICINE

## 2021-03-25 PROCEDURE — 1111F DSCHRG MED/CURRENT MED MERGE: CPT | Performed by: INTERNAL MEDICINE

## 2021-03-25 PROCEDURE — G8484 FLU IMMUNIZE NO ADMIN: HCPCS | Performed by: INTERNAL MEDICINE

## 2021-03-25 PROCEDURE — G8417 CALC BMI ABV UP PARAM F/U: HCPCS | Performed by: INTERNAL MEDICINE

## 2021-03-25 NOTE — PROGRESS NOTES
Chief Complaint   Patient presents with   [address] from [facility] [place] D/C 2/2/2021    Congestive Heart Failure       5-12-16: Patient presents for initial medical evaluation. Patient is followed on a regular basis by Dr. Kristel Durham MD. S/p hospitalization for hip pain. Was seen by us for bradycardia and RBBB. She was asymptomatic at that time. Her cardizem was lowered to 240mg daily. HR is in 70's today. Pt denies chest pain, dyspnea, dyspnea on exertion, change in exercise capacity, fatigue,  nausea, vomiting, diarrhea, constipation, motor weakness, insomnia, weight loss, syncope, dizziness, lightheadedness, palpitations, PND, orthopnea, or claudication                   no hx of MI, CHF or arrhythmia. No hx of LHC. No recent stress test. Does have back and right hip pain. 6-9-16: needs to have back surgery with DR. Sue Cadet. S/p ECHO with EF of 60%, mild LVH, grade I DD, mild TR, + IAS aneurysm with ? Left to right PFO, aorta aneurysm noted meauring  4.2-4.5cm. Pt denies chest pain, dyspnea, dyspnea on exertion, change in exercise capacity, fatigue,  nausea, vomiting, diarrhea, constipation, motor weakness, insomnia, weight loss, syncope, dizziness, lightheadedness, palpitations, PND, orthopnea. BP and HR are good. 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 No dissection. Descending aorta measures 3.6cm. Pt denies chest pain, dyspnea, dyspnea on exertion, change in exercise capacity, fatigue,  nausea, vomiting, diarrhea, constipation, motor weakness, insomnia, weight loss, syncope, dizziness, lightheadedness, palpitations, PND, orthopnea, or claudication. Back surgery is tomorrow. 9-27-16: as above, doing well overall.  Pt denies chest pain, dyspnea, dyspnea on exertion, change in exercise capacity, fatigue,  nausea, vomiting, diarrhea, constipation, motor weakness, insomnia, weight loss, syncope, dizziness, lightheadedness, palpitations, PND, orthopnea, or effusion.   No evidence of pleural effusion. 2-16-18: Pt denies chest pain, dyspnea, dyspnea on exertion, change in exercise capacity, fatigue,  nausea, vomiting, diarrhea, constipation, motor weakness, insomnia, weight loss, syncope, dizziness, lightheadedness, palpitations, PND, orthopnea, or claudication. No nitro use. BP and hr are good. CAD is stable. No LE discoloration or ulcers. No LE edema. No CHF type symptoms. Lipid profile is normal.    states her Bp is labile. No further SVT episodes. On Eliquis and no bleeding issues. Did have follow up with Pulm and refused HUNTER testing. Airway resistance and airway conductance were both normal.     OVERALL IMPRESSION:  This study shows no significant obstructive  pattern but there was improvement in airflow after bronchodilator  therapy suggesting mild reactive airway disease. There was no  restrictive or diffusion impairment noted on this study. 8-24-18: having labile BP readings a times. Pt denies chest pain, dyspnea, dyspnea on exertion, change in exercise capacity, fatigue,  nausea, vomiting, diarrhea, constipation, motor weakness, insomnia, weight loss, syncope, dizziness, lightheadedness, palpitations, PND, orthopnea, or claudication. No nitro use. BP and hr are good. CAD is stable. No LE discoloration or ulcers. No LE edema. No CHF type symptoms. Lipid profile is normal. No recent hospitalization. No change in meds. Known MR of 2+. On NOAC, no bleeding issues. 2-29-19: on eliquis. Was having nose bleeds and was referred to ENT. Back on Eliquis now. On ASA. Pt denies chest pain, dyspnea, dyspnea on exertion, change in exercise capacity, fatigue,  nausea, vomiting, diarrhea, constipation, motor weakness, insomnia, weight loss, syncope, dizziness, lightheadedness, palpitations, PND, orthopnea, or claudication. No nitro use. BP and hr are good. CAD is stable. No LE discoloration or ulcers. No LE edema. No CHF type symptoms.  Lipid profile is normal. No recent hospitalization. No change in meds. EKG with NSR. Was placed on [facility] after developing DVT post ortho surgery. No documented hx of Afibb. 10/2/2020: Patient is doing well overall. She feels good. She is only left the house twice in 6 months due to coronavirus pandemic. Patient with history of a sending aorta aneurysm measuring 4.3 cm. She has a known history of mitral regurgitation 2+. She was taken off of oral anticoagulation after DVT post Ortho surgery. She does not have any documented history of atrial fibrillation. Blood pressure is 116/70 heart rate of 67 bpm.  EKG with normal sinus rhythm, right bundle branch block morphology. Patient with history of cardiac catheterization that showed normal coronary arteries. Positive history of SVT ablation. Status post CT of the chest on March 15, 2019 that showed no change of her a sending aorta aneurysm measuring  4.3 cm by 4.3 cm at the level of the bifurcation of the pulmonary arteries, descending thoracic aorta measures 4.2 x 4.9 cm at the level of the right inferior pulmonary vein. 2/22/2020: Patient is a 80 y. o. female who presents with a chief complaint of SOB.  Patient is followed on a regular basis by [facility], [place]. Patient with past medical history of SVT status post ablation, descending thoracic aorta aneurysm measuring 4.9 cm, ascending aorta aneurysm measuring 4.3 cm, paroxysmal atrial fibrillation,History of provoked DVT, originally presented to [facility] significant shortness of breath as well as rapid heartbeat and chest pain.   Patient was noted to be in acute decompensated combined heart failure.  She was started on IV diuretics.  She was also developed paroxysmal atrial fibrillation started on oral anticoagulation.  She underwent cardiac catheterization for elevated troponins as well as chest pain and a presentation of acute decompensated heart failure and ejection fraction of 40% was noted to have moderate mid LAD stenosis with negative IFR.  Patient underwent left pleural effusion thoracentesis x2 by interventional radiology. Safia Tubbs was seen by pulmonary as well.  She was transferred to rehab previously and developed worsening shortness of breath as well as O2 desaturation and transferred to [facility]. Patient was noted to have urinary retention and a Zeng was placed and urology was consulted. Patient also continues to have left pleural effusion and thoracic surgery is consulted for possible Pleurx catheter. She is currently seen in rehab and doing okay. She denies any chest discomfort. She has low blood pressure and some blood pressure medications are on hold        3/25/2021: Status post hospitalization at [facility] for acute decompensated heart failure/bilateral pleural effusions. Noted to be in paroxysmal atrial fibrillation and started on oral anticoagulation. Patient had a long hospitalization as well as multiple thoracentesis. She required rehab stay. Patient was noted to have urinary retention at that time. She underwent cardiac catheterization with moderate mid LAD stenosis status post negative IFR, ejection fraction of 40%. Patient is on Xarelto. She is on Coreg as well as digoxin. Renal function is normal.  Hemoglobin is stable.         Patient Active Problem List   Diagnosis    Lumbar stenosis with neurogenic claudication    Acquired scoliosis    Osteoporosis    Charcot Arleen Tooth muscular atrophy    Excess weight    Osteoarthritis of lumbar spine with myelopathy    Osteoarthritis    Myelopathy ([address]    Impaired mobility and activities of daily living due to CHF exac, [facility] re-admit 2/19/2021    Headache    Depression    Ascending aortic aneurysm (HCC)    Descending aortic aneurysm (HCC)    Dizziness    Shortness of breath    Essential hypertension    Acute on chronic combined systolic and diastolic CHF (congestive heart failure) (HCC)    Gait abnormality    Paroxysmal atrial fibrillation (HCC)    Pleural effusion    Hypoxia    Acute pulmonary edema (HCC)    Acute on chronic combined systolic (congestive) and diastolic (congestive) heart failure (HCC)    Impaired mobility    Acute cystitis with hematuria       Past Surgical History:   Procedure Laterality Date    JOINT REPLACEMENT Bilateral     knees    PLEURAL CATH INSERTION Left 2/25/2021    LEFT PLEURAL CATHETER INSERTION performed by Ethan Rivero MD at [address] Left 01/29/2021    total of 755 cc removed per Dr Enrique Shore specimen sent to lab       Social History     Socioeconomic History    Marital status:      Spouse name: None    Number of children: None    Years of education: None    Highest education level: None   Occupational History    None   Social Needs    Financial resource strain: None    Food insecurity     Worry: None     Inability: None    Transportation needs     Medical: None     Non-medical: None   Tobacco Use    Smoking status: Never Smoker    Smokeless tobacco: Never Used   Substance and Sexual Activity    Alcohol use: No     Alcohol/week: 0.0 standard drinks    Drug use: No    Sexual activity: None   Lifestyle    Physical activity     Days per week: 0 days     Minutes per session: 0 min    Stress: Only a little   Relationships    Social connections     Talks on phone: More than three times a week     Gets together: More than three times a week     Attends [employer] service: 1 to 4 times per year     Active member of club or organization: No     Attends meetings of clubs or organizations: Never     Relationship status:      Intimate partner violence     Fear of current or ex partner: No     Emotionally abused: No     Physically abused: No     Forced sexual activity: No   Other Topics Concern    None   Social History Narrative    Lives With: Alone, son lives down the street, dtr is in the area    Has a son in the area    Type of Home: [address] [place] [address] Court: One level    Home Access: Stairs to enter with rails- Number of Steps: 2- Rails: Both    Bathroom Shower/Tub: Tub/Shower unit, Bathroom Equipment: Grab bars in shower, Shower chair    Home Equipment: Rolling walker, Cane(Pt infrequemtly uses DME for ambulation and prefers to furniture walk in home)    ADL Assistance: [id number], [address]: [address] [place] [address] Responsibilities: Yes    Ambulation Assistance: Independent, Transfer Assistance: Independent    Additional Comments: Son stops over 2 times daily           Family History   Problem Relation Age of Onset    Arthritis Mother     Arthritis Father     High Blood Pressure Father        Current Outpatient Medications   Medication Sig Dispense Refill    nitroGLYCERIN (NITROSTAT) 0.4 MG SL tablet up to max of 3 total doses.  If no relief after 1 dose, call 911. 25 tablet 3    budesonide-formoterol (SYMBICORT) 160-4.5 MCG/ACT AERO Inhale 2 puffs into the lungs 2 times daily 1 Inhaler 3    rivaroxaban (XARELTO) 15 MG TABS tablet Take 1 tablet by mouth daily 30 tablet 0    carvedilol (COREG) 3.125 MG tablet Take 1 tablet by mouth 2 times daily 60 tablet 3    digoxin (LANOXIN) 125 MCG tablet Take 1 tablet by mouth daily 30 tablet 3    furosemide (LASIX) 20 MG tablet Take 1 tablet by mouth daily 60 tablet 3    ferrous sulfate (IRON 325) 325 (65 Fe) MG tablet Take 1 tablet by mouth 2 times daily (with meals) 30 tablet 3    simethicone (MYLICON) 80 MG chewable tablet Take 1 tablet by mouth every 6 hours as needed for Flatulence 180 tablet 3    pantoprazole (PROTONIX) 40 MG tablet Take 1 tablet by mouth 2 times daily (before meals) 30 tablet 3    Carboxymethylcellulose Sodium (EYE DROPS OP) Apply 1 drop to eye as needed (Dry eyes) Indications: Systane       Boswellia-Glucosamine-Vit D (OSTEO BI-FLEX ONE PER DAY) TABS Take by mouth daily      Calcium Carbonate-Vitamin D (CALTRATE 600+D PO) Take by mouth daily      Multiple Vitamins-Minerals (CENTRUM SILVER ULTRA WOMENS) TABS Take by mouth daily      vitamin B-12 (CYANOCOBALAMIN) 1000 MCG tablet Take 1,000 mcg by mouth daily      KLOR-CON M20 20 MEQ extended release tablet TAKE 1 TABLET DAILY WITH BREAKFAST (MUST CALL OFFICE FOR FOLLOW UP) 90 tablet 3    aspirin 81 MG tablet Take 81 mg by mouth daily       citalopram (CELEXA) 20 MG tablet Take 20 mg by mouth daily       SYNTHROID 88 MCG tablet Take 88 mcg by mouth daily        No current facility-administered medications for this visit. Latex and Tape [adhesive tape]    Review of Systems:  General ROS: negative  Psychological ROS: negative  Hematological and Lymphatic ROS: No history of blood clots or bleeding disorder. Respiratory ROS: no cough, shortness of breath, or wheezing  Cardiovascular ROS: no chest pain or dyspnea on exertion  Gastrointestinal ROS: no abdominal pain, change in bowel habits, or black or bloody stools  Genito-Urinary ROS: no dysuria, trouble voiding, or hematuria  Musculoskeletal ROS: negative  Neurological ROS: no TIA or stroke symptoms  Dermatological ROS: negative    VITALS:  Blood pressure 118/68, pulse 71, resp. rate 16, SpO2 95 %. There is no height or weight on file to calculate BMI. Physical Examination:  General appearance - alert, well appearing, and in no distress and overweight  Mental status - alert, oriented to person, place, and time  Neck - Neck is supple, no JVD or carotid bruits. No thyromegaly or adenopathy.    Chest - clear to auscultation, no wheezes, rales or rhonchi, symmetric air entry  Heart - normal rate, regular rhythm, normal S1, S2, no murmurs, rubs, clicks or gallops  Abdomen - soft, nontender, nondistended, no masses or organomegaly  Neurological - alert, oriented, normal speech, no focal findings or movement disorder noted  Extremities - peripheral pulses normal, 1-2+ pedal edema, no clubbing or cyanosis  Skin - normal coloration and turgor, no rashes, no suspicious skin lesions noted        No orders of the defined types were placed in this encounter. ASSESSMENT:     Diagnosis Orders   1. Acute on chronic combined systolic and diastolic CHF (congestive heart failure) ([address]     2. Essential hypertension     3. Paroxysmal atrial fibrillation (HCC)     4. Descending aortic aneurysm ([facility]     5. Ascending aortic aneurysm ([facility]     6. Acute on chronic combined systolic (congestive) and diastolic (congestive) heart failure ([facility]     7. Nonobstructive CAD      PLAN:     Patient will need to continue to follow up with you for their general medical care     As always, aggressive risk factor modification is strongly recommended. We should adhere to the [address] guidelines for HTN management and the NCEP ATP III guidelines for LDL-C management. Cardiac diet is always recommended with low fat, cholesterol, calories and sodium. Continue medications at current doses. CT of chest for asc. Aorta aneurysm in 12 months. Check EKG next office visit    Cont with DOAC    Change PPI to once a day. Follow-up with thoracic surgery for chest tube    Patient was advised and encouraged to check blood pressure at home or at a pharmacy, maintain a logbook, and also call us back if blood pressure are above the target ranges or if it is low. Patient clearly understands and agrees to the instructions. We will need to continue to monitor muscle and liver enzymes, BUN, CR, and electrolytes. Details of medical condition explained and patient was warned about adverse consequences of uncontrolled medical conditions and possible side effects of prescribed medications.

## 2021-03-29 ENCOUNTER — OFFICE VISIT (OUTPATIENT)
Dept: CARDIOTHORACIC SURGERY | Age: 86
End: 2021-03-29

## 2021-03-29 VITALS — WEIGHT: 161 LBS | BODY MASS INDEX: 31.61 KG/M2 | HEART RATE: 103 BPM | HEIGHT: 60 IN | OXYGEN SATURATION: 98 %

## 2021-03-29 DIAGNOSIS — J90 PLEURAL EFFUSION: Primary | ICD-10-CM

## 2021-03-29 PROCEDURE — 99024 POSTOP FOLLOW-UP VISIT: CPT | Performed by: THORACIC SURGERY (CARDIOTHORACIC VASCULAR SURGERY)

## 2021-03-29 NOTE — PROGRESS NOTES
Patient about 4 weeks out from placement of a left Pleurx catheter for a recurring effusion. She is draining 3 times a week at home. The drainage has been anywhere from 200 to 550 cc but appears to be slowly tapering down. She does not notice any significant improvement in her breathing after drainage. She still gets a little short of breath with exertion. They report that the Pleurx insertion site is clean with no signs of infection. Lungs are clear bilaterally. Chest x-ray shows no evidence of recurring left pleural effusion. Well-functioning Pleurx catheter. They will continue 3 times a week drainage and call the office in 3 weeks to over drainage amounts. Once she drains less than 50ml in a row they will call for catheter removal.  They were concerned about her shortness of breath with exertion. I told him it does not appear this is due to the effusion since it appears to be well drained. Have advised that they get a pulse ox to carry with them so they can check her oxygen level when she says she is short of breath.

## 2021-03-30 DIAGNOSIS — I10 ESSENTIAL HYPERTENSION: Primary | ICD-10-CM

## 2021-03-31 DIAGNOSIS — I48.0 PAROXYSMAL ATRIAL FIBRILLATION (HCC): Primary | ICD-10-CM

## 2021-03-31 RX ORDER — DIGOXIN 125 MCG
125 TABLET ORAL DAILY
Qty: 90 TABLET | Refills: 3 | Status: SHIPPED | OUTPATIENT
Start: 2021-03-31 | End: 2022-04-04

## 2021-03-31 RX ORDER — CARVEDILOL 3.12 MG/1
3.12 TABLET ORAL 2 TIMES DAILY
Qty: 180 TABLET | Refills: 3 | Status: SHIPPED | OUTPATIENT
Start: 2021-03-31 | End: 2021-04-22 | Stop reason: DRUGHIGH

## 2021-03-31 RX ORDER — PANTOPRAZOLE SODIUM 40 MG/1
40 TABLET, DELAYED RELEASE ORAL DAILY
Qty: 90 TABLET | Refills: 3 | Status: SHIPPED | OUTPATIENT
Start: 2021-03-31 | End: 2022-02-11

## 2021-03-31 RX ORDER — FUROSEMIDE 20 MG/1
20 TABLET ORAL DAILY
Qty: 90 TABLET | Refills: 3 | Status: SHIPPED | OUTPATIENT
Start: 2021-03-31 | End: 2022-04-04

## 2021-03-31 NOTE — TELEPHONE ENCOUNTER
requesting medication refill.  Please approve or deny this request.    Rx requested:  Requested Prescriptions     Pending Prescriptions Disp Refills    rivaroxaban (XARELTO) 15 MG TABS tablet 30 tablet 0     Sig: Take 1 tablet by mouth daily         Last Office Visit:   3/25/2021      Next Visit Date:  Future Appointments   Date Time Provider Felecia Corrales   10/1/2021 10:00 AM Jorge Pratt [facility]

## 2021-04-14 DIAGNOSIS — I48.0 PAROXYSMAL ATRIAL FIBRILLATION (HCC): ICD-10-CM

## 2021-04-14 NOTE — TELEPHONE ENCOUNTER
requesting medication refill.  Please approve or deny this request.    Rx requested:  Requested Prescriptions      No prescriptions requested or ordered in this encounter         Last Office Visit:   3/25/2021      Next Visit Date:  Future Appointments   Date Time Provider Felecia Corrales   10/1/2021 10:00 AM Jorge Blunt [facility]     Requesting 90 day supply/rx sent

## 2021-04-20 ENCOUNTER — TELEPHONE (OUTPATIENT)
Dept: CARDIOLOGY CLINIC | Age: 86
End: 2021-04-20

## 2021-04-20 DIAGNOSIS — I10 ESSENTIAL HYPERTENSION: ICD-10-CM

## 2021-04-20 NOTE — TELEPHONE ENCOUNTER
Patient's son call to say his mother's BP has been running high this month. Readings after medication 163/90,165/95,163/120,154/97 HR is fine. Patient's son wants to know if her medication should be changed?

## 2021-04-22 RX ORDER — CARVEDILOL 3.12 MG/1
6.25 TABLET ORAL 2 TIMES DAILY
Qty: 180 TABLET | Refills: 3 | Status: SHIPPED
Start: 2021-04-22 | End: 2021-06-01 | Stop reason: DRUGHIGH

## 2021-04-26 ENCOUNTER — TELEPHONE (OUTPATIENT)
Dept: CARDIOLOGY CLINIC | Age: 86
End: 2021-04-26

## 2021-04-26 DIAGNOSIS — I10 ESSENTIAL HYPERTENSION: Primary | ICD-10-CM

## 2021-04-26 RX ORDER — AMLODIPINE BESYLATE 5 MG/1
5 TABLET ORAL DAILY
Qty: 30 TABLET | Refills: 1 | Status: SHIPPED | OUTPATIENT
Start: 2021-04-26 | End: 2021-06-24 | Stop reason: SDUPTHER

## 2021-04-26 NOTE — TELEPHONE ENCOUNTER
Patient's son calling states he doubled the carvedilol but states patient's blood pressure continues to increase. 174/100, 178/97.  Please advise

## 2021-05-28 ENCOUNTER — OFFICE VISIT (OUTPATIENT)
Dept: CARDIOTHORACIC SURGERY | Age: 86
End: 2021-05-28
Payer: MEDICARE

## 2021-05-28 ENCOUNTER — HOSPITAL ENCOUNTER (OUTPATIENT)
Dept: GENERAL RADIOLOGY | Age: 86
Discharge: HOME | End: 2021-05-30
Payer: MEDICARE

## 2021-05-28 VITALS — WEIGHT: 161 LBS | BODY MASS INDEX: 31.61 KG/M2 | HEART RATE: 64 BPM | OXYGEN SATURATION: 92 % | HEIGHT: 60 IN

## 2021-05-28 DIAGNOSIS — Z46.82 ENCOUNTER FOR CHEST TUBE REMOVAL: Primary | ICD-10-CM

## 2021-05-28 DIAGNOSIS — J90 PLEURAL EFFUSION: ICD-10-CM

## 2021-05-28 DIAGNOSIS — J90 PLEURAL EFFUSION: Primary | ICD-10-CM

## 2021-05-28 PROCEDURE — G8427 DOCREV CUR MEDS BY ELIG CLIN: HCPCS | Performed by: THORACIC SURGERY (CARDIOTHORACIC VASCULAR SURGERY)

## 2021-05-28 PROCEDURE — 1090F PRES/ABSN URINE INCON ASSESS: CPT | Performed by: THORACIC SURGERY (CARDIOTHORACIC VASCULAR SURGERY)

## 2021-05-28 PROCEDURE — 32552 REMOVE LUNG CATHETER: CPT | Performed by: THORACIC SURGERY (CARDIOTHORACIC VASCULAR SURGERY)

## 2021-05-28 PROCEDURE — 99213 OFFICE O/P EST LOW 20 MIN: CPT | Performed by: THORACIC SURGERY (CARDIOTHORACIC VASCULAR SURGERY)

## 2021-05-28 PROCEDURE — 4040F PNEUMOC VAC/ADMIN/RCVD: CPT | Performed by: THORACIC SURGERY (CARDIOTHORACIC VASCULAR SURGERY)

## 2021-05-28 PROCEDURE — 1123F ACP DISCUSS/DSCN MKR DOCD: CPT | Performed by: THORACIC SURGERY (CARDIOTHORACIC VASCULAR SURGERY)

## 2021-05-28 PROCEDURE — G8417 CALC BMI ABV UP PARAM F/U: HCPCS | Performed by: THORACIC SURGERY (CARDIOTHORACIC VASCULAR SURGERY)

## 2021-05-28 PROCEDURE — 71045 X-RAY EXAM CHEST 1 VIEW: CPT

## 2021-05-28 PROCEDURE — 1036F TOBACCO NON-USER: CPT | Performed by: THORACIC SURGERY (CARDIOTHORACIC VASCULAR SURGERY)

## 2021-05-28 ASSESSMENT — ENCOUNTER SYMPTOMS
COUGH: 0
VOICE CHANGE: 0
DIARRHEA: 0
STRIDOR: 0
ABDOMINAL DISTENTION: 0
CHOKING: 0
SHORTNESS OF BREATH: 0
VOMITING: 0
TROUBLE SWALLOWING: 0
CHEST TIGHTNESS: 0
WHEEZING: 0
NAUSEA: 0
SORE THROAT: 0
ABDOMINAL PAIN: 0

## 2021-05-28 NOTE — PATIENT INSTRUCTIONS
Leave dressing in place for 2 days. May sponge bath only for 2 days. After 2 days may remove dressing and shower. After showering daily replace the dressing with either a Band-Aid or gauze until the incision has scabbed closed. No soaking underwater for 1 week. There may be some drainage over the next day or 2. If this happens replace the gauze as needed. It may be advisable to place a towel on the bed for 2 days to make sure no drainage occurs at night. No follow-up with me as needed but if shortness of breath starts recurring then a chest x-ray either by myself or her primary care doctor will show if the fluid is returning.

## 2021-05-28 NOTE — PROGRESS NOTES
Subjective:      Patient ID: Caesar Phoenix is a 80 y.o. female who presents today for: Removal of Pleurx catheter  Chief Complaint   Patient presents with    Other       HPI  Patient had a left Pleurx catheter placed several months ago for a recurring pleural effusion. It has been slowly tapering down. Over the last 2 drainages it drained only 75 cc and then 40 cc. Patient denies any increasing shortness of breath. I did explain that the company recommends less than 50 cc for 3 drainages in a row before removal.  I discussed the options and they would prefer to have it removed today and see what happens. Past Medical History:   Diagnosis Date    Ascending aortic aneurysm ([address] 6/23/2017    Charcot Arleen Tooth muscular atrophy     CHF (congestive heart failure) (HCC)     Depression     Descending aortic aneurysm ([address] 6/23/2017    Essential hypertension 2/16/2018    Headache     HTN (hypertension)     Impaired mobility and activities of daily living     Lumbar stenosis with neurogenic claudication     Myelopathy ([address]     Osteoarthritis       Past Surgical History:   Procedure Laterality Date    JOINT REPLACEMENT Bilateral     knees    PLEURAL CATH INSERTION Left 2/25/2021    LEFT PLEURAL CATHETER INSERTION performed by Mat Fitzgerald MD at [address] Left 01/29/2021    total of 755 cc removed per Dr Sin Jane specimen sent to lab     Social History     Socioeconomic History    Marital status:       Spouse name: Not on file    Number of children: Not on file    Years of education: Not on file    Highest education level: Not on file   Occupational History    Not on file   Tobacco Use    Smoking status: Never Smoker    Smokeless tobacco: Never Used   Substance and Sexual Activity    Alcohol use: No     Alcohol/week: 0.0 standard drinks    Drug use: No    Sexual activity: Not on file   Other Topics Concern    Not on file   Social History Narrative    Lives With: Alone, son lives down the street, dtr is in the area    Has a son in the area    Type of Home: [address]: One level    Home Access: Stairs to enter with rails- Number of Steps: 2- Rails: Both    Bathroom Shower/Tub: Tub/Shower unit, Bathroom Equipment: Grab bars in shower, Shower chair    Home Equipment: Rolling walker, Cane(Pt infrequemtly uses DME for ambulation and prefers to furniture walk in home)    ADL Assistance: [address] [place] [address] Avenue: [place] [address] Responsibilities: Yes    Ambulation Assistance: Independent, Transfer Assistance: Independent    Additional Comments: Son stops over 2 times daily         Social Determinants of Health     Financial Resource Strain:     Difficulty of Paying Living Expenses:    Food Insecurity:     Worried About Running Out of Food in the Last Year:     [address] in the Last Year:    Transportation Needs:     Lack of Transportation (Medical):  Lack of Transportation (Non-Medical):    Physical Activity: Inactive    Days of Exercise per Week: 0 days    Minutes of Exercise per Session: 0 min   Stress: No Stress Concern Present    Feeling of Stress : Only a little   Social Connections: Moderately Isolated    Frequency of Communication with Friends and Family: More than three times a week    Frequency of Social Gatherings with Friends and Family: More than three times a week    Attends Hinduism Services: 1 to 4 times per year    Active Member of [employer] Group or Organizations: No    Attends Club or Organization Meetings: Never    Marital Status:     Intimate Partner Violence: Not At Risk    Fear of Current or Ex-Partner: No    Emotionally Abused: No    Physically Abused: No    Sexually Abused: No     Family History   Problem Relation Age of Onset    Arthritis Mother     Arthritis Father     High Blood Pressure Father      Allergies   Allergen Reactions    Latex     Tape [Adhesive Honey Ya change, appetite change, chills, diaphoresis, fatigue, fever and unexpected weight change. HENT: Negative for sore throat, trouble swallowing and voice change. Eyes: Negative for visual disturbance. Respiratory: Negative for cough, choking, chest tightness, shortness of breath, wheezing and stridor. Cardiovascular: Negative for chest pain, palpitations and leg swelling. Gastrointestinal: Negative for abdominal distention, abdominal pain, diarrhea, nausea and vomiting. Genitourinary: Negative for difficulty urinating. Musculoskeletal: Negative for gait problem and joint swelling. Skin: Negative for rash and wound. Neurological: Negative for dizziness, seizures and light-headedness. Hematological: Negative for adenopathy. Does not bruise/bleed easily. Psychiatric/Behavioral: Negative for behavioral problems and confusion. Objective:   Pulse 64   Ht 5' (1.524 m)   Wt 161 lb (73 kg)   SpO2 92%   BMI 31.44 kg/m²     Physical Exam  Lungs are clear. Radiographs and Laboratory Studies:     Diagnostic Imaging Studies:    Chest x-ray is stable compared to chest x-ray from almost 2 months ago. Assessment/Plan     The Pleurx catheter insertion site was cleaned with alcohol swabs. Approximately 10 cc 1% lidocaine was used infiltrate the skin and subcutaneous tissue around the insertion site. Using careful blunt dissection with a pair of scissors the fibrous cuff was dissected free. The catheter was removed. The wound was covered with sterile gauze. Successful movable of Pleurx catheter. Instructions were given to the patient and her son. I will see her on an as-needed basis. Diagnosis Orders   1. Pleural effusion       No orders of the defined types were placed in this encounter. No orders of the defined types were placed in this encounter. Return if symptoms worsen or fail to improve.       Marta Mcclure MD

## 2021-06-01 DIAGNOSIS — I10 ESSENTIAL HYPERTENSION: ICD-10-CM

## 2021-06-01 RX ORDER — CARVEDILOL 6.25 MG/1
6.25 TABLET ORAL 2 TIMES DAILY
Qty: 180 TABLET | Refills: 2 | Status: SHIPPED | OUTPATIENT
Start: 2021-06-01 | End: 2022-01-17

## 2021-06-01 NOTE — TELEPHONE ENCOUNTER
requesting medication refill.  Please approve or deny this request.    Rx requested:  Requested Prescriptions     Pending Prescriptions Disp Refills    carvedilol (COREG) 6.25 MG tablet 180 tablet 2     Sig: Take 1 tablet by mouth 2 times daily         Last Office Visit:   3/25/2021      Next Visit Date:  Future Appointments   Date Time Provider Felecia Corrales   10/1/2021 10:00 AM Jorge Victoria Stager,  [facility]

## 2021-06-24 DIAGNOSIS — I10 ESSENTIAL HYPERTENSION: ICD-10-CM

## 2021-06-24 RX ORDER — AMLODIPINE BESYLATE 5 MG/1
5 TABLET ORAL DAILY
Qty: 30 TABLET | Refills: 1 | Status: SHIPPED | OUTPATIENT
Start: 2021-06-24 | End: 2021-08-19 | Stop reason: SDUPTHER

## 2021-06-24 NOTE — TELEPHONE ENCOUNTER
Patients son is requesting medication refill.  Please approve or deny this request.    Rx requested:  Requested Prescriptions      No prescriptions requested or ordered in this encounter         Last Office Visit:   3/25/2021      Next Visit Date:  Future Appointments   Date Time Provider Felecia Corrales   10/1/2021 10:00 AM Wes Lenward Sandhoff, [facility]

## 2021-08-19 DIAGNOSIS — I10 ESSENTIAL HYPERTENSION: ICD-10-CM

## 2021-08-19 RX ORDER — AMLODIPINE BESYLATE 5 MG/1
5 TABLET ORAL DAILY
Qty: 30 TABLET | Refills: 5 | Status: SHIPPED | OUTPATIENT
Start: 2021-08-19 | End: 2022-02-22 | Stop reason: SDUPTHER

## 2021-10-01 ENCOUNTER — OFFICE VISIT (OUTPATIENT)
Dept: CARDIOLOGY CLINIC | Age: 86
End: 2021-10-01
Payer: MEDICARE

## 2021-10-01 VITALS — OXYGEN SATURATION: 96 % | SYSTOLIC BLOOD PRESSURE: 120 MMHG | DIASTOLIC BLOOD PRESSURE: 80 MMHG | HEART RATE: 67 BPM

## 2021-10-01 DIAGNOSIS — I48.0 PAROXYSMAL ATRIAL FIBRILLATION (HCC): Primary | ICD-10-CM

## 2021-10-01 PROCEDURE — G8427 DOCREV CUR MEDS BY ELIG CLIN: HCPCS | Performed by: INTERNAL MEDICINE

## 2021-10-01 PROCEDURE — 1123F ACP DISCUSS/DSCN MKR DOCD: CPT | Performed by: INTERNAL MEDICINE

## 2021-10-01 PROCEDURE — 99214 OFFICE O/P EST MOD 30 MIN: CPT | Performed by: INTERNAL MEDICINE

## 2021-10-01 PROCEDURE — 1036F TOBACCO NON-USER: CPT | Performed by: INTERNAL MEDICINE

## 2021-10-01 PROCEDURE — 93000 ELECTROCARDIOGRAM COMPLETE: CPT | Performed by: INTERNAL MEDICINE

## 2021-10-01 PROCEDURE — G8417 CALC BMI ABV UP PARAM F/U: HCPCS | Performed by: INTERNAL MEDICINE

## 2021-10-01 PROCEDURE — G8484 FLU IMMUNIZE NO ADMIN: HCPCS | Performed by: INTERNAL MEDICINE

## 2021-10-01 PROCEDURE — 1090F PRES/ABSN URINE INCON ASSESS: CPT | Performed by: INTERNAL MEDICINE

## 2021-10-01 PROCEDURE — 4040F PNEUMOC VAC/ADMIN/RCVD: CPT | Performed by: INTERNAL MEDICINE

## 2021-10-01 NOTE — PROGRESS NOTES
Chief Complaint   Patient presents with    Atrial Fibrillation    Hypertension    6 Month Follow-Up       5-12-16: Patient presents for initial medical evaluation. Patient is followed on a regular basis by Dr. Ebenezer Hurt MD. S/p hospitalization for hip pain. Was seen by us for bradycardia and RBBB. She was asymptomatic at that time. Her cardizem was lowered to 240mg daily. HR is in 70's today. Pt denies chest pain, dyspnea, dyspnea on exertion, change in exercise capacity, fatigue,  nausea, vomiting, diarrhea, constipation, motor weakness, insomnia, weight loss, syncope, dizziness, lightheadedness, palpitations, PND, orthopnea, or claudication                   no hx of MI, CHF or arrhythmia. No hx of LHC. No recent stress test. Does have back and right hip pain. 6-9-16: needs to have back surgery with DR. Aria Wallace. S/p ECHO with EF of 60%, mild LVH, grade I DD, mild TR, + IAS aneurysm with ? Left to right PFO, aorta aneurysm noted meauring  4.2-4.5cm. Pt denies chest pain, dyspnea, dyspnea on exertion, change in exercise capacity, fatigue,  nausea, vomiting, diarrhea, constipation, motor weakness, insomnia, weight loss, syncope, dizziness, lightheadedness, palpitations, PND, orthopnea. BP and HR are good. 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 No dissection. Descending aorta measures 3.6cm. Pt denies chest pain, dyspnea, dyspnea on exertion, change in exercise capacity, fatigue,  nausea, vomiting, diarrhea, constipation, motor weakness, insomnia, weight loss, syncope, dizziness, lightheadedness, palpitations, PND, orthopnea, or claudication. Back surgery is tomorrow. 9-27-16: as above, doing well overall. Pt denies chest pain, dyspnea, dyspnea on exertion, change in exercise capacity, fatigue,  nausea, vomiting, diarrhea, constipation, motor weakness, insomnia, weight loss, syncope, dizziness, lightheadedness, palpitations, PND, orthopnea, or claudication.  No hospitalizations. No CHF type symptoms. No falls. 6-23-17: states she has been tired and fatigues. One time her BP was low in 70-80's. Improved with rest and eating. Was having nose bleeds. Her losartan was increased to BID. No pleural effusions. No pneumothoraces. There is no significant periaortic, pretracheal or perihilar adenopathy. Again note is made of aneurysmal dilatation of the ascending arch of the aorta. It measures 4.3 cm, unchanged. Again, note is made    aneurysmal dilatation of descending thoracic aorta. It measures approximately 3.9 cm, unchanged.       Within the field-of-view of the abdomen, note is made of a small to moderate hiatal hernia. 8-12-17: s/p hospitalization for CP and SOB. S/p normal LHC. She did have an SVT episode s/p ablation with dr. Asuncion Pritchett. Her home BP readings are ok. Son states her HR is in the 40's at times. Her cardizem was lowered to one pill a day. Pt denies chest pain, , change in exercise capacity, fatigue,  nausea, vomiting, diarrhea, constipation, motor weakness, insomnia, weight loss, syncope, dizziness, lightheadedness, palpitations, PND, orthopnea. She was placed on [address] [place] [address] Road due to likely Afibb or hx of DVT/PE 5-7 yrs ago per family. S/p CTA of chest with no PE and stable aortic aneurysm measuing 4.3cm. Continues to have SOB. S/p ECHO    Summary   Severe annular calcification.   Mild to moderate (2+) mitral regurgitation is present.   Normal aortic valve structure and function.   Normal tricuspid valve structure and function.   Normal pulmonic valve structure and function.   Normal left atrium.   Left ventricular ejection fraction is visually estimated at 50%.  Impaired relaxation compatible with diastolic dysfunction. ( reversed E/A   ratio)   Mild concentric left ventricular hypertrophy.   Normal right atrium.   dilated RV chamber.  , RVSP of 24mHg.    Normal right ventricle systolic pressure.   No evidence of pericardial effusion.   No evidence of pleural effusion. 2-16-18: Pt denies chest pain, dyspnea, dyspnea on exertion, change in exercise capacity, fatigue,  nausea, vomiting, diarrhea, constipation, motor weakness, insomnia, weight loss, syncope, dizziness, lightheadedness, palpitations, PND, orthopnea, or claudication. No nitro use. BP and hr are good. CAD is stable. No LE discoloration or ulcers. No LE edema. No CHF type symptoms. Lipid profile is normal.    states her Bp is labile. No further SVT episodes. On Eliquis and no bleeding issues. Did have follow up with Pulm and refused HUNTER testing. Airway resistance and airway conductance were both normal.     OVERALL IMPRESSION:  This study shows no significant obstructive  pattern but there was improvement in airflow after bronchodilator  therapy suggesting mild reactive airway disease. There was no  restrictive or diffusion impairment noted on this study. 8-24-18: having labile BP readings a times. Pt denies chest pain, dyspnea, dyspnea on exertion, change in exercise capacity, fatigue,  nausea, vomiting, diarrhea, constipation, motor weakness, insomnia, weight loss, syncope, dizziness, lightheadedness, palpitations, PND, orthopnea, or claudication. No nitro use. BP and hr are good. CAD is stable. No LE discoloration or ulcers. No LE edema. No CHF type symptoms. Lipid profile is normal. No recent hospitalization. No change in meds. Known MR of 2+. On NOAC, no bleeding issues. 2-29-19: on eliquis. Was having nose bleeds and was referred to ENT. Back on Eliquis now. On ASA. Pt denies chest pain, dyspnea, dyspnea on exertion, change in exercise capacity, fatigue,  nausea, vomiting, diarrhea, constipation, motor weakness, insomnia, weight loss, syncope, dizziness, lightheadedness, palpitations, PND, orthopnea, or claudication. No nitro use. BP and hr are good. CAD is stable. No LE discoloration or ulcers. No LE edema. No CHF type symptoms.  Lipid profile is normal. No recent hospitalization. No change in meds. EKG with NSR. Was placed on [address] after developing DVT post ortho surgery. No documented hx of Afibb. 10/2/2020: Patient is doing well overall. She feels good. She is only left the house twice in 6 months due to coronavirus pandemic. Patient with history of a sending aorta aneurysm measuring 4.3 cm. She has a known history of mitral regurgitation 2+. She was taken off of oral anticoagulation after DVT post Ortho surgery. She does not have any documented history of atrial fibrillation. Blood pressure is 116/70 heart rate of 67 bpm.  EKG with normal sinus rhythm, right bundle branch block morphology. Patient with history of cardiac catheterization that showed normal coronary arteries. Positive history of SVT ablation. Status post CT of the chest on March 15, 2019 that showed no change of her a sending aorta aneurysm measuring  4.3 cm by 4.3 cm at the level of the bifurcation of the pulmonary arteries, descending thoracic aorta measures 4.2 x 4.9 cm at the level of the right inferior pulmonary vein. 2/22/2020: Patient is a 80 y. o. female who presents with a chief complaint of SOB.  Patient is followed on a regular basis by [facility], [place]. 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 significant shortness of breath as well as rapid heartbeat and chest pain.   Patient was noted to be in acute decompensated combined heart failure.  She was started on IV diuretics.  She was also developed paroxysmal atrial fibrillation started on oral anticoagulation.  She underwent cardiac catheterization for elevated troponins as well as chest pain and a presentation of acute decompensated heart failure and ejection fraction of 40% was noted to have moderate mid LAD stenosis with negative IFR.  Patient underwent left pleural effusion thoracentesis x2 by interventional radiology. Karis Ballesteros was seen by pulmonary as well.  She was transferred to rehab previously and developed worsening shortness of breath as well as O2 desaturation and transferred to [facility]. Patient was noted to have urinary retention and a Zeng was placed and urology was consulted. Patient also continues to have left pleural effusion and thoracic surgery is consulted for possible Pleurx catheter. She is currently seen in rehab and doing okay. She denies any chest discomfort. She has low blood pressure and some blood pressure medications are on hold        3/25/2021: Status post hospitalization at [facility] for acute decompensated heart failure/bilateral pleural effusions. Noted to be in paroxysmal atrial fibrillation and started on oral anticoagulation. Patient had a long hospitalization as well as multiple thoracentesis. She required rehab stay. Patient was noted to have urinary retention at that time. She underwent cardiac catheterization with moderate mid LAD stenosis status post negative IFR, ejection fraction of 40%. Patient is on Xarelto. She is on Coreg as well as digoxin. Renal function is normal.  Hemoglobin is stable. 10-1-21: doing ok. On home O2. Hx of CHF. Patient with history of congestive heart failure and pleural effusions with history of thoracentesis. History of paroxysmal atrial fibrillation and she is on oral anticoagulation  She underwent cardiac catheterization with moderate mid LAD stenosis status post negative IFR, ejection fraction of 40%. Patient is on Xarelto. She is on Coreg as well as digoxin. Blood pressure and heart rate are within normal limits today.   EKG with normal sinus rhythm nonspecific ST changes    Patient Active Problem List   Diagnosis    Lumbar stenosis with neurogenic claudication    Acquired scoliosis    Osteoporosis    Hilda Estes muscular atrophy    Excess weight    Osteoarthritis of lumbar spine with myelopathy    Osteoarthritis    Myelopathy (HCC)    Impaired mobility and activities of daily living due to CHF [facility] re-admit 2/19/2021    Headache    Depression    Ascending aortic aneurysm (HCC)    Descending aortic aneurysm (HCC)    Dizziness    Shortness of breath    Essential hypertension    Acute on chronic combined systolic and diastolic CHF (congestive heart failure) (HCC)    Gait abnormality    Paroxysmal atrial fibrillation (HCC)    Pleural effusion    Hypoxia    Acute pulmonary edema (HCC)    Acute on chronic combined systolic (congestive) and diastolic (congestive) heart failure (HCC)    Impaired mobility    Acute cystitis with hematuria       Past Surgical History:   Procedure Laterality Date    JOINT REPLACEMENT Bilateral     knees    PLEURAL CATH INSERTION Left 2/25/2021    LEFT PLEURAL CATHETER INSERTION performed by Erasto Hanks MD at [address] Left 01/29/2021    total of 755 cc removed per Dr Iman Maxwell specimen sent to lab       Social History     Socioeconomic History    Marital status:       Spouse name: Not on file    Number of children: Not on file    Years of education: Not on file    Highest education level: Not on file   Occupational History    Not on file   Tobacco Use    Smoking status: Never Smoker    Smokeless tobacco: Never Used   Substance and Sexual Activity    Alcohol use: No     Alcohol/week: 0.0 standard drinks    Drug use: No    Sexual activity: Not on file   Other Topics Concern    Not on file   Social History Narrative    Lives With: Alone, son lives down the street, dtr is in the area    Has a son in the area    Type of Home[place] [address]: One level    Home Access: Stairs to enter with rails- Number of Steps: 2- Rails: Both    Bathroom Shower/Tub: Tub/Shower unit, [employer]: Grab bars in shower, Shower chair    Home Equipment: Rolling walker, Cane(Pt infrequemtly uses DME for ambulation and prefers to furniture walk in home)    ADL Assistance: [address]: Independent    Homemaking Responsibilities: Yes    Ambulation Assistance: Independent, Transfer Assistance: Independent    Additional Comments: Son stops over 2 times daily         Social Determinants of Health     Financial Resource Strain:     Difficulty of Paying Living Expenses:    Food Insecurity:     Worried About Running Out of Food in the Last Year:     [address] in the Last Year:    Transportation Needs:     Lack of Transportation (Medical):  Lack of Transportation (Non-Medical):    Physical Activity: Inactive    Days of Exercise per Week: 0 days    Minutes of Exercise per Session: 0 min   Stress: No Stress Concern Present    Feeling of Stress : Only a little   Social Connections: Moderately Isolated    Frequency of Communication with Friends and Family: More than three times a week    Frequency of Social Gatherings with Friends and Family: More than three times a week    Attends Congregational Services: 1 to 4 times per year    Active Member of [address] [place] [address] [place] or Organizations: No    Attends Club or Organization Meetings: Never    Marital Status:     Intimate Partner Violence: Not At Risk    Fear of Current or Ex-Partner: No    Emotionally Abused: No    Physically Abused: No    Sexually Abused: No       Family History   Problem Relation Age of Onset    Arthritis Mother     Arthritis Father     High Blood Pressure Father        Current Outpatient Medications   Medication Sig Dispense Refill    amLODIPine (NORVASC) 5 MG tablet Take 1 tablet by mouth daily 30 tablet 5    KLOR-CON M20 20 MEQ extended release tablet TAKE 1 TABLET DAILY WITH BREAKFAST (MUST CALL OFFICE FOR FOLLOW UP) 90 tablet 2    carvedilol (COREG) 6.25 MG tablet Take 1 tablet by mouth 2 times daily 180 tablet 2    rivaroxaban (XARELTO) 15 MG TABS tablet Take 1 tablet by mouth daily 90 tablet 2    digoxin (LANOXIN) 125 MCG tablet Take 1 tablet by mouth daily 90 tablet 3    furosemide (LASIX) 20 MG tablet Take 1 tablet by mouth daily 90 tablet 3    pantoprazole (PROTONIX) 40 MG tablet Take 1 tablet by mouth daily 90 tablet 3    nitroGLYCERIN (NITROSTAT) 0.4 MG SL tablet up to max of 3 total doses. If no relief after 1 dose, call 911. 25 tablet 3    budesonide-formoterol (SYMBICORT) 160-4.5 MCG/ACT AERO Inhale 2 puffs into the lungs 2 times daily 1 Inhaler 3    ferrous sulfate (IRON 325) 325 (65 Fe) MG tablet Take 1 tablet by mouth 2 times daily (with meals) 30 tablet 3    simethicone (MYLICON) 80 MG chewable tablet Take 1 tablet by mouth every 6 hours as needed for Flatulence 180 tablet 3    Carboxymethylcellulose Sodium (EYE DROPS OP) Apply 1 drop to eye as needed (Dry eyes) Indications: Systane       [place]-Glucosamine-Vit D (OSTEO BI-FLEX ONE PER DAY) TABS Take by mouth daily      Calcium Carbonate-Vitamin D (CALTRATE 600+D PO) Take by mouth daily      Multiple Vitamins-Minerals (CENTRUM SILVER ULTRA WOMENS) TABS Take by mouth daily      vitamin B-12 (CYANOCOBALAMIN) 1000 MCG tablet Take 1,000 mcg by mouth daily      aspirin 81 MG tablet Take 81 mg by mouth daily       citalopram (CELEXA) 20 MG tablet Take 20 mg by mouth daily       SYNTHROID 88 MCG tablet Take 88 mcg by mouth daily        No current facility-administered medications for this visit. Latex and Tape [adhesive tape]    Review of Systems:  General ROS: negative  Psychological ROS: negative  Hematological and Lymphatic ROS: No history of blood clots or bleeding disorder.    Respiratory ROS: no cough, shortness of breath, or wheezing  Cardiovascular ROS: no chest pain or dyspnea on exertion  Gastrointestinal ROS: no abdominal pain, change in bowel habits, or black or bloody stools  Genito-Urinary ROS: no dysuria, trouble voiding, or hematuria  Musculoskeletal ROS: negative  Neurological ROS: no TIA or stroke symptoms  Dermatological ROS: negative    VITALS:  Blood pressure 120/80, pulse 67, SpO2 96 %. There is no height or weight on file to calculate BMI. Physical Examination:  General appearance - alert, well appearing, and in no distress and overweight  Mental status - alert, oriented to person, place, and time  Neck - Neck is supple, no JVD or carotid bruits. No thyromegaly or adenopathy. Chest - clear to auscultation, no wheezes, rales or rhonchi, symmetric air entry  Heart - normal rate, regular rhythm, normal S1, S2, no murmurs, rubs, clicks or gallops  Abdomen - soft, nontender, nondistended, no masses or organomegaly  Neurological - alert, oriented, normal speech, no focal findings or movement disorder noted  Extremities - peripheral pulses normal, 1-2+ pedal edema, no clubbing or cyanosis  Skin - normal coloration and turgor, no rashes, no suspicious skin lesions noted        Orders Placed This Encounter   Procedures    EKG 12 Lead       ASSESSMENT:     Diagnosis Orders   1. Paroxysmal atrial fibrillation (HCC)  EKG 12 Lead   7. Nonobstructive CAD      PLAN:     Patient will need to continue to follow up with you for their general medical care     As always, aggressive risk factor modification is strongly recommended. We should adhere to the [address] guidelines for HTN management and the NCEP ATP III guidelines for LDL-C management. Cardiac diet is always recommended with low fat, cholesterol, calories and sodium. Continue medications at current doses. CT of chest for asc. Aorta aneurysm in 12 months. Check echo next OV    Check EKG    Cont with DOAC    Change PPI to once a day.      Follow-up with thoracic surgery for chest tube    Patient was advised and encouraged to check blood pressure at home or at a pharmacy, maintain a logbook, and also call us back if blood pressure are above the target ranges or if it is low. Patient clearly understands and agrees to the instructions. We will need to continue to monitor muscle and liver enzymes, BUN, CR, and electrolytes. Details of medical condition explained and patient was warned about adverse consequences of uncontrolled medical conditions and possible side effects of prescribed medications.

## 2022-01-03 DIAGNOSIS — I48.0 PAROXYSMAL ATRIAL FIBRILLATION (HCC): ICD-10-CM

## 2022-01-03 RX ORDER — RIVAROXABAN 15 MG/1
TABLET, FILM COATED ORAL
Qty: 90 TABLET | Refills: 3 | Status: SHIPPED | OUTPATIENT
Start: 2022-01-03 | End: 2022-08-17

## 2022-01-03 NOTE — TELEPHONE ENCOUNTER
Please approve or deny this refill request. The order is pended. Thank you.     LOV 10/1/2021    Next Visit Date:  Future Appointments   Date Time Provider Felecia Corrales   4/1/2022 11:00 AM Jorge Carson [facility]

## 2022-01-16 DIAGNOSIS — I10 ESSENTIAL HYPERTENSION: ICD-10-CM

## 2022-01-17 RX ORDER — CARVEDILOL 6.25 MG/1
TABLET ORAL
Qty: 180 TABLET | Refills: 3 | Status: SHIPPED | OUTPATIENT
Start: 2022-01-17

## 2022-01-17 NOTE — TELEPHONE ENCOUNTER
Please approve or deny this refill request. The order is pended. Thank you.     LOV 10/1/2021    Next Visit Date:  Future Appointments   Date Time Provider Felecia Corrales   4/1/2022 11:00 AM Wes Caryle Osler, [facility]

## 2022-01-26 NOTE — CONSULTS
Physical Medicine & Rehabilitation  Consult Note      Admitting Physician: Preet Xiong MD    Primary Care Provider: Mellissa Castleman, MD     Reason for Consult:  Asses rehab needs, promote physical and mental function, and decrease likelihood of re-admit to the hospital after discharge. History of Present Illness:    Amanda Mohr is a 80 y.o. female admitted to [facility] on 2/9/2021. Elderly female with complicated medical history was discharged off the acute rehabilitation unit because of hypoxia and acute onset of CHF. She had had a previous pleurocentesis for pleural effusion prior to her rehab stay. Continues to be diuresed overall however she looks a lot better and is breathing better and resting quietly today. Fatigue  This is a recurrent problem. The current episode started in the past 7 days. Associated symptoms include congestion, fatigue, myalgias and weakness. Pertinent negatives include no abdominal pain, chest pain, chills, coughing, diaphoresis, fever, headaches, nausea, neck pain, rash, sore throat or vomiting. I reviewed recent nursing notes, \" Report called to 1w. Pt changed over to [facility] for transfer as hi-flow tubing does not connect. HS meds administered. VSS. Belongings packed. LBM 2/8/21. No distress noted \". Their inpatient work up has included:    Imaging:    Imaging and other studies reviewed and discussed with patient and staff    Echo Complete 1/27/2021   Left Ventricle Left ventricular ejection fraction is visually estimated at 40%. E/A flow reversal noted. Suggestive of diastolic dysfunction. Right Ventricle Normal right ventricle structure and function. Normal right ventricle systolic pressure. Left Atrium Moderately dilated left atrium. Right Atrium Moderately enlarged right atrium size. Mitral Valve Mitral annular calcification is present. 1+ MR Tricuspid Valve Normal tricuspid valve structure and function.  1-2+ TR RVSP 38 mmHg Aortic Valve Aortic valve leaflets are moderately thickened. No AS No AR Pulmonic Valve The pulmonic valve was not well visualized . Pericardial Effusion No evidence of pericardial effusion. Pleural Effusion No evidence of pleural effusion. Aorta \ Miscellaneous The aorta is within normal limits. M-Mode Measurements (cm)  LVIDd: 4.39 cm                         LVIDs: 3.55 cm  IVSd: 1.43 cm                          IVSs: 1.55 cm  LVPWd: 1.5 cm                          LVPWs: 1.48 cm  Rt. Vent.  Dimension: 3.74 cm           AO Root Dimension: 3.62 cm                                         ACS: 1.25 cm                                         LA: 4.54 cm                                         LVOT: 2.04 cm Doppler Measurements:  AV Velocity:0.02 m/s                    MV Peak E-Wave: 0.85 m/s  AV Peak Gradient: 10.44 mmHg            MV Peak A-Wave: 0.87 m/s  AV Mean Gradient: 6.01 mmHg  AV Area (Continuity):1.83 cm^2  TR Velocity:2.62 m/s                    Estimated RAP:10 mmHg  TR Gradient:27.54 mmHg                  RVSP:37.54 mmHg Valves  Mitral Valve  Peak E-Wave: 0.85 m/s                 Peak A-Wave: 0.87 m/s                                        E/A Ratio: 0.97                                        Peak Gradient: 2.88 mmHg                                        Deceleration Time: 232.8 msec  Tissue Doppler  E' Septal Velocity: 0.07 m/s  E' Lateral Velocity: 0.08 m/s  Aortic Valve  Peak Velocity: 1.62 m/s                Mean Velocity: 1.15 m/s  Peak Gradient: 10.44 mmHg              Mean Gradient: 6.01 mmHg  Area (continuity): 1.83 cm^2           Area (2D): 1.8 cm^2  AV VTI: 28.8 cm  Cusp Separation: 1.25 cm  Tricuspid Valve  Estimated RVSP: 37.54 mmHg              Estimated RAP: 10 mmHg  TR Velocity: 2.62 m/s                   TR Gradient: 27.54 mmHg  Pulmonic Valve  Peak Velocity: 0.96 m/s           Peak Gradient: 3.66 mmHg                                    Estimated PASP: 37.54 mmHg  LVOT  Peak Velocity: 0.89 m/s              Mean Velocity: 0.57 m/s  Peak Gradient: 3.16 mmHg             Mean Gradient: 1.58 mmHg  LVOT Diameter: 2.04 cm               LVOT VTI: 16.15 cm Structures  Left Atrium  LA Dimension: 4.54 cm                        LA Area: 11.81 cm^2  LA/Aorta: 1.25  LA Volume/Index: 44.64 ml /25 m^2  Left Ventricle  Diastolic Dimension: 9.43 cm         Systolic Dimension: 1.53 cm  Septum Diastolic: 0.85 cm            Septum Systolic: 6.54 cm  PW Diastolic: 1.5 cm                 PW Systolic: 3.94 cm                                       FS: 19.1 %  LV EDV/LV EDV Index: 87.32 ml/49 m^2 LV ESV/LV ESV Index: 52.68 ml/29 m^2  EF Calculated: 39.7 %                LV Length: 6.12 cm  LVOT Diameter: 2.04 cm  Right Atrium  RA Systolic Pressure: 10 mmHg  Right Ventricle  Diastolic Dimension: 6.31 cm                                    RV Systolic Pressure: 16.86 mmHg Aorta/ Miscellaneous Aorta  Aortic Root: 3.62 cm  LVOT Diameter: 2.04 cm    Xr Chest (2 Vw)  1. No evidence of pneumothorax. Resolution of left pleural effusion. Cardiac silhouette remains enlarged. Left lower lobe hazy opacity may represent atelectasis versus pneumonia or infiltrate. COMPARISON: No prior studies available for comparison. DIAGNOSIS:  Post left thoracentesis. Dr. Castellanos Do to read. COMMENTS: I50.43 Acute on chronic combined systolic and diastolic CHF (congestive heart failure) (HCC) ICD10 TECHNIQUE: XR CHEST (2 VW) FINDINGS: Left lower lobe opacity may represent atelectasis versus pneumonia or infiltrate. Interval resolution of left pleural effusion. No evidence of pneumothorax. Cardiac silhouette is enlarged. Visualized soft tissues, and osseous structures are unremarkable. Cta Chest   1/26/2021  EXAMINATION:  CHEST CTA WITH CONTRAST (PULMONARY EMBOLISM PROTOCOL) CLINICAL HISTORY:   PE   I50.43 Acute on chronic combined systolic and diastolic CHF (congestive heart failure) (HCC) ICD10.  Technique:  Spiral axial CT acquisition of the chest from the thoracic inlet to the upper abdomen following IV contrast.  2-D images were reconstructed in the sagittal and coronal planes. 3-D MIPS images were generated in the coronal and axial planes. Images were reviewed on the PACS workstation. All images including the 3-D MIPS were personally archived. Contrast:  IV administration of 100 ml Isovue 300 All CT scans at this facility use dose modulation, iterative reconstruction, and/or weight based dosing when appropriate to reduce radiation dose to as low as reasonably achievable. Comparison:  CTA chest 3/15/2019  RESULT: Limitations: Motion artifact. Evaluation for thromboembolic disease:      - Right heart chambers:  No thromboembolic disease.      - Main pulmonary arteries:  No thromboembolic disease.      - Lobar pulmonary arteries:  No thromboembolic disease.        - Segmental pulmonary arteries:  No thromboembolic disease.        - Subsegmental pulmonary arteries:  Not well visualized due to motion. Lines, tubes, and devices:  None. Lung parenchyma and pleura: Tortuous course of the trachea. Central airways appear grossly patent. Moderate left and small right pleural effusions with associated atelectasis. Limitations in evaluation of the lung parenchyma secondary to motion. Other areas of probable scarring/atelectasis. No pneumothorax. Thoracic inlet, heart, and mediastinum: Visualized thyroid unremarkable. No axillary, mediastinal, or hilar lymphadenopathy. Ascending thoracic aorta aneurysmal, measuring around 5.0 cm, similar to prior. Descending thoracic aorta aneurysmal and measures  up to 4.8 cm, also similar to prior. Normal pulmonary artery size. Cardiomegaly, similar to prior Scattered coronary artery calcifications. Small pericardial effusion. Small hiatal hernia. Bones and soft tissues:  No destructive bone lesion or acute osseous findings. Osteopenia. Scoliosis and kyphosis and multilevel degenerative changes, grossly similar to prior.  Chest wall unremarkable. Upper abdomen:  No acute abnormality in the imaged upper abdomen. Reflux of contrast into the hepatic veins, associated with right heart failure. Lower neck: Unremarkable. No CT evidence of pulmonary embolism. Moderate left and small right pleural effusions. Cardiomegaly and small pericardial effusion. Reflux of contrast into the hepatic veins, associated with right heart failure. 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 No lung consolidative opacity. Areas of probable atelectasis/scarring. Ct Abdomen Pelvis   2/8/2021  EXAMINATION: CT ABDOMEN PELVIS W IV CONTRAST DATE AND TIME:2/8/2021 10:36 AM CLINICAL HISTORY: Acute abdominal pain. Epigastric pain. epigastric pain  COMPARISON: None available. TECHNIQUE: Contiguous axial CT sections of the abdomen and pelvis. 100 cc's of IV contrast given. .  All CT scans at this facility use dose modulation, iterative reconstruction, and/or weight based dosing when appropriate to reduce radiation dose to as low as reasonably achievable. FINDINGS   Liver: Small hypodensities in the right hepatic lobe the largest measuring 8 mm consistent with small cysts. Spleen: Negative    Pancreas: Negative     Kidney: Negative   Adrenal glands are negative. Bowel: Negative    Nodes: No lymphadenopathy. Aorta: Dilated descending thoracic aorta maximum diameter approximately 4 cm adjacent appearance from the CT scans from January 25, 2021. No infrarenal abdominal aortic aneurysm. Peritoneum: No free fluid or free air. The abdominal wall is intact. Pelvis : 9.4 x 6.5 x 6.6 cm cystic mass in the left adnexa. This appears to be a chronic finding this patient was present on an MRI scan that included this area back in April 2016. Bladder catheter in place with decompressed bladder. Bones: No acute osseous abnormalities.      Lung bases: Persistent bibasilar effusions not atelectasis left greater than right. PERSISTENT BIBASILAR PLEURAL-PARENCHYMAL CHANGES. NO ACUTE PATHOLOGY IN THE ABDOMEN OR PELVIS. Xr Chest  : 2/8/2021  Heart size is enlarged. Small bilateral pleural effusions. Bibasilar opacities, left greater than right. No pneumothorax. No acute osseous abnormality. Small bilateral pleural effusions, left greater than right. Bibasilar atelectasis and/or infiltrate, left greater than right. Xr Chest P  2/8/2021  EXAMINATION: XR CHEST PORTABLE REASON FOR EXAM: chest pain FINDINGS: Frontal view of the chest reveals cardiomegaly and prominent central pulmonary vasculature consistent with chronic congestion. Findings similar and unchanged from 2/3/2021. Opacification at the left base consistent with residual pneumonitis/atelectasis remains unchanged from previous study 4 days earlier. PNEUMONIA/ATELECTASIS LEFT BASE. CARDIOMEGALY. MILD CHRONIC CENTRAL VASCULAR CONGESTION. NO SIGNIFICANT CHANGE FROM 2/3/2020        Xr Chest  1/26/2021  EXAMINATION: Portable AP ERECT view of the chest. CLINICAL HISTORY:  SOB , Negative Covid-19 test. DATE: 1/25/2021 5:41 PM COMPARISONS: 7/24/2017 FINDINGS: The heart is moderately enlarged, similar to prior exam.  Prominent interstitial markings, consistent with increasing Central vascular congestion. The costophrenic angles are blunted secondary to pleural effusions bilaterally, left greater than  right. No pneumothorax. There are infiltrates/atelectasis within lung bases, left greater than right. There are moderate degenerative changes in spine. CARDIAC ENLARGEMENT WITH CENTRAL VESSEL CONGESTION AND BILATERAL PLEURAL EFFUSIONS, POSSIBLE CONGESTIVE HEART FAILURE. BIBASILAR INFILTRATES/ATELECTASIS, LEFT GREATER THAN RIGHT. Ir Guided Thoracentesis Pleural    Technically successful ultrasound-guided thoracentesis without immediate complications. HISTORY: Kimberlee Gagnon is a Female of 80 years age.  DIAGNOSIS: Left 02/09/2021     Lab Results   Component Value Date    VITD25 56.0 10/02/2020     Lab Results   Component Value Date    COLORU Yellow 02/07/2021    NITRU Negative 02/07/2021    GLUCOSEU Negative 02/07/2021    KETUA Negative 02/07/2021    UROBILINOGEN 0.2 02/07/2021    BILIRUBINUR Negative 02/07/2021     Lab Results   Component Value Date    PROTIME 15.0 01/25/2021     Lab Results   Component Value Date    INR 1.2 01/25/2021         I discussed results with patient. Current Rehabilitation Assessments:    Rehabilitation:  Physical therapy: FIMS:  BedMobility:      Transfers:  ,  ,      FIMS: ,  ,        Occupational therapy: FIMS:   ,  ,           OCCUPATIONAL THERAPY                     LTG:                 Speech therapy: FIMS:          Past Medical History:          Diagnosis Date    Ascending aortic aneurysm ([facility] [address] 6/23/2017    Charcot Arleen Tooth muscular atrophy     Depression     Descending aortic aneurysm ([facility] [address] 6/23/2017    Essential hypertension 2/16/2018    Headache     HTN (hypertension)     Impaired mobility and activities of daily living     Lumbar stenosis with neurogenic claudication     Myelopathy ([address]     Osteoarthritis          PastSurgical History:          Procedure Laterality Date    JOINT REPLACEMENT      THORACENTESIS Left 01/29/2021    total of 755 cc removed per Dr Castellanos Do specimen sent to lab         Allergies:      Allergies   Allergen Reactions    Latex     Tape Rushie Balm Tape]         CurrentMedications:     Current Facility-Administered Medications: citalopram (CELEXA) tablet 20 mg, 20 mg, Oral, Daily  levothyroxine (SYNTHROID) tablet 88 mcg, 88 mcg, Oral, Daily  aspirin chewable tablet 81 mg, 81 mg, Oral, Daily  vitamin B-12 (CYANOCOBALAMIN) tablet 1,000 mcg, 1,000 mcg, Oral, Daily  dilTIAZem (CARDIZEM CD) extended release capsule 120 mg, 120 mg, Oral, BID  rivaroxaban (XARELTO) tablet 15 mg, 15 mg, Oral, Daily  sodium chloride flush 0.9 % injection 10 mL, 10 mL, Intravenous, 2 times per day  sodium chloride flush 0.9 % injection 10 mL, 10 mL, Intravenous, PRN  promethazine (PHENERGAN) tablet 12.5 mg, 12.5 mg, Oral, Q6H PRN **OR** ondansetron (ZOFRAN) injection 4 mg, 4 mg, Intravenous, Q6H PRN  polyethylene glycol (GLYCOLAX) packet 17 g, 17 g, Oral, Daily PRN  acetaminophen (TYLENOL) tablet 650 mg, 650 mg, Oral, Q6H PRN **OR** acetaminophen (TYLENOL) suppository 650 mg, 650 mg, Rectal, Q6H PRN  furosemide (LASIX) injection 20 mg, 20 mg, Intravenous, BID      Social History:    Social History     Socioeconomic History    Marital status:      Spouse name: Not on file    Number of children: Not on file    Years of education: Not on file    Highest education level: Not on file   Occupational History    Not on file   Social Needs    Financial resource strain: Not on file    Food insecurity     Worry: Not on file     Inability: Not on file    Transportation needs     Medical: Not on file     Non-medical: Not on file   Tobacco Use    Smoking status: Never Smoker    Smokeless tobacco: Never Used   Substance and Sexual Activity    Alcohol use: No     Alcohol/week: 0.0 standard drinks    Drug use: No    Sexual activity: Not on file   Lifestyle    Physical activity     Days per week: 0 days     Minutes per session: 0 min    Stress: Only a little   Relationships    Social connections     Talks on phone: More than three times a week     Gets together: More than three times a week     Attends [employer] service: 1 to 4 times per year     Active member of club or organization: No     Attends meetings of clubs or organizations: Never     Relationship status:      Intimate partner violence     Fear of current or ex partner: No     Emotionally abused: No     Physically abused: No     Forced sexual activity: No   Other Topics Concern    Not on file   Social History Narrative    Lives With: Alone, son lives down the street, dtr is in the area    Has a son in the area    Type of Home: [facility]  in [address]: One level    Home Access: Stairs to enter with rails- Number of Steps: 2- Rails: Both    Bathroom Shower/Tub: Tub/Shower unit, Bathroom Equipment: Grab bars in shower, Shower chair    Home Equipment: Rolling walker, Cane(Pt infrequemtly uses DME for ambulation and prefers to furniture walk in home)    ADL Assistance: [address], [address]: Independent    Homemaking Responsibilities: Yes    Ambulation Assistance: Independent, Transfer Assistance: Independent    Additional Comments: Son stops over 2 times daily              Family History:         Problem Relation Age of Onset    Arthritis Mother     Arthritis Father     High Blood Pressure Father        Review of Systems:    Review of Systems   Constitutional: Positive for activity change, fatigue and unexpected weight change. Negative for chills, diaphoresis and fever. HENT: Positive for congestion. Negative for ear discharge, ear pain, hearing loss, nosebleeds, sore throat and tinnitus. Eyes: Negative for photophobia, pain and redness. Respiratory: Positive for shortness of breath and wheezing. Negative for cough and stridor. Shortness of breath on exertion   Cardiovascular: Negative for chest pain, palpitations and leg swelling. Gastrointestinal: Positive for constipation. Negative for abdominal pain, blood in stool, diarrhea, nausea and vomiting. Endocrine: Positive for polyuria. Negative for polydipsia. Genitourinary: Negative for dysuria, flank pain, frequency, hematuria and urgency. Musculoskeletal: Positive for back pain, gait problem and myalgias. Negative for neck pain. Skin: Positive for wound. Negative for rash. Allergic/Immunologic: Positive for immunocompromised state. Negative for environmental allergies. Neurological: Positive for weakness and light-headedness. Negative for dizziness, tremors, seizures and headaches.    Hematological: reflexes are 1+ on the right side and 1+ on the left side. Brachioradialis reflexes are 1+ on the right side and 1+ on the left side. Patellar reflexes are 0 on the right side and 0 on the left side. Achilles reflexes are 0 on the right side and 0 on the left side. Psychiatric:         Attention and Perception: She is attentive. Mood and Affect: Mood is not anxious or depressed. Affect is not angry. Speech: Speech is delayed. Speech is not rapid and pressured. Behavior: Behavior is slowed. Behavior is not withdrawn. Thought Content: Thought content normal.         Cognition and Memory: Memory is not impaired. She exhibits impaired recent memory. She does not exhibit impaired remote memory. Judgment: Judgment is not impulsive or inappropriate. Ortho Exam  Neurologic Exam     Cranial Nerves     CN III, IV, VI   Pupils are equal, round, and reactive to light.     Gait, Coordination, and Reflexes     Reflexes   Right brachioradialis: 1+  Left brachioradialis: 1+  Right biceps: 1+  Left biceps: 1+  Right triceps: 1+  Left triceps: 1+  Right patellar: 0  Left patellar: 0  Right achilles: 0  Left achilles: 0            Diagnostics:    Recent Results (from the past 24 hour(s))   COVID-19    Collection Time: 02/09/21  3:33 PM   Result Value Ref Range    SARS-CoV-2, NAAT Not Detected Not Detected   EKG 12 Lead    Collection Time: 02/10/21 12:51 AM   Result Value Ref Range    Ventricular Rate 70 BPM    Atrial Rate 70 BPM    P-R Interval 204 ms    QRS Duration 150 ms    Q-T Interval 466 ms    QTc Calculation (Bazett) 503 ms    P Axis 59 degrees    R Axis -61 degrees    T Axis -59 degrees   Basic Metabolic Panel    Collection Time: 02/10/21  5:47 AM   Result Value Ref Range    Sodium 137 135 - 144 mEq/L    Potassium 4.0 3.4 - 4.9 mEq/L    Chloride 99 95 - 107 mEq/L    CO2 27 20 - 31 mEq/L    Anion Gap 11 9 - 15 mEq/L    Glucose 134 (H) 70 - 99 mg/dL    BUN 15 8 - 23 mg/dL    CREATININE 0.81 0.50 - 0.90 mg/dL    GFR Non-African American >60.0 >60    GFR  >60.0 >60    Calcium 8.5 8.5 - 9.9 mg/dL   Magnesium    Collection Time: 02/10/21  5:47 AM   Result Value Ref Range    Magnesium 1.7 1.7 - 2.4 mg/dL   Lipid panel - fasting    Collection Time: 02/10/21  5:47 AM   Result Value Ref Range    Cholesterol, Total 123 0 - 199 mg/dL    Triglycerides 78 0 - 150 mg/dL    HDL 39 (L) 40 - 59 mg/dL    LDL Calculated 68 0 - 129 mg/dL   COVID-19    Collection Time: 02/10/21 11:32 AM   Result Value Ref Range    SARS-CoV-2, NAAT Not Detected Not Detected              Impression:    1. Impaired mobility and ADLs due to acute onset CHF. 2. Fatigue and Malaise      Complex Active General Medical Issues thatcomplicate care Assess & Plan:     1. Active Problems:    Impaired mobility and activities of daily living due to CHF ex, [facility] admit 2021    Paroxysmal atrial fibrillation (HCC)    Lumbar stenosis with neurogenic claudication    Osteoarthritis of lumbar spine with myelopathy    Acute on chronic combined systolic and diastolic CHF (congestive heart failure) (HCC)    Hypoxia    Acute on chronic combined systolic (congestive) and diastolic (congestive) heart failure ([facility] [address]  Resolved Problems:    * No resolved hospital problems. *            Recommendations:    1. Considering all of the factors aboveincluding the patient's current medical status, social status/home envirnment, their functional needs and their ability to participate in a therapy program, I feel that they would best be [facility]  [facility]. It is my opinion that they will be able to tolerate and benefit from 3 hours of therapy a day. I reviewed the varous local options re these levels of care with the patient and family. 2. Nursing care to focus on bowel and bladder issues. 3. Vitamin B12 shot times one  4.  Improve hydration and Nutrition by adding Proteinex and push PO fluids       It was my pleasure to evaluate Amanda Mohr today. Please call [phone] with questions.     Carolina Lou, DO Detail Level: Detailed

## 2022-02-11 DIAGNOSIS — I10 ESSENTIAL HYPERTENSION: ICD-10-CM

## 2022-02-11 RX ORDER — PANTOPRAZOLE SODIUM 40 MG/1
TABLET, DELAYED RELEASE ORAL
Qty: 90 TABLET | Refills: 3 | Status: SHIPPED | OUTPATIENT
Start: 2022-02-11

## 2022-02-11 NOTE — TELEPHONE ENCOUNTER
Please approve or deny this refill request. The order is pended. Thank you.     LOV 10/1/2021    Next Visit Date:  Future Appointments   Date Time Provider Felecia Corrales   4/1/2022 11:00 AM Jorge Valladares, DO [address] [place] [address]

## 2022-02-17 DIAGNOSIS — I10 ESSENTIAL HYPERTENSION: ICD-10-CM

## 2022-02-17 RX ORDER — AMLODIPINE BESYLATE 5 MG/1
5 TABLET ORAL DAILY
Qty: 30 TABLET | Refills: 5 | Status: CANCELLED | OUTPATIENT
Start: 2022-02-17

## 2022-02-17 NOTE — TELEPHONE ENCOUNTER
Please approve or deny this refill request. The order is pended. Thank you.     LOV 10/1/2021    Next Visit Date:  Future Appointments   Date Time Provider Felecia Corrales   4/1/2022 11:00 AM Jorge Draper [facility]

## 2022-02-22 DIAGNOSIS — I10 ESSENTIAL HYPERTENSION: ICD-10-CM

## 2022-02-22 RX ORDER — AMLODIPINE BESYLATE 5 MG/1
5 TABLET ORAL DAILY
Qty: 30 TABLET | Refills: 5 | Status: SHIPPED | OUTPATIENT
Start: 2022-02-22 | End: 2022-04-01 | Stop reason: SDUPTHER

## 2022-02-22 NOTE — TELEPHONE ENCOUNTER
Please approve or deny this refill request. The order is pended. Thank you.     LOV 10/1/2021    Next Visit Date:  Future Appointments   Date Time Provider Felecia Corrales   4/1/2022 11:00 AM Wes Caryle Osler,  [facility]         Verbal order received with read back

## 2022-02-24 NOTE — PROGRESS NOTES
Hospitalist Progress Note      PCP: Ching Peña MD    Date of Admission: 2/9/2021    Chief Complaint:    No chief complaint on file. Subjective:  Patient denies CP, breathing is much better she states, denies fevers. 12 point ROS negative other than mentioned above     Medications:  Reviewed    Infusion Medications     Scheduled Medications    digoxin  125 mcg Oral Daily    midodrine  5 mg Oral TID WC    furosemide  20 mg Oral Daily    citalopram  20 mg Oral Daily    levothyroxine  88 mcg Oral Daily    aspirin  81 mg Oral Daily    vitamin B-12  1,000 mcg Oral Daily    [Held by provider] dilTIAZem  120 mg Oral BID    rivaroxaban  15 mg Oral Daily    budesonide-formoterol  2 puff Inhalation BID    carvedilol  3.125 mg Oral BID    pantoprazole  40 mg Oral BID AC    potassium chloride  20 mEq Oral Daily with breakfast    sodium chloride flush  10 mL Intravenous 2 times per day     PRN Meds: LORazepam, nitroGLYCERIN, simethicone, sodium chloride flush, promethazine **OR** ondansetron, polyethylene glycol, acetaminophen **OR** acetaminophen      Intake/Output Summary (Last 24 hours) at 2/18/2021 1424  Last data filed at 2/18/2021 1238  Gross per 24 hour   Intake 840 ml   Output 1000 ml   Net -160 ml     Exam:    /77   Pulse 71   Temp 98.9 °F (37.2 °C) (Oral)   Resp 18   Ht 5' 3\" (1.6 m)   Wt 158 lb 15.2 oz (72.1 kg)   SpO2 94%   BMI 28.16 kg/m²     General appearance: No apparent distress, appears stated age and cooperative. HEENT:  Conjunctivae/corneas clear. Neck:  Trachea midline. Respiratory:  Normal respiratory effort. Clear to auscultation  Cardiovascular: Irregularly irregular  Abdomen: Soft, non-tender, non-distended with normal bowel sounds. Musculoskeletal: No clubbing, cyanosis or edema bilaterally.   Neuro: Non Focal.   Capillary Refill: Brisk,< 3 seconds   Peripheral Pulses: +2 palpable, equal bilaterally     Labs:   Recent Labs     02/16/21  0453 02/17/21  5008 OV 9/24/21 Verna Feliciano  Hx: Barretts, GERD   EGD: 12/15/21    FYI   Called [facility] and spoke to pharmacist [place], informed by pharmacist pt was taking pantoprazole 20mg BID instead of daily and unable to have refill until 3/5 due to insurance coverage  Called pt and informed per last EGD recs, PPI was decreased to 20mg daily  Pt verbalized understanding and agreeable to PPI change  Informed refill wont be available until 3/5  02/18/21  0602   WBC 8.6 11.0* 11.5*   HGB 10.8* 10.6* 9.7*   HCT 32.9* 32.8* 29.8*    303 286     Recent Labs     02/16/21  0453 02/17/21  0619 02/18/21  0602    137 135   K 4.3 4.8 4.2    101 100   CO2 27 30 27   BUN 20 23 21   CREATININE 0.69 0.84 0.76   CALCIUM 8.8 9.0 8.7     Recent Labs     02/16/21  0452   AST 16   ALT 14   BILIDIR <0.2   BILITOT 0.3   ALKPHOS 48     No results for input(s): INR in the last 72 hours. No results for input(s): Stephanie Kathie in the last 72 hours. Urinalysis:      Lab Results   Component Value Date    NITRU Negative 02/07/2021    WBCUA 3-5 02/07/2021    BACTERIA Negative 02/07/2021    RBCUA 0-2 02/07/2021    BLOODU TRACE 02/07/2021    SPECGRAV 1.019 02/07/2021    GLUCOSEU Negative 02/07/2021       Radiology:  CT CHEST W CONTRAST   Final Result      Moderate left pleural effusion resulting in compressive atelectatic change lingula, left upper, and left lower lobe. Ectasia, ascending and descending thoracic aorta. Cardiomegaly. All CT scans at this facility use dose modulation, iterative reconstruction, and/or weight based dosing when appropriate to reduce radiation dose to as low as reasonably achievable. IR GUIDED THORACENTESIS PLEURAL   Final Result   Technically successful ultrasound-guided thoracentesis without immediate complications. HISTORY: Rafael Harris is a Female of 80 years age. DIAGNOSIS: Left pleural effusion      COMMENTS: I50.43 Acute on chronic combined systolic (congestive) and diastolic (congestive) heart failure (HCC) ICD10         PROCEDURE:   Following the discussion of procedure, risks versus benefits, possible complications, informed consent was obtained. Following universal protocol, patient and site verification was performed with a \"timeout\" prior to the procedure. Brief survey of the patient's left hemithorax demonstrate moderate amount of pleural effusion.    After selection of an appropriate site for thoracentesis, the thoracic skin was prepped and draped in usual sterile fashion. Under ultrasound guidance,    using lidocaine for local anesthesia, a 19-gauge Yueh catheter was inserted in the pleural cavity until effusion was aspirated. Using Vacutainer bottles, 730 mL of effusion was drained. The catheter was removed and the aspiration site dressed. The patient tolerated procedure well. No immediate complications were identified. Subsequent chest radiograph demonstrated no evidence of pneumothorax. The patient left the radiology department in stable condition. Radiology nurse monitored patient's    vital signs throughout the procedure which lasted approximately 30 minutes. XR CHEST PORTABLE   Final Result   1. No evidence of pneumothorax. Near complete resolution of the left pleural effusion. Persistent left lower lobe opacification, may represent pneumonia, though underlying nodule or mass cannot be excluded. There is vascular congestion consistent with    mild pulmonary edema. COMPARISON: February 13, 2021      DIAGNOSIS:  post left thoracentesis- show Dr Jeramie Sampson: I50.43 Acute on chronic combined systolic (congestive) and diastolic (congestive) heart failure ([address] ICD10      TECHNIQUE: XR CHEST PORTABLE      FINDINGS:   Left lower lobe opacity may represent pneumonia though underlying nodule or mass cannot be excluded. No evidence of pneumothorax. Near complete resolution of the left pleural effusion. Increase in vascular congestion, consistent with pulmonary edema. Cardiac silhouette remains enlarged. Visualized soft tissues, and osseous structures are unremarkable. XR CHEST PORTABLE   Final Result   No significant change                  XR CHEST PORTABLE   Final Result   PULMONARY VASCULAR REMAINS MILDLY CONGESTED. COULD SUGGEST COMPONENT OF UNDERLYING CHF.  CORRELATE CLINICALLY   PATCHY TO COALESCENT INFILTRATE WITHIN THE RIGHT LOWER LOBE AS WELL AS AN AREA OF INFILTRATE/CONSOLIDATION LEFT LOWER LOBE WITH TRACE RIGHT PLEURAL EFFUSION AND LEFT PLEURAL EFFUSION. .        XR CHEST (2 VW)    (Results Pending)     Assessment/Plan:    Shock- Resolved; on midodrine   Acute on chronic combined systolic and diastolic CHF- cardiology is managing; pressure is much improved  Exudative Pleural effusion- s/p thoracentesis; thoracic surgery consulted for opinion on VATS with possible pleurodesis  Paroxsymal A fib w RVR- No longer in RVR; blood pressure improved with digoxin  Hypothyroidism- synthroid  Urinary retention - taylor in place; voiding trial in acute rehab    Active Hospital Problems    Diagnosis Date Noted    Paroxysmal atrial fibrillation ([facility] [address] [I48.0]      Priority: High    Impaired mobility and activities of daily living due to CHF [facility] admit 2021 [Z74.09, Z78.9]      Priority: High    Lumbar stenosis with neurogenic claudication [M48.062] 06/02/2016     Priority: Medium    Acute on chronic combined systolic (congestive) and diastolic (congestive) heart failure ([facility] [address] [I50.43] 02/09/2021    Hypoxia [R09.02]     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 I am managing a portion of pt care. Some medical issues are handled by other specialists. Additional work up and treatment should be done in out pt setting by pt PCP and other out pt providers. In addition to examining and evaluating pt, I spent additional time explaining care, normal and abnormal findings, and treatment plan. All of pt questions were answered. Counseling, diet and education were  provided. Case will be discussed with nursing staff when appropriate. Family will be updated if and when appropriate.       Diet: DIET LOW SODIUM 2 GM;    Code Status: Full Code    PT/OT Eval   Electronically signed by Bebeto Dutta Debbie Banerjee MD on 2/18/2021 at 2:24 PM

## 2022-03-30 ENCOUNTER — TELEPHONE (OUTPATIENT)
Dept: CARDIOLOGY CLINIC | Age: 87
End: 2022-03-30

## 2022-03-30 NOTE — TELEPHONE ENCOUNTER
Patient's son called adamant about his mother having a MRI the same day as her appointment 4/1/22.  Advised son that there is no note or order for MRI   - Please advise

## 2022-04-01 ENCOUNTER — OFFICE VISIT (OUTPATIENT)
Dept: CARDIOLOGY CLINIC | Age: 87
End: 2022-04-01
Payer: MEDICARE

## 2022-04-01 VITALS — SYSTOLIC BLOOD PRESSURE: 120 MMHG | DIASTOLIC BLOOD PRESSURE: 70 MMHG | HEART RATE: 66 BPM

## 2022-04-01 DIAGNOSIS — R09.02 HYPOXIA: ICD-10-CM

## 2022-04-01 DIAGNOSIS — I10 ESSENTIAL HYPERTENSION: ICD-10-CM

## 2022-04-01 DIAGNOSIS — I48.0 PAROXYSMAL ATRIAL FIBRILLATION (HCC): Primary | ICD-10-CM

## 2022-04-01 DIAGNOSIS — I50.32 CHRONIC DIASTOLIC CHF (CONGESTIVE HEART FAILURE) (HCC): ICD-10-CM

## 2022-04-01 DIAGNOSIS — R06.02 SHORTNESS OF BREATH: ICD-10-CM

## 2022-04-01 DIAGNOSIS — I71.9 DESCENDING AORTIC ANEURYSM (HCC): ICD-10-CM

## 2022-04-01 DIAGNOSIS — I71.21 ASCENDING AORTIC ANEURYSM: ICD-10-CM

## 2022-04-01 PROCEDURE — 1123F ACP DISCUSS/DSCN MKR DOCD: CPT | Performed by: INTERNAL MEDICINE

## 2022-04-01 PROCEDURE — G8417 CALC BMI ABV UP PARAM F/U: HCPCS | Performed by: INTERNAL MEDICINE

## 2022-04-01 PROCEDURE — 4040F PNEUMOC VAC/ADMIN/RCVD: CPT | Performed by: INTERNAL MEDICINE

## 2022-04-01 PROCEDURE — 1036F TOBACCO NON-USER: CPT | Performed by: INTERNAL MEDICINE

## 2022-04-01 PROCEDURE — 93000 ELECTROCARDIOGRAM COMPLETE: CPT | Performed by: INTERNAL MEDICINE

## 2022-04-01 PROCEDURE — G8427 DOCREV CUR MEDS BY ELIG CLIN: HCPCS | Performed by: INTERNAL MEDICINE

## 2022-04-01 PROCEDURE — 1090F PRES/ABSN URINE INCON ASSESS: CPT | Performed by: INTERNAL MEDICINE

## 2022-04-01 PROCEDURE — 99214 OFFICE O/P EST MOD 30 MIN: CPT | Performed by: INTERNAL MEDICINE

## 2022-04-01 RX ORDER — AMLODIPINE BESYLATE 5 MG/1
5 TABLET ORAL DAILY
Qty: 90 TABLET | Refills: 3 | Status: SHIPPED | OUTPATIENT
Start: 2022-04-01 | End: 2022-08-17

## 2022-04-01 NOTE — PROGRESS NOTES
Chief Complaint   Patient presents with    Atrial Fibrillation    Congestive Heart Failure    Hypertension    6 Month Follow-Up       5-12-16: Patient presents for initial medical evaluation. Patient is followed on a regular basis by Dr. Michelle Lee MD. S/p hospitalization for hip pain. Was seen by us for bradycardia and RBBB. She was asymptomatic at that time. Her cardizem was lowered to 240mg daily. HR is in 70's today. Pt denies chest pain, dyspnea, dyspnea on exertion, change in exercise capacity, fatigue,  nausea, vomiting, diarrhea, constipation, motor weakness, insomnia, weight loss, syncope, dizziness, lightheadedness, palpitations, PND, orthopnea, or claudication                   no hx of MI, CHF or arrhythmia. No hx of LHC. No recent stress test. Does have back and right hip pain. 6-9-16: needs to have back surgery with DR. Tank Harper. S/p ECHO with EF of 60%, mild LVH, grade I DD, mild TR, + IAS aneurysm with ? Left to right PFO, aorta aneurysm noted meauring  4.2-4.5cm. Pt denies chest pain, dyspnea, dyspnea on exertion, change in exercise capacity, fatigue,  nausea, vomiting, diarrhea, constipation, motor weakness, insomnia, weight loss, syncope, dizziness, lightheadedness, palpitations, PND, orthopnea. BP and HR are good. 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 No dissection. Descending aorta measures 3.6cm. Pt denies chest pain, dyspnea, dyspnea on exertion, change in exercise capacity, fatigue,  nausea, vomiting, diarrhea, constipation, motor weakness, insomnia, weight loss, syncope, dizziness, lightheadedness, palpitations, PND, orthopnea, or claudication. Back surgery is tomorrow. 9-27-16: as above, doing well overall.  Pt denies chest pain, dyspnea, dyspnea on exertion, change in exercise capacity, fatigue,  nausea, vomiting, diarrhea, constipation, motor weakness, insomnia, weight loss, syncope, dizziness, lightheadedness, palpitations, PND, orthopnea, or claudication. No hospitalizations. No CHF type symptoms. No falls. 6-23-17: states she has been tired and fatigues. One time her BP was low in 70-80's. Improved with rest and eating. Was having nose bleeds. Her losartan was increased to BID. No pleural effusions. No pneumothoraces. There is no significant periaortic, pretracheal or perihilar adenopathy. Again note is made of aneurysmal dilatation of the ascending arch of the aorta. It measures 4.3 cm, unchanged. Again, note is made    aneurysmal dilatation of descending thoracic aorta. It measures approximately 3.9 cm, unchanged.       Within the field-of-view of the abdomen, note is made of a small to moderate hiatal hernia. 8-12-17: s/p hospitalization for CP and SOB. S/p normal LHC. She did have an SVT episode s/p ablation with dr. Lb Desouza. Her home BP readings are ok. Son states her HR is in the 40's at times. Her cardizem was lowered to one pill a day. Pt denies chest pain, , change in exercise capacity, fatigue,  nausea, vomiting, diarrhea, constipation, motor weakness, insomnia, weight loss, syncope, dizziness, lightheadedness, palpitations, PND, orthopnea. She was placed on [address] [place] [address] Road due to likely Afibb or hx of DVT/PE 5-7 yrs ago per family. S/p CTA of chest with no PE and stable aortic aneurysm measuing 4.3cm. Continues to have SOB. S/p ECHO    Summary   Severe annular calcification.   Mild to moderate (2+) mitral regurgitation is present.   Normal aortic valve structure and function.   Normal tricuspid valve structure and function.   Normal pulmonic valve structure and function.   Normal left atrium.   Left ventricular ejection fraction is visually estimated at 50%.  Impaired relaxation compatible with diastolic dysfunction. ( reversed E/A   ratio)   Mild concentric left ventricular hypertrophy.   Normal right atrium.   dilated RV chamber.  , RVSP of 24mHg.    Normal right ventricle systolic pressure.   No evidence of pericardial effusion.   No evidence of pleural effusion. 2-16-18: Pt denies chest pain, dyspnea, dyspnea on exertion, change in exercise capacity, fatigue,  nausea, vomiting, diarrhea, constipation, motor weakness, insomnia, weight loss, syncope, dizziness, lightheadedness, palpitations, PND, orthopnea, or claudication. No nitro use. BP and hr are good. CAD is stable. No LE discoloration or ulcers. No LE edema. No CHF type symptoms. Lipid profile is normal.    states her Bp is labile. No further SVT episodes. On Eliquis and no bleeding issues. Did have follow up with Pulm and refused HUNTER testing. Airway resistance and airway conductance were both normal.     OVERALL IMPRESSION:  This study shows no significant obstructive  pattern but there was improvement in airflow after bronchodilator  therapy suggesting mild reactive airway disease. There was no  restrictive or diffusion impairment noted on this study. 8-24-18: having labile BP readings a times. Pt denies chest pain, dyspnea, dyspnea on exertion, change in exercise capacity, fatigue,  nausea, vomiting, diarrhea, constipation, motor weakness, insomnia, weight loss, syncope, dizziness, lightheadedness, palpitations, PND, orthopnea, or claudication. No nitro use. BP and hr are good. CAD is stable. No LE discoloration or ulcers. No LE edema. No CHF type symptoms. Lipid profile is normal. No recent hospitalization. No change in meds. Known MR of 2+. On NOAC, no bleeding issues. 2-29-19: on eliquis. Was having nose bleeds and was referred to ENT. Back on Eliquis now. On ASA. Pt denies chest pain, dyspnea, dyspnea on exertion, change in exercise capacity, fatigue,  nausea, vomiting, diarrhea, constipation, motor weakness, insomnia, weight loss, syncope, dizziness, lightheadedness, palpitations, PND, orthopnea, or claudication. No nitro use. BP and hr are good. CAD is stable. No LE discoloration or ulcers. No LE edema. No CHF type symptoms.  Lipid profile is normal. No recent hospitalization. No change in meds. EKG with NSR. Was placed on [address] after developing DVT post ortho surgery. No documented hx of Afibb. 10/2/2020: Patient is doing well overall. She feels good. She is only left the house twice in 6 months due to coronavirus pandemic. Patient with history of a sending aorta aneurysm measuring 4.3 cm. She has a known history of mitral regurgitation 2+. She was taken off of oral anticoagulation after DVT post Ortho surgery. She does not have any documented history of atrial fibrillation. Blood pressure is 116/70 heart rate of 67 bpm.  EKG with normal sinus rhythm, right bundle branch block morphology. Patient with history of cardiac catheterization that showed normal coronary arteries. Positive history of SVT ablation. Status post CT of the chest on March 15, 2019 that showed no change of her a sending aorta aneurysm measuring  4.3 cm by 4.3 cm at the level of the bifurcation of the pulmonary arteries, descending thoracic aorta measures 4.2 x 4.9 cm at the level of the right inferior pulmonary vein. 2/22/2020: Patient is a 80 y. o. female who presents with a chief complaint of SOB.  Patient is followed on a regular basis by [facility], MD. 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   Patient was noted to be in acute decompensated combined heart failure.  She was started on IV diuretics.  She was also developed paroxysmal atrial fibrillation started on oral anticoagulation.  She underwent cardiac catheterization for elevated troponins as well as chest pain and a presentation of acute decompensated heart failure and ejection fraction of 40% was noted to have moderate mid LAD stenosis with negative IFR.  Patient underwent left pleural effusion thoracentesis x2 by interventional radiology. Glenn Salas was seen by pulmonary as well.  She was transferred to rehab previously and developed worsening shortness of breath as well as O2 desaturation and transferred to [facility]. Patient was noted to have urinary retention and a Zeng was placed and urology was consulted. Patient also continues to have left pleural effusion and thoracic surgery is consulted for possible Pleurx catheter. She is currently seen in rehab and doing okay. She denies any chest discomfort. She has low blood pressure and some blood pressure medications are on hold        3/25/2021: Status post hospitalization at [facility] for acute decompensated heart failure/bilateral pleural effusions. Noted to be in paroxysmal atrial fibrillation and started on oral anticoagulation. Patient had a long hospitalization as well as multiple thoracentesis. She required rehab stay. Patient was noted to have urinary retention at that time. She underwent cardiac catheterization with moderate mid LAD stenosis status post negative IFR, ejection fraction of 40%. Patient is on Xarelto. She is on Coreg as well as digoxin. Renal function is normal.  Hemoglobin is stable. 10-1-21: doing ok. On home O2. Hx of CHF. Patient with history of congestive heart failure and pleural effusions with history of thoracentesis. History of paroxysmal atrial fibrillation and she is on oral anticoagulation  She underwent cardiac catheterization with moderate mid LAD stenosis status post negative IFR, ejection fraction of 40%. Patient is on Xarelto. She is on Coreg as well as digoxin. Blood pressure and heart rate are within normal limits today. EKG with normal sinus rhythm nonspecific ST changes    4-1-22: History of CHF, pleural effusions status post thoracentesis.   Patient also with history of paroxysmal atrial fibrillation treatment remains on oral anticoagulation. he underwent cardiac catheterization with moderate mid LAD stenosis status post negative IFR, ejection fraction of 40%. Patient is on Xarelto. Patient with history of thoracic aortic aneurysm measuring 4.4 x 4.5 cm. The descending thoracic aorta measures 3.8 cm. Pt denies chest pain, dyspnea, dyspnea on exertion, change in exercise capacity, fatigue,  nausea, vomiting, diarrhea, constipation, motor weakness, insomnia, weight loss, syncope, dizziness, lightheadedness, palpitations, PND, orthopnea, or claudication.   EKG with normal sinus rhythm nonspecific ST changes normal QTC    Patient Active Problem List   Diagnosis    Lumbar stenosis with neurogenic claudication    Acquired scoliosis    Osteoporosis    Charcot Arleen Tooth muscular atrophy    Excess weight    Osteoarthritis of lumbar spine with myelopathy    Osteoarthritis    Myelopathy (HCC)    Impaired mobility and activities of daily living due to CHF exac, [facility] re-admit 2/19/2021    Headache    Depression    Ascending aortic aneurysm (HCC)    Descending aortic aneurysm (HCC)    Dizziness    Shortness of breath    Essential hypertension    Acute on chronic combined systolic and diastolic CHF (congestive heart failure) (HCC)    Gait abnormality    Paroxysmal atrial fibrillation (HCC)    Pleural effusion    Hypoxia    Acute pulmonary edema (HCC)    Acute on chronic combined systolic (congestive) and diastolic (congestive) heart failure (HCC)    Impaired mobility    Acute cystitis with hematuria    Chronic diastolic CHF (congestive heart failure) (HCC)       Past Surgical History:   Procedure Laterality Date    JOINT REPLACEMENT Bilateral     knees    PLEURAL CATH INSERTION Left 2/25/2021    LEFT PLEURAL CATHETER INSERTION performed by Hanh Dasilva MD at [address] Left 01/29/2021    total of 755 cc removed per Dr Roni Hemphill specimen sent to lab       Social History Socioeconomic History    Marital status:      Spouse name: Not on file    Number of children: Not on file    Years of education: Not on file    Highest education level: Not on file   Occupational History    Not on file   Tobacco Use    Smoking status: Never Smoker    Smokeless tobacco: Never Used   Substance and Sexual Activity    Alcohol use: No     Alcohol/week: 0.0 standard drinks    Drug use: No    Sexual activity: Not on file   Other Topics Concern    Not on file   Social History Narrative    Lives With: Alone, son lives down the street, dtr is in the area    Has a son in the area    Type of Home[place] [address]: One level    Home Access: Stairs to enter with rails- Number of Steps: 2- Rails: Both    Bathroom Shower/Tub: Tub/Shower unit, Bathroom Equipment: Grab bars in shower, Shower chair    Home Equipment: Rolling walker, Cane(Pt infrequemtly uses DME for ambulation and prefers to furniture walk in home)    ADL Assistance: Independent, [address]: Independent    Homemaking Responsibilities: Yes    Ambulation Assistance: Independent, Transfer Assistance: Independent    Additional Comments: Son stops over 2 times daily         Social Determinants of Health     Financial Resource Strain:     Difficulty of Paying Living Expenses: Not on file   Food Insecurity:     Worried About [address] in the Last Year: Not on file    [address] in the Last Year: Not on file   Transportation Needs:     Lack of Transportation (Medical): Not on file    Lack of Transportation (Non-Medical):  Not on file   Physical Activity:     Days of Exercise per Week: Not on file    Minutes of Exercise per Session: Not on file   Stress:     Feeling of Stress : Not on file   Social Connections:     Frequency of Communication with Friends and Family: Not on file    Frequency of Social Gatherings with Friends and Family: Not on file    Attends Congregational Services: Not on file    Active Member of Clubs or Organizations: Not on file    Attends Club or Organization Meetings: Not on file    Marital Status: Not on file   Intimate Partner Violence:     Fear of Current or Ex-Partner: Not on file    Emotionally Abused: Not on file    Physically Abused: Not on file    Sexually Abused: Not on file   Housing Stability:     Unable to Pay for Housing in the Last Year: Not on file    Number of [place] in the Last Year: Not on file    Unstable Housing in the Last Year: Not on file       Family History   Problem Relation Age of Onset    Arthritis Mother     Arthritis Father     High Blood Pressure Father        Current Outpatient Medications   Medication Sig Dispense Refill    amLODIPine (NORVASC) 5 MG tablet Take 1 tablet by mouth daily 90 tablet 3    pantoprazole (PROTONIX) 40 MG tablet TAKE 1 TABLET DAILY 90 tablet 3    carvedilol (COREG) 6.25 MG tablet TAKE 1 TABLET TWICE A  tablet 3    XARELTO 15 MG TABS tablet TAKE 1 TABLET DAILY 90 tablet 3    KLOR-CON M20 20 MEQ extended release tablet TAKE 1 TABLET DAILY WITH BREAKFAST (MUST CALL OFFICE FOR FOLLOW UP) 90 tablet 2    digoxin (LANOXIN) 125 MCG tablet Take 1 tablet by mouth daily 90 tablet 3    furosemide (LASIX) 20 MG tablet Take 1 tablet by mouth daily 90 tablet 3    nitroGLYCERIN (NITROSTAT) 0.4 MG SL tablet up to max of 3 total doses.  If no relief after 1 dose, call 911. 25 tablet 3    budesonide-formoterol (SYMBICORT) 160-4.5 MCG/ACT AERO Inhale 2 puffs into the lungs 2 times daily 1 Inhaler 3    ferrous sulfate (IRON 325) 325 (65 Fe) MG tablet Take 1 tablet by mouth 2 times daily (with meals) 30 tablet 3    simethicone (MYLICON) 80 MG chewable tablet Take 1 tablet by mouth every 6 hours as needed for Flatulence 180 tablet 3    Carboxymethylcellulose Sodium (EYE DROPS OP) Apply 1 drop to eye as needed (Dry eyes) Indications: Systane       Boswell[place]-Glucosamine-Vit D (OSTEO BI-FLEX ONE PER DAY) TABS Take by mouth daily      Calcium Carbonate-Vitamin D (CALTRATE 600+D PO) Take by mouth daily      Multiple Vitamins-Minerals (CENTRUM SILVER ULTRA WOMENS) TABS Take by mouth daily      vitamin B-12 (CYANOCOBALAMIN) 1000 MCG tablet Take 1,000 mcg by mouth daily      aspirin 81 MG tablet Take 81 mg by mouth daily       citalopram (CELEXA) 20 MG tablet Take 20 mg by mouth daily       SYNTHROID 88 MCG tablet Take 88 mcg by mouth daily        No current facility-administered medications for this visit. Latex and Tape [adhesive tape]    Review of Systems:  General ROS: negative  Psychological ROS: negative  Hematological and Lymphatic ROS: No history of blood clots or bleeding disorder. Respiratory ROS: no cough, shortness of breath, or wheezing  Cardiovascular ROS: no chest pain or dyspnea on exertion  Gastrointestinal ROS: no abdominal pain, change in bowel habits, or black or bloody stools  Genito-Urinary ROS: no dysuria, trouble voiding, or hematuria  Musculoskeletal ROS: negative  Neurological ROS: no TIA or stroke symptoms  Dermatological ROS: negative    VITALS:  Blood pressure 120/70, pulse 66. There is no height or weight on file to calculate BMI. Physical Examination:  General appearance - alert, well appearing, and in no distress and overweight  Mental status - alert, oriented to person, place, and time  Neck - Neck is supple, no JVD or carotid bruits. No thyromegaly or adenopathy.    Chest - clear to auscultation, no wheezes, rales or rhonchi, symmetric air entry  Heart - normal rate, regular rhythm, normal S1, S2, no murmurs, rubs, clicks or gallops  Abdomen - soft, nontender, nondistended, no masses or organomegaly  Neurological - alert, oriented, normal speech, no focal findings or movement disorder noted  Extremities - peripheral pulses normal, 1-2+ pedal edema, no clubbing or cyanosis  Skin - normal coloration and turgor, no rashes, no suspicious skin lesions noted        Orders Placed This Encounter   Procedures    CT CHEST W CONTRAST    EKG 12 Lead    ECHO Complete 2D W Doppler W Color       ASSESSMENT:     Diagnosis Orders   1. Paroxysmal atrial fibrillation (HCC)  EKG 12 Lead   2. Essential hypertension  amLODIPine (NORVASC) 5 MG tablet   3. Ascending aortic aneurysm (HCC)  ECHO Complete 2D W Doppler W Color    CT CHEST W CONTRAST   4. Shortness of breath     5. Hypoxia     6. Descending aortic aneurysm ([address]     7. Chronic diastolic CHF (congestive heart failure) ([address]     7. Nonobstructive CAD      PLAN:     Patient will need to continue to follow up with you for their general medical care     As always, aggressive risk factor modification is strongly recommended. We should adhere to the [address] guidelines for HTN management and the NCEP ATP III guidelines for LDL-C management. Cardiac diet is always recommended with low fat, cholesterol, calories and sodium. Continue medications at current doses. CT of chest for asc. Aorta aneurysm     Check echo     Check EKG    Cont with DOAC    Patient was advised and encouraged to check blood pressure at home or at a pharmacy, maintain a logbook, and also call us back if blood pressure are above the target ranges or if it is low. Patient clearly understands and agrees to the instructions. We will need to continue to monitor muscle and liver enzymes, BUN, CR, and electrolytes. Details of medical condition explained and patient was warned about adverse consequences of uncontrolled medical conditions and possible side effects of prescribed medications.

## 2022-04-04 DIAGNOSIS — I10 ESSENTIAL HYPERTENSION: ICD-10-CM

## 2022-04-04 RX ORDER — DIGOXIN 125 MCG
TABLET ORAL
Qty: 90 TABLET | Refills: 3 | Status: SHIPPED | OUTPATIENT
Start: 2022-04-04 | End: 2022-08-17

## 2022-04-04 RX ORDER — FUROSEMIDE 20 MG/1
TABLET ORAL
Qty: 90 TABLET | Refills: 3 | Status: SHIPPED | OUTPATIENT
Start: 2022-04-04

## 2022-04-04 NOTE — TELEPHONE ENCOUNTER
Please approve or deny this refill request. The order is pended. Thank you.     LOV 4/1/2022    Next Visit Date:  Future Appointments   Date Time Provider Felecia Corrales   4/28/2022 10:00 AM B[facility] CT ROOM 1 [facility] CT [facility] Fac RAD   4/28/2022 11:15 AM [facility] ECHO  S. Susy   10/7/2022 11:00 AM Jorge Duggan, [facility]

## 2022-04-21 DIAGNOSIS — I10 ESSENTIAL HYPERTENSION: ICD-10-CM

## 2022-04-21 RX ORDER — POTASSIUM CHLORIDE 20 MEQ/1
TABLET, EXTENDED RELEASE ORAL
Qty: 90 TABLET | Refills: 2 | Status: SHIPPED | OUTPATIENT
Start: 2022-04-21 | End: 2022-07-18

## 2022-04-21 NOTE — TELEPHONE ENCOUNTER
Please approve or deny this refill request. The order is pended. Thank you.     [facility] Visit date not found    Next Visit Date:  Future Appointments   Date Time Provider Felecia Corrales   4/28/2022 10:00 AM [facility] CT ROOM 1 [facility] CT [facility] Fac RAD   4/28/2022 11:15 AM [facility] ECHO  S. Susy   10/7/2022 11:00 AM Jorge Dominguez, [facility]         Verbal order received with read back

## 2022-05-02 ENCOUNTER — TELEPHONE (OUTPATIENT)
Dept: CARDIOLOGY CLINIC | Age: 87
End: 2022-05-02

## 2022-05-02 NOTE — TELEPHONE ENCOUNTER
Patient son chacho calling regarding mom. Patient tested positive for covid, they gave her corcidin for colds but now her heart rate dropped to 48. .. no dizziness. Patient does feel weak. .. Please advise    Is there something else she can take?

## 2022-06-22 ENCOUNTER — HOSPITAL ENCOUNTER (OUTPATIENT)
Dept: NON INVASIVE DIAGNOSTICS | Age: 87
Discharge: HOME | End: 2022-06-22
Payer: MEDICARE

## 2022-06-22 ENCOUNTER — TELEPHONE (OUTPATIENT)
Dept: CARDIOLOGY CLINIC | Age: 87
End: 2022-06-22

## 2022-06-22 ENCOUNTER — HOSPITAL ENCOUNTER (OUTPATIENT)
Dept: CT IMAGING | Age: 87
Discharge: HOME | End: 2022-06-24
Payer: MEDICARE

## 2022-06-22 DIAGNOSIS — I71.21 ASCENDING AORTIC ANEURYSM: ICD-10-CM

## 2022-06-22 LAB
LV EF: 60 %
LVEF MODALITY: NORMAL

## 2022-06-22 PROCEDURE — 93306 TTE W/DOPPLER COMPLETE: CPT

## 2022-06-22 PROCEDURE — 6360000004 HC RX CONTRAST MEDICATION: Performed by: INTERNAL MEDICINE

## 2022-06-22 PROCEDURE — 71260 CT THORAX DX C+: CPT

## 2022-06-22 RX ORDER — SODIUM CHLORIDE 0.9 % (FLUSH) 0.9 %
10 SYRINGE (ML) INJECTION
Status: DISPENSED | OUTPATIENT
Start: 2022-06-22 | End: 2022-06-22

## 2022-06-22 RX ADMIN — IOPAMIDOL 50 ML: 612 INJECTION, SOLUTION INTRAVENOUS at 10:28

## 2022-06-22 NOTE — TELEPHONE ENCOUNTER
----- Message from Viky Gray MA sent at 4/1/2022 11:25 AM EDT -----  Per DR Bullock Been notify of Echo and chest ct

## 2022-06-27 NOTE — TELEPHONE ENCOUNTER
Ascending thoracic aortic aneurysm measuring 4.6 x 4.9 cm, slightly larger than previous. Still no indication for surgery however. Will repeat in one year. Please notify son. Thanks.

## 2022-07-13 DIAGNOSIS — I10 ESSENTIAL HYPERTENSION: ICD-10-CM

## 2022-07-18 ENCOUNTER — HOSPITAL ENCOUNTER (INPATIENT)
Age: 87
LOS: 4 days | Discharge: NURSING, REHAB OR LONG-TERM CARE | DRG: 372 | End: 2022-07-22
Attending: INTERNAL MEDICINE | Admitting: INTERNAL MEDICINE
Payer: MEDICARE

## 2022-07-18 ENCOUNTER — APPOINTMENT (OUTPATIENT)
Dept: CT IMAGING | Age: 87
DRG: 372 | End: 2022-07-18
Payer: MEDICARE

## 2022-07-18 DIAGNOSIS — R10.9 RIGHT SIDED ABDOMINAL PAIN: ICD-10-CM

## 2022-07-18 DIAGNOSIS — R19.00 ABDOMINAL MASS, UNSPECIFIED ABDOMINAL LOCATION: Primary | ICD-10-CM

## 2022-07-18 PROBLEM — E03.9 HYPOTHYROID: Status: ACTIVE | Noted: 2022-01-01

## 2022-07-18 PROBLEM — D64.9 ANEMIA: Status: ACTIVE | Noted: 2022-07-18

## 2022-07-18 PROBLEM — I50.9 CHF (CONGESTIVE HEART FAILURE) (HCC): Status: ACTIVE | Noted: 2022-07-18

## 2022-07-18 PROBLEM — E87.6 HYPOKALEMIA: Status: ACTIVE | Noted: 2022-01-01

## 2022-07-18 PROBLEM — E87.1 HYPONATREMIA: Status: ACTIVE | Noted: 2022-07-18

## 2022-07-18 LAB
ALBUMIN SERPL-MCNC: 3 G/DL (ref 3.5–4.6)
ALP BLD-CCNC: 66 U/L (ref 40–130)
ALT SERPL-CCNC: 29 U/L (ref 0–33)
ANION GAP SERPL CALCULATED.3IONS-SCNC: 9 MEQ/L (ref 9–15)
AST SERPL-CCNC: 27 U/L (ref 0–35)
BASOPHILS ABSOLUTE: 0 K/UL (ref 0–0.2)
BASOPHILS RELATIVE PERCENT: 0.2 %
BILIRUB SERPL-MCNC: 0.5 MG/DL (ref 0.2–0.7)
BUN BLDV-MCNC: 12 MG/DL (ref 8–23)
CALCIUM SERPL-MCNC: 8.8 MG/DL (ref 8.5–9.9)
CHLORIDE BLD-SCNC: 95 MEQ/L (ref 95–107)
CO2: 26 MEQ/L (ref 20–31)
CREAT SERPL-MCNC: 0.57 MG/DL (ref 0.5–0.9)
EOSINOPHILS ABSOLUTE: 0.1 K/UL (ref 0–0.7)
EOSINOPHILS RELATIVE PERCENT: 0.4 %
GFR AFRICAN AMERICAN: >60
GFR NON-AFRICAN AMERICAN: >60
GLOBULIN: 3.3 G/DL (ref 2.3–3.5)
GLUCOSE BLD-MCNC: 170 MG/DL (ref 70–99)
HCT VFR BLD CALC: 31.4 % (ref 37–47)
HEMOGLOBIN: 10.1 G/DL (ref 12–16)
LACTIC ACID: 1.2 MMOL/L (ref 0.5–2.2)
LIPASE: 14 U/L (ref 12–95)
LYMPHOCYTES ABSOLUTE: 0.6 K/UL (ref 1–4.8)
LYMPHOCYTES RELATIVE PERCENT: 3.8 %
MCH RBC QN AUTO: 27.2 PG (ref 27–31.3)
MCHC RBC AUTO-ENTMCNC: 32.3 % (ref 33–37)
MCV RBC AUTO: 84.2 FL (ref 82–100)
MONOCYTES ABSOLUTE: 1.1 K/UL (ref 0.2–0.8)
MONOCYTES RELATIVE PERCENT: 7 %
NEUTROPHILS ABSOLUTE: 13.3 K/UL (ref 1.4–6.5)
NEUTROPHILS RELATIVE PERCENT: 88.6 %
PDW BLD-RTO: 14.1 % (ref 11.5–14.5)
PLATELET # BLD: 332 K/UL (ref 130–400)
POC CREATININE WHOLE BLOOD: 0.5
POTASSIUM SERPL-SCNC: 3.1 MEQ/L (ref 3.4–4.9)
RBC # BLD: 3.73 M/UL (ref 4.2–5.4)
SODIUM BLD-SCNC: 130 MEQ/L (ref 135–144)
TOTAL CK: 52 U/L (ref 0–170)
TOTAL PROTEIN: 6.3 G/DL (ref 6.3–8)
TROPONIN: <0.01 NG/ML (ref 0–0.01)
WBC # BLD: 15 K/UL (ref 4.8–10.8)

## 2022-07-18 PROCEDURE — 85025 COMPLETE CBC W/AUTO DIFF WBC: CPT

## 2022-07-18 PROCEDURE — 87040 BLOOD CULTURE FOR BACTERIA: CPT

## 2022-07-18 PROCEDURE — 2580000003 HC RX 258

## 2022-07-18 PROCEDURE — 82550 ASSAY OF CK (CPK): CPT

## 2022-07-18 PROCEDURE — 2500000003 HC RX 250 WO HCPCS

## 2022-07-18 PROCEDURE — 74177 CT ABD & PELVIS W/CONTRAST: CPT

## 2022-07-18 PROCEDURE — 83735 ASSAY OF MAGNESIUM: CPT

## 2022-07-18 PROCEDURE — 80053 COMPREHEN METABOLIC PANEL: CPT

## 2022-07-18 PROCEDURE — 83605 ASSAY OF LACTIC ACID: CPT

## 2022-07-18 PROCEDURE — 70450 CT HEAD/BRAIN W/O DYE: CPT

## 2022-07-18 PROCEDURE — 2580000003 HC RX 258: Performed by: PHYSICIAN ASSISTANT

## 2022-07-18 PROCEDURE — 74176 CT ABD & PELVIS W/O CONTRAST: CPT

## 2022-07-18 PROCEDURE — 6360000002 HC RX W HCPCS

## 2022-07-18 PROCEDURE — 81001 URINALYSIS AUTO W/SCOPE: CPT

## 2022-07-18 PROCEDURE — 99285 EMERGENCY DEPT VISIT HI MDM: CPT

## 2022-07-18 PROCEDURE — 1210000000 HC MED SURG R&B

## 2022-07-18 PROCEDURE — 84484 ASSAY OF TROPONIN QUANT: CPT

## 2022-07-18 PROCEDURE — 87086 URINE CULTURE/COLONY COUNT: CPT

## 2022-07-18 PROCEDURE — 83690 ASSAY OF LIPASE: CPT

## 2022-07-18 PROCEDURE — 96365 THER/PROPH/DIAG IV INF INIT: CPT

## 2022-07-18 PROCEDURE — 93005 ELECTROCARDIOGRAM TRACING: CPT | Performed by: PHYSICIAN ASSISTANT

## 2022-07-18 PROCEDURE — 36415 COLL VENOUS BLD VENIPUNCTURE: CPT

## 2022-07-18 PROCEDURE — 6360000002 HC RX W HCPCS: Performed by: PHYSICIAN ASSISTANT

## 2022-07-18 PROCEDURE — 96375 TX/PRO/DX INJ NEW DRUG ADDON: CPT

## 2022-07-18 PROCEDURE — 6360000004 HC RX CONTRAST MEDICATION: Performed by: PHYSICIAN ASSISTANT

## 2022-07-18 RX ORDER — SODIUM CHLORIDE 9 MG/ML
INJECTION, SOLUTION INTRAVENOUS PRN
Status: DISCONTINUED | OUTPATIENT
Start: 2022-07-18 | End: 2022-07-22 | Stop reason: HOSPADM

## 2022-07-18 RX ORDER — ENOXAPARIN SODIUM 100 MG/ML
40 INJECTION SUBCUTANEOUS DAILY
Status: DISCONTINUED | OUTPATIENT
Start: 2022-07-19 | End: 2022-07-18

## 2022-07-18 RX ORDER — ONDANSETRON 2 MG/ML
4 INJECTION INTRAMUSCULAR; INTRAVENOUS EVERY 6 HOURS PRN
Status: DISCONTINUED | OUTPATIENT
Start: 2022-07-18 | End: 2022-07-22 | Stop reason: HOSPADM

## 2022-07-18 RX ORDER — POLYETHYLENE GLYCOL 3350 17 G/17G
17 POWDER, FOR SOLUTION ORAL DAILY PRN
Status: DISCONTINUED | OUTPATIENT
Start: 2022-07-18 | End: 2022-07-22 | Stop reason: HOSPADM

## 2022-07-18 RX ORDER — ONDANSETRON 2 MG/ML
4 INJECTION INTRAMUSCULAR; INTRAVENOUS ONCE
Status: DISCONTINUED | OUTPATIENT
Start: 2022-07-18 | End: 2022-07-22 | Stop reason: HOSPADM

## 2022-07-18 RX ORDER — MORPHINE SULFATE 4 MG/ML
4 INJECTION, SOLUTION INTRAMUSCULAR; INTRAVENOUS ONCE
Status: DISCONTINUED | OUTPATIENT
Start: 2022-07-18 | End: 2022-07-22 | Stop reason: HOSPADM

## 2022-07-18 RX ORDER — ONDANSETRON 2 MG/ML
4 INJECTION INTRAMUSCULAR; INTRAVENOUS ONCE
Status: COMPLETED | OUTPATIENT
Start: 2022-07-18 | End: 2022-07-18

## 2022-07-18 RX ORDER — KETOROLAC TROMETHAMINE 15 MG/ML
15 INJECTION, SOLUTION INTRAMUSCULAR; INTRAVENOUS EVERY 6 HOURS PRN
Status: DISCONTINUED | OUTPATIENT
Start: 2022-07-18 | End: 2022-07-22 | Stop reason: HOSPADM

## 2022-07-18 RX ORDER — SODIUM CHLORIDE 9 MG/ML
INJECTION, SOLUTION INTRAVENOUS CONTINUOUS
Status: DISPENSED | OUTPATIENT
Start: 2022-07-18 | End: 2022-07-19

## 2022-07-18 RX ORDER — ACETAMINOPHEN 650 MG/1
650 SUPPOSITORY RECTAL EVERY 6 HOURS PRN
Status: DISCONTINUED | OUTPATIENT
Start: 2022-07-18 | End: 2022-07-22 | Stop reason: HOSPADM

## 2022-07-18 RX ORDER — ONDANSETRON 4 MG/1
4 TABLET, ORALLY DISINTEGRATING ORAL EVERY 8 HOURS PRN
Status: DISCONTINUED | OUTPATIENT
Start: 2022-07-18 | End: 2022-07-22 | Stop reason: HOSPADM

## 2022-07-18 RX ORDER — SODIUM CHLORIDE 0.9 % (FLUSH) 0.9 %
5-40 SYRINGE (ML) INJECTION PRN
Status: DISCONTINUED | OUTPATIENT
Start: 2022-07-18 | End: 2022-07-22 | Stop reason: HOSPADM

## 2022-07-18 RX ORDER — SODIUM CHLORIDE 0.9 % (FLUSH) 0.9 %
5-40 SYRINGE (ML) INJECTION EVERY 12 HOURS SCHEDULED
Status: DISCONTINUED | OUTPATIENT
Start: 2022-07-18 | End: 2022-07-22 | Stop reason: HOSPADM

## 2022-07-18 RX ORDER — ACETAMINOPHEN 325 MG/1
650 TABLET ORAL EVERY 6 HOURS PRN
Status: DISCONTINUED | OUTPATIENT
Start: 2022-07-18 | End: 2022-07-22 | Stop reason: HOSPADM

## 2022-07-18 RX ORDER — POTASSIUM CHLORIDE 20 MEQ/1
TABLET, EXTENDED RELEASE ORAL
Qty: 90 TABLET | Refills: 3 | Status: ON HOLD | OUTPATIENT
Start: 2022-07-18 | End: 2022-07-19

## 2022-07-18 RX ORDER — SODIUM CHLORIDE 9 MG/ML
INJECTION, SOLUTION INTRAVENOUS CONTINUOUS
Status: DISCONTINUED | OUTPATIENT
Start: 2022-07-18 | End: 2022-07-18

## 2022-07-18 RX ORDER — MORPHINE SULFATE 4 MG/ML
4 INJECTION, SOLUTION INTRAMUSCULAR; INTRAVENOUS ONCE
Status: COMPLETED | OUTPATIENT
Start: 2022-07-18 | End: 2022-07-18

## 2022-07-18 RX ADMIN — SODIUM CHLORIDE: 9 INJECTION, SOLUTION INTRAVENOUS at 16:58

## 2022-07-18 RX ADMIN — Medication 10 ML: at 22:59

## 2022-07-18 RX ADMIN — BARIUM SULFATE 450 ML: 20 SUSPENSION ORAL at 15:39

## 2022-07-18 RX ADMIN — KETOROLAC TROMETHAMINE 15 MG: 15 INJECTION, SOLUTION INTRAMUSCULAR; INTRAVENOUS at 21:58

## 2022-07-18 RX ADMIN — PIPERACILLIN AND TAZOBACTAM 3375 MG: 3; .375 INJECTION, POWDER, LYOPHILIZED, FOR SOLUTION INTRAVENOUS at 15:42

## 2022-07-18 RX ADMIN — MORPHINE SULFATE 4 MG: 4 INJECTION, SOLUTION INTRAMUSCULAR; INTRAVENOUS at 16:10

## 2022-07-18 RX ADMIN — SODIUM CHLORIDE: 9 INJECTION, SOLUTION INTRAVENOUS at 23:23

## 2022-07-18 RX ADMIN — IOPAMIDOL 50 ML: 612 INJECTION, SOLUTION INTRAVENOUS at 14:47

## 2022-07-18 RX ADMIN — ONDANSETRON 4 MG: 2 INJECTION INTRAMUSCULAR; INTRAVENOUS at 16:10

## 2022-07-18 ASSESSMENT — ENCOUNTER SYMPTOMS
TROUBLE SWALLOWING: 0
PHOTOPHOBIA: 0
DIARRHEA: 0
SHORTNESS OF BREATH: 0
CONSTIPATION: 0
COUGH: 0
CHEST TIGHTNESS: 0
ABDOMINAL PAIN: 1
DIARRHEA: 1
BACK PAIN: 0
NAUSEA: 0
ABDOMINAL DISTENTION: 0
SORE THROAT: 0
VOMITING: 0
EYE DISCHARGE: 0
COLOR CHANGE: 0
RHINORRHEA: 0

## 2022-07-18 ASSESSMENT — PAIN DESCRIPTION - LOCATION
LOCATION: ABDOMEN
LOCATION: ABDOMEN

## 2022-07-18 ASSESSMENT — PAIN SCALES - GENERAL
PAINLEVEL_OUTOF10: 8
PAINLEVEL_OUTOF10: 7
PAINLEVEL_OUTOF10: 3

## 2022-07-18 ASSESSMENT — PAIN DESCRIPTION - ONSET: ONSET: ON-GOING

## 2022-07-18 ASSESSMENT — PAIN DESCRIPTION - ORIENTATION
ORIENTATION: RIGHT;LOWER
ORIENTATION: RIGHT

## 2022-07-18 ASSESSMENT — PAIN DESCRIPTION - PAIN TYPE: TYPE: ACUTE PAIN

## 2022-07-18 ASSESSMENT — PAIN DESCRIPTION - DESCRIPTORS: DESCRIPTORS: SHARP

## 2022-07-18 ASSESSMENT — PAIN - FUNCTIONAL ASSESSMENT: PAIN_FUNCTIONAL_ASSESSMENT: 0-10

## 2022-07-18 ASSESSMENT — PAIN DESCRIPTION - FREQUENCY: FREQUENCY: CONTINUOUS

## 2022-07-18 NOTE — ED NOTES
Pt AOx4, resting in ED cot, no s/s of distress noted, resp even and unlabored, on bedside monitor, no concerns voiced.      Olga Cervantes RN  07/18/22 [id number]

## 2022-07-18 NOTE — ED PROVIDER NOTES
[address] [facility] ED  eMERGENCY dEPARTMENT eNCOUnter      Pt Name: Harleen Valencia  MRN: [id number]  [place] 7/12/1932  Date of evaluation: 7/18/2022  Provider: Celestino Cantrell Dr       Chief Complaint   Patient presents with    Abdominal Pain         HISTORY OF PRESENT ILLNESS   (Location/Symptom, Timing/Onset,Context/Setting, Quality, Duration, Modifying Factors, Severity)  Note limiting factors. Harleen Valencia is a 80 y.o. female who presents to the emergency department complaint of abdominal pain which patient states been ongoing for approximately the last 5 days to 1 week. No nausea vomiting, no fevers. Patient speaks predominately [place], but does speak some [place], family is not present with her on arrival to the ED. Past medical history significant for osteoarthritis, hypertension, CHF, depression, congestive heart failure. No [facility]  available at the time of patient arrival    0 patient's son is now present with her at this time, he also states that she had complained of headache, dizziness, which she states been ongoing for last 2 to 3 days, she does have past history of vertigo and she has been using medications prescribed by her primary provider without a significant relief of the dizziness, she denies any slurred speech, no weakness to arms or legs, no ataxia. HPI    NursingNotes were reviewed. REVIEW OF SYSTEMS    (2-9 systems for level 4, 10 or more for level 5)     Review of Systems   Constitutional:  Negative for activity change and appetite change. HENT:  Negative for congestion, ear discharge, ear pain, nosebleeds, rhinorrhea and sore throat. Eyes:  Negative for discharge. Respiratory:  Negative for shortness of breath. Cardiovascular:  Negative for chest pain, palpitations and leg swelling. Gastrointestinal:  Positive for abdominal pain. Negative for abdominal distention, constipation, diarrhea, nausea and vomiting. mouth daily     DIGOXIN (LANOXIN) 125 MCG TABLET    TAKE 1 TABLET DAILY    FERROUS SULFATE (IRON 325) 325 (65 FE) MG TABLET    Take 1 tablet by mouth 2 times daily (with meals)    FUROSEMIDE (LASIX) 20 MG TABLET    TAKE 1 TABLET DAILY    MULTIPLE VITAMINS-MINERALS (CENTRUM SILVER ULTRA WOMENS) TABS    Take by mouth daily    NITROGLYCERIN (NITROSTAT) 0.4 MG SL TABLET    up to max of 3 total doses. If no relief after 1 dose, call 911. PANTOPRAZOLE (PROTONIX) 40 MG TABLET    TAKE 1 TABLET DAILY    POTASSIUM CHLORIDE (KLOR-CON M20) 20 MEQ EXTENDED RELEASE TABLET    TAKE 1 TABLET DAILY WITH BREAKFAST (MUST CALL OFFICE FOR FOLLOW UP)    SIMETHICONE (MYLICON) 80 MG CHEWABLE TABLET    Take 1 tablet by mouth every 6 hours as needed for Flatulence    SYNTHROID 88 MCG TABLET    Take 88 mcg by mouth daily     VITAMIN B-12 (CYANOCOBALAMIN) 1000 MCG TABLET    Take 1,000 mcg by mouth daily    XARELTO 15 MG TABS TABLET    TAKE 1 TABLET DAILY       ALLERGIES     Latex and Tape [place] Byes tape]    FAMILY HISTORY       Family History   Problem Relation Age of Onset    Arthritis Mother     Arthritis Father     High Blood Pressure Father           SOCIAL HISTORY       Social History     Socioeconomic History    Marital status:     Tobacco Use    Smoking status: Never    Smokeless tobacco: Never   Substance and Sexual Activity    Alcohol use: No     Alcohol/week: 0.0 standard drinks    Drug use: No   Social History Narrative    Lives With: Alone, son lives down the street, dtr is in the area    Has a son in the area    Type of Home: [facility]  in [address]: One level    Home Access: Stairs to enter with rails- Number of Steps: 2- Rails: Both    Bathroom Shower/Tub: Tub/Shower unit, Bathroom Equipment: Grab bars in shower, Shower chair    Home Equipment: Rolling walker, Cane(Pt infrequemtly uses DME for ambulation and prefers to furniture walk in home)    ADL Assistance: [address] [place] [address] Avenue: Independent    Homemaking Responsibilities: Yes    Ambulation Assistance: Independent, Transfer Assistance: Independent    Additional Comments: Son stops over 2 times daily           SCREENINGS    Cody Coma Scale  Eye Opening: Spontaneous  Best Verbal Response: Oriented  Best Motor Response: Obeys commands  Cody Coma Scale Score: 15 @FLOW([id number])@      PHYSICAL EXAM    (up to 7 for level 4, 8 or more for level 5)     ED Triage Vitals [07/18/22 1215]   BP Temp Temp Source Heart Rate Resp SpO2 Height Weight   111/70 98.7 °F (37.1 °C) Oral 91 22 96 % 5' 6\" (1.676 m) 160 lb (72.6 kg)       Physical Exam  Vitals and nursing note reviewed. Constitutional:       General: She is not in acute distress. Appearance: She is well-developed. She is not ill-appearing, toxic-appearing or diaphoretic. HENT:      Head: Normocephalic. Nose: No congestion. Mouth/Throat:      Mouth: Mucous membranes are moist.      Pharynx: No oropharyngeal exudate or posterior oropharyngeal erythema. Eyes:      Extraocular Movements: Extraocular movements intact. Conjunctiva/sclera: Conjunctivae normal.      Pupils: Pupils are equal, round, and reactive to light. Neck:      Vascular: No JVD. Trachea: No tracheal deviation. Cardiovascular:      Rate and Rhythm: Normal rate. Pulses: Normal pulses. Heart sounds: Normal heart sounds. No murmur heard. No friction rub. No gallop. Pulmonary:      Effort: Pulmonary effort is normal. No tachypnea, accessory muscle usage, respiratory distress or retractions. Breath sounds: No stridor. No wheezing, rhonchi or rales. Chest:      Chest wall: No tenderness. Abdominal:      General: Abdomen is flat. Bowel sounds are normal. There is no distension or abdominal bruit. Palpations: There is no shifting dullness, fluid wave, hepatomegaly, splenomegaly, mass or pulsatile mass. Tenderness: There is generalized abdominal tenderness.  There is no right CVA tenderness, left CVA tenderness, guarding or rebound. Negative signs include Asencio's sign, Rovsing's sign and McBurney's sign. Musculoskeletal:         General: No deformity. Cervical back: Normal range of motion and neck supple. No rigidity. Skin:     General: Skin is warm and dry. Capillary Refill: Capillary refill takes less than 2 seconds. Coloration: Skin is not jaundiced. Neurological:      General: No focal deficit present. Mental Status: She is alert and oriented to person, place, and time. Mental status is at baseline. Cranial Nerves: No cranial nerve deficit. Sensory: No sensory deficit. Motor: No weakness. Coordination: Coordination normal.   Psychiatric:         Mood and Affect: Mood normal.       DIAGNOSTIC RESULTS     EKG: All EKG's are interpreted by the Emergency Department Physician who either signs or Co-signsthis chart in the absence of a cardiologist.    EKG shows atrial fibrillation at 83 bpm there is a right bundle branch block ST segment inversions in leads V1, V2, V3 V4 V5 V6 no ventricular ectopy  ms    RADIOLOGY:   Non-plain filmimages such as CT, Ultrasound and MRI are read by the radiologist. Plain radiographic images are visualized and preliminarily interpreted by the emergency physician with the below findings:    NA    Interpretation per the Radiologist below, if available at the time ofthis note:    CT ABDOMEN PELVIS WO CONTRAST Additional Contrast? Oral   Final Result      Complex 10.0 x 8.0 x 10.6 cm mass with septated central low attenuation fluid collections as discussed. Mass displaces cecum and ascending colon anteriorly. Differential includes malignancy including appendiceal mucocele/carcinoma, as well as    retroperitoneal sarcoma with central necrotic change. Infection/abscess secondary consideration, minimal presence of adjacent inflammatory change.       8.0 x 5.8 x 7.5 cm left adnexal cysts as discussed, most likely ovarian in etiology. If clinical concern warrants, pelvic sonography may be obtained for further evaluation. Other findings discussed. All CT scans at this facility use dose modulation, iterative reconstruction, and/or weight based dosing when appropriate to reduce radiation dose to as low as reasonably achievable. CT ABDOMEN PELVIS W IV CONTRAST Additional Contrast? None   Final Result   Impression:   1. In the right lower quadrant of the abdomen, pericolic abscesses versus pericolic mass is seen. This is thought more likely abscess. 2. A cystic mass is again seen in the pelvis and is likely in the ovary. It is unchanged from previous examination   The patient will be given oral contrast and rescanned to evaluate the right lower quadrant. All CT scans at this facility use dose modulation, iterative reconstruction, and/or weight based dosing                                                 CT Head WO Contrast   Final Result   1. Age-appropriate cortical atrophy and periventricular microangiopathy. When compared to previous study there has been no significant interval change. 2. No acute hemorrhage or extra-axial fluid collection         All CT scans at this facility use dose modulation, iterative reconstruction, and/or weight based dosing when appropriate to reduce radiation dose to as low as reasonably achievable.                ED BEDSIDE ULTRASOUND:   Performed by ED Physician - none    LABS:  Labs Reviewed   COMPREHENSIVE METABOLIC PANEL - Abnormal; Notable for the following components:       Result Value    Sodium 130 (*)     Potassium 3.1 (*)     Glucose 170 (*)     Albumin 3.0 (*)     All other components within normal limits   CBC WITH AUTO DIFFERENTIAL - Abnormal; Notable for the following components:    WBC 15.0 (*)     RBC 3.73 (*)     Hemoglobin 10.1 (*)     Hematocrit 31.4 (*)     MCHC 32.3 (*)     Neutrophils Absolute 13.3 (*)     Lymphocytes Absolute 0.6 (*) Monocytes Absolute 1.1 (*)     All other components within normal limits   POCT CREATININE - URINE - Normal   LACTIC ACID   LIPASE   TROPONIN   CK   URINALYSIS WITH REFLEX TO CULTURE       All other labs were within normal range or not returned as of this dictation. EMERGENCY DEPARTMENT COURSE and DIFFERENTIAL DIAGNOSIS/MDM:   Vitals:    Vitals:    07/18/22 1830 07/18/22 1845 07/18/22 1915 07/18/22 1921   BP: 120/62   102/67   Pulse: 89 87 88 95   Resp: 24 26 22 22   Temp:    99.9 °F (37.7 °C)   TempSrc:    Oral   SpO2: 94% 93% 93%    Weight:       Height:              MDM  Number of Diagnoses or Management Options  Abdominal mass, unspecified abdominal location  Right sided abdominal pain  Diagnosis management comments: Patient presented to the ED with abdominal pain which she states is been ongoing for between 5 and 7 days, there is no nausea vomiting or fevers, she states she does have intermittent periods of diarrhea, she does speak [place], and I do not have a [place]  available at the time of her arrival, there was no family members present. Labs were obtained, as well as a CT scan of the abdomen pelvis, speaking with Dr. Rosa Carlton of radiology, there is concerns for abscess versus mass to the right lower, as well as right upper quadrant of the abdomen's, she is requesting CT scan with oral contrast to better differentiate etiology. Patient was medicated for pain, and started on Zosyn to white count of 15,000. Patient is diffusely tender across all areas of her abdomen. Lactic acid level is 1.2. I did speak with the [facility] hospitalist in order to admit the patient for abscesses versus mass, pending CT scan of the abdomen pelvis. At this time they declined admission until such differentiation can be made determine who should be in consult with this patient. Patient drank oral contrast, and recommendations were at least a minimum of 1 to 2 hours of transition time prior to rescan.   Son did [de-identified] through patient evaluation presented to the emerge department, I did discuss the findings with him he did discussed them with his mother and Lanette Parker 89, and pending final CT scan report for admission. Patient received on signout at 1800 pending CT results which demonstrate complex 10 x 8 x 10.6 cm mass displacing the cecum and ascending colon anteriorly, primary differential includes appendiceal mucocele/carcinoma as well as retroperitoneal sarcoma with central necrotic change, less likely consideration is abscess/infection. As well as likely ovarian cysts. Prior to my evaluation patient had received IV Zosyn. As well as p.o. potassium for a potassium of 3.1, 1 L IVF, Zofran x2, morphine x2. Admitted to Dr. Mitch Donald. CRITICAL CARE TIME   Total Critical Care time was 0 minutes, excluding separately reportableprocedures. There was a high probability of clinicallysignificant/life threatening deterioration in the patient's condition which required my urgent intervention. 0    CONSULTS:  None    PROCEDURES:  Unless otherwise noted below, none     Procedures    FINAL IMPRESSION      1. Abdominal mass, unspecified abdominal location    2. Right sided abdominal pain          DISPOSITION/PLAN   DISPOSITION Decision To Admit 07/18/2022 03:35:24 PM      PATIENT REFERRED TO:  No follow-up provider specified.     DISCHARGE MEDICATIONS:  New Prescriptions    No medications on file          (Please note that portions of this note were completed with a voice recognition program.  Efforts were made to edit the dictations but occasionally words are mis-transcribed.)    ASHLEY Srinivasan (electronically signed)  Attending Emergency Physician         Arabella Srinivasan  07/18/22 1930

## 2022-07-18 NOTE — ED NOTES
Oral Contrast finished at [address] [place] [address], [address] [place] [address]  07/18/22 1956

## 2022-07-18 NOTE — ED TRIAGE NOTES
Pt presents to ED via EMS from home c/o RLQ abdominal pain, dizziness and diarrhea x1 week  Pt denies emesis, fever, chills  Pt Aox4 on arrival

## 2022-07-19 PROBLEM — R19.00 ABDOMINAL MASS: Status: ACTIVE | Noted: 2022-07-19

## 2022-07-19 LAB
ALBUMIN SERPL-MCNC: 2.3 G/DL (ref 3.5–4.6)
ALP BLD-CCNC: 56 U/L (ref 40–130)
ALT SERPL-CCNC: 27 U/L (ref 0–33)
ANION GAP SERPL CALCULATED.3IONS-SCNC: 10 MEQ/L (ref 9–15)
ANION GAP SERPL CALCULATED.3IONS-SCNC: 10 MEQ/L (ref 9–15)
AST SERPL-CCNC: 27 U/L (ref 0–35)
BACTERIA: NEGATIVE /HPF
BASOPHILS ABSOLUTE: 0 K/UL (ref 0–0.2)
BASOPHILS RELATIVE PERCENT: 0.3 %
BILIRUB SERPL-MCNC: 0.6 MG/DL (ref 0.2–0.7)
BILIRUBIN URINE: NEGATIVE
BLOOD, URINE: ABNORMAL
BUN BLDV-MCNC: 12 MG/DL (ref 8–23)
BUN BLDV-MCNC: 12 MG/DL (ref 8–23)
CALCIUM SERPL-MCNC: 8.2 MG/DL (ref 8.5–9.9)
CALCIUM SERPL-MCNC: 8.3 MG/DL (ref 8.5–9.9)
CHLORIDE BLD-SCNC: 100 MEQ/L (ref 95–107)
CHLORIDE BLD-SCNC: 98 MEQ/L (ref 95–107)
CLARITY: CLEAR
CO2: 26 MEQ/L (ref 20–31)
CO2: 26 MEQ/L (ref 20–31)
COLOR: YELLOW
CREAT SERPL-MCNC: 0.57 MG/DL (ref 0.5–0.9)
CREAT SERPL-MCNC: 0.61 MG/DL (ref 0.5–0.9)
DIGOXIN LEVEL: 0.5 NG/ML (ref 0.8–2)
EKG ATRIAL RATE: 78 BPM
EKG Q-T INTERVAL: 422 MS
EKG QRS DURATION: 152 MS
EKG QTC CALCULATION (BAZETT): 495 MS
EKG R AXIS: -64 DEGREES
EKG T AXIS: 13 DEGREES
EKG VENTRICULAR RATE: 83 BPM
EOSINOPHILS ABSOLUTE: 0.1 K/UL (ref 0–0.7)
EOSINOPHILS RELATIVE PERCENT: 0.5 %
EPITHELIAL CELLS, UA: ABNORMAL /HPF (ref 0–5)
GFR AFRICAN AMERICAN: >60
GFR AFRICAN AMERICAN: >60
GFR NON-AFRICAN AMERICAN: >60
GFR NON-AFRICAN AMERICAN: >60
GLOBULIN: 3.5 G/DL (ref 2.3–3.5)
GLUCOSE BLD-MCNC: 113 MG/DL (ref 70–99)
GLUCOSE BLD-MCNC: 118 MG/DL (ref 70–99)
GLUCOSE URINE: NEGATIVE MG/DL
HCT VFR BLD CALC: 28.6 % (ref 37–47)
HEMOGLOBIN: 9.3 G/DL (ref 12–16)
HYALINE CASTS: ABNORMAL /HPF (ref 0–5)
KETONES, URINE: ABNORMAL MG/DL
LEUKOCYTE ESTERASE, URINE: ABNORMAL
LYMPHOCYTES ABSOLUTE: 0.7 K/UL (ref 1–4.8)
LYMPHOCYTES RELATIVE PERCENT: 4.4 %
MAGNESIUM: 1.8 MG/DL (ref 1.7–2.4)
MAGNESIUM: 2 MG/DL (ref 1.7–2.4)
MCH RBC QN AUTO: 27.5 PG (ref 27–31.3)
MCHC RBC AUTO-ENTMCNC: 32.4 % (ref 33–37)
MCV RBC AUTO: 84.8 FL (ref 82–100)
MONOCYTES ABSOLUTE: 1.2 K/UL (ref 0.2–0.8)
MONOCYTES RELATIVE PERCENT: 7.6 %
NEUTROPHILS ABSOLUTE: 13.5 K/UL (ref 1.4–6.5)
NEUTROPHILS RELATIVE PERCENT: 87.2 %
NITRITE, URINE: NEGATIVE
PDW BLD-RTO: 14.2 % (ref 11.5–14.5)
PH UA: 5.5 (ref 5–9)
PLATELET # BLD: 306 K/UL (ref 130–400)
POTASSIUM REFLEX MAGNESIUM: 3.2 MEQ/L (ref 3.4–4.9)
POTASSIUM REFLEX MAGNESIUM: 3.2 MEQ/L (ref 3.4–4.9)
PROTEIN UA: 100 MG/DL
RBC # BLD: 3.37 M/UL (ref 4.2–5.4)
RBC UA: ABNORMAL /HPF (ref 0–5)
RENAL EPITHELIAL, UA: ABNORMAL /HPF
SODIUM BLD-SCNC: 134 MEQ/L (ref 135–144)
SODIUM BLD-SCNC: 136 MEQ/L (ref 135–144)
SPECIFIC GRAVITY UA: 1.04 (ref 1–1.03)
TOTAL PROTEIN: 5.8 G/DL (ref 6.3–8)
URINE REFLEX TO CULTURE: YES
UROBILINOGEN, URINE: 0.2 E.U./DL
WBC # BLD: 15.5 K/UL (ref 4.8–10.8)
WBC UA: ABNORMAL /HPF (ref 0–5)

## 2022-07-19 PROCEDURE — 2580000003 HC RX 258

## 2022-07-19 PROCEDURE — 93005 ELECTROCARDIOGRAM TRACING: CPT | Performed by: INTERNAL MEDICINE

## 2022-07-19 PROCEDURE — 80162 ASSAY OF DIGOXIN TOTAL: CPT

## 2022-07-19 PROCEDURE — 6360000002 HC RX W HCPCS

## 2022-07-19 PROCEDURE — 1210000000 HC MED SURG R&B

## 2022-07-19 PROCEDURE — 85025 COMPLETE CBC W/AUTO DIFF WBC: CPT

## 2022-07-19 PROCEDURE — 80053 COMPREHEN METABOLIC PANEL: CPT

## 2022-07-19 PROCEDURE — 99223 1ST HOSP IP/OBS HIGH 75: CPT | Performed by: RADIOLOGY

## 2022-07-19 PROCEDURE — 93010 ELECTROCARDIOGRAM REPORT: CPT | Performed by: INTERNAL MEDICINE

## 2022-07-19 PROCEDURE — 86304 IMMUNOASSAY TUMOR CA 125: CPT

## 2022-07-19 PROCEDURE — 6370000000 HC RX 637 (ALT 250 FOR IP): Performed by: INTERNAL MEDICINE

## 2022-07-19 PROCEDURE — 36415 COLL VENOUS BLD VENIPUNCTURE: CPT

## 2022-07-19 PROCEDURE — 2700000000 HC OXYGEN THERAPY PER DAY

## 2022-07-19 PROCEDURE — 99221 1ST HOSP IP/OBS SF/LOW 40: CPT | Performed by: COLON & RECTAL SURGERY

## 2022-07-19 PROCEDURE — 83735 ASSAY OF MAGNESIUM: CPT

## 2022-07-19 PROCEDURE — 82378 CARCINOEMBRYONIC ANTIGEN: CPT

## 2022-07-19 RX ORDER — FERROUS SULFATE 325(65) MG
325 TABLET ORAL EVERY OTHER DAY
Status: DISCONTINUED | OUTPATIENT
Start: 2022-07-19 | End: 2022-07-22 | Stop reason: HOSPADM

## 2022-07-19 RX ORDER — AMLODIPINE BESYLATE 5 MG/1
5 TABLET ORAL DAILY
Status: DISCONTINUED | OUTPATIENT
Start: 2022-07-19 | End: 2022-07-22 | Stop reason: HOSPADM

## 2022-07-19 RX ORDER — LEVOTHYROXINE SODIUM 88 UG/1
88 TABLET ORAL DAILY
Status: DISCONTINUED | OUTPATIENT
Start: 2022-07-19 | End: 2022-07-22 | Stop reason: HOSPADM

## 2022-07-19 RX ORDER — DIGOXIN 125 MCG
125 TABLET ORAL DAILY
Status: DISCONTINUED | OUTPATIENT
Start: 2022-07-19 | End: 2022-07-22 | Stop reason: HOSPADM

## 2022-07-19 RX ORDER — CARVEDILOL 6.25 MG/1
6.25 TABLET ORAL 2 TIMES DAILY
Status: DISCONTINUED | OUTPATIENT
Start: 2022-07-19 | End: 2022-07-22 | Stop reason: HOSPADM

## 2022-07-19 RX ORDER — CITALOPRAM 10 MG/1
20 TABLET ORAL DAILY
Status: DISCONTINUED | OUTPATIENT
Start: 2022-07-19 | End: 2022-07-22 | Stop reason: HOSPADM

## 2022-07-19 RX ADMIN — PIPERACILLIN AND TAZOBACTAM 3375 MG: 3; .375 INJECTION, POWDER, LYOPHILIZED, FOR SOLUTION INTRAVENOUS at 00:12

## 2022-07-19 RX ADMIN — LEVOTHYROXINE SODIUM 88 MCG: 88 TABLET ORAL at 15:07

## 2022-07-19 RX ADMIN — PIPERACILLIN AND TAZOBACTAM 3375 MG: 3; .375 INJECTION, POWDER, LYOPHILIZED, FOR SOLUTION INTRAVENOUS at 10:44

## 2022-07-19 RX ADMIN — FERROUS SULFATE TAB 325 MG (65 MG ELEMENTAL FE) 325 MG: 325 (65 FE) TAB at 15:07

## 2022-07-19 RX ADMIN — PIPERACILLIN AND TAZOBACTAM 3375 MG: 3; .375 INJECTION, POWDER, LYOPHILIZED, FOR SOLUTION INTRAVENOUS at 18:11

## 2022-07-19 RX ADMIN — KETOROLAC TROMETHAMINE 15 MG: 15 INJECTION, SOLUTION INTRAMUSCULAR; INTRAVENOUS at 18:21

## 2022-07-19 RX ADMIN — Medication 10 ML: at 11:43

## 2022-07-19 RX ADMIN — CITALOPRAM HYDROBROMIDE 20 MG: 10 TABLET ORAL at 15:06

## 2022-07-19 ASSESSMENT — ENCOUNTER SYMPTOMS
RESPIRATORY NEGATIVE: 1
CONSTIPATION: 0
NAUSEA: 0
SHORTNESS OF BREATH: 0
WHEEZING: 0
ABDOMINAL PAIN: 1
DIARRHEA: 1
BACK PAIN: 0
COUGH: 0
PHOTOPHOBIA: 0
VOMITING: 0
EYES NEGATIVE: 1

## 2022-07-19 ASSESSMENT — PAIN SCALES - GENERAL
PAINLEVEL_OUTOF10: 7
PAINLEVEL_OUTOF10: 0

## 2022-07-19 ASSESSMENT — PAIN DESCRIPTION - LOCATION: LOCATION: ABDOMEN

## 2022-07-19 ASSESSMENT — PAIN DESCRIPTION - DESCRIPTORS: DESCRIPTORS: ACHING

## 2022-07-19 ASSESSMENT — PAIN DESCRIPTION - ORIENTATION: ORIENTATION: RIGHT

## 2022-07-19 NOTE — PROGRESS NOTES
Hospitalist Progress Note      PCP: Diego Marcos MD    Date of Admission: 7/18/2022    Chief Complaint:    Chief Complaint   Patient presents with    Abdominal Pain       Subjective:  Patient denies fevers, chills, sweats, CP, SOB, diarrhea or burning micturition. 12 point ROS negative other than mentioned above     Medications:  Reviewed    Infusion Medications    sodium chloride       Scheduled Medications    morphine  4 mg IntraVENous Once    ondansetron  4 mg IntraVENous Once    sodium chloride flush  5-40 mL IntraVENous 2 times per day    piperacillin-tazobactam  3,375 mg IntraVENous Q8H     PRN Meds: sodium chloride flush, sodium chloride, ondansetron **OR** ondansetron, polyethylene glycol, acetaminophen **OR** acetaminophen, ketorolac      Intake/Output Summary (Last 24 hours) at 7/19/2022 1256  Last data filed at 7/19/2022 0521  Gross per 24 hour   Intake 1715 ml   Output --   Net 1715 ml       Exam:    BP (!) 106/58   Pulse 76   Temp 98.3 °F (36.8 °C) (Oral)   Resp 20   Ht 5' 6\" (1.676 m)   Wt 161 lb 8 oz (73.3 kg)   SpO2 93%   BMI 26.07 kg/m²     General appearance: No apparent distress, appears stated age and cooperative. HEENT:  Conjunctivae/corneas clear. Neck: Trachea midline. Respiratory:  Normal respiratory effort. Clear to auscultation  Cardiovascular: Regular rate and rhythm  Abdomen: Soft, non-tender, non-distended with normal bowel sounds.   Musculoskeletal: No clubbing, cyanosis or edema bilaterally  Neuro: Non Focal.   Capillary Refill: Brisk,< 3 seconds   Peripheral Pulses: +2 palpable, equal bilaterally     Labs:   Recent Labs     07/18/22  1230 07/19/22  0614   WBC 15.0* 15.5*   HGB 10.1* 9.3*   HCT 31.4* 28.6*    306     Recent Labs     07/18/22  1230 07/18/22  2303 07/19/22  0614   * 134* 136   K 3.1* 3.2* 3.2*   CL 95 98 100   CO2 26 26 26   BUN 12 12 12   CREATININE 0.57 0.57 0.61   CALCIUM 8.8 8.2* 8.3*     Recent Labs     07/18/22  1230 07/19/22  0614   AST 27 27   ALT 29 27   BILITOT 0.5 0.6   ALKPHOS 66 56     No results for input(s): INR in the last 72 hours. Recent Labs     07/18/22  1230   CKTOTAL 46   TROPONINI <0.010       Urinalysis:      Lab Results   Component Value Date/Time    NITRU Negative 07/18/2022 12:30 PM    WBCUA 20-50 07/18/2022 12:30 PM    BACTERIA Negative 07/18/2022 12:30 PM    RBCUA 6-10 07/18/2022 12:30 PM    BLOODU SMALL 07/18/2022 12:30 PM    SPECGRAV 1.044 07/18/2022 12:30 PM    GLUCOSEU Negative 07/18/2022 12:30 PM       Radiology:  CT ABDOMEN PELVIS WO CONTRAST Additional Contrast? Oral   Final Result      Complex 10.0 x 8.0 x 10.6 cm mass with septated central low attenuation fluid collections as discussed. Mass displaces cecum and ascending colon anteriorly. Differential includes malignancy including appendiceal mucocele/carcinoma, as well as    retroperitoneal sarcoma with central necrotic change. Infection/abscess secondary consideration, minimal presence of adjacent inflammatory change. 8.0 x 5.8 x 7.5 cm left adnexal cysts as discussed, most likely ovarian in etiology. If clinical concern warrants, pelvic sonography may be obtained for further evaluation. Other findings discussed. All CT scans at this facility use dose modulation, iterative reconstruction, and/or weight based dosing when appropriate to reduce radiation dose to as low as reasonably achievable. CT ABDOMEN PELVIS W IV CONTRAST Additional Contrast? None   Final Result   Impression:   1. In the right lower quadrant of the abdomen, pericolic abscesses versus pericolic mass is seen. This is thought more likely abscess. 2. A cystic mass is again seen in the pelvis and is likely in the ovary. It is unchanged from previous examination   The patient will be given oral contrast and rescanned to evaluate the right lower quadrant.        All CT scans at this facility use dose modulation, iterative reconstruction, and/or weight based dosing CT Head WO Contrast   Final Result   1. Age-appropriate cortical atrophy and periventricular microangiopathy. When compared to previous study there has been no significant interval change. 2. No acute hemorrhage or extra-axial fluid collection         All CT scans at this facility use dose modulation, iterative reconstruction, and/or weight based dosing when appropriate to reduce radiation dose to as low as reasonably achievable. Assessment/Plan:         Abdominal pain - mass vs abscess       - check CEA; family is considering IR biopsy which may be difficult due to anatomy; continue empiric Abx       A-fib ([address] -Continue home meds; hold anticoagulation       Hyponatremia -Resolved. Hypokalemia -Replete       Anemia - Continue home meds       Essential hypertension -Continue home meds       CHF (congestive heart failure) ([address] -Continue dmitri emeds       Hypothyroid -Continue home meds       Active Hospital Problems    Diagnosis Date Noted    A-fib [facility]) [I48.91]      Priority: High    Abdominal mass [R19.00] 07/19/2022     Priority: Medium    Abdominal pain [R10.9] 07/18/2022     Priority: Medium    Hyponatremia [E87.1] 07/18/2022     Priority: Medium    Hypokalemia [E87.6] 07/18/2022     Priority: Medium    Anemia [D64.9] 07/18/2022     Priority: Medium    CHF (congestive heart failure) ([address] [I50.9] 07/18/2022     Priority: Medium    Hypothyroid [E03.9] 07/18/2022     Priority: Medium    Essential hypertension [I10] 02/16/2018        Additional work up or/and treatment plan may be added today or then after based on clinical progression. I am managing a portion of pt care. Some medical issues are handled by other specialists. Additional work up and treatment should be done in out pt setting by pt PCP and other out pt providers.      In addition to examining and evaluating pt, I spent additional time explaining care, normal and abnormal findings, and treatment plan. All of pt questions were answered. Counseling, diet and education were  provided. Case will be discussed with nursing staff when appropriate. Family will be updated if and when appropriate.       Diet: Diet NPO    Code Status: Full Code    PT/OT Eval     Electronically signed by Manoj Shirley MD on 7/19/2022 at 12:56 PM

## 2022-07-19 NOTE — CONSULTS
CC:   Chief Complaint   Patient presents with    Abdominal Pain        HPI: Patient is a pleasant 80-year-old woman who presented to the hospital yesterday with a complaint of pain in the right upper and right lower abdominal quadrants for the past week. Patient denied nausea or vomiting. She did admit to diarrhea which was greenish in color. She denies any weight change or weight loss. History was obtained from her son over the phone since patient is [place] speaking only. Patient has a past medical history of hypertension, congestive heart failure, ascending aortic aneurysm. A CT scan was performed which demonstrated a centimeter mass in the right lower quadrant abutting from the ascending colon with a central location of decreased attenuation which may represent liquid or necrosis. This may represent malignancy versus abscess. Interventional radiology was consulted for potential drainage. Family History   Problem Relation Age of Onset    Arthritis Mother     Arthritis Father     High Blood Pressure Father        Past Surgical History:   Procedure Laterality Date    JOINT REPLACEMENT Bilateral     knees    PLEURAL CATH INSERTION Left 2/25/2021    LEFT PLEURAL CATHETER INSERTION performed by Julio Keen MD at [place] [address] Left 01/29/2021    total of 755 cc removed per Dr Ángel Covington specimen sent to lab        Past Medical History:   Diagnosis Date    Ascending aortic aneurysm ([address] 6/23/2017    Charcot Arleen Tooth muscular atrophy     CHF (congestive heart failure) (HCC)     Chronic diastolic CHF (congestive heart failure) ([address] 4/1/2022    Depression     Descending aortic aneurysm ([address] 6/23/2017    Essential hypertension 2/16/2018    Headache     HTN (hypertension)     Impaired mobility and activities of daily living     Lumbar stenosis with neurogenic claudication     Myelopathy ([address]     Osteoarthritis        Social History     Socioeconomic History    Marital status:     Tobacco Use Smoking status: Never    Smokeless tobacco: Never   Substance and Sexual Activity    Alcohol use: No     Alcohol/week: 0.0 standard drinks    Drug use: No   Social History Narrative    Lives With: Alone, son lives down the street, dtr is in the area    Has a son in the area    [facility]  in [address]: One level    Home Access: Stairs to enter with rails- Number of Steps: 2- Rails: Both    Bathroom Shower/Tub: Tub/Shower unit, Bathroom Equipment: Grab bars in shower, Shower chair    Home Equipment: Rolling walker, Cane(Pt infrequemtly uses DME for ambulation and prefers to furniture walk in home)    ADL Assistance: [address] [facility], [address] [facility] Responsibilities: Yes    Ambulation Assistance: Independent, Transfer Assistance: Independent    Additional Comments: Son stops over 2 times daily           Allergies   Allergen Reactions    Latex     Tape [Adhesive Tape]        No current facility-administered medications on file prior to encounter. Current Outpatient Medications on File Prior to Encounter   Medication Sig Dispense Refill    digoxin (LANOXIN) 125 MCG tablet TAKE 1 TABLET DAILY 90 tablet 3    furosemide (LASIX) 20 MG tablet TAKE 1 TABLET DAILY 90 tablet 3    amLODIPine (NORVASC) 5 MG tablet Take 1 tablet by mouth daily 90 tablet 3    pantoprazole (PROTONIX) 40 MG tablet TAKE 1 TABLET DAILY 90 tablet 3    carvedilol (COREG) 6.25 MG tablet TAKE 1 TABLET TWICE A  tablet 3    XARELTO 15 MG TABS tablet TAKE 1 TABLET DAILY 90 tablet 3    nitroGLYCERIN (NITROSTAT) 0.4 MG SL tablet up to max of 3 total doses.  If no relief after 1 dose, call 911. 25 tablet 3    budesonide-formoterol (SYMBICORT) 160-4.5 MCG/ACT AERO Inhale 2 puffs into the lungs 2 times daily 1 Inhaler 3    ferrous sulfate (IRON 325) 325 (65 Fe) MG tablet Take 1 tablet by mouth 2 times daily (with meals) 30 tablet 3    Carboxymethylcellulose Sodium (EYE DROPS OP) Apply 1 drop to eye as needed (Dry eyes) Indications: Systane       Boswellia-Glucosamine-Vit D (OSTEO BI-FLEX ONE PER DAY) TABS Take by mouth daily      Multiple Vitamins-Minerals (CENTRUM SILVER ULTRA WOMENS) TABS Take by mouth daily      vitamin B-12 (CYANOCOBALAMIN) 1000 MCG tablet Take 1,000 mcg by mouth daily      aspirin 81 MG tablet Take 81 mg by mouth daily       citalopram (CELEXA) 20 MG tablet Take 20 mg by mouth daily       SYNTHROID 88 MCG tablet Take 88 mcg by mouth daily          Review of Systems   Constitutional: Negative. Negative for chills, diaphoresis and fever. HENT: Negative. Negative for congestion, ear pain, hearing loss and tinnitus. Eyes: Negative. Negative for photophobia. Respiratory: Negative. Negative for cough, shortness of breath and wheezing. Cardiovascular: Negative. Negative for chest pain, palpitations and leg swelling. Gastrointestinal:  Positive for abdominal pain and diarrhea. Negative for constipation, nausea and vomiting. Genitourinary: Negative. Negative for dysuria, flank pain, frequency, hematuria and urgency. Musculoskeletal: Negative. Negative for back pain and neck pain. Skin: Negative. Negative for rash. Allergic/Immunologic: Negative for environmental allergies. Neurological: Negative. Negative for dizziness, tremors, weakness and headaches. Hematological:  Does not bruise/bleed easily. Psychiatric/Behavioral: Negative. Negative for hallucinations and suicidal ideas. The patient is not nervous/anxious. OBJECTIVE:  BP (!) 106/58   Pulse 76   Temp 98.3 °F (36.8 °C) (Oral)   Resp 20   Ht 5' 6\" (1.676 m)   Wt 161 lb 8 oz (73.3 kg)   SpO2 93%   BMI 26.07 kg/m²     Physical Exam  Constitutional:       General: She is not in acute distress. Appearance: She is well-developed. She is not diaphoretic. HENT:      Head: Normocephalic and atraumatic. Nose: Nose normal.      Mouth/Throat:      Pharynx: No oropharyngeal exudate. sulfate, 450 mL. Intravenous contrast medium:  None. Comparison:  CT abdomen pelvis, July 18, 2022, 1444 hours, February 8, 2021. Findings: Residual intravenous contrast medium from recent CT examination identified. Lungs:  Lung bases clear. Cardiac size enlarged, unchanged. Calcification at level of mitral valve. Small hiatal hernia. Liver:  Normal in size, shape, and attenuation. Bile Ducts:  Normal in caliber. Gallbladder:  No stones or wall thickening. Pancreas:  Normal without masses, cysts, ductal dilatation or calcification. Spleen:  Normal in size without masses or calcifications. No splenules. Kidneys:  Normal in size. No hydronephrosis, masses, or stones. Adrenals:  Normal. Small bowel:  Normal in caliber. See \"peritoneum \". Appendix:  Not visualized. Colon:  Normal in caliber. See \"peritoneum \". Peritoneum:  No free air. A bilobed, 10.0 x 8.0 x 10.6 cm mass having central rounded septated areas of low attenuation, 3.7 x 3.5 x 5.4 cm, and displacing cecum and ascending colon anteriorly (series 2, image 50, series 601, image 54, series 602, image 36). No calcification identified. Trace adjacent fat stranding demonstrated. Vessels: Aorta normal in course and caliber. Lymph nodes:  Retroperitoneal:  No enlarged retroperitoneal lymph nodes. Mesenteric:  No enlarged mesenteric lymph nodes. Pelvic: No enlarged pelvic lymph nodes. Ureters: Normal in course and caliber. No calcifications. Bladder: No wall thickening. Reproductive organs: 8.0 x 5.8 x 7.5 cm left adnexal cyst displacing urinary bladder right and laterally, anteriorly, and inferiorly (series 2, image 68, series 601, image 42, series 602, image 52). Abdominal Wall: Fat identified bilateral inguinal canals. Bones:  No bone lesions. Multilevel disc space narrowing lumbar spine. No post operative changes. Complex 10.0 x 8.0 x 10.6 cm mass with septated central low attenuation fluid collections as discussed.  Mass displaces cecum and ascending colon anteriorly. Differential includes malignancy including appendiceal mucocele/carcinoma, as well as retroperitoneal sarcoma with central necrotic change. Infection/abscess secondary consideration, minimal presence of adjacent inflammatory change. 8.0 x 5.8 x 7.5 cm left adnexal cysts as discussed, most likely ovarian in etiology. If clinical concern warrants, pelvic sonography may be obtained for further evaluation. Other findings discussed. All CT scans at this facility use dose modulation, iterative reconstruction, and/or weight based dosing when appropriate to reduce radiation dose to as low as reasonably achievable. CT Head WO Contrast    Result Date: 7/18/2022  CT OF THE BRAIN WITHOUT CONTRAST HISTORY: Rena Wolf is a Female of 80 years age with history of headache and dizziness. COMPARISON: July 19, 2017 TECHNIQUE:  Spiral CT examination of the brain was performed without intravenous contrast.  Images were obtained from the base of the skull up to the convexities in axial slices, and then examined in the blood, brain parenchymal and bony windows. FINDINGS:  No acute changes are noted. There is no evidence for mass, mass effect or midline shift. No abnormal extra-axial fluid collections are appreciated. Age-appropriate cortical volume loss is seen. There are patchy areas of hypoattenuation in a sub-cortical, central white and periventricular distribution consistent with nonspecific ischemic small vessel disease. Calcifications are again seen in the basal ganglia. There are no acute parenchymal alterations, blood byproducts or abnormal extracerebral fluid. The calvarium is grossly intact. The visualized paranasal sinuses show mucoceles or polyps in the maxillary sinuses. Mild fluid is seen in the mastoid air cells that is not significantly changed and is consistent with chronic changes. Marleni Gip 1.Age-appropriate cortical atrophy and periventricular microangiopathy.  When compared to previous study there has been no significant interval change. 2. No acute hemorrhage or extra-axial fluid collection All CT scans at this facility use dose modulation, iterative reconstruction, and/or weight based dosing when appropriate to reduce radiation dose to as low as reasonably achievable. CT ABDOMEN PELVIS W IV CONTRAST Additional Contrast? None    Result Date: 7/18/2022  Comparison: February 8, 2021 History: Right lower quadrant abdominal pain  Technique: CT ABDOMEN PELVIS W IV CONTRAST Helically Acquired CT of the  abdomen and pelvis was performed during the intravenous infusion of 100 mL of Isovue-370. Axial delayed images were also performed. Reformatted sagittal and coronal images were also  obtained. Findings: The visualized bases of the lungs shows no consolidating pneumonia or pleural effusion. There is atelectasis seen in the bases. The thoracic aorta and visualized portion is tortuous and mildly dilated at 4 cm in greatest transverse diameter which is stable. . In the right lower quadrant of the abdomen there are multiple bowel loops seen that are peripheral to hypodense collections which could be from fluid or proteinaceous collections such as abscess. This the loops of bowel appear to be a ascending colon and  ileum. The largest walled off collections measure 4.5 x 3.8 x 3.3 cm and 4.4 x 3.4 x 4.2 cm. There is pericolic stranding seen. The left colon is unremarkable. A cystic structure is again seen in the pelvis that measures 6.7 x 8.6 x 9.8 cm. A small amount of free fluid is seen in the posterior pelvis. Bilateral inguinal hernias are seen. The hernia on the left contains fat. The hernia on the right contains a loop of small bowel. No bowel obstruction is seen. The liver is decreased in attenuation and is enlarged. The spleen, pancreas and gallbladder and adrenal glands are unremarkable. Bilaterally both kidneys show uptake of contrast with no evidence for hydronephrosis or renal calculi.  Prominent atherosclerotic calcifications are seen. The bladder is partially decompressed. The bladder wall however also appears to be thickened. Degenerative changes are seen in the spine at multiple levels. Intervertebral disc space narrowing is seen at L1 to. Slight anterolisthesis of L4 on L5 is noted. There is vacuum anomaly seen at all levels in the lumbar spine. Impression: 1. In the right lower quadrant of the abdomen, pericolic abscesses versus pericolic mass is seen. This is thought more likely abscess. 2. A cystic mass is again seen in the pelvis and is likely in the ovary. It is unchanged from previous examination The patient will be given oral contrast and rescanned to evaluate the right lower quadrant. All CT scans at this facility use dose modulation, iterative reconstruction, and/or weight based dosing        ASSESSMENT ANDPLAN:    Assessment: Patient with pain in the right lower quadrant, CT scan demonstrated a malignant mass with central necrosis versus possible abscess. I discussed with the patient's son of the potential risk of drainage or biopsy since the bowel loops are not well seen due to the inflammatory mass, there is a likelihood of bowel puncture which would require emergent surgery, additionally it is unclear if this is a mass which cannot be treated with any percutaneous drainage. Plan: Patient's son is going to discuss the options with his siblings and family and make a decision and let us know what they would like to proceed with.     Bridget Malik MD, [address] [facility]

## 2022-07-19 NOTE — FLOWSHEET NOTE
20:15: patient arrived to the floor via cart. 20:30 her lungs are clear,apical pulse regular,no jugular vein distention,abdomen soft with facial grimacing during deep palpation to her right abdomen. 22:00 unable to use the  since no Citizen of [place] . 2:50 assisted patient to the bathroom,her gait is slow but steady with the walker.

## 2022-07-19 NOTE — PROGRESS NOTES
1045: Assessment complete. VSS on 3 L NC which is pt's baseline. Patient alert and oriented-unable to communicate well as pt speaks [place]. Tenderness present upon palpation of abdomen. Pt's son at bedside assisting with language barrier. Pt's son's name is ADA   (Cell: [phone]). Anasatasio is to be contacted regarding any questions or medical decision for his mother. Zosyn administered per MAR. Safety maintained, call light within reach. No complaints. Pt up to the bathroom with 1-assist and walker twice during shift. Pt very SOB and reports dizziness with ambulation. Bedside commode provided for these reasons. Pt calls appropriately, although she is hard to comprehend r/t language barrier, she can voice when she needs to use the bedside commode. Pt educated on using call light and is very complaint with it. Per Dr. Monica Webster, he spoke with patient's son regarding the pro's and con's of surgical intervention related to pt's diagnosis. Pt's son was given time to make a decision as he wanted to consult with siblings. Pt's son came back up to the unit around lunch time and decided to go with the surgical approach. [employer] sent to IR team making them aware that the family is choosing to do the surgery and that pt's son would like to speak with Dr. Monica Webster.     1500: Med pass complete after med rec was done with assistance from pt's son. Digoxin and Coreg held as pt's BP was 112/54, HR 52. Dr. Gabino Green made aware.        Electronically signed by Britney Mckeon RN on 7/19/22 at 4:48 PM EDT

## 2022-07-19 NOTE — CARE COORDINATION
[facility] AT [employer] Case Management Initial Discharge Assessment    Met with Patient and SON (D/T NO [place] TRANSLATION) to discuss discharge plan. PCP: Jeannie Booth MD                                Date of Last Visit: UNSURE    VA Patient: No        VA Notified: no    If no PCP, list provided? N/A    Discharge Planning    Living Arrangements: independently at home    Who do you live with? ALONE, SON LIVES DOWN THE STREET/FAMILY HELPS A LOT    Who helps you with your care:  self or family    If lives at home:     Do you have any barriers navigating in your home? no    Patient can perform ADL? Yes--RIGHT NOW NEEDS A LOT OF ASSISTANCE    Current Services (outpatient and in home) :  None    Dialysis: No    Is transportation available to get to your appointments? Yes--FAMILY    DME Equipment:  yes - HAS WALKER    Respiratory equipment: Continuous Oxygen  3 Liters     Respiratory provider:  yes - MEDICAL SERVICES     Pharmacy:  yes - [facility] [place] [address] with Medication Assistance Program?  No      Patient agreeable to [address]? Yes, [facility]--FREEDOM OF CHOICE PROVIDED    Patient agreeable to SNF/Rehab? Yes, [facility]--FREEDOM OF CHOICE PROVIDED    Other discharge needs identified? N/A    Does Patient Have a High-Risk for Readmission Diagnosis (CHF, PN, MI, COPD)? Yes    If Yes,    Consult with pulmonologist?NO  Consult with cardiologist? No--SEES DR. ROBERTS  Cardiac Rehab referral if EF <35%? Declined  Consult with Pharmacy for medication assessment prior to discharge? Declined  Consult with Behavioral health to aid in depression, anxiety, or coping issues? Declined  Palliative Care Consult? Declined  Pulmonary Rehab order for COPD, PN, and CHF (if EF > 35%)? Declined   Does patient have a reliable scale and know how to read it (for CHF)? Yes  Nutrition consult for CHF?  Declined  Respiratory therapy consult that includes bedside instruction on administration of nebulizers and/or inhalers, and assessment of oxygen and equipment needs in the home? Yes    Initial Discharge Plan? (Note: please see concurrent daily documentation for any updates after initial note). PATIENT NEEDING ASSISTANCE HERE W/ AMBULATION/ADLS. AT [facility] [address], WILL NEED TO GET THERAPY EVALS. PER SON THE PATIENT HAS BEEN AT [facility] AND HE WOULD LIKE HER TO GO THERE. IF SHE IS NOT ABLE TO HE WOULD LIKE [facility]. NO SNF. CM TO FOLLOW.      Readmission Risk              Risk of Unplanned Readmission:  [id number]         Electronically signed by Cj Mcdonough RN on 7/19/2022 at 12:11 PM

## 2022-07-19 NOTE — CONSULTS
Department of General Surgery - Adult  Surgical Service general surgery  Attending Consult Note      Reason for Consult: Abdominal pain/abnormal CAT scan      CHIEF COMPLAINT: Abdominal pain    History Obtained From:  patient, electronic medical record    HISTORY OF PRESENT ILLNESS:                The patient is a 80 y.o. female who presents with abdominal pain for the past 5 days. She was seen through the emergency department and had a CAT scan which showed looks to be an abscess around the ascending colon. Patient speaks little [place]. No translating abilities    CAT scan reviewed.   No obvious abdominal operations on inspection    Past Medical History:        Diagnosis Date    Ascending aortic aneurysm ([address] 6/23/2017    Charcot Arleen Tooth muscular atrophy     CHF (congestive heart failure) (HCC)     Chronic diastolic CHF (congestive heart failure) ([address] 4/1/2022    Depression     Descending aortic aneurysm ([address] 6/23/2017    Essential hypertension 2/16/2018    Headache     HTN (hypertension)     Impaired mobility and activities of daily living     Lumbar stenosis with neurogenic claudication     Myelopathy ([address]     Osteoarthritis      Past Surgical History:        Procedure Laterality Date    JOINT REPLACEMENT Bilateral     knees    PLEURAL CATH INSERTION Left 2/25/2021    LEFT PLEURAL CATHETER INSERTION performed by Maura Monk MD at [facility] Left 01/29/2021    total of 755 cc removed per Dr Nehemias Sim specimen sent to lab     Current Medications:   Current Facility-Administered Medications: morphine sulfate (PF) injection 4 mg, 4 mg, IntraVENous, Once  ondansetron (ZOFRAN) injection 4 mg, 4 mg, IntraVENous, Once  sodium chloride flush 0.9 % injection 5-40 mL, 5-40 mL, IntraVENous, 2 times per day  sodium chloride flush 0.9 % injection 5-40 mL, 5-40 mL, IntraVENous, PRN  0.9 % sodium chloride infusion, , IntraVENous, PRN  ondansetron (ZOFRAN-ODT) disintegrating tablet 4 mg, 4 mg, Oral, Q8H PRN **OR** ondansetron (ZOFRAN) injection 4 mg, 4 mg, IntraVENous, Q6H PRN  polyethylene glycol (GLYCOLAX) packet 17 g, 17 g, Oral, Daily PRN  acetaminophen (TYLENOL) tablet 650 mg, 650 mg, Oral, Q6H PRN **OR** acetaminophen (TYLENOL) suppository 650 mg, 650 mg, Rectal, Q6H PRN  piperacillin-tazobactam (ZOSYN) 3,375 mg in dextrose 5 % 50 mL IVPB extended infusion (mini-bag), 3,375 mg, IntraVENous, Q8H  ketorolac (TORADOL) injection 15 mg, 15 mg, IntraVENous, Q6H PRN  Allergies:  Latex and Tape [adhesive tape]    Social History:   TOBACCO:   reports that she has never smoked. She has never used smokeless tobacco.  ETOH:   reports no history of alcohol use. DRUGS:   reports no history of drug use. Family History:       Problem Relation Age of Onset    Arthritis Mother     Arthritis Father     High Blood Pressure Father        REVIEW OF SYSTEMS:    Review of systems not obtained due to patient factors -little English without translating abilities for [place]    PHYSICAL EXAM:    VITALS:  BP (!) 96/55   Pulse 83   Temp 99.5 °F (37.5 °C)   Resp 20   Ht 5' 6\" (1.676 m)   Wt 161 lb 8 oz (73.3 kg)   SpO2 91%   BMI 26.07 kg/m²   CONSTITUTIONAL: Awake alert no distress  EYES:  Lids and lashes normal, pupils equal, round and reactive to light, extra ocular muscles intact, sclera clear, conjunctiva normal  ENT:  Normocephalic, without obvious abnormality, atraumatic, sinuses nontender on palpation, external ears without lesions, oral pharynx with moist mucus membranes, tonsils without erythema or exudates, gums normal and good dentition.   NECK:  Supple, symmetrical, trachea midline, no adenopathy, thyroid symmetric, not enlarged and no tenderness, skin normal  HEMATOLOGIC/LYMPHATICS:  no cervical lymphadenopathy  BACK:  Symmetric, no curvature, spinous processes are non-tender on palpation, paraspinous muscles are non-tender on palpation, no costal vertebral tenderness  LUNGS:  No increased work of breathing, good air exchange, clear to auscultation bilaterally, no crackles or wheezing  CARDIOVASCULAR:  Normal apical impulse, regular rate and rhythm, normal S1 and S2, no S3 or S4, and no murmur noted  ABDOMEN: No previous incisions, pain right abdomen with guarding  MUSCULOSKELETAL:  There is no redness, warmth, or swelling of the joints. Full range of motion noted. Motor strength is 5 out of 5 all extremities bilaterally. Tone is normal.  NEUROLOGIC: Awake alert  SKIN:  no bruising or bleeding  DATA:    CBC:   Lab Results   Component Value Date/Time    WBC 15.5 07/19/2022 06:14 AM    RBC 3.37 07/19/2022 06:14 AM    HGB 9.3 07/19/2022 06:14 AM    HCT 28.6 07/19/2022 06:14 AM    MCV 84.8 07/19/2022 06:14 AM    MCH 27.5 07/19/2022 06:14 AM    MCHC 32.4 07/19/2022 06:14 AM    RDW 14.2 07/19/2022 06:14 AM     07/19/2022 06:14 AM     CMP:    Lab Results   Component Value Date/Time     07/19/2022 06:14 AM    K 3.2 07/19/2022 06:14 AM     07/19/2022 06:14 AM    CO2 26 07/19/2022 06:14 AM    BUN 12 07/19/2022 06:14 AM    CREATININE 0.61 07/19/2022 06:14 AM    GFRAA >60.0 07/19/2022 06:14 AM    LABGLOM >60.0 07/19/2022 06:14 AM    GLUCOSE 113 07/19/2022 06:14 AM    PROT 5.8 07/19/2022 06:14 AM    LABALBU 2.3 07/19/2022 06:14 AM    CALCIUM 8.3 07/19/2022 06:14 AM    BILITOT 0.6 07/19/2022 06:14 AM    ALKPHOS 56 07/19/2022 06:14 AM    AST 27 07/19/2022 06:14 AM    ALT 27 07/19/2022 06:14 AM     Radiology Review: CAT scan reviewed    IMPRESSION/RECOMMENDATIONS:      Abdominal abscess. Difficult to assess source but possibilities include appendix. Patient given advanced age and comorbidities, makes her a guarded operative candidate. I will ask interventional radiology to see her regarding possible drainage procedure. I will make further clinical decisions based on course as well as interventional radiology recommendations.

## 2022-07-19 NOTE — H&P
[place] [address] MEDICINE    HISTORY AND PHYSICAL EXAM    PATIENT NAME:  Chikis Mariscal    MRN:  [id number]  SERVICE DATE:  7/18/2022   SERVICE TIME:  8:55 PM    Primary Care Physician: Ceasar Faria MD     SUBJECTIVE  CHIEF COMPLAINT:  Abdominal Pain       HPI: Chikis Mariscal is a 80 y.o. female with a past medical history of with past medical history significant for AAA, Charcot-Arleen-Tooth muscular atrophy, CHF, depression, hypertension, lumbar stenosis, myelopathy, OA  that is being admitted for Abdominal Pain  . Patient is a [place] speaking patient, unable to use translation device in the [place] language. Called son for information. Per son the patient had abdominal pain for a week. She has also had dizziness, and greenish diarrhea. She refused to go to the doctor. Finally he came and visited her today and she was in so much pain that he called an ambulance and brought her in. The sister was in town recently and watching mom while brother was out of town sister stated there was no changes to her behaviors, food intake, or pain. Patient lives alone. She is independent but does not drive. Her son lives 8 houses away. She gets help from family. She does not drink smoke or do drugs. She would like to be a full code.     HPI    PAST MEDICAL HISTORY:    Past Medical History:   Diagnosis Date    Ascending aortic aneurysm ([address] 6/23/2017    Charcot Arleen Tooth muscular atrophy     CHF (congestive heart failure) (HCC)     Chronic diastolic CHF (congestive heart failure) ([address] 4/1/2022    Depression     Descending aortic aneurysm ([address] 6/23/2017    Essential hypertension 2/16/2018    Headache     HTN (hypertension)     Impaired mobility and activities of daily living     Lumbar stenosis with neurogenic claudication     Myelopathy ([address]     Osteoarthritis      PAST SURGICAL HISTORY:    Past Surgical History:   Procedure Laterality Date    JOINT REPLACEMENT Bilateral     knees    PLEURAL CATH INSERTION Left 2/25/2021    LEFT PLEURAL CATHETER INSERTION [facility] Left 01/29/2021    total of 755 cc removed per Dr Cerda specimen sent to lab     FAMILY HISTORY:    Family History   Problem Relation Age of Onset    Arthritis Mother     Arthritis Father     High Blood Pressure Father      SOCIAL HISTORY:    Social History     Socioeconomic History    Marital status:       Spouse name: Not on file    Number of children: Not on file    Years of education: Not on file    Highest education level: Not on file   Occupational History    Not on file   Tobacco Use    Smoking status: Never    Smokeless tobacco: Never   Substance and Sexual Activity    Alcohol use: No     Alcohol/week: 0.0 standard drinks    Drug use: No    Sexual activity: Not on file   Other Topics Concern    Not on file   Social History Narrative    Lives With: Alone, son lives down the street, dtr is in the area    Has a son in the area    Type of Home: [address]: One level    Home Access: Stairs to enter with rails- Number of Steps: 2- Rails: Both    Bathroom Shower/Tub: Tub/Shower unit, Bathroom Equipment: Grab bars in shower, Shower chair    Home Equipment: Rolling walker, Cane(Pt infrequemtly uses DME for ambulation and prefers to furniture walk in home)    ADL Assistance: Independent, [address]: Independent    Homemaking Responsibilities: Yes    Ambulation Assistance: Independent, Transfer Assistance: Independent    Additional Comments: Son stops over 2 times daily         Social Determinants of Health     Financial Resource Strain: Not on file   Food Insecurity: Not on file   Transportation Needs: Not on file   Physical Activity: Not on file   Stress: Not on file   Social Connections: Not on file   Intimate Partner Violence: Not on file   Housing Stability: Not on file     MEDICATIONS:   Prior to Admission medications    Medication Sig Start Date End Date Taking? Authorizing Provider   potassium chloride (KLOR-CON M20) 20 MEQ extended release tablet TAKE 1 TABLET DAILY WITH BREAKFAST (MUST CALL OFFICE FOR FOLLOW UP) 7/18/22   ADRIENNE Dahl CNP   digoxin (LANOXIN) 125 MCG tablet TAKE 1 TABLET DAILY 4/4/22   ADRIENNE Dahl CNP   furosemide (LASIX) 20 MG tablet TAKE 1 TABLET DAILY 4/4/22   ADRIENNE Dahl CNP   amLODIPine (NORVASC) 5 MG tablet Take 1 tablet by mouth daily 4/1/22   Jorge Grimesiday,    pantoprazole (PROTONIX) 40 MG tablet TAKE 1 TABLET DAILY 2/11/22   ADRIENNE Hyman CNP   carvedilol (COREG) 6.25 MG tablet TAKE 1 TABLET TWICE A DAY 1/17/22   ADRIENNE Colvin CNP   XARELTO 15 MG TABS tablet TAKE 1 TABLET DAILY 1/3/22   ADRIENNE Hyman CNP   nitroGLYCERIN (NITROSTAT) 0.4 MG SL tablet up to max of 3 total doses.  If no relief after 1 dose, call 911. 3/1/21   Farhad Given, APRN - NP   budesonide-formoterol [facility]) 160-4.5 MCG/ACT AERO Inhale 2 puffs into the lungs 2 times daily 3/1/21   Farhad Given, APRN - NP   ferrous sulfate (IRON 325) 325 (65 Fe) MG tablet Take 1 tablet by mouth 2 times daily (with meals) 3/1/21   Farhad Given, APRN - NP   simethicone (MYLICON) 80 MG chewable tablet Take 1 tablet by mouth every 6 hours as needed for Flatulence 3/1/21   Farhad Given, APRN - NP   Carboxymethylcellulose Sodium (EYE DROPS OP) Apply 1 drop to eye as needed (Dry eyes) Indications: Systane     Historical Provider, MD   Boswellia-Glucosamine-Vit D (OSTEO BI-FLEX ONE PER DAY) TABS Take by mouth daily    Historical Provider, MD   Calcium Carbonate-Vitamin D (CALTRATE 600+D PO) Take by mouth daily    Historical Provider, MD   Multiple Vitamins-Minerals (CENTRUM SILVER ULTRA WOMENS) TABS Take by mouth daily    Historical Provider, MD   vitamin B-12 (CYANOCOBALAMIN) 1000 MCG tablet Take 1,000 mcg by mouth daily    Historical Provider, MD   aspirin 81 MG tablet Take 81 mg by mouth daily posterior oropharyngeal erythema. Eyes:      General: Lids are normal. No visual field deficit or scleral icterus. Extraocular Movements: Extraocular movements intact. Right eye: No nystagmus. Left eye: No nystagmus. Conjunctiva/sclera: Conjunctivae normal.   Neck:      Thyroid: No thyromegaly. Vascular: No JVD. Trachea: Trachea and phonation normal.   Cardiovascular:      Rate and Rhythm: Normal rate and regular rhythm. Pulses: Normal pulses. Radial pulses are 2+ on the right side and 2+ on the left side. Dorsalis pedis pulses are 2+ on the right side and 2+ on the left side. Heart sounds: Normal heart sounds, S1 normal and S2 normal. No murmur heard. Pulmonary:      Effort: Pulmonary effort is normal. No respiratory distress. Breath sounds: Normal breath sounds and air entry. No stridor or decreased air movement. No decreased breath sounds, wheezing, rhonchi or rales. Chest:      Chest wall: No tenderness. Abdominal:      General: Abdomen is flat. Bowel sounds are normal. There is no distension. Palpations: Abdomen is soft. Tenderness: There is abdominal tenderness in the right upper quadrant. There is no guarding. Positive signs include Asencio's sign. Negative signs include McBurney's sign. Musculoskeletal:         General: No swelling, tenderness, deformity or signs of injury. Normal range of motion. Cervical back: Normal range of motion and neck supple. No rigidity or tenderness. Right lower leg: No edema. Left lower leg: No edema. Feet:      Right foot:      Skin integrity: Skin integrity normal.      Left foot:      Skin integrity: Skin integrity normal.   Skin:     General: Skin is warm and dry. Capillary Refill: Capillary refill takes less than 2 seconds. Coloration: Skin is not jaundiced or pale. Findings: No bruising, erythema, lesion or rash. Nails: There is no clubbing.    Neurological: General: No focal deficit present. Mental Status: She is alert and oriented to person, place, and time. Mental status is at baseline. Cranial Nerves: Cranial nerves are intact. Sensory: Sensation is intact. Motor: Motor function is intact. No weakness. Psychiatric:         Attention and Perception: Attention and perception normal.         Mood and Affect: Mood and affect normal.         Speech: Speech normal.         Behavior: Behavior normal. Behavior is cooperative. Thought Content: Thought content normal.         Cognition and Memory: Cognition and memory normal.         Judgment: Judgment normal.     Neurologic Exam     Mental Status   Oriented to person, place, and time. Speech: speech is normal      DATA:     Diagnostic tests reviewed for today's visit:    Most recent labs and imaging results reviewed.      LABS:    Recent Results (from the past 24 hour(s))   Comprehensive Metabolic Panel    Collection Time: 07/18/22 12:30 PM   Result Value Ref Range    Sodium 130 (L) 135 - 144 mEq/L    Potassium 3.1 (L) 3.4 - 4.9 mEq/L    Chloride 95 95 - 107 mEq/L    CO2 26 20 - 31 mEq/L    Anion Gap 9 9 - 15 mEq/L    Glucose 170 (H) 70 - 99 mg/dL    BUN 12 8 - 23 mg/dL    CREATININE 0.57 0.50 - 0.90 mg/dL    GFR Non-African American >60.0 >60    GFR  >60.0 >60    Calcium 8.8 8.5 - 9.9 mg/dL    Total Protein 6.3 6.3 - 8.0 g/dL    Albumin 3.0 (L) 3.5 - 4.6 g/dL    Total Bilirubin 0.5 0.2 - 0.7 mg/dL    Alkaline Phosphatase 66 40 - 130 U/L    ALT 29 0 - 33 U/L    AST 27 0 - 35 U/L    Globulin 3.3 2.3 - 3.5 g/dL   CBC with Auto Differential    Collection Time: 07/18/22 12:30 PM   Result Value Ref Range    WBC 15.0 (H) 4.8 - 10.8 K/uL    RBC 3.73 (L) 4.20 - 5.40 M/uL    Hemoglobin 10.1 (L) 12.0 - 16.0 g/dL    Hematocrit 31.4 (L) 37.0 - 47.0 %    MCV 84.2 82.0 - 100.0 fL    MCH 27.2 27.0 - 31.3 pg    MCHC 32.3 (L) 33.0 - 37.0 %    RDW 14.1 11.5 - 14.5 %    Platelets 589 325 - 458 K/uL    Neutrophils % 88.6 %    Lymphocytes % 3.8 %    Monocytes % 7.0 %    Eosinophils % 0.4 %    Basophils % 0.2 %    Neutrophils Absolute 13.3 (H) 1.4 - 6.5 K/uL    Lymphocytes Absolute 0.6 (L) 1.0 - 4.8 K/uL    Monocytes Absolute 1.1 (H) 0.2 - 0.8 K/uL    Eosinophils Absolute 0.1 0.0 - 0.7 K/uL    Basophils Absolute 0.0 0.0 - 0.2 K/uL   Lactic Acid    Collection Time: 07/18/22 12:30 PM   Result Value Ref Range    Lactic Acid 1.2 0.5 - 2.2 mmol/L   Lipase    Collection Time: 07/18/22 12:30 PM   Result Value Ref Range    Lipase 14 12 - 95 U/L   Troponin    Collection Time: 07/18/22 12:30 PM   Result Value Ref Range    Troponin <0.010 0.000 - 0.010 ng/mL   CK    Collection Time: 07/18/22 12:30 PM   Result Value Ref Range    Total CK 52 0 - 170 U/L   EKG 12 Lead - Chest Pain    Collection Time: 07/18/22 12:45 PM   Result Value Ref Range    Ventricular Rate 83 BPM    Atrial Rate 78 BPM    QRS Duration 152 ms    Q-T Interval 422 ms    QTc Calculation (Bazett) 495 ms    R Axis -64 degrees    T Axis 13 degrees   POCT CREATININE    Collection Time: 07/18/22  1:00 PM   Result Value Ref Range    POC CREATININE WHOLE BLOOD 0.5        IMAGING:   CT ABDOMEN PELVIS WO CONTRAST Additional Contrast? Oral    Result Date: 7/18/2022  Complex 10.0 x 8.0 x 10.6 cm mass with septated central low attenuation fluid collections as discussed. Mass displaces cecum and ascending colon anteriorly. Differential includes malignancy including appendiceal mucocele/carcinoma, as well as retroperitoneal sarcoma with central necrotic change. Infection/abscess secondary consideration, minimal presence of adjacent inflammatory change. 8.0 x 5.8 x 7.5 cm left adnexal cysts as discussed, most likely ovarian in etiology. If clinical concern warrants, pelvic sonography may be obtained for further evaluation. Other findings discussed.  All CT scans at this facility use dose modulation, iterative reconstruction, and/or weight based dosing when appropriate to reduce radiation dose to as low as reasonably achievable. CT Head WO Contrast    Result Date: 7/18/2022  1. Age-appropriate cortical atrophy and periventricular microangiopathy. When compared to previous study there has been no significant interval change. 2. No acute hemorrhage or extra-axial fluid collection All CT scans at this facility use dose modulation, iterative reconstruction, and/or weight based dosing when appropriate to reduce radiation dose to as low as reasonably achievable. CT ABDOMEN PELVIS W IV CONTRAST Additional Contrast? None    Result Date: 7/18/2022  Impression: 1. In the right lower quadrant of the abdomen, pericolic abscesses versus pericolic mass is seen. This is thought more likely abscess. 2. A cystic mass is again seen in the pelvis and is likely in the ovary. It is unchanged from previous examination The patient will be given oral contrast and rescanned to evaluate the right lower quadrant. All CT scans at this facility use dose modulation, iterative reconstruction, and/or weight based dosing        VTE Prophylaxis: patient on xarelto, hold until seen by GI.      ASSESSMENT AND PLAN    Principal Problem:      Abdominal pain - mass vs abscess - 1 week right upper quadrant abdominal pain with diarrhea. On CT abdomen, mass found, CT T abdomen pelvis with contrast shows right lower quadrant of the abdomen. Colic abscess versus pericolic mass. Plan: admit, consult general surgery, NPO until seen by surgery, hold xarelto tonight until seen by surgery, toradol for pain, zosyn, send blood cultures. Active Problems:      A-fib ([address] -patient in A. fib on EKG, takes Xarelto, beta-blocker, digoxin. Denies palpitations. Blood thinners on hold until cleared by surgery. Plan: Hold Xarelto until seen by surgery, telemetry monitoring. Hyponatremia -sodium 130 in ED, patient on IV fluids, no confusion noted. Plan: We will recheck to determine if IV fluids are needed. Hypokalemia -potassium 3.1 in ED, denies chest pain or palpitations, patient in A. fib, no peaked T waves, patient was on IV fluids from ED, at 125 an hour. Plan: We will recheck potassium level now to determine if there is a need to replenish potassium now, will replenish to maintain potassium at 4.0. Anemia -hemoglobin 10.1 hematocrit 31.4. Patient is chronically low, normocytic, Patient takes iron, vitamin D, B12, multivitamin. Plan: Will resume home meds, as tolerated, when home med list is completed by nursing. Essential hypertension -patient is on amlodipine Coreg, and home. Blood pressure 109/69. Denies blurred vision or chest pain. Plan: Will resume home meds, as tolerated, when home med list is completed by nursing. CHF (congestive heart failure) ([facility] -patient on Lasix, with potassium, digoxin, last echo shows EF of 60%. Patient has no ankle edema noted. Plan: Will resume home meds, as tolerated, when home med list is completed by nursing, monitor for fluid overload, gentle hydration. Hypothyroid -takes Synthroid at home, last TSH was 2.69 on 3/17/2022. Plan: Will resume home meds, as tolerated, when home med list is completed by nursing.       Plan of care discussed with: patient and adult child    SIGNATURE: ADRIENNE Alvarez - CNP  DATE: July 18, 2022  TIME: 8:55 PM     Radha Chavez MD - supervising physician

## 2022-07-20 LAB
ALBUMIN SERPL-MCNC: 2.4 G/DL (ref 3.5–4.6)
ALP BLD-CCNC: 56 U/L (ref 40–130)
ALT SERPL-CCNC: 26 U/L (ref 0–33)
ANION GAP SERPL CALCULATED.3IONS-SCNC: 12 MEQ/L (ref 9–15)
ANION GAP SERPL CALCULATED.3IONS-SCNC: 9 MEQ/L (ref 9–15)
AST SERPL-CCNC: 25 U/L (ref 0–35)
BASOPHILS ABSOLUTE: 0 K/UL (ref 0–0.2)
BASOPHILS RELATIVE PERCENT: 0.2 %
BILIRUB SERPL-MCNC: 0.5 MG/DL (ref 0.2–0.7)
BUN BLDV-MCNC: 14 MG/DL (ref 8–23)
BUN BLDV-MCNC: 15 MG/DL (ref 8–23)
CA 125: 75 U/ML
CALCIUM SERPL-MCNC: 8.5 MG/DL (ref 8.5–9.9)
CALCIUM SERPL-MCNC: 8.6 MG/DL (ref 8.5–9.9)
CARCINOEMBRYONIC ANTIGEN: 1.4 NG/ML
CHLORIDE BLD-SCNC: 102 MEQ/L (ref 95–107)
CHLORIDE BLD-SCNC: 104 MEQ/L (ref 95–107)
CO2: 25 MEQ/L (ref 20–31)
CO2: 26 MEQ/L (ref 20–31)
CREAT SERPL-MCNC: 0.57 MG/DL (ref 0.5–0.9)
CREAT SERPL-MCNC: 0.63 MG/DL (ref 0.5–0.9)
EOSINOPHILS ABSOLUTE: 0.2 K/UL (ref 0–0.7)
EOSINOPHILS RELATIVE PERCENT: 1.4 %
GFR AFRICAN AMERICAN: >60
GFR NON-AFRICAN AMERICAN: >60
GLOBULIN: 3.5 G/DL (ref 2.3–3.5)
GLUCOSE BLD-MCNC: 114 MG/DL (ref 70–99)
GLUCOSE BLD-MCNC: 128 MG/DL (ref 70–99)
HCT VFR BLD CALC: 28.4 % (ref 37–47)
HEMOGLOBIN: 9.4 G/DL (ref 12–16)
LYMPHOCYTES ABSOLUTE: 0.7 K/UL (ref 1–4.8)
LYMPHOCYTES RELATIVE PERCENT: 4.3 %
MAGNESIUM: 1.8 MG/DL (ref 1.7–2.4)
MCH RBC QN AUTO: 27.8 PG (ref 27–31.3)
MCHC RBC AUTO-ENTMCNC: 33 % (ref 33–37)
MCV RBC AUTO: 84.1 FL (ref 82–100)
MONOCYTES ABSOLUTE: 1.1 K/UL (ref 0.2–0.8)
MONOCYTES RELATIVE PERCENT: 7.3 %
NEUTROPHILS ABSOLUTE: 13.3 K/UL (ref 1.4–6.5)
NEUTROPHILS RELATIVE PERCENT: 86.8 %
PDW BLD-RTO: 14.5 % (ref 11.5–14.5)
PERFORMED ON: ABNORMAL
PLATELET # BLD: 328 K/UL (ref 130–400)
POC CREATININE: 0.5 MG/DL (ref 0.6–1.2)
POC SAMPLE TYPE: ABNORMAL
POTASSIUM REFLEX MAGNESIUM: 3.9 MEQ/L (ref 3.4–4.9)
POTASSIUM SERPL-SCNC: 2.9 MEQ/L (ref 3.4–4.9)
RBC # BLD: 3.38 M/UL (ref 4.2–5.4)
SODIUM BLD-SCNC: 139 MEQ/L (ref 135–144)
SODIUM BLD-SCNC: 139 MEQ/L (ref 135–144)
TOTAL PROTEIN: 5.9 G/DL (ref 6.3–8)
URINE CULTURE, ROUTINE: NORMAL
WBC # BLD: 15.3 K/UL (ref 4.8–10.8)

## 2022-07-20 PROCEDURE — 6360000002 HC RX W HCPCS

## 2022-07-20 PROCEDURE — 2500000003 HC RX 250 WO HCPCS: Performed by: INTERNAL MEDICINE

## 2022-07-20 PROCEDURE — 1210000000 HC MED SURG R&B

## 2022-07-20 PROCEDURE — 80053 COMPREHEN METABOLIC PANEL: CPT

## 2022-07-20 PROCEDURE — 2700000000 HC OXYGEN THERAPY PER DAY

## 2022-07-20 PROCEDURE — 6370000000 HC RX 637 (ALT 250 FOR IP): Performed by: INTERNAL MEDICINE

## 2022-07-20 PROCEDURE — 83735 ASSAY OF MAGNESIUM: CPT

## 2022-07-20 PROCEDURE — 99223 1ST HOSP IP/OBS HIGH 75: CPT | Performed by: INTERNAL MEDICINE

## 2022-07-20 PROCEDURE — 85025 COMPLETE CBC W/AUTO DIFF WBC: CPT

## 2022-07-20 PROCEDURE — 6360000002 HC RX W HCPCS: Performed by: INTERNAL MEDICINE

## 2022-07-20 PROCEDURE — 36415 COLL VENOUS BLD VENIPUNCTURE: CPT

## 2022-07-20 PROCEDURE — 2580000003 HC RX 258

## 2022-07-20 RX ORDER — POTASSIUM CHLORIDE 7.45 MG/ML
10 INJECTION INTRAVENOUS
Status: COMPLETED | OUTPATIENT
Start: 2022-07-20 | End: 2022-07-20

## 2022-07-20 RX ORDER — METOPROLOL TARTRATE 5 MG/5ML
5 INJECTION INTRAVENOUS EVERY 6 HOURS
Status: DISCONTINUED | OUTPATIENT
Start: 2022-07-20 | End: 2022-07-22 | Stop reason: HOSPADM

## 2022-07-20 RX ORDER — POTASSIUM CHLORIDE 20 MEQ/1
40 TABLET, EXTENDED RELEASE ORAL ONCE
Status: COMPLETED | OUTPATIENT
Start: 2022-07-20 | End: 2022-07-20

## 2022-07-20 RX ADMIN — POTASSIUM CHLORIDE 40 MEQ: 1500 TABLET, EXTENDED RELEASE ORAL at 02:51

## 2022-07-20 RX ADMIN — CITALOPRAM HYDROBROMIDE 20 MG: 10 TABLET ORAL at 08:40

## 2022-07-20 RX ADMIN — METOPROLOL TARTRATE 5 MG: 1 INJECTION, SOLUTION INTRAVENOUS at 16:49

## 2022-07-20 RX ADMIN — POTASSIUM CHLORIDE 10 MEQ: 7.46 INJECTION, SOLUTION INTRAVENOUS at 06:26

## 2022-07-20 RX ADMIN — Medication 10 ML: at 21:30

## 2022-07-20 RX ADMIN — POTASSIUM CHLORIDE 10 MEQ: 7.46 INJECTION, SOLUTION INTRAVENOUS at 04:07

## 2022-07-20 RX ADMIN — PIPERACILLIN AND TAZOBACTAM 3375 MG: 3; .375 INJECTION, POWDER, LYOPHILIZED, FOR SOLUTION INTRAVENOUS at 08:30

## 2022-07-20 RX ADMIN — LEVOTHYROXINE SODIUM 88 MCG: 88 TABLET ORAL at 08:40

## 2022-07-20 RX ADMIN — Medication 10 ML: at 08:40

## 2022-07-20 RX ADMIN — PIPERACILLIN AND TAZOBACTAM 3375 MG: 3; .375 INJECTION, POWDER, LYOPHILIZED, FOR SOLUTION INTRAVENOUS at 00:30

## 2022-07-20 RX ADMIN — POTASSIUM CHLORIDE 10 MEQ: 7.46 INJECTION, SOLUTION INTRAVENOUS at 05:10

## 2022-07-20 RX ADMIN — POTASSIUM CHLORIDE 10 MEQ: 7.46 INJECTION, SOLUTION INTRAVENOUS at 03:06

## 2022-07-20 RX ADMIN — PIPERACILLIN AND TAZOBACTAM 3375 MG: 3; .375 INJECTION, POWDER, LYOPHILIZED, FOR SOLUTION INTRAVENOUS at 16:55

## 2022-07-20 ASSESSMENT — ENCOUNTER SYMPTOMS
NAUSEA: 0
STRIDOR: 0
BLOOD IN STOOL: 0
RESPIRATORY NEGATIVE: 1
WHEEZING: 0
COUGH: 0
ABDOMINAL PAIN: 1
EYES NEGATIVE: 1
CHEST TIGHTNESS: 0
SHORTNESS OF BREATH: 0

## 2022-07-20 ASSESSMENT — PAIN SCALES - GENERAL
PAINLEVEL_OUTOF10: 0
PAINLEVEL_OUTOF10: 0

## 2022-07-20 NOTE — PROGRESS NOTES
[facility] Occupational Therapy      Date: 2022  Patient Name: Murphy Diez        MRN: [id number]  Account: [de-identified]   : 1932  (80 y.o.)  Room: [place] [address]    Chart reviewed, attempted OT at  for eval. Patient not seen 2° to:    Pt. declined, stating: \"I peeing every five minutes. \" Pt very [place] and also speaks only [place]. Reports fatigue, declines OT eval.    Spoke to RN, RN aware. Will attempt again when able.     Electronically signed by OSMANI Vasques/L on 2022 at 3:35 PM

## 2022-07-20 NOTE — CARE COORDINATION
THIS LSW MET WITH PATIENT, SON AT BEDSIDE THIS AM. PATIENT RESTING COMFORTABLY. AT THIS TIME PATIENT IS BEING CONSULTED FOR SURGERY. D/C PLAN: HOME WITH [facility] IF POSSIBLE. PATIENT AND SON ALSO CONSIDERING [facility] REHAB.     JANETW / SWATHI TO FOLLOW.   Electronically signed by KIN Ellis LSW on 7/20/22 at 10:55 AM EDT

## 2022-07-20 NOTE — CONSULTS
Inpatient consult to Cardiology  Consult performed by: Blue Shane MD  Consult ordered by: Madhu Fournier MD        Patient Name: Kavitha Choe  Admit Date: 2022 12:14 PM  MR #: [id number]  : 1932    Attending Physician: Madhu Fournier MD  Reason for consult: Arrhythmia    History of Presenting Illness:      Kavitha Choe is a 80 y.o. female on hospital day 2 with a history of . History Obtained From:  patient, electronic medical record    Pt admitted with abd pain. W/u thus far reveals a mass vs. Abscess. Pt does have PAF and Moderate CAD. And moderae Aortic aneurysm 4.9 cm ascending. LVE F60 w Mild PA HTN    Tele reveals SR at rest but she goes into Rapid AF with Aberrancy with any activity. Her PO BB and DIg has been held due to NPO status. She does not feel the AF    NNo CP nor SOB. She has mild Abd discomfort. Discussed with son as well.      History:      EKG: SR  Past Medical History:   Diagnosis Date    Ascending aortic aneurysm ([address] 2017    Charcot Arleen Tooth muscular atrophy     CHF (congestive heart failure) ([facility])     Chronic diastolic CHF (congestive heart failure) ([address] 2022    Depression     Descending aortic aneurysm ([address] 2017    Essential hypertension 2018    Headache     HTN (hypertension)     Impaired mobility and activities of daily living     Lumbar stenosis with neurogenic claudication     Myelopathy ([address]     Osteoarthritis      Past Surgical History:   Procedure Laterality Date    JOINT REPLACEMENT Bilateral     knees    PLEURAL CATH INSERTION Left 2021    LEFT PLEURAL CATHETER INSERTION performed by Letha Garza MD at [place] [address] Left 2021    total of 755 cc removed per Dr Neelam Ho specimen sent to lab       Family History  Family History   Problem Relation Age of Onset    Arthritis Mother     Arthritis Father     High Blood Pressure Father      [] Unable to obtain due to ventilated and/ or neurologic status    Social History     Socioeconomic History    Marital status:       Spouse name: Not on file    Number of children: Not on file    Years of education: Not on file    Highest education level: Not on file   Occupational History    Not on file   Tobacco Use    Smoking status: Never    Smokeless tobacco: Never   Substance and Sexual Activity    Alcohol use: No     Alcohol/week: 0.0 standard drinks    Drug use: No    Sexual activity: Not on file   Other Topics Concern    Not on file   Social History Narrative    Lives With: Alone, son lives down the street, dtr is in the area    Has a son in the area    Type of Home: [address]: One level    Home Access: Stairs to enter with rails- Number of Steps: 2- Rails: Both    Bathroom Shower/Tub: Tub/Shower unit, Bathroom Equipment: Grab bars in shower, Shower chair    Home Equipment: Rolling walker, Cane(Pt infrequemtly uses DME for ambulation and prefers to furniture walk in home)    ADL Assistance: Independent, [address]: [address] Responsibilities: Yes    Ambulation Assistance: Independent, Transfer Assistance: Independent    Additional Comments: Son stops over 2 times daily         Social Determinants of Health     Financial Resource Strain: Not on file   Food Insecurity: Not on file   Transportation Needs: Not on file   Physical Activity: Not on file   Stress: Not on file   Social Connections: Not on file   Intimate Partner Violence: Not on file   Housing Stability: Not on file      [] Unable to obtain due to ventilated and/ or neurologic status      Home Medications:      Medications Prior to Admission: [DISCONTINUED] potassium chloride (KLOR-CON M20) 20 MEQ extended release tablet, TAKE 1 TABLET DAILY WITH BREAKFAST (MUST CALL OFFICE FOR FOLLOW UP) (Patient not taking: Reported on 7/19/2022)  digoxin (LANOXIN) 125 MCG tablet, TAKE 1 TABLET DAILY  furosemide (LASIX) 20 MG tablet, TAKE 1 TABLET IntraVENous Q8H     Continuous Infusions:   sodium chloride       PRN Meds:.sodium chloride flush, sodium chloride, ondansetron **OR** ondansetron, polyethylene glycol, acetaminophen **OR** acetaminophen, ketorolac  .   sodium chloride          Allergies: Allergies   Allergen Reactions    Latex     Tape Miles Erik Tape]         Review of Systems:       Review of Systems   Constitutional: Negative. Negative for diaphoresis and fatigue. HENT: Negative. Eyes: Negative. Respiratory: Negative. Negative for cough, chest tightness, shortness of breath, wheezing and stridor. Cardiovascular: Negative. Negative for chest pain, palpitations and leg swelling. Gastrointestinal:  Positive for abdominal pain. Negative for blood in stool and nausea. Genitourinary: Negative. Musculoskeletal: Negative. Skin: Negative. Neurological: Negative. Negative for dizziness, syncope, weakness and light-headedness. Hematological: Negative. Psychiatric/Behavioral: Negative. Objective Findings:     Vitals:/65   Pulse 76   Temp 98.2 °F (36.8 °C) (Oral)   Resp 18   Ht 5' 6\" (1.676 m)   Wt 161 lb 8 oz (73.3 kg)   SpO2 94%   BMI 26.07 kg/m²      Physical Examination:    Physical Exam   Constitutional: She appears healthy. No distress. HENT:   Normal cephalic and Atraumatic   Eyes: Pupils are equal, round, and reactive to light. Neck: Thyroid normal. No JVD present. No neck adenopathy. No thyromegaly present. Cardiovascular: Normal rate, regular rhythm, intact distal pulses and normal pulses. Murmur heard. Pulmonary/Chest: Effort normal and breath sounds normal. She has no wheezes. She has no rales. She exhibits no tenderness. Abdominal: Soft. Bowel sounds are normal. There is no abdominal tenderness. Musculoskeletal:         General: No tenderness or edema. Normal range of motion. Cervical back: Normal range of motion and neck supple.    Neurological: She is alert and oriented to person, place, and time. Skin: Skin is warm. No cyanosis. Nails show no clubbing.        Results/ Medications reviewed 7/20/2022, 4:27 PM     Laboratory, Microbiology, Pathology, Radiology, Cardiology, Medications and Transcriptions reviewed  Scheduled Meds:   metoprolol  5 mg IntraVENous Q6H    [Held by provider] carvedilol  6.25 mg Oral BID    citalopram  20 mg Oral Daily    [Held by provider] digoxin  125 mcg Oral Daily    ferrous sulfate  325 mg Oral Every Other Day    levothyroxine  88 mcg Oral Daily    [Held by provider] rivaroxaban  15 mg Oral Daily    [Held by provider] amLODIPine  5 mg Oral Daily    morphine  4 mg IntraVENous Once    ondansetron  4 mg IntraVENous Once    sodium chloride flush  5-40 mL IntraVENous 2 times per day    piperacillin-tazobactam  3,375 mg IntraVENous Q8H     Continuous Infusions:   sodium chloride         Recent Labs     07/18/22  1230 07/19/22  0614 07/20/22  0628   WBC 15.0* 15.5* 15.3*   HGB 10.1* 9.3* 9.4*   HCT 31.4* 28.6* 28.4*   MCV 84.2 84.8 84.1    306 328     Recent Labs     07/19/22  0614 07/20/22  0119 07/20/22  0628    139 139   K 3.2* 2.9* 3.9    102 104   CO2 26 25 26   BUN 12 15 14   CREATININE 0.61 0.63 0.57     Recent Labs     07/18/22  1230 07/19/22  0614 07/20/22  0628   AST 27 27 25   ALT 29 27 26   BILITOT 0.5 0.6 0.5   ALKPHOS 66 56 56     Recent Labs     07/18/22  1230   LIPASE 14     Recent Labs     07/18/22  1230 07/19/22  0614 07/20/22  0628   PROT 6.3 5.8* 5.9*     ECHO Complete 2D W Doppler W Color    Result Date: 6/22/2022  Transthoracic Echocardiography Report (TTE)  Demographics   Patient Name   Keely Nolasco         Gender              Female                 Alan Fee A   Patient Number [id number]           Race                                                     Ethnicity   Visit Number   [id number]          Room Number   Corporate ID                      Date of Study       06/22/2022   Accession      [id number] Referring Physician Bridger Landaverde, DO  Number   Date of Birth  07/12/1932         Sonographer         Kitty Hay RD   Age            80 year(s)         Interpreting        [facility])                                    Physician           Cardiology                                                        Judy Franco MD  Procedure Type of Study   TTE procedure:ECHO COMPLETE 2D W/DOP W/COLOR. Procedure Date Date: 06/22/2022 Start: 10:48 AM Study Location: Echo Lab Technical Quality: Adequate visualization Indications: Aortic aneurysm. Patient Status: Routine Height: 60 inches Weight: 161 pounds BSA: 1.7 m^2 BMI: 31.44 kg/m^2 BP: 120/70 mmHg  Conclusions   Summary  Mitral annular calcification is present. 1+ MR  Normal tricuspid valve structure and function. 2+ TR  RVSP 47 mmHg  Normal left ventricle structure and function. Left ventricular ejection fraction is visually estimated at 60%. E/A flow reversal noted. Suggestive of diastolic dysfunction. Signature   ----------------------------------------------------------------  Electronically signed by Judy Franco MD(Interpreting  physician) on 06/22/2022 11:47 AM  ----------------------------------------------------------------   Findings  Left Ventricle Normal left ventricle structure and function. Left ventricular ejection fraction is visually estimated at 60%. E/A flow reversal noted. Suggestive of diastolic dysfunction. Right Ventricle Normal right ventricle structure and function. Normal right ventricle systolic pressure. Left Atrium Mildly dilated left atrium. Right Atrium Normal right atrium. Mitral Valve Mitral annular calcification is present. 1+ MR Tricuspid Valve Normal tricuspid valve structure and function. 2+ TR RVSP 47 mmHg Aortic Valve Aortic valve leaflets are mildly thickened. Pulmonic Valve The pulmonic valve was not well visualized . Pericardial Effusion No evidence of pericardial effusion. Pleural Effusion No evidence of pleural effusion. Aorta \ Miscellaneous The aorta is within normal limits. M-Mode Measurements (cm)   LVIDd: 4.46 cm                         LVIDs: 2.97 cm  IVSd: 1.36 cm                          IVSs: 1.9 cm  LVPWd: 0.79 cm                         LVPWs: 1.54 cm  Rt. Vent.  Dimension: 4.28 cm           AO Root Dimension: 4.91 cm                                         ACS: 1.27 cm                                         LVOT: 2.12 cm  Doppler Measurements:   AV Velocity:0.03 m/s                   MV Peak E-Wave: 0.91 m/s  AV Peak Gradient: 9.97 mmHg            MV Peak A-Wave: 0.98 m/s  AV Mean Gradient: 4.69 mmHg  AV Area (Continuity):2.6 cm^2          MV P1/2t: 110.9 msec  TR Velocity:3.07 m/s                   MVA by PHT1.98 cm^2  TR Gradient:37.74 mmHg                 Estimated RAP:10 mmHg                                         RVSP:47.74 mmHg  Valves  Mitral Valve   Peak E-Wave: 0.91 m/s                 Peak A-Wave: 0.98 m/s  P1/2t: 110.9 msec                     E/A Ratio: 0.93  Mean Velocity: 0.63 m/s               Peak Gradient: 3.31 mmHg  Mean Gradient: 1.89 mmHg              Deceleration Time: 171.8 msec  Area (PHT): 1.98 cm^2                 Area (continuity): 2.17 cm^2  MR Velocity: 4.75 m/s   Tissue Doppler   E' Septal Velocity: 0.04 m/s  E' Lateral Velocity: 0.06 m/s   Aortic Valve   Peak Velocity: 1.58 m/s               Mean Velocity: 0.99 m/s  Peak Gradient: 9.97 mmHg              Mean Gradient: 4.69 mmHg  Area (continuity): 2.6 cm^2  AV VTI: 35.79 cm   Cusp Separation: 1.27 cm   Tricuspid Valve   Estimated RVSP: 47.74 mmHg              Estimated RAP: 10 mmHg  TR Velocity: 3.07 m/s                   TR Gradient: 37.74 mmHg   Pulmonic Valve   Peak Velocity: 0.85 m/s           Peak Gradient: 2.9 mmHg                                    Estimated PASP: 47.74 mmHg   LVOT   Peak Velocity: 0.99 m/s              Mean Velocity: 0.71 m/s  Peak Gradient: 3.93 mmHg             Mean Gradient: 2.17 mmHg  LVOT Diameter: 2.12 cm Normal in caliber. See \"peritoneum \". Appendix:  Not visualized. Colon:  Normal in caliber. See \"peritoneum \". Peritoneum:  No free air. A bilobed, 10.0 x 8.0 x 10.6 cm mass having central rounded septated areas of low attenuation, 3.7 x 3.5 x 5.4 cm, and displacing cecum and ascending colon anteriorly (series 2, image 50, series 601, image 54, series 602, image 36). No calcification identified. Trace adjacent fat stranding demonstrated. Vessels: Aorta normal in course and caliber. Lymph nodes:  Retroperitoneal:  No enlarged retroperitoneal lymph nodes. Mesenteric:  No enlarged mesenteric lymph nodes. Pelvic: No enlarged pelvic lymph nodes. Ureters: Normal in course and caliber. No calcifications. Bladder: No wall thickening. Reproductive organs: 8.0 x 5.8 x 7.5 cm left adnexal cyst displacing urinary bladder right and laterally, anteriorly, and inferiorly (series 2, image 68, series 601, image 42, series 602, image 52). Abdominal Wall: Fat identified bilateral inguinal canals. Bones:  No bone lesions. Multilevel disc space narrowing lumbar spine. No post operative changes. Complex 10.0 x 8.0 x 10.6 cm mass with septated central low attenuation fluid collections as discussed. Mass displaces cecum and ascending colon anteriorly. Differential includes malignancy including appendiceal mucocele/carcinoma, as well as retroperitoneal sarcoma with central necrotic change. Infection/abscess secondary consideration, minimal presence of adjacent inflammatory change. 8.0 x 5.8 x 7.5 cm left adnexal cysts as discussed, most likely ovarian in etiology. If clinical concern warrants, pelvic sonography may be obtained for further evaluation. Other findings discussed. All CT scans at this facility use dose modulation, iterative reconstruction, and/or weight based dosing when appropriate to reduce radiation dose to as low as reasonably achievable.      CT Head WO Contrast    Result Date: 7/18/2022  CT OF THE BRAIN WITHOUT CONTRAST HISTORY: Nuha Montenegro is a Female of 80 years age with history of headache and dizziness. COMPARISON: July 19, 2017 TECHNIQUE:  Spiral CT examination of the brain was performed without intravenous contrast.  Images were obtained from the base of the skull up to the convexities in axial slices, and then examined in the blood, brain parenchymal and bony windows. FINDINGS:  No acute changes are noted. There is no evidence for mass, mass effect or midline shift. No abnormal extra-axial fluid collections are appreciated. Age-appropriate cortical volume loss is seen. There are patchy areas of hypoattenuation in a sub-cortical, central white and periventricular distribution consistent with nonspecific ischemic small vessel disease. Calcifications are again seen in the basal ganglia. There are no acute parenchymal alterations, blood byproducts or abnormal extracerebral fluid. The calvarium is grossly intact. The visualized paranasal sinuses show mucoceles or polyps in the maxillary sinuses. Mild fluid is seen in the mastoid air cells that is not significantly changed and is consistent with chronic changes. Mikayla Boss 1.Age-appropriate cortical atrophy and periventricular microangiopathy. When compared to previous study there has been no significant interval change. 2. No acute hemorrhage or extra-axial fluid collection All CT scans at this facility use dose modulation, iterative reconstruction, and/or weight based dosing when appropriate to reduce radiation dose to as low as reasonably achievable. CT CHEST W CONTRAST    Result Date: 6/22/2022  CT of the Chest with intravenous contrast medium. HISTORY: Ascending aortic aneurysm. TECHNICAL FACTORS: CT imaging of the chest was obtained and formatted as 5 mm contiguous axial images from the thoracic inlet through the adrenal glands. Sagittal and coronal reconstructions obtained during postprocessing.  Intravenous contrast medium:  Isovue-300, 50 mL Comparison:  CT chest, February 17, 2021, CT chest, January 25, 2021, March 15, 2019. FINDINGS: Right lung: No nodules, masses, consolidation, pleural effusion, pneumothorax. Mild peripheral groundglass opacity, right middle, and right lower lobes. Left lung: No nodules, masses, consolidation, pleural effusion, pneumothorax. Subsegmental consolidation left lower lobe. Mild dependent groundglass opacity left upper lobe. Lymph nodes: No hilar, mediastinal, or axillary lymph node enlargement. Thoracic aorta: AP and transverse dimension, ascending thoracic aorta, at level of pulmonary arterial bifurcation, measures 4.6 x 4.9 cm. This compares with prior measurement of 4.5 x 4.5 cm, in February, 2021. Cardiac Size: Enlarged. Pericardial effusion: None. Upper abdomen:Limited imaging upper abdomen shows no gross anomaly. Musculoskeletal:No osteoblastic, and no osteolytic lesions. Ascending thoracic aortic aneurysm measuring 4.6 x 4.9 cm cyst discussed. Cardiomegaly. Groundglass opacities as described may reflect edema, or other inflammatory or infectious etiologies. All CT scans at this facility use dose modulation, iterative reconstruction, and/or weight based dosing when appropriate to reduce radiation dose to as low as reasonably achievable. CT ABDOMEN PELVIS W IV CONTRAST Additional Contrast? None    Result Date: 7/18/2022  Comparison: February 8, 2021 History: Right lower quadrant abdominal pain  Technique: CT ABDOMEN PELVIS W IV CONTRAST Helically Acquired CT of the  abdomen and pelvis was performed during the intravenous infusion of 100 mL of Isovue-370. Axial delayed images were also performed. Reformatted sagittal and coronal images were also  obtained. Findings: The visualized bases of the lungs shows no consolidating pneumonia or pleural effusion. There is atelectasis seen in the bases.  The thoracic aorta and visualized portion is tortuous and mildly dilated at 4 cm in greatest transverse diameter which is stable. . In the right lower quadrant of the abdomen there are multiple bowel loops seen that are peripheral to hypodense collections which could be from fluid or proteinaceous collections such as abscess. This the loops of bowel appear to be a ascending colon and  ileum. The largest walled off collections measure 4.5 x 3.8 x 3.3 cm and 4.4 x 3.4 x 4.2 cm. There is pericolic stranding seen. The left colon is unremarkable. A cystic structure is again seen in the pelvis that measures 6.7 x 8.6 x 9.8 cm. A small amount of free fluid is seen in the posterior pelvis. Bilateral inguinal hernias are seen. The hernia on the left contains fat. The hernia on the right contains a loop of small bowel. No bowel obstruction is seen. The liver is decreased in attenuation and is enlarged. The spleen, pancreas and gallbladder and adrenal glands are unremarkable. Bilaterally both kidneys show uptake of contrast with no evidence for hydronephrosis or renal calculi. Prominent atherosclerotic calcifications are seen. The bladder is partially decompressed. The bladder wall however also appears to be thickened. Degenerative changes are seen in the spine at multiple levels. Intervertebral disc space narrowing is seen at L1 to. Slight anterolisthesis of L4 on L5 is noted. There is vacuum anomaly seen at all levels in the lumbar spine. Impression: 1. In the right lower quadrant of the abdomen, pericolic abscesses versus pericolic mass is seen. This is thought more likely abscess. 2. A cystic mass is again seen in the pelvis and is likely in the ovary. It is unchanged from previous examination The patient will be given oral contrast and rescanned to evaluate the right lower quadrant.   All CT scans at this facility use dose modulation, iterative reconstruction, and/or weight based dosing       Active Hospital Problems    Diagnosis Date Noted    A-fib [facility]) [I48.91]      Priority: High    Abdominal mass [R19.00] 07/19/2022     Priority: Medium    Abdominal pain [R10.9] 07/18/2022     Priority: Medium    Hyponatremia [E87.1] 07/18/2022     Priority: Medium    Hypokalemia [E87.6] 07/18/2022     Priority: Medium    Anemia [D64.9] 07/18/2022     Priority: Medium    CHF (congestive heart failure) ([facility] [I50.9] 07/18/2022     Priority: Medium    Hypothyroid [E03.9] 07/18/2022     Priority: Medium    Essential hypertension [I10] 02/16/2018     Priority: Low         Impression/Plan:   Preop Abd Mass - Moderate rsik due to age but not prohibitve   LVEF 60  Frequent PAF - start iv BB. CAD- moderate - no angina. CV meds on hold. I BB to start  HTN Stable  HPL - statin on hold  Ascending AO aneurysm- routine surveillance. Desscending Ao aneurysm - 4.8- routine surveillance. Thank you for allowing us to participate in the care of this patient. Will continue to follow. Please call if questions or concerns arise.     Electronically signed by Ronal Plasencia MD on 7/20/2022 at 4:27 PM

## 2022-07-20 NOTE — PROGRESS NOTES
Physical Therapy Missed Treatment   Facility/Department: [facility] MED SURG L601/D835-88    NAME: Landy Jensen    : 1932 (80 y.o.)  MRN: [id number]    Account: [de-identified]  Gender: female      PT evaluation and treatment orders received. Chart reviewed. PT evaluation attempted. Pt declining assessment at this time. Appears fatigued. Pt indicating that she has been up to the restroom \"every 5 min\". Nursing staff notified. Will follow and attempt PT evaluation again at earliest availability.        Kobe Marquez, PT, 22 at 3:37 PM

## 2022-07-20 NOTE — PROGRESS NOTES
0900: Pt having frequent yellowish loose stools. Whenever pt gets up to the bedside commode, monitor room calls reporting that pt is in V-tach with HR. HR as up to 180's. Pt appears very fatigue, SOB, and dizzy whenever she gets up to the bedside commode. 1000: [facility] from special procedures called stating that pt is being scheduled for abscess drainage with Dr. Ángel Covington at 2:00pm today. [facility] was informed that this RN spoke with patient's son Mira yesterday after his conversation with Dr. Ángel Covington and Bia Vazquez decided to go with the second option he was presented with which was a surgery to remove the abscess which would be performed by Dr. Maria Alejandra Ordonez due to the risk of his mother's SB being perforated during the abscess drainage at which point, pt  would have to be rushed to the OR for an emergent surgery per Dr. Ángel Covington. This RN was then informed that Dr. Ángel Covington spoke with Bia Vazquez and that Dr. Maria Alejandra Ordonez will be the one performing procedure, not Dr. Ángel Covington.     1600: Anhtadebra (patient's son) at bedside inquiring about his mother's surgery. Mira was advised that Dr. Maria Alejandra Ordonez would be speaking with him soon personally, to please wait in the patient's room as a this RN felt that a face-to-face conversation regarding the POC was vital under the circumstances. 1630: Dr. Glen Wetzel at bedside-ordered 5mg Lopressor IV Q6. Please see Cardiology note for details. 1800: Dr. Maria Alejandra Ordonez at bedside speaking to patient's son. Patient's son states that he will wait to speak with Nando Covington again tomorrow regarding a less invasive approach for treatment for his mother. Please see Dr. Leighann White  note for details.     Electronically signed by Bren Gaona RN on 7/20/22 at 8:16 PM EDT

## 2022-07-20 NOTE — PROGRESS NOTES
Patient seen and examined    Dr. Monica Webster discussed the care with her son. He discussed the possibility of not being able to successfully drain the abscess. There was interest in surgical intervention. I met with the son and showed him the CAT scan. I discussed my role as a surgeon including exploratory laparotomy with right colectomy and drainage of abscess, creation of colostomy. I simply feel that this intervention will be too much for her and do not believe she will ultimately do well and survive with such an extensive operation. I did feel for histology as well as attempted drainage, a percutaneous approach would be a much less invasive way of treatment. I fully understand that the chances of marked improvement or even success-complete drainage are small with this approach but the operative approach would be quite debilitating and a significant increase in morbidity and mortality. In addition patient has been having significant runs of V. tach. Patient's son wants to discuss again with Dr. Monica Webster the prospects of attempted percutaneous drainage and drain placement. He does not want his mother to go through a big intensive operation if there is significant chance she will not survive as well as the quality of life issues that would ensue. I talked personally with Dr. Monica Webster about this and he will discuss again tomorrow with the son regarding the potential for less invasive ways of diagnosis and possible treatment.

## 2022-07-20 NOTE — PLAN OF CARE
Problem: Discharge Planning  Goal: Discharge to home or other facility with appropriate resources  Outcome: Progressing     Problem: Chronic Conditions and Co-morbidities  Goal: Patient's chronic conditions and co-morbidity symptoms are monitored and maintained or improved  Outcome: Progressing     Problem: Skin/Tissue Integrity  Goal: Absence of new skin breakdown  Description: 1. Monitor for areas of redness and/or skin breakdown  2. Assess vascular access sites hourly  3. Every 4-6 hours minimum:  Change oxygen saturation probe site  4. Every 4-6 hours:  If on nasal continuous positive airway pressure, respiratory therapy assess nares and determine need for appliance change or resting period.   Outcome: Progressing

## 2022-07-20 NOTE — PROGRESS NOTES
31.4* 28.6* 28.4*    306 328       Recent Labs     07/19/22  0614 07/20/22  0119 07/20/22  0628    139 139   K 3.2* 2.9* 3.9    102 104   CO2 26 25 26   BUN 12 15 14   CREATININE 0.61 0.63 0.57   CALCIUM 8.3* 8.5 8.6       Recent Labs     07/18/22  1230 07/19/22  0614 07/20/22  0628   AST 27 27 25   ALT 29 27 26   BILITOT 0.5 0.6 0.5   ALKPHOS 66 56 56       No results for input(s): INR in the last 72 hours. Recent Labs     07/18/22  1230   CKTOTAL 46   TROPONINI <0.010         Urinalysis:      Lab Results   Component Value Date/Time    NITRU Negative 07/18/2022 12:30 PM    WBCUA 20-50 07/18/2022 12:30 PM    BACTERIA Negative 07/18/2022 12:30 PM    RBCUA 6-10 07/18/2022 12:30 PM    BLOODU SMALL 07/18/2022 12:30 PM    SPECGRAV 1.044 07/18/2022 12:30 PM    GLUCOSEU Negative 07/18/2022 12:30 PM       Radiology:  CT ABDOMEN PELVIS WO CONTRAST Additional Contrast? Oral   Final Result      Complex 10.0 x 8.0 x 10.6 cm mass with septated central low attenuation fluid collections as discussed. Mass displaces cecum and ascending colon anteriorly. Differential includes malignancy including appendiceal mucocele/carcinoma, as well as    retroperitoneal sarcoma with central necrotic change. Infection/abscess secondary consideration, minimal presence of adjacent inflammatory change. 8.0 x 5.8 x 7.5 cm left adnexal cysts as discussed, most likely ovarian in etiology. If clinical concern warrants, pelvic sonography may be obtained for further evaluation. Other findings discussed. All CT scans at this facility use dose modulation, iterative reconstruction, and/or weight based dosing when appropriate to reduce radiation dose to as low as reasonably achievable. CT ABDOMEN PELVIS W IV CONTRAST Additional Contrast? None   Final Result   Impression:   1. In the right lower quadrant of the abdomen, pericolic abscesses versus pericolic mass is seen. This is thought more likely abscess. 2. A cystic mass is again seen in the pelvis and is likely in the ovary. It is unchanged from previous examination   The patient will be given oral contrast and rescanned to evaluate the right lower quadrant. All CT scans at this facility use dose modulation, iterative reconstruction, and/or weight based dosing                                                 CT Head WO Contrast   Final Result   1. Age-appropriate cortical atrophy and periventricular microangiopathy. When compared to previous study there has been no significant interval change. 2. No acute hemorrhage or extra-axial fluid collection         All CT scans at this facility use dose modulation, iterative reconstruction, and/or weight based dosing when appropriate to reduce radiation dose to as low as reasonably achievable. Assessment/Plan:         Abdominal pain - mass vs abscess       - check CEA; family is considering surgery now as unable to do IR guided due to anatomy; continue empiric Abx       A-fib ([address] -Continue home meds; hold anticoagulation       Hyponatremia -Resolved.        Hypokalemia -Replete       Anemia - Continue home meds       Essential hypertension -Continue home meds       CHF (congestive heart failure) ([address] -Continue dmitri emeds       Hypothyroid -Continue home meds       Active Hospital Problems    Diagnosis Date Noted    A-fib [facility]) [I48.91]      Priority: High    Abdominal mass [R19.00] 07/19/2022     Priority: Medium    Abdominal pain [R10.9] 07/18/2022     Priority: Medium    Hyponatremia [E87.1] 07/18/2022     Priority: Medium    Hypokalemia [E87.6] 07/18/2022     Priority: Medium    Anemia [D64.9] 07/18/2022     Priority: Medium    CHF (congestive heart failure) ([address] [I50.9] 07/18/2022     Priority: Medium    Hypothyroid [E03.9] 07/18/2022     Priority: Medium    Essential hypertension [I10] 02/16/2018        Additional work up or/and treatment plan may be added today or then after based on clinical progression. I am managing a portion of pt care. Some medical issues are handled by other specialists. Additional work up and treatment should be done in out pt setting by pt PCP and other out pt providers. In addition to examining and evaluating pt, I spent additional time explaining care, normal and abnormal findings, and treatment plan. All of pt questions were answered. Counseling, diet and education were  provided. Case will be discussed with nursing staff when appropriate. Family will be updated if and when appropriate.       Diet: Diet NPO    Code Status: Full Code    PT/OT Eval     Electronically signed by Nu Garcia MD on 7/20/2022 at 2:01 PM

## 2022-07-21 ENCOUNTER — APPOINTMENT (OUTPATIENT)
Dept: CT IMAGING | Age: 87
DRG: 372 | End: 2022-07-21
Payer: MEDICARE

## 2022-07-21 PROBLEM — R10.31 RIGHT LOWER QUADRANT ABDOMINAL PAIN: Status: ACTIVE | Noted: 2022-07-18

## 2022-07-21 LAB
ALBUMIN SERPL-MCNC: 2.3 G/DL (ref 3.5–4.6)
ALP BLD-CCNC: 59 U/L (ref 40–130)
ALT SERPL-CCNC: 24 U/L (ref 0–33)
ANION GAP SERPL CALCULATED.3IONS-SCNC: 11 MEQ/L (ref 9–15)
AST SERPL-CCNC: 26 U/L (ref 0–35)
BASOPHILS ABSOLUTE: 0 K/UL (ref 0–0.2)
BASOPHILS RELATIVE PERCENT: 0.2 %
BILIRUB SERPL-MCNC: 0.4 MG/DL (ref 0.2–0.7)
BUN BLDV-MCNC: 11 MG/DL (ref 8–23)
CALCIUM SERPL-MCNC: 8.5 MG/DL (ref 8.5–9.9)
CHLORIDE BLD-SCNC: 103 MEQ/L (ref 95–107)
CO2: 25 MEQ/L (ref 20–31)
CREAT SERPL-MCNC: 0.45 MG/DL (ref 0.5–0.9)
EKG ATRIAL RATE: 76 BPM
EKG P AXIS: 100 DEGREES
EKG P-R INTERVAL: 200 MS
EKG Q-T INTERVAL: 420 MS
EKG QRS DURATION: 160 MS
EKG QTC CALCULATION (BAZETT): 472 MS
EKG R AXIS: -52 DEGREES
EKG T AXIS: -13 DEGREES
EKG VENTRICULAR RATE: 76 BPM
EOSINOPHILS ABSOLUTE: 0.4 K/UL (ref 0–0.7)
EOSINOPHILS RELATIVE PERCENT: 3.3 %
GFR AFRICAN AMERICAN: >60
GFR NON-AFRICAN AMERICAN: >60
GLOBULIN: 3.5 G/DL (ref 2.3–3.5)
GLUCOSE BLD-MCNC: 107 MG/DL (ref 70–99)
HCT VFR BLD CALC: 30.2 % (ref 37–47)
HEMOGLOBIN: 10 G/DL (ref 12–16)
LYMPHOCYTES ABSOLUTE: 0.7 K/UL (ref 1–4.8)
LYMPHOCYTES RELATIVE PERCENT: 5.6 %
MAGNESIUM: 1.8 MG/DL (ref 1.7–2.4)
MCH RBC QN AUTO: 28 PG (ref 27–31.3)
MCHC RBC AUTO-ENTMCNC: 33.1 % (ref 33–37)
MCV RBC AUTO: 84.7 FL (ref 82–100)
MONOCYTES ABSOLUTE: 0.8 K/UL (ref 0.2–0.8)
MONOCYTES RELATIVE PERCENT: 7.1 %
NEUTROPHILS ABSOLUTE: 9.9 K/UL (ref 1.4–6.5)
NEUTROPHILS RELATIVE PERCENT: 83.8 %
PDW BLD-RTO: 14.3 % (ref 11.5–14.5)
PLATELET # BLD: 335 K/UL (ref 130–400)
POTASSIUM REFLEX MAGNESIUM: 3.5 MEQ/L (ref 3.4–4.9)
RBC # BLD: 3.57 M/UL (ref 4.2–5.4)
SODIUM BLD-SCNC: 139 MEQ/L (ref 135–144)
TOTAL PROTEIN: 5.8 G/DL (ref 6.3–8)
WBC # BLD: 11.8 K/UL (ref 4.8–10.8)

## 2022-07-21 PROCEDURE — 2500000003 HC RX 250 WO HCPCS: Performed by: INTERNAL MEDICINE

## 2022-07-21 PROCEDURE — 1210000000 HC MED SURG R&B

## 2022-07-21 PROCEDURE — 2500000003 HC RX 250 WO HCPCS: Performed by: RADIOLOGY

## 2022-07-21 PROCEDURE — 82378 CARCINOEMBRYONIC ANTIGEN: CPT

## 2022-07-21 PROCEDURE — 6360000002 HC RX W HCPCS

## 2022-07-21 PROCEDURE — 2580000003 HC RX 258

## 2022-07-21 PROCEDURE — 0D9K3ZZ DRAINAGE OF ASCENDING COLON, PERCUTANEOUS APPROACH: ICD-10-PCS | Performed by: INTERNAL MEDICINE

## 2022-07-21 PROCEDURE — 2580000003 HC RX 258: Performed by: INTERNAL MEDICINE

## 2022-07-21 PROCEDURE — 2700000000 HC OXYGEN THERAPY PER DAY

## 2022-07-21 PROCEDURE — 83735 ASSAY OF MAGNESIUM: CPT

## 2022-07-21 PROCEDURE — 6360000002 HC RX W HCPCS: Performed by: RADIOLOGY

## 2022-07-21 PROCEDURE — 36415 COLL VENOUS BLD VENIPUNCTURE: CPT

## 2022-07-21 PROCEDURE — 77012 CT SCAN FOR NEEDLE BIOPSY: CPT | Performed by: RADIOLOGY

## 2022-07-21 PROCEDURE — 49406 IMAGE CATH FLUID PERI/RETRO: CPT | Performed by: RADIOLOGY

## 2022-07-21 PROCEDURE — 93010 ELECTROCARDIOGRAM REPORT: CPT | Performed by: INTERNAL MEDICINE

## 2022-07-21 PROCEDURE — 87205 SMEAR GRAM STAIN: CPT

## 2022-07-21 PROCEDURE — C1769 GUIDE WIRE: HCPCS

## 2022-07-21 PROCEDURE — 75989 ABSCESS DRAINAGE UNDER X-RAY: CPT

## 2022-07-21 PROCEDURE — 85025 COMPLETE CBC W/AUTO DIFF WBC: CPT

## 2022-07-21 PROCEDURE — 2580000003 HC RX 258: Performed by: RADIOLOGY

## 2022-07-21 PROCEDURE — 10160 PNXR ASPIR ABSC HMTMA BULLA: CPT | Performed by: RADIOLOGY

## 2022-07-21 PROCEDURE — 49406 IMAGE CATH FLUID PERI/RETRO: CPT

## 2022-07-21 PROCEDURE — 87075 CULTR BACTERIA EXCEPT BLOOD: CPT

## 2022-07-21 PROCEDURE — 87070 CULTURE OTHR SPECIMN AEROBIC: CPT

## 2022-07-21 PROCEDURE — 99232 SBSQ HOSP IP/OBS MODERATE 35: CPT | Performed by: INTERNAL MEDICINE

## 2022-07-21 PROCEDURE — 99233 SBSQ HOSP IP/OBS HIGH 50: CPT | Performed by: NURSE PRACTITIONER

## 2022-07-21 PROCEDURE — A4217 STERILE WATER/SALINE, 500 ML: HCPCS | Performed by: RADIOLOGY

## 2022-07-21 PROCEDURE — 80053 COMPREHEN METABOLIC PANEL: CPT

## 2022-07-21 PROCEDURE — 86301 IMMUNOASSAY TUMOR CA 19-9: CPT

## 2022-07-21 RX ORDER — MIDAZOLAM HYDROCHLORIDE 2 MG/2ML
0.5 INJECTION, SOLUTION INTRAMUSCULAR; INTRAVENOUS
Status: DISCONTINUED | OUTPATIENT
Start: 2022-07-21 | End: 2022-07-21

## 2022-07-21 RX ORDER — MAGNESIUM HYDROXIDE 1200 MG/15ML
LIQUID ORAL CONTINUOUS PRN
Status: COMPLETED | OUTPATIENT
Start: 2022-07-21 | End: 2022-07-21

## 2022-07-21 RX ORDER — 0.9 % SODIUM CHLORIDE 0.9 %
250 INTRAVENOUS SOLUTION INTRAVENOUS
Status: DISCONTINUED | OUTPATIENT
Start: 2022-07-21 | End: 2022-07-21

## 2022-07-21 RX ORDER — LIDOCAINE HYDROCHLORIDE 20 MG/ML
INJECTION, SOLUTION INFILTRATION; PERINEURAL
Status: COMPLETED | OUTPATIENT
Start: 2022-07-21 | End: 2022-07-21

## 2022-07-21 RX ORDER — FENTANYL CITRATE 50 UG/ML
INJECTION, SOLUTION INTRAMUSCULAR; INTRAVENOUS
Status: COMPLETED | OUTPATIENT
Start: 2022-07-21 | End: 2022-07-21

## 2022-07-21 RX ORDER — SODIUM CHLORIDE, SODIUM LACTATE, POTASSIUM CHLORIDE, CALCIUM CHLORIDE 600; 310; 30; 20 MG/100ML; MG/100ML; MG/100ML; MG/100ML
INJECTION, SOLUTION INTRAVENOUS CONTINUOUS
Status: DISCONTINUED | OUTPATIENT
Start: 2022-07-21 | End: 2022-07-22

## 2022-07-21 RX ORDER — FENTANYL CITRATE 50 UG/ML
50 INJECTION, SOLUTION INTRAMUSCULAR; INTRAVENOUS
Status: DISCONTINUED | OUTPATIENT
Start: 2022-07-21 | End: 2022-07-21

## 2022-07-21 RX ORDER — 0.9 % SODIUM CHLORIDE 0.9 %
INTRAVENOUS SOLUTION INTRAVENOUS CONTINUOUS PRN
Status: COMPLETED | OUTPATIENT
Start: 2022-07-21 | End: 2022-07-21

## 2022-07-21 RX ORDER — MIDAZOLAM HYDROCHLORIDE 2 MG/2ML
INJECTION, SOLUTION INTRAMUSCULAR; INTRAVENOUS
Status: COMPLETED | OUTPATIENT
Start: 2022-07-21 | End: 2022-07-21

## 2022-07-21 RX ORDER — LIDOCAINE HYDROCHLORIDE 20 MG/ML
10 INJECTION, SOLUTION INFILTRATION; PERINEURAL
Status: DISCONTINUED | OUTPATIENT
Start: 2022-07-21 | End: 2022-07-21

## 2022-07-21 RX ADMIN — PIPERACILLIN AND TAZOBACTAM 3375 MG: 3; .375 INJECTION, POWDER, LYOPHILIZED, FOR SOLUTION INTRAVENOUS at 13:02

## 2022-07-21 RX ADMIN — SODIUM CHLORIDE, POTASSIUM CHLORIDE, SODIUM LACTATE AND CALCIUM CHLORIDE 100 ML/HR: 600; 310; 30; 20 INJECTION, SOLUTION INTRAVENOUS at 13:18

## 2022-07-21 RX ADMIN — METOPROLOL TARTRATE 5 MG: 1 INJECTION, SOLUTION INTRAVENOUS at 04:41

## 2022-07-21 RX ADMIN — KETOROLAC TROMETHAMINE 15 MG: 15 INJECTION, SOLUTION INTRAMUSCULAR; INTRAVENOUS at 04:43

## 2022-07-21 RX ADMIN — MIDAZOLAM HYDROCHLORIDE 0.5 MG: 1 INJECTION, SOLUTION INTRAMUSCULAR; INTRAVENOUS at 15:17

## 2022-07-21 RX ADMIN — FENTANYL CITRATE 50 MCG: 50 INJECTION, SOLUTION INTRAMUSCULAR; INTRAVENOUS at 15:17

## 2022-07-21 RX ADMIN — PIPERACILLIN AND TAZOBACTAM 3375 MG: 3; .375 INJECTION, POWDER, LYOPHILIZED, FOR SOLUTION INTRAVENOUS at 21:06

## 2022-07-21 RX ADMIN — LIDOCAINE HYDROCHLORIDE 10 ML: 20 INJECTION, SOLUTION INFILTRATION; PERINEURAL at 15:09

## 2022-07-21 RX ADMIN — SODIUM CHLORIDE 250 ML: 9 INJECTION, SOLUTION INTRAVENOUS at 15:02

## 2022-07-21 RX ADMIN — METOPROLOL TARTRATE 5 MG: 1 INJECTION, SOLUTION INTRAVENOUS at 18:57

## 2022-07-21 RX ADMIN — Medication 10 ML: at 21:07

## 2022-07-21 RX ADMIN — PIPERACILLIN AND TAZOBACTAM 3375 MG: 3; .375 INJECTION, POWDER, LYOPHILIZED, FOR SOLUTION INTRAVENOUS at 04:45

## 2022-07-21 RX ADMIN — Medication 10 ML: at 13:59

## 2022-07-21 RX ADMIN — SODIUM CHLORIDE 500 ML: 900 IRRIGANT IRRIGATION at 15:09

## 2022-07-21 ASSESSMENT — PAIN SCALES - GENERAL: PAINLEVEL_OUTOF10: 5

## 2022-07-21 ASSESSMENT — ENCOUNTER SYMPTOMS
DIARRHEA: 0
TROUBLE SWALLOWING: 0
EYES NEGATIVE: 1
STRIDOR: 0
GASTROINTESTINAL NEGATIVE: 1
ABDOMINAL PAIN: 1
BLOOD IN STOOL: 0
RESPIRATORY NEGATIVE: 1
COUGH: 0
BACK PAIN: 0
SHORTNESS OF BREATH: 0
COLOR CHANGE: 0
SORE THROAT: 0
CHEST TIGHTNESS: 0
VOMITING: 0
WHEEZING: 0
NAUSEA: 0

## 2022-07-21 ASSESSMENT — PAIN DESCRIPTION - DESCRIPTORS: DESCRIPTORS: CRAMPING;ACHING

## 2022-07-21 ASSESSMENT — PAIN DESCRIPTION - ORIENTATION: ORIENTATION: LOWER

## 2022-07-21 ASSESSMENT — PAIN DESCRIPTION - LOCATION: LOCATION: ABDOMEN

## 2022-07-21 NOTE — CARE COORDINATION
PATIENT NOT ABLE TO TOLERATE WORKING WITH PT. SNF MAY BE MORE APPROPRIATE AT [place] . LSW/CM TO FOLLOW. WILL RE EVAL POST INTERVENTION.

## 2022-07-21 NOTE — PROGRESS NOTES
Physical Therapy Missed Treatment   Facility/Department: [facility] MED SURG G551/K004-04    NAME: Kavitha Choe    : 1932 (80 y.o.)  MRN: [id number]    Account: [de-identified]  Gender: female      Referral received. Attempted to see pt. Pt understood basic conversation and indicated that she did not want to get out of bed. Nursing staff notified. Will follow and attempt PT evaluation again at earliest availability.        Garfield Joy, PT, 22 at 10:23 AM

## 2022-07-21 NOTE — DISCHARGE INSTRUCTIONS
Learning About Fluid Collection Drain    Purpose for the drain  Fluid collection drains are placed to drain and remove a fluid filled pocket or a pocket of infection called an abscess. Sample the fluid in the wound or area of concern for evaluation. Reduce the size of abscesses that are enlarged. What is a Fluid Collection drain? The drain is a thin plastic tube (Catheter) that the doctor places internally into in the fluid/abscess pocket. From there the tube passes externally through your skin and into a fluid collection bag. The bag may be attached to a belt or strapped to the leg. The abscess fluid that comes out of the drain may look bloody at first, but it may change to its normal yellow- green-tan color. How long the drain stays in depends on how quickly the abscess fluid drainage resolves. Your doctor will discuss this with you. Activity  You may be sore for a few days. This may limit your activity. You should avoid activities that cause pulling or tugging on the catheter or insertion site. Check with your Primary Care Physician or Pain management if you feel that you need pain medicine. For Better Drainage and Comfort  Position the tube and bag by keeping it taped securely below the insertion site without kinking the tubing. Keep the drain secure with use of the clip or strap. Keep dressing around the drain as dry as possible. Do not swim, go in a hot tube, or take a tub bath while the drain is in place. You may sponge bathe or shower. Tape a piece of cellophane over the site to cover and seal the dressing to keep it dry. Catheter Fastener  To make it easier for you to take care your drainage catheter at home, it has been secured with a special dressing called a Stay Fix dressing. It was designed to be comfortable on your skin while it sticks securely and keeps your catheter from moving around or being pulled out.  It also allows your catheter to lie against your body and helps prevent the catheter form kinking. Inspect the Stay Fix dressing daily to make sure it is still holding the catheter securely and adherent to your skin. Caring for your Drainage [address] [facility] ([phone] Dr. Andrei Machado office) to make appointments for dressing changes at least every two weeks. Expect to see the Clinical Nurse Practitioner about every other week while the drain is in. Each Stay Fix dressing will remain on your skin at least two weeks if there is no drainage noted. After the two weeks, the dressing will need to be replaced and the site cleaned. If you notice the fastener coming loose before your two-week appointment, contact the [facility]iology Clinic to have it replaced with a new one by the Nurse Practitioner. Make sure you wash your hands before and after emptying or flushing your drain tube. Always maintain continuous suction to the drainage bag by keeping the accordion bag compressed. You may need to re-activate suction by compressing the sinai several time throughout the day. The number of times you re-activate the suction will depend on how much drainage there is. Please record the amount of drainage daily with provided measuring cup. Empty your drainage bag daily or as often as needed into the toilet. Call the [facility] when drainage is less the 5 ml/ 24 hours to discuss possible catheter removal.   EMPTY THE BAG AS FOLLOWS:  Wash your hands with soap and water. Turn the bag upside down. Open the bottom cap from the bag. (Turn the cap to open it)   Poor the contents into a container for measuring volume. Record your drainage output daily. Once measured and recorded, you may empty into the toilet. Close the cap on the bag. (Turn the cap to close it)   Wash your hands with soap and water. FLUSHING YOUR DRAINAGE CATHETER: If Applicable: DO NOT flush or irrigate the catheter unless you are instructed to do so.     Some patients will need to flush the tube to make sure it stays open. If your Radiologist has instructed to do so, flush the catheter following the instructions below. FLUSH AS FOLLOWS:  Wash your hands with soap and water. Turn the stopcock off to the drainage bag and on to the drainage catheter (Note the arrow). Remove the cap from the stopcock. Use an alcohol pad to cleanse the port. Attach a 10 ml normal saline syringe to the stopcock and flush the drainage tube. Do not pull back on the tube into the syringe. Turn the stopcock off to the syringe port. Disconnect the syringe and replace the cap on the stopcock. Wash your hands with soap and water. COMPLICATIONS TO WATCH FOR  Contact your Infectious Disease doctor or surgeon if you have the following symptoms. Signs of infection: Fever, temperature above 101 degrees or chills. Notify Interventional Radiology Clinic/ Dr. Arley Soliman If:  Sudden stop in drainage or excessive drainage around the catheter insertion site. These could be signs that the catheter system is clogged. Skin redness, tenderness or increased tenderness at the insertion site. (Slight redness without irritation is normal at the site. You may find taking pictures daily to be helpful). Large amounts of bloody drainage. Your catheter has been pulled out. Safety  If you were given medication during your procedure, you can expect to feel weak, dizzy, or drowsy for up to 12 hours after your procedure. DO NOT drive a car or operate heavy machinery for the remainder of the day. Your reflexes and coordination may be delayed. NO alcoholic beverages today. Postpone signing important papers or making important decisions for at least 24 hours. Because of the side effects of the medications, we recommend that someone stay with you for 12 to 24 hours or overnight the night following the procedure.        Supplies  10 ml normal saline syringes  4 by 4 gauze pads  Alcohol pads  Transparent dressings  Tape to secure the catheter   Measuring container. Questions or concerns contact the Interventional Radiology Clinic/ Dr. Ariana Akhtar office at [phone].

## 2022-07-21 NOTE — PROGRESS NOTES
Progress Note  Patient: Lucie Torres  Unit/Bed: U639/U765-02  YOB: 1932  MRN: [id number]  Acct: [de-identified]   Admitting Diagnosis: Abdominal pain [R10.9]  Right sided abdominal pain [R10.9]  Abdominal mass, unspecified abdominal location [R19.00]  Admit Date:  7/18/2022  Hospital Day: 3    Chief Complaint: PAF    Histories:  Past Medical History:   Diagnosis Date    Ascending aortic aneurysm ([address] 6/23/2017    Charcot Arleen Tooth muscular atrophy     CH[facility]congestive heart failure) ([facility])     Chronic diastolic CHF (congestive heart failure) ([address] 4/1/2022    Depression     Descending aortic aneurysm ([address] 6/23/2017    Essential hypertension 2/16/2018    Headache     HTN (hypertension)     Impaired mobility and activities of daily living     Lumbar stenosis with neurogenic claudication     Myelopathy ([address]     Osteoarthritis      Past Surgical History:   Procedure Laterality Date    JOINT REPLACEMENT Bilateral     knees    PLEURAL CATH INSERTION Left 2/25/2021    LEFT PLEURAL CATHETER INSERTION performed by Terrie Ferrari MD at [place] [id number] Left 01/29/2021    total of 755 cc removed per Dr Lucía Regan specimen sent to lab     Family History   Problem Relation Age of Onset    Arthritis Mother     Arthritis Father     High Blood Pressure Father      Social History     Socioeconomic History    Marital status:     Tobacco Use    Smoking status: Never    Smokeless tobacco: Never   Substance and Sexual Activity    Alcohol use: No     Alcohol/week: 0.0 standard drinks    Drug use: No   Social History Narrative    Lives With: Alone, son lives down the street, dtr is in the area    Has a son in the area    Type of Home[place] [address]: One level    Home Access: Stairs to enter with rails- Number of Steps: 2- Rails: Both    Bathroom Shower/Tub: Tub/Shower unit, Bathroom Equipment: Grab bars in shower, Shower chair    Home Equipment: Rolling walker, Cane(Pt supple. Neurological: She is alert and oriented to person, place, and time. Skin: Skin is warm. No cyanosis. Nails show no clubbing.      LABS:  CBC:   Lab Results   Component Value Date/Time    WBC 11.8 07/21/2022 06:20 AM    RBC 3.57 07/21/2022 06:20 AM    HGB 10.0 07/21/2022 06:20 AM    HCT 30.2 07/21/2022 06:20 AM    MCV 84.7 07/21/2022 06:20 AM    MCH 28.0 07/21/2022 06:20 AM    MCHC 33.1 07/21/2022 06:20 AM    RDW 14.3 07/21/2022 06:20 AM     07/21/2022 06:20 AM     CBC with Differential:    Lab Results   Component Value Date/Time    WBC 11.8 07/21/2022 06:20 AM    RBC 3.57 07/21/2022 06:20 AM    HGB 10.0 07/21/2022 06:20 AM    HCT 30.2 07/21/2022 06:20 AM     07/21/2022 06:20 AM    MCV 84.7 07/21/2022 06:20 AM    MCH 28.0 07/21/2022 06:20 AM    MCHC 33.1 07/21/2022 06:20 AM    RDW 14.3 07/21/2022 06:20 AM    LYMPHOPCT 5.6 07/21/2022 06:20 AM    MONOPCT 7.1 07/21/2022 06:20 AM    BASOPCT 0.2 07/21/2022 06:20 AM    MONOSABS 0.8 07/21/2022 06:20 AM    LYMPHSABS 0.7 07/21/2022 06:20 AM    EOSABS 0.4 07/21/2022 06:20 AM    BASOSABS 0.0 07/21/2022 06:20 AM     CMP:    Lab Results   Component Value Date/Time     07/21/2022 06:20 AM    K 3.5 07/21/2022 06:20 AM     07/21/2022 06:20 AM    CO2 25 07/21/2022 06:20 AM    BUN 11 07/21/2022 06:20 AM    CREATININE 0.45 07/21/2022 06:20 AM    GFRAA >60.0 07/21/2022 06:20 AM    LABGLOM >60.0 07/21/2022 06:20 AM    GLUCOSE 107 07/21/2022 06:20 AM    PROT 5.8 07/21/2022 06:20 AM    LABALBU 2.3 07/21/2022 06:20 AM    CALCIUM 8.5 07/21/2022 06:20 AM    BILITOT 0.4 07/21/2022 06:20 AM    ALKPHOS 59 07/21/2022 06:20 AM    AST 26 07/21/2022 06:20 AM    ALT 24 07/21/2022 06:20 AM     BMP:    Lab Results   Component Value Date/Time     07/21/2022 06:20 AM    K 3.5 07/21/2022 06:20 AM     07/21/2022 06:20 AM    CO2 25 07/21/2022 06:20 AM    BUN 11 07/21/2022 06:20 AM    LABALBU 2.3 07/21/2022 06:20 AM    CREATININE 0.45 07/21/2022 06:20 AM CALCIUM 8.5 07/21/2022 06:20 AM    GFRAA >60.0 07/21/2022 06:20 AM    LABGLOM >60.0 07/21/2022 06:20 AM    GLUCOSE 107 07/21/2022 06:20 AM     Magnesium:    Lab Results   Component Value Date/Time    MG 1.8 07/21/2022 06:20 AM     Troponin:    Lab Results   Component Value Date/Time    TROPONINI <0.010 07/18/2022 12:30 PM        Active Hospital Problems    Diagnosis Date Noted    A-fib [facility]) [I48.91]      Priority: High    Abdominal mass [R19.00] 07/19/2022     Priority: Medium    Abdominal pain [R10.9] 07/18/2022     Priority: Medium    Hyponatremia [E87.1] 07/18/2022     Priority: Medium    Hypokalemia [E87.6] 07/18/2022     Priority: Medium    Anemia [D64.9] 07/18/2022     Priority: Medium    CHF (congestive heart failure) ([facility] [I50.9] 07/18/2022     Priority: Medium    Hypothyroid [E03.9] 07/18/2022     Priority: Medium    Essential hypertension [I10] 02/16/2018     Priority: Low        Assessment/Plan:  Preop Abd Mass/Abscess - Moderate risk due to age but not prohibitive. Considering IR percutaneous Drainage. LVEF 60  Frequent PAF - start iv BB. Rate stable. DOAC on hold will need to resume A/C promptly when procedure complete and pt stable. CAD- moderate - no angina. CV meds on hold. I BB to start  HTN Stable  HPL - statin on hold  Ascending AO aneurysm- routine surveillance. Desscending Ao aneurysm - 4.8- routine surveillance.           Electronically signed by Claudia Serna MD on 7/21/2022 at 10:32 AM

## 2022-07-21 NOTE — PROGRESS NOTES
Physical Therapy Missed Treatment   Facility/Department: [facility] MED SURG W708/O879-65    NAME: Noris Moctezuma    : 1932 (80 y.o.)  MRN: [id number]    Account: [de-identified]  Gender: female      Unable to see pt for evaluation as she is leaving unit for CT. Will follow and attempt PT evaluation again at earliest availability.        Federico Funes, PT, 22 at 2:37 PM

## 2022-07-21 NOTE — PROGRESS NOTES
PROGRESS NOTE:    Vascular Medicine and Interventional Radiology      Vascular Medicine and Interventional Radiology:    PROGRESS NOTE:       Lilliam Torrez  : 1932  MR #: [id number]       PCP:  Khari Friedman MD     Attending Physician: Roxanne Gupta MD     Date of Admission: 2022 12:14 PM     Chief Complaint:   Chief Complaint   Patient presents with    Abdominal Pain        SUBJECTIVE:   [place] Lang[place]ade A Koutsaftis lying comfortably in bed. Reports RLQ abdominal discomfort only with certain movements or palpation. No other adverse symptoms. Her son is present. Past Medical History:   has a past medical history of Ascending aortic aneurysm ([address] Charcot Arleen Tooth muscular atrophy, CHF (congestive heart failure) ([facility]), Chronic diastolic CHF (congestive heart failure) ([address] Depression, Descending aortic aneurysm ([address] Essential hypertension, Headache, HTN (hypertension), Impaired mobility and activities of daily living, Lumbar stenosis with neurogenic claudication, Myelopathy ([address] and Osteoarthritis. Past SurgicalHistory:   has a past surgical history that includes joint replacement (Bilateral); thoracentesis (Left, 2021); and Pleural Cath Insertion (Left, 2021). Allergies:Latex and Tape Sweetmitul Chong tape]    Home Medications:   Prior to Admission medications    Medication Sig Start Date End Date Taking?  Authorizing Provider   digoxin (LANOXIN) 125 MCG tablet TAKE 1 TABLET DAILY 22   Norva Citrin, APRN - CNP   furosemide (LASIX) 20 MG tablet TAKE 1 TABLET DAILY 22   Norva Citrin, APRN - CNP   amLODIPine (NORVASC) 5 MG tablet Take 1 tablet by mouth daily 22   Jorge Franco,    pantoprazole (PROTONIX) 40 MG tablet TAKE 1 TABLET DAILY 22   Norva Citrin, APRN - CNP   carvedilol (COREG) 6.25 MG tablet TAKE 1 TABLET TWICE A DAY 22   Donn Rosas APRN - CNP   XARELTO 15 MG TABS tablet TAKE 1 TABLET DAILY 1/3/22   Norva Citrin, APRN - CNP nitroGLYCERIN (NITROSTAT) 0.4 MG SL tablet up to max of 3 total doses. If no relief after 1 dose, call 911. 3/1/21   ADRIENNE Rodriguez NP   budesonide-formoterol [facility]) 160-4.5 MCG/ACT AERO Inhale 2 puffs into the lungs 2 times daily 3/1/21   ADRIENNE Rodriguez NP   ferrous sulfate (IRON 325) 325 (65 Fe) MG tablet Take 1 tablet by mouth 2 times daily (with meals) 3/1/21   ADRIENNE Rodriguez NP   Carboxymethylcellulose Sodium (EYE DROPS OP) Apply 1 drop to eye as needed (Dry eyes) Indications: Systane     Historical Provider, MD   Boswellia-Glucosamine-Vit D (OSTEO BI-FLEX ONE PER DAY) TABS Take by mouth daily    Historical Provider, MD   Multiple Vitamins-Minerals (CENTRUM SILVER ULTRA WOMENS) TABS Take by mouth daily    Historical Provider, MD   vitamin B-12 (CYANOCOBALAMIN) 1000 MCG tablet Take 1,000 mcg by mouth daily    Historical Provider, MD   aspirin 81 MG tablet Take 81 mg by mouth daily     Historical Provider, MD   citalopram (CELEXA) 20 MG tablet Take 20 mg by mouth daily  4/1/16   Historical Provider, MD   SYNTHROID 88 MCG tablet Take 88 mcg by mouth daily  3/19/16   Historical Provider, MD        Family History:   Family History   Problem Relation Age of Onset    Arthritis Mother     Arthritis Father     High Blood Pressure Father       SocialHistory:    Social History     Socioeconomic History    Marital status:       Spouse name: Not on file    Number of children: Not on file    Years of education: Not on file    Highest education level: Not on file   Occupational History    Not on file   Tobacco Use    Smoking status: Never    Smokeless tobacco: Never   Substance and Sexual Activity    Alcohol use: No     Alcohol/week: 0.0 standard drinks    Drug use: No    Sexual activity: Not on file   Other Topics Concern    Not on file   Social History Narrative    Lives With: Alone, son lives down the street, dtr is in the area    Has a son in the area    Type of Home: [address]: One level    Home Access: Stairs to enter with rails- Number of Steps: 2- Rails: Both    Bathroom Shower/Tub: Tub/Shower unit, Bathroom Equipment: Grab bars in shower, Shower chair    Home Equipment: Rolling walker, Cane(Pt infrequemtly uses DME for ambulation and prefers to furniture walk in home)    ADL Assistance: Independent, [address]: [facility] [address] Responsibilities: Yes    Ambulation Assistance: Independent, Transfer Assistance: Independent    Additional Comments: Son stops over 2 times daily         Social Determinants of Health     Financial Resource Strain: Not on file   Food Insecurity: Not on file   Transportation Needs: Not on file   Physical Activity: Not on file   Stress: Not on file   Social Connections: Not on file   Intimate Partner Violence: Not on file   Housing Stability: Not on file        ROS:   Review of Systems   Constitutional: Negative. Negative for chills, fatigue and fever. HENT: Negative. Negative for congestion, ear pain, sore throat and trouble swallowing. Eyes: Negative. Negative for visual disturbance. Respiratory: Negative. Negative for cough, shortness of breath and wheezing. Cardiovascular: Negative. Negative for chest pain, palpitations and leg swelling. Gastrointestinal:  Positive for abdominal pain (RLQ moderate discomfort with palpation or certain movements). Negative for diarrhea, nausea and vomiting. Endocrine: Negative. Genitourinary: Negative. Negative for difficulty urinating, dysuria and hematuria. Musculoskeletal: Negative. Negative for back pain. Skin: Negative. Negative for color change, rash and wound. Neurological: Negative. Negative for dizziness, weakness, light-headedness, numbness and headaches. Hematological: Negative. Does not bruise/bleed easily. Psychiatric/Behavioral: Negative. The patient is not nervous/anxious.     All other systems reviewed and are negative. Objective:   Vitals: /75   Pulse 61   Temp 98.2 °F (36.8 °C) (Oral)   Resp 18   Ht 5' 6\" (1.676 m)   Wt 164 lb 6.4 oz (74.6 kg)   SpO2 99%   BMI 26.53 kg/m²      Physical Examination:      Physical Exam  Constitutional:       General: She is not in acute distress. Appearance: Normal appearance. She is not ill-appearing. HENT:      Head: Normocephalic. Nose: No congestion. Cardiovascular:      Rate and Rhythm: Normal rate. Heart sounds: Normal heart sounds. Pulmonary:      Effort: Pulmonary effort is normal.   Abdominal:      General: Bowel sounds are normal.      Palpations: Abdomen is soft. Tenderness: There is abdominal tenderness (RLQ with palpation). Musculoskeletal:         General: Normal range of motion. Cervical back: Normal range of motion. Right lower leg: No edema. Left lower leg: No edema. Skin:     General: Skin is warm and dry. Neurological:      Mental Status: She is alert and oriented to person, place, and time. Psychiatric:         Mood and Affect: Mood normal.         Behavior: Behavior normal.     7/18/2022:  Narrative   CT of the Abdomen and Pelvis without intravenous contrast medium       History:  Approximate 1 week history of abdominal pain       Technical Factors:       CT imaging of the abdomen and pelvis were obtained and formatted as 5 mm contiguous axial images from the domes of the diaphragm to the symphysis pubis. Sagittal and coronal reconstructions were also obtained. Oral contrast medium: Barium sulfate, 450 mL. Intravenous contrast medium:  None. Comparison:  CT abdomen pelvis, July 18, 2022, 1444 hours, February 8, 2021. Findings:       Residual intravenous contrast medium from recent CT examination identified. Lungs:  Lung bases clear. Cardiac size enlarged, unchanged. Calcification at level of mitral valve. Small hiatal hernia.        Liver:  Normal in size, shape, and attenuation. Bile Ducts:  Normal in caliber. Gallbladder:  No stones or wall thickening. Pancreas:  Normal without masses, cysts, ductal dilatation or calcification. Spleen:  Normal in size without masses or calcifications. No splenules. Kidneys:  Normal in size. No hydronephrosis, masses, or stones. Adrenals:  Normal.        Small bowel:  Normal in caliber. See \"peritoneum \". Appendix:  Not visualized. Colon:  Normal in caliber. See \"peritoneum \". Peritoneum:  No free air. A bilobed, 10.0 x 8.0 x 10.6 cm mass having central rounded septated areas of low attenuation, 3.7 x 3.5 x 5.4 cm, and displacing cecum and ascending colon anteriorly (series 2, image 50, series 601, image 54, series 602, image    36). No calcification identified. Trace adjacent fat stranding demonstrated. Vessels: Aorta normal in course and caliber. Lymph nodes:         Retroperitoneal:  No enlarged retroperitoneal lymph nodes. Mesenteric:  No enlarged mesenteric lymph nodes. Pelvic: No enlarged pelvic lymph nodes. Ureters: Normal in course and caliber. No calcifications. Bladder: No wall thickening. Reproductive organs: 8.0 x 5.8 x 7.5 cm left adnexal cyst displacing urinary bladder right and laterally, anteriorly, and inferiorly (series 2, image 68, series 601, image 42, series 602, image 52). Abdominal Wall: Fat identified bilateral inguinal canals. Bones:  No bone lesions. Multilevel disc space narrowing lumbar spine. No post operative changes. Impression       Complex 10.0 x 8.0 x 10.6 cm mass with septated central low attenuation fluid collections as discussed. Mass displaces cecum and ascending colon anteriorly. Differential includes malignancy including appendiceal mucocele/carcinoma, as well as    retroperitoneal sarcoma with central necrotic change.  Infection/abscess secondary consideration, minimal presence of adjacent inflammatory change. 8.0 x 5.8 x 7.5 cm left adnexal cysts as discussed, most likely ovarian in etiology. If clinical concern warrants, pelvic sonography may be obtained for further evaluation. Other findings discussed. All CT scans at this facility use dose modulation, iterative reconstruction, and/or weight based dosing when appropriate to reduce radiation dose to as low as reasonably achievable. ASSESSMENT:    RLQ abdominal septated mass verses fluid collection and/or abscess of unknown etiology, possible malignancy noted on abdominal CT. PLAN:   Son was present and discussed percutaneous CT Guided needle aspiration with possible drain placement with conscious sedation. Risks were discussed including infection, bleeding, increased pain at site, possible injury to surrounding vessels and/or organs, and given her age this as well increases her risk. Cardiology note states moderate risk and can proceed. Discussed that area is septated and there is possibility that area may not be able to be completely drained due to this as well as drain may not be able to be placed if too many small septated areas. Also discussed possibility that if any or all of area is drained and no drain is placed, the fluid could potentially return. If a drain catheter is placed she will be followed in IR clinic average every two weeks and will monitor for removal.    Son states he and the patient would like to proceed with the percutaneous CT guided aspiration/possible drain placement with conscious sedation and not have surgical approach at this time. 4 [facility] Xarelto holding since 7/19. Can resume Xarelto one day post op.      Electronically signed by ADRIENNE Barragan CNP on 7/21/22 at 3:05 PM EDT

## 2022-07-21 NOTE — OR NURSING
1458 Patient to CT via cart. Patient transferred to the table with 3 person assistance. Monitors and oxygen applied. Consent verified. Dr. Moon Koehler reviewing scans. 1505 Time out completed for cat scan guided abdominal mass aspiration. [address] [facility] generously prepped with chlorhexidine per Dr. Moon Koehler.    [phone] Patient draped with full body in sterile fashion. 1509 Lidocaine 2 % given by Dr. Moon Koehler at the marked site with CT guidance. 1512 5 F 10 cm One step centesis needle used to gain access. 1514 Large syringe used to aspirate sample. Specimen obtained by aspirating fluid content milky tan/brown with red streaks puss noted. Approx 30 ml obtained and placed into specimen containers (cytology and sterile cup). 1517 Sedation administered per order. Amplatz inserted through centesis catheter. 1519 Centesis catheter removed over Amplatz wire. Dilators 6, 8 and 10 used to prepare for drain placement. 1524 Flexima APDL locking pigtail 10 Fx 25 cm lot# [id number] exp: 08/11/2024 inserted by Dr. Moon Koehler. Uresil accordian style drainage bag connected. Return of contents confirmed. 26 Dr. Moon Koehler suturing around the drain to secure the drain to the skin with 2-0 prolene. 1531 Stay fix dressing applied, guaze, and tegaderm. No bleeding noted. 1532 Report given to [facility] . Patient tolerated well. Discharge instructions pertaining to drain care attached to the  discharge plan.      FENTANYL  TOTAL 50 mg   VERSED TOTAL 0.5 mg

## 2022-07-21 NOTE — PROGRESS NOTES
[facility] Occupational Therapy      Date: 2022  Patient Name: Hannah Andre        MRN: [id number]  Account: [de-identified]   : 1932  (80 y.o.)  Room: [place] [address]    Chart reviewed, attempted OT at 1020 for eval. Patient not seen 2° to:    Pt. declined, stating: \"Heart attack! Peeing and pooping\"- per chart pt. has diarrhea and increased heart rate    Spoke to [employer], RN aware. Will attempt again when able.     Electronically signed by SERAFIN Marino on 2022 at 10:24 AM

## 2022-07-21 NOTE — PROGRESS NOTES
Hospitalist Progress Note      PCP: Ceasar Faria MD    Date of Admission: 7/18/2022    Chief Complaint:    Chief Complaint   Patient presents with    Abdominal Pain       Subjective:  Patient denies fevers, chills, sweats, CP, SOB, diarrhea or burning micturition. Is hungry today; has right lower quadrant pain. 12 point ROS negative other than mentioned above     Medications:  Reviewed    Infusion Medications    lactated ringers      sodium chloride       Scheduled Medications    metoprolol  5 mg IntraVENous Q6H    [Held by provider] carvedilol  6.25 mg Oral BID    citalopram  20 mg Oral Daily    [Held by provider] digoxin  125 mcg Oral Daily    ferrous sulfate  325 mg Oral Every Other Day    levothyroxine  88 mcg Oral Daily    [Held by provider] rivaroxaban  15 mg Oral Daily    [Held by provider] amLODIPine  5 mg Oral Daily    morphine  4 mg IntraVENous Once    ondansetron  4 mg IntraVENous Once    sodium chloride flush  5-40 mL IntraVENous 2 times per day    piperacillin-tazobactam  3,375 mg IntraVENous Q8H     PRN Meds: sodium chloride flush, sodium chloride, ondansetron **OR** ondansetron, polyethylene glycol, acetaminophen **OR** acetaminophen, ketorolac      Intake/Output Summary (Last 24 hours) at 7/21/2022 1312  Last data filed at 7/20/2022 2028  Gross per 24 hour   Intake 236.47 ml   Output --   Net 236.47 ml         Exam:    /75   Pulse 61   Temp 98.2 °F (36.8 °C) (Oral)   Resp 18   Ht 5' 6\" (1.676 m)   Wt 164 lb 6.4 oz (74.6 kg)   SpO2 99%   BMI 26.53 kg/m²     General appearance: No apparent distress, appears stated age and cooperative. HEENT:  Conjunctivae/corneas clear. Neck: Trachea midline. Respiratory:  Normal respiratory effort.  Clear to auscultation  Cardiovascular: Regular rate and rhythm  Abdomen: RLQ tenderness  Musculoskeletal: No clubbing, cyanosis or edema bilaterally  Neuro: Non Focal.   Capillary Refill: Brisk,< 3 seconds   Peripheral Pulses: +2 palpable, equal bilaterally     Labs:   Recent Labs     07/19/22  0614 07/20/22  0628 07/21/22  0620   WBC 15.5* 15.3* 11.8*   HGB 9.3* 9.4* 10.0*   HCT 28.6* 28.4* 30.2*    328 335       Recent Labs     07/20/22  0119 07/20/22  0628 07/21/22  0620    139 139   K 2.9* 3.9 3.5    104 103   CO2 25 26 25   BUN 15 14 11   CREATININE 0.63 0.57 0.45*   CALCIUM 8.5 8.6 8.5       Recent Labs     07/19/22  0614 07/20/22  0628 07/21/22  0620   AST 27 25 26   ALT 27 26 24   BILITOT 0.6 0.5 0.4   ALKPHOS 56 56 59       No results for input(s): INR in the last 72 hours. No results for input(s): Shahriar Pears in the last 72 hours. Urinalysis:      Lab Results   Component Value Date/Time    NITRU Negative 07/18/2022 12:30 PM    WBCUA 20-50 07/18/2022 12:30 PM    BACTERIA Negative 07/18/2022 12:30 PM    RBCUA 6-10 07/18/2022 12:30 PM    BLOODU SMALL 07/18/2022 12:30 PM    SPECGRAV 1.044 07/18/2022 12:30 PM    GLUCOSEU Negative 07/18/2022 12:30 PM       Radiology:  CT ABDOMEN PELVIS WO CONTRAST Additional Contrast? Oral   Final Result      Complex 10.0 x 8.0 x 10.6 cm mass with septated central low attenuation fluid collections as discussed. Mass displaces cecum and ascending colon anteriorly. Differential includes malignancy including appendiceal mucocele/carcinoma, as well as    retroperitoneal sarcoma with central necrotic change. Infection/abscess secondary consideration, minimal presence of adjacent inflammatory change. 8.0 x 5.8 x 7.5 cm left adnexal cysts as discussed, most likely ovarian in etiology. If clinical concern warrants, pelvic sonography may be obtained for further evaluation. Other findings discussed. All CT scans at this facility use dose modulation, iterative reconstruction, and/or weight based dosing when appropriate to reduce radiation dose to as low as reasonably achievable.                CT ABDOMEN PELVIS W IV CONTRAST Additional Contrast? None   Final Result   Impression: 1. In the right lower quadrant of the abdomen, pericolic abscesses versus pericolic mass is seen. This is thought more likely abscess. 2. A cystic mass is again seen in the pelvis and is likely in the ovary. It is unchanged from previous examination   The patient will be given oral contrast and rescanned to evaluate the right lower quadrant. All CT scans at this facility use dose modulation, iterative reconstruction, and/or weight based dosing                                                 CT Head WO Contrast   Final Result   1. Age-appropriate cortical atrophy and periventricular microangiopathy. When compared to previous study there has been no significant interval change. 2. No acute hemorrhage or extra-axial fluid collection         All CT scans at this facility use dose modulation, iterative reconstruction, and/or weight based dosing when appropriate to reduce radiation dose to as low as reasonably achievable. Assessment/Plan:         Abdominal pain - mass vs abscess       - CEA was negative;  is elevated indicting possible ovarian etiology; family is considering IR guided biopsy; will consult oncology for their opinion       [facility]) -Continue home meds; hold anticoagulation pending procedure; cardiology is assisting with management       Hyponatremia -Resolved.        Hypokalemia -Replete       Anemia - Continue home meds       Essential hypertension -Continue home meds       CHF (congestive heart failure) ([facility] -Continue dmitri emeds       Hypothyroid -Continue home meds       Active Hospital Problems    Diagnosis Date Noted    [facility]) [I48.91]      Priority: High    Abdominal mass [R19.00] 07/19/2022     Priority: Medium    Abdominal pain [R10.9] 07/18/2022     Priority: Medium    Hyponatremia [E87.1] 07/18/2022     Priority: Medium    Hypokalemia [E87.6] 07/18/2022     Priority: Medium    Anemia [D64.9] 07/18/2022     Priority: Medium    CHF (congestive heart failure) ([facility] [I50.9] 07/18/2022     Priority: Medium    Hypothyroid [E03.9] 07/18/2022     Priority: Medium    Essential hypertension [I10] 02/16/2018        Additional work up or/and treatment plan may be added today or then after based on clinical progression. I am managing a portion of pt care. Some medical issues are handled by other specialists. Additional work up and treatment should be done in out pt setting by pt PCP and other out pt providers. In addition to examining and evaluating pt, I spent additional time explaining care, normal and abnormal findings, and treatment plan. All of pt questions were answered. Counseling, diet and education were  provided. Case will be discussed with nursing staff when appropriate. Family will be updated if and when appropriate.       Diet: Diet NPO    Code Status: Full Code    PT/OT Eval     Electronically signed by Ilene Mera MD on 7/21/2022 at 1:12 PM

## 2022-07-21 NOTE — BRIEF OP NOTE
Preliminary  Procedure Note, Full Note To Follow in PACS  Vascular and Interventional Radiology      Thuy Castillo  7/12/1932  [id number]    Date of Procedure: 07/21/22    Physician: Lali Cannon MD, DABR    Pre-Op Diagnosis: Right colonic mass vs abscess    Post-Op Diagnosis: Same       Procedure: Drainage and drain placement in fluid collection    Estimated Blood Loss (mL): Minimal    Complications: None    Specimens: Yellow fluid sent for cytology and microbiology    Implants: None    Drains: 10 Occitan drain    Findings: Right colonic mass with fluid collection internally was drained and 10 Occitan drain placed     Electronically signed by Willem Romo MD on 7/21/2022 at 4:24 PM

## 2022-07-21 NOTE — FLOWSHEET NOTE
Pt awake in bed. Respirations even and nonlabored on 3LNC. Pt denies pain currently. Son is here. Perfect serve sent to Dr. Nader Amin as son is asking to speak with him. Dr. Christopher Camarena was here and spoke with son. 1050 Pt incontinent urine and BM. Depends and linen change done.

## 2022-07-22 ENCOUNTER — HOSPITAL ENCOUNTER (INPATIENT)
Age: 87
LOS: 7 days | Discharge: ANOTHER HOSPITAL | DRG: 074 | End: 2022-07-29
Attending: PHYSICAL MEDICINE & REHABILITATION | Admitting: PHYSICAL MEDICINE & REHABILITATION
Payer: MEDICARE

## 2022-07-22 VITALS
HEIGHT: 66 IN | RESPIRATION RATE: 16 BRPM | WEIGHT: 164.4 LBS | TEMPERATURE: 97.7 F | OXYGEN SATURATION: 98 % | HEART RATE: 77 BPM | BODY MASS INDEX: 26.42 KG/M2 | DIASTOLIC BLOOD PRESSURE: 82 MMHG | SYSTOLIC BLOOD PRESSURE: 124 MMHG

## 2022-07-22 PROBLEM — H34.8122 CENTRAL RETINAL VEIN OCCLUSION, LEFT EYE, STABLE: Status: ACTIVE | Noted: 2021-04-12

## 2022-07-22 LAB
ALBUMIN SERPL-MCNC: 2.4 G/DL (ref 3.5–4.6)
ALP BLD-CCNC: 55 U/L (ref 40–130)
ALT SERPL-CCNC: 16 U/L (ref 0–33)
ANION GAP SERPL CALCULATED.3IONS-SCNC: 13 MEQ/L (ref 9–15)
AST SERPL-CCNC: 21 U/L (ref 0–35)
BASOPHILS ABSOLUTE: 0 K/UL (ref 0–0.2)
BASOPHILS RELATIVE PERCENT: 0.4 %
BILIRUB SERPL-MCNC: 0.5 MG/DL (ref 0.2–0.7)
BUN BLDV-MCNC: 7 MG/DL (ref 8–23)
CA 19-9: 7 U/ML (ref 0–35)
CALCIUM SERPL-MCNC: 7.9 MG/DL (ref 8.5–9.9)
CARCINOEMBRYONIC ANTIGEN: 1.2 NG/ML
CHLORIDE BLD-SCNC: 101 MEQ/L (ref 95–107)
CO2: 23 MEQ/L (ref 20–31)
CREAT SERPL-MCNC: 0.3 MG/DL (ref 0.5–0.9)
EOSINOPHILS ABSOLUTE: 0.3 K/UL (ref 0–0.7)
EOSINOPHILS RELATIVE PERCENT: 2.9 %
GFR AFRICAN AMERICAN: >60
GFR NON-AFRICAN AMERICAN: >60
GLOBULIN: 3.6 G/DL (ref 2.3–3.5)
GLUCOSE BLD-MCNC: 102 MG/DL (ref 70–99)
HCT VFR BLD CALC: 33.1 % (ref 37–47)
HEMOGLOBIN: 10.3 G/DL (ref 12–16)
LYMPHOCYTES ABSOLUTE: 0.6 K/UL (ref 1–4.8)
LYMPHOCYTES RELATIVE PERCENT: 5.5 %
MAGNESIUM: 1.7 MG/DL (ref 1.7–2.4)
MCH RBC QN AUTO: 26.8 PG (ref 27–31.3)
MCHC RBC AUTO-ENTMCNC: 31.2 % (ref 33–37)
MCV RBC AUTO: 85.8 FL (ref 82–100)
MONOCYTES ABSOLUTE: 0.7 K/UL (ref 0.2–0.8)
MONOCYTES RELATIVE PERCENT: 6.7 %
NEUTROPHILS ABSOLUTE: 9.4 K/UL (ref 1.4–6.5)
NEUTROPHILS RELATIVE PERCENT: 84.5 %
PDW BLD-RTO: 14.4 % (ref 11.5–14.5)
PLATELET # BLD: 344 K/UL (ref 130–400)
POTASSIUM REFLEX MAGNESIUM: 3.3 MEQ/L (ref 3.4–4.9)
RBC # BLD: 3.85 M/UL (ref 4.2–5.4)
SARS-COV-2, NAAT: NOT DETECTED
SODIUM BLD-SCNC: 137 MEQ/L (ref 135–144)
TOTAL PROTEIN: 6 G/DL (ref 6.3–8)
WBC # BLD: 11.1 K/UL (ref 4.8–10.8)

## 2022-07-22 PROCEDURE — 1180000000 HC REHAB R&B

## 2022-07-22 PROCEDURE — 87635 SARS-COV-2 COVID-19 AMP PRB: CPT

## 2022-07-22 PROCEDURE — 97166 OT EVAL MOD COMPLEX 45 MIN: CPT

## 2022-07-22 PROCEDURE — 2700000000 HC OXYGEN THERAPY PER DAY

## 2022-07-22 PROCEDURE — 97162 PT EVAL MOD COMPLEX 30 MIN: CPT

## 2022-07-22 PROCEDURE — 99221 1ST HOSP IP/OBS SF/LOW 40: CPT | Performed by: PHYSICAL MEDICINE & REHABILITATION

## 2022-07-22 PROCEDURE — 80053 COMPREHEN METABOLIC PANEL: CPT

## 2022-07-22 PROCEDURE — 2580000003 HC RX 258: Performed by: INTERNAL MEDICINE

## 2022-07-22 PROCEDURE — 36415 COLL VENOUS BLD VENIPUNCTURE: CPT

## 2022-07-22 PROCEDURE — 2580000003 HC RX 258

## 2022-07-22 PROCEDURE — 85025 COMPLETE CBC W/AUTO DIFF WBC: CPT

## 2022-07-22 PROCEDURE — 2500000003 HC RX 250 WO HCPCS: Performed by: INTERNAL MEDICINE

## 2022-07-22 PROCEDURE — 6360000002 HC RX W HCPCS: Performed by: INTERNAL MEDICINE

## 2022-07-22 PROCEDURE — 83735 ASSAY OF MAGNESIUM: CPT

## 2022-07-22 PROCEDURE — 6360000002 HC RX W HCPCS

## 2022-07-22 PROCEDURE — 6370000000 HC RX 637 (ALT 250 FOR IP): Performed by: INTERNAL MEDICINE

## 2022-07-22 RX ORDER — LEVOTHYROXINE SODIUM 88 UG/1
88 TABLET ORAL DAILY
Status: DISCONTINUED | OUTPATIENT
Start: 2022-07-23 | End: 2022-07-29 | Stop reason: HOSPADM

## 2022-07-22 RX ORDER — CITALOPRAM 10 MG/1
20 TABLET ORAL DAILY
Status: DISCONTINUED | OUTPATIENT
Start: 2022-07-23 | End: 2022-07-29 | Stop reason: HOSPADM

## 2022-07-22 RX ORDER — LEVOTHYROXINE SODIUM 88 UG/1
88 TABLET ORAL DAILY
Status: CANCELLED | OUTPATIENT
Start: 2022-07-23

## 2022-07-22 RX ORDER — ONDANSETRON 4 MG/1
4 TABLET, ORALLY DISINTEGRATING ORAL EVERY 8 HOURS PRN
Status: DISCONTINUED | OUTPATIENT
Start: 2022-07-22 | End: 2022-07-29 | Stop reason: HOSPADM

## 2022-07-22 RX ORDER — KETOROLAC TROMETHAMINE 30 MG/ML
15 INJECTION, SOLUTION INTRAMUSCULAR; INTRAVENOUS EVERY 6 HOURS PRN
Status: ACTIVE | OUTPATIENT
Start: 2022-07-22 | End: 2022-07-24

## 2022-07-22 RX ORDER — ONDANSETRON 2 MG/ML
4 INJECTION INTRAMUSCULAR; INTRAVENOUS EVERY 6 HOURS PRN
Status: CANCELLED | OUTPATIENT
Start: 2022-07-22

## 2022-07-22 RX ORDER — METOPROLOL TARTRATE 5 MG/5ML
5 INJECTION INTRAVENOUS EVERY 6 HOURS
Status: CANCELLED | OUTPATIENT
Start: 2022-07-22

## 2022-07-22 RX ORDER — SODIUM CHLORIDE 9 MG/ML
INJECTION, SOLUTION INTRAVENOUS PRN
Status: CANCELLED | OUTPATIENT
Start: 2022-07-22

## 2022-07-22 RX ORDER — ACETAMINOPHEN 325 MG/1
650 TABLET ORAL EVERY 6 HOURS PRN
Status: DISCONTINUED | OUTPATIENT
Start: 2022-07-22 | End: 2022-07-29 | Stop reason: HOSPADM

## 2022-07-22 RX ORDER — SODIUM CHLORIDE 0.9 % (FLUSH) 0.9 %
5-40 SYRINGE (ML) INJECTION PRN
Status: CANCELLED | OUTPATIENT
Start: 2022-07-22

## 2022-07-22 RX ORDER — ACETAMINOPHEN 650 MG/1
650 SUPPOSITORY RECTAL EVERY 6 HOURS PRN
Status: DISCONTINUED | OUTPATIENT
Start: 2022-07-22 | End: 2022-07-28

## 2022-07-22 RX ORDER — POLYETHYLENE GLYCOL 3350 17 G/17G
17 POWDER, FOR SOLUTION ORAL DAILY PRN
Status: CANCELLED | OUTPATIENT
Start: 2022-07-22

## 2022-07-22 RX ORDER — KETOROLAC TROMETHAMINE 15 MG/ML
15 INJECTION, SOLUTION INTRAMUSCULAR; INTRAVENOUS EVERY 6 HOURS PRN
Status: CANCELLED | OUTPATIENT
Start: 2022-07-22 | End: 2022-07-23

## 2022-07-22 RX ORDER — SODIUM CHLORIDE 9 MG/ML
INJECTION, SOLUTION INTRAVENOUS PRN
Status: DISCONTINUED | OUTPATIENT
Start: 2022-07-22 | End: 2022-07-29 | Stop reason: HOSPADM

## 2022-07-22 RX ORDER — CITALOPRAM 10 MG/1
20 TABLET ORAL DAILY
Status: CANCELLED | OUTPATIENT
Start: 2022-07-23

## 2022-07-22 RX ORDER — FERROUS SULFATE 325(65) MG
325 TABLET ORAL EVERY OTHER DAY
Status: DISCONTINUED | OUTPATIENT
Start: 2022-07-23 | End: 2022-07-29 | Stop reason: HOSPADM

## 2022-07-22 RX ORDER — ACETAMINOPHEN 650 MG/1
650 SUPPOSITORY RECTAL EVERY 6 HOURS PRN
Status: CANCELLED | OUTPATIENT
Start: 2022-07-22

## 2022-07-22 RX ORDER — SODIUM CHLORIDE 0.9 % (FLUSH) 0.9 %
5-40 SYRINGE (ML) INJECTION EVERY 12 HOURS SCHEDULED
Status: DISCONTINUED | OUTPATIENT
Start: 2022-07-22 | End: 2022-07-29 | Stop reason: HOSPADM

## 2022-07-22 RX ORDER — ONDANSETRON 2 MG/ML
4 INJECTION INTRAMUSCULAR; INTRAVENOUS EVERY 6 HOURS PRN
Status: DISCONTINUED | OUTPATIENT
Start: 2022-07-22 | End: 2022-07-29 | Stop reason: HOSPADM

## 2022-07-22 RX ORDER — SODIUM CHLORIDE 0.9 % (FLUSH) 0.9 %
5-40 SYRINGE (ML) INJECTION PRN
Status: DISCONTINUED | OUTPATIENT
Start: 2022-07-22 | End: 2022-07-29 | Stop reason: HOSPADM

## 2022-07-22 RX ORDER — POLYETHYLENE GLYCOL 3350 17 G/17G
17 POWDER, FOR SOLUTION ORAL DAILY PRN
Status: DISCONTINUED | OUTPATIENT
Start: 2022-07-22 | End: 2022-07-29 | Stop reason: HOSPADM

## 2022-07-22 RX ORDER — METOPROLOL TARTRATE 5 MG/5ML
5 INJECTION INTRAVENOUS EVERY 6 HOURS
Status: DISCONTINUED | OUTPATIENT
Start: 2022-07-22 | End: 2022-07-24

## 2022-07-22 RX ORDER — ACETAMINOPHEN 325 MG/1
650 TABLET ORAL EVERY 6 HOURS PRN
Status: CANCELLED | OUTPATIENT
Start: 2022-07-22

## 2022-07-22 RX ORDER — SODIUM CHLORIDE 0.9 % (FLUSH) 0.9 %
5-40 SYRINGE (ML) INJECTION EVERY 12 HOURS SCHEDULED
Status: CANCELLED | OUTPATIENT
Start: 2022-07-22

## 2022-07-22 RX ORDER — ONDANSETRON 4 MG/1
4 TABLET, ORALLY DISINTEGRATING ORAL EVERY 8 HOURS PRN
Status: CANCELLED | OUTPATIENT
Start: 2022-07-22

## 2022-07-22 RX ORDER — FERROUS SULFATE 325(65) MG
325 TABLET ORAL EVERY OTHER DAY
Status: CANCELLED | OUTPATIENT
Start: 2022-07-23

## 2022-07-22 RX ADMIN — PIPERACILLIN AND TAZOBACTAM 3375 MG: 3; .375 INJECTION, POWDER, LYOPHILIZED, FOR SOLUTION INTRAVENOUS at 13:41

## 2022-07-22 RX ADMIN — PIPERACILLIN AND TAZOBACTAM 3375 MG: 3; .375 INJECTION, POWDER, LYOPHILIZED, FOR SOLUTION INTRAVENOUS at 20:24

## 2022-07-22 RX ADMIN — Medication 10 ML: at 10:19

## 2022-07-22 RX ADMIN — Medication 10 ML: at 20:26

## 2022-07-22 RX ADMIN — SODIUM CHLORIDE, POTASSIUM CHLORIDE, SODIUM LACTATE AND CALCIUM CHLORIDE: 600; 310; 30; 20 INJECTION, SOLUTION INTRAVENOUS at 01:02

## 2022-07-22 RX ADMIN — METOPROLOL TARTRATE 5 MG: 1 INJECTION, SOLUTION INTRAVENOUS at 06:29

## 2022-07-22 RX ADMIN — KETOROLAC TROMETHAMINE 15 MG: 15 INJECTION, SOLUTION INTRAMUSCULAR; INTRAVENOUS at 15:14

## 2022-07-22 RX ADMIN — CITALOPRAM HYDROBROMIDE 20 MG: 10 TABLET ORAL at 09:26

## 2022-07-22 RX ADMIN — LEVOTHYROXINE SODIUM 88 MCG: 88 TABLET ORAL at 09:26

## 2022-07-22 RX ADMIN — PIPERACILLIN AND TAZOBACTAM 3375 MG: 3; .375 INJECTION, POWDER, LYOPHILIZED, FOR SOLUTION INTRAVENOUS at 04:55

## 2022-07-22 RX ADMIN — METOPROLOL TARTRATE 5 MG: 1 INJECTION, SOLUTION INTRAVENOUS at 13:41

## 2022-07-22 ASSESSMENT — ENCOUNTER SYMPTOMS
SORE THROAT: 0
COUGH: 0
STRIDOR: 0
SHORTNESS OF BREATH: 1
CONSTIPATION: 1
BLOOD IN STOOL: 0
PHOTOPHOBIA: 0
BACK PAIN: 1
DIARRHEA: 0
WHEEZING: 1
VOMITING: 0
NAUSEA: 0
EYE REDNESS: 0
ABDOMINAL PAIN: 0
EYE PAIN: 0

## 2022-07-22 ASSESSMENT — PAIN DESCRIPTION - LOCATION: LOCATION: ABDOMEN

## 2022-07-22 ASSESSMENT — PAIN SCALES - GENERAL
PAINLEVEL_OUTOF10: 2
PAINLEVEL_OUTOF10: 9

## 2022-07-22 NOTE — PROGRESS NOTES
INF DIS CONSULT CALLED TO DR NUNES [facility] VIA ANSWERING SERVICE Electronically signed by Kevin Cobian on 7/22/2022 at 11:07 AM

## 2022-07-22 NOTE — CARE COORDINATION
This LSW spoke with patient and daughter at bedside this am. D/C plans discussed. D/C PLAN : [facility] - PATIENT TO BE EVALUATED PER PIERRE ROCHA / [id number] [address] / SWATHI TO FOLLOW.   Electronically signed by Ona Gosselin, MSW, LSW on 7/22/22 at 11:48 AM EDT

## 2022-07-22 NOTE — PROGRESS NOTES
Hospitalist Progress Note      PCP: Tom Marcos MD    Date of Admission: 7/18/2022    Chief Complaint:    Chief Complaint   Patient presents with    Abdominal Pain       Subjective:  Patient denies fevers, chills, sweats, CP, SOB, diarrhea or burning micturition. Is doing well. 12 point ROS negative other than mentioned above     Medications:  Reviewed    Infusion Medications    sodium chloride       Scheduled Medications    metoprolol  5 mg IntraVENous Q6H    [Held by provider] carvedilol  6.25 mg Oral BID    citalopram  20 mg Oral Daily    [Held by provider] digoxin  125 mcg Oral Daily    ferrous sulfate  325 mg Oral Every Other Day    levothyroxine  88 mcg Oral Daily    [Held by provider] rivaroxaban  15 mg Oral Daily    [Held by provider] amLODIPine  5 mg Oral Daily    morphine  4 mg IntraVENous Once    ondansetron  4 mg IntraVENous Once    sodium chloride flush  5-40 mL IntraVENous 2 times per day    piperacillin-tazobactam  3,375 mg IntraVENous Q8H     PRN Meds: sodium chloride flush, sodium chloride, ondansetron **OR** ondansetron, polyethylene glycol, acetaminophen **OR** acetaminophen, ketorolac      Intake/Output Summary (Last 24 hours) at 7/22/2022 1306  Last data filed at 7/22/2022 1136  Gross per 24 hour   Intake 1515.63 ml   Output 10 ml   Net 1505.63 ml         Exam:    /82   Pulse 77   Temp 97.7 °F (36.5 °C) (Oral)   Resp 16   Ht 5' 6\" (1.676 m)   Wt 164 lb 6.4 oz (74.6 kg)   SpO2 98%   BMI 26.53 kg/m²     General appearance: No apparent distress, appears stated age and cooperative. HEENT:  Conjunctivae/corneas clear. Neck: Trachea midline. Respiratory:  Normal respiratory effort.  Clear to auscultation  Cardiovascular: Regular rate and rhythm  Abdomen: No longer tender; drain in place  Musculoskeletal: No clubbing, cyanosis or edema bilaterally  Neuro: Non Focal.   Capillary Refill: Brisk,< 3 seconds   Peripheral Pulses: +2 palpable, equal bilaterally     Labs:   Recent Labs 07/20/22  0628 07/21/22  0620 07/22/22  0750   WBC 15.3* 11.8* 11.1*   HGB 9.4* 10.0* 10.3*   HCT 28.4* 30.2* 33.1*    335 344       Recent Labs     07/20/22 0628 07/21/22  0620 07/22/22  0750    139 137   K 3.9 3.5 3.3*    103 101   CO2 26 25 23   BUN 14 11 7*   CREATININE 0.57 0.45* 0.30*   CALCIUM 8.6 8.5 7.9*       Recent Labs     07/20/22 0628 07/21/22  0620 07/22/22  0750   AST 25 26 21   ALT 26 24 16   BILITOT 0.5 0.4 0.5   ALKPHOS 56 59 55       No results for input(s): INR in the last 72 hours. No results for input(s): Nicko Shorts in the last 72 hours. Urinalysis:      Lab Results   Component Value Date/Time    NITRU Negative 07/18/2022 12:30 PM    WBCUA 20-50 07/18/2022 12:30 PM    BACTERIA Negative 07/18/2022 12:30 PM    RBCUA 6-10 07/18/2022 12:30 PM    BLOODU SMALL 07/18/2022 12:30 PM    SPECGRAV 1.044 07/18/2022 12:30 PM    GLUCOSEU Negative 07/18/2022 12:30 PM       Radiology:  CT GUIDED NEEDLE PLACEMENT   Final Result   1. Successful drainage and drain placement of right colonic soft tissue mass with central fluid collection. 2.Fluid was aspirated and sent for microbiology and cytology analysis. Additionally a 10 Greek drain was placed yielding a thick red to yellow fluid with internal yellow debris. HISTORY: Urban Records is a Female of 80 years age. DIAGNOSIS:   Right abdominal mass with possible fluid collection vs necrotic tissue       COMPARISON: None available. CT Dose-Length Product (estimate related to radiation exposure from this exam):  541.6  mGy*cm. PROCEDURE:   Following the discussion of the procedure, alternatives, risks versus benefits, informed consent was obtained from the patient. Specifically, risks of after-biopsy pain at the site, rare possibility of excessive hemorrhage, infection, injury to the    adjacent organs were discussed and the patient verbalized understanding.       Pre-procedure evaluation confirmed that the patient was an appropriate candidate for conscious    sedation. Adequate sedation was maintained during the entire procedure. Vital signs, pulse    oximetry, and response to verbal commands were monitored and recorded by the nurse throughout the    procedure and the recovery period. Medical information was entered in the medical record including the    medications and dosages used. The patient returned to baseline neurologic and physiologic status    prior to leaving the department. No immediate sedation related complications were noted. Medication    for conscious sedation was administered via IV route. 45 minutes of conscious sedation was    provided. Following universal protocol, patient and site verification was performed with a \"timeout\" prior to the procedure. The patient was placed on the CT table in supine  position and the right lateral abdomen area was prepped and draped in usual sterile fashion. Using the usual sterile conditions, lidocaine and CT guidance, the fluid collection within soft tissue density    was accessed using a Yueh needle. After confirmation of appropriate localization of the needle, aspiration yielded a thick red to yellow fluid which was sent for microbiology and cytology analysis. Following that an Amplatz wire was placed through the    Yueh sheath into the abscess under CT guidance. The tract was serially dilated with 6, 8, and 10 [place] dilators respectively. Following that a 10 Cameroonian drainage catheter was advanced over the wire into the abscess under CT guidance and the anchoring    loop was formed. Catheter was sutured to the skin and and secured with Percufix device. The patient tolerated the procedure well. No immediate complications identified. The patient left the CT suite in supine position to the recovery room in stable    condition. Total local anesthetic used: lidocaine, approximately 15 mL. Estimated blood loss:  None. CT ABSCESS DRAINAGE W CATH PLACEMENT S&I   Final Result   1. Successful drainage and drain placement of right colonic soft tissue mass with central fluid collection. 2.Fluid was aspirated and sent for microbiology and cytology analysis. Additionally a 10 Scottish drain was placed yielding a thick red to yellow fluid with internal yellow debris. HISTORY: Mario Mueller is a Female of 80 years age. DIAGNOSIS:   Right abdominal mass with possible fluid collection vs necrotic tissue       COMPARISON: None available. CT Dose-Length Product (estimate related to radiation exposure from this exam):  541.6  mGy*cm. PROCEDURE:   Following the discussion of the procedure, alternatives, risks versus benefits, informed consent was obtained from the patient. Specifically, risks of after-biopsy pain at the site, rare possibility of excessive hemorrhage, infection, injury to the    adjacent organs were discussed and the patient verbalized understanding. Pre-procedure evaluation confirmed that the patient was an appropriate candidate for conscious    sedation. Adequate sedation was maintained during the entire procedure. Vital signs, pulse    oximetry, and response to verbal commands were monitored and recorded by the nurse throughout the    procedure and the recovery period. Medical information was entered in the medical record including the    medications and dosages used. The patient returned to baseline neurologic and physiologic status    prior to leaving the department. No immediate sedation related complications were noted. Medication    for conscious sedation was administered via IV route. 45 minutes of conscious sedation was    provided. Following universal protocol, patient and site verification was performed with a \"timeout\" prior to the procedure.        The patient was placed on the CT table in supine  position and the right lateral abdomen area was prepped and draped in usual sterile fashion. Using the usual sterile conditions, lidocaine and CT guidance, the fluid collection within soft tissue density    was accessed using a Yueh needle. After confirmation of appropriate localization of the needle, aspiration yielded a thick red to yellow fluid which was sent for microbiology and cytology analysis. Following that an Amplatz wire was placed through the    Yueh sheath into the abscess under CT guidance. The tract was serially dilated with 6, 8, and 10 [place] dilators respectively. Following that a 10 Angolan drainage catheter was advanced over the wire into the abscess under CT guidance and the anchoring    loop was formed. Catheter was sutured to the skin and and secured with Percufix device. The patient tolerated the procedure well. No immediate complications identified. The patient left the CT suite in supine position to the recovery room in stable    condition. Total local anesthetic used: lidocaine, approximately 15 mL. Estimated blood loss:  None. CT ABDOMEN PELVIS WO CONTRAST Additional Contrast? Oral   Final Result      Complex 10.0 x 8.0 x 10.6 cm mass with septated central low attenuation fluid collections as discussed. Mass displaces cecum and ascending colon anteriorly. Differential includes malignancy including appendiceal mucocele/carcinoma, as well as    retroperitoneal sarcoma with central necrotic change. Infection/abscess secondary consideration, minimal presence of adjacent inflammatory change. 8.0 x 5.8 x 7.5 cm left adnexal cysts as discussed, most likely ovarian in etiology. If clinical concern warrants, pelvic sonography may be obtained for further evaluation. Other findings discussed. All CT scans at this facility use dose modulation, iterative reconstruction, and/or weight based dosing when appropriate to reduce radiation dose to as low as reasonably achievable.                CT ABDOMEN PELVIS W IV CONTRAST Additional Contrast? None   Final Result   Impression:   1. In the right lower quadrant of the abdomen, pericolic abscesses versus pericolic mass is seen. This is thought more likely abscess. 2. A cystic mass is again seen in the pelvis and is likely in the ovary. It is unchanged from previous examination   The patient will be given oral contrast and rescanned to evaluate the right lower quadrant. All CT scans at this facility use dose modulation, iterative reconstruction, and/or weight based dosing                                                 CT Head WO Contrast   Final Result   1. Age-appropriate cortical atrophy and periventricular microangiopathy. When compared to previous study there has been no significant interval change. 2. No acute hemorrhage or extra-axial fluid collection         All CT scans at this facility use dose modulation, iterative reconstruction, and/or weight based dosing when appropriate to reduce radiation dose to as low as reasonably achievable. Assessment/Plan:         Abdominal pain - mass vs abscess       - CEA was negative;        - oncology consulted for their opinion      - initial results appear that it may be an abscess; continue IV Abx; consult placed to ID for Abx recommendations          - ok for d/c to acute rehab if precert obtained             A-fib ([address] -Continue home meds; resume anticoagulation when ok with IR; cardiology is assisting with management       Hyponatremia -Resolved.        Hypokalemia -Replete       Anemia - Continue home meds       Essential hypertension -Continue home meds       CHF (congestive heart failure) ([address] -Continue dmitri emeds       Hypothyroid -Continue home meds       Active Hospital Problems    Diagnosis Date Noted    A-fib [facility]) [I48.91]      Priority: High    Impaired mobility and activities of daily living due to CHF exac, [facility] re-admit 2/19/2021 [Z74.09, Z78.9]      Priority: High    Abdominal mass [R19.00] 07/19/2022 Priority: Medium    Right lower quadrant abdominal pain [R10.31] 07/18/2022     Priority: Medium    Hyponatremia [E87.1] 07/18/2022     Priority: Medium    Hypokalemia [E87.6] 07/18/2022     Priority: Medium    Anemia [D64.9] 07/18/2022     Priority: Medium    CHF (congestive heart failure) ([facility] [address] [I50.9] 07/18/2022     Priority: Medium    Hypothyroid [E03.9] 07/18/2022     Priority: Medium    Essential hypertension [I10] 02/16/2018    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 I am managing a portion of pt care. Some medical issues are handled by other specialists. Additional work up and treatment should be done in out pt setting by pt PCP and other out pt providers. In addition to examining and evaluating pt, I spent additional time explaining care, normal and abnormal findings, and treatment plan. All of pt questions were answered. Counseling, diet and education were  provided. Case will be discussed with nursing staff when appropriate. Family will be updated if and when appropriate. Diet: ADULT DIET;  Regular  ADULT ORAL NUTRITION SUPPLEMENT; PM Snack, Lunch, Dinner; Standard High Calorie/High Protein Oral Supplement    Code Status: Full Code    PT/OT Eval     Electronically signed by Harman Aiken MD on 7/22/2022 at 1:06 PM

## 2022-07-22 NOTE — CONSULTS
Hematology/Oncology Consult  Encounter Date: 2022 4:30 PM    Ms. Arits Matos is a 80 y.o. female  : 1932  MRN: [id number]  Celio Number: [de-identified]  Requesting Provider: Dr. Mary Grace Jimenez    Reason for request: Colonic vs ovarian mass     CONSULTANT: Esau Wheeler MD    HPI:The pt is a 79 yo female who developed epigastric and right -sided abd pain. Ct abd/pelvis more suspicious of  pericolic abscess than tumor. Drain was placed with improvement in the lower abd pain.       She was also started on IV Zosyn      Patient Active Problem List   Diagnosis    Lumbar stenosis with neurogenic claudication    Acquired scoliosis    Osteoporosis    Charcot Arleen Tooth muscular atrophy    Excess weight    Osteoarthritis of lumbar spine with myelopathy    Osteoarthritis    Myelopathy (HCC)    Impaired mobility and activities of daily living due to CHF [facility] re-admit 2021    Headache    Depression    Ascending aortic aneurysm (HCC)    Descending aortic aneurysm (HCC)    Dizziness    Shortness of breath    Essential hypertension    Acute on chronic combined systolic and diastolic CHF (congestive heart failure) (HCC)    Gait abnormality    A-fib (HCC)    Pleural effusion    Hypoxia    Acute pulmonary edema (HCC)    Acute on chronic combined systolic (congestive) and diastolic (congestive) heart failure (HCC)    Impaired mobility    Acute cystitis with hematuria    Chronic diastolic CHF (congestive heart failure) (HCC)    Right lower quadrant abdominal pain    Hyponatremia    Hypokalemia    Anemia    CHF (congestive heart failure) (HCC)    Hypothyroid    Abdominal mass    Central retinal vein occlusion, left eye, stable     Past Medical History:   Diagnosis Date    Ascending aortic aneurysm ([address] 2017    Charcot Arleen Tooth muscular atrophy     CHF (congestive heart failure) (HCC)     Chronic diastolic CHF (congestive heart failure) ([address] 2022    Depression     Descending aortic aneurysm ([facility] 6/23/2017    Essential hypertension 2/16/2018    Headache     HTN (hypertension)     Impaired mobility and activities of daily living     Lumbar stenosis with neurogenic claudication     Myelopathy ([address]     Osteoarthritis      Past Surgical History:   Procedure Laterality Date    JOINT REPLACEMENT Bilateral     knees    OTHER SURGICAL HISTORY Right 07/21/2022    cat scan guided abdominal mass aspiration with drain placement by Dr. Galicia Brine Left 02/25/2021    LEFT PLEURAL CATHETER INSERTION performed by Rain Mendez MD at [place] [id number] Left 01/29/2021    total of 755 cc removed per Dr Abhishek Jimenez specimen sent to lab     Family History   Problem Relation Age of Onset    Arthritis Mother     Arthritis Father     High Blood Pressure Father      Social History     Socioeconomic History    Marital status:       Spouse name: Not on file    Number of children: Not on file    Years of education: Not on file    Highest education level: Not on file   Occupational History    Not on file   Tobacco Use    Smoking status: Never    Smokeless tobacco: Never   Substance and Sexual Activity    Alcohol use: No     Alcohol/week: 0.0 standard drinks    Drug use: No    Sexual activity: Not on file   Other Topics Concern    Not on file   Social History Narrative    , Lives With: Alone, son lives down the street, dtr is in the area    Type of Home: [facility]  in [address]: One level    Home Access: Stairs to enter with rails- Number of Steps: 2- Rails: Both    Bathroom Shower/Tub: Tub/Shower unit, Bathroom Equipment: Grab bars in shower, Shower chair    Home Equipment: Rolling walker, Cane(Pt infrequemtly uses DME for ambulation and prefers to furniture walk in home)    ADL Assistance: Independent, [address]: Independent    Homemaking Responsibilities: Yes    Ambulation Assistance: Independent, Transfer Assistance: Independent    Additional Comments: Son stops over 2 times daily         Social Determinants of Health     Financial Resource Strain: Not on file   Food Insecurity: Not on file   Transportation Needs: Not on file   Physical Activity: Not on file   Stress: Not on file   Social Connections: Not on file   Intimate Partner Violence: Not on file   Housing Stability: Not on file            Current Facility-Administered Medications   Medication Dose Route Frequency Provider Last Rate Last Admin    metoprolol (LOPRESSOR) injection 5 mg  5 mg IntraVENous Q6H Pa Qureshi MD   5 mg at 07/22/22 1341    [Held by provider] carvedilol (COREG) tablet 6.25 mg  6.25 mg Oral BID Urban Ahumada, MD        citalopram (CELEXA) tablet 20 mg  20 mg Oral Daily Urban Ahumada, MD   20 mg at 07/22/22 1402    [Held by provider] digoxin (LANOXIN) tablet 125 mcg  125 mcg Oral Daily Urban Ahumada, MD        ferrous sulfate (IRON 325) tablet 325 mg  325 mg Oral Every Other Day Urban Ahumada, MD   325 mg at 07/19/22 1507    levothyroxine (SYNTHROID) tablet 88 mcg  88 mcg Oral Daily Urban Ahumada, MD   88 mcg at 07/22/22 1272    rivaroxaban (XARELTO) tablet 15 mg  15 mg Oral Daily ADRIENNE Brown CNP        [Held by provider] amLODIPine (NORVASC) tablet 5 mg  5 mg Oral Daily ADRIENNE Brown CNP        morphine sulfate (PF) injection 4 mg  4 mg IntraVENous Once Hiro Moreland PA-C        ondansetron [facility]) injection 4 mg  4 mg IntraVENous Once Hiro Moreland PA-C        sodium chloride flush 0.9 % injection 5-40 mL  5-40 mL IntraVENous 2 times per day ADRIENNE Zhao CNP   10 mL at 07/22/22 1019    sodium chloride flush 0.9 % injection 5-40 mL  5-40 mL IntraVENous PRN ADRIENNE Brown CNP        0.9 % sodium chloride infusion   IntraVENous PRN ADRIENNE Zhao CNP        ondansetron (ZOFRAN-ODT) disintegrating tablet 4 mg  4 mg Oral Q8H PRN ADRIENNE Brown CNP        Or    ondansetron (ZOFRAN) injection 4 mg  4 mg IntraVENous Q6H PRN ADRIENNE Zhao CNP        polyethylene glycol (GLYCOLAX) packet 17 g  17 g Oral Daily PRN Adolm Savin, APRN - CNP        acetaminophen (TYLENOL) tablet 650 mg  650 mg Oral Q6H PRN Adolm Savin, APRN - CNP        Or    acetaminophen (TYLENOL) suppository 650 mg  650 mg Rectal Q6H PRN Adolm Savin, APRN - CNP        piperacillin-tazobactam (ZOSYN) 3,375 mg in dextrose 5 % 50 mL IVPB extended infusion (mini-bag)  3,375 mg IntraVENous Q8H Kala Jv, APRN - CNP 12.5 mL/hr at 07/22/22 1341 3,375 mg at 07/22/22 1341    ketorolac (TORADOL) injection 15 mg  15 mg IntraVENous Q6H PRN Adolm Savin, APRN - CNP   15 mg at 07/22/22 1514     No outpatient medications have been marked as taking for the 7/18/22 encounter [facility] Encounter). Allergies   Allergen Reactions    Latex     Tape [Adhesive Tape]          ROS:  Unremarkable except for symptoms mentioned in HPI. PHYSICAL EXAMINATION:   VITAL SIGNS: /82   Pulse 77   Temp 97.7 °F (36.5 °C) (Oral)   Resp 16   Ht 5' 6\" (1.676 m)   Wt 164 lb 6.4 oz (74.6 kg)   SpO2 98%   BMI 26.53 kg/m²       GENERAL: In no acute distress, well- nourished, well- developed, alert and oriented to person place and time. SKIN: Warm and dry, without jaundice, ecchymoses, or petechiae. HEENT: Normocephalic, sclera anicteric, oral mucosa moist without lesion or exudate in the visible oral cavity or oropharynx, no epistaxis  NECK: supple , no JVD; no thyromegaly  NODES: No palpable adenopathy in the neck Levels I-V, bilateral supraclavicular fossae, axillary chains, or inguinal regions. LUNGS: Good inspiratory effort, no accessory muscle use, clear bilaterally, no focal wheeze, rales or rhonchi. CARDIAC: Normal HS; Regular rate and rhythm,   ABDOMINAL: Normal bowel sounds present, soft, + mild tenderness over epigastric area, no mass or organomegaly.   MUSKL: no tenderness over spine or ribs  EXTREMITIES:no pedal edema or calf tenderness  NEUROLOGIC: Gait not tested No grossly apparent focal deficits.     LAB RESULTS:  Recent Results (from the past 24 hour(s))   Comprehensive Metabolic Panel w/ Reflex to MG    Collection Time: 07/22/22  7:50 AM   Result Value Ref Range    Sodium 137 135 - 144 mEq/L    Potassium reflex Magnesium 3.3 (L) 3.4 - 4.9 mEq/L    Chloride 101 95 - 107 mEq/L    CO2 23 20 - 31 mEq/L    Anion Gap 13 9 - 15 mEq/L    Glucose 102 (H) 70 - 99 mg/dL    BUN 7 (L) 8 - 23 mg/dL    Creatinine 0.30 (L) 0.50 - 0.90 mg/dL    GFR Non-African American >60.0 >60    GFR  >60.0 >60    Calcium 7.9 (L) 8.5 - 9.9 mg/dL    Total Protein 6.0 (L) 6.3 - 8.0 g/dL    Albumin 2.4 (L) 3.5 - 4.6 g/dL    Total Bilirubin 0.5 0.2 - 0.7 mg/dL    Alkaline Phosphatase 55 40 - 130 U/L    ALT 16 0 - 33 U/L    AST 21 0 - 35 U/L    Globulin 3.6 (H) 2.3 - 3.5 g/dL   CBC with Auto Differential    Collection Time: 07/22/22  7:50 AM   Result Value Ref Range    WBC 11.1 (H) 4.8 - 10.8 K/uL    RBC 3.85 (L) 4.20 - 5.40 M/uL    Hemoglobin 10.3 (L) 12.0 - 16.0 g/dL    Hematocrit 33.1 (L) 37.0 - 47.0 %    MCV 85.8 82.0 - 100.0 fL    MCH 26.8 (L) 27.0 - 31.3 pg    MCHC 31.2 (L) 33.0 - 37.0 %    RDW 14.4 11.5 - 14.5 %    Platelets 316 698 - 499 K/uL    Neutrophils % 84.5 %    Lymphocytes % 5.5 %    Monocytes % 6.7 %    Eosinophils % 2.9 %    Basophils % 0.4 %    Neutrophils Absolute 9.4 (H) 1.4 - 6.5 K/uL    Lymphocytes Absolute 0.6 (L) 1.0 - 4.8 K/uL    Monocytes Absolute 0.7 0.2 - 0.8 K/uL    Eosinophils Absolute 0.3 0.0 - 0.7 K/uL    Basophils Absolute 0.0 0.0 - 0.2 K/uL   Magnesium    Collection Time: 07/22/22  7:50 AM   Result Value Ref Range    Magnesium 1.7 1.7 - 2.4 mg/dL   COVID-19, Rapid    Collection Time: 07/22/22  3:45 PM    Specimen: Nasopharyngeal Swab   Result Value Ref Range    SARS-CoV-2, NAAT Not Detected Not Detected     Recent Labs     07/22/22  0750   GLUCOSE 102*        RADIOLOGY RESULTS:  CT ABDOMEN PELVIS WO CONTRAST Additional Contrast? Oral    Result Date: 7/18/2022  CT of the Abdomen and Pelvis without intravenous contrast medium History:  Approximate 1 week history of abdominal pain Technical Factors: CT imaging of the abdomen and pelvis were obtained and formatted as 5 mm contiguous axial images from the domes of the diaphragm to the symphysis pubis. Sagittal and coronal reconstructions were also obtained. Oral contrast medium: Barium sulfate, 450 mL. Intravenous contrast medium:  None. Comparison:  CT abdomen pelvis, July 18, 2022, 1444 hours, February 8, 2021. Findings: Residual intravenous contrast medium from recent CT examination identified. Lungs:  Lung bases clear. Cardiac size enlarged, unchanged. Calcification at level of mitral valve. Small hiatal hernia. Liver:  Normal in size, shape, and attenuation. Bile Ducts:  Normal in caliber. Gallbladder:  No stones or wall thickening. Pancreas:  Normal without masses, cysts, ductal dilatation or calcification. Spleen:  Normal in size without masses or calcifications. No splenules. Kidneys:  Normal in size. No hydronephrosis, masses, or stones. Adrenals:  Normal. Small bowel:  Normal in caliber. See \"peritoneum \". Appendix:  Not visualized. Colon:  Normal in caliber. See \"peritoneum \". Peritoneum:  No free air. A bilobed, 10.0 x 8.0 x 10.6 cm mass having central rounded septated areas of low attenuation, 3.7 x 3.5 x 5.4 cm, and displacing cecum and ascending colon anteriorly (series 2, image 50, series 601, image 54, series 602, image 36). No calcification identified. Trace adjacent fat stranding demonstrated. Vessels: Aorta normal in course and caliber. Lymph nodes:  Retroperitoneal:  No enlarged retroperitoneal lymph nodes. Mesenteric:  No enlarged mesenteric lymph nodes. Pelvic: No enlarged pelvic lymph nodes. Ureters: Normal in course and caliber. No calcifications. Bladder: No wall thickening.  Reproductive organs: 8.0 x 5.8 x 7.5 cm left adnexal cyst displacing urinary bladder right and laterally, anteriorly, and inferiorly (series 2, image 68, series 601, image 42, series 602, image 52). Abdominal Wall: Fat identified bilateral inguinal canals. Bones:  No bone lesions. Multilevel disc space narrowing lumbar spine. No post operative changes. Complex 10.0 x 8.0 x 10.6 cm mass with septated central low attenuation fluid collections as discussed. Mass displaces cecum and ascending colon anteriorly. Differential includes malignancy including appendiceal mucocele/carcinoma, as well as retroperitoneal sarcoma with central necrotic change. Infection/abscess secondary consideration, minimal presence of adjacent inflammatory change. 8.0 x 5.8 x 7.5 cm left adnexal cysts as discussed, most likely ovarian in etiology. If clinical concern warrants, pelvic sonography may be obtained for further evaluation. Other findings discussed. All CT scans at this facility use dose modulation, iterative reconstruction, and/or weight based dosing when appropriate to reduce radiation dose to as low as reasonably achievable. CT Head WO Contrast    Result Date: 7/18/2022  CT OF THE BRAIN WITHOUT CONTRAST HISTORY: Kayla Johnson is a Female of 80 years age with history of headache and dizziness. COMPARISON: July 19, 2017 TECHNIQUE:  Spiral CT examination of the brain was performed without intravenous contrast.  Images were obtained from the base of the skull up to the convexities in axial slices, and then examined in the blood, brain parenchymal and bony windows. FINDINGS:  No acute changes are noted. There is no evidence for mass, mass effect or midline shift. No abnormal extra-axial fluid collections are appreciated. Age-appropriate cortical volume loss is seen. There are patchy areas of hypoattenuation in a sub-cortical, central white and periventricular distribution consistent with nonspecific ischemic small vessel disease. Calcifications are again seen in the basal ganglia.  There are no acute parenchymal alterations, blood age. DIAGNOSIS:   Right abdominal mass with possible fluid collection vs necrotic tissue COMPARISON: None available. CT Dose-Length Product (estimate related to radiation exposure from this exam):  541.6  mGy*cm. PROCEDURE: Following the discussion of the procedure, alternatives, risks versus benefits, informed consent was obtained from the patient. Specifically, risks of after-biopsy pain at the site, rare possibility of excessive hemorrhage, infection, injury to the adjacent organs were discussed and the patient verbalized understanding. Pre-procedure evaluation confirmed that the patient was an appropriate candidate for conscious sedation. Adequate sedation was maintained during the entire procedure. Vital signs, pulse oximetry, and response to verbal commands were monitored and recorded by the nurse throughout the procedure and the recovery period. Medical information was entered in the medical record including the medications and dosages used. The patient returned to baseline neurologic and physiologic status prior to leaving the department. No immediate sedation related complications were noted. Medication for conscious sedation was administered via IV route. 45 minutes of conscious sedation was provided. Following universal protocol, patient and site verification was performed with a \"timeout\" prior to the procedure. The patient was placed on the CT table in supine  position and the right lateral abdomen area was prepped and draped in usual sterile fashion. Using the usual sterile conditions, lidocaine and CT guidance, the fluid collection within soft tissue density  was accessed using a Yueh needle. After confirmation of appropriate localization of the needle, aspiration yielded a thick red to yellow fluid which was sent for microbiology and cytology analysis. Following that an Amplatz wire was placed through the Yueh sheath into the abscess under CT guidance.  The tract was serially dilated with 6, 8, and 10 [place] dilators respectively. Following that a 10 [place] drainage catheter was advanced over the wire into the abscess under CT guidance and the anchoring loop was formed. Catheter was sutured to the skin and and secured with Percufix device. The patient tolerated the procedure well. No immediate complications identified. The patient left the CT suite in supine position to the recovery room in stable condition. Total local anesthetic used: lidocaine, approximately 15 mL. Estimated blood loss:  None. CT ABDOMEN PELVIS W IV CONTRAST Additional Contrast? None    Result Date: 7/18/2022  Comparison: February 8, 2021 History: Right lower quadrant abdominal pain  Technique: CT ABDOMEN PELVIS W IV CONTRAST Helically Acquired CT of the  abdomen and pelvis was performed during the intravenous infusion of 100 mL of Isovue-370. Axial delayed images were also performed. Reformatted sagittal and coronal images were also  obtained. Findings: The visualized bases of the lungs shows no consolidating pneumonia or pleural effusion. There is atelectasis seen in the bases. The thoracic aorta and visualized portion is tortuous and mildly dilated at 4 cm in greatest transverse diameter which is stable. . In the right lower quadrant of the abdomen there are multiple bowel loops seen that are peripheral to hypodense collections which could be from fluid or proteinaceous collections such as abscess. This the loops of bowel appear to be a ascending colon and  ileum. The largest walled off collections measure 4.5 x 3.8 x 3.3 cm and 4.4 x 3.4 x 4.2 cm. There is pericolic stranding seen. The left colon is unremarkable. A cystic structure is again seen in the pelvis that measures 6.7 x 8.6 x 9.8 cm. A small amount of free fluid is seen in the posterior pelvis. Bilateral inguinal hernias are seen. The hernia on the left contains fat. The hernia on the right contains a loop of small bowel. No bowel obstruction is seen.  The liver is decreased in attenuation and is enlarged. The spleen, pancreas and gallbladder and adrenal glands are unremarkable. Bilaterally both kidneys show uptake of contrast with no evidence for hydronephrosis or renal calculi. Prominent atherosclerotic calcifications are seen. The bladder is partially decompressed. The bladder wall however also appears to be thickened. Degenerative changes are seen in the spine at multiple levels. Intervertebral disc space narrowing is seen at L1 to. Slight anterolisthesis of L4 on L5 is noted. There is vacuum anomaly seen at all levels in the lumbar spine. Impression: 1. In the right lower quadrant of the abdomen, pericolic abscesses versus pericolic mass is seen. This is thought more likely abscess. 2. A cystic mass is again seen in the pelvis and is likely in the ovary. It is unchanged from previous examination The patient will be given oral contrast and rescanned to evaluate the right lower quadrant. All CT scans at this facility use dose modulation, iterative reconstruction, and/or weight based dosing     ASSESSMENT AND PLAN  Right abd mass and pain r/o pericolic abscess , r/o malignancy. Continue antibiotic tx. Check cytology done on drainage from pericolic mass. --will also check surgical recommendations. 2. Epigastric pain--maybe due to gastritis/PUD. --pt will be given IV Protonix. Thank you, Dr. Diane Mortensen , fpr this consultation.     Electronically signed by Cara Gil MD on 7/22/2022 at 4:30 PM

## 2022-07-22 NOTE — PROGRESS NOTES
Resumed care of pt from [facility]. Pt resting quietly in bed. Just changed her brief after she voided. Pt very thankful. Vitals stable. Did not give Lopressor due to HR being under 100 BPM. She has no other requests at this time. Will continue to monitor. Fall precautions in place.      Electronically signed by Marko Olmstead RN on 7/22/2022 at 1:27 AM

## 2022-07-22 NOTE — CARE COORDINATION
[facility] Pre-Admission Screening Document      Patient Name: Ag Ricci       MRN: [id number]    : 1932    Age: 80 y.o. Gender: female   Payor: Payor: MEDICARE / Plan: MEDICARE PART A AND B / Product Type: *No Product type* /   MSSP: No    Admitted from: [facility] Floor: 4W  Attending Care Provider: Harman Aiken MD  Inpatient Rehab Referring Care Provider: Harman Aiken MD  Primary Care Provider: Debi Lamb MD  Inpatient Treatment Team including Consults: Treatment Team: Attending Provider: Harman Aiken MD; Registered Nurse: Clement Dickens RN; Consulting Physician: Elizabeth Najera MD; Consulting Physician: Keisha Miller MD; Consulting Physician: Ida Homans, MD; Consulting Physician: Darya Alan MD; Consulting Physician: Benny Bhandari MD; Registered Nurse: Mary Galarza RN; Utilization Reviewer: Sue Garcia RN; : Ericka Hunter RN; Registered Nurse: Dee Ornelas RN; Utilization Reviewer: Sarina Rivero RN; Consulting Physician: Michael Riggins DO; : Rebeka Pisano RN; : Kenney Shelby RN    Reason for Hospitalization:   1. Abdominal mass, unspecified abdominal location    2. Right sided abdominal pain      Chief Complaint   Patient presents with    Abdominal Pain     Isolation:No active isolations    Hospital Course:  Admit Date: 2022 12:14 PM  Inpatient Rehab Referral Date: 22  Narrative of hospital course/history of present illness: 80 yr old female presented to ED with c/o abdominal pain which patient states been ongoing for approximately the last 5 days to 1 week. CT Abd revealed 10 cm mass with septated central low attenuation fluid collection, malignancy versus abscess. OR 22 with Dr Malon Alpers: IR Drainage and drain placement in fluid collection. Right colonic mass with fluid collection internally was drained and 10 Danish drain placed.    ID consulted      Medical & Surgical History/Current Comorbidities:  Past Medical History:   Diagnosis Date    Ascending aortic aneurysm ([facility] 6/23/2017    Charcot Arleen Tooth muscular atrophy     CHF (congestive heart failure) (HCC)     Chronic diastolic CHF (congestive heart failure) ([facility] 4/1/2022    Depression     Descending aortic aneurysm ([facility] [address] 6/23/2017    Essential hypertension 2/16/2018    Headache     HTN (hypertension)     Impaired mobility and activities of daily living     Lumbar stenosis with neurogenic claudication     Myelopathy ([facility] [address]     Osteoarthritis      Past Surgical History:   Procedure Laterality Date    JOINT REPLACEMENT Bilateral     knees    OTHER SURGICAL HISTORY Right 07/21/2022    cat scan guided abdominal mass aspiration with drain placement by Dr. Douglass Speaker Left 02/25/2021    LEFT PLEURAL CATHETER INSERTION performed by Letha Garza MD at [place] [address] Left 01/29/2021    total of 755 cc removed per Dr Neelam Ho specimen sent to lab       Advanced Directives:  Advance Directives       Power of  Living Will ACP-Advance Directive ACP-Power of     Not on File Not on File Not on File Not on File            Labs/Infection Control:  Recent Labs     07/20/22  0119 07/20/22  0628 07/21/22  0620 07/22/22  0750   WBC  --  15.3* 11.8* 11.1*   HGB  --  9.4* 10.0* 10.3*   HCT  --  28.4* 30.2* 33.1*   PLT  --  328 335 344   BUN 15 14 11 7*   CREATININE 0.63 0.57 0.45* 0.30*   GLUCOSE 114* 128* 107* 102*    139 139 137   K 2.9* 3.9 3.5 3.3*   CALCIUM 8.5 8.6 8.5 7.9*   PROT  --  5.9* 5.8* 6.0*      Blood cultures:  No results for input(s): BC in the last 72 hours. Urinalysis/C&S:  No results for input(s): NITRITE, LABCAST, WBCUA, RBCUA, MUCUS, TRICHOMONAS, YEAST, BACTERIA, LEUKOCYTESUR, BLOODU, GLUCOSEU, KETUA, AMORPHOUS, LABURIN in the last 72 hours.     Radiology:  CT ABDOMEN PELVIS WO CONTRAST   Result Date: 7/18/2022  Complex 10.0 x 8.0 x 10.6 cm mass with septated central low attenuation fluid collections as discussed. Mass displaces cecum and ascending colon anteriorly. Differential includes malignancy including appendiceal mucocele/carcinoma, as well as retroperitoneal sarcoma with central necrotic change. Infection/abscess secondary consideration, minimal presence of adjacent inflammatory change. 8.0 x 5.8 x 7.5 cm left adnexal cysts as discussed, most likely ovarian in etiology. If clinical concern warrants, pelvic sonography may be obtained for further evaluation. Other findings discussed. All CT scans at this facility use dose modulation, iterative reconstruction, and/or weight based dosing when appropriate to reduce radiation dose to as low as reasonably achievable. CT Head WO Contrast  Result Date: 7/18/2022  1. Age-appropriate cortical atrophy and periventricular microangiopathy. When compared to previous study there has been no significant interval change. 2. No acute hemorrhage or extra-axial fluid collection All CT scans at this facility use dose modulation, iterative reconstruction, and/or weight based dosing when appropriate to reduce radiation dose to as low as reasonably achievable. CT ABSCESS DRAINAGE W CATH PLACEMENT S&I  7/21/2022  1. Successful drainage and drain placement of right colonic soft tissue mass with central fluid collection. 2.Fluid was aspirated and sent for microbiology and cytology analysis. Additionally a 10 Indonesian drain was placed yielding a thick red to yellow fluid with internal yellow debris. CT ABDOMEN PELVIS W IV CONTRAST   Result Date: 7/18/2022  Impression: 1. In the right lower quadrant of the abdomen, pericolic abscesses versus pericolic mass is seen. This is thought more likely abscess. 2. A cystic mass is again seen in the pelvis and is likely in the ovary. It is unchanged from previous examination The patient will be given oral contrast and rescanned to evaluate the right lower quadrant.   All CT scans at this facility use dose modulation, iterative reconstruction, and/or weight based dosing         Medications/IV's:  The patient is currently on xarelto for DVT prophylaxis. +ON HOLD+    Scheduled:    metoprolol, 5 mg, IntraVENous, Q6H    [Held by provider] carvedilol, 6.25 mg, Oral, BID    citalopram, 20 mg, Oral, Daily    [Held by provider] digoxin, 125 mcg, Oral, Daily    ferrous sulfate, 325 mg, Oral, Every Other Day    levothyroxine, 88 mcg, Oral, Daily    [Held by provider] rivaroxaban, 15 mg, Oral, Daily    [Held by provider] amLODIPine, 5 mg, Oral, Daily    morphine, 4 mg, IntraVENous, Once    ondansetron, 4 mg, IntraVENous, Once    sodium chloride flush, 5-40 mL, IntraVENous, 2 times per day    piperacillin-tazobactam, 3,375 mg, IntraVENous, Q8H    PRN:  sodium chloride flush, sodium chloride, ondansetron **OR** ondansetron, polyethylene glycol, acetaminophen **OR** acetaminophen, ketorolac    Allergies: Allergies   Allergen Reactions    Latex     Tape Zoie Beckford Tape]          Most Recent Vitals, Height and Weight  /82   Pulse 77   Temp 97.7 °F (36.5 °C) (Oral)   Resp 16   Ht 5' 6\" (1.676 m)   Wt 164 lb 6.4 oz (74.6 kg)   SpO2 98%   BMI 26.53 kg/m²     Weight Bearing Restrictions: wbat       Current Diet Order: ADULT DIET;  Regular  ADULT ORAL NUTRITION SUPPLEMENT; PM Snack, Lunch, Dinner; Standard High Calorie/High Protein Oral Supplement    Skin: RUQ surgical incision, 10French drain     Lungs: wdl       Cognition and Behavior:  Language Preference (if other than English):      Alertness/Behavior  Neuro (WDL): Within Defined Limits  Level of Consciousness: Alert (0)  History of Falling: No      Short Term Memory Deficits     History of Falling: No    Safety          Prior Level of Function and Living Arrangements:  Social/Functional History  Lives With: Alone  Type of Home: House  Home Layout: One level  Home Access: Stairs to enter with rails  Entrance Stairs - Number of Steps: 2  Entrance Stairs - Rails: Both  Bathroom Shower/Tub: Tub/Shower unit  Bathroom Equipment: Shower chair, Grab bars in shower  Home Equipment: [employer]  ADL Assistance: [address]: Independent  Homemaking Responsibilities: Yes  Meal Prep Responsibility: Primary  Laundry Responsibility: Primary  Cleaning Responsibility: Primary  Shopping Responsibility: No (Son performs)  Ambulation Assistance: Independent (furniture walking and cane prn)  Transfer Assistance: Independent  Active : No  Mode of Transportation: Family  Additional Comments: Social-functional information provided by son  Dental Appliances: None  Vision - Corrective Lenses: None  Hearing Aid: None  Personal Equipment:   Dental Appliances: None  Vision - Corrective Lenses: None  Hearing Aid: None      CURRENT FUNCTIONAL LEVEL:  Physical Therapy  Bed mobility:  Bed mobility  Rolling to Left: Contact guard assistance (07/22/22 0935)  Rolling to Right: Contact guard assistance (07/22/22 0935)  Supine to Sit: Minimal assistance (07/22/22 0935)  Sit to Supine: Moderate assistance (07/22/22 0935)  Bed Mobility Comments: several rolling trials for hygiene d/t bowel/bladder incontinence, use of bed rails and HOB flat. Encouraged log roll with tactile cues.  Increased time and effort, dizziness reported upon initial sitting posture which resolves quickly (07/22/22 0935)  Transfers:  Transfers  Sit to Stand: Minimal Assistance (07/22/22 5873)  Stand to sit: Minimal Assistance (07/22/22 6332)  Comment: decreased safety with hand placement despite VC and TC (07/22/22 1342)  Gait:   Ambulation  Surface: level tile (07/22/22 0937)  Device: Standard Walker (limited by equipment in the room) (07/22/22 3774)  Other Apparatus: O2 (3L) (07/22/22 0937)  Assistance: Minimal assistance (07/22/22 3285)  Gait Deviations: Slow [place]a;Decreased step length;Decreased step height;Decreased head and trunk rotation (07/22/22 0937)  Distance: 3ft x 2 (forward and retro, sidestepping near EOB) (07/22/22 0591)  Comments: assist with balance reactions and walker management, appears SOB (07/22/22 0937)  Stairs:     W/C mobility:         Occupational Therapy  Hand Dominance: Right  ADL  Feeding: Setup (07/22/22 0925)  Grooming: Minimal assistance (07/22/22 0925)  UE Bathing: Setup (07/22/22 0925)  LE Bathing: Maximum assistance (07/22/22 0925)  UE Dressing: Minimal assistance (07/22/22 0925)  LE Dressing: Maximum assistance (07/22/22 2398)  Toileting: Dependent/Total (Therapist assist to perform hygiene and change brief) (07/22/22 0925)  Additional Comments:  All ADLs simulated as above (07/22/22 0925)  Toilet Transfers  Toilet Transfers Comments: Not formally assessed, anticipate Min A based on functional observation (07/22/22 0927)  Tub Transfers  Tub Transfers: Not tested (07/22/22 5573)  Shower Transfers  Shower Transfers: Not tested (07/22/22 0927)      Speech Language Pathology n/a      Current Conditions Requiring Inpatient Rehabilitation  Bowel/Bladder Dysfunction: Yes  Intervention Required = Frequent toileting, Briefs, and incontinence care  Risk for Medical/Clinical Complications = high  Skin Healing/Breakdown Risk: Yes  Intervention Required = Side to side turns, Surgical incision, and Monitor for S/S of infection  Risk for Medical/Clinical Complications = high  Nutrition/Hydration Deficiency: Yes  Intervention Required = Monitor I&Os and Check Labs  Risk for Medical/Clinical Complications = high  Medical Comorbidities: Yes  Intervention Required = DVT risk and CHF, AFib  Risk for Medical/Clinical Complications = high    Rehab/Skilled Needs:   3 hours of Intensive Acute Rehab therapy daily, 5 days/week for a total of 900 minutes  PT Treatment Time:  1.5 hrs/day  OT Treatment Time: 1.5 hrs/day  Rehabilitation Nursing   Case management/Social work  [address]  Oxygen/CPAP/BiPAP  PICC/IV Medications    Cultural needs:     None  Funding needs:     none    Expected Level of Improvement with Rehab  Assist for ADL Modified [place]  Assist for Transfers Modified [place]  Assist for Gait Modified [place]    Patient's willingness to participate: Yes  Patient's ability to tolerate proposed care: Yes  Patient/Family Goals of Rehab (in patient's/family's own words): get stronger, return home independent      Anticipated Discharge Plan:  Home with   Home Health, RN PT OT Aide Social Work to be determined      Barriers to Discharge:  Home entry accessibility  Equipment needs  Caregiver availability  [address] transportation  Resource availability      Rehab evaluation plan: Recommend Acute Rehab   Rehabilitation Impairment Group Code: 3.3  Rehab Impairment Group: Neurologic: Neuropathy  Estimated Length of Stay (days): 12  Rehab Diagnosis: Impaired mobility and ADL's due to Charcot Arleen Tooth Neuropathy  Reviewer's Signature: Electronically signed by Gama Ma RN on 7/22/22 at 12:54 PM EDT      I have reviewed and concur with the above Preadmission Screening.    Rehab Admitting Doctor: Dr. Phillip Hernadez, DO

## 2022-07-22 NOTE — CARE COORDINATION
Inpatient Rehab referral received. Met with patient, daughter at bedside, explained [facility] Acute Inpatient Rehab program and requirements, including 3 hours of intense therapy daily, anticipated length of stay and goal of discharge to home. All questions answered. Freedom of choice provided and patient and daughter requesting admit to [facility] as patient was on ARU last year and was able to DC home with [facility]. [facility] [employer] and contact info provided. Per daughter, patient still lives alone in 1 story home, few steps to enter and was independent prior to admit. Patient uses a cane and/or furniture as needed to get around inside the house, only uses walker and leg braces for longer distances outside. Daughter lives in [place], but son is local and assists as needed. PM&R consult pending as well as Oncology and ID consults. Will continue to follow. Electronically signed by Britton Starkey RN on 7/22/22 at 11:47 AM EDT    Case reviewed with PM&R Dr Petra Zimmerman, patient OK to transfer to ARU today Room 252 and have ID and Oncology consults see them on the unit. Mirta PIÑA notified.  Electronically signed by Britton Starkey RN on 7/22/22 at 1:05 PM EDT

## 2022-07-22 NOTE — CARE COORDINATION
SPOKE W/DTR SARAI TO INFORM OF PT'S ACCEPTANCE AND ROOM ASSIGNMENT TO [facility] ACUTE REHAB,  TODAY.

## 2022-07-22 NOTE — PLAN OF CARE
See OT evaluation for all goals and OT POC.  Electronically signed by SERAFIN Matthews on 7/22/2022 at 9:36 AM

## 2022-07-22 NOTE — CONSULTS
Physical Medicine & Rehabilitation  Consult Note      Admitting Physician: Harman Aiken MD    Primary Care Provider: Debi Lamb MD     Reason for Consult:  Asses rehab needs, promote physical and mental function, analyze level of care to determine rehab needs, improve ability to actively participate in the rehabilitation process, and decrease likelihood of re-admit to the hospital after discharge. History of Present Illness:    Ag Ricci is a 80 y.o. female admitted to [facility] on 7/18/2022. Patient was initially admitted through the ER at [facility] complaining of abdominal pain for the past 5 to 7 days. She also complained of headache and dizziness. Imaging revealed an abdominal mass felt to be either cancer or an abscess. Imaging was thinking it was more like an abscess. She was admitted to [facility] placed on antibiotics and a drain was placed under interventional radiology's guidance. She is currently on Zosyn. Lactic acid level was 1.2. Potassium level was initially very low on 720 at 2.9. She has been getting sliding scale potassium and is now 3.3. White blood cell count has come down to 11,000. Fatigue  This is a recurrent problem. The current episode started in the past 7 days. Associated symptoms include arthralgias, congestion, fatigue, myalgias and weakness. Pertinent negatives include no abdominal pain, chest pain, chills, coughing, diaphoresis, fever, headaches, nausea, neck pain, rash, sore throat or vomiting. The symptoms are aggravated by walking. She has tried rest for the symptoms. The treatment provided mild relief. I reviewed recent nursing notes discussed care with acute care providers, \" Pt resting quietly in bed. Just changed her brief after she voided. Pt very thankful. Vitals stable. Did not give Lopressor due to HR being under 100 BPM. She has no other requests at this time. Will continue to monitor.  Fall precautions in place. \".   Events from the previous 24 hours reviewed . Their inpatient work up has included:    Imaging:  Imaging and other studies reviewed and discussed with patient and staff    ECHO     6/22/2022  Transthoracic Echocardiography   Left Ventricle Normal left ventricle structure and function. Left ventricular ejection fraction is visually estimated at 60%. E/A flow reversal noted. Suggestive of diastolic dysfunction. Right Ventricle Normal right ventricle structure and function. Normal right ventricle systolic pressure. Left Atrium Mildly dilated left atrium. Right Atrium Normal right atrium. Mitral Valve Mitral annular calcification is present. 1+ MR Tricuspid Valve Normal tricuspid valve structure and function. 2+ TR RVSP 47 mmHg Aortic Valve Aortic valve leaflets are mildly thickened. Pulmonic Valve The pulmonic valve was not well visualized . Pericardial Effusion No evidence of pericardial effusion. Pleural Effusion No evidence of pleural effusion. Aorta \ Miscellaneous The aorta is within normal limits. M-Mode Measurements (cm)   LVIDd: 4.46 cm                         LVIDs: 2.97 cm  IVSd: 1.36 cm                          IVSs: 1.9 cm  LVPWd: 0.79 cm                         LVPWs: 1.54 cm  Rt. Vent.  Dimension: 4.28 cm           AO Root Dimension: 4.91 cm                                         ACS: 1.27 cm                                         LVOT: 2.12 cm  Doppler Measurements:   AV Velocity:0.03 m/s                   MV Peak E-Wave: 0.91 m/s  AV Peak Gradient: 9.97 mmHg            MV Peak A-Wave: 0.98 m/s  AV Mean Gradient: 4.69 mmHg  AV Area (Continuity):2.6 cm^2          MV P1/2t: 110.9 msec  TR Velocity:3.07 m/s                   MVA by PHT1.98 cm^2  TR Gradient:37.74 mmHg                 Estimated RAP:10 mmHg                                         RVSP:47.74 mmHg  Valves  Mitral Valve   Peak E-Wave: 0.91 m/s                 Peak A-Wave: 0.98 m/s  P1/2t: 110.9 msec E/A Ratio: 0.93  Mean Velocity: 0.63 m/s               Peak Gradient: 3.31 mmHg  Mean Gradient: 1.89 mmHg              Deceleration Time: 171.8 msec  Area (PHT): 1.98 cm^2                 Area (continuity): 2.17 cm^2  MR Velocity: 4.75 m/s   Tissue Doppler   E' Septal Velocity: 0.04 m/s  E' Lateral Velocity: 0.06 m/s   Aortic Valve   Peak Velocity: 1.58 m/s               Mean Velocity: 0.99 m/s  Peak Gradient: 9.97 mmHg              Mean Gradient: 4.69 mmHg  Area (continuity): 2.6 cm^2  AV VTI: 35.79 cm   Cusp Separation: 1.27 cm   Tricuspid Valve   Estimated RVSP: 47.74 mmHg              Estimated RAP: 10 mmHg  TR Velocity: 3.07 m/s                   TR Gradient: 37.74 mmHg   Pulmonic Valve   Peak Velocity: 0.85 m/s           Peak Gradient: 2.9 mmHg                                    Estimated PASP: 47.74 mmHg   LVOT   Peak Velocity: 0.99 m/s              Mean Velocity: 0.71 m/s  Peak Gradient: 3.93 mmHg             Mean Gradient: 2.17 mmHg  LVOT Diameter: 2.12 cm               LVOT VTI: 26.34 cm  Structures  Left Atrium   LA Volume/Index: 54.91 ml /32 m^2             LA Area: 18.45 cm^2   Left Ventricle   Diastolic Dimension: 4.08 cm         Systolic Dimension: 6.19 cm  Septum Diastolic: 4.85 cm            Septum Systolic: 1.9 cm  PW Diastolic: 1.81 cm                PW Systolic: 8.12 cm                                       FS: 33.4 %  LV EDV/LV EDV Index: 90.64 ml/53 m^2 LV ESV/LV ESV Index: 34.16 ml/20 m^2  EF Calculated: 62.3 %                LV Length: 6.91 cm   LVOT Diameter: 2.12 cm   Right Atrium   RA Systolic Pressure: 10 mmHg   Right Ventricle   Diastolic Dimension: 4.05 cm                                    RV Systolic Pressure: 36.78 mmHg  Aorta/ Miscellaneous Aorta   Aortic Root: 4.91 cm  LVOT Diameter: 2.12 cm          CT ABDOMEN PELVIS  7/18/2022    Residual intravenous contrast medium from recent CT examination identified. Lungs:  Lung bases clear. Cardiac size enlarged, unchanged. adnexal cysts as discussed, most likely ovarian in etiology. If clinical concern warrants, pelvic sonography may be obtained for further evaluation. Other findings discussed. CT Head  7/18/2022     No acute changes are noted. There is no evidence for mass, mass effect or midline shift. No abnormal extra-axial fluid collections are appreciated. Age-appropriate cortical volume loss is seen. There are patchy areas of hypoattenuation in a sub-cortical, central white and periventricular distribution consistent with nonspecific ischemic small vessel disease. Calcifications are again seen in the basal ganglia. There are no acute parenchymal alterations, blood byproducts or abnormal extracerebral fluid. The calvarium is grossly intact. The visualized paranasal sinuses show mucoceles or polyps in the maxillary sinuses. Mild fluid is seen in the mastoid air cells that is not significantly changed and is consistent with chronic changes. Vearl Pippins 1.Age-appropriate cortical atrophy and periventricular microangiopathy. When compared to previous study there has been no significant interval change. 2. No acute hemorrhage or extra-axial fluid collection          CT ABSCESS DRAINAGE W CATH PLACEMENT S&I    1. Successful drainage and drain placement of right colonic soft tissue mass with central fluid collection. 2.Fluid was aspirated and sent for microbiology and cytology analysis. Additionally a 10 [place] drain was placed yielding a thick red to yellow fluid with internal yellow debris. HISTORY: Darshan Pfeiffer is a Female of 80 years age. DIAGNOSIS:   Right abdominal mass with possible fluid collection vs necrotic tissue COMPARISON: None available. CT Dose-Length Product (estimate related to radiation exposure from this exam):  541.6  mGy*cm. PROCEDURE: Following the discussion of the procedure, alternatives, risks versus benefits, informed consent was obtained from the patient.   Specifically, risks of after-biopsy pain at the site, rare possibility of excessive hemorrhage, infection, injury to the adjacent organs were discussed and the patient verbalized understanding. Pre-procedure evaluation confirmed that the patient was an appropriate candidate for conscious sedation. Adequate sedation was maintained during the entire procedure. Vital signs, pulse oximetry, and response to verbal commands were monitored and recorded by the nurse throughout the procedure and the recovery period. Medical information was entered in the medical record including the medications and dosages used. The patient returned to baseline neurologic and physiologic status prior to leaving the department. No immediate sedation related complications were noted. Medication for conscious sedation was administered via IV route. 45 minutes of conscious sedation was provided. Following universal protocol, patient and site verification was performed with a \"timeout\" prior to the procedure. The patient was placed on the CT table in supine  position and the right lateral abdomen area was prepped and draped in usual sterile fashion. Using the usual sterile conditions, lidocaine and CT guidance, the fluid collection within soft tissue density  was accessed using a Yueh needle. After confirmation of appropriate localization of the needle, aspiration yielded a thick red to yellow fluid which was sent for microbiology and cytology analysis. Following that an Amplatz wire was placed through the Yueh sheath into the abscess under CT guidance. The tract was serially dilated with 6, 8, and 10 [place] dilators respectively. Following that a 10 Citizen of the [place] drainage catheter was advanced over the wire into the abscess under CT guidance and the anchoring loop was formed. Catheter was sutured to the skin and and secured with Percufix device. The patient tolerated the procedure well. No immediate complications identified.   The patient left the CT suite in supine position to the recovery room in stable condition. Total local anesthetic used: lidocaine, approximately 15 mL. Estimated blood loss:  None. 1458 Patient to CT via cart. Patient transferred to the table with 3 person assistance. Monitors and oxygen applied. Consent verified. Dr. Cerda reviewing scans. CT ABDOMEN PELVIS 7/18/2022    The visualized bases of the lungs shows no consolidating pneumonia or pleural effusion. There is atelectasis seen in the bases. The thoracic aorta and visualized portion is tortuous and mildly dilated at 4 cm in greatest transverse diameter which is stable. . In the right lower quadrant of the abdomen there are multiple bowel loops seen that are peripheral to hypodense collections which could be from fluid or proteinaceous collections such as abscess. This the loops of bowel appear to be a ascending colon and  ileum. The largest walled off collections measure 4.5 x 3.8 x 3.3 cm and 4.4 x 3.4 x 4.2 cm. There is pericolic stranding seen. The left colon is unremarkable. A cystic structure is again seen in the pelvis that measures 6.7 x 8.6 x 9.8 cm. A small amount of free fluid is seen in the posterior pelvis. Bilateral inguinal hernias are seen. The hernia on the left contains fat. The hernia on the right contains a loop of small bowel. No bowel obstruction is seen. The liver is decreased in attenuation and is enlarged. The spleen, pancreas and gallbladder and adrenal glands are unremarkable. Bilaterally both kidneys show uptake of contrast with no evidence for hydronephrosis or renal calculi. Prominent atherosclerotic calcifications are seen. The bladder is partially decompressed. The bladder wall however also appears to be thickened. Degenerative changes are seen in the spine at multiple levels. Intervertebral disc space narrowing is seen at L1 to. Slight anterolisthesis of L4 on L5 is noted. There is vacuum anomaly seen at all levels in the lumbar spine. Impression: 1.  In the right lower quadrant of the abdomen, pericolic abscesses versus pericolic mass is seen. This is thought more likely abscess. 2. A cystic mass is again seen in the pelvis and is likely in the ovary. It is unchanged from previous examination The patient will be given oral contrast and rescanned to evaluate the right lower quadrant. Labs:    Labs reviewed and discussed with patient and staff    Lab Results   Component Value Date/Time    POCGLU 113 02/06/2021 04:16 PM    POCGLU 134 02/06/2021 11:21 AM     Lab Results   Component Value Date/Time     07/22/2022 07:50 AM    K 3.3 07/22/2022 07:50 AM     07/22/2022 07:50 AM    CO2 23 07/22/2022 07:50 AM    BUN 7 07/22/2022 07:50 AM    CREATININE 0.30 07/22/2022 07:50 AM    CALCIUM 7.9 07/22/2022 07:50 AM    LABALBU 2.4 07/22/2022 07:50 AM    BILITOT 0.5 07/22/2022 07:50 AM    ALKPHOS 55 07/22/2022 07:50 AM    AST 21 07/22/2022 07:50 AM    ALT 16 07/22/2022 07:50 AM     Lab Results   Component Value Date/Time    WBC 11.1 07/22/2022 07:50 AM    RBC 3.85 07/22/2022 07:50 AM    HGB 10.3 07/22/2022 07:50 AM    HCT 33.1 07/22/2022 07:50 AM    MCV 85.8 07/22/2022 07:50 AM    MCH 26.8 07/22/2022 07:50 AM    MCHC 31.2 07/22/2022 07:50 AM    RDW 14.4 07/22/2022 07:50 AM     07/22/2022 07:50 AM     Lab Results   Component Value Date/Time    VITD25 56.0 10/02/2020 11:59 AM     Lab Results   Component Value Date/Time    COLORU Yellow 07/18/2022 12:30 PM    NITRU Negative 07/18/2022 12:30 PM    GLUCOSEU Negative 07/18/2022 12:30 PM    KETUA TRACE 07/18/2022 12:30 PM    UROBILINOGEN 0.2 07/18/2022 12:30 PM    BILIRUBINUR Negative 07/18/2022 12:30 PM     Lab Results   Component Value Date/Time    PROTIME 15.0 01/25/2021 05:00 PM     Lab Results   Component Value Date/Time    INR 1.2 01/25/2021 05:00 PM         I discussed results with patient.     Current Rehabilitation Assessments:    Rehabilitation:  Physical Therapy  Bed mobility:  Bed mobility  Rolling to Left: Contact guard assistance (07/22/22 0935)  Rolling to Right: Contact guard assistance (07/22/22 0935)  Supine to Sit: Minimal assistance (07/22/22 0935)  Sit to Supine: Moderate assistance (07/22/22 0935)  Bed Mobility Comments: several rolling trials for hygiene d/t bowel/bladder incontinence, use of bed rails and HOB flat. Encouraged log roll with tactile cues. Increased time and effort, dizziness reported upon initial sitting posture which resolves quickly (07/22/22 0935)  Transfers:  Transfers  Sit to Stand: Minimal Assistance (07/22/22 0937)  Stand to sit: Minimal Assistance (07/22/22 2995)  Comment: decreased safety with hand placement despite VC and TC (07/22/22 9360)  Gait:   Ambulation  Surface: level tile (07/22/22 0937)  Device: Standard Walker (limited by equipment in the room) (07/22/22 4253)  Other Apparatus: O2 (3L) (07/22/22 0937)  Assistance: Minimal assistance (07/22/22 0937)  Gait Deviations: Slow Lea;Decreased step length;Decreased step height;Decreased head and trunk rotation (07/22/22 0937)  Distance: 3ft x 2 (forward and retro, sidestepping near EOB) (07/22/22 8341)  Comments: assist with balance reactions and walker management, appears SOB (07/22/22 0937)  Stairs:     W/C mobility:         Occupational therapy:   Hand Dominance: Right  ADL  Feeding: Setup (07/22/22 0925)  Grooming: Minimal assistance (07/22/22 0925)  UE Bathing: Setup (07/22/22 0925)  LE Bathing: Maximum assistance (07/22/22 0925)  UE Dressing: Minimal assistance (07/22/22 0925)  LE Dressing: Maximum assistance (07/22/22 0925)  Toileting: Dependent/Total (Therapist assist to perform hygiene and change brief) (07/22/22 5123)  Additional Comments:  All ADLs simulated as above (07/22/22 0925)  Toilet Transfers  Toilet Transfers Comments: Not formally assessed, anticipate Min A based on functional observation (07/22/22 0927)  Tub Transfers  Tub Transfers: Not tested (07/22/22 3590)  Shower Transfers  Shower Transfers: Not tested (07/22/22 8888)      Speech therapy:            Diet/Swallow:                         COGNITION  OT:    SP: Re-evals pending      Past Medical History:        Diagnosis Date    Ascending aortic aneurysm ([facility] [address] 6/23/2017    Charcot Arleen Tooth muscular atrophy     CHF (congestive heart failure) (HCC)     Chronic diastolic CHF (congestive heart failure) ([facility] [address] 4/1/2022    Depression     Descending aortic aneurysm ([facility] [address] 6/23/2017    Essential hypertension 2/16/2018    Headache     HTN (hypertension)     Impaired mobility and activities of daily living     Lumbar stenosis with neurogenic claudication     Myelopathy [facility])     Osteoarthritis          PastSurgical History:        Procedure Laterality Date    JOINT REPLACEMENT Bilateral     knees    OTHER SURGICAL HISTORY Right 07/21/2022    cat scan guided abdominal mass aspiration with drain placement by Dr. Alec Mcpherson Left 02/25/2021    LEFT PLEURAL CATHETER INSERTION performed by Kirt Felipe MD at [place] [id number] Left 01/29/2021    total of 755 cc removed per Dr Palafox Nails specimen sent to lab         Allergies:     Allergies   Allergen Reactions    Latex     Tape Zoe Cecilia Tape]           CurrentMedications:   Current Facility-Administered Medications: metoprolol (LOPRESSOR) injection 5 mg, 5 mg, IntraVENous, Q6H  [Held by provider] carvedilol (COREG) tablet 6.25 mg, 6.25 mg, Oral, BID  citalopram (CELEXA) tablet 20 mg, 20 mg, Oral, Daily  [Held by provider] digoxin (LANOXIN) tablet 125 mcg, 125 mcg, Oral, Daily  ferrous sulfate (IRON 325) tablet 325 mg, 325 mg, Oral, Every Other Day  levothyroxine (SYNTHROID) tablet 88 mcg, 88 mcg, Oral, Daily  [Held by provider] rivaroxaban (XARELTO) tablet 15 mg, 15 mg, Oral, Daily  [Held by provider] amLODIPine (NORVASC) tablet 5 mg, 5 mg, Oral, Daily  morphine sulfate (PF) injection 4 mg, 4 mg, IntraVENous, Once  ondansetron (ZOFRAN) injection 4 mg, 4 mg, IntraVENous, Once  sodium chloride flush 0.9 % injection 5-40 mL, 5-40 mL, IntraVENous, 2 times per day  sodium chloride flush 0.9 % injection 5-40 mL, 5-40 mL, IntraVENous, PRN  0.9 % sodium chloride infusion, , IntraVENous, PRN  ondansetron (ZOFRAN-ODT) disintegrating tablet 4 mg, 4 mg, Oral, Q8H PRN **OR** ondansetron (ZOFRAN) injection 4 mg, 4 mg, IntraVENous, Q6H PRN  polyethylene glycol (GLYCOLAX) packet 17 g, 17 g, Oral, Daily PRN  acetaminophen (TYLENOL) tablet 650 mg, 650 mg, Oral, Q6H PRN **OR** acetaminophen (TYLENOL) suppository 650 mg, 650 mg, Rectal, Q6H PRN  piperacillin-tazobactam (ZOSYN) 3,375 mg in dextrose 5 % 50 mL IVPB extended infusion (mini-bag), 3,375 mg, IntraVENous, Q8H  ketorolac (TORADOL) injection 15 mg, 15 mg, IntraVENous, Q6H PRN      Social History:  Social History     Socioeconomic History    Marital status:       Spouse name: Not on file    Number of children: Not on file    Years of education: Not on file    Highest education level: Not on file   Occupational History    Not on file   Tobacco Use    Smoking status: Never    Smokeless tobacco: Never   Substance and Sexual Activity    Alcohol use: No     Alcohol/week: 0.0 standard drinks    Drug use: No    Sexual activity: Not on file   Other Topics Concern    Not on file   Social History Narrative    , Lives With: Alone, son lives down the street, dtr is in the area    Type of Home: [address]: One level    Home Access: Stairs to enter with rails- Number of Steps: 2- Rails: Both    Bathroom Shower/Tub: Tub/Shower unit, Bathroom Equipment: Grab bars in shower, Shower chair    Home Equipment: Rolling walker, Cane(Pt infrequemtly uses DME for ambulation and prefers to furniture walk in home)    ADL Assistance: Independent, [facility] Responsibilities: Yes    Ambulation Assistance: Independent, Transfer Assistance: Independent    Additional Comments: Son stops over 2 times Physical Exam:  /82   Pulse 77   Temp 97.7 °F (36.5 °C) (Oral)   Resp 16   Ht 5' 6\" (1.676 m)   Wt 164 lb 6.4 oz (74.6 kg)   SpO2 98%   BMI 26.53 kg/m²      Physical Exam  Constitutional:       General: She is not in acute distress. Appearance: She is well-developed. She is ill-appearing. She is not toxic-appearing or diaphoretic. HENT:      Head: Normocephalic. Not macrocephalic and not microcephalic. No raccoon eyes or Hurtado's sign. Mouth/Throat:      Pharynx: Oropharyngeal exudate present. Eyes:      General: No scleral icterus. Right eye: No discharge. Left eye: No discharge. Conjunctiva/sclera: Conjunctivae normal.      Pupils: Pupils are equal, round, and reactive to light. Neck:      Thyroid: No thyromegaly. Vascular: Normal carotid pulses. No carotid bruit, hepatojugular reflux or JVD. Trachea: No tracheal deviation. Pulmonary:      Effort: Pulmonary effort is normal.      Breath sounds: No stridor. Decreased breath sounds present. Abdominal:      General: There is distension. Tenderness: There is generalized abdominal tenderness. Musculoskeletal:      Cervical back: Normal range of motion. Tenderness present. Spinous process tenderness and muscular tenderness present. Thoracic back: Tenderness present. Decreased range of motion. Lumbar back: Tenderness present. Decreased range of motion. Skin:     General: Skin is warm and dry. Coloration: Skin is pale. Findings: Bruising present. Comments: Old IV sites   Neurological:      Sensory: Sensory deficit present. Coordination: Coordination abnormal.      Gait: Gait abnormal.      Deep Tendon Reflexes:      Reflex Scores:       Tricep reflexes are 1+ on the right side and 1+ on the left side. Bicep reflexes are 1+ on the right side and 1+ on the left side. Brachioradialis reflexes are 1+ on the right side and 1+ on the left side. Patellar reflexes are 0 on the right side and 0 on the left side. Achilles reflexes are 0 on the right side and 0 on the left side. Psychiatric:         Attention and Perception: She is attentive. Mood and Affect: Mood is not anxious or depressed. Affect is not angry. Speech: Speech is delayed. Speech is not rapid and pressured. Behavior: Behavior is slowed. Behavior is not withdrawn. Thought Content: Thought content normal.         Cognition and Memory: Memory is not impaired. She exhibits impaired recent memory. She does not exhibit impaired remote memory. Judgment: Judgment is not impulsive or inappropriate. Ortho Exam  Neurologic Exam     Mental Status   Level of consciousness: alert  Knowledge: good. Cranial Nerves     CN III, IV, VI   Pupils are equal, round, and reactive to light.     Gait, Coordination, and Reflexes     Reflexes   Right brachioradialis: 1+  Left brachioradialis: 1+  Right biceps: 1+  Left biceps: 1+  Right triceps: 1+  Left triceps: 1+  Right patellar: 0  Left patellar: 0  Right achilles: 0  Left achilles: 0          Diagnostics:    Recent Results (from the past 24 hour(s))   CEA    Collection Time: 07/21/22 12:26 PM   Result Value Ref Range    CEA 1.2 <3.9 ng/mL   CANCER ANTIGEN 19-9    Collection Time: 07/21/22 12:26 PM   Result Value Ref Range    CA 19-9 7 0 - 35 U/mL   Culture, Surgical    Collection Time: 07/21/22  3:50 PM    Specimen: Abdomen   Result Value Ref Range    Culture Surgical       Direct Exam:  MANY NEUTROPHILS  Direct Exam:  FEW GRAM POSITIVE COCCI IN PAIRS  Direct Exam:  FEW GRAM POSITIVE COCCI IN CHAINS  Culture, Surgical:  PENDING  Performed at [address]  ([phone]     Comprehensive Metabolic Panel w/ Reflex to MG    Collection Time: 07/22/22  7:50 AM   Result Value Ref Range    Sodium 137 135 - 144 mEq/L    Potassium reflex Magnesium 3.3 (L) 3.4 - 4.9 mEq/L    Chloride 101 95 - 107 mEq/L    CO2 23 20 - 31 mEq/L    Anion Gap 13 9 - 15 mEq/L    Glucose 102 (H) 70 - 99 mg/dL    BUN 7 (L) 8 - 23 mg/dL    Creatinine 0.30 (L) 0.50 - 0.90 mg/dL    GFR Non-African American >60.0 >60    GFR  >60.0 >60    Calcium 7.9 (L) 8.5 - 9.9 mg/dL    Total Protein 6.0 (L) 6.3 - 8.0 g/dL    Albumin 2.4 (L) 3.5 - 4.6 g/dL    Total Bilirubin 0.5 0.2 - 0.7 mg/dL    Alkaline Phosphatase 55 40 - 130 U/L    ALT 16 0 - 33 U/L    AST 21 0 - 35 U/L    Globulin 3.6 (H) 2.3 - 3.5 g/dL   CBC with Auto Differential    Collection Time: 07/22/22  7:50 AM   Result Value Ref Range    WBC 11.1 (H) 4.8 - 10.8 K/uL    RBC 3.85 (L) 4.20 - 5.40 M/uL    Hemoglobin 10.3 (L) 12.0 - 16.0 g/dL    Hematocrit 33.1 (L) 37.0 - 47.0 %    MCV 85.8 82.0 - 100.0 fL    MCH 26.8 (L) 27.0 - 31.3 pg    MCHC 31.2 (L) 33.0 - 37.0 %    RDW 14.4 11.5 - 14.5 %    Platelets 312 931 - 368 K/uL    Neutrophils % 84.5 %    Lymphocytes % 5.5 %    Monocytes % 6.7 %    Eosinophils % 2.9 %    Basophils % 0.4 %    Neutrophils Absolute 9.4 (H) 1.4 - 6.5 K/uL    Lymphocytes Absolute 0.6 (L) 1.0 - 4.8 K/uL    Monocytes Absolute 0.7 0.2 - 0.8 K/uL    Eosinophils Absolute 0.3 0.0 - 0.7 K/uL    Basophils Absolute 0.0 0.0 - 0.2 K/uL   Magnesium    Collection Time: 07/22/22  7:50 AM   Result Value Ref Range    Magnesium 1.7 1.7 - 2.4 mg/dL              Impression:    Impaired mobility and ADLs due to  Charcot Arleen Tooth Neuropathy  Factors favoring recovery include:  good family support at home        Complex Active General Medical Issues that complicate care and require daily medical supervision: 1. 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 *            Recommendations:    Considering all of the factors above including the patient's current medical status, social status/home environment, their functional needs, and their ability to participate in a therapy program, I feel that they would best be served at:    acute intensive comprehensive inpatient rehabilitation program.  Due to the diagnoses above the patient has had significant decline in function and is now requiring acute intensive therapy to optimize function. They are expected to achieve meaningful functional gains. Due to medical complexity as above, rehabilitation physician services is reasonable and necessary including face-to-face visits at least 3 days/week with anticipated need to treat, manage and modify the rehabilitation course of treatment including interdisciplinary team conferences. They will require rehab physician care to monitor for neurogenic bowel bladder, postoperative pain, titration of opiate and high risk medications,    blood pressure and blood sugar control. It is my opinion that they will be able to tolerate and benefit from 3 hours of therapy a day. I reviewed the various options re: levels of care with the patient and family. Please see pre-admission screen note for further details. I discussed acute rehab with the patient and verify that the patient is able and willing to participate in 3 hours of therapy a day. Rehab and Acute Care Case Management has also reinforced this expectation. This patient requires multidisciplinary rehabilitation treatment, including daily care and management from a PM&R physician, 24-hour rehabilitation nursing, Physical Therapy, Occupational Therapy, rehabilitation psychology, consideration of speech and language pathology, recreational therapy, nutritional services, and a rehabilitation . I feel that it is reasonable to plan for a discharge to home setting after acute rehab. Specialized nursing care to focus on: Bowel and bladder issues-Monitor for urinary retention-check PVRs, bladder scan--cath if no void. Wound management re   -pressure relief protocols-side to side turns  IV medication administration -IV Zosyn     Monitor endurance and if necessary spread therapy out over a 7-day window-adding scheduled rest breaks when needed. Focus on energy conservation. Monitor heart rate and   cardiac medications effects on heart rate and blood pressure before, during and after therapy. Progress toward endurance training with pulse ox monitoring for saturation and heart rate. improvements in PO intake-- Improve hydration and nutrition by adding Vitamin B12 shot times one, adding Protein supplements and push PO fluids. Treat and monitor for higher level cognitive deficits, focus on difficulty with sequencing and problem-solving. Focus on higher-level balance and falls risk issues focusing on balance training and monitoring for orthostasis. Above recommendations are indicated to address medical complexity and need for appropriate rehab services. Will tailor individual care and rehab plan per individuals needs re  endurance.     Focus of today's plan-   OK to acute rehab--ID can see while on rehab      Required Certification Data (potential inpatient rehabilitation facility patient's only)    Deficits:weakness, nutrition, mobility, impaired gait, high risk for falls, decreased endurance, deconditioning , debility, cardiovascular compromise, ADL's, adjustment to disability, impaired mobility level increasing the risk of venous thromboembolism, and healing surgical sites, pain limiting function, balance and righting reactions, cognitive deficits , and oropharyngeal dysphagia    Disability:mobility, locomotion, and self care    Potential barriers to progress/discharge:complex medical conditions         It was my pleasure to evaluate [address] today. Please call [phone] with questions.     Tommy Lim, DO

## 2022-07-22 NOTE — PROGRESS NOTES
[facility] OCCUPATIONAL THERAPY EVALUATION - ACUTE     NAME: Monica Adame  : 1932 (80 y.o.)  MRN: [id number]  CODE STATUS: Full Code  Room: I6G109-11    Date of Service: 2022    Patient Diagnosis(es): Abdominal pain [R10.9]  Right sided abdominal pain [R10.9]  Abdominal mass, unspecified abdominal location [R19.00]   Patient Active Problem List    Diagnosis Date Noted    A-fib [facility])     Impaired mobility and activities of daily living due to CHF exac, [facility] re-admit 2021     Abdominal mass 2022    Right lower quadrant abdominal pain 2022    Hyponatremia 2022    Hypokalemia 2022    Anemia 2022    CHF (congestive heart failure) ([address] 2022    Hypothyroid 2022    Lumbar stenosis with neurogenic claudication 2016    Chronic diastolic CHF (congestive heart failure) ([address] 2022    Acute cystitis with hematuria 2021    Impaired mobility 2021    Acute on chronic combined systolic (congestive) and diastolic (congestive) heart failure ([address] 2021    Pleural effusion 2021    Hypoxia     Acute pulmonary edema (HCC)     Gait abnormality 2021    Acute on chronic combined systolic and diastolic CHF (congestive heart failure) ([address] 2021    Essential hypertension 2018    Shortness of breath     Dizziness     Ascending aortic aneurysm ([address] 2017    Descending aortic aneurysm ([address] 2017    Osteoarthritis     Myelopathy ([address]     Headache     Depression     Acquired scoliosis 2016    Osteoporosis 2016    Charcot Raleen Tooth muscular atrophy 2016    Excess weight 2016    Osteoarthritis of lumbar spine with myelopathy 2016        Past Medical History:   Diagnosis Date    Ascending aortic aneurysm ([address] 2017    Charcot Arleen Tooth muscular atrophy     CHF (congestive heart failure) (HCC)     Chronic diastolic CHF (congestive heart failure) ([address] 2022 Depression     Descending aortic aneurysm ([facility] 6/23/2017    Essential hypertension 2/16/2018    Headache     HTN (hypertension)     Impaired mobility and activities of daily living     Lumbar stenosis with neurogenic claudication     Myelopathy ([facility]     Osteoarthritis      Past Surgical History:   Procedure Laterality Date    JOINT REPLACEMENT Bilateral     knees    OTHER SURGICAL HISTORY Right 07/21/2022    cat scan guided abdominal mass aspiration with drain placement by Dr. Tiffany Rodriguez Left 02/25/2021    LEFT PLEURAL CATHETER INSERTION performed by Amara Nassar MD at [place] [address] Left 01/29/2021    total of 755 cc removed per Dr Cerda specimen sent to lab        Restrictions  Restrictions/Precautions: Fall Risk (High per negro score)  Required Braces or Orthoses?: No         Other position/activity restrictions: Abdominal drain    Safety Devices: Safety Devices  Type of Devices: Bed alarm in place;Call light within reach; Left in bed (family present)     Patient's date of birth confirmed: Yes    General:  Chart Reviewed: Yes  Patient assessed for rehabilitation services?: Yes  Family / Caregiver Present: Yes (Son present)    Subjective  Subjective: Patient agreeable to evaluaiton       Pain at start of treatment: Denies    Pain at end of treatment: Denies      Prior Level of Function:  Social/Functional History  Lives With: Alone  Type of Home: House  Home Layout: One level  Home Access: Stairs to enter with rails  Entrance Stairs - Number of Steps: 2  Entrance Stairs - Rails: Both  Bathroom Shower/Tub: Tub/Shower unit  Bathroom Equipment: Shower chair, Grab bars in shower  Home Equipment: Cane  ADL Assistance: [facility] [address]: Independent  Homemaking Responsibilities: Yes  Meal Prep Responsibility: Primary  Laundry Responsibility: Primary  Cleaning Responsibility: Primary  Shopping Responsibility: No (Son performs)  Ambulation Assistance: Independent (furniture walking and cane prn)  Transfer Assistance: Independent  Active : No  Mode of Transportation: Family  Additional Comments: Social-functional information provided by son    OBJECTIVE:     Orientation Status:  Orientation  Overall Orientation Status: Within Functional Limits    Observation:  Observation/Palpation  Observation: Patient with abdominal dressing intact with drain present    Cognition Status:  Cognition  Overall Cognitive Status: Exceptions  Arousal/Alertness: Appropriate responses to stimuli  Following Commands:  Follows one step commands with repetition  Attention Span: Appears intact  Memory: Appears intact  Safety Judgement: Decreased awareness of need for assistance;Decreased awareness of need for safety  Problem Solving: Assistance required to generate solutions;Assistance required to implement solutions;Assistance required to identify errors made;Assistance required to correct errors made  Insights: Fully aware of deficits  Initiation: Requires cues for some  Sequencing: Requires cues for some    Perception Status:  Perception  Overall Perceptual Status: [facility]    Vision and Hearing Status:  Hearing  Hearing: Exceptions to [facility]  Hearing Exceptions: Hard of hearing/hearing concerns        GROSS ASSESSMENT AROM/PROM:  AROM: Generally decreased, functional  PROM: Generally decreased, functional    ROM:   LUE AROM (degrees)  LUE AROM : WFL  Left Hand AROM (degrees)  Left Hand AROM: WFL  RUE AROM (degrees)  RUE AROM : WFL  Right Hand AROM (degrees)  Right Hand AROM: WFL    UE STRENGTH:  Strength: Grossly decreased, non-functional (Gross Assessment: BUE 3/5)    UE COORDINATION:  Coordination: Generally decreased, functional    UE TONE:  Tone: Normal    UE SENSATION:  Sensation: Intact    Hand Dominance:  Hand Dominance  Hand Dominance: Right    ADL Status:  ADL  Feeding: Setup  Grooming: Minimal assistance  UE Bathing: Setup  LE Bathing: Maximum assistance  UE Dressing: Minimal assistance  LE Dressing: Maximum assistance  Toileting: Dependent/Total (Therapist assist to perform hygiene and change brief)  Additional Comments: All ADLs simulated as above  Toilet Transfers  Toilet Transfers Comments: Not formally assessed, anticipate Min A based on functional observation  Tub Transfers  Tub Transfers: Not tested    Functional Mobility:  Patient ambulated to head of bed with [employer] at [address] A level. Bed Mobility  Bed mobility  Supine to Sit: Minimal assistance  Sit to Supine: Minimal assistance  Bed Mobility Comments: Dizziness reported with initially; however, resolves    Seated and Standing Balance:  Balance  Sitting:  (SBA)  Standing:  (Min A; patient with 1 epsisode of posterior LOB)    Functional Endurance:  Activity Tolerance  Activity Tolerance: Patient Tolerated treatment well    D/C Recommendations:  OT D/C RECOMMENDATIONS  REQUIRES OT FOLLOW-UP: Yes    Equipment Recommendations:  OT Equipment Recommendations  Other: Continue to assess    OT Education:   Patient Education  Education Given To: Patient; Family  Education Provided: Role of Therapy;Plan of Care  Barriers to Learning: Other (Comment) (kian barrier)  Education Outcome: Continued education needed    OT Follow Up:   OT D/C RECOMMENDATIONS  REQUIRES OT FOLLOW-UP: Yes       Assessment/Discharge Disposition:  Assessment: Patient is a 80year old female who presents to [facility] from home along with the above deficits. Patient would benefit from continued occupational therapy to increase safety and independence with self-care skills.   Performance deficits / Impairments: Decreased functional mobility , Decreased safe awareness, Decreased balance, Decreased coordination, Decreased ADL status, Decreased endurance, Decreased high-level IADLs, Decreased strength  Prognosis: Good  Discharge Recommendations: Continue to assess pending progress  Decision Making: Medium Complexity  History: Multiple comorbidities  Exam: 8 performance deficits  Assistance / Modification: Min-Max A    AMPAC (Six Click) Self care Score   How much help for putting on and taking off regular lower body clothing?: A Lot  How much help for Bathing?: A Lot  How much help for Toileting?: Total  How much help for putting on and taking off regular upper body clothing?: A Little  How much help for taking care of personal grooming?: A Little  How much help for eating meals?: None  AM-[facility] Inpatient Daily Activity Raw Score: 15  AM-PAC Inpatient ADL T-Scale Score : 34.69  ADL Inpatient CMS 0-100% Score: 56.46    Therapy key for assistance levels -   Independent/Mod I = Pt. is able to perform task with no assistance but may require a device   Stand by assistance = Pt. does not perform task at an independent level but does not need physical assistance, requires verbal cues  Minimal, Moderate, Maximal Assistance = Pt. requires physical assistance (25%, 50%, 75% assist from helper) for task but is able to actively participate in task   Dependent = Pt. requires total assistance with task and is not able to actively participate with task completion     Plan:  Plan  Times per Week: 1-4x/week  Plan Weeks: Length of acute care stay  Current Treatment Recommendations: Strengthening, Balance training, Functional mobility training, Endurance training, Safety education & training, Neuromuscular re-education, Patient/Caregiver education & training, Equipment evaluation, education, & procurement, Home management training, Self-Care / ADL, Cognitive/Perceptual training, Coordination training    Goals:   Patient will:    - Improve functional endurance to tolerate/complete 30 mins of ADL's  - Be Indepednent in UB ADLs   - Be Modified Independent in LB ADLs  - Be Modified Independent in ADL transfers without LOB  - Be Independent in toileting tasks  - Improve bilateral UE strength and endurance to 4/5 in order to participate in self-care activities as projected.   - Sequence self-care tasks with no cues for safety awareness  - Other :  improve standing balance to complete ADLs as projected. Patient Goal: Patient goals :  To return home      Discussed and agreed upon: Yes Comments:       Therapy Time:   Individual   Time In 0855   Time Out 0915   Minutes 20          Eval: 20 minutes     Electronically signed by:    SERAFIN Diana,   7/22/2022, 9:35 AM

## 2022-07-22 NOTE — DISCHARGE INSTR - COC
Continuity of Care Form    Patient Name: Hannah Andre   :  1932  MRN:  [id number]    Admit date:  2022  Discharge date:  ***    Code Status Order: Full Code   Advance Directives:     Admitting Physician: Bea Corrales MD  PCP: Tom Marcos MD    Discharging Nurse: [facility] Unit/Room#: L189/C402-38  Discharging Unit Phone Number: ***    Emergency Contact:   Extended Emergency Contact Information  Primary Emergency Contact: [address] [facility] [address] Phone: [phone]  Relation: Child  Secondary Emergency Contact: [address] [facility]  Mobile Phone: [phone]  Relation: Child    Past Surgical History:  Past Surgical History:   Procedure Laterality Date    JOINT REPLACEMENT Bilateral     knees    OTHER SURGICAL HISTORY Right 2022    cat scan guided abdominal mass aspiration with drain placement by Dr. Daphne Prado Left 2021    LEFT PLEURAL CATHETER INSERTION performed by Kelvin Castano MD at [place] [address] Left 2021    total of 755 cc removed per Dr Gold Myers specimen sent to lab       Immunization History: There is no immunization history on file for this patient. Active Problems:  Patient Active Problem List   Diagnosis Code    Lumbar stenosis with neurogenic claudication M48.062    Acquired scoliosis M41.9    Osteoporosis M81.0    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    Excess weight E66.3    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    Osteoarthritis M19.90    Myelopathy (HCC) G95.9    Impaired mobility and activities of daily living due to CHF exac, [facility] re-admit 2021 Z74.09, Z78.9    Headache R51.9    Depression F32. A    Ascending aortic aneurysm (HCC) I71.2    Descending aortic aneurysm (HCC) I71.9    Dizziness R42    Shortness of breath R06.02    Essential hypertension I10    Acute on chronic combined systolic and diastolic CHF (congestive heart failure) ([facility]) I50.43    Gait abnormality R26.9    A-fib ([facility]) I48.91    Pleural effusion J90    Hypoxia R09.02    Acute pulmonary edema ([facility]) J81.0    Acute on chronic combined systolic (congestive) and diastolic (congestive) heart failure ([facility]) I50.43    Impaired mobility Z74.09    Acute cystitis with hematuria N30.01    Chronic diastolic CHF (congestive heart failure) ([facility]) I50.32    Right lower quadrant abdominal pain R10.31    Hyponatremia E87.1    Hypokalemia E87.6    Anemia D64.9    CHF (congestive heart failure) ([facility]) I50.9    Hypothyroid E03.9    Abdominal mass R19.00    Central retinal vein occlusion, left eye, stable H34.8122       Isolation/Infection:   Isolation            No Isolation          Patient Infection Status       Infection Onset Added Last Indicated Last Indicated By Review Planned Expiration Resolved Resolved By    None active    Resolved    COVID-19 (Rule Out) 02/19/21 02/19/21 02/19/21 COVID-19, Rapid (Ordered)   02/19/21 Rule-Out Test Resulted    COVID-19 (Rule Out) 02/10/21 02/10/21 02/10/21 COVID-19 (Ordered)   02/10/21 Rule-Out Test Resulted    COVID-19 (Rule Out) 02/09/21 02/09/21 02/09/21 COVID-19 (Ordered)   02/09/21 Rule-Out Test Resulted    COVID-19 (Rule Out) 02/02/21 02/02/21 02/02/21 COVID-19 (Ordered)   02/02/21 Rule-Out Test Resulted    COVID-19 (Rule Out) 01/25/21 01/25/21 01/26/21 COVID-19 (Ordered)   01/26/21 Rule-Out Test Resulted    COVID-19 (Rule Out) 01/25/21 01/25/21 01/25/21 COVID-19 (Ordered)   01/25/21 Rule-Out Test Resulted            Nurse Assessment:  Last Vital Signs: /82   Pulse 77   Temp 97.7 °F (36.5 °C) (Oral)   Resp 16   Ht 5' 6\" (1.676 m)   Wt 164 lb 6.4 oz (74.6 kg)   SpO2 98%   BMI 26.53 kg/m²     Last documented pain score (0-10 scale): Pain Level: 2  Last Weight:   Wt Readings from Last 1 Encounters:   07/21/22 164 lb 6.4 oz (74.6 kg)     Mental Status:  {IP PT MENTAL STATUS:20030}    IV Access:  {[facility] [place] IV ACCESS:781741999}    Nursing Mobility/ADLs:  Walking   {[facility] DME HIDT:325064080}  Transfer  {CHP DME VONQ:326879422}  Bathing  {CHP DME LN}  Dressing  {CHP DME RKCA:285819376}  Toileting  {CHP DME JYMR:332736472}  Feeding  {CHP DME OMGC:592127822}  Med Admin  {CHP DME PFXM:489886336}  Med Delivery   {[facility] MED Delivery:368765215}    Wound Care Documentation and Therapy:  Incision 21 Abdomen Lateral;Left;Upper (Active)   Number of days: 512        Elimination:  Continence: Bowel: {YES / AF:57243}  Bladder: {YES / KB:99389}  Urinary Catheter: {Urinary Catheter:419571742}   Colostomy/Ileostomy/Ileal Conduit: {YES / JOSUE:18452}       Date of Last BM: ***    Intake/Output Summary (Last 24 hours) at 2022 1410  Last data filed at 2022 1136  Gross per 24 hour   Intake 1515.63 ml   Output 10 ml   Net 1505.63 ml     I/O last 3 completed shifts:   In: 1752.1 [P.O.:80; I.V.:1297.3; IV Piggyback:374.8]  Out: -     Safety Concerns:     [address] [facility] Safety Concerns:347588534}    Impairments/Disabilities:      [address] [facility] Impairments/Disabilities:553813861}    Nutrition Therapy:  Current Nutrition Therapy:   [address] [facility] [facility] Diet List:526165095}    Routes of Feeding: {P DME Other Feedings:367571348}  Liquids: {Slp liquid thickness:03396}  Daily Fluid Restriction: {CHP DME Yes amt example:526667414}  Last Modified Barium Swallow with Video (Video Swallowing Test): {Done Not Done PBSN:798767339}    Treatments at the Time of Hospital Discharge:   Respiratory Treatments: ***  Oxygen Therapy:  {Therapy; copd oxygen:96972}  Ventilator:    { CC Vent [facility]:000718974}    Rehab Therapies: {THERAPEUTIC INTERVENTION:1798021404}  Weight Bearing Status/Restrictions: [address]  Weight Bearin}  Other Medical Equipment (for information only, NOT a DME order):  {EQUIPMENT:485515775}  Other Treatments: ***    Patient's personal belongings (please select all that are sent with patient):  {LYLY DME Belongings:679883614}    RN SIGNATURE:  {Esignature:920211062}    CASE MANAGEMENT/SOCIAL WORK SECTION    Inpatient Status Date: ***    Readmission Risk Assessment Score:  Readmission Risk              Risk of Unplanned Readmission:  [id number]           Discharging to Facility/ Agency   Name:   Address:  Phone:  Fax:    Dialysis Facility (if applicable)   Name:  Address:  Dialysis Schedule:  Phone:  Fax:    / signature: {Hollygnature:364632588}    PHYSICIAN SECTION    Prognosis: Good    Condition at Discharge: Stable    Rehab Potential (if transferring to Rehab): good    Recommended Labs or Other Treatments After Discharge: Cardiology consult for med adjustments; consult ID for Abx; oncology for their opinion of CEA; IR for drain management    Physician Certification: I certify the above information and transfer of Harleen Valencia  is necessary for the continuing treatment of the diagnosis listed and that she requires Acute Rehab for less 30 days.      Update Admission H&P: No change in H&P    PHYSICIAN SIGNATURE:  Electronically signed by Angelina Wasserman MD on 7/22/22 at 2:10 PM EDT

## 2022-07-22 NOTE — PROGRESS NOTES
Physical Therapy Med Surg Initial Assessment  Fac[facility]/Department[facility]  [facility]     NAME: Jesus Campos  : 1932 (80 y.o.)  MRN: [id number]  CODE STATUS: Full Code    Date of Service: 2022    Patient Diagnosis(es): Abdominal pain [R10.9]  Right sided abdominal pain [R10.9]  Abdominal mass, unspecified abdominal location [R19.00]   Chief Complaint   Patient presents with    Abdominal Pain     Patient Active Problem List    Diagnosis Date Noted    A-fib [facility])     Impaired mobility and activities of daily living due to CHF exac, [address] re-admit 2021     Abdominal mass 2022    Right lower quadrant abdominal pain 2022    Hyponatremia 2022    Hypokalemia 2022    Anemia 2022    CHF (congestive heart failure) ([address] 2022    Hypothyroid 2022    Lumbar stenosis with neurogenic claudication 2016    Chronic diastolic CHF (congestive heart failure) ([address] 2022    Acute cystitis with hematuria 2021    Impaired mobility 2021    Acute on chronic combined systolic (congestive) and diastolic (congestive) heart failure ([address] 2021    Pleural effusion 2021    Hypoxia     Acute pulmonary edema (HCC)     Gait abnormality 2021    Acute on chronic combined systolic and diastolic CHF (congestive heart failure) ([address] 2021    Essential hypertension 2018    Shortness of breath     Dizziness     Ascending aortic aneurysm ([address] 2017    Descending aortic aneurysm ([address] 2017    Osteoarthritis     Myelopathy ([address]     Headache     Depression     Acquired scoliosis 2016    Osteoporosis 2016    Charcot Arleen Tooth muscular atrophy 2016    Excess weight 2016    Osteoarthritis of lumbar spine with myelopathy 2016      Past Medical History:   Diagnosis Date    Ascending aortic aneurysm ([address] 2017    Charcot Arleen Tooth muscular atrophy     CHF (congestive heart failure) (HCC)     Chronic diastolic CHF (congestive heart failure) [facility] 2022    Depression     Descending aortic aneurysm ([facility] 2017    Essential hypertension 2018    Headache     HTN (hypertension)     Impaired mobility and activities of daily living     Lumbar stenosis with neurogenic claudication     Myelopathy ([facility]     Osteoarthritis      Past Surgical History:   Procedure Laterality Date    JOINT REPLACEMENT Bilateral     knees    OTHER SURGICAL HISTORY Right 2022    cat scan guided abdominal mass aspiration with drain placement by Dr. Diana Vega Left 2021    LEFT PLEURAL CATHETER INSERTION performed by Luis Fang MD at [place] [address] Left 2021    total of 755 cc removed per Dr Varner Home specimen sent to lab     Chart Reviewed: Yes  Patient assessed for rehabilitation services?: Yes  Family / Caregiver Present: Yes (son and dtr present and acted as [place]  d/t pt's hearing deficits, diffuculty with requires repeat and rephrase throughout session)  General Comment  Comments: Pt resting in bed, agreeable to PT eval with encouragement    Restrictions:  Restrictions/Precautions: Fall Risk (High per negro score)  Position Activity Restriction  Other position/activity restrictions: Abdominal drain     SUBJECTIVE:   Subjective: Pt denies pain at rest. States abdomen feels \"heavy\"    Pain   Pre treatment screenin/10   Post treatment screening 0/10    Prior Level of Function:  Social/Functional History  Lives With: Alone  Type of Home: House  Home Layout: One level  Home Access: Stairs to enter with rails  Entrance Stairs - Number of Steps: 2  Bathroom Shower/Tub: Tub/Shower unit  Bathroom Equipment: Built-in shower seat, Shower chair, Grab bars in [address] [place]: [address]: Independent  Homemaking Assistance: Independent  Homemaking Responsibilities: Yes  Ambulation Assistance: Independent (furnature walking and cane PRN)  Transfer Assistance: Independent  Active : No  Mode of Transportation: Family  Additional Comments: Social-functional information provided by son    OBJECTIVE:   Vision  Vision: Within Functional Limits  Hearing Exceptions: Hard of hearing/hearing concerns    Cognition:  Overall Orientation Status: Within Functional Limits  Follows Commands: Within Functional Limits  Overall Cognitive Status: Exceptions    Observation/Palpation  Posture: Fair (forward head, rounded shoulders, increased T kyphosis)  Observation: alert, pleasant, cooperative, on 3L O2 NC(baseline at home), appears SOB upon exertion, R side abdominal drain present    ROM:  AROM: Generally decreased, functional  PROM: Generally decreased, functional    Strength:  Strength: Generally decreased, functional (functionally >/=3+/5)    Neuro:  Balance  Sitting - Static: Good;-  Sitting - Dynamic: Good;-  Standing - Static: Fair;- (LOB requires mod A to correct despite use of standard walker)  Standing - Dynamic: Fair;-                      Tone: Normal  Coordination: Generally decreased, functional    Sensation: Intact    Bed mobility  Rolling to Left: Contact guard assistance  Rolling to Right: Contact guard assistance  Supine to Sit: Minimal assistance  Sit to Supine: Moderate assistance  Bed Mobility Comments: several rolling trials for hygiene d/t bowel/bladder incontinence, use of bed rails and HOB flat. Encouraged log roll with tactile cues.  Increased time and effort, dizziness reported upon initial sitting posture which resolves quickly    Transfers  Sit to Stand: Minimal Assistance  Stand to sit: Minimal Assistance  Comment: decreased safety with hand placement despite VC and TC    Ambulation  Surface: level tile  Device: Standard Walker (limited by equipment in the room)  Other Apparatus: O2 (3L)  Assistance: Minimal assistance  Gait Deviations: Slow Lea;Decreased step length;Decreased step height;Decreased head and trunk rotation  Distance: 3ft x 2 (forward and retro, sidestepping near EOB)  Comments: assist with balance reactions and walker management, appears SOB                   Activity Tolerance  Activity Tolerance: Patient tolerated evaluation without incident      Patient Education  Education Given To: Patient  Education Provided: Plan of Care;Role of Therapy;Precautions  Education Provided Comments: log roll  Education Method: Verbal  Barriers to Learning: None  Education Outcome: Continued education needed       ASSESSMENT:   Body Structures, Functions, Activity Limitations Requiring Skilled Therapeutic Intervention: Decreased functional mobility ; Decreased strength;Decreased posture;Decreased balance;Decreased endurance  Decision Making: Medium Complexity  History: high  Exam: high  Clinical Presentation: med    Therapy Prognosis: Good  Barriers to Learning: language barrier, heard of hearing         DISCHARGE RECOMMENDATIONS:  Discharge Recommendations: Continue to assess pending progress, Patient would benefit from continued therapy after dischargeAssessment: Pt demonstrates the above deficits and decline in functional mobility status placing them at increased risk for falls. Pt would benefit from physical therapy to address above deficits and allow for safe return home at highest level of function, decrease risk for falls, and improve QOL.          PLAN OF CARE:  Plan  Plan: 1 time a day 3-6 times a week  Current Treatment Recommendations: Strengthening, ROM, Balance training, Functional mobility training, Transfer training, Endurance training, Gait training, Stair training, Cognitive reorientation, Patient/Caregiver education & training, Safety education & training, Equipment evaluation, education, & procurement, Therapeutic activities, Home exercise program, Neuromuscular re-education    Safety Devices  Type of Devices: Bed alarm in place, Call light within reach, Left in bed (family present)    Goals:  Patient goals : does not state, family wishes pt to regain her indep  Long Term Goals  Long term goal 1: Bed mobility with indep  Long term goal 2: Functional transfers with indep  Long term goal 3: Amb 50ft with LRAD and indep  Long term goal 4: 2 steps with handrail with supervision to enter/exit home    AMPAC (6 CLICK) Dania Trejo Raw Score : 15     Therapy Time:   Individual   Time In  0855   Time Out  0915   Minutes  Jung H[place], 07/22/22 at 9:42 AM         Definitions for assistance levels  Independent = pt does not require any physical supervision or assistance from another person for activity completion. Device may be needed.   Stand by assistance = pt requires verbal cues or instructions from another person, close to but not touching, to perform the activity  Minimal assistance= pt performs 75% or more of the activity; assistance is required to complete the activity  Moderate assistance= pt performs 50% of the activity; assistance is required to complete the activity  Maximal assistance = pt performs 25% of the activity; assistance is required to complete the activity  Dependent = pt requires total physical assistance to accomplish the task

## 2022-07-23 LAB
ALBUMIN SERPL-MCNC: 2.6 G/DL (ref 3.5–4.6)
ALP BLD-CCNC: 53 U/L (ref 40–130)
ALT SERPL-CCNC: 20 U/L (ref 0–33)
ANION GAP SERPL CALCULATED.3IONS-SCNC: 8 MEQ/L (ref 9–15)
AST SERPL-CCNC: 20 U/L (ref 0–35)
BASOPHILS ABSOLUTE: 0 K/UL (ref 0–0.2)
BASOPHILS RELATIVE PERCENT: 0.5 %
BILIRUB SERPL-MCNC: 0.4 MG/DL (ref 0.2–0.7)
BUN BLDV-MCNC: 10 MG/DL (ref 8–23)
CALCIUM SERPL-MCNC: 8.5 MG/DL (ref 8.5–9.9)
CHLORIDE BLD-SCNC: 103 MEQ/L (ref 95–107)
CO2: 31 MEQ/L (ref 20–31)
CREAT SERPL-MCNC: 0.52 MG/DL (ref 0.5–0.9)
EOSINOPHILS ABSOLUTE: 0.3 K/UL (ref 0–0.7)
EOSINOPHILS RELATIVE PERCENT: 3.1 %
GFR AFRICAN AMERICAN: >60
GFR NON-AFRICAN AMERICAN: >60
GLOBULIN: 3.4 G/DL (ref 2.3–3.5)
GLUCOSE BLD-MCNC: 125 MG/DL (ref 70–99)
HCT VFR BLD CALC: 31.6 % (ref 37–47)
HEMOGLOBIN: 10.6 G/DL (ref 12–16)
LYMPHOCYTES ABSOLUTE: 0.8 K/UL (ref 1–4.8)
LYMPHOCYTES RELATIVE PERCENT: 8.5 %
MAGNESIUM: 1.7 MG/DL (ref 1.7–2.4)
MCH RBC QN AUTO: 27.8 PG (ref 27–31.3)
MCHC RBC AUTO-ENTMCNC: 33.4 % (ref 33–37)
MCV RBC AUTO: 83.2 FL (ref 82–100)
MONOCYTES ABSOLUTE: 0.7 K/UL (ref 0.2–0.8)
MONOCYTES RELATIVE PERCENT: 8 %
NEUTROPHILS ABSOLUTE: 7.3 K/UL (ref 1.4–6.5)
NEUTROPHILS RELATIVE PERCENT: 79.9 %
PDW BLD-RTO: 14.3 % (ref 11.5–14.5)
PLATELET # BLD: 332 K/UL (ref 130–400)
POTASSIUM REFLEX MAGNESIUM: 3.4 MEQ/L (ref 3.4–4.9)
RBC # BLD: 3.79 M/UL (ref 4.2–5.4)
SODIUM BLD-SCNC: 142 MEQ/L (ref 135–144)
TOTAL PROTEIN: 6 G/DL (ref 6.3–8)
WBC # BLD: 9.1 K/UL (ref 4.8–10.8)

## 2022-07-23 PROCEDURE — 2500000003 HC RX 250 WO HCPCS: Performed by: PHYSICAL MEDICINE & REHABILITATION

## 2022-07-23 PROCEDURE — 85025 COMPLETE CBC W/AUTO DIFF WBC: CPT

## 2022-07-23 PROCEDURE — 80053 COMPREHEN METABOLIC PANEL: CPT

## 2022-07-23 PROCEDURE — 97110 THERAPEUTIC EXERCISES: CPT

## 2022-07-23 PROCEDURE — 97166 OT EVAL MOD COMPLEX 45 MIN: CPT

## 2022-07-23 PROCEDURE — 97116 GAIT TRAINING THERAPY: CPT

## 2022-07-23 PROCEDURE — 1180000000 HC REHAB R&B

## 2022-07-23 PROCEDURE — 97535 SELF CARE MNGMENT TRAINING: CPT

## 2022-07-23 PROCEDURE — 83735 ASSAY OF MAGNESIUM: CPT

## 2022-07-23 PROCEDURE — 6360000002 HC RX W HCPCS: Performed by: INTERNAL MEDICINE

## 2022-07-23 PROCEDURE — 2700000000 HC OXYGEN THERAPY PER DAY

## 2022-07-23 PROCEDURE — 6370000000 HC RX 637 (ALT 250 FOR IP): Performed by: INTERNAL MEDICINE

## 2022-07-23 PROCEDURE — 2580000003 HC RX 258: Performed by: INTERNAL MEDICINE

## 2022-07-23 PROCEDURE — 97162 PT EVAL MOD COMPLEX 30 MIN: CPT

## 2022-07-23 PROCEDURE — 36415 COLL VENOUS BLD VENIPUNCTURE: CPT

## 2022-07-23 PROCEDURE — 2500000003 HC RX 250 WO HCPCS: Performed by: INTERNAL MEDICINE

## 2022-07-23 RX ORDER — POTASSIUM CHLORIDE 20 MEQ/1
20 TABLET, EXTENDED RELEASE ORAL
COMMUNITY

## 2022-07-23 RX ORDER — MECLIZINE HCL 12.5 MG/1
12.5 TABLET ORAL 4 TIMES DAILY PRN
Status: ON HOLD | COMMUNITY
End: 2022-08-17 | Stop reason: HOSPADM

## 2022-07-23 RX ORDER — PANTOPRAZOLE SODIUM 40 MG/1
40 TABLET, DELAYED RELEASE ORAL
Status: DISCONTINUED | OUTPATIENT
Start: 2022-07-24 | End: 2022-07-29 | Stop reason: HOSPADM

## 2022-07-23 RX ADMIN — RIVAROXABAN 15 MG: 15 TABLET, FILM COATED ORAL at 17:22

## 2022-07-23 RX ADMIN — LEVOTHYROXINE SODIUM 88 MCG: 88 TABLET ORAL at 09:25

## 2022-07-23 RX ADMIN — Medication 10 ML: at 20:00

## 2022-07-23 RX ADMIN — PIPERACILLIN AND TAZOBACTAM 3375 MG: 3; .375 INJECTION, POWDER, LYOPHILIZED, FOR SOLUTION INTRAVENOUS at 04:39

## 2022-07-23 RX ADMIN — Medication 10 ML: at 09:37

## 2022-07-23 RX ADMIN — MICONAZOLE NITRATE: 2 POWDER TOPICAL at 09:37

## 2022-07-23 RX ADMIN — PIPERACILLIN AND TAZOBACTAM 3375 MG: 3; .375 INJECTION, POWDER, LYOPHILIZED, FOR SOLUTION INTRAVENOUS at 20:24

## 2022-07-23 RX ADMIN — CITALOPRAM HYDROBROMIDE 20 MG: 10 TABLET ORAL at 09:25

## 2022-07-23 RX ADMIN — PIPERACILLIN AND TAZOBACTAM 3375 MG: 3; .375 INJECTION, POWDER, LYOPHILIZED, FOR SOLUTION INTRAVENOUS at 12:23

## 2022-07-23 RX ADMIN — METOPROLOL TARTRATE 5 MG: 5 INJECTION INTRAVENOUS at 12:43

## 2022-07-23 RX ADMIN — FERROUS SULFATE TAB 325 MG (65 MG ELEMENTAL FE) 325 MG: 325 (65 FE) TAB at 09:25

## 2022-07-23 NOTE — PLAN OF CARE
Problem: Discharge Planning  Goal: Discharge to home or other facility with appropriate resources  7/23/2022 0953 by Ivette Soriano RN  Outcome: Progressing  7/23/2022 0044 by Filemon Ferraro. Aubrie Ceja RN  Outcome: Progressing  7/22/2022 2012 by Blanca Mendez RN  Outcome: Progressing  Flowsheets  Taken 7/22/2022 2007  Discharge to home or other facility with appropriate resources: Identify barriers to discharge with patient and caregiver  Taken 7/22/2022 2003  Discharge to home or other facility with appropriate resources:   Identify barriers to discharge with patient and caregiver   Identify discharge learning needs (meds, wound care, etc)     Problem: Safety - Adult  Goal: Free from fall injury  7/23/2022 0953 by Ivette Soriano RN  Outcome: Progressing  7/23/2022 0044 by Filemon Ferraro. Ramona Robertson RN  Outcome: Progressing  7/22/2022 2012 by Blanca Mendez RN  Outcome: Progressing     Problem: ABCDS Injury Assessment  Goal: Absence of physical injury  7/23/2022 0953 by Ivette Soriano RN  Outcome: Progressing  7/23/2022 0044 by Filemon Ferraro. Ramona Robertson RN  Outcome: Progressing  7/22/2022 2012 by Blanca Mendez RN  Outcome: Progressing  Flowsheets (Taken 7/22/2022 1959)  Absence of Physical Injury: Implement safety measures based on patient assessment     Problem: Skin/Tissue Integrity  Goal: Absence of new skin breakdown  Description: 1. Monitor for areas of redness and/or skin breakdown  2. Assess vascular access sites hourly  3. Every 4-6 hours minimum:  Change oxygen saturation probe site  4. Every 4-6 hours:  If on nasal continuous positive airway pressure, respiratory therapy assess nares and determine need for appliance change or resting period.   Outcome: Progressing

## 2022-07-23 NOTE — PROGRESS NOTES
Pt was a new admit on rehab on 7/22/22. Assumed care of pt at [facility]. Pt speaks [place] and does not use  video computer. Pt does nod yes and can understand motions and seems to understand some things. Pt appears comfortable. Dressing to right mid abdomen dry and intact over incision. Drain intact to right abdomen with serosanquinous / red drainage noted. Pt has an iv in both hands. Zosyn infusing in right IV over 4 hours tonight. PT is on 3 liters of 02 per nasal cannula and pt uses 3 liters of 02 at home. HR at 2000 was 80 and pt did not need IV lopressor because HR was under 100. Son needs to sign consents and go over home meds with RN on 7/23/22. Telemetry shows afib rate 80. No edema noted to arms or legs. Pt wears brief. Pt is dry at this time. Cooperative. Call light in reach and bed alarm on Electronically signed by Lauren Wiley.  Alexandro Herrera on 7/23/2022 at 12:39 AM

## 2022-07-23 NOTE — PROGRESS NOTES
At [address] telemetry monitor tech called and stated that Pt's HR went up to 150's and then went back down to 107. Pt in no distress. Sleeping at this time. Respirations deep and even. Electronically signed by Matthew Longoria.  Steve Esteban on 7/23/2022 at 2:23 AM

## 2022-07-23 NOTE — PROGRESS NOTES
[facility]   Facility/Department: [facility]  Speech Language Pathology  Clinical Bedside Swallow Evaluation    NAME:Darryl Rodriguez  : 1932 (80 y.o.)   [x]   confirmed    MRN: [id number]  ROOM: [place] [address]  ADMISSION DATE: 2022  PATIENT DIAGNOSIS(ES): neuropathy   No chief complaint on file.     Patient Active Problem List    Diagnosis Date Noted    A-fib [facility])     Impaired mobility and activities of daily living due to CHF ne[place]iasac, [facility] re-admit 2021     Abdominal mass 2022    Right lower quadrant abdominal pain 2022    Hyponatremia 2022    Hypokalemia 2022    Anemia 2022    CHF (congestive heart failure) ([address] 2022    Hypothyroid 2022    Central retinal vein occlusion, left eye, stable 2021    Lumbar stenosis with neurogenic claudication 2016    Chronic diastolic CHF (congestive heart failure) ([address] 2022    Acute cystitis with hematuria 2021    Impaired mobility 2021    Acute on chronic combined systolic (congestive) and diastolic (congestive) heart failure ([address] 2021    Pleural effusion 2021    Hypoxia     Acute pulmonary edema (HCC)     Gait abnormality 2021    Acute on chronic combined systolic and diastolic CHF (congestive heart failure) ([address] 2021    Essential hypertension 2018    Shortness of breath     Dizziness     Ascending aortic aneurysm ([address] 2017    Descending aortic aneurysm ([address] 2017    Osteoarthritis     Myelopathy ([address]     Headache     Depression     Acquired scoliosis 2016    Osteoporosis 2016    Charcot Arleen Tooth muscular atrophy 2016    Excess weight 2016    Osteoarthritis of lumbar spine with myelopathy 2016     Past Medical History:   Diagnosis Date    Ascending aortic aneurysm ([address] 2017    Charcot Arleen Tooth muscular atrophy     CHF (congestive heart failure) (HCC)     Chronic diastolic CHF (congestive heart failure) ([facility] 4/1/2022    Depression     Descending aortic aneurysm ([facility] 6/23/2017    Essential hypertension 2/16/2018    Headache     HTN (hypertension)     Impaired mobility and activities of daily living     Lumbar stenosis with neurogenic claudication     Myelopathy ([facility]     Osteoarthritis      Past Surgical History:   Procedure Laterality Date    JOINT REPLACEMENT Bilateral     knees    OTHER SURGICAL HISTORY Right 07/21/2022    cat scan guided abdominal mass aspiration with drain placement by Dr. Natalie Parker Left 02/25/2021    LEFT PLEURAL CATHETER INSERTION performed by Stephania Wolfe MD at [facility] Left 01/29/2021    total of 755 cc removed per Dr Carloz Garg specimen sent to lab     Allergies   Allergen Reactions    Latex     Tape [employer]        Date of Onset: 7/18/2022  Rehab Diagnosis:      Date of Evaluation: 7/23/2022   Evaluating Therapist: Jennifer Ochoa SLP    Dysphagia Diagnosis  Dysphagia Diagnosis: Swallow function appears [facility]  Dysphagia Impression : Oropharyngeal phase of swallow WFL. Pt was able to form and clear a cohesive bolus with min lingual residuals post swallow. Pt independently implemented lingual sweep and use of liquid wash to help clear bolus. No overt s/s of aspiration observed following any of the presented consistencies. Hyolaryngeal movement was present during the evaluation, noted via observation. Recommended Diet  Recommendations: Self feed  Diet Solids Recommendation: Regular  Liquid Consistency Recommendation: Thin  Recommended Form of Meds: PO  Compensatory Swallowing Strategies :  Alternate solids and liquids;Eat/Feed slowly;Upright as possible for all oral intake;Small bites/sips      Reason for Referral  Talya Fontana was referred for a bedside swallow evaluation to assess the efficiency of her swallow function, identify signs and symptoms of aspiration, identify risk factors, and make recommendations regarding safe dietary consistencies, effective compensatory strategies, and safe eating environment. General  Chart Reviewed: Yes  Comments: Daughter at bedside who reports iPad  has never worked for pt. Subjective  Subjective: Pt was alert, cooperative, and pleasant for BSE. [place] concerns, however, pt refuses to wear hearing aids. Pt speaks Bulgarian, however, unable to hear  via [address]. Behavior/Cognition: Alert; Cooperative;Pleasant mood  Respiratory Status: O2 via nasual cannula  O2 Device: Nasal cannula  Follows Directions: Simple  Dentition: Adequate; Some missing teeth  Patient Positioning: Upright in bed  Baseline Vocal Quality: Normal  Prior Dysphagia History: None  Consistencies Administered: Regular;Pureed; Thin - straw    Vision and Hearing  Vision  Vision: Impaired  Vision Exceptions:  (Needs glasses, but will not wear them per daughter)  Hearing  Hearing: Exceptions to [facility]  Hearing Exceptions: Hard of hearing/hearing concerns; No hearing aid    Current Diet level  Current Diet : Regular  Current Liquid Diet : Thin    Oral Motor  Labial: No impairment  Dentition: Intact; Natural  Oral Hygiene: Clean;Moist  Lingual: No impairment  Velum: No Impairment  Mandible: No impairment  Gag: No Impairment    Oral/Pharyngeal Phase  Oral Phase - Comment: Oral phase of swallow WFL  Pharyngeal Phase: Pharyngeal phase of swallow WFL    PO Trials  Neuromuscular Estim Used: No  Assessment Method(s): Observation  Patient Position: Upright in bed  Vocal Quality: No Impairment  Consistency Presented:  All consistencies  How Presented: Self-fed/presented  Bolus Acceptance: No impairment  Bolus Formation/Control: No impairment  Propulsion: No impairment  Oral Residue: Less than 10% of bolus  Initiation of Swallow: No impairment  Laryngeal Elevation: Functional  Aspiration Signs/Symptoms: None  Pharyngeal Phase Characteristics: No impairment, issues, or problems              Dysphagia Diagnosis  Dysphagia Diagnosis: Swallow function appears WFL  Dysphagia Impression : Oropharyngeal phase of swallow WFL. Pt was able to form and clear a cohesive bolus with min lingual residuals post swallow. Pt independently implemented lingual sweep and use of liquid wash to help clear bolus. No overt s/s of aspiration observed following any of the presented consistencies. Hyolaryngeal movement was present during the evaluation, noted via observation. Dysphagia Outcome Severity Scale: Level 6: Within functional limits/Modified independence     Recommendations  Requires SLP Intervention: No  Recommendations: Self feed  D/C Recommendations: To be determined  Diet Solids Recommendation: Regular  Liquid Consistency Recommendation: Thin  Compensatory Swallowing Strategies : Alternate solids and liquids;Eat/Feed slowly;Upright as possible for all oral intake;Small bites/sips  Recommended Form of Meds: PO  Duration of Treatment: No f/u  Frequency of Treatment: No f/u    Education  Individuals consulted  Consulted and agree with results and recommendations: Patient;RN  RN Name: Juanis Rolle    [x]  Juanis Rolle - RN notified     Treatment/Goals   N/A    Safety Devices  Safety Devices  Safety Devices in place: Yes  Type of devices: Bed alarm in place;Call light within reach; Other (comment) (Family at bedside)    Pain Assessment  Patient does not c/o pain. Pain Re-assessment  Patient does not c/o pain.     Dysphagia Outcomes Severity Scale  Dysphagia Outcome Severity Scale: Level 6: Within functional limits/Modified independence    Therapy Time  SLP Individual Minutes  Time In: 6466  Time Out: [address] [facility]  Minutes: 10        Signature: Electronically signed by SOBIA Thomas on 7/23/2022 at 11:35 AM

## 2022-07-23 NOTE — PLAN OF CARE
Problem: Discharge Planning  Goal: Discharge to home or other facility with appropriate resources  7/23/2022 0044 by Sallie Ledbetter RN  Outcome: Progressing  7/22/2022 2012 by Orly Villarreal, RN  Outcome: Progressing  Flowsheets  Taken 7/22/2022 2007  Discharge to home or other facility with appropriate resources: Identify barriers to discharge with patient and caregiver  Taken 7/22/2022 2003  Discharge to home or other facility with appropriate resources:   Identify barriers to discharge with patient and caregiver   Identify discharge learning needs (meds, wound care, etc)     Problem: Safety - Adult  Goal: Free from fall injury  7/23/2022 0044 by Sallie Ledbetter RN  Outcome: Progressing  7/22/2022 2012 by Orly Villarreal, RN  Outcome: Progressing     Problem: ABCDS Injury Assessment  Goal: Absence of physical injury  7/23/2022 0044 by Sallie Ledbetter RN  Outcome: Progressing  7/22/2022 2012 by Orly Villarreal, RN  Outcome: Progressing  Flowsheets (Taken 7/22/2022 1959)  Absence of Physical Injury: Implement safety measures based on patient assessment

## 2022-07-23 NOTE — PROGRESS NOTES
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)     Impaired mobility and activities of daily living due to CHF exac, [facility] re-admit 2021     Abdominal mass 2022    Right lower quadrant abdominal pain 2022    Hyponatremia 2022    Hypokalemia 2022    Anemia 2022    CHF (congestive heart failure) ([address] 2022    Hypothyroid 2022    Central retinal vein occlusion, left eye, stable 2021    Lumbar stenosis with neurogenic claudication 2016    Chronic diastolic CHF (congestive heart failure) ([address] 2022    Acute cystitis with hematuria 2021    Impaired mobility 2021    Acute on chronic combined systolic (congestive) and diastolic (congestive) heart failure ([address] 2021    Pleural effusion 2021    Hypoxia     Acute pulmonary edema (HCC)     Gait abnormality 2021    Acute on chronic combined systolic and diastolic CHF (congestive heart failure) ([address] 2021    Essential hypertension 2018    Shortness of breath     Dizziness     Ascending aortic aneurysm ([address] 2017    Descending aortic aneurysm ([address] 2017    Osteoarthritis     Myelopathy ([address]     Headache     Depression     Acquired scoliosis 2016    Osteoporosis 2016    Charcot Arleen Tooth muscular atrophy 2016    Excess weight 2016    Osteoarthritis of lumbar spine with myelopathy 2016     Past Medical History:   Diagnosis Date    Ascending aortic aneurysm ([address] 2017    Charcot Arleen Tooth muscular atrophy     CHF (congestive heart failure) (HCC)     Chronic diastolic CHF (congestive heart failure) ([address] 2022 Depression     Descending aortic aneurysm ([facility] 2017    Essential hypertension 2018    Headache     HTN (hypertension)     Impaired mobility and activities of daily living     Lumbar stenosis with neurogenic claudication     Myelopathy ([facility]     Osteoarthritis      Past Surgical History:   Procedure Laterality Date    JOINT REPLACEMENT Bilateral     knees    OTHER SURGICAL HISTORY Right 2022    cat scan guided abdominal mass aspiration with drain placement by Dr. 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    Pain   Pre treatment screenin/10   Post treatment screening 0/10    Prior Level of Function:  Social/Functional History  Lives With: Alone  Type of Home: House  Home Layout: One level  Home Access: Stairs to enter with rails  Entrance Stairs - Number of Steps: 2  Entrance Stairs - Rails: Both  Bathroom Shower/Tub: Tub/Shower unit  Bathroom Equipment: Shower chair, Grab bars in shower  Home Equipment: Cane  ADL Assistance: [facility] [address]: Independent  Homemaking Responsibilities: Yes  Meal Prep Responsibility: Primary  Laundry Responsibility: Primary  Cleaning Responsibility: Primary  Shopping Responsibility: No (Son performs)  Ambulation Assistance: Independent (furniture walking and cane prn)  Transfer Assistance: Independent  Active : No  Additional Comments: Social-functional information collected through chart review and dtr able to confirm    OBJECTIVE:   Vision/Hearing:  Vision  Vision: Within Functional Limits  Hearing: Exceptions to [facility]  Hearing Exceptions: Hard of hearing/hearing concerns    Cognition/Observation:  Overall Orientation Status: Within Functional Limits     ROM:  AROM: Generally decreased, functional  PROM: Generally decreased, functional    Strength:  Strength: Generally decreased, functional (functionally >/=3+/5)    Neuro:  Balance  Sitting - Static: Good;-  Sitting - Dynamic: Good;-  Standing - Static: Fair;- (LOB requires mod A to correct despite use of standard walker)  Standing - Dynamic: Fair;-                       Bed mobility  Rolling to Left: Minimal assistance  Rolling to Right: Minimal assistance  Supine to Sit: Moderate assistance  Sit to Supine: Moderate assistance  Bed Mobility Comments: several rolling trials for hygiene d/t bowel/bladder incontinence, HOB flat. Encouraged log roll with tactile cues. Increased time and effort, dizziness reported upon initial sitting posture which resolves quickly.     Transfers  Sit to Stand: Minimal Assistance  Stand to sit: Minimal Assistance  Bed to Chair: Minimal assistance  Stand Pivot Transfers: Minimal Assistance (with 2ww)  Car Transfer: Unable to assess  Comment: decreased safety with hand placement despite VC and TC    Ambulation  Surface: level tile  Device: Standard Walker (limited by equipment in the room)  Other Apparatus: O2 (3L)  Assistance: Minimal assistance  Gait Deviations: Slow Lea;Decreased step length;Decreased step height;Decreased head and trunk rotation  Distance: 3ft sidesteps and turning step  Comments: assist with balance reactions and walker management, appears SOB- per pt and family this appears near baseline but pt subjectively reports weakness       Activity Tolerance  Activity Tolerance: Patient tolerated evaluation without incident    Patient Education  Education Given To: Patient  Education Provided: Plan of Care;Role of Therapy;Precautions  Education Provided Comments: log roll  Education Method: Verbal  Barriers to Learning: None  Education Outcome: Continued education needed    Quality Indicators (IRF-CHAI):  Rolling L and R: Partial/Moderate Assistance (helper does <50%) - 3  Sit>Supine: Partial/Moderate Assistance (helper does <50%) - 3  Supine>Sit: Partial/Moderate Assistance (helper does <50%) - 3  Sit>Stand: Partial/Moderate Assistance (helper does <50%) - 3  Chair/Bed>Chair Transfer: Partial/Moderate Assistance (helper does <50%) - 3  Car Transfers: Not attempted due to Medical Condition or Safety Concerns (I.e. unsafe or physician orders) - 80  Walk 10 ft: Not attempted due to Medical Condition or Safety Concerns (I.e. unsafe or physician orders) - 80  Walk 50 ft with two 90 degree turns: Not attempted due to Medical Condition or Safety Concerns (I.e. unsafe or physician orders) - 80  Walk 150 ft in [address]: Not Applicable (pt did not complete item prior to admission) - 9  Walking 10 ft on Unlevel Surface: Not attempted due to Medical Condition or Safety Concerns (I.e. unsafe or physician orders) - 80  Picking up Objects from Standing Position: Not attempted due to Medical Condition or Safety Concerns (I.e. unsafe or physician orders) - 80  Stairs: No Not attempted due to medical condition or safety concerns (i.e. unsafe or physician order) - 88  WC Mobility: No Not Applicable (pt did not complete item prior to admission) - 9    ASSESSMENT:  Body Structures, Functions, Activity Limitations Requiring Skilled Therapeutic Intervention: Decreased functional mobility ; Decreased strength;Decreased posture;Decreased balance;Decreased endurance  Decision Making: Medium Complexity  History: high  Exam: high  Clinical Presentation: med    Specific Instructions for Next Treatment: in room d/t diarrhea  Therapy Prognosis: Good  Barriers to Learning: language barrier, heard of hearing       CLINICAL IMPRESSION: Pt demonstrates the above deficits and decline in functional mobility status placing them at increased risk for falls. Pt would benefit from physical therapy to address above deficits and allow for safe return home at highest level of function, decrease risk for falls, and improve QOL.     PLAN OF CARE:  Frequency: 1-2 treatment sessions per day, 5-7 days per week  Specific Instructions for Next Treatment: in room d/t diarrhea  Current Treatment Recommendations: Strengthening, ROM, Balance training, Functional mobility training, Transfer training, Endurance training, Gait training, Stair training, Cognitive reorientation, Patient/Caregiver education & training, Safety education & training, Equipment evaluation, education, & procurement, Therapeutic activities, Home exercise program, Neuromuscular re-education    Patient's Goal:  does not state, family wishes pt to regain her indep    GOALS:  Patient goals : does not state, family wishes pt to regain her indep  Long Term Goals  Long term goal 1: Bed mobility with indep  Long term goal 2: Functional transfers with indep  Long term goal 3: Amb 50ft with LRAD and indep  Long term goal 4: 2 steps with handrail with supervision to enter/exit home  Long term goal 5: 5xSTS <18 seconds to demonstrate decreased fall risk    ELOS:   Plan weeks: 3 weeks    Therapy Time:    Individual   Time In 1100   Time Out 1130   Minutes Winsome Gray [place], 07/23/22 at 12:27 PM

## 2022-07-23 NOTE — PLAN OF CARE
Problem: Discharge Planning  Goal: Discharge to home or other facility with appropriate resources  Outcome: Progressing  Flowsheets  Taken 7/22/2022 2007  Discharge to home or other facility with appropriate resources: Identify barriers to discharge with patient and caregiver  Taken 7/22/2022 2003  Discharge to home or other facility with appropriate resources:   Identify barriers to discharge with patient and caregiver   Identify discharge learning needs (meds, wound care, etc)     Problem: Safety - Adult  Goal: Free from fall injury  Outcome: Progressing     Problem: ABCDS Injury Assessment  Goal: Absence of physical injury  Outcome: Progressing  Flowsheets (Taken 7/22/2022 1959)  Absence of Physical Injury: Implement safety measures based on patient assessment

## 2022-07-23 NOTE — PROGRESS NOTES
Pt given 5 mg lopressor slow IV push per PRN order over 5 minutes for HR of 119 per monitor tech. Pt alert and oriented, HR post administration 94. Daughter at bedside. Agree with LPN assessment.

## 2022-07-23 NOTE — PROGRESS NOTES
Pt put call light in and wanted her brief changed. PT was incontinent of urine. Buttocks was reddened. Magdalene cream applied. Pt denied any complaints of pain. Electronically signed by Jimmie Adkins on 7/23/2022 at 3:08 AM

## 2022-07-23 NOTE — PROGRESS NOTES
OCCUPATIONAL THERAPY  INPATIENT REHAB TREATMENT NOTE  [facility]      NAME: Collin Guzman  : 1932 ([address]  [address]  MRN: [id number]  CODE STATUS: Full Code  [facility]    Date of Service: 2022    Referring Physician: Dr. Juanito Vigil Diagnosis: Impaired mobility and ADL's due to Charcot Louetta [place] Tooth Neuropathy    Restrictions  Restrictions/Precautions  Restrictions/Precautions: Fall Risk         Position Activity Restriction  Other position/activity restrictions: Abdominal drain    Patient's date of birth confirmed: Yes    SAFETY:  Safety Devices  Safety Devices in place: Yes  Type of devices: All fall risk precautions in place    SUBJECTIVE:    Pain at start of treatment: No    Pain at end of treatment: No  Pt denies pain reporting only \"weakness\". OBJECTIVE: Pt sitting up in [address] upon therapist's arrival to her room. Pt emotional and requesting to return to bed. Bed To/From Chair  Technique: Stand pivot  Assistance Level: Minimal assistance  Skilled Clinical Factors: WC > EOB    Bed Mobility  Sit to Supine: Moderate Assistance (Assistance to bring BLEs into bed)    Attempted to engage pt in therapeutic activity at bed level. Pt becomes emotional stating \"no more\". Encouraged pt to participate, however, she continued to refuse. Education:  Education  Education Given To: Patient  Education Provided: Transfer Training  Education Method: Demonstration;Verbal  Barriers to Learning: Cognition  Education Outcome: Verbalized understanding;Demonstrated understanding;Continued education needed      ASSESSMENT:  Activity Tolerance: Patient limited by fatigue;Patient limited by endurance      PLAN OF CARE:  Patient goals : \"To be able to stand up and move around on my own. \"  Long Term Goal 1: Pt will increased BUE stength by 1/2 MMT grade. Long Term Goal 2: Pt will be IND with ECWS techniques. Long Term Goal 3: Pt will be Mod IND with LB dressing.   Long Term Goal 4: Pt will be Mod IND for functional mobility. Therapy Time:   Individual Group Co-Treat   Time In 1430       Time Out 1445         Minutes 15       Pt refused to finish session reporting significant fatigue.      ADL/IADL training: 15 minutes     Electronically signed by:    PRUDENCE Jarquin,   7/23/2022, 3:10 PM

## 2022-07-23 NOTE — PROGRESS NOTES
Assessment  Assessment: Improved tolerance pm session, patient with improved sit to stand technique. Patient completed gait training with  follow for safety as she fatigues quickly.     Goals:  Long Term Goals  Long term goal 1: Bed mobility with indep  Long term goal 2: Functional transfers with indep  Long term goal 3: Amb 50ft with LRAD and indep  Long term goal 4: 2 steps with handrail with supervision to enter/exit home  Long term goal 5: 5xSTS <18 seconds to demonstrate decreased fall risk  Patient Goals   Patient goals : does not state, family wishes pt to regain her indep    PLAN OF CARE/Safety:    All precautions in place      Therapy Time:   Individual   Time In 1330   Time Out 1400   Minutes 30     Minutes:  Transfer/Bed mobility training:10  Gait trainin  Neuro re education:0  Therapeutic ex:0      Lorie Calderón PTA, 22 at 2:31 PM

## 2022-07-23 NOTE — PROGRESS NOTES
Physical Therapy Rehab Treatment Note  Facility/Department: [facility]  Room: [place] [address]       NAME: Kaylie Shoemaker  : 1932 (80 y.o.)  MRN: [id number]  CODE STATUS: Full Code    Date of Service: 2022       Restrictions:  Restrictions/Precautions: Fall Risk (high per negro score)  Position Activity Restriction  Other position/activity restrictions: Abdominal drain       SUBJECTIVE:   Subjective: patient accompanied by daughter Alyssa Frankel    Pain  Pain: 7/10 abdominal pain, rn becca, napoleon given      OBJECTIVE:        Transfers  Surface: To chair without arms  Additional Factors: Hand placement cues  Device: Walker  Sit to Stand  Assistance Level: Contact guard assist  Skilled Clinical Factors: cues for hand placement  Stand to Sit  Assistance Level: Contact guard assist;Minimal assistance         PT Exercises  Resistive Exercises: mre hip flexion, abd, hamstring x10x2   Hamstring stretch 30 seconds x3 clayton  Seated march, laq x10x2  HR x20 seated       ASSESSMENT/PROGRESS TOWARDS GOALS:   Assessment  Assessment: Patient with poor tolerance to tf, states getting dizzy and feels shaky.  /58, patient felt better after seated rest.    Goals:  Long Term Goals  Long term goal 1: Bed mobility with indep  Long term goal 2: Functional transfers with indep  Long term goal 3: Amb 50ft with LRAD and indep  Long term goal 4: 2 steps with handrail with supervision to enter/exit home  Long term goal 5: 5xSTS <18 seconds to demonstrate decreased fall risk  Patient Goals   Patient goals : does not state, family wishes pt to regain her indep    PLAN OF CARE/Safety:    All precautions in place       Therapy Time:   Individual   Time In 1130   Time Out 1200   Minutes 30     Minutes:30  Transfer/Bed mobility trainin  Gait trainin  Neuro re education:0  Therapeutic ex:25      Miar Carlos PTA, 22 at 12:44 PM

## 2022-07-23 NOTE — PLAN OF CARE
Problem: Discharge Planning  Goal: Discharge to home or other facility with appropriate resources  7/23/2022 1602 by Kathe Mcdaniels RN  Outcome: Progressing  7/23/2022 0953 by Twin Elias RN  Outcome: Progressing     Problem: Safety - Adult  Goal: Free from fall injury  7/23/2022 1602 by Kathe Mcdaniels RN  Outcome: Progressing  7/23/2022 0953 by Twin Elias RN  Outcome: Progressing     Problem: ABCDS Injury Assessment  Goal: Absence of physical injury  7/23/2022 1602 by Kathe Mcdaniels RN  Outcome: Progressing  7/23/2022 0953 by Twin Elias RN  Outcome: Progressing     Problem: Skin/Tissue Integrity  Goal: Absence of new skin breakdown  Description: 1. Monitor for areas of redness and/or skin breakdown  2. Assess vascular access sites hourly  3. Every 4-6 hours minimum:  Change oxygen saturation probe site  4. Every 4-6 hours:  If on nasal continuous positive airway pressure, respiratory therapy assess nares and determine need for appliance change or resting period.   7/23/2022 1602 by Kathe Mcdaniels RN  Outcome: Progressing  7/23/2022 0953 by Twin Elias RN  Outcome: Progressing

## 2022-07-23 NOTE — H&P
HISTORY & PHYSICAL       DATE OF ADMISSION:  7/22/2022    DATE OF SERVICE:  7/23/22    Subjective:    Paulette Rodriguez, 80 y.o. female presents today with:     CHIEF COMPLAINT:        HPI    I reviewed recent nursing note, \" see notes\". Oh Rios is a 80 y.o. female admitted to [facility] on 7/18/2022. Patient was initially admitted through the ER at [facility] complaining of abdominal pain for the past 5 to 7 days. She also complained of headache and dizziness. Imaging revealed an abdominal mass felt to be either cancer or an abscess. Imaging was thinking it was more like an abscess. She was admitted to [facility] placed on antibiotics and a drain was placed under interventional radiology's guidance. The patient has stabilized medically and is able to participate at acute level rehab but is too medically complex for SNF due to need for therapy at the acute level with at least 15 hours a week of PT OT and cognitive and recreational therapy at an acute level with daily medical monitoring. Imaging:  Imaging and other studies reviewed and discussed with patient and staff    CT ABDOMEN PELVIS WO CONTRAST Additional Contrast? Oral    Result Date: 7/18/2022  CT of the Abdomen and Pelvis without intravenous contrast medium History:  Approximate 1 week history of abdominal pain Technical Factors: CT imaging of the abdomen and pelvis were obtained and formatted as 5 mm contiguous axial images from the domes of the diaphragm to the symphysis pubis. Sagittal and coronal reconstructions were also obtained. Oral contrast medium: Barium sulfate, 450 mL. Intravenous contrast medium:  None. Comparison:  CT abdomen pelvis, July 18, 2022, 1444 hours, February 8, 2021. Findings: Residual intravenous contrast medium from recent CT examination identified. Lungs:  Lung bases clear. Cardiac size enlarged, unchanged. Calcification at level of mitral valve.  Small hiatal hernia. Liver:  Normal in size, shape, and attenuation. Bile Ducts:  Normal in caliber. Gallbladder:  No stones or wall thickening. Pancreas:  Normal without masses, cysts, ductal dilatation or calcification. Spleen:  Normal in size without masses or calcifications. No splenules. Kidneys:  Normal in size. No hydronephrosis, masses, or stones. Adrenals:  Normal. Small bowel:  Normal in caliber. See \"peritoneum \". Appendix:  Not visualized. Colon:  Normal in caliber. See \"peritoneum \". Peritoneum:  No free air. A bilobed, 10.0 x 8.0 x 10.6 cm mass having central rounded septated areas of low attenuation, 3.7 x 3.5 x 5.4 cm, and displacing cecum and ascending colon anteriorly (series 2, image 50, series 601, image 54, series 602, image 36). No calcification identified. Trace adjacent fat stranding demonstrated. Vessels: Aorta normal in course and caliber. Lymph nodes:  Retroperitoneal:  No enlarged retroperitoneal lymph nodes. Mesenteric:  No enlarged mesenteric lymph nodes. Pelvic: No enlarged pelvic lymph nodes. Ureters: Normal in course and caliber. No calcifications. Bladder: No wall thickening. Reproductive organs: 8.0 x 5.8 x 7.5 cm left adnexal cyst displacing urinary bladder right and laterally, anteriorly, and inferiorly (series 2, image 68, series 601, image 42, series 602, image 52). Abdominal Wall: Fat identified bilateral inguinal canals. Bones:  No bone lesions. Multilevel disc space narrowing lumbar spine. No post operative changes. Complex 10.0 x 8.0 x 10.6 cm mass with septated central low attenuation fluid collections as discussed. Mass displaces cecum and ascending colon anteriorly. Differential includes malignancy including appendiceal mucocele/carcinoma, as well as retroperitoneal sarcoma with central necrotic change. Infection/abscess secondary consideration, minimal presence of adjacent inflammatory change.  8.0 x 5.8 x 7.5 cm left adnexal cysts as discussed, most likely ovarian in etiology. If clinical concern warrants, pelvic sonography may be obtained for further evaluation. Other findings discussed. All CT scans at this facility use dose modulation, iterative reconstruction, and/or weight based dosing when appropriate to reduce radiation dose to as low as reasonably achievable. CT Head WO Contrast    Result Date: 7/18/2022  CT OF THE BRAIN WITHOUT CONTRAST HISTORY: Khari Benavides is a Female of 80 years age with history of headache and dizziness. COMPARISON: July 19, 2017 TECHNIQUE:  Spiral CT examination of the brain was performed without intravenous contrast.  Images were obtained from the base of the skull up to the convexities in axial slices, and then examined in the blood, brain parenchymal and bony windows. FINDINGS:  No acute changes are noted. There is no evidence for mass, mass effect or midline shift. No abnormal extra-axial fluid collections are appreciated. Age-appropriate cortical volume loss is seen. There are patchy areas of hypoattenuation in a sub-cortical, central white and periventricular distribution consistent with nonspecific ischemic small vessel disease. Calcifications are again seen in the basal ganglia. There are no acute parenchymal alterations, blood byproducts or abnormal extracerebral fluid. The calvarium is grossly intact. The visualized paranasal sinuses show mucoceles or polyps in the maxillary sinuses. Mild fluid is seen in the mastoid air cells that is not significantly changed and is consistent with chronic changes. Camilo Miller 1.Age-appropriate cortical atrophy and periventricular microangiopathy. When compared to previous study there has been no significant interval change. 2. No acute hemorrhage or extra-axial fluid collection All CT scans at this facility use dose modulation, iterative reconstruction, and/or weight based dosing when appropriate to reduce radiation dose to as low as reasonably achievable.      CT ABSCESS DRAINAGE W CATH PLACEMENT S&I    1. Successful drainage and drain placement of right colonic soft tissue mass with central fluid collection. 2.Fluid was aspirated and sent for microbiology and cytology analysis. Additionally a 10 Albanian drain was placed yielding a thick red to yellow fluid with internal yellow debris. HISTORY: Kameron Xiong is a Female of 80 years age. DIAGNOSIS:   Right abdominal mass with possible fluid collection vs necrotic tissue COMPARISON: None available. CT Dose-Length Product (estimate related to radiation exposure from this exam):  541.6  mGy*cm. PROCEDURE: Following the discussion of the procedure, alternatives, risks versus benefits, informed consent was obtained from the patient. Specifically, risks of after-biopsy pain at the site, rare possibility of excessive hemorrhage, infection, injury to the adjacent organs were discussed and the patient verbalized understanding. Pre-procedure evaluation confirmed that the patient was an appropriate candidate for conscious sedation. Adequate sedation was maintained during the entire procedure. Vital signs, pulse oximetry, and response to verbal commands were monitored and recorded by the nurse throughout the procedure and the recovery period. Medical information was entered in the medical record including the medications and dosages used. The patient returned to baseline neurologic and physiologic status prior to leaving the department. No immediate sedation related complications were noted. Medication for conscious sedation was administered via IV route. 45 minutes of conscious sedation was provided. Following universal protocol, patient and site verification was performed with a \"timeout\" prior to the procedure. The patient was placed on the CT table in supine  position and the right lateral abdomen area was prepped and draped in usual sterile fashion.   Using the usual sterile conditions, lidocaine and CT guidance, the fluid collection within soft tissue density  was accessed using a Yueh needle. After confirmation of appropriate localization of the needle, aspiration yielded a thick red to yellow fluid which was sent for microbiology and cytology analysis. Following that an Amplatz wire was placed through the Yueh sheath into the abscess under CT guidance. The tract was serially dilated with 6, 8, and 10 Venora Salmons dilators respectively. Following that a 10 Ukrainian drainage catheter was advanced over the wire into the abscess under CT guidance and the anchoring loop was formed. Catheter was sutured to the skin and and secured with Percufix device. The patient tolerated the procedure well. No immediate complications identified. The patient left the CT suite in supine position to the recovery room in stable condition. Total local anesthetic used: lidocaine, approximately 15 mL. Estimated blood loss:  None. CT GUIDED NEEDLE PLACEMENT    1. Successful drainage and drain placement of right colonic soft tissue mass with central fluid collection. 2.Fluid was aspirated and sent for microbiology and cytology analysis. Additionally a 10 Ukrainian drain was placed yielding a thick red to yellow fluid with internal yellow debris. HISTORY: Elvira Key is a Female of 80 years age. DIAGNOSIS:   Right abdominal mass with possible fluid collection vs necrotic tissue COMPARISON: None available. CT Dose-Length Product (estimate related to radiation exposure from this exam):  541.6  mGy*cm. PROCEDURE: Following the discussion of the procedure, alternatives, risks versus benefits, informed consent was obtained from the patient. Specifically, risks of after-biopsy pain at the site, rare possibility of excessive hemorrhage, infection, injury to the adjacent organs were discussed and the patient verbalized understanding. Pre-procedure evaluation confirmed that the patient was an appropriate candidate for conscious sedation.  Adequate sedation was maintained during the entire procedure. Vital signs, pulse oximetry, and response to verbal commands were monitored and recorded by the nurse throughout the procedure and the recovery period. Medical information was entered in the medical record including the medications and dosages used. The patient returned to baseline neurologic and physiologic status prior to leaving the department. No immediate sedation related complications were noted. Medication for conscious sedation was administered via IV route. 45 minutes of conscious sedation was provided. Following universal protocol, patient and site verification was performed with a \"timeout\" prior to the procedure. The patient was placed on the CT table in supine  position and the right lateral abdomen area was prepped and draped in usual sterile fashion. Using the usual sterile conditions, lidocaine and CT guidance, the fluid collection within soft tissue density  was accessed using a Yueh needle. After confirmation of appropriate localization of the needle, aspiration yielded a thick red to yellow fluid which was sent for microbiology and cytology analysis. Following that an Amplatz wire was placed through the Yueh sheath into the abscess under CT guidance. The tract was serially dilated with 6, 8, and 10 [place] dilators respectively. Following that a 10 Mohawk drainage catheter was advanced over the wire into the abscess under CT guidance and the anchoring loop was formed. Catheter was sutured to the skin and and secured with Percufix device. The patient tolerated the procedure well. No immediate complications identified. The patient left the CT suite in supine position to the recovery room in stable condition. Total local anesthetic used: lidocaine, approximately 15 mL. Estimated blood loss:  None.      CT ABDOMEN PELVIS W IV CONTRAST Additional Contrast? None    Result Date: 7/18/2022  Comparison: February 8, 2021 History: Right lower quadrant abdominal pain  Technique: CT ABDOMEN PELVIS W IV CONTRAST Helically Acquired CT of the  abdomen and pelvis was performed during the intravenous infusion of 100 mL of Isovue-370. Axial delayed images were also performed. Reformatted sagittal and coronal images were also  obtained. Findings: The visualized bases of the lungs shows no consolidating pneumonia or pleural effusion. There is atelectasis seen in the bases. The thoracic aorta and visualized portion is tortuous and mildly dilated at 4 cm in greatest transverse diameter which is stable. . In the right lower quadrant of the abdomen there are multiple bowel loops seen that are peripheral to hypodense collections which could be from fluid or proteinaceous collections such as abscess. This the loops of bowel appear to be a ascending colon and  ileum. The largest walled off collections measure 4.5 x 3.8 x 3.3 cm and 4.4 x 3.4 x 4.2 cm. There is pericolic stranding seen. The left colon is unremarkable. A cystic structure is again seen in the pelvis that measures 6.7 x 8.6 x 9.8 cm. A small amount of free fluid is seen in the posterior pelvis. Bilateral inguinal hernias are seen. The hernia on the left contains fat. The hernia on the right contains a loop of small bowel. No bowel obstruction is seen. The liver is decreased in attenuation and is enlarged. The spleen, pancreas and gallbladder and adrenal glands are unremarkable. Bilaterally both kidneys show uptake of contrast with no evidence for hydronephrosis or renal calculi. Prominent atherosclerotic calcifications are seen. The bladder is partially decompressed. The bladder wall however also appears to be thickened. Degenerative changes are seen in the spine at multiple levels. Intervertebral disc space narrowing is seen at L1 to. Slight anterolisthesis of L4 on L5 is noted. There is vacuum anomaly seen at all levels in the lumbar spine. Impression: 1.  In the right lower quadrant of the abdomen, pericolic abscesses versus pericolic mass is seen. This is thought more likely abscess. 2. A cystic mass is again seen in the pelvis and is likely in the ovary. It is unchanged from previous examination The patient will be given oral contrast and rescanned to evaluate the right lower quadrant.   All CT scans at this facility use dose modulation, iterative reconstruction, and/or weight based dosing            Labs:    Labs reviewed and discussed with patient and staff    Lab Results   Component Value Date/Time    POCGLU 113 02/06/2021 04:16 PM    POCGLU 134 02/06/2021 11:21 AM     Lab Results   Component Value Date/Time     07/23/2022 06:03 AM    K 3.4 07/23/2022 06:03 AM     07/23/2022 06:03 AM    CO2 31 07/23/2022 06:03 AM    BUN 10 07/23/2022 06:03 AM    CREATININE 0.52 07/23/2022 06:03 AM    CALCIUM 8.5 07/23/2022 06:03 AM    LABALBU 2.6 07/23/2022 06:03 AM    BILITOT 0.4 07/23/2022 06:03 AM    ALKPHOS 53 07/23/2022 06:03 AM    AST 20 07/23/2022 06:03 AM    ALT 20 07/23/2022 06:03 AM     Lab Results   Component Value Date/Time    WBC 9.1 07/23/2022 06:03 AM    RBC 3.79 07/23/2022 06:03 AM    HGB 10.6 07/23/2022 06:03 AM    HCT 31.6 07/23/2022 06:03 AM    MCV 83.2 07/23/2022 06:03 AM    MCH 27.8 07/23/2022 06:03 AM    MCHC 33.4 07/23/2022 06:03 AM    RDW 14.3 07/23/2022 06:03 AM     07/23/2022 06:03 AM     Lab Results   Component Value Date/Time    VITD25 56.0 10/02/2020 11:59 AM     Lab Results   Component Value Date/Time    COLORU Yellow 07/18/2022 12:30 PM    NITRU Negative 07/18/2022 12:30 PM    GLUCOSEU Negative 07/18/2022 12:30 PM    KETUA TRACE 07/18/2022 12:30 PM    UROBILINOGEN 0.2 07/18/2022 12:30 PM    BILIRUBINUR Negative 07/18/2022 12:30 PM     Lab Results   Component Value Date/Time    PROTIME 15.0 01/25/2021 05:00 PM     Lab Results   Component Value Date/Time    INR 1.2 01/25/2021 05:00 PM           The patient remains highly medically complex and continues to have severe problems with activities of daily living and mobility. The patient was assessed to be able to tolerate intensive rehabilitation and therefore was admitted to Rehabilitation to address these needs. The patient has been found to have severe abnormality of gait and mobility with impaired self care and is admitted to the acute inpatient rehab program.       Prior Function; everyday activities:     Social History     Socioeconomic History    Marital status:      Spouse name: Not on file    Number of children: Not on file    Years of education: Not on file    Highest education level: Not on file   Occupational History    Not on file   Tobacco Use    Smoking status: Never    Smokeless tobacco: Never   Substance and Sexual Activity    Alcohol use: No     Alcohol/week: 0.0 standard drinks    Drug use: No    Sexual activity: Not on file   Other Topics Concern    Not on file   Social History Narrative    , Lives With: Alone, son lives down the street, dtr is in the area    Type of Home: [address] [place] [address] Court: One level    Home Access: Stairs to enter with rails- Number of Steps: 2- Rails: Both    Bathroom Shower/Tub: Tub/Shower unit, Bathroom Equipment: Grab bars in shower, Shower chair    Home Equipment: Rolling walker, Cane(Pt infrequemtly uses DME for ambulation and prefers to furniture walk in home)    ADL Assistance: Independent, [address]: Independent    Homemaking Responsibilities: Yes    Ambulation Assistance: Independent, Transfer Assistance: Independent    Additional Comments: Son stops over 2 times daily         Social Determinants of Health     Financial Resource Strain: Not on file   Food Insecurity: Not on file   Transportation Needs: Not on file   Physical Activity: Not on file   Stress: Not on file   Social Connections: Not on file   Intimate Partner Violence: Not on file   Housing Stability: Not on file     Social supports listed above.       Prior Device(s) used:  As above    [facility] 6/23/2017    Essential hypertension 2/16/2018    Headache     HTN (hypertension)     Impaired mobility and activities of daily living     Lumbar stenosis with neurogenic claudication     Myelopathy ([facility]     Osteoarthritis        Past Surgical History:   Procedure Laterality Date    JOINT REPLACEMENT Bilateral     knees    OTHER SURGICAL HISTORY Right 07/21/2022    cat scan guided abdominal mass aspiration with drain placement by Dr. Galicia Brine Left 02/25/2021    LEFT PLEURAL CATHETER INSERTION performed by Rain Mendez MD at [place] [id number] Left 01/29/2021    total of 755 cc removed per Dr Weiss [place] specimen sent to lab       Current Facility-Administered Medications   Medication Dose Route Frequency Provider Last Rate Last Admin    sodium chloride flush 0.9 % injection 5-40 mL  5-40 mL IntraVENous 2 times per day Erika Antoine MD   10 mL at 07/23/22 0937    sodium chloride flush 0.9 % injection 5-40 mL  5-40 mL IntraVENous PRN Erika Antoine MD        0.9 % sodium chloride infusion   IntraVENous PRN Erika Antoine MD        ondansetron (ZOFRAN-ODT) disintegrating tablet 4 mg  4 mg Oral Q8H PRN Erika Antoine MD        Or    ondansetron (ZOFRAN) injection 4 mg  4 mg IntraVENous Q6H PRN Erika Antoine MD        polyethylene glycol (GLYCOLAX) packet 17 g  17 g Oral Daily PRN Erika Antoine MD        acetaminophen (TYLENOL) tablet 650 mg  650 mg Oral Q6H PRN Erika Antoine MD        Or    acetaminophen (TYLENOL) suppository 650 mg  650 mg Rectal Q6H PRN Erika Antoine MD        piperacillin-tazobactam (ZOSYN) 3,375 mg in dextrose 5 % 50 mL IVPB extended infusion (mini-bag)  3,375 mg IntraVENous Q8H Erika Antoine MD 12.5 mL/hr at 07/23/22 1223 3,375 mg at 07/23/22 1223    ketorolac (TORADOL) injection 15 mg  15 mg IntraVENous Q6H PRN Erika Antoine MD        citalopram (CELEXA) tablet 20 mg  20 mg Oral Daily Erika Antoine MD   20 mg at 07/23/22 0925    ferrous sulfate (IRON 325) tablet 325 mg  325 mg Oral Every Other Day Briseida Maradiaga MD   325 mg at 07/23/22 6711    levothyroxine (SYNTHROID) tablet 88 mcg  88 mcg Oral Daily Briseida Maradiaga MD   88 mcg at 07/23/22 0925    metoprolol (LOPRESSOR) injection 5 mg  5 mg IntraVENous Q6H Briseida Maradiaga MD   5 mg at 07/23/22 1243    rivaroxaban (XARELTO) tablet 15 mg  15 mg Oral Daily Briseida Maradiaga MD        miconazole (MICOTIN) 2 % powder   Topical BID Katie Tubbs MD   Given at 07/23/22 4202       Allergies   Allergen Reactions    Latex     Tape Donnel Sneddon Tape]                       FAMILY HISTORY:  Does not pertain to chief complaint. Review of Systems       Objective  /69   Pulse (!) 107   Temp 98.6 °F (37 °C) (Oral)   Resp 20   SpO2 99% *    Physical Exam  Ortho Exam  Neurologic Exam  ROS x10: The patient also complains of severely impaired mobility and activities of daily living. Otherwise no new problems with vision, hearing, nose, mouth, throat, dermal, cardiovascular, GI, , pulmonary, musculoskeletal, psychiatric or neurological. See also Acute Rehab PM&R H&P. Vital signs:  /82   Pulse 75   Temp 97.7 °F (36.5 °C) (Oral)   Resp 18   SpO2 97%   I/O:   PO/Intake:  fair PO intake,   reg diet    Bowel:   continent   Bladder: incontinent  no taylor  General:  Patient is well developed,   adequately nourished, and    well kempt. HEENT:    Pupils equal, hearing intact to loud voice, external inspection of ear and nose benign. Inspection of lips, tongue and gums benign    Musculoskeletal: No significant change in strength or tone. All joints stable. Inspection and palpation of digits and nails show no clubbing, cyanosis or inflammatory conditions. Neuro/Psychiatric: Affect: flat but pleasant. Alert and oriented to person, place and situation with    cues. No significant change in deep tendon reflexes or sensation  Lungs:  Diminished, CTA-B.  Respiration effort is   normal at rest. Heart:   S1 = S2,   RRR. Abdomen:  Soft, non-tender, no enlargement of liver or spleen. Extremities:    lower extremity edema    Skin:   Intact to general survey, abdomen tender still     Rehabilitation:  Physical Therapy:   Bed mobility:  Bed mobility  Rolling to Left: Minimal assistance (07/23/22 1142)  Rolling to Right: Minimal assistance (07/23/22 1142)  Supine to Sit: Moderate assistance (07/23/22 1142)  Sit to Supine: Moderate assistance (07/23/22 1142)  Bed Mobility Comments: several rolling trials for hygiene d/t bowel/bladder incontinence, HOB flat. Encouraged log roll with tactile cues. Increased time and effort, dizziness reported upon initial sitting posture which resolves quickly. (07/23/22 1142)  Transfers:  Transfers  Sit to Stand: Minimal Assistance (07/23/22 1221)  Stand to sit: Minimal Assistance (07/23/22 1221)  Bed to Chair: Minimal assistance (07/23/22 1221)  Stand Pivot Transfers: Minimal Assistance (with 2ww) (07/23/22 1221)  Car Transfer: Unable to assess (07/23/22 1221)  Comment: decreased safety with hand placement despite VC and TC (07/23/22 1221)  Transfer Training  Transfer Training: Yes (07/23/22 1413)  Overall Level of Assistance: Contact-guard assistance;Minimum assistance (07/23/22 1413)  Interventions: Tactile cues (07/23/22 1413)  Sit to Stand: Minimum assistance;Contact-guard assistance (07/23/22 1413)  Stand to Sit: Minimum assistance;Contact-guard assistance (07/23/22 1413)  Transfers  Surface:  To chair without arms (07/23/22 1145)  Additional Factors: Hand placement cues (07/23/22 1145)  Device: [place] (07/23/22 1145)  Sit to Stand  Assistance Level: Contact guard assist (07/23/22 1145)  Skilled Clinical Factors: cues for hand placement (07/23/22 1145)  Stand to Sit  Assistance Level: Contact guard assist;Minimal assistance (07/23/22 1145)  Gait:   Ambulation  WB Status: wbat (07/23/22 1354)  Ambulation  Surface: level tile (07/23/22 1354)  Device: Luverne Gowers (07/23/22 1354)  Other Apparatus: O2 (3L) (07/23/22 1354)  Assistance: Minimal assistance;Contact guard assistance (07/23/22 1354)  Quality of Gait: clayton foot drop (CharcoMarieTooth), slow no lob, wc follow for safety as she fatigues easily (07/23/22 1354)  Gait Deviations: Slow Lea;Decreased step length;Decreased step height;Decreased head and trunk rotation (07/23/22 1354)  Distance: 14 feet x2 (07/23/22 1354)  Comments: assist with balance reactions and walker management, appears SOB- per pt and family this appears near baseline but pt subjectively reports weakness (07/23/22 1203)  More Ambulation?: No (07/23/22 1354)  Gait Training: Yes (07/23/22 1413)  Overall Level of Assistance: Contact-guard assistance;Minimum assistance (07/23/22 1413)  Ambulation  Surface: Level surface (07/23/22 1425)  Stairs:     W/C mobility:       Occupational Therapy:   Hand Dominance: Right  ADL  Feeding: Setup (07/23/22 1233)  Grooming: Setup (07/23/22 1233)  Grooming Skilled Clinical Factors: pt supine in bed with HOB elevated (07/23/22 1233)  UE Bathing: Setup;Verbal cueing; Increased time to complete (07/23/22 1233)  LE Bathing: Moderate assistance (07/23/22 1233)  LE Bathing Skilled Clinical Factors: pt able to wash top of legs, assist for lower leg, B feet, and buttocks (07/23/22 1233)  UE Dressing: Setup; Increased time to complete (07/23/22 1233)  LE Dressing: Dependent/Total (07/23/22 1233)  LE Dressing Skilled Clinical Factors: Dep to doff/don socks, pt declined pants d/t report of increased diarrhea (07/23/22 1233)  Toileting: Dependent/Total (07/23/22 1233)  Toileting Skilled Clinical Factors: Dep to change breif and complete hygiene in bed (07/23/22 1233)  Toilet Transfers  Toilet Transfer: Unable to assess (07/23/22 1241)     Shower Transfers  Shower Transfers: Not tested (07/23/22 1241)  Shower Transfers Comments: pt with abdominal drain, pt bathed at sink in room (07/23/22 1241)    Speech Therapy: Diet/Swallow:        Dysphagia Outcome Severity Scale: Level 6: Within functional limits/Modified independence  Compensatory Swallowing Strategies : Alternate solids and liquids, Eat/Feed slowly, Upright as possible for all oral intake, Small bites/sips      Result Date: 7/18/2022  CT OF THE BRAIN WITHOUT CONTRAST HISTORY: Dequan Rivera is a Female of 80 years age with history of headache and dizziness. COMPARISON: July 19, 2017 TECHNIQUE:  Spiral CT examination of the brain was performed without intravenous contrast.  Images were obtained from the base of the skull up to the convexities in axial slices, and then examined in the blood, brain parenchymal and bony windows. FINDINGS:  No acute changes are noted. There is no evidence for mass, mass effect or midline shift. No abnormal extra-axial fluid collections are appreciated. Age-appropriate cortical volume loss is seen. There are patchy areas of hypoattenuation in a sub-cortical, central white and periventricular distribution consistent with nonspecific ischemic small vessel disease. Calcifications are again seen in the basal ganglia. There are no acute parenchymal alterations, blood byproducts or abnormal extracerebral fluid. The calvarium is grossly intact. The visualized paranasal sinuses show mucoceles or polyps in the maxillary sinuses. Mild fluid is seen in the mastoid air cells that is not significantly changed and is consistent with chronic changes. Sesar Gatica 1.Age-appropriate cortical atrophy and periventricular microangiopathy. When compared to previous study there has been no significant interval change. 2. No acute hemorrhage or extra-axial fluid collection All CT scans at this facility use dose modulation, iterative reconstruction, and/or weight based dosing when appropriate to reduce radiation dose to as low as reasonably achievable. CT ABSCESS DRAINAGE W CATH PLACEMENT S&I    1. Successful drainage and drain placement of right colonic soft tissue mass with central fluid collection. 2.Fluid was aspirated and sent for microbiology and cytology analysis. Additionally a 10 Guine[place] drain was placed yielding a thick red to yellow fluid with internal yellow debris. HISTORY: Enma Baumann is a Female of [address] years age. DIAGNOSIS:   Right abdominal mass with possible fluid collection vs necrotic tissue COMPARISON: None available. CT Dose-Length Product (estimate related to radiation exposure from this exam):  541.6  mGy*cm. PROCEDURE: Following the discussion of the procedure, alternatives, risks versus benefits, informed consent was obtained from the patient. Specifically, risks of after-biopsy pain at the site, rare possibility of excessive hemorrhage, infection, injury to the adjacent organs were discussed and the patient verbalized understanding. Pre-procedure evaluation confirmed that the patient was an appropriate candidate for conscious sedation. Adequate sedation was maintained during the entire procedure. Vital signs, pulse oximetry, and response to verbal commands were monitored and recorded by the nurse throughout the procedure and the recovery period. Medical information was entered in the medical record including the medications and dosages used. The patient returned to baseline neurologic and physiologic status prior to leaving the department. No immediate sedation related complications were noted. Medication for conscious sedation was administered via IV route. 45 minutes of conscious sedation was provided. Following universal protocol, patient and site verification was performed with a \"timeout\" prior to the procedure. The patient was placed on the CT table in supine  position and the right lateral abdomen area was prepped and draped in usual sterile fashion. Using the usual sterile conditions, lidocaine and CT guidance, the fluid collection within soft tissue density  was accessed using a Yueh needle.   After confirmation of appropriate localization of the needle, aspiration yielded a thick red to yellow fluid which was sent for microbiology and cytology analysis. Following that an Amplatz wire was placed through the Yueh sheath into the abscess under CT guidance. The tract was serially dilated with 6, 8, and 10 [place] dilators respectively. Following that a 10 Irish drainage catheter was advanced over the wire into the abscess under CT guidance and the anchoring loop was formed. Catheter was sutured to the skin and and secured with Percufix device. The patient tolerated the procedure well. No immediate complications identified. The patient left the CT suite in supine position to the recovery room in stable condition. Total local anesthetic used: lidocaine, approximately 15 mL. Estimated blood loss:  None. CT GUIDED NEEDLE PLACEMENT    1. Successful drainage and drain placement of right colonic soft tissue mass with central fluid collection. 2.Fluid was aspirated and sent for microbiology and cytology analysis. Additionally a 10 Irish drain was placed yielding a thick red to yellow fluid with internal yellow debris. HISTORY: Olena Mercer is a Female of 80 years age. DIAGNOSIS:   Right abdominal mass with possible fluid collection vs necrotic tissue COMPARISON: None available. CT Dose-Length Product (estimate related to radiation exposure from this exam):  541.6  mGy*cm. PROCEDURE: Following the discussion of the procedure, alternatives, risks versus benefits, informed consent was obtained from the patient. Specifically, risks of after-biopsy pain at the site, rare possibility of excessive hemorrhage, infection, injury to the adjacent organs were discussed and the patient verbalized understanding. Pre-procedure evaluation confirmed that the patient was an appropriate candidate for conscious sedation. Adequate sedation was maintained during the entire procedure.   Vital signs, pulse oximetry, and response to verbal commands were monitored and recorded by the nurse throughout the procedure and the recovery period. Medical information was entered in the medical record including the medications and dosages used. The patient returned to baseline neurologic and physiologic status prior to leaving the department. No immediate sedation related complications were noted. Medication for conscious sedation was administered via IV route. 45 minutes of conscious sedation was provided. Following universal protocol, patient and site verification was performed with a \"timeout\" prior to the procedure. The patient was placed on the CT table in supine  position and the right lateral abdomen area was prepped and draped in usual sterile fashion. Using the usual sterile conditions, lidocaine and CT guidance, the fluid collection within soft tissue density  was accessed using a Yueh needle. After confirmation of appropriate localization of the needle, aspiration yielded a thick red to yellow fluid which was sent for microbiology and cytology analysis. Following that an Amplatz wire was placed through the Yueh sheath into the abscess under CT guidance. The tract was serially dilated with 6, 8, and 10 [place] dilators respectively. Following that a 10 [place] drainage catheter was advanced over the wire into the abscess under CT guidance and the anchoring loop was formed. Catheter was sutured to the skin and and secured with Percufix device. The patient tolerated the procedure well. No immediate complications identified. The patient left the CT suite in supine position to the recovery room in stable condition. Total local anesthetic used: lidocaine, approximately 15 mL. Estimated blood loss:  None. CT ABDOMEN PELVIS W IV CONTRAST Additional Contrast? None    After extensive review of the records and above physical exam, I have formulated the following diagnoses and plan:      DIAGNOSES:    1.   The patient was admitted to the acute rehabilitation consulted for mobility and endurance issues and should be performed 1 to 2 times per day, 7 days per week for the length of stay. Occupational therapy will be consulted for activities of daily living and should be performed 1 to 2 times per day, 7 days per week for the length of stay. Their capacity to participate at an acute level, decision to be treated in the gym, room or on the unit, their activity goals for the day and the number of minutes of active participation will be reassessed and re-prescribed daily. Because this patient is medically complex, I will check a CBC, BMP, UA and orthostatic blood pressures. They will be reassessed daily regarding their ability to participate in an acute level rehabilitation program.  Recreational Therapy will be consulted for community re-entry and adjustment to disability. Communication, cognitive and emotional issues will also be addressed during this rehabilitation stay by rehabilitation psychologist or speech therapist as appropriate. I reviewed the patient's old and current charts and discussed other rehabilitation options with the rehabilitation team including the rehab RN and the admission team as well as the patient. I feel that the patient's functional recovery would be best served at an acute inpatient rehabilitation program because the patient needs intense therapy three hours per day, direct RN supervision and daily monitoring by a physician for medical status. This cannot be sufficiently provided by home health care, a skilled nursing facility or in an outpatient setting. I further feel that the patient has the potential to improve functional abilities in an acute intensive rehabilitation program.    Old records were reviewed and summarized. 2.  Other diagnoses which complicate rehabilitation stay include: Active Problems:    * No active hospital problems. *  Resolved Problems:    * No resolved hospital problems.  *  Patient Active Problem List Diagnosis    Lumbar stenosis with neurogenic claudication    Acquired scoliosis    Osteoporosis    Charcot Arleen Tooth muscular atrophy    Excess weight    Osteoarthritis of lumbar spine with myelopathy    Osteoarthritis    Myelopathy (HCC)    Impaired mobility and activities of daily living due to CHF [facility] re-admit 2/19/2021    Headache    Depression    Ascending aortic aneurysm (HCC)    Descending aortic aneurysm (HCC)    Dizziness    Shortness of breath    Essential hypertension    Acute on chronic combined systolic and diastolic CHF (congestive heart failure) (HCC)    Gait abnormality    A-fib (HCC)    Pleural effusion    Hypoxia    Acute pulmonary edema (HCC)    Acute on chronic combined systolic (congestive) and diastolic (congestive) heart failure (HCC)    Impaired mobility    Acute cystitis with hematuria    Chronic diastolic CHF (congestive heart failure) (HCC)    Right lower quadrant abdominal pain    Hyponatremia    Hypokalemia    Anemia    CHF (congestive heart failure) (HCC)    Hypothyroid    Abdominal mass    Central retinal vein occlusion, left eye, stable         I am especially concerned about their recent medical complexities. The patient's high risk medication use includes  see mar. The patient is high risk for urinary tract infection, an admission urinalysis has been ordered. I will have the nurses check post void residual bladder volumes and place acatheter if excessive urine is retained in the bladder after voiding. The patient is risk for deep venous thromosis, complex deep venous thrombosis protocol prophylaxis has been ordered  see mar. The patient is high risk for orthostasis and a hydration program and orthostatic blood pressure screening have been ordered. I will attempt to get old records from the patient's previous hospital stay. Care everywhere on [employer] was utilized.     3.  Current and previous medications were reviewed and summarized and compared to old medication lists from home and from the acute floor. 4.  Complex labs and x-rays were reviewed. I will review patient's old EKG and labs. 5.  Will provide emotional support for this patient regarding adjustment to their disability. Cognition and mood will be screened daily and addressed by rehabilitation psychology and/or speech therapy as appropriate. I have encouraged the patient to attend the Rehab Adjustment to Disability Support Group and recreational therapy. 6.  Estimated length of stay is   2-3 weeks. Discharge to home with help from family and home health PT, OT, RN, and aide. Patient should be independent at discharge. 7.  The patient's medical and rehab prognosis are good. [address] regarding the patient's back up to general medical needs. A welcome letter was presented with an explanation of my services, my specialty and what to expect during the rehabilitation process. As well as introducing myself, I also wrote my name on their bedside marker board with their name as well as the names of the other physicians with an explanation of our individual roles in their care, as well as the rehab process.         Fidelina Noble D.O., F.A.A.P.M.&R.

## 2022-07-23 NOTE — PROGRESS NOTES
Attempted to review home medications with patient's son. Stated he cannot state specifically what his mom takes but he brought in a list while she was on prior floor. Did not see the list in the chart at this time. Asked if he could provide another list at this time. Electronically signed by Niranjan Boss RN on 7/23/22 at 9:53 AM EDT     Son brought in bag of medications, home med list reviewed. Copy put into blue chart.  Electronically signed by Niranjan Boss RN on 7/23/22 at 12:14 PM EDT

## 2022-07-23 NOTE — PROGRESS NOTES
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  Patient Active Problem List    Diagnosis Date Noted    A-fib [facility])     Impaired mobility and activities of daily living due to CHF exac, [facility] re-admit 2021     Abdominal mass 2022    Right lower quadrant abdominal pain 2022    Hyponatremia 2022    Hypokalemia 2022    Anemia 2022    CHF (congestive heart failure) ([address] 2022    Hypothyroid 2022    Central retinal vein occlusion, left eye, stable 2021    Lumbar stenosis with neurogenic claudication 2016    Chronic diastolic CHF (congestive heart failure) ([address] 2022    Acute cystitis with hematuria 2021    Impaired mobility 2021    Acute on chronic combined systolic (congestive) and diastolic (congestive) heart failure ([address] 2021    Pleural effusion 2021    Hypoxia     Acute pulmonary edema (HCC)     Gait abnormality 2021    Acute on chronic combined systolic and diastolic CHF (congestive heart failure) ([address] 2021    Essential hypertension 2018    Shortness of breath     Dizziness     Ascending aortic aneurysm ([address] 2017    Descending aortic aneurysm ([address] 2017    Osteoarthritis     Myelopathy ([address]     Headache     Depression     Acquired scoliosis 2016    Osteoporosis 2016    Charcot Arleen Tooth muscular atrophy 2016    Excess weight 2016    Osteoarthritis of lumbar spine with myelopathy 2016       Past Medical History:   Diagnosis Date    Ascending aortic aneurysm ([address] 2017    Charcot Arleen Tooth muscular atrophy     CHF (congestive heart failure) (HCC)     Chronic diastolic CHF (congestive heart failure) ([address] 2022 Depression     Descending aortic aneurysm ([facility] 6/23/2017    Essential hypertension 2/16/2018    Headache     HTN (hypertension)     Impaired mobility and activities of daily living     Lumbar stenosis with neurogenic claudication     Myelopathy ([facility]     Osteoarthritis      Past Surgical History:   Procedure Laterality Date    JOINT REPLACEMENT Bilateral     knees    OTHER SURGICAL HISTORY Right 07/21/2022    cat scan guided abdominal mass aspiration with drain placement by Dr. Wagner Puala Left 02/25/2021    LEFT PLEURAL CATHETER INSERTION performed by Jean Baez MD at [place] [id number] Left 01/29/2021    total of 755 cc removed per Dr Elenita Kumar specimen sent to lab       Restrictions:  Restrictions/Precautions  Restrictions/Precautions: Fall Risk (High per negro score)  Required Braces or Orthoses?: No  Position Activity Restriction  Other position/activity restrictions: Abdominal drain    Subjective:  General  Chart Reviewed: Yes  Patient assessed for rehabilitation services?: Yes  Referring Practitioner: Dr. Laura Bonds  Diagnosis: Impaired mobility and ADL's due to Charcot Sherice Sage Tooth Neuropathy    Patient's date of birth confirmed: Yes     Pain at start of treatment: No    Pain at end of treatment: No    Location: n/a  Nursing notified: No  Intervention: None    Social Functional:  Social/Functional History  Lives With: Alone  Type of Home: [address] [place] [address],[address]: One level  Home Access: Stairs to enter with rails  Entrance Stairs - Number of Steps: 2  Entrance Stairs - Rails: Both  Bathroom Shower/Tub: Tub/Shower unit  Bathroom Equipment: Shower chair;Grab bars in shower  Home Equipment: Cane  ADL Assistance: [address]: Independent  Homemaking Responsibilities: Yes  Meal Prep Responsibility: Primary  Laundry Responsibility: Primary  Cleaning Responsibility: Primary  Shopping Responsibility: No (Son performs)  Ambulation Assistance: Independent (furniture walking and cane prn)  Transfer Assistance: Independent  Active : No  Patient's  Info: family  Additional Comments: Social-functional information provided by son and dtr. Son and dtr reported pt declined to use w/w in home or in community. Son reported pt has B AFO's at home for \"drop foot\". Attempted  via [address] [id number], but pt [place] and difficult to understand . Objective:    Self Care Status:  ADL  Feeding: Setup  Grooming: Setup  Grooming Skilled Clinical Factors: pt supine in bed with HOB elevated  UE Bathing: Setup;Verbal cueing; Increased time to complete  LE Bathing: Moderate assistance  LE Bathing Skilled Clinical Factors: pt able to wash top of legs, assist for lower leg, B feet, and buttocks  UE Dressing: Setup; Increased time to complete  LE Dressing: Dependent/Total  LE Dressing Skilled Clinical Factors: Dep to doff/don socks, pt declined pants d/t report of increased diarrhea  Toileting: Dependent/Total  Toileting Skilled Clinical Factors: Dep to change breif and complete hygiene in bed  Toilet Transfers  Toilet Transfer: Unable to assess  Shower Transfers  Shower Transfers: Not tested  [employer] Comments: pt with abdominal drain, pt bathed at sink in room      Functional Mobility:  Balance  Sitting Balance: Stand by assistance  Standing Balance: Minimal assistance  Functional Mobility  Functional - Mobility Device: Rolling Walker  Activity: Other (side to step to [facility])  Assist Level: Minimal assistance    Bed mobility and transfers:  Bed mobility  Rolling to Left: Contact guard assistance  Rolling to Right: Minimal assistance  Supine to Sit: Moderate assistance  Sit to Supine:  Moderate assistance  Transfers  Sit to stand: Minimal assistance  Stand to sit: Minimal assistance      Observation:  Observation/Palpation  Posture: Fair (forward head, rounded shoulders, increased T kyphosis)  Observation: alert, pleasant, cooperative, on 3L O2 NC(baseline at home), appears SOB upon exertion, R side abdominal drain present    Orientation and Cognition:  Orientation  Overall Orientation Status: Within Functional Limits  Cognition  Overall Cognitive Status: Exceptions  Arousal/Alertness: Appropriate responses to stimuli  Following Commands: Follows one step commands with repetition  Attention Span: Appears intact  Memory: Appears intact  Safety Judgement: Decreased awareness of need for assistance;Decreased awareness of need for safety  Problem Solving: Assistance required to generate solutions;Assistance required to implement solutions  Insights: Fully aware of deficits  Initiation: Requires cues for some  Sequencing: Requires cues for some  Cognition Comment: Comp: Supervision, Exp: Supervision, Social: IND, Prob: Min A, Mem: Supervision    Patient's cognition will improve or maintain baseline to safely perform ADLs:  Comprehension: Supervision  Expression: Supervision  Social Interaction: Mod I  Problem Solving: Min A  Memory: Supervision    Vision and Hearing:  Vision  Vision Exceptions:  (Needs glasses, but will not wear them per daughter)  Hearing  Hearing: Exceptions to [facility]  Hearing Exceptions: Hard of hearing/hearing concerns; No hearing aid  Perception  Overall Perceptual Status: WFL    Range of Motion:  LUE AROM (degrees)  LUE AROM : WFL  Left Hand AROM (degrees)  Left Hand AROM: WFL  RUE AROM (degrees)  RUE AROM : WFL  Right Hand AROM (degrees)  Right Hand AROM: WFL      Strength:  LUE Strength  L Hand General: 3+/5  LUE Strength Comment: 3+/5  RUE Strength  R Hand General: 3+/5  RUE Strength Comment: 3+/5    Quality of Movement:   Tone RUE  RUE Tone: Normotonic  Tone LUE  LUE Tone: Normotonic  Coordination  Movements Are Fluid And Coordinated: No  Coordination and Movement Description: Fine motor impairments;Decreased speed;Right UE;Left UE    Sensation:  Sensation  Overall Sensation Status: Impaired (neuropathy in B feet)    Hand Dominance  Hand Dominance: Right    Safety: Assessment:  Activity Tolerance  Activity Tolerance: Patient Tolerated treatment well    Assessment  Performance deficits / Impairments: Decreased functional mobility ; Decreased ADL status; Decreased strength;Decreased endurance;Decreased high-level IADLs;Decreased fine motor control;Decreased coordination  Assessment: Pt is a 80 y.o. female with above mentioned deficits impairing ability to complete ADL's at reported baseline. Pt may benefit from OT services to address defiicts and maximize safety and function during ADL's. Prognosis: Good  Decision Making: Medium Complexity  History: multi comorb  Exam: 7 perf imp  Assistance / Modification: Mod A  Discharge Recommendations: Continue to assess pending progress  Plan  REQUIRES OT FOLLOW-UP: Yes    Equipment needed:  OT Equipment Recommendations  Other: pt may benefit from LB AE to increase IND and decrease pain    OT Education:  Education Given To: Patient  Education Provided: Plan of Care, Role of Therapy  Education Method: Verbal  Barriers to Learning: Other (Comment), Hearing (language)  Education Outcome: Verbalized understanding      Plan: Addendum to add plan 7/26/2022. Electronically signed by OSMANI Carrasco/L on 7/26/2022 at 10:17 AM   Plan  Times per Week: 5-7  Plan Weeks: 1-2  Current Treatment Recommendations: Functional mobility training; Endurance training;Strengthening; Safety education & training;Patient/Caregiver education & training;Equipment evaluation, education, & procurement;Self-Care / ADL; Home management training    Goals:  Patient goals : \"To be able to stand up and move around on my own. \"    Long Term Goal 1: Pt will increased BUE stength by 1/2 MMT grade. Long Term Goal 2: Pt will be IND with ECWS techniques. Long Term Goal 3: Pt will be Mod IND with LB dressing.   Long Term Goal 4: Pt will be Mod IND for functional mobility.    - Patient will complete self care as followed using the recommended adaptive equipment and/or adaptive techniques as instructed:  Feeding: Independent  Grooming: Independent  Bathing: Mod I  UE Dressing: Independent  LE Dressing: Mod I  Toileting: Independent  Toilet Transfer: Mod I  Shower/Tub Transfer: Mod I  - Patient will improve static and dynamic standing balance to complete pants management at Mod IND level  - Patient will improve functional endurance to tolerate/complete 30-45 minutes of ADLs. - Patient will improve B UE strength and endurance to increase by 1/2 MMT grade in order to participate in self-care activities as projected. - Patient will improve B hand fine motor coordination to fair+ in order to manage clothing fasteners/self-care containers in a timely manner  - Patient will perform kitchen mobility at device level without episodes of LOB and good safety awareness   - Patient will perform basic room mobility at [facility] level. - Patient will access appropriate D/C site with as few architectural barriers as possible. - Patient and/or caregiver will demonstrate understanding of energy conservation techniques with out verbal/tactile cues. - Pt's current cognitive status is:  Patient's cognition will improve or maintain baseline to safely perform ADLs:  Comprehension: Supervision   Expression: Supervision  Social Interaction: Mod IND  Problem Solving: Supervision  Memory: Supervision     Therapy Time:   Individual   Time In 0928   Time Out 1035   Minutes 67     Eval: 67 minutes     Therapist entered room at 9:05 a.m. Attempted ipad for . Pt with RYNE. RN arrived to administer medications. Son arrived around 9:28 a.m. as RN leaving room. Electronically signed by:     SERAFIN Sandoval,   7/23/2022, 1:01 PM

## 2022-07-23 NOTE — FLOWSHEET NOTE
Patient assessment completed. Son at bedside. Drain assessed 50mL noted. Call light within reach. Pt denies pain. Pt c/o diarrhea.

## 2022-07-24 LAB
ALBUMIN SERPL-MCNC: 2.7 G/DL (ref 3.5–4.6)
ALP BLD-CCNC: 51 U/L (ref 40–130)
ALT SERPL-CCNC: 19 U/L (ref 0–33)
ANION GAP SERPL CALCULATED.3IONS-SCNC: 12 MEQ/L (ref 9–15)
AST SERPL-CCNC: 20 U/L (ref 0–35)
BANDED NEUTROPHILS RELATIVE PERCENT: 2 % (ref 5–11)
BASOPHILS ABSOLUTE: 0.1 K/UL (ref 0–0.2)
BASOPHILS RELATIVE PERCENT: 1 %
BILIRUB SERPL-MCNC: 0.3 MG/DL (ref 0.2–0.7)
BLOOD CULTURE, ROUTINE: NORMAL
BUN BLDV-MCNC: 14 MG/DL (ref 8–23)
C DIFF TOXIN/ANTIGEN: NORMAL
CALCIUM SERPL-MCNC: 8.3 MG/DL (ref 8.5–9.9)
CHLORIDE BLD-SCNC: 99 MEQ/L (ref 95–107)
CO2: 30 MEQ/L (ref 20–31)
CREAT SERPL-MCNC: 0.56 MG/DL (ref 0.5–0.9)
CULTURE, BLOOD 2: NORMAL
EOSINOPHILS ABSOLUTE: 0 K/UL (ref 0–0.7)
EOSINOPHILS RELATIVE PERCENT: 3.7 %
GFR AFRICAN AMERICAN: >60
GFR NON-AFRICAN AMERICAN: >60
GLOBULIN: 3.3 G/DL (ref 2.3–3.5)
GLUCOSE BLD-MCNC: 118 MG/DL (ref 70–99)
HCT VFR BLD CALC: 32.7 % (ref 37–47)
HEMOGLOBIN: 10.7 G/DL (ref 12–16)
LYMPHOCYTES ABSOLUTE: 0.8 K/UL (ref 1–4.8)
LYMPHOCYTES RELATIVE PERCENT: 7 %
MAGNESIUM: 1.7 MG/DL (ref 1.7–2.4)
MCH RBC QN AUTO: 27.3 PG (ref 27–31.3)
MCHC RBC AUTO-ENTMCNC: 32.7 % (ref 33–37)
MCV RBC AUTO: 83.5 FL (ref 82–100)
METAMYELOCYTES RELATIVE PERCENT: 2 %
MONOCYTES ABSOLUTE: 0.7 K/UL (ref 0.2–0.8)
MONOCYTES RELATIVE PERCENT: 5.8 %
NEUTROPHILS ABSOLUTE: 10.1 K/UL (ref 1.4–6.5)
NEUTROPHILS RELATIVE PERCENT: 83 %
PDW BLD-RTO: 14.2 % (ref 11.5–14.5)
PLATELET # BLD: 327 K/UL (ref 130–400)
PLATELET SLIDE REVIEW: ADEQUATE
POTASSIUM REFLEX MAGNESIUM: 3.2 MEQ/L (ref 3.4–4.9)
RBC # BLD: 3.92 M/UL (ref 4.2–5.4)
RBC # BLD: NORMAL 10*6/UL
SODIUM BLD-SCNC: 141 MEQ/L (ref 135–144)
TOTAL PROTEIN: 6 G/DL (ref 6.3–8)
WBC # BLD: 11.6 K/UL (ref 4.8–10.8)

## 2022-07-24 PROCEDURE — 6370000000 HC RX 637 (ALT 250 FOR IP): Performed by: NURSE PRACTITIONER

## 2022-07-24 PROCEDURE — 1180000000 HC REHAB R&B

## 2022-07-24 PROCEDURE — 2580000003 HC RX 258: Performed by: INTERNAL MEDICINE

## 2022-07-24 PROCEDURE — 2700000000 HC OXYGEN THERAPY PER DAY

## 2022-07-24 PROCEDURE — 99222 1ST HOSP IP/OBS MODERATE 55: CPT | Performed by: INTERNAL MEDICINE

## 2022-07-24 PROCEDURE — 6370000000 HC RX 637 (ALT 250 FOR IP): Performed by: INTERNAL MEDICINE

## 2022-07-24 PROCEDURE — 6360000002 HC RX W HCPCS: Performed by: INTERNAL MEDICINE

## 2022-07-24 PROCEDURE — 87449 NOS EACH ORGANISM AG IA: CPT

## 2022-07-24 PROCEDURE — 83735 ASSAY OF MAGNESIUM: CPT

## 2022-07-24 PROCEDURE — 80053 COMPREHEN METABOLIC PANEL: CPT

## 2022-07-24 PROCEDURE — 6360000002 HC RX W HCPCS: Performed by: NURSE PRACTITIONER

## 2022-07-24 PROCEDURE — 87324 CLOSTRIDIUM AG IA: CPT

## 2022-07-24 PROCEDURE — 85025 COMPLETE CBC W/AUTO DIFF WBC: CPT

## 2022-07-24 PROCEDURE — 36415 COLL VENOUS BLD VENIPUNCTURE: CPT

## 2022-07-24 PROCEDURE — 6370000000 HC RX 637 (ALT 250 FOR IP): Performed by: PHYSICAL MEDICINE & REHABILITATION

## 2022-07-24 RX ORDER — L. ACIDOPHILUS/L.BULGARICUS 1MM CELL
2 TABLET ORAL 3 TIMES DAILY
Status: DISCONTINUED | OUTPATIENT
Start: 2022-07-24 | End: 2022-07-29 | Stop reason: HOSPADM

## 2022-07-24 RX ORDER — LOPERAMIDE HYDROCHLORIDE 2 MG/1
2 CAPSULE ORAL 4 TIMES DAILY PRN
Status: DISCONTINUED | OUTPATIENT
Start: 2022-07-24 | End: 2022-07-29 | Stop reason: HOSPADM

## 2022-07-24 RX ORDER — CARVEDILOL 6.25 MG/1
6.25 TABLET ORAL 2 TIMES DAILY WITH MEALS
Status: DISCONTINUED | OUTPATIENT
Start: 2022-07-24 | End: 2022-07-29 | Stop reason: HOSPADM

## 2022-07-24 RX ORDER — POTASSIUM CHLORIDE 7.45 MG/ML
10 INJECTION INTRAVENOUS PRN
Status: DISCONTINUED | OUTPATIENT
Start: 2022-07-24 | End: 2022-07-29 | Stop reason: HOSPADM

## 2022-07-24 RX ADMIN — PIPERACILLIN AND TAZOBACTAM 3375 MG: 3; .375 INJECTION, POWDER, LYOPHILIZED, FOR SOLUTION INTRAVENOUS at 12:14

## 2022-07-24 RX ADMIN — LACTOBACILLUS TAB 2 TABLET: TAB at 13:39

## 2022-07-24 RX ADMIN — CARVEDILOL 6.25 MG: 6.25 TABLET, FILM COATED ORAL at 17:36

## 2022-07-24 RX ADMIN — MICONAZOLE NITRATE: 2 POWDER TOPICAL at 08:56

## 2022-07-24 RX ADMIN — MICONAZOLE NITRATE: 2 POWDER TOPICAL at 19:41

## 2022-07-24 RX ADMIN — POTASSIUM CHLORIDE 10 MEQ: 7.46 INJECTION, SOLUTION INTRAVENOUS at 13:01

## 2022-07-24 RX ADMIN — CARVEDILOL 6.25 MG: 6.25 TABLET, FILM COATED ORAL at 11:07

## 2022-07-24 RX ADMIN — PIPERACILLIN AND TAZOBACTAM 3375 MG: 3; .375 INJECTION, POWDER, LYOPHILIZED, FOR SOLUTION INTRAVENOUS at 04:33

## 2022-07-24 RX ADMIN — Medication 10 ML: at 19:42

## 2022-07-24 RX ADMIN — POTASSIUM CHLORIDE 10 MEQ: 7.46 INJECTION, SOLUTION INTRAVENOUS at 14:07

## 2022-07-24 RX ADMIN — POTASSIUM CHLORIDE 10 MEQ: 7.46 INJECTION, SOLUTION INTRAVENOUS at 11:02

## 2022-07-24 RX ADMIN — CITALOPRAM HYDROBROMIDE 20 MG: 10 TABLET ORAL at 08:36

## 2022-07-24 RX ADMIN — PANTOPRAZOLE SODIUM 40 MG: 40 TABLET, DELAYED RELEASE ORAL at 06:49

## 2022-07-24 RX ADMIN — POTASSIUM CHLORIDE 10 MEQ: 7.46 INJECTION, SOLUTION INTRAVENOUS at 15:11

## 2022-07-24 RX ADMIN — LACTOBACILLUS TAB 2 TABLET: TAB at 19:41

## 2022-07-24 RX ADMIN — PIPERACILLIN AND TAZOBACTAM 3375 MG: 3; .375 INJECTION, POWDER, LYOPHILIZED, FOR SOLUTION INTRAVENOUS at 19:50

## 2022-07-24 RX ADMIN — Medication 10 ML: at 08:56

## 2022-07-24 RX ADMIN — LEVOTHYROXINE SODIUM 88 MCG: 88 TABLET ORAL at 08:37

## 2022-07-24 RX ADMIN — LOPERAMIDE HYDROCHLORIDE 2 MG: 2 CAPSULE ORAL at 13:42

## 2022-07-24 RX ADMIN — RIVAROXABAN 15 MG: 15 TABLET, FILM COATED ORAL at 17:36

## 2022-07-24 ASSESSMENT — PAIN SCALES - GENERAL
PAINLEVEL_OUTOF10: 0
PAINLEVEL_OUTOF10: 0

## 2022-07-24 NOTE — CONSULTS
Hematology/Oncology Consult  Encounter Date: 2022 2:31 PM    Ms. Lucie Torres is a 80 y.o. female  : 1932  MRN: [id number]  Acct Number: [de-identified]  Requesting Provider: Dr.M. Justyna Anders    Reason for request:  Colon vs ovarian cancer concern      CONSULTANT: Noel Russell MD    HPI: The pt is a 79 yo female who developed epigastric and right -sided abd pain. Ct abd/pelvis more suspicious of  pericolic abscess than tumor. Drain was placed with improvement in the lower abd pain. She was also started on IV Zosyn  . The RLQ abd pain decreased. The pt developed non-bloody diarrhea. Stool was negative for C.dif.     Patient Active Problem List   Diagnosis    Lumbar stenosis with neurogenic claudication    Acquired scoliosis    Osteoporosis    Charcot Arleen Tooth muscular atrophy    Excess weight    Osteoarthritis of lumbar spine with myelopathy    Osteoarthritis    Myelopathy (HCC)    Impaired mobility and activities of daily living due to CHF ex, [facility] re-admit 2021    Headache    Depression    Ascending aortic aneurysm (HCC)    Descending aortic aneurysm (HCC)    Dizziness    Shortness of breath    Essential hypertension    Acute on chronic combined systolic and diastolic CHF (congestive heart failure) (HCC)    Gait abnormality    A-fib (HCC)    Pleural effusion    Hypoxia    Acute pulmonary edema (HCC)    Acute on chronic combined systolic (congestive) and diastolic (congestive) heart failure (HCC)    Impaired mobility    Acute cystitis with hematuria    Chronic diastolic CHF (congestive heart failure) (HCC)    Right lower quadrant abdominal pain    Hyponatremia    Hypokalemia    Anemia    CHF (congestive heart failure) (HCC)    Hypothyroid    Abdominal mass    Central retinal vein occlusion, left eye, stable     Past Medical History:   Diagnosis Date    Ascending aortic aneurysm ([address] 2017    Charcot Arleen Tooth muscular atrophy     CHF (congestive heart failure) (HCC)     Chronic diastolic CHF (congestive heart failure) ([facility] 4/1/2022    Depression     Descending aortic aneurysm ([facility] [address] 6/23/2017    Essential hypertension 2/16/2018    Headache     HTN (hypertension)     Impaired mobility and activities of daily living     Lumbar stenosis with neurogenic claudication     Myelopathy ([facility] [facility]     Osteoarthritis      Past Surgical History:   Procedure Laterality Date    JOINT REPLACEMENT Bilateral     knees    OTHER SURGICAL HISTORY Right 07/21/2022    cat scan guided abdominal mass aspiration with drain placement by Dr. Martinez Luis Left 02/25/2021    LEFT PLEURAL CATHETER INSERTION performed by Lauren Doll MD at [place] [id number] Left 01/29/2021    total of 755 cc removed per Dr Durel Homans specimen sent to lab     Family History   Problem Relation Age of Onset    Arthritis Mother     Arthritis Father     High Blood Pressure Father      Social History     Socioeconomic History    Marital status:       Spouse name: Not on file    Number of children: Not on file    Years of education: Not on file    Highest education level: Not on file   Occupational History    Not on file   Tobacco Use    Smoking status: Never    Smokeless tobacco: Never   Substance and Sexual Activity    Alcohol use: No     Alcohol/week: 0.0 standard drinks    Drug use: No    Sexual activity: Not on file   Other Topics Concern    Not on file   Social History Narrative    , Lives With: Alone, son lives down the street, dtr is in the area    Type of Home: [address]: One level    Home Access: Stairs to enter with rails- Number of Steps: 2- Rails: Both    Bathroom Shower/Tub: Tub/Shower unit, Bathroom Equipment: Grab bars in shower, Shower chair    Home Equipment: Rolling walker, Cane(Pt infrequemtly uses DME for ambulation and prefers to furniture walk in home)    ADL Assistance: Independent, [facility] Homemaking Responsibilities: Yes    Ambulation Assistance: Independent, Transfer Assistance: Independent    Additional Comments: Son stops over 2 times daily         Social Determinants of Health     Financial Resource Strain: Not on file   Food Insecurity: Not on file   Transportation Needs: Not on file   Physical Activity: Not on file   Stress: Not on file   Social Connections: Not on file   Intimate Partner Violence: Not on file   Housing Stability: Not on file          Current Facility-Administered Medications   Medication Dose Route Frequency Provider Last Rate Last Admin    carvedilol (COREG) tablet 6.25 mg  6.25 mg Oral BID WC Jorge J Holiday, DO   6.25 mg at 07/24/22 1107    potassium chloride 10 mEq/100 mL IVPB (Peripheral Line)  10 mEq IntraVENous PRN ADRIENNE Richter  mL/hr at 07/24/22 1407 10 mEq at 07/24/22 1407    lactobacillus acidophilus (FLORANEX) 2 tablet  2 tablet Oral TID ADRIENNE Richter - NP   2 tablet at 07/24/22 1339    loperamide (IMODIUM) capsule 2 mg  2 mg Oral 4x Daily PRN ADRIENNE Richter - NP   2 mg at 07/24/22 1342    pantoprazole (PROTONIX) tablet 40 mg  40 mg Oral QAM [facility] Julianne Velarde MD   40 mg at 07/24/22 0649    sodium chloride flush 0.9 % injection 5-40 mL  5-40 mL IntraVENous 2 times per day Roxanne Gupta MD   10 mL at 07/24/22 0856    sodium chloride flush 0.9 % injection 5-40 mL  5-40 mL IntraVENous PRN Roxanne Gupta MD        0.9 % sodium chloride infusion   IntraVENous PRN Roxanne Gupta MD        ondansetron (ZOFRAN-ODT) disintegrating tablet 4 mg  4 mg Oral Q8H PRN Roxanne Gupta MD        Or    ondansetron (ZOFRAN) injection 4 mg  4 mg IntraVENous Q6H PRN Roxanne Gupta MD        polyethylene glycol (GLYCOLAX) packet 17 g  17 g Oral Daily PRN Roxanne Gupta MD        acetaminophen (TYLENOL) tablet 650 mg  650 mg Oral Q6H PRN Roxanne Gupta MD        Or    acetaminophen (TYLENOL) suppository 650 mg  650 mg Rectal Q6H PRN Roxanne Gupta MD piperacillin-tazobactam (ZOSYN) 3,375 mg in dextrose 5 % 50 mL IVPB extended infusion (mini-bag)  3,375 mg IntraVENous Q8H Clary Daniels MD   Stopped at 07/24/22 1257    citalopram (CELEXA) tablet 20 mg  20 mg Oral Daily Clary Daniels MD   20 mg at 07/24/22 0836    ferrous sulfate (IRON 325) tablet 325 mg  325 mg Oral Every Other Day Clary Daniels MD   325 mg at 07/23/22 3243    levothyroxine (SYNTHROID) tablet 88 mcg  88 mcg Oral Daily Clary Daniels MD   88 mcg at 07/24/22 7964    rivaroxaban (XARELTO) tablet 15 mg  15 mg Oral Daily Clary Daniels MD   15 mg at 07/23/22 1722    miconazole (MICOTIN) 2 % powder   Topical BID Yulisa Allen MD   Given at 07/24/22 6340     Outpatient Medications Marked as Taking for the 7/22/22 encounter [facility] Encounter)   Medication Sig Dispense Refill    meclizine (ANTIVERT) 12.5 MG tablet Take 12.5 mg by mouth 4 times daily as needed      potassium chloride (KLOR-CON M) 20 MEQ extended release tablet Take 20 mEq by mouth daily (with breakfast)       Allergies   Allergen Reactions    Latex     Tape [Adhesive Tape]          ROS:  Unremarkable except for symptoms mentioned in HPI. PHYSICAL EXAMINATION:   VITAL SIGNS: /82   Pulse 97   Temp 97.7 °F (36.5 °C) (Oral)   Resp 18   SpO2 97%       GENERAL: In no acute distress, well- nourished, well- developed, alert and oriented to person place and time. SKIN: Warm and dry, without jaundice, ecchymoses, or petechiae. HEENT: Normocephalic, sclera anicteric, oral mucosa moist without lesion or exudate in the visible oral cavity or oropharynx, no epistaxis   NECK: supple no JVD; trachea midline  NODES: No palpable adenopathy in the neck Levels I-V, bilateral supraclavicular fossae, axillary chains, or inguinal regions. LUNGS: Good inspiratory effort, no accessory muscle use, clear bilaterally, no focal wheeze, rales or rhonchi.    CARDIAC: Normal HS; Regular rate and rhythm,  ABDOMINAL: Normal bowel sounds present, soft, + mild tenderness over right periumbilical area ;no mass or  organomegaly. + drainage catheter attached to accordion drainage bag containing bloody fluid  MUSKL: no tenderness over spine or ribs   EXTREMITIES: no pedal edema or calf tenderness  NEUROLOGIC: Gait not tested No grossly apparent focal deficits.     LAB RESULTS:  Recent Results (from the past 24 hour(s))   Comprehensive Metabolic Panel w/ Reflex to MG    Collection Time: 07/24/22  5:00 AM   Result Value Ref Range    Sodium 141 135 - 144 mEq/L    Potassium reflex Magnesium 3.2 (L) 3.4 - 4.9 mEq/L    Chloride 99 95 - 107 mEq/L    CO2 30 20 - 31 mEq/L    Anion Gap 12 9 - 15 mEq/L    Glucose 118 (H) 70 - 99 mg/dL    BUN 14 8 - 23 mg/dL    Creatinine 0.56 0.50 - 0.90 mg/dL    GFR Non-African American >60.0 >60    GFR  >60.0 >60    Calcium 8.3 (L) 8.5 - 9.9 mg/dL    Total Protein 6.0 (L) 6.3 - 8.0 g/dL    Albumin 2.7 (L) 3.5 - 4.6 g/dL    Total Bilirubin 0.3 0.2 - 0.7 mg/dL    Alkaline Phosphatase 51 40 - 130 U/L    ALT 19 0 - 33 U/L    AST 20 0 - 35 U/L    Globulin 3.3 2.3 - 3.5 g/dL   CBC with Auto Differential    Collection Time: 07/24/22  5:00 AM   Result Value Ref Range    WBC 11.6 (H) 4.8 - 10.8 K/uL    RBC 3.92 (L) 4.20 - 5.40 M/uL    Hemoglobin 10.7 (L) 12.0 - 16.0 g/dL    Hematocrit 32.7 (L) 37.0 - 47.0 %    MCV 83.5 82.0 - 100.0 fL    MCH 27.3 27.0 - 31.3 pg    MCHC 32.7 (L) 33.0 - 37.0 %    RDW 14.2 11.5 - 14.5 %    Platelets 238 953 - 920 K/uL    PLATELET SLIDE REVIEW Adequate     Neutrophils % 83.0 %    Lymphocytes % 7.0 %    Monocytes % 5.8 %    Eosinophils % 3.7 %    Basophils % 1.0 %    Neutrophils Absolute 10.1 (H) 1.4 - 6.5 K/uL    Lymphocytes Absolute 0.8 (L) 1.0 - 4.8 K/uL    Monocytes Absolute 0.7 0.2 - 0.8 K/uL    Eosinophils Absolute 0.0 0.0 - 0.7 K/uL    Basophils Absolute 0.1 0.0 - 0.2 K/uL    Bands Relative 2 (L) 5 - 11 %    Metamyelocytes Relative 2 %    RBC Morphology Normal    Magnesium Collection Time: 07/24/22  5:00 AM   Result Value Ref Range    Magnesium 1.7 1.7 - 2.4 mg/dL   Clostridium Difficile Toxin/Antigen    Collection Time: 07/24/22 10:55 AM    Specimen: Stool   Result Value Ref Range    C.diff Toxin/Antigen       Negative for Clostridium difficile antigen and toxin  Normal Range: Negative       Recent Labs     07/24/22  0500   GLUCOSE 118*       Latest Reference Range & Units 7/19/22 13:49 7/21/22 12:26   ,C125 <38 U/mL 75 (H)    CA 19-9 0 - 35 U/mL  7   CEA <3.9 ng/mL 1.4 1.2     RADIOLOGY RESULTS:  CT ABDOMEN PELVIS WO CONTRAST Additional Contrast? Oral    Result Date: 7/18/2022  CT of the Abdomen and Pelvis without intravenous contrast medium History:  Approximate 1 week history of abdominal pain Technical Factors: CT imaging of the abdomen and pelvis were obtained and formatted as 5 mm contiguous axial images from the domes of the diaphragm to the symphysis pubis. Sagittal and coronal reconstructions were also obtained. Oral contrast medium: Barium sulfate, 450 mL. Intravenous contrast medium:  None. Comparison:  CT abdomen pelvis, July 18, 2022, 1444 hours, February 8, 2021. Findings: Residual intravenous contrast medium from recent CT examination identified. Lungs:  Lung bases clear. Cardiac size enlarged, unchanged. Calcification at level of mitral valve. Small hiatal hernia. Liver:  Normal in size, shape, and attenuation. Bile Ducts:  Normal in caliber. Gallbladder:  No stones or wall thickening. Pancreas:  Normal without masses, cysts, ductal dilatation or calcification. Spleen:  Normal in size without masses or calcifications. No splenules. Kidneys:  Normal in size. No hydronephrosis, masses, or stones. Adrenals:  Normal. Small bowel:  Normal in caliber. See \"peritoneum \". Appendix:  Not visualized. Colon:  Normal in caliber. See \"peritoneum \". Peritoneum:  No free air.  A bilobed, 10.0 x 8.0 x 10.6 cm mass having central rounded septated areas of low attenuation, 3.7 x 3.5 x 5.4 cm, and displacing cecum and ascending colon anteriorly (series 2, image 50, series 601, image 54, series 602, image 36). No calcification identified. Trace adjacent fat stranding demonstrated. Vessels: Aorta normal in course and caliber. Lymph nodes:  Retroperitoneal:  No enlarged retroperitoneal lymph nodes. Mesenteric:  No enlarged mesenteric lymph nodes. Pelvic: No enlarged pelvic lymph nodes. Ureters: Normal in course and caliber. No calcifications. Bladder: No wall thickening. Reproductive organs: 8.0 x 5.8 x 7.5 cm left adnexal cyst displacing urinary bladder right and laterally, anteriorly, and inferiorly (series 2, image 68, series 601, image 42, series 602, image 52). Abdominal Wall: Fat identified bilateral inguinal canals. Bones:  No bone lesions. Multilevel disc space narrowing lumbar spine. No post operative changes. Complex 10.0 x 8.0 x 10.6 cm mass with septated central low attenuation fluid collections as discussed. Mass displaces cecum and ascending colon anteriorly. Differential includes malignancy including appendiceal mucocele/carcinoma, as well as retroperitoneal sarcoma with central necrotic change. Infection/abscess secondary consideration, minimal presence of adjacent inflammatory change. 8.0 x 5.8 x 7.5 cm left adnexal cysts as discussed, most likely ovarian in etiology. If clinical concern warrants, pelvic sonography may be obtained for further evaluation. Other findings discussed. All CT scans at this facility use dose modulation, iterative reconstruction, and/or weight based dosing when appropriate to reduce radiation dose to as low as reasonably achievable. CT Head WO Contrast    Result Date: 7/18/2022  CT OF THE BRAIN WITHOUT CONTRAST HISTORY: Gerhardt Layer is a Female of 80 years age with history of headache and dizziness.  COMPARISON: July 19, 2017 TECHNIQUE:  Spiral CT examination of the brain was performed without intravenous contrast.  Images were obtained from the base of the skull up to the convexities in axial slices, and then examined in the blood, brain parenchymal and bony windows. FINDINGS:  No acute changes are noted. There is no evidence for mass, mass effect or midline shift. No abnormal extra-axial fluid collections are appreciated. Age-appropriate cortical volume loss is seen. There are patchy areas of hypoattenuation in a sub-cortical, central white and periventricular distribution consistent with nonspecific ischemic small vessel disease. Calcifications are again seen in the basal ganglia. There are no acute parenchymal alterations, blood byproducts or abnormal extracerebral fluid. The calvarium is grossly intact. The visualized paranasal sinuses show mucoceles or polyps in the maxillary sinuses. Mild fluid is seen in the mastoid air cells that is not significantly changed and is consistent with chronic changes. Vearl Pippins 1.Age-appropriate cortical atrophy and periventricular microangiopathy. When compared to previous study there has been no significant interval change. 2. No acute hemorrhage or extra-axial fluid collection All CT scans at this facility use dose modulation, iterative reconstruction, and/or weight based dosing when appropriate to reduce radiation dose to as low as reasonably achievable. CT ABSCESS DRAINAGE W CATH PLACEMENT S&I    1. Successful drainage and drain placement of right colonic soft tissue mass with central fluid collection. 2.Fluid was aspirated and sent for microbiology and cytology analysis. Additionally a 10 Malawian drain was placed yielding a thick red to yellow fluid with internal yellow debris. HISTORY: Darshan Pfeiffer is a Female of 80 years age. DIAGNOSIS:   Right abdominal mass with possible fluid collection vs necrotic tissue COMPARISON: None available. CT Dose-Length Product (estimate related to radiation exposure from this exam):  541.6  mGy*cm.  PROCEDURE: Following the discussion of the procedure, alternatives, risks versus benefits, informed consent was obtained from the patient. Specifically, risks of after-biopsy pain at the site, rare possibility of excessive hemorrhage, infection, injury to the adjacent organs were discussed and the patient verbalized understanding. Pre-procedure evaluation confirmed that the patient was an appropriate candidate for conscious sedation. Adequate sedation was maintained during the entire procedure. Vital signs, pulse oximetry, and response to verbal commands were monitored and recorded by the nurse throughout the procedure and the recovery period. Medical information was entered in the medical record including the medications and dosages used. The patient returned to baseline neurologic and physiologic status prior to leaving the department. No immediate sedation related complications were noted. Medication for conscious sedation was administered via IV route. 45 minutes of conscious sedation was provided. Following universal protocol, patient and site verification was performed with a \"timeout\" prior to the procedure. The patient was placed on the CT table in supine  position and the right lateral abdomen area was prepped and draped in usual sterile fashion. Using the usual sterile conditions, lidocaine and CT guidance, the fluid collection within soft tissue density  was accessed using a Yueh needle. After confirmation of appropriate localization of the needle, aspiration yielded a thick red to yellow fluid which was sent for microbiology and cytology analysis. Following that an Amplatz wire was placed through the Yueh sheath into the abscess under CT guidance. The tract was serially dilated with 6, 8, and 10 [place] dilators respectively. Following that a 10 Nigerian drainage catheter was advanced over the wire into the abscess under CT guidance and the anchoring loop was formed. Catheter was sutured to the skin and and secured with Percufix device.  The patient tolerated the procedure well. No immediate complications identified. The patient left the CT suite in supine position to the recovery room in stable condition. Total local anesthetic used: lidocaine, approximately 15 mL. Estimated blood loss:  None. CT GUIDED NEEDLE PLACEMENT    1. Successful drainage and drain placement of right colonic soft tissue mass with central fluid collection. 2.Fluid was aspirated and sent for microbiology and cytology analysis. Additionally a 10 Romanian drain was placed yielding a thick red to yellow fluid with internal yellow debris. HISTORY: Laisha Lopez is a Female of 80 years age. DIAGNOSIS:   Right abdominal mass with possible fluid collection vs necrotic tissue COMPARISON: None available. CT Dose-Length Product (estimate related to radiation exposure from this exam):  541.6  mGy*cm. PROCEDURE: Following the discussion of the procedure, alternatives, risks versus benefits, informed consent was obtained from the patient. Specifically, risks of after-biopsy pain at the site, rare possibility of excessive hemorrhage, infection, injury to the adjacent organs were discussed and the patient verbalized understanding. Pre-procedure evaluation confirmed that the patient was an appropriate candidate for conscious sedation. Adequate sedation was maintained during the entire procedure. Vital signs, pulse oximetry, and response to verbal commands were monitored and recorded by the nurse throughout the procedure and the recovery period. Medical information was entered in the medical record including the medications and dosages used. The patient returned to baseline neurologic and physiologic status prior to leaving the department. No immediate sedation related complications were noted. Medication for conscious sedation was administered via IV route. 45 minutes of conscious sedation was provided.  Following universal protocol, patient and site verification was performed with a \"timeout\" prior to the procedure. The patient was placed on the CT table in supine  position and the right lateral abdomen area was prepped and draped in usual sterile fashion. Using the usual sterile conditions, lidocaine and CT guidance, the fluid collection within soft tissue density  was accessed using a Yueh needle. After confirmation of appropriate localization of the needle, aspiration yielded a thick red to yellow fluid which was sent for microbiology and cytology analysis. Following that an Amplatz wire was placed through the Yueh sheath into the abscess under CT guidance. The tract was serially dilated with 6, 8, and 10 [place] dilators respectively. Following that a 10 Turkish drainage catheter was advanced over the wire into the abscess under CT guidance and the anchoring loop was formed. Catheter was sutured to the skin and and secured with Percufix device. The patient tolerated the procedure well. No immediate complications identified. The patient left the CT suite in supine position to the recovery room in stable condition. Total local anesthetic used: lidocaine, approximately 15 mL. Estimated blood loss:  None. CT ABDOMEN PELVIS W IV CONTRAST Additional Contrast? None    Result Date: 7/18/2022  Comparison: February 8, 2021 History: Right lower quadrant abdominal pain  Technique: CT ABDOMEN PELVIS W IV CONTRAST Helically Acquired CT of the  abdomen and pelvis was performed during the intravenous infusion of 100 mL of Isovue-370. Axial delayed images were also performed. Reformatted sagittal and coronal images were also  obtained. Findings: The visualized bases of the lungs shows no consolidating pneumonia or pleural effusion. There is atelectasis seen in the bases. The thoracic aorta and visualized portion is tortuous and mildly dilated at 4 cm in greatest transverse diameter which is stable. . In the right lower quadrant of the abdomen there are multiple bowel loops seen that are peripheral to mass to r/o possible ovarian CA. Thank you, Dr. Diego Velarde, for this consultation.       Electronically signed by Micaela Middleton MD on 7/24/2022 at 2:31 PM

## 2022-07-24 NOTE — H&P
Subjective: The patient complains of ,\" moderate acute on chronic progressive fatigue and   weakness  partially relieved by rest, medications, PT,  OT,   SLP and rest and exacerbated by recent illness  . I am concerned about patients medical complexities and barriers to advancing in rehab goals including  diarrhea . I reviewed current care and plans for further care with other rehab providers including nursing and case management. According to recent nursing note, \"  see notes \". ROS x10: The patient also complains of severely impaired mobility and activities of daily living. Otherwise no new problems with vision, hearing, nose, mouth, throat, dermal, cardiovascular, GI, , pulmonary, musculoskeletal, psychiatric or neurological. See also Acute Rehab PM&R H&P. Vital signs:  /82   Pulse 75   Temp 97.7 °F (36.5 °C) (Oral)   Resp 18   SpO2 97%   I/O:   PO/Intake:  fair PO intake,    diet    Bowel:   incontinent   Bladder: incontinent  no taylor  General:  Patient is well developed,   adequately nourished, and    well kempt. HEENT:    Pupils equal, hearing intact to loud voice, external inspection of ear and nose benign. Inspection of lips, tongue and gums benign    Musculoskeletal: No significant change in strength or tone. All joints stable. Inspection and palpation of digits and nails show no clubbing, cyanosis or inflammatory conditions. Neuro/Psychiatric: Affect: flat but pleasant. Alert and oriented to person, place and situation with    cues. No significant change in deep tendon reflexes or sensation  Lungs:  Diminished, CTA-B. Respiration effort is   normal at rest.     Heart:   S1 = S2,   RRR. Abdomen:  Soft, non-tender, no enlargement of liver or spleen.   Extremities:    lower extremity edema    Skin:   Intact to general survey,      Rehabilitation:  Physical Therapy:   Bed mobility:  Bed mobility  Rolling to Left: Minimal assistance (07/23/22 1142)  Rolling to Right: Minimal assistance (07/23/22 1142)  Supine to Sit: Moderate assistance (07/23/22 1142)  Sit to Supine: Moderate assistance (07/23/22 1142)  Bed Mobility Comments: several rolling trials for hygiene d/t bowel/bladder incontinence, HOB flat. Encouraged log roll with tactile cues. Increased time and effort, dizziness reported upon initial sitting posture which resolves quickly. (07/23/22 1142)  Transfers:  Transfers  Sit to Stand: Minimal Assistance (07/23/22 1221)  Stand to sit: Minimal Assistance (07/23/22 1221)  Bed to Chair: Minimal assistance (07/23/22 1221)  Stand Pivot Transfers: Minimal Assistance (with 2ww) (07/23/22 1221)  Car Transfer: Unable to assess (07/23/22 1221)  Comment: decreased safety with hand placement despite VC and TC (07/23/22 1221)  Transfer Training  Transfer Training: Yes (07/23/22 1413)  Overall Level of Assistance: Contact-guard assistance;Minimum assistance (07/23/22 1413)  Interventions: Tactile cues (07/23/22 1413)  Sit to Stand: Minimum assistance;Contact-guard assistance (07/23/22 1413)  Stand to Sit: Minimum assistance;Contact-guard assistance (07/23/22 1413)  Transfers  Surface:  To chair without arms (07/23/22 1145)  Additional Factors: Hand placement cues (07/23/22 1145)  Device: Ysabel Kai (07/23/22 1145)  Sit to Stand  Assistance Level: Contact guard assist (07/23/22 1145)  Skilled Clinical Factors: cues for hand placement (07/23/22 1145)  Stand to Sit  Assistance Level: Contact guard assist;Minimal assistance (07/23/22 1145)  Gait:   Ambulation  WB Status: wbat (07/23/22 1354)  Ambulation  Surface: level tile (07/23/22 1354)  Device: Standard Walker (07/23/22 1354)  Other Apparatus: O2 (3L) (07/23/22 1354)  Assistance: Minimal assistance;Contact guard assistance (07/23/22 1354)  Quality of Gait: clayton foot drop (CharcoMarieTooth), slow no lob, wc follow for safety as she fatigues easily (07/23/22 7032)  Gait Deviations: Slow Lea;Decreased step length;Decreased step height;Decreased head and trunk rotation (07/23/22 1354)  Distance: 14 feet x2 (07/23/22 1354)  Comments: assist with balance reactions and walker management, appears SOB- per pt and family this appears near baseline but pt subjectively reports weakness (07/23/22 1203)  More Ambulation?: No (07/23/22 1354)  Gait Training: Yes (07/23/22 1413)  Overall Level of Assistance: Contact-guard assistance;Minimum assistance (07/23/22 1413)  Ambulation  Surface: Level surface (07/23/22 1425)  Stairs:     W/C mobility:       Occupational Therapy:   Hand Dominance: Right  ADL  Feeding: Setup (07/23/22 1233)  Grooming: Setup (07/23/22 1233)  Grooming Skilled Clinical Factors: pt supine in bed with HOB elevated (07/23/22 1233)  UE Bathing: Setup;Verbal cueing; Increased time to complete (07/23/22 1233)  LE Bathing: Moderate assistance (07/23/22 1233)  LE Bathing Skilled Clinical Factors: pt able to wash top of legs, assist for lower leg, B feet, and buttocks (07/23/22 1233)  UE Dressing: Setup; Increased time to complete (07/23/22 1233)  LE Dressing: Dependent/Total (07/23/22 1233)  LE Dressing Skilled Clinical Factors: Dep to doff/don socks, pt declined pants d/t report of increased diarrhea (07/23/22 1233)  Toileting: Dependent/Total (07/23/22 1233)  Toileting Skilled Clinical Factors: Dep to change breif and complete hygiene in bed (07/23/22 1233)  Toilet Transfers  Toilet Transfer: Unable to assess (07/23/22 1241)     Shower Transfers  Shower Transfers: Not tested (07/23/22 1241)  Shower Transfers Comments: pt with abdominal drain, pt bathed at sink in room (07/23/22 1241)    Speech Therapy:            Diet/Swallow:        Dysphagia Outcome Severity Scale: Level 6: Within functional limits/Modified independence  Compensatory Swallowing Strategies :  Alternate solids and liquids, Eat/Feed slowly, Upright as possible for all oral intake, Small bites/sips          Lab/X-ray studies reviewed, analyzed and discussed with patient and staff:   Recent Results (from the past 24 hour(s))   Comprehensive Metabolic Panel w/ Reflex to MG    Collection Time: 07/24/22  5:00 AM   Result Value Ref Range    Sodium 141 135 - 144 mEq/L    Potassium reflex Magnesium 3.2 (L) 3.4 - 4.9 mEq/L    Chloride 99 95 - 107 mEq/L    CO2 30 20 - 31 mEq/L    Anion Gap 12 9 - 15 mEq/L    Glucose 118 (H) 70 - 99 mg/dL    BUN 14 8 - 23 mg/dL    Creatinine 0.56 0.50 - 0.90 mg/dL    GFR Non-African American >60.0 >60    GFR  >60.0 >60    Calcium 8.3 (L) 8.5 - 9.9 mg/dL    Total Protein 6.0 (L) 6.3 - 8.0 g/dL    Albumin 2.7 (L) 3.5 - 4.6 g/dL    Total Bilirubin 0.3 0.2 - 0.7 mg/dL    Alkaline Phosphatase 51 40 - 130 U/L    ALT 19 0 - 33 U/L    AST 20 0 - 35 U/L    Globulin 3.3 2.3 - 3.5 g/dL   CBC with Auto Differential    Collection Time: 07/24/22  5:00 AM   Result Value Ref Range    WBC 11.6 (H) 4.8 - 10.8 K/uL    RBC 3.92 (L) 4.20 - 5.40 M/uL    Hemoglobin 10.7 (L) 12.0 - 16.0 g/dL    Hematocrit 32.7 (L) 37.0 - 47.0 %    MCV 83.5 82.0 - 100.0 fL    MCH 27.3 27.0 - 31.3 pg    MCHC 32.7 (L) 33.0 - 37.0 %    RDW 14.2 11.5 - 14.5 %    Platelets 635 984 - 076 K/uL    PLATELET SLIDE REVIEW Adequate     Neutrophils % 83.0 %    Lymphocytes % 7.0 %    Monocytes % 5.8 %    Eosinophils % 3.7 %    Basophils % 1.0 %    Neutrophils Absolute 10.1 (H) 1.4 - 6.5 K/uL    Lymphocytes Absolute 0.8 (L) 1.0 - 4.8 K/uL    Monocytes Absolute 0.7 0.2 - 0.8 K/uL    Eosinophils Absolute 0.0 0.0 - 0.7 K/uL    Basophils Absolute 0.1 0.0 - 0.2 K/uL    Bands Relative 2 (L) 5 - 11 %    Metamyelocytes Relative 2 %    RBC Morphology Normal    Magnesium    Collection Time: 07/24/22  5:00 AM   Result Value Ref Range    Magnesium 1.7 1.7 - 2.4 mg/dL   Clostridium Difficile Toxin/Antigen    Collection Time: 07/24/22 10:55 AM    Specimen: Stool   Result Value Ref Range    C.diff Toxin/Antigen       Negative for Clostridium difficile antigen and toxin  Normal Range: Negative         CT ABDOMEN PELVIS WO CONTRAST Additional Contrast? Oral    Result Date: 7/18/2022  CT of the Abdomen and Pelvis without intravenous contrast medium History:  Approximate 1 week history of abdominal pain Technical Factors: CT imaging of the abdomen and pelvis were obtained and formatted as 5 mm contiguous axial images from the domes of the diaphragm to the symphysis pubis. Sagittal and coronal reconstructions were also obtained. Oral contrast medium: Barium sulfate, 450 mL. Intravenous contrast medium:  None. Comparison:  CT abdomen pelvis, July 18, 2022, 1444 hours, February 8, 2021. Findings: Residual intravenous contrast medium from recent CT examination identified. Lungs:  Lung bases clear. Cardiac size enlarged, unchanged. Calcification at level of mitral valve. Small hiatal hernia. Liver:  Normal in size, shape, and attenuation. Bile Ducts:  Normal in caliber. Gallbladder:  No stones or wall thickening. Pancreas:  Normal without masses, cysts, ductal dilatation or calcification. Spleen:  Normal in size without masses or calcifications. No splenules. Kidneys:  Normal in size. No hydronephrosis, masses, or stones. Adrenals:  Normal. Small bowel:  Normal in caliber. See \"peritoneum \". Appendix:  Not visualized. Colon:  Normal in caliber. See \"peritoneum \". Peritoneum:  No free air. A bilobed, 10.0 x 8.0 x 10.6 cm mass having central rounded septated areas of low attenuation, 3.7 x 3.5 x 5.4 cm, and displacing cecum and ascending colon anteriorly (series 2, image 50, series 601, image 54, series 602, image 36). No calcification identified. Trace adjacent fat stranding demonstrated. Vessels: Aorta normal in course and caliber. Lymph nodes:  Retroperitoneal:  No enlarged retroperitoneal lymph nodes. Mesenteric:  No enlarged mesenteric lymph nodes. Pelvic: No enlarged pelvic lymph nodes. Ureters: Normal in course and caliber. No calcifications. Bladder: No wall thickening.  Reproductive organs: 8.0 x 5.8 x 7.5 cm left adnexal cyst displacing urinary bladder right and laterally, anteriorly, and inferiorly (series 2, image 68, series 601, image 42, series 602, image 52). Abdominal Wall: Fat identified bilateral inguinal canals. Bones:  No bone lesions. Multilevel disc space narrowing lumbar spine. No post operative changes. Complex 10.0 x 8.0 x 10.6 cm mass with septated central low attenuation fluid collections as discussed. Mass displaces cecum and ascending colon anteriorly. Differential includes malignancy including appendiceal mucocele/carcinoma, as well as retroperitoneal sarcoma with central necrotic change. Infection/abscess secondary consideration, minimal presence of adjacent inflammatory change. 8.0 x 5.8 x 7.5 cm left adnexal cysts as discussed, most likely ovarian in etiology. If clinical concern warrants, pelvic sonography may be obtained for further evaluation. Other findings discussed. All CT scans at this facility use dose modulation, iterative reconstruction, and/or weight based dosing when appropriate to reduce radiation dose to as low as reasonably achievable. CT Head WO Contrast    Result Date: 7/18/2022  CT OF THE BRAIN WITHOUT CONTRAST HISTORY: Nuha Montenegro is a Female of 80 years age with history of headache and dizziness. COMPARISON: July 19, 2017 TECHNIQUE:  Spiral CT examination of the brain was performed without intravenous contrast.  Images were obtained from the base of the skull up to the convexities in axial slices, and then examined in the blood, brain parenchymal and bony windows. FINDINGS:  No acute changes are noted. There is no evidence for mass, mass effect or midline shift. No abnormal extra-axial fluid collections are appreciated. Age-appropriate cortical volume loss is seen. There are patchy areas of hypoattenuation in a sub-cortical, central white and periventricular distribution consistent with nonspecific ischemic small vessel disease. monitored and recorded by the nurse throughout the procedure and the recovery period. Medical information was entered in the medical record including the medications and dosages used. The patient returned to baseline neurologic and physiologic status prior to leaving the department. No immediate sedation related complications were noted. Medication for conscious sedation was administered via IV route. 45 minutes of conscious sedation was provided. Following universal protocol, patient and site verification was performed with a \"timeout\" prior to the procedure. The patient was placed on the CT table in supine  position and the right lateral abdomen area was prepped and draped in usual sterile fashion. Using the usual sterile conditions, lidocaine and CT guidance, the fluid collection within soft tissue density  was accessed using a Yueh needle. After confirmation of appropriate localization of the needle, aspiration yielded a thick red to yellow fluid which was sent for microbiology and cytology analysis. Following that an Amplatz wire was placed through the Yueh sheath into the abscess under CT guidance. The tract was serially dilated with 6, 8, and 10 Austine Corti dilators respectively. Following that a 10 Tunisian drainage catheter was advanced over the wire into the abscess under CT guidance and the anchoring loop was formed. Catheter was sutured to the skin and and secured with Percufix device. The patient tolerated the procedure well. No immediate complications identified. The patient left the CT suite in supine position to the recovery room in stable condition. Total local anesthetic used: lidocaine, approximately 15 mL. Estimated blood loss:  None. CT GUIDED NEEDLE PLACEMENT    1. Successful drainage and drain placement of right colonic soft tissue mass with central fluid collection. 2.Fluid was aspirated and sent for microbiology and cytology analysis.  Additionally a 10 Tunisian drain was placed yielding a thick red to yellow fluid with internal yellow debris. HISTORY: Enma Baumann is a Female of 80 years age. DIAGNOSIS:   Right abdominal mass with possible fluid collection vs necrotic tissue COMPARISON: None available. CT Dose-Length Product (estimate related to radiation exposure from this exam):  541.6  mGy*cm. PROCEDURE: Following the discussion of the procedure, alternatives, risks versus benefits, informed consent was obtained from the patient. Specifically, risks of after-biopsy pain at the site, rare possibility of excessive hemorrhage, infection, injury to the adjacent organs were discussed and the patient verbalized understanding. Pre-procedure evaluation confirmed that the patient was an appropriate candidate for conscious sedation. Adequate sedation was maintained during the entire procedure. Vital signs, pulse oximetry, and response to verbal commands were monitored and recorded by the nurse throughout the procedure and the recovery period. Medical information was entered in the medical record including the medications and dosages used. The patient returned to baseline neurologic and physiologic status prior to leaving the department. No immediate sedation related complications were noted. Medication for conscious sedation was administered via IV route. 45 minutes of conscious sedation was provided. Following universal protocol, patient and site verification was performed with a \"timeout\" prior to the procedure. The patient was placed on the CT table in supine  position and the right lateral abdomen area was prepped and draped in usual sterile fashion. Using the usual sterile conditions, lidocaine and CT guidance, the fluid collection within soft tissue density  was accessed using a Yueh needle. After confirmation of appropriate localization of the needle, aspiration yielded a thick red to yellow fluid which was sent for microbiology and cytology analysis.  Following that an Amplatz wire was placed through the Yueh sheath into the abscess under CT guidance. The tract was serially dilated with 6, 8, and 10 [place] dilators respectively. Following that a 10 Swedish drainage catheter was advanced over the wire into the abscess under CT guidance and the anchoring loop was formed. Catheter was sutured to the skin and and secured with Percufix device. The patient tolerated the procedure well. No immediate complications identified. The patient left the CT suite in supine position to the recovery room in stable condition. Total local anesthetic used: lidocaine, approximately 15 mL. Estimated blood loss:  None. CT ABDOMEN PELVIS W IV CONTRAST Additional Contrast? None    Result Date: 7/18/2022  Comparison: February 8, 2021 History: Right lower quadrant abdominal pain  Technique: CT ABDOMEN PELVIS W IV CONTRAST Helically Acquired CT of the  abdomen and pelvis was performed during the intravenous infusion of 100 mL of Isovue-370. Axial delayed images were also performed. Reformatted sagittal and coronal images were also  obtained. Findings: The visualized bases of the lungs shows no consolidating pneumonia or pleural effusion. There is atelectasis seen in the bases. The thoracic aorta and visualized portion is tortuous and mildly dilated at 4 cm in greatest transverse diameter which is stable. . In the right lower quadrant of the abdomen there are multiple bowel loops seen that are peripheral to hypodense collections which could be from fluid or proteinaceous collections such as abscess. This the loops of bowel appear to be a ascending colon and  ileum. The largest walled off collections measure 4.5 x 3.8 x 3.3 cm and 4.4 x 3.4 x 4.2 cm. There is pericolic stranding seen. The left colon is unremarkable. A cystic structure is again seen in the pelvis that measures 6.7 x 8.6 x 9.8 cm. A small amount of free fluid is seen in the posterior pelvis. Bilateral inguinal hernias are seen.  The hernia on the left contains Active Problem List   Diagnosis    Lumbar stenosis with neurogenic claudication    Acquired scoliosis    Osteoporosis    Charcot Arleen Tooth muscular atrophy    Excess weight    Osteoarthritis of lumbar spine with myelopathy    Osteoarthritis    Myelopathy (HCC)    Impaired mobility and activities of daily living due to CHF exac, [facility] re-admit 2/19/2021    Headache    Depression    Ascending aortic aneurysm (HCC)    Descending aortic aneurysm (HCC)    Dizziness    Shortness of breath    Essential hypertension    Acute on chronic combined systolic and diastolic CHF (congestive heart failure) (HCC)    Gait abnormality    A-fib (HCC)    Pleural effusion    Hypoxia    Acute pulmonary edema (HCC)    Acute on chronic combined systolic (congestive) and diastolic (congestive) heart failure (HCC)    Impaired mobility    Acute cystitis with hematuria    Chronic diastolic CHF (congestive heart failure) (HCC)    Right lower quadrant abdominal pain    Hyponatremia    Hypokalemia    Anemia    CHF (congestive heart failure) (HCC)    Hypothyroid    Abdominal mass    Central retinal vein occlusion, left eye, stable            Focus of today's plan-  Initiate and modify therapuetic plan to meet patients individual needs, add rest breaks as needed, and  Immodium added, CDiff neg  Cont Abx per ID, leukocytosis noted       MD Tevin Cosby D.O., PM&R     Attending    [address] [facility]

## 2022-07-24 NOTE — FLOWSHEET NOTE
Lu in with pt. MD wanted a culture done but explained that the particular drain model didn't have access to gain a culture. MD wants oren contacted in am about possibly culturing, Also wants gyn consulted for ovarian malignancy.

## 2022-07-24 NOTE — PROGRESS NOTES
NP contacted for patient's ongoing diarrhea. Has had at least 3-4 loose stools since Friday. Large watery stool this morning followed by semi formed stool. Abdomen distended with intermittent tenderness. On IV Zosyn s/p abdominal mass removal with drain placement. C-diff test ordered and sent to lab. IV Potassium replacement ordered for level of 3.2. Consents signed with son (POA) at bedside d/t language barrier. Manager aware that family has been visiting outside of regular hours to assist with communication. Patient can speak broken [place] but mainly speaks [place]. Will continue to follow with LPN.  Electronically signed by Niranjan Boss RN on 7/24/22 at 11:22 AM EDT     Negative for C-diff Electronically signed by Niranjan Boss RN on 7/24/22 at 11:51 AM EDT

## 2022-07-24 NOTE — PROGRESS NOTES
Patient vital signs taken for scheduled medication. Vital signs 130/83, HR 80 (heart rate manually taken). Patient did not need medication. Patient denies pain at this time, is asymptomatic, and was repositioned for comfort. Patient tolerated well. Patient drain holding steady at 100cc for the duration of this shift so far. Patient pleasant and cooperative. Will continue to monitor.

## 2022-07-24 NOTE — CONSULTS
Hospitalist Consult/Progress Note  7/24/2022 10:42 AM    Assessment and Plan:   RUQ/RLQ abdominal pain: CT scan abdomen showed pericolic abscess, fluid aspiration and drain placed on 7/19 with improvement of pain. ID on consult, IV zosyn Q8. PRN pain medications. Was seen by hem/onc while inpatient to r/o malignancy. CEA and cancer antigen were negative. Surgical culture pending. Diarrhea: Watery, loose stools x1 per chart, RN reported multiple episodes of diarrhea. X2 days. Stool sample ordered to rule out C-diff for precaution given antibiotic use. Continue Protonix, will add probiotics. Stool was negative for cdiff. Imodium ordered PRN. Hypokalemia: In the setting of diarrhea, PRN potassium repletion ordered, will recheck BMP in the AM.   CHF, PAF HTN, descending and ascending aortic aneursym: Cardiology on consult. Not in exacerbation. Restarted Coreg and Xarelto home medications. Lasix on hold, will resume when hypokalemia resolves. Hemodynamically stable, on telemetry. Most recent TTE shows 60% EF. Management per cardiology. Hypothyroidism: Continue home levothyroxine dose  Language barrier: daughter present. Utilize language line when needed however patient does refuse  HX DVT       Chronic respiratory failure: on 3lNC. Same as home. Continue. Generalized weakness, Gait instability and Decreased, Functional Status secondary to deconditioning: Fall precautions. PT OT to evaluate. Maximize nutrition status. Assessing if needs DME at home. SW on board. Diet: ADULT DIET; Regular  ADULT ORAL NUTRITION SUPPLEMENT; PM Snack, Lunch, Dinner; Standard High Calorie/High Protein Oral Supplement  Advance Directive: Full Code   Nutrition status: Supplemental Vitamins ordered. Dietitian assessment  Vaccinations: Immunization records reviewed. If has not received appropriate vaccinations, will order to be given prior to discharge. DVT prophylaxis: On oral AC  Discharge planning: MARCELLUS on board.    High Risk accessory muscles  Heart:  S1, S2 normal, RRR, no MRG appreciated  Abdomen: (+) BS, soft, non-tender; non distended no guarding or rigidity. Drain present RLQ  Extremities:  no cyanosis, no edema bilat lower exts. Dry, intact skin noted. Medications:      sodium chloride        carvedilol  6.25 mg Oral BID WC    pantoprazole  40 mg Oral QAM AC    sodium chloride flush  5-40 mL IntraVENous 2 times per day    piperacillin-tazobactam  3,375 mg IntraVENous Q8H    citalopram  20 mg Oral Daily    ferrous sulfate  325 mg Oral Every Other Day    levothyroxine  88 mcg Oral Daily    rivaroxaban  15 mg Oral Daily    miconazole   Topical BID       LABS Reviewed    IMAGING Reviewed    [facility]    Additional work up or/and treatment plan may be added today or then after based on clinical progression. I am managing a portion of pt care. Some medical issues are handled by other specialists and Primary Rehabilitation provider. Additional work up and treatment should be done in out pt setting by pt PCP and other out pt providers.

## 2022-07-24 NOTE — PROGRESS NOTES
Patient resting comfortably in bed. Bed alarm is on, call light is within reach. Patient IV running. Patient calm and pleasant. Patient denies pain at this time. Will continue to monitor.

## 2022-07-24 NOTE — CONSULTS
CC: Afib. Patient is a 80 y.o. female who initially presented with a chief complaint of abdominal pain. Patient is followed on a regular basis by Dr. Aliya Meredith MD. patient with past medical history of hypertension, congestive heart failure, paroxysmal atrial fibrillation, ascending and descending thoracic aortic aneurysms. Initially presented with abdominal pain and noted to have abdominal abscess status post surgical intervention. She is currently recovering in rehab unit. She complains of diarrhea. We are asked to evaluate patient for cardiac medications/resuming cardiac care. She denies any chest pain chest pressure heaviness. She denies any lightheadedness dizziness or palpitations. She states she does not feel well overall. Potassium is 3.2.   She is hemodynamically stable        Past Medical History:   Diagnosis Date    Ascending aortic aneurysm ([address] 6/23/2017    Charcot Arleen Tooth muscular atrophy     CHF (congestive heart failure) (HCC)     Chronic diastolic CHF (congestive heart failure) ([address] 4/1/2022    Depression     Descending aortic aneurysm ([address] 6/23/2017    Essential hypertension 2/16/2018    Headache     HTN (hypertension)     Impaired mobility and activities of daily living     Lumbar stenosis with neurogenic claudication     Myelopathy [facility])     Osteoarthritis       Patient Active Problem List   Diagnosis    Lumbar stenosis with neurogenic claudication    Acquired scoliosis    Osteoporosis    Charcot Arleen Tooth muscular atrophy    Excess weight    Osteoarthritis of lumbar spine with myelopathy    Osteoarthritis    Myelopathy ([address]    Impaired mobility and activities of daily living due to CHF exac, [facility] re-admit 2/19/2021    Headache    Depression    Ascending aortic aneurysm (HCC)    Descending aortic aneurysm (HCC)    Dizziness    Shortness of breath    Essential hypertension    Acute on chronic combined systolic and diastolic CHF (congestive heart failure) ([address] Gait abnormality    A-fib (HCC)    Pleural effusion    Hypoxia    Acute pulmonary edema (HCC)    Acute on chronic combined systolic (congestive) and diastolic (congestive) heart failure (HCC)    Impaired mobility    Acute cystitis with hematuria    Chronic diastolic CHF (congestive heart failure) (HCC)    Right lower quadrant abdominal pain    Hyponatremia    Hypokalemia    Anemia    CHF (congestive heart failure) (HCC)    Hypothyroid    Abdominal mass    Central retinal vein occlusion, left eye, stable       Past Surgical History:   Procedure Laterality Date    JOINT REPLACEMENT Bilateral     knees    OTHER SURGICAL HISTORY Right 07/21/2022    cat scan guided abdominal mass aspiration with drain placement by Dr. Corinne Ropes Left 02/25/2021    LEFT PLEURAL CATHETER INSERTION performed by Nataly Espinoza MD at [address] Left 01/29/2021    total of 755 cc removed per Dr Otis Wolfe specimen sent to lab       Social History     Socioeconomic History    Marital status:     Tobacco Use    Smoking status: Never    Smokeless tobacco: Never   Substance and Sexual Activity    Alcohol use: No     Alcohol/week: 0.0 standard drinks    Drug use: No   Social History Narrative    , Lives With: Alone, son lives down the street, dtr is in the area    Type of Home: [address]: One level    Home Access: Stairs to enter with rails- Number of Steps: 2- Rails: Both    Bathroom Shower/Tub: Tub/Shower unit, Bathroom Equipment: Grab bars in shower, Shower chair    Home Equipment: Rolling walker, Cane(Pt infrequemtly uses DME for ambulation and prefers to furniture walk in home)    ADL Assistance: [address], [address]: Independent    Homemaking Responsibilities: Yes    Ambulation Assistance: Independent, Transfer Assistance: Independent    Additional Comments: Son stops over 2 times daily           Family History   Problem Relation Age of Onset Arthritis Mother     Arthritis Father     High Blood Pressure Father        Current Facility-Administered Medications   Medication Dose Route Frequency Provider Last Rate Last Admin    carvedilol (COREG) tablet 6.25 mg  6.25 mg Oral BID WC Jorge Franco,         pantoprazole (PROTONIX) tablet 40 mg  40 mg Oral QAM AC Percy Velarde MD   40 mg at 07/24/22 0649    sodium chloride flush 0.9 % injection 5-40 mL  5-40 mL IntraVENous 2 times per day Damien Farris MD   10 mL at 07/24/22 0856    sodium chloride flush 0.9 % injection 5-40 mL  5-40 mL IntraVENous PRN Damien Farris MD        0.9 % sodium chloride infusion   IntraVENous PRN Damien Farris MD        ondansetron (ZOFRAN-ODT) disintegrating tablet 4 mg  4 mg Oral Q8H PRN Damien Farris MD        Or    ondansetron (ZOFRAN) injection 4 mg  4 mg IntraVENous Q6H PRN Damien Farris MD        polyethylene glycol (GLYCOLAX) packet 17 g  17 g Oral Daily PRN Damien Farris MD        acetaminophen (TYLENOL) tablet 650 mg  650 mg Oral Q6H PRN Damien Farris MD        Or    acetaminophen (TYLENOL) suppository 650 mg  650 mg Rectal Q6H PRN Damien Farris MD        piperacillin-tazobactam (ZOSYN) 3,375 mg in dextrose 5 % 50 mL IVPB extended infusion (mini-bag)  3,375 mg IntraVENous Q8H Damien Farris MD   Stopped at 07/24/22 0723    citalopram (CELEXA) tablet 20 mg  20 mg Oral Daily Damien Farris MD   20 mg at 07/24/22 0836    ferrous sulfate (IRON 325) tablet 325 mg  325 mg Oral Every Other Day Damien Farris MD   325 mg at 07/23/22 0925    levothyroxine (SYNTHROID) tablet 88 mcg  88 mcg Oral Daily Damien Farris MD   88 mcg at 07/24/22 6358    rivaroxaban (XARELTO) tablet 15 mg  15 mg Oral Daily Damien Farris MD   15 mg at 07/23/22 1722    miconazole (MICOTIN) 2 % powder   Topical BID Angel Ferris MD   Given at 07/24/22 0162       ALLERGIES: Latex and Tape Aditi Evelyn tape]    Review of Systems      VITALS:  Blood pressure 112/82, pulse 97, temperature 97.7 °F (36.5 °C), temperature source Oral, resp. rate 18, SpO2 97 %. There is no height or weight on file to calculate BMI.     Physical Exam    LABS:  Recent Results (from the past 24 hour(s))   Comprehensive Metabolic Panel w/ Reflex to MG    Collection Time: 07/24/22  5:00 AM   Result Value Ref Range    Sodium 141 135 - 144 mEq/L    Potassium reflex Magnesium 3.2 (L) 3.4 - 4.9 mEq/L    Chloride 99 95 - 107 mEq/L    CO2 30 20 - 31 mEq/L    Anion Gap 12 9 - 15 mEq/L    Glucose 118 (H) 70 - 99 mg/dL    BUN 14 8 - 23 mg/dL    Creatinine 0.56 0.50 - 0.90 mg/dL    GFR Non-African American >60.0 >60    GFR  >60.0 >60    Calcium 8.3 (L) 8.5 - 9.9 mg/dL    Total Protein 6.0 (L) 6.3 - 8.0 g/dL    Albumin 2.7 (L) 3.5 - 4.6 g/dL    Total Bilirubin 0.3 0.2 - 0.7 mg/dL    Alkaline Phosphatase 51 40 - 130 U/L    ALT 19 0 - 33 U/L    AST 20 0 - 35 U/L    Globulin 3.3 2.3 - 3.5 g/dL   CBC with Auto Differential    Collection Time: 07/24/22  5:00 AM   Result Value Ref Range    WBC 11.6 (H) 4.8 - 10.8 K/uL    RBC 3.92 (L) 4.20 - 5.40 M/uL    Hemoglobin 10.7 (L) 12.0 - 16.0 g/dL    Hematocrit 32.7 (L) 37.0 - 47.0 %    MCV 83.5 82.0 - 100.0 fL    MCH 27.3 27.0 - 31.3 pg    MCHC 32.7 (L) 33.0 - 37.0 %    RDW 14.2 11.5 - 14.5 %    Platelets 647 138 - 874 K/uL    PLATELET SLIDE REVIEW Adequate     Neutrophils % 83.0 %    Lymphocytes % 7.0 %    Monocytes % 5.8 %    Eosinophils % 3.7 %    Basophils % 1.0 %    Neutrophils Absolute 10.1 (H) 1.4 - 6.5 K/uL    Lymphocytes Absolute 0.8 (L) 1.0 - 4.8 K/uL    Monocytes Absolute 0.7 0.2 - 0.8 K/uL    Eosinophils Absolute 0.0 0.0 - 0.7 K/uL    Basophils Absolute 0.1 0.0 - 0.2 K/uL    Bands Relative 2 (L) 5 - 11 %    Metamyelocytes Relative 2 %    RBC Morphology Normal    Magnesium    Collection Time: 07/24/22  5:00 AM   Result Value Ref Range    Magnesium 1.7 1.7 - 2.4 mg/dL     Troponin:   Lab Results   Component Value Date/Time    TROPONINI <0.010 07/18/2022 12:30 PM ASSESSMENT:    History of paroxysmal atrial fibrillation  History of chronic diastolic congestive heart failure  History of moderate coronary disease  History of ascending and descending thoracic aortic aneurysms. History of SVT ablation  Essential hypertension  Diarrhea  Abdominal abscess status post surgery  Hypokalemia  Chronic right bundle branch block        PLAN:   As always, aggressive risk factor modification is strongly recommended. We should adhere to the JNC VIII guidelines for HTN management and the NCEPATP III guidelines for LDL-C management. Maximize cardiac medications  Change IV Lopressor to p.o. Coreg 6.25 mg home dose  Continue with Xarelto 50 mg p.o. daily. Monitor H&H periodically  Home Lasix dose on hold. Patient has diarrhea. Resume in future if needed  Maintain potassium between 3.5-4.5 and magnesium greater than 2  GI prophylaxis  Diarrhea management per primary care team  Further recommendations to follow    Thank you for allowing me to participate in the care of your patient, please don't hesitate to contact me if you have any further questions.       Electronically signed by Sawyer Martin DO on 7/24/2022 at 9:18 AM

## 2022-07-24 NOTE — PLAN OF CARE
Problem: Discharge Planning  Goal: Discharge to home or other facility with appropriate resources  7/23/2022 2328 by Loreto Topete RN  Outcome: Progressing  7/23/2022 1602 by Lisandro Ordonez RN  Outcome: Progressing  7/23/2022 0953 by Enzo Mohr RN  Outcome: Progressing     Problem: Safety - Adult  Goal: Free from fall injury  7/23/2022 2328 by Loreto Topete RN  Outcome: Progressing  7/23/2022 1602 by Lisandro Ordonez RN  Outcome: Progressing  7/23/2022 0953 by Enzo Mohr RN  Outcome: Progressing     Problem: ABCDS Injury Assessment  Goal: Absence of physical injury  7/23/2022 2328 by Loreto Topete RN  Outcome: Progressing  7/23/2022 1602 by Lisandro Ordonez RN  Outcome: Progressing  7/23/2022 0953 by Enzo Mohr RN  Outcome: Progressing     Problem: Skin/Tissue Integrity  Goal: Absence of new skin breakdown  Description: 1. Monitor for areas of redness and/or skin breakdown  2. Assess vascular access sites hourly  3. Every 4-6 hours minimum:  Change oxygen saturation probe site  4. Every 4-6 hours:  If on nasal continuous positive airway pressure, respiratory therapy assess nares and determine need for appliance change or resting period. 7/23/2022 2328 by Loreto Topete RN  Outcome: Progressing  7/23/2022 1602 by Lisandro Ordonez RN  Outcome: Progressing  7/23/2022 0953 by Enzo Mohr RN  Outcome: Progressing     Problem: SLP Adult - Impaired Swallowing  Goal: By Discharge: Advance to least restrictive diet without signs or symptoms of aspiration for planned discharge setting. See evaluation for individualized goals.   7/23/2022 1136 by SOBIA Talamantes  Outcome: Completed  7/23/2022 1136 by SOBIA Talamantes  Outcome: Progressing

## 2022-07-25 LAB
ANION GAP SERPL CALCULATED.3IONS-SCNC: 8 MEQ/L (ref 9–15)
BUN BLDV-MCNC: 13 MG/DL (ref 8–23)
CALCIUM SERPL-MCNC: 8.7 MG/DL (ref 8.5–9.9)
CHLORIDE BLD-SCNC: 103 MEQ/L (ref 95–107)
CO2: 30 MEQ/L (ref 20–31)
CREAT SERPL-MCNC: 0.52 MG/DL (ref 0.5–0.9)
GFR AFRICAN AMERICAN: >60
GFR NON-AFRICAN AMERICAN: >60
GLUCOSE BLD-MCNC: 114 MG/DL (ref 70–99)
POTASSIUM SERPL-SCNC: 3.8 MEQ/L (ref 3.4–4.9)
SODIUM BLD-SCNC: 141 MEQ/L (ref 135–144)

## 2022-07-25 PROCEDURE — 88305 TISSUE EXAM BY PATHOLOGIST: CPT

## 2022-07-25 PROCEDURE — 80048 BASIC METABOLIC PNL TOTAL CA: CPT

## 2022-07-25 PROCEDURE — 6370000000 HC RX 637 (ALT 250 FOR IP): Performed by: PHYSICAL MEDICINE & REHABILITATION

## 2022-07-25 PROCEDURE — 2700000000 HC OXYGEN THERAPY PER DAY

## 2022-07-25 PROCEDURE — 6370000000 HC RX 637 (ALT 250 FOR IP): Performed by: NURSE PRACTITIONER

## 2022-07-25 PROCEDURE — 2580000003 HC RX 258: Performed by: INTERNAL MEDICINE

## 2022-07-25 PROCEDURE — 6370000000 HC RX 637 (ALT 250 FOR IP): Performed by: INTERNAL MEDICINE

## 2022-07-25 PROCEDURE — 6360000002 HC RX W HCPCS: Performed by: INTERNAL MEDICINE

## 2022-07-25 PROCEDURE — 97530 THERAPEUTIC ACTIVITIES: CPT

## 2022-07-25 PROCEDURE — 1180000000 HC REHAB R&B

## 2022-07-25 PROCEDURE — 99233 SBSQ HOSP IP/OBS HIGH 50: CPT | Performed by: PHYSICAL MEDICINE & REHABILITATION

## 2022-07-25 PROCEDURE — 97535 SELF CARE MNGMENT TRAINING: CPT

## 2022-07-25 PROCEDURE — 97110 THERAPEUTIC EXERCISES: CPT

## 2022-07-25 PROCEDURE — 36415 COLL VENOUS BLD VENIPUNCTURE: CPT

## 2022-07-25 PROCEDURE — 88112 CYTOPATH CELL ENHANCE TECH: CPT

## 2022-07-25 PROCEDURE — 97116 GAIT TRAINING THERAPY: CPT

## 2022-07-25 RX ADMIN — PIPERACILLIN AND TAZOBACTAM 3375 MG: 3; .375 INJECTION, POWDER, LYOPHILIZED, FOR SOLUTION INTRAVENOUS at 21:06

## 2022-07-25 RX ADMIN — LACTOBACILLUS TAB 2 TABLET: TAB at 09:03

## 2022-07-25 RX ADMIN — CARVEDILOL 6.25 MG: 6.25 TABLET, FILM COATED ORAL at 17:34

## 2022-07-25 RX ADMIN — Medication 10 ML: at 21:51

## 2022-07-25 RX ADMIN — MICONAZOLE NITRATE: 2 POWDER TOPICAL at 09:06

## 2022-07-25 RX ADMIN — PIPERACILLIN AND TAZOBACTAM 3375 MG: 3; .375 INJECTION, POWDER, LYOPHILIZED, FOR SOLUTION INTRAVENOUS at 04:22

## 2022-07-25 RX ADMIN — PANTOPRAZOLE SODIUM 40 MG: 40 TABLET, DELAYED RELEASE ORAL at 05:41

## 2022-07-25 RX ADMIN — CITALOPRAM HYDROBROMIDE 20 MG: 10 TABLET ORAL at 09:04

## 2022-07-25 RX ADMIN — LACTOBACILLUS TAB 2 TABLET: TAB at 20:50

## 2022-07-25 RX ADMIN — FERROUS SULFATE TAB 325 MG (65 MG ELEMENTAL FE) 325 MG: 325 (65 FE) TAB at 09:03

## 2022-07-25 RX ADMIN — SODIUM CHLORIDE, PRESERVATIVE FREE 10 ML: 5 INJECTION INTRAVENOUS at 17:40

## 2022-07-25 RX ADMIN — MICONAZOLE NITRATE: 2 POWDER TOPICAL at 21:10

## 2022-07-25 RX ADMIN — CARVEDILOL 6.25 MG: 6.25 TABLET, FILM COATED ORAL at 09:03

## 2022-07-25 RX ADMIN — LACTOBACILLUS TAB 2 TABLET: TAB at 16:55

## 2022-07-25 RX ADMIN — Medication 5 ML: at 09:04

## 2022-07-25 RX ADMIN — LEVOTHYROXINE SODIUM 88 MCG: 88 TABLET ORAL at 09:03

## 2022-07-25 RX ADMIN — PIPERACILLIN AND TAZOBACTAM 3375 MG: 3; .375 INJECTION, POWDER, LYOPHILIZED, FOR SOLUTION INTRAVENOUS at 12:55

## 2022-07-25 RX ADMIN — RIVAROXABAN 15 MG: 15 TABLET, FILM COATED ORAL at 17:34

## 2022-07-25 NOTE — PLAN OF CARE
Call to the [facility] regarding the drain placement family would was wondering if we could remove the drain. Uresil accordian style drainage bag connected. Drain must be emptied every shift. When 24 hours without drainage we can have Dr. Suhail Lovell remove the drain. Family was concern regarding the cardiologist and medications. Dr. Angie Higgins evaluate the pt. 7/24/22. Pt. Has a closed drainage system. 125 serosanguinous drainage noted. Serous noted in the tube. Unable to drain. Will continue to document output each shift. Per report this morning there was 75 ml. Of serosanguinous drainage.

## 2022-07-25 NOTE — PROGRESS NOTES
Physical Therapy Rehab Treatment Note  Facility[facility]  [facility]       NAME: Naga Alonso  : 1932 (80 y.o.)  MRN: [id number]  CODE STATUS: Full Code    Date of Service: 2022     Restrictions:  Restrictions/Precautions: Fall Risk  Position Activity Restriction  Other position/activity restrictions: Abdominal drain     SUBJECTIVE:   Subjective: Pt reports her legs are weak. Pt reports she wears AFOs when she goes out. Pain  Pain: Pt reports pain in stomach. Unable to rate pain #. OBJECTIVE:         Bed mobility  Rolling to Left: Contact guard assistance  Rolling to Right: Contact guard assistance  Sit to Supine: Contact guard assistance  Bed Mobility Comments: Bed flat with rails. Requied assist to position to [facility] once supine. Transfers  Sit to Stand: Contact guard assistance  Stand to sit: Contact guard assistance  Bed to Chair: Contact guard assistance  Comment: VCs and demonstration for safety and technique. Ambulation  WB Status: wbat  Ambulation  Surface: level tile;carpet  Device: Rolling Walker  Other Apparatus: O2;AFO; Left;Right  Assistance: Contact guard assistance  Quality of Gait: Assist required for management of O2 tubing. Gait Deviations: Slow Lea;Decreased step length  Distance: 30ft  Comments: Fatigues quickly. 2nd person to assist with equiptment (IV pole and O2 tank). PT Exercises  A/AROM Exercises: seated march 2x10, hip add with ball x20, hip abs YTB x20, bridges x8        ASSESSMENT/PROGRESS TOWARDS GOALS:   Body Structures, Functions, Activity Limitations Requiring Skilled Therapeutic Intervention: Decreased functional mobility ; Decreased strength;Decreased posture;Decreased balance;Decreased endurance;Decreased safe awareness  Assessment: Pt tolerated increased gait distance. Fatigues quickly requiring rest breaks. Cues for safety with transfers with decreased follow through.     Goals:  Long Term Goals  Long term goal 1: Bed mobility with indep  Long term goal 2: Functional transfers with indep  Long term goal 3: Amb 50ft with LRAD and indep  Long term goal 4: 2 steps with handrail with supervision to enter/exit home  Long term goal 5: 5xSTS <18 seconds to demonstrate decreased fall risk  Patient Goals   Patient goals : does not state, family wishes pt to regain her indep    PLAN OF CARE/Safety:   Plan Comment: Cont per POC      Therapy Time:   Individual   Time In 1500   Time Out 1535   Minutes 35     Minutes:  Transfer/Bed mobility training:10  Gait training:10  Neuro re education:0  Therapeutic ex:15      Kenney Chaudhary PTA, 07/25/22 at 3:46 PM

## 2022-07-25 NOTE — PROGRESS NOTES
Assessment completed. VSS. Denies pain. LBM 7/24. Given immodium for diarrhea which has been effective. Stool specimen sent for C-diff and was resulted as negative. Accordian drain in place to RUQ- bloody drainage present in bag. Dressing to area c/d/I. Continues IV zosyn with no current stop date. K+ 3.2- replaced by IV supplement. Was on IV lopressor for HR management, that was D/c'd and coreg was added on  per cardiology. No distress noted. Call light within reach and bed alarm activated. Electronically signed by Yanira Mcmahon RN on 7/24/2022 at 10:44 PM    There is approximately 75cc of bloody drainage in drainage bag. This amount has not changed since observing drain with another RN night prior. Electronically signed by Yanira Mcmahon RN on 7/25/2022 at 4:43 AM    Patient incontinent of bowel this AM. Soft but formed. Zinc paste applied to buttocks d/t excoriated inner buttocks secondary to incontinence. Electronically signed by Yanira Mcmahon RN on 7/25/2022 at 6:33 AM    K+ 3.8 today.   Electronically signed by Yanira Mcmahon RN on 7/25/2022 at 6:38 AM

## 2022-07-25 NOTE — PROGRESS NOTES
INDIVIDUALIZED OVERALL REHAB PLAN OF CARE  ADDENDUM TO REHAB PROGRESS NOTE-for audit purposes must also refer to this day's clinical note and combine the information      Date: 2022  Patient Name: Landy Jensen   Room: F909/A873-08    MRN: [id number]    : 1932  (80 y.o.)  Gender: female       Today 2022 during weekly team meeting, I reviewed the patient Landy Jensen in detail with the therapists and nurses involved in patient's care gathering complex physiatric data regarding current medical issues, progress in therapies, factors limiting progress, social issues, psychological issues, ongoing therapeutic plans and discharge planning. Legend:  I= independent Im =Modified independent  S=Supervised SB=stand by MAGANA=set up CG=contact fran Min= minimal Mod=Moderate Max=maximal Max of 2 =maximal assist of 2 people      CURRENT FUNCTIONAL STATUS:    Barriers to progress and discharge complex medical conditions      NURSING ISSUES:     homemaker--2 children--dtr Paul Jones lives in [place]--son lives locally--VSS. Denies pain. LBM . Given immodium for diarrhea which has been effective. Stool specimen sent for C-diff and was resulted as negative. Accordian drain in place to RUQ- bloody drainage present in bag. Dressing to area c/d/I. Continues IV zosyn with no current stop date. K+ 3.2- replaced by IV supplement. Was on IV lopressor for HR management, that was D/c'd and coreg was added on  per cardiology. No distress noted. Call light within reach and bed alarm activated. There is approximately 75cc of bloody drainage in drainage bag. This amount has not changed since observing drain with another RN night prior. Patient incontinent of bowel this AM. Soft but formed. Zinc paste applied to buttocks d/t excoriated inner buttocks secondary to incontinence. K+ 3.8 today. Nursing will continue to focus on bowel and bladder continence transitioning toward independence by time of discharge. Monitoring post void residuals monitoring for severe constipation and bowel obstruction. Heme/onc--consulted OBGYN re ro ovarian CA. Family to bring in AFOs from home. Per family they would like cardiology on the case--Dr Franco saw her yesterday. Bowel function- constipation  Plans to address- teach spinal cord style bowel program     Bladder function-  continent    Plans to address- schedule voids before bed and therapy     Skin deficits- skin tears   Plans to address- pressure relief     Hydration/Nutritional deficits- monitoring for dysphagia  Plans to address-Push PO, assist with feeds as needed     Low BP- continue to monitor Ortho BPs  Plans to address- check ortho BPs before therapies-and use abdominal binder and TEDs as needed    Pt and Family training goals-  teach home technology options like Pretty use and home assistive devices      Focus on achieving ADL goals with co-treating with OT when possible. Focus on cognition and co treat with SLP when possible. PHYSICAL THERAPY  Bed mobility:  Bed mobility  Rolling to Left: Contact guard assistance (07/25/22 0934)  Rolling to Right: Contact guard assistance (07/25/22 0934)  Supine to Sit: Minimal assistance (07/25/22 0934)  Sit to Supine: Moderate assistance (07/23/22 1142)  Bed Mobility Comments: Increased time to complete. Hand rail utilized. VCs to complete.   Pt reports dizziness initially when sitting up. (07/25/22 0934)  Transfers:  Transfers  Sit to Stand: Contact guard assistance (07/25/22 0934)  Stand to sit: Contact guard assistance (07/25/22 0934)  Bed to Chair: Contact guard assistance (07/25/22 0934)  Stand Pivot Transfers: Minimal Assistance (with 2ww) (07/23/22 1221)  Car Transfer: Unable to assess (07/23/22 1221)  Comment: VCs and demonstration for safety and technique. (07/25/22 0934)  Transfer Training  Transfer Training: Yes (07/23/22 1413)  Overall Level of Assistance: Contact-guard assistance;Minimum assistance (07/23/22 1413)  Interventions: Tactile cues (07/23/22 1413)  Sit to Stand: Minimum assistance;Contact-guard assistance (07/23/22 1413)  Stand to Sit: Minimum assistance;Contact-guard assistance (07/23/22 1413)  Transfers  Surface: To chair without arms (07/23/22 1145)  Additional Factors: Hand placement cues (07/23/22 1145)  Device: Kian Janus (07/23/22 1145)  Sit to Stand  Assistance Level: Contact guard assist (07/23/22 1145)  Skilled Clinical Factors: cues for hand placement (07/23/22 1145)  Stand to Sit  Assistance Level: Contact guard assist;Minimal assistance (07/23/22 1145)  Gait:   Ambulation  WB Status: wbat (07/25/22 0936)  Ambulation  Surface: level tile (07/25/22 0936)  Device: Rolling Walker (07/25/22 0567)  Other Apparatus: O2;AFO; Left;Right (3L) (07/25/22 9791)  Assistance: Minimal assistance (07/25/22 0936)  Quality of Gait: clayton foot drop (CharcoMarieTooth), slow no lob, wc follow for safety as she fatigues easily (07/23/22 1354)  Gait Deviations: Slow Lea;Decreased step length (07/25/22 0936)  Distance: 20ft (07/25/22 0936)  Comments: Donned pt's B AFOs. Fatigues quickly. Amb to destination without WC follow. (07/25/22 0936)  More Ambulation?: No (07/23/22 1354)  Gait Training: Yes (07/23/22 1413)  Overall Level of Assistance: Contact-guard assistance;Minimum assistance (07/23/22 1413)  Ambulation  Surface: Level surface (07/23/22 1425)  Stairs:     W/C mobility:           Pt and Family training goals-  Focus on sequencing and endurance        OCCUPATIONAL THERAPY         Plans for addressing ADL deficits affecting: Focus on sequencing.         Bladder function disrupting functional progress- continent      Plans to address- coordinate scheduled voids before bed and therapy with nursing     Skin deficits- no problems noted   Plans to address- coordinate patient dressing changes education with nursing as part of ADL training                SPEECH THERAPY/SLP             Plans for cognitive and communication Violence: Not on file   Housing Stability: Not on file           THERAPY, MEDICAL AND NURSING COORDINATION:    [x]  Pain medication before therapies     [x]  Check orthostatic BP and monitor heart rate and medications effects with therapy      [x]  Ambulate to the bathroom in room    [x]  Add scheduled rest beaks     [x]  In room therapies as needed      Discharge date set for:              8/5/22      Home with:   alone  with help from   son and DIL            And:      Home Health Care:     [x]  PT    [x]  OT       [x]  Aide   [x]  SW    [x]  RN                       Equipment:  [employer]      At D/C their function is goaled at:   PT:Long term goal 1: Bed mobility with indep  Long term goal 2: Functional transfers with indep  Long term goal 3: Amb 50ft with LRAD and indep  Long term goal 4: 2 steps with handrail with supervision to enter/exit home  Long term goal 5: 5xSTS <18 seconds to demonstrate decreased fall risk  OT:Eating  Assistance Needed: Setup or clean-up assistance  CARE Score: 5  Discharge Goal: Independent, Oral Hygiene  Assistance Needed: Setup or clean-up assistance  CARE Score: 5  Discharge Goal: Independent, [address] needed: Dependent  CARE Score: 1  Discharge Goal: Independent, Shower/Bathe Self  Assistance Needed: Partial/moderate assistance  Comment: assist with buttocks, B lower legs and feet  CARE Score: 3  Discharge Goal: Independent  Upper Body Dressing  Assistance Needed: Setup or clean-up assistance  CARE Score: 5  Discharge Goal: Independent, Lower Body Dressing  Comment: pt declined to don pants, anticpiate Max A  Reason if not Attempted: Not applicable  CARE Score: 9  Discharge Goal: Independent, Putting On/Taking Off Footwear  Assistance Needed: Dependent  CARE Score: 1  Discharge Goal: Independent, Toilet Transfer  Comment: pt brief soiled in bed  Reason if not Attempted: Not applicable  CARE Score: 9  SP:               From a cognitive standpoint they will need: 24 hr supervision  --progress to occasional              Significant problems/ barriers to functional progress include: Pt is at a high risk for functional loss,  occ diarrhea     [x]  Acute infection/UTI    []  Low BP's     []  COPD flare-up   []  Uncontrolled blood sugar     []  Progressive anemia     [x]  poor endurance    [x]  Severe pain exacerbation     [x]  Impaired mental status and [place]    []  Urinary incontinence    []  Bowel incontinence           Plan to correct barriers to functional progress: Add scheduled rest breaks, CoQ 10 and Vit B 12, control pain by using ice Lidoderm rest and massage as well as pain medications prior to therapy. Spread therapy of 15 hours out over a 7 day window to accommodate rest breaks and medical interventions. Patient seems to be making fair to good response to these interventions. Based on a comprehensive evaluation of the above, the individualized therapy and Discharge plan will be:    -Times stated are an average that will be varied based on the patient's daily need. PT    1 1/2  hrs/day 5-7 days per week      OT    1 1/2 hrs per day 5-7 days per week     ST     1/2    hrs /day 3-5 days per week       Estimated LOS   2 week(s)    - Overall functional prognosis:     [x]  Good  to  [x]  Fair    []  Poor -Medical Prognosis:   [x]  Good    []  Fair    []  Poor    This patient was made aware of the discussion of Plan of Care, their projected dicharge date and their projected function at discharge.          Judith Learn, DO

## 2022-07-25 NOTE — PROGRESS NOTES
OCCUPATIONAL THERAPY  INPATIENT REHAB TREATMENT NOTE  [facility] [place]      NAME: Jake Wilson  : 1932 (80 y.o.)  MRN: [id number]  CODE STATUS: Full Code  Room: R250/R250-01    Date of Service: 2022    Referring Physician: Dr. 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 Woman is smart. \"     Pain at start of treatment:  No 0/10    Pain at end of treatment:   No 0/10    COGNITION:  Orientation  Overall Orientation Status: Within Functional Limits  Cognition  Overall Cognitive Status: Exceptions  Cognition Comment: Comp: Supervision, Exp: IND, Social: IND, Prob: Min A, Mem: Supervision    OBJECTIVE:    Treatment session began 5 minutes late 2° patient insisting all footwear donned prior to leaving room with transport and therapist taking previous patient back to room due to no other therapists or techs in therapy suite. Putting On/Taking Off Footwear  Assistance Level: Dependent  Skilled Clinical Factors: B socks/shoes/AFO's    FM Coordination:  Patient engaged in B FM coordination and strengthening to increase I with fasteners and small objects for ADL's and IADL's. Patient able to  pennies from table top with MAX difficulty. Patient able to stack pennies in 10's with MOD difficulty. Patient able to  stacked pennies with MAX difficulty. Patient able to place pennies 1 at a time in slotted container with MAX difficulty with in hand manipulation. ASSESSMENT: Patient worked at a steady pace.     Activity Tolerance: Patient

## 2022-07-25 NOTE — CARE COORDINATION
Spoke with the patient and her daughter to discuss her dc date planned for 8/5/2022 to home. Discussed with her daughter that some of her goals are at a supervised level and she stated that the patient does not wear shoes at home and if she does her brother and his wife help ready her to go out. They put her AFO on and so forth. Will touch base in future for family training and Yadiel  which may be needed at DC.   Electronically signed by Sergey Servin RN on 7/25/22 at 6:10 PM EDT

## 2022-07-25 NOTE — CARE COORDINATION
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    CO MORBIDITIES:      Past Medical History:   Diagnosis Date    Ascending aortic aneurysm ([address] 2017    Charcot Arleen Tooth muscular atrophy     CHF (congestive heart failure) (HCC)     Chronic diastolic CHF (congestive heart failure) ([address] 2022    Depression     Descending aortic aneurysm ([address] 2017    Essential hypertension 2018    Headache     HTN (hypertension)     Impaired mobility and activities of daily living     Lumbar stenosis with neurogenic claudication     Myelopathy ([address]     Osteoarthritis      Past Surgical History:   Procedure Laterality Date    JOINT REPLACEMENT Bilateral     knees    OTHER SURGICAL HISTORY Right 2022    cat scan guided abdominal mass aspiration with drain placement by Dr. Pedro Matthews Left 2021    LEFT PLEURAL CATHETER INSERTION performed by Omar Crowe MD at [place] [id number] Left 2021    total of 755 cc removed per Dr Ann Corners specimen sent to lab        Restrictions  Restrictions/Precautions: Fall Risk  Position Activity Restriction  Other position/activity restrictions: Abdominal drain  CASE MANAGEMENT    Social/Functional History  Social/Functional History  Lives With: Alone  Type of Home: House  Home Layout: One level  Home Access: Stairs to enter with rails  Entrance Stairs - Number of Steps: 2  Entrance Stairs - Rails: Both  Bathroom Shower/Tub: Tub/Shower unit  Bathroom Equipment: Grab bars in shower  Home Equipment: Cane (B AFOs)  Has the patient had two or more falls in the past year or any fall with injury in the past year?: No  ADL Assistance: Independent  Homemaking Assistance: Independent  Homemaking Responsibilities: Yes  Meal Prep Responsibility: Primary  Laundry Responsibility: Primary  Cleaning Responsibility: Primary  Shopping Responsibility: No (Son performs)  Ambulation Assistance: Independent (furniture walking and cane prn)  Transfer Assistance: Independent  Active : No  Patient's  Info: family  Additional Comments: Social-functional information collected through chart review and dtr able to confirm       Pts personal preferences: Likes her family to interpret for her as she is [place] and can't tolerate or hear the device     Pts assets/resources/support system: Supportive family son and DIL are local and retired    COVERAGE INFORMATION:Payor: MEDICARE / Plan: MEDICARE PART A AND B / Product Type: *No Product type* /       NURSING  No active isolations      / There is no height or weight on file to calculate BMI. ADULT DIET; Regular  ADULT ORAL NUTRITION SUPPLEMENT; PM Snack, Lunch, Dinner; Standard High Calorie/High Protein Oral Supplement    SpO2: 94 % (07/25/22 0600)  O2 Flow Rate (L/min): 3 L/min (07/24/22 0745)    Oxygen to be continued upon discharge: Yes  Home-going needs (nocturnal Pox, sleep study, RX, equipment): Yes    Skin Issues: Yes  Home-going needs (education, training, RX, Wound RN): Yes  Skin care prevention  Patient also has a drain  Pain Managed: Yes    Bladder continence: Yes  Discharging with Taylor: No   Training done: No   Urology following: No   Plan/date to remove taylor: NA   Bladder retraining started: No    Bowel continence:  Yes  Last BM date: 7/25  Need for bowel program: No    Anticoagulants: Xeralto  Home-going needs (Education, labs):  Yes    Antibiotics: Zosyn  Stop date: Unknown need ID consult  Home-going needs (education, RX, PICC): Yes      Other: Patient to be seen by Gynecology and ID  Consult to [facility] IR because of her drain (will get samples of fluid for Cytology)        PHYSICAL THERAPY  Bed mobility:  Bed mobility  Rolling to Left: Contact guard assistance (07/25/22 0934)  Rolling to Right: Contact guard assistance (07/25/22 0934)  Supine to Sit: Minimal assistance (07/25/22 0934)  Sit to Supine: Moderate assistance (07/23/22 1142)  Bed Mobility Comments: Increased time to complete. Hand rail utilized. VCs to complete. Pt reports dizziness initially when sitting up. (07/25/22 0934)  Transfers:  Transfers  Sit to Stand: Contact guard assistance (07/25/22 0934)  Stand to sit: Contact guard assistance (07/25/22 0934)  Bed to Chair: Contact guard assistance (07/25/22 0934)  Stand Pivot Transfers: Minimal Assistance (with 2ww) (07/23/22 1221)  Car Transfer: Unable to assess (07/23/22 1221)  Comment: VCs and demonstration for safety and technique. (07/25/22 0934)  Transfer Training  Transfer Training: Yes (07/23/22 1413)  Overall Level of Assistance: Contact-guard assistance;Minimum assistance (07/23/22 1413)  Interventions: Tactile cues (07/23/22 1413)  Sit to Stand: Minimum assistance;Contact-guard assistance (07/23/22 1413)  Stand to Sit: Minimum assistance;Contact-guard assistance (07/23/22 1413)  Transfers  Surface: To chair without arms (07/23/22 1145)  Additional Factors: Hand placement cues (07/23/22 1145)  Device: [employer] (07/23/22 1145)  Sit to Stand  Assistance Level: Contact guard assist (07/23/22 1145)  Skilled Clinical Factors: cues for hand placement (07/23/22 1145)  Stand to Sit  Assistance Level: Contact guard assist;Minimal assistance (07/23/22 1145)  Gait:   Ambulation  WB Status: wbat (07/25/22 0936)  Ambulation  Surface: level tile (07/25/22 0936)  Device: Rolling Walker (07/25/22 7419)  Other Apparatus: O2;AFO; Left;Right (3L) (07/25/22 9381)  Assistance: Minimal assistance (07/25/22 0936)  Quality of Gait: clayton foot drop (CharcoMarieTooth), slow no lob, wc follow for safety as she fatigues easily (07/23/22 2224)  Gait Deviations: Slow Lea;Decreased step length (07/25/22 2014)  Distance: 20ft (07/25/22 0715)  Comments: Donned pt's B AFOs. Fatigues quickly. Amb to destination without WC follow. (07/25/22 0936)  More Ambulation?: No (07/23/22 1354)  Gait Training: Yes (07/23/22 1413)  Overall Level of Assistance: Contact-guard assistance;Minimum assistance (07/23/22 1413)  Ambulation  Surface: Level surface (07/23/22 1425)  Stairs:     W/C mobility:     LTG:  Long term goal 1: Bed mobility with indep  Long term goal 2: Functional transfers with indep  Long term goal 3: Amb 50ft with LRAD and indep  Long term goal 4: 2 steps with handrail with supervision to enter/exit home  Long term goal 5: 5xSTS <18 seconds to demonstrate decreased fall risk  PT Treatment Time:  1.5 hrs      OCCUPATIONAL THERAPY    EVALUATION SELF CARE STATUS:  Hand Dominance: Right  Feeding: Setup (07/23/22 1233)  Grooming: Setup (07/23/22 1233)  Grooming Skilled Clinical Factors: pt supine in bed with HOB elevated (07/23/22 1233)  UE Bathing: Setup;Verbal cueing; Increased time to complete (07/23/22 1233)  LE Bathing: Moderate assistance (07/23/22 1233)  LE Bathing Skilled Clinical Factors: pt able to wash top of legs, assist for lower leg, B feet, and buttocks (07/23/22 1233)  UE Dressing: Setup; Increased time to complete (07/23/22 1233)  LE Dressing: Dependent/Total (07/23/22 1233)  LE Dressing Skilled Clinical Factors: Dep to doff/don socks, pt declined pants d/t report of increased diarrhea (07/23/22 1233)  Toileting: Dependent/Total (07/23/22 1233)  Toileting Skilled Clinical Factors: Dep to change breif and complete hygiene in bed (07/23/22 1233)             CURRENT SELF CARE:           LTG:  Eating  Assistance Needed: Setup or clean-up assistance  CARE Score: 5  Discharge Goal: Independent, Oral Hygiene  Assistance Needed: Setup or clean-up assistance  CARE Score: 5  Discharge Goal: Independent, [address] needed: Dependent  CARE Score: 1  Discharge Goal: Independent, Shower/Bathe Self  Assistance Needed: Partial/moderate assistance  Comment: assist with buttocks, B lower legs and feet  CARE Score: 3  Discharge Goal: Independent  Upper Body Dressing  Assistance Needed: Setup or clean-up assistance  CARE Score: 5  Discharge Goal: Independent, Lower Body Dressing  Comment: pt declined to don pants, anticpiate Max A  Reason if not Attempted: Not applicable  CARE Score: 9  Discharge Goal: Independent, Putting On/Taking Off Footwear  Assistance Needed: Dependent  CARE Score: 1  Discharge Goal: Independent, Toilet Transfer  Comment: pt brief soiled in bed  Reason if not Attempted: Not applicable  CARE Score: 9  OT Treatment Time: 1.5 hrs      SPEECH THERAPY                 Diet/Swallow:        Dysphagia Outcome Severity Scale: Level 6: Within functional limits/Modified independence    Compensatory Swallowing Strategies : Alternate solids and liquids, Eat/Feed slowly, Upright as possible for all oral intake, Small bites/sips         LTG:                COGNITION  OT: Cognition Comment: Comp: Supervision, Exp: Supervision, Social: IND, Prob: Min A, Mem: Supervision  SP:        RECREATIONAL THERAPY  Attendance to recreational therapy programs:    []  Pet Therapy  [] Music Therapy  [] Art Therapy    [] Recreation Therapy Group [] Support Group           Patient social interaction (mood, participation): Patient has pleasant mood and is participating thus far    Patient strengths: Supportive family, Patient is motivated    Patients goal: Go back home    Problems/Barriers: Patient limited by pain at times and fatigue, The patient is [facility] and declines the need for HA, Patient lives alone and may require some assist at home some goals will be at supervised level        1. Safety:          - Intervention / Plan:    [x]  falls protocol     [x]  PT/OT    [x]  SP        - Results:         2.  Potential DME needs:         - Intervention / Plan:  [x]  PT/OT     [x]  Assess equipment needs/access - Results:         3. Weakness:          - Intervention / Plan:  [x]  PT/OT      []  Other:         - Results:         4. Discharge planning needs:          - Intervention / Plan:  [x]  Weekly team conference      [x]  family training        - Results:         5.            - Intervention / Plan:          - Results:         6.            - Intervention / Plan:         - Results:         7.            - Intervention / Plan:         - Results:           Discharge Plan   Estimated Length of Stay: 16 yays    Tentative Discharge date: Dct o home 8/5/2022      Anticipated Discharge Destination:  Home      Team recommendations:    1. Follow up Therapy :    PT  OT  RN  Social Work  [facility]    2. Home Health    Other:     Equipment needed at Discharge:  Other: to be determined      Team Members Present at Conference:    Physician: Dr. Laura Bonds  : Kaiden Roth RN  RN: Ct Queen RN  Physical Therapist: Guerline Nguyen, PT  Occupational Therapist: Marcella Garza OTR  Speech Therapist: Erasmo Garcia SLP  Electronically signed by Harpal Cardoza RN on 7/25/22 at 6:07 PM EDT

## 2022-07-25 NOTE — PROGRESS NOTES
ST spoke with Dr. Erick Piper and patient's family member. ST also spoke at team meeting with fellow therapists about patient's cognitive skills. After further discussion Dr. Erick Piper deferred SLE at this time secondary to suspecting patient skills are baseline. Please notify ST if concerns arise related to cognition and safety during Ad[facility].      Electronically signed by SOBIA Nassar on 7/25/2022 at 2:41 PM

## 2022-07-25 NOTE — PROGRESS NOTES
OCCUPATIONAL THERAPY  INPATIENT REHAB TREATMENT NOTE  [facility]      NAME: Lucie Torres  : 1932 (80 y.o.)  MRN: [id number]  CODE STATUS: Full Code  [facility]    Date of Service: 2022    Referring Physician: Dr. 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 \"     Pain at start of treatment:  No 0/10    Pain at end of treatment:   No 0/10    COGNITION:  Orientation  Overall Orientation Status: Within Functional Limits  Cognition  Overall Cognitive Status: Exceptions  Cognition Comment: Comp: Supervision, Exp: IND, Social: IND, Prob: Min A, Mem: Supervision    OBJECTIVE:    Grooming/Oral Hygiene  Assistance Level: Set-up  Upper Extremity Bathing  Assistance Level: Set-up  Skilled Clinical Factors: Patient completed sponge bath seated in w/c at bathrooom sink  Lower Extremity Bathing  Assistance Level: Moderate assistance  Skilled Clinical Factors: buttocks; B feet  Upper Extremity Dressing  Assistance Level: Set-up  Skilled Clinical Factors: shirt and hospital gown  Lower Extremity Dressing  Assistance Level: Dependent  Skilled Clinical Factors: 0 pants - hospital brief  Putting On/Taking Off Footwear  Assistance Level: Dependent  Skilled Clinical Factors: B socks/shoes/AFO's  Toileting  Assistance Level: Maximum assistance; Increased time to complete  Skilled Clinical Factors: assist for clothing management  Toilet Transfers  Technique: Stand step  Equipment: Standard toilet;Grab bars  Assistance Level: Contact guard assist  Skilled Clinical Factors: w/c    ASSESSMENT: Patient pleasant this session. Activity Tolerance: Patient tolerated treatment well      PLAN OF CARE:    Continue per OT POC    Patient goals : \"To be able to stand up and move around on my own. \"  Long Term Goal 1: Pt will increased BUE stength by 1/2 MMT grade. Long Term Goal 2: Pt will be IND with ECWS techniques. Long Term Goal 3: Pt will be Mod IND with LB dressing. Long Term Goal 4: Pt will be Mod IND for functional mobility.         Therapy Time:   Individual Group Co-Treat   Time In 1000       Time Out 1100         Minutes 60                   ADL/IADL trainin minutes     Electronically signed by:    PRUDENCE Olivo,   2022, 11:45 AM

## 2022-07-25 NOTE — PROGRESS NOTES
Physical Therapy Rehab Treatment Note  [facility]  [facility]       NAME: Talya Fontana  : 1932 (80 y.o.)  MRN: [id number]  CODE STATUS: Full Code    Date of Service: 2022       Restrictions:  Restrictions/Precautions: Fall Risk  Position Activity Restriction  Other position/activity restrictions: Abdominal drain    SUBJECTIVE:   Subjective: Pt reports her legs are weak. Pt reports she wears AFOs when she goes out. Pain  Pain: Pt denies pain prior to session. Reports pain pointing to R side where draining \"not to much\"    OBJECTIVE:  ipad previously unsuccessful d/t [place]. Pt able to follow basic instructions without. Bed mobility  Rolling to Left: Contact guard assistance  Rolling to Right: Contact guard assistance  Supine to Sit: Minimal assistance  Bed Mobility Comments: Increased time to complete. Hand rail utilized. VCs to complete. Pt reports dizziness initially when sitting up. Transfers  Sit to Stand: Contact guard assistance  Stand to sit: Contact guard assistance  Bed to Chair: Contact guard assistance  Comment: VCs and demonstration for safety and technique. Ambulation  WB Status: wbat  Ambulation  Surface: level tile  Device: Rolling Walker  Other Apparatus: O2;AFO; Left;Right (3L)  Assistance: Minimal assistance  Quality of Gait: Assist required for management of O2 tubing. Gait Deviations: Slow Lea;Decreased step length  Distance: 20ft  Comments: Donned pt's B AFOs. Fatigues quickly. Amb to destination without WC follow. PT Exercises  A/AROM Exercises: seated LAQs, march, hip add/abd 2x10 ea with manual resistance  Functional Mobility Circuit Training: STS x3 (Attempted 5xSTS hower unable to complete d/t fatigue)  Requires UE support. Static Standing Balance Exercises: Standing at [address] [place] 90sec min assist initially until balance acheived then SBA.       Vitals  Heart Rate: 57  BP: (!) 145/84 (seated)  MAP (Calculated): 104.33         ASSESSMENT/PROGRESS TOWARDS GOALS:   Body Structures, Functions, Activity Limitations Requiring Skilled Therapeutic Intervention: Decreased functional mobility ; Decreased strength;Decreased posture;Decreased balance;Decreased endurance;Decreased safe awareness  Assessment: Pt requires increased time to complete mobility and fatigues quicjly. Requires cues to improve safety with transfers. Pt reports dizziness with positional changes.     Goals:  Long Term Goals  Long term goal 1: Bed mobility with indep  Long term goal 2: Functional transfers with indep  Long term goal 3: Amb 50ft with LRAD and indep  Long term goal 4: 2 steps with handrail with supervision to enter/exit home  Long term goal 5: 5xSTS <18 seconds to demonstrate decreased fall risk  Patient Goals   Patient goals : does not state, family wishes pt to regain her indep    PLAN OF CARE/Safety:   Plan Comment: Cont per POC    Therapy Time:   Individual   Time In 0900   Time Out 1000   Minutes 60     Minutes:  Transfer/Bed mobility training:15  Gait training:10  Neuro re education:5  Therapeutic ex:30      Carlos Field PTA, 07/25/22 at 11:25 AM

## 2022-07-25 NOTE — PROGRESS NOTES
Subjective: The patient complains of  moderate to severe acute    Right sided abdominal pain partially relieved by rest, PT, OT, drain and exacerbated by recent illness and exertion. I am concerned about patients medical complexities and current active problems and barriers to progress including - recently presented with an abdominal mass felt to be either cancer or an abscess. Imaging was thinking it was more like an abscess. She was admitted to [facility] placed on antibiotics and a drain was placed under interventional radiology's guidance. I discussed current functional, rehabilitation, medical status with other rehabilitation providers including nursing and case management. According to recent nursing note, \"  VSS. Denies pain. LBM 7/24. Given immodium for diarrhea which has been effective. Stool specimen sent for C-diff and was resulted as negative. Accordian drain in place to RUQ- bloody drainage present in bag. Dressing to area c/d/I. Continues IV zosyn with no current stop date. K+ 3.2- replaced by IV supplement. Was on IV lopressor for HR management, that was D/c'd and coreg was added on  per cardiology. No distress noted. Call light within reach and bed alarm activated. There is approximately 75cc of bloody drainage in drainage bag. This amount has not changed since observing drain with another RN night prior. Patient incontinent of bowel this AM. Soft but formed. Zinc paste applied to buttocks d/t excoriated inner buttocks secondary to incontinence. Electronically signed by Kiara Curran RN on 7/25/2022 at 6:33 AM     K+ 3.8 today. \".    ROS x10: The patient also complains of severely impaired mobility and activities of daily living. Otherwise no new problems with vision, hearing, nose, mouth, throat, dermal, cardiovascular, GI, , pulmonary, musculoskeletal, psychiatric or neurological. See Rehab H&P on Rehab chart dated .        Vital signs:  /83   Pulse 63 Temp 97 °F (36.1 °C)   Resp 20   SpO2 94%   I/O:   PO/Intake:  fair PO intake, no problems observed or reported. Bowel/Bladder:  continent,  immodium for diarrhea and urinary urgency. General:  Patient is well developed, adequately nourished, non-obese and     well kempt. HEENT:    PERRLA, hearing intact to loud voice, external inspection of ear     and nose benign. Inspection of lips, tongue and gums    Musculoskeletal: No significant change in strength or tone. All joints stable. Inspection and palpation of digits and nails show no clubbing,       cyanosis or inflammatory conditions. Neuro/Psychiatric: Affect: flat. Alert and oriented to person, place and     situation. No significant change in deep tendon reflexes or     Sensation    Lungs:  Diminished, CTA-B. Respiration effort is normal at rest.     Heart:   S1 = S2, RRR. No loud murmurs. Abdomen:  Soft, non-tender, no enlargement of liver or spleen. 75cc of bloody drainage in drainage bag-right abdomen  Extremities:  Trace lower extremity edema,    tenderness. Skin:   Intact to general survey, no visualized or palpated problems  right abd drain.     Rehabilitation:  Physical therapy: FIMS:  Bed Mobility:      Transfers: Sit to Stand: Minimal Assistance  Stand to sit: Minimal Assistance  Bed to Chair: Minimal assistance, WB Status: wbat  Ambulation  Surface: level tile  Device: Standard Walker  Other Apparatus: O2 (3L)  Assistance: Minimal assistance, Contact guard assistance  Quality of Gait: clayton foot drop (CharcoMarieTooth), slow no lob, wc follow for safety as she fatigues easily  Gait Deviations: Slow Lea, Decreased step length, Decreased step height, Decreased head and trunk rotation  Distance: 14 feet x2  Comments: assist with balance reactions and walker management, appears SOB- per pt and family this appears near baseline but pt subjectively reports weakness  More Ambulation?: No,      FIMS:  ,  ,      Occupational therapy: FIMS:   ,  , Assessment: Pt is a 80 y.o. female with above mentioned deficits impairing ability to complete ADL's at reported baseline. Pt may benefit from OT services to address defiicts and maximize safety and function during ADL's. Speech therapy: FIMS:        Lab/X-ray studies reviewed, analyzed and discussed with patient and staff:   Recent Results (from the past 24 hour(s))   Clostridium Difficile Toxin/Antigen    Collection Time: 07/24/22 10:55 AM    Specimen: Stool   Result Value Ref Range    C.diff Toxin/Antigen       Negative for Clostridium difficile antigen and toxin  Normal Range: Negative     Basic Metabolic Panel    Collection Time: 07/25/22  5:33 AM   Result Value Ref Range    Sodium 141 135 - 144 mEq/L    Potassium 3.8 3.4 - 4.9 mEq/L    Chloride 103 95 - 107 mEq/L    CO2 30 20 - 31 mEq/L    Anion Gap 8 (L) 9 - 15 mEq/L    Glucose 114 (H) 70 - 99 mg/dL    BUN 13 8 - 23 mg/dL    Creatinine 0.52 0.50 - 0.90 mg/dL    GFR Non-African American >60.0 >60    GFR  >60.0 >60    Calcium 8.7 8.5 - 9.9 mg/dL       Previous extensive, complex labs, notes and diagnostics reviewed and analyzed. ALLERGIES:    Allergies as of 07/22/2022 - Fully Reviewed 07/22/2022   Allergen Reaction Noted    Latex  07/19/2017    Tape [adhesive tape]  06/28/2016      (please also verify by checking MAR)     Today I evaluated this patient for periodic reassessment of medical and functional status. The patient was discussed in detail at the treatment team meeting focusing on current medical issues, progress in therapies, social issues, psychological issues, barriers to progress and strategies to address these barriers, and discharge planning. See the addendum to rehab progress note-as a second progress note in the chart.   The patient continues to be high risk for future disability and their medical and rehabilitation prognosis continue to be good and therefore, we will continue the patient's rehabilitation course as planned. The patient's tentative discharge date was set. Patient and family education was discussed. The patient was made aware of the team discussion regarding their progress. Complex Physical Medicine & Rehab Issues Assess & Plan:   Severe abnormality of gait and mobility and impaired self-care and ADL's secondary to progressive weakness dt   CMT neuropathy . Functional and medical status reassessed regarding patients ability to participate in therapies and patient found to be able to participate in acute intensive comprehensive inpatient rehabilitation program including PT/OT to improve balance, ambulation, ADLs, and to improve the P/AROM. Therapeutic modifications regarding activities in therapies, place, amount of time per day and intensity of therapy made daily. In bed therapies or bedside therapies prn. Bowel progressive constipation with occasional diarrhea on antibiotics, and Bladder dysfunction monitoring neurogenic bladder:  frequent toileting, ambulate to bathroom with assistance, check post void residuals. Check for C.difficile x1 if >2 loose stools in 24 hours, continue bowel & bladder program.  Monitor bowel and bladder function. Lactinex 2 PO every AC. MOM prn, Brown Bomb prn, Glycerin suppository prn, enema prn. Severe abdominal  pain as well as generalized OA pain,   Lumbar stenosis with neurogenic claudication: reassess pain every shift and prior to and after each therapy session, give prn Tylenol and  consider Percocets, modalities prn in therapy, Lidoderm, K-pad prn. Skin healing and breakdown risk:  continue pressure relief program.  Daily skin exams and reports from nursing. Severe fatigue due to nutritional and hydration deficiency: Add vitamin B12 vitamin D and CoQ10 continue to monitor I&Os, calorie counts prn, dietary consult prn. Add healthy HS snack.   Acute episodic insomnia with situational adjustment disorder:  consider prn low dose Ambien, monitor for day time sedation. Add HS \"Tuck In\"  Falls risk elevated:  patient to use call light to get nursing assistance to get up, bed and chair alarm. Elevated DVT risk: progressive activities in PT, continue prophylaxis PORTILLO hose, elevation and Xarelto. Complex discharge planning:  Weekly team meeting  every Monday to re-assess progress towards goals, discuss and address social, psychological and medical comorbidities and to address difficulties they may be having progressing in therapy. Patient and family education is in progress. The patient is to follow-up with their family physician after discharge. Complex Active General Medical Issues that complicate care Assess & Plan:     1. 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 Continue blood signs every shift focusing on heart rate, rhythm and blood pressure checks with orthostatic checks-monitoring the effect of exercise, therapy and posture. Consult hospitalist for backup medical and adjust/add medications (Xarelto, Coreg). Monitor heart rate and blood pressure as well as medications effects on vital signs before during and after therapy with especial focus on preventing orthostasis and falls risk. Hyponatremia-recheck BMP avoid excessive water  Anemia-Monitor vital signs monitor for orthostasis and tachycardia, check H&H prn. Vitamin B12 and iron-dose iron with food to prevent GI upset. Monitor for constipation. Monitor stools for blood. Hypothyroid-  Synthroid and titrate dosing.   Follow vital signs, recheck TSH and

## 2022-07-25 NOTE — CARE COORDINATION
Facility/Department: [facility] CASE MANAGEMENT    NAME: Tia Rodriguez      : 1932  MRN: [id number]  R250/R250-01      Social/Functional History  Social/Functional History  Lives With: Alone  Type of Home: House  Home Layout: One level  Home Access: Stairs to enter with rails  Entrance Stairs - Number of Steps: 2  Entrance Stairs - Rails: Both  Bathroom Shower/Tub: Tub/Shower unit  Bathroom Equipment: Grab bars in shower  Home Equipment: Cane (B AFOs)  Has the patient had two or more falls in the past year or any fall with injury in the past year?: No  ADL Assistance: Independent  Homemaking Assistance: Independent  Homemaking Responsibilities: Yes  Meal Prep Responsibility: Primary  Laundry Responsibility: Primary  Cleaning Responsibility: Primary  Shopping Responsibility: No (Son performs)  Ambulation Assistance: Independent (furniture walking and cane prn)  Transfer Assistance: Independent  Active : No  Patient's  Info: family  Additional Comments: Social-functional information collected through chart review and dtr able to confirm  Spoke with patient and explained role in the team. Patient questions answered appropriately. Explained discharge process. Patient stated understanding. Spoke with the patient with her daughter who has come in to stay with her at this time. 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 She has been  for 15 years. She lives alone at home. Her son is local and helps her when needed. Her son and DIL are retired and help as needed with her care. The patient was doing well at home doing cooking and cleaning able to bathe and dress herself prior to admission. The patient is alert and oriented. She is very [place] but refused hearing aids. She has a walker at home in the closet. She uses a cane and furniture walks in the house. She used tripod cane out of house. She wears 02 at alltime and had at home 3 liters NC. She uses [facility] in [employer] and [facility], and has no difficulty affording her medications.   Electronically signed by Keith Chandler RN on 7/25/22 at 12:40 PM EDT

## 2022-07-25 NOTE — PLAN OF CARE
Problem: Discharge Planning  Goal: Discharge to home or other facility with appropriate resources  7/24/2022 2238 by Libertad Collins RN  Outcome: Progressing     Problem: Safety - Adult  Goal: Free from fall injury  7/24/2022 2238 by Libertad Collins RN  Outcome: Progressing     Problem: ABCDS Injury Assessment  Goal: Absence of physical injury  7/24/2022 2238 by Libertad Collins RN  Outcome: Progressing     Problem: Skin/Tissue Integrity  Goal: Absence of new skin breakdown  Description: 1. Monitor for areas of redness and/or skin breakdown  2. Assess vascular access sites hourly  3. Every 4-6 hours minimum:  Change oxygen saturation probe site  4. Every 4-6 hours:  If on nasal continuous positive airway pressure, respiratory therapy assess nares and determine need for appliance change or resting period.   7/24/2022 2238 by Libertad Collins RN  Outcome: Progressing

## 2022-07-25 NOTE — PROGRESS NOTES
[facility] Occupational Therapy      Date: 2022  Patient Name: Ebony Fabry        MRN: [id number]  Account: [de-identified]   : 1932  (80 y.o.)  Room: [place] [address]    Chart reviewed, attempted OT at for get up and go. Patient not seen 2° to:    Pt awakened easily to name. Pt requested to be set up for breakfast bed level. Declined grooming tasks. Pt unhappy with breakfast and refused to eat it. Pt requesting \"hot cereal.\" Dietary on floor and notified of pt request vs meal provided. Will attempt again when able.     Electronically signed by Sangita Trejo OT on 2022 at 12:33 PM

## 2022-07-26 PROCEDURE — 6360000002 HC RX W HCPCS: Performed by: INTERNAL MEDICINE

## 2022-07-26 PROCEDURE — 6370000000 HC RX 637 (ALT 250 FOR IP): Performed by: INTERNAL MEDICINE

## 2022-07-26 PROCEDURE — 99232 SBSQ HOSP IP/OBS MODERATE 35: CPT | Performed by: PHYSICAL MEDICINE & REHABILITATION

## 2022-07-26 PROCEDURE — 2580000003 HC RX 258: Performed by: INTERNAL MEDICINE

## 2022-07-26 PROCEDURE — 99232 SBSQ HOSP IP/OBS MODERATE 35: CPT | Performed by: INTERNAL MEDICINE

## 2022-07-26 PROCEDURE — 97535 SELF CARE MNGMENT TRAINING: CPT

## 2022-07-26 PROCEDURE — 6370000000 HC RX 637 (ALT 250 FOR IP): Performed by: PHYSICAL MEDICINE & REHABILITATION

## 2022-07-26 PROCEDURE — 1180000000 HC REHAB R&B

## 2022-07-26 PROCEDURE — 97110 THERAPEUTIC EXERCISES: CPT

## 2022-07-26 PROCEDURE — 2700000000 HC OXYGEN THERAPY PER DAY

## 2022-07-26 PROCEDURE — 6370000000 HC RX 637 (ALT 250 FOR IP): Performed by: NURSE PRACTITIONER

## 2022-07-26 PROCEDURE — 97116 GAIT TRAINING THERAPY: CPT

## 2022-07-26 PROCEDURE — 97530 THERAPEUTIC ACTIVITIES: CPT

## 2022-07-26 PROCEDURE — 99223 1ST HOSP IP/OBS HIGH 75: CPT | Performed by: INTERNAL MEDICINE

## 2022-07-26 RX ADMIN — LACTOBACILLUS TAB 2 TABLET: TAB at 09:34

## 2022-07-26 RX ADMIN — LACTOBACILLUS TAB 2 TABLET: TAB at 13:34

## 2022-07-26 RX ADMIN — PANTOPRAZOLE SODIUM 40 MG: 40 TABLET, DELAYED RELEASE ORAL at 06:41

## 2022-07-26 RX ADMIN — MICONAZOLE NITRATE: 2 POWDER TOPICAL at 09:36

## 2022-07-26 RX ADMIN — PIPERACILLIN AND TAZOBACTAM 3375 MG: 3; .375 INJECTION, POWDER, LYOPHILIZED, FOR SOLUTION INTRAVENOUS at 13:32

## 2022-07-26 RX ADMIN — RIVAROXABAN 15 MG: 15 TABLET, FILM COATED ORAL at 17:53

## 2022-07-26 RX ADMIN — PIPERACILLIN AND TAZOBACTAM 3375 MG: 3; .375 INJECTION, POWDER, LYOPHILIZED, FOR SOLUTION INTRAVENOUS at 21:29

## 2022-07-26 RX ADMIN — SODIUM CHLORIDE, PRESERVATIVE FREE 10 ML: 5 INJECTION INTRAVENOUS at 04:47

## 2022-07-26 RX ADMIN — ONDANSETRON 4 MG: 4 TABLET, ORALLY DISINTEGRATING ORAL at 10:11

## 2022-07-26 RX ADMIN — Medication 10 ML: at 09:36

## 2022-07-26 RX ADMIN — CARVEDILOL 6.25 MG: 6.25 TABLET, FILM COATED ORAL at 09:33

## 2022-07-26 RX ADMIN — CARVEDILOL 6.25 MG: 6.25 TABLET, FILM COATED ORAL at 17:53

## 2022-07-26 RX ADMIN — CITALOPRAM HYDROBROMIDE 20 MG: 10 TABLET ORAL at 09:34

## 2022-07-26 RX ADMIN — LEVOTHYROXINE SODIUM 88 MCG: 88 TABLET ORAL at 09:34

## 2022-07-26 RX ADMIN — PIPERACILLIN AND TAZOBACTAM 3375 MG: 3; .375 INJECTION, POWDER, LYOPHILIZED, FOR SOLUTION INTRAVENOUS at 04:48

## 2022-07-26 RX ADMIN — LACTOBACILLUS TAB 2 TABLET: TAB at 21:31

## 2022-07-26 ASSESSMENT — PAIN SCALES - GENERAL: PAINLEVEL_OUTOF10: 0

## 2022-07-26 ASSESSMENT — ENCOUNTER SYMPTOMS
RESPIRATORY NEGATIVE: 1
GASTROINTESTINAL NEGATIVE: 1
CHEST TIGHTNESS: 0
WHEEZING: 0
COUGH: 0
STRIDOR: 0
NAUSEA: 0
BLOOD IN STOOL: 0
SHORTNESS OF BREATH: 0
EYES NEGATIVE: 1

## 2022-07-26 NOTE — PROGRESS NOTES
Physical Therapy Rehab Treatment Note  [facility]  [facility]       NAME: Carina Simmons  : 1932 (80 y.o.)  MRN: [id number]  CODE STATUS: Full Code    Date of Service: 2022  Chart Reviewed: Yes  Family / Caregiver Present: No    Restrictions:  Restrictions/Precautions: Fall Risk  Position Activity Restriction  Other position/activity restrictions: Abdominal drain       SUBJECTIVE:   Subjective: Pt states she needs to go to the bathroom. Pain  Pain: Pt denies pain. OBJECTIVE:            Transfers  Sit to Stand: Contact guard assistance;Stand by assistance  Stand to sit: Contact guard assistance;Stand by assistance  Bed to Chair: Contact guard assistance  Comment: VCs and demonstration for handplacement to improve safety and technique. Toilet transfer CGA. Assist needed to manage clothing. Ambulation  WB Status: wbat  Ambulation  Surface: carpet  Device: Rolling Walker  Other Apparatus: O2;AFO; Left;Right  Assistance: Contact guard assistance  Quality of Gait: Assist required for management of O2 tubing. Fwd flexed trunk. VCs to amb with upright posture and closer to [address] [place] [address] [place].  Gait Deviations: Slow Lea;Decreased step length  Distance: 60ft  Comments: Fatigues with increased distance. 2nd person to assist with equiptment (IV pole and O2 tank). More Ambulation?: No        PT Exercises  A/AROM Exercises: seated LAQs x10 and march x15 with 2# ankle wts;hip add with ball x20; hip abd YTB x20       ASSESSMENT/PROGRESS TOWARDS GOALS:   Assessment: Pt fatigues with increased distance.     Goals:  Long Term Goals  Long term goal 1: Bed mobility with indep  Long term goal 2: Functional transfers with indep  Long term goal 3: Amb 50ft with LRAD and indep  Long term goal 4: 2 steps with handrail with supervision to enter/exit home  Long term goal 5: 5xSTS <18 seconds to demonstrate decreased fall risk  Patient Goals   Patient goals : does not state, family wishes pt to regain her indep    PLAN OF CARE/Safety:   Plan Comment: Cont per POC    Therapy Time:   Individual   Time In 1500   Time Out 1530   Minutes 30     Minutes:  Transfer/Bed mobility training:10  Gait training:10  Neuro re education:0  Therapeutic ex:10      Carlos Field PTA, 07/26/22 at 3:42 PM

## 2022-07-26 NOTE — PROGRESS NOTES
training, Equipment evaluation, education, & procurement, Self-Care / ADL, Home management training    Continue per OT POC for planned d/c on 8-5-22    Patient goals : \"To be able to stand up and move around on my own. \"  Long Term Goal 1: Pt will increased BUE stength by 1/2 MMT grade. Long Term Goal 2: Pt will be IND with ECWS techniques. Long Term Goal 3: Pt will be Mod IND with LB dressing. Long Term Goal 4: Pt will be Mod IND for functional mobility.         Therapy Time:   Individual Group Co-Treat   Time In 1535       Time Out 1605         Minutes 30                   Therapeutic activities: 30 minutes     Electronically signed by:    PRUDENCE Escobar,   7/26/2022, 4:16 PM

## 2022-07-26 NOTE — PROGRESS NOTES
Patient up to BR with PCA= Small Bm shredded like brown stool- pt pivoted to w/c with minimal assist. Back to bed IVAB started 0450. Call light with in reach bed side table with in reach.  Denies any pain or further needs

## 2022-07-26 NOTE — PROGRESS NOTES
OCCUPATIONAL THERAPY  INPATIENT REHAB TREATMENT NOTE  [facility]      NAME: Mert Coleman  : 1932 (80 y.o.)  MRN: [id number]  CODE STATUS: Full Code  Room: R250/R250-01    Date of Service: 2022    Referring Physician: Dr. 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 My stomach. And I dizzy. And no breath. \"     Pain at start of treatment:  Yes Unable to rate    Pain at end of treatment:   Yes Unable to rate    Pain Location: stomach  Pain Descriptors: like there's something sitting on it  Nursing notified: yes  RN: Patricia Medina  Intervention: RN provided pain medication    COGNITION:  Orientation  Overall Orientation Status: Within Functional Limits  Cognition  Overall Cognitive Status: Exceptions  Cognition Comment: Comp: Supervision, Exp: IND, Social: IND, Prob: Min A, Mem: Supervision    OBJECTIVE:    Grooming/Oral Hygiene  Assistance Level: Set-up  Upper Extremity Bathing  Assistance Level: Set-up  Skilled Clinical Factors: Patient completed sponge bath seated in w/c at bathrooom sink  Lower Extremity Bathing  Assistance Level: Moderate assistance  Skilled Clinical Factors: buttocks; B feet  Upper Extremity Dressing  Assistance Level: Set-up  Lower Extremity Dressing  Assistance Level: Dependent  Putting On/Taking Off Footwear  Assistance Level: Dependent  Skilled Clinical Factors: B socks/shoes/AFO's    ASSESSMENT: Patient pleasant throughout session. Activity Tolerance: Patient tolerated treatment well      PLAN OF CARE: Patient's plan of care was discussed by team OTs and OTAs at Monday POC review meeting.      Functional mobility training, Endurance training, Strengthening, Safety education & training, Patient/Caregiver education & training, Equipment evaluation, education, & procurement, Self-Care / ADL, Home management training    Continue per OT POC for planned d/c on 22    Patient goals : \"To be able to stand up and move around on my own. \"  Long Term Goal 1: Pt will increased BUE stength by 1/2 MMT grade. Long Term Goal 2: Pt will be IND with ECWS techniques. Long Term Goal 3: Pt will be Mod IND with LB dressing. Long Term Goal 4: Pt will be Mod IND for functional mobility.         Therapy Time:   Individual Group Co-Treat   Time In 1000       Time Out 1100         Minutes 60                   ADL/IADL trainin minutes     Electronically signed by:    PRUDENCE Yanes,   2022, 2:25 PM

## 2022-07-26 NOTE — PROGRESS NOTES
Subjective: The patient complains of  moderate to severe acute    Right sided abdominal pain partially relieved by rest, PT, OT, drain and exacerbated by recent illness and exertion. I am concerned about patients medical complexities and current active problems and barriers to progress including - recently presented with an abdominal mass felt to be either cancer or an abscess. Imaging was thinking it was more like an abscess. She was admitted to [facility] placed on antibiotics and a drain was placed under interventional radiology's guidance. I discussed current functional, rehabilitation, medical status with other rehabilitation providers including nursing and case management. According to recent nursing note, \"   Patient up to BR with PCA= Small Bm shredded like brown stool- pt pivoted to w/c with minimal assist. Back to bed IVAB started 0450. Call light with in reach bed side table with in reach. Denies any pain or further needs\". Been concerned about her low potassium level looks to be improving. ROS x10: The patient also complains of severely impaired mobility and activities of daily living. Otherwise no new problems with vision, hearing, nose, mouth, throat, dermal, cardiovascular, GI, , pulmonary, musculoskeletal, psychiatric or neurological. See Rehab H&P on Rehab chart dated . Vital signs:  /77   Pulse 51   Temp 98.7 °F (37.1 °C) (Oral)   Resp 16   SpO2 94%   I/O:   PO/Intake:  fair PO intake, no problems observed or reported. Bowel/Bladder:  continent,  immodium for diarrhea and urinary urgency. General:  Patient is well developed, adequately nourished, non-obese and     well kempt. HEENT:    PERRLA, hearing intact to loud voice, external inspection of ear     and nose benign. Inspection of lips, tongue and gums    Musculoskeletal: No significant change in strength or tone. All joints stable.       Inspection and palpation of digits and nails show no clubbing,       cyanosis or inflammatory conditions. Neuro/Psychiatric: Affect: flat. Alert and oriented to person, place and     situation. No significant change in deep tendon reflexes or     Sensation    Lungs:  Diminished, CTA-B. Respiration effort is normal at rest.     Heart:   S1 = S2, RRR. No loud murmurs. Abdomen:  Soft, non-tender, no enlargement of liver or spleen. 75cc of bloody drainage in drainage bag-right abdomen-  the drain site  Extremities:  Trace lower extremity edema,    tenderness. Skin:   Intact to general survey, no visualized or palpated problems  right abd drain. Rehabilitation:  Physical therapy: FIMS:  Bed Mobility:      Transfers: Sit to Stand: Contact guard assistance  Stand to sit: Contact guard assistance  Bed to Chair: Contact guard assistance, WB Status: wbat  Ambulation  Surface: level tile, carpet  Device: Rolling Walker  Other Apparatus: O2, AFO, Left, Right  Assistance: Contact guard assistance  Quality of Gait: Assist required for management of O2 tubing. Gait Deviations: Slow Lea, Decreased step length  Distance: 30ft  Comments: Fatigues quickly. 2nd person to assist with equiptment (IV pole and O2 tank). More Ambulation?: No,      FIMS:  ,  ,      Occupational therapy: FIMS:   ,  , Assessment: Pt is a 80 y.o. female with above mentioned deficits impairing ability to complete ADL's at reported baseline. Pt may benefit from OT services to address defiicts and maximize safety and function during ADL's. Speech therapy: FIMS:        Lab/X-ray studies reviewed, analyzed and discussed with patient and staff:   No results found for this or any previous visit (from the past 24 hour(s)). Previous extensive, complex labs, notes and diagnostics reviewed and analyzed.      ALLERGIES:    Allergies as of 07/22/2022 - Fully Reviewed 07/22/2022   Allergen Reaction Noted    Latex  07/19/2017    Tape [adhesive tape]  06/28/2016      (please also verify by checking MAR)      Recently, I evaluated this patient for periodic reassessment of medical and functional status. The patient was discussed in detail at the treatment team meeting focusing on current medical issues, progress in therapies, social issues, psychological issues, barriers to progress and strategies to address these barriers, and discharge planning. See the hand written addendum to rehab progress note. The patient continues to be high risk for future disability and their medical and rehabilitation prognosis continue to be good and therefore, we will continue the patient's rehabilitation course as planned. The patient's tentative discharge date was set. Patient and family education was discussed. The patient was made aware of the team discussion regarding their progress. Discharge plans were discussed along with barriers to progress and strategies to address these barriers, patient encouraged to continue to discuss discharge plans with . Complex Physical Medicine & Rehab Issues Assess & Plan:   Severe abnormality of gait and mobility and impaired self-care and ADL's secondary to progressive weakness dt   CMT neuropathy . Functional and medical status reassessed regarding patients ability to participate in therapies and patient found to be able to participate in acute intensive comprehensive inpatient rehabilitation program including PT/OT to improve balance, ambulation, ADLs, and to improve the P/AROM. Therapeutic modifications regarding activities in therapies, place, amount of time per day and intensity of therapy made daily. In bed therapies or bedside therapies prn. Bowel progressive constipation with occasional diarrhea on antibiotics, and Bladder dysfunction monitoring neurogenic bladder:  frequent toileting, ambulate to bathroom with assistance, check post void residuals.   Check for C.difficile x1 if >2 loose stools in 24 hours, continue bowel & bladder program.  Monitor bowel heart failure-Acute rehab to monitor heart rate and rhythm with the option of telemetry and the effects of chronotropic medication with respect to increasing physical activity and exercise in PT, OT, ADLs with medication titration to lowest effective dosing. Continue blood signs every shift focusing on heart rate, rhythm and blood pressure checks with orthostatic checks-monitoring the effect of exercise, therapy and posture. Consult hospitalist for backup medical and adjust/add medications (Xarelto, Coreg). Monitor heart rate and blood pressure as well as medications effects on vital signs before during and after therapy with especial focus on preventing orthostasis and falls risk. Hyponatremia-recheck BMP avoid excessive water  Anemia-Monitor vital signs monitor for orthostasis and tachycardia, check H&H prn. Vitamin B12 and iron-dose iron with food to prevent GI upset. Monitor for constipation. Monitor stools for blood. Hypothyroid-  Synthroid and titrate dosing. Follow vital signs, recheck TSH and thyroid studies as outpatient. 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 Osteoarthritis of lumbar spine with myelopathy-her extremity strengthening    Depression-emotional support provided daily, vitamin B12, encourage participation in rehabilitation support group and recreational therapy, adjust/add medications (B12, Celexa)  Abdominal mass abscess versus cancer-drain still in place pending cytology-patient is on IV Zosyn pending cultures. Closely monitor drainage from her drain site. GERD-Elevate head of bed after meals, monitor stools for blood, lowest effective dose of PPI, consider Tums. Yeast rash and immunosuppression-Micatin powder and Floranex          Focus on coordinating dc plans with patient family and care givers and order necessary equipment.           Ishan Ferrari Estephanie Mccann D.O., PM&R     Attending    [facility]   Focus on endurance, activity pacing, reassessing rehab goals and discharge planning.

## 2022-07-26 NOTE — PLAN OF CARE
Problem: Discharge Planning  Goal: Discharge to home or other facility with appropriate resources  Outcome: Progressing Towards Goal     Problem: Safety - Adult  Goal: Free from fall injury  Outcome: Progressing Towards Goal  Flowsheets (Taken 7/26/2022 1534)  Free From Fall Injury: Instruct family/caregiver on patient safety     Problem: ABCDS Injury Assessment  Goal: Absence of physical injury  Outcome: Progressing Towards Goal  Flowsheets (Taken 7/26/2022 1534)  Absence of Physical Injury: Implement safety measures based on patient assessment     Problem: Skin/Tissue Integrity  Goal: Absence of new skin breakdown  Description: 1. Monitor for areas of redness and/or skin breakdown  2. Assess vascular access sites hourly  3. Every 4-6 hours minimum:  Change oxygen saturation probe site  4. Every 4-6 hours:  If on nasal continuous positive airway pressure, respiratory therapy assess nares and determine need for appliance change or resting period.   Outcome: Progressing Towards Goal     Problem: Pain  Goal: Verbalizes/displays adequate comfort level or baseline comfort level  Outcome: Progressing Towards Goal

## 2022-07-26 NOTE — PROGRESS NOTES
Progress Note  Patient: Mandy Correia  Unit/Bed: R250/R250-01  YOB: 1932  MRN: [id number]  Acct: [de-identified]   Admitting Diagnosis: IMPAIRED MOBILITY AND ADLs DUE TO CHARCOT ARLEEN TOOTH NEUROPATHY  Admit Date:  7/22/2022  Hospital Day: 4    Chief Complaint: PAF    Histories:  Past Medical History:   Diagnosis Date    Ascending aortic aneurysm ([facility] [address] 6/23/2017    Charcot Arleen Tooth muscular atrophy     CHF (congestive heart failure) (HCC)     Chronic diastolic CHF (congestive heart failure) ([facility] [address] 4/1/2022    Depression     Descending aortic aneurysm ([facility] [address] 6/23/2017    Essential hypertension 2/16/2018    Headache     HTN (hypertension)     Impaired mobility and activities of daily living     Lumbar stenosis with neurogenic claudication     Myelopathy ([facility] [address]     Osteoarthritis      Past Surgical History:   Procedure Laterality Date    JOINT REPLACEMENT Bilateral     knees    OTHER SURGICAL HISTORY Right 07/21/2022    cat scan guided abdominal mass aspiration with drain placement by Dr. Ra Burciaga Left 02/25/2021    LEFT PLEURAL CATHETER INSERTION performed by Ilya Vaughn MD at [place] [id number] Left 01/29/2021    total of 755 cc removed per Dr Sai Blank specimen sent to lab     Family History   Problem Relation Age of Onset    Arthritis Mother     Arthritis Father     High Blood Pressure Father      Social History     Socioeconomic History    Marital status:       Spouse name: None    Number of children: 2    Years of education: None    Highest education level: None   Tobacco Use    Smoking status: Never    Smokeless tobacco: Never   Substance and Sexual Activity    Alcohol use: No     Alcohol/week: 0.0 standard drinks    Drug use: No   Social History Narrative    , Lives With: Alone, son lives down the street, dtr is in the area    Type of Home: [address]: One level    Home Access: Stairs to enter with rails- Number of Steps: 2- Rails: Both    Bathroom Shower/Tub: Tub/Shower unit, Bathroom Equipment: Grab bars in shower, Shower chair    Home Equipment: Rolling walker, Cane(Pt infrequemtly uses DME for ambulation and prefers to furniture walk in home)    ADL Assistance: Independent, [address]: Independent    Homemaking Responsibilities: Yes    Ambulation Assistance: Independent, Transfer Assistance: Independent    Additional Comments: Son stops over 2 times daily           Subjective/HPI  sitting in chair. Feels dizzy no cp no sob. No abd discomfort    EKG: SR 60        Review of Systems:   Review of Systems   Constitutional: Negative. Negative for diaphoresis and fatigue. HENT: Negative. Eyes: Negative. Respiratory: Negative. Negative for cough, chest tightness, shortness of breath, wheezing and stridor. Cardiovascular: Negative. Negative for chest pain, palpitations and leg swelling. Gastrointestinal: Negative. Negative for blood in stool and nausea. Genitourinary: Negative. Musculoskeletal: Negative. Skin: Negative. Neurological:  Positive for dizziness. Negative for syncope, weakness and light-headedness. Hematological: Negative. Psychiatric/Behavioral: Negative. Physical Examination:    /77   Pulse 60   Temp 98.7 °F (37.1 °C) (Oral)   Resp 16   SpO2 94%    Physical Exam   Constitutional: She appears healthy. No distress. HENT:   Normal cephalic and Atraumatic   Eyes: Pupils are equal, round, and reactive to light. Neck: Thyroid normal. No JVD present. No neck adenopathy. No thyromegaly present. Cardiovascular: Normal rate, normal heart sounds, intact distal pulses and normal pulses. An irregularly irregular rhythm present. Pulmonary/Chest: Effort normal and breath sounds normal. She has no wheezes. She has no rales. She exhibits no tenderness. Abdominal: Soft. Bowel sounds are normal. There is no abdominal tenderness.    Musculoskeletal:         General: K 3.8 07/25/2022 05:33 AM    K 3.2 07/24/2022 05:00 AM     07/25/2022 05:33 AM    CO2 30 07/25/2022 05:33 AM    BUN 13 07/25/2022 05:33 AM    LABALBU 2.7 07/24/2022 05:00 AM    CREATININE 0.52 07/25/2022 05:33 AM    CALCIUM 8.7 07/25/2022 05:33 AM    GFRAA >60.0 07/25/2022 05:33 AM    LABGLOM >60.0 07/25/2022 05:33 AM    GLUCOSE 114 07/25/2022 05:33 AM     Magnesium:    Lab Results   Component Value Date/Time    MG 1.7 07/24/2022 05:00 AM     Troponin:    Lab Results   Component Value Date/Time    TROPONINI <0.010 07/18/2022 12:30 PM        Active Hospital Problems    Diagnosis Date Noted    A-fib [facility]) [I48.91]      Priority: High    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 [Z74.09, Z78.9]      Priority: High    Hyponatremia [E87.1] 07/18/2022     Priority: Medium    Hypothyroid [E03.9] 07/18/2022     Priority: Medium    CHF (congestive heart failure) ([facility] [address] [I50.9] 07/18/2022     Priority: Medium    Lumbar stenosis with neurogenic claudication [M48.062] 06/02/2016     Priority: Medium    Chronic diastolic CHF (congestive heart failure) ([facility] [address] [I50.32] 04/01/2022     Priority: Low    Acute on chronic combined systolic (congestive) and diastolic (congestive) heart failure ([facility] [address] [I50.43] 02/09/2021     Priority: Low    Essential hypertension [I10] 02/16/2018     Priority: Low    Depression [F32. A]      Priority: Low    Osteoarthritis of lumbar spine with myelopathy [M47.16] 06/02/2016     Priority: Low    Charcot Arleen Tooth muscular atrophy [G60.0] 06/02/2016     Priority: Low        Assessment/Plan:  Abd Abscess - s/p Drainage Drain tube in place. LVEF 60  Frequent PAF - In SR today. On low dose Xarelto   CAD- moderate - no angina. Continue BB  HTN Stable  HPL - statin on hold  Ascending AO aneurysm- routine surveillance. Desscending Ao aneurysm - 4.8- routine surveillance.           Electronically signed by Gisele Villa MD on 7/26/2022 at 10:11 AM

## 2022-07-26 NOTE — PROGRESS NOTES
Ambulated increased distance with decreased assist and initiated stair training. Pt was able to complete 5xSTS requiring increased time. Cont to require cues for safety and technqiue with transfers and gait.     Goals:  Long Term Goals  Long term goal 1: Bed mobility with indep  Long term goal 2: Functional transfers with indep  Long term goal 3: Amb 50ft with LRAD and indep  Long term goal 4: 2 steps with handrail with supervision to enter/exit home  Long term goal 5: 5xSTS <18 seconds to demonstrate decreased fall risk  Patient Goals   Patient goals : does not state, family wishes pt to regain her indep    PLAN OF CARE/Safety:   Plan Comment: Cont per POC    Therapy Time:   Individual   Time In 1100   Time Out 1200   Minutes 60     Minutes:  Transfer/Bed mobility training:10  Gait trainin  Neuro re education:5  Therapeutic ex:15    Mia Alvarez PTA, 22 at 11:58 AM

## 2022-07-26 NOTE — FLOWSHEET NOTE
Pateint assessment is complete. No complaints of pain, zofran was given for upset stomach this morning. RN was told that it helped. Consult for Infectious Disease was called again.

## 2022-07-26 NOTE — PROGRESS NOTES
Pt assessment completed at 2050. Pt mostly Gabonese speaking- ? Able to understand. Seems to somewhat. Denies any pain. HL L AC intact easy flush. IvAB infusing (zosyn). Beeps freq secondary to placement. Accordian drain intact R uQ aBD - red fluid noted at 125ml. Pt up to BR at this time pivot to W/C schuffle steps and slow with cueing. Pt seems somewhat concerned with drain and iv infusing when moving. Sm BM noted Shredded like brown via rectum no discomfort per pt - back to bed call light within reach bedside table within reach.

## 2022-07-26 NOTE — PLAN OF CARE
Problem: Discharge Planning  Goal: Discharge to home or other facility with appropriate resources  Outcome: Progressing Towards Goal  Flowsheets  Taken 7/25/2022 1734 by Berna Bentley RN  Discharge to home or other facility with appropriate resources: Arrange for needed discharge resources and transportation as appropriate  Taken 7/25/2022 1328 by Berna Bentley RN  Discharge to home or other facility with appropriate resources: Identify discharge learning needs (meds, wound care, etc)     Problem: Safety - Adult  Goal: Free from fall injury  Outcome: Progressing Towards Goal     Problem: ABCDS Injury Assessment  Goal: Absence of physical injury  Outcome: Progressing Towards Goal     Problem: Skin/Tissue Integrity  Goal: Absence of new skin breakdown  Description: 1. Monitor for areas of redness and/or skin breakdown  2. Assess vascular access sites hourly  3. Every 4-6 hours minimum:  Change oxygen saturation probe site  4. Every 4-6 hours:  If on nasal continuous positive airway pressure, respiratory therapy assess nares and determine need for appliance change or resting period.   Outcome: Progressing Towards Goal     Problem: Chronic Conditions and Co-morbidities  Goal: Patient's chronic conditions and co-morbidity symptoms are monitored and maintained or improved  Outcome: Progressing Towards Goal  Flowsheets  Taken 7/25/2022 1734 by Berna Bentley RN  Care Plan - Patient's Chronic Conditions and Co-Morbidity Symptoms are Monitored and Maintained or Improved: Collaborate with multidisciplinary team to address chronic and comorbid conditions and prevent exacerbation or deterioration  Taken 7/25/2022 1328 by Berna Bentley RN  Care Plan - Patient's Chronic Conditions and Co-Morbidity Symptoms are Monitored and Maintained or Improved:   Monitor and assess patient's chronic conditions and comorbid symptoms for stability, deterioration, or improvement   Collaborate with multidisciplinary team to address chronic and comorbid conditions and prevent exacerbation or deterioration

## 2022-07-27 PROBLEM — A49.1 STREPTOCOCCUS VIRIDANS INFECTION: Status: ACTIVE | Noted: 2022-07-27

## 2022-07-27 PROBLEM — Z98.890 HX OF DRAINAGE OF ABSCESS: Status: ACTIVE | Noted: 2022-07-27

## 2022-07-27 PROBLEM — R10.84 GENERALIZED ABDOMINAL PAIN: Status: ACTIVE | Noted: 2022-07-27

## 2022-07-27 LAB
CULTURE SURGICAL: ABNORMAL
CULTURE SURGICAL: ABNORMAL
ORGANISM: ABNORMAL

## 2022-07-27 PROCEDURE — 6370000000 HC RX 637 (ALT 250 FOR IP): Performed by: INTERNAL MEDICINE

## 2022-07-27 PROCEDURE — 97112 NEUROMUSCULAR REEDUCATION: CPT

## 2022-07-27 PROCEDURE — 97530 THERAPEUTIC ACTIVITIES: CPT

## 2022-07-27 PROCEDURE — 97535 SELF CARE MNGMENT TRAINING: CPT

## 2022-07-27 PROCEDURE — 6360000002 HC RX W HCPCS: Performed by: INTERNAL MEDICINE

## 2022-07-27 PROCEDURE — 6370000000 HC RX 637 (ALT 250 FOR IP): Performed by: NURSE PRACTITIONER

## 2022-07-27 PROCEDURE — 97116 GAIT TRAINING THERAPY: CPT

## 2022-07-27 PROCEDURE — 2580000003 HC RX 258: Performed by: INTERNAL MEDICINE

## 2022-07-27 PROCEDURE — 1180000000 HC REHAB R&B

## 2022-07-27 PROCEDURE — 99232 SBSQ HOSP IP/OBS MODERATE 35: CPT | Performed by: PHYSICAL MEDICINE & REHABILITATION

## 2022-07-27 PROCEDURE — 6370000000 HC RX 637 (ALT 250 FOR IP): Performed by: PHYSICAL MEDICINE & REHABILITATION

## 2022-07-27 PROCEDURE — 97110 THERAPEUTIC EXERCISES: CPT

## 2022-07-27 RX ADMIN — CARVEDILOL 6.25 MG: 6.25 TABLET, FILM COATED ORAL at 17:48

## 2022-07-27 RX ADMIN — LACTOBACILLUS TAB 2 TABLET: TAB at 15:48

## 2022-07-27 RX ADMIN — ACETAMINOPHEN 650 MG: 325 TABLET ORAL at 08:24

## 2022-07-27 RX ADMIN — LACTOBACILLUS TAB 2 TABLET: TAB at 09:36

## 2022-07-27 RX ADMIN — Medication 10 ML: at 09:35

## 2022-07-27 RX ADMIN — LEVOTHYROXINE SODIUM 88 MCG: 88 TABLET ORAL at 09:36

## 2022-07-27 RX ADMIN — CARVEDILOL 6.25 MG: 6.25 TABLET, FILM COATED ORAL at 09:36

## 2022-07-27 RX ADMIN — CITALOPRAM HYDROBROMIDE 20 MG: 10 TABLET ORAL at 09:36

## 2022-07-27 RX ADMIN — PANTOPRAZOLE SODIUM 40 MG: 40 TABLET, DELAYED RELEASE ORAL at 05:20

## 2022-07-27 RX ADMIN — Medication 10 ML: at 21:17

## 2022-07-27 RX ADMIN — MICONAZOLE NITRATE: 2 POWDER TOPICAL at 09:39

## 2022-07-27 RX ADMIN — RIVAROXABAN 15 MG: 15 TABLET, FILM COATED ORAL at 17:48

## 2022-07-27 RX ADMIN — ONDANSETRON 4 MG: 4 TABLET, ORALLY DISINTEGRATING ORAL at 08:24

## 2022-07-27 RX ADMIN — LACTOBACILLUS TAB 2 TABLET: TAB at 21:15

## 2022-07-27 RX ADMIN — FERROUS SULFATE TAB 325 MG (65 MG ELEMENTAL FE) 325 MG: 325 (65 FE) TAB at 09:36

## 2022-07-27 RX ADMIN — CEFTRIAXONE SODIUM 1000 MG: 1 INJECTION, POWDER, FOR SOLUTION INTRAMUSCULAR; INTRAVENOUS at 15:47

## 2022-07-27 RX ADMIN — MICONAZOLE NITRATE: 2 POWDER TOPICAL at 21:17

## 2022-07-27 RX ADMIN — PIPERACILLIN AND TAZOBACTAM 3375 MG: 3; .375 INJECTION, POWDER, LYOPHILIZED, FOR SOLUTION INTRAVENOUS at 05:15

## 2022-07-27 ASSESSMENT — PAIN SCALES - GENERAL
PAINLEVEL_OUTOF10: 8
PAINLEVEL_OUTOF10: 0

## 2022-07-27 ASSESSMENT — PAIN DESCRIPTION - LOCATION: LOCATION: ABDOMEN

## 2022-07-27 ASSESSMENT — PAIN DESCRIPTION - DESCRIPTORS: DESCRIPTORS: ACHING;PRESSURE

## 2022-07-27 NOTE — PROGRESS NOTES
Patient resting comfortably in bed. Bed alarm is on, call light is within reach. Patient drain output is 125, bright red blood. Patient has tender abdomen, more towards the right lower quadrant. Patient has scant drainage on dressing. IV flushed and patent. Will continue to monitor.

## 2022-07-27 NOTE — CONSULTS
Priyank Rodriguez  7/12/1932  female  Medical Record Number: [id number]    Patient informed that I am an Infectious Disease physician and permission obtained from the patient to speak in front of any visitors prior to any discussion for HIPPA purposes. HPI: Patient with hx of R sided epigastric pain  CT with pericolic abscess s/p drainage with serosanguinous drainage. Persistent RLQ pain. Stool negative for C diff. Having non bloody diarrhea. Seen with nurse at bedside. Only 1-2 stools total today    Of note, she is on Zosyn  ID consulted for pericolic abscess and recommended abx. Descending and ascending aortic aneursym hx as well as CHF, PAF HTN    Review of Systems: All 14 review of systems were discussed with the patient - positive for pain in the RLQ under the dressing and generalized weakness. Past Medical History:   Diagnosis Date    Ascending aortic aneurysm ([facility] [address] 6/23/2017    Charcot Arleen Tooth muscular atrophy     CHF (congestive heart failure) (HCC)     Chronic diastolic CHF (congestive heart failure) ([address] 4/1/2022    Depression     Descending aortic aneurysm ([address] 6/23/2017    Essential hypertension 2/16/2018    Headache     HTN (hypertension)     Impaired mobility and activities of daily living     Lumbar stenosis with neurogenic claudication     Myelopathy ([address]     Osteoarthritis        Past Surgical History:   Procedure Laterality Date    JOINT REPLACEMENT Bilateral     knees    OTHER SURGICAL HISTORY Right 07/21/2022    cat scan guided abdominal mass aspiration with drain placement by Dr. Maria R Bell Left 02/25/2021    LEFT PLEURAL CATHETER INSERTION performed by River Real MD at [place] [id number] Left 01/29/2021    total of 755 cc removed per Dr Varghese Mode specimen sent to lab       Social History     Socioeconomic History    Marital status:       Spouse name: Not on file    Number of children: 2    Years of education: Not on file    Highest education level: Not on file   Occupational History    Not on file   Tobacco Use    Smoking status: Never    Smokeless tobacco: Never   Substance and Sexual Activity    Alcohol use: No     Alcohol/week: 0.0 standard drinks    Drug use: No    Sexual activity: Not on file   Other Topics Concern    Not on file   Social History Narrative    , Lives With: Alone, son lives down the street, dtr is in the area    Type of Home: [address]: One level    Home Access: Stairs to enter with rails- Number of Steps: 2- Rails: Both    Bathroom Shower/Tub: Tub/Shower unit, Bathroom Equipment: Grab bars in shower, Shower chair    Home Equipment: Rolling walker, Cane(Pt infrequemtly uses DME for ambulation and prefers to furniture walk in home)    ADL Assistance: Independent, [address]: [facility] Responsibilities: Yes    Ambulation Assistance: Independent, Transfer Assistance: Independent    Additional Comments: Son stops over 2 times daily         Social Determinants of Health     Financial Resource Strain: Not on file   Food Insecurity: Not on file   Transportation Needs: Not on file   Physical Activity: Not on file   Stress: Not on file   Social Connections: Not on file   Intimate Partner Violence: Not on file   Housing Stability: Not on file       Family History   Problem Relation Age of Onset    Arthritis Mother     Arthritis Father     High Blood Pressure Father        No current facility-administered medications on file prior to encounter.      Current Outpatient Medications on File Prior to Encounter   Medication Sig Dispense Refill    meclizine (ANTIVERT) 12.5 MG tablet Take 12.5 mg by mouth 4 times daily as needed      potassium chloride (KLOR-CON M) 20 MEQ extended release tablet Take 20 mEq by mouth daily (with breakfast)      digoxin (LANOXIN) 125 MCG tablet TAKE 1 TABLET DAILY 90 tablet 3    furosemide (LASIX) 20 MG tablet TAKE 1 TABLET DAILY 90 tablet 3    amLODIPine (NORVASC) 5 MG tablet Take 1 tablet by mouth daily 90 tablet 3    pantoprazole (PROTONIX) 40 MG tablet TAKE 1 TABLET DAILY 90 tablet 3    carvedilol (COREG) 6.25 MG tablet TAKE 1 TABLET TWICE A  tablet 3    XARELTO 15 MG TABS tablet TAKE 1 TABLET DAILY 90 tablet 3    nitroGLYCERIN (NITROSTAT) 0.4 MG SL tablet up to max of 3 total doses. If no relief after 1 dose, call 911. 25 tablet 3    budesonide-formoterol (SYMBICORT) 160-4.5 MCG/ACT AERO Inhale 2 puffs into the lungs 2 times daily 1 Inhaler 3    ferrous sulfate (IRON 325) 325 (65 Fe) MG tablet Take 1 tablet by mouth 2 times daily (with meals) 30 tablet 3    Carboxymethylcellulose Sodium (EYE DROPS OP) Apply 1 drop to eye as needed (Dry eyes) Indications: Systane       Boswellia-Glucosamine-Vit D (OSTEO BI-FLEX ONE PER DAY) TABS Take by mouth daily      Multiple Vitamins-Minerals (CENTRUM SILVER ULTRA WOMENS) TABS Take by mouth daily      vitamin B-12 (CYANOCOBALAMIN) 1000 MCG tablet Take 1,000 mcg by mouth daily      aspirin 81 MG tablet Take 81 mg by mouth daily       citalopram (CELEXA) 20 MG tablet Take 20 mg by mouth daily       SYNTHROID 88 MCG tablet Take 88 mcg by mouth daily          Physical Exam:  Turned on her L side. Drain with serosanguinous, mostly bloody drainage but per nurse, no change in amount. No increase in drainage. General: Patient appears in no acute distress, cooperative  Skin: no new rashes  HEENT: EOMI, MMM, Neck is supple  Heart: S1 S2  Lungs: bilaterally clear  Abdomen: tender around the drain site. There is a drain with bloody drainage as above. Extrem: no asymmetry of the upper or lower extremities  Neuro exam: CN II-XII intact,symmertrical bilaterally, no slurred speech      Labs: I have reviewed all lab results by electronic record, including most recent CBC, metabolic panel, and pertinent abnormalities were addressed from an infectious disease perspective. WBC trends are being monitored.     Lab Results   Component Value Date/Time     07/25/2022 05:33 AM    K 3.8 07/25/2022 05:33 AM    K 3.2 07/24/2022 05:00 AM     07/25/2022 05:33 AM    CO2 30 07/25/2022 05:33 AM    BUN 13 07/25/2022 05:33 AM    CREATININE 0.52 07/25/2022 05:33 AM    GLUCOSE 114 07/25/2022 05:33 AM    CALCIUM 8.7 07/25/2022 05:33 AM      Lab Results   Component Value Date    WBC 11.6 (H) 07/24/2022    HGB 10.7 (L) 07/24/2022    HCT 32.7 (L) 07/24/2022    MCV 83.5 07/24/2022     07/24/2022       Radiology:   I have reviewed imaging results per electronic record and most pertinent abnormalities are being addressed from an infectious disease standpoint. Assessment:  Pericolic abscess s/p drainage  Abdominal pain  Diarrhea ( likely abx induced )   Strep viridans on surgical cx      Plan:    Dc Zosyn in am ( started initially 7/22)  Change to IV ceftriaxone to complete a total 2 week course of abx. Weekly CRP  If DC home planned, can potentially finish course with PO Ceftin. If abd symptoms persist, then may need repeat imaging after course of abx complete. Supportive care  GI panel if diarrhea persists after abx change.    Lactobacillus       Avis Casas D.O.

## 2022-07-27 NOTE — PLAN OF CARE
Problem: Discharge Planning  Goal: Discharge to home or other facility with appropriate resources  7/27/2022 1243 by Anthony Felder RN  Outcome: Progressing Towards Goal  7/27/2022 0144 by Renny Fontanez, RN  Outcome: Progressing Towards Goal     Problem: Safety - Adult  Goal: Free from fall injury  7/27/2022 1243 by Anthony Felder RN  Outcome: Progressing Towards Goal  7/27/2022 0144 by Renny Fontanez, RN  Outcome: Progressing Towards Goal     Problem: ABCDS Injury Assessment  Goal: Absence of physical injury  7/27/2022 1243 by Anthony Felder RN  Outcome: Progressing Towards Goal  7/27/2022 0144 by Rennyfredrick Fontanez, RN  Outcome: Progressing Towards Goal     Problem: Skin/Tissue Integrity  Goal: Absence of new skin breakdown  Description: 1. Monitor for areas of redness and/or skin breakdown  2. Assess vascular access sites hourly  3. Every 4-6 hours minimum:  Change oxygen saturation probe site  4. Every 4-6 hours:  If on nasal continuous positive airway pressure, respiratory therapy assess nares and determine need for appliance change or resting period.   7/27/2022 1243 by Anthony Felder RN  Outcome: Progressing Towards Goal  7/27/2022 0144 by Renny Estee, RN  Outcome: Progressing Towards Goal     Problem: Chronic Conditions and Co-morbidities  Goal: Patient's chronic conditions and co-morbidity symptoms are monitored and maintained or improved  7/27/2022 1243 by Anthony Felder RN  Outcome: Progressing Towards Goal  7/27/2022 0144 by Renny Fontanez, RN  Outcome: Progressing Towards Goal     Problem: Pain  Goal: Verbalizes/displays adequate comfort level or baseline comfort level  7/27/2022 1243 by Anthony Felder RN  Outcome: Progressing Towards Goal  7/27/2022 0144 by Rennyfredrick Fontanez, RN  Outcome: Progressing Towards Goal

## 2022-07-27 NOTE — PLAN OF CARE
Problem: Discharge Planning  Goal: Discharge to home or other facility with appropriate resources  7/27/2022 0144 by Milka Montiel RN  Outcome: Progressing Towards Goal  7/26/2022 1537 by Fransico Quintero RN  Outcome: Progressing Towards Goal     Problem: Safety - Adult  Goal: Free from fall injury  7/27/2022 0144 by Milka Montiel RN  Outcome: Progressing Towards Goal  7/26/2022 1537 by Fransico Quintero RN  Outcome: Progressing Towards Goal  Flowsheets (Taken 7/26/2022 1534)  Free From Fall Injury: Instruct family/caregiver on patient safety     Problem: ABCDS Injury Assessment  Goal: Absence of physical injury  7/27/2022 0144 by Milka Montiel RN  Outcome: Progressing Towards Goal  7/26/2022 1537 by Fransico Quintero RN  Outcome: Progressing Towards Goal  Flowsheets (Taken 7/26/2022 1534)  Absence of Physical Injury: Implement safety measures based on patient assessment     Problem: Skin/Tissue Integrity  Goal: Absence of new skin breakdown  Description: 1. Monitor for areas of redness and/or skin breakdown  2. Assess vascular access sites hourly  3. Every 4-6 hours minimum:  Change oxygen saturation probe site  4. Every 4-6 hours:  If on nasal continuous positive airway pressure, respiratory therapy assess nares and determine need for appliance change or resting period. 7/27/2022 0144 by Milka Montiel RN  Outcome: Progressing Towards Goal  7/26/2022 1537 by Fransico Quintero RN  Outcome: Progressing Towards Goal     Problem: Skin/Tissue Integrity  Goal: Absence of new skin breakdown  Description: 1. Monitor for areas of redness and/or skin breakdown  2. Assess vascular access sites hourly  3. Every 4-6 hours minimum:  Change oxygen saturation probe site  4. Every 4-6 hours:  If on nasal continuous positive airway pressure, respiratory therapy assess nares and determine need for appliance change or resting period.   7/27/2022 0144 by Milka Montiel RN  Outcome: Progressing Towards Goal  7/26/2022 1537 by Jaime Reid RN  Outcome: Progressing Towards Goal     Problem: Chronic Conditions and Co-morbidities  Goal: Patient's chronic conditions and co-morbidity symptoms are monitored and maintained or improved  7/27/2022 0144 by Tameka Iyer RN  Outcome: Progressing Towards Goal  7/26/2022 1537 by Jaime Reid RN  Outcome: Progressing Towards Goal     Problem: Pain  Goal: Verbalizes/displays adequate comfort level or baseline comfort level  7/27/2022 0144 by Tameka Iyer RN  Outcome: Progressing Towards Goal  7/26/2022 1537 by Jaime Reid RN  Outcome: Progressing Towards Goal

## 2022-07-27 NOTE — PROGRESS NOTES
Subjective: The patient complains of  moderate to severe acute    Right sided abdominal pain partially relieved by rest, PT, OT, drain and exacerbated by recent illness and exertion. I am concerned about patients medical complexities and current active problems and barriers to progress including - recently presented with an abdominal mass felt to be either cancer or an abscess. Imaging was thinking it was more like an abscess. She was admitted to [facility] placed on antibiotics and a drain was placed under interventional radiology's guidance. I discussed current functional, rehabilitation, medical status with other rehabilitation providers including nursing and case management. According to recent nursing note, \"Patient resting comfortably in bed. Bed alarm is on, call light is within reach. Patient drain output is 125, bright red blood. Patient has tender abdomen, more towards the right lower quadrant. Patient has scant drainage on dressing. IV flushed and patent. Will continue to monitor\". Been concerned about her low potassium level looks to be improving. I am concerned about her occasional diarrhea and dizziness. I have added lactobacillus and Zofran. ROS x10: The patient also complains of severely impaired mobility and activities of daily living. Otherwise no new problems with vision, hearing, nose, mouth, throat, dermal, cardiovascular, GI, , pulmonary, musculoskeletal, psychiatric or neurological. See Rehab H&P on Rehab chart dated . Vital signs:  /85   Pulse 62   Temp 98.1 °F (36.7 °C) (Oral)   Resp 17   SpO2 96%   I/O:   PO/Intake:  fair PO intake, no problems observed or reported. Bowel/Bladder:  continent,  immodium for diarrhea and urinary urgency. General:  Patient is well developed, adequately nourished, non-obese and     well kempt. HEENT:    PERRLA, hearing intact to loud voice, external inspection of ear     and nose benign.   Inspection of lips, tongue and gums    Musculoskeletal: No significant change in strength or tone. All joints stable. Inspection and palpation of digits and nails show no clubbing,       cyanosis or inflammatory conditions. Neuro/Psychiatric: Affect: flat. Alert and oriented to person, place and     situation. No significant change in deep tendon reflexes or     Sensation    Lungs:  Diminished, CTA-B. Respiration effort is normal at rest.     Heart:   S1 = S2, RRR. No loud murmurs. Abdomen:  Soft, non-tender, no enlargement of liver or spleen. 75cc of bloody drainage in drainage bag-right abdomen-  the drain site  Extremities:  Trace lower extremity edema,    tenderness. Skin:   Intact to general survey, no visualized or palpated problems  right abd drain. Rehabilitation:  Physical therapy: FIMS:  Bed Mobility:      Transfers: Sit to Stand: Contact guard assistance, Stand by assistance  Stand to sit: Contact guard assistance, Stand by assistance  Bed to Chair: Contact guard assistance, WB Status: wbat  Ambulation  Surface: carpet  Device: Rolling Walker  Other Apparatus: O2, AFO, Left, Right  Assistance: Contact guard assistance  Quality of Gait: Assist required for management of O2 tubing. Fwd flexed trunk. VCs to amb with upright posture and closer to [facility].  Gait Deviations: Slow Lea, Decreased step length  Distance: 60ft  Comments: Fatigues with increased distance. 2nd person to assist with equiptment (IV pole and O2 tank). More Ambulation?: No, Stairs  # Steps : 2  Stairs Height: 6\"  Rails: Bilateral  Assistance: Minimal assistance    FIMS:  ,  ,      Occupational therapy: FIMS:   ,  , Assessment: Pt is a 80 y.o. female with above mentioned deficits impairing ability to complete ADL's at reported baseline. Pt may benefit from OT services to address defiicts and maximize safety and function during ADL's.     Speech therapy: FIMS:        Lab/X-ray studies reviewed, analyzed and discussed with patient and prn Tylenol and  consider Percocets, modalities prn in therapy, Lidoderm, K-pad prn. Skin healing and breakdown risk:  continue pressure relief program.  Daily skin exams and reports from nursing. Severe fatigue due to nutritional and hydration deficiency: Add vitamin B12 vitamin D and CoQ10 continue to monitor I&Os, calorie counts prn, dietary consult prn. Add healthy HS snack. Acute episodic insomnia with situational adjustment disorder:  consider prn low dose Ambien, monitor for day time sedation. Add HS \"Tuck In\"  Falls risk elevated:  patient to use call light to get nursing assistance to get up, bed and chair alarm. Elevated DVT risk: progressive activities in PT, continue prophylaxis PORTILLO hose, elevation and Xarelto. Complex discharge planning: Plan for discharge Friday, August 5, 2022 to home alone with support from her son who lives close by and her daughter-in-law. We will also have home health care come out PT OT RN aide and push possibly a . Until then we will continue weekly team meeting  every Monday to re-assess progress towards goals, discuss and address social, psychological and medical comorbidities and to address difficulties they may be having progressing in therapy. Patient and family education is in progress. The patient is to follow-up with their family physician after discharge. Complex Active General Medical Issues that complicate care Assess & Plan:     1. 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   Continue blood signs every shift focusing on heart rate, rhythm and blood pressure checks with orthostatic checks-monitoring the effect of exercise, therapy and posture. Consult hospitalist for backup medical and adjust/add medications (Xarelto, Coreg). Monitor heart rate and blood pressure as well as medications effects on vital signs before during and after therapy with especial focus on preventing orthostasis and falls risk. Hyponatremia-recheck BMP avoid excessive water  Anemia-Monitor vital signs monitor for orthostasis and tachycardia, check H&H prn. Vitamin B12 and iron-dose iron with food to prevent GI upset. Monitor for constipation. Monitor stools for blood. Hypothyroid-  Synthroid and titrate dosing. Follow vital signs, recheck TSH and thyroid studies as outpatient. 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 Osteoarthritis of lumbar spine with myelopathy-her extremity strengthening    Depression-emotional support provided daily, vitamin B12, encourage participation in rehabilitation support group and recreational therapy, adjust/add medications (B12, Celexa)  Abdominal mass abscess versus cancer-drain still in place pending cytology-patient is on IV Zosyn pending cultures. Closely monitor drainage from her drain site. GERD-Elevate head of bed after meals, monitor stools for blood, lowest effective dose of PPI, consider Tums. Yeast rash and immunosuppression-Micatin powder and Floranex        Review and simplify inpatient and outpatient medications and dosing times. Transition to self medication program if able. Kavitha Porras D.O., PM&R     Attending    [address]   Focus on endurance, activity pacing, reassessing rehab goals and discharge planning.

## 2022-07-27 NOTE — PROGRESS NOTES
OCCUPATIONAL THERAPY  INPATIENT REHAB TREATMENT NOTE  [facility]      NAME: Ralph Stubbs  : 1932 (80 y.o.)  MRN: [id number]  CODE STATUS: Full Code  [address]: [address]    Date of Service: 2022    Referring Physician: Dr. Richelle Patton Diagnosis: Impaired mobility and ADL's due to Charcot Jose Bosworth Tooth Neuropathy    Restrictions  Restrictions/Precautions  Restrictions/Precautions: Fall Risk  Required Braces or Orthoses?: No     Patient's date of birth confirmed: Yes    SAFETY:  Safety Devices  Safety Devices in place: Yes  Type of devices: All fall risk precautions in place    SUBJECTIVE:  Subjective: \" Hi girl. \"     Pain at start of treatment:  Yes unable to rate    Pain at end of treatment:   Yes unable to rate    Pain Location: abdomen  Pain Descriptors: tight  Nursing notified: yes  RN: Lorrie Benoit  Intervention: None    COGNITION:  Orientation  Overall Orientation Status: Within Functional Limits  Cognition  Overall Cognitive Status: Exceptions  Cognition Comment: Comp: Supervision, Exp: IND, Social: IND, Prob: Min A, Mem: Supervision    OBJECTIVE:    Patient on 3L NC. Grooming/Oral Hygiene  Assistance Level: Set-up  Upper Extremity Bathing  Assistance Level: Set-up  Lower Extremity Bathing  Assistance Level: Moderate assistance  Skilled Clinical Factors: buttocks; B feet  Upper Extremity Dressing  Assistance Level: Set-up  Lower Extremity Dressing  Assistance Level: Maximum assistance  Skilled Clinical Factors: thread R LE; pull up  Putting On/Taking Off Footwear  Assistance Level: Dependent  Skilled Clinical Factors: B socks/shoes/AFO's    ASSESSMENT: Patient pleasant throughout session.     Activity Tolerance: Patient tolerated treatment well      PLAN OF CARE:  Functional mobility training, Endurance training, Strengthening, Safety education & training, Patient/Caregiver education & training, Equipment evaluation, education, & procurement, Self-Care / ADL, Home management training    Continue per OT POC for planned d/c on 22    Patient goals : \"To be able to stand up and move around on my own. \"  Long Term Goal 1: Pt will increased BUE stength by 1/2 MMT grade. Long Term Goal 2: Pt will be IND with ECWS techniques. Long Term Goal 3: Pt will be Mod IND with LB dressing. Long Term Goal 4: Pt will be Mod IND for functional mobility.         Therapy Time:   Individual Group Co-Treat   Time In 1000       Time Out 1100         Minutes 60                   ADL/IADL trainin minutes     Electronically signed by:    PRUDENCE Rebollar,   2022, 11:48 AM

## 2022-07-27 NOTE — PROGRESS NOTES
Physical Therapy Rehab Treatment Note  Facility/Department: [facility]  [facility]       NAME: Will Dobson  : 1932 (80 y.o.)  MRN: [id number]  CODE STATUS: Full Code    Date of Service: 2022  Chart Reviewed: Yes  Family / Caregiver Present: Yes    Restrictions:  Restrictions/Precautions: Fall Risk  Position Activity Restriction  Other position/activity restrictions: Abdominal drain       SUBJECTIVE:   Subjective: \"Other than the tightness in my stomach no pain. \"    Pain  Pain: Pt denies pain. OBJECTIVE:         Bed mobility  Rolling to Left: Stand by assistance  Rolling to Right: Stand by assistance  Supine to Sit: Stand by assistance  Sit to Supine: Stand by assistance  Bed Mobility Comments: Completed on flat mat without rails. Increased time and effort to complete. Transfers  Sit to Stand: Stand by assistance  Stand to sit: Contact guard assistance;Stand by assistance  Bed to Chair: Stand by assistance    Ambulation  WB Status: wbat  Ambulation  Surface: carpet  Device: Rolling Walker  Other Apparatus: O2;AFO; Left;Right  Assistance: Stand by assistance;Contact guard assistance  Quality of Gait: Assist required for management of O2 tubing. Fwd flexed trunk. VCs to remain close to [facility] with change of direction and approach to chair. Stairs  # Steps : 4  Stairs Height: 6\"  Rails: Bilateral  Assistance: Minimal assistance        PT Exercises  A/AROM Exercises: bridges x10, hooklying march x10, SAQ x10, standing march at [facility] x10  Resistive Exercises: hooklying hip add with ball x10, hooklying hip abd RTB x10, seated LAQ and march with 2# ankle wts x15 ea  Functional Mobility [employer] STS=26sec modified requiring UE support to stand to walker           ASSESSMENT/PROGRESS TOWARDS GOALS:   Assessment: Pt able to complete increased # of steps. Decreased 5xSTS time from 50sec to 26sec.     Goals:  Long Term Goals  Long term goal 1: Bed mobility with indep  Long term goal 2: Functional transfers with indep  Long term goal 3: Amb 50ft with LRAD and indep  Long term goal 4: 2 steps with handrail with supervision to enter/exit home  Long term goal 5: 5xSTS <18 seconds to demonstrate decreased fall risk  Patient Goals   Patient goals : does not state, family wishes pt to regain her indep    PLAN OF CARE/Safety:   Plan Comment: Cont per POC    Therapy Time:   Individual   Time In 1104   Time Out 1204   Minutes 60     Minutes:  Transfer/Bed mobility training:10  Gait trainin  Neuro re education:0  Therapeutic ex:25    Jessie Swann PTA, 22 at 11:55 AM

## 2022-07-27 NOTE — PROGRESS NOTES
Pt in w/c in room, drain to RUQ changed by Special Procedures staff, no drainage noted at this time, accordion compressed. Pt states she recently went to the bathroom, denies any other needs. IV Rocephin infusion begun, pt tolerating well.

## 2022-07-27 NOTE — PROGRESS NOTES
OCCUPATIONAL THERAPY  INPATIENT REHAB TREATMENT NOTE  [facility]      NAME: Yann Blanton  : 1932 (80 y.o.)  MRN: [id number]  CODE STATUS: Full Code  [facility]    Date of Service: 2022    Referring Physician: Dr. 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 \"    Pain at start of treatment: No    Pain at end of treatment: No    Location:   Nursing notified: Not Applicable  RN:   Intervention: None    COGNITION:  Orientation  Overall Orientation Status: Within Functional Limits  Cognition  Overall Cognitive Status: Exceptions    Subjective  Pt has c/o stomach tightness but no pain. Stomach tightness does increase pt fatigue levels with participation in therapy. OBJECTIVE:     Pt completed completed horiz dowel shana tree seated at table top level. Pt placed 1 ring on and took off at a time x 10 rings on top/mid L/ R rods and on bottom rods x 3 rings with SBA. Pt used corresponding arm to side of dowel tree to donning/doffing rings. Pt req several intermittent recovery periods during task d/t increased levels of fatigue. [place] Pt completed task to improve with functional act tolerance and dynamic reaching to improve pt ease and Ind with functional ADL tasks. Education:  Education  Education Given To: Patient  Education Provided Comments: education on letting therapist know when needing to take a break  Education Method: Verbal  Barriers to Learning: Cognition; Other (Comment) (Language barrier at times)  Education Outcome: Verbalized understanding ASSESSMENT:  Assessment: Pt participates well, cooperative  Activity Tolerance: Other (comment) (Limited by c/o stomach tightness/fatigue)      PLAN OF CARE:  Functional mobility training, Endurance training, Strengthening, Safety education & training, Patient/Caregiver education & training, Equipment evaluation, education, & procurement, Self-Care / ADL, Home management training  Continue per OT POC for planned d/c on 8-5-22    Patient goals : \"To be able to stand up and move around on my own. \"  Long Term Goal 1: Pt will increased BUE stength by 1/2 MMT grade. Long Term Goal 2: Pt will be IND with ECWS techniques. Long Term Goal 3: Pt will be Mod IND with LB dressing. Long Term Goal 4: Pt will be Mod IND for functional mobility. Therapy Time:   Individual Group Co-Treat   Time In 1430       Time Out 1500         Minutes 30                   Therapeutic activities: 30 minutes     Electronically signed by:     PRUDENCE Noonan,   7/27/2022, 2:55 PM

## 2022-07-27 NOTE — CARE COORDINATION
JANETW called Mira (son) and scheduled family training for Thursday 7/28 at [facility], he stated that he would bring his sister as well.  Electronically signed by KIN Perez, JACKLYN on 7/27/2022 at 3:36 PM

## 2022-07-27 NOTE — PROGRESS NOTES
Occupational Therapy Get up and Go Note            Date: 2022  Patient Name: Lucie Torres        MRN: [id number]    Account #: [de-identified]  : 1932  (80 y.o.)      Subjective:  Patient states:  Stomach tight  Pain:  Pain at start of treatment: Yes: Pt. unable to rate pain-      Pain at end of treatment: Yes: Pt. unable to rate pain-      Location: stomach  Nursing notified: Yes      Objective:  ADL:  Grooming:  set up for hand hygiene at sink  Toiletin [address] transfers:  CGA/Rivera    Pt t/f'ing to toilet with assist of PCA upon arrival. Pt completed toileting and hand hygiene at the above level. Functional mobility to/from bathroom using wc at dep level. Treatment consisted of:   [x] ADL Training  [] Strengthening   [] Transfer Training    [] DME Education  [] HEP   [] Patient Education  [] Other:    Safety:  Safety Devices  Safety Devices in place:   Type of devices:  All fall risk precautions in place      Therapy Time:   Individual Group Co-Treat   Time In 0755       Time Out 0808         Minutes 13             ADL/IADL trainin minutes         Electronically signed by:    Tosha Jordan OT    2022, 3:09 PM

## 2022-07-27 NOTE — PROGRESS NOTES
Physical Therapy Rehab Treatment Note  Facility/Department[facility]  [facility]       NAME: Talya Fontana  : 1932 (80 y.o.)  MRN: [id number]  CODE STATUS: Full Code    Date of Service: 2022  Chart Reviewed: Yes  Family / Caregiver Present: Yes    Restrictions:  Restrictions/Precautions: Fall Risk  Position Activity Restriction  Other position/activity restrictions: Abdominal drain       SUBJECTIVE:   Subjective: Pt's daughter reports pt states she will use her walker at first when she goes home. Pain  Pain: Pt denies pain. OBJECTIVE:         Transfers  Sit to Stand: Stand by assistance  Stand to sit: Contact guard assistance;Stand by assistance  Bed to Chair: Stand by assistance    Ambulation  WB Status: wbat  Ambulation  Surface: carpet  Device: Rolling Walker  Other Apparatus: O2;AFO; Left;Right  Assistance: Stand by assistance  Quality of Gait: Assist required for management of O2 tubing. Fwd flexed trunk. VCs to remain close to [address] with change of direction and approach to chair. Distance: 50ft  Comments: Gait training at L shayy rail and R st cane SBA in linear direction 30ft. WC brought up after 30 ft.          PT Exercises  A/AROM Exercises: standing march at [address] x10  Dynamic Standing Balance Exercises: gait drills in //bars fwd and lat x2 reps ea with B UE support SBA; Fwd gait drills and turns with R st cane and L bar in //bars CGA               ASSESSMENT/PROGRESS TOWARDS GOALS:   Assessment: Progressed gait training at to st cane requiring L UE on jones rail.   Gait cont to be safest with [address].    Goals:  Long Term Goals  Long term goal 1: Bed mobility with indep  Long term goal 2: Functional transfers with indep  Long term goal 3: Amb 50ft with LRAD and indep  Long term goal 4: 2 steps with handrail with supervision to enter/exit home  Long term goal 5: 5xSTS <18 seconds to demonstrate decreased fall risk  Patient Goals   Patient goals : does not state, family wishes pt to regain her indep    PLAN OF CARE/Safety:   Plan Comment: Family training scheduled for  with pt's son and daughter. Progress gait training to st cane and \"furniture surfing\" as able in protected area to simulate home enviroment.       Therapy Time:   Individual   Time In 1400   Time Out 1430   Minutes 30     Minutes:  Transfer/Bed mobility trainin  Gait training:10  Neuro re education:15  Therapeutic ex:0      Kelby Lemus PTA, 22 at 4:59 PM

## 2022-07-28 PROCEDURE — 97530 THERAPEUTIC ACTIVITIES: CPT

## 2022-07-28 PROCEDURE — 97116 GAIT TRAINING THERAPY: CPT

## 2022-07-28 PROCEDURE — 2580000003 HC RX 258: Performed by: INTERNAL MEDICINE

## 2022-07-28 PROCEDURE — 97112 NEUROMUSCULAR REEDUCATION: CPT

## 2022-07-28 PROCEDURE — 99232 SBSQ HOSP IP/OBS MODERATE 35: CPT | Performed by: PHYSICAL MEDICINE & REHABILITATION

## 2022-07-28 PROCEDURE — 6370000000 HC RX 637 (ALT 250 FOR IP): Performed by: PHYSICAL MEDICINE & REHABILITATION

## 2022-07-28 PROCEDURE — 6360000002 HC RX W HCPCS: Performed by: INTERNAL MEDICINE

## 2022-07-28 PROCEDURE — 6370000000 HC RX 637 (ALT 250 FOR IP): Performed by: INTERNAL MEDICINE

## 2022-07-28 PROCEDURE — 97110 THERAPEUTIC EXERCISES: CPT

## 2022-07-28 PROCEDURE — 97535 SELF CARE MNGMENT TRAINING: CPT

## 2022-07-28 PROCEDURE — 6370000000 HC RX 637 (ALT 250 FOR IP): Performed by: NURSE PRACTITIONER

## 2022-07-28 PROCEDURE — 99232 SBSQ HOSP IP/OBS MODERATE 35: CPT | Performed by: INTERNAL MEDICINE

## 2022-07-28 PROCEDURE — 1180000000 HC REHAB R&B

## 2022-07-28 RX ORDER — ACETAMINOPHEN 500 MG
500 TABLET ORAL 3 TIMES DAILY
Status: DISCONTINUED | OUTPATIENT
Start: 2022-07-28 | End: 2022-07-29 | Stop reason: HOSPADM

## 2022-07-28 RX ORDER — HYDROCODONE BITARTRATE AND ACETAMINOPHEN 5; 325 MG/1; MG/1
1 TABLET ORAL EVERY 4 HOURS PRN
Status: DISCONTINUED | OUTPATIENT
Start: 2022-07-28 | End: 2022-07-29 | Stop reason: HOSPADM

## 2022-07-28 RX ADMIN — CARVEDILOL 6.25 MG: 6.25 TABLET, FILM COATED ORAL at 17:40

## 2022-07-28 RX ADMIN — RIVAROXABAN 15 MG: 15 TABLET, FILM COATED ORAL at 17:40

## 2022-07-28 RX ADMIN — HYDROCODONE BITARTRATE AND ACETAMINOPHEN 1 TABLET: 5; 325 TABLET ORAL at 11:18

## 2022-07-28 RX ADMIN — Medication 10 ML: at 11:20

## 2022-07-28 RX ADMIN — LACTOBACILLUS TAB 2 TABLET: TAB at 10:50

## 2022-07-28 RX ADMIN — CARVEDILOL 6.25 MG: 6.25 TABLET, FILM COATED ORAL at 10:51

## 2022-07-28 RX ADMIN — LACTOBACILLUS TAB 2 TABLET: TAB at 15:06

## 2022-07-28 RX ADMIN — CITALOPRAM HYDROBROMIDE 20 MG: 10 TABLET ORAL at 10:50

## 2022-07-28 RX ADMIN — PANTOPRAZOLE SODIUM 40 MG: 40 TABLET, DELAYED RELEASE ORAL at 06:46

## 2022-07-28 RX ADMIN — ACETAMINOPHEN 500 MG: 500 TABLET ORAL at 21:07

## 2022-07-28 RX ADMIN — LACTOBACILLUS TAB 2 TABLET: TAB at 21:07

## 2022-07-28 RX ADMIN — MICONAZOLE NITRATE: 2 POWDER TOPICAL at 09:30

## 2022-07-28 RX ADMIN — MICONAZOLE NITRATE: 2 POWDER TOPICAL at 21:40

## 2022-07-28 RX ADMIN — ACETAMINOPHEN 500 MG: 500 TABLET ORAL at 15:06

## 2022-07-28 RX ADMIN — CEFTRIAXONE SODIUM 1000 MG: 1 INJECTION, POWDER, FOR SOLUTION INTRAMUSCULAR; INTRAVENOUS at 15:10

## 2022-07-28 RX ADMIN — LEVOTHYROXINE SODIUM 88 MCG: 88 TABLET ORAL at 10:51

## 2022-07-28 ASSESSMENT — ENCOUNTER SYMPTOMS
GASTROINTESTINAL NEGATIVE: 1
SHORTNESS OF BREATH: 0
WHEEZING: 0
NAUSEA: 0
CHEST TIGHTNESS: 0
EYES NEGATIVE: 1
RESPIRATORY NEGATIVE: 1
BLOOD IN STOOL: 0
STRIDOR: 0
COUGH: 0

## 2022-07-28 ASSESSMENT — PAIN SCALES - GENERAL: PAINLEVEL_OUTOF10: 8

## 2022-07-28 ASSESSMENT — PAIN DESCRIPTION - DESCRIPTORS: DESCRIPTORS: HEAVINESS;ACHING

## 2022-07-28 ASSESSMENT — PAIN DESCRIPTION - LOCATION: LOCATION: ABDOMEN

## 2022-07-28 NOTE — PROGRESS NOTES
OCCUPATIONAL THERAPY  INPATIENT REHAB TREATMENT NOTE  [facility]      NAME: Ebony Fabry  : 1932 ([address]  MRN: [id number]  CODE STATUS: Full Code  [facility]    Date of Service: 2022    Referring Physician: Dr. 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 \"    Pain at start of treatment: No    Pain at end of treatment: No        COGNITION:  Orientation  Overall Orientation Status: Within Functional Limits  Cognition  Overall Cognitive Status: Exceptions  Following Commands: Follows one step commands with repetition  Safety Judgement: Decreased awareness of need for assistance;Decreased awareness of need for safety  Initiation: Requires cues for some  Sequencing: Requires cues for some  Cognition Comment: Comp: Supervision, Exp: IND, Social: IND, Prob: Min A, Mem: Supervision          OBJECTIVE:    Pt participated in FM/cog activity manipulating and placing pegs into pegboard in accordance to visual pattern provided to [facility], dexterity, in-hand manipulation skills, visual-motor skills, and problem-solving skills for self-care tasks and ADLs. Pt required Lorri to complete task correctly with 3-4 corrections needed d/t errors.      Pt SBA to participate in dynamic seated functional reach/item retrieval task with use of reacher and dynamic seated balance/coordination activity seated up in w/c requiring pt to weight shift, cross midline, flex trunk, and lean/reach forward out of JAMES in various directions to improve seated balance/coordination, trunk support/strength, trunk control, AE utilization, eye-hand coordination, motor control, and functional reach for ADLs and functional transfers. Verbal and visual cues given for AE instruction/use, initiation, sequencing, and safety. Education:  Education  Education Given To: Patient  Education Provided: Equipment;Cognition  Education Provided Comments: Education provided during cog/FM therapeutic activity. AE training provided on reacher for item retrieval tasks. Education Method: Verbal;Demonstration  Barriers to Learning: Cognition  Education Outcome: Verbalized understanding;Continued education needed    Equipment recommendations:  OT Equipment Recommendations  Other: AE of reacher for item retrieval tasks and to assist with LE dressing. Continue to assess. ASSESSMENT:  Assessment: Pt pleasant and willing to participate in OT session this afternoon. Activity Tolerance: Patient limited by fatigue      PLAN OF CARE:  Functional mobility training, Endurance training, Strengthening, Safety education & training, Patient/Caregiver education & training, Equipment evaluation, education, & procurement, Self-Care / ADL, Home management training  Continue per OT POC for planned d/c on 8-5-22    Patient goals : \"To be able to stand up and move around on my own. \"  Long Term Goal 1: Pt will increased BUE stength by 1/2 MMT grade. Long Term Goal 2: Pt will be IND with ECWS techniques. Long Term Goal 3: Pt will be Mod IND with LB dressing. Long Term Goal 4: Pt will be Mod IND for functional mobility.         Therapy Time:   Individual Group Co-Treat   Time In 1430       Time Out 1500         Minutes 30                   Therapeutic activities: 30 minutes     Electronically signed by:    PRUDENCE Babb,   7/28/2022, 3:51 PM

## 2022-07-28 NOTE — PROGRESS NOTES
Progress Note  Patient: Mandy Correia  Unit/Bed: R250/R250-01  YOB: 1932  MRN: [id number]  Acct: [de-identified]   Admitting Diagnosis: IMPAIRED MOBILITY AND ADLs DUE TO CHARCOT ARLEEN TOOTH NEUROPATHY  Admit Date:  7/22/2022  Hospital Day: 6    Chief Complaint: PAF    Histories:  Past Medical History:   Diagnosis Date    Ascending aortic aneurysm ([facility] [address] 6/23/2017    Charcot Arleen Tooth muscular atrophy     CHF (congestive heart failure) (HCC)     Chronic diastolic CHF (congestive heart failure) ([facility] [address] 4/1/2022    Depression     Descending aortic aneurysm ([facility] [address] 6/23/2017    Essential hypertension 2/16/2018    Headache     HTN (hypertension)     Impaired mobility and activities of daily living     Lumbar stenosis with neurogenic claudication     Myelopathy ([facility] [address]     Osteoarthritis      Past Surgical History:   Procedure Laterality Date    JOINT REPLACEMENT Bilateral     knees    OTHER SURGICAL HISTORY Right 07/21/2022    cat scan guided abdominal mass aspiration with drain placement by Dr. Ra Burciaga Left 02/25/2021    LEFT PLEURAL CATHETER INSERTION performed by Ilya Vaughn MD at [place] [id number] Left 01/29/2021    total of 755 cc removed per Dr Sai Blank specimen sent to lab     Family History   Problem Relation Age of Onset    Arthritis Mother     Arthritis Father     High Blood Pressure Father      Social History     Socioeconomic History    Marital status:       Spouse name: None    Number of children: 2    Years of education: None    Highest education level: None   Tobacco Use    Smoking status: Never    Smokeless tobacco: Never   Substance and Sexual Activity    Alcohol use: No     Alcohol/week: 0.0 standard drinks    Drug use: No   Social History Narrative    , Lives With: Alone, son lives down the street, dtr is in the area    Type of Home: [address]: One level    Home Access: Stairs to enter with rails- Number of Steps: 2- Rails: Both    Bathroom Shower/Tub: Tub/Shower unit, Bathroom Equipment: Grab bars in shower, Shower chair    Home Equipment: Rolling walker, Cane(Pt infrequemtly uses DME for ambulation and prefers to furniture walk in home)    ADL Assistance: Independent, [address]: Independent    Homemaking Responsibilities: Yes    Ambulation Assistance: Independent, Transfer Assistance: Independent    Additional Comments: Son stops over 2 times daily           Subjective/HPI  sitting in chair. Feels dizzy no cp no sob. C/o epigastric discomfort qam. No nausea    EKG: SR 60        Review of Systems:   Review of Systems   Constitutional: Negative. Negative for diaphoresis and fatigue. HENT: Negative. Eyes: Negative. Respiratory: Negative. Negative for cough, chest tightness, shortness of breath, wheezing and stridor. Cardiovascular: Negative. Negative for chest pain, palpitations and leg swelling. Gastrointestinal: Negative. Negative for blood in stool and nausea. Genitourinary: Negative. Musculoskeletal: Negative. Skin: Negative. Neurological:  Positive for dizziness. Negative for syncope, weakness and light-headedness. Hematological: Negative. Psychiatric/Behavioral: Negative. Physical Examination:    /61   Pulse 60   Temp 97.9 °F (36.6 °C) (Oral)   Resp 18   SpO2 95%    Physical Exam   Constitutional: She appears healthy. No distress. HENT:   Normal cephalic and Atraumatic   Eyes: Pupils are equal, round, and reactive to light. Neck: Thyroid normal. No JVD present. No neck adenopathy. No thyromegaly present. Cardiovascular: Normal rate, normal heart sounds, intact distal pulses and normal pulses. An irregularly irregular rhythm present. Pulmonary/Chest: Effort normal and breath sounds normal. She has no wheezes. She has no rales. She exhibits no tenderness. Abdominal: Soft. Bowel sounds are normal. There is no abdominal tenderness. Musculoskeletal:         General: No tenderness or edema. Normal range of motion. Cervical back: Normal range of motion and neck supple. Neurological: She is alert and oriented to person, place, and time. Skin: Skin is warm. No cyanosis. Nails show no clubbing.      LABS:  CBC:   Lab Results   Component Value Date/Time    WBC 11.6 07/24/2022 05:00 AM    RBC 3.92 07/24/2022 05:00 AM    HGB 10.7 07/24/2022 05:00 AM    HCT 32.7 07/24/2022 05:00 AM    MCV 83.5 07/24/2022 05:00 AM    MCH 27.3 07/24/2022 05:00 AM    MCHC 32.7 07/24/2022 05:00 AM    RDW 14.2 07/24/2022 05:00 AM     07/24/2022 05:00 AM     CBC with Differential:    Lab Results   Component Value Date/Time    WBC 11.6 07/24/2022 05:00 AM    RBC 3.92 07/24/2022 05:00 AM    HGB 10.7 07/24/2022 05:00 AM    HCT 32.7 07/24/2022 05:00 AM     07/24/2022 05:00 AM    MCV 83.5 07/24/2022 05:00 AM    MCH 27.3 07/24/2022 05:00 AM    MCHC 32.7 07/24/2022 05:00 AM    RDW 14.2 07/24/2022 05:00 AM    BANDSPCT 2 07/24/2022 05:00 AM    METASPCT 2 07/24/2022 05:00 AM    LYMPHOPCT 7.0 07/24/2022 05:00 AM    MONOPCT 5.8 07/24/2022 05:00 AM    BASOPCT 1.0 07/24/2022 05:00 AM    MONOSABS 0.7 07/24/2022 05:00 AM    LYMPHSABS 0.8 07/24/2022 05:00 AM    EOSABS 0.0 07/24/2022 05:00 AM    BASOSABS 0.1 07/24/2022 05:00 AM     CMP:    Lab Results   Component Value Date/Time     07/25/2022 05:33 AM    K 3.8 07/25/2022 05:33 AM    K 3.2 07/24/2022 05:00 AM     07/25/2022 05:33 AM    CO2 30 07/25/2022 05:33 AM    BUN 13 07/25/2022 05:33 AM    CREATININE 0.52 07/25/2022 05:33 AM    GFRAA >60.0 07/25/2022 05:33 AM    LABGLOM >60.0 07/25/2022 05:33 AM    GLUCOSE 114 07/25/2022 05:33 AM    PROT 6.0 07/24/2022 05:00 AM    LABALBU 2.7 07/24/2022 05:00 AM    CALCIUM 8.7 07/25/2022 05:33 AM    BILITOT 0.3 07/24/2022 05:00 AM    ALKPHOS 51 07/24/2022 05:00 AM    AST 20 07/24/2022 05:00 AM    ALT 19 07/24/2022 05:00 AM     BMP:    Lab Results   Component Value Date/Time     07/25/2022 05:33 AM    K 3.8 07/25/2022 05:33 AM    K 3.2 07/24/2022 05:00 AM     07/25/2022 05:33 AM    CO2 30 07/25/2022 05:33 AM    BUN 13 07/25/2022 05:33 AM    LABALBU 2.7 07/24/2022 05:00 AM    CREATININE 0.52 07/25/2022 05:33 AM    CALCIUM 8.7 07/25/2022 05:33 AM    GFRAA >60.0 07/25/2022 05:33 AM    LABGLOM >60.0 07/25/2022 05:33 AM    GLUCOSE 114 07/25/2022 05:33 AM     Magnesium:    Lab Results   Component Value Date/Time    MG 1.7 07/24/2022 05:00 AM     Troponin:    Lab Results   Component Value Date/Time    TROPONINI <0.010 07/18/2022 12:30 PM        Active Hospital Problems    Diagnosis Date Noted    A-fib [facility]) [I48.91]      Priority: High    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 [Z74.09, Z78.9]      Priority: High    Hx of drainage of abscess [Z98.890] 07/27/2022     Priority: Medium    Generalized abdominal pain [R10.84] 07/27/2022     Priority: Medium    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] 07/27/2022     Priority: Medium    Hyponatremia [E87.1] 07/18/2022     Priority: Medium    Hypothyroid [E03.9] 07/18/2022     Priority: Medium    CHF (congestive heart failure) ([facility] [address] [I50.9] 07/18/2022     Priority: Medium    Lumbar stenosis with neurogenic claudication [M48.062] 06/02/2016     Priority: Medium    Chronic diastolic CHF (congestive heart failure) ([facility] [address] [I50.32] 04/01/2022     Priority: Low    Acute on chronic combined systolic (congestive) and diastolic (congestive) heart failure ([facility] [address] [I50.43] 02/09/2021     Priority: Low    Essential hypertension [I10] 02/16/2018     Priority: Low    Depression [F32. 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 Continued mild AM epigastric discomfort. May need to consider re imaging.    LVEF 60  Frequent PAF - In SR today. On low dose Xarelto   CAD- moderate - no angina. Continue BB  HTN Stable  HPL - statin on hold  Ascending AO aneurysm- routine surveillance. Desscending Ao aneurysm - 4.8- routine surveillance.           Electronically signed by Chance Del Real MD on 7/28/2022 at 9:10 AM

## 2022-07-28 NOTE — PROGRESS NOTES
Subjective: The patient complains of  moderate to severe acute    Right sided abdominal pain partially relieved by rest, PT, OT, drain and exacerbated by recent illness and exertion. I am concerned about patients medical complexities and current active problems and barriers to progress including - recently presented with an abdominal mass felt to be either cancer or an abscess. Imaging was thinking it was more like an abscess. She was admitted to [facility] placed on antibiotics and a drain was placed under interventional radiology's guidance. I discussed current functional, rehabilitation, medical status with other rehabilitation providers including nursing and case management. According to recent nursing note, \" Pt resting quietly, shift assessment complete, LBM 7/28/22 Pt takes pills whole with thin liquids, #20 IV Left AC, Pt continent of bowel and bladder, Pt on 2L 02, call light within reach, bed alarm on\". Been concerned about her low potassium level looks to be improving. I am concerned about her occasional diarrhea and dizziness. I have added lactobacillus and Zofran. Still is having some abdominal pain at the drain site. She is very tough and does not like to take any medications unless is necessary. I had a long discussion with her and her son and we do feel that is necessary to improve her quality of life and ease her suffering. She will take scheduled Tylenol 3 a day and I will add as needed Norco if things get bad. ROS x10: The patient also complains of severely impaired mobility and activities of daily living. Otherwise no new problems with vision, hearing, nose, mouth, throat, dermal, cardiovascular, GI, , pulmonary, musculoskeletal, psychiatric or neurological. See Rehab H&P on Rehab chart dated .        Vital signs:  /61   Pulse 60   Temp 97.9 °F (36.6 °C) (Oral)   Resp 18   SpO2 95%   I/O:   PO/Intake:  fair PO intake, no problems observed or reported. Bowel/Bladder:  continent,  immodium for diarrhea and urinary urgency. General:  Patient is well developed, adequately nourished, non-obese and     well kempt. HEENT:    PERRLA, hearing intact to loud voice, external inspection of ear     and nose benign. Inspection of lips, tongue and gums    Musculoskeletal: No significant change in strength or tone. All joints stable. Inspection and palpation of digits and nails show no clubbing,       cyanosis or inflammatory conditions. Neuro/Psychiatric: Affect: flat. Alert and oriented to person, place and     situation. No significant change in deep tendon reflexes or     Sensation    Lungs:  Diminished, CTA-B. Respiration effort is normal at rest.     Heart:   S1 = S2, RRR. No loud murmurs. Abdomen:  Soft, non-tender, no enlargement of liver or spleen-75cc of bloody drainage in drainage bag-right abdomen- tenderness at the drain site  Extremities:  Trace lower extremity edema,    tenderness. Skin:   Intact to general survey, no visualized or palpated problems  right abd drain. Rehabilitation:  Physical therapy: FIMS:  Bed Mobility:      Transfers: Sit to Stand: Stand by assistance  Stand to sit: Contact guard assistance, Stand by assistance  Bed to Chair: Stand by assistance, WB Status: wbat  Ambulation  Surface: carpet  Device: Rolling Walker  Other Apparatus: O2, AFO, Left, Right  Assistance: Stand by assistance  Quality of Gait: Assist required for management of O2 tubing. Fwd flexed trunk. VCs to remain close to [facility] [place] with change of direction and approach to chair. Gait Deviations: Slow Lea, Decreased step length  Distance: 50ft  Comments: Gait training at L shayy rail and R st cane SBA in linear direction 30ft. WC brought up after 30 ft. More Ambulation?: No, Stairs  # Steps : 4  Stairs Height: 6\"  Rails: Bilateral  Assistance: Minimal assistance    FIMS:  ,  ,      Occupational therapy: FIMS:   ,  , Assessment: Pt is a 80 y.o. female with above mentioned deficits impairing ability to complete ADL's at reported baseline. Pt may benefit from OT services to address defiicts and maximize safety and function during ADL's. Speech therapy: FIMS:        Lab/X-ray studies reviewed, analyzed and discussed with patient and staff:   No results found for this or any previous visit (from the past 24 hour(s)). Previous extensive, complex labs, notes and diagnostics reviewed and analyzed. ALLERGIES:    Allergies as of 07/22/2022 - Fully Reviewed 07/22/2022   Allergen Reaction Noted    Latex  07/19/2017    Tape [adhesive tape]  06/28/2016      (please also verify by checking [facility])       Complex Physical Medicine & Rehab Issues Assess & Plan:   Severe abnormality of gait and mobility and impaired self-care and ADL's secondary to progressive weakness dt   CMT neuropathy . Functional and medical status reassessed regarding patients ability to participate in therapies and patient found to be able to participate in acute intensive comprehensive inpatient rehabilitation program including PT/OT to improve balance, ambulation, ADLs, and to improve the P/AROM. Therapeutic modifications regarding activities in therapies, place, amount of time per day and intensity of therapy made daily. In bed therapies or bedside therapies prn. Bowel progressive constipation with occasional diarrhea on antibiotics, and Bladder dysfunction monitoring neurogenic bladder:  frequent toileting, ambulate to bathroom with assistance, check post void residuals. Check for C.difficile x1 if >2 loose stools in 24 hours, continue bowel & bladder program.  Monitor bowel and bladder function. Lactinex 2 PO every AC. MOM prn, Brown Bomb prn, Glycerin suppository prn, enema prn.   Severe abdominal  pain as well as generalized OA pain,   Lumbar stenosis with neurogenic claudication: reassess pain every shift and prior to and after each therapy session, give Tylenol and prn Tylenol and Norco, modalities prn in therapy, Lidoderm, K-pad prn. Skin healing and breakdown risk:  continue pressure relief program.  Daily skin exams and reports from nursing. Severe fatigue due to nutritional and hydration deficiency: Add vitamin B12 vitamin D and CoQ10 continue to monitor I&Os, calorie counts prn, dietary consult prn. Add healthy HS snack. Acute episodic insomnia with situational adjustment disorder:  consider prn low dose Ambien, monitor for day time sedation. Add HS \"Tuck In\"  Falls risk elevated:  patient to use call light to get nursing assistance to get up, bed and chair alarm. Elevated DVT risk: progressive activities in PT, continue prophylaxis PORTILLO hose, elevation and Xarelto. Complex discharge planning: Plan for discharge Friday, August 5, 2022 to home alone with support from her son who lives close by and her daughter-in-law. We will also have home health care come out PT OT RN aide and push possibly a . Until then we will continue weekly team meeting  every Monday to re-assess progress towards goals, discuss and address social, psychological and medical comorbidities and to address difficulties they may be having progressing in therapy. Patient and family education is in progress. The patient is to follow-up with their family physician after discharge. Complex Active General Medical Issues that complicate care Assess & Plan:     1. 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   Continue blood signs every shift focusing on heart rate, rhythm and blood pressure checks with orthostatic checks-monitoring the effect of exercise, therapy and posture. Consult hospitalist for backup medical and adjust/add medications (Xarelto, Coreg). Monitor heart rate and blood pressure as well as medications effects on vital signs before during and after therapy with especial focus on preventing orthostasis and falls risk. Hyponatremia-recheck BMP avoid excessive water  Anemia-Monitor vital signs monitor for orthostasis and tachycardia, check H&H prn. Vitamin B12 and iron-dose iron with food to prevent GI upset. Monitor for constipation. Monitor stools for blood. Hypothyroid-  Synthroid and titrate dosing. Follow vital signs, recheck TSH and thyroid studies as outpatient. 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 Osteoarthritis of lumbar spine with myelopathy-her extremity strengthening    Depression-emotional support provided daily, vitamin B12, encourage participation in rehabilitation support group and recreational therapy, adjust/add medications (B12, Celexa)  Abdominal mass abscess versus cancer-drain still in place pending cytology-patient is on IV Zosyn pending cultures. Closely monitor drainage from her drain site. GERD-Elevate head of bed after meals, monitor stools for blood, lowest effective dose of PPI, consider Tums. Yeast rash and immunosuppression-Micatin powder and Floranex        Focus on reassessing rehab goals and coordinating therapy and medical self care issues. Alba Noble D.O., PM&R     Attending    [address]   Focus on endurance, activity pacing, reassessing rehab goals and discharge planning.

## 2022-07-28 NOTE — PROGRESS NOTES
OCCUPATIONAL THERAPY  INPATIENT REHAB TREATMENT NOTE  [facility]      NAME: Artis Matos  : 1932 (80 y.o.)  MRN: [id number]  CODE STATUS: Full Code  Room: R2/R250-01    Date of Service: 2022    Referring Physician: Dr. Hardeep Glynn Diagnosis: Impaired mobility and ADL's due to Charcot Moreno Tulio Tooth Neuropathy    Restrictions  Restrictions/Precautions  Restrictions/Precautions: Fall Risk     Patient's date of birth confirmed: Yes    SAFETY:  Safety Devices  Safety Devices in place: Yes  Type of devices: All fall risk precautions in place    SUBJECTIVE:    Pain at start of treatment: Pt denies pain at start of session. Pain at end of treatment: Pt reports feeling like she has a \"rock\" in her stomach. Pt does not numerically rate pain. RN Tameka Figueroa notified that pt requests pain medication. OBJECTIVE: Pt scheduled for full ADL with Occupational Therapy for family training. Pt declined ADL reporting that she had a shower yesterday. Pt's son present in room and reports that his mother showers every other day with encouragement. Pt's son declined family training at this time reporting that he observed his mother in Physical Therapy and is confident that her functional status will improve more before her discharge on 22. Pt's son also reports that he thinks his mother requests more assistance when he or his sister is present for session. Pt's son requests to trial session without him present. Grooming/Oral Hygiene  Assistance Level: Set-up  Skilled Clinical Factors: To complete hand hygiene while sitting at sink  Toileting  Assistance Level: Maximum assistance  Skilled Clinical Factors: Assistance for mgmt of pants past hips. Pt wearing tight fitting pants  Toilet Transfers  Technique: Stand pivot  Equipment: Grab bars;Standard toilet  Additional Factors: Verbal cues  Assistance Level: Stand by assist  Skilled Clinical Factors: WC to and from toilet.  Verbal and up and move around on my own. \"  Long Term Goal 1: Pt will increased BUE stength by 1/2 MMT grade. Long Term Goal 2: Pt will be IND with ECWS techniques. Long Term Goal 3: Pt will be Mod IND with LB dressing. Long Term Goal 4: Pt will be Mod IND for functional mobility.     Therapy Time:   Individual Group Co-Treat   Time In 1005       Time Out 1105         Minutes 60         ADL/IADL training: 15 minutes  Therapeutic activities: 45 minutes     Electronically signed by PRUDENCE Clark on 7/28/2022 at 11:31 AM

## 2022-07-28 NOTE — PROGRESS NOTES
Pt resting quietly, shift assessment complete, LBM 7/28/22 Pt takes pills whole with thin liquids, #20 IV Left AC, Pt continent of bowel and bladder, Pt on 2L 02, call light within reach, bed alarm on. Electronically signed by Gerhardt Plowman, LPN on 9/42/8995 at 3:62 AM

## 2022-07-28 NOTE — PROGRESS NOTES
Physical Therapy Rehab Treatment Note  Facility/Department[facility]  [facility]       NAME: Mert Coleman  : 1932 (80 y.o.)  MRN: [id number]  CODE STATUS: Full Code    Date of Service: 2022       Restrictions:  Restrictions/Precautions: Fall Risk  Position Activity Restriction  Other position/activity restrictions: Abdominal drain       SUBJECTIVE:   Subjective: \" my side hurts a little. \"    Pain  Pain: Pt's pain in side at surgical site not given a number. Pt declined intervention pre and post treatment. OBJECTIVE:               Transfers  Surface: Wheelchair  Additional Factors: Set-up  Device: Walker  Sit to Stand  Assistance Level: Supervision  Skilled Clinical Factors: No cues needed consistently  Stand to Sit  Assistance Level: Supervision  Skilled Clinical Factors: Pt looking back to chair and sitting down with good safety. Ambulation  Surface: Carpet  Device: Rolling walker  Distance: 50' x 1  Activity: Within Unit  Activity Comments: Son reports gait is a \"little slower, but her normal gait\"  Additional Factors:  (3L O2)  Assistance Level: Stand by assist  Gait Deviations: Slow raji;Decreased step length bilateral             Neuromuscular Education  NDT Treatment: Gait  (Managing O2 cord with Max cues needed with pt only recognizing once how to manage cord out of 7 incidents.)    PT Exercises  A/AROM Exercises: Marching, LAQ, Side kicks x 20  Resistive Exercises: MRE'S: ABD/ADD x 15          Activity Tolerance  Activity Tolerance: Patient limited by fatigue          ASSESSMENT/PROGRESS TOWARDS GOALS:   Assessment  Assessment: Pt with poor O2 cord management with poor awareness and poor decision making. Pt  with poor awarenss of problem occurring with O2 management and also needing cues to problem solve. Gait steady. Activity Tolerance: Patient limited by fatigue  Discharge Recommendations: Continue to assess pending progress; Patient would benefit from

## 2022-07-28 NOTE — PROGRESS NOTES
Physical Therapy Rehab Treatment Note  [facility]  [facility]       NAME: Marcella Malik  : 1932 ([address]  y.[address]  MRN: [id number]  CODE STATUS: Full Code    Date of Service: 2022       Restrictions:  Restrictions/Precautions: Fall Risk  Position Activity Restriction  Other position/activity restrictions: Abdominal drain       SUBJECTIVE:   Subjective: Son present and provided  services, as pt not responding well to proper . Pain  Pain: Pre and post: Pt c/o stomach \"feels like a rock. \" but did not give a number. Nursing notified. OBJECTIVE:             Bed Mobility  Additional Factors: Head of bed flat; With handrails;Verbal cues; Increased time to complete  Roll Left  Assistance Level: Stand by assist  Roll Right  Assistance Level: Stand by assist  Sit to Supine  Assistance Level: Minimal assistance  Skilled Clinical Factors: Pt limited by pain and needing assist with one LE onto bed. Supine to Sit  Assistance Level: Stand by assist  Scooting  Assistance Level: Stand by assist    Transfers  Surface: From bed; To bed; To chair with arms;From chair with arms  Additional Factors: Set-up  Device: Walker  Sit to Stand  Assistance Level: Stand by assist;Supervision  Skilled Clinical Factors: No cues needed consistently  Stand to Sit  Assistance Level: Stand by assist;Supervision  Skilled Clinical Factors: Pt looking back to chair and sitting down with good safety. Bed To/From Chair  Technique: Stand pivot  Assistance Level: Stand by assist;Supervision  Stand Pivot  Assistance Level: Stand by [employer]  Assistance Level: Minimal assistance  Skilled Clinical Factors: Pt needing tactile cues to turn hips to sit first vs placing foot inside car. Pt required minimal assist with sit to stand out of car.     Ambulation  Surface: Carpet  Device: Rolling walker  Distance: 50' x 2  Activity: Within Unit  Activity Comments: Son reports gait Devices  Type of Devices:  All cherelle prominences offloaded;Left in chair;Chair alarm in place      Therapy Time:   Individual   Time In 0900   Time Out 1000   Minutes 60     Minutes:60  Transfer/Bed mobility trainin  Gait training:  [facility] HospitalJERRICA, 22 at 12:34 PM

## 2022-07-29 ENCOUNTER — APPOINTMENT (OUTPATIENT)
Dept: CT IMAGING | Age: 87
DRG: 074 | End: 2022-07-29
Attending: PHYSICAL MEDICINE & REHABILITATION
Payer: MEDICARE

## 2022-07-29 ENCOUNTER — HOSPITAL ENCOUNTER (INPATIENT)
Age: 87
LOS: 5 days | Discharge: NURSING, REHAB OR LONG-TERM CARE | DRG: 300 | End: 2022-08-03
Attending: INTERNAL MEDICINE | Admitting: FAMILY MEDICINE
Payer: MEDICARE

## 2022-07-29 VITALS
HEIGHT: 60 IN | OXYGEN SATURATION: 95 % | WEIGHT: 170.6 LBS | BODY MASS INDEX: 33.49 KG/M2 | DIASTOLIC BLOOD PRESSURE: 74 MMHG | TEMPERATURE: 98.1 F | SYSTOLIC BLOOD PRESSURE: 115 MMHG | HEART RATE: 58 BPM | RESPIRATION RATE: 16 BRPM

## 2022-07-29 PROBLEM — I74.10 AORTIC THROMBUS (HCC): Status: ACTIVE | Noted: 2022-01-01

## 2022-07-29 PROBLEM — I71.20 THORACIC AORTIC ANEURYSM WITHOUT RUPTURE: Status: ACTIVE | Noted: 2017-06-23

## 2022-07-29 PROBLEM — L02.91 ABSCESS: Status: ACTIVE | Noted: 2022-07-29

## 2022-07-29 LAB
APTT: 33.7 SEC (ref 24.4–36.8)
APTT: 99.8 SEC (ref 24.4–36.8)
HCT VFR BLD CALC: 31.6 % (ref 37–47)
HEMOGLOBIN: 10.2 G/DL (ref 12–16)
INR BLD: 1.5
MCH RBC QN AUTO: 27.4 PG (ref 27–31.3)
MCHC RBC AUTO-ENTMCNC: 32.2 % (ref 33–37)
MCV RBC AUTO: 85 FL (ref 82–100)
PDW BLD-RTO: 14.4 % (ref 11.5–14.5)
PLATELET # BLD: 282 K/UL (ref 130–400)
PROTHROMBIN TIME: 17.4 SEC (ref 12.3–14.9)
RBC # BLD: 3.72 M/UL (ref 4.2–5.4)
REASON FOR REJECTION: NORMAL
REJECTED TEST: NORMAL
WBC # BLD: 8.8 K/UL (ref 4.8–10.8)

## 2022-07-29 PROCEDURE — 74178 CT ABD&PLV WO CNTR FLWD CNTR: CPT | Performed by: RADIOLOGY

## 2022-07-29 PROCEDURE — 2060000000 HC ICU INTERMEDIATE R&B

## 2022-07-29 PROCEDURE — 85730 THROMBOPLASTIN TIME PARTIAL: CPT

## 2022-07-29 PROCEDURE — 2580000003 HC RX 258: Performed by: INTERNAL MEDICINE

## 2022-07-29 PROCEDURE — 36415 COLL VENOUS BLD VENIPUNCTURE: CPT

## 2022-07-29 PROCEDURE — 6360000002 HC RX W HCPCS: Performed by: NURSE PRACTITIONER

## 2022-07-29 PROCEDURE — 99239 HOSP IP/OBS DSCHRG MGMT >30: CPT | Performed by: PHYSICAL MEDICINE & REHABILITATION

## 2022-07-29 PROCEDURE — 99233 SBSQ HOSP IP/OBS HIGH 50: CPT | Performed by: INTERNAL MEDICINE

## 2022-07-29 PROCEDURE — 6370000000 HC RX 637 (ALT 250 FOR IP): Performed by: PHYSICAL MEDICINE & REHABILITATION

## 2022-07-29 PROCEDURE — 99223 1ST HOSP IP/OBS HIGH 75: CPT | Performed by: RADIOLOGY

## 2022-07-29 PROCEDURE — 6360000002 HC RX W HCPCS: Performed by: INTERNAL MEDICINE

## 2022-07-29 PROCEDURE — 74178 CT ABD&PLV WO CNTR FLWD CNTR: CPT

## 2022-07-29 PROCEDURE — 97535 SELF CARE MNGMENT TRAINING: CPT

## 2022-07-29 PROCEDURE — 85027 COMPLETE CBC AUTOMATED: CPT

## 2022-07-29 PROCEDURE — 2500000003 HC RX 250 WO HCPCS: Performed by: NURSE PRACTITIONER

## 2022-07-29 PROCEDURE — 6360000004 HC RX CONTRAST MEDICATION: Performed by: RADIOLOGY

## 2022-07-29 PROCEDURE — 2580000003 HC RX 258: Performed by: NURSE PRACTITIONER

## 2022-07-29 PROCEDURE — 6370000000 HC RX 637 (ALT 250 FOR IP): Performed by: INTERNAL MEDICINE

## 2022-07-29 PROCEDURE — 6370000000 HC RX 637 (ALT 250 FOR IP): Performed by: NURSE PRACTITIONER

## 2022-07-29 PROCEDURE — 85610 PROTHROMBIN TIME: CPT

## 2022-07-29 RX ORDER — ONDANSETRON 2 MG/ML
4 INJECTION INTRAMUSCULAR; INTRAVENOUS EVERY 6 HOURS PRN
Status: CANCELLED | OUTPATIENT
Start: 2022-07-29

## 2022-07-29 RX ORDER — CARVEDILOL 6.25 MG/1
6.25 TABLET ORAL 2 TIMES DAILY WITH MEALS
Status: DISCONTINUED | OUTPATIENT
Start: 2022-07-29 | End: 2022-08-03 | Stop reason: HOSPADM

## 2022-07-29 RX ORDER — L. ACIDOPHILUS/L.BULGARICUS 1MM CELL
2 TABLET ORAL 3 TIMES DAILY
Status: CANCELLED | OUTPATIENT
Start: 2022-07-29

## 2022-07-29 RX ORDER — HEPARIN SODIUM 1000 [USP'U]/ML
80 INJECTION, SOLUTION INTRAVENOUS; SUBCUTANEOUS PRN
Status: DISCONTINUED | OUTPATIENT
Start: 2022-07-29 | End: 2022-08-02

## 2022-07-29 RX ORDER — ACETAMINOPHEN 325 MG/1
650 TABLET ORAL EVERY 6 HOURS PRN
Status: CANCELLED | OUTPATIENT
Start: 2022-07-29

## 2022-07-29 RX ORDER — HEPARIN SODIUM 1000 [USP'U]/ML
80 INJECTION, SOLUTION INTRAVENOUS; SUBCUTANEOUS PRN
Status: DISCONTINUED | OUTPATIENT
Start: 2022-07-29 | End: 2022-07-29 | Stop reason: HOSPADM

## 2022-07-29 RX ORDER — CARVEDILOL 6.25 MG/1
6.25 TABLET ORAL 2 TIMES DAILY WITH MEALS
Status: CANCELLED | OUTPATIENT
Start: 2022-07-29

## 2022-07-29 RX ORDER — LOPERAMIDE HYDROCHLORIDE 2 MG/1
2 CAPSULE ORAL 4 TIMES DAILY PRN
Status: CANCELLED | OUTPATIENT
Start: 2022-07-29

## 2022-07-29 RX ORDER — CITALOPRAM 20 MG/1
20 TABLET ORAL DAILY
Status: DISCONTINUED | OUTPATIENT
Start: 2022-07-30 | End: 2022-08-03 | Stop reason: HOSPADM

## 2022-07-29 RX ORDER — HEPARIN SODIUM 1000 [USP'U]/ML
80 INJECTION, SOLUTION INTRAVENOUS; SUBCUTANEOUS ONCE
Status: COMPLETED | OUTPATIENT
Start: 2022-07-29 | End: 2022-07-29

## 2022-07-29 RX ORDER — HYDROCODONE BITARTRATE AND ACETAMINOPHEN 5; 325 MG/1; MG/1
1 TABLET ORAL EVERY 4 HOURS PRN
Status: DISCONTINUED | OUTPATIENT
Start: 2022-07-29 | End: 2022-08-03 | Stop reason: HOSPADM

## 2022-07-29 RX ORDER — POTASSIUM CHLORIDE 7.45 MG/ML
10 INJECTION INTRAVENOUS PRN
Status: CANCELLED | OUTPATIENT
Start: 2022-07-29

## 2022-07-29 RX ORDER — HEPARIN SODIUM 1000 [USP'U]/ML
80 INJECTION, SOLUTION INTRAVENOUS; SUBCUTANEOUS ONCE
Status: DISCONTINUED | OUTPATIENT
Start: 2022-07-29 | End: 2022-07-29 | Stop reason: ALTCHOICE

## 2022-07-29 RX ORDER — HEPARIN SODIUM 1000 [USP'U]/ML
40 INJECTION, SOLUTION INTRAVENOUS; SUBCUTANEOUS PRN
Status: DISCONTINUED | OUTPATIENT
Start: 2022-07-29 | End: 2022-08-02

## 2022-07-29 RX ORDER — ONDANSETRON 4 MG/1
4 TABLET, ORALLY DISINTEGRATING ORAL EVERY 8 HOURS PRN
Status: DISCONTINUED | OUTPATIENT
Start: 2022-07-29 | End: 2022-08-03 | Stop reason: HOSPADM

## 2022-07-29 RX ORDER — SODIUM CHLORIDE 0.9 % (FLUSH) 0.9 %
5-40 SYRINGE (ML) INJECTION PRN
Status: DISCONTINUED | OUTPATIENT
Start: 2022-07-29 | End: 2022-08-03 | Stop reason: HOSPADM

## 2022-07-29 RX ORDER — SODIUM CHLORIDE 0.9 % (FLUSH) 0.9 %
5-40 SYRINGE (ML) INJECTION EVERY 12 HOURS SCHEDULED
Status: DISCONTINUED | OUTPATIENT
Start: 2022-07-29 | End: 2022-08-03 | Stop reason: HOSPADM

## 2022-07-29 RX ORDER — HEPARIN SODIUM 10000 [USP'U]/100ML
5-30 INJECTION, SOLUTION INTRAVENOUS CONTINUOUS
Status: DISCONTINUED | OUTPATIENT
Start: 2022-07-29 | End: 2022-08-02

## 2022-07-29 RX ORDER — SODIUM CHLORIDE 0.9 % (FLUSH) 0.9 %
5-40 SYRINGE (ML) INJECTION EVERY 12 HOURS SCHEDULED
Status: CANCELLED | OUTPATIENT
Start: 2022-07-29

## 2022-07-29 RX ORDER — PANTOPRAZOLE SODIUM 40 MG/1
40 TABLET, DELAYED RELEASE ORAL
Status: CANCELLED | OUTPATIENT
Start: 2022-07-30

## 2022-07-29 RX ORDER — POLYETHYLENE GLYCOL 3350 17 G/17G
17 POWDER, FOR SOLUTION ORAL DAILY PRN
Status: CANCELLED | OUTPATIENT
Start: 2022-07-29

## 2022-07-29 RX ORDER — HEPARIN SODIUM 1000 [USP'U]/ML
80 INJECTION, SOLUTION INTRAVENOUS; SUBCUTANEOUS PRN
Status: CANCELLED | OUTPATIENT
Start: 2022-07-29

## 2022-07-29 RX ORDER — LEVOTHYROXINE SODIUM 88 UG/1
88 TABLET ORAL DAILY
Status: CANCELLED | OUTPATIENT
Start: 2022-07-30

## 2022-07-29 RX ORDER — LOPERAMIDE HYDROCHLORIDE 2 MG/1
2 CAPSULE ORAL 4 TIMES DAILY PRN
Status: DISCONTINUED | OUTPATIENT
Start: 2022-07-29 | End: 2022-08-03 | Stop reason: HOSPADM

## 2022-07-29 RX ORDER — L. ACIDOPHILUS/L.BULGARICUS 1MM CELL
2 TABLET ORAL 3 TIMES DAILY
Status: DISCONTINUED | OUTPATIENT
Start: 2022-07-29 | End: 2022-08-03 | Stop reason: HOSPADM

## 2022-07-29 RX ORDER — HEPARIN SODIUM 10000 [USP'U]/100ML
5-30 INJECTION, SOLUTION INTRAVENOUS CONTINUOUS
Status: CANCELLED | OUTPATIENT
Start: 2022-07-29

## 2022-07-29 RX ORDER — ONDANSETRON 2 MG/ML
4 INJECTION INTRAMUSCULAR; INTRAVENOUS EVERY 6 HOURS PRN
Status: DISCONTINUED | OUTPATIENT
Start: 2022-07-29 | End: 2022-08-03 | Stop reason: HOSPADM

## 2022-07-29 RX ORDER — SODIUM CHLORIDE 0.9 % (FLUSH) 0.9 %
5-40 SYRINGE (ML) INJECTION PRN
Status: CANCELLED | OUTPATIENT
Start: 2022-07-29

## 2022-07-29 RX ORDER — SODIUM CHLORIDE 9 MG/ML
INJECTION, SOLUTION INTRAVENOUS PRN
Status: DISCONTINUED | OUTPATIENT
Start: 2022-07-29 | End: 2022-08-03 | Stop reason: HOSPADM

## 2022-07-29 RX ORDER — ONDANSETRON 4 MG/1
4 TABLET, ORALLY DISINTEGRATING ORAL EVERY 8 HOURS PRN
Status: CANCELLED | OUTPATIENT
Start: 2022-07-29

## 2022-07-29 RX ORDER — FERROUS SULFATE 325(65) MG
325 TABLET ORAL EVERY OTHER DAY
Status: CANCELLED | OUTPATIENT
Start: 2022-07-31

## 2022-07-29 RX ORDER — HEPARIN SODIUM 1000 [USP'U]/ML
40 INJECTION, SOLUTION INTRAVENOUS; SUBCUTANEOUS PRN
Status: CANCELLED | OUTPATIENT
Start: 2022-07-29

## 2022-07-29 RX ORDER — FERROUS SULFATE 325(65) MG
325 TABLET ORAL EVERY OTHER DAY
Status: DISCONTINUED | OUTPATIENT
Start: 2022-07-31 | End: 2022-08-03 | Stop reason: HOSPADM

## 2022-07-29 RX ORDER — PANTOPRAZOLE SODIUM 40 MG/1
40 TABLET, DELAYED RELEASE ORAL
Status: DISCONTINUED | OUTPATIENT
Start: 2022-07-30 | End: 2022-08-03 | Stop reason: HOSPADM

## 2022-07-29 RX ORDER — CITALOPRAM 10 MG/1
20 TABLET ORAL DAILY
Status: CANCELLED | OUTPATIENT
Start: 2022-07-30

## 2022-07-29 RX ORDER — LEVOTHYROXINE SODIUM 88 UG/1
88 TABLET ORAL DAILY
Status: DISCONTINUED | OUTPATIENT
Start: 2022-07-30 | End: 2022-08-03 | Stop reason: HOSPADM

## 2022-07-29 RX ORDER — HYDROCODONE BITARTRATE AND ACETAMINOPHEN 5; 325 MG/1; MG/1
1 TABLET ORAL EVERY 4 HOURS PRN
Status: CANCELLED | OUTPATIENT
Start: 2022-07-29

## 2022-07-29 RX ORDER — POTASSIUM CHLORIDE 7.45 MG/ML
10 INJECTION INTRAVENOUS PRN
Status: DISCONTINUED | OUTPATIENT
Start: 2022-07-29 | End: 2022-08-03 | Stop reason: HOSPADM

## 2022-07-29 RX ORDER — HEPARIN SODIUM 10000 [USP'U]/100ML
5-30 INJECTION, SOLUTION INTRAVENOUS CONTINUOUS
Status: DISCONTINUED | OUTPATIENT
Start: 2022-07-29 | End: 2022-07-29

## 2022-07-29 RX ORDER — SODIUM CHLORIDE 9 MG/ML
INJECTION, SOLUTION INTRAVENOUS PRN
Status: CANCELLED | OUTPATIENT
Start: 2022-07-29

## 2022-07-29 RX ORDER — HEPARIN SODIUM 10000 [USP'U]/100ML
5-30 INJECTION, SOLUTION INTRAVENOUS CONTINUOUS
Status: DISCONTINUED | OUTPATIENT
Start: 2022-07-29 | End: 2022-07-29 | Stop reason: HOSPADM

## 2022-07-29 RX ORDER — POLYETHYLENE GLYCOL 3350 17 G/17G
17 POWDER, FOR SOLUTION ORAL DAILY PRN
Status: DISCONTINUED | OUTPATIENT
Start: 2022-07-29 | End: 2022-08-03 | Stop reason: HOSPADM

## 2022-07-29 RX ORDER — HEPARIN SODIUM 1000 [USP'U]/ML
40 INJECTION, SOLUTION INTRAVENOUS; SUBCUTANEOUS PRN
Status: DISCONTINUED | OUTPATIENT
Start: 2022-07-29 | End: 2022-07-29 | Stop reason: HOSPADM

## 2022-07-29 RX ORDER — HEPARIN SODIUM 1000 [USP'U]/ML
80 INJECTION, SOLUTION INTRAVENOUS; SUBCUTANEOUS ONCE
Status: CANCELLED | OUTPATIENT
Start: 2022-07-29 | End: 2022-07-29

## 2022-07-29 RX ORDER — ACETAMINOPHEN 325 MG/1
650 TABLET ORAL EVERY 6 HOURS PRN
Status: DISCONTINUED | OUTPATIENT
Start: 2022-07-29 | End: 2022-08-03 | Stop reason: HOSPADM

## 2022-07-29 RX ADMIN — CEFTRIAXONE SODIUM 1000 MG: 1 INJECTION, POWDER, FOR SOLUTION INTRAMUSCULAR; INTRAVENOUS at 14:49

## 2022-07-29 RX ADMIN — CITALOPRAM HYDROBROMIDE 20 MG: 10 TABLET ORAL at 10:23

## 2022-07-29 RX ADMIN — MICONAZOLE NITRATE: 2 POWDER TOPICAL at 21:04

## 2022-07-29 RX ADMIN — CARVEDILOL 6.25 MG: 6.25 TABLET, FILM COATED ORAL at 18:45

## 2022-07-29 RX ADMIN — ACETAMINOPHEN 500 MG: 500 TABLET ORAL at 10:22

## 2022-07-29 RX ADMIN — Medication 10 ML: at 10:26

## 2022-07-29 RX ADMIN — Medication 10 ML: at 01:13

## 2022-07-29 RX ADMIN — HEPARIN SODIUM AND DEXTROSE 18 UNITS/KG/HR: 10000; 5 INJECTION INTRAVENOUS at 12:54

## 2022-07-29 RX ADMIN — PANTOPRAZOLE SODIUM 40 MG: 40 TABLET, DELAYED RELEASE ORAL at 05:50

## 2022-07-29 RX ADMIN — IOPAMIDOL 50 ML: 612 INJECTION, SOLUTION INTRAVENOUS at 10:11

## 2022-07-29 RX ADMIN — HYDROCODONE BITARTRATE AND ACETAMINOPHEN 1 TABLET: 5; 325 TABLET ORAL at 01:12

## 2022-07-29 RX ADMIN — LACTOBACILLUS TAB 2 TABLET: TAB at 21:03

## 2022-07-29 RX ADMIN — LEVOTHYROXINE SODIUM 88 MCG: 88 TABLET ORAL at 10:22

## 2022-07-29 RX ADMIN — ACETAMINOPHEN 500 MG: 500 TABLET ORAL at 14:40

## 2022-07-29 RX ADMIN — CARVEDILOL 6.25 MG: 6.25 TABLET, FILM COATED ORAL at 10:23

## 2022-07-29 RX ADMIN — LACTOBACILLUS TAB 2 TABLET: TAB at 14:40

## 2022-07-29 RX ADMIN — SODIUM CHLORIDE, PRESERVATIVE FREE 10 ML: 5 INJECTION INTRAVENOUS at 21:04

## 2022-07-29 RX ADMIN — HEPARIN SODIUM 6190 UNITS: 1000 INJECTION, SOLUTION INTRAVENOUS; SUBCUTANEOUS at 12:56

## 2022-07-29 RX ADMIN — MICONAZOLE NITRATE: 2 POWDER TOPICAL at 10:25

## 2022-07-29 RX ADMIN — LACTOBACILLUS TAB 2 TABLET: TAB at 10:23

## 2022-07-29 RX ADMIN — FERROUS SULFATE TAB 325 MG (65 MG ELEMENTAL FE) 325 MG: 325 (65 FE) TAB at 10:23

## 2022-07-29 ASSESSMENT — ENCOUNTER SYMPTOMS
EYES NEGATIVE: 1
ABDOMINAL PAIN: 1
BACK PAIN: 0
STRIDOR: 0
CONSTIPATION: 0
PHOTOPHOBIA: 0
DIARRHEA: 0
BLOOD IN STOOL: 0
CHEST TIGHTNESS: 0
RESPIRATORY NEGATIVE: 1
VOMITING: 0
NAUSEA: 0
SHORTNESS OF BREATH: 0
WHEEZING: 0
GASTROINTESTINAL NEGATIVE: 1
COUGH: 0

## 2022-07-29 ASSESSMENT — PAIN DESCRIPTION - DESCRIPTORS
DESCRIPTORS: ACHING

## 2022-07-29 ASSESSMENT — PAIN DESCRIPTION - LOCATION
LOCATION: ABDOMEN

## 2022-07-29 ASSESSMENT — PAIN DESCRIPTION - ORIENTATION: ORIENTATION: RIGHT

## 2022-07-29 ASSESSMENT — PAIN SCALES - GENERAL
PAINLEVEL_OUTOF10: 0
PAINLEVEL_OUTOF10: 0
PAINLEVEL_OUTOF10: 4

## 2022-07-29 NOTE — PROGRESS NOTES
Per hospital policy for heparin dosing, since pt received DOAC within last 72 hours monitoring will be changed to aPTT instead of Anti-Xa levels for the first 72 hours.  If heparin therapy is to be continued beyond 72 hours, orders can be changed to monitor via Anti-Xa

## 2022-07-29 NOTE — PROGRESS NOTES
Physical Therapy Missed Treatment   Facility/Department: [address] [place] [address] Rehab R250/R250-01    NAME: Oh Rios    : 1932 (80 y.o.)  MRN: [id number]    Account: [de-identified]  Gender: female    All therapies on hold per GRIS Chun PTA, 22 at 11:32 AM

## 2022-07-29 NOTE — PROGRESS NOTES
Nutrition Assessment     Type and Reason for Visit: RD Nutrition Re-Screen/LOS    Nutrition Recommendations/Plan:   Modify Current Diet     Malnutrition Assessment:  Malnutrition Status: No malnutrition    Nutrition Assessment:  Pt appears stable from a nutritional standpoint, with verbalized good appetite, noted good intake at meals, with no nutritional complaints. To modify to KRISHNA diet. Nutrition Related Findings:   PMH-htn, CHF, CHARCOT JENA TOOTH NEUROPATHY ; adm s/p Abd Abscess - s/p Drainage Drain tube in place. +1 Gen & BLE edema noted. Wound Type: None    Current Nutrition Therapies:    ADULT DIET;  Regular    Anthropometric Measures:  Height: 5' (152.4 cm)  Current Body Wt:  164lb 6.4oz (adm, bedscale)  BMI:  33.3    Nutrition Diagnosis:   No nutrition diagnosis at this time     Nutrition Interventions:   Food and/or Nutrient Delivery: Modify Current Diet  Nutrition Education/Counseling: No recommendation at this time          Goals:     Goals: PO intake 75% or greater       Nutrition Monitoring and Evaluation:      Food/Nutrient Intake Outcomes: Food and Nutrient Intake  Physical Signs/Symptoms Outcomes: Weight, Biochemical Data, Fluid Status or Edema    Discharge Planning:          [place]by [place] RD, LD

## 2022-07-29 NOTE — PROGRESS NOTES
Subjective: The patient complains of  moderate to severe acute    Right sided abdominal pain partially relieved by rest, PT, OT, drain and exacerbated by recent illness and exertion. I am concerned about patients medical complexities and current active problems and barriers to progress including - recently presented with an abdominal mass felt to be either cancer or an abscess. Imaging was thinking it was more like an abscess. She was admitted to [facility] placed on antibiotics and a drain was placed under interventional radiology's guidance. I discussed current functional, rehabilitation, medical status with other rehabilitation providers including nursing and case management. According to recent nursing note, \" pATIENT UP TO br ENCOURAGED TO USE WALKER- DID WELL- SLOW BUT STEADY- WAS RELUCTANT TO USE WALKER. Salome Alas CALL LIGHT WITHIN REACH BEDSIDE TABLE WITH IN REACH. DENIES ANY PAIN dRAIN INTACT\". I am concerned about her occasional diarrhea and dizziness. I have added lactobacillus and Zofran. Still is having some abdominal pain at the drain site. She is very tough and does not like to take any medications unless is necessary. I had a long discussion with her and her son and we do feel that is necessary to improve her quality of life and ease her suffering. She will take scheduled Tylenol 3 a day and I will add as needed Norco if things get bad. She is due for repeat CT of the abdomen with possible drain removal.    ROS x10: The patient also complains of severely impaired mobility and activities of daily living. Otherwise no new problems with vision, hearing, nose, mouth, throat, dermal, cardiovascular, GI, , pulmonary, musculoskeletal, psychiatric or neurological. See Rehab H&P on Rehab chart dated . Vital signs:  /74   Pulse 58   Temp 98.1 °F (36.7 °C)   Resp 16   SpO2 95%   I/O:   PO/Intake:  fair PO intake, no problems observed or reported.     Bowel/Bladder: continent,  immodium for diarrhea and urinary urgency. General:  Patient is well developed, adequately nourished, non-obese and     well kempt. HEENT:    PERRLA, hearing intact to loud voice, external inspection of ear     and nose benign. Inspection of lips, tongue and gums    Musculoskeletal: No significant change in strength or tone. All joints stable. Inspection and palpation of digits and nails show no clubbing,       cyanosis or inflammatory conditions. Neuro/Psychiatric: Affect: flat. Alert and oriented to person, place and     situation. No significant change in deep tendon reflexes or     Sensation    Lungs:  Diminished, CTA-B. Respiration effort is normal at rest.     Heart:   S1 = S2, RRR. No loud murmurs. Abdomen:  Soft, non-tender, no enlargement of liver or spleen-75cc of bloody drainage in drainage bag-right abdomen- tenderness at the drain site  Extremities:  Trace lower extremity edema,    tenderness. Skin:   Intact to general survey, no visualized or palpated problems  right abd drain-scant drainage. Rehabilitation:  Physical therapy: FIMS:  Bed Mobility:      Transfers: Sit to Stand: Stand by assistance  Stand to sit: Contact guard assistance, Stand by assistance  Bed to Chair: Stand by assistance, WB Status: wbat  Ambulation  Surface: carpet  Device: Rolling Walker  Other Apparatus: O2, AFO, Left, Right  Assistance: Stand by assistance  Quality of Gait: Assist required for management of O2 tubing. Fwd flexed trunk. VCs to remain close to [employer] with change of direction and approach to chair. Gait Deviations: Slow Lea, Decreased step length  Distance: 50ft  Comments: Gait training at L shayy rail and R st cane SBA in linear direction 30ft. WC brought up after 30 ft.   More Ambulation?: No, Stairs  # Steps : 4  Stairs Height: 6\"  Rails: Bilateral  Assistance: Minimal assistance    FIMS:  ,  ,      Occupational therapy: FIMS:   ,  , Assessment: Pt is a 80 y.o. female with above mentioned deficits impairing ability to complete ADL's at reported baseline. Pt may benefit from OT services to address defiicts and maximize safety and function during ADL's. Speech therapy: FIMS:        Lab/X-ray studies reviewed, analyzed and discussed with patient and staff:   No results found for this or any previous visit (from the past 24 hour(s)). Previous extensive, complex labs, notes and diagnostics reviewed and analyzed. ALLERGIES:    Allergies as of 07/22/2022 - Fully Reviewed 07/22/2022   Allergen Reaction Noted    Latex  07/19/2017    Tape [adhesive tape]  06/28/2016      (please also verify by checking [facility])       Complex Physical Medicine & Rehab Issues Assess & Plan:   Severe abnormality of gait and mobility and impaired self-care and ADL's secondary to progressive weakness dt   CMT neuropathy . Functional and medical status reassessed regarding patients ability to participate in therapies and patient found to be able to participate in acute intensive comprehensive inpatient rehabilitation program including PT/OT to improve balance, ambulation, ADLs, and to improve the P/AROM. Therapeutic modifications regarding activities in therapies, place, amount of time per day and intensity of therapy made daily. In bed therapies or bedside therapies prn. Bowel with occasional diarrhea on antibiotics, and Bladder dysfunction monitoring neurogenic bladder:  frequent toileting, ambulate to bathroom with assistance, check post void residuals. Check for C.difficile x1 if >2 loose stools in 24 hours, continue bowel & bladder program.  Monitor bowel and bladder function. Lactinex 2 PO every AC. MOM prn, Brown Bomb prn, Glycerin suppository prn, enema prn.   Severe abdominal  pain as well as generalized OA pain,   Lumbar stenosis with neurogenic claudication: reassess pain every shift and prior to and after each therapy session, give Tylenol and prn Tylenol and Norco, modalities prn in therapy, Lidoderm, K-pad prn. Skin healing and breakdown risk:  continue pressure relief program.  Daily skin exams and reports from nursing. Severe fatigue due to nutritional and hydration deficiency: Add vitamin B12 vitamin D and CoQ10 continue to monitor I&Os, calorie counts prn, dietary consult prn. Add healthy HS snack. Acute episodic insomnia with situational adjustment disorder:  consider prn low dose Ambien, monitor for day time sedation. Add HS \"Tuck In\"  Falls risk elevated:  patient to use call light to get nursing assistance to get up, bed and chair alarm. Elevated DVT risk: progressive activities in PT, continue prophylaxis PORTILLO hose, elevation and Xarelto. Complex discharge planning: Plan for discharge Friday, August 5, 2022 to home alone with support from her son who lives close by and her daughter-in-law. We will also have home health care come out PT OT RN aide and push possibly a . Progress toward her final weekly team meeting    Monday to re-assess progress towards goals, discuss and address social, psychological and medical comorbidities and to address difficulties they may be having progressing in therapy. Patient and family education is in progress. The patient is to follow-up with their family physician after discharge. Complex Active General Medical Issues that complicate care Assess & Plan:     1. 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   Continue blood signs every shift focusing on heart rate, rhythm and blood pressure checks with orthostatic checks-monitoring the effect of exercise, therapy and posture. Consult hospitalist for backup medical and adjust/add medications (Xarelto, Coreg). Monitor heart rate and blood pressure as well as medications effects on vital signs before during and after therapy with especial focus on preventing orthostasis and falls risk. Hyponatremia-recheck BMP avoid excessive water  Anemia-Monitor vital signs monitor for orthostasis and tachycardia, check H&H prn. Vitamin B12 and iron-dose iron with food to prevent GI upset. Monitor for constipation. Monitor stools for blood. Hypothyroid-  Synthroid and titrate dosing. Follow vital signs, recheck TSH and thyroid studies as outpatient. 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 Osteoarthritis of lumbar spine with myelopathy-her extremity strengthening    Depression-emotional support provided daily, vitamin B12, encourage participation in rehabilitation support group and recreational therapy, adjust/add medications (B12, Celexa)  Abdominal mass abscess versus cancer-drain still in place pending cytology-patient is on IV Zosyn pending cultures. Closely monitor drainage from her drain site. GERD-Elevate head of bed after meals, monitor stools for blood, lowest effective dose of PPI, consider Tums. Yeast rash and immunosuppression-Micatin powder and Floranex       Focus on emotional health and caregiver involvement in care. Dax CT of abdomen consider removing drain if the mass and or abscess has resolved. Franklin Benson D.O., PM&R     Attending    [address]   Focus on endurance, activity pacing, reassessing rehab goals and discharge planning.

## 2022-07-29 NOTE — H&P
[facility]    HISTORY AND PHYSICAL EXAM    PATIENT NAME:  Landy Jensen    MRN:  [id number]  SERVICE DATE:  7/29/2022   SERVICE TIME:  11:48 AM    Primary Care Physician: Vani Gonzalez MD     SUBJECTIVE  CHIEF COMPLAINT:  abd pain    HPI:  Landy Jensen is a 80 y.o.,female who has PMH of DVT, PAF, HTN, pleural effusion, combined heart failure, ascending and descending aortic aneurysm, admitted on 7/22 to acute rehab after inpatient medical and surgical treatment for complaints of abdominal pain. Imaging included CT abdomen showed mass vs abscess in RLQ. Surgery and IR consulted, drainage conducted on 7/21 and culture of aspirated fluid was sent, began on IV zosyn at this time. While on rehab she continued to have persistent abdominal pain and stools 1-2 times per day. She was transitioned from zosyn to IV rocephin per ID for planned 2 weeks course. Repeat imaging was done today as drain stopped draining yesterday. 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 The thrombus was not seen on previous study. Patient has been on xarelto and will need heparin drip. Given her abdominal pathology and large thrombus patient would benefit from transfer to tertiary center given her medical and potential surgical complexities and high risk for bowel perforation. Admitted for further management pending transfer to tertiary center.        PAST MEDICAL HISTORY:    Past Medical History:   Diagnosis Date    Ascending aortic aneurysm ([facility] 6/23/2017    Charcot Arleen Tooth muscular atrophy     CHF (congestive heart failure) (HCC)     Chronic diastolic CHF (congestive heart failure) ([address] 4/1/2022    Depression     Descending aortic aneurysm ([facility] 6/23/2017    Essential hypertension 2/16/2018    Headache     HTN (hypertension) Impaired mobility and activities of daily living     Lumbar stenosis with neurogenic claudication     Myelopathy (HCC)     Osteoarthritis      PAST SURGICAL HISTORY:    Past Surgical History:   Procedure Laterality Date    JOINT REPLACEMENT Bilateral     knees    OTHER SURGICAL HISTORY Right 07/21/2022    cat scan guided abdominal mass aspiration with drain placement by Dr. Rachel Blanchard Left 02/25/2021    LEFT PLEURAL CATHETER INSERTION performed by Erasto Hanks MD at [place] [id number] Left 01/29/2021    total of 755 cc removed per Dr Iman Maxwell specimen sent to lab     FAMILY HISTORY:    Family History   Problem Relation Age of Onset    Arthritis Mother     Arthritis Father     High Blood Pressure Father      SOCIAL HISTORY:    Social History     Socioeconomic History    Marital status:       Spouse name: Not on file    Number of children: 2    Years of education: Not on file    Highest education level: Not on file   Occupational History    Not on file   Tobacco Use    Smoking status: Never    Smokeless tobacco: Never   Substance and Sexual Activity    Alcohol use: No     Alcohol/week: 0.0 standard drinks    Drug use: No    Sexual activity: Not on file   Other Topics Concern    Not on file   Social History Narrative    , Lives With: Alone, son lives down the street, dtr is in the area    Type of Home: [address]: One level    Home Access: Stairs to enter with rails- Number of Steps: 2- Rails: Both    Bathroom Shower/Tub: Tub/Shower unit, Bathroom Equipment: Grab bars in shower, Shower chair    Home Equipment: Rolling walker, Cane(Pt infrequemtly uses DME for ambulation and prefers to furniture walk in home)    ADL Assistance: Independent, [address] [place] [address] Responsibilities: Yes    Ambulation Assistance: Independent, Transfer Assistance: Independent    Additional Comments: Son stops over 2 times daily Social Determinants of Health     Financial Resource Strain: Not on file   Food Insecurity: Not on file   Transportation Needs: Not on file   Physical Activity: Not on file   Stress: Not on file   Social Connections: Not on file   Intimate Partner Violence: Not on file   Housing Stability: Not on file     MEDICATIONS:   Prior to Admission medications    Medication Sig Start Date End Date Taking? Authorizing Provider   meclizine (ANTIVERT) 12.5 MG tablet Take 12.5 mg by mouth 4 times daily as needed   Yes Historical Provider, MD   potassium chloride (KLOR-CON M) 20 MEQ extended release tablet Take 20 mEq by mouth daily (with breakfast)   Yes Historical Provider, MD   digoxin (LANOXIN) 125 MCG tablet TAKE 1 TABLET DAILY 4/4/22   ADRIENNE Osborn CNP   furosemide (LASIX) 20 MG tablet TAKE 1 TABLET DAILY 4/4/22   ADRIENNE Osborn CNP   amLODIPine (NORVASC) 5 MG tablet Take 1 tablet by mouth daily 4/1/22   Jorge LAKE Holiday,    pantoprazole (PROTONIX) 40 MG tablet TAKE 1 TABLET DAILY 2/11/22   ADRIENNE Nguyen CNP   carvedilol (COREG) 6.25 MG tablet TAKE 1 TABLET TWICE A DAY 1/17/22   ADRIENNE Colvin CNP   XARELTO 15 MG TABS tablet TAKE 1 TABLET DAILY 1/3/22   ADRIENNE Nguyen CNP   nitroGLYCERIN (NITROSTAT) 0.4 MG SL tablet up to max of 3 total doses.  If no relief after 1 dose, call 911. 3/1/21   ADRIENNE Sullivan NP   budesonide-formoterol [facility]) 160-4.5 MCG/ACT AERO Inhale 2 puffs into the lungs 2 times daily 3/1/21   ADRIENNE Sullivan - NP   ferrous sulfate (IRON 325) 325 (65 Fe) MG tablet Take 1 tablet by mouth 2 times daily (with meals) 3/1/21   ADRIENNE Sullivan NP   Carboxymethylcellulose Sodium (EYE DROPS OP) Apply 1 drop to eye as needed (Dry eyes) Indications: Systane     Historical Provider, MD   Boswellia-Glucosamine-Vit D (OSTEO BI-FLEX ONE PER DAY) TABS Take by mouth daily    Historical Provider, MD   Multiple Vitamins-Minerals (CENTRUM SILVER ULTRA WOMENS) TABS Take by mouth daily    Historical Provider, MD   vitamin B-12 (CYANOCOBALAMIN) 1000 MCG tablet Take 1,000 mcg by mouth daily    Historical Provider, MD   aspirin 81 MG tablet Take 81 mg by mouth daily     Historical Provider, MD   citalopram (CELEXA) 20 MG tablet Take 20 mg by mouth daily  4/1/16   Historical Provider, MD   SYNTHROID 88 MCG tablet Take 88 mcg by mouth daily  3/19/16   Historical Provider, MD       ALLERGIES: Latex and Tape [adhesive tape]    REVIEW OF SYSTEM:   12 point ROS negative unless indicated below or in the HPI    OBJECTIVE  PHYSICAL EXAM:   Physical Exam  Vitals reviewed. Constitutional:       Appearance: She is ill-appearing. HENT:      Head: Normocephalic and atraumatic. Nose: Nose normal.      Mouth/Throat:      Mouth: Mucous membranes are dry. Pharynx: Oropharynx is clear. Eyes:      Conjunctiva/sclera: Conjunctivae normal.   Cardiovascular:      Rate and Rhythm: Normal rate and regular rhythm. Pulses: Normal pulses. Pulmonary:      Effort: Pulmonary effort is normal. No respiratory distress. Breath sounds: Normal breath sounds. No stridor. No wheezing or rales. Abdominal:      General: Bowel sounds are normal. There is distension. Palpations: Abdomen is soft. Tenderness: There is abdominal tenderness. Comments: Abd drain   Musculoskeletal:         General: Normal range of motion. Cervical back: Normal range of motion. Skin:     General: Skin is warm and dry. Capillary Refill: Capillary refill takes less than 2 seconds. Neurological:      Mental Status: She is alert. Mental status is at baseline.    Psychiatric:         Mood and Affect: Mood normal.        /74   Pulse 58   Temp 98.1 °F (36.7 °C)   Resp 16   SpO2 95%     Vitals:    07/28/22 1118 07/28/22 1740 07/28/22 1952 07/29/22 0631   BP:  128/72 118/86 115/74   Pulse:  62 60 58   Resp: 16  16 16   Temp:   98.4 °F (36.9 °C) 98.1 °F (36.7 °C)

## 2022-07-29 NOTE — PLAN OF CARE
Problem: Discharge Planning  Goal: Discharge to home or other facility with appropriate resources  7/29/2022 1315 by Roland Gage RN  Outcome: Progressing  Flowsheets (Taken 7/29/2022 1311)  Discharge to home or other facility with appropriate resources: Arrange for needed discharge resources and transportation as appropriate  7/29/2022 0054 by Fernandez Harden RN  Outcome: Progressing     Problem: Safety - Adult  Goal: Free from fall injury  7/29/2022 1315 by Roland Gage RN  Outcome: Progressing  Flowsheets (Taken 7/29/2022 1309)  Free From Fall Injury: Instruct family/caregiver on patient safety  7/29/2022 0054 by Fernandez Harden RN  Outcome: Progressing     Problem: ABCDS Injury Assessment  Goal: Absence of physical injury  7/29/2022 1315 by Roland Gage RN  Outcome: Progressing  Flowsheets (Taken 7/29/2022 1309)  Absence of Physical Injury: Implement safety measures based on patient assessment  7/29/2022 0054 by Fernandez Harden RN  Outcome: Progressing  Flowsheets (Taken 7/28/2022 2105)  Absence of Physical Injury: Implement safety measures based on patient assessment     Problem: Chronic Conditions and Co-morbidities  Goal: Patient's chronic conditions and co-morbidity symptoms are monitored and maintained or improved  7/29/2022 1315 by Roland Gage RN  Outcome: Progressing  Flowsheets (Taken 7/29/2022 1311)  Care Plan - Patient's Chronic Conditions and Co-Morbidity Symptoms are Monitored and Maintained or Improved: Collaborate with multidisciplinary team to address chronic and comorbid conditions and prevent exacerbation or deterioration  7/29/2022 0054 by Fernandez Harden RN  Outcome: Progressing

## 2022-07-29 NOTE — PROGRESS NOTES
OCCUPATIONAL THERAPY  INPATIENT REHAB TREATMENT NOTE  [facility]      NAME: Naga Alonso  : 1932 (80 y.o.)  MRN: [id number]  CODE STATUS: Full Code  Room: R250/R250-01    Date of Service: 2022    Referring Physician: Dr. Qian Tavares Diagnosis: Impaired mobility and ADL's due to Charcot Jelly Fischer Tooth Neuropathy    Restrictions  Restrictions/Precautions  Restrictions/Precautions: Fall Risk  Required Braces or Orthoses?: No         Position Activity Restriction  Other position/activity restrictions: Abdominal drain    Patient's date of birth confirmed: Yes    SAFETY:  Safety Devices  Safety Devices in place: Yes  Type of devices: All fall risk precautions in place; Chair alarm in place; Left in chair    SUBJECTIVE:  Subjective: \" I love you so much, nice girl. \"    Pain at start of treatment: Yes    Pain at end of treatment: Yes    Location: where abdominal drain is  Nursing notified: Yes  RN: Keely  Intervention: Other: nothing at this time; Keely RN did look up pt protocol per abdominal drain- pt going to CT scan this morning- possible drain removal    COGNITION:  Orientation  Overall Orientation Status: Within Functional Limits  Orientation Level: Oriented to time;Oriented to situation;Oriented to person  Cognition  Overall Cognitive Status: Exceptions      Pt's current cognitive status is:  Comprehension: Max A  Expression: SBA  Social Interaction: Mod A  Problem Solving: Min A  Memory: Supervision    OBJECTIVE:         Grooming/Oral Hygiene  Assistance Level: Contact guard assist  Skilled Clinical Factors: Pt needed assist prn while brushing teeth but able to set self up once items are handed to her  Upper Extremity Bathing  Assistance Level: Supervision  Lower Extremity Bathing  Assistance Level:  Moderate assistance  Upper Extremity Dressing  Skilled Clinical Factors: hospital gown d/t CT test this day  Lower Extremity Dressing  Skilled Clinical Factors: No dressing d/t CT test  Putting On/Taking Off Footwear  Assistance Level: Dependent  Toileting  Skilled Clinical Factors: NT  Toilet Transfers  Skilled Clinical Factors: Toieting tasks not completed this day; pt competed sponge bath  Tub/Shower Transfers  Skilled Clinical Factors: sponge bath completed         Sit to Stand  Assistance Level: Minimal assistance  Stand to Sit  Assistance Level: Contact guard assist                 Education: educated pt on needing to wear gown for test this day              ASSESSMENT:  Assessment: Pt pleasant and very thankful through therapy session; needs demonstration and simple one to two word vc's for task to be completed d/t language barrier  Activity Tolerance: Patient tolerated treatment well      PLAN OF CARE:  Functional mobility training, Endurance training, Strengthening, Safety education & training, Patient/Caregiver education & training, Equipment evaluation, education, & procurement, Self-Care / ADL, Home management training  Continue per OT POC for planned d/c on 22    Patient goals : \"To be able to stand up and move around on my own. \"  Long Term Goal 1: Pt will increased BUE stength by 1/2 MMT grade. Long Term Goal 2: Pt will be IND with ECWS techniques. Long Term Goal 3: Pt will be Mod IND with LB dressing. Long Term Goal 4: Pt will be Mod IND for functional mobility. Therapy Time:   Individual Group Co-Treat   Time In 0830       Time Out 0930         Minutes 60                   ADL/IADL trainin minutes     Electronically signed by:     PRUDENCE Hannon,   2022, 9:27 AM

## 2022-07-29 NOTE — PROGRESS NOTES
Steps: 2- Rails: Both    Bathroom Shower/Tub: Tub/Shower unit, Bathroom Equipment: Grab bars in shower, Shower chair    Home Equipment: Rolling walker, Cane(Pt infrequemtly uses DME for ambulation and prefers to furniture walk in home)    ADL Assistance: Independent, [address]: Independent    Homemaking Responsibilities: Yes    Ambulation Assistance: Independent, Transfer Assistance: Independent    Additional Comments: Son stops over 2 times daily           Subjective/HPI  sitting in chair. Little abd discomfort. Daughter in room to translate. Abscess drain clotted. New CT reviewed with Dr. Neelam Ho. EKG:         Review of Systems:   Review of Systems   Constitutional: Negative. Negative for diaphoresis and fatigue. HENT: Negative. Eyes: Negative. Respiratory: Negative. Negative for cough, chest tightness, shortness of breath, wheezing and stridor. Cardiovascular: Negative. Negative for chest pain, palpitations and leg swelling. Gastrointestinal: Negative. Negative for blood in stool and nausea. Genitourinary: Negative. Musculoskeletal: Negative. Skin: Negative. Neurological:  Positive for dizziness. Negative for syncope, weakness and light-headedness. Hematological: Negative. Psychiatric/Behavioral: Negative. Physical Examination:    /74   Pulse 58   Temp 98.1 °F (36.7 °C)   Resp 16   SpO2 95%    Physical Exam   Constitutional: She appears healthy. No distress. HENT:   Normal cephalic and Atraumatic   Eyes: Pupils are equal, round, and reactive to light. Neck: Thyroid normal. No JVD present. No neck adenopathy. No thyromegaly present. Cardiovascular: Normal rate, normal heart sounds, intact distal pulses and normal pulses. An irregularly irregular rhythm present. Pulmonary/Chest: Effort normal and breath sounds normal. She has no wheezes. She has no rales. She exhibits no tenderness. Abdominal: Soft.  Bowel sounds are normal. There is no abdominal tenderness. Musculoskeletal:         General: No tenderness or edema. Normal range of motion. Cervical back: Normal range of motion and neck supple. Neurological: She is alert and oriented to person, place, and time. Skin: Skin is warm. No cyanosis. Nails show no clubbing.      LABS:  CBC:   Lab Results   Component Value Date/Time    WBC 11.6 07/24/2022 05:00 AM    RBC 3.92 07/24/2022 05:00 AM    HGB 10.7 07/24/2022 05:00 AM    HCT 32.7 07/24/2022 05:00 AM    MCV 83.5 07/24/2022 05:00 AM    MCH 27.3 07/24/2022 05:00 AM    MCHC 32.7 07/24/2022 05:00 AM    RDW 14.2 07/24/2022 05:00 AM     07/24/2022 05:00 AM     CBC with Differential:    Lab Results   Component Value Date/Time    WBC 11.6 07/24/2022 05:00 AM    RBC 3.92 07/24/2022 05:00 AM    HGB 10.7 07/24/2022 05:00 AM    HCT 32.7 07/24/2022 05:00 AM     07/24/2022 05:00 AM    MCV 83.5 07/24/2022 05:00 AM    MCH 27.3 07/24/2022 05:00 AM    MCHC 32.7 07/24/2022 05:00 AM    RDW 14.2 07/24/2022 05:00 AM    BANDSPCT 2 07/24/2022 05:00 AM    METASPCT 2 07/24/2022 05:00 AM    LYMPHOPCT 7.0 07/24/2022 05:00 AM    MONOPCT 5.8 07/24/2022 05:00 AM    BASOPCT 1.0 07/24/2022 05:00 AM    MONOSABS 0.7 07/24/2022 05:00 AM    LYMPHSABS 0.8 07/24/2022 05:00 AM    EOSABS 0.0 07/24/2022 05:00 AM    BASOSABS 0.1 07/24/2022 05:00 AM     CMP:    Lab Results   Component Value Date/Time     07/25/2022 05:33 AM    K 3.8 07/25/2022 05:33 AM    K 3.2 07/24/2022 05:00 AM     07/25/2022 05:33 AM    CO2 30 07/25/2022 05:33 AM    BUN 13 07/25/2022 05:33 AM    CREATININE 0.52 07/25/2022 05:33 AM    GFRAA >60.0 07/25/2022 05:33 AM    LABGLOM >60.0 07/25/2022 05:33 AM    GLUCOSE 114 07/25/2022 05:33 AM    PROT 6.0 07/24/2022 05:00 AM    LABALBU 2.7 07/24/2022 05:00 AM    CALCIUM 8.7 07/25/2022 05:33 AM    BILITOT 0.3 07/24/2022 05:00 AM    ALKPHOS 51 07/24/2022 05:00 AM    AST 20 07/24/2022 05:00 AM    ALT 19 07/24/2022 05:00 AM     BMP:    Lab Results Component Value Date/Time     07/25/2022 05:33 AM    K 3.8 07/25/2022 05:33 AM    K 3.2 07/24/2022 05:00 AM     07/25/2022 05:33 AM    CO2 30 07/25/2022 05:33 AM    BUN 13 07/25/2022 05:33 AM    LABALBU 2.7 07/24/2022 05:00 AM    CREATININE 0.52 07/25/2022 05:33 AM    CALCIUM 8.7 07/25/2022 05:33 AM    GFRAA >60.0 07/25/2022 05:33 AM    LABGLOM >60.0 07/25/2022 05:33 AM    GLUCOSE 114 07/25/2022 05:33 AM     Magnesium:    Lab Results   Component Value Date/Time    MG 1.7 07/24/2022 05:00 AM     Troponin:    Lab Results   Component Value Date/Time    TROPONINI <0.010 07/18/2022 12:30 PM        Active Hospital Problems    Diagnosis Date Noted    A-fib [facility]) [I48.91]      Priority: High    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 [Z74.09, Z78.9]      Priority: High    Hx of drainage of abscess [Z98.890] 07/27/2022     Priority: Medium    Generalized abdominal pain [R10.84] 07/27/2022     Priority: Medium    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] 07/27/2022     Priority: Medium    Hyponatremia [E87.1] 07/18/2022     Priority: Medium    Hypothyroid [E03.9] 07/18/2022     Priority: Medium    CHF (congestive heart failure) ([facility] [address] [I50.9] 07/18/2022     Priority: Medium    Lumbar stenosis with neurogenic claudication [M48.062] 06/02/2016     Priority: Medium    Chronic diastolic CHF (congestive heart failure) ([facility] [address] [I50.32] 04/01/2022     Priority: Low    Acute on chronic combined systolic (congestive) and diastolic (congestive) heart failure ([facility] [address] [I50.43] 02/09/2021     Priority: Low    Essential hypertension [I10] 02/16/2018     Priority: Low    Depression [F32. 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  New drain would pose significant risk of Bowel perforation due to location of new loculated abscess. discomfort. May need to consider re imaging. LVEF 60  Frequent PAF - In SR today. On low dose Xarelto   CAD- moderate - no angina. 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 We will need to stop Xarelto and start Heparin. Discussed with IM and Dr. Jessee Cordero. Pt will best benefit form tertiary care transfer. Had long discussion with pt and daughter. Pt is not apposed to heart nor abd surgery despite informing that she will be at high rsik of perioperative complication to include death.           Electronically signed by Matthew Mace MD on 7/29/2022 at 11:24 AM

## 2022-07-29 NOTE — PLAN OF CARE
Problem: Discharge Planning  Goal: Discharge to home or other facility with appropriate resources  7/29/2022 0054 by Anne Chopra RN  Outcome: Progressing  7/28/2022 1418 by Marquis Chinedu RN  Outcome: Progressing     Problem: Safety - Adult  Goal: Free from fall injury  7/29/2022 0054 by Anne Chopra RN  Outcome: Progressing  7/28/2022 1418 by Marquis Chinedu RN  Outcome: Progressing     Problem: ABCDS Injury Assessment  Goal: Absence of physical injury  7/29/2022 0054 by Anne Chopra RN  Outcome: Progressing  7/28/2022 1418 by Marquis Chinedu RN  Outcome: Progressing     Problem: Skin/Tissue Integrity  Goal: Absence of new skin breakdown  Description: 1. Monitor for areas of redness and/or skin breakdown  2. Assess vascular access sites hourly  3. Every 4-6 hours minimum:  Change oxygen saturation probe site  4. Every 4-6 hours:  If on nasal continuous positive airway pressure, respiratory therapy assess nares and determine need for appliance change or resting period.   7/29/2022 0054 by Anne Chopra RN  Outcome: Progressing  7/28/2022 1418 by Marquis Chinedu RN  Outcome: Progressing     Problem: Chronic Conditions and Co-morbidities  Goal: Patient's chronic conditions and co-morbidity symptoms are monitored and maintained or improved  7/29/2022 0054 by Anne Chopra RN  Outcome: Progressing  7/28/2022 1418 by Marquis Chinedu RN  Outcome: Progressing     Problem: Pain  Goal: Verbalizes/displays adequate comfort level or baseline comfort level  7/29/2022 0054 by Anne Chopra RN  Outcome: Progressing  7/28/2022 1418 by Marquis Chinedu RN  Outcome: Progressing

## 2022-07-29 NOTE — PROGRESS NOTES
Complete assessment done at 2105. At that time pt denied any abd pain or nausea. Accordian drain intact with no drainage in bag just scant in tubing. HL good easy flush Left AC intact 0 S/S of infection. Pt restedthrough shift- now pt on light need to urinate- pt assisted up to  BR w/contact  guard assist to w/c  pt with feeble steps and safeting cueing needed with tubing and drain. Back to bed pt C/O RLQ abd ache or \"4\" medicated with x1 norco per order. Drain intact with scant serosanguineous drainage  noted. Call light with in reach and bedside table with in reach.  Pt remains on 3L o2 per NC.

## 2022-07-29 NOTE — PROGRESS NOTES
pATIENT UP TO br ENCOURAGED TO USE WALKER- DID WELL- SLOW BUT STEADY- WAS RELUCTANT TO USE WALKER. Arianne Jones CALL LIGHT WITHIN REACH BEDSIDE TABLE WITH IN REACH. DENIES ANY PAIN dRAIN INTACT.

## 2022-07-29 NOTE — CONSULTS
CC: Abdominal pain     HPI:  Patient is a pleasant 5year-old woman who was admitted with abdominal pain and discomfort to have a cystic mass/abscess in the right abdomen adjacent to the ascending colon. Patient also has comorbidities including an abdominal aortic aneurysm, cystic mass in the pelvis. She was not a surgical candidate, and at the time we placed a percutaneous CT-guided drain in the right hemiabdominal abscess. Patient was sent to rehabilitation. Drain and stopped putting out any fluid yesterday. A CT scan of the abdomen and pelvis was performed which demonstrated that the drain has been pushed out to the periphery, there remains a large complex collection now mostly medial to the ascending colon with what appears to be more solid component and septations. Incidental note was made of a new large mural thrombus involving the aortic aneurysm at the ascending aortic arch down to the level of the lower thoracic aorta. Additionally the ascending aortic aneurysm and aortic root appear to be larger by about 2 mm now measuring 5.3 mm as opposed to 5.1 mm previously.      Family History   Problem Relation Age of Onset    Arthritis Mother     Arthritis Father     High Blood Pressure Father        Past Surgical History:   Procedure Laterality Date    JOINT REPLACEMENT Bilateral     knees    OTHER SURGICAL HISTORY Right 07/21/2022    cat scan guided abdominal mass aspiration with drain placement by Dr. De La Cruz Cast Left 02/25/2021    LEFT PLEURAL CATHETER INSERTION performed by Rolf Queen MD at [place] [address] Left 01/29/2021    total of 755 cc removed per Dr Moon Koehler specimen sent to lab        Past Medical History:   Diagnosis Date    Ascending aortic aneurysm ([facility] [address] 6/23/2017    Charcot Arleen Tooth muscular atrophy     CHF (congestive heart failure) ([facility])     Chronic diastolic CHF (congestive heart failure) ([address] 4/1/2022    Depression     Descending aortic aneurysm ([address] 6/23/2017 Essential hypertension 2/16/2018    Headache     HTN (hypertension)     Impaired mobility and activities of daily living     Lumbar stenosis with neurogenic claudication     Myelopathy ([facility] [address]     Osteoarthritis        Social History     Socioeconomic History    Marital status:      Spouse name: None    Number of children: 2    Years of education: None    Highest education level: None   Tobacco Use    Smoking status: Never    Smokeless tobacco: Never   Substance and Sexual Activity    Alcohol use: No     Alcohol/week: 0.0 standard drinks    Drug use: No   Social History Narrative    , Lives With: Alone, son lives down the street, dtr is in the area    Type of Home: [facility]  in [address]: One level    Home Access: Stairs to enter with rails- Number of Steps: 2- Rails: Both    Bathroom Shower/Tub: Tub/Shower unit, Bathroom Equipment: Grab bars in shower, Shower chair    Home Equipment: Rolling walker, Cane(Pt infrequemtly uses DME for ambulation and prefers to furniture walk in home)    ADL Assistance: Independent, [address] [place] [address] Responsibilities: Yes    Ambulation Assistance: Independent, Transfer Assistance: Independent    Additional Comments: Son stops over 2 times daily           Allergies   Allergen Reactions    Latex     Tape [Adhesive Tape]        No current facility-administered medications on file prior to encounter.      Current Outpatient Medications on File Prior to Encounter   Medication Sig Dispense Refill    meclizine (ANTIVERT) 12.5 MG tablet Take 12.5 mg by mouth 4 times daily as needed      potassium chloride (KLOR-CON M) 20 MEQ extended release tablet Take 20 mEq by mouth daily (with breakfast)      digoxin (LANOXIN) 125 MCG tablet TAKE 1 TABLET DAILY 90 tablet 3    furosemide (LASIX) 20 MG tablet TAKE 1 TABLET DAILY 90 tablet 3    amLODIPine (NORVASC) 5 MG tablet Take 1 tablet by mouth daily 90 tablet 3    pantoprazole (PROTONIX) 40 MG tablet TAKE 1 TABLET DAILY 90 tablet 3    carvedilol (COREG) 6.25 MG tablet TAKE 1 TABLET TWICE A  tablet 3    XARELTO 15 MG TABS tablet TAKE 1 TABLET DAILY 90 tablet 3    nitroGLYCERIN (NITROSTAT) 0.4 MG SL tablet up to max of 3 total doses. If no relief after 1 dose, call 911. 25 tablet 3    budesonide-formoterol (SYMBICORT) 160-4.5 MCG/ACT AERO Inhale 2 puffs into the lungs 2 times daily 1 Inhaler 3    ferrous sulfate (IRON 325) 325 (65 Fe) MG tablet Take 1 tablet by mouth 2 times daily (with meals) 30 tablet 3    Carboxymethylcellulose Sodium (EYE DROPS OP) Apply 1 drop to eye as needed (Dry eyes) Indications: Systane       Boswellia-Glucosamine-Vit D (OSTEO BI-FLEX ONE PER DAY) TABS Take by mouth daily      Multiple Vitamins-Minerals (CENTRUM SILVER ULTRA WOMENS) TABS Take by mouth daily      vitamin B-12 (CYANOCOBALAMIN) 1000 MCG tablet Take 1,000 mcg by mouth daily      aspirin 81 MG tablet Take 81 mg by mouth daily       citalopram (CELEXA) 20 MG tablet Take 20 mg by mouth daily       SYNTHROID 88 MCG tablet Take 88 mcg by mouth daily          Review of Systems   Constitutional: Negative. Negative for chills, diaphoresis and fever. HENT: Negative. Negative for congestion, ear pain, hearing loss and tinnitus. Eyes: Negative. Negative for photophobia. Respiratory: Negative. Negative for cough, shortness of breath and wheezing. Cardiovascular: Negative. Negative for chest pain, palpitations and leg swelling. Gastrointestinal:  Positive for abdominal pain. Negative for constipation, diarrhea, nausea and vomiting. Genitourinary: Negative. Negative for dysuria, flank pain, frequency, hematuria and urgency. Musculoskeletal: Negative. Negative for back pain and neck pain. Skin: Negative. Negative for rash. Allergic/Immunologic: Negative for environmental allergies. Neurological: Negative. Negative for dizziness, tremors, weakness and headaches.    Hematological: Does not bruise/bleed easily. Psychiatric/Behavioral: Negative. Negative for hallucinations and suicidal ideas. The patient is not nervous/anxious. OBJECTIVE:  /74   Pulse 58   Temp 98.1 °F (36.7 °C)   Resp 16   SpO2 95%     Physical Exam  Constitutional:       General: She is not in acute distress. Appearance: She is well-developed. She is not diaphoretic. HENT:      Head: Normocephalic and atraumatic. Nose: Nose normal.      Mouth/Throat:      Pharynx: No oropharyngeal exudate. Eyes:      General: No scleral icterus. Right eye: No discharge. Left eye: No discharge. Conjunctiva/sclera: Conjunctivae normal.   Neck:      Thyroid: No thyromegaly. Vascular: No JVD. Trachea: No tracheal deviation. Cardiovascular:      Rate and Rhythm: Normal rate and regular rhythm. Heart sounds: Normal heart sounds. No murmur heard. No friction rub. No gallop. Pulmonary:      Effort: No respiratory distress. Breath sounds: No stridor. Wheezing present. No rales. Chest:      Chest wall: No tenderness. Abdominal:      General: Bowel sounds are normal. There is distension. Palpations: Abdomen is soft. There is no mass. Tenderness: There is no abdominal tenderness. There is no guarding or rebound. Hernia: No hernia is present. Musculoskeletal:         General: No tenderness or deformity. Cervical back: Neck supple. Skin:     General: Skin is dry. Coloration: Skin is not pale. Findings: No erythema or rash. Neurological:      Mental Status: She is alert and oriented to person, place, and time. Cranial Nerves: No cranial nerve deficit. Psychiatric:         Behavior: Behavior normal.         Thought Content:  Thought content normal.         Judgment: Judgment normal.       Lab Results   Component Value Date/Time    WBC 11.6 07/24/2022 05:00 AM    HGB 10.7 07/24/2022 05:00 AM    HCT 32.7 07/24/2022 05:00 AM     07/24/2022 05:00 AM    MCV 83.5 07/24/2022 05:00 AM       CT ABDOMEN PELVIS W WO CONTRAST Additional Contrast? None    Result Date: 7/29/2022  1. The previously placed abscess drain in the right lower quadrant appears to be in the periphery of the abscess with loculations now more apparent in the fluid collection. The fluid collection appears to be grossly the same size as prior to drainage. 2.New mural thrombus is noted in the distal aortic arch extending to the lower thoracic aorta which was not seen on prior study. Findings discussed with Dr. Pascual 3. Note is again made of an aortic aneurysm involving the ascending aorta, aortic arch, and descending aorta. Based on current images, the ascending aorta appears to be 2 mm larger when compared to study dating February 17, 2021. The ascending aorta now measures 5.5 cm. Previously the arch measured 5.3 cm. 4.Note is again made of left lower small pleural effusion with bilateral basilar opacities which may represent atelectasis. 5.Note is again made of a large cystic mass in the pelvis. 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 CT Dose-Length Product (estimate related to radiation exposure from this exam):  1386.22  mGy*cm. CT ABDOMEN: Bibasilar atelectasis with left small pleural effusion. Underlying nodules cannot be excluded. The heart is enlarged. The liver is of normal size and attenuation without focal lesions. The spleen is of normal size and attenuation without focal lesions. Pancreas shows no signs of focal mass or peripancreatic fluid collection. Kidneys are normal in size. There is no hydronephrosis. No renal mass is identified.   Adrenal glands are unremarkable. Note is again made of a thoracoabdominal aortic aneurysm. The ascending aorta now measures 5.5 cm, this is larger when compared to prior study dating 5.3 cm. 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 This large thrombus was not seen on prior study. No significant retroperitoneal adenopathy or ascites is identified. Again seen is a cystic lesion involving the proximal ascending aorta which now appears to be more septated than prior. The previously placed drain is now in the periphery of the lesion. CT PELVIS:  A large cystic mass is again seen in the lower pelvis. ASSESSMENT ANDPLAN:    ASSESSMENT: Patient with right hemiabdominal abscess with possible mass, drain is no longer working since it is pushed out to the periphery and the remaining large possible fluid collection contains what appears to be multiple septations and is all medial to the ascending colon. Patient now also has a new large mural thrombus in the aorta and Dr. Amadeo Juarez will be starting a heparin drip since patient is already on anticoagulation. PLAN: Dr. Amadeo Juarez and I discussed the plan of care with the patient and the patient's daughter. The patient is not opposed to any intervention including cardiac and thoracic surgery, though her course is now more complex since she will have to be anticoagulated with a heparin drip for the new aortic aneurysm thrombus as well as the aortic aneurysm enlarging. 2 reentry vein on this complex fluid collection I explained that would be more complex since the location is now medial to the colon, as well as more dangerous since the patient is now on a heparin drip and anticoagulated which could lead to severe bleeding and bowel injury. Given on the circumstances, patient will be started on heparin drip, transferred to the medical floor, and transferred out to a tertiary care center.     Madeline Boas JOSHUA FORBES MD, Joce Burks

## 2022-07-29 NOTE — FLOWSHEET NOTE
EZEQUIEL Flor were in and talked with the patient's dtr regarding the blood clots in her aorta. The patient is in agreement to have the medication she needs. The daughter called her brother . The patient is a full code. She will be going to room 177.

## 2022-07-30 LAB
APTT: 76.4 SEC (ref 24.4–36.8)
APTT: 79.3 SEC (ref 24.4–36.8)

## 2022-07-30 PROCEDURE — 6370000000 HC RX 637 (ALT 250 FOR IP): Performed by: NURSE PRACTITIONER

## 2022-07-30 PROCEDURE — 85730 THROMBOPLASTIN TIME PARTIAL: CPT

## 2022-07-30 PROCEDURE — 2060000000 HC ICU INTERMEDIATE R&B

## 2022-07-30 PROCEDURE — 2580000003 HC RX 258: Performed by: NURSE PRACTITIONER

## 2022-07-30 PROCEDURE — 2700000000 HC OXYGEN THERAPY PER DAY

## 2022-07-30 PROCEDURE — 6360000002 HC RX W HCPCS: Performed by: NURSE PRACTITIONER

## 2022-07-30 PROCEDURE — 99232 SBSQ HOSP IP/OBS MODERATE 35: CPT | Performed by: INTERNAL MEDICINE

## 2022-07-30 PROCEDURE — 36415 COLL VENOUS BLD VENIPUNCTURE: CPT

## 2022-07-30 RX ADMIN — CARVEDILOL 6.25 MG: 6.25 TABLET, FILM COATED ORAL at 15:56

## 2022-07-30 RX ADMIN — CITALOPRAM HYDROBROMIDE 20 MG: 20 TABLET ORAL at 08:30

## 2022-07-30 RX ADMIN — MICONAZOLE NITRATE: 2 POWDER TOPICAL at 23:14

## 2022-07-30 RX ADMIN — MICONAZOLE NITRATE: 2 POWDER TOPICAL at 08:31

## 2022-07-30 RX ADMIN — CARVEDILOL 6.25 MG: 6.25 TABLET, FILM COATED ORAL at 08:30

## 2022-07-30 RX ADMIN — SODIUM CHLORIDE, PRESERVATIVE FREE 10 ML: 5 INJECTION INTRAVENOUS at 23:13

## 2022-07-30 RX ADMIN — HEPARIN SODIUM AND DEXTROSE 17 UNITS/KG/HR: 10000; 5 INJECTION INTRAVENOUS at 06:48

## 2022-07-30 RX ADMIN — PANTOPRAZOLE SODIUM 40 MG: 40 TABLET, DELAYED RELEASE ORAL at 06:45

## 2022-07-30 RX ADMIN — LACTOBACILLUS TAB 2 TABLET: TAB at 23:13

## 2022-07-30 RX ADMIN — CEFTRIAXONE SODIUM 1000 MG: 1 INJECTION, POWDER, FOR SOLUTION INTRAMUSCULAR; INTRAVENOUS at 15:58

## 2022-07-30 RX ADMIN — LACTOBACILLUS TAB 2 TABLET: TAB at 08:30

## 2022-07-30 RX ADMIN — LACTOBACILLUS TAB 2 TABLET: TAB at 15:56

## 2022-07-30 RX ADMIN — SODIUM CHLORIDE, PRESERVATIVE FREE 10 ML: 5 INJECTION INTRAVENOUS at 08:31

## 2022-07-30 RX ADMIN — LEVOTHYROXINE SODIUM 88 MCG: 88 TABLET ORAL at 08:30

## 2022-07-30 ASSESSMENT — ENCOUNTER SYMPTOMS
RESPIRATORY NEGATIVE: 1
NAUSEA: 0
WHEEZING: 0
CHEST TIGHTNESS: 0
GASTROINTESTINAL NEGATIVE: 1
COUGH: 0
STRIDOR: 0
BLOOD IN STOOL: 0
EYES NEGATIVE: 1
SHORTNESS OF BREATH: 0

## 2022-07-30 ASSESSMENT — PAIN SCALES - GENERAL
PAINLEVEL_OUTOF10: 0

## 2022-07-30 NOTE — CARE COORDINATION
PT AWAITING TX TO [facility]. PT WAS IN REHAB(7/22-7/29) AND WAS HOME PRIOR TO.  FOLLOWING [facility] FROM LAST HOSPITAL ADMIT. IMM GIVEN.     7/19      PCP: Mallory Delgadillo MD                                Date of Last Visit: UNSURE     [facility] Patient: No        [facility] Notified: no     If no PCP, list provided? N/A     Discharge Planning     Living Arrangements: independently at home     Who do you live with? ALONE, SON LIVES DOWN THE STREET/FAMILY HELPS A LOT     Who helps you with your care:  self or family     If lives at home:                Do you have any barriers navigating in your home? no     Patient can perform ADL? Yes--RIGHT NOW NEEDS A LOT OF ASSISTANCE     Current Services (outpatient and in home) :  None     Dialysis: No     Is transportation available to get to your appointments?  Yes--FAMILY     DME Equipment:  yes - HAS WALKER     Respiratory equipment: Continuous Oxygen  3 Liters      Respiratory provider:  yes - MEDICAL SERVICES     Pharmacy:  yes - [facility] No

## 2022-07-30 NOTE — PROGRESS NOTES
Hospitalist Daily Progress Note  Name: Yann Blanton  Age: 80 y.o. Gender: female  CodeStatus: Full Code  Allergies: Latex  Tape [address]    Chief Complaint:No chief complaint on file. Primary Care Provider: Makenna Waggoner MD    InpatientTreatment Team: Treatment Team: Attending Provider: Mary Reynoso MD; Tech: Adrienne Orellana; Consulting Physician: Ifeanyi Key MD; Consulting Physician: Rl Ochoa MD; Consulting Physician: Renetta Anand MD; Consulting Physician: Austen Jc MD; Registered Nurse: Tyesha Reddy, RN; Registered Nurse: Joya Lipscomb, RN; : Carolina Lieberman, RN; Utilization Reviewer: Rylan Vicente RN    Admission Date: 7/29/2022      Subjective: No chest pain, sob, nausea. Resting in bed. Physical Exam  Vitals and nursing note reviewed. Constitutional:       Appearance: Normal appearance. Cardiovascular:      Rate and Rhythm: Normal rate and regular rhythm. Pulmonary:      Effort: Pulmonary effort is normal.      Breath sounds: Normal breath sounds. Abdominal:      General: Bowel sounds are normal.      Palpations: Abdomen is soft. Musculoskeletal:         General: Normal range of motion. Skin:     General: Skin is warm and dry. Neurological:      Mental Status: She is alert and oriented to person, place, and time. Mental status is at baseline.        Medications:  Reviewed    Infusion Medications:    sodium chloride      heparin (porcine) 17 Units/kg/hr (07/30/22 8662)     Scheduled Medications:    carvedilol  6.25 mg Oral BID WC    cefTRIAXone (ROCEPHIN) IV  1,000 mg IntraVENous Q24H    citalopram  20 mg Oral Daily    [START ON 7/31/2022] ferrous sulfate  325 mg Oral Every Other Day    lactobacillus acidophilus  2 tablet Oral TID    levothyroxine  88 mcg Oral Daily    miconazole   Topical BID    pantoprazole  40 mg Oral QAM AC    sodium chloride flush  5-40 mL IntraVENous 2 times per day     PRN Meds: sodium chloride, acetaminophen, HYDROcodone 5 mg - acetaminophen, loperamide, ondansetron **OR** ondansetron, polyethylene glycol, potassium chloride, sodium chloride flush, heparin (porcine), heparin (porcine)    Labs:   Recent Labs     07/29/22  1200   WBC 8.8   HGB 10.2*   HCT 31.6*        No results for input(s): NA, K, CL, CO2, BUN, CREATININE, CALCIUM, PHOS in the last 72 hours. Invalid input(s): MAGNES  No results for input(s): AST, ALT, BILIDIR, BILITOT, ALKPHOS in the last 72 hours. Recent Labs     07/29/22  1200   INR 1.5     No results for input(s): Rebekah Flores in the last 72 hours. Urinalysis:   Lab Results   Component Value Date/Time    NITRU Negative 07/18/2022 12:30 PM    WBCUA 20-50 07/18/2022 12:30 PM    BACTERIA Negative 07/18/2022 12:30 PM    RBCUA 6-10 07/18/2022 12:30 PM    BLOODU SMALL 07/18/2022 12:30 PM    SPECGRAV 1.044 07/18/2022 12:30 PM    GLUCOSEU Negative 07/18/2022 12:30 PM       Radiology:   Most recent    Chest CT      WITH CONTRAST:Results for orders placed during the hospital encounter of 06/22/22    CT CHEST W CONTRAST    Narrative  CT of the Chest with intravenous contrast medium. HISTORY: Ascending aortic aneurysm. TECHNICAL FACTORS:    CT imaging of the chest was obtained and formatted as 5 mm contiguous axial images from the thoracic inlet through the adrenal glands. Sagittal and coronal reconstructions obtained during postprocessing. Intravenous contrast medium:  Isovue-300, 50 mL    Comparison:  CT chest, February 17, 2021, CT chest, January 25, 2021, March 15, 2019. FINDINGS:      Right lung: No nodules, masses, consolidation, pleural effusion, pneumothorax. Mild peripheral groundglass opacity, right middle, and right lower lobes. Left lung: No nodules, masses, consolidation, pleural effusion, pneumothorax. Subsegmental consolidation left lower lobe. Mild dependent groundglass opacity left upper lobe.     Lymph nodes: No hilar, mediastinal, or axillary lymph node enlargement. Thoracic aorta: AP and transverse dimension, ascending thoracic aorta, at level of pulmonary arterial bifurcation, measures 4.6 x 4.9 cm. This compares with prior measurement of 4.5 x 4.5 cm, in February, 2021. Cardiac Size: Enlarged. Pericardial effusion: None. Upper abdomen:Limited imaging upper abdomen shows no gross anomaly. Musculoskeletal:No osteoblastic, and no osteolytic lesions. Impression  Ascending thoracic aortic aneurysm measuring 4.6 x 4.9 cm cyst discussed. Cardiomegaly. Groundglass opacities as described may reflect edema, or other inflammatory or infectious etiologies. All CT scans at this facility use dose modulation, iterative reconstruction, and/or weight based dosing when appropriate to reduce radiation dose to as low as reasonably achievable. WITHOUT CONTRAST: No results found for this or any previous visit. CXR      2-view: Results for orders placed during the hospital encounter of 01/25/21    XR CHEST (2 VW)    Impression  1. No evidence of pneumothorax. Resolution of left pleural effusion. Cardiac silhouette remains enlarged. Left lower lobe hazy opacity may represent atelectasis versus pneumonia or infiltrate. COMPARISON: No prior studies available for comparison. DIAGNOSIS:  Post left thoracentesis. Dr. Gold Myers to read. COMMENTS: I50.43 Acute on chronic combined systolic and diastolic CHF (congestive heart failure) (HCC) ICD10    TECHNIQUE: XR CHEST (2 VW)    FINDINGS:  Left lower lobe opacity may represent atelectasis versus pneumonia or infiltrate. Interval resolution of left pleural effusion. No evidence of pneumothorax. Cardiac silhouette is enlarged. Visualized soft tissues, and osseous structures are  unremarkable. Portable: Results for orders placed during the hospital encounter of 02/09/21    XR CHEST PORTABLE    Impression  1. No evidence of pneumothorax.  Near complete resolution of the left pleural effusion. Persistent left lower lobe opacification, may represent pneumonia, though underlying nodule or mass cannot be excluded. There is vascular congestion consistent with  mild pulmonary edema. COMPARISON: February 13, 2021    DIAGNOSIS:  post left thoracentesis- show Dr Iline Najjar: I50.43 Acute on chronic combined systolic (congestive) and diastolic (congestive) heart failure ([facility] [address] ICD10    TECHNIQUE: XR CHEST PORTABLE    FINDINGS:  Left lower lobe opacity may represent pneumonia though underlying nodule or mass cannot be excluded. No evidence of pneumothorax. Near complete resolution of the left pleural effusion. Increase in vascular congestion, consistent with pulmonary edema. Cardiac silhouette remains enlarged. Visualized soft tissues, and osseous structures are unremarkable. Echo No results found for this or any previous visit. Assessment/Plan:    Active Hospital Problems    Diagnosis Date Noted    Abscess [L02.91] 07/29/2022     Priority: Medium     Enlarging AAA with new large mural thrombus: Failed xarelto treatment. start heparin drip. Cardiology to follow closely. R shayy abdominal abscess with possible mass: CT scan abdomen showed pericolic abscess, fluid aspiration and drain placed on 7/19 with improvement of pain. ID on consult, continue rocephin. PRN pain medications. Hem/onc consult. Drain stopped draining yesterday. CT repeated today. Continues to have large cystic mass. IR to follow. Transfer to tertiary center once accepted given complexity of complex fluid collection and high surgical risk along with anticoagulation. Cytology was negative for malignancy. CHF, PAF HTN: Cardiology consult. Not in exacerbation. Continue current regimen. Stop xarelto and start heparin drip     Anemia: follow H/H     Hypothyroidism: Continue home levothyroxine dose     Language barrier: daughter present.  Utilize language line when needed however patient does refuse Chronic respiratory failure: on 3lNC. Same as home. Continue.      Dispo - 7/30 - d/w [facility] transfer center, accepted to transfer main [place]    Electronically signed by Manoj Ware MD on 7/30/2022 at 11:54 AM

## 2022-07-30 NOTE — PROGRESS NOTES
Progress Note  Patient: Lennon Galeazzi  Unit/Bed: J970/K932-99  YOB: 1932  MRN: [id number]  Acct: [de-identified]   Admitting Diagnosis: Abscess [L02.91]  Admit Date:  7/29/2022  Hospital Day: 1    Chief Complaint: PAF    Histories:  Past Medical History:   Diagnosis Date    Ascending aortic aneurysm ([facility] [address] 6/23/2017    Charcot Arleen Tooth muscular atrophy     CHF (congestive heart failure) ([facility])     Chronic diastolic CHF (congestive heart failure) ([facility] [address] 4/1/2022    Depression     Descending aortic aneurysm ([facility] [address] 6/23/2017    Essential hypertension 2/16/2018    Headache     HTN (hypertension)     Impaired mobility and activities of daily living     Lumbar stenosis with neurogenic claudication     Myelopathy ([facility] [address]     Osteoarthritis      Past Surgical History:   Procedure Laterality Date    JOINT REPLACEMENT Bilateral     knees    OTHER SURGICAL HISTORY Right 07/21/2022    cat scan guided abdominal mass aspiration with drain placement by Dr. Pedro Matthews Left 02/25/2021    LEFT PLEURAL CATHETER INSERTION performed by Omar Crowe MD at [place] [address] Left 01/29/2021    total of 755 cc removed per Dr Seth Richter specimen sent to lab     Family History   Problem Relation Age of Onset    Arthritis Mother     Arthritis Father     High Blood Pressure Father      Social History     Socioeconomic History    Marital status:       Spouse name: None    Number of children: 2    Years of education: None    Highest education level: None   Tobacco Use    Smoking status: Never    Smokeless tobacco: Never   Substance and Sexual Activity    Alcohol use: No     Alcohol/week: 0.0 standard drinks    Drug use: No   Social History Narrative    , Lives With: Alone, son lives down the street, dtr is in the area    Type of Home: [facility]  in [address]: One level    Home Access: Stairs to enter with rails- Number of Steps: 2- Rails: Both    Bathroom Shower/Tub: Tub/Shower unit, Bathroom Equipment: Grab bars in shower, Shower chair    Home Equipment: Rolling walker, Cane(Pt infrequemtly uses DME for ambulation and prefers to furniture walk in home)    ADL Assistance: Independent, [address]: [address] [facility] Responsibilities: Yes    Ambulation Assistance: Independent, Transfer Assistance: Independent    Additional Comments: Son stops over 2 times daily           Subjective/HPI    7/30/22  Patient laying in bed looks comfortable  Daughter at bedside updated on patient's condition  Daughter very inquisitive asking for details about her mom's condition  Spoke to daughter and updated on patient's current patient's condition  Patient not on telemetry she refuses to wear the monitor    7/29/22  sitting in chair. Little abd discomfort. Daughter in room to translate. Abscess drain clotted. New CT reviewed with Dr. Sai Blank. EKG:         Review of Systems:   Review of Systems   Constitutional: Negative. Negative for diaphoresis and fatigue. HENT: Negative. Eyes: Negative. Respiratory: Negative. Negative for cough, chest tightness, shortness of breath, wheezing and stridor. Cardiovascular: Negative. Negative for chest pain, palpitations and leg swelling. Gastrointestinal: Negative. Negative for blood in stool and nausea. Genitourinary: Negative. Musculoskeletal: Negative. Skin: Negative. Neurological:  Positive for dizziness. Negative for syncope, weakness and light-headedness. Hematological: Negative. Psychiatric/Behavioral: Negative. Physical Examination:    /66   Pulse 65   Temp 98.4 °F (36.9 °C) (Oral)   Resp 20   Ht 5' (1.524 m)   Wt 167 lb 14.4 oz (76.2 kg)   SpO2 98%   BMI 32.79 kg/m²    Physical Exam   Constitutional: She appears healthy. No distress. HENT:   Normal cephalic and Atraumatic   Eyes: Pupils are equal, round, and reactive to light. Neck: Thyroid normal. No JVD present.  No neck adenopathy. No thyromegaly present. Cardiovascular: Normal rate, normal heart sounds, intact distal pulses and normal pulses. An irregularly irregular rhythm present. Pulmonary/Chest: Effort normal and breath sounds normal. She has no wheezes. She has no rales. She exhibits no tenderness. Abdominal: Soft. Bowel sounds are normal. There is no abdominal tenderness. Musculoskeletal:         General: No tenderness or edema. Normal range of motion. Cervical back: Normal range of motion and neck supple. Neurological: She is alert and oriented to person, place, and time. Skin: Skin is warm. No cyanosis. Nails show no clubbing.      LABS:  CBC:   Lab Results   Component Value Date/Time    WBC 8.8 07/29/2022 12:00 PM    RBC 3.72 07/29/2022 12:00 PM    HGB 10.2 07/29/2022 12:00 PM    HCT 31.6 07/29/2022 12:00 PM    MCV 85.0 07/29/2022 12:00 PM    MCH 27.4 07/29/2022 12:00 PM    MCHC 32.2 07/29/2022 12:00 PM    RDW 14.4 07/29/2022 12:00 PM     07/29/2022 12:00 PM     CBC with Differential:    Lab Results   Component Value Date/Time    WBC 8.8 07/29/2022 12:00 PM    RBC 3.72 07/29/2022 12:00 PM    HGB 10.2 07/29/2022 12:00 PM    HCT 31.6 07/29/2022 12:00 PM     07/29/2022 12:00 PM    MCV 85.0 07/29/2022 12:00 PM    MCH 27.4 07/29/2022 12:00 PM    MCHC 32.2 07/29/2022 12:00 PM    RDW 14.4 07/29/2022 12:00 PM    BANDSPCT 2 07/24/2022 05:00 AM    METASPCT 2 07/24/2022 05:00 AM    LYMPHOPCT 7.0 07/24/2022 05:00 AM    MONOPCT 5.8 07/24/2022 05:00 AM    BASOPCT 1.0 07/24/2022 05:00 AM    MONOSABS 0.7 07/24/2022 05:00 AM    LYMPHSABS 0.8 07/24/2022 05:00 AM    EOSABS 0.0 07/24/2022 05:00 AM    BASOSABS 0.1 07/24/2022 05:00 AM     CMP:    Lab Results   Component Value Date/Time     07/25/2022 05:33 AM    K 3.8 07/25/2022 05:33 AM    K 3.2 07/24/2022 05:00 AM     07/25/2022 05:33 AM    CO2 30 07/25/2022 05:33 AM    BUN 13 07/25/2022 05:33 AM    CREATININE 0.52 07/25/2022 05:33 AM    GFRAA >60.0 07/25/2022 05:33 AM    LABGLOM >60.0 07/25/2022 05:33 AM    GLUCOSE 114 07/25/2022 05:33 AM    PROT 6.0 07/24/2022 05:00 AM    LABALBU 2.7 07/24/2022 05:00 AM    CALCIUM 8.7 07/25/2022 05:33 AM    BILITOT 0.3 07/24/2022 05:00 AM    ALKPHOS 51 07/24/2022 05:00 AM    AST 20 07/24/2022 05:00 AM    ALT 19 07/24/2022 05:00 AM     BMP:    Lab Results   Component Value Date/Time     07/25/2022 05:33 AM    K 3.8 07/25/2022 05:33 AM    K 3.2 07/24/2022 05:00 AM     07/25/2022 05:33 AM    CO2 30 07/25/2022 05:33 AM    BUN 13 07/25/2022 05:33 AM    LABALBU 2.7 07/24/2022 05:00 AM    CREATININE 0.52 07/25/2022 05:33 AM    CALCIUM 8.7 07/25/2022 05:33 AM    GFRAA >60.0 07/25/2022 05:33 AM    LABGLOM >60.0 07/25/2022 05:33 AM    GLUCOSE 114 07/25/2022 05:33 AM     Magnesium:    Lab Results   Component Value Date/Time    MG 1.7 07/24/2022 05:00 AM     Troponin:    Lab Results   Component Value Date/Time    TROPONINI <0.010 07/18/2022 12:30 PM        Active Hospital Problems    Diagnosis Date Noted    Abscess [L02.91] 07/29/2022     Priority: Medium        Assessment/Plan:  Abd Abscess - s/p Drainage - clotted off. New drain would pose significant risk of Bowel perforation due to location of new loculated abscess. discomfort. May need to consider re imaging. LVEF 60  Frequent PAF - In SR today. On low dose Xarelto   CAD- moderate - no angina. 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 We will need to stop Xarelto and start Heparin. Discussed with IM and Dr. Kelton Vrigen. Pt will best benefit form tertiary care transfer. Had long discussion with pt and daughter. Pt is not apposed to heart nor abd surgery despite informing that she will be at high rsik of perioperative complication to include death.    No new recommendations

## 2022-07-30 NOTE — PROGRESS NOTES
Hematology/Oncology   Progress Note        CHIEF COMPLAINT/HPI:  Patient known to us from previous consultation in Rehab . Patient was noted to have Pelvic abscess which was drained by IR and patient has a drainage tube. She is on broadspectrum antibioitcs . Patient complains of diarrhea . Difficult to communicate with the patient because of language barrier . CT scan of abdomen and pelvis showed a large Cystic mass in lower pelvis . We may be dealing with an abscess or malignancy . REVIEW OF SYSTEMS:    Unremarkable except for symptoms mentioned in HPI.     Current Inpatient Medications:    Current Facility-Administered Medications   Medication Dose Route Frequency Provider Last Rate Last Admin    0.9 % sodium chloride infusion   IntraVENous PRN ADRIENNE Richter NP        acetaminophen (TYLENOL) tablet 650 mg  650 mg Oral Q6H PRN ADRIENNE Richter NP        carvedilol (COREG) tablet 6.25 mg  6.25 mg Oral BID WC ADRIENNE Richter NP   6.25 mg at 07/30/22 0830    cefTRIAXone (ROCEPHIN) 1,000 mg in dextrose 5 % 50 mL IVPB mini-bag  1,000 mg IntraVENous Q24H ADRIENNE Richter NP        citalopram (CELEXA) tablet 20 mg  20 mg Oral Daily ADRIENNE Richter NP   20 mg at 07/30/22 0830    [START ON 7/31/2022] ferrous sulfate (IRON 325) tablet 325 mg  325 mg Oral Every Other Day ADRIENNE Richter NP        HYDROcodone-acetaminophen ([address]) 5-325 MG per tablet 1 tablet  1 tablet Oral Q4H PRN ADRIENNE Richter NP        lactobacillus acidophilus [facility]) 2 tablet  2 tablet Oral TID ADRIENNE Richter NP   2 tablet at 07/30/22 0830    levothyroxine (SYNTHROID) tablet 88 mcg  88 mcg Oral Daily ADRIENNE Richter NP   88 mcg at 07/30/22 0830    loperamide (IMODIUM) capsule 2 mg  2 mg Oral 4x Daily PRN ADRIENEN Richter NP        miconazole (MICOTIN) 2 % powder   Topical BID ADRIENNE Richter NP   Given at 07/30/22 0831    ondansetron (ZOFRAN-ODT) disintegrating tablet 4 mg  4 mg Oral Q8H PRN Michelle Ala, APRN - NP        Or    ondansetron [facility]) injection 4 mg  4 mg IntraVENous Q6H PRN Michelle Ala, APRN - NP        pantoprazole (PROTONIX) tablet 40 mg  40 mg Oral QAM AC Michelle Ala, APRN - NP   40 mg at 07/30/22 0645    polyethylene glycol (GLYCOLAX) packet 17 g  17 g Oral Daily PRN Michelle Ala, APRN - NP        potassium chloride 10 mEq/100 mL IVPB (Peripheral Line)  10 mEq IntraVENous PRN Michelle Ala, APRN - NP        sodium chloride flush 0.9 % injection 5-40 mL  5-40 mL IntraVENous 2 times per day Michelle Ala, APRN - NP   10 mL at 07/30/22 0831    sodium chloride flush 0.9 % injection 5-40 mL  5-40 mL IntraVENous PRN Michelle Ala, APRN - NP        heparin (porcine) injection 3,100 Units  40 Units/kg IntraVENous PRN Michelle Ala, APRN - NP        heparin (porcine) injection 6,190 Units  80 Units/kg IntraVENous PRN Michelle Ala, APRN - NP        heparin 25,000 units in dextrose 5% 250 mL (premix) infusion  5-30 Units/kg/hr IntraVENous Continuous Michelle Ala, APRN - NP 13.2 mL/hr at 07/30/22 0648 17 Units/kg/hr at 07/30/22 0648       PHYSICAL EXAM:    EYES:  Lids and lashes normal, pupils equal, round and reactive to light, extra ocular muscles intact, sclera clear, conjunctiva pale,     ENT:  Normocephalic, without obvious abnormality, atraumatic, sinuses nontender on palpation, external ears without lesions, oral pharynx with moist mucus membranes, tonsils without erythema or exudates, gums normal and good dentition.     NECK:  Supple, symmetrical, trachea midline, no adenopathy, thyroid symmetric, not enlarged and no tenderness, skin normal    CHEST:    LUNGS:  No increased work of breathing, good air exchange, clear to auscultation bilaterally, no crackles or wheezing    CARDIOVASCULAR:  Normal apical impulse, regular rate and rhythm, normal S1 and S2, no S3 or S4, and no murmur noted    ABDOMEN:  Obese No scars, normal bowel sounds, soft, non-tender, no masses palpated, no hepatosplenomegally. YOUSIF drain noted     MUSCULOSKELETAL:  There is no redness, warmth, or swelling of the joints. Full range of motion noted. Motor strength is 5 out of 5 all extremities bilaterally. Tone is normal.  EXTREMITIES: No edema or calf tenderness     NEURO:No focal neuro deficit. DATA:      PT/INR:  No results found for: PTINR  PTT:    Lab Results   Component Value Date/Time    APTT 79.3 07/30/2022 05:40 AM     CMP:    Lab Results   Component Value Date/Time     07/25/2022 05:33 AM    K 3.8 07/25/2022 05:33 AM    K 3.2 07/24/2022 05:00 AM     07/25/2022 05:33 AM    CO2 30 07/25/2022 05:33 AM    BUN 13 07/25/2022 05:33 AM    PROT 6.0 07/24/2022 05:00 AM     Magnesium:    Lab Results   Component Value Date/Time    MG 1.7 07/24/2022 05:00 AM     Phosphorus:  No components found for: PO4  Calcium:  No results found for: CA  CBC:    Lab Results   Component Value Date/Time    WBC 8.8 07/29/2022 12:00 PM    RBC 3.72 07/29/2022 12:00 PM    HGB 10.2 07/29/2022 12:00 PM    HCT 31.6 07/29/2022 12:00 PM    MCV 85.0 07/29/2022 12:00 PM    RDW 14.4 07/29/2022 12:00 PM     07/29/2022 12:00 PM     DIFF:    Lab Results   Component Value Date/Time    MCV 85.0 07/29/2022 12:00 PM    RDW 14.4 07/29/2022 12:00 PM      LDH:    Lab Results   Component Value Date/Time     02/16/2021 04:52 AM     Uric Acid:  No components found for: URIC    EKG Reviewed  Appropriate Radiology Reviewed      Pathology: Reviewed where indicated      ASSESSMENT:  Principal Problem:    Abscess  Resolved Problems:    * No resolved hospital problems.  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    Headache    Depression    Thoracic aortic aneurysm without rupture (HCC)    Descending aortic aneurysm (HCC)    Dizziness    Shortness of breath    Essential hypertension    Acute on chronic combined systolic and diastolic CHF (congestive heart failure) (HCC)    Gait abnormality    A-fib (HCC)    Pleural effusion    Hypoxia    Acute pulmonary edema (HCC)    Acute on chronic combined systolic (congestive) and diastolic (congestive) heart failure (HCC)    Impaired mobility    Acute cystitis with hematuria    Chronic diastolic CHF (congestive heart failure) (HCC)    Right lower quadrant abdominal pain    Hyponatremia    Hypokalemia    Anemia    CHF (congestive heart failure) (HCC)    Hypothyroid    Abdominal mass    Central retinal vein occlusion, left eye, stable    Hx of drainage of abscess    Generalized abdominal pain    Streptococcus viridans infection    Aortic thrombus (HCC)    Abscess       Pelvic mass . Abscess VS Malignancy . Normocytic Normochromic anemia secondary to anemia    of   chronic disease     PLAN:  Please continue antibiotics. Agree with transfer to [facility] care to consider surgery for pelvic mass.            Electronically signed by Enrique Leon MD on 7/30/22 at 9:28 AM EDT

## 2022-07-30 NOTE — H&P
Attending Supervising [de-identified] Attestation Statement  The patient is a 80 y.o. female. I have performed a history and physical examination of the patient. I discussed the case with the nurse practitioner. I reviewed the patient's Past Medical History, Past Surgical History, Medications, and Allergies. Physical Exam:  Vitals:    07/29/22 1841   BP: 110/60   Pulse: 63   Resp: 16   Temp: 98.1 °F (36.7 °C)   TempSrc: Oral   SpO2: 91%       Gen: nad,alert  Abd: soft, nt, nd    Impression/Plan  I reviewed and agree with the findings and plan documented in her note . D/W Dr. Miles Merino, cardiology, plan to transfer to [facility], accepted to [facility] for transfer for loculated abscess, AAA with mural thrombus. On heparin gtt and iv abx.     Electronically signed by Lor Giron MD on 7/29/22 at 11:53 PM EDT

## 2022-07-31 LAB
APTT: 61.1 SEC (ref 24.4–36.8)
APTT: 97.9 SEC (ref 24.4–36.8)
HCT VFR BLD CALC: 30.2 % (ref 37–47)
HEMOGLOBIN: 10.1 G/DL (ref 12–16)
MCH RBC QN AUTO: 27.9 PG (ref 27–31.3)
MCHC RBC AUTO-ENTMCNC: 33.6 % (ref 33–37)
MCV RBC AUTO: 83.1 FL (ref 82–100)
PDW BLD-RTO: 15.3 % (ref 11.5–14.5)
PLATELET # BLD: 258 K/UL (ref 130–400)
RBC # BLD: 3.63 M/UL (ref 4.2–5.4)
WBC # BLD: 7.7 K/UL (ref 4.8–10.8)

## 2022-07-31 PROCEDURE — 6360000002 HC RX W HCPCS: Performed by: NURSE PRACTITIONER

## 2022-07-31 PROCEDURE — 85730 THROMBOPLASTIN TIME PARTIAL: CPT

## 2022-07-31 PROCEDURE — 85027 COMPLETE CBC AUTOMATED: CPT

## 2022-07-31 PROCEDURE — 2580000003 HC RX 258: Performed by: NURSE PRACTITIONER

## 2022-07-31 PROCEDURE — 99233 SBSQ HOSP IP/OBS HIGH 50: CPT | Performed by: INTERNAL MEDICINE

## 2022-07-31 PROCEDURE — 6370000000 HC RX 637 (ALT 250 FOR IP): Performed by: NURSE PRACTITIONER

## 2022-07-31 PROCEDURE — 2060000000 HC ICU INTERMEDIATE R&B

## 2022-07-31 PROCEDURE — 36415 COLL VENOUS BLD VENIPUNCTURE: CPT

## 2022-07-31 RX ADMIN — CARVEDILOL 6.25 MG: 6.25 TABLET, FILM COATED ORAL at 08:29

## 2022-07-31 RX ADMIN — PANTOPRAZOLE SODIUM 40 MG: 40 TABLET, DELAYED RELEASE ORAL at 06:21

## 2022-07-31 RX ADMIN — HEPARIN SODIUM 3100 UNITS: 1000 INJECTION INTRAVENOUS; SUBCUTANEOUS at 09:47

## 2022-07-31 RX ADMIN — SODIUM CHLORIDE, PRESERVATIVE FREE 10 ML: 5 INJECTION INTRAVENOUS at 08:29

## 2022-07-31 RX ADMIN — LACTOBACILLUS TAB 2 TABLET: TAB at 08:29

## 2022-07-31 RX ADMIN — HEPARIN SODIUM AND DEXTROSE 17.05 UNITS/KG/HR: 10000; 5 INJECTION INTRAVENOUS at 03:26

## 2022-07-31 RX ADMIN — CITALOPRAM HYDROBROMIDE 20 MG: 20 TABLET ORAL at 08:29

## 2022-07-31 RX ADMIN — MICONAZOLE NITRATE: 2 POWDER TOPICAL at 08:29

## 2022-07-31 RX ADMIN — FERROUS SULFATE TAB 325 MG (65 MG ELEMENTAL FE) 325 MG: 325 (65 FE) TAB at 08:29

## 2022-07-31 RX ADMIN — LEVOTHYROXINE SODIUM 88 MCG: 88 TABLET ORAL at 08:29

## 2022-07-31 RX ADMIN — CARVEDILOL 6.25 MG: 6.25 TABLET, FILM COATED ORAL at 15:58

## 2022-07-31 RX ADMIN — LACTOBACILLUS TAB 2 TABLET: TAB at 22:21

## 2022-07-31 RX ADMIN — CEFTRIAXONE SODIUM 1000 MG: 1 INJECTION, POWDER, FOR SOLUTION INTRAMUSCULAR; INTRAVENOUS at 14:12

## 2022-07-31 RX ADMIN — SODIUM CHLORIDE, PRESERVATIVE FREE 10 ML: 5 INJECTION INTRAVENOUS at 22:21

## 2022-07-31 RX ADMIN — LACTOBACILLUS TAB 2 TABLET: TAB at 14:07

## 2022-07-31 RX ADMIN — MICONAZOLE NITRATE: 2 POWDER TOPICAL at 22:21

## 2022-07-31 ASSESSMENT — PAIN SCALES - GENERAL
PAINLEVEL_OUTOF10: 0

## 2022-07-31 ASSESSMENT — ENCOUNTER SYMPTOMS
NAUSEA: 0
STRIDOR: 0
RESPIRATORY NEGATIVE: 1
EYES NEGATIVE: 1
BLOOD IN STOOL: 0
COUGH: 0
CHEST TIGHTNESS: 0
WHEEZING: 0
GASTROINTESTINAL NEGATIVE: 1
SHORTNESS OF BREATH: 0

## 2022-07-31 NOTE — PROGRESS NOTES
Hospitalist Daily Progress Note  Name: Kavitha Choe  Age: 80 y.o. Gender: female  CodeStatus: Full Code  Allergies: Latex  Tape Geovanna [address]    Chief Complaint:No chief complaint on file. Primary Care Provider: Percy Rendon MD    InpatientTreatment Team: Treatment Team: Attending Provider: Severo Seltzer, MD; Consulting Physician: Lo Hubbard MD; Consulting Physician: Blue Shane MD; Consulting Physician: Devika Swann MD; Consulting Physician: Marianna Lewis MD; Registered Nurse: Agusto Eddy, ADDIE; Utilization Reviewer: Luke Joyner, RN; Registered Nurse: Mario Elizondo, RN; LPN: Lamont Vyas LPN; Utilization Reviewer: Teresa Malik RN    Admission Date: 7/29/2022      Subjective: No chest pain, sob, nausea. Resting in bed. Physical Exam  Vitals and nursing note reviewed. Constitutional:       Appearance: Normal appearance. Cardiovascular:      Rate and Rhythm: Normal rate and regular rhythm. Pulmonary:      Effort: Pulmonary effort is normal.      Breath sounds: Normal breath sounds. Abdominal:      General: Bowel sounds are normal.      Palpations: Abdomen is soft. Musculoskeletal:         General: Normal range of motion. Skin:     General: Skin is warm and dry. Neurological:      Mental Status: She is alert and oriented to person, place, and time. Mental status is at baseline.        Medications:  Reviewed    Infusion Medications:    sodium chloride      heparin (porcine) 19.05 Units/kg/hr (07/31/22 0948)     Scheduled Medications:    carvedilol  6.25 mg Oral BID WC    cefTRIAXone (ROCEPHIN) IV  1,000 mg IntraVENous Q24H    citalopram  20 mg Oral Daily    ferrous sulfate  325 mg Oral Every Other Day    lactobacillus acidophilus  2 tablet Oral TID    levothyroxine  88 mcg Oral Daily    miconazole   Topical BID    pantoprazole  40 mg Oral QAM AC    sodium chloride flush  5-40 mL IntraVENous 2 times per day     PRN Meds: sodium chloride, acetaminophen, HYDROcodone 5 mg - acetaminophen, loperamide, ondansetron **OR** ondansetron, polyethylene glycol, potassium chloride, sodium chloride flush, heparin (porcine), heparin (porcine)    Labs:   Recent Labs     07/29/22  1200 07/31/22  0516   WBC 8.8 7.7   HGB 10.2* 10.1*   HCT 31.6* 30.2*    258       No results for input(s): NA, K, CL, CO2, BUN, CREATININE, CALCIUM, PHOS in the last 72 hours. Invalid input(s): MAGNES  No results for input(s): AST, ALT, BILIDIR, BILITOT, ALKPHOS in the last 72 hours. Recent Labs     07/29/22  1200   INR 1.5       No results for input(s): Elizabeth Highman in the last 72 hours. Urinalysis:   Lab Results   Component Value Date/Time    NITRU Negative 07/18/2022 12:30 PM    WBCUA 20-50 07/18/2022 12:30 PM    BACTERIA Negative 07/18/2022 12:30 PM    RBCUA 6-10 07/18/2022 12:30 PM    BLOODU SMALL 07/18/2022 12:30 PM    SPECGRAV 1.044 07/18/2022 12:30 PM    GLUCOSEU Negative 07/18/2022 12:30 PM       Radiology:   Most recent    Chest CT      WITH CONTRAST:Results for orders placed during the hospital encounter of 06/22/22    CT CHEST W CONTRAST    Narrative  CT of the Chest with intravenous contrast medium. HISTORY: Ascending aortic aneurysm. TECHNICAL FACTORS:    CT imaging of the chest was obtained and formatted as 5 mm contiguous axial images from the thoracic inlet through the adrenal glands. Sagittal and coronal reconstructions obtained during postprocessing. Intravenous contrast medium:  Isovue-300, 50 mL    Comparison:  CT chest, February 17, 2021, CT chest, January 25, 2021, March 15, 2019. FINDINGS:      Right lung: No nodules, masses, consolidation, pleural effusion, pneumothorax. Mild peripheral groundglass opacity, right middle, and right lower lobes. Left lung: No nodules, masses, consolidation, pleural effusion, pneumothorax. Subsegmental consolidation left lower lobe. Mild dependent groundglass opacity left upper lobe.     Lymph nodes: No hilar, mediastinal, or axillary lymph node enlargement. Thoracic aorta: AP and transverse dimension, ascending thoracic aorta, at level of pulmonary arterial bifurcation, measures 4.6 x 4.9 cm. This compares with prior measurement of 4.5 x 4.5 cm, in February, 2021. Cardiac Size: Enlarged. Pericardial effusion: None. Upper abdomen:Limited imaging upper abdomen shows no gross anomaly. Musculoskeletal:No osteoblastic, and no osteolytic lesions. Impression  Ascending thoracic aortic aneurysm measuring 4.6 x 4.9 cm cyst discussed. Cardiomegaly. Groundglass opacities as described may reflect edema, or other inflammatory or infectious etiologies. All CT scans at this facility use dose modulation, iterative reconstruction, and/or weight based dosing when appropriate to reduce radiation dose to as low as reasonably achievable. WITHOUT CONTRAST: No results found for this or any previous visit. CXR      2-view: Results for orders placed during the hospital encounter of 01/25/21    XR CHEST (2 VW)    Impression  1. No evidence of pneumothorax. Resolution of left pleural effusion. Cardiac silhouette remains enlarged. Left lower lobe hazy opacity may represent atelectasis versus pneumonia or infiltrate. COMPARISON: No prior studies available for comparison. DIAGNOSIS:  Post left thoracentesis. Dr. Moon Koehler to read. COMMENTS: I50.43 Acute on chronic combined systolic and diastolic CHF (congestive heart failure) (HCC) ICD10    TECHNIQUE: XR CHEST (2 VW)    FINDINGS:  Left lower lobe opacity may represent atelectasis versus pneumonia or infiltrate. Interval resolution of left pleural effusion. No evidence of pneumothorax. Cardiac silhouette is enlarged. Visualized soft tissues, and osseous structures are  unremarkable. Portable: Results for orders placed during the hospital encounter of 02/09/21    XR CHEST PORTABLE    Impression  1. No evidence of pneumothorax.  Near complete resolution of the left pleural effusion. Persistent left lower lobe opacification, may represent pneumonia, though underlying nodule or mass cannot be excluded. There is vascular congestion consistent with  mild pulmonary edema. COMPARISON: February 13, 2021    DIAGNOSIS:  post left thoracentesis- show Dr Jett Hammond: I50.43 Acute on chronic combined systolic (congestive) and diastolic (congestive) heart failure ([address] ICD10    TECHNIQUE: XR CHEST PORTABLE    FINDINGS:  Left lower lobe opacity may represent pneumonia though underlying nodule or mass cannot be excluded. No evidence of pneumothorax. Near complete resolution of the left pleural effusion. Increase in vascular congestion, consistent with pulmonary edema. Cardiac silhouette remains enlarged. Visualized soft tissues, and osseous structures are unremarkable. Echo No results found for this or any previous visit. Assessment/Plan:    Active Hospital Problems    Diagnosis Date Noted    Abscess [L02.91] 07/29/2022     Priority: Medium     Enlarging AAA with new large mural thrombus: Failed xarelto treatment. start heparin drip. Cardiology to follow closely. 7/31 - remain on heparin gtt    R shayy abdominal abscess with possible mass: CT scan abdomen showed pericolic abscess, fluid aspiration and drain placed on 7/19 with improvement of pain. ID on consult, continue rocephin. PRN pain medications. Hem/onc consult. Drain stopped draining yesterday. CT repeated today. Continues to have large cystic mass. IR to follow. Transfer to tertiary center once accepted given complexity of complex fluid collection and high surgical risk along with anticoagulation. Cytology was negative for malignancy. 7/31 - remain on iv abx     CHF, PAF HTN: Cardiology consult. Not in exacerbation. Continue current regimen.  Stop xarelto and start heparin drip     Anemia: follow H/H     Hypothyroidism: Continue home levothyroxine dose     Language barrier: daughter present. Utilize language line when needed however patient does refuse     Chronic respiratory failure: on 3lNC. Same as home. Continue.      Dispo - 7/30 - d/w [facility] transfer center, accepted to transfer [facility]  7/31 - awaiting bed at [facility]      Electronically signed by Ariel Claros MD on 7/31/2022 at 10:53 AM

## 2022-07-31 NOTE — FLOWSHEET NOTE
Assessment and VS completed. Pt Aox4. Patient has no complaints at this time. RUQ drain has a scant amount of serosanguinous drainage in tubing.  Patient on 3L O2 via NC.

## 2022-07-31 NOTE — PROGRESS NOTES
Progress Note  Patient: Roxana Anthony  Unit/Bed: L143/X394-80  YOB: 1932  MRN: [id number]  Acct: [de-identified]   Admitting Diagnosis: Abscess [L02.91]  Admit Date:  7/29/2022  Hospital Day: 2    Chief Complaint: PAF    Histories:  Past Medical History:   Diagnosis Date    Ascending aortic aneurysm ([facility] [place] [address] 6/23/2017    Charcot Arleen Tooth muscular atrophy     CHF (congestive heart failure) (HCC)     Chronic diastolic CHF (congestive heart failure) ([facility] [place] [address] 4/1/2022    Depression     Descending aortic aneurysm ([facility] [place] [address] 6/23/2017    Essential hypertension 2/16/2018    Headache     HTN (hypertension)     Impaired mobility and activities of daily living     Lumbar stenosis with neurogenic claudication     Myelopathy ([facility] [place] [address]     Osteoarthritis      Past Surgical History:   Procedure Laterality Date    JOINT REPLACEMENT Bilateral     knees    OTHER SURGICAL HISTORY Right 07/21/2022    cat scan guided abdominal mass aspiration with drain placement by Dr. Araceli Cary Left 02/25/2021    LEFT PLEURAL CATHETER INSERTION performed by Jessi Aburto MD at [place] [address] Left 01/29/2021    total of 755 cc removed per Dr Lance Braun specimen sent to lab     Family History   Problem Relation Age of Onset    Arthritis Mother     Arthritis Father     High Blood Pressure Father      Social History     Socioeconomic History    Marital status:       Spouse name: None    Number of children: 2    Years of education: None    Highest education level: None   Tobacco Use    Smoking status: Never    Smokeless tobacco: Never   Substance and Sexual Activity    Alcohol use: No     Alcohol/week: 0.0 standard drinks    Drug use: No   Social History Narrative    , Lives With: Alone, son lives down the street, dtr is in the area    Type of Home: [facility]  in [address]: One level    Home Access: Stairs to enter with rails- Number of Steps: 2- Rails: Both    Bathroom Shower/Tub: Tub/Shower unit, Bathroom Equipment: Grab bars in shower, Shower chair    Home Equipment: Rolling walker, Cane(Pt infrequemtly uses DME for ambulation and prefers to furniture walk in home)    ADL Assistance: [facility]: [place] [address] Responsibilities: Yes    Ambulation Assistance: Independent, Transfer Assistance: Independent    Additional Comments: Son stops over 2 times daily           Subjective/HPI    7/31/2022  Patient laying in bed looks comfortable  Daughter at bedside  No new recommendations from cardiology standpoint      7/30/22  Patient laying in bed looks comfortable  Daughter at bedside updated on patient's condition  Daughter very inquisitive asking for details about her mom's condition  Spoke to daughter and updated on patient's current patient's condition  Patient not on telemetry she refuses to wear the monitor    7/29/22  sitting in chair. Little abd discomfort. Daughter in room to translate. Abscess drain clotted. New CT reviewed with Dr. Nehemias Sim. EKG:         Review of Systems:   Review of Systems   Constitutional: Negative. Negative for diaphoresis and fatigue. HENT: Negative. Eyes: Negative. Respiratory: Negative. Negative for cough, chest tightness, shortness of breath, wheezing and stridor. Cardiovascular: Negative. Negative for chest pain, palpitations and leg swelling. Gastrointestinal: Negative. Negative for blood in stool and nausea. Genitourinary: Negative. Musculoskeletal: Negative. Skin: Negative. Neurological:  Positive for dizziness. Negative for syncope, weakness and light-headedness. Hematological: Negative. Psychiatric/Behavioral: Negative. Physical Examination:    /84   Pulse 76   Temp 98 °F (36.7 °C) (Oral)   Resp 18   Ht 5' (1.524 m)   Wt 167 lb 14.4 oz (76.2 kg)   SpO2 98%   BMI 32.79 kg/m²    Physical Exam   Constitutional: She appears healthy. No distress.    HENT:   Normal 07/25/2022 05:33 AM    CO2 30 07/25/2022 05:33 AM    BUN 13 07/25/2022 05:33 AM    CREATININE 0.52 07/25/2022 05:33 AM    GFRAA >60.0 07/25/2022 05:33 AM    LABGLOM >60.0 07/25/2022 05:33 AM    GLUCOSE 114 07/25/2022 05:33 AM    PROT 6.0 07/24/2022 05:00 AM    LABALBU 2.7 07/24/2022 05:00 AM    CALCIUM 8.7 07/25/2022 05:33 AM    BILITOT 0.3 07/24/2022 05:00 AM    ALKPHOS 51 07/24/2022 05:00 AM    AST 20 07/24/2022 05:00 AM    ALT 19 07/24/2022 05:00 AM     BMP:    Lab Results   Component Value Date/Time     07/25/2022 05:33 AM    K 3.8 07/25/2022 05:33 AM    K 3.2 07/24/2022 05:00 AM     07/25/2022 05:33 AM    CO2 30 07/25/2022 05:33 AM    BUN 13 07/25/2022 05:33 AM    LABALBU 2.7 07/24/2022 05:00 AM    CREATININE 0.52 07/25/2022 05:33 AM    CALCIUM 8.7 07/25/2022 05:33 AM    GFRAA >60.0 07/25/2022 05:33 AM    LABGLOM >60.0 07/25/2022 05:33 AM    GLUCOSE 114 07/25/2022 05:33 AM     Magnesium:    Lab Results   Component Value Date/Time    MG 1.7 07/24/2022 05:00 AM     Troponin:    Lab Results   Component Value Date/Time    TROPONINI <0.010 07/18/2022 12:30 PM        Active Hospital Problems    Diagnosis Date Noted    Abscess [L02.91] 07/29/2022     Priority: Medium        Assessment/Plan:  Abd Abscess - s/p Drainage - clotted off. New drain would pose significant risk of Bowel perforation due to location of new loculated abscess. discomfort. May need to consider re imaging. LVEF 60  Frequent PAF - In SR today. On low dose Xarelto   CAD- moderate - no angina. 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 We will need to stop Xarelto and start Heparin. Discussed with IM and Dr. Ángel Covington. Pt will best benefit form tertiary care transfer. Had long discussion with pt and daughter.   Pt is not apposed to heart nor abd surgery despite informing that she will be at high rsik of perioperative complication to include death.    No new recommendations from cardiology standpoint         Electronically signed by Symone Mahoney MD on 7/31/2022 at 1:52 PM

## 2022-08-01 LAB
ALBUMIN SERPL-MCNC: 3 G/DL (ref 3.5–4.6)
ANION GAP SERPL CALCULATED.3IONS-SCNC: 11 MEQ/L (ref 9–15)
APTT: 114.9 SEC (ref 24.4–36.8)
APTT: 54.6 SEC (ref 24.4–36.8)
APTT: 62.1 SEC (ref 24.4–36.8)
APTT: 87.2 SEC (ref 24.4–36.8)
BUN BLDV-MCNC: 11 MG/DL (ref 8–23)
C-REACTIVE PROTEIN, HIGH SENSITIVITY: 45.6 MG/L (ref 0–5)
CALCIUM SERPL-MCNC: 9.3 MG/DL (ref 8.5–9.9)
CHLORIDE BLD-SCNC: 102 MEQ/L (ref 95–107)
CO2: 27 MEQ/L (ref 20–31)
CREAT SERPL-MCNC: 0.6 MG/DL (ref 0.5–0.9)
EKG ATRIAL RATE: 98 BPM
EKG P-R INTERVAL: 192 MS
EKG Q-T INTERVAL: 350 MS
EKG QRS DURATION: 148 MS
EKG QTC CALCULATION (BAZETT): 446 MS
EKG R AXIS: -52 DEGREES
EKG T AXIS: -42 DEGREES
EKG VENTRICULAR RATE: 98 BPM
GFR AFRICAN AMERICAN: >60
GFR NON-AFRICAN AMERICAN: >60
GLUCOSE BLD-MCNC: 130 MG/DL (ref 70–99)
HCT VFR BLD CALC: 31.8 % (ref 37–47)
HEMOGLOBIN: 10.1 G/DL (ref 12–16)
MAGNESIUM: 1.9 MG/DL (ref 1.7–2.4)
MCH RBC QN AUTO: 27 PG (ref 27–31.3)
MCHC RBC AUTO-ENTMCNC: 31.9 % (ref 33–37)
MCV RBC AUTO: 84.5 FL (ref 82–100)
PDW BLD-RTO: 15.4 % (ref 11.5–14.5)
PHOSPHORUS: 3.1 MG/DL (ref 2.3–4.8)
PLATELET # BLD: 280 K/UL (ref 130–400)
POTASSIUM SERPL-SCNC: 3.6 MEQ/L (ref 3.4–4.9)
PROCALCITONIN: 0.05 NG/ML (ref 0–0.15)
RBC # BLD: 3.76 M/UL (ref 4.2–5.4)
SODIUM BLD-SCNC: 140 MEQ/L (ref 135–144)
WBC # BLD: 7.5 K/UL (ref 4.8–10.8)

## 2022-08-01 PROCEDURE — 6360000002 HC RX W HCPCS: Performed by: NURSE PRACTITIONER

## 2022-08-01 PROCEDURE — 2060000000 HC ICU INTERMEDIATE R&B

## 2022-08-01 PROCEDURE — 80069 RENAL FUNCTION PANEL: CPT

## 2022-08-01 PROCEDURE — 85730 THROMBOPLASTIN TIME PARTIAL: CPT

## 2022-08-01 PROCEDURE — 2580000003 HC RX 258: Performed by: NURSE PRACTITIONER

## 2022-08-01 PROCEDURE — 85027 COMPLETE CBC AUTOMATED: CPT

## 2022-08-01 PROCEDURE — 84145 PROCALCITONIN (PCT): CPT

## 2022-08-01 PROCEDURE — 99232 SBSQ HOSP IP/OBS MODERATE 35: CPT | Performed by: INTERNAL MEDICINE

## 2022-08-01 PROCEDURE — 2700000000 HC OXYGEN THERAPY PER DAY

## 2022-08-01 PROCEDURE — 86141 C-REACTIVE PROTEIN HS: CPT

## 2022-08-01 PROCEDURE — 6370000000 HC RX 637 (ALT 250 FOR IP): Performed by: NURSE PRACTITIONER

## 2022-08-01 PROCEDURE — 83735 ASSAY OF MAGNESIUM: CPT

## 2022-08-01 PROCEDURE — 99221 1ST HOSP IP/OBS SF/LOW 40: CPT | Performed by: PHYSICAL MEDICINE & REHABILITATION

## 2022-08-01 PROCEDURE — 36415 COLL VENOUS BLD VENIPUNCTURE: CPT

## 2022-08-01 RX ADMIN — LACTOBACILLUS TAB 2 TABLET: TAB at 16:24

## 2022-08-01 RX ADMIN — MICONAZOLE NITRATE: 2 POWDER TOPICAL at 08:00

## 2022-08-01 RX ADMIN — SODIUM CHLORIDE, PRESERVATIVE FREE 10 ML: 5 INJECTION INTRAVENOUS at 19:42

## 2022-08-01 RX ADMIN — SODIUM CHLORIDE, PRESERVATIVE FREE 10 ML: 5 INJECTION INTRAVENOUS at 07:59

## 2022-08-01 RX ADMIN — FERROUS SULFATE TAB 325 MG (65 MG ELEMENTAL FE) 325 MG: 325 (65 FE) TAB at 07:55

## 2022-08-01 RX ADMIN — PANTOPRAZOLE SODIUM 40 MG: 40 TABLET, DELAYED RELEASE ORAL at 05:37

## 2022-08-01 RX ADMIN — CARVEDILOL 6.25 MG: 6.25 TABLET, FILM COATED ORAL at 16:30

## 2022-08-01 RX ADMIN — CITALOPRAM HYDROBROMIDE 20 MG: 20 TABLET ORAL at 07:55

## 2022-08-01 RX ADMIN — HEPARIN SODIUM AND DEXTROSE 14 UNITS/KG/HR: 10000; 5 INJECTION INTRAVENOUS at 07:43

## 2022-08-01 RX ADMIN — MICONAZOLE NITRATE: 2 POWDER TOPICAL at 19:42

## 2022-08-01 RX ADMIN — HEPARIN SODIUM 3100 UNITS: 1000 INJECTION INTRAVENOUS; SUBCUTANEOUS at 07:40

## 2022-08-01 RX ADMIN — LEVOTHYROXINE SODIUM 88 MCG: 88 TABLET ORAL at 07:56

## 2022-08-01 RX ADMIN — CARVEDILOL 6.25 MG: 6.25 TABLET, FILM COATED ORAL at 07:56

## 2022-08-01 RX ADMIN — LACTOBACILLUS TAB 2 TABLET: TAB at 07:56

## 2022-08-01 RX ADMIN — LACTOBACILLUS TAB 2 TABLET: TAB at 19:40

## 2022-08-01 RX ADMIN — HEPARIN SODIUM 3100 UNITS: 1000 INJECTION INTRAVENOUS; SUBCUTANEOUS at 16:20

## 2022-08-01 RX ADMIN — CEFTRIAXONE SODIUM 1000 MG: 1 INJECTION, POWDER, FOR SOLUTION INTRAMUSCULAR; INTRAVENOUS at 16:12

## 2022-08-01 ASSESSMENT — ENCOUNTER SYMPTOMS
ABDOMINAL PAIN: 1
CHEST TIGHTNESS: 0
PHOTOPHOBIA: 0
BACK PAIN: 0
WHEEZING: 0
SHORTNESS OF BREATH: 0
GASTROINTESTINAL NEGATIVE: 1
COUGH: 0
CONSTIPATION: 0
STRIDOR: 0
DIARRHEA: 0
EYES NEGATIVE: 1
VOMITING: 0
NAUSEA: 0
SORE THROAT: 0
BLOOD IN STOOL: 0
RESPIRATORY NEGATIVE: 1

## 2022-08-01 ASSESSMENT — PAIN SCALES - GENERAL
PAINLEVEL_OUTOF10: 0

## 2022-08-01 NOTE — ONCOLOGY
Hematology/Oncology Consult  Encounter Date: 2022 1:34 PM    Ms. Collin Guzman is a 80 y.o. female  : 1932  MRN: [id number]  Acct Number: [de-identified]  Requesting Provider: Adrien Mahoney MD    Reason for request: Abdominal mass and aortic thrombus      CONSULTANT: Jorge Seymour DO    HPI:The patient is re-admitted for a new descending aorta thrombus. Recently with a cystic mass and abdominal drain placed. Repeat CT showed a new thrombus in descending aorta while off Xarelto just a few days. She was admitted and started IV Heparin.     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    Headache    Depression    Thoracic aortic aneurysm without rupture (HCC)    Descending aortic aneurysm (HCC)    Dizziness    Shortness of breath    Essential hypertension    Acute on chronic combined systolic and diastolic CHF (congestive heart failure) (HCC)    Gait abnormality    A-fib (HCC)    Pleural effusion    Hypoxia    Acute pulmonary edema (HCC)    Acute on chronic combined systolic (congestive) and diastolic (congestive) heart failure (HCC)    Impaired mobility    Acute cystitis with hematuria    Chronic diastolic CHF (congestive heart failure) (HCC)    Right lower quadrant abdominal pain    Hyponatremia    Hypokalemia    Anemia    CHF (congestive heart failure) (HCC)    Hypothyroid    Abdominal mass    Central retinal vein occlusion, left eye, stable    Hx of drainage of abscess    Generalized abdominal pain    Streptococcus viridans infection    Aortic thrombus (HCC)    Abscess     Past Medical History:   Diagnosis Date    Ascending aortic aneurysm ([address] 2017    Charcot Arleen Tooth muscular atrophy CHF (congestive heart failure) (HCC)     Chronic diastolic CHF (congestive heart failure) ([facility] 4/1/2022    Depression     Descending aortic aneurysm ([facility] 6/23/2017    Essential hypertension 2/16/2018    Headache     HTN (hypertension)     Impaired mobility and activities of daily living     Lumbar stenosis with neurogenic claudication     Myelopathy ([facility]     Osteoarthritis      @PSH@  Family History   Problem Relation Age of Onset    Arthritis Mother     Arthritis Father     High Blood Pressure Father      Social History     Socioeconomic History    Marital status:       Spouse name: Not on file    Number of children: 2    Years of education: Not on file    Highest education level: Not on file   Occupational History    Not on file   Tobacco Use    Smoking status: Never    Smokeless tobacco: Never   Substance and Sexual Activity    Alcohol use: No     Alcohol/week: 0.0 standard drinks    Drug use: No    Sexual activity: Not on file   Other Topics Concern    Not on file   Social History Narrative    , Lives With: Alone, son lives down the street, dtr is in the area    Type of Home: [address]: One level    Home Access: Stairs to enter with rails- Number of Steps: 2- Rails: Both    Bathroom Shower/Tub: Tub/Shower unit, Bathroom Equipment: Grab bars in shower, Shower chair    Home Equipment: Rolling walker, Cane(Pt infrequemtly uses DME for ambulation and prefers to furniture walk in home)    ADL Assistance: Independent, [address]: Independent    Homemaking Responsibilities: Yes    Ambulation Assistance: Independent, Transfer Assistance: Independent    Additional Comments: Son stops over 2 times daily         Social Determinants of Health     Financial Resource Strain: Not on file   Food Insecurity: Not on file   Transportation Needs: Not on file   Physical Activity: Not on file   Stress: Not on file   Social Connections: Not on file   Intimate Partner Violence: Not on file   Housing Stability: Not on file     Current Facility-Administered Medications   Medication Dose Route Frequency Provider Last Rate Last Admin    0.9 % sodium chloride infusion   IntraVENous PRN ADRIENNE Richey - EARLENE        acetaminophen (TYLENOL) tablet 650 mg  650 mg Oral Q6H PRN Emmanuel Johsnton APRN - NP        carvedilol (COREG) tablet 6.25 mg  6.25 mg Oral BID WC ADRIENNE Richey - NP   6.25 mg at 08/01/22 0756    cefTRIAXone (ROCEPHIN) 1,000 mg in dextrose 5 % 50 mL IVPB mini-bag  1,000 mg IntraVENous Q24H ADRIENNE Richey - EARLENE   Stopped at 07/31/22 1442    citalopram (CELEXA) tablet 20 mg  20 mg Oral Daily Emmanuel Johnston APRN - NP   20 mg at 08/01/22 0755    ferrous sulfate (IRON 325) tablet 325 mg  325 mg Oral Every Other Day ADRIENNE Richey - EARLENE   325 mg at 08/01/22 0755    HYDROcodone-acetaminophen ([facility]) 5-325 MG per tablet 1 tablet  1 tablet Oral Q4H PRN Emmanuel Johnston APRN - EARLENE        lactobacillus acidophilus [facility]) 2 tablet  2 tablet Oral TID ADRIENNE Richey - NP   2 tablet at 08/01/22 0756    levothyroxine (SYNTHROID) tablet 88 mcg  88 mcg Oral Daily ADRIENNE Richey - NP   88 mcg at 08/01/22 0756    loperamide (IMODIUM) capsule 2 mg  2 mg Oral 4x Daily PRN Emmanuel Johnston APRN - NP        miconazole (MICOTIN) 2 % powder   Topical BID ADRIENNE Richey - NP   Given at 08/01/22 0800    ondansetron (ZOFRAN-ODT) disintegrating tablet 4 mg  4 mg Oral Q8H PRN ADRIENNE Richey - EARLENE        Or    ondansetron (ZOFRAN) injection 4 mg  4 mg IntraVENous Q6H PRN Emmanuel Johnston APRN - NP        pantoprazole (PROTONIX) tablet 40 mg  40 mg Oral QAM AC Emmanuel Johnston APRN - NP   40 mg at 08/01/22 0537    polyethylene glycol (GLYCOLAX) packet 17 g  17 g Oral Daily PRN Emmanuel Johnston APRN - NP        potassium chloride 10 mEq/100 mL IVPB (Peripheral Line)  10 mEq IntraVENous PRN Emmanuel Johnston APRN - NP        sodium chloride flush 0.9 % injection 5-40 mL  5-40 mL IntraVENous 2 times per day Glennda Callow, APRN - NP   10 mL at 08/01/22 0759    sodium chloride flush 0.9 % injection 5-40 mL  5-40 mL IntraVENous PRN Glennda Callow, APRN - NP        heparin (porcine) injection 3,100 Units  40 Units/kg IntraVENous PRN Glennda Callow, APRN - NP   3,100 Units at 08/01/22 0740    heparin (porcine) injection 6,190 Units  80 Units/kg IntraVENous PRN Glennda Callow, APRN - NP        heparin 25,000 units in dextrose 5% 250 mL (premix) infusion  5-30 Units/kg/hr IntraVENous Continuous Glennda Callow, APRN - NP 10.8 mL/hr at 08/01/22 0743 14 Units/kg/hr at 08/01/22 0743     [unfilled]  Allergies   Allergen Reactions    Latex     Tape Irina Warren Tape]         Review of Systems:  Diarrhea resolved and eats well. No bleeding. No nausea or emesis. No SOB. No pain. No fever, chills, or night sweats. Main issue is weakness. Otherwise ROS are negative. PHYSICAL EXAMINATION:   VITAL SIGNS: BP (!) 139/94   Pulse 66   Temp 98.2 °F (36.8 °C) (Oral)   Resp 18   Ht 5' (1.524 m)   Wt 164 lb (74.4 kg)   SpO2 92%   BMI 32.03 kg/m²     GENERAL: In no acute distress, well- nourished, well- developed, alert and oriented to person place and time. SKIN: Warm and dry, without jaundice, ecchymoses, or petechiae. HEENT: Normocephalic, sclera anicteric, oral mucosa moist.  NODES: No palpable adenopathy in the neck Levels I-V, bilateral   Supraclavicular fossae, axillary chains, or inguinal regions. LUNGS: Good inspiratory effort, no accessory muscle use, clear bilaterally, no focal wheeze, rales or rhonchi. CARDIAC: Regular rate and rhythm, without murmurs, rubs or gallops. ABDOMINAL: Normal bowel soundspresent, soft, non-tender, no mass or  organomegaly. Minimal bloody drainage from abdominal drain. MUSKL: Full ROM. EXTREMITIES:Trace edema. NEUROLOGIC: No grossly apparent focal deficits.     LAB RESULTS:  Recent Results (from the past 24 hour(s))   APTT Collection Time: 07/31/22  3:45 PM   Result Value Ref Range    aPTT 97.9 (H) 24.4 - 36.8 sec   APTT    Collection Time: 07/31/22 11:35 PM   Result Value Ref Range    aPTT 114.9 (H) 24.4 - 36.8 sec   APTT    Collection Time: 08/01/22  6:27 AM   Result Value Ref Range    aPTT 54.6 (H) 24.4 - 36.8 sec     No results for input(s): COLORU, PHUR, LABCAST, WBCUA, RBCUA, MUCUS, TRICHOMONAS, YEAST, BACTERIA, CLARITYU, SPECGRAV, LEUKOCYTESUR, UROBILINOGEN, BILIRUBINUR, BLOODU, GLUCOSE in the last 72 hours. Invalid input(s): NITRATE, UKETONEFRAÍN, UAMORPHOUS     Pathology:     RADIOLOGY RESULTS:  CT ABDOMEN PELVIS W WO CONTRAST Additional Contrast? None    Result Date: 7/29/2022  1. The previously placed abscess drain in the right lower quadrant appears to be in the periphery of the abscess with loculations now more apparent in the fluid collection. The fluid collection appears to be grossly the same size as prior to drainage. 2.New mural thrombus is noted in the distal aortic arch extending to the lower thoracic aorta which was not seen on prior study. Findings discussed with Dr. Alejandro Perez 3. Note is again made of an aortic aneurysm involving the ascending aorta, aortic arch, and descending aorta. Based on current images, the ascending aorta appears to be 2 mm larger when compared to study dating February 17, 2021. The ascending aorta now measures 5.5 cm. Previously the arch measured 5.3 cm. 4.Note is again made of left lower small pleural effusion with bilateral basilar opacities which may represent atelectasis. 5.Note is again made of a large cystic mass in the pelvis.  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 CT Dose-Length Product (estimate related to radiation exposure from this exam):  1386.22  mGy*cm. CT ABDOMEN: Bibasilar atelectasis with left small pleural effusion. Underlying nodules cannot be excluded. The heart is enlarged. The liver is of normal size and attenuation without focal lesions. The spleen is of normal size and attenuation without focal lesions. Pancreas shows no signs of focal mass or peripancreatic fluid collection. Kidneys are normal in size. There is no hydronephrosis. No renal mass is identified. Adrenal glands are unremarkable. Note is again made of a thoracoabdominal aortic aneurysm. The ascending aorta now measures 5.5 cm, this is larger when compared to prior study dating 5.3 cm. 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 This large thrombus was not seen on prior study. No significant retroperitoneal adenopathy or ascites is identified. Again seen is a cystic lesion involving the proximal ascending aorta which now appears to be more septated than prior. The previously placed drain is now in the periphery of the lesion. CT PELVIS:  A large cystic mass is again seen in the lower pelvis. CT ABDOMEN PELVIS WO CONTRAST Additional Contrast? Oral    Result Date: 7/18/2022  CT of the Abdomen and Pelvis without intravenous contrast medium History:  Approximate 1 week history of abdominal pain Technical Factors: CT imaging of the abdomen and pelvis were obtained and formatted as 5 mm contiguous axial images from the domes of the diaphragm to the symphysis pubis. Sagittal and coronal reconstructions were also obtained. Oral contrast medium: Barium sulfate, 450 mL. Intravenous contrast medium:  None. Comparison:  CT abdomen pelvis, July 18, 2022, 1444 hours, February 8, 2021. Findings: Residual intravenous contrast medium from recent CT examination identified. Lungs:  Lung bases clear. Cardiac size enlarged, unchanged. Calcification at level of mitral valve. Small hiatal hernia. Liver:  Normal in size, shape, and attenuation. Bile Ducts:  Normal in caliber. Gallbladder:  No stones or wall thickening. Pancreas:  Normal without masses, cysts, ductal dilatation or calcification. Spleen:  Normal in size without masses or calcifications. No splenules. Kidneys:  Normal in size. No hydronephrosis, masses, or stones. Adrenals:  Normal. Small bowel:  Normal in caliber. See \"peritoneum \". Appendix:  Not visualized. Colon:  Normal in caliber. See \"peritoneum \". Peritoneum:  No free air. A bilobed, 10.0 x 8.0 x 10.6 cm mass having central rounded septated areas of low attenuation, 3.7 x 3.5 x 5.4 cm, and displacing cecum and ascending colon anteriorly (series 2, image 50, series 601, image 54, series 602, image 36). No calcification identified. Trace adjacent fat stranding demonstrated. Vessels: Aorta normal in course and caliber. Lymph nodes:  Retroperitoneal:  No enlarged retroperitoneal lymph nodes. Mesenteric:  No enlarged mesenteric lymph nodes. Pelvic: No enlarged pelvic lymph nodes. Ureters: Normal in course and caliber. No calcifications. Bladder: No wall thickening. Reproductive organs: 8.0 x 5.8 x 7.5 cm left adnexal cyst displacing urinary bladder right and laterally, anteriorly, and inferiorly (series 2, image 68, series 601, image 42, series 602, image 52). Abdominal Wall: Fat identified bilateral inguinal canals. Bones:  No bone lesions. Multilevel disc space narrowing lumbar spine. No post operative changes. Complex 10.0 x 8.0 x 10.6 cm mass with septated central low attenuation fluid collections as discussed. Mass displaces cecum and ascending colon anteriorly. Differential includes malignancy including appendiceal mucocele/carcinoma, as well as retroperitoneal sarcoma with central necrotic change.  Infection/abscess secondary consideration, minimal presence of adjacent inflammatory change. 8.0 x 5.8 x 7.5 cm left adnexal cysts as discussed, most likely ovarian in etiology. If clinical concern warrants, pelvic sonography may be obtained for further evaluation. Other findings discussed. All CT scans at this facility use dose modulation, iterative reconstruction, and/or weight based dosing when appropriate to reduce radiation dose to as low as reasonably achievable. CT Head WO Contrast    Result Date: 7/18/2022  CT OF THE BRAIN WITHOUT CONTRAST HISTORY: Kaitlin Vines is a Female of 80 years age with history of headache and dizziness. COMPARISON: July 19, 2017 TECHNIQUE:  Spiral CT examination of the brain was performed without intravenous contrast.  Images were obtained from the base of the skull up to the convexities in axial slices, and then examined in the blood, brain parenchymal and bony windows. FINDINGS:  No acute changes are noted. There is no evidence for mass, mass effect or midline shift. No abnormal extra-axial fluid collections are appreciated. Age-appropriate cortical volume loss is seen. There are patchy areas of hypoattenuation in a sub-cortical, central white and periventricular distribution consistent with nonspecific ischemic small vessel disease. Calcifications are again seen in the basal ganglia. There are no acute parenchymal alterations, blood byproducts or abnormal extracerebral fluid. The calvarium is grossly intact. The visualized paranasal sinuses show mucoceles or polyps in the maxillary sinuses. Mild fluid is seen in the mastoid air cells that is not significantly changed and is consistent with chronic changes. Jorgito Perez 1.Age-appropriate cortical atrophy and periventricular microangiopathy. When compared to previous study there has been no significant interval change.  2. No acute hemorrhage or extra-axial fluid collection All CT scans at this facility use dose modulation, iterative reconstruction, and/or weight based dosing when appropriate to reduce radiation dose to as low as reasonably achievable. CT ABSCESS DRAINAGE W CATH PLACEMENT S&I    1. Successful drainage and drain placement of right colonic soft tissue mass with central fluid collection. 2.Fluid was aspirated and sent for microbiology and cytology analysis. Additionally a 10 Frisian drain was placed yielding a thick red to yellow fluid with internal yellow debris. HISTORY: Riana Godinez is a Female of 80 years age. DIAGNOSIS:   Right abdominal mass with possible fluid collection vs necrotic tissue COMPARISON: None available. CT Dose-Length Product (estimate related to radiation exposure from this exam):  541.6  mGy*cm. PROCEDURE: Following the discussion of the procedure, alternatives, risks versus benefits, informed consent was obtained from the patient. Specifically, risks of after-biopsy pain at the site, rare possibility of excessive hemorrhage, infection, injury to the adjacent organs were discussed and the patient verbalized understanding. Pre-procedure evaluation confirmed that the patient was an appropriate candidate for conscious sedation. Adequate sedation was maintained during the entire procedure. Vital signs, pulse oximetry, and response to verbal commands were monitored and recorded by the nurse throughout the procedure and the recovery period. Medical information was entered in the medical record including the medications and dosages used. The patient returned to baseline neurologic and physiologic status prior to leaving the department. No immediate sedation related complications were noted. Medication for conscious sedation was administered via IV route. 45 minutes of conscious sedation was provided. Following universal protocol, patient and site verification was performed with a \"timeout\" prior to the procedure. The patient was placed on the CT table in supine  position and the right lateral abdomen area was prepped and draped in usual sterile fashion.   Using the usual sterile conditions, lidocaine and CT guidance, the fluid collection within soft tissue density  was accessed using a Yueh needle. After confirmation of appropriate localization of the needle, aspiration yielded a thick red to yellow fluid which was sent for microbiology and cytology analysis. Following that an Amplatz wire was placed through the Yueh sheath into the abscess under CT guidance. The tract was serially dilated with 6, 8, and 10 [place] dilators respectively. Following that a 10 [place] drainage catheter was advanced over the wire into the abscess under CT guidance and the anchoring loop was formed. Catheter was sutured to the skin and and secured with Percufix device. The patient tolerated the procedure well. No immediate complications identified. The patient left the CT suite in supine position to the recovery room in stable condition. Total local anesthetic used: lidocaine, approximately 15 mL. Estimated blood loss:  None. CT GUIDED NEEDLE PLACEMENT    1. Successful drainage and drain placement of right colonic soft tissue mass with central fluid collection. 2.Fluid was aspirated and sent for microbiology and cytology analysis. Additionally a 10 [place] drain was placed yielding a thick red to yellow fluid with internal yellow debris. HISTORY: Malachi Gore is a Female of 80 years age. DIAGNOSIS:   Right abdominal mass with possible fluid collection vs necrotic tissue COMPARISON: None available. CT Dose-Length Product (estimate related to radiation exposure from this exam):  541.6  mGy*cm. PROCEDURE: Following the discussion of the procedure, alternatives, risks versus benefits, informed consent was obtained from the patient. Specifically, risks of after-biopsy pain at the site, rare possibility of excessive hemorrhage, infection, injury to the adjacent organs were discussed and the patient verbalized understanding.  Pre-procedure evaluation confirmed that the patient was an appropriate candidate for conscious sedation. Adequate sedation was maintained during the entire procedure. Vital signs, pulse oximetry, and response to verbal commands were monitored and recorded by the nurse throughout the procedure and the recovery period. Medical information was entered in the medical record including the medications and dosages used. The patient returned to baseline neurologic and physiologic status prior to leaving the department. No immediate sedation related complications were noted. Medication for conscious sedation was administered via IV route. 45 minutes of conscious sedation was provided. Following universal protocol, patient and site verification was performed with a \"timeout\" prior to the procedure. The patient was placed on the CT table in supine  position and the right lateral abdomen area was prepped and draped in usual sterile fashion. Using the usual sterile conditions, lidocaine and CT guidance, the fluid collection within soft tissue density  was accessed using a Yueh needle. After confirmation of appropriate localization of the needle, aspiration yielded a thick red to yellow fluid which was sent for microbiology and cytology analysis. Following that an Amplatz wire was placed through the Yueh sheath into the abscess under CT guidance. The tract was serially dilated with 6, 8, and 10 Belinda Brick dilators respectively. Following that a 10 Nepali drainage catheter was advanced over the wire into the abscess under CT guidance and the anchoring loop was formed. Catheter was sutured to the skin and and secured with Percufix device. The patient tolerated the procedure well. No immediate complications identified. The patient left the CT suite in supine position to the recovery room in stable condition. Total local anesthetic used: lidocaine, approximately 15 mL. Estimated blood loss:  None.      CT ABDOMEN PELVIS W IV CONTRAST Additional Contrast? None    Result Date: 7/18/2022  Comparison: February 8, 2021 History: Right lower quadrant abdominal pain  Technique: CT ABDOMEN PELVIS W IV CONTRAST Helically Acquired CT of the  abdomen and pelvis was performed during the intravenous infusion of 100 mL of Isovue-370. Axial delayed images were also performed. Reformatted sagittal and coronal images were also  obtained. Findings: The visualized bases of the lungs shows no consolidating pneumonia or pleural effusion. There is atelectasis seen in the bases. The thoracic aorta and visualized portion is tortuous and mildly dilated at 4 cm in greatest transverse diameter which is stable. . In the right lower quadrant of the abdomen there are multiple bowel loops seen that are peripheral to hypodense collections which could be from fluid or proteinaceous collections such as abscess. This the loops of bowel appear to be a ascending colon and  ileum. The largest walled off collections measure 4.5 x 3.8 x 3.3 cm and 4.4 x 3.4 x 4.2 cm. There is pericolic stranding seen. The left colon is unremarkable. A cystic structure is again seen in the pelvis that measures 6.7 x 8.6 x 9.8 cm. A small amount of free fluid is seen in the posterior pelvis. Bilateral inguinal hernias are seen. The hernia on the left contains fat. The hernia on the right contains a loop of small bowel. No bowel obstruction is seen. The liver is decreased in attenuation and is enlarged. The spleen, pancreas and gallbladder and adrenal glands are unremarkable. Bilaterally both kidneys show uptake of contrast with no evidence for hydronephrosis or renal calculi. Prominent atherosclerotic calcifications are seen. The bladder is partially decompressed. The bladder wall however also appears to be thickened. Degenerative changes are seen in the spine at multiple levels. Intervertebral disc space narrowing is seen at L1 to. Slight anterolisthesis of L4 on L5 is noted. There is vacuum anomaly seen at all levels in the lumbar spine. Impression: 1.  In the right lower quadrant of the abdomen, pericolic abscesses versus pericolic mass is seen. This is thought more likely abscess. 2. A cystic mass is again seen in the pelvis and is likely in the ovary. It is unchanged from previous examination The patient will be given oral contrast and rescanned to evaluate the right lower quadrant. All CT scans at this facility use dose modulation, iterative reconstruction, and/or weight based dosing       ASSESSMENT AND PLAN:  Initially with lower abdominal mass thought to be abscess possibly attached to colon wall. Cytology negative for cancer and culture showed some gram positive cocci and drain placed. Drain was to be removed, but a new descending aorta thrombus seen on CT and patient started IV Heparin. Currently her main issue is weakness. I discussed the case with her family. Based on her age, I don't feel she is a candidate for \"heroic\" surgery efforts as risk likely exceeds benefit. I recommend continue IV Heparin or can change to Lovenox 1mg/kg q 12 hours and transfer back to rehab. Probably ok to remove abdominal drain. Repeat CT in about a week and then change back to Xarelto if improved.     Electronically signed by Danielle Friedman DO on 8/1/2022 at 1:34 PM

## 2022-08-01 NOTE — PROGRESS NOTES
Primary  Cleaning Responsibility: Primary  Shopping Responsibility: No (Son performs)  Ambulation Assistance: Independent (furniture walking and cane prn)  Transfer Assistance: Independent  Active : No  Patient's  Info: family  Additional Comments: Pt leaving hospital to go to a different place for sugery    Current Functional Status:  ADL  Feeding: Setup  Grooming: Contact guard assistance  UE Bathing: Supervision  LE Bathing: Moderate assistance  UE Dressing: Setup  LE Dressing: Maximum assistance  Toileting: Maximum assistance  Toilet Transfers  Toilet - Technique: Stand step  Equipment Used: Standard bedside commode  Toilet Transfer: Stand by assistance     Shower Transfers  Shower Transfers: Not tested    Orientation Status:   WFL    Cognition Status:  Cognition  Overall Cognitive Status: Exceptions  Arousal/Alertness: Appropriate responses to stimuli  Following Commands:  Follows one step commands with repetition  Attention Span: Appears intact  Memory: Appears intact  Safety Judgement: Decreased awareness of need for assistance, Decreased awareness of need for safety  Problem Solving: Good awareness of errors made  Insights: Fully aware of deficits  Initiation: Requires cues for some  Sequencing: Requires cues for some  Cognition Comment: Comp: Supervision, Exp: IND, Social: IND, Prob: Min A, Mem: Supervision    Perception Status:  Perception  Overall Perceptual Status: WFL    Sensation Status:  Sensation  Overall Sensation Status: Impaired (neuropathy BLE)    Vision and Hearing Status:  Vision  Vision: Impaired  Vision Exceptions:  (Needs glasses, but will not wear them per daughter)  Hearing  Hearing: Exceptions to [facility]  Hearing Exceptions: Hard of hearing/hearing concerns, No hearing aid     UE Function Status:    ROM:   LUE AROM (degrees)  LUE AROM : WFL  Left Hand AROM (degrees)  Left Hand AROM: WFL  RUE AROM (degrees)  RUE AROM : WFL  Right Hand AROM (degrees)  Right Hand AROM: WFL    Strength:  LUE Strength  Gross LUE Strength: WNL  L Hand General: 3+/5  LUE Strength Comment: 3+/5  RUE Strength  Gross RUE Strength: WFL  R Hand General: 3+/5  RUE Strength Comment: 3+/5    Coordination, Tone, Quality of Movement: Tone RUE  RUE Tone: Normotonic  Tone LUE  LUE Tone: Normotonic  Coordination  Movements Are Fluid And Coordinated: No  Coordination and Movement Description: Fine motor impairments, Decreased speed, Right UE, Left UE    D/C Recommendations:    *Unplanned d/c to medical floor.      Equipment Recommendations:  OT D/C Equipment  Equipment Needed: No    OT Follow Up:  OT D/C RECOMMENDATIONS  REQUIRES OT FOLLOW-UP: Yes  Type: Acute Rehab    Home Exercise Program Provided: [] Yes [x] No       Electronically signed by:    Vikash Carreon OT,    8/1/2022, 7:56 AM

## 2022-08-01 NOTE — PROGRESS NOTES
Tub/Shower unit, Bathroom Equipment: Grab bars in shower, Shower chair    Home Equipment: Rolling walker, Cane(Pt infrequemtly uses DME for ambulation and prefers to furniture walk in home)    ADL Assistance: Independent, [facility] Responsibilities: Yes    Ambulation Assistance: Independent, Transfer Assistance: Independent    Additional Comments: Son stops over 2 times daily           Subjective/HPI   no acute events. Denies cp nor so. Lying flat comfortable. Minimal abd discomfort. EKG:  pt refusing Monitor        Review of Systems:   Review of Systems   Constitutional: Negative. Negative for diaphoresis and fatigue. HENT: Negative. Eyes: Negative. Respiratory: Negative. Negative for cough, chest tightness, shortness of breath, wheezing and stridor. Cardiovascular: Negative. Negative for chest pain, palpitations and leg swelling. Gastrointestinal: Negative. Negative for blood in stool and nausea. Genitourinary: Negative. Musculoskeletal: Negative. Skin: Negative. Neurological:  Positive for dizziness. Negative for syncope, weakness and light-headedness. Hematological: Negative. Psychiatric/Behavioral: Negative. Physical Examination:    BP (!) 139/94   Pulse 66   Temp 98.2 °F (36.8 °C) (Oral)   Resp 18   Ht 5' (1.524 m)   Wt 164 lb (74.4 kg)   SpO2 92%   BMI 32.03 kg/m²    Physical Exam   Constitutional: She appears healthy. No distress. HENT:   Normal cephalic and Atraumatic   Eyes: Pupils are equal, round, and reactive to light. Neck: Thyroid normal. No JVD present. No neck adenopathy. No thyromegaly present. Cardiovascular: Normal rate, normal heart sounds, intact distal pulses and normal pulses. An irregularly irregular rhythm present. Pulmonary/Chest: Effort normal and breath sounds normal. She has no wheezes. She has no rales. She exhibits no tenderness. Abdominal: Soft.  Bowel sounds are normal. There is no abdominal tenderness. Musculoskeletal:         General: No tenderness or edema. Normal range of motion. Cervical back: Normal range of motion and neck supple. Neurological: She is alert and oriented to person, place, and time. Skin: Skin is warm. No cyanosis. Nails show no clubbing.      LABS:  CBC:   Lab Results   Component Value Date/Time    WBC 7.7 07/31/2022 05:16 AM    RBC 3.63 07/31/2022 05:16 AM    HGB 10.1 07/31/2022 05:16 AM    HCT 30.2 07/31/2022 05:16 AM    MCV 83.1 07/31/2022 05:16 AM    MCH 27.9 07/31/2022 05:16 AM    MCHC 33.6 07/31/2022 05:16 AM    RDW 15.3 07/31/2022 05:16 AM     07/31/2022 05:16 AM     CBC with Differential:    Lab Results   Component Value Date/Time    WBC 7.7 07/31/2022 05:16 AM    RBC 3.63 07/31/2022 05:16 AM    HGB 10.1 07/31/2022 05:16 AM    HCT 30.2 07/31/2022 05:16 AM     07/31/2022 05:16 AM    MCV 83.1 07/31/2022 05:16 AM    MCH 27.9 07/31/2022 05:16 AM    MCHC 33.6 07/31/2022 05:16 AM    RDW 15.3 07/31/2022 05:16 AM    BANDSPCT 2 07/24/2022 05:00 AM    METASPCT 2 07/24/2022 05:00 AM    LYMPHOPCT 7.0 07/24/2022 05:00 AM    MONOPCT 5.8 07/24/2022 05:00 AM    BASOPCT 1.0 07/24/2022 05:00 AM    MONOSABS 0.7 07/24/2022 05:00 AM    LYMPHSABS 0.8 07/24/2022 05:00 AM    EOSABS 0.0 07/24/2022 05:00 AM    BASOSABS 0.1 07/24/2022 05:00 AM     CMP:    Lab Results   Component Value Date/Time     07/25/2022 05:33 AM    K 3.8 07/25/2022 05:33 AM    K 3.2 07/24/2022 05:00 AM     07/25/2022 05:33 AM    CO2 30 07/25/2022 05:33 AM    BUN 13 07/25/2022 05:33 AM    CREATININE 0.52 07/25/2022 05:33 AM    GFRAA >60.0 07/25/2022 05:33 AM    LABGLOM >60.0 07/25/2022 05:33 AM    GLUCOSE 114 07/25/2022 05:33 AM    PROT 6.0 07/24/2022 05:00 AM    LABALBU 2.7 07/24/2022 05:00 AM    CALCIUM 8.7 07/25/2022 05:33 AM    BILITOT 0.3 07/24/2022 05:00 AM    ALKPHOS 51 07/24/2022 05:00 AM    AST 20 07/24/2022 05:00 AM    ALT 19 07/24/2022 05:00 AM     BMP:    Lab Results Component Value Date/Time     07/25/2022 05:33 AM    K 3.8 07/25/2022 05:33 AM    K 3.2 07/24/2022 05:00 AM     07/25/2022 05:33 AM    CO2 30 07/25/2022 05:33 AM    BUN 13 07/25/2022 05:33 AM    LABALBU 2.7 07/24/2022 05:00 AM    CREATININE 0.52 07/25/2022 05:33 AM    CALCIUM 8.7 07/25/2022 05:33 AM    GFRAA >60.0 07/25/2022 05:33 AM    LABGLOM >60.0 07/25/2022 05:33 AM    GLUCOSE 114 07/25/2022 05:33 AM     Magnesium:    Lab Results   Component Value Date/Time    MG 1.7 07/24/2022 05:00 AM     Troponin:    Lab Results   Component Value Date/Time    TROPONINI <0.010 07/18/2022 12:30 PM        Active Hospital Problems    Diagnosis Date Noted    Abscess [L02.91] 07/29/2022     Priority: Medium        Assessment/Plan:  Abd Abscess - s/p Drainage -    LVEF 60  Frequent PAF - rate is controlled on exam.   CAD- moderate - no angina. 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 Xarelto stopped and started Heparin. Discussed with IM and Dr. Sai Blank. Pt will best benefit form tertiary care transfer. Had long discussion with pt and daughter. Pt is not apposed to heart nor abd surgery despite informing that she will be at high rsik of perioperative complication to include death.    Awaiting bed at [facility]         Electronically signed by Dileep Sotelo MD on 8/1/2022 at 9:46 AM

## 2022-08-01 NOTE — DISCHARGE SUMMARY
Subjective: The patient complains of  moderate to severe acute    Right sided abdominal pain partially relieved by rest, PT, OT, drain and exacerbated by recent illness and exertion. I had been concerned about patients medical complexities and current active problems and barriers to progress including - recently presented with an abdominal mass felt to be either cancer or an abscess. Imaging was thinking it was more like an abscess. She was admitted to [facility] placed on antibiotics and a drain was placed under interventional radiology's guidance. I discussed current functional, rehabilitation, medical status with other rehabilitation providers including nursing and case management. According to recent nursing note, \" pATIENT UP TO br ENCOURAGED TO USE WALKER- DID WELL- SLOW BUT STEADY- WAS RELUCTANT TO USE WALKER. Jacqueline Red CALL LIGHT WITHIN REACH BEDSIDE TABLE WITH IN REACH. DENIES ANY PAIN dRAIN INTACT\". I am concerned about her occasional diarrhea and dizziness. I have added lactobacillus and Zofran. Still is having some abdominal pain at the drain site. She is very tough and does not like to take any medications unless is necessary. I had a long discussion with her and her son and we do feel that is necessary to improve her quality of life and ease her suffering. She will take scheduled Tylenol 3 a day and I will add as needed Norco if things get bad. She was sp   repeat CT of the abdomen with possible drain removal.  Unfortunately some dangerous clots were found in her aorta and she was discharged off the unit to the acute unit for possible [facility] transfer. 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  Xarelto stopped and started Heparin. Discussed with IM and Dr. Lucía Regan. Pt will best benefit form tertiary care transfer. Had long discussion with pt and daughter. Pt is not apposed to heart nor abd surgery despite informing that she will be at high rsik of perioperative complication to include death. \"  Greater than 45 minutes spent coordinating discharge. ROS x10: The patient also complains of severely impaired mobility and activities of daily living. Otherwise no new problems with vision, hearing, nose, mouth, throat, dermal, cardiovascular, GI, , pulmonary, musculoskeletal, psychiatric or neurological. See Rehab H&P on Rehab chart dated . Vital signs:  /74   Pulse 58   Temp 98.1 °F (36.7 °C)   Resp 16   Ht 5' (1.524 m)   Wt 170 lb 9.6 oz (77.4 kg)   SpO2 95%   BMI 33.32 kg/m²   I/O:   PO/Intake:  fair PO intake, no problems observed or reported. Bowel/Bladder:  continent,  immodium for diarrhea and urinary urgency. General:  Patient is well developed, adequately nourished, non-obese and     well kempt. HEENT:    PERRLA, hearing intact to loud voice, external inspection of ear     and nose benign. Inspection of lips, tongue and gums    Musculoskeletal: No significant change in strength or tone. All joints stable. Inspection and palpation of digits and nails show no clubbing,       cyanosis or inflammatory conditions. Neuro/Psychiatric: Affect: flat. Alert and oriented to person, place and     situation. No significant change in deep tendon reflexes or     Sensation    Lungs:  Diminished, CTA-B. Respiration effort is normal at rest.     Heart:   S1 = S2, RRR. No loud murmurs. Abdomen:  Soft, non-tender, no enlargement of liver or spleen-75cc of bloody drainage in drainage bag-right abdomen- tenderness at the drain site  Extremities:  Trace lower extremity edema,    tenderness.   Skin:   Intact to general survey, no visualized or palpated problems  right abd drain-scant drainage. Rehabilitation:  Physical therapy: FIMS:  Bed Mobility:      Transfers: Sit to Stand: Stand by assistance  Stand to sit: Contact guard assistance, Stand by assistance  Bed to Chair: Stand by assistance, WB Status: wbat  Ambulation  Surface: carpet  Device: Rolling Walker  Other Apparatus: O2, AFO, Left, Right  Assistance: Stand by assistance  Quality of Gait: Assist required for management of O2 tubing. Fwd flexed trunk. VCs to remain close to [employer] with change of direction and approach to chair. Gait Deviations: Slow Lea, Decreased step length  Distance: 50ft  Comments: Gait training at L shayy rail and R st cane SBA in linear direction 30ft. WC brought up after 30 ft. More Ambulation?: No, Stairs  # Steps : 4  Stairs Height: 6\"  Rails: Bilateral  Assistance: Minimal assistance    FIMS:  ,  ,      Occupational therapy: FIMS:   ,  , Assessment: Pt is a 80 y.o. female with above mentioned deficits impairing ability to complete ADL's at reported baseline. Pt may benefit from OT services to address defiicts and maximize safety and function during ADL's. Speech therapy: FIMS:        Lab/X-ray studies reviewed, analyzed and discussed with patient and staff:   Recent Results (from the past 24 hour(s))   APTT    Collection Time: 07/31/22  3:45 PM   Result Value Ref Range    aPTT 97.9 (H) 24.4 - 36.8 sec   APTT    Collection Time: 07/31/22 11:35 PM   Result Value Ref Range    aPTT 114.9 (H) 24.4 - 36.8 sec   APTT    Collection Time: 08/01/22  6:27 AM   Result Value Ref Range    aPTT 54.6 (H) 24.4 - 36.8 sec         Previous extensive, complex labs, notes and diagnostics reviewed and analyzed.      ALLERGIES:    Allergies as of 07/22/2022 - Fully Reviewed 07/22/2022   Allergen Reaction Noted    Latex  07/19/2017    Tape [adhesive tape]  06/28/2016      (please also verify by checking [facility])       Complex Physical Medicine & Rehab Issues addressed during rehab stay:   Severe abnormality of gait and mobility and impaired self-care and ADL's secondary to progressive weakness dt   CMT neuropathy . Functional and medical status deteriorated and patient needed to be transferred off the floor see details above. Bowel with occasional diarrhea on antibiotics, and Bladder dysfunction monitoring neurogenic bladder:  frequent toileting, ambulate to bathroom with assistance, check post void residuals. Check for C.difficile x1 if >2 loose stools in 24 hours, continue bowel & bladder program.  Monitor bowel and bladder function. Lactinex 2 PO every AC. MOM prn, Brown Bomb prn, Glycerin suppository prn, enema prn. Severe abdominal  pain as well as generalized OA pain,   Lumbar stenosis with neurogenic claudication: reassess pain every shift and prior to and after each therapy session, give Tylenol and prn Tylenol and Norco, modalities prn in therapy, Lidoderm, K-pad prn. Skin healing and breakdown risk:  continue pressure relief program.  Daily skin exams and reports from nursing. Severe fatigue due to nutritional and hydration deficiency: Add vitamin B12 vitamin D and CoQ10 continue to monitor I&Os, calorie counts prn, dietary consult prn. Add healthy HS snack. Acute episodic insomnia with situational adjustment disorder:  consider prn low dose Ambien, monitor for day time sedation. Add HS \"Tuck In\"  Falls risk elevated:  patient to use call light to get nursing assistance to get up, bed and chair alarm. Elevated DVT risk: progressive activities in PT, continue prophylaxis PORTILLO hose, elevation and Xarelto. Complex discharge planning: Plan for discharge Friday, August 5, 2022 to home alone with support from her son who lives close by and her daughter-in-law. We will also have home health care come out PT OT RN aide and push possibly a .    Progress toward her final weekly team meeting    Monday to re-assess progress towards goals, discuss and address social, psychological and medical comorbidities and to Depression-emotional support provided daily, vitamin B12, encourage participation in rehabilitation support group and recreational therapy, adjust/add medications (B12, Celexa)  Abdominal mass abscess versus cancer-drain still in place pending cytology-patient is on IV Zosyn pending cultures. Closely monitor drainage from her drain site. GERD-Elevate head of bed after meals, monitor stools for blood, lowest effective dose of PPI, consider Tums. Yeast rash and immunosuppression-Micatin powder and Floranex       Focus on emotional health and caregiver involvement in care. Dax CT of abdomen consider removing drain if the mass and or abscess has resolved. Summer Waters D.O., PM&R     Attending    [address]   Focus on endurance, activity pacing, reassessing rehab goals and discharge planning.

## 2022-08-01 NOTE — FLOWSHEET NOTE
Assume care, no changed from previous assessment. Heparin gtt infusing 2nd APTT result is back at 114.9, held for 60 minutes. Still waiting for bed in [facility], for transfer when bed available. 0600- drain emptied 50cc.

## 2022-08-01 NOTE — PROGRESS NOTES
Hospitalist Progress Note      PCP: Shawn Carroll MD    Date of Admission: 7/29/2022    Chief Complaint:  no acute events, afebrile, stable HD, spoke with daughter at the bedside, she and her brother do not want her mother to go to the [facility] anymore    Medications:  Reviewed    Infusion Medications    sodium chloride      heparin (porcine) 14 Units/kg/hr (08/01/22 0743)     Scheduled Medications    carvedilol  6.25 mg Oral BID WC    cefTRIAXone (ROCEPHIN) IV  1,000 mg IntraVENous Q24H    citalopram  20 mg Oral Daily    ferrous sulfate  325 mg Oral Every Other Day    lactobacillus acidophilus  2 tablet Oral TID    levothyroxine  88 mcg Oral Daily    miconazole   Topical BID    pantoprazole  40 mg Oral QAM AC    sodium chloride flush  5-40 mL IntraVENous 2 times per day     PRN Meds: sodium chloride, acetaminophen, HYDROcodone 5 mg - acetaminophen, loperamide, ondansetron **OR** ondansetron, polyethylene glycol, potassium chloride, sodium chloride flush, heparin (porcine), heparin (porcine)      Intake/Output Summary (Last 24 hours) at 8/1/2022 1250  Last data filed at 8/1/2022 1217  Gross per 24 hour   Intake 840 ml   Output 690 ml   Net 150 ml       Exam:    BP (!) 139/94   Pulse 66   Temp 98.2 °F (36.8 °C) (Oral)   Resp 18   Ht 5' (1.524 m)   Wt 164 lb (74.4 kg)   SpO2 92%   BMI 32.03 kg/m²     General appearance: appears stated age and cooperative. Respiratory:  clear to auscultation, bilaterally  Cardiovascular: Regular rate and rhythm, S1/S2  Abdomen: Soft, active bowel sounds. Musculoskeletal: No edema bilaterally. Labs:   Recent Labs     07/31/22  0516   WBC 7.7   HGB 10.1*   HCT 30.2*        No results for input(s): NA, K, CL, CO2, BUN, CREATININE, CALCIUM, PHOS in the last 72 hours. Invalid input(s): MAGNES  No results for input(s): AST, ALT, BILIDIR, BILITOT, ALKPHOS in the last 72 hours. No results for input(s): INR in the last 72 hours.   No results for input(s): CKTOTAL, TROPONINI in the last 72 hours. Urinalysis:      Lab Results   Component Value Date/Time    NITRU Negative 07/18/2022 12:30 PM    WBCUA 20-50 07/18/2022 12:30 PM    BACTERIA Negative 07/18/2022 12:30 PM    RBCUA 6-10 07/18/2022 12:30 PM    BLOODU SMALL 07/18/2022 12:30 PM    SPECGRAV 1.044 07/18/2022 12:30 PM    GLUCOSEU Negative 07/18/2022 12:30 PM       Radiology:  No orders to display           Assessment/Plan:    89 y.o.,female who has PMH of CAD, DVT, PAF, HTN, pleural effusion, combined HF, ascending and descending aortic aneurysm, RLQ abscess/mass s/p drainage who was transferred from acute rehab after repeat CT showed enlarging AA with new large mural thrombus involving the descending portion of the aortic arch. Aortic aneurysm with new mural thrombus  - on Heparin infusion  - management per cardiology    RLQ mass/abscess  - s/p drain placed by IR  - culture grew Strep. Viridans  - on IV Rocephin  - followed by ID and oncology    Anemia   - follow H/H    Chronic respiratory failure  - on 3 liters of NC      Disposition - will cancer transfer to [facility] per family request,  following          Electronically signed by Syed Swan MD on 8/1/2022 at 12:50 PM

## 2022-08-01 NOTE — CONSULTS
Physical Medicine & Rehabilitation  Consult Note      Admitting Physician: Lucinda Johnson MD    Primary Care Provider: eRe Reddy MD     Reason for Consult:  Asses rehab needs, promote physical and mental function, analyze level of care to determine rehab needs, improve ability to actively participate in the rehabilitation process, and decrease likelihood of re-admit to the hospital after discharge. History of Present Illness:    Roxana Anthony is a 80 y.o. female admitted to [facility] on 7/29/2022.     80-year-old female with a history of Charcot-Arleen-Tooth neuropathy and severe arthritis is recently had a very erick medical course because of abdominal mass/abscess with drain and abdominal pain. She also was found to have clots and an abdominal aneurysm-The ascending aorta now measures 5.5 cm, this is larger when compared to prior study dating 5.3 cm. 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 .  After much conferencing with the acute medical service and patient and patient's family they have decided on no acute or heroic measures that she would not tolerate surgery and her best option for improving her quality of life is stabilizing if function are to return to acute rehab continue the rehabilitation program which has been going well. Abdominal Pain  This is a recurrent problem. The current episode started in the past 7 days. The pain is at a severity of 6/10. The quality of the pain is aching. Associated symptoms include arthralgias and myalgias. Pertinent negatives include no constipation, diarrhea, dysuria, fever, frequency, headaches, hematuria, nausea or vomiting. The pain is aggravated by movement. The pain is relieved by Being still. She has tried acetaminophen and oral narcotic analgesics for the symptoms. The treatment provided mild relief.  Prior diagnostic workup includes GI consult. Her past medical history is significant for abdominal surgery. This is a recurrent problem. The current episode started in the past 7 days. Associated symptoms include abdominal pain, arthralgias and myalgias. Pertinent negatives include no chest pain, chills, congestion, coughing, diaphoresis, fatigue, fever, headaches, nausea, neck pain, rash, sore throat, vomiting or weakness. The symptoms are aggravated by walking. She has tried rest for the symptoms. The treatment provided mild relief. I reviewed recent nursing notes discussed care with acute care providers, \" The patient is re-admitted for a new descending aorta thrombus. Recently with a cystic mass and abdominal drain placed. Repeat CT showed a new thrombus in descending aorta while off Xarelto just a few days. She was admitted and started IV Heparin. Jorgito Perez Jorgito Perez Jorgito Perez Based on her age, I don't feel she is a candidate for \"heroic\" surgery efforts as risk likely exceeds benefit. I recommend continue IV Heparin or can change to Lovenox 1mg/kg q 12 hours and transfer back to rehab. Probably ok to remove abdominal drain. Repeat CT in about a week and then change back to Xarelto if improved. \". Events from the previous 24 hours reviewed  pain has improved . Their inpatient work up has included:    Imaging:  Imaging and other studies reviewed and discussed with patient and staff    CT ABDOMEN PELVIS  7/29/2022  1. The previously placed abscess drain in the right lower quadrant appears to be in the periphery of the abscess with loculations now more apparent in the fluid collection. The fluid collection appears to be grossly the same size as prior to drainage. 2.New mural thrombus is noted in the distal aortic arch extending to the lower thoracic aorta which was not seen on prior study. Findings discussed with Dr. Nori Cantrell 3. Note is again made of an aortic aneurysm involving the ascending aorta, aortic arch, and descending aorta.  Based on current images, the ascending aorta appears to be 2 mm larger when compared to study dating February 17, 2021. The ascending aorta now measures 5.5 cm. Previously the arch measured 5.3 cm. 4.Note is again made of left lower small pleural effusion with bilateral basilar opacities which may represent atelectasis. 5.Note is again made of a large cystic mass in the pelvis. COMPARISON: CT dating February 17, 2021, July 18, 2022, and July 21, 2022 DIAGNOSIS: Previous cystic lesion adjacent to the ascending colon status post drain placement     COMMENTS:  IMPAIRED MOBILITY AND ADLs DUE TO CHARCOT JENA TOOTH NEUROPATHY TECHNIQUE: Spiral scanning of the abdomen and pelvis was performed after administration of intravenous contrast. CT Dose-Length Product (estimate related to radiation exposure from this exam):  1386.22  mGy*cm. CT ABDOMEN: Bibasilar atelectasis with left small pleural effusion. Underlying nodules cannot be excluded. The heart is enlarged. The liver is of normal size and attenuation without focal lesions. The spleen is of normal size and attenuation without focal lesions. Pancreas shows no signs of focal mass or peripancreatic fluid collection. Kidneys are normal in size. There is no hydronephrosis. No renal mass is identified. Adrenal glands are unremarkable. Note is again made of a thoracoabdominal aortic aneurysm. The ascending aorta now measures 5.5 cm, this is larger when compared to prior study dating 5.3 cm. 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 This large thrombus was not seen on prior study. No significant retroperitoneal adenopathy or ascites is identified. Again seen is a cystic lesion involving the proximal ascending aorta which now appears to be more septated than prior. The previously placed drain is now in the periphery of the lesion.   CT PELVIS:  A large cystic mass is again seen in the lower pelvis. CT ABDOMEN PELVIS   7/18/2022  CT of the Abdomen and Pelvis without intravenous contrast medium History:  Approximate 1 week history of abdominal pain Technical Factors: CT imaging of the abdomen and pelvis were obtained and formatted as 5 mm contiguous axial images from the domes of the diaphragm to the symphysis pubis. Sagittal and coronal reconstructions were also obtained. Oral contrast medium: Barium sulfate, 450 mL. Intravenous contrast medium:  None. Comparison:  CT abdomen pelvis, July 18, 2022, 1444 hours, February 8, 2021. Findings: Residual intravenous contrast medium from recent CT examination identified. Lungs:  Lung bases clear. Cardiac size enlarged, unchanged. Calcification at level of mitral valve. Small hiatal hernia. Liver:  Normal in size, shape, and attenuation. Bile Ducts:  Normal in caliber. Gallbladder:  No stones or wall thickening. Pancreas:  Normal without masses, cysts, ductal dilatation or calcification. Spleen:  Normal in size without masses or calcifications. No splenules. Kidneys:  Normal in size. No hydronephrosis, masses, or stones. Adrenals:  Normal. Small bowel:  Normal in caliber. See \"peritoneum \". Appendix:  Not visualized. Colon:  Normal in caliber. See \"peritoneum \". Peritoneum:  No free air. A bilobed, 10.0 x 8.0 x 10.6 cm mass having central rounded septated areas of low attenuation, 3.7 x 3.5 x 5.4 cm, and displacing cecum and ascending colon anteriorly (series 2, image 50, series 601, image 54, series 602, image 36). No calcification identified. Trace adjacent fat stranding demonstrated. Vessels: Aorta normal in course and caliber. Lymph nodes:  Retroperitoneal:  No enlarged retroperitoneal lymph nodes. Mesenteric:  No enlarged mesenteric lymph nodes. Pelvic: No enlarged pelvic lymph nodes. Ureters: Normal in course and caliber. No calcifications. Bladder: No wall thickening.  Reproductive organs: 8.0 x 5.8 x 7.5 cm left adnexal cyst displacing urinary bladder right and laterally, anteriorly, and inferiorly (series 2, image 68, series 601, image 42, series 602, image 52). Abdominal Wall: Fat identified bilateral inguinal canals. Bones:  No bone lesions. Multilevel disc space narrowing lumbar spine. No post operative changes. Complex 10.0 x 8.0 x 10.6 cm mass with septated central low attenuation fluid collections as discussed. Mass displaces cecum and ascending colon anteriorly. Differential includes malignancy including appendiceal mucocele/carcinoma, as well as retroperitoneal sarcoma with central necrotic change. Infection/abscess secondary consideration, minimal presence of adjacent inflammatory change. 8.0 x 5.8 x 7.5 cm left adnexal cysts as discussed, most likely ovarian in etiology. If clinical concern warrants, pelvic sonography may be obtained for further evaluation. Other findings discussed. All CT scans at this facility use dose modulation, iterative reconstruction, and/or weight based dosing when appropriate to reduce radiation dose to as low as reasonably achievable. CT Head WO Contrast    Result Date: 7/18/2022  CT OF THE BRAIN WITHOUT CONTRAST HISTORY: Victorina Jones is a Female of 80 years age with history of headache and dizziness. COMPARISON: July 19, 2017 TECHNIQUE:  Spiral CT examination of the brain was performed without intravenous contrast.  Images were obtained from the base of the skull up to the convexities in axial slices, and then examined in the blood, brain parenchymal and bony windows. FINDINGS:  No acute changes are noted. There is no evidence for mass, mass effect or midline shift. No abnormal extra-axial fluid collections are appreciated. Age-appropriate cortical volume loss is seen. There are patchy areas of hypoattenuation in a sub-cortical, central white and periventricular distribution consistent with nonspecific ischemic small vessel disease.  Calcifications are again seen in the basal ganglia. There are no acute parenchymal alterations, blood byproducts or abnormal extracerebral fluid. The calvarium is grossly intact. The visualized paranasal sinuses show mucoceles or polyps in the maxillary sinuses. Mild fluid is seen in the mastoid air cells that is not significantly changed and is consistent with chronic changes. Kim Stands 1.Age-appropriate cortical atrophy and periventricular microangiopathy. When compared to previous study there has been no significant interval change. 2. No acute hemorrhage or extra-axial fluid collection All CT scans at this facility use dose modulation, iterative reconstruction, and/or weight based dosing when appropriate to reduce radiation dose to as low as reasonably achievable. CT ABSCESS DRAINAGE W CATH PLACEMENT S&I    1. Successful drainage and drain placement of right colonic soft tissue mass with central fluid collection. 2.Fluid was aspirated and sent for microbiology and cytology analysis. Additionally a 10 Maori drain was placed yielding a thick red to yellow fluid with internal yellow debris. HISTORY: Maxx Marie is a Female of 80 years age. DIAGNOSIS:   Right abdominal mass with possible fluid collection vs necrotic tissue COMPARISON: None available. CT Dose-Length Product (estimate related to radiation exposure from this exam):  541.6  mGy*cm. PROCEDURE: Following the discussion of the procedure, alternatives, risks versus benefits, informed consent was obtained from the patient. Specifically, risks of after-biopsy pain at the site, rare possibility of excessive hemorrhage, infection, injury to the adjacent organs were discussed and the patient verbalized understanding. Pre-procedure evaluation confirmed that the patient was an appropriate candidate for conscious sedation. Adequate sedation was maintained during the entire procedure.   Vital signs, pulse oximetry, and response to verbal commands were monitored and recorded by the nurse throughout the procedure and the recovery period. Medical information was entered in the medical record including the medications and dosages used. The patient returned to baseline neurologic and physiologic status prior to leaving the department. No immediate sedation related complications were noted. Medication for conscious sedation was administered via IV route. 45 minutes of conscious sedation was provided. Following universal protocol, patient and site verification was performed with a \"timeout\" prior to the procedure. The patient was placed on the CT table in supine  position and the right lateral abdomen area was prepped and draped in usual sterile fashion. Using the usual sterile conditions, lidocaine and CT guidance, the fluid collection within soft tissue density  was accessed using a Yueh needle. After confirmation of appropriate localization of the needle, aspiration yielded a thick red to yellow fluid which was sent for microbiology and cytology analysis. Following that an Amplatz wire was placed through the Yueh sheath into the abscess under CT guidance. The tract was serially dilated with 6, 8, and 10 [place] dilators respectively. Following that a 10 Kyrgyz drainage catheter was advanced over the wire into the abscess under CT guidance and the anchoring loop was formed. Catheter was sutured to the skin and and secured with Percufix device. The patient tolerated the procedure well. No immediate complications identified. The patient left the CT suite in supine position to the recovery room in stable condition. Total local anesthetic used: lidocaine, approximately 15 mL. Estimated blood loss:  None. CT GUIDED NEEDLE PLACEMENT    1. Successful drainage and drain placement of right colonic soft tissue mass with central fluid collection. 2.Fluid was aspirated and sent for microbiology and cytology analysis.  Additionally a 10 Kyrgyz drain was placed yielding a thick red to yellow fluid with internal yellow debris. HISTORY: Enma Baumann is a Female of 80 years age. DIAGNOSIS:   Right abdominal mass with possible fluid collection vs necrotic tissue COMPARISON: None available. CT Dose-Length Product (estimate related to radiation exposure from this exam):  541.6  mGy*cm. PROCEDURE: Following the discussion of the procedure, alternatives, risks versus benefits, informed consent was obtained from the patient. Specifically, risks of after-biopsy pain at the site, rare possibility of excessive hemorrhage, infection, injury to the adjacent organs were discussed and the patient verbalized understanding. Pre-procedure evaluation confirmed that the patient was an appropriate candidate for conscious sedation. Adequate sedation was maintained during the entire procedure. Vital signs, pulse oximetry, and response to verbal commands were monitored and recorded by the nurse throughout the procedure and the recovery period. Medical information was entered in the medical record including the medications and dosages used. The patient returned to baseline neurologic and physiologic status prior to leaving the department. No immediate sedation related complications were noted. Medication for conscious sedation was administered via IV route. 45 minutes of conscious sedation was provided. Following universal protocol, patient and site verification was performed with a \"timeout\" prior to the procedure. The patient was placed on the CT table in supine  position and the right lateral abdomen area was prepped and draped in usual sterile fashion. Using the usual sterile conditions, lidocaine and CT guidance, the fluid collection within soft tissue density  was accessed using a Yueh needle. After confirmation of appropriate localization of the needle, aspiration yielded a thick red to yellow fluid which was sent for microbiology and cytology analysis.  Following that an Amplatz wire was placed through the Yueh sheath into the abscess under CT guidance. The tract was serially dilated with 6, 8, and 10 [place] dilators respectively. Following that a 10 [place] drainage catheter was advanced over the wire into the abscess under CT guidance and the anchoring loop was formed. Catheter was sutured to the skin and and secured with Percufix device. The patient tolerated the procedure well. No immediate complications identified. The patient left the CT suite in supine position to the recovery room in stable condition. Total local anesthetic used: lidocaine, approximately 15 mL. Estimated blood loss:  None. CT ABDOMEN PELVIS W IV CONTRAST Additional Contrast? None    Result Date: 7/18/2022  Comparison: February 8, 2021 History: Right lower quadrant abdominal pain  Technique: CT ABDOMEN PELVIS W IV CONTRAST Helically Acquired CT of the  abdomen and pelvis was performed during the intravenous infusion of 100 mL of Isovue-370. Axial delayed images were also performed. Reformatted sagittal and coronal images were also  obtained. Findings: The visualized bases of the lungs shows no consolidating pneumonia or pleural effusion. There is atelectasis seen in the bases. The thoracic aorta and visualized portion is tortuous and mildly dilated at 4 cm in greatest transverse diameter which is stable. . In the right lower quadrant of the abdomen there are multiple bowel loops seen that are peripheral to hypodense collections which could be from fluid or proteinaceous collections such as abscess. This the loops of bowel appear to be a ascending colon and  ileum. The largest walled off collections measure 4.5 x 3.8 x 3.3 cm and 4.4 x 3.4 x 4.2 cm. There is pericolic stranding seen. The left colon is unremarkable. A cystic structure is again seen in the pelvis that measures 6.7 x 8.6 x 9.8 cm. A small amount of free fluid is seen in the posterior pelvis. Bilateral inguinal hernias are seen. The hernia on the left contains fat.  The hernia on the right contains a loop of small bowel. No bowel obstruction is seen. The liver is decreased in attenuation and is enlarged. The spleen, pancreas and gallbladder and adrenal glands are unremarkable. Bilaterally both kidneys show uptake of contrast with no evidence for hydronephrosis or renal calculi. Prominent atherosclerotic calcifications are seen. The bladder is partially decompressed. The bladder wall however also appears to be thickened. Degenerative changes are seen in the spine at multiple levels. Intervertebral disc space narrowing is seen at L1 to. Slight anterolisthesis of L4 on L5 is noted. There is vacuum anomaly seen at all levels in the lumbar spine. Impression: 1. In the right lower quadrant of the abdomen, pericolic abscesses versus pericolic mass is seen. This is thought more likely abscess. 2. A cystic mass is again seen in the pelvis and is likely in the ovary. It is unchanged from previous examination The patient will be given oral contrast and rescanned to evaluate the right lower quadrant.   All CT scans at this facility use dose modulation, iterative reconstruction, and/or weight based dosing              Labs:    Labs reviewed and discussed with patient and staff    Lab Results   Component Value Date/Time    POCGLU 113 02/06/2021 04:16 PM    POCGLU 134 02/06/2021 11:21 AM     Lab Results   Component Value Date/Time     08/01/2022 02:35 PM    K 3.6 08/01/2022 02:35 PM    K 3.2 07/24/2022 05:00 AM     08/01/2022 02:35 PM    CO2 27 08/01/2022 02:35 PM    BUN 11 08/01/2022 02:35 PM    CREATININE 0.60 08/01/2022 02:35 PM    CALCIUM 9.3 08/01/2022 02:35 PM    LABALBU 3.0 08/01/2022 02:35 PM    BILITOT 0.3 07/24/2022 05:00 AM    ALKPHOS 51 07/24/2022 05:00 AM    AST 20 07/24/2022 05:00 AM    ALT 19 07/24/2022 05:00 AM     Lab Results   Component Value Date/Time    WBC 7.5 08/01/2022 02:35 PM    RBC 3.76 08/01/2022 02:35 PM    HGB 10.1 08/01/2022 02:35 PM    HCT 31.8 08/01/2022 02:35 PM    MCV 84.5 08/01/2022 02:35 PM    MCH 27.0 08/01/2022 02:35 PM    MCHC 31.9 08/01/2022 02:35 PM    RDW 15.4 08/01/2022 02:35 PM     08/01/2022 02:35 PM     Lab Results   Component Value Date/Time    VITD25 56.0 10/02/2020 11:59 AM     Lab Results   Component Value Date/Time    COLORU Yellow 07/18/2022 12:30 PM    NITRU Negative 07/18/2022 12:30 PM    GLUCOSEU Negative 07/18/2022 12:30 PM    KETUA TRACE 07/18/2022 12:30 PM    UROBILINOGEN 0.2 07/18/2022 12:30 PM    BILIRUBINUR Negative 07/18/2022 12:30 PM     Lab Results   Component Value Date/Time    PROTIME 17.4 07/29/2022 12:00 PM     Lab Results   Component Value Date/Time    INR 1.5 07/29/2022 12:00 PM         I discussed results with patient. Current Rehabilitation Assessments:    Rehabilitation:  Physical Therapy  Bed mobility:     Transfers:     Gait:      Stairs:     W/C mobility:         Occupational therapy:                      Speech therapy:            Diet/Swallow:                         COGNITION  OT:    SP:              Re-evals pending      Past Medical History:        Diagnosis Date    Ascending aortic aneurysm ([facility] 6/23/2017    Charcot Arleen Tooth muscular atrophy     CHF (congestive heart failure) (HCC)     Chronic diastolic CHF (congestive heart failure) ([facility] 4/1/2022    Depression     Descending aortic aneurysm ([facility] 6/23/2017    Essential hypertension 2/16/2018    Headache     HTN (hypertension)     Impaired mobility and activities of daily living     Lumbar stenosis with neurogenic claudication     Myelopathy [facility])     Osteoarthritis          PastSurgical History:        Procedure Laterality Date    JOINT REPLACEMENT Bilateral     knees    OTHER SURGICAL HISTORY Right 07/21/2022    cat scan guided abdominal mass aspiration with drain placement by Dr. Andrzej Keene Left 02/25/2021    LEFT PLEURAL CATHETER INSERTION performed by Kemar Smith MD at [place] [id number] Left 01/29/2021    total of 755 cc removed per Dr Nader Amin specimen sent to lab         Allergies: Allergies   Allergen Reactions    Latex     Tape Geovannasonal Degroot Tape]           CurrentMedications:   Current Facility-Administered Medications: 0.9 % sodium chloride infusion, , IntraVENous, PRN  acetaminophen (TYLENOL) tablet 650 mg, 650 mg, Oral, Q6H PRN  carvedilol (COREG) tablet 6.25 mg, 6.25 mg, Oral, BID WC  cefTRIAXone (ROCEPHIN) 1,000 mg in dextrose 5 % 50 mL IVPB mini-bag, 1,000 mg, IntraVENous, Q24H  citalopram (CELEXA) tablet 20 mg, 20 mg, Oral, Daily  ferrous sulfate (IRON 325) tablet 325 mg, 325 mg, Oral, Every Other Day  HYDROcodone-acetaminophen (NORCO) 5-325 MG per tablet 1 tablet, 1 tablet, Oral, Q4H PRN  lactobacillus acidophilus (FLORANEX) 2 tablet, 2 tablet, Oral, TID  levothyroxine (SYNTHROID) tablet 88 mcg, 88 mcg, Oral, Daily  loperamide (IMODIUM) capsule 2 mg, 2 mg, Oral, 4x Daily PRN  miconazole (MICOTIN) 2 % powder, , Topical, BID  ondansetron (ZOFRAN-ODT) disintegrating tablet 4 mg, 4 mg, Oral, Q8H PRN **OR** ondansetron (ZOFRAN) injection 4 mg, 4 mg, IntraVENous, Q6H PRN  pantoprazole (PROTONIX) tablet 40 mg, 40 mg, Oral, QAM AC  polyethylene glycol (GLYCOLAX) packet 17 g, 17 g, Oral, Daily PRN  potassium chloride 10 mEq/100 mL IVPB (Peripheral Line), 10 mEq, IntraVENous, PRN  sodium chloride flush 0.9 % injection 5-40 mL, 5-40 mL, IntraVENous, 2 times per day  sodium chloride flush 0.9 % injection 5-40 mL, 5-40 mL, IntraVENous, PRN  heparin (porcine) injection 3,100 Units, 40 Units/kg, IntraVENous, PRN  heparin (porcine) injection 6,190 Units, 80 Units/kg, IntraVENous, PRN  heparin 25,000 units in dextrose 5% 250 mL (premix) infusion, 5-30 Units/kg/hr, IntraVENous, Continuous      Social History:  Social History     Socioeconomic History    Marital status:       Spouse name: Not on file    Number of children: 2    Years of education: Not on file    Highest education level: Not on file   Occupational History    Not on file   Tobacco Use Smoking status: Never    Smokeless tobacco: Never   Substance and Sexual Activity    Alcohol use: No     Alcohol/week: 0.0 standard drinks    Drug use: No    Sexual activity: Not on file   Other Topics Concern    Not on file   Social History Narrative    , Lives With: Alone, son lives down the street, dtr is in the area    Type of Home: [facility]  in [address]: One level    Home Access: Stairs to enter with rails- Number of Steps: 2- Rails: Both    Bathroom Shower/Tub: Tub/Shower unit, Bathroom Equipment: Grab bars in shower, Shower chair    Home Equipment: Rolling walker, Cane(Pt infrequemtly uses DME for ambulation and prefers to furniture walk in home)    ADL Assistance: Independent, [address]: [facility] Responsibilities: Yes    Ambulation Assistance: Independent, Transfer Assistance: Independent    Additional Comments: Son stops over 2 times daily         Social Determinants of Health     Financial Resource Strain: Not on file   Food Insecurity: Not on file   Transportation Needs: Not on file   Physical Activity: Not on file   Stress: Not on file   Social Connections: Not on file   Intimate Partner Violence: Not on file   Housing Stability: Not on file        This history was obtained from family and caregivers and discussed to confirm. Family History:       Problem Relation Age of Onset    Arthritis Mother     Arthritis Father     High Blood Pressure Father        Review of Systems:  Review of Systems   Constitutional: Negative. Negative for chills, diaphoresis, fatigue and fever. HENT: Negative. Negative for congestion, ear pain, hearing loss, sore throat and tinnitus. Eyes: Negative. Negative for photophobia. Respiratory: Negative. Negative for cough, chest tightness, shortness of breath, wheezing and stridor. Cardiovascular: Negative. Negative for chest pain, palpitations and leg swelling.    Gastrointestinal:  Positive for abdominal pain. Negative for blood in stool, constipation, diarrhea, nausea and vomiting. Genitourinary: Negative. Negative for dysuria, flank pain, frequency, hematuria and urgency. Musculoskeletal:  Positive for arthralgias and myalgias. Negative for back pain and neck pain. Skin:  Positive for wound. Negative for rash. Allergic/Immunologic: Negative for environmental allergies. Neurological:  Positive for dizziness. Negative for tremors, syncope, weakness, light-headedness and headaches. Hematological: Negative. Does not bruise/bleed easily. Psychiatric/Behavioral: Negative. Negative for hallucinations and suicidal ideas. The patient is not nervous/anxious. Physical Exam:  /71   Pulse 83   Temp 98.3 °F (36.8 °C) (Oral)   Resp 18   Ht 5' (1.524 m)   Wt 164 lb (74.4 kg)   SpO2 93%   BMI 32.03 kg/m²      Physical Exam  Constitutional:       General: She is not in acute distress. Appearance: She is well-developed. She is ill-appearing. She is not toxic-appearing or diaphoretic. HENT:      Head: Normocephalic and atraumatic. Not macrocephalic and not microcephalic. No raccoon eyes or Hurtado's sign. Nose: Nose normal.      Mouth/Throat:      Pharynx: No oropharyngeal exudate. Eyes:      General: No scleral icterus. Right eye: No discharge. Left eye: No discharge. Conjunctiva/sclera: Conjunctivae normal.      Pupils: Pupils are equal, round, and reactive to light. Neck:      Thyroid: No thyromegaly. Vascular: Normal carotid pulses. No carotid bruit, hepatojugular reflux or JVD. Trachea: No tracheal deviation. Cardiovascular:      Rate and Rhythm: Normal rate and regular rhythm. Heart sounds: Normal heart sounds. No murmur heard. No friction rub. No gallop. Pulmonary:      Effort: Pulmonary effort is normal. No respiratory distress. Breath sounds: No stridor. Decreased breath sounds and wheezing present. No rales. Mental Status   Oriented to person, place, and time. Level of consciousness: alert  Knowledge: good. Cranial Nerves     CN III, IV, VI   Pupils are equal, round, and reactive to light.     Gait, Coordination, and Reflexes     Reflexes   Right brachioradialis: 1+  Left brachioradialis: 1+  Right biceps: 1+  Left biceps: 1+  Right triceps: 1+  Left triceps: 1+  Right patellar: 0  Left patellar: 0  Right achilles: 0  Left achilles: 0          Diagnostics:    Recent Results (from the past 24 hour(s))   APTT    Collection Time: 07/31/22 11:35 PM   Result Value Ref Range    aPTT 114.9 (H) 24.4 - 36.8 sec   APTT    Collection Time: 08/01/22  6:27 AM   Result Value Ref Range    aPTT 54.6 (H) 24.4 - 36.8 sec   APTT    Collection Time: 08/01/22  2:35 PM   Result Value Ref Range    aPTT 62.1 (H) 24.4 - 36.8 sec   CBC    Collection Time: 08/01/22  2:35 PM   Result Value Ref Range    WBC 7.5 4.8 - 10.8 K/uL    RBC 3.76 (L) 4.20 - 5.40 M/uL    Hemoglobin 10.1 (L) 12.0 - 16.0 g/dL    Hematocrit 31.8 (L) 37.0 - 47.0 %    MCV 84.5 82.0 - 100.0 fL    MCH 27.0 27.0 - 31.3 pg    MCHC 31.9 (L) 33.0 - 37.0 %    RDW 15.4 (H) 11.5 - 14.5 %    Platelets 368 306 - 717 K/uL   Renal Function Panel    Collection Time: 08/01/22  2:35 PM   Result Value Ref Range    Sodium 140 135 - 144 mEq/L    Potassium 3.6 3.4 - 4.9 mEq/L    Chloride 102 95 - 107 mEq/L    CO2 27 20 - 31 mEq/L    Anion Gap 11 9 - 15 mEq/L    Glucose 130 (H) 70 - 99 mg/dL    BUN 11 8 - 23 mg/dL    Creatinine 0.60 0.50 - 0.90 mg/dL    GFR Non-African American >60.0 >60    GFR  >60.0 >60    Calcium 9.3 8.5 - 9.9 mg/dL    Phosphorus 3.1 2.3 - 4.8 mg/dL    Albumin 3.0 (L) 3.5 - 4.6 g/dL   Magnesium    Collection Time: 08/01/22  2:35 PM   Result Value Ref Range    Magnesium 1.9 1.7 - 2.4 mg/dL   High sensitivity CRP    Collection Time: 08/01/22  2:35 PM   Result Value Ref Range    CRP High Sensitivity 45.6 (H) 0.0 - 5.0 mg/L   Procalcitonin    Collection Time: patient and family. Please see pre-admission screen note for further details. I discussed acute rehab with the patient and verify that the patient is able and willing to participate in 3 hours of therapy a day. Rehab and Acute Care Case Management has also reinforced this expectation. This patient requires multidisciplinary rehabilitation treatment, including daily care and management from a PM&R physician, 24-hour rehabilitation nursing, Physical Therapy, Occupational Therapy, rehabilitation psychology, consideration of speech and language pathology, recreational therapy, nutritional services, and a rehabilitation . I feel that it is reasonable to plan for a discharge to home setting after acute rehab. Specialized nursing care to focus on: Bowel and bladder issues-Monitor for urinary retention-check PVRs, bladder scan--cath if no void. Wound management re  abdomen -pressure relief protocols-side to side turns  IV medication administration  IV antibiotic    Monitor endurance and if necessary spread therapy out over a 7-day window-adding scheduled rest breaks when needed. Focus on energy conservation. Monitor heart rate and   cardiac medications effects on heart rate and blood pressure before, during and after therapy. Progress toward endurance training with pulse ox monitoring for saturation and heart rate. continue to monitor closely for dehydration-- Improve hydration and nutrition by adding Vitamin B12 shot times one, adding Protein supplements and push PO fluids. Treat and monitor for higher level cognitive deficits, focus on difficulty with sequencing and problem-solving. Focus on higher-level balance and falls risk issues focusing on balance training and monitoring for orthostasis. Above recommendations are indicated to address medical complexity and need for appropriate rehab services.   Will tailor individual care and rehab plan per individuals needs re as scheduled rest breaks and spread therapy out over a 7-day window. Focus of today's plan-   evaluate in PT and OT and transition back to acute rehab      Required Certification Data (potential inpatient rehabilitation facility patient's only)    Deficits:nutrition, mobility, impaired gait, decreased endurance, deconditioning , impaired mobility level increasing the risk of venous thromboembolism, and healing surgical sites, pain limiting function, and wound healing     Disability:mobility, locomotion, and self care    Potential barriers to progress/discharge:complex medical conditions and severe pain         It was my pleasure to evaluate [address] [place] [address] today. Please call [phone] with questions.     Cynthia Ireland, DO

## 2022-08-01 NOTE — CARE COORDINATION
STILL AWAITING [facility] BED SINCE 7/29.    1430  PER DR CLARK, NO TRANSFER. PLAN FOR CONSERVATIVE TREATMENT AND TX BACK TO [facility]. PT/OT ORDERED. PIERRE, [facility] UPDATED.

## 2022-08-02 LAB
ALBUMIN SERPL-MCNC: 3.1 G/DL (ref 3.5–4.6)
ANION GAP SERPL CALCULATED.3IONS-SCNC: 11 MEQ/L (ref 9–15)
BUN BLDV-MCNC: 11 MG/DL (ref 8–23)
CALCIUM SERPL-MCNC: 8.9 MG/DL (ref 8.5–9.9)
CHLORIDE BLD-SCNC: 104 MEQ/L (ref 95–107)
CO2: 27 MEQ/L (ref 20–31)
CREAT SERPL-MCNC: 0.51 MG/DL (ref 0.5–0.9)
GFR AFRICAN AMERICAN: >60
GFR NON-AFRICAN AMERICAN: >60
GLUCOSE BLD-MCNC: 113 MG/DL (ref 70–99)
HCT VFR BLD CALC: 31.8 % (ref 37–47)
HEMOGLOBIN: 10.3 G/DL (ref 12–16)
INR BLD: 1.2
MAGNESIUM: 1.9 MG/DL (ref 1.7–2.4)
MCH RBC QN AUTO: 27.5 PG (ref 27–31.3)
MCHC RBC AUTO-ENTMCNC: 32.5 % (ref 33–37)
MCV RBC AUTO: 84.6 FL (ref 82–100)
PDW BLD-RTO: 15.4 % (ref 11.5–14.5)
PHOSPHORUS: 3.5 MG/DL (ref 2.3–4.8)
PLATELET # BLD: 258 K/UL (ref 130–400)
POTASSIUM SERPL-SCNC: 3.7 MEQ/L (ref 3.4–4.9)
PROTHROMBIN TIME: 14.8 SEC (ref 12.3–14.9)
RBC # BLD: 3.76 M/UL (ref 4.2–5.4)
SARS-COV-2, NAAT: NOT DETECTED
SODIUM BLD-SCNC: 142 MEQ/L (ref 135–144)
WBC # BLD: 7.6 K/UL (ref 4.8–10.8)

## 2022-08-02 PROCEDURE — 83735 ASSAY OF MAGNESIUM: CPT

## 2022-08-02 PROCEDURE — 2060000000 HC ICU INTERMEDIATE R&B

## 2022-08-02 PROCEDURE — 6370000000 HC RX 637 (ALT 250 FOR IP): Performed by: NURSE PRACTITIONER

## 2022-08-02 PROCEDURE — 6360000002 HC RX W HCPCS: Performed by: INTERNAL MEDICINE

## 2022-08-02 PROCEDURE — 2580000003 HC RX 258: Performed by: NURSE PRACTITIONER

## 2022-08-02 PROCEDURE — 85610 PROTHROMBIN TIME: CPT

## 2022-08-02 PROCEDURE — 2700000000 HC OXYGEN THERAPY PER DAY

## 2022-08-02 PROCEDURE — 80069 RENAL FUNCTION PANEL: CPT

## 2022-08-02 PROCEDURE — 85027 COMPLETE CBC AUTOMATED: CPT

## 2022-08-02 PROCEDURE — 97162 PT EVAL MOD COMPLEX 30 MIN: CPT

## 2022-08-02 PROCEDURE — 99233 SBSQ HOSP IP/OBS HIGH 50: CPT | Performed by: INTERNAL MEDICINE

## 2022-08-02 PROCEDURE — 97166 OT EVAL MOD COMPLEX 45 MIN: CPT

## 2022-08-02 PROCEDURE — 36415 COLL VENOUS BLD VENIPUNCTURE: CPT

## 2022-08-02 PROCEDURE — 99232 SBSQ HOSP IP/OBS MODERATE 35: CPT | Performed by: PHYSICAL MEDICINE & REHABILITATION

## 2022-08-02 PROCEDURE — 6370000000 HC RX 637 (ALT 250 FOR IP): Performed by: INTERNAL MEDICINE

## 2022-08-02 PROCEDURE — 6360000002 HC RX W HCPCS: Performed by: NURSE PRACTITIONER

## 2022-08-02 RX ORDER — ENOXAPARIN SODIUM 100 MG/ML
1 INJECTION SUBCUTANEOUS 2 TIMES DAILY
Status: DISCONTINUED | OUTPATIENT
Start: 2022-08-02 | End: 2022-08-03 | Stop reason: HOSPADM

## 2022-08-02 RX ORDER — ENOXAPARIN SODIUM 100 MG/ML
INJECTION SUBCUTANEOUS
Status: COMPLETED
Start: 2022-08-02 | End: 2022-08-03

## 2022-08-02 RX ORDER — WARFARIN SODIUM 5 MG/1
5 TABLET ORAL DAILY
Status: DISCONTINUED | OUTPATIENT
Start: 2022-08-02 | End: 2022-08-03

## 2022-08-02 RX ADMIN — MICONAZOLE NITRATE: 2 POWDER TOPICAL at 09:20

## 2022-08-02 RX ADMIN — CEFTRIAXONE SODIUM 1000 MG: 1 INJECTION, POWDER, FOR SOLUTION INTRAMUSCULAR; INTRAVENOUS at 15:00

## 2022-08-02 RX ADMIN — LACTOBACILLUS TAB 2 TABLET: TAB at 09:19

## 2022-08-02 RX ADMIN — CARVEDILOL 6.25 MG: 6.25 TABLET, FILM COATED ORAL at 09:18

## 2022-08-02 RX ADMIN — CARVEDILOL 6.25 MG: 6.25 TABLET, FILM COATED ORAL at 17:36

## 2022-08-02 RX ADMIN — PANTOPRAZOLE SODIUM 40 MG: 40 TABLET, DELAYED RELEASE ORAL at 05:56

## 2022-08-02 RX ADMIN — LEVOTHYROXINE SODIUM 88 MCG: 88 TABLET ORAL at 09:19

## 2022-08-02 RX ADMIN — ENOXAPARIN SODIUM 70 MG: 100 INJECTION SUBCUTANEOUS at 10:28

## 2022-08-02 RX ADMIN — CITALOPRAM HYDROBROMIDE 20 MG: 20 TABLET ORAL at 09:20

## 2022-08-02 RX ADMIN — MICONAZOLE NITRATE: 2 POWDER TOPICAL at 20:22

## 2022-08-02 RX ADMIN — LACTOBACILLUS TAB 2 TABLET: TAB at 15:59

## 2022-08-02 RX ADMIN — WARFARIN SODIUM 5 MG: 5 TABLET ORAL at 12:24

## 2022-08-02 RX ADMIN — ENOXAPARIN SODIUM 70 MG: 100 INJECTION SUBCUTANEOUS at 20:21

## 2022-08-02 RX ADMIN — LACTOBACILLUS TAB 2 TABLET: TAB at 20:21

## 2022-08-02 RX ADMIN — FERROUS SULFATE TAB 325 MG (65 MG ELEMENTAL FE) 325 MG: 325 (65 FE) TAB at 09:20

## 2022-08-02 RX ADMIN — SODIUM CHLORIDE, PRESERVATIVE FREE 10 ML: 5 INJECTION INTRAVENOUS at 20:22

## 2022-08-02 ASSESSMENT — ENCOUNTER SYMPTOMS
EYES NEGATIVE: 1
GASTROINTESTINAL NEGATIVE: 1
CHEST TIGHTNESS: 0
COUGH: 0
RESPIRATORY NEGATIVE: 1
WHEEZING: 0
SHORTNESS OF BREATH: 0
NAUSEA: 0
STRIDOR: 0
BLOOD IN STOOL: 0

## 2022-08-02 ASSESSMENT — PAIN SCALES - GENERAL
PAINLEVEL_OUTOF10: 0
PAINLEVEL_OUTOF10: 0

## 2022-08-02 NOTE — PROGRESS NOTES
Ascending aortic aneurysm ([facility] [address] [address] 6/23/2017    Charcot Arleen Tooth muscular atrophy     CHF (congestive heart failure) (HCC)     Chronic diastolic CHF (congestive heart failure) ([facility] [address] 4/1/2022    Depression     Descending aortic aneurysm ([facility] 6/23/2017    Essential hypertension 2/16/2018    Headache     HTN (hypertension)     Impaired mobility and activities of daily living     Lumbar stenosis with neurogenic claudication     Myelopathy ([facility] [address]     Osteoarthritis      Past Surgical History:   Procedure Laterality Date    JOINT REPLACEMENT Bilateral     knees    OTHER SURGICAL HISTORY Right 07/21/2022    cat scan guided abdominal mass aspiration with drain placement by Dr. Faby Guzman Left 02/25/2021    LEFT PLEURAL CATHETER INSERTION performed by Geraldine Reddy MD at [place] [address] Left 01/29/2021    total of 755 cc removed per Dr Rosaroi Thorne specimen sent to lab     Chart Reviewed: Yes  Patient assessed for rehabilitation services?: Yes  Family / Caregiver Present: Yes (dtr)  General Comment  Comments: Pt resting in bed, agreeable to PT eval with encouragement    Restrictions:  Restrictions/Precautions: Fall Risk  Position Activity Restriction  Other position/activity restrictions: Abdominal drain     SUBJECTIVE:   Subjective: Dtr at bedside upon during session. Pt reports abdominal pain, unable to rate. Family interprets d/t pt unable to use ipad traslator d/t hard of hearing and poor ability to community noted with previous attempts at video translating services    Pain   Pre treatment screening: unable to rate  Location: abdominal  []  Pt declined intervention  [x]  Pt able to proceed PT session  []  RN or physician notified  []  RN called to bedside to administer meds.   Post treatment screening unable to rate    Prior Level of Function:  Social/Functional History  Lives With: Alone  Type of Home: House  Home Layout: One level  Home Access: Stairs to enter with rails  Entrance Stairs - Number of Steps: 2  Entrance Stairs - Rails: Both  Bathroom Shower/Tub: Tub/Shower unit  Bathroom Equipment: Grab bars in shower  Home Equipment: Cane (B AFOs)  ADL Assistance: Independent  Homemaking Assistance: Independent  Homemaking Responsibilities: Yes  Meal Prep Responsibility: Primary  Laundry Responsibility: Primary  Cleaning Responsibility: Primary  Shopping Responsibility: No (Son performs)  Ambulation Assistance: Independent (furniture walking and cane prn)  Transfer Assistance: Independent  Active : No  Additional Comments: Social-functional information collected through chart review and dtr able to confirm. OBJECTIVE:   Vision  Vision Exceptions:  (Per chart review, has glasses but refuses to wear them)  Hearing: Exceptions to [facility]  Hearing Exceptions: Hard of hearing/hearing concerns; No hearing aid    Cognition:  Overall Orientation Status: Within Functional Limits  Orientation Level: Oriented to time, Oriented to situation, Oriented to person  Overall Cognitive Status: Exceptions  Cognition Comment: Pt with poor safety awareness, increased time and effort to understand d/t language barrier and [place] (pt unable to utilize audio  d/t hearing issues.)    Observation/Palpation  Posture: Fair (forwardhead, rounded shoulders, flexed forward posture)  Observation: Pt sleeping on therapist arrival; awakens easily and agreeable to eval    ROM:  AROM: Generally decreased, functional  PROM: Generally decreased, functional    Strength:  Strength: Generally decreased, functional (functionally >/=3+/5)    Neuro:  Balance  Sitting - Static: Good;-  Sitting - Dynamic: Good  Standing - Static: Fair (requires B UE support)  Standing - Dynamic: Fair;- (Min to Mod A with B UE support)                    Tone: Normal  Coordination: Generally decreased, functional    Sensation: Intact    Bed mobility  Rolling to Right: Minimal assistance  Supine to Sit: Minimal assistance  Sit to Supine: Unable to assess  Bed Mobility Comments: increased time and effort, bed flat, pulls to sit, poor carry over of previous ed of log roll to reduce abdominal pain and improve body mechanics    Transfers  Sit to Stand: Minimal Assistance  Stand to sit: Minimal Assistance  Bed to Chair: Minimal assistance    Ambulation  WB Status: wbat  Ambulation  Surface: carpet  Device: Hand-Held Assist  Other Apparatus: O2  Assistance: Moderate assistance  Gait Deviations: Slow [place];Decreased step length  Distance: 15ft  Comments: pt adamently refused to use 2ww           Activity Tolerance  Activity Tolerance: Patient tolerated treatment well      Patient Education  Education Given To: Patient  Education Provided: Plan of Care;Role of Therapy;Precautions  Education Provided Comments: log roll  Education Method: Verbal  Barriers to Learning: None  Education Outcome: Continued education needed     ASSESSMENT:   Body Structures, Functions, Activity Limitations Requiring Skilled Therapeutic Intervention: Decreased functional mobility ; Decreased strength;Decreased posture;Decreased balance;Decreased endurance;Decreased safe awareness  Decision Making: Medium Complexity  History: high  Exam: high  Clinical Presentation: med    Specific Instructions for Next Treatment: in room d/t diarrhea  Therapy Prognosis: Good  Barriers to Learning: language barrier, heard of hearing     DISCHARGE RECOMMENDATIONS:  Discharge Recommendations: Continue to assess pending progress  Assessment: Pt demonstrates the above deficits and decline in functional mobility status placing them at increased risk for falls. Pt would benefit from physical therapy to address above deficits and allow for safe return home at highest level of function, decrease risk for falls, and improve QOL.       PLAN OF CARE:  Plan  Plan: 1 time a day 3-6 times a week  Plan weeks: 3 weeks  Specific Instructions for Next Treatment: in room d/t diarrhea  Current Treatment Recommendations: Strengthening, ROM, Balance training, Functional mobility training, Transfer training, Endurance training, Gait training, Stair training, Cognitive reorientation, Patient/Caregiver education & training, Safety education & training, Equipment evaluation, education, & procurement, Therapeutic activities, Home exercise program, Neuromuscular re-education  Plan Comment: Family training scheduled for 7/28 with pt's son and daughter. Progress gait training to st cane and \"furniture surfing\" as able in protected area to simulate home enviroment. Safety Devices  Type of Devices: Chair alarm in place, Call light within reach, Left in chair    Goals:  [address] term goal 1: Bed mobility with indep  Long term goal 2: Functional transfers with indep  Long term goal 3: Amb 50ft with LRAD and indep  Long term goal 4: 2 steps with handrail with supervision to enter/exit home  Long term goal 5: 5xSTS <18 seconds to demonstrate decreased fall risk    AMPAC (6 CLICK) BASIC MOBILITY  AM-PAC Inpatient Mobility Raw Score : 15     Therapy Time:   Individual   Time In 1135   Time Out 1145   Minutes 10         [facility], [address] [place] [address], 08/02/22 at 2:42 PM       Definitions for assistance levels  Independent = pt does not require any physical supervision or assistance from another person for activity completion. Device may be needed.   Stand by assistance = pt requires verbal cues or instructions from another person, close to but not touching, to perform the activity  Minimal assistance= pt performs 75% or more of the activity; assistance is required to complete the activity  Moderate assistance= pt performs 50% of the activity; assistance is required to complete the activity  Maximal assistance = pt performs 25% of the activity; assistance is required to complete the activity  Dependent = pt requires total physical assistance to accomplish the task

## 2022-08-02 NOTE — PROGRESS NOTES
Attempted to see patient twice and her and family went for a walk. Third time family not at bedside. Spoke with son, Essence Hernandez, who is patient's HPOA. Will meet with patient and him tomorrow around 9 a.m.

## 2022-08-02 NOTE — PROGRESS NOTES
Pt up to chair multiple times throughout the day with daughter at bedside tolerated well. Pt to be returning to rehab here possibly tommorow.

## 2022-08-02 NOTE — ED NOTES
Pharmacy Routine Monitoring of Warfarin (INR)  Active order for anticoagulants/antiplatelets: Lovenox 48ZM SQ BID   Significant drug interactions: Lovenox   Goal INR: 2 - 3    Recent Labs     07/31/22  0516 08/01/22  1435 08/02/22  0527   HGB 10.1* 10.1* 10.3*   LABALBU  --  3.0* 3.1*     INR   Date Value Ref Range Status   08/02/2022 1.2  Final       Date INR Dose  8/2      1.2       5 mg        Recent warfarin administrations        No warfarin orders with administrations found. Orders not given:            warfarin (COUMADIN) tablet 5 mg                  Assessment/Plan:       INR orders are placed. Dosing strategy is reasonable. No obvious intervention needed. Remains on Lovenox bridging.      Estella HollandD, BCPS   8/2/2022 11:08 AM

## 2022-08-02 NOTE — FLOWSHEET NOTE
2315: Assumed care of pt; resting at present with no distress evident. 0200: APTT=87.2; no change to current rate per heparin protocol. 0600: Up to [facility], denies needs.      Electronically signed by Lalitha Olsen RN on 8/2/2022 at 6:33 AM

## 2022-08-02 NOTE — CARE COORDINATION
DC PLAN [facility] TODAY VS TOMORROW. SPOKE WITH PIERRE ON [facility] AND MESSAGE TO MD TO SEE IF PATIENT CAN TRANSFER TODAY.

## 2022-08-02 NOTE — PROGRESS NOTES
Subjective: The patient complains of severe acute on chronic progressive fatigue and abdominal pain    partially relieved by rest, PT, OT and meds -Norco and exacerbated by exertion and recent illness-Abd abscess-surgically placed drain which may be removed soon. 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 *      .    Reviewed recent nursing note and discussed current status and planned care with acute care providers, \" Assumed care of pt; resting at present with no distress evident. 0200: APTT=87.2; no change to current rate per heparin protocol. 0600: Up to [facility], denies needs. \". Patient and her daughter are highly motivated to getting her better with a conservative approach using PT OT and nutrition and are in agreement that she is too high risk for surgery at this time. ROS x10: The patient also complains of severely impaired mobility and activities of daily living. Otherwise no new problems with vision, hearing, nose, mouth, throat, dermal, cardiovascular, GI, , pulmonary, musculoskeletal, psychiatric or neurological.        Vital signs:  BP (!) 123/94   Pulse 74   Temp 97.4 °F (36.3 °C) (Oral)   Resp 20   Ht 5' (1.524 m)   Wt 164 lb (74.4 kg)   SpO2 95%   BMI 32.03 kg/m²   I/O:   PO/Intake:    fair PO intake, adult regular diet with thin liquids       Bowel/Bladder:   continent,    General:  Patient is well developed, adequately nourished, and    well kempt. HEENT:    PERRLA, hearing intact to loud voice, external inspection of ear and nose benign. Inspection of lips, tongue and gums benign  Musculoskeletal: No significant change in strength or tone. All joints stable. Inspection and palpation of digits and nails show no clubbing, cyanosis or inflammatory conditions. Neuro/Psychiatric: Affect: flat-  Alert and oriented to self and situation with no needed cues. No significant change in deep tendon reflexes or sensation  Lungs:  Diminished, CTA-B  . Respiration effort is normal at rest.   Heart:   S1 = S2,   RRR. Abdomen:  Soft, generalized-tenderness-drain site right lower quadrant  Extremities:  Trace  lower extremity edema but no unusual tenderness.   Skin:   BUE bruises dt blood draws, drain site right lower quadrant      Rehabilitation:  Physical Therapy:   Bed mobility:     Transfers:     Gait:      Stairs:     W/C mobility:       Occupational Therapy:                    Speech Therapy:            Diet/Swallow:                   COGNITION  OT:    SP:           Lab/X-ray studies reviewed, analyzed and discussed with patient and staff:   Recent Results (from the past 24 hour(s))   APTT    Collection Time: 08/01/22  2:35 PM   Result Value Ref Range    aPTT 62.1 (H) 24.4 - 36.8 sec   CBC    Collection Time: 08/01/22  2:35 PM   Result Value Ref Range    WBC 7.5 4.8 - 10.8 K/uL    RBC 3.76 (L) 4.20 - 5.40 M/uL    Hemoglobin 10.1 (L) 12.0 - 16.0 g/dL    Hematocrit 31.8 (L) 37.0 - 47.0 %    MCV 84.5 82.0 - 100.0 fL    MCH 27.0 27.0 - 31.3 pg    MCHC 31.9 (L) 33.0 - 37.0 %    RDW 15.4 (H) 11.5 - 14.5 %    Platelets 719 239 - 438 K/uL   Renal Function Panel    Collection Time: 08/01/22  2:35 PM   Result Value Ref Range    Sodium 140 135 - 144 mEq/L    Potassium 3.6 3.4 - 4.9 mEq/L    Chloride 102 95 - 107 mEq/L    CO2 27 20 - 31 mEq/L    Anion Gap 11 9 - 15 mEq/L    Glucose 130 (H) 70 - 99 mg/dL    BUN 11 8 - 23 mg/dL    Creatinine 0.60 0.50 - 0.90 mg/dL    GFR Non-African American >60.0 >60    GFR  >60.0 >60    Calcium 9.3 8.5 - 9.9 mg/dL    Phosphorus 3.1 2.3 - 4.8 mg/dL    Albumin 3.0 (L) 3.5 - 4.6 g/dL   Magnesium    Collection Time: 08/01/22  2:35 PM   Result Value Ref Range    Magnesium 1.9 1.7 - 2.4 mg/dL   High sensitivity CRP    Collection Time: 08/01/22  2:35 PM   Result Value Ref Range    CRP High Sensitivity 45.6 (H) 0.0 - 5.0 mg/L   Procalcitonin    Collection Time: 08/01/22  2:35 PM   Result Value Ref Range    Procalcitonin 0.05 0.00 - 0.15 ng/mL   APTT    Collection Time: 08/01/22 11:15 PM   Result Value Ref Range    aPTT 87.2 (H) 24.4 - 36.8 sec   CBC    Collection Time: 08/02/22  5:27 AM   Result Value Ref Range    WBC 7.6 4.8 - 10.8 K/uL    RBC 3.76 (L) 4.20 - 5.40 M/uL    Hemoglobin 10.3 (L) 12.0 - 16.0 g/dL    Hematocrit 31.8 (L) 37.0 - 47.0 %    MCV 84.6 82.0 - 100.0 fL    MCH 27.5 27.0 - 31.3 pg    MCHC 32.5 (L) 33.0 - 37.0 %    RDW 15.4 (H) 11.5 - 14.5 %    Platelets 433 278 - 346 K/uL   Renal Function Panel    Collection Time: 08/02/22  5:27 AM   Result Value Ref Range    Sodium 142 135 - 144 mEq/L    Potassium 3.7 3.4 - 4.9 mEq/L    Chloride 104 95 - 107 mEq/L    CO2 27 20 - 31 mEq/L    Anion Gap 11 9 - 15 mEq/L    Glucose 113 (H) 70 - 99 mg/dL    BUN 11 8 - 23 mg/dL    Creatinine 0.51 0.50 - 0.90 mg/dL    GFR Non-African American >60.0 >60    GFR  >60.0 >60    Calcium 8.9 8.5 - 9.9 mg/dL    Phosphorus 3.5 2.3 - 4.8 mg/dL    Albumin 3.1 (L) 3.5 - 4.6 g/dL   Magnesium    Collection Time: 08/02/22  5:27 AM   Result Value Ref Range    Magnesium 1.9 1.7 - 2.4 mg/dL   Protime-INR    Collection Time: 08/02/22 10:22 AM   Result Value Ref Range    Protime 14.8 12.3 - 14.9 sec    INR 1.2      Previous extensive, complex labs, notes and diagnostics reviewed and analyzed.      ALLERGIES:    Allergies as of 07/29/2022 - Fully Reviewed 07/29/2022   Allergen Reaction Noted    Latex  07/19/2017    Tape [adhesive tape]  06/28/2016      (please also verify by checking [facility])     Complex Physical Medicine & Rehab Issues Assess & Plan:   Severe abnormality of gait and mobility and impaired self-care and ADL's secondary to   Charcot-Arleen-Tooth neuropathy. Updated functional and medical status reassessed regarding patients ability to participate in therapies and patient found to be able to participate      acute intensive comprehensive inpatient rehabilitation program including PT/OT to improve balance, ambulation, ADLs, and to improve the P/AROM. It is my opinion that they will be able to tolerate 3 hours of therapy a day and benefit from it at an acute level. I again discussed acute rehab with the patient and verify that the patient is able and willing to participate in 3 hours of therapy a day. Rehab and Acute Care Case Management has also reinforced this expectation. Will continue to follow to attempt to get patient to the most efficient but most effective level of care will be in their best interest.  Continue to focus on energy conservation heart rate and blood pressure monitoring before during and after therapy endurance and consistency of function. Bowel constipation and Bladder dysfunction   overactive, neurogenic bladder:  frequent toileting, ambulate to bathroom with assistance, check post void residuals. Check for C.difficile x1 if >2 loose stools in 24 hours, continue bowel & bladder program.  Monitor for UTI symptoms including lethargy and confusion    Severe abscess related abdominal pain and generalized OA pain: reassess pain every shift and prior to and after each therapy session, give prn Tylenol Norco and consider scheduled Tylenol, modalities prn in therapy, consider Lidoderm, K-pad prn. Skin healing abdominal drain site breakdown   risk:  continue pressure relief program.  Daily skin exams and reports from nursing.     Severe fatigue due to immobility and nutritional deficits: monitoring for dysphagia   Add vitamin B12 vitamin D and CoQ10 titrate dosing and add protein supplementation with low carb content. Complex discharge planning:   Discussed with care team-last 24 hour events noted. I will continue to follow along and reassess functional and medical status as we strive to improve patient's functional and medical outcomes progressing to the most efficient and lowest level of care. Complex Active General Medical Issues that complicate care:     1. 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 *          Events and functional changes in the past 24 hours reviewed improvements in functional status are encouraging       Focus of today's plan-she is stabilizing medically may get her drain removed soon and were hoping to transition back to acute rehab where she was doing well.       Lalitha Webster D.O., PM&R     Attending    [address]

## 2022-08-02 NOTE — CARE COORDINATION
Inpatient Rehab referral received. Met with patient and daughter regarding return to Acute Rehab, both are hopeful for transfer soon. 1430-Case reviewed with PM&R Dr Elham Saunders and patient ok to transfer to Rehab room 250 once medically cleared and covid negative. 1515-Call from [address] [place] [address], Attending has cleared patient but Cardiology is holding transfer for further monitoring overnight. Will follow.  Electronically signed by Darius Baltazar RN on 8/2/22 at 4:53 PM EDT

## 2022-08-02 NOTE — PROGRESS NOTES
Progress Note  Patient: Talya Fontana  Unit/Bed: L443/S309-57  YOB: 1932  MRN: [id number]  Acct: [de-identified]   Admitting Diagnosis: Abscess [L02.91]  Admit Date:  7/29/2022  Hospital Day: 4    Chief Complaint: PAF    Histories:  Past Medical History:   Diagnosis Date    Ascending aortic aneurysm ([facility] 6/23/2017    Charcot Arleen Tooth muscular atrophy     CHF (congestive heart failure) (HCC)     Chronic diastolic CHF (congestive heart failure) ([facility] 4/1/2022    Depression     Descending aortic aneurysm ([facility] 6/23/2017    Essential hypertension 2/16/2018    Headache     HTN (hypertension)     Impaired mobility and activities of daily living     Lumbar stenosis with neurogenic claudication     Myelopathy ([facility]     Osteoarthritis      Past Surgical History:   Procedure Laterality Date    JOINT REPLACEMENT Bilateral     knees    OTHER SURGICAL HISTORY Right 07/21/2022    cat scan guided abdominal mass aspiration with drain placement by Dr. Natalie Parker Left 02/25/2021    LEFT PLEURAL CATHETER INSERTION performed by Stephania Wolfe MD at [place] [address] Left 01/29/2021    total of 755 cc removed per Dr Carloz Garg specimen sent to lab     Family History   Problem Relation Age of Onset    Arthritis Mother     Arthritis Father     High Blood Pressure Father      Social History     Socioeconomic History    Marital status:       Spouse name: None    Number of children: 2    Years of education: None    Highest education level: None   Tobacco Use    Smoking status: Never    Smokeless tobacco: Never   Substance and Sexual Activity    Alcohol use: No     Alcohol/week: 0.0 standard drinks    Drug use: No   Social History Narrative    , Lives With: Alone, son lives down the street, dtr is in the area    Type of Home: [facility]  in [address]: One level    Home Access: Stairs to enter with rails- Number of Steps: 2- Rails: Both    Bathroom Shower/Tub: Tub/Shower unit, Bathroom Equipment: Grab bars in shower, Shower chair    Home Equipment: Rolling walker, Cane(Pt infrequemtly uses DME for ambulation and prefers to furniture walk in home)    ADL Assistance: Independent, [address] Responsibilities: Yes    Ambulation Assistance: Independent, Transfer Assistance: Independent    Additional Comments: Son stops over 2 times daily           Subjective/HPI   no acute events. Denies cp nor so. Lying flat comfortable. Minimal abd discomfort. Daughter in room. EKG:  pt refusing Monitor        Review of Systems:   Review of Systems   Constitutional: Negative. Negative for diaphoresis and fatigue. HENT: Negative. Eyes: Negative. Respiratory: Negative. Negative for cough, chest tightness, shortness of breath, wheezing and stridor. Cardiovascular: Negative. Negative for chest pain, palpitations and leg swelling. Gastrointestinal: Negative. Negative for blood in stool and nausea. Genitourinary: Negative. Musculoskeletal: Negative. Skin: Negative. Neurological:  Positive for dizziness. Negative for syncope, weakness and light-headedness. Hematological: Negative. Psychiatric/Behavioral: Negative. Physical Examination:    BP (!) 149/84   Pulse 66   Temp 98.6 °F (37 °C) (Oral)   Resp 18   Ht 5' (1.524 m)   Wt 164 lb (74.4 kg)   SpO2 98%   BMI 32.03 kg/m²    Physical Exam   Constitutional: She appears healthy. No distress. HENT:   Normal cephalic and Atraumatic   Eyes: Pupils are equal, round, and reactive to light. Neck: Thyroid normal. No JVD present. No neck adenopathy. No thyromegaly present. Cardiovascular: Normal rate, normal heart sounds, intact distal pulses and normal pulses. An irregularly irregular rhythm present. Pulmonary/Chest: Effort normal and breath sounds normal. She has no wheezes. She has no rales. She exhibits no tenderness. Abdominal: Soft.  Bowel sounds are normal. There is no abdominal tenderness. Musculoskeletal:         General: No tenderness or edema. Normal range of motion. Cervical back: Normal range of motion and neck supple. Neurological: She is alert and oriented to person, place, and time. Skin: Skin is warm. No cyanosis. Nails show no clubbing.      LABS:  CBC:   Lab Results   Component Value Date/Time    WBC 7.6 08/02/2022 05:27 AM    RBC 3.76 08/02/2022 05:27 AM    HGB 10.3 08/02/2022 05:27 AM    HCT 31.8 08/02/2022 05:27 AM    MCV 84.6 08/02/2022 05:27 AM    MCH 27.5 08/02/2022 05:27 AM    MCHC 32.5 08/02/2022 05:27 AM    RDW 15.4 08/02/2022 05:27 AM     08/02/2022 05:27 AM     CBC with Differential:    Lab Results   Component Value Date/Time    WBC 7.6 08/02/2022 05:27 AM    RBC 3.76 08/02/2022 05:27 AM    HGB 10.3 08/02/2022 05:27 AM    HCT 31.8 08/02/2022 05:27 AM     08/02/2022 05:27 AM    MCV 84.6 08/02/2022 05:27 AM    MCH 27.5 08/02/2022 05:27 AM    MCHC 32.5 08/02/2022 05:27 AM    RDW 15.4 08/02/2022 05:27 AM    BANDSPCT 2 07/24/2022 05:00 AM    METASPCT 2 07/24/2022 05:00 AM    LYMPHOPCT 7.0 07/24/2022 05:00 AM    MONOPCT 5.8 07/24/2022 05:00 AM    BASOPCT 1.0 07/24/2022 05:00 AM    MONOSABS 0.7 07/24/2022 05:00 AM    LYMPHSABS 0.8 07/24/2022 05:00 AM    EOSABS 0.0 07/24/2022 05:00 AM    BASOSABS 0.1 07/24/2022 05:00 AM     CMP:    Lab Results   Component Value Date/Time     08/02/2022 05:27 AM    K 3.7 08/02/2022 05:27 AM    K 3.2 07/24/2022 05:00 AM     08/02/2022 05:27 AM    CO2 27 08/02/2022 05:27 AM    BUN 11 08/02/2022 05:27 AM    CREATININE 0.51 08/02/2022 05:27 AM    GFRAA >60.0 08/02/2022 05:27 AM    LABGLOM >60.0 08/02/2022 05:27 AM    GLUCOSE 113 08/02/2022 05:27 AM    PROT 6.0 07/24/2022 05:00 AM    LABALBU 3.1 08/02/2022 05:27 AM    CALCIUM 8.9 08/02/2022 05:27 AM    BILITOT 0.3 07/24/2022 05:00 AM    ALKPHOS 51 07/24/2022 05:00 AM    AST 20 07/24/2022 05:00 AM    ALT 19 07/24/2022 05:00 AM BMP:    Lab Results   Component Value Date/Time     08/02/2022 05:27 AM    K 3.7 08/02/2022 05:27 AM    K 3.2 07/24/2022 05:00 AM     08/02/2022 05:27 AM    CO2 27 08/02/2022 05:27 AM    BUN 11 08/02/2022 05:27 AM    LABALBU 3.1 08/02/2022 05:27 AM    CREATININE 0.51 08/02/2022 05:27 AM    CALCIUM 8.9 08/02/2022 05:27 AM    GFRAA >60.0 08/02/2022 05:27 AM    LABGLOM >60.0 08/02/2022 05:27 AM    GLUCOSE 113 08/02/2022 05:27 AM     Magnesium:    Lab Results   Component Value Date/Time    MG 1.9 08/02/2022 05:27 AM     Troponin:    Lab Results   Component Value Date/Time    TROPONINI <0.010 07/18/2022 12:30 PM        Active Hospital Problems    Diagnosis Date Noted    A-fib 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 [address] [I50.43] 01/25/2021     Priority: Low    Descending aortic aneurysm ([facility] [address] [I71.9] 06/23/2017     Priority: Low    Thoracic aortic aneurysm without rupture ([facility] [address] [I71.2] 06/23/2017     Priority: Low        Assessment/Plan:  Abd Abscess - s/p Drainage   LVEF 60  Frequent PAF - rate is controlled on exam.   CAD- moderate - no angina. 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 Xarelto stopped and started Heparin. Had long discussion with pt and daughter. They no longer want aggressive Rx and does not want to be transferred to [facility]. We will transition to Lovenox from Heparin gtt and start Warfarin.           Electronically signed by Abigail Hardin MD on 8/2/2022 at 9:20

## 2022-08-02 NOTE — PROGRESS NOTES
Hospitalist Progress Note      PCP: Jus Arzate MD    Date of Admission: 7/29/2022    Chief Complaint:  no acute events, afebrile, stable HD    Medications:  Reviewed    Infusion Medications    sodium chloride       Scheduled Medications    enoxaparin  1 mg/kg SubCUTAneous BID    warfarin  5 mg Oral Daily    enoxaparin        carvedilol  6.25 mg Oral BID WC    cefTRIAXone (ROCEPHIN) IV  1,000 mg IntraVENous Q24H    citalopram  20 mg Oral Daily    ferrous sulfate  325 mg Oral Every Other Day    lactobacillus acidophilus  2 tablet Oral TID    levothyroxine  88 mcg Oral Daily    miconazole   Topical BID    pantoprazole  40 mg Oral QAM AC    sodium chloride flush  5-40 mL IntraVENous 2 times per day     PRN Meds: sodium chloride, acetaminophen, HYDROcodone 5 mg - acetaminophen, loperamide, ondansetron **OR** ondansetron, polyethylene glycol, potassium chloride, sodium chloride flush      Intake/Output Summary (Last 24 hours) at 8/2/2022 1101  Last data filed at 8/1/2022 1217  Gross per 24 hour   Intake 240 ml   Output --   Net 240 ml         Exam:    BP (!) 147/79   Pulse 67   Temp 98.6 °F (37 °C) (Oral)   Resp 18   Ht 5' (1.524 m)   Wt 164 lb (74.4 kg)   SpO2 98%   BMI 32.03 kg/m²     General appearance: appears stated age and cooperative. Respiratory:  clear to auscultation, bilaterally  Cardiovascular: Regular rate and rhythm, S1/S2  Abdomen: Soft, active bowel sounds. Musculoskeletal: No edema bilaterally. Labs:   Recent Labs     07/31/22  0516 08/01/22  1435 08/02/22  0527   WBC 7.7 7.5 7.6   HGB 10.1* 10.1* 10.3*   HCT 30.2* 31.8* 31.8*    280 258       Recent Labs     08/01/22  1435 08/02/22  0527    142   K 3.6 3.7    104   CO2 27 27   BUN 11 11   CREATININE 0.60 0.51   CALCIUM 9.3 8.9   PHOS 3.1 3.5     No results for input(s): AST, ALT, BILIDIR, BILITOT, ALKPHOS in the last 72 hours. No results for input(s): INR in the last 72 hours.   No results for input(s): CKTOTAL,

## 2022-08-02 NOTE — CARE COORDINATION
[facility] Pre-Admission Screening Document      Patient Name: Talya Fontana       MRN: [id number]    : 1932    Age: 80 y.o. Gender: female   Payor: Payor: MEDICARE / Plan: MEDICARE PART A AND B / Product Type: *No Product type* /   MSSP: No    Admitted from: [facility] Floor: 1W  Attending Care Provider: Kyle Middleton MD  Inpatient Rehab Referring Care Provider: Kyle Middleton MD  Primary Care Provider: Solomon Monaco MD  Inpatient Treatment Team including Consults: Treatment Team: Attending Provider: Kyle Middleton MD; Consulting Physician: Ruba Jane MD; Consulting Physician: Tommy White MD; Consulting Physician: Michelle Shi MD; Registered Nurse: Dionne Montana RN; Registered Nurse: Amy Hidalgo RN; : Jazlyn Hagan RN; : Thierry Martinez RN; Utilization Reviewer: Kendall Antoine RN    Reason for Hospitalization: Aortic aneurysm with new mural thrombus. RLQ mass/abscess. Anemia  No diagnosis found. No chief complaint on file. Isolation:No active isolations    Hospital Course:  Admit Date: 2022  3:51 PM  Inpatient Rehab Referral Date: 22  Narrative of hospital course/history of present illness: Patient was at [address] [place] [address] - d/t [facility] s/p hospitalization for cystic mass with abdominal drain placed. Transferred to [facility] for IV Heparin and possible [facility] transfer d/t large mural thrombus and ascending aortic aneurysm found on CT while off Xarelto. Cardiology: Ascending AO aneurysm. Patient and family request no aggressive treatment. Transition to Lovenox from Heparin gtt and start Warfarin. Hematology/Oncology: Abdominal mass and aortic thrombus. Do not feel she is a candidate for OR, continue IV Heparin or Lovenox, probably ok to remove abdominal drain. Repeat CT in about a week and then change back to Xarelto if improved.         Medical & Surgical History/Current Comorbidities:  Past Medical History:   Diagnosis Date    Ascending aortic aneurysm ([facility] 6/23/2017    Charcot Arleen Tooth muscular atrophy     CHF (congestive heart failure) (HCC)     Chronic diastolic CHF (congestive heart failure) ([facility] 4/1/2022    Depression     Descending aortic aneurysm ([facility] 6/23/2017    Essential hypertension 2/16/2018    Headache     HTN (hypertension)     Impaired mobility and activities of daily living     Lumbar stenosis with neurogenic claudication     Myelopathy ([facility]     Osteoarthritis      Past Surgical History:   Procedure Laterality Date    JOINT REPLACEMENT Bilateral     knees    OTHER SURGICAL HISTORY Right 07/21/2022    cat scan guided abdominal mass aspiration with drain placement by Dr. Grace Schmidt Left 02/25/2021    LEFT PLEURAL CATHETER INSERTION performed by Lynda Starkey MD at [place] [address] Left 01/29/2021    total of 755 cc removed per Dr Mikayla Garcia specimen sent to lab       Advanced Directives:  Advance Directives       Power of [address] Will ACP-Advance Directive ACP-Power of     Not on File Not on File Not on File Not on File            Labs/Infection Control:  Recent Labs     07/31/22  0516 08/01/22  1435 08/02/22  0527 08/02/22  1022   WBC 7.7 7.5 7.6  --    HGB 10.1* 10.1* 10.3*  --    HCT 30.2* 31.8* 31.8*  --     280 258  --    BUN  --  11 11  --    CREATININE  --  0.60 0.51  --    GLUCOSE  --  130* 113*  --    NA  --  140 142  --    K  --  3.6 3.7  --    INR  --   --   --  1.2   CALCIUM  --  9.3 8.9  --       Blood cultures:  No results for input(s): BC in the last 72 hours. Urinalysis/C&S:  No results for input(s): NITRITE, LABCAST, WBCUA, RBCUA, MUCUS, TRICHOMONAS, YEAST, BACTERIA, LEUKOCYTESUR, BLOODU, GLUCOSEU, KETUA, AMORPHOUS, LABURIN in the last 72 hours. Radiology:  CT ABDOMEN PELVIS W WO CONTRAST   Result Date: 7/29/2022  1. The previously placed abscess drain in the right lower quadrant appears to be in the periphery of the abscess with loculations now more apparent in the fluid collection. The fluid collection appears to be grossly the same size as prior to drainage. 2.New mural thrombus is noted in the distal aortic arch extending to the lower thoracic aorta which was not seen on prior study. Findings discussed with Dr. Evangelina Zaldivar 3. Note is again made of an aortic aneurysm involving the ascending aorta, aortic arch, and descending aorta. Based on current images, the ascending aorta appears to be 2 mm larger when compared to study dating February 17, 2021. The ascending aorta now measures 5.5 cm. Previously the arch measured 5.3 cm. 4.Note is again made of left lower small pleural effusion with bilateral basilar opacities which may represent atelectasis. 5.Note is again made of a large cystic mass in the pelvis. Medications/IV's:  The patient is currently on coumadin and lovenox for DVT prophylaxis. Scheduled:    enoxaparin, 1 mg/kg, SubCUTAneous, BID    warfarin, 5 mg, Oral, Daily    enoxaparin, , ,     carvedilol, 6.25 mg, Oral, BID WC    cefTRIAXone (ROCEPHIN) IV, 1,000 mg, IntraVENous, Q24H    citalopram, 20 mg, Oral, Daily    ferrous sulfate, 325 mg, Oral, Every Other Day    lactobacillus acidophilus, 2 tablet, Oral, TID    levothyroxine, 88 mcg, Oral, Daily    miconazole, , Topical, BID    pantoprazole, 40 mg, Oral, QAM AC    sodium chloride flush, 5-40 mL, IntraVENous, 2 times per day    PRN:  sodium chloride, acetaminophen, HYDROcodone 5 mg - acetaminophen, loperamide, ondansetron **OR** ondansetron, polyethylene glycol, potassium chloride, sodium chloride flush    Allergies:   Allergies   Allergen Reactions    Latex     Tape [place] Tape]          Most Recent Vitals, Height and Weight  BP (!) 123/94   Pulse 74   Temp 97.4 °F (36.3 °C) (Oral)   Resp 20   Ht 5' (1.524 m)   Wt 164 lb (74.4 kg)   SpO2 95%   BMI 32.03 kg/m²     Weight Bearing Restrictions: wbat    Current Diet Order: ADULT DIET; Regular    Skin: RUQ drain placed 7/21/22  Wound Care Documentation:          Lungs:   Respiratory  Respiratory Pattern: Regular  Respiratory Depth: Normal  Respiratory Quality/Effort: Unlabored  Chest Assessment: Chest expansion symmetrical  L Breath Sounds: Diminished  R Breath Sounds: Diminished  Level of Activity/Mobility: Up with assist      Cognition and Behavior:  Language Preference (if other than English):      Alertness/Behavior  Neuro (WDL): Within Defined Limits  Level of Consciousness: Alert (0)  History of Falling: No Cognition Comment: Pt with poor safety awareness, increased time and effort to understand d/t language barrier and [place] (pt unable to utilize audio  d/t hearing issues.)    Short Term Memory Deficits     History of Falling: No    Safety          Prior Level of Function and Living Arrangements:  Social/Functional History  Lives With: Alone  Type of Home: House  Home Layout: One level  Home Access: Stairs to enter with rails  Entrance Stairs - Number of Steps: 2  Entrance Stairs - Rails: Both  Bathroom Shower/Tub: Tub/Shower unit  Bathroom Equipment: Grab bars in shower  Home Equipment: Cane (B AFOs)  ADL Assistance: Independent  Homemaking Assistance: Independent  Homemaking Responsibilities: Yes  Meal Prep Responsibility: Primary  Laundry Responsibility: Primary  Cleaning Responsibility: Primary  Shopping Responsibility: No (Son performs)  Ambulation Assistance: Independent (furniture walking and cane prn)  Transfer Assistance: Independent  Active : No  Additional Comments: Social-functional information collected through chart review and dtr able to confirm.   Living Arrangements: Alone  Support Systems: Children, Family Members  Type of Home Care Services: None  Dental Appliances: None  Vision - Corrective Lenses: None  Hearing Aid: None  Personal Equipment:   Dental Appliances: None  Vision - Corrective Lenses: None  Hearing Aid: None      CURRENT FUNCTIONAL LEVEL:  Physical Therapy  Bed mobility:  Bed mobility  Rolling to Right: Minimal assistance (08/02/22 1423)  Supine to Sit: Minimal assistance (08/02/22 1423)  Sit to Supine: Unable to assess (08/02/22 1423)  Bed Mobility Comments: increased time and effort, bed flat, pulls to sit, poor carry over of previous ed of log roll to reduce abdominal pain and improve body mechanics (08/02/22 1423)  Transfers:  Transfers  Sit to Stand: Minimal Assistance (08/02/22 1424)  Stand to sit: Minimal Assistance (08/02/22 1424)  Bed to Chair: Minimal assistance (08/02/22 1424)  Gait:   Ambulation  WB Status: wbat (08/02/22 1434)  Ambulation  Surface: carpet (08/02/22 1434)  Device: Hand-Held Assist (08/02/22 1434)  Other Apparatus: O2 (08/02/22 1434)  Assistance: Moderate assistance (08/02/22 1434)  Gait Deviations: Slow [place];Decreased step length (08/02/22 1434)  Distance: 15ft (08/02/22 1434)  Comments: pt adamently refused to use 2ww (08/02/22 1434)  Stairs:     W/C mobility:         Occupational Therapy  Hand Dominance: Right  ADL  Feeding: Setup (08/02/22 1216)  Grooming: Setup (08/02/22 1216)  UE Bathing: Setup (08/02/22 1216)  LE Bathing: Moderate assistance (08/02/22 1216)  UE Dressing: Setup (08/02/22 1216)  LE Dressing: Maximum assistance (08/02/22 1216)  Toileting: Maximum assistance (08/02/22 1216)  Additional Comments: All ADLs simulated as above. Pt limited in her ability to sequence and does not attempt to perform her own toileting hygiene despite encouragement.  (08/02/22 1216)  Toilet Transfers  Toilet - Technique: Stand step (08/02/22 1223)  Equipment Used: Standard bedside commode (08/02/22 1223)  Toilet Transfer: Contact guard assistance (08/02/22 1223)  Toilet Transfers Comments: Poor safety awareness (08/02/22 1223)            Speech Language Pathology n/a      Current Conditions Requiring Inpatient Rehabilitation  Bowel/Bladder Dysfunction: Yes  Intervention Required = Frequent toileting, Briefs, and incontinence care  Risk for Medical/Clinical Complications = high  Skin Healing/Breakdown Risk: Yes  Intervention Required = Side to side turns  Risk for Medical/Clinical Complications = moderate  Nutrition/Hydration Deficiency: Yes  Intervention Required = Monitor I&Os and Check Labs  Risk for Medical/Clinical Complications = moderate  Medical Comorbidities: Yes  Intervention Required = DVT risk and CHF, AFib  Risk for Medical/Clinical Complications = high    Rehab/Skilled Needs:   3 hours of Intensive Acute Rehab therapy daily, 5 days/week for a total of 900 minutes  PT Treatment Time:  1.5 hrs/day  OT Treatment Time: 1.5 hrs/day  Rehabilitation Nursing   Case management/Social work  Wound Care  Oxygen/CPAP/BiPAP  PICC/IV Medications    Cultural needs:   Values / Beliefs  Do You Have Any Ethnic, Cultural, Sacramental, or Spiritual [place] Needs You Would Like Us To Be Aware of While You Are in the Hospital : No   Funding needs:     none    Expected Level of Improvement with Rehab  Assist for ADL Modified [place]  Assist for Transfers Modified [place]  Assist for Gait Modified [place]    Patient's willingness to participate: Yes  Patient's ability to tolerate proposed care: Yes  Patient/Family Goals of Rehab (in patient's/family's own words): return home    Anticipated Discharge Plan:  Home with   [address] [place] [address] Puneet De Gaulle, RN PT OT Aide Social Work to be determined      Barriers to Discharge:  Home entry accessibility  Equipment needs  [address] transportation  Resource availability      Rehab evaluation plan: Recommend Acute Rehab  Rehabilitation Impairment Group Code: 3.3  Rehab Impairment Group: Neurologic: Neuropathy  Estimated Length of Stay (days): 12  Rehab Diagnosis: Impaired mobility and ADL's due to Charcot Arleen tooth neuropathy  Reviewer's Signature: Electronically signed by Ash Lee RN on 8/2/22 at 2:47 PM EDT      I have reviewed and concur with the above Preadmission Screening.    Rehab Admitting Doctor: Dr. Gaby Graves DO

## 2022-08-02 NOTE — PROGRESS NOTES
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     Aortic thrombus ([address] 2022    Abscess 2022    Hx of drainage of abscess 2022    Generalized abdominal pain 2022    Streptococcus viridans infection 2022    Abdominal mass 2022    Right lower quadrant abdominal pain 2022    Hyponatremia 2022    Hypokalemia 2022    Anemia 2022    CHF (congestive heart failure) ([address] 2022    Hypothyroid 2022    Central retinal vein occlusion, left eye, stable 2021    Lumbar stenosis with neurogenic claudication 2016    Chronic diastolic CHF (congestive heart failure) ([address] 2022    Acute cystitis with hematuria 2021    Impaired mobility 2021    Acute on chronic combined systolic (congestive) and diastolic (congestive) heart failure ([address] 2021    Pleural effusion 2021    Hypoxia     Acute pulmonary edema (HCC)     Gait abnormality 2021    Acute on chronic combined systolic and diastolic CHF (congestive heart failure) ([address] 2021    Essential hypertension 2018    Shortness of breath     Dizziness     Thoracic aortic aneurysm without rupture ([address] 2017    Descending aortic aneurysm ([address] 2017    Osteoarthritis     Myelopathy ([address]     Headache     Depression     Acquired scoliosis 2016    Osteoporosis 2016    Charcot Arleen Tooth muscular atrophy 2016    Excess weight 2016    Osteoarthritis of lumbar spine with myelopathy 2016        Past Medical History:   Diagnosis Date    Ascending aortic aneurysm ([address] 2017    Charcot [place] Tooth muscular atrophy     CHF (congestive heart failure) (HCC)     Chronic diastolic CHF (congestive heart failure) ([facility] 4/1/2022    Depression     Descending aortic aneurysm ([facility] 6/23/2017    Essential hypertension 2/16/2018    Headache     HTN (hypertension)     Impaired mobility and activities of daily living     Lumbar stenosis with neurogenic claudication     Myelopathy ([facility]     Osteoarthritis      Past Surgical History:   Procedure Laterality Date    JOINT REPLACEMENT Bilateral     knees    OTHER SURGICAL HISTORY Right 07/21/2022    cat scan guided abdominal mass aspiration with drain placement by Dr. Alec Mcpherson Left 02/25/2021    LEFT PLEURAL CATHETER INSERTION performed by Kirt Felipe MD at [place] [id number] Left 01/29/2021    total of 755 cc removed per Dr Palafox Nails specimen sent to lab        Restrictions  Restrictions/Precautions: Fall Risk  Required Braces or Orthoses?: No         Other position/activity restrictions: Abdominal drain    Safety Devices: Safety Devices  Type of Devices: All fall risk precautions in place;Call light within reach; Chair alarm in place; Left in chair     Patient's date of birth confirmed: Yes    General:  Chart Reviewed: Yes  Patient assessed for rehabilitation services?: Yes    Subjective  Subjective: \"I have to pee\"       Pain at start of treatment: No    Pain at end of treatment: No    Location:   Nursing notified: Not Applicable  RN:   Intervention: None    Prior Level of Function:  Social/Functional History  Lives With: Alone  Type of Home: House  Home Layout: One level  Home Access: Stairs to enter with rails  Entrance Stairs - Number of Steps: 2  Entrance Stairs - Rails: Both  Bathroom Shower/Tub: Tub/Shower unit  Bathroom Equipment: Grab bars in shower  Home Equipment: Cane (B AFOs)  ADL Assistance: [address] [place] [address] Avenue: Independent  Homemaking Responsibilities: Yes  Meal Prep Responsibility: Primary  Laundry Responsibility: LUE AROM (degrees)  LUE AROM : WFL  Left Hand AROM (degrees)  Left Hand AROM: WFL  RUE AROM (degrees)  RUE AROM : WFL  Right Hand AROM (degrees)  Right Hand AROM: WFL    UE STRENGTH:  Strength: Generally decreased, functional    UE COORDINATION:  Coordination: Generally decreased, functional    UE TONE:  Tone: Normal    UE SENSATION:  Sensation: Impaired (Neuropathy in B feet)    Hand Dominance:  Hand Dominance  Hand Dominance: Right    ADL Status:  ADL  Feeding: Setup  Grooming: Setup  UE Bathing: Setup  LE Bathing: Moderate assistance  UE Dressing: Setup  LE Dressing: Maximum assistance  Toileting: Maximum assistance  Additional Comments: All ADLs simulated as above. Pt limited in her ability to sequence and does not attempt to perform her own toileting hygiene despite encouragement. Toilet Transfers  Toilet - Technique: Stand step  Equipment Used: Standard bedside commode  Toilet Transfer: Contact guard assistance  Toilet Transfers Comments: Poor safety awareness    Functional Mobility:  Patient ambulated to bedside chair with Handheld assist at Mod A level. HHA + 2 people. Refuses to utilize [employer]. Increased time and effort for ambulation. Decreased safety awareness with mobility.      Bed Mobility  Bed mobility  Rolling to Left: Minimal assistance  Rolling to Right: Minimal assistance  Supine to Sit: Minimal assistance  Bed Mobility Comments: Bed flat with rails, increased effort, assist to move legs to EOB and to complete log roll and trunk assist to full EOB sit    Seated and Standing Balance:  Balance  Sitting: Impaired  Sitting - Static: Good (unsupported)  Sitting - Dynamic: Fair (occasional)  Standing: Impaired  Standing - Static: Fair  Standing - Dynamic: Fair    Functional Endurance:  Activity Tolerance  Activity Tolerance: Patient Tolerated treatment well    D/C Recommendations:  OT D/C RECOMMENDATIONS  REQUIRES OT FOLLOW-UP: Yes    Equipment Recommendations:  OT Equipment Recommendations  Other: Continue to assess    OT Education:   Patient Education  Education Given To: Patient; Family  Education Provided: Role of Therapy;Plan of Care  Barriers to Learning: Other (Comment) (Language barrier, unable to use  d/t hearing)  Education Outcome: Continued education needed    OT Follow Up:   OT D/C RECOMMENDATIONS  REQUIRES OT FOLLOW-UP: Yes       Assessment/Discharge Disposition:  Assessment: Pt is a 80year old woman from home with family seen on med surg following medical change. Pt. demonstrates decreased balance and endurance as well as decreased safety awareness. Pt. would benefit from continued OT to maximize independence and safety with ADL tasks.   Performance deficits / Impairments: Decreased functional mobility , Decreased ADL status, Decreased strength, Decreased endurance, Decreased high-level IADLs, Decreased fine motor control, Decreased coordination, Decreased cognition, Decreased safe awareness, Decreased balance  Prognosis: Good  Discharge Recommendations: Continue to assess pending progress  Decision Making: Medium Complexity  History: Pt's medical history is moderately complex  Exam: Pt. has 10 performance deficits  Assistance / Modification: Pt requires min A    AMPAC (Six Click) Self care Score   How much help for putting on and taking off regular lower body clothing?: A Lot  How much help for Bathing?: A Lot  How much help for Toileting?: A Lot  How much help for putting on and taking off regular upper body clothing?: A Little  How much help for taking care of personal grooming?: A Little  How much help for eating meals?: A Little  AM-[facility] Inpatient Daily Activity Raw Score: 15  AM-PAC Inpatient ADL T-Scale Score : 34.69  ADL Inpatient CMS 0-100% Score: 56.46    Therapy key for assistance levels -   Independent/Mod I = Pt. is able to perform task with no assistance but may require a device   Stand by assistance = Pt. does not perform task at an independent level but does not need

## 2022-08-02 NOTE — PLAN OF CARE
See OT evaluation for all goals and OT POC.  Electronically signed by OSMANI Davis/L on 8/2/2022 at 12:32 PM

## 2022-08-03 ENCOUNTER — HOSPITAL ENCOUNTER (INPATIENT)
Age: 87
LOS: 15 days | Discharge: NURSING, REHAB OR LONG-TERM CARE | DRG: 073 | End: 2022-08-18
Attending: PHYSICAL MEDICINE & REHABILITATION | Admitting: PHYSICAL MEDICINE & REHABILITATION
Payer: MEDICARE

## 2022-08-03 VITALS
OXYGEN SATURATION: 94 % | SYSTOLIC BLOOD PRESSURE: 131 MMHG | DIASTOLIC BLOOD PRESSURE: 73 MMHG | HEIGHT: 60 IN | BODY MASS INDEX: 32.2 KG/M2 | HEART RATE: 71 BPM | WEIGHT: 164 LBS | RESPIRATION RATE: 16 BRPM | TEMPERATURE: 98.8 F

## 2022-08-03 PROBLEM — Z74.09 IMPAIRED MOBILITY AND ADLS: Status: ACTIVE | Noted: 2022-01-01

## 2022-08-03 PROBLEM — Z74.09 IMPAIRED MOBILITY AND ADLS: Status: RESOLVED | Noted: 2022-08-03 | Resolved: 2022-08-03

## 2022-08-03 PROBLEM — Z78.9 IMPAIRED MOBILITY AND ADLS: Status: ACTIVE | Noted: 2022-01-01

## 2022-08-03 PROBLEM — Z74.09 IMPAIRED MOBILITY: Status: RESOLVED | Noted: 2021-02-19 | Resolved: 2022-08-03

## 2022-08-03 PROBLEM — Z78.9 IMPAIRED MOBILITY AND ADLS: Status: RESOLVED | Noted: 2022-08-03 | Resolved: 2022-08-03

## 2022-08-03 LAB
ALBUMIN SERPL-MCNC: 3 G/DL (ref 3.5–4.6)
ANION GAP SERPL CALCULATED.3IONS-SCNC: 11 MEQ/L (ref 9–15)
BUN BLDV-MCNC: 10 MG/DL (ref 8–23)
CALCIUM SERPL-MCNC: 8.7 MG/DL (ref 8.5–9.9)
CHLORIDE BLD-SCNC: 101 MEQ/L (ref 95–107)
CO2: 26 MEQ/L (ref 20–31)
CREAT SERPL-MCNC: 0.45 MG/DL (ref 0.5–0.9)
GFR AFRICAN AMERICAN: >60
GFR NON-AFRICAN AMERICAN: >60
GLUCOSE BLD-MCNC: 99 MG/DL (ref 70–99)
HCT VFR BLD CALC: 31.4 % (ref 37–47)
HEMOGLOBIN: 10.4 G/DL (ref 12–16)
INR BLD: 1.1
MAGNESIUM: 2.1 MG/DL (ref 1.7–2.4)
MCH RBC QN AUTO: 27.8 PG (ref 27–31.3)
MCHC RBC AUTO-ENTMCNC: 33.1 % (ref 33–37)
MCV RBC AUTO: 84 FL (ref 82–100)
PDW BLD-RTO: 15.5 % (ref 11.5–14.5)
PHOSPHORUS: 3.4 MG/DL (ref 2.3–4.8)
PLATELET # BLD: 245 K/UL (ref 130–400)
POTASSIUM SERPL-SCNC: 3.7 MEQ/L (ref 3.4–4.9)
PROTHROMBIN TIME: 14.6 SEC (ref 12.3–14.9)
RBC # BLD: 3.74 M/UL (ref 4.2–5.4)
SODIUM BLD-SCNC: 138 MEQ/L (ref 135–144)
WBC # BLD: 6.1 K/UL (ref 4.8–10.8)

## 2022-08-03 PROCEDURE — 85610 PROTHROMBIN TIME: CPT

## 2022-08-03 PROCEDURE — 85027 COMPLETE CBC AUTOMATED: CPT

## 2022-08-03 PROCEDURE — 6370000000 HC RX 637 (ALT 250 FOR IP): Performed by: NURSE PRACTITIONER

## 2022-08-03 PROCEDURE — 36415 COLL VENOUS BLD VENIPUNCTURE: CPT

## 2022-08-03 PROCEDURE — 97535 SELF CARE MNGMENT TRAINING: CPT

## 2022-08-03 PROCEDURE — 2580000003 HC RX 258: Performed by: NURSE PRACTITIONER

## 2022-08-03 PROCEDURE — 2700000000 HC OXYGEN THERAPY PER DAY

## 2022-08-03 PROCEDURE — 6360000002 HC RX W HCPCS

## 2022-08-03 PROCEDURE — 6370000000 HC RX 637 (ALT 250 FOR IP): Performed by: PHYSICAL MEDICINE & REHABILITATION

## 2022-08-03 PROCEDURE — 1180000000 HC REHAB R&B

## 2022-08-03 PROCEDURE — 6370000000 HC RX 637 (ALT 250 FOR IP): Performed by: INTERNAL MEDICINE

## 2022-08-03 PROCEDURE — 99233 SBSQ HOSP IP/OBS HIGH 50: CPT | Performed by: INTERNAL MEDICINE

## 2022-08-03 PROCEDURE — 83735 ASSAY OF MAGNESIUM: CPT

## 2022-08-03 PROCEDURE — 2580000003 HC RX 258: Performed by: INTERNAL MEDICINE

## 2022-08-03 PROCEDURE — 6360000002 HC RX W HCPCS: Performed by: INTERNAL MEDICINE

## 2022-08-03 PROCEDURE — 80069 RENAL FUNCTION PANEL: CPT

## 2022-08-03 PROCEDURE — 97116 GAIT TRAINING THERAPY: CPT

## 2022-08-03 PROCEDURE — 99232 SBSQ HOSP IP/OBS MODERATE 35: CPT | Performed by: PHYSICAL MEDICINE & REHABILITATION

## 2022-08-03 RX ORDER — ACETAMINOPHEN 325 MG/1
650 TABLET ORAL EVERY 6 HOURS PRN
Status: DISCONTINUED | OUTPATIENT
Start: 2022-08-03 | End: 2022-08-03

## 2022-08-03 RX ORDER — HYDROCODONE BITARTRATE AND ACETAMINOPHEN 5; 325 MG/1; MG/1
1 TABLET ORAL EVERY 4 HOURS PRN
Status: DISCONTINUED | OUTPATIENT
Start: 2022-08-03 | End: 2022-08-18 | Stop reason: HOSPADM

## 2022-08-03 RX ORDER — LOPERAMIDE HYDROCHLORIDE 2 MG/1
2 CAPSULE ORAL 4 TIMES DAILY PRN
Status: DISCONTINUED | OUTPATIENT
Start: 2022-08-03 | End: 2022-08-18 | Stop reason: HOSPADM

## 2022-08-03 RX ORDER — ACETAMINOPHEN 325 MG/1
650 TABLET ORAL EVERY 4 HOURS PRN
Status: DISCONTINUED | OUTPATIENT
Start: 2022-08-03 | End: 2022-08-18 | Stop reason: HOSPADM

## 2022-08-03 RX ORDER — POLYETHYLENE GLYCOL 3350 17 G/17G
17 POWDER, FOR SOLUTION ORAL DAILY PRN
Status: DISCONTINUED | OUTPATIENT
Start: 2022-08-03 | End: 2022-08-18 | Stop reason: HOSPADM

## 2022-08-03 RX ORDER — SODIUM CHLORIDE 0.9 % (FLUSH) 0.9 %
5-40 SYRINGE (ML) INJECTION PRN
Status: CANCELLED | OUTPATIENT
Start: 2022-08-03

## 2022-08-03 RX ORDER — WARFARIN SODIUM 5 MG/1
5 TABLET ORAL ONCE
Status: DISCONTINUED | OUTPATIENT
Start: 2022-08-03 | End: 2022-08-03 | Stop reason: HOSPADM

## 2022-08-03 RX ORDER — POLYETHYLENE GLYCOL 3350 17 G/17G
17 POWDER, FOR SOLUTION ORAL DAILY PRN
Status: CANCELLED | OUTPATIENT
Start: 2022-08-03

## 2022-08-03 RX ORDER — WARFARIN SODIUM 5 MG/1
5 TABLET ORAL ONCE
Status: COMPLETED | OUTPATIENT
Start: 2022-08-03 | End: 2022-08-03

## 2022-08-03 RX ORDER — LEVOTHYROXINE SODIUM 88 UG/1
88 TABLET ORAL DAILY
Status: CANCELLED | OUTPATIENT
Start: 2022-08-04

## 2022-08-03 RX ORDER — L. ACIDOPHILUS/L.BULGARICUS 1MM CELL
2 TABLET ORAL 3 TIMES DAILY
Status: CANCELLED | OUTPATIENT
Start: 2022-08-03

## 2022-08-03 RX ORDER — ONDANSETRON 2 MG/ML
4 INJECTION INTRAMUSCULAR; INTRAVENOUS EVERY 6 HOURS PRN
Status: CANCELLED | OUTPATIENT
Start: 2022-08-03

## 2022-08-03 RX ORDER — ONDANSETRON 4 MG/1
4 TABLET, ORALLY DISINTEGRATING ORAL EVERY 8 HOURS PRN
Status: DISCONTINUED | OUTPATIENT
Start: 2022-08-03 | End: 2022-08-18 | Stop reason: HOSPADM

## 2022-08-03 RX ORDER — ENOXAPARIN SODIUM 100 MG/ML
1 INJECTION SUBCUTANEOUS 2 TIMES DAILY
Status: CANCELLED | OUTPATIENT
Start: 2022-08-03

## 2022-08-03 RX ORDER — CYANOCOBALAMIN 1000 UG/ML
1000 INJECTION INTRAMUSCULAR; SUBCUTANEOUS WEEKLY
Status: DISCONTINUED | OUTPATIENT
Start: 2022-08-03 | End: 2022-08-18 | Stop reason: HOSPADM

## 2022-08-03 RX ORDER — SODIUM CHLORIDE 0.9 % (FLUSH) 0.9 %
5-40 SYRINGE (ML) INJECTION EVERY 12 HOURS SCHEDULED
Status: CANCELLED | OUTPATIENT
Start: 2022-08-03

## 2022-08-03 RX ORDER — ACETAMINOPHEN 325 MG/1
650 TABLET ORAL EVERY 6 HOURS PRN
Status: CANCELLED | OUTPATIENT
Start: 2022-08-03

## 2022-08-03 RX ORDER — UBIDECARENONE 100 MG
100 CAPSULE ORAL DAILY
Status: DISCONTINUED | OUTPATIENT
Start: 2022-08-03 | End: 2022-08-08

## 2022-08-03 RX ORDER — L. ACIDOPHILUS/L.BULGARICUS 1MM CELL
2 TABLET ORAL 3 TIMES DAILY
Status: DISCONTINUED | OUTPATIENT
Start: 2022-08-03 | End: 2022-08-18 | Stop reason: HOSPADM

## 2022-08-03 RX ORDER — SODIUM CHLORIDE 0.9 % (FLUSH) 0.9 %
5-40 SYRINGE (ML) INJECTION EVERY 12 HOURS SCHEDULED
Status: DISCONTINUED | OUTPATIENT
Start: 2022-08-03 | End: 2022-08-16

## 2022-08-03 RX ORDER — WARFARIN SODIUM 5 MG/1
5 TABLET ORAL DAILY
Status: CANCELLED | OUTPATIENT
Start: 2022-08-03

## 2022-08-03 RX ORDER — FERROUS SULFATE 325(65) MG
325 TABLET ORAL EVERY OTHER DAY
Status: CANCELLED | OUTPATIENT
Start: 2022-08-04

## 2022-08-03 RX ORDER — PANTOPRAZOLE SODIUM 40 MG/1
40 TABLET, DELAYED RELEASE ORAL
Status: DISCONTINUED | OUTPATIENT
Start: 2022-08-04 | End: 2022-08-18 | Stop reason: HOSPADM

## 2022-08-03 RX ORDER — FUROSEMIDE 20 MG/1
20 TABLET ORAL DAILY
Status: DISCONTINUED | OUTPATIENT
Start: 2022-08-03 | End: 2022-08-03 | Stop reason: HOSPADM

## 2022-08-03 RX ORDER — SODIUM CHLORIDE 9 MG/ML
INJECTION, SOLUTION INTRAVENOUS PRN
Status: CANCELLED | OUTPATIENT
Start: 2022-08-03

## 2022-08-03 RX ORDER — FUROSEMIDE 20 MG/1
20 TABLET ORAL DAILY
Status: DISCONTINUED | OUTPATIENT
Start: 2022-08-04 | End: 2022-08-18 | Stop reason: HOSPADM

## 2022-08-03 RX ORDER — ONDANSETRON 4 MG/1
4 TABLET, ORALLY DISINTEGRATING ORAL EVERY 8 HOURS PRN
Status: CANCELLED | OUTPATIENT
Start: 2022-08-03

## 2022-08-03 RX ORDER — SODIUM CHLORIDE 0.9 % (FLUSH) 0.9 %
5-40 SYRINGE (ML) INJECTION PRN
Status: DISCONTINUED | OUTPATIENT
Start: 2022-08-03 | End: 2022-08-17

## 2022-08-03 RX ORDER — PANTOPRAZOLE SODIUM 40 MG/1
40 TABLET, DELAYED RELEASE ORAL
Status: CANCELLED | OUTPATIENT
Start: 2022-08-04

## 2022-08-03 RX ORDER — SODIUM CHLORIDE 9 MG/ML
INJECTION, SOLUTION INTRAVENOUS PRN
Status: DISCONTINUED | OUTPATIENT
Start: 2022-08-03 | End: 2022-08-17

## 2022-08-03 RX ORDER — FERROUS SULFATE 325(65) MG
325 TABLET ORAL EVERY OTHER DAY
Status: DISCONTINUED | OUTPATIENT
Start: 2022-08-06 | End: 2022-08-18 | Stop reason: HOSPADM

## 2022-08-03 RX ORDER — BISACODYL 10 MG
10 SUPPOSITORY, RECTAL RECTAL DAILY PRN
Status: DISCONTINUED | OUTPATIENT
Start: 2022-08-03 | End: 2022-08-18 | Stop reason: HOSPADM

## 2022-08-03 RX ORDER — SODIUM PHOSPHATE, DIBASIC AND SODIUM PHOSPHATE, MONOBASIC 7; 19 G/133ML; G/133ML
1 ENEMA RECTAL DAILY PRN
Status: DISCONTINUED | OUTPATIENT
Start: 2022-08-03 | End: 2022-08-18 | Stop reason: HOSPADM

## 2022-08-03 RX ORDER — CARVEDILOL 6.25 MG/1
6.25 TABLET ORAL 2 TIMES DAILY WITH MEALS
Status: DISCONTINUED | OUTPATIENT
Start: 2022-08-03 | End: 2022-08-18 | Stop reason: HOSPADM

## 2022-08-03 RX ORDER — CARVEDILOL 6.25 MG/1
6.25 TABLET ORAL 2 TIMES DAILY WITH MEALS
Status: CANCELLED | OUTPATIENT
Start: 2022-08-03

## 2022-08-03 RX ORDER — LIDOCAINE 4 G/G
3 PATCH TOPICAL DAILY
Status: DISCONTINUED | OUTPATIENT
Start: 2022-08-03 | End: 2022-08-18 | Stop reason: HOSPADM

## 2022-08-03 RX ORDER — ENOXAPARIN SODIUM 100 MG/ML
1 INJECTION SUBCUTANEOUS 2 TIMES DAILY
Status: DISCONTINUED | OUTPATIENT
Start: 2022-08-03 | End: 2022-08-09

## 2022-08-03 RX ORDER — CITALOPRAM 20 MG/1
20 TABLET ORAL DAILY
Status: CANCELLED | OUTPATIENT
Start: 2022-08-04

## 2022-08-03 RX ORDER — HYDROCODONE BITARTRATE AND ACETAMINOPHEN 5; 325 MG/1; MG/1
1 TABLET ORAL EVERY 4 HOURS PRN
Status: CANCELLED | OUTPATIENT
Start: 2022-08-03

## 2022-08-03 RX ORDER — CITALOPRAM 10 MG/1
20 TABLET ORAL DAILY
Status: DISCONTINUED | OUTPATIENT
Start: 2022-08-04 | End: 2022-08-18 | Stop reason: HOSPADM

## 2022-08-03 RX ORDER — ONDANSETRON 2 MG/ML
4 INJECTION INTRAMUSCULAR; INTRAVENOUS EVERY 6 HOURS PRN
Status: DISCONTINUED | OUTPATIENT
Start: 2022-08-03 | End: 2022-08-18 | Stop reason: HOSPADM

## 2022-08-03 RX ORDER — FUROSEMIDE 20 MG/1
20 TABLET ORAL DAILY
Status: CANCELLED | OUTPATIENT
Start: 2022-08-04

## 2022-08-03 RX ORDER — VITAMIN B COMPLEX
2000 TABLET ORAL
Status: DISCONTINUED | OUTPATIENT
Start: 2022-08-03 | End: 2022-08-18 | Stop reason: HOSPADM

## 2022-08-03 RX ORDER — LEVOTHYROXINE SODIUM 88 UG/1
88 TABLET ORAL DAILY
Status: DISCONTINUED | OUTPATIENT
Start: 2022-08-04 | End: 2022-08-18 | Stop reason: HOSPADM

## 2022-08-03 RX ORDER — LOPERAMIDE HYDROCHLORIDE 2 MG/1
2 CAPSULE ORAL 4 TIMES DAILY PRN
Status: CANCELLED | OUTPATIENT
Start: 2022-08-03

## 2022-08-03 RX ADMIN — CARVEDILOL 6.25 MG: 6.25 TABLET, FILM COATED ORAL at 16:22

## 2022-08-03 RX ADMIN — CEFTRIAXONE SODIUM 1000 MG: 1 INJECTION, POWDER, FOR SOLUTION INTRAMUSCULAR; INTRAVENOUS at 16:26

## 2022-08-03 RX ADMIN — WARFARIN SODIUM 5 MG: 5 TABLET ORAL at 16:22

## 2022-08-03 RX ADMIN — CITALOPRAM HYDROBROMIDE 20 MG: 20 TABLET ORAL at 08:50

## 2022-08-03 RX ADMIN — ACETAMINOPHEN 650 MG: 325 TABLET ORAL at 12:49

## 2022-08-03 RX ADMIN — Medication 10 ML: at 23:49

## 2022-08-03 RX ADMIN — LEVOTHYROXINE SODIUM 88 MCG: 88 TABLET ORAL at 08:50

## 2022-08-03 RX ADMIN — ENOXAPARIN SODIUM 70 MG: 100 INJECTION SUBCUTANEOUS at 22:18

## 2022-08-03 RX ADMIN — FUROSEMIDE 20 MG: 20 TABLET ORAL at 10:51

## 2022-08-03 RX ADMIN — LACTOBACILLUS TAB 2 TABLET: TAB at 16:21

## 2022-08-03 RX ADMIN — SODIUM CHLORIDE, PRESERVATIVE FREE 10 ML: 5 INJECTION INTRAVENOUS at 08:51

## 2022-08-03 RX ADMIN — ENOXAPARIN SODIUM 70 MG: 100 INJECTION SUBCUTANEOUS at 08:52

## 2022-08-03 RX ADMIN — MICONAZOLE NITRATE: 2 POWDER TOPICAL at 08:51

## 2022-08-03 RX ADMIN — Medication 2000 UNITS: at 16:22

## 2022-08-03 RX ADMIN — CARVEDILOL 6.25 MG: 6.25 TABLET, FILM COATED ORAL at 08:50

## 2022-08-03 RX ADMIN — LACTOBACILLUS TAB 2 TABLET: TAB at 22:17

## 2022-08-03 RX ADMIN — FERROUS SULFATE TAB 325 MG (65 MG ELEMENTAL FE) 325 MG: 325 (65 FE) TAB at 08:48

## 2022-08-03 RX ADMIN — LACTOBACILLUS TAB 2 TABLET: TAB at 08:50

## 2022-08-03 RX ADMIN — PANTOPRAZOLE SODIUM 40 MG: 40 TABLET, DELAYED RELEASE ORAL at 05:26

## 2022-08-03 ASSESSMENT — ENCOUNTER SYMPTOMS
CONSTIPATION: 0
SHORTNESS OF BREATH: 1
EYES NEGATIVE: 1
VOICE CHANGE: 0
CHEST TIGHTNESS: 0
COUGH: 0
SHORTNESS OF BREATH: 0
BLOOD IN STOOL: 0
DIARRHEA: 0
STRIDOR: 0
GASTROINTESTINAL NEGATIVE: 1
NAUSEA: 0
RESPIRATORY NEGATIVE: 1
TROUBLE SWALLOWING: 0
ABDOMINAL PAIN: 0
WHEEZING: 0

## 2022-08-03 ASSESSMENT — LIFESTYLE VARIABLES
HOW OFTEN DO YOU HAVE A DRINK CONTAINING ALCOHOL: NEVER
HOW MANY STANDARD DRINKS CONTAINING ALCOHOL DO YOU HAVE ON A TYPICAL DAY: PATIENT DOES NOT DRINK

## 2022-08-03 ASSESSMENT — PAIN SCALES - GENERAL: PAINLEVEL_OUTOF10: 0

## 2022-08-03 NOTE — FLOWSHEET NOTE
Patient admitted to room 250. Oriented to room, unit, and call light. Patient mostly [place] speaking, daughter in room to assist with information gathering. Patient denies pain or discomfort. Consents to be signed by son who is POA when he arrives. Noted drain to right upper abdominal quadrant draining serous fluid. Skin assessment completed by 2 nurses, see assessment. normal

## 2022-08-03 NOTE — PROGRESS NOTES
Physical Therapy Med Surg Daily Treatment Note  Facility/Department: [facility]   Room: D305/B023-78       NAME: Kelsy Rodriguez  : 1932 (80 y.o.)  MRN: [id number]  CODE STATUS: DNR-CC    Date of Service: 8/3/2022    Patient Diagnosis(es): Abscess [L02.91]   No chief complaint on file.     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     Aortic thrombus ([address] 2022    Abscess 2022    Hx of drainage of abscess 2022    Generalized abdominal pain 2022    Streptococcus viridans infection 2022    Abdominal mass 2022    Right lower quadrant abdominal pain 2022    Hyponatremia 2022    Hypokalemia 2022    Anemia 2022    CHF (congestive heart failure) ([address] 2022    Hypothyroid 2022    Central retinal vein occlusion, left eye, stable 2021    Lumbar stenosis with neurogenic claudication 2016    Chronic diastolic CHF (congestive heart failure) ([address] 2022    Acute cystitis with hematuria 2021    Impaired mobility 2021    Acute on chronic combined systolic (congestive) and diastolic (congestive) heart failure ([address] 2021    Pleural effusion 2021    Hypoxia     Acute pulmonary edema (HCC)     Gait abnormality 2021    Acute on chronic combined systolic and diastolic CHF (congestive heart failure) ([address] 2021    Essential hypertension 2018    Shortness of breath     Dizziness     Thoracic aortic aneurysm without rupture ([address] 2017    Descending aortic aneurysm ([address] 2017    Osteoarthritis     Myelopathy ([address]     Headache     Depression     Acquired scoliosis 2016    Osteoporosis 2016    Charcot Arleen Tooth muscular atrophy 2016    Excess weight 2016    Osteoarthritis of lumbar spine with myelopathy 2016        Past Medical History:   Diagnosis Date Ascending aortic aneurysm ([facility] 6/23/2017    Charcot Arleen Tooth muscular atrophy     CHF (congestive heart failure) (HCC)     Chronic diastolic CHF (congestive heart failure) ([facility] 4/1/2022    Depression     Descending aortic aneurysm ([facility] 6/23/2017    Essential hypertension 2/16/2018    Headache     HTN (hypertension)     Impaired mobility and activities of daily living     Lumbar stenosis with neurogenic claudication     Myelopathy ([facility]     Osteoarthritis      Past Surgical History:   Procedure Laterality Date    JOINT REPLACEMENT Bilateral     knees    OTHER SURGICAL HISTORY Right 07/21/2022    cat scan guided abdominal mass aspiration with drain placement by Dr. Viky Lilly Left 02/25/2021    LEFT PLEURAL CATHETER INSERTION performed by Alonso Alanis MD at [place] [id number] Left 01/29/2021    total of 755 cc removed per Dr Dameon Taylor specimen sent to lab       Chart Reviewed: Yes  Patient assessed for rehabilitation services?: Yes  Family / Caregiver Present: Yes (dtr)    Restrictions:  Restrictions/Precautions: Fall Risk  Position Activity Restriction  Other position/activity restrictions: Abdominal drain    SUBJECTIVE:   Subjective: Patient resting in bed. Agreeable to tx. Pain   Denies pain     OBJECTIVE:   Bed mobility  Rolling to Right: Minimal assistance  Supine to Sit: Minimal assistance  Scooting: Minimal assistance  Bed Mobility Comments: HOB slightly elevated. Patient requires increased time/effort to complete. Increased difficulty scooting forward towards EOB.     Transfers  Sit to Stand: Minimal Assistance  Stand to sit: Minimal Assistance  Bed to Chair: Minimal assistance    Ambulation  WB Status: wbat  Ambulation  Surface: level tile  Device: Hand-Held Assist  Other Apparatus: O2 (3 liters)  Assistance: Contact guard assistance;Minimal assistance  Quality of Gait: flexed forward posture, Bilateral drop foot R>L, no LOB however balance deficits worsens with turns and retro steps, fatigued quickly. Gait Deviations: Slow Lea;Decreased step length  Distance: 15ft x2       ASSESSMENT   Body Structures, Functions, Activity Limitations Requiring Skilled Therapeutic Intervention: Decreased functional mobility ; Decreased strength;Decreased posture;Decreased balance;Decreased endurance;Decreased safe awareness  Assessment: Patient requires increased time and effort to complete bed mobility and transfers. Demonstrates decreased safety with approach to sit in chair. Provided verbal cues for safety throughout session. Therapy Prognosis: Good  Barriers to Learning: language barrier, heard of hearing     Discharge Recommendations:  Continue to assess pending progress    Goals  Long Term Goals  Long term goal 1: Bed mobility with indep  Long term goal 2: Functional transfers with indep  Long term goal 3: Amb 50ft with LRAD and indep  Long term goal 4: 2 steps with handrail with supervision to enter/exit home  Long term goal 5: 5xSTS <18 seconds to demonstrate decreased fall risk    PLAN    Plan: 1 time a day 3-6 times a week        AMPAC (6 CLICK) BASIC MOBILITY  AM-PAC Inpatient Mobility Raw Score : 15     Therapy Time   Individual   Time In 1045   Time Out 1109   Minutes 24     Tr 15  Gt [address] [place] [address], 08/03/22 at 11:49 AM         Definitions for assistance levels  Independent = pt does not require any physical supervision or assistance from another person for activity completion. Device may be needed.   Stand by assistance = pt requires verbal cues or instructions from another person, close to but not touching, to perform the activity  Minimal assistance= pt performs 75% or more of the activity; assistance is required to complete the activity  Moderate assistance= pt performs 50% of the activity; assistance is required to complete the activity  Maximal assistance = pt performs 25% of the activity; assistance is required to complete the activity  Dependent = pt requires total physical assistance to accomplish the task

## 2022-08-03 NOTE — PROGRESS NOTES
Warfarin Dosing - Pharmacy Consult Note  Consulting Provider: Dr Jaiden Gee  Indication:   mural thrombus (Xarelto failure?)  Warfarin Dose prior to admission: NA   Concurrent anticoagulants/antiplatelets: Lovenox 1 mg/kg bid  Significant Drug Interactions:  synthroid, protonix, rocephin  Recent Labs     08/01/22  1435 08/02/22  0527 08/02/22  1022 08/03/22  0518   INR  --   --  1.2 1.1   HGB 10.1* 10.3*  --  10.4*    258  --  245   LABALBU 3.0* 3.1*  --  3.0*     Recent warfarin administrations                     warfarin (COUMADIN) tablet 5 mg (mg) 5 mg Given 08/02/22 1224                   Date   INR    Dose  08/02/22   1.2    5 mg  08/03/22   1.1    5 mg    Assessment/Plan  (Goal INR: 2 - 3)  PT was dosed x 1 yesterday with warfarin 5 mg (new start) by provider. Will re-dose with 5 mg x 1 today in order to raise INR quickly and stop lovenox, however anticipate less aggressive dosing for maintenance    Active problem list reviewed. INR orders are placed. Chart reviewed for pertinent labs, drug/diet interactions, and past doses. Documentation of patient's clinical condition was reviewed. Pharmacy Dosing:  Pharmacy will continue to follow.       Laila Brumfield [facility]  8/3/2022  11:23 AM

## 2022-08-03 NOTE — PROGRESS NOTES
Hospitalist Progress Note      PCP: Ebenezer Hurt MD    Date of Admission: 7/29/2022    Chief Complaint:  no acute events, afebrile, stable HD    Medications:  Reviewed    Infusion Medications    sodium chloride       Scheduled Medications    furosemide  20 mg Oral Daily    enoxaparin  1 mg/kg SubCUTAneous BID    warfarin  5 mg Oral Daily    carvedilol  6.25 mg Oral BID WC    cefTRIAXone (ROCEPHIN) IV  1,000 mg IntraVENous Q24H    citalopram  20 mg Oral Daily    ferrous sulfate  325 mg Oral Every Other Day    lactobacillus acidophilus  2 tablet Oral TID    levothyroxine  88 mcg Oral Daily    miconazole   Topical BID    pantoprazole  40 mg Oral QAM AC    sodium chloride flush  5-40 mL IntraVENous 2 times per day     PRN Meds: sodium chloride, acetaminophen, HYDROcodone 5 mg - acetaminophen, loperamide, ondansetron **OR** ondansetron, polyethylene glycol, potassium chloride, sodium chloride flush      Intake/Output Summary (Last 24 hours) at 8/3/2022 1103  Last data filed at 8/3/2022 0557  Gross per 24 hour   Intake 240 ml   Output 10 ml   Net 230 ml         Exam:    /73   Pulse 71   Temp 98.2 °F (36.8 °C) (Oral)   Resp 18   Ht 5' (1.524 m)   Wt 164 lb (74.4 kg)   SpO2 94%   BMI 32.03 kg/m²     General appearance: appears stated age and cooperative. Respiratory:  clear to auscultation, bilaterally  Cardiovascular: Regular rate and rhythm, S1/S2  Abdomen: Soft, active bowel sounds. Musculoskeletal: No edema bilaterally. Labs:   Recent Labs     08/01/22  1435 08/02/22  0527 08/03/22  0518   WBC 7.5 7.6 6.1   HGB 10.1* 10.3* 10.4*   HCT 31.8* 31.8* 31.4*    258 245       Recent Labs     08/01/22  1435 08/02/22  0527 08/03/22  0518    142 138   K 3.6 3.7 3.7    104 101   CO2 27 27 26   BUN 11 11 10   CREATININE 0.60 0.51 0.45*   CALCIUM 9.3 8.9 8.7   PHOS 3.1 3.5 3.4       No results for input(s): AST, ALT, BILIDIR, BILITOT, ALKPHOS in the last 72 hours.   Recent Labs 08/02/22  1022 08/03/22  0518   INR 1.2 1.1     No results for input(s): CKTOTAL, TROPONINI in the last 72 hours. Urinalysis:      Lab Results   Component Value Date/Time    NITRU Negative 07/18/2022 12:30 PM    WBCUA 20-50 07/18/2022 12:30 PM    BACTERIA Negative 07/18/2022 12:30 PM    RBCUA 6-10 07/18/2022 12:30 PM    BLOODU SMALL 07/18/2022 12:30 PM    SPECGRAV 1.044 07/18/2022 12:30 PM    GLUCOSEU Negative 07/18/2022 12:30 PM       Radiology:  No orders to display           Assessment/Plan:    89 y.o.,female who has PMH of CAD, DVT, PAF, HTN, pleural effusion, combined HF, ascending and descending aortic aneurysm, RLQ abscess/mass s/p drainage who was transferred from acute rehab after repeat CT showed enlarging AA with new large mural thrombus involving the descending portion of the aortic arch. Aortic aneurysm with new mural thrombus  - treated with Heparin infusion initially  - switched to [address] Lovenox since family refused transfer to [facility] for more aggressive treatment  - management per cardiology    RLQ mass/abscess  - s/p drain placed by IR  - culture grew Strep. Viridans  - on IV Rocephin  - followed by ID and oncology    Anemia   - follow H/H    Chronic respiratory failure  - on 3 liters of NC    Code status - changed from FULL to [facility], followed by palliative care      Disposition - acute rehab today          Electronically signed by Israel José MD on 8/3/2022 at 11:03 AM

## 2022-08-03 NOTE — CARE COORDINATION
Spoke with Manuel Oates,  Per Cardiology patient cleared for Rehab transfer today. Covid negative 8/2/22. Patient can transfer to Room 250.  Electronically signed by Manav Lomas RN on 8/3/22 at 10:46 AM EDT

## 2022-08-03 NOTE — DISCHARGE SUMMARY
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 *      Hospital Course:   Jake Wilson is a 80 y.o. female that was admitted and treated at [facility] for the following medical issues: Aortic aneurysm with new mural thrombus  - treated with Heparin infusion initially  - switched to [facility] since family refused transfer to [facility] for more aggressive treatment  - managed by  cardiology    RLQ mass/abscess  - s/p drain placed by IR  - culture grew Strep. Viridans  - on IV Rocephin  - followed by ID and oncology      Code status - changed from FULL to [facility], followed by palliative care      Disposition - acute rehab      Patient was seen by the following consultants while admitted to [facility]:   Consults:  [address] TO REHAB/TCU ADMISSION COORDINATOR  PHARMACY TO [facility]    Significant Diagnostic Studies:    CT ABDOMEN PELVIS W WO CONTRAST Additional Contrast? None    Result Date: 2022  1. The previously placed abscess drain in the right lower quadrant appears to be in the periphery of the abscess with loculations now more extending down to the lower thoracic aorta. This large thrombus was not seen on prior study. No significant retroperitoneal adenopathy or ascites is identified. Again seen is a cystic lesion involving the proximal ascending aorta which now appears to be more septated than prior. The previously placed drain is now in the periphery of the lesion. CT PELVIS:  A large cystic mass is again seen in the lower pelvis. Discharge Medications:         Medication List        ASK your doctor about these medications      amLODIPine 5 MG tablet  Commonly known as: NORVASC  Take 1 tablet by mouth daily     aspirin 81 MG tablet     budesonide-formoterol 160-4.5 MCG/ACT Aero  Commonly known as: SYMBICORT  Inhale 2 puffs into the lungs 2 times daily     carvedilol 6.25 MG tablet  Commonly known as: COREG  TAKE 1 TABLET TWICE A DAY     Centrum Silver Ultra Womens Tabs     citalopram 20 MG tablet  Commonly known as: CELEXA     digoxin 125 MCG tablet  Commonly known as: LANOXIN  TAKE 1 TABLET DAILY     EYE DROPS OP     ferrous sulfate 325 (65 Fe) MG tablet  Commonly known as: IRON 325  Take 1 tablet by mouth 2 times daily (with meals)     furosemide 20 MG tablet  Commonly known as: LASIX  TAKE 1 TABLET DAILY     meclizine 12.5 MG tablet  Commonly known as: ANTIVERT     nitroGLYCERIN 0.4 MG SL tablet  Commonly known as: NITROSTAT  up to max of 3 total doses. If no relief after 1 dose, call 911. Osteo Bi-Flex One Per Day Tabs     pantoprazole 40 MG tablet  Commonly known as: PROTONIX  TAKE 1 TABLET DAILY     potassium chloride 20 MEQ extended release tablet  Commonly known as: KLOR-CON M     Synthroid 88 MCG tablet  Generic drug: levothyroxine     vitamin B-12 1000 MCG tablet  Commonly known as: CYANOCOBALAMIN     Xarelto 15 MG Tabs tablet  Generic drug: rivaroxaban  TAKE 1 TABLET DAILY              Disposition:   Discharged to acute rehab.  Any [facility] needs that were indicated and/or required as been addressed and set up by Social Work.     Condition at discharge: Pt was medically stable at the time of discharge. Activity: activity as tolerated, fall precautions. Total time taken for discharging this patient: 40 minutes. Greater than 70% of time was spent focused exclusively on this patient. Time was taken to review chart, discuss plans with consultants, reconciling medications, discussing plan answering questions with patient. Signed:  Denver Madison MD  8/3/2022, 11:10 AM  ----------------------------------------------------------------------------------------------------------------------    Soila Rodriguez,     Please return to ER or call 911 if you develop any significant signs or symptoms. I may not have addressed all of your medical illnesses or the abnormal blood work or imaging therefore please ask your PCP Jennifer Noriega MD and other out patient specialists and providers  to obtain [facility] record entirely to follow up on all of the abnormal labs, imaging and findings that I have and have not addressed during your hospitalization. Discharging you from the hospital does not mean that your medical care ends here and now. You may still need additional work up, investigation, monitoring, and treatment to be handled from this point on by outside providers including your PCP, Jennifer Noriega MD , Specialists and other healthcare providers. Please review your list of discharge medications prior to resuming medications you might still have at home, as the medications you need to be taking, dosages or how often you must take them may have changed. For medication questions, contact your retail pharmacy and your PCP, Jennifer Noriega MD .     ** I STRONGLY RECOMMEND that you follow up with Jennifer Noriega MD within 3 to 5 days for a post hospitalization evaluation. This specific office visit is covered by your insurance, and is not the same as your annual doctor visit/ check up.  This office visit is important, as it may prevent need for repeat and/or future hospitalizations. **    Your medical team at [facility] ([facility] appreciates the opportunity to work with you to get well!     Sincerely,  Kevin Rios MD

## 2022-08-03 NOTE — PROGRESS NOTES
Progress Note  Patient: Ayla Rogel  Unit/Bed: Z636/D158-08  YOB: 1932  MRN: [id number]  Acct: [de-identified]   Admitting Diagnosis: Abscess [L02.91]  Admit Date:  7/29/2022  Hospital Day: 5    Chief Complaint: PAF    Histories:  Past Medical History:   Diagnosis Date    Ascending aortic aneurysm ([facility] [address] 6/23/2017    Charcot Arleen Tooth muscular atrophy     CHF (congestive heart failure) ([facility])     Chronic diastolic CHF (congestive heart failure) ([facility] [address] 4/1/2022    Depression     Descending aortic aneurysm ([facility] [address] 6/23/2017    Essential hypertension 2/16/2018    Headache     HTN (hypertension)     Impaired mobility and activities of daily living     Lumbar stenosis with neurogenic claudication     Myelopathy ([facility] [address]     Osteoarthritis      Past Surgical History:   Procedure Laterality Date    JOINT REPLACEMENT Bilateral     knees    OTHER SURGICAL HISTORY Right 07/21/2022    cat scan guided abdominal mass aspiration with drain placement by Dr. Deanne Connors Left 02/25/2021    LEFT PLEURAL CATHETER INSERTION performed by Jayro Rodriguez MD at [place] [address] Left 01/29/2021    total of 755 cc removed per Dr Lillian Andino specimen sent to lab     Family History   Problem Relation Age of Onset    Arthritis Mother     Arthritis Father     High Blood Pressure Father      Social History     Socioeconomic History    Marital status:       Spouse name: None    Number of children: 2    Years of education: None    Highest education level: None   Tobacco Use    Smoking status: Never    Smokeless tobacco: Never   Substance and Sexual Activity    Alcohol use: No     Alcohol/week: 0.0 standard drinks    Drug use: No   Social History Narrative    , Lives With: Alone, son lives down the street, dtr is in the area    Type of Home: [address]: One level    Home Access: Stairs to enter with rails- Number of Steps: 2- Rails: Both    Bathroom Shower/Tub: Tub/Shower unit, Bathroom Equipment: Grab bars in shower, Shower chair    Home Equipment: Rolling walker, Cane(Pt infrequemtly uses DME for ambulation and prefers to furniture walk in home)    ADL Assistance: Independent, [address] Responsibilities: Yes    Ambulation Assistance: Independent, Transfer Assistance: Independent    Additional Comments: Son stops over 2 times daily           Subjective/HPI   no acute events. Denies cp nor so. No abd pain. She c/o sob when lying flat. Daughter and son in room this am.     EKG:  pt refusing Monitor        Review of Systems:   Review of Systems   Constitutional: Negative. Negative for diaphoresis and fatigue. HENT: Negative. Eyes: Negative. Respiratory: Negative. Negative for cough, chest tightness, shortness of breath, wheezing and stridor. Cardiovascular: Negative. Negative for chest pain, palpitations and leg swelling. Gastrointestinal: Negative. Negative for blood in stool and nausea. Genitourinary: Negative. Musculoskeletal: Negative. Skin: Negative. Neurological:  Positive for dizziness. Negative for syncope, weakness and light-headedness. Hematological: Negative. Psychiatric/Behavioral: Negative. Physical Examination:    /73   Pulse 71   Temp 98.2 °F (36.8 °C) (Oral)   Resp 18   Ht 5' (1.524 m)   Wt 164 lb (74.4 kg)   SpO2 94%   BMI 32.03 kg/m²    Physical Exam   Constitutional: She appears healthy. No distress. HENT:   Normal cephalic and Atraumatic   Eyes: Pupils are equal, round, and reactive to light. Neck: Thyroid normal. No JVD present. No neck adenopathy. No thyromegaly present. Cardiovascular: Normal rate, normal heart sounds, intact distal pulses and normal pulses. An irregularly irregular rhythm present. Pulmonary/Chest: Effort normal and breath sounds normal. She has no wheezes. She has no rales. She exhibits no tenderness. Abdominal: Soft.  Bowel sounds are normal. There is no abdominal tenderness. Musculoskeletal:         General: No tenderness or edema. Normal range of motion. Cervical back: Normal range of motion and neck supple. Neurological: She is alert and oriented to person, place, and time. Skin: Skin is warm. No cyanosis. Nails show no clubbing.      LABS:  CBC:   Lab Results   Component Value Date/Time    WBC 6.1 08/03/2022 05:18 AM    RBC 3.74 08/03/2022 05:18 AM    HGB 10.4 08/03/2022 05:18 AM    HCT 31.4 08/03/2022 05:18 AM    MCV 84.0 08/03/2022 05:18 AM    MCH 27.8 08/03/2022 05:18 AM    MCHC 33.1 08/03/2022 05:18 AM    RDW 15.5 08/03/2022 05:18 AM     08/03/2022 05:18 AM     CBC with Differential:    Lab Results   Component Value Date/Time    WBC 6.1 08/03/2022 05:18 AM    RBC 3.74 08/03/2022 05:18 AM    HGB 10.4 08/03/2022 05:18 AM    HCT 31.4 08/03/2022 05:18 AM     08/03/2022 05:18 AM    MCV 84.0 08/03/2022 05:18 AM    MCH 27.8 08/03/2022 05:18 AM    MCHC 33.1 08/03/2022 05:18 AM    RDW 15.5 08/03/2022 05:18 AM    BANDSPCT 2 07/24/2022 05:00 AM    METASPCT 2 07/24/2022 05:00 AM    LYMPHOPCT 7.0 07/24/2022 05:00 AM    MONOPCT 5.8 07/24/2022 05:00 AM    BASOPCT 1.0 07/24/2022 05:00 AM    MONOSABS 0.7 07/24/2022 05:00 AM    LYMPHSABS 0.8 07/24/2022 05:00 AM    EOSABS 0.0 07/24/2022 05:00 AM    BASOSABS 0.1 07/24/2022 05:00 AM     CMP:    Lab Results   Component Value Date/Time     08/03/2022 05:18 AM    K 3.7 08/03/2022 05:18 AM    K 3.2 07/24/2022 05:00 AM     08/03/2022 05:18 AM    CO2 26 08/03/2022 05:18 AM    BUN 10 08/03/2022 05:18 AM    CREATININE 0.45 08/03/2022 05:18 AM    GFRAA >60.0 08/03/2022 05:18 AM    LABGLOM >60.0 08/03/2022 05:18 AM    GLUCOSE 99 08/03/2022 05:18 AM    PROT 6.0 07/24/2022 05:00 AM    LABALBU 3.0 08/03/2022 05:18 AM    CALCIUM 8.7 08/03/2022 05:18 AM    BILITOT 0.3 07/24/2022 05:00 AM    ALKPHOS 51 07/24/2022 05:00 AM    AST 20 07/24/2022 05:00 AM    ALT 19 07/24/2022 05:00 AM BMP:    Lab Results   Component Value Date/Time     08/03/2022 05:18 AM    K 3.7 08/03/2022 05:18 AM    K 3.2 07/24/2022 05:00 AM     08/03/2022 05:18 AM    CO2 26 08/03/2022 05:18 AM    BUN 10 08/03/2022 05:18 AM    LABALBU 3.0 08/03/2022 05:18 AM    CREATININE 0.45 08/03/2022 05:18 AM    CALCIUM 8.7 08/03/2022 05:18 AM    GFRAA >60.0 08/03/2022 05:18 AM    LABGLOM >60.0 08/03/2022 05:18 AM    GLUCOSE 99 08/03/2022 05:18 AM     Magnesium:    Lab Results   Component Value Date/Time    MG 2.1 08/03/2022 05:18 AM     Troponin:    Lab Results   Component Value Date/Time    TROPONINI <0.010 07/18/2022 12:30 PM        Active Hospital Problems    Diagnosis Date Noted    A-fib [facility]) [I48.91]      Priority: High    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 [Z74.09, Z78.9]      Priority: High    Abscess [L02.91] 07/29/2022     Priority: Medium    Lumbar stenosis with neurogenic claudication [M48.062] 06/02/2016     Priority: Medium    Acute on chronic combined systolic and diastolic CHF (congestive heart failure) ([facility] [address] [I50.43] 01/25/2021     Priority: Low    Descending aortic aneurysm ([facility] [address] [I71.9] 06/23/2017     Priority: Low    Thoracic aortic aneurysm without rupture ([facility] [address] [I71.2] 06/23/2017     Priority: Low        Assessment/Plan:  Abd Abscess - s/p Drainage   LVEF 60  Symptoms of Orthopnea- lungs are clear no JVD nor peripheral edema. - will give Empiric low dose Lasix. Will need periodic Lab at Rehab  Frequent PAF - rate is controlled on exam.   CAD- moderate - no angina. 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 Xarelto stopped and started Heparin. Had long discussion with pt and daughter. They no longer want aggressive Rx and does not want to be transferred to [facility]. Conitjamelue Lovenox until INR therapeutic. OK for Rehab transfer.           Electronically signed by Claudia Serna MD on 8/3/2022 at 9:14 AM

## 2022-08-03 NOTE — PROGRESS NOTES
clubbing, cyanosis or inflammatory conditions. Neuro/Psychiatric: Affect: flat-  Alert and oriented to self and situation with no needed cues. No significant change in deep tendon reflexes or sensation  Lungs:  Diminished, CTA-B  . Respiration effort is normal at rest.   Heart:   S1 = S2,   RRR. Abdomen:  Soft, generalized-tenderness-drain site right lower quadrant-culture grew Strep. Viridans  - on IV Rocephin q 24, followed by ID and oncology  Extremities:  Trace  lower extremity edema but no unusual tenderness. Skin:   BUE bruises dt blood draws, drain site right lower quadrant      Rehabilitation:  Physical Therapy:   Bed mobility:  Bed mobility  Rolling to Right: Minimal assistance (08/03/22 1143)  Supine to Sit: Minimal assistance (08/03/22 1143)  Sit to Supine: Unable to assess (08/02/22 1423)  Scooting: Minimal assistance (08/03/22 1143)  Bed Mobility Comments: HOB slightly elevated. Patient requires increased time/effort to complete. Increased difficullty scooting forward towards EOB. (08/03/22 1143)  Transfers:  Transfers  Sit to Stand: Minimal Assistance (08/03/22 1144)  Stand to sit: Minimal Assistance (08/03/22 1144)  Bed to Chair: Minimal assistance (08/03/22 1144)  Gait:   Ambulation  WB Status: wbat (08/03/22 1144)  Ambulation  Surface: level tile (08/03/22 1144)  Device: Hand-Held Assist (08/03/22 1144)  Other Apparatus: O2 (3 liters) (08/03/22 1144)  Assistance: Contact guard assistance;Minimal assistance (08/03/22 1144)  Quality of Gait: flexed forward posture, Bilateral drop foot R>L, no LOB however balance deficits worsens with turns and retro steps, fatigued quickly.  (08/03/22 1144)  Gait Deviations: Slow Lea;Decreased step length (08/03/22 1144)  Distance: 15ft x2 (08/03/22 1144)  Comments: pt adamently refused to use 2ww (08/02/22 1434)  Stairs:     W/C mobility:       Occupational Therapy:   Hand Dominance: Right  ADL  Feeding: Setup (08/02/22 1216)  Grooming: Setup (08/02/22 1216)  UE Bathing: Setup (08/02/22 1216)  LE Bathing: Moderate assistance (08/02/22 1216)  UE Dressing: Setup (08/02/22 1216)  LE Dressing: Maximum assistance (08/02/22 1216)  Toileting: Maximum assistance (08/02/22 1216)  Additional Comments: All ADLs simulated as above. Pt limited in her ability to sequence and does not attempt to perform her own toileting hygiene despite encouragement.  (08/02/22 1216)  Toilet Transfers  Toilet - Technique: Stand step (08/02/22 1223)  Equipment Used: Standard bedside commode (08/02/22 1223)  Toilet Transfer: Contact guard assistance (08/02/22 1223)  Toilet Transfers Comments: Poor safety awareness (08/02/22 1223)          Speech Therapy:            Diet/Swallow:                   COGNITION  OT: Cognition Comment: Pt with poor safety awareness, increased time and effort to understand d/t language barrier and [place] (pt unable to utilize audio  d/t hearing issues.)  SP:           Lab/X-ray studies reviewed, analyzed and discussed with patient and staff:   Recent Results (from the past 24 hour(s))   COVID-19, Rapid    Collection Time: 08/02/22  5:55 PM    Specimen: Nasopharyngeal Swab; Nasal swab   Result Value Ref Range    SARS-CoV-2, NAAT Not Detected Not Detected   Protime-INR    Collection Time: 08/03/22  5:18 AM   Result Value Ref Range    Protime 14.6 12.3 - 14.9 sec    INR 1.1    CBC    Collection Time: 08/03/22  5:18 AM   Result Value Ref Range    WBC 6.1 4.8 - 10.8 K/uL    RBC 3.74 (L) 4.20 - 5.40 M/uL    Hemoglobin 10.4 (L) 12.0 - 16.0 g/dL    Hematocrit 31.4 (L) 37.0 - 47.0 %    MCV 84.0 82.0 - 100.0 fL    MCH 27.8 27.0 - 31.3 pg    MCHC 33.1 33.0 - 37.0 %    RDW 15.5 (H) 11.5 - 14.5 %    Platelets 928 033 - 432 K/uL   Magnesium    Collection Time: 08/03/22  5:18 AM   Result Value Ref Range    Magnesium 2.1 1.7 - 2.4 mg/dL   Renal Function Panel    Collection Time: 08/03/22  5:18 AM   Result Value Ref Range    Sodium 138 135 - 144 mEq/L    Potassium 3.7 3.4 - 4.9 mEq/L    Chloride 101 95 - 107 mEq/L    CO2 26 20 - 31 mEq/L    Anion Gap 11 9 - 15 mEq/L    Glucose 99 70 - 99 mg/dL    BUN 10 8 - 23 mg/dL    Creatinine 0.45 (L) 0.50 - 0.90 mg/dL    GFR Non-African American >60.0 >60    GFR  >60.0 >60    Calcium 8.7 8.5 - 9.9 mg/dL    Phosphorus 3.4 2.3 - 4.8 mg/dL    Albumin 3.0 (L) 3.5 - 4.6 g/dL     Previous extensive, complex labs, notes and diagnostics reviewed and analyzed. ALLERGIES:    Allergies as of 07/29/2022 - Fully Reviewed 07/29/2022   Allergen Reaction Noted    Latex  07/19/2017    Tape [adhesive tape]  06/28/2016      (please also verify by checking MAR)      I reviewed her [facility] prescription monitoring service data sheets in hopes of eliminating polypharmacy and weaning to the lowest effective dose of pain medications and eliminating the concomitant use of benzodiazepines. I see no medications of concern. I see no habits of combining sedatives and narcotics. Complex Physical Medicine & Rehab Issues Assess & Plan:   Severe abnormality of gait and mobility and impaired self-care and ADL's secondary to   Charcot-Arleen-Tooth neuropathy. Updated functional and medical status reassessed regarding patients ability to participate in therapies and patient found to be able to participate      acute intensive comprehensive inpatient rehabilitation program including PT/OT to improve balance, ambulation, ADLs, and to improve the P/AROM. It is my opinion that they will be able to tolerate 3 hours of therapy a day and benefit from it at an acute level. I again discussed acute rehab with the patient and verify that the patient is able and willing to participate in 3 hours of therapy a day. Rehab and Acute Care Case Management has also reinforced this expectation.   Will continue to follow to attempt to get patient to the most efficient but most effective level of care will be in their best interest.  Continue to focus on energy conservation heart

## 2022-08-04 LAB
ALBUMIN SERPL-MCNC: 3 G/DL (ref 3.5–4.6)
ANION GAP SERPL CALCULATED.3IONS-SCNC: 9 MEQ/L (ref 9–15)
BUN BLDV-MCNC: 14 MG/DL (ref 8–23)
CALCIUM SERPL-MCNC: 8.8 MG/DL (ref 8.5–9.9)
CHLORIDE BLD-SCNC: 105 MEQ/L (ref 95–107)
CO2: 27 MEQ/L (ref 20–31)
CREAT SERPL-MCNC: 0.56 MG/DL (ref 0.5–0.9)
GFR AFRICAN AMERICAN: >60
GFR NON-AFRICAN AMERICAN: >60
GLUCOSE BLD-MCNC: 110 MG/DL (ref 70–99)
HCT VFR BLD CALC: 30.4 % (ref 37–47)
HEMOGLOBIN: 10.1 G/DL (ref 12–16)
INR BLD: 1.1
MCH RBC QN AUTO: 27.9 PG (ref 27–31.3)
MCHC RBC AUTO-ENTMCNC: 33.3 % (ref 33–37)
MCV RBC AUTO: 83.9 FL (ref 82–100)
PDW BLD-RTO: 15.8 % (ref 11.5–14.5)
PHOSPHORUS: 3.8 MG/DL (ref 2.3–4.8)
PLATELET # BLD: 241 K/UL (ref 130–400)
POTASSIUM SERPL-SCNC: 3.9 MEQ/L (ref 3.4–4.9)
PROTHROMBIN TIME: 13.8 SEC (ref 12.3–14.9)
RBC # BLD: 3.62 M/UL (ref 4.2–5.4)
SODIUM BLD-SCNC: 141 MEQ/L (ref 135–144)
WBC # BLD: 6.2 K/UL (ref 4.8–10.8)

## 2022-08-04 PROCEDURE — 99223 1ST HOSP IP/OBS HIGH 75: CPT | Performed by: PHYSICAL MEDICINE & REHABILITATION

## 2022-08-04 PROCEDURE — 6370000000 HC RX 637 (ALT 250 FOR IP): Performed by: PHYSICAL MEDICINE & REHABILITATION

## 2022-08-04 PROCEDURE — 6370000000 HC RX 637 (ALT 250 FOR IP): Performed by: INTERNAL MEDICINE

## 2022-08-04 PROCEDURE — 97112 NEUROMUSCULAR REEDUCATION: CPT

## 2022-08-04 PROCEDURE — 2500000003 HC RX 250 WO HCPCS: Performed by: INTERNAL MEDICINE

## 2022-08-04 PROCEDURE — 6360000002 HC RX W HCPCS: Performed by: PHYSICAL MEDICINE & REHABILITATION

## 2022-08-04 PROCEDURE — 99223 1ST HOSP IP/OBS HIGH 75: CPT | Performed by: INTERNAL MEDICINE

## 2022-08-04 PROCEDURE — 85610 PROTHROMBIN TIME: CPT

## 2022-08-04 PROCEDURE — 1180000000 HC REHAB R&B

## 2022-08-04 PROCEDURE — 85027 COMPLETE CBC AUTOMATED: CPT

## 2022-08-04 PROCEDURE — 97162 PT EVAL MOD COMPLEX 30 MIN: CPT

## 2022-08-04 PROCEDURE — 6360000002 HC RX W HCPCS: Performed by: INTERNAL MEDICINE

## 2022-08-04 PROCEDURE — 2700000000 HC OXYGEN THERAPY PER DAY

## 2022-08-04 PROCEDURE — 97166 OT EVAL MOD COMPLEX 45 MIN: CPT

## 2022-08-04 PROCEDURE — 97530 THERAPEUTIC ACTIVITIES: CPT

## 2022-08-04 PROCEDURE — 80069 RENAL FUNCTION PANEL: CPT

## 2022-08-04 PROCEDURE — 36415 COLL VENOUS BLD VENIPUNCTURE: CPT

## 2022-08-04 PROCEDURE — 2580000003 HC RX 258: Performed by: INTERNAL MEDICINE

## 2022-08-04 PROCEDURE — 97535 SELF CARE MNGMENT TRAINING: CPT

## 2022-08-04 PROCEDURE — 97116 GAIT TRAINING THERAPY: CPT

## 2022-08-04 RX ORDER — WARFARIN SODIUM 2.5 MG/1
7.5 TABLET ORAL
Status: COMPLETED | OUTPATIENT
Start: 2022-08-04 | End: 2022-08-04

## 2022-08-04 RX ORDER — MECLIZINE HCL 12.5 MG/1
12.5 TABLET ORAL 3 TIMES DAILY PRN
Status: DISCONTINUED | OUTPATIENT
Start: 2022-08-04 | End: 2022-08-18 | Stop reason: HOSPADM

## 2022-08-04 RX ADMIN — FUROSEMIDE 20 MG: 20 TABLET ORAL at 08:14

## 2022-08-04 RX ADMIN — CARVEDILOL 6.25 MG: 6.25 TABLET, FILM COATED ORAL at 17:35

## 2022-08-04 RX ADMIN — LACTOBACILLUS TAB 2 TABLET: TAB at 20:50

## 2022-08-04 RX ADMIN — CEFTRIAXONE SODIUM 1000 MG: 1 INJECTION, POWDER, FOR SOLUTION INTRAMUSCULAR; INTRAVENOUS at 16:19

## 2022-08-04 RX ADMIN — MICONAZOLE NITRATE: 2 POWDER TOPICAL at 11:24

## 2022-08-04 RX ADMIN — LACTOBACILLUS TAB 2 TABLET: TAB at 08:14

## 2022-08-04 RX ADMIN — ENOXAPARIN SODIUM 70 MG: 100 INJECTION SUBCUTANEOUS at 08:21

## 2022-08-04 RX ADMIN — CARVEDILOL 6.25 MG: 6.25 TABLET, FILM COATED ORAL at 08:14

## 2022-08-04 RX ADMIN — CYANOCOBALAMIN 1000 MCG: 1000 INJECTION, SOLUTION INTRAMUSCULAR; SUBCUTANEOUS at 11:21

## 2022-08-04 RX ADMIN — Medication 2000 UNITS: at 17:36

## 2022-08-04 RX ADMIN — WARFARIN SODIUM 7.5 MG: 2.5 TABLET ORAL at 18:34

## 2022-08-04 RX ADMIN — ENOXAPARIN SODIUM 70 MG: 100 INJECTION SUBCUTANEOUS at 20:49

## 2022-08-04 RX ADMIN — CITALOPRAM HYDROBROMIDE 20 MG: 10 TABLET ORAL at 08:14

## 2022-08-04 RX ADMIN — Medication 10 ML: at 20:50

## 2022-08-04 RX ADMIN — LEVOTHYROXINE SODIUM 88 MCG: 88 TABLET ORAL at 08:14

## 2022-08-04 RX ADMIN — LACTOBACILLUS TAB 2 TABLET: TAB at 14:55

## 2022-08-04 RX ADMIN — PANTOPRAZOLE SODIUM 40 MG: 40 TABLET, DELAYED RELEASE ORAL at 06:04

## 2022-08-04 RX ADMIN — Medication 100 MG: at 08:14

## 2022-08-04 RX ADMIN — Medication 10 ML: at 11:21

## 2022-08-04 ASSESSMENT — ENCOUNTER SYMPTOMS
STRIDOR: 0
SHORTNESS OF BREATH: 0
NAUSEA: 0
CHEST TIGHTNESS: 0
GASTROINTESTINAL NEGATIVE: 1
RESPIRATORY NEGATIVE: 1
WHEEZING: 0
ABDOMINAL PAIN: 1
EYES NEGATIVE: 1
COUGH: 0
BLOOD IN STOOL: 0

## 2022-08-04 NOTE — CARE COORDINATION
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  [address] [facility] rehab admit 8/3/2022    CO MORBIDITIES:      Past Medical History:   Diagnosis Date    Ascending aortic aneurysm ([address] 2017    Charcot Arleen Tooth muscular atrophy     CHF (congestive heart failure) (HCC)     Chronic diastolic CHF (congestive heart failure) ([address] 2022    Depression     Descending aortic aneurysm ([address] 2017    Essential hypertension 2018    Headache     HTN (hypertension)     Impaired mobility and activities of daily living     Lumbar stenosis with neurogenic claudication     Myelopathy ([address]     Osteoarthritis      Past Surgical History:   Procedure Laterality Date    JOINT REPLACEMENT Bilateral     knees    OTHER SURGICAL HISTORY Right 2022    cat scan guided abdominal mass aspiration with drain placement by Dr. Kuldip Alvarado Left 2021    LEFT PLEURAL CATHETER INSERTION performed by Ayan Ambrosio MD at [place] [id number] Left 2021    total of 755 cc removed per Dr Joe Reid specimen sent to lab        Restrictions  Restrictions/Precautions: Fall Risk  Position Activity Restriction  Other position/activity restrictions: Abdominal drain  CASE MANAGEMENT    Social/Functional History  Social/Functional History  Lives With: Alone  Type of Home: House  Home Layout: One level  Home Access: Stairs to enter with rails  Entrance Stairs - Number of Steps: 2  Entrance Stairs - Rails: Both  Bathroom Shower/Tub: Tub/Shower unit  Bathroom Equipment: Grab bars in shower  Home Equipment: Farley Lundborg, quad  Has the patient had two or more falls in the past year or any fall with injury in the past year?: (Pt reports one fall in the last year)  Receives Help From: Family  ADL Assistance: Independent  Homemaking Assistance: Independent  Homemaking Responsibilities: Yes  Meal Prep Responsibility: Primary  Laundry Responsibility: Primary  Cleaning Responsibility: Primary  Shopping Responsibility: No (Son performs)  Ambulation Assistance: Independent ([address] and furniture walking in the home; Pt only wears AFO's when ambulating outside with QC. Inside she is always barefoot)  Transfer Assistance: Independent  Active : No  Patient's  Info: family - son lives close by; dtr lives in [place]  Additional Comments: [facility] information collected through chart review and dtr able to confirm. Pt declines teleinterpreter. Per pt's dtr, pt cannot hear the teleinterpreter and from previous experiences, the [facility] interpreters are unable to speak her particular dialect. Dtr reports that she would rather have her family present to clarify instructions. This writer discussed translation concerns with nursing manager who will investigate options other than family. Pts personal preferences: n/a    Pts assets/resources/support system: son and dtr in [facility], dtr in [place] [address] INFORMATION:Payor: [employer] / Plan: MEDICARE PART A AND B / Product Type: *No Product type* /       NURSING  No active isolations    Weight: 164 lb (74.4 kg) / Body mass index is 32.03 kg/m². ADULT DIET;  Regular    SpO2: 94 % (08/05/22 0758)  O2 Flow Rate (L/min): 2.5 L/min (08/04/22 9702)    Oxygen to be continued upon discharge: Yes  Home-going needs (nocturnal Pox, sleep study, RX, equipment): Yes    Skin Issues: Yes; cream for buttocks  Home-going needs (education, training, RX, Wound RN): Yes    Pain Managed: Yes    Bladder continence: Yes  Discharging with Taylor: No   Training done: No   Urology following: No   Plan/date to remove taylor: No   Bladder retraining started: No    Bowel continence:  Yes  Last BM date: 8/4/22  Need for bowel program: No    Anticoagulants: Coumadin, Lovenox  Home-going needs (Education, labs): Yes    Antibiotics: Rocephin IV  Stop date: TBD  Home-going needs (education, RX, PICC): Yes      Other: Intermittent cath after therapy, UA sent in this morning  AFOs      PHYSICAL THERAPY  Bed mobility:  Bed mobility  Bridging: Stand by assistance (08/04/22 1014)  Rolling to Left: Stand by assistance (08/05/22 1238)  Rolling to Right: Stand by assistance (08/05/22 1238)  Supine to Sit: Stand by assistance (08/05/22 1238)  Sit to Supine: Stand by assistance (08/05/22 1238)  Bed Mobility Comments: increased time and effort to complete all transitions. Hand over hand assist to complete each activity. (08/04/22 1014)  Supine to Sit  Assistance Level: Stand by assist (08/04/22 1312)  Scooting  Assistance Level: Stand by assist (08/04/22 1312)  Transfers:  Transfers  Sit to Stand: Stand by assistance (08/05/22 1444)  Stand to sit: Stand by assistance (08/05/22 1444)  Bed to Chair: Stand by assistance (08/05/22 1238)  Comment: VCs and demo for handplacement to improve safety and technique with fait follow through. (08/05/22 1444)  Sit to Stand  Assistance Level: Supervision (08/04/22 1312)  Stand to Sit  Assistance Level: Supervision (08/04/22 1312)  Stand Pivot  Assistance Level: Contact guard assist (08/04/22 1312)  Gait:   Ambulation  WB Status: wbat (08/05/22 1443)  Ambulation  Surface: carpet (08/05/22 1443)  Device: Rolling Walker (08/05/22 1443)  Other Apparatus: O2 (08/05/22 1443)  Assistance: Contact guard assistance;Minimal assistance (08/05/22 1124)  Quality of Gait: Assist for management of O2 line. (08/05/22 1443)  Gait Deviations: Slow Lea;Decreased step length (08/05/22 1443)  Distance: 50ftx2 (08/05/22 1443)  Ambulation  Surface: Carpet (08/04/22 1525)  Device: Rolling walker (08/04/22 1525)  Distance: 15' x 2, 25' with a turn each gait.  (08/04/22 1525)  Activity: Within Room (08/04/22 1525)  Additional Factors: Set-up; Verbal cues (08/04/22 1525)  Assistance Level: Contact guard assist (08/04/22 1525)  Gait Deviations: Slow raji;Decreased step length bilateral;Decreased heel strike right;Decreased heel strike left; Wide base of support (08/04/22 1525)  Skilled Clinical Factors: Pt with bilateral foot drop and not wearing AFO's this PM.  Pt goes barefoot at home. Pt without LOB with gait. (08/04/22 1525)  Stairs:  Stairs/Curb  Stairs?: Yes (08/05/22 1242)  Stairs  # Steps : 4 (08/05/22 1242)  Stairs Height: 6\" (08/05/22 1242)  Rails: Bilateral (08/05/22 1242)  Assistance: Contact guard assistance (08/05/22 1242)  Stairs  Stair Height: 6'' (08/04/22 1525)  Device: Bilateral handrails (08/04/22 1525)  Number of Stairs: 4 (08/04/22 1525)  Additional Factors: Set-up; Verbal cues; Non-reciprocal going up;Non-reciprocal going down (08/04/22 1525)  Assistance Level: Contact guard assist;Minimal assistance; Moderate assistance (08/04/22 1525)  Skilled Clinical Factors: Pt reuiring CGA with first two steps, but then Min A with third and Mod with fourth. Pt only CGA with descending forward. (08/04/22 1525)  W/C mobility:     LTG:  Long term goal 1: Pt to complete bed mobility with indep  Long term goal 2: Pt to complete transfers with indep  Long term goal 3: Pt to ambulate 50ft with LRD and indep  Long term goal 4: Pt to manage 2 steps with B HR and Supervision  Long term goal 5: Pt to complete TUG within 20sec to demonstrate improved functional mobility/balance  PT Treatment Time:  1.5 hrs      OCCUPATIONAL THERAPY    EVALUATION SELF CARE STATUS:  Hand Dominance: Right  Feeding: Setup (08/04/22 1114)  Grooming: Setup; Increased time to complete (08/04/22 1114)  UE Bathing: Stand by assistance; Increased time to complete (08/04/22 1114)  LE Bathing: Moderate assistance; Increased time to complete (08/04/22 1114)  UE Dressing: Contact guard assistance; Increased time to complete (08/04/22 1119)  LE Dressing: Maximum assistance; Increased time to complete (08/04/22 1114)  Toileting: Maximum assistance (08/04/22 1114)             CURRENT SELF CARE:  Grooming/Oral Hygiene  Assistance Level: Set-up (08/05/22 1348)  Upper Extremity Bathing  Assistance Level: Supervision (08/05/22 1348)  Skilled Clinical Factors: Patient completed sponge bath seated in w/c at bathrooom sink (08/05/22 1348)  Lower Extremity Bathing  Assistance Level: Minimal assistance (08/05/22 1348)  Skilled Clinical Factors: B feet - CGA for balance (08/05/22 1348)  Upper Extremity Dressing  Assistance Level: Set-up (08/05/22 1348)  Lower Extremity Dressing  Assistance Level: Moderate assistance (08/05/22 1348)  Skilled Clinical Factors: thread R LE x 1/1 - pull up x 1/2 (08/05/22 1348)  Putting On/Taking Off Footwear  Assistance Level: Dependent (08/05/22 1348)  Skilled Clinical Factors: B socks - shoes with braces (08/05/22 1348)        LTG:  Time Frame for Long term goals : 2 weeks  Long Term Goal 1: Pt within two weeks of evaluation will demonstrate progress to goals to address areas as stated below to increase functional independence for return to PLOF  Long Term Goal 2: Pt will increase independence with ADL Pt will increase independence with ADL transfers  Long Term Goal 3: Pt will increase bilateral UE strength to increase ease of ADL transfers     - Patient will complete self care as followed using the recommended adaptive equipment and/or adaptive techniques as instructed:  Feeding: Independent  Grooming: Independent  Bathing: Mod I  UE Dressing: Independent  LE Dressing: Mod I  Toileting: Independent  Toilet Transfer: Independent  Shower/Tub Transfer: Mod I  - Patient will sequence self-care routine with out and   verbal/tactile cues  - Patient will improve static and dynamic standing balance to complete pants management at standing level  - Patient will improve functional endurance to tolerate/complete 60 minutes of ADLs.   - Patient will improve bilateral UE strength and endurance to Good- in order to participate in self-care activities as projected. - Patient will perform kitchen mobility at device level without episodes of LOB and good safety awareness  - Patient will perform basic room mobility at least restrictive device level. - Patient and/or caregiver will demonstrate understanding of energy conservation techniques with out verbal/tactile cues. - Patient and/or caregiver will demonstrate understanding of recommended HEP for bilateral UE strength. OT Treatment Time: 1.5 hrs      SPEECH THERAPY                 Diet/Swallow:                       LTG:                COGNITION  OT: Cognition Comment: Comp Sup, expression Mod I, social Ind, problem min A, Memory Sup  SP:        RECREATIONAL THERAPY  Attendance to recreational therapy programs:    []  Pet Therapy  [] Music Therapy  [] Art Therapy    [] Recreation Therapy Group [] Support Group           Patient social interaction (mood, participation): good    Patient strengths: independent prior    Patients goal: return home alone    Problems/Barriers: lives alone, recommending for son to be there more often to supervise pt, pt does not want to use ww upon discharge even though team feels that is best device to maintain safety        1. Safety:          - Intervention / Plan:    [x]  falls protocol     [x]  PT/OT    []  SP        - Results:         2. Potential DME needs:         - Intervention / Plan:  [x]  PT/OT     [x]  Assess equipment needs/access       - Results:         3. Weakness:          - Intervention / Plan:  [x]  PT/OT      []  Other:         - Results:         4.   Discharge planning needs:          - Intervention / Plan:  [x]  Weekly team conference      [x]  family training        - Results:         5.            - Intervention / Plan:          - Results:         6.            - Intervention / Plan:         - Results:         7.            - Intervention / Plan:         - Results:           Discharge Plan   Estimated Length of Stay: TBD    Tentative Discharge date: 8/17/22      Anticipated Discharge Destination:  Home      Team recommendations:    1. Follow up Therapy :    PT  OT  RN  Social Work  [facility]    2. Home Health    Other:     Equipment needed at Discharge:  Other: TBD      Team Members Present at Conference:    Physician: Dr. Kenyetta Jackson Worker: KIN Quijano, JANETW  RN: Missy Goodwin RN  Physical Therapist: Kirk Wolf PT  Occupational Therapist: Crystal Ibarra OTR  Speech Therapist: Nicole Krause SLP      Electronically signed by KIN Quijano, JANETW on 8/5/2022 at 2:47 PM

## 2022-08-04 NOTE — CONSULTS
Inpatient consult to Cardiology  Consult performed by: Serg Kline MD  Consult ordered by: Bill Blanchard MD        Patient Name: Jesus Campos  Admit Date: 8/3/2022  1:38 PM  MR #: [id number]  : 1932    Attending Physician: Alonso Robert DO  Reason for consult: CAD    History of Presenting Illness:      Jesus Campos is a 80 y.o. female on hospital day 1 with a history of . History Obtained From:  patient, electronic medical record    Pt seen in Rehab unit. C/o dizziness form Vertigo. Daughter in room. Denies CP no SOB. Pt has right abd abscess/mass. S/p drainage. 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 Initially she was arranged for transfer to [facility] but pt and family declioned and wanted consevative management.    History:      EKG:  Past Medical History:   Diagnosis Date    Ascending aortic aneurysm ([address] 2017    Charcot Arleen Tooth muscular atrophy     CHF (congestive heart failure) (HCC)     Chronic diastolic CHF (congestive heart failure) ([address] 2022    Depression     Descending aortic aneurysm ([address] 2017    Essential hypertension 2018    Headache     HTN (hypertension)     Impaired mobility and activities of daily living     Lumbar stenosis with neurogenic claudication     Myelopathy ([address]     Osteoarthritis      Past Surgical History:   Procedure Laterality Date    JOINT REPLACEMENT Bilateral     knees    OTHER SURGICAL HISTORY Right 2022    cat scan guided abdominal mass aspiration with drain placement by Dr. Yuri Sanabria Left 2021    LEFT PLEURAL CATHETER INSERTION performed by Cliff Mathis MD at [place] [address] Left 2021    total of 755 cc removed per Dr Brennan Rey specimen sent to lab       Family History  Family History   Problem Relation Age of Onset    Arthritis Mother     Arthritis Father     High Blood Pressure Father      [] Unable to obtain due to ventilated and/ or neurologic status    Social History     Socioeconomic History    Marital status:       Spouse name: Not on file    Number of children: 2    Years of education: Not on file    Highest education level: Not on file   Occupational History    Not on file   Tobacco Use    Smoking status: Never    Smokeless tobacco: Never   Substance and Sexual Activity    Alcohol use: No     Alcohol/week: 0.0 standard drinks    Drug use: No    Sexual activity: Not on file   Other Topics Concern    Not on file   Social History Narrative    , Lives With: Alone, son lives down the street, dtr is in the area    Type of Home: [address]: One level    Home Access: Stairs to enter with rails- Number of Steps: 2- Rails: Both    Bathroom Shower/Tub: Tub/Shower unit, Bathroom Equipment: Grab bars in shower, Shower chair    Home Equipment: Rolling walker, Cane(Pt infrequemtly uses DME for ambulation and prefers to furniture walk in home)    ADL Assistance: Independent, [address]: [place] [address] Responsibilities: Yes    Ambulation Assistance: Independent, Transfer Assistance: Independent    Additional Comments: Son stops over 2 times daily         Social Determinants of Health     Financial Resource Strain: Not on file   Food Insecurity: Not on file   Transportation Needs: Not on file   Physical Activity: Not on file   Stress: Not on file   Social Connections: Not on file   Intimate Partner Violence: Not on file   Housing Stability: Not on file      [] Unable to obtain due to ventilated and/ or neurologic status      Home Medications:      Medications Prior to Admission: meclizine (ANTIVERT) 12.5 MG tablet, Take 12.5 mg by mouth 4 times daily as needed  potassium chloride (KLOR-CON M) 20 MEQ extended release tablet, Take 20 mEq by mouth daily (with breakfast)  digoxin (LANOXIN) 125 MCG tablet, TAKE 1 TABLET DAILY  furosemide (LASIX) 20 MG tablet, TAKE 1 TABLET DAILY  amLODIPine (NORVASC) 5 MG tablet, Take 1 tablet by mouth daily  pantoprazole (PROTONIX) 40 MG tablet, TAKE 1 TABLET DAILY  carvedilol (COREG) 6.25 MG tablet, TAKE 1 TABLET TWICE A DAY  XARELTO 15 MG TABS tablet, TAKE 1 TABLET DAILY  nitroGLYCERIN (NITROSTAT) 0.4 MG SL tablet, up to max of 3 total doses.  If no relief after 1 dose, call 911.  budesonide-formoterol (SYMBICORT) 160-4.5 MCG/ACT AERO, Inhale 2 puffs into the lungs 2 times daily  ferrous sulfate (IRON 325) 325 (65 Fe) MG tablet, Take 1 tablet by mouth 2 times daily (with meals)  Carboxymethylcellulose Sodium (EYE DROPS OP), Apply 1 drop to eye as needed (Dry eyes) Indications: Systane   Boswellia-Glucosamine-Vit D (OSTEO BI-FLEX ONE PER DAY) TABS, Take by mouth daily  Multiple Vitamins-Minerals (CENTRUM SILVER ULTRA WOMENS) TABS, Take by mouth daily  vitamin B-12 (CYANOCOBALAMIN) 1000 MCG tablet, Take 1,000 mcg by mouth daily  aspirin 81 MG tablet, Take 81 mg by mouth daily   citalopram (CELEXA) 20 MG tablet, Take 20 mg by mouth daily   SYNTHROID 88 MCG tablet, Take 88 mcg by mouth daily     Current Hospital Medications:     Scheduled Meds:   carvedilol  6.25 mg Oral BID WC    cefTRIAXone (ROCEPHIN) IV  1,000 mg IntraVENous Q24H    citalopram  20 mg Oral Daily    enoxaparin  1 mg/kg SubCUTAneous BID    [START ON 8/6/2022] ferrous sulfate  325 mg Oral Every Other Day    furosemide  20 mg Oral Daily    lactobacillus acidophilus  2 tablet Oral TID    levothyroxine  88 mcg Oral Daily    miconazole   Topical BID    pantoprazole  40 mg Oral QAM AC    sodium chloride flush  5-40 mL IntraVENous 2 times per day    warfarin placeholder: dosing by pharmacy   Other RX Placeholder    Vitamin D  2,000 Units Oral Dinner    cyanocobalamin  1,000 mcg IntraMUSCular Weekly    coenzyme Q10  100 mg Oral Daily    lidocaine  3 patch TransDERmal Daily     Continuous Infusions:   sodium chloride       PRN Meds:.meclizine, sodium chloride, HYDROcodone 5 mg - acetaminophen, which was not seen on prior study. Findings discussed with Dr. Christi Armendariz 3. Note is again made of an aortic aneurysm involving the ascending aorta, aortic arch, and descending aorta. Based on current images, the ascending aorta appears to be 2 mm larger when compared to study dating February 17, 2021. The ascending aorta now measures 5.5 cm. Previously the arch measured 5.3 cm. 4.Note is again made of left lower small pleural effusion with bilateral basilar opacities which may represent atelectasis. 5.Note is again made of a large cystic mass in the pelvis. 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 CT Dose-Length Product (estimate related to radiation exposure from this exam):  1386.22  mGy*cm. CT ABDOMEN: Bibasilar atelectasis with left small pleural effusion. Underlying nodules cannot be excluded. The heart is enlarged. The liver is of normal size and attenuation without focal lesions. The spleen is of normal size and attenuation without focal lesions. Pancreas shows no signs of focal mass or peripancreatic fluid collection. Kidneys are normal in size. There is no hydronephrosis. No renal mass is identified. Adrenal glands are unremarkable. Note is again made of a thoracoabdominal aortic aneurysm. The ascending aorta now measures 5.5 cm, this is larger when compared to prior study dating 5.3 cm. 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 This large thrombus was not seen on prior study. No significant retroperitoneal adenopathy or ascites is identified.  Again seen is a cystic lesion involving the proximal ascending aorta which now appears to be more septated than prior. The previously placed drain is now in the periphery of the lesion. CT PELVIS:  A large cystic mass is again seen in the lower pelvis. CT ABDOMEN PELVIS WO CONTRAST Additional Contrast? Oral    Result Date: 7/18/2022  CT of the Abdomen and Pelvis without intravenous contrast medium History:  Approximate 1 week history of abdominal pain Technical Factors: CT imaging of the abdomen and pelvis were obtained and formatted as 5 mm contiguous axial images from the domes of the diaphragm to the symphysis pubis. Sagittal and coronal reconstructions were also obtained. Oral contrast medium: Barium sulfate, 450 mL. Intravenous contrast medium:  None. Comparison:  CT abdomen pelvis, July 18, 2022, 1444 hours, February 8, 2021. Findings: Residual intravenous contrast medium from recent CT examination identified. Lungs:  Lung bases clear. Cardiac size enlarged, unchanged. Calcification at level of mitral valve. Small hiatal hernia. Liver:  Normal in size, shape, and attenuation. Bile Ducts:  Normal in caliber. Gallbladder:  No stones or wall thickening. Pancreas:  Normal without masses, cysts, ductal dilatation or calcification. Spleen:  Normal in size without masses or calcifications. No splenules. Kidneys:  Normal in size. No hydronephrosis, masses, or stones. Adrenals:  Normal. Small bowel:  Normal in caliber. See \"peritoneum \". Appendix:  Not visualized. Colon:  Normal in caliber. See \"peritoneum \". Peritoneum:  No free air. A bilobed, 10.0 x 8.0 x 10.6 cm mass having central rounded septated areas of low attenuation, 3.7 x 3.5 x 5.4 cm, and displacing cecum and ascending colon anteriorly (series 2, image 50, series 601, image 54, series 602, image 36). No calcification identified. Trace adjacent fat stranding demonstrated. Vessels: Aorta normal in course and caliber. Lymph nodes:  Retroperitoneal:  No enlarged retroperitoneal lymph nodes. Mesenteric:  No enlarged mesenteric lymph nodes. Pelvic: No enlarged pelvic lymph nodes. Ureters: Normal in course and caliber. No calcifications. Bladder: No wall thickening. Reproductive organs: 8.0 x 5.8 x 7.5 cm left adnexal cyst displacing urinary bladder right and laterally, anteriorly, and inferiorly (series 2, image 68, series 601, image 42, series 602, image 52). Abdominal Wall: Fat identified bilateral inguinal canals. Bones:  No bone lesions. Multilevel disc space narrowing lumbar spine. No post operative changes. Complex 10.0 x 8.0 x 10.6 cm mass with septated central low attenuation fluid collections as discussed. Mass displaces cecum and ascending colon anteriorly. Differential includes malignancy including appendiceal mucocele/carcinoma, as well as retroperitoneal sarcoma with central necrotic change. Infection/abscess secondary consideration, minimal presence of adjacent inflammatory change. 8.0 x 5.8 x 7.5 cm left adnexal cysts as discussed, most likely ovarian in etiology. If clinical concern warrants, pelvic sonography may be obtained for further evaluation. Other findings discussed. All CT scans at this facility use dose modulation, iterative reconstruction, and/or weight based dosing when appropriate to reduce radiation dose to as low as reasonably achievable. CT Head WO Contrast    Result Date: 7/18/2022  CT OF THE BRAIN WITHOUT CONTRAST HISTORY: Damaso Hernandez is a Female of 80 years age with history of headache and dizziness. COMPARISON: July 19, 2017 TECHNIQUE:  Spiral CT examination of the brain was performed without intravenous contrast.  Images were obtained from the base of the skull up to the convexities in axial slices, and then examined in the blood, brain parenchymal and bony windows. FINDINGS:  No acute changes are noted. There is no evidence for mass, mass effect or midline shift. No abnormal extra-axial fluid collections are appreciated.   Age-appropriate cortical volume loss is seen. There are patchy areas of hypoattenuation in a sub-cortical, central white and periventricular distribution consistent with nonspecific ischemic small vessel disease. Calcifications are again seen in the basal ganglia. There are no acute parenchymal alterations, blood byproducts or abnormal extracerebral fluid. The calvarium is grossly intact. The visualized paranasal sinuses show mucoceles or polyps in the maxillary sinuses. Mild fluid is seen in the mastoid air cells that is not significantly changed and is consistent with chronic changes. Elder Amend 1.Age-appropriate cortical atrophy and periventricular microangiopathy. When compared to previous study there has been no significant interval change. 2. No acute hemorrhage or extra-axial fluid collection All CT scans at this facility use dose modulation, iterative reconstruction, and/or weight based dosing when appropriate to reduce radiation dose to as low as reasonably achievable. CT ABSCESS DRAINAGE W CATH PLACEMENT S&I    1. Successful drainage and drain placement of right colonic soft tissue mass with central fluid collection. 2.Fluid was aspirated and sent for microbiology and cytology analysis. Additionally a 10 Northern Irish drain was placed yielding a thick red to yellow fluid with internal yellow debris. HISTORY: Rosealee Ormond is a Female of 80 years age. DIAGNOSIS:   Right abdominal mass with possible fluid collection vs necrotic tissue COMPARISON: None available. CT Dose-Length Product (estimate related to radiation exposure from this exam):  541.6  mGy*cm. PROCEDURE: Following the discussion of the procedure, alternatives, risks versus benefits, informed consent was obtained from the patient. Specifically, risks of after-biopsy pain at the site, rare possibility of excessive hemorrhage, infection, injury to the adjacent organs were discussed and the patient verbalized understanding.  Pre-procedure evaluation confirmed that the patient was an appropriate candidate for conscious sedation. Adequate sedation was maintained during the entire procedure. Vital signs, pulse oximetry, and response to verbal commands were monitored and recorded by the nurse throughout the procedure and the recovery period. Medical information was entered in the medical record including the medications and dosages used. The patient returned to baseline neurologic and physiologic status prior to leaving the department. No immediate sedation related complications were noted. Medication for conscious sedation was administered via IV route. 45 minutes of conscious sedation was provided. Following universal protocol, patient and site verification was performed with a \"timeout\" prior to the procedure. The patient was placed on the CT table in supine  position and the right lateral abdomen area was prepped and draped in usual sterile fashion. Using the usual sterile conditions, lidocaine and CT guidance, the fluid collection within soft tissue density  was accessed using a Yueh needle. After confirmation of appropriate localization of the needle, aspiration yielded a thick red to yellow fluid which was sent for microbiology and cytology analysis. Following that an Amplatz wire was placed through the Yueh sheath into the abscess under CT guidance. The tract was serially dilated with 6, 8, and 10 [place] dilators respectively. Following that a 10 Bengali drainage catheter was advanced over the wire into the abscess under CT guidance and the anchoring loop was formed. Catheter was sutured to the skin and and secured with Percufix device. The patient tolerated the procedure well. No immediate complications identified. The patient left the CT suite in supine position to the recovery room in stable condition. Total local anesthetic used: lidocaine, approximately 15 mL. Estimated blood loss:  None. CT GUIDED NEEDLE PLACEMENT    1. Successful drainage and drain placement of right colonic soft tissue mass with central fluid collection. 2.Fluid was aspirated and sent for microbiology and cytology analysis. Additionally a 10 Macedonian drain was placed yielding a thick red to yellow fluid with internal yellow debris. HISTORY: Rafa Palacios is a Female of 80 years age. DIAGNOSIS:   Right abdominal mass with possible fluid collection vs necrotic tissue COMPARISON: None available. CT Dose-Length Product (estimate related to radiation exposure from this exam):  541.6  mGy*cm. PROCEDURE: Following the discussion of the procedure, alternatives, risks versus benefits, informed consent was obtained from the patient. Specifically, risks of after-biopsy pain at the site, rare possibility of excessive hemorrhage, infection, injury to the adjacent organs were discussed and the patient verbalized understanding. Pre-procedure evaluation confirmed that the patient was an appropriate candidate for conscious sedation. Adequate sedation was maintained during the entire procedure. Vital signs, pulse oximetry, and response to verbal commands were monitored and recorded by the nurse throughout the procedure and the recovery period. Medical information was entered in the medical record including the medications and dosages used. The patient returned to baseline neurologic and physiologic status prior to leaving the department. No immediate sedation related complications were noted. Medication for conscious sedation was administered via IV route. 45 minutes of conscious sedation was provided. Following universal protocol, patient and site verification was performed with a \"timeout\" prior to the procedure. The patient was placed on the CT table in supine  position and the right lateral abdomen area was prepped and draped in usual sterile fashion. Using the usual sterile conditions, lidocaine and CT guidance, the fluid collection within soft tissue density  was accessed using a Yueh needle.   After confirmation of appropriate localization of the needle, aspiration yielded a thick red to yellow fluid which was sent for microbiology and cytology analysis. Following that an Amplatz wire was placed through the Yueh sheath into the abscess under CT guidance. The tract was serially dilated with 6, 8, and 10 [place] dilators respectively. Following that a 10 Romanian drainage catheter was advanced over the wire into the abscess under CT guidance and the anchoring loop was formed. Catheter was sutured to the skin and and secured with Percufix device. The patient tolerated the procedure well. No immediate complications identified. The patient left the CT suite in supine position to the recovery room in stable condition. Total local anesthetic used: lidocaine, approximately 15 mL. Estimated blood loss:  None. CT ABDOMEN PELVIS W IV CONTRAST Additional Contrast? None    Result Date: 7/18/2022  Comparison: February 8, 2021 History: Right lower quadrant abdominal pain  Technique: CT ABDOMEN PELVIS W IV CONTRAST Helically Acquired CT of the  abdomen and pelvis was performed during the intravenous infusion of 100 mL of Isovue-370. Axial delayed images were also performed. Reformatted sagittal and coronal images were also  obtained. Findings: The visualized bases of the lungs shows no consolidating pneumonia or pleural effusion. There is atelectasis seen in the bases. The thoracic aorta and visualized portion is tortuous and mildly dilated at 4 cm in greatest transverse diameter which is stable. . In the right lower quadrant of the abdomen there are multiple bowel loops seen that are peripheral to hypodense collections which could be from fluid or proteinaceous collections such as abscess. This the loops of bowel appear to be a ascending colon and  ileum. The largest walled off collections measure 4.5 x 3.8 x 3.3 cm and 4.4 x 3.4 x 4.2 cm. There is pericolic stranding seen. The left colon is unremarkable.  A cystic structure is again seen in the pelvis that measures 6.7 x 8.6 x 9.8 cm. A small amount of free fluid is seen in the posterior pelvis. Bilateral inguinal hernias are seen. The hernia on the left contains fat. The hernia on the right contains a loop of small bowel. No bowel obstruction is seen. The liver is decreased in attenuation and is enlarged. The spleen, pancreas and gallbladder and adrenal glands are unremarkable. Bilaterally both kidneys show uptake of contrast with no evidence for hydronephrosis or renal calculi. Prominent atherosclerotic calcifications are seen. The bladder is partially decompressed. The bladder wall however also appears to be thickened. Degenerative changes are seen in the spine at multiple levels. Intervertebral disc space narrowing is seen at L1 to. Slight anterolisthesis of L4 on L5 is noted. There is vacuum anomaly seen at all levels in the lumbar spine. Impression: 1. In the right lower quadrant of the abdomen, pericolic abscesses versus pericolic mass is seen. This is thought more likely abscess. 2. A cystic mass is again seen in the pelvis and is likely in the ovary. It is unchanged from previous examination The patient will be given oral contrast and rescanned to evaluate the right lower quadrant.   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 [Z74.09, Z78.9]      Priority: High    Lumbar stenosis with neurogenic claudication [M48.062] 06/02/2016     Priority: Medium    Chronic diastolic CHF (congestive heart failure) ([facility] [I50.32] 04/01/2022     Priority: Low    Acute on chronic combined systolic and diastolic CHF (congestive heart failure) ([address] [I50.43] 01/25/2021     Priority: Low    Descending aortic aneurysm ([facility] [I71.9] 06/23/2017     Priority: Low Impression/Plan:   Abd Abscess/Mass - s/p Drainage  LVEF 60  Symptoms of Orthopnea- lungs are clear no JVD nor peripheral edema. - will give Empiric low dose Lasix. Will need periodic Lab at Rehab  Frequent PAF - rate is controlled on exam.  CAD- moderate - no angina. 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 Xarelto stopped and started Heparin. Had long discussion with pt and daughter. They no longer want aggressive Rx and does not want to be transferred to [facility]. Conitnue Lovenox until INR therapeutic. Daily INR       Thank you for allowing us to participate in the care of this patient. Will continue to follow. Please call if questions or concerns arise.     Electronically signed by Jay Hadley MD on 8/4/2022 at 12:52 PM

## 2022-08-04 NOTE — PROGRESS NOTES
Warfarin Dosing - Pharmacy Consult Note  Consulting Provider: Dr Chelsi Pedersen  Indication:   thrombus  Warfarin Dose prior to admission: n/a   Concurrent anticoagulants/antiplatelets: n/a  Significant Drug Interactions: Celexa, Lovenox bridging  Recent Labs     08/02/22  0527 08/02/22  1022 08/03/22  0518 08/04/22  0328 08/04/22  1645   INR  --  1.2 1.1  --  1.1   HGB 10.3*  --  10.4* 10.1*  --      --  245 241  --    LABALBU 3.1*  --  3.0* 3.0*  --      Recent warfarin administrations                     warfarin (COUMADIN) tablet 5 mg (mg) 5 mg Given 08/03/22 1622    warfarin (COUMADIN) tablet 5 mg (mg) 5 mg Given 08/02/22 1224                Date      INR       Dose  08/02/22   1.2       5 mg  08/03/22   1.1       5 mg  08/04/22   1.1    7.5 mg    Assessment/Plan  (Goal INR: 2 - 3)  INR remains below goal, = 1.1 today. Will increase to warfarin 7.5 mg x1. Continue Lovenox bridging. Active problem list reviewed. INR orders are placed. Chart reviewed for pertinent labs, drug/diet interactions, and past doses. Documentation of patient's clinical condition was reviewed. Pharmacy Dosing:  Pharmacy will continue to follow.         Arcadio Murray [facility]  08/04/22  6:15 PM

## 2022-08-04 NOTE — H&P
HISTORY & PHYSICAL       DATE OF ADMISSION:  8/3/2022    DATE OF SERVICE:  8/4/22    Subjective:    Zulay Rodriguez, 80 y.o. female presents today with:     CHIEF COMPLAINT:  Patient was initially admitted through the ER at [facility] complaining of abdominal pain for the past 5 to 7 days. She also complained of headache and dizziness. Imaging revealed an abdominal mass felt to be either cancer or an abscess. Imaging was thinking it was more like an abscess. She was admitted to [facility] placed on antibiotics and a drain was placed under interventional radiology's guidance. She was transferred off the rehab unit because of findings on a CT abdomen which showed aneurysm and clots however she was felt to be nonoperative given her age and complex medical status. Her family does not want to pursue aggressive treatment and she is very glad to be back on the rehabilitation unit and is medically stable and ready to participate. She still has her abdominal drain and I discussed with them that interventional radiology who had been under the impression the patient was going to the [facility] for further operative care. Now that she is not we will touch base with general surgery to see if they would be comfortable with this getting it out. Her goals are to go to acute rehabilitation and return home in the care of her daughter after a 10 to 14-day length of stay discharge independent supervised level with home health care. Abdominal Pain  This is a recurrent problem. The current episode started 1 to 4 weeks ago. The pain is located in the epigastric region, right flank and RLQ. The pain is at a severity of 9/10. The quality of the pain is dull, aching and colicky. Associated symptoms include anorexia, arthralgias, a fever and myalgias. The pain is aggravated by certain positions. The pain is relieved by Bowel movements and certain positions.  Prior diagnostic workup includes GI consult, CT scan, surgery and ultrasound. Fatigue  This is a recurrent problem. The current episode started 1 to 4 weeks ago. The problem has been gradually improving. Associated symptoms include abdominal pain, anorexia, arthralgias, fatigue, a fever, myalgias, numbness and weakness. The symptoms are aggravated by walking. She has tried rest and heat for the symptoms. The treatment provided mild relief. I reviewed recent nursing note, \" Pt in bed sleeping tonight. Awakens easily. Denied any pain. Drain to right upper quad intact and draining small amount of serosanguinous drainage. Dressing to RUQ intact with small amount of tan drainage. Last BM was 8/2/22. \". The patient has stabilized medically and is able to participate at acute level rehab but is too medically complex for SNF due to need for therapy at the acute level with at least 15 hours a week of PT OT and cognitive and recreational therapy at an acute level with daily medical monitoring. Imaging:  Imaging and other studies reviewed and discussed with patient and staff    CT ABDOMEN PELVIS   7/29/2022  1. The previously placed abscess drain in the right lower quadrant appears to be in the periphery of the abscess with loculations now more apparent in the fluid collection. The fluid collection appears to be grossly the same size as prior to drainage. 2.New mural thrombus is noted in the distal aortic arch extending to the lower thoracic aorta which was not seen on prior study. Findings discussed with Dr. Christopher Camarena 3. Note is again made of an aortic aneurysm involving the ascending aorta, aortic arch, and descending aorta. Based on current images, the ascending aorta appears to be 2 mm larger when compared to study dating February 17, 2021. The ascending aorta now measures 5.5 cm. Previously the arch measured 5.3 cm. 4.Note is again made of left lower small pleural effusion with bilateral basilar opacities which may represent atelectasis.  5.Note is again made of a large cystic mass in the pelvis. 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 CT Dose-Length Product (estimate related to radiation exposure from this exam):  1386.22  mGy*cm. CT ABDOMEN: Bibasilar atelectasis with left small pleural effusion. Underlying nodules cannot be excluded. The heart is enlarged. The liver is of normal size and attenuation without focal lesions. The spleen is of normal size and attenuation without focal lesions. Pancreas shows no signs of focal mass or peripancreatic fluid collection. Kidneys are normal in size. There is no hydronephrosis. No renal mass is identified. Adrenal glands are unremarkable. Note is again made of a thoracoabdominal aortic aneurysm. The ascending aorta now measures 5.5 cm, this is larger when compared to prior study dating 5.3 cm. 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 This large thrombus was not seen on prior study. No significant retroperitoneal adenopathy or ascites is identified. Again seen is a cystic lesion involving the proximal ascending aorta which now appears to be more septated than prior. The previously placed drain is now in the periphery of the lesion. CT PELVIS:  A large cystic mass is again seen in the lower pelvis.      CT ABDOMEN PELVIS  7/18/2022  CT of the Abdomen and Pelvis without intravenous contrast medium History:  Approximate 1 week history of abdominal pain Technical Factors: CT imaging of the abdomen and pelvis were obtained and formatted as 5 mm contiguous axial images from the domes of the diaphragm to the symphysis pubis. Sagittal and coronal reconstructions were also obtained. Oral contrast medium: Barium sulfate, 450 mL. Intravenous contrast medium:  None. Comparison:  CT abdomen pelvis, July 18, 2022, 1444 hours, February 8, 2021. Findings: Residual intravenous contrast medium from recent CT examination identified. Lungs:  Lung bases clear. Cardiac size enlarged, unchanged. Calcification at level of mitral valve. Small hiatal hernia. Liver:  Normal in size, shape, and attenuation. Bile Ducts:  Normal in caliber. Gallbladder:  No stones or wall thickening. Pancreas:  Normal without masses, cysts, ductal dilatation or calcification. Spleen:  Normal in size without masses or calcifications. No splenules. Kidneys:  Normal in size. No hydronephrosis, masses, or stones. Adrenals:  Normal. Small bowel:  Normal in caliber. See \"peritoneum \". Appendix:  Not visualized. Colon:  Normal in caliber. See \"peritoneum \". Peritoneum:  No free air. A bilobed, 10.0 x 8.0 x 10.6 cm mass having central rounded septated areas of low attenuation, 3.7 x 3.5 x 5.4 cm, and displacing cecum and ascending colon anteriorly (series 2, image 50, series 601, image 54, series 602, image 36). No calcification identified. Trace adjacent fat stranding demonstrated. Vessels: Aorta normal in course and caliber. Lymph nodes:  Retroperitoneal:  No enlarged retroperitoneal lymph nodes. Mesenteric:  No enlarged mesenteric lymph nodes. Pelvic: No enlarged pelvic lymph nodes. Ureters: Normal in course and caliber. No calcifications. Bladder: No wall thickening. Reproductive organs: 8.0 x 5.8 x 7.5 cm left adnexal cyst displacing urinary bladder right and laterally, anteriorly, and inferiorly (series 2, image 68, series 601, image 42, series 602, image 52). Abdominal Wall: Fat identified bilateral inguinal canals. Bones:  No bone lesions. Multilevel disc space narrowing lumbar spine. No post operative changes.      Complex 10.0 x 8.0 x 10.6 cm mass with septated central low attenuation fluid collections as discussed. Mass displaces cecum and ascending colon anteriorly. Differential includes malignancy including appendiceal mucocele/carcinoma, as well as retroperitoneal sarcoma with central necrotic change. Infection/abscess secondary consideration, minimal presence of adjacent inflammatory change. 8.0 x 5.8 x 7.5 cm left adnexal cysts as discussed, most likely ovarian in etiology. If clinical concern warrants, pelvic sonography may be obtained for further evaluation. Other findings discussed. CT Head  7/18/2022  CT OF THE BRAIN WITHOUT CONTRAST HISTORY: Olena Mercer is a Female of 80 years age with history of headache and dizziness. COMPARISON: July 19, 2017 TECHNIQUE:  Spiral CT examination of the brain was performed without intravenous contrast.  Images were obtained from the base of the skull up to the convexities in axial slices, and then examined in the blood, brain parenchymal and bony windows. FINDINGS:  No acute changes are noted. There is no evidence for mass, mass effect or midline shift. No abnormal extra-axial fluid collections are appreciated. Age-appropriate cortical volume loss is seen. There are patchy areas of hypoattenuation in a sub-cortical, central white and periventricular distribution consistent with nonspecific ischemic small vessel disease. Calcifications are again seen in the basal ganglia. There are no acute parenchymal alterations, blood byproducts or abnormal extracerebral fluid. The calvarium is grossly intact. The visualized paranasal sinuses show mucoceles or polyps in the maxillary sinuses. Mild fluid is seen in the mastoid air cells that is not significantly changed and is consistent with chronic changes. Jess Wells 1.Age-appropriate cortical atrophy and periventricular microangiopathy. When compared to previous study there has been no significant interval change.  2. No acute hemorrhage or extra-axial fluid collection All CT scans at this facility use dose modulation, iterative reconstruction, and/or weight based dosing when appropriate to reduce radiation dose to as low as reasonably achievable. CT ABSCESS DRAINAGE W CATH PLACEMENT S&I    1. Successful drainage and drain placement of right colonic soft tissue mass with central fluid collection. 2.Fluid was aspirated and sent for microbiology and cytology analysis. Additionally a 10 Vietnamese drain was placed yielding a thick red to yellow fluid with internal yellow debris. HISTORY: Rena Wolf is a Female of 80 years age. DIAGNOSIS:   Right abdominal mass with possible fluid collection vs necrotic tissue COMPARISON: None available. CT Dose-Length Product (estimate related to radiation exposure from this exam):  541.6  mGy*cm. PROCEDURE: Following the discussion of the procedure, alternatives, risks versus benefits, informed consent was obtained from the patient. Specifically, risks of after-biopsy pain at the site, rare possibility of excessive hemorrhage, infection, injury to the adjacent organs were discussed and the patient verbalized understanding. Pre-procedure evaluation confirmed that the patient was an appropriate candidate for conscious sedation. Adequate sedation was maintained during the entire procedure. Vital signs, pulse oximetry, and response to verbal commands were monitored and recorded by the nurse throughout the procedure and the recovery period. Medical information was entered in the medical record including the medications and dosages used. The patient returned to baseline neurologic and physiologic status prior to leaving the department. No immediate sedation related complications were noted. Medication for conscious sedation was administered via IV route. 45 minutes of conscious sedation was provided. Following universal protocol, patient and site verification was performed with a \"timeout\" prior to the procedure.  The patient was placed on the CT table in supine  position and the right lateral abdomen area was prepped and draped in usual sterile fashion. Using the usual sterile conditions, lidocaine and CT guidance, the fluid collection within soft tissue density  was accessed using a Yueh needle. After confirmation of appropriate localization of the needle, aspiration yielded a thick red to yellow fluid which was sent for microbiology and cytology analysis. Following that an Amplatz wire was placed through the Yueh sheath into the abscess under CT guidance. The tract was serially dilated with 6, 8, and 10 [place] dilators respectively. Following that a 10 Ethiopian drainage catheter was advanced over the wire into the abscess under CT guidance and the anchoring loop was formed. Catheter was sutured to the skin and and secured with Percufix device. The patient tolerated the procedure well. No immediate complications identified. The patient left the CT suite in supine position to the recovery room in stable condition. Total local anesthetic used: lidocaine, approximately 15 mL. Estimated blood loss:  None. CT GUIDED NEEDLE PLACEMENT    1. Successful drainage and drain placement of right colonic soft tissue mass with central fluid collection. 2.Fluid was aspirated and sent for microbiology and cytology analysis. Additionally a 10 Ethiopian drain was placed yielding a thick red to yellow fluid with internal yellow debris. HISTORY: Olena Mercer is a Female of 80 years age. DIAGNOSIS:   Right abdominal mass with possible fluid collection vs necrotic tissue COMPARISON: None available. CT Dose-Length Product (estimate related to radiation exposure from this exam):  541.6  mGy*cm. PROCEDURE: Following the discussion of the procedure, alternatives, risks versus benefits, informed consent was obtained from the patient.   Specifically, risks of after-biopsy pain at the site, rare possibility of excessive hemorrhage, infection, injury to the adjacent organs were discussed and the patient verbalized understanding. Pre-procedure evaluation confirmed that the patient was an appropriate candidate for conscious sedation. Adequate sedation was maintained during the entire procedure. Vital signs, pulse oximetry, and response to verbal commands were monitored and recorded by the nurse throughout the procedure and the recovery period. Medical information was entered in the medical record including the medications and dosages used. The patient returned to baseline neurologic and physiologic status prior to leaving the department. No immediate sedation related complications were noted. Medication for conscious sedation was administered via IV route. 45 minutes of conscious sedation was provided. Following universal protocol, patient and site verification was performed with a \"timeout\" prior to the procedure. The patient was placed on the CT table in supine  position and the right lateral abdomen area was prepped and draped in usual sterile fashion. Using the usual sterile conditions, lidocaine and CT guidance, the fluid collection within soft tissue density  was accessed using a Yueh needle. After confirmation of appropriate localization of the needle, aspiration yielded a thick red to yellow fluid which was sent for microbiology and cytology analysis. Following that an Amplatz wire was placed through the Yueh sheath into the abscess under CT guidance. The tract was serially dilated with 6, 8, and 10 [place] dilators respectively. Following that a 10 Belarusian drainage catheter was advanced over the wire into the abscess under CT guidance and the anchoring loop was formed. Catheter was sutured to the skin and and secured with Percufix device. The patient tolerated the procedure well. No immediate complications identified. The patient left the CT suite in supine position to the recovery room in stable condition. Total local anesthetic used: lidocaine, approximately 15 mL. Estimated blood loss:  None. CT ABDOMEN PELVIS W IV CONTRAST Additional Contrast? None    Result Date: 7/18/2022  Comparison: February 8, 2021 History: Right lower quadrant abdominal pain  Technique: CT ABDOMEN PELVIS W IV CONTRAST Helically Acquired CT of the  abdomen and pelvis was performed during the intravenous infusion of 100 mL of Isovue-370. Axial delayed images were also performed. Reformatted sagittal and coronal images were also  obtained. Findings: The visualized bases of the lungs shows no consolidating pneumonia or pleural effusion. There is atelectasis seen in the bases. The thoracic aorta and visualized portion is tortuous and mildly dilated at 4 cm in greatest transverse diameter which is stable. . In the right lower quadrant of the abdomen there are multiple bowel loops seen that are peripheral to hypodense collections which could be from fluid or proteinaceous collections such as abscess. This the loops of bowel appear to be a ascending colon and  ileum. The largest walled off collections measure 4.5 x 3.8 x 3.3 cm and 4.4 x 3.4 x 4.2 cm. There is pericolic stranding seen. The left colon is unremarkable. A cystic structure is again seen in the pelvis that measures 6.7 x 8.6 x 9.8 cm. A small amount of free fluid is seen in the posterior pelvis. Bilateral inguinal hernias are seen. The hernia on the left contains fat. The hernia on the right contains a loop of small bowel. No bowel obstruction is seen. The liver is decreased in attenuation and is enlarged. The spleen, pancreas and gallbladder and adrenal glands are unremarkable. Bilaterally both kidneys show uptake of contrast with no evidence for hydronephrosis or renal calculi. Prominent atherosclerotic calcifications are seen. The bladder is partially decompressed. The bladder wall however also appears to be thickened. Degenerative changes are seen in the spine at multiple levels. Intervertebral disc space narrowing is seen at L1 to.  Slight anterolisthesis of L4 on L5 is noted. There is vacuum anomaly seen at all levels in the lumbar spine. Impression: 1. In the right lower quadrant of the abdomen, pericolic abscesses versus pericolic mass is seen. This is thought more likely abscess. 2. A cystic mass is again seen in the pelvis and is likely in the ovary. It is unchanged from previous examination The patient will be given oral contrast and rescanned to evaluate the right lower quadrant.   All CT scans at this facility use dose modulation, iterative reconstruction, and/or weight based dosing            Labs:    Labs reviewed and discussed with patient and staff    Lab Results   Component Value Date/Time    POCGLU 113 02/06/2021 04:16 PM    POCGLU 134 02/06/2021 11:21 AM     Lab Results   Component Value Date/Time     08/04/2022 03:28 AM    K 3.9 08/04/2022 03:28 AM    K 3.2 07/24/2022 05:00 AM     08/04/2022 03:28 AM    CO2 27 08/04/2022 03:28 AM    BUN 14 08/04/2022 03:28 AM    CREATININE 0.56 08/04/2022 03:28 AM    CALCIUM 8.8 08/04/2022 03:28 AM    LABALBU 3.0 08/04/2022 03:28 AM    BILITOT 0.3 07/24/2022 05:00 AM    ALKPHOS 51 07/24/2022 05:00 AM    AST 20 07/24/2022 05:00 AM    ALT 19 07/24/2022 05:00 AM     Lab Results   Component Value Date/Time    WBC 6.2 08/04/2022 03:28 AM    RBC 3.62 08/04/2022 03:28 AM    HGB 10.1 08/04/2022 03:28 AM    HCT 30.4 08/04/2022 03:28 AM    MCV 83.9 08/04/2022 03:28 AM    MCH 27.9 08/04/2022 03:28 AM    MCHC 33.3 08/04/2022 03:28 AM    RDW 15.8 08/04/2022 03:28 AM     08/04/2022 03:28 AM     Lab Results   Component Value Date/Time    VITD25 56.0 10/02/2020 11:59 AM     Lab Results   Component Value Date/Time    COLORU Yellow 07/18/2022 12:30 PM    NITRU Negative 07/18/2022 12:30 PM    GLUCOSEU Negative 07/18/2022 12:30 PM    KETUA TRACE 07/18/2022 12:30 PM    UROBILINOGEN 0.2 07/18/2022 12:30 PM    BILIRUBINUR Negative 07/18/2022 12:30 PM     Lab Results   Component Value Date/Time    PROTIME 14.6 08/03/2022 05:18 AM     Lab Results   Component Value Date/Time    INR 1.1 08/03/2022 05:18 AM           The patient remains highly medically complex and continues to have severe problems with activities of daily living and mobility. The patient was assessed to be able to tolerate intensive rehabilitation and therefore was admitted to Rehabilitation to address these needs. The patient has been found to have severe abnormality of gait and mobility with impaired self care and is admitted to the acute inpatient rehab program.       Prior Function; everyday activities:     Social History     Socioeconomic History    Marital status:       Spouse name: Not on file    Number of children: 2    Years of education: Not on file    Highest education level: Not on file   Occupational History    Not on file   Tobacco Use    Smoking status: Never    Smokeless tobacco: Never   Substance and Sexual Activity    Alcohol use: No     Alcohol/week: 0.0 standard drinks    Drug use: No    Sexual activity: Not on file   Other Topics Concern    Not on file   Social History Narrative    , Lives With: Alone, son lives down the street, dtr is in the area    Type of Home: [address]: One level    Home Access: Stairs to enter with rails- Number of Steps: 2- Rails: Both    Bathroom Shower/Tub: Tub/Shower unit, Bathroom Equipment: Grab bars in shower, Shower chair    Home Equipment: Rolling walker, Cane(Pt infrequemtly uses DME for ambulation and prefers to furniture walk in home)    ADL Assistance: Independent, [address]: Independent    Homemaking Responsibilities: Yes    Ambulation Assistance: Independent, Transfer Assistance: Independent    Additional Comments: Son stops over 2 times daily         Social Determinants of Health     Financial Resource Strain: Not on file   Food Insecurity: Not on file   Transportation Needs: Not on file   Physical Activity: Not on file Stress: Not on file   Social Connections: Not on file   Intimate Partner Violence: Not on file   Housing Stability: Not on file     Social supports listed above. Prior Device(s) used:  As above    History of falls:  Rarely falls    In depth analysis of complex functional data; the patient has been:    Current Rehabilitation Assessments:  Physical Therapy:   Bed mobility:  Bed mobility  Bridging: Stand by assistance (08/04/22 1014)  Rolling to Left: Contact guard assistance (08/04/22 1014)  Rolling to Right: Minimal assistance (08/04/22 1014)  Supine to Sit: Minimal assistance (08/04/22 1014)  Sit to Supine: Contact guard assistance (08/04/22 1014)  Bed Mobility Comments: increased time and effort to complete all transitions. Hand over hand assist to complete each activity. (08/04/22 1014)  Supine to Sit  Assistance Level: Stand by assist (08/04/22 1312)  Scooting  Assistance Level: Stand by assist (08/04/22 1312)  Transfers:  Transfers  Sit to Stand: Contact guard assistance (08/04/22 1017)  Stand to sit: Contact guard assistance (08/04/22 1017)  Bed to Chair: Contact guard assistance (08/04/22 1017)  Comment: Pt reaches out for HHA for each transition. Directed in correct hand placement however minimal follow through. Improves with [employer] (08/04/22 1017)  Sit to Stand  Assistance Level: Supervision (08/04/22 1312)  Stand to Sit  Assistance Level: Supervision (08/04/22 1312)  Stand Pivot  Assistance Level: Contact guard assist (08/04/22 1312)  Gait:   Ambulation  WB Status: wbat (08/04/22 1018)  Ambulation  Surface: level tile (08/04/22 1018)  Device: Hand-Held Assist;Single point cane;Rolling Walker (08/04/22 1018)  Other Apparatus: O2 (08/04/22 1018)  Assistance: Moderate assistance;Contact guard assistance (08/04/22 1018)  Quality of Gait: ModA to ambulate with SPC and HHA. Steppage pattern B LEs d/t foot drop. Pt with significant ant/post instability noted.  Reaches out at times for furniture and therapist support. Cues for redirection with minimal follow through. Improved balance and gait pattern with [employer]. Verbal cues for management of obstacles with WW. (08/04/22 1018)  Distance: 15ft X 2 (08/04/22 1018)  Ambulation  Surface: Carpet (08/04/22 1525)  Device: Rolling walker (08/04/22 1525)  Distance: 15' x 2, 25' with a turn each gait. (08/04/22 1525)  Activity: Within Room (08/04/22 1525)  Additional Factors: Set-up; Verbal cues (08/04/22 1525)  Assistance Level: Contact guard assist (08/04/22 1525)  Gait Deviations: Slow raji;Decreased step length bilateral;Decreased heel strike right;Decreased heel strike left; Wide base of support (08/04/22 1525)  Skilled Clinical Factors: Pt with bilateral foot drop and not wearing AFO's this PM.  Pt goes barefoot at home. Pt without LOB with gait. (08/04/22 1525)  Stairs:  Stairs/Curb  Stairs?: No (environmental limitations) (08/04/22 1018)  Stairs  Stair Height: 6'' (08/04/22 1525)  Device: Bilateral handrails (08/04/22 1525)  Number of Stairs: 4 (08/04/22 1525)  Additional Factors: Set-up; Verbal cues; Non-reciprocal going up;Non-reciprocal going down (08/04/22 1525)  Assistance Level: Contact guard assist;Minimal assistance; Moderate assistance (08/04/22 1525)  Skilled Clinical Factors: Pt reuiring CGA with first two steps, but then Min A with third and Mod with fourth. Pt only CGA with descending forward. (08/04/22 1525)  W/C mobility:       Occupational Therapy:   Hand Dominance: Right  ADL  Feeding: Setup (08/04/22 1114)  Grooming: Setup; Increased time to complete (08/04/22 1114)  UE Bathing: Stand by assistance; Increased time to complete (08/04/22 1114)  LE Bathing: Moderate assistance; Increased time to complete (08/04/22 1114)  UE Dressing: Contact guard assistance; Increased time to complete (08/04/22 1114)  LE Dressing: Maximum assistance; Increased time to complete (08/04/22 1114)  Toileting: Maximum assistance (08/04/22 1114)  Toilet Transfers  Toilet - Technique: Stand Ada Baumann MD        carvedilol (COREG) tablet 6.25 mg  6.25 mg Oral BID  Ada Baumann MD   6.25 mg at 08/04/22 0814    cefTRIAXone (ROCEPHIN) 1,000 mg in dextrose 5 % 50 mL IVPB mini-bag  1,000 mg IntraVENous Q24H Ada Baumann MD   Stopped at 08/03/22 1657    citalopram (CELEXA) tablet 20 mg  20 mg Oral Daily Ada Baumann MD   20 mg at 08/04/22 0814    enoxaparin (LOVENOX) injection 70 mg  1 mg/kg SubCUTAneous BID Ada Baumann MD   70 mg at 08/04/22 0821    [START ON 8/6/2022] ferrous sulfate (IRON 325) tablet 325 mg  325 mg Oral Every Other Day Ada Baumann MD        furosemide (LASIX) tablet 20 mg  20 mg Oral Daily Ada Baumann MD   20 mg at 08/04/22 0814    HYDROcodone-acetaminophen (NORCO) 5-325 MG per tablet 1 tablet  1 tablet Oral Q4H PRN Ada Baumann MD        lactobacillus acidophilus [facility]) 2 tablet  2 tablet Oral TID Ada Baumann MD   2 tablet at 08/04/22 1455    levothyroxine (SYNTHROID) tablet 88 mcg  88 mcg Oral Daily Ada Baumann MD   88 mcg at 08/04/22 0814    loperamide (IMODIUM) capsule 2 mg  2 mg Oral 4x Daily PRN Ada Baumann MD        miconazole (MICOTIN) 2 % powder   Topical BID Ada Baumann MD   Given at 08/04/22 1124    ondansetron (ZOFRAN-ODT) disintegrating tablet 4 mg  4 mg Oral Q8H PRN Ada Baumann MD        Or    ondansetron (ZOFRAN) injection 4 mg  4 mg IntraVENous Q6H PRN Ada Baumann MD        pantoprazole (PROTONIX) tablet 40 mg  40 mg Oral QAM AC Ada Baumann MD   40 mg at 08/04/22 0604    polyethylene glycol (GLYCOLAX) packet 17 g  17 g Oral Daily PRN Ada Baumann MD        sodium chloride flush 0.9 % injection 5-40 mL  5-40 mL IntraVENous 2 times per day Ada Baumann MD   10 mL at 08/04/22 1121    sodium chloride flush 0.9 % injection 5-40 mL  5-40 mL IntraVENous PRN Ada Baumann MD        warfarin placeholder: dosing by pharmacy   Other Karolina Harry MD        Vitamin D (CHOLECALCIFEROL) tablet 2,000 Units  2,000 Units Oral Dinner Laxmi Null, DO   2,000 Units at 08/03/22 1622    cyanocobalamin injection 1,000 mcg  1,000 mcg IntraMUSCular Weekly Deb Scullin, DO   1,000 mcg at 08/04/22 1121    coenzyme Q10 capsule 100 mg  100 mg Oral Daily Deb Scullin, DO   100 mg at 08/04/22 0814    lidocaine 4 % external patch 3 patch  3 patch TransDERmal Daily Deb Scullin, DO        acetaminophen (TYLENOL) tablet 650 mg  650 mg Oral Q4H PRN Deb Scullin, DO        bisacodyl (DULCOLAX) suppository 10 mg  10 mg Rectal Daily PRN Laxmi Null, DO        fleet rectal enema 1 enema  1 enema Rectal Daily PRN Deb Scullin, DO           Allergies   Allergen Reactions    Latex     Tape Talon Boss Tape]                       FAMILY HISTORY:  Does not pertain to chief complaint. Review of Systems   Constitutional:  Positive for fatigue and fever. Gastrointestinal:  Positive for abdominal pain and anorexia. Musculoskeletal:  Positive for arthralgias and myalgias. Neurological:  Positive for weakness and numbness. Objective  /84   Pulse 72   Temp 98.6 °F (37 °C) (Oral)   Resp 16   Ht 5' (1.524 m)   Wt 164 lb (74.4 kg)   SpO2 98%   BMI 32.03 kg/m² *    Physical Exam  Ortho Exam  Neurologic Exam    After extensive review of the records and above physical exam, I have formulated the following diagnoses and plan:      DIAGNOSES:    1. The patient was admitted to the acute rehabilitation unit with the primary rehab diagnosis being severe abnormality of gait and mobility and impaired self care and ADL's due to  Charcot Paradise Brand tooth neuropathy    Compared to Pre-Admission Assessment, patients medical and functional status remain challengingly complex and patient continues to requireintensive therapeutic intervention from multiple therapies, therefore, initiate acute intensive comprehensive inpatient rehabilitation program including PT/OT to improve balance, ambulation, ADLs, and to improve the P/AROM. Functional and medical status reassessed regarding patients ability to participate in therapies and patient found to be able to participate in acute intensive comprehensive inpatient rehabilitation program.  Therapeutic modifications regarding activities in therapies, place, amount of time per day and intensity of therapy made daily. Enroll in acute course of therapy program to include 1 1/2 hours per day of PT 5 to 7days per week and 1 1/2 hours per day of OT 5 to 7 days per week,     and Rec T 1/2 hour per day 3-5 days per week. The patient is stable medically and physically on previous exam.  If deemed necessary therapy will be spread out over a 7-day window. This patient presented with significant new onset decreased mobility and inability to perform activities of daily living skills independently and is at significant risk for prolonged disability  For this reason they have been admitted to [address] [place] [address]. The patient's current functional and medical status are highly complex but the patient is able to participate in intensive rehabilitation. A comprehensive inpatient rehabilitation program is appropriate. The patient will undergo initial evaluation by the rehabilitation team and be discussed at regular treatment team meetings to assess progress, mobility, self care, mood and discharge issues. Physical therapy will be consulted for mobility and endurance issues and should be performed 1 to 2 times per day, 7 days per week for the length of stay. Occupational therapy will be consulted for activities of daily living and should be performed 1 to 2 times per day, 7 days per week for the length of stay. Their capacity to participate at an acute level, decision to be treated in the gym, room or on the unit, their activity goals for the day and the number of minutes of active participation will be reassessed and re-prescribed daily.   Because this Coreg). Monitor heart rate and blood pressure as well as medications effects on vital signs before during and after therapy with especial focus on preventing orthostasis and falls risk. Hyponatremia-recheck BMP avoid excessive water  Anemia-Monitor vital signs monitor for orthostasis and tachycardia, check H&H prn. Vitamin B12 and iron-dose iron with food to prevent GI upset. Monitor for constipation. Monitor stools for blood. Hypothyroid-  Synthroid and titrate dosing. Follow vital signs, recheck TSH and thyroid studies as outpatient. 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 Osteoarthritis of lumbar spine with myelopathy-her extremity strengthening    Depression-emotional support provided daily, vitamin B12, encourage participation in rehabilitation support group and recreational therapy, adjust/add medications (B12, Celexa)  Abdominal mass abscess versus cancer-drain still in place pending cytology-patient is on IV Zosyn pending cultures-Pt has right abd abscess/mass. S/p drainage. 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 GERD-Elevate head of bed after meals, monitor stools for blood, lowest effective dose of PPI, consider Tums. Yeast rash and immunosuppression-Micatin powder and Floranex   AA now at 5.5cm up from 5.3cm but now there is a new large mural thrombus involving the descending portion of the aortic arch and extending how to the lower thoracic aorta along with large cystic mass. The thrombus was not seen on previous study. Patient had been on xarelto and now on Lovenox transitioning to Coumadin. . Given her abdominal pathology and large thrombus however patient due to her advanced age and high risk for any surgical intervention she is electing for conservative care.       I am especially concerned about their recent medical complexities discharged off the unit due to aneurysm with clots which is felt to be nonoperative. The patient's high risk medication use includes opiates for pain. The patient is high risk for urinary tract infection, an admission urinalysis has been ordered. I will have the nurses check post void residual bladder volumes and place acatheter if excessive urine is retained in the bladder after voiding. The patient is risk for deep venous thromosis, complex deep venous thrombosis protocol prophylaxis has been ordered Lovenox positioning to Coumadin. The patient is high risk for orthostasis and a hydration program and orthostatic blood pressure screening have been ordered. I will attempt to get old records from the patient's previous hospital stay. Care everywhere on [facility] was utilized. 3.  Current and previous medications were reviewed and summarized and compared to old medication lists from home and from the acute floor. 4.  Complex labs and x-rays were reviewed. I will review patient's old EKG and labs. 5.  Will provide emotional support for this patient regarding adjustment to their disability. Cognition and mood will be screened daily and addressed by rehabilitation psychology and/or speech therapy as appropriate. I have encouraged the patient to attend the Rehab Adjustment to Disability Support Group and recreational therapy. 6.  Estimated length of stay is   2-3 weeks. Discharge to home with help from family and home health PT, OT, RN, and aide. Patient should be independent at discharge. 7.  The patient's medical and rehab prognosis are good. [address] regarding the patient's back up to general medical needs. A welcome letter was presented with an explanation of my services, my specialty and what to expect during the rehabilitation process.   As well as introducing myself, I also wrote my name on their bedside marker board with their name as well as the names of the other physicians with an explanation of our individual roles in their care, as well as the rehab process.             Tevin Loredo D.O., CELENA&CARINA

## 2022-08-04 NOTE — PROGRESS NOTES
Charcot Arleen Tooth muscular atrophy     CHF (congestive heart failure) (HCC)     Chronic diastolic CHF (congestive heart failure) ([facility] 4/1/2022    Depression     Descending aortic aneurysm ([facility] 6/23/2017    Essential hypertension 2/16/2018    Headache     HTN (hypertension)     Impaired mobility and activities of daily living     Lumbar stenosis with neurogenic claudication     Myelopathy ([facility]     Osteoarthritis      Past Surgical History:   Procedure Laterality Date    JOINT REPLACEMENT Bilateral     knees    OTHER SURGICAL HISTORY Right 07/21/2022    cat scan guided abdominal mass aspiration with drain placement by Dr. Keely Watson Left 02/25/2021    LEFT PLEURAL CATHETER INSERTION performed by Elias Manzano MD at [place] [id number] Left 01/29/2021    total of 755 cc removed per Dr Annabelle Velasco specimen sent to lab       Restrictions:  Restrictions/Precautions  Restrictions/Precautions: Fall Risk  Position Activity Restriction  Other position/activity restrictions: Abdominal drain    Subjective: \"Thank you. \"  Pt seen with daughter present. Daughter interpreted information. General  Chart Reviewed: Yes  Family / Caregiver Present: Yes (Daughter)  Referring Practitioner: Dr. Jose De Jesus  Diagnosis: Impaired mobility and ADL's due to Charcot Leonetta Abide Tooth Neuropathy    Patient's date of birth confirmed: Yes     Pain at start of treatment: No    Pain at end of treatment: No    Location:   Nursing notified: Not Applicable  RN:   Intervention: Repositioned    Social Functional:  Social/Functional History  Lives With: Alone  Type of Home: House  Home Layout: One level  Home Access: Stairs to enter with rails  Entrance Stairs - Number of Steps: 2  Entrance Stairs - Rails: Both  Bathroom Shower/Tub: Tub/Shower unit  Bathroom Equipment: Grab bars in shower; Shower chair  Home Equipment: Cane;Oxygen (B AFOs)  Has the patient had two or more falls in the past year or any fall with injury in the past year?:  (Pt reports one fall in the last year)  Receives Help From: Family  ADL Assistance: Independent  Homemaking Assistance: Independent  Homemaking Responsibilities: Yes  Meal Prep Responsibility: Primary  Laundry Responsibility: Primary  Cleaning Responsibility: Primary  Shopping Responsibility: No (Son performs)  Ambulation Assistance: Independent (furniture walking and cane prn)  Transfer Assistance: Independent  Active : No  Additional Comments: Pt's daughter interpreted information per patient request.    Objective:    Self Care Status:  ADL  Feeding: Setup  Grooming: Setup; Increased time to complete  UE Bathing: Stand by assistance; Increased time to complete  LE Bathing: Moderate assistance; Increased time to complete  UE Dressing: Contact guard assistance; Increased time to complete  LE Dressing: Maximum assistance; Increased time to complete  Toileting: Maximum assistance  Toilet Transfers  Toilet - Technique: Stand step  Equipment Used: Grab bars  Toilet Transfer: Minimal assistance  Tub Transfers  Tub Transfers: Not tested  Shower Transfers  Shower - Transfer From: Wheelchair  Shower - Transfer Type: To and From  Shower - Transfer To: Shower seat with back  Shower - Technique: Stand pivot  Shower Transfers: Minimal assistance      Functional Mobility:  Balance  Sitting Balance: Supervision  Standing Balance: Minimal assistance    Bed mobility and transfers:  Transfers  Sit to stand: Contact guard assistance  Stand to sit: Contact guard assistance      Observation:  Observation/Palpation  Posture: Fair  Observation: Mild flexion, abdominal drain in place, O2 in place    Orientation and Cognition:  Orientation  Overall Orientation Status:  (Pt mainly speaks [place])  Orientation Level: Oriented to place;Oriented to time;Oriented to person (verbal prompts for time required)  Cognition  Overall Cognitive Status: Exceptions  Arousal/Alertness: Appropriate responses to stimuli  Following Commands:  Follows Independent  Grooming: Independent  Bathing: Mod I  UE Dressing: Independent  LE Dressing: Mod I  Toileting: Independent  Toilet Transfer: Independent  Shower/Tub Transfer: Mod I  - Patient will sequence self-care routine with out and   verbal/tactile cues  - Patient will improve static and dynamic standing balance to complete pants management at standing level  - Patient will improve functional endurance to tolerate/complete 60 minutes of ADLs. - Patient will improve bilateral UE strength and endurance to Good- in order to participate in self-care activities as projected. - Patient will perform kitchen mobility at device level without episodes of LOB and good safety awareness   - Patient will perform basic room mobility at least restrictive device level. - Patient and/or caregiver will demonstrate understanding of energy conservation techniques with out verbal/tactile cues. - Patient and/or caregiver will demonstrate understanding of recommended HEP for bilateral UE strength.       Therapy Time:   Individual   Time In 0825   Time Out 0930   Minutes 65            Eval: 65 minutes     Electronically signed by:    SERAFIN Sesay,   7/7/8244, 11:30 AM Electronically signed by SERAFIN Sesay on 8/8/57 at 1:07 PM EDT

## 2022-08-04 NOTE — PROGRESS NOTES
Pt called for assistance to bathroom, CGA to transfer from bed to w/c and w/c to toilet. Pt had a BM, francisco care done independently by pt. Pt back to bed, bed alarm activated. Per therapy pt's drain cap came unscrewed and leaked on the floor during last session, estimated 25 cc of drainage. Drain intact and accordion compressed at this time with no leakage noted.

## 2022-08-04 NOTE — PLAN OF CARE
Problem: Discharge Planning  Goal: Discharge to home or other facility with appropriate resources  8/4/2022 0125 by Nancy Reyes RN  Outcome: Progressing  8/3/2022 1528 by Nayana Anne RN  Outcome: Progressing     Problem: ABCDS Injury Assessment  Goal: Absence of physical injury  8/4/2022 0125 by Nancy Reyes RN  Outcome: Progressing  8/3/2022 1528 by Nayana Anne RN  Outcome: Progressing     Problem: Skin/Tissue Integrity  Goal: Absence of new skin breakdown  Description: 1. Monitor for areas of redness and/or skin breakdown  2. Assess vascular access sites hourly  3. Every 4-6 hours minimum:  Change oxygen saturation probe site  4. Every 4-6 hours:  If on nasal continuous positive airway pressure, respiratory therapy assess nares and determine need for appliance change or resting period. 8/4/2022 0125 by Jaqui Dos Santos RN  Outcome: Progressing  8/3/2022 1528 by Nayana Anne RN  Outcome: Progressing     Problem: Chronic Conditions and Co-morbidities  Goal: Patient's chronic conditions and co-morbidity symptoms are monitored and maintained or improved  Outcome: Progressing

## 2022-08-04 NOTE — PROGRESS NOTES
Pt in bed sleeping tonight. Awakens easily. Denied any pain. Drain to right upper quad intact and draining small amount of serosanguinous drainage. Dressing to RUQ intact with small amount of tan drainage. Last BM was 8/2/22. Call light in reach and bed alarm on Electronically signed by Luis Jolly.  Arsalan Santillan on 8/4/2022 at 1:41 AM

## 2022-08-04 NOTE — PROGRESS NOTES
Nutrition Assessment     Type and Reason for Visit: Initial, Consult    Nutrition Recommendations/Plan:   Modify Current Diet     Malnutrition Assessment:  Malnutrition Status:  No Malnutrition    Nutrition Assessment:  Pt appears stable from a nutritional standpoint, with verbalized good appetite, noted good intake at meals, with no nutritional complaints. To modify to KRISHNA diet. Nutrition Related Findings:   PMH-htn, CHF, CHARCOT JENA TOOTH NEUROPATHY. R shayy abdominal abscess. Trace Gen & +1 BLE edema noted. Wound Type: None    Current Nutrition Therapies:    ADULT DIET;  Regular    Anthropometric Measures:  Height: 5' (152.4 cm)  Current Body Wt:  164lb  BMI:  32.03    Nutrition Diagnosis:   No nutrition diagnosis at this time     Nutrition Interventions:   Food and/or Nutrient Delivery: Modify Current Diet  Nutrition Education/Counseling: No recommendation at this time  Coordination of Nutrition Care: Continue to monitor while inpatient       Goals:     Goals: PO intake 75% or greater       Nutrition Monitoring and Evaluation:      Food/Nutrient Intake Outcomes: Food and Nutrient Intake  Physical Signs/Symptoms Outcomes: Weight, Biochemical Data, Fluid Status or Edema    Gabby Almendarez RD, LD

## 2022-08-04 NOTE — CONSULTS
where I was focused exclusively on this patient: 55 minutes. This time is excluding time spent performing procedures or significant events occurring earlier or later in the day requiring my attention and focus. Subjective:   Admit Date: 8/3/2022  PCP: Aleksey Lambert MD    Patient is a 80-year-old female who presented to the emergency department due to abdominal pain, headache, dizziness. Imaging revealed right sided abdominal mass/abscess. She underwent guided drainage per interventional radiology. CT scan also revealed enlarging ascending aortic aneurysm and new mural thrombus. She was initiated on heparin drip and is transitioned to Lovenox with bridge to Coumadin. She was initially going to transfer to [facility] for further management. Patient/family elected for conservative management and continuation of current care regimen. Upon medical clearance, the patient was transferred to the acute inpatient rehab unit. Patient was seen and examined while in therapy. Daughter was present. She is hard of hearing. Denied fever or chills. No headache or dizziness. No chest pain or shortness of breath. No abdominal pain or nausea. Right-sided drain with serosanguineous drainage noted. No calf tenderness. Objective:     Vitals:    08/03/22 1530 08/03/22 1622 08/03/22 2009 08/04/22 0632   BP:  (!) 137/92 126/74 (!) 141/74   Pulse:  78 60 62   Resp:   17 16   Temp:   97.3 °F (36.3 °C) 98.6 °F (37 °C)   TempSrc:    Oral   SpO2:   96% 98%   Weight: 164 lb (74.4 kg)      Height: 5' (1.524 m)        General appearance: Alert, oriented x4, no acute distress  Neurological: Alert, awake, and orientated x 4. Motor and sensory grossly intact. No focal deficits. GCS of 15. Lungs: CTAB, no rhonchi, rales, wheezes or use of accessory muscles  Heart:  S1, S2 normal, RRR, murmur present  Abdomen: (+) BS, soft, non-tender; non distended no guarding or rigidity.    Extremities: Trace BLE edema, no calf

## 2022-08-04 NOTE — PROGRESS NOTES
Patient was readmitted to [address] [place] [address].  spoke with patient and daughter, who reported no new cognitive and/or swallow deficits since discharge and readmit to [address] [place] [address]. Patient's daughter does not feel patient has needs for ST at this time.  spoke with Dr. Lani Severe, who agreed ST is not needed at this time. Dr. Lani Severe to cancel SLP eval order.      Electronically signed by SOBIA Morales on 8/4/2022 at 1:00 PM

## 2022-08-04 NOTE — PROGRESS NOTES
Physical Therapy Rehab Treatment Note  Facility/Department: [facility]  [facility]       NAME: Artis Matos  : 1932 (80 y.o.)  MRN: [id number]  CODE STATUS: DNR-CC    Date of Service: 2022       Restrictions:  Restrictions/Precautions: Fall Risk  Position Activity Restriction  Other position/activity restrictions: Abdominal drain       SUBJECTIVE:   Subjective: i'm ready to go. Pain  Pain: Denies pre and post session pain. OBJECTIVE:             Supine to Sit  Assistance Level: Stand by assist  Scooting  Assistance Level: Stand by assist    Sit to Stand  Assistance Level: Supervision  Stand to Sit  Assistance Level: Supervision  Stand Pivot  Assistance Level: Contact guard assist    Ambulation  Surface: Carpet  Device: Rolling walker  Distance: 15' x 2, 25' with a turn each gait. Activity: Within Room  Additional Factors: Set-up; Verbal cues  Assistance Level: Contact guard assist  Gait Deviations: Slow raji;Decreased step length bilateral;Decreased heel strike right;Decreased heel strike left; Wide base of support  Skilled Clinical Factors: Pt with bilateral foot drop and not wearing AFO's this PM.  Pt goes barefoot at home. Pt without LOB with gait. Stairs  Stair Height: 6''  Device: Bilateral handrails  Number of Stairs: 4  Additional Factors: Set-up; Verbal cues; Non-reciprocal going up;Non-reciprocal going down  Assistance Level: Contact guard assist;Minimal assistance; Moderate assistance  Skilled Clinical Factors: Pt reuiring CGA with first two steps, but then Min A with third and Mod with fourth. Pt only CGA with descending forward. Neuromuscular Education  NDT Treatment: Gait  (TUG)  Neuromuscular Comments: TUG = 90 seconds without AFO's and with walker with CGA/SBA.     PT Exercises  A/AROM Exercises: Marching, LAQ, Side kicks x 20  Resistive Exercises: MRE'S: ABD/ADD/EXT x 20          Activity Tolerance  Activity Tolerance: Patient tolerated treatment well    Education Provided: Fall Prevention Strategies;Precautions; Equipment  Education  Education Given To: Patient; Family  Education Provided: Fall Prevention Strategies;Precautions; Equipment  Education Provided Comments: Pt educated on needing walker at home ALL the time. Pt agreed. Family agreed. Family educated that therapy will reinforce this daily. Education Method: Verbal  Barriers to Learning: Other (Comment); Hearing  Education Outcome: Verbalized understanding;Continued education needed        ASSESSMENT/PROGRESS TOWARDS GOALS:   Assessment  Assessment: Pt following English very well without need for . Family present but does not interpret medical terminology or what is said exactly and may be saying own opinions, etc.  Pt declines use of medical professional . Family used very minimally for interpreting and instructed to say exactly what this therapist says. Pt returning to baseline before leaving rehab a week ago. Pt needs to practice gait with and without AFO's as pt does not wear them at home. Able to complete TUG but goal not met and pt is a high risk of falls per test results. Activity Tolerance: Patient tolerated treatment well  Discharge Recommendations: Home with assist PRN;Home with Home health PT    Goals:  Long Term Goals  Long term goal 1: Pt to complete bed mobility with indep  Long term goal 2: Pt to complete transfers with indep  Long term goal 3: Pt to ambulate 50ft with LRD and indep  Long term goal 4: Pt to manage 2 steps with B HR and Supervision  Long term goal 5: Pt to complete TUG within 20sec to demonstrate improved functional mobility/balance    PLAN OF CARE/Safety:   Safety Devices  Type of Devices: All fall risk precautions in place; Left in chair      Therapy Time:   Individual   Time In 1300   Time Out 1400   Minutes 60     Minutes:60  Transfer/Bed mobility training: 10  Gait trainin  Neuro re education:15  Therapeutic ex: Jessi Díaz JERRICA Sales, 08/04/22 at 3:32 PM

## 2022-08-04 NOTE — CARE COORDINATION
hearing it. The patient's daughter is at bedside and the patient has agreed to allow her to interpret for her. The Patient is in a one-level home with 2 steps in. She has grab bars in the shower and a cane. (She has a walker in her closet and will never use it). The patient was independent with he adls and her son was doing her Iadls prior to admission. She has and is current with he pCP Dr Phillip Gottlieb. Her daughter is in town and will return to [place] soon. Her son Yoselyn will be supportive and the one to continue to help her at [place] as well as the [facility]. She wears 02 at # liters at all times at home. The patient was doing the cooking and cleaning at home. She would like [facility] as she has had them before. The patient uses [facility] in [place] and has no problems affording her medications.   Electronically signed by Edward Lux RN on 8/4/22 at 11:31 AM EDT

## 2022-08-04 NOTE — PROGRESS NOTES
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  [facility] rehab admit 8/3/2022  Patient Active Problem List    Diagnosis Date Noted    A-fib 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     Aortic thrombus ([address] 2022    Abscess 2022    Hx of drainage of abscess 2022    Generalized abdominal pain 2022    Streptococcus viridans infection 2022    Abdominal mass 2022    Right lower quadrant abdominal pain 2022    Hyponatremia 2022    Hypokalemia 2022    Anemia 2022    CHF (congestive heart failure) ([address] 2022    Hypothyroid 2022    Central retinal vein occlusion, left eye, stable 2021    Lumbar stenosis with neurogenic claudication 2016    Chronic diastolic CHF (congestive heart failure) ([address] 2022    Acute cystitis with hematuria 2021    Acute on chronic combined systolic (congestive) and diastolic (congestive) heart failure ([address] 2021    Pleural effusion 2021    Hypoxia     Acute pulmonary edema (HCC)     Gait abnormality 2021    Acute on chronic combined systolic and diastolic CHF (congestive heart failure) ([address] 2021    Essential hypertension 2018    Shortness of breath     Dizziness     Thoracic aortic aneurysm without rupture ([address] 2017    Descending aortic aneurysm ([address] 2017    Osteoarthritis     Myelopathy ([address]     Headache     Depression     Acquired scoliosis 2016    Osteoporosis 2016    Charcot Arleen Tooth muscular atrophy 2016    Excess weight 2016    Osteoarthritis of lumbar spine with myelopathy 2016       Past Medical History:   Diagnosis Date    Ascending aortic QC. Inside she is always barefoot)  Transfer Assistance: Independent  Patient's  Info: family - son lives close by; dtr lives in [place]  Additional Comments: Social-functional information collected through chart review and dtr able to confirm. Pt declines teleinterpreter. Per pt's dtr, pt cannot hear the teleinterpreter and from previous experiences, the [facility] interpreters are unable to speak her particular dialect. Dtr reports that she would rather have her family present to clarify instructions. This writer discussed translation concerns with nursing manager who will investigate options other than family. OBJECTIVE:   Vision/Hearing:  Vision  Vision Exceptions:  (Per chart review, has glasses but refuses to wear them)  Hearing: Exceptions to [facility]  Hearing Exceptions: Hard of hearing/hearing concerns; No hearing aid (significantly [place])    Cognition/Observation:  Orientation Level: Oriented to time, Oriented to situation, Oriented to person  Follows Commands: Impaired (requires redirection and repetition d/t [place] concerns)    Observation/Palpation  Observation: No acute distress noted. Pt expresses excitement to be in [place] rehab. Drain present R abdomen. O2 via NC.    ROM:  RLE PROM: WFL  RLE General PROM: Ankle DF to netural; limited hip rotation IR/ER  LLE PROM: WFL  LLE General PROM: Ankle DF to netural; limited hip rotation IR/ER    Strength:  Strength RLE  Strength RLE: WFL  Comment: Hip strength grossly 2/5; 0/5 ankle DF  Strength LLE  Strength LLE: WFL  Comment: Hip strength grossly 2/5; 0/5 ankle DF  Strength Other  Other: Trunk strength grossly 2/5. Noted proximal weakness throughout. Neuro:  Balance  Sitting - Static: Good  Sitting - Dynamic: Good;Fair  Standing - Static: Fair  Standing - Dynamic: Poor  Comments: Delayed righting responses. Absent ankle strategies d/t foot drop.                          Bed mobility  Bridging: Stand by assistance  Rolling to Left: Contact guard assistance  Rolling to Right: Minimal assistance  Supine to Sit: Minimal assistance  Sit to Supine: Contact guard assistance  Bed Mobility Comments: increased time and effort to complete all transitions. Hand over hand assist to complete each activity. Transfers  Sit to Stand: Contact guard assistance  Stand to sit: Contact guard assistance  Bed to Chair: Contact guard assistance  Comment: Pt reaches out for HHA for each transition. Directed in correct hand placement however minimal follow through. Improves with [employer]    Ambulation  WB Status: wbat  Ambulation  Surface: level tile  Device: Hand-Held Assist;Single point cane;Rolling Walker  Other Apparatus: O2  Assistance: Moderate assistance;Contact guard assistance  Quality of Gait: ModA to ambulate with SPC and HHA. Steppage pattern B LEs d/t foot drop. Pt with significant ant/post instability noted. Reaches out at times for furniture and therapist support. Cues for redirection with minimal follow through. Improved balance and gait pattern with [employer]. Verbal cues for management of obstacles with [employer]. Distance: 15ft X 2    Stairs/Curb  Stairs?: No (environmental limitations)              Activity Tolerance  Activity Tolerance: Patient tolerated treatment well    Patient Education  Education Given To: Patient; Family  Education Provided: Plan of Care;Role of Therapy;Precautions  Education Provided Comments: use of AFOs and [employer] in therapy for training purposes  Education Method: Verbal;Demonstration (family providing interpretation PRN)  Barriers to Learning:  (language barrier)  Education Outcome: Continued education needed    Quality Indicators (IRF-CHAI):  Rolling L and R: Partial/Moderate Assistance (helper does <50%) - 3  Sit>Supine: Supervision or Touching Assistance - 4  Supine>Sit: Partial/Moderate Assistance (helper does <50%) - 3  Sit>Stand: Supervision or Touching Assistance - 4  Chair/Bed>Chair Transfer: Supervision or Touching Assistance - 4  Car Transfers: Not attempted due to Environmental Limitations (i.e. weather, equipment unavailable) - 10  Walk 10 ft: Partial/Moderate Assistance (helper does <50%) - 3  Walk 50 ft with two 90 degree turns: Not attempted due to Medical Condition or Safety Concerns (I.e. unsafe or physician orders) - 80  Walk 150 ft in [address]: Not attempted due to Medical Condition or Safety Concerns (I.e. unsafe or physician orders) - 80  Walking 10 ft on Unlevel Surface: Not attempted due to Medical Condition or Safety Concerns (I.e. unsafe or physician orders) - 80  Picking up Objects from Standing Position: Not attempted due to Medical Condition or Safety Concerns (I.e. unsafe or physician orders) - 80  Stairs: No Not attempted due to medical condition or safety concerns (i.e. unsafe or physician order) - 88  WC Mobility: No Not Applicable (pt did not complete item prior to admission) - 9    ASSESSMENT:  Body Structures, Functions, Activity Limitations Requiring Skilled Therapeutic Intervention: Decreased functional mobility ; Decreased strength;Decreased posture;Decreased balance;Decreased endurance;Decreased safe awareness;Decreased ADL status; Decreased ROM; Decreased coordination  Decision Making: Medium Complexity  History: High  Exam: Med  Clinical Presentation: High    Therapy Prognosis: Good  Barriers to Learning: language barrier, heard of hearing           CLINICAL IMPRESSION: Pt presents with weakness and impaired balance which has negatively impacted her functional mobility and increased her risk of falls. Continued PT indicated to progress mobility and facilitate DC at highest level of indep and safety. Concern for pt's compiance with therapy recommendations as she prefers to use cane over walker and does not normally utilize AFOs for walking. Will follow.     PLAN OF CARE:  Frequency: 1-2 treatment sessions per day, 5-7 days per week     Current Treatment Recommendations: Strengthening, ROM, Balance training, Functional mobility training, Transfer training, Endurance training, Gait training, Stair training, Cognitive reorientation, Patient/Caregiver education & training, Safety education & training, Equipment evaluation, education, & procurement, Therapeutic activities, Home exercise program, Neuromuscular re-education, ADL/Self-care training, Positioning, Modalities    Patient's Goal:  does not state, family wishes pt to regain her indep    GOALS:  Patient goals : does not state, family wishes pt to regain her indep  Long Term Goals  Long term goal 1: Pt to complete bed mobility with indep  Long term goal 2: Pt to complete transfers with indep  Long term goal 3: Pt to ambulate 50ft with LRD and indep  Long term goal 4: Pt to manage 2 steps with B HR and Supervision  Long term goal 5: Pt to complete TUG within 20sec to demonstrate improved functional mobility/balance    ELOS:   Plan weeks: 2-3 weeks    Therapy Time:    Individual   Time In 0930   Time Out 1000   Minutes 30     8 Minutes (transfers 6min; 2min gait)       Mary Durbin PT, 08/04/22 at 12:39 PM

## 2022-08-04 NOTE — PROGRESS NOTES
OCCUPATIONAL THERAPY  INPATIENT REHAB TREATMENT NOTE  [facility]      NAME: Roxana Anthony  : 1932 (80 y.o.)  MRN: [id number]  CODE STATUS: DNR-CC  Room: R250/R250-01    Date of Service: 2022    Referring Physician: Dr. Piotr Escudero Diagnosis: Impaired mobility and ADL's due to Charcot [place] Shown Tooth Neuropathy    Restrictions  Restrictions/Precautions  Restrictions/Precautions: Fall Risk  Required Braces or Orthoses?: No     Position Activity Restriction  Other position/activity restrictions: Abdominal drain    Patient's date of birth confirmed: Yes    SAFETY:  Safety Devices  Safety Devices in place: Yes  Type of devices: All fall risk precautions in place; Chair alarm in place; Left in chair    SUBJECTIVE:  Subjective: \" More? .\"     Pain at start of treatment:  No 0/10    Pain at end of treatment:   No 0/10    COGNITION:  Orientation  Orientation Level: Oriented to place;Oriented to time;Oriented to person  Cognition  Overall Cognitive Status: Exceptions  Cognition Comment: Comp Min A, expression Min A, social S, problem min A, Memory I    OBJECTIVE:     Activity:   Patient engaged in B UE ROM/strengthening, B FM coordination and cognition to increase I with ADL's, IADL's and transfers. Patient donned B 1 # wrist weights. Patient able to reach in various vertical planes with MIN difficulty. Patient placed 54 various sized rubber washers on matching vertical dowel rods of varying heights. Patient with MIN difficulty picking up rubber washers. Patient with MOD difficulty matching rubber washers to correct dowel rods. Patient able to remove washers from dowel rods 1 at a time with MAX difficulty. Rest breaks as needed. Transfer:  Patient completed w/c -> EOB at CGA. Patient completed sit -> supine at SBA. Patient completed bed mobility at SBA. Patient left supine in bed with call light in reach, bed alarm on and all needs met.     ASSESSMENT: Patient worked at a slow and steady pace. Activity Tolerance: Patient tolerated treatment well      PLAN OF CARE:  Strengthening, Balance training, Functional mobility training, Neuromuscular re-education, Endurance training, Self-Care / ADL, Cognitive/Perceptual training, Safety education & training, Home management training, Patient/Caregiver education & training     Continue per OT POC    Patient goals : \"to be able to wash my back and my feet. \"  Time Frame for Long term goals : 2 weeks  Long Term Goal 1: Pt within two weeks of evaluation will demonstrate progress to goals to address areas as stated below to increase functional independence for return to PLOF  Long Term Goal 2: Pt will increase independence with ADL Pt will increase independence with ADL transfers  Long Term Goal 3: Pt will increase bilateral UE strength to increase ease of ADL transfers        Therapy Time:   Individual Group Co-Treat   Time In 1400       Time Out 1430         Minutes 30                   ADL/IADL trainin minutes  Therapeutic activities: 25 minutes     Electronically signed by:    PRUDENCE Tom,   2022, 3:13 PM

## 2022-08-05 LAB
BACTERIA: NEGATIVE /HPF
BILIRUBIN URINE: NEGATIVE
BLOOD, URINE: ABNORMAL
CLARITY: CLEAR
COLOR: YELLOW
EPITHELIAL CELLS, UA: ABNORMAL /HPF (ref 0–5)
GLUCOSE URINE: NEGATIVE MG/DL
HYALINE CASTS: ABNORMAL /HPF (ref 0–5)
INR BLD: 1.1
KETONES, URINE: NEGATIVE MG/DL
LEUKOCYTE ESTERASE, URINE: ABNORMAL
NITRITE, URINE: NEGATIVE
PH UA: 7 (ref 5–9)
PROTEIN UA: 30 MG/DL
PROTHROMBIN TIME: 14.6 SEC (ref 12.3–14.9)
RBC UA: ABNORMAL /HPF (ref 0–5)
SPECIFIC GRAVITY UA: 1.01 (ref 1–1.03)
URINE REFLEX TO CULTURE: ABNORMAL
UROBILINOGEN, URINE: 0.2 E.U./DL
WBC UA: ABNORMAL /HPF (ref 0–5)

## 2022-08-05 PROCEDURE — 85610 PROTHROMBIN TIME: CPT

## 2022-08-05 PROCEDURE — 1180000000 HC REHAB R&B

## 2022-08-05 PROCEDURE — 97116 GAIT TRAINING THERAPY: CPT

## 2022-08-05 PROCEDURE — 6370000000 HC RX 637 (ALT 250 FOR IP): Performed by: INTERNAL MEDICINE

## 2022-08-05 PROCEDURE — 97530 THERAPEUTIC ACTIVITIES: CPT

## 2022-08-05 PROCEDURE — 81001 URINALYSIS AUTO W/SCOPE: CPT

## 2022-08-05 PROCEDURE — 6370000000 HC RX 637 (ALT 250 FOR IP): Performed by: PHYSICAL MEDICINE & REHABILITATION

## 2022-08-05 PROCEDURE — 6360000002 HC RX W HCPCS: Performed by: INTERNAL MEDICINE

## 2022-08-05 PROCEDURE — 97535 SELF CARE MNGMENT TRAINING: CPT

## 2022-08-05 PROCEDURE — 36415 COLL VENOUS BLD VENIPUNCTURE: CPT

## 2022-08-05 PROCEDURE — 99233 SBSQ HOSP IP/OBS HIGH 50: CPT | Performed by: PHYSICAL MEDICINE & REHABILITATION

## 2022-08-05 PROCEDURE — 2700000000 HC OXYGEN THERAPY PER DAY

## 2022-08-05 PROCEDURE — 2580000003 HC RX 258: Performed by: INTERNAL MEDICINE

## 2022-08-05 PROCEDURE — 97110 THERAPEUTIC EXERCISES: CPT

## 2022-08-05 RX ORDER — CLOTRIMAZOLE 1 %
CREAM (GRAM) TOPICAL 2 TIMES DAILY
Status: DISCONTINUED | OUTPATIENT
Start: 2022-08-05 | End: 2022-08-18 | Stop reason: HOSPADM

## 2022-08-05 RX ORDER — HYDROCORTISONE 25 MG/G
CREAM TOPICAL 2 TIMES DAILY
Status: DISCONTINUED | OUTPATIENT
Start: 2022-08-05 | End: 2022-08-12

## 2022-08-05 RX ORDER — WARFARIN SODIUM 5 MG/1
7.5 TABLET ORAL
Status: COMPLETED | OUTPATIENT
Start: 2022-08-05 | End: 2022-08-05

## 2022-08-05 RX ADMIN — CLOTRIMAZOLE: 1 CREAM TOPICAL at 20:55

## 2022-08-05 RX ADMIN — LEVOTHYROXINE SODIUM 88 MCG: 88 TABLET ORAL at 08:26

## 2022-08-05 RX ADMIN — LACTOBACILLUS TAB 2 TABLET: TAB at 16:34

## 2022-08-05 RX ADMIN — Medication 100 MG: at 08:26

## 2022-08-05 RX ADMIN — ENOXAPARIN SODIUM 70 MG: 100 INJECTION SUBCUTANEOUS at 20:57

## 2022-08-05 RX ADMIN — FUROSEMIDE 20 MG: 20 TABLET ORAL at 08:26

## 2022-08-05 RX ADMIN — ENOXAPARIN SODIUM 70 MG: 100 INJECTION SUBCUTANEOUS at 08:26

## 2022-08-05 RX ADMIN — LACTOBACILLUS TAB 2 TABLET: TAB at 20:59

## 2022-08-05 RX ADMIN — PANTOPRAZOLE SODIUM 40 MG: 40 TABLET, DELAYED RELEASE ORAL at 05:41

## 2022-08-05 RX ADMIN — WARFARIN SODIUM 7.5 MG: 5 TABLET ORAL at 16:27

## 2022-08-05 RX ADMIN — MICONAZOLE NITRATE: 2 POWDER TOPICAL at 20:59

## 2022-08-05 RX ADMIN — CITALOPRAM HYDROBROMIDE 20 MG: 10 TABLET ORAL at 08:26

## 2022-08-05 RX ADMIN — HYDROCORTISONE: 25 CREAM TOPICAL at 20:59

## 2022-08-05 RX ADMIN — CARVEDILOL 6.25 MG: 6.25 TABLET, FILM COATED ORAL at 16:27

## 2022-08-05 RX ADMIN — CEFTRIAXONE SODIUM 1000 MG: 1 INJECTION, POWDER, FOR SOLUTION INTRAMUSCULAR; INTRAVENOUS at 16:32

## 2022-08-05 RX ADMIN — Medication 2000 UNITS: at 16:27

## 2022-08-05 RX ADMIN — HYDROCORTISONE: 25 CREAM TOPICAL at 13:10

## 2022-08-05 RX ADMIN — Medication 10 ML: at 21:00

## 2022-08-05 RX ADMIN — LACTOBACILLUS TAB 2 TABLET: TAB at 08:26

## 2022-08-05 RX ADMIN — MECLIZINE 12.5 MG: 12.5 TABLET ORAL at 09:57

## 2022-08-05 RX ADMIN — CLOTRIMAZOLE: 1 CREAM TOPICAL at 13:10

## 2022-08-05 RX ADMIN — CARVEDILOL 6.25 MG: 6.25 TABLET, FILM COATED ORAL at 08:25

## 2022-08-05 RX ADMIN — MICONAZOLE NITRATE: 2 POWDER TOPICAL at 08:27

## 2022-08-05 RX ADMIN — Medication 10 ML: at 08:26

## 2022-08-05 ASSESSMENT — ENCOUNTER SYMPTOMS
NAUSEA: 0
BLOOD IN STOOL: 0
SHORTNESS OF BREATH: 0
WHEEZING: 0
COUGH: 0
CHEST TIGHTNESS: 0
GASTROINTESTINAL NEGATIVE: 1
BACK PAIN: 1
EYES NEGATIVE: 1
RESPIRATORY NEGATIVE: 1
STRIDOR: 0

## 2022-08-05 NOTE — PROGRESS NOTES
OCCUPATIONAL THERAPY  INPATIENT REHAB TREATMENT NOTE  [facility]      NAME: Mat Bowman  : 1932 (80 y.o.)  MRN: [id number]  CODE STATUS: DNR-CC  Room: R250/R250-01    Date of Service: 2022    Referring Physician: Dr. Alexi Blanca Diagnosis: Impaired mobility and ADL's due to Charcot Driss Adame Tooth Neuropathy    Restrictions  Restrictions/Precautions  Restrictions/Precautions: Fall Risk  Required Braces or Orthoses?: No     Patient's date of birth confirmed: Yes    SAFETY:  Safety Devices  Safety Devices in place: Yes  Type of devices: All fall risk precautions in place    SUBJECTIVE:  Subjective: \" Thank you girl. You a good girl. \"     Pain at start of treatment:  No 0/10    Pain at end of treatment:   No 0/10    COGNITION:  Orientation  Orientation Level: Oriented to place;Oriented to time;Oriented to person  Cognition  Overall Cognitive Status: Exceptions  Cognition Comment: Comp Sup, expression Mod I, social Ind, problem min A, Memory Sup    OBJECTIVE:    Peg Activity:  Patient engaged in B UE ROM/strengthening, B FM coordination and cognition to increase I with ADL's, IADL's and transfers. Patient donned B 1/2 # wrist weights. Patient able to reach in lateral planes with 0 difficulty. Patient able to reach in forward planes with MIN difficulty. Patient able to  100 large mushroom pegs with MIN difficulty 1 at a time. Patient able to place large pegs 1 at a time in pegboard with MIN difficulty. Patient alternated UE's after every 10th peg. Patient with rest breaks as needed. ASSESSMENT: Patient worked at a slow and steady pace.     Activity Tolerance: Patient tolerated treatment well      PLAN OF CARE:  Strengthening, Balance training, Functional mobility training, Neuromuscular re-education, Endurance training, Self-Care / ADL, Cognitive/Perceptual training, Safety education & training, Home management training, Patient/Caregiver education & training    Continue per OT POC for planned d/c on 8-17-22    Patient goals : \"to be able to wash my back and my feet. \"  Time Frame for Long term goals : 2 weeks  Long Term Goal 1: Pt within two weeks of evaluation will demonstrate progress to goals to address areas as stated below to increase functional independence for return to PLOF  Long Term Goal 2: Pt will increase independence with ADL Pt will increase independence with ADL transfers  Long Term Goal 3: Pt will increase bilateral UE strength to increase ease of ADL transfers        Therapy Time:   Individual Group Co-Treat   Time In 1400       Time Out 1430         Minutes 30                   Therapeutic activities: 30 minutes     Electronically signed by:    PRUDENCE Figueroa,   8/5/2022, 2:52 PM

## 2022-08-05 NOTE — PROGRESS NOTES
Post void residual 406 ml. Straight cath done with sterile technique, 20 cc clear yellow urine into drainage bag, no additional urine output with palpation of lower abdomen, abdomen is firm. Pt navi fairly well. Informed NP of PVR and straight cath results. No new orders. Pt OOB to w/c in room, denies pain, no needs.

## 2022-08-05 NOTE — PROGRESS NOTES
Warfarin Dosing - Pharmacy Consult Note  Consulting Provider: Dr Rufino Gonzales    Indication:   thrombus  Warfarin Dose prior to admission: n/a   Concurrent anticoagulants/antiplatelets: n/a  Significant Drug Interactions: Celexa, Lovenox bridging  Recent Labs     08/03/22  0518 08/04/22  0328 08/04/22  1645 08/05/22  0543   INR 1.1  --  1.1 1.1   HGB 10.4* 10.1*  --   --     241  --   --    LABALBU 3.0* 3.0*  --   --      Recent warfarin administrations                     warfarin (COUMADIN) tablet 7.5 mg (mg) 7.5 mg Given 08/04/22 1834    warfarin (COUMADIN) tablet 5 mg (mg) 5 mg Given 08/03/22 1622    warfarin (COUMADIN) tablet 5 mg (mg) 5 mg Given 08/02/22 1224                Date      INR       Dose  08/02/22   1.2       5 mg  08/03/22   1.1       5 mg  08/04/22   1.1    7.5 mg  08/05/22   1.1    7.5 mg     Assessment/Plan  (Goal INR: 2 - 3)  INR remains below goal, at 1.1 again today (goal range 2-3). Will repeat dose of warfarin 7.5 mg today only. Continue Lovenox bridging at this time. Active problem list reviewed. INR orders are placed. Chart reviewed for pertinent labs, drug/diet interactions, and past doses. Documentation of patient's clinical condition was reviewed. Pharmacy Dosing:  Pharmacy will continue to follow.       Fidelina Rolle PharmD, BCPS   8/5/2022 12:21 PM

## 2022-08-05 NOTE — PROGRESS NOTES
INDIVIDUALIZED OVERALL REHAB PLAN OF CARE  ADDENDUM TO REHAB PROGRESS NOTE-for audit purposes must also refer to this day's clinical note and combine the information      Date: 2022  Patient Name: Venu Stringer   Room: D479/U747-66    MRN: [id number]    : 1932  (80 y.o.)  Gender: female       Today 2022 during weekly team meeting, I reviewed the patient Venu Stringer in detail with the therapists and nurses involved in patient's care gathering complex physiatric data regarding current medical issues, progress in therapies, factors limiting progress, social issues, psychological issues, ongoing therapeutic plans and discharge planning. Legend:  I= independent Im =Modified independent  S=Supervised SB=stand by MAGANA=set up CG=contact fran Min= minimal Mod=Moderate Max=maximal Max of 2 =maximal assist of 2 people      CURRENT FUNCTIONAL STATUS:    Barriers to progress and discharge complex medical conditions, severe pain, and complex social situations      NURSING ISSUES:    We are awaiting surgery's word as to whether we can get the right abdominal drain out. Pt was up in chair- assisted pt to BR to urinate- pt with steady slow purposeful steps-safety oriented with w/w. Pt then changed into hosp gown per request and assisted to bed pt is on 3LO2 per NC- and at home. Drain r abd intact with mininmal serosanguious noted - will empty end of shift. Pt has slept intermittently since then pt up to BR x3 more times till now. Call light with in reach and bedside table with in reach. Nurse management is working on getting alternative versions of translation. Her daughter has been translating because patient has been refusing the video translation service. Apparently there is an issue with the [place] dialect. She has a preference for an old world [place] dialect she is 80years old and apparently that dialect is no longer spoken in the any type of translation service that we can find.   For now we are doing her best with having the daughter present and we are reaching out to any other and all translation services. We have had translators in the past that could not understand or translate this version of [place]. She is able to understand basic [place] so there is the possibility of using that though she has been refusing it. Nursing will continue to focus on bowel and bladder continence transitioning toward independence by time of discharge. Monitoring post void residuals monitoring for severe constipation and bowel obstruction. Bowel function- continent/normal  Plans to address- teach spinal cord style bowel program     Bladder function-  continent    Plans to address- schedule voids before bed and therapy     Skin deficits- dressing changes   Plans to address- special mattress     Hydration/Nutritional deficits- monitoring for dysphagia  Plans to address-Push PO, assist with feeds as needed     Low BP- continue to monitor Ortho BPs  Plans to address- check ortho BPs before therapies-and use abdominal binder and TEDs as needed    Pt and Family training goals-  teach home technology options like Pretty use and home assistive devices      Focus on achieving ADL goals with co-treating with OT when possible. Focus on cognition and co treat with SLP when possible. PHYSICAL THERAPY  Bed mobility:  Bed mobility  Bridging: Stand by assistance (08/04/22 1014)  Rolling to Left: Stand by assistance (08/05/22 1238)  Rolling to Right: Stand by assistance (08/05/22 1238)  Supine to Sit: Stand by assistance (08/05/22 1238)  Sit to Supine: Stand by assistance (08/05/22 1238)  Bed Mobility Comments: increased time and effort to complete all transitions. Hand over hand assist to complete each activity.  (08/04/22 1014)  Supine to Sit  Assistance Level: Stand by assist (08/04/22 1312)  Scooting  Assistance Level: Stand by assist (08/04/22 1312)  Transfers:  Transfers  Sit to Stand: Stand by assistance (08/05/22 1444)  Stand to sit: Stand by assistance (08/05/22 1444)  Bed to Chair: Stand by assistance (08/05/22 1238)  Comment: VCs and demo for handplacement to improve safety and technique with fait follow through. (08/05/22 1444)  Sit to Stand  Assistance Level: Supervision (08/04/22 1312)  Stand to Sit  Assistance Level: Supervision (08/04/22 1312)  Stand Pivot  Assistance Level: Contact guard assist (08/04/22 1312)  Gait:   Ambulation  WB Status: wbat (08/05/22 1443)  Ambulation  Surface: carpet (08/05/22 1443)  Device: Rolling Walker (08/05/22 1443)  Other Apparatus: O2 (08/05/22 1443)  Assistance: Contact guard assistance;Minimal assistance (08/05/22 1124)  Quality of Gait: Assist for management of O2 line. (08/05/22 1443)  Gait Deviations: Slow Raji;Decreased step length (08/05/22 1443)  Distance: 50ftx2 (08/05/22 1443)  Ambulation  Surface: Carpet (08/04/22 1525)  Device: Rolling walker (08/04/22 1525)  Distance: 15' x 2, 25' with a turn each gait. (08/04/22 1525)  Activity: Within Room (08/04/22 1525)  Additional Factors: Set-up; Verbal cues (08/04/22 1525)  Assistance Level: Contact guard assist (08/04/22 1525)  Gait Deviations: Slow raji;Decreased step length bilateral;Decreased heel strike right;Decreased heel strike left; Wide base of support (08/04/22 1525)  Skilled Clinical Factors: Pt with bilateral foot drop and not wearing AFO's this PM.  Pt goes barefoot at home. Pt without LOB with gait. (08/04/22 1525)  Stairs:  Stairs/Curb  Stairs?: Yes (08/05/22 1242)  Stairs  # Steps : 4 (08/05/22 1242)  Stairs Height: 6\" (08/05/22 1242)  Rails: Bilateral (08/05/22 1242)  Assistance: Contact guard assistance (08/05/22 1242)  Stairs  Stair Height: 6'' (08/04/22 1525)  Device: Bilateral handrails (08/04/22 1525)  Number of Stairs: 4 (08/04/22 1525)  Additional Factors: Set-up; Verbal cues; Non-reciprocal going up;Non-reciprocal going down (08/04/22 1525)  Assistance Level: Contact guard assist;Minimal assistance; Moderate assistance (08/04/22 1525)  Skilled Clinical Factors: Pt reuiring CGA with first two steps, but then Min A with third and Mod with fourth. Pt only CGA with descending forward. (08/04/22 1525)  W/C mobility:           Pt and Family training goals-  Focus on sequencing and endurance        OCCUPATIONAL THERAPY  Grooming/Oral Hygiene  Assistance Level: Set-up (08/05/22 1348)  Upper Extremity Bathing  Assistance Level: Supervision (08/05/22 1348)  Skilled Clinical Factors: Patient completed sponge bath seated in w/c at bathrooom sink (08/05/22 1348)  Lower Extremity Bathing  Assistance Level: Minimal assistance (08/05/22 1348)  Skilled Clinical Factors: B feet - CGA for balance (08/05/22 1348)  Upper Extremity Dressing  Assistance Level: Set-up (08/05/22 1348)  Lower Extremity Dressing  Assistance Level: Moderate assistance (08/05/22 1348)  Skilled Clinical Factors: thread R LE x 1/1 - pull up x 1/2 (08/05/22 1348)  Putting On/Taking Off Footwear  Assistance Level: Dependent (08/05/22 1348)  Skilled Clinical Factors: B socks - shoes with braces (08/05/22 1348)      Plans for addressing ADL deficits affecting: Focus on sequencing. Bladder function disrupting functional progress- continent      Plans to address- coordinate scheduled voids before bed and therapy with nursing     Skin deficits- complex wound dressing changes affecting ADLs   Plans to address- coordinate patient dressing changes education with nursing as part of ADL training                SPEECH THERAPY/SLP             Plans for cognitive and communication issues affecting addressing:   Pt and Family training goals-  teach home tecnology options like Pretty use and home assistive devices       Diet/Swallow:                           COGNITION  OT: Cognition Comment: Comp Sup, expression Mod I, social Ind, problem min A, Memory Sup  SP:        Social History     Socioeconomic History    Marital status:       Spouse name: Not on file    Number of children: 2    Years of education: Not on file    Highest education level: Not on file   Occupational History    Not on file   Tobacco Use    Smoking status: Never    Smokeless tobacco: Never   Substance and Sexual Activity    Alcohol use: No     Alcohol/week: 0.0 standard drinks    Drug use: No    Sexual activity: Not on file   Other Topics Concern    Not on file   Social History Narrative    , Lives With: Alone, son lives down the street, dtr is in the area    Type of Home: [address]: One level    Home Access: Stairs to enter with rails- Number of Steps: 2- Rails: Both    Bathroom Shower/Tub: Tub/Shower unit, Bathroom Equipment: Grab bars in shower, Shower chair    Home Equipment: Rolling walker, Cane(Pt infrequemtly uses DME for ambulation and prefers to furniture walk in home)    ADL Assistance: Independent, [address]: [place] [address] Responsibilities: Yes    Ambulation Assistance: Independent, Transfer Assistance: Independent    Additional Comments: Son stops over 2 times daily         Social Determinants of Health     Financial Resource Strain: Not on file   Food Insecurity: Not on file   Transportation Needs: Not on file   Physical Activity: Not on file   Stress: Not on file   Social Connections: Not on file   Intimate Partner Violence: Not on file   Housing Stability: Not on file           THERAPY, [address]:    [x]  Pain medication before therapies     [x]  Check orthostatic BP and monitor heart rate and medications effects with therapy      [x]  Ambulate to the bathroom in room    [x]  Add scheduled rest beaks     [x]  In room therapies as needed      Discharge date set for:               August 17, 2022      Home with:    Alone vs with dtr  with help from   dtr  and son          And:     [address]:     [x]  PT    []  OT    []  ST   [x]  Aide   []  SW    [x]  RN               Or preferably Outpatient Therapy:  []  PT    []  OT    []  ST   []  Rehab Psych                 Equipment:  WW      At D/C their function is goaled at:   PT:Long term goal 1: Pt to complete bed mobility with indep  Long term goal 2: Pt to complete transfers with indep  Long term goal 3: Pt to ambulate 50ft with LRD and indep  Long term goal 4: Pt to manage 2 steps with B HR and Supervision  Long term goal 5: Pt to complete TUG within 20sec to demonstrate improved functional mobility/balance  OT: ,  ,  ,     ,  ,  ,    SP:               From a cognitive standpoint they will need:        24 hr supervision  --progress to occasional            Significant problems/ barriers to functional progress include: Pt is at a high risk for functional loss,      [x]  Acute infection/UTI    []  Low BP's     []  COPD flare-up   []  Uncontrolled blood sugar     []  Progressive anemia     [x]  poor endurance    [x]  Severe pain exacerbation     [x]  Impaired mental status-mild    []  Urinary incontinence    []  Bowel incontinence           Plan to correct barriers to functional progress: Add scheduled rest breaks, CoQ 10 and Vit B 12, control pain by using ice Lidoderm rest and massage as well as pain medications prior to therapy. Spread therapy of 15 hours out over a 7 day window to accommodate rest breaks and medical interventions. Patient seems to be making fair to good response to these interventions. Based on a comprehensive evaluation of the above, the individualized therapy and Discharge plan will be:    -Times stated are an average that will be varied based on the patient's daily need.        PT    1 1/2  hrs/day 5-7 days per week      OT    1 1/2 hrs per day 5-7 days per week     ST     1/2    hrs /day 3-5 days per week       Estimated LOS   2 week(s)    - Overall functional prognosis:     [x]  Good    []  Fair    []  Poor -Medical Prognosis:   [x]  Good    []  Fair    []  Poor    This patient was made aware of the discussion

## 2022-08-05 NOTE — PROGRESS NOTES
Progress Note  Patient: Roxana Anthony  Unit/Bed: R250/R250-01  YOB: 1932  MRN: [id number]  Acct: [de-identified]   Admitting Diagnosis: Impaired mobility and activities of daily living [Z74.09, Z78.9]  Impaired mobility and ADLs [Z74.09, Z78.9]  Admit Date:  8/3/2022  Hospital Day: 2    Chief Complaint:  CAD    Histories:  Past Medical History:   Diagnosis Date    Ascending aortic aneurysm ([address] 6/23/2017    Charcot Arleen Tooth muscular atrophy     CHF (congestive heart failure) (HCC)     Chronic diastolic CHF (congestive heart failure) ([address] 4/1/2022    Depression     Descending aortic aneurysm ([facility] 6/23/2017    Essential hypertension 2/16/2018    Headache     HTN (hypertension)     Impaired mobility and activities of daily living     Lumbar stenosis with neurogenic claudication     Myelopathy ([address]     Osteoarthritis      Past Surgical History:   Procedure Laterality Date    JOINT REPLACEMENT Bilateral     knees    OTHER SURGICAL HISTORY Right 07/21/2022    cat scan guided abdominal mass aspiration with drain placement by Dr. Charles Plan Left 02/25/2021    LEFT PLEURAL CATHETER INSERTION performed by Jessi Aburto MD at [place] [address] Left 01/29/2021    total of 755 cc removed per Dr Lance Braun specimen sent to lab     Family History   Problem Relation Age of Onset    Arthritis Mother     Arthritis Father     High Blood Pressure Father      Social History     Socioeconomic History    Marital status:       Spouse name: None    Number of children: 2    Years of education: None    Highest education level: None   Tobacco Use    Smoking status: Never    Smokeless tobacco: Never   Substance and Sexual Activity    Alcohol use: No     Alcohol/week: 0.0 standard drinks    Drug use: No   Social History Narrative    , Lives With: Alone, son lives down the street, dtr is in the area    Type of Home: [facility]  in [address]: One level    Home Access: Stairs to enter with rails- Number of Steps: 2- Rails: Both    Bathroom Shower/Tub: Tub/Shower unit, Bathroom Equipment: Grab bars in shower, Shower chair    Home Equipment: Rolling walker, Cane(Pt infrequemtly uses DME for ambulation and prefers to furniture walk in home)    ADL Assistance: [address]: Independent    Homemaking Responsibilities: Yes    Ambulation Assistance: Independent, Transfer Assistance: Independent    Additional Comments: Son stops over 2 times daily           Subjective/HPI daughter in room. No new events doing well eating well no cp less dizzy w Meclizine. BP has been well controlled but elevated this am.     EKG:        Review of Systems:   Review of Systems   Constitutional: Negative. Negative for diaphoresis and fatigue. HENT: Negative. Eyes: Negative. Respiratory: Negative. Negative for cough, chest tightness, shortness of breath, wheezing and stridor. Cardiovascular: Negative. Negative for chest pain, palpitations and leg swelling. Gastrointestinal: Negative. Negative for blood in stool and nausea. Genitourinary: Negative. Musculoskeletal:  Positive for arthralgias, back pain and gait problem. Skin: Negative. Neurological:  Positive for weakness. Negative for dizziness, syncope and light-headedness. Hematological: Negative. Psychiatric/Behavioral: Negative. Physical Examination:    BP (!) 161/101   Pulse 77   Temp 98.6 °F (37 °C)   Resp 18   Ht 5' (1.524 m)   Wt 164 lb (74.4 kg)   SpO2 94%   BMI 32.03 kg/m²    Physical Exam   Constitutional: No distress. She appears chronically ill. HENT:   Normal cephalic and Atraumatic   Eyes: Pupils are equal, round, and reactive to light. Neck: Thyroid normal. No JVD present. No neck adenopathy. No thyromegaly present. Cardiovascular: Normal rate, regular rhythm, intact distal pulses and normal pulses. Murmur heard.   Pulmonary/Chest: Effort normal and breath sounds normal. She has no wheezes. She has no rales. She exhibits no tenderness. Abdominal: Soft. Bowel sounds are normal. There is no abdominal tenderness. Musculoskeletal:         General: No tenderness or edema. Normal range of motion. Cervical back: Normal range of motion and neck supple. Neurological: She is alert and oriented to person, place, and time. Skin: Skin is warm. No cyanosis. Nails show no clubbing.      LABS:  CBC:   Lab Results   Component Value Date/Time    WBC 6.2 08/04/2022 03:28 AM    RBC 3.62 08/04/2022 03:28 AM    HGB 10.1 08/04/2022 03:28 AM    HCT 30.4 08/04/2022 03:28 AM    MCV 83.9 08/04/2022 03:28 AM    MCH 27.9 08/04/2022 03:28 AM    MCHC 33.3 08/04/2022 03:28 AM    RDW 15.8 08/04/2022 03:28 AM     08/04/2022 03:28 AM     CBC with Differential:    Lab Results   Component Value Date/Time    WBC 6.2 08/04/2022 03:28 AM    RBC 3.62 08/04/2022 03:28 AM    HGB 10.1 08/04/2022 03:28 AM    HCT 30.4 08/04/2022 03:28 AM     08/04/2022 03:28 AM    MCV 83.9 08/04/2022 03:28 AM    MCH 27.9 08/04/2022 03:28 AM    MCHC 33.3 08/04/2022 03:28 AM    RDW 15.8 08/04/2022 03:28 AM    BANDSPCT 2 07/24/2022 05:00 AM    METASPCT 2 07/24/2022 05:00 AM    LYMPHOPCT 7.0 07/24/2022 05:00 AM    MONOPCT 5.8 07/24/2022 05:00 AM    BASOPCT 1.0 07/24/2022 05:00 AM    MONOSABS 0.7 07/24/2022 05:00 AM    LYMPHSABS 0.8 07/24/2022 05:00 AM    EOSABS 0.0 07/24/2022 05:00 AM    BASOSABS 0.1 07/24/2022 05:00 AM     CMP:    Lab Results   Component Value Date/Time     08/04/2022 03:28 AM    K 3.9 08/04/2022 03:28 AM    K 3.2 07/24/2022 05:00 AM     08/04/2022 03:28 AM    CO2 27 08/04/2022 03:28 AM    BUN 14 08/04/2022 03:28 AM    CREATININE 0.56 08/04/2022 03:28 AM    GFRAA >60.0 08/04/2022 03:28 AM    LABGLOM >60.0 08/04/2022 03:28 AM    GLUCOSE 110 08/04/2022 03:28 AM    PROT 6.0 07/24/2022 05:00 AM    LABALBU 3.0 08/04/2022 03:28 AM    CALCIUM 8.8 08/04/2022 03:28 AM    BILITOT 0.3 07/24/2022 05:00 AM    ALKPHOS 51 07/24/2022 05:00 AM    AST 20 07/24/2022 05:00 AM    ALT 19 07/24/2022 05:00 AM     BMP:    Lab Results   Component Value Date/Time     08/04/2022 03:28 AM    K 3.9 08/04/2022 03:28 AM    K 3.2 07/24/2022 05:00 AM     08/04/2022 03:28 AM    CO2 27 08/04/2022 03:28 AM    BUN 14 08/04/2022 03:28 AM    LABALBU 3.0 08/04/2022 03:28 AM    CREATININE 0.56 08/04/2022 03:28 AM    CALCIUM 8.8 08/04/2022 03:28 AM    GFRAA >60.0 08/04/2022 03:28 AM    LABGLOM >60.0 08/04/2022 03:28 AM    GLUCOSE 110 08/04/2022 03:28 AM     Magnesium:    Lab Results   Component Value Date/Time    MG 2.1 08/03/2022 05:18 AM     Troponin:    Lab Results   Component Value Date/Time    TROPONINI <0.010 07/18/2022 12:30 PM        Active Hospital Problems    Diagnosis Date Noted    A-fib [facility]) [I48.91]      Priority: High    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 [Z74.09, Z78.9]      Priority: High    Lumbar stenosis with neurogenic claudication [M48.062] 06/02/2016     Priority: Medium    Chronic diastolic CHF (congestive heart failure) ([facility] [I50.32] 04/01/2022     Priority: Low    Acute on chronic combined systolic and diastolic CHF (congestive heart failure) ([facility] [I50.43] 01/25/2021     Priority: Low    Descending aortic aneurysm ([facility] [I71.9] 06/23/2017     Priority: Low        Assessment/Plan:  Impression/Plan:   Abd Abscess/Mass - s/p Drainage  LVEF 60  Symptoms of Orthopnea- lungs are clear no JVD nor peripheral edema. - will give Empiric low dose Lasix. Will need periodic Lab at Rehab  Frequent PAF - rate is controlled on exam.  CAD- moderate - no angina. Continue BB  HTN Stable but elevated this am. Will observe. HPL - statin on hold  Ascending AO aneurysm-  had increased in size and measures 5.3cm distal arch. 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 Xarelto stopped and started Heparin. Had long discussion with pt and daughter. They no longer want aggressive Rx and does not want to be transferred to [facility]. Conitnue Lovenox until INR therapeutic. Daily INR.   Advance Warfarin dose to 7.5         Electronically signed by Beatriz Osborn MD on 8/5/2022 at 11:38 AM

## 2022-08-05 NOTE — PROGRESS NOTES
breaks as needed. ASSESSMENT: Patient worked at a steady pace. Activity Tolerance: Patient tolerated treatment well      PLAN OF CARE:  Strengthening, Balance training, Functional mobility training, Neuromuscular re-education, Endurance training, Self-Care / ADL, Cognitive/Perceptual training, Safety education & training, Home management training, Patient/Caregiver education & training     Continue per OT POC    Patient goals : \"to be able to wash my back and my feet. \"  Time Frame for Long term goals : 2 weeks  Long Term Goal 1: Pt within two weeks of evaluation will demonstrate progress to goals to address areas as stated below to increase functional independence for return to PLOF  Long Term Goal 2: Pt will increase independence with ADL Pt will increase independence with ADL transfers  Long Term Goal 3: Pt will increase bilateral UE strength to increase ease of ADL transfers        Therapy Time:   Individual Group Co-Treat   Time In 1000       Time Out 1100         Minutes 60                   ADL/IADL trainin minutes  Therapeutic activities: 10 minutes     Electronically signed by:    PRUDENCE Lepe,   2022, 1:51 PM

## 2022-08-05 NOTE — PROGRESS NOTES
Subjective: The patient complains of severe acute on chronic progressive fatigue and  RLQ abdominal pain partially relieved by rest, medications, PT,  OT,     and rest and exacerbated by recent illness -she was initially admitted through the ER at [facility] complaining of abdominal pain for the past 5 to 7 days. She also complained of headache and dizziness. Imaging revealed an abdominal mass felt to be either cancer or an abscess. Imaging was thinking it was more like an abscess. She was admitted to [facility] placed on antibiotics and a drain was placed under interventional radiology's guidance. She was transferred off the rehab unit because of findings on a CT abdomen which showed aneurysm and clots however she was felt to be nonoperative given her age and complex medical status. Her family does not want to pursue aggressive treatment and she is very glad to be back on the rehabilitation unit and is medically stable and ready to participate. She still has her abdominal drain and I discussed with them that interventional radiology who had been under the impression the patient was going to the [facility] for further operative care. Now that she is not we will touch base with general surgery to see if they would be comfortable with this getting it out. .      I am concerned about patients medical complexities and barriers to advancing in rehab goals including  drain for Abd abscess. .        I reviewed current care and plans for further care with other rehab providers including nursing and case management. According to recent nursing note, \" pt was up in chair- assisted pt to BR to urinate- pt with steady slow purposeful steps-safety oriented with w/w. Pt then changed into hosp gown per request and assisted to bed pt is on 3LO2 per NC- and at home. Drain r abd intact with mininmal serosanguious noted - will empty end of shift.  Pt has slept intermittently since then pt up to BR x3 more times till now. Call light with in reach and bedside table with in reach. \".    ROS x10: The patient also complains of severely impaired mobility and activities of daily living. Otherwise no new problems with vision, hearing, nose, mouth, throat, dermal, cardiovascular, GI, , pulmonary, musculoskeletal, psychiatric or neurological. See also Acute Rehab PM&R H&P. Vital signs:  BP (!) 148/88   Pulse 70   Temp 97.6 °F (36.4 °C)   Resp 18   Ht 5' (1.524 m)   Wt 164 lb (74.4 kg)   SpO2 99%   BMI 32.03 kg/m²   I/O:   PO/Intake:  fair PO intake,   Reg diet    Bowel:   continent   Bladder: continent   no taylor-co nocturia  General:  Patient is well developed,   adequately nourished, and    well kempt. HEENT:    Pupils equal, hearing intact to loud voice, external inspection of ear and nose benign. Inspection of lips, tongue and gums benign    Musculoskeletal: No significant change in strength or tone. All joints stable. Inspection and palpation of digits and nails show no clubbing, cyanosis or inflammatory conditions. Neuro/Psychiatric: Affect: flat but pleasant. Alert and oriented to person, place and situation with  no needded cues. No significant change in deep tendon reflexes or sensation  Lungs:  Diminished, CTA-B. Respiration effort is   normal at rest.     Heart:   S1 = S2,    irreg. Abdomen:  Soft, RLQ-tender, no enlargement of liver or spleen. Extremities:    lower extremity edema    Skin:   Intact to general survey,    right lower drain site    Rehabilitation:  Physical Therapy:   Bed mobility:  Bed mobility  Bridging: Stand by assistance (08/04/22 1014)  Rolling to Left: Contact guard assistance (08/04/22 1014)  Rolling to Right: Minimal assistance (08/04/22 1014)  Supine to Sit: Minimal assistance (08/04/22 1014)  Sit to Supine: Contact guard assistance (08/04/22 1014)  Bed Mobility Comments: increased time and effort to complete all transitions.  Hand over hand assist to complete each activity. (08/04/22 1014)  Supine to Sit  Assistance Level: Stand by assist (08/04/22 1312)  Scooting  Assistance Level: Stand by assist (08/04/22 1312)  Transfers:  Transfers  Sit to Stand: Contact guard assistance (08/04/22 1017)  Stand to sit: Contact guard assistance (08/04/22 1017)  Bed to Chair: Contact guard assistance (08/04/22 1017)  Comment: Pt reaches out for HHA for each transition. Directed in correct hand placement however minimal follow through. Improves with [employer] (08/04/22 1017)  Sit to Stand  Assistance Level: Supervision (08/04/22 1312)  Stand to Sit  Assistance Level: Supervision (08/04/22 1312)  Stand Pivot  Assistance Level: Contact guard assist (08/04/22 1312)  Gait:   Ambulation  WB Status: wbat (08/04/22 1018)  Ambulation  Surface: level tile (08/04/22 1018)  Device: Hand-Held Assist;Single point cane;Rolling Walker (08/04/22 1018)  Other Apparatus: O2 (08/04/22 1018)  Assistance: Moderate assistance;Contact guard assistance (08/04/22 1018)  Quality of Gait: ModA to ambulate with SPC and HHA. Steppage pattern B LEs d/t foot drop. Pt with significant ant/post instability noted. Reaches out at times for furniture and therapist support. Cues for redirection with minimal follow through. Improved balance and gait pattern with [employer]. Verbal cues for management of obstacles with WW. (08/04/22 1018)  Distance: 15ft X 2 (08/04/22 1018)  Ambulation  Surface: Carpet (08/04/22 1525)  Device: Rolling walker (08/04/22 1525)  Distance: 15' x 2, 25' with a turn each gait. (08/04/22 1525)  Activity: Within Room (08/04/22 1525)  Additional Factors: Set-up; Verbal cues (08/04/22 1525)  Assistance Level: Contact guard assist (08/04/22 1525)  Gait Deviations: Slow raji;Decreased step length bilateral;Decreased heel strike right;Decreased heel strike left; Wide base of support (08/04/22 1525)  Skilled Clinical Factors: Pt with bilateral foot drop and not wearing AFO's this PM.  Pt goes barefoot at home. Pt without LOB with gait. (08/04/22 1525)  Stairs:  Stairs/Curb  Stairs?: No (environmental limitations) (08/04/22 1018)  Stairs  Stair Height: 6'' (08/04/22 1525)  Device: Bilateral handrails (08/04/22 1525)  Number of Stairs: 4 (08/04/22 1525)  Additional Factors: Set-up; Verbal cues; Non-reciprocal going up;Non-reciprocal going down (08/04/22 1525)  Assistance Level: Contact guard assist;Minimal assistance; Moderate assistance (08/04/22 1525)  Skilled Clinical Factors: Pt reuiring CGA with first two steps, but then Min A with third and Mod with fourth. Pt only CGA with descending forward. (08/04/22 1525)  W/C mobility:       Occupational Therapy:   Hand Dominance: Right  ADL  Feeding: Setup (08/04/22 1114)  Grooming: Setup; Increased time to complete (08/04/22 1114)  UE Bathing: Stand by assistance; Increased time to complete (08/04/22 1114)  LE Bathing: Moderate assistance; Increased time to complete (08/04/22 1114)  UE Dressing: Contact guard assistance; Increased time to complete (08/04/22 1114)  LE Dressing: Maximum assistance; Increased time to complete (08/04/22 1114)  Toileting: Maximum assistance (08/04/22 1114)  Toilet Transfers  Toilet - Technique: Stand step (08/04/22 1115)  Equipment Used: Grab bars (08/04/22 1115)  Toilet Transfer: Minimal assistance (08/04/22 1115)  Tub Transfers  Tub Transfers: Not tested (08/04/22 1115)  Shower Transfers  Shower - Transfer From: Wheelchair (08/04/22 1115)  Shower - Transfer Type: To and From (08/04/22 1115)  Shower - Transfer To:  Shower seat with back (08/04/22 1115)  Shower - Technique: Stand pivot (08/04/22 1115)  Shower Transfers: Minimal assistance (08/04/22 1115)    Speech Therapy:            Diet/Swallow:                      Lab/X-ray studies reviewed, analyzed and discussed with patient and staff:   Recent Results (from the past 24 hour(s))   Protime-INR    Collection Time: 08/04/22  4:45 PM   Result Value Ref Range    Protime 13.8 12.3 - 14.9 sec    INR 1.1 CT ABDOMEN PELVIS  7/29/2022  1. The previously placed abscess drain in the right lower quadrant appears to be in the periphery of the abscess with loculations now more apparent in the fluid collection. The fluid collection appears to be grossly the same size as prior to drainage. 2.New mural thrombus is noted in the distal aortic arch extending to the lower thoracic aorta which was not seen on prior study. Findings discussed with Dr. Jarcoho Londono 3. Note is again made of an aortic aneurysm involving the ascending aorta, aortic arch, and descending aorta. Based on current images, the ascending aorta appears to be 2 mm larger when compared to study dating February 17, 2021. The ascending aorta now measures 5.5 cm. Previously the arch measured 5.3 cm. 4.Note is again made of left lower small pleural effusion with bilateral basilar opacities which may represent atelectasis. 5.Note is again made of a large cystic mass in the pelvis. COMPARISON: CT dating February 17, 2021, July 18, 2022, and July 21, 2022 DIAGNOSIS: Previous cystic lesion adjacent to the ascending colon status post drain placement     s contrast. CT Dose-Length Product (estimate related to radiation exposure from this exam):  1386.22  mGy*cm. CT ABDOMEN: Bibasilar atelectasis with left small pleural effusion. Underlying nodules cannot be excluded. The heart is enlarged. The liver is of normal size and attenuation without focal lesions. The spleen is of normal size and attenuation without focal lesions. Pancreas shows no signs of focal mass or peripancreatic fluid collection. Kidneys are normal in size. There is no hydronephrosis. No renal mass is identified. Adrenal glands are unremarkable. Note is again made of a thoracoabdominal aortic aneurysm. The ascending aorta now measures 5.5 cm, this is larger when compared to prior study dating 5.3 cm.  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 This large thrombus was not seen on prior study. No significant retroperitoneal adenopathy or ascites is identified. Again seen is a cystic lesion involving the proximal ascending aorta which now appears to be more septated than prior. The previously placed drain is now in the periphery of the lesion. CT PELVIS:  A large cystic mass is again seen in the lower pelvis. CT ABDOMEN PELVIS  : 7/18/2022    Residual intravenous contrast medium from recent CT examination identified. Lungs:  Lung bases clear. Cardiac size enlarged, unchanged. Calcification at level of mitral valve. Small hiatal hernia. Liver:  Normal in size, shape, and attenuation. Bile Ducts:  Normal in caliber. Gallbladder:  No stones or wall thickening. Pancreas:  Normal without masses, cysts, ductal dilatation or calcification. Spleen:  Normal in size without masses or calcifications. No splenules. Kidneys:  Normal in size. No hydronephrosis, masses, or stones. Adrenals:  Normal. Small bowel:  Normal in caliber. See \"peritoneum \". Appendix:  Not visualized. Colon:  Normal in caliber. See \"peritoneum \". Peritoneum:  No free air. A bilobed, 10.0 x 8.0 x 10.6 cm mass having central rounded septated areas of low attenuation, 3.7 x 3.5 x 5.4 cm, and displacing cecum and ascending colon anteriorly (series 2, image 50, series 601, image 54, series 602, image 36). No calcification identified. Trace adjacent fat stranding demonstrated. Vessels: Aorta normal in course and caliber. Lymph nodes:  Retroperitoneal:  No enlarged retroperitoneal lymph nodes. Mesenteric:  No enlarged mesenteric lymph nodes. Pelvic: No enlarged pelvic lymph nodes. Ureters: Normal in course and caliber. No calcifications. Bladder: No wall thickening.  Reproductive organs: 8.0 x 5.8 x 7.5 cm left adnexal cyst displacing urinary bladder right and laterally, anteriorly, and inferiorly (series 2, image 68, series 601, image 42, series 602, image 52). Abdominal Wall: Fat identified bilateral inguinal canals. Bones:  No bone lesions. Multilevel disc space narrowing lumbar spine. No post operative changes. Complex 10.0 x 8.0 x 10.6 cm mass with septated central low attenuation fluid collections as discussed. Mass displaces cecum and ascending colon anteriorly. Differential includes malignancy including appendiceal mucocele/carcinoma, as well as retroperitoneal sarcoma with central necrotic change. Infection/abscess secondary consideration, minimal presence of adjacent inflammatory change. 8.0 x 5.8 x 7.5 cm left adnexal cysts as discussed, most likely ovarian in etiology. If clinical concern warrants, pelvic sonography may be obtained for further evaluation. Other findings discussed. CT Head   7/18/2022     No acute changes are noted. There is no evidence for mass, mass effect or midline shift. No abnormal extra-axial fluid collections are appreciated. Age-appropriate cortical volume loss is seen. There are patchy areas of hypoattenuation in a sub-cortical, central white and periventricular distribution consistent with nonspecific ischemic small vessel disease. Calcifications are again seen in the basal ganglia. There are no acute parenchymal alterations, blood byproducts or abnormal extracerebral fluid. The calvarium is grossly intact. The visualized paranasal sinuses show mucoceles or polyps in the maxillary sinuses. Mild fluid is seen in the mastoid air cells that is not significantly changed and is consistent with chronic changes. Pinky Angles 1.Age-appropriate cortical atrophy and periventricular microangiopathy. When compared to previous study there has been no significant interval change. 2. No acute hemorrhage or extra-axial fluid collection            CT ABDOMEN PELVIS   7/18/2022   Impression: 1. In the right lower quadrant of the abdomen, pericolic abscesses versus pericolic mass is seen. This is thought more likely abscess.  2. A cystic mass is again seen in the pelvis and is likely in the ovary. It is unchanged from previous examination The patient will be given oral contrast and rescanned to evaluate the right lower quadrant. All CT scans at this facility use dose modulation, iterative reconstruction, and/or weight based dosing        Previous extensive, complex labs, notes and diagnostics reviewed and analyzed. ALLERGIES:    Allergies as of 08/03/2022 - Fully Reviewed 08/03/2022   Allergen Reaction Noted    Latex  07/19/2017    Tape [adhesive tape]  06/28/2016      (please also verify by checking MAR)     Today I evaluated this patient for periodic reassessment of medical and functional status. The patient was discussed in detail at the treatment team meeting focusing on current medical issues, progress in therapies, social issues, psychological issues, barriers to progress and strategies to address these barriers, and discharge planning. See the addendum to rehab progress note-as a second progress note in the chart. The patient continues to be high risk for future disability and their medical and rehabilitation prognosis continue to be good and therefore, we will continue the patient's rehabilitation course as planned. The patient's tentative discharge date was set. Patient and family education was discussed. The patient was made aware of the team discussion regarding their progress. 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 Functional and medical status reassessed regarding patients ability to participate in therapies and patient found to be able to participate in acute intensive comprehensive inpatient rehabilitation program including PT/OT to improve balance, ambulation, ADLs, and to improve the P/AROM.   Therapeutic modifications regarding activities in therapies, place, amount of time per day and intensity of therapy made daily. In bed therapies or bedside therapies prn. Bowel and Bladder dysfunction nocturia , Neurogenic bowel and bladder:  frequent toileting, ambulate to bathroom with assistance, check post void residuals. Check for C.difficile x1 if >2 loose stools in 24 hours, continue bowel & bladder program.  Monitor bowel and bladder function. Lactinex 2 PO every AC. MOM prn, Brown Bomb prn, Glycerin suppository prn, enema prn. Encourage therapy and nursing to co-treat and problem solve re continence. Severe right Abd  pain as well as generalized OA pain: reassess pain every shift and prior to and after each therapy session, give prn Tylenol and consider scheduled Tylenol, modalities prn in therapy, masage, Lidoderm, K-pad prn. Consider scheduled AM pain meds. Skin healing right abd and breakdown risk:  continue pressure relief program.  Daily skin exams and reports from nursing. Fatigue due to nutritional and hydration deficiency: Add and titrate vitamin B12 vitamin D and CoQ10 continue to monitor I&Os, calorie counts prn, dietary consult prn. Add healthy snack at night. Acute episodic insomnia with situational adjustment disorder:  prn Ambien, monitor for day time sedation. Falls risk elevated:  patient to use call light to get nursing assistance to get up, bed and chair alarm. Elevated DVT risk: progressive activities in PT, continue prophylaxis PORTILLO hose, elevation and  Lovenox to Coumadin . Complex discharge planning:  Weekly team meeting every Monday to re-assess progress towards goals, discuss and address social, psychological and medical comorbidities and to address difficulties they may be having progressing in therapy. Patient and family education is in progress. The patient is to follow-up with their family physician after discharge.         Complex Active General Medical Issues that complicate care Assess & Plan:     A-fib,   Essential hypertension, Acute on chronic combined systolic (congestive) and diastolic (congestive) heart failure-Acute rehab to monitor heart rate and rhythm with the option of telemetry and the effects of chronotropic medication with respect to increasing physical activity and exercise in PT, OT, ADLs with medication titration to lowest effective dosing. Continue blood signs every shift focusing on heart rate, rhythm and blood pressure checks with orthostatic checks-monitoring the effect of exercise, therapy and posture. Consult hospitalist for backup medical and adjust/add medications (Lovenox transitioning to Coumadin patient had been on Xarelto, Coreg). Monitor heart rate and blood pressure as well as medications effects on vital signs before during and after therapy with especial focus on preventing orthostasis and falls risk. Hyponatremia-recheck BMP avoid excessive water  Anemia-Monitor vital signs monitor for orthostasis and tachycardia, check H&H prn. Vitamin B12 and iron-dose iron with food to prevent GI upset. Monitor for constipation. Monitor stools for blood. Hypothyroid-  Synthroid and titrate dosing. Follow vital signs, recheck TSH and thyroid studies as outpatient. 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 Osteoarthritis of lumbar spine with myelopathy-her extremity strengthening    Depression-emotional support provided daily, vitamin B12, encourage participation in rehabilitation support group and recreational therapy, adjust/add medications (B12, Celexa)  Abdominal mass abscess versus cancer-drain still in place pending cytology-patient is on IV Zosyn pending cultures-Pt has right abd abscess/mass. S/p drainage. 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   GERD-Elevate head of bed after meals, monitor stools for blood, lowest effective dose of PPI, consider Tums.  Yeast rash and immunosuppression-Micatin powder and Floranex   AA now at 5.5cm up from 5.3cm but now there is a new large mural thrombus involving the descending portion of the aortic arch and extending how to the lower thoracic aorta along with large cystic mass. The thrombus was not seen on previous study. Patient had been on xarelto and now on Lovenox transitioning to Coumadin. . Given her abdominal pathology and large thrombus however patient due to her advanced age and high risk for any surgical intervention she is electing for conservative care. Focus of today's plan-  Initiate and modify therapuetic plan to meet patients individual needs, add rest breaks as needed, and  consult surgery re abscess mx.  Recheck UA     Electronically signed by Jose De Jesus DO on 8/5/22 at 4:51 AM KIRA Sarkar D.O., PM&R     Attending    [address]

## 2022-08-05 NOTE — PLAN OF CARE
Problem: Discharge Planning  Goal: Discharge to home or other facility with appropriate resources  Outcome: Progressing     Problem: ABCDS Injury Assessment  Goal: Absence of physical injury  Outcome: Progressing     Problem: Skin/Tissue Integrity  Goal: Absence of new skin breakdown  Description: 1. Monitor for areas of redness and/or skin breakdown  2. Assess vascular access sites hourly  3. Every 4-6 hours minimum:  Change oxygen saturation probe site  4. Every 4-6 hours:  If on nasal continuous positive airway pressure, respiratory therapy assess nares and determine need for appliance change or resting period.   Outcome: Progressing     Problem: Chronic Conditions and Co-morbidities  Goal: Patient's chronic conditions and co-morbidity symptoms are monitored and maintained or improved  Outcome: Progressing

## 2022-08-05 NOTE — PLAN OF CARE
Problem: Discharge Planning  Goal: Discharge to home or other facility with appropriate resources  8/4/2022 2014 by Bebeto Vaughn RN  Outcome: Progressing  8/4/2022 1115 by John Nicole RN  Outcome: Progressing     Problem: ABCDS Injury Assessment  Goal: Absence of physical injury  8/4/2022 2014 by Bebeto Vaughn RN  Outcome: Progressing  8/4/2022 1115 by John Nicole RN  Outcome: Progressing     Problem: Skin/Tissue Integrity  Goal: Absence of new skin breakdown  Description: 1. Monitor for areas of redness and/or skin breakdown  2. Assess vascular access sites hourly  3. Every 4-6 hours minimum:  Change oxygen saturation probe site  4. Every 4-6 hours:  If on nasal continuous positive airway pressure, respiratory therapy assess nares and determine need for appliance change or resting period. 8/4/2022 2014 by Bebeto Vaughn RN  Outcome: Progressing  8/4/2022 1115 by John Nicole RN  Outcome: Progressing     Problem: Chronic Conditions and Co-morbidities  Goal: Patient's chronic conditions and co-morbidity symptoms are monitored and maintained or improved  8/4/2022 2014 by Bebeto Vaughn RN  Outcome: Progressing  8/4/2022 1115 by John Nicole RN  Outcome: Progressing     Problem: Physical Therapy - Adult  Goal: By Discharge: Performs mobility at highest level of function for planned discharge setting. See evaluation for individualized goals.   8/4/2022 1246 by Ondina Nassar PT  Outcome: Progressing

## 2022-08-05 NOTE — PROGRESS NOTES
Physical Therapy Rehab Treatment Note  Facility/Department: [facility]  [facility]       NAME: Venkata Nelson  : 1932 (80 y.o.)  MRN: [id number]  CODE STATUS: DNR-CC    Date of Service: 2022       Restrictions:  Restrictions/Precautions: Fall Risk       SUBJECTIVE:   Subjective: Pt states she is ok. Daughter reports pt had a dizzy spell this morning but had some medication. Pain  Pain: Denies pre and post session pain. OBJECTIVE:         Bed mobility  Rolling to Left: Stand by assistance  Rolling to Right: Stand by assistance  Supine to Sit: Stand by assistance  Sit to Supine: Stand by assistance    Transfers  Sit to Stand: Stand by assistance  Stand to sit: Contact guard assistance  Bed to Chair: Stand by assistance  Comment: Instructed pt on handplacement to improve safety and technique with fait follow through. Ambulation  WB Status: wbat  Ambulation  Surface: level tile  Device: Rolling Walker  Other Apparatus: O2  Assistance: Contact guard assistance;Minimal assistance  Quality of Gait: Decreased safety with approach to chair becoming fearful. Required min assist and VCs to sit safely. Gait Deviations: Slow Lea;Decreased step length  Distance: 50ftx2  Stairs/Curb  Stairs?: Yes  Stairs  # Steps : 4  Stairs Height: 6\"  Rails: Bilateral  Assistance: Contact guard assistance        PT Exercises  A/AROM Exercises: bridges x10, supine march x10,  hooklying hip abd RTB x20, hooklying hip add x20 with ball, SAQ x20  Functional Mobility [employer] STS            ASSESSMENT/PROGRESS TOWARDS GOALS:   Assessment: Decreased follow through of safety cues with transfers. Fatigued after gait ans stairs.       Goals:  Long Term Goals  Long term goal 1: Pt to complete bed mobility with indep  Long term goal 2: Pt to complete transfers with indep  Long term goal 3: Pt to ambulate 50ft with LRD and indep  Long term goal 4: Pt to manage 2 steps with B HR and Supervision  Long

## 2022-08-05 NOTE — CARE COORDINATION
Patient and son were updated with projected discharge date of 8/17, LSW also emphasized need for son to provide more supervision upon discharge and for pt to use ww to ensure safety.  Electronically signed by KIN De Anda, JANETW on 8/5/2022 at 4:37 PM

## 2022-08-06 LAB
INR BLD: 1.4
PROTHROMBIN TIME: 16.6 SEC (ref 12.3–14.9)

## 2022-08-06 PROCEDURE — 2580000003 HC RX 258: Performed by: INTERNAL MEDICINE

## 2022-08-06 PROCEDURE — 6370000000 HC RX 637 (ALT 250 FOR IP): Performed by: INTERNAL MEDICINE

## 2022-08-06 PROCEDURE — 97535 SELF CARE MNGMENT TRAINING: CPT

## 2022-08-06 PROCEDURE — 97110 THERAPEUTIC EXERCISES: CPT

## 2022-08-06 PROCEDURE — 97116 GAIT TRAINING THERAPY: CPT

## 2022-08-06 PROCEDURE — 36415 COLL VENOUS BLD VENIPUNCTURE: CPT

## 2022-08-06 PROCEDURE — 6360000002 HC RX W HCPCS: Performed by: INTERNAL MEDICINE

## 2022-08-06 PROCEDURE — 1180000000 HC REHAB R&B

## 2022-08-06 PROCEDURE — 99232 SBSQ HOSP IP/OBS MODERATE 35: CPT | Performed by: PHYSICAL MEDICINE & REHABILITATION

## 2022-08-06 PROCEDURE — 97129 THER IVNTJ 1ST 15 MIN: CPT

## 2022-08-06 PROCEDURE — 85610 PROTHROMBIN TIME: CPT

## 2022-08-06 PROCEDURE — 6370000000 HC RX 637 (ALT 250 FOR IP): Performed by: PHYSICAL MEDICINE & REHABILITATION

## 2022-08-06 RX ORDER — WARFARIN SODIUM 5 MG/1
7.5 TABLET ORAL
Status: COMPLETED | OUTPATIENT
Start: 2022-08-06 | End: 2022-08-06

## 2022-08-06 RX ADMIN — LEVOTHYROXINE SODIUM 88 MCG: 88 TABLET ORAL at 08:39

## 2022-08-06 RX ADMIN — MICONAZOLE NITRATE: 2 POWDER TOPICAL at 21:48

## 2022-08-06 RX ADMIN — Medication 2000 UNITS: at 17:21

## 2022-08-06 RX ADMIN — CLOTRIMAZOLE: 1 CREAM TOPICAL at 21:45

## 2022-08-06 RX ADMIN — CARVEDILOL 6.25 MG: 6.25 TABLET, FILM COATED ORAL at 17:22

## 2022-08-06 RX ADMIN — MECLIZINE 12.5 MG: 12.5 TABLET ORAL at 08:39

## 2022-08-06 RX ADMIN — CEFTRIAXONE SODIUM 1000 MG: 1 INJECTION, POWDER, FOR SOLUTION INTRAMUSCULAR; INTRAVENOUS at 17:25

## 2022-08-06 RX ADMIN — HYDROCORTISONE: 25 CREAM TOPICAL at 08:40

## 2022-08-06 RX ADMIN — Medication 10 ML: at 21:54

## 2022-08-06 RX ADMIN — CARVEDILOL 6.25 MG: 6.25 TABLET, FILM COATED ORAL at 08:39

## 2022-08-06 RX ADMIN — CITALOPRAM HYDROBROMIDE 20 MG: 10 TABLET ORAL at 08:39

## 2022-08-06 RX ADMIN — Medication 100 MG: at 08:39

## 2022-08-06 RX ADMIN — MICONAZOLE NITRATE: 2 POWDER TOPICAL at 08:41

## 2022-08-06 RX ADMIN — FUROSEMIDE 20 MG: 20 TABLET ORAL at 08:39

## 2022-08-06 RX ADMIN — ENOXAPARIN SODIUM 70 MG: 100 INJECTION SUBCUTANEOUS at 21:45

## 2022-08-06 RX ADMIN — ENOXAPARIN SODIUM 70 MG: 100 INJECTION SUBCUTANEOUS at 08:39

## 2022-08-06 RX ADMIN — LACTOBACILLUS TAB 2 TABLET: TAB at 17:21

## 2022-08-06 RX ADMIN — FERROUS SULFATE TAB 325 MG (65 MG ELEMENTAL FE) 325 MG: 325 (65 FE) TAB at 08:39

## 2022-08-06 RX ADMIN — PANTOPRAZOLE SODIUM 40 MG: 40 TABLET, DELAYED RELEASE ORAL at 05:44

## 2022-08-06 RX ADMIN — WARFARIN SODIUM 7.5 MG: 5 TABLET ORAL at 17:21

## 2022-08-06 RX ADMIN — LACTOBACILLUS TAB 2 TABLET: TAB at 08:39

## 2022-08-06 RX ADMIN — Medication 10 ML: at 08:45

## 2022-08-06 RX ADMIN — LACTOBACILLUS TAB 2 TABLET: TAB at 21:47

## 2022-08-06 RX ADMIN — CLOTRIMAZOLE: 1 CREAM TOPICAL at 08:40

## 2022-08-06 ASSESSMENT — ENCOUNTER SYMPTOMS
WHEEZING: 0
COUGH: 0
CHEST TIGHTNESS: 0
RESPIRATORY NEGATIVE: 1
BLOOD IN STOOL: 0
GASTROINTESTINAL NEGATIVE: 1
NAUSEA: 0
STRIDOR: 0
SHORTNESS OF BREATH: 0
EYES NEGATIVE: 1
BACK PAIN: 1

## 2022-08-06 NOTE — PROGRESS NOTES
Physical Therapy Rehab Treatment Note  Facility/Department[facility]  [facility]       NAME: Thuy Castillo  : 1932 (80 y.o.)  MRN: [id number]  CODE STATUS: DNR-CC    Date of Service: 2022       Restrictions:  Restrictions/Precautions: Fall Risk  Position Activity Restriction  Other position/activity restrictions: Abdominal drain       SUBJECTIVE:   Subjective: Reports dizziness and stomach pain    Pain  Pain: Denies pre and post session pain. OBJECTIVE:     Bed Mobility  Additional Factors: Head of bed flat; With handrails;Verbal cues; Increased time to complete  Roll Right  Assistance Level: Supervision  Supine to Sit  Assistance Level: Supervision  Scooting  Assistance Level: Supervision    Sit to Stand  Assistance Level: Supervision  Stand to Sit  Assistance Level: Supervision  Bed To/From Chair  Assistance Level: Supervision    Ambulation  Surface: Carpet  Device: Rolling walker  Distance: 100'  Activity: Within Room  Activity Comments: Reciprocal pattern  Additional Factors: Set-up; Verbal cues  Assistance Level: Stand by assist  Gait Deviations: Slow raji;Decreased step length bilateral;Decreased heel strike right;Decreased heel strike left; Wide base of support  Skilled Clinical Factors: Patient with AFO's donned Improved gait up to 150' Patient completes all transfers with supervision    Stairs  Stair Height: 6''  Device: Bilateral handrails  Number of Stairs: 4  Additional Factors: Set-up; Verbal cues; Non-reciprocal going up;Non-reciprocal going down  Assistance Level: Contact guard assist    Activity Tolerance  Activity Tolerance: Patient tolerated treatment well    ASSESSMENT/PROGRESS TOWARDS GOALS:   Body Structures, Functions, Activity Limitations Requiring Skilled Therapeutic Intervention: Decreased functional mobility ; Decreased strength;Decreased posture;Decreased balance;Decreased endurance;Decreased safe awareness;Decreased ADL status; Decreased ROM; Decreased coordination  Assessment: Patient continues to progress towards gait and transfer goals. Improved hand placement with transfers. Goals:  Long Term Goals  Long term goal 1: Pt to complete bed mobility with indep  Long term goal 2: Pt to complete transfers with indep  Long term goal 3: Pt to ambulate 50ft with LRD and indep  Long term goal 4: Pt to manage 2 steps with B HR and Supervision  Long term goal 5: Pt to complete TUG within 20sec to demonstrate improved functional mobility/balance    PLAN OF CARE/Safety:   Safety Devices  Type of Devices: All fall risk precautions in place; Left in chair      Therapy Time:   Individual   Time In 1430   Time Out 1500   Minutes 30     Minutes: 30  Transfer/Bed mobility training: 15  Gait training:15    Myles Hewitt PTA, 08/06/22 at 4:33 PM

## 2022-08-06 NOTE — PROGRESS NOTES
Assessment completed. A&O x4. C/o dizziness this morning. Gave PRN Antivert 12.5 mg. Orthos done. Lying 117/75, 101. Sitting 117/68, 98. Pt refused to stand long enough for vitals. Dr Haydee Owens aware. Dr Tyson Ray came to evaluate need for percutaneous drain from [facility]. Dr Tyson Ray recommends to leave drain at this time. Drsg to [facility] drain is clean, dry and intact. INR today is 1.4. Pharmacy dosing Coumadin. In bed with alarm activated. Call light in reach.  Electronically signed by Ramón Begum LPN on 0/7/4251 at 28:93 PM

## 2022-08-06 NOTE — PROGRESS NOTES
OCCUPATIONAL THERAPY  INPATIENT REHAB TREATMENT NOTE  [facility]      NAME: Noris Moctezuma  : 1932 (80 y.o.)  MRN: [id number]  CODE STATUS: DNR-CC  Room: R250R250-01    Date of Service: 2022    Referring Physician: Dr. 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 \"    Pain at start of treatment: No    Pain at end of treatment: No    Location:   Nursing notified: Not Applicable  RN:   Intervention: None    COGNITION:  Orientation  Overall Orientation Status: Within Functional Limits  Orientation Level: Oriented to place;Oriented to time;Oriented to person  Cognition  Overall Cognitive Status: Exceptions          OBJECTIVE:     Pt completed laundry folding task with socks. Pt matched socks that were spread out on table . Pt used BUE to find mates and fold socks. Pt required extended time to compete task d/t needing several intermittent recovery periods d/t hand/finger pain. Pt c/o fingers becoming stiff; educated pt to rub fingers out to release the tendons and muscles. Provided task to improve functional IADL skills. Pt completed card matching task. Provided visual/verbal cues on matching the suites with the patient. Pt did not understand at first therefore, provided re-instruction on task with visual/verbal cues again. Pt initially attempted to match colors not suites, but after re-direction/instruction to task, pt was able to place each card according to suite. ASSESSMENT:  Assessment: Pt needed frequent rest periods d/t hand pain  Activity Tolerance: Patient tolerated treatment well      PLAN OF CARE:  Strengthening, Balance training, Functional mobility training, Neuromuscular re-education, Endurance training, Self-Care / ADL, Cognitive/Perceptual training, Safety education & training, Home management training, Patient/Caregiver education & training  Continue per OT POC for planned d/c on 22    Patient goals : \"to be able to wash my back and my feet. \"  Time Frame for Long term goals : 2 weeks  Long Term Goal 1: Pt within two weeks of evaluation will demonstrate progress to goals to address areas as stated below to increase functional independence for return to PLOF  Long Term Goal 2: Pt will increase independence with ADL Pt will increase independence with ADL transfers  Long Term Goal 3: Pt will increase bilateral UE strength to increase ease of ADL transfers        Therapy Time:   Individual Group Co-Treat   Time In 1500       Time Out 1606         Minutes 66                   ADL/IADL trainin minutes   Cognitive Retraining: 15 minutes    Electronically signed by:     PRUDENCE Johnston,   2022, 4:14 PM

## 2022-08-06 NOTE — PROGRESS NOTES
Progress Note  Patient: Roby Aburto  Unit/Bed: R250/R250-01  YOB: 1932  MRN: [id number]  Acct: [de-identified]   Admitting Diagnosis: Impaired mobility and activities of daily living [Z74.09, Z78.9]  Impaired mobility and ADLs [Z74.09, Z78.9]  Admit Date:  8/3/2022  Hospital Day: 3    Chief Complaint:  CAD    Histories:  Past Medical History:   Diagnosis Date    Ascending aortic aneurysm ([address] 6/23/2017    Charcot Arleen Tooth muscular atrophy     CHF (congestive heart failure) (HCC)     Chronic diastolic CHF (congestive heart failure) ([facility] 4/1/2022    Depression     Descending aortic aneurysm ([facility] [facility] 6/23/2017    Essential hypertension 2/16/2018    Headache     HTN (hypertension)     Impaired mobility and activities of daily living     Lumbar stenosis with neurogenic claudication     Myelopathy ([address]     Osteoarthritis      Past Surgical History:   Procedure Laterality Date    JOINT REPLACEMENT Bilateral     knees    OTHER SURGICAL HISTORY Right 07/21/2022    cat scan guided abdominal mass aspiration with drain placement by Dr. De La Cruz Cast Left 02/25/2021    LEFT PLEURAL CATHETER INSERTION performed by Rolf Queen MD at [place] [id number] Left 01/29/2021    total of 755 cc removed per Dr Moon Koehler specimen sent to lab     Family History   Problem Relation Age of Onset    Arthritis Mother     Arthritis Father     High Blood Pressure Father      Social History     Socioeconomic History    Marital status:       Spouse name: None    Number of children: 2    Years of education: None    Highest education level: None   Tobacco Use    Smoking status: Never    Smokeless tobacco: Never   Substance and Sexual Activity    Alcohol use: No     Alcohol/week: 0.0 standard drinks    Drug use: No   Social History Narrative    , Lives With: Alone, son lives down the street, dtr is in the area    Type of Home: [facility]  in [address]: One level    Home Access: Stairs to enter with rails- Number of Steps: 2- Rails: Both    Bathroom Shower/Tub: Tub/Shower unit, Bathroom Equipment: Grab bars in shower, Shower chair    Home Equipment: Rolling walker, Cane(Pt infrequemtly uses DME for ambulation and prefers to furniture walk in home)    ADL Assistance: [address]: Independent    Homemaking Responsibilities: Yes    Ambulation Assistance: Independent, Transfer Assistance: Independent    Additional Comments: Son stops over 2 times daily           Subjective/HPI  No new events doing well eating well no cp less dizzy w Meclizine. BP stable    EKG:        Review of Systems:   Review of Systems   Constitutional: Negative. Negative for diaphoresis and fatigue. HENT: Negative. Eyes: Negative. Respiratory: Negative. Negative for cough, chest tightness, shortness of breath, wheezing and stridor. Cardiovascular: Negative. Negative for chest pain, palpitations and leg swelling. Gastrointestinal: Negative. Negative for blood in stool and nausea. Genitourinary: Negative. Musculoskeletal:  Positive for arthralgias, back pain and gait problem. Skin: Negative. Neurological:  Positive for weakness. Negative for dizziness, syncope and light-headedness. Hematological: Negative. Psychiatric/Behavioral: Negative. Physical Examination:    /75   Pulse (!) 101   Temp 100 °F (37.8 °C) (Oral)   Resp 17   Ht 5' (1.524 m)   Wt 164 lb (74.4 kg)   SpO2 95%   BMI 32.03 kg/m²    Physical Exam   Constitutional: No distress. She appears chronically ill. HENT:   Normal cephalic and Atraumatic   Eyes: Pupils are equal, round, and reactive to light. Neck: Thyroid normal. No JVD present. No neck adenopathy. No thyromegaly present. Cardiovascular: Normal rate, regular rhythm, intact distal pulses and normal pulses. Murmur heard. Pulmonary/Chest: Effort normal and breath sounds normal. She has no wheezes. She has no rales. She exhibits no tenderness. Abdominal: Soft. Bowel sounds are normal. There is no abdominal tenderness. Musculoskeletal:         General: No tenderness or edema. Normal range of motion. Cervical back: Normal range of motion and neck supple. Neurological: She is alert and oriented to person, place, and time. Skin: Skin is warm. No cyanosis. Nails show no clubbing.      LABS:  CBC:   Lab Results   Component Value Date/Time    WBC 6.2 08/04/2022 03:28 AM    RBC 3.62 08/04/2022 03:28 AM    HGB 10.1 08/04/2022 03:28 AM    HCT 30.4 08/04/2022 03:28 AM    MCV 83.9 08/04/2022 03:28 AM    MCH 27.9 08/04/2022 03:28 AM    MCHC 33.3 08/04/2022 03:28 AM    RDW 15.8 08/04/2022 03:28 AM     08/04/2022 03:28 AM     CBC with Differential:    Lab Results   Component Value Date/Time    WBC 6.2 08/04/2022 03:28 AM    RBC 3.62 08/04/2022 03:28 AM    HGB 10.1 08/04/2022 03:28 AM    HCT 30.4 08/04/2022 03:28 AM     08/04/2022 03:28 AM    MCV 83.9 08/04/2022 03:28 AM    MCH 27.9 08/04/2022 03:28 AM    MCHC 33.3 08/04/2022 03:28 AM    RDW 15.8 08/04/2022 03:28 AM    BANDSPCT 2 07/24/2022 05:00 AM    METASPCT 2 07/24/2022 05:00 AM    LYMPHOPCT 7.0 07/24/2022 05:00 AM    MONOPCT 5.8 07/24/2022 05:00 AM    BASOPCT 1.0 07/24/2022 05:00 AM    MONOSABS 0.7 07/24/2022 05:00 AM    LYMPHSABS 0.8 07/24/2022 05:00 AM    EOSABS 0.0 07/24/2022 05:00 AM    BASOSABS 0.1 07/24/2022 05:00 AM     CMP:    Lab Results   Component Value Date/Time     08/04/2022 03:28 AM    K 3.9 08/04/2022 03:28 AM    K 3.2 07/24/2022 05:00 AM     08/04/2022 03:28 AM    CO2 27 08/04/2022 03:28 AM    BUN 14 08/04/2022 03:28 AM    CREATININE 0.56 08/04/2022 03:28 AM    GFRAA >60.0 08/04/2022 03:28 AM    LABGLOM >60.0 08/04/2022 03:28 AM    GLUCOSE 110 08/04/2022 03:28 AM    PROT 6.0 07/24/2022 05:00 AM    LABALBU 3.0 08/04/2022 03:28 AM    CALCIUM 8.8 08/04/2022 03:28 AM    BILITOT 0.3 07/24/2022 05:00 AM    ALKPHOS 51 07/24/2022 05:00 AM AST 20 07/24/2022 05:00 AM    ALT 19 07/24/2022 05:00 AM     BMP:    Lab Results   Component Value Date/Time     08/04/2022 03:28 AM    K 3.9 08/04/2022 03:28 AM    K 3.2 07/24/2022 05:00 AM     08/04/2022 03:28 AM    CO2 27 08/04/2022 03:28 AM    BUN 14 08/04/2022 03:28 AM    LABALBU 3.0 08/04/2022 03:28 AM    CREATININE 0.56 08/04/2022 03:28 AM    CALCIUM 8.8 08/04/2022 03:28 AM    GFRAA >60.0 08/04/2022 03:28 AM    LABGLOM >60.0 08/04/2022 03:28 AM    GLUCOSE 110 08/04/2022 03:28 AM     Magnesium:    Lab Results   Component Value Date/Time    MG 2.1 08/03/2022 05:18 AM     Troponin:    Lab Results   Component Value Date/Time    TROPONINI <0.010 07/18/2022 12:30 PM        Active Hospital Problems    Diagnosis Date Noted    A-fib [facility]) [I48.91]      Priority: High    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 [address] [I50.32] 04/01/2022     Priority: Low    Acute on chronic combined systolic and diastolic CHF (congestive heart failure) ([address] [I50.43] 01/25/2021     Priority: Low    [facility] [I71.9] 06/23/2017     Priority: Low        Assessment/Plan:  Impression/Plan:   Abd Abscess/Mass - s/p Drainage  LVEF 60  Symptoms of Orthopnea- lungs are clear no JVD nor peripheral edema. - will give Empiric low dose Lasix. Will need periodic Lab at Rehab  Frequent PAF - rate is controlled on exam.  CAD- moderate - no angina. Continue BB  HTN Stable but elevated this am. Will observe. HPL - statin on hold  Ascending AO aneurysm-  had increased in size and measures 5.3cm distal arch. 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 Xarelto stopped and started Heparin. Had long discussion with pt and daughter.   They no longer want aggressive Rx and does not want to be transferred to [facility]. Conitnue Lovenox until INR therapeutic. Daily INR. Advance Warfarin dose to 7.5. INR 1.4 today.           Electronically signed by Chance Del Real MD on 8/6/2022 at 1:20 PM

## 2022-08-06 NOTE — PROGRESS NOTES
Patient seen and examined    I have consulted on this woman in the past.  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 Concerns are primary colon cancer versus appendiceal carcinoma or retroperitoneal sarcoma. A drain was placed in this tumor for evaluation and cytology 2 weeks ago. Since that time patient has been diagnosed with aortic aneurysm and extensive thrombus through the aorta. She is currently anticoagulated. She has had a repeat CAT scan which I reviewed. Patient's daughter from [place] was present at the bedside. The initial thought was to transfer to [facility] but the family does not wish to proceed with any large operative intervention given the current issues of aortic thrombus as well as age and comorbidities. I was asked to see her regarding drain removal.    The daughter reports that she has no pain from the drain. She is currently on a diet. She is having some issues with vertigo. I do not believe operative intervention is an option for her given the significant intraoperative and perioperative morbidity and mortality. I would not remove her percutaneously placed drain at this time. I discussed options for transfer to tertiary care center which the family does not wish to do at this time. All questions were answered. I believe quality of life and palliation is best in this patient's case. I would be happy to answer any additional questions if they arise.

## 2022-08-06 NOTE — PROGRESS NOTES
Patient up to bathroom with walker and assist gait unsteady at times safety reinforced. Bladder scanned for 194cc.  Returned to bed 02 at 3 liters per n/c maintained pulse ox 98%

## 2022-08-06 NOTE — PROGRESS NOTES
Subjective: The patient complains of severe acute on chronic progressive fatigue and  RLQ abdominal pain partially relieved by rest, medications, PT,  OT,     and rest and exacerbated by recent illness -she was initially admitted through the ER at [facility] complaining of abdominal pain for the past 5 to 7 days. She also complained of headache and dizziness. Imaging revealed an abdominal mass felt to be either cancer or an abscess. Imaging was thinking it was more like an abscess. She was admitted to [facility] placed on antibiotics and a drain was placed under interventional radiology's guidance. She was transferred off the rehab unit because of findings on a CT abdomen which showed aneurysm and clots however she was felt to be nonoperative given her age and complex medical status. Her family does not want to pursue aggressive treatment and she is very glad to be back on the rehabilitation unit and is medically stable and ready to participate. She still has her abdominal drain and I discussed with them that interventional radiology who had been under the impression the patient was going to the [facility] for further operative care. Now that she is not we will touch base with general surgery to see if they would be comfortable with this getting it out. .      I am concerned about patients medical complexities and barriers to advancing in rehab goals including  drain for Abd abscess. .        I reviewed current care and plans for further care with other rehab providers including nursing and case management. According to recent nursing note, \"Patient up to bathroom with walker and assist gait unsteady at times safety reinforced. Bladder scanned for 194cc. Returned to bed 02 at 3 liters per n/c maintained pulse ox 98%\". Been having a lot of overflow incontinence urinary retention recent UA was negative. She may require cath if no void and or Zeng catheter. ROS x10:   The patient 1312)  Scooting  Assistance Level: Stand by assist (08/04/22 1312)  Transfers:  Transfers  Sit to Stand: Stand by assistance (08/05/22 1444)  Stand to sit: Stand by assistance (08/05/22 1444)  Bed to Chair: Stand by assistance (08/05/22 1238)  Comment: VCs and demo for handplacement to improve safety and technique with fait follow through. (08/05/22 1444)  Sit to Stand  Assistance Level: Supervision (08/04/22 1312)  Stand to Sit  Assistance Level: Supervision (08/04/22 1312)  Stand Pivot  Assistance Level: Contact guard assist (08/04/22 1312)  Gait:   Ambulation  WB Status: wbat (08/05/22 1443)  Ambulation  Surface: carpet (08/05/22 1443)  Device: Rolling Walker (08/05/22 1443)  Other Apparatus: O2 (08/05/22 1443)  Assistance: Contact guard assistance;Minimal assistance (08/05/22 1124)  Quality of Gait: Assist for management of O2 line. (08/05/22 1443)  Gait Deviations: Slow Raji;Decreased step length (08/05/22 1443)  Distance: 50ftx2 (08/05/22 1443)  Ambulation  Surface: Carpet (08/04/22 1525)  Device: Rolling walker (08/04/22 1525)  Distance: 15' x 2, 25' with a turn each gait. (08/04/22 1525)  Activity: Within Room (08/04/22 1525)  Additional Factors: Set-up; Verbal cues (08/04/22 1525)  Assistance Level: Contact guard assist (08/04/22 1525)  Gait Deviations: Slow raji;Decreased step length bilateral;Decreased heel strike right;Decreased heel strike left; Wide base of support (08/04/22 1525)  Skilled Clinical Factors: Pt with bilateral foot drop and not wearing AFO's this PM.  Pt goes barefoot at home. Pt without LOB with gait. (08/04/22 1525)  Stairs:  Stairs/Curb  Stairs?: Yes (08/05/22 1242)  Stairs  # Steps : 4 (08/05/22 1242)  Stairs Height: 6\" (08/05/22 1242)  Rails: Bilateral (08/05/22 1242)  Assistance: Contact guard assistance (08/05/22 1242)  Stairs  Stair Height: 6'' (08/04/22 1525)  Device: Bilateral handrails (08/04/22 1525)  Number of Stairs: 4 (08/04/22 1525)  Additional Factors: Set-up; Verbal cues;Non-reciprocal going up;Non-reciprocal going down (08/04/22 1525)  Assistance Level: Contact guard assist;Minimal assistance; Moderate assistance (08/04/22 1525)  Skilled Clinical Factors: Pt reuiring CGA with first two steps, but then Min A with third and Mod with fourth. Pt only CGA with descending forward. (08/04/22 1525)  W/C mobility:       Occupational Therapy:   Hand Dominance: Right  ADL  Feeding: Setup (08/04/22 1114)  Grooming: Setup; Increased time to complete (08/04/22 1114)  UE Bathing: Stand by assistance; Increased time to complete (08/04/22 1114)  LE Bathing: Moderate assistance; Increased time to complete (08/04/22 1114)  UE Dressing: Contact guard assistance; Increased time to complete (08/04/22 1114)  LE Dressing: Maximum assistance; Increased time to complete (08/04/22 1114)  Toileting: Maximum assistance (08/04/22 1114)  Toilet Transfers  Toilet - Technique: Stand step (08/04/22 1115)  Equipment Used: Grab bars (08/04/22 1115)  Toilet Transfer: Minimal assistance (08/04/22 1115)  Tub Transfers  Tub Transfers: Not tested (08/04/22 1115)  Shower Transfers  Shower - Transfer From: Wheelchair (08/04/22 1115)  Shower - Transfer Type: To and From (08/04/22 1115)  Shower - Transfer To: Shower seat with back (08/04/22 1115)  Shower - Technique: Stand pivot (08/04/22 1115)  Shower Transfers: Minimal assistance (08/04/22 1115)    Speech Therapy:            Diet/Swallow:                      Lab/X-ray studies reviewed, analyzed and discussed with patient and staff:   Recent Results (from the past 24 hour(s))   Protime-INR    Collection Time: 08/06/22  4:42 AM   Result Value Ref Range    Protime 16.6 (H) 12.3 - 14.9 sec    INR 1.4        CT ABDOMEN PELVIS  7/29/2022  1. The previously placed abscess drain in the right lower quadrant appears to be in the periphery of the abscess with loculations now more apparent in the fluid collection.  The fluid collection appears to be grossly the same size as prior to drainage. 2.New mural thrombus is noted in the distal aortic arch extending to the lower thoracic aorta which was not seen on prior study. 3.Note is again made of an aortic aneurysm involving the ascending aorta, aortic arch, and descending aorta. Based on current images, the ascending aorta appears to be 2 mm larger when compared to study dating February 17, 2021. The ascending aorta now measures 5.5 cm. Previously the arch measured 5.3 cm. 4.Note is again made of left lower small pleural effusion with bilateral basilar opacities which may represent atelectasis. 5.Note is again made of a large cystic mass in the pelvis. DIAGNOSIS: Previous cystic lesion adjacent to the ascending colon status post drain placement      Bibasilar atelectasis with left small pleural effusion. Underlying nodules cannot be excluded. The heart is enlarged. The liver is of normal size and attenuation without focal lesions. The spleen is of normal size and attenuation without focal lesions. Pancreas shows no signs of focal mass or peripancreatic fluid collection. Kidneys are normal in size. There is no hydronephrosis. No renal mass is identified. Adrenal glands are unremarkable. Note is again made of a thoracoabdominal aortic aneurysm. The ascending aorta now measures 5.5 cm, this is larger when compared to prior study dating 5.3 cm. 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 This large thrombus was not seen on prior study. No significant retroperitoneal adenopathy or ascites is identified. Again seen is a cystic lesion involving the proximal ascending aorta which now appears to be more septated than prior. The previously placed drain is now in the periphery of the lesion. CT PELVIS:  A large cystic mass is again seen in the lower pelvis.      CT ABDOMEN PELVIS  : 7/18/2022    Residual intravenous contrast medium from consideration, minimal presence of adjacent inflammatory change. 8.0 x 5.8 x 7.5 cm left adnexal cysts as discussed, most likely ovarian in etiology. If clinical concern warrants, pelvic sonography may be obtained for further evaluation. Other findings discussed. CT Head   7/18/2022     No acute changes are noted. There is no evidence for mass, mass effect or midline shift. No abnormal extra-axial fluid collections are appreciated. Age-appropriate cortical volume loss is seen. There are patchy areas of hypoattenuation in a sub-cortical, central white and periventricular distribution consistent with nonspecific ischemic small vessel disease. Calcifications are again seen in the basal ganglia. There are no acute parenchymal alterations, blood byproducts or abnormal extracerebral fluid. The calvarium is grossly intact. The visualized paranasal sinuses show mucoceles or polyps in the maxillary sinuses. Mild fluid is seen in the mastoid air cells that is not significantly changed and is consistent with chronic changes. Bob Power 1.Age-appropriate cortical atrophy and periventricular microangiopathy. When compared to previous study there has been no significant interval change. 2. No acute hemorrhage or extra-axial fluid collection            CT ABDOMEN PELVIS   7/18/2022     1. In the right lower quadrant of the abdomen, pericolic abscesses versus pericolic mass is seen. This is thought more likely abscess. 2. A cystic mass is again seen in the pelvis and is likely in the ovary. It is unchanged from previous examination The patient will be given oral contrast and rescanned to evaluate the right lower quadrant. Previous extensive, complex labs, notes and diagnostics reviewed and analyzed.      ALLERGIES:    Allergies as of 08/03/2022 - Fully Reviewed 08/03/2022   Allergen Reaction Noted    Latex  07/19/2017    Tape [adhesive tape]  06/28/2016      (please also verify by checking MAR)      Recently, I evaluated this patient for periodic reassessment of medical and functional status. The patient was discussed in detail at the treatment team meeting focusing on current medical issues, progress in therapies, social issues, psychological issues, barriers to progress and strategies to address these barriers, and discharge planning. See the hand written addendum to rehab progress note. The patient continues to be high risk for future disability and their medical and rehabilitation prognosis continue to be good and therefore, we will continue the patient's rehabilitation course as planned. The patient's tentative discharge date was set. Patient and family education was discussed. The patient was made aware of the team discussion regarding their progress. Discharge plans were discussed along with barriers to progress and strategies to address these barriers, patient encouraged to continue to discuss discharge plans with . 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 Functional and medical status reassessed regarding patients ability to participate in therapies and patient found to be able to participate in acute intensive comprehensive inpatient rehabilitation program including PT/OT to improve balance, ambulation, ADLs, and to improve the P/AROM. Therapeutic modifications regarding activities in therapies, place, amount of time per day and intensity of therapy made daily. In bed therapies or bedside therapies prn. Bowel and Bladder dysfunction nocturia with severe urinary retention, Neurogenic bowel and bladder:  frequent toileting, ambulate to bathroom with assistance, check post void residuals. Check for C.difficile x1 if >2 loose stools in 24 hours, continue bowel & bladder program.  Monitor bowel and bladder function. Lactinex 2 PO every AC.   MOM prn, Jose Fraction Bomb prn, Glycerin suppository prn, enema prn. Encourage therapy and nursing to co-treat and problem solve re continence. Recent UA was negative. Severe right Abd  pain as well as generalized OA pain: reassess pain every shift and prior to and after each therapy session, give prn Tylenol and consider scheduled Tylenol, modalities prn in therapy, masage, Lidoderm, K-pad prn. Consider scheduled AM pain meds. Skin healing right abd and breakdown risk:  continue pressure relief program.  Daily skin exams and reports from nursing. Fatigue due to nutritional and hydration deficiency: Add and titrate vitamin B12 vitamin D and CoQ10 continue to monitor I&Os, calorie counts prn, dietary consult prn. Add healthy snack at night. Acute episodic insomnia with situational adjustment disorder:  prn Ambien, monitor for day time sedation. Falls risk elevated:  patient to use call light to get nursing assistance to get up, bed and chair alarm. Elevated DVT risk: progressive activities in PT, continue prophylaxis PORTILLO hose, elevation and  Lovenox to Coumadin . Complex discharge planning: Discharge August 17, 2022 to home alone with help from her son with home health care and video monitor. We are suggesting that patient should not be home alone for more than a few minutes. Apparently patient and family do not have that option. We are hoping that patient can go stay with one of her children though she is quite against the idea. He is competent to make her own decisions. Weekly team meeting every Monday to re-assess progress towards goals, discuss and address social, psychological and medical comorbidities and to address difficulties they may be having progressing in therapy. Patient and family education is in progress. The patient is to follow-up with their family physician after discharge.         Complex Active General Medical Issues that complicate care Assess & Plan:     A-fib,   Essential hypertension, Acute on chronic combined systolic (congestive) and diastolic (congestive) heart failure-Acute rehab to monitor heart rate and rhythm with the option of telemetry and the effects of chronotropic medication with respect to increasing physical activity and exercise in PT, OT, ADLs with medication titration to lowest effective dosing. Continue blood signs every shift focusing on heart rate, rhythm and blood pressure checks with orthostatic checks-monitoring the effect of exercise, therapy and posture. Consult hospitalist for backup medical and adjust/add medications (Lovenox transitioning to Coumadin patient had been on Xarelto, Coreg). Monitor heart rate and blood pressure as well as medications effects on vital signs before during and after therapy with especial focus on preventing orthostasis and falls risk. Hyponatremia-recheck BMP avoid excessive water  Anemia-Monitor vital signs monitor for orthostasis and tachycardia, check H&H prn. Vitamin B12 and iron-dose iron with food to prevent GI upset. Monitor for constipation. Monitor stools for blood. Hypothyroid-  Synthroid and titrate dosing. Follow vital signs, recheck TSH and thyroid studies as outpatient. 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 Osteoarthritis of lumbar spine with myelopathy-her extremity strengthening    Depression-emotional support provided daily, vitamin B12, encourage participation in rehabilitation support group and recreational therapy, adjust/add medications (B12, Celexa)  Abdominal abscess versus cancer-drain still in place pending cytology-patient is on IV Zosyn pending cultures-Pt has right abd abscess/mass. S/p drainage. SHe also has enlarging Thoraci Aortic   Aneurysm with large mural thrombus burden. Per GI surgery Dr. Patterson Reusing he would like to keep the drain is long as possible.   Do not remove the drain.  GERD-Elevate head of bed after meals, monitor stools for blood, lowest effective dose of PPI, consider Tums. Yeast rash and immunosuppression-Micatin powder and Floranex   AA now at 5.5cm up from 5.3cm but now there is a new large mural thrombus involving the descending portion of the aortic arch and extending how to the lower thoracic aorta along with large cystic mass. The thrombus was not seen on previous study. Patient had been on xarelto and now on Lovenox transitioning to Coumadin. . Given her abdominal pathology and large thrombus however patient due to her advanced age and high risk for any surgical intervention she is electing for conservative care. Focus on balancing physical with emotional rehabilitation. Involve rehabilitation psychology and spiritual care if patient is agreeable.       Electronically signed by Cynthia Ireland DO on 8/5/22 at 4:51 AM KIRA Delaney D.O., PM&R     Attending    [address]

## 2022-08-06 NOTE — PROGRESS NOTES
[facility] Occupational Therapy      Date: 2022  Patient Name: Thuy Castillo        MRN: [id number]  Account: [de-identified]   : 1932  (80 y.o.)  Room: [place] [address]    Chart reviewed, attempted OT at 1000 for 30 minutes. Patient not seen 2° to:    Pt arrived to therapy gym via [facility] with OT Tech. Per OT Tech, pt reporting dizziness and nursing is aware. BP taken with a reading of 143/96. Attempted to reach [facility] via phone, however, N unavailable at this time. Message left with [place]. Pt's daughter arrived to therapy gym and began engaging in intense conversation with pt in [facility]. Pt's daughter very concerned about pt's dizziness, fatigue, and BP. Called Charge RN [address] [place] [address]. Per [address] [place] [address] Nw, pt okay to continue therapy if able. Pt's daughter adamant pt be seen by nurse or doctor at this time. Returned pt to room and notified nursing. Will attempt again when able.     Electronically signed by PRUDENCE Sesay on 2022 at 11:37 AM

## 2022-08-06 NOTE — PROGRESS NOTES
Warfarin Dosing - Pharmacy Consult Note  Consulting Provider: Dr Rufino Gonzales    Indication:   thrombus  Warfarin Dose prior to admission: n/a   Concurrent anticoagulants/antiplatelets: n/a  Significant Drug Interactions: Celexa, Lovenox bridging  Recent Labs     08/04/22  0328 08/04/22  1645 08/05/22  0543 08/06/22  0442   INR  --  1.1 1.1 1.4   HGB 10.1*  --   --   --      --   --   --    LABALBU 3.0*  --   --   --      Recent warfarin administrations                     warfarin (COUMADIN) tablet 7.5 mg (mg) 7.5 mg Given 08/05/22 1627    warfarin (COUMADIN) tablet 7.5 mg (mg) 7.5 mg Given 08/04/22 1834    warfarin (COUMADIN) tablet 5 mg (mg) 5 mg Given 08/03/22 1622                Date      INR       Dose  08/02/22   1.2       5 mg  08/03/22   1.1       5 mg  08/04/22   1.1    7.5 mg  08/05/22   1.1    7.5 mg   08/05/22   1.4    7.5 mg  Assessment/Plan  (Goal INR: 2 - 3)  INR remains below goal, at 1.4 again today (goal range 2-3). Will repeat dose of warfarin 7.5 mg today only. Continue Lovenox bridging at this time. Active problem list reviewed. INR orders are placed. Chart reviewed for pertinent labs, drug/diet interactions, and past doses. Documentation of patient's clinical condition was reviewed. Pharmacy Dosing:  Pharmacy will continue to follow.       Riana Alegria [facility]  8/6/2022  2:24 PM

## 2022-08-06 NOTE — PROGRESS NOTES
Physical Therapy Rehab Treatment Note  Facility/Department: [facility]  [facility]       NAME: Monica Adame  : 1932 (80 y.o.)  MRN: [id number]  CODE STATUS: [id number]    Date of Service: 2022     Restrictions:  Restrictions/Precautions: Fall Risk  Position Activity Restriction  Other position/activity restrictions: Abdominal drain    SUBJECTIVE:   Subjective: Reports dizziness and stomach pain    Pain  Pain: Denies pre and post session pain. OBJECTIVE:     PT Exercises  Exercise Treatment: Seaated exercises: Long arc quad, Hip flexion, PROM Ankle pumps, Side kicks, Pillow squeezes x 20 each     Patient declines out of chair activities. Attempted transfer to bed and gait and patient declines    ASSESSMENT/PROGRESS TOWARDS GOALS:   Assessment  Assessment: Patient declines all out of chair activities this a.m. Patient agreeable to complete seated ther ex. Activity Tolerance: Patient tolerated treatment well  Discharge Recommendations: Home with assist PRN;Home with Home health PT    Goals:  Long Term Goals  Long term goal 1: Pt to complete bed mobility with indep  Long term goal 2: Pt to complete transfers with indep  Long term goal 3: Pt to ambulate 50ft with LRD and indep  Long term goal 4: Pt to manage 2 steps with B HR and Supervision  Long term goal 5: Pt to complete TUG within 20sec to demonstrate improved functional mobility/balance    PLAN OF CARE/Safety:   Safety Devices  Type of Devices: All fall risk precautions in place; Left in chair      Therapy Time:   Individual   Time In 1100   Time Out 1115   Minutes 15     Missed 45 minutes secondary to dizziness/ stomach pain    Minutes: 15  Therapeutic ex: Dedra Larson PTA, 22 at 11:41 AM

## 2022-08-07 LAB
INR BLD: 1.6
PROTHROMBIN TIME: 18.5 SEC (ref 12.3–14.9)

## 2022-08-07 PROCEDURE — 36415 COLL VENOUS BLD VENIPUNCTURE: CPT

## 2022-08-07 PROCEDURE — 6370000000 HC RX 637 (ALT 250 FOR IP): Performed by: INTERNAL MEDICINE

## 2022-08-07 PROCEDURE — 6370000000 HC RX 637 (ALT 250 FOR IP): Performed by: PHYSICAL MEDICINE & REHABILITATION

## 2022-08-07 PROCEDURE — 1180000000 HC REHAB R&B

## 2022-08-07 PROCEDURE — 2580000003 HC RX 258: Performed by: INTERNAL MEDICINE

## 2022-08-07 PROCEDURE — 99232 SBSQ HOSP IP/OBS MODERATE 35: CPT | Performed by: INTERNAL MEDICINE

## 2022-08-07 PROCEDURE — 6360000002 HC RX W HCPCS: Performed by: INTERNAL MEDICINE

## 2022-08-07 PROCEDURE — 85610 PROTHROMBIN TIME: CPT

## 2022-08-07 RX ORDER — WARFARIN SODIUM 5 MG/1
7.5 TABLET ORAL
Status: COMPLETED | OUTPATIENT
Start: 2022-08-07 | End: 2022-08-07

## 2022-08-07 RX ADMIN — MECLIZINE 12.5 MG: 12.5 TABLET ORAL at 05:34

## 2022-08-07 RX ADMIN — LEVOTHYROXINE SODIUM 88 MCG: 88 TABLET ORAL at 09:13

## 2022-08-07 RX ADMIN — Medication 2000 UNITS: at 17:35

## 2022-08-07 RX ADMIN — LACTOBACILLUS TAB 2 TABLET: TAB at 09:12

## 2022-08-07 RX ADMIN — CLOTRIMAZOLE: 1 CREAM TOPICAL at 09:17

## 2022-08-07 RX ADMIN — LACTOBACILLUS TAB 2 TABLET: TAB at 17:35

## 2022-08-07 RX ADMIN — CITALOPRAM HYDROBROMIDE 20 MG: 10 TABLET ORAL at 09:12

## 2022-08-07 RX ADMIN — WARFARIN SODIUM 7.5 MG: 5 TABLET ORAL at 17:36

## 2022-08-07 RX ADMIN — CEFTRIAXONE SODIUM 1000 MG: 1 INJECTION, POWDER, FOR SOLUTION INTRAMUSCULAR; INTRAVENOUS at 17:39

## 2022-08-07 RX ADMIN — MICONAZOLE NITRATE: 2 POWDER TOPICAL at 22:40

## 2022-08-07 RX ADMIN — ENOXAPARIN SODIUM 70 MG: 100 INJECTION SUBCUTANEOUS at 09:13

## 2022-08-07 RX ADMIN — MICONAZOLE NITRATE: 2 POWDER TOPICAL at 09:17

## 2022-08-07 RX ADMIN — CARVEDILOL 6.25 MG: 6.25 TABLET, FILM COATED ORAL at 09:12

## 2022-08-07 RX ADMIN — CARVEDILOL 6.25 MG: 6.25 TABLET, FILM COATED ORAL at 17:36

## 2022-08-07 RX ADMIN — Medication 100 MG: at 09:12

## 2022-08-07 RX ADMIN — LACTOBACILLUS TAB 2 TABLET: TAB at 20:31

## 2022-08-07 RX ADMIN — ENOXAPARIN SODIUM 70 MG: 100 INJECTION SUBCUTANEOUS at 20:31

## 2022-08-07 RX ADMIN — FUROSEMIDE 20 MG: 20 TABLET ORAL at 09:12

## 2022-08-07 RX ADMIN — Medication 10 ML: at 09:18

## 2022-08-07 RX ADMIN — PANTOPRAZOLE SODIUM 40 MG: 40 TABLET, DELAYED RELEASE ORAL at 05:34

## 2022-08-07 RX ADMIN — Medication 10 ML: at 21:30

## 2022-08-07 ASSESSMENT — ENCOUNTER SYMPTOMS
EYES NEGATIVE: 1
NAUSEA: 0
BLOOD IN STOOL: 0
WHEEZING: 0
SHORTNESS OF BREATH: 0
STRIDOR: 0
GASTROINTESTINAL NEGATIVE: 1
RESPIRATORY NEGATIVE: 1
CHEST TIGHTNESS: 0
COUGH: 0
BACK PAIN: 1

## 2022-08-07 NOTE — PROGRESS NOTES
Patients up to bathroom with walker ans assist safety reinforced. Patient returned to bed repositions self in bed 02 at 3 liters per N/C maintained.

## 2022-08-07 NOTE — PROGRESS NOTES
Progress Note  Patient: Jake Wilson  Unit/Bed: R250/R250-01  YOB: 1932  MRN: [id number]  Acct: [de-identified]   Admitting Diagnosis: Impaired mobility and activities of daily living [Z74.09, Z78.9]  Impaired mobility and ADLs [Z74.09, Z78.9]  Admit Date:  8/3/2022  Hospital Day: 4    Chief Complaint:  CAD    Histories:  Past Medical History:   Diagnosis Date    Ascending aortic aneurysm ([address] 6/23/2017    Charcot Arleen Tooth muscular atrophy     CHF (congestive heart failure) ([facility])     Chronic diastolic CHF (congestive heart failure) ([address] 4/1/2022    Depression     Descending aortic aneurysm ([facility] 6/23/2017    Essential hypertension 2/16/2018    Headache     HTN (hypertension)     Impaired mobility and activities of daily living     Lumbar stenosis with neurogenic claudication     Myelopathy ([address]     Osteoarthritis      Past Surgical History:   Procedure Laterality Date    JOINT REPLACEMENT Bilateral     knees    OTHER SURGICAL HISTORY Right 07/21/2022    cat scan guided abdominal mass aspiration with drain placement by Dr. Wagner Paula Left 02/25/2021    LEFT PLEURAL CATHETER INSERTION performed by Jean Baez MD at [place] [id number] Left 01/29/2021    total of 755 cc removed per Dr Elenita Kumar specimen sent to lab     Family History   Problem Relation Age of Onset    Arthritis Mother     Arthritis Father     High Blood Pressure Father      Social History     Socioeconomic History    Marital status:       Spouse name: None    Number of children: 2    Years of education: None    Highest education level: None   Tobacco Use    Smoking status: Never    Smokeless tobacco: Never   Substance and Sexual Activity    Alcohol use: No     Alcohol/week: 0.0 standard drinks    Drug use: No   Social History Narrative    , Lives With: Alone, son lives down the street, dtr is in the area    Type of Home: [facility]  in [address]: One level    Home Access: Stairs to enter with rails- Number of Steps: 2- Rails: Both    Bathroom Shower/Tub: Tub/Shower unit, Bathroom Equipment: Grab bars in shower, Shower chair    Home Equipment: Rolling walker, Cane(Pt infrequemtly uses DME for ambulation and prefers to furniture walk in home)    ADL Assistance: [address]: Independent    Homemaking Responsibilities: Yes    Ambulation Assistance: Independent, Transfer Assistance: Independent    Additional Comments: Son stops over 2 times daily           Subjective/HPI  No new events doing well eating well no cp less dizzy w Meclizine. BP stable    EKG:        Review of Systems:   Review of Systems   Constitutional: Negative. Negative for diaphoresis and fatigue. HENT: Negative. Eyes: Negative. Respiratory: Negative. Negative for cough, chest tightness, shortness of breath, wheezing and stridor. Cardiovascular: Negative. Negative for chest pain, palpitations and leg swelling. Gastrointestinal: Negative. Negative for blood in stool and nausea. Genitourinary: Negative. Musculoskeletal:  Positive for arthralgias, back pain and gait problem. Skin: Negative. Neurological:  Positive for weakness. Negative for dizziness, syncope and light-headedness. Hematological: Negative. Psychiatric/Behavioral: Negative. Physical Examination:    /80   Pulse 75   Temp 98.4 °F (36.9 °C) (Oral)   Resp 20   Ht 5' (1.524 m)   Wt 164 lb (74.4 kg)   SpO2 97%   BMI 32.03 kg/m²    Physical Exam   Constitutional: No distress. She appears chronically ill. HENT:   Normal cephalic and Atraumatic   Eyes: Pupils are equal, round, and reactive to light. Neck: Thyroid normal. No JVD present. No neck adenopathy. No thyromegaly present. Cardiovascular: Normal rate, regular rhythm, intact distal pulses and normal pulses. Murmur heard. Pulmonary/Chest: Effort normal and breath sounds normal. She has no wheezes. She has no rales.  She exhibits no tenderness. Abdominal: Soft. Bowel sounds are normal. There is no abdominal tenderness. Musculoskeletal:         General: No tenderness or edema. Normal range of motion. Cervical back: Normal range of motion and neck supple. Neurological: She is alert and oriented to person, place, and time. Skin: Skin is warm. No cyanosis. Nails show no clubbing.      LABS:  CBC:   Lab Results   Component Value Date/Time    WBC 6.2 08/04/2022 03:28 AM    RBC 3.62 08/04/2022 03:28 AM    HGB 10.1 08/04/2022 03:28 AM    HCT 30.4 08/04/2022 03:28 AM    MCV 83.9 08/04/2022 03:28 AM    MCH 27.9 08/04/2022 03:28 AM    MCHC 33.3 08/04/2022 03:28 AM    RDW 15.8 08/04/2022 03:28 AM     08/04/2022 03:28 AM     CBC with Differential:    Lab Results   Component Value Date/Time    WBC 6.2 08/04/2022 03:28 AM    RBC 3.62 08/04/2022 03:28 AM    HGB 10.1 08/04/2022 03:28 AM    HCT 30.4 08/04/2022 03:28 AM     08/04/2022 03:28 AM    MCV 83.9 08/04/2022 03:28 AM    MCH 27.9 08/04/2022 03:28 AM    MCHC 33.3 08/04/2022 03:28 AM    RDW 15.8 08/04/2022 03:28 AM    BANDSPCT 2 07/24/2022 05:00 AM    METASPCT 2 07/24/2022 05:00 AM    LYMPHOPCT 7.0 07/24/2022 05:00 AM    MONOPCT 5.8 07/24/2022 05:00 AM    BASOPCT 1.0 07/24/2022 05:00 AM    MONOSABS 0.7 07/24/2022 05:00 AM    LYMPHSABS 0.8 07/24/2022 05:00 AM    EOSABS 0.0 07/24/2022 05:00 AM    BASOSABS 0.1 07/24/2022 05:00 AM     CMP:    Lab Results   Component Value Date/Time     08/04/2022 03:28 AM    K 3.9 08/04/2022 03:28 AM    K 3.2 07/24/2022 05:00 AM     08/04/2022 03:28 AM    CO2 27 08/04/2022 03:28 AM    BUN 14 08/04/2022 03:28 AM    CREATININE 0.56 08/04/2022 03:28 AM    GFRAA >60.0 08/04/2022 03:28 AM    LABGLOM >60.0 08/04/2022 03:28 AM    GLUCOSE 110 08/04/2022 03:28 AM    PROT 6.0 07/24/2022 05:00 AM    LABALBU 3.0 08/04/2022 03:28 AM    CALCIUM 8.8 08/04/2022 03:28 AM    BILITOT 0.3 07/24/2022 05:00 AM    ALKPHOS 51 07/24/2022 05:00 AM    AST 20 07/24/2022 05:00 AM    ALT 19 07/24/2022 05:00 AM     BMP:    Lab Results   Component Value Date/Time     08/04/2022 03:28 AM    K 3.9 08/04/2022 03:28 AM    K 3.2 07/24/2022 05:00 AM     08/04/2022 03:28 AM    CO2 27 08/04/2022 03:28 AM    BUN 14 08/04/2022 03:28 AM    LABALBU 3.0 08/04/2022 03:28 AM    CREATININE 0.56 08/04/2022 03:28 AM    CALCIUM 8.8 08/04/2022 03:28 AM    GFRAA >60.0 08/04/2022 03:28 AM    LABGLOM >60.0 08/04/2022 03:28 AM    GLUCOSE 110 08/04/2022 03:28 AM     Magnesium:    Lab Results   Component Value Date/Time    MG 2.1 08/03/2022 05:18 AM     Troponin:    Lab Results   Component Value Date/Time    TROPONINI <0.010 07/18/2022 12:30 PM        Active Hospital Problems    Diagnosis Date Noted    A-fib [facility]) [I48.91]      Priority: High    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 [I50.32] 04/01/2022     Priority: Low    Acute on chronic combined systolic and diastolic CHF (congestive heart failure) ([address] [I50.43] 01/25/2021     Priority: Low    Descending aortic aneurysm ([facility] [address] [address] [I71.9] 06/23/2017     Priority: Low        Assessment/Plan:  Impression/Plan:   Abd Abscess/Mass - s/p Drainage  LVEF 60  Symptoms of Orthopnea- lungs are clear no JVD nor peripheral edema. - will give Empiric low dose Lasix. Will need periodic Lab at Rehab  Frequent PAF - rate is controlled on exam.  CAD- moderate - no angina. Continue BB  HTN Stable but elevated this am. Will observe. HPL - statin on hold  Ascending AO aneurysm-  had increased in size and measures 5.3cm distal arch. 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 Xarelto stopped and started Heparin. Had long discussion with pt and daughter.   They no

## 2022-08-07 NOTE — PROGRESS NOTES
Subjective: The patient complains of severe acute on chronic progressive fatigue and  RLQ abdominal pain partially relieved by rest, medications, PT,  OT,     and rest and exacerbated by recent illness -she was initially admitted through the ER at [facility] complaining of abdominal pain for the past 5 to 7 days. She also complained of headache and dizziness. Imaging revealed an abdominal mass felt to be either cancer or an abscess. Imaging was thinking it was more like an abscess. She was admitted to [facility] placed on antibiotics and a drain was placed under interventional radiology's guidance. She was transferred off the rehab unit because of findings on a CT abdomen which showed aneurysm and clots however she was felt to be nonoperative given her age and complex medical status. Her family does not want to pursue aggressive treatment and she is very glad to be back on the rehabilitation unit and is medically stable and ready to participate. ROS x10: The patient also complains of severely impaired mobility and activities of daily living. Otherwise no new problems with vision, hearing, nose, mouth, throat, dermal, cardiovascular, GI, , pulmonary, musculoskeletal, psychiatric or neurological. See also Acute Rehab PM&R H&P. Vital signs:  /80   Pulse 75   Temp 98.4 °F (36.9 °C) (Oral)   Resp 20   Ht 5' (1.524 m)   Wt 164 lb (74.4 kg)   SpO2 97%   BMI 32.03 kg/m²   I/O:   PO/Intake:  fair PO intake,   Reg diet    Bowel:   continent   Bladder: continent      retention  General:  Patient is well developed,   adequately nourished, and    well kempt. HEENT:    Pupils equal, hearing intact to loud voice, external inspection of ear and nose benign. Inspection of lips, tongue and gums benign    Musculoskeletal: No significant change in strength or tone. All joints stable.       Inspection and palpation of digits and nails show no clubbing, cyanosis or inflammatory conditions. Neuro/Psychiatric: Affect: flat but pleasant. Alert and oriented to person, place and situation with  no needded cues. No significant change in deep tendon reflexes or sensation  Lungs:  Diminished, CTA-B. Respiration effort is   normal at rest.     Heart:   S1 = S2,    irreg. Abdomen:  Soft, RLQ-tender, no enlargement of liver or spleen. Extremities:    lower extremity edema    Skin:   Intact to general survey,    right lower drain site    Rehabilitation:  Physical Therapy:   Bed mobility:  Bed mobility  Bridging: Stand by assistance (08/04/22 1014)  Rolling to Left: Stand by assistance (08/05/22 1238)  Rolling to Right: Stand by assistance (08/05/22 1238)  Supine to Sit: Stand by assistance (08/05/22 1238)  Sit to Supine: Stand by assistance (08/05/22 1238)  Bed Mobility Comments: increased time and effort to complete all transitions. Hand over hand assist to complete each activity. (08/04/22 1014)  Bed Mobility  Additional Factors: Head of bed flat; With handrails;Verbal cues; Increased time to complete (08/06/22 1516)  Additional Factors: Head of bed flat; With handrails;Verbal cues; Increased time to complete (08/06/22 1516)  Roll Right  Assistance Level: Supervision (08/06/22 1516)  Supine to Sit  Assistance Level: Supervision (08/06/22 1516)  Scooting  Assistance Level: Supervision (08/06/22 1516)  Transfers:  Transfers  Sit to Stand: Stand by assistance (08/05/22 1444)  Stand to sit: Stand by assistance (08/05/22 1444)  Bed to Chair: Stand by assistance (08/05/22 1238)  Comment: VCs and demo for handplacement to improve safety and technique with fait follow through.  (08/05/22 1444)  Sit to Stand  Assistance Level: Supervision (08/06/22 1516)  Stand to Sit  Assistance Level: Supervision (08/06/22 1516)  Bed To/From Chair  Assistance Level: Supervision (08/06/22 1516)  Stand Pivot  Assistance Level: Contact guard assist (08/04/22 1312)  Gait:   Ambulation  WB Status: wbat (08/05/22 1443)  Ambulation  Surface: carpet (08/05/22 1443)  Device: Rolling Walker (08/05/22 1443)  Other Apparatus: O2 (08/05/22 1443)  Assistance: Contact guard assistance;Minimal assistance (08/05/22 1124)  Quality of Gait: Assist for management of O2 line. (08/05/22 1443)  Gait Deviations: Slow Raji;Decreased step length (08/05/22 1443)  Distance: 50ftx2 (08/05/22 1443)  Ambulation  Surface: Carpet (08/06/22 1517)  Device: Rolling walker (08/06/22 1517)  Distance: 100' (08/06/22 1517)  Activity: Within Room (08/06/22 1517)  Activity Comments: Reciprocal pattern (08/06/22 1517)  Additional Factors: Set-up; Verbal cues (08/06/22 1517)  Assistance Level: Stand by assist (08/06/22 1517)  Gait Deviations: Slow raji;Decreased step length bilateral;Decreased heel strike right;Decreased heel strike left; Wide base of support (08/06/22 1517)  Skilled Clinical Factors: Patient with AFO's donned Improved gait up to 150' Patient completes all transfers with supervision (08/06/22 1517)  Stairs:  Stairs/Curb  Stairs?: Yes (08/05/22 1242)  Stairs  # Steps : 4 (08/05/22 1242)  Stairs Height: 6\" (08/05/22 1242)  Rails: Bilateral (08/05/22 1242)  Assistance: Contact guard assistance (08/05/22 1242)  Stairs  Stair Height: 6'' (08/06/22 1517)  Device: Bilateral handrails (08/06/22 1517)  Number of Stairs: 4 (08/06/22 1517)  Additional Factors: Set-up; Verbal cues; Non-reciprocal going up;Non-reciprocal going down (08/06/22 1517)  Assistance Level: Contact guard assist (08/06/22 1517)  Skilled Clinical Factors: Pt reuiring CGA with first two steps, but then Min A with third and Mod with fourth. Pt only CGA with descending forward. (08/04/22 1525)  W/C mobility:       Occupational Therapy:   Hand Dominance: Right  ADL  Feeding: Setup (08/04/22 1114)  Grooming: Setup; Increased time to complete (08/04/22 1114)  UE Bathing: Stand by assistance; Increased time to complete (08/04/22 1114)  LE Bathing: Moderate assistance; Increased time to complete (08/04/22 1114)  UE Dressing: Contact guard assistance; Increased time to complete (08/04/22 1114)  LE Dressing: Maximum assistance; Increased time to complete (08/04/22 1114)  Toileting: Maximum assistance (08/04/22 1114)  Toilet Transfers  Toilet - Technique: Stand step (08/04/22 1115)  Equipment Used: Grab bars (08/04/22 1115)  Toilet Transfer: Minimal assistance (08/04/22 1115)  Tub Transfers  Tub Transfers: Not tested (08/04/22 1115)  Shower Transfers  Shower - Transfer From: Wheelchair (08/04/22 1115)  Shower - Transfer Type: To and From (08/04/22 1115)  Shower - Transfer To: Shower seat with back (08/04/22 1115)  Shower - Technique: Stand pivot (08/04/22 1115)  Shower Transfers: Minimal assistance (08/04/22 1115)    Speech Therapy:            Diet/Swallow:                      Lab/X-ray studies reviewed, analyzed and discussed with patient and staff:   Recent Results (from the past 24 hour(s))   Protime-INR    Collection Time: 08/07/22  5:34 AM   Result Value Ref Range    Protime 18.5 (H) 12.3 - 14.9 sec    INR 1.6        CT ABDOMEN PELVIS  7/29/2022  1. The previously placed abscess drain in the right lower quadrant appears to be in the periphery of the abscess with loculations now more apparent in the fluid collection. The fluid collection appears to be grossly the same size as prior to drainage. 2.New mural thrombus is noted in the distal aortic arch extending to the lower thoracic aorta which was not seen on prior study. 3.Note is again made of an aortic aneurysm involving the ascending aorta, aortic arch, and descending aorta. Based on current images, the ascending aorta appears to be 2 mm larger when compared to study dating February 17, 2021. The ascending aorta now measures 5.5 cm. Previously the arch measured 5.3 cm. 4.Note is again made of left lower small pleural effusion with bilateral basilar opacities which may represent atelectasis.    5.Note is again made of a large cystic mass in the pelvis. DIAGNOSIS: Previous cystic lesion adjacent to the ascending colon status post drain placement      Bibasilar atelectasis with left small pleural effusion. Underlying nodules cannot be excluded. The heart is enlarged. The liver is of normal size and attenuation without focal lesions. The spleen is of normal size and attenuation without focal lesions. Pancreas shows no signs of focal mass or peripancreatic fluid collection. Kidneys are normal in size. There is no hydronephrosis. No renal mass is identified. Adrenal glands are unremarkable. Note is again made of a thoracoabdominal aortic aneurysm. The ascending aorta now measures 5.5 cm, this is larger when compared to prior study dating 5.3 cm. 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 This large thrombus was not seen on prior study. No significant retroperitoneal adenopathy or ascites is identified. Again seen is a cystic lesion involving the proximal ascending aorta which now appears to be more septated than prior. The previously placed drain is now in the periphery of the lesion. CT PELVIS:  A large cystic mass is again seen in the lower pelvis. CT ABDOMEN PELVIS  : 7/18/2022    Residual intravenous contrast medium from recent CT examination identified. Lungs:  Lung bases clear. Cardiac size enlarged, unchanged. Calcification at level of mitral valve. Small hiatal hernia. Liver:  Normal in size, shape, and attenuation. Bile Ducts:  Normal in caliber. Gallbladder:  No stones or wall thickening. Pancreas:  Normal without masses, cysts, ductal dilatation or calcification. Spleen:  Normal in size without masses or calcifications. No splenules. Kidneys:  Normal in size. No hydronephrosis, masses, or stones. Adrenals:  Normal. Small bowel:  Normal in caliber. See \"peritoneum \". Appendix:  Not visualized. Colon:  Normal in caliber.  See \"peritoneum \". Peritoneum:  No free air. A bilobed, 10.0 x 8.0 x 10.6 cm mass having central rounded septated areas of low attenuation, 3.7 x 3.5 x 5.4 cm, and displacing cecum and ascending colon anteriorly (series 2, image 50, series 601, image 54, series 602, image 36). No calcification identified. Trace adjacent fat stranding demonstrated. Vessels: Aorta normal in course and caliber. Lymph nodes:  Retroperitoneal:  No enlarged retroperitoneal lymph nodes. Mesenteric:  No enlarged mesenteric lymph nodes. Pelvic: No enlarged pelvic lymph nodes. Ureters: Normal in course and caliber. No calcifications. Bladder: No wall thickening. Reproductive organs: 8.0 x 5.8 x 7.5 cm left adnexal cyst displacing urinary bladder right and laterally, anteriorly, and inferiorly (series 2, image 68, series 601, image 42, series 602, image 52). Abdominal Wall: Fat identified bilateral inguinal canals. Bones:  No bone lesions. Multilevel disc space narrowing lumbar spine. No post operative changes. Complex 10.0 x 8.0 x 10.6 cm mass with septated central low attenuation fluid collections as discussed. Mass displaces cecum and ascending colon anteriorly. Differential includes malignancy including appendiceal mucocele/carcinoma, as well as retroperitoneal sarcoma with central necrotic change. Infection/abscess secondary consideration, minimal presence of adjacent inflammatory change. 8.0 x 5.8 x 7.5 cm left adnexal cysts as discussed, most likely ovarian in etiology. If clinical concern warrants, pelvic sonography may be obtained for further evaluation. Other findings discussed. CT Head   7/18/2022     No acute changes are noted. There is no evidence for mass, mass effect or midline shift. No abnormal extra-axial fluid collections are appreciated. Age-appropriate cortical volume loss is seen.   There are patchy areas of hypoattenuation in a sub-cortical, central white and periventricular distribution consistent with nonspecific ischemic small vessel disease. Calcifications are again seen in the basal ganglia. There are no acute parenchymal alterations, blood byproducts or abnormal extracerebral fluid. The calvarium is grossly intact. The visualized paranasal sinuses show mucoceles or polyps in the maxillary sinuses. Mild fluid is seen in the mastoid air cells that is not significantly changed and is consistent with chronic changes. [place]  1.Age-appropriate cortical atrophy and periventricular microangiopathy. When compared to previous study there has been no significant interval change. 2. No acute hemorrhage or extra-axial fluid collection            CT ABDOMEN PELVIS   7/18/2022     1. In the right lower quadrant of the abdomen, pericolic abscesses versus pericolic mass is seen. This is thought more likely abscess. 2. A cystic mass is again seen in the pelvis and is likely in the ovary. It is unchanged from previous examination The patient will be given oral contrast and rescanned to evaluate the right lower quadrant. Previous extensive, complex labs, notes and diagnostics reviewed and analyzed. ALLERGIES:    Allergies as of 08/03/2022 - Fully Reviewed 08/03/2022   Allergen Reaction Noted    Latex  07/19/2017    Tape [adhesive tape]  06/28/2016      (please also verify by checking MAR)      Recently, I evaluated this patient for periodic reassessment of medical and functional status. The patient was discussed in detail at the treatment team meeting focusing on current medical issues, progress in therapies, social issues, psychological issues, barriers to progress and strategies to address these barriers, and discharge planning. See the hand written addendum to rehab progress note. The patient continues to be high risk for future disability and their medical and rehabilitation prognosis continue to be good and therefore, we will continue the patient's rehabilitation course as planned.   The patient's tentative discharge date was set. Patient and family education was discussed. The patient was made aware of the team discussion regarding their progress. Discharge plans were discussed along with barriers to progress and strategies to address these barriers, patient encouraged to continue to discuss discharge plans with . 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 Functional and medical status reassessed regarding patients ability to participate in therapies and patient found to be able to participate in acute intensive comprehensive inpatient rehabilitation program including PT/OT to improve balance, ambulation, ADLs, and to improve the P/AROM. Therapeutic modifications regarding activities in therapies, place, amount of time per day and intensity of therapy made daily. In bed therapies or bedside therapies prn. Bowel and Bladder dysfunction nocturia with severe urinary retention, Neurogenic bowel and bladder:  frequent toileting, ambulate to bathroom with assistance, check post void residuals. Check for C.difficile x1 if >2 loose stools in 24 hours, continue bowel & bladder program.  Monitor bowel and bladder function. Lactinex 2 PO every AC. MOM prn, Brown Bomb prn, Glycerin suppository prn, enema prn. Encourage therapy and nursing to co-treat and problem solve re continence. Recent UA was negative. Severe right Abd  pain as well as generalized OA pain: reassess pain every shift and prior to and after each therapy session, give prn Tylenol and consider scheduled Tylenol, modalities prn in therapy, masage, Lidoderm, K-pad prn. Consider scheduled AM pain meds. Skin healing right abd and breakdown risk:  continue pressure relief program.  Daily skin exams and reports from nursing.   Fatigue due to nutritional and hydration deficiency: Add and titrate vitamin B12 vitamin D and CoQ10 continue to monitor I&Os, calorie counts prn, dietary consult prn. Add healthy snack at night. Acute episodic insomnia with situational adjustment disorder:  prn Ambien, monitor for day time sedation. Falls risk elevated:  patient to use call light to get nursing assistance to get up, bed and chair alarm. Elevated DVT risk: progressive activities in PT, continue prophylaxis PORTILLO hose, elevation and  Lovenox to Coumadin . Complex discharge planning: Discharge August 17, 2022 to home alone with help from her son with home health care and video monitor. We are suggesting that patient should not be home alone for more than a few minutes. Apparently patient and family do not have that option. We are hoping that patient can go stay with one of her children though she is quite against the idea. He is competent to make her own decisions. Weekly team meeting every Monday to re-assess progress towards goals, discuss and address social, psychological and medical comorbidities and to address difficulties they may be having progressing in therapy. Patient and family education is in progress. The patient is to follow-up with their family physician after discharge. Complex Active General Medical Issues that complicate care Assess & Plan:     A-fib,   Essential hypertension, Acute on chronic combined systolic (congestive) and diastolic (congestive) heart failure-Acute rehab to monitor heart rate and rhythm with the option of telemetry and the effects of chronotropic medication with respect to increasing physical activity and exercise in PT, OT, ADLs with medication titration to lowest effective dosing. Continue blood signs every shift focusing on heart rate, rhythm and blood pressure checks with orthostatic checks-monitoring the effect of exercise, therapy and posture.   Consult hospitalist for backup medical and adjust/add medications (Lovenox transitioning to Coumadin patient had been on Xarelto, Coreg). Monitor heart rate and blood pressure as well as medications effects on vital signs before during and after therapy with especial focus on preventing orthostasis and falls risk. Hyponatremia-recheck BMP avoid excessive water  Anemia-Monitor vital signs monitor for orthostasis and tachycardia, check H&H prn. Vitamin B12 and iron-dose iron with food to prevent GI upset. Monitor for constipation. Monitor stools for blood. Hypothyroid-  Synthroid and titrate dosing. Follow vital signs, recheck TSH and thyroid studies as outpatient. 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 Osteoarthritis of lumbar spine with myelopathy-her extremity strengthening    Depression-emotional support provided daily, vitamin B12, encourage participation in rehabilitation support group and recreational therapy, adjust/add medications (B12, Celexa)  Abdominal abscess versus cancer-drain still in place pending cytology-patient is on IV Zosyn pending cultures-Pt has right abd abscess/mass. S/p drainage. SHe also has enlarging Thoraci Aortic   Aneurysm with large mural thrombus burden. Per GI surgery Dr. Gudelia Ling he would like to keep the drain is long as possible. Do not remove the drain. GERD-Elevate head of bed after meals, monitor stools for blood, lowest effective dose of PPI, consider Tums. Yeast rash and immunosuppression-Micatin powder and Floranex   AA now at 5.5cm up from 5.3cm but now there is a new large mural thrombus involving the descending portion of the aortic arch and extending how to the lower thoracic aorta along with large cystic mass. The thrombus was not seen on previous study. Patient had been on xarelto and now on Lovenox transitioning to Coumadin. . Given her abdominal pathology and large thrombus however patient due to her advanced age and high risk for any surgical intervention she is electing for conservative care.       INR 1.6  No urine culture, cont abx  Hct unchanged   Katherine Shirley D.O., PM&R     Attending    [address]

## 2022-08-07 NOTE — PROGRESS NOTES
Warfarin Dosing - Pharmacy Consult Note  Consulting Provider: Dr Linda Cotton    Indication:   thrombus  Warfarin Dose prior to admission: n/a   Concurrent anticoagulants/antiplatelets: n/a  Significant Drug Interactions: Celexa, Lovenox bridging  Recent Labs     08/05/22  0543 08/06/22  0442 08/07/22  0534   INR 1.1 1.4 1.6     Recent warfarin administrations                     warfarin (COUMADIN) tablet 7.5 mg (mg) 7.5 mg Given 08/06/22 1721    warfarin (COUMADIN) tablet 7.5 mg (mg) 7.5 mg Given 08/05/22 1627    warfarin (COUMADIN) tablet 7.5 mg (mg) 7.5 mg Given 08/04/22 1834                Date      INR       Dose  08/02/22   1.2       5 mg  08/03/22   1.1       5 mg  08/04/22   1.1    7.5 mg  08/05/22   1.1    7.5 mg   08/05/22   1.4    7.5 mg  08/06/22   1.6    7.5 mg  Assessment/Plan  (Goal INR: 2 - 3)  INR remains below goal, at 1.6 again today (goal range 2-3). Will repeat dose of warfarin 7.5 mg today only. Continue Lovenox bridging at this time. Active problem list reviewed. INR orders are placed. Chart reviewed for pertinent labs, drug/diet interactions, and past doses. Documentation of patient's clinical condition was reviewed. Pharmacy Dosing:  Pharmacy will continue to follow.       Ruthie Mtz [facility]  8/7/2022  3:43 PM

## 2022-08-07 NOTE — PROGRESS NOTES
Assessment completed. A&O x4. No c/o dizziness. Pt was medicated with PRN Antivert by previous nurse. Pt requesting to get PRN Antivert every morning before breakfast at 0700. Drsg to RUQ is clean, dry and intact. Serosanguinous drainage noted in bag of RUQ percutaneous drain. INR 1.6 today. Pharmacy dosing Coumadin. In chair with alarm activated. Call light in reach.  Electronically signed by Cari Cisse LPN on 9/5/8249 at 74:51 AM

## 2022-08-08 PROBLEM — K65.1 ABSCESS OF ABDOMINAL CAVITY (HCC): Status: ACTIVE | Noted: 2022-08-08

## 2022-08-08 PROBLEM — R10.9 ABDOMINAL PAIN: Status: ACTIVE | Noted: 2022-07-27

## 2022-08-08 LAB
INR BLD: 1.9
PROTHROMBIN TIME: 21.1 SEC (ref 12.3–14.9)

## 2022-08-08 PROCEDURE — APPNB15 APP NON BILLABLE TIME 0-15 MINS: Performed by: NURSE PRACTITIONER

## 2022-08-08 PROCEDURE — 2580000003 HC RX 258: Performed by: INTERNAL MEDICINE

## 2022-08-08 PROCEDURE — 97535 SELF CARE MNGMENT TRAINING: CPT

## 2022-08-08 PROCEDURE — 97112 NEUROMUSCULAR REEDUCATION: CPT

## 2022-08-08 PROCEDURE — 97530 THERAPEUTIC ACTIVITIES: CPT

## 2022-08-08 PROCEDURE — 97110 THERAPEUTIC EXERCISES: CPT

## 2022-08-08 PROCEDURE — 99233 SBSQ HOSP IP/OBS HIGH 50: CPT | Performed by: PHYSICAL MEDICINE & REHABILITATION

## 2022-08-08 PROCEDURE — 6370000000 HC RX 637 (ALT 250 FOR IP): Performed by: PHYSICAL MEDICINE & REHABILITATION

## 2022-08-08 PROCEDURE — 99232 SBSQ HOSP IP/OBS MODERATE 35: CPT | Performed by: INTERNAL MEDICINE

## 2022-08-08 PROCEDURE — 6370000000 HC RX 637 (ALT 250 FOR IP): Performed by: INTERNAL MEDICINE

## 2022-08-08 PROCEDURE — 1180000000 HC REHAB R&B

## 2022-08-08 PROCEDURE — 6360000002 HC RX W HCPCS: Performed by: INTERNAL MEDICINE

## 2022-08-08 PROCEDURE — 36415 COLL VENOUS BLD VENIPUNCTURE: CPT

## 2022-08-08 PROCEDURE — 85610 PROTHROMBIN TIME: CPT

## 2022-08-08 PROCEDURE — 97116 GAIT TRAINING THERAPY: CPT

## 2022-08-08 RX ORDER — MECLIZINE HCL 12.5 MG/1
12.5 TABLET ORAL
Status: DISCONTINUED | OUTPATIENT
Start: 2022-08-09 | End: 2022-08-18 | Stop reason: HOSPADM

## 2022-08-08 RX ORDER — WARFARIN SODIUM 5 MG/1
7.5 TABLET ORAL
Status: COMPLETED | OUTPATIENT
Start: 2022-08-08 | End: 2022-08-08

## 2022-08-08 RX ORDER — UBIDECARENONE 100 MG
100 CAPSULE ORAL
Status: DISCONTINUED | OUTPATIENT
Start: 2022-08-09 | End: 2022-08-18 | Stop reason: HOSPADM

## 2022-08-08 RX ADMIN — LACTOBACILLUS TAB 2 TABLET: TAB at 17:25

## 2022-08-08 RX ADMIN — Medication 10 ML: at 21:42

## 2022-08-08 RX ADMIN — MECLIZINE 12.5 MG: 12.5 TABLET ORAL at 06:36

## 2022-08-08 RX ADMIN — CARVEDILOL 6.25 MG: 6.25 TABLET, FILM COATED ORAL at 17:25

## 2022-08-08 RX ADMIN — ENOXAPARIN SODIUM 70 MG: 100 INJECTION SUBCUTANEOUS at 09:57

## 2022-08-08 RX ADMIN — LACTOBACILLUS TAB 2 TABLET: TAB at 21:39

## 2022-08-08 RX ADMIN — ENOXAPARIN SODIUM 70 MG: 100 INJECTION SUBCUTANEOUS at 21:39

## 2022-08-08 RX ADMIN — MICONAZOLE NITRATE: 2 POWDER TOPICAL at 21:42

## 2022-08-08 RX ADMIN — LEVOTHYROXINE SODIUM 88 MCG: 88 TABLET ORAL at 09:56

## 2022-08-08 RX ADMIN — FERROUS SULFATE TAB 325 MG (65 MG ELEMENTAL FE) 325 MG: 325 (65 FE) TAB at 09:56

## 2022-08-08 RX ADMIN — Medication 100 MG: at 09:56

## 2022-08-08 RX ADMIN — Medication 2000 UNITS: at 17:26

## 2022-08-08 RX ADMIN — CITALOPRAM HYDROBROMIDE 20 MG: 10 TABLET ORAL at 09:56

## 2022-08-08 RX ADMIN — CARVEDILOL 6.25 MG: 6.25 TABLET, FILM COATED ORAL at 09:57

## 2022-08-08 RX ADMIN — PANTOPRAZOLE SODIUM 40 MG: 40 TABLET, DELAYED RELEASE ORAL at 06:35

## 2022-08-08 RX ADMIN — WARFARIN SODIUM 7.5 MG: 5 TABLET ORAL at 17:25

## 2022-08-08 RX ADMIN — FUROSEMIDE 20 MG: 20 TABLET ORAL at 09:56

## 2022-08-08 RX ADMIN — CEFTRIAXONE SODIUM 1000 MG: 1 INJECTION, POWDER, FOR SOLUTION INTRAMUSCULAR; INTRAVENOUS at 17:30

## 2022-08-08 RX ADMIN — LACTOBACILLUS TAB 2 TABLET: TAB at 09:57

## 2022-08-08 ASSESSMENT — PAIN SCALES - GENERAL: PAINLEVEL_OUTOF10: 0

## 2022-08-08 ASSESSMENT — ENCOUNTER SYMPTOMS
STRIDOR: 0
COUGH: 0
BACK PAIN: 1
GASTROINTESTINAL NEGATIVE: 1
RESPIRATORY NEGATIVE: 1
NAUSEA: 0
WHEEZING: 0
SHORTNESS OF BREATH: 0
BLOOD IN STOOL: 0
CHEST TIGHTNESS: 0
EYES NEGATIVE: 1

## 2022-08-08 NOTE — PROGRESS NOTES
Walker  Transfer To: Shower chair with back  Additional Factors: With handrails  Assistance Level: Contact guard assist  Skilled Clinical Factors: CGA 2° frequent c/o dizziness    ASSESSMENT: Patient pleasant throughout session. Activity Tolerance: Patient tolerated treatment well      PLAN OF CARE:  Strengthening, Balance training, Functional mobility training, Neuromuscular re-education, Endurance training, Self-Care / ADL, Cognitive/Perceptual training, Safety education & training, Home management training, Patient/Caregiver education & training    Continue per OT POC for planned d/c on 22    Patient goals : \"to be able to wash my back and my feet. \"  Time Frame for Long term goals : 2 weeks  Long Term Goal 1: Pt within two weeks of evaluation will demonstrate progress to goals to address areas as stated below to increase functional independence for return to PLOF  Long Term Goal 2: Pt will increase independence with ADL Pt will increase independence with ADL transfers  Long Term Goal 3: Pt will increase bilateral UE strength to increase ease of ADL transfers        Therapy Time:   Individual Group Co-Treat   Time In 0830       Time Out 0930         Minutes 60                   ADL/IADL trainin minutes     Electronically signed by:    PRUDENCE Colunga,   2022, 10:23 AM

## 2022-08-08 NOTE — PROGRESS NOTES
Warfarin Dosing - Pharmacy Consult Note  Consulting Provider: Dr Dung Amaro    Indication:   thrombus  Warfarin Dose prior to admission: n/a   Concurrent anticoagulants/antiplatelets: n/a  Significant Drug Interactions: Celexa, Lovenox bridging  Recent Labs     08/06/22  0442 08/07/22  0534 08/08/22  0459   INR 1.4 1.6 1.9     Recent warfarin administrations                     warfarin (COUMADIN) tablet 7.5 mg (mg) 7.5 mg Given 08/07/22 1736    warfarin (COUMADIN) tablet 7.5 mg (mg) 7.5 mg Given 08/06/22 1721    warfarin (COUMADIN) tablet 7.5 mg (mg) 7.5 mg Given 08/05/22 1627                Date      INR       Dose  08/02/22   1.2       5 mg  08/03/22   1.1       5 mg  08/04/22   1.1    7.5 mg  08/05/22   1.1    7.5 mg   08/06/22   1.4    7.5 mg  08/07/22   1.6    7.5 mg  08/08/22    1.9   7.5mg   Assessment/Plan  (Goal INR: 2 - 3)  INR remains below goal, at 1.9 (goal range 2-3). Will repeat dose of warfarin 7.5 mg today only. Continue Lovenox bridging at this time. Active problem list reviewed. INR orders are placed. Chart reviewed for pertinent labs, drug/diet interactions, and past doses. Documentation of patient's clinical condition was reviewed. Pharmacy Dosing:  Pharmacy will continue to follow.       Lina Pedersen, [address] [facility]

## 2022-08-08 NOTE — PLAN OF CARE
Problem: Discharge Planning  Goal: Discharge to home or other facility with appropriate resources  8/8/2022 1304 by Wilner Lemus RN  Outcome: Progressing  8/7/2022 2319 by Magdaleno Shay RN  Outcome: Progressing  Flowsheets (Taken 8/7/2022 2010)  Discharge to home or other facility with appropriate resources: Identify barriers to discharge with patient and caregiver     Problem: ABCDS Injury Assessment  Goal: Absence of physical injury  8/8/2022 1304 by Wilner Lemus RN  Outcome: Progressing  8/7/2022 2319 by Magdaleno Shay RN  Outcome: Progressing     Problem: Skin/Tissue Integrity  Goal: Absence of new skin breakdown  Description: 1. Monitor for areas of redness and/or skin breakdown  2. Assess vascular access sites hourly  3. Every 4-6 hours minimum:  Change oxygen saturation probe site  4. Every 4-6 hours:  If on nasal continuous positive airway pressure, respiratory therapy assess nares and determine need for appliance change or resting period.   8/8/2022 1304 by Wilner Lemus RN  Outcome: Progressing  8/7/2022 2319 by Magdaleno Shay RN  Outcome: Progressing     Problem: Chronic Conditions and Co-morbidities  Goal: Patient's chronic conditions and co-morbidity symptoms are monitored and maintained or improved  8/8/2022 1304 by Wilner Lemus RN  Outcome: Progressing  8/7/2022 2319 by Magdaleno Shay RN  Outcome: Progressing  Flowsheets (Taken 8/7/2022 2010)  Care Plan - Patient's Chronic Conditions and Co-Morbidity Symptoms are Monitored and Maintained or Improved: Monitor and assess patient's chronic conditions and comorbid symptoms for stability, deterioration, or improvement

## 2022-08-08 NOTE — PROGRESS NOTES
Subjective: The patient complains of severe acute on chronic progressive fatigue and  RLQ abdominal pain partially relieved by rest, medications, PT,  OT,     and rest and exacerbated by recent illness -she was initially admitted through the ER at [facility] complaining of abdominal pain for the past 5 to 7 days. She also complained of headache and dizziness. Imaging revealed an abdominal mass felt to be either cancer or an abscess. Imaging was thinking it was more like an abscess. She was admitted to [facility] placed on antibiotics and a drain was placed under interventional radiology's guidance. She was transferred off the rehab unit because of findings on a CT abdomen which showed aneurysm and clots however she was felt to be nonoperative given her age and complex medical status. Her family does not want to pursue aggressive treatment and she is very glad to be back on the rehabilitation unit and is medically stable and ready to participate. She still has her abdominal drain and I discussed with them that interventional radiology who had been under the impression the patient was going to the [facility] clinic for further operative care. Now that she is not we will touch base with general surgery to see if they would be comfortable with this getting it out. .      I am concerned about patients medical complexities and barriers to advancing in rehab goals including  drain for Abd abscess. .        I reviewed current care and plans for further care with other rehab providers including nursing and case management. According to recent nursing note, \" Pt denies any pain or SOB. assisted pt to change into hospital gown- assisted from W/C to BR back to bed -pt with good safety caution with W/W. Call light with in reach and bedside table in reach. Again at 2300 assisted pt to BR.\".      She is getting dizzy especially if he eats she does not get her Antivert in the morning and therapy starts before she can get the Antivert therefore I have asked my shift to give and schedule basis before breakfast and before shift change. ROS x10: The patient also complains of severely impaired mobility and activities of daily living. Otherwise no new problems with vision, hearing, nose, mouth, throat, dermal, cardiovascular, GI, , pulmonary, musculoskeletal, psychiatric or neurological. See also Acute Rehab PM&R H&P. Vital signs:  /88   Pulse 70   Temp 98.2 °F (36.8 °C)   Resp 18   Ht 5' (1.524 m)   Wt 164 lb (74.4 kg)   SpO2 93%   BMI 32.03 kg/m²   I/O:   PO/Intake:  fair PO intake,   Reg diet    Bowel:   continent   Bladder: continent      retention  General:  Patient is well developed,   adequately nourished, and    well kempt. HEENT:    Pupils equal, hearing intact to loud voice, external inspection of ear and nose benign. Inspection of lips, tongue and gums benign    Musculoskeletal: No significant change in strength or tone. All joints stable. Inspection and palpation of digits and nails show no clubbing, cyanosis or inflammatory conditions. Neuro/Psychiatric: Affect: flat but pleasant. Alert and oriented to person, place and situation with  no needded cues. No significant change in deep tendon reflexes or sensation  Lungs:  Diminished, CTA-B. Respiration effort is   normal at rest.     Heart:   S1 = S2,    irreg. Abdomen:  Soft, RLQ-tender, no enlargement of liver or spleen.   Extremities:    lower extremity edema    Skin:   Intact to general survey,    right lower drain site    Rehabilitation:  Physical Therapy:   Bed mobility:  Bed mobility  Bridging: Stand by assistance (08/04/22 1014)  Rolling to Left: Stand by assistance (08/05/22 1238)  Rolling to Right: Stand by assistance (08/05/22 1238)  Supine to Sit: Stand by assistance (08/05/22 1238)  Sit to Supine: Stand by assistance (08/05/22 1238)  Bed Mobility Comments: increased time and effort to complete all transitions. Hand over hand assist to complete each activity. (08/04/22 1014)  Bed Mobility  Additional Factors: Head of bed flat; With handrails;Verbal cues; Increased time to complete (08/06/22 1516)  Additional Factors: Head of bed flat; With handrails;Verbal cues; Increased time to complete (08/06/22 1516)  Roll Right  Assistance Level: Supervision (08/06/22 1516)  Supine to Sit  Assistance Level: Supervision (08/06/22 1516)  Scooting  Assistance Level: Supervision (08/06/22 1516)  Transfers:  Transfers  Sit to Stand: Stand by assistance (08/05/22 1444)  Stand to sit: Stand by assistance (08/05/22 1444)  Bed to Chair: Stand by assistance (08/05/22 1238)  Comment: VCs and demo for handplacement to improve safety and technique with fait follow through. (08/05/22 1444)  Sit to Stand  Assistance Level: Supervision (08/06/22 1516)  Stand to Sit  Assistance Level: Supervision (08/06/22 1516)  Bed To/From Chair  Assistance Level: Supervision (08/06/22 1516)  Stand Pivot  Assistance Level: Contact guard assist (08/04/22 1312)  Gait:   Ambulation  WB Status: wbat (08/05/22 1443)  Ambulation  Surface: carpet (08/05/22 1443)  Device: Rolling Walker (08/05/22 1443)  Other Apparatus: O2 (08/05/22 1443)  Assistance: Contact guard assistance;Minimal assistance (08/05/22 1124)  Quality of Gait: Assist for management of O2 line. (08/05/22 1443)  Gait Deviations: Slow Raji;Decreased step length (08/05/22 1443)  Distance: 50ftx2 (08/05/22 1443)  Ambulation  Surface: Carpet (08/06/22 1517)  Device: Rolling walker (08/06/22 1517)  Distance: 100' (08/06/22 1517)  Activity: Within Room (08/06/22 1517)  Activity Comments: Reciprocal pattern (08/06/22 1517)  Additional Factors: Set-up; Verbal cues (08/06/22 1517)  Assistance Level: Stand by assist (08/06/22 1517)  Gait Deviations: Slow raji;Decreased step length bilateral;Decreased heel strike right;Decreased heel strike left; Wide base of support (08/06/22 1517)  Skilled Clinical and staff:   Recent Results (from the past 24 hour(s))   Protime-INR    Collection Time: 08/08/22  4:59 AM   Result Value Ref Range    Protime 21.1 (H) 12.3 - 14.9 sec    INR 1.9        CT ABDOMEN PELVIS  7/29/2022  1. The previously placed abscess drain in the right lower quadrant appears to be in the periphery of the abscess with loculations now more apparent in the fluid collection. The fluid collection appears to be grossly the same size as prior to drainage. 2.New mural thrombus is noted in the distal aortic arch extending to the lower thoracic aorta which was not seen on prior study. 3.Note is again made of an aortic aneurysm involving the ascending aorta, aortic arch, and descending aorta. Based on current images, the ascending aorta appears to be 2 mm larger when compared to study dating February 17, 2021. The ascending aorta now measures 5.5 cm. Previously the arch measured 5.3 cm. 4.Note is again made of left lower small pleural effusion with bilateral basilar opacities which may represent atelectasis. 5.Note is again made of a large cystic mass in the pelvis. DIAGNOSIS: Previous cystic lesion adjacent to the ascending colon status post drain placement      Bibasilar atelectasis with left small pleural effusion. Underlying nodules cannot be excluded. The heart is enlarged. The liver is of normal size and attenuation without focal lesions. The spleen is of normal size and attenuation without focal lesions. Pancreas shows no signs of focal mass or peripancreatic fluid collection. Kidneys are normal in size. There is no hydronephrosis. No renal mass is identified. Adrenal glands are unremarkable. Note is again made of a thoracoabdominal aortic aneurysm. The ascending aorta now measures 5.5 cm, this is larger when compared to prior study dating 5.3 cm.  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 This large thrombus was not seen on prior study. No significant retroperitoneal adenopathy or ascites is identified. Again seen is a cystic lesion involving the proximal ascending aorta which now appears to be more septated than prior. The previously placed drain is now in the periphery of the lesion. CT PELVIS:  A large cystic mass is again seen in the lower pelvis. CT ABDOMEN PELVIS  : 7/18/2022    Residual intravenous contrast medium from recent CT examination identified. Lungs:  Lung bases clear. Cardiac size enlarged, unchanged. Calcification at level of mitral valve. Small hiatal hernia. Liver:  Normal in size, shape, and attenuation. Bile Ducts:  Normal in caliber. Gallbladder:  No stones or wall thickening. Pancreas:  Normal without masses, cysts, ductal dilatation or calcification. Spleen:  Normal in size without masses or calcifications. No splenules. Kidneys:  Normal in size. No hydronephrosis, masses, or stones. Adrenals:  Normal. Small bowel:  Normal in caliber. See \"peritoneum \". Appendix:  Not visualized. Colon:  Normal in caliber. See \"peritoneum \". Peritoneum:  No free air. A bilobed, 10.0 x 8.0 x 10.6 cm mass having central rounded septated areas of low attenuation, 3.7 x 3.5 x 5.4 cm, and displacing cecum and ascending colon anteriorly (series 2, image 50, series 601, image 54, series 602, image 36). No calcification identified. Trace adjacent fat stranding demonstrated. Vessels: Aorta normal in course and caliber. Lymph nodes:  Retroperitoneal:  No enlarged retroperitoneal lymph nodes. Mesenteric:  No enlarged mesenteric lymph nodes. Pelvic: No enlarged pelvic lymph nodes. Ureters: Normal in course and caliber. No calcifications. Bladder: No wall thickening. Reproductive organs: 8.0 x 5.8 x 7.5 cm left adnexal cyst displacing urinary bladder right and laterally, anteriorly, and inferiorly (series 2, image 68, series 601, image 42, series 602, image 52).  Abdominal Wall: Fat identified bilateral inguinal canals. Bones:  No bone lesions. Multilevel disc space narrowing lumbar spine. No post operative changes. Complex 10.0 x 8.0 x 10.6 cm mass with septated central low attenuation fluid collections as discussed. Mass displaces cecum and ascending colon anteriorly. Differential includes malignancy including appendiceal mucocele/carcinoma, as well as retroperitoneal sarcoma with central necrotic change. Infection/abscess secondary consideration, minimal presence of adjacent inflammatory change. 8.0 x 5.8 x 7.5 cm left adnexal cysts as discussed, most likely ovarian in etiology. If clinical concern warrants, pelvic sonography may be obtained for further evaluation. Other findings discussed. CT Head   7/18/2022  1. Age-appropriate cortical atrophy and periventricular microangiopathy. When compared to previous study there has been no significant interval change. 2. No acute hemorrhage or extra-axial fluid collection            CT ABDOMEN PELVIS   7/18/2022     1. In the right lower quadrant of the abdomen, pericolic abscesses versus pericolic mass is seen. This is thought more likely abscess. 2. A cystic mass is again seen in the pelvis and is likely in the ovary. It is unchanged from previous examination The patient will be given oral contrast and rescanned to evaluate the right lower quadrant. Previous extensive, complex labs, notes and diagnostics reviewed and analyzed. ALLERGIES:    Allergies as of 08/03/2022 - Fully Reviewed 08/03/2022   Allergen Reaction Noted    Latex  07/19/2017    Tape [adhesive tape]  06/28/2016      (please also verify by checking MAR)      Today I evaluated this patient for periodic reassessment of medical and functional status.   The patient was discussed in detail at the treatment team meeting focusing on current medical issues, progress in therapies, social issues, psychological issues, barriers to progress and strategies to address these barriers, and discharge planning. See the addendum to rehab progress note-as a second progress note in the chart. The patient continues to be high risk for future disability and their medical and rehabilitation prognosis continue to be good and therefore, we will continue the patient's rehabilitation course as planned. The patient's tentative discharge date was set. Patient and family education was discussed. The patient was made aware of the team discussion regarding their progress. 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 Functional and medical status reassessed regarding patients ability to participate in therapies and patient found to be able to participate in acute intensive comprehensive inpatient rehabilitation program including PT/OT to improve balance, ambulation, ADLs, and to improve the P/AROM. Therapeutic modifications regarding activities in therapies, place, amount of time per day and intensity of therapy made daily. In bed therapies or bedside therapies prn. Bowel and Bladder dysfunction nocturia with severe urinary retention, Neurogenic bowel and bladder:  frequent toileting, ambulate to bathroom with assistance, check post void residuals. Check for C.difficile x1 if >2 loose stools in 24 hours, continue bowel & bladder program.  Monitor bowel and bladder function. Lactinex 2 PO every AC. MOM prn, Brown Bomb prn, Glycerin suppository prn, enema prn. Encourage therapy and nursing to co-treat and problem solve re continence. Recent UA was negative. Severe right Abd  pain as well as generalized OA pain: reassess pain every shift and prior to and after each therapy session, give prn Tylenol and consider scheduled Tylenol, modalities prn in therapy, masage, Lidoderm, K-pad prn. Consider scheduled AM pain meds.   Skin healing right abd and breakdown risk:  continue pressure relief program.  Daily skin exams and reports from nursing. Fatigue due to nutritional and hydration deficiency: Add and titrate vitamin B12 vitamin D and CoQ10 continue to monitor I&Os, calorie counts prn, dietary consult prn. Add healthy snack at night. Acute episodic insomnia with situational adjustment disorder:  prn Ambien, monitor for day time sedation. Falls risk elevated:  patient to use call light to get nursing assistance to get up, bed and chair alarm. Elevated DVT risk: progressive activities in PT, continue prophylaxis PORTILLO hose, elevation and  Lovenox to Coumadin . Complex discharge planning: Discharge August 17, 2022 to home alone with help from her son with home health care and video monitor. We are suggesting that patient should not be home alone for more than a few minutes. Apparently patient and family do not have that option. We are hoping that patient can go stay with one of her children though she is quite against the idea. He is competent to make her own decisions. Weekly team meeting every Monday to re-assess progress towards goals, discuss and address social, psychological and medical comorbidities and to address difficulties they may be having progressing in therapy. Patient and family education is in progress. The patient is to follow-up with their family physician after discharge. Complex Active General Medical Issues that complicate care Assess & Plan:     A-fib,   Essential hypertension, Acute on chronic combined systolic (congestive) and diastolic (congestive) heart failure-Acute rehab to monitor heart rate and rhythm with the option of telemetry and the effects of chronotropic medication with respect to increasing physical activity and exercise in PT, OT, ADLs with medication titration to lowest effective dosing.   Continue blood signs every shift focusing on heart rate, rhythm and blood pressure checks with orthostatic checks-monitoring the effect of exercise, therapy and posture. Consult hospitalist for backup medical and adjust/add medications (Lovenox transitioning to Coumadin patient had been on Xarelto, Coreg). Monitor heart rate and blood pressure as well as medications effects on vital signs before during and after therapy with especial focus on preventing orthostasis and falls risk. Hyponatremia-recheck BMP avoid excessive water  Anemia-Monitor vital signs monitor for orthostasis and tachycardia, check H&H prn. Vitamin B12 and iron-dose iron with food to prevent GI upset. Monitor for constipation. Monitor stools for blood. Hypothyroid-  Synthroid and titrate dosing. Follow vital signs, recheck TSH and thyroid studies as outpatient. 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 Osteoarthritis of lumbar spine with myelopathy-her extremity strengthening    Depression-emotional support provided daily, vitamin B12, encourage participation in rehabilitation support group and recreational therapy, adjust/add medications (B12, Celexa)  Abdominal abscess versus cancer-drain still in place pending cytology-patient is on IV Zosyn pending cultures-Pt has right abd abscess/mass. S/p drainage. SHe also has enlarging Thoraci Aortic   Aneurysm with large mural thrombus burden. Per GI surgery Dr. Monica Galicia he would like to keep the drain is long as possible. Do not remove the drain. GERD-Elevate head of bed after meals, monitor stools for blood, lowest effective dose of PPI, consider Tums. Yeast rash and immunosuppression-Micatin powder and Floranex   AA now at 5.5cm up from 5.3cm but now there is a new large mural thrombus involving the descending portion of the aortic arch and extending how to the lower thoracic aorta along with large cystic mass. The thrombus was not seen on previous study.  Patient had been on xarelto and now on Lovenox transitioning to Coumadin. . Given her abdominal pathology and large thrombus however patient due to her advanced age and high risk for any surgical intervention she is electing for conservative care. Dizziness and gait ataxia-She is getting dizzy especially if he eats she does not get her Antivert in the morning and therapy starts before she can get the Antivert therefore I have asked my shift to give and schedule basis before breakfast and before shift change. Focus on endurance, activity pacing, reassessing rehab goals and discharge planning.         Electronically signed by Uriel Park DO on 8/5/22 at 4:51 AM KIRA Webber D.O., PM&R     Attending    [address]

## 2022-08-08 NOTE — PROGRESS NOTES
INDIVIDUALIZED OVERALL REHAB PLAN OF CARE  ADDENDUM TO REHAB PROGRESS NOTE-for audit purposes must also refer to this day's clinical note and combine the information      Date: 2022  Patient Name: Lennon Galeazzi   Room: Q420/C289-72    MRN: [id number]    : 1932  (80 y.o.)  Gender: female       Today 2022 during weekly team meeting, I reviewed the patient Lennon Galeazzi in detail with the therapists and nurses involved in patient's care gathering complex physiatric data regarding current medical issues, progress in therapies, factors limiting progress, social issues, psychological issues, ongoing therapeutic plans and discharge planning. Legend:  I= independent Im =Modified independent  S=Supervised SB=stand by MAGANA=set up CG=contact fran Min= minimal Mod=Moderate Max=maximal Max of 2 =maximal assist of 2 people      CURRENT FUNCTIONAL STATUS:    Barriers to progress and discharge complex medical conditions, severe pain, and complex social situations      NURSING ISSUES:    She is getting dizzy especially if he eats she does not get her Antivert in the morning and therapy starts before she can get the Antivert therefore I have asked my shift to give and schedule basis before breakfast and before shift change. Per abdominal surgery consult with Dr. Jorge Alberto Castillo he would like the drain to stay in as long as possible. He describes her as a palliative patient without a surgical plan for her numerous medical problems. Pt denies any pain or SOB. assisted pt to change into hospital gown- assisted from W/C to BR back to bed -pt with good safety caution with W/W. Call light with in reach and bedside table in reach. Again at 2300 assisted pt to BR. Her daughter has been translating because patient has been refusing the video translation service. Apparently there is an issue with the [place] dialect.   She has a preference for an old world [place] dialect she is 80years old and apparently that dialect is no longer spoken in the any type of translation service that we can find. For now we are doing her best with having the daughter present and we are reaching out to any other and all translation services. We have had translators in the past that could not understand or translate this version of [place]. She is able to understand basic [place] so there is the possibility of using that though she has been refusing it. Nursing will continue to focus on bowel and bladder continence transitioning toward independence by time of discharge. Monitoring post void residuals monitoring for severe constipation and bowel obstruction. Bowel function- continent/normal  Plans to address- teach spinal cord style bowel program     Bladder function-  continent    Plans to address- schedule voids before bed and therapy     Skin deficits- dressing changes   Plans to address- special mattress     Hydration/Nutritional deficits- monitoring for dysphagia  Plans to address-Push PO, assist with feeds as needed     Low BP- continue to monitor Ortho BPs  Plans to address- check ortho BPs before therapies-and use abdominal binder and TEDs as needed    Pt and Family training goals-  teach home technology options like Pretty use and home assistive devices      Focus on achieving ADL goals with co-treating with OT when possible. Focus on cognition and co treat with SLP when possible. PHYSICAL THERAPY  Bed mobility:  Bed mobility  Bridging: Stand by assistance (08/04/22 1014)  Rolling to Left: Stand by assistance (08/05/22 1238)  Rolling to Right: Stand by assistance (08/05/22 1238)  Supine to Sit: Stand by assistance (08/05/22 1238)  Sit to Supine: Stand by assistance (08/05/22 1238)  Bed Mobility Comments: increased time and effort to complete all transitions. Hand over hand assist to complete each activity. (08/04/22 1014)  Bed Mobility  Additional Factors: Head of bed flat; With handrails;Verbal cues; Increased time to complete (08/06/22 1516)  Additional Factors: Head of bed flat; With handrails;Verbal cues; Increased time to complete (08/06/22 1516)  Roll Right  Assistance Level: Supervision (08/06/22 1516)  Supine to Sit  Assistance Level: Supervision (08/06/22 1516)  Scooting  Assistance Level: Supervision (08/06/22 1516)  Transfers:  Transfers  Sit to Stand: Stand by assistance (08/05/22 1444)  Stand to sit: Stand by assistance (08/05/22 1444)  Bed to Chair: Stand by assistance (08/05/22 1238)  Comment: VCs and demo for handplacement to improve safety and technique with fait follow through. (08/05/22 1444)  Sit to Stand  Assistance Level: Supervision (08/06/22 1516)  Stand to Sit  Assistance Level: Supervision (08/06/22 1516)  Bed To/From Chair  Assistance Level: Supervision (08/06/22 1516)  Stand Pivot  Assistance Level: Contact guard assist (08/04/22 1312)  Gait:   Ambulation  WB Status: wbat (08/05/22 1443)  Ambulation  Surface: carpet (08/05/22 1443)  Device: Rolling Walker (08/05/22 1443)  Other Apparatus: O2 (08/05/22 1443)  Assistance: Contact guard assistance;Minimal assistance (08/05/22 1124)  Quality of Gait: Assist for management of O2 line. (08/05/22 1443)  Gait Deviations: Slow Raji;Decreased step length (08/05/22 1443)  Distance: 50ftx2 (08/05/22 1443)  Ambulation  Surface: Carpet (08/06/22 1517)  Device: Rolling walker (08/06/22 1517)  Distance: 100' (08/06/22 1517)  Activity: Within Room (08/06/22 1517)  Activity Comments: Reciprocal pattern (08/06/22 1517)  Additional Factors: Set-up; Verbal cues (08/06/22 1517)  Assistance Level: Stand by assist (08/06/22 1517)  Gait Deviations: Slow raji;Decreased step length bilateral;Decreased heel strike right;Decreased heel strike left; Wide base of support (08/06/22 1517)  Skilled Clinical Factors: Patient with AFO's donned Improved gait up to 150' Patient completes all transfers with supervision (08/06/22 1835)  Stairs:  Stairs/Curb  Stairs?: Yes (08/05/22 3542)  Stairs  # Steps : 4 (08/05/22 1242)  Stairs Height: 6\" (08/05/22 1242)  Rails: Bilateral (08/05/22 1242)  Assistance: Contact guard assistance (08/05/22 1242)  Stairs  Stair Height: 6'' (08/06/22 1517)  Device: Bilateral handrails (08/06/22 1517)  Number of Stairs: 4 (08/06/22 1517)  Additional Factors: Set-up; Verbal cues; Non-reciprocal going up;Non-reciprocal going down (08/06/22 1517)  Assistance Level: Contact guard assist (08/06/22 1517)  Skilled Clinical Factors: Pt reuiring CGA with first two steps, but then Min A with third and Mod with fourth. Pt only CGA with descending forward. (08/04/22 1525)  W/C mobility:           Pt and Family training goals-  Focus on sequencing and endurance        OCCUPATIONAL THERAPY  Grooming/Oral Hygiene  Assistance Level: Set-up (08/05/22 1348)  Upper Extremity Bathing  Assistance Level: Supervision (08/05/22 1348)  Skilled Clinical Factors: Patient completed sponge bath seated in w/c at bathrooom sink (08/05/22 1348)  Lower Extremity Bathing  Assistance Level: Minimal assistance (08/05/22 1348)  Skilled Clinical Factors: B feet - CGA for balance (08/05/22 1348)  Upper Extremity Dressing  Assistance Level: Set-up (08/05/22 1348)  Lower Extremity Dressing  Assistance Level: Moderate assistance (08/05/22 1348)  Skilled Clinical Factors: thread R LE x 1/1 - pull up x 1/2 (08/05/22 1348)  Putting On/Taking Off Footwear  Assistance Level: Dependent (08/05/22 1348)  Skilled Clinical Factors: B socks - shoes with braces (08/05/22 1348)      Plans for addressing ADL deficits affecting: Focus on sequencing.         Bladder function disrupting functional progress- continent      Plans to address- coordinate scheduled voids before bed and therapy with nursing     Skin deficits- complex wound dressing changes affecting ADLs   Plans to address- coordinate patient dressing changes education with nursing as part of ADL training                SPEECH THERAPY/SLP             Plans for cognitive and communication issues affecting addressing:   Pt and Family training goals-  teach home tecnology options like Pretty use and home assistive devices       Diet/Swallow:                           COGNITION  OT: Cognition Comment: Comp Sup, expression Mod I, social Ind, problem min A, Memory Sup  SP:        Social History     Socioeconomic History    Marital status:       Spouse name: Not on file    Number of children: 2    Years of education: Not on file    Highest education level: Not on file   Occupational History    Not on file   Tobacco Use    Smoking status: Never    Smokeless tobacco: Never   Substance and Sexual Activity    Alcohol use: No     Alcohol/week: 0.0 standard drinks    Drug use: No    Sexual activity: Not on file   Other Topics Concern    Not on file   Social History Narrative    , Lives With: Alone, son lives down the street, dtr is in the area    Type of Home: [address]: One level    Home Access: Stairs to enter with rails- Number of Steps: 2- Rails: Both    Bathroom Shower/Tub: Tub/Shower unit, Bathroom Equipment: Grab bars in shower, Shower chair    Home Equipment: Rolling walker, Cane(Pt infrequemtly uses DME for ambulation and prefers to furniture walk in home)    ADL Assistance: Independent, [address]: Independent    Homemaking Responsibilities: Yes    Ambulation Assistance: Independent, Transfer Assistance: Independent    Additional Comments: Son stops over 2 times daily         Social Determinants of Health     Financial Resource Strain: Not on file   Food Insecurity: Not on file   Transportation Needs: Not on file   Physical Activity: Not on file   Stress: Not on file   Social Connections: Not on file   Intimate Partner Violence: Not on file   Housing Stability: Not on file           THERAPY, MEDICAL AND NURSING COORDINATION:    [x]  Pain medication before therapies     [x]  Check orthostatic BP and monitor heart rate and medications effects with therapy      [x]  Ambulate to the bathroom in room    [x]  Add scheduled rest beaks     [x]  In room therapies as needed      Discharge date set for:               August 17, 2022      Home with:    Alone vs with dtr  with help from   dtr  and son          And:     [address]:     [x]  PT    []  OT    []  ST   [x]  Aide   []  SW    [x]  RN               Or preferably     Outpatient Therapy:  []  PT    []  OT    []  ST   []  Rehab Psych                 Equipment:  [employer]      At D/C their function is goaled at:   PT:Long term goal 1: Pt to complete bed mobility with indep  Long term goal 2: Pt to complete transfers with indep  Long term goal 3: Pt to ambulate 50ft with LRD and indep  Long term goal 4: Pt to manage 2 steps with B HR and Supervision  Long term goal 5: Pt to complete TUG within 20sec to demonstrate improved functional mobility/balance  OT: ,  ,  ,     ,  ,  ,    SP:               From a cognitive standpoint they will need:        24 hr supervision  --progress to occasional            Significant problems/ barriers to functional progress include: Pt is at a high risk for functional loss,      [x]  Acute infection/UTI    []  Low BP's     []  COPD flare-up   []  Uncontrolled blood sugar     []  Progressive anemia     [x]  poor endurance    [x]  Severe pain exacerbation     [x]  Impaired mental status-mild    []  Urinary incontinence    []  Bowel incontinence           Plan to correct barriers to functional progress: Add scheduled rest breaks, CoQ 10 and Vit B 12, control pain by using ice Lidoderm rest and massage as well as pain medications prior to therapy. Spread therapy of 15 hours out over a 7 day window to accommodate rest breaks and medical interventions. Patient seems to be making fair to good response to these interventions.          Based on a comprehensive evaluation of the above, the individualized therapy and Discharge plan will be:    -Times stated are an average that will be varied based on the patient's daily need. PT    1 1/2  hrs/day 5-7 days per week      OT    1 1/2 hrs per day 5-7 days per week     ST     1/2    hrs /day 3-5 days per week       Estimated LOS   2 week(s)    - Overall functional prognosis:     [x]  Good    []  Fair    []  Poor -Medical Prognosis:   [x]  Good    []  Fair    []  Poor    This patient was made aware of the discussion of Plan of Care, their projected dicharge date and their projected function at discharge.          Kanika Yin DO

## 2022-08-08 NOTE — PROGRESS NOTES
Patient was taken off IR services. Requested ADDIE Boogie place patient back on service to manage abscess drain. Attempted to see patient, she was in [facility] using bathroom, therefore could not see patient. ADDIE Boogie states abscess drain continues to drain on average 15-20 cc fluid/shift, therefore cannot consider drain removal yet.

## 2022-08-08 NOTE — PROGRESS NOTES
Occupational Therapy Get up and Go Note            Date: 2022  Patient Name: Mandy Correia        MRN: [id number]    Account #: [de-identified]  : 1932  (80 y.o.)      Subjective:  Patient states:  Bathroom  Pain:  Pain at start of treatment: No    Pain at end of treatment: No          Objective:  ADL:  Feeding:  set up--assist to open all containers  Toileting:  Lorri  Toilet transfers:  SBA    Pt completed bed mobility and SPT from EOB to wc at SBA level with verbal cues for safety. Treatment consisted of:   [x] ADL Training  [] Strengthening   [] Transfer Training    [] DME Education  [] HEP   [] Patient Education  [] Other:    Safety:  Safety Devices  Safety Devices in place:   Type of devices:  All fall risk precautions in place      Therapy Time:   Individual Group Co-Treat   Time In 0730       Time Out 0746         Minutes 16             ADL/IADL trainin minutes         Electronically signed by:    Ted Hernandez OT    2022, 8:11 AM

## 2022-08-08 NOTE — PROGRESS NOTES
OCCUPATIONAL THERAPY  INPATIENT REHAB TREATMENT NOTE  [facility]      NAME: Thuy Castillo  : 1932 (80 y.o.)  MRN: [id number]  CODE STATUS: DNR-CC  Room: R250/R250-01    Date of Service: 2022    Referring Physician: Dr. Armando Memory Diagnosis: Impaired mobility and ADL's due to Charcot [place] Neuropathy    Restrictions  Restrictions/Precautions  Restrictions/Precautions: Fall Risk  Required Braces or Orthoses?: No     Patient's date of birth confirmed: Yes    SAFETY:  Safety Devices  Safety Devices in place: Yes  Type of devices: All fall risk precautions in place    SUBJECTIVE:  Subjective: \" I know. You Noemi.\"     Pain at start of treatment:  No 0/10    Pain at end of treatment:   Yes    Pain Location: B forearms  Pain Descriptors: unable to rate  Nursing notified: no  Intervention: None    COGNITION:  Orientation  Overall Orientation Status: Within Functional Limits  Cognition  Overall Cognitive Status: Exceptions  Cognition Comment: Comp Sup, expression Mod I, social Ind, problem min A, Memory Sup    OBJECTIVE:    Ring Tree Activity:  Patient engaged in B UE ROM/strengthening, B FM coordination and cognition to increase I with ADL's, IADL's and transfers. Patient instructed to place rings on horizontal dowel rods in various vertical planes. Patient donned B 1/2 # wrist weights. Patient able to place 68 rings 1 at a time on horizontal dowel rods with MOD difficulty with vertical reaching. Patient with MIN difficulty with lateral reaching. Patient with 0 difficulty picking up rings from table top. Patient instructed to remove rings 1 at a time and place in container. Patient with MOD UE reaching difficulty removing rings. Frequent rest breaks required. ASSESSMENT: Patient pleasant throughout session.     Activity Tolerance: Patient tolerated treatment well      PLAN OF CARE:  Strengthening, Balance training, Functional mobility training, Neuromuscular re-education, Endurance training, Self-Care / ADL, Cognitive/Perceptual training, Safety education & training, Home management training, Patient/Caregiver education & training    Continue per OT POC for planned d/c on 8-17-22    Patient goals : \"to be able to wash my back and my feet. \"  Time Frame for Long term goals : 2 weeks  Long Term Goal 1: Pt within two weeks of evaluation will demonstrate progress to goals to address areas as stated below to increase functional independence for return to PLOF  Long Term Goal 2: Pt will increase independence with ADL Pt will increase independence with ADL transfers  Long Term Goal 3: Pt will increase bilateral UE strength to increase ease of ADL transfers        Therapy Time:   Individual Group Co-Treat   Time In 1400       Time Out 1430         Minutes 30                   Therapeutic activities: 30 minutes     Electronically signed by:    PRUDENCE Huynh,   8/8/2022, 2:41 PM

## 2022-08-08 NOTE — PROGRESS NOTES
Physical Therapy Rehab Treatment Note  Facility/Department: [facility]  Room: R250/R250-01       NAME: Venkata Nelson  : 1932 (80 y.o.)  MRN: [id number]  CODE STATUS: DNR-CC    Date of Service: 2022       Restrictions:  Restrictions/Precautions: Fall Risk       SUBJECTIVE:   Subjective: \"Tired. Too much. \"    Pain  Pain: Denies pre and post session pain. OBJECTIVE:         Bed mobility  Rolling to Left: Supervision  Rolling to Right: Supervision  Supine to Sit: Supervision  Sit to Supine: Supervision  Transfers  Sit to Stand: Supervision  Stand to sit: Supervision    Ambulation  WB Status: wbat  Ambulation  Surface: level tile;carpet  Device: Rolling Walker  Other Apparatus: O2  Assistance: Supervision  Quality of Gait: Assist for management of O2 line. Cues for awareness to line with pt attempting to manage with fair follow through. Gait Deviations: Slow Lea;Decreased step length  Distance: 60ft    Stairs/Curb  Stairs?: Yes  Stairs  # Steps : 4  Stairs Height: 6\"  Rails: Bilateral  Assistance: Stand by assistance        PT Exercises  A/AROM Exercises: seated LAQ x20, march x20, hip add with ball x20, hip abd RTB x20, hamstring curl RTB x20  Functional Mobility Circuit Training: STS x8  Static Standing Balance Exercises: Standing functional balance tasks in bathroom managing pants and washig hands SBA with 1 UE support or bracing self wth abdomen against sink. Dynamic Standing Balance Exercises: Ambulating around obstacles with [address] focusing on management of O2 line. Required assist 50% of time. Standing Open/Closed Kinetic Chain Exercises: standing march at [address] x10            ASSESSMENT/PROGRESS TOWARDS GOALS:   Assessment: Fatigued with functional activities. Required assist for management of O2 line.     Goals:  Long Term Goals  Long term goal 1: Pt to complete bed mobility with indep  Long term goal 2: Pt to complete transfers with indep  Long term goal 3: Pt to ambulate 50ft with LRD and indep  Long term goal 4: Pt to manage 2 steps with B HR and Supervision  Long term goal 5: Pt to complete TUG within 20sec to demonstrate improved functional mobility/balance  Patient Goals   Patient goals : does not state, family wishes pt to regain her indep    PLAN OF CARE/Safety:   Plan Comment: Cont per POC.       Therapy Time:   Individual   Time In 1430   Time Out 1530   Minutes 60     Minutes:16min made up from missed time on 8/6  Transfer/Bed mobility training:10  Gait training:10  Neuro re education:15  Therapeutic ex:15      Bj Rasheed PTA, 08/08/22 at 3:26 PM

## 2022-08-08 NOTE — CARE COORDINATION
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  [facility] rehab admit 8/3/2022    CO MORBIDITIES:      Past Medical History:   Diagnosis Date    Ascending aortic aneurysm ([address] 2017    Charcot Arleen Tooth muscular atrophy     CHF (congestive heart failure) (HCC)     Chronic diastolic CHF (congestive heart failure) ([address] 2022    Depression     Descending aortic aneurysm ([address] 2017    Essential hypertension 2018    Headache     HTN (hypertension)     Impaired mobility and activities of daily living     Lumbar stenosis with neurogenic claudication     Myelopathy ([address]     Osteoarthritis      Past Surgical History:   Procedure Laterality Date    JOINT REPLACEMENT Bilateral     knees    OTHER SURGICAL HISTORY Right 2022    cat scan guided abdominal mass aspiration with drain placement by Dr. Maria R Bell Left 2021    LEFT PLEURAL CATHETER INSERTION performed by River Real MD at [place] [id number] Left 2021    total of 755 cc removed per Dr Varghese Mode specimen sent to lab        Restrictions  Restrictions/Precautions: Fall Risk  Position Activity Restriction  Other position/activity restrictions: Abdominal drain  CASE MANAGEMENT    Social/Functional History  Social/Functional History  Lives With: Alone  Type of Home: House  Home Layout: One level  Home Access: Stairs to enter with rails  Entrance Stairs - Number of Steps: 2  Entrance Stairs - Rails: Both  Bathroom Shower/Tub: Tub/Shower unit  Bathroom Equipment: Grab bars in shower  Home Equipment: milka Pandey  Has the patient had two or more falls in the past year or any fall with injury in the past year?: (Pt reports one fall in the last year)  Receives Help From: Family  ADL Assistance: Independent  Homemaking Assistance: Independent  Homemaking Responsibilities: Yes  Meal Prep Responsibility: Primary  Laundry Responsibility: Primary  Cleaning Responsibility: Primary  Shopping Responsibility: No (Son performs)  Ambulation Assistance: Independent ([facility] and furniture walking in the home; Pt only wears AFO's when ambulating outside with QC. Inside she is always barefoot)  Transfer Assistance: Independent  Active : No  Patient's  Info: family - son lives close by; dtr lives in [place]  Additional Comments: [facility] information collected through chart review and dtr able to confirm. Pt declines teleinterpreter. Per pt's dtr, pt cannot hear the teleinterpreter and from previous experiences, the [facility] [place][note 2] interpreters are unable to speak her particular dialect. Dtr reports that she would rather have her family present to clarify instructions. This writer discussed translation concerns with nursing manager who will investigate options other than family. Pts personal preferences: n/a    Pts assets/resources/support system: son, dtr ([place])    COVERAGE INFORMATION:Payor: MEDICARE / Plan: MEDICARE PART A AND B / Product Type: *No Product type* /       NURSING  No active isolations    Weight: 164 lb (74.4 kg) / Body mass index is 32.03 kg/m². ADULT DIET;  Regular    SpO2: 93 % (08/08/22 0634)  O2 Flow Rate (L/min): 2.5 L/min (08/04/22 0751)    Oxygen to be continued upon discharge: Yes  Home-going needs (nocturnal Pox, sleep study, RX, equipment): Yes    Skin Issues: Yes; drain on right upper quadrant  Home-going needs (education, training, RX, Wound RN): Yes    Pain Managed: Yes    Bladder continence: Yes  Discharging with Taylor: No   Training done: No   Urology following: No   Plan/date to remove taylor: No   Bladder retraining started: No    Bowel continence:  Yes  Last BM date: 8/7/22  Need for bowel program: No    Anticoagulants: Lovenox and Warfarin  Home-going needs (Education, labs): Yes    Antibiotics: IV Rocephin  Stop date: TBD  Home-going needs (education, RX, PICC): Yes      Other: 1.9 INR today  Nursing to reach out to IR to discuss changing drain to YOUSIF      PHYSICAL THERAPY  Bed mobility:  Bed mobility  Bridging: Stand by assistance (08/04/22 1014)  Rolling to Left: Stand by assistance (08/05/22 1238)  Rolling to Right: Stand by assistance (08/05/22 1238)  Supine to Sit: Stand by assistance (08/05/22 1238)  Sit to Supine: Stand by assistance (08/05/22 1238)  Bed Mobility Comments: increased time and effort to complete all transitions. Hand over hand assist to complete each activity. (08/04/22 1014)  Bed Mobility  Additional Factors: Head of bed flat; With handrails;Verbal cues; Increased time to complete (08/06/22 1516)  Additional Factors: Head of bed flat; With handrails;Verbal cues; Increased time to complete (08/06/22 1516)  Roll Right  Assistance Level: Supervision (08/06/22 1516)  Supine to Sit  Assistance Level: Supervision (08/06/22 1516)  Scooting  Assistance Level: Supervision (08/06/22 1516)  Transfers:  Transfers  Sit to Stand: Supervision (08/08/22 1117)  Stand to sit: Supervision (08/08/22 1117)  Bed to Chair: Stand by assistance (08/05/22 1238)  Comment: VCs and demo for handplacement to improve safety and technique with fait follow through.  (08/05/22 1444)  Sit to Stand  Assistance Level: Supervision (08/06/22 1516)  Stand to Sit  Assistance Level: Supervision (08/06/22 1516)  Bed To/From Chair  Assistance Level: Supervision (08/06/22 1516)  Stand Pivot  Assistance Level: Contact guard assist (08/04/22 1312)  Gait:   Ambulation  WB Status: wbat (08/08/22 1445)  Ambulation  Surface: level tile;carpet (08/08/22 1445)  Device: Rolling Walker (08/08/22 1445)  Other Apparatus: O2 (08/08/22 1445)  Assistance: Supervision (08/08/22 2965)  Quality of Gait: Assist for management of O2 line.  Cues for awareness to line with pt attempting to manage with fair follow through. (08/08/22 1445)  Gait Deviations: Slow Raji;Decreased step length (08/08/22 1445)  Distance: 60ft (08/08/22 1445)  Ambulation  Surface: Carpet (08/06/22 1517)  Device: Rolling walker (08/06/22 1517)  Distance: 100' (08/06/22 1517)  Activity: Within Room (08/06/22 1517)  Activity Comments: Reciprocal pattern (08/06/22 1517)  Additional Factors: Set-up; Verbal cues (08/06/22 1517)  Assistance Level: Stand by assist (08/06/22 1517)  Gait Deviations: Slow raji;Decreased step length bilateral;Decreased heel strike right;Decreased heel strike left; Wide base of support (08/06/22 1517)  Skilled Clinical Factors: Patient with AFO's donned Improved gait up to 150' Patient completes all transfers with supervision (08/06/22 1517)  Stairs:  Stairs/Curb  Stairs?: Yes (08/08/22 1117)  Stairs  # Steps : 4 (08/08/22 1117)  Stairs Height: 6\" (08/08/22 1117)  Rails: Bilateral (08/08/22 1117)  Assistance: Stand by assistance (08/08/22 1117)  Stairs  Stair Height: 6'' (08/06/22 1517)  Device: Bilateral handrails (08/06/22 1517)  Number of Stairs: 4 (08/06/22 1517)  Additional Factors: Set-up; Verbal cues; Non-reciprocal going up;Non-reciprocal going down (08/06/22 1517)  Assistance Level: Contact guard assist (08/06/22 1517)  Skilled Clinical Factors: Pt reuiring CGA with first two steps, but then Min A with third and Mod with fourth. Pt only CGA with descending forward.  (08/04/22 1525)  W/C mobility:     LTG:  Long term goal 1: Pt to complete bed mobility with indep  Long term goal 2: Pt to complete transfers with indep  Long term goal 3: Pt to ambulate 50ft with LRD and indep  Long term goal 4: Pt to manage 2 steps with B HR and Supervision  Long term goal 5: Pt to complete TUG within 20sec to demonstrate improved functional mobility/balance  PT Treatment Time:  1.5 hrs      OCCUPATIONAL THERAPY    EVALUATION SELF CARE STATUS:  Hand Dominance: Right  Feeding: Setup (08/04/22 1114)  Grooming: Setup; Increased time to complete (08/04/22 1114)  UE Bathing: Stand by assistance; Increased time to complete (08/04/22 1114)  LE Bathing: Moderate assistance; Increased time to complete (08/04/22 1114)  UE Dressing: Contact guard assistance; Increased time to complete (08/04/22 1114)  LE Dressing: Maximum assistance; Increased time to complete (08/04/22 1114)  Toileting: Maximum assistance (08/04/22 1114)             CURRENT SELF CARE:  Grooming/Oral Hygiene  Assistance Level: Independent (08/08/22 1021)  Upper Extremity Bathing  Assistance Level: Set-up (08/08/22 1021)  Skilled Clinical Factors: Patient completed sponge bath seated in w/c at bathrooom sink (08/05/22 1348)  Lower Extremity Bathing  Assistance Level: Minimal assistance (08/08/22 1021)  Skilled Clinical Factors: B feet (08/08/22 1021)  Upper Extremity Dressing  Assistance Level: Minimal assistance (08/08/22 1021)  Skilled Clinical Factors: pull down back (08/08/22 1021)  Lower Extremity Dressing  Assistance Level: Minimal assistance (08/08/22 1021)  Skilled Clinical Factors: thread R LE x 1/2 (08/08/22 1021)  Putting On/Taking Off Footwear  Assistance Level: Dependent (08/08/22 1021)  Skilled Clinical Factors: B socks (08/08/22 1021)  Toileting  Assistance Level: Stand by assist (08/08/22 1021)  Toilet Transfers  Technique: Stand step (08/08/22 1021)  Equipment: Grab bars;Standard toilet (08/08/22 1021)  Assistance Level: Contact guard assist (08/08/22 1021)  Skilled Clinical Factors: CGA 2° frequent c/o dizziness (08/08/22 1021)  Tub/Shower Transfers  Type: Shower (08/08/22 1021)  Transfer From: Leslyndchelsea MartiLeventhal (08/08/22 1021)  Transfer To: Shower chair with back (08/08/22 1021)  Additional Factors:  With handrails (08/08/22 1021)  Assistance Level: Contact guard assist (08/08/22 1021)  Skilled Clinical Factors: CGA 2° frequent c/o dizziness (08/08/22 1021)        LTG:  Eating  Assistance Needed: Setup or clean-up assistance  CARE Score: 5  Discharge Goal: Independent, Oral Hygiene  Assistance Needed: Setup or clean-up assistance  CARE Score: 5  Discharge Goal: Independent, Toileting Hygiene  Assistance needed: Substantial/maximal assistance  CARE Score: 2  Discharge Goal: Independent, Shower/Bathe Self  Assistance Needed: Partial/moderate assistance  CARE Score: 3  Discharge Goal: Independent  Upper Body Dressing  Assistance Needed: Supervision or touching assistance  CARE Score: 4  Discharge Goal: Independent, Lower Body Dressing  Assistance Needed: Partial/moderate assistance  CARE Score: 3  Discharge Goal: Independent, Putting On/Taking Off Footwear  Assistance Needed: Dependent  CARE Score: 1  Discharge Goal: Independent, Toilet Transfer  Assistance needed: Partial/moderate assistance  CARE Score: 3  Discharge Goal: Independent  OT Treatment Time: 1.5 hrs      SPEECH THERAPY                 Diet/Swallow:                       LTG:                COGNITION  OT: Cognition Comment: Comp Sup, expression Mod I, social Ind, problem min A, Memory Sup  SP:        RECREATIONAL THERAPY  Attendance to recreational therapy programs:    []  Pet Therapy  [] Music Therapy  [] Art Therapy    [] Recreation Therapy Group [] Support Group           Patient social interaction (mood, participation): good    Patient strengths: independent prior    Patients goal: return home alone    Problems/Barriers: language barrier, lives alone, bulky drain--pt unable to manage as well as O2 tubing        1. Safety:          - Intervention / Plan:    [x]  falls protocol     [x]  PT/OT    []  SP        - Results:         2. Potential DME needs:         - Intervention / Plan:  [x]  PT/OT     [x]  Assess equipment needs/access       - Results:         3. Weakness:          - Intervention / Plan:  [x]  PT/OT      []  Other:         - Results:         4.   Discharge planning needs:          - Intervention / Plan:  [x]  Weekly team conference      [x]  family training        - Results:         5.            - Intervention / Plan:          - Results:         6.            - Intervention / Plan:         - Results:         7.            - Intervention / Plan:         - Results:           Discharge Plan   Estimated Length of Stay: 12 days    Tentative Discharge date: 8/17/22      Anticipated Discharge Destination:  Home      Team recommendations:    1. Follow up Therapy :    PT  OT  RN  [facility]    2. Home Health vs OP TBD    Other:     Equipment needed at Discharge:  Other: TBD      Team Members Present at Conference:    Physician: Dr. Connie Judge  : Astrid Elliott RN  : KIN Alves LSW  RN: Eneida Griffin RN  Physical Therapist: Sarah Silveira, LYNN  Occupational Therapist: Estrellita Hodgkin, OTR  Speech Therapist: Angelina Ponce, SLP    Electronically signed by KIN Alves LSW on 8/8/2022 at 4:46 PM

## 2022-08-08 NOTE — PLAN OF CARE
Problem: Discharge Planning  Goal: Discharge to home or other facility with appropriate resources  Outcome: Progressing  Flowsheets (Taken 8/7/2022 2010)  Discharge to home or other facility with appropriate resources: Identify barriers to discharge with patient and caregiver

## 2022-08-08 NOTE — PROGRESS NOTES
Progress Note  Patient: Ralph Stubbs  Unit/Bed: R250/R250-01  YOB: 1932  MRN: [id number]  Acct: [de-identified]   Admitting Diagnosis: Impaired mobility and activities of daily living [Z74.09, Z78.9]  Impaired mobility and ADLs [Z74.09, Z78.9]  Admit Date:  8/3/2022  Hospital Day: 5    Chief Complaint:  CAD    Histories:  Past Medical History:   Diagnosis Date    Ascending aortic aneurysm ([address] 6/23/2017    Charcot Arleen Tooth muscular atrophy     CHF (congestive heart failure) (HCC)     Chronic diastolic CHF (congestive heart failure) ([facility] 4/1/2022    Depression     Descending aortic aneurysm ([facility] 6/23/2017    Essential hypertension 2/16/2018    Headache     HTN (hypertension)     Impaired mobility and activities of daily living     Lumbar stenosis with neurogenic claudication     Myelopathy ([address]     Osteoarthritis      Past Surgical History:   Procedure Laterality Date    JOINT REPLACEMENT Bilateral     knees    OTHER SURGICAL HISTORY Right 07/21/2022    cat scan guided abdominal mass aspiration with drain placement by Dr. Mere Gutierrez Left 02/25/2021    LEFT PLEURAL CATHETER INSERTION performed by Leonor Starks MD at [place] [id number] Left 01/29/2021    total of 755 cc removed per Dr Maria Bottom specimen sent to lab     Family History   Problem Relation Age of Onset    Arthritis Mother     Arthritis Father     High Blood Pressure Father      Social History     Socioeconomic History    Marital status:       Spouse name: None    Number of children: 2    Years of education: None    Highest education level: None   Tobacco Use    Smoking status: Never    Smokeless tobacco: Never   Substance and Sexual Activity    Alcohol use: No     Alcohol/week: 0.0 standard drinks    Drug use: No   Social History Narrative    , Lives With: Alone, son lives down the street, dtr is in the area    Type of Home: [facility]  in [address]: One level    Home Access: Stairs to enter with rails- Number of Steps: 2- Rails: Both    Bathroom Shower/Tub: Tub/Shower unit, Bathroom Equipment: Grab bars in shower, Shower chair    Home Equipment: Rolling walker, Cane(Pt infrequemtly uses DME for ambulation and prefers to furniture walk in home)    ADL Assistance: [address]: Independent    Homemaking Responsibilities: Yes    Ambulation Assistance: Independent, Transfer Assistance: Independent    Additional Comments: Son stops over 2 times daily           Subjective/HPI  No new events doing well eating well no cp less dizzy w Meclizine. BP stable  overall progressing well    EKG:        Review of Systems:   Review of Systems   Constitutional: Negative. Negative for diaphoresis and fatigue. HENT: Negative. Eyes: Negative. Respiratory: Negative. Negative for cough, chest tightness, shortness of breath, wheezing and stridor. Cardiovascular: Negative. Negative for chest pain, palpitations and leg swelling. Gastrointestinal: Negative. Negative for blood in stool and nausea. Genitourinary: Negative. Musculoskeletal:  Positive for arthralgias, back pain and gait problem. Skin: Negative. Neurological:  Positive for weakness. Negative for dizziness, syncope and light-headedness. Hematological: Negative. Psychiatric/Behavioral: Negative. Physical Examination:    /88   Pulse 70   Temp 98.2 °F (36.8 °C)   Resp 18   Ht 5' (1.524 m)   Wt 164 lb (74.4 kg)   SpO2 93%   BMI 32.03 kg/m²    Physical Exam   Constitutional: No distress. She appears chronically ill. HENT:   Normal cephalic and Atraumatic   Eyes: Pupils are equal, round, and reactive to light. Neck: Thyroid normal. No JVD present. No neck adenopathy. No thyromegaly present. Cardiovascular: Normal rate, regular rhythm, intact distal pulses and normal pulses. Murmur heard. Pulmonary/Chest: Effort normal and breath sounds normal. She has no wheezes.  She has no rales. She exhibits no tenderness. Abdominal: Soft. Bowel sounds are normal. There is no abdominal tenderness. Musculoskeletal:         General: No tenderness or edema. Normal range of motion. Cervical back: Normal range of motion and neck supple. Neurological: She is alert and oriented to person, place, and time. Skin: Skin is warm. No cyanosis. Nails show no clubbing.      LABS:  CBC:   Lab Results   Component Value Date/Time    WBC 6.2 08/04/2022 03:28 AM    RBC 3.62 08/04/2022 03:28 AM    HGB 10.1 08/04/2022 03:28 AM    HCT 30.4 08/04/2022 03:28 AM    MCV 83.9 08/04/2022 03:28 AM    MCH 27.9 08/04/2022 03:28 AM    MCHC 33.3 08/04/2022 03:28 AM    RDW 15.8 08/04/2022 03:28 AM     08/04/2022 03:28 AM     CBC with Differential:    Lab Results   Component Value Date/Time    WBC 6.2 08/04/2022 03:28 AM    RBC 3.62 08/04/2022 03:28 AM    HGB 10.1 08/04/2022 03:28 AM    HCT 30.4 08/04/2022 03:28 AM     08/04/2022 03:28 AM    MCV 83.9 08/04/2022 03:28 AM    MCH 27.9 08/04/2022 03:28 AM    MCHC 33.3 08/04/2022 03:28 AM    RDW 15.8 08/04/2022 03:28 AM    BANDSPCT 2 07/24/2022 05:00 AM    METASPCT 2 07/24/2022 05:00 AM    LYMPHOPCT 7.0 07/24/2022 05:00 AM    MONOPCT 5.8 07/24/2022 05:00 AM    BASOPCT 1.0 07/24/2022 05:00 AM    MONOSABS 0.7 07/24/2022 05:00 AM    LYMPHSABS 0.8 07/24/2022 05:00 AM    EOSABS 0.0 07/24/2022 05:00 AM    BASOSABS 0.1 07/24/2022 05:00 AM     CMP:    Lab Results   Component Value Date/Time     08/04/2022 03:28 AM    K 3.9 08/04/2022 03:28 AM    K 3.2 07/24/2022 05:00 AM     08/04/2022 03:28 AM    CO2 27 08/04/2022 03:28 AM    BUN 14 08/04/2022 03:28 AM    CREATININE 0.56 08/04/2022 03:28 AM    GFRAA >60.0 08/04/2022 03:28 AM    LABGLOM >60.0 08/04/2022 03:28 AM    GLUCOSE 110 08/04/2022 03:28 AM    PROT 6.0 07/24/2022 05:00 AM    LABALBU 3.0 08/04/2022 03:28 AM    CALCIUM 8.8 08/04/2022 03:28 AM    BILITOT 0.3 07/24/2022 05:00 AM    ALKPHOS 51 07/24/2022 05:00 AM    AST 20 07/24/2022 05:00 AM    ALT 19 07/24/2022 05:00 AM     BMP:    Lab Results   Component Value Date/Time     08/04/2022 03:28 AM    K 3.9 08/04/2022 03:28 AM    K 3.2 07/24/2022 05:00 AM     08/04/2022 03:28 AM    CO2 27 08/04/2022 03:28 AM    BUN 14 08/04/2022 03:28 AM    LABALBU 3.0 08/04/2022 03:28 AM    CREATININE 0.56 08/04/2022 03:28 AM    CALCIUM 8.8 08/04/2022 03:28 AM    GFRAA >60.0 08/04/2022 03:28 AM    LABGLOM >60.0 08/04/2022 03:28 AM    GLUCOSE 110 08/04/2022 03:28 AM     Magnesium:    Lab Results   Component Value Date/Time    MG 2.1 08/03/2022 05:18 AM     Troponin:    Lab Results   Component Value Date/Time    TROPONINI <0.010 07/18/2022 12:30 PM        Active Hospital Problems    Diagnosis Date Noted    A-fib [facility]) [I48.91]      Priority: High    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 [Z74.09, Z78.9]      Priority: High    Abscess of abdominal cavity ([facility] [K65.1] 08/08/2022     Priority: Medium    Abdominal pain [R10.9] 07/27/2022     Priority: Medium    Lumbar stenosis with neurogenic claudication [M48.062] 06/02/2016     Priority: Medium    Chronic diastolic CHF (congestive heart failure) ([facility] [I50.32] 04/01/2022     Priority: Low    Acute on chronic combined systolic and diastolic CHF (congestive heart failure) ([facility] [I50.43] 01/25/2021     Priority: Low    Descending aortic aneurysm ([facility] [I71.9] 06/23/2017     Priority: Low        Assessment/Plan:  Impression/Plan:   Abd Abscess/Mass - s/p Drainage  LVEF 60  Symptoms of Orthopnea- lungs are clear no JVD nor peripheral edema. - will give Empiric low dose Lasix. Will need periodic Lab at Rehab  Frequent PAF - rate is controlled on exam.  CAD- moderate - no angina. Continue BB  HTN Stable but elevated this am. Will observe. HPL - statin on hold  Ascending AO aneurysm-  had increased in size and measures 5.3cm distal arch.  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 Xarelto stopped and started Heparin. Had long discussion with pt and daughter. They no longer want aggressive Rx and does not want to be transferred to [facility]. Conitnue Lovenox until INR therapeutic. Daily INR. Advance Warfarin dose to 7.5. INR 1.9 today.    We should be able to stop Lovenox tomorrow         Electronically signed by Lee Valladares MD on 8/8/2022 at 5:10 PM

## 2022-08-08 NOTE — PROGRESS NOTES
Pt complete assessment at 2010. Pt denies any pain or SOB. assisted pt to change into hospital gown- assisted from W/C to BR back to bed -pt with good safety caution with W/W. Call light with in reach and bedside table in reach. Again at 2300 assisted pt to BR.

## 2022-08-09 ENCOUNTER — APPOINTMENT (OUTPATIENT)
Dept: CT IMAGING | Age: 87
DRG: 073 | End: 2022-08-09
Attending: PHYSICAL MEDICINE & REHABILITATION
Payer: MEDICARE

## 2022-08-09 PROBLEM — K65.1 ABSCESS OF ABDOMINAL CAVITY (HCC): Status: ACTIVE | Noted: 2022-08-09

## 2022-08-09 LAB
INR BLD: 2
PROTHROMBIN TIME: 22.6 SEC (ref 12.3–14.9)

## 2022-08-09 PROCEDURE — 97530 THERAPEUTIC ACTIVITIES: CPT

## 2022-08-09 PROCEDURE — 97112 NEUROMUSCULAR REEDUCATION: CPT

## 2022-08-09 PROCEDURE — 74177 CT ABD & PELVIS W/CONTRAST: CPT

## 2022-08-09 PROCEDURE — 6360000004 HC RX CONTRAST MEDICATION: Performed by: INTERNAL MEDICINE

## 2022-08-09 PROCEDURE — 6370000000 HC RX 637 (ALT 250 FOR IP): Performed by: INTERNAL MEDICINE

## 2022-08-09 PROCEDURE — 2580000003 HC RX 258: Performed by: INTERNAL MEDICINE

## 2022-08-09 PROCEDURE — 99232 SBSQ HOSP IP/OBS MODERATE 35: CPT | Performed by: PHYSICAL MEDICINE & REHABILITATION

## 2022-08-09 PROCEDURE — 85610 PROTHROMBIN TIME: CPT

## 2022-08-09 PROCEDURE — 97535 SELF CARE MNGMENT TRAINING: CPT

## 2022-08-09 PROCEDURE — 99222 1ST HOSP IP/OBS MODERATE 55: CPT | Performed by: INTERNAL MEDICINE

## 2022-08-09 PROCEDURE — 6360000002 HC RX W HCPCS: Performed by: INTERNAL MEDICINE

## 2022-08-09 PROCEDURE — 97110 THERAPEUTIC EXERCISES: CPT

## 2022-08-09 PROCEDURE — 6370000000 HC RX 637 (ALT 250 FOR IP): Performed by: PHYSICAL MEDICINE & REHABILITATION

## 2022-08-09 PROCEDURE — 99232 SBSQ HOSP IP/OBS MODERATE 35: CPT | Performed by: NURSE PRACTITIONER

## 2022-08-09 PROCEDURE — 97116 GAIT TRAINING THERAPY: CPT

## 2022-08-09 PROCEDURE — 1180000000 HC REHAB R&B

## 2022-08-09 PROCEDURE — 36415 COLL VENOUS BLD VENIPUNCTURE: CPT

## 2022-08-09 PROCEDURE — 6370000000 HC RX 637 (ALT 250 FOR IP)

## 2022-08-09 RX ORDER — WARFARIN SODIUM 5 MG/1
7.5 TABLET ORAL
Status: COMPLETED | OUTPATIENT
Start: 2022-08-09 | End: 2022-08-09

## 2022-08-09 RX ADMIN — LACTOBACILLUS TAB 2 TABLET: TAB at 17:22

## 2022-08-09 RX ADMIN — WARFARIN SODIUM 7.5 MG: 5 TABLET ORAL at 17:22

## 2022-08-09 RX ADMIN — CITALOPRAM HYDROBROMIDE 20 MG: 10 TABLET ORAL at 08:05

## 2022-08-09 RX ADMIN — MEROPENEM 1000 MG: 1 INJECTION, POWDER, FOR SOLUTION INTRAVENOUS at 17:40

## 2022-08-09 RX ADMIN — Medication 100 MG: at 17:22

## 2022-08-09 RX ADMIN — PANTOPRAZOLE SODIUM 40 MG: 40 TABLET, DELAYED RELEASE ORAL at 05:34

## 2022-08-09 RX ADMIN — CARVEDILOL 6.25 MG: 6.25 TABLET, FILM COATED ORAL at 17:22

## 2022-08-09 RX ADMIN — MICONAZOLE NITRATE: 2 POWDER TOPICAL at 22:57

## 2022-08-09 RX ADMIN — LACTOBACILLUS TAB 2 TABLET: TAB at 22:34

## 2022-08-09 RX ADMIN — ENOXAPARIN SODIUM 70 MG: 100 INJECTION SUBCUTANEOUS at 08:05

## 2022-08-09 RX ADMIN — LEVOTHYROXINE SODIUM 88 MCG: 88 TABLET ORAL at 08:05

## 2022-08-09 RX ADMIN — IOPAMIDOL 50 ML: 612 INJECTION, SOLUTION INTRAVENOUS at 15:55

## 2022-08-09 RX ADMIN — Medication 10 ML: at 22:58

## 2022-08-09 RX ADMIN — MEROPENEM 1000 MG: 1 INJECTION, POWDER, FOR SOLUTION INTRAVENOUS at 22:53

## 2022-08-09 RX ADMIN — Medication 10 ML: at 08:15

## 2022-08-09 RX ADMIN — MICONAZOLE NITRATE: 2 POWDER TOPICAL at 08:05

## 2022-08-09 RX ADMIN — CARVEDILOL 6.25 MG: 6.25 TABLET, FILM COATED ORAL at 08:05

## 2022-08-09 RX ADMIN — FUROSEMIDE 20 MG: 20 TABLET ORAL at 08:05

## 2022-08-09 RX ADMIN — MECLIZINE 12.5 MG: 12.5 TABLET ORAL at 05:34

## 2022-08-09 RX ADMIN — Medication 2000 UNITS: at 17:23

## 2022-08-09 RX ADMIN — LACTOBACILLUS TAB 2 TABLET: TAB at 08:05

## 2022-08-09 ASSESSMENT — ENCOUNTER SYMPTOMS
COUGH: 0
TROUBLE SWALLOWING: 0
CHEST TIGHTNESS: 0
COLOR CHANGE: 0
SORE THROAT: 0
NAUSEA: 0
EYES NEGATIVE: 1
DIARRHEA: 0
ABDOMINAL PAIN: 0
VOMITING: 0
GASTROINTESTINAL NEGATIVE: 1
STRIDOR: 0
SHORTNESS OF BREATH: 0
WHEEZING: 0
BACK PAIN: 1
RESPIRATORY NEGATIVE: 1
BACK PAIN: 0
BLOOD IN STOOL: 0

## 2022-08-09 NOTE — PROGRESS NOTES
Subjective: The patient complains of severe acute on chronic progressive fatigue and  RLQ abdominal pain partially relieved by rest, medications, PT,  OT,     and rest and exacerbated by recent illness -she was initially admitted through the ER at [facility] complaining of abdominal pain for the past 5 to 7 days. She also complained of headache and dizziness. Imaging revealed an abdominal mass felt to be either cancer or an abscess. Imaging was thinking it was more like an abscess. She was admitted to [facility] placed on antibiotics and a drain was placed under interventional radiology's guidance. She was transferred off the rehab unit because of findings on a CT abdomen which showed aneurysm and clots however she was felt to be nonoperative given her age and complex medical status. Her family does not want to pursue aggressive treatment and she is very glad to be back on the rehabilitation unit and is medically stable and ready to participate. She still has her abdominal drain and I discussed with them that interventional radiology who had been under the impression the patient was going to the [facility] clinic for further operative care. Now that she is not we will touch base with general surgery to see if they would be comfortable with this getting it out. .      I am concerned about patients medical complexities and barriers to advancing in rehab goals including  drain for Abd abscess. .        I reviewed current care and plans for further care with other rehab providers including nursing and case management. According to recent nursing note, \"  Patient has no c/o pain or discomfort noted at this time. Ambulates with walker and CGA x1. No c/o dizziness noted. ABD dressing is clean, dry and intact. ..0500 Serosang drainage noted in the drain, <5 ml of drainage emptied. \".     Seems to be doing better now that she is getting the meclizine before breakfast.  I am hoping she is going to be coming off of her Lovenox as her INR is now therapeutic. ROS x10: The patient also complains of severely impaired mobility and activities of daily living. Otherwise no new problems with vision, hearing, nose, mouth, throat, dermal, cardiovascular, GI, , pulmonary, musculoskeletal, psychiatric or neurological. See also Acute Rehab PM&R H&P. Vital signs:  /76   Pulse 68   Temp 98.6 °F (37 °C) (Oral)   Resp 16   Ht 5' (1.524 m)   Wt 164 lb (74.4 kg)   SpO2 97%   BMI 32.03 kg/m²   I/O:   PO/Intake:  fair PO intake,   Reg diet    Bowel:   continent   Bladder: continent      retention  General:  Patient is well developed,   adequately nourished, and    well kempt. HEENT:    Pupils equal, hearing intact to loud voice, external inspection of ear and nose benign. Inspection of lips, tongue and gums benign    Musculoskeletal: No significant change in strength or tone. All joints stable. Inspection and palpation of digits and nails show no clubbing, cyanosis or inflammatory conditions. Neuro/Psychiatric: Affect: flat but pleasant. Alert and oriented to person, place and situation with  no needded cues. No significant change in deep tendon reflexes or sensation  Lungs:  Diminished, CTA-B. Respiration effort is   normal at rest.     Heart:   S1 = S2,    irreg. Abdomen:  Soft, RLQ-tender, no enlargement of liver or spleen. Extremities:    lower extremity edema    Skin:   Intact to general survey,    right lower drain site    Rehabilitation:  Physical Therapy:   Bed mobility:  Bed mobility  Bridging: Stand by assistance (08/04/22 1014)  Rolling to Left: Supervision (08/08/22 1525)  Rolling to Right: Supervision (08/08/22 1525)  Supine to Sit: Supervision (08/08/22 1525)  Sit to Supine: Supervision (08/08/22 1525)  Bed Mobility Comments: increased time and effort to complete all transitions. Hand over hand assist to complete each activity.  (08/04/22 1014)  Bed Mobility  Additional Factors: Head of bed flat; With handrails;Verbal cues; Increased time to complete (08/06/22 1516)  Additional Factors: Head of bed flat; With handrails;Verbal cues; Increased time to complete (08/06/22 1516)  Roll Right  Assistance Level: Supervision (08/06/22 1516)  Supine to Sit  Assistance Level: Supervision (08/06/22 1516)  Scooting  Assistance Level: Supervision (08/06/22 1516)  Transfers:  Transfers  Sit to Stand: Supervision (08/08/22 1117)  Stand to sit: Supervision (08/08/22 1117)  Bed to Chair: Stand by assistance (08/05/22 1238)  Comment: VCs and demo for handplacement to improve safety and technique with fait follow through. (08/05/22 1444)  Sit to Stand  Assistance Level: Supervision (08/06/22 1516)  Stand to Sit  Assistance Level: Supervision (08/06/22 1516)  Bed To/From Chair  Assistance Level: Supervision (08/06/22 1516)  Stand Pivot  Assistance Level: Contact guard assist (08/04/22 1312)  Gait:   Ambulation  WB Status: wbat (08/08/22 1445)  Ambulation  Surface: level tile;carpet (08/08/22 1445)  Device: Rolling Walker (08/08/22 1445)  Other Apparatus: O2 (08/08/22 1445)  Assistance: Supervision (08/08/22 1445)  Quality of Gait: Assist for management of O2 line. Cues for awareness to line with pt attempting to manage with fair follow through. (08/08/22 1445)  Gait Deviations: Slow Raji;Decreased step length (08/08/22 1445)  Distance: 60ft (08/08/22 1445)  Ambulation  Surface: Carpet (08/06/22 1517)  Device: Rolling walker (08/06/22 1517)  Distance: 100' (08/06/22 1517)  Activity: Within Room (08/06/22 1517)  Activity Comments: Reciprocal pattern (08/06/22 1517)  Additional Factors: Set-up; Verbal cues (08/06/22 1517)  Assistance Level: Stand by assist (08/06/22 1517)  Gait Deviations: Slow raji;Decreased step length bilateral;Decreased heel strike right;Decreased heel strike left; Wide base of support (08/06/22 1517)  Skilled Clinical Factors: Patient with AFO's donned Improved gait up to 150' Patient completes all transfers with supervision (08/06/22 1517)  Stairs:  Stairs/Curb  Stairs?: Yes (08/08/22 1117)  Stairs  # Steps : 4 (08/08/22 1117)  Stairs Height: 6\" (08/08/22 1117)  Rails: Bilateral (08/08/22 1117)  Assistance: Stand by assistance (08/08/22 1117)  Stairs  Stair Height: 6'' (08/06/22 1517)  Device: Bilateral handrails (08/06/22 1517)  Number of Stairs: 4 (08/06/22 1517)  Additional Factors: Set-up; Verbal cues; Non-reciprocal going up;Non-reciprocal going down (08/06/22 1517)  Assistance Level: Contact guard assist (08/06/22 1517)  Skilled Clinical Factors: Pt reuiring CGA with first two steps, but then Min A with third and Mod with fourth. Pt only CGA with descending forward. (08/04/22 1525)  W/C mobility:       Occupational Therapy:   Hand Dominance: Right  ADL  Feeding: Setup (08/04/22 1114)  Grooming: Setup; Increased time to complete (08/04/22 1114)  UE Bathing: Stand by assistance; Increased time to complete (08/04/22 1114)  LE Bathing: Moderate assistance; Increased time to complete (08/04/22 1114)  UE Dressing: Contact guard assistance; Increased time to complete (08/04/22 1114)  LE Dressing: Maximum assistance; Increased time to complete (08/04/22 1114)  Toileting: Maximum assistance (08/04/22 1114)  Toilet Transfers  Toilet - Technique: Stand step (08/04/22 1115)  Equipment Used: Grab bars (08/04/22 1115)  Toilet Transfer: Minimal assistance (08/04/22 1115)  Tub Transfers  Tub Transfers: Not tested (08/04/22 1115)  Shower Transfers  Shower - Transfer From: Wheelchair (08/04/22 1115)  Shower - Transfer Type: To and From (08/04/22 1115)  Shower - Transfer To:  Shower seat with back (08/04/22 1115)  Shower - Technique: Stand pivot (08/04/22 1115)  Shower Transfers: Minimal assistance (08/04/22 1115)    Speech Therapy:            Diet/Swallow:                      Lab/X-ray studies reviewed, analyzed and discussed with patient and staff:   Recent Results (from the past 24 hour(s)) Protime-INR    Collection Time: 08/09/22  4:26 AM   Result Value Ref Range    Protime 22.6 (H) 12.3 - 14.9 sec    INR 2.0        CT ABDOMEN PELVIS  7/29/2022  1. The previously placed abscess drain in the right lower quadrant appears to be in the periphery of the abscess with loculations now more apparent in the fluid collection. The fluid collection appears to be grossly the same size as prior to drainage. 2.New mural thrombus is noted in the distal aortic arch extending to the lower thoracic aorta which was not seen on prior study. 3.Note is again made of an aortic aneurysm involving the ascending aorta, aortic arch, and descending aorta. Based on current images, the ascending aorta appears to be 2 mm larger when compared to study dating February 17, 2021. The ascending aorta now measures 5.5 cm. Previously the arch measured 5.3 cm. 4.Note is again made of left lower small pleural effusion with bilateral basilar opacities which may represent atelectasis. 5.Note is again made of a large cystic mass in the pelvis. DIAGNOSIS: Previous cystic lesion adjacent to the ascending colon status post drain placement      Bibasilar atelectasis with left small pleural effusion. Underlying nodules cannot be excluded. The heart is enlarged. The liver is of normal size and attenuation without focal lesions. The spleen is of normal size and attenuation without focal lesions. Pancreas shows no signs of focal mass or peripancreatic fluid collection. Kidneys are normal in size. There is no hydronephrosis. No renal mass is identified. Adrenal glands are unremarkable. Note is again made of a thoracoabdominal aortic aneurysm. The ascending aorta now measures 5.5 cm, this is larger when compared to prior study dating 5.3 cm. 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 This large thrombus was not seen on prior study. Multilevel disc space narrowing lumbar spine. No post operative changes. Complex 10.0 x 8.0 x 10.6 cm mass with septated central low attenuation fluid collections as discussed. Mass displaces cecum and ascending colon anteriorly. Differential includes malignancy including appendiceal mucocele/carcinoma, as well as retroperitoneal sarcoma with central necrotic change. Infection/abscess secondary consideration, minimal presence of adjacent inflammatory change. 8.0 x 5.8 x 7.5 cm left adnexal cysts as discussed, most likely ovarian in etiology. If clinical concern warrants, pelvic sonography may be obtained for further evaluation. Other findings discussed. CT Head   7/18/2022  1. Age-appropriate cortical atrophy and periventricular microangiopathy. When compared to previous study there has been no significant interval change. 2. No acute hemorrhage or extra-axial fluid collection            CT ABDOMEN PELVIS   7/18/2022     1. In the right lower quadrant of the abdomen, pericolic abscesses versus pericolic mass is seen. This is thought more likely abscess. 2. A cystic mass is again seen in the pelvis and is likely in the ovary. It is unchanged from previous examination The patient will be given oral contrast and rescanned to evaluate the right lower quadrant. Previous extensive, complex labs, notes and diagnostics reviewed and analyzed. ALLERGIES:    Allergies as of 08/03/2022 - Fully Reviewed 08/03/2022   Allergen Reaction Noted    Latex  07/19/2017    Tape [adhesive tape]  06/28/2016      (please also verify by checking MAR)      Recently, I evaluated this patient for periodic reassessment of medical and functional status. The patient was discussed in detail at the treatment team meeting focusing on current medical issues, progress in therapies, social issues, psychological issues, barriers to progress and strategies to address these barriers, and discharge planning.   See the hand written addendum to rehab progress note. The patient continues to be high risk for future disability and their medical and rehabilitation prognosis continue to be good and therefore, we will continue the patient's rehabilitation course as planned. The patient's tentative discharge date was set. Patient and family education was discussed. The patient was made aware of the team discussion regarding their progress. Discharge plans were discussed along with barriers to progress and strategies to address these barriers, patient encouraged to continue to discuss discharge plans with . 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 Functional and medical status reassessed regarding patients ability to participate in therapies and patient found to be able to participate in acute intensive comprehensive inpatient rehabilitation program including PT/OT to improve balance, ambulation, ADLs, and to improve the P/AROM. Therapeutic modifications regarding activities in therapies, place, amount of time per day and intensity of therapy made daily. In bed therapies or bedside therapies prn. Bowel and Bladder dysfunction nocturia with severe urinary retention, Neurogenic bowel and bladder:  frequent toileting, ambulate to bathroom with assistance, check post void residuals. Check for C.difficile x1 if >2 loose stools in 24 hours, continue bowel & bladder program.  Monitor bowel and bladder function. Lactinex 2 PO every AC. MOM prn, Brown Bomb prn, Glycerin suppository prn, enema prn. Encourage therapy and nursing to co-treat and problem solve re continence. Recent UA was negative.   Severe right Abd  pain as well as generalized OA pain: reassess pain every shift and prior to and after each therapy session, give prn Tylenol and consider scheduled Tylenol, modalities prn in therapy, masage, Lidoderm, K-pad prn. Consider scheduled AM pain meds. Skin healing right abd and breakdown risk:  continue pressure relief program.  Daily skin exams and reports from nursing. Fatigue due to nutritional and hydration deficiency: Add and titrate vitamin B12 vitamin D and CoQ10 continue to monitor I&Os, calorie counts prn, dietary consult prn. Add healthy snack at night. Acute episodic insomnia with situational adjustment disorder:  prn Ambien, monitor for day time sedation. Falls risk elevated:  patient to use call light to get nursing assistance to get up, bed and chair alarm. Elevated DVT risk: progressive activities in PT, continue prophylaxis PORTILLO hose, elevation and  Lovenox to Coumadin . Complex discharge planning: Discharge August 17, 2022 to home alone with help from her son with home health care and video monitor. We are suggesting that patient should not be home alone for more than a few minutes. Apparently patient and family do not have that option. We are hoping that patient can go stay with one of her children though she is quite against the idea. He is competent to make her own decisions. Weekly team meeting every Monday to re-assess progress towards goals, discuss and address social, psychological and medical comorbidities and to address difficulties they may be having progressing in therapy. Patient and family education is in progress. The patient is to follow-up with their family physician after discharge. Complex Active General Medical Issues that complicate care Assess & Plan:     A-fib,   Essential hypertension, Acute on chronic combined systolic (congestive) and diastolic (congestive) heart failure-Acute rehab to monitor heart rate and rhythm with the option of telemetry and the effects of chronotropic medication with respect to increasing physical activity and exercise in PT, OT, ADLs with medication titration to lowest effective dosing.   Continue blood signs every shift focusing on heart rate, rhythm and blood pressure checks with orthostatic checks-monitoring the effect of exercise, therapy and posture. Consult hospitalist for backup medical and adjust/add medications (Lovenox transitioning to Coumadin patient had been on Xarelto, Coreg). Monitor heart rate and blood pressure as well as medications effects on vital signs before during and after therapy with especial focus on preventing orthostasis and falls risk. Hyponatremia-recheck BMP avoid excessive water  Anemia-Monitor vital signs monitor for orthostasis and tachycardia, check H&H prn. Vitamin B12 and iron-dose iron with food to prevent GI upset. Monitor for constipation. Monitor stools for blood. Hypothyroid-  Synthroid and titrate dosing. Follow vital signs, recheck TSH and thyroid studies as outpatient. 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 Osteoarthritis of lumbar spine with myelopathy-her extremity strengthening    Depression-emotional support provided daily, vitamin B12, encourage participation in rehabilitation support group and recreational therapy, adjust/add medications (B12, Celexa)  Abdominal abscess versus cancer-drain still in place pending cytology-patient is on IV Zosyn pending cultures-Pt has right abd abscess/mass. S/p drainage. SHe also has enlarging Thoraci Aortic   Aneurysm with large mural thrombus burden. Per GI surgery Dr. Kymberly Omalley he would like to keep the drain is long as possible. Do not remove the drain. GERD-Elevate head of bed after meals, monitor stools for blood, lowest effective dose of PPI, consider Tums.   Yeast rash and immunosuppression-Micatin powder and Floranex   AA now at 5.5cm up from 5.3cm but now there is a new large mural thrombus involving the descending portion of the aortic arch and extending how to the lower thoracic aorta along with large cystic mass. The thrombus was not seen on previous study. Patient had been on xarelto and now on Lovenox transitioning to Coumadin. . Given her abdominal pathology and large thrombus however patient due to her advanced age and high risk for any surgical intervention she is electing for conservative care. Dizziness and gait ataxia-She is getting dizzy especially if he eats she does not get her Antivert in the morning and therapy starts before she can get the Antivert therefore I have asked my shift to give and schedule basis before breakfast and before shift change. Focus on coordinating dc plans with patient family and care givers and order necessary equipment.         Electronically signed by Juan Collins DO on 8/5/22 at 4:51 AM KIRA Finley D.O., PM&R     Attending    [address]

## 2022-08-09 NOTE — CONSULTS
Infectious Disease     Patient Name: Will Dobson  Date: 8/9/2022  YOB: 1932  Medical Record Number: [id number]      Abdominal abscess        History of Present Illness:  Atrial fibrillation hypertension pleural effusion congestive heart failure ascending and descending aortic aneurysms      Patient presented 7/22 abdominal pain CT scan showing mass abscess in right lower quadrant had drainage catheter placed at that time was initially on Zosyn and switched to Rocephin  Abscess first found on CT scan 7/18/2022 and drainage of abscess performed on 7/21/2022    Catheter stopped working but not removed she was reevaluated by interventional radiology on 72/9 new catheter was not placed  CT scan showed enlarging aortic aneurysm with thrombus she was placed on a heparin drip which precluded the placement of the catheter    There was a plan to transfer patient to tertiary care but this did not happen he was transferred to rehab        CT of the Abdomen and Pelvis without intravenous contrast medium       History:  Approximate 1 week history of abdominal pain       Technical Factors:       CT imaging of the abdomen and pelvis were obtained and formatted as 5 mm contiguous axial images from the domes of the diaphragm to the symphysis pubis. Sagittal and coronal reconstructions were also obtained. Oral contrast medium: Barium sulfate, 450 mL. Intravenous contrast medium:  None. Comparison:  CT abdomen pelvis, July 18, 2022, 1444 hours, February 8, 2021. Findings:       Residual intravenous contrast medium from recent CT examination identified. Lungs:  Lung bases clear. Cardiac size enlarged, unchanged. Calcification at level of mitral valve. Small hiatal hernia. Liver:  Normal in size, shape, and attenuation. Bile Ducts:  Normal in caliber. Gallbladder:  No stones or wall thickening.         Pancreas:  Normal without masses, cysts, ductal dilatation or calcification. Spleen:  Normal in size without masses or calcifications. No splenules. Kidneys:  Normal in size. No hydronephrosis, masses, or stones. Adrenals:  Normal.        Small bowel:  Normal in caliber. See \"peritoneum \". Appendix:  Not visualized. Colon:  Normal in caliber. See \"peritoneum \". Peritoneum:  No free air. A bilobed, 10.0 x 8.0 x 10.6 cm mass having central rounded septated areas of low attenuation, 3.7 x 3.5 x 5.4 cm, and displacing cecum and ascending colon anteriorly (series 2, image 50, series 601, image 54, series 602, image    36). No calcification identified. Trace adjacent fat stranding demonstrated. Vessels: Aorta normal in course and caliber. Lymph nodes:         Retroperitoneal:  No enlarged retroperitoneal lymph nodes. Mesenteric:  No enlarged mesenteric lymph nodes. Pelvic: No enlarged pelvic lymph nodes. Ureters: Normal in course and caliber. No calcifications. Bladder: No wall thickening. Reproductive organs: 8.0 x 5.8 x 7.5 cm left adnexal cyst displacing urinary bladder right and laterally, anteriorly, and inferiorly (series 2, image 68, series 601, image 42, series 602, image 52). Abdominal Wall: Fat identified bilateral inguinal canals. Bones:  No bone lesions. Multilevel disc space narrowing lumbar spine. No post operative changes. Impression       Complex 10.0 x 8.0 x 10.6 cm mass with septated central low attenuation fluid collections as discussed. Mass displaces cecum and ascending colon anteriorly. Differential includes malignancy including appendiceal mucocele/carcinoma, as well as    retroperitoneal sarcoma with central necrotic change. Infection/abscess secondary consideration, minimal presence of adjacent inflammatory change. 8.0 x 5.8 x 7.5 cm left adnexal cysts as discussed, most likely ovarian in etiology.  If clinical concern warrants, pelvic sonography may be obtained for further evaluation. Other findings discussed. 1.Successful drainage and drain placement of right colonic soft tissue mass with central fluid collection. 2.Fluid was aspirated and sent for microbiology and cytology analysis. Additionally a 10 Cymraes drain was placed yielding a thick red to yellow fluid with internal yellow debris. HISTORY: Esperanza Macario is a Female of 80 years age. DIAGNOSIS:   Right abdominal mass with possible fluid collection vs necrotic tissue        COMPARISON: None available. CT Dose-Length Product (estimate related to radiation exposure from this exam):  541.6  mGy*cm. PROCEDURE:   Following the discussion of the procedure, alternatives, risks versus benefits, informed consent was obtained from the patient. Specifically, risks of after-biopsy pain at the site, rare possibility of excessive hemorrhage, infection, injury to the    adjacent organs were discussed and the patient verbalized understanding. Pre-procedure evaluation confirmed that the patient was an appropriate candidate for conscious    sedation. Adequate sedation was maintained during the entire procedure. Vital signs, pulse    oximetry, and response to verbal commands were monitored and recorded by the nurse throughout the    procedure and the recovery period. Medical information was entered in the medical record including the    medications and dosages used. The patient returned to baseline neurologic and physiologic status    prior to leaving the department. No immediate sedation related complications were noted. Medication    for conscious sedation was administered via IV route. 45 minutes of conscious sedation was    provided. Following universal protocol, patient and site verification was performed with a \"timeout\" prior to the procedure.         The patient was placed on the CT table in supine  position and the right lateral abdomen area was prepped and draped in usual sterile fashion. Using the usual sterile conditions, lidocaine and CT guidance, the fluid collection within soft tissue density    was accessed using a Yueh needle. After confirmation of appropriate localization of the needle, aspiration yielded a thick red to yellow fluid which was sent for microbiology and cytology analysis. Following that an Amplatz wire was placed through the    Yueh sheath into the abscess under CT guidance. The tract was serially dilated with 6, 8, and 10 [place] dilators respectively. Following that a 10 Chinese drainage catheter was advanced over the wire into the abscess under CT guidance and the anchoring    loop was formed. Catheter was sutured to the skin and and secured with Percufix device. The patient tolerated the procedure well. No immediate complications identified. The patient left the CT suite in supine position to the recovery room in stable    condition. Total local anesthetic used: lidocaine, approximately 15 mL. Estimated blood loss:  None. 1.The previously placed abscess drain in the right lower quadrant appears to be in the periphery of the abscess with loculations now more apparent in the fluid collection. The fluid collection appears to be grossly the same size as prior to drainage. 2.New mural thrombus is noted in the distal aortic arch extending to the lower thoracic aorta which was not seen on prior study. Findings discussed with Dr. Christopher Camarena   3. Note is again made of an aortic aneurysm involving the ascending aorta, aortic arch, and descending aorta. Based on current images, the ascending aorta appears to be 2 mm larger when compared to study dating February 17, 2021. The ascending aorta now    measures 5.5 cm. Previously the arch measured 5.3 cm. 4.Note is again made of left lower small pleural effusion with bilateral basilar opacities which may represent atelectasis.    5.Note is again made of a large cystic mass in the pelvis. 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        CT Dose-Length Product (estimate related to radiation exposure from this exam):  1386.22  mGy*cm. CT ABDOMEN:        Bibasilar atelectasis with left small pleural effusion. Underlying nodules cannot be excluded. The heart is enlarged. The liver is of normal size and attenuation without focal lesions. The spleen is of normal size and attenuation without focal lesions. Pancreas shows no signs of focal mass or peripancreatic fluid collection. Kidneys are normal in size. There is no    hydronephrosis. No renal mass is identified. Adrenal glands are unremarkable. Note is again made of a thoracoabdominal aortic aneurysm. The ascending aorta now measures 5.5 cm, this is larger when compared to prior study dating 5.3 cm. Note is now made    of a new large mural thrombus involving the descending portion of the aortic arch and extending down to the lower thoracic aorta. This large thrombus was not seen on prior study. No significant retroperitoneal adenopathy or ascites is identified. Again    seen is a cystic lesion involving the proximal ascending aorta which now appears to be more septated than prior. The previously placed drain is now in the periphery of the lesion. CT PELVIS:    A large cystic mass is again seen in the lower pelvis. Review of Systems   Constitutional: Negative. HENT: Negative. Eyes: Negative. Respiratory: Negative. Cardiovascular: Negative. Gastrointestinal: Negative. Endocrine: Negative. Genitourinary: Negative. Musculoskeletal: Negative. Skin: Negative. Neurological: Negative. Psychiatric/Behavioral: Negative. Review of Systems: All 14 review of systems negative other than as stated above    Social History     Tobacco Use    Smoking status: Never    Smokeless tobacco: Never   Substance Use Topics    Alcohol use: No     Alcohol/week: 0.0 standard drinks    Drug use: No         Past Medical History:   Diagnosis Date    [facility] 6/23/2017    Charcot Arleen Tooth muscular atrophy     CHF (congestive heart failure) ([facility])     Chronic diastolic CHF (congestive heart failure) ([facility] 4/1/2022    Depression     Descending aortic aneurysm ([facility] 6/23/2017    Essential hypertension 2/16/2018    Headache     HTN (hypertension)     Impaired mobility and activities of daily living     Lumbar stenosis with neurogenic claudication     Myelopathy ([facility]     Osteoarthritis            Past Surgical History:   Procedure Laterality Date    JOINT REPLACEMENT Bilateral     knees    OTHER SURGICAL HISTORY Right 07/21/2022    cat scan guided abdominal mass aspiration with drain placement by Dr. Lila Venegas Left 02/25/2021    LEFT PLEURAL CATHETER INSERTION performed by Ben Gray MD at [place] [id number] Left 01/29/2021    total of 755 cc removed per Dr Suhail Lovell specimen sent to lab         No current facility-administered medications on file prior to encounter.      Current Outpatient Medications on File Prior to Encounter   Medication Sig Dispense Refill    meclizine (ANTIVERT) 12.5 MG tablet Take 12.5 mg by mouth 4 times daily as needed      potassium chloride (KLOR-CON M) 20 MEQ extended release tablet Take 20 mEq by mouth daily (with breakfast)      digoxin (LANOXIN) 125 MCG tablet TAKE 1 TABLET DAILY 90 tablet 3    furosemide (LASIX) 20 MG tablet TAKE 1 TABLET DAILY 90 tablet 3    amLODIPine (NORVASC) 5 MG tablet Take 1 tablet by mouth daily 90 tablet 3    pantoprazole (PROTONIX) 40 MG tablet TAKE 1 TABLET DAILY 90 tablet 3    carvedilol (COREG) 6.25 MG tablet TAKE 1 TABLET TWICE A  tablet 3    XARELTO 15 MG TABS tablet TAKE 1 TABLET DAILY 90 tablet 3    nitroGLYCERIN (NITROSTAT) 0.4 MG SL tablet up to max of 3 total doses. If no relief after 1 dose, call 911. 25 tablet 3    budesonide-formoterol (SYMBICORT) 160-4.5 MCG/ACT AERO Inhale 2 puffs into the lungs 2 times daily 1 Inhaler 3    ferrous sulfate (IRON 325) 325 (65 Fe) MG tablet Take 1 tablet by mouth 2 times daily (with meals) 30 tablet 3    Carboxymethylcellulose Sodium (EYE DROPS OP) Apply 1 drop to eye as needed (Dry eyes) Indications: Systane       Boswellia-Glucosamine-Vit D (OSTEO BI-FLEX ONE PER DAY) TABS Take by mouth daily      Multiple Vitamins-Minerals (CENTRUM SILVER ULTRA WOMENS) TABS Take by mouth daily      vitamin B-12 (CYANOCOBALAMIN) 1000 MCG tablet Take 1,000 mcg by mouth daily      aspirin 81 MG tablet Take 81 mg by mouth daily       citalopram (CELEXA) 20 MG tablet Take 20 mg by mouth daily       SYNTHROID 88 MCG tablet Take 88 mcg by mouth daily          Allergies   Allergen Reactions    Latex     Tape [place] Cecilia Tape]          Family History   Problem Relation Age of Onset    Arthritis Mother     Arthritis Father     High Blood Pressure Father          Physical Exam:      Physical Exam  Constitutional:       Appearance: She is obese. She is not ill-appearing. HENT:      Head: Normocephalic and atraumatic. Mouth/Throat:      Mouth: Mucous membranes are moist.   Eyes:      Extraocular Movements: Extraocular movements intact. Conjunctiva/sclera: Conjunctivae normal.      Pupils: Pupils are equal, round, and reactive to light. Neck:      Vascular: No carotid bruit. Cardiovascular:      Heart sounds: Normal heart sounds. No murmur heard. Pulmonary:      Effort: Pulmonary effort is normal. No respiratory distress. Breath sounds: Normal breath sounds. No stridor.  No wheezing, rhonchi or rales.   Chest:      Chest wall: No tenderness. Abdominal:      General: Abdomen is flat. Bowel sounds are normal. There is no distension. Palpations: Abdomen is soft. There is no mass. Tenderness: There is no abdominal tenderness. There is no right CVA tenderness, left CVA tenderness, guarding or rebound. Hernia: No hernia is present. Musculoskeletal:         General: No swelling, tenderness, deformity or signs of injury. Normal range of motion. Cervical back: Normal range of motion and neck supple. No rigidity or tenderness. Right lower leg: No edema. Left lower leg: No edema. Lymphadenopathy:      Cervical: No cervical adenopathy. Skin:     General: Skin is warm. Coloration: Skin is not jaundiced or pale. Findings: No bruising, erythema, lesion or rash. Neurological:      General: No focal deficit present. Blood pressure (!) 149/79, pulse 59, temperature 98.1 °F (36.7 °C), temperature source Oral, resp. rate 13, height 5' (1.524 m), weight 164 lb (74.4 kg), SpO2 98 %.       .   Lab Results   Component Value Date    WBC 6.2 08/04/2022    HGB 10.1 (L) 08/04/2022    HCT 30.4 (L) 08/04/2022    MCV 83.9 08/04/2022     08/04/2022     Lab Results   Component Value Date/Time     08/04/2022 03:28 AM    K 3.9 08/04/2022 03:28 AM    K 3.2 07/24/2022 05:00 AM     08/04/2022 03:28 AM    CO2 27 08/04/2022 03:28 AM    BUN 14 08/04/2022 03:28 AM    CREATININE 0.56 08/04/2022 03:28 AM    GLUCOSE 110 08/04/2022 03:28 AM    CALCIUM 8.8 08/04/2022 03:28 AM                ASSESSMENT:  Patient Active Problem List   Diagnosis    Lumbar stenosis with neurogenic claudication    Acquired scoliosis    Osteoporosis    Charcot [place] Tooth muscular atrophy    Excess weight    Osteoarthritis of lumbar spine with myelopathy    Osteoarthritis    Myelopathy (HCC)    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    Headache    Depression    Thoracic aortic aneurysm without rupture (HCC)    Descending aortic aneurysm (HCC)    Dizziness    Shortness of breath    Essential hypertension    Acute on chronic combined systolic and diastolic CHF (congestive heart failure) (HCC)    Gait abnormality    A-fib (HCC)    Pleural effusion    Hypoxia    Acute pulmonary edema (HCC)    Acute on chronic combined systolic (congestive) and diastolic (congestive) heart failure (HCC)    Acute cystitis with hematuria    Chronic diastolic CHF (congestive heart failure) (HCC)    Right lower quadrant abdominal pain    Hyponatremia    Hypokalemia    Anemia    CHF (congestive heart failure) (HCC)    Hypothyroid    Abdominal mass    Central retinal vein occlusion, left eye, stable    Hx of drainage of abscess    Abdominal pain    Streptococcus viridans infection    Aortic thrombus (HCC)    Abscess    Abscess of abdominal cavity (HCC)         PLAN:  Intra-abdominal abscess    Discontinue Rocephin which has no anaerobic coverage Place patient on meropenem    Repeat CT scan of abdomen to assess abscess  Examination: CT ABDOMEN PELVIS W IV CONTRAST       Indication:   Abdominal abscess , follow-up       Technique: Multiple serial axial images was performed through the abdomen and pelvis utilizing 50 cc of Isovue 300. Images were reconstructed in the axial and coronal and sagittal planes. Comparison: July 29, 2022 1002 hours. Findings:       Within the field-of-view of the basilar again note is made of aneurysmal dilatation of the visualized portion of descending thoracic aorta. Unchanged. There is bibasilar areas of atelectasis, scarring left greater than right. Small left pleural    effusion. The liver, spleen, pancreas, adrenals, kidneys are unremarkable. The gallbladder is contracted. Large and small bowel show no sign of obstruction.    Subjacent to the proximal portion of the ascending colon/cecum is again identified a thick walled heterogeneous enhancing soft tissue collection with scattered air. A drainage catheter is seen dependently within the collection. Collection measures    approximately 6.3 x 4.2 x 5.4. There is some peripheral enhancement and findings are that of a abscess with drainage catheter. The appendix is not visualized. .  No diverticulitis. Small to moderate hiatal hernia. Again note is made of a left cystic adnexal mass measuring 6.6 x 8.1 x 7.7 in the transverse AP and cephalocaudad dimension. Small bilateral renal hernias containing mesenteric fat       No free air. Trace amount of free fluid in the right paracolic gutter and subjacent to the ascending colon. The visualized abdominal aorta is of normal size and caliber. No significant retroperitoneal adenopathy. There is multilevel degenerative changes bridging arthritis of the visualized thoracolumbar spine. There is a grade 1 anterolisthesis of L4 and L5. Loss of height of T7-T8 again noted. Likely old compression fractures           Impression   1. THERE IS A THICK WALLED PERIPHERALLY ENHANCING complex SOFT TISSUE COLLECTION WITH AIR AS WELL AS A DRAINAGE CATHETER SUBJACENT TO THE PROXIMAL PORTION OF THE ASCENDING COLON, CECUM. LIKELY THAT OF THE ABSCESS. IT HAS SHOWN SOME INTERVAL DECREASE IN    SIZE as COMPARED TO THE PRIOR EXAMINATION. 2. LARGE CYSTIC ADNEXAL MASS. UNCHANGED. THE GALLBLADDER NEOPLASM IS NOT EXCLUDED. CONTINUED FURTHER EVALUATION    3. SMALL LEFT PLEURAL EFFUSION.         OTHER FINDINGS DETAILED ABOVE       Abscess persists but apparently somewhat better  Still appears to need to be drained  Changed to meropenem should improve treatment however without drainage extended long-term course of antibiotics with serial follow-up CT scans would be necessary to determine duration of therapy

## 2022-08-09 NOTE — PROGRESS NOTES
Physical Therapy Rehab Treatment Note  Facility/Department[facility]  [facility]       NAME: Mert Coleman  : 1932 (80 y.o.)  MRN: [id number]  CODE STATUS: DNR-CC    Date of Service: 2022       Restrictions:  Restrictions/Precautions: Fall Risk       SUBJECTIVE:   Subjective: \"good. No pain. \"    Pain  Pain: Denies pre and post session pain. OBJECTIVE:            Transfers  Sit to Stand: Supervision  Stand to sit: Supervision    Ambulation  WB Status: wbat  Ambulation  Surface: level tile;carpet  Device: Rolling Walker  Other Apparatus: O2  Assistance: Supervision  Gait Deviations: Slow Lea;Decreased step length  Distance: 60ftx2  Comments: Cues and assist for O2 line management. PT Exercises  A/AROM Exercises: seated LAQ x20, march x20, hip add with ball x20, hip abd RTB x20, hamstring curl RTB x20            ASSESSMENT/PROGRESS TOWARDS GOALS:   Assessment:Pt fatigued after session. Goals:  Long Term Goals  Long term goal 1: Pt to complete bed mobility with indep  Long term goal 2: Pt to complete transfers with indep  Long term goal 3: Pt to ambulate 50ft with LRD and indep  Long term goal 4: Pt to manage 2 steps with B HR and Supervision  Long term goal 5: Pt to complete TUG within 20sec to demonstrate improved functional mobility/balance  Patient Goals   Patient goals : does not state, family wishes pt to regain her indep    PLAN OF CARE/Safety:   Plan Comment: Cont per POC.       Therapy Time:   Individual   Time In 1500   Time Out 1530   Minutes 30     Minutes:  Transfer/Bed mobility trainin  Gait training:10  Neuro re education:0  Therapeutic ex:15      Uyen Golden PTA, 22 at 3:31 PM

## 2022-08-09 NOTE — PROGRESS NOTES
OCCUPATIONAL THERAPY  [facility] REHAB TREATMENT NOTE  [facility]      [facility]  : 1932 (80 y.o.)  MRN: [id number]  CODE STATUS: DNR-CC  Room: R250/R250-01    Date of Service: 2022    Referring Physician: Dr. Jordy Moyer Diagnosis: Impaired mobility and ADL's due to Charcot [place] Tooth Neuropathy    Restrictions  Restrictions/Precautions  Restrictions/Precautions: Fall Risk     Position Activity Restriction  Other position/activity restrictions: Abdominal drain    Patient's date of birth confirmed: Yes    SAFETY:  Safety Devices  Safety Devices in place: Yes  Type of devices: All fall risk precautions in place    SUBJECTIVE:    Pain at start of treatment: No    Pain at end of treatment: No      OBJECTIVE:    UE Strengthening   Rings on Vertical Rods: Pt used alternating hands to don/doff rubber rings onto their corresponding vertical graded shana. Pt had Min/Mod difficulty with activity and worked at a slow and steady pace with rest breaks prn. Repetitive reaching at and above shoulder level to increase BUE strength and endurance for improved transfers. Problem solving incorporated into activity. ASSESSMENT: Pt able to appropriately tolerate entire session, however, demonstrates signs and symptoms of fatigue by end of session. Activity Tolerance: Patient tolerated treatment well      PLAN OF CARE:  Strengthening, Balance training, Functional mobility training, Neuromuscular re-education, Endurance training, Self-Care / ADL, Cognitive/Perceptual training, Safety education & training, Home management training, Patient/Caregiver education & training  Continue per OT POC for planned d/c on 22    Patient goals : \"to be able to wash my back and my feet. \"  Time Frame for Long term goals : 2 weeks  Long Term Goal 1: Pt within two weeks of evaluation will demonstrate progress to goals to address areas as stated below to increase functional independence for return to PLOF  Long Term Goal 2: Pt will increase independence with ADL Pt will increase independence with ADL transfers  Long Term Goal 3: Pt will increase bilateral UE strength to increase ease of ADL transfers    Therapy Time:   Individual Group Co-Treat   Time In 1430       Time Out 1500         Minutes 30       Pt seen to make up missed minutes from 08-06-22 (+30)  Therapeutic activities: 30 minutes     Electronically signed by PRUDENCE Crowder on 8/9/2022 at 3:49 PM

## 2022-08-09 NOTE — PROGRESS NOTES
CARE/Safety:   Plan Comment: Cont per POC.     Therapy Time:   Individual   Time In 1100   Time Out 1200   Minutes 60     Minutes:  Transfer/Bed mobility training:10  Gait trainin  Neuro re education:10  Therapeutic ex:15      Jag Everett PTA, 22 at 11:54 AM

## 2022-08-09 NOTE — PLAN OF CARE
Problem: ABCDS Injury Assessment  Goal: Absence of physical injury  8/9/2022 0055 by Cesar Hutchins RN  Outcome: Progressing     Problem: Skin/Tissue Integrity  Goal: Absence of new skin breakdown  Description: 1. Monitor for areas of redness and/or skin breakdown  2. Assess vascular access sites hourly  3. Every 4-6 hours minimum:  Change oxygen saturation probe site  4. Every 4-6 hours:  If on nasal continuous positive airway pressure, respiratory therapy assess nares and determine need for appliance change or resting period.   8/9/2022 0055 by Cesar Hutchins, RN  Outcome: Progressing

## 2022-08-09 NOTE — PROGRESS NOTES
PROGRESS NOTE:    Vascular Medicine and Interventional Radiology      Vascular Medicine and Interventional Radiology:    PROGRESS NOTE:       Mandy Correia  : 1932  MR #: [id number]       PCP:  Cm Meraz MD     Attending Physician: Cindy Vick DO     Date of Admission: 8/3/2022  1:38 PM     Chief Complaint: abdominal drain       SUBJECTIVE:   Doyce Setting A Koutsaftis lying comfortably in bed. Her daughter in law is present. S/P 3 week percutaneous placement of abscess drain to right colonic soft tissue mass. Catheter continues to drain average 20cc or more per shift of tan/pink thick fluid. Patient denies abdominal pain, nausea, vomiting, fever, chills. States she is eating well and feeling well, Getting stronger each day in [facility] and looking forward for potential discharge next week. Past Medical History:   has a past medical history of Ascending aortic aneurysm ([address] Charcot Arleen Tooth muscular atrophy, CHF (congestive heart failure) ([facility]), Chronic diastolic CHF (congestive heart failure) ([address] Depression, Descending aortic aneurysm ([address] Essential hypertension, Headache, HTN (hypertension), Impaired mobility and activities of daily living, Lumbar stenosis with neurogenic claudication, Myelopathy ([address] and Osteoarthritis. Past SurgicalHistory:   has a past surgical history that includes joint replacement (Bilateral); thoracentesis (Left, 2021); Pleural Cath Insertion (Left, 2021); and other surgical history (Right, 2022). Allergies:Latex and Tape [facility] tape]    Home Medications:   Prior to Admission medications    Medication Sig Start Date End Date Taking?  Authorizing Provider   meclizine (ANTIVERT) 12.5 MG tablet Take 12.5 mg by mouth 4 times daily as needed    Historical Provider, MD   potassium chloride (KLOR-CON M) 20 MEQ extended release tablet Take 20 mEq by mouth daily (with breakfast)    Historical Provider, MD   digoxin (LANOXIN) 125 MCG tablet TAKE 1 TABLET DAILY 4/4/22   ADRIENNE Lyn CNP   furosemide (LASIX) 20 MG tablet TAKE 1 TABLET DAILY 4/4/22   ADRIENNE Lyn CNP   amLODIPine (NORVASC) 5 MG tablet Take 1 tablet by mouth daily 4/1/22   Jorge LAKE Holiday, DO   pantoprazole (PROTONIX) 40 MG tablet TAKE 1 TABLET DAILY 2/11/22   ADRIENNE Horvath CNP   carvedilol (COREG) 6.25 MG tablet TAKE 1 TABLET TWICE A DAY 1/17/22   ADRIENNE Colvin CNP   XARELTO 15 MG TABS tablet TAKE 1 TABLET DAILY 1/3/22   ADRIENNE Horvath CNP   nitroGLYCERIN (NITROSTAT) 0.4 MG SL tablet up to max of 3 total doses. If no relief after 1 dose, call 911. 3/1/21   ADRIENNE Paul NP   budesonide-formoterol [facility]) 160-4.5 MCG/ACT AERO Inhale 2 puffs into the lungs 2 times daily 3/1/21   ADRIENNE Paul NP   ferrous sulfate (IRON 325) 325 (65 Fe) MG tablet Take 1 tablet by mouth 2 times daily (with meals) 3/1/21   ADRIENNE Paul NP   Carboxymethylcellulose Sodium (EYE DROPS OP) Apply 1 drop to eye as needed (Dry eyes) Indications: Systane     Historical Provider, MD   Boswellia-Glucosamine-Vit D (OSTEO BI-FLEX ONE PER DAY) TABS Take by mouth daily    Historical Provider, MD   Multiple Vitamins-Minerals (CENTRUM SILVER ULTRA WOMENS) TABS Take by mouth daily    Historical Provider, MD   vitamin B-12 (CYANOCOBALAMIN) 1000 MCG tablet Take 1,000 mcg by mouth daily    Historical Provider, MD   aspirin 81 MG tablet Take 81 mg by mouth daily     Historical Provider, MD   citalopram (CELEXA) 20 MG tablet Take 20 mg by mouth daily  4/1/16   Historical Provider, MD   SYNTHROID 88 MCG tablet Take 88 mcg by mouth daily  3/19/16   Historical Provider, MD        Family History:   Family History   Problem Relation Age of Onset    Arthritis Mother     Arthritis Father     High Blood Pressure Father       SocialHistory:    Social History     Socioeconomic History    Marital status:       Spouse name: Not on file    Number of children: 2    Years of education: Not on file    Highest education level: Not on file   Occupational History    Not on file   Tobacco Use    Smoking status: Never    Smokeless tobacco: Never   Substance and Sexual Activity    Alcohol use: No     Alcohol/week: 0.0 standard drinks    Drug use: No    Sexual activity: Not on file   Other Topics Concern    Not on file   Social History Narrative    , Lives With: Alone, son lives down the street, dtr is in the area    Type of Home: [address]: One level    Home Access: Stairs to enter with rails- Number of Steps: 2- Rails: Both    Bathroom Shower/Tub: Tub/Shower unit, Bathroom Equipment: Grab bars in shower, Shower chair    Home Equipment: Rolling walker, Cane(Pt infrequemtly uses DME for ambulation and prefers to furniture walk in home)    ADL Assistance: Independent, [address]: Independent    Homemaking Responsibilities: Yes    Ambulation Assistance: Independent, Transfer Assistance: Independent    Additional Comments: Son stops over 2 times daily         Social Determinants of Health     Financial Resource Strain: Not on file   Food Insecurity: Not on file   Transportation Needs: Not on file   Physical Activity: Not on file   Stress: Not on file   Social Connections: Not on file   Intimate Partner Violence: Not on file   Housing Stability: Not on file        ROS:   Review of Systems   Constitutional: Negative. Negative for chills, fatigue and fever. HENT: Negative. Negative for congestion, ear pain, sore throat and trouble swallowing. Eyes: Negative. Negative for visual disturbance. Respiratory: Negative. Negative for cough, shortness of breath and wheezing. Cardiovascular: Negative. Negative for chest pain, palpitations and leg swelling. Gastrointestinal: Negative. Negative for abdominal pain, diarrhea, nausea and vomiting. Right lower abdominal abscess drain   Endocrine: Negative. Genitourinary: Negative. Negative for difficulty urinating, dysuria and hematuria. Musculoskeletal:  Positive for gait problem (uses walker or person assist). Negative for back pain. Skin: Negative. Negative for color change, rash and wound. Neurological:  Positive for weakness (generalized but improving). Negative for dizziness, light-headedness, numbness and headaches. Hematological: Negative. Does not bruise/bleed easily. Psychiatric/Behavioral: Negative. The patient is not nervous/anxious. All other systems reviewed and are negative. Objective:   Vitals: BP (!) 149/79   Pulse 59   Temp 98.1 °F (36.7 °C) (Oral)   Resp 13   Ht 5' (1.524 m)   Wt 164 lb (74.4 kg)   SpO2 98%   BMI 32.03 kg/m²      Physical Examination:      Physical Exam  Constitutional:       General: She is not in acute distress. Appearance: Normal appearance. She is not ill-appearing. HENT:      Head: Normocephalic. Nose: No congestion. Cardiovascular:      Rate and Rhythm: Normal rate. Heart sounds: Normal heart sounds. Pulmonary:      Effort: Pulmonary effort is normal.   Abdominal:      General: Bowel sounds are normal. There is no distension. Palpations: Abdomen is soft. There is no mass. Tenderness: There is no abdominal tenderness. There is no guarding. Comments: RLQ abdominal abscess drain with small amount of thick tan/bloody fluid in bag. Musculoskeletal:         General: Normal range of motion. Cervical back: Normal range of motion. Right lower leg: No edema. Left lower leg: No edema. Skin:     General: Skin is warm and dry. Neurological:      Mental Status: She is alert and oriented to person, place, and time.    Psychiatric:         Mood and Affect: Mood normal.         Behavior: Behavior normal.       ASSESSMENT:    Right pericolic abscess: S/P 3 week percutaneous placement of abscess drain with average drainage of 20 cc or more per shift of tan/pink thick fluid. --  Abscess dressing removed today and new Stayfix applied. Patient tolerated well. POC discussed with RN Milagros Rodriguez. Site clean without S/Sx infection. PLAN:     ABSCESS CATHETER ORDERS:     1. Empty drainage bag daily and PRN and record amount. Please provide patient with small measuring cup home going. 2. Keep to continuous suction by depressing the sinai (accordian). 3. Stay Fix [place] dressing will be changed every two weeks in IR office once discharged or weekly and PRN during [facility] stay. Follow up appt. To be made by IR office. Otherwise, if assistance is needed with Stayfix dressing or needs changed, please notify Interventional Radiology Clinic. 4. May cover Stay Fix/site with comfort dressing with ABD or 4x4's. Change daily and PRN. 5. Please instruct patient prior to discharge how to drain, record, apply suction daily. 6. During [facility] stay, RN or MD to notify IR clinic when drainage is less than 0cc/24 Hrs. to discuss possible catheter removal if Abdominal CT shows abscess resolved. CT to be ordered by IR. Once discharged, patient is to call and notify IR Clinic when drainage is less than 0cc/24 Hrs. To discuss possible catheter removal. Thank you.

## 2022-08-09 NOTE — PROGRESS NOTES
Progress Note  Patient: Ag Ricci  Unit/Bed: R250/R250-01  YOB: 1932  MRN: [id number]  Acct: [de-identified]   Admitting Diagnosis: Impaired mobility and activities of daily living [Z74.09, Z78.9]  Impaired mobility and ADLs [Z74.09, Z78.9]  Admit Date:  8/3/2022  Hospital Day: 6    Chief Complaint:  CAD    Histories:  Past Medical History:   Diagnosis Date    Ascending aortic aneurysm ([address] 6/23/2017    Charcot Arleen Tooth muscular atrophy     CHF (congestive heart failure) ([facility])     Chronic diastolic CHF (congestive heart failure) ([address] 4/1/2022    Depression     Descending aortic aneurysm ([facility] 6/23/2017    Essential hypertension 2/16/2018    Headache     HTN (hypertension)     Impaired mobility and activities of daily living     Lumbar stenosis with neurogenic claudication     Myelopathy ([address]     Osteoarthritis      Past Surgical History:   Procedure Laterality Date    JOINT REPLACEMENT Bilateral     knees    OTHER SURGICAL HISTORY Right 07/21/2022    cat scan guided abdominal mass aspiration with drain placement by Dr. Gertrudis Maxwell Left 02/25/2021    LEFT PLEURAL CATHETER INSERTION performed by Kun Cordova MD at [place] [id number] Left 01/29/2021    total of 755 cc removed per Dr Malon Alpers specimen sent to lab     Family History   Problem Relation Age of Onset    Arthritis Mother     Arthritis Father     High Blood Pressure Father      Social History     Socioeconomic History    Marital status:       Spouse name: None    Number of children: 2    Years of education: None    Highest education level: None   Tobacco Use    Smoking status: Never    Smokeless tobacco: Never   Substance and Sexual Activity    Alcohol use: No     Alcohol/week: 0.0 standard drinks    Drug use: No   Social History Narrative    , Lives With: Alone, son lives down the street, dtr is in the area    Type of Home: [facility]  in [address]: One level    Home Access: Stairs to enter with rails- Number of Steps: 2- Rails: Both    Bathroom Shower/Tub: Tub/Shower unit, Bathroom Equipment: Grab bars in shower, Shower chair    Home Equipment: Rolling walker, Cane(Pt infrequemtly uses DME for ambulation and prefers to furniture walk in home)    ADL Assistance: [address], [address]: Independent    Homemaking Responsibilities: Yes    Ambulation Assistance: Independent, Transfer Assistance: Independent    Additional Comments: Son stops over 2 times daily           Subjective/HPI  No new events doing  vitals stable INR 2.0 this am.     EKG:        Review of Systems:   Review of Systems   Constitutional: Negative. Negative for diaphoresis and fatigue. HENT: Negative. Eyes: Negative. Respiratory: Negative. Negative for cough, chest tightness, shortness of breath, wheezing and stridor. Cardiovascular: Negative. Negative for chest pain, palpitations and leg swelling. Gastrointestinal: Negative. Negative for blood in stool and nausea. Genitourinary: Negative. Musculoskeletal:  Positive for arthralgias, back pain and gait problem. Skin: Negative. Neurological:  Positive for weakness. Negative for dizziness, syncope and light-headedness. Hematological: Negative. Psychiatric/Behavioral: Negative. Physical Examination:    BP (!) 149/79   Pulse 59   Temp 98.1 °F (36.7 °C) (Oral)   Resp 13   Ht 5' (1.524 m)   Wt 164 lb (74.4 kg)   SpO2 98%   BMI 32.03 kg/m²    Physical Exam   Constitutional: No distress. She appears chronically ill. HENT:   Normal cephalic and Atraumatic   Eyes: Pupils are equal, round, and reactive to light. Neck: Thyroid normal. No JVD present. No neck adenopathy. No thyromegaly present. Cardiovascular: Normal rate, regular rhythm, intact distal pulses and normal pulses. Murmur heard. Pulmonary/Chest: Effort normal and breath sounds normal. She has no wheezes. She has no rales.  She exhibits no tenderness. Abdominal: Soft. Bowel sounds are normal. There is no abdominal tenderness. Musculoskeletal:         General: No tenderness or edema. Normal range of motion. Cervical back: Normal range of motion and neck supple. Neurological: She is alert and oriented to person, place, and time. Skin: Skin is warm. No cyanosis. Nails show no clubbing.      LABS:  CBC:   Lab Results   Component Value Date/Time    WBC 6.2 08/04/2022 03:28 AM    RBC 3.62 08/04/2022 03:28 AM    HGB 10.1 08/04/2022 03:28 AM    HCT 30.4 08/04/2022 03:28 AM    MCV 83.9 08/04/2022 03:28 AM    MCH 27.9 08/04/2022 03:28 AM    MCHC 33.3 08/04/2022 03:28 AM    RDW 15.8 08/04/2022 03:28 AM     08/04/2022 03:28 AM     CBC with Differential:    Lab Results   Component Value Date/Time    WBC 6.2 08/04/2022 03:28 AM    RBC 3.62 08/04/2022 03:28 AM    HGB 10.1 08/04/2022 03:28 AM    HCT 30.4 08/04/2022 03:28 AM     08/04/2022 03:28 AM    MCV 83.9 08/04/2022 03:28 AM    MCH 27.9 08/04/2022 03:28 AM    MCHC 33.3 08/04/2022 03:28 AM    RDW 15.8 08/04/2022 03:28 AM    BANDSPCT 2 07/24/2022 05:00 AM    METASPCT 2 07/24/2022 05:00 AM    LYMPHOPCT 7.0 07/24/2022 05:00 AM    MONOPCT 5.8 07/24/2022 05:00 AM    BASOPCT 1.0 07/24/2022 05:00 AM    MONOSABS 0.7 07/24/2022 05:00 AM    LYMPHSABS 0.8 07/24/2022 05:00 AM    EOSABS 0.0 07/24/2022 05:00 AM    BASOSABS 0.1 07/24/2022 05:00 AM     CMP:    Lab Results   Component Value Date/Time     08/04/2022 03:28 AM    K 3.9 08/04/2022 03:28 AM    K 3.2 07/24/2022 05:00 AM     08/04/2022 03:28 AM    CO2 27 08/04/2022 03:28 AM    BUN 14 08/04/2022 03:28 AM    CREATININE 0.56 08/04/2022 03:28 AM    GFRAA >60.0 08/04/2022 03:28 AM    LABGLOM >60.0 08/04/2022 03:28 AM    GLUCOSE 110 08/04/2022 03:28 AM    PROT 6.0 07/24/2022 05:00 AM    LABALBU 3.0 08/04/2022 03:28 AM    CALCIUM 8.8 08/04/2022 03:28 AM    BILITOT 0.3 07/24/2022 05:00 AM    ALKPHOS 51 07/24/2022 05:00 AM    AST 20 07/24/2022 05:00 AM    ALT 19 07/24/2022 05:00 AM     BMP:    Lab Results   Component Value Date/Time     08/04/2022 03:28 AM    K 3.9 08/04/2022 03:28 AM    K 3.2 07/24/2022 05:00 AM     08/04/2022 03:28 AM    CO2 27 08/04/2022 03:28 AM    BUN 14 08/04/2022 03:28 AM    LABALBU 3.0 08/04/2022 03:28 AM    CREATININE 0.56 08/04/2022 03:28 AM    CALCIUM 8.8 08/04/2022 03:28 AM    GFRAA >60.0 08/04/2022 03:28 AM    LABGLOM >60.0 08/04/2022 03:28 AM    GLUCOSE 110 08/04/2022 03:28 AM     Magnesium:    Lab Results   Component Value Date/Time    MG 2.1 08/03/2022 05:18 AM     Troponin:    Lab Results   Component Value Date/Time    TROPONINI <0.010 07/18/2022 12:30 PM        Active Hospital Problems    Diagnosis Date Noted    A-fib [facility]) [I48.91]      Priority: High    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 [K65.1] 08/08/2022     Priority: Medium    Abdominal pain [R10.9] 07/27/2022     Priority: Medium    Lumbar stenosis with neurogenic claudication [M48.062] 06/02/2016     Priority: Medium    Chronic diastolic CHF (congestive heart failure) ([facility] [I50.32] 04/01/2022     Priority: Low    Acute on chronic combined systolic and diastolic CHF (congestive heart failure) ([address] [I50.43] 01/25/2021     Priority: Low    Descending aortic aneurysm ([address] [I71.9] 06/23/2017     Priority: Low        Assessment/Plan:  Impression/Plan:   Abd Abscess/Mass - s/p Drainage  LVEF 60  Symptoms of Orthopnea- lungs are clear no JVD nor peripheral edema. - will give Empiric low dose Lasix. Will need periodic Lab at Rehab  Frequent PAF - rate is controlled on exam.  CAD- moderate - no angina. Continue BB  HTN Stable but elevated this am. Will observe. HPL - statin on hold  Ascending AO aneurysm-  had increased in size and measures 5.3cm distal arch.  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 Xarelto stopped and started Heparin. Had long discussion with pt and daughter. They no longer want aggressive Rx and does not want to be transferred to [facility]. DC Lovenox.  Keep INR 2-3         Electronically signed by Claudia Serna MD on 8/9/2022 at 8:08 AM

## 2022-08-09 NOTE — PROGRESS NOTES
OCCUPATIONAL THERAPY  INPATIENT REHAB TREATMENT NOTE  [facility] Page      NAME: Ayla Rogel  : 1932 (80 y.o.)  MRN: [id number]  CODE STATUS: DNR-CC  Room: R250/R250-01    Date of Service: 2022    Referring Physician: Dr. Gracia Wilson Diagnosis: Impaired mobility and ADL's due to Charcot Shanta Felder Tooth Neuropathy    Restrictions  Restrictions/Precautions  Restrictions/Precautions: Fall Risk         Position Activity Restriction  Other position/activity restrictions: Abdominal drain    Patient's date of birth confirmed: Yes    SAFETY:  Safety Devices  Safety Devices in place: Yes  Type of devices: All fall risk precautions in place    SUBJECTIVE:    Pain at start of treatment: No    Pain at end of treatment: No      OBJECTIVE: Assisted pt to transport cart in preparation for CT Scan. ADL  Putting On/Taking Off Footwear  Assistance Level: Dependent  Skilled Clinical Factors: To doff bilateral shoes/AFOs and don bilateral socks    Transfers  Sit to Stand  Assistance Level: Stand by assist  Stand to Sit  Assistance Level: Stand by assist    Functional Mobility:  Device: FWW  Activity: To/From "[employer] to [employer]  Assistance Level: CGA       ASSESSMENT:  Activity Tolerance: Patient tolerated treatment well    PLAN OF CARE:  Strengthening, Balance training, Functional mobility training, Neuromuscular re-education, Endurance training, Self-Care / ADL, Cognitive/Perceptual training, Safety education & training, Home management training, Patient/Caregiver education & training  Continue per OT POC for planned d/c on 22    Patient goals : \"to be able to wash my back and my feet. \"  Time Frame for Long term goals : 2 weeks  Long Term Goal 1: Pt within two weeks of evaluation will demonstrate progress to goals to address areas as stated below to increase functional independence for return to PLOF  Long Term Goal 2: Pt will increase independence with ADL Pt will increase independence with ADL transfers  Long Term Goal 3: Pt will increase bilateral UE strength to increase ease of ADL transfers      Therapy Time:   Individual Group Co-Treat   Time In 1530       Time Out 1540         Minutes 10       Pt seen to make up missed minutes from 08-06-22 (+10 minutes)    ADL/IADL training: 10 minutes     Electronically signed by PRUDENCE Dorman on 8/9/2022 at 3:57 PM

## 2022-08-09 NOTE — PROGRESS NOTES
Agree with LPN assessment of pt. INR is 2.0, warfarin dosed by pharmacy. CT of abd and pelvis complete. New IV abx orders received.

## 2022-08-09 NOTE — CARE COORDINATION
LSW called son Keena Terry) earlier and inquired on the certain dialect of [place] that pt speaks so that an interpretor can be located. The iPad  is not an option as it had been tried in the past but due to patient's hearing, it has not been successful. Mira stated that LSW will not be able to find someone else aside from him and his wife that speak it due to the special dialect. Alcides explained that pt is from a very [address] named \"[place]\" that cannot be found on a map. He further noted that Nicole Faulkner is a [address]. Mira insisted that LSW will not find it and stated \"good luck\". LSW was able to find a large [address] named \"Ev[place]\" and another [address] named \"[place]\", but could not find what Mira reported. JACKLYN did consult with ΣΑΡΑΝΤΙ (nursing supervisor) regarding the need for an interpretor and it was decided that the proper course of action would be to call the Language Services team via [phone]. JACKLYN called the number and when prompted, JACKLYN was directed to email [email or web address]. LSW sent an email and will call Language Services again once they are open on 8/10.  Electronically signed by KIN De Anda, JACKLYN on 8/9/2022 at 4:50 PM

## 2022-08-09 NOTE — PROGRESS NOTES
OCCUPATIONAL THERAPY  INPATIENT REHAB TREATMENT NOTE  [facility]      NAME: Marcella Malik  : 1932 (80 y.o.)  MRN: [id number]  CODE STATUS: DNR-CC  Room: R250/R250-01    Date of Service: 2022    Referring Physician: Dr. Rolf Estrada Diagnosis: Impaired mobility and ADL's due to Charcot Sharla Griffins Tooth Neuropathy    Restrictions  Restrictions/Precautions  Restrictions/Precautions: Fall Risk  Required Braces or Orthoses?: No     Patient's date of birth confirmed: Yes    SAFETY:  Safety Devices  Safety Devices in place: Yes  Type of devices: All fall risk precautions in place    SUBJECTIVE:  Subjective: \" Thank you girl. \"     Pain at start of treatment:  No 0/10    Pain at end of treatment:   No 0/10    COGNITION:  Orientation  Overall Orientation Status: Within Functional Limits  Cognition  Overall Cognitive Status: Exceptions  Cognition Comment: Comp Sup, expression Mod I, social Ind, problem min A, Memory Sup    OBJECTIVE:    Grooming/Oral Hygiene  Assistance Level: Independent  Putting On/Taking Off Footwear  Assistance Level: Dependent  Skilled Clinical Factors: B socks and shoes with AFO's  Toileting  Assistance Level: Supervision  Toilet Transfers  Technique: Stand step  Equipment: Grab bars;Standard toilet  Assistance Level: Stand by assist  Skilled Clinical Factors: ww    Instrumental ADL's  Instrumental ADLs: Yes  Light Housekeeping  Light Housekeeping Level: Wheelchair  Light Housekeeping Level of Assistance: Supervision  Light Housekeeping:   Patient able to sort and match multiple pairs of socks. Patient at Supervision 2° MAX FM difficulty folding socks together. ASSESSMENT: Patient worked at a steady pace.     Activity Tolerance: Patient tolerated treatment well      PLAN OF CARE:  Strengthening, Balance training, Functional mobility training, Neuromuscular re-education, Endurance training, Self-Care / ADL, Cognitive/Perceptual training, Safety education & training, Home management training, Patient/Caregiver education & training    Continue per OT POC for planned d/c on 22    Patient goals : \"to be able to wash my back and my feet. \"  Time Frame for Long term goals : 2 weeks  Long Term Goal 1: Pt within two weeks of evaluation will demonstrate progress to goals to address areas as stated below to increase functional independence for return to PLOF  Long Term Goal 2: Pt will increase independence with ADL Pt will increase independence with ADL transfers  Long Term Goal 3: Pt will increase bilateral UE strength to increase ease of ADL transfers        Therapy Time:   Individual Group Co-Treat   Time In 1400       Time Out 1430         Minutes 30                   ADL/IADL trainin minutes     Electronically signed by:    PRUDENCE Guidry,   2022, 4:16 PM

## 2022-08-09 NOTE — PROGRESS NOTES
Assessment completed. A&O x4. Denies pain at this time. Denies dizziness. Was given scheduled Antivert before breakfast. RUQ drsg where drain was placed is clean, dry and intact. INR 2.0 today. Pharmacy dosing Coumadin. Dr Addie Galvan d/c'd Lovenox. In chair with alarm activated. Call light in reach. Electronically signed by Nahum Victor LPN on 1/6/3514 at 25:70 AM      Perfect served Governor Will HENAO regarding possibly changing current abscess drain to a smaller one before pt is dc'd to home. Traci Calderon stated with still draining between 15-20 ml a shift, there is no way to change drain at this time. Traci Calderon will follow pt while in hospital then see pt outpatient when she is dc'd. This nurse also called Dr Elham Saunders regarding IV antibiotics. Last ID note was from Dr Sondra Hill when she switched IV antibiotics from Zosyn to Rocephin on 7/27/22. Per Dr Cristel Hill note, antibiotics will be a 2 week course. Tomorrow 8/10/22 is 2 weeks from start date. ID consulted. Electronically signed by Nahum Victor LPN on 5/2/1005 at 36:79 PM      Pt transported to CT via cart. No s/s of distress.  Electronically signed by Nahum Victor LPN on 1/7/9790 at 0:51 PM

## 2022-08-09 NOTE — PROGRESS NOTES
Warfarin Dosing - Pharmacy Consult Note  Consulting Provider: Dr Chetan Ferguson    Indication:   thrombus  Warfarin Dose prior to admission: n/a   Concurrent anticoagulants/antiplatelets: n/a  Significant Drug Interactions: Celexa, Lovenox bridging  Recent Labs     08/07/22  0534 08/08/22  0459 08/09/22  0426   INR 1.6 1.9 2.0     Recent warfarin administrations                     warfarin (COUMADIN) tablet 7.5 mg (mg) 7.5 mg Given 08/08/22 1725    warfarin (COUMADIN) tablet 7.5 mg (mg) 7.5 mg Given 08/07/22 1736    warfarin (COUMADIN) tablet 7.5 mg (mg) 7.5 mg Given 08/06/22 1721                Date        INR       Dose  08/02/22   1.2       5 mg  08/03/22   1.1       5 mg  08/04/22   1.1    7.5 mg  08/05/22   1.1    7.5 mg   08/06/22   1.4    7.5 mg  08/07/22   1.6    7.5 mg  08/08/22   1.9    7.5 mg   08/09/22   2.0    7.5 mg     Assessment/Plan  (Goal INR: 2 - 3)  INR of 2.0 is therapeutic for goal range 2-3  Will repeat dose of warfarin 7.5 mg today  Lovenox bridging complete      Active problem list reviewed. INR orders are placed. Chart reviewed for pertinent labs, drug/diet interactions, and past doses. Documentation of patient's clinical condition was reviewed. Pharmacy Dosing:  Pharmacy will continue to follow.       Lorri Bran PharmD  8/9/2022 11:17 AM

## 2022-08-09 NOTE — PROGRESS NOTES
OCCUPATIONAL THERAPY  INPATIENT REHAB TREATMENT NOTE  [facility]      NAME: Hannah Andre  : 1932 (80 y.o.)  MRN: [id number]  CODE STATUS: DNR-CC  Room: R250/R250-01    Date of Service: 2022    Referring Physician: Dr. 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 \"     Pain at start of treatment:  No 0/10    Pain at end of treatment:   No 0/10    COGNITION:  Orientation  Overall Orientation Status: Within Functional Limits  Cognition  Overall Cognitive Status: Exceptions  Cognition Comment: Comp Sup, expression Mod I, social Ind, problem min A, Memory Sup    OBJECTIVE:    Grooming/Oral Hygiene  Assistance Level: Independent  Upper Extremity Bathing  Assistance Level: Set-up  Skilled Clinical Factors: Patient completed sponge bath seated in armchair at bathrooo sink  Lower Extremity Bathing  Assistance Level: Stand by assist  Upper Extremity Dressing  Assistance Level: Supervision  Lower Extremity Dressing  Assistance Level: Minimal assistance  Skilled Clinical Factors: thread R LE x 1/2  Putting On/Taking Off Footwear  Assistance Level: Dependent  Skilled Clinical Factors: B socks  Toileting  Assistance Level: Supervision  Toilet Transfers  Technique: Stand step  Equipment: Grab bars;Standard toilet  Assistance Level: Stand by assist  Skilled Clinical Factors: ww    UE Strengthening:  Patient engaged in B UE ROM and strengthening following UE strengthening HEP to increase I with ADL's and transfers. Patient utilized 1/2 # hand wt 1 X 10 repetitions in various planes with RB's as needed.    Patient required MIN verbal cues for proper technique. Patient required 0 verbal cues for correct count. Patient with rest breaks as needed. ASSESSMENT: Patient pleasant throughout session. Activity Tolerance: Patient tolerated treatment well      PLAN OF CARE: Patient's plan of care was discussed by team Nelda and Arlen at Monday POC review meeting. Strengthening, Balance training, Functional mobility training, Neuromuscular re-education, Endurance training, Self-Care / ADL, Cognitive/Perceptual training, Safety education & training, Home management training, Patient/Caregiver education & training    Continue per OT POC for planned d/c on 22    Patient goals : \"to be able to wash my back and my feet. \"  Time Frame for Long term goals : 2 weeks  Long Term Goal 1: Pt within two weeks of evaluation will demonstrate progress to goals to address areas as stated below to increase functional independence for return to PLOF  Long Term Goal 2: Pt will increase independence with ADL Pt will increase independence with ADL transfers  Long Term Goal 3: Pt will increase bilateral UE strength to increase ease of ADL transfers        Therapy Time:   Individual Group Co-Treat   Time In 0830       Time Out 0930         Minutes 60                   ADL/IADL trainin minutes  Therapeutic activities: 15 minutes     Electronically signed by:    Billee Severin, OTA,   2022, 10:33 AM

## 2022-08-09 NOTE — PROGRESS NOTES
Patient has no c/o pain or discomfort noted at this time. Ambulates with walker and CGA x1. No c/o dizziness noted. ABD dressing is clean, dry and intact. [address] drainage noted in the drain, <5 ml of drainage emptied.

## 2022-08-10 LAB
INR BLD: 2.1
PROTHROMBIN TIME: 22.8 SEC (ref 12.3–14.9)

## 2022-08-10 PROCEDURE — 36415 COLL VENOUS BLD VENIPUNCTURE: CPT

## 2022-08-10 PROCEDURE — 99232 SBSQ HOSP IP/OBS MODERATE 35: CPT | Performed by: INTERNAL MEDICINE

## 2022-08-10 PROCEDURE — 2580000003 HC RX 258: Performed by: INTERNAL MEDICINE

## 2022-08-10 PROCEDURE — 6370000000 HC RX 637 (ALT 250 FOR IP): Performed by: INTERNAL MEDICINE

## 2022-08-10 PROCEDURE — 97110 THERAPEUTIC EXERCISES: CPT

## 2022-08-10 PROCEDURE — 97530 THERAPEUTIC ACTIVITIES: CPT

## 2022-08-10 PROCEDURE — 97116 GAIT TRAINING THERAPY: CPT

## 2022-08-10 PROCEDURE — 6360000002 HC RX W HCPCS: Performed by: INTERNAL MEDICINE

## 2022-08-10 PROCEDURE — 6370000000 HC RX 637 (ALT 250 FOR IP): Performed by: PHYSICAL MEDICINE & REHABILITATION

## 2022-08-10 PROCEDURE — 6360000002 HC RX W HCPCS: Performed by: PHYSICAL MEDICINE & REHABILITATION

## 2022-08-10 PROCEDURE — 97112 NEUROMUSCULAR REEDUCATION: CPT

## 2022-08-10 PROCEDURE — 97535 SELF CARE MNGMENT TRAINING: CPT

## 2022-08-10 PROCEDURE — 1180000000 HC REHAB R&B

## 2022-08-10 PROCEDURE — 2700000000 HC OXYGEN THERAPY PER DAY

## 2022-08-10 PROCEDURE — 99232 SBSQ HOSP IP/OBS MODERATE 35: CPT | Performed by: PHYSICAL MEDICINE & REHABILITATION

## 2022-08-10 PROCEDURE — 85610 PROTHROMBIN TIME: CPT

## 2022-08-10 RX ORDER — WARFARIN SODIUM 5 MG/1
7.5 TABLET ORAL
Status: COMPLETED | OUTPATIENT
Start: 2022-08-10 | End: 2022-08-10

## 2022-08-10 RX ADMIN — LACTOBACILLUS TAB 2 TABLET: TAB at 08:29

## 2022-08-10 RX ADMIN — MEROPENEM 1000 MG: 1 INJECTION, POWDER, FOR SOLUTION INTRAVENOUS at 21:17

## 2022-08-10 RX ADMIN — LACTOBACILLUS TAB 2 TABLET: TAB at 13:11

## 2022-08-10 RX ADMIN — FUROSEMIDE 20 MG: 20 TABLET ORAL at 08:29

## 2022-08-10 RX ADMIN — LEVOTHYROXINE SODIUM 88 MCG: 88 TABLET ORAL at 08:29

## 2022-08-10 RX ADMIN — CITALOPRAM HYDROBROMIDE 20 MG: 10 TABLET ORAL at 08:29

## 2022-08-10 RX ADMIN — Medication 100 MG: at 18:31

## 2022-08-10 RX ADMIN — Medication 10 ML: at 13:00

## 2022-08-10 RX ADMIN — Medication 2000 UNITS: at 18:31

## 2022-08-10 RX ADMIN — MECLIZINE 12.5 MG: 12.5 TABLET ORAL at 06:28

## 2022-08-10 RX ADMIN — MICONAZOLE NITRATE: 2 POWDER TOPICAL at 21:35

## 2022-08-10 RX ADMIN — SODIUM CHLORIDE, PRESERVATIVE FREE 10 ML: 5 INJECTION INTRAVENOUS at 21:56

## 2022-08-10 RX ADMIN — MICONAZOLE NITRATE: 2 POWDER TOPICAL at 08:29

## 2022-08-10 RX ADMIN — CARVEDILOL 6.25 MG: 6.25 TABLET, FILM COATED ORAL at 18:35

## 2022-08-10 RX ADMIN — CARVEDILOL 6.25 MG: 6.25 TABLET, FILM COATED ORAL at 08:29

## 2022-08-10 RX ADMIN — Medication 10 ML: at 21:35

## 2022-08-10 RX ADMIN — FERROUS SULFATE TAB 325 MG (65 MG ELEMENTAL FE) 325 MG: 325 (65 FE) TAB at 18:31

## 2022-08-10 RX ADMIN — PANTOPRAZOLE SODIUM 40 MG: 40 TABLET, DELAYED RELEASE ORAL at 06:28

## 2022-08-10 RX ADMIN — CYANOCOBALAMIN 1000 MCG: 1000 INJECTION, SOLUTION INTRAMUSCULAR; SUBCUTANEOUS at 08:29

## 2022-08-10 RX ADMIN — LACTOBACILLUS TAB 2 TABLET: TAB at 21:15

## 2022-08-10 RX ADMIN — MEROPENEM 1000 MG: 1 INJECTION, POWDER, FOR SOLUTION INTRAVENOUS at 13:14

## 2022-08-10 RX ADMIN — MEROPENEM 1000 MG: 1 INJECTION, POWDER, FOR SOLUTION INTRAVENOUS at 06:28

## 2022-08-10 RX ADMIN — CLOTRIMAZOLE: 1 CREAM TOPICAL at 21:32

## 2022-08-10 RX ADMIN — WARFARIN SODIUM 7.5 MG: 5 TABLET ORAL at 18:32

## 2022-08-10 ASSESSMENT — ENCOUNTER SYMPTOMS
STRIDOR: 0
WHEEZING: 0
BACK PAIN: 1
CHEST TIGHTNESS: 0
SHORTNESS OF BREATH: 0
RESPIRATORY NEGATIVE: 1
GASTROINTESTINAL NEGATIVE: 1
COUGH: 0
BLOOD IN STOOL: 0
EYES NEGATIVE: 1
NAUSEA: 0

## 2022-08-10 ASSESSMENT — PAIN SCALES - GENERAL: PAINLEVEL_OUTOF10: 0

## 2022-08-10 NOTE — PROGRESS NOTES
OCCUPATIONAL THERAPY  INPATIENT REHAB TREATMENT NOTE  [facility]      NAME: Carina Simmons  : 1932 (80 y.o.)  MRN: [id number]  CODE STATUS: DNR-CC  Room: R250R250-01    Date of Service: 8/10/2022    Referring Physician: Dr. 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 I love you girl. \"     Pain at start of treatment:  No 0/10    Pain at end of treatment:   No 0/10    COGNITION:  Orientation  Overall Orientation Status: Within Functional Limits  Cognition  Overall Cognitive Status: Exceptions  Cognition Comment: Comp Mod I, expression Mod I, social Ind, problem Sup, Memory Sup    OBJECTIVE:    Grooming/Oral Hygiene  Assistance Level: Independent  Toileting  Assistance Level: Modified independent  Toilet Transfers  Technique: Stand step  Equipment: Grab bars;Standard toilet  Assistance Level: Supervision  Skilled Clinical Factors: ww    FM coordination and UE AROM/Strengthening:  Patient engaged in B UE AROM/strengthening and B FM coordination activity to increase [place] with ADL's and IADL's. Patient donned B 1/2 # wrist weights. Patient able to  clothespins from table top with MIN difficulty. Patient able to pinch open clothespins with MAX difficulty with gross grasp. Patient able to place clothespins on vertical yardstick with MOD-MAX difficulty. Patient with MOD -MAX difficulty with vertical reaching. Patient with rest breaks as needed. ASSESSMENT: Patient worked at a steady pace.     Activity Tolerance: Patient tolerated treatment well      PLAN OF CARE:  Strengthening, Balance training, Functional mobility training, Neuromuscular re-education, Endurance training, Self-Care / ADL, Cognitive/Perceptual training, Safety education & training, Home management training, Patient/Caregiver education & training    Continue per OT POC for planned d/c on 8-17-22    Patient goals : \"to be able to wash my back and my feet. \"  Time Frame for Long term goals : 2 weeks  Long Term Goal 1: Pt within two weeks of evaluation will demonstrate progress to goals to address areas as stated below to increase functional independence for return to PLOF  Long Term Goal 2: Pt will increase independence with ADL Pt will increase independence with ADL transfers  Long Term Goal 3: Pt will increase bilateral UE strength to increase ease of ADL transfers        Therapy Time:   Individual Group Co-Treat   Time In 1400       Time Out 1430         Minutes 30                   ADL/IADL training: 15 minutes  Therapeutic activities: 15 minutes     Electronically signed by:    PRUDENCE Jones,   8/10/2022, 3:02 PM

## 2022-08-10 NOTE — PROGRESS NOTES
Access: Stairs to enter with rails- Number of Steps: 2- Rails: Both    Bathroom Shower/Tub: Tub/Shower unit, Bathroom Equipment: Grab bars in shower, Shower chair    Home Equipment: Rolling walker, Cane(Pt infrequemtly uses DME for ambulation and prefers to furniture walk in home)    ADL Assistance: [address], [address]: Independent    Homemaking Responsibilities: Yes    Ambulation Assistance: Independent, Transfer Assistance: Independent    Additional Comments: Son stops over 2 times daily           Subjective/HPI  No new events doing  vitals stable INR 2.1 this am.   Lying falt comfortable no cp no sob    EKG:        Review of Systems:   Review of Systems   Constitutional: Negative. Negative for diaphoresis and fatigue. HENT: Negative. Eyes: Negative. Respiratory: Negative. Negative for cough, chest tightness, shortness of breath, wheezing and stridor. Cardiovascular: Negative. Negative for chest pain, palpitations and leg swelling. Gastrointestinal: Negative. Negative for blood in stool and nausea. Genitourinary: Negative. Musculoskeletal:  Positive for arthralgias, back pain and gait problem. Skin: Negative. Neurological:  Positive for weakness. Negative for dizziness, syncope and light-headedness. Hematological: Negative. Psychiatric/Behavioral: Negative. Physical Examination:    BP (!) 148/81   Pulse 63   Temp 97.5 °F (36.4 °C)   Resp 17   Ht 5' (1.524 m)   Wt 164 lb (74.4 kg)   SpO2 97%   BMI 32.03 kg/m²    Physical Exam   Constitutional: No distress. She appears chronically ill. HENT:   Normal cephalic and Atraumatic   Eyes: Pupils are equal, round, and reactive to light. Neck: Thyroid normal. No JVD present. No neck adenopathy. No thyromegaly present. Cardiovascular: Normal rate, regular rhythm, intact distal pulses and normal pulses. Murmur heard. Pulmonary/Chest: Effort normal and breath sounds normal. She has no wheezes.  She has no rales. She exhibits no tenderness. Abdominal: Soft. Bowel sounds are normal. There is no abdominal tenderness. Musculoskeletal:         General: No tenderness or edema. Normal range of motion. Cervical back: Normal range of motion and neck supple. Neurological: She is alert and oriented to person, place, and time. Skin: Skin is warm. No cyanosis. Nails show no clubbing.      LABS:  CBC:   Lab Results   Component Value Date/Time    WBC 6.2 08/04/2022 03:28 AM    RBC 3.62 08/04/2022 03:28 AM    HGB 10.1 08/04/2022 03:28 AM    HCT 30.4 08/04/2022 03:28 AM    MCV 83.9 08/04/2022 03:28 AM    MCH 27.9 08/04/2022 03:28 AM    MCHC 33.3 08/04/2022 03:28 AM    RDW 15.8 08/04/2022 03:28 AM     08/04/2022 03:28 AM     CBC with Differential:    Lab Results   Component Value Date/Time    WBC 6.2 08/04/2022 03:28 AM    RBC 3.62 08/04/2022 03:28 AM    HGB 10.1 08/04/2022 03:28 AM    HCT 30.4 08/04/2022 03:28 AM     08/04/2022 03:28 AM    MCV 83.9 08/04/2022 03:28 AM    MCH 27.9 08/04/2022 03:28 AM    MCHC 33.3 08/04/2022 03:28 AM    RDW 15.8 08/04/2022 03:28 AM    BANDSPCT 2 07/24/2022 05:00 AM    METASPCT 2 07/24/2022 05:00 AM    LYMPHOPCT 7.0 07/24/2022 05:00 AM    MONOPCT 5.8 07/24/2022 05:00 AM    BASOPCT 1.0 07/24/2022 05:00 AM    MONOSABS 0.7 07/24/2022 05:00 AM    LYMPHSABS 0.8 07/24/2022 05:00 AM    EOSABS 0.0 07/24/2022 05:00 AM    BASOSABS 0.1 07/24/2022 05:00 AM     CMP:    Lab Results   Component Value Date/Time     08/04/2022 03:28 AM    K 3.9 08/04/2022 03:28 AM    K 3.2 07/24/2022 05:00 AM     08/04/2022 03:28 AM    CO2 27 08/04/2022 03:28 AM    BUN 14 08/04/2022 03:28 AM    CREATININE 0.56 08/04/2022 03:28 AM    GFRAA >60.0 08/04/2022 03:28 AM    LABGLOM >60.0 08/04/2022 03:28 AM    GLUCOSE 110 08/04/2022 03:28 AM    PROT 6.0 07/24/2022 05:00 AM    LABALBU 3.0 08/04/2022 03:28 AM    CALCIUM 8.8 08/04/2022 03:28 AM    BILITOT 0.3 07/24/2022 05:00 AM    ALKPHOS 51 07/24/2022 05:00 AM    AST 20 07/24/2022 05:00 AM    ALT 19 07/24/2022 05:00 AM     BMP:    Lab Results   Component Value Date/Time     08/04/2022 03:28 AM    K 3.9 08/04/2022 03:28 AM    K 3.2 07/24/2022 05:00 AM     08/04/2022 03:28 AM    CO2 27 08/04/2022 03:28 AM    BUN 14 08/04/2022 03:28 AM    LABALBU 3.0 08/04/2022 03:28 AM    CREATININE 0.56 08/04/2022 03:28 AM    CALCIUM 8.8 08/04/2022 03:28 AM    GFRAA >60.0 08/04/2022 03:28 AM    LABGLOM >60.0 08/04/2022 03:28 AM    GLUCOSE 110 08/04/2022 03:28 AM     Magnesium:    Lab Results   Component Value Date/Time    MG 2.1 08/03/2022 05:18 AM     Troponin:    Lab Results   Component Value Date/Time    TROPONINI <0.010 07/18/2022 12:30 PM        Active Hospital Problems    Diagnosis Date Noted    A-fib [facility]) [I48.91]      Priority: High    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 [Z74.09, Z78.9]      Priority: High    Abscess of abdominal cavity ([facility] [address] [K65.1] 08/09/2022     Priority: Medium    Intra-abdominal abscess ([address] [K65.1] 08/08/2022     Priority: Medium    Abdominal pain [R10.9] 07/27/2022     Priority: Medium    Lumbar stenosis with neurogenic claudication [M48.062] 06/02/2016     Priority: Medium    Chronic diastolic CHF (congestive heart failure) ([facility] [address] [I50.32] 04/01/2022     Priority: Low    Acute on chronic combined systolic and diastolic CHF (congestive heart failure) ([address] [I50.43] 01/25/2021     Priority: Low    Descending aortic aneurysm ([facility] [address] [I71.9] 06/23/2017     Priority: Low        Assessment/Plan:  Impression/Plan:   Abd Abscess/Mass - s/p Drainage  LVEF 60  Symptoms of Orthopnea- lungs are clear no JVD nor peripheral edema. - will give Empiric low dose Lasix. Will need periodic Lab at Rehab  Frequent PAF - rate is controlled on exam.  CAD- moderate - no angina. Continue BB  HTN Stable but elevated this am. Will observe.    HPL - statin on hold  Ascending AO aneurysm-  had increased in size and measures 5.3cm distal arch. 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 Xarelto stopped and started Heparin. Had long discussion with pt and daughter. They no longer want aggressive Rx and does not want to be transferred to [facility]. DC Lovenox. Keep INR 2-3.   2.1 this am.          Electronically signed by Duy Babb MD on 8/10/2022 at 8:20 AM

## 2022-08-10 NOTE — PROGRESS NOTES
Warfarin Dosing - Pharmacy Consult Note  Consulting Provider: BRUNO  Indication:   thrombus  Warfarin Dose prior to admission: na   Concurrent anticoagulants/antiplatelets: na  Significant Drug Interactions: Celexa  Recent Labs     08/08/22  0459 08/09/22  0426 08/10/22  0443   INR 1.9 2.0 2.1     Recent warfarin administrations                     warfarin (COUMADIN) tablet 7.5 mg (mg) 7.5 mg Given 08/09/22 1722    warfarin (COUMADIN) tablet 7.5 mg (mg) 7.5 mg Given 08/08/22 1725    warfarin (COUMADIN) tablet 7.5 mg (mg) 7.5 mg Given 08/07/22 1736                   Date       INR    Dose  08/10/22    2.1               7.5 mg    Assessment/Plan  (Goal INR: 2 - 3)  Will give 7.5 mg warfarin today. Active problem list reviewed. INR orders are placed. Chart reviewed for pertinent labs, drug/diet interactions, and past doses. Documentation of patient's clinical condition was reviewed. Pharmacy Dosing:  Pharmacy will continue to follow. CARINA Jiménez Ph.  8/10/2022  9:38 AM

## 2022-08-10 NOTE — PROGRESS NOTES
to demonstrate improved functional mobility/balance  Patient Goals   Patient goals : does not state, family wishes pt to regain her indep    PLAN OF CARE/Safety:   Plan Comment: Cont per POC.     Therapy Time:   Individual   Time In 1100   Time Out 1200   Minutes 60     Minutes:  Transfer/Bed mobility training:10  Gait trainin  Neuro re education:15  Therapeutic ex:15    Carlos Sanchez PTA, 08/10/22 at 12:03 PM

## 2022-08-10 NOTE — PROGRESS NOTES
well      PLAN OF CARE:  Strengthening, Balance training, Functional mobility training, Neuromuscular re-education, Endurance training, Self-Care / ADL, Cognitive/Perceptual training, Safety education & training, Home management training, Patient/Caregiver education & training    Continue per OT POC for planned d/c on 22    Patient goals : \"to be able to wash my back and my feet. \"  Time Frame for Long term goals : 2 weeks  Long Term Goal 1: Pt within two weeks of evaluation will demonstrate progress to goals to address areas as stated below to increase functional independence for return to PLOF  Long Term Goal 2: Pt will increase independence with ADL Pt will increase independence with ADL transfers  Long Term Goal 3: Pt will increase bilateral UE strength to increase ease of ADL transfers        Therapy Time:   Individual Group Co-Treat   Time In 0830       Time Out 0930         Minutes 60                   ADL/IADL trainin minutes     Electronically signed by:    PRUDENCE Rajan,   8/10/2022, 9:42 AM

## 2022-08-10 NOTE — PROGRESS NOTES
Infectious Disease     Patient Name: Jesus Campos  Date: 8/10/2022  YOB: 1932  Medical Record Number: [id number]      Abdominal abscess          Patient presented 7/22 abdominal pain CT scan showing mass abscess in right lower quadrant had drainage catheter placed at that time was initially on Zosyn and switched to Rocephin  Abscess first found on CT scan 7/18/2022 and drainage of abscess performed on 7/21/2022    Catheter stopped working but not removed she was reevaluated by interventional radiology on 72/9 new catheter was not placed  CT scan showed enlarging aortic aneurysm with thrombus she was placed on a heparin drip which precluded the placement of the catheter    There was a plan to transfer patient to tertiary care but this did not happen he was transferred to rehab        CT of the Abdomen and Pelvis without intravenous contrast medium       History:  Approximate 1 week history of abdominal pain       Technical Factors:       CT imaging of the abdomen and pelvis were obtained and formatted as 5 mm contiguous axial images from the domes of the diaphragm to the symphysis pubis. Sagittal and coronal reconstructions were also obtained. Oral contrast medium: Barium sulfate, 450 mL. Intravenous contrast medium:  None. Comparison:  CT abdomen pelvis, July 18, 2022, 1444 hours, February 8, 2021. Findings:       Residual intravenous contrast medium from recent CT examination identified. Lungs:  Lung bases clear. Cardiac size enlarged, unchanged. Calcification at level of mitral valve. Small hiatal hernia. Liver:  Normal in size, shape, and attenuation. Bile Ducts:  Normal in caliber. Gallbladder:  No stones or wall thickening. Pancreas:  Normal without masses, cysts, ductal dilatation or calcification. Spleen:  Normal in size without masses or calcifications. No splenules. Kidneys:  Normal in size.   No hydronephrosis, masses, or stones. Adrenals:  Normal.        Small bowel:  Normal in caliber. See \"peritoneum \". Appendix:  Not visualized. Colon:  Normal in caliber. See \"peritoneum \". Peritoneum:  No free air. A bilobed, 10.0 x 8.0 x 10.6 cm mass having central rounded septated areas of low attenuation, 3.7 x 3.5 x 5.4 cm, and displacing cecum and ascending colon anteriorly (series 2, image 50, series 601, image 54, series 602, image    36). No calcification identified. Trace adjacent fat stranding demonstrated. Vessels: Aorta normal in course and caliber. Lymph nodes:         Retroperitoneal:  No enlarged retroperitoneal lymph nodes. Mesenteric:  No enlarged mesenteric lymph nodes. Pelvic: No enlarged pelvic lymph nodes. Ureters: Normal in course and caliber. No calcifications. Bladder: No wall thickening. Reproductive organs: 8.0 x 5.8 x 7.5 cm left adnexal cyst displacing urinary bladder right and laterally, anteriorly, and inferiorly (series 2, image 68, series 601, image 42, series 602, image 52). Abdominal Wall: Fat identified bilateral inguinal canals. Bones:  No bone lesions. Multilevel disc space narrowing lumbar spine. No post operative changes. Impression       Complex 10.0 x 8.0 x 10.6 cm mass with septated central low attenuation fluid collections as discussed. Mass displaces cecum and ascending colon anteriorly. Differential includes malignancy including appendiceal mucocele/carcinoma, as well as    retroperitoneal sarcoma with central necrotic change. Infection/abscess secondary consideration, minimal presence of adjacent inflammatory change. 8.0 x 5.8 x 7.5 cm left adnexal cysts as discussed, most likely ovarian in etiology. If clinical concern warrants, pelvic sonography may be obtained for further evaluation. Other findings discussed.                      1.Successful drainage and drain placement of right colonic soft tissue mass with central fluid collection. 2.Fluid was aspirated and sent for microbiology and cytology analysis. Additionally a 10 Japanese drain was placed yielding a thick red to yellow fluid with internal yellow debris. HISTORY: Rena Wolf is a Female of 80 years age. DIAGNOSIS:   Right abdominal mass with possible fluid collection vs necrotic tissue        COMPARISON: None available. CT Dose-Length Product (estimate related to radiation exposure from this exam):  541.6  mGy*cm. PROCEDURE:   Following the discussion of the procedure, alternatives, risks versus benefits, informed consent was obtained from the patient. Specifically, risks of after-biopsy pain at the site, rare possibility of excessive hemorrhage, infection, injury to the    adjacent organs were discussed and the patient verbalized understanding. Pre-procedure evaluation confirmed that the patient was an appropriate candidate for conscious    sedation. Adequate sedation was maintained during the entire procedure. Vital signs, pulse    oximetry, and response to verbal commands were monitored and recorded by the nurse throughout the    procedure and the recovery period. Medical information was entered in the medical record including the    medications and dosages used. The patient returned to baseline neurologic and physiologic status    prior to leaving the department. No immediate sedation related complications were noted. Medication    for conscious sedation was administered via IV route. 45 minutes of conscious sedation was    provided. Following universal protocol, patient and site verification was performed with a \"timeout\" prior to the procedure. The patient was placed on the CT table in supine  position and the right lateral abdomen area was prepped and draped in usual sterile fashion.   Using the usual sterile conditions, lidocaine and CT guidance, the fluid collection within soft tissue density    was accessed using a Yueh needle. After confirmation of appropriate localization of the needle, aspiration yielded a thick red to yellow fluid which was sent for microbiology and cytology analysis. Following that an Amplatz wire was placed through the    Yueh sheath into the abscess under CT guidance. The tract was serially dilated with 6, 8, and 10 [place] dilators respectively. Following that a 10 Kis[place]ili drainage catheter was advanced over the wire into the abscess under CT guidance and the anchoring    loop was formed. Catheter was sutured to the skin and and secured with Percufix device. The patient tolerated the procedure well. No immediate complications identified. The patient left the CT suite in supine position to the recovery room in stable    condition. Total local anesthetic used: lidocaine, approximately 15 mL. Estimated blood loss:  None. 1.The previously placed abscess drain in the right lower quadrant appears to be in the periphery of the abscess with loculations now more apparent in the fluid collection. The fluid collection appears to be grossly the same size as prior to drainage. 2.New mural thrombus is noted in the distal aortic arch extending to the lower thoracic aorta which was not seen on prior study. Findings discussed with Dr. Deborah Umanzor   3. Note is again made of an aortic aneurysm involving the ascending aorta, aortic arch, and descending aorta. Based on current images, the ascending aorta appears to be 2 mm larger when compared to study dating February 17, 2021. The ascending aorta now    measures 5.5 cm. Previously the arch measured 5.3 cm. 4.Note is again made of left lower small pleural effusion with bilateral basilar opacities which may represent atelectasis. 5.Note is again made of a large cystic mass in the pelvis.        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        CT Dose-Length Product (estimate related to radiation exposure from this exam):  1386.22  mGy*cm. CT ABDOMEN:        Bibasilar atelectasis with left small pleural effusion. Underlying nodules cannot be excluded. The heart is enlarged. The liver is of normal size and attenuation without focal lesions. The spleen is of normal size and attenuation without focal lesions. Pancreas shows no signs of focal mass or peripancreatic fluid collection. Kidneys are normal in size. There is no    hydronephrosis. No renal mass is identified. Adrenal glands are unremarkable. Note is again made of a thoracoabdominal aortic aneurysm. The ascending aorta now measures 5.5 cm, this is larger when compared to prior study dating 5.3 cm. Note is now made    of a new large mural thrombus involving the descending portion of the aortic arch and extending down to the lower thoracic aorta. This large thrombus was not seen on prior study. No significant retroperitoneal adenopathy or ascites is identified. Again    seen is a cystic lesion involving the proximal ascending aorta which now appears to be more septated than prior. The previously placed drain is now in the periphery of the lesion. CT PELVIS:    A large cystic mass is again seen in the lower pelvis. Review of Systems   Respiratory: Negative. Cardiovascular: Negative. Gastrointestinal: Negative. Neurological: Negative. Psychiatric/Behavioral: Negative. Physical Exam  Cardiovascular:      Heart sounds: Normal heart sounds. No murmur heard. Pulmonary:      Effort: Pulmonary effort is normal. No respiratory distress.       Breath sounds: Normal breath sounds. No wheezing, rhonchi or rales. Abdominal:      General: Abdomen is flat. Bowel sounds are normal. There is no distension. Palpations: Abdomen is soft. There is no mass. Tenderness: There is no abdominal tenderness. There is no guarding. Comments: Abdominal drain showing small amount of serous fluid       Blood pressure (!) 148/81, pulse 63, temperature 97.5 °F (36.4 °C), resp. rate 17, height 5' (1.524 m), weight 164 lb (74.4 kg), SpO2 97 %. .   Lab Results   Component Value Date    WBC 6.2 08/04/2022    HGB 10.1 (L) 08/04/2022    HCT 30.4 (L) 08/04/2022    MCV 83.9 08/04/2022     08/04/2022     Lab Results   Component Value Date/Time     08/04/2022 03:28 AM    K 3.9 08/04/2022 03:28 AM    K 3.2 07/24/2022 05:00 AM     08/04/2022 03:28 AM    CO2 27 08/04/2022 03:28 AM    BUN 14 08/04/2022 03:28 AM    CREATININE 0.56 08/04/2022 03:28 AM    GLUCOSE 110 08/04/2022 03:28 AM    CALCIUM 8.8 08/04/2022 03:28 AM          Repeat CT scan of abdomen to assess abscess  Examination: CT ABDOMEN PELVIS W IV CONTRAST       Indication:   Abdominal abscess , follow-up       Technique: Multiple serial axial images was performed through the abdomen and pelvis utilizing 50 cc of Isovue 300. Images were reconstructed in the axial and coronal and sagittal planes. Comparison: July 29, 2022 1002 hours. Findings:       Within the field-of-view of the basilar again note is made of aneurysmal dilatation of the visualized portion of descending thoracic aorta. Unchanged. There is bibasilar areas of atelectasis, scarring left greater than right. Small left pleural    effusion. The liver, spleen, pancreas, adrenals, kidneys are unremarkable. The gallbladder is contracted. Large and small bowel show no sign of obstruction.    Subjacent to the proximal portion of the ascending colon/cecum is again identified a thick walled heterogeneous enhancing soft tissue collection with scattered air. A drainage catheter is seen dependently within the collection. Collection measures    approximately 6.3 x 4.2 x 5.4. There is some peripheral enhancement and findings are that of a abscess with drainage catheter. The appendix is not visualized. .  No diverticulitis. Small to moderate hiatal hernia. Again note is made of a left cystic adnexal mass measuring 6.6 x 8.1 x 7.7 in the transverse AP and cephalocaudad dimension. Small bilateral renal hernias containing mesenteric fat       No free air. Trace amount of free fluid in the right paracolic gutter and subjacent to the ascending colon. The visualized abdominal aorta is of normal size and caliber. No significant retroperitoneal adenopathy. There is multilevel degenerative changes bridging arthritis of the visualized thoracolumbar spine. There is a grade 1 anterolisthesis of L4 and L5. Loss of height of T7-T8 again noted. Likely old compression fractures           Impression   1. THERE IS A THICK WALLED PERIPHERALLY ENHANCING complex SOFT TISSUE COLLECTION WITH AIR AS WELL AS A DRAINAGE CATHETER SUBJACENT TO THE PROXIMAL PORTION OF THE ASCENDING COLON, CECUM. LIKELY THAT OF THE ABSCESS. IT HAS SHOWN SOME INTERVAL DECREASE IN    SIZE as COMPARED TO THE PRIOR EXAMINATION. 2. LARGE CYSTIC ADNEXAL MASS. UNCHANGED. THE GALLBLADDER NEOPLASM IS NOT EXCLUDED. CONTINUED FURTHER EVALUATION    3. SMALL LEFT PLEURAL EFFUSION.         OTHER FINDINGS DETAILED ABOVE         ASSESSMENT:  PLAN:  Intra-abdominal abscess   meropenem      Abscess persists but apparently somewhat better  Still appears to need to be drained  Changed to meropenem should improve treatment however without drainage extended long-term course of antibiotics with serial follow-up CT scans would be necessary to determine duration of therapy

## 2022-08-10 NOTE — PROGRESS NOTES
Subjective: The patient complains of severe acute on chronic progressive fatigue and  RLQ abdominal pain partially relieved by rest, medications, PT,  OT,     and rest and exacerbated by recent illness -she was initially admitted through the ER at [facility] complaining of abdominal pain for the past 5 to 7 days. She also complained of headache and dizziness. Imaging revealed an abdominal mass felt to be either cancer or an abscess. Imaging was thinking it was more like an abscess. She was admitted to [facility] placed on antibiotics and a drain was placed under interventional radiology's guidance. She was transferred off the rehab unit because of findings on a CT abdomen which showed aneurysm and clots however she was felt to be nonoperative given her age and complex medical status. Her family does not want to pursue aggressive treatment and she is very glad to be back on the rehabilitation unit and is medically stable and ready to participate. She still has her abdominal drain and I discussed with them that interventional radiology who had been under the impression the patient was going to the [facility] clinic for further operative care. Now that she is not we will touch base with general surgery to see if they would be comfortable with this getting it out. .      I am concerned about patients medical complexities and barriers to advancing in rehab goals including  drain for Abd abscess. .        I reviewed current care and plans for further care with other rehab providers including nursing and case management. According to recent nursing note, \"Pt is [place] speaking but does understand some simple english words. .  IV in right forearm would not flush and was painful. Removed and new IV started in left wrist with @ 22 cath. IV meropenem started at hs per order . Drainage tube remains in right abdominal quad to drain abcess. Drainage appears light orange tonight. . small amount noted. . Pt denied any pain tonight. Pt up to void in bathroom with walker and one assist. Denies any dizziness. Pt uses call light appropriately. \". Seems to be doing better now that she is getting the meclizine before breakfast.  I am hoping she is going to be coming off of her Lovenox as her INR is now therapeutic. ROS x10: The patient also complains of severely impaired mobility and activities of daily living. Otherwise no new problems with vision, hearing, nose, mouth, throat, dermal, cardiovascular, GI, , pulmonary, musculoskeletal, psychiatric or neurological. See also Acute Rehab PM&R H&P. Vital signs:  BP (!) 148/81   Pulse 63   Temp 97.5 °F (36.4 °C)   Resp 17   Ht 5' (1.524 m)   Wt 164 lb (74.4 kg)   SpO2 97%   BMI 32.03 kg/m²   I/O:   PO/Intake:  fair PO intake,   Reg diet    Bowel:   continent   Bladder: continent      retention  General:  Patient is well developed,   adequately nourished, and    well kempt. HEENT:    Pupils equal, hearing intact to loud voice, external inspection of ear and nose benign. Inspection of lips, tongue and gums benign    Musculoskeletal: No significant change in strength or tone. All joints stable. Inspection and palpation of digits and nails show no clubbing, cyanosis or inflammatory conditions. Neuro/Psychiatric: Affect: flat but pleasant. Alert and oriented to person, place and situation with  no needded cues. No significant change in deep tendon reflexes or sensation  Lungs:  Diminished, CTA-B. Respiration effort is   normal at rest.     Heart:   S1 = S2,    irreg. Abdomen:  Soft, RLQ-tender, no enlargement of liver or spleen.   Extremities:    lower extremity edema    Skin:   Intact to general survey,    right lower drain site now on IV Merrem every 8 hours    Rehabilitation:  Physical Therapy:   Bed mobility:  Bed mobility  Bridging: Stand by assistance (08/04/22 1014)  Rolling to Left: Modified independent (08/10/22 1147)  Rolling to Right: Modified independent (08/10/22 1147)  Supine to Sit: Modified independent (08/10/22 1147)  Sit to Supine: Supervision (08/08/22 1525)  Bed Mobility Comments: increased time and effort to complete all transitions. Hand over hand assist to complete each activity. (08/04/22 1014)  Bed Mobility  Additional Factors: Head of bed flat; With handrails;Verbal cues; Increased time to complete (08/06/22 1516)  Additional Factors: Head of bed flat; With handrails;Verbal cues; Increased time to complete (08/06/22 1516)  Roll Right  Assistance Level: Supervision (08/06/22 1516)  Supine to Sit  Assistance Level: Supervision (08/06/22 1516)  Scooting  Assistance Level: Supervision (08/06/22 1516)  Transfers:  Transfers  Sit to Stand: Supervision (08/10/22 1147)  Stand to sit: Supervision (08/10/22 1147)  Bed to Chair: Supervision (08/10/22 1147)  Car Transfer: Stand by assistance (Demonstration on safest technique. Required Verbal and tactile cues to follow through.) (08/09/22 1133)  Comment: VCs and demo for handplacement to improve safety and technique with fait follow through. (08/05/22 1444)  Sit to Stand  Assistance Level: Supervision (08/06/22 1516)  Stand to Sit  Assistance Level: Supervision (08/06/22 1516)  Bed To/From Chair  Assistance Level: Supervision (08/06/22 1516)  Stand Pivot  Assistance Level: Contact guard assist (08/04/22 1312)  Gait:   Ambulation  WB Status: wbat (08/10/22 1146)  Ambulation  Surface: level tile;carpet (08/10/22 1146)  Device: Rolling Walker (08/10/22 1146)  Other Apparatus: O2 (08/10/22 1146)  Assistance: Supervision (08/10/22 1146)  Quality of Gait: Assist for management of O2 line. Cues for awareness to line with pt attempting to manage with fair follow through. (08/10/22 1146)  Gait Deviations: Slow Lea;Decreased step length (08/10/22 1146)  Distance: 60ftx2 (08/10/22 1146)  Comments: Cues and assist for O2 line management.  (08/09/22 1108)  Ambulation  Surface: Carpet (08/06/22 1517)  Device: Rolling walker (08/06/22 1517)  Distance: 100' (08/06/22 1517)  Activity: Within Room (08/06/22 1517)  Activity Comments: Reciprocal pattern (08/06/22 1517)  Additional Factors: Set-up; Verbal cues (08/06/22 1517)  Assistance Level: Stand by assist (08/06/22 1517)  Gait Deviations: Slow raji;Decreased step length bilateral;Decreased heel strike right;Decreased heel strike left; Wide base of support (08/06/22 1517)  Skilled Clinical Factors: Patient with AFO's donned Improved gait up to 150' Patient completes all transfers with supervision (08/06/22 1517)  Stairs:  Stairs/Curb  Stairs?: Yes (08/10/22 1146)  Stairs  # Steps : 4 (08/10/22 1146)  Stairs Height: 6\" (08/10/22 1146)  Rails: Bilateral (08/09/22 1114)  Assistance: Stand by assistance (08/10/22 1146)  Stairs  Stair Height: 6'' (08/06/22 1517)  Device: Bilateral handrails (08/06/22 1517)  Number of Stairs: 4 (08/06/22 1517)  Additional Factors: Set-up; Verbal cues; Non-reciprocal going up;Non-reciprocal going down (08/06/22 1517)  Assistance Level: Contact guard assist (08/06/22 1517)  Skilled Clinical Factors: Pt reuiring CGA with first two steps, but then Min A with third and Mod with fourth. Pt only CGA with descending forward. (08/04/22 1525)  W/C mobility:       Occupational Therapy:   Hand Dominance: Right  ADL  Feeding: Setup (08/04/22 1114)  Grooming: Setup; Increased time to complete (08/04/22 1114)  UE Bathing: Stand by assistance; Increased time to complete (08/04/22 1114)  LE Bathing: Moderate assistance; Increased time to complete (08/04/22 1114)  UE Dressing: Contact guard assistance; Increased time to complete (08/04/22 1114)  LE Dressing: Maximum assistance; Increased time to complete (08/04/22 1114)  Toileting: Maximum assistance (08/04/22 1114)  Toilet Transfers  Toilet - Technique: Stand step (08/04/22 1115)  Equipment Used: Grab bars (08/04/22 1115)  Toilet Transfer: Minimal assistance (08/04/22 1115)  Tub Transfers  Tub peripancreatic fluid collection. Kidneys are normal in size. There is no hydronephrosis. No renal mass is identified. Adrenal glands are unremarkable. Note is again made of a thoracoabdominal aortic aneurysm. The ascending aorta now measures 5.5 cm, this is larger when compared to prior study dating 5.3 cm. 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 This large thrombus was not seen on prior study. No significant retroperitoneal adenopathy or ascites is identified. Again seen is a cystic lesion involving the proximal ascending aorta which now appears to be more septated than prior. The previously placed drain is now in the periphery of the lesion. CT PELVIS:  A large cystic mass is again seen in the lower pelvis. CT ABDOMEN PELVIS  : 7/18/2022    Residual intravenous contrast medium from recent CT examination identified. Lungs:  Lung bases clear. Cardiac size enlarged, unchanged. Calcification at level of mitral valve. Small hiatal hernia. Liver:  Normal in size, shape, and attenuation. Bile Ducts:  Normal in caliber. Gallbladder:  No stones or wall thickening. Pancreas:  Normal without masses, cysts, ductal dilatation or calcification. Spleen:  Normal in size without masses or calcifications. No splenules. Kidneys:  Normal in size. No hydronephrosis, masses, or stones. Adrenals:  Normal. Small bowel:  Normal in caliber. See \"peritoneum \". Appendix:  Not visualized. Colon:  Normal in caliber. See \"peritoneum \". Peritoneum:  No free air. A bilobed, 10.0 x 8.0 x 10.6 cm mass having central rounded septated areas of low attenuation, 3.7 x 3.5 x 5.4 cm, and displacing cecum and ascending colon anteriorly (series 2, image 50, series 601, image 54, series 602, image 36). No calcification identified. Trace adjacent fat stranding demonstrated. Vessels: Aorta normal in course and caliber.  Lymph nodes: Retroperitoneal:  No enlarged retroperitoneal lymph nodes. Mesenteric:  No enlarged mesenteric lymph nodes. Pelvic: No enlarged pelvic lymph nodes. Ureters: Normal in course and caliber. No calcifications. Bladder: No wall thickening. Reproductive organs: 8.0 x 5.8 x 7.5 cm left adnexal cyst displacing urinary bladder right and laterally, anteriorly, and inferiorly (series 2, image 68, series 601, image 42, series 602, image 52). Abdominal Wall: Fat identified bilateral inguinal canals. Bones:  No bone lesions. Multilevel disc space narrowing lumbar spine. No post operative changes. Complex 10.0 x 8.0 x 10.6 cm mass with septated central low attenuation fluid collections as discussed. Mass displaces cecum and ascending colon anteriorly. Differential includes malignancy including appendiceal mucocele/carcinoma, as well as retroperitoneal sarcoma with central necrotic change. Infection/abscess secondary consideration, minimal presence of adjacent inflammatory change. 8.0 x 5.8 x 7.5 cm left adnexal cysts as discussed, most likely ovarian in etiology. If clinical concern warrants, pelvic sonography may be obtained for further evaluation. Other findings discussed. CT Head   7/18/2022  1. Age-appropriate cortical atrophy and periventricular microangiopathy. When compared to previous study there has been no significant interval change. 2. No acute hemorrhage or extra-axial fluid collection            CT ABDOMEN PELVIS   7/18/2022     1. In the right lower quadrant of the abdomen, pericolic abscesses versus pericolic mass is seen. This is thought more likely abscess. 2. A cystic mass is again seen in the pelvis and is likely in the ovary. It is unchanged from previous examination The patient will be given oral contrast and rescanned to evaluate the right lower quadrant. Previous extensive, complex labs, notes and diagnostics reviewed and analyzed.      ALLERGIES:    Allergies as of 08/03/2022 - Fully Reviewed 08/03/2022   Allergen Reaction Noted    Latex  07/19/2017    Tape [adhesive tape]  06/28/2016      (please also verify by checking MAR)       I reviewed her [facility] prescription monitoring service data sheets in hopes of eliminating polypharmacy and weaning to the lowest effective dose of pain medications and eliminating the concomitant use of benzodiazepines. I see no medications of concern. I see no habits of combining sedatives and narcotics. 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 Functional and medical status reassessed regarding patients ability to participate in therapies and patient found to be able to participate in acute intensive comprehensive inpatient rehabilitation program including PT/OT to improve balance, ambulation, ADLs, and to improve the P/AROM. Therapeutic modifications regarding activities in therapies, place, amount of time per day and intensity of therapy made daily. In bed therapies or bedside therapies prn. Bowel and Bladder dysfunction nocturia with severe urinary retention, Neurogenic bowel and bladder:  frequent toileting, ambulate to bathroom with assistance, check post void residuals. Check for C.difficile x1 if >2 loose stools in 24 hours, continue bowel & bladder program.  Monitor bowel and bladder function. Lactinex 2 PO every AC. MOM prn, Brown Bomb prn, Glycerin suppository prn, enema prn. Encourage therapy and nursing to co-treat and problem solve re continence. Recent UA was negative. Severe right Abd  pain as well as generalized OA pain: reassess pain every shift and prior to and after each therapy session, give prn Tylenol and consider scheduled Tylenol, modalities prn in therapy, masage, Lidoderm, K-pad prn. Consider scheduled AM pain meds.   Skin healing right abd and breakdown risk:  continue pressure relief program. Daily skin exams and reports from nursing. Fatigue due to nutritional and hydration deficiency: Add and titrate vitamin B12 vitamin D and CoQ10 continue to monitor I&Os, calorie counts prn, dietary consult prn. Add healthy snack at night. Acute episodic insomnia with situational adjustment disorder:  prn Ambien, monitor for day time sedation. Falls risk elevated:  patient to use call light to get nursing assistance to get up, bed and chair alarm. Elevated DVT risk: progressive activities in PT, continue prophylaxis PORTILLO hose, elevation and  Lovenox to Coumadin . Complex discharge planning: Discharge August 17, 2022 to home alone with help from her son with home health care and video monitor. We are suggesting that patient should not be home alone for more than a few minutes. Apparently patient and family do not have that option. We are hoping that patient can go stay with one of her children though she is quite against the idea. He is competent to make her own decisions. Weekly team meeting every Monday to re-assess progress towards goals, discuss and address social, psychological and medical comorbidities and to address difficulties they may be having progressing in therapy. Patient and family education is in progress. The patient is to follow-up with their family physician after discharge. Complex Active General Medical Issues that complicate care Assess & Plan:     A-fib,   Essential hypertension, Acute on chronic combined systolic (congestive) and diastolic (congestive) heart failure-Acute rehab to monitor heart rate and rhythm with the option of telemetry and the effects of chronotropic medication with respect to increasing physical activity and exercise in PT, OT, ADLs with medication titration to lowest effective dosing. Continue blood signs every shift focusing on heart rate, rhythm and blood pressure checks with orthostatic checks-monitoring the effect of exercise, therapy and posture. Consult hospitalist for backup medical and adjust/add medications (Lovenox transitioning to Coumadin patient had been on Xarelto, Coreg). Monitor heart rate and blood pressure as well as medications effects on vital signs before during and after therapy with especial focus on preventing orthostasis and falls risk. Hyponatremia-recheck BMP avoid excessive water  Anemia-Monitor vital signs monitor for orthostasis and tachycardia, check H&H prn. Vitamin B12 and iron-dose iron with food to prevent GI upset. Monitor for constipation. Monitor stools for blood. Hypothyroid-  Synthroid and titrate dosing. Follow vital signs, recheck TSH and thyroid studies as outpatient. 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 Osteoarthritis of lumbar spine with myelopathy-her extremity strengthening    Depression-emotional support provided daily, vitamin B12, encourage participation in rehabilitation support group and recreational therapy, adjust/add medications (B12, Celexa)  Abdominal abscess versus cancer-drain still in place pending cytology-patient is on IV Merrem every 8 hours  SP cultures-Pt has right abd abscess/mass. S/p drainage. SHe also has enlarging Thoraci Aortic   Aneurysm with large mural thrombus burden. Per GI surgery Dr. Lorri Ritter he would like to keep the drain is long as possible. Do not remove the drain. GERD-Elevate head of bed after meals, monitor stools for blood, lowest effective dose of PPI, consider Tums. Yeast rash and immunosuppression-Micatin powder and Floranex   AA now at 5.5cm up from 5.3cm but now there is a new large mural thrombus involving the descending portion of the aortic arch and extending how to the lower thoracic aorta along with large cystic mass. The thrombus was not seen on previous study.  Patient had been on xarelto and now on Lovenox transitioning to Coumadin. . Given her abdominal pathology and large thrombus however patient due to her advanced age and high risk for any surgical intervention she is electing for conservative care. Dizziness and gait ataxia-She is getting dizzy especially if he eats she does not get her Antivert in the morning and therapy starts before she can get the Antivert therefore I have asked my shift to give and schedule basis before breakfast and before shift change. Review and simplify inpatient and outpatient medications and dosing times. Transition to self medication program if able. Patient is to have IV Merrem every 8 hours.         Electronically signed by Maria Eugenia Arzate DO on 8/5/22 at 4:51 AM EDT       Taryn Aburto D.O., PM&R     Attending    [address]

## 2022-08-10 NOTE — CARE COORDINATION
LSW completed the request for an on-site interpretor via [email or web address] and called [employer] again. However, when following the prompt, LSW was told again to only email [email or web address] for an onsite interpretor. LSW will follow up later today again. Electronically signed by KIN Laboy LSW on 8/10/2022 at 10:43 AM    LSW heard back from Kayla Phelps with [employer] and she stated that LSW would have to contact [facility] via [phone] since an  is needed as soon as possible. LSW called [facility] and spoke to Sherry Wilson. LSW explained the situation to Sherry Wilson regarding pt's specific dialect. Sherry Wilson stated that she would request a [facility] [place]  and would specify the location of where pt is from [facility]. Sherry Wilson is requesting for the  to be at the rehab starting as soon as possible today and would stay until 8/17.  Electronically signed by KIN Laboy LSW on 8/10/2022 at 12:04 PM

## 2022-08-10 NOTE — PROGRESS NOTES
Physical Therapy Rehab Treatment Note  Facility/Department: [facility] REHAB  Room: [place] [id number]       NAME: Lucie Torres  : 1932 (80 y.o.)  MRN: [id number]  CODE STATUS: DNR-CC    Date of Service: 8/10/2022       Restrictions:  Restrictions/Precautions: Fall Risk       SUBJECTIVE:   Subjective: \"I'm ok\"    Pain  Pain: \"No pain\"      OBJECTIVE:         Transfers  Sit to Stand: Supervision  Stand to sit: Supervision    Ambulation  WB Status: wbat  Ambulation  Surface: level tile;carpet  Device: Rolling Walker  Other Apparatus: O2  Assistance: Supervision  Quality of Gait: Assist for management of O2 line. Cues for awareness to line with pt attempting to manage with fair follow through. Gait Deviations: Slow Lea;Decreased step length  Distance: 90ft        PT Exercises  A/AROM Exercises: seated LAQ x20, march x20, hip add with ball x20, hip abd RTB x20, hamstring curl RTB x20           ASSESSMENT/PROGRESS TOWARDS GOALS:   Assessment: Fatigued with increased distance    Goals:  Long Term Goals  Long term goal 1: Pt to complete bed mobility with indep  Long term goal 2: Pt to complete transfers with indep  Long term goal 3: Pt to ambulate 50ft with LRD and indep  Long term goal 4: Pt to manage 2 steps with B HR and Supervision  Long term goal 5: Pt to complete TUG within 20sec to demonstrate improved functional mobility/balance  Patient Goals   Patient goals : does not state, family wishes pt to regain her indep    PLAN OF CARE/Safety:   Plan Comment: Cont per POC.       Therapy Time:   Individual   Time In 1500   Time Out 1530   Minutes 30     Minutes:  Transfer/Bed mobility trainin  Gait training:10  Neuro re education:0  Therapeutic ex:15      Wilbert Escudero PTA, 08/10/22 at 3:26 PM

## 2022-08-10 NOTE — PROGRESS NOTES
OCCUPATIONAL THERAPY  INPATIENT REHAB TREATMENT NOTE  [facility]      NAME: Kaleta Oppenheim  : 1932 (80 y.o.)  MRN: [id number]  CODE STATUS: DNR-CC  [facility]    Date of Service: 8/10/2022    Referring Physician: Dr. Sara Christianson Diagnosis: Impaired mobility and ADL's due to Charcot [place] Mark[place] Tooth Neuropathy    Restrictions    Patient's date of birth confirmed: Yes    SAFETY:  Safety Devices  Type of devices: All fall risk precautions in place    SUBJECTIVE:   No pain reported    OBJECTIVE:    Pt presented on toilet with call light on- requesting assistance to finish and return to bed      Grooming/Oral Hygiene  Assistance Level: Supervision  Skilled Clinical Factors: wash hands in standing  Toileting  Assistance Level: Minimal assistance  Skilled Clinical Factors: [place] with clothing management, pt reporting difficulty due to drain, [place] hygiene  Toilet Transfers  Technique: Stand pivot  Equipment: Grab bars;Standard toilet  Additional Factors: Verbal cues  Assistance Level: Supervision  Skilled Clinical Factors: ww    Functional Mobility  Device: Rolling walker  Activity: To/From bathroom  Assistance Level: Stand by assist  Sit to Stand  Assistance Level: Stand by assist  Stand to Sit  Assistance Level: Stand by assist    Education:   ADL techniques, drain management    ASSESSMENT:   Good participation with self-care     PLAN OF CARE:  Strengthening, Balance training, Functional mobility training, Neuromuscular re-education, Endurance training, Self-Care / ADL, Cognitive/Perceptual training, Safety education & training, Home management training, Patient/Caregiver education & training  Continue per OT POC for planned d/c on 22    Patient goals : \"to be able to wash my back and my feet. \"  Time Frame for Long term goals : 2 weeks  Long Term Goal 1: Pt within two weeks of evaluation will demonstrate progress to goals to address areas as stated below to increase functional independence for return to PLOF  Long Term Goal 2: Pt will increase independence with ADL Pt will increase independence with ADL transfers  Long Term Goal 3: Pt will increase bilateral UE strength to increase ease of ADL transfers    Therapy Time:   Individual Group Co-Treat   Time In 1027       Time Out 1036         Minutes 9         ADL/IADL trainin minutes     Electronically signed by:    Laisha Pitts OT,   8/10/2022, 11:09 AM

## 2022-08-10 NOTE — PLAN OF CARE
Problem: Discharge Planning  Goal: Discharge to home or other facility with appropriate resources  8/10/2022 1137 by Sameer Neil RN  Outcome: Progressing  8/10/2022 0334 by Shawn Lipscomb. Jelena Fournier RN  Outcome: Progressing     Problem: ABCDS Injury Assessment  Goal: Absence of physical injury  8/10/2022 1137 by Sameer Neil RN  Outcome: Progressing  8/10/2022 0334 by Shawn Lipscomb. Jelena Fournier RN  Outcome: Progressing     Problem: Skin/Tissue Integrity  Goal: Absence of new skin breakdown  Description: 1. Monitor for areas of redness and/or skin breakdown  2. Assess vascular access sites hourly  3. Every 4-6 hours minimum:  Change oxygen saturation probe site  4. Every 4-6 hours:  If on nasal continuous positive airway pressure, respiratory therapy assess nares and determine need for appliance change or resting period. 8/10/2022 1137 by Sameer Neil RN  Outcome: Progressing  8/10/2022 0334 by Jesus Fournier RN  Outcome: Progressing     Problem: Chronic Conditions and Co-morbidities  Goal: Patient's chronic conditions and co-morbidity symptoms are monitored and maintained or improved  8/10/2022 1137 by Sameer Neil RN  Outcome: Progressing  8/10/2022 0334 by Shawn Lipscomb.  Jelena Fournier RN  Outcome: Progressing

## 2022-08-10 NOTE — PROGRESS NOTES
Assessment completed. A&O x4. Denies pain at this time. Denies dizziness. RUQ drain drsg is clean, dry and intact. Emptied 40 ml of serosanguinous drainage from bag. INR 2.1 today. In chair with alarm activated. Call light in reach.  Electronically signed by Susana Park LPN on 3/17/5095 at 8:03 PM

## 2022-08-11 LAB
GLUCOSE BLD-MCNC: 95 MG/DL (ref 70–99)
INR BLD: 2.2
PERFORMED ON: NORMAL
PROTHROMBIN TIME: 24.1 SEC (ref 12.3–14.9)

## 2022-08-11 PROCEDURE — 97110 THERAPEUTIC EXERCISES: CPT

## 2022-08-11 PROCEDURE — 36415 COLL VENOUS BLD VENIPUNCTURE: CPT

## 2022-08-11 PROCEDURE — 6370000000 HC RX 637 (ALT 250 FOR IP): Performed by: PHYSICAL MEDICINE & REHABILITATION

## 2022-08-11 PROCEDURE — 99232 SBSQ HOSP IP/OBS MODERATE 35: CPT | Performed by: INTERNAL MEDICINE

## 2022-08-11 PROCEDURE — 2580000003 HC RX 258: Performed by: INTERNAL MEDICINE

## 2022-08-11 PROCEDURE — 85610 PROTHROMBIN TIME: CPT

## 2022-08-11 PROCEDURE — 6360000002 HC RX W HCPCS: Performed by: INTERNAL MEDICINE

## 2022-08-11 PROCEDURE — 97535 SELF CARE MNGMENT TRAINING: CPT

## 2022-08-11 PROCEDURE — 99232 SBSQ HOSP IP/OBS MODERATE 35: CPT | Performed by: PHYSICAL MEDICINE & REHABILITATION

## 2022-08-11 PROCEDURE — 97116 GAIT TRAINING THERAPY: CPT

## 2022-08-11 PROCEDURE — 6370000000 HC RX 637 (ALT 250 FOR IP): Performed by: INTERNAL MEDICINE

## 2022-08-11 PROCEDURE — 2700000000 HC OXYGEN THERAPY PER DAY

## 2022-08-11 PROCEDURE — 1180000000 HC REHAB R&B

## 2022-08-11 RX ORDER — WARFARIN SODIUM 5 MG/1
7.5 TABLET ORAL
Status: COMPLETED | OUTPATIENT
Start: 2022-08-11 | End: 2022-08-11

## 2022-08-11 RX ADMIN — MICONAZOLE NITRATE: 2 POWDER TOPICAL at 23:45

## 2022-08-11 RX ADMIN — LACTOBACILLUS TAB 2 TABLET: TAB at 14:01

## 2022-08-11 RX ADMIN — PANTOPRAZOLE SODIUM 40 MG: 40 TABLET, DELAYED RELEASE ORAL at 05:42

## 2022-08-11 RX ADMIN — MICONAZOLE NITRATE: 2 POWDER TOPICAL at 08:54

## 2022-08-11 RX ADMIN — MEROPENEM 1000 MG: 1 INJECTION, POWDER, FOR SOLUTION INTRAVENOUS at 21:44

## 2022-08-11 RX ADMIN — LACTOBACILLUS TAB 2 TABLET: TAB at 21:42

## 2022-08-11 RX ADMIN — Medication 10 ML: at 22:17

## 2022-08-11 RX ADMIN — MEROPENEM 1000 MG: 1 INJECTION, POWDER, FOR SOLUTION INTRAVENOUS at 12:19

## 2022-08-11 RX ADMIN — CITALOPRAM HYDROBROMIDE 20 MG: 10 TABLET ORAL at 08:56

## 2022-08-11 RX ADMIN — MICONAZOLE NITRATE: 2 POWDER TOPICAL at 08:59

## 2022-08-11 RX ADMIN — Medication 2000 UNITS: at 17:07

## 2022-08-11 RX ADMIN — WARFARIN SODIUM 7.5 MG: 5 TABLET ORAL at 17:07

## 2022-08-11 RX ADMIN — CLOTRIMAZOLE: 1 CREAM TOPICAL at 08:59

## 2022-08-11 RX ADMIN — LEVOTHYROXINE SODIUM 88 MCG: 88 TABLET ORAL at 08:57

## 2022-08-11 RX ADMIN — HYDROCORTISONE: 25 CREAM TOPICAL at 08:58

## 2022-08-11 RX ADMIN — CARVEDILOL 6.25 MG: 6.25 TABLET, FILM COATED ORAL at 17:06

## 2022-08-11 RX ADMIN — CARVEDILOL 6.25 MG: 6.25 TABLET, FILM COATED ORAL at 08:57

## 2022-08-11 RX ADMIN — LACTOBACILLUS TAB 2 TABLET: TAB at 08:56

## 2022-08-11 RX ADMIN — FUROSEMIDE 20 MG: 20 TABLET ORAL at 08:56

## 2022-08-11 RX ADMIN — MEROPENEM 1000 MG: 1 INJECTION, POWDER, FOR SOLUTION INTRAVENOUS at 05:23

## 2022-08-11 RX ADMIN — MECLIZINE 12.5 MG: 12.5 TABLET ORAL at 05:42

## 2022-08-11 RX ADMIN — Medication 100 MG: at 17:06

## 2022-08-11 RX ADMIN — Medication 10 ML: at 09:00

## 2022-08-11 ASSESSMENT — ENCOUNTER SYMPTOMS
RESPIRATORY NEGATIVE: 1
GASTROINTESTINAL NEGATIVE: 1

## 2022-08-11 ASSESSMENT — PAIN SCALES - GENERAL: PAINLEVEL_OUTOF10: 0

## 2022-08-11 NOTE — PROGRESS NOTES
Physical Therapy Rehab Treatment Note  Facility/Department: [facility]  [facility]       NAME: Roxana Anthony  : 1932 (80 y.o.)  MRN: [id number]  CODE STATUS: DNR-CC    Date of Service: 2022       Restrictions:  Restrictions/Precautions: Fall Risk  Position Activity Restriction  Other position/activity restrictions: Abdominal drain    SUBJECTIVE: patient without new reports   Pain  Denies       OBJECTIVE:   Transfers  Sit to Stand: Supervision;Minimal Assistance  Stand to sit: Supervision  Bed to Chair: Supervision  Comment: Patient requires min A to stand from chair without arm rest    Ambulation  Surface: level tile;carpet  Device: Rolling Walker  Other Apparatus: O2  Assistance: Supervision  Distance: 100 feet     PT Exercises  Exercise Treatment: STS x5 from chair  A/AROM Exercises: seated: LAQ, march, hip add x20 ea  Static Standing Balance Exercises: static standing ball lifts, push/pull x5 ea      ASSESSMENT/PROGRESS TOWARDS GOALS: patient continues to fatigue quickly. Requires verbal cues for safety with transfers.         Goals:  Long Term Goals  Long term goal 1: Pt to complete bed mobility with indep  Long term goal 2: Pt to complete transfers with indep  Long term goal 3: Pt to ambulate 50ft with LRD and indep  Long term goal 4: Pt to manage 2 steps with B HR and Supervision  Long term goal 5: Pt to complete TUG within 20sec to demonstrate improved functional mobility/balance    PLAN OF CARE/Safety: all safety precautions in place          Therapy Time:   Individual   Time In 1500   Time Out 1530   Minutes 30     Minutes:30  Gait training:15  Therapeutic ex:15      B[place] Walker PTA, 22 at 4:22 PM

## 2022-08-11 NOTE — PROGRESS NOTES
Physical Therapy Rehab Treatment Note  Facility/Department: [facility]  Room: [place] [address]       NAME: Mandy Correia  : 1932 ([address] [facility]  MRN: [id number]  CODE STATUS: DNR-CC    Date of Service: 2022       Restrictions:  Restrictions/Precautions: Fall Risk  Position Activity Restriction  Other position/activity restrictions: Abdominal drain       SUBJECTIVE: patient without new reports       Pain  Patient denies pain       OBJECTIVE:   Transfers  Sit to Stand: Supervision;Minimal Assistance  Stand to sit: Supervision  Bed to Chair: Supervision  Comment: Patient requires min A to stand from chair without arm rest    Ambulation  Surface: level tile;carpet  Device: Rolling Walker  Other Apparatus: O2  Assistance: Supervision  Distance: 60 feet x4     PT Exercises  Exercise Treatment: STS x5 from chair, , march x20 ea   A/AROM Exercises: Standing march 1 minute  Static Standing Balance Exercises: standing at ww alt arm reach  Dynamic Standing Balance Exercises: single steps forward/lateral with ww x10 B     ASSESSMENT/PROGRESS TOWARDS GOALS: Patient requires increased assistance and multiple attempts to stand from low surfaces. Fatigues quickly during standing activity.         Goals:  Long Term Goals  Long term goal 1: Pt to complete bed mobility with indep  Long term goal 2: Pt to complete transfers with indep  Long term goal 3: Pt to ambulate 50ft with LRD and indep  Long term goal 4: Pt to manage 2 steps with B HR and Supervision  Long term goal 5: Pt to complete TUG within 20sec to demonstrate improved functional mobility/balance    PLAN OF CARE/Safety: all safety precautions in place          Therapy Time:   Individual   Time In 1100   Time Out 1200   Minutes 60     Minutes:60    Gait trainin  Neuro re education:15  Therapeutic ex:10  Neuro: 10       Crow Evans PTA, 22 at 12:21 PM

## 2022-08-11 NOTE — PROGRESS NOTES
Infectious Disease     Patient Name: Harleen Valencia  Date: 8/11/2022  YOB: 1932  Medical Record Number: [id number]      Abdominal abscess          Patient presented 7/22 abdominal pain CT scan showing mass abscess in right lower quadrant had drainage catheter placed at that time was initially on Zosyn and switched to Rocephin  Abscess first found on CT scan 7/18/2022 and drainage of abscess performed on 7/21/2022    Catheter stopped working but not removed she was reevaluated by interventional radiology on 72/9 new catheter was not placed  CT scan showed enlarging aortic aneurysm with thrombus she was placed on a heparin drip which precluded the placement of the catheter    There was a plan to transfer patient to tertiary care but this did not happen he was transferred to rehab        CT of the Abdomen and Pelvis without intravenous contrast medium       History:  Approximate 1 week history of abdominal pain       Technical Factors:       CT imaging of the abdomen and pelvis were obtained and formatted as 5 mm contiguous axial images from the domes of the diaphragm to the symphysis pubis. Sagittal and coronal reconstructions were also obtained. Oral contrast medium: Barium sulfate, 450 mL. Intravenous contrast medium:  None. Comparison:  CT abdomen pelvis, July 18, 2022, 1444 hours, February 8, 2021. Findings:       Residual intravenous contrast medium from recent CT examination identified. Lungs:  Lung bases clear. Cardiac size enlarged, unchanged. Calcification at level of mitral valve. Small hiatal hernia. Liver:  Normal in size, shape, and attenuation. Bile Ducts:  Normal in caliber. Gallbladder:  No stones or wall thickening. Pancreas:  Normal without masses, cysts, ductal dilatation or calcification. Spleen:  Normal in size without masses or calcifications. No splenules. Kidneys:  Normal in size.   No hydronephrosis, masses, or stones. Adrenals:  Normal.        Small bowel:  Normal in caliber. See \"peritoneum \". Appendix:  Not visualized. Colon:  Normal in caliber. See \"peritoneum \". Peritoneum:  No free air. A bilobed, 10.0 x 8.0 x 10.6 cm mass having central rounded septated areas of low attenuation, 3.7 x 3.5 x 5.4 cm, and displacing cecum and ascending colon anteriorly (series 2, image 50, series 601, image 54, series 602, image    36). No calcification identified. Trace adjacent fat stranding demonstrated. Vessels: Aorta normal in course and caliber. Lymph nodes:         Retroperitoneal:  No enlarged retroperitoneal lymph nodes. Mesenteric:  No enlarged mesenteric lymph nodes. Pelvic: No enlarged pelvic lymph nodes. Ureters: Normal in course and caliber. No calcifications. Bladder: No wall thickening. Reproductive organs: 8.0 x 5.8 x 7.5 cm left adnexal cyst displacing urinary bladder right and laterally, anteriorly, and inferiorly (series 2, image 68, series 601, image 42, series 602, image 52). Abdominal Wall: Fat identified bilateral inguinal canals. Bones:  No bone lesions. Multilevel disc space narrowing lumbar spine. No post operative changes. Impression       Complex 10.0 x 8.0 x 10.6 cm mass with septated central low attenuation fluid collections as discussed. Mass displaces cecum and ascending colon anteriorly. Differential includes malignancy including appendiceal mucocele/carcinoma, as well as    retroperitoneal sarcoma with central necrotic change. Infection/abscess secondary consideration, minimal presence of adjacent inflammatory change. 8.0 x 5.8 x 7.5 cm left adnexal cysts as discussed, most likely ovarian in etiology. If clinical concern warrants, pelvic sonography may be obtained for further evaluation. Other findings discussed.                      1.Successful drainage and drain placement of right colonic soft tissue mass with central fluid collection. 2.Fluid was aspirated and sent for microbiology and cytology analysis. Additionally a 10 Portuguese drain was placed yielding a thick red to yellow fluid with internal yellow debris. HISTORY: Gilberto Zaidi is a Female of 80 years age. DIAGNOSIS:   Right abdominal mass with possible fluid collection vs necrotic tissue        COMPARISON: None available. CT Dose-Length Product (estimate related to radiation exposure from this exam):  541.6  mGy*cm. PROCEDURE:   Following the discussion of the procedure, alternatives, risks versus benefits, informed consent was obtained from the patient. Specifically, risks of after-biopsy pain at the site, rare possibility of excessive hemorrhage, infection, injury to the    adjacent organs were discussed and the patient verbalized understanding. Pre-procedure evaluation confirmed that the patient was an appropriate candidate for conscious    sedation. Adequate sedation was maintained during the entire procedure. Vital signs, pulse    oximetry, and response to verbal commands were monitored and recorded by the nurse throughout the    procedure and the recovery period. Medical information was entered in the medical record including the    medications and dosages used. The patient returned to baseline neurologic and physiologic status    prior to leaving the department. No immediate sedation related complications were noted. Medication    for conscious sedation was administered via IV route. 45 minutes of conscious sedation was    provided. Following universal protocol, patient and site verification was performed with a \"timeout\" prior to the procedure. The patient was placed on the CT table in supine  position and the right lateral abdomen area was prepped and draped in usual sterile fashion.   Using the usual sterile conditions, lidocaine and CT guidance, the fluid collection within soft tissue density    was accessed using a Yueh needle. After confirmation of appropriate localization of the needle, aspiration yielded a thick red to yellow fluid which was sent for microbiology and cytology analysis. Following that an Amplatz wire was placed through the    Yueh sheath into the abscess under CT guidance. The tract was serially dilated with 6, 8, and 10 [place] dilators respectively. Following that a 10 Romansh drainage catheter was advanced over the wire into the abscess under CT guidance and the anchoring    loop was formed. Catheter was sutured to the skin and and secured with Percufix device. The patient tolerated the procedure well. No immediate complications identified. The patient left the CT suite in supine position to the recovery room in stable    condition. Total local anesthetic used: lidocaine, approximately 15 mL. Estimated blood loss:  None. 1.The previously placed abscess drain in the right lower quadrant appears to be in the periphery of the abscess with loculations now more apparent in the fluid collection. The fluid collection appears to be grossly the same size as prior to drainage. 2.New mural thrombus is noted in the distal aortic arch extending to the lower thoracic aorta which was not seen on prior study. Findings discussed with Dr. Keyonna Rivero   3. Note is again made of an aortic aneurysm involving the ascending aorta, aortic arch, and descending aorta. Based on current images, the ascending aorta appears to be 2 mm larger when compared to study dating February 17, 2021. The ascending aorta now    measures 5.5 cm. Previously the arch measured 5.3 cm. 4.Note is again made of left lower small pleural effusion with bilateral basilar opacities which may represent atelectasis. 5.Note is again made of a large cystic mass in the pelvis.        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        CT Dose-Length Product (estimate related to radiation exposure from this exam):  1386.22  mGy*cm. CT ABDOMEN:        Bibasilar atelectasis with left small pleural effusion. Underlying nodules cannot be excluded. The heart is enlarged. The liver is of normal size and attenuation without focal lesions. The spleen is of normal size and attenuation without focal lesions. Pancreas shows no signs of focal mass or peripancreatic fluid collection. Kidneys are normal in size. There is no    hydronephrosis. No renal mass is identified. Adrenal glands are unremarkable. Note is again made of a thoracoabdominal aortic aneurysm. The ascending aorta now measures 5.5 cm, this is larger when compared to prior study dating 5.3 cm. Note is now made    of a new large mural thrombus involving the descending portion of the aortic arch and extending down to the lower thoracic aorta. This large thrombus was not seen on prior study. No significant retroperitoneal adenopathy or ascites is identified. Again    seen is a cystic lesion involving the proximal ascending aorta which now appears to be more septated than prior. The previously placed drain is now in the periphery of the lesion. CT PELVIS:    A large cystic mass is again seen in the lower pelvis. Review of Systems   Constitutional:  Negative for chills, diaphoresis, fatigue and fever. Respiratory: Negative. Cardiovascular: Negative. Gastrointestinal: Negative. Neurological: Negative. Psychiatric/Behavioral: Negative. Physical Exam  Cardiovascular:      Heart sounds: Normal heart sounds. No murmur heard.   Pulmonary:      Effort: Pulmonary effort enhancing soft tissue collection with scattered air. A drainage catheter is seen dependently within the collection. Collection measures    approximately 6.3 x 4.2 x 5.4. There is some peripheral enhancement and findings are that of a abscess with drainage catheter. The appendix is not visualized. .  No diverticulitis. Small to moderate hiatal hernia. Again note is made of a left cystic adnexal mass measuring 6.6 x 8.1 x 7.7 in the transverse AP and cephalocaudad dimension. Small bilateral renal hernias containing mesenteric fat       No free air. Trace amount of free fluid in the right paracolic gutter and subjacent to the ascending colon. The visualized abdominal aorta is of normal size and caliber. No significant retroperitoneal adenopathy. There is multilevel degenerative changes bridging arthritis of the visualized thoracolumbar spine. There is a grade 1 anterolisthesis of L4 and L5. Loss of height of T7-T8 again noted. Likely old compression fractures           Impression   1. THERE IS A THICK WALLED PERIPHERALLY ENHANCING complex SOFT TISSUE COLLECTION WITH AIR AS WELL AS A DRAINAGE CATHETER SUBJACENT TO THE PROXIMAL PORTION OF THE ASCENDING COLON, CECUM. LIKELY THAT OF THE ABSCESS. IT HAS SHOWN SOME INTERVAL DECREASE IN    SIZE as COMPARED TO THE PRIOR EXAMINATION. 2. LARGE CYSTIC ADNEXAL MASS. UNCHANGED. THE GALLBLADDER NEOPLASM IS NOT EXCLUDED. CONTINUED FURTHER EVALUATION    3. SMALL LEFT PLEURAL EFFUSION.         OTHER FINDINGS DETAILED ABOVE         ASSESSMENT:  PLAN:  Intra-abdominal abscess      Changed to meropenem

## 2022-08-11 NOTE — PROGRESS NOTES
OCCUPATIONAL THERAPY  INPATIENT REHAB TREATMENT NOTE  [facility]      NAME: Talya Fontana  : 1932 (80 y.o.)  MRN: [id number]  CODE STATUS: DNR-CC  Room: R250/R250-01    Date of Service: 2022    Referring Physician: Dr. 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 \" - feeling better     Pain at start of treatment:  Yes    Pain at end of treatment:   Yes    Pain Location: stomach  Pain Descriptors: unable to rate  Nursing notified: no  Intervention: None    COGNITION:  Orientation  Overall Orientation Status: Within Functional Limits  Cognition  Overall Cognitive Status: Exceptions  Cognition Comment: Comp Mod I, expression Mod I, social Ind, problem Sup, Memory Sup    OBJECTIVE:    Grooming:  MOD I    Instrumental ADL's  Instrumental ADLs: Yes  Health Management  Health Management Level of Assistance: Supervision  Health Management:   Medication Management Simulation Activity:  Patient completed simulation activity utilizing 7 provided medication bottles with written instruction to place a total of 74 beads into the provided weekly medication organizer. Patient with unable to open medication bottles. Patient with MOD difficulty opening organizer boxes. Patient placed a total of 75 beads into organizer with step by step verbal cues and a total of 73 being correct. Patient with MAX difficulty manipulating beads. Patient able to securely close 0/7 caps on medication bottles. Therapist graded activity by verbal cues and demonstration 2° language barrier with patient being [place] speaking.     ASSESSMENT: Patient worked at a slow and steady pace. Activity Tolerance: Patient tolerated treatment well      PLAN OF CARE:  Strengthening, Balance training, Functional mobility training, Neuromuscular re-education, Endurance training, Self-Care / ADL, Cognitive/Perceptual training, Safety education & training, Home management training, Patient/Caregiver education & training    Continue per OT POC for planned d/c on 22    Patient goals : \"to be able to wash my back and my feet. \"  Time Frame for Long term goals : 2 weeks  Long Term Goal 1: Pt within two weeks of evaluation will demonstrate progress to goals to address areas as stated below to increase functional independence for return to PLOF  Long Term Goal 2: Pt will increase independence with ADL Pt will increase independence with ADL transfers  Long Term Goal 3: Pt will increase bilateral UE strength to increase ease of ADL transfers        Therapy Time:   Individual Group Co-Treat   Time In 1030       Time Out 1100         Minutes 30                   ADL/IADL trainin minutes     Electronically signed by:    PRUDENCE Kline,   2022, 11:22 AM

## 2022-08-11 NOTE — PROGRESS NOTES
OCCUPATIONAL THERAPY  INPATIENT REHAB TREATMENT NOTE  [facility]      NAME: Roxana Anthony  : 1932 (80 y.o.)  MRN: [id number]  CODE STATUS: DNR-CC  Room: R250/R250-01    Date of Service: 2022    Referring Physician: Dr. Piotr Escudero Diagnosis: Impaired mobility and ADL's due to Charcot [place] Shown Tooth Neuropathy    Restrictions  Restrictions/Precautions  Restrictions/Precautions: Fall Risk  Required Braces or Orthoses?: No     Patient's date of birth confirmed: Yes    SAFETY:  Safety Devices  Safety Devices in place: Yes  Type of devices: All fall risk precautions in place    SUBJECTIVE:  Subjective: \" Thank you girl. \"     Pain at start of treatment:  No 0/10    Pain at end of treatment:   No 0/10    COGNITION:  Orientation  Overall Orientation Status: Within Functional Limits  Cognition  Overall Cognitive Status: Exceptions  Cognition Comment: Comp Mod I, expression Mod I, social Ind, problem Sup, Memory Sup    OBJECTIVE:    Transfer:  Patient completed supine -> sit at MOD I. Patient completed stand -> sit at Supervision. Grooming/Oral Hygiene  Assistance Level: Independent  Putting On/Taking Off Footwear  Assistance Level: Dependent  Skilled Clinical Factors: AFO's/shoes  Toileting  Assistance Level: Modified independent; Increased time to complete  Toilet Transfers  Technique: Stand step  Equipment: Grab bars;Standard toilet  Assistance Level: Supervision  Skilled Clinical Factors: ww    Instrumental ADL's  Instrumental ADLs: Yes  Health Management  Health Management Level of Assistance: Supervision  Health Management:   Medication Management Simulation Activity:  Patient completed simulation activity utilizing 7 provided medication bottles with written instruction to place a total of 74 beads into the provided weekly medication organizer this a.m. Patient now instructed to remove beads and place in corresponding bottles. Patient unable to open medication bottles.    Patient with MOD difficulty opening organizer boxes. Patient with MAX difficulty manipulating beads. Patient able to sort beads back into correct bottles with 0 errors. Patient able to securely close 1/7 caps on medication bottles. ASSESSMENT: Patient worked at a steady pace. Activity Tolerance: Patient tolerated treatment well      PLAN OF CARE:  Strengthening, Balance training, Functional mobility training, Neuromuscular re-education, Endurance training, Self-Care / ADL, Cognitive/Perceptual training, Safety education & training, Home management training, Patient/Caregiver education & training    Continue per OT POC for planned d/c on 22    Patient goals : \"to be able to wash my back and my feet. \"  Time Frame for Long term goals : 2 weeks  Long Term Goal 1: Pt within two weeks of evaluation will demonstrate progress to goals to address areas as stated below to increase functional independence for return to PLOF  Long Term Goal 2: Pt will increase independence with ADL Pt will increase independence with ADL transfers  Long Term Goal 3: Pt will increase bilateral UE strength to increase ease of ADL transfers        Therapy Time:   Individual Group Co-Treat   Time In 1400       Time Out 1430         Minutes 30                   ADL/IADL trainin minutes     Electronically signed by:    PRUDENCE Rebollar,   2022, 2:50 PM

## 2022-08-11 NOTE — PROGRESS NOTES
Subjective: The patient complains of severe acute on chronic progressive fatigue and  RLQ abdominal pain partially relieved by rest, medications, PT,  OT,     and rest and exacerbated by recent illness -she was initially admitted through the ER at [facility] complaining of abdominal pain for the past 5 to 7 days. She also complained of headache and dizziness. Imaging revealed an abdominal mass felt to be either cancer or an abscess. Imaging was thinking it was more like an abscess. She was admitted to [facility] placed on antibiotics and a drain was placed under interventional radiology's guidance. She was transferred off the rehab unit because of findings on a CT abdomen which showed aneurysm and clots however she was felt to be nonoperative given her age and complex medical status. Her family does not want to pursue aggressive treatment and she is very glad to be back on the rehabilitation unit and is medically stable and ready to participate. She still has her abdominal drain and I discussed with them that interventional radiology who had been under the impression the patient was going to the [facility] for further operative care. Now that she is not we will touch base with general surgery to see if they would be comfortable with this getting it out. .      I am concerned about patients medical complexities and barriers to advancing in rehab goals including  drain for Abd abscess. .        I reviewed current care and plans for further care with other rehab providers including nursing and case management. According to recent nursing note, \" A&O x4. Denies pain at this time. Denies dizziness. RUQ drain drsg is clean, dry and intact. Emptied 40 ml of serosanguinous drainage from bag. INR 2.1 today. In chair with alarm activated. Call light in reach. \".     Seems to be doing better now that she is getting the meclizine before breakfast.  I am hoping she is going to be coming off of her Lovenox as her INR is now therapeutic. ROS x10: The patient also complains of severely impaired mobility and activities of daily living. Otherwise no new problems with vision, hearing, nose, mouth, throat, dermal, cardiovascular, GI, , pulmonary, musculoskeletal, psychiatric or neurological. See also Acute Rehab PM&R H&P. Vital signs:  BP (!) 145/83   Pulse 70   Temp 97.5 °F (36.4 °C)   Resp 17   Ht 5' (1.524 m)   Wt 164 lb (74.4 kg)   SpO2 97%   BMI 32.03 kg/m²   I/O:   PO/Intake:  fair PO intake,   Reg diet    Bowel:   continent   Bladder: continent      retention  General:  Patient is well developed,   adequately nourished, and    well kempt. HEENT:    Pupils equal, hearing intact to loud voice, external inspection of ear and nose benign. Inspection of lips, tongue and gums benign    Musculoskeletal: No significant change in strength or tone. All joints stable. Inspection and palpation of digits and nails show no clubbing, cyanosis or inflammatory conditions. Neuro/Psychiatric: Affect: flat but pleasant. Alert and oriented to person, place and situation with  no needded cues. No significant change in deep tendon reflexes or sensation  Lungs:  Diminished, CTA-B. Respiration effort is   normal at rest.     Heart:   S1 = S2,    irreg. Abdomen:  Soft, RLQ-tender, no enlargement of liver or spleen. Extremities:    lower extremity edema    Skin:   Intact to general survey,    right lower drain site now on IV Merrem every 8 hours    Rehabilitation:  Physical Therapy:   Bed mobility:  Bed mobility  Bridging: Stand by assistance (08/04/22 1014)  Rolling to Left: Modified independent (08/10/22 1147)  Rolling to Right: Modified independent (08/10/22 1147)  Supine to Sit: Modified independent (08/10/22 1147)  Sit to Supine: Supervision (08/08/22 4685)  Bed Mobility Comments: increased time and effort to complete all transitions. Hand over hand assist to complete each activity. (08/04/22 1014)  Bed Mobility  Additional Factors: Head of bed flat; With handrails;Verbal cues; Increased time to complete (08/06/22 1516)  Additional Factors: Head of bed flat; With handrails;Verbal cues; Increased time to complete (08/06/22 1516)  Roll Right  Assistance Level: Supervision (08/06/22 1516)  Supine to Sit  Assistance Level: Supervision (08/06/22 1516)  Scooting  Assistance Level: Supervision (08/06/22 1516)  Transfers:  Transfers  Sit to Stand: Supervision (08/10/22 1147)  Stand to sit: Supervision (08/10/22 1147)  Bed to Chair: Supervision (08/10/22 1147)  Car Transfer: Stand by assistance (Demonstration on safest technique. Required Verbal and tactile cues to follow through.) (08/09/22 1133)  Comment: VCs and demo for handplacement to improve safety and technique with fait follow through. (08/05/22 1444)  Sit to Stand  Assistance Level: Supervision (08/06/22 1516)  Stand to Sit  Assistance Level: Supervision (08/06/22 1516)  Bed To/From Chair  Assistance Level: Supervision (08/06/22 1516)  Stand Pivot  Assistance Level: Contact guard assist (08/04/22 1312)  Gait:   Ambulation  WB Status: wbat (08/10/22 1146)  Ambulation  Surface: level tile;carpet (08/10/22 1146)  Device: Rolling Walker (08/10/22 1146)  Other Apparatus: O2 (08/10/22 1146)  Assistance: Supervision (08/10/22 1146)  Quality of Gait: Assist for management of O2 line. Cues for awareness to line with pt attempting to manage with fair follow through. (08/10/22 1146)  Gait Deviations: Slow Lea;Decreased step length (08/10/22 1146)  Distance: 90ft (08/10/22 1525)  Comments: Cues and assist for O2 line management. (08/09/22 1107)  Ambulation  Surface: Carpet (08/06/22 1517)  Device: Rolling walker (08/06/22 1517)  Distance: 100' (08/06/22 1517)  Activity: Within Room (08/06/22 1517)  Activity Comments: Reciprocal pattern (08/06/22 1517)  Additional Factors: Set-up; Verbal cues (08/06/22 1517)  Assistance Level: Stand by assist (08/06/22 1517)  Gait Deviations: Slow raji;Decreased step length bilateral;Decreased heel strike right;Decreased heel strike left; Wide base of support (08/06/22 1517)  Skilled Clinical Factors: Patient with AFO's donned Improved gait up to 150' Patient completes all transfers with supervision (08/06/22 1517)  Stairs:  Stairs/Curb  Stairs?: Yes (08/10/22 1146)  Stairs  # Steps : 4 (08/10/22 1146)  Stairs Height: 6\" (08/10/22 1146)  Rails: Bilateral (08/09/22 1114)  Assistance: Stand by assistance (08/10/22 1146)  Stairs  Stair Height: 6'' (08/06/22 1517)  Device: Bilateral handrails (08/06/22 1517)  Number of Stairs: 4 (08/06/22 1517)  Additional Factors: Set-up; Verbal cues; Non-reciprocal going up;Non-reciprocal going down (08/06/22 1517)  Assistance Level: Contact guard assist (08/06/22 1517)  Skilled Clinical Factors: Pt reuiring CGA with first two steps, but then Min A with third and Mod with fourth. Pt only CGA with descending forward. (08/04/22 1525)  W/C mobility:       Occupational Therapy:   Hand Dominance: Right  ADL  Feeding: Setup (08/04/22 1114)  Grooming: Setup; Increased time to complete (08/04/22 1114)  UE Bathing: Stand by assistance; Increased time to complete (08/04/22 1114)  LE Bathing: Moderate assistance; Increased time to complete (08/04/22 1114)  UE Dressing: Contact guard assistance; Increased time to complete (08/04/22 1114)  LE Dressing: Maximum assistance; Increased time to complete (08/04/22 1114)  Toileting: Maximum assistance (08/04/22 1114)  Toilet Transfers  Toilet - Technique: Stand step (08/04/22 1115)  Equipment Used: Grab bars (08/04/22 1115)  Toilet Transfer: Minimal assistance (08/04/22 1115)  Tub Transfers  Tub Transfers: Not tested (08/04/22 1115)  Shower Transfers  Shower - Transfer From: Wheelchair (08/04/22 1115)  Shower - Transfer Type: To and From (08/04/22 1115)  Shower - Transfer To:  Shower seat with back (08/04/22 1115)  Shower - Technique: Stand pivot (08/04/22 1115)  Shower Transfers: Minimal assistance (08/04/22 1115)    Speech Therapy:            Diet/Swallow:                      Lab/X-ray studies reviewed, analyzed and discussed with patient and staff:   Recent Results (from the past 24 hour(s))   Protime-INR    Collection Time: 08/11/22  4:30 AM   Result Value Ref Range    Protime 24.1 (H) 12.3 - 14.9 sec    INR 2.2    POCT Glucose    Collection Time: 08/11/22  6:29 AM   Result Value Ref Range    POC Glucose 95 70 - 99 mg/dl    Performed on ACCU-CHEK        CT ABDOMEN PELVIS  7/29/2022  1. The previously placed abscess drain in the right lower quadrant appears to be in the periphery of the abscess with loculations now more apparent in the fluid collection. The fluid collection appears to be grossly the same size as prior to drainage. 2.New mural thrombus is noted in the distal aortic arch extending to the lower thoracic aorta which was not seen on prior study. 3.Note is again made of an aortic aneurysm involving the ascending aorta, aortic arch, and descending aorta. Based on current images, the ascending aorta appears to be 2 mm larger when compared to study dating February 17, 2021. The ascending aorta now measures 5.5 cm. Previously the arch measured 5.3 cm. 4.Note is again made of left lower small pleural effusion with bilateral basilar opacities which may represent atelectasis. 5.Note is again made of a large cystic mass in the pelvis. DIAGNOSIS: Previous cystic lesion adjacent to the ascending colon status post drain placement      Bibasilar atelectasis with left small pleural effusion. Underlying nodules cannot be excluded. The heart is enlarged. The liver is of normal size and attenuation without focal lesions. The spleen is of normal size and attenuation without focal lesions. Pancreas shows no signs of focal mass or peripancreatic fluid collection. Kidneys are normal in size. There is no hydronephrosis. No renal mass is identified.   Adrenal glands are unremarkable. Note is again made of a thoracoabdominal aortic aneurysm. The ascending aorta now measures 5.5 cm, this is larger when compared to prior study dating 5.3 cm. 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 This large thrombus was not seen on prior study. No significant retroperitoneal adenopathy or ascites is identified. Again seen is a cystic lesion involving the proximal ascending aorta which now appears to be more septated than prior. The previously placed drain is now in the periphery of the lesion. CT PELVIS:  A large cystic mass is again seen in the lower pelvis. CT ABDOMEN PELVIS  : 7/18/2022    Residual intravenous contrast medium from recent CT examination identified. Lungs:  Lung bases clear. Cardiac size enlarged, unchanged. Calcification at level of mitral valve. Small hiatal hernia. Liver:  Normal in size, shape, and attenuation. Bile Ducts:  Normal in caliber. Gallbladder:  No stones or wall thickening. Pancreas:  Normal without masses, cysts, ductal dilatation or calcification. Spleen:  Normal in size without masses or calcifications. No splenules. Kidneys:  Normal in size. No hydronephrosis, masses, or stones. Adrenals:  Normal. Small bowel:  Normal in caliber. See \"peritoneum \". Appendix:  Not visualized. Colon:  Normal in caliber. See \"peritoneum \". Peritoneum:  No free air. A bilobed, 10.0 x 8.0 x 10.6 cm mass having central rounded septated areas of low attenuation, 3.7 x 3.5 x 5.4 cm, and displacing cecum and ascending colon anteriorly (series 2, image 50, series 601, image 54, series 602, image 36). No calcification identified. Trace adjacent fat stranding demonstrated. Vessels: Aorta normal in course and caliber. Lymph nodes:  Retroperitoneal:  No enlarged retroperitoneal lymph nodes. Mesenteric:  No enlarged mesenteric lymph nodes. Pelvic: No enlarged pelvic lymph nodes. Ureters: Normal in course and caliber. No calcifications. Bladder: No wall thickening. Reproductive organs: 8.0 x 5.8 x 7.5 cm left adnexal cyst displacing urinary bladder right and laterally, anteriorly, and inferiorly (series 2, image 68, series 601, image 42, series 602, image 52). Abdominal Wall: Fat identified bilateral inguinal canals. Bones:  No bone lesions. Multilevel disc space narrowing lumbar spine. No post operative changes. Complex 10.0 x 8.0 x 10.6 cm mass with septated central low attenuation fluid collections as discussed. Mass displaces cecum and ascending colon anteriorly. Differential includes malignancy including appendiceal mucocele/carcinoma, as well as retroperitoneal sarcoma with central necrotic change. Infection/abscess secondary consideration, minimal presence of adjacent inflammatory change. 8.0 x 5.8 x 7.5 cm left adnexal cysts as discussed, most likely ovarian in etiology. If clinical concern warrants, pelvic sonography may be obtained for further evaluation. Other findings discussed. CT Head   7/18/2022  1. Age-appropriate cortical atrophy and periventricular microangiopathy. When compared to previous study there has been no significant interval change. 2. No acute hemorrhage or extra-axial fluid collection            CT ABDOMEN PELVIS   7/18/2022     1. In the right lower quadrant of the abdomen, pericolic abscesses versus pericolic mass is seen. This is thought more likely abscess. 2. A cystic mass is again seen in the pelvis and is likely in the ovary. It is unchanged from previous examination The patient will be given oral contrast and rescanned to evaluate the right lower quadrant. Previous extensive, complex labs, notes and diagnostics reviewed and analyzed.      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 Functional and medical status reassessed regarding patients ability to participate in therapies and patient found to be able to participate in acute intensive comprehensive inpatient rehabilitation program including PT/OT to improve balance, ambulation, ADLs, and to improve the P/AROM. Therapeutic modifications regarding activities in therapies, place, amount of time per day and intensity of therapy made daily. In bed therapies or bedside therapies prn. Bowel and Bladder dysfunction nocturia with severe urinary retention, Neurogenic bowel and bladder:  frequent toileting, ambulate to bathroom with assistance, check post void residuals. Check for C.difficile x1 if >2 loose stools in 24 hours, continue bowel & bladder program.  Monitor bowel and bladder function. Lactinex 2 PO every AC. MOM prn, Brown Bomb prn, Glycerin suppository prn, enema prn. Encourage therapy and nursing to co-treat and problem solve re continence. Recent UA was negative. Severe right Abd  pain as well as generalized OA pain: reassess pain every shift and prior to and after each therapy session, give prn Tylenol and consider scheduled Tylenol, modalities prn in therapy, masage, Lidoderm, K-pad prn. Consider scheduled AM pain meds. Skin healing right abd and breakdown risk:  continue pressure relief program.  Daily skin exams and reports from nursing. Fatigue due to nutritional and hydration deficiency: Add and titrate vitamin B12 vitamin D and CoQ10 continue to monitor I&Os, calorie counts prn, dietary consult prn. Add healthy snack at night. Acute episodic insomnia with situational adjustment disorder:  prn Ambien, monitor for day time sedation.   Falls risk elevated:  patient to use call light to get nursing assistance to get up, bed and chair alarm.  Elevated DVT risk: progressive activities in PT, continue prophylaxis PORTILLO hose, elevation and  Lovenox to Coumadin . Complex discharge planning: Discharge August 17, 2022 to home alone with help from her son with home health care and video monitor. We are suggesting that patient should not be home alone for more than a few minutes. Apparently patient and family do not have that option. We are hoping that patient can go stay with one of her children though she is quite against the idea. He is competent to make her own decisions. Weekly team meeting every Monday to re-assess progress towards goals, discuss and address social, psychological and medical comorbidities and to address difficulties they may be having progressing in therapy. Patient and family education is in progress. The patient is to follow-up with their family physician after discharge. Complex Active General Medical Issues that complicate care Assess & Plan:     A-fib,   Essential hypertension, Acute on chronic combined systolic (congestive) and diastolic (congestive) heart failure-Acute rehab to monitor heart rate and rhythm with the option of telemetry and the effects of chronotropic medication with respect to increasing physical activity and exercise in PT, OT, ADLs with medication titration to lowest effective dosing. Continue blood signs every shift focusing on heart rate, rhythm and blood pressure checks with orthostatic checks-monitoring the effect of exercise, therapy and posture. Consult hospitalist for backup medical and adjust/add medications (Lovenox transitioning to Coumadin patient had been on Xarelto, Coreg). Monitor heart rate and blood pressure as well as medications effects on vital signs before during and after therapy with especial focus on preventing orthostasis and falls risk.     Hyponatremia-recheck BMP avoid excessive water  Anemia-Monitor vital signs monitor for orthostasis and tachycardia, check H&H prn.  Vitamin B12 and iron-dose iron with food to prevent GI upset. Monitor for constipation. Monitor stools for blood. Hypothyroid-  Synthroid and titrate dosing. Follow vital signs, recheck TSH and thyroid studies as outpatient. 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 Osteoarthritis of lumbar spine with myelopathy-her extremity strengthening    Depression-emotional support provided daily, vitamin B12, encourage participation in rehabilitation support group and recreational therapy, adjust/add medications (B12, Celexa)  Abdominal abscess versus cancer-drain still in place pending cytology-patient is on IV Merrem every 8 hours  SP cultures-Pt has right abd abscess/mass. S/p drainage. SHe also has enlarging Thoraci Aortic   Aneurysm with large mural thrombus burden. Per GI surgery Dr. Shasta Rolle he would like to keep the drain is long as possible. Do not remove the drain. GERD-Elevate head of bed after meals, monitor stools for blood, lowest effective dose of PPI, consider Tums. Yeast rash and immunosuppression-Micatin powder and Floranex   AA now at 5.5cm up from 5.3cm but now there is a new large mural thrombus involving the descending portion of the aortic arch and extending how to the lower thoracic aorta along with large cystic mass. The thrombus was not seen on previous study. Patient had been on xarelto and now on Lovenox transitioning to Coumadin. . Given her abdominal pathology and large thrombus however patient due to her advanced age and high risk for any surgical intervention she is electing for conservative care.   Dizziness and gait ataxia-She is getting dizzy especially if he eats she does not get her Antivert in the morning and therapy starts before she can get the Antivert therefore I have asked my shift to give and schedule basis before breakfast and before shift change. Focus on reassessing rehab goals and coordinating therapy and medical self care issues.           Electronically signed by Gloria Henry DO on 8/5/22 at 4:51 AM TYET       Kavitha Porras D.O., PM&R     Attending    [facility] Pamela Cabrera

## 2022-08-11 NOTE — PROGRESS NOTES
Warfarin Dosing - Pharmacy Consult Note  Consulting Provider: TARA  Indication:   thrombus  Warfarin Dose prior to admission: na   Concurrent anticoagulants/antiplatelets: na  Significant Drug Interactions: Celexa  Recent Labs     08/09/22  0426 08/10/22  0443 08/11/22  0430   INR 2.0 2.1 2.2     Recent warfarin administrations                     warfarin (COUMADIN) tablet 7.5 mg (mg) 7.5 mg Given 08/10/22 1832    warfarin (COUMADIN) tablet 7.5 mg (mg) 7.5 mg Given 08/09/22 1722    warfarin (COUMADIN) tablet 7.5 mg (mg) 7.5 mg Given 08/08/22 1725                   Date       INR    Dose  08/10/22    2.1               7.5 mg  08/11/22    2.2               7.5 mg  Assessment/Plan  (Goal INR: 2 - 3)  Will give 7.5 mg warfarin today for therapeutic INR of 2.2    Active problem list reviewed. INR orders are placed. Chart reviewed for pertinent labs, drug/diet interactions, and past doses. Documentation of patient's clinical condition was reviewed. Pharmacy Dosing:  Pharmacy will continue to follow.       Laila Brumfield [facility]  8/11/2022  10:43 AM

## 2022-08-11 NOTE — PLAN OF CARE
Problem: Discharge Planning  Goal: Discharge to home or other facility with appropriate resources  8/10/2022 2247 by Vicki Hughes RN  Outcome: Progressing  Flowsheets (Taken 8/10/2022 2125)  Discharge to home or other facility with appropriate resources: Identify barriers to discharge with patient and caregiver  8/10/2022 1137 by Dejan Koehler RN  Outcome: Progressing

## 2022-08-12 LAB
INR BLD: 2.2
PROTHROMBIN TIME: 24 SEC (ref 12.3–14.9)

## 2022-08-12 PROCEDURE — 2700000000 HC OXYGEN THERAPY PER DAY

## 2022-08-12 PROCEDURE — 85610 PROTHROMBIN TIME: CPT

## 2022-08-12 PROCEDURE — 97530 THERAPEUTIC ACTIVITIES: CPT

## 2022-08-12 PROCEDURE — 1180000000 HC REHAB R&B

## 2022-08-12 PROCEDURE — 6370000000 HC RX 637 (ALT 250 FOR IP): Performed by: PHYSICAL MEDICINE & REHABILITATION

## 2022-08-12 PROCEDURE — 2580000003 HC RX 258: Performed by: INTERNAL MEDICINE

## 2022-08-12 PROCEDURE — 97116 GAIT TRAINING THERAPY: CPT

## 2022-08-12 PROCEDURE — 99232 SBSQ HOSP IP/OBS MODERATE 35: CPT | Performed by: INTERNAL MEDICINE

## 2022-08-12 PROCEDURE — 99232 SBSQ HOSP IP/OBS MODERATE 35: CPT | Performed by: PHYSICAL MEDICINE & REHABILITATION

## 2022-08-12 PROCEDURE — 97535 SELF CARE MNGMENT TRAINING: CPT

## 2022-08-12 PROCEDURE — 97112 NEUROMUSCULAR REEDUCATION: CPT

## 2022-08-12 PROCEDURE — 6370000000 HC RX 637 (ALT 250 FOR IP): Performed by: INTERNAL MEDICINE

## 2022-08-12 PROCEDURE — 97110 THERAPEUTIC EXERCISES: CPT

## 2022-08-12 PROCEDURE — 6360000002 HC RX W HCPCS: Performed by: INTERNAL MEDICINE

## 2022-08-12 PROCEDURE — 36415 COLL VENOUS BLD VENIPUNCTURE: CPT

## 2022-08-12 RX ORDER — WARFARIN SODIUM 5 MG/1
7.5 TABLET ORAL
Status: COMPLETED | OUTPATIENT
Start: 2022-08-12 | End: 2022-08-12

## 2022-08-12 RX ORDER — HYDROCORTISONE 25 MG/G
CREAM TOPICAL 2 TIMES DAILY PRN
Status: DISCONTINUED | OUTPATIENT
Start: 2022-08-12 | End: 2022-08-18 | Stop reason: HOSPADM

## 2022-08-12 RX ADMIN — HYDROCORTISONE: 25 CREAM TOPICAL at 21:06

## 2022-08-12 RX ADMIN — MEROPENEM 1000 MG: 1 INJECTION, POWDER, FOR SOLUTION INTRAVENOUS at 14:09

## 2022-08-12 RX ADMIN — CITALOPRAM HYDROBROMIDE 20 MG: 10 TABLET ORAL at 08:58

## 2022-08-12 RX ADMIN — FUROSEMIDE 20 MG: 20 TABLET ORAL at 08:58

## 2022-08-12 RX ADMIN — Medication 10 ML: at 09:01

## 2022-08-12 RX ADMIN — LACTOBACILLUS TAB 2 TABLET: TAB at 21:06

## 2022-08-12 RX ADMIN — CLOTRIMAZOLE: 1 CREAM TOPICAL at 21:35

## 2022-08-12 RX ADMIN — LACTOBACILLUS TAB 2 TABLET: TAB at 08:58

## 2022-08-12 RX ADMIN — MECLIZINE 12.5 MG: 12.5 TABLET ORAL at 06:10

## 2022-08-12 RX ADMIN — MEROPENEM 1000 MG: 1 INJECTION, POWDER, FOR SOLUTION INTRAVENOUS at 21:27

## 2022-08-12 RX ADMIN — FERROUS SULFATE TAB 325 MG (65 MG ELEMENTAL FE) 325 MG: 325 (65 FE) TAB at 17:40

## 2022-08-12 RX ADMIN — CARVEDILOL 6.25 MG: 6.25 TABLET, FILM COATED ORAL at 17:40

## 2022-08-12 RX ADMIN — WARFARIN SODIUM 7.5 MG: 5 TABLET ORAL at 17:40

## 2022-08-12 RX ADMIN — LEVOTHYROXINE SODIUM 88 MCG: 88 TABLET ORAL at 08:58

## 2022-08-12 RX ADMIN — Medication 100 MG: at 17:40

## 2022-08-12 RX ADMIN — PANTOPRAZOLE SODIUM 40 MG: 40 TABLET, DELAYED RELEASE ORAL at 06:10

## 2022-08-12 RX ADMIN — LACTOBACILLUS TAB 2 TABLET: TAB at 14:09

## 2022-08-12 RX ADMIN — Medication 2000 UNITS: at 17:40

## 2022-08-12 RX ADMIN — MEROPENEM 1000 MG: 1 INJECTION, POWDER, FOR SOLUTION INTRAVENOUS at 05:22

## 2022-08-12 RX ADMIN — SODIUM CHLORIDE, PRESERVATIVE FREE 10 ML: 5 INJECTION INTRAVENOUS at 05:59

## 2022-08-12 RX ADMIN — MICONAZOLE NITRATE: 2 POWDER TOPICAL at 09:01

## 2022-08-12 RX ADMIN — CARVEDILOL 6.25 MG: 6.25 TABLET, FILM COATED ORAL at 08:58

## 2022-08-12 RX ADMIN — Medication 10 ML: at 21:34

## 2022-08-12 ASSESSMENT — ENCOUNTER SYMPTOMS
SHORTNESS OF BREATH: 0
BACK PAIN: 1
BLOOD IN STOOL: 0
STRIDOR: 0
COUGH: 0
RESPIRATORY NEGATIVE: 1
NAUSEA: 0
WHEEZING: 0
GASTROINTESTINAL NEGATIVE: 1
CHEST TIGHTNESS: 0
EYES NEGATIVE: 1

## 2022-08-12 NOTE — PROGRESS NOTES
Infectious Disease     Patient Name: Yann Blanton  Date: 8/12/2022  YOB: 1932  Medical Record Number: [id number]      Abdominal abscess          Patient presented 7/22 abdominal pain CT scan showing mass abscess in right lower quadrant had drainage catheter placed at that time was initially on Zosyn and switched to Rocephin  Abscess first found on CT scan 7/18/2022 and drainage of abscess performed on 7/21/2022    Catheter stopped working but not removed she was reevaluated by interventional radiology on 72/9 new catheter was not placed  CT scan showed enlarging aortic aneurysm with thrombus she was placed on a heparin drip which precluded the placement of the catheter    There was a plan to transfer patient to tertiary care but this did not happen he was transferred to rehab        CT of the Abdomen and Pelvis without intravenous contrast medium       History:  Approximate 1 week history of abdominal pain       Technical Factors:       CT imaging of the abdomen and pelvis were obtained and formatted as 5 mm contiguous axial images from the domes of the diaphragm to the symphysis pubis. Sagittal and coronal reconstructions were also obtained. Oral contrast medium: Barium sulfate, 450 mL. Intravenous contrast medium:  None. Comparison:  CT abdomen pelvis, July 18, 2022, 1444 hours, February 8, 2021. Findings:       Residual intravenous contrast medium from recent CT examination identified. Lungs:  Lung bases clear. Cardiac size enlarged, unchanged. Calcification at level of mitral valve. Small hiatal hernia. Liver:  Normal in size, shape, and attenuation. Bile Ducts:  Normal in caliber. Gallbladder:  No stones or wall thickening. Pancreas:  Normal without masses, cysts, ductal dilatation or calcification. Spleen:  Normal in size without masses or calcifications. No splenules. Kidneys:  Normal in size.   No hydronephrosis, masses, or stones. Adrenals:  Normal.        Small bowel:  Normal in caliber. See \"peritoneum \". Appendix:  Not visualized. Colon:  Normal in caliber. See \"peritoneum \". Peritoneum:  No free air. A bilobed, 10.0 x 8.0 x 10.6 cm mass having central rounded septated areas of low attenuation, 3.7 x 3.5 x 5.4 cm, and displacing cecum and ascending colon anteriorly (series 2, image 50, series 601, image 54, series 602, image    36). No calcification identified. Trace adjacent fat stranding demonstrated. Vessels: Aorta normal in course and caliber. Lymph nodes:         Retroperitoneal:  No enlarged retroperitoneal lymph nodes. Mesenteric:  No enlarged mesenteric lymph nodes. Pelvic: No enlarged pelvic lymph nodes. Ureters: Normal in course and caliber. No calcifications. Bladder: No wall thickening. Reproductive organs: 8.0 x 5.8 x 7.5 cm left adnexal cyst displacing urinary bladder right and laterally, anteriorly, and inferiorly (series 2, image 68, series 601, image 42, series 602, image 52). Abdominal Wall: Fat identified bilateral inguinal canals. Bones:  No bone lesions. Multilevel disc space narrowing lumbar spine. No post operative changes. Impression       Complex 10.0 x 8.0 x 10.6 cm mass with septated central low attenuation fluid collections as discussed. Mass displaces cecum and ascending colon anteriorly. Differential includes malignancy including appendiceal mucocele/carcinoma, as well as    retroperitoneal sarcoma with central necrotic change. Infection/abscess secondary consideration, minimal presence of adjacent inflammatory change. 8.0 x 5.8 x 7.5 cm left adnexal cysts as discussed, most likely ovarian in etiology. If clinical concern warrants, pelvic sonography may be obtained for further evaluation. Other findings discussed.                      1.Successful drainage and drain placement of right colonic soft tissue mass with central fluid collection. 2.Fluid was aspirated and sent for microbiology and cytology analysis. Additionally a 10 Greek drain was placed yielding a thick red to yellow fluid with internal yellow debris. HISTORY: Matt Owens is a Female of 80 years age. DIAGNOSIS:   Right abdominal mass with possible fluid collection vs necrotic tissue        COMPARISON: None available. CT Dose-Length Product (estimate related to radiation exposure from this exam):  541.6  mGy*cm. PROCEDURE:   Following the discussion of the procedure, alternatives, risks versus benefits, informed consent was obtained from the patient. Specifically, risks of after-biopsy pain at the site, rare possibility of excessive hemorrhage, infection, injury to the    adjacent organs were discussed and the patient verbalized understanding. Pre-procedure evaluation confirmed that the patient was an appropriate candidate for conscious    sedation. Adequate sedation was maintained during the entire procedure. Vital signs, pulse    oximetry, and response to verbal commands were monitored and recorded by the nurse throughout the    procedure and the recovery period. Medical information was entered in the medical record including the    medications and dosages used. The patient returned to baseline neurologic and physiologic status    prior to leaving the department. No immediate sedation related complications were noted. Medication    for conscious sedation was administered via IV route. 45 minutes of conscious sedation was    provided. Following universal protocol, patient and site verification was performed with a \"timeout\" prior to the procedure. The patient was placed on the CT table in supine  position and the right lateral abdomen area was prepped and draped in usual sterile fashion.   Using the usual sterile conditions, lidocaine and CT guidance, the fluid collection within soft tissue density    was accessed using a Yueh needle. After confirmation of appropriate localization of the needle, aspiration yielded a thick red to yellow fluid which was sent for microbiology and cytology analysis. Following that an Amplatz wire was placed through the    Yueh sheath into the abscess under CT guidance. The tract was serially dilated with 6, 8, and 10 [place] dilators respectively. Following that a 10 Telugu drainage catheter was advanced over the wire into the abscess under CT guidance and the anchoring    loop was formed. Catheter was sutured to the skin and and secured with Percufix device. The patient tolerated the procedure well. No immediate complications identified. The patient left the CT suite in supine position to the recovery room in stable    condition. Total local anesthetic used: lidocaine, approximately 15 mL. Estimated blood loss:  None. 1.The previously placed abscess drain in the right lower quadrant appears to be in the periphery of the abscess with loculations now more apparent in the fluid collection. The fluid collection appears to be grossly the same size as prior to drainage. 2.New mural thrombus is noted in the distal aortic arch extending to the lower thoracic aorta which was not seen on prior study. Findings discussed with Dr. Fer Sabillon   3. Note is again made of an aortic aneurysm involving the ascending aorta, aortic arch, and descending aorta. Based on current images, the ascending aorta appears to be 2 mm larger when compared to study dating February 17, 2021. The ascending aorta now    measures 5.5 cm. Previously the arch measured 5.3 cm. 4.Note is again made of left lower small pleural effusion with bilateral basilar opacities which may represent atelectasis. 5.Note is again made of a large cystic mass in the pelvis.        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        CT Dose-Length Product (estimate related to radiation exposure from this exam):  1386.22  mGy*cm. CT ABDOMEN:        Bibasilar atelectasis with left small pleural effusion. Underlying nodules cannot be excluded. The heart is enlarged. The liver is of normal size and attenuation without focal lesions. The spleen is of normal size and attenuation without focal lesions. Pancreas shows no signs of focal mass or peripancreatic fluid collection. Kidneys are normal in size. There is no    hydronephrosis. No renal mass is identified. Adrenal glands are unremarkable. Note is again made of a thoracoabdominal aortic aneurysm. The ascending aorta now measures 5.5 cm, this is larger when compared to prior study dating 5.3 cm. Note is now made    of a new large mural thrombus involving the descending portion of the aortic arch and extending down to the lower thoracic aorta. This large thrombus was not seen on prior study. No significant retroperitoneal adenopathy or ascites is identified. Again    seen is a cystic lesion involving the proximal ascending aorta which now appears to be more septated than prior. The previously placed drain is now in the periphery of the lesion. CT PELVIS:    A large cystic mass is again seen in the lower pelvis. Review of Systems   Respiratory: Negative. Cardiovascular: Negative. Gastrointestinal: Negative. Neurological: Negative. Psychiatric/Behavioral: Negative. Physical Exam  Cardiovascular:      Heart sounds: Normal heart sounds. No murmur heard. Pulmonary:      Effort: Pulmonary effort is normal. No respiratory distress.       Breath sounds: Normal breath sounds. No wheezing, rhonchi or rales. Abdominal:      General: Abdomen is flat. Bowel sounds are normal. There is no distension. Palpations: Abdomen is soft. There is no mass. Tenderness: There is no abdominal tenderness. There is no guarding. Blood pressure (!) 117/57, pulse 52, temperature 98.1 °F (36.7 °C), temperature source Oral, resp. rate 17, height 5' (1.524 m), weight 159 lb 3.2 oz (72.2 kg), SpO2 96 %. .   Lab Results   Component Value Date    WBC 6.2 08/04/2022    HGB 10.1 (L) 08/04/2022    HCT 30.4 (L) 08/04/2022    MCV 83.9 08/04/2022     08/04/2022     Lab Results   Component Value Date/Time     08/04/2022 03:28 AM    K 3.9 08/04/2022 03:28 AM    K 3.2 07/24/2022 05:00 AM     08/04/2022 03:28 AM    CO2 27 08/04/2022 03:28 AM    BUN 14 08/04/2022 03:28 AM    CREATININE 0.56 08/04/2022 03:28 AM    GLUCOSE 110 08/04/2022 03:28 AM    CALCIUM 8.8 08/04/2022 03:28 AM          Repeat CT scan of abdomen to assess abscess  Examination: CT ABDOMEN PELVIS W IV CONTRAST       Indication:   Abdominal abscess , follow-up       Technique: Multiple serial axial images was performed through the abdomen and pelvis utilizing 50 cc of Isovue 300. Images were reconstructed in the axial and coronal and sagittal planes. Comparison: July 29, 2022 1002 hours. Findings:       Within the field-of-view of the basilar again note is made of aneurysmal dilatation of the visualized portion of descending thoracic aorta. Unchanged. There is bibasilar areas of atelectasis, scarring left greater than right. Small left pleural    effusion. The liver, spleen, pancreas, adrenals, kidneys are unremarkable. The gallbladder is contracted. Large and small bowel show no sign of obstruction. Subjacent to the proximal portion of the ascending colon/cecum is again identified a thick walled heterogeneous enhancing soft tissue collection with scattered air.  A drainage catheter is seen dependently within the collection. Collection measures    approximately 6.3 x 4.2 x 5.4. There is some peripheral enhancement and findings are that of a abscess with drainage catheter. The appendix is not visualized. .  No diverticulitis. Small to moderate hiatal hernia. Again note is made of a left cystic adnexal mass measuring 6.6 x 8.1 x 7.7 in the transverse AP and cephalocaudad dimension. Small bilateral renal hernias containing mesenteric fat       No free air. Trace amount of free fluid in the right paracolic gutter and subjacent to the ascending colon. The visualized abdominal aorta is of normal size and caliber. No significant retroperitoneal adenopathy. There is multilevel degenerative changes bridging arthritis of the visualized thoracolumbar spine. There is a grade 1 anterolisthesis of L4 and L5. Loss of height of T7-T8 again noted. Likely old compression fractures           Impression   1. THERE IS A THICK WALLED PERIPHERALLY ENHANCING complex SOFT TISSUE COLLECTION WITH AIR AS WELL AS A DRAINAGE CATHETER SUBJACENT TO THE PROXIMAL PORTION OF THE ASCENDING COLON, CECUM. LIKELY THAT OF THE ABSCESS. IT HAS SHOWN SOME INTERVAL DECREASE IN    SIZE as COMPARED TO THE PRIOR EXAMINATION. 2. LARGE CYSTIC ADNEXAL MASS. UNCHANGED. THE GALLBLADDER NEOPLASM IS NOT EXCLUDED. CONTINUED FURTHER EVALUATION    3. SMALL LEFT PLEURAL EFFUSION.         OTHER FINDINGS DETAILED ABOVE         ASSESSMENT:  PLAN:  Intra-abdominal abscess  meropenem     Abscess not drained will need a multiweek course of therapy

## 2022-08-12 NOTE — PROGRESS NOTES
Occupational Therapy Get up and Go Note            Date: 2022  Patient Name: Harleen Valencia        MRN: [id number]    Account #: [de-identified]  : 1932  (80 y.o.)      Subjective:  Patient states:  \"Bathroom? \"  Pain:  Pain at start of treatment: No    Pain at end of treatment: No        Objective:  ADL:  Feeding: Mod I  Toileting:  Sup  Toilet transfers:  SBA  Assisted with hand hygiene for soap dispense d/t it being out reach    Functional Mobility  Supine>EOB: Sup/Mod Ind  HOB slightly elevated with use of bed rail  Required assist to grab drainage bag d/t pt leaving it behind on the bed; was unclipped from gown; re-clipped to gown    Left pt with call light beside her, tray in front with breakfast, and stool under feet; alarm on chair intact. Treatment consisted of:   [x] ADL Training  [] Strengthening   [] Transfer Training    [] DME Education  [] HEP   [] Patient Education  [] Other:    Safety:  Safety Devices  Safety Devices in place:   Type of devices: All fall risk precautions in place      Therapy Time:   Individual Group Co-Treat   Time In 723       Time Out 0742         Minutes 19             ADL/IADL trainin minutes         Electronically signed by:     PRUDENCE Brown    2022, 7:58 AM

## 2022-08-12 NOTE — PROGRESS NOTES
Pt complete assessment at 2130. Pt wieh eyes close but easily arouses. Denies any pain  SOB or dizziness. HL #20 L AC intact with good blood return- IVAB hung. Drain to RUQ abd intact & compressed. Scant noted in bag. Call light and bedside table with in reach.

## 2022-08-12 NOTE — PROGRESS NOTES
OCCUPATIONAL THERAPY  INPATIENT REHAB TREATMENT NOTE  [facility]      NAME: Jake Wilson  : 1932 (80 y.o.)  MRN: [id number]  CODE STATUS: DNR-CC  Room: R250/R250-01    Date of Service: 2022    Referring Physician: Dr. 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 This. \"     Pain at start of treatment:  No 0/10    Pain at end of treatment:   No 0/10    COGNITION:  Orientation  Overall Orientation Status: Within Functional Limits  Cognition  Overall Cognitive Status: WFL  Cognition Comment: Comp Mod I, expression Mod I, social Ind, problem Sup, Memory Sup    OBJECTIVE:    Peg Activity:  Patient engaged in B UE ROM/strengthening, B FM coordination and cognition to increase I with ADL's, IADL's and transfers. Patient donned B 1/2 # wrist weights. Patient able to reach in lateral planes with MIN difficulty. Patient able to reach in forward planes with MIN difficulty. Patient able to  100 large mushroom pegs with MIN difficulty 1 at a time. Patient able to place large pegs 1 at a time in pegboard with MIN difficulty. Patient alternated UE's after every 10th peg. Patient able to  50 marbles with MOD difficulty 1 at a time. Patient able to  50 pennies with MAX difficulty 1 at a time. Patient able to follow AB pattern with 0 verbal cues while placing marbles and pennies on large pegs. Patient with MIN difficulty placing marbles on large pegs. Patient with MIN difficulty placing pennies on large pegs. Patient with rest breaks as needed. ASSESSMENT: Patient worked at a slow and steady pace.     Activity Tolerance: Patient tolerated treatment well      PLAN OF CARE:  Strengthening, Balance training, Functional mobility training, Neuromuscular re-education, Endurance training, Self-Care / ADL, Cognitive/Perceptual training, Safety education & training, Home management training, Patient/Caregiver education & training    Continue per OT POC for planned d/c on 8-17-22    Patient goals : \"to be able to wash my back and my feet. \"  Time Frame for Long term goals : 2 weeks  Long Term Goal 1: Pt within two weeks of evaluation will demonstrate progress to goals to address areas as stated below to increase functional independence for return to PLOF  Long Term Goal 2: Pt will increase independence with ADL Pt will increase independence with ADL transfers  Long Term Goal 3: Pt will increase bilateral UE strength to increase ease of ADL transfers        Therapy Time:   Individual Group Co-Treat   Time In 1400       Time Out 1500         Minutes 60                   Therapeutic activities: 60 minutes     Electronically signed by:    PRUDENCE Hankins,   8/12/2022, 4:08 PM

## 2022-08-12 NOTE — PROGRESS NOTES
Subjective: The patient complains of severe acute on chronic progressive fatigue and  RLQ abdominal pain partially relieved by rest, medications, PT,  OT,     and rest and exacerbated by recent illness -she was initially admitted through the ER at [facility] complaining of abdominal pain for the past 5 to 7 days. She also complained of headache and dizziness. Imaging revealed an abdominal mass felt to be either cancer or an abscess. Imaging was thinking it was more like an abscess. She was admitted to [facility] placed on antibiotics and a drain was placed under interventional radiology's guidance. She was transferred off the rehab unit because of findings on a CT abdomen which showed aneurysm and clots however she was felt to be nonoperative given her age and complex medical status. Her family does not want to pursue aggressive treatment and she is very glad to be back on the rehabilitation unit and is medically stable and ready to participate. She still has her abdominal drain and I discussed with them that interventional radiology who had been under the impression the patient was going to the [facility] for further operative care. Now that she is not we will touch base with general surgery to see if they would be comfortable with this getting it out. .      I am concerned about patients medical complexities and barriers to advancing in rehab goals including  drain for Abd abscess. .        I reviewed current care and plans for further care with other rehab providers including nursing and case management. According to recent nursing note, \"  Pt up to BR x3 during the hs- up with W/W safety cautious noted. \".  Her blood pressure has been improving. She is less lightheaded         ROS x10: The patient also complains of severely impaired mobility and activities of daily living.   Otherwise no new problems with vision, hearing, nose, mouth, throat, dermal, cardiovascular, GI, , pulmonary, musculoskeletal, psychiatric or neurological. See also Acute Rehab PM&R H&P. Vital signs:  BP (!) 117/57   Pulse 52   Temp 98.1 °F (36.7 °C) (Oral)   Resp 17   Ht 5' (1.524 m)   Wt 159 lb 3.2 oz (72.2 kg)   SpO2 96%   BMI 31.09 kg/m²   I/O:   PO/Intake:  fair PO intake,   Reg diet    Bowel:   continent   Bladder: continent      retention  General:  Patient is well developed,   adequately nourished, and    well kempt. HEENT:    Pupils equal, hearing intact to loud voice, external inspection of ear and nose benign. Inspection of lips, tongue and gums benign    Musculoskeletal: No significant change in strength or tone. All joints stable. Inspection and palpation of digits and nails show no clubbing, cyanosis or inflammatory conditions. Neuro/Psychiatric: Affect: flat but pleasant. Alert and oriented to person, place and situation with  no needded cues. No significant change in deep tendon reflexes or sensation  Lungs:  Diminished, CTA-B. Respiration effort is   normal at rest.     Heart:   S1 = S2,    irreg. Abdomen:  Soft, RLQ-tender, no enlargement of liver or spleen. Extremities:    lower extremity edema    Skin:   Intact to general survey,    right lower drain site now on IV Merrem every 8 hours    Rehabilitation:  Physical Therapy:   Bed mobility:  Bed mobility  Bridging: Stand by assistance (08/04/22 1014)  Rolling to Left: Modified independent (08/10/22 1147)  Rolling to Right: Modified independent (08/10/22 1147)  Supine to Sit: Modified independent (08/10/22 1147)  Sit to Supine: Supervision (08/08/22 1525)  Bed Mobility Comments: increased time and effort to complete all transitions. Hand over hand assist to complete each activity. (08/04/22 1014)  Bed Mobility  Additional Factors: Head of bed flat; With handrails;Verbal cues; Increased time to complete (08/06/22 1516)  Additional Factors: Head of bed flat; With handrails;Verbal cues; Increased time to complete (08/06/22 1516)  Roll Right  Assistance Level: Supervision (08/06/22 1516)  Supine to Sit  Assistance Level: Supervision (08/06/22 1516)  Scooting  Assistance Level: Supervision (08/06/22 1516)  Transfers:  Transfers  Sit to Stand: Supervision;Minimal Assistance (08/11/22 1117)  Stand to sit: Supervision (08/11/22 1117)  Bed to Chair: Supervision (08/11/22 1117)  Car Transfer: Stand by assistance (Demonstration on safest technique. Required Verbal and tactile cues to follow through.) (08/09/22 1133)  Comment: Patient requires min A to stand from chair without arm rest (08/11/22 1117)  Sit to Stand  Assistance Level: Supervision (08/06/22 1516)  Stand to Sit  Assistance Level: Supervision (08/06/22 1516)  Bed To/From Chair  Assistance Level: Supervision (08/06/22 1516)  Stand Pivot  Assistance Level: Contact guard assist (08/04/22 1312)  Gait:   Ambulation  WB Status: wbat (08/10/22 1146)  Ambulation  Surface: level tile;carpet (08/11/22 1116)  Device: Rolling Walker (08/11/22 1116)  Other Apparatus: O2 (08/11/22 1116)  Assistance: Supervision (08/11/22 1116)  Quality of Gait: Assist for management of O2 line. Cues for awareness to line with pt attempting to manage with fair follow through. (08/10/22 1146)  Gait Deviations: Slow Raji;Decreased step length (08/10/22 1146)  Distance: 60 feet x2 (08/11/22 1116)  Comments: Cues and assist for O2 line management. (08/09/22 1107)  Ambulation  Surface: Carpet (08/06/22 1517)  Device: Rolling walker (08/06/22 1517)  Distance: 100' (08/06/22 1517)  Activity: Within Room (08/06/22 1517)  Activity Comments: Reciprocal pattern (08/06/22 1517)  Additional Factors: Set-up; Verbal cues (08/06/22 1517)  Assistance Level: Stand by assist (08/06/22 1517)  Gait Deviations: Slow raji;Decreased step length bilateral;Decreased heel strike right;Decreased heel strike left; Wide base of support (08/06/22 7327)  Skilled Clinical Factors: Patient with AFO's donned Improved gait up to 150' Patient completes all transfers with supervision (08/06/22 1517)  Stairs:  Stairs/Curb  Stairs?: Yes (08/10/22 1146)  Stairs  # Steps : 4 (08/10/22 1146)  Stairs Height: 6\" (08/10/22 1146)  Rails: Bilateral (08/09/22 1114)  Assistance: Stand by assistance (08/10/22 1146)  Stairs  Stair Height: 6'' (08/06/22 1517)  Device: Bilateral handrails (08/06/22 1517)  Number of Stairs: 4 (08/06/22 1517)  Additional Factors: Set-up; Verbal cues; Non-reciprocal going up;Non-reciprocal going down (08/06/22 1517)  Assistance Level: Contact guard assist (08/06/22 1517)  Skilled Clinical Factors: Pt reuiring CGA with first two steps, but then Min A with third and Mod with fourth. Pt only CGA with descending forward. (08/04/22 1525)  W/C mobility:       Occupational Therapy:   Hand Dominance: Right  ADL  Feeding: Setup (08/04/22 1114)  Grooming: Setup; Increased time to complete (08/04/22 1114)  UE Bathing: Stand by assistance; Increased time to complete (08/04/22 1114)  LE Bathing: Moderate assistance; Increased time to complete (08/04/22 1114)  UE Dressing: Setup (08/12/22 1047)  LE Dressing: Moderate assistance;Dependent/Total (08/12/22 1047)  LE Dressing Skilled Clinical Factors: Dep to don/doff socks and shoes. Mod A to don pants over feet and bring to knees. (08/12/22 1047)  Toileting: Maximum assistance (08/04/22 1114)  Additional Comments: Pt participating in ADLs seated in w/c and standing by w/c using w/w for dynamic standing balance, CGA. (08/12/22 1047)  Toilet Transfers  Toilet - Technique: Stand step (08/04/22 1115)  Equipment Used: Grab bars (08/04/22 1115)  Toilet Transfer: Minimal assistance (08/04/22 1115)  Tub Transfers  Tub Transfers: Not tested (08/04/22 1115)  Shower Transfers  Shower - Transfer From: Wheelchair (08/04/22 1115)  Shower - Transfer Type: To and From (08/04/22 1115)  Shower - Transfer To:  Shower seat with back (08/04/22 1115)  Shower - Technique: Stand pivot (08/04/22 1115)  Shower Transfers: Minimal assistance (08/04/22 1115)    Speech Therapy:            Diet/Swallow:                      Lab/X-ray studies reviewed, analyzed and discussed with patient and staff:   Recent Results (from the past 24 hour(s))   Protime-INR    Collection Time: 08/12/22  4:36 AM   Result Value Ref Range    Protime 24.0 (H) 12.3 - 14.9 sec    INR 2.2        CT ABDOMEN PELVIS  7/29/2022  1. The previously placed abscess drain in the right lower quadrant appears to be in the periphery of the abscess with loculations now more apparent in the fluid collection. The fluid collection appears to be grossly the same size as prior to drainage. 2.New mural thrombus is noted in the distal aortic arch extending to the lower thoracic aorta which was not seen on prior study. 3.Note is again made of an aortic aneurysm involving the ascending aorta, aortic arch, and descending aorta. Based on current images, the ascending aorta appears to be 2 mm larger when compared to study dating February 17, 2021. The ascending aorta now measures 5.5 cm. Previously the arch measured 5.3 cm. 4.Note is again made of left lower small pleural effusion with bilateral basilar opacities which may represent atelectasis. 5.Note is again made of a large cystic mass in the pelvis. DIAGNOSIS: Previous cystic lesion adjacent to the ascending colon status post drain placement      Bibasilar atelectasis with left small pleural effusion. Underlying nodules cannot be excluded. The heart is enlarged. The liver is of normal size and attenuation without focal lesions. The spleen is of normal size and attenuation without focal lesions. Pancreas shows no signs of focal mass or peripancreatic fluid collection. Kidneys are normal in size. There is no hydronephrosis. No renal mass is identified. Adrenal glands are unremarkable. Note is again made of a thoracoabdominal aortic aneurysm.  The ascending aorta now measures 5.5 cm, this is larger when compared to prior study dating 5.3 cm. 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 This large thrombus was not seen on prior study. No significant retroperitoneal adenopathy or ascites is identified. Again seen is a cystic lesion involving the proximal ascending aorta which now appears to be more septated than prior. The previously placed drain is now in the periphery of the lesion. CT PELVIS:  A large cystic mass is again seen in the lower pelvis. CT ABDOMEN PELVIS  : 7/18/2022    Residual intravenous contrast medium from recent CT examination identified. Lungs:  Lung bases clear. Cardiac size enlarged, unchanged. Calcification at level of mitral valve. Small hiatal hernia. Liver:  Normal in size, shape, and attenuation. Bile Ducts:  Normal in caliber. Gallbladder:  No stones or wall thickening. Pancreas:  Normal without masses, cysts, ductal dilatation or calcification. Spleen:  Normal in size without masses or calcifications. No splenules. Kidneys:  Normal in size. No hydronephrosis, masses, or stones. Adrenals:  Normal. Small bowel:  Normal in caliber. See \"peritoneum \". Appendix:  Not visualized. Colon:  Normal in caliber. See \"peritoneum \". Peritoneum:  No free air. A bilobed, 10.0 x 8.0 x 10.6 cm mass having central rounded septated areas of low attenuation, 3.7 x 3.5 x 5.4 cm, and displacing cecum and ascending colon anteriorly (series 2, image 50, series 601, image 54, series 602, image 36). No calcification identified. Trace adjacent fat stranding demonstrated. Vessels: Aorta normal in course and caliber. Lymph nodes:  Retroperitoneal:  No enlarged retroperitoneal lymph nodes. Mesenteric:  No enlarged mesenteric lymph nodes. Pelvic: No enlarged pelvic lymph nodes. Ureters: Normal in course and caliber. No calcifications. Bladder: No wall thickening.  Reproductive organs: 8.0 x 5.8 x 7.5 cm left adnexal cyst displacing urinary bladder right and laterally, anteriorly, and inferiorly (series 2, image 68, series 601, image 42, series 602, image 52). Abdominal Wall: Fat identified bilateral inguinal canals. Bones:  No bone lesions. Multilevel disc space narrowing lumbar spine. No post operative changes. Complex 10.0 x 8.0 x 10.6 cm mass with septated central low attenuation fluid collections as discussed. Mass displaces cecum and ascending colon anteriorly. Differential includes malignancy including appendiceal mucocele/carcinoma, as well as retroperitoneal sarcoma with central necrotic change. Infection/abscess secondary consideration, minimal presence of adjacent inflammatory change. 8.0 x 5.8 x 7.5 cm left adnexal cysts as discussed, most likely ovarian in etiology. If clinical concern warrants, pelvic sonography may be obtained for further evaluation. Other findings discussed. CT Head   7/18/2022  1. Age-appropriate cortical atrophy and periventricular microangiopathy. When compared to previous study there has been no significant interval change. 2. No acute hemorrhage or extra-axial fluid collection            CT ABDOMEN PELVIS   7/18/2022     1. In the right lower quadrant of the abdomen, pericolic abscesses versus pericolic mass is seen. This is thought more likely abscess. 2. A cystic mass is again seen in the pelvis and is likely in the ovary. It is unchanged from previous examination The patient will be given oral contrast and rescanned to evaluate the right lower quadrant. Previous extensive, complex labs, notes and diagnostics reviewed and analyzed.      ALLERGIES:    Allergies as of 08/03/2022 - Fully Reviewed 08/03/2022   Allergen Reaction Noted    Latex  07/19/2017    Tape [adhesive tape]  06/28/2016      (please also verify by checking [facility])       Complex Physical Medicine & Rehab Issues Assess & Plan:   Severe abnormality of gait and mobility and impaired self-care and ADL's secondary to August 17, 2022 to home alone with help from her son with home health care and video monitor. We are suggesting that patient should not be home alone for more than a few minutes. Apparently patient and family do not have that option. We are hoping that patient can go stay with one of her children though she is quite against the idea. He is competent to make her own decisions. Weekly team meeting every Monday to re-assess progress towards goals, discuss and address social, psychological and medical comorbidities and to address difficulties they may be having progressing in therapy. Patient and family education is in progress. The patient is to follow-up with their family physician after discharge. Complex Active General Medical Issues that complicate care Assess & Plan:     A-fib,   Essential hypertension, Acute on chronic combined systolic (congestive) and diastolic (congestive) heart failure-Acute rehab to monitor heart rate and rhythm with the option of telemetry and the effects of chronotropic medication with respect to increasing physical activity and exercise in PT, OT, ADLs with medication titration to lowest effective dosing. Continue blood signs every shift focusing on heart rate, rhythm and blood pressure checks with orthostatic checks-monitoring the effect of exercise, therapy and posture. Consult hospitalist for backup medical and adjust/add medications (Lovenox transitioning to Coumadin patient had been on Xarelto, Coreg). Monitor heart rate and blood pressure as well as medications effects on vital signs before during and after therapy with especial focus on preventing orthostasis and falls risk. Hyponatremia-recheck BMP avoid excessive water  Anemia-Monitor vital signs monitor for orthostasis and tachycardia, check H&H prn. Vitamin B12 and iron-dose iron with food to prevent GI upset. Monitor for constipation. Monitor stools for blood.     Hypothyroid-  Synthroid and titrate dosing. Follow vital signs, recheck TSH and thyroid studies as outpatient. 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 Osteoarthritis of lumbar spine with myelopathy-her extremity strengthening    Depression-emotional support provided daily, vitamin B12, encourage participation in rehabilitation support group and recreational therapy, adjust/add medications (B12, Celexa)  Abdominal abscess versus cancer-drain still in place pending cytology-patient is on IV Merrem every 8 hours  SP cultures-Pt has right abd abscess/mass. S/p drainage. SHe also has enlarging Thoraci Aortic   Aneurysm with large mural thrombus burden. Per GI surgery Dr. Linda Crump he would like to keep the drain is long as possible. Do not remove the drain. GERD-Elevate head of bed after meals, monitor stools for blood, lowest effective dose of PPI, consider Tums. Yeast rash and immunosuppression-Micatin powder and Floranex   AA now at 5.5cm up from 5.3cm but now there is a new large mural thrombus involving the descending portion of the aortic arch and extending how to the lower thoracic aorta along with large cystic mass. The thrombus was not seen on previous study. Patient had been on xarelto and now on Lovenox transitioning to Coumadin. . Given her abdominal pathology and large thrombus however patient due to her advanced age and high risk for any surgical intervention she is electing for conservative care. Dizziness and gait ataxia-She is getting dizzy especially if he eats she does not get her Antivert in the morning and therapy starts before she can get the Antivert therefore I have asked my shift to give and schedule basis before breakfast and before shift change. Focus on emotional health and caregiver involvement in care.           Electronically signed by Laura Bonds DO on 8/5/22 at 4:51 AM EDT Lalitha Webster D.O., PM&R     Attending    [address]

## 2022-08-12 NOTE — PROGRESS NOTES
Physical Therapy Rehab Treatment Note  [facility]       NAME: Tianna Laguna  : 1932 (80 y.o.)  MRN: [id number]  CODE STATUS: DNR-CC    Date of Service: 2022       Restrictions:  Restrictions/Precautions: Fall Risk       SUBJECTIVE:   Subjective: Pt reports she is tired. Pain  Pain: Pt deniees pain. OBJECTIVE:         Bed mobility  Rolling to Left: Modified independent  Rolling to Right: Modified independent  Supine to Sit: Modified independent  Sit to Supine: Modified independent    Transfers  Sit to Stand: Supervision  Stand to sit: Supervision  Chair to bed supervision    Ambulation  WB Status: wbat  Ambulation  Surface: carpet  Device: Rolling Walker  Other Apparatus: O2  Assistance: Supervision  Gait Deviations: Slow Lea;Decreased step length  Distance: 60ft        PT Exercises  A/AROM Exercises: seated: LAQs x20, march x20, hip add with ball x20, hip abd RTB x20, ham curl RTB x20           ASSESSMENT/PROGRESS TOWARDS GOALS:   Body Structures, Functions, Activity Limitations Requiring Skilled Therapeutic Intervention: Decreased functional mobility ; Decreased strength;Decreased posture;Decreased balance;Decreased endurance;Decreased safe awareness;Decreased ADL status; Decreased ROM; Decreased coordination  Assessment: Fatigued this afternoon. Goals:  Long Term Goals  Long term goal 1: Pt to complete bed mobility with indep  Long term goal 2: Pt to complete transfers with indep  Long term goal 3: Pt to ambulate 50ft with LRD and indep  Long term goal 4: Pt to manage 2 steps with B HR and Supervision  Long term goal 5: Pt to complete TUG within 20sec to demonstrate improved functional mobility/balance  Patient Goals   Patient goals : does not state, family wishes pt to regain her indep    PLAN OF CARE/Safety:   Plan Comment: Cont per POC.       Therapy Time:   Individual   Time In 1500   Time Out 1530   Minutes 30     Minutes:  Transfer/Bed mobility trainin  Gait training:10  Neuro re education:0  Therapeutic ex:15    Bj Rasheed PTA, 22 at 3:18 PM

## 2022-08-12 NOTE — PROGRESS NOTES
OCCUPATIONAL THERAPY  INPATIENT REHAB TREATMENT NOTE  [facility]      NAME: Harleen Valencia  : 1932 (80 y.o.)  MRN: [id number]  CODE STATUS: DNR-CC  Room: R250R250-01    Date of Service: 2022    Referring Physician: Dr. Russell Jacobo Diagnosis: Impaired mobility and ADL's due to Charcot Kanchan Garbe Tooth Neuropathy    Restrictions  Restrictions/Precautions  Restrictions/Precautions: Fall Risk         Position Activity Restriction  Other position/activity restrictions: Abdominal drain    Patient's date of birth confirmed: Yes    SAFETY:   All fall risk precautions in place    SUBJECTIVE:  I want to clean myself    Pain at start of treatment: No    Pain at end of treatment: No    Location:   Nursing notified: Not Applicable  RN:   Intervention: None    COGNITION:  Orientation  Overall Orientation Status: Within Functional Limits    OBJECTIVE:    Observation/Palpation  Observation: Pt sitting up in w/c with feet up on wooden block, in gown, wanting to get dressed. ADL:  Pt participating in ADLs seated in w/c and in standing by w/c using w/w for dynamic standing balance, CGA. Pt dep to don/doff socks and shoes. Mod A to don pants over feet and ring to knees. Set up for UB dressing. Set up for grooming. Education:   Educated pt on POC    Equipment recommendations:  OT Equipment Recommendations  Other: Continue to assess      ASSESSMENT:  Assessment: Pt able to participate in her self care  Activity Tolerance: Patient tolerated treatment well      PLAN OF CARE:  Strengthening, Balance training, Functional mobility training, Neuromuscular re-education, Endurance training, Self-Care / ADL, Cognitive/Perceptual training, Safety education & training, Home management training, Patient/Caregiver education & training  Continue per OT POC for planned d/c on 22    Patient goals : \"to be able to wash my back and my feet. \"  Time Frame for Long term goals : 2 weeks  Long Term Goal 1: Pt within two weeks of evaluation will demonstrate progress to goals to address areas as stated below to increase functional independence for return to PLOF  Long Term Goal 2: Pt will increase independence with ADL Pt will increase independence with ADL transfers  Long Term Goal 3: Pt will increase bilateral UE strength to increase ease of ADL transfers    Therapy Time:   Individual Group Co-Treat   Time In 1030       Time Out 1100         Minutes 30           ADL/IADL trainin minutes     Electronically signed by:    Gabby Norman OT,   2022, 11:21 AM

## 2022-08-12 NOTE — PROGRESS NOTES
Progress Note  Patient: [employer]  Unit/Bed: R250/R250-01  YOB: 1932  MRN: [id number]  Acct: [de-identified]   Admitting Diagnosis: Impaired mobility and activities of daily living [Z74.09, Z78.9]  Impaired mobility and ADLs [Z74.09, Z78.9]  Admit Date:  8/3/2022  Hospital Day: 9    Chief Complaint:  CAD    Histories:  Past Medical History:   Diagnosis Date    Ascending aortic aneurysm ([address] 6/23/2017    Charcot Arleen Tooth muscular atrophy     CHF (congestive heart failure) (HCC)     Chronic diastolic CHF (congestive heart failure) ([address] 4/1/2022    Depression     Descending aortic aneurysm ([facility] 6/23/2017    Essential hypertension 2/16/2018    Headache     HTN (hypertension)     Impaired mobility and activities of daily living     Lumbar stenosis with neurogenic claudication     Myelopathy ([address]     Osteoarthritis      Past Surgical History:   Procedure Laterality Date    JOINT REPLACEMENT Bilateral     knees    OTHER SURGICAL HISTORY Right 07/21/2022    cat scan guided abdominal mass aspiration with drain placement by Dr. Yuri Sanabria Left 02/25/2021    LEFT PLEURAL CATHETER INSERTION performed by Cliff Mathis MD at [place] [id number] Left 01/29/2021    total of 755 cc removed per Dr Brennan Rey specimen sent to lab     Family History   Problem Relation Age of Onset    Arthritis Mother     Arthritis Father     High Blood Pressure Father      Social History     Socioeconomic History    Marital status:       Spouse name: None    Number of children: 2    Years of education: None    Highest education level: None   Tobacco Use    Smoking status: Never    Smokeless tobacco: Never   Substance and Sexual Activity    Alcohol use: No     Alcohol/week: 0.0 standard drinks    Drug use: No   Social History Narrative    , Lives With: Alone, son lives down the street, dtr is in the area    Type of Home: [facility]  in [address]: One level    Home Access: Stairs to enter with rails- Number of Steps: 2- Rails: Both    Bathroom Shower/Tub: Tub/Shower unit, Bathroom Equipment: Grab bars in shower, Shower chair    Home Equipment: Rolling walker, Cane(Pt infrequemtly uses DME for ambulation and prefers to furniture walk in home)    ADL Assistance: [address]: Independent    Homemaking Responsibilities: Yes    Ambulation Assistance: Independent, Transfer Assistance: Independent    Additional Comments: Son stops over 2 times daily           Subjective/HPI  No new events doing  vitals stable INR 2.2 this am.   Lying falt comfortable no cp no sob. Wants abd drain tube out. EKG:        Review of Systems:   Review of Systems   Constitutional: Negative. Negative for diaphoresis and fatigue. HENT: Negative. Eyes: Negative. Respiratory: Negative. Negative for cough, chest tightness, shortness of breath, wheezing and stridor. Cardiovascular: Negative. Negative for chest pain, palpitations and leg swelling. Gastrointestinal: Negative. Negative for blood in stool and nausea. Genitourinary: Negative. Musculoskeletal:  Positive for arthralgias, back pain and gait problem. Skin: Negative. Neurological:  Positive for weakness. Negative for dizziness, syncope and light-headedness. Hematological: Negative. Psychiatric/Behavioral: Negative. Physical Examination:    BP (!) 117/57   Pulse 52   Temp 98.1 °F (36.7 °C) (Oral)   Resp 17   Ht 5' (1.524 m)   Wt 159 lb 3.2 oz (72.2 kg)   SpO2 96%   BMI 31.09 kg/m²    Physical Exam   Constitutional: No distress. She appears chronically ill. HENT:   Normal cephalic and Atraumatic   Eyes: Pupils are equal, round, and reactive to light. Neck: Thyroid normal. No JVD present. No neck adenopathy. No thyromegaly present. Cardiovascular: Normal rate, regular rhythm, intact distal pulses and normal pulses. Murmur heard.   Pulmonary/Chest: Effort normal and breath sounds normal. She has no wheezes. She has no rales. She exhibits no tenderness. Abdominal: Soft. Bowel sounds are normal. There is no abdominal tenderness. Musculoskeletal:         General: No tenderness or edema. Normal range of motion. Cervical back: Normal range of motion and neck supple. Neurological: She is alert and oriented to person, place, and time. Skin: Skin is warm. No cyanosis. Nails show no clubbing.      LABS:  CBC:   Lab Results   Component Value Date/Time    WBC 6.2 08/04/2022 03:28 AM    RBC 3.62 08/04/2022 03:28 AM    HGB 10.1 08/04/2022 03:28 AM    HCT 30.4 08/04/2022 03:28 AM    MCV 83.9 08/04/2022 03:28 AM    MCH 27.9 08/04/2022 03:28 AM    MCHC 33.3 08/04/2022 03:28 AM    RDW 15.8 08/04/2022 03:28 AM     08/04/2022 03:28 AM     CBC with Differential:    Lab Results   Component Value Date/Time    WBC 6.2 08/04/2022 03:28 AM    RBC 3.62 08/04/2022 03:28 AM    HGB 10.1 08/04/2022 03:28 AM    HCT 30.4 08/04/2022 03:28 AM     08/04/2022 03:28 AM    MCV 83.9 08/04/2022 03:28 AM    MCH 27.9 08/04/2022 03:28 AM    MCHC 33.3 08/04/2022 03:28 AM    RDW 15.8 08/04/2022 03:28 AM    BANDSPCT 2 07/24/2022 05:00 AM    METASPCT 2 07/24/2022 05:00 AM    LYMPHOPCT 7.0 07/24/2022 05:00 AM    MONOPCT 5.8 07/24/2022 05:00 AM    BASOPCT 1.0 07/24/2022 05:00 AM    MONOSABS 0.7 07/24/2022 05:00 AM    LYMPHSABS 0.8 07/24/2022 05:00 AM    EOSABS 0.0 07/24/2022 05:00 AM    BASOSABS 0.1 07/24/2022 05:00 AM     CMP:    Lab Results   Component Value Date/Time     08/04/2022 03:28 AM    K 3.9 08/04/2022 03:28 AM    K 3.2 07/24/2022 05:00 AM     08/04/2022 03:28 AM    CO2 27 08/04/2022 03:28 AM    BUN 14 08/04/2022 03:28 AM    CREATININE 0.56 08/04/2022 03:28 AM    GFRAA >60.0 08/04/2022 03:28 AM    LABGLOM >60.0 08/04/2022 03:28 AM    GLUCOSE 110 08/04/2022 03:28 AM    PROT 6.0 07/24/2022 05:00 AM    LABALBU 3.0 08/04/2022 03:28 AM    CALCIUM 8.8 08/04/2022 03:28 AM    BILITOT 0.3 07/24/2022 05:00 AM    ALKPHOS 51 07/24/2022 05:00 AM    AST 20 07/24/2022 05:00 AM    ALT 19 07/24/2022 05:00 AM     BMP:    Lab Results   Component Value Date/Time     08/04/2022 03:28 AM    K 3.9 08/04/2022 03:28 AM    K 3.2 07/24/2022 05:00 AM     08/04/2022 03:28 AM    CO2 27 08/04/2022 03:28 AM    BUN 14 08/04/2022 03:28 AM    LABALBU 3.0 08/04/2022 03:28 AM    CREATININE 0.56 08/04/2022 03:28 AM    CALCIUM 8.8 08/04/2022 03:28 AM    GFRAA >60.0 08/04/2022 03:28 AM    LABGLOM >60.0 08/04/2022 03:28 AM    GLUCOSE 110 08/04/2022 03:28 AM     Magnesium:    Lab Results   Component Value Date/Time    MG 2.1 08/03/2022 05:18 AM     Troponin:    Lab Results   Component Value Date/Time    TROPONINI <0.010 07/18/2022 12:30 PM        Active Hospital Problems    Diagnosis Date Noted    A-fib [facility]) [I48.91]      Priority: High    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 [Z74.09, Z78.9]      Priority: High    Abscess of abdominal cavity ([facility] [K65.1] 08/09/2022     Priority: Medium    Intra-abdominal abscess ([facility] [K65.1] 08/08/2022     Priority: Medium    Abdominal pain [R10.9] 07/27/2022     Priority: Medium    Lumbar stenosis with neurogenic claudication [M48.062] 06/02/2016     Priority: Medium    Chronic diastolic CHF (congestive heart failure) ([facility] [address] [I50.32] 04/01/2022     Priority: Low    Acute on chronic combined systolic and diastolic CHF (congestive heart failure) ([address] [I50.43] 01/25/2021     Priority: Low    Descending aortic aneurysm ([facility] [address] [I71.9] 06/23/2017     Priority: Low        Assessment/Plan:  Impression/Plan:   Abd Abscess/Mass - Drain tube in place   LVEF 60  Symptoms of Orthopnea- lungs are clear no JVD nor peripheral edema. - will give Empiric low dose Lasix. Will need periodic Lab at Rehab  Frequent PAF - rate is controlled on exam.  CAD- moderate - no angina. Continue BB  HTN Stable but elevated this am. Will observe.    HPL - statin on hold  Ascending AO aneurysm-  had increased in size and measures 5.3cm distal arch. 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 Xarelto stopped and started Heparin. Had long discussion with pt and daughter. They no longer want aggressive Rx and does not want to be transferred to [facility]. DC Lovenox. Keep INR 2-3.   2.2 this am.          Electronically signed by Quan Baez MD on 8/12/2022 at 9:57 AM

## 2022-08-12 NOTE — PROGRESS NOTES
Physical Therapy Rehab Treatment Note  [facility]Department[facility]  [facility]       NAME: Ag Ricci  : 1932 (80 y.o.)  MRN: [id number]  CODE STATUS: DNR-CC    Date of Service: 2022       Restrictions:  Restrictions/Precautions: Fall Risk       SUBJECTIVE: \"I'm ok\"     Pain   Pt denies pain    OBJECTIVE:         Bed mobility  Rolling to Left: Modified independent  Rolling to Right: Modified independent  Supine to Sit: Modified independent  Sit to Supine: Modified independent    Transfers  Sit to Stand: Supervision  Stand to sit: Supervision  Car Transfer: Stand by assistance (VCs/TCs after demo for safest technique.)    Ambulation  WB Status: wbat  Ambulation  Surface: carpet  Device: Rolling Walker  Other Apparatus: O2  Assistance: Supervision  Gait Deviations: Slow Lea;Decreased step length  Distance: 60ft    Stairs/Curb  Stairs?: Yes  Stairs  # Steps : 4  Stairs Height: 6\"  Rails: Bilateral  Assistance: Stand by assistance  Comment: Pt attempted to adjust mask while descending stairs becoming unsteady requiring CGA with 1 UE support. PT Exercises  A/AROM Exercises: SAQ x20, heel slides x10 bridges x10, hooklying march x10, SLR x10, hip add with ball x20, hip abd RTB x20  Dynamic Standing Balance Exercises: Standing and ambulatory functional balance tasks in bathroom managing pants, door, curtain, washing hands. Requires 1 UE support. SBA            ASSESSMENT/PROGRESS TOWARDS GOALS:   Body Structures, Functions, Activity Limitations Requiring Skilled Therapeutic Intervention: Decreased functional mobility ; Decreased strength;Decreased posture;Decreased balance;Decreased endurance;Decreased safe awareness;Decreased ADL status; Decreased ROM; Decreased coordination  Assessment: Pt cont to require assist for management of O2 line.   Goals:  Long Term Goals  Long term goal 1: Pt to complete bed mobility with indep  Long term goal 2: Pt to complete transfers with indep  Long term goal 3: Pt to ambulate 50ft with LRD and indep  Long term goal 4: Pt to manage 2 steps with B HR and Supervision  Long term goal 5: Pt to complete TUG within 20sec to demonstrate improved functional mobility/balance  Patient Goals   Patient goals : does not state, family wishes pt to regain her indep    PLAN OF CARE/Safety:   Plan Comment: Cont per POC.     Therapy Time:   Individual   Time In 1100   Time Out 1200   Minutes 60     Minutes:  Transfer/Bed mobility training:15  Gait training:15  Neuro re education:10  Therapeutic ex:20      Mignon Mccall PTA, 08/12/22 at 12:28 PM

## 2022-08-13 LAB
INR BLD: 2.7
PROTHROMBIN TIME: 27.8 SEC (ref 12.3–14.9)

## 2022-08-13 PROCEDURE — 2580000003 HC RX 258: Performed by: INTERNAL MEDICINE

## 2022-08-13 PROCEDURE — 6370000000 HC RX 637 (ALT 250 FOR IP): Performed by: PHYSICAL MEDICINE & REHABILITATION

## 2022-08-13 PROCEDURE — 1180000000 HC REHAB R&B

## 2022-08-13 PROCEDURE — 85610 PROTHROMBIN TIME: CPT

## 2022-08-13 PROCEDURE — 36415 COLL VENOUS BLD VENIPUNCTURE: CPT

## 2022-08-13 PROCEDURE — 2700000000 HC OXYGEN THERAPY PER DAY

## 2022-08-13 PROCEDURE — 6370000000 HC RX 637 (ALT 250 FOR IP): Performed by: INTERNAL MEDICINE

## 2022-08-13 PROCEDURE — 6360000002 HC RX W HCPCS: Performed by: INTERNAL MEDICINE

## 2022-08-13 PROCEDURE — 6370000000 HC RX 637 (ALT 250 FOR IP)

## 2022-08-13 RX ORDER — WARFARIN SODIUM 5 MG/1
7.5 TABLET ORAL
Status: COMPLETED | OUTPATIENT
Start: 2022-08-13 | End: 2022-08-13

## 2022-08-13 RX ADMIN — LACTOBACILLUS TAB 2 TABLET: TAB at 21:20

## 2022-08-13 RX ADMIN — CARVEDILOL 6.25 MG: 6.25 TABLET, FILM COATED ORAL at 08:32

## 2022-08-13 RX ADMIN — MEROPENEM 1000 MG: 1 INJECTION, POWDER, FOR SOLUTION INTRAVENOUS at 07:22

## 2022-08-13 RX ADMIN — MEROPENEM 1000 MG: 1 INJECTION, POWDER, FOR SOLUTION INTRAVENOUS at 13:40

## 2022-08-13 RX ADMIN — CITALOPRAM HYDROBROMIDE 20 MG: 10 TABLET ORAL at 08:32

## 2022-08-13 RX ADMIN — WARFARIN SODIUM 7.5 MG: 5 TABLET ORAL at 18:51

## 2022-08-13 RX ADMIN — MECLIZINE 12.5 MG: 12.5 TABLET ORAL at 07:11

## 2022-08-13 RX ADMIN — MICONAZOLE NITRATE: 2 POWDER TOPICAL at 21:21

## 2022-08-13 RX ADMIN — FUROSEMIDE 20 MG: 20 TABLET ORAL at 08:32

## 2022-08-13 RX ADMIN — Medication 10 ML: at 21:22

## 2022-08-13 RX ADMIN — HYDROCORTISONE: 25 CREAM TOPICAL at 08:33

## 2022-08-13 RX ADMIN — LACTOBACILLUS TAB 2 TABLET: TAB at 13:37

## 2022-08-13 RX ADMIN — CARVEDILOL 6.25 MG: 6.25 TABLET, FILM COATED ORAL at 18:51

## 2022-08-13 RX ADMIN — CLOTRIMAZOLE: 1 CREAM TOPICAL at 21:21

## 2022-08-13 RX ADMIN — MICONAZOLE NITRATE: 2 POWDER TOPICAL at 08:34

## 2022-08-13 RX ADMIN — PANTOPRAZOLE SODIUM 40 MG: 40 TABLET, DELAYED RELEASE ORAL at 07:12

## 2022-08-13 RX ADMIN — LACTOBACILLUS TAB 2 TABLET: TAB at 08:32

## 2022-08-13 RX ADMIN — Medication 100 MG: at 18:51

## 2022-08-13 RX ADMIN — CLOTRIMAZOLE: 1 CREAM TOPICAL at 08:34

## 2022-08-13 RX ADMIN — LEVOTHYROXINE SODIUM 88 MCG: 88 TABLET ORAL at 08:32

## 2022-08-13 RX ADMIN — Medication 2000 UNITS: at 18:51

## 2022-08-13 RX ADMIN — Medication 10 ML: at 08:33

## 2022-08-13 RX ADMIN — MEROPENEM 1000 MG: 1 INJECTION, POWDER, FOR SOLUTION INTRAVENOUS at 21:24

## 2022-08-13 ASSESSMENT — PAIN SCALES - GENERAL: PAINLEVEL_OUTOF10: 0

## 2022-08-13 NOTE — PROGRESS NOTES
Warfarin Dosing - Pharmacy Consult Note  Consulting Provider: VLFYFY  Indication:   thrombus  Warfarin Dose prior to admission: New start (has been receiving 7.5mg daily)  Concurrent anticoagulants/antiplatelets: na  Significant Drug Interactions: Celexa  Recent Labs     08/11/22  0430 08/12/22  0436 08/13/22  0530   INR 2.2 2.2 2.7     Recent warfarin administrations                     warfarin (COUMADIN) tablet 7.5 mg (mg) 7.5 mg Given 08/12/22 1740    warfarin (COUMADIN) tablet 7.5 mg (mg) 7.5 mg Given 08/11/22 1707    warfarin (COUMADIN) tablet 7.5 mg (mg) 7.5 mg Given 08/10/22 1832                   Date       INR    Dose  08/08/22    1.9               7.5 mg  08/09/22    2.0               7.5 mg  08/10/22    2.1               7.5 mg  08/11/22    2.2               7.5 mg  08/12/22    2.2               7.5 mg  08/13/22    2.7               7.5 mg    Assessment/Plan  (Goal INR: 2 - 3)  INR therapeutic today at 2.2. New start to warfarin. Regimen appears to be therapeutic on 7.5 mg daily  Will order warfarin 7.5 mg again for today    Active problem list reviewed. INR orders are placed. Chart reviewed for pertinent labs, drug/diet interactions, and past doses. Documentation of patient's clinical condition was reviewed. Pharmacy Dosing:  Pharmacy will continue to follow.      Hedy Boothe, PharmJAYLENE  8/13/2022 1:54 PM

## 2022-08-13 NOTE — PROGRESS NOTES
Pt resting quietly shift assess complete call light within reach bed alarm on will continue to monitor. Electronically signed by Isi Gore LPN on 5/88/0817 at 9:65 AM

## 2022-08-14 LAB
INR BLD: 2.9
PROTHROMBIN TIME: 29.5 SEC (ref 12.3–14.9)

## 2022-08-14 PROCEDURE — 6370000000 HC RX 637 (ALT 250 FOR IP): Performed by: PHYSICAL MEDICINE & REHABILITATION

## 2022-08-14 PROCEDURE — 6370000000 HC RX 637 (ALT 250 FOR IP): Performed by: INTERNAL MEDICINE

## 2022-08-14 PROCEDURE — 2580000003 HC RX 258: Performed by: INTERNAL MEDICINE

## 2022-08-14 PROCEDURE — 97535 SELF CARE MNGMENT TRAINING: CPT

## 2022-08-14 PROCEDURE — 6360000002 HC RX W HCPCS: Performed by: INTERNAL MEDICINE

## 2022-08-14 PROCEDURE — 97110 THERAPEUTIC EXERCISES: CPT

## 2022-08-14 PROCEDURE — 2700000000 HC OXYGEN THERAPY PER DAY

## 2022-08-14 PROCEDURE — 36415 COLL VENOUS BLD VENIPUNCTURE: CPT

## 2022-08-14 PROCEDURE — 1180000000 HC REHAB R&B

## 2022-08-14 PROCEDURE — 85610 PROTHROMBIN TIME: CPT

## 2022-08-14 RX ORDER — WARFARIN SODIUM 5 MG/1
5 TABLET ORAL
Status: COMPLETED | OUTPATIENT
Start: 2022-08-14 | End: 2022-08-14

## 2022-08-14 RX ADMIN — Medication 2000 UNITS: at 17:25

## 2022-08-14 RX ADMIN — CITALOPRAM HYDROBROMIDE 20 MG: 10 TABLET ORAL at 08:14

## 2022-08-14 RX ADMIN — PANTOPRAZOLE SODIUM 40 MG: 40 TABLET, DELAYED RELEASE ORAL at 05:49

## 2022-08-14 RX ADMIN — CARVEDILOL 6.25 MG: 6.25 TABLET, FILM COATED ORAL at 17:26

## 2022-08-14 RX ADMIN — Medication 10 ML: at 22:42

## 2022-08-14 RX ADMIN — Medication 100 MG: at 17:25

## 2022-08-14 RX ADMIN — Medication 10 ML: at 13:55

## 2022-08-14 RX ADMIN — MEROPENEM 1000 MG: 1 INJECTION, POWDER, FOR SOLUTION INTRAVENOUS at 13:55

## 2022-08-14 RX ADMIN — LACTOBACILLUS TAB 2 TABLET: TAB at 08:14

## 2022-08-14 RX ADMIN — CLOTRIMAZOLE: 1 CREAM TOPICAL at 22:29

## 2022-08-14 RX ADMIN — CLOTRIMAZOLE: 1 CREAM TOPICAL at 13:56

## 2022-08-14 RX ADMIN — HYDROCORTISONE: 25 CREAM TOPICAL at 22:29

## 2022-08-14 RX ADMIN — MECLIZINE 12.5 MG: 12.5 TABLET ORAL at 05:49

## 2022-08-14 RX ADMIN — LACTOBACILLUS TAB 2 TABLET: TAB at 13:50

## 2022-08-14 RX ADMIN — FERROUS SULFATE TAB 325 MG (65 MG ELEMENTAL FE) 325 MG: 325 (65 FE) TAB at 17:26

## 2022-08-14 RX ADMIN — WARFARIN SODIUM 5 MG: 5 TABLET ORAL at 17:25

## 2022-08-14 RX ADMIN — CARVEDILOL 6.25 MG: 6.25 TABLET, FILM COATED ORAL at 08:14

## 2022-08-14 RX ADMIN — MECLIZINE 12.5 MG: 12.5 TABLET ORAL at 08:19

## 2022-08-14 RX ADMIN — MEROPENEM 1000 MG: 1 INJECTION, POWDER, FOR SOLUTION INTRAVENOUS at 22:34

## 2022-08-14 RX ADMIN — FUROSEMIDE 20 MG: 20 TABLET ORAL at 08:13

## 2022-08-14 RX ADMIN — LEVOTHYROXINE SODIUM 88 MCG: 88 TABLET ORAL at 08:14

## 2022-08-14 RX ADMIN — MEROPENEM 1000 MG: 1 INJECTION, POWDER, FOR SOLUTION INTRAVENOUS at 05:51

## 2022-08-14 RX ADMIN — MECLIZINE 12.5 MG: 12.5 TABLET ORAL at 17:29

## 2022-08-14 RX ADMIN — MICONAZOLE NITRATE: 2 POWDER TOPICAL at 22:29

## 2022-08-14 RX ADMIN — LACTOBACILLUS TAB 2 TABLET: TAB at 22:28

## 2022-08-14 RX ADMIN — MICONAZOLE NITRATE: 2 POWDER TOPICAL at 13:56

## 2022-08-14 NOTE — PROGRESS NOTES
Physical Therapy Rehab Treatment Note  [place] [facility]  Room: [place] [address]       NAME: Marcella Malik  : 1932 (80 y.o.)  MRN: [id number]  CODE STATUS: DNR-CC    Date of Service: 2022       Restrictions:  Restrictions/Precautions: Fall Risk  Position Activity Restriction  Other position/activity restrictions: Abdominal drain       SUBJECTIVE:   Subjective: Pt agreeable to PT tx    Pain  Pain: Pt denies pain. OBJECTIVE:       Bed Mobility  Additional Factors: Head of bed flat; With handrails;Verbal cues; Increased time to complete  Roll Left  Assistance Level: Stand by assist  Roll Right  Assistance Level: Stand by assist  Sit to Supine  Assistance Level: Stand by assist  Supine to Sit  Assistance Level: Supervision  Scooting  Assistance Level: Minimal assistance  Skilled Clinical Factors: requires minimal assistance when supine to scoot to [facility]    Transfers  Surface: From bed; To bed  Additional Factors: Hand placement cues  Device: Walker  Sit to Stand  Assistance Level: Supervision  Stand to Sit  Assistance Level: Supervision  Skilled Clinical Factors: cues for improving overall quality with ww placement    Ambulation  Surface: Level surface  Device: Rolling walker  Distance: 100', 20' x 2  Additional Factors: Set-up; Verbal cues  Assistance Level: Stand by assist  Gait Deviations: Slow raji;Decreased step length bilateral;Decreased heel strike right;Decreased heel strike left; Wide base of support  Skilled Clinical Factors: Patient with AFO's donned, mild ff posture, good manuevering around objects      PT Exercises  Exercise Treatment: STS x5 from bed  A/AROM Exercises: Seated: marching, LAQ, hip abd side kicks x10 ea with no back support  Static Standing Balance Exercises: static standing no UE support, pt requires at least 1 UE support to maintain x30\" with FA, max 6 seconds without UE support         ASSESSMENT/PROGRESS TOWARDS GOALS:   Assessment  Assessment: Pt challenged this session with static standing balance. Pt experiences mod instability when performing standing and no UE support. Cues provided for improving scooting technique when supine in bed to [facility] with poor follow through. Encouraged pt to perform on back while performing bridging, pt prefers to scoot while lying on L side. Goals:  Long Term Goals  Long term goal 1: Pt to complete bed mobility with indep  Long term goal 2: Pt to complete transfers with indep  Long term goal 3: Pt to ambulate 50ft with LRD and indep  Long term goal 4: Pt to manage 2 steps with B HR and Supervision  Long term goal 5: Pt to complete TUG within 20sec to demonstrate improved functional mobility/balance    PLAN OF CARE/Safety:   Plan Comment: Cont per POC.       Therapy Time:   Individual   Time In 1350   Time Out 1420   Minutes 30     Minutes:  Transfer/Bed mobility training: 10  Gait training: 10  Neuro re education: 0  Therapeutic ex: [id number] Patricia Alvarado PTA, 08/14/22 at 2:29 PM

## 2022-08-14 NOTE — PROGRESS NOTES
Assessment complete see specifics. Pt asking that RUQ drain be removed prior to d/c home, explained to pt that the drain must stay in place as long as it is draining fluid per the doctor. Scant drainage at this time, will educate pt on managing accordion drain when there is enough fluid to drain. Pt denies pain, oxygen 3L/min per nasal cannula in place. Pt reports a BM earlier this morning.

## 2022-08-14 NOTE — PROGRESS NOTES
Warfarin Dosing - Pharmacy Consult Note  Consulting Provider: Dr. Balbir Rhodes  Indication: Thrombus  Warfarin Dose prior to admission: New start; 8/2 (has been receiving 7.5 mg daily since 8/4)  Concurrent anticoagulants/antiplatelets: None  Significant Drug Interactions: No obvious interactions and Celexa (has been on since initiation) , Merrem (started 8/9)    Recent Labs     08/12/22  0436 08/13/22  0530 08/14/22  0509   INR 2.2 2.7 2.9     Recent warfarin administrations                     warfarin (COUMADIN) tablet 7.5 mg (mg) 7.5 mg Given 08/13/22 1851    warfarin (COUMADIN) tablet 7.5 mg (mg) 7.5 mg Given 08/12/22 1740    warfarin (COUMADIN) tablet 7.5 mg (mg) 7.5 mg Given 08/11/22 1707                   Date   INR    Dose  08/02/22   1.2       5 mg  08/03/22   1.1       5 mg  08/04/22   1.1    7.5 mg  08/05/22   1.1    7.5 mg  08/06/22   1.4    7.5 mg  08/07/22   1.6    7.5 mg  08/08/22   1.9    7.5 mg  08/09/22   2.0    7.5 mg  08/10/22   2.1    7.5 mg  08/11/22   2.2    7.5 mg  08/12/22   2.2    7.5 mg  08/13/22   2.7    7.5 mg  08/14/22   2.9    5 mg (scheduled)    Assessment/Plan  (Goal INR: 2 - 3)  INR of 2.9 is high side of therapeutic. Will give 5 mg x 1 dose today. Active problem list reviewed. INR orders are placed. Chart reviewed for pertinent labs, drug/diet interactions, and past doses. Documentation of patient's clinical condition was reviewed. Pharmacy Dosing:  Pharmacy will continue to follow.      Thank you for consult,     Genny Bhandari, PharmD  PGY1 Pharmacy Resident  8/14/2022 1:01 PM

## 2022-08-14 NOTE — PROGRESS NOTES
Hospitalist Progress Note      PCP: Altagracia Strickland MD    Date of Admission: 8/3/2022    Chief Complaint:      Subjective: :Constitutional: No fever, chills, weakness, otherwise negative  Eyes: No eye pain or redness, otherwise negative  Ears, nose, mouth, throat, and face: No ear ache, no nose bleed no sore throat otherwise negative  Respiratory: No cough, sob, hemoptysis, otherwise  negative  Cardiovascular: No chest pain, palpitation, otherwise negative  Gastrointestinal: No nausea, vomiting diarrhea otherwise negative  Genitourinary:No hematuria, no dysuria, no frequency otherwise negative  Integument/breast: No pain, discomfort, redness otherwise negative  Hematologic/lymphatic: No bleed, lymph node swelling, petechia otherwise negative  Musculoskeletal:No back, muscle or joint pain swelling, otherwise negative  Neurological: No headache, seizure, loss of consciousness otherwise negative  Behavioral/Psych: No depression, suicidal or homicidal ideation otherwise negative  Endocrine: No thyroid pain, warmth or tenderness.  No wt loss otherwise negative  Allergic/Immunologic: No sneezing, itching or rash otherwise negative        Medications:  Reviewed    Infusion Medications    sodium chloride       Scheduled Medications    meropenem  1,000 mg IntraVENous Q8H    meclizine  12.5 mg Oral QAM AC    coenzyme Q10  100 mg Oral Dinner    clotrimazole   Topical BID    carvedilol  6.25 mg Oral BID WC    citalopram  20 mg Oral Daily    ferrous sulfate  325 mg Oral Every Other Day    furosemide  20 mg Oral Daily    lactobacillus acidophilus  2 tablet Oral TID    levothyroxine  88 mcg Oral Daily    miconazole   Topical BID    pantoprazole  40 mg Oral QAM AC    sodium chloride flush  5-40 mL IntraVENous 2 times per day    warfarin placeholder: dosing by pharmacy   Other RX Placeholder    Vitamin D  2,000 Units Oral Dinner    cyanocobalamin  1,000 mcg IntraMUSCular Weekly    lidocaine  3 patch TransDERmal Daily     PRN Meds: hydrocortisone, meclizine, sodium chloride, HYDROcodone 5 mg - acetaminophen, loperamide, ondansetron **OR** ondansetron, polyethylene glycol, sodium chloride flush, acetaminophen, bisacodyl, fleet      Intake/Output Summary (Last 24 hours) at 8/14/2022 1009  Last data filed at 8/13/2022 1854  Gross per 24 hour   Intake --   Output 30 ml   Net -30 ml       Exam:    BP (!) 133/91   Pulse 65   Temp 97.7 °F (36.5 °C)   Resp 16   Ht 5' (1.524 m)   Wt 159 lb 3.2 oz (72.2 kg)   SpO2 94%   BMI 31.09 kg/m²     General appearance: No apparent distress, appears stated age and cooperative. HEENT: Pupils equal, round, and reactive to light. Conjunctivae/corneas clear. Neck: Supple, with full range of motion. No jugular venous distention. Trachea midline. Respiratory:  Normal respiratory effort. Clear to auscultation, bilaterally without Rales/Wheezes/Rhonchi. Cardiovascular: Regular rate and rhythm with normal S1/S2 without murmurs, rubs or gallops. Abdomen: Soft, non-tender, non-distended with normal bowel sounds. Musculoskeletal: No clubbing, cyanosis or edema bilaterally. Full range of motion without deformity. Skin: Skin color, texture, turgor normal.  No rashes or lesions. Neuro: Non Focal. Symetrical motor and tone. Nl Comprehension, Alert,awake and oriented. NL CN. Symetrical tone and reflexes. Psychiatric: Alert and oriented, thought content appropriate, normal insight  Capillary Refill: Brisk,< 3 seconds   Peripheral Pulses: +2 palpable, equal bilaterally       Labs:   No results for input(s): WBC, HGB, HCT, PLT in the last 72 hours. No results for input(s): NA, K, CL, CO2, BUN, CREATININE, CALCIUM, PHOS in the last 72 hours. Invalid input(s): MAGNES  No results for input(s): AST, ALT, BILIDIR, BILITOT, ALKPHOS in the last 72 hours. Recent Labs     08/12/22  0436 08/13/22  0530 08/14/22  0509   INR 2.2 2.7 2.9     No results for input(s): [place] in the last 72 hours.     Urinalysis: Lab Results   Component Value Date/Time    NITRU Negative 08/05/2022 07:45 AM    WBCUA 0-2 08/05/2022 07:45 AM    BACTERIA Negative 08/05/2022 07:45 AM    RBCUA 6-10 08/05/2022 07:45 AM    BLOODU SMALL 08/05/2022 07:45 AM    SPECGRAV 1.014 08/05/2022 07:45 AM    GLUCOSEU Negative 08/05/2022 07:45 AM       Radiology:  CT ABDOMEN PELVIS W IV CONTRAST Additional Contrast? Radiologist Recommendation   Final Result   1. THERE IS A THICK WALLED PERIPHERALLY ENHANCING complex SOFT TISSUE COLLECTION WITH AIR AS WELL AS A DRAINAGE CATHETER SUBJACENT TO THE PROXIMAL PORTION OF THE ASCENDING COLON, CECUM. LIKELY THAT OF THE ABSCESS. IT HAS SHOWN SOME INTERVAL DECREASE IN    SIZE as COMPARED TO THE PRIOR EXAMINATION. 2. LARGE CYSTIC ADNEXAL MASS. UNCHANGED. THE GALLBLADDER NEOPLASM IS NOT EXCLUDED. CONTINUED FURTHER EVALUATION    3. SMALL LEFT PLEURAL EFFUSION. OTHER FINDINGS DETAILED ABOVE         All CT scans at this facility use dose modulation, iterative reconstruction, and/or weight based dosing when appropriate to reduce radiation dose to as low as reasonably achievable.               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 [Z74.09, Z78.9]      Priority: High    Abscess of abdominal cavity ([address] [K65.1] 08/09/2022     Priority: Medium    Intra-abdominal abscess ([address] [K65.1] 08/08/2022     Priority: Medium    Abdominal pain [R10.9] 07/27/2022     Priority: Medium    Lumbar stenosis with neurogenic claudication [M48.062] 06/02/2016     Priority: Medium    Chronic diastolic CHF (congestive heart failure) ([address] [I50.32] 04/01/2022    Acute on chronic combined systolic and diastolic CHF (congestive heart failure) ([address] [I50.43] 01/25/2021    Descending aortic aneurysm ([facility] [I71.9] 06/23/2017            Additional work up or/and treatment plan may be added today or then after based on clinical progression. I am managing a portion of pt care. Some medical issues are handled by other specialists. Additional work up and treatment should be done in out pt setting by pt PCP and other out pt providers. In addition to examining and evaluating pt, I spent additional time explaining care, normal and abnormal findings, and treatment plan. All of pt questions were answered. Counseling, diet and education were  provided. Case will be discussed with nursing staff when appropriate. Family will be updated if and when appropriate. Diet: ADULT DIET; Regular;  No Added Salt (3-4 gm)    Code Status: DNR-CC    PT/OT Eval           Electronically signed by ADRIENNE Degroot CNP on 8/14/2022 at 10:09 AM

## 2022-08-14 NOTE — PLAN OF CARE
Problem: Discharge Planning  Goal: Discharge to home or other facility with appropriate resources  Outcome: Progressing  Flowsheets (Taken 8/13/2022 2200 by Davis Benoit RN)  Discharge to home or other facility with appropriate resources: Refer to discharge planning if patient needs post-hospital services based on physician order or complex needs related to functional status, cognitive ability or social support system     Problem: ABCDS Injury Assessment  Goal: Absence of physical injury  Outcome: Progressing     Problem: Skin/Tissue Integrity  Goal: Absence of new skin breakdown  Description: 1. Monitor for areas of redness and/or skin breakdown  2. Assess vascular access sites hourly  3. Every 4-6 hours minimum:  Change oxygen saturation probe site  4. Every 4-6 hours:  If on nasal continuous positive airway pressure, respiratory therapy assess nares and determine need for appliance change or resting period.   Outcome: Progressing     Problem: Chronic Conditions and Co-morbidities  Goal: Patient's chronic conditions and co-morbidity symptoms are monitored and maintained or improved  Outcome: Progressing  Flowsheets (Taken 8/13/2022 2200 by Davis Benoit RN)  Care Plan - Patient's Chronic Conditions and Co-Morbidity Symptoms are Monitored and Maintained or Improved: Monitor and assess patient's chronic conditions and comorbid symptoms for stability, deterioration, or improvement

## 2022-08-15 LAB
ANION GAP SERPL CALCULATED.3IONS-SCNC: 9 MEQ/L (ref 9–15)
BUN BLDV-MCNC: 15 MG/DL (ref 8–23)
CALCIUM SERPL-MCNC: 8.5 MG/DL (ref 8.5–9.9)
CHLORIDE BLD-SCNC: 102 MEQ/L (ref 95–107)
CO2: 28 MEQ/L (ref 20–31)
CREAT SERPL-MCNC: 0.63 MG/DL (ref 0.5–0.9)
GFR AFRICAN AMERICAN: >60
GFR NON-AFRICAN AMERICAN: >60
GLUCOSE BLD-MCNC: 115 MG/DL (ref 70–99)
HCT VFR BLD CALC: 35.5 % (ref 37–47)
HEMOGLOBIN: 11.6 G/DL (ref 12–16)
INR BLD: 3.2
MCH RBC QN AUTO: 27.3 PG (ref 27–31.3)
MCHC RBC AUTO-ENTMCNC: 32.7 % (ref 33–37)
MCV RBC AUTO: 83.5 FL (ref 82–100)
PDW BLD-RTO: 16.1 % (ref 11.5–14.5)
PLATELET # BLD: 237 K/UL (ref 130–400)
POTASSIUM SERPL-SCNC: 4.2 MEQ/L (ref 3.4–4.9)
PROTHROMBIN TIME: 32 SEC (ref 12.3–14.9)
RBC # BLD: 4.25 M/UL (ref 4.2–5.4)
SODIUM BLD-SCNC: 139 MEQ/L (ref 135–144)
WBC # BLD: 7 K/UL (ref 4.8–10.8)

## 2022-08-15 PROCEDURE — 1180000000 HC REHAB R&B

## 2022-08-15 PROCEDURE — 6370000000 HC RX 637 (ALT 250 FOR IP): Performed by: INTERNAL MEDICINE

## 2022-08-15 PROCEDURE — 97116 GAIT TRAINING THERAPY: CPT

## 2022-08-15 PROCEDURE — 97530 THERAPEUTIC ACTIVITIES: CPT

## 2022-08-15 PROCEDURE — 2580000003 HC RX 258: Performed by: INTERNAL MEDICINE

## 2022-08-15 PROCEDURE — 2700000000 HC OXYGEN THERAPY PER DAY

## 2022-08-15 PROCEDURE — 80048 BASIC METABOLIC PNL TOTAL CA: CPT

## 2022-08-15 PROCEDURE — 97535 SELF CARE MNGMENT TRAINING: CPT

## 2022-08-15 PROCEDURE — 85027 COMPLETE CBC AUTOMATED: CPT

## 2022-08-15 PROCEDURE — 85610 PROTHROMBIN TIME: CPT

## 2022-08-15 PROCEDURE — 6360000002 HC RX W HCPCS: Performed by: INTERNAL MEDICINE

## 2022-08-15 PROCEDURE — 36415 COLL VENOUS BLD VENIPUNCTURE: CPT

## 2022-08-15 PROCEDURE — 97110 THERAPEUTIC EXERCISES: CPT

## 2022-08-15 PROCEDURE — 99233 SBSQ HOSP IP/OBS HIGH 50: CPT | Performed by: PHYSICAL MEDICINE & REHABILITATION

## 2022-08-15 PROCEDURE — 99232 SBSQ HOSP IP/OBS MODERATE 35: CPT | Performed by: INTERNAL MEDICINE

## 2022-08-15 PROCEDURE — 6370000000 HC RX 637 (ALT 250 FOR IP): Performed by: PHYSICAL MEDICINE & REHABILITATION

## 2022-08-15 RX ORDER — SODIUM CHLORIDE 9 MG/ML
250 INJECTION, SOLUTION INTRAVENOUS ONCE
Status: DISCONTINUED | OUTPATIENT
Start: 2022-08-15 | End: 2022-08-18 | Stop reason: HOSPADM

## 2022-08-15 RX ORDER — SODIUM CHLORIDE 0.9 % (FLUSH) 0.9 %
5-40 SYRINGE (ML) INJECTION PRN
Status: DISCONTINUED | OUTPATIENT
Start: 2022-08-15 | End: 2022-08-18 | Stop reason: HOSPADM

## 2022-08-15 RX ORDER — SODIUM CHLORIDE 0.9 % (FLUSH) 0.9 %
5-40 SYRINGE (ML) INJECTION PRN
Status: DISCONTINUED | OUTPATIENT
Start: 2022-08-15 | End: 2022-08-17

## 2022-08-15 RX ORDER — SODIUM CHLORIDE 9 MG/ML
INJECTION, SOLUTION INTRAVENOUS PRN
Status: DISCONTINUED | OUTPATIENT
Start: 2022-08-15 | End: 2022-08-18 | Stop reason: HOSPADM

## 2022-08-15 RX ORDER — LIDOCAINE HYDROCHLORIDE 20 MG/ML
5 INJECTION, SOLUTION INFILTRATION; PERINEURAL ONCE
Status: COMPLETED | OUTPATIENT
Start: 2022-08-15 | End: 2022-08-18

## 2022-08-15 RX ORDER — SODIUM CHLORIDE 9 MG/ML
INJECTION, SOLUTION INTRAVENOUS PRN
Status: DISCONTINUED | OUTPATIENT
Start: 2022-08-15 | End: 2022-08-17

## 2022-08-15 RX ORDER — SODIUM CHLORIDE 0.9 % (FLUSH) 0.9 %
5-40 SYRINGE (ML) INJECTION EVERY 12 HOURS SCHEDULED
Status: DISCONTINUED | OUTPATIENT
Start: 2022-08-15 | End: 2022-08-18 | Stop reason: HOSPADM

## 2022-08-15 RX ORDER — SODIUM CHLORIDE 0.9 % (FLUSH) 0.9 %
5-40 SYRINGE (ML) INJECTION EVERY 12 HOURS SCHEDULED
Status: DISCONTINUED | OUTPATIENT
Start: 2022-08-15 | End: 2022-08-16

## 2022-08-15 RX ORDER — WARFARIN SODIUM 5 MG/1
5 TABLET ORAL
Status: DISCONTINUED | OUTPATIENT
Start: 2022-08-15 | End: 2022-08-15

## 2022-08-15 RX ORDER — SODIUM CHLORIDE 9 MG/ML
250 INJECTION, SOLUTION INTRAVENOUS ONCE
Status: COMPLETED | OUTPATIENT
Start: 2022-08-15 | End: 2022-08-18

## 2022-08-15 RX ORDER — LIDOCAINE HYDROCHLORIDE 20 MG/ML
5 INJECTION, SOLUTION INFILTRATION; PERINEURAL ONCE
Status: DISCONTINUED | OUTPATIENT
Start: 2022-08-15 | End: 2022-08-18 | Stop reason: HOSPADM

## 2022-08-15 RX ADMIN — Medication 10 ML: at 20:46

## 2022-08-15 RX ADMIN — CLOTRIMAZOLE: 1 CREAM TOPICAL at 20:19

## 2022-08-15 RX ADMIN — MECLIZINE 12.5 MG: 12.5 TABLET ORAL at 06:32

## 2022-08-15 RX ADMIN — LACTOBACILLUS TAB 2 TABLET: TAB at 20:11

## 2022-08-15 RX ADMIN — MEROPENEM 1000 MG: 1 INJECTION, POWDER, FOR SOLUTION INTRAVENOUS at 06:37

## 2022-08-15 RX ADMIN — FUROSEMIDE 20 MG: 20 TABLET ORAL at 09:55

## 2022-08-15 RX ADMIN — Medication 100 MG: at 18:08

## 2022-08-15 RX ADMIN — LACTOBACILLUS TAB 2 TABLET: TAB at 14:17

## 2022-08-15 RX ADMIN — HYDROCORTISONE: 25 CREAM TOPICAL at 20:18

## 2022-08-15 RX ADMIN — LEVOTHYROXINE SODIUM 88 MCG: 88 TABLET ORAL at 09:55

## 2022-08-15 RX ADMIN — MICONAZOLE NITRATE: 2 POWDER TOPICAL at 09:56

## 2022-08-15 RX ADMIN — MEROPENEM 1000 MG: 1 INJECTION, POWDER, FOR SOLUTION INTRAVENOUS at 14:17

## 2022-08-15 RX ADMIN — MECLIZINE 12.5 MG: 12.5 TABLET ORAL at 14:17

## 2022-08-15 RX ADMIN — Medication 2000 UNITS: at 18:08

## 2022-08-15 RX ADMIN — CARVEDILOL 6.25 MG: 6.25 TABLET, FILM COATED ORAL at 18:08

## 2022-08-15 RX ADMIN — LACTOBACILLUS TAB 2 TABLET: TAB at 09:55

## 2022-08-15 RX ADMIN — Medication 10 ML: at 20:19

## 2022-08-15 RX ADMIN — CARVEDILOL 6.25 MG: 6.25 TABLET, FILM COATED ORAL at 09:55

## 2022-08-15 RX ADMIN — MICONAZOLE NITRATE: 2 POWDER TOPICAL at 20:18

## 2022-08-15 RX ADMIN — MEROPENEM 1000 MG: 1 INJECTION, POWDER, FOR SOLUTION INTRAVENOUS at 20:16

## 2022-08-15 RX ADMIN — Medication 10 ML: at 14:15

## 2022-08-15 RX ADMIN — PANTOPRAZOLE SODIUM 40 MG: 40 TABLET, DELAYED RELEASE ORAL at 06:32

## 2022-08-15 RX ADMIN — CITALOPRAM HYDROBROMIDE 20 MG: 10 TABLET ORAL at 09:55

## 2022-08-15 ASSESSMENT — ENCOUNTER SYMPTOMS
COUGH: 0
WHEEZING: 0
SHORTNESS OF BREATH: 0
NAUSEA: 0
RESPIRATORY NEGATIVE: 1
GASTROINTESTINAL NEGATIVE: 1
STRIDOR: 0
CHEST TIGHTNESS: 0
EYES NEGATIVE: 1
BACK PAIN: 1
BLOOD IN STOOL: 0

## 2022-08-15 NOTE — PROGRESS NOTES
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 Devendra Huff    RESTRICTIONS/PRECAUTIONS:  Restrictions/Precautions: Fall Risk     Hearing: Exceptions to [facility]  Hearing Exceptions: Hard of hearing/hearing concerns, No hearing aid (significantly [place])    Subjective/Observation:   Patient's family declined participating in individual music therapy session at 12:50pm. Patient lying in bed having dizziness. Patient's family states she doesn't like noise, won't even let them put the tv on. They state she would not be interested in music. Assessment/Plan:      [] Patient would like to have music therapy, just not today. [facility] if [facility] allows. [] Patient would like to have music therapy another time, however their D/C is before [facility] will be back on unit. *If patient is still on rehab unit, or comes back to rehab unit, [facility] if time allows. [x] Patient does not want music therapy. [facility] not [facility] pt again unless pt specifically requests.        HECTOR Gilliland    8/15/2022  Electronically signed by Mian Barrett on 8/15/2022 at 1:03 PM

## 2022-08-15 NOTE — CARE COORDINATION
IV BENEFIT REQUEST FORM    FAX FROM: [facility]                        [address] [facility]    REQUESTED BY: Electronically signed by KIN Muñoz, JANETW on 8/15/2022 at 1:36 PM                                               RN/C3: PHONE: [phone])     DATE:/TIME OF REQUEST: 08/15/22  TIME: 1:36 PM      TO: [facility] [facility]      FAX TO: [phone]    PHONE: [phone]     THIS PATIENT HAS BEEN IDENTIFIED TO POSSIBLY NEED LONG TERM IV'S. PLEASE CHECK INSURANCE COVERAGE FOR THE FOLLOWING PT/DRUGS. PATIENT'S NAME: Curtis MENDOZA                              ROOM: [place] [address]   PATIENT'S : 1932  PATIENT ADDRESS: [address] [place] [address] [place] [address]  SSN:    ()     PAYOR NAME:  Payor: Venda Gut / Plan: MEDICARE PART A AND B / Product Type: *No Product type* /     DRUG: (meropenem (MERREM))                         DOSE: (1,000 mg in sodium chloride 0.9 % 100 mL IVPB)            FREQUENCY: Q (EVERY 8 HOURS @ 200 mL/hr over 30 Minutes) HR        __________ CHECK HERE IF PT HAS NO INSURANCE AND REQUESTING SELF PAY COST. *IF [facility] IS NOT A PROVIDER FOR THIS PATIENT, PLEASE FORWARD INFO VIA FAX TO [facility] @ [phone],(PHONE NUMBER: [phone]) TO RUN BENEFIT VERIFICATION AND NOTIFY THE ABOVE C3 OF THIS PLAN. (FAX FACE SHEET WITH DEMOGRAPHICS AND INSURANCE INFO WITH THIS FORM.)  PLEASE FAX BENEFIT INFO TO: THE [phone]    This message is intended only for the use of the individual or entity to which it is addressed and may contain information that is privileged, confidential, and exempt from disclosure under applicable law.  If the reader of the notice is not intended recipient of the employee/agent responsible for delivering the message to the intended recipient, you are hereby notified than any dissemination, distribution or copying of this communication is strictly prohibited. Please contact the sender for further instructions on handling the information.           Electronically signed by KIN Barrios, JACKLYN on 8/15/2022 at 1:37 PM

## 2022-08-15 NOTE — PROGRESS NOTES
Physical Therapy Rehab Treatment Note  Facility/Department: [facility]  [facility]       NAME: Will Dobson  : 1932 (80 y.o.)  MRN: [id number]  CODE STATUS: DNR-CC    Date of Service: 8/15/2022       Restrictions:  Restrictions/Precautions: Fall Risk       SUBJECTIVE:   Subjective: \"Dizzy today\"  Pt's PRUDENCE reports pt had incrased dizziness throughtout her session and occasionally becoming emotional.    Pain  Pain: Pt denies pain. OBJECTIVE:        Bed mobility  Rolling to Left: Modified independent  Rolling to Right: Modified independent  Supine to Sit: Modified independent  Sit to Supine: Modified independent    Transfers  Sit to Stand: Modified independent  Stand to sit: Modified independent  Bed to Chair: Modified independent    Ambulation  Comments: NT today d/t reports of increased dizziness with standing. PT Exercises  A/AROM Exercises: Seated: marching, LAQ, hip add with ball, hip abd RTB, hamstring curls RTB x20 ea; supine: bridges x10, hooklying march x10, SAQ x20  Static Standing Balance Exercises: Stood at [employer] with B UE support 60sec with supervision          ASSESSMENT/PROGRESS TOWARDS GOALS:   Assessment: Pt has met bed mobility and transfer goal.  Pt limited by reports of increased dizziness symptoms when standing today. Unable to ambualte. Pt has previously required S-SBA for ambulation. Goals:  Long Term Goals  Long term goal 1: Pt to complete bed mobility with indep  Long term goal 2: Pt to complete transfers with indep  Long term goal 3: Pt to ambulate 50ft with LRD and indep  Long term goal 4: Pt to manage 2 steps with B HR and Supervision  Long term goal 5: Pt to complete TUG within 20sec to demonstrate improved functional mobility/balance  Patient Goals   Patient goals : does not state, family wishes pt to regain her indep    PLAN OF CARE/Safety:   Plan Comment: Cont per POC.     Therapy Time:   Individual   Time In 1100   Time Out 1130   Minutes 30 Minutes:  Transfer/Bed mobility training:10  Gait trainin  Neuro re education:0  Therapeutic ex:20      Mia Alvarez PTA, 08/15/22 at 11:27 AM

## 2022-08-15 NOTE — PROGRESS NOTES
Physical Therapy Goal modification   Facility/Department: [facility] R250/R250-01    NAME: Spencer Rodriguez    : 1932 (80 y.o.)  MRN: [id number]    Account: [de-identified]  Gender: female    Goals modified due to safety concerns and pt not anticipated to be at an indep level of care    Long Term Goals  Long term goal 1: Pt to complete bed mobility with indep  Long term goal 2: Pt to complete transfers with supervision  Long term goal 3: Pt to ambulate 50ft with LRD and supervision  Long term goal 4: Pt to manage 2 steps with B HR and Supervision  Long term goal 5: Pt to complete TUG within 40sec to demonstrate improved functional mobility/balance       Anthony Henson, PT, 08/15/22 at 2:58 PM

## 2022-08-15 NOTE — PROGRESS NOTES
Physical Therapy Rehab Treatment Note  Facility/Department: [facility]  Room: R250/R250-01       NAME: Artis Matos  : 1932 (80 y.o.)  MRN: [id number]  CODE STATUS: DNR-CC    Date of Service: 8/15/2022       Restrictions:  Restrictions/Precautions: Fall Risk       SUBJECTIVE:   Subjective: Pt states she is \"ok\"  \"Dizzy this morning. A little bit but ok now. \"    Pain  Pain: Pt denies pain      OBJECTIVE:         Bed mobility  Rolling to Left: Modified independent  Rolling to Right: Modified independent  Supine to Sit: Modified independent  Sit to Supine: Modified independent    Transfers  Sit to Stand: Modified independent  Stand to sit: Modified independent  Bed to Chair: Modified independent    Ambulation  WB Status: wbat  Ambulation  Surface: level tile;carpet  Device: Rolling Walker  Other Apparatus: O2  Assistance: Supervision  Gait Deviations: Slow [place];Decreased step length  Distance: 60ftx2  Comments: NT today d/t reports of increased dizziness with standing. Stairs/Curb  Stairs?: Yes  Stairs  # Steps : 4  Stairs Height: 6\"  Rails: Bilateral  Assistance: Stand by assistance        PT Exercises  A/AROM Exercises: supine: SLR x10, heel slides x20, hip abd x20, SAQ x20, bridges x10, LTR x10  Static Standing Balance Exercises: Stood at [employer] with B UE support 60sec with supervision        ASSESSMENT/PROGRESS TOWARDS GOALS:   Assessment: Improved tolerance to activity this PM.  Pt able to ambulate and complete stairs. Requires supervision for safety an management of O2 tube.     Goals:  Long Term Goals  Long term goal 1: Pt to complete bed mobility with indep  Long term goal 2: Pt to complete transfers with indep  Long term goal 3: Pt to ambulate 50ft with LRD and indep  Long term goal 4: Pt to manage 2 steps with B HR and Supervision  Long term goal 5: Pt to complete TUG within 20sec to demonstrate improved functional mobility/balance  Patient Goals   Patient goals : does not state, family wishes pt to regain her indep    PLAN OF CARE/Safety:   Plan Comment: Cont per POC.       Therapy Time:   Individual   Time In 1400   Time Out 1500   Minutes 60     Minutes:  Transfer/Bed mobility training:10  Gait trainin  Neuro re education:0  Therapeutic ex:30      Mia Alvarez PTA, 08/15/22 at 4:51 PM

## 2022-08-15 NOTE — PROGRESS NOTES
Progress Note  Patient: Monica Adame  Unit/Bed: R250/R250-01  YOB: 1932  MRN: [id number]  Acct: [de-identified]   Admitting Diagnosis: Impaired mobility and activities of daily living [Z74.09, Z78.9]  Impaired mobility and ADLs [Z74.09, Z78.9]  Admit Date:  8/3/2022  Hospital Day: 15    Chief Complaint:  CAD    Histories:  Past Medical History:   Diagnosis Date    Ascending aortic aneurysm ([address] 6/23/2017    Charcot Arleen Tooth muscular atrophy     CHF [facility]congestive heart failure) ([facility])     Chronic diastolic CHF (congestive heart failure) ([facility] 4/1/2022    Depression     Descending aortic aneurysm ([facility] 6/23/2017    Essential hypertension 2/16/2018    Headache     HTN (hypertension)     Impaired mobility and activities of daily living     Lumbar stenosis with neurogenic claudication     Myelopathy ([facility]     Osteoarthritis      Past Surgical History:   Procedure Laterality Date    JOINT REPLACEMENT Bilateral     knees    OTHER SURGICAL HISTORY Right 07/21/2022    cat scan guided abdominal mass aspiration with drain placement by Dr. Van Ruby Left 02/25/2021    LEFT PLEURAL CATHETER INSERTION performed by Donis Grant MD at [place] [address] Left 01/29/2021    total of 755 cc removed per Dr Daniele Jacobsen specimen sent to lab     Family History   Problem Relation Age of Onset    Arthritis Mother     Arthritis Father     High Blood Pressure Father      Social History     Socioeconomic History    Marital status:       Spouse name: None    Number of children: 2    Years of education: None    Highest education level: None   Tobacco Use    Smoking status: Never    Smokeless tobacco: Never   Substance and Sexual Activity    Alcohol use: No     Alcohol/week: 0.0 standard drinks    Drug use: No   Social History Narrative    , Lives With: Alone, son lives down the street, dtr is in the area    Type of Home: [facility]  in [address]: One level    Home Access: Stairs to enter with rails- Number of Steps: 2- Rails: Both    Bathroom Shower/Tub: Tub/Shower unit, Bathroom Equipment: Grab bars in shower, Shower chair    Home Equipment: Rolling walker, Cane(Pt infrequemtly uses DME for ambulation and prefers to furniture walk in home)    ADL Assistance: Independent, [address]: Independent    Homemaking Responsibilities: Yes    Ambulation Assistance: Independent, Transfer Assistance: Independent    Additional Comments: Son stops over 2 times daily           Subjective/HPI  No new events over the weekend. No cp no sob. . wants drain tube out. Still draining serous fluid    EKG:        Review of Systems:   Review of Systems   Constitutional: Negative. Negative for diaphoresis and fatigue. HENT: Negative. Eyes: Negative. Respiratory: Negative. Negative for cough, chest tightness, shortness of breath, wheezing and stridor. Cardiovascular: Negative. Negative for chest pain, palpitations and leg swelling. Gastrointestinal: Negative. Negative for blood in stool and nausea. Genitourinary: Negative. Musculoskeletal:  Positive for arthralgias, back pain and gait problem. Skin: Negative. Neurological:  Positive for weakness. Negative for dizziness, syncope and light-headedness. Hematological: Negative. Psychiatric/Behavioral: Negative. Physical Examination:    /75   Pulse 63   Temp 98.4 °F (36.9 °C)   Resp 16   Ht 5' (1.524 m)   Wt 159 lb 3.2 oz (72.2 kg)   SpO2 98%   BMI 31.09 kg/m²    Physical Exam   Constitutional: No distress. She appears chronically ill. HENT:   Normal cephalic and Atraumatic   Eyes: Pupils are equal, round, and reactive to light. Neck: Thyroid normal. No JVD present. No neck adenopathy. No thyromegaly present. Cardiovascular: Normal rate, regular rhythm, intact distal pulses and normal pulses. Murmur heard. Pulmonary/Chest: Effort normal and breath sounds normal. She has no wheezes. She has no rales. She exhibits no tenderness. Abdominal: Soft. Bowel sounds are normal. There is no abdominal tenderness. Musculoskeletal:         General: No tenderness or edema. Normal range of motion. Cervical back: Normal range of motion and neck supple. Neurological: She is alert and oriented to person, place, and time. Skin: Skin is warm. No cyanosis. Nails show no clubbing.      LABS:  CBC:   Lab Results   Component Value Date/Time    WBC 6.2 08/04/2022 03:28 AM    RBC 3.62 08/04/2022 03:28 AM    HGB 10.1 08/04/2022 03:28 AM    HCT 30.4 08/04/2022 03:28 AM    MCV 83.9 08/04/2022 03:28 AM    MCH 27.9 08/04/2022 03:28 AM    MCHC 33.3 08/04/2022 03:28 AM    RDW 15.8 08/04/2022 03:28 AM     08/04/2022 03:28 AM     CBC with Differential:    Lab Results   Component Value Date/Time    WBC 6.2 08/04/2022 03:28 AM    RBC 3.62 08/04/2022 03:28 AM    HGB 10.1 08/04/2022 03:28 AM    HCT 30.4 08/04/2022 03:28 AM     08/04/2022 03:28 AM    MCV 83.9 08/04/2022 03:28 AM    MCH 27.9 08/04/2022 03:28 AM    MCHC 33.3 08/04/2022 03:28 AM    RDW 15.8 08/04/2022 03:28 AM    BANDSPCT 2 07/24/2022 05:00 AM    METASPCT 2 07/24/2022 05:00 AM    LYMPHOPCT 7.0 07/24/2022 05:00 AM    MONOPCT 5.8 07/24/2022 05:00 AM    BASOPCT 1.0 07/24/2022 05:00 AM    MONOSABS 0.7 07/24/2022 05:00 AM    LYMPHSABS 0.8 07/24/2022 05:00 AM    EOSABS 0.0 07/24/2022 05:00 AM    BASOSABS 0.1 07/24/2022 05:00 AM     CMP:    Lab Results   Component Value Date/Time     08/04/2022 03:28 AM    K 3.9 08/04/2022 03:28 AM    K 3.2 07/24/2022 05:00 AM     08/04/2022 03:28 AM    CO2 27 08/04/2022 03:28 AM    BUN 14 08/04/2022 03:28 AM    CREATININE 0.56 08/04/2022 03:28 AM    GFRAA >60.0 08/04/2022 03:28 AM    LABGLOM >60.0 08/04/2022 03:28 AM    GLUCOSE 110 08/04/2022 03:28 AM    PROT 6.0 07/24/2022 05:00 AM    LABALBU 3.0 08/04/2022 03:28 AM    CALCIUM 8.8 08/04/2022 03:28 AM    BILITOT 0.3 07/24/2022 05:00 AM    ALKPHOS 51 despite being on Xarelto. Xarelto stopped and started Heparin. Had long discussion with pt and daughter. They no longer want aggressive Rx and does not want to be transferred to [facility].          Electronically signed by Gisele Villa MD on 8/15/2022 at 9:58 AM

## 2022-08-15 NOTE — PROGRESS NOTES
Assessment completed. A&O x4. Denies pain at this time. Emptied RUQ drain for 20 ml of serosanguinous drainage. In bed with alarm activated. Call light in reach. Electronically signed by Miki Gale LPN on 8/99/0534 at 4:13 PM      Pt c/o dizziness. Gave PRN Antivert PO. Electronically signed by Miki Gale LPN on 4/52/9323 at 5:95 PM      Shelli served Dr Alonzo Dooley about placing PICC for antibiotics. Also shelli served Dr Fer Sabillon regarding if Coumadin can be held for placement of PICC. Dr Fer Sabillon responded Coumadin can be on hold for 3 days starting tonight. Will need to bridge with Lovenox once INR drops. Dr Fer Sabillon said he would take care of Lovenox. INR is 3.2 today. Notified Dr Alonzo Dooley of Dr Fer aSbillon saying it was ok to hold Coumadin for PICC. Dr Alonzo Dooley said ok for PICC placement. Notified Eric Hoff RN, Rebekah REAL Case Manager and Dr Mallory Almonte. Electronically signed by Miki Gale LPN on 4/96/0579 at 0:07 PM      Specials called this nurse regarding PICC placement. PICC placement scheduled for tomorrow 8/16/22. Radiologist will not place PICC until INR is at 2. Pt has daily INR lab draws. Notified Rebekah .  Electronically signed by Miki Gale LPN on 2/57/4326 at 7:27 PM

## 2022-08-15 NOTE — CARE COORDINATION
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  [facility] rehab admit 8/3/2022    CO MORBIDITIES:      Past Medical History:   Diagnosis Date    Ascending aortic aneurysm ([address] 2017    Charcot Arleen Tooth muscular atrophy     CHF (congestive heart failure) (HCC)     Chronic diastolic CHF (congestive heart failure) ([address] 2022    Depression     Descending aortic aneurysm ([address] 2017    Essential hypertension 2018    Headache     HTN (hypertension)     Impaired mobility and activities of daily living     Lumbar stenosis with neurogenic claudication     Myelopathy ([address]     Osteoarthritis      Past Surgical History:   Procedure Laterality Date    JOINT REPLACEMENT Bilateral     knees    OTHER SURGICAL HISTORY Right 2022    cat scan guided abdominal mass aspiration with drain placement by Dr. Mere Gutierrez Left 2021    LEFT PLEURAL CATHETER INSERTION performed by Leonor Starks MD at [place] [id number] Left 2021    total of 755 cc removed per Dr Maria Bottom specimen sent to lab        Restrictions  Restrictions/Precautions: Fall Risk  Position Activity Restriction  Other position/activity restrictions: Abdominal drain  CASE MANAGEMENT    Social/Functional History  Social/Functional History  Lives With: Alone  Type of Home: House  Home Layout: One level  Home Access: Stairs to enter with rails  Entrance Stairs - Number of Steps: 2  Entrance Stairs - Rails: Both  Bathroom Shower/Tub: Tub/Shower unit  Bathroom Equipment: Grab bars in shower  Home Equipment: milka Miranda  Has the patient had two or more falls in the past year or any fall with injury in the past year?: (Pt reports one fall in the last year)  Receives Help From: Family  ADL Assistance: Independent  Homemaking Assistance: Independent  Homemaking Responsibilities: Yes  Meal Prep Responsibility: Primary  Laundry Responsibility: Primary  Cleaning Responsibility: Primary  Shopping Responsibility: No (Son performs)  Ambulation Assistance: Independent ([facility] and furniture walking in the home; Pt only wears AFO's when ambulating outside with QC. Inside she is always barefoot)  Transfer Assistance: Independent  Active : No  Patient's  Info: family - son lives close by; dtr lives in [place]  Additional Comments: [facility] information collected through chart review and dtr able to confirm. Pt declines teleinterpreter. Per pt's dtr, pt cannot hear the teleinterpreter and from previous experiences, the [facility] interpreters are unable to speak her particular dialect. Dtr reports that she would rather have her family present to clarify instructions. This writer discussed translation concerns with nursing manager who will investigate options other than family. Pts personal preferences: n/a    Pts assets/resources/support system: son, dtr, dtr-in-law    COVERAGE INFORMATION:Payor: MEDICARE / Plan: MEDICARE PART A AND B / Product Type: *No Product type* /       NURSING  No active isolations    Weight: 159 lb 3.2 oz (72.2 kg) / Body mass index is 31.09 kg/m². ADULT DIET; Regular;  No Added Salt (3-4 gm)    SpO2: 95 % (08/15/22 1230)  O2 Flow Rate (L/min): 3 L/min (08/15/22 1230)    Oxygen to be continued upon discharge: Yes  Home-going needs (nocturnal Pox, sleep study, RX, equipment): Yes    Skin Issues: Yes; drain  Home-going needs (education, training, RX, Wound RN): Yes    Pain Managed: Yes    Bladder continence: Yes  Discharging with Taylor: No   Training done: No   Urology following: No   Plan/date to remove taylor: No   Bladder retraining started: No    Bowel continence:  Yes  Last BM date: 8/18/22  Need for bowel program: No    Anticoagulants: Coumadin  Home-going needs (Education, labs): Yes    Antibiotics: Merrem Q 8 hrs  Stop date: TBD  Home-going needs (education, RX, PICC): Yes      Other: pt having loose stool--nursing monitoring for cdiff  Needs to have a PICC placed      PHYSICAL THERAPY  Bed mobility:  Bed mobility  Bridging: Stand by assistance (08/04/22 1014)  Rolling to Left: Modified independent (08/15/22 1122)  Rolling to Right: Modified independent (08/15/22 1122)  Supine to Sit: Modified independent (08/15/22 1122)  Sit to Supine: Modified independent (08/15/22 1122)  Bed Mobility Comments: increased time and effort to complete all transitions. Hand over hand assist to complete each activity. (08/04/22 1014)  Bed Mobility  Additional Factors: Head of bed flat; With handrails;Verbal cues; Increased time to complete (08/14/22 1423)  Additional Factors: Head of bed flat; With handrails;Verbal cues; Increased time to complete (08/14/22 1423)  Roll Left  Assistance Level: Stand by assist (08/14/22 1423)  Roll Right  Assistance Level: Stand by assist (08/14/22 1423)  Sit to Supine  Assistance Level: Stand by assist (08/14/22 1423)  Supine to Sit  Assistance Level: Supervision (08/14/22 1423)  Scooting  Assistance Level: Minimal assistance (08/14/22 1423)  Skilled Clinical Factors: requires minimal assistance when supine to scoot to [facility] (08/14/22 1423)  Transfers:  Transfers  Sit to Stand: Modified independent (08/15/22 1122)  Stand to sit: Modified independent (08/15/22 1122)  Bed to Chair: Modified independent (08/15/22 1122)  Car Transfer: Stand by assistance (VCs/TCs after demo for safest technique.) (08/12/22 1131)  Comment: Patient requires min A to stand from chair without arm rest (08/11/22 1117)  Transfers  Surface: From bed; To bed (08/14/22 1423)  Additional Factors: Hand placement cues (08/14/22 1423)  Device: Lynnda Leventhal (08/14/22 1423)  Sit to Stand  Assistance Level: Supervision (08/14/22 1423)  Stand to Sit  Assistance Level: Supervision (08/14/22 1423)  Skilled Clinical Factors: cues for improving overall quality with ww placement (08/14/22 1423)  Bed To/From Chair  Assistance Level: Supervision (08/06/22 1516)  Stand Pivot  Assistance Level: Contact guard assist (08/04/22 1312)  Gait:   Ambulation  WB Status: wbat (08/15/22 1525)  Ambulation  Surface: level tile;carpet (08/15/22 1525)  Device: Rolling Walker (08/15/22 1525)  Other Apparatus: O2 (08/15/22 1525)  Assistance: Supervision (08/15/22 1525)  Quality of Gait: Assist for management of O2 line. Cues for awareness to line with pt attempting to manage with fair follow through. (08/10/22 1146)  Gait Deviations: Slow Raji;Decreased step length (08/15/22 1525)  Distance: 60ftx2 (08/15/22 1525)  Comments: NT today d/t reports of increased dizziness with standing. (08/15/22 1119)  Ambulation  Surface: Level surface (08/14/22 1424)  Device: Rolling walker (08/14/22 1424)  Distance: 100', 20' x 2 (08/14/22 1424)  Activity: Within Room (08/06/22 1517)  Activity Comments: Reciprocal pattern (08/06/22 1517)  Additional Factors: Set-up; Verbal cues (08/14/22 1424)  Assistance Level: Stand by assist (08/14/22 1424)  Gait Deviations: Slow raji;Decreased step length bilateral;Decreased heel strike right;Decreased heel strike left; Wide base of support (08/14/22 1424)  Skilled Clinical Factors: Patient with AFO's donned, mild ff posture, good manuevering around objects (08/14/22 1424)  Stairs:  Stairs/Curb  Stairs?: Yes (08/15/22 1525)  Stairs  # Steps : 4 (08/15/22 1525)  Stairs Height: 6\" (08/15/22 1525)  Rails: Bilateral (08/15/22 1525)  Assistance: Stand by assistance (08/15/22 1525)  Comment: Pt attempted to adjust mask while descending stairs becoming unsteady requiring CGA with 1 UE support. (08/12/22 1131)  Stairs  Stair Height: 6'' (08/06/22 1517)  Device: Bilateral handrails (08/06/22 1517)  Number of Stairs: 4 (08/06/22 1517)  Additional Factors: Set-up; Verbal cues; Non-reciprocal going up;Non-reciprocal going down (08/06/22 1517)  Assistance Level: Contact guard assist (08/06/22 1517)  Skilled Clinical Factors: Pt reuiring CGA with first two steps, but then Min A with third and Mod with fourth. Pt only CGA with descending forward. (08/04/22 1525)  W/C mobility:     LTG:  Long term goal 1: Pt to complete bed mobility with indep  Long term goal 2: Pt to complete transfers with supervision  Long term goal 3: Pt to ambulate 50ft with LRD and supervision  Long term goal 4: Pt to manage 2 steps with B HR and Supervision  Long term goal 5: Pt to complete TUG within 40sec to demonstrate improved functional mobility/balance  PT Treatment Time:  1.5 hrs      OCCUPATIONAL THERAPY    EVALUATION SELF CARE STATUS:  Hand Dominance: Right  Feeding: Setup (08/04/22 1114)  Grooming: Setup; Increased time to complete (08/04/22 1114)  UE Bathing: Stand by assistance; Increased time to complete (08/04/22 1114)  LE Bathing: Moderate assistance; Increased time to complete (08/04/22 1114)  UE Dressing: Setup (08/12/22 1047)  LE Dressing: Moderate assistance;Dependent/Total (08/12/22 1047)  LE Dressing Skilled Clinical Factors: Dep to don/doff socks and shoes. Mod A to don pants over feet and bring to knees. (08/12/22 1047)  Toileting: Maximum assistance (08/04/22 1114)  Additional Comments: Pt participating in ADLs seated in w/c and standing by w/c using w/w for dynamic standing balance, CGA. (08/12/22 1047)             CURRENT SELF CARE:  Grooming/Oral Hygiene  Assistance Level: Independent; Increased time to complete (08/15/22 1128)  Skilled Clinical Factors: wash hands in standing (08/10/22 1107)  Upper Extremity Bathing  Assistance Level: Set-up (08/10/22 0941)  Skilled Clinical Factors: Patient completed sponge bath seated in armchair at bathrooom sink (08/10/22 0941)  Lower Extremity Bathing  Assistance Level: Set-up (08/10/22 0941)  Skilled Clinical Factors: B feet (08/08/22 1021)  Upper Extremity Dressing  Assistance Level: Set-up (08/10/22 0941)  Skilled Clinical Factors: pull down back (08/08/22 1021)  Lower Extremity Dressing  Assistance Level: Supervision; Increased time to complete (08/10/22 0941)  Skilled Clinical Factors: thread R LE x 1/2 (08/09/22 1021)  Putting On/Taking Off Footwear  Equipment Provided: Sock aid (08/10/22 0941)  Assistance Level: Dependent (08/15/22 1128)  Skilled Clinical Factors: B socks/AFO's/shoes (08/15/22 1128)  Toileting  Assistance Level: Supervision; Increased time to complete (08/15/22 1128)  Skilled Clinical Factors: Supervision for clothing management in standing 2° c/o dizziness (08/15/22 1128)  Toilet Transfers  Technique: Stand step (08/15/22 1128)  Equipment: Grab bars;Standard toilet (08/15/22 1128)  Additional Factors: Verbal cues (08/10/22 1107)  Assistance Level: Stand by assist;Increased time to complete (08/15/22 1128)  Skilled Clinical Factors: ww (08/15/22 1128)  Tub/Shower Transfers  Type: Shower (08/08/22 1021)  Transfer From: Kian Janus (08/08/22 1021)  Transfer To: Shower chair with back (08/08/22 1021)  Additional Factors:  With handrails (08/08/22 1021)  Assistance Level: Contact guard assist (08/08/22 1021)  Skilled Clinical Factors: CGA 2° frequent c/o dizziness (08/08/22 1021)        LTG:  Eating  Assistance Needed: Setup or clean-up assistance  CARE Score: 5  Discharge Goal: Independent, Oral Hygiene  Assistance Needed: Setup or clean-up assistance  CARE Score: 5  Discharge Goal: Independent, [address] needed: Substantial/maximal assistance  CARE Score: 2  Discharge Goal: Independent, Shower/Bathe Self  Assistance Needed: Partial/moderate assistance  CARE Score: 3  Discharge Goal: Independent  Upper Body Dressing  Assistance Needed: Supervision or touching assistance  CARE Score: 4  Discharge Goal: Independent, Lower Body Dressing  Assistance Needed: Partial/moderate assistance  CARE Score: 3  Discharge Goal: Independent, Putting On/Taking Off Footwear  Assistance Needed: Dependent  CARE Score: 1  Discharge Goal: Independent, Toilet Transfer  Assistance needed: Partial/moderate assistance  CARE Score: 3  Discharge Goal: Independent  OT Treatment Time: 1.5 hrs      SPEECH THERAPY                 Diet/Swallow:                       LTG:                COGNITION  OT: Cognition Comment: Comp Mod I, expression Mod I, social Ind, problem Sup, Memory Sup  SP:        RECREATIONAL THERAPY  Attendance to recreational therapy programs:    []  Pet Therapy  [] Music Therapy  [] Art Therapy    [] Recreation Therapy Group [] Support Group           Patient social interaction (mood, participation): good, can be impacted by dizziness    Patient strengths: independent prior    Patients goal: return home alone vs SNF    Problems/Barriers: cannot manage O2 tubing, cannot manage getting AFOs on/off, drain, dizziness, IV, coumadin        1. Safety:          - Intervention / Plan:    [x]  falls protocol     [x]  PT/OT    [x]  SP        - Results:         2. Potential DME needs:         - Intervention / Plan:  [x]  PT/OT     [x]  Assess equipment needs/access       - Results:         3. Weakness:          - Intervention / Plan:  [x]  PT/OT      []  Other:         - Results:         4. Discharge planning needs:          - Intervention / Plan:  [x]  Weekly team conference      [x]  family training        - Results:         5.            - Intervention / Plan:          - Results:         6.            - Intervention / Plan:         - Results:         7.            - Intervention / Plan:         - Results:           Discharge Plan   Estimated Length of Stay: 12 days    Tentative Discharge date: 8/17/22      Anticipated Discharge Destination:  SNF TBD      Team recommendations:    1. Follow up Therapy :    PT  OT  SLP  RN  Social Work  [facility] Aide    2. Therapy at SNF    Other:     Equipment needed at Discharge:  Other: SNF to provide      Team Members Present at Conference:    Physician: Dr. Maria Eugenia Arzate  : Kilo Zavala, RN  : KIN Fofana, LSW  RN: Rosa Zeng RN  Physical Therapist: Karly Odonnell PT  Occupational Therapist: Calvin Gil OTR  Speech Therapist: Melissa Solares, SOBIA      Electronically signed by KIN Fofana LSW on 8/15/2022 at 4:36 PM

## 2022-08-15 NOTE — PROGRESS NOTES
Subjective: The patient complains of severe acute on chronic progressive fatigue and  RLQ abdominal pain partially relieved by rest, medications, PT,  OT,     and rest and exacerbated by recent illness -she was initially admitted through the ER at [facility] complaining of abdominal pain for the past 5 to 7 days. She also complained of headache and dizziness. Imaging revealed an abdominal mass felt to be either cancer or an abscess. Imaging was thinking it was more like an abscess. She was admitted to [facility] placed on antibiotics and a drain was placed under interventional radiology's guidance. She was transferred off the rehab unit because of findings on a CT abdomen which showed aneurysm and clots however she was felt to be nonoperative given her age and complex medical status. Her family does not want to pursue aggressive treatment and she is very glad to be back on the rehabilitation unit and is medically stable and ready to participate. ROS x10: The patient also complains of severely impaired mobility and activities of daily living. Otherwise no new problems with vision, hearing, nose, mouth, throat, dermal, cardiovascular, GI, , pulmonary, musculoskeletal, psychiatric or neurological. See also Acute Rehab PM&R H&P. Vital signs:  /86   Pulse 77   Temp 97 °F (36.1 °C)   Resp 18   Ht 5' (1.524 m)   Wt 159 lb 3.2 oz (72.2 kg)   SpO2 98%   BMI 31.09 kg/m²   I/O:   PO/Intake:  fair PO intake,   Reg diet    Bowel:   continent   Bladder: continent      retention  General:  Patient is well developed,   adequately nourished, and    well kempt. HEENT:    Pupils equal, hearing intact to loud voice, external inspection of ear and nose benign. Inspection of lips, tongue and gums benign    Musculoskeletal: No significant change in strength or tone. All joints stable.       Inspection and palpation of digits and nails show no clubbing, cyanosis or inflammatory conditions. Neuro/Psychiatric: Affect: flat but pleasant. Alert and oriented to person, place and situation with  no needded cues. No significant change in deep tendon reflexes or sensation  Lungs:  Diminished, CTA-B. Respiration effort is   normal at rest.     Heart:   S1 = S2,    irreg. Abdomen:  Soft, RLQ-tender, no enlargement of liver or spleen. Extremities:    lower extremity edema    Skin:   Intact to general survey,    right lower drain site now on IV Merrem every 8 hours    Rehabilitation:  Physical Therapy:   Bed mobility:  Bed mobility  Bridging: Stand by assistance (08/04/22 1014)  Rolling to Left: Modified independent (08/12/22 1130)  Rolling to Right: Modified independent (08/12/22 1130)  Supine to Sit: Modified independent (08/12/22 1130)  Sit to Supine: Modified independent (08/12/22 1130)  Bed Mobility Comments: increased time and effort to complete all transitions. Hand over hand assist to complete each activity. (08/04/22 1014)  Bed Mobility  Additional Factors: Head of bed flat; With handrails;Verbal cues; Increased time to complete (08/14/22 1423)  Additional Factors: Head of bed flat; With handrails;Verbal cues; Increased time to complete (08/14/22 1423)  Roll Left  Assistance Level: Stand by assist (08/14/22 1423)  Roll Right  Assistance Level: Stand by assist (08/14/22 1423)  Sit to Supine  Assistance Level: Stand by assist (08/14/22 1423)  Supine to Sit  Assistance Level: Supervision (08/14/22 1423)  Scooting  Assistance Level: Minimal assistance (08/14/22 1423)  Skilled Clinical Factors: requires minimal assistance when supine to scoot to [facility] (08/14/22 1423)  Transfers:  Transfers  Sit to Stand: Supervision (08/12/22 1131)  Stand to sit: Supervision (08/12/22 1131)  Bed to Chair: Supervision (08/11/22 1117)  Car Transfer: Stand by assistance (VCs/TCs after demo for safest technique.) (08/12/22 1131)  Comment: Patient requires min A to stand from chair without arm rest (08/11/22 1117)  Transfers  Surface: From bed; To bed (08/14/22 1423)  Additional Factors: Hand placement cues (08/14/22 1423)  Device: Michel Dennis (08/14/22 1423)  Sit to Stand  Assistance Level: Supervision (08/14/22 1423)  Stand to Sit  Assistance Level: Supervision (08/14/22 1423)  Skilled Clinical Factors: cues for improving overall quality with ww placement (08/14/22 1423)  Bed To/From Chair  Assistance Level: Supervision (08/06/22 1516)  Stand Pivot  Assistance Level: Contact guard assist (08/04/22 1312)  Gait:   Ambulation  WB Status: wbat (08/12/22 1131)  Ambulation  Surface: carpet (08/12/22 1131)  Device: Rolling Walker (08/12/22 1131)  Other Apparatus: O2 (08/12/22 1131)  Assistance: Supervision (08/12/22 1131)  Quality of Gait: Assist for management of O2 line. Cues for awareness to line with pt attempting to manage with fair follow through. (08/10/22 1146)  Gait Deviations: Slow Raji;Decreased step length (08/12/22 1131)  Distance: 60ft (08/12/22 1131)  Comments: Cues and assist for O2 line management. (08/09/22 1107)  Ambulation  Surface: Level surface (08/14/22 1424)  Device: Rolling walker (08/14/22 1424)  Distance: 100', 20' x 2 (08/14/22 1424)  Activity: Within Room (08/06/22 1517)  Activity Comments: Reciprocal pattern (08/06/22 1517)  Additional Factors: Set-up; Verbal cues (08/14/22 1424)  Assistance Level: Stand by assist (08/14/22 1424)  Gait Deviations: Slow raji;Decreased step length bilateral;Decreased heel strike right;Decreased heel strike left; Wide base of support (08/14/22 1424)  Skilled Clinical Factors: Patient with AFO's donned, mild ff posture, good manuevering around objects (08/14/22 1424)  Stairs:  Stairs/Curb  Stairs?: Yes (08/12/22 1131)  Stairs  # Steps : 4 (08/12/22 1131)  Stairs Height: 6\" (08/12/22 1131)  Rails: Bilateral (08/12/22 1131)  Assistance: Stand by assistance (08/12/22 1131)  Comment: Pt attempted to adjust mask while descending stairs becoming unsteady requiring CGA with 1 UE support. (08/12/22 1131)  Stairs  Stair Height: 6'' (08/06/22 1517)  Device: Bilateral handrails (08/06/22 1517)  Number of Stairs: 4 (08/06/22 1517)  Additional Factors: Set-up; Verbal cues; Non-reciprocal going up;Non-reciprocal going down (08/06/22 1517)  Assistance Level: Contact guard assist (08/06/22 1517)  Skilled Clinical Factors: Pt reuiring CGA with first two steps, but then Min A with third and Mod with fourth. Pt only CGA with descending forward. (08/04/22 1525)  W/C mobility:       Occupational Therapy:   Hand Dominance: Right  ADL  Feeding: Setup (08/04/22 1114)  Grooming: Setup; Increased time to complete (08/04/22 1114)  UE Bathing: Stand by assistance; Increased time to complete (08/04/22 1114)  LE Bathing: Moderate assistance; Increased time to complete (08/04/22 1114)  UE Dressing: Setup (08/12/22 1047)  LE Dressing: Moderate assistance;Dependent/Total (08/12/22 1047)  LE Dressing Skilled Clinical Factors: Dep to don/doff socks and shoes. Mod A to don pants over feet and bring to knees. (08/12/22 1047)  Toileting: Maximum assistance (08/04/22 1114)  Additional Comments: Pt participating in ADLs seated in w/c and standing by w/c using w/w for dynamic standing balance, CGA. (08/12/22 1047)  Toilet Transfers  Toilet - Technique: Stand step (08/04/22 1115)  Equipment Used: Grab bars (08/04/22 1115)  Toilet Transfer: Minimal assistance (08/04/22 1115)  Tub Transfers  Tub Transfers: Not tested (08/04/22 1115)  Shower Transfers  Shower - Transfer From: Wheelchair (08/04/22 1115)  Shower - Transfer Type: To and From (08/04/22 1115)  Shower - Transfer To:  Shower seat with back (08/04/22 1115)  Shower - Technique: Stand pivot (08/04/22 1115)  Shower Transfers: Minimal assistance (08/04/22 1115)    Speech Therapy:            Diet/Swallow:                      Lab/X-ray studies reviewed, analyzed and discussed with patient and staff:   Recent Results (from the past 24 hour(s))   Protime-INR    Collection Time: 08/14/22  5:09 AM   Result Value Ref Range    Protime 29.5 (H) 12.3 - 14.9 sec    INR 2.9        CT ABDOMEN PELVIS  7/29/2022  1. The previously placed abscess drain in the right lower quadrant appears to be in the periphery of the abscess with loculations now more apparent in the fluid collection. The fluid collection appears to be grossly the same size as prior to drainage. 2.New mural thrombus is noted in the distal aortic arch extending to the lower thoracic aorta which was not seen on prior study. 3.Note is again made of an aortic aneurysm involving the ascending aorta, aortic arch, and descending aorta. Based on current images, the ascending aorta appears to be 2 mm larger when compared to study dating February 17, 2021. The ascending aorta now measures 5.5 cm. Previously the arch measured 5.3 cm. 4.Note is again made of left lower small pleural effusion with bilateral basilar opacities which may represent atelectasis. 5.Note is again made of a large cystic mass in the pelvis. DIAGNOSIS: Previous cystic lesion adjacent to the ascending colon status post drain placement      Bibasilar atelectasis with left small pleural effusion. Underlying nodules cannot be excluded. The heart is enlarged. The liver is of normal size and attenuation without focal lesions. The spleen is of normal size and attenuation without focal lesions. Pancreas shows no signs of focal mass or peripancreatic fluid collection. Kidneys are normal in size. There is no hydronephrosis. No renal mass is identified. Adrenal glands are unremarkable. Note is again made of a thoracoabdominal aortic aneurysm. The ascending aorta now measures 5.5 cm, this is larger when compared to prior study dating 5.3 cm. 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 This large thrombus was not seen on prior study.  No significant retroperitoneal adenopathy or ascites is identified. Again seen is a cystic lesion involving the proximal ascending aorta which now appears to be more septated than prior. The previously placed drain is now in the periphery of the lesion. CT PELVIS:  A large cystic mass is again seen in the lower pelvis. CT ABDOMEN PELVIS  : 7/18/2022    Residual intravenous contrast medium from recent CT examination identified. Lungs:  Lung bases clear. Cardiac size enlarged, unchanged. Calcification at level of mitral valve. Small hiatal hernia. Liver:  Normal in size, shape, and attenuation. Bile Ducts:  Normal in caliber. Gallbladder:  No stones or wall thickening. Pancreas:  Normal without masses, cysts, ductal dilatation or calcification. Spleen:  Normal in size without masses or calcifications. No splenules. Kidneys:  Normal in size. No hydronephrosis, masses, or stones. Adrenals:  Normal. Small bowel:  Normal in caliber. See \"peritoneum \". Appendix:  Not visualized. Colon:  Normal in caliber. See \"peritoneum \". Peritoneum:  No free air. A bilobed, 10.0 x 8.0 x 10.6 cm mass having central rounded septated areas of low attenuation, 3.7 x 3.5 x 5.4 cm, and displacing cecum and ascending colon anteriorly (series 2, image 50, series 601, image 54, series 602, image 36). No calcification identified. Trace adjacent fat stranding demonstrated. Vessels: Aorta normal in course and caliber. Lymph nodes:  Retroperitoneal:  No enlarged retroperitoneal lymph nodes. Mesenteric:  No enlarged mesenteric lymph nodes. Pelvic: No enlarged pelvic lymph nodes. Ureters: Normal in course and caliber. No calcifications. Bladder: No wall thickening. Reproductive organs: 8.0 x 5.8 x 7.5 cm left adnexal cyst displacing urinary bladder right and laterally, anteriorly, and inferiorly (series 2, image 68, series 601, image 42, series 602, image 52). Abdominal Wall: Fat identified bilateral inguinal canals. Bones:  No bone lesions.  Multilevel disc space narrowing lumbar spine. No post operative changes. Complex 10.0 x 8.0 x 10.6 cm mass with septated central low attenuation fluid collections as discussed. Mass displaces cecum and ascending colon anteriorly. Differential includes malignancy including appendiceal mucocele/carcinoma, as well as retroperitoneal sarcoma with central necrotic change. Infection/abscess secondary consideration, minimal presence of adjacent inflammatory change. 8.0 x 5.8 x 7.5 cm left adnexal cysts as discussed, most likely ovarian in etiology. If clinical concern warrants, pelvic sonography may be obtained for further evaluation. Other findings discussed. CT Head   7/18/2022  1. Age-appropriate cortical atrophy and periventricular microangiopathy. When compared to previous study there has been no significant interval change. 2. No acute hemorrhage or extra-axial fluid collection            CT ABDOMEN PELVIS   7/18/2022     1. In the right lower quadrant of the abdomen, pericolic abscesses versus pericolic mass is seen. This is thought more likely abscess. 2. A cystic mass is again seen in the pelvis and is likely in the ovary. It is unchanged from previous examination The patient will be given oral contrast and rescanned to evaluate the right lower quadrant. Previous extensive, complex labs, notes and diagnostics reviewed and analyzed. 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   Functional and medical status reassessed regarding patients ability to participate in therapies and patient found to be able to participate in acute intensive comprehensive inpatient rehabilitation program including PT/OT to improve balance, ambulation, ADLs, and to improve the P/AROM. Therapeutic modifications regarding activities in therapies, place, amount of time per day and intensity of therapy made daily. In bed therapies or bedside therapies prn. Bowel and Bladder dysfunction nocturia with severe urinary retention, Neurogenic bowel and bladder:  frequent toileting, ambulate to bathroom with assistance, check post void residuals. Check for C.difficile x1 if >2 loose stools in 24 hours, continue bowel & bladder program.  Monitor bowel and bladder function. Lactinex 2 PO every AC. MOM prn, Brown Bomb prn, Glycerin suppository prn, enema prn. Encourage therapy and nursing to co-treat and problem solve re continence. Recent UA was negative. Severe right Abd  pain as well as generalized OA pain: reassess pain every shift and prior to and after each therapy session, give prn Tylenol and consider scheduled Tylenol, modalities prn in therapy, masage, Lidoderm, K-pad prn. Consider scheduled AM pain meds. Skin healing right abd and breakdown risk:  continue pressure relief program.  Daily skin exams and reports from nursing. Fatigue due to nutritional and hydration deficiency: Add and titrate vitamin B12 vitamin D and CoQ10 continue to monitor I&Os, calorie counts prn, dietary consult prn. Add healthy snack at night. Acute episodic insomnia with situational adjustment disorder:  prn Ambien, monitor for day time sedation. Falls risk elevated:  patient to use call light to get nursing assistance to get up, bed and chair alarm. Elevated DVT risk: progressive activities in PT, continue prophylaxis PORTILLO hose, elevation and  Coumadin . Complex discharge planning: Discharge August 17, 2022 to home alone with help from her son with home health care and video monitor. We are suggesting that patient should not be home alone for more than a few minutes. Apparently patient and family do not have that option.   We therapy. Osteoarthritis of lumbar spine with myelopathy-her extremity strengthening    Depression-emotional support provided daily, vitamin B12, encourage participation in rehabilitation support group and recreational therapy, adjust/add medications (B12, Celexa)  Abdominal abscess versus cancer-drain still in place pending cytology-patient is on IV Merrem every 8 hours  SP cultures-Pt has right abd abscess/mass. S/p drainage. SHe also has enlarging Thoraci Aortic   Aneurysm with large mural thrombus burden. Per GI surgery Dr. Jorje Mckay he would like to keep the drain is long as possible. Do not remove the drain. GERD-Elevate head of bed after meals, monitor stools for blood, lowest effective dose of PPI, consider Tums. Yeast rash and immunosuppression-Micatin powder and Floranex   AA now at 5.5cm up from 5.3cm but now there is a new large mural thrombus involving the descending portion of the aortic arch and extending how to the lower thoracic aorta along with large cystic mass. The thrombus was not seen on previous study. Patient had been on xarelto and now on Lovenox transitioning to Coumadin. . Given her abdominal pathology and large thrombus however patient due to her advanced age and high risk for any surgical intervention she is electing for conservative care. Dizziness and gait ataxia-She is getting dizzy especially if he eats she does not get her Antivert in the morning and therapy starts before she can get the Antivert therefore I have asked my shift to give and schedule basis before breakfast and before shift change. Focus on emotional health and caregiver involvement in care.         Patient generally looks better and is participating in all of her therapies also improved appetite  INR 2.7 see Coumadin orders    Pauline Newsome, D.O., PM&R     Attending    [facility] Pamela Cabrera

## 2022-08-15 NOTE — PROGRESS NOTES
1117)  Transfers  Surface: From bed; To bed (08/14/22 1423)  Additional Factors: Hand placement cues (08/14/22 1423)  Device: H[place] Ra (08/14/22 1423)  Sit to Stand  Assistance Level: Supervision (08/14/22 1423)  Stand to Sit  Assistance Level: Supervision (08/14/22 1423)  Skilled Clinical Factors: cues for improving overall quality with ww placement (08/14/22 1423)  Bed To/From Chair  Assistance Level: Supervision (08/06/22 1516)  Stand Pivot  Assistance Level: Contact guard assist (08/04/22 1312)  Gait:   Ambulation  WB Status: wbat (08/12/22 1131)  Ambulation  Surface: carpet (08/12/22 1131)  Device: Rolling Walker (08/12/22 1131)  Other Apparatus: O2 (08/12/22 1131)  Assistance: Supervision (08/12/22 1131)  Quality of Gait: Assist for management of O2 line. Cues for awareness to line with pt attempting to manage with fair follow through. (08/10/22 1146)  Gait Deviations: Slow Raji;Decreased step length (08/12/22 1131)  Distance: 60ft (08/12/22 1131)  Comments: Cues and assist for O2 line management. (08/09/22 1107)  Ambulation  Surface: Level surface (08/14/22 1424)  Device: Rolling walker (08/14/22 1424)  Distance: 100', 20' x 2 (08/14/22 1424)  Activity: Within Room (08/06/22 1517)  Activity Comments: Reciprocal pattern (08/06/22 1517)  Additional Factors: Set-up; Verbal cues (08/14/22 1424)  Assistance Level: Stand by assist (08/14/22 1424)  Gait Deviations: Slow raji;Decreased step length bilateral;Decreased heel strike right;Decreased heel strike left; Wide base of support (08/14/22 1424)  Skilled Clinical Factors: Patient with AFO's donned, mild ff posture, good manuevering around objects (08/14/22 1424)  Stairs:  Stairs/Curb  Stairs?: Yes (08/12/22 1131)  Stairs  # Steps : 4 (08/12/22 1131)  Stairs Height: 6\" (08/12/22 1131)  Rails: Bilateral (08/12/22 1131)  Assistance: Stand by assistance (08/12/22 1131)  Comment: Pt attempted to adjust mask while descending stairs becoming unsteady requiring CGA with 1 UE support. (08/12/22 1131)  Stairs  Stair Height: 6'' (08/06/22 1517)  Device: Bilateral handrails (08/06/22 1517)  Number of Stairs: 4 (08/06/22 1517)  Additional Factors: Set-up; Verbal cues; Non-reciprocal going up;Non-reciprocal going down (08/06/22 1517)  Assistance Level: Contact guard assist (08/06/22 1517)  Skilled Clinical Factors: Pt reuiring CGA with first two steps, but then Min A with third and Mod with fourth. Pt only CGA with descending forward. (08/04/22 1525)  W/C mobility:       Occupational Therapy:   Hand Dominance: Right  ADL  Feeding: Setup (08/04/22 1114)  Grooming: Setup; Increased time to complete (08/04/22 1114)  UE Bathing: Stand by assistance; Increased time to complete (08/04/22 1114)  LE Bathing: Moderate assistance; Increased time to complete (08/04/22 1114)  UE Dressing: Setup (08/12/22 1047)  LE Dressing: Moderate assistance;Dependent/Total (08/12/22 1047)  LE Dressing Skilled Clinical Factors: Dep to don/doff socks and shoes. Mod A to don pants over feet and bring to knees. (08/12/22 1047)  Toileting: Maximum assistance (08/04/22 1114)  Additional Comments: Pt participating in ADLs seated in w/c and standing by w/c using w/w for dynamic standing balance, CGA. (08/12/22 1047)  Toilet Transfers  Toilet - Technique: Stand step (08/04/22 1115)  Equipment Used: Grab bars (08/04/22 1115)  Toilet Transfer: Minimal assistance (08/04/22 1115)  Tub Transfers  Tub Transfers: Not tested (08/04/22 1115)  Shower Transfers  Shower - Transfer From: Wheelchair (08/04/22 1115)  Shower - Transfer Type: To and From (08/04/22 1115)  Shower - Transfer To:  Shower seat with back (08/04/22 1115)  Shower - Technique: Stand pivot (08/04/22 1115)  Shower Transfers: Minimal assistance (08/04/22 1115)    Speech Therapy:            Diet/Swallow:                      Lab/X-ray studies reviewed, analyzed and discussed with patient and staff:   Recent Results (from the past 24 hour(s))   Protime-INR    Collection Time: 08/14/22  5:09 AM   Result Value Ref Range    Protime 29.5 (H) 12.3 - 14.9 sec    INR 2.9        CT ABDOMEN PELVIS  7/29/2022  1. The previously placed abscess drain in the right lower quadrant appears to be in the periphery of the abscess with loculations now more apparent in the fluid collection. The fluid collection appears to be grossly the same size as prior to drainage. 2.New mural thrombus is noted in the distal aortic arch extending to the lower thoracic aorta which was not seen on prior study. 3.Note is again made of an aortic aneurysm involving the ascending aorta, aortic arch, and descending aorta. Based on current images, the ascending aorta appears to be 2 mm larger when compared to study dating February 17, 2021. The ascending aorta now measures 5.5 cm. Previously the arch measured 5.3 cm. 4.Note is again made of left lower small pleural effusion with bilateral basilar opacities which may represent atelectasis. 5.Note is again made of a large cystic mass in the pelvis. DIAGNOSIS: Previous cystic lesion adjacent to the ascending colon status post drain placement      Bibasilar atelectasis with left small pleural effusion. Underlying nodules cannot be excluded. The heart is enlarged. The liver is of normal size and attenuation without focal lesions. The spleen is of normal size and attenuation without focal lesions. Pancreas shows no signs of focal mass or peripancreatic fluid collection. Kidneys are normal in size. There is no hydronephrosis. No renal mass is identified. Adrenal glands are unremarkable. Note is again made of a thoracoabdominal aortic aneurysm. The ascending aorta now measures 5.5 cm, this is larger when compared to prior study dating 5.3 cm. 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 This large thrombus was not seen on prior study.  No significant space narrowing lumbar spine. No post operative changes. Complex 10.0 x 8.0 x 10.6 cm mass with septated central low attenuation fluid collections as discussed. Mass displaces cecum and ascending colon anteriorly. Differential includes malignancy including appendiceal mucocele/carcinoma, as well as retroperitoneal sarcoma with central necrotic change. Infection/abscess secondary consideration, minimal presence of adjacent inflammatory change. 8.0 x 5.8 x 7.5 cm left adnexal cysts as discussed, most likely ovarian in etiology. If clinical concern warrants, pelvic sonography may be obtained for further evaluation. Other findings discussed. CT Head   7/18/2022  1. Age-appropriate cortical atrophy and periventricular microangiopathy. When compared to previous study there has been no significant interval change. 2. No acute hemorrhage or extra-axial fluid collection            CT ABDOMEN PELVIS   7/18/2022     1. In the right lower quadrant of the abdomen, pericolic abscesses versus pericolic mass is seen. This is thought more likely abscess. 2. A cystic mass is again seen in the pelvis and is likely in the ovary. It is unchanged from previous examination The patient will be given oral contrast and rescanned to evaluate the right lower quadrant. Previous extensive, complex labs, notes and diagnostics reviewed and analyzed. 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   Functional and medical status reassessed regarding patients ability to participate in therapies and patient found to be able to participate in acute intensive comprehensive inpatient rehabilitation program including PT/OT to improve balance, ambulation, ADLs, and to improve the P/AROM. Therapeutic modifications regarding activities in therapies, place, amount of time per day and intensity of therapy made daily. In bed therapies or bedside therapies prn. Bowel and Bladder dysfunction nocturia with severe urinary retention, Neurogenic bowel and bladder:  frequent toileting, ambulate to bathroom with assistance, check post void residuals. Check for C.difficile x1 if >2 loose stools in 24 hours, continue bowel & bladder program.  Monitor bowel and bladder function. Lactinex 2 PO every AC. MOM prn, Brown Bomb prn, Glycerin suppository prn, enema prn. Encourage therapy and nursing to co-treat and problem solve re continence. Recent UA was negative. Severe right Abd  pain as well as generalized OA pain: reassess pain every shift and prior to and after each therapy session, give prn Tylenol and consider scheduled Tylenol, modalities prn in therapy, masage, Lidoderm, K-pad prn. Consider scheduled AM pain meds. Skin healing right abd and breakdown risk:  continue pressure relief program.  Daily skin exams and reports from nursing. Fatigue due to nutritional and hydration deficiency: Add and titrate vitamin B12 vitamin D and CoQ10 continue to monitor I&Os, calorie counts prn, dietary consult prn. Add healthy snack at night. Acute episodic insomnia with situational adjustment disorder:  prn Ambien, monitor for day time sedation. Falls risk elevated:  patient to use call light to get nursing assistance to get up, bed and chair alarm. Elevated DVT risk: progressive activities in PT, continue prophylaxis PORTILLO hose, elevation and  Coumadin . Complex discharge planning: Discharge August 17, 2022 to home alone with help from her son with home health care and video monitor. We are suggesting that patient should not be home alone for more than a few minutes. Apparently patient and family do not have that option.   We are hoping that patient can go stay with one of her children though she is quite against the idea. He is competent to make her own decisions. Weekly team meeting every Monday to re-assess progress towards goals, discuss and address social, psychological and medical comorbidities and to address difficulties they may be having progressing in therapy. Patient and family education is in progress. The patient is to follow-up with their family physician after discharge. Complex Active General Medical Issues that complicate care Assess & Plan:     A-fib,   Essential hypertension, Acute on chronic combined systolic (congestive) and diastolic (congestive) heart failure-Acute rehab to monitor heart rate and rhythm with the option of telemetry and the effects of chronotropic medication with respect to increasing physical activity and exercise in PT, OT, ADLs with medication titration to lowest effective dosing. Continue blood signs every shift focusing on heart rate, rhythm and blood pressure checks with orthostatic checks-monitoring the effect of exercise, therapy and posture. Consult hospitalist for backup medical and adjust/add medications (Lovenox transitioning to Coumadin patient had been on Xarelto, Coreg). Monitor heart rate and blood pressure as well as medications effects on vital signs before during and after therapy with especial focus on preventing orthostasis and falls risk. Hyponatremia-recheck BMP avoid excessive water  Anemia-Monitor vital signs monitor for orthostasis and tachycardia, check H&H prn. Vitamin B12 and iron-dose iron with food to prevent GI upset. Monitor for constipation. Monitor stools for blood. Hypothyroid-  Synthroid and titrate dosing. Follow vital signs, recheck TSH and thyroid studies as outpatient.     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 Osteoarthritis of lumbar spine with myelopathy-her extremity strengthening    Depression-emotional support provided daily, vitamin B12, encourage participation in rehabilitation support group and recreational therapy, adjust/add medications (B12, Celexa)  Abdominal abscess versus cancer-drain still in place pending cytology-patient is on IV Merrem every 8 hours  SP cultures-Pt has right abd abscess/mass. S/p drainage. SHe also has enlarging Thoraci Aortic   Aneurysm with large mural thrombus burden. Per GI surgery Dr. Griselda Mccune he would like to keep the drain is long as possible. Do not remove the drain. GERD-Elevate head of bed after meals, monitor stools for blood, lowest effective dose of PPI, consider Tums. Yeast rash and immunosuppression-Micatin powder and Floranex   AA now at 5.5cm up from 5.3cm but now there is a new large mural thrombus involving the descending portion of the aortic arch and extending how to the lower thoracic aorta along with large cystic mass. The thrombus was not seen on previous study. Patient had been on xarelto and now on Lovenox transitioning to Coumadin. . Given her abdominal pathology and large thrombus however patient due to her advanced age and high risk for any surgical intervention she is electing for conservative care. Dizziness and gait ataxia-She is getting dizzy especially if he eats she does not get her Antivert in the morning and therapy starts before she can get the Antivert therefore I have asked my shift to give and schedule basis before breakfast and before shift change. Focus on emotional health and caregiver involvement in care.         Patient generally looks better and is participating in all of her therapies also improved appetite  INR 2.9 see Coumadin orders    Anayeli Ospina D.O., PM&R     Attending    [address]

## 2022-08-15 NOTE — DISCHARGE SUMMARY
Subjective: The patient complains of  moderate to severe acute    Right sided abdominal pain partially relieved by rest, PT, OT, drain and exacerbated by recent illness and exertion. I had been concerned about patients medical complexities and current active problems and barriers to progress including - recently presented with an abdominal mass felt to be either cancer or an abscess. Imaging was thinking it was more like an abscess. She was admitted to [facility] placed on antibiotics and a drain was placed under interventional radiology's guidance. I discussed current functional, rehabilitation, medical status with other rehabilitation providers including nursing and case management. According to recent nursing note, \" Pt asking that RUQ drain be removed prior to d/c home, explained to pt that the drain must stay in place as long as it is draining fluid per the doctor. Scant drainage at this time, will educate pt on managing accordion drain when there is enough fluid to drain. Pt denies pain, oxygen 3L/min per nasal cannula in place. Pt reports a BM earlier this morning\". I am concerned about her occasional diarrhea and dizziness. I have added lactobacillus and Zofran. Still is having some abdominal pain at the drain site. She is very tough and does not like to take any medications unless is necessary. I had a long discussion with her and her son and we do feel that is necessary to improve her quality of life and ease her suffering. She will take scheduled Tylenol 3 a day and I will add as needed Norco if things get bad. ROS x10: The patient also complains of severely impaired mobility and activities of daily living. Otherwise no new problems with vision, hearing, nose, mouth, throat, dermal, cardiovascular, GI, , pulmonary, musculoskeletal, psychiatric or neurological. See Rehab H&P on Rehab chart dated .        Vital signs:  /75   Pulse 63   Temp 98.4 °F (36.9 °C) Resp 16   Ht 5' (1.524 m)   Wt 159 lb 3.2 oz (72.2 kg)   SpO2 98%   BMI 31.09 kg/m²   I/O:   PO/Intake:  fair PO intake, no problems observed or reported. Bowel/Bladder:  continent,  immodium for diarrhea and urinary urgency. General:  Patient is well developed, adequately nourished, non-obese and     well kempt. HEENT:    PERRLA, hearing intact to loud voice, external inspection of ear     and nose benign. Inspection of lips, tongue and gums    Musculoskeletal: No significant change in strength or tone. All joints stable. Inspection and palpation of digits and nails show no clubbing,       cyanosis or inflammatory conditions. Neuro/Psychiatric: Affect: flat. Alert and oriented to person, place and     situation. No significant change in deep tendon reflexes or     Sensation    Lungs:  Diminished, CTA-B. Respiration effort is normal at rest.     Heart:   S1 = S2, RRR. No loud murmurs. Abdomen:  Soft, non-tender, no enlargement of liver or spleen-75cc of bloody drainage in drainage bag-right abdomen- tenderness at the drain site  Extremities:  Trace lower extremity edema,    tenderness. Skin:   Intact to general survey, no visualized or palpated problems  right abd drain-scant drainage. Rehabilitation:  Physical therapy: FIMS:  Bed Mobility:      Transfers: Sit to Stand: Modified independent  Stand to sit: Modified independent  Bed to Chair: Modified independent, WB Status: wbat  Ambulation  Surface: carpet  Device: Rolling Walker  Other Apparatus: O2  Assistance: Supervision  Quality of Gait: Assist for management of O2 line. Cues for awareness to line with pt attempting to manage with fair follow through.   Gait Deviations: Slow Lea, Decreased step length  Distance: 60ft  Comments: NT today d/t reports of increased dizziness with standing., Stairs  # Steps : 4  Stairs Height: 6\"  Rails: Bilateral  Assistance: Stand by assistance  Comment: Pt attempted to adjust mask while occasional diarrhea on antibiotics, and Bladder dysfunction monitoring neurogenic bladder:  frequent toileting, ambulate to bathroom with assistance, check post void residuals. Check for C.difficile x1 if >2 loose stools in 24 hours, continue bowel & bladder program.  Monitor bowel and bladder function. Lactinex 2 PO every AC. MOM prn, Brown Bomb prn, Glycerin suppository prn, enema prn. Severe abdominal  pain as well as generalized OA pain,   Lumbar stenosis with neurogenic claudication: reassess pain every shift and prior to and after each therapy session, give Tylenol and prn Tylenol and Norco, modalities prn in therapy, Lidoderm, K-pad prn. Skin healing and breakdown risk:  continue pressure relief program.  Daily skin exams and reports from nursing. Severe fatigue due to nutritional and hydration deficiency: Add vitamin B12 vitamin D and CoQ10 continue to monitor I&Os, calorie counts prn, dietary consult prn. Add healthy HS snack. Acute episodic insomnia with situational adjustment disorder:  consider prn low dose Ambien, monitor for day time sedation. Add HS \"Tuck In\"  Falls risk elevated:  patient to use call light to get nursing assistance to get up, bed and chair alarm. Elevated DVT risk: progressive activities in PT, continue prophylaxis PORTILLO hose, elevation and Xarelto. Complex discharge planning: Plan for discharge Friday, August 5, 2022 Skilled then eventually to home alone with support from her son who lives close by and her daughter-in-law. We will also have home health care come out PT OT RN aide and push possibly a . Progress toward her final weekly team meeting    Monday to re-assess progress towards goals, discuss and address social, psychological and medical comorbidities and to address difficulties they may be having progressing in therapy. Patient and family education is in progress. The patient is to follow-up with their family physician after discharge. Complex Active General Medical Issues that complicate care addressed during rehab stay:     1. 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 Continue blood signs every shift focusing on heart rate, rhythm and blood pressure checks with orthostatic checks-monitoring the effect of exercise, therapy and posture. Consult hospitalist for backup medical and adjust/add medications (Xarelto, Coreg). Monitor heart rate and blood pressure as well as medications effects on vital signs before during and after therapy with especial focus on preventing orthostasis and falls risk. Patient was discharged to medical floor with probable [facility] transfer. See above    Hyponatremia-recheck BMP avoid excessive water  Anemia-Monitor vital signs monitor for orthostasis and tachycardia, check H&H prn. Vitamin B12 and iron-dose iron with food to prevent GI upset. Monitor for constipation. Monitor stools for blood. Hypothyroid-  Synthroid and titrate dosing. Follow vital signs, recheck TSH and thyroid studies as outpatient. 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     Osteoarthritis of lumbar spine with myelopathy-her extremity strengthening    Depression-emotional support provided daily, vitamin B12, encourage participation in rehabilitation support group and recreational therapy, adjust/add medications (B12, Celexa)  Abdominal mass abscess versus cancer-drain still in place pending cytology-patient is on IV Zosyn pending cultures. Closely monitor drainage from her drain site. GERD-Elevate head of bed after meals, monitor stools for blood, lowest effective dose of PPI, consider Tums. Yeast rash and immunosuppression-Micatin powder and Floranex        Focus on endurance, activity pacing, reassessing rehab goals and discharge planning. Marie Shirley D.O., PM&R     Attending    [address]   Focus on endurance, activity pacing, reassessing rehab goals and discharge planning.

## 2022-08-15 NOTE — PROGRESS NOTES
INDIVIDUALIZED OVERALL REHAB PLAN OF CARE  ADDENDUM TO REHAB PROGRESS NOTE-for audit purposes must also refer to this day's clinical note and combine the information      Date: 8/15/2022  Patient Name: Mert Coleman   Room: N607/R300-04    MRN: [id number]    : 1932  (80 y.o.)  Gender: female       Today 8/15/2022 during weekly team meeting, I reviewed the patient Mert Coleman in detail with the therapists and nurses involved in patient's care gathering complex physiatric data regarding current medical issues, progress in therapies, factors limiting progress, social issues, psychological issues, ongoing therapeutic plans and discharge planning. Legend:  I= independent Im =Modified independent  S=Supervised SB=stand by MAGANA=set up CG=contact fran Min= minimal Mod=Moderate Max=maximal Max of 2 =maximal assist of 2 people      CURRENT FUNCTIONAL STATUS:    Barriers to progress and discharge complex medical conditions, severe pain, and complex social situations      NURSING ISSUES:     Pt asking that RUQ drain be removed prior to d/c home, explained to pt that the drain must stay in place as long as it is draining fluid per the doctor. Scant drainage at this time, will educate pt on managing accordion drain when there is enough fluid to drain. Pt denies pain, oxygen 3L/min per nasal cannula in place. Pt reports a BM earlier this morning. Nursing will continue to focus on bowel and bladder continence transitioning toward independence by time of discharge. Monitoring post void residuals monitoring for severe constipation and bowel obstruction.     Bowel function- continent/normal  Plans to address- teach spinal cord style bowel program     Bladder function-  continent    Plans to address- schedule voids before bed and therapy     Skin deficits- dressing changes   Plans to address- special mattress     Hydration/Nutritional deficits- monitoring for dysphagia  Plans to address-Push PO, assist with feeds as needed     Low BP- continue to monitor Ortho BPs  Plans to address- check ortho BPs before therapies-and use abdominal binder and TEDs as needed    Pt and Family training goals-  teach home technology options like Pretty use and home assistive devices      Focus on achieving ADL goals with co-treating with OT when possible. Focus on cognition and co treat with SLP when possible. PHYSICAL THERAPY  Bed mobility:  Bed mobility  Bridging: Stand by assistance (08/04/22 1014)  Rolling to Left: Modified independent (08/12/22 1130)  Rolling to Right: Modified independent (08/12/22 1130)  Supine to Sit: Modified independent (08/12/22 1130)  Sit to Supine: Modified independent (08/12/22 1130)  Bed Mobility Comments: increased time and effort to complete all transitions. Hand over hand assist to complete each activity. (08/04/22 1014)  Bed Mobility  Additional Factors: Head of bed flat; With handrails;Verbal cues; Increased time to complete (08/14/22 1423)  Additional Factors: Head of bed flat; With handrails;Verbal cues; Increased time to complete (08/14/22 1423)  Roll Left  Assistance Level: Stand by assist (08/14/22 1423)  Roll Right  Assistance Level: Stand by assist (08/14/22 1423)  Sit to Supine  Assistance Level: Stand by assist (08/14/22 1423)  Supine to Sit  Assistance Level: Supervision (08/14/22 1423)  Scooting  Assistance Level: Minimal assistance (08/14/22 1423)  Skilled Clinical Factors: requires minimal assistance when supine to scoot to [facility] (08/14/22 1423)  Transfers:  Transfers  Sit to Stand: Supervision (08/12/22 1131)  Stand to sit: Supervision (08/12/22 1131)  Bed to Chair: Supervision (08/11/22 1117)  Car Transfer: Stand by assistance (VCs/TCs after demo for safest technique.) (08/12/22 1131)  Comment: Patient requires min A to stand from chair without arm rest (08/11/22 1117)  Transfers  Surface: From bed; To bed (08/14/22 1423)  Additional Factors: Hand placement cues (08/14/22 1423)  Device: Vy Rota (08/14/22 1423)  Sit to Stand  Assistance Level: Supervision (08/14/22 1423)  Stand to Sit  Assistance Level: Supervision (08/14/22 1423)  Skilled Clinical Factors: cues for improving overall quality with ww placement (08/14/22 1423)  Bed To/From Chair  Assistance Level: Supervision (08/06/22 1516)  Stand Pivot  Assistance Level: Contact guard assist (08/04/22 1312)  Gait:   Ambulation  WB Status: wbat (08/12/22 1131)  Ambulation  Surface: carpet (08/12/22 1131)  Device: Rolling Walker (08/12/22 1131)  Other Apparatus: O2 (08/12/22 1131)  Assistance: Supervision (08/12/22 1131)  Quality of Gait: Assist for management of O2 line. Cues for awareness to line with pt attempting to manage with fair follow through. (08/10/22 1146)  Gait Deviations: Slow Raji;Decreased step length (08/12/22 1131)  Distance: 60ft (08/12/22 1131)  Comments: Cues and assist for O2 line management. (08/09/22 1107)  Ambulation  Surface: Level surface (08/14/22 1424)  Device: Rolling walker (08/14/22 1424)  Distance: 100', 20' x 2 (08/14/22 1424)  Activity: Within Room (08/06/22 1517)  Activity Comments: Reciprocal pattern (08/06/22 1517)  Additional Factors: Set-up; Verbal cues (08/14/22 1424)  Assistance Level: Stand by assist (08/14/22 1424)  Gait Deviations: Slow raji;Decreased step length bilateral;Decreased heel strike right;Decreased heel strike left; Wide base of support (08/14/22 1424)  Skilled Clinical Factors: Patient with AFO's donned, mild ff posture, good manuevering around objects (08/14/22 1424)  Stairs:  Stairs/Curb  Stairs?: Yes (08/12/22 1131)  Stairs  # Steps : 4 (08/12/22 1131)  Stairs Height: 6\" (08/12/22 1131)  Rails: Bilateral (08/12/22 1131)  Assistance: Stand by assistance (08/12/22 1131)  Comment: Pt attempted to adjust mask while descending stairs becoming unsteady requiring CGA with 1 UE support. (08/12/22 1131)  Stairs  Stair Height: 6'' (08/06/22 6777)  Device: Bilateral handrails (08/06/22 5460)  Number of Stairs: 4 (08/06/22 1517)  Additional Factors: Set-up; Verbal cues; Non-reciprocal going up;Non-reciprocal going down (08/06/22 1517)  Assistance Level: Contact guard assist (08/06/22 1517)  Skilled Clinical Factors: Pt reuiring CGA with first two steps, but then Min A with third and Mod with fourth. Pt only CGA with descending forward. (08/04/22 1525)  W/C mobility:           Pt and Family training goals-  Focus on sequencing and endurance        OCCUPATIONAL THERAPY  Grooming/Oral Hygiene  Assistance Level: Independent (08/11/22 1448)  Skilled Clinical Factors: wash hands in standing (08/10/22 1107)  Upper Extremity Bathing  Assistance Level: Set-up (08/10/22 0941)  Skilled Clinical Factors: Patient completed sponge bath seated in armchair at bathrooom sink (08/10/22 0941)  Lower Extremity Bathing  Assistance Level: Set-up (08/10/22 0941)  Skilled Clinical Factors: B feet (08/08/22 1021)  Upper Extremity Dressing  Assistance Level: Set-up (08/10/22 0941)  Skilled Clinical Factors: pull down back (08/08/22 1021)  Lower Extremity Dressing  Assistance Level: Supervision; Increased time to complete (08/10/22 0941)  Skilled Clinical Factors: thread R LE x 1/2 (08/09/22 1021)  Putting On/Taking Off Footwear  Equipment Provided: Sock aid (08/10/22 0941)  Assistance Level: Dependent (08/11/22 1448)  Skilled Clinical Factors: AFO's/shoes (08/11/22 1448)  Toileting  Assistance Level: Modified independent; Increased time to complete (08/11/22 1448)  Skilled Clinical Factors: Rivera with clothing management, pt reporting difficulty due to drain, [place] hygiene (08/10/22 1107)  Toilet Transfers  Technique: Stand step (08/11/22 1448)  Equipment: Grab bars;Standard toilet (08/11/22 1448)  Additional Factors: Verbal cues (08/10/22 1107)  Assistance Level: Supervision (08/11/22 1448)  Skilled Clinical Factors: ww (08/11/22 1448)  Tub/Shower Transfers  Type: Shower (08/08/22 1021)  Transfer From: [facility] (08/08/22 home)    ADL Assistance: Independent, Homemaking Assistance: Independent    Homemaking Responsibilities: Yes    Ambulation Assistance: Independent, Transfer Assistance: Independent    Additional Comments: Son stops over 2 times daily         Social Determinants of Health     Financial Resource Strain: Not on file   Food Insecurity: Not on file   Transportation Needs: Not on file   Physical Activity: Not on file   Stress: Not on file   Social Connections: Not on file   Intimate Partner Violence: Not on file   Housing Stability: Not on file           THERAPY, MEDICAL AND NURSING COORDINATION:    [x]  Pain medication before therapies     [x]  Check orthostatic BP and monitor heart rate and medications effects with therapy      [x]  Ambulate to the bathroom in room    [x]  Add scheduled rest beaks     [x]  In room therapies as needed      Discharge date set for:               August 17, 2022--SNF      No longer able to go Home with:    Alone vs with dtr  with help from   dtr  and son                  Or preferably     Outpatient Therapy:  []  PT    []  OT    []  ST   []  Rehab Psych                 Equipment:  [employer]      At D/C their function is goaled at:   PT:Long term goal 1: Pt to complete bed mobility with indep  Long term goal 2: Pt to complete transfers with indep  Long term goal 3: Pt to ambulate 50ft with LRD and indep  Long term goal 4: Pt to manage 2 steps with B HR and Supervision  Long term goal 5: Pt to complete TUG within 20sec to demonstrate improved functional mobility/balance  OT:Eating  Assistance Needed: Setup or clean-up assistance  CARE Score: 5  Discharge Goal: Independent, Oral Hygiene  Assistance Needed: Setup or clean-up assistance  CARE Score: 5  Discharge Goal: Independent, [address] needed: Substantial/maximal assistance  CARE Score: 2  Discharge Goal: Independent, Shower/Bathe Self  Assistance Needed: Partial/moderate assistance  CARE Score: 3  Discharge Goal: Independent  Upper Body Dressing  Assistance Needed: Supervision or touching assistance  CARE Score: 4  Discharge Goal: Independent, Lower Body Dressing  Assistance Needed: Partial/moderate assistance  CARE Score: 3  Discharge Goal: Independent, Putting On/Taking Off Footwear  Assistance Needed: Dependent  CARE Score: 1  Discharge Goal: Independent, Toilet Transfer  Assistance needed: Partial/moderate assistance  CARE Score: 3  Discharge Goal: Independent  SP:               From a cognitive standpoint they will need:        24 hr supervision  --progress to occasional            Significant problems/ barriers to functional progress include: Pt is at a high risk for functional loss,      [x]  Acute infection/UTI    []  Low BP's     []  COPD flare-up   []  Uncontrolled blood sugar     []  Progressive anemia     [x]  poor endurance    [x]  Severe pain exacerbation     [x]  Impaired mental status-mild    []  Urinary incontinence    []  Bowel incontinence           Plan to correct barriers to functional progress: Add scheduled rest breaks, CoQ 10 and Vit B 12, control pain by using ice Lidoderm rest and massage as well as pain medications prior to therapy. Spread therapy of 15 hours out over a 7 day window to accommodate rest breaks and medical interventions. Patient seems to be making fair to good response to these interventions. Based on a comprehensive evaluation of the above, the individualized therapy and Discharge plan will be:    -Times stated are an average that will be varied based on the patient's daily need.        PT    1 1/2  hrs/day 5-7 days per week      OT    1 1/2 hrs per day 5-7 days per week     ST     1/2    hrs /day 3-5 days per week       Estimated LOS   2 week(s)    - Overall functional prognosis:     [x]  Good    []  Fair    []  Poor -Medical Prognosis:   [x]  Good    []  Fair    []  Poor    This patient was made aware of the discussion of Plan of Care, their projected dicharge date and their projected function at discharge.          Ian Presley, DO

## 2022-08-15 NOTE — CARE COORDINATION
LSW and RN Case Manager spoke with pt's son and explained barriers to pt's discharge which include needing 24/7 supervision, drain, IV atb, O2 tubing, and coumadin. Pt's son verbalized understanding and plans to come in today around 12:15PM to discuss with pt. LSW and RN Case Manager will be present during the meeting as well. At this time, a [facility]  still has not been secured by [facility]. Electronically signed by KIN Ruiz LSW on 8/15/2022 at 10:57 AM      LSW and RN Case Manager met with pt, pt's son, and pt's dtr-in-law and discussed discharging home vs SNF placement. It was reported that [facility]T feels that SNF would be best option due to multiple complexities pt is facing at this time. Pt's family stated that if pt discharges to SNF, then they would like 1. [facility] 2. [employer] or 3. [employer]. Pt's son plans to call LSW on 8/16 to notify of decision of home vs SNF.

## 2022-08-15 NOTE — PROGRESS NOTES
Warfarin Dosing - Pharmacy Consult Note  Consulting Provider: Val Hunter  Indication: Thrombus  Warfarin Dose prior to admission: New start   Concurrent anticoagulants/antiplatelets: none  Significant Drug Interactions: No obvious interactions  Recent Labs     08/13/22  0530 08/14/22  0509 08/15/22  0446   INR 2.7 2.9 3.2     Recent warfarin administrations                     warfarin (COUMADIN) tablet 5 mg (mg) 5 mg Given 08/14/22 1725    warfarin (COUMADIN) tablet 7.5 mg (mg) 7.5 mg Given 08/13/22 1851    warfarin (COUMADIN) tablet 7.5 mg (mg) 7.5 mg Given 08/12/22 1740                   Date       INR    Dose  08/15/22    3.2               5 mg    Assessment/Plan  (Goal INR: 2 - 3)  Will give 5 mg warfarin today. Active problem list reviewed. INR orders are placed. Chart reviewed for pertinent labs, drug/diet interactions, and past doses. Documentation of patient's clinical condition was reviewed. Pharmacy Dosing:  Pharmacy will continue to follow.

## 2022-08-15 NOTE — PROGRESS NOTES
OCCUPATIONAL THERAPY  INPATIENT REHAB TREATMENT NOTE  [facility]      NAME: Lucie Torres  : 1932 (80 y.o.)  MRN: [id number]  CODE STATUS: DNR-CC  [facility]    Date of Service: 8/15/2022    Referring Physician: Dr. 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 Block assembly    Challenged pt to assemble blocks of different colors into various 3D structures according to a visual model presented on a piece of paper. Pt completed 4 patterns. Pt completed the structures on the table top while looking at the reference card  Started with less difficult symmetrical designs and graded up  Pt needing moderate cues for most puzzles and max cues for more difficult structures- question understanding of directions vs confusion  Pieces were fairly neatly and correctly placed with assistance    While seated, completed fine motor task with focus on coordination, activity tolerance, and strength in order to improve general function. Also completed small wooden puzzle with circular pieces of various diameters. Pt instructed to use bilateral UEs to place pieces. Good reasoning and problem solving noted    Education:   POC, compensatory strategies     ASSESSMENT:   Good participation. Difficult to communicate directions due to language barrier.      PLAN OF CARE:  Strengthening, Balance training, Functional mobility training, Neuromuscular re-education, Endurance training, Self-Care / ADL, Cognitive/Perceptual training, Safety education & training, Home management training, Patient/Caregiver education & training  Continue per OT POC for planned d/c on 8-17-22    Patient goals : \"to be able to wash my back and my feet. \"  Time Frame for Long term goals : 2 weeks  Long Term Goal 1: Pt within two weeks of evaluation will demonstrate progress to goals to address areas as stated below to increase functional independence for return to PLOF  Long Term Goal 2: Pt will increase independence with ADL Pt will increase independence with ADL transfers  Long Term Goal 3: Pt will increase bilateral UE strength to increase ease of ADL transfers    Therapy Time:   Individual Group Co-Treat   Time In 1530       Time Out 1600         Minutes 30         Therapeutic activities: 30 minutes     Electronically signed by:    Daljit Farah OT,   8/15/2022, 3:36 PM

## 2022-08-15 NOTE — PROGRESS NOTES
Subjective: The patient complains of  moderate to severe acute    Right sided abdominal pain partially relieved by rest, PT, OT, drain and exacerbated by recent illness and exertion. I had been concerned about patients medical complexities and current active problems and barriers to progress including - recently presented with an abdominal mass felt to be either cancer or an abscess. Imaging was thinking it was more like an abscess. She was admitted to [facility] placed on antibiotics and a drain was placed under interventional radiology's guidance. I discussed current functional, rehabilitation, medical status with other rehabilitation providers including nursing and case management. According to recent nursing note, \" Pt asking that RUQ drain be removed prior to d/c home, explained to pt that the drain must stay in place as long as it is draining fluid per the doctor. Scant drainage at this time, will educate pt on managing accordion drain when there is enough fluid to drain. Pt denies pain, oxygen 3L/min per nasal cannula in place. Pt reports a BM earlier this morning\". I am concerned about her occasional diarrhea and dizziness. I have added lactobacillus and Zofran. Still is having some abdominal pain at the drain site. She is very tough and does not like to take any medications unless is necessary. I had a long discussion with her and her son and we do feel that is necessary to improve her quality of life and ease her suffering. She will take scheduled Tylenol 3 a day and I will add as needed Norco if things get bad. ROS x10: The patient also complains of severely impaired mobility and activities of daily living. Otherwise no new problems with vision, hearing, nose, mouth, throat, dermal, cardiovascular, GI, , pulmonary, musculoskeletal, psychiatric or neurological. See Rehab H&P on Rehab chart dated .        Vital signs:  BP 91/62   Pulse 90   Temp 98.4 °F (36.9 °C) Resp 16   Ht 5' (1.524 m)   Wt 159 lb 3.2 oz (72.2 kg)   SpO2 95%   BMI 31.09 kg/m²   I/O:   PO/Intake:  fair PO intake, no problems observed or reported. Bowel/Bladder:  continent,  immodium for diarrhea and urinary urgency. General:  Patient is well developed, adequately nourished, non-obese and     well kempt. HEENT:    PERRLA, hearing intact to loud voice, external inspection of ear     and nose benign. Inspection of lips, tongue and gums    Musculoskeletal: No significant change in strength or tone. All joints stable. Inspection and palpation of digits and nails show no clubbing,       cyanosis or inflammatory conditions. Neuro/Psychiatric: Affect: flat. Alert and oriented to person, place and     situation. No significant change in deep tendon reflexes or     Sensation    Lungs:  Diminished, CTA-B. Respiration effort is normal at rest.     Heart:   S1 = S2, RRR. No loud murmurs. Abdomen:  Soft, non-tender, no enlargement of liver or spleen-75cc of bloody drainage in drainage bag-right abdomen- tenderness at the drain site  Extremities:  Trace lower extremity edema,    tenderness. Skin:   Intact to general survey, no visualized or palpated problems  right abd drain-scant drainage. Rehabilitation:  Physical therapy: FIMS:  Bed Mobility:      Transfers: Sit to Stand: Modified independent  Stand to sit: Modified independent  Bed to Chair: Modified independent, WB Status: wbat  Ambulation  Surface: carpet  Device: Rolling Walker  Other Apparatus: O2  Assistance: Supervision  Quality of Gait: Assist for management of O2 line. Cues for awareness to line with pt attempting to manage with fair follow through.   Gait Deviations: Slow Lea, Decreased step length  Distance: 60ft  Comments: NT today d/t reports of increased dizziness with standing., Stairs  # Steps : 4  Stairs Height: 6\"  Rails: Bilateral  Assistance: Stand by assistance  Comment: Pt attempted to adjust mask while descending stairs becoming unsteady requiring CGA with 1 UE support. FIMS:  ,  ,      Occupational therapy: FIMS:   ,  , Assessment: Pt continues to benefit from OT services to increase independence, safety and balance when performing self care ADLs. Speech therapy: FIMS:        Lab/X-ray studies reviewed, analyzed and discussed with patient and staff:   Recent Results (from the past 24 hour(s))   Protime-INR    Collection Time: 08/15/22  4:46 AM   Result Value Ref Range    Protime 32.0 (H) 12.3 - 14.9 sec    INR 3.2    CBC    Collection Time: 08/15/22  2:00 PM   Result Value Ref Range    WBC 7.0 4.8 - 10.8 K/uL    RBC 4.25 4.20 - 5.40 M/uL    Hemoglobin 11.6 (L) 12.0 - 16.0 g/dL    Hematocrit 35.5 (L) 37.0 - 47.0 %    MCV 83.5 82.0 - 100.0 fL    MCH 27.3 27.0 - 31.3 pg    MCHC 32.7 (L) 33.0 - 37.0 %    RDW 16.1 (H) 11.5 - 14.5 %    Platelets 940 878 - 647 K/uL         Previous extensive, complex labs, notes and diagnostics reviewed and analyzed. ALLERGIES:    Allergies as of 08/03/2022 - Fully Reviewed 08/03/2022   Allergen Reaction Noted    Latex  07/19/2017    Tape [adhesive tape]  06/28/2016      (please also verify by checking MAR)     Today I evaluated this patient for periodic reassessment of medical and functional status. The patient was discussed in detail at the treatment team meeting focusing on current medical issues, progress in therapies, social issues, psychological issues, barriers to progress and strategies to address these barriers, and discharge planning. See the addendum to rehab progress note-as a second progress note in the chart. The patient continues to be high risk for future disability and their medical and rehabilitation prognosis continue to be good and therefore, we will continue the patient's rehabilitation course as planned. The patient's tentative discharge date was set. Patient and family education was discussed.   The patient was made aware of the team discussion regarding their progress. Complex Physical Medicine & Rehab Issues addressed during rehab stay:   Severe abnormality of gait and mobility and impaired self-care and ADL's secondary to progressive weakness dt   CMT neuropathy . Functional and medical status deteriorated and patient needed to be transferred off the floor see details above. Bowel with occasional diarrhea on antibiotics, and Bladder dysfunction monitoring neurogenic bladder:  frequent toileting, ambulate to bathroom with assistance, check post void residuals. Check for C.difficile x1 if >2 loose stools in 24 hours, continue bowel & bladder program.  Monitor bowel and bladder function. Lactinex 2 PO every AC. MOM prn, Brown Bomb prn, Glycerin suppository prn, enema prn. Severe abdominal  pain as well as generalized OA pain,   Lumbar stenosis with neurogenic claudication: reassess pain every shift and prior to and after each therapy session, give Tylenol and prn Tylenol and Norco, modalities prn in therapy, Lidoderm, K-pad prn. Skin healing and breakdown risk:  continue pressure relief program.  Daily skin exams and reports from nursing. Severe fatigue due to nutritional and hydration deficiency: Add vitamin B12 vitamin D and CoQ10 continue to monitor I&Os, calorie counts prn, dietary consult prn. Add healthy HS snack. Acute episodic insomnia with situational adjustment disorder:  consider prn low dose Ambien, monitor for day time sedation. Add HS \"Tuck In\"  Falls risk elevated:  patient to use call light to get nursing assistance to get up, bed and chair alarm. Elevated DVT risk: progressive activities in PT, continue prophylaxis PORTILLO hose, elevation and Xarelto. Complex discharge planning: Plan for discharge Friday, August 19, 2022 Skilled then eventually to home alone with support from her son who lives close by and her daughter-in-law. We will also have home health care come out PT OT RN aide and push possibly a . Progress toward her final weekly team meeting    Monday to re-assess progress towards goals, discuss and address social, psychological and medical comorbidities and to address difficulties they may be having progressing in therapy. Patient and family education is in progress. The patient is to follow-up with their family physician after discharge. Complex Active General Medical Issues that complicate care addressed during rehab stay:     1. 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 Continue blood signs every shift focusing on heart rate, rhythm and blood pressure checks with orthostatic checks-monitoring the effect of exercise, therapy and posture. Consult hospitalist for backup medical and adjust/add medications (Xarelto, Coreg). Monitor heart rate and blood pressure as well as medications effects on vital signs before during and after therapy with especial focus on preventing orthostasis and falls risk. Patient was discharged to medical floor with probable [facility] transfer. See above    Hyponatremia-recheck BMP avoid excessive water  Anemia-Monitor vital signs monitor for orthostasis and tachycardia, check H&H prn. Vitamin B12 and iron-dose iron with food to prevent GI upset. Monitor for constipation. Monitor stools for blood. Hypothyroid-  Synthroid and titrate dosing. Follow vital signs, recheck TSH and thyroid studies as outpatient.     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

## 2022-08-15 NOTE — PROGRESS NOTES
OCCUPATIONAL THERAPY  INPATIENT REHAB TREATMENT NOTE  [facility]      NAME: Roby Aburto  : 1932 (80 y.o.)  MRN: [id number]  CODE STATUS: DNR-CC  Room: R250/R250-01    Date of Service: 8/15/2022    Referring Physician: Dr. Vriy Zuniga Diagnosis: Impaired mobility and ADL's due to Charcot Berna Malady Tooth Neuropathy    Restrictions  Restrictions/Precautions  Restrictions/Precautions: Fall Risk  Required Braces or Orthoses?: No     Patient's date of birth confirmed: Yes    SAFETY:  Safety Devices  Safety Devices in place: Yes  Type of devices: All fall risk precautions in place    SUBJECTIVE:  Subjective: \" Dizzy. Dizzy. No stop. Too strong. \"     Pain at start of treatment:  No 0/10    Pain at end of treatment:   No 0/10    COGNITION:  Orientation  Overall Orientation Status: Within Functional Limits  Cognition  Overall Cognitive Status: WFL  Cognition Comment: Comp Mod I, expression Mod I, social Ind, problem Sup, Memory Sup    OBJECTIVE:    Transfer:  Patient completed supine -> sit at MOD I. Patient completed sit -> stand at SBA. Grooming/Oral Hygiene  Assistance Level: Independent; Increased time to complete  Putting On/Taking Off Footwear  Assistance Level: Dependent  Skilled Clinical Factors: B socks/AFO's/shoes  Toileting  Assistance Level: Supervision; Increased time to complete  Skilled Clinical Factors: Supervision for clothing management in standing 2° c/o dizziness  Toilet Transfers  Technique: Stand step  Equipment: Grab bars;Standard toilet  Assistance Level: Stand by assist;Increased time to complete  Skilled Clinical Factors: ww    FM Coordination:  Patient engaged in B FM coordination to increase I with fasteners and small objects for ADL's and IADL's. Patient able to  pieces from table top with MIN difficulty. Patient able to place bolt in L hand and stabilize with 0 difficulty.   Patient able to place circular dowel on bolt with R hand with 0 difficulty. Patient unable to thread nut. Therapist changed activity. FM Coordination:  Patient engaged in B FM coordination and strengthening to increase I with fasteners and small objects for ADL's and IADL's. Patient able to assemble large conduit and connector pieces with MOD difficulty with initial threading. Patient with MOD difficulty identifying correct matching pieces. ASSESSMENT: Patient became teary twice during session 2º constant dizziness. Activity Tolerance: Treatment limited secondary to medical complications      PLAN OF CARE:  Strengthening, Balance training, Functional mobility training, Neuromuscular re-education, Endurance training, Self-Care / ADL, Cognitive/Perceptual training, Safety education & training, Home management training, Patient/Caregiver education & training    Continue per OT POC for planned d/c on 22    Patient goals : \"to be able to wash my back and my feet. \"  Time Frame for Long term goals : 2 weeks  Long Term Goal 1: Pt within two weeks of evaluation will demonstrate progress to goals to address areas as stated below to increase functional independence for return to PLOF  Long Term Goal 2: Pt will increase independence with ADL Pt will increase independence with ADL transfers  Long Term Goal 3: Pt will increase bilateral UE strength to increase ease of ADL transfers        Therapy Time:   Individual Group Co-Treat   Time In 1000       Time Out 1100         Minutes 60                   ADL/IADL trainin minutes  Therapeutic activities: 20 minutes     Electronically signed by:    PRUDENCE Valle,   8/15/2022, 11:31 AM

## 2022-08-15 NOTE — PROGRESS NOTES
Occupational Therapy Get up and Go Note            Date: 8/15/2022  Patient Name: Carina Simmons        MRN: [id number]    Account #: [de-identified]  : 1932  (80 y.o.)      Subjective:  Patient states:  Dizzy, Dizzy  Pain:  Pain at start of treatment: No    Pain at end of treatment: No          Objective:  ADL:  Toileting:  Lorri due to clothing management  Toilet transfers:  Sup    Pt completed bed mobility Sup, SPT EOB to WC Sup level. Pt left on Toilet with PCA in room. Treatment consisted of:   [x] ADL Training  [] Strengthening   [] Transfer Training    [] DME Education  [] HEP   [] Patient Education  [] Other:    Safety:  Safety Devices  Safety Devices in place:   Type of devices:  All fall risk precautions in place      Therapy Time:   Individual Group Co-Treat   Time In 0749       Time Out 0758         Minutes 9             ADL/IADL trainin minutes         Electronically signed by:    Zabrina Prieto OT    8/15/2022, 8:15 AM

## 2022-08-16 PROBLEM — N94.89 ADNEXAL MASS: Status: ACTIVE | Noted: 2022-01-01

## 2022-08-16 LAB
INR BLD: 2.8
PROTHROMBIN TIME: 28.5 SEC (ref 12.3–14.9)

## 2022-08-16 PROCEDURE — 6370000000 HC RX 637 (ALT 250 FOR IP): Performed by: INTERNAL MEDICINE

## 2022-08-16 PROCEDURE — 99232 SBSQ HOSP IP/OBS MODERATE 35: CPT | Performed by: INTERNAL MEDICINE

## 2022-08-16 PROCEDURE — 6370000000 HC RX 637 (ALT 250 FOR IP): Performed by: PHYSICAL MEDICINE & REHABILITATION

## 2022-08-16 PROCEDURE — 97116 GAIT TRAINING THERAPY: CPT

## 2022-08-16 PROCEDURE — 6360000002 HC RX W HCPCS: Performed by: INTERNAL MEDICINE

## 2022-08-16 PROCEDURE — 97535 SELF CARE MNGMENT TRAINING: CPT

## 2022-08-16 PROCEDURE — 2580000003 HC RX 258: Performed by: INTERNAL MEDICINE

## 2022-08-16 PROCEDURE — 97530 THERAPEUTIC ACTIVITIES: CPT

## 2022-08-16 PROCEDURE — 87075 CULTR BACTERIA EXCEPT BLOOD: CPT

## 2022-08-16 PROCEDURE — 87070 CULTURE OTHR SPECIMN AEROBIC: CPT

## 2022-08-16 PROCEDURE — 85610 PROTHROMBIN TIME: CPT

## 2022-08-16 PROCEDURE — 1180000000 HC REHAB R&B

## 2022-08-16 PROCEDURE — 2700000000 HC OXYGEN THERAPY PER DAY

## 2022-08-16 PROCEDURE — 99232 SBSQ HOSP IP/OBS MODERATE 35: CPT | Performed by: PHYSICAL MEDICINE & REHABILITATION

## 2022-08-16 PROCEDURE — 36415 COLL VENOUS BLD VENIPUNCTURE: CPT

## 2022-08-16 PROCEDURE — 97110 THERAPEUTIC EXERCISES: CPT

## 2022-08-16 RX ADMIN — PANTOPRAZOLE SODIUM 40 MG: 40 TABLET, DELAYED RELEASE ORAL at 06:14

## 2022-08-16 RX ADMIN — LACTOBACILLUS TAB 2 TABLET: TAB at 21:27

## 2022-08-16 RX ADMIN — LEVOTHYROXINE SODIUM 88 MCG: 88 TABLET ORAL at 08:17

## 2022-08-16 RX ADMIN — Medication 10 ML: at 21:28

## 2022-08-16 RX ADMIN — MEROPENEM 1000 MG: 1 INJECTION, POWDER, FOR SOLUTION INTRAVENOUS at 05:08

## 2022-08-16 RX ADMIN — MEROPENEM 1000 MG: 1 INJECTION, POWDER, FOR SOLUTION INTRAVENOUS at 13:12

## 2022-08-16 RX ADMIN — Medication 2000 UNITS: at 17:44

## 2022-08-16 RX ADMIN — LACTOBACILLUS TAB 2 TABLET: TAB at 08:17

## 2022-08-16 RX ADMIN — FUROSEMIDE 20 MG: 20 TABLET ORAL at 08:17

## 2022-08-16 RX ADMIN — CARVEDILOL 6.25 MG: 6.25 TABLET, FILM COATED ORAL at 08:17

## 2022-08-16 RX ADMIN — MECLIZINE 12.5 MG: 12.5 TABLET ORAL at 06:12

## 2022-08-16 RX ADMIN — Medication 100 MG: at 17:44

## 2022-08-16 RX ADMIN — PIPERACILLIN AND TAZOBACTAM 3375 MG: 3; .375 INJECTION, POWDER, FOR SOLUTION INTRAVENOUS at 21:31

## 2022-08-16 RX ADMIN — CARVEDILOL 6.25 MG: 6.25 TABLET, FILM COATED ORAL at 17:44

## 2022-08-16 RX ADMIN — MICONAZOLE NITRATE: 2 POWDER TOPICAL at 21:27

## 2022-08-16 RX ADMIN — LACTOBACILLUS TAB 2 TABLET: TAB at 13:09

## 2022-08-16 RX ADMIN — FERROUS SULFATE TAB 325 MG (65 MG ELEMENTAL FE) 325 MG: 325 (65 FE) TAB at 17:44

## 2022-08-16 RX ADMIN — MICONAZOLE NITRATE: 2 POWDER TOPICAL at 08:23

## 2022-08-16 RX ADMIN — CITALOPRAM HYDROBROMIDE 20 MG: 10 TABLET ORAL at 08:17

## 2022-08-16 RX ADMIN — Medication 10 ML: at 08:25

## 2022-08-16 RX ADMIN — CLOTRIMAZOLE: 1 CREAM TOPICAL at 21:32

## 2022-08-16 RX ADMIN — CLOTRIMAZOLE: 1 CREAM TOPICAL at 08:24

## 2022-08-16 ASSESSMENT — ENCOUNTER SYMPTOMS
COUGH: 0
BLOOD IN STOOL: 0
EYES NEGATIVE: 1
BACK PAIN: 1
CHEST TIGHTNESS: 0
ABDOMINAL PAIN: 0
NAUSEA: 0
STRIDOR: 0
WHEEZING: 0
GASTROINTESTINAL NEGATIVE: 1
RESPIRATORY NEGATIVE: 1
SHORTNESS OF BREATH: 0

## 2022-08-16 ASSESSMENT — PAIN SCALES - GENERAL: PAINLEVEL_OUTOF10: 0

## 2022-08-16 NOTE — CARE COORDINATION
LSW received voicemail from pt's son stating that they feel SNF placement would be best at this time. Pt is in agreement and does not feel that she can go home (she reported this to them). Pt and family would like placement at one of three facilities, in order of preference, 1. [facility] 2. [facility] or 3. [facility]. LSW called in referral to [address] [place] [address] at [facility] and provided info, [address] [place] [address] to call LSW back.  Electronically signed by KIN Luna, JACKLYN on 8/16/2022 at 8:53 AM

## 2022-08-16 NOTE — PROGRESS NOTES
OCCUPATIONAL THERAPY  INPATIENT REHAB TREATMENT NOTE  [facility]      NAME: Mert Coleman  : 1932 (80 y.o.)  MRN: [id number]  CODE STATUS: DNR-CC  Room: R250/R250-01    Date of Service: 2022    Referring Physician: Dr. Katarina Gross Diagnosis: Impaired mobility and ADL's due to Charcot Albertine Smoker Tooth Neuropathy    Restrictions  Restrictions/Precautions  Restrictions/Precautions: Fall Risk  Required Braces or Orthoses?: No     Patient's date of birth confirmed: Yes    SAFETY:  Safety Devices  Safety Devices in place: Yes  Type of devices: All fall risk precautions in place    SUBJECTIVE:  Subjective: \" Oh, hi girl. \"     Pain at start of treatment:  No 0/10    Pain at end of treatment:   No 0/10    COGNITION:  Orientation  Overall Orientation Status: Within Functional Limits  Cognition  Overall Cognitive Status: WFL  Cognition Comment: Comp Mod I, expression Mod I, social Ind, problem Sup, Memory Sup    OBJECTIVE:    Feeding  Assistance Level: Independent  Grooming/Oral Hygiene  Assistance Level: Independent  Upper Extremity Bathing  Assistance Level: Supervision  Skilled Clinical Factors: Setup to adjust water temperature and hang up and remove hand held shower from galavzi. Lower Extremity Bathing  Assistance Level: Stand by assist  Upper Extremity Dressing  Assistance Level: Set-up  Lower Extremity Dressing  Assistance Level: Minimal assistance  Skilled Clinical Factors: thread R LE x 1/2  Putting On/Taking Off Footwear  Assistance Level: Dependent  Skilled Clinical Factors: B socks  Toileting  Assistance Level: Modified independent  Toilet Transfers  Technique: Stand step  Equipment: Grab bars;Standard toilet  Assistance Level: Stand by assist  Skilled Clinical Factors: ww  Tub/Shower Transfers  Type: Shower  Transfer From: Walker  Transfer To:  Shower chair with back  Assistance Level: Contact guard assist  Skilled Clinical Factors: CGA 2° 3 c/o dizziness with 1 LOB    ASSESSMENT: Patient pleasant throughout session. Activity Tolerance: Patient tolerated treatment well      PLAN OF CARE:  Strengthening, Balance training, Functional mobility training, Neuromuscular re-education, Endurance training, Self-Care / ADL, Cognitive/Perceptual training, Safety education & training, Home management training, Patient/Caregiver education & training    Continue per OT POC for planned d/c on 22    Patient goals : \"to be able to wash my back and my feet. \"  Time Frame for Long term goals : 2 weeks  Long Term Goal 1: Pt within two weeks of evaluation will demonstrate progress to goals to address areas as stated below to increase functional independence for return to PLOF  Long Term Goal 2: Pt will increase independence with ADL Pt will increase independence with ADL transfers  Long Term Goal 3: Pt will increase bilateral UE strength to increase ease of ADL transfers        Therapy Time:   Individual Group Co-Treat   Time In 930       Time Out 1030         Minutes 60                   ADL/IADL trainin minutes     Electronically signed by:    PRUDENCE Parra   2022, 11:37 AM

## 2022-08-16 NOTE — PROGRESS NOTES
Physical Therapy Rehab Treatment Note  [facility]/Department[facility]  [facility]       NAME: Elmo Cade  : 1932 (80 y.o.)  MRN: [id number]  CODE STATUS: DNR-CC    Date of Service: 2022       Restrictions:  Restrictions/Precautions: Fall Risk       SUBJECTIVE:   Subjective: \"Every morning dizzy dizzy dizzy. \"  Pt denies dizziness this PM.    Pain  Pain: Pt denies pain        OBJECTIVE:         Bed mobility  Rolling to Left: Modified independent  Rolling to Right: Modified independent  Supine to Sit: Modified independent  Sit to Supine: Modified independent    Transfers  Sit to Stand: Modified independent  Stand to sit: Modified independent  Bed to Chair: Modified independent    Ambulation  WB Status: wbat  Ambulation  Surface: level tile;carpet  Device: Rolling Walker  Other Apparatus: O2  Assistance: Supervision  Gait Deviations: Slow Lea;Decreased step length  Distance: 50ftx2  Comments: Pt ambulated to bathroom without LOB or c/o dizziness. Stairs/Curb  Stairs?: Yes  Stairs  # Steps : 4  Stairs Height: 6\"  Rails: Bilateral  Assistance: Stand by assistance  Comment: Fatigued after stairs. PT Exercises  A/AROM Exercises:seated: LAQ x20, march x20, hip add with ball x20 hip abd RTB x20        ASSESSMENT/PROGRESS TOWARDS GOALS:   Assessment: Pt without c/o dizziness symptoms throughout treatment. Improved TUG score from 44sec to 29sec. Has met goals.     Goals:  Long Term Goals  Long term goal 1: Pt to complete bed mobility with indep-met  Long term goal 2: Pt to complete transfers with supervision-met  Long term goal 3: Pt to ambulate 50ft with LRD and supervision-met  Long term goal 4: Pt to manage 2 steps with B HR and Supervision-SBA d/t fatigue and safety  Long term goal 5: Pt to complete TUG within 40sec to demonstrate improved functional mobility/balance-met 29sec on   Patient Goals   Patient goals : does not state, family wishes pt to regain her indep    PLAN OF CARE/Safety:   Plan Comment: Cont per POC.     Therapy Time:   Individual   Time In 1500   Time Out 1530   Minutes 30     Minutes:  Transfer/Bed mobility training:10  Gait training:10  Neuro re education:0  Therapeutic ex:10      Preethi Pruett PTA, 08/16/22 at 4:32 PM

## 2022-08-16 NOTE — PROGRESS NOTES
INR currently 2.8, unable to place PICC at this time. Consent signed with son at bedside and educated on use of PICC. Seen by ID, antibiotics changed to IV Zosyn every 8 hours. Would like aerobic/anaerobic cx sent from accordion drain. Spoke with micro and blue top tube will be sent up. Already emptied drain today for 35cc serous drainage. Left message with specials regarding a new dressing to  be placed, current one appears to be peeling off. Electronically signed by Kunal Chavez RN on 8/16/22 at 2:50 PM EDT     Mary Keene CNP to apply new accordian dressing tomorrow. Orders to apply \"comfort \" dressing until seen. ABD applied. Electronically signed by Kunal Chavez RN on 8/16/22 at 5:56 PM EDT

## 2022-08-16 NOTE — PROGRESS NOTES
Subjective: The patient complains of  moderate to severe acute    Right sided abdominal pain partially relieved by rest, PT, OT, drain and exacerbated by recent illness and exertion. I had been concerned about patients medical complexities and current active problems and barriers to progress including - recently presented with an abdominal mass felt to be either cancer or an abscess. Imaging was thinking it was more like an abscess. She was admitted to [facility] placed on antibiotics and a drain was placed under interventional radiology's guidance. I discussed current functional, rehabilitation, medical status with other rehabilitation providers including nursing and case management. According to recent nursing note, \" A&O x4. Denies pain at this time. Emptied RUQ drain for 20 ml of serosanguinous drainage. In bed with alarm activated. Call light in reach. Pt c/o dizziness. Gave PRN Antivert PO. Shelli served Dr Luke Vasquez about placing PICC for antibiotics. Also shelli served Dr Jill Hubbard regarding if Coumadin can be held for placement of PICC. Dr Jill Hubbard responded Coumadin can be on hold for 3 days starting tonight. Will need to bridge with Lovenox once INR drops. Dr Jill Hubbard said he would take care of Lovenox. INR is 3.2 today. Notified Dr Luke Vasquez of Dr Jill Hubbard saying it was ok to hold Coumadin for PICC. Dr Luke Vasquez said ok for PICC placement. Notified Clearance Shade RN, Rebekah REAL Case Manager and Dr Petra Zimmerman. \"      I am concerned about her occasional diarrhea and dizziness. I have added lactobacillus and Zofran. Still is having some abdominal pain at the drain site. She is very tough and does not like to take any medications unless is necessary. I had a long discussion with her and her son and we do feel that is necessary to improve her quality of life and ease her suffering. She will take scheduled Tylenol 3 a day and I will add as needed Norco if things get bad.   We are monitoring closely for recurrence of C. difficile colitis. Recent stat labs were unremarkable--he needs a PICC line and we are waiting for her INR to come down below 2, we are switching her over from Coumadin to bridging Lovenox when she has a PICC line and then we are hoping for discharge to skilled. ROS x10: The patient also complains of severely impaired mobility and activities of daily living. Otherwise no new problems with vision, hearing, nose, mouth, throat, dermal, cardiovascular, GI, , pulmonary, musculoskeletal, psychiatric or neurological. See Rehab H&P on Rehab chart dated . Vital signs:  /85   Pulse 99   Temp 98.7 °F (37.1 °C) (Oral)   Resp 16   Ht 5' (1.524 m)   Wt 159 lb 3.2 oz (72.2 kg)   SpO2 95%   BMI 31.09 kg/m²   I/O:   PO/Intake:  fair PO intake, no problems observed or reported. Bowel/Bladder:  continent,  immodium for diarrhea and urinary urgency. General:  Patient is well developed, adequately nourished, non-obese and     well kempt. HEENT:    PERRLA, hearing intact to loud voice, external inspection of ear     and nose benign. Inspection of lips, tongue and gums    Musculoskeletal: No significant change in strength or tone. All joints stable. Inspection and palpation of digits and nails show no clubbing,       cyanosis or inflammatory conditions. Neuro/Psychiatric: Affect: flat. Alert and oriented to person, place and     situation. No significant change in deep tendon reflexes or     Sensation    Lungs:  Diminished, CTA-B. Respiration effort is normal at rest.     Heart:   S1 = S2, RRR. No loud murmurs. Abdomen:  Soft, non-tender, no enlargement of liver or spleen-75cc of bloody drainage in drainage bag-right abdomen- tenderness at the drain site  Extremities:  Trace lower extremity edema,    tenderness. Skin:   Intact to general survey, no visualized or palpated problems  right abd drain-scant drainage.     Rehabilitation:  Physical therapy: FIMS:  Bed Mobility:      Transfers: Sit to Stand: Modified independent  Stand to sit: Modified independent  Bed to Chair: Modified independent, WB Status: wbat  Ambulation  Surface: level tile, carpet  Device: Rolling Walker  Other Apparatus: O2  Assistance: Supervision  Quality of Gait: Assist for management of O2 line. Cues for awareness to line with pt attempting to manage with fair follow through. Gait Deviations: Slow Lea, Decreased step length  Distance: 60ftx2  Comments: NT today d/t reports of increased dizziness with standing., Stairs  # Steps : 4  Stairs Height: 6\"  Rails: Bilateral  Assistance: Stand by assistance  Comment: Pt attempted to adjust mask while descending stairs becoming unsteady requiring CGA with 1 UE support. FIMS:  ,  ,      Occupational therapy: FIMS:   ,  , Assessment: Pt continues to benefit from OT services to increase independence, safety and balance when performing self care ADLs. Speech therapy: FIMS:        Lab/X-ray studies reviewed, analyzed and discussed with patient and staff:   Recent Results (from the past 24 hour(s))   CBC    Collection Time: 08/15/22  2:00 PM   Result Value Ref Range    WBC 7.0 4.8 - 10.8 K/uL    RBC 4.25 4.20 - 5.40 M/uL    Hemoglobin 11.6 (L) 12.0 - 16.0 g/dL    Hematocrit 35.5 (L) 37.0 - 47.0 %    MCV 83.5 82.0 - 100.0 fL    MCH 27.3 27.0 - 31.3 pg    MCHC 32.7 (L) 33.0 - 37.0 %    RDW 16.1 (H) 11.5 - 14.5 %    Platelets 519 472 - 500 K/uL   Basic Metabolic Panel    Collection Time: 08/15/22  2:00 PM   Result Value Ref Range    Sodium 139 135 - 144 mEq/L    Potassium 4.2 3.4 - 4.9 mEq/L    Chloride 102 95 - 107 mEq/L    CO2 28 20 - 31 mEq/L    Anion Gap 9 9 - 15 mEq/L    Glucose 115 (H) 70 - 99 mg/dL    BUN 15 8 - 23 mg/dL    Creatinine 0.63 0.50 - 0.90 mg/dL    GFR Non-African American >60.0 >60    GFR  >60.0 >60    Calcium 8.5 8.5 - 9.9 mg/dL         Previous extensive, complex labs, notes and diagnostics reviewed and analyzed.      ALLERGIES:    Allergies as of 08/03/2022 - Fully Reviewed 08/03/2022   Allergen Reaction Noted    Latex  07/19/2017    Tape [adhesive tape]  06/28/2016      (please also verify by checking MAR)     Recently, I evaluated this patient for periodic reassessment of medical and functional status. The patient was discussed in detail at the treatment team meeting focusing on current medical issues, progress in therapies, social issues, psychological issues, barriers to progress and strategies to address these barriers, and discharge planning. See the hand written addendum to rehab progress note. The patient continues to be high risk for future disability and their medical and rehabilitation prognosis continue to be good and therefore, we will continue the patient's rehabilitation course as planned. The patient's tentative discharge date was set. Patient and family education was discussed. The patient was made aware of the team discussion regarding their progress. Discharge plans were discussed along with barriers to progress and strategies to address these barriers, patient encouraged to continue to discuss discharge plans with . Complex Physical Medicine & Rehab Issues addressed during rehab stay:   Severe abnormality of gait and mobility and impaired self-care and ADL's secondary to progressive weakness dt   CMT neuropathy . Functional and medical status deteriorated and patient needed to be transferred off the floor see details above. Bowel with occasional diarrhea on antibiotics, and Bladder dysfunction monitoring neurogenic bladder:  frequent toileting, ambulate to bathroom with assistance, check post void residuals. Check for C.difficile x1 if >2 loose stools in 24 hours, continue bowel & bladder program.  Monitor bowel and bladder function. Lactinex 2 PO every AC. MOM prn, Brown Bomb prn, Glycerin suppository prn, enema prn.   Severe abdominal  pain as well as generalized OA pain,   Lumbar stenosis with neurogenic claudication: reassess pain every shift and prior to and after each therapy session, give Tylenol and prn Tylenol and Norco, modalities prn in therapy, Lidoderm, K-pad prn. Skin healing and breakdown risk:  continue pressure relief program.  Daily skin exams and reports from nursing. Severe fatigue due to nutritional and hydration deficiency: Add vitamin B12 vitamin D and CoQ10 continue to monitor I&Os, calorie counts prn, dietary consult prn. Add healthy HS snack. Acute episodic insomnia with situational adjustment disorder:  consider prn low dose Ambien, monitor for day time sedation. Add HS \"Tuck In\"  Falls risk elevated:  patient to use call light to get nursing assistance to get up, bed and chair alarm. Elevated DVT risk: progressive activities in PT, continue prophylaxis PORTILLO hose, elevation and Coumadin on hold pending a PICC line. Complex discharge planning: Plan for discharge Friday, August 19, 2022 Skilled then eventually to home alone with support from her son who lives close by and her daughter-in-law. We will also have home health care come out PT OT RN aide and push possibly a . Progress toward her final weekly team meeting    Monday to re-assess progress towards goals, discuss and address social, psychological and medical comorbidities and to address difficulties they may be having progressing in therapy. Patient and family education is in progress. The patient is to follow-up with their family physician after discharge. Complex Active General Medical Issues that complicate care addressed during rehab stay:     1.  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 Continue blood signs every shift focusing on heart rate, rhythm and blood pressure checks with orthostatic checks-monitoring the effect of exercise, therapy and posture. Consult hospitalist for backup medical and adjust/add medications (Coumadin, Coreg). Monitor heart rate and blood pressure as well as medications effects on vital signs before during and after therapy with especial focus on preventing orthostasis and falls risk. Patient was discharged to medical floor with probable [facility] transfer. See above    Hyponatremia-recheck BMP avoid excessive water  Anemia-Monitor vital signs monitor for orthostasis and tachycardia, check H&H prn. Vitamin B12 and iron-dose iron with food to prevent GI upset. Monitor for constipation. Monitor stools for blood. Hypothyroid-  Synthroid and titrate dosing. Follow vital signs, recheck TSH and thyroid studies as outpatient. 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 Osteoarthritis of lumbar spine with myelopathy-her extremity strengthening    Depression-emotional support provided daily, vitamin B12, encourage participation in rehabilitation support group and recreational therapy, adjust/add medications (B12, Celexa)  Abdominal mass abscess versus cancer-drain still in place pending cytology-patient is on IV Zosyn pending cultures. Closely monitor drainage from her drain site. GERD-Elevate head of bed after meals, monitor stools for blood, lowest effective dose of PPI, consider Tums.   Yeast rash and immunosuppression-Micatin powder and Floranex  Long-term coumadinization  Long-term antibiotics with abdominal drain      Focus on coordinating dc plans with patient family and care givers and order necessary equipment. Pal Garzon D.O., PM&R     Attending    [address] [facility]   Focus on endurance, activity pacing, reassessing rehab goals and discharge planning.

## 2022-08-16 NOTE — PROGRESS NOTES
Warfarin Dosing - Pharmacy Consult Note  Consulting Provider: Balbir Rhodes  Indication: Thrombus  Warfarin Dose prior to admission: new start   Concurrent anticoagulants/antiplatelets: none  Significant Drug Interactions: No obvious interactions  Recent Labs     08/14/22  0509 08/15/22  0446 08/15/22  1400 08/16/22  0554   INR 2.9 3.2  --  2.8   HGB  --   --  11.6*  --    PLT  --   --  237  --      Recent warfarin administrations                     warfarin (COUMADIN) tablet 5 mg (mg) 5 mg Given 08/14/22 1725    warfarin (COUMADIN) tablet 7.5 mg (mg) 7.5 mg Given 08/13/22 1851                   Date       INR    Dose  08/16/22    2.8               HOLD (per Dr. Jayant Dumont)    Assessment/Plan  (Goal INR: 2 - 3)  HOLD per Dr. Madden Backer until INR < 2.0 then PICC per Specials team. Pt will need bridged with Lovenox until INR is therapeutic after procedure. Active problem list reviewed. INR orders are placed. Chart reviewed for pertinent labs, drug/diet interactions, and past doses. Documentation of patient's clinical condition was reviewed. Pharmacy Dosing:  Pharmacy will continue to follow. DAISY Devine. Ph.  8/16/2022  9:54 AM

## 2022-08-16 NOTE — PROGRESS NOTES
Physical Therapy Rehab Treatment Note  Facility/Department: [facility]  [facility]       NAME: Ebony Fabry  : 1932 (80 y.o.)  MRN: [id number]  CODE STATUS: [id number]-CC    Date of Service: 2022       Restrictions:  Restrictions/Precautions: Fall Risk       SUBJECTIVE:   Subjective: \"shower three times dizzy\"    Pain  Pain: Pt denies pain      OBJECTIVE:         Bed mobility  Rolling to Left: Modified independent  Rolling to Right: Modified independent  Supine to Sit: Modified independent  Sit to Supine: Modified independent    Transfers  Sit to Stand: Modified independent  Stand to sit: Modified independent  Bed to Chair: Modified independent    Ambulation  WB Status: wbat  Ambulation  Surface: level tile  Device: Rolling Walker  Assistance: Supervision  Gait Deviations: Slow Lea;Decreased step length  Distance: 20ft distance limited by bathroom destination. Comments: Pt ambulated to bathroom without LOB or c/o dizziness. Not tested further d/t c/o dizziness with standing after toileting. Pt transferred to [facility]. Balance  Comments: Pt utilized reacher to  objects off floor from standing with supervision. PT Exercises  A/AROM Exercises: supine: SLR x10, heel slides x20, hip abd x20, SAQ x20, bridges x10, LTR x10, hooklying hip add with ball x20; seated: LAQ x20, march x20  Dynamic Standing Balance Exercises: Standing with 1 UE support to manage pants in bathroom. Increased dizziness noted needing to sit. Supervision         Education Provided: Equipment; Mobility Training; Safety  Education  Education Given To: Patient  Education Provided: Equipment; Mobility Training; Safety  Education Provided Comments: [employer] for safest ambulation. Family training done on . No family present for training since.   Education Method: Demonstration;Verbal  Barriers to Learning: Other (Comment) (language barrier)  Education Outcome: Demonstrated understanding;Continued education needed ASSESSMENT/PROGRESS TOWARDS GOALS:   Assessment: Ambulation and standing activity limited by c/o dizziness. Pt tolerated transfers and seated/sup activity. Has met bed mob and transfer goals. Goals:  Long Term Goals  Long term goal 1: Pt to complete bed mobility with indep  Long term goal 2: Pt to complete transfers with supervision  Long term goal 3: Pt to ambulate 50ft with LRD and supervision  Long term goal 4: Pt to manage 2 steps with B HR and Supervision  Long term goal 5: Pt to complete TUG within 40sec to demonstrate improved functional mobility/balance  Patient Goals   Patient goals : does not state, family wishes pt to regain her indep    PLAN OF CARE/Safety:   Plan Comment: Plan to D/C tomorrow.   Assess gait, stairs, and TUG this PM.      Therapy Time:   Individual   Time In 1055   Time Out 1155   Minutes 60     Minutes:  Transfer/Bed mobility training:15  Gait training:10  Neuro re education:5  Therapeutic ex:30      Preethi Pruett PTA, 08/16/22 at 11:50 AM

## 2022-08-16 NOTE — CARE COORDINATION
[facility] accepted and will need medical clearance and a negative covid before going. The patient is still waiting for PICC line insertion.   Electronically signed by Erik Henry RN on 8/16/22 at 4:29 PM EDT

## 2022-08-16 NOTE — PROGRESS NOTES
Progress Note  Patient: Elmo Coeburn  Unit/Bed: R250/R250-01  YOB: 1932  MRN: [id number]  Acct: [de-identified]   Admitting Diagnosis: Impaired mobility and activities of daily living [Z74.09, Z78.9]  Impaired mobility and ADLs [Z74.09, Z78.9]  Admit Date:  8/3/2022  Hospital Day: 15    Chief Complaint:  CAD    Histories:  Past Medical History:   Diagnosis Date    Ascending aortic aneurysm ([address] 6/23/2017    Charcot Arleen Tooth muscular atrophy     CHF (congestive heart failure) ([facility])     Chronic diastolic CHF (congestive heart failure) ([address] 4/1/2022    Depression     Descending aortic aneurysm ([address] 6/23/2017    Essential hypertension 2/16/2018    Headache     HTN (hypertension)     Impaired mobility and activities of daily living     Lumbar stenosis with neurogenic claudication     Myelopathy ([address]     Osteoarthritis      Past Surgical History:   Procedure Laterality Date    JOINT REPLACEMENT Bilateral     knees    OTHER SURGICAL HISTORY Right 07/21/2022    cat scan guided abdominal mass aspiration with drain placement by Dr. Galicia [facility] Left 02/25/2021    LEFT PLEURAL CATHETER INSERTION performed by Rain Mendez MD at [place] [address] Left 01/29/2021    total of 755 cc removed per Dr Abhishek Jimenez specimen sent to lab     Family History   Problem Relation Age of Onset    Arthritis Mother     Arthritis Father     High Blood Pressure Father      Social History     Socioeconomic History    Marital status:       Spouse name: None    Number of children: 2    Years of education: None    Highest education level: None   Tobacco Use    Smoking status: Never    Smokeless tobacco: Never   Substance and Sexual Activity    Alcohol use: No     Alcohol/week: 0.0 standard drinks    Drug use: No   Social History Narrative    , Lives With: Alone, son lives down the street, dtr is in the area    Type of Home: [facility][note 3]  in [address]: One level    Home Access: Stairs to enter with rails- Number of Steps: 2- Rails: Both    Bathroom Shower/Tub: Tub/Shower unit, Bathroom Equipment: Grab bars in shower, Shower chair    Home Equipment: Rolling walker, Cane(Pt infrequemtly uses DME for ambulation and prefers to furniture walk in home)    ADL Assistance: Independent, [address]: Independent    Homemaking Responsibilities: Yes    Ambulation Assistance: Independent, Transfer Assistance: Independent    Additional Comments: Son stops over 2 times daily           Subjective/HPI  No new events overnight. No cp no sob. . wants drain tube out. Still draining serous fluid minimal. Pt requires PICC INR 2.8    EKG:        Review of Systems:   Review of Systems   Constitutional: Negative. Negative for diaphoresis and fatigue. HENT: Negative. Eyes: Negative. Respiratory: Negative. Negative for cough, chest tightness, shortness of breath, wheezing and stridor. Cardiovascular: Negative. Negative for chest pain, palpitations and leg swelling. Gastrointestinal: Negative. Negative for blood in stool and nausea. Genitourinary: Negative. Musculoskeletal:  Positive for arthralgias, back pain and gait problem. Skin: Negative. Neurological:  Positive for weakness. Negative for dizziness, syncope and light-headedness. Hematological: Negative. Psychiatric/Behavioral: Negative. Physical Examination:    /80   Pulse 98   Temp 98.7 °F (37.1 °C) (Oral)   Resp 18   Ht 5' (1.524 m)   Wt 159 lb 3.2 oz (72.2 kg)   SpO2 98%   BMI 31.09 kg/m²    Physical Exam   Constitutional: No distress. She appears chronically ill. HENT:   Normal cephalic and Atraumatic   Eyes: Pupils are equal, round, and reactive to light. Neck: Thyroid normal. No JVD present. No neck adenopathy. No thyromegaly present. Cardiovascular: Normal rate, regular rhythm, intact distal pulses and normal pulses. Murmur heard.   Pulmonary/Chest: Effort normal and breath sounds normal. She has no wheezes. She has no rales. She exhibits no tenderness. Abdominal: Soft. Bowel sounds are normal. There is no abdominal tenderness. Musculoskeletal:         General: No tenderness or edema. Normal range of motion. Cervical back: Normal range of motion and neck supple. Neurological: She is alert and oriented to person, place, and time. Skin: Skin is warm. No cyanosis. Nails show no clubbing.      LABS:  CBC:   Lab Results   Component Value Date/Time    WBC 7.0 08/15/2022 02:00 PM    RBC 4.25 08/15/2022 02:00 PM    HGB 11.6 08/15/2022 02:00 PM    HCT 35.5 08/15/2022 02:00 PM    MCV 83.5 08/15/2022 02:00 PM    MCH 27.3 08/15/2022 02:00 PM    MCHC 32.7 08/15/2022 02:00 PM    RDW 16.1 08/15/2022 02:00 PM     08/15/2022 02:00 PM     CBC with Differential:    Lab Results   Component Value Date/Time    WBC 7.0 08/15/2022 02:00 PM    RBC 4.25 08/15/2022 02:00 PM    HGB 11.6 08/15/2022 02:00 PM    HCT 35.5 08/15/2022 02:00 PM     08/15/2022 02:00 PM    MCV 83.5 08/15/2022 02:00 PM    MCH 27.3 08/15/2022 02:00 PM    MCHC 32.7 08/15/2022 02:00 PM    RDW 16.1 08/15/2022 02:00 PM    BANDSPCT 2 07/24/2022 05:00 AM    METASPCT 2 07/24/2022 05:00 AM    LYMPHOPCT 7.0 07/24/2022 05:00 AM    MONOPCT 5.8 07/24/2022 05:00 AM    BASOPCT 1.0 07/24/2022 05:00 AM    MONOSABS 0.7 07/24/2022 05:00 AM    LYMPHSABS 0.8 07/24/2022 05:00 AM    EOSABS 0.0 07/24/2022 05:00 AM    BASOSABS 0.1 07/24/2022 05:00 AM     CMP:    Lab Results   Component Value Date/Time     08/15/2022 02:00 PM    K 4.2 08/15/2022 02:00 PM    K 3.2 07/24/2022 05:00 AM     08/15/2022 02:00 PM    CO2 28 08/15/2022 02:00 PM    BUN 15 08/15/2022 02:00 PM    CREATININE 0.63 08/15/2022 02:00 PM    GFRAA >60.0 08/15/2022 02:00 PM    LABGLOM >60.0 08/15/2022 02:00 PM    GLUCOSE 115 08/15/2022 02:00 PM    PROT 6.0 07/24/2022 05:00 AM    LABALBU 3.0 08/04/2022 03:28 AM    CALCIUM 8.5 08/15/2022 02:00 PM    BILITOT 0.3 07/24/2022 05:00 AM    ALKPHOS 51 07/24/2022 05:00 AM    AST 20 07/24/2022 05:00 AM    ALT 19 07/24/2022 05:00 AM     BMP:    Lab Results   Component Value Date/Time     08/15/2022 02:00 PM    K 4.2 08/15/2022 02:00 PM    K 3.2 07/24/2022 05:00 AM     08/15/2022 02:00 PM    CO2 28 08/15/2022 02:00 PM    BUN 15 08/15/2022 02:00 PM    LABALBU 3.0 08/04/2022 03:28 AM    CREATININE 0.63 08/15/2022 02:00 PM    CALCIUM 8.5 08/15/2022 02:00 PM    GFRAA >60.0 08/15/2022 02:00 PM    LABGLOM >60.0 08/15/2022 02:00 PM    GLUCOSE 115 08/15/2022 02:00 PM     Magnesium:    Lab Results   Component Value Date/Time    MG 2.1 08/03/2022 05:18 AM     Troponin:    Lab Results   Component Value Date/Time    TROPONINI <0.010 07/18/2022 12:30 PM        Active Hospital Problems    Diagnosis Date Noted    A-fib 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 [Z74.09, Z78.9]      Priority: High    Abscess of abdominal cavity ([facility] [K65.1] 08/09/2022     Priority: Medium    Intra-abdominal abscess ([facility] [K65.1] 08/08/2022     Priority: Medium    Abdominal pain [R10.9] 07/27/2022     Priority: Medium    Lumbar stenosis with neurogenic claudication [M48.062] 06/02/2016     Priority: Medium    Chronic diastolic CHF (congestive heart failure) ([facility] [I50.32] 04/01/2022     Priority: Low    Acute on chronic combined systolic and diastolic CHF (congestive heart failure) ([address] [I50.43] 01/25/2021     Priority: Low    Descending aortic aneurysm ([facility] [I71.9] 06/23/2017     Priority: Low        Assessment/Plan:  Impression/Plan:   Abd Abscess/Mass - Drain tube in place   LVEF 60  Frequent PAF - rate is controlled on exam. On Warfarin  CAD- moderate - no angina. Continue BB  HTN Stable but elevated this am. Will observe. HPL - statin on hold  Ascending AO aneurysm-  had increased in size and measures 5.3cm distal arch.  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 Xarelto stopped and started Heparin. Had long discussion with pt and daughter. They no longer want aggressive Rx and does not want to be transferred to [facility]. Hold Warfarin until INR <2.0 then PICC per Specials team.  Pt will need bridged with Lovenox until INR is therapeutic after procedure.           Electronically signed by Patricia Staples MD on 8/16/2022 at 9:21 AM

## 2022-08-16 NOTE — PROGRESS NOTES
OCCUPATIONAL THERAPY  INPATIENT REHAB TREATMENT NOTE  [facility]      NAME: Ayla Rogel  : 1932 (80 y.o.)  MRN: [id number]  CODE STATUS: DNR-CC  Room: R250/R250-01    Date of Service: 2022    Referring Physician: Dr. Gracia Wilson Diagnosis: Impaired mobility and ADL's due to Charcot Shanta Felder Tooth Neuropathy    Restrictions  Restrictions/Precautions  Restrictions/Precautions: Fall Risk         Position Activity Restriction  Other position/activity restrictions: Abdominal drain    Patient's date of birth confirmed: Yes    SAFETY:  Safety Devices  Type of devices: All fall risk precautions in place    SUBJECTIVE:  Subjective: I am ok  Pain: no pain    OBJECTIVE:    While seated, completed fine motor task with focus on coordination, activity tolerance, and strength in order to improve general function. Facilitated graded task of patient continuously threading and managing thin string through objects/shapes of various sizes/textures. Task required precision movements and usage of digit opposition and fine pinches/grasps for item manipulation. Pt  mainly only able to complete the activity with square shaped objects due to difficulty holding onto sphere shaped objects     Completed theraputty (min resistance) exercises and tasks. Focused on manipulation of the putty which required  strength, various pinch styles, dexterity, and compound movements of the digits to alter putty according to instruction. Pt tolerated well but required frequent instruction for technique     Education:   POC    ASSESSMENT:   Pt with good participation however encouragement needed for completing of fine motor tasks.      PLAN OF CARE:  Strengthening, Balance training, Functional mobility training, Neuromuscular re-education, Endurance training, Self-Care / ADL, Cognitive/Perceptual training, Safety education & training, Home management training, Patient/Caregiver education & training  Continue per OT POC for planned d/c on 8-17-22    Patient goals : \"to be able to wash my back and my feet. \"  Time Frame for Long term goals : 2 weeks  Long Term Goal 1: Pt within two weeks of evaluation will demonstrate progress to goals to address areas as stated below to increase functional independence for return to PLOF  Long Term Goal 2: Pt will increase independence with ADL Pt will increase independence with ADL transfers  Long Term Goal 3: Pt will increase bilateral UE strength to increase ease of ADL transfers    Therapy Time:   Individual Group Co-Treat   Time In 1530       Time Out 1600         Minutes 30             Therapeutic activities: 30 minutes     Electronically signed by:    Eugene Litten, OT,   8/16/2022, 3:34 PM

## 2022-08-16 NOTE — FLOWSHEET NOTE
Spoke with Gricelda Mendez RN, aware that Shereen Conroy NP will evaluate APDL dressing on 8/17. Questions answered.

## 2022-08-16 NOTE — PROGRESS NOTES
Infectious Disease     Patient Name: Lennon Galeazzi  Date: 8/16/2022  YOB: 1932  Medical Record Number: [id number]    HPI: F/U Abdominal abscess on IV Meropenem. Severe vertigo. No pain. +ve purulent drainage from abdominal drain    Patient presented 7/22 abdominal pain CT scan showing mass abscess in right lower quadrant had drainage catheter placed at that time was initially on Zosyn and switched to Rocephin  Abscess first found on CT scan 7/18/2022 and drainage of abscess performed on 7/21/2022        CT of the Abdomen and Pelvis without intravenous contrast medium       History:  Approximate 1 week history of abdominal pain       Technical Factors:       CT imaging of the abdomen and pelvis were obtained and formatted as 5 mm contiguous axial images from the domes of the diaphragm to the symphysis pubis. Sagittal and coronal reconstructions were also obtained. Oral contrast medium: Barium sulfate, 450 mL. Intravenous contrast medium:  None. Comparison:  CT abdomen pelvis, July 18, 2022, 1444 hours, February 8, 2021. Findings:       Residual intravenous contrast medium from recent CT examination identified. Lungs:  Lung bases clear. Cardiac size enlarged, unchanged. Calcification at level of mitral valve. Small hiatal hernia. Liver:  Normal in size, shape, and attenuation. Bile Ducts:  Normal in caliber. Gallbladder:  No stones or wall thickening. Pancreas:  Normal without masses, cysts, ductal dilatation or calcification. Spleen:  Normal in size without masses or calcifications. No splenules. Kidneys:  Normal in size. No hydronephrosis, masses, or stones. Adrenals:  Normal.        Small bowel:  Normal in caliber. See \"peritoneum \". Appendix:  Not visualized. Colon:  Normal in caliber. See \"peritoneum \". Peritoneum:  No free air.  A bilobed, 10.0 x 8.0 x 10.6 cm mass having central rounded septated areas of low attenuation, 3.7 x 3.5 x 5.4 cm, and displacing cecum and ascending colon anteriorly (series 2, image 50, series 601, image 54, series 602, image    36). No calcification identified. Trace adjacent fat stranding demonstrated. Vessels: Aorta normal in course and caliber. Lymph nodes:         Retroperitoneal:  No enlarged retroperitoneal lymph nodes. Mesenteric:  No enlarged mesenteric lymph nodes. Pelvic: No enlarged pelvic lymph nodes. Ureters: Normal in course and caliber. No calcifications. Bladder: No wall thickening. Reproductive organs: 8.0 x 5.8 x 7.5 cm left adnexal cyst displacing urinary bladder right and laterally, anteriorly, and inferiorly (series 2, image 68, series 601, image 42, series 602, image 52). Abdominal Wall: Fat identified bilateral inguinal canals. Bones:  No bone lesions. Multilevel disc space narrowing lumbar spine. No post operative changes. Impression       Complex 10.0 x 8.0 x 10.6 cm mass with septated central low attenuation fluid collections as discussed. Mass displaces cecum and ascending colon anteriorly. Differential includes malignancy including appendiceal mucocele/carcinoma, as well as    retroperitoneal sarcoma with central necrotic change. Infection/abscess secondary consideration, minimal presence of adjacent inflammatory change. 8.0 x 5.8 x 7.5 cm left adnexal cysts as discussed, most likely ovarian in etiology. If clinical concern warrants, pelvic sonography may be obtained for further evaluation. Other findings discussed. 1.Successful drainage and drain placement of right colonic soft tissue mass with central fluid collection. 2.Fluid was aspirated and sent for microbiology and cytology analysis. Additionally a 10 Faroese drain was placed yielding a thick red to yellow fluid with internal yellow debris.            HISTORY: Kaitlin Vines is a Female of 80 years age. DIAGNOSIS:   Right abdominal mass with possible fluid collection vs necrotic tissue        COMPARISON: None available. CT Dose-Length Product (estimate related to radiation exposure from this exam):  541.6  mGy*cm. PROCEDURE:   Following the discussion of the procedure, alternatives, risks versus benefits, informed consent was obtained from the patient. Specifically, risks of after-biopsy pain at the site, rare possibility of excessive hemorrhage, infection, injury to the    adjacent organs were discussed and the patient verbalized understanding. Pre-procedure evaluation confirmed that the patient was an appropriate candidate for conscious    sedation. Adequate sedation was maintained during the entire procedure. Vital signs, pulse    oximetry, and response to verbal commands were monitored and recorded by the nurse throughout the    procedure and the recovery period. Medical information was entered in the medical record including the    medications and dosages used. The patient returned to baseline neurologic and physiologic status    prior to leaving the department. No immediate sedation related complications were noted. Medication    for conscious sedation was administered via IV route. 45 minutes of conscious sedation was    provided. Following universal protocol, patient and site verification was performed with a \"timeout\" prior to the procedure. The patient was placed on the CT table in supine  position and the right lateral abdomen area was prepped and draped in usual sterile fashion. Using the usual sterile conditions, lidocaine and CT guidance, the fluid collection within soft tissue density    was accessed using a Yueh needle. After confirmation of appropriate localization of the needle, aspiration yielded a thick red to yellow fluid which was sent for microbiology and cytology analysis.  Following that an Amplatz wire was placed through the Yueh sheath into the abscess under CT guidance. The tract was serially dilated with 6, 8, and 10 [place] dilators respectively. Following that a 10 Malawian drainage catheter was advanced over the wire into the abscess under CT guidance and the anchoring    loop was formed. Catheter was sutured to the skin and and secured with Percufix device. The patient tolerated the procedure well. No immediate complications identified. The patient left the CT suite in supine position to the recovery room in stable    condition. Total local anesthetic used: lidocaine, approximately 15 mL. Estimated blood loss:  None. 1.The previously placed abscess drain in the right lower quadrant appears to be in the periphery of the abscess with loculations now more apparent in the fluid collection. The fluid collection appears to be grossly the same size as prior to drainage. 2.New mural thrombus is noted in the distal aortic arch extending to the lower thoracic aorta which was not seen on prior study. Findings discussed with Dr. Christopher Camarena   3. Note is again made of an aortic aneurysm involving the ascending aorta, aortic arch, and descending aorta. Based on current images, the ascending aorta appears to be 2 mm larger when compared to study dating February 17, 2021. The ascending aorta now    measures 5.5 cm. Previously the arch measured 5.3 cm. 4.Note is again made of left lower small pleural effusion with bilateral basilar opacities which may represent atelectasis. 5.Note is again made of a large cystic mass in the pelvis.        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        CT Dose-Length Product (estimate related to radiation exposure from this exam):  1386.22  mGy*cm. CT ABDOMEN:        Bibasilar atelectasis with left small pleural effusion. Underlying nodules cannot be excluded. The heart is enlarged. The liver is of normal size and attenuation without focal lesions. The spleen is of normal size and attenuation without focal lesions. Pancreas shows no signs of focal mass or peripancreatic fluid collection. Kidneys are normal in size. There is no    hydronephrosis. No renal mass is identified. Adrenal glands are unremarkable. Note is again made of a thoracoabdominal aortic aneurysm. The ascending aorta now measures 5.5 cm, this is larger when compared to prior study dating 5.3 cm. Note is now made    of a new large mural thrombus involving the descending portion of the aortic arch and extending down to the lower thoracic aorta. This large thrombus was not seen on prior study. No significant retroperitoneal adenopathy or ascites is identified. Again    seen is a cystic lesion involving the proximal ascending aorta which now appears to be more septated than prior. The previously placed drain is now in the periphery of the lesion. CT PELVIS:    A large cystic mass is again seen in the lower pelvis. Review of Systems   Respiratory: Negative. Cardiovascular: Negative. Gastrointestinal: Negative. Negative for abdominal pain. Neurological:  Positive for dizziness (.severe vertigo Q AM) and weakness. Psychiatric/Behavioral: Negative. All other systems reviewed and are negative.        Vitals:    08/15/22 1808 08/16/22 0447 08/16/22 0735 08/16/22 0815   BP: 108/82 118/85 115/80    Pulse: 72 99 (!) 112 98   Resp: 16 16 18    Temp: 98.6 °F (37 °C) 98.7 °F (37.1 °C)     TempSrc: Oral Oral Oral    SpO2:   98%    Weight:       Height:         General Appearance: alert and oriented to person, place and time, well-developed and well-nourished, in no acute distress  Skin: warm and dry, no rash. Head: normocephalic and atraumatic  Eyes: anicteric sclerae  ENT: oropharynx clear and moist with normal mucous membranes. No oral thrush  Lungs: normal respiratory effort, clear Lungs  Heart:no murmur  Abdomen: soft, no tenderness  No leg edema  No erythema, no tenderness  Abdominal drain with minimal drainage  . Lab Results   Component Value Date    WBC 7.0 08/15/2022    HGB 11.6 (L) 08/15/2022    HCT 35.5 (L) 08/15/2022    MCV 83.5 08/15/2022     08/15/2022     Lab Results   Component Value Date/Time     08/15/2022 02:00 PM    K 4.2 08/15/2022 02:00 PM    K 3.2 07/24/2022 05:00 AM     08/15/2022 02:00 PM    CO2 28 08/15/2022 02:00 PM    BUN 15 08/15/2022 02:00 PM    CREATININE 0.63 08/15/2022 02:00 PM    GLUCOSE 115 08/15/2022 02:00 PM    CALCIUM 8.5 08/15/2022 02:00 PM          Repeat CT scan of abdomen to assess abscess  Examination: CT ABDOMEN PELVIS W IV CONTRAST       Indication:   Abdominal abscess , follow-up       Technique: Multiple serial axial images was performed through the abdomen and pelvis utilizing 50 cc of Isovue 300. Images were reconstructed in the axial and coronal and sagittal planes. Comparison: July 29, 2022 1002 hours. Findings:       Within the field-of-view of the basilar again note is made of aneurysmal dilatation of the visualized portion of descending thoracic aorta. Unchanged. There is bibasilar areas of atelectasis, scarring left greater than right. Small left pleural    effusion. The liver, spleen, pancreas, adrenals, kidneys are unremarkable. The gallbladder is contracted. Large and small bowel show no sign of obstruction. Subjacent to the proximal portion of the ascending colon/cecum is again identified a thick walled heterogeneous enhancing soft tissue collection with scattered air. A drainage catheter is seen dependently within the collection.  Collection measures    approximately 6.3 x 4.2 x 5.4. There is some peripheral enhancement and findings are that of a abscess with drainage catheter. The appendix is not visualized. .  No diverticulitis. Small to moderate hiatal hernia. Again note is made of a left cystic adnexal mass measuring 6.6 x 8.1 x 7.7 in the transverse AP and cephalocaudad dimension. Small bilateral renal hernias containing mesenteric fat       No free air. Trace amount of free fluid in the right paracolic gutter and subjacent to the ascending colon. The visualized abdominal aorta is of normal size and caliber. No significant retroperitoneal adenopathy. There is multilevel degenerative changes bridging arthritis of the visualized thoracolumbar spine. There is a grade 1 anterolisthesis of L4 and L5. Loss of height of T7-T8 again noted. Likely old compression fractures           Impression   1. THERE IS A THICK WALLED PERIPHERALLY ENHANCING complex SOFT TISSUE COLLECTION WITH AIR AS WELL AS A DRAINAGE CATHETER SUBJACENT TO THE PROXIMAL PORTION OF THE ASCENDING COLON, CECUM. LIKELY THAT OF THE ABSCESS. IT HAS SHOWN SOME INTERVAL DECREASE IN    SIZE as COMPARED TO THE PRIOR EXAMINATION. 2. LARGE CYSTIC ADNEXAL MASS. UNCHANGED. THE GALLBLADDER NEOPLASM IS NOT EXCLUDED. CONTINUED FURTHER EVALUATION    3. SMALL LEFT PLEURAL EFFUSION. OTHER FINDINGS DETAILED ABOVE     0 Result Notes    Component 7/21/22 1550    Culture Surgical  Abnormal   Direct Exam:  MANY NEUTROPHILS   Direct Exam:  FEW GRAM POSITIVE COCCI IN PAIRS   Direct Exam:  FEW GRAM POSITIVE COCCI IN CHAINS   Culture, Surgical:  No anaerobic organisms isolated at 5 days. Performed at [facility] [address]   ([phone]   P      Organism Streptococcus viridans group Abnormal  P    Culture Surgical LIGHT GROWTH   Susceptibilities have not been performed.   Notify the   laboratory within 7 days for further workup if clinically

## 2022-08-16 NOTE — DISCHARGE INSTR - COC
Continuity of Care Form    Patient Name: Yann Blanton   :  1932  MRN:  [id number]    Admit date:  8/3/2022  Discharge date:  ***    Code Status Order: DNR-CC   Advance Directives:     Admitting Physician:  Laxmi Null DO  PCP: Makenna Waggoner MD    Discharging Nurse: [facility] Unit/Room#: R250/R250-01  Discharging Unit Phone Number: ***    Emergency Contact:   Extended Emergency Contact Information  Primary Emergency Contact: [address] [facility] [address] Phone: [phone]  Relation: Child  Secondary Emergency Contact: [address] [facility]  Mobile Phone: [phone]  Relation: Child    Past Surgical History:  Past Surgical History:   Procedure Laterality Date    JOINT REPLACEMENT Bilateral     knees    OTHER SURGICAL HISTORY Right 2022    cat scan guided abdominal mass aspiration with drain placement by Dr. Kuldip Alvarado Left 2021    LEFT PLEURAL CATHETER INSERTION performed by Ayan Ambrosio MD at [place] [address] Left 2021    total of 755 cc removed per Dr Joe Reid specimen sent to lab       Immunization History: There is no immunization history on file for this patient. 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 A    Thoracic aortic aneurysm without rupture (HCC) I71.2    Descending aortic aneurysm (HCC) I71.9    Vertigo R42    Shortness of breath R06.02    Essential hypertension I10    Acute on chronic combined systolic and diastolic CHF (congestive heart failure) ([facility]) I50.43    Gait abnormality R26.9    A-fib [facility]) I48.91    Pleural effusion J90    Hypoxia R09.02    Acute pulmonary edema (HCC) J81.0    Acute on chronic combined systolic (congestive) and diastolic (congestive) heart failure ([facility]) I50.43    Acute cystitis with hematuria N30.01    Chronic diastolic CHF (congestive heart failure) ([facility]) I50.32    Right lower quadrant abdominal pain R10.31    Hyponatremia E87.1    Hypokalemia E87.6    Anemia D64.9    CHF (congestive heart failure) ([facility]) I50.9    Hypothyroid E03.9    Abdominal mass R19.00    Central retinal vein occlusion, left eye, stable H34.8122    Hx of drainage of abscess Z98.890    Abdominal pain R10.9    Streptococcus viridans infection A49.1    Aortic thrombus ([facility]) I74.10    Abscess L02.91    Intra-abdominal abscess ([facility]) K65.1    Abscess of abdominal cavity ([facility]) K65.1    Adnexal mass N94.89       Isolation/Infection:   Isolation            No Isolation          Patient Infection Status       Infection Onset Added Last Indicated Last Indicated By Review Planned Expiration Resolved Resolved By    None active    Resolved    C-diff Rule Out 07/24/22 07/24/22 07/24/22 Clostridium Difficile Toxin/Antigen (Ordered)   07/24/22 Rule-Out Test Resulted    COVID-19 (Rule Out) 02/19/21 02/19/21 02/19/21 COVID-19, Rapid (Ordered)   02/19/21 Rule-Out Test Resulted    COVID-19 (Rule Out) 02/10/21 02/10/21 02/10/21 COVID-19 (Ordered)   02/10/21 Rule-Out Test Resulted    COVID-19 (Rule Out) 02/09/21 02/09/21 02/09/21 COVID-19 (Ordered)   02/09/21 Rule-Out Test Resulted    COVID-19 (Rule Out) 02/02/21 02/02/21 02/02/21 COVID-19 (Ordered)   02/02/21 Rule-Out Test Resulted    COVID-19 (Rule Out) 01/25/21 01/25/21 01/26/21 COVID-19 (Ordered)   01/26/21 Rule-Out Test Resulted    COVID-19 (Rule Out) 01/25/21 01/25/21 01/25/21 COVID-19 (Ordered)   01/25/21 Rule-Out Test Resulted            Nurse Assessment:  Last Vital Signs: /80   Pulse 98   Temp 98.7 °F (37.1 °C) (Oral)   Resp 18   Ht 5' (1.524 m)   Wt 159 lb 3.2 oz (72.2 kg)   SpO2 98%   BMI 31.09 kg/m²     Last documented pain score (0-10 scale): Pain Level: 0  Last Weight:   Wt Readings from Last 1 Encounters:   22 159 lb 3.2 oz (72.2 kg)     Mental Status:  {IP PT MENTAL STATUS:}    IV Access:  { MALISSA IV ACCESS:055223703}    Nursing Mobility/ADLs:  Walking   {CHP DME DYUR:650243523}  Transfer  {CHP DME LASF:006043675}  Bathing  {CHP DME XQJR:645373529}  Dressing  {CHP DME SFN}  Toileting  {CHP DME XWUH:502394182}  Feeding  {CHP DME MWGI:675139425}  Med Admin  {CHP DME SNHI:430155815}  Med Delivery   { MALISSA MED Delivery:021928140}    Wound Care Documentation and Therapy:  Incision 21 Abdomen Lateral;Left;Upper (Active)   Number of days: 347        Elimination:  Continence: Bowel: {YES / YB:05978}  Bladder: {YES / WS:21783}  Urinary Catheter: {Urinary Catheter:486242779}   Colostomy/Ileostomy/Ileal Conduit: {YES / JW:08097}       Date of Last BM: ***    Intake/Output Summary (Last 24 hours) at 2022 1633  Last data filed at 2022 1314  Gross per 24 hour   Intake --   Output 35 ml   Net -35 ml     I/O last 3 completed shifts:   In: 10 [I.V.:10]  Out: 20 [Drains:20]    Safety Concerns:     [address] Safety Concerns:536453322}    Impairments/Disabilities:      [address] Impairments/Disabilities:807237171}    Nutrition Therapy:  Current Nutrition Therapy:   [address] Diet List:626363630}    Routes of Feeding: {CHP DME Other Feedings:888910301}  Liquids: {Slp liquid thickness:23503}  Daily Fluid Restriction: {CHP DME Yes amt example:104277280}  Last Modified Barium Swallow with Video (Video Swallowing Test): {Done Not Done ZZUO:505680956}    Treatments at the Time of Hospital Discharge:   Respiratory Treatments: ***  Oxygen Therapy:  {Therapy; copd oxygen:72054}  Ventilator:    {KEVIN CYR Vent DNIP:006475301}    Rehab Therapies: {THERAPEUTIC INTERVENTION:6484464629}  Weight Bearing Status/Restrictions: {KEVIN CYR Weight Bearin}  Other Medical Equipment (for information only, NOT a DME order):  {EQUIPMENT:368420688}  Other Treatments: ***    Patient's personal belongings (please select all that are sent with patient):  {CHP DME Belongings:475888132}    RN SIGNATURE:  {Esignature:297908365}    CASE MANAGEMENT/SOCIAL WORK SECTION    Inpatient Status Date: 8/3/2022    Readmission Risk Assessment Score:  Readmission Risk              Risk of Unplanned Readmission:  26           Discharging to Facility/ Agency   Name: [facility]  Address:[address] [place] [address]    Dialysis Facility (if applicable)   Name:  Address:  Dialysis Schedule:  Phone:  Fax:    / signature: Electronically signed by Howie Parker RN on 8/16/22 at 4:35 PM EDT    PHYSICIAN SECTION    Prognosis: Good    Condition at Discharge: Stable    Rehab Potential (if transferring to Rehab): Good    Recommended Labs or Other Treatments After Discharge:      Physician Certification: I certify the above information and transfer of Ralph Stubbs  is necessary for the continuing treatment of the diagnosis listed and that she requires [facility] for less 30 days.      Update Admission H&P: No change in H&P    PHYSICIAN SIGNATURE:  Torin Dial DO  Electronically signed by Howie Parker RN on 8/16/22 at 4:35 PM EDT

## 2022-08-17 PROBLEM — Z48.03 CHANGE OR REMOVAL OF DRAINS: Status: ACTIVE | Noted: 2022-01-01

## 2022-08-17 LAB
INR BLD: 2.2
PROTHROMBIN TIME: 23.9 SEC (ref 12.3–14.9)

## 2022-08-17 PROCEDURE — 99232 SBSQ HOSP IP/OBS MODERATE 35: CPT | Performed by: PHYSICAL MEDICINE & REHABILITATION

## 2022-08-17 PROCEDURE — 6370000000 HC RX 637 (ALT 250 FOR IP): Performed by: PHYSICAL MEDICINE & REHABILITATION

## 2022-08-17 PROCEDURE — 1180000000 HC REHAB R&B

## 2022-08-17 PROCEDURE — 6360000002 HC RX W HCPCS: Performed by: INTERNAL MEDICINE

## 2022-08-17 PROCEDURE — 36415 COLL VENOUS BLD VENIPUNCTURE: CPT

## 2022-08-17 PROCEDURE — 97110 THERAPEUTIC EXERCISES: CPT

## 2022-08-17 PROCEDURE — 97535 SELF CARE MNGMENT TRAINING: CPT

## 2022-08-17 PROCEDURE — 99232 SBSQ HOSP IP/OBS MODERATE 35: CPT | Performed by: NURSE PRACTITIONER

## 2022-08-17 PROCEDURE — 6370000000 HC RX 637 (ALT 250 FOR IP): Performed by: INTERNAL MEDICINE

## 2022-08-17 PROCEDURE — 6360000002 HC RX W HCPCS: Performed by: PHYSICAL MEDICINE & REHABILITATION

## 2022-08-17 PROCEDURE — 2700000000 HC OXYGEN THERAPY PER DAY

## 2022-08-17 PROCEDURE — 2580000003 HC RX 258: Performed by: INTERNAL MEDICINE

## 2022-08-17 PROCEDURE — 97530 THERAPEUTIC ACTIVITIES: CPT

## 2022-08-17 PROCEDURE — 85610 PROTHROMBIN TIME: CPT

## 2022-08-17 PROCEDURE — 99232 SBSQ HOSP IP/OBS MODERATE 35: CPT | Performed by: INTERNAL MEDICINE

## 2022-08-17 PROCEDURE — 97116 GAIT TRAINING THERAPY: CPT

## 2022-08-17 RX ORDER — MECLIZINE HCL 12.5 MG/1
12.5 TABLET ORAL
Qty: 10 TABLET | Refills: 0 | Status: SHIPPED | OUTPATIENT
Start: 2022-08-18 | End: 2022-08-28

## 2022-08-17 RX ADMIN — MECLIZINE 12.5 MG: 12.5 TABLET ORAL at 05:03

## 2022-08-17 RX ADMIN — CITALOPRAM HYDROBROMIDE 20 MG: 10 TABLET ORAL at 09:33

## 2022-08-17 RX ADMIN — CARVEDILOL 6.25 MG: 6.25 TABLET, FILM COATED ORAL at 09:33

## 2022-08-17 RX ADMIN — PIPERACILLIN AND TAZOBACTAM 3375 MG: 3; .375 INJECTION, POWDER, FOR SOLUTION INTRAVENOUS at 05:05

## 2022-08-17 RX ADMIN — Medication 2000 UNITS: at 15:27

## 2022-08-17 RX ADMIN — Medication 100 MG: at 15:28

## 2022-08-17 RX ADMIN — CYANOCOBALAMIN 1000 MCG: 1000 INJECTION, SOLUTION INTRAMUSCULAR; SUBCUTANEOUS at 09:32

## 2022-08-17 RX ADMIN — CARVEDILOL 6.25 MG: 6.25 TABLET, FILM COATED ORAL at 15:27

## 2022-08-17 RX ADMIN — Medication 10 ML: at 20:13

## 2022-08-17 RX ADMIN — PIPERACILLIN AND TAZOBACTAM 3375 MG: 3; .375 INJECTION, POWDER, FOR SOLUTION INTRAVENOUS at 23:03

## 2022-08-17 RX ADMIN — MICONAZOLE NITRATE: 2 POWDER TOPICAL at 20:12

## 2022-08-17 RX ADMIN — LACTOBACILLUS TAB 2 TABLET: TAB at 20:09

## 2022-08-17 RX ADMIN — LEVOTHYROXINE SODIUM 88 MCG: 88 TABLET ORAL at 09:33

## 2022-08-17 RX ADMIN — Medication 10 ML: at 09:37

## 2022-08-17 RX ADMIN — LACTOBACILLUS TAB 2 TABLET: TAB at 09:33

## 2022-08-17 RX ADMIN — CLOTRIMAZOLE: 1 CREAM TOPICAL at 20:12

## 2022-08-17 RX ADMIN — FUROSEMIDE 20 MG: 20 TABLET ORAL at 09:33

## 2022-08-17 RX ADMIN — PIPERACILLIN AND TAZOBACTAM 3375 MG: 3; .375 INJECTION, POWDER, FOR SOLUTION INTRAVENOUS at 15:29

## 2022-08-17 RX ADMIN — LACTOBACILLUS TAB 2 TABLET: TAB at 14:02

## 2022-08-17 RX ADMIN — MICONAZOLE NITRATE: 2 POWDER TOPICAL at 09:36

## 2022-08-17 RX ADMIN — PANTOPRAZOLE SODIUM 40 MG: 40 TABLET, DELAYED RELEASE ORAL at 05:03

## 2022-08-17 ASSESSMENT — ENCOUNTER SYMPTOMS
STRIDOR: 0
WHEEZING: 0
TROUBLE SWALLOWING: 0
BACK PAIN: 1
BACK PAIN: 0
VOMITING: 0
COUGH: 0
ABDOMINAL PAIN: 0
SORE THROAT: 0
NAUSEA: 0
RESPIRATORY NEGATIVE: 1
GASTROINTESTINAL NEGATIVE: 1
COLOR CHANGE: 0
CHEST TIGHTNESS: 0
SHORTNESS OF BREATH: 0
DIARRHEA: 0
BLOOD IN STOOL: 0
EYES NEGATIVE: 1

## 2022-08-17 NOTE — PROGRESS NOTES
PROGRESS NOTE:    Vascular Medicine and Interventional Radiology      Vascular Medicine and Interventional Radiology:    PROGRESS NOTE:       Venkata Nelson  : 1932  MR #: [id number]       PCP:  Vince Lipscomb MD     Attending Physician: Gretchen Castañeda DO     Date of Admission: 8/3/2022  1:38 PM     Chief Complaint: abdominal abscess drain       SUBJECTIVE:   Venkata Nelson sitting up in wheelchair in room eating lunch. Son is present. S/P 4 week percutaneous placement of abscess drain to right colonic soft tissue mass/fluid collection. Catheter continues to drain  tan/pink fluid of at least 20cc/day. Patient denies abdominal pain, nausea, vomiting, fever, chills. Having some morning dizziness. States she is eating well and feeling well. Son states she is to have port placed in next few days and then plan is for discharge to [facility] for RHB. Past Medical History:   has a past medical history of Ascending aortic aneurysm ([address] Charcot Arleen Tooth muscular atrophy, CHF (congestive heart failure) (HCC), Chronic diastolic CHF (congestive heart failure) ([address] Depression, Descending aortic aneurysm ([address] Essential hypertension, Headache, HTN (hypertension), Impaired mobility and activities of daily living, Lumbar stenosis with neurogenic claudication, Myelopathy ([address] and Osteoarthritis. Past SurgicalHistory:   has a past surgical history that includes joint replacement (Bilateral); thoracentesis (Left, 2021); Pleural Cath Insertion (Left, 2021); and other surgical history (Right, 2022). Allergies:Latex and Tape Shayy Arianna tape]    Home Medications:   Prior to Admission medications    Medication Sig Start Date End Date Taking?  Authorizing Provider   meclizine (ANTIVERT) 12.5 MG tablet Take 12.5 mg by mouth 4 times daily as needed    Historical Provider, MD   potassium chloride (KLOR-CON M) 20 MEQ extended release tablet Take 20 mEq by mouth daily (with breakfast)    Historical Provider, MD   digoxin (LANOXIN) 125 MCG tablet TAKE 1 TABLET DAILY 4/4/22   ADRIENNE Lyn CNP   furosemide (LASIX) 20 MG tablet TAKE 1 TABLET DAILY 4/4/22   ADRIENNE Lyn CNP   amLODIPine (NORVASC) 5 MG tablet Take 1 tablet by mouth daily 4/1/22   Jorge LAKE Holiday, DO   pantoprazole (PROTONIX) 40 MG tablet TAKE 1 TABLET DAILY 2/11/22   ADRIENNE Horvath CNP   carvedilol (COREG) 6.25 MG tablet TAKE 1 TABLET TWICE A DAY 1/17/22   ADRIENNE Colvin CNP   XARELTO 15 MG TABS tablet TAKE 1 TABLET DAILY 1/3/22   ADRIENNE Horvath CNP   nitroGLYCERIN (NITROSTAT) 0.4 MG SL tablet up to max of 3 total doses.  If no relief after 1 dose, call 911. 3/1/21   ADRIENNE Paul NP   budesonide-formoterol [facility]) 160-4.5 MCG/ACT AERO Inhale 2 puffs into the lungs 2 times daily 3/1/21   ADRIENNE Paul NP   ferrous sulfate (IRON 325) 325 (65 Fe) MG tablet Take 1 tablet by mouth 2 times daily (with meals) 3/1/21   ADRIENNE Paul NP   Carboxymethylcellulose Sodium (EYE DROPS OP) Apply 1 drop to eye as needed (Dry eyes) Indications: Systane     Historical Provider, MD   Boswellia-Glucosamine-Vit D (OSTEO BI-FLEX ONE PER DAY) TABS Take by mouth daily    Historical Provider, MD   Multiple Vitamins-Minerals (CENTRUM SILVER ULTRA WOMENS) TABS Take by mouth daily    Historical Provider, MD   vitamin B-12 (CYANOCOBALAMIN) 1000 MCG tablet Take 1,000 mcg by mouth daily    Historical Provider, MD   aspirin 81 MG tablet Take 81 mg by mouth daily     Historical Provider, MD   citalopram (CELEXA) 20 MG tablet Take 20 mg by mouth daily  4/1/16   Historical Provider, MD   SYNTHROID 88 MCG tablet Take 88 mcg by mouth daily  3/19/16   Historical Provider, MD        Family History:   Family History   Problem Relation Age of Onset    Arthritis Mother     Arthritis Father     High Blood Pressure Father       SocialHistory:    Social History     Socioeconomic History Marital status:      Spouse name: Not on file    Number of children: 2    Years of education: Not on file    Highest education level: Not on file   Occupational History    Not on file   Tobacco Use    Smoking status: Never    Smokeless tobacco: Never   Substance and Sexual Activity    Alcohol use: No     Alcohol/week: 0.0 standard drinks    Drug use: No    Sexual activity: Not on file   Other Topics Concern    Not on file   Social History Narrative    , Lives With: Alone, son lives down the street, dtr is in the area    [facility] of Home[facility]  in [address]: One level    Home Access: Stairs to enter with rails- Number of Steps: 2- Rails: Both    Bathroom Shower/Tub: Tub/Shower unit, Bathroom Equipment: Grab bars in shower, Shower chair    Home Equipment: Rolling walker, Cane(Pt infrequemtly uses DME for ambulation and prefers to furniture walk in home)    ADL Assistance: Independent, [address]: Independent    Homemaking Responsibilities: Yes    Ambulation Assistance: Independent, Transfer Assistance: Independent    Additional Comments: Son stops over 2 times daily         Social Determinants of Health     Financial Resource Strain: Not on file   Food Insecurity: Not on file   Transportation Needs: Not on file   Physical Activity: Not on file   Stress: Not on file   Social Connections: Not on file   Intimate Partner Violence: Not on file   Housing Stability: Not on file        ROS:   Review of Systems   Constitutional: Negative. Negative for chills, fatigue and fever. HENT: Negative. Negative for congestion, ear pain, sore throat and trouble swallowing. Eyes: Negative. Negative for visual disturbance. Respiratory: Negative. Negative for cough, shortness of breath and wheezing. Cardiovascular: Negative. Negative for chest pain, palpitations and leg swelling. Gastrointestinal: Negative. Negative for abdominal pain, diarrhea, nausea and vomiting. Right lower abdominal abscess drain   Endocrine: Negative. Genitourinary: Negative. Negative for difficulty urinating, dysuria and hematuria. Musculoskeletal:  Positive for gait problem (uses walker or person assist). Negative for back pain. Skin: Negative. Negative for color change, rash and wound. Neurological:  Positive for weakness (generalized but improving). Negative for dizziness, light-headedness, numbness and headaches. Hematological: Negative. Does not bruise/bleed easily. Psychiatric/Behavioral: Negative. The patient is not nervous/anxious. All other systems reviewed and are negative. Objective:   Vitals: BP (!) 146/84   Pulse 71   Temp 99.1 °F (37.3 °C) (Oral)   Resp 17   Ht 5' (1.524 m)   Wt 159 lb 3.2 oz (72.2 kg)   SpO2 91%   BMI 31.09 kg/m²      Physical Examination:      Physical Exam  Constitutional:       General: She is not in acute distress. Appearance: Normal appearance. She is not ill-appearing. HENT:      Head: Normocephalic. Nose: No congestion. Cardiovascular:      Rate and Rhythm: Normal rate. Heart sounds: Normal heart sounds. Pulmonary:      Effort: Pulmonary effort is normal.   Abdominal:      General: Bowel sounds are normal. There is no distension. Palpations: Abdomen is soft. There is no mass. Tenderness: There is no abdominal tenderness. There is no guarding. Comments: RLQ abdominal abscess drain with small amount tan/pink fluid in bag to self suction. Musculoskeletal:         General: Normal range of motion. Cervical back: Normal range of motion. Right lower leg: No edema. Left lower leg: No edema. Skin:     General: Skin is warm and dry. Neurological:      Mental Status: She is alert and oriented to person, place, and time.    Psychiatric:         Mood and Affect: Mood normal.         Behavior: Behavior normal.       ASSESSMENT:    Right pericolic abscess: S/P 4 week percutaneous placement of abscess drain with average drainage of 20 cc/day of tan/pink fluid. --  Abscess dressing removed today and new Stayfix applied. Patient tolerated well. POC discussed with ADDIE Cantu. Site clean without S/Sx infection. PLAN:     ABSCESS CATHETER ORDERS:     1. Empty drainage bag daily and PRN and record amount. Please provide patient with small measuring cup home going. 2. Keep to continuous suction by depressing the sinai (accordian). 3. Stay Fix [place] dressing will be changed every two weeks in IR office once discharged or weekly and PRN during RHB stay. Follow up appt. To be made by IR office after discharge. Otherwise, if assistance is needed with Stayfix dressing or needs changed, please notify Interventional Radiology Clinic. [phone].   4. May cover Stay Fix/site with comfort dressing with ABD or 4x4's. Change daily and PRN. 5. Please instruct patient prior to discharge how to drain, record, apply suction daily. 6. During [facility] stay, RN or MD to notify IR clinic when drainage is less than 0cc/24 Hrs. to discuss possible catheter removal if Abdominal CT shows abscess resolved. CT to be ordered by IR. Once discharged, patient and/or skilled nursing facility is to call and notify IR Clinic when drainage is less than 0cc/24 Hrs.  To discuss possible catheter removal.

## 2022-08-17 NOTE — CARE COORDINATION
JANETW spoke with John and notified him that pt's discharge will most likely not be today due to the INR still being too high for the PICC to be placed. LSW explained that if it does go low enough, then he will be notified. Mira is aware that [id number] pt, and has notified pt as well.  Electronically signed by KIN Estevez, JACKLYN on 8/17/2022 at 10:03 AM

## 2022-08-17 NOTE — PROGRESS NOTES
OCCUPATIONAL THERAPY  INPATIENT REHAB TREATMENT NOTE  [facility]      NAME: Roby Aburto  : 1932 (80 y.o.)  MRN: [id number]  CODE STATUS: DNR-CC  Room: R250/R250-01    Date of Service: 2022    Referring Physician: Dr. Viry Zuniga Diagnosis: Impaired mobility and ADL's due to Charcot Berna Malady Tooth Neuropathy    Restrictions  Restrictions/Precautions  Restrictions/Precautions: Fall Risk  Required Braces or Orthoses?: No     Patient's date of birth confirmed: Yes    SAFETY:  Safety Devices  Safety Devices in place: Yes  Type of devices: All fall risk precautions in place    SUBJECTIVE:  Subjective: \" Hi girl. \"     Pain at start of treatment:  No 0/10    Pain at end of treatment:   No 0/10    COGNITION:  Orientation  Overall Orientation Status: Within Functional Limits  Cognition  Overall Cognitive Status: WFL  Cognition Comment: Comp Mod I, expression Mod I, social Ind, problem Sup, Memory Sup    OBJECTIVE:     FM Coordination:  Patient engaged in B UE ROM/strengthening, B FM coordination and cognition to increase I with ADL's, IADL's and transfers. Patient able to reach in lateral planes with 0 difficulty. Patient able to reach in forward planes with MIN difficulty. Patient able to place 50 golf tees in wooden pegboard 1 at a time with MIN difficulty. Patient able to place 50 pennies on golf tees 1 at a time with MAX difficulty. Rest breaks as needed. ASSESSMENT: Patient worked at a slow and steady pace. Activity Tolerance: Patient tolerated treatment well      PLAN OF CARE:  Strengthening, Balance training, Functional mobility training, Neuromuscular re-education, Endurance training, Self-Care / ADL, Cognitive/Perceptual training, Safety education & training, Home management training, Patient/Caregiver education & training    Continue per OT POC for planned d/c on 22    Patient goals : \"to be able to wash my back and my feet. \"  Time Frame for Long term goals : 2 weeks  Long Term Goal 1: Pt within two weeks of evaluation will demonstrate progress to goals to address areas as stated below to increase functional independence for return to PLOF  Long Term Goal 2: Pt will increase independence with ADL Pt will increase independence with ADL transfers  Long Term Goal 3: Pt will increase bilateral UE strength to increase ease of ADL transfers        Therapy Time:   Individual Group Co-Treat   Time In 1430       Time Out 1500         Minutes 30                   Therapeutic activities: 30 minutes     Electronically signed by:    PRUDENCE Rajan,   8/17/2022, 4:44 PM

## 2022-08-17 NOTE — PROGRESS NOTES
Physical Therapy Rehab Treatment Note  Facility/Department: [facility]  Room: [place] [address]       NAME: Lucie Torres  : 1932 (80 y.o.)  MRN: [id number]  CODE STATUS: DNR-CC    Date of Service: 2022       Restrictions:  Restrictions/Precautions: Fall Risk       SUBJECTIVE:        Pain: Pt denies       OBJECTIVE:         Bed mobility  Rolling to Left: Modified independent  Rolling to Right: Modified independent  Supine to Sit: Modified independent  Sit to Supine: Modified independent    Transfers  Sit to Stand: Modified independent  Stand to sit: Modified independent  Bed to Chair: Modified independent  Car Transfer: Stand by assistance    Ambulation  WB Status: wbat  Ambulation  Surface: level tile;carpet  Device: Rolling Walker  Other Apparatus: O2  Assistance: Supervision  Quality of Gait: Assist for management of O2 line. Gait Deviations: Slow Lea;Decreased step length  Distance: 80ft        PT Exercises  A/AROM Exercises:seated: LAQ x20, march x20, hip add with ball x20, hip abd RTB x20, hamstring curls RTB x20  Functional Mobility Circuit Training: STS x8            ASSESSMENT/PROGRESS TOWARDS GOALS: Goals met  Body Structures, Functions, Activity Limitations Requiring Skilled Therapeutic Intervention: Decreased functional mobility ; Decreased safe awareness;Decreased endurance;Decreased balance    Goals:  Long Term Goals  Long term goal 1: Pt to complete bed mobility with indep-met  Long term goal 2: Pt to complete transfers with supervision-met  Long term goal 3: Pt to ambulate 50ft with LRD and supervision-met  Long term goal 4: Pt to manage 2 steps with B HR and Supervision-SBA d/t fatigue and safety  Long term goal 5: Pt to complete TUG within 40sec to demonstrate improved functional mobility/balance-met 29sec on   Patient Goals   Patient goals : does not state, family wishes pt to regain her indep    PLAN OF CARE/Safety:   Plan Comment: Cont per POC.   D/C held today d/t awaiting PIC line. Possible D/C to SNF tomorrow 8/18.         Therapy Time:   Individual   Time In 1500   Time Out 1530   Minutes 30     Minutes:  Transfer/Bed mobility training:10  Gait training:10  Neuro re education:0  Therapeutic ex:10    Tamie Cox PTA, 08/17/22 at 3:20 PM

## 2022-08-17 NOTE — PROGRESS NOTES
Assessment completed. VSS. Denied pain. LBM 8/15. Drain in place to RUE. Drained 20cc at 1930h. Serosang drainage present with small mucous type pieces also. ID ordered for anaerobic/ aerobic CX of drainage. Specimen obtained and sent to lab. Merrem D/c'd per ID and Zosyn started. First dose given without reaction/ side effects. PICC line placement pending. INR 2.8 on 8/16. PT/INR in AM. IR NP to come evaluate drain and dressing 8/17. No voiced complaints of dizziness, continues with scheduled dose of meclizine and has prn doses available. No distress noted. Call light within reach and bed alarm activated. Electronically signed by Nasim Barnes RN on 8/17/2022 at 1:35 AM    INR today 2.2.   Electronically signed by Nasim Barnes RN on 8/17/2022 at 6:08 AM

## 2022-08-17 NOTE — PROGRESS NOTES
Subjective: The patient complains of  moderate to severe acute    Right sided abdominal pain partially relieved by rest, PT, OT, drain and exacerbated by recent illness and exertion. I had been concerned about patients medical complexities and current active problems and barriers to progress including - recently presented with an abdominal mass felt to be either cancer or an abscess. Imaging was thinking it was more like an abscess. She was admitted to [facility] placed on antibiotics and a drain was placed under interventional radiology's guidance. I discussed current functional, rehabilitation, medical status with other rehabilitation providers including nursing and case management. According to recent nursing note, \" VSS. Denied pain. LBM 8/15. Drain in place to RUE. Drained 20cc at 1930h. Serosang drainage present with small mucous type pieces also. ID ordered for anaerobic/ aerobic CX of drainage. Specimen obtained and sent to lab. Merrem D/c'd per ID and Zosyn started. First dose given without reaction/ side effects. PICC line placement pending. INR 2.8 on 8/16. PT/INR in AM. IR NP to come evaluate drain and dressing 8/17. No voiced complaints of dizziness, continues with scheduled dose of meclizine and has prn doses available. No distress noted. Call light within reach and bed alarm activated. INR today 2. 2. \"    We have to go get her INR below 2 before we can put the PICC line in and proceed with her transition to skilled level. Emotional support given. ROS x10: The patient also complains of severely impaired mobility and activities of daily living. Otherwise no new problems with vision, hearing, nose, mouth, throat, dermal, cardiovascular, GI, , pulmonary, musculoskeletal, psychiatric or neurological. See Rehab H&P on Rehab chart dated .        Vital signs:  BP (!) 146/84   Pulse 71   Temp 99.1 °F (37.3 °C) (Oral)   Resp 17   Ht 5' (1.524 m)   Wt 159 lb 3.2 oz (72.2 kg) SpO2 91%   BMI 31.09 kg/m²   I/O:   PO/Intake:  fair PO intake, no problems observed or reported. Bowel/Bladder:  continent,  immodium for diarrhea and urinary urgency. General:  Patient is well developed, adequately nourished, non-obese and     well kempt. HEENT:    PERRLA, hearing intact to loud voice, external inspection of ear     and nose benign. Inspection of lips, tongue and gums    Musculoskeletal: No significant change in strength or tone. All joints stable. Inspection and palpation of digits and nails show no clubbing,       cyanosis or inflammatory conditions. Neuro/Psychiatric: Affect: flat. Alert and oriented to person, place and     situation. No significant change in deep tendon reflexes or     Sensation    Lungs:  Diminished, CTA-B. Respiration effort is normal at rest.     Heart:   S1 = S2, RRR. No loud murmurs. Abdomen:  Soft, non-tender, no enlargement of liver or spleen-75cc of bloody drainage in drainage bag-right abdomen- tenderness at the drain site  Extremities:  Trace lower extremity edema,    tenderness. Skin:   Intact to general survey, no visualized or palpated problems  right abd drain-scant drainage. Rehabilitation:  Physical therapy: FIMS:  Bed Mobility:      Transfers: Sit to Stand: Modified independent  Stand to sit: Modified independent  Bed to Chair: Modified independent, WB Status: wbat  Ambulation  Surface: level tile, carpet  Device: Rolling Walker  Other Apparatus: O2  Assistance: Supervision  Quality of Gait: Assist for management of O2 line. Cues for awareness to line with pt attempting to manage with fair follow through. Gait Deviations: Slow Lea, Decreased step length  Distance: 50ftx2  Comments: Pt ambulated to bathroom without LOB or c/o dizziness. , Stairs  # Steps : 4  Stairs Height: 6\"  Rails: Bilateral  Assistance: Stand by assistance  Comment: Fatigued after stairs.     FIMS:  ,  ,      Occupational therapy: FIMS:   ,  , Assessment: Pt continues to benefit from OT services to increase independence, safety and balance when performing self care ADLs. Speech therapy: FIMS:        Lab/X-ray studies reviewed, analyzed and discussed with patient and staff:   Recent Results (from the past 24 hour(s))   Protime-INR    Collection Time: 08/17/22  5:15 AM   Result Value Ref Range    Protime 23.9 (H) 12.3 - 14.9 sec    INR 2.2          Previous extensive, complex labs, notes and diagnostics reviewed and analyzed. ALLERGIES:    Allergies as of 08/03/2022 - Fully Reviewed 08/03/2022   Allergen Reaction Noted    Latex  07/19/2017    Tape [adhesive tape]  06/28/2016      (please also verify by checking MAR)       I reviewed her [facility] prescription monitoring service data sheets in hopes of eliminating polypharmacy and weaning to the lowest effective dose of pain medications and eliminating the concomitant use of benzodiazepines. I see no medications of concern. I see no habits of combining sedatives and narcotics. Complex Physical Medicine & Rehab Issues addressed during rehab stay:   Severe abnormality of gait and mobility and impaired self-care and ADL's secondary to progressive weakness dt   CMT neuropathy . Functional and medical status deteriorated and patient needed to be transferred off the floor see details above. Bowel with occasional diarrhea on antibiotics, and Bladder dysfunction monitoring neurogenic bladder:  frequent toileting, ambulate to bathroom with assistance, check post void residuals. Check for C.difficile x1 if >2 loose stools in 24 hours, continue bowel & bladder program.  Monitor bowel and bladder function. Lactinex 2 PO every AC. MOM prn, Brown Bomb prn, Glycerin suppository prn, enema prn.   Severe abdominal  pain as well as generalized OA pain,   Lumbar stenosis with neurogenic claudication: reassess pain every shift and prior to and after each therapy session, give Tylenol and prn Tylenol and Norco, modalities prn in therapy, Lidoderm, K-pad prn. Skin healing and breakdown risk:  continue pressure relief program.  Daily skin exams and reports from nursing. Severe fatigue due to nutritional and hydration deficiency: Add vitamin B12 vitamin D and CoQ10 continue to monitor I&Os, calorie counts prn, dietary consult prn. Add healthy HS snack. Acute episodic insomnia with situational adjustment disorder:  consider prn low dose Ambien, monitor for day time sedation. Add HS \"Tuck In\"  Falls risk elevated:  patient to use call light to get nursing assistance to get up, bed and chair alarm. Elevated DVT risk: progressive activities in PT, continue prophylaxis PORTILLO hose, elevation and Coumadin on hold pending a PICC line. Complex discharge planning: Plan for discharge Friday, August 19, 2022 Skilled then eventually to home alone with support from her son who lives close by and her daughter-in-law. We will also have home health care come out PT OT RN aide and push possibly a . Progress toward her final weekly team meeting    Monday to re-assess progress towards goals, discuss and address social, psychological and medical comorbidities and to address difficulties they may be having progressing in therapy. Patient and family education is in progress. The patient is to follow-up with their family physician after discharge. Complex Active General Medical Issues that complicate care addressed during rehab stay:     1.  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 Continue blood signs every shift focusing on heart rate, rhythm and blood pressure checks with orthostatic checks-monitoring the effect of exercise, therapy and posture. Consult hospitalist for backup medical and adjust/add medications (Coumadin, Coreg). Monitor heart rate and blood pressure as well as medications effects on vital signs before during and after therapy with especial focus on preventing orthostasis and falls risk. Patient was discharged to medical floor with probable [facility] transfer. See above    Hyponatremia-recheck BMP avoid excessive water  Anemia-Monitor vital signs monitor for orthostasis and tachycardia, check H&H prn. Vitamin B12 and iron-dose iron with food to prevent GI upset. Monitor for constipation. Monitor stools for blood. Hypothyroid-  Synthroid and titrate dosing. Follow vital signs, recheck TSH and thyroid studies as outpatient. 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 Osteoarthritis of lumbar spine with myelopathy-her extremity strengthening    Depression-emotional support provided daily, vitamin B12, encourage participation in rehabilitation support group and recreational therapy, adjust/add medications (B12, Celexa)  Abdominal mass abscess versus cancer-drain still in place pending cytology-patient is on IV Zosyn pending cultures. Closely monitor drainage from her drain site. GERD-Elevate head of bed after meals, monitor stools for blood, lowest effective dose of PPI, consider Tums. Yeast rash and immunosuppression-Micatin powder and Floranex  Long-term coumadinization  Long-term antibiotics with abdominal drain      Review and simplify inpatient and outpatient medications and dosing times. Awaiting PICC line and transition to [facility] for a longer term antibiotics.           Carol Ospina D.O., PM&R     Attending    [address]   Focus on endurance, activity pacing, reassessing rehab goals and discharge planning.

## 2022-08-17 NOTE — CARE COORDINATION
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  [id number] [facility] rehab admit 8/3/2022    CO MORBIDITIES:      Past Medical History:   Diagnosis Date    Ascending aortic aneurysm ([address] 2017    Charcot Arleen Tooth muscular atrophy     CHF (congestive heart failure) (HCC)     Chronic diastolic CHF (congestive heart failure) ([address] 2022    Depression     Descending aortic aneurysm ([address] 2017    Essential hypertension 2018    Headache     HTN (hypertension)     Impaired mobility and activities of daily living     Lumbar stenosis with neurogenic claudication     Myelopathy ([address]     Osteoarthritis      Past Surgical History:   Procedure Laterality Date    JOINT REPLACEMENT Bilateral     knees    OTHER SURGICAL HISTORY Right 2022    cat scan guided abdominal mass aspiration with drain placement by Dr. Ra Burciaga Left 2021    LEFT PLEURAL CATHETER INSERTION performed by Ilya Vaughn MD at [place] [id number] Left 2021    total of 755 cc removed per Dr Sai Blank specimen sent to lab        Restrictions  Restrictions/Precautions: Fall Risk  Position Activity Restriction  Other position/activity restrictions: Abdominal drain  CASE MANAGEMENT    Social/Functional History  Social/Functional History  Lives With: Alone  Type of Home: House  Home Layout: One level  Home Access: Stairs to enter with rails  Entrance Stairs - Number of Steps: 2  Entrance Stairs - Rails: Both  Bathroom Shower/Tub: Tub/Shower unit  Bathroom Equipment: Grab bars in shower  Home Equipment: Rebekah Plants, quad  Has the patient had two or more falls in the past year or any fall with injury in the past year?: No  Receives Help From: Family  ADL Assistance: Independent  Homemaking Assistance: Independent  Homemaking Responsibilities: Yes  Meal Prep Responsibility: Primary  Laundry Responsibility: Primary  Cleaning Responsibility: Primary  Shopping Responsibility: No (Son performs)  Ambulation Assistance: Independent ([address] and furniture walking in the home; Pt only wears AFO's when ambulating outside with QC. Inside she is always barefoot)  Transfer Assistance: Independent  Active : No  Patient's  Info: family - son lives close by; dtr lives in [place]  Additional Comments: [facility] information collected through chart review and dtr able to confirm. Pt declines teleinterpreter. Per pt's dtr, pt cannot hear the teleinterpreter and from previous experiences, the [facility] interpreters are unable to speak her particular dialect. Dtr reports that she would rather have her family present to clarify instructions. This writer discussed translation concerns with nursing manager who will investigate options other than family. Pts personal preferences: n/a    Pts assets/resources/support system: son, dtr, dtr-in-law    COVERAGE INFORMATION:Payor: MEDICARE / Plan: MEDICARE PART A AND B / Product Type: *No Product type* /       NURSING  No active isolations    Weight: 159 lb 3.2 oz (72.2 kg) / Body mass index is 31.09 kg/m². ADULT DIET; Regular;  No Added Salt (3-4 gm)    SpO2: 96 % (08/18/22 0734)  O2 Flow Rate (L/min): 3 L/min (08/15/22 1230)    Oxygen to be continued upon discharge: Yes  Home-going needs (nocturnal Pox, sleep study, RX, equipment): Yes    Skin Issues: No; drain--output measured daily  Home-going needs (education, training, RX, Wound RN): Drain education if going home    Pain Managed: Yes    Bladder continence: Yes  Discharging with Taylor: No   Training done: No   Urology following: No   Plan/date to remove taylor: No   Bladder retraining started: No    Bowel continence:  Yes  Last BM date: 8/15  Need for bowel program: No    Anticoagulants: None right now; Pending PICC placement and tx INR level; More than likely will DC on coumadin  Home-going needs (Education, labs): Yes    Antibiotics: Zosyn  Stop date: No official date; requires multi week doses  Home-going needs (education, RX, PICC): Yes; PICC      Other: PICC placement today      PHYSICAL THERAPY  Bed mobility:  Bed mobility  Bridging: Stand by assistance (08/04/22 1014)  Rolling to Left: Modified independent (08/17/22 1130)  Rolling to Right: Modified independent (08/17/22 1130)  Supine to Sit: Modified independent (08/17/22 1130)  Sit to Supine: Modified independent (08/17/22 1130)  Bed Mobility Comments: increased time and effort to complete all transitions. Hand over hand assist to complete each activity. (08/04/22 1014)  Bed Mobility  Additional Factors: Head of bed flat; With handrails;Verbal cues; Increased time to complete (08/14/22 1423)  Additional Factors: Head of bed flat; With handrails;Verbal cues; Increased time to complete (08/14/22 1423)  Roll Left  Assistance Level: Stand by assist (08/14/22 1423)  Roll Right  Assistance Level: Stand by assist (08/14/22 1423)  Sit to Supine  Assistance Level: Stand by assist (08/14/22 1423)  Supine to Sit  Assistance Level: Supervision (08/14/22 1423)  Scooting  Assistance Level: Minimal assistance (08/14/22 1423)  Skilled Clinical Factors: requires minimal assistance when supine to scoot to [facility] (08/14/22 1423)  Transfers:  Transfers  Sit to Stand: Modified independent (08/17/22 1513)  Stand to sit: Modified independent (08/17/22 1513)  Bed to Chair: Modified independent (08/17/22 1513)  Car Transfer: Stand by assistance (08/17/22 1513)  Comment: Patient requires min A to stand from chair without arm rest (08/11/22 1117)  Transfers  Surface: From bed; To bed (08/14/22 1423)  Additional Factors: Hand placement cues (08/14/22 1423)  Device: Epifanio Garcia (08/14/22 1423)  Sit to Stand  Assistance Level: Supervision (08/14/22 1423)  Stand to Sit  Assistance Level: Supervision (08/14/22 1423)  Skilled Clinical Factors: cues for improving overall quality with ww placement (08/14/22 1423)  Bed To/From Chair  Assistance Level: Supervision (08/06/22 1516)  Stand Pivot  Assistance Level: Contact guard assist (08/04/22 1312)  Gait:   Ambulation  WB Status: wbat (08/17/22 1513)  Ambulation  Surface: level tile;carpet (08/17/22 1513)  Device: Rolling Walker (08/17/22 1513)  Other Apparatus: O2 (08/17/22 1513)  Assistance: Supervision (08/17/22 1513)  Quality of Gait: Assist for management of O2 line. (08/17/22 1513)  Gait Deviations: Slow Raji;Decreased step length (08/17/22 1513)  Distance: 80ft (08/17/22 1513)  Comments: Pt ambulated to bathroom without LOB or c/o dizziness. (08/16/22 1116)  Ambulation  Surface: Level surface (08/14/22 1424)  Device: Rolling walker (08/14/22 1424)  Distance: 100', 20' x 2 (08/14/22 1424)  Activity: Within Room (08/06/22 1517)  Activity Comments: Reciprocal pattern (08/06/22 1517)  Additional Factors: Set-up; Verbal cues (08/14/22 1424)  Assistance Level: Stand by assist (08/14/22 1424)  Gait Deviations: Slow raji;Decreased step length bilateral;Decreased heel strike right;Decreased heel strike left; Wide base of support (08/14/22 1424)  Skilled Clinical Factors: Patient with AFO's donned, mild ff posture, good manuevering around objects (08/14/22 1424)  Stairs:  Stairs/Curb  Stairs?: Yes (08/17/22 1129)  Stairs  # Steps : 4 (08/17/22 1129)  Stairs Height: 6\" (08/17/22 1129)  Rails: Bilateral (08/17/22 1129)  Assistance: Stand by assistance (08/17/22 1129)  Comment: Fatigued after stairs. (08/17/22 1129)  Stairs  Stair Height: 6'' (08/06/22 1517)  Device: Bilateral handrails (08/06/22 1517)  Number of Stairs: 4 (08/06/22 1517)  Additional Factors: Set-up; Verbal cues; Non-reciprocal going up;Non-reciprocal going down (08/06/22 1517)  Assistance Level: Contact guard assist (08/06/22 1517)  Skilled Clinical Factors: Pt reuiring CGA with first two steps, but then Min A with third and Mod with fourth. Pt only CGA with descending forward. (08/04/22 1525)  W/C mobility:     LTG:  Long term goal 1: Pt to complete bed mobility with indep-met  Long term goal 2: Pt to complete transfers with supervision-met  Long term goal 3: Pt to ambulate 50ft with LRD and supervision-met  Long term goal 4: Pt to manage 2 steps with B HR and Supervision-SBA d/t fatigue and safety  Long term goal 5: Pt to complete TUG within 40sec to demonstrate improved functional mobility/balance-met 29sec on 8/16  PT Treatment Time:  1.5 hrs      OCCUPATIONAL THERAPY    EVALUATION SELF CARE STATUS:  Hand Dominance: Right  Feeding: Setup (08/04/22 1114)  Grooming: Setup; Increased time to complete (08/04/22 1114)  UE Bathing: Stand by assistance; Increased time to complete (08/04/22 1114)  LE Bathing: Moderate assistance; Increased time to complete (08/04/22 1114)  UE Dressing: Setup (08/12/22 1047)  LE Dressing: Moderate assistance;Dependent/Total (08/12/22 1047)  LE Dressing Skilled Clinical Factors: Dep to don/doff socks and shoes. Mod A to don pants over feet and bring to knees. (08/12/22 1047)  Toileting: Maximum assistance (08/04/22 1114)  Additional Comments: Pt participating in ADLs seated in w/c and standing by w/c using w/w for dynamic standing balance, CGA. (08/12/22 1047)             CURRENT SELF CARE:  Feeding  Assistance Level: Independent (08/16/22 1134)  Grooming/Oral Hygiene  Assistance Level:  Independent (08/17/22 1104)  Skilled Clinical Factors: wash hands in standing (08/10/22 1107)  Upper Extremity Bathing  Assistance Level: Supervision (08/16/22 1134)  Skilled Clinical Factors: Setup to adjust water temperature and hang up and remove hand held shower from galaviz. (08/16/22 1134)  Lower Extremity Bathing  Assistance Level: Stand by assist (08/16/22 1134)  Skilled Clinical Factors: B feet (08/08/22 1021)  Upper Extremity Dressing  Assistance Level: Set-up (08/16/22 1134)  Skilled Clinical Factors: pull down back (08/08/22 1021)  Lower Extremity Dressing  Assistance Level: Minimal assistance (08/16/22 1134)  Skilled Clinical Factors: thread R LE x 1/2 (08/16/22 1134)  Putting On/Taking Off Footwear  Equipment Provided: Sock aid (08/10/22 0941)  Assistance Level: Dependent (08/16/22 1134)  Skilled Clinical Factors: B socks (08/16/22 1134)  Toileting  Assistance Level: Modified independent (08/17/22 1104)  Skilled Clinical Factors: Supervision for clothing management in standing 2° c/o dizziness (08/15/22 1128)  Toilet Transfers  Technique: Stand step (08/17/22 1104)  Equipment: Grab bars;Standard toilet (08/17/22 1104)  Additional Factors: Verbal cues (08/10/22 1107)  Assistance Level: Supervision (08/17/22 1104)  Skilled Clinical Factors: ww (08/17/22 1104)  Tub/Shower Transfers  Type: Shower (08/16/22 1134)  Transfer From: Alec Au (08/16/22 1134)  Transfer To: Shower chair with back (08/16/22 1134)  Additional Factors:  With handrails (08/08/22 1021)  Assistance Level: Contact guard assist (08/16/22 1134)  Skilled Clinical Factors: CGA 2° 3 c/o dizziness with 1 LOB (08/16/22 1134)        LTG:  Eating  Assistance Needed: Independent  CARE Score: 6  Discharge Goal: Independent, Oral Hygiene  Assistance Needed: Independent  CARE Score: 6  Discharge Goal: Independent, [address] needed: Independent  CARE Score: 6  Discharge Goal: Independent, Shower/Bathe Self  Assistance Needed: Supervision or touching assistance  CARE Score: 4  Discharge Goal: Independent  Upper Body Dressing  Assistance Needed: Setup or clean-up assistance  CARE Score: 5  Discharge Goal: Independent, Lower Body Dressing  Assistance Needed: Partial/moderate assistance  CARE Score: 3  Discharge Goal: Independent, Putting On/Taking Off Footwear  Assistance Needed: Dependent  CARE Score: 1  Discharge Goal: Independent, Toilet PT  Occupational Therapist: Michael Jett OTR  Speech Therapist: Erasmo Garcia, SLP  Other: Anna العراقي RN      Electronically signed by KIN Lopez, LSW on 8/18/2022 at 3:58 PM

## 2022-08-17 NOTE — PLAN OF CARE
Problem: Discharge Planning  Goal: Discharge to home or other facility with appropriate resources  8/17/2022 0039 by Rodney Akhtar RN  Outcome: Progressing     Problem: ABCDS Injury Assessment  Goal: Absence of physical injury  8/17/2022 0039 by Rodney Akhtar RN  Outcome: Progressing     Problem: Skin/Tissue Integrity  Goal: Absence of new skin breakdown  Description: 1. Monitor for areas of redness and/or skin breakdown  2. Assess vascular access sites hourly  3. Every 4-6 hours minimum:  Change oxygen saturation probe site  4. Every 4-6 hours:  If on nasal continuous positive airway pressure, respiratory therapy assess nares and determine need for appliance change or resting period.   8/17/2022 0039 by Rodney Akhtar RN  Outcome: Progressing     Problem: Chronic Conditions and Co-morbidities  Goal: Patient's chronic conditions and co-morbidity symptoms are monitored and maintained or improved  8/17/2022 0039 by Rodney Akhtar RN  Outcome: Progressing

## 2022-08-17 NOTE — PROGRESS NOTES
OCCUPATIONAL THERAPY  INPATIENT REHAB TREATMENT NOTE  [facility]      NAME: Lilliam Torrez  : 1932 (80 y.o.)  MRN: [id number]  CODE STATUS: DNR-CC  Room: R250/R250-01    Date of Service: 2022    Referring Physician: Dr. 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 \"     Pain at start of treatment:  No 0/10    Pain at end of treatment:   No 0/10    COGNITION:  Orientation  Overall Orientation Status: Within Functional Limits  Cognition  Overall Cognitive Status: WFL  Cognition Comment: Comp Mod I, expression Mod I, social Ind, problem Sup, Memory Sup    OBJECTIVE:    Grooming/Oral Hygiene  Assistance Level: Independent  Toileting  Assistance Level: Modified independent  Toilet Transfers  Technique: Stand step  Equipment: Grab bars;Standard toilet  Assistance Level: Supervision  Skilled Clinical Factors: ww      FM Coordination and Cognition:  Patient engaged in B FM coordination and cognition to increase I with fasteners and small objects for ADL's and IADL's. Patient able to  bullet casings, small pegs and small colored balls with MAX difficulty from plate 1 at a time to sort into correct containers. Patient with MIN errors sorting correctly. Patient able to secure 2/3 lids on containers. Instrumental ADL's  Instrumental ADLs: Yes  Light Housekeeping  Light Housekeeping Level of Assistance: Modified independent  Light Housekeeping:  Laundry IADL:  Patient engaged in B UE ROM and cognition to increase I with ADL's, IADL's and transfers. Patient able to reach in various planes with 0 difficulty to fold linens. Patient able to stack like pieces of folded laundry in piles with 0 difficulty. Rest breaks as needed. ASSESSMENT: Patient worked at a slow and steady pace. Activity Tolerance: Patient tolerated treatment well      PLAN OF CARE:  Strengthening, Balance training, Functional mobility training, Neuromuscular re-education, Endurance training, Self-Care / ADL, Cognitive/Perceptual training, Safety education & training, Home management training, Patient/Caregiver education & training    Continue per OT POC for planned d/c on 22    Patient goals : \"to be able to wash my back and my feet. \"  Time Frame for Long term goals : 2 weeks  Long Term Goal 1: Pt within two weeks of evaluation will demonstrate progress to goals to address areas as stated below to increase functional independence for return to PLOF  Long Term Goal 2: Pt will increase independence with ADL Pt will increase independence with ADL transfers  Long Term Goal 3: Pt will increase bilateral UE strength to increase ease of ADL transfers        Therapy Time:   Individual Group Co-Treat   Time In 0930       Time Out 1030         Minutes 60                   ADL/IADL trainin minutes  Therapeutic activities: 30 minutes     Electronically signed by:    PRUDENCE Manriquez,   2022, 11:05 AM

## 2022-08-17 NOTE — PROGRESS NOTES
Physical Therapy Rehab Treatment Note  [facility]  [facility]       NAME: Yann Blanton  : 1932 (80 y.o.)  MRN: [id number]  CODE STATUS: DNR-CC    Date of Service: 2022       Restrictions:  Restrictions/Precautions: Fall Risk       SUBJECTIVE: Pt states she is ok. Pain pt declined pain       OBJECTIVE:         Bed mobility  Rolling to Left: Modified independent  Rolling to Right: Modified independent  Supine to Sit: Modified independent  Sit to Supine: Modified independent    Transfers  Sit to Stand: Modified independent  Stand to sit: Modified independent  Bed to Chair: Modified independent    Ambulation  Surface: level tile;carpet  Device: Rolling Walker  Other Apparatus: O2  Assistance: Supervision  Distance: 50ftx2    Stairs/Curb  Stairs?: Yes  Stairs  # Steps : 4  Stairs Height: 6\"  Rails: Bilateral  Assistance: Stand by assistance  Comment: Fatigued after stairs. PT Exercises  A/AROM Exercises: supine: SLR x10, heel slides x20, hip abd x20, SAQ x20, bridges x10, LTR x10, hooklying march x10, hooklying hip add with ball x20; seated: LAQ x20, march x20  Functional Mobility Circuit Training: STS x8            ASSESSMENT/PROGRESS TOWARDS GOALS: Reports of dizziness with sup to sit. Body Structures, Functions, Activity Limitations Requiring Skilled Therapeutic Intervention: Decreased functional mobility ; Decreased safe awareness;Decreased endurance;Decreased balance    Goals:  Long Term Goals  Long term goal 1: Pt to complete bed mobility with indep-met  Long term goal 2: Pt to complete transfers with supervision-met  Long term goal 3: Pt to ambulate 50ft with LRD and supervision-met  Long term goal 4: Pt to manage 2 steps with B HR and Supervision-SBA d/t fatigue and safety  Long term goal 5: Pt to complete TUG within 40sec to demonstrate improved functional mobility/balance-met 29sec on   Patient Goals   Patient goals : does not state, family wishes pt to regain her indep    PLAN OF CARE/Safety:   Plan Comment: Cont per POC.       Therapy Time:   Individual   Time In 1100   Time Out 1200   Minutes 60     Minutes:  Transfer/Bed mobility training:10  Gait trainin  Neuro re education:0  Therapeutic ex:30    Gladis Cisse PTA, 22 at 11:40 AM

## 2022-08-17 NOTE — PROGRESS NOTES
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     CHF (congestive heart failure) ([facility])     Chronic diastolic CHF (congestive heart failure) ([facility] 4/1/2022    Depression     Descending aortic aneurysm ([facility] 6/23/2017    Essential hypertension 2/16/2018    Headache     HTN (hypertension)     Impaired mobility and activities of daily living     Lumbar stenosis with neurogenic claudication     Myelopathy ([address]     Osteoarthritis      Past Surgical History:   Procedure Laterality Date    JOINT REPLACEMENT Bilateral     knees    OTHER SURGICAL HISTORY Right 07/21/2022    cat scan guided abdominal mass aspiration with drain placement by Dr. Real Bolus Left 02/25/2021    LEFT PLEURAL CATHETER INSERTION performed by Jessie Jacobson MD at [place] [id number] Left 01/29/2021    total of 755 cc removed per Dr Андрей Anne specimen sent to lab     Family History   Problem Relation Age of Onset    Arthritis Mother     Arthritis Father     High Blood Pressure Father      Social History     Socioeconomic History    Marital status:       Spouse name: None    Number of children: 2    Years of education: None    Highest education level: None   Tobacco Use    Smoking status: Never    Smokeless tobacco: Never   Substance and Sexual Activity    Alcohol use: No     Alcohol/week: 0.0 standard drinks    Drug use: No   Social History Narrative    , Lives With: Alone, son lives down the street, dtr is in the area    Type of Home: [address]: One level    Home Access: Stairs to enter with rails- Number of Steps: 2- Rails: Both    Bathroom Shower/Tub: Tub/Shower unit, Bathroom Equipment: Grab bars in shower, Shower chair    Home Equipment: Rolling walker, Cane(Pt infrequemtly uses DME for ambulation and prefers to furniture walk in home)    ADL Assistance: Independent, [address]: Independent    Homemaking Responsibilities: Yes    Ambulation Assistance: Independent, Transfer Assistance: Independent    Additional Comments: Son stops over 2 times daily           Subjective/HPI  No new events overnight. No cp no sob. drain tube  Still draining serous fluid minimal. Pt requires PICC INR 2.2 this am    EKG:        Review of Systems:   Review of Systems   Constitutional: Negative. Negative for diaphoresis and fatigue. HENT: Negative. Eyes: Negative. Respiratory: Negative. Negative for cough, chest tightness, shortness of breath, wheezing and stridor. Cardiovascular: Negative. Negative for chest pain, palpitations and leg swelling. Gastrointestinal: Negative. Negative for blood in stool and nausea. Genitourinary: Negative. Musculoskeletal:  Positive for arthralgias, back pain and gait problem. Skin: Negative. Neurological:  Positive for weakness. Negative for dizziness, syncope and light-headedness. Hematological: Negative. Psychiatric/Behavioral: Negative. Physical Examination:    BP (!) 146/84   Pulse 71   Temp 99.1 °F (37.3 °C) (Oral)   Resp 17   Ht 5' (1.524 m)   Wt 159 lb 3.2 oz (72.2 kg)   SpO2 91%   BMI 31.09 kg/m²    Physical Exam   Constitutional: No distress. She appears chronically ill. HENT:   Normal cephalic and Atraumatic   Eyes: Pupils are equal, round, and reactive to light. Neck: Thyroid normal. No JVD present. No neck adenopathy. No thyromegaly present. Cardiovascular: Normal rate, regular rhythm, intact distal pulses and normal pulses. Murmur heard.   Pulmonary/Chest: Effort normal and breath sounds normal. She has no wheezes. She has no rales. She exhibits no tenderness. Abdominal: Soft. Bowel sounds are normal. There is no abdominal tenderness. Musculoskeletal:         General: No tenderness or edema. Normal range of motion. Cervical back: Normal range of motion and neck supple. Neurological: She is alert and oriented to person, place, and time. Skin: Skin is warm. No cyanosis. Nails show no clubbing.      LABS:  CBC:   Lab Results   Component Value Date/Time    WBC 7.0 08/15/2022 02:00 PM    RBC 4.25 08/15/2022 02:00 PM    HGB 11.6 08/15/2022 02:00 PM    HCT 35.5 08/15/2022 02:00 PM    MCV 83.5 08/15/2022 02:00 PM    MCH 27.3 08/15/2022 02:00 PM    MCHC 32.7 08/15/2022 02:00 PM    RDW 16.1 08/15/2022 02:00 PM     08/15/2022 02:00 PM     CBC with Differential:    Lab Results   Component Value Date/Time    WBC 7.0 08/15/2022 02:00 PM    RBC 4.25 08/15/2022 02:00 PM    HGB 11.6 08/15/2022 02:00 PM    HCT 35.5 08/15/2022 02:00 PM     08/15/2022 02:00 PM    MCV 83.5 08/15/2022 02:00 PM    MCH 27.3 08/15/2022 02:00 PM    MCHC 32.7 08/15/2022 02:00 PM    RDW 16.1 08/15/2022 02:00 PM    BANDSPCT 2 07/24/2022 05:00 AM    METASPCT 2 07/24/2022 05:00 AM    LYMPHOPCT 7.0 07/24/2022 05:00 AM    MONOPCT 5.8 07/24/2022 05:00 AM    BASOPCT 1.0 07/24/2022 05:00 AM    MONOSABS 0.7 07/24/2022 05:00 AM    LYMPHSABS 0.8 07/24/2022 05:00 AM    EOSABS 0.0 07/24/2022 05:00 AM    BASOSABS 0.1 07/24/2022 05:00 AM     CMP:    Lab Results   Component Value Date/Time     08/15/2022 02:00 PM    K 4.2 08/15/2022 02:00 PM    K 3.2 07/24/2022 05:00 AM     08/15/2022 02:00 PM    CO2 28 08/15/2022 02:00 PM    BUN 15 08/15/2022 02:00 PM    CREATININE 0.63 08/15/2022 02:00 PM    GFRAA >60.0 08/15/2022 02:00 PM    LABGLOM >60.0 08/15/2022 02:00 PM    GLUCOSE 115 08/15/2022 02:00 PM    PROT 6.0 07/24/2022 05:00 AM    LABALBU 3.0 08/04/2022 03:28 AM    CALCIUM 8.5 08/15/2022 02:00 PM    BILITOT 0.3 07/24/2022 05:00 AM    ALKPHOS 51 07/24/2022 05:00 AM    AST 20 07/24/2022 05:00 AM    ALT 19 07/24/2022 05:00 AM     BMP:    Lab Results   Component Value Date/Time     08/15/2022 02:00 PM    K 4.2 08/15/2022 02:00 PM    K 3.2 07/24/2022 05:00 AM     08/15/2022 02:00 PM    CO2 28 08/15/2022 02:00 PM    BUN 15 08/15/2022 02:00 PM    LABALBU 3.0 08/04/2022 03:28 AM    CREATININE 0.63 08/15/2022 02:00 PM    CALCIUM 8.5 08/15/2022 02:00 PM    GFRAA >60.0 08/15/2022 02:00 PM    LABGLOM >60.0 08/15/2022 02:00 PM    GLUCOSE 115 08/15/2022 02:00 PM     Magnesium:    Lab Results   Component Value Date/Time    MG 2.1 08/03/2022 05:18 AM     Troponin:    Lab Results   Component Value Date/Time    TROPONINI <0.010 07/18/2022 12:30 PM        Active Hospital Problems    Diagnosis Date Noted    A-fib [facility]) [I48.91]      Priority: High    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 [Z74.09, Z78.9]      Priority: High    Adnexal mass [N94.89] 08/16/2022     Priority: Medium    Abscess of abdominal cavity (HCC) [K65.1] 08/09/2022     Priority: Medium    Intra-abdominal abscess ([facility] [K65.1] 08/08/2022     Priority: Medium    Abdominal pain [R10.9] 07/27/2022     Priority: Medium    Lumbar stenosis with neurogenic claudication [M48.062] 06/02/2016     Priority: Medium    Chronic diastolic CHF (congestive heart failure) ([facility] [I50.32] 04/01/2022     Priority: Low    Acute on chronic combined systolic and diastolic CHF (congestive heart failure) ([facility] [I50.43] 01/25/2021     Priority: Low    Vertigo [R42]      Priority: Low    Descending aortic aneurysm ([facility] [I71.9] 06/23/2017     Priority: Low        Assessment/Plan:  Impression/Plan:   Abd Abscess/Mass - Drain tube in place   LVEF 60  Frequent PAF - rate is controlled on exam. On Warfarin  CAD- moderate - no angina. Continue BB  HTN Stable  continue meds. Low salt diet.    HPL - statin on hold  Ascending AO aneurysm-  had increased in size and measures 5.3cm distal arch. 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 Xarelto stopped and on Warfarin. Had long discussion with pt and daughter. They no longer want aggressive Rx and does not want to be transferred to [facility]. Hold Warfarin until INR <2.0 then PICC per Specials team.  Pt will need bridged with Lovenox if needed. INR 2.2 this am. Probably tomorrow for PICC.            Electronically signed by Lee Valladares MD on 8/17/2022 at 9:01 AM

## 2022-08-18 ENCOUNTER — APPOINTMENT (OUTPATIENT)
Dept: INTERVENTIONAL RADIOLOGY/VASCULAR | Age: 87
DRG: 073 | End: 2022-08-18
Attending: PHYSICAL MEDICINE & REHABILITATION
Payer: MEDICARE

## 2022-08-18 VITALS
BODY MASS INDEX: 31.25 KG/M2 | HEART RATE: 68 BPM | RESPIRATION RATE: 18 BRPM | DIASTOLIC BLOOD PRESSURE: 80 MMHG | WEIGHT: 159.2 LBS | TEMPERATURE: 97.9 F | HEIGHT: 60 IN | OXYGEN SATURATION: 98 % | SYSTOLIC BLOOD PRESSURE: 139 MMHG

## 2022-08-18 LAB
INR BLD: 1.7
PROTHROMBIN TIME: 19.7 SEC (ref 12.3–14.9)
SARS-COV-2, NAAT: NOT DETECTED

## 2022-08-18 PROCEDURE — 87635 SARS-COV-2 COVID-19 AMP PRB: CPT

## 2022-08-18 PROCEDURE — 85610 PROTHROMBIN TIME: CPT

## 2022-08-18 PROCEDURE — 97530 THERAPEUTIC ACTIVITIES: CPT

## 2022-08-18 PROCEDURE — 97110 THERAPEUTIC EXERCISES: CPT

## 2022-08-18 PROCEDURE — 97116 GAIT TRAINING THERAPY: CPT

## 2022-08-18 PROCEDURE — 36415 COLL VENOUS BLD VENIPUNCTURE: CPT

## 2022-08-18 PROCEDURE — 6370000000 HC RX 637 (ALT 250 FOR IP): Performed by: INTERNAL MEDICINE

## 2022-08-18 PROCEDURE — 2500000003 HC RX 250 WO HCPCS: Performed by: INTERNAL MEDICINE

## 2022-08-18 PROCEDURE — 97535 SELF CARE MNGMENT TRAINING: CPT

## 2022-08-18 PROCEDURE — 99232 SBSQ HOSP IP/OBS MODERATE 35: CPT | Performed by: INTERNAL MEDICINE

## 2022-08-18 PROCEDURE — 6360000002 HC RX W HCPCS: Performed by: INTERNAL MEDICINE

## 2022-08-18 PROCEDURE — 2580000003 HC RX 258: Performed by: INTERNAL MEDICINE

## 2022-08-18 PROCEDURE — 6370000000 HC RX 637 (ALT 250 FOR IP): Performed by: PHYSICAL MEDICINE & REHABILITATION

## 2022-08-18 PROCEDURE — 02HV33Z INSERTION OF INFUSION DEVICE INTO SUPERIOR VENA CAVA, PERCUTANEOUS APPROACH: ICD-10-PCS | Performed by: INTERNAL MEDICINE

## 2022-08-18 PROCEDURE — 2709999900 IR PICC WO SQ PORT/PUMP > 5 YEARS

## 2022-08-18 PROCEDURE — 99239 HOSP IP/OBS DSCHRG MGMT >30: CPT | Performed by: PHYSICAL MEDICINE & REHABILITATION

## 2022-08-18 PROCEDURE — 36573 INSJ PICC RS&I 5 YR+: CPT

## 2022-08-18 RX ORDER — WARFARIN SODIUM 5 MG/1
10 TABLET ORAL
Status: COMPLETED | OUTPATIENT
Start: 2022-08-18 | End: 2022-08-18

## 2022-08-18 RX ORDER — WARFARIN SODIUM 5 MG/1
5 TABLET ORAL DAILY
Qty: 30 TABLET | Refills: 3 | Status: SHIPPED | OUTPATIENT
Start: 2022-08-19 | End: 2022-09-29

## 2022-08-18 RX ORDER — CHOLECALCIFEROL (VITAMIN D3) 50 MCG
2000 TABLET ORAL
Qty: 60 TABLET | Refills: 5 | Status: SHIPPED | OUTPATIENT
Start: 2022-08-18

## 2022-08-18 RX ADMIN — Medication 10 ML: at 09:45

## 2022-08-18 RX ADMIN — CARVEDILOL 6.25 MG: 6.25 TABLET, FILM COATED ORAL at 09:45

## 2022-08-18 RX ADMIN — LACTOBACILLUS TAB 2 TABLET: TAB at 09:45

## 2022-08-18 RX ADMIN — CITALOPRAM HYDROBROMIDE 20 MG: 10 TABLET ORAL at 09:45

## 2022-08-18 RX ADMIN — FUROSEMIDE 20 MG: 20 TABLET ORAL at 09:45

## 2022-08-18 RX ADMIN — WARFARIN SODIUM 10 MG: 5 TABLET ORAL at 17:20

## 2022-08-18 RX ADMIN — LACTOBACILLUS TAB 2 TABLET: TAB at 14:05

## 2022-08-18 RX ADMIN — PIPERACILLIN AND TAZOBACTAM 3375 MG: 3; .375 INJECTION, POWDER, FOR SOLUTION INTRAVENOUS at 04:56

## 2022-08-18 RX ADMIN — SODIUM CHLORIDE 250 ML: 9 INJECTION, SOLUTION INTRAVENOUS at 11:02

## 2022-08-18 RX ADMIN — CARVEDILOL 6.25 MG: 6.25 TABLET, FILM COATED ORAL at 17:20

## 2022-08-18 RX ADMIN — PIPERACILLIN AND TAZOBACTAM 3375 MG: 3; .375 INJECTION, POWDER, FOR SOLUTION INTRAVENOUS at 14:08

## 2022-08-18 RX ADMIN — MECLIZINE 12.5 MG: 12.5 TABLET ORAL at 04:53

## 2022-08-18 RX ADMIN — Medication 100 MG: at 17:20

## 2022-08-18 RX ADMIN — LIDOCAINE HYDROCHLORIDE 5 ML: 20 INJECTION, SOLUTION INFILTRATION; PERINEURAL at 11:03

## 2022-08-18 RX ADMIN — LEVOTHYROXINE SODIUM 88 MCG: 88 TABLET ORAL at 09:45

## 2022-08-18 RX ADMIN — PANTOPRAZOLE SODIUM 40 MG: 40 TABLET, DELAYED RELEASE ORAL at 04:53

## 2022-08-18 RX ADMIN — MICONAZOLE NITRATE: 2 POWDER TOPICAL at 09:46

## 2022-08-18 RX ADMIN — FERROUS SULFATE TAB 325 MG (65 MG ELEMENTAL FE) 325 MG: 325 (65 FE) TAB at 17:20

## 2022-08-18 RX ADMIN — Medication 2000 UNITS: at 17:20

## 2022-08-18 ASSESSMENT — ENCOUNTER SYMPTOMS
BACK PAIN: 1
RESPIRATORY NEGATIVE: 1
COUGH: 0
EYES NEGATIVE: 1
BLOOD IN STOOL: 0
SHORTNESS OF BREATH: 0
WHEEZING: 0
GASTROINTESTINAL NEGATIVE: 1
STRIDOR: 0
NAUSEA: 0
CHEST TIGHTNESS: 0

## 2022-08-18 ASSESSMENT — PAIN SCALES - GENERAL: PAINLEVEL_OUTOF10: 0

## 2022-08-18 NOTE — PROGRESS NOTES
Agree with LPN assessment of pt. Updated son on discharge plan for later today. Pt in therapy at this time.

## 2022-08-18 NOTE — PROGRESS NOTES
OCCUPATIONAL THERAPY  INPATIENT REHAB TREATMENT NOTE  [facility]      NAME: Ag Ricci  : 1932 (80 y.o.)  MRN: [id number]  CODE STATUS: DNR-CC  Room: R250R250-01    Date of Service: 2022    Referring Physician: Dr. 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 This. \"     Pain at start of treatment:  No 0/10    Pain at end of treatment:   No 0/10    COGNITION:  Orientation  Overall Orientation Status: Within Functional Limits  Cognition  Overall Cognitive Status: WFL  Cognition Comment: Comp Mod I, expression Mod I, social Ind, problem Sup, Memory Sup    OBJECTIVE:    Grooming/Oral Hygiene  Assistance Level: Independent  Upper Extremity Dressing  Assistance Level: Set-up  Skilled Clinical Factors: Patient doffed shirt and sweater Independently - donned hospital gown  Putting On/Taking Off Footwear  Assistance Level: Dependent  Skilled Clinical Factors: donning B personal socks/AFO's/shoes - doffing B hospital non-slip socks  Toileting  Assistance Level: Modified independent  Toilet Transfers  Technique: Stand step  Equipment: Grab bars;Standard toilet  Assistance Level: Supervision  Skilled Clinical Factors: ww    Blocks and bolts:   Patient engaged in therapeutic activity to increase B FM coordination/strengthening, B UE ROM/strengthening and cognition to increase I with ADL's, IADL's and transfers. Patient able to reach in lateral planes with 0 difficulty.   Patient able to reach in forward planes with 0 difficulty to assemble blocks and bolts design. Patient instructed to assemble 17 piece blocks and bolts design following visual design. Patient with MOD difficulty inserting bolts into blocks. Patient with MAX difficulty threading wing nuts onto bolts. Patient assembled 17 pieces with 17 pieces being assembled correctly. Patient noted to require MOD verbal cues throughout activity. Patient required increased time to complete 2° difficulty. Patient with rest breaks as needed. Tetris:  Patient engaged in therapeutic activity to increase B FM coordination, B UE ROM/strengthening, new learning and problem solving for ADL's, IADL's and transfers. Patient completed Tetris activity following verbal instructions and demonstration. Patient with MIN FM difficulty picking up pieces from tabletop. Patient with 0 difficulty reaching in forward planes. Patient with 0 difficulty reaching in lateral planes. Patient with MAX errors sorting 80 pieces by shape. Rest breaks as needed. ASSESSMENT: Patient pleasant throughout session. Activity Tolerance: Patient tolerated treatment well      PLAN OF CARE:  Strengthening, Balance training, Functional mobility training, Neuromuscular re-education, Endurance training, Self-Care / ADL, Cognitive/Perceptual training, Safety education & training, Home management training, Patient/Caregiver education & training    Continue per OT POC for planned d/c on 8-17-22    Patient goals : \"to be able to wash my back and my feet. \"  Time Frame for Long term goals : 2 weeks  Long Term Goal 1: Pt within two weeks of evaluation will demonstrate progress to goals to address areas as stated below to increase functional independence for return to PLOF  Long Term Goal 2: Pt will increase independence with ADL Pt will increase independence with ADL transfers  Long Term Goal 3: Pt will increase bilateral UE strength to increase ease of ADL transfers        Therapy Time:   Individual Group Co-Treat   Time In 1400       Time Out 1500         Minutes 60                   ADL/IADL training: 15 minutes  Therapeutic activities: 45 minutes     Electronically signed by:    PRUDENCE Rajan,   8/18/2022, 2:55 PM

## 2022-08-18 NOTE — PROGRESS NOTES
Occupational Therapy Get up and Go Note            Date: 2022  Patient Name: Venu Stringer        MRN: [id number]    Account #: [de-identified]  : 1932  (80 y.o.)      Subjective:  Patient states:  Go bathroom  Pain:  Pain at start of treatment: No    Pain at end of treatment: No    Objective:    Patient completed supine -> sit at MOD I.  ADL:  Feeding:  Independent  Grooming: Mod I  Toileting:  Supervision  Toilet transfers:  SBA with ww  Patient completed sit -> supine at MOD I. Patient left supine in bed with call light in reach, bed alarm on and all needs met. Treatment consisted of:   [x] ADL Training  [] Strengthening   [x] Transfer Training    [] DME Education  [] HEP   [] Patient Education  [] Other:    Safety:  Safety Devices  Safety Devices in place:   Type of devices:  All fall risk precautions in place      Therapy Time:   Individual Group Co-Treat   Time In 0742       Time Out 0805         Minutes 23             ADL/IADL trainin minutes         Electronically signed by:    PRUDENCE Lepe    2022, 9:32 AM

## 2022-08-18 NOTE — DISCHARGE SUMMARY
Subjective: The patient complains of  moderate to severe acute    Right sided abdominal pain partially relieved by rest, PT, OT, drain and exacerbated by recent illness and exertion. I had been concerned about patients medical complexities and current active problems and barriers to progress including - recently presented with an abdominal mass felt to be either cancer or an abscess. Imaging was thinking it was more like an abscess. She was admitted to [facility] placed on antibiotics and a drain was placed under interventional radiology's guidance. She has done well in rehab though requires additional therapy. She will also require longstanding IV antibiotics and to keep in the drain of her right lower abdomen. We put a PICC line in prior to discharge. She had to have her Coumadin held bridging Lovenox and is now to resume her Coumadin and go to the skilled facility on long-term IV antibiotics. The abdominal drain is to stay in as long as possible. She will follow-up with Dr. Michelle Elizabeth as an outpatient. I discussed current functional, rehabilitation, medical status with other rehabilitation providers including nursing and case management. According to recent nursing note, \"VSS. Denied pain. LBM 8/15. Drain in place. Dressing changed by IR. Drainage remains small amounts and same appearance of serosang drainage with small mucous type specks. Continues with IV zosyn. PT/ INR in AM. No distress noted. Call light within reach and bed alarm activated. INR 1.7. Covid specimen obtained and sent to lab d/t potential d/c. Covid negative. \"      Hospital Course: The patient was admitted to the Rehabilitation Unit to address medical, ADL and mobility deficits as detailed above and below. The patient was enrolled in acute therapy program including but not limited to PT, OT program.  Weekly team meetings were held to assess functional progress toward their goals.   The patient's medical issues were no problems observed or reported. Bowel/Bladder:  continent,  immodium for diarrhea prn and urinary urgency. General:  Patient is well developed, adequately nourished, non-obese and     well kempt. HEENT:    PERRLA, hearing intact to loud voice, external inspection of ear     and nose benign. Inspection of lips, tongue and gums    Musculoskeletal: No significant change in strength or tone. All joints stable. Inspection and palpation of digits and nails show no clubbing,       cyanosis or inflammatory conditions. Neuro/Psychiatric: Affect: flat. Alert and oriented to person, place and     situation. No significant change in deep tendon reflexes or     Sensation    Lungs:  Diminished, CTA-B. Respiration effort is normal at rest.     Heart:   S1 = S2, RRR. No loud murmurs. Abdomen:  Soft, non-tender, no enlargement of liver or spleen-75cc of bloody drainage in drainage bag-right abdomen- tenderness at the drain site  Extremities:  Trace lower extremity edema,    tenderness. Skin:   Intact to general survey, no visualized or palpated problems  right abd drain-scant drainage. Rehabilitation:  Physical therapy: FIMS:  Bed Mobility:      Transfers: Sit to Stand: Modified independent  Stand to sit: Modified independent  Bed to Chair: Modified independent, WB Status: wbat  Ambulation  Surface: level tile, carpet  Device: Rolling Walker  Other Apparatus: O2  Assistance: Supervision  Quality of Gait: Assist for management of O2 line. Gait Deviations: Slow Lea, Decreased step length  Distance: 80ft  Comments: Pt ambulated to bathroom without LOB or c/o dizziness. , Stairs  # Steps : 4  Stairs Height: 6\"  Rails: Bilateral  Assistance: Stand by assistance  Comment: Fatigued after stairs. FIMS:  ,  ,      Occupational therapy: FIMS:   ,  , Assessment: Pt continues to benefit from OT services to increase independence, safety and balance when performing self care ADLs.     Speech therapy: FIMS: Lab/X-ray studies reviewed, analyzed and discussed with patient and staff:   Recent Results (from the past 24 hour(s))   Protime-INR    Collection Time: 08/18/22  4:41 AM   Result Value Ref Range    Protime 19.7 (H) 12.3 - 14.9 sec    INR 1.7    COVID-19, Rapid    Collection Time: 08/18/22  5:00 AM    Specimen: Nasopharyngeal Swab   Result Value Ref Range    SARS-CoV-2, NAAT Not Detected Not Detected         Previous extensive, complex labs, notes and diagnostics reviewed and analyzed. ALLERGIES:    Allergies as of 08/03/2022 - Fully Reviewed 08/03/2022   Allergen Reaction Noted    Latex  07/19/2017    Tape [adhesive tape]  06/28/2016      (please also verify by checking MAR)      Today I evaluated this patient for periodic reassessment of medical and functional status. The patient was discussed in detail at the treatment team meeting focusing on current medical issues, progress in therapies, social issues, psychological issues, barriers to progress and strategies to address these barriers, and discharge planning. See the addendum to rehab progress note-as a second progress note in the chart. The patient continues to be high risk for future disability and their medical and rehabilitation prognosis continue to be good and therefore, we will continue the patient's rehabilitation course as planned. The patient's tentative discharge date was set. Patient and family education was discussed. The patient was made aware of the team discussion regarding their progress. Complex Physical Medicine & Rehab Issues addressed during rehab stay:   Severe abnormality of gait and mobility and impaired self-care and ADL's secondary to progressive weakness dt   CMT neuropathy . Functional and medical status deteriorated and patient needed to be transferred off the floor see details above.   Bowel with occasional diarrhea on antibiotics, and Bladder dysfunction monitoring neurogenic bladder:  frequent toileting, ambulate to bathroom with assistance, check post void residuals. Check for C.difficile x1 if >2 loose stools in 24 hours, continue bowel & bladder program.  Monitor bowel and bladder function. Lactinex 2 PO every AC. MOM prn, Brown Bomb prn, Glycerin suppository prn, enema prn. Severe abdominal  pain as well as generalized OA pain,   Lumbar stenosis with neurogenic claudication: reassess pain every shift and prior to and after each therapy session, give Tylenol and prn Tylenol and Norco, modalities prn in therapy, Lidoderm, K-pad prn. Skin healing and breakdown risk:  continue pressure relief program.  Daily skin exams and reports from nursing. Severe fatigue due to nutritional and hydration deficiency: Add vitamin B12 vitamin D and CoQ10 continue to monitor I&Os, calorie counts prn, dietary consult prn. Add healthy HS snack. Acute episodic insomnia with situational adjustment disorder:  consider prn low dose Ambien, monitor for day time sedation. Add HS \"Tuck In\"  Falls risk elevated:  patient to use call light to get nursing assistance to get up, bed and chair alarm. Elevated DVT risk: progressive activities in PT, continue prophylaxis PORTILLO hose, elevation and Coumadin on hold pending a PICC line. Then resume. Complex discharge planning: Plan for discharge Friday, August 19, 2022 Skilled then eventually to home alone with support from her son who lives close by and her daughter-in-law. We will also have home health care come out PT OT RN aide and push possibly a . SP her final weekly team meeting    Monday to re-assess progress towards goals, discuss and address social, psychological and medical comorbidities and to address difficulties they may be having progressing in therapy. Patient and family education is in progress. The patient is to follow-up with their family physician after discharge.         Complex Active General Medical Issues that complicated care  during rehab stay: 1. 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 Continue blood signs every shift focusing on heart rate, rhythm and blood pressure checks with orthostatic checks-monitoring the effect of exercise, therapy and posture. Consult hospitalist for backup medical and adjust/add medications (Coumadin, Coreg). Monitor heart rate and blood pressure as well as medications effects on vital signs before during and after therapy with especial focus on preventing orthostasis and falls risk. Patient was discharged to medical floor with probable [facility] transfer. See above    Hyponatremia-recheck BMP avoid excessive water  Anemia-Monitor vital signs monitor for orthostasis and tachycardia, check H&H prn. Vitamin B12 and iron-dose iron with food to prevent GI upset. Monitor for constipation. Monitor stools for blood. Hypothyroid-  Synthroid and titrate dosing. Follow vital signs, recheck TSH and thyroid studies as outpatient. 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     Osteoarthritis of lumbar spine with myelopathy-her extremity strengthening    Depression-emotional support provided daily, vitamin B12, encourage participation in rehabilitation support group and recreational therapy, adjust/add medications (B12, Celexa)  Abdominal mass abscess versus cancer-drain still in place pending cytology-patient is on IV Zosyn

## 2022-08-18 NOTE — PROGRESS NOTES
OCCUPATIONAL THERAPY  INPATIENT REHAB TREATMENT NOTE  [facility]      NAME: Chikis Mariscal  : 1932 (80 y.o.)  MRN: [id number]  CODE STATUS: DNR-CC  [facility]    Date of Service: 2022    Referring Physician: Dr. John Elliott Diagnosis: Impaired mobility and ADL's due to Charcot Rao [place] Tooth Neuropathy    Restrictions  Restrictions/Precautions  Restrictions/Precautions: Fall Risk  Required Braces or Orthoses?: No     Patient's date of birth confirmed: Yes    SAFETY:  Safety Devices  Safety Devices in place: Yes  Type of devices: All fall risk precautions in place    SUBJECTIVE:  Subjective: \" Thank you girl. \"     Pain at start of treatment:  No 0/10    Pain at end of treatment:   No 0/10    COGNITION:  Orientation  Overall Orientation Status: Within Functional Limits  Cognition  Overall Cognitive Status: WFL  Cognition Comment: Comp Mod I, expression Mod I, social Ind, problem Sup, Memory Sup    OBJECTIVE:    Grooming/Oral Hygiene  Assistance Level: Independent  Toileting  Assistance Level: Modified independent  Toilet Transfers  Technique: Stand step  Equipment: Grab bars;Standard toilet  Assistance Level: Supervision  Skilled Clinical Factors: ww    FM Strengthening:  Patient engaged in B FM coordination/strengthening and B UE ROM to increase I with ADL's, IADL's and transfers. Patient able to pinch open yellow graded clips (1 lb pressure) with 0 difficulty. Patient able to pinch open red graded clips (2 lb pressure) with 0 difficulty. Patient able to pinch open green graded clips (4 lb pressure) with MIN difficulty. Patient able to pinch open blue graded clips (6 lb pressure) with MOD difficulty. Patient able to pinch open black graded clips (8 lb pressure) with MAX difficulty. Patient with MOD difficulty with B UE reaching. Patient returned to room to change clothing for upcoming procedure.     Upper Extremity Dressing  Skilled Clinical Factors: Patient doffed shirt and sweater Independently - [facility] gown  Putting On/Taking Off Footwear  Assistance Level: Dependent  Skilled Clinical Factors: doffing B personal socks/AFO's/shoes - donning [facility] non-slip socks  Patient left seated in chair with call light in reach, chair alarm in place and all needs met. ASSESSMENT: Patient pleasant throughout session. Activity Tolerance: Patient tolerated treatment well      PLAN OF CARE:  Strengthening, Balance training, Functional mobility training, Neuromuscular re-education, Endurance training, Self-Care / ADL, Cognitive/Perceptual training, Safety education & training, Home management training, Patient/Caregiver education & training    Continue per OT POC for planned d/c on 22    Patient goals : \"to be able to wash my back and my feet. \"  Time Frame for Long term goals : 2 weeks  Long Term Goal 1: Pt within two weeks of evaluation will demonstrate progress to goals to address areas as stated below to increase functional independence for return to PLOF  Long Term Goal 2: Pt will increase independence with ADL Pt will increase independence with ADL transfers  Long Term Goal 3: Pt will increase bilateral UE strength to increase ease of ADL transfers        Therapy Time:   Individual Group Co-Treat   Time In 1000       Time Out 1030         Minutes 30                   ADL/IADL trainin minutes  Therapeutic activities: 10 minutes     Electronically signed by:    PRUDENCE Mistry,   2022, 10:50 AM

## 2022-08-18 NOTE — PROGRESS NOTES
Physical Therapy Missed Treatment   Facility/Department: [address] [place] [address] R250/R250-01    NAME: Mandy Correia    : 1932 (80 y.o.)  MRN: [id number]    Account: [de-identified]  Gender: female    Patient initially off floor for scheduled therapy session at 1100 for special procedures. Patient returned to floor at [phone] and declines therapy secondary to pain. ADDIE Reardon notified. Missed 60 minutes of scheduled therapy.       Nohemi Braun PTA, 22 at 11:49 AM

## 2022-08-18 NOTE — PROGRESS NOTES
Assessment completed. VSS. Denied pain. LBM 8/15. Drain in place. Dressing changed by IR. Drainage remains small amounts and same appearance of serosang drainage with small mucous type specks. Continues with IV zosyn. PT/ INR in AM. No distress noted. Call light within reach and bed alarm activated. Electronically signed by Ravinder Montoya RN on 8/18/2022 at 3:09 AM    INR 1.7. Covid specimen obtained and sent to lab d/t potential d/c. Covid negative.   Electronically signed by Ravinder Montoya RN on 8/18/2022 at 6:02 AM

## 2022-08-18 NOTE — PROGRESS NOTES
Nutrition Assessment     Type and Reason for Visit: Reassess    Nutrition Recommendations/Plan:   Continue Current Diet     Malnutrition Assessment:  Malnutrition Status: No malnutrition    Nutrition Assessment:  Pt remains stable from a nutritional standpoint, with verbalized good appetite, noted good intake at meals, with no nutritional complaints. Discharge planned for tomorrow. Nutrition Related Findings:   PMH-htn, CHF, CHARCOT JENA TOOTH NEUROPATHY ; adm s/p Abd Abscess. +1 Gen & BLE edema noted. Last BM noted 8/16 Wound Type: None    Current Nutrition Therapies:    ADULT DIET; Regular;  No Added Salt (3-4 gm)    Anthropometric Measures:  Height: 5' (152.4 cm)  Current Body Wt: 159 lb 3.2 oz (72.2 kg) (8/12)   BMI: 31.1    Nutrition Diagnosis:   No nutrition diagnosis at this time     Nutrition Interventions:   Food and/or Nutrient Delivery: Continue Current Diet  Nutrition Education/Counseling: No recommendation at this time  Coordination of Nutrition Care: Continue to monitor while inpatient       Goals:     Goals: PO intake 75% or greater       Nutrition Monitoring and Evaluation:      Food/Nutrient Intake Outcomes: Food and Nutrient Intake  Physical Signs/Symptoms Outcomes: Weight, Biochemical Data, Fluid Status or Edema    Gabby Almendarez RD, LD

## 2022-08-18 NOTE — PROGRESS NOTES
Physical Therapy Rehab Treatment Note  Facility/Department: [facility]  Room: [place] [address]       NAME: Thuy Castillo  : 1932 (80 y.o.)  MRN: [id number]  CODE STATUS: DNR-CC    Date of Service: 2022       Restrictions:  Restrictions/Precautions: Fall Risk  Position Activity Restriction  Other position/activity restrictions: Abdominal drain       SUBJECTIVE: Patient without new reports        Pain  Patient denies pain     OBJECTIVE:   Transfers  Sit to Stand: Modified independent  Stand to sit: Modified independent  Bed to Chair: Modified independent    Ambulation  WB Status: wbat  Ambulation  Surface: level tile;carpet  Device: Rolling Walker  Other Apparatus: O2  Assistance: Supervision  Quality of Gait: decreased gait speed/raji, no LOB  Gait Deviations: Slow Raji;Decreased step length  Distance: 100 feet    Stairs  # Steps : 4  Stairs Height: 6\"  Rails: Bilateral  Assistance: Stand by assistance  Comments: non reciprocal, slight posterior lean while descending      PT Exercises  Exercise Treatment: STS x5 from W/C  A/AROM Exercises: seated: LAQ, march, hip abd/add with 1# ankle weight, hip adduction with ball 5\"x20, hamstring curl RTB x20     ASSESSMENT/PROGRESS TOWARDS GOALS: Patient fatigues quickly with stair training. Prefers to pull up on ww for sit to stand transfers.         Goals:  Long Term Goals  Long term goal 1: Pt to complete bed mobility with indep  Long term goal 2: Pt to complete transfers with indep  Long term goal 3: Pt to ambulate 50ft with LRD and indep  Long term goal 4: Pt to manage 2 steps with B HR and Supervision  Long term goal 5: Pt to complete TUG within 20sec to demonstrate improved functional mobility/balance    Therapy Time:   Individual   Time In 1500   Time Out 1600   Minutes 60     Minutes:60  Transfer/Bed mobility training:10  Gait trainin  Therapeutic ex:30      Flavio Metzger PTA, 22 at 3:59 PM

## 2022-08-18 NOTE — PLAN OF CARE
Problem: Discharge Planning  Goal: Discharge to home or other facility with appropriate resources  Outcome: Completed     Problem: ABCDS Injury Assessment  Goal: Absence of physical injury  Outcome: Completed     Problem: Skin/Tissue Integrity  Goal: Absence of new skin breakdown  Description: 1. Monitor for areas of redness and/or skin breakdown  2. Assess vascular access sites hourly  3. Every 4-6 hours minimum:  Change oxygen saturation probe site  4. Every 4-6 hours:  If on nasal continuous positive airway pressure, respiratory therapy assess nares and determine need for appliance change or resting period.   Outcome: Completed     Problem: Chronic Conditions and Co-morbidities  Goal: Patient's chronic conditions and co-morbidity symptoms are monitored and maintained or improved  Outcome: Completed

## 2022-08-18 NOTE — CARE COORDINATION
JANETW spoke with Waleska Lang at [facility] and scheduled transport for [address] [place] [address],[address] on 8/18 via cot to d/c to [facility].  Electronically signed by KIN Peck, JACKLYN on 8/18/2022 at 12:04 PM

## 2022-08-18 NOTE — PROGRESS NOTES
Progress Note  Patient: Ebony Fabry  Unit/Bed: R250/R250-01  YOB: 1932  MRN: [id number]  Acct: [de-identified]   Admitting Diagnosis: Impaired mobility and activities of daily living [Z74.09, Z78.9]  Impaired mobility and ADLs [Z74.09, Z78.9]  Admit Date:  8/3/2022  Hospital Day: 13    Chief Complaint:  CAD    Histories:  Past Medical History:   Diagnosis Date    Ascending aortic aneurysm ([address] 6/23/2017    Charcot Arleen Tooth muscular atrophy     CHF (congestive heart failure) ([facility])     Chronic diastolic CHF (congestive heart failure) ([facility] 4/1/2022    Depression     Descending aortic aneurysm ([facility] 6/23/2017    Essential hypertension 2/16/2018    Headache     HTN (hypertension)     Impaired mobility and activities of daily living     Lumbar stenosis with neurogenic claudication     Myelopathy ([address]     Osteoarthritis      Past Surgical History:   Procedure Laterality Date    JOINT REPLACEMENT Bilateral     knees    OTHER SURGICAL HISTORY Right 07/21/2022    cat scan guided abdominal mass aspiration with drain placement by Dr. Dane Arenas Left 02/25/2021    LEFT PLEURAL CATHETER INSERTION performed by Ruthie Lennon MD at [place] [id number] Left 01/29/2021    total of 755 cc removed per Dr Escoto Batter specimen sent to lab     Family History   Problem Relation Age of Onset    Arthritis Mother     Arthritis Father     High Blood Pressure Father      Social History     Socioeconomic History    Marital status:       Spouse name: None    Number of children: 2    Years of education: None    Highest education level: None   Tobacco Use    Smoking status: Never    Smokeless tobacco: Never   Substance and Sexual Activity    Alcohol use: No     Alcohol/week: 0.0 standard drinks    Drug use: No   Social History Narrative    , Lives With: Alone, son lives down the street, dtr is in the area    Type of Home: [facility]  in [address]: One level    Home normal. She has no wheezes. She has no rales. She exhibits no tenderness. Abdominal: Soft. Bowel sounds are normal. There is no abdominal tenderness. Musculoskeletal:         General: No tenderness or edema. Normal range of motion. Cervical back: Normal range of motion and neck supple. Neurological: She is alert and oriented to person, place, and time. Skin: Skin is warm. No cyanosis. Nails show no clubbing.      LABS:  CBC:   Lab Results   Component Value Date/Time    WBC 7.0 08/15/2022 02:00 PM    RBC 4.25 08/15/2022 02:00 PM    HGB 11.6 08/15/2022 02:00 PM    HCT 35.5 08/15/2022 02:00 PM    MCV 83.5 08/15/2022 02:00 PM    MCH 27.3 08/15/2022 02:00 PM    MCHC 32.7 08/15/2022 02:00 PM    RDW 16.1 08/15/2022 02:00 PM     08/15/2022 02:00 PM     CBC with Differential:    Lab Results   Component Value Date/Time    WBC 7.0 08/15/2022 02:00 PM    RBC 4.25 08/15/2022 02:00 PM    HGB 11.6 08/15/2022 02:00 PM    HCT 35.5 08/15/2022 02:00 PM     08/15/2022 02:00 PM    MCV 83.5 08/15/2022 02:00 PM    MCH 27.3 08/15/2022 02:00 PM    MCHC 32.7 08/15/2022 02:00 PM    RDW 16.1 08/15/2022 02:00 PM    BANDSPCT 2 07/24/2022 05:00 AM    METASPCT 2 07/24/2022 05:00 AM    LYMPHOPCT 7.0 07/24/2022 05:00 AM    MONOPCT 5.8 07/24/2022 05:00 AM    BASOPCT 1.0 07/24/2022 05:00 AM    MONOSABS 0.7 07/24/2022 05:00 AM    LYMPHSABS 0.8 07/24/2022 05:00 AM    EOSABS 0.0 07/24/2022 05:00 AM    BASOSABS 0.1 07/24/2022 05:00 AM     CMP:    Lab Results   Component Value Date/Time     08/15/2022 02:00 PM    K 4.2 08/15/2022 02:00 PM    K 3.2 07/24/2022 05:00 AM     08/15/2022 02:00 PM    CO2 28 08/15/2022 02:00 PM    BUN 15 08/15/2022 02:00 PM    CREATININE 0.63 08/15/2022 02:00 PM    GFRAA >60.0 08/15/2022 02:00 PM    LABGLOM >60.0 08/15/2022 02:00 PM    GLUCOSE 115 08/15/2022 02:00 PM    PROT 6.0 07/24/2022 05:00 AM    LABALBU 3.0 08/04/2022 03:28 AM    CALCIUM 8.5 08/15/2022 02:00 PM    BILITOT 0.3 07/24/2022 05:00 AM    ALKPHOS 51 07/24/2022 05:00 AM    AST 20 07/24/2022 05:00 AM    ALT 19 07/24/2022 05:00 AM     BMP:    Lab Results   Component Value Date/Time     08/15/2022 02:00 PM    K 4.2 08/15/2022 02:00 PM    K 3.2 07/24/2022 05:00 AM     08/15/2022 02:00 PM    CO2 28 08/15/2022 02:00 PM    BUN 15 08/15/2022 02:00 PM    LABALBU 3.0 08/04/2022 03:28 AM    CREATININE 0.63 08/15/2022 02:00 PM    CALCIUM 8.5 08/15/2022 02:00 PM    GFRAA >60.0 08/15/2022 02:00 PM    LABGLOM >60.0 08/15/2022 02:00 PM    GLUCOSE 115 08/15/2022 02:00 PM     Magnesium:    Lab Results   Component Value Date/Time    MG 2.1 08/03/2022 05:18 AM     Troponin:    Lab Results   Component Value Date/Time    TROPONINI <0.010 07/18/2022 12:30 PM        Active Hospital Problems    Diagnosis Date Noted    A-fib [facility]) [I48.91]      Priority: High    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 [Z74.09, Z78.9]      Priority: High    Change or removal of drains [Z48.03] 08/17/2022     Priority: Medium    Adnexal mass [N94.89] 08/16/2022     Priority: Medium    Abscess of abdominal cavity (HCC) [K65.1] 08/09/2022     Priority: Medium    Intra-abdominal abscess ([facility] [K65.1] 08/08/2022     Priority: Medium    Abdominal pain [R10.9] 07/27/2022     Priority: Medium    Lumbar stenosis with neurogenic claudication [M48.062] 06/02/2016     Priority: Medium    Chronic diastolic CH[facility] (congestive [facility] failure) ([facility] [I50.32] 04/01/2022     Priority: Low    Acute on chronic combined systolic and diastolic CHF (congestive heart failure) ([facility] [I50.43] 01/25/2021     Priority: Low    Vertigo [R42]      Priority: Low    Descending aortic aneurysm ([facility] [I71.9] 06/23/2017     Priority: Low        Assessment/Plan:  Impression/Plan:   Abd Abscess/Mass - Drain tube in place   LVEF 60  Frequent PAF - rate is controlled on exam. On Warfarin  CAD- moderate - no angina. Continue BB  HTN Stable  continue meds. Low salt diet.    HPL - statin on hold  Ascending AO aneurysm-  had increased in size and measures 5.3cm distal arch. 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 Xarelto stopped and on Warfarin. Had long discussion with pt and daughter. They no longer want aggressive Rx and does not want to be transferred to [facility]. Hold Warfarin until INR <2.0 then PICC per Specials team.  Pt will need bridged with Lovenox if needed. INR 1.7 this am.  PICC today. Warfarin 10 mg today ( discussed with Carolyne, Nurse)then back to usual 5 mg QD. Follow INR at [facility].  Keep INR 2-3         Electronically signed by Lee Valladares MD on 8/18/2022 at 10:40 AM

## 2022-08-18 NOTE — PROGRESS NOTES
Hospitalist Consult/Progress Note  8/17/2022 8:37 PM    Assessment and Plan: Aortic aneurysm with new mural thrombus: Ascending aortic aneurysm, increased in size, 5.3cm. New mural thrombus despite Xeralto. Initially treated with heparin gtt. Now transitioned Lovenox bridge to coumadin. RLQ mass/abscess: on IV rocephin. Followed by ID and oncology  Paroxysmal A-fib: BB, coumadin  CAD: continue BB, statin held  Hypothyroidism: Levothyroxine  HTN: Coreg  AE of chronic systolic HF: EF 26%. low dose lasix. Discharge planning: SW on board. Patient to be discharged tomorrow. Medications reconciled for discharge. High Risk Readmission Screening Tool Score Noted. 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  Active Problems:    A-fib (HCC)    Lumbar stenosis with neurogenic claudication    Abdominal pain    Intra-abdominal abscess (HCC)    Abscess of abdominal cavity (HCC)    Adnexal mass    Change or removal of drains    Descending aortic aneurysm (HCC)    Vertigo    Acute on chronic combined systolic and diastolic CHF (congestive heart failure) (HCC)    Chronic diastolic CHF (congestive heart failure) (HCC)  Resolved Problems:    Impaired mobility and ADLs      ** Total time spent reviewing medical records, evaluating patient, speaking with RN's and consultants where I was focused exclusively on this patient:  minutes. This time is excluding time spent performing procedures or significant events occurring earlier or later in the day requiring my attention and focus. Subjective:   Admit Date: 8/3/2022  PCP: Ceasar Faria MD      Patient was seen and examined. She is hard of hearing. Denied fever or chills. No headache or dizziness. No chest pain or shortness of breath. No abdominal pain or nausea. No calf tenderness.     Objective:     Vitals:    08/16/22 1744 08/16/22 2125 08/17/22 0708 08/17/22 1908   BP: (!) 156/92 127/84 (!) 146/84 113/76   Pulse: 73 64 71 75   Resp:  19 17 18   Temp:  98 °F (36.7 °C) 99.1 °F (37.3 °C) 97.6 °F (36.4 °C)   TempSrc:  Oral Oral    SpO2:  96% 91% 97%   Weight:       Height:         General appearance: Alert, oriented x4, no acute distress  Neurological: Alert, awake, and orientated x 4. Motor and sensory grossly intact. No focal deficits. GCS of 15. Lungs: CTAB, no rhonchi, rales, wheezes or use of accessory muscles  Heart:  S1, S2 normal, RRR, murmur present  Abdomen: (+) BS, soft, non-tender; non distended no guarding or rigidity. Extremities: Trace BLE edema, no calf tenderness      Medications:      sodium chloride      sodium chloride      sodium chloride        piperacillin-tazobactam  3,375 mg IntraVENous Q8H    lidocaine  5 mL IntraDERmal Once    lidocaine  5 mL IntraDERmal Once    sodium chloride flush  5-40 mL IntraVENous 2 times per day    meclizine  12.5 mg Oral QAM AC    coenzyme Q10  100 mg Oral Dinner    clotrimazole   Topical BID    carvedilol  6.25 mg Oral BID WC    citalopram  20 mg Oral Daily    ferrous sulfate  325 mg Oral Every Other Day    furosemide  20 mg Oral Daily    lactobacillus acidophilus  2 tablet Oral TID    levothyroxine  88 mcg Oral Daily    miconazole   Topical BID    pantoprazole  40 mg Oral QAM AC    warfarin placeholder: dosing by pharmacy   Other RX Placeholder    Vitamin D  2,000 Units Oral Dinner    cyanocobalamin  1,000 mcg IntraMUSCular Weekly    lidocaine  3 patch TransDERmal Daily       LABS Reviewed    IMAGING Reviewed    ADRIENNE Diego - CNP  [facility] Hospitalist    Additional work up or/and treatment plan may be added today or then after based on clinical progression. I am managing a portion of pt care. Some medical issues are handled by other specialists and Primary Rehabilitation provider. Additional work up and treatment should be done in out pt setting by pt PCP and other out pt providers.

## 2022-08-18 NOTE — PROGRESS NOTES
Assessment completed. A&O x4. Denies pain at this time. RUQ drain in place. Drsg clean, dry and intact. INR 1.7 today. [facility] called this nurse and PICC scheduled for this morning. Dr Negrita King made aware. Per Dr Negrita King, give pt 10 mg of Coumadin after PICC placement. Then will order for dc 5 mg of Coumadin. Will see pt in office. Pt to be dc'd to [facility] today. Pt transported to [facility] via bed. No s/s of distress. Electronically signed by Fidelia Hidalgo LPN on 7/05/3098 at 11:63 AM      Perfect served Dr Tiffany Kirk regarding script for skilled. Dr Tiffany Kirk called this nurse and stated pt does not need script for skilled since on dc instructions and antibiotic was reconciled. Electronically signed by Fidelia Hidalgo LPN on 0/38/4425 at 3:76 PM      [facility] here to  pt to transport to [facility]. No s/s of distress. Son and daughter in law notified via phone. Report called to [facility].  Electronically signed by Fidelia Hidalgo LPN on 0/78/1235 at 5:29 PM

## 2022-08-18 NOTE — PROGRESS NOTES
INDIVIDUALIZED OVERALL REHAB PLAN OF CARE  ADDENDUM TO REHAB PROGRESS NOTE-for audit purposes must also refer to this day's clinical note and combine the information      Date: 2022  Patient Name: Mert Coleman   Room: W096/W712-57    MRN: [id number]    : 1932  (80 y.o.)  Gender: female       Today 2022 during weekly team meeting, I reviewed the patient Mert Coleman in detail with the therapists and nurses involved in patient's care gathering complex physiatric data regarding current medical issues, progress in therapies, factors limiting progress, social issues, psychological issues, ongoing therapeutic plans and discharge planning. Legend:  I= independent Im =Modified independent  S=Supervised SB=stand by MAGANA=set up CG=contact fran Min= minimal Mod=Moderate Max=maximal Max of 2 =maximal assist of 2 people      CURRENT FUNCTIONAL STATUS:    Barriers to progress and discharge complex medical conditions, severe pain, and complex social situations      NURSING ISSUES:     To SNF pending PIC   VSS. Denied pain. LBM 8/15. Drain in place. Dressing changed by IR. Drainage remains small amounts and same appearance of serosang drainage with small mucous type specks. Continues with IV zosyn. PT/ INR in AM. No distress noted. Call light within reach and bed alarm activated. Electronically signed by Adelaida Aleman RN on 2022 at 3:09 AM     INR 1.7. Covid specimen obtained and sent to lab d/t potential d/c. Covid negative. Nursing will continue to focus on bowel and bladder continence transitioning toward independence by time of discharge. Monitoring post void residuals monitoring for severe constipation and bowel obstruction.     Bowel function- continent/normal  Plans to address- teach spinal cord style bowel program     Bladder function-  continent    Plans to address- schedule voids before bed and therapy     Skin deficits- dressing changes   Plans to address- special mattress Hydration/Nutritional deficits- monitoring for dysphagia  Plans to address-Push PO, assist with feeds as needed     Low BP- continue to monitor Ortho BPs  Plans to address- check ortho BPs before therapies-and use abdominal binder and TEDs as needed    Pt and Family training goals-  teach home technology options like Pretty use and home assistive devices      Focus on achieving ADL goals with co-treating with OT when possible. Focus on cognition and co treat with SLP when possible. PHYSICAL THERAPY  Bed mobility:  Bed mobility  Bridging: Stand by assistance (08/04/22 1014)  Rolling to Left: Modified independent (08/17/22 1130)  Rolling to Right: Modified independent (08/17/22 1130)  Supine to Sit: Modified independent (08/17/22 1130)  Sit to Supine: Modified independent (08/17/22 1130)  Bed Mobility Comments: increased time and effort to complete all transitions. Hand over hand assist to complete each activity. (08/04/22 1014)  Bed Mobility  Additional Factors: Head of bed flat; With handrails;Verbal cues; Increased time to complete (08/14/22 1423)  Additional Factors: Head of bed flat; With handrails;Verbal cues; Increased time to complete (08/14/22 1423)  Roll Left  Assistance Level: Stand by assist (08/14/22 1423)  Roll Right  Assistance Level: Stand by assist (08/14/22 1423)  Sit to Supine  Assistance Level: Stand by assist (08/14/22 1423)  Supine to Sit  Assistance Level: Supervision (08/14/22 1423)  Scooting  Assistance Level: Minimal assistance (08/14/22 1423)  Skilled Clinical Factors: requires minimal assistance when supine to scoot to [facility] (08/14/22 1423)  Transfers:  Transfers  Sit to Stand: Modified independent (08/17/22 1513)  Stand to sit: Modified independent (08/17/22 1513)  Bed to Chair: Modified independent (08/17/22 1513)  Car Transfer: Stand by assistance (08/17/22 1513)  Comment: Patient requires min A to stand from chair without arm rest (08/11/22 1117)  Transfers  Surface: From bed; To bed (08/14/22 1423)  Additional Factors: Hand placement cues (08/14/22 1423)  Device: [facility] (08/14/22 1423)  Sit to Stand  Assistance Level: Supervision (08/14/22 1423)  Stand to Sit  Assistance Level: Supervision (08/14/22 1423)  Skilled Clinical Factors: cues for improving overall quality with ww placement (08/14/22 1423)  Bed To/From Chair  Assistance Level: Supervision (08/06/22 1516)  Stand Pivot  Assistance Level: Contact guard assist (08/04/22 1312)  Gait:   Ambulation  WB Status: wbat (08/17/22 1513)  Ambulation  Surface: level tile;carpet (08/17/22 1513)  Device: Rolling Walker (08/17/22 1513)  Other Apparatus: O2 (08/17/22 1513)  Assistance: Supervision (08/17/22 1513)  Quality of Gait: Assist for management of O2 line. (08/17/22 1513)  Gait Deviations: Slow Raji;Decreased step length (08/17/22 1513)  Distance: 80ft (08/17/22 1513)  Comments: Pt ambulated to bathroom without LOB or c/o dizziness. (08/16/22 1116)  Ambulation  Surface: Level surface (08/14/22 1424)  Device: Rolling walker (08/14/22 1424)  Distance: 100', 20' x 2 (08/14/22 1424)  Activity: Within Room (08/06/22 1517)  Activity Comments: Reciprocal pattern (08/06/22 1517)  Additional Factors: Set-up; Verbal cues (08/14/22 1424)  Assistance Level: Stand by assist (08/14/22 1424)  Gait Deviations: Slow raji;Decreased step length bilateral;Decreased heel strike right;Decreased heel strike left; Wide base of support (08/14/22 1424)  Skilled Clinical Factors: Patient with AFO's donned, mild ff posture, good manuevering around objects (08/14/22 1424)  Stairs:  Stairs/Curb  Stairs?: Yes (08/17/22 1129)  Stairs  # Steps : 4 (08/17/22 1129)  Stairs Height: 6\" (08/17/22 1129)  Rails: Bilateral (08/17/22 1129)  Assistance: Stand by assistance (08/17/22 1129)  Comment: Fatigued after stairs.  (08/17/22 1129)  Stairs  Stair Height: 6'' (08/06/22 1517)  Device: Bilateral handrails (08/06/22 1517)  Number of Stairs: 4 (08/06/22 1517)  Additional Factors: Set-up; Verbal cues; Non-reciprocal going up;Non-reciprocal going down (08/06/22 1517)  Assistance Level: Contact guard assist (08/06/22 1517)  Skilled Clinical Factors: Pt reuiring CGA with first two steps, but then Min A with third and Mod with fourth. Pt only CGA with descending forward. (08/04/22 1525)  W/C mobility:           Pt and Family training goals-  Focus on sequencing and endurance        OCCUPATIONAL THERAPY  Feeding  Assistance Level: Independent (08/16/22 1134)  Grooming/Oral Hygiene  Assistance Level: Independent (08/17/22 1104)  Skilled Clinical Factors: wash hands in standing (08/10/22 1107)  Upper Extremity Bathing  Assistance Level: Supervision (08/16/22 1134)  Skilled Clinical Factors: Setup to adjust water temperature and hang up and remove hand held shower from galaviz. (08/16/22 1134)  Lower Extremity Bathing  Assistance Level: Stand by assist (08/16/22 1134)  Skilled Clinical Factors: B feet (08/08/22 1021)  Upper Extremity Dressing  Assistance Level: Set-up (08/16/22 1134)  Skilled Clinical Factors: pull down back (08/08/22 1021)  Lower Extremity Dressing  Assistance Level: Minimal assistance (08/16/22 1134)  Skilled Clinical Factors: thread R LE x 1/2 (08/16/22 1134)  Putting On/Taking Off Footwear  Equipment Provided: Sock aid (08/10/22 0941)  Assistance Level: Dependent (08/16/22 1134)  Skilled Clinical Factors: B socks (08/16/22 1134)  Toileting  Assistance Level: Modified independent (08/17/22 1104)  Skilled Clinical Factors: Supervision for clothing management in standing 2° c/o dizziness (08/15/22 1128)  Toilet Transfers  Technique: Stand step (08/17/22 1104)  Equipment: Anthonette Adie bars;Standard toilet (08/17/22 1104)  Additional Factors: Verbal cues (08/10/22 1107)  Assistance Level: Supervision (08/17/22 1104)  Skilled Clinical Factors: ww (08/17/22 1104)  Tub/Shower Transfers  Type: Shower (08/16/22 1134)  Transfer From: Dulcie Sicks (08/16/22 1134)  Transfer To:  Shower chair with back (08/16/22 1134)  Additional Factors: With handrails (08/08/22 1021)  Assistance Level: Contact guard assist (08/16/22 1134)  Skilled Clinical Factors: CGA 2° 3 c/o dizziness with 1 LOB (08/16/22 1134)      Plans for addressing ADL deficits affecting: Focus on sequencing. Bladder function disrupting functional progress- continent      Plans to address- coordinate scheduled voids before bed and therapy with nursing     Skin deficits- complex wound dressing changes affecting ADLs   Plans to address- coordinate patient dressing changes education with nursing as part of ADL training                SPEECH THERAPY/SLP             Plans for cognitive and communication issues affecting addressing:   Pt and Family training goals-  teach home tecnology options like Pretty use and home assistive devices       Diet/Swallow:                           COGNITION  OT: Cognition Comment: Comp Mod I, expression Mod I, social Ind, problem Sup, Memory Sup  SP:        Social History     Socioeconomic History    Marital status:       Spouse name: Not on file    Number of children: 2    Years of education: Not on file    Highest education level: Not on file   Occupational History    Not on file   Tobacco Use    Smoking status: Never    Smokeless tobacco: Never   Substance and Sexual Activity    Alcohol use: No     Alcohol/week: 0.0 standard drinks    Drug use: No    Sexual activity: Not on file   Other Topics Concern    Not on file   Social History Narrative    , Lives With: Alone, son lives down the street, dtr is in the area    Type of Home: [address]: One level    Home Access: Stairs to enter with rails- Number of Steps: 2- Rails: Both    Bathroom Shower/Tub: Tub/Shower unit, Bathroom Equipment: Grab bars in shower, Shower chair    Home Equipment: Rolling walker, Cane(Pt infrequemtly uses DME for ambulation and prefers to furniture walk in home)    ADL Assistance: Independent, Homemaking Assistance: Independent    Homemaking Responsibilities: Yes    Ambulation Assistance: Independent, Transfer Assistance: Independent    Additional Comments: Son stops over 2 times daily         Social Determinants of Health     Financial Resource Strain: Not on file   Food Insecurity: Not on file   Transportation Needs: Not on file   Physical Activity: Not on file   Stress: Not on file   Social Connections: Not on file   Intimate Partner Violence: Not on file   Housing Stability: Not on file           THERAPY, MEDICAL AND NURSING COORDINATION:    [x]  Pain medication before therapies     [x]  Check orthostatic BP and monitor heart rate and medications effects with therapy      [x]  Ambulate to the bathroom in room    [x]  Add scheduled rest beaks     [x]  In room therapies as needed      Discharge date set for:               August 18, 2022--SNF SP PIC      No longer able to go Home with:    Alone vs with dtr  with help from   dtr  and son                  Or preferably     Outpatient Therapy:  []  PT    []  OT    []  ST   []  Rehab Psych                 Equipment:  [employer]      At D/C their function is goaled at:   PT:Long term goal 1: Pt to complete bed mobility with indep-met  Long term goal 2: Pt to complete transfers with supervision-met  Long term goal 3: Pt to ambulate 50ft with LRD and supervision-met  Long term goal 4: Pt to manage 2 steps with B HR and Supervision-SBA d/t fatigue and safety  Long term goal 5: Pt to complete TUG within 40sec to demonstrate improved functional mobility/balance-met 29sec on 8/16  OT:Eating  Assistance Needed: Independent  CARE Score: 6  Discharge Goal: Independent, Oral Hygiene  Assistance Needed: Independent  CARE Score: 6  Discharge Goal: Independent, [address] needed: Independent  CARE Score: 6  Discharge Goal: Independent, Shower/Bathe Self  Assistance Needed: Supervision or touching assistance  CARE Score: 4  Discharge Goal: Independent  Upper Body Dressing  Assistance Needed: Setup or clean-up assistance  CARE Score: 5  Discharge Goal: Independent, Lower Body Dressing  Assistance Needed: Partial/moderate assistance  CARE Score: 3  Discharge Goal: Independent, Putting On/Taking Off Footwear  Assistance Needed: Dependent  CARE Score: 1  Discharge Goal: Independent, Toilet Transfer  Assistance needed: Supervision or touching assistance  CARE Score: 4  Discharge Goal: Independent  SP:               From a cognitive standpoint they will need:        24 hr supervision  --progress to occasional            Significant problems/ barriers to functional progress include: Pt is at a high risk for functional loss,      [x]  Acute infection/UTI    []  Low BP's     []  COPD flare-up   []  Uncontrolled blood sugar     []  Progressive anemia     [x]  poor endurance    [x]  Severe pain exacerbation     [x]  Impaired mental status-mild    []  Urinary incontinence    []  Bowel incontinence           Plan to correct barriers to functional progress: Add scheduled rest breaks, CoQ 10 and Vit B 12, control pain by using ice Lidoderm rest and massage as well as pain medications prior to therapy. Spread therapy of 15 hours out over a 7 day window to accommodate rest breaks and medical interventions. Patient seems to be making fair to good response to these interventions. Based on a comprehensive evaluation of the above, the individualized therapy and Discharge plan will be:    -Times stated are an average that will be varied based on the patient's daily need.        PT    1 1/2  hrs/day 5-7 days per week      OT    1 1/2 hrs per day 5-7 days per week     ST     1/2    hrs /day 3-5 days per week       Estimated LOS   2 week(s)    - Overall functional prognosis:     [x]  Good    []  Fair    []  Poor -Medical Prognosis:   [x]  Good    []  Fair    []  Poor    This patient was made aware of the discussion of Plan of Care, their projected dicharge date and their projected function at discharge.          Marie Shirley, DO

## 2022-08-19 ENCOUNTER — OFFICE VISIT (OUTPATIENT)
Dept: GERIATRIC MEDICINE | Age: 87
End: 2022-08-19
Payer: MEDICARE

## 2022-08-19 DIAGNOSIS — R42 VERTIGO: ICD-10-CM

## 2022-08-19 DIAGNOSIS — K65.1 ABSCESS OF ABDOMINAL CAVITY (HCC): ICD-10-CM

## 2022-08-19 DIAGNOSIS — R10.31 RIGHT LOWER QUADRANT ABDOMINAL PAIN: ICD-10-CM

## 2022-08-19 DIAGNOSIS — M62.81 MUSCLE WEAKNESS (GENERALIZED): ICD-10-CM

## 2022-08-19 DIAGNOSIS — R26.2 DIFFICULTY IN WALKING: Primary | ICD-10-CM

## 2022-08-19 DIAGNOSIS — I48.0 PAROXYSMAL ATRIAL FIBRILLATION (HCC): ICD-10-CM

## 2022-08-19 PROCEDURE — 99309 SBSQ NF CARE MODERATE MDM 30: CPT | Performed by: NURSE PRACTITIONER

## 2022-08-19 PROCEDURE — 1123F ACP DISCUSS/DSCN MKR DOCD: CPT | Performed by: NURSE PRACTITIONER

## 2022-08-19 NOTE — PROGRESS NOTES
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    Past Medical History:   Diagnosis Date    Ascending aortic aneurysm ([facility] 2017    Charcot Arleen Tooth muscular atrophy     CHF (congestive heart failure) (HCC)     Chronic diastolic CHF (congestive heart failure) ([facility] 2022    Depression     Descending aortic aneurysm ([facility] 2017    Essential hypertension 2018    Headache     HTN (hypertension)     Impaired mobility and activities of daily living     Lumbar stenosis with neurogenic claudication     Myelopathy ([facility]     Osteoarthritis      Past Surgical History:   Procedure Laterality Date    JOINT REPLACEMENT Bilateral     knees    OTHER SURGICAL HISTORY Right 2022    cat scan guided abdominal mass aspiration with drain placement by Dr. Britt Labs Left 2021    LEFT PLEURAL CATHETER INSERTION performed by Jorge Jensen MD at [place] [id number] Left 2021    total of 755 cc removed per Dr Anderson Estimable specimen sent to lab       Precautions:   Restrictions/Precautions: Fall Risk  Other position/activity restrictions: Abdominal drain     Social/Functional History:  Social/Functional History  Lives With: Alone  Type of Home: House  Home Layout: One level  Home Access: Stairs to enter with rails  Entrance Stairs - Number of Steps: 2  Entrance Stairs - Rails: Both  Bathroom Shower/Tub: Tub/Shower unit  Bathroom Equipment: Grab bars in shower  Home Equipment: [employer], quad  Has the patient had two or more falls in the past year or any fall with injury in the past year?: No  Receives Help From: Family  ADL Assistance: [facility]: Independent  Homemaking Responsibilities: Yes  Meal Prep Responsibility: Primary  Laundry Responsibility: Primary  Cleaning Responsibility: Primary  Shopping Responsibility: No (Son performs)  Ambulation Assistance: Independent ([employer] and furniture walking in the home; Pt only wears AFO's when ambulating outside with QC. Inside she is always barefoot)  Transfer Assistance: Independent  Active : No  Patient's  Info: family - son lives close by; dtr lives in [place]  Additional Comments: discharging to SNF    Current Functional Status:  ADL  Feeding: Independent  Grooming: Modified independent   UE Bathing: Supervision  LE Bathing: Supervision  UE Dressing: Setup  LE Dressing: Moderate assistance  Toileting: Modified independent   Toilet Transfers  Toilet - Technique: Ambulating  Equipment Used: Grab bars  Toilet Transfer: Stand by assistance  Toilet Transfers Comments: ww  Tub Transfers  Tub Transfers: Not tested  Shower Transfers  Shower - Transfer From: [facility] - Transfer Type: To and From  Shower - Transfer To: Shower seat with back  Shower - Technique: Ambulating  Shower Transfers: Contact Guard  Shower Transfers Comments: christinab bars    Orientation Status:  Orientation  Overall Orientation Status: Within Functional Limits    Cognition Status:  Cognition  Overall Cognitive Status: WFL  Arousal/Alertness: Appropriate responses to stimuli  Following Commands:  Follows one step commands with increased time  Attention Span: Appears intact  Memory: Appears intact  Safety Judgement: Decreased awareness of need for safety  Problem Solving: Good awareness of errors made  Insights: Fully aware of deficits  Initiation: Requires cues for some  Sequencing: Requires cues for some  Cognition Comment: Comp Mod I, expression Mod I, social Ind, problem Sup, Memory Sup    Perception Status:  Perception  Overall Perceptual Status: WFL    Sensation Status:  Sensation  Overall Sensation Status: Impaired  Light Touch: Partial deficits in the LLE, Partial deficits in the RLE    Vision and

## 2022-08-19 NOTE — PROGRESS NOTES
Facility/Department: [facility]  Physical Therapy Acute Rehab Discharge Summary  [facility]    NAME: Roby Aburto  : 1932  MRN: [id number]    Admission Date: 8/3/2022  1:38 PM  Discharge Date: 2022    Rehab Diagnosis(es): 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.  [facility] rehab admit 8/3/2022  Patient Active Problem List    Diagnosis Date Noted    A-fib [facility])     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     Change or removal of drains 2022    Adnexal mass 2022    Abscess of abdominal cavity ([address] 2022    Intra-abdominal abscess ([address] 2022    Aortic thrombus ([address] 2022    Abscess 2022    Hx of drainage of abscess 2022    Abdominal pain 2022    Streptococcus viridans infection 2022    Abdominal mass 2022    Right lower quadrant abdominal pain 2022    Hyponatremia 2022    Hypokalemia 2022    Anemia 2022    CHF (congestive heart failure) ([address] 2022    Hypothyroid 2022    Central retinal vein occlusion, left eye, stable 2021    Lumbar stenosis with neurogenic claudication 2016    Chronic diastolic CHF (congestive heart failure) ([address] 2022    Acute cystitis with hematuria 2021    Acute on chronic combined systolic (congestive) and diastolic (congestive) heart failure ([address] 2021    Pleural effusion 2021    Hypoxia     Acute pulmonary edema (HCC)     Gait abnormality 2021    Acute on chronic combined systolic and diastolic CHF (congestive heart failure) ([address] 2021    Essential hypertension 2018    Shortness of breath     Vertigo     Thoracic aortic aneurysm without rupture ([address] 2017    Descending aortic aneurysm ([address] 2017    Osteoarthritis     Myelopathy ([address]     Headache     Depression     Acquired scoliosis 2016    Osteoporosis 2016    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    Excess weight 06/02/2016    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/02/2016       Past Medical History:   Diagnosis Date    Ascending aortic [facility] ([facility] 6/23/2017    Charcot Arleen Tooth muscular atrophy     CHF (congestive heart failure) (HCC)     Chronic diastolic CHF (congestive heart failure) ([address] 4/1/2022    Depression     Descending aortic aneurysm ([facility] [facility] 6/23/2017    Essential hypertension 2/16/2018    Headache     HTN (hypertension)     Impaired mobility and activities of daily living     Lumbar stenosis with neurogenic claudication     Myelopathy ([facility]     Osteoarthritis      Past Surgical History:   Procedure Laterality Date    JOINT REPLACEMENT Bilateral     knees    OTHER SURGICAL HISTORY Right 07/21/2022    cat scan guided abdominal mass aspiration with drain placement by Dr. Ortiz Reach Left 02/25/2021    LEFT PLEURAL CATHETER INSERTION performed by Luis Fang MD at [place] [id number] Left 01/29/2021    total of 755 cc removed per Dr Varner Home specimen sent to lab       Indications for Skilled Intervention: Decreased functional mobility , Decreased safe awareness, Decreased endurance, Decreased balance    GOALS:  Long Term Goals  Long term goal 1: Pt to complete bed mobility with indep - MET  Long term goal 2: Pt to complete transfers with indep - MET  Long term goal 3: Pt to ambulate 50ft with LRD and indep - NOT MET (completes with supervision)  Long term goal 4: Pt to manage 2 steps with B HR and Supervision - Completes with SBA/Supervision  Long term goal 5: Pt to complete TUG within 20sec to demonstrate improved functional mobility/balance - NOT MET: completes in 29sec    Summary of POC: Throughout rehab stay, pt received skilled physical therapy treatment 1.5 hours daily for 2 weeks.     Skilled Services Provided: Strengthening, ROM, Balance training, Functional mobility training, Transfer training, Endurance training, Gait training, Stair training, Cognitive reorientation, Patient/Caregiver education & training, Safety education & training, Equipment evaluation, education, & procurement, Therapeutic activities, Home exercise program, Neuromuscular re-education, ADL/Self-care training, Positioning, Modalities (Please refer to daily notes for all treatment details)    ASSESSMENT: Pt struggles to manage indep mobility d/t inconsistencies in ambulatory pattern and decreased safety awareness. Team decision to DC to SNF d/t lack of 24/7 support at home and need for IV medications upon DC. Discharge Plan: DC to [facility] for continued therapy and medical care.     Dale Goldstein, PT, 08/19/22 at 8:36 AM

## 2022-08-22 ENCOUNTER — OFFICE VISIT (OUTPATIENT)
Dept: GERIATRIC MEDICINE | Age: 87
End: 2022-08-22
Payer: MEDICARE

## 2022-08-22 DIAGNOSIS — I74.10 AORTIC THROMBUS (HCC): ICD-10-CM

## 2022-08-22 DIAGNOSIS — Z78.9 IMPAIRED MOBILITY AND ACTIVITIES OF DAILY LIVING: ICD-10-CM

## 2022-08-22 DIAGNOSIS — K65.1 INTRA-ABDOMINAL ABSCESS (HCC): Primary | ICD-10-CM

## 2022-08-22 DIAGNOSIS — Z74.09 IMPAIRED MOBILITY AND ACTIVITIES OF DAILY LIVING: ICD-10-CM

## 2022-08-22 DIAGNOSIS — I50.32 CHRONIC DIASTOLIC CHF (CONGESTIVE HEART FAILURE) (HCC): ICD-10-CM

## 2022-08-22 DIAGNOSIS — G95.9 MYELOPATHY (HCC): ICD-10-CM

## 2022-08-22 LAB
ANION GAP SERPL CALCULATED.3IONS-SCNC: 9 MEQ/L (ref 9–15)
BUN BLDV-MCNC: 12 MG/DL (ref 8–23)
CALCIUM SERPL-MCNC: 8.6 MG/DL (ref 8.5–9.9)
CHLORIDE BLD-SCNC: 104 MEQ/L (ref 95–107)
CO2: 26 MEQ/L (ref 20–31)
CREAT SERPL-MCNC: 0.58 MG/DL (ref 0.5–0.9)
CULTURE WOUND: NORMAL
GFR AFRICAN AMERICAN: >60
GFR NON-AFRICAN AMERICAN: >60
GLUCOSE BLD-MCNC: 85 MG/DL (ref 70–99)
HCT VFR BLD CALC: 33.9 % (ref 37–47)
HEMOGLOBIN: 10.9 G/DL (ref 12–16)
MCH RBC QN AUTO: 27.2 PG (ref 27–31.3)
MCHC RBC AUTO-ENTMCNC: 32.2 % (ref 33–37)
MCV RBC AUTO: 84.4 FL (ref 82–100)
PDW BLD-RTO: 16.1 % (ref 11.5–14.5)
PLATELET # BLD: 187 K/UL (ref 130–400)
POTASSIUM SERPL-SCNC: 3.3 MEQ/L (ref 3.4–4.9)
RBC # BLD: 4.01 M/UL (ref 4.2–5.4)
SODIUM BLD-SCNC: 139 MEQ/L (ref 135–144)
WBC # BLD: 6.3 K/UL (ref 4.8–10.8)

## 2022-08-22 PROCEDURE — 99305 1ST NF CARE MODERATE MDM 35: CPT | Performed by: INTERNAL MEDICINE

## 2022-08-22 PROCEDURE — 1123F ACP DISCUSS/DSCN MKR DOCD: CPT | Performed by: INTERNAL MEDICINE

## 2022-08-26 ENCOUNTER — OFFICE VISIT (OUTPATIENT)
Dept: GERIATRIC MEDICINE | Age: 87
End: 2022-08-26
Payer: MEDICARE

## 2022-08-26 DIAGNOSIS — E87.6 HYPOKALEMIA: ICD-10-CM

## 2022-08-26 DIAGNOSIS — R26.2 DIFFICULTY IN WALKING: ICD-10-CM

## 2022-08-26 DIAGNOSIS — K65.1 INTRA-ABDOMINAL ABSCESS (HCC): Primary | ICD-10-CM

## 2022-08-26 DIAGNOSIS — I48.0 PAROXYSMAL ATRIAL FIBRILLATION (HCC): ICD-10-CM

## 2022-08-26 DIAGNOSIS — M62.81 MUSCLE WEAKNESS (GENERALIZED): ICD-10-CM

## 2022-08-26 DIAGNOSIS — I50.32 CHRONIC DIASTOLIC CHF (CONGESTIVE HEART FAILURE) (HCC): ICD-10-CM

## 2022-08-26 LAB
ANION GAP SERPL CALCULATED.3IONS-SCNC: 9 MEQ/L (ref 9–15)
BUN BLDV-MCNC: 12 MG/DL (ref 8–23)
CALCIUM SERPL-MCNC: 8.7 MG/DL (ref 8.5–9.9)
CHLORIDE BLD-SCNC: 103 MEQ/L (ref 95–107)
CO2: 29 MEQ/L (ref 20–31)
CREAT SERPL-MCNC: 0.52 MG/DL (ref 0.5–0.9)
GFR AFRICAN AMERICAN: >60
GFR NON-AFRICAN AMERICAN: >60
GLUCOSE BLD-MCNC: 86 MG/DL (ref 70–99)
HCT VFR BLD CALC: 32.1 % (ref 37–47)
HEMOGLOBIN: 10.7 G/DL (ref 12–16)
INR BLD: 2.4
MCH RBC QN AUTO: 27.5 PG (ref 27–31.3)
MCHC RBC AUTO-ENTMCNC: 33.4 % (ref 33–37)
MCV RBC AUTO: 82.3 FL (ref 82–100)
PDW BLD-RTO: 16.1 % (ref 11.5–14.5)
PLATELET # BLD: 191 K/UL (ref 130–400)
POTASSIUM SERPL-SCNC: 3.2 MEQ/L (ref 3.4–4.9)
PROTHROMBIN TIME: 26 SEC (ref 12.3–14.9)
RBC # BLD: 3.91 M/UL (ref 4.2–5.4)
SODIUM BLD-SCNC: 141 MEQ/L (ref 135–144)
WBC # BLD: 6.1 K/UL (ref 4.8–10.8)

## 2022-08-26 PROCEDURE — 99309 SBSQ NF CARE MODERATE MDM 30: CPT | Performed by: NURSE PRACTITIONER

## 2022-08-26 PROCEDURE — 1123F ACP DISCUSS/DSCN MKR DOCD: CPT | Performed by: NURSE PRACTITIONER

## 2022-08-27 LAB — POTASSIUM SERPL-SCNC: 4.2 MEQ/L (ref 3.4–4.9)

## 2022-08-29 ENCOUNTER — OFFICE VISIT (OUTPATIENT)
Dept: GERIATRIC MEDICINE | Age: 87
End: 2022-08-29
Payer: MEDICARE

## 2022-08-29 DIAGNOSIS — I50.43 ACUTE ON CHRONIC COMBINED SYSTOLIC (CONGESTIVE) AND DIASTOLIC (CONGESTIVE) HEART FAILURE (HCC): Primary | ICD-10-CM

## 2022-08-29 DIAGNOSIS — I48.0 PAROXYSMAL ATRIAL FIBRILLATION (HCC): ICD-10-CM

## 2022-08-29 DIAGNOSIS — G95.9 MYELOPATHY (HCC): ICD-10-CM

## 2022-08-29 PROCEDURE — 99309 SBSQ NF CARE MODERATE MDM 30: CPT | Performed by: INTERNAL MEDICINE

## 2022-08-29 PROCEDURE — 1123F ACP DISCUSS/DSCN MKR DOCD: CPT | Performed by: INTERNAL MEDICINE

## 2022-09-02 ENCOUNTER — OFFICE VISIT (OUTPATIENT)
Dept: INTERVENTIONAL RADIOLOGY/VASCULAR | Age: 87
End: 2022-09-02
Payer: MEDICARE

## 2022-09-02 VITALS
BODY MASS INDEX: 31.05 KG/M2 | SYSTOLIC BLOOD PRESSURE: 123 MMHG | WEIGHT: 159 LBS | HEART RATE: 67 BPM | DIASTOLIC BLOOD PRESSURE: 75 MMHG

## 2022-09-02 DIAGNOSIS — K65.1 ABSCESS OF ABDOMINAL CAVITY (HCC): Primary | ICD-10-CM

## 2022-09-02 DIAGNOSIS — Z98.890 HX OF DRAINAGE OF ABSCESS: ICD-10-CM

## 2022-09-02 DIAGNOSIS — R19.00 ABDOMINAL MASS, UNSPECIFIED ABDOMINAL LOCATION: ICD-10-CM

## 2022-09-02 LAB
ANION GAP SERPL CALCULATED.3IONS-SCNC: 9 MEQ/L (ref 9–15)
BUN BLDV-MCNC: 11 MG/DL (ref 8–23)
CALCIUM SERPL-MCNC: 8.6 MG/DL (ref 8.5–9.9)
CHLORIDE BLD-SCNC: 105 MEQ/L (ref 95–107)
CO2: 28 MEQ/L (ref 20–31)
CREAT SERPL-MCNC: 0.57 MG/DL (ref 0.5–0.9)
GFR AFRICAN AMERICAN: >60
GFR NON-AFRICAN AMERICAN: >60
GLUCOSE BLD-MCNC: 97 MG/DL (ref 70–99)
HCT VFR BLD CALC: 32.1 % (ref 37–47)
HEMOGLOBIN: 10.6 G/DL (ref 12–16)
MCH RBC QN AUTO: 27.2 PG (ref 27–31.3)
MCHC RBC AUTO-ENTMCNC: 33.1 % (ref 33–37)
MCV RBC AUTO: 82.1 FL (ref 82–100)
PDW BLD-RTO: 16.2 % (ref 11.5–14.5)
PLATELET # BLD: 195 K/UL (ref 130–400)
POTASSIUM SERPL-SCNC: 3.5 MEQ/L (ref 3.4–4.9)
RBC # BLD: 3.91 M/UL (ref 4.2–5.4)
SODIUM BLD-SCNC: 142 MEQ/L (ref 135–144)
WBC # BLD: 6 K/UL (ref 4.8–10.8)

## 2022-09-02 PROCEDURE — G8417 CALC BMI ABV UP PARAM F/U: HCPCS | Performed by: NURSE PRACTITIONER

## 2022-09-02 PROCEDURE — 1036F TOBACCO NON-USER: CPT | Performed by: NURSE PRACTITIONER

## 2022-09-02 PROCEDURE — 99213 OFFICE O/P EST LOW 20 MIN: CPT | Performed by: NURSE PRACTITIONER

## 2022-09-02 PROCEDURE — 1123F ACP DISCUSS/DSCN MKR DOCD: CPT | Performed by: NURSE PRACTITIONER

## 2022-09-02 PROCEDURE — 1111F DSCHRG MED/CURRENT MED MERGE: CPT | Performed by: NURSE PRACTITIONER

## 2022-09-02 PROCEDURE — G8427 DOCREV CUR MEDS BY ELIG CLIN: HCPCS | Performed by: NURSE PRACTITIONER

## 2022-09-02 PROCEDURE — 1090F PRES/ABSN URINE INCON ASSESS: CPT | Performed by: NURSE PRACTITIONER

## 2022-09-02 ASSESSMENT — ENCOUNTER SYMPTOMS
NAUSEA: 0
EYES NEGATIVE: 1
SORE THROAT: 0
ABDOMINAL PAIN: 0
VOMITING: 0
DIARRHEA: 0
WHEEZING: 0
TROUBLE SWALLOWING: 0
COLOR CHANGE: 0
RESPIRATORY NEGATIVE: 1
COUGH: 0
BACK PAIN: 0
SHORTNESS OF BREATH: 0
ABDOMINAL DISTENTION: 0

## 2022-09-02 NOTE — PROGRESS NOTES
VASCULAR MEDICINE AND INTERVENTIONAL RADIOLOGY DEPARTMENT:         Hannah Andre, a female of 80 y.o. came to the office 9/2/2022. Chief Complaint   Patient presents with    Follow-up     2 wk f/u       9/2/2022:Daryrl Rodriguez S/P percutaneous placement of right colonic mass with fluid collection abscess drain placement on 7/21/2022 with Dr. Salma Savage. She is here for two week follow up. Here with her son. Currently at [address] [place] [address] for [facility]. Plan for home next week. Per [id number] [facility] staff, catheter continues to drain. Patient denies any abdominal pain. Bowel and bladder function without complications. Denies chest pain. Denies dyspnea. Family History   Problem Relation Age of Onset    Arthritis Mother     Arthritis Father     High Blood Pressure Father        Past Surgical History:   Procedure Laterality Date    JOINT REPLACEMENT Bilateral     knees    OTHER SURGICAL HISTORY Right 07/21/2022    cat scan guided abdominal mass aspiration with drain placement by Dr. Daphne Prado Left 02/25/2021    LEFT PLEURAL CATHETER INSERTION performed by Kelvin Castano MD at [place] [id number] Left 01/29/2021    total of 755 cc removed per Dr Gold Myers specimen sent to lab        Past Medical History:   Diagnosis Date    Ascending aortic aneurysm ([address] 6/23/2017    Charcot Arleen Tooth muscular atrophy     CHF (congestive heart failure) (HCC)     Chronic diastolic CHF (congestive heart failure) ([address] 4/1/2022    Depression     Descending aortic aneurysm ([address] 6/23/2017    Essential hypertension 2/16/2018    Headache     HTN (hypertension)     Impaired mobility and activities of daily living     Lumbar stenosis with neurogenic claudication     Myelopathy ([address]     Osteoarthritis        Social History     Socioeconomic History    Marital status:     Number of children: 2   Tobacco Use    Smoking status: Never    Smokeless tobacco: Never   Substance and Sexual Activity    Alcohol use:  No Alcohol/week: 0.0 standard drinks    Drug use: No   Social History Narrative    , Lives With: Alone, son lives down the street, dtr is in the area    Type of Home: [address]: One level    Home Access: Stairs to enter with rails- Number of Steps: 2- Rails: Both    Bathroom Shower/Tub: Tub/Shower unit, Bathroom Equipment: Grab bars in shower, Shower chair    Home Equipment: Rolling walker, Cane(Pt infrequemtly uses DME for ambulation and prefers to furniture walk in home)    ADL Assistance: Independent, [address] [place] [address] Road: [place] [address] Responsibilities: Yes    Ambulation Assistance: Independent, Transfer Assistance: Independent    Additional Comments: Son stops over 2 times daily           Allergies   Allergen Reactions    Latex     Tape [Adhesive Tape]        Current Outpatient Medications on File Prior to Visit   Medication Sig Dispense Refill    Vitamin D (CHOLECALCIFEROL) 50 MCG (2000 UT) TABS tablet Take 1 tablet by mouth Daily with supper (Patient taking differently: Take 2,000 Units by mouth Daily with supper Indications: Nutritional Support) 60 tablet 5    warfarin (COUMADIN) 5 MG tablet Take 1 tablet by mouth daily (Patient taking differently: Take 5 mg by mouth every evening Indications: Atrial Fibrillation) 30 tablet 3    piperacillin-tazobactam (ZOSYN) infusion Infuse 3.375 g intravenously in the morning and 3.375 g at noon and 3.375 g in the evening and 3.375 g before bedtime.  CBC BMP weekly  CRP in 3 weeks  Fax results to [phone]  Follow-up with Dr. Frederick Ray in 4 weeks at [phone]. 405 g 0    potassium chloride (KLOR-CON M) 20 MEQ extended release tablet Take 20 mEq by mouth daily (with breakfast) Indications: Nutritional Support      furosemide (LASIX) 20 MG tablet TAKE 1 TABLET DAILY (Patient taking differently: Take 20 mg by mouth daily Indications: Congestive Heart Failure) 90 tablet 3    [DISCONTINUED] digoxin (LANOXIN) 125 MCG tablet TAKE 1 TABLET DAILY 90 tablet 3    [DISCONTINUED] amLODIPine (NORVASC) 5 MG tablet Take 1 tablet by mouth daily 90 tablet 3    pantoprazole (PROTONIX) 40 MG tablet TAKE 1 TABLET DAILY (Patient taking differently: Take 40 mg by mouth daily Indications: Ulcer) 90 tablet 3    carvedilol (COREG) 6.25 MG tablet TAKE 1 TABLET TWICE A DAY (Patient taking differently: Take 6.25 mg by mouth 2 times daily Indications: High Blood Pressure Disorder) 180 tablet 3    [DISCONTINUED] XARELTO 15 MG TABS tablet TAKE 1 TABLET DAILY 90 tablet 3    nitroGLYCERIN (NITROSTAT) 0.4 MG SL tablet up to max of 3 total doses. If no relief after 1 dose, call 911. (Patient taking differently: Place 0.4 mg under the tongue every 5 minutes as needed Indications: Chest Pain up to max of 3 total doses. If no relief after 1 dose, call 911.) 25 tablet 3    budesonide-formoterol (SYMBICORT) 160-4.5 MCG/ACT AERO Inhale 2 puffs into the lungs 2 times daily (Patient taking differently: Inhale 2 puffs into the lungs in the morning and 2 puffs in the evening.  Indications: Difficulty Breathing.) 1 Inhaler 3    ferrous sulfate (IRON 325) 325 (65 Fe) MG tablet Take 1 tablet by mouth 2 times daily (with meals) (Patient taking differently: Take 325 mg by mouth 2 times daily (with meals) Indications: Anemia) 30 tablet 3    Carboxymethylcellulose Sodium (EYE DROPS OP) Apply 1 drop to eye as needed (Dry eyes) Indications: Systane       Boswellia-Glucosamine-Vit D (OSTEO BI-FLEX ONE PER DAY) TABS Take 1 tablet by mouth daily Indications: Nutritional Support      Multiple Vitamins-Minerals (CENTRUM SILVER ULTRA WOMENS) TABS Take 1 tablet by mouth daily Indications: Nutritional Support      vitamin B-12 (CYANOCOBALAMIN) 1000 MCG tablet Take 1,000 mcg by mouth daily Indications: Nutritional Support      citalopram (CELEXA) 20 MG tablet Take 20 mg by mouth daily Indications: Depression      SYNTHROID 88 MCG tablet Take 88 mcg by mouth daily Indications: Underactive rare possibility of excessive hemorrhage, infection, injury to the    adjacent organs were discussed and the patient verbalized understanding. Pre-procedure evaluation confirmed that the patient was an appropriate candidate for conscious    sedation. Adequate sedation was maintained during the entire procedure. Vital signs, pulse    oximetry, and response to verbal commands were monitored and recorded by the nurse throughout the    procedure and the recovery period. Medical information was entered in the medical record including the    medications and dosages used. The patient returned to baseline neurologic and physiologic status    prior to leaving the department. No immediate sedation related complications were noted. Medication    for conscious sedation was administered via IV route. 45 minutes of conscious sedation was    provided. Following universal protocol, patient and site verification was performed with a \"timeout\" prior to the procedure. The patient was placed on the CT table in supine  position and the right lateral abdomen area was prepped and draped in usual sterile fashion. Using the usual sterile conditions, lidocaine and CT guidance, the fluid collection within soft tissue density    was accessed using a Yueh needle. After confirmation of appropriate localization of the needle, aspiration yielded a thick red to yellow fluid which was sent for microbiology and cytology analysis. Following that an Amplatz wire was placed through the    Yueh sheath into the abscess under CT guidance. The tract was serially dilated with 6, 8, and 10 [place] dilators respectively. Following that a 10 Kiswahili drainage catheter was advanced over the wire into the abscess under CT guidance and the anchoring    loop was formed. Catheter was sutured to the skin and and secured with Percufix device. The patient tolerated the procedure well. No immediate complications identified.   The patient left the CT suite in supine position to the recovery room in stable    condition. Total local anesthetic used: lidocaine, approximately 15 mL. Estimated blood loss:  None. 8/9/2022:   Impression   1. THERE IS A THICK WALLED PERIPHERALLY ENHANCING complex SOFT TISSUE COLLECTION WITH AIR AS WELL AS A DRAINAGE CATHETER SUBJACENT TO THE PROXIMAL PORTION OF THE ASCENDING COLON, CECUM. LIKELY THAT OF THE ABSCESS. IT HAS SHOWN SOME INTERVAL DECREASE IN    SIZE as COMPARED TO THE PRIOR EXAMINATION. 2. LARGE CYSTIC ADNEXAL MASS. UNCHANGED. THE GALLBLADDER NEOPLASM IS NOT EXCLUDED. CONTINUED FURTHER EVALUATION    3. SMALL LEFT PLEURAL EFFUSION. OTHER FINDINGS DETAILED ABOVE           OBJECTIVE:  /75   Pulse 67   Wt 159 lb (72.1 kg)   BMI 31.05 kg/m²     Physical Exam  Constitutional:       General: She is not in acute distress. Appearance: Normal appearance. She is not ill-appearing. HENT:      Head: Normocephalic. Nose: No congestion. Cardiovascular:      Rate and Rhythm: Normal rate. Heart sounds: Normal heart sounds. Pulmonary:      Effort: Pulmonary effort is normal.   Abdominal:      General: Bowel sounds are normal.      Palpations: Abdomen is soft. Comments: RLQ abscess drain with small amount pink/tan fluid    Musculoskeletal:         General: Normal range of motion. Cervical back: Normal range of motion. Right lower leg: No edema. Left lower leg: No edema. Skin:     General: Skin is warm and dry. Neurological:      Mental Status: She is alert and oriented to person, place, and time. Psychiatric:         Mood and Affect: Mood normal.         Behavior: Behavior normal.         ASSESSMENT AND PLAN:    Above Drain procedure and follow up abdominal CT scan reviewed. CT scan shows fluid collection with drain is slowly resolving. Stayfix dressing removed and new placed in office. Patient tolerated well. Suture intact. No s/sx infection to site. Diagnosis Orders   1. Abscess of abdominal cavity ([address]        2. Hx of drainage of abscess        3. Abdominal mass, unspecified abdominal location               Plan:     No orders of the defined types were placed in this encounter. No orders of the defined types were placed in this encounter. --  return two weeks follow up.   --  Will obtain abdominal/pelvic CT once no further drainage. If CT shows fluid collection is resolved, will remove drain. This discussed with patient and son. Total time spent for this encounter:  20  minutes.     ADRIENNE Menon - CNP

## 2022-09-03 NOTE — DISCHARGE SUMMARY
Hospital Medicine Discharge Summary    Venkata Nelson  :  1932  MRN:  [id number]    Admit date:  2022  Discharge date:  22    Admitting Physician: Ivonne Foley MD  Primary Care Physician:  Vince Lipscomb MD      Discharge Diagnoses:    Abdominal pain    Chief Complaint   Patient presents with    Abdominal Pain     Hospital Course:   Patient presetned with abdominal pain secondary to either a mass or abscess. Initial results indicated possible infection; she is discharged to acute rehab where she will be followed by ID for antibiotic management and oncology./    Patient was seen by the following consultants   Consults:  IP CONSULT TO GENERAL SURGERY  IP CONSULT TO INTERVENTIONAL RADIOLOGY  IP CONSULT TO CARDIOLOGY  IP CONSULT TO ONCOLOGY  IP CONSULT TO PALLIATIVE CARE  IP CONSULT TO INFECTIOUS DISEASES  IP CONSULT TO REHAB/TCU ADMISSION COORDINATOR    Significant Diagnostic Studies:    Refer to chart     Please refer to chart if no studies are shown here    No results found. Discharge Medications:         Medication List        ASK your doctor about these medications      amLODIPine 5 MG tablet  Commonly known as: NORVASC  Take 1 tablet by mouth daily     aspirin 81 MG tablet     budesonide-formoterol 160-4.5 MCG/ACT Aero  Commonly known as: SYMBICORT  Inhale 2 puffs into the lungs 2 times daily     carvedilol 6.25 MG tablet  Commonly known as: COREG  TAKE 1 TABLET TWICE A DAY     Centrum Silver Ultra Womens Tabs     citalopram 20 MG tablet  Commonly known as: CELEXA     digoxin 125 MCG tablet  Commonly known as: LANOXIN  TAKE 1 TABLET DAILY     EYE DROPS OP     ferrous sulfate 325 (65 Fe) MG tablet  Commonly known as: IRON 325  Take 1 tablet by mouth 2 times daily (with meals)     furosemide 20 MG tablet  Commonly known as: LASIX  TAKE 1 TABLET DAILY     nitroGLYCERIN 0.4 MG SL tablet  Commonly known as: NITROSTAT  up to max of 3 total doses. If no relief after 1 dose, call 911. Osteo Bi-Flex One Per Day Tabs     pantoprazole 40 MG tablet  Commonly known as: PROTONIX  TAKE 1 TABLET DAILY     Synthroid 88 MCG tablet  Generic drug: levothyroxine     vitamin B-12 1000 MCG tablet  Commonly known as: CYANOCOBALAMIN     Xarelto 15 MG Tabs tablet  Generic drug: rivaroxaban  TAKE 1 TABLET DAILY              Disposition:   If discharged to Home, Any [place] [address] needs that were indicated and/or required as been addressed and set up by Social Work. Condition at discharge: good     Activity:     Total time taken for discharging this patient: 40 minutes. Greater than 70% of time was spent focused exclusively on this patient. Time was taken to review chart, discuss plans with consultants, reconciling medications, discussing plan answering questions with patient.      Signed:  Rosebud Schlatter, MD  9/3/2022, 2:49 PM  ----------------------------------------------------------------------------------------------------------------------    Murphy Diez

## 2022-09-08 ASSESSMENT — ENCOUNTER SYMPTOMS
SHORTNESS OF BREATH: 1
CONSTIPATION: 1
BACK PAIN: 1

## 2022-09-08 NOTE — PROGRESS NOTES
Patient Name: Talya oFntana      YOB: 1932  Medical Record Number: [id number]      History of Present Illness: This 70-year-old woman was been here after recent hospitalization. Patient has been hospitalized with functional weakness patient has a prior neuropathy patient did have atrial fibrillation. Patient had evidence on imaging of a aortic aneurysm with new mural thrombus patient was started course of anticoagulation. Seen by cardiology. Patient additionally had evidence of a right lower quadrant abscess patient had interventional radiology evaluation started course of antibiotic therapy patient had strep. And from that site. Patient was stabilized she is under course physical therapy outpatient with a goal of maximizing functional status. Patient seen today in her room pleasant overall functionally weak. Review of Systems   Constitutional:  Positive for fatigue. Negative for activity change. HENT:  Negative for congestion. Respiratory:  Positive for shortness of breath. Cardiovascular:  Positive for leg swelling. Negative for chest pain. Gastrointestinal:  Positive for constipation. Musculoskeletal:  Positive for arthralgias, back pain and joint swelling. Psychiatric/Behavioral:  Positive for confusion. All other systems reviewed and are negative.     Review of Systems: All 14 review of systems negative other than as stated above    Social History     Tobacco Use    Smoking status: Never    Smokeless tobacco: Never   Substance Use Topics    Alcohol use: No     Alcohol/week: 0.0 standard drinks    Drug use: No         Past Medical History:   Diagnosis Date    Ascending aortic aneurysm ([facility] 6/23/2017    Charcot Arleen Tooth muscular atrophy     CHF (congestive heart failure) (HCC)     Chronic diastolic CHF (congestive heart failure) ([facility] 4/1/2022    Depression     Descending aortic aneurysm ([facility] 6/23/2017    Essential hypertension 2/16/2018    Headache     HTN (hypertension)     Impaired mobility and activities of daily living     Lumbar stenosis with neurogenic claudication     Myelopathy (HCC)     Osteoarthritis            Past Surgical History:   Procedure Laterality Date    JOINT REPLACEMENT Bilateral     knees    OTHER SURGICAL HISTORY Right 07/21/2022    cat scan guided abdominal mass aspiration with drain placement by Dr. Gunderson Pel Left 02/25/2021    LEFT PLEURAL CATHETER INSERTION performed by Alyce Winter MD at [place] [id number] Left 01/29/2021    total of 755 cc removed per Dr Evan Castle specimen sent to lab         Current Outpatient Medications on File Prior to Visit   Medication Sig Dispense Refill    Vitamin D (CHOLECALCIFEROL) 50 MCG (2000 UT) TABS tablet Take 1 tablet by mouth Daily with supper (Patient taking differently: Take 2,000 Units by mouth Daily with supper Indications: Nutritional Support) 60 tablet 5    warfarin (COUMADIN) 5 MG tablet Take 1 tablet by mouth daily (Patient taking differently: Take 5 mg by mouth every evening Indications: Atrial Fibrillation) 30 tablet 3    piperacillin-tazobactam (ZOSYN) infusion Infuse 3.375 g intravenously in the morning and 3.375 g at noon and 3.375 g in the evening and 3.375 g before bedtime.  CBC BMP weekly  CRP in 3 weeks  Fax results to [phone]  Follow-up with Dr. Luke Vasquez in 4 weeks at [phone]. 405 g 0    potassium chloride (KLOR-CON M) 20 MEQ extended release tablet Take 20 mEq by mouth daily (with breakfast) Indications: Nutritional Support      furosemide (LASIX) 20 MG tablet TAKE 1 TABLET DAILY (Patient taking differently: Take 20 mg by mouth daily Indications: Congestive Heart Failure) 90 tablet 3    [DISCONTINUED] digoxin (LANOXIN) 125 MCG tablet TAKE 1 TABLET DAILY 90 tablet 3    [DISCONTINUED] amLODIPine (NORVASC) 5 MG tablet Take 1 tablet by mouth daily 90 tablet 3    pantoprazole (PROTONIX) 40 MG tablet TAKE 1 TABLET DAILY (Patient taking differently: Take 40 mg by mouth daily Indications: Ulcer) 90 tablet 3    carvedilol (COREG) 6.25 MG tablet TAKE 1 TABLET TWICE A DAY (Patient taking differently: Take 6.25 mg by mouth 2 times daily Indications: High Blood Pressure Disorder) 180 tablet 3    [DISCONTINUED] XARELTO 15 MG TABS tablet TAKE 1 TABLET DAILY 90 tablet 3    nitroGLYCERIN (NITROSTAT) 0.4 MG SL tablet up to max of 3 total doses. If no relief after 1 dose, call 911. (Patient taking differently: Place 0.4 mg under the tongue every 5 minutes as needed Indications: Chest Pain up to max of 3 total doses. If no relief after 1 dose, call 911.) 25 tablet 3    budesonide-formoterol (SYMBICORT) 160-4.5 MCG/ACT AERO Inhale 2 puffs into the lungs 2 times daily (Patient taking differently: Inhale 2 puffs into the lungs in the morning and 2 puffs in the evening. Indications: Difficulty Breathing.) 1 Inhaler 3    ferrous sulfate (IRON 325) 325 (65 Fe) MG tablet Take 1 tablet by mouth 2 times daily (with meals) (Patient taking differently: Take 325 mg by mouth 2 times daily (with meals) Indications: Anemia) 30 tablet 3    Carboxymethylcellulose Sodium (EYE DROPS OP) Apply 1 drop to eye as needed (Dry eyes) Indications: Systane       Boswellia-Glucosamine-Vit D (OSTEO BI-FLEX ONE PER DAY) TABS Take 1 tablet by mouth daily Indications: Nutritional Support      Multiple Vitamins-Minerals (CENTRUM SILVER ULTRA WOMENS) TABS Take 1 tablet by mouth daily Indications: Nutritional Support      vitamin B-12 (CYANOCOBALAMIN) 1000 MCG tablet Take 1,000 mcg by mouth daily Indications: Nutritional Support      citalopram (CELEXA) 20 MG tablet Take 20 mg by mouth daily Indications: Depression      SYNTHROID 88 MCG tablet Take 88 mcg by mouth daily Indications: Underactive Thyroid       No current facility-administered medications on file prior to visit.        Allergies   Allergen Reactions    Latex     Tape Nati Chandan Tape]          Family History   Problem Relation Age of Onset    Arthritis Mother Arthritis Father     High Blood Pressure Father          Physical Exam:      Physical Exam  Vitals and nursing note reviewed. Constitutional:       Appearance: Normal appearance. She is obese. HENT:      Head: Normocephalic and atraumatic. Nose: Nose normal.      Mouth/Throat:      Mouth: Mucous membranes are moist.   Eyes:      Extraocular Movements: Extraocular movements intact. Cardiovascular:      Rate and Rhythm: Normal rate and regular rhythm. Pulses: Normal pulses. Pulmonary:      Effort: Pulmonary effort is normal.      Breath sounds: No wheezing. Abdominal:      General: Bowel sounds are normal.      Palpations: Abdomen is soft. Tenderness: There is no abdominal tenderness. Musculoskeletal:         General: Swelling present. Cervical back: Neck supple. Skin:     General: Skin is warm. Findings: Bruising present. Neurological:      Motor: Weakness present. Psychiatric:         Mood and Affect: Mood normal.       There were no vitals taken for this visit.       .   Lab Results   Component Value Date    WBC 6.0 09/02/2022    HGB 10.6 (L) 09/02/2022    HCT 32.1 (L) 09/02/2022    MCV 82.1 09/02/2022     09/02/2022     Lab Results   Component Value Date/Time     09/02/2022 06:20 AM    K 3.5 09/02/2022 06:20 AM    K 3.2 07/24/2022 05:00 AM     09/02/2022 06:20 AM    CO2 28 09/02/2022 06:20 AM    BUN 11 09/02/2022 06:20 AM    CREATININE 0.57 09/02/2022 06:20 AM    GLUCOSE 97 09/02/2022 06:20 AM    CALCIUM 8.6 09/02/2022 06:20 AM                ASSESSMENT:  Patient Active Problem List   Diagnosis    Lumbar stenosis with neurogenic claudication    Acquired scoliosis    Osteoporosis    Charcot Hardik Creamer Tooth muscular atrophy    Excess weight    Osteoarthritis of lumbar spine with myelopathy    Osteoarthritis    Myelopathy (HCC)    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    Headache    Depression    Thoracic aortic aneurysm without rupture (HCC)    Descending aortic aneurysm (HCC)    Vertigo    Shortness of breath    Essential hypertension    Acute on chronic combined systolic and diastolic CHF (congestive heart failure) (HCC)    Gait abnormality    A-fib (HCC)    Pleural effusion    Hypoxia    Acute pulmonary edema (HCC)    Acute on chronic combined systolic (congestive) and diastolic (congestive) heart failure (HCC)    Acute cystitis with hematuria    Chronic diastolic CHF (congestive heart failure) (HCC)    Right lower quadrant abdominal pain    Hyponatremia    Hypokalemia    Anemia    CHF (congestive heart failure) (HCC)    Hypothyroid    Abdominal mass    Central retinal vein occlusion, left eye, stable    Hx of drainage of abscess    Abdominal pain    Streptococcus viridans infection    Aortic thrombus (HCC)    Abscess    Intra-abdominal abscess ([address]    Abscess of abdominal cavity (HCC)    Adnexal mass    Change or removal of drains         PLAN:   Diagnosis Orders   1. Intra-abdominal abscess ([address]        2. Myelopathy ([address]        3. Aortic thrombus ([address]        4. Chronic diastolic CHF (congestive heart failure) ([address]        5. 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 No recent headaches fevers chills blood pressure stable monitor for signs of new embolization pain-free at this time. Continue attritional support skin surveillance. Follow-up CBC BMP pending. Monitoring fever curve    Please note orders entered on site at facility after discussion with appropriate facility nursing/therapy/ / nutritional staff. Current longstanding medical problems and acute medical issues addressed with staff. Available data and data elements in on site paper chart reviewed and analyzed. Current external consultant notes reviewed in on site chart.  Ordered

## 2022-09-14 ENCOUNTER — HOSPITAL ENCOUNTER (EMERGENCY)
Age: 87
Discharge: HOME | End: 2022-09-14
Attending: EMERGENCY MEDICINE
Payer: MEDICARE

## 2022-09-14 ENCOUNTER — OFFICE VISIT (OUTPATIENT)
Dept: INFECTIOUS DISEASES | Age: 87
End: 2022-09-14
Payer: MEDICARE

## 2022-09-14 ENCOUNTER — TELEPHONE (OUTPATIENT)
Dept: OTHER | Facility: CLINIC | Age: 87
End: 2022-09-14

## 2022-09-14 ENCOUNTER — APPOINTMENT (OUTPATIENT)
Dept: CT IMAGING | Age: 87
End: 2022-09-14
Payer: MEDICARE

## 2022-09-14 VITALS
WEIGHT: 155 LBS | DIASTOLIC BLOOD PRESSURE: 64 MMHG | BODY MASS INDEX: 28.52 KG/M2 | HEIGHT: 62 IN | TEMPERATURE: 98.6 F | RESPIRATION RATE: 16 BRPM | SYSTOLIC BLOOD PRESSURE: 105 MMHG | OXYGEN SATURATION: 99 % | HEART RATE: 85 BPM

## 2022-09-14 VITALS — DIASTOLIC BLOOD PRESSURE: 74 MMHG | SYSTOLIC BLOOD PRESSURE: 112 MMHG | TEMPERATURE: 96.8 F | HEART RATE: 80 BPM

## 2022-09-14 DIAGNOSIS — A49.1 STREPTOCOCCUS VIRIDANS INFECTION: ICD-10-CM

## 2022-09-14 DIAGNOSIS — K65.1 INTRA-ABDOMINAL ABSCESS (HCC): Primary | ICD-10-CM

## 2022-09-14 DIAGNOSIS — R10.31 RIGHT LOWER QUADRANT ABDOMINAL PAIN: ICD-10-CM

## 2022-09-14 DIAGNOSIS — N94.89 ADNEXAL MASS: ICD-10-CM

## 2022-09-14 LAB
ALBUMIN SERPL-MCNC: 3.2 G/DL (ref 3.5–4.6)
ALP BLD-CCNC: 57 U/L (ref 40–130)
ALT SERPL-CCNC: 13 U/L (ref 0–33)
ANION GAP SERPL CALCULATED.3IONS-SCNC: 8 MEQ/L (ref 9–15)
AST SERPL-CCNC: 25 U/L (ref 0–35)
BASOPHILS ABSOLUTE: 0.1 K/UL (ref 0–0.2)
BASOPHILS RELATIVE PERCENT: 1.1 %
BILIRUB SERPL-MCNC: 0.4 MG/DL (ref 0.2–0.7)
BUN BLDV-MCNC: 13 MG/DL (ref 8–23)
CALCIUM SERPL-MCNC: 8.8 MG/DL (ref 8.5–9.9)
CHLORIDE BLD-SCNC: 105 MEQ/L (ref 95–107)
CO2: 27 MEQ/L (ref 20–31)
CREAT SERPL-MCNC: 0.66 MG/DL (ref 0.5–0.9)
EOSINOPHILS ABSOLUTE: 0.2 K/UL (ref 0–0.7)
EOSINOPHILS RELATIVE PERCENT: 2.8 %
GFR AFRICAN AMERICAN: >60
GFR NON-AFRICAN AMERICAN: >60
GLOBULIN: 3.8 G/DL (ref 2.3–3.5)
GLUCOSE BLD-MCNC: 127 MG/DL (ref 70–99)
HCT VFR BLD CALC: 34.9 % (ref 37–47)
HEMOGLOBIN: 11.5 G/DL (ref 12–16)
INR BLD: 2
LACTIC ACID: 1.2 MMOL/L (ref 0.5–2.2)
LYMPHOCYTES ABSOLUTE: 1 K/UL (ref 1–4.8)
LYMPHOCYTES RELATIVE PERCENT: 12.3 %
MCH RBC QN AUTO: 26.9 PG (ref 27–31.3)
MCHC RBC AUTO-ENTMCNC: 33 % (ref 33–37)
MCV RBC AUTO: 81.4 FL (ref 82–100)
MONOCYTES ABSOLUTE: 0.8 K/UL (ref 0.2–0.8)
MONOCYTES RELATIVE PERCENT: 9.4 %
NEUTROPHILS ABSOLUTE: 6 K/UL (ref 1.4–6.5)
NEUTROPHILS RELATIVE PERCENT: 74.4 %
PDW BLD-RTO: 16.2 % (ref 11.5–14.5)
PLATELET # BLD: 223 K/UL (ref 130–400)
POC CREATININE WHOLE BLOOD: 0.7
POTASSIUM SERPL-SCNC: 3.9 MEQ/L (ref 3.4–4.9)
PROTHROMBIN TIME: 22.8 SEC (ref 12.3–14.9)
RBC # BLD: 4.29 M/UL (ref 4.2–5.4)
SODIUM BLD-SCNC: 140 MEQ/L (ref 135–144)
TOTAL PROTEIN: 7 G/DL (ref 6.3–8)
WBC # BLD: 8 K/UL (ref 4.8–10.8)

## 2022-09-14 PROCEDURE — 1111F DSCHRG MED/CURRENT MED MERGE: CPT | Performed by: INTERNAL MEDICINE

## 2022-09-14 PROCEDURE — 36415 COLL VENOUS BLD VENIPUNCTURE: CPT

## 2022-09-14 PROCEDURE — G8427 DOCREV CUR MEDS BY ELIG CLIN: HCPCS | Performed by: INTERNAL MEDICINE

## 2022-09-14 PROCEDURE — 1036F TOBACCO NON-USER: CPT | Performed by: INTERNAL MEDICINE

## 2022-09-14 PROCEDURE — 85610 PROTHROMBIN TIME: CPT

## 2022-09-14 PROCEDURE — 80053 COMPREHEN METABOLIC PANEL: CPT

## 2022-09-14 PROCEDURE — 1123F ACP DISCUSS/DSCN MKR DOCD: CPT | Performed by: INTERNAL MEDICINE

## 2022-09-14 PROCEDURE — G8417 CALC BMI ABV UP PARAM F/U: HCPCS | Performed by: INTERNAL MEDICINE

## 2022-09-14 PROCEDURE — 96365 THER/PROPH/DIAG IV INF INIT: CPT

## 2022-09-14 PROCEDURE — 2580000003 HC RX 258: Performed by: EMERGENCY MEDICINE

## 2022-09-14 PROCEDURE — 87040 BLOOD CULTURE FOR BACTERIA: CPT

## 2022-09-14 PROCEDURE — 1090F PRES/ABSN URINE INCON ASSESS: CPT | Performed by: INTERNAL MEDICINE

## 2022-09-14 PROCEDURE — 6360000004 HC RX CONTRAST MEDICATION: Performed by: EMERGENCY MEDICINE

## 2022-09-14 PROCEDURE — 99214 OFFICE O/P EST MOD 30 MIN: CPT | Performed by: INTERNAL MEDICINE

## 2022-09-14 PROCEDURE — 83605 ASSAY OF LACTIC ACID: CPT

## 2022-09-14 PROCEDURE — 96361 HYDRATE IV INFUSION ADD-ON: CPT

## 2022-09-14 PROCEDURE — 6360000002 HC RX W HCPCS: Performed by: EMERGENCY MEDICINE

## 2022-09-14 PROCEDURE — 99285 EMERGENCY DEPT VISIT HI MDM: CPT

## 2022-09-14 PROCEDURE — 85025 COMPLETE CBC W/AUTO DIFF WBC: CPT

## 2022-09-14 PROCEDURE — 74177 CT ABD & PELVIS W/CONTRAST: CPT

## 2022-09-14 RX ORDER — MECLIZINE HYDROCHLORIDE 25 MG/1
TABLET ORAL
COMMUNITY
Start: 2022-09-12

## 2022-09-14 RX ORDER — ACETAMINOPHEN 325 MG/1
650 TABLET ORAL EVERY 6 HOURS PRN
COMMUNITY

## 2022-09-14 RX ORDER — 0.9 % SODIUM CHLORIDE 0.9 %
500 INTRAVENOUS SOLUTION INTRAVENOUS ONCE
Status: COMPLETED | OUTPATIENT
Start: 2022-09-14 | End: 2022-09-14

## 2022-09-14 RX ADMIN — IOPAMIDOL 50 ML: 612 INJECTION, SOLUTION INTRAVENOUS at 12:01

## 2022-09-14 RX ADMIN — PIPERACILLIN AND TAZOBACTAM 3375 MG: 3; .375 INJECTION, POWDER, FOR SOLUTION INTRAVENOUS at 13:27

## 2022-09-14 RX ADMIN — SODIUM CHLORIDE 500 ML: 900 INJECTION, SOLUTION INTRAVENOUS at 11:13

## 2022-09-14 ASSESSMENT — ENCOUNTER SYMPTOMS
EYE PAIN: 0
CHEST TIGHTNESS: 0
ABDOMINAL PAIN: 1
SORE THROAT: 0
SHORTNESS OF BREATH: 0
VOMITING: 0
NAUSEA: 0

## 2022-09-14 ASSESSMENT — PAIN DESCRIPTION - DESCRIPTORS: DESCRIPTORS: ACHING;SHARP;SORE

## 2022-09-14 ASSESSMENT — PAIN DESCRIPTION - ORIENTATION: ORIENTATION: RIGHT;LOWER

## 2022-09-14 ASSESSMENT — PAIN DESCRIPTION - LOCATION: LOCATION: ABDOMEN

## 2022-09-14 ASSESSMENT — PAIN - FUNCTIONAL ASSESSMENT: PAIN_FUNCTIONAL_ASSESSMENT: 0-10

## 2022-09-14 ASSESSMENT — PAIN DESCRIPTION - PAIN TYPE: TYPE: ACUTE PAIN

## 2022-09-14 ASSESSMENT — PAIN SCALES - GENERAL: PAINLEVEL_OUTOF10: 6

## 2022-09-14 NOTE — ED TRIAGE NOTES
Pt to the ED via [facility] [address] into triage with c/o right sided abdominal pain around a drain that was placed about 1.5 months ago. Pt had abscess around her colon that the drain was placed for. Pt states that her pain is 5-6/10. Pt alert and oriented x 4. Pt was seen at by PCP yesterday and Infectious Disease today and was told to come to ED to be checked out.
no abdominal pain, no bloating, no constipation, no diarrhea, no nausea and no vomiting.

## 2022-09-14 NOTE — ED PROVIDER NOTES
[address] [facility] ED  EMERGENCY DEPARTMENT ENCOUNTER      Pt Name: Lennon Galeazzi  MRN: [id number]  Armssadiegffrancia 7/12/1932  Date of evaluation: 9/14/2022  Provider: Maryann Benz DO    CHIEF COMPLAINT       Chief Complaint   Patient presents with    Abdominal Pain     Pt to the ED via [facility] into triage with c/o right sided abdominal pain around a drain that was placed about 1.5 months ago. Pt had abscess around her colon that the drain was placed for. Pt states that her pain is 5-6/10. Pt alert and oriented x 4. Pt was seen at by PCP yesterday and Infectious Disease today and was told to come to ED to be checked out. HISTORY OF PRESENT ILLNESS   (Location/Symptom, Timing/Onset, Context/Setting, Quality, Duration, Modifying Factors, Severity)  Note limiting factors. Lennon Galeazzi is a 80 y.o. female who presents to the emergency department . Patient sent to the ER by infectious disease. 6 weeks ago she was found to have an adnexal abscess. She had a percutaneous drain placed. She then went to the nursing home and was on IV Zosyn. They stopped the Zosyn 2 weeks ago and she is now home and now she has pain in the area of the drain again. Pain 6 out of 10. Infectious disease concerned that she may have recurrence of the abscess. Sent to the ER for CAT scan. HPI    Nursing Notes were reviewed. REVIEW OF SYSTEMS    (2-9 systems for level 4, 10 or more for level 5)     Review of Systems   Constitutional:  Negative for activity change, appetite change, fatigue and fever. HENT:  Negative for congestion and sore throat. Eyes:  Negative for pain and visual disturbance. Respiratory:  Negative for chest tightness and shortness of breath. Cardiovascular:  Negative for chest pain. Gastrointestinal:  Positive for abdominal pain. Negative for nausea and vomiting. Endocrine: Negative for polydipsia. Genitourinary:  Negative for flank pain and urgency.    Musculoskeletal: Negative for gait problem and neck stiffness. Skin:  Negative for rash. Neurological:  Negative for weakness, light-headedness and headaches. Psychiatric/Behavioral:  Negative for confusion and sleep disturbance. Except as noted above the remainder of the review of systems was reviewed and negative.        PAST MEDICAL HISTORY     Past Medical History:   Diagnosis Date    Ascending aortic aneurysm ([facility] 6/23/2017    Charcot Arleen Tooth muscular atrophy     CHF (congestive heart failure) (HCC)     Chronic diastolic CHF (congestive [facility] failure) ([facility] 4/1/2022    Depression     Descending aortic aneurysm ([facility] 6/23/2017    Essential hypertension 2/16/2018    Headache     HTN (hypertension)     Impaired mobility and activities of daily living     Lumbar stenosis with neurogenic claudication     Myelopathy ([facility]     Osteoarthritis          SURGICAL HISTORY       Past Surgical History:   Procedure Laterality Date    JOINT REPLACEMENT Bilateral     knees    OTHER SURGICAL HISTORY Right 07/21/2022    cat scan guided abdominal mass aspiration with drain placement by Dr. De La Cruz Cast Left 02/25/2021    LEFT PLEURAL CATHETER INSERTION performed by Rolf Queen MD at [place] [address] Left 01/29/2021    total of 755 cc removed per Dr Moon Koehler specimen sent to lab         CURRENT MEDICATIONS       Previous Medications    ACETAMINOPHEN (TYLENOL) 325 MG TABLET    Take 650 mg by mouth every 6 hours as needed for Pain    BOSWELLIA-GLUCOSAMINE-VIT D (OSTEO BI-FLEX ONE PER DAY) TABS    Take 1 tablet by mouth daily Indications: Nutritional Support    BUDESONIDE-FORMOTEROL (SYMBICORT) 160-4.5 MCG/ACT AERO    Inhale 2 puffs into the lungs 2 times daily    CARBOXYMETHYLCELLULOSE SODIUM (EYE DROPS OP)    Apply 1 drop to eye as needed (Dry eyes) Indications: Systane     CARVEDILOL (COREG) 6.25 MG TABLET    TAKE 1 TABLET TWICE A DAY    CITALOPRAM (CELEXA) 20 MG TABLET    Take 20 mg by mouth daily Indications: Depression    FERROUS SULFATE (IRON 325) 325 (65 FE) MG TABLET    Take 1 tablet by mouth 2 times daily (with meals)    FUROSEMIDE (LASIX) 20 MG TABLET    TAKE 1 TABLET DAILY    MECLIZINE (ANTIVERT) 25 MG TABLET        MULTIPLE VITAMINS-MINERALS (CENTRUM SILVER ULTRA WOMENS) TABS    Take 1 tablet by mouth daily Indications: Nutritional Support    NITROGLYCERIN (NITROSTAT) 0.4 MG SL TABLET    up to max of 3 total doses. If no relief after 1 dose, call 911. PANTOPRAZOLE (PROTONIX) 40 MG TABLET    TAKE 1 TABLET DAILY    PIPERACILLIN-TAZOBACTAM (ZOSYN) INFUSION    Infuse 3.375 g intravenously in the morning and 3.375 g at noon and 3.375 g in the evening and 3.375 g before bedtime. CBC BMP weekly  CRP in 3 weeks  Fax results to [phone]  Follow-up with Dr. Kate Boyle in 4 weeks at [phone]. POTASSIUM CHLORIDE (KLOR-CON M) 20 MEQ EXTENDED RELEASE TABLET    Take 20 mEq by mouth daily (with breakfast) Indications: Nutritional Support    SYNTHROID 88 MCG TABLET    Take 88 mcg by mouth daily Indications: Underactive Thyroid    VITAMIN B-12 (CYANOCOBALAMIN) 1000 MCG TABLET    Take 1,000 mcg by mouth daily Indications: Nutritional Support    VITAMIN D (CHOLECALCIFEROL) 50 MCG (2000 UT) TABS TABLET    Take 1 tablet by mouth Daily with supper    WARFARIN (COUMADIN) 5 MG TABLET    Take 1 tablet by mouth daily       ALLERGIES     Latex and Tape Shayy Arianna tape]    FAMILY HISTORY       Family History   Problem Relation Age of Onset    Arthritis Mother     Arthritis Father     High Blood Pressure Father           SOCIAL HISTORY       Social History     Socioeconomic History    Marital status:      Spouse name: None    Number of children: 2    Years of education: None    Highest education level: None   Tobacco Use    Smoking status: Never    Smokeless tobacco: Never   Vaping Use    Vaping Use: Never used   Substance and Sexual Activity    Alcohol use: No     Alcohol/week: 0.0 standard drinks    Drug use:  No Sexual activity: Not Currently   Social History Narrative    , Lives With: Alone, son lives down the street, dtr is in the area    Type of Home: [address]: One level    Home Access: Stairs to enter with rails- Number of Steps: 2- Rails: Both    Bathroom Shower/Tub: Tub/Shower unit, Bathroom Equipment: Grab bars in shower, Shower chair    Home Equipment: Rolling walker, Cane(Pt infrequemtly uses DME for ambulation and prefers to furniture walk in home)    ADL Assistance: Independent, [address]: [place] [address] Responsibilities: Yes    Ambulation Assistance: Independent, Transfer Assistance: Independent    Additional Comments: Son stops over 2 times daily           SCREENINGS        Cody Coma Scale  Eye Opening: Spontaneous  Best Verbal Response: Oriented  Best Motor Response: Obeys commands  Russell Coma Scale Score: 15               PHYSICAL EXAM    (up to 7 for level 4, 8 or more for level 5)     ED Triage Vitals [09/14/22 1026]   BP Temp Temp Source Heart Rate Resp SpO2 Height Weight   (!) 85/55 98.6 °F (37 °C) Oral 88 15 92 % 5' 2\" (1.575 m) 155 lb (70.3 kg)       Physical Exam  Constitutional:       General: She is not in acute distress. Appearance: She is well-developed. She is not diaphoretic. HENT:      Head: Normocephalic and atraumatic. Right Ear: External ear normal.      Left Ear: External ear normal.      Nose: Nose normal.      Mouth/Throat:      Mouth: Mucous membranes are moist.      Pharynx: No oropharyngeal exudate. Eyes:      Extraocular Movements: Extraocular movements intact. Conjunctiva/sclera: Conjunctivae normal.      Pupils: Pupils are equal, round, and reactive to light. Neck:      Thyroid: No thyromegaly. Vascular: No JVD. Trachea: No tracheal deviation. Cardiovascular:      Rate and Rhythm: Normal rate. Heart sounds: Normal heart sounds. No murmur heard.   Pulmonary:      Effort: Pulmonary effort is normal. No respiratory distress. Breath sounds: Normal breath sounds. No wheezing. Abdominal:      General: Bowel sounds are normal.      Palpations: Abdomen is soft. Tenderness: There is no abdominal tenderness. There is no guarding. Comments: Percutaneous drain right mid abdomen. Blood tinged serous drainage. Tender to touch. No erythema   Musculoskeletal:         General: Normal range of motion. Cervical back: Normal range of motion and neck supple. Right lower leg: No edema. Left lower leg: No edema. Skin:     General: Skin is warm and dry. Findings: No rash. Neurological:      Mental Status: She is alert and oriented to person, place, and time. Cranial Nerves: No cranial nerve deficit. Psychiatric:         Behavior: Behavior normal.       DIAGNOSTIC RESULTS     EKG: All EKG's are interpreted by the Emergency Department Physician who either signs or Co-signs this chart in the absence of a cardiologist.        RADIOLOGY:   Non-plain film images such as CT, Ultrasound and MRI are read by the radiologist. Plain radiographic images are visualized and preliminarily interpreted by the emergency physician with the below findings:        Interpretation per the Radiologist below, if available at the time of this note:    CT ABDOMEN PELVIS W IV CONTRAST Additional Contrast? None   Final Result   Redemonstration of an inflammatory mass showing mixed soft tissue and fluid   attenuation with a percutaneous drainage catheter in the mid to lower abdomen   on the right, adjacent to the ascending colon. This finding has slightly   increased in size since 08/09/2022. Stable large left adnexal cystic mass. Findings in the lower chest as described.            Redemonstration of an inflammatory mass showing mixed soft tissue and fluid   attenuation with a percutaneous drainage catheter in the mid to lower abdomen   on the right, adjacent to the ascending colon. This finding has slightly   increased in size since 08/09/2022. Stable large left adnexal cystic mass. Findings in the lower chest as described. ED BEDSIDE ULTRASOUND:   Performed by ED Physician - none    LABS:  Labs Reviewed   CBC WITH AUTO DIFFERENTIAL - Abnormal; Notable for the following components:       Result Value    Hemoglobin 11.5 (*)     Hematocrit 34.9 (*)     MCV 81.4 (*)     MCH 26.9 (*)     RDW 16.2 (*)     All other components within normal limits   COMPREHENSIVE METABOLIC PANEL - Abnormal; Notable for the following components:    Anion Gap 8 (*)     Glucose 127 (*)     Albumin 3.2 (*)     Globulin 3.8 (*)     All other components within normal limits   PROTIME-INR - Abnormal; Notable for the following components:    Protime 22.8 (*)     All other components within normal limits   POCT CREATININE - Normal   CULTURE, BLOOD 1   CULTURE, BLOOD 2   LACTIC ACID       All other labs were within normal range or not returned as of this dictation. EMERGENCY DEPARTMENT COURSE and DIFFERENTIAL DIAGNOSIS/MDM:   Vitals:    Vitals:    09/14/22 1026   BP: (!) 85/55   Pulse: 88   Resp: 15   Temp: 98.6 °F (37 °C)   TempSrc: Oral   SpO2: 92%   Weight: 155 lb (70.3 kg)   Height: 5' 2\" (1.575 m)       Patient comes in with abdominal pain and was found to have redemonstration of inflammatory mass with fluid and soft tissue attenuation. Patient will be started on IV antibiotics. I spoke to infectious disease and they have arranged for patient to receive IV Invanz daily for 30 days. The infusion center was faxed the order from infectious disease    [address]   Total Critical Care time was 0 minutes, excluding separately reportable procedures. There was a high probability of clinically significant/life threatening deterioration in the patient's condition which required my urgent intervention.       CONSULTS:  None    PROCEDURES:  Unless

## 2022-09-14 NOTE — DISCHARGE INSTRUCTIONS
You have an infection in your abdomen. You will need IV antibiotics daily for 30 days. The infusion center will contact you to tell you when to come in and at what time.

## 2022-09-14 NOTE — PROGRESS NOTES
Subjective:      Patient ID: Chikis Mariscal is a 80 y.o. female who presents today for:  Chief Complaint   Patient presents with    Abscess     [facility] f[facility]u intra-abd abscess - [facility] [address] [facility]     Patient here for follow-up. Patient is a complaining of some abdominal pain that just started hurting in the last 24 hours some worsening right around her drain site. She was treated percutaneous drain placement for abscess last month. Relatively sizable collection. She also has an adnexal mass present etiology unclear. She did not see GYN in follow-up yet. She was deemed not an operative candidate while she was as inpatient by surgery. She has been following with the IR clinic .still getting some drainage daily through her bag she has not had any repeat CAT scans. She completed several weeks of antibiotics with Zosyn it appears appears these antibiotics were discontinued at the facility before her discharge from their. Her son who is present who is providing most of the history said he thinks she has been off antibiotics for 2 weeks roughly. She is currently at home.   No fevers or chills that he is aware of        Past Medical History:   Diagnosis Date    Ascending aortic aneurysm ([address] 6/23/2017    Charcot Arleen Tooth muscular atrophy     CHF (congestive heart failure) (HCC)     Chronic diastolic CHF (congestive heart failure) ([address] 4/1/2022    Depression     Descending aortic aneurysm ([address] 6/23/2017    Essential hypertension 2/16/2018    Headache     HTN (hypertension)     Impaired mobility and activities of daily living     Lumbar stenosis with neurogenic claudication     Myelopathy ([address]     Osteoarthritis      Past Surgical History:   Procedure Laterality Date    JOINT REPLACEMENT Bilateral     knees    OTHER SURGICAL HISTORY Right 07/21/2022    cat scan guided abdominal mass aspiration with drain placement by Dr. Heydi Catherine Left 02/25/2021    LEFT PLEURAL CATHETER INSERTION performed by

## 2022-09-14 NOTE — TELEPHONE ENCOUNTER
Writer contacted ED provider, Twan Navarro, to inform of 30-day readmission risk via phone . No Decision on disposition at this time. Callback: If you need to callback to inform of disposition, please call [phone].

## 2022-09-15 ENCOUNTER — TELEPHONE (OUTPATIENT)
Dept: INFECTIOUS DISEASES | Age: 87
End: 2022-09-15

## 2022-09-15 DIAGNOSIS — I50.9 CONGESTIVE HEART FAILURE, NYHA CLASS 1, UNSPECIFIED CONGESTIVE HEART FAILURE TYPE (HCC): ICD-10-CM

## 2022-09-15 DIAGNOSIS — K65.1 INTRA-ABDOMINAL ABSCESS (HCC): Primary | ICD-10-CM

## 2022-09-15 NOTE — TELEPHONE ENCOUNTER
Follow up call to patient's son, earlier today. Mrs Shara Ortega was seen by ID Physician Dr Nicole Cruz and a CT of Abd was ordered. Now IV Abx have been ordered. Found patient is on 5mg, po, Coumadin, qd. Will inquire with ordering physician for a Hold prior to Picc Line placement. 4:50 PM, Found Dr Paulino Espinal said [id number] [address] to Stop coumadin for 4-5 days prior to Picc Line placement. Call to Patient's son, he states he was Not able to take his mother for IV Infusion today, but has spoken with the Nurse and is scheduled to start tomorrow, 9/21/22 with the IV Abx,daily until a Picc Line is placed. He will not give coumadin starting this evening. Will update patient with Picc Line appt. Patient has [facility]. PT/Inr lab added to be obtained Thur. 9/22/22.

## 2022-09-17 LAB
GFR AFRICAN AMERICAN: >60
GFR NON-AFRICAN AMERICAN: >60
PERFORMED ON: NORMAL
POC CREATININE: 0.7 MG/DL (ref 0.6–1.2)
POC SAMPLE TYPE: NORMAL

## 2022-09-19 ENCOUNTER — OFFICE VISIT (OUTPATIENT)
Dept: INTERVENTIONAL RADIOLOGY/VASCULAR | Age: 87
End: 2022-09-19
Payer: MEDICARE

## 2022-09-19 ENCOUNTER — TELEPHONE (OUTPATIENT)
Dept: CARDIOLOGY CLINIC | Age: 87
End: 2022-09-19

## 2022-09-19 VITALS
BODY MASS INDEX: 31.09 KG/M2 | OXYGEN SATURATION: 95 % | WEIGHT: 159.2 LBS | HEART RATE: 69 BPM | SYSTOLIC BLOOD PRESSURE: 123 MMHG | RESPIRATION RATE: 18 BRPM | TEMPERATURE: 97.5 F | DIASTOLIC BLOOD PRESSURE: 73 MMHG

## 2022-09-19 VITALS
WEIGHT: 155 LBS | DIASTOLIC BLOOD PRESSURE: 82 MMHG | BODY MASS INDEX: 28.35 KG/M2 | SYSTOLIC BLOOD PRESSURE: 139 MMHG | HEART RATE: 57 BPM

## 2022-09-19 DIAGNOSIS — R19.00 ABDOMINAL MASS, UNSPECIFIED ABDOMINAL LOCATION: ICD-10-CM

## 2022-09-19 DIAGNOSIS — K65.1 ABSCESS OF ABDOMINAL CAVITY (HCC): Primary | ICD-10-CM

## 2022-09-19 DIAGNOSIS — R10.31 RLQ ABDOMINAL PAIN: ICD-10-CM

## 2022-09-19 DIAGNOSIS — Z98.890 HX OF DRAINAGE OF ABSCESS: ICD-10-CM

## 2022-09-19 PROBLEM — M62.81 MUSCLE WEAKNESS (GENERALIZED): Status: ACTIVE | Noted: 2022-09-19

## 2022-09-19 PROBLEM — R26.2 DIFFICULTY IN WALKING: Status: ACTIVE | Noted: 2022-01-01

## 2022-09-19 LAB
BLOOD CULTURE, ROUTINE: NORMAL
CULTURE, BLOOD 2: NORMAL

## 2022-09-19 PROCEDURE — 1036F TOBACCO NON-USER: CPT | Performed by: NURSE PRACTITIONER

## 2022-09-19 PROCEDURE — 1090F PRES/ABSN URINE INCON ASSESS: CPT | Performed by: NURSE PRACTITIONER

## 2022-09-19 PROCEDURE — 1123F ACP DISCUSS/DSCN MKR DOCD: CPT | Performed by: NURSE PRACTITIONER

## 2022-09-19 PROCEDURE — G8427 DOCREV CUR MEDS BY ELIG CLIN: HCPCS | Performed by: NURSE PRACTITIONER

## 2022-09-19 PROCEDURE — 99214 OFFICE O/P EST MOD 30 MIN: CPT | Performed by: NURSE PRACTITIONER

## 2022-09-19 PROCEDURE — G8417 CALC BMI ABV UP PARAM F/U: HCPCS | Performed by: NURSE PRACTITIONER

## 2022-09-19 ASSESSMENT — ENCOUNTER SYMPTOMS
WHEEZING: 0
VOMITING: 0
TROUBLE SWALLOWING: 0
COUGH: 0
RESPIRATORY NEGATIVE: 1
ABDOMINAL DISTENTION: 0
ABDOMINAL PAIN: 1
SHORTNESS OF BREATH: 0
DIARRHEA: 1
EYES NEGATIVE: 1
COLOR CHANGE: 0
SORE THROAT: 0
NAUSEA: 0
BACK PAIN: 0

## 2022-09-19 NOTE — TELEPHONE ENCOUNTER
Patient needs to know if she can be off Coumadin (getting a pic line-no determination as to when)-please call to discuss

## 2022-09-19 NOTE — PROGRESS NOTES
VASCULAR MEDICINE AND INTERVENTIONAL RADIOLOGY DEPARTMENT:         Lilliam Torrez, a female of 80 y.o. came to the office 9/19/2022. Chief Complaint   Patient presents with    Post-Op Check     2 wk f/u on drain      PROGRESS NOTE:     SUBJECTIVE:     9/19/2022: Lilliam Torrez S/P percutaneous placement of right colonic mass with fluid collection abscess drain placement on 7/21/2022 with Dr. Alma Villa. She is here for her every two week follow up. catheter continues to drain averaging 40-50 cc/day tan purulent fluid. Living with son who currently is caring for her. Son states he took her to ER about one week ago and was told infection is back and was told she needed IV ATB's. No ATB's prescribed yet. States she's on Warfarin and unsure who is managing and not lab orders. Denies any bleeding concerns. CT abdominal scan shows abdominal abscess remains and slightly enlarged from last scan. Having RLQ pain. Having frequent bouts of diarrhea throughout day  No fever, chills, vomiting or nausea reports. Eating well. Denies chest pain. Denies dyspnea. 9/2/2022:Darryl Rodriguez S/P percutaneous placement of right colonic mass with fluid collection abscess drain placement on 7/21/2022 with Dr. Alma Villa. She is here for two week follow up. Here with her son. Currently at [facility] for [facility]. Plan for home next week. Per [facility] staff, catheter continues to drain. Patient denies any abdominal pain. Bowel and bladder function without complications. Denies chest pain. Denies dyspnea.      Family History   Problem Relation Age of Onset    Arthritis Mother     Arthritis Father     High Blood Pressure Father        Past Surgical History:   Procedure Laterality Date    JOINT REPLACEMENT Bilateral     knees    OTHER SURGICAL HISTORY Right 07/21/2022    cat scan guided abdominal mass aspiration with drain placement by Dr. Tiffany Rodriguez Left 02/25/2021    LEFT PLEURAL CATHE[facility] 01/29/2021    total of 755 cc removed per Dr Nehemias Sim specimen sent to lab        Past Medical History:   Diagnosis Date    Ascending aortic aneurysm ([facility] 6/23/2017    Charcot Arleen Tooth muscular atrophy     CHF (congestive heart failure) ([facility])     Chronic diastolic CHF (congestive heart failure) ([facility] 4/1/2022    Depression     Descending aortic aneurysm ([facility] 6/23/2017    Essential hypertension 2/16/2018    Headache     HTN (hypertension)     Impaired mobility and activities of daily living     Lumbar stenosis with neurogenic claudication     Myelopathy ([facility]     Osteoarthritis        Social History     Socioeconomic History    Marital status:      Number of children: 2   Tobacco Use    Smoking status: Never    Smokeless tobacco: Never   Vaping Use    Vaping Use: Never used   Substance and Sexual Activity    Alcohol use: No     Alcohol/week: 0.0 standard drinks    Drug use: No    Sexual activity: Not Currently   Social History Narrative    , Lives With: Alone, son lives down the street, dtr is in the area    Type of Home: [address]: One level    Home Access: Stairs to enter with rails- Number of Steps: 2- Rails: Both    Bathroom Shower/Tub: Tub/Shower unit, Bathroom Equipment: Grab bars in shower, Shower chair    Home Equipment: Rolling walker, Cane(Pt infrequemtly uses DME for ambulation and prefers to furniture walk in home)    ADL Assistance: Independent, [address] [facility] [address]: [facility] Responsibilities: Yes    Ambulation Assistance: Independent, Transfer Assistance: Independent    Additional Comments: Son stops over 2 times daily           Allergies   Allergen Reactions    Latex     Tape [Adhesive Tape]        Current Outpatient Medications on File Prior to Visit   Medication Sig Dispense Refill    meclizine (ANTIVERT) 25 MG tablet       acetaminophen (TYLENOL) 325 MG tablet Take 650 mg by mouth every 6 hours as needed for Pain      Vitamin D (CHOLECALCIFEROL) 50 MCG (2000 UT) TABS tablet Take 1 tablet by mouth Daily with supper (Patient taking differently: Take 2,000 Units by mouth Daily with supper Indications: Nutritional Support) 60 tablet 5    warfarin (COUMADIN) 5 MG tablet Take 1 tablet by mouth daily (Patient taking differently: Take 5 mg by mouth every evening Indications: Atrial Fibrillation) 30 tablet 3    potassium chloride (KLOR-CON M) 20 MEQ extended release tablet Take 20 mEq by mouth daily (with breakfast) Indications: Nutritional Support      furosemide (LASIX) 20 MG tablet TAKE 1 TABLET DAILY (Patient taking differently: Take 20 mg by mouth daily Indications: Congestive Heart Failure) 90 tablet 3    [DISCONTINUED] digoxin (LANOXIN) 125 MCG tablet TAKE 1 TABLET DAILY 90 tablet 3    [DISCONTINUED] amLODIPine (NORVASC) 5 MG tablet Take 1 tablet by mouth daily 90 tablet 3    pantoprazole (PROTONIX) 40 MG tablet TAKE 1 TABLET DAILY (Patient taking differently: Take 40 mg by mouth daily Indications: Ulcer) 90 tablet 3    carvedilol (COREG) 6.25 MG tablet TAKE 1 TABLET TWICE A DAY (Patient taking differently: Take 6.25 mg by mouth 2 times daily Indications: High Blood Pressure Disorder) 180 tablet 3    [DISCONTINUED] XARELTO 15 MG TABS tablet TAKE 1 TABLET DAILY 90 tablet 3    nitroGLYCERIN (NITROSTAT) 0.4 MG SL tablet up to max of 3 total doses. If no relief after 1 dose, call 911. (Patient taking differently: Place 0.4 mg under the tongue every 5 minutes as needed Indications: Chest Pain up to max of 3 total doses. If no relief after 1 dose, call 911.) 25 tablet 3    budesonide-formoterol (SYMBICORT) 160-4.5 MCG/ACT AERO Inhale 2 puffs into the lungs 2 times daily (Patient taking differently: Inhale 2 puffs into the lungs in the morning and 2 puffs in the evening.  Indications: Difficulty Breathing.) 1 Inhaler 3    ferrous sulfate (IRON 325) 325 (65 Fe) MG tablet Take 1 tablet by mouth 2 times daily (with meals) (Patient taking differently: Take 325 mg by mouth 2 times daily (with meals) Indications: Anemia) 30 tablet 3    Carboxymethylcellulose Sodium (EYE DROPS OP) Apply 1 drop to eye as needed (Dry eyes) Indications: Systane       Boswellia-Glucosamine-Vit D (OSTEO BI-FLEX ONE PER DAY) TABS Take 1 tablet by mouth daily Indications: Nutritional Support      Multiple Vitamins-Minerals (CENTRUM SILVER ULTRA WOMENS) TABS Take 1 tablet by mouth daily Indications: Nutritional Support      vitamin B-12 (CYANOCOBALAMIN) 1000 MCG tablet Take 1,000 mcg by mouth daily Indications: Nutritional Support      citalopram (CELEXA) 20 MG tablet Take 20 mg by mouth daily Indications: Depression      SYNTHROID 88 MCG tablet Take 88 mcg by mouth daily Indications: Underactive Thyroid       No current facility-administered medications on file prior to visit. Review of Systems   Constitutional: Negative. Negative for chills, fatigue and fever. HENT: Negative. Negative for congestion, ear pain, sore throat and trouble swallowing. Eyes: Negative. Negative for visual disturbance. Respiratory: Negative. Negative for cough, shortness of breath and wheezing. Cardiovascular: Negative. Negative for chest pain, palpitations and leg swelling. Gastrointestinal:  Positive for abdominal pain (RLQ) and diarrhea. Negative for abdominal distention, nausea and vomiting. Endocrine: Negative. Genitourinary: Negative. Negative for difficulty urinating, dysuria and hematuria. Musculoskeletal:  Negative for back pain. RLQ abscess draining tan fluid   Skin: Negative. Negative for color change, rash and wound. Neurological: Negative. Negative for dizziness, weakness (generalized), light-headedness, numbness and headaches. Hematological: Negative. Does not bruise/bleed easily. Psychiatric/Behavioral: Negative. The patient is not nervous/anxious.     All other systems reviewed and are negative. 7/21/2022:   Impression   1. Successful drainage and drain placement of right colonic soft tissue mass with central fluid collection. 2.Fluid was aspirated and sent for microbiology and cytology analysis. Additionally a 10 Danish drain was placed yielding a thick red to yellow fluid with internal yellow debris. HISTORY: Urban Records is a Female of 80 years age. DIAGNOSIS:   Right abdominal mass with possible fluid collection vs necrotic tissue        COMPARISON: None available. CT Dose-Length Product (estimate related to radiation exposure from this exam):  541.6  mGy*cm. PROCEDURE:   Following the discussion of the procedure, alternatives, risks versus benefits, informed consent was obtained from the patient. Specifically, risks of after-biopsy pain at the site, rare possibility of excessive hemorrhage, infection, injury to the    adjacent organs were discussed and the patient verbalized understanding. Pre-procedure evaluation confirmed that the patient was an appropriate candidate for conscious    sedation. Adequate sedation was maintained during the entire procedure. Vital signs, pulse    oximetry, and response to verbal commands were monitored and recorded by the nurse throughout the    procedure and the recovery period. Medical information was entered in the medical record including the    medications and dosages used. The patient returned to baseline neurologic and physiologic status    prior to leaving the department. No immediate sedation related complications were noted. Medication    for conscious sedation was administered via IV route. 45 minutes of conscious sedation was    provided. Following universal protocol, patient and site verification was performed with a \"timeout\" prior to the procedure.         The patient was placed on the CT table in supine  position and the right lateral abdomen area was prepped and draped in usual sterile fashion. Using the usual sterile conditions, lidocaine and CT guidance, the fluid collection within soft tissue density    was accessed using a Yueh needle. After confirmation of appropriate localization of the needle, aspiration yielded a thick red to yellow fluid which was sent for microbiology and cytology analysis. Following that an Amplatz wire was placed through the    Yueh sheath into the abscess under CT guidance. The tract was serially dilated with 6, 8, and 10 [place] dilators respectively. Following that a 10 Syriac drainage catheter was advanced over the wire into the abscess under CT guidance and the anchoring    loop was formed. Catheter was sutured to the skin and and secured with Percufix device. The patient tolerated the procedure well. No immediate complications identified. The patient left the CT suite in supine position to the recovery room in stable    condition. Total local anesthetic used: lidocaine, approximately 15 mL. Estimated blood loss:  None. 8/9/2022:   Impression   1. THERE IS A THICK WALLED PERIPHERALLY ENHANCING complex SOFT TISSUE COLLECTION WITH AIR AS WELL AS A DRAINAGE CATHETER SUBJACENT TO THE PROXIMAL PORTION OF THE ASCENDING COLON, CECUM. LIKELY THAT OF THE ABSCESS. IT HAS SHOWN SOME INTERVAL DECREASE IN    SIZE as COMPARED TO THE PRIOR EXAMINATION. 2. LARGE CYSTIC ADNEXAL MASS. UNCHANGED. THE GALLBLADDER NEOPLASM IS NOT EXCLUDED. CONTINUED FURTHER EVALUATION    3. SMALL LEFT PLEURAL EFFUSION. OTHER FINDINGS DETAILED ABOVE           9/14/2022:   Narrative   EXAMINATION:   CT OF THE ABDOMEN AND PELVIS WITH CONTRAST 9/14/2022 11:54 am       TECHNIQUE:   CT of the abdomen and pelvis was performed with the administration of   intravenous contrast. Multiplanar reformatted images are provided for review.    Automated exposure control, iterative reconstruction, and/or weight based   adjustment of the mA/kV was utilized to reduce the radiation dose to as low   as reasonably achievable. COMPARISON:   08/09/2022. HISTORY:   ORDERING SYSTEM PROVIDED HISTORY: RLQ pain. hx abscess/perc drain   TECHNOLOGIST PROVIDED HISTORY:   Reason for exam:->RLQ pain. hx abscess/perc drain   Additional Contrast?->None   Decision Support Exception - unselect if not a suspected or confirmed   emergency medical condition->Emergency Medical Condition (MA)       FINDINGS:   Images of the lower chest demonstrate an enlarged heart. There is incomplete   visualization of a thoracic aortic aneurysm. Atelectasis is noted in the   lower lobe of the left lung. There is a tiny left pleural effusion. The   liver, spleen, pancreas, right and left kidneys, and both adrenal glands are   unchanged in appearance. An inflammatory mass demonstrating mixed soft   tissue and fluid attenuation with a percutaneous drainage catheter is again   visualized in the mid to lower abdomen on the right, adjacent to the   ascending colon. This finding measures 6.5 x 5.5 x 6.5 cm and has slightly   increased in size since the prior exam.  Adjacent infiltrative changes are   again noted. The urinary bladder appears unremarkable. The appendix is not   definitely seen. A 9 cm cystic mass is again visualized in the left adnexal   region. No free intra-abdominal air or ascites is identified. No abdominal   wall abnormality is noted. Impression   Redemonstration of an inflammatory mass showing mixed soft tissue and fluid   attenuation with a percutaneous drainage catheter in the mid to lower abdomen   on the right, adjacent to the ascending colon. This finding has slightly   increased in size since 08/09/2022. Stable large left adnexal cystic mass. Findings in the lower chest as described. OBJECTIVE:  /82   Pulse 57   Wt 155 lb (70.3 kg)   BMI 28.35 kg/m²     Physical Exam  Constitutional:       General: She is not in acute distress.      Appearance: Normal appearance. She is not ill-appearing. HENT:      Head: Normocephalic. Nose: No congestion. Cardiovascular:      Rate and Rhythm: Normal rate. Heart sounds: Normal heart sounds. Pulmonary:      Effort: Pulmonary effort is normal.   Abdominal:      General: Bowel sounds are normal.      Palpations: Abdomen is soft. Tenderness: There is abdominal tenderness (RLQ). Comments: RLQ abscess drain with small amount tan purulent fluid    Musculoskeletal:         General: Normal range of motion. Cervical back: Normal range of motion. Right lower leg: No edema. Left lower leg: No edema. Skin:     General: Skin is warm and dry. Neurological:      Mental Status: She is alert and oriented to person, place, and time. Psychiatric:         Mood and Affect: Mood normal.         Behavior: Behavior normal.         ASSESSMENT AND PLAN:    Abdominal CT 7/21/22 compared with 9/14 scan compared and reviewed myself and with Dr. Herbert Severin. Both show abscess drain is in location of same abscess with little changes. Stayfix dressing removed and new placed in office. Patient tolerated well. Suture intact. No s/sx infection to site. Diagnosis Orders   1. Abscess of abdominal cavity ([address]        2. Hx of drainage of abscess        3. Abdominal mass, unspecified abdominal location        4. RLQ abdominal pain              Plan:     No orders of the defined types were placed in this encounter. No orders of the defined types were placed in this encounter. --  return two weeks follow up.   --  Will obtain abdominal/pelvic CT once no further drainage. If CT shows fluid collection is resolved, will remove drain. This discussed with patient and son. --  instructed son to see/call ASAP PCP and/or ID for evaluation of frequent diarrhea and see PCP and/or cardiology to manage warfarin and INR's. Total time spent for this encounter:  35  minutes.       ADRIENNE Phillips - CNP

## 2022-09-19 NOTE — PROGRESS NOTES
Name: [address] [place] [address] Drive: [facility] [address]  Dameon Gallegos  Date: 8/19/2022     Subjective:     Chief Complaint   Patient presents with    New Patient     Evaluation of acute rehab progress, dizziness, abdominal pain, and PAF. DANNY Alonso is a 80 y.o. female being seen today for evaluation of acute rehab progress, difficulty walking and weakness, abdominal pain, complaints of vertigo, and atrial fibrillation. Resident was admitted yesterday to [facility] for acute rehab and IV antibiotics. She was treated for a right lower quadrant abscess, a percutaneous drain was placed, and IV antibiotics initiated. She has history of aortic aneurysm with new mural thrombus for which she is on warfarin, heart failure, hypertension, vertigo, lumbar stenosis, a cystic mass, pleural effusion, and chronic respiratory failure. Resident son is here visiting today, he interprets for her as she speaks mostly [place]. Resident has been complaining of vertigo which she states she has had for quite a while at home, states the vertigo occurs when she gets up in the morning and then goes away within 5 minutes. She was started on meclizine in the hospital, it's scheduled as needed. She has had falls but states when she feels dizzy she does not get up or walk too fast.  The dizziness subsides on its own. She has a right arm PICC line for which she is receive IV Zosyn, right lower quad drain tube is in place with serous fluid collecting. Lung sounds are clear, no edema, heart failure symptoms are at baseline. Her son states she is Piotr Felipe is 80years old and lives alone I live very close and help her when she needs it, she is very involved in her day-to-day schedule. \"  Vital signs are stable no fever. She is ambulating with a front wheel walker with physical therapy, occasional loss of balance, poor endurance.   She requires assistance with bathing dressing and grooming. She complains of weakness especially in her upper extremities, power grade 3- out of 5 on a scale of 5. She complains of mild abdominal pain associated with the drain tube, denies abdominal cramping bloating distention. Is orders for Tylenol for pain control if needed. She denies complaints of chest pain chest palpitations irregular heartbeat lightheadedness nausea vomiting diarrhea constipation hematuria dysuria. Past Medical History:   Diagnosis Date    Ascending aortic aneurysm ([facility] 6/23/2017    Charcot Arleen Tooth muscular atrophy     CHF (congestive heart failure) (HCC)     Chronic diastolic CHF (congestive heart failure) ([facility] 4/1/2022    Depression     [facility] 6/23/2017    Essential hypertension 2/16/2018    Headache     HTN (hypertension)     Impaired mobility and activities of daily living     Lumbar stenosis with neurogenic claudication     Myelopathy (HCC)     Osteoarthritis          Allergies:  Reviewed in nursing facility records  Medications:  Reviewed and reconciled in nursing facility records    Review of Systems  Pertinent positives as noted in HPI. Objective:   /73   Pulse 69   Temp 97.5 °F (36.4 °C)   Resp 18   Wt 159 lb 3.2 oz (72.2 kg)   SpO2 95%   BMI 31.09 kg/m²     Physical Exam  Vitals and nursing note reviewed. Constitutional:       General: She is not in acute distress. Appearance: Normal appearance. She is well-developed. She is not ill-appearing or toxic-appearing. HENT:      Head: Normocephalic. Mouth/Throat:      Mouth: Mucous membranes are moist.      Pharynx: Oropharynx is clear. Eyes:      Conjunctiva/sclera: Conjunctivae normal.      Pupils: Pupils are equal, round, and reactive to light. Neck:      Thyroid: No thyromegaly. Vascular: No JVD. Trachea: No tracheal deviation. Cardiovascular:      Rate and Rhythm: Normal rate and regular rhythm. Heart sounds: Normal heart sounds. Pulmonary:      Effort: Pulmonary effort is normal. No respiratory distress. Breath sounds: Normal breath sounds. No wheezing, rhonchi or rales. Abdominal:      General: Bowel sounds are normal. There is no distension. Palpations: Abdomen is soft. There is no mass. Tenderness: There is no abdominal tenderness. There is no guarding. Comments: RLQ drain   Musculoskeletal:         General: Normal range of motion. Cervical back: Normal range of motion and neck supple. Right lower leg: No edema. Left lower leg: No edema. Skin:     General: Skin is warm and dry. Findings: Bruising present. Neurological:      Mental Status: She is alert and oriented to person, place, and time. Cranial Nerves: No cranial nerve deficit. Motor: Weakness present. Gait: Gait abnormal.   Psychiatric:         Mood and Affect: Mood normal.         Behavior: Behavior normal.         Thought Content: Thought content normal.        Diagnosis Orders   1. Difficulty in walking        2. Muscle weakness (generalized)        3. Abscess of abdominal cavity ([address]        4. Right lower quadrant abdominal pain        5. Paroxysmal atrial fibrillation (HCC)        6. Vertigo            Assessment and Plan:      1. Difficulty in walking/Muscle weakness (generalized)  Continue PT and OT as ordered with a focus on balance gait strengthening endurance and ADLs    2. Abscess of abdominal cavity ([address]  Continue IV Zosyn as ordered, continue weekly labs and fax to infectious disease weekly. Right lower quadrant drain, document output daily    3. Right lower quadrant abdominal pain  Mild pain, continue Tylenol as ordered. 4. Paroxysmal atrial fibrillation (HCC)  PT/INR in the morning. Continue warfarin 5 mg daily. Last PT/INR was therapeutic.     5. Vertigo  Meclizine 12.5 mg p.o. daily      Reviewed labs:  Yes    CBC    Time based visit:  time spent 30 minutes>50% spent with counseling and coordination of care. Reviewed with the patient: current clinicalstatus, medications, activities and diet. Side effects, adverse effects of the medication prescribed, as well as treatment plan and result expectations. Discussed diagnostic results, other suggested diagnostic testing, prognosis, risk and benefits of treatment options, importance of compliance with treatment options with resident and nursing staff. Reviewed therapy plan and progress with therapists. Reviewed medical record, labs, medications, etc.      I have reviewed the patient's medical history in detail and updated the computerized patient record. This note was partially generated using Dragon voice recognition system, and there may be some incorrect words, spellings, punctuation that were not noticed in checking the note before saving.     Sylwia Farris, APRN-CNP

## 2022-09-20 ENCOUNTER — HOSPITAL ENCOUNTER (OUTPATIENT)
Dept: INFUSION THERAPY | Age: 87
Setting detail: INFUSION SERIES
Discharge: HOME | End: 2022-09-20

## 2022-09-20 DIAGNOSIS — K65.1 INTRA-ABDOMINAL ABSCESS (HCC): Primary | ICD-10-CM

## 2022-09-20 NOTE — FLOWSHEET NOTE
Spoke with son who said he is having trouble getting his mom here for her appointment today. Asking if she can come in tomorrow to start. Patient scheduled for tomorrow.

## 2022-09-20 NOTE — TELEPHONE ENCOUNTER
Patient's son very concerned as Mrs Lisa Cruz has not been able to start IV Abx treatment due to no Picc line placed. No orders received to date to Stop Coumadin prior to procedure. Call placed to Cardiologist today, as this ID office has not heard from the PCP. Recommendation to start in the O/P Infusion with a peripheral line was made. Call to O/P Inf. Dept, spoke with nurse Effie Tran, she states with a Dr's Order they are able to start a peripheral line and Infuse IV Abx. Dr Serafin Hinds in agreement, Orders Faxed to O/P infusion. The Son, was advised and he states he is willing to take her Daily if needed as the patient is in much pain.

## 2022-09-20 NOTE — PROGRESS NOTES
SUBJECTIVE:  5year-old woman seen for follow-up visit for heart failure afibrillation myelopathy. Patient is primarily not non-English-speaking but is able to communicate appropriately patient has not any bleeding diathesis no further lightheadedness notes new pain crisis RVR. Patient's fluid      ROS: Limited by cognition  The rest of the 14 poi0nt ROS negative    PHYSICAL EXAM: VSS per facility record  Normocephalic oral mucosa moist no thrush chest showed crackles right base cardiovascular showed a regular rate abdomen soft nontender extremity trace radial pulse skin showed no rash    ASSESSMENT & PLAN:   Diagnosis Orders   1. Acute on chronic combined systolic (congestive) and diastolic (congestive) heart failure (HCC)        2. Paroxysmal atrial fibrillation ([address]        3.  Myelopathy (HCC)          Diuresis intermittently continue with rate control has been on digoxin in the past is on beta-blocker at this time monitor further anticoagulation monitoring weights at this time course of therapy            Past Medical History:   Diagnosis Date    Ascending aortic aneurysm ([address] 6/23/2017    Charcot Arleen Tooth muscular atrophy     CHF (congestive heart failure) (HCC)     Chronic diastolic CHF (congestive heart failure) ([address] 4/1/2022    Depression     Descending aortic aneurysm ([address] 6/23/2017    Essential hypertension 2/16/2018    Headache     HTN (hypertension)     Impaired mobility and activities of daily living     Lumbar stenosis with neurogenic claudication     Myelopathy ([address]     Osteoarthritis          Past Surgical History:   Procedure Laterality Date    JOINT REPLACEMENT Bilateral     knees    OTHER SURGICAL HISTORY Right 07/21/2022    cat scan guided abdominal mass aspiration with drain placement by Dr. Grace Schmidt Left 02/25/2021    LEFT PLEURAL CATHETER INSERTION performed by Lynda Starkey MD at [place] [id number] Left 01/29/2021    total of 755 cc removed per Dr Mikayla Garcia specimen sent to lab         Current Outpatient Medications on File Prior to Visit   Medication Sig Dispense Refill    Vitamin D (CHOLECALCIFEROL) 50 MCG (2000 UT) TABS tablet Take 1 tablet by mouth Daily with supper (Patient taking differently: Take 2,000 Units by mouth Daily with supper Indications: Nutritional Support) 60 tablet 5    warfarin (COUMADIN) 5 MG tablet Take 1 tablet by mouth daily (Patient taking differently: Take 5 mg by mouth every evening Indications: Atrial Fibrillation) 30 tablet 3    potassium chloride (KLOR-CON M) 20 MEQ extended release tablet Take 20 mEq by mouth daily (with breakfast) Indications: Nutritional Support      furosemide (LASIX) 20 MG tablet TAKE 1 TABLET DAILY (Patient taking differently: Take 20 mg by mouth daily Indications: Congestive Heart Failure) 90 tablet 3    [DISCONTINUED] digoxin (LANOXIN) 125 MCG tablet TAKE 1 TABLET DAILY 90 tablet 3    [DISCONTINUED] amLODIPine (NORVASC) 5 MG tablet Take 1 tablet by mouth daily 90 tablet 3    pantoprazole (PROTONIX) 40 MG tablet TAKE 1 TABLET DAILY (Patient taking differently: Take 40 mg by mouth daily Indications: Ulcer) 90 tablet 3    carvedilol (COREG) 6.25 MG tablet TAKE 1 TABLET TWICE A DAY (Patient taking differently: Take 6.25 mg by mouth 2 times daily Indications: High Blood Pressure Disorder) 180 tablet 3    [DISCONTINUED] XARELTO 15 MG TABS tablet TAKE 1 TABLET DAILY 90 tablet 3    nitroGLYCERIN (NITROSTAT) 0.4 MG SL tablet up to max of 3 total doses. If no relief after 1 dose, call 911. (Patient taking differently: Place 0.4 mg under the tongue every 5 minutes as needed Indications: Chest Pain up to max of 3 total doses. If no relief after 1 dose, call 911.) 25 tablet 3    budesonide-formoterol (SYMBICORT) 160-4.5 MCG/ACT AERO Inhale 2 puffs into the lungs 2 times daily (Patient taking differently: Inhale 2 puffs into the lungs in the morning and 2 puffs in the evening.  Indications: Difficulty Breathing.) 1 Inhaler 3 ferrous sulfate (IRON 325) 325 (65 Fe) MG tablet Take 1 tablet by mouth 2 times daily (with meals) (Patient taking differently: Take 325 mg by mouth 2 times daily (with meals) Indications: Anemia) 30 tablet 3    Carboxymethylcellulose Sodium (EYE DROPS OP) Apply 1 drop to eye as needed (Dry eyes) Indications: Systane       Boswellia-Glucosamine-Vit D (OSTEO BI-FLEX ONE PER DAY) TABS Take 1 tablet by mouth daily Indications: Nutritional Support      Multiple Vitamins-Minerals (CENTRUM SILVER ULTRA WOMENS) TABS Take 1 tablet by mouth daily Indications: Nutritional Support      vitamin B-12 (CYANOCOBALAMIN) 1000 MCG tablet Take 1,000 mcg by mouth daily Indications: Nutritional Support      citalopram (CELEXA) 20 MG tablet Take 20 mg by mouth daily Indications: Depression      SYNTHROID 88 MCG tablet Take 88 mcg by mouth daily Indications: Underactive Thyroid       No current facility-administered medications on file prior to visit. Family History   Problem Relation Age of Onset    Arthritis Mother     Arthritis Father     High Blood Pressure Father        Social History     Socioeconomic History    Marital status:       Spouse name: Not on file    Number of children: 2    Years of education: Not on file    Highest education level: Not on file   Occupational History    Not on file   Tobacco Use    Smoking status: Never    Smokeless tobacco: Never   Vaping Use    Vaping Use: Never used   Substance and Sexual Activity    Alcohol use: No     Alcohol/week: 0.0 standard drinks    Drug use: No    Sexual activity: Not Currently   Other Topics Concern    Not on file   Social History Narrative    , Lives With: Alone, son lives down the street, dtr is in the area    Type of Home: [address]: One level    Home Access: Stairs to enter with rails- Number of Steps: 2- Rails: Both    Bathroom Shower/Tub: Tub/Shower unit, Bathroom Equipment: Grab bars in shower, Shower chair Home Equipment: Rolling walker, Cane(Pt infrequemtly uses DME for ambulation and prefers to furniture walk in home)    ADL Assistance: Independent, [address]: Independent    Homemaking Responsibilities: Yes    Ambulation Assistance: Independent, Transfer Assistance: Independent    Additional Comments: Son stops over 2 times daily         Social Determinants of Health     Financial Resource Strain: Not on file   Food Insecurity: Not on file   Transportation Needs: Not on file   Physical Activity: Not on file   Stress: Not on file   Social Connections: Not on file   Intimate Partner Violence: Not on file   Housing Stability: Not on file         No results found for: LABA1C  No results found for: EAG    Lab Results   Component Value Date/Time     09/14/2022 10:45 AM    K 3.9 09/14/2022 10:45 AM    K 3.2 07/24/2022 05:00 AM     09/14/2022 10:45 AM    CO2 27 09/14/2022 10:45 AM    BUN 13 09/14/2022 10:45 AM    CREATININE 0.7 09/14/2022 10:56 AM    CREATININE 0.66 09/14/2022 10:45 AM    GLUCOSE 127 09/14/2022 10:45 AM    CALCIUM 8.8 09/14/2022 10:45 AM        Lab Results   Component Value Date    CHOL 123 02/10/2021    CHOL 154 10/02/2020    CHOL 185 07/24/2017     Lab Results   Component Value Date    TRIG 78 02/10/2021    TRIG 87 10/02/2020    TRIG 106 07/24/2017     Lab Results   Component Value Date    HDL 39 (L) 02/10/2021    HDL 52 10/02/2020    HDL 45 07/24/2017     Lab Results   Component Value Date    LDLCALC 68 02/10/2021    [address] 10/02/2020    [address] 07/24/2017     No results found for: LABVLDL, VLDL  No results found for: [facility]    Lab Results   Component Value Date    TSH 2.690 03/17/2022       Lab Results   Component Value Date    WBC 8.0 09/14/2022    HGB 11.5 (L) 09/14/2022    HCT 34.9 (L) 09/14/2022    MCV 81.4 (L) 09/14/2022     09/14/2022       Please note orders entered on site at facility after discussion with appropriate facility nursing/therapy/ social

## 2022-09-21 ENCOUNTER — HOSPITAL ENCOUNTER (OUTPATIENT)
Dept: INFUSION THERAPY | Age: 87
Setting detail: INFUSION SERIES
Discharge: HOME | End: 2022-09-21
Payer: MEDICARE

## 2022-09-21 ENCOUNTER — PRE-PROCEDURE TELEPHONE (OUTPATIENT)
Dept: INTERVENTIONAL RADIOLOGY/VASCULAR | Age: 87
End: 2022-09-21

## 2022-09-21 VITALS
HEART RATE: 60 BPM | RESPIRATION RATE: 18 BRPM | TEMPERATURE: 98 F | SYSTOLIC BLOOD PRESSURE: 123 MMHG | DIASTOLIC BLOOD PRESSURE: 83 MMHG

## 2022-09-21 DIAGNOSIS — K65.1 INTRA-ABDOMINAL ABSCESS (HCC): Primary | ICD-10-CM

## 2022-09-21 PROCEDURE — 6360000002 HC RX W HCPCS: Performed by: INTERNAL MEDICINE

## 2022-09-21 PROCEDURE — 96365 THER/PROPH/DIAG IV INF INIT: CPT

## 2022-09-21 PROCEDURE — 2580000003 HC RX 258: Performed by: INTERNAL MEDICINE

## 2022-09-21 RX ADMIN — ERTAPENEM 1000 MG: 1 INJECTION INTRAMUSCULAR; INTRAVENOUS at 14:48

## 2022-09-21 NOTE — FLOWSHEET NOTE
Pre-Procedure call re: PICC on 9/23/22. Pt scheduled for 1400. Spoke with son and instructed to arrive at 1330 and to check in at diagnostic / imaging window. Understanding indicated. Pt will then go to outpatient infusion center following PICC placement.  Electronically signed by Patricia Estrada RN on 9/21/2022 at 4:43 PM

## 2022-09-21 NOTE — FLOWSHEET NOTE
Infusion complete, patient tolerated well. Patient son states this is not a new medication for patient. Left unit by wheelchair. All equipment used in the care for this patient has been cleaned.

## 2022-09-22 ENCOUNTER — HOSPITAL ENCOUNTER (OUTPATIENT)
Dept: INFUSION THERAPY | Age: 87
Setting detail: INFUSION SERIES
Discharge: HOME | End: 2022-09-22
Payer: MEDICARE

## 2022-09-22 ENCOUNTER — TELEPHONE (OUTPATIENT)
Dept: INFECTIOUS DISEASES | Age: 87
End: 2022-09-22

## 2022-09-22 VITALS
TEMPERATURE: 98.1 F | DIASTOLIC BLOOD PRESSURE: 80 MMHG | HEART RATE: 67 BPM | SYSTOLIC BLOOD PRESSURE: 140 MMHG | RESPIRATION RATE: 18 BRPM

## 2022-09-22 DIAGNOSIS — K65.1 INTRA-ABDOMINAL ABSCESS (HCC): Primary | ICD-10-CM

## 2022-09-22 LAB
INR BLD: 1.7
PROTHROMBIN TIME: 19.6 SEC (ref 12.3–14.9)

## 2022-09-22 PROCEDURE — 85610 PROTHROMBIN TIME: CPT

## 2022-09-22 PROCEDURE — 6360000002 HC RX W HCPCS: Performed by: INTERNAL MEDICINE

## 2022-09-22 PROCEDURE — 2580000003 HC RX 258: Performed by: INTERNAL MEDICINE

## 2022-09-22 PROCEDURE — 96365 THER/PROPH/DIAG IV INF INIT: CPT

## 2022-09-22 RX ADMIN — ERTAPENEM 1000 MG: 1 INJECTION INTRAMUSCULAR; INTRAVENOUS at 15:23

## 2022-09-22 NOTE — FLOWSHEET NOTE
Infusion is complete. Tolerated well. Left the unit via wheelchair with her son. All equipment used in the care for this patient has been cleaned.

## 2022-09-22 NOTE — FLOWSHEET NOTE
Patient to the floor via wheelchair for her labs and antibiotic. Vital signs taken. Denies any discomfort. Call light within reach.

## 2022-09-23 ENCOUNTER — HOSPITAL ENCOUNTER (OUTPATIENT)
Dept: INTERVENTIONAL RADIOLOGY/VASCULAR | Age: 87
Discharge: HOME | End: 2022-09-25
Payer: MEDICARE

## 2022-09-23 ENCOUNTER — HOSPITAL ENCOUNTER (OUTPATIENT)
Dept: INFUSION THERAPY | Age: 87
Setting detail: INFUSION SERIES
Discharge: HOME | End: 2022-09-23
Payer: MEDICARE

## 2022-09-23 VITALS
RESPIRATION RATE: 18 BRPM | SYSTOLIC BLOOD PRESSURE: 155 MMHG | HEART RATE: 65 BPM | TEMPERATURE: 97.9 F | DIASTOLIC BLOOD PRESSURE: 95 MMHG

## 2022-09-23 DIAGNOSIS — K65.1 INTRA-ABDOMINAL ABSCESS (HCC): ICD-10-CM

## 2022-09-23 DIAGNOSIS — K65.1 INTRA-ABDOMINAL ABSCESS (HCC): Primary | ICD-10-CM

## 2022-09-23 PROCEDURE — 96365 THER/PROPH/DIAG IV INF INIT: CPT

## 2022-09-23 PROCEDURE — 6360000002 HC RX W HCPCS: Performed by: INTERNAL MEDICINE

## 2022-09-23 PROCEDURE — 2709999900 IR INSERT PICC VAD W SQ PORT >5 YEARS

## 2022-09-23 PROCEDURE — 2500000003 HC RX 250 WO HCPCS: Performed by: INTERNAL MEDICINE

## 2022-09-23 PROCEDURE — 36571 INSERT PICVAD CATH: CPT | Performed by: RADIOLOGY

## 2022-09-23 PROCEDURE — 36573 INSJ PICC RS&I 5 YR+: CPT

## 2022-09-23 PROCEDURE — 2580000003 HC RX 258: Performed by: INTERNAL MEDICINE

## 2022-09-23 RX ORDER — LIDOCAINE HYDROCHLORIDE 20 MG/ML
5 INJECTION, SOLUTION INFILTRATION; PERINEURAL ONCE
Status: DISCONTINUED | OUTPATIENT
Start: 2022-09-23 | End: 2022-09-23 | Stop reason: ALTCHOICE

## 2022-09-23 RX ADMIN — LIDOCAINE HYDROCHLORIDE 5 ML: 20 INJECTION, SOLUTION EPIDURAL; INFILTRATION; INTRACAUDAL; PERINEURAL at 14:23

## 2022-09-23 RX ADMIN — ERTAPENEM 1000 MG: 1 INJECTION INTRAMUSCULAR; INTRAVENOUS at 15:35

## 2022-09-23 ASSESSMENT — PAIN SCALES - GENERAL: PAINLEVEL_OUTOF10: 0

## 2022-09-23 NOTE — DISCHARGE INSTRUCTIONS
Peripherally Inserted Central Catheter (PICC): Care Instructions  Your Care Instructions    Remember to carry your card for PICC. It is proof that the PICC line can be safely used for radiology procedures. A peripherally inserted central catheter (PICC) is a soft, flexible tube that runs under your skin from a vein in your arm to a large vein near your heart. One end of the catheter stays outside your body. It is a type of central venous catheter, or central venous line. You may have it for weeks or months. A PICC is used to give you medicine, blood products, nutrients, or fluids. A PICC makes doing these things more comfortable for you because they are put directly into the catheter. So you will not be stuck with a needle every time. A PICC also can be used to draw blood for tests. The end of the PICC sometimes has two or three openings so that you can get more than one type of fluid or medicine at a time. Your doctor may give you medicine to make you feel relaxed. You may feel a little pain when your doctor numbs your arm. Your doctor will then thread the catheter up a vein in your arm to a larger vein. You will not feel any pain. The doctor may use stitches or other devices to hold the catheter in place where it exits your arm. After the procedure, the site may be sore for a day or two. Follow-up care is a key part of your treatment and safety. Be sure to make and go to all appointments, and call your doctor if you are having problems. It's also a good idea to know your test results and keep a list of the medicines you take. How can you care for yourself at home? Do not lift anything heavier than 10-15lbs with affected arm. Do not perform any vigorous activity that involves affected arm; ie jumping jacks, push-ups, burpees etc.  Do not wear jewelry, such as necklaces, that can catch on the catheter. If the catheter breaks, follow the instructions your doctor gave you.  If you have no instructions, clamp or tie off the catheter. Then see a doctor as soon as possible. To help prevent infection, take a shower instead of a bath. Do not go swimming with the catheter. Try to keep the area dry. When you shower, cover the area with waterproof material, such as plastic wrap. Never touch the open end of the catheter if the cap is off. Never use scissors, knives, pins, or other sharp objects near the catheter or other tubing. If your catheter has a clamp, keep it clamped when you are not using it. Fasten or tape the catheter to your body to prevent pulling or dangling. Avoid clothing that rubs or pulls on your catheter. Avoid bending or crimping your catheter. Always wash your hands before you touch your catheter. Wear loose clothing over the catheter for the first 10 to 14 days. When getting dressed, be careful not to pull on the catheter. Dressing Changes  Your PICC dressing should be changed at least once a week. If the dressing becomes loose, wet, or dirty, it must be changed more often to prevent infection. Since the PICC is in one of your arms, you will not be able to change the dressing on your own. Your healthcare provider will have the required supplies needed to change your PICC dressing. These include medical tape, a surgical mask, sterile gloves, and your dressing kit. When should you call for help? Call 911 anytime you think you may need emergency care. For example, call if:    You passed out (lost consciousness). You have severe trouble breathing. You have sudden chest pain and shortness of breath, or you cough up blood. You have a fast or uneven pulse. Call your doctor now or seek immediate medical care if:    You have signs of infection, such as: Increased pain, swelling, warmth, or redness. Red streaks leading from the area. Pus or blood draining from the area. A fever. You have swelling in your face, chest, neck, or arm on the side where the catheter is. You have signs of a blood clot, such as bulging veins near the catheter. Your catheter is leaking, cracked, or clogged. You feel resistance when you inject medicine or fluids into your catheter. Your catheter is out of place. This may happen after severe coughing or vomiting, or if you pull on the catheter. You have chest pain or shortness of breath. Watch closely for changes in your health, and be sure to contact your doctor if:    You have any concerns about your catheter. Where can you learn more? Go to [email or web address] [email or web address] org and sign in to your App[employer]les account. Enter Z284 in the [facility] box to learn more about \"Peripherally Inserted Central Catheter (PICC): Care Instructions. \"     Current as of: September 23, 2018  Content Version: 12.0  © 9434-8264 Healthwise, Incorporated. Care instructions adapted under license by [facility] ([facility]). If you have questions about a medical condition or this instruction, always ask your healthcare professional. [place] [address] any warranty or liability for your use of this information.

## 2022-09-23 NOTE — FLOWSHEET NOTE
Patient to the floor via wheelchair for his infusion. Vital signs taken. Denies any discomfort. Call light within reach.

## 2022-09-25 VITALS
RESPIRATION RATE: 18 BRPM | SYSTOLIC BLOOD PRESSURE: 124 MMHG | DIASTOLIC BLOOD PRESSURE: 77 MMHG | OXYGEN SATURATION: 97 % | TEMPERATURE: 97.2 F | HEART RATE: 80 BPM

## 2022-09-25 ASSESSMENT — ENCOUNTER SYMPTOMS
COLOR CHANGE: 0
DIARRHEA: 0
SHORTNESS OF BREATH: 0
VOMITING: 0
NAUSEA: 0
ABDOMINAL PAIN: 0
CONSTIPATION: 0
COUGH: 0
CHEST TIGHTNESS: 0
ABDOMINAL DISTENTION: 0
WHEEZING: 0

## 2022-09-26 ENCOUNTER — TELEPHONE (OUTPATIENT)
Dept: CARDIOLOGY CLINIC | Age: 87
End: 2022-09-26

## 2022-09-26 NOTE — PROGRESS NOTES
Name: [address]: LONG TERM ACUTE CARE [facility] [address] [place] [address]  Dameon Gallegos  Date: 8/26/2022     Subjective:     Chief Complaint   Patient presents with    Follow-up       HPI  Roxana Anthony is a 80 y.o. female being seen today for evaluation of acute rehab progress, difficulty walking, weakness, abdominal abscess with drain, heart failure, A. fib, hypokalemia. Nursing staff report abnormal labs today potassium 3.2, will give present additional potassium today. BUN/creatinine are within acceptable range H&H 10.7 and 32.1 with mild anemia. She is on Coumadin for atrial fibrillation, INR 2.4, will continue current Coumadin dose at 5 mg p.o. daily she has history of chronic heart failure, symptoms at baseline lungs clear O2 3 L per nasal cannula no edema. She is on IV antibiotics for intra-abdominal abscess, abdominal drain has 160 cc out for 24-hour.,  Straw-colored drainage. Resident denies complaints abdominal pain or discomfort. Appetite is good urine output is good. She denies complaints of pain or discomfort. She is wearing oxygen at 3 L per nasal cannula with O2 saturation greater 90%. Lung sounds are clear. No leukocytosis. Vital signs stable no fever. We will continue current plan of care.     Past Medical History:   Diagnosis Date    Ascending aortic aneurysm ([address] 6/23/2017    Charcot Arleen Tooth muscular atrophy     CHF (congestive heart failure) (HCC)     Chronic diastolic CHF (congestive heart failure) ([address] 4/1/2022    Depression     Descending aortic aneurysm ([address] 6/23/2017    Essential hypertension 2/16/2018    Headache     HTN (hypertension)     Impaired mobility and activities of daily living     Lumbar stenosis with neurogenic claudication     Myelopathy (HCC)     Osteoarthritis          Allergies:  Reviewed in nursing facility records  Medications:  Reviewed and reconciled in nursing facility records    Review of Systems   Constitutional:  Positive for activity change and fatigue. Negative for appetite change and fever. HENT: Negative. Respiratory:  Negative for cough, chest tightness, shortness of breath and wheezing. Cardiovascular:  Negative for chest pain, palpitations and leg swelling. Gastrointestinal:  Negative for abdominal distention, abdominal pain, constipation, diarrhea, nausea and vomiting. Genitourinary:  Negative for difficulty urinating, dysuria and hematuria. Musculoskeletal:  Positive for gait problem. Negative for joint swelling. Skin:  Negative for color change and wound. Neurological:  Positive for weakness. Negative for dizziness and light-headedness. Psychiatric/Behavioral:  Negative for behavioral problems and confusion. The patient is not nervous/anxious. Objective:   /77   Pulse 80   Temp 97.2 °F (36.2 °C)   Resp 18   SpO2 97%     Physical Exam  Vitals and nursing note reviewed. Constitutional:       General: She is not in acute distress. Appearance: She is well-developed. She is not toxic-appearing. HENT:      Head: Normocephalic. Mouth/Throat:      Mouth: Mucous membranes are moist.      Pharynx: Oropharynx is clear. Eyes:      Conjunctiva/sclera: Conjunctivae normal.      Pupils: Pupils are equal, round, and reactive to light. Neck:      Thyroid: No thyromegaly. Vascular: No JVD. Trachea: No tracheal deviation. Cardiovascular:      Rate and Rhythm: Normal rate and regular rhythm. Heart sounds: Normal heart sounds. Pulmonary:      Effort: Pulmonary effort is normal. No respiratory distress. Breath sounds: Normal breath sounds. No stridor. No wheezing or rales. Abdominal:      General: Bowel sounds are normal. There is no distension. Palpations: Abdomen is soft. There is no mass. Tenderness: There is no abdominal tenderness. There is no guarding. Musculoskeletal:         General: Normal range of motion.       Cervical back: Normal range of motion and neck supple. Right lower leg: No edema. Left lower leg: No edema. Skin:     General: Skin is warm and dry. Neurological:      Mental Status: She is alert and oriented to person, place, and time. Cranial Nerves: No cranial nerve deficit. Motor: Weakness present. Gait: Gait abnormal.   Psychiatric:         Mood and Affect: Mood normal.         Behavior: Behavior normal.         Thought Content: Thought content normal.         Judgment: Judgment normal.        Diagnosis Orders   1. Intra-abdominal abscess ([facility]        2. Chronic diastolic CHF (congestive heart failure) ([address]        3. Difficulty in walking        4. Muscle weakness (generalized)        5. Paroxysmal atrial fibrillation (HCC)        6. Hypokalemia            Assessment and Plan:      1. Intra-abdominal abscess ([address]  Continue with antibiotics as ordered. Drainage with decent output, continue document every shift. 2. Chronic diastolic CHF (congestive heart failure) ([facility])  Heart failure symptoms at baseline, continue medications as ordered. 3. Difficulty in walking  Continue PT and OT as ordered. 4. Muscle weakness (generalized)  Continue PT and OT as ordered. 5. Paroxysmal atrial fibrillation (HCC)  INR is 2.4 today, continue Coumadin 5 mg p.o. daily. Dax PT/INR in 1 week. 6. Hypokalemia  Potassium 3.2, give KCl 20 mEq p.o. x1 today, repeat potassium level in the morning.       Reviewed labs:  Yes    CBC With Platelet No Differential       WBC  6.1 K/uL 4.8-10.8  Final           RBC  3.91 M/uL 4.20-5.40 L Final           Hemoglobin  10.7 g/dL 12.0-16.0 L Final           Hematocrit  32.1 % 37.0-47.0 L Final           MCV  82.3 fL 82.0-100.0  Final           MCH  27.5 pg 27.0-31.3  Final           MCHC  33.4 % 33.0-37.0  Final           RDW  16.1 % 11.5-14.5 H Final           Platelet Count  562 K/uL 130-400  Final       Prothrombin Time       Prothrombin Time  26.0 sec 12.3-14.9 H Final INR  2.4    Final       Basic Metabolic Panel       Sodium  141 mEq/L 135-144  Final           Potassium  3.2 mEq/L 3.4-4.9 L Final           Chloride  103 mEq/L   Final           CO2  29 mEq/L 20-31  Final           Anion Gap  9 mEq/L 9-15  Final           Glucose  86 mg/dL 70-99  Final           BUN  12 mg/dL 8-23  Final           Creatinine  0.52 mg/dL 0.50-0.90  Final           GFR  > 60.0  >60  Final      >60 mL/min/1.73m2 EGFR, calc. for ages 25 and older using the  MDRD formula (not corrected for weight), is valid for stable  renal function. eGFR   > 60.0  >60  Final      >60 mL/min/1.73m2 EGFR, calc. for ages 25 and older using the  MDRD formula (not corrected for weight), is valid for stable  renal function. Calcium  8.7 mg/dL 8.5-9.9  Final      Time based visit:  time spent 25 minutes>50% spent with counseling and coordination of care. Reviewed with the patient: current clinicalstatus, medications, activities and diet. Side effects, adverse effects of the medication prescribed, as well as treatment plan and result expectations. Discussed diagnostic results, other suggested diagnostic testing, prognosis, risk and benefits of treatment options, importance of compliance with treatment options with resident and nursing staff. Reviewed therapy plan and progress with therapists. Reviewed medical record, labs, medications, etc.      I have reviewed the patient's medical history in detail and updated the computerized patient record. This note was partially generated using Dragon voice recognition system, and there may be some incorrect words, spellings, punctuation that were not noticed in checking the note before saving.     Pro Alanis, ADRIENNE-CNP

## 2022-09-26 NOTE — TELEPHONE ENCOUNTER
Please advise    Home Health Nurse, Dylan Rather calling to find out if pt should resume Coumadin? Pt's son is requesting and begging for pt to be off of Coumadin. They want to know when should PT-INR be drawn ? Looks like someone created a Pro[facility]- INR lab order on 9/20/22. Last drawn on 9/22/22.       Addie  # [phone]   Office # [phone]

## 2022-09-27 LAB
ALBUMIN SERPL-MCNC: 3.6 G/DL (ref 3.5–4.6)
ALP BLD-CCNC: 57 U/L (ref 40–130)
ALT SERPL-CCNC: 14 U/L (ref 0–33)
ANION GAP SERPL CALCULATED.3IONS-SCNC: 12 MEQ/L (ref 9–15)
AST SERPL-CCNC: 24 U/L (ref 0–35)
BASOPHILS ABSOLUTE: 0.1 K/UL (ref 0–0.2)
BASOPHILS RELATIVE PERCENT: 0.9 %
BILIRUB SERPL-MCNC: 0.5 MG/DL (ref 0.2–0.7)
BUN BLDV-MCNC: 15 MG/DL (ref 8–23)
C-REACTIVE PROTEIN: 20.2 MG/L (ref 0–5)
CALCIUM SERPL-MCNC: 9.3 MG/DL (ref 8.5–9.9)
CHLORIDE BLD-SCNC: 101 MEQ/L (ref 95–107)
CO2: 26 MEQ/L (ref 20–31)
CREAT SERPL-MCNC: 0.54 MG/DL (ref 0.5–0.9)
EOSINOPHILS ABSOLUTE: 0.2 K/UL (ref 0–0.7)
EOSINOPHILS RELATIVE PERCENT: 3.3 %
GFR AFRICAN AMERICAN: >60
GFR NON-AFRICAN AMERICAN: >60
GLOBULIN: 3.7 G/DL (ref 2.3–3.5)
GLUCOSE BLD-MCNC: 92 MG/DL (ref 70–99)
HCT VFR BLD CALC: 33.5 % (ref 37–47)
HEMOGLOBIN: 11.1 G/DL (ref 12–16)
LYMPHOCYTES ABSOLUTE: 1.1 K/UL (ref 1–4.8)
LYMPHOCYTES RELATIVE PERCENT: 15.8 %
MCH RBC QN AUTO: 26.9 PG (ref 27–31.3)
MCHC RBC AUTO-ENTMCNC: 33.1 % (ref 33–37)
MCV RBC AUTO: 81.3 FL (ref 82–100)
MONOCYTES ABSOLUTE: 0.7 K/UL (ref 0.2–0.8)
MONOCYTES RELATIVE PERCENT: 10.6 %
NEUTROPHILS ABSOLUTE: 4.8 K/UL (ref 1.4–6.5)
NEUTROPHILS RELATIVE PERCENT: 69.4 %
PDW BLD-RTO: 16.7 % (ref 11.5–14.5)
PLATELET # BLD: 199 K/UL (ref 130–400)
POTASSIUM SERPL-SCNC: 3.7 MEQ/L (ref 3.4–4.9)
RBC # BLD: 4.12 M/UL (ref 4.2–5.4)
SODIUM BLD-SCNC: 139 MEQ/L (ref 135–144)
TOTAL PROTEIN: 7.3 G/DL (ref 6.3–8)
WBC # BLD: 7 K/UL (ref 4.8–10.8)

## 2022-09-29 DIAGNOSIS — I48.0 PAROXYSMAL ATRIAL FIBRILLATION (HCC): ICD-10-CM

## 2022-09-29 NOTE — TELEPHONE ENCOUNTER
Pt of Dr Miles Colon. Please advise. Pt son calling. Requesting for pt to go back on Xarelto instead of Coumadin. States that she's been off of blood thinner for 2 weeks now. Is that ok to be off of it for that long? States that they don't want pt to be on coumadin since no one is available to monitor/ check blood levels coumadin?       Please call pt's son back at [phone]

## 2022-10-03 ENCOUNTER — OFFICE VISIT (OUTPATIENT)
Dept: INTERVENTIONAL RADIOLOGY/VASCULAR | Age: 87
End: 2022-10-03
Payer: MEDICARE

## 2022-10-03 VITALS
HEART RATE: 62 BPM | WEIGHT: 155 LBS | HEIGHT: 62 IN | SYSTOLIC BLOOD PRESSURE: 117 MMHG | BODY MASS INDEX: 28.52 KG/M2 | DIASTOLIC BLOOD PRESSURE: 75 MMHG

## 2022-10-03 DIAGNOSIS — R19.00 ABDOMINAL MASS, UNSPECIFIED ABDOMINAL LOCATION: ICD-10-CM

## 2022-10-03 DIAGNOSIS — K65.1 ABSCESS OF ABDOMINAL CAVITY (HCC): ICD-10-CM

## 2022-10-03 DIAGNOSIS — Z98.890 HX OF DRAINAGE OF ABSCESS: ICD-10-CM

## 2022-10-03 DIAGNOSIS — K65.1 INTRA-ABDOMINAL ABSCESS (HCC): Primary | ICD-10-CM

## 2022-10-03 DIAGNOSIS — R10.31 RLQ ABDOMINAL PAIN: ICD-10-CM

## 2022-10-03 PROCEDURE — 1123F ACP DISCUSS/DSCN MKR DOCD: CPT | Performed by: NURSE PRACTITIONER

## 2022-10-03 PROCEDURE — 99213 OFFICE O/P EST LOW 20 MIN: CPT | Performed by: NURSE PRACTITIONER

## 2022-10-03 PROCEDURE — G8417 CALC BMI ABV UP PARAM F/U: HCPCS | Performed by: NURSE PRACTITIONER

## 2022-10-03 PROCEDURE — G8484 FLU IMMUNIZE NO ADMIN: HCPCS | Performed by: NURSE PRACTITIONER

## 2022-10-03 PROCEDURE — 1090F PRES/ABSN URINE INCON ASSESS: CPT | Performed by: NURSE PRACTITIONER

## 2022-10-03 PROCEDURE — 1036F TOBACCO NON-USER: CPT | Performed by: NURSE PRACTITIONER

## 2022-10-03 PROCEDURE — G8427 DOCREV CUR MEDS BY ELIG CLIN: HCPCS | Performed by: NURSE PRACTITIONER

## 2022-10-03 ASSESSMENT — ENCOUNTER SYMPTOMS
WHEEZING: 0
ABDOMINAL DISTENTION: 0
COLOR CHANGE: 0
COUGH: 0
SHORTNESS OF BREATH: 0
EYES NEGATIVE: 1
BACK PAIN: 0
NAUSEA: 0
TROUBLE SWALLOWING: 0
DIARRHEA: 0
ABDOMINAL PAIN: 1
SORE THROAT: 0
RESPIRATORY NEGATIVE: 1
VOMITING: 0

## 2022-10-03 NOTE — PROGRESS NOTES
VASCULAR MEDICINE AND INTERVENTIONAL RADIOLOGY DEPARTMENT:         Kylee Kim, a female of 80 y.o. came to the office 10/3/2022. Chief Complaint   Patient presents with    Follow-up     2wk f/u drain     PROGRESS NOTE:     SUBJECTIVE:     10/03/2022:   Kylee Kim S/P percutaneous placement of right colonic mass with fluid collection abscess drain placement on 7/21/2022 with Dr. Tonny Baird. She is here for her every two week follow up. catheter continues to drain averaging 40-50 cc/day tan purulent fluid. Living with son who currently is caring for her. Currently on IV ATB's.     9/19/2022: Ilefred Prather A Michael S/P percutaneous placement of right colonic mass with fluid collection abscess drain placement on 7/21/2022 with Dr. Tonny Baird. She is here for her every two week follow up. catheter continues to drain averaging 40-50 cc/day tan purulent fluid. Living with son who currently is caring for her. Son states he took her to ER about one week ago and was told infection is back and was told she needed IV ATB's. No ATB's prescribed yet. States she's on Warfarin and unsure who is managing and not lab orders. Denies any bleeding concerns. CT abdominal scan shows abdominal abscess remains and slightly enlarged from last scan. Having RLQ pain. Having frequent bouts of diarrhea throughout day  No fever, chills, vomiting or nausea reports. Eating well. Denies chest pain. Denies dyspnea. 9/2/2022:Darryl Rodriguez S/P percutaneous placement of right colonic mass with fluid collection abscess drain placement on 7/21/2022 with Dr. Tonny Baird. She is here for two week follow up. Here with her son. Currently at [facility] for [facility]. Plan for home next week. Per [facility] staff, catheter continues to drain. Patient denies any abdominal pain. Bowel and bladder function without complications. Denies chest pain. Denies dyspnea.      Family History   Problem Relation Age of Onset Arthritis Mother     Arthritis Father     High Blood Pressure Father        Past Surgical History:   Procedure Laterality Date    IR INS PICC VAD W SQ PORT GREATER THAN 5  9/23/2022    IR INS PICC VAD W SQ PORT GREATER THAN 5 9/23/2022 [facility] PROCEDURE    JOINT REPLACEMENT Bilateral     knees    OTHER SURGICAL HISTORY Right 07/21/2022    cat scan guided abdominal mass aspiration with drain placement by Dr. Devendra Hernandez Left 02/25/2021    LEFT PLEURAL CATHETER INSERTION performed by Bertina Harada, MD at [place] [id number] Left 01/29/2021    total of 755 cc removed per Dr Kyra Bynum specimen sent to lab        Past Medical History:   Diagnosis Date    Ascending aortic aneurysm ([address] 6/23/2017    Charcot Arleen Tooth muscular atrophy     CHF (congestive heart failure) (HCC)     Chronic diastolic CHF (congestive heart failure) ([address] 4/1/2022    Depression     Descending aortic aneurysm ([address] 6/23/2017    Essential hypertension 2/16/2018    Headache     HTN (hypertension)     Impaired mobility and activities of daily living     Lumbar stenosis with neurogenic claudication     Myelopathy ([address]     Osteoarthritis        Social History     Socioeconomic History    Marital status:      Number of children: 2   Tobacco Use    Smoking status: Never    Smokeless tobacco: Never   Vaping Use    Vaping Use: Never used   Substance and Sexual Activity    Alcohol use: No     Alcohol/week: 0.0 standard drinks    Drug use: No    Sexual activity: Not Currently   Social History Narrative    , Lives With: Alone, son lives down the street, dtr is in the area    Type of Home: [facility]  in [address]: One level    Home Access: Stairs to enter with rails- Number of Steps: 2- Rails: Both    Bathroom Shower/Tub: Tub/Shower unit, Bathroom Equipment: Grab bars in shower, Shower chair    Home Equipment: Rolling walker, Cane(Pt infrequemtly uses DME for ambulation and prefers to furniture walk in home)    ADL Assistance: Independent, [address]: Independent    Homemaking Responsibilities: Yes    Ambulation Assistance: Independent, Transfer Assistance: Independent    Additional Comments: Son stops over 2 times daily           Allergies   Allergen Reactions    Latex     Tape [Adhesive Tape]        Current Outpatient Medications on File Prior to Visit   Medication Sig Dispense Refill    rivaroxaban (XARELTO) 15 MG TABS tablet Take 1 tablet by mouth daily 90 tablet 3    meclizine (ANTIVERT) 25 MG tablet       acetaminophen (TYLENOL) 325 MG tablet Take 650 mg by mouth every 6 hours as needed for Pain      Vitamin D (CHOLECALCIFEROL) 50 MCG (2000 UT) TABS tablet Take 1 tablet by mouth Daily with supper (Patient taking differently: Take 2,000 Units by mouth Daily with supper Indications: Nutritional Support) 60 tablet 5    potassium chloride (KLOR-CON M) 20 MEQ extended release tablet Take 20 mEq by mouth daily (with breakfast) Indications: Nutritional Support      furosemide (LASIX) 20 MG tablet TAKE 1 TABLET DAILY (Patient taking differently: Take 20 mg by mouth daily Indications: Congestive Heart Failure) 90 tablet 3    [DISCONTINUED] digoxin (LANOXIN) 125 MCG tablet TAKE 1 TABLET DAILY 90 tablet 3    [DISCONTINUED] amLODIPine (NORVASC) 5 MG tablet Take 1 tablet by mouth daily 90 tablet 3    pantoprazole (PROTONIX) 40 MG tablet TAKE 1 TABLET DAILY (Patient taking differently: Take 40 mg by mouth daily Indications: Ulcer) 90 tablet 3    carvedilol (COREG) 6.25 MG tablet TAKE 1 TABLET TWICE A DAY (Patient taking differently: Take 6.25 mg by mouth 2 times daily Indications: High Blood Pressure Disorder) 180 tablet 3    nitroGLYCERIN (NITROSTAT) 0.4 MG SL tablet up to max of 3 total doses. If no relief after 1 dose, call 911. (Patient taking differently: Place 0.4 mg under the tongue every 5 minutes as needed Indications: Chest Pain up to max of 3 total doses.  If no relief after 1 dose, call 911.) 25 tablet 3    budesonide-formoterol (SYMBICORT) 160-4.5 MCG/ACT AERO Inhale 2 puffs into the lungs 2 times daily (Patient taking differently: Inhale 2 puffs into the lungs in the morning and 2 puffs in the evening. Indications: Difficulty Breathing.) 1 Inhaler 3    ferrous sulfate (IRON 325) 325 (65 Fe) MG tablet Take 1 tablet by mouth 2 times daily (with meals) (Patient taking differently: Take 325 mg by mouth 2 times daily (with meals) Indications: Anemia) 30 tablet 3    Carboxymethylcellulose Sodium (EYE DROPS OP) Apply 1 drop to eye as needed (Dry eyes) Indications: Systane       Boswellia-Glucosamine-Vit D (OSTEO BI-FLEX ONE PER DAY) TABS Take 1 tablet by mouth daily Indications: Nutritional Support      Multiple Vitamins-Minerals (CENTRUM SILVER ULTRA WOMENS) TABS Take 1 tablet by mouth daily Indications: Nutritional Support      vitamin B-12 (CYANOCOBALAMIN) 1000 MCG tablet Take 1,000 mcg by mouth daily Indications: Nutritional Support      citalopram (CELEXA) 20 MG tablet Take 20 mg by mouth daily Indications: Depression      SYNTHROID 88 MCG tablet Take 88 mcg by mouth daily Indications: Underactive Thyroid       No current facility-administered medications on file prior to visit. Review of Systems   Constitutional: Negative. Negative for chills, fatigue and fever. HENT: Negative. Negative for congestion, ear pain, sore throat and trouble swallowing. Eyes: Negative. Negative for visual disturbance. Respiratory: Negative. Negative for cough, shortness of breath and wheezing. Cardiovascular: Negative. Negative for chest pain, palpitations and leg swelling. Gastrointestinal:  Positive for abdominal pain (RLQ). Negative for abdominal distention, diarrhea, nausea and vomiting. RLQ abscess draining pink/tan fluid   Endocrine: Negative. Genitourinary: Negative. Negative for difficulty urinating, dysuria and hematuria. Musculoskeletal:  Positive for gait problem (walker). Negative for back pain. Skin: Negative. Negative for color change, rash and wound. Neurological:  Negative for dizziness, weakness (generalized), light-headedness, numbness and headaches. Hematological: Negative. Does not bruise/bleed easily. Psychiatric/Behavioral: Negative. The patient is not nervous/anxious. All other systems reviewed and are negative. 7/21/2022:   Impression   1. Successful drainage and drain placement of right colonic soft tissue mass with central fluid collection. 2.Fluid was aspirated and sent for microbiology and cytology analysis. Additionally a 10 English drain was placed yielding a thick red to yellow fluid with internal yellow debris. HISTORY: Lynett Gowers is a Female of 80 years age. DIAGNOSIS:   Right abdominal mass with possible fluid collection vs necrotic tissue        COMPARISON: None available. CT Dose-Length Product (estimate related to radiation exposure from this exam):  541.6  mGy*cm. PROCEDURE:   Following the discussion of the procedure, alternatives, risks versus benefits, informed consent was obtained from the patient. Specifically, risks of after-biopsy pain at the site, rare possibility of excessive hemorrhage, infection, injury to the    adjacent organs were discussed and the patient verbalized understanding. Pre-procedure evaluation confirmed that the patient was an appropriate candidate for conscious    sedation. Adequate sedation was maintained during the entire procedure. Vital signs, pulse    oximetry, and response to verbal commands were monitored and recorded by the nurse throughout the    procedure and the recovery period. Medical information was entered in the medical record including the    medications and dosages used. The patient returned to baseline neurologic and physiologic status    prior to leaving the department. No immediate sedation related complications were noted.  Medication for conscious sedation was administered via IV route. 45 minutes of conscious sedation was    provided. Following universal protocol, patient and site verification was performed with a \"timeout\" prior to the procedure. The patient was placed on the CT table in supine  position and the right lateral abdomen area was prepped and draped in usual sterile fashion. Using the usual sterile conditions, lidocaine and CT guidance, the fluid collection within soft tissue density    was accessed using a Yueh needle. After confirmation of appropriate localization of the needle, aspiration yielded a thick red to yellow fluid which was sent for microbiology and cytology analysis. Following that an Amplatz wire was placed through the    Yueh sheath into the abscess under CT guidance. The tract was serially dilated with 6, 8, and 10 [place] dilators respectively. Following that a 10 Croatian drainage catheter was advanced over the wire into the abscess under CT guidance and the anchoring    loop was formed. Catheter was sutured to the skin and and secured with Percufix device. The patient tolerated the procedure well. No immediate complications identified. The patient left the CT suite in supine position to the recovery room in stable    condition. Total local anesthetic used: lidocaine, approximately 15 mL. Estimated blood loss:  None. 8/9/2022:   Impression   1. THERE IS A THICK WALLED PERIPHERALLY ENHANCING complex SOFT TISSUE COLLECTION WITH AIR AS WELL AS A DRAINAGE CATHETER SUBJACENT TO THE PROXIMAL PORTION OF THE ASCENDING COLON, CECUM. LIKELY THAT OF THE ABSCESS. IT HAS SHOWN SOME INTERVAL DECREASE IN    SIZE as COMPARED TO THE PRIOR EXAMINATION. 2. LARGE CYSTIC ADNEXAL MASS. UNCHANGED. THE GALLBLADDER NEOPLASM IS NOT EXCLUDED. CONTINUED FURTHER EVALUATION    3. SMALL LEFT PLEURAL EFFUSION.         OTHER FINDINGS DETAILED ABOVE           9/14/2022:   Narrative   EXAMINATION:   CT OF THE ABDOMEN AND PELVIS WITH CONTRAST 9/14/2022 11:54 am       TECHNIQUE:   CT of the abdomen and pelvis was performed with the administration of   intravenous contrast. Multiplanar reformatted images are provided for review. Automated exposure control, iterative reconstruction, and/or weight based   adjustment of the mA/kV was utilized to reduce the radiation dose to as low   as reasonably achievable. COMPARISON:   08/09/2022. HISTORY:   ORDERING SYSTEM PROVIDED HISTORY: RLQ pain. hx abscess/perc drain   TECHNOLOGIST PROVIDED HISTORY:   Reason for exam:->RLQ pain. hx abscess/perc drain   Additional Contrast?->None   Decision Support Exception - unselect if not a suspected or confirmed   emergency medical condition->Emergency Medical Condition (MA)       FINDINGS:   Images of the lower chest demonstrate an enlarged heart. There is incomplete   visualization of a thoracic aortic aneurysm. Atelectasis is noted in the   lower lobe of the left lung. There is a tiny left pleural effusion. The   liver, spleen, pancreas, right and left kidneys, and both adrenal glands are   unchanged in appearance. An inflammatory mass demonstrating mixed soft   tissue and fluid attenuation with a percutaneous drainage catheter is again   visualized in the mid to lower abdomen on the right, adjacent to the   ascending colon. This finding measures 6.5 x 5.5 x 6.5 cm and has slightly   increased in size since the prior exam.  Adjacent infiltrative changes are   again noted. The urinary bladder appears unremarkable. The appendix is not   definitely seen. A 9 cm cystic mass is again visualized in the left adnexal   region. No free intra-abdominal air or ascites is identified. No abdominal   wall abnormality is noted.            Impression   Redemonstration of an inflammatory mass showing mixed soft tissue and fluid   attenuation with a percutaneous drainage catheter in the mid to lower abdomen   on the right, adjacent to the ascending colon. This finding has slightly   increased in size since 08/09/2022. Stable large left adnexal cystic mass. Findings in the lower chest as described. OBJECTIVE:  /75   Pulse 62   Ht 5' 2\" (1.575 m)   Wt 155 lb (70.3 kg)   BMI 28.35 kg/m²     Physical Exam  Constitutional:       General: She is not in acute distress. Appearance: Normal appearance. She is not ill-appearing. HENT:      Head: Normocephalic. Nose: No congestion. Cardiovascular:      Rate and Rhythm: Normal rate. Heart sounds: Normal heart sounds. Pulmonary:      Effort: Pulmonary effort is normal.   Abdominal:      General: Bowel sounds are normal.      Palpations: Abdomen is soft. Tenderness: There is abdominal tenderness (RLQ). Comments: RLQ abscess drain with small amount pink/tan purulent fluid    Musculoskeletal:         General: Normal range of motion. Cervical back: Normal range of motion. Right lower leg: No edema. Left lower leg: No edema. Skin:     General: Skin is warm and dry. Neurological:      Mental Status: She is alert and oriented to person, place, and time. Psychiatric:         Mood and Affect: Mood normal.         Behavior: Behavior normal.         ASSESSMENT AND PLAN:      Stayfix dressing removed and new placed in office. Patient tolerated well. Suture intact. No s/sx infection to site. Diagnosis Orders   1. Intra-abdominal abscess ([address]        2. Abscess of abdominal cavity ([address]        3. Hx of drainage of abscess        4. Abdominal mass, unspecified abdominal location        5. RLQ abdominal pain                Plan:     No orders of the defined types were placed in this encounter. No orders of the defined types were placed in this encounter. --  return two weeks follow up.   --  Will obtain abdominal/pelvic CT once no further drainage. If CT shows fluid collection is resolved, will remove drain.  This discussed with patient and son. --  instructed son to see/call ASAP PCP and/or ID for evaluation of frequent diarrhea and see PCP and/or cardiology to manage warfarin and INR's. Total time spent for this encounter: 25 minutes.         ADRIENNE Fink - CNP

## 2022-10-04 LAB
ALBUMIN SERPL-MCNC: 3.4 G/DL (ref 3.5–4.6)
ALP BLD-CCNC: 54 U/L (ref 40–130)
ALT SERPL-CCNC: 25 U/L (ref 0–33)
ANION GAP SERPL CALCULATED.3IONS-SCNC: 15 MEQ/L (ref 9–15)
AST SERPL-CCNC: 33 U/L (ref 0–35)
BASOPHILS ABSOLUTE: 0 K/UL (ref 0–0.2)
BASOPHILS RELATIVE PERCENT: 0.7 %
BILIRUB SERPL-MCNC: 0.4 MG/DL (ref 0.2–0.7)
BUN BLDV-MCNC: 15 MG/DL (ref 8–23)
C-REACTIVE PROTEIN: 35.5 MG/L (ref 0–5)
CALCIUM SERPL-MCNC: 9.2 MG/DL (ref 8.5–9.9)
CHLORIDE BLD-SCNC: 103 MEQ/L (ref 95–107)
CO2: 24 MEQ/L (ref 20–31)
CREAT SERPL-MCNC: 0.42 MG/DL (ref 0.5–0.9)
EOSINOPHILS ABSOLUTE: 0.2 K/UL (ref 0–0.7)
EOSINOPHILS RELATIVE PERCENT: 3.7 %
GFR AFRICAN AMERICAN: >60
GFR NON-AFRICAN AMERICAN: >60
GLOBULIN: 3.6 G/DL (ref 2.3–3.5)
GLUCOSE BLD-MCNC: 79 MG/DL (ref 70–99)
HCT VFR BLD CALC: 31.8 % (ref 37–47)
HEMOGLOBIN: 10.5 G/DL (ref 12–16)
LYMPHOCYTES ABSOLUTE: 1.1 K/UL (ref 1–4.8)
LYMPHOCYTES RELATIVE PERCENT: 18.6 %
MCH RBC QN AUTO: 27 PG (ref 27–31.3)
MCHC RBC AUTO-ENTMCNC: 32.8 % (ref 33–37)
MCV RBC AUTO: 82.3 FL (ref 82–100)
MONOCYTES ABSOLUTE: 0.7 K/UL (ref 0.2–0.8)
MONOCYTES RELATIVE PERCENT: 11.5 %
NEUTROPHILS ABSOLUTE: 3.8 K/UL (ref 1.4–6.5)
NEUTROPHILS RELATIVE PERCENT: 65.5 %
PDW BLD-RTO: 16.8 % (ref 11.5–14.5)
PLATELET # BLD: 210 K/UL (ref 130–400)
POTASSIUM SERPL-SCNC: 3.9 MEQ/L (ref 3.4–4.9)
RBC # BLD: 3.87 M/UL (ref 4.2–5.4)
SODIUM BLD-SCNC: 142 MEQ/L (ref 135–144)
TOTAL PROTEIN: 7 G/DL (ref 6.3–8)
WBC # BLD: 5.8 K/UL (ref 4.8–10.8)

## 2022-10-07 ENCOUNTER — OFFICE VISIT (OUTPATIENT)
Dept: CARDIOLOGY CLINIC | Age: 87
End: 2022-10-07
Payer: MEDICARE

## 2022-10-07 VITALS — SYSTOLIC BLOOD PRESSURE: 130 MMHG | DIASTOLIC BLOOD PRESSURE: 80 MMHG | HEART RATE: 80 BPM

## 2022-10-07 DIAGNOSIS — I48.0 PAROXYSMAL ATRIAL FIBRILLATION (HCC): Primary | ICD-10-CM

## 2022-10-07 DIAGNOSIS — I50.32 CHRONIC DIASTOLIC CONGESTIVE HEART FAILURE (HCC): ICD-10-CM

## 2022-10-07 DIAGNOSIS — I71.9 DESCENDING AORTIC ANEURYSM (HCC): ICD-10-CM

## 2022-10-07 DIAGNOSIS — R06.02 SHORTNESS OF BREATH: ICD-10-CM

## 2022-10-07 DIAGNOSIS — I71.23 ANEURYSM OF DESCENDING THORACIC AORTA WITHOUT RUPTURE: ICD-10-CM

## 2022-10-07 PROCEDURE — G8417 CALC BMI ABV UP PARAM F/U: HCPCS | Performed by: INTERNAL MEDICINE

## 2022-10-07 PROCEDURE — 1036F TOBACCO NON-USER: CPT | Performed by: INTERNAL MEDICINE

## 2022-10-07 PROCEDURE — 93000 ELECTROCARDIOGRAM COMPLETE: CPT | Performed by: INTERNAL MEDICINE

## 2022-10-07 PROCEDURE — G8484 FLU IMMUNIZE NO ADMIN: HCPCS | Performed by: INTERNAL MEDICINE

## 2022-10-07 PROCEDURE — G8427 DOCREV CUR MEDS BY ELIG CLIN: HCPCS | Performed by: INTERNAL MEDICINE

## 2022-10-07 PROCEDURE — 1090F PRES/ABSN URINE INCON ASSESS: CPT | Performed by: INTERNAL MEDICINE

## 2022-10-07 PROCEDURE — 99214 OFFICE O/P EST MOD 30 MIN: CPT | Performed by: INTERNAL MEDICINE

## 2022-10-07 PROCEDURE — 1123F ACP DISCUSS/DSCN MKR DOCD: CPT | Performed by: INTERNAL MEDICINE

## 2022-10-07 NOTE — PROGRESS NOTES
Chief Complaint   Patient presents with    Atrial Fibrillation    Congestive Heart Failure       5-12-16: Patient presents for initial medical evaluation. Patient is followed on a regular basis by Dr. Viviana Junior MD. S/p hospitalization for hip pain. Was seen by us for bradycardia and RBBB. She was asymptomatic at that time. Her cardizem was lowered to 240mg daily. HR is in 70's today. Pt denies chest pain, dyspnea, dyspnea on exertion, change in exercise capacity, fatigue,  nausea, vomiting, diarrhea, constipation, motor weakness, insomnia, weight loss, syncope, dizziness, lightheadedness, palpitations, PND, orthopnea, or claudication                   no hx of MI, CHF or arrhythmia. No hx of LHC. No recent stress test. Does have back and right hip pain. 6-9-16: needs to have back surgery with DR. Emmy Olmstead. S/p ECHO with EF of 60%, mild LVH, grade I DD, mild TR, + IAS aneurysm with ? Left to right PFO, aorta aneurysm noted meauring  4.2-4.5cm. Pt denies chest pain, dyspnea, dyspnea on exertion, change in exercise capacity, fatigue,  nausea, vomiting, diarrhea, constipation, motor weakness, insomnia, weight loss, syncope, dizziness, lightheadedness, palpitations, PND, orthopnea. BP and HR are good. 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 No dissection. Descending aorta measures 3.6cm. Pt denies chest pain, dyspnea, dyspnea on exertion, change in exercise capacity, fatigue,  nausea, vomiting, diarrhea, constipation, motor weakness, insomnia, weight loss, syncope, dizziness, lightheadedness, palpitations, PND, orthopnea, or claudication. Back surgery is tomorrow. 9-27-16: as above, doing well overall. Pt denies chest pain, dyspnea, dyspnea on exertion, change in exercise capacity, fatigue,  nausea, vomiting, diarrhea, constipation, motor weakness, insomnia, weight loss, syncope, dizziness, lightheadedness, palpitations, PND, orthopnea, or claudication. No hospitalizations. No CHF type symptoms. No falls. 6-23-17: states she has been tired and fatigues. One time her BP was low in 70-80's. Improved with rest and eating. Was having nose bleeds. Her losartan was increased to BID. No pleural effusions. No pneumothoraces. There is no significant periaortic, pretracheal or perihilar adenopathy. Again note is made of aneurysmal dilatation of the ascending arch of the aorta. It measures 4.3 cm, unchanged. Again, note is made    aneurysmal dilatation of descending thoracic aorta. It measures approximately 3.9 cm, unchanged. Within the field-of-view of the abdomen, note is made of a small to moderate hiatal hernia. 8-12-17: s/p hospitalization for CP and SOB. S/p normal LHC. She did have an SVT episode s/p ablation with dr. Garth Marx. Her home BP readings are ok. Son states her HR is in the 40's at times. Her cardizem was lowered to one pill a day. Pt denies chest pain, , change in exercise capacity, fatigue,  nausea, vomiting, diarrhea, constipation, motor weakness, insomnia, weight loss, syncope, dizziness, lightheadedness, palpitations, PND, orthopnea. She was placed on [address] [place] [address] Road due to likely Afibb or hx of DVT/PE 5-7 yrs ago per family. S/p CTA of chest with no PE and stable aortic aneurysm measuing 4.3cm. Continues to have SOB. S/p ECHO    Summary   Severe annular calcification. Mild to moderate (2+) mitral regurgitation is present. Normal aortic valve structure and function. Normal tricuspid valve structure and function. Normal pulmonic valve structure and function. Normal left atrium. Left ventricular ejection fraction is visually estimated at 50%. Impaired relaxation compatible with diastolic dysfunction. ( reversed E/A   ratio)   Mild concentric left ventricular hypertrophy. Normal right atrium. dilated RV chamber. , RVSP of 24mHg. Normal right ventricle systolic pressure. No evidence of pericardial effusion.    No evidence of pleural effusion. 2-16-18: Pt denies chest pain, dyspnea, dyspnea on exertion, change in exercise capacity, fatigue,  nausea, vomiting, diarrhea, constipation, motor weakness, insomnia, weight loss, syncope, dizziness, lightheadedness, palpitations, PND, orthopnea, or claudication. No nitro use. BP and hr are good. CAD is stable. No LE discoloration or ulcers. No LE edema. No CHF type symptoms. Lipid profile is normal.    states her Bp is labile. No further SVT episodes. On Eliquis and no bleeding issues. Did have follow up with Pulm and refused HUNTER testing. Airway resistance and airway conductance were both normal.     OVERALL IMPRESSION:  This study shows no significant obstructive  pattern but there was improvement in airflow after bronchodilator  therapy suggesting mild reactive airway disease. There was no  restrictive or diffusion impairment noted on this study. 8-24-18: having labile BP readings a times. Pt denies chest pain, dyspnea, dyspnea on exertion, change in exercise capacity, fatigue,  nausea, vomiting, diarrhea, constipation, motor weakness, insomnia, weight loss, syncope, dizziness, lightheadedness, palpitations, PND, orthopnea, or claudication. No nitro use. BP and hr are good. CAD is stable. No LE discoloration or ulcers. No LE edema. No CHF type symptoms. Lipid profile is normal. No recent hospitalization. No change in meds. Known MR of 2+. On NOAC, no bleeding issues. 2-29-19: on eliquis. Was having nose bleeds and was referred to ENT. Back on Eliquis now. On ASA. Pt denies chest pain, dyspnea, dyspnea on exertion, change in exercise capacity, fatigue,  nausea, vomiting, diarrhea, constipation, motor weakness, insomnia, weight loss, syncope, dizziness, lightheadedness, palpitations, PND, orthopnea, or claudication. No nitro use. BP and hr are good. CAD is stable. No LE discoloration or ulcers. No LE edema. No CHF type symptoms. Lipid profile is normal. No recent hospitalization. No change in meds. EKG with NSR. Was placed on [address] after developing DVT post ortho surgery. No documented hx of Afibb. 10/2/2020: Patient is doing well overall. She feels good. She is only left the house twice in 6 months due to coronavirus pandemic. Patient with history of a sending aorta aneurysm measuring 4.3 cm. She has a known history of mitral regurgitation 2+. She was taken off of oral anticoagulation after DVT post Ortho surgery. She does not have any documented history of atrial fibrillation. Blood pressure is 116/70 heart rate of 67 bpm.  EKG with normal sinus rhythm, right bundle branch block morphology. Patient with history of cardiac catheterization that showed normal coronary arteries. Positive history of SVT ablation. Status post CT of the chest on March 15, 2019 that showed no change of her a sending aorta aneurysm measuring  4.3 cm by 4.3 cm at the level of the bifurcation of the pulmonary arteries, descending thoracic aorta measures 4.2 x 4.9 cm at the level of the right inferior pulmonary vein. 2/22/2020: Patient is a 80 y.o. female who presents with a chief complaint of SOB. Patient is followed on a regular basis by Dr. Peter Ball MD. 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 Patient was noted to be in acute decompensated combined heart failure. She was started on IV diuretics. She was also developed paroxysmal atrial fibrillation started on oral anticoagulation.   She underwent cardiac catheterization for elevated troponins as well as chest pain and a presentation of acute decompensated heart failure and ejection fraction of 40% was noted to have moderate mid LAD stenosis with negative IFR. Patient underwent left pleural effusion thoracentesis x2 by interventional radiology. She was seen by pulmonary as well. She was transferred to rehab previously and developed worsening shortness of breath as well as O2 desaturation and transferred to 1 [address]. Patient was noted to have urinary retention and a Zeng was placed and urology was consulted. Patient also continues to have left pleural effusion and thoracic surgery is consulted for possible Pleurx catheter. She is currently seen in rehab and doing okay. She denies any chest discomfort. She has low blood pressure and some blood pressure medications are on hold        3/25/2021: Status post hospitalization at [facility] for acute decompensated heart failure/bilateral pleural effusions. Noted to be in paroxysmal atrial fibrillation and started on oral anticoagulation. Patient had a long hospitalization as well as multiple thoracentesis. She required rehab stay. Patient was noted to have urinary retention at that time. She underwent cardiac catheterization with moderate mid LAD stenosis status post negative IFR, ejection fraction of 40%. Patient is on Xarelto. She is on Coreg as well as digoxin. Renal function is normal.  Hemoglobin is stable. 10-1-21: doing ok. On home O2. Hx of CHF. Patient with history of congestive heart failure and pleural effusions with history of thoracentesis. History of paroxysmal atrial fibrillation and she is on oral anticoagulation  She underwent cardiac catheterization with moderate mid LAD stenosis status post negative IFR, ejection fraction of 40%. Patient is on Xarelto. She is on Coreg as well as digoxin. Blood pressure and heart rate are within normal limits today. EKG with normal sinus rhythm nonspecific ST changes    4-1-22: History of CHF, pleural effusions status post thoracentesis.   Patient also with history of paroxysmal atrial fibrillation treatment remains on oral anticoagulation. he underwent cardiac catheterization with moderate mid LAD stenosis status post negative IFR, ejection fraction of 40%. Patient is on Xarelto. Patient with history of thoracic aortic aneurysm measuring 4.4 x 4.5 cm. The descending thoracic aorta measures 3.8 cm. Pt denies chest pain, dyspnea, dyspnea on exertion, change in exercise capacity, fatigue,  nausea, vomiting, diarrhea, constipation, motor weakness, insomnia, weight loss, syncope, dizziness, lightheadedness, palpitations, PND, orthopnea, or claudication. EKG with normal sinus rhythm nonspecific ST changes normal QTC    10-7-22: s/p hospitalization and rehab for abscess in her colon. Hx of afib, on DOAC now. Ascending AO aneurysm-  had increased in size and measures 5.3cm distal arch. She now has new large Mural thrombus distal arch to the descending AO since CT of 7/8/22 despite being on Xarelto due to coumadin difficult to manage. Hx of mod CAD. Normal LVF.    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    Headache    Depression    Thoracic aortic aneurysm without rupture    Descending aortic aneurysm (HCC)    Vertigo    Shortness of breath    Essential hypertension    Acute on chronic combined systolic and diastolic CHF (congestive heart failure) (HCC)    Gait abnormality    A-fib (HCC)    Pleural effusion    Hypoxia    Acute pulmonary edema (HCC)    Acute on chronic combined systolic (congestive) and diastolic (congestive) heart failure (HCC)    Acute cystitis with hematuria    Chronic diastolic CHF (congestive heart failure) (HCC)    Right lower quadrant abdominal pain    Hyponatremia    Hypokalemia    Anemia    CHF (congestive heart failure) (HCC)    Hypothyroid    Abdominal mass    Central retinal vein occlusion, left eye, stable    Hx of drainage of abscess    Abdominal pain    Streptococcus viridans infection    Aortic thrombus (HCC)    Abscess    Intra-abdominal abscess (HCC)    Abscess of abdominal cavity (HCC)    Adnexal mass    Change or removal of drains    Difficulty in walking    Muscle weakness (generalized)       Past Surgical History:   Procedure Laterality Date    IR INS PICC VAD W SQ PORT GREATER THAN 5  9/23/2022    IR INS PICC VAD W SQ PORT GREATER THAN 5 9/23/2022 [facility] SPECIAL PROCEDURE    JOINT REPLACEMENT Bilateral     knees    OTHER SURGICAL HISTORY Right 07/21/2022    cat scan guided abdominal mass aspiration with drain placement by Dr. Jayshree Bush Left 02/25/2021    LEFT PLEURAL CATHETER INSERTION performed by Wendy Cr MD at [place] [id number] Left 01/29/2021    total of 755 cc removed per Dr Joy Alas specimen sent to lab       Social History     Socioeconomic History    Marital status:      Number of children: 2   Tobacco Use    Smoking status: Never    Smokeless tobacco: Never   Vaping Use    Vaping Use: Never used   Substance and Sexual Activity    Alcohol use: No     Alcohol/week: 0.0 standard drinks    Drug use: No    Sexual activity: Not Currently   Social History Narrative    , Lives With: Alone, son lives down the street, dtr is in the area    Type of Home: [address]: One level    Home Access: Stairs to enter with rails- Number of Steps: 2- Rails: Both    Bathroom Shower/Tub: Tub/Shower unit, Bathroom Equipment: Grab bars in shower, Shower chair    Home Equipment: Rolling walker, Cane(Pt infrequemtly uses DME for ambulation and prefers to furniture walk in home)    ADL Assistance: [facility]: J[address] Responsibilities: Yes    Ambulation Assistance: Independent, Transfer Assistance: Independent    Additional Comments: Son stops over 2 times daily           Family History   Problem Relation Age of Onset    Arthritis Mother     Arthritis Father     High Blood Pressure Father        Current Outpatient Medications   Medication Sig Dispense Refill    rivaroxaban (XARELTO) 15 MG TABS tablet Take 1 tablet by mouth daily 90 tablet 3    meclizine (ANTIVERT) 25 MG tablet       acetaminophen (TYLENOL) 325 MG tablet Take 650 mg by mouth every 6 hours as needed for Pain      Vitamin D (CHOLECALCIFEROL) 50 MCG (2000 UT) TABS tablet Take 1 tablet by mouth Daily with supper (Patient taking differently: Take 2,000 Units by mouth Daily with supper Indications: Nutritional Support) 60 tablet 5    potassium chloride (KLOR-CON M) 20 MEQ extended release tablet Take 20 mEq by mouth daily (with breakfast) Indications: Nutritional Support      furosemide (LASIX) 20 MG tablet TAKE 1 TABLET DAILY (Patient taking differently: Take 20 mg by mouth daily Indications: Congestive Heart Failure) 90 tablet 3    pantoprazole (PROTONIX) 40 MG tablet TAKE 1 TABLET DAILY (Patient taking differently: Take 40 mg by mouth daily Indications: Ulcer) 90 tablet 3    carvedilol (COREG) 6.25 MG tablet TAKE 1 TABLET TWICE A DAY (Patient taking differently: Take 6.25 mg by mouth 2 times daily Indications: High Blood Pressure Disorder) 180 tablet 3    nitroGLYCERIN (NITROSTAT) 0.4 MG SL tablet up to max of 3 total doses. If no relief after 1 dose, call 911. (Patient taking differently: Place 0.4 mg under the tongue every 5 minutes as needed Indications: Chest Pain up to max of 3 total doses. If no relief after 1 dose, call 911.) 25 tablet 3    budesonide-formoterol (SYMBICORT) 160-4.5 MCG/ACT AERO Inhale 2 puffs into the lungs 2 times daily (Patient taking differently: Inhale 2 puffs into the lungs in the morning and 2 puffs in the evening.  Indications: Difficulty Breathing.) 1 Inhaler 3    ferrous sulfate (IRON 325) 325 (65 Fe) MG tablet Take 1 tablet by mouth 2 times daily (with meals) (Patient taking differently: Take 325 mg by mouth 2 times daily (with meals) Indications: Anemia) 30 tablet 3    Carboxymethylcellulose Sodium (EYE DROPS OP) Apply 1 drop to eye as needed (Dry eyes) Indications: Systane       Boswellia-Glucosamine-Vit D (OSTEO BI-FLEX ONE PER DAY) TABS Take 1 tablet by mouth daily Indications: Nutritional Support      Multiple Vitamins-Minerals (CENTRUM SILVER ULTRA WOMENS) TABS Take 1 tablet by mouth daily Indications: Nutritional Support      vitamin B-12 (CYANOCOBALAMIN) 1000 MCG tablet Take 1,000 mcg by mouth daily Indications: Nutritional Support      citalopram (CELEXA) 20 MG tablet Take 20 mg by mouth daily Indications: Depression      SYNTHROID 88 MCG tablet Take 88 mcg by mouth daily Indications: Underactive Thyroid       No current facility-administered medications for this visit. Latex and Tape [adhesive tape]    Review of Systems:  General ROS: negative  Psychological ROS: negative  Hematological and Lymphatic ROS: No history of blood clots or bleeding disorder. Respiratory ROS: no cough, shortness of breath, or wheezing  Cardiovascular ROS: no chest pain or dyspnea on exertion  Gastrointestinal ROS: no abdominal pain, change in bowel habits, or black or bloody stools  Genito-Urinary ROS: no dysuria, trouble voiding, or hematuria  Musculoskeletal ROS: negative  Neurological ROS: no TIA or stroke symptoms  Dermatological ROS: negative    VITALS:  Blood pressure 130/80, pulse 80. There is no height or weight on file to calculate BMI. Physical Examination:  General appearance - alert, well appearing, and in no distress and overweight  Mental status - alert, oriented to person, place, and time  Neck - Neck is supple, no JVD or carotid bruits. No thyromegaly or adenopathy.    Chest - clear to auscultation, no wheezes, rales or rhonchi, symmetric air entry  Heart - normal rate, regular rhythm, normal S1, S2, no murmurs, rubs, clicks or gallops  Abdomen - soft, nontender, nondistended, no masses or organomegaly  Neurological - alert, oriented, normal speech, no focal findings or movement disorder noted  Extremities - peripheral pulses normal, 1-2+ pedal edema, no clubbing or cyanosis  Skin - normal coloration and turgor, no rashes, no suspicious skin lesions noted        Orders Placed This Encounter   Procedures    EKG 12 Lead       ASSESSMENT:     Diagnosis Orders   1. Paroxysmal atrial fibrillation (HCC)  EKG 12 Lead      2. Chronic diastolic congestive heart failure ([address]        3. Descending aortic aneurysm ([address]        4. Aneurysm of descending thoracic aorta without rupture        5. Shortness of breath        7. Nonobstructive CAD      PLAN:     Patient will need to continue to follow up with you for their general medical care     As always, aggressive risk factor modification is strongly recommended. We should adhere to the [address] VII guidelines for HTN management and the NCEP ATP III guidelines for LDL-C management. Cardiac diet is always recommended with low fat, cholesterol, calories and sodium. Continue medications at current doses. Medical therapy for aorta aneurysm, not surgical candidate. Also has infection now. Cont with DOAC given afib and mural thrombus in aorta    Follow up with ID for colon abscess. Patient was advised and encouraged to check blood pressure at home or at a pharmacy, maintain a logbook, and also call us back if blood pressure are above the target ranges or if it is low. Patient clearly understands and agrees to the instructions. We will need to continue to monitor muscle and liver enzymes, BUN, CR, and electrolytes.     Details of medical condition explained and patient was warned about adverse consequences of uncontrolled medical conditions and possible side effects of prescribed medications.

## 2022-10-10 ENCOUNTER — TELEPHONE (OUTPATIENT)
Dept: INTERVENTIONAL RADIOLOGY/VASCULAR | Age: 87
End: 2022-10-10

## 2022-10-10 DIAGNOSIS — Z98.890 HX OF DRAINAGE OF ABSCESS: ICD-10-CM

## 2022-10-10 DIAGNOSIS — K65.1 ABSCESS OF ABDOMINAL CAVITY (HCC): ICD-10-CM

## 2022-10-10 DIAGNOSIS — R10.31 RLQ ABDOMINAL PAIN: ICD-10-CM

## 2022-10-10 DIAGNOSIS — K65.1 INTRA-ABDOMINAL ABSCESS (HCC): Primary | ICD-10-CM

## 2022-10-10 NOTE — TELEPHONE ENCOUNTER
Son called in and states mother fell last Friday. He found her in bathroom on floor. Since then has had no further drainage from RLQ abscess drain. States she is not complaining of any increased abdominal pain at this time. Discussed with him I will order abdominal/pelvic CT with contrast. Will need BMP prior. He needs to call IR office back when patient get CT scan for review. He verbalized understanding.

## 2022-10-11 LAB
ALBUMIN SERPL-MCNC: 3.3 G/DL (ref 3.5–4.6)
ALP BLD-CCNC: 56 U/L (ref 40–130)
ALT SERPL-CCNC: 22 U/L (ref 0–33)
ANION GAP SERPL CALCULATED.3IONS-SCNC: 8 MEQ/L (ref 9–15)
AST SERPL-CCNC: 30 U/L (ref 0–35)
BASOPHILS ABSOLUTE: 0 K/UL (ref 0–0.2)
BASOPHILS RELATIVE PERCENT: 0.8 %
BILIRUB SERPL-MCNC: 0.3 MG/DL (ref 0.2–0.7)
BUN BLDV-MCNC: 14 MG/DL (ref 8–23)
C-REACTIVE PROTEIN: 28.8 MG/L (ref 0–5)
CALCIUM SERPL-MCNC: 8.7 MG/DL (ref 8.5–9.9)
CHLORIDE BLD-SCNC: 107 MEQ/L (ref 95–107)
CO2: 29 MEQ/L (ref 20–31)
CREAT SERPL-MCNC: 0.48 MG/DL (ref 0.5–0.9)
EOSINOPHILS ABSOLUTE: 0.2 K/UL (ref 0–0.7)
EOSINOPHILS RELATIVE PERCENT: 4.2 %
GFR AFRICAN AMERICAN: >60
GFR NON-AFRICAN AMERICAN: >60
GLOBULIN: 3.4 G/DL (ref 2.3–3.5)
GLUCOSE BLD-MCNC: 141 MG/DL (ref 70–99)
HCT VFR BLD CALC: 32.1 % (ref 37–47)
HEMOGLOBIN: 10.5 G/DL (ref 12–16)
LYMPHOCYTES ABSOLUTE: 0.7 K/UL (ref 1–4.8)
LYMPHOCYTES RELATIVE PERCENT: 14.7 %
MCH RBC QN AUTO: 27 PG (ref 27–31.3)
MCHC RBC AUTO-ENTMCNC: 32.8 % (ref 33–37)
MCV RBC AUTO: 82.2 FL (ref 82–100)
MONOCYTES ABSOLUTE: 0.5 K/UL (ref 0.2–0.8)
MONOCYTES RELATIVE PERCENT: 9.4 %
NEUTROPHILS ABSOLUTE: 3.6 K/UL (ref 1.4–6.5)
NEUTROPHILS RELATIVE PERCENT: 70.9 %
PDW BLD-RTO: 16.3 % (ref 11.5–14.5)
PLATELET # BLD: 224 K/UL (ref 130–400)
POTASSIUM SERPL-SCNC: 3.5 MEQ/L (ref 3.4–4.9)
RBC # BLD: 3.9 M/UL (ref 4.2–5.4)
SODIUM BLD-SCNC: 144 MEQ/L (ref 135–144)
TOTAL PROTEIN: 6.7 G/DL (ref 6.3–8)
WBC # BLD: 5.1 K/UL (ref 4.8–10.8)

## 2022-10-19 ENCOUNTER — HOSPITAL ENCOUNTER (OUTPATIENT)
Dept: CT IMAGING | Age: 87
Discharge: HOME | End: 2022-10-21
Payer: MEDICARE

## 2022-10-19 DIAGNOSIS — K65.1 ABSCESS OF ABDOMINAL CAVITY (HCC): ICD-10-CM

## 2022-10-19 DIAGNOSIS — R10.31 RLQ ABDOMINAL PAIN: ICD-10-CM

## 2022-10-19 DIAGNOSIS — K65.1 INTRA-ABDOMINAL ABSCESS (HCC): ICD-10-CM

## 2022-10-19 DIAGNOSIS — Z98.890 HX OF DRAINAGE OF ABSCESS: ICD-10-CM

## 2022-10-19 LAB
ALBUMIN SERPL-MCNC: 3.1 G/DL (ref 3.5–4.6)
ALP BLD-CCNC: 78 U/L (ref 40–130)
ALT SERPL-CCNC: 84 U/L (ref 0–33)
ANION GAP SERPL CALCULATED.3IONS-SCNC: 7 MEQ/L (ref 9–15)
AST SERPL-CCNC: 91 U/L (ref 0–35)
BASOPHILS ABSOLUTE: 0 K/UL (ref 0–0.2)
BASOPHILS RELATIVE PERCENT: 0.7 %
BILIRUB SERPL-MCNC: 0.6 MG/DL (ref 0.2–0.7)
BUN BLDV-MCNC: 10 MG/DL (ref 8–23)
C-REACTIVE PROTEIN: 37.8 MG/L (ref 0–5)
CALCIUM SERPL-MCNC: 8.6 MG/DL (ref 8.5–9.9)
CHLORIDE BLD-SCNC: 106 MEQ/L (ref 95–107)
CO2: 28 MEQ/L (ref 20–31)
CREAT SERPL-MCNC: 0.46 MG/DL (ref 0.5–0.9)
EOSINOPHILS ABSOLUTE: 0.2 K/UL (ref 0–0.7)
EOSINOPHILS RELATIVE PERCENT: 3.2 %
GFR SERPL CREATININE-BSD FRML MDRD: >60 ML/MIN/{1.73_M2}
GLOBULIN: 3.3 G/DL (ref 2.3–3.5)
GLUCOSE BLD-MCNC: 96 MG/DL (ref 70–99)
HCT VFR BLD CALC: 32.1 % (ref 37–47)
HEMOGLOBIN: 10.2 G/DL (ref 12–16)
LYMPHOCYTES ABSOLUTE: 0.8 K/UL (ref 1–4.8)
LYMPHOCYTES RELATIVE PERCENT: 12.9 %
MCH RBC QN AUTO: 26.3 PG (ref 27–31.3)
MCHC RBC AUTO-ENTMCNC: 31.7 % (ref 33–37)
MCV RBC AUTO: 83 FL (ref 79.4–94.8)
MONOCYTES ABSOLUTE: 0.6 K/UL (ref 0.2–0.8)
MONOCYTES RELATIVE PERCENT: 9.9 %
NEUTROPHILS ABSOLUTE: 4.5 K/UL (ref 1.4–6.5)
NEUTROPHILS RELATIVE PERCENT: 73.3 %
PDW BLD-RTO: 16.7 % (ref 11.5–14.5)
PLATELET # BLD: 180 K/UL (ref 130–400)
POTASSIUM SERPL-SCNC: 3.7 MEQ/L (ref 3.4–4.9)
RBC # BLD: 3.87 M/UL (ref 4.2–5.4)
SODIUM BLD-SCNC: 141 MEQ/L (ref 135–144)
TOTAL PROTEIN: 6.4 G/DL (ref 6.3–8)
WBC # BLD: 6.1 K/UL (ref 4.8–10.8)

## 2022-10-19 PROCEDURE — 6360000004 HC RX CONTRAST MEDICATION: Performed by: NURSE PRACTITIONER

## 2022-10-19 PROCEDURE — 74177 CT ABD & PELVIS W/CONTRAST: CPT

## 2022-10-19 PROCEDURE — 74177 CT ABD & PELVIS W/CONTRAST: CPT | Performed by: RADIOLOGY

## 2022-10-19 RX ADMIN — IOPAMIDOL 50 ML: 612 INJECTION, SOLUTION INTRAVENOUS at 13:26

## 2022-10-20 ASSESSMENT — ENCOUNTER SYMPTOMS
SORE THROAT: 0
COLOR CHANGE: 0
VOMITING: 0
TROUBLE SWALLOWING: 0
BACK PAIN: 0
DIARRHEA: 0
COUGH: 0
WHEEZING: 0
NAUSEA: 0
SHORTNESS OF BREATH: 0
ABDOMINAL DISTENTION: 0
ABDOMINAL PAIN: 1
EYES NEGATIVE: 1
RESPIRATORY NEGATIVE: 1

## 2022-10-20 NOTE — PROGRESS NOTES
VASCULAR MEDICINE AND INTERVENTIONAL RADIOLOGY DEPARTMENT:         Tory Sifuentes, a female of 80 y.o. came to the office 10/24/2022. Chief Complaint   Patient presents with    Results     2 wk f/u Ct results     PROGRESS NOTE:     SUBJECTIVE:     10/24/2022: Tory Sifuentes S/P percutaneous placement of right colonic mass with fluid collection abscess drain placement on 7/21/2022 with Dr. Brie Rodríguez. She is here for her every two week follow up and results of abdominal CT. CT abdomen done due to possible drain being pulled out after a fall and no drainage noted after fall about 3 weeks ago. Son is with her who manages her care. She continues to have RLQ pain. Foul smelling drainage coming from around drain tract site to RLQ. The RLQ abscess drain catheter is still intact to skin but no drainage in bag. Son states he is unsure if she is on IV ATB's still as she had finished therapy and then was called for delivery of more. He will check with ID. Has follow up appt with ID Dr. Jaclyn Zuniga this Friday. Primary ID is Dr. Everardo Donato. Abscess drain placed as she is not a surgical candidate. Currently on Xarelto, switched off Warfarin, in October. Managed by Dr. 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 H/O Afib      10/03/2022:   Mio RAMIREZ Koutsaftis S/P percutaneous placement of right colonic mass with fluid collection abscess drain placement on 7/21/2022 with Dr. Brie Rodríguez. She is here for her every two week follow up. catheter continues to drain averaging 40-50 cc/day tan purulent fluid. Living with son who currently is caring for her. Currently on IV ATB's.     9/19/2022: Mio Asif A Koutsaftis S/P percutaneous placement of right colonic mass with fluid collection abscess drain placement on 7/21/2022 with Dr. Brie Rodríguez. She is here for her every two week follow up.   catheter continues to drain averaging 40-50 cc/day tan purulent fluid. Living with son who currently is caring for her. Son states he took her to ER about one week ago and was told infection is back and was told she needed IV ATB's. No ATB's prescribed yet. States she's on Warfarin and unsure who is managing and not lab orders. Denies any bleeding concerns. CT abdominal scan shows abdominal abscess remains and slightly enlarged from last scan. Having RLQ pain. Having frequent bouts of diarrhea throughout day  No fever, chills, vomiting or nausea reports. Eating well. Denies chest pain. Denies dyspnea. 9/2/2022:Darryl Rodriguez S/P percutaneous placement of right colonic mass with fluid collection abscess drain placement on 7/21/2022 with Dr. Vinay Jaimes. She is here for two week follow up. Here with her son. Currently at [facility] for [facility]. Plan for home next week. Per [facility] staff, catheter continues to drain. Patient denies any abdominal pain. Bowel and bladder function without complications. Denies chest pain. Denies dyspnea.      Family History   Problem Relation Age of Onset    Arthritis Mother     Arthritis Father     High Blood Pressure Father        Past Surgical History:   Procedure Laterality Date    IR INS PICC VAD W SQ PORT GREATER THAN 5  9/23/2022    IR INS PICC VAD W SQ PORT GREATER THAN 5 9/23/2022 [facility] PROCEDURE    JOINT REPLACEMENT Bilateral     knees    OTHER SURGICAL HISTORY Right 07/21/2022    cat scan guided abdominal mass aspiration with drain placement by Dr. Yumiko Daniels Left 02/25/2021    LEFT PLEURAL CATHETER INSERTION performed by Florentin Wills MD at [place] [id number] Left 01/29/2021    total of 755 cc removed per Dr Jaimes Both specimen sent to lab        Past Medical History:   Diagnosis Date    Ascending aortic aneurysm ([facility] 6/23/2017    Charcot Arleen Tooth muscular atrophy     CHF (congestive heart failure) (HCC)     Chronic diastolic CHF (congestive heart failure) ([facility] [facility] 4/1/2022    Depression Descending aortic aneurysm ([facility] 6/23/2017    Essential hypertension 2/16/2018    Headache     HTN (hypertension)     Impaired mobility and activities of daily living     Lumbar stenosis with neurogenic claudication     Myelopathy ([facility]     Osteoarthritis        Social History     Socioeconomic History    Marital status:      Number of children: 2   Tobacco Use    Smoking status: Never    Smokeless tobacco: Never   Vaping Use    Vaping Use: Never used   Substance and Sexual Activity    Alcohol use: No     Alcohol/week: 0.0 standard drinks    Drug use: No    Sexual activity: Not Currently   Social History Narrative    , Lives With: Alone, son lives down the street, dtr is in the area    Type of Home: [facility]  in [address]: One level    Home Access: Stairs to enter with rails- Number of Steps: 2- Rails: Both    Bathroom Shower/Tub: Tub/Shower unit, Bathroom Equipment: Grab bars in shower, Shower chair    Home Equipment: Rolling walker, Cane(Pt infrequemtly uses DME for ambulation and prefers to furniture walk in home)    ADL Assistance: Independent, [address]: [place] [address] Responsibilities: Yes    Ambulation Assistance: Independent, Transfer Assistance: Independent    Additional Comments: Son stops over 2 times daily           Allergies   Allergen Reactions    Latex     Tape [Adhesive Tape]        Current Outpatient Medications on File Prior to Visit   Medication Sig Dispense Refill    rivaroxaban (XARELTO) 15 MG TABS tablet Take 1 tablet by mouth daily 90 tablet 3    meclizine (ANTIVERT) 25 MG tablet       acetaminophen (TYLENOL) 325 MG tablet Take 650 mg by mouth every 6 hours as needed for Pain      Vitamin D (CHOLECALCIFEROL) 50 MCG (2000 UT) TABS tablet Take 1 tablet by mouth Daily with supper (Patient taking differently: Take 2,000 Units by mouth Daily with supper Indications: Nutritional Support) 60 tablet 5    potassium chloride (KLOR-CON M) 20 MEQ extended release tablet Take 20 mEq by mouth daily (with breakfast) Indications: Nutritional Support      furosemide (LASIX) 20 MG tablet TAKE 1 TABLET DAILY (Patient taking differently: Take 20 mg by mouth daily Indications: Congestive Heart Failure) 90 tablet 3    [DISCONTINUED] digoxin (LANOXIN) 125 MCG tablet TAKE 1 TABLET DAILY 90 tablet 3    [DISCONTINUED] amLODIPine (NORVASC) 5 MG tablet Take 1 tablet by mouth daily 90 tablet 3    pantoprazole (PROTONIX) 40 MG tablet TAKE 1 TABLET DAILY (Patient taking differently: Take 40 mg by mouth daily Indications: Ulcer) 90 tablet 3    carvedilol (COREG) 6.25 MG tablet TAKE 1 TABLET TWICE A DAY (Patient taking differently: Take 6.25 mg by mouth 2 times daily Indications: High Blood Pressure Disorder) 180 tablet 3    nitroGLYCERIN (NITROSTAT) 0.4 MG SL tablet up to max of 3 total doses. If no relief after 1 dose, call 911. (Patient taking differently: Place 0.4 mg under the tongue every 5 minutes as needed Indications: Chest Pain up to max of 3 total doses. If no relief after 1 dose, call 911.) 25 tablet 3    budesonide-formoterol (SYMBICORT) 160-4.5 MCG/ACT AERO Inhale 2 puffs into the lungs 2 times daily (Patient taking differently: Inhale 2 puffs into the lungs in the morning and 2 puffs in the evening.  Indications: Difficulty Breathing.) 1 Inhaler 3    ferrous sulfate (IRON 325) 325 (65 Fe) MG tablet Take 1 tablet by mouth 2 times daily (with meals) (Patient taking differently: Take 325 mg by mouth 2 times daily (with meals) Indications: Anemia) 30 tablet 3    Carboxymethylcellulose Sodium (EYE DROPS OP) Apply 1 drop to eye as needed (Dry eyes) Indications: Systane       Boswellia-Glucosamine-Vit D (OSTEO BI-FLEX ONE PER DAY) TABS Take 1 tablet by mouth daily Indications: Nutritional Support      Multiple Vitamins-Minerals (CENTRUM SILVER ULTRA WOMENS) TABS Take 1 tablet by mouth daily Indications: Nutritional Support      vitamin B-12 (CYANOCOBALAMIN) 1000 MCG tablet Take 1,000 mcg by mouth daily Indications: Nutritional Support      citalopram (CELEXA) 20 MG tablet Take 20 mg by mouth daily Indications: Depression      SYNTHROID 88 MCG tablet Take 88 mcg by mouth daily Indications: Underactive Thyroid       No current facility-administered medications on file prior to visit. Review of Systems   Constitutional: Negative. Negative for chills, fatigue and fever. HENT: Negative. Negative for congestion, ear pain, sore throat and trouble swallowing. Eyes: Negative. Negative for visual disturbance. Respiratory: Negative. Negative for cough, shortness of breath and wheezing. Cardiovascular: Negative. Negative for chest pain, palpitations and leg swelling. Gastrointestinal:  Positive for abdominal pain (RLQ). Negative for abdominal distention, diarrhea, nausea and vomiting. RLQ abscess draining pink/tan fluid   Endocrine: Negative. Genitourinary: Negative. Negative for difficulty urinating, dysuria and hematuria. Musculoskeletal:  Positive for gait problem (walker). Negative for back pain. Skin: Negative. Negative for color change, rash and wound. Neurological:  Positive for weakness (generalized). Negative for dizziness, light-headedness, numbness and headaches. Hematological: Negative. Does not bruise/bleed easily. Psychiatric/Behavioral: Negative. The patient is not nervous/anxious. All other systems reviewed and are negative. 7/21/2022:   Impression   1. Successful drainage and drain placement of right colonic soft tissue mass with central fluid collection. 2.Fluid was aspirated and sent for microbiology and cytology analysis. Additionally a 10 Portuguese drain was placed yielding a thick red to yellow fluid with internal yellow debris. HISTORY: Alexandra Hill is a Female of 80 years age.        DIAGNOSIS:   Right abdominal mass with possible fluid collection vs necrotic tissue        COMPARISON: None available. CT Dose-Length Product (estimate related to radiation exposure from this exam):  541.6  mGy*cm. PROCEDURE:   Following the discussion of the procedure, alternatives, risks versus benefits, informed consent was obtained from the patient. Specifically, risks of after-biopsy pain at the site, rare possibility of excessive hemorrhage, infection, injury to the    adjacent organs were discussed and the patient verbalized understanding. Pre-procedure evaluation confirmed that the patient was an appropriate candidate for conscious    sedation. Adequate sedation was maintained during the entire procedure. Vital signs, pulse    oximetry, and response to verbal commands were monitored and recorded by the nurse throughout the    procedure and the recovery period. Medical information was entered in the medical record including the    medications and dosages used. The patient returned to baseline neurologic and physiologic status    prior to leaving the department. No immediate sedation related complications were noted. Medication    for conscious sedation was administered via IV route. 45 minutes of conscious sedation was    provided. Following universal protocol, patient and site verification was performed with a \"timeout\" prior to the procedure. The patient was placed on the CT table in supine  position and the right lateral abdomen area was prepped and draped in usual sterile fashion. Using the usual sterile conditions, lidocaine and CT guidance, the fluid collection within soft tissue density    was accessed using a Yueh needle. After confirmation of appropriate localization of the needle, aspiration yielded a thick red to yellow fluid which was sent for microbiology and cytology analysis. Following that an Amplatz wire was placed through the    Yueh sheath into the abscess under CT guidance. The tract was serially dilated with 6, 8, and 10 [place] dilators respectively. Following that a 10 Italian drainage catheter was advanced over the wire into the abscess under CT guidance and the anchoring    loop was formed. Catheter was sutured to the skin and and secured with Percufix device. The patient tolerated the procedure well. No immediate complications identified. The patient left the CT suite in supine position to the recovery room in stable    condition. Total local anesthetic used: lidocaine, approximately 15 mL. Estimated blood loss:  None. 8/9/2022:   Impression   1. THERE IS A THICK WALLED PERIPHERALLY ENHANCING complex SOFT TISSUE COLLECTION WITH AIR AS WELL AS A DRAINAGE CATHETER SUBJACENT TO THE PROXIMAL PORTION OF THE ASCENDING COLON, CECUM. LIKELY THAT OF THE ABSCESS. IT HAS SHOWN SOME INTERVAL DECREASE IN    SIZE as COMPARED TO THE PRIOR EXAMINATION. 2. LARGE CYSTIC ADNEXAL MASS. UNCHANGED. THE GALLBLADDER NEOPLASM IS NOT EXCLUDED. CONTINUED FURTHER EVALUATION    3. SMALL LEFT PLEURAL EFFUSION. OTHER FINDINGS DETAILED ABOVE           9/14/2022:   Narrative   EXAMINATION:   CT OF THE ABDOMEN AND PELVIS WITH CONTRAST 9/14/2022 11:54 am       TECHNIQUE:   CT of the abdomen and pelvis was performed with the administration of   intravenous contrast. Multiplanar reformatted images are provided for review. Automated exposure control, iterative reconstruction, and/or weight based   adjustment of the mA/kV was utilized to reduce the radiation dose to as low   as reasonably achievable. COMPARISON:   08/09/2022. HISTORY:   ORDERING SYSTEM PROVIDED HISTORY: RLQ pain. hx abscess/perc drain   TECHNOLOGIST PROVIDED HISTORY:   Reason for exam:->RLQ pain. hx abscess/perc drain   Additional Contrast?->None   Decision Support Exception - unselect if not a suspected or confirmed   emergency medical condition->Emergency Medical Condition (MA)       FINDINGS:   Images of the lower chest demonstrate an enlarged heart.   There is incomplete   visualization of a thoracic aortic aneurysm. Atelectasis is noted in the   lower lobe of the left lung. There is a tiny left pleural effusion. The   liver, spleen, pancreas, right and left kidneys, and both adrenal glands are   unchanged in appearance. An inflammatory mass demonstrating mixed soft   tissue and fluid attenuation with a percutaneous drainage catheter is again   visualized in the mid to lower abdomen on the right, adjacent to the   ascending colon. This finding measures 6.5 x 5.5 x 6.5 cm and has slightly   increased in size since the prior exam.  Adjacent infiltrative changes are   again noted. The urinary bladder appears unremarkable. The appendix is not   definitely seen. A 9 cm cystic mass is again visualized in the left adnexal   region. No free intra-abdominal air or ascites is identified. No abdominal   wall abnormality is noted. Impression   Redemonstration of an inflammatory mass showing mixed soft tissue and fluid   attenuation with a percutaneous drainage catheter in the mid to lower abdomen   on the right, adjacent to the ascending colon. This finding has slightly   increased in size since 08/09/2022. Stable large left adnexal cystic mass. Findings in the lower chest as described. 10/10/22:  Impression   1. The right-sided abscess drain has been completely pulled out of the abscess and now lies in the right abdominal subcutaneous soft tissues. 2.The right lower abdominal abscess has increased in size when compared to prior study dating September 14 likely due to accumulation of infectious fluid due to displacement of the drainage catheter. 3.Again seen is a large cystic mass in the lower pelvis.        COMPARISON: September 14, 2022       DIAGNOSIS: Right lower abdomen abscess       COMMENTS: None       TECHNIQUE: Spiral scanning of the chest, abdomen and pelvis was performed after  administration of intravenous contrast.       CT Dose-Length Product (estimate related to radiation exposure from this exam):  314.66  mGy*cm. FINDINGS:            ABDOMEN   The visualized portion of the lung bases demonstrates a small effusion in the left thoracic cavity. The liver is of normal size and attenuation without focal lesions. The spleen is unremarkable. Kidneys demonstrate prompt enhancement and excretion of contrast without signs of hydronephrosis or focal mass. The pancreas and adrenals are unremarkable. The upper abdominal bowel loops appear normal. Again seen is ectasia of the visualized abdominal aorta and tortuosity, unchanged from prior. 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 There are no signs of    upper abdominal fluid collections. PELVIS        Again seen is a large abscess in the right lower abdomen/pelvis abutting the cecum now measuring 6.6 x 9.4 in transverse and AP dimensions, previously measuring 6 x 6.7 and AP and transverse dimensions. The previously seen abscess drain has now been    pulled out of the abscess and lies in the subcutaneous soft tissues and is not contributing to any drainage of the abscess consistent with abscess enlargement. Again seen is a large cystic mass in the pelvis. OBJECTIVE:  BP (!) 141/83   Pulse 67   Ht 5' 2\" (1.575 m)   Wt 155 lb (70.3 kg)   BMI 28.35 kg/m²     Physical Exam  Constitutional:       General: She is not in acute distress. Appearance: Normal appearance. She is not ill-appearing. HENT:      Head: Normocephalic. Nose: No congestion. Cardiovascular:      Rate and Rhythm: Normal rate. Heart sounds: Normal heart sounds. Pulmonary:      Effort: Pulmonary effort is normal.   Abdominal:      General: Bowel sounds are normal.      Palpations: Abdomen is soft. Tenderness: There is abdominal tenderness (RLQ). Comments: RLQ abscess drain hanging from RLQ with gauze and tape keeping it adhered to skin.  Foul smelling drainage right lower abdomen/pelvis abutting the cecum now measuring 6.6 x 9.4 in transverse and AP dimensions, previously measuring 6 x 6.7 and AP and transverse dimensions. The previously seen abscess drain has now been    pulled out of the abscess and lies in the subcutaneous soft tissues and is not contributing to any drainage of the abscess consistent with abscess enlargement. Again seen is a large cystic mass in the pelvis. ASSESSMENT AND PLAN:    --  Abdominal CT done 10/19/ 22 reviewed myself and results discussed with Dr. Sameer Devine. Abscess drain noted to be completely out of RLQ abscess pocked and abscess remains and is larger. These results discussed with son and patient. Diagnosis Orders   1. Intra-abdominal abscess ([address]  CT ABSCESS DRAINAGE W CATH PLACEMENT S&I    CT GUIDED NEEDLE PLACEMENT      2. Abscess of abdominal cavity (HCC)  CT ABSCESS DRAINAGE W CATH PLACEMENT S&I    CT GUIDED NEEDLE PLACEMENT      3. Hx of drainage of abscess  CT ABSCESS DRAINAGE W CATH PLACEMENT S&I    CT GUIDED NEEDLE PLACEMENT      4. RLQ abdominal pain  CT ABSCESS DRAINAGE W CATH PLACEMENT S&I    CT GUIDED NEEDLE PLACEMENT          --  Stayfix dressing removed and new placed in office. After removal it is noted that abscess drain is completely pulled out and is staying intact by one small suture. There is small amount of blood purulent foul smelling drainage coming from drain tract site. Plan:     Orders Placed This Encounter   Procedures    CT ABSCESS DRAINAGE W CATH PLACEMENT S&I     Standing Status:   Future     Standing Expiration Date:   10/24/2023     Order Specific Question:   Reason for exam:     Answer:   RLQ abdominal fluid collection    CT GUIDED NEEDLE PLACEMENT     Standing Status:   Future     Standing Expiration Date:   10/24/2023     Order Specific Question:   Reason for exam:     Answer:   RLQ abdominal fluid collection        No orders of the defined types were placed in this encounter.     -- Notified by Dr. Felipe Friedman that due to being on ann to contact ID dept. ID dept, rom instructed to contact Dr. Jeraline Nyhan. After discussion with Dr. Jeraline Nyhan with ID, he would like RLQ abscess drain replaced and then patient to continue follow up care with Dr. Uli Lan as scheduled this Friday. --  Will need cardiac clearance to hold Xarelto as directed. --  If/once RLQ abscess drain is placed, continue care in IR dept. Every two weeks for drain care. --  Procedure with risks including infection, pain, bleeding, reaction to IV medications intraoperatively, discussed with son and patient and both wish to proceed if cleared. Total time spent for this encounter:  38  minutes.     Betsey Figueroa, APRBALBINA - CNP

## 2022-10-24 ENCOUNTER — OFFICE VISIT (OUTPATIENT)
Dept: INTERVENTIONAL RADIOLOGY/VASCULAR | Age: 87
End: 2022-10-24
Payer: MEDICARE

## 2022-10-24 VITALS
BODY MASS INDEX: 28.52 KG/M2 | HEART RATE: 67 BPM | HEIGHT: 62 IN | DIASTOLIC BLOOD PRESSURE: 83 MMHG | SYSTOLIC BLOOD PRESSURE: 141 MMHG | WEIGHT: 155 LBS

## 2022-10-24 DIAGNOSIS — Z98.890 HX OF DRAINAGE OF ABSCESS: ICD-10-CM

## 2022-10-24 DIAGNOSIS — R10.31 RLQ ABDOMINAL PAIN: ICD-10-CM

## 2022-10-24 DIAGNOSIS — K65.1 ABSCESS OF ABDOMINAL CAVITY (HCC): ICD-10-CM

## 2022-10-24 DIAGNOSIS — K65.1 INTRA-ABDOMINAL ABSCESS (HCC): Primary | ICD-10-CM

## 2022-10-24 PROCEDURE — 1036F TOBACCO NON-USER: CPT | Performed by: NURSE PRACTITIONER

## 2022-10-24 PROCEDURE — G8427 DOCREV CUR MEDS BY ELIG CLIN: HCPCS | Performed by: NURSE PRACTITIONER

## 2022-10-24 PROCEDURE — 1123F ACP DISCUSS/DSCN MKR DOCD: CPT | Performed by: NURSE PRACTITIONER

## 2022-10-24 PROCEDURE — G8417 CALC BMI ABV UP PARAM F/U: HCPCS | Performed by: NURSE PRACTITIONER

## 2022-10-24 PROCEDURE — 1090F PRES/ABSN URINE INCON ASSESS: CPT | Performed by: NURSE PRACTITIONER

## 2022-10-24 PROCEDURE — 99214 OFFICE O/P EST MOD 30 MIN: CPT | Performed by: NURSE PRACTITIONER

## 2022-10-24 PROCEDURE — G8484 FLU IMMUNIZE NO ADMIN: HCPCS | Performed by: NURSE PRACTITIONER

## 2022-10-26 ENCOUNTER — TELEPHONE (OUTPATIENT)
Dept: INTERVENTIONAL RADIOLOGY/VASCULAR | Age: 87
End: 2022-10-26

## 2022-10-26 ENCOUNTER — TELEMEDICINE (OUTPATIENT)
Dept: INTERVENTIONAL RADIOLOGY/VASCULAR | Age: 87
End: 2022-10-26
Payer: MEDICARE

## 2022-10-26 DIAGNOSIS — I10 ESSENTIAL HYPERTENSION: ICD-10-CM

## 2022-10-26 DIAGNOSIS — I71.21 ANEURYSM OF ASCENDING AORTA WITHOUT RUPTURE: ICD-10-CM

## 2022-10-26 DIAGNOSIS — Z98.890 HX OF DRAINAGE OF ABSCESS: ICD-10-CM

## 2022-10-26 DIAGNOSIS — J81.0 ACUTE PULMONARY EDEMA (HCC): ICD-10-CM

## 2022-10-26 DIAGNOSIS — K65.1 ABSCESS OF ABDOMINAL CAVITY (HCC): ICD-10-CM

## 2022-10-26 DIAGNOSIS — R19.00 ABDOMINAL MASS, UNSPECIFIED ABDOMINAL LOCATION: ICD-10-CM

## 2022-10-26 DIAGNOSIS — K65.1 INTRA-ABDOMINAL ABSCESS (HCC): Primary | ICD-10-CM

## 2022-10-26 DIAGNOSIS — M62.81 MUSCLE WEAKNESS (GENERALIZED): ICD-10-CM

## 2022-10-26 DIAGNOSIS — I74.10 AORTIC THROMBUS (HCC): ICD-10-CM

## 2022-10-26 DIAGNOSIS — I50.30 DIASTOLIC CONGESTIVE HEART FAILURE, UNSPECIFIED HF CHRONICITY (HCC): ICD-10-CM

## 2022-10-26 DIAGNOSIS — Z79.01 ON ANTICOAGULANT THERAPY: ICD-10-CM

## 2022-10-26 DIAGNOSIS — K65.1 ABSCESS OF ABDOMINAL CAVITY (HCC): Primary | ICD-10-CM

## 2022-10-26 DIAGNOSIS — K65.1 INTRA-ABDOMINAL ABSCESS (HCC): ICD-10-CM

## 2022-10-26 DIAGNOSIS — R10.31 RLQ ABDOMINAL PAIN: ICD-10-CM

## 2022-10-26 DIAGNOSIS — I48.91 ATRIAL FIBRILLATION, UNSPECIFIED TYPE (HCC): ICD-10-CM

## 2022-10-26 LAB
ALBUMIN SERPL-MCNC: 2.9 G/DL (ref 3.5–4.6)
ALP BLD-CCNC: 74 U/L (ref 40–130)
ALT SERPL-CCNC: 73 U/L (ref 0–33)
ANION GAP SERPL CALCULATED.3IONS-SCNC: 10 MEQ/L (ref 9–15)
AST SERPL-CCNC: 62 U/L (ref 0–35)
BASOPHILS ABSOLUTE: 0.1 K/UL (ref 0–0.2)
BASOPHILS RELATIVE PERCENT: 0.9 %
BILIRUB SERPL-MCNC: 0.5 MG/DL (ref 0.2–0.7)
BUN BLDV-MCNC: 14 MG/DL (ref 8–23)
C-REACTIVE PROTEIN: 42.9 MG/L (ref 0–5)
CALCIUM SERPL-MCNC: 8.5 MG/DL (ref 8.5–9.9)
CHLORIDE BLD-SCNC: 102 MEQ/L (ref 95–107)
CO2: 26 MEQ/L (ref 20–31)
CREAT SERPL-MCNC: 0.48 MG/DL (ref 0.5–0.9)
EOSINOPHILS ABSOLUTE: 0.2 K/UL (ref 0–0.7)
EOSINOPHILS RELATIVE PERCENT: 3.1 %
GFR SERPL CREATININE-BSD FRML MDRD: >60 ML/MIN/{1.73_M2}
GLOBULIN: 3.3 G/DL (ref 2.3–3.5)
GLUCOSE BLD-MCNC: 115 MG/DL (ref 70–99)
HCT VFR BLD CALC: 32.3 % (ref 37–47)
HEMOGLOBIN: 10.5 G/DL (ref 12–16)
LYMPHOCYTES ABSOLUTE: 0.7 K/UL (ref 1–4.8)
LYMPHOCYTES RELATIVE PERCENT: 12.1 %
MCH RBC QN AUTO: 26.7 PG (ref 27–31.3)
MCHC RBC AUTO-ENTMCNC: 32.6 % (ref 33–37)
MCV RBC AUTO: 82 FL (ref 79.4–94.8)
MONOCYTES ABSOLUTE: 0.6 K/UL (ref 0.2–0.8)
MONOCYTES RELATIVE PERCENT: 10.5 %
NEUTROPHILS ABSOLUTE: 4.3 K/UL (ref 1.4–6.5)
NEUTROPHILS RELATIVE PERCENT: 73.4 %
PDW BLD-RTO: 16.5 % (ref 11.5–14.5)
PLATELET # BLD: 198 K/UL (ref 130–400)
POTASSIUM SERPL-SCNC: 3.2 MEQ/L (ref 3.4–4.9)
RBC # BLD: 3.95 M/UL (ref 4.2–5.4)
SODIUM BLD-SCNC: 138 MEQ/L (ref 135–144)
TOTAL PROTEIN: 6.2 G/DL (ref 6.3–8)
WBC # BLD: 5.9 K/UL (ref 4.8–10.8)

## 2022-10-26 PROCEDURE — 99215 OFFICE O/P EST HI 40 MIN: CPT | Performed by: NURSE PRACTITIONER

## 2022-10-26 PROCEDURE — 1090F PRES/ABSN URINE INCON ASSESS: CPT | Performed by: NURSE PRACTITIONER

## 2022-10-26 PROCEDURE — G8417 CALC BMI ABV UP PARAM F/U: HCPCS | Performed by: NURSE PRACTITIONER

## 2022-10-26 PROCEDURE — 1123F ACP DISCUSS/DSCN MKR DOCD: CPT | Performed by: NURSE PRACTITIONER

## 2022-10-26 PROCEDURE — 1036F TOBACCO NON-USER: CPT | Performed by: NURSE PRACTITIONER

## 2022-10-26 PROCEDURE — 99417 PROLNG OP E/M EACH 15 MIN: CPT | Performed by: NURSE PRACTITIONER

## 2022-10-26 PROCEDURE — G8428 CUR MEDS NOT DOCUMENT: HCPCS | Performed by: NURSE PRACTITIONER

## 2022-10-26 PROCEDURE — G8484 FLU IMMUNIZE NO ADMIN: HCPCS | Performed by: NURSE PRACTITIONER

## 2022-10-26 NOTE — PROGRESS NOTES
Chief Complaint   Patient presents with    Drain check/removal         TELEHEALTH EVALUATION -- Audio/Visual (During TRUKQ-87 public health emergency)      Loida Abreu, was evaluated through a synchronous (real-time) audio-video encounter. The patient (or guardian if applicable) is aware that this is a billable service, which includes applicable co-pays. This Virtual Visit was conducted with patient's (and/or legal guardian's) consent. The visit was conducted pursuant to the emergency declaration under the [address] [place] [address], [address] [place] [address] authority and the [employer] Act. Patient identification was verified, and a caregiver was present when appropriate. The patient was located in a state where the provider was licensed to provide care. Due to COVID 19 outbreak, patient's office visit was converted to a virtual visit. Patient was contacted and agreed to proceed with a virtual visit via Telephone Visit  The risks and benefits of converting to a virtual visit were discussed in light of the current infectious disease epidemic. Patient also understood that insurance coverage and co-pays are up to their individual insurance plans. 10/26/2022:     Loida Abreu (:  1932) has requested an audio/video evaluation for the following concern(s):    See below for VVT discussion.          Family History   Problem Relation Age of Onset    Arthritis Mother     Arthritis Father     High Blood Pressure Father        Past Surgical History:   Procedure Laterality Date    IR INS PICC VAD W SQ PORT GREATER THAN 5  2022    IR INS PICC VAD W SQ PORT GREATER THAN 5 2022 [facility] SPECIAL PROCEDURE    JOINT REPLACEMENT Bilateral     knees    OTHER SURGICAL HISTORY Right 2022    cat scan guided abdominal mass aspiration with drain placement by Dr. Viki Parker Left 2021    [facility]    total of 755 cc removed per Dr Lopez Gave specimen sent to lab       Past Medical History:   Diagnosis Date    Ascending aortic aneurysm ([facility] 6/23/2017    Charcot Arleen Tooth muscular atrophy     CHF (congestive heart failure) ([facility])     Chronic diastolic CHF (congestive heart failure) ([facility] 4/1/2022    Depression     Descending aortic aneurysm ([facility] 6/23/2017    Essential hypertension 2/16/2018    Headache     HTN (hypertension)     Impaired mobility and activities of daily living     Lumbar stenosis with neurogenic claudication     Myelopathy ([facility]     Osteoarthritis        Social History     Socioeconomic History    Marital status:      Number of children: 2   Tobacco Use    Smoking status: Never    Smokeless tobacco: Never   Vaping Use    Vaping Use: Never used   Substance and Sexual Activity    Alcohol use: No     Alcohol/week: 0.0 standard drinks    Drug use: No    Sexual activity: Not Currently   Social History Narrative    , Lives With: Alone, son lives down the street, dtr is in the area    Type of Home: [address]: One level    Home Access: Stairs to enter with rails- Number of Steps: 2- Rails: Both    Bathroom Shower/Tub: Tub/Shower unit, Bathroom Equipment: Grab bars in shower, Shower chair    Home Equipment: Rolling walker, Cane(Pt infrequemtly uses DME for ambulation and prefers to furniture walk in home)    ADL Assistance: Independent, [address]: [facility] [address] Responsibilities: Yes    Ambulation Assistance: Independent, Transfer Assistance: Independent    Additional Comments: Son stops over 2 times daily           Allergies   Allergen Reactions    Latex     Tape [Adhesive Tape]        Current Outpatient Medications on File Prior to Visit   Medication Sig Dispense Refill    rivaroxaban (XARELTO) 15 MG TABS tablet Take 1 tablet by mouth daily 90 tablet 3    meclizine (ANTIVERT) 25 MG tablet       acetaminophen (TYLENOL) 325 MG tablet Take 650 mg by mouth every 6 hours as needed for Pain      Vitamin D (CHOLECALCIFEROL) 50 MCG (2000 UT) TABS tablet Take 1 tablet by mouth Daily with supper (Patient taking differently: Take 2,000 Units by mouth Daily with supper Indications: Nutritional Support) 60 tablet 5    potassium chloride (KLOR-CON M) 20 MEQ extended release tablet Take 20 mEq by mouth daily (with breakfast) Indications: Nutritional Support      furosemide (LASIX) 20 MG tablet TAKE 1 TABLET DAILY (Patient taking differently: Take 20 mg by mouth daily Indications: Congestive Heart Failure) 90 tablet 3    [DISCONTINUED] digoxin (LANOXIN) 125 MCG tablet TAKE 1 TABLET DAILY 90 tablet 3    [DISCONTINUED] amLODIPine (NORVASC) 5 MG tablet Take 1 tablet by mouth daily 90 tablet 3    pantoprazole (PROTONIX) 40 MG tablet TAKE 1 TABLET DAILY (Patient taking differently: Take 40 mg by mouth daily Indications: Ulcer) 90 tablet 3    carvedilol (COREG) 6.25 MG tablet TAKE 1 TABLET TWICE A DAY (Patient taking differently: Take 6.25 mg by mouth 2 times daily Indications: High Blood Pressure Disorder) 180 tablet 3    nitroGLYCERIN (NITROSTAT) 0.4 MG SL tablet up to max of 3 total doses. If no relief after 1 dose, call 911. (Patient taking differently: Place 0.4 mg under the tongue every 5 minutes as needed Indications: Chest Pain up to max of 3 total doses. If no relief after 1 dose, call 911.) 25 tablet 3    budesonide-formoterol (SYMBICORT) 160-4.5 MCG/ACT AERO Inhale 2 puffs into the lungs 2 times daily (Patient taking differently: Inhale 2 puffs into the lungs in the morning and 2 puffs in the evening.  Indications: Difficulty Breathing.) 1 Inhaler 3    ferrous sulfate (IRON 325) 325 (65 Fe) MG tablet Take 1 tablet by mouth 2 times daily (with meals) (Patient taking differently: Take 325 mg by mouth 2 times daily (with meals) Indications: Anemia) 30 tablet 3 Carboxymethylcellulose Sodium (EYE DROPS OP) Apply 1 drop to eye as needed (Dry eyes) Indications: Systane       Boswellia-Glucosamine-Vit D (OSTEO BI-FLEX ONE PER DAY) TABS Take 1 tablet by mouth daily Indications: Nutritional Support      Multiple Vitamins-Minerals (CENTRUM SILVER ULTRA WOMENS) TABS Take 1 tablet by mouth daily Indications: Nutritional Support      vitamin B-12 (CYANOCOBALAMIN) 1000 MCG tablet Take 1,000 mcg by mouth daily Indications: Nutritional Support      citalopram (CELEXA) 20 MG tablet Take 20 mg by mouth daily Indications: Depression      SYNTHROID 88 MCG tablet Take 88 mcg by mouth daily Indications: Underactive Thyroid       No current facility-administered medications on file prior to visit. [unfilled]    Prior to Visit Medications    Medication Sig Taking?  Authorizing Provider   rivaroxaban (XARELTO) 15 MG TABS tablet Take 1 tablet by mouth daily  Orvil Simpler ADRIENNE Rosas CNP   meclizine (ANTIVERT) 25 MG tablet   Historical Provider, MD   acetaminophen (TYLENOL) 325 MG tablet Take 650 mg by mouth every 6 hours as needed for Pain  Historical Provider, MD   Vitamin D (CHOLECALCIFEROL) 50 MCG (2000 UT) TABS tablet Take 1 tablet by mouth Daily with supper  Patient taking differently: Take 2,000 Units by mouth Daily with supper Indications: Nutritional Support  Deb Oseguera,    potassium chloride (KLOR-CON M) 20 MEQ extended release tablet Take 20 mEq by mouth daily (with breakfast) Indications: Nutritional Support  Historical Provider, MD   furosemide (LASIX) 20 MG tablet TAKE 1 TABLET DAILY  Patient taking differently: Take 20 mg by mouth daily Indications: Congestive Heart Failure  Orvil Simpler Repko, APRN - CNP   digoxin (LANOXIN) 125 MCG tablet TAKE 1 TABLET DAILY  ADRIENNE Colvin CNP   amLODIPine (NORVASC) 5 MG tablet Take 1 tablet by mouth daily  Jorge Franco, DO   pantoprazole (PROTONIX) 40 MG tablet TAKE 1 TABLET DAILY  Patient taking differently: Take 40 mg by mouth daily Indications: Ulcer  Genetta Ring ADRIENNE Rosas CNP   carvedilol (COREG) 6.25 MG tablet TAKE 1 TABLET TWICE A DAY  Patient taking differently: Take 6.25 mg by mouth 2 times daily Indications: High Blood Pressure Disorder  Gracy A ADRIENNE Rosas CNP   nitroGLYCERIN (NITROSTAT) 0.4 MG SL tablet up to max of 3 total doses. If no relief after 1 dose, call 911. Patient taking differently: Place 0.4 mg under the tongue every 5 minutes as needed Indications: Chest Pain up to max of 3 total doses. If no relief after 1 dose, call 911. ADRIENNE Hicks NP   budesonide-formoterol (SYMBICORT) 160-4.5 MCG/ACT AERO Inhale 2 puffs into the lungs 2 times daily  Patient taking differently: Inhale 2 puffs into the lungs in the morning and 2 puffs in the evening. Indications: Difficulty Breathing.   ADRIENNE Hicks NP   ferrous sulfate (IRON 325) 325 (65 Fe) MG tablet Take 1 tablet by mouth 2 times daily (with meals)  Patient taking differently: Take 325 mg by mouth 2 times daily (with meals) Indications: Anemia  ADRIENNE Hicks NP   Carboxymethylcellulose Sodium (EYE DROPS OP) Apply 1 drop to eye as needed (Dry eyes) Indications: Systane   Historical Provider, MD   Boswellia-Glucosamine-Vit D (OSTEO BI-FLEX ONE PER DAY) TABS Take 1 tablet by mouth daily Indications: Nutritional Support  Historical Provider, MD   Multiple Vitamins-Minerals (CENTRUM SILVER ULTRA WOMENS) TABS Take 1 tablet by mouth daily Indications: Nutritional Support  Historical Provider, MD   vitamin B-12 (CYANOCOBALAMIN) 1000 MCG tablet Take 1,000 mcg by mouth daily Indications: Nutritional Support  Historical Provider, MD   citalopram (CELEXA) 20 MG tablet Take 20 mg by mouth daily Indications: Depression  Historical Provider, MD   SYNTHROID 88 MCG tablet Take 88 mcg by mouth daily Indications: Underactive Thyroid  Historical Provider, MD       Social History     Tobacco Use    Smoking status: Never    Smokeless tobacco: Never Vaping Use    Vaping Use: Never used   Substance Use Topics    Alcohol use: No     Alcohol/week: 0.0 standard drinks    Drug use: No          PHYSICAL EXAMINATION:  [ INSTRUCTIONS:  \"[x]\" Indicates a positive item  \"[]\" Indicates a negative item  -- DELETE ALL ITEMS NOT EXAMINED]  [x] Alert  [x] Oriented to person/place/time    [x] No apparent distress  [] Toxic appearing    Patient speaking in full sentences. No apparent distress  Mood and behavior appropriate    [] Face flushed appearing [] Sclera clear  [] Lips are cyanotic      [x] Breathing appears normal  [] Appears tachypneic      [] Rash on visible skin    [] Cranial Nerves II-XII grossly intact    [] Motor grossly intact in visible upper extremities    [] Motor grossly intact in visible lower extremities    [x] Normal Mood  [] Anxious appearing    [] Depressed appearing  [] Confused appearing      [] Poor short term memory  [] Poor long term memory    [] OTHER:      Due to this being a TeleHealth encounter, evaluation of the following organ systems is limited: Vitals/Constitutional/EENT/Resp/CV/GI//MS/Neuro/Skin/Heme-Lymph-Imm. ASSESSMENT AND PLAN:    Chart review as noted of referring HCP last OV. Dr Lisa Pfeiffer August 2022 admission. Medication list reviewed for pre operative examination. Labs reviewed. Diagnosis Orders   1. Intra-abdominal abscess ([address]  CT ABSCESS DRAINAGE W CATH PLACEMENT S&I    CT GUIDED NEEDLE PLACEMENT    Protime-INR      2. Abscess of abdominal cavity (HCC)  CT ABSCESS DRAINAGE W CATH PLACEMENT S&I    CT GUIDED NEEDLE PLACEMENT    Protime-INR      3. Hx of drainage of abscess  CT ABSCESS DRAINAGE W CATH PLACEMENT S&I    CT GUIDED NEEDLE PLACEMENT    Protime-INR      4. Abdominal mass, unspecified abdominal location  CT ABSCESS DRAINAGE W CATH PLACEMENT S&I    CT GUIDED NEEDLE PLACEMENT    Protime-INR      5.  Aneurysm of ascending aorta without rupture  CT ABSCESS DRAINAGE W CATH PLACEMENT S&I    CT GUIDED NEEDLE PLACEMENT    Protime-INR      6. Atrial fibrillation, unspecified type ([address]  CT ABSCESS DRAINAGE W CATH PLACEMENT S&I    CT GUIDED NEEDLE PLACEMENT    Protime-INR      7. Diastolic congestive heart failure, unspecified HF chronicity (HCC)  CT ABSCESS DRAINAGE W CATH PLACEMENT S&I    CT GUIDED NEEDLE PLACEMENT    Protime-INR      8. Aortic thrombus (HCC)  CT ABSCESS DRAINAGE W CATH PLACEMENT S&I    CT GUIDED NEEDLE PLACEMENT    Protime-INR      9. Muscle weakness (generalized)  CT ABSCESS DRAINAGE W CATH PLACEMENT S&I    CT GUIDED NEEDLE PLACEMENT    Protime-INR      10. Essential hypertension  CT ABSCESS DRAINAGE W CATH PLACEMENT S&I    CT GUIDED NEEDLE PLACEMENT    Protime-INR      11. Acute pulmonary edema (HCC)  CT ABSCESS DRAINAGE W CATH PLACEMENT S&I    CT GUIDED NEEDLE PLACEMENT    Protime-INR      12. On anticoagulant therapy  CT ABSCESS DRAINAGE W CATH PLACEMENT S&I    CT GUIDED NEEDLE PLACEMENT    Protime-INR      13. RLQ abdominal pain  CT ABSCESS DRAINAGE W CATH PLACEMENT S&I    CT GUIDED NEEDLE PLACEMENT    Protime-INR              Per discussion with Dr. Geo King with ID, RLQ abscess remains and is larger with multiple septations. This has been ongoing since July with minimal improvement with IV ATB's. Patient fell and pulled out absess drain. She is requesting drain be percutaneously put back in and if possible two drains placed into the two largest pockets. Discussed that she and her son were told she is not a surgical candidate by general surgery Dr. Asher Mast due to her high risk. H/O PE, DVT, PAF on [address], aortic thrombus, and AAA greater than 5.0 cm. Dr. Maribell Goldberg is to see patient this Friday for follow up. Primary ID MD is Dr. Joss granados on vacation. Spoke with general surgery Dr. Lona Lim and again states patient is not a surgical candidate at [facility] due to high risk of mortality and morbidity given extensive medical history.  States he discussed this with her son in August and told them to have second opinion at [facility] due to her high risk. Spoke with Dr. Kevin Tran regarding recent abdominal CT and states there are multiple septations of which each are very small and he therefore recommends only one abscess drain as all other septations are too small for multiple drain placement and will only place one drain. He recommends as well she have a second opinion for surgery at tertiary [facility] hospital.   Spoke with son Justino Oviedo who is primary care provider regarding above. Informed him that it is recommended by all that patient have second opinion at [facility] for surgical treatment of RLQ abscess due to her high risk of morbidity and mortality and recommend placement of abscess drain and follow up care with ID until she is seen by [facility] for further treatment. It is most likely this RLQ abscess will not improve with current treatment. He spoke with patient and they are both in agreement with this POC. Patient will be scheduled for RLQ abscess drain placement, one drain, holding Xarelto as directed, NPO after MN night before and hold medications as directed. IR office will call him with appt schedule. States he did not get second opinion at [facility] as he was told she would need most likely a colostomy and patient did not want this. Referral made to [facility] general surgery and IR office to fax referral to [facility]. [facility] to call and make appt. Orders placed for abscess drain to be replaced with Dr. Kevin Tran. 10/26/2022      Plan:     Orders Placed This Encounter   Procedures    External Referral to General Surgery     Referral Priority:   Routine     Referral Type:   Eval and Treat     Referral Reason:   Specialty Services Required     Requested Specialty:   General Surgery     Number of Visits Requested:   1      No orders of the defined types were placed in this encounter.      Orders Placed This Encounter   Procedures    CT ABSCESS DRAINAGE W CATH PLACEMENT S&I     Standing Status:   Future     Standing Expiration Date: 10/26/2023    CT GUIDED NEEDLE PLACEMENT     Standing Status:   Future     Standing Expiration Date:   10/26/2023    Protime-INR     Standing Status:   Future     Standing Expiration Date:   10/26/2023     Order Specific Question:   Daily Coumadin Dose? Answer:   . ............... --  Hold/resume medications as directed. --  Office follow up every two weeks until drain removed. Pursuant to the emergency declaration under the [address] [place] [address] authority and the [employer] Act, this Virtual  Visit was conducted, with patient's consent, to reduce the patient's risk of exposure to COVID-19 and provide continuity of care for an established patient. Services were provided through a video synchronous discussion virtually to substitute for in-person clinic visit. This billable evisit was conducted via Telehealth over phone or with video visit with doxy. me from [address] [place] [address], [address], via myself and the patient. It was explained to patient purpose of Telehealth visit to limit face to face contact due to Coronavirus mandates now in place. Patient verbalized agreement to participate in a billable Telehealth phone visit. Nacho Pollard, was evaluated through a synchronous (real-time) audio-video encounter. The patient (or guardian if applicable) is aware that this is a billable service, which includes applicable co-pays. This Virtual Visit was conducted with patient's (and/or legal guardian's) consent. The visit was conducted pursuant to the emergency declaration under the [address] [place] [address], [address] [place] [address] authority and the [employer] Act. Patient identification was verified, and a caregiver was present when appropriate.    The patient was [phone]. Provider was located at [facility] ([place] [address]): [address]. Total time spent for this encounter: 55 minutes. --ADRIENNE Caal - CNP on 10/26/2022 at 4:16 PM    An electronic signature was used to authenticate this note.

## 2022-10-28 ENCOUNTER — OFFICE VISIT (OUTPATIENT)
Dept: INFECTIOUS DISEASES | Age: 87
End: 2022-10-28
Payer: MEDICARE

## 2022-10-28 ENCOUNTER — TELEPHONE (OUTPATIENT)
Dept: INFECTIOUS DISEASES | Age: 87
End: 2022-10-28

## 2022-10-28 VITALS — TEMPERATURE: 97.7 F | SYSTOLIC BLOOD PRESSURE: 138 MMHG | DIASTOLIC BLOOD PRESSURE: 88 MMHG | HEART RATE: 74 BPM

## 2022-10-28 DIAGNOSIS — I50.30 DIASTOLIC CONGESTIVE HEART FAILURE, UNSPECIFIED HF CHRONICITY (HCC): ICD-10-CM

## 2022-10-28 DIAGNOSIS — K65.1 INTRA-ABDOMINAL ABSCESS (HCC): ICD-10-CM

## 2022-10-28 DIAGNOSIS — I10 ESSENTIAL HYPERTENSION: ICD-10-CM

## 2022-10-28 DIAGNOSIS — R19.00 ABDOMINAL MASS, UNSPECIFIED ABDOMINAL LOCATION: ICD-10-CM

## 2022-10-28 DIAGNOSIS — I71.21 ANEURYSM OF ASCENDING AORTA WITHOUT RUPTURE: ICD-10-CM

## 2022-10-28 DIAGNOSIS — K65.1 ABSCESS OF ABDOMINAL CAVITY (HCC): ICD-10-CM

## 2022-10-28 DIAGNOSIS — Z98.890 HX OF DRAINAGE OF ABSCESS: ICD-10-CM

## 2022-10-28 DIAGNOSIS — M62.81 MUSCLE WEAKNESS (GENERALIZED): ICD-10-CM

## 2022-10-28 DIAGNOSIS — A49.1 STREPTOCOCCUS VIRIDANS INFECTION: ICD-10-CM

## 2022-10-28 DIAGNOSIS — I48.91 ATRIAL FIBRILLATION, UNSPECIFIED TYPE (HCC): ICD-10-CM

## 2022-10-28 DIAGNOSIS — R10.31 RLQ ABDOMINAL PAIN: ICD-10-CM

## 2022-10-28 DIAGNOSIS — K65.1 INTRA-ABDOMINAL ABSCESS (HCC): Primary | ICD-10-CM

## 2022-10-28 DIAGNOSIS — I74.10 AORTIC THROMBUS (HCC): ICD-10-CM

## 2022-10-28 DIAGNOSIS — Z79.01 ON ANTICOAGULANT THERAPY: ICD-10-CM

## 2022-10-28 DIAGNOSIS — J81.0 ACUTE PULMONARY EDEMA (HCC): ICD-10-CM

## 2022-10-28 LAB
ANION GAP SERPL CALCULATED.3IONS-SCNC: 8 MEQ/L (ref 9–15)
BUN BLDV-MCNC: 14 MG/DL (ref 8–23)
CALCIUM SERPL-MCNC: 8.7 MG/DL (ref 8.5–9.9)
CHLORIDE BLD-SCNC: 101 MEQ/L (ref 95–107)
CO2: 29 MEQ/L (ref 20–31)
CREAT SERPL-MCNC: 0.43 MG/DL (ref 0.5–0.9)
GFR SERPL CREATININE-BSD FRML MDRD: >60 ML/MIN/{1.73_M2}
GLUCOSE BLD-MCNC: 103 MG/DL (ref 70–99)
INR BLD: 1.5
POTASSIUM SERPL-SCNC: 3.7 MEQ/L (ref 3.4–4.9)
PROTHROMBIN TIME: 18.5 SEC (ref 12.3–14.9)
SODIUM BLD-SCNC: 138 MEQ/L (ref 135–144)

## 2022-10-28 PROCEDURE — G8428 CUR MEDS NOT DOCUMENT: HCPCS | Performed by: INTERNAL MEDICINE

## 2022-10-28 PROCEDURE — 1090F PRES/ABSN URINE INCON ASSESS: CPT | Performed by: INTERNAL MEDICINE

## 2022-10-28 PROCEDURE — G8417 CALC BMI ABV UP PARAM F/U: HCPCS | Performed by: INTERNAL MEDICINE

## 2022-10-28 PROCEDURE — G8484 FLU IMMUNIZE NO ADMIN: HCPCS | Performed by: INTERNAL MEDICINE

## 2022-10-28 PROCEDURE — 1123F ACP DISCUSS/DSCN MKR DOCD: CPT | Performed by: INTERNAL MEDICINE

## 2022-10-28 PROCEDURE — 1036F TOBACCO NON-USER: CPT | Performed by: INTERNAL MEDICINE

## 2022-10-28 PROCEDURE — 99214 OFFICE O/P EST MOD 30 MIN: CPT | Performed by: INTERNAL MEDICINE

## 2022-10-28 ASSESSMENT — ENCOUNTER SYMPTOMS
ABDOMINAL PAIN: 1
DIARRHEA: 1

## 2022-10-28 NOTE — PROGRESS NOTES
José Gallo (:  1932) is a 80 y.o. female,Established patient, here for evaluation of the following chief complaint(s):  Abscess (Intra abdominal abscess )         ASSESSMENT/PLAN:  Pelvic abscess with lack of improvement despite prolonged course of IV Invanz with strep viridans status post CT-guided drainage  Diarrhea, rule out C. Difficile    Change Invanz to IV Zosyn for possible pseudomonal infection  Agree with CT-guided drain placement and culture  Follow-up with surgery  Follow-up CBC BMP and CRP  Stools for C. difficile   Probiotics   I had a lengthy discussion with the patient and her son about further intervention    SUBJECTIVE/OBJECTIVE:  HPI  Follow-up [facility] hospitalization for pelvic abscess. On IV Invanz that is well-tolerated. Patient's lower abdominal drain fell on Monday. Follow-up CT showed loculated multiple abscesses, increased in size. Patient was felt to be nonsurgical candidate because of underlying cardiac condition. She is currently on home oxygen. In a wheelchair. Son reported that patient was referred to [facility] clinic by interventional radiology for second opinion. Patient reports moderate left-sided abdominal pain, related to a fall. Currently in a wheelchair with positive leg weakness. Decreased appetite. No fevers or chills. Positive diarrhea  Review of Systems   Constitutional:  Positive for appetite change. Gastrointestinal:  Positive for abdominal pain and diarrhea. All other systems reviewed and are negative. Physical Exam  Vitals and nursing note reviewed. Constitutional:       General: She is not in acute distress. Appearance: Normal appearance. HENT:      Head: Normocephalic. Nose: No congestion. Eyes:      General: No scleral icterus. Cardiovascular:      Rate and Rhythm: Normal rate. Pulmonary:      Effort: Pulmonary effort is normal. No respiratory distress.    Abdominal:      General: Abdomen is flat. There is no distension. Tenderness: There is abdominal tenderness (.  Direct right lower quadrant tenderness). Musculoskeletal:      Right lower leg: No edema. Left lower leg: No edema. Skin:     Coloration: Skin is not jaundiced. Findings: No erythema or rash. Neurological:      General: No focal deficit present. Mental Status: She is alert and oriented to person, place, and time. Psychiatric:         Mood and Affect: Mood normal.         Behavior: Behavior normal.        ([phone]   P   OrganismStreptococcus viridans group Abnormal  P Culture Surgical   LIGHT GROWTH   Susceptibilities have not been performed. Notify the   laboratory within 7 days for further workup if clinically   indicated.         0 Result Notes  Component 8/16/22 1944    CULTURE WOUND Direct Exam:  RARE NEUTROPHILS   Direct Exam:  NO ORGANISMS SEEN   Cult,Aerobe/Anaerobe:  NO GROWTH 5 DAYS           0 Result Notes  Component Ref Range & Units 10/26/22 1030 10/19/22 1030 10/11/22 1003 10/4/22 1359 9/27/22 1110 9/14/22 1045 9/2/22 0620   WBC 4.8 - 10.8 K/uL 5.9  6.1  5.1  5.8  7.0  8.0  6.0    RBC 4.20 - 5.40 M/uL 3.95 Low   3.87 Low   3.90 Low   3.87 Low   4.12 Low   4.29  3.91 Low     Hemoglobin 12.0 - 16.0 g/dL 10.5 Low   10.2 Low   10.5 Low   10.5 Low   11.1 Low   11.5 Low   10.6 Low     Hematocrit 37.0 - 47.0 % 32.3 Low   32.1 Low   32.1 Low   31.8 Low   33.5 Low   34.9 Low   32.1 Low     MCV 79.4 - 94.8 fL 82.0  83.0  82.2 R  82.3 R  81.3 Low  R  81.4 Low  R  82.1 R    MCH 27.0 - 31.3 pg 26.7 Low   26.3 Low   27.0  27.0  26.9 Low   26.9 Low   27.2    MCHC 33.0 - 37.0 % 32.6 Low   31.7 Low   32.8 Low   32.8 Low   33.1  33.0  33.1    RDW 11.5 - 14.5 % 16.5 High   16.7 High   16.3 High   16.8 High   16.7 High   16.2 High   16.2 High     Platelets 876 - 980 K/uL 198  180  224  210  199  223  195    Neutrophils % % 73.4  73.3  70.              0 Result Notes  Component Ref Range & Units 10/26/22 1030 10/19/22 1030 10/11/22 1030 10/4/22 1359 9/27/22 1110 9/14/22 1045 9/2/22 0620   Sodium 135 - 144 mEq/L 138  141  144  142  139  140  142    Potassium 3.4 - 4.9 mEq/L 3.2 Low   3.7  3.5  3.9  3.7  3.9  3.5    Chloride 95 - 107 mEq/L 102  106  107  103  101  105  105    CO2 20 - 31 mEq/L 26  28  29  24  26  27  28    Anion Gap 9 - 15 mEq/L 10  7 Low   8 Low   15  12  8 Low   9    Glucose 70 - 99 mg/dL 115 High   96  141 High   79  92  127 High   97    BUN 8 - 23 mg/dL 14  10  14  15  15  13  11    Creatinine 0.50 - 0.90 mg/dL 0.48 Low   0.46 Low   0.48 Low   0.42 Low   0.54  0.66  0.57    Est, Glom Filt Rate >60 >60.0  >60.0 CM            Calcium 8.5 - 9.9 mg/dL 8.5  8.6  8.7  9.2  9.3  8.8  8.6    Total Protein 6.3 - 8.0 g/dL 6.2 Low   6.4  6.7  7.0  7.3  7.0     Albumin 3.5 - 4.6 g/dL 2.9 Low   3.1 Low   3.3 Low   3.4 Low   3.6  3.2 Low      Total Bilirubin 0.2 - 0.7 mg/dL 0.5  0.6  0.3  0.4  0.5  0.4     Alkaline Phosphatase 40 - 130 U/L 74  78  56  54  57  57     ALT 0 - 33 U/L 73 High   84 High   22  25  14  13     AST 0 - 35 U/L 62 High   91 High   30  33  24  25     Globulin 2.3 - 3.5 g/dL 3.3  3.3  3.4              0 Result Notes  Component Ref Range & Units 10/26/22 1030 10/19/22 1030 10/11/22 1003 10/4/22 1359 9/27/22 1110   CRP 0.0 - 5.0 mg/L 42.9 High   37.8 High   28.8 High   35.5 High   20.2 High     [facility]  [facility] [address]  [address] [facility] [address] -         CT A/P 10/19/2020  ABDOMEN   The visualized portion of the lung bases demonstrates a small effusion in the left thoracic cavity. The liver is of normal size and attenuation without focal lesions. The spleen is unremarkable. Kidneys demonstrate prompt enhancement and excretion of contrast without signs of hydronephrosis or focal mass. The pancreas and adrenals are unremarkable.     The upper abdominal bowel loops appear normal. Again seen is ectasia of the visualized abdominal aorta and tortuosity, unchanged from prior. 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 There are no signs of    upper abdominal fluid collections. PELVIS        Again seen is a large abscess in the right lower abdomen/pelvis abutting the cecum now measuring 6.6 x 9.4 in transverse and AP dimensions, previously measuring 6 x 6.7 and AP and transverse dimensions. The previously seen abscess drain has now been    pulled out of the abscess and lies in the subcutaneous soft tissues and is not contributing to any drainage of the abscess consistent with abscess enlargement. Again seen is a large cystic mass in the pelvis. On this date 10/28/2022 I have spent 30 minutes reviewing previous notes, test results and face to face with the patient discussing the diagnosis and importance of compliance with the treatment plan as well as documenting on the day of the visit. An electronic signature was used to authenticate this note.     --Marilin Bourgeois MD

## 2022-10-28 NOTE — TELEPHONE ENCOUNTER
Received call from [facility], States patient need cathflo. N Blood return. We Will consult Id Doctor.

## 2022-10-30 LAB — C DIFF TOXIN/ANTIGEN: NORMAL

## 2022-10-31 ENCOUNTER — TELEPHONE (OUTPATIENT)
Dept: INTERVENTIONAL RADIOLOGY/VASCULAR | Age: 87
End: 2022-10-31

## 2022-10-31 ENCOUNTER — TELEPHONE (OUTPATIENT)
Dept: PULMONOLOGY | Age: 87
End: 2022-10-31

## 2022-10-31 NOTE — TELEPHONE ENCOUNTER
Pts son called stating he spoke with dr Samm Copeland and they want drain back in. Pts son also stated he stopped  his mother Carol Bates on 10/29/2022 bacause he was told last time before drain  was put in to stop it 2 days before.        Pls advise        Per hilario we can schedule   Given to wan to schedule

## 2022-11-01 ENCOUNTER — PRE-PROCEDURE TELEPHONE (OUTPATIENT)
Dept: CT IMAGING | Age: 87
End: 2022-11-01

## 2022-11-01 LAB
ANION GAP SERPL CALCULATED.3IONS-SCNC: 8 MEQ/L (ref 9–15)
BASOPHILS ABSOLUTE: 0 K/UL (ref 0–0.2)
BASOPHILS RELATIVE PERCENT: 0.7 %
BUN BLDV-MCNC: 10 MG/DL (ref 8–23)
CALCIUM SERPL-MCNC: 8.1 MG/DL (ref 8.5–9.9)
CHLORIDE BLD-SCNC: 104 MEQ/L (ref 95–107)
CO2: 26 MEQ/L (ref 20–31)
CREAT SERPL-MCNC: 0.47 MG/DL (ref 0.5–0.9)
EOSINOPHILS ABSOLUTE: 0.2 K/UL (ref 0–0.7)
EOSINOPHILS RELATIVE PERCENT: 4.1 %
GFR SERPL CREATININE-BSD FRML MDRD: >60 ML/MIN/{1.73_M2}
GLUCOSE BLD-MCNC: 172 MG/DL (ref 70–99)
HCT VFR BLD CALC: 31.8 % (ref 37–47)
HEMOGLOBIN: 10.3 G/DL (ref 12–16)
LYMPHOCYTES ABSOLUTE: 0.6 K/UL (ref 1–4.8)
LYMPHOCYTES RELATIVE PERCENT: 10.9 %
MCH RBC QN AUTO: 26.8 PG (ref 27–31.3)
MCHC RBC AUTO-ENTMCNC: 32.3 % (ref 33–37)
MCV RBC AUTO: 83 FL (ref 79.4–94.8)
MONOCYTES ABSOLUTE: 0.5 K/UL (ref 0.2–0.8)
MONOCYTES RELATIVE PERCENT: 8.5 %
NEUTROPHILS ABSOLUTE: 4.4 K/UL (ref 1.4–6.5)
NEUTROPHILS RELATIVE PERCENT: 75.8 %
PDW BLD-RTO: 16.5 % (ref 11.5–14.5)
PLATELET # BLD: 183 K/UL (ref 130–400)
POTASSIUM SERPL-SCNC: 3.1 MEQ/L (ref 3.4–4.9)
RBC # BLD: 3.83 M/UL (ref 4.2–5.4)
SODIUM BLD-SCNC: 138 MEQ/L (ref 135–144)
WBC # BLD: 5.7 K/UL (ref 4.8–10.8)

## 2022-11-01 NOTE — TELEPHONE ENCOUNTER
PATIENT'S SON CALLED AND PROCEDURE WAS SCHEDULED FOR 11/2/2022. SHE DID START HOLDING THE [employer] ON 10/30/2022.

## 2022-11-01 NOTE — FLOWSHEET NOTE
Pre-Procedure telephone call re: RLQ abscess drain placement for 11/2/22. Spoke with patent's son, Mira. Instructions reviewed with him:   NPO after MN   Check in at diagnostic / imaging window at 0700  He will accompany patient and drive   Confirms patient's Xarelto has been on hold since this weekend (instructed to hold x 2 days)    All questions answered and understanding indicated.  Electronically signed by Mary Lou Silverman RN on 11/1/2022 at 1:03 PM Initiate Treatment: Protopic BID X 5-7 days. Repeat PRN Detail Level: Zone

## 2022-11-02 ENCOUNTER — HOSPITAL ENCOUNTER (OUTPATIENT)
Dept: CT IMAGING | Age: 87
Discharge: HOME | End: 2022-11-04
Payer: MEDICARE

## 2022-11-02 VITALS
HEART RATE: 58 BPM | WEIGHT: 160 LBS | HEIGHT: 62 IN | RESPIRATION RATE: 10 BRPM | SYSTOLIC BLOOD PRESSURE: 145 MMHG | BODY MASS INDEX: 29.44 KG/M2 | DIASTOLIC BLOOD PRESSURE: 85 MMHG | OXYGEN SATURATION: 91 %

## 2022-11-02 DIAGNOSIS — R10.31 RLQ ABDOMINAL PAIN: ICD-10-CM

## 2022-11-02 DIAGNOSIS — I10 ESSENTIAL HYPERTENSION: ICD-10-CM

## 2022-11-02 DIAGNOSIS — K65.1 ABSCESS OF ABDOMINAL CAVITY (HCC): ICD-10-CM

## 2022-11-02 DIAGNOSIS — R19.00 ABDOMINAL MASS, UNSPECIFIED ABDOMINAL LOCATION: ICD-10-CM

## 2022-11-02 DIAGNOSIS — I50.30 DIASTOLIC CONGESTIVE HEART FAILURE, UNSPECIFIED HF CHRONICITY (HCC): ICD-10-CM

## 2022-11-02 DIAGNOSIS — Z79.01 ON ANTICOAGULANT THERAPY: ICD-10-CM

## 2022-11-02 DIAGNOSIS — I48.91 ATRIAL FIBRILLATION, UNSPECIFIED TYPE (HCC): ICD-10-CM

## 2022-11-02 DIAGNOSIS — M62.81 MUSCLE WEAKNESS (GENERALIZED): ICD-10-CM

## 2022-11-02 DIAGNOSIS — I74.10 AORTIC THROMBUS (HCC): ICD-10-CM

## 2022-11-02 DIAGNOSIS — J81.0 ACUTE PULMONARY EDEMA (HCC): ICD-10-CM

## 2022-11-02 DIAGNOSIS — Z98.890 HX OF DRAINAGE OF ABSCESS: ICD-10-CM

## 2022-11-02 DIAGNOSIS — K65.1 INTRA-ABDOMINAL ABSCESS (HCC): ICD-10-CM

## 2022-11-02 DIAGNOSIS — I71.21 ANEURYSM OF ASCENDING AORTA WITHOUT RUPTURE: ICD-10-CM

## 2022-11-02 PROCEDURE — 6370000000 HC RX 637 (ALT 250 FOR IP): Performed by: RADIOLOGY

## 2022-11-02 PROCEDURE — 6360000002 HC RX W HCPCS: Performed by: RADIOLOGY

## 2022-11-02 PROCEDURE — A4217 STERILE WATER/SALINE, 500 ML: HCPCS | Performed by: RADIOLOGY

## 2022-11-02 PROCEDURE — 2580000003 HC RX 258: Performed by: RADIOLOGY

## 2022-11-02 PROCEDURE — 2500000003 HC RX 250 WO HCPCS: Performed by: RADIOLOGY

## 2022-11-02 PROCEDURE — 2709999900 CT GUIDED NEEDLE PLACEMENT

## 2022-11-02 PROCEDURE — 99152 MOD SED SAME PHYS/QHP 5/>YRS: CPT | Performed by: RADIOLOGY

## 2022-11-02 PROCEDURE — 87075 CULTR BACTERIA EXCEPT BLOOD: CPT

## 2022-11-02 PROCEDURE — 10030 IMG GID FLU COLL DRG SFT TIS: CPT | Performed by: RADIOLOGY

## 2022-11-02 PROCEDURE — 87070 CULTURE OTHR SPECIMN AEROBIC: CPT

## 2022-11-02 RX ORDER — 0.9 % SODIUM CHLORIDE 0.9 %
INTRAVENOUS SOLUTION INTRAVENOUS CONTINUOUS PRN
Status: COMPLETED | OUTPATIENT
Start: 2022-11-02 | End: 2022-11-02

## 2022-11-02 RX ORDER — MAGNESIUM HYDROXIDE 1200 MG/15ML
LIQUID ORAL CONTINUOUS PRN
Status: COMPLETED | OUTPATIENT
Start: 2022-11-02 | End: 2022-11-02

## 2022-11-02 RX ORDER — BACITRACIN, NEOMYCIN, POLYMYXIN B 400; 3.5; 5 [USP'U]/G; MG/G; [USP'U]/G
OINTMENT TOPICAL
Status: COMPLETED | OUTPATIENT
Start: 2022-11-02 | End: 2022-11-02

## 2022-11-02 RX ORDER — FENTANYL CITRATE 50 UG/ML
INJECTION, SOLUTION INTRAMUSCULAR; INTRAVENOUS
Status: COMPLETED | OUTPATIENT
Start: 2022-11-02 | End: 2022-11-02

## 2022-11-02 RX ORDER — MIDAZOLAM HYDROCHLORIDE 2 MG/2ML
INJECTION, SOLUTION INTRAMUSCULAR; INTRAVENOUS
Status: COMPLETED | OUTPATIENT
Start: 2022-11-02 | End: 2022-11-02

## 2022-11-02 RX ORDER — LIDOCAINE HYDROCHLORIDE 20 MG/ML
INJECTION, SOLUTION INFILTRATION; PERINEURAL
Status: COMPLETED | OUTPATIENT
Start: 2022-11-02 | End: 2022-11-02

## 2022-11-02 RX ADMIN — FENTANYL CITRATE 50 MCG: 50 INJECTION, SOLUTION INTRAMUSCULAR; INTRAVENOUS at 08:29

## 2022-11-02 RX ADMIN — BACITRACIN ZINC, NEOMYCIN SULFATE, POLYMYXIN B SULFATE 1 EACH: 3.5; 5000; 4 OINTMENT TOPICAL at 08:38

## 2022-11-02 RX ADMIN — SODIUM CHLORIDE 250 ML: 900 IRRIGANT IRRIGATION at 08:15

## 2022-11-02 RX ADMIN — SODIUM CHLORIDE 250 ML: 9 INJECTION, SOLUTION INTRAVENOUS at 08:20

## 2022-11-02 RX ADMIN — MIDAZOLAM HYDROCHLORIDE 0.5 MG: 1 INJECTION, SOLUTION INTRAMUSCULAR; INTRAVENOUS at 08:29

## 2022-11-02 RX ADMIN — LIDOCAINE HYDROCHLORIDE 7 ML: 20 INJECTION, SOLUTION INFILTRATION; PERINEURAL at 08:32

## 2022-11-02 ASSESSMENT — PAIN SCALES - GENERAL: PAINLEVEL_OUTOF10: 0

## 2022-11-02 NOTE — FLOWSHEET NOTE
0061 - Patient brought back to CT holding via , accompanied by son who is assisting with [place] translation. Patient denies pain or SOB, alert and calm. Moderate assistance provided to transfer patient from [place] [id number] to [facility], undress and place in gown. Patient very SOB with activity, audible expiratory wheezing noted. SPO2 immediately obtained - 96% on 2L NC (home oxygen). Once settled onto cart and repositioned with HOB elevated, patient respirations improved. 5356 - Allergies, home medications and history reviewed with patient's son. Confirmed she has been NPO since prior to MN, except took coreg & synthroid with sips of water. Med list reviewed and correct in [facility], except patient is on IV piperacillin / tazobactam per son report. Consent reviewed and signed by son. Patient with PICC to RUE - assessed by JESUS RN - flushed with GBR. Dr. Ammy Boland paged that patient ready to be seen in CT holding.     [facility] Dr. Ammy Boland at cart side to assess patient and speak with son about plan of care. 5143 - To CT via cart for procedure.  Electronically signed by Lindsay Garay RN on 11/2/2022 at 8:12 AM

## 2022-11-02 NOTE — DISCHARGE INSTRUCTIONS
ASPIRATION DISCHARGE INSTRUCTIONS      ACTIVITY: Rest today. Expect to be sore for 1 to 2 days. No heavy bending, lifting, stretching, pushing or pulling for at least 24 hours. Do not lift anything over 10 lbs for the next 24 hours. Do not drive today. BATHING:  May shower, bathe, or swim after 24 hours. Pat site dry. May remove large band aid after 24 hours. DIET:  May resume your normal diet. MEDICATION:    Resume home medication unless otherwise instructed by your physician. (If applicable) If taking blood thinners or Aspirin, hold for 24 hours after biopsy. May take mild pain relievers, as needed, such as Acetaminophen or Ibuprofen. COMPLICATIONS RELATED TO ASPIRATION:  -Dizziness, weakness, fatigue  -Bruising, firmness,and/or pain at the site  -Extreme pain after leaving the hospital  -Large  or heavy bleeding at biopsy site  IF THESE SYMPTOMS OCCUR AND BECOME SEVERE, REPORT TO THE EMERGENCY ROOM FOR FURTHER EVALUATION. For the next 48 hours watch the site for redness, drainage, swelling, fever (temp above 101 degrees F), or chills. If these signs of infection occur, contact  DR Gabino Pineda at [phone]. If you have questions of concern tonight please call [facility] at [phone] and ask to speak to a RADIOLOGY RN. If you are unable to contact the above physician and feel you have a major problem, please go to the Emergency Room for treatment.

## 2022-11-02 NOTE — PROGRESS NOTES
Pt brought to CT holding via cart. VSS.     0900 VSS.     1916 Pt's son at cart-side. Pt denies any pain. Bandaid clean, dry, and intact. 9863 Discharge instructions reviewed with pts son. Pt states understanding. 46 Pt assisted into street clothes with 2 assist.     0918 Pt taken via wheel chair on 2 L O2 n/c with all belongings to go home with son.

## 2022-11-02 NOTE — OR NURSING
SEDATION    [id number] - Patient to CT via cart and transferred to the table with 3 person assistance. Monitors and oxygen applied, 3L NC with ETCO2. Consent verified. 2794 - Area generously prepped with chlorhexidine per Dr. Blanco Gomez, then draped with full body drape in sterile fashion. 2034 - Time out completed for Percutaneous CT guided abdominal abscess drain placement with conscious sedation. 9045 - Conscious sedation medication administered per ADDIE LEE. See eMAR.     0400 - Lidocaine 2 % given by Dr. Blanco Gomez at the marked site with CT guidance. 5533 - One step 5F centesis needle placed using CT guidance by Dr. Blanco Gomez. 0818 - Dr. Blanco Gomez aspirated small amount of thick red tinged fluid and sample placed into sterile cup. Per Dr. Blanco Gomez, we will not proceed with APDL drain placement at this time. Verbal order obtained to send specimen for culture and gram stain. Message sent to office staff, as well as Dr. Janeth Devine / ID.     8352 - Centesis catheter removed, neosporin then band aid applied. Patient tolerated well. Assisted off table back to cart and to return to CT holding for recovery.  Electronically signed by Yolanda Gross RN on 11/2/2022 at 8:40 AM      FENTANYL TOTAL: 50mcg    VERSED TOTAL: 0.5mg

## 2022-11-07 LAB — CULTURE WOUND: NORMAL

## 2022-11-09 LAB
ANION GAP SERPL CALCULATED.3IONS-SCNC: 9 MEQ/L (ref 9–15)
BASOPHILS ABSOLUTE: 0.1 K/UL (ref 0–0.2)
BASOPHILS RELATIVE PERCENT: 0.9 %
BUN BLDV-MCNC: 12 MG/DL (ref 8–23)
CALCIUM SERPL-MCNC: 8.6 MG/DL (ref 8.5–9.9)
CHLORIDE BLD-SCNC: 100 MEQ/L (ref 95–107)
CO2: 30 MEQ/L (ref 20–31)
CREAT SERPL-MCNC: 0.51 MG/DL (ref 0.5–0.9)
EOSINOPHILS ABSOLUTE: 0.1 K/UL (ref 0–0.7)
EOSINOPHILS RELATIVE PERCENT: 2.3 %
GFR SERPL CREATININE-BSD FRML MDRD: >60 ML/MIN/{1.73_M2}
GLUCOSE BLD-MCNC: 173 MG/DL (ref 70–99)
HCT VFR BLD CALC: 32.9 % (ref 37–47)
HEMOGLOBIN: 10.6 G/DL (ref 12–16)
LYMPHOCYTES ABSOLUTE: 0.6 K/UL (ref 1–4.8)
LYMPHOCYTES RELATIVE PERCENT: 10.9 %
MCH RBC QN AUTO: 26.8 PG (ref 27–31.3)
MCHC RBC AUTO-ENTMCNC: 32.1 % (ref 33–37)
MCV RBC AUTO: 83.5 FL (ref 79.4–94.8)
MONOCYTES ABSOLUTE: 0.5 K/UL (ref 0.2–0.8)
MONOCYTES RELATIVE PERCENT: 8.7 %
NEUTROPHILS ABSOLUTE: 4.3 K/UL (ref 1.4–6.5)
NEUTROPHILS RELATIVE PERCENT: 77.2 %
PDW BLD-RTO: 17 % (ref 11.5–14.5)
PLATELET # BLD: 160 K/UL (ref 130–400)
POTASSIUM SERPL-SCNC: 3.2 MEQ/L (ref 3.4–4.9)
RBC # BLD: 3.93 M/UL (ref 4.2–5.4)
SODIUM BLD-SCNC: 139 MEQ/L (ref 135–144)
WBC # BLD: 5.6 K/UL (ref 4.8–10.8)

## 2022-11-11 ENCOUNTER — APPOINTMENT (OUTPATIENT)
Dept: CT IMAGING | Age: 87
DRG: 291 | End: 2022-11-11
Payer: MEDICARE

## 2022-11-11 ENCOUNTER — APPOINTMENT (OUTPATIENT)
Dept: GENERAL RADIOLOGY | Age: 87
DRG: 291 | End: 2022-11-11
Payer: MEDICARE

## 2022-11-11 ENCOUNTER — HOSPITAL ENCOUNTER (INPATIENT)
Age: 87
LOS: 5 days | Discharge: NURSING, REHAB OR LONG-TERM CARE | DRG: 291 | End: 2022-11-16
Attending: EMERGENCY MEDICINE | Admitting: INTERNAL MEDICINE
Payer: MEDICARE

## 2022-11-11 DIAGNOSIS — I50.20 SYSTOLIC CONGESTIVE HEART FAILURE, UNSPECIFIED HF CHRONICITY (HCC): Primary | ICD-10-CM

## 2022-11-11 DIAGNOSIS — R06.00 DYSPNEA, UNSPECIFIED TYPE: ICD-10-CM

## 2022-11-11 PROBLEM — I50.43 CHF (CONGESTIVE HEART FAILURE), NYHA CLASS I, ACUTE ON CHRONIC, COMBINED (HCC): Status: ACTIVE | Noted: 2022-01-01

## 2022-11-11 PROBLEM — I50.31 ACUTE DIASTOLIC HF (HEART FAILURE) (HCC): Status: ACTIVE | Noted: 2022-11-11

## 2022-11-11 LAB
ALBUMIN SERPL-MCNC: 3 G/DL (ref 3.5–4.6)
ALP BLD-CCNC: 61 U/L (ref 40–130)
ALT SERPL-CCNC: 44 U/L (ref 0–33)
ANION GAP SERPL CALCULATED.3IONS-SCNC: 10 MEQ/L (ref 9–15)
AST SERPL-CCNC: 49 U/L (ref 0–35)
BASE EXCESS ARTERIAL: 3 (ref -3–3)
BASOPHILS ABSOLUTE: 0.1 K/UL (ref 0–0.2)
BASOPHILS RELATIVE PERCENT: 0.8 %
BILIRUB SERPL-MCNC: 0.5 MG/DL (ref 0.2–0.7)
BILIRUBIN URINE: NEGATIVE
BLOOD, URINE: NEGATIVE
BUN BLDV-MCNC: 16 MG/DL (ref 8–23)
CALCIUM IONIZED: 1.25 MMOL/L (ref 1.12–1.32)
CALCIUM SERPL-MCNC: 8.9 MG/DL (ref 8.5–9.9)
CHLORIDE BLD-SCNC: 103 MEQ/L (ref 95–107)
CLARITY: CLEAR
CO2: 26 MEQ/L (ref 20–31)
COLOR: YELLOW
CREAT SERPL-MCNC: 0.51 MG/DL (ref 0.5–0.9)
D DIMER: 1.55 MG/L FEU (ref 0–0.5)
EKG ATRIAL RATE: 277 BPM
EKG P AXIS: 203 DEGREES
EKG Q-T INTERVAL: 424 MS
EKG QRS DURATION: 138 MS
EKG QTC CALCULATION (BAZETT): 489 MS
EKG R AXIS: -75 DEGREES
EKG T AXIS: 202 DEGREES
EKG VENTRICULAR RATE: 80 BPM
EOSINOPHILS ABSOLUTE: 0 K/UL (ref 0–0.7)
EOSINOPHILS RELATIVE PERCENT: 0.7 %
GFR SERPL CREATININE-BSD FRML MDRD: >60 ML/MIN/{1.73_M2}
GFR SERPL CREATININE-BSD FRML MDRD: >60 ML/MIN/{1.73_M2}
GLOBULIN: 3.4 G/DL (ref 2.3–3.5)
GLUCOSE BLD-MCNC: 129 MG/DL (ref 70–99)
GLUCOSE BLD-MCNC: 142 MG/DL (ref 70–99)
GLUCOSE URINE: NEGATIVE MG/DL
HCO3 ARTERIAL: 27.8 MMOL/L (ref 21–29)
HCT VFR BLD CALC: 35.3 % (ref 37–47)
HEMOGLOBIN: 11.1 G/DL (ref 12–16)
HEMOGLOBIN: 12.3 GM/DL (ref 12–16)
INR BLD: 1.6
KETONES, URINE: NEGATIVE MG/DL
LACTATE: 1.19 MMOL/L (ref 0.4–2)
LEUKOCYTE ESTERASE, URINE: NEGATIVE
LYMPHOCYTES ABSOLUTE: 0.7 K/UL (ref 1–4.8)
LYMPHOCYTES RELATIVE PERCENT: 10.8 %
MCH RBC QN AUTO: 26.2 PG (ref 27–31.3)
MCHC RBC AUTO-ENTMCNC: 31.5 % (ref 33–37)
MCV RBC AUTO: 83.3 FL (ref 79.4–94.8)
MONOCYTES ABSOLUTE: 0.6 K/UL (ref 0.2–0.8)
MONOCYTES RELATIVE PERCENT: 8.9 %
NEUTROPHILS ABSOLUTE: 5.3 K/UL (ref 1.4–6.5)
NEUTROPHILS RELATIVE PERCENT: 78.8 %
NITRITE, URINE: NEGATIVE
O2 SAT, ARTERIAL: 96 % (ref 93–100)
PCO2 ARTERIAL: 43 MM HG (ref 35–45)
PDW BLD-RTO: 17.1 % (ref 11.5–14.5)
PERFORMED ON: ABNORMAL
PH ARTERIAL: 7.42 (ref 7.35–7.45)
PH UA: 6.5 (ref 5–9)
PLATELET # BLD: 185 K/UL (ref 130–400)
PO2 ARTERIAL: 84 MM HG (ref 75–108)
POC CHLORIDE: 101 MEQ/L (ref 99–110)
POC CREATININE: 0.7 MG/DL (ref 0.6–1.2)
POC FIO2: 2
POC HEMATOCRIT: 36 % (ref 36–48)
POC POTASSIUM: 3.2 MEQ/L (ref 3.5–5.1)
POC SAMPLE TYPE: ABNORMAL
POC SODIUM: 142 MEQ/L (ref 136–145)
POTASSIUM SERPL-SCNC: 3.2 MEQ/L (ref 3.4–4.9)
PRO-BNP: NORMAL PG/ML
PROTEIN UA: NEGATIVE MG/DL
PROTHROMBIN TIME: 18.9 SEC (ref 12.3–14.9)
RBC # BLD: 4.24 M/UL (ref 4.2–5.4)
SODIUM BLD-SCNC: 139 MEQ/L (ref 135–144)
SPECIFIC GRAVITY UA: 1.01 (ref 1–1.03)
TCO2 ARTERIAL: 29 MMOL/L (ref 21–32)
TOTAL PROTEIN: 6.4 G/DL (ref 6.3–8)
TROPONIN: <0.01 NG/ML (ref 0–0.01)
UROBILINOGEN, URINE: 0.2 E.U./DL
WBC # BLD: 6.8 K/UL (ref 4.8–10.8)

## 2022-11-11 PROCEDURE — 96374 THER/PROPH/DIAG INJ IV PUSH: CPT

## 2022-11-11 PROCEDURE — 6370000000 HC RX 637 (ALT 250 FOR IP): Performed by: INTERNAL MEDICINE

## 2022-11-11 PROCEDURE — 81003 URINALYSIS AUTO W/O SCOPE: CPT

## 2022-11-11 PROCEDURE — 99223 1ST HOSP IP/OBS HIGH 75: CPT | Performed by: INTERNAL MEDICINE

## 2022-11-11 PROCEDURE — 2060000000 HC ICU INTERMEDIATE R&B

## 2022-11-11 PROCEDURE — 71275 CT ANGIOGRAPHY CHEST: CPT

## 2022-11-11 PROCEDURE — 82565 ASSAY OF CREATININE: CPT

## 2022-11-11 PROCEDURE — 85379 FIBRIN DEGRADATION QUANT: CPT

## 2022-11-11 PROCEDURE — 85025 COMPLETE CBC W/AUTO DIFF WBC: CPT

## 2022-11-11 PROCEDURE — 85610 PROTHROMBIN TIME: CPT

## 2022-11-11 PROCEDURE — 82330 ASSAY OF CALCIUM: CPT

## 2022-11-11 PROCEDURE — 84132 ASSAY OF SERUM POTASSIUM: CPT

## 2022-11-11 PROCEDURE — 84295 ASSAY OF SERUM SODIUM: CPT

## 2022-11-11 PROCEDURE — 85014 HEMATOCRIT: CPT

## 2022-11-11 PROCEDURE — 84484 ASSAY OF TROPONIN QUANT: CPT

## 2022-11-11 PROCEDURE — 93010 ELECTROCARDIOGRAM REPORT: CPT | Performed by: INTERNAL MEDICINE

## 2022-11-11 PROCEDURE — 6360000004 HC RX CONTRAST MEDICATION: Performed by: EMERGENCY MEDICINE

## 2022-11-11 PROCEDURE — 83605 ASSAY OF LACTIC ACID: CPT

## 2022-11-11 PROCEDURE — 82435 ASSAY OF BLOOD CHLORIDE: CPT

## 2022-11-11 PROCEDURE — 80053 COMPREHEN METABOLIC PANEL: CPT

## 2022-11-11 PROCEDURE — 36600 WITHDRAWAL OF ARTERIAL BLOOD: CPT

## 2022-11-11 PROCEDURE — 36415 COLL VENOUS BLD VENIPUNCTURE: CPT

## 2022-11-11 PROCEDURE — 83880 ASSAY OF NATRIURETIC PEPTIDE: CPT

## 2022-11-11 PROCEDURE — 6360000002 HC RX W HCPCS: Performed by: EMERGENCY MEDICINE

## 2022-11-11 PROCEDURE — 2580000003 HC RX 258: Performed by: INTERNAL MEDICINE

## 2022-11-11 PROCEDURE — 93005 ELECTROCARDIOGRAM TRACING: CPT | Performed by: EMERGENCY MEDICINE

## 2022-11-11 PROCEDURE — 99285 EMERGENCY DEPT VISIT HI MDM: CPT

## 2022-11-11 PROCEDURE — 71045 X-RAY EXAM CHEST 1 VIEW: CPT

## 2022-11-11 PROCEDURE — 6360000002 HC RX W HCPCS: Performed by: INTERNAL MEDICINE

## 2022-11-11 PROCEDURE — 82803 BLOOD GASES ANY COMBINATION: CPT

## 2022-11-11 RX ORDER — ONDANSETRON 2 MG/ML
4 INJECTION INTRAMUSCULAR; INTRAVENOUS EVERY 6 HOURS PRN
Status: DISCONTINUED | OUTPATIENT
Start: 2022-11-11 | End: 2022-11-16 | Stop reason: HOSPADM

## 2022-11-11 RX ORDER — L. ACIDOPHILUS/L.BULGARICUS 1MM CELL
4 TABLET ORAL 3 TIMES DAILY
Status: DISCONTINUED | OUTPATIENT
Start: 2022-11-11 | End: 2022-11-16 | Stop reason: HOSPADM

## 2022-11-11 RX ORDER — CARVEDILOL 6.25 MG/1
6.25 TABLET ORAL 2 TIMES DAILY
Status: DISCONTINUED | OUTPATIENT
Start: 2022-11-11 | End: 2022-11-16 | Stop reason: HOSPADM

## 2022-11-11 RX ORDER — PANTOPRAZOLE SODIUM 40 MG/1
40 TABLET, DELAYED RELEASE ORAL DAILY
Status: DISCONTINUED | OUTPATIENT
Start: 2022-11-11 | End: 2022-11-16 | Stop reason: HOSPADM

## 2022-11-11 RX ORDER — ENOXAPARIN SODIUM 100 MG/ML
40 INJECTION SUBCUTANEOUS DAILY
Status: DISCONTINUED | OUTPATIENT
Start: 2022-11-11 | End: 2022-11-11

## 2022-11-11 RX ORDER — UREA 10 %
LOTION (ML) TOPICAL DAILY
COMMUNITY

## 2022-11-11 RX ORDER — SODIUM CHLORIDE 0.9 % (FLUSH) 0.9 %
5-40 SYRINGE (ML) INJECTION EVERY 12 HOURS SCHEDULED
Status: DISCONTINUED | OUTPATIENT
Start: 2022-11-11 | End: 2022-11-16 | Stop reason: HOSPADM

## 2022-11-11 RX ORDER — POTASSIUM CHLORIDE 20 MEQ/1
20 TABLET, EXTENDED RELEASE ORAL ONCE
Status: COMPLETED | OUTPATIENT
Start: 2022-11-11 | End: 2022-11-11

## 2022-11-11 RX ORDER — LEVOTHYROXINE SODIUM 88 UG/1
88 TABLET ORAL DAILY
Status: DISCONTINUED | OUTPATIENT
Start: 2022-11-11 | End: 2022-11-16 | Stop reason: HOSPADM

## 2022-11-11 RX ORDER — ACETAMINOPHEN 650 MG/1
650 SUPPOSITORY RECTAL EVERY 6 HOURS PRN
Status: DISCONTINUED | OUTPATIENT
Start: 2022-11-11 | End: 2022-11-16 | Stop reason: HOSPADM

## 2022-11-11 RX ORDER — ENOXAPARIN SODIUM 100 MG/ML
40 INJECTION SUBCUTANEOUS 2 TIMES DAILY
Status: DISCONTINUED | OUTPATIENT
Start: 2022-11-11 | End: 2022-11-11

## 2022-11-11 RX ORDER — POTASSIUM CHLORIDE 20 MEQ/1
20 TABLET, EXTENDED RELEASE ORAL DAILY
Status: DISCONTINUED | OUTPATIENT
Start: 2022-11-11 | End: 2022-11-12

## 2022-11-11 RX ORDER — ONDANSETRON 4 MG/1
4 TABLET, ORALLY DISINTEGRATING ORAL EVERY 8 HOURS PRN
Status: DISCONTINUED | OUTPATIENT
Start: 2022-11-11 | End: 2022-11-16 | Stop reason: HOSPADM

## 2022-11-11 RX ORDER — FUROSEMIDE 10 MG/ML
40 INJECTION INTRAMUSCULAR; INTRAVENOUS ONCE
Status: COMPLETED | OUTPATIENT
Start: 2022-11-11 | End: 2022-11-11

## 2022-11-11 RX ORDER — FERROUS SULFATE 325(65) MG
325 TABLET ORAL 2 TIMES DAILY WITH MEALS
Status: DISCONTINUED | OUTPATIENT
Start: 2022-11-11 | End: 2022-11-16 | Stop reason: HOSPADM

## 2022-11-11 RX ORDER — FUROSEMIDE 10 MG/ML
20 INJECTION INTRAMUSCULAR; INTRAVENOUS 2 TIMES DAILY
Status: DISCONTINUED | OUTPATIENT
Start: 2022-11-11 | End: 2022-11-13

## 2022-11-11 RX ORDER — ACETAMINOPHEN 325 MG/1
650 TABLET ORAL EVERY 6 HOURS PRN
Status: DISCONTINUED | OUTPATIENT
Start: 2022-11-11 | End: 2022-11-16 | Stop reason: HOSPADM

## 2022-11-11 RX ORDER — SODIUM CHLORIDE 9 MG/ML
INJECTION, SOLUTION INTRAVENOUS PRN
Status: DISCONTINUED | OUTPATIENT
Start: 2022-11-11 | End: 2022-11-16 | Stop reason: HOSPADM

## 2022-11-11 RX ORDER — VITAMIN B COMPLEX
2000 TABLET ORAL
Status: DISCONTINUED | OUTPATIENT
Start: 2022-11-11 | End: 2022-11-16 | Stop reason: HOSPADM

## 2022-11-11 RX ORDER — CITALOPRAM 20 MG/1
20 TABLET ORAL DAILY
Status: DISCONTINUED | OUTPATIENT
Start: 2022-11-11 | End: 2022-11-16 | Stop reason: HOSPADM

## 2022-11-11 RX ORDER — DIGOXIN 125 MCG
125 TABLET ORAL DAILY
Status: DISCONTINUED | OUTPATIENT
Start: 2022-11-11 | End: 2022-11-16 | Stop reason: HOSPADM

## 2022-11-11 RX ORDER — POLYETHYLENE GLYCOL 3350 17 G/17G
17 POWDER, FOR SOLUTION ORAL DAILY PRN
Status: DISCONTINUED | OUTPATIENT
Start: 2022-11-11 | End: 2022-11-16 | Stop reason: HOSPADM

## 2022-11-11 RX ORDER — SODIUM CHLORIDE 0.9 % (FLUSH) 0.9 %
5-40 SYRINGE (ML) INJECTION PRN
Status: DISCONTINUED | OUTPATIENT
Start: 2022-11-11 | End: 2022-11-16 | Stop reason: HOSPADM

## 2022-11-11 RX ADMIN — FUROSEMIDE 40 MG: 10 INJECTION, SOLUTION INTRAMUSCULAR; INTRAVENOUS at 13:03

## 2022-11-11 RX ADMIN — Medication 10 ML: at 21:48

## 2022-11-11 RX ADMIN — POTASSIUM CHLORIDE 20 MEQ: 1500 TABLET, EXTENDED RELEASE ORAL at 21:35

## 2022-11-11 RX ADMIN — FUROSEMIDE 20 MG: 10 INJECTION, SOLUTION INTRAMUSCULAR; INTRAVENOUS at 18:38

## 2022-11-11 RX ADMIN — CARVEDILOL 6.25 MG: 6.25 TABLET, FILM COATED ORAL at 21:34

## 2022-11-11 RX ADMIN — IOPAMIDOL 75 ML: 612 INJECTION, SOLUTION INTRAVENOUS at 16:01

## 2022-11-11 RX ADMIN — POTASSIUM CHLORIDE 20 MEQ: 1500 TABLET, EXTENDED RELEASE ORAL at 18:38

## 2022-11-11 RX ADMIN — LACTOBACILLUS TAB 4 TABLET: TAB at 21:34

## 2022-11-11 RX ADMIN — FERROUS SULFATE TAB 325 MG (65 MG ELEMENTAL FE) 325 MG: 325 (65 FE) TAB at 18:38

## 2022-11-11 RX ADMIN — DIGOXIN 125 MCG: 125 TABLET ORAL at 18:38

## 2022-11-11 RX ADMIN — Medication 2000 UNITS: at 18:38

## 2022-11-11 ASSESSMENT — ENCOUNTER SYMPTOMS
SHORTNESS OF BREATH: 1
BLOOD IN STOOL: 0
SORE THROAT: 0
VOMITING: 0
CHEST TIGHTNESS: 0
EYES NEGATIVE: 1
STRIDOR: 0
ABDOMINAL PAIN: 0
WHEEZING: 0
COUGH: 0
ABDOMINAL PAIN: 1
NAUSEA: 0
EYE PAIN: 0

## 2022-11-11 ASSESSMENT — PAIN DESCRIPTION - ORIENTATION: ORIENTATION: MID;UPPER

## 2022-11-11 ASSESSMENT — PAIN DESCRIPTION - LOCATION: LOCATION: ABDOMEN

## 2022-11-11 ASSESSMENT — PAIN DESCRIPTION - ONSET: ONSET: ON-GOING

## 2022-11-11 ASSESSMENT — LIFESTYLE VARIABLES: HOW OFTEN DO YOU HAVE A DRINK CONTAINING ALCOHOL: NEVER

## 2022-11-11 ASSESSMENT — PAIN SCALES - GENERAL
PAINLEVEL_OUTOF10: 8
PAINLEVEL_OUTOF10: 0

## 2022-11-11 ASSESSMENT — PAIN DESCRIPTION - FREQUENCY: FREQUENCY: CONTINUOUS

## 2022-11-11 ASSESSMENT — PAIN - FUNCTIONAL ASSESSMENT: PAIN_FUNCTIONAL_ASSESSMENT: 0-10

## 2022-11-11 NOTE — CONSULTS
Inpatient consult to Cardiology  Consult performed by: Farrah Lepe MD  Consult ordered by: Cynthia Izquierdo MD        Patient Name: Lorri Singh Date: 2022 12:04 PM  MR #: [id number]  : 1932    Attending Physician: Cynthia Izquierdo MD  Reason for consult: CHF    History of Presenting Illness:      Sincere Hall is a 80 y.o. female on hospital day 0 with a history of . History Obtained From:  patient, electronic medical record    To ER with SOB and LE Edema for several days. She has ongoing abd discomfort from abd abscess. She has been getting iv ABX. States she is having hard time taking a deep breath. CTA Chest for PE ordered and -P  She has been on 2-3L home O2. She is on 4L currently. She denies CP. She has AF and has been on Xarelto 15 mg. 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 We recommended Warfarin but since has been changed to Xarelto. LAst Admit, I discussed w pt and daughter regarding options for the aneurysm. Pt  declined invasvie interventions and no surgery. Daughter was inn agreement.    History:      EKG: AF 80  Past Medical History:   Diagnosis Date    Ascending aortic aneurysm 2017    Charcot Arleen Tooth muscular atrophy     CHF (congestive heart failure) (HCC)     Chronic diastolic CHF (congestive heart failure) ([facility] [address] 2022    Depression     Descending aortic aneurysm ([facility] [address] 2017    Essential hypertension 2018    Headache     HTN (hypertension)     Impaired mobility and activities of daily living     Lumbar stenosis with neurogenic claudication     Myelopathy [facility])     Osteoarthritis      Past Surgical History:   Procedure Laterality Date    IR INS PICC VAD W SQ PORT GREATER THAN 5  2022    IR INS PICC VAD W SQ PORT GREATER THAN 5 2022 [facility] SPECIAL PROCEDURE    JOINT REPLACEMENT Bilateral     knees    OTHER SURGICAL HISTORY Right 2022    cat scan guided abdominal mass aspiration with drain placement by Dr. Emi Mensah Left 02/25/2021    LEFT PLEURAL CATHETER INSERTION performed by Ming Akers MD at [place] [id number] Left 01/29/2021    total of 755 cc removed per Dr Ozzy Smith specimen sent to lab       Family History  Family History   Problem Relation Age of Onset    Arthritis Mother     Arthritis Father     High Blood Pressure Father      [] Unable to obtain due to ventilated and/ or neurologic status    Social History     Socioeconomic History    Marital status:       Spouse name: Not on file    Number of children: 2    Years of education: Not on file    Highest education level: Not on file   Occupational History    Not on file   Tobacco Use    Smoking status: Never    Smokeless tobacco: Never   Vaping Use    Vaping Use: Never used   Substance and Sexual Activity    Alcohol use: No     Alcohol/week: 0.0 standard drinks    Drug use: No    Sexual activity: Not Currently   Other Topics Concern    Not on file   Social History Narrative    , Lives With: Alone, son lives down the street, dtr is in the area    Type of Home: [address]: One level    Home Access: Stairs to enter with rails- Number of Steps: 2- Rails: Both    Bathroom Shower/Tub: Tub/Shower unit, Bathroom Equipment: Grab bars in shower, Shower chair    Home Equipment: Rolling walker, Cane(Pt infrequemtly uses DME for ambulation and prefers to furniture walk in home)    ADL Assistance: Independent, [address]: Independent    Homemaking Responsibilities: Yes    Ambulation Assistance: Independent, Transfer Assistance: Independent    Additional Comments: Son stops over 2 times daily         Social Determinants of Health     Financial Resource Strain: Not on file   Food Insecurity: Not on file   Transportation Needs: Not on file   Physical Activity: Not on file   Stress: Not on file   Social Connections: Not on file   Intimate Partner Violence: Not on file   Housing Stability: Not on file      [] Unable to obtain due to ventilated and/ or neurologic status      Home Medications:      Medications Prior to Admission: rivaroxaban (XARELTO) 15 MG TABS tablet, Take 1 tablet by mouth daily  meclizine (ANTIVERT) 25 MG tablet,   acetaminophen (TYLENOL) 325 MG tablet, Take 650 mg by mouth every 6 hours as needed for Pain  Vitamin D (CHOLECALCIFEROL) 50 MCG (2000 UT) TABS tablet, Take 1 tablet by mouth Daily with supper (Patient taking differently: Take 2,000 Units by mouth Daily with supper Indications: Nutritional Support)  potassium chloride (KLOR-CON M) 20 MEQ extended release tablet, Take 20 mEq by mouth daily (with breakfast) Indications: Nutritional Support  furosemide (LASIX) 20 MG tablet, TAKE 1 TABLET DAILY (Patient taking differently: Take 20 mg by mouth daily Indications: Congestive Heart Failure)  pantoprazole (PROTONIX) 40 MG tablet, TAKE 1 TABLET DAILY (Patient taking differently: Take 40 mg by mouth daily Indications: Ulcer)  carvedilol (COREG) 6.25 MG tablet, TAKE 1 TABLET TWICE A DAY (Patient taking differently: Take 6.25 mg by mouth 2 times daily Indications: High Blood Pressure Disorder)  nitroGLYCERIN (NITROSTAT) 0.4 MG SL tablet, up to max of 3 total doses. If no relief after 1 dose, call 911. (Patient taking differently: Place 0.4 mg under the tongue every 5 minutes as needed Indications: Chest Pain up to max of 3 total doses. If no relief after 1 dose, call 911.)  budesonide-formoterol (SYMBICORT) 160-4.5 MCG/ACT AERO, Inhale 2 puffs into the lungs 2 times daily (Patient taking differently: Inhale 2 puffs into the lungs in the morning and 2 puffs in the evening.  Indications: Difficulty Breathing.)  ferrous sulfate (IRON 325) 325 (65 Fe) MG tablet, Take 1 tablet by mouth 2 times daily (with meals) (Patient taking differently: Take 325 mg by mouth 2 times daily (with meals) Indications: Anemia)  Carboxymethylcellulose Sodium (EYE DROPS OP), Apply 1 drop to eye as needed (Dry eyes) Indications: Systane   Boswellia-Glucosamine-Vit D (OSTEO BI-FLEX ONE PER DAY) TABS, Take 1 tablet by mouth daily Indications: Nutritional Support  Multiple Vitamins-Minerals (CENTRUM SILVER ULTRA WOMENS) TABS, Take 1 tablet by mouth daily Indications: Nutritional Support  vitamin B-12 (CYANOCOBALAMIN) 1000 MCG tablet, Take 1,000 mcg by mouth daily Indications: Nutritional Support  citalopram (CELEXA) 20 MG tablet, Take 20 mg by mouth daily Indications: Depression  SYNTHROID 88 MCG tablet, Take 88 mcg by mouth daily Indications: Underactive Thyroid    Current Hospital Medications:     Scheduled Meds:   furosemide  20 mg IntraVENous BID    potassium chloride  20 mEq Oral Daily    sodium chloride flush  5-40 mL IntraVENous 2 times per day    enoxaparin  40 mg SubCUTAneous Daily     Continuous Infusions:   sodium chloride       PRN Meds:.sodium chloride flush, sodium chloride, ondansetron **OR** ondansetron, polyethylene glycol, acetaminophen **OR** acetaminophen  .   sodium chloride          Allergies: Allergies   Allergen Reactions    Latex     Tape Hedy Marsh Tape]         Review of Systems:       Review of Systems   Constitutional: Negative. Negative for diaphoresis and fatigue. HENT: Negative. Eyes: Negative. Respiratory:  Positive for shortness of breath. Negative for cough, chest tightness, wheezing and stridor. Cardiovascular: Negative. Negative for chest pain, palpitations and leg swelling. Gastrointestinal:  Positive for abdominal pain. Negative for blood in stool and nausea. Genitourinary: Negative. Musculoskeletal: Negative. Skin: Negative. Neurological:  Positive for weakness. Negative for dizziness, syncope and light-headedness. Hematological: Negative. Psychiatric/Behavioral: Negative.          Objective Findings:     Vitals:/88   Pulse 93   Temp 98.4 °F (36.9 °C) (Oral)   Resp 23   Ht 5' (1.524 m)   Wt 160 lb (72.6 kg)   SpO2 97%   BMI 31.25 kg/m²      Physical Examination:    Physical Exam   Constitutional: No distress. She appears chronically ill and acutely ill. HENT:   Normal cephalic and Atraumatic   Eyes: Pupils are equal, round, and reactive to light. Neck: Thyroid normal. No JVD present. No neck adenopathy. No thyromegaly present. Cardiovascular: Normal rate, intact distal pulses and normal pulses. An irregularly irregular rhythm present. Murmur heard. Pulmonary/Chest: Effort normal. She has no wheezes. She has bibasilar rales. She exhibits no tenderness. Abdominal: Soft. Bowel sounds are normal. There is no abdominal tenderness. Musculoskeletal:         General: Edema (1+) present. No tenderness. Normal range of motion. Cervical back: Normal range of motion and neck supple. Neurological: She is alert and oriented to person, place, and time. Skin: Skin is warm. No cyanosis. Nails show no clubbing. Results/ Medications reviewed 11/11/2022, 4:32 PM     Laboratory, Microbiology, Pathology, Radiology, Cardiology, Medications and Transcriptions reviewed  Scheduled Meds:   furosemide  20 mg IntraVENous BID    potassium chloride  20 mEq Oral Daily    sodium chloride flush  5-40 mL IntraVENous 2 times per day    enoxaparin  40 mg SubCUTAneous Daily     Continuous Infusions:   sodium chloride         Recent Labs     11/09/22  1135 11/11/22  1230 11/11/22  1232   WBC 5.6 6.8  --    HGB 10.6* 11.1* 12.3   HCT 32.9* 35.3*  --    MCV 83.5 83.3  --     185  --      Recent Labs     11/09/22  1135 11/11/22  1230 11/11/22  1232    139  --    K 3.2* 3.2*  --     103  --    CO2 30 26  --    BUN 12 16  --    CREATININE 0.51 0.51 0.7     Recent Labs     11/11/22  1230   AST 49*   ALT 44*   BILITOT 0.5   ALKPHOS 61     No results for input(s): LIPASE, AMYLASE in the last 72 hours.   Recent Labs     11/11/22  1230   PROT 6.4   INR 1.6     CT GUIDED NEEDLE PLACEMENT    Result Date: 11/3/2022  1. Successful aspiration of left lower quadrant abdominal abscess. 2.Only a few drops of very thick gelatinous serosanguineous fluid with extensive solid debris was able to be aspirated. Due to this a drain was unable to be placed. Patient will be seen general surgery for evaluation and possible surgical intervention. HISTORY: Layne Henning is a Female of 80 years age. DIAGNOSIS:  K65.1 Intra-abdominal abscess (HCC) ICD10 COMPARISON: None available. CT Dose-Length Product (estimate related to radiation exposure from this exam):  710.1  mGy*cm. PROCEDURE: Following the discussion of the procedure, alternatives, risks versus benefits, informed consent was obtained from the patient. Specifically, risks of after-biopsy pain at the site, rare possibility of excessive hemorrhage, infection, injury to the adjacent organs were discussed and the patient verbalized understanding. Pre-procedure evaluation confirmed that the patient was an appropriate candidate for conscious sedation. Adequate sedation was maintained during the entire procedure. Vital signs, pulse oximetry, and response to verbal commands were monitored and recorded by the nurse throughout the procedure and the recovery period. Medical information was entered in the medical record including the medications and dosages used. The patient returned to baseline neurologic and physiologic status prior to leaving the department. No immediate sedation related complications were noted. Medication for conscious sedation was administered via IV route. 45 minutes of conscious sedation was provided. Following universal protocol, patient and site verification was performed with a \"timeout\" prior to the procedure. The patient was placed on the CT table in supine  position and the right lateral abdomen area was prepped and draped in usual sterile fashion.   Using the usual sterile conditions, lidocaine and CT guidance, the complex septated abscess in the right lower  quadrant of the abdomen was accessed using a 4 [place] aspiration catheter. After confirmation of appropriate localization of the needle, aspiration was attempted, though the abscess was mostly solid with very thick gelatinous material with extensive solid debris and only a few drops were aspirated. The drainage catheter was removed and hemostasis was achieved by direct digital compression. The patient tolerated the procedure well. No immediate complications identified. The patient left the CT suite in supine position to the recovery room in stable condition. Total local anesthetic used: lidocaine, approximately 8 mL. Estimated blood loss:  Negligible. The patient was observed in the recovery room for two hours after the procedure and discharged in stable condition. CT ABDOMEN PELVIS W IV CONTRAST Additional Contrast? Oral    Result Date: 10/19/2022  1. The right-sided abscess drain has been completely pulled out of the abscess and now lies in the right abdominal subcutaneous soft tissues. 2.The right lower abdominal abscess has increased in size when compared to prior study dating September 14 likely due to accumulation of infectious fluid due to displacement of the drainage catheter. 3.Again seen is a large cystic mass in the lower pelvis. COMPARISON: September 14, 2022 DIAGNOSIS: Right lower abdomen abscess COMMENTS: None TECHNIQUE: Spiral scanning of the chest, abdomen and pelvis was performed after  administration of intravenous contrast. CT Dose-Length Product (estimate related to radiation exposure from this exam):  314.66  mGy*cm. FINDINGS: ABDOMEN The visualized portion of the lung bases demonstrates a small effusion in the left thoracic cavity. The liver is of normal size and attenuation without focal lesions. The spleen is unremarkable. Kidneys demonstrate prompt enhancement and excretion of contrast without signs of hydronephrosis or focal mass.  The pancreas and adrenals are unremarkable. The upper abdominal bowel loops appear normal. Again seen is ectasia of the visualized abdominal aorta and tortuosity, unchanged from prior. 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 There are no signs of upper abdominal fluid collections. PELVIS  Again seen is a large abscess in the right lower abdomen/pelvis abutting the cecum now measuring 6.6 x 9.4 in transverse and AP dimensions, previously measuring 6 x 6.7 and AP and transverse dimensions. The previously seen abscess drain has now been pulled out of the abscess and lies in the subcutaneous soft tissues and is not contributing to any drainage of the abscess consistent with abscess enlargement. Again seen is a large cystic mass in the pelvis. XR CHEST PORTABLE    Result Date: 11/11/2022  EXAMINATION: ONE XRAY VIEW OF THE CHEST 11/11/2022 12:42 pm COMPARISON: None. HISTORY: ORDERING SYSTEM PROVIDED HISTORY: sob TECHNOLOGIST PROVIDED HISTORY: Reason for exam:->sob What reading provider will be dictating this exam?->CRC FINDINGS: The heart is enlarged. Bilateral aspect airspace disease is noted which could represent CHF or less likely pneumonia. There is no consolidation seen within the right lung base. There is left lung base consolidation which is favored to represent either atelectasis or a pleural effusion. Note is made of a right-sided PICC line. The catheter tip is at the level of the clavicle head. Cardiomegaly with multifocal bilateral airspace disease favored to represent CHF Left lung base consolidation which could represent atelectasis, pleural effusion or less likely pneumonia. .       Active Hospital Problems    Diagnosis Date Noted    Acute diastolic HF (heart failure) ([address] [I50.31] 11/11/2022     Priority: Medium    CHF (congestive heart failure), NYHA class I, acute on chronic, combined ([address] [I50.43] 11/11/2022     Priority: Medium         Impression/Plan:   Acute on chronic DHF- iv LAsix. Replace K> follow Labs. Keep on Telemetry  AF- advance Lovenox to bid. Rate control add DIg  Ascending AO Aneurysm 5.3 cm - no plans for surgery per pt and daughter. She has Mural Thrombus. - advance Lovenox to bid  HTN stable  Abd Abscess and discomfort - on iv ABX. CAD Moderate LAD - no CP reported. Elevated D Dimer- CT Chest ordered and P     Thank you for allowing us to participate in the care of this patient. Will continue to follow. Please call if questions or concerns arise.     Electronically signed by Rubia Garay MD on 11/11/2022 at 4:32 PM

## 2022-11-11 NOTE — ED PROVIDER NOTES
[address] ENCOUNTER      Pt Name: Madison Rivera  MRN: [id number]  [place] 7/12/1932  Date of evaluation: 11/11/2022  Provider: Laureano Cobb DO    CHIEF COMPLAINT       Chief Complaint   Patient presents with    Shortness of Breath     X2 days         HISTORY OF PRESENT ILLNESS   (Location/Symptom, Timing/Onset, Context/Setting, Quality, Duration, Modifying Factors, Severity)  Note limiting factors. Madison Rivera is a 80 y.o. female who presents to the emergency department . Patient comes in with increasing shortness of breath and leg edema for 2 days. No chest pain. History of atrial fibrillation and is on Xarelto. Currently has intra-abdominal abscess and is getting IV Invanz. Met with general surgery may send her back to interventional radiology for drainage procedure. Patient refuses any type of surgery due to her age and comorbidities. HPI    Nursing Notes were reviewed. REVIEW OF SYSTEMS    (2-9 systems for level 4, 10 or more for level 5)     Review of Systems   Constitutional:  Positive for fatigue. Negative for activity change, appetite change and fever. HENT:  Negative for congestion and sore throat. Eyes:  Negative for pain and visual disturbance. Respiratory:  Positive for shortness of breath. Negative for chest tightness. Cardiovascular:  Positive for leg swelling. Negative for chest pain. Gastrointestinal:  Negative for abdominal pain, nausea and vomiting. Endocrine: Negative for polydipsia. Genitourinary:  Negative for flank pain and urgency. Musculoskeletal:  Negative for gait problem and neck stiffness. Skin:  Negative for rash. Neurological:  Negative for weakness, light-headedness and headaches. Psychiatric/Behavioral:  Negative for confusion and sleep disturbance. Except as noted above the remainder of the review of systems was reviewed and negative.        PAST MEDICAL HISTORY     Past Medical History: Diagnosis Date    Ascending aortic aneurysm 6/23/2017    Charcot Arleen Tooth muscular atrophy     CHF (congestive heart failure) (HCC)     Chronic diastolic CHF (congestive heart failure) ([facility] 4/1/2022    Depression     Descending [facility] ([facility] 6/23/2017    Essential hypertension 2/16/2018    Headache     HTN (hypertension)     Impaired mobility and activities of daily living     Lumbar stenosis with neurogenic claudication     Myelopathy (HCC)     Osteoarthritis          SURGICAL HISTORY       Past Surgical History:   Procedure Laterality Date    IR INS PICC VAD W SQ PORT GREATER THAN 5  9/23/2022    IR INS PICC VAD W SQ PORT GREATER THAN 5 9/23/2022 [facility] SPECIAL PROCEDURE    JOINT REPLACEMENT Bilateral     knees    OTHER SURGICAL HISTORY Right 07/21/2022    cat scan guided abdominal mass aspiration with drain placement by Dr. Nacho Cohen Left 02/25/2021    LEFT PLEURAL CATHETER INSERTION performed by Dereck Butcher MD at [place] [id number] Left 01/29/2021    total of 755 cc removed per Dr David Mathias specimen sent to lab         [address]       Current Discharge Medication List        CONTINUE these medications which have NOT CHANGED    Details   Lactobacillus TABS Take by mouth daily      piperacillin-tazobactam (ZOSYN) 4-0.5 GM per 100ML IVPB Infuse 13.5 mg intravenously daily Pt  get 13.5 grams  over 24 hours  via easy pump      Lactobacillus Acidophilus POWD by Does not apply route      rivaroxaban (XARELTO) 15 MG TABS tablet Take 1 tablet by mouth daily  Qty: 90 tablet, Refills: 3    Associated Diagnoses: Paroxysmal atrial fibrillation (HCC)      meclizine (ANTIVERT) 25 MG tablet Take by mouth daily      acetaminophen (TYLENOL) 325 MG tablet Take 650 mg by mouth every 6 hours as needed for Pain      Vitamin D (CHOLECALCIFEROL) 50 MCG (2000 UT) TABS tablet Take 1 tablet by mouth Daily with supper  Qty: 60 tablet, Refills: 5    Comments: Labeling may look different.   25 mcg=1000 Units. Please double check dosages. potassium chloride (KLOR-CON M) 20 MEQ extended release tablet Take 20 mEq by mouth daily (with breakfast) Indications: Nutritional Support      furosemide (LASIX) 20 MG tablet TAKE 1 TABLET DAILY  Qty: 90 tablet, Refills: 3    Associated Diagnoses: Essential hypertension      pantoprazole (PROTONIX) 40 MG tablet TAKE 1 TABLET DAILY  Qty: 90 tablet, Refills: 3    Associated Diagnoses: Essential hypertension      carvedilol (COREG) 6.25 MG tablet TAKE 1 TABLET TWICE A DAY  Qty: 180 tablet, Refills: 3    Associated Diagnoses: Essential hypertension      nitroGLYCERIN (NITROSTAT) 0.4 MG SL tablet up to max of 3 total doses. If no relief after 1 dose, call 911. Qty: 25 tablet, Refills: 3      ferrous sulfate (IRON 325) 325 (65 Fe) MG tablet Take 1 tablet by mouth 2 times daily (with meals)  Qty: 30 tablet, Refills: 3      Carboxymethylcellulose Sodium (EYE DROPS OP) Apply 1 drop to eye as needed (Dry eyes) Indications: Systane       Boswellia-Glucosamine-Vit D (OSTEO BI-FLEX ONE PER DAY) TABS Take 1 tablet by mouth daily Indications: Nutritional Support      Multiple Vitamins-Minerals (CENTRUM SILVER ULTRA WOMENS) TABS Take 1 tablet by mouth daily Indications: Nutritional Support      vitamin B-12 (CYANOCOBALAMIN) 1000 MCG tablet Take 1,000 mcg by mouth daily Indications: Nutritional Support      citalopram (CELEXA) 20 MG tablet Take 20 mg by mouth daily Indications: Depression      SYNTHROID 88 MCG tablet Take 88 mcg by mouth daily Indications: Underactive Thyroid             ALLERGIES     Latex and Tape Linzie Ángel tape]    FAMILY HISTORY       Family History   Problem Relation Age of Onset    Arthritis Mother     Arthritis Father     High Blood Pressure Father           SOCIAL HISTORY       Social History     Socioeconomic History    Marital status:      Number of children: 2   Tobacco Use    Smoking status: Never    Smokeless tobacco: Never   Vaping Use Vaping Use: Never used   Substance and Sexual Activity    Alcohol use: No     Alcohol/week: 0.0 standard drinks    Drug use: No    Sexual activity: Not Currently   Social History Narrative    , Lives With: Alone, son lives down the street, dtr is in the area    Type of Home: [address]: One level    Home Access: Stairs to enter with rails- Number of Steps: 2- Rails: Both    Bathroom Shower/Tub: Tub/Shower unit, Bathroom Equipment: Grab bars in shower, Shower chair    Home Equipment: Rolling walker, Cane(Pt infrequemtly uses DME for ambulation and prefers to furniture walk in home)    ADL Assistance: Independent, [address]: [address] Responsibilities: Yes    Ambulation Assistance: Independent, Transfer Assistance: Independent    Additional Comments: Son stops over 2 times daily           SCREENINGS        [place] Coma Scale  Eye Opening: Spontaneous  Best Verbal Response: Oriented  Best Motor Response: Obeys commands  [place] Coma Scale Score: 15               PHYSICAL EXAM    (up to 7 for level 4, 8 or more for level 5)     ED Triage Vitals [11/11/22 1204]   BP Temp Temp Source Heart Rate Resp SpO2 Height Weight   (!) 140/95 98.4 °F (36.9 °C) Oral 91 18 92 % 5' (1.524 m) 160 lb (72.6 kg)       Physical Exam  Constitutional:       General: She is not in acute distress. Appearance: She is well-developed. She is ill-appearing. She is not diaphoretic. HENT:      Head: Normocephalic and atraumatic. Right Ear: External ear normal.      Left Ear: External ear normal.      Nose: Nose normal.      Mouth/Throat:      Mouth: Mucous membranes are moist.      Pharynx: No oropharyngeal exudate. Eyes:      Extraocular Movements: Extraocular movements intact. Conjunctiva/sclera: Conjunctivae normal.      Pupils: Pupils are equal, round, and reactive to light. Neck:      Thyroid: No thyromegaly. Vascular: No JVD.       Trachea: No tracheal deviation. Cardiovascular:      Rate and Rhythm: Normal rate. Heart sounds: Normal heart sounds. No murmur heard. Pulmonary:      Effort: Pulmonary effort is normal. No respiratory distress. Breath sounds: Examination of the right-lower field reveals rales. Examination of the left-lower field reveals rales. Rales present. No wheezing. Abdominal:      General: Bowel sounds are normal.      Palpations: Abdomen is soft. Tenderness: There is no abdominal tenderness. There is no guarding. Musculoskeletal:         General: Normal range of motion. Cervical back: Normal range of motion and neck supple. Right lower leg: Edema present. Left lower leg: Edema present. Skin:     General: Skin is warm and dry. Findings: No rash. Neurological:      General: No focal deficit present. Mental Status: She is alert and oriented to person, place, and time. Cranial Nerves: No cranial nerve deficit. Psychiatric:         Behavior: Behavior normal.       DIAGNOSTIC RESULTS     EKG: All EKG's are interpreted by the Emergency Department Physician who either signs or Co-signs this chart in the absence of a cardiologist.    Atrial flutter with variable AV block Left axis deviation Right bundle branch block Possible Lateral infarct , age undetermined    RADIOLOGY:   Non-plain film images such as CT, Ultrasound and MRI are read by the radiologist. Plain radiographic images are visualized and preliminarily interpreted by the emergency physician with the below findings:        Interpretation per the Radiologist below, if available at the time of this note:    XR CHEST PORTABLE   Final Result   Cardiomegaly with multifocal bilateral airspace disease favored to represent   CHF      Left lung base consolidation which could represent atelectasis, pleural   effusion or less likely pneumonia. .         CTA CHEST W WO CONTRAST PE Eval    (Results Pending)         ED BEDSIDE ULTRASOUND: Performed by ED Physician - none    LABS:  Labs Reviewed   PROTIME-INR - Abnormal; Notable for the following components:       Result Value    Protime 18.9 (*)     All other components within normal limits   COMPREHENSIVE METABOLIC PANEL - Abnormal; Notable for the following components:    Potassium 3.2 (*)     Glucose 129 (*)     Albumin 3.0 (*)     ALT 44 (*)     AST 49 (*)     All other components within normal limits   CBC WITH AUTO DIFFERENTIAL - Abnormal; Notable for the following components:    Hemoglobin 11.1 (*)     Hematocrit 35.3 (*)     MCH 26.2 (*)     MCHC 31.5 (*)     RDW 17.1 (*)     Lymphocytes Absolute 0.7 (*)     All other components within normal limits   D-DIMER, QUANTITATIVE - Abnormal; Notable for the following components:    D-Dimer, Quant 1.55 (*)     All other components within normal limits    Narrative:     CALL  Dial  [facility] tel. [id number],  Dimer results called to and read back by Dr. Seamus Wong, 11/11/2022 14:30, by  Monty Hong   POCT ARTERIAL - Abnormal; Notable for the following components:    POC Potassium 3.2 (*)     POC Glucose 142 (*)     pO2, Arterial 84 (*)     O2 Sat, Arterial 96 (*)     All other components within normal limits   BRAIN NATRIURETIC PEPTIDE   TROPONIN   URINALYSIS   CBC WITH AUTO DIFFERENTIAL   BASIC METABOLIC PANEL   POCT EPOC BLOOD GAS, LACTIC ACID, ICA       All other labs were within normal range or not returned as of this dictation. EMERGENCY DEPARTMENT COURSE and DIFFERENTIAL DIAGNOSIS/MDM:   Vitals:    Vitals:    11/11/22 1430 11/11/22 1733 11/11/22 1738 11/11/22 1739   BP: 116/88 (!) 136/93     Pulse: 93 82  90   Resp: 23 24     Temp:  98 °F (36.7 °C)     TempSrc:  Oral     SpO2: 97% 95%     Weight:   165 lb 3.2 oz (74.9 kg)    Height:   4' 11\" (1.499 m)        Patient here with shortness of breath and leg edema found to be in congestive heart failure.   Patient will require hospitalization for further IV diuresis and cardiology evaluation. MDM        REASSESSMENT          CRITICAL CARE TIME   Total Critical Care time was 0 minutes, excluding separately reportable procedures. There was a high probability of clinically significant/life threatening deterioration in the patient's condition which required my urgent intervention. CONSULTS:  IP CONSULT TO CARDIOLOGY  IP CONSULT TO CARDIOLOGY  IP CONSULT TO INFECTIOUS DISEASES  IP CONSULT TO SOCIAL WORK    PROCEDURES:  Unless otherwise noted below, none     Procedures        FINAL IMPRESSION      1. Systolic congestive heart failure, unspecified HF chronicity ([facility] [address]    2. Dyspnea, unspecified type          DISPOSITION/PLAN   DISPOSITION Admitted 11/11/2022 02:41:45 PM      PATIENT REFERRED TO:  No follow-up provider specified. DISCHARGE MEDICATIONS:  Current Discharge Medication List        Controlled Substances Monitoring:     RX Monitoring 4/22/2016   Attestation The Prescription Monitoring Report for this patient was reviewed today. Periodic Controlled Substance Monitoring Possible medication side effects, risk of tolerance and/or dependence, and alternative treatments discussed; No signs of potential drug abuse or diversion identified. ;Medication contract signed today. ;Random urine drug screen sent today.        (Please note that portions of this note were completed with a voice recognition program.  Efforts were made to edit the dictations but occasionally words are mis-transcribed.)    Steve Herman DO (electronically signed)  Attending Emergency Physician            Steve Herman DO  11/11/22 7160

## 2022-11-11 NOTE — H&P
Patient is frequent [place] all information was obtained by the son at bedside. Patient recently discharged from nursing nursing home on IV antibiotics for intra-abdominal abscess with Invanz then switch to zosyn also was supposed to follow-up with ID as outpatient comes in here for shortness of breath. Patient has chronic respiratory failure on 2 to 3 L oxygen therapy at home comes in here for worsening shortness of breath, without cough chest x-ray was consistent with CHF exacerbation. Patient also has worsening leg swelling. Denies orthopnea. Echo from June of this year showed EF of 33% with diastolic dysfunction. Patient has elevated D-dimer is going for CT angio of the chest to rule out PE.   No chest pain, no nausea vomiting, no abdominal pain          Past Medical History:   Diagnosis Date    Ascending aortic aneurysm 6/23/2017    Charcot Arleen Tooth muscular atrophy     CHF (congestive heart failure) (HCC)     Chronic diastolic CHF (congestive heart failure) ([address] 4/1/2022    Depression     Descending aortic aneurysm ([facility] [address] 6/23/2017    Essential hypertension 2/16/2018    Headache     HTN (hypertension)     Impaired mobility and activities of daily living     Lumbar stenosis with neurogenic claudication     Myelopathy (HCC)     Osteoarthritis      Past Surgical History:   Procedure Laterality Date    IR INS PICC VAD W SQ PORT GREATER THAN 5  9/23/2022    IR INS PICC VAD W SQ PORT GREATER THAN 5 9/23/2022 [facility] SPECIAL PROCEDURE    JOINT REPLACEMENT Bilateral     knees    OTHER SURGICAL HISTORY Right 07/21/2022    cat scan guided abdominal mass aspiration with drain placement by Dr. Devendra Hernandez Left 02/25/2021    LEFT PLEURAL CATHETER INSERTION performed by Bertina Harada, MD at [place] [id number] Left 01/29/2021    total of 755 cc removed per Dr Kyra Bynum specimen sent to lab     Social History       Tobacco History       Smoking Status  Never      Smokeless Tobacco Use  Never up to max of 3 total doses. If no relief after 1 dose, call 911.) 25 tablet 3    budesonide-formoterol (SYMBICORT) 160-4.5 MCG/ACT AERO Inhale 2 puffs into the lungs 2 times daily (Patient taking differently: Inhale 2 puffs into the lungs in the morning and 2 puffs in the evening.  Indications: Difficulty Breathing.) 1 Inhaler 3    ferrous sulfate (IRON 325) 325 (65 Fe) MG tablet Take 1 tablet by mouth 2 times daily (with meals) (Patient taking differently: Take 325 mg by mouth 2 times daily (with meals) Indications: Anemia) 30 tablet 3    Carboxymethylcellulose Sodium (EYE DROPS OP) Apply 1 drop to eye as needed (Dry eyes) Indications: Systane       Boswellia-Glucosamine-Vit D (OSTEO BI-FLEX ONE PER DAY) TABS Take 1 tablet by mouth daily Indications: Nutritional Support      Multiple Vitamins-Minerals (CENTRUM SILVER ULTRA WOMENS) TABS Take 1 tablet by mouth daily Indications: Nutritional Support      vitamin B-12 (CYANOCOBALAMIN) 1000 MCG tablet Take 1,000 mcg by mouth daily Indications: Nutritional Support      citalopram (CELEXA) 20 MG tablet Take 20 mg by mouth daily Indications: Depression      SYNTHROID 88 MCG tablet Take 88 mcg by mouth daily Indications: Underactive Thyroid       ROS: 12 point review of system is negative except was stated in HPI  HEENT: AT/NC, PERRLA, no JVD  HEART: s1/s2 wnl w/o s3, no m.r.g  LUNG: + rales  ABD: soft, NT  EXT: + edema  SKin : no rash  Neuro:no focal deficits  1) acute diastolic HF  2) h/o pelvic abscess   3) hypothyroidsim  4) chronic respiratory failure  Admit to inpt  IV  antibiotics was changed from the last ID note to Zosyn, ID follow-up for duration of antibiotic  IV Lasix  Daily weights and monitor intake and output.  echo  Telemetry  C/w Oxygen therapy to keep O2 sat > 88%  FU CT CHEST

## 2022-11-11 NOTE — ACP (ADVANCE CARE PLANNING)
Advance Care Planning     Advance Care Planning Activator (Inpatient)  Conversation Note      Date of ACP Conversation: 11/11/2022     [employer] with: Son Jerry Kelly who is her POA he states. He states that there are no changes in her documuments. ACP Activator: Anshul Carreon RN    Health Care Decision Maker:     Current Designated Health Care Decision Maker:     Primary Decision Maker: Sampson Nelson - Child - [phone]    Care Preferences    Ventilation: \"If you were in your present state of health and suddenly became very ill and were unable to breathe on your own, what would your preference be about the use of a ventilator (breathing machine) if it were available to you? \"      Would the patient desire the use of ventilator (breathing machine)?: yes    \"If your health worsens and it becomes clear that your chance of recovery is unlikely, what would your preference be about the use of a ventilator (breathing machine) if it were available to you? \"     Would the patient desire the use of ventilator (breathing machine)?: No      Resuscitation  \"CPR works best to restart the heart when there is a sudden event, like a heart attack, in someone who is otherwise healthy. Unfortunately, CPR does not typically restart the heart for people who have serious health conditions or who are very sick. \"    \"In the event your heart stopped as a result of an underlying serious health condition, would you want attempts to be made to restart your heart (answer \"yes\" for attempt to resuscitate) or would you prefer a natural death (answer \"no\" for do not attempt to resuscitate)? \" yes       [x] Yes   [] No   Educated Patient / Willye Pair regarding differences between Advance Directives and portable DNR orders.     Length of ACP Conversation in minutes:  10    Conversation Outcomes:  [x] ACP discussion completed  [] Existing advance directive reviewed with patient; no changes to patient's previously recorded wishes  [] New Advance Directive completed  [] Portable Do Not Rescitate prepared for Provider review and signature  [] POLST/POST/MOLST/MOST prepared for Provider review and signature      Follow-up plan:    [] Schedule follow-up conversation to continue planning  [] Referred individual to Provider for additional questions/concerns   [] Advised patient/agent/surrogate to review completed ACP document and update if needed with changes in condition, patient preferences or care setting    [x] This note routed to one or more involved healthcare providers

## 2022-11-11 NOTE — ED NOTES
Pt medicated per written order. Brief and Marga applied to pt with pt's verbal consent.       Theodore Pereira RN  11/11/22 1897

## 2022-11-11 NOTE — FLOWSHEET NOTE
Admitted per son could not use  because pt s son takes care of her she does  not know  what meds she takes

## 2022-11-11 NOTE — ED TRIAGE NOTES
The patient came to the ED for shortness of breath x2 days along with upper-mid ABD pain. Pt usually wears 2L O2 at home but states it is difficult to take a deep breath.

## 2022-11-11 NOTE — PROGRESS NOTES
Pharmacy Note - Renal Dosing and Extended Infusion Beta-Lactam Adjustment    Piperacillin/Tazobactam  3375mg Q12h  for treatment of Intra-abdominal Infection. Per [facility] Renal Dose Adjustment Policy and Extended Infusion Beta-Lactam Policy, piperacillin/tazobactam will be changed to 4500mg loading dose followed by 3375mg Q8h extended infusion    Estimated Creatinine Clearance: Estimated Creatinine Clearance: 47 mL/min (based on SCr of 0.7 mg/dL). BMI: Body mass index is 31.25 kg/m². Please call with any questions.     Thank you,    Shorty Vieyra, [facility]

## 2022-11-11 NOTE — CARE COORDINATION
[facility] [place] Case Management Initial Discharge Assessment    Met with Family to discuss discharge plan. PCP: Shannon Mckeon MD                                Date of Last Visit: last week    [facility] Patient: No        [facility] Notified: no    If no PCP, list provided? N/A    Discharge Planning    Living Arrangements: independently at home    Who do you live with? self    Who helps you with your care:  family: her son lives in the same building and brings her food, sets up her meds, female family members help with showers. If lives at home:     Do you have any barriers navigating in your home? no    Patient can perform ADL? Needs assistance    Current Services (outpatient and in home) :  [facility] ([address] nurse comes weekly for iv antibiotics. Pt has a picc line.)    Dialysis: No    Is transportation available to get to your appointments? Yes    DME Equipment:  yes - walker    Respiratory equipment: Continuous Oxygen  3 Liters     Respiratory provider:  yes - med services     [facility]    Consult with Medication Assistance Program?  No      Patient agreeable to Yadiel Lee? Yes, Company continue with BRITTNEY Knapp    Does Patient Have a High-Risk for Readmission Diagnosis (CHF, PN, MI, COPD)? Yes pt being admitted by cardio. Order set not in yet. Initial Discharge Plan? (Note: please see concurrent daily documentation for any updates after initial note). Pt's son states that they would continue with [facility] services. They would need a resume. Cm to assess for further d/c needs.     Readmission Risk              Risk of Unplanned Readmission:  0         Electronically signed by Kayley Hayes RN on 11/11/2022 at 3:10 PM

## 2022-11-12 LAB
ANION GAP SERPL CALCULATED.3IONS-SCNC: 8 MEQ/L (ref 9–15)
BASOPHILS ABSOLUTE: 0.1 K/UL (ref 0–0.2)
BASOPHILS RELATIVE PERCENT: 1.1 %
BUN BLDV-MCNC: 14 MG/DL (ref 8–23)
CALCIUM SERPL-MCNC: 8.7 MG/DL (ref 8.5–9.9)
CHLORIDE BLD-SCNC: 101 MEQ/L (ref 95–107)
CO2: 33 MEQ/L (ref 20–31)
CREAT SERPL-MCNC: 0.7 MG/DL (ref 0.5–0.9)
EOSINOPHILS ABSOLUTE: 0.1 K/UL (ref 0–0.7)
EOSINOPHILS RELATIVE PERCENT: 2.5 %
GFR SERPL CREATININE-BSD FRML MDRD: >60 ML/MIN/{1.73_M2}
GLUCOSE BLD-MCNC: 111 MG/DL (ref 70–99)
HCT VFR BLD CALC: 33.1 % (ref 37–47)
HEMOGLOBIN: 10.9 G/DL (ref 12–16)
LV EF: 40 %
LVEF MODALITY: NORMAL
LYMPHOCYTES ABSOLUTE: 0.6 K/UL (ref 1–4.8)
LYMPHOCYTES RELATIVE PERCENT: 11.2 %
MCH RBC QN AUTO: 27.2 PG (ref 27–31.3)
MCHC RBC AUTO-ENTMCNC: 32.9 % (ref 33–37)
MCV RBC AUTO: 82.6 FL (ref 79.4–94.8)
MONOCYTES ABSOLUTE: 0.7 K/UL (ref 0.2–0.8)
MONOCYTES RELATIVE PERCENT: 12 %
NEUTROPHILS ABSOLUTE: 4.1 K/UL (ref 1.4–6.5)
NEUTROPHILS RELATIVE PERCENT: 73.2 %
PDW BLD-RTO: 17.5 % (ref 11.5–14.5)
PLATELET # BLD: 168 K/UL (ref 130–400)
POTASSIUM SERPL-SCNC: 3.4 MEQ/L (ref 3.4–4.9)
RBC # BLD: 4.01 M/UL (ref 4.2–5.4)
SODIUM BLD-SCNC: 142 MEQ/L (ref 135–144)
WBC # BLD: 5.7 K/UL (ref 4.8–10.8)

## 2022-11-12 PROCEDURE — 93306 TTE W/DOPPLER COMPLETE: CPT

## 2022-11-12 PROCEDURE — 6370000000 HC RX 637 (ALT 250 FOR IP): Performed by: INTERNAL MEDICINE

## 2022-11-12 PROCEDURE — 99233 SBSQ HOSP IP/OBS HIGH 50: CPT | Performed by: INTERNAL MEDICINE

## 2022-11-12 PROCEDURE — 80048 BASIC METABOLIC PNL TOTAL CA: CPT

## 2022-11-12 PROCEDURE — 2060000000 HC ICU INTERMEDIATE R&B

## 2022-11-12 PROCEDURE — 6360000004 HC RX CONTRAST MEDICATION: Performed by: INTERNAL MEDICINE

## 2022-11-12 PROCEDURE — 99232 SBSQ HOSP IP/OBS MODERATE 35: CPT | Performed by: INTERNAL MEDICINE

## 2022-11-12 PROCEDURE — 6360000002 HC RX W HCPCS: Performed by: INTERNAL MEDICINE

## 2022-11-12 PROCEDURE — 85025 COMPLETE CBC W/AUTO DIFF WBC: CPT

## 2022-11-12 PROCEDURE — 2700000000 HC OXYGEN THERAPY PER DAY

## 2022-11-12 PROCEDURE — 36415 COLL VENOUS BLD VENIPUNCTURE: CPT

## 2022-11-12 PROCEDURE — 2580000003 HC RX 258: Performed by: INTERNAL MEDICINE

## 2022-11-12 RX ORDER — LOSARTAN POTASSIUM 100 MG/1
100 TABLET ORAL DAILY
Status: DISCONTINUED | OUTPATIENT
Start: 2022-11-12 | End: 2022-11-13

## 2022-11-12 RX ORDER — POTASSIUM CHLORIDE 20 MEQ/1
20 TABLET, EXTENDED RELEASE ORAL 2 TIMES DAILY
Status: DISCONTINUED | OUTPATIENT
Start: 2022-11-12 | End: 2022-11-16 | Stop reason: HOSPADM

## 2022-11-12 RX ADMIN — LEVOTHYROXINE SODIUM 88 MCG: 88 TABLET ORAL at 09:14

## 2022-11-12 RX ADMIN — FERROUS SULFATE TAB 325 MG (65 MG ELEMENTAL FE) 325 MG: 325 (65 FE) TAB at 17:47

## 2022-11-12 RX ADMIN — RIVAROXABAN 15 MG: 15 TABLET, FILM COATED ORAL at 09:10

## 2022-11-12 RX ADMIN — DIGOXIN 125 MCG: 125 TABLET ORAL at 09:10

## 2022-11-12 RX ADMIN — FUROSEMIDE 20 MG: 10 INJECTION, SOLUTION INTRAMUSCULAR; INTRAVENOUS at 17:47

## 2022-11-12 RX ADMIN — LACTOBACILLUS TAB 4 TABLET: TAB at 14:04

## 2022-11-12 RX ADMIN — PIPERACILLIN AND TAZOBACTAM 3375 MG: 3; .375 INJECTION, POWDER, FOR SOLUTION INTRAVENOUS at 01:19

## 2022-11-12 RX ADMIN — LOSARTAN POTASSIUM 100 MG: 100 TABLET, FILM COATED ORAL at 09:10

## 2022-11-12 RX ADMIN — PERFLUTREN 1.5 ML: 6.52 INJECTION, SUSPENSION INTRAVENOUS at 09:09

## 2022-11-12 RX ADMIN — CARVEDILOL 6.25 MG: 6.25 TABLET, FILM COATED ORAL at 09:14

## 2022-11-12 RX ADMIN — ACETAMINOPHEN 650 MG: 325 TABLET ORAL at 15:46

## 2022-11-12 RX ADMIN — PIPERACILLIN AND TAZOBACTAM 3375 MG: 3; .375 INJECTION, POWDER, FOR SOLUTION INTRAVENOUS at 10:07

## 2022-11-12 RX ADMIN — POTASSIUM CHLORIDE 20 MEQ: 1500 TABLET, EXTENDED RELEASE ORAL at 09:14

## 2022-11-12 RX ADMIN — PANTOPRAZOLE SODIUM 40 MG: 40 TABLET, DELAYED RELEASE ORAL at 09:14

## 2022-11-12 RX ADMIN — LACTOBACILLUS TAB 4 TABLET: TAB at 09:09

## 2022-11-12 RX ADMIN — Medication 10 ML: at 20:44

## 2022-11-12 RX ADMIN — LACTOBACILLUS TAB 4 TABLET: TAB at 20:44

## 2022-11-12 RX ADMIN — Medication 10 ML: at 09:15

## 2022-11-12 RX ADMIN — FUROSEMIDE 20 MG: 10 INJECTION, SOLUTION INTRAMUSCULAR; INTRAVENOUS at 09:10

## 2022-11-12 RX ADMIN — PIPERACILLIN AND TAZOBACTAM 3375 MG: 3; .375 INJECTION, POWDER, FOR SOLUTION INTRAVENOUS at 17:54

## 2022-11-12 RX ADMIN — POTASSIUM CHLORIDE 20 MEQ: 1500 TABLET, EXTENDED RELEASE ORAL at 20:44

## 2022-11-12 RX ADMIN — FERROUS SULFATE TAB 325 MG (65 MG ELEMENTAL FE) 325 MG: 325 (65 FE) TAB at 09:14

## 2022-11-12 RX ADMIN — CITALOPRAM HYDROBROMIDE 20 MG: 20 TABLET ORAL at 09:14

## 2022-11-12 RX ADMIN — CARVEDILOL 6.25 MG: 6.25 TABLET, FILM COATED ORAL at 20:44

## 2022-11-12 ASSESSMENT — ENCOUNTER SYMPTOMS
BLOOD IN STOOL: 0
GASTROINTESTINAL NEGATIVE: 1
NAUSEA: 0
STRIDOR: 0
WHEEZING: 0
EYES NEGATIVE: 1
SHORTNESS OF BREATH: 1
COUGH: 0
CHEST TIGHTNESS: 0

## 2022-11-12 ASSESSMENT — PAIN DESCRIPTION - LOCATION: LOCATION: HEAD

## 2022-11-12 ASSESSMENT — PAIN SCALES - GENERAL
PAINLEVEL_OUTOF10: 0
PAINLEVEL_OUTOF10: 0
PAINLEVEL_OUTOF10: 4

## 2022-11-12 NOTE — PROGRESS NOTES
Internal Medicine   Hospitalist   Progress Note    2022   1:43 PM    Name:  Ashwin Arias  MRN:    [id number]     IP Day: 1     Admit Date: 2022 12:04 PM  PCP: Joanna Tyson MD    Code Status:  Full Code    Assessment and Plan: Active Problems/ diagnosis:     Acute on chronic diastolic heart failure  A. fib on Xarelto  Abdominal abscess-on IV antibiotics  Elevated G-esyfq-upafqzok due to infection, no PE on CTA  AAA-5.3cm, patient and family not interested in surgery at this time  HTN  CAD-no chest pain, stable    Plan  Resume diuresis as per cardiologist  Resume IV Zosyn, infectious disease is following  Monitor renal function and electrolytes  Resume Xarelto, digoxin, Coreg  And discussed plan of care with patient  DVT PPx     7 pm- 7 am, please contact on call Hospitalist for any needs     Subjective:      no new events. Feels well. No chest pain.      Physical Examination:      Vitals:  BP (!) 162/94   Pulse 74   Temp 99.5 °F (37.5 °C) (Oral)   Resp 18   Ht 4' 11\" (1.499 m)   Wt 165 lb 3.2 oz (74.9 kg)   SpO2 97%   BMI 33.37 kg/m²   Temp (24hrs), Av.2 °F (36.8 °C), Min:97.3 °F (36.3 °C), Max:99.5 °F (37.5 °C)      General appearance: alert, cooperative and no distress  Mental Status: oriented to person, place and time and normal affect  Lungs: clear to auscultation bilaterally, normal effort  Heart: regular rate   Abdomen: soft, nontender, nondistended, bowel sounds present, no masses  Extremities: + leg edema, no redness, tenderness in the calves  Skin: no gross lesions, rashes    Data:     Labs:  Recent Labs     22  1230 22  1232 22  0455   WBC 6.8  --  5.7   HGB 11.1* 12.3 10.9*     --  168     Recent Labs     22  1230 22  1232 22  0455     --  142   K 3.2*  --  3.4     --  101   CO2 26  --  33*   BUN 16  --  14   CREATININE 0.51 0.7 0.70   GLUCOSE 129*  --  111*     Recent Labs     22  1230   AST 49*   ALT 44* BILITOT 0.5   ALKPHOS 61       Current Facility-Administered Medications   Medication Dose Route Frequency Provider Last Rate Last Admin    potassium chloride (KLOR-CON M) extended release tablet 20 mEq  20 mEq Oral BID Micki Rae MD   20 mEq at 11/12/22 0914    losartan (COZAAR) tablet 100 mg  100 mg Oral Daily Micki Rae MD   100 mg at 11/12/22 0910    furosemide (LASIX) injection 20 mg  20 mg IntraVENous BID Bautista Grimes MD   20 mg at 11/12/22 0910    sodium chloride flush 0.9 % injection 5-40 mL  5-40 mL IntraVENous 2 times per day Bautista Grimes MD   10 mL at 11/12/22 0915    sodium chloride flush 0.9 % injection 5-40 mL  5-40 mL IntraVENous PRN Bautista Grimes MD        0.9 % sodium chloride infusion   IntraVENous PRN Bautista Grimes MD        ondansetron (ZOFRAN-ODT) disintegrating tablet 4 mg  4 mg Oral Q8H PRN Bautista Grimes MD        Or    ondansetron (ZOFRAN) injection 4 mg  4 mg IntraVENous Q6H PRN Bautista Grimes MD        polyethylene glycol (GLYCOLAX) packet 17 g  17 g Oral Daily PRN Bautista Grimes MD        acetaminophen (TYLENOL) tablet 650 mg  650 mg Oral Q6H PRN Bautista Grimes MD        Or    acetaminophen (TYLENOL) suppository 650 mg  650 mg Rectal Q6H PRN Bautista Grimes MD        digoxin (LANOXIN) tablet 125 mcg  125 mcg Oral Daily Micki Rae MD   125 mcg at 11/12/22 0910    vitamin D (CHOLECALCIFEROL) tablet 2,000 Units  2,000 Units Oral Dinner Bautista Grimes MD   2,000 Units at 11/11/22 1838    rivaroxaban (XARELTO) tablet 15 mg  15 mg Oral Daily with breakfast Bautista Grimes MD   15 mg at 11/12/22 0910    levothyroxine (SYNTHROID) tablet 88 mcg  88 mcg Oral Daily Bautista Grimes MD   88 mcg at 11/12/22 0914    ferrous sulfate (IRON 325) tablet 325 mg  325 mg Oral BID WC Bautista Grimes MD   325 mg at 11/12/22 0914    carvedilol (COREG) tablet 6.25 mg  6.25 mg Oral BID Bautista Grimes MD   6.25 mg at 11/12/22 0914    citalopram (CELEXA) tablet 20 mg  20 mg Oral Daily Bautista Grimes MD   20 mg at 11/12/22 0914    pantoprazole (PROTONIX) tablet 40 mg  40 mg Oral Daily Demond Crump MD   40 mg at 11/12/22 0914    lactobacillus acidophilus [facility]) 4 tablet  4 tablet Oral TID Demond Crump MD   4 tablet at 11/12/22 0909    piperacillin-tazobactam (ZOSYN) 4,500 mg in dextrose 5 % 100 mL IVPB (mini-bag)  4,500 mg IntraVENous Once Demond Crump MD        Followed by    piperacillin-tazobactam (ZOSYN) 3,375 mg in dextrose 5 % 50 mL IVPB (mini-bag)  3,375 mg IntraVENous Q8H Demond Crump MD 12.5 mL/hr at 11/12/22 1007 3,375 mg at 11/12/22 1007       Additional work up or/and treatment plan may be added today or then after based on clinical progression. I am managing a portion of pt care. Some medical issues are handled by other specialists. Additional work up and treatment should be done in out pt setting by pt PCP and other out pt providers. In addition to examining and evaluating pt, I spent additional time explaining care, normaland abnormal findings, and treatment plan. All of pt questions were answered. Counseling, diet and education were provided. Case will be discussed with nursing staff when appropriate. Family will be updated if and when appropriate.        Electronically signed by Ovi Isaac DO on 11/12/2022 at 1:43 PM

## 2022-11-12 NOTE — CONSULTS
Brenna Rodriguez  7/12/1932  female  Medical Record Number: [id number]    Patient informed that I am an Infectious Disease physician and permission obtained from the patient to speak in front of any visitors prior to any discussion for HIPPA purposes. HPI: Patient with hx of intrabdominal abscess treated with percutaneous draiange on Invanz initially, changed to Zosyn for possible pseudomonas infection by Dr Onur Franks as outpatient - she came in for increased SOB. Per MR, she had been treated initially with Zosyn for weeks. She was off of abx for approx 2 weeks prior to starting the invanz, which started on 9/14/2022 x 30 days course. Dr Onur Franks changed the abx on 10/28/22 to Zosyn x 6 weeks course from that date. Per medical record, patient was not a surgical candidate. She states that she has some intrabdominal pain one day ago but no pain now and she \" is fine\"   Currently on O2 but denies SOB.     + hx of Afib, AAA, CAD      Subjectively, no new complaints at this time.      Review of Systems: All 14 review of systems were discussed with the patient and are negative other than as stated above      Past Medical History:   Diagnosis Date    Ascending aortic aneurysm 6/23/2017    Charcot Arleen Tooth muscular atrophy     CHF (congestive heart failure) (HCC)     Chronic diastolic CHF (congestive heart failure) ([facility] 4/1/2022    Depression     Descending aortic aneurysm ([facility] [address] 6/23/2017    Essential hypertension 2/16/2018    Headache     HTN (hypertension)     Impaired mobility and activities of daily living     Lumbar stenosis with neurogenic claudication     Myelopathy [facility])     Osteoarthritis        Past Surgical History:   Procedure Laterality Date    IR INS PICC VAD W SQ PORT GREATER THAN 5  9/23/2022    IR INS PICC VAD W SQ PORT GREATER THAN 5 9/23/2022 [facility] PROCEDURE    JOINT REPLACEMENT Bilateral     knees    OTHER SURGICAL HISTORY Right 07/21/2022    cat scan guided abdominal mass aspiration with drain placement by Dr. Regis Rubin Left 02/25/2021    LEFT PLEURAL CATHETER INSERTION performed by Kirill Ruano MD at [place] [id number] Left 01/29/2021    total of 755 cc removed per Dr Paige Franco specimen sent to lab       Social History     Socioeconomic History    Marital status:       Spouse name: Not on file    Number of children: 2    Years of education: Not on file    Highest education level: Not on file   Occupational History    Not on file   Tobacco Use    Smoking status: Never    Smokeless tobacco: Never   Vaping Use    Vaping Use: Never used   Substance and Sexual Activity    Alcohol use: No     Alcohol/week: 0.0 standard drinks    Drug use: No    Sexual activity: Not Currently   Other Topics Concern    Not on file   Social History Narrative    , Lives With: Alone, son lives down the street, dtr is in the area    Type of Home: [address]: One level    Home Access: Stairs to enter with rails- Number of Steps: 2- Rails: Both    Bathroom Shower/Tub: Tub/Shower unit, Bathroom Equipment: Grab bars in shower, Shower chair    Home Equipment: Rolling walker, Cane(Pt infrequemtly uses DME for ambulation and prefers to furniture walk in home)    ADL Assistance: Independent, [address]: Independent    Homemaking Responsibilities: Yes    Ambulation Assistance: Independent, Transfer Assistance: Independent    Additional Comments: Son stops over 2 times daily         Social Determinants of Health     Financial Resource Strain: Not on file   Food Insecurity: Not on file   Transportation Needs: Not on file   Physical Activity: Not on file   Stress: Not on file   Social Connections: Not on file   Intimate Partner Violence: Not on file   Housing Stability: Not on file       Family History   Problem Relation Age of Onset    Arthritis Mother     Arthritis Father     High Blood Pressure Father        No current facility-administered medications on file prior to encounter. Current Outpatient Medications on File Prior to Encounter   Medication Sig Dispense Refill    Lactobacillus TABS Take by mouth daily      piperacillin-tazobactam (ZOSYN) 4-0.5 GM per 100ML IVPB Infuse 13.5 mg intravenously daily Pt  get 13.5 grams  over 24 hours  via easy pump      Lactobacillus Acidophilus POWD by Does not apply route      rivaroxaban (XARELTO) 15 MG TABS tablet Take 1 tablet by mouth daily 90 tablet 3    meclizine (ANTIVERT) 25 MG tablet Take by mouth daily      acetaminophen (TYLENOL) 325 MG tablet Take 650 mg by mouth every 6 hours as needed for Pain      Vitamin D (CHOLECALCIFEROL) 50 MCG (2000 UT) TABS tablet Take 1 tablet by mouth Daily with supper (Patient taking differently: Take 2,000 Units by mouth Daily with supper Indications: Nutritional Support) 60 tablet 5    potassium chloride (KLOR-CON M) 20 MEQ extended release tablet Take 20 mEq by mouth daily (with breakfast) Indications: Nutritional Support      furosemide (LASIX) 20 MG tablet TAKE 1 TABLET DAILY (Patient taking differently: Take 20 mg by mouth daily Indications: Congestive Heart Failure) 90 tablet 3    [DISCONTINUED] digoxin (LANOXIN) 125 MCG tablet TAKE 1 TABLET DAILY 90 tablet 3    [DISCONTINUED] amLODIPine (NORVASC) 5 MG tablet Take 1 tablet by mouth daily 90 tablet 3    pantoprazole (PROTONIX) 40 MG tablet TAKE 1 TABLET DAILY (Patient taking differently: Take 40 mg by mouth daily Indications: Ulcer) 90 tablet 3    carvedilol (COREG) 6.25 MG tablet TAKE 1 TABLET TWICE A DAY (Patient taking differently: Take 6.25 mg by mouth 2 times daily Indications: High Blood Pressure Disorder) 180 tablet 3    nitroGLYCERIN (NITROSTAT) 0.4 MG SL tablet up to max of 3 total doses. If no relief after 1 dose, call 911. (Patient taking differently: Place 0.4 mg under the tongue every 5 minutes as needed Indications: Chest Pain up to max of 3 total doses.  If no relief after 1 dose, call 911.) 25 tablet 3    ferrous sulfate (IRON 325) 325 (65 Fe) MG tablet Take 1 tablet by mouth 2 times daily (with meals) (Patient taking differently: Take 325 mg by mouth 2 times daily (with meals) Indications: Anemia) 30 tablet 3    Carboxymethylcellulose Sodium (EYE DROPS OP) Apply 1 drop to eye as needed (Dry eyes) Indications: Systane       Boswellia-Glucosamine-Vit D (OSTEO BI-FLEX ONE PER DAY) TABS Take 1 tablet by mouth daily Indications: Nutritional Support      Multiple Vitamins-Minerals (CENTRUM SILVER ULTRA WOMENS) TABS Take 1 tablet by mouth daily Indications: Nutritional Support      vitamin B-12 (CYANOCOBALAMIN) 1000 MCG tablet Take 1,000 mcg by mouth daily Indications: Nutritional Support      citalopram (CELEXA) 20 MG tablet Take 20 mg by mouth daily Indications: Depression      SYNTHROID 88 MCG tablet Take 88 mcg by mouth daily Indications: Underactive Thyroid         Physical Exam:    General: Patient appears in no acute distress, cooperative  Skin: no new rashes or erythema or induration  HEENT: EOMI, MMM, Neck is supple  Heart: S1 S2  Lungs: bilaterally clear to auscultation  Abdomen: soft, ND, NTTP, +BS  Extrem:+pulses, no calf pain, no asymmetry of the upper or lower extremities  Neuro exam: CN II-XII intact, facial expressions symmertrical bilaterally, no slurred speech      Labs: I have reviewed all lab results by electronic record, including most recent CBC, metabolic panel, and pertinent abnormalities were addressed from an infectious disease perspective. WBC trends are being monitored.     Lab Results   Component Value Date/Time     11/11/2022 12:30 PM    K 3.2 11/11/2022 12:30 PM    K 3.2 07/24/2022 05:00 AM     11/11/2022 12:30 PM    CO2 26 11/11/2022 12:30 PM    BUN 16 11/11/2022 12:30 PM    CREATININE 0.7 11/11/2022 12:32 PM    CREATININE 0.51 11/11/2022 12:30 PM    GLUCOSE 129 11/11/2022 12:30 PM    CALCIUM 8.9 11/11/2022 12:30 PM      Lab Results   Component Value Date    WBC 6.8 11/11/2022    HGB 12.3 11/11/2022    HCT 35.3 (L) 11/11/2022    MCV 83.3 11/11/2022     11/11/2022       Radiology:   I have reviewed imaging results per electronic record and most pertinent abnormalities are being addressed from an infectious disease standpoint. Last CT was 10/19/22 which still showed large abscess. Culture no grown on 11/2 and 8/16  Surgical cx with strep viridans on 7/21/22  Estimated Creatinine Clearance: 50 mL/min (based on SCr of 0.7 mg/dL). Assessment:  Intraabdominal abscess - large and persistent. Now on Zosyn. Not a surgical candidate per MR    Plan:  Stop date for the abx will be December 5, 2022. Follow up that week in the ID clinic please. Weekly CBC, BMP, CRP    This was a requested consult  Above is my noted response to the concern that prompted the consult. Communication directed toward referring primary.      Avis Casas D.O.

## 2022-11-12 NOTE — PROGRESS NOTES
Progress Note  Patient: Samuel Kunz  Unit/Bed: G476/O523-74  YOB: 1932  MRN: [id number]  Acct: [de-identified]   Admitting Diagnosis: Acute diastolic HF (heart failure) (HCC) [I50.31]  CHF (congestive heart failure), NYHA class I, acute on chronic, combined ([address] [I50.43]  Dyspnea, unspecified type [S44.86]  Systolic congestive heart failure, unspecified HF chronicity ([address] [I50.20]  Admit Date:  11/11/2022  Hospital Day: 1    Chief Complaint: CHF    Histories:  Past Medical History:   Diagnosis Date    Ascending aortic aneurysm 6/23/2017    Charcot Arleen Tooth muscular atrophy     CHF (congestive heart failure) (HCC)     Chronic diastolic CHF (congestive heart failure) ([address] 4/1/2022    Depression     Descending aortic aneurysm ([facility] [address] 6/23/2017    Essential hypertension 2/16/2018    Headache     HTN (hypertension)     Impaired mobility and activities of daily living     Lumbar stenosis with neurogenic claudication     Myelopathy (HCC)     Osteoarthritis      Past Surgical History:   Procedure Laterality Date    IR INS PICC VAD W SQ PORT GREATER THAN 5  9/23/2022    IR INS PICC VAD W SQ PORT GREATER THAN 5 9/23/2022 [facility] SPECIAL PROCEDURE    JOINT REPLACEMENT Bilateral     knees    OTHER SURGICAL HISTORY Right 07/21/2022    cat scan guided abdominal mass aspiration with drain placement by Dr. Tameka Chandra Left 02/25/2021    LEFT PLEURAL CATHETER INSERTION performed by Dorita Elmore MD at [place] [address] Left 01/29/2021    total of 755 cc removed per Dr Schmidt  specimen sent to lab     Family History   Problem Relation Age of Onset    Arthritis Mother     Arthritis Father     High Blood Pressure Father      Social History     Socioeconomic History    Marital status:      Number of children: 2   Tobacco Use    Smoking status: Never    Smokeless tobacco: Never   Vaping Use    Vaping Use: Never used   Substance and Sexual Activity    Alcohol use: No     Alcohol/week: 0.0 standard drinks    Drug use: No    Sexual activity: Not Currently   Social History Narrative    , Lives With: Alone, son lives down the street, dtr is in the area    Type of Home: [address]: One level    Home Access: Stairs to enter with rails- Number of Steps: 2- Rails: Both    Bathroom Shower/Tub: Tub/Shower unit, Bathroom Equipment: Grab bars in shower, Shower chair    Home Equipment: Rolling walker, Cane(Pt infrequemtly uses DME for ambulation and prefers to furniture walk in home)    ADL Assistance: Independent, [address] Responsibilities: Yes    Ambulation Assistance: Independent, Transfer Assistance: Independent    Additional Comments: Son stops over 2 times daily           Subjective/HPI no acute events overnight. No cp abd mild tender. No fever. Diuresed 2.4L. Echo being done. EKG:  brief NSVT         Review of Systems:   Review of Systems   Constitutional: Negative. Negative for diaphoresis and fatigue. HENT: Negative. Eyes: Negative. Respiratory:  Positive for shortness of breath. Negative for cough, chest tightness, wheezing and stridor. Cardiovascular:  Positive for leg swelling. Negative for chest pain and palpitations. Gastrointestinal: Negative. Negative for blood in stool and nausea. Genitourinary: Negative. Musculoskeletal: Negative. Skin: Negative. Neurological:  Positive for weakness. Negative for dizziness, syncope and light-headedness. Hematological: Negative. Psychiatric/Behavioral: Negative. Physical Examination:    BP (!) 162/94   Pulse (!) 42   Temp 99.5 °F (37.5 °C) (Oral)   Resp 18   Ht 4' 11\" (1.499 m)   Wt 165 lb 3.2 oz (74.9 kg)   SpO2 97%   BMI 33.37 kg/m²    Physical Exam   Constitutional: No distress. She appears chronically ill and acutely ill.    HENT:   Normal cephalic and Atraumatic   Eyes: Pupils are equal, round, and reactive to light.   Neck: Thyroid normal. No JVD present. No neck adenopathy. No thyromegaly present. Cardiovascular: Normal rate, regular rhythm, intact distal pulses and normal pulses. Murmur heard. Pulmonary/Chest: Effort normal and breath sounds normal. She has no wheezes. She has no rales. She exhibits no tenderness. Abdominal: Soft. Bowel sounds are normal. There is no abdominal tenderness. Musculoskeletal:         General: No tenderness or edema. Normal range of motion. Cervical back: Normal range of motion and neck supple. Neurological: She is alert and oriented to person, place, and time. Skin: Skin is warm. No cyanosis. Nails show no clubbing.      LABS:  CBC:   Lab Results   Component Value Date/Time    WBC 5.7 11/12/2022 04:55 AM    RBC 4.01 11/12/2022 04:55 AM    HGB 10.9 11/12/2022 04:55 AM    HCT 33.1 11/12/2022 04:55 AM    MCV 82.6 11/12/2022 04:55 AM    MCH 27.2 11/12/2022 04:55 AM    MCHC 32.9 11/12/2022 04:55 AM    RDW 17.5 11/12/2022 04:55 AM     11/12/2022 04:55 AM     CBC with Differential:    Lab Results   Component Value Date/Time    WBC 5.7 11/12/2022 04:55 AM    RBC 4.01 11/12/2022 04:55 AM    HGB 10.9 11/12/2022 04:55 AM    HCT 33.1 11/12/2022 04:55 AM     11/12/2022 04:55 AM    MCV 82.6 11/12/2022 04:55 AM    MCH 27.2 11/12/2022 04:55 AM    MCHC 32.9 11/12/2022 04:55 AM    RDW 17.5 11/12/2022 04:55 AM    BANDSPCT 2 07/24/2022 05:00 AM    METASPCT 2 07/24/2022 05:00 AM    LYMPHOPCT 11.2 11/12/2022 04:55 AM    MONOPCT 12.0 11/12/2022 04:55 AM    BASOPCT 1.1 11/12/2022 04:55 AM    MONOSABS 0.7 11/12/2022 04:55 AM    LYMPHSABS 0.6 11/12/2022 04:55 AM    EOSABS 0.1 11/12/2022 04:55 AM    BASOSABS 0.1 11/12/2022 04:55 AM     CMP:    Lab Results   Component Value Date/Time     11/12/2022 04:55 AM    K 3.4 11/12/2022 04:55 AM    K 3.2 07/24/2022 05:00 AM     11/12/2022 04:55 AM    CO2 33 11/12/2022 04:55 AM    BUN 14 11/12/2022 04:55 AM    CREATININE 0.70 11/12/2022 04:55 AM    GFRAA >60.0 10/11/2022 10:30 AM    LABGLOM >60.0 11/12/2022 04:55 AM    GLUCOSE 111 11/12/2022 04:55 AM    PROT 6.4 11/11/2022 12:30 PM    LABALBU 3.0 11/11/2022 12:30 PM    CALCIUM 8.7 11/12/2022 04:55 AM    BILITOT 0.5 11/11/2022 12:30 PM    ALKPHOS 61 11/11/2022 12:30 PM    AST 49 11/11/2022 12:30 PM    ALT 44 11/11/2022 12:30 PM     BMP:    Lab Results   Component Value Date/Time     11/12/2022 04:55 AM    K 3.4 11/12/2022 04:55 AM    K 3.2 07/24/2022 05:00 AM     11/12/2022 04:55 AM    CO2 33 11/12/2022 04:55 AM    BUN 14 11/12/2022 04:55 AM    LABALBU 3.0 11/11/2022 12:30 PM    CREATININE 0.70 11/12/2022 04:55 AM    CALCIUM 8.7 11/12/2022 04:55 AM    GFRAA >60.0 10/11/2022 10:30 AM    LABGLOM >60.0 11/12/2022 04:55 AM    GLUCOSE 111 11/12/2022 04:55 AM     Magnesium:    Lab Results   Component Value Date/Time    MG 2.1 08/03/2022 05:18 AM     Troponin:    Lab Results   Component Value Date/Time    TROPONINI <0.010 11/11/2022 12:30 PM        Active Hospital Problems    Diagnosis Date Noted    Acute diastolic HF (heart failure) ([facility] [I50.31] 11/11/2022     Priority: Medium    CHF (congestive heart failure), NYHA class I, acute on chronic, combined ([facility] [I50.43] 11/11/2022     Priority: Medium        Assessment/Plan:  Acute on chronic DHF- continue iv Lasix. Advance K to 20 bid. Follow labs  Will review Echo  Keep on Telemetry  AF- Xarelto. Rate control added DIg  Ascending AO Aneurysm 5.3 cm - no plans for surgery per pt and daughter. She has Mural Thrombus. - on Xarelto  HTN not to goal - add ARB  Abd Abscess and discomfort - on iv ABX. CAD Moderate LAD - no CP reported.    Elevated D Dimer- CT Chest - negative PE       Electronically signed by Suly Edgar MD on 11/12/2022 at 8:35 AM

## 2022-11-13 LAB
ANION GAP SERPL CALCULATED.3IONS-SCNC: 6 MEQ/L (ref 9–15)
BASOPHILS ABSOLUTE: 0.1 K/UL (ref 0–0.2)
BASOPHILS RELATIVE PERCENT: 0.8 %
BUN BLDV-MCNC: 11 MG/DL (ref 8–23)
CALCIUM SERPL-MCNC: 8.7 MG/DL (ref 8.5–9.9)
CHLORIDE BLD-SCNC: 100 MEQ/L (ref 95–107)
CO2: 36 MEQ/L (ref 20–31)
CREAT SERPL-MCNC: 0.51 MG/DL (ref 0.5–0.9)
EOSINOPHILS ABSOLUTE: 0.2 K/UL (ref 0–0.7)
EOSINOPHILS RELATIVE PERCENT: 3.6 %
GFR SERPL CREATININE-BSD FRML MDRD: >60 ML/MIN/{1.73_M2}
GLUCOSE BLD-MCNC: 109 MG/DL (ref 70–99)
HCT VFR BLD CALC: 33.6 % (ref 37–47)
HEMOGLOBIN: 11 G/DL (ref 12–16)
LYMPHOCYTES ABSOLUTE: 0.6 K/UL (ref 1–4.8)
LYMPHOCYTES RELATIVE PERCENT: 9.6 %
MAGNESIUM: 1.6 MG/DL (ref 1.7–2.4)
MCH RBC QN AUTO: 27.1 PG (ref 27–31.3)
MCHC RBC AUTO-ENTMCNC: 32.9 % (ref 33–37)
MCV RBC AUTO: 82.5 FL (ref 79.4–94.8)
MONOCYTES ABSOLUTE: 0.8 K/UL (ref 0.2–0.8)
MONOCYTES RELATIVE PERCENT: 12.4 %
NEUTROPHILS ABSOLUTE: 4.7 K/UL (ref 1.4–6.5)
NEUTROPHILS RELATIVE PERCENT: 73.6 %
PDW BLD-RTO: 17.5 % (ref 11.5–14.5)
PLATELET # BLD: 160 K/UL (ref 130–400)
POTASSIUM SERPL-SCNC: 3.2 MEQ/L (ref 3.4–4.9)
RBC # BLD: 4.07 M/UL (ref 4.2–5.4)
SODIUM BLD-SCNC: 142 MEQ/L (ref 135–144)
WBC # BLD: 6.4 K/UL (ref 4.8–10.8)

## 2022-11-13 PROCEDURE — 6370000000 HC RX 637 (ALT 250 FOR IP): Performed by: INTERNAL MEDICINE

## 2022-11-13 PROCEDURE — 2700000000 HC OXYGEN THERAPY PER DAY

## 2022-11-13 PROCEDURE — 2060000000 HC ICU INTERMEDIATE R&B

## 2022-11-13 PROCEDURE — 83735 ASSAY OF MAGNESIUM: CPT

## 2022-11-13 PROCEDURE — 6360000002 HC RX W HCPCS: Performed by: INTERNAL MEDICINE

## 2022-11-13 PROCEDURE — 80048 BASIC METABOLIC PNL TOTAL CA: CPT

## 2022-11-13 PROCEDURE — 85025 COMPLETE CBC W/AUTO DIFF WBC: CPT

## 2022-11-13 PROCEDURE — 99233 SBSQ HOSP IP/OBS HIGH 50: CPT | Performed by: INTERNAL MEDICINE

## 2022-11-13 PROCEDURE — 2580000003 HC RX 258: Performed by: INTERNAL MEDICINE

## 2022-11-13 RX ORDER — MECOBALAMIN 5000 MCG
5 TABLET,DISINTEGRATING ORAL NIGHTLY
Status: CANCELLED | OUTPATIENT
Start: 2022-11-13

## 2022-11-13 RX ORDER — MAGNESIUM SULFATE IN WATER 40 MG/ML
2000 INJECTION, SOLUTION INTRAVENOUS ONCE
Status: COMPLETED | OUTPATIENT
Start: 2022-11-13 | End: 2022-11-13

## 2022-11-13 RX ORDER — FUROSEMIDE 10 MG/ML
40 INJECTION INTRAMUSCULAR; INTRAVENOUS 2 TIMES DAILY
Status: DISCONTINUED | OUTPATIENT
Start: 2022-11-13 | End: 2022-11-15

## 2022-11-13 RX ADMIN — LACTOBACILLUS TAB 4 TABLET: TAB at 10:03

## 2022-11-13 RX ADMIN — PIPERACILLIN AND TAZOBACTAM 3375 MG: 3; .375 INJECTION, POWDER, FOR SOLUTION INTRAVENOUS at 10:03

## 2022-11-13 RX ADMIN — Medication 2000 UNITS: at 10:21

## 2022-11-13 RX ADMIN — LEVOTHYROXINE SODIUM 88 MCG: 88 TABLET ORAL at 10:21

## 2022-11-13 RX ADMIN — PIPERACILLIN AND TAZOBACTAM 3375 MG: 3; .375 INJECTION, POWDER, FOR SOLUTION INTRAVENOUS at 17:27

## 2022-11-13 RX ADMIN — FUROSEMIDE 20 MG: 10 INJECTION, SOLUTION INTRAMUSCULAR; INTRAVENOUS at 10:06

## 2022-11-13 RX ADMIN — FUROSEMIDE 40 MG: 10 INJECTION, SOLUTION INTRAMUSCULAR; INTRAVENOUS at 17:26

## 2022-11-13 RX ADMIN — LACTOBACILLUS TAB 4 TABLET: TAB at 13:44

## 2022-11-13 RX ADMIN — FERROUS SULFATE TAB 325 MG (65 MG ELEMENTAL FE) 325 MG: 325 (65 FE) TAB at 17:26

## 2022-11-13 RX ADMIN — POTASSIUM CHLORIDE 20 MEQ: 1500 TABLET, EXTENDED RELEASE ORAL at 10:03

## 2022-11-13 RX ADMIN — SACUBITRIL AND VALSARTAN 1 TABLET: 24; 26 TABLET, FILM COATED ORAL at 10:22

## 2022-11-13 RX ADMIN — SACUBITRIL AND VALSARTAN 1 TABLET: 24; 26 TABLET, FILM COATED ORAL at 20:28

## 2022-11-13 RX ADMIN — MAGNESIUM SULFATE HEPTAHYDRATE 2000 MG: 40 INJECTION, SOLUTION INTRAVENOUS at 10:02

## 2022-11-13 RX ADMIN — PANTOPRAZOLE SODIUM 40 MG: 40 TABLET, DELAYED RELEASE ORAL at 10:03

## 2022-11-13 RX ADMIN — CARVEDILOL 6.25 MG: 6.25 TABLET, FILM COATED ORAL at 10:08

## 2022-11-13 RX ADMIN — RIVAROXABAN 15 MG: 15 TABLET, FILM COATED ORAL at 10:21

## 2022-11-13 RX ADMIN — CITALOPRAM HYDROBROMIDE 20 MG: 20 TABLET ORAL at 10:04

## 2022-11-13 RX ADMIN — FERROUS SULFATE TAB 325 MG (65 MG ELEMENTAL FE) 325 MG: 325 (65 FE) TAB at 10:21

## 2022-11-13 RX ADMIN — ALUMINA, MAGNESIA, AND SIMETHICONE ORAL SUSPENSION REGULAR STRENGTH: 1200; 1200; 120 SUSPENSION ORAL at 13:42

## 2022-11-13 RX ADMIN — Medication 10 ML: at 20:30

## 2022-11-13 RX ADMIN — POTASSIUM CHLORIDE 20 MEQ: 1500 TABLET, EXTENDED RELEASE ORAL at 20:28

## 2022-11-13 RX ADMIN — PIPERACILLIN AND TAZOBACTAM 3375 MG: 3; .375 INJECTION, POWDER, FOR SOLUTION INTRAVENOUS at 02:13

## 2022-11-13 RX ADMIN — LACTOBACILLUS TAB 4 TABLET: TAB at 20:28

## 2022-11-13 RX ADMIN — CARVEDILOL 6.25 MG: 6.25 TABLET, FILM COATED ORAL at 20:29

## 2022-11-13 RX ADMIN — Medication 10 ML: at 10:00

## 2022-11-13 RX ADMIN — DIGOXIN 125 MCG: 125 TABLET ORAL at 10:09

## 2022-11-13 ASSESSMENT — ENCOUNTER SYMPTOMS
CHEST TIGHTNESS: 0
COUGH: 0
ABDOMINAL PAIN: 1
NAUSEA: 0
EYES NEGATIVE: 1
STRIDOR: 0
BLOOD IN STOOL: 0
WHEEZING: 0
SHORTNESS OF BREATH: 1

## 2022-11-13 NOTE — PROGRESS NOTES
Internal Medicine   Hospitalist   Progress Note    2022   1:03 PM    Name:  Sergio Sabillon  MRN:    [id number]     IP Day: 2     Admit Date: 2022 12:04 PM  PCP: Jamila Holbrook MD    Code Status:  Full Code    Assessment and Plan: Active Problems/ diagnosis:     Acute on chronic diastolic heart failure  A. fib on Xarelto  Abdominal abscess-on IV antibiotics  Elevated D-[id number]-dmyhtgss due to infection, no PE on CTA  AAA-5.3cm, patient and family not interested in surgery at this time  HTN  CAD-no chest pain, stable    Plan  GI cocktail for stomach upset   Resume diuresis as per cardiologist  Resume as per infectious disease is following  Monitor renal function and electrolytes  Resume Xarelto, digoxin, Coreg  And discussed plan of care with patient  DVT PPx     7 pm- 7 am, please contact on call Hospitalist for any needs     Dispo- pending consultants     Subjective:      no new events. Feels well. No chest pain. Has upset stomach.      Physical Examination:      Vitals:  BP (!) 130/98   Pulse 79   Temp 98.6 °F (37 °C) (Oral)   Resp 18   Ht 4' 11\" (1.499 m)   Wt 157 lb 12.8 oz (71.6 kg)   SpO2 100%   BMI 31.87 kg/m²   Temp (24hrs), Av.2 °F (36.8 °C), Min:97.3 °F (36.3 °C), Max:98.6 °F (37 °C)      General appearance: alert, cooperative and no distress  Mental Status: oriented to person, place and time and normal affect  Lungs: clear to auscultation bilaterally, normal effort  Heart: regular rate   Abdomen: soft, mild epigastric TTP, nondistended, bowel sounds present, no masses  Extremities: + leg edema, no redness, tenderness in the calves  Skin: no gross lesions, rashes    Data:     Labs:  Recent Labs     22  0455 22  0545   WBC 5.7 6.4   HGB 10.9* 11.0*    160     Recent Labs     225 22  0547    142   K 3.4 3.2*    100   CO2 33* 36*   BUN 14 11   CREATININE 0.70 0.51   GLUCOSE 111* 109*     Recent Labs     22  1230   AST 49* ALT 44*   BILITOT 0.5   ALKPHOS 61       Current Facility-Administered Medications   Medication Dose Route Frequency Provider Last Rate Last Admin    furosemide (LASIX) injection 40 mg  40 mg IntraVENous BID Blair Cason MD        sacubitril-valsartan (ENTRESTO) 24-26 MG per tablet 1 tablet  1 tablet Oral BID Blair Cason MD   1 tablet at 11/13/22 1022    aluminum & magnesium hydroxide-simethicone (MAALOX) 30 mL, lidocaine viscous hcl (XYLOCAINE) 5 mL (GI COCKTAIL)   Oral Once Ciara Hernandez,         potassium chloride (KLOR-CON M) extended release tablet 20 mEq  20 mEq Oral BID Blair Cason MD   20 mEq at 11/13/22 1003    sodium chloride flush 0.9 % injection 5-40 mL  5-40 mL IntraVENous 2 times per day Kanchan Tyson MD   10 mL at 11/12/22 2044    sodium chloride flush 0.9 % injection 5-40 mL  5-40 mL IntraVENous PRN Kanchan Tyson MD        0.9 % sodium chloride infusion   IntraVENous PRN Kanchan Tyson MD        ondansetron (ZOFRAN-ODT) disintegrating tablet 4 mg  4 mg Oral Q8H PRN Kanchan Tyson MD        Or    ondansetron (ZOFRAN) injection 4 mg  4 mg IntraVENous Q6H PRN Kanchan Tyson MD        polyethylene glycol (GLYCOLAX) packet 17 g  17 g Oral Daily PRN Kanchan Tyson MD        acetaminophen (TYLENOL) tablet 650 mg  650 mg Oral Q6H PRN Kanchan Tyson MD   650 mg at 11/12/22 1546    Or    acetaminophen (TYLENOL) suppository 650 mg  650 mg Rectal Q6H PRN Kanchan Tyson MD        digoxin (LANOXIN) tablet 125 mcg  125 mcg Oral Daily Blair Cason MD   125 mcg at 11/13/22 1009    vitamin D (CHOLECALCIFEROL) tablet 2,000 Units  2,000 Units Oral Dinner Kanchan Tyson MD   2,000 Units at 11/13/22 1021    rivaroxaban (XARELTO) tablet 15 mg  15 mg Oral Daily with breakfast Kanchan Tyson MD   15 mg at 11/13/22 1021    levothyroxine (SYNTHROID) tablet 88 mcg  88 mcg Oral Daily Kanchan Tyson MD   88 mcg at 11/13/22 1021    ferrous sulfate (IRON 325) tablet 325 mg  325 mg Oral BID  Kanchan Tyson MD   325 mg at 11/13/22 1021    carvedilol (COREG) tablet 6.25 mg  6.25 mg Oral BID Rob Escalera MD   6.25 mg at 11/13/22 1008    citalopram (CELEXA) tablet 20 mg  20 mg Oral Daily Rob Escalera MD   20 mg at 11/13/22 1004    pantoprazole (PROTONIX) tablet 40 mg  40 mg Oral Daily Rob Escalera MD   40 mg at 11/13/22 1003    lactobacillus acidophilus [facility]) 4 tablet  4 tablet Oral TID Rob Escalera MD   4 tablet at 11/13/22 1003    piperacillin-tazobactam (ZOSYN) 4,500 mg in dextrose 5 % 100 mL IVPB (mini-bag)  4,500 mg IntraVENous Once Rob Escalera MD        Followed by    piperacillin-tazobactam (ZOSYN) 3,375 mg in dextrose 5 % 50 mL IVPB (mini-bag)  3,375 mg IntraVENous Q8H Rob Escalera MD 12.5 mL/hr at 11/13/22 1003 3,375 mg at 11/13/22 1003       Additional work up or/and treatment plan may be added today or then after based on clinical progression. I am managing a portion of pt care. Some medical issues are handled by other specialists. Additional work up and treatment should be done in out pt setting by pt PCP and other out pt providers. In addition to examining and evaluating pt, I spent additional time explaining care, normaland abnormal findings, and treatment plan. All of pt questions were answered. Counseling, diet and education were provided. Case will be discussed with nursing staff when appropriate. Family will be updated if and when appropriate.        Electronically signed by Radha Babcock DO on 11/13/2022 at 1:03 PM

## 2022-11-13 NOTE — PROGRESS NOTES
Progress Note  Patient: Sincere Hall  Unit/Bed: W650/S264-96  YOB: 1932  MRN: [id number]  Acct: [de-identified]   Admitting Diagnosis: Acute diastolic HF (heart failure) (HCC) [I50.31]  CHF (congestive heart failure), NYHA class I, acute on chronic, combined ([address] [I50.43]  Dyspnea, unspecified type [Z22.48]  Systolic congestive heart failure, unspecified HF chronicity ([address] [I50.20]  Admit Date:  11/11/2022  Hospital Day: 2    Chief Complaint: CHF    Histories:  Past Medical History:   Diagnosis Date    Ascending aortic aneurysm 6/23/2017    Charcot Arleen Tooth muscular atrophy     CHF (congestive heart failure) ([facility])     Chronic diastolic CHF (congestive heart failure) ([address] 4/1/2022    Depression     Descending aortic aneurysm ([facility] 6/23/2017    Essential hypertension 2/16/2018    Headache     HTN (hypertension)     Impaired mobility and activities of daily living     Lumbar stenosis with neurogenic claudication     Myelopathy (HCC)     Osteoarthritis      Past Surgical History:   Procedure Laterality Date    IR INS PICC VAD W SQ PORT GREATER THAN 5  9/23/2022    IR INS PICC VAD W SQ PORT GREATER THAN 5 9/23/2022 [facility] SPECIAL PROCEDURE    JOINT REPLACEMENT Bilateral     knees    OTHER SURGICAL HISTORY Right 07/21/2022    cat scan guided abdominal mass aspiration with drain placement by Dr. Rm Luna Left 02/25/2021    LEFT PLEURAL CATHETER INSERTION performed by Veronica Enriquez MD at [place] [id number] Left 01/29/2021    total of 755 cc removed per Dr Vani Murguia specimen sent to lab     Family History   Problem Relation Age of Onset    Arthritis Mother     Arthritis Father     High Blood Pressure Father      Social History     Socioeconomic History    Marital status:      Number of children: 2   Tobacco Use    Smoking status: Never    Smokeless tobacco: Never   Vaping Use    Vaping Use: Never used   Substance and Sexual Activity    Alcohol use: No     Alcohol/week: 0.0 standard drinks    Drug use: No    Sexual activity: Not Currently   Social History Narrative    , Lives With: Alone, son lives down the street, dtr is in the area    Type of Home: [address]: One level    Home Access: Stairs to enter with rails- Number of Steps: 2- Rails: Both    Bathroom Shower/Tub: Tub/Shower unit, Bathroom Equipment: Grab bars in shower, Shower chair    Home Equipment: Rolling walker, Cane(Pt infrequemtly uses DME for ambulation and prefers to furniture walk in home)    ADL Assistance: Independent, [facility] Responsibilities: Yes    Ambulation Assistance: Independent, Transfer Assistance: Independent    Additional Comments: Son stops over 2 times daily           Subjective/HPI no acute events overnight. No cp abd mild tender. No fever. Diuresed well. No I/Os recorded. More Abd pain this am. Son in room. Still feels SOB. EKG:SR 80 PVC        Review of Systems:   Review of Systems   Constitutional: Negative. Negative for diaphoresis and fatigue. HENT: Negative. Eyes: Negative. Respiratory:  Positive for shortness of breath. Negative for cough, chest tightness, wheezing and stridor. Cardiovascular:  Positive for leg swelling. Negative for chest pain and palpitations. Gastrointestinal:  Positive for abdominal pain. Negative for blood in stool and nausea. Genitourinary: Negative. Musculoskeletal: Negative. Skin: Negative. Neurological:  Positive for weakness. Negative for dizziness, syncope and light-headedness. Hematological: Negative. Psychiatric/Behavioral: Negative. Physical Examination:    BP (!) 130/98   Pulse 79   Temp 98.6 °F (37 °C) (Oral)   Resp 18   Ht 4' 11\" (1.499 m)   Wt 157 lb 12.8 oz (71.6 kg)   SpO2 100%   BMI 31.87 kg/m²    Physical Exam   Constitutional: No distress. She appears chronically ill and acutely ill.    HENT:   Normal cephalic and Atraumatic   Eyes: Pupils are equal, round, and reactive to light. Neck: Thyroid normal. No JVD present. No neck adenopathy. No thyromegaly present. Cardiovascular: Normal rate, regular rhythm, intact distal pulses and normal pulses. Murmur heard. Pulmonary/Chest: Effort normal and breath sounds normal. She has no wheezes. She has no rales. She exhibits no tenderness. Abdominal: Soft. Bowel sounds are normal. There is no abdominal tenderness. Musculoskeletal:         General: No tenderness or edema. Normal range of motion. Cervical back: Normal range of motion and neck supple. Neurological: She is alert and oriented to person, place, and time. Skin: Skin is warm. No cyanosis. Nails show no clubbing.      LABS:  CBC:   Lab Results   Component Value Date/Time    WBC 6.4 11/13/2022 05:45 AM    RBC 4.07 11/13/2022 05:45 AM    HGB 11.0 11/13/2022 05:45 AM    HCT 33.6 11/13/2022 05:45 AM    MCV 82.5 11/13/2022 05:45 AM    MCH 27.1 11/13/2022 05:45 AM    MCHC 32.9 11/13/2022 05:45 AM    RDW 17.5 11/13/2022 05:45 AM     11/13/2022 05:45 AM     CBC with Differential:    Lab Results   Component Value Date/Time    WBC 6.4 11/13/2022 05:45 AM    RBC 4.07 11/13/2022 05:45 AM    HGB 11.0 11/13/2022 05:45 AM    HCT 33.6 11/13/2022 05:45 AM     11/13/2022 05:45 AM    MCV 82.5 11/13/2022 05:45 AM    MCH 27.1 11/13/2022 05:45 AM    MCHC 32.9 11/13/2022 05:45 AM    RDW 17.5 11/13/2022 05:45 AM    BANDSPCT 2 07/24/2022 05:00 AM    METASPCT 2 07/24/2022 05:00 AM    LYMPHOPCT 9.6 11/13/2022 05:45 AM    MONOPCT 12.4 11/13/2022 05:45 AM    BASOPCT 0.8 11/13/2022 05:45 AM    MONOSABS 0.8 11/13/2022 05:45 AM    LYMPHSABS 0.6 11/13/2022 05:45 AM    EOSABS 0.2 11/13/2022 05:45 AM    BASOSABS 0.1 11/13/2022 05:45 AM     CMP:    Lab Results   Component Value Date/Time     11/13/2022 05:47 AM    K 3.2 11/13/2022 05:47 AM    K 3.2 07/24/2022 05:00 AM     11/13/2022 05:47 AM    CO2 36 11/13/2022 05:47 AM BUN 11 11/13/2022 05:47 AM    CREATININE 0.51 11/13/2022 05:47 AM    GFRAA >60.0 10/11/2022 10:30 AM    LABGLOM >60.0 11/13/2022 05:47 AM    GLUCOSE 109 11/13/2022 05:47 AM    PROT 6.4 11/11/2022 12:30 PM    LABALBU 3.0 11/11/2022 12:30 PM    CALCIUM 8.7 11/13/2022 05:47 AM    BILITOT 0.5 11/11/2022 12:30 PM    ALKPHOS 61 11/11/2022 12:30 PM    AST 49 11/11/2022 12:30 PM    ALT 44 11/11/2022 12:30 PM     BMP:    Lab Results   Component Value Date/Time     11/13/2022 05:47 AM    K 3.2 11/13/2022 05:47 AM    K 3.2 07/24/2022 05:00 AM     11/13/2022 05:47 AM    CO2 36 11/13/2022 05:47 AM    BUN 11 11/13/2022 05:47 AM    LABALBU 3.0 11/11/2022 12:30 PM    CREATININE 0.51 11/13/2022 05:47 AM    CALCIUM 8.7 11/13/2022 05:47 AM    GFRAA >60.0 10/11/2022 10:30 AM    LABGLOM >60.0 11/13/2022 05:47 AM    GLUCOSE 109 11/13/2022 05:47 AM     Magnesium:    Lab Results   Component Value Date/Time    MG 1.6 11/13/2022 05:47 AM     Troponin:    Lab Results   Component Value Date/Time    TROPONINI <0.010 11/11/2022 12:30 PM        Active Hospital Problems    Diagnosis Date Noted    Acute diastolic HF (heart failure) ([facility] [I50.31] 11/11/2022     Priority: Medium    CHF (congestive heart failure), NYHA class I, acute on chronic, combined ([facility] [I50.43] 11/11/2022     Priority: Medium        Assessment/Plan:  Acute on chronic DHF- advance iv Lasix. Advance K to 40 bid. Follow labs strict I/Os  Echo EF 40 form prior 60. Likely related to AF. Add Entresto. Dc ARB  Keep on Telemetry  AF- Xarelto. Rate control added DIg  Ascending AO Aneurysm 5.3 cm - no plans for surgery per pt and daughter. She has Mural Thrombus. - on Xarelto  HTN not to goal - add ARB  Abd Abscess and discomfort - on iv ABX. Continued discomfort. CAD Moderate LAD - no CP reported.    Elevated D Dimer- CT Chest - negative PE       Electronically signed by Riana Niño MD on 11/13/2022 at 9:23 AM

## 2022-11-13 NOTE — PROGRESS NOTES
Jimmy Rodriguez  7/12/1932  female  Medical Record Number: [id number]      HPI: Patient with hx of intrabdominal abscess treated with percutaneous draiange on Invanz initially, changed to Zosyn for possible pseudomonas infection by Dr Vernon Olivares as outpatient - she came in for increased SOB. Per MR, she had been treated initially with Zosyn for weeks. She was off of abx for approx 2 weeks prior to starting the invanz, which started on 9/14/2022 x 30 days course. Dr Vernon Olivares changed the abx on 10/28/22 to Zosyn x 6 weeks course from that date. Per medical record, patient was not a surgical candidate. She states that she has some intrabdominal pain one day ago but no pain now and she \" is fine\"   Currently on O2 but denies SOB.     + hx of Afib, AAA, CAD    Physical Exam:    General: Patient appears in no acute distress, cooperative - arousable but is taking a nap  Skin: no new rashes or erythema or induration  HEENT: EOMI, MMM, Neck is supple  Heart: S1 S2  Lungs: bilaterally clear to auscultation  Abdomen: soft, ND, NTTP, +BS  Extrem:no pain  Neuro exam: CN II-XII intact, facial expressions symmertrical bilaterally, no slurred speech      Labs: I have reviewed all lab results by electronic record, including most recent CBC, metabolic panel, and pertinent abnormalities were addressed from an infectious disease perspective. WBC trends are being monitored.     Lab Results   Component Value Date/Time     11/12/2022 04:55 AM    K 3.4 11/12/2022 04:55 AM    K 3.2 07/24/2022 05:00 AM     11/12/2022 04:55 AM    CO2 33 11/12/2022 04:55 AM    BUN 14 11/12/2022 04:55 AM    CREATININE 0.70 11/12/2022 04:55 AM    GLUCOSE 111 11/12/2022 04:55 AM    CALCIUM 8.7 11/12/2022 04:55 AM      Lab Results   Component Value Date    WBC 5.7 11/12/2022    HGB 10.9 (L) 11/12/2022    HCT 33.1 (L) 11/12/2022    MCV 82.6 11/12/2022     11/12/2022       Radiology:   I have reviewed imaging results per electronic record and most pertinent abnormalities are being addressed from an infectious disease standpoint. Last CT was 10/19/22 which still showed large abscess. Culture no grown on 11/2 and 8/16  Surgical cx with strep viridans on 7/21/22  Estimated Creatinine Clearance: 50 mL/min (based on SCr of 0.7 mg/dL). Assessment:  Intraabdominal abscess - large and persistent. Now on Zosyn. Not a surgical candidate per MR    Plan:  Stop date for the abx will be December 5, 2022. Follow up that week in the ID clinic please. Weekly CBC, BMP, CRP    This was a requested consult  Above is my noted response to the concern that prompted the consult. Communication directed toward referring primary.      Avis Casas D.O.

## 2022-11-14 LAB
ANION GAP SERPL CALCULATED.3IONS-SCNC: 9 MEQ/L (ref 9–15)
BASOPHILS ABSOLUTE: 0.1 K/UL (ref 0–0.2)
BASOPHILS RELATIVE PERCENT: 0.9 %
BUN BLDV-MCNC: 8 MG/DL (ref 8–23)
CALCIUM SERPL-MCNC: 8.5 MG/DL (ref 8.5–9.9)
CHLORIDE BLD-SCNC: 96 MEQ/L (ref 95–107)
CO2: 36 MEQ/L (ref 20–31)
CREAT SERPL-MCNC: 0.46 MG/DL (ref 0.5–0.9)
EOSINOPHILS ABSOLUTE: 0.4 K/UL (ref 0–0.7)
EOSINOPHILS RELATIVE PERCENT: 5.1 %
GFR SERPL CREATININE-BSD FRML MDRD: >60 ML/MIN/{1.73_M2}
GLUCOSE BLD-MCNC: 109 MG/DL (ref 70–99)
HCT VFR BLD CALC: 37.1 % (ref 37–47)
HEMOGLOBIN: 11.8 G/DL (ref 12–16)
LYMPHOCYTES ABSOLUTE: 0.6 K/UL (ref 1–4.8)
LYMPHOCYTES RELATIVE PERCENT: 7.9 %
MAGNESIUM: 1.8 MG/DL (ref 1.7–2.4)
MCH RBC QN AUTO: 26.4 PG (ref 27–31.3)
MCHC RBC AUTO-ENTMCNC: 31.7 % (ref 33–37)
MCV RBC AUTO: 83.4 FL (ref 79.4–94.8)
MONOCYTES ABSOLUTE: 0.8 K/UL (ref 0.2–0.8)
MONOCYTES RELATIVE PERCENT: 10.8 %
NEUTROPHILS ABSOLUTE: 5.4 K/UL (ref 1.4–6.5)
NEUTROPHILS RELATIVE PERCENT: 75.3 %
PDW BLD-RTO: 17.3 % (ref 11.5–14.5)
PLATELET # BLD: 184 K/UL (ref 130–400)
POTASSIUM SERPL-SCNC: 3 MEQ/L (ref 3.4–4.9)
RBC # BLD: 4.45 M/UL (ref 4.2–5.4)
SODIUM BLD-SCNC: 141 MEQ/L (ref 135–144)
WBC # BLD: 7.2 K/UL (ref 4.8–10.8)

## 2022-11-14 PROCEDURE — 2580000003 HC RX 258: Performed by: INTERNAL MEDICINE

## 2022-11-14 PROCEDURE — 99233 SBSQ HOSP IP/OBS HIGH 50: CPT | Performed by: INTERNAL MEDICINE

## 2022-11-14 PROCEDURE — 97162 PT EVAL MOD COMPLEX 30 MIN: CPT

## 2022-11-14 PROCEDURE — 6360000002 HC RX W HCPCS: Performed by: INTERNAL MEDICINE

## 2022-11-14 PROCEDURE — 6370000000 HC RX 637 (ALT 250 FOR IP): Performed by: INTERNAL MEDICINE

## 2022-11-14 PROCEDURE — 83735 ASSAY OF MAGNESIUM: CPT

## 2022-11-14 PROCEDURE — 85025 COMPLETE CBC W/AUTO DIFF WBC: CPT

## 2022-11-14 PROCEDURE — 97166 OT EVAL MOD COMPLEX 45 MIN: CPT

## 2022-11-14 PROCEDURE — 2700000000 HC OXYGEN THERAPY PER DAY

## 2022-11-14 PROCEDURE — 80048 BASIC METABOLIC PNL TOTAL CA: CPT

## 2022-11-14 PROCEDURE — 99232 SBSQ HOSP IP/OBS MODERATE 35: CPT | Performed by: INTERNAL MEDICINE

## 2022-11-14 PROCEDURE — 2060000000 HC ICU INTERMEDIATE R&B

## 2022-11-14 RX ORDER — POTASSIUM CHLORIDE 20 MEQ/1
20 TABLET, EXTENDED RELEASE ORAL ONCE
Status: COMPLETED | OUTPATIENT
Start: 2022-11-14 | End: 2022-11-14

## 2022-11-14 RX ADMIN — PIPERACILLIN AND TAZOBACTAM 3375 MG: 3; .375 INJECTION, POWDER, FOR SOLUTION INTRAVENOUS at 18:02

## 2022-11-14 RX ADMIN — Medication 2000 UNITS: at 17:53

## 2022-11-14 RX ADMIN — RIVAROXABAN 15 MG: 15 TABLET, FILM COATED ORAL at 10:58

## 2022-11-14 RX ADMIN — CITALOPRAM HYDROBROMIDE 20 MG: 20 TABLET ORAL at 10:59

## 2022-11-14 RX ADMIN — Medication 10 ML: at 10:00

## 2022-11-14 RX ADMIN — FERROUS SULFATE TAB 325 MG (65 MG ELEMENTAL FE) 325 MG: 325 (65 FE) TAB at 10:59

## 2022-11-14 RX ADMIN — POTASSIUM CHLORIDE 20 MEQ: 1500 TABLET, EXTENDED RELEASE ORAL at 22:46

## 2022-11-14 RX ADMIN — FERROUS SULFATE TAB 325 MG (65 MG ELEMENTAL FE) 325 MG: 325 (65 FE) TAB at 17:53

## 2022-11-14 RX ADMIN — SACUBITRIL AND VALSARTAN 1 TABLET: 24; 26 TABLET, FILM COATED ORAL at 22:45

## 2022-11-14 RX ADMIN — LACTOBACILLUS TAB 4 TABLET: TAB at 22:46

## 2022-11-14 RX ADMIN — Medication 10 ML: at 22:46

## 2022-11-14 RX ADMIN — CARVEDILOL 6.25 MG: 6.25 TABLET, FILM COATED ORAL at 10:58

## 2022-11-14 RX ADMIN — POTASSIUM CHLORIDE 20 MEQ: 1500 TABLET, EXTENDED RELEASE ORAL at 11:00

## 2022-11-14 RX ADMIN — PANTOPRAZOLE SODIUM 40 MG: 40 TABLET, DELAYED RELEASE ORAL at 10:59

## 2022-11-14 RX ADMIN — POTASSIUM CHLORIDE 20 MEQ: 1500 TABLET, EXTENDED RELEASE ORAL at 10:57

## 2022-11-14 RX ADMIN — LACTOBACILLUS TAB 4 TABLET: TAB at 10:54

## 2022-11-14 RX ADMIN — PIPERACILLIN AND TAZOBACTAM 3375 MG: 3; .375 INJECTION, POWDER, FOR SOLUTION INTRAVENOUS at 11:07

## 2022-11-14 RX ADMIN — CARVEDILOL 6.25 MG: 6.25 TABLET, FILM COATED ORAL at 22:45

## 2022-11-14 RX ADMIN — FUROSEMIDE 40 MG: 10 INJECTION, SOLUTION INTRAMUSCULAR; INTRAVENOUS at 10:53

## 2022-11-14 RX ADMIN — ACETAMINOPHEN 650 MG: 325 TABLET ORAL at 10:57

## 2022-11-14 RX ADMIN — LACTOBACILLUS TAB 4 TABLET: TAB at 13:44

## 2022-11-14 RX ADMIN — LEVOTHYROXINE SODIUM 88 MCG: 88 TABLET ORAL at 11:09

## 2022-11-14 RX ADMIN — FUROSEMIDE 40 MG: 10 INJECTION, SOLUTION INTRAMUSCULAR; INTRAVENOUS at 17:53

## 2022-11-14 RX ADMIN — PIPERACILLIN AND TAZOBACTAM 3375 MG: 3; .375 INJECTION, POWDER, FOR SOLUTION INTRAVENOUS at 02:21

## 2022-11-14 RX ADMIN — SACUBITRIL AND VALSARTAN 1 TABLET: 24; 26 TABLET, FILM COATED ORAL at 10:53

## 2022-11-14 RX ADMIN — DIGOXIN 125 MCG: 125 TABLET ORAL at 10:58

## 2022-11-14 ASSESSMENT — ENCOUNTER SYMPTOMS
CHEST TIGHTNESS: 0
ABDOMINAL PAIN: 1
DIARRHEA: 0
SHORTNESS OF BREATH: 0
WHEEZING: 0
NAUSEA: 0
COUGH: 0
STRIDOR: 0
BLOOD IN STOOL: 0
EYES NEGATIVE: 1
VOMITING: 0

## 2022-11-14 ASSESSMENT — PAIN SCALES - GENERAL: PAINLEVEL_OUTOF10: 2

## 2022-11-14 ASSESSMENT — PAIN DESCRIPTION - LOCATION: LOCATION: HEAD

## 2022-11-14 NOTE — PROGRESS NOTES
Progress Note  Patient: Tonny Frazier  Unit/Bed: E530/N238-05  YOB: 1932  MRN: [id number]  Acct: [de-identified]   Admitting Diagnosis: Acute diastolic HF (heart failure) (HCC) [I50.31]  CHF (congestive heart failure), NYHA class I, acute on chronic, combined ([facility] [I50.43]  Dyspnea, unspecified type [L65.80]  Systolic congestive heart failure, unspecified HF chronicity ([address] [I50.20]  Admit Date:  11/11/2022  Hospital Day: 3    Chief Complaint: CHF    Histories:  Past Medical History:   Diagnosis Date    Ascending aortic aneurysm 6/23/2017    Charcot Arleen Tooth muscular atrophy     CHF (congestive heart failure) (HCC)     Chronic diastolic CHF (congestive heart failure) ([address] 4/1/2022    Depression     Descending aortic aneurysm ([facility] 6/23/2017    Essential hypertension 2/16/2018    Headache     HTN (hypertension)     Impaired mobility and activities of daily living     Lumbar stenosis with neurogenic claudication     Myelopathy (HCC)     Osteoarthritis      Past Surgical History:   Procedure Laterality Date    IR INS PICC VAD W SQ PORT GREATER THAN 5  9/23/2022    IR INS PICC VAD W SQ PORT GREATER THAN 5 9/23/2022 [facility] SPECIAL PROCEDURE    JOINT REPLACEMENT Bilateral     knees    OTHER SURGICAL HISTORY Right 07/21/2022    cat scan guided abdominal mass aspiration with drain placement by Dr. Solomon Degree Left 02/25/2021    LEFT PLEURAL CATHETER INSERTION performed by Cruz Adames MD at [place] [address] Left 01/29/2021    total of 755 cc removed per Dr Nakul Tyler specimen sent to lab     Family History   Problem Relation Age of Onset    Arthritis Mother     Arthritis Father     High Blood Pressure Father      Social History     Socioeconomic History    Marital status:      Number of children: 2   Tobacco Use    Smoking status: Never    Smokeless tobacco: Never   Vaping Use    Vaping Use: Never used   Substance and Sexual Activity    Alcohol use: No     Alcohol/week: 0.0 standard drinks    Drug use: No    Sexual activity: Not Currently   Social History Narrative    , Lives With: Alone, son lives down the street, dtr is in the area    [place] [facility]  [place] [address]: One level    Home Access: Stairs to enter with rails- Number of Steps: 2- Rails: Both    Bathroom Shower/Tub: Tub/Shower unit, Bathroom Equipment: Grab bars in shower, Shower chair    Home Equipment: Rolling walker, Cane(Pt infrequemtly uses DME for ambulation and prefers to furniture walk in home)    ADL Assistance: Independent, [facility] Responsibilities: Yes    Ambulation Assistance: Independent, Transfer Assistance: Independent    Additional Comments: Son stops over 2 times daily           Subjective/HPI no acute events overnight. No cp abd mild tender- no change. No fever. Diuresed well. No I/Os recorded. EKG:SR 79        Review of Systems:   Review of Systems   Constitutional: Negative. Negative for diaphoresis and fatigue. HENT: Negative. Eyes: Negative. Respiratory:  Negative for cough, chest tightness, wheezing and stridor. Cardiovascular:  Negative for chest pain and palpitations. Gastrointestinal:  Positive for abdominal pain. Negative for blood in stool and nausea. Genitourinary: Negative. Musculoskeletal: Negative. Skin: Negative. Neurological:  Positive for weakness. Negative for dizziness, syncope and light-headedness. Hematological: Negative. Psychiatric/Behavioral: Negative. Physical Examination:    /78   Pulse 80   Temp 97.7 °F (36.5 °C) (Oral)   Resp 18   Ht 4' 11\" (1.499 m)   Wt 151 lb 1.6 oz (68.5 kg)   SpO2 93%   BMI 30.52 kg/m²    Physical Exam   Constitutional: No distress. She appears chronically ill and acutely ill. HENT:   Normal cephalic and Atraumatic   Eyes: Pupils are equal, round, and reactive to light. Neck: Thyroid normal. No JVD present.  No neck adenopathy. No thyromegaly present. Cardiovascular: Normal rate, regular rhythm, intact distal pulses and normal pulses. Murmur heard. Pulmonary/Chest: Effort normal and breath sounds normal. She has no wheezes. She has no rales. She exhibits no tenderness. Abdominal: Soft. Bowel sounds are normal. There is no abdominal tenderness. Musculoskeletal:         General: No tenderness or edema. Normal range of motion. Cervical back: Normal range of motion and neck supple. Neurological: She is alert and oriented to person, place, and time. Skin: Skin is warm. No cyanosis. Nails show no clubbing.      LABS:  CBC:   Lab Results   Component Value Date/Time    WBC 7.2 11/14/2022 06:00 AM    RBC 4.45 11/14/2022 06:00 AM    HGB 11.8 11/14/2022 06:00 AM    HCT 37.1 11/14/2022 06:00 AM    MCV 83.4 11/14/2022 06:00 AM    MCH 26.4 11/14/2022 06:00 AM    MCHC 31.7 11/14/2022 06:00 AM    RDW 17.3 11/14/2022 06:00 AM     11/14/2022 06:00 AM     CBC with Differential:    Lab Results   Component Value Date/Time    WBC 7.2 11/14/2022 06:00 AM    RBC 4.45 11/14/2022 06:00 AM    HGB 11.8 11/14/2022 06:00 AM    HCT 37.1 11/14/2022 06:00 AM     11/14/2022 06:00 AM    MCV 83.4 11/14/2022 06:00 AM    MCH 26.4 11/14/2022 06:00 AM    MCHC 31.7 11/14/2022 06:00 AM    RDW 17.3 11/14/2022 06:00 AM    BANDSPCT 2 07/24/2022 05:00 AM    METASPCT 2 07/24/2022 05:00 AM    LYMPHOPCT 7.9 11/14/2022 06:00 AM    MONOPCT 10.8 11/14/2022 06:00 AM    BASOPCT 0.9 11/14/2022 06:00 AM    MONOSABS 0.8 11/14/2022 06:00 AM    LYMPHSABS 0.6 11/14/2022 06:00 AM    EOSABS 0.4 11/14/2022 06:00 AM    BASOSABS 0.1 11/14/2022 06:00 AM     CMP:    Lab Results   Component Value Date/Time     11/14/2022 06:00 AM    K 3.0 11/14/2022 06:00 AM    K 3.2 07/24/2022 05:00 AM    CL 96 11/14/2022 06:00 AM    CO2 36 11/14/2022 06:00 AM    BUN 8 11/14/2022 06:00 AM    CREATININE 0.46 11/14/2022 06:00 AM    GFRAA >60.0 10/11/2022 10:30 AM    LABGLOM >60.0 11/14/2022 06:00 AM    GLUCOSE 109 11/14/2022 06:00 AM    PROT 6.4 11/11/2022 12:30 PM    LABALBU 3.0 11/11/2022 12:30 PM    CALCIUM 8.5 11/14/2022 06:00 AM    BILITOT 0.5 11/11/2022 12:30 PM    ALKPHOS 61 11/11/2022 12:30 PM    AST 49 11/11/2022 12:30 PM    ALT 44 11/11/2022 12:30 PM     BMP:    Lab Results   Component Value Date/Time     11/14/2022 06:00 AM    K 3.0 11/14/2022 06:00 AM    K 3.2 07/24/2022 05:00 AM    CL 96 11/14/2022 06:00 AM    CO2 36 11/14/2022 06:00 AM    BUN 8 11/14/2022 06:00 AM    LABALBU 3.0 11/11/2022 12:30 PM    CREATININE 0.46 11/14/2022 06:00 AM    CALCIUM 8.5 11/14/2022 06:00 AM    GFRAA >60.0 10/11/2022 10:30 AM    LABGLOM >60.0 11/14/2022 06:00 AM    GLUCOSE 109 11/14/2022 06:00 AM     Magnesium:    Lab Results   Component Value Date/Time    MG 1.8 11/14/2022 06:00 AM     Troponin:    Lab Results   Component Value Date/Time    TROPONINI <0.010 11/11/2022 12:30 PM        Active Hospital Problems    Diagnosis Date Noted    Acute diastolic HF (heart failure) ([facility] [I50.31] 11/11/2022     Priority: Medium    CHF (congestive heart failure), NYHA class I, acute on chronic, combined ([facility] [I50.43] 11/11/2022     Priority: Medium        Assessment/Plan:  Acute on chronic DHF- continue iv Lasix. Advance K  Follow labs strict I/Os K 3.0 this am. Need strict I/Os. Echo EF 40 from prior 60. Likely related to AF. Add Entresto. Dc ARB  Keep on Telemetry  AF- Xarelto. Rate control added DIg  Ascending AO Aneurysm 5.3 cm - no plans for surgery per pt and daughter. She has Mural Thrombus. - on Xarelto  HTN not to goal - add ARB  Abd Abscess and discomfort - on iv ABX. Continued discomfort. CAD Moderate LAD - no CP reported.    Elevated D Dimer- CT Chest - negative PE  PTOT Rehab eval       Electronically signed by Suly Edgar MD on 11/14/2022 at 7:42 AM

## 2022-11-14 NOTE — PROGRESS NOTES
Progress Note  Date:2022       Room:[place] [id number]  Patient Name:Darryl Rodriguez     YOB: 1932     Age:89 y.o. Subjective    Subjective:  Symptoms:  She reports malaise and weakness. No shortness of breath, cough, chest pain, headache, chest pressure, anorexia, diarrhea or anxiety. Diet:  No nausea or vomiting. Review of Systems   Respiratory:  Negative for cough and shortness of breath. Cardiovascular:  Negative for chest pain. Gastrointestinal:  Negative for anorexia, diarrhea, nausea and vomiting. Neurological:  Positive for weakness. Objective         Vitals Last 24 Hours:  TEMPERATURE:  Temp  Av.8 °F (36.6 °C)  Min: 97.5 °F (36.4 °C)  Max: 98.2 °F (36.8 °C)  RESPIRATIONS RANGE: Resp  Av  Min: 18  Max: 18  PULSE OXIMETRY RANGE: SpO2  Av.3 %  Min: 93 %  Max: 99 %  PULSE RANGE: Pulse  Av.5  Min: 63  Max: 80  BLOOD PRESSURE RANGE: Systolic (39BHN), RMM:732 , Min:118 , QDN:582   ; Diastolic (05UOF), YGF:04, Min:78, Max:98    I/O (24Hr): Intake/Output Summary (Last 24 hours) at 2022 0927  Last data filed at 2022 0842  Gross per 24 hour   Intake 840 ml   Output 1150 ml   Net -310 ml     Objective:  General Appearance:  Comfortable, well-appearing and in no acute distress. Vital signs: (most recent): Blood pressure (!) 158/97, pulse 63, temperature 97.5 °F (36.4 °C), resp. rate 18, height 4' 11\" (1.499 m), weight 151 lb 1.6 oz (68.5 kg), SpO2 99 %. HEENT: Normal HEENT exam.    Lungs:  Breath sounds clear to auscultation. Heart: S1 normal and S2 normal.    Abdomen: Abdomen is soft. Bowel sounds are normal.   There is no epigastric area or suprapubic area tenderness. Pulses: Distal pulses are intact. Neurological: Patient is alert. Pupils:  Pupils are equal, round, and reactive to light. Skin:  Warm.     Labs/Imaging/Diagnostics    Labs:  CBC:  Recent Labs     22  0455 22  0545 22  0600   WBC 5.7 6.4 7. 2   RBC 4.01* 4.07* 4.45   HGB 10.9* 11.0* 11.8*   HCT 33.1* 33.6* 37.1   MCV 82.6 82.5 83.4   RDW 17.5* 17.5* 17.3*    160 184     CHEMISTRIES:  Recent Labs     11/12/22  0455 11/13/22  0547 11/14/22  0600    142 141   K 3.4 3.2* 3.0*    100 96   CO2 33* 36* 36*   BUN 14 11 8   CREATININE 0.70 0.51 0.46*   GLUCOSE 111* 109* 109*   MG  --  1.6* 1.8     PT/INR:  Recent Labs     11/11/22  1230   PROTIME 18.9*   INR 1.6     APTT:No results for input(s): APTT in the last 72 hours. LIVER PROFILE:  Recent Labs     11/11/22  1230   AST 49*   ALT 44*   BILITOT 0.5   ALKPHOS 61       Imaging Last 24 Hours:  No results found. Assessment//Plan           Hospital Problems             Last Modified POA    * (Principal) Acute diastolic HF (heart failure) ([facility] 11/11/2022 Yes    CHF (congestive heart failure), NYHA class I, acute on chronic, combined ([facility] 11/11/2022 Yes   Acute on chronic diastolic hf  Afib  Abd abscess  HTN  Chronic respiratory failure  Assessment & Plan  11/14: Continue with current care. Rehab referrals made. Spoke with cardiology recommends for acute rehab. Follow-up with physical therapy note. Denies any needs.   Continue oxygen therapy to keep oxygen saturation above 88%  Electronically signed by Kenzie Kaplan MD on 11/14/22 at 9:27 AM EST

## 2022-11-14 NOTE — PROGRESS NOTES
Physical Therapy Med Surg Initial Assessment  Facility/Department: [address] [place] [address]  [facility]       NAME: Lilian Rodriguez  : 1932 (80 y.o.)  MRN: [id number]  CODE STATUS: Full Code    Date of Service: 2022    Patient Diagnosis(es): Acute diastolic HF (heart failure) ([facility]) [I50.31]  CHF (congestive heart failure), NYHA class I, acute on chronic, combined ([facility] [I50.43]  Dyspnea, unspecified type [G10.29]  Systolic congestive heart failure, unspecified HF chronicity ([facility] [I50.20]   Chief Complaint   Patient presents with    Shortness of Breath     X2 days     Patient Active Problem List    Diagnosis Date Noted    A-fib ([facility]     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     Acute diastolic HF (heart failure) ([facility] 2022    CHF (congestive heart failure), NYHA class I, acute on chronic, combined ([facility] 2022    Difficulty in walking 2022    Muscle weakness (generalized) 2022    Change or removal of drains 2022    Adnexal mass 2022    Abscess of abdominal cavity ([facility] 2022    Intra-abdominal abscess ([facility] 2022    Aortic thrombus ([facility] 2022    Abscess 2022    Hx of drainage of abscess 2022    Abdominal pain 2022    Streptococcus viridans infection 2022    Abdominal mass 2022    Right lower quadrant abdominal pain 2022    Hyponatremia 2022    Hypokalemia 2022    Anemia 2022    CHF (congestive heart failure) ([address] 2022    Hypothyroid 2022    Central retinal vein occlusion, left eye, stable 2021    Lumbar stenosis with neurogenic claudication 2016    Chronic diastolic CHF (congestive heart failure) ([facility] 2022    Acute cystitis with hematuria 2021    Acute on chronic combined systolic (congestive) and diastolic (congestive) heart failure ([facility] 2021    Pleural effusion 2021    Hypoxia     Acute pulmonary edema ([facility] Gait abnormality 01/27/2021    Acute on chronic combined systolic and diastolic CHF (congestive heart failure) ([address] 01/25/2021    Essential hypertension 02/16/2018    Shortness of breath     Vertigo     Thoracic aortic aneurysm without rupture 06/23/2017    Descending aortic aneurysm ([address] 06/23/2017    Osteoarthritis     Myelopathy ([address]     Headache     Depression     Acquired scoliosis 06/02/2016    Osteoporosis 06/02/2016    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    Excess weight 06/02/2016    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/02/2016        Past Medical History:   Diagnosis Date    Ascending aortic aneurysm 6/23/2017    Charcot Arleen Tooth muscular atrophy     CHF (congestive heart failure) (HCC)     Chronic diastolic CHF (congestive heart failure) ([address] 4/1/2022    Depression     Descending aortic aneurysm ([address] 6/23/2017    Essential hypertension 2/16/2018    Headache     HTN (hypertension)     Impaired mobility and activities of daily living     Lumbar stenosis with neurogenic claudication     Myelopathy (HCC)     Osteoarthritis      Past Surgical History:   Procedure Laterality Date    IR INS PICC VAD W SQ PORT GREATER THAN 5  9/23/2022    IR INS PICC VAD W SQ PORT GREATER THAN 5 9/23/2022 [facility] SPECIAL PROCEDURE    JOINT REPLACEMENT Bilateral     knees    OTHER SURGICAL HISTORY Right 07/21/2022    cat scan guided abdominal mass aspiration with drain placement by Dr. Yumiko Daniels Left 02/25/2021    LEFT PLEURAL CATHETER INSERTION performed by Florentin Wills MD at [place] [id number] Left 01/29/2021    total of 755 cc removed per Dr Jaimes Both specimen sent to lab       Chart Reviewed: Yes  Diagnosis: Acute diastolic HF (heart failure) ([address]  General Comment  Comments: Pt resting in bed - agreeable to PT evaluation    Restrictions:  Restrictions/Precautions: Fall Risk     SUBJECTIVE:   Subjective: Pt with minimal English comprehension, however follows visual and tactile cues. Teleinterpretation unable to be utilized for communication d/t pt's language/dialect not available. Pain  Pain: Pt describes abdominal pain pre and post session. Unrated formally, however RN notified. Prior Level of Function:  Social/Functional History  Lives With: Alone  Type of Home: House  Home Layout: One level  Home Access: Stairs to enter with rails  Entrance Stairs - Number of Steps: 2  Entrance Stairs - Rails: Both  Bathroom Shower/Tub: Tub/Shower unit  Bathroom Equipment: Grab bars in shower  Home Equipment: Lorean Cumins, quad  ADL Assistance: Independent  Homemaking Assistance: Independent  Ambulation Assistance: Independent ([facility] and furniture walking in the home)  Transfer Assistance: Independent  Patient's  Info: family - son lives close by; dtr lives in [place]  Additional Comments: Information gathered through chart review and confirmed with nursing and case management. Pt is [place] speaking but dialect unavailable through [place] [id number] ; family not present today. Per Case management, pt has new supplemental O2 and IV abx every 4hrs until Dec 5. OBJECTIVE:   Vision  Vision:  (Per chart review, has glasses but does not wear)  Hearing: Exceptions to [facility]  Hearing Exceptions: Hard of hearing/hearing concerns; No hearing aid    Cognition:  Overall Orientation Status: Within Functional Limits  Follows Commands: Within Functional Limits    Observation/Palpation  Observation: No acute distress noted. Pt pleasant and motivated. B AFOs unavailble for assessment.     ROM:  RLE PROM: WFL  RLE General PROM: ankle DF to neutral  LLE PROM: WFL  LLE General PROM: ankle DF to neutral    Strength:  Strength RLE  Strength RLE: WFL  Comment: Drop foot  Strength LLE  Strength LLE: WFL  Comment: Drop foot    Neuro:  Balance  Sitting - Static: Good  Sitting - Dynamic: Good  Standing - Static: Fair                      Tone: Normal    Sensation:  (not formally assessed)    Bed mobility  Bed Mobility Comments: Pt sitting up at EOB    Transfers  Sit to Stand: Contact guard assistance  Stand to Sit: Contact guard assistance  Bed to Chair: Contact guard assistance  Comment: Slow to complete. Heavy use of UEs for support and balance. Ambulation  Comments: Deferred - pt without B AFOs                   Activity Tolerance  Activity Tolerance: Patient tolerated treatment well    Patient Education  Education Given To: Patient  Education Provided: Role of Therapy;Plan of Care  Education Method: Verbal  Education Outcome: Continued education needed       ASSESSMENT:   Body Structures, Functions, Activity Limitations Requiring Skilled Therapeutic Intervention: Decreased functional mobility ; Decreased ADL status; Decreased strength;Decreased safe awareness;Decreased endurance;Decreased balance;Decreased cognition;Decreased vision/visual deficit; Decreased coordination; Increased pain;Decreased body mechanics  Decision Making: High Complexity  History: High  Exam: Med  Clinical Presentation: High    Therapy Prognosis: Good  Barriers to Learning: language barrier         DISCHARGE RECOMMENDATIONS:  Discharge Recommendations: Continue to assess pending progress, Patient would benefit from continued therapy after discharge    Assessment: Continued PT indicated to progress mobility and facilitate DC at highest level of indep and safety. Assessment limited this session d/t absence of B AFOs. Requires PT Follow-Up: Yes       PLAN OF CARE:  Physcial Therapy Plan  General Plan: 1 time a day 3-6 times a week  Current Treatment Recommendations: Strengthening, ROM, Balance training, Functional mobility training, Transfer training, ADL/Self-care training, Endurance training, Gait training, Stair training, Neuromuscular re-education, Home exercise program, Safety education & training, Patient/Caregiver education & training, Equipment evaluation, education, & procurement, Positioning    Safety Devices  Type of Devices:  All fall risk precautions in place    Goals:  Long Term Goals  Long Term Goal 1: Pt to complete bed mobility with indep  Long Term Goal 2: Pt to complete transfers with indep  Long Term Goal 3: Pt to ambulate 50-150ft with LRD and Supervision  Long Term Goal 4: Pt to complete 2 steps with HR and CGA    AMPAC (6 CLICK) BASIC MOBILITY  AM-PAC Inpatient Mobility Raw Score : 16     Therapy Time:   Individual   Time In 1124   Time Out 1132   Minutes 8           Nita Levi, 11/14/22 at 12:24 PM         Definitions for assistance levels  Independent = pt does not require any physical supervision or assistance from another person for activity completion. Device may be needed.   Stand by assistance = pt requires verbal cues or instructions from another person, close to but not touching, to perform the activity  Minimal assistance= pt performs 75% or more of the activity; assistance is required to complete the activity  Moderate assistance= pt performs 50% of the activity; assistance is required to complete the activity  Maximal assistance = pt performs 25% of the activity; assistance is required to complete the activity  Dependent = pt requires total physical assistance to accomplish the task

## 2022-11-14 NOTE — PROGRESS NOTES
[facility] OCCUPATIONAL THERAPY EVALUATION - ACUTE     NAME: Maria C Powell  : 1932 (80 y.o.)  MRN: [id number]  CODE STATUS: Full Code  Room: Z710/O295-20    Date of Service: 2022    Patient Diagnosis(es): Acute diastolic HF (heart failure) ([facility]) [I50.31]  CHF (congestive heart failure), NYHA class I, acute on chronic, combined ([facility] [I50.43]  Dyspnea, unspecified type [G01.88]  Systolic congestive heart failure, unspecified HF chronicity ([address] [I50.20]   Patient Active Problem List    Diagnosis Date Noted    A-fib ([facility]     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     Acute diastolic HF (heart failure) ([address] 2022    CHF (congestive heart failure), NYHA class I, acute on chronic, combined ([facility] 2022    Difficulty in walking 2022    Muscle weakness (generalized) 2022    Change or removal of drains 2022    Adnexal mass 2022    Abscess of abdominal cavity ([facility] 2022    Intra-abdominal abscess ([address] 2022    Aortic thrombus ([facility] 2022    Abscess 2022    Hx of drainage of abscess 2022    Abdominal pain 2022    Streptococcus viridans infection 2022    Abdominal mass 2022    Right lower quadrant abdominal pain 2022    Hyponatremia 2022    Hypokalemia 2022    Anemia 2022    CHF (congestive heart failure) ([address] 2022    Hypothyroid 2022    Central retinal vein occlusion, left eye, stable 2021    Lumbar stenosis with neurogenic claudication 2016    Chronic diastolic CHF (congestive heart failure) ([address] 2022    Acute cystitis with hematuria 2021    Acute on chronic combined systolic (congestive) and diastolic (congestive) heart failure ([address] 2021    Pleural effusion 2021    Hypoxia     Acute pulmonary edema (HCC)     Gait abnormality 2021    Acute on chronic combined systolic and diastolic CHF (congestive heart failure) ([facility] 01/25/2021    Essential hypertension 02/16/2018    Shortness of breath     Vertigo     Thoracic aortic aneurysm without rupture 06/23/2017    Descending aortic aneurysm ([address] 06/23/2017    Osteoarthritis     Myelopathy ([address]     Headache     Depression     Acquired scoliosis 06/02/2016    Osteoporosis 06/02/2016    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    Excess weight 06/02/2016    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/02/2016        Past Medical History:   Diagnosis Date    Ascending aortic aneurysm 6/23/2017    Charcot Arleen Tooth muscular atrophy     CHF (congestive heart failure) (HCC)     Chronic diastolic CHF (congestive heart failure) ([address] 4/1/2022    Depression     Descending aortic aneurysm ([facility] 6/23/2017    Essential hypertension 2/16/2018    Headache     HTN (hypertension)     Impaired mobility and activities of daily living     Lumbar stenosis with neurogenic claudication     Myelopathy (HCC)     Osteoarthritis      Past Surgical History:   Procedure Laterality Date    IR INS PICC VAD W SQ PORT GREATER THAN 5  9/23/2022    IR INS PICC VAD W SQ PORT GREATER THAN 5 9/23/2022 [facility] SPECIAL PROCEDURE    JOINT REPLACEMENT Bilateral     knees    OTHER SURGICAL HISTORY Right 07/21/2022    cat scan guided abdominal mass aspiration with drain placement by Dr. Yoel Romero Left 02/25/2021    LEFT PLEURAL CATHETER INSERTION performed by Sandra Dixon MD at [place] [address] Left 01/29/2021    total of 755 cc removed per Dr Eduar Machado specimen sent to lab        Restrictions  Restrictions/Precautions: Fall Risk       Safety Devices: Safety Devices  Type of Devices: All fall risk precautions in place     Patient's date of birth confirmed: Yes    General:  Chart Reviewed: Yes  Patient assessed for rehabilitation services?: Yes    Subjective  Subjective: \"Up in chair good. \" - Pt with limited English Proficiency       Pain at start of treatment: No    Pain at end of treatment: No    Location:   Nursing notified: Not Applicable  RN:   Intervention: Repositioned    Prior Level of Function:  Social/Functional History  Lives With: Alone  Type of Home: House  Home Layout: One level  Home Access: Stairs to enter with rails  Entrance Stairs - Number of Steps: 2  Entrance Stairs - Rails: Both  Bathroom Shower/Tub: Tub/Shower unit  Bathroom Equipment: Grab bars in shower  Home Equipment: Cane (B AFOs)  ADL Assistance: Independent  Homemaking Assistance:  (Son assists with shopping, family checks on daily)  Ambulation Assistance: Independent (Cane  and B AFOs)  Transfer Assistance: Independent  Active : No  Patient's  Info: family - son lives close by; dtr lives in [place]  Additional Comments: Information gathered through chart review and confirmed with nursing and case management. Pt is [place] speaking but dialect unavailable through [employer] ; family not present today    OBJECTIVE:     Orientation Status:  Orientation  Overall Orientation Status: Within Functional Limits    Observation:  Observation/Palpation  Posture: Fair  Observation: Pt alert and attentive, agreeable to OT eval, B AFOs unavailable to assessment    Cognition Status:  Cognition  Overall Cognitive Status: WFL  Cognition Comment: Appears WFL, has limited [place] proficiency but able to follow visual cues    Perception Status:  Perception  Overall Perceptual Status: [facility]    Vision and Hearing Status:  Hearing  Hearing: Exceptions to [facility]  Hearing Exceptions: Hard of hearing/hearing concerns; No hearing aid   Vision - Basic Assessment  Prior Vision:  [facility]WORK [facility] for ambulating in room)    GROSS ASSESSMENT AROM/PROM:  AROM: Within functional limits       ROM:   LUE AROM (degrees)  LUE AROM : WFL  LUE General AROM: Arthritic changes throughout  Left Hand AROM (degrees)  Left Hand AROM: WFL  Left Hand General AROM: Arthritic changes throughout  RUE AROM (degrees)  RUE AROM : WFL  RUE General AROM: Arthritic changes throughout  Right Hand AROM (degrees)  Right Hand AROM: WFL  Right Hand General AROM: Arthritic changes throughout    UE STRENGTH:  Strength: Generally decreased, functional    UE COORDINATION:  Coordination: Generally decreased, functional    UE TONE:  Tone: Normal    UE SENSATION:  Sensation:  (Per chart has neuropathy in feet)    Hand Dominance:  Hand Dominance  Hand Dominance: Right    ADL Status:  ADL  Feeding: Independent  Grooming: Supervision  UE Bathing: Stand by assistance  LE Bathing: Minimal assistance  UE Dressing: Stand by assistance  LE Dressing: Minimal assistance  Toileting: Minimal assistance  Additional Comments: Simulated ADLs as above, limited d/t fatigue and B AFOs not present  Toilet Transfers  Toilet Transfer: Unable to assess  Toilet Transfers Comments: Anticipate min A    Functional Mobility:    Transfers  Sit to stand: Contact guard assistance  Stand to sit: Contact guard assistance  Transfer Comments: AFOs not present    Patient ambulated to bedside chair with Handheld assist at [address] A level. Limited in distance d/t lack of AFOs and B foot drop.     Bed Mobility  Bed mobility  Supine to Sit: Unable to assess  Sit to Supine: Unable to assess  Bed Mobility Comments: Pt sitting up at EOB start of tx, transfer to chair at end of tx    Seated and Standing Balance:  Balance  Sitting: Intact  Standing: Impaired  Standing - Static: Fair  Standing - Dynamic: Fair    Functional Endurance:  Activity Tolerance  Activity Tolerance: Patient Tolerated treatment well    D/C Recommendations:  OT D/C RECOMMENDATIONS  REQUIRES OT FOLLOW-UP: Yes    Equipment Recommendations:  OT Equipment Recommendations  Other: Continue to assess    OT Education:   Patient Education  Education Given To: Patient  Education Provided: Role of Therapy;Plan of Care  Education Method: Verbal  Barriers to Learning: Other (Comment) (Language)  Education Outcome: Continued education needed    OT Follow Up:   OT D/C RECOMMENDATIONS  REQUIRES OT FOLLOW-UP: Yes       Assessment/Discharge Disposition:  Assessment: Pt is a 80year old woman from home who presents to [id number] [facility] with HF who demonstrates the above deficits which impact her ability to perform ADLs and IADls at her baseline level of independence. Pt limited d/t fatigue as well as her lack of AFOs which she typically wears at baseline Pt would benefit from continued OT to maximize independence and safety with ADL tasks.   Performance deficits / Impairments: Decreased functional mobility , Decreased ADL status, Decreased strength, Decreased high-level IADLs, Decreased endurance, Decreased balance, Decreased coordination  Prognosis: Good  Discharge Recommendations: Continue to assess pending progress  Decision Making: Medium Complexity  History: Pt's medical history is moderately complex  Exam: Pt. has 7 performance deficits  Assistance / Modification: Pt requires min A    AMPAC (Six Click) Self care Score   How much help for putting on and taking off regular lower body clothing?: A Lot  How much help for Bathing?: A Lot  How much help for Toileting?: A Little  How much help for putting on and taking off regular upper body clothing?: A Little  How much help for taking care of personal grooming?: A Little  How much help for eating meals?: A Little  AM-[facility] Inpatient Daily Activity Raw Score: 16  AM-PAC Inpatient ADL T-Scale Score : 35.96  ADL Inpatient CMS 0-100% Score: 53.32    Therapy key for assistance levels -   Independent/Mod I = Pt. is able to perform task with no assistance but may require a device   Stand by assistance = Pt. does not perform task at an independent level but does not need physical assistance, requires verbal cues  Minimal, Moderate, Maximal Assistance = Pt. requires physical assistance (25%, 50%, 75% assist from helper) for task but is able to actively participate in task   Dependent = Pt. requires total assistance with task and is not able to actively participate with task completion     Plan:  Occupational Therapy Plan  Times Per Week: 1-4x  Therapy Duration:  (Length of acute stay)  Current Treatment Recommendations: Balance training, Functional mobility training, Endurance training, Strengthening, Pain management, Safety education & training, Patient/Caregiver education & training, Equipment evaluation, education, & procurement, Home management training, Self-Care / ADL    Goals:   Patient will:    - Improve functional endurance to tolerate/complete 30 mins of ADL's  - Be Mod I in UB ADLs   - Be SBA in LB ADLs  - Be SBA I in ADL transfers without LOB  - Be SBA in toileting tasks  - Improve B UE strength and endurance to 4/5 in order to participate in self-care activities as projected. - Access appropriate D/C site with as few architectural barriers as possible. - Sequence self-care tasks with no cues for safety and sequencing    Patient Goal: Patient goals : \"To go home. \"     Discussed and agreed upon: Yes Comments:       Therapy Time:   Individual   Time In 1124   Time Out 1132   Minutes 8     Eval: 8 minutes     Electronically signed by:     SERAFIN Guthrie,   11/14/2022, 1:03 PM

## 2022-11-14 NOTE — CARE COORDINATION
PER PT/OT UNABLE TO EVAL PT D/T PT NOT HAVING LEG BRACES. CM SPOKE WITH PATIENTS SON, WILL BRING IN LATER TODAY VS TOMORROW. AWARE TRYING TO DETERMINE HHC VS REHAB. SECOND IMM REVIEWED. [facility], [address]

## 2022-11-14 NOTE — PLAN OF CARE
See OT evaluation for all goals and OT POC.  Electronically signed by OSMANI Moyer/L on 11/14/2022 at 1:05 PM

## 2022-11-14 NOTE — CARE COORDINATION
Patient declined CHF education this morning. She didn't appear to feel well and kept shrugging her shoulders. I mailed the information to her residence for review upon discharge. Plan is [facility] vs Rehab.  Electronically signed by Jarred Pabon RN on 11/14/2022 at 1:51 PM

## 2022-11-15 PROBLEM — K65.1 ABSCESS OF ABDOMINAL CAVITY (HCC): Status: RESOLVED | Noted: 2022-08-09 | Resolved: 2022-11-15

## 2022-11-15 PROBLEM — K21.9 GASTRO-ESOPHAGEAL REFLUX DISEASE WITHOUT ESOPHAGITIS: Status: ACTIVE | Noted: 2022-01-01

## 2022-11-15 PROBLEM — L02.91 ABSCESS: Status: RESOLVED | Noted: 2022-07-29 | Resolved: 2022-11-15

## 2022-11-15 PROBLEM — I51.3 INTRACARDIAC THROMBOSIS, NOT ELSEWHERE CLASSIFIED: Status: ACTIVE | Noted: 2022-08-18

## 2022-11-15 LAB
ANION GAP SERPL CALCULATED.3IONS-SCNC: 19 MEQ/L (ref 9–15)
BUN BLDV-MCNC: 10 MG/DL (ref 8–23)
CALCIUM SERPL-MCNC: 8.9 MG/DL (ref 8.5–9.9)
CHLORIDE BLD-SCNC: 95 MEQ/L (ref 95–107)
CO2: 26 MEQ/L (ref 20–31)
CREAT SERPL-MCNC: 0.55 MG/DL (ref 0.5–0.9)
GFR SERPL CREATININE-BSD FRML MDRD: >60 ML/MIN/{1.73_M2}
GLUCOSE BLD-MCNC: 94 MG/DL (ref 70–99)
MAGNESIUM: 1.9 MG/DL (ref 1.7–2.4)
POTASSIUM SERPL-SCNC: 3.9 MEQ/L (ref 3.4–4.9)
SODIUM BLD-SCNC: 140 MEQ/L (ref 135–144)

## 2022-11-15 PROCEDURE — 6370000000 HC RX 637 (ALT 250 FOR IP): Performed by: INTERNAL MEDICINE

## 2022-11-15 PROCEDURE — 36415 COLL VENOUS BLD VENIPUNCTURE: CPT

## 2022-11-15 PROCEDURE — 83735 ASSAY OF MAGNESIUM: CPT

## 2022-11-15 PROCEDURE — 97116 GAIT TRAINING THERAPY: CPT

## 2022-11-15 PROCEDURE — 2580000003 HC RX 258: Performed by: INTERNAL MEDICINE

## 2022-11-15 PROCEDURE — 99222 1ST HOSP IP/OBS MODERATE 55: CPT | Performed by: PHYSICAL MEDICINE & REHABILITATION

## 2022-11-15 PROCEDURE — 2060000000 HC ICU INTERMEDIATE R&B

## 2022-11-15 PROCEDURE — 6360000002 HC RX W HCPCS: Performed by: INTERNAL MEDICINE

## 2022-11-15 PROCEDURE — 80048 BASIC METABOLIC PNL TOTAL CA: CPT

## 2022-11-15 PROCEDURE — 99233 SBSQ HOSP IP/OBS HIGH 50: CPT | Performed by: INTERNAL MEDICINE

## 2022-11-15 PROCEDURE — 99221 1ST HOSP IP/OBS SF/LOW 40: CPT | Performed by: INTERNAL MEDICINE

## 2022-11-15 RX ORDER — FUROSEMIDE 40 MG/1
40 TABLET ORAL DAILY
Status: DISCONTINUED | OUTPATIENT
Start: 2022-11-15 | End: 2022-11-16 | Stop reason: HOSPADM

## 2022-11-15 RX ORDER — SUCRALFATE 1 G/1
1 TABLET ORAL EVERY 6 HOURS SCHEDULED
Status: DISCONTINUED | OUTPATIENT
Start: 2022-11-15 | End: 2022-11-16 | Stop reason: HOSPADM

## 2022-11-15 RX ADMIN — Medication 10 ML: at 20:18

## 2022-11-15 RX ADMIN — PIPERACILLIN AND TAZOBACTAM 3375 MG: 3; .375 INJECTION, POWDER, FOR SOLUTION INTRAVENOUS at 04:10

## 2022-11-15 RX ADMIN — FUROSEMIDE 40 MG: 40 TABLET ORAL at 11:11

## 2022-11-15 RX ADMIN — RIVAROXABAN 15 MG: 15 TABLET, FILM COATED ORAL at 11:11

## 2022-11-15 RX ADMIN — LACTOBACILLUS TAB 4 TABLET: TAB at 15:42

## 2022-11-15 RX ADMIN — PIPERACILLIN AND TAZOBACTAM 3375 MG: 3; .375 INJECTION, POWDER, FOR SOLUTION INTRAVENOUS at 20:21

## 2022-11-15 RX ADMIN — DIGOXIN 125 MCG: 125 TABLET ORAL at 11:10

## 2022-11-15 RX ADMIN — SACUBITRIL AND VALSARTAN 1 TABLET: 24; 26 TABLET, FILM COATED ORAL at 11:11

## 2022-11-15 RX ADMIN — FERROUS SULFATE TAB 325 MG (65 MG ELEMENTAL FE) 325 MG: 325 (65 FE) TAB at 18:58

## 2022-11-15 RX ADMIN — FERROUS SULFATE TAB 325 MG (65 MG ELEMENTAL FE) 325 MG: 325 (65 FE) TAB at 11:11

## 2022-11-15 RX ADMIN — POTASSIUM CHLORIDE 20 MEQ: 1500 TABLET, EXTENDED RELEASE ORAL at 20:18

## 2022-11-15 RX ADMIN — POTASSIUM CHLORIDE 20 MEQ: 1500 TABLET, EXTENDED RELEASE ORAL at 11:11

## 2022-11-15 RX ADMIN — LEVOTHYROXINE SODIUM 88 MCG: 88 TABLET ORAL at 11:47

## 2022-11-15 RX ADMIN — LACTOBACILLUS TAB 4 TABLET: TAB at 11:11

## 2022-11-15 RX ADMIN — PANTOPRAZOLE SODIUM 40 MG: 40 TABLET, DELAYED RELEASE ORAL at 11:11

## 2022-11-15 RX ADMIN — SUCRALFATE 1 G: 1 TABLET ORAL at 18:58

## 2022-11-15 RX ADMIN — CITALOPRAM HYDROBROMIDE 20 MG: 20 TABLET ORAL at 11:11

## 2022-11-15 RX ADMIN — LACTOBACILLUS TAB 4 TABLET: TAB at 20:18

## 2022-11-15 RX ADMIN — Medication 2000 UNITS: at 18:57

## 2022-11-15 RX ADMIN — SACUBITRIL AND VALSARTAN 1 TABLET: 24; 26 TABLET, FILM COATED ORAL at 20:18

## 2022-11-15 RX ADMIN — PIPERACILLIN AND TAZOBACTAM 3375 MG: 3; .375 INJECTION, POWDER, FOR SOLUTION INTRAVENOUS at 11:45

## 2022-11-15 RX ADMIN — CARVEDILOL 6.25 MG: 6.25 TABLET, FILM COATED ORAL at 20:18

## 2022-11-15 RX ADMIN — SUCRALFATE 1 G: 1 TABLET ORAL at 11:13

## 2022-11-15 RX ADMIN — Medication 10 ML: at 11:48

## 2022-11-15 RX ADMIN — CARVEDILOL 6.25 MG: 6.25 TABLET, FILM COATED ORAL at 11:13

## 2022-11-15 ASSESSMENT — ENCOUNTER SYMPTOMS
BELCHING: 0
EYE PAIN: 0
STRIDOR: 0
CHEST TIGHTNESS: 0
CONSTIPATION: 1
PHOTOPHOBIA: 0
SORE THROAT: 0
EYES NEGATIVE: 1
BACK PAIN: 1
WHEEZING: 0
ABDOMINAL PAIN: 0
NAUSEA: 0
ABDOMINAL PAIN: 1
VOMITING: 0
EYE REDNESS: 0
SHORTNESS OF BREATH: 1
BLOOD IN STOOL: 0
SHORTNESS OF BREATH: 0
DIARRHEA: 0
COUGH: 0

## 2022-11-15 NOTE — CARE COORDINATION
[facility] Pre-Admission Screening Document      Patient Name: Marin Foley       MRN: [id number]    : 1932    Age: 80 y.o. Gender: female   Payor: Payor: MEDICARE / Plan: MEDICARE PART A AND B / Product Type: *No Product type* /   MSSP: No    Admitted from: [facility] Floor: 1W  Attending Care Provider: Domingo Velazquez MD  Inpatient Rehab Referring Care Provider: Domingo Velazquez MD  Primary Care Provider: John Navarro MD  Inpatient Treatment Team including Consults: Treatment Team: Attending Provider: Domingo Velazquez MD; Consulting Physician: Scarlett Mahajan MD; Consulting Physician: Juan Mccallum DO; Registered Nurse: Elijah Andre RN; : Mirna Brown RN; Utilization Reviewer: Molly Valencia RN    Reason for Hospitalization:   1. Systolic congestive heart failure, unspecified HF chronicity ([facility] [address]    2. Dyspnea, unspecified type      Chief Complaint   Patient presents with    Shortness of Breath     X2 days     Isolation:No active isolations    Hospital Course:  Admit Date: 2022 12:04 PM  Inpatient Rehab Referral Date: 11/15/22  Narrative of hospital course/history of present illness: 80 yr old female presented to ED with  increasing shortness of breath and leg edema for 2 days. Currently has intra-abdominal abscess and is getting IV Invanz. ID: Intra-abdominal abscess treated with percutaneous draiange on Invanz, changed to Zosyn for possible pseudomonas infection by Dr Jaquelin Avila as outpatient. Stop date for the abx will be 2022. Cardiology: Acute on chronic DHF, AF. Meds adjusted  Gastro: Post prandial epigastric pain-likely multifactorial possibly attributed to intra-abdominal abscess, gastritis, peptic ulcer disease. PPI and Carafate        Medical & Surgical History/Current Comorbidities:  Past Medical History:   Diagnosis Date    Ascending aortic aneurysm 2017    Charcot Arleen Tooth muscular atrophy     CHF (congestive heart failure) (HCC)     Chronic diastolic CHF (congestive heart failure) ([facility] [address] 4/1/2022    Depression     Descending aortic aneurysm ([facility] [address] 6/23/2017    Essential hypertension 2/16/2018    Headache     HTN (hypertension)     Impaired mobility and activities of daily living     Lumbar stenosis with neurogenic claudication     Myelopathy (HCC)     Osteoarthritis      Past Surgical History:   Procedure Laterality Date    IR INS PICC VAD W SQ PORT GREATER THAN 5  9/23/2022    IR INS PICC VAD W SQ PORT GREATER THAN 5 9/23/2022 [facility] SPECIAL PROCEDURE    JOINT REPLACEMENT Bilateral     knees    OTHER SURGICAL HISTORY Right 07/21/2022    cat scan guided abdominal mass aspiration with drain placement by Dr. Devendra Hernandez Left 02/25/2021    LEFT PLEURAL CATHETER INSERTION performed by Bertina Harada, MD at [place] [address] Left 01/29/2021    total of 755 cc removed per Dr Kyra Bynum specimen sent to lab       Advanced Directives:  Advance Directives       Power of  Living Will ACP-Advance Directive ACP-Power of     Not on File Not on File Not on File Not on File            Labs/Infection Control:  Recent Labs     11/14/22  0600 11/15/22  0542 11/16/22  0529   WBC 7.2  --   --    HGB 11.8*  --   --    HCT 37.1  --   --      --   --    BUN 8 10 11   CREATININE 0.46* 0.55 0.52   GLUCOSE 109* 94 97    140 139   K 3.0* 3.9 3.3*   CALCIUM 8.5 8.9 8.8      Blood cultures:  No results for input(s): BC in the last 72 hours. Urinalysis/C&S:  No results for input(s): NITRITE, LABCAST, WBCUA, RBCUA, MUCUS, TRICHOMONAS, YEAST, BACTERIA, LEUKOCYTESUR, BLOODU, GLUCOSEU, KETUA, AMORPHOUS, LABURIN in the last 72 hours. Radiology:  CTA CHEST W WO CONTRAST PE Eval  Result Date: 11/11/2022  No evidence of pulmonary embolism.  Severe cardiomegaly with evidence of congestive heart failure manifesting as mild edema and bilateral trace pleural effusions as well as significant right heart dysfunction including partially visualized perihepatic ascites. CT ABDOMEN PELVIS W IV CONTRAST   1. The right-sided abscess drain has been completely pulled out of the abscess and now lies in the right abdominal subcutaneous soft tissues. 2.The right lower abdominal abscess has increased in size when compared to prior study dating September 14 likely due to accumulation of infectious fluid due to displacement of the drainage catheter. 3.Again seen is a large cystic mass in the lower pelvis. COMPARISON: September 14, 2022 DIAGNOSIS: Right lower abdomen abscess COMMENTS: None TECHNIQUE: Spiral scanning of the chest, abdomen and pelvis was performed after  administration of intravenous contrast. CT Dose-Length Product (estimate related to radiation exposure from this exam):  314.66  mGy*cm. FINDINGS: ABDOMEN The visualized portion of the lung bases demonstrates a small effusion in the left thoracic cavity. The liver is of normal size and attenuation without focal lesions. The spleen is unremarkable. Kidneys demonstrate prompt enhancement and excretion of contrast without signs of hydronephrosis or focal mass. The pancreas and adrenals are unremarkable. The upper abdominal bowel loops appear normal. Again seen is ectasia of the visualized abdominal aorta and tortuosity, unchanged from prior. 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 There are no signs of upper abdominal fluid collections. PELVIS  Again seen is a large abscess in the right lower abdomen/pelvis abutting the cecum now measuring 6.6 x 9.4 in transverse and AP dimensions, previously measuring 6 x 6.7 and AP and transverse dimensions. The previously seen abscess drain has now been pulled out of the abscess and lies in the subcutaneous soft tissues and is not contributing to any drainage of the abscess consistent with abscess enlargement. Again seen is a large cystic mass in the pelvis.     XR CHEST PORTABLE  Result Date: 11/11/2022  Cardiomegaly with multifocal bilateral airspace disease favored to represent CHF Left lung base consolidation which could represent atelectasis, pleural effusion or less likely pneumonia. .         Medications/IV's:  The patient is currently on xarelto for DVT prophylaxis. Scheduled:    furosemide, 40 mg, Oral, Daily    sucralfate, 1 g, Oral, 4 times per day    piperacillin-tazobactam, 3,375 mg, IntraVENous, Q8H    sacubitril-valsartan, 1 tablet, Oral, BID    potassium chloride, 20 mEq, Oral, BID    sodium chloride flush, 5-40 mL, IntraVENous, 2 times per day    digoxin, 125 mcg, Oral, Daily    Vitamin D, 2,000 Units, Oral, Dinner    rivaroxaban, 15 mg, Oral, Daily with breakfast    levothyroxine, 88 mcg, Oral, Daily    ferrous sulfate, 325 mg, Oral, BID WC    carvedilol, 6.25 mg, Oral, BID    citalopram, 20 mg, Oral, Daily    pantoprazole, 40 mg, Oral, Daily    lactobacillus acidophilus, 4 tablet, Oral, TID    PRN:  sodium chloride flush, sodium chloride, ondansetron **OR** ondansetron, polyethylene glycol, acetaminophen **OR** acetaminophen    Allergies: Allergies   Allergen Reactions    Latex     Tape Jitendra Dame Tape]          Most Recent Vitals, Height and Weight  /72   Pulse 74   Temp 98.7 °F (37.1 °C) (Oral)   Resp 18   Ht 4' 11\" (1.499 m)   Wt 150 lb 4.8 oz (68.2 kg)   SpO2 96%   BMI 30.36 kg/m²     Weight Bearing Restrictions: wbat    Current Diet Order: ADULT DIET; Regular;  Low Sodium (2 gm)    Skin: wdl  Old YOUSIF scar RLQ       Lungs:   Respiratory  Respiratory Quality/Effort: Dyspnea with exertion  Chest Assessment: Chest expansion symmetrical  Breath Sounds  Right Upper Lobe: Clear  Right Middle Lobe: Diminished  Right Lower Lobe: Diminished  Left Upper Lobe: Clear  Left Lower Lobe: Diminished      Cognition and Behavior:  Language Preference (if other than English):      Alertness/Behavior  Neuro (WDL): Within Defined Limits  Level of Consciousness: Alert (0)  History of Falling: Yes Cognition Comment: Appears WFL, has limited [place] proficiency but able to follow visual cues    Short Term Memory Deficits  Patient's memory adequate to safely complete daily activities?: No  History of Falling: Yes    Safety  Patient's Judgement Adequate to Safely Complete Daily Activities: No  Patient Able to Express Needs/Desires: No       Prior Level of Function and Living Arrangements:  Social/Functional History  Lives With: Alone  Type of Home: House  Home Layout: One level  Home Access: Stairs to enter with rails  Entrance Stairs - Number of Steps: 2  Entrance Stairs - Rails: Both  Bathroom Shower/Tub: Tub/Shower unit  Bathroom Equipment: Grab bars in shower  Home Equipment: Cane (B AFOs)  ADL Assistance: Independent  Homemaking Assistance:  (Son assists with shopping, family checks on daily)  Ambulation Assistance: Independent (Cane  and B AFOs)  Transfer Assistance: Independent  Active : No  Patient's  Info: family - son lives close by; dtr lives in [place]  Additional Comments: Information gathered through chart review and confirmed with nursing and case management. Pt is [place] speaking but dialect unavailable through [address] ; family not present today  Living Arrangements: Alone  Support Systems: Children  Type of Home Care Services: Skilled Therapy  Dental Appliances: None  Vision - Corrective Lenses: None  Hearing Aid: None  Personal Equipment:   Dental Appliances: None  Vision - Corrective Lenses: None  Hearing Aid: None      CURRENT FUNCTIONAL LEVEL:  Physical Therapy  Bed mobility:  Bed mobility  Bed Mobility Comments: Pt sitting up at EOB start of tx, transfer to chair at end of tx (11/15/22 1604)  Transfers:  Transfers  Sit to Stand: Stand by assistance (11/15/22 1604)  Stand to Sit: Stand by assistance (11/15/22 1604)  Bed to Chair: Stand by assistance (11/15/22 1604)  Comment: VCs for handplacement to increase ease and safety.  (11/15/22 1604)  Gait:   Ambulation  Surface: Level tile (11/15/22 1605)  Device: Rolling Walker (11/15/22 1605)  Other Apparatus: AFO; Left;Right;O2 (11/15/22 1605)  Assistance: Stand by assistance (11/15/22 1605)  Quality of Gait: Assist to manage O2 line. (11/15/22 1605)  Gait Deviations: Slow Lea;Decreased step length (11/15/22 1605)  Distance: 30ftx2 (11/15/22 1605)  Comments: Dependent to Stalin Johnson B AFOs. SPO2 97% HR 80BPM on 4L O2.  VCs and demo for [employer] safety with approach to chair.  (11/15/22 1605)  Stairs:     W/C mobility:         Occupational Therapy  Hand Dominance: Right  ADL  Feeding: Independent (11/14/22 1255)  Grooming: Supervision (11/14/22 1255)  UE Bathing: Stand by assistance (11/14/22 1255)  LE Bathing: Minimal assistance (11/14/22 1255)  UE Dressing: Stand by assistance (11/14/22 1255)  LE Dressing: Minimal assistance (11/14/22 1255)  Toileting: Minimal assistance (11/14/22 1255)  Additional Comments: Simulated ADLs as above, limited d/t fatigue and B AFOs not present (11/14/22 1255)  Toilet Transfers  Toilet Transfer: Unable to assess (11/14/22 1257)  Toilet Transfers Comments: Anticipate min A (11/14/22 1257)            Speech Language Pathology n/a      Current Conditions Requiring Inpatient Rehabilitation  Bowel/Bladder Dysfunction: Yes  Intervention Required = Frequent toileting  Risk for Medical/Clinical Complications = moderate  Skin Healing/Breakdown Risk: Yes  Intervention Required = Side to side turns  Risk for Medical/Clinical Complications = moderate  Nutrition/Hydration Deficiency: Yes  Intervention Required = Monitor I&Os and Check Labs  Risk for Medical/Clinical Complications = moderate  Medical Comorbidities: Yes  Intervention Required = DVT risk and CHF, AF  Risk for Medical/Clinical Complications = high    Rehab/Skilled Needs:   3 hours of Intensive Acute Rehab therapy daily, 5 days/week for a total of 900 minutes  PT Treatment Time:  1.5 hrs/day  OT Treatment Time: 1.5 hrs/day  Rehabilitation Nursing   Case management/Social work  Oxygen/CPAP/BiPAP  PICC/IV Medications    Cultural needs:   Values / Beliefs  Do You Have Any Ethnic, Cultural, Sacramental, or Spiritual Latter day Needs You Would Like Us To Be Aware of While You Are in the Hospital : No   Funding needs:   Potential Assistance Purchasing Medications: No     Expected Level of Improvement with Rehab  Assist for ADL Modified [place]  Assist for Transfers [place]  Assist for Gait Modified [place]    Patient's willingness to participate: Yes  Patient's ability to tolerate proposed care: Yes  Patient/Family Goals of Rehab (in patient's/family's own words): get stronger, breathe better    Anticipated Discharge Plan:  Home with   [address], RN PT OT Aide Social Work to be determined      Barriers to Discharge:  Home entry accessibility  Equipment needs  Caregiver availability  Inaccessible transportation  Resource availability      Rehab evaluation plan: Recommend Acute Inpatient Rehab, short stay  Rehabilitation Impairment Group Code: 3.3  Rehab Impairment Group: Neurologic: Guillain [place], Acute Inflammatory Demyelinating Polyneuropathy, Chronic Inflammatory Demyelinating Polyneuropathy  Estimated Length of Stay (days): 7  Rehab Diagnosis: Impaired mobility and ADL's due to CMT neuropathy   Reviewer's Signature: Electronically signed by Kylah Garcia RN on 11/16/22 at 12:51 PM EST      I have reviewed and concur with the above Preadmission Screening.    Rehab Admitting Doctor: Dr. Kasey Alexis DO

## 2022-11-15 NOTE — PROGRESS NOTES
Skylar Rodriguez  7/12/1932  female  Medical Record Number: [id number]      HPI: Patient with hx of intrabdominal abscess treated with percutaneous draiange on Invanz initially, changed to Zosyn for possible pseudomonas infection by Dr Yany Knott as outpatient - she came in for increased SOB. Per MR, she had been treated initially with Zosyn for weeks. She was off of abx for approx 2 weeks prior to starting the invanz, which started on 9/14/2022 x 30 days course. Dr Yany Knott changed the abx on 10/28/22 to Zosyn x 6 weeks course from that date. Per medical record, patient was not a surgical candidate.     + hx of Afib, AAA, CAD    Physical Exam:  Currently resting. Lays on her side. no acute distress  no new rashes   Neck is supple  Heart: S1 S2  Lungs: bilaterally clear  Abdomen: soft, ND  Extrem:no pain  Equal expressions    Labs: I have reviewed all lab results by electronic record, including most recent CBC, metabolic panel, and pertinent abnormalities were addressed from an infectious disease perspective. WBC trends are being monitored. Lab Results   Component Value Date/Time     11/14/2022 06:00 AM    K 3.0 11/14/2022 06:00 AM    K 3.2 07/24/2022 05:00 AM    CL 96 11/14/2022 06:00 AM    CO2 36 11/14/2022 06:00 AM    BUN 8 11/14/2022 06:00 AM    CREATININE 0.46 11/14/2022 06:00 AM    GLUCOSE 109 11/14/2022 06:00 AM    CALCIUM 8.5 11/14/2022 06:00 AM      Lab Results   Component Value Date    WBC 7.2 11/14/2022    HGB 11.8 (L) 11/14/2022    HCT 37.1 11/14/2022    MCV 83.4 11/14/2022     11/14/2022       Radiology:   I have reviewed imaging results per electronic record and most pertinent abnormalities are being addressed from an infectious disease standpoint. Last CT was 10/19/22 which still showed large abscess.      Culture no growth on 11/2 and 8/16  Surgical cx with strep viridans on 7/21/22  Estimated Creatinine Clearance: 73 mL/min (A) (based on SCr of 0.46 mg/dL (L)).    Assessment:  Intraabdominal abscess - large and persistent. Now on Zosyn. Not a surgical candidate per MR    Plan:  Stop date for the abx will be December 5, 2022. Follow up that week in the ID clinic please.    Weekly CBC, BMP, CRP      Avis Casas D.O.

## 2022-11-15 NOTE — PROGRESS NOTES
GI CONSULT CALLED TO DR Tavo Alonso Electronically signed by Florentin Jurado on 11/15/2022 at 11:13 AM

## 2022-11-15 NOTE — PROGRESS NOTES
Physical Therapy Med Surg Daily Treatment Note  [facility]  [id number]       NAME: Ev Rodriguez  : 1932 (80 y.o.)  MRN: [id number]  CODE STATUS: Full Code    Date of Service: 11/15/2022    Patient Diagnosis(es): Acute diastolic HF (heart failure) ([facility]) [I50.31]  CHF (congestive heart failure), NYHA class I, acute on chronic, combined ([facility] [address] [I50.43]  Dyspnea, unspecified type [K28.13]  Systolic congestive heart failure, unspecified HF chronicity ([facility] [address] [I50.20]   Chief Complaint   Patient presents with    Shortness of Breath     X2 days     Patient Active Problem List    Diagnosis Date Noted    A-fib ([facility] [address]     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     Acute diastolic HF (heart failure) ([facility] [address] 2022    CHF (congestive heart failure), NYHA class I, acute on chronic, combined ([facility] [address] 2022    Difficulty in walking 2022    Muscle weakness (generalized) 2022    Gastro-esophageal reflux disease without esophagitis 2022    Intracardiac thrombosis, 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    Change or removal of drains 2022    Adnexal mass 2022    Intra-abdominal abscess ([address] 2022    Aortic thrombus ([facility] [address] 2022    Hx of drainage of abscess 2022    Abdominal pain 2022    Streptococcus viridans infection 2022    Abdominal mass 2022    Right lower quadrant abdominal pain 2022    Hyponatremia 2022    Hypokalemia 2022    Anemia 2022    CHF (congestive heart failure) ([address] 2022    Hypothyroid 2022    Central retinal vein occlusion, left eye, stable 2021    Lumbar stenosis with neurogenic claudication 2016    Chronic diastolic CHF (congestive heart failure) ([facility] [address] 2022    Acute cystitis with hematuria 2021    Acute on chronic combined systolic (congestive) and diastolic (congestive) heart failure ([facility] [address] 2021    Pleural effusion 02/08/2021    Hypoxia     Acute pulmonary edema (HCC)     Gait abnormality 01/27/2021    Acute on chronic combined systolic and diastolic CHF (congestive heart failure) ([address] 01/25/2021    Essential hypertension 02/16/2018    Shortness of breath     Vertigo     Thoracic aortic aneurysm without rupture 06/23/2017    Descending aortic aneurysm ([address] 06/23/2017    Osteoarthritis     Myelopathy ([address]     Headache     Depression     Acquired scoliosis 06/02/2016    Osteoporosis 06/02/2016    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    Excess weight 06/02/2016    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/02/2016        Past Medical History:   Diagnosis Date    Ascending aortic aneurysm 6/23/2017    Charcot Arleen Tooth muscular atrophy     CHF (congestive heart failure) (HCC)     Chronic diastolic CHF (congestive heart failure) ([address] 4/1/2022    Depression     Descending aortic aneurysm ([address] 6/23/2017    Essential hypertension 2/16/2018    Headache     HTN (hypertension)     Impaired mobility and activities of daily living     Lumbar stenosis with neurogenic claudication     Myelopathy (HCC)     Osteoarthritis      Past Surgical History:   Procedure Laterality Date    IR INS PICC VAD W SQ PORT GREATER THAN 5  9/23/2022    IR INS PICC VAD W SQ PORT GREATER THAN 5 9/23/2022 [facility] SPECIAL PROCEDURE    JOINT REPLACEMENT Bilateral     knees    OTHER SURGICAL HISTORY Right 07/21/2022    cat scan guided abdominal mass aspiration with drain placement by Dr. Lia Husain Left 02/25/2021    LEFT PLEURAL CATHETER INSERTION performed by Jazmyne Null MD at [place] [address] Left 01/29/2021    total of 755 cc removed per Dr Kristofer Johnson specimen sent to lab            Restrictions:  Restrictions/Precautions: Fall Risk    SUBJECTIVE:   Subjective: Pt reports \"no walk 6 days\"    Pain  Pain: Pt denies pain    OBJECTIVE:        Bed mobility  Bed Mobility Comments: Pt sitting up at EOB start of tx, transfer to chair at end of tx    Transfers  Sit to Stand: Stand by assistance  Stand to Sit: Stand by assistance  Bed to Chair: Stand by assistance  Comment: VCs for handplacement to increase ease and safety. Ambulation  Surface: Level tile  Device: Rolling Walker  Other Apparatus: AFO; Left;Right;O2  Assistance: Stand by assistance  Quality of Gait: Assist to manage O2 line. Gait Deviations: Slow Lea;Decreased step length  Distance: 30ftx2  Comments: Dependent to Don B AFOs. SPO2 97% HR 80BPM on 4L O2.  VCs and demo for [employer] safety with approach to chair. ASSESSMENT   Assessment: Able to initiate gait training with B AFOs. Pt attempted but dependent to ramila AFOs. SBA for gait d/t assist for awareness and management of O2 line/  Maintaining SPO2 levels WNL. Progressed transfers to SBA level with VCs for safety. Pt with limited English comprehension however able to follow demonstration. Discharge Recommendations:  Continue to assess pending progress, Patient would benefit from continued therapy after discharge     Goals  Long Term Goals  Long Term Goal 1: Pt to complete bed mobility with indep  Long Term Goal 2: Pt to complete transfers with indep  Long Term Goal 3: Pt to ambulate 50-150ft with LRD and Supervision  Long Term Goal 4: Pt to complete 2 steps with HR and CGA    PLAN    General Plan: 1 time a day 3-6 times a week        [facility] (6 CLICK) BASIC MOBILITY  AM-PAC Inpatient Mobility Raw Score : 18     Therapy Time   Individual   Time In 1540   Time Out 1558   Minutes 18   Transfers 8  Gait [address] [place], [place], 11/15/22 at 4:11 PM     Definitions for assistance levels  Independent = pt does not require any physical supervision or assistance from another person for activity completion. Device may be needed.   Stand by assistance = pt requires verbal cues or instructions from another person, close to but not touching, to perform the activity  Minimal assistance= pt performs 75% or more of the activity; assistance is required to complete the activity  Moderate assistance= pt performs 50% of the activity; assistance is required to complete the activity  Maximal assistance = pt performs 25% of the activity; assistance is required to complete the activity  Dependent = pt requires total physical assistance to accomplish the task

## 2022-11-15 NOTE — CARE COORDINATION
AWAITING PT EVAL.   SPOKE WITH [facility], IF PT RETURNS HOME PT WILL NEED ALL NEW [facility] ORDER(SN-IV'S, PT/OT/AIDE)

## 2022-11-15 NOTE — PROGRESS NOTES
Progress Note  Patient: Martha Fraction  Unit/Bed: C503/R012-91  YOB: 1932  MRN: [id number]  Acct: [de-identified]   Admitting Diagnosis: Acute diastolic HF (heart failure) ([facility]) [I50.31]  CHF (congestive heart failure), NYHA class I, acute on chronic, combined ([facility] [I50.43]  Dyspnea, unspecified type [O24.94]  Systolic congestive heart failure, unspecified HF chronicity ([facility] [I50.20]  Admit Date:  11/11/2022  Hospital Day: 4    Chief Complaint: CHF    Histories:  Past Medical History:   Diagnosis Date    Ascending aortic aneurysm 6/23/2017    Charcot Arleen Tooth muscular atrophy     CHF (congestive heart failure) (Former[facility])     Chronic diastolic CHF (congestive heart failure) ([facility] [address] 4/1/2022    Depression     Descending aortic aneurysm ([facility] 6/23/2017    Essential hypertension 2/16/2018    Headache     HTN (hypertension)     Impaired mobility and activities of daily living     Lumbar stenosis with neurogenic claudication     Myelopathy (HCC)     Osteoarthritis      Past Surgical History:   Procedure Laterality Date    IR INS PICC VAD W SQ PORT GREATER THAN 5  9/23/2022    IR INS PICC VAD W SQ PORT GREATER THAN 5 9/23/2022 [facility] SPECIAL PROCEDURE    JOINT REPLACEMENT Bilateral     knees    OTHER SURGICAL HISTORY Right 07/21/2022    cat scan guided abdominal mass aspiration with drain placement by Dr. Mindy Muhammad Left 02/25/2021    LEFT PLEURAL CATHETER INSERTION performed by Suze Pride MD at [place] [id number] Left 01/29/2021    total of 755 cc removed per Dr Rosalina Coon specimen sent to lab     Family History   Problem Relation Age of Onset    Arthritis Mother     Arthritis Father     High Blood Pressure Father      Social History     Socioeconomic History    Marital status:      Number of children: 2   Tobacco Use    Smoking status: Never    Smokeless tobacco: Never   Vaping Use    Vaping Use: Never used   Substance and Sexual Activity    Alcohol use: No     Alcohol/week: 0.0 standard drinks    Drug use: No    Sexual activity: Not Currently   Social History Narrative    , Lives With: Alone, son lives down the street, dtr is in the area    Type of Home: [facility]  in [address]: One level    Home Access: Stairs to enter with rails- Number of Steps: 2- Rails: Both    Bathroom Shower/Tub: Tub/Shower unit, Bathroom Equipment: Grab bars in shower, Shower chair    Home Equipment: Rolling walker, Cane(Pt infrequemtly uses DME for ambulation and prefers to furniture walk in home)    ADL Assistance: Independent, [address] [facility] Responsibilities: Yes    Ambulation Assistance: Independent, Transfer Assistance: Independent    Additional Comments: Son stops over 2 times daily           Subjective/HPI no acute events overnight. Diuresed well. Sitting up in bed eating. Feels better. Breathing is better. Less abd discomfort. On 4L O2 no fever    EKG:SR 67        Review of Systems:   Review of Systems   Constitutional: Negative. Negative for diaphoresis and fatigue. HENT: Negative. Eyes: Negative. Respiratory:  Negative for cough, chest tightness, wheezing and stridor. Cardiovascular:  Negative for chest pain and palpitations. Gastrointestinal:  Positive for abdominal pain. Negative for blood in stool and nausea. Genitourinary: Negative. Musculoskeletal: Negative. Skin: Negative. Neurological:  Positive for weakness. Negative for dizziness, syncope and light-headedness. Hematological: Negative. Psychiatric/Behavioral: Negative. Physical Examination:    /82   Pulse 76   Temp 97.7 °F (36.5 °C) (Oral)   Resp 16   Ht 4' 11\" (1.499 m)   Wt 151 lb 1.6 oz (68.5 kg)   SpO2 97%   BMI 30.52 kg/m²    Physical Exam   Constitutional: No distress. She appears chronically ill and acutely ill. HENT:   Normal cephalic and Atraumatic   Eyes: Pupils are equal, round, and reactive to light.    Neck: Thyroid normal. No JVD present. No neck adenopathy. No thyromegaly present. Cardiovascular: Normal rate, regular rhythm, intact distal pulses and normal pulses. Murmur heard. Pulmonary/Chest: Effort normal and breath sounds normal. She has no wheezes. She has no rales. She exhibits no tenderness. Abdominal: Soft. Bowel sounds are normal. There is no abdominal tenderness. Musculoskeletal:         General: No tenderness or edema. Normal range of motion. Cervical back: Normal range of motion and neck supple. Neurological: She is alert and oriented to person, place, and time. Skin: Skin is warm. No cyanosis. Nails show no clubbing.      LABS:  CBC:   Lab Results   Component Value Date/Time    WBC 7.2 11/14/2022 06:00 AM    RBC 4.45 11/14/2022 06:00 AM    HGB 11.8 11/14/2022 06:00 AM    HCT 37.1 11/14/2022 06:00 AM    MCV 83.4 11/14/2022 06:00 AM    MCH 26.4 11/14/2022 06:00 AM    MCHC 31.7 11/14/2022 06:00 AM    RDW 17.3 11/14/2022 06:00 AM     11/14/2022 06:00 AM     CBC with Differential:    Lab Results   Component Value Date/Time    WBC 7.2 11/14/2022 06:00 AM    RBC 4.45 11/14/2022 06:00 AM    HGB 11.8 11/14/2022 06:00 AM    HCT 37.1 11/14/2022 06:00 AM     11/14/2022 06:00 AM    MCV 83.4 11/14/2022 06:00 AM    MCH 26.4 11/14/2022 06:00 AM    MCHC 31.7 11/14/2022 06:00 AM    RDW 17.3 11/14/2022 06:00 AM    BANDSPCT 2 07/24/2022 05:00 AM    METASPCT 2 07/24/2022 05:00 AM    LYMPHOPCT 7.9 11/14/2022 06:00 AM    MONOPCT 10.8 11/14/2022 06:00 AM    BASOPCT 0.9 11/14/2022 06:00 AM    MONOSABS 0.8 11/14/2022 06:00 AM    LYMPHSABS 0.6 11/14/2022 06:00 AM    EOSABS 0.4 11/14/2022 06:00 AM    BASOSABS 0.1 11/14/2022 06:00 AM     CMP:    Lab Results   Component Value Date/Time     11/14/2022 06:00 AM    K 3.0 11/14/2022 06:00 AM    K 3.2 07/24/2022 05:00 AM    CL 96 11/14/2022 06:00 AM    CO2 36 11/14/2022 06:00 AM    BUN 8 11/14/2022 06:00 AM    CREATININE 0.46 11/14/2022 06:00 AM GFRAA >60.0 10/11/2022 10:30 AM    LABGLOM >60.0 11/14/2022 06:00 AM    GLUCOSE 109 11/14/2022 06:00 AM    PROT 6.4 11/11/2022 12:30 PM    LABALBU 3.0 11/11/2022 12:30 PM    CALCIUM 8.5 11/14/2022 06:00 AM    BILITOT 0.5 11/11/2022 12:30 PM    ALKPHOS 61 11/11/2022 12:30 PM    AST 49 11/11/2022 12:30 PM    ALT 44 11/11/2022 12:30 PM     BMP:    Lab Results   Component Value Date/Time     11/14/2022 06:00 AM    K 3.0 11/14/2022 06:00 AM    K 3.2 07/24/2022 05:00 AM    CL 96 11/14/2022 06:00 AM    CO2 36 11/14/2022 06:00 AM    BUN 8 11/14/2022 06:00 AM    LABALBU 3.0 11/11/2022 12:30 PM    CREATININE 0.46 11/14/2022 06:00 AM    CALCIUM 8.5 11/14/2022 06:00 AM    GFRAA >60.0 10/11/2022 10:30 AM    LABGLOM >60.0 11/14/2022 06:00 AM    GLUCOSE 109 11/14/2022 06:00 AM     Magnesium:    Lab Results   Component Value Date/Time    MG 1.9 11/15/2022 05:42 AM     Troponin:    Lab Results   Component Value Date/Time    TROPONINI <0.010 11/11/2022 12:30 PM        Active Hospital Problems    Diagnosis Date Noted    Acute diastolic HF (heart failure) ([facility] [I50.31] 11/11/2022     Priority: Medium    CHF (congestive heart failure), NYHA class I, acute on chronic, combined ([facility] [I50.43] 11/11/2022     Priority: Medium        Assessment/Plan:  Acute on chronic DHF- AM labs ordered- P. She appears Euvolemic. Will change to PO diuretics. Wean O2. Echo EF 40 from prior 60. Likely related to AF. Added Entresto. Dc ARB  Keep on Telemetry  AF- Xarelto. Rate control added DIg. In SR rate 67  Ascending AO Aneurysm 5.3 cm - no plans for surgery per pt and daughter. She has Mural Thrombus. - on Xarelto  HTN stable   Abd Abscess and discomfort - on iv ABX. Continued discomfort. CAD Moderate LAD - no CP reported.    Elevated D Dimer- CT Chest - negative PE  PTOT Rehab eval       Electronically signed by Riana Niño MD on 11/15/2022 at 7:54 AM

## 2022-11-15 NOTE — CONSULTS
Inpatient consult to GI  Consult performed by: Linda Peters MD  Consult ordered by: Ion Hart MD        Patient Name: Aminta Virgen Date: 2022 12:04 PM  MR #: [id number]  : 1932    Attending Physician: Ion Hart MD  Reason for consult: post prandial epigastric pain    History of Presenting Illness:      Shantelle Malone is a 80 y.o. female on hospital day 4 with a history of intra-abdominal abscess, A fib on xaralto, CHF, hypertension, osteoarthritis, myelopathy. Past surgical history significant for bilateral knee replacement, CT-guided abdominal mass aspiration with PICC line and long-term antibiotics, thoracentesis. Family history is negative for GI malignancies. Social history no nicotine, EtOH or illicit drug use. History Obtained From:  patient, electronic medical record    GI consult for postprandial epigastric pain-patient was admitted from nursing home with shortness of breath and CHF. Patient reports epigastric pain, typically occurs after eating, symptoms are intermittent in nature, started recently, no NSAIDs. No melena or hematochezia. Was initiated on PPI and Carafate. No history of EGD. No dysphagia. No unintentional weight loss. At baseline wears oxygen 2-3 L NC. Of note pt is currently on long term course of Atbx for abdominal abscess not amenable to surgical treatment. Noted recent CT imaging with no gallbladder pathology, labs reviewed- no leukocytosis, mild normocytic anemia with hemoglobin of 11.8, noted normal total bilirubin and mild elevation of transaminases.    History:      Past Medical History:   Diagnosis Date    Ascending aortic aneurysm 2017    Charcot Arleen Tooth muscular atrophy     CHF (congestive heart failure) (HCC)     Chronic diastolic CHF (congestive heart failure) ([facility] [address] 2022    Depression     Descending aortic aneurysm ([facility] [address] 2017    Essential hypertension 2018    Headache     HTN (hypertension) Impaired mobility and activities of daily living     Lumbar stenosis with neurogenic claudication     Myelopathy [facility])     Osteoarthritis      Past Surgical History:   Procedure Laterality Date    IR INS PICC VAD W SQ PORT GREATER THAN 5  9/23/2022    IR INS PICC VAD W SQ PORT GREATER THAN 5 9/23/2022 [facility] SPECIAL PROCEDURE    JOINT REPLACEMENT Bilateral     knees    OTHER SURGICAL HISTORY Right 07/21/2022    cat scan guided abdominal mass aspiration with drain placement by Dr. Lia Husain Left 02/25/2021    LEFT PLEURAL CATHETER INSERTION performed by Jazmyne Null MD at [place] [address] Left 01/29/2021    total of 755 cc removed per Dr Kristofer Johnson specimen sent to lab     Family History  Family History   Problem Relation Age of Onset    Arthritis Mother     Arthritis Father     High Blood Pressure Father      [] Unable to obtain due to ventilated and/ or neurologic status  Social History     Socioeconomic History    Marital status:       Spouse name: Not on file    Number of children: 2    Years of education: Not on file    Highest education level: Not on file   Occupational History    Not on file   Tobacco Use    Smoking status: Never    Smokeless tobacco: Never   Vaping Use    Vaping Use: Never used   Substance and Sexual Activity    Alcohol use: No     Alcohol/week: 0.0 standard drinks    Drug use: No    Sexual activity: Not Currently   Other Topics Concern    Not on file   Social History Narrative    , Lives With: Alone, son lives down the street, dtr is in the area    Type of Home: [address]: One level    Home Access: Stairs to enter with rails- Number of Steps: 2- Rails: Both    Bathroom Shower/Tub: Tub/Shower unit, Bathroom Equipment: Grab bars in shower, Shower chair    Home Equipment: Rolling walker, Cane(Pt infrequemtly uses DME for ambulation and prefers to furniture walk in home)    ADL Assistance: Chanelle Davis Assistance: Independent    Homemaking Responsibilities: Yes    Ambulation Assistance: Independent, Transfer Assistance: Independent    Additional Comments: Son stops over 2 times daily         Social Determinants of Health     Financial Resource Strain: Not on file   Food Insecurity: Not on file   Transportation Needs: Not on file   Physical Activity: Not on file   Stress: Not on file   Social Connections: Not on file   Intimate Partner Violence: Not on file   Housing Stability: Not on file      [] Unable to obtain due to ventilated and/ or neurologic status    Home Medications:      Medications Prior to Admission: Lactobacillus TABS, Take by mouth daily  piperacillin-tazobactam (ZOSYN) 4-0.5 GM per 100ML IVPB, Infuse 13.5 mg intravenously daily Pt  get 13.5 grams  over 24 hours  via easy pump  Lactobacillus Acidophilus POWD, by Does not apply route  rivaroxaban (XARELTO) 15 MG TABS tablet, Take 1 tablet by mouth daily  meclizine (ANTIVERT) 25 MG tablet, Take by mouth daily  acetaminophen (TYLENOL) 325 MG tablet, Take 650 mg by mouth every 6 hours as needed for Pain  Vitamin D (CHOLECALCIFEROL) 50 MCG (2000 UT) TABS tablet, Take 1 tablet by mouth Daily with supper (Patient taking differently: Take 2,000 Units by mouth Daily with supper Indications: Nutritional Support)  potassium chloride (KLOR-CON M) 20 MEQ extended release tablet, Take 20 mEq by mouth daily (with breakfast) Indications: Nutritional Support  furosemide (LASIX) 20 MG tablet, TAKE 1 TABLET DAILY (Patient taking differently: Take 20 mg by mouth daily Indications: Congestive Heart Failure)  pantoprazole (PROTONIX) 40 MG tablet, TAKE 1 TABLET DAILY (Patient taking differently: Take 40 mg by mouth daily Indications: Ulcer)  carvedilol (COREG) 6.25 MG tablet, TAKE 1 TABLET TWICE A DAY (Patient taking differently: Take 6.25 mg by mouth 2 times daily Indications: High Blood Pressure Disorder)  nitroGLYCERIN (NITROSTAT) 0.4 MG SL tablet, up to max of 3 total doses. If no relief after 1 dose, call 911. (Patient taking differently: Place 0.4 mg under the tongue every 5 minutes as needed Indications: Chest Pain up to max of 3 total doses. If no relief after 1 dose, call 911.)  ferrous sulfate (IRON 325) 325 (65 Fe) MG tablet, Take 1 tablet by mouth 2 times daily (with meals) (Patient taking differently: Take 325 mg by mouth 2 times daily (with meals) Indications: Anemia)  [DISCONTINUED] budesonide-formoterol (SYMBICORT) 160-4.5 MCG/ACT AERO, Inhale 2 puffs into the lungs 2 times daily (Patient taking differently: Inhale 2 puffs into the lungs in the morning and 2 puffs in the evening.  Indications: Difficulty Breathing.)  Carboxymethylcellulose Sodium (EYE DROPS OP), Apply 1 drop to eye as needed (Dry eyes) Indications: Systane   Boswellia-Glucosamine-Vit D (OSTEO BI-FLEX ONE PER DAY) TABS, Take 1 tablet by mouth daily Indications: Nutritional Support  Multiple Vitamins-Minerals (CENTRUM SILVER ULTRA WOMENS) TABS, Take 1 tablet by mouth daily Indications: Nutritional Support  vitamin B-12 (CYANOCOBALAMIN) 1000 MCG tablet, Take 1,000 mcg by mouth daily Indications: Nutritional Support  citalopram (CELEXA) 20 MG tablet, Take 20 mg by mouth daily Indications: Depression  SYNTHROID 88 MCG tablet, Take 88 mcg by mouth daily Indications: Underactive Thyroid    Current Hospital Medications:   Scheduled Meds:   furosemide  40 mg Oral Daily    sucralfate  1 g Oral 4 times per day    piperacillin-tazobactam  3,375 mg IntraVENous Q8H    sacubitril-valsartan  1 tablet Oral BID    potassium chloride  20 mEq Oral BID    sodium chloride flush  5-40 mL IntraVENous 2 times per day    digoxin  125 mcg Oral Daily    Vitamin D  2,000 Units Oral Dinner    rivaroxaban  15 mg Oral Daily with breakfast    levothyroxine  88 mcg Oral Daily    ferrous sulfate  325 mg Oral BID WC    carvedilol  6.25 mg Oral BID    citalopram  20 mg Oral Daily    pantoprazole  40 mg Oral Daily    lactobacillus acidophilus  4 tablet Oral TID     Continuous Infusions:   sodium chloride       PRN Meds:.sodium chloride flush, sodium chloride, ondansetron **OR** ondansetron, polyethylene glycol, acetaminophen **OR** acetaminophen   sodium chloride        Allergies: Allergies   Allergen Reactions    Latex     Tape [Adhesive Tape]       Review of Systems:       [x] CV, Resp, Neuro, , and all other systems reviewed and negative other than listed in HPI. Objective Findings:     Vitals:   Vitals:    11/14/22 2058 11/14/22 2115 11/15/22 0857 11/15/22 1113   BP: 124/82  107/67 107/67   Pulse: 56 76 78 78   Resp: 16  16    Temp: 97.7 °F (36.5 °C)  97.7 °F (36.5 °C)    TempSrc: Oral  Oral    SpO2: 97%  98%    Weight:       Height:            Physical Examination:  General: alert  HEENT: Normocephalic, no scleral icterus. Neck: No JVD. Heart: Regular, no murmur, no rub/gallop. No RV heave. Lungs: Clear to ascultation, no rales/wheezing/rhonchi. Good chest wall excursion. Abdomen: Appearance:, no Distension , Soft , no tenderness , Scars , Bowel sounds normal  Extremities: No clubbing/cyanosis, no edema. Skin: Warm, dry, normal turgor, no rash, no bruise, no petichiae. Neuro: No myoclonus or tremor.    Psych: Normal affect    Results/ Medications reviewed 11/15/2022, 1:48 PM     Laboratory, Microbiology, Pathology, Radiology, Cardiology, Medications and Transcriptions reviewed  Scheduled Meds:   furosemide  40 mg Oral Daily    sucralfate  1 g Oral 4 times per day    piperacillin-tazobactam  3,375 mg IntraVENous Q8H    sacubitril-valsartan  1 tablet Oral BID    potassium chloride  20 mEq Oral BID    sodium chloride flush  5-40 mL IntraVENous 2 times per day    digoxin  125 mcg Oral Daily    Vitamin D  2,000 Units Oral Dinner    rivaroxaban  15 mg Oral Daily with breakfast    levothyroxine  88 mcg Oral Daily    ferrous sulfate  325 mg Oral BID WC    carvedilol  6.25 mg Oral BID    citalopram  20 mg Oral Daily pantoprazole  40 mg Oral Daily    lactobacillus acidophilus  4 tablet Oral TID     Continuous Infusions:   sodium chloride         Recent Labs     11/13/22  0545 11/14/22  0600   WBC 6.4 7.2   HGB 11.0* 11.8*   HCT 33.6* 37.1   MCV 82.5 83.4    184     Recent Labs     11/13/22  0547 11/14/22  0600 11/15/22  0542    141 140   K 3.2* 3.0* 3.9    96 95   CO2 36* 36* 26   BUN 11 8 10   CREATININE 0.51 0.46* 0.55     No results for input(s): AST, ALT, ALB, BILIDIR, BILITOT, ALKPHOS in the last 72 hours. No results for input(s): LIPASE, AMYLASE in the last 72 hours. No results for input(s): PROT, INR in the last 72 hours. CTA CHEST W WO CONTRAST PE Eval    Result Date: 11/11/2022  EXAMINATION: CTA OF THE CHEST WITH AND WITHOUT CONTRAST 11/11/2022 1:53 pm TECHNIQUE: CTA of the chest was performed before and after the administration of intravenous contrast.  Multiplanar reformatted images are provided for review. MIP images are provided for review. Automated exposure control, iterative reconstruction, and/or weight based adjustment of the mA/kV was utilized to reduce the radiation dose to as low as reasonably achievable. COMPARISON: CT chest 06/22/2022 HISTORY: ORDERING SYSTEM PROVIDED HISTORY: PE TECHNOLOGIST PROVIDED HISTORY: Reason for exam:->PE Decision Support Exception - unselect if not a suspected or confirmed emergency medical condition->Emergency Medical Condition (MA) What reading provider will be dictating this exam?->CRC FINDINGS: Pulmonary Arteries: Pulmonary arteries are adequately opacified for evaluation. No evidence of intraluminal filling defect to suggest pulmonary embolism. Main pulmonary artery is normal in caliber. Mediastinum: There is severe cardiomegaly with particular enlargement of the right ventricle and atrium. Reflux of injected contrast is seen in the IVC and dilated hepatic veins suggestive of significant right heart dysfunction. No pericardial effusion is noted. Moderate coronary artery calcification is noted. Stable thoracic aortic aneurysm is seen. The ascending aorta measures up to 5.4 cm, previously 5.4 cm. The descending aorta measures 4.6 cm, previously 4.6 cm. Lungs/Pleura: Bilateral trace pleural effusions are seen. Smooth interlobular septal thickening with ground-glass opacity is noted dependently. Bilateral lower lobe subsegmental atelectasis is also seen. Lingula subsegmental atelectasis noted. Upper Abdomen: Perihepatic ascites is noted. Soft Tissues/Bones: No acute bone or soft tissue abnormality. No evidence of pulmonary embolism. Severe cardiomegaly with evidence of congestive heart failure manifesting as mild edema and bilateral trace pleural effusions as well as significant right heart dysfunction including partially visualized perihepatic ascites. CT GUIDED NEEDLE PLACEMENT    1. Successful aspiration of left lower quadrant abdominal abscess. 2.Only a few drops of very thick gelatinous serosanguineous fluid with extensive solid debris was able to be aspirated. Due to this a drain was unable to be placed. Patient will be seen general surgery for evaluation and possible surgical intervention. HISTORY: Meghna Maria is a Female of 80 years age. DIAGNOSIS:  K65.1 Intra-abdominal abscess (HCC) ICD10 COMPARISON: None available. CT Dose-Length Product (estimate related to radiation exposure from this exam):  710.1  mGy*cm. PROCEDURE: Following the discussion of the procedure, alternatives, risks versus benefits, informed consent was obtained from the patient. Specifically, risks of after-biopsy pain at the site, rare possibility of excessive hemorrhage, infection, injury to the adjacent organs were discussed and the patient verbalized understanding. Pre-procedure evaluation confirmed that the patient was an appropriate candidate for conscious sedation. Adequate sedation was maintained during the entire procedure.   Vital signs, pulse oximetry, and response to verbal commands were monitored and recorded by the nurse throughout the procedure and the recovery period. Medical information was entered in the medical record including the medications and dosages used. The patient returned to baseline neurologic and physiologic status prior to leaving the department. No immediate sedation related complications were noted. Medication for conscious sedation was administered via IV route. 45 minutes of conscious sedation was provided. Following universal protocol, patient and site verification was performed with a \"timeout\" prior to the procedure. The patient was placed on the CT table in supine  position and the right lateral abdomen area was prepped and draped in usual sterile fashion. Using the usual sterile conditions, lidocaine and CT guidance, the complex septated abscess in the right lower  quadrant of the abdomen was accessed using a 4 Samoan aspiration catheter. After confirmation of appropriate localization of the needle, aspiration was attempted, though the abscess was mostly solid with very thick gelatinous material with extensive solid debris and only a few drops were aspirated. The drainage catheter was removed and hemostasis was achieved by direct digital compression. The patient tolerated the procedure well. No immediate complications identified. The patient left the CT suite in supine position to the recovery room in stable condition. Total local anesthetic used: lidocaine, approximately 8 mL. Estimated blood loss:  Negligible. The patient was observed in the recovery room for two hours after the procedure and discharged in stable condition. CT ABDOMEN PELVIS W IV CONTRAST Additional Contrast? Oral    1. The right-sided abscess drain has been completely pulled out of the abscess and now lies in the right abdominal subcutaneous soft tissues.  2.The right lower abdominal abscess has increased in size when compared to prior study dating September 14 likely due to accumulation of infectious fluid due to displacement of the drainage catheter. 3.Again seen is a large cystic mass in the lower pelvis. COMPARISON: September 14, 2022 DIAGNOSIS: Right lower abdomen abscess COMMENTS: None TECHNIQUE: Spiral scanning of the chest, abdomen and pelvis was performed after  administration of intravenous contrast. CT Dose-Length Product (estimate related to radiation exposure from this exam):  314.66  mGy*cm. FINDINGS: ABDOMEN The visualized portion of the lung bases demonstrates a small effusion in the left thoracic cavity. The liver is of normal size and attenuation without focal lesions. The spleen is unremarkable. Kidneys demonstrate prompt enhancement and excretion of contrast without signs of hydronephrosis or focal mass. The pancreas and adrenals are unremarkable. The upper abdominal bowel loops appear normal. Again seen is ectasia of the visualized abdominal aorta and tortuosity, unchanged from prior. 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 There are no signs of upper abdominal fluid collections. PELVIS  Again seen is a large abscess in the right lower abdomen/pelvis abutting the cecum now measuring 6.6 x 9.4 in transverse and AP dimensions, previously measuring 6 x 6.7 and AP and transverse dimensions. The previously seen abscess drain has now been pulled out of the abscess and lies in the subcutaneous soft tissues and is not contributing to any drainage of the abscess consistent with abscess enlargement. Again seen is a large cystic mass in the pelvis. XR CHEST PORTABLE    Result Date: 11/11/2022  EXAMINATION: ONE XRAY VIEW OF THE CHEST 11/11/2022 12:42 pm COMPARISON: None. HISTORY: ORDERING SYSTEM PROVIDED HISTORY: sob TECHNOLOGIST PROVIDED HISTORY: Reason for exam:->sob What reading provider will be dictating this exam?->CRC FINDINGS: The heart is enlarged.   Bilateral aspect airspace disease is noted which could represent CHF or less likely pneumonia. There is no consolidation seen within the right lung base. There is left lung base consolidation which is favored to represent either atelectasis or a pleural effusion. Note is made of a right-sided PICC line. The catheter tip is at the level of the clavicle head. Cardiomegaly with multifocal bilateral airspace disease favored to represent CHF Left lung base consolidation which could represent atelectasis, pleural effusion or less likely pneumonia. .        Impression:   79 y/o female admitted with CHF and SOB, now with c/o epigastric pain after meals. symptoms are intermittent in nature, started recently, no NSAIDs. No melena or hematochezia. Was initiated on PPI and Carafate. No history of EGD. No dysphagia. No unintentional weight loss. At baseline wears oxygen 2-3 L NC. Of note pt is currently on long term course of Atbx for abdominal abscess not amenable to surgical treatment. Noted recent CT imaging with no gallbladder pathology, labs reviewed- no leukocytosis, mild normocytic anemia with hemoglobin of 11.8, noted normal total bilirubin and mild elevation of transaminases. Ddx Post prandial epigastric pain-likely multifactorial possibly attributed to intra-abdominal abscess, gastritis, peptic ulcer disease. Plan:   -Continue course of care  -Agree with PPI and Carafate  -observe clinical course  -Ongoing evaluation for endoscopy pending clinical course, no immediate need noted patient is tolerating diet and is on Xarelto this would be diagnostic only. Comments: Thank you for allowing us to participate in the care of this patient. Will continue to follow. Please call if questions or concerns arise.     Electronically signed by Yoandy Cameron MD on 11/15/2022 at 1:48 PM    Please note this report has been partially produced using speech recognition software and may cause contain errors related to that system including grammar, punctuation and spelling as well as words and phrases that may seem inappropriate. If there are questions or concerns please feel free to contact me to clarify.

## 2022-11-15 NOTE — PROGRESS NOTES
Progress Note  Date:11/15/2022       NISD:[id number]  Patient Name:Darryl Rodriguez     YOB: 1932     Age:89 y.o. Subjective    Subjective:  Symptoms:  She reports malaise and weakness. No shortness of breath, cough, chest pain, headache, chest pressure, anorexia, diarrhea or anxiety. Diet:  No nausea or vomiting. Review of Systems   Respiratory:  Negative for cough and shortness of breath. Cardiovascular:  Negative for chest pain. Gastrointestinal:  Negative for anorexia, diarrhea, nausea and vomiting. Neurological:  Positive for weakness. Objective         Vitals Last 24 Hours:  TEMPERATURE:  Temp  Av.6 °F (36.4 °C)  Min: 97.3 °F (36.3 °C)  Max: 97.7 °F (36.5 °C)  RESPIRATIONS RANGE: Resp  Av  Min: 16  Max: 16  PULSE OXIMETRY RANGE: SpO2  Av.3 %  Min: 97 %  Max: 98 %  PULSE RANGE: Pulse  Av  Min: 56  Max: 120  BLOOD PRESSURE RANGE: Systolic (11QKE), XUK:782 , Min:107 , XQN:127   ; Diastolic (59PDH), MARIA INES:36, Min:66, Max:97    I/O (24Hr): Intake/Output Summary (Last 24 hours) at 11/15/2022 0904  Last data filed at 11/15/2022 0100  Gross per 24 hour   Intake 360 ml   Output 2000 ml   Net -1640 ml       Objective:  General Appearance:  Comfortable, well-appearing and in no acute distress. Vital signs: (most recent): Blood pressure 107/67, pulse 78, temperature 97.7 °F (36.5 °C), temperature source Oral, resp. rate 16, height 4' 11\" (1.499 m), weight 151 lb 1.6 oz (68.5 kg), SpO2 98 %. HEENT: Normal HEENT exam.    Lungs:  Breath sounds clear to auscultation. Heart: S1 normal and S2 normal.    Abdomen: Abdomen is soft. Bowel sounds are normal.   There is no epigastric area or suprapubic area tenderness. Pulses: Distal pulses are intact. Neurological: Patient is alert. Pupils:  Pupils are equal, round, and reactive to light. Skin:  Warm.     Labs/Imaging/Diagnostics    Labs:  CBC:  Recent Labs     22  0545 22  0600   WBC 6.4 7.2   RBC 4.07* 4.45   HGB 11.0* 11.8*   HCT 33.6* 37.1   MCV 82.5 83.4   RDW 17.5* 17.3*    184       CHEMISTRIES:  Recent Labs     11/13/22  0547 11/14/22  0600 11/15/22  0542    141  --    K 3.2* 3.0*  --     96  --    CO2 36* 36*  --    BUN 11 8  --    CREATININE 0.51 0.46*  --    GLUCOSE 109* 109*  --    MG 1.6* 1.8 1.9       PT/INR:  No results for input(s): PROTIME, INR in the last 72 hours. APTT:No results for input(s): APTT in the last 72 hours. LIVER PROFILE:  No results for input(s): AST, ALT, BILIDIR, BILITOT, ALKPHOS in the last 72 hours. Imaging Last 24 Hours:  No results found. Assessment//Plan           Hospital Problems             Last Modified POA    * (Principal) Acute diastolic HF (heart failure) ([facility] [address] 11/11/2022 Yes    CHF (congestive heart failure), NYHA class I, acute on chronic, combined ([facility] [address] 11/11/2022 Yes   Acute on chronic diastolic hf  Afib  Abd abscess  HTN  Chronic respiratory failure  Assessment & Plan  11/14: Continue with current care. Rehab referrals made. Spoke with cardiology recommends for acute rehab. Follow-up with physical therapy note. Denies any needs. Continue oxygen therapy to keep oxygen saturation above 88%. 11/15: start Carafate, patient has severe epigastric pain after eating. GI evaluation. Rehab referrals pending. pt already on protonix, no overnight events, denies SOB and CP.   Electronically signed by Juan Alberto Amezquita MD on 11/14/22 at 9:27 AM EST

## 2022-11-15 NOTE — CONSULTS
Physical Medicine & Rehabilitation  Consult Note      Admitting Physician: Marco Vaughn MD    Primary Care Provider: Courtney Garcia MD     Reason for Consult:  Asses rehab needs, promote physical and mental function, analyze level of care to determine rehab needs, improve ability to actively participate in the rehabilitation process, and decrease likelihood of re-admit to the  hospital after discharge. History of Present Illness:    Brandyn Irving is a 80 y.o. female admitted to [facility] on 11/11/2022. 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 Other complicating issues include intra-abdominal abscess and is getting IV Invanz. Though her CHF has been getting better she continues to have nausea and poor p.o. intake from her abdominal issues. Apparently her drainage catheter came out about 2 to 4 weeks ago she was hospitalized and interventional radiology took her back to retrain it though the ultimate plan will be with a [facility] clinic for possible a larger drain. drain was accidentally pulled out a few weeks ago. Abdominal Pain  This is a recurrent problem. The current episode started in the past 7 days. The pain is at a severity of 6/10. The quality of the pain is aching, dull and a sensation of fullness. The abdominal pain radiates to the epigastric region. Associated symptoms include anorexia, arthralgias, constipation and myalgias. Pertinent negatives include no belching, diarrhea, dysuria, fever, frequency, headaches, hematuria, nausea or vomiting. The pain is aggravated by movement. The pain is relieved by Being still. She has tried acetaminophen and oral narcotic analgesics for the symptoms. The treatment provided mild relief. Prior diagnostic workup includes GI consult.  Her past medical history is significant for abdominal surgery. Fatigue  This is a recurrent problem. The current episode started in the past 7 days. Associated symptoms include anorexia, arthralgias, fatigue, myalgias and weakness. Pertinent negatives include no abdominal pain, chest pain, chills, congestion, coughing, diaphoresis, fever, headaches, nausea, neck pain, rash, sore throat or vomiting. The symptoms are aggravated by walking. She has tried rest for the symptoms. The treatment provided mild relief. I reviewed recent nursing notes discussed care with acute care providers, \" AWAITING PT EVAL. SPOKE WITH MANJU TOBIAS [facility], IF PT RETURNS HOME PT WILL NEED ALL NEW [facility] ORDER(SN-IV'S, PT/OT/AIDE) \". Events from the previous 24 hours reviewed    . Their inpatient work up has included:    Imaging:  Imaging and other studies reviewed and discussed with patient and staff    CTA CHEST  11/11/2022   Pulmonary Arteries: Pulmonary arteries are adequately opacified for evaluation. No evidence of intraluminal filling defect to suggest pulmonary embolism. Main pulmonary artery is normal in caliber. Mediastinum: There is severe cardiomegaly with particular enlargement of the right ventricle and atrium. Reflux of injected contrast is seen in the IVC and dilated hepatic veins suggestive of significant right heart dysfunction. No pericardial effusion is noted. Moderate coronary artery calcification is noted. Stable thoracic aortic aneurysm is seen. The ascending aorta measures up to 5.4 cm, previously 5.4 cm. The descending aorta measures 4.6 cm, previously 4.6 cm. Lungs/Pleura: Bilateral trace pleural effusions are seen. Smooth interlobular septal thickening with ground-glass opacity is noted dependently. Bilateral lower lobe subsegmental atelectasis is also seen. Lingula subsegmental atelectasis noted. Upper Abdomen: Perihepatic ascites is noted. Soft Tissues/Bones: No acute bone or soft tissue abnormality.      No evidence of pulmonary embolism. Severe cardiomegaly with evidence of congestive heart failure manifesting as mild edema and bilateral trace pleural effusions as well as significant right heart dysfunction including partially visualized perihepatic ascites. CT GUIDED NEEDLE PLACEMENT Successful aspiration of left lower quadrant abdominal abscess. No immediate complications identified. The patient left the CT suite in supine position to the recovery room in stable condition. Total local anesthetic used: lidocaine, approximately 8 mL. Estimated blood loss:  Negligible. The patient was observed in the recovery room for two hours after the procedure and discharged in stable condition. CT ABDOMEN PELVIS W IV CONTRAST Additional Contrast? Oral    1. The right-sided abscess drain has been completely pulled out of the abscess and now lies in the right abdominal subcutaneous soft tissues. 2.The right lower abdominal abscess has increased in size when compared to prior study dating September 14 likely due to accumulation of infectious fluid due to displacement of the drainage catheter. 3.Again seen is a large cystic mass in the lower pelvis. The visualized portion of the lung bases demonstrates a small effusion in the left thoracic cavity. The liver is of normal size and attenuation without focal lesions. The spleen is unremarkable. Kidneys demonstrate prompt enhancement and excretion of contrast without signs of hydronephrosis or focal mass. The pancreas and adrenals are unremarkable. The upper abdominal bowel loops appear normal. Again seen is ectasia of the visualized abdominal aorta and tortuosity, unchanged from prior. 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 There are no signs of upper abdominal fluid collections.  PELVIS  Again seen is a large abscess in the right lower abdomen/pelvis abutting the cecum now measuring 6.6 x 9.4 in transverse and AP dimensions, previously measuring 6 x 6.7 and AP and transverse dimensions. The previously seen abscess drain has now been pulled out of the abscess and lies in the subcutaneous soft tissues and is not contributing to any drainage of the abscess consistent with abscess enlargement. Again seen is a large cystic mass in the pelvis. XR CHEST   11/11/2022 The heart is enlarged. Bilateral aspect airspace disease is noted which could represent CHF or less likely pneumonia. There is no consolidation seen within the right lung base. There is left lung base consolidation which is favored to represent either atelectasis or a pleural effusion. Note is made of a right-sided PICC line. The catheter tip is at the level of the clavicle head. Cardiomegaly with multifocal bilateral airspace disease favored to represent CHF Left lung base consolidation which could represent atelectasis, pleural effusion or less likely pneumonia. .              Labs:    Labs reviewed and discussed with patient and staff    Lab Results   Component Value Date/Time    POCGLU 142 11/11/2022 12:32 PM    POCGLU 95 08/11/2022 06:29 AM    POCGLU 113 02/06/2021 04:16 PM    POCGLU 134 02/06/2021 11:21 AM     Lab Results   Component Value Date/Time     11/15/2022 05:42 AM    K 3.9 11/15/2022 05:42 AM    K 3.2 07/24/2022 05:00 AM    CL 95 11/15/2022 05:42 AM    CO2 26 11/15/2022 05:42 AM    BUN 10 11/15/2022 05:42 AM    CREATININE 0.55 11/15/2022 05:42 AM    CALCIUM 8.9 11/15/2022 05:42 AM    LABALBU 3.0 11/11/2022 12:30 PM    BILITOT 0.5 11/11/2022 12:30 PM    ALKPHOS 61 11/11/2022 12:30 PM    AST 49 11/11/2022 12:30 PM    ALT 44 11/11/2022 12:30 PM     Lab Results   Component Value Date/Time    WBC 7.2 11/14/2022 06:00 AM    RBC 4.45 11/14/2022 06:00 AM    HGB 11.8 11/14/2022 06:00 AM    HCT 37.1 11/14/2022 06:00 AM    MCV 83.4 11/14/2022 06:00 AM    MCH 26.4 11/14/2022 06:00 AM    MCHC 31.7 11/14/2022 06:00 AM    RDW 17.3 11/14/2022 06:00 AM     11/14/2022 06:00 AM     Lab Results   Component Value Date/Time    VITD25 56.0 10/02/2020 11:59 AM     Lab Results   Component Value Date/Time    COLORU Yellow 11/11/2022 02:15 PM    NITRU Negative 11/11/2022 02:15 PM    GLUCOSEU Negative 11/11/2022 02:15 PM    KETUA Negative 11/11/2022 02:15 PM    UROBILINOGEN 0.2 11/11/2022 02:15 PM    BILIRUBINUR Negative 11/11/2022 02:15 PM     Lab Results   Component Value Date/Time    PROTIME 18.9 11/11/2022 12:30 PM     Lab Results   Component Value Date/Time    INR 1.6 11/11/2022 12:30 PM         I discussed results with patient. Current Rehabilitation Assessments:    Rehabilitation:  Physical Therapy  Bed mobility:  Bed mobility  Bed Mobility Comments: Pt sitting up at EOB (11/14/22 1214)  Transfers:  Transfers  Sit to Stand: Contact guard assistance (11/14/22 1216)  Stand to Sit: Contact guard assistance (11/14/22 1216)  Bed to Chair: Contact guard assistance (11/14/22 1216)  Comment: Slow to complete. Heavy use of UEs for support and balance.  (11/14/22 1216)  Gait:   Ambulation  Comments: Deferred - pt without B AFOs (11/14/22 1217)  Stairs:     W/C mobility:         Occupational therapy:   Hand Dominance: Right  ADL  Feeding: Independent (11/14/22 1255)  Grooming: Supervision (11/14/22 1255)  UE Bathing: Stand by assistance (11/14/22 1255)  LE Bathing: Minimal assistance (11/14/22 1255)  UE Dressing: Stand by assistance (11/14/22 1255)  LE Dressing: Minimal assistance (11/14/22 1255)  Toileting: Minimal assistance (11/14/22 1255)  Additional Comments: Simulated ADLs as above, limited d/t fatigue and B AFOs not present (11/14/22 1255)  Toilet Transfers  Toilet Transfer: Unable to assess (11/14/22 1257)  Toilet Transfers Comments: Anticipate min A (11/14/22 1257)            Speech therapy:            Diet/Swallow:                         COGNITION  OT: Cognition Comment: Appears WFL, has limited English proficiency but able to follow visual cues  SP:             Re-evals pending      Past Medical History:        Diagnosis Date    Ascending aortic aneurysm 6/23/2017    Charcot Arleen Tooth muscular atrophy     CHF (congestive heart failure) (HCC)     Chronic diastolic CHF (congestive heart failure) ([facility] [address] 4/1/2022    Depression     Descending aortic aneurysm ([facility] [address] 6/23/2017    Essential hypertension 2/16/2018    Headache     HTN (hypertension)     Impaired mobility and activities of daily living     Lumbar stenosis with neurogenic claudication     Myelopathy (HCC)     Osteoarthritis          PastSurgical History:        Procedure Laterality Date    IR INS PICC VAD W SQ PORT GREATER THAN 5  9/23/2022    IR INS PICC VAD W SQ PORT GREATER THAN 5 9/23/2022 [facility] SPECIAL PROCEDURE    JOINT REPLACEMENT Bilateral     knees    OTHER SURGICAL HISTORY Right 07/21/2022    cat scan guided abdominal mass aspiration with drain placement by Dr. Mindy Muhammad Left 02/25/2021    LEFT PLEURAL CATHETER INSERTION performed by Suze Pride MD at [place] [id number] Left 01/29/2021    total of 755 cc removed per Dr Rosalina Coon specimen sent to lab         Allergies:     Allergies   Allergen Reactions    Latex     Tape Robbert Stands Tape]           CurrentMedications:   Current Facility-Administered Medications: furosemide (LASIX) tablet 40 mg, 40 mg, Oral, Daily  sucralfate (CARAFATE) tablet 1 g, 1 g, Oral, 4 times per day  piperacillin-tazobactam (ZOSYN) 3,375 mg in dextrose 5 % 50 mL IVPB (mini-bag), 3,375 mg, IntraVENous, Q8H  sacubitril-valsartan (ENTRESTO) 24-26 MG per tablet 1 tablet, 1 tablet, Oral, BID  potassium chloride (KLOR-CON M) extended release tablet 20 mEq, 20 mEq, Oral, BID  sodium chloride flush 0.9 % injection 5-40 mL, 5-40 mL, IntraVENous, 2 times per day  sodium chloride flush 0.9 % injection 5-40 mL, 5-40 mL, IntraVENous, PRN  0.9 % sodium chloride infusion, , IntraVENous, PRN  ondansetron (ZOFRAN-ODT) disintegrating tablet 4 mg, 4 mg, Oral, Q8H PRN **OR** ondansetron (ZOFRAN) injection 4 mg, 4 mg, IntraVENous, Q6H PRN  polyethylene glycol (GLYCOLAX) packet 17 g, 17 g, Oral, Daily PRN  acetaminophen (TYLENOL) tablet 650 mg, 650 mg, Oral, Q6H PRN **OR** acetaminophen (TYLENOL) suppository 650 mg, 650 mg, Rectal, Q6H PRN  digoxin (LANOXIN) tablet 125 mcg, 125 mcg, Oral, Daily  vitamin D (CHOLECALCIFEROL) tablet 2,000 Units, 2,000 Units, Oral, Dinner  rivaroxaban (XARELTO) tablet 15 mg, 15 mg, Oral, Daily with breakfast  levothyroxine (SYNTHROID) tablet 88 mcg, 88 mcg, Oral, Daily  ferrous sulfate (IRON 325) tablet 325 mg, 325 mg, Oral, BID WC  carvedilol (COREG) tablet 6.25 mg, 6.25 mg, Oral, BID  citalopram (CELEXA) tablet 20 mg, 20 mg, Oral, Daily  pantoprazole (PROTONIX) tablet 40 mg, 40 mg, Oral, Daily  lactobacillus acidophilus (FLORANEX) 4 tablet, 4 tablet, Oral, TID      Social History:  Social History     Socioeconomic History    Marital status:       Spouse name: Not on file    Number of children: 2    Years of education: Not on file    Highest education level: Not on file   Occupational History    Not on file   Tobacco Use    Smoking status: Never    Smokeless tobacco: Never   Vaping Use    Vaping Use: Never used   Substance and Sexual Activity    Alcohol use: No     Alcohol/week: 0.0 standard drinks    Drug use: No    Sexual activity: Not Currently   Other Topics Concern    Not on file   Social History Narrative    , Lives With: Alone, son lives down the street, dtr is in the area    Type of Home: [address]: One level    Home Access: Stairs to enter with rails- Number of Steps: 2- Rails: Both    Bathroom Shower/Tub: Tub/Shower unit, Bathroom Equipment: Grab bars in shower, Shower chair    Home Equipment: Rolling walker, Cane(Pt infrequemtly uses DME for ambulation and prefers to furniture walk in home)    ADL Assistance: Independent, [address] [place] [address] Responsibilities: Yes    Ambulation Assistance: Independent, Transfer Assistance: Independent    Additional Comments: Son stops over 2 times daily         Social Determinants of Health     Financial Resource Strain: Not on file   Food Insecurity: Not on file   Transportation Needs: Not on file   Physical Activity: Not on file   Stress: Not on file   Social Connections: Not on file   Intimate Partner Violence: Not on file   Housing Stability: Not on file        This history was obtained from family and caregivers and discussed to confirm. Family History:       Problem Relation Age of Onset    Arthritis Mother     Arthritis Father     High Blood Pressure Father        Review of Systems:  Review of Systems   Constitutional:  Positive for activity change and fatigue. Negative for chills, diaphoresis and fever. HENT:  Negative for congestion, ear discharge, ear pain, hearing loss, nosebleeds, sore throat and tinnitus. Eyes:  Negative for photophobia, pain and redness. Respiratory:  Positive for shortness of breath. Negative for cough, wheezing and stridor. Shortness of breath on exertion   Cardiovascular:  Negative for chest pain, palpitations and leg swelling. Gastrointestinal:  Positive for anorexia and constipation. Negative for abdominal pain, blood in stool, diarrhea, nausea and vomiting. Endocrine: Negative for polydipsia. Genitourinary:  Negative for dysuria, flank pain, frequency, hematuria and urgency. Musculoskeletal:  Positive for arthralgias, back pain, gait problem and myalgias. Negative for neck pain. Skin:  Negative for rash. Allergic/Immunologic: Positive for immunocompromised state. Negative for environmental allergies. Neurological:  Positive for weakness. Negative for dizziness, tremors, seizures and headaches. Hematological:  Does not bruise/bleed easily. Psychiatric/Behavioral:  Negative for hallucinations and suicidal ideas. The patient is not nervous/anxious.             Physical Exam:  /67   Pulse 78   Temp 97.7 °F (36.5 °C) (Oral)   Resp 16   Ht 4' 11\" (1.499 m)   Wt 151 lb 1.6 oz (68.5 kg)   SpO2 98%   BMI 30.52 kg/m²      Physical Exam  Constitutional:       General: She is not in acute distress. Appearance: Normal appearance. She is well-developed. She is not ill-appearing, toxic-appearing or diaphoretic. HENT:      Head: Normocephalic. Not macrocephalic and not microcephalic. No raccoon eyes or Hurtado's sign. Nose: No congestion. Mouth/Throat:      Pharynx: Oropharyngeal exudate present. Eyes:      General: No scleral icterus. Right eye: No discharge. Left eye: No discharge. Conjunctiva/sclera: Conjunctivae normal.      Pupils: Pupils are equal, round, and reactive to light. Neck:      Thyroid: No thyromegaly. Vascular: Normal carotid pulses. No carotid bruit, hepatojugular reflux or JVD. Trachea: No tracheal deviation. Cardiovascular:      Rate and Rhythm: Normal rate. Heart sounds: Normal heart sounds. Pulmonary:      Effort: Pulmonary effort is normal.      Breath sounds: No stridor. Decreased breath sounds present. Abdominal:      General: Bowel sounds are normal. There is distension. Palpations: Abdomen is soft. Tenderness: There is generalized abdominal tenderness (RLQ) and tenderness in the right lower quadrant and epigastric area. Comments:     Musculoskeletal:         General: Normal range of motion. Cervical back: Normal range of motion. Tenderness present. Spinous process tenderness and muscular tenderness present. Thoracic back: Tenderness present. Lumbar back: Tenderness present. Right lower leg: No edema. Left lower leg: No edema. Skin:     General: Skin is warm and dry. Coloration: Skin is pale. Findings: Bruising present. Comments: Old IV sites   Neurological:      Mental Status: She is alert and oriented to person, place, and time. Sensory: Sensory deficit present. Coordination: Coordination abnormal.      Gait: Gait abnormal.      Deep Tendon Reflexes:      Reflex Scores:       Tricep reflexes are 0 on the right side and 0 on the left side. Bicep reflexes are 0 on the right side and 0 on the left side. Brachioradialis reflexes are 1+ on the right side and 1+ on the left side. Patellar reflexes are 0 on the right side and 0 on the left side. Achilles reflexes are 0 on the right side and 0 on the left side. Psychiatric:         Attention and Perception: She is attentive. Mood and Affect: Mood normal. Mood is not anxious or depressed. Affect is not angry. Speech: Speech is delayed. Speech is not rapid and pressured. Behavior: Behavior normal. Behavior is not withdrawn. Thought Content: Thought content normal.         Cognition and Memory: Memory is not impaired. She exhibits impaired recent memory. She does not exhibit impaired remote memory. Judgment: Judgment is not impulsive or inappropriate. Ortho Exam  Neurologic Exam     Mental Status   Oriented to person, place, and time. Level of consciousness: alert  Knowledge: good. Cranial Nerves     CN III, IV, VI   Pupils are equal, round, and reactive to light.     Gait, Coordination, and Reflexes     Gait  Gait: wide-based    Reflexes   Right brachioradialis: 1+  Left brachioradialis: 1+  Right biceps: 0  Left biceps: 0  Right triceps: 0  Left triceps: 0  Right patellar: 0  Left patellar: 0  Right achilles: 0  Left achilles: 0B foot drop--AFOs           Diagnostics:    Recent Results (from the past 24 hour(s))   Magnesium    Collection Time: 11/15/22  5:42 AM   Result Value Ref Range    Magnesium 1.9 1.7 - 2.4 mg/dL   Basic Metabolic Panel    Collection Time: 11/15/22  5:42 AM   Result Value Ref Range    Sodium 140 135 - 144 mEq/L    Potassium 3.9 3.4 - 4.9 mEq/L    Chloride 95 95 - 107 mEq/L    CO2 26 20 - 31 mEq/L    Anion Gap 19 (H) 9 - 15 mEq/L Glucose 94 70 - 99 mg/dL    BUN 10 8 - 23 mg/dL    Creatinine 0.55 0.50 - 0.90 mg/dL    Est, Glom Filt Rate >60.0 >60    Calcium 8.9 8.5 - 9.9 mg/dL              Impression:    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 *            Recommendations:    Considering all of the factors above including the patient's current medical status, social status/home environment, their functional needs, and their ability to participate in a therapy program, I feel that they would best be served TBD:     Sub acute intensive comprehensive inpatient rehabilitation program.  Due to the diagnoses above the patient has had significant decline in function and is now requiring acute intensive therapy to optimize function. They are expected to achieve meaningful functional gains. Due to medical complexity as above, rehabilitation physician services is reasonable and necessary including face-to-face visits at least 3 days/week with anticipated need to treat, manage and modify the rehabilitation course of treatment including interdisciplinary team conferences. They will require rehab physician care to monitor for neurogenic bowel bladder, postoperative pain, titration of opiate and high risk medications,   blood pressure and blood sugar control.     It is my opinion that they will be able to tolerate and benefit from 1-3 hours of therapy a day. I reviewed the various options re: levels of care with the patient and family. Please see pre-admission screen note for further details. I discussed acute rehab with the patient and verify that the patient is able and willing to participate in 3 hours of therapy a day. Rehab and Acute Care Case Management has also reinforced this expectation. This patient requires multidisciplinary rehabilitation treatment, including daily care and management from a PM&R physician, 24-hour rehabilitation nursing, Physical Therapy, Occupational Therapy, rehabilitation psychology, consideration of speech and language pathology, recreational therapy, nutritional services, and a rehabilitation . I feel that it is reasonable to plan for a discharge to home setting after acute rehab. Vs  skilled rehabilitation level of care. It is my opinion that they will NOT be able to tolerate and benefit from 3 hours of therapy a day. I reviewed the various other options re: levels of care with the patient and family. Specialized nursing care to focus on: Bowel and bladder issues-Monitor for urinary retention-check PVRs, bladder scan--cath if no void. Wound management re  abd abscess drain -pressure relief protocols-side to side turns  IV medication administration  Invanz    Monitor endurance and if necessary spread therapy out over a 7-day window-adding scheduled rest breaks when needed. Focus on energy conservation. Monitor heart rate and   cardiac medications effects on heart rate and blood pressure before, during and after therapy. Progress toward endurance training with pulse ox monitoring for saturation and heart rate. continue to monitor closely for dehydration-- Improve hydration and nutrition by adding Vitamin B12 shot times one, adding Protein supplements and push PO fluids.       Treat and monitor for higher level cognitive deficits, focus on difficulty with sequencing and problem-solving. Focus on higher-level balance and falls risk issues focusing on balance training and monitoring for orthostasis. Above recommendations are indicated to address medical complexity and need for appropriate rehab services. Will tailor individual care and rehab plan per individuals needs re  drainage cath replacement. Focus of today's plan-   re assess in Tx      Required Certification Data (potential inpatient rehabilitation facility patient's only)    Deficits:weakness, nutrition, mobility, high risk for falls, decreased endurance, cardiovascular compromise, adjustment to disability, balance and righting reactions, cognitive deficits , and wound healing     Disability:mobility, locomotion, bowel/ bladder status, and cognitive    Potential barriers to progress/discharge:complex medical conditions, severe pain, and complex social situations         It was my pleasure to evaluate [address] [place] [address] today. Please call [phone] with questions.     John Florentino, DO

## 2022-11-16 ENCOUNTER — HOSPITAL ENCOUNTER (INPATIENT)
Age: 87
LOS: 7 days | Discharge: HOME HEALTH | DRG: 073 | End: 2022-11-23
Attending: PHYSICAL MEDICINE & REHABILITATION | Admitting: PHYSICAL MEDICINE & REHABILITATION
Payer: MEDICARE

## 2022-11-16 VITALS
RESPIRATION RATE: 18 BRPM | WEIGHT: 150.3 LBS | HEART RATE: 73 BPM | SYSTOLIC BLOOD PRESSURE: 111 MMHG | HEIGHT: 59 IN | DIASTOLIC BLOOD PRESSURE: 72 MMHG | BODY MASS INDEX: 30.3 KG/M2 | TEMPERATURE: 98.6 F | OXYGEN SATURATION: 96 %

## 2022-11-16 LAB
ANION GAP SERPL CALCULATED.3IONS-SCNC: 9 MEQ/L (ref 9–15)
BUN BLDV-MCNC: 11 MG/DL (ref 8–23)
CALCIUM SERPL-MCNC: 8.8 MG/DL (ref 8.5–9.9)
CHLORIDE BLD-SCNC: 98 MEQ/L (ref 95–107)
CO2: 32 MEQ/L (ref 20–31)
CREAT SERPL-MCNC: 0.52 MG/DL (ref 0.5–0.9)
GFR SERPL CREATININE-BSD FRML MDRD: >60 ML/MIN/{1.73_M2}
GLUCOSE BLD-MCNC: 115 MG/DL (ref 70–99)
GLUCOSE BLD-MCNC: 97 MG/DL (ref 70–99)
MAGNESIUM: 1.9 MG/DL (ref 1.7–2.4)
PERFORMED ON: ABNORMAL
POTASSIUM SERPL-SCNC: 3.3 MEQ/L (ref 3.4–4.9)
SARS-COV-2, NAAT: NOT DETECTED
SODIUM BLD-SCNC: 139 MEQ/L (ref 135–144)

## 2022-11-16 PROCEDURE — 6370000000 HC RX 637 (ALT 250 FOR IP): Performed by: INTERNAL MEDICINE

## 2022-11-16 PROCEDURE — 6360000002 HC RX W HCPCS: Performed by: INTERNAL MEDICINE

## 2022-11-16 PROCEDURE — 2580000003 HC RX 258: Performed by: INTERNAL MEDICINE

## 2022-11-16 PROCEDURE — 99231 SBSQ HOSP IP/OBS SF/LOW 25: CPT | Performed by: NURSE PRACTITIONER

## 2022-11-16 PROCEDURE — 80048 BASIC METABOLIC PNL TOTAL CA: CPT

## 2022-11-16 PROCEDURE — 2700000000 HC OXYGEN THERAPY PER DAY

## 2022-11-16 PROCEDURE — 99232 SBSQ HOSP IP/OBS MODERATE 35: CPT | Performed by: PHYSICAL MEDICINE & REHABILITATION

## 2022-11-16 PROCEDURE — 36415 COLL VENOUS BLD VENIPUNCTURE: CPT

## 2022-11-16 PROCEDURE — 1180000000 HC REHAB R&B

## 2022-11-16 PROCEDURE — 87635 SARS-COV-2 COVID-19 AMP PRB: CPT

## 2022-11-16 PROCEDURE — 83735 ASSAY OF MAGNESIUM: CPT

## 2022-11-16 PROCEDURE — 99232 SBSQ HOSP IP/OBS MODERATE 35: CPT | Performed by: INTERNAL MEDICINE

## 2022-11-16 RX ORDER — LEVOTHYROXINE SODIUM 88 UG/1
88 TABLET ORAL DAILY
Status: DISCONTINUED | OUTPATIENT
Start: 2022-11-17 | End: 2022-11-23 | Stop reason: HOSPADM

## 2022-11-16 RX ORDER — SODIUM CHLORIDE 0.9 % (FLUSH) 0.9 %
5-40 SYRINGE (ML) INJECTION EVERY 12 HOURS SCHEDULED
Status: CANCELLED | OUTPATIENT
Start: 2022-11-16

## 2022-11-16 RX ORDER — PANTOPRAZOLE SODIUM 40 MG/1
40 TABLET, DELAYED RELEASE ORAL DAILY
Status: CANCELLED | OUTPATIENT
Start: 2022-11-16

## 2022-11-16 RX ORDER — ONDANSETRON 2 MG/ML
4 INJECTION INTRAMUSCULAR; INTRAVENOUS EVERY 6 HOURS PRN
Status: CANCELLED | OUTPATIENT
Start: 2022-11-16

## 2022-11-16 RX ORDER — ONDANSETRON 4 MG/1
4 TABLET, ORALLY DISINTEGRATING ORAL EVERY 8 HOURS PRN
Status: DISCONTINUED | OUTPATIENT
Start: 2022-11-16 | End: 2022-11-23 | Stop reason: HOSPADM

## 2022-11-16 RX ORDER — PANTOPRAZOLE SODIUM 40 MG/1
40 TABLET, DELAYED RELEASE ORAL DAILY
Status: DISCONTINUED | OUTPATIENT
Start: 2022-11-17 | End: 2022-11-23 | Stop reason: HOSPADM

## 2022-11-16 RX ORDER — POTASSIUM CHLORIDE 20 MEQ/1
20 TABLET, EXTENDED RELEASE ORAL 2 TIMES DAILY
Status: CANCELLED | OUTPATIENT
Start: 2022-11-16

## 2022-11-16 RX ORDER — SODIUM CHLORIDE 0.9 % (FLUSH) 0.9 %
5-40 SYRINGE (ML) INJECTION PRN
Status: CANCELLED | OUTPATIENT
Start: 2022-11-16

## 2022-11-16 RX ORDER — CARVEDILOL 6.25 MG/1
6.25 TABLET ORAL 2 TIMES DAILY
Status: DISCONTINUED | OUTPATIENT
Start: 2022-11-16 | End: 2022-11-23 | Stop reason: HOSPADM

## 2022-11-16 RX ORDER — VITAMIN B COMPLEX
2000 TABLET ORAL
Status: DISCONTINUED | OUTPATIENT
Start: 2022-11-17 | End: 2022-11-23 | Stop reason: HOSPADM

## 2022-11-16 RX ORDER — ONDANSETRON 4 MG/1
4 TABLET, ORALLY DISINTEGRATING ORAL EVERY 8 HOURS PRN
Status: CANCELLED | OUTPATIENT
Start: 2022-11-16

## 2022-11-16 RX ORDER — ONDANSETRON 2 MG/ML
4 INJECTION INTRAMUSCULAR; INTRAVENOUS EVERY 6 HOURS PRN
Status: DISCONTINUED | OUTPATIENT
Start: 2022-11-16 | End: 2022-11-23 | Stop reason: HOSPADM

## 2022-11-16 RX ORDER — L. ACIDOPHILUS/L.BULGARICUS 1MM CELL
4 TABLET ORAL 3 TIMES DAILY
Status: CANCELLED | OUTPATIENT
Start: 2022-11-16

## 2022-11-16 RX ORDER — ACETAMINOPHEN 650 MG/1
650 SUPPOSITORY RECTAL EVERY 6 HOURS PRN
Status: DISCONTINUED | OUTPATIENT
Start: 2022-11-16 | End: 2022-11-17

## 2022-11-16 RX ORDER — SODIUM CHLORIDE 0.9 % (FLUSH) 0.9 %
5-40 SYRINGE (ML) INJECTION PRN
Status: DISCONTINUED | OUTPATIENT
Start: 2022-11-16 | End: 2022-11-23 | Stop reason: HOSPADM

## 2022-11-16 RX ORDER — ACETAMINOPHEN 650 MG/1
650 SUPPOSITORY RECTAL EVERY 6 HOURS PRN
Status: CANCELLED | OUTPATIENT
Start: 2022-11-16

## 2022-11-16 RX ORDER — DIGOXIN 125 MCG
125 TABLET ORAL DAILY
Status: DISCONTINUED | OUTPATIENT
Start: 2022-11-17 | End: 2022-11-23 | Stop reason: HOSPADM

## 2022-11-16 RX ORDER — FUROSEMIDE 40 MG/1
40 TABLET ORAL DAILY
Status: CANCELLED | OUTPATIENT
Start: 2022-11-16

## 2022-11-16 RX ORDER — FERROUS SULFATE 325(65) MG
325 TABLET ORAL 2 TIMES DAILY WITH MEALS
Status: DISCONTINUED | OUTPATIENT
Start: 2022-11-17 | End: 2022-11-23 | Stop reason: HOSPADM

## 2022-11-16 RX ORDER — CITALOPRAM 20 MG/1
20 TABLET ORAL DAILY
Status: DISCONTINUED | OUTPATIENT
Start: 2022-11-17 | End: 2022-11-23 | Stop reason: HOSPADM

## 2022-11-16 RX ORDER — SUCRALFATE 1 G/1
1 TABLET ORAL EVERY 6 HOURS SCHEDULED
Status: DISCONTINUED | OUTPATIENT
Start: 2022-11-16 | End: 2022-11-23 | Stop reason: HOSPADM

## 2022-11-16 RX ORDER — SUCRALFATE 1 G/1
1 TABLET ORAL EVERY 6 HOURS SCHEDULED
Status: CANCELLED | OUTPATIENT
Start: 2022-11-16

## 2022-11-16 RX ORDER — POLYETHYLENE GLYCOL 3350 17 G/17G
17 POWDER, FOR SOLUTION ORAL DAILY PRN
Status: DISCONTINUED | OUTPATIENT
Start: 2022-11-16 | End: 2022-11-23 | Stop reason: HOSPADM

## 2022-11-16 RX ORDER — FUROSEMIDE 40 MG/1
40 TABLET ORAL DAILY
Status: DISCONTINUED | OUTPATIENT
Start: 2022-11-17 | End: 2022-11-23 | Stop reason: HOSPADM

## 2022-11-16 RX ORDER — SODIUM CHLORIDE 0.9 % (FLUSH) 0.9 %
5-40 SYRINGE (ML) INJECTION EVERY 12 HOURS SCHEDULED
Status: DISCONTINUED | OUTPATIENT
Start: 2022-11-16 | End: 2022-11-23 | Stop reason: HOSPADM

## 2022-11-16 RX ORDER — ACETAMINOPHEN 325 MG/1
650 TABLET ORAL EVERY 6 HOURS PRN
Status: DISCONTINUED | OUTPATIENT
Start: 2022-11-16 | End: 2022-11-17

## 2022-11-16 RX ORDER — FERROUS SULFATE 325(65) MG
325 TABLET ORAL 2 TIMES DAILY WITH MEALS
Status: CANCELLED | OUTPATIENT
Start: 2022-11-16

## 2022-11-16 RX ORDER — DIGOXIN 125 MCG
125 TABLET ORAL DAILY
Status: CANCELLED | OUTPATIENT
Start: 2022-11-16

## 2022-11-16 RX ORDER — LEVOTHYROXINE SODIUM 88 UG/1
88 TABLET ORAL DAILY
Status: CANCELLED | OUTPATIENT
Start: 2022-11-16

## 2022-11-16 RX ORDER — L. ACIDOPHILUS/L.BULGARICUS 1MM CELL
4 TABLET ORAL 3 TIMES DAILY
Status: DISCONTINUED | OUTPATIENT
Start: 2022-11-16 | End: 2022-11-23 | Stop reason: HOSPADM

## 2022-11-16 RX ORDER — CARVEDILOL 6.25 MG/1
6.25 TABLET ORAL 2 TIMES DAILY
Status: CANCELLED | OUTPATIENT
Start: 2022-11-16

## 2022-11-16 RX ORDER — POLYETHYLENE GLYCOL 3350 17 G/17G
17 POWDER, FOR SOLUTION ORAL DAILY PRN
Status: CANCELLED | OUTPATIENT
Start: 2022-11-16

## 2022-11-16 RX ORDER — VITAMIN B COMPLEX
2000 TABLET ORAL
Status: CANCELLED | OUTPATIENT
Start: 2022-11-16

## 2022-11-16 RX ORDER — CITALOPRAM 20 MG/1
20 TABLET ORAL DAILY
Status: CANCELLED | OUTPATIENT
Start: 2022-11-16

## 2022-11-16 RX ORDER — ACETAMINOPHEN 325 MG/1
650 TABLET ORAL EVERY 6 HOURS PRN
Status: CANCELLED | OUTPATIENT
Start: 2022-11-16

## 2022-11-16 RX ORDER — POTASSIUM CHLORIDE 20 MEQ/1
20 TABLET, EXTENDED RELEASE ORAL 2 TIMES DAILY
Status: DISCONTINUED | OUTPATIENT
Start: 2022-11-16 | End: 2022-11-23 | Stop reason: HOSPADM

## 2022-11-16 RX ADMIN — CARVEDILOL 6.25 MG: 6.25 TABLET, FILM COATED ORAL at 10:20

## 2022-11-16 RX ADMIN — LACTOBACILLUS TAB 4 TABLET: TAB at 10:21

## 2022-11-16 RX ADMIN — FUROSEMIDE 40 MG: 40 TABLET ORAL at 10:21

## 2022-11-16 RX ADMIN — POTASSIUM CHLORIDE 20 MEQ: 1500 TABLET, EXTENDED RELEASE ORAL at 20:52

## 2022-11-16 RX ADMIN — SUCRALFATE 1 G: 1 TABLET ORAL at 00:34

## 2022-11-16 RX ADMIN — Medication 10 ML: at 10:21

## 2022-11-16 RX ADMIN — PIPERACILLIN AND TAZOBACTAM 3375 MG: 3; .375 INJECTION, POWDER, FOR SOLUTION INTRAVENOUS at 04:19

## 2022-11-16 RX ADMIN — PANTOPRAZOLE SODIUM 40 MG: 40 TABLET, DELAYED RELEASE ORAL at 10:21

## 2022-11-16 RX ADMIN — SUCRALFATE 1 G: 1 TABLET ORAL at 12:34

## 2022-11-16 RX ADMIN — DIGOXIN 125 MCG: 125 TABLET ORAL at 10:20

## 2022-11-16 RX ADMIN — SACUBITRIL AND VALSARTAN 1 TABLET: 24; 26 TABLET, FILM COATED ORAL at 20:52

## 2022-11-16 RX ADMIN — LEVOTHYROXINE SODIUM 88 MCG: 88 TABLET ORAL at 10:22

## 2022-11-16 RX ADMIN — SUCRALFATE 1 G: 1 TABLET ORAL at 23:07

## 2022-11-16 RX ADMIN — POTASSIUM CHLORIDE 20 MEQ: 1500 TABLET, EXTENDED RELEASE ORAL at 10:22

## 2022-11-16 RX ADMIN — SACUBITRIL AND VALSARTAN 1 TABLET: 24; 26 TABLET, FILM COATED ORAL at 10:21

## 2022-11-16 RX ADMIN — FERROUS SULFATE TAB 325 MG (65 MG ELEMENTAL FE) 325 MG: 325 (65 FE) TAB at 10:21

## 2022-11-16 RX ADMIN — PIPERACILLIN AND TAZOBACTAM 3375 MG: 3; .375 INJECTION, POWDER, FOR SOLUTION INTRAVENOUS at 12:37

## 2022-11-16 RX ADMIN — SUCRALFATE 1 G: 1 TABLET ORAL at 05:54

## 2022-11-16 RX ADMIN — CARVEDILOL 6.25 MG: 6.25 TABLET, FILM COATED ORAL at 20:53

## 2022-11-16 RX ADMIN — PIPERACILLIN AND TAZOBACTAM 3375 MG: 3; .375 INJECTION, POWDER, FOR SOLUTION INTRAVENOUS at 23:10

## 2022-11-16 RX ADMIN — LACTOBACILLUS TAB 4 TABLET: TAB at 20:52

## 2022-11-16 RX ADMIN — SUCRALFATE 1 G: 1 TABLET ORAL at 18:35

## 2022-11-16 RX ADMIN — CITALOPRAM HYDROBROMIDE 20 MG: 20 TABLET ORAL at 10:21

## 2022-11-16 RX ADMIN — RIVAROXABAN 15 MG: 15 TABLET, FILM COATED ORAL at 10:21

## 2022-11-16 RX ADMIN — Medication 10 ML: at 23:08

## 2022-11-16 ASSESSMENT — ENCOUNTER SYMPTOMS
WHEEZING: 0
BLOOD IN STOOL: 0
STRIDOR: 0
ABDOMINAL PAIN: 1
COUGH: 0
NAUSEA: 0
CHEST TIGHTNESS: 0
EYES NEGATIVE: 1

## 2022-11-16 ASSESSMENT — PAIN SCALES - GENERAL: PAINLEVEL_OUTOF10: 0

## 2022-11-16 NOTE — PROGRESS NOTES
Gastroenterology Progress Note    Maria C Powell is a 80 y.o. female patient. Hospitalization Day:5    Chief C/O: abdominal pain    SUBJECTIVE: Seen and examined, no acute events overnight, tolerating diet, no nausea or vomiting, patient reports generalized lower abdominal pain only while sitting up, no epigastric pain. ROS:  Gastrointestinal ROS: lower abdominal pain, no change in bowel habits, or black or bloody stools    Physical    VITALS:  /72   Pulse 74   Temp 98.7 °F (37.1 °C) (Oral)   Resp 18   Ht 4' 11\" (1.499 m)   Wt 150 lb 4.8 oz (68.2 kg)   SpO2 96%   BMI 30.36 kg/m²   TEMPERATURE:  Current - Temp: 98.7 °F (37.1 °C); Max - Temp  Av °F (36.7 °C)  Min: 97.5 °F (36.4 °C)  Max: 98.7 °F (37.1 °C)    General:  Alert and oriented,  No apparent distress  Skin- without jaundice  Eyes: anicteric sclera  Cardiac: RRR, Nl s1s2, without murmurs  Lungs CTA Bilaterally, normal effort  Abdomen soft, ND, NT, no HSM, Bowel sounds normal  Ext: without edema  Neuro: no asterixis     Data    Data Review:    Recent Labs     22  0600   WBC 7.2   HGB 11.8*   HCT 37.1   MCV 83.4        Recent Labs     22  0600 11/15/22  0542 22  0529    140 139   K 3.0* 3.9 3.3*   CL 96 95 98   CO2 36* 26 32*   BUN 8 10 11   CREATININE 0.46* 0.55 0.52     No results for input(s): AST, ALT, ALB, BILIDIR, BILITOT, ALKPHOS in the last 72 hours. No results for input(s): LIPASE, AMYLASE in the last 72 hours. No results for input(s): PROTIME, INR in the last 72 hours. ASSESSMENT:  81 y/o female admitted with CHF and SOB, now with c/o epigastric pain after meals. symptoms are intermittent in nature, started recently, no NSAIDs. No melena or hematochezia. Was initiated on PPI and Carafate. No history of EGD. No dysphagia. No unintentional weight loss. At baseline wears oxygen 2-3 L NC.    Of note pt is currently on long term course of Atbx for abdominal abscess not amenable to

## 2022-11-16 NOTE — PROGRESS NOTES
Progress Note  Patient: Joanne Ryan  Unit/Bed: D665/Z979-97  YOB: 1932  MRN: [id number]  Acct: [de-identified]   Admitting Diagnosis: Acute diastolic HF (heart failure) (HCC) [I50.31]  CHF (congestive heart failure), NYHA class I, acute on chronic, combined ([address] [I50.43]  Dyspnea, unspecified type [T95.73]  Systolic congestive heart failure, unspecified HF chronicity ([address] [I50.20]  Admit Date:  11/11/2022  Hospital Day: 5    Chief Complaint: CHF    Histories:  Past Medical History:   Diagnosis Date    Ascending aortic aneurysm 6/23/2017    Charcot Arleen Tooth muscular atrophy     CH[facility] (congestive heart failure) ([facility])     Chronic diastolic CHF (congestive heart failure) ([address] 4/1/2022    Depression     Descending aortic aneurysm ([facility] 6/23/2017    Essential hypertension 2/16/2018    Headache     HTN (hypertension)     Impaired mobility and activities of daily living     Lumbar stenosis with neurogenic claudication     Myelopathy (HCC)     Osteoarthritis      Past Surgical History:   Procedure Laterality Date    IR INS PICC VAD W SQ PORT GREATER THAN 5  9/23/2022    IR INS PICC VAD W SQ PORT GREATER THAN 5 9/23/2022 [facility] SPECIAL PROCEDURE    JOINT REPLACEMENT Bilateral     knees    OTHER SURGICAL HISTORY Right 07/21/2022    cat scan guided abdominal mass aspiration with drain placement by Dr. Radames Cox Left 02/25/2021    LEFT PLEURAL CATHETER INSERTION performed by Geronimo Jones MD at [place] [id number] Left 01/29/2021    total of 755 cc removed per Dr Pablo Push specimen sent to lab     Family History   Problem Relation Age of Onset    Arthritis Mother     Arthritis Father     High Blood Pressure Father      Social History     Socioeconomic History    Marital status:       Spouse name: None    Number of children: 2    Years of education: None    Highest education level: None   Tobacco Use    Smoking status: Never    Smokeless tobacco: Never   Vaping Use    Vaping Use: Never used   Substance and Sexual Activity    Alcohol use: No     Alcohol/week: 0.0 standard drinks    Drug use: No    Sexual activity: Not Currently   Social History Narrative    , Lives With: Alone, son lives down the street, dtr is in the area    [place] of [place] [address]: One level    Home Access: Stairs to enter with rails- Number of Steps: 2- Rails: Both    Bathroom Shower/Tub: Tub/Shower unit, Bathroom Equipment: Grab bars in shower, Shower chair    Home Equipment: Rolling walker, Cane(Pt infrequemtly uses DME for ambulation and prefers to furniture walk in home)    ADL Assistance: Independent, [facility] Responsibilities: Yes    Ambulation Assistance: Independent, Transfer Assistance: Independent    Additional Comments: Son stops over 2 times daily           Subjective/HPI no acute events overnight. On 4L O2sats are good. Uses home O2 2L. No CP still w right sided abd discomfort. No Edema    EKG:SR 77        Review of Systems:   Review of Systems   Constitutional: Negative. Negative for diaphoresis and fatigue. HENT: Negative. Eyes: Negative. Respiratory:  Negative for cough, chest tightness, wheezing and stridor. Cardiovascular:  Negative for chest pain and palpitations. Gastrointestinal:  Positive for abdominal pain. Negative for blood in stool and nausea. Genitourinary: Negative. Musculoskeletal: Negative. Skin: Negative. Neurological:  Positive for weakness. Negative for dizziness, syncope and light-headedness. Hematological: Negative. Psychiatric/Behavioral: Negative. Physical Examination:    BP (!) 140/86   Pulse 75   Temp 97.7 °F (36.5 °C) (Oral)   Resp 16   Ht 4' 11\" (1.499 m)   Wt 150 lb 4.8 oz (68.2 kg)   SpO2 98%   BMI 30.36 kg/m²    Physical Exam   Constitutional: No distress. She appears chronically ill and acutely ill.    HENT:   Normal cephalic and Atraumatic Eyes: Pupils are equal, round, and reactive to light. Neck: Thyroid normal. No JVD present. No neck adenopathy. No thyromegaly present. Cardiovascular: Normal rate, regular rhythm, intact distal pulses and normal pulses. Murmur heard. Pulmonary/Chest: Effort normal and breath sounds normal. She has no wheezes. She has no rales. She exhibits no tenderness. Abdominal: Soft. Bowel sounds are normal. There is no abdominal tenderness. Musculoskeletal:         General: No tenderness or edema. Normal range of motion. Cervical back: Normal range of motion and neck supple. Neurological: She is alert and oriented to person, place, and time. Skin: Skin is warm. No cyanosis. Nails show no clubbing.      LABS:  CBC:   Lab Results   Component Value Date/Time    WBC 7.2 11/14/2022 06:00 AM    RBC 4.45 11/14/2022 06:00 AM    HGB 11.8 11/14/2022 06:00 AM    HCT 37.1 11/14/2022 06:00 AM    MCV 83.4 11/14/2022 06:00 AM    MCH 26.4 11/14/2022 06:00 AM    MCHC 31.7 11/14/2022 06:00 AM    RDW 17.3 11/14/2022 06:00 AM     11/14/2022 06:00 AM     CBC with Differential:    Lab Results   Component Value Date/Time    WBC 7.2 11/14/2022 06:00 AM    RBC 4.45 11/14/2022 06:00 AM    HGB 11.8 11/14/2022 06:00 AM    HCT 37.1 11/14/2022 06:00 AM     11/14/2022 06:00 AM    MCV 83.4 11/14/2022 06:00 AM    MCH 26.4 11/14/2022 06:00 AM    MCHC 31.7 11/14/2022 06:00 AM    RDW 17.3 11/14/2022 06:00 AM    BANDSPCT 2 07/24/2022 05:00 AM    METASPCT 2 07/24/2022 05:00 AM    LYMPHOPCT 7.9 11/14/2022 06:00 AM    MONOPCT 10.8 11/14/2022 06:00 AM    BASOPCT 0.9 11/14/2022 06:00 AM    MONOSABS 0.8 11/14/2022 06:00 AM    LYMPHSABS 0.6 11/14/2022 06:00 AM    EOSABS 0.4 11/14/2022 06:00 AM    BASOSABS 0.1 11/14/2022 06:00 AM     CMP:    Lab Results   Component Value Date/Time     11/15/2022 05:42 AM    K 3.9 11/15/2022 05:42 AM    K 3.2 07/24/2022 05:00 AM    CL 95 11/15/2022 05:42 AM    CO2 26 11/15/2022 05:42 AM    BUN 10 11/15/2022 05:42 AM    CREATININE 0.55 11/15/2022 05:42 AM    GFRAA >60.0 10/11/2022 10:30 AM    LABGLOM >60.0 11/15/2022 05:42 AM    GLUCOSE 94 11/15/2022 05:42 AM    PROT 6.4 11/11/2022 12:30 PM    LABALBU 3.0 11/11/2022 12:30 PM    CALCIUM 8.9 11/15/2022 05:42 AM    BILITOT 0.5 11/11/2022 12:30 PM    ALKPHOS 61 11/11/2022 12:30 PM    AST 49 11/11/2022 12:30 PM    ALT 44 11/11/2022 12:30 PM     BMP:    Lab Results   Component Value Date/Time     11/15/2022 05:42 AM    K 3.9 11/15/2022 05:42 AM    K 3.2 07/24/2022 05:00 AM    CL 95 11/15/2022 05:42 AM    CO2 26 11/15/2022 05:42 AM    BUN 10 11/15/2022 05:42 AM    LABALBU 3.0 11/11/2022 12:30 PM    CREATININE 0.55 11/15/2022 05:42 AM    CALCIUM 8.9 11/15/2022 05:42 AM    GFRAA >60.0 10/11/2022 10:30 AM    LABGLOM >60.0 11/15/2022 05:42 AM    GLUCOSE 94 11/15/2022 05:42 AM     Magnesium:    Lab Results   Component Value Date/Time    MG 1.9 11/15/2022 05:42 AM     Troponin:    Lab Results   Component Value Date/Time    TROPONINI <0.010 11/11/2022 12:30 PM        Active Hospital Problems    Diagnosis Date Noted    A-fib [facility]) [I48.91]      Priority: High    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 [Z74.09, Z78.9]      Priority: High    Acute diastolic HF (heart failure) ([facility] [I50.31] 11/11/2022     Priority: Medium    CHF (congestive heart failure), NYHA class I, acute on chronic, combined ([facility] [I50.43] 11/11/2022     Priority: Medium    Intracardiac thrombosis, not elsewhere classified [I51.3] 08/18/2022     Priority: Medium    Intra-abdominal abscess ([facility] [K65.1] 08/08/2022     Priority: Medium    Acute pulmonary edema ([facility] [J81.0]      Priority: Low        Assessment/Plan:  Acute on chronic DHF-  She is Euvolemic. Continue PO Lasix. Wean O2 discussed w RN  Echo EF 40 from prior 60. Likely related to AF. Added Entresto. Dc ARB  Keep on Telemetry  AF- Xarelto. Rate control added DIg.   In SR    Ascending AO Aneurysm 5.3 cm - no plans for surgery per pt and daughter. She has Mural Thrombus. - on Xarelto  HTN stable   Abd Abscess and discomfort - on iv ABX. Continued discomfort. CAD Moderate LAD - no CP reported.    Elevated D Dimer- CT Chest - negative PE  PTOT Rehab eval       Electronically signed by Jax Jefferson MD on 11/16/2022 at 7:10 AM

## 2022-11-16 NOTE — PROGRESS NOTES
Patient admitted from 1W. Transferred to bed via pivot x1. Vitals stable. On 2L oxygen which prior floor stated patient wears at home. Mainly [place] speaking but can communicate basic needs. Will need consents signed by son. Has no complaints of pain. Skin assessed with LPN. Bruising noted to BLE, small abrasions to BLE and old surgical scar to RLQ. Single lumen PICC to RUE. Has been continent of B&B, LBM today.  Electronically signed by Jaylen Riggins RN on 11/16/22 at 6:50 PM EST

## 2022-11-16 NOTE — PROGRESS NOTES
Physical Therapy Missed Treatment   Facility/Department: [facility] MED SURG T479/V364-96    NAME: Naomi Bowen    : 1932 (80 y.o.)  MRN: [id number]    Account: [de-identified]  Gender: female    Chart reviewed, attempted PT at  [address] Patient unavailable 2° to:    [] Hold per nsg request    [x] Pt declined Pt soundly sleep, attempted to wake did not rise. [x] Nsg notified   [] Other notified    [] Pt. . off floor for test/procedure. [] Pt. Unavailable        Will attempt PT treatment again if time permits.       Electronically signed by Jett Orozco PTA on 22 at 11:31 AM EST

## 2022-11-16 NOTE — DISCHARGE SUMMARY
Discharge Summary    Date: 11/16/2022  Patient Name: José Gallo    YOB: 1932     Age: 80 y.o. 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 [facility] Stay  Narrative of Hospital Course:  Patient comes shortness of breath found to have acute on chronic diastolic failure. CT angio was negative for PE for elevated D-dimer. Patient was evaluated by GI for epigastric pain after eating. Patient was started on Carafate. Improving. Patient is to follow-up with GI closely. Recommend EGD as outpatient. Patient also has intra-abdominal infection on IV Zosyn. ID has been following the patient. Consultants:  IP CONSULT TO CARDIOLOGY  IP CONSULT TO CARDIOLOGY  IP CONSULT TO INFECTIOUS DISEASES  IP CONSULT TO SOCIAL WORK  IP CONSULT TO GI  IP CONSULT TO REHAB/TCU ADMISSION COORDINATOR  IP CONSULT TO CARDIOLOGY  IP CONSULT TO SOCIAL WORK  IP CONSULT TO REHAB/TCU ADMISSION COORDINATOR    Surgeries/procedures Performed:      Treatments:    Antibiotics    Zosyn    Discharge Plan/Disposition:  Home    Hospital/Incidental Findings Requiring Follow Up:    Patient Instructions:    Diet:    Activity:  For number of days (if applicable):       Other Instructions:    Provider Follow-Up:   No follow-ups on file.      Significant Diagnostic Studies:    Recent Labs:  Admission on 11/11/2022  Ventricular Rate                              Date: 11/11/2022  Value: 80          Ref range: BPM                Status: Final  Atrial Rate                                   Date: 11/11/2022  Value: 277         Ref range: BPM                Status: Final  QRS Duration                                  Date: 11/11/2022  Value: 138         Ref range: ms                 Status: Final  Q-T Interval                                  Date: 11/11/2022  Value: 424         Ref range: ms                 Status: Final  QTc Calculation ([place])                      Date: 11/11/2022  Value: 489         Ref range: ms                 Status: Final  P Axis                                        Date: 11/11/2022  Value: 203         Ref range: degrees            Status: Final  R Axis                                        Date: 11/11/2022  Value: -75         Ref range: degrees            Status: Final  T Axis                                        Date: 11/11/2022  Value: 202         Ref range: degrees            Status: Final  Protime                                       Date: 11/11/2022  Value: 18.9 (A)    Ref range: 12.3 - 14.9 sec    Status: Final  INR                                           Date: 11/11/2022  Value: 1.6           Status: Final  Sodium                                        Date: 11/11/2022  Value: 139         Ref range: 135 - 144 mEq/L    Status: Final  Potassium                                     Date: 11/11/2022  Value: 3.2 (A)     Ref range: 3.4 - 4.9 mEq/L    Status: Final  Chloride                                      Date: 11/11/2022  Value: 103         Ref range: 95 - 107 mEq/L     Status: Final  CO2                                           Date: 11/11/2022  Value: 26          Ref range: 20 - 31 mEq/L      Status: Final  Anion Gap Date: 11/11/2022  Value: 10          Ref range: 9 - 15 mEq/L       Status: Final  Glucose                                       Date: 11/11/2022  Value: 129 (A)     Ref range: 70 - 99 mg/dL      Status: Final  BUN                                           Date: 11/11/2022  Value: 16          Ref range: 8 - 23 mg/dL       Status: Final  Creatinine                                    Date: 11/11/2022  Value: 0.51        Ref range: 0.50 - 0.90 mg/dL  Status: Final  Est, Glom Filt Rate                           Date: 11/11/2022  Value: >60.0       Ref range: >60                Status: Final                Comment: Pediatric calculator link  [email or web address]  Effective Oct 3, 2022  These results are not intended for use in patients  <25years of age. eGFR results are calculated without  a race factor using the 2021 CKD-EPI equation. Careful  clinical correlation is recommended, particularly when  comparing to results calculated using previous equations. The CKD-EPI equation is less accurate in patients with  extremes of muscle mass, extra-renal metabolism of  creatinine, excessive creatinine ingestion, or following  therapy that affects renal tubular secretion.     Calcium                                       Date: 11/11/2022  Value: 8.9         Ref range: 8.5 - 9.9 mg/dL    Status: Final  Total Protein                                 Date: 11/11/2022  Value: 6.4         Ref range: 6.3 - 8.0 g/dL     Status: Final  Albumin                                       Date: 11/11/2022  Value: 3.0 (A)     Ref range: 3.5 - 4.6 g/dL     Status: Final  Total Bilirubin                               Date: 11/11/2022  Value: 0.5         Ref range: 0.2 - 0.7 mg/dL    Status: Final  Alkaline Phosphatase                          Date: 11/11/2022  Value: 61          Ref range: 40 - 130 U/L       Status: Final  ALT                                           Date: 11/11/2022  Value: 44 (A) Ref range: 0 - 33 U/L         Status: Final  AST                                           Date: 11/11/2022  Value: 49 (A)      Ref range: 0 - 35 U/L         Status: Final  Globulin                                      Date: 11/11/2022  Value: 3.4         Ref range: 2.3 - 3.5 g/dL     Status: Final  WBC                                           Date: 11/11/2022  Value: 6.8         Ref range: 4.8 - 10.8 K/uL    Status: Final  RBC                                           Date: 11/11/2022  Value: 4.24        Ref range: 4.20 - 5.40 M/uL   Status: Final  Hemoglobin                                    Date: 11/11/2022  Value: 11.1 (A)    Ref range: 12.0 - 16.0 g/dL   Status: Final  Hematocrit                                    Date: 11/11/2022  Value: 35.3 (A)    Ref range: 37.0 - 47.0 %      Status: Final  MCV                                           Date: 11/11/2022  Value: 83.3        Ref range: 79.4 - 94.8 fL     Status: Final  MCH                                           Date: 11/11/2022  Value: 26.2 (A)    Ref range: 27.0 - 31.3 pg     Status: Final  MCHC                                          Date: 11/11/2022  Value: 31.5 (A)    Ref range: 33.0 - 37.0 %      Status: Final  RDW                                           Date: 11/11/2022  Value: 17.1 (A)    Ref range: 11.5 - 14.5 %      Status: Final  Platelets                                     Date: 11/11/2022  Value: 185         Ref range: 130 - 400 K/uL     Status: Final  Neutrophils %                                 Date: 11/11/2022  Value: 78.8        Ref range: %                  Status: Final  Lymphocytes %                                 Date: 11/11/2022  Value: 10.8        Ref range: %                  Status: Final  Monocytes %                                   Date: 11/11/2022  Value: 8.9         Ref range: %                  Status: Final  Eosinophils %                                 Date: 11/11/2022  Value: 0.7         Ref range: % 11/11/2022  Value: 142         Ref range: 136 - 145 mEq/L    Status: Final  POC Potassium                                 Date: 11/11/2022  Value: 3.2 (A)     Ref range: 3.5 - 5.1 mEq/L    Status: Final  POC Chloride                                  Date: 11/11/2022  Value: 101         Ref range: 99 - 110 mEq/L     Status: Final  POC Glucose                                   Date: 11/11/2022  Value: 142 (A)     Ref range: 70 - 99 mg/dl      Status: Final  POC Creatinine                                Date: 11/11/2022  Value: 0.7         Ref range: 0.6 - 1.2 mg/dL    Status: Final  Est, Glom Filt Rate                           Date: 11/11/2022  Value: >60         Ref range: >60                Status: Final                Comment: Pediatric calculator link  [email or web address]. [email or web address]  Effective Oct 3, 2022  These results are not intended for use in patients  <25years of age. eGFR results are calculated without  a race factor using the 2021 CKD-EPI equation. Careful  clinical correlation is recommended, particularly when  comparing to results calculated using previous equations. The CKD-EPI equation is less accurate in patients with  extremes of muscle mass, extra-renal metabolism of  creatinine, excessive creatinine ingestion, or following  therapy that affects renal tubular secretion.     Calcium, Ionized                              Date: 11/11/2022  Value: 1.25        Ref range: 1.12 - 1.32 mmol*  Status: Final  pH, Arterial                                  Date: 11/11/2022  Value: 7.421       Ref range: 7.350 - 7.450      Status: Final  pCO2, Arterial                                Date: 11/11/2022  Value: 43          Ref range: 35 - 45 mm Hg      Status: Final  pO2, Arterial                                 Date: 11/11/2022  Value: 84 (A)      Ref range: 75 - 108 mm Hg     Status: Final  HCO3, Arterial                                Date: 11/11/2022  Value: 27.8        Ref range: 21.0 - 29.0 mmol*  Status: Final  Base Excess, Arterial                         Date: 11/11/2022  Value: 3           Ref range: -3 - 3             Status: Final  O2 Sat, Arterial                              Date: 11/11/2022  Value: 96 (A)      Ref range: 93 - 100 %         Status: Final  TCO2, Arterial                                Date: 11/11/2022  Value: 29          Ref range: 21 - 32 mmol/L     Status: Final  Lactate                                       Date: 11/11/2022  Value: 1.19        Ref range: 0.40 - 2.00 mmol*  Status: Final  POC Hematocrit                                Date: 11/11/2022  Value: 36          Ref range: 36 - 48 %          Status: Final  Hemoglobin                                    Date: 11/11/2022  Value: 12.3        Ref range: 12.0 - 16.0 gm/dL  Status: Final  FIO2                                          Date: 11/11/2022  Value: 2.000         Status: Final  Sample Type                                   Date: 11/11/2022  Value: ART           Status: Final  Performed on                                  Date: 11/11/2022  Value: SEE BELOW     Status: Final                Comment: Performed on POC  Sample Type: Arterial  Draw site: R Radial  Sixto's test: Positive  Oxygen Delivery System: Cannula    Troponin                                      Date: 11/11/2022  Value: <0.010      Ref range: 0.000 - 0.010 ng*  Status: Final                Comment: Methodology by Troponin T.   Color, UA                                     Date: 11/11/2022  Value: Yellow      Ref range: Straw/Yellow       Status: Final  Clarity, UA                                   Date: 11/11/2022  Value: Clear       Ref range: Clear              Status: Final  Glucose, Ur                                   Date: 11/11/2022  Value: Negative    Ref range: Negative mg/dL     Status: Final  Bilirubin Urine                               Date: 11/11/2022  Value: Negative    Ref range: Negative           Status: Final  Ketones, Urine                                Date: 11/11/2022  Value: Negative    Ref range: Negative mg/dL     Status: Final  Specific [place], UA                          Date: 11/11/2022  Value: 1.006       Ref range: 1.005 - 1.030      Status: Final  Blood, Urine                                  Date: 11/11/2022  Value: Negative    Ref range: Negative           Status: Final  pH, UA                                        Date: 11/11/2022  Value: 6.5         Ref range: 5.0 - 9.0          Status: Final  Protein, UA                                   Date: 11/11/2022  Value: Negative    Ref range: Negative mg/dL     Status: Final  Urobilinogen, Urine                           Date: 11/11/2022  Value: 0.2         Ref range: <2.0 E.U./dL       Status: Final  Nitrite, Urine                                Date: 11/11/2022  Value: Negative    Ref range: Negative           Status: Final  Leukocyte Esterase, Urine                     Date: 11/11/2022  Value: Negative    Ref range: Negative           Status: Final  WBC                                           Date: 11/12/2022  Value: 5.7         Ref range: 4.8 - 10.8 K/uL    Status: Final  RBC                                           Date: 11/12/2022  Value: 4.01 (A)    Ref range: 4.20 - 5.40 M/uL   Status: Final  Hemoglobin                                    Date: 11/12/2022  Value: 10.9 (A)    Ref range: 12.0 - 16.0 g/dL   Status: Final  Hematocrit                                    Date: 11/12/2022  Value: 33.1 (A)    Ref range: 37.0 - 47.0 %      Status: Final  MCV                                           Date: 11/12/2022  Value: 82.6        Ref range: 79.4 - 94.8 fL     Status: Final  MCH                                           Date: 11/12/2022  Value: 27.2        Ref range: 27.0 - 31.3 pg     Status: Final  MCHC                                          Date: 11/12/2022  Value: 32.9 (A)    Ref range: 33.0 - 37.0 %      Status: Final  RDW Date: 11/12/2022  Value: 17.5 (A)    Ref range: 11.5 - 14.5 %      Status: Final  Platelets                                     Date: 11/12/2022  Value: 168         Ref range: 130 - 400 K/uL     Status: Final  Neutrophils %                                 Date: 11/12/2022  Value: 73.2        Ref range: %                  Status: Final  Lymphocytes %                                 Date: 11/12/2022  Value: 11.2        Ref range: %                  Status: Final  Monocytes %                                   Date: 11/12/2022  Value: 12.0        Ref range: %                  Status: Final  Eosinophils %                                 Date: 11/12/2022  Value: 2.5         Ref range: %                  Status: Final  Basophils %                                   Date: 11/12/2022  Value: 1.1         Ref range: %                  Status: Final  Neutrophils Absolute                          Date: 11/12/2022  Value: 4.1         Ref range: 1.4 - 6.5 K/uL     Status: Final  Lymphocytes Absolute                          Date: 11/12/2022  Value: 0.6 (A)     Ref range: 1.0 - 4.8 K/uL     Status: Final  Monocytes Absolute                            Date: 11/12/2022  Value: 0.7         Ref range: 0.2 - 0.8 K/uL     Status: Final  Eosinophils Absolute                          Date: 11/12/2022  Value: 0.1         Ref range: 0.0 - 0.7 K/uL     Status: Final  Basophils Absolute                            Date: 11/12/2022  Value: 0.1         Ref range: 0.0 - 0.2 K/uL     Status: Final  Sodium                                        Date: 11/12/2022  Value: 142         Ref range: 135 - 144 mEq/L    Status: Final  Potassium                                     Date: 11/12/2022  Value: 3.4         Ref range: 3.4 - 4.9 mEq/L    Status: Final  Chloride                                      Date: 11/12/2022  Value: 101         Ref range: 95 - 107 mEq/L     Status: Final  CO2                                           Date: 11/12/2022  Value: 33 (A)      Ref range: 20 - 31 mEq/L      Status: Final  Anion Gap                                     Date: 11/12/2022  Value: 8 (A)       Ref range: 9 - 15 mEq/L       Status: Final  Glucose                                       Date: 11/12/2022  Value: 111 (A)     Ref range: 70 - 99 mg/dL      Status: Final  BUN                                           Date: 11/12/2022  Value: 14          Ref range: 8 - 23 mg/dL       Status: Final  Creatinine                                    Date: 11/12/2022  Value: 0.70        Ref range: 0.50 - 0.90 mg/dL  Status: Final  Est, Glom Filt Rate                           Date: 11/12/2022  Value: >60.0       Ref range: >60                Status: Final                Comment: Pediatric calculator link  [email or web address]. [email or web address]  Effective Oct 3, 2022  These results are not intended for use in patients  <25years of age. eGFR results are calculated without  a race factor using the 2021 CKD-EPI equation. Careful  clinical correlation is recommended, particularly when  comparing to results calculated using previous equations. The CKD-EPI equation is less accurate in patients with  extremes of muscle mass, extra-renal metabolism of  creatinine, excessive creatinine ingestion, or following  therapy that affects renal tubular secretion.     Calcium                                       Date: 11/12/2022  Value: 8.7         Ref range: 8.5 - 9.9 mg/dL    Status: Final  WBC                                           Date: 11/13/2022  Value: 6.4         Ref range: 4.8 - 10.8 K/uL    Status: Final  RBC                                           Date: 11/13/2022  Value: 4.07 (A)    Ref range: 4.20 - 5.40 M/uL   Status: Final  Hemoglobin                                    Date: 11/13/2022  Value: 11.0 (A)    Ref range: 12.0 - 16.0 g/dL   Status: Final  Hematocrit                                    Date: 11/13/2022  Value: 33.6 (A)    Ref range: 37.0 - 47.0 %      Status: Final  MCV                                           Date: 11/13/2022  Value: 82.5        Ref range: 79.4 - 94.8 fL     Status: Final  MCH                                           Date: 11/13/2022  Value: 27.1        Ref range: 27.0 - 31.3 pg     Status: Final  MCHC                                          Date: 11/13/2022  Value: 32.9 (A)    Ref range: 33.0 - 37.0 %      Status: Final  RDW                                           Date: 11/13/2022  Value: 17.5 (A)    Ref range: 11.5 - 14.5 %      Status: Final  Platelets                                     Date: 11/13/2022  Value: 160         Ref range: 130 - 400 K/uL     Status: Final  Neutrophils %                                 Date: 11/13/2022  Value: 73.6        Ref range: %                  Status: Final  Lymphocytes %                                 Date: 11/13/2022  Value: 9.6         Ref range: %                  Status: Final  Monocytes %                                   Date: 11/13/2022  Value: 12.4        Ref range: %                  Status: Final  Eosinophils %                                 Date: 11/13/2022  Value: 3.6         Ref range: %                  Status: Final  Basophils %                                   Date: 11/13/2022  Value: 0.8         Ref range: %                  Status: Final  Neutrophils Absolute                          Date: 11/13/2022  Value: 4.7         Ref range: 1.4 - 6.5 K/uL     Status: Final  Lymphocytes Absolute                          Date: 11/13/2022  Value: 0.6 (A)     Ref range: 1.0 - 4.8 K/uL     Status: Final  Monocytes Absolute                            Date: 11/13/2022  Value: 0.8         Ref range: 0.2 - 0.8 K/uL     Status: Final  Eosinophils Absolute                          Date: 11/13/2022  Value: 0.2         Ref range: 0.0 - 0.7 K/uL     Status: Final  Basophils Absolute                            Date: 11/13/2022  Value: 0.1         Ref range: 0.0 - 0.2 K/uL Status: Final  Sodium                                        Date: 11/13/2022  Value: 142         Ref range: 135 - 144 mEq/L    Status: Final  Potassium                                     Date: 11/13/2022  Value: 3.2 (A)     Ref range: 3.4 - 4.9 mEq/L    Status: Final  Chloride                                      Date: 11/13/2022  Value: 100         Ref range: 95 - 107 mEq/L     Status: Final  CO2                                           Date: 11/13/2022  Value: 36 (A)      Ref range: 20 - 31 mEq/L      Status: Final  Anion Gap                                     Date: 11/13/2022  Value: 6 (A)       Ref range: 9 - 15 mEq/L       Status: Final  Glucose                                       Date: 11/13/2022  Value: 109 (A)     Ref range: 70 - 99 mg/dL      Status: Final  BUN                                           Date: 11/13/2022  Value: 11          Ref range: 8 - 23 mg/dL       Status: Final  Creatinine                                    Date: 11/13/2022  Value: 0.51        Ref range: 0.50 - 0.90 mg/dL  Status: Final  Est, Glom Filt Rate                           Date: 11/13/2022  Value: >60.0       Ref range: >60                Status: Final                Comment: Pediatric calculator link  [email or web address]. [email or web address]  Effective Oct 3, 2022  These results are not intended for use in patients  <25years of age. eGFR results are calculated without  a race factor using the 2021 CKD-EPI equation. Careful  clinical correlation is recommended, particularly when  comparing to results calculated using previous equations. The CKD-EPI equation is less accurate in patients with  extremes of muscle mass, extra-renal metabolism of  creatinine, excessive creatinine ingestion, or following  therapy that affects renal tubular secretion.     Calcium                                       Date: 11/13/2022  Value: 8.7         Ref range: 8.5 - 9.9 mg/dL    Status: Final  Magnesium Date: 11/13/2022  Value: 1.6 (A)     Ref range: 1.7 - 2.4 mg/dL    Status: Final  WBC                                           Date: 11/14/2022  Value: 7.2         Ref range: 4.8 - 10.8 K/uL    Status: Final  RBC                                           Date: 11/14/2022  Value: 4.45        Ref range: 4.20 - 5.40 M/uL   Status: Final  Hemoglobin                                    Date: 11/14/2022  Value: 11.8 (A)    Ref range: 12.0 - 16.0 g/dL   Status: Final  Hematocrit                                    Date: 11/14/2022  Value: 37.1        Ref range: 37.0 - 47.0 %      Status: Final  MCV                                           Date: 11/14/2022  Value: 83.4        Ref range: 79.4 - 94.8 fL     Status: Final  MCH                                           Date: 11/14/2022  Value: 26.4 (A)    Ref range: 27.0 - 31.3 pg     Status: Final  MCHC                                          Date: 11/14/2022  Value: 31.7 (A)    Ref range: 33.0 - 37.0 %      Status: Final  RDW                                           Date: 11/14/2022  Value: 17.3 (A)    Ref range: 11.5 - 14.5 %      Status: Final  Platelets                                     Date: 11/14/2022  Value: 184         Ref range: 130 - 400 K/uL     Status: Final  Neutrophils %                                 Date: 11/14/2022  Value: 75.3        Ref range: %                  Status: Final  Lymphocytes %                                 Date: 11/14/2022  Value: 7.9         Ref range: %                  Status: Final  Monocytes %                                   Date: 11/14/2022  Value: 10.8        Ref range: %                  Status: Final  Eosinophils %                                 Date: 11/14/2022  Value: 5.1         Ref range: %                  Status: Final  Basophils %                                   Date: 11/14/2022  Value: 0.9         Ref range: %                  Status: Final  Neutrophils Absolute                          Date: 11/14/2022  Value: 5.4         Ref range: 1.4 - 6.5 K/uL     Status: Final  Lymphocytes Absolute                          Date: 11/14/2022  Value: 0.6 (A)     Ref range: 1.0 - 4.8 K/uL     Status: Final  Monocytes Absolute                            Date: 11/14/2022  Value: 0.8         Ref range: 0.2 - 0.8 K/uL     Status: Final  Eosinophils Absolute                          Date: 11/14/2022  Value: 0.4         Ref range: 0.0 - 0.7 K/uL     Status: Final  Basophils Absolute                            Date: 11/14/2022  Value: 0.1         Ref range: 0.0 - 0.2 K/uL     Status: Final  Sodium                                        Date: 11/14/2022  Value: 141         Ref range: 135 - 144 mEq/L    Status: Final  Potassium                                     Date: 11/14/2022  Value: 3.0 (A)     Ref range: 3.4 - 4.9 mEq/L    Status: Final  Chloride                                      Date: 11/14/2022  Value: 96          Ref range: 95 - 107 mEq/L     Status: Final  CO2                                           Date: 11/14/2022  Value: 36 (A)      Ref range: 20 - 31 mEq/L      Status: Final  Anion Gap                                     Date: 11/14/2022  Value: 9           Ref range: 9 - 15 mEq/L       Status: Final  Glucose                                       Date: 11/14/2022  Value: 109 (A)     Ref range: 70 - 99 mg/dL      Status: Final  BUN                                           Date: 11/14/2022  Value: 8           Ref range: 8 - 23 mg/dL       Status: Final  Creatinine                                    Date: 11/14/2022  Value: 0.46 (A)    Ref range: 0.50 - 0.90 mg/dL  Status: Final  Est, Glom Filt Rate                           Date: 11/14/2022  Value: >60.0       Ref range: >60                Status: Final                Comment: Pediatric calculator link  [email or web address]/gfr_calculatorped  Effective Oct 3, 2022  These results are not intended for use in patients  <25years of age. eGFR results are calculated without  a race factor using the 2021 CKD-EPI equation. Careful  clinical correlation is recommended, particularly when  comparing to results calculated using previous equations. The CKD-EPI equation is less accurate in patients with  extremes of muscle mass, extra-renal metabolism of  creatinine, excessive creatinine ingestion, or following  therapy that affects renal tubular secretion.     Calcium                                       Date: 11/14/2022  Value: 8.5         Ref range: 8.5 - 9.9 mg/dL    Status: Final  Magnesium                                     Date: 11/14/2022  Value: 1.8         Ref range: 1.7 - 2.4 mg/dL    Status: Final  Magnesium                                     Date: 11/15/2022  Value: 1.9         Ref range: 1.7 - 2.4 mg/dL    Status: Final  Sodium                                        Date: 11/15/2022  Value: 140         Ref range: 135 - 144 mEq/L    Status: Final  Potassium                                     Date: 11/15/2022  Value: 3.9         Ref range: 3.4 - 4.9 mEq/L    Status: Final  Chloride                                      Date: 11/15/2022  Value: 95          Ref range: 95 - 107 mEq/L     Status: Final  CO2                                           Date: 11/15/2022  Value: 26          Ref range: 20 - 31 mEq/L      Status: Final  Anion Gap                                     Date: 11/15/2022  Value: 19 (A)      Ref range: 9 - 15 mEq/L       Status: Final  Glucose                                       Date: 11/15/2022  Value: 94          Ref range: 70 - 99 mg/dL      Status: Final  BUN                                           Date: 11/15/2022  Value: 10          Ref range: 8 - 23 mg/dL       Status: Final  Creatinine                                    Date: 11/15/2022  Value: 0.55        Ref range: 0.50 - 0.90 mg/dL  Status: Final  Est, Glom Filt Rate                           Date: 11/15/2022  Value: >60.0       Ref range: >60 Status: Final                Comment: Pediatric calculator link  [email or web address]. [email or web address]/gfr_calculatorped  Effective Oct 3, 2022  These results are not intended for use in patients  <25years of age. eGFR results are calculated without  a race factor using the 2021 CKD-EPI equation. Careful  clinical correlation is recommended, particularly when  comparing to results calculated using previous equations. The CKD-EPI equation is less accurate in patients with  extremes of muscle mass, extra-renal metabolism of  creatinine, excessive creatinine ingestion, or following  therapy that affects renal tubular secretion.     Calcium                                       Date: 11/15/2022  Value: 8.9         Ref range: 8.5 - 9.9 mg/dL    Status: Final  Magnesium                                     Date: 11/16/2022  Value: 1.9         Ref range: 1.7 - 2.4 mg/dL    Status: Final  Sodium                                        Date: 11/16/2022  Value: 139         Ref range: 135 - 144 mEq/L    Status: Final  Potassium                                     Date: 11/16/2022  Value: 3.3 (A)     Ref range: 3.4 - 4.9 mEq/L    Status: Final  Chloride                                      Date: 11/16/2022  Value: 98          Ref range: 95 - 107 mEq/L     Status: Final  CO2                                           Date: 11/16/2022  Value: 32 (A)      Ref range: 20 - 31 mEq/L      Status: Final  Anion Gap                                     Date: 11/16/2022  Value: 9           Ref range: 9 - 15 mEq/L       Status: Final  Glucose                                       Date: 11/16/2022  Value: 97          Ref range: 70 - 99 mg/dL      Status: Final  BUN                                           Date: 11/16/2022  Value: 11          Ref range: 8 - 23 mg/dL       Status: Final  Creatinine                                    Date: 11/16/2022  Value: 0.52        Ref range: 0.50 - 0.90 mg/dL  Status: Final  Est, Glom Filt Rate Date: 11/16/2022  Value: >60.0       Ref range: >60                Status: Final                Comment: Pediatric calculator link  [email or web address]. [email or web address]/gfr_calculatorped  Effective Oct 3, 2022  These results are not intended for use in patients  <25years of age. eGFR results are calculated without  a race factor using the 2021 CKD-EPI equation. Careful  clinical correlation is recommended, particularly when  comparing to results calculated using previous equations. The CKD-EPI equation is less accurate in patients with  extremes of muscle mass, extra-renal metabolism of  creatinine, excessive creatinine ingestion, or following  therapy that affects renal tubular secretion. Calcium                                       Date: 11/16/2022  Value: 8.8         Ref range: 8.5 - 9.9 mg/dL    Status: Final  ------------    Radiology last 7 days:  CTA CHEST W WO CONTRAST PE Eval    Result Date: 11/11/2022  No evidence of pulmonary embolism. Severe cardiomegaly with evidence of congestive heart failure manifesting as mild edema and bilateral trace pleural effusions as well as significant right heart dysfunction including partially visualized perihepatic ascites. XR CHEST PORTABLE    Result Date: 11/11/2022  Cardiomegaly with multifocal bilateral airspace disease favored to represent CHF Left lung base consolidation which could represent atelectasis, pleural effusion or less likely pneumonia. Pink Dyllan         Pending Labs     Order Current Status    POCT EPOC BLOOD GAS, LACTIC ACID, ICA Run All Components: Yes Collected   (11/11/22 1237)        Discharge Medications    Current Discharge Medication List        Current Discharge Medication List        Current Discharge Medication List    CONTINUE these medications which have NOT CHANGED    Lactobacillus TABS  Take by mouth daily    piperacillin-tazobactam (ZOSYN) 4-0.5 GM per 100ML IVPB  Infuse 13.5 mg intravenously daily Pt  get 13.5 grams  over 24 hours  via easy pump    Lactobacillus Acidophilus POWD  by Does not apply route    rivaroxaban (XARELTO) 15 MG TABS tablet  Take 1 tablet by mouth daily  Qty: 90 tablet Refills: 3  Associated Diagnoses:Paroxysmal atrial fibrillation (HCC)    meclizine (ANTIVERT) 25 MG tablet  Take by mouth daily    acetaminophen (TYLENOL) 325 MG tablet  Take 650 mg by mouth every 6 hours as needed for Pain    Vitamin D (CHOLECALCIFEROL) 50 MCG (2000 UT) TABS tablet  Take 1 tablet by mouth Daily with supper  Qty: 60 tablet Refills: 5  Comments: Labeling may look different. 25 mcg=1000 Units. Please double check dosages. potassium chloride (KLOR-CON M) 20 MEQ extended release tablet  Take 20 mEq by mouth daily (with breakfast) Indications: Nutritional Support    furosemide (LASIX) 20 MG tablet  TAKE 1 TABLET DAILY  Qty: 90 tablet Refills: 3  Associated Diagnoses:Essential hypertension    pantoprazole (PROTONIX) 40 MG tablet  TAKE 1 TABLET DAILY  Qty: 90 tablet Refills: 3  Associated Diagnoses:Essential hypertension    carvedilol (COREG) 6.25 MG tablet  TAKE 1 TABLET TWICE A DAY  Qty: 180 tablet Refills: 3  Associated Diagnoses:Essential hypertension    nitroGLYCERIN (NITROSTAT) 0.4 MG SL tablet  up to max of 3 total doses. If no relief after 1 dose, call 911.   Qty: 25 tablet Refills: 3    ferrous sulfate (IRON 325) 325 (65 Fe) MG tablet  Take 1 tablet by mouth 2 times daily (with meals)  Qty: 30 tablet Refills: 3    Carboxymethylcellulose Sodium (EYE DROPS OP)  Apply 1 drop to eye as needed (Dry eyes) Indications: Systane     Boswellia-Glucosamine-Vit D (OSTEO BI-FLEX ONE PER DAY) TABS  Take 1 tablet by mouth daily Indications: Nutritional Support    Multiple Vitamins-Minerals (CENTRUM SILVER ULTRA WOMENS) TABS  Take 1 tablet by mouth daily Indications: Nutritional Support    vitamin B-12 (CYANOCOBALAMIN) 1000 MCG tablet  Take 1,000 mcg by mouth daily Indications: Nutritional Support    citalopram (CELEXA) 20 MG tablet  Take 20 mg by mouth daily Indications: Depression    SYNTHROID 88 MCG tablet  Take 88 mcg by mouth daily Indications: Underactive Thyroid          Current Discharge Medication List    STOP taking these medications    digoxin (LANOXIN) 125 MCG tablet  Comments:  Reason for Stopping:    amLODIPine (NORVASC) 5 MG tablet  Comments:  Reason for Stopping:    budesonide-formoterol (SYMBICORT) 160-4.5 MCG/ACT AERO  Comments:  Reason for Stopping:          Time Spent on Discharge:  minutes were spent in patient examination, evaluation, counseling as well as medication reconciliation, prescriptions for required medications, discharge plan, and follow up.     Electronically signed by Marco Antonio Coreas MD on 11/16/22 at 9:42 AM EST   Overtime on dc summary was 45 mn

## 2022-11-16 NOTE — CARE COORDINATION
Met with patient, son at bedside, to discuss Inpatient Rehab referral and review program requirements, including 3 hours of intense therapy daily, anticipated length of stay and goal of discharge to home. [place] of choice provided and patient wants [facility] to work on strengthening and breathing. Per son, she is still getting SOB with activity and they are concerned she will not do as well at home with [facility]. Patient agrees to work with therapy and is happy to come to Rehab. Case reviewed with PM&R Dr Brendan Cervantes and patient ok to admit to [facility] ([facility] for short stay. Patient can transfer to [facility] pending medical clearance and negative covid. Secure VM left for [facility], [facility].  Electronically signed by Zara Barnett RN on 11/16/22 at 12:41 PM EST

## 2022-11-16 NOTE — FLOWSHEET NOTE
Report called to Rehab RN. Per RN, room is not clean yet so patient will eat dinner on 1 west before transferring to Rehab.      Electronically signed by Jose Pastrana RN on 11/16/2022 at 4:09 PM

## 2022-11-16 NOTE — PROGRESS NOTES
Subjective: The patient complains of severe acute on chronic progressive fatigue and abd pain and BLE weakness partially relieved by rest, PT, OT and meds   and exacerbated by exertion and recent illness. 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 *      .    Reviewed recent nursing note and discussed current status and planned care with acute care providers, \"AWAITING PT EVAL. SPOKE WITH MANJU TOBIAS [facility], IF PT RETURNS HOME PT WILL NEED ALL NEW [facility] ORDER(SN-IV'S, PT/OT/AIDE) \". ROS x10: The patient also complains of severely impaired mobility and activities of daily living. Otherwise no new problems with vision, hearing, nose, mouth, throat, dermal, cardiovascular, GI, , pulmonary, musculoskeletal, psychiatric or neurological.        Vital signs:  BP (!) 140/86   Pulse 75   Temp 97.7 °F (36.5 °C) (Oral)   Resp 16   Ht 4' 11\" (1.499 m)   Wt 150 lb 4.8 oz (68.2 kg)   SpO2 98%   BMI 30.36 kg/m²   I/O:   PO/Intake:    fair PO intake, continue to monitor closely for dehydration    Bowel/Bladder:   continent,    General:  Patient is well developed, adequately nourished, and    well kempt. HEENT:    PERRLA, hearing intact to loud voice, external inspection of ear and nose benign. Inspection of lips, tongue and gums benign  Musculoskeletal: No significant change in strength or tone. All joints stable. Inspection and palpation of digits and nails show no clubbing, cyanosis or inflammatory conditions.    Neuro/Psychiatric: Affect: flat-  Alert and oriented to self and situation with  min cues. No significant change in deep tendon reflexes or sensation  Lungs:  Diminished, CTA-B  . Respiration effort is normal at rest.   Heart:   S1 = S2,   RRR. Abdomen:  Soft,  mild-tender    Extremities:  Trace  lower extremity edema but no unusual tenderness. Skin:   BUE bruises dt blood draws      Rehabilitation:  Physical Therapy:   Bed mobility:  Bed mobility  Bed Mobility Comments: Pt sitting up at EOB start of tx, transfer to chair at end of tx (11/15/22 1604)  Transfers:  Transfers  Sit to Stand: Stand by assistance (11/15/22 1604)  Stand to Sit: Stand by assistance (11/15/22 1604)  Bed to Chair: Stand by assistance (11/15/22 1604)  Comment: VCs for handplacement to increase ease and safety. (11/15/22 1604)  Gait:   Ambulation  Surface: Level tile (11/15/22 1605)  Device: Rolling Walker (11/15/22 1605)  Other Apparatus: AFO; Left;Right;O2 (11/15/22 1605)  Assistance: Stand by assistance (11/15/22 1605)  Quality of Gait: Assist to manage O2 line. (11/15/22 1605)  Gait Deviations: Slow Lea;Decreased step length (11/15/22 1605)  Distance: 30ftx2 (11/15/22 1605)  Comments: Dependent to Stalin Johnson B AFOs. SPO2 97% HR 80BPM on 4L O2.  VCs and demo for [employer] safety with approach to chair.  (11/15/22 1605)  Stairs:     W/C mobility:       Occupational Therapy:   Hand Dominance: Right  ADL  Feeding: Independent (11/14/22 1255)  Grooming: Supervision (11/14/22 1255)  UE Bathing: Stand by assistance (11/14/22 1255)  LE Bathing: Minimal assistance (11/14/22 1255)  UE Dressing: Stand by assistance (11/14/22 1255)  LE Dressing: Minimal assistance (11/14/22 1255)  Toileting: Minimal assistance (11/14/22 1255)  Additional Comments: Simulated ADLs as above, limited d/t fatigue and B AFOs not present (11/14/22 1255)  Toilet Transfers  Toilet Transfer: Unable to assess (11/14/22 1257)  Toilet Transfers Comments: Anticipate min A (11/14/22 1257)          Speech Therapy: Diet/Swallow:                   COGNITION  OT: Cognition Comment: Appears WFL, has limited English proficiency but able to follow visual cues  SP:           Lab/X-ray studies reviewed, analyzed and discussed with patient and staff:   Recent Results (from the past 24 hour(s))   Magnesium    Collection Time: 11/16/22  5:29 AM   Result Value Ref Range    Magnesium 1.9 1.7 - 2.4 mg/dL   Basic Metabolic Panel    Collection Time: 11/16/22  5:29 AM   Result Value Ref Range    Sodium 139 135 - 144 mEq/L    Potassium 3.3 (L) 3.4 - 4.9 mEq/L    Chloride 98 95 - 107 mEq/L    CO2 32 (H) 20 - 31 mEq/L    Anion Gap 9 9 - 15 mEq/L    Glucose 97 70 - 99 mg/dL    BUN 11 8 - 23 mg/dL    Creatinine 0.52 0.50 - 0.90 mg/dL    Est, Glom Filt Rate >60.0 >60    Calcium 8.8 8.5 - 9.9 mg/dL     Previous extensive, complex labs, notes and diagnostics reviewed and analyzed. ALLERGIES:    Allergies as of 11/11/2022 - Fully Reviewed 11/11/2022   Allergen Reaction Noted    Latex  07/19/2017    Tape [adhesive tape]  06/28/2016      (please also verify by checking [facility])     Complex Physical Medicine & Rehab Issues Assess & Plan:   Severe abnormality of gait and mobility and impaired self-care and ADL's secondary to   CMT neuropathy . Updated functional and medical status reassessed regarding patients ability to participate in therapies and patient found to be able to participate in:       acute intensive comprehensive inpatient rehabilitation program including PT/OT to improve balance, ambulation, ADLs, and to improve the P/AROM. It is my opinion that they will be able to tolerate 3 hours of therapy a day and benefit from it at an acute level. I again discussed acute rehab with the patient and verify that the patient is able and willing to participate in 3 hours of therapy a day. Rehab and Acute Care Case Management has also reinforced this expectation.     Will continue to follow to attempt to get patient to the most efficient but most effective level of care will be in their best interest.  Continue to focus on energy conservation heart rate and blood pressure monitoring before during and after therapy endurance and consistency of function. Bowel constipation   and Bladder dysfunction   overactive, neurogenic bladder:  frequent toileting, ambulate to bathroom with assistance, check post void residuals. Check for C.difficile x1 if >2 loose stools in 24 hours, continue bowel & bladder program.  Monitor for UTI symptoms including lethargy and confusion    Severe abd pain and generalized OA pain: reassess pain every shift and prior to and after each therapy session, give prn Tylenol   and consider scheduled Tylenol, modalities prn in therapy, consider Lidoderm, K-pad prn. Skin healing abd wound breakdown   risk:  continue pressure relief program.  Daily skin exams and reports from nursing. Severe fatigue due to immobility and nutritional deficits: monitoring for dysphagia   Add vitamin B12 vitamin D and CoQ10 titrate dosing and add protein supplementation with low carb content. Complex discharge planning:   Discussed with care team-last 24 hour events noted. I will continue to follow along and reassess functional and medical status as we strive to improve patient's functional and medical outcomes progressing to the most efficient and lowest level of care. Complex Active General Medical Issues that complicate care:     1. 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  *          Events and functional changes in the past 24 hours reviewed improvements in functional status are encouraging       Focus of today's plan-  OK only if pt agrees to 3 hours a day. Short 5-7 day stay.       Gustavo Prado D.O., PM&R     Attending    [address]

## 2022-11-17 PROBLEM — Z71.89 ADVANCED CARE PLANNING/COUNSELING DISCUSSION: Status: ACTIVE | Noted: 2022-11-17

## 2022-11-17 PROBLEM — Z74.09 IMPAIRED MOBILITY AND ADLS: Status: ACTIVE | Noted: 2022-11-17

## 2022-11-17 PROBLEM — Z51.5 PALLIATIVE CARE ENCOUNTER: Status: ACTIVE | Noted: 2022-11-17

## 2022-11-17 PROBLEM — Z78.9 IMPAIRED MOBILITY AND ADLS: Status: ACTIVE | Noted: 2022-01-01

## 2022-11-17 PROBLEM — Z71.89 GOALS OF CARE, COUNSELING/DISCUSSION: Status: ACTIVE | Noted: 2022-11-17

## 2022-11-17 LAB
ANION GAP SERPL CALCULATED.3IONS-SCNC: 10 MEQ/L (ref 9–15)
ANION GAP SERPL CALCULATED.3IONS-SCNC: 13 MEQ/L (ref 9–15)
BASOPHILS ABSOLUTE: 0.1 K/UL (ref 0–0.2)
BASOPHILS RELATIVE PERCENT: 0.7 %
BUN BLDV-MCNC: 11 MG/DL (ref 8–23)
BUN BLDV-MCNC: 12 MG/DL (ref 8–23)
C-REACTIVE PROTEIN, HIGH SENSITIVITY: 39.2 MG/L (ref 0–5)
CALCIUM SERPL-MCNC: 8.8 MG/DL (ref 8.5–9.9)
CALCIUM SERPL-MCNC: 8.9 MG/DL (ref 8.5–9.9)
CHLORIDE BLD-SCNC: 100 MEQ/L (ref 95–107)
CHLORIDE BLD-SCNC: 94 MEQ/L (ref 95–107)
CO2: 28 MEQ/L (ref 20–31)
CO2: 29 MEQ/L (ref 20–31)
CREAT SERPL-MCNC: 0.49 MG/DL (ref 0.5–0.9)
CREAT SERPL-MCNC: 0.54 MG/DL (ref 0.5–0.9)
EOSINOPHILS ABSOLUTE: 0.3 K/UL (ref 0–0.7)
EOSINOPHILS RELATIVE PERCENT: 3.5 %
GFR SERPL CREATININE-BSD FRML MDRD: >60 ML/MIN/{1.73_M2}
GFR SERPL CREATININE-BSD FRML MDRD: >60 ML/MIN/{1.73_M2}
GLUCOSE BLD-MCNC: 113 MG/DL (ref 70–99)
GLUCOSE BLD-MCNC: 113 MG/DL (ref 70–99)
HCT VFR BLD CALC: 37.5 % (ref 37–47)
HEMOGLOBIN: 12.3 G/DL (ref 12–16)
LYMPHOCYTES ABSOLUTE: 1 K/UL (ref 1–4.8)
LYMPHOCYTES RELATIVE PERCENT: 13.8 %
MAGNESIUM: 1.8 MG/DL (ref 1.7–2.4)
MCH RBC QN AUTO: 27.1 PG (ref 27–31.3)
MCHC RBC AUTO-ENTMCNC: 32.8 % (ref 33–37)
MCV RBC AUTO: 82.7 FL (ref 79.4–94.8)
MONOCYTES ABSOLUTE: 0.8 K/UL (ref 0.2–0.8)
MONOCYTES RELATIVE PERCENT: 10.1 %
NEUTROPHILS ABSOLUTE: 5.5 K/UL (ref 1.4–6.5)
NEUTROPHILS RELATIVE PERCENT: 71.9 %
PDW BLD-RTO: 17.7 % (ref 11.5–14.5)
PLATELET # BLD: 213 K/UL (ref 130–400)
POTASSIUM REFLEX MAGNESIUM: 4.1 MEQ/L (ref 3.4–4.9)
POTASSIUM SERPL-SCNC: 3.9 MEQ/L (ref 3.4–4.9)
RBC # BLD: 4.54 M/UL (ref 4.2–5.4)
SODIUM BLD-SCNC: 135 MEQ/L (ref 135–144)
SODIUM BLD-SCNC: 139 MEQ/L (ref 135–144)
WBC # BLD: 7.6 K/UL (ref 4.8–10.8)

## 2022-11-17 PROCEDURE — 97535 SELF CARE MNGMENT TRAINING: CPT

## 2022-11-17 PROCEDURE — 6360000002 HC RX W HCPCS: Performed by: PHYSICAL MEDICINE & REHABILITATION

## 2022-11-17 PROCEDURE — 97140 MANUAL THERAPY 1/> REGIONS: CPT

## 2022-11-17 PROCEDURE — 83735 ASSAY OF MAGNESIUM: CPT

## 2022-11-17 PROCEDURE — 6370000000 HC RX 637 (ALT 250 FOR IP): Performed by: PHYSICAL MEDICINE & REHABILITATION

## 2022-11-17 PROCEDURE — 99223 1ST HOSP IP/OBS HIGH 75: CPT | Performed by: PHYSICAL MEDICINE & REHABILITATION

## 2022-11-17 PROCEDURE — 99222 1ST HOSP IP/OBS MODERATE 55: CPT | Performed by: INTERNAL MEDICINE

## 2022-11-17 PROCEDURE — 86141 C-REACTIVE PROTEIN HS: CPT

## 2022-11-17 PROCEDURE — 6370000000 HC RX 637 (ALT 250 FOR IP): Performed by: INTERNAL MEDICINE

## 2022-11-17 PROCEDURE — 97116 GAIT TRAINING THERAPY: CPT

## 2022-11-17 PROCEDURE — 6360000002 HC RX W HCPCS: Performed by: INTERNAL MEDICINE

## 2022-11-17 PROCEDURE — 36415 COLL VENOUS BLD VENIPUNCTURE: CPT

## 2022-11-17 PROCEDURE — 85025 COMPLETE CBC W/AUTO DIFF WBC: CPT

## 2022-11-17 PROCEDURE — 1180000000 HC REHAB R&B

## 2022-11-17 PROCEDURE — 97162 PT EVAL MOD COMPLEX 30 MIN: CPT

## 2022-11-17 PROCEDURE — 97112 NEUROMUSCULAR REEDUCATION: CPT

## 2022-11-17 PROCEDURE — 80048 BASIC METABOLIC PNL TOTAL CA: CPT

## 2022-11-17 PROCEDURE — 2580000003 HC RX 258: Performed by: INTERNAL MEDICINE

## 2022-11-17 PROCEDURE — 99222 1ST HOSP IP/OBS MODERATE 55: CPT

## 2022-11-17 PROCEDURE — 97166 OT EVAL MOD COMPLEX 45 MIN: CPT

## 2022-11-17 PROCEDURE — 97530 THERAPEUTIC ACTIVITIES: CPT

## 2022-11-17 RX ORDER — LIDOCAINE 4 G/G
3 PATCH TOPICAL DAILY
Status: DISCONTINUED | OUTPATIENT
Start: 2022-11-17 | End: 2022-11-20

## 2022-11-17 RX ORDER — BISACODYL 10 MG
10 SUPPOSITORY, RECTAL RECTAL DAILY PRN
Status: DISCONTINUED | OUTPATIENT
Start: 2022-11-17 | End: 2022-11-23 | Stop reason: HOSPADM

## 2022-11-17 RX ORDER — UBIDECARENONE 100 MG
100 CAPSULE ORAL DAILY
Status: DISCONTINUED | OUTPATIENT
Start: 2022-11-17 | End: 2022-11-23 | Stop reason: HOSPADM

## 2022-11-17 RX ORDER — VITAMIN B COMPLEX
2000 TABLET ORAL
Status: DISCONTINUED | OUTPATIENT
Start: 2022-11-17 | End: 2022-11-17

## 2022-11-17 RX ORDER — CYANOCOBALAMIN 1000 UG/ML
1000 INJECTION, SOLUTION INTRAMUSCULAR; SUBCUTANEOUS WEEKLY
Status: DISCONTINUED | OUTPATIENT
Start: 2022-11-17 | End: 2022-11-23 | Stop reason: HOSPADM

## 2022-11-17 RX ORDER — SODIUM PHOSPHATE, DIBASIC AND SODIUM PHOSPHATE, MONOBASIC 7; 19 G/133ML; G/133ML
1 ENEMA RECTAL DAILY PRN
Status: DISCONTINUED | OUTPATIENT
Start: 2022-11-17 | End: 2022-11-23 | Stop reason: HOSPADM

## 2022-11-17 RX ORDER — ACETAMINOPHEN 325 MG/1
650 TABLET ORAL EVERY 4 HOURS PRN
Status: DISCONTINUED | OUTPATIENT
Start: 2022-11-17 | End: 2022-11-23 | Stop reason: HOSPADM

## 2022-11-17 RX ADMIN — POTASSIUM CHLORIDE 20 MEQ: 1500 TABLET, EXTENDED RELEASE ORAL at 19:48

## 2022-11-17 RX ADMIN — LACTOBACILLUS TAB 4 TABLET: TAB at 19:48

## 2022-11-17 RX ADMIN — Medication 10 ML: at 07:55

## 2022-11-17 RX ADMIN — ALTEPLASE 1 MG: 2.2 INJECTION, POWDER, LYOPHILIZED, FOR SOLUTION INTRAVENOUS at 12:39

## 2022-11-17 RX ADMIN — SACUBITRIL AND VALSARTAN 1 TABLET: 24; 26 TABLET, FILM COATED ORAL at 07:39

## 2022-11-17 RX ADMIN — SUCRALFATE 1 G: 1 TABLET ORAL at 11:04

## 2022-11-17 RX ADMIN — PANTOPRAZOLE SODIUM 40 MG: 40 TABLET, DELAYED RELEASE ORAL at 07:39

## 2022-11-17 RX ADMIN — CITALOPRAM HYDROBROMIDE 20 MG: 20 TABLET ORAL at 07:39

## 2022-11-17 RX ADMIN — ALTEPLASE 1 MG: 2.2 INJECTION, POWDER, LYOPHILIZED, FOR SOLUTION INTRAVENOUS at 15:06

## 2022-11-17 RX ADMIN — CARVEDILOL 6.25 MG: 6.25 TABLET, FILM COATED ORAL at 07:39

## 2022-11-17 RX ADMIN — Medication 10 ML: at 03:19

## 2022-11-17 RX ADMIN — Medication 2000 UNITS: at 17:07

## 2022-11-17 RX ADMIN — LACTOBACILLUS TAB 4 TABLET: TAB at 14:38

## 2022-11-17 RX ADMIN — POTASSIUM CHLORIDE 20 MEQ: 1500 TABLET, EXTENDED RELEASE ORAL at 07:38

## 2022-11-17 RX ADMIN — CARVEDILOL 6.25 MG: 6.25 TABLET, FILM COATED ORAL at 19:47

## 2022-11-17 RX ADMIN — CYANOCOBALAMIN 1000 MCG: 1000 INJECTION, SOLUTION INTRAMUSCULAR; SUBCUTANEOUS at 07:39

## 2022-11-17 RX ADMIN — PIPERACILLIN AND TAZOBACTAM 3375 MG: 3; .375 INJECTION, POWDER, FOR SOLUTION INTRAVENOUS at 17:12

## 2022-11-17 RX ADMIN — LEVOTHYROXINE SODIUM 88 MCG: 88 TABLET ORAL at 05:52

## 2022-11-17 RX ADMIN — Medication 100 MG: at 07:38

## 2022-11-17 RX ADMIN — SACUBITRIL AND VALSARTAN 1 TABLET: 24; 26 TABLET, FILM COATED ORAL at 19:48

## 2022-11-17 RX ADMIN — SUCRALFATE 1 G: 1 TABLET ORAL at 05:52

## 2022-11-17 RX ADMIN — SUCRALFATE 1 G: 1 TABLET ORAL at 17:07

## 2022-11-17 RX ADMIN — FERROUS SULFATE TAB 325 MG (65 MG ELEMENTAL FE) 325 MG: 325 (65 FE) TAB at 17:07

## 2022-11-17 RX ADMIN — Medication 10 ML: at 19:48

## 2022-11-17 RX ADMIN — FUROSEMIDE 40 MG: 40 TABLET ORAL at 07:39

## 2022-11-17 RX ADMIN — RIVAROXABAN 15 MG: 15 TABLET, FILM COATED ORAL at 07:39

## 2022-11-17 RX ADMIN — DIGOXIN 125 MCG: 125 TABLET ORAL at 07:38

## 2022-11-17 RX ADMIN — PIPERACILLIN AND TAZOBACTAM 3375 MG: 3; .375 INJECTION, POWDER, FOR SOLUTION INTRAVENOUS at 07:48

## 2022-11-17 RX ADMIN — LACTOBACILLUS TAB 4 TABLET: TAB at 07:39

## 2022-11-17 RX ADMIN — FERROUS SULFATE TAB 325 MG (65 MG ELEMENTAL FE) 325 MG: 325 (65 FE) TAB at 07:39

## 2022-11-17 ASSESSMENT — ENCOUNTER SYMPTOMS
CONSTIPATION: 1
GASTROINTESTINAL NEGATIVE: 1
ABDOMINAL PAIN: 1
EYES NEGATIVE: 1
PHOTOPHOBIA: 0
EYE REDNESS: 0
BACK PAIN: 1
APNEA: 0
WHEEZING: 0
CHOKING: 0
ALLERGIC/IMMUNOLOGIC NEGATIVE: 1
BLOOD IN STOOL: 0
COUGH: 0
NAUSEA: 0
RESPIRATORY NEGATIVE: 1
SORE THROAT: 0
DIARRHEA: 0
CHEST TIGHTNESS: 0
SHORTNESS OF BREATH: 1
SHORTNESS OF BREATH: 0
STRIDOR: 0
EYE PAIN: 0
VOMITING: 0

## 2022-11-17 ASSESSMENT — VISUAL ACUITY: OU: 1

## 2022-11-17 ASSESSMENT — PAIN SCALES - GENERAL: PAINLEVEL_OUTOF10: 2

## 2022-11-17 ASSESSMENT — PAIN SCALES - WONG BAKER: WONGBAKER_NUMERICALRESPONSE: 2

## 2022-11-17 NOTE — PLAN OF CARE
Problem: Discharge Planning  Goal: Discharge to home or other facility with appropriate resources  Outcome: Progressing  Flowsheets (Taken 11/16/2022 2010)  Discharge to home or other facility with appropriate resources: Identify barriers to discharge with patient and caregiver

## 2022-11-17 NOTE — PROGRESS NOTES
Physical Therapy  Facility/Department: [id number]  Physical Therapy Discharge      NAME: Sergio Sabillon    : 1932 (80 y.o.)  MRN: [id number]    Account: [de-identified]  Gender: female      Patient has been discharged from acute care hospital. DC patient from current PT program.      Electronically signed by Yolanda Christian PT on 22 at 9:31 AM EST

## 2022-11-17 NOTE — CONSULTS
Palliative Care Consult Note  Patient: Maria C Powell  Gender: female  YOB: 1932  Unit/Bed: P505/J991-30  CodeStatus: Full Code  Inpatient Treatment Team: Treatment Team: Attending Provider: Catherine Apple DO; Consulting Physician: Gene Pillai MD; Patient Care Tech: Marylen Proffer; Respiratory Therapist (Day): Nubia Thakur RCP; Registered Nurse: Nuvia Parra RN; Nurse Practitioner: Jose Hsu APRN - CNS; Consulting Physician: Bill Tate MD; Consulting Physician: Elvira Gray DO; Patient Care Tech: Rod Bomariola  Admit Date:  11/16/2022    Chief Complaint: Shortness of breath shortness of breath    History of Presenting Illness:      Maria C Powell is a 80 y.o. female on hospital day 1 with a history of chronic respiratory failure on home oxygen 2-3 L, shortness of breath, CHF, atrial fibrillation, hypertension, abdominal abscess, CAD, ascending aortic aneurysm. Patient was brought to the emergency room with shortness of breath and worsening leg edema. Patient was admitted for acute on chronic diastolic heart failure and pelvic abscess. Palliative care was consulted for goals of care, CODE STATUS discussion, family support, and symptom management. Patient's previous functional status is A&OX4 per nursing staff and patient. Patient lives at home but was recent at nursing home R/T IV ATBx and for therapy. Patient usually takes Tylenol for pain. Patient/health caregiver has had moderate functional decline over roughly the last six months with increase hospitalizations. I find patient working in physical therapy and they state that she is doing better functionally today and this visit than her last hospitalization. No complaints from the patient during my assessment. I will call the patient's son to further discuss her care and answer any of his questions. I called the patient's son Mira and he claims he is the patient's POA.   I did ask if they needed any additional information or questions about her current condition. He declined and said he did not have any questions per our telephone conversation. He said he is familiar with the palliative care program and he does not wish to sign-on at this time and he will make a decision after discharge. Patient's son claims that they have the healthcare power of  and living will paperwork but he is unable to bring them in. The patient and patient's POA (son) would like the patient to remain a full code at this time. Most recent notable labs: Chloride 94, random glucose 113, CRP 39.2, pro-BNP 16,178, albumin 3.0, ALT 44, AST 49      Review of Systems:       Review of Systems   Constitutional:  Positive for activity change and fatigue. Negative for appetite change, diaphoresis and fever. HENT: Negative. Eyes: Negative. Respiratory:  Positive for shortness of breath (Occasional). Negative for apnea, choking, wheezing and stridor. Cardiovascular: Negative. Gastrointestinal: Negative. Endocrine: Negative. Genitourinary: Negative. Musculoskeletal: Negative. Skin: Negative. Allergic/Immunologic: Negative. Neurological:  Positive for weakness. Negative for seizures, syncope, facial asymmetry and speech difficulty. Hematological: Negative. Psychiatric/Behavioral: Negative. Physical Examination:       /78   Pulse 76   Temp 98.1 °F (36.7 °C) (Oral)   Resp 18   Ht 4' 11\" (1.499 m)   Wt 149 lb 4.8 oz (67.7 kg)   SpO2 92%   BMI 30.15 kg/m²    Physical Exam  Vitals and nursing note reviewed. Constitutional:       General: She is awake. She is not in acute distress. Appearance: Normal appearance. She is obese. She is not ill-appearing or toxic-appearing. HENT:      Head: Normocephalic and atraumatic. No right periorbital erythema or left periorbital erythema. Right Ear: Hearing and external ear normal. No laceration.       Left Ear: Hearing and external ear normal. No laceration. Nose: Nose normal. No nasal deformity, signs of injury, laceration or rhinorrhea. Mouth/Throat:      Lips: Pink. No lesions. Mouth: Mucous membranes are moist. No injury, lacerations or angioedema. Eyes:      General: Lids are normal. Vision grossly intact. Gaze aligned appropriately. No allergic shiner or scleral icterus. Extraocular Movements: Extraocular movements intact. Conjunctiva/sclera: Conjunctivae normal.      Pupils: Pupils are equal, round, and reactive to light. Cardiovascular:      Rate and Rhythm: Normal rate and regular rhythm. Pulses: Normal pulses. Heart sounds: Normal heart sounds. Pulmonary:      Effort: Pulmonary effort is normal. No respiratory distress. Breath sounds: Decreased air movement present. Decreased breath sounds present. No wheezing or rhonchi. Chest:      Comments: Increased A/P  Abdominal:      General: Bowel sounds are normal. There is no distension. Palpations: Abdomen is soft. Tenderness: There is abdominal tenderness (occasional when sitting up). There is no guarding or rebound. Musculoskeletal:         General: No swelling, deformity or signs of injury. Cervical back: Normal range of motion and neck supple. No edema, erythema or signs of trauma. No pain with movement. Right lower leg: No edema. Left lower leg: No edema. Skin:     General: Skin is warm. Coloration: Skin is pale. Skin is not jaundiced. Findings: Bruising present. No erythema. Neurological:      General: No focal deficit present. Mental Status: She is alert, oriented to person, place, and time and easily aroused. Mental status is at baseline. Cranial Nerves: No facial asymmetry. Motor: Weakness present.    Psychiatric:         Attention and Perception: Attention and perception normal.         Mood and Affect: Mood and affect normal.         Speech: Speech normal.         Behavior: Behavior is cooperative. Thought Content: Thought content normal.         Judgment: Judgment normal.      Comments: [place] speaking primarily       Allergies:       Allergies   Allergen Reactions    Latex     Tape Etha Coral Tape]        Medications:      Current Facility-Administered Medications   Medication Dose Route Frequency Provider Last Rate Last Admin    cyanocobalamin injection 1,000 mcg  1,000 mcg IntraMUSCular Weekly Deb Scullin, DO   1,000 mcg at 11/17/22 0739    coenzyme Q10 capsule 100 mg  100 mg Oral Daily Deb Scullin, DO   100 mg at 11/17/22 0738    lidocaine 4 % external patch 3 patch  3 patch TransDERmal Daily Deb Scullin, DO   3 patch at 11/17/22 3686    acetaminophen (TYLENOL) tablet 650 mg  650 mg Oral Q4H PRN Deb Scullin, DO        bisacodyl (DULCOLAX) suppository 10 mg  10 mg Rectal Daily PRN Deb Scullin, DO        sodium phosphate (FLEET) rectal enema 1 enema  1 enema Rectal Daily PRN Deb Scullin, DO        alteplase (CATHFLO) injection 1 mg  1 mg IntraCATHeter Once Deb Scullin, DO        sodium chloride flush 0.9 % injection 5-40 mL  5-40 mL IntraVENous 2 times per day Gabriela Keys MD   10 mL at 11/17/22 0755    sodium chloride flush 0.9 % injection 5-40 mL  5-40 mL IntraVENous PRN Gabriela Keys MD        ondansetron (ZOFRAN-ODT) disintegrating tablet 4 mg  4 mg Oral Q8H PRN Gabriela Keys MD        Or    ondansetron (ZOFRAN) injection 4 mg  4 mg IntraVENous Q6H PRN Gabriela Keys MD        polyethylene glycol (GLYCOLAX) packet 17 g  17 g Oral Daily PRN Gabriela Keys MD        digoxin (LANOXIN) tablet 125 mcg  125 mcg Oral Daily Gabriela Keys MD   125 mcg at 11/17/22 7255    Vitamin D (CHOLECALCIFEROL) tablet 2,000 Units  2,000 Units Oral Dinner Gabriela Keys MD        rivaroxaban Charlee Sandovaltler) tablet 15 mg  15 mg Oral Daily with breakfast Gabriela Keys MD   15 mg at 11/17/22 0739    levothyroxine (SYNTHROID) tablet 88 mcg  88 mcg Oral Daily Gabriela Keys MD   88 mcg at 11/17/22 0552    ferrous sulfate (IRON 325) tablet 325 mg  325 mg Oral BID  Cynthia Tyler MD   325 mg at 11/17/22 0739    carvedilol (COREG) tablet 6.25 mg  6.25 mg Oral BID Cynthia Tyler MD   6.25 mg at 11/17/22 0739    citalopram (CELEXA) tablet 20 mg  20 mg Oral Daily Cynthia Tyler MD   20 mg at 11/17/22 0739    pantoprazole (PROTONIX) tablet 40 mg  40 mg Oral Daily Cynthia Tyler MD   40 mg at 11/17/22 0739    lactobacillus acidophilus [facility]) 4 tablet  4 tablet Oral TID Cynthia Tyler MD   4 tablet at 11/17/22 0739    potassium chloride (KLOR-CON M) extended release tablet 20 mEq  20 mEq Oral BID Cynthia Tyler MD   20 mEq at 11/17/22 0738    sacubitril-valsartan (ENTRESTO) 24-26 MG per tablet 1 tablet  1 tablet Oral BID Cynthia Tyler MD   1 tablet at 11/17/22 0739    furosemide (LASIX) tablet 40 mg  40 mg Oral Daily Cynthia Tyler MD   40 mg at 11/17/22 0739    sucralfate (CARAFATE) tablet 1 g  1 g Oral 4 times per day Cynthia Tyler MD   1 g at 11/17/22 1104    piperacillin-tazobactam (ZOSYN) 3,375 mg in dextrose 5 % 50 mL IVPB (mini-bag)  3,375 mg IntraVENous Q8H Cynthia Tyler MD 12.5 mL/hr at 11/17/22 0748 3,375 mg at 11/17/22 0748       History:       PMHx:  Past Medical History:   Diagnosis Date    Ascending aortic aneurysm 6/23/2017    Charcot Arleen Tooth muscular atrophy     CHF (congestive heart failure) (HCC)     Chronic diastolic CHF (congestive heart failure) ([facility] 4/1/2022    Depression     Descending aortic aneurysm ([facility] [address] 6/23/2017    Essential hypertension 2/16/2018    Headache     HTN (hypertension)     Impaired mobility and activities of daily living     Lumbar stenosis with neurogenic claudication     Myelopathy [facility])     Osteoarthritis        PSHx:  Past Surgical History:   Procedure Laterality Date    IR INS PICC VAD W SQ PORT GREATER THAN 5  9/23/2022    IR INS PICC VAD W SQ PORT GREATER THAN 5 9/23/2022 [facility] SPECIAL PROCEDURE    JOINT REPLACEMENT Bilateral     knees    OTHER SURGICAL HISTORY Right 07/21/2022    cat scan guided abdominal mass aspiration with drain placement by Dr. Fatimah Valenzuela Left 02/25/2021    LEFT PLEURAL CATHETER INSERTION performed by Jc Ryoal MD at [place] [id number] Left 01/29/2021    total of 755 cc removed per Dr Octavio Kussmaul specimen sent to lab       Social Hx:  Social History     Socioeconomic History    Marital status:       Spouse name: None    Number of children: 2    Years of education: None    Highest education level: None   Tobacco Use    Smoking status: Never    Smokeless tobacco: Never   Vaping Use    Vaping Use: Never used   Substance and Sexual Activity    Alcohol use: No     Alcohol/week: 0.0 standard drinks    Drug use: No    Sexual activity: Not Currently   Social History Narrative    , Lives With: Alone, son lives down the street, dtr is in the area    Type of Home: [address]: One level    Home Access: Stairs to enter with rails- Number of Steps: 2- Rails: Both    Bathroom Shower/Tub: Tub/Shower unit, Bathroom Equipment: Grab bars in shower, Shower chair    Home Equipment: Rolling walker, Cane(Pt infrequemtly uses DME for ambulation and prefers to furniture walk in home)    ADL Assistance: Independent, [address]: [address] [facility] Responsibilities: Yes    Ambulation Assistance: Independent, Transfer Assistance: Independent    Additional Comments: Son stops over 2 times daily           Family Hx:  Family History   Problem Relation Age of Onset    Arthritis Mother     Arthritis Father     High Blood Pressure Father        LABS: Reviewed     CBC:  Lab Results   Component Value Date/Time    WBC 7.6 11/17/2022 10:34 AM    RBC 4.54 11/17/2022 10:34 AM    HGB 12.3 11/17/2022 10:34 AM    HCT 37.5 11/17/2022 10:34 AM    MCV 82.7 11/17/2022 10:34 AM    MCH 27.1 11/17/2022 10:34 AM    MCHC 32.8 11/17/2022 10:34 AM    RDW 17.7 11/17/2022 10:34 AM     11/17/2022 10:34 AM     CBC with Differential:   Lab Results   Component Value Date/Time    WBC 7.6 11/17/2022 10:34 AM    RBC 4.54 11/17/2022 10:34 AM    HGB 12.3 11/17/2022 10:34 AM    HCT 37.5 11/17/2022 10:34 AM     11/17/2022 10:34 AM    MCV 82.7 11/17/2022 10:34 AM    MCH 27.1 11/17/2022 10:34 AM    MCHC 32.8 11/17/2022 10:34 AM    RDW 17.7 11/17/2022 10:34 AM    BANDSPCT 2 07/24/2022 05:00 AM    METASPCT 2 07/24/2022 05:00 AM    LYMPHOPCT 13.8 11/17/2022 10:34 AM    MONOPCT 10.1 11/17/2022 10:34 AM    BASOPCT 0.7 11/17/2022 10:34 AM    MONOSABS 0.8 11/17/2022 10:34 AM    LYMPHSABS 1.0 11/17/2022 10:34 AM    EOSABS 0.3 11/17/2022 10:34 AM    BASOSABS 0.1 11/17/2022 10:34 AM     CMP:    Lab Results   Component Value Date/Time     11/17/2022 06:27 AM    K 3.9 11/17/2022 06:27 AM    K 3.2 07/24/2022 05:00 AM     11/17/2022 06:27 AM    CO2 29 11/17/2022 06:27 AM    BUN 11 11/17/2022 06:27 AM    CREATININE 0.54 11/17/2022 06:27 AM    GFRAA >60.0 10/11/2022 10:30 AM    LABGLOM >60.0 11/17/2022 06:27 AM    GLUCOSE 113 11/17/2022 06:27 AM    PROT 6.4 11/11/2022 12:30 PM    LABALBU 3.0 11/11/2022 12:30 PM    CALCIUM 8.9 11/17/2022 06:27 AM    BILITOT 0.5 11/11/2022 12:30 PM    ALKPHOS 61 11/11/2022 12:30 PM    AST 49 11/11/2022 12:30 PM    ALT 44 11/11/2022 12:30 PM     BMP:    Lab Results   Component Value Date/Time     11/17/2022 06:27 AM    K 3.9 11/17/2022 06:27 AM    K 3.2 07/24/2022 05:00 AM     11/17/2022 06:27 AM    CO2 29 11/17/2022 06:27 AM    BUN 11 11/17/2022 06:27 AM    LABALBU 3.0 11/11/2022 12:30 PM    CREATININE 0.54 11/17/2022 06:27 AM    CALCIUM 8.9 11/17/2022 06:27 AM    GFRAA >60.0 10/11/2022 10:30 AM    LABGLOM >60.0 11/17/2022 06:27 AM    GLUCOSE 113 11/17/2022 06:27 AM     TSH:   Lab Results   Component Value Date/Time    TSH 2.690 03/17/2022 01:56 PM     Urinalysis:   Lab Results   Component Value Date/Time    NITRU Negative 11/11/2022 02:15 PM    WBCUA 0-2 08/05/2022 07:45 AM    BACTERIA Negative 08/05/2022 07:45 AM    RBCUA 6-10 08/05/2022 07:45 AM    BLOODU Negative 11/11/2022 02:15 PM    SPECGRAV 1.006 11/11/2022 02:15 PM    GLUCOSEU Negative 11/11/2022 02:15 PM     Albumin:   Lab Results   Component Value Date    LABALBU 3.0 (L) 11/11/2022     Liver:  Lab Results   Component Value Date    ALT 44 (H) 11/11/2022    AST 49 (H) 11/11/2022    ALKPHOS 61 11/11/2022    BILITOT 0.5 11/11/2022     Cardiac (most recent):  C-reactive protein (most recent 11/17/2022): 39.2  Pro-BNP (most recent 11/11/2022): 16,178    Weight Trends  Wt Readings from Last 10 Encounters:   11/17/22 149 lb 4.8 oz (67.7 kg)   11/16/22 150 lb 4.8 oz (68.2 kg)   11/02/22 160 lb (72.6 kg)   10/24/22 155 lb (70.3 kg)   10/03/22 155 lb (70.3 kg)   09/19/22 155 lb (70.3 kg)   09/14/22 155 lb (70.3 kg)   09/02/22 159 lb (72.1 kg)   08/19/22 159 lb 3.2 oz (72.2 kg)   08/12/22 159 lb 3.2 oz (72.2 kg)          FUNCTIONAL ADL´S:     Independent: [ X ] Eating, [   ] Dressing, [   ] Transferring, [ x ] Toileting, [   ] Deborah Vale, [ x ] Continence  Dependent   : [  ] Eating, [   ] Dressing, [   ] Transferring, [   ] Franky Ramos, [   ] Deborah Collazo, [   ] Continence  W. assistant : [  ] Eating, [ x ] Dressing, [ x ] Transferring, [   ] Franky Ramos, [ x ] Bathing, [   ] Continence    Radiology: Reviewed      CT Result (most recent):  CTA CHEST W WO CONTRAST 11/11/2022    Narrative  EXAMINATION:  CTA OF THE CHEST WITH AND WITHOUT CONTRAST 11/11/2022 1:53 pm    TECHNIQUE:  CTA of the chest was performed before and after the administration of  intravenous contrast.  Multiplanar reformatted images are provided for  review. MIP images are provided for review. Automated exposure control,  iterative reconstruction, and/or weight based adjustment of the mA/kV was  utilized to reduce the radiation dose to as low as reasonably achievable.     COMPARISON:  CT chest 06/22/2022    HISTORY:  ORDERING SYSTEM PROVIDED HISTORY: PE  TECHNOLOGIST PROVIDED HISTORY:  Reason for exam:->PE  Decision Support Exception - unselect if not a suspected or confirmed  emergency medical condition->Emergency Medical Condition (MA)  What reading provider will be dictating this exam?->CRC    FINDINGS:  Pulmonary Arteries: Pulmonary arteries are adequately opacified for  evaluation. No evidence of intraluminal filling defect to suggest pulmonary  embolism. Main pulmonary artery is normal in caliber. Mediastinum: There is severe cardiomegaly with particular enlargement of the  right ventricle and atrium. Reflux of injected contrast is seen in the IVC  and dilated hepatic veins suggestive of significant right heart dysfunction. No pericardial effusion is noted. Moderate coronary artery calcification is  noted. Stable thoracic aortic aneurysm is seen. The ascending aorta measures  up to 5.4 cm, previously 5.4 cm. The descending aorta measures 4.6 cm,  previously 4.6 cm. Lungs/Pleura: Bilateral trace pleural effusions are seen. Smooth  interlobular septal thickening with ground-glass opacity is noted  dependently. Bilateral lower lobe subsegmental atelectasis is also seen. Lingula subsegmental atelectasis noted. Upper Abdomen: Perihepatic ascites is noted. Soft Tissues/Bones: No acute bone or soft tissue abnormality. Impression  No evidence of pulmonary embolism. Severe cardiomegaly with evidence of congestive heart failure manifesting as  mild edema and bilateral trace pleural effusions as well as significant right  heart dysfunction including partially visualized perihepatic ascites. Xray Result (most recent):  XR CHEST PORTABLE 11/11/2022    Narrative  EXAMINATION:  ONE XRAY VIEW OF THE CHEST    11/11/2022 12:42 pm    COMPARISON:  None. HISTORY:  ORDERING SYSTEM PROVIDED HISTORY: sob  TECHNOLOGIST PROVIDED HISTORY:  Reason for exam:->sob  What reading provider will be dictating this exam?->CRC    FINDINGS:  The heart is enlarged.   Bilateral aspect airspace disease is noted which  could represent CHF or less likely pneumonia. There is no consolidation seen  within the right lung base. There is left lung base consolidation which is  favored to represent either atelectasis or a pleural effusion. Note is made of a right-sided PICC line. The catheter tip is at the level of  the clavicle head. Impression  Cardiomegaly with multifocal bilateral airspace disease favored to represent  CHF    Left lung base consolidation which could represent atelectasis, pleural  effusion or less likely pneumonia. .      Assessment and plan:  Palliative care encounter  Goals of care  Advanced care planning  -Continue with current plan of care-no changes.  -Palliative care will sign off at this time as the patient's son has requested to make a decision after her discharge. All questions were answered but please reconsult or reach out if questions arise. I will leave palliative care card/information at patient's bedside. I have discussed/reviewed the patient's case with nursing staff and case management.    -Advance Care Planning  Discussed goals of care with patient. Explained in extensive detail nuances between full code, DNR CCA and DNR CC. Patient has made the decision to be FULL CODE. -Goals of Care Discussion:  Disease process and goals of treatment were discussed in basic terms. Darryl's goal is to optimize available comfort care measures continue with current plan of care and aggressive treatment. We discussed the palliative care philosophy in light of those goals. We discussed all care options contingent on treatment response and QOL. Much active listening, presence, and emotional support were given.      -Patient is currently being treated for multiple medical conditions by other providers  Acute on chronic diastolic heart failure  History of pelvic abscess  History of hypothyroidism  Chronic respiratory failure on oxygen nasal cannula  Dyspnea  Atrial fibrillation  Ascending aortic aneurysm  Hypertension  Abdominal abscess  CAD  Elevated D-dimer    MDM: Moderate    Electronically signed by ADRIENNE Currie CNP on 11/17/2022 at 11:29 AM    Please note this report is partially produced by using speech recognition hardware. It may contain errors related to the system, including grammar, punctuation and spelling as well as words and phrases that may seem inaccurate. For any questions or concerns feel free to contact me for clarification. Thanks for the opportunity you have allowed us to provide palliative care to [address] [place] [address]. We will be in touch as care progresses. Please feel free to reach out to us should you have any questions or requests.

## 2022-11-17 NOTE — PROGRESS NOTES
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 Pt declined therapy d/t fatigue and pain. Provided Max encouragement, however, pt continued to decline stating \"tomorrow\". Pt requests to change clothes in preparation for sleep. Bed Mobility  Sit to Supine  Assistance Level: Supervision  Supine to Sit  Assistance Level: Supervision    Upper Extremity Dressing  Assistance Level: Supervision  Skilled Clinical Factors: To doff t-shirt  Lower Extremity Dressing  Assistance Level: Moderate assistance  Skilled Clinical Factors: To doff pants. Assistance to doff pants past feet    ASSESSMENT:  Activity Tolerance: Patient limited by fatigue;Patient limited by pain    PLAN OF CARE:  Balance training, Functional mobility training, Endurance training, Strengthening, Pain management, Safety education & training, Patient/Caregiver education & training, Equipment evaluation, education, & procurement, Home management training, Self-Care / ADL    Patient goals : \"To go home. \"  Time Frame for Long Term Goals :  Within 1 week pt will improve in the following areas in order to reach specific goals as outlined in initial evaluation  Long Term Goal 1: Pt will improve ADL independence  Long Term Goal 2: Pt. will improve balance and safety with transfers  Long Term Goal 3: Pt will improve UE strength and endurance for ADL and transfer tasks    Therapy Time:   Individual Group Co-Treat   Time In 1530       Time Out 1543         Minutes 13         ADL/IADL trainin minutes     Electronically signed by PRUDENCE Lopez on 2022 at 4:14 PM

## 2022-11-17 NOTE — PROGRESS NOTES
Shift assessment completed. Pt denies any pain. On 3L  oxygen per NC. Up to BR x2 this shift so far via W/C pivot transfer-tolerated well. Last BM this shift. PICC line intact with clean dressing-easy flush good blood return. Pt speaks [place] mostly-but able to make needs known. Call light with in reach and bed alarm activated.

## 2022-11-17 NOTE — H&P
HISTORY & PHYSICAL       DATE OF ADMISSION:  11/16/2022    DATE OF SERVICE:  11/17/22    Subjective:    Dominik Rodriguez, 80 y.o. 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 Other complicating issues include intra-abdominal abscess and is getting IV Invanz. Though her CHF has been getting better she continues to have nausea and poor p.o. intake from her abdominal issues. Apparently her drainage catheter came out about 2 to 4 weeks ago she was hospitalized and interventional radiology took her back to retrain it though the ultimate plan will be with a [facility] [facility] clinic for possible a larger drain. drain was accidentally pulled out a few weeks ago. Care has been complicated by Charcot-Arleen-Tooth neuropathy and generalized progression of her weakness. She requires of PT OT and nutritional and medical supervision to monitor her multiple medical comorbidities as well as achieve greater independence in her balance endurance righting reaction and nutritional deficits. She will require frequent medical monitoring regarding her anemia, infectious disease, anemia. Estimate length of stay is 7 to 10 days. Abdominal Pain  This is a recurrent problem. The current episode started 1 to 4 weeks ago. The pain is located in the epigastric region, right flank and RLQ. The pain is at a severity of 9/10. The quality of the pain is dull, aching and colicky. Associated symptoms include anorexia, arthralgias, constipation, a fever and myalgias. Pertinent negatives include no diarrhea, dysuria, frequency, headaches, hematuria, nausea or vomiting. The pain is aggravated by certain positions. The pain is relieved by Bowel movements and certain positions.  Prior diagnostic workup includes GI consult, CT scan, surgery and ultrasound. Fatigue  This is a recurrent problem. The current episode started 1 to 4 weeks ago. The problem has been gradually improving. Associated symptoms include abdominal pain, anorexia, arthralgias, fatigue, a fever, myalgias, numbness and weakness. Pertinent negatives include no chest pain, chills, congestion, coughing, diaphoresis, headaches, nausea, neck pain, rash, sore throat or vomiting. The symptoms are aggravated by walking. She has tried rest and heat for the symptoms. The treatment provided mild relief. I reviewed recent nursing note, \" Shift assessment completed. Pt denies any pain. On 3L  oxygen per NC. Up to BR x2 this shift so far via W/C pivot transfer-tolerated well. Last BM this shift. PICC line intact with clean dressing-easy flush good blood return. Pt speaks [place] mostly-but able to make needs known. Call light with in reach and bed alarm activated. \". The patient has stabilized medically and is able to participate at acute level rehab but is too medically complex for SNF due to need for therapy at the acute level with at least 15 hours a week of PT OT and cognitive and recreational therapy at an acute level with daily medical monitoring. Imaging:  Imaging and other studies reviewed and discussed with patient and staff    CTA CHEST 11/11/2022  EXAMINATION: CTA OF THE CHEST WITH AND WITHOUT CONTRAST 11/11/2022 1:53 pm TECHNIQUE: CTA of the chest was performed before and after the administration of intravenous contrast.  Multiplanar reformatted images are provided for review. MIP images are provided for review. Automated exposure control, iterative reconstruction, and/or weight based adjustment of the mA/kV was utilized to reduce the radiation dose to as low as reasonably achievable.  COMPARISON: CT chest 06/22/2022 HISTORY: ORDERING SYSTEM PROVIDED HISTORY: PE TECHNOLOGIST PROVIDED HISTORY: Reason for exam:->PE Decision Support Exception - unselect if not a suspected or confirmed emergency medical condition->Emergency Medical Condition (MA) What reading provider will be dictating this exam?->CRC FINDINGS: Pulmonary Arteries: Pulmonary arteries are adequately opacified for evaluation. No evidence of intraluminal filling defect to suggest pulmonary embolism. Main pulmonary artery is normal in caliber. Mediastinum: There is severe cardiomegaly with particular enlargement of the right ventricle and atrium. Reflux of injected contrast is seen in the IVC and dilated hepatic veins suggestive of significant right heart dysfunction. No pericardial effusion is noted. Moderate coronary artery calcification is noted. Stable thoracic aortic aneurysm is seen. The ascending aorta measures up to 5.4 cm, previously 5.4 cm. The descending aorta measures 4.6 cm, previously 4.6 cm. Lungs/Pleura: Bilateral trace pleural effusions are seen. Smooth interlobular septal thickening with ground-glass opacity is noted dependently. Bilateral lower lobe subsegmental atelectasis is also seen. Lingula subsegmental atelectasis noted. Upper Abdomen: Perihepatic ascites is noted. Soft Tissues/Bones: No acute bone or soft tissue abnormality. No evidence of pulmonary embolism. Severe cardiomegaly with evidence of congestive heart failure manifesting as mild edema and bilateral trace pleural effusions as well as significant right heart dysfunction including partially visualized perihepatic ascites. CT GUIDED NEEDLE PLACEMENT    1. Successful aspiration of left lower quadrant abdominal abscess. 2.Only a few drops of very thick gelatinous serosanguineous fluid with extensive solid debris was able to be aspirated. Due to this a drain was unable to be placed. Patient will be seen general surgery for evaluation and possible surgical intervention. HISTORY: Gibson Chatman is a Female of 80 years age. DIAGNOSIS:  K65.1 Intra-abdominal abscess (HCC) ICD10 COMPARISON: None available.  CT Dose-Length Product (estimate related to radiation exposure from this exam):  710.1  mGy*cm. PROCEDURE: Following the discussion of the procedure, alternatives, risks versus benefits, informed consent was obtained from the patient. Specifically, risks of after-biopsy pain at the site, rare possibility of excessive hemorrhage, infection, injury to the adjacent organs were discussed and the patient verbalized understanding. Pre-procedure evaluation confirmed that the patient was an appropriate candidate for conscious sedation. Adequate sedation was maintained during the entire procedure. Vital signs, pulse oximetry, and response to verbal commands were monitored and recorded by the nurse throughout the procedure and the recovery period. Medical information was entered in the medical record including the medications and dosages used. The patient returned to baseline neurologic and physiologic status prior to leaving the department. No immediate sedation related complications were noted. Medication for conscious sedation was administered via IV route. 45 minutes of conscious sedation was provided. Following universal protocol, patient and site verification was performed with a \"timeout\" prior to the procedure. The patient was placed on the CT table in supine  position and the right lateral abdomen area was prepped and draped in usual sterile fashion. Using the usual sterile conditions, lidocaine and CT guidance, the complex septated abscess in the right lower  quadrant of the abdomen was accessed using a 4 Scottish aspiration catheter. After confirmation of appropriate localization of the needle, aspiration was attempted, though the abscess was mostly solid with very thick gelatinous material with extensive solid debris and only a few drops were aspirated. The drainage catheter was removed and hemostasis was achieved by direct digital compression. The patient tolerated the procedure well. No immediate complications identified.   The patient left the CT suite in supine position to the recovery room in stable condition. Total local anesthetic used: lidocaine, approximately 8 mL. Estimated blood loss:  Negligible. The patient was observed in the recovery room for two hours after the procedure and discharged in stable condition. CT ABDOMEN PELVIS W IV CONTRAST Additional Contrast? Oral    1. The right-sided abscess drain has been completely pulled out of the abscess and now lies in the right abdominal subcutaneous soft tissues. 2.The right lower abdominal abscess has increased in size when compared to prior study dating September 14 likely due to accumulation of infectious fluid due to displacement of the drainage catheter. 3.Again seen is a large cystic mass in the lower pelvis. COMPARISON: September 14, 2022 DIAGNOSIS: Right lower abdomen abscess COMMENTS: None TECHNIQUE: Spiral scanning of the chest, abdomen and pelvis was performed after  administration of intravenous contrast. CT Dose-Length Product (estimate related to radiation exposure from this exam):  314.66  mGy*cm. FINDINGS: ABDOMEN The visualized portion of the lung bases demonstrates a small effusion in the left thoracic cavity. The liver is of normal size and attenuation without focal lesions. The spleen is unremarkable. Kidneys demonstrate prompt enhancement and excretion of contrast without signs of hydronephrosis or focal mass. The pancreas and adrenals are unremarkable. The upper abdominal bowel loops appear normal. Again seen is ectasia of the visualized abdominal aorta and tortuosity, unchanged from prior. 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 There are no signs of upper abdominal fluid collections. PELVIS  Again seen is a large abscess in the right lower abdomen/pelvis abutting the cecum now measuring 6.6 x 9.4 in transverse and AP dimensions, previously measuring 6 x 6.7 and AP and transverse dimensions.  The previously seen abscess drain has now been pulled out of the abscess and lies in the subcutaneous soft tissues and is not contributing to any drainage of the abscess consistent with abscess enlargement. Again seen is a large cystic mass in the pelvis. XR CHEST   11/11/2022  The heart is enlarged. Bilateral aspect airspace disease is noted which could represent CHF or less likely pneumonia. There is no consolidation seen within the right lung base. There is left lung base consolidation which is favored to represent either atelectasis or a pleural effusion. Note is made of a right-sided PICC line. The catheter tip is at the level of the clavicle head. Cardiomegaly with multifocal bilateral airspace disease favored to represent CHF Left lung base consolidation which could represent atelectasis, pleural effusion or less likely pneumonia. .            Labs:    Labs reviewed and discussed with patient and staff    Lab Results   Component Value Date/Time    POCGLU 115 11/16/2022 04:06 PM    POCGLU 142 11/11/2022 12:32 PM    POCGLU 95 08/11/2022 06:29 AM    POCGLU 113 02/06/2021 04:16 PM    POCGLU 134 02/06/2021 11:21 AM     Lab Results   Component Value Date/Time     11/17/2022 06:27 AM    K 4.1 11/17/2022 10:34 AM    K 3.9 11/17/2022 06:27 AM    CL 94 11/17/2022 10:34 AM    CO2 28 11/17/2022 10:34 AM    BUN 12 11/17/2022 10:34 AM    CREATININE 0.49 11/17/2022 10:34 AM    CALCIUM 8.8 11/17/2022 10:34 AM    LABALBU 3.0 11/11/2022 12:30 PM    BILITOT 0.5 11/11/2022 12:30 PM    ALKPHOS 61 11/11/2022 12:30 PM    AST 49 11/11/2022 12:30 PM    ALT 44 11/11/2022 12:30 PM     Lab Results   Component Value Date/Time    WBC 7.6 11/17/2022 10:34 AM    RBC 4.54 11/17/2022 10:34 AM    HGB 12.3 11/17/2022 10:34 AM    HCT 37.5 11/17/2022 10:34 AM    MCV 82.7 11/17/2022 10:34 AM    MCH 27.1 11/17/2022 10:34 AM    MCHC 32.8 11/17/2022 10:34 AM    RDW 17.7 11/17/2022 10:34 AM     11/17/2022 10:34 AM     Lab Results   Component Value Date/Time    VITD25 56.0 10/02/2020 11:59 AM     Lab Results   Component Value Date/Time    COLORU Yellow 11/11/2022 02:15 PM    NITRU Negative 11/11/2022 02:15 PM    GLUCOSEU Negative 11/11/2022 02:15 PM    KETUA Negative 11/11/2022 02:15 PM    UROBILINOGEN 0.2 11/11/2022 02:15 PM    BILIRUBINUR Negative 11/11/2022 02:15 PM     Lab Results   Component Value Date/Time    PROTIME 18.9 11/11/2022 12:30 PM     Lab Results   Component Value Date/Time    INR 1.6 11/11/2022 12:30 PM           The patient remains highly medically complex and continues to have severe problems with activities of daily living and mobility. The patient was assessed to be able to tolerate intensive rehabilitation and therefore was admitted to Rehabilitation to address these needs. The patient has been found to have severe abnormality of gait and mobility with impaired self care and is admitted to the acute inpatient rehab program.       Prior Function; everyday activities:     Social History     Socioeconomic History    Marital status:       Spouse name: Not on file    Number of children: 2    Years of education: Not on file    Highest education level: Not on file   Occupational History    Not on file   Tobacco Use    Smoking status: Never    Smokeless tobacco: Never   Vaping Use    Vaping Use: Never used   Substance and Sexual Activity    Alcohol use: No     Alcohol/week: 0.0 standard drinks    Drug use: No    Sexual activity: Not Currently   Other Topics Concern    Not on file   Social History Narrative    , Lives With: Alone, son lives down the street, dtr is in the area    [facility] [address] [place] [address] Court: One level    Home Access: Stairs to enter with rails- Number of Steps: 2- Rails: Both    Bathroom Shower/Tub: Tub/Shower unit, Bathroom Equipment: Grab bars in shower, Shower chair    Home Equipment: Rolling walker, Cane(Pt infrequemtly uses DME for ambulation and prefers to furniture walk in home)    ADL Assistance: Independent, [address]: Independent    Homemaking Responsibilities: Yes    Ambulation Assistance: Independent, Transfer Assistance: Independent    Additional Comments: Son stops over 2 times daily         Social Determinants of Health     Financial Resource Strain: Not on file   Food Insecurity: Not on file   Transportation Needs: Not on file   Physical Activity: Not on file   Stress: Not on file   Social Connections: Not on file   Intimate Partner Violence: Not on file   Housing Stability: Not on file     Social supports listed above. Prior Device(s) used:  As above    History of falls:  Rarely falls    In depth analysis of complex functional data; the patient has been:    Current Rehabilitation Assessments:  Physical Therapy:   Bed mobility:  Bed mobility  Bridging: Independent (11/17/22 1018)  Rolling to Left: Stand by assistance (11/17/22 1018)  Rolling to Right: Stand by assistance (11/17/22 1018)  Supine to Sit: Stand by assistance (11/17/22 1018)  Sit to Supine: Stand by assistance (11/17/22 1018)  Scooting: Stand by assistance (11/17/22 1018)  Bed Mobility Comments: Pt requires increased time and effort with HOB flat and no rails - concern for edge of bed safety noted (11/17/22 1018)  Transfers:  Transfers  Sit to Stand: Stand by assistance (11/17/22 0948)  Stand to Sit: Stand by assistance (11/17/22 0948)  Bed to Chair: Stand by assistance (11/17/22 0948)  Comment: toilet transfers SBA - cues for safety with O2 line management and reaching for surface in too great a distance (11/17/22 0948)  Sit to Stand  Assistance Level: Stand by assist (11/17/22 1402)  Stand to Sit  Assistance Level: Stand by assist (11/17/22 1402)  Car Transfer  Assistance Level: Moderate assistance (11/17/22 1402)  Skilled Clinical Factors: Pt able to get into car, get own LE's in and out but needed moderate assist with sit to stand.  (11/17/22 1402)  Gait:   Ambulation  Surface: Level tile (11/17/22 1281)  Device: Rolling Walker (11/17/22 2154)  Other Apparatus: AFO; Left;Right;O2 (11/17/22 5967)  Assistance: Stand by assistance;Minimal assistance (11/17/22 0949)  Quality of Gait: Assist to manage O2 line due to poor awareness, flexed trunk, fair maneuvering of walker (11/17/22 0949)  Gait Deviations: Slow Raji;Decreased step length (11/17/22 0949)  Distance: 30 feet (11/17/22 0949)  Comments: Pt with 3 L O2 in place. SOB following 30 feet - O2 sat at 92% (11/17/22 0949)  Ambulation  Surface: Carpet (11/17/22 1403)  Device: Rolling walker (11/17/22 1403)  Distance: 40', 50' (11/17/22 1403)  Additional Factors: Right AFO; Left AFO; Increased time to complete (3L O2) (11/17/22 1403)  Assistance Level: Stand by assist (11/17/22 1403)  Gait Deviations: Slow raji (11/17/22 1403)  Stairs:  Stairs/Curb  Stairs?: Yes (11/17/22 0949)  Stairs  # Steps : 4 (11/17/22 0949)  Stairs Height: 6\" (11/17/22 0949)  Rails: Bilateral (11/17/22 0949)  Assistance: Moderate assistance;Minimal assistance (11/17/22 0949)  Comment: mild post LOB with descent requiring recovery assistance; mild SOB following with O2 sat at 92% following with 3L O2 in place (11/17/22 0949)  Stairs  Stair Height: 6'' (11/17/22 1403)  Device: Bilateral handrails (11/17/22 1403)  Number of Stairs: 4 (11/17/22 1403)  Additional Factors: Non-reciprocal going up;Non-reciprocal going down (11/17/22 1403)  Assistance Level: Stand by assist (11/17/22 1403)  W/C mobility:       Occupational Therapy:   Hand Dominance: Right  ADL  Feeding: Independent (11/17/22 1011)  Grooming: Setup (11/17/22 1011)  UE Bathing: Setup (11/17/22 1011)  LE Bathing: Supervision (11/17/22 1011)  UE Dressing: Minimal assistance (11/17/22 1011)  UE Dressing Skilled Clinical Factors: Declines bra, assist pulling shirt all the the way down in the back.  (11/17/22 1011)  LE Dressing: Dependent/Total (11/17/22 1011)  LE Dressing Skilled Clinical Factors: Assist with B socks, B Positive for abdominal pain, anorexia and constipation. Negative for blood in stool, diarrhea, nausea and vomiting. Endocrine: Positive for cold intolerance. Negative for polydipsia. Genitourinary:  Negative for dysuria, flank pain, frequency, hematuria and urgency. Musculoskeletal:  Positive for arthralgias, back pain, gait problem and myalgias. Negative for neck pain. Skin:  Positive for wound. Negative for rash. Allergic/Immunologic: Positive for immunocompromised state. Negative for environmental allergies. Neurological:  Positive for dizziness, weakness and numbness. Negative for tremors, seizures and headaches. Hematological:  Does not bruise/bleed easily. Psychiatric/Behavioral:  Positive for decreased concentration and dysphoric mood. Negative for hallucinations and suicidal ideas. The patient is not nervous/anxious. Objective  /78   Pulse 76   Temp 98.1 °F (36.7 °C) (Oral)   Resp 18   Ht 4' 11\" (1.499 m)   Wt 149 lb 4.8 oz (67.7 kg)   SpO2 92%   BMI 30.15 kg/m² *    Physical Exam  Constitutional:       General: She is not in acute distress. Appearance: She is well-developed. She is not toxic-appearing or diaphoretic. HENT:      Head: Normocephalic. Not macrocephalic and not microcephalic. No raccoon eyes or Hurtado's sign. Mouth/Throat:      Pharynx: Oropharyngeal exudate present. Eyes:      General: No scleral icterus. Right eye: No discharge. Left eye: No discharge. Conjunctiva/sclera: Conjunctivae normal.      Pupils: Pupils are equal, round, and reactive to light. Neck:      Thyroid: No thyromegaly. Vascular: Normal carotid pulses. No carotid bruit, hepatojugular reflux or JVD. Trachea: No tracheal deviation. Pulmonary:      Effort: Pulmonary effort is normal.      Breath sounds: No stridor. Decreased breath sounds present. Abdominal:      General: A surgical scar is present. Tenderness:  There is generalized abdominal tenderness. Musculoskeletal:      Cervical back: Normal range of motion. Tenderness present. Spinous process tenderness and muscular tenderness present. Thoracic back: Tenderness present. Decreased range of motion. Lumbar back: Tenderness present. Decreased range of motion. Skin:     General: Skin is warm and dry. Coloration: Skin is pale. Findings: Bruising present. Comments: Old IV sites   Neurological:      Sensory: Sensory deficit present. Coordination: Coordination abnormal.      Gait: Gait abnormal.      Deep Tendon Reflexes:      Reflex Scores:       Tricep reflexes are 1+ on the right side and 1+ on the left side. Bicep reflexes are 1+ on the right side and 1+ on the left side. Brachioradialis reflexes are 1+ on the right side and 1+ on the left side. Patellar reflexes are 0 on the right side and 0 on the left side. Achilles reflexes are 0 on the right side and 0 on the left side. Psychiatric:         Attention and Perception: She is attentive. Mood and Affect: Mood is not anxious or depressed. Affect is not angry. Speech: Speech is not rapid and pressured. Behavior: Behavior is slowed. Behavior is not withdrawn. Thought Content: Thought content normal.         Cognition and Memory: Memory is not impaired. She exhibits impaired recent memory. She does not exhibit impaired remote memory. Judgment: Judgment is not impulsive or inappropriate. Back Exam     Muscle Strength   Right Quadriceps:  4/5   Left Quadriceps:  4/5   Right Hamstrings:  4/5   Left Hamstrings:  4/5         Neurologic Exam     Mental Status   Level of consciousness: alert  Knowledge: good. Cranial Nerves     CN III, IV, VI   Pupils are equal, round, and reactive to light.     Motor Exam   Muscle bulk: decreased  Overall muscle tone: normal    Strength   Right neck flexion: 4/5  Left neck flexion: 4/5  Right neck extension: 4/5  Left neck extension: 4/5  Right deltoid: 4/5  Left deltoid: 4/5  Right biceps: 4/5  Left biceps: 4/5  Right triceps: 4/5  Left triceps: 4/5  Right wrist flexion: 4/5  Left wrist flexion: 4/5  Right wrist extension: 4/5  Left wrist extension: 4/5  Right interossei: 4/5  Left interossei: 4/5  Right abdominals: 4/5  Left abdominals: 4/5  Right iliopsoas: 4/5  Left iliopsoas: 4/5  Right quadriceps: 4/5  Left quadriceps: 4/5  Right hamstrin/5  Left hamstrin/5  Right glutei: 4/5  Left glutei:   Right anterior tibial: 1  Left anterior tibial:   Right posterior tibial:   Left posterior tibial:   Right peroneal:   Left peroneal:   Right gastroc:   Left gastroc:     Gait, Coordination, and Reflexes     Reflexes   Right brachioradialis: 1+  Left brachioradialis: 1+  Right biceps: 1+  Left biceps: 1+  Right triceps: 1+  Left triceps: 1+  Right patellar: 0  Left patellar: 0  Right achilles: 0  Left achilles: 0    After extensive review of the records and above physical exam, I have formulated the following diagnoses and plan:      DIAGNOSES:    1. The patient was admitted to the acute rehabilitation unit with the primary rehab diagnosis being severe abnormality of gait and mobility and impaired self care and ADL's due to  CMT neuropathy. Compared to Pre-Admission Assessment, patients medical and functional status remain challengingly complex and patient continues to requireintensive therapeutic intervention from multiple therapies, therefore, initiate acute intensive comprehensive inpatient rehabilitation program including PT/OT to improve balance, ambulation, ADLs, and to improve the P/AROM.   Functional and medical status reassessed regarding patients ability to participate in therapies and patient found to be able to participate in acute intensive comprehensive inpatient rehabilitation program.  Therapeutic modifications regarding activities in therapies, place, amount of time per day and intensity of therapy made daily. Enroll in acute course of therapy program to include 1 1/2 hours per day of PT 5 to 7days per week and 1 1/2 hours per day of OT 5 to 7 days per week,   SLP and Rec T 1/2 hour per day 3-5 days per week. The patient is stable medically and physically on previous exam.  If deemed necessary therapy will be spread out over a 7-day window. This patient presented with significant new onset decreased mobility and inability to perform activities of daily living skills independently and is at significant risk for prolonged disability  For this reason they have been admitted to [address] [place] [address]. The patient's current functional and medical status are highly complex but the patient is able to participate in intensive rehabilitation. A comprehensive inpatient rehabilitation program is appropriate. The patient will undergo initial evaluation by the rehabilitation team and be discussed at regular treatment team meetings to assess progress, mobility, self care, mood and discharge issues. Physical therapy will be consulted for mobility and endurance issues and should be performed 1 to 2 times per day, 7 days per week for the length of stay. Occupational therapy will be consulted for activities of daily living and should be performed 1 to 2 times per day, 7 days per week for the length of stay. Their capacity to participate at an acute level, decision to be treated in the gym, room or on the unit, their activity goals for the day and the number of minutes of active participation will be reassessed and re-prescribed daily. Because this patient is medically complex, I will check a CBC, BMP, UA and orthostatic blood pressures.   They will be reassessed daily regarding their ability to participate in an acute level rehabilitation program.  Recreational Therapy will be consulted for community re-entry and adjustment to disability. Communication, cognitive and emotional issues will also be addressed during this rehabilitation stay by rehabilitation psychologist or speech therapist as appropriate. I reviewed the patient's old and current charts and discussed other rehabilitation options with the rehabilitation team including the rehab RN and the admission team as well as the patient. I feel that the patient's functional recovery would be best served at an acute inpatient rehabilitation program because the patient needs intense therapy three hours per day, direct RN supervision and daily monitoring by a physician for medical status. This cannot be sufficiently provided by home health care, a skilled nursing facility or in an outpatient setting. I further feel that the patient has the potential to improve functional abilities in an acute intensive rehabilitation program.    Old records were reviewed and summarized. 2.  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 Continue blood signs every shift focusing on heart rate, rhythm and blood pressure checks with orthostatic checks-monitoring the effect of exercise, therapy and posture. Consult hospitalist for backup medical and adjust/add medications ( Entresto, Xarelto, Lasix, Lanoxin, Coreg). Monitor heart rate and blood pressure as well as medications effects on vital signs before during and after therapy with especial focus on preventing orthostasis and falls risk.     Lumbar stenosis with neurogenic claudication    Hypokalemia-check BMP and supplement as needed,      Right lower quadrant abdominal pain dt   Abdominal mass  Hx of drainage of abscess,  Streptococcus viridans infection-IV antibiotics patient and family have been trained. Reconsult infectious disease     Anemia-Monitor vital signs monitor for orthostasis and tachycardia, check H&H prn. Vitamin B12 and iron-dose iron with food to prevent GI upset. Monitor for constipation. Monitor stools for blood. Palliative care encounter-for pain as needed    Depression-emotional support provided daily, vitamin B12, encourage participation in rehabilitation support group and recreational therapy, adjust/add medications ( Celexa )  GERD-Elevate head of bed after meals, monitor stools for blood, lowest effective dose of PPI, consider Tums. I am especially concerned about their recent medical complexities. The patient's high risk medication use includes  Xaralto. The patient is high risk for urinary tract infection, an admission urinalysis has been ordered. I will have the nurses check post void residual bladder volumes and place acatheter if excessive urine is retained in the bladder after voiding. The patient is risk for deep venous thromosis, complex deep venous thrombosis protocol prophylaxis has been ordered Xaralto. The patient is high risk for orthostasis and a hydration program and orthostatic blood pressure screening have been ordered. I will attempt to get old records from the patient's previous hospital stay. Care everywhere on [employer] was utilized. 3.  Current and previous medications were reviewed and summarized and compared to old medication lists from home and from the acute floor. 4.  Complex labs and x-rays were reviewed. I will review patient's old EKG and labs. 5.  Will provide emotional support for this patient regarding adjustment to their disability.   Cognition and mood will be screened daily and addressed by rehabilitation psychology and/or speech therapy as appropriate. I have encouraged the patient to attend the Rehab Adjustment to Disability Support Group and recreational therapy. 6.  Estimated length of stay is  1 - 1 1/2weeks. Discharge to home with help from family and home health PT, OT, RN, and aide. Patient should be independent at discharge. 7.  The patient's medical and rehab prognosis are good. [address] regarding the patient's back up to general medical needs. A welcome letter was presented with an explanation of my services, my specialty and what to expect during the rehabilitation process. As well as introducing myself, I also wrote my name on their bedside marker board with their name as well as the names of the other physicians with an explanation of our individual roles in their care, as well as the rehab process.             Darryl Bustillos D.O., F.A.A.P.LANDEN.&CARINA

## 2022-11-17 NOTE — CONSULTS
Consult Note            Date:11/17/2022        Patient Queta Reid     YOB: 1932     Age:89 y.o. Reason for Consult:      Chief Complaint   Shortness of breath         History Obtained From   patient    History of Present Illness   80 y.o with significant pmh of chronic hypoxemic respiratory failure on 2 lnc, chronic diastolic congestive heart failure, htn, cad with moderate LAD occlusion, ascending aortic aneurysm 5.3 cm (no plan for surgery with atrial fibrillation and mural thrombus on xarelto), myelopathy  admitted to [facility] [place] [facility] for acute intraabdominal infection and acute on chronic diastolic congestive heart failure. She was treated with iv zosyn for abscess, ct guided aspiration was performed. She had  picc line placed for long term antibiotics  She was off of abx for approx 2 weeks prior to starting the invanz, which started on 9/14/2022 x 30 days course. Dr Alissa Figueroa changed the abx on 10/28/22 to Zosyn x 6 weeks with stop date of December 5 th. She denies shortness of breath today. Denies chest pain. Attempted to use [employer]  for interpretation. Information obtained in his HPI was by electronic medical record and per patient.   Past Medical History     Past Medical History:   Diagnosis Date    Ascending aortic aneurysm 6/23/2017    Charcot Arleen Tooth muscular atrophy     CHF (congestive heart failure) ([facility])     Chronic diastolic CHF (congestive heart failure) ([facility] [address] 4/1/2022    Depression     Descending aortic aneurysm ([facility] [address] 6/23/2017    Essential hypertension 2/16/2018    Headache     HTN (hypertension)     Impaired mobility and activities of daily living     Lumbar stenosis with neurogenic claudication     Myelopathy [facility])     Osteoarthritis         Past Surgical History     Past Surgical History:   Procedure Laterality Date    IR INS PICC VAD W SQ PORT GREATER THAN 5  9/23/2022    IR INS PICC VAD W SQ PORT GREATER THAN 5 9/23/2022 [facility] SPECIAL PROCEDURE JOINT REPLACEMENT Bilateral     knees    OTHER SURGICAL HISTORY Right 07/21/2022    cat scan guided abdominal mass aspiration with drain placement by Dr. Radames Cox Left 02/25/2021    LEFT PLEURAL CATHETER INSERTION performed by Geronimo Jones MD at [place] [address] Left 01/29/2021    total of 755 cc removed per Dr Pablo Push specimen sent to lab        Medications     Prior to Admission medications    Medication Sig Start Date End Date Taking?  Authorizing Provider   Lactobacillus TABS Take by mouth daily    Historical Provider, MD   piperacillin-tazobactam (ZOSYN) 4-0.5 GM per 100ML IVPB Infuse 13.5 mg intravenously daily Pt  get 13.5 grams  over 24 hours  via easy pump    Historical Provider, MD   Lactobacillus Acidophilus POWD by Does not apply route    Historical Provider, MD   rivaroxaban (XARELTO) 15 MG TABS tablet Take 1 tablet by mouth daily 9/29/22   Orvil Simpler Ralph, APRN - CNP   meclizine (ANTIVERT) 25 MG tablet Take by mouth daily 9/12/22   Historical Provider, MD   acetaminophen (TYLENOL) 325 MG tablet Take 650 mg by mouth every 6 hours as needed for Pain    Historical Provider, MD   Vitamin D (CHOLECALCIFEROL) 50 MCG (2000 UT) TABS tablet Take 1 tablet by mouth Daily with supper  Patient taking differently: Take 2,000 Units by mouth Daily with supper Indications: Nutritional Support 8/18/22   Deb Oseguera, DO   potassium chloride (KLOR-CON M) 20 MEQ extended release tablet Take 20 mEq by mouth daily (with breakfast) Indications: Nutritional Support    Historical Provider, MD   furosemide (LASIX) 20 MG tablet TAKE 1 TABLET DAILY  Patient taking differently: Take 20 mg by mouth daily Indications: Congestive Heart Failure 4/4/22   Orvil Simpler Repko, APRN - CNP   digoxin (LANOXIN) 125 MCG tablet TAKE 1 TABLET DAILY 4/4/22 8/17/22  Orvil Simpler Ralph, APRN - CNP   amLODIPine (NORVASC) 5 MG tablet Take 1 tablet by mouth daily 4/1/22 8/17/22  Jorge Franco, DO   pantoprazole (PROTONIX) 40 MG tablet TAKE 1 TABLET DAILY  Patient taking differently: Take 40 mg by mouth daily Indications: Ulcer 2/11/22   ADRIENNE Caruso - CNP   carvedilol (COREG) 6.25 MG tablet TAKE 1 TABLET TWICE A DAY  Patient taking differently: Take 6.25 mg by mouth 2 times daily Indications: High Blood Pressure Disorder 1/17/22   ADRIENNE Caruso - CNP   nitroGLYCERIN (NITROSTAT) 0.4 MG SL tablet up to max of 3 total doses. If no relief after 1 dose, call 911. Patient taking differently: Place 0.4 mg under the tongue every 5 minutes as needed Indications: Chest Pain up to max of 3 total doses.  If no relief after 1 dose, call 911. 3/1/21   ADRIENNE Muir NP   ferrous sulfate (IRON 325) 325 (65 Fe) MG tablet Take 1 tablet by mouth 2 times daily (with meals)  Patient taking differently: Take 325 mg by mouth 2 times daily (with meals) Indications: Anemia 3/1/21   ADRIENNE Muir NP   Carboxymethylcellulose Sodium (EYE DROPS OP) Apply 1 drop to eye as needed (Dry eyes) Indications: Systane     Historical Provider, MD   Boswellia-Glucosamine-Vit D (OSTEO BI-FLEX ONE PER DAY) TABS Take 1 tablet by mouth daily Indications: Nutritional Support    Historical Provider, MD   Multiple Vitamins-Minerals (CENTRUM SILVER ULTRA WOMENS) TABS Take 1 tablet by mouth daily Indications: Nutritional Support    Historical Provider, MD   vitamin B-12 (CYANOCOBALAMIN) 1000 MCG tablet Take 1,000 mcg by mouth daily Indications: Nutritional Support    Historical Provider, MD   citalopram (CELEXA) 20 MG tablet Take 20 mg by mouth daily Indications: Depression 4/1/16   Historical Provider, MD   SYNTHROID 88 MCG tablet Take 88 mcg by mouth daily Indications: Underactive Thyroid 3/19/16   Historical Provider, MD        cyanocobalamin injection 1,000 mcg, Weekly  coenzyme Q10 capsule 100 mg, Daily  lidocaine 4 % external patch 3 patch, Daily  acetaminophen (TYLENOL) tablet 650 mg, Q4H PRN  bisacodyl (DULCOLAX) suppository 10 mg, Daily PRN  sodium phosphate (FLEET) rectal enema 1 enema, Daily PRN  alteplase (CATHFLO) injection 1 mg, Once  sodium chloride flush 0.9 % injection 5-40 mL, 2 times per day  sodium chloride flush 0.9 % injection 5-40 mL, PRN  ondansetron (ZOFRAN-ODT) disintegrating tablet 4 mg, Q8H PRN   Or  ondansetron (ZOFRAN) injection 4 mg, Q6H PRN  polyethylene glycol (GLYCOLAX) packet 17 g, Daily PRN  digoxin (LANOXIN) tablet 125 mcg, Daily  Vitamin D (CHOLECALCIFEROL) tablet 2,000 Units, Dinner  rivaroxaban (XARELTO) tablet 15 mg, Daily with breakfast  levothyroxine (SYNTHROID) tablet 88 mcg, Daily  ferrous sulfate (IRON 325) tablet 325 mg, BID WC  carvedilol (COREG) tablet 6.25 mg, BID  citalopram (CELEXA) tablet 20 mg, Daily  pantoprazole (PROTONIX) tablet 40 mg, Daily  lactobacillus acidophilus (FLORANEX) 4 tablet, TID  potassium chloride (KLOR-CON M) extended release tablet 20 mEq, BID  sacubitril-valsartan (ENTRESTO) 24-26 MG per tablet 1 tablet, BID  furosemide (LASIX) tablet 40 mg, Daily  sucralfate (CARAFATE) tablet 1 g, 4 times per day  piperacillin-tazobactam (ZOSYN) 3,375 mg in dextrose 5 % 50 mL IVPB (mini-bag), Q8H        Allergies   Latex and Tape [adhesive tape]    Social History     Social History       Tobacco History       Smoking Status  Never      Smokeless Tobacco Use  Never              Alcohol History       Alcohol Use Status  No              Drug Use       Drug Use Status  No              Sexual Activity       Sexually Active  Not Currently                    Family History     Family History   Problem Relation Age of Onset    Arthritis Mother     Arthritis Father     High Blood Pressure Father        Review of Systems   12 point ros reviewed with patient with pertinent positive listed in hpi otherwise ros negative    Physical Exam   /78   Pulse 76   Temp 98.1 °F (36.7 °C) (Oral)   Resp 18   Ht 4' 11\" (1.499 m)   Wt 149 lb 4.8 oz (67.7 kg)   SpO2 92%   BMI 30.15 kg/m²     CONSTITUTIONAL: awake, alert, cooperative, no apparent distress, and appears stated age  EYES:  pupils equal, round and reactive to light  ENT:  normocepalic, without obvious abnormality  NECK:  supple, symmetrical, trachea midline  LUNGS:  No increased work of breathing, good air exchange, clear to auscultation bilaterally, no crackles or wheezing  CARDIOVASCULAR:  normal S1 and S2  ABDOMEN:  normal bowel sounds and no distention bsp x 4  MUSCULOSKELETAL:  There is no redness, warmth, or swelling of the joints. Full range of motion noted. Motor strength is 5 out of 5 all extremities bilaterally. Tone is normal.  NEUROLOGIC:  Awake, alert, oriented to name, place and time. Cranial nerves II-XII are grossly intact. Motor is 5 out of 5 bilaterally. Cerebellar finger to nose, heel to shin intact. Sensory is intact. Babinski down going, Romberg negative, and gait is normal.  SKIN:  no redness, warmth, or swelling    Labs    CBC:No results for input(s): WBC, RBC, HGB, HCT, MCV, RDW, PLT in the last 72 hours. CHEMISTRIES:  Recent Labs     11/15/22  0542 11/16/22  0529 11/17/22  0627    139 139   K 3.9 3.3* 3.9   CL 95 98 100   CO2 26 32* 29   BUN 10 11 11   CREATININE 0.55 0.52 0.54   GLUCOSE 94 97 113*   MG 1.9 1.9 1.8     PT/INR:No results for input(s): PROTIME, INR in the last 72 hours. APTT:No results for input(s): APTT in the last 72 hours. LIVER PROFILE:No results for input(s): AST, ALT, BILIDIR, BILITOT, ALKPHOS in the last 72 hours. Imaging/Diagnostics   CTA CHEST W WO CONTRAST PE Eval    Result Date: 11/11/2022  No evidence of pulmonary embolism. Severe cardiomegaly with evidence of congestive heart failure manifesting as mild edema and bilateral trace pleural effusions as well as significant right heart dysfunction including partially visualized perihepatic ascites.      XR CHEST PORTABLE    Result Date: 11/11/2022  Cardiomegaly with multifocal bilateral airspace disease favored to represent CHF Left lung base consolidation which could represent atelectasis, pleural effusion or less likely pneumonia. .       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 11/17/2022 Yes    A-fib ([address] 11/17/2022 Yes    Lumbar stenosis with neurogenic claudication 11/17/2022 Yes    Right lower quadrant abdominal pain 11/17/2022 Yes    Hypokalemia 11/17/2022 Yes    Anemia 11/17/2022 Yes    CHF (congestive heart failure) ([address] 11/17/2022 Yes    Abdominal mass 11/17/2022 Yes    Hx of drainage of abscess 11/17/2022 Yes    Streptococcus viridans infection 11/17/2022 Yes    Aortic thrombus ([address] 11/17/2022 Yes    Impaired mobility and ADLs 11/17/2022 Yes    Depression 11/17/2022 Yes       Plan   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 Inga Monk. Intra-abdominal abscess-large and persistent-not a surgical candidate. Currently on IV Zosyn with end date of December 5, 2022. Patient will need to follow in infectious disease clinic that week. She will have a weekly CBC, BMP and CRP. Acute on chronic diastolic congestive heart failure-on p.o. Lasix, intake and output, daily weight    Acute on chronic hypoxemic respiratory failure-baseline home oxygen requirement of 2 L nasal cannula-oxygen being weaned    Atrial fibrillation-anticoagulated on Xarelto. Digoxin currently in sinus rhythm    Ascending aortic aneurysm 5.3 cm-no planned surgical intervention per daughter. She has mural thrombus and is on Xarelto. This is documented in cardiology's note    Coronary artery disease moderate LAD/hypertension-no active chest pain.   Patient is on Entresto, Lasix 40 mg oral daily and carvedilol 6.25 mg p.o. twice a day    Hypothyroidism-Synthroid    Epigastric pain-GI consulted during hospitalization prior to to admission to acute rehabilitation with the recommendation of PPI and Carafate. Differential gastritis peptic ulcer disease. May consider upper endoscopy diagnostic only if symptoms persist    Hospitalist have been consulted for medical management. I am managing acute needs. Patient will need to follow as outpatient for specialty needs and management of chronic diseases outpatient. Time spent 75 minutes    I personally obtained the key and critical portions of the history and physical exam and made additions where appropriate in the documentation.  I reviewed the mid level documentation and agree with assessment and plan that we come up with together    Vivienne Clemons,   Internal Medicine

## 2022-11-17 NOTE — CONSULTS
Inpatient consult to Cardiology  Consult performed by: Radha Marin MD  Consult ordered by: Maura Peña MD        Patient Name: Michael Wilkerson Date: 2022  5:45 PM  MR #: [id number]  : 1932    Attending Physician: Kevin Farrell DO  Reason for consult: CHF    History of Presenting Illness:      Samuel Kunz is a 80 y.o. female on hospital day 1 with a history of . History Obtained From:  patient, electronic medical record    Pt seen in Rehab. She is able to ambulate w walker and assist.  She feels stronger. No abd pain this am. Denies CP. Eating well. She was transferred to Rehab after Acute CHF   She is on long term iv ABX for Abd Abscess.    History:      EKG:  Past Medical History:   Diagnosis Date    Ascending aortic aneurysm 2017    Charcot Arleen Tooth muscular atrophy     CHF (congestive heart failure) (HCC)     Chronic diastolic CHF (congestive heart failure) ([address] 2022    Depression     Descending aortic aneurysm ([facility] 2017    Essential hypertension 2018    Headache     HTN (hypertension)     Impaired mobility and activities of daily living     Lumbar stenosis with neurogenic claudication     Myelopathy (HCC)     Osteoarthritis      Past Surgical History:   Procedure Laterality Date    IR INS PICC VAD W SQ PORT GREATER THAN 5  2022    IR INS PICC VAD W SQ PORT GREATER THAN 5 2022 [facility] SPECIAL PROCEDURE    JOINT REPLACEMENT Bilateral     knees    OTHER SURGICAL HISTORY Right 2022    cat scan guided abdominal mass aspiration with drain placement by Dr. Tameka Chandra Left 2021    LEFT PLEURAL CATHETER INSERTION performed by Dorita Elmore MD at [place] [address] Left 2021    total of 755 cc removed per Dr Schmidt  specimen sent to lab       Family History  Family History   Problem Relation Age of Onset    Arthritis Mother     Arthritis Father     High Blood Pressure Father      [] Unable to obtain due to ventilated and/ or neurologic status    Social History     Socioeconomic History    Marital status:       Spouse name: Not on file    Number of children: 2    Years of education: Not on file    Highest education level: Not on file   Occupational History    Not on file   Tobacco Use    Smoking status: Never    Smokeless tobacco: Never   Vaping Use    Vaping Use: Never used   Substance and Sexual Activity    Alcohol use: No     Alcohol/week: 0.0 standard drinks    Drug use: No    Sexual activity: Not Currently   Other Topics Concern    Not on file   Social History Narrative    , Lives With: Alone, son lives down the street, dtr is in the area    Type of Home: [address] [place] [address]: One level    Home Access: Stairs to enter with rails- Number of Steps: 2- Rails: Both    Bathroom Shower/Tub: Tub/Shower unit, Bathroom Equipment: Grab bars in shower, Shower chair    Home Equipment: Rolling walker, Cane(Pt infrequemtly uses DME for ambulation and prefers to furniture walk in home)    ADL Assistance: Independent, [address]: [place] [address] Responsibilities: Yes    Ambulation Assistance: Independent, Transfer Assistance: Independent    Additional Comments: Son stops over 2 times daily         Social Determinants of Health     Financial Resource Strain: Not on file   Food Insecurity: Not on file   Transportation Needs: Not on file   Physical Activity: Not on file   Stress: Not on file   Social Connections: Not on file   Intimate Partner Violence: Not on file   Housing Stability: Not on file      [] Unable to obtain due to ventilated and/ or neurologic status      Home Medications:      Medications Prior to Admission: Lactobacillus TABS, Take by mouth daily  piperacillin-tazobactam (ZOSYN) 4-0.5 GM per 100ML IVPB, Infuse 13.5 mg intravenously daily Pt  get 13.5 grams  over 24 hours  via easy pump  Lactobacillus Acidophilus POWD, by Does not apply daily Indications: Nutritional Support  citalopram (CELEXA) 20 MG tablet, Take 20 mg by mouth daily Indications: Depression  SYNTHROID 88 MCG tablet, Take 88 mcg by mouth daily Indications: Underactive Thyroid    Current Hospital Medications:     Scheduled Meds:   cyanocobalamin  1,000 mcg IntraMUSCular Weekly    coenzyme Q10  100 mg Oral Daily    lidocaine  3 patch TransDERmal Daily    alteplase  1 mg IntraCATHeter Once    sodium chloride flush  5-40 mL IntraVENous 2 times per day    digoxin  125 mcg Oral Daily    Vitamin D  2,000 Units Oral Dinner    rivaroxaban  15 mg Oral Daily with breakfast    levothyroxine  88 mcg Oral Daily    ferrous sulfate  325 mg Oral BID WC    carvedilol  6.25 mg Oral BID    citalopram  20 mg Oral Daily    pantoprazole  40 mg Oral Daily    lactobacillus acidophilus  4 tablet Oral TID    potassium chloride  20 mEq Oral BID    sacubitril-valsartan  1 tablet Oral BID    furosemide  40 mg Oral Daily    sucralfate  1 g Oral 4 times per day    piperacillin-tazobactam  3,375 mg IntraVENous Q8H     Continuous Infusions:  PRN Meds:.acetaminophen, bisacodyl, sodium phosphate, sodium chloride flush, ondansetron **OR** ondansetron, polyethylene glycol  . Allergies: Allergies   Allergen Reactions    Latex     Tape Chris Kanchan Tape]         Review of Systems:       Review of Systems   Constitutional: Negative. Negative for diaphoresis and fatigue. HENT: Negative. Eyes: Negative. Respiratory: Negative. Negative for cough, chest tightness, shortness of breath, wheezing and stridor. Cardiovascular: Negative. Negative for chest pain, palpitations and leg swelling. Gastrointestinal: Negative. Negative for blood in stool and nausea. Genitourinary: Negative. Musculoskeletal:  Positive for gait problem. Skin: Negative. Neurological:  Positive for weakness. Negative for dizziness, syncope and light-headedness. Hematological: Negative. Psychiatric/Behavioral: Negative. Objective Findings:     Vitals:/78   Pulse 76   Temp 98.1 °F (36.7 °C) (Oral)   Resp 18   Ht 4' 11\" (1.499 m)   Wt 149 lb 4.8 oz (67.7 kg)   SpO2 92%   BMI 30.15 kg/m²      Physical Examination:    Physical Exam   Constitutional: She appears healthy. No distress. HENT:   Normal cephalic and Atraumatic   Eyes: Pupils are equal, round, and reactive to light. Neck: Thyroid normal. No JVD present. No neck adenopathy. No thyromegaly present. Cardiovascular: Normal rate, regular rhythm, intact distal pulses and normal pulses. Murmur heard. Pulmonary/Chest: Effort normal and breath sounds normal. She has no wheezes. She has no rales. She exhibits no tenderness. Abdominal: Soft. Bowel sounds are normal. There is no abdominal tenderness. Musculoskeletal:         General: No tenderness or edema. Normal range of motion. Cervical back: Normal range of motion and neck supple. Neurological: She is alert and oriented to person, place, and time. Skin: Skin is warm. No cyanosis. Nails show no clubbing.        Results/ Medications reviewed 11/17/2022, 10:19 AM     Laboratory, Microbiology, Pathology, Radiology, Cardiology, Medications and Transcriptions reviewed  Scheduled Meds:   cyanocobalamin  1,000 mcg IntraMUSCular Weekly    coenzyme Q10  100 mg Oral Daily    lidocaine  3 patch TransDERmal Daily    alteplase  1 mg IntraCATHeter Once    sodium chloride flush  5-40 mL IntraVENous 2 times per day    digoxin  125 mcg Oral Daily    Vitamin D  2,000 Units Oral Dinner    rivaroxaban  15 mg Oral Daily with breakfast    levothyroxine  88 mcg Oral Daily    ferrous sulfate  325 mg Oral BID WC    carvedilol  6.25 mg Oral BID    citalopram  20 mg Oral Daily    pantoprazole  40 mg Oral Daily    lactobacillus acidophilus  4 tablet Oral TID    potassium chloride  20 mEq Oral BID    sacubitril-valsartan  1 tablet Oral BID    furosemide  40 mg Oral Daily    sucralfate  1 g Oral 4 times per day piperacillin-tazobactam  3,375 mg IntraVENous Q8H     Continuous Infusions:    No results for input(s): WBC, HGB, HCT, MCV, PLT in the last 72 hours. Recent Labs     11/15/22  0542 11/16/22  0529 11/17/22  0627    139 139   K 3.9 3.3* 3.9   CL 95 98 100   CO2 26 32* 29   BUN 10 11 11   CREATININE 0.55 0.52 0.54     No results for input(s): AST, ALT, ALB, BILIDIR, BILITOT, ALKPHOS in the last 72 hours. No results for input(s): LIPASE, AMYLASE in the last 72 hours. No results for input(s): PROT, INR in the last 72 hours. CTA CHEST W WO CONTRAST PE Eval    Result Date: 11/11/2022  EXAMINATION: CTA OF THE CHEST WITH AND WITHOUT CONTRAST 11/11/2022 1:53 pm TECHNIQUE: CTA of the chest was performed before and after the administration of intravenous contrast.  Multiplanar reformatted images are provided for review. MIP images are provided for review. Automated exposure control, iterative reconstruction, and/or weight based adjustment of the mA/kV was utilized to reduce the radiation dose to as low as reasonably achievable. COMPARISON: CT chest 06/22/2022 HISTORY: ORDERING SYSTEM PROVIDED HISTORY: PE TECHNOLOGIST PROVIDED HISTORY: Reason for exam:->PE Decision Support Exception - unselect if not a suspected or confirmed emergency medical condition->Emergency Medical Condition (MA) What reading provider will be dictating this exam?->CRC FINDINGS: Pulmonary Arteries: Pulmonary arteries are adequately opacified for evaluation. No evidence of intraluminal filling defect to suggest pulmonary embolism. Main pulmonary artery is normal in caliber. Mediastinum: There is severe cardiomegaly with particular enlargement of the right ventricle and atrium. Reflux of injected contrast is seen in the IVC and dilated hepatic veins suggestive of significant right heart dysfunction. No pericardial effusion is noted. Moderate coronary artery calcification is noted. Stable thoracic aortic aneurysm is seen.  The ascending aorta measures up to 5.4 cm, previously 5.4 cm. The descending aorta measures 4.6 cm, previously 4.6 cm. Lungs/Pleura: Bilateral trace pleural effusions are seen. Smooth interlobular septal thickening with ground-glass opacity is noted dependently. Bilateral lower lobe subsegmental atelectasis is also seen. Lingula subsegmental atelectasis noted. Upper Abdomen: Perihepatic ascites is noted. Soft Tissues/Bones: No acute bone or soft tissue abnormality. No evidence of pulmonary embolism. Severe cardiomegaly with evidence of congestive heart failure manifesting as mild edema and bilateral trace pleural effusions as well as significant right heart dysfunction including partially visualized perihepatic ascites. CT GUIDED NEEDLE PLACEMENT    1. Successful aspiration of left lower quadrant abdominal abscess. 2.Only a few drops of very thick gelatinous serosanguineous fluid with extensive solid debris was able to be aspirated. Due to this a drain was unable to be placed. Patient will be seen general surgery for evaluation and possible surgical intervention. HISTORY: Ines Dolan is a Female of 80 years age. DIAGNOSIS:  K65.1 Intra-abdominal abscess (HCC) ICD10 COMPARISON: None available. CT Dose-Length Product (estimate related to radiation exposure from this exam):  710.1  mGy*cm. PROCEDURE: Following the discussion of the procedure, alternatives, risks versus benefits, informed consent was obtained from the patient. Specifically, risks of after-biopsy pain at the site, rare possibility of excessive hemorrhage, infection, injury to the adjacent organs were discussed and the patient verbalized understanding. Pre-procedure evaluation confirmed that the patient was an appropriate candidate for conscious sedation. Adequate sedation was maintained during the entire procedure.   Vital signs, pulse oximetry, and response to verbal commands were monitored and recorded by the nurse throughout the procedure and the recovery period. Medical information was entered in the medical record including the medications and dosages used. The patient returned to baseline neurologic and physiologic status prior to leaving the department. No immediate sedation related complications were noted. Medication for conscious sedation was administered via IV route. 45 minutes of conscious sedation was provided. Following universal protocol, patient and site verification was performed with a \"timeout\" prior to the procedure. The patient was placed on the CT table in supine  position and the right lateral abdomen area was prepped and draped in usual sterile fashion. Using the usual sterile conditions, lidocaine and CT guidance, the complex septated abscess in the right lower  quadrant of the abdomen was accessed using a 4 [place]ese aspiration catheter. After confirmation of appropriate localization of the needle, aspiration was attempted, though the abscess was mostly solid with very thick gelatinous material with extensive solid debris and only a few drops were aspirated. The drainage catheter was removed and hemostasis was achieved by direct digital compression. The patient tolerated the procedure well. No immediate complications identified. The patient left the CT suite in supine position to the recovery room in stable condition. Total local anesthetic used: lidocaine, approximately 8 mL. Estimated blood loss:  Negligible. The patient was observed in the recovery room for two hours after the procedure and discharged in stable condition. CT ABDOMEN PELVIS W IV CONTRAST Additional Contrast? Oral    1. The right-sided abscess drain has been completely pulled out of the abscess and now lies in the right abdominal subcutaneous soft tissues. 2.The right lower abdominal abscess has increased in size when compared to prior study dating September 14 likely due to accumulation of infectious fluid due to displacement of the drainage catheter.  3.Again seen is a large cystic mass in the lower pelvis. COMPARISON: September 14, 2022 DIAGNOSIS: Right lower abdomen abscess COMMENTS: None TECHNIQUE: Spiral scanning of the chest, abdomen and pelvis was performed after  administration of intravenous contrast. CT Dose-Length Product (estimate related to radiation exposure from this exam):  314.66  mGy*cm. FINDINGS: ABDOMEN The visualized portion of the lung bases demonstrates a small effusion in the left thoracic cavity. The liver is of normal size and attenuation without focal lesions. The spleen is unremarkable. Kidneys demonstrate prompt enhancement and excretion of contrast without signs of hydronephrosis or focal mass. The pancreas and adrenals are unremarkable. The upper abdominal bowel loops appear normal. Again seen is ectasia of the visualized abdominal aorta and tortuosity, unchanged from prior. 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 There are no signs of upper abdominal fluid collections. PELVIS  Again seen is a large abscess in the right lower abdomen/pelvis abutting the cecum now measuring 6.6 x 9.4 in transverse and AP dimensions, previously measuring 6 x 6.7 and AP and transverse dimensions. The previously seen abscess drain has now been pulled out of the abscess and lies in the subcutaneous soft tissues and is not contributing to any drainage of the abscess consistent with abscess enlargement. Again seen is a large cystic mass in the pelvis. XR CHEST PORTABLE    Result Date: 11/11/2022  EXAMINATION: ONE XRAY VIEW OF THE CHEST 11/11/2022 12:42 pm COMPARISON: None. HISTORY: ORDERING SYSTEM PROVIDED HISTORY: sob TECHNOLOGIST PROVIDED HISTORY: Reason for exam:->sob What reading provider will be dictating this exam?->CRC FINDINGS: The heart is enlarged. Bilateral aspect airspace disease is noted which could represent CHF or less likely pneumonia. There is no consolidation seen within the right lung base. There is left lung base consolidation which is favored to represent either atelectasis or a pleural effusion. Note is made of a right-sided PICC line. The catheter tip is at the level of the clavicle head. Cardiomegaly with multifocal bilateral airspace disease favored to represent CHF Left lung base consolidation which could represent atelectasis, pleural effusion or less likely pneumonia. .       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 [Z74.09, Z78.9]      Priority: High    Impaired mobility and ADLs [Z74.09, Z78.9] 11/17/2022     Priority: Medium    Aortic thrombus ([address] [I74.10] 07/29/2022     Priority: Medium    Hx of drainage of abscess [Z98.890] 07/27/2022     Priority: Medium    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] 07/27/2022     Priority: Medium    Abdominal mass [R19.00] 07/19/2022     Priority: Medium    Right lower quadrant abdominal pain [R10.31] 07/18/2022     Priority: Medium    Hypokalemia [E87.6] 07/18/2022     Priority: Medium    CHF (congestive heart failure) ([address] [I50.9] 07/18/2022     Priority: Medium    Anemia [D64.9] 07/18/2022     Priority: Medium    Lumbar stenosis with neurogenic claudication [M48.062] 06/02/2016     Priority: Medium    Depression [F32. A]      Priority: Low         Impression/Plan:   chronic DHF-  She is Euvolemic. Continue PO Lasix. follow periodic labs. Echo EF 40 from prior 60. Likely related to AF.  conitnue Entresto and BB  AF- Xarelto. Rate control added DIg. In SR    Ascending AO Aneurysm 5.3 cm - no plans for surgery per pt and daughter. She has Mural Thrombus. - on Xarelto  HTN stable   Abd Abscess and discomfort - on iv ABX. Continued discomfort. CAD Moderate LAD - no CP reported. Elevated D Dimer- CT Chest - negative PE  PTOT Rehab eval     Thank you for allowing us to participate in the care of this patient. Will continue to follow. Please call if questions or concerns arise.     Electronically signed by Steve Draper MD on 11/17/2022 at 10:19 AM

## 2022-11-17 NOTE — PROGRESS NOTES
Physical Therapy Rehab Treatment Note  Facility/Department: [facility]  Room: F497/Q902-96       NAME: Fatimah Maynard  : 1932 (80 y.o.)  MRN: [id number]  CODE STATUS: Full Code    Date of Service: 2022       Restrictions:  Restrictions/Precautions: Fall Risk       SUBJECTIVE:        Pain  Pain: 5/10 pain in left side by ribs. Declined intervention. Pre and post treatment. Declined intervention. OBJECTIVE:                  Sit to Stand  Assistance Level: Stand by assist  Stand to Sit  Assistance Level: Stand by assist  Car Transfer  Assistance Level: Moderate assistance  Skilled Clinical Factors: Pt able to get into car, get own LE's in and out but needed moderate assist with sit to stand. Ambulation  Surface: Carpet  Device: Rolling walker  Distance: 40', 50'  Additional Factors: Right AFO; Left AFO; Increased time to complete (3L O2)  Assistance Level: Stand by assist  Gait Deviations: Slow raji    Stairs  Stair Height: 6''  Device: Bilateral handrails  Number of Stairs: 4  Additional Factors: Non-reciprocal going up;Non-reciprocal going down  Assistance Level: Stand by assist         Neuromuscular Education  NDT Treatment: Gait ;Standing (managing O2 cord with max cues and poor carry over with poor initiation around bolsters seet 10' apart x 3. Standing and reaching without LOB.)  Facilitation techniques: 30 sec STS = 10    PT Exercises  A/AROM Exercises: Seated: Side kicks, marching, LAQ,  Resistive Exercises: MRE'S: ADD/ABD: x 10; QUADS x 5; HS x 10          Activity Tolerance  Activity Tolerance: Patient limited by fatigue;Patient limited by pain             ASSESSMENT/PROGRESS TOWARDS GOALS:   Assessment  Assessment: Pt struggling mostly with O2 cord management with poor carry over with cues and/or poor initiation of managing cord. Pt getting mad when being corrected, and does not seem to recognize problem created by not managing cord.   Pt able to follow most verbal or non-verbal cues. Activity Tolerance: Patient limited by fatigue;Patient limited by pain  Discharge Recommendations: Continue to assess pending progress    Goals:  Long Term Goals  Long Term Goal 1: Pt to complete bed mobility with indep  Long Term Goal 2: Pt to complete transfers with indep  Long Term Goal 3: Pt to ambulate 50-150ft with LRD and Supervision - indep for 20 feet with ability to manage O2 line  Long Term Goal 4: Pt to complete 2 steps with HR and CGA    PLAN OF CARE/Safety:   Safety Devices  Type of Devices: Chair alarm in place; Left in chair      Therapy Time:   Individual   Time In 1330   Time Out 1430   Minutes 60     Minutes:60  Transfer/Bed mobility training: 10  Gait trainin  Neuro re education: 12  Manual: 10  Therapeutic ex: 46 Yahaira Boyd PTA, 22 at 4:27 PM

## 2022-11-17 NOTE — PLAN OF CARE
See OT evaluation for all goals and OT POC.  Electronically signed by OSMANI Hoffmann/L on 11/17/2022 at 12:58 PM

## 2022-11-17 NOTE — PROGRESS NOTES
Pt was seen by speech therapy this date to complete IRF CHAI which is completed with new admissions upon arrival to Rehab. Pt is from a [facility] which communities with a different dialect of [place]. Pt with limited [place] proficiency. SLP attempted to use video  (#[id number]) but pt was unable to communicate due to different dialect of [facility] language. Pt's dialect unavailable. Staff assessment completed this date due to limited ability to communicate with pt in native language.    Electronically signed by SOBIA Real on 11/17/2022 at 1:06 PM

## 2022-11-17 NOTE — PROGRESS NOTES
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     Impaired mobility and ADLs     Acute diastolic HF (heart failure) ([address] 2022    CHF (congestive heart failure), NYHA class I, acute on chronic, combined ([address] 2022    Difficulty in walking 2022    Muscle weakness (generalized) 2022    Gastro-esophageal reflux disease without esophagitis 2022    Intracardiac thrombosis, 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    Change or removal of drains 2022    Adnexal mass 2022    Intra-abdominal abscess ([address] 2022    Aortic thrombus ([address] 2022    Hx of drainage of abscess 2022    Abdominal pain 2022    Streptococcus viridans infection 2022    Abdominal mass 2022    Right lower quadrant abdominal pain 2022    Hyponatremia 2022    Hypokalemia 2022    Anemia 2022    CHF (congestive heart failure) ([address] 2022    Hypothyroid 2022    Central retinal vein occlusion, left eye, stable 2021    Lumbar stenosis with neurogenic claudication 2016    Chronic diastolic CHF (congestive heart failure) ([address] 2022    Acute cystitis with hematuria 2021    Acute on chronic combined systolic (congestive) and diastolic (congestive) heart failure ([address] 2021    Pleural effusion 2021    Hypoxia     Acute pulmonary edema (HCC)     Gait abnormality 2021    Acute on chronic combined systolic and diastolic CHF (congestive heart failure) ([address] 2021    Essential hypertension 2018    Shortness of breath Vertigo     Thoracic aortic aneurysm without rupture 06/23/2017    Descending aortic aneurysm ([address] 06/23/2017    Osteoarthritis     Myelopathy ([address]     Headache     Depression     Acquired scoliosis 06/02/2016    Osteoporosis 06/02/2016    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    Excess weight 06/02/2016    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/02/2016       Past Medical History:   Diagnosis Date    Ascending aortic aneurysm 6/23/2017    Charcot Arleen Tooth muscular atrophy     CHF (congestive heart failure) (HCC)     Chronic diastolic CHF (congestive heart failure) ([address] 4/1/2022    Depression     Descending aortic aneurysm ([address] 6/23/2017    Essential hypertension 2/16/2018    Headache     HTN (hypertension)     Impaired mobility and activities of daily living     Lumbar stenosis with neurogenic claudication     Myelopathy (HCC)     Osteoarthritis      Past Surgical History:   Procedure Laterality Date    IR INS PICC VAD W SQ PORT GREATER THAN 5  9/23/2022    IR INS PICC VAD W SQ PORT GREATER THAN 5 9/23/2022 [facility] SPECIAL PROCEDURE    JOINT REPLACEMENT Bilateral     knees    OTHER SURGICAL HISTORY Right 07/21/2022    cat scan guided abdominal mass aspiration with drain placement by Dr. 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 Interpretation is not available. Will utilize family throughout her stay as they are available. Pt is able to understand simple english and gestures for follow thru of therapy session.     Restrictions:  Restrictions/Precautions: Fall Risk     SUBJECTIVE:      Pain   No pain reported at beginning or end of session    Prior Level of Function:  Social/Functional History  Lives With: Alone  Type of Home: House  Home Layout: One level  Home Access: Stairs to enter with rails  Entrance Stairs - Number of Steps: 2  Entrance Stairs - Rails: Both  Bathroom Shower/Tub: Tub/Shower unit  Bathroom Equipment: Grab bars in shower  Home Equipment: [address], Walker, rolling (B AFOs)  ADL Assistance: Independent  Homemaking Assistance:  (Son assists with shopping, family checks on daily)  Ambulation Assistance: Independent (pt reports she uses ww for gait at home)  Transfer Assistance: Independent  Active : No  Patient's  Info: family - son lives close by; dtr lives in [place]  Additional Comments: Information gathered through chart review and confirmed with nursing and case management. Pt is [place] speaking but dialect unavailable through [address] ; family not present today    OBJECTIVE:   Vision/Hearing:  Hearing Exceptions: Hard of hearing/hearing concerns; No hearing aid    Cognition/Observation:   WFL except mild concern for safety         ROM:  RLE AROM: WFL  LLE AROM : WFL    Strength:  Strength RLE  Strength RLE: WFL  Comment: Drop foot  Strength LLE  Strength LLE: WFL  Comment: Drop foot    Neuro:  Balance  Sitting - Static: Good  Sitting - Dynamic: Good  Standing - Static: Fair (able to stand 2 min with ww with no ww for support)  Standing - Dynamic: Fair;- (mildly unsteady in gait with fatigue)                         Bed mobility  Bridging: Independent  Rolling to Left: Stand by assistance  Rolling to Right: Stand by assistance  Supine to Sit: Stand by assistance  Sit to Supine: Stand by assistance  Scooting: Stand by assistance  Bed Mobility Comments: Pt requires increased time and effort with HOB flat and no rails - concern for edge of bed safety noted    Transfers  Sit to Stand: Stand by assistance  Stand to Sit: Stand by assistance  Bed to Chair: Stand by assistance  Comment: toilet transfers SBA - cues for safety with O2 line management and reaching for surface in too great a distance   - Pt additionally transferred to and from commode with SBA for toileting and transfers    Ambulation  Surface: Level tile  Device: Rolling Walker  Other Apparatus: AFO; Left;Right;O2  Assistance: Stand by assistance;Minimal assistance  Quality of Gait: Assist to manage O2 line due to poor awareness, flexed trunk, fair maneuvering of walker  Gait Deviations: Slow [place];Decreased step length  Distance: 30 feet  Comments: Pt with 3 L O2 in place. SOB following 30 feet - O2 sat at 92%    Stairs/Curb  Stairs?: Yes  Stairs  # Steps : 4  Stairs Height: 6\"  Rails: Bilateral  Assistance:  Moderate assistance;Minimal assistance  Comment: mild post LOB with descent requiring recovery assistance; mild SOB following with O2 sat at 92% following with 3L O2 in place              Activity Tolerance  Activity Tolerance: Patient tolerated evaluation without incident;Patient limited by endurance  Activity Tolerance Comments: Pt with O2 in place at 3L with [facility]2 sat [facility] 92[facility]         Quality Indicators (IRF-CHAI):  Rolling L and R: Supervision or Touching Assistance - 4  Sit>Supine: Supervision or Touching Assistance - 4  Supine>Sit: Supervision or Touching Assistance - 4  Sit>Stand: Supervision or Touching Assistance - 4  Chair/Bed>Chair Transfer: Supervision or Touching Assistance - 4  Car Transfers: Not attempted due to Environmental Limitations (i.e. weather, equipment unavailable) - 10  Walk 10 ft: Supervision or Touching Assistance - 4  Walk 50 ft with two 90 degree turns: Not attempted due to Medical Condition or Safety Concerns (I.e. unsafe or physician orders) - 80  Walk 150 ft in [address]: Not attempted due to Medical Condition or Safety Concerns (I.e. unsafe or physician orders) - 88  Walking 10 ft on Unlevel Surface: Not attempted due to Medical Condition or Safety Concerns (I.e. unsafe or physician orders) - 80  Picking up Objects from Standing Position: Not attempted due to Medical Condition or Safety Concerns (I.e. unsafe or physician orders) - 80  Stairs: No Not attempted due to medical condition or safety concerns (i.e. unsafe or physician order) - 88  WC Mobility: No Not Applicable (pt did not complete item prior to admission) - 9    ASSESSMENT:  Body Structures, Functions, Activity Limitations Requiring Skilled Therapeutic Intervention: Decreased functional mobility ; Decreased ADL status; Decreased strength;Decreased safe awareness;Decreased endurance;Decreased balance;Decreased cognition;Decreased vision/visual deficit; Decreased coordination;Decreased body mechanics; Decreased posture  Decision Making: Medium Complexity  History: High  Exam: Med  Clinical Presentation: med    Barriers to Learning: language barrier           CLINICAL IMPRESSION: Pt demonstrates deficits as listed. She is requiring assistance with mobility and safety at this time. Pt is normally at an indep level of function.  Pt is in need of PT prior to safe return to home    PLAN OF CARE:  Frequency: 1-2 treatment sessions per day, 5-7 days per week     Current Treatment Recommendations: Strengthening, ROM, Balance training, Functional mobility training, Transfer training, ADL/Self-care training, Endurance training, Gait training, Stair training, Neuromuscular re-education, Home exercise program, Safety education & training, Patient/Caregiver education & training, Equipment evaluation, education, & procurement, Positioning    Patient's Goal: Pt did not state       GOALS:  Long Term Goals  Long Term Goal 1: Pt to complete bed mobility with indep  Long Term Goal 2: Pt to complete transfers with indep  Long Term Goal 3: Pt to ambulate 50-150ft with LRD and Supervision - indep for 20 feet with ability to manage O2 line  Long Term Goal 4: Pt to complete 2 steps with HR and CGA    ELOS:   Therapy Duration: 1 Week    Therapy Time:    Individual   Time In 0930   Time Out 1005   Minutes 35     Eval: 25 min  Transfers: 10 min          Smith Watt PT, 11/17/22 at 11:22 AM

## 2022-11-17 NOTE — PROGRESS NOTES
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     Impaired mobility and ADLs     Acute diastolic HF (heart failure) ([address] 2022    CHF (congestive heart failure), NYHA class I, acute on chronic, combined ([address] 2022    Difficulty in walking 2022    Muscle weakness (generalized) 2022    Gastro-esophageal reflux disease without esophagitis 2022    Intracardiac thrombosis, 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    Change or removal of drains 2022    Adnexal mass 2022    Intra-abdominal abscess ([address] 2022    Aortic thrombus ([address] 2022    Hx of drainage of abscess 2022    Abdominal pain 2022    Streptococcus viridans infection 2022    Abdominal mass 2022    Right lower quadrant abdominal pain 2022    Hyponatremia 2022    Hypokalemia 2022    Anemia 2022    CHF (congestive heart failure) ([address] 2022    Hypothyroid 2022    Central retinal vein occlusion, left eye, stable 2021    Lumbar stenosis with neurogenic claudication 2016    Chronic diastolic CHF (congestive heart failure) ([address] 2022    Acute cystitis with hematuria 2021    Acute on chronic combined systolic (congestive) and diastolic (congestive) heart failure ([address] 2021    Pleural effusion 2021    Hypoxia     Acute pulmonary edema (HCC)     Gait abnormality 2021    Acute on chronic combined systolic and diastolic CHF (congestive heart failure) ([address] 2021    Essential hypertension 2018    Shortness of breath Vertigo     Thoracic aortic aneurysm without rupture 06/23/2017    Descending aortic aneurysm ([address] 06/23/2017    Osteoarthritis     Myelopathy ([address]     Headache     Depression     Acquired scoliosis 06/02/2016    Osteoporosis 06/02/2016    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    Excess weight 06/02/2016    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/02/2016       Past Medical History:   Diagnosis Date    Ascending aortic aneurysm 6/23/2017    Charcot Arleen Tooth muscular atrophy     CHF (congestive heart failure) (HCC)     Chronic diastolic CHF (congestive heart failure) ([address] 4/1/2022    Depression     Descending aortic aneurysm ([address] 6/23/2017    Essential hypertension 2/16/2018    Headache     HTN (hypertension)     Impaired mobility and activities of daily living     Lumbar stenosis with neurogenic claudication     Myelopathy (HCC)     Osteoarthritis      Past Surgical History:   Procedure Laterality Date    IR INS PICC VAD W SQ PORT GREATER THAN 5  9/23/2022    IR INS PICC VAD W SQ PORT GREATER THAN 5 9/23/2022 [facility] SPECIAL PROCEDURE    JOINT REPLACEMENT Bilateral     knees    OTHER SURGICAL HISTORY Right 07/21/2022    cat scan guided abdominal mass aspiration with drain placement by Dr. 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 \" Pt with limited English proficiency  General Comment  Comments: Pt is well known to rehab.  Pt is from [address] [place] [address] off [facility]; dialiect is unavailable via ipad . Family occasionally used as available, and pt able to follow simple verbal and visual cues well. Pt. does understand some English. Patient's date of birth confirmed: Yes     Pain at start of treatment: No    Pain at end of treatment: No    Location:   Nursing notified: Not Applicable  RN:   Intervention: None    Social Functional:  Social/Functional History  Lives With: Alone  Type of Home: House  Home Layout: One level  Home Access: Stairs to enter with rails  Bathroom Shower/Tub: Tub/Shower unit  Bathroom Equipment: Grab bars in [place]: Cane (B AFOs)  ADL Assistance: Independent  Homemaking Assistance:  (Son assists with shopping, family checks on daily)  Ambulation Assistance: Independent (Cane  and B AFOs)  Transfer Assistance: Independent  Active : No  Patient's  Info: family - son lives close by; dtr lives in [place]  Additional Comments: Information gathered through chart review and confirmed with nursing and case management. Pt is [place] speaking but dialect unavailable through [facility] [address] ; family not present today    Objective:    Self Care Status:  ADL  Feeding: Independent  Grooming: Setup  UE Bathing: Setup  LE Bathing: Supervision  UE Dressing: Minimal assistance  UE Dressing Skilled Clinical Factors: Declines bra, assist pulling shirt all the the way down in the back. LE Dressing: Dependent/Total  LE Dressing Skilled Clinical Factors: Assist with B socks, B shoes, pulling pant legs through d/t stretchy pants, and assist pulling pants up over hips. Has long handled shoe horn from d[place]ri, was unable to utilize this date. Toileting: Moderate assistance  Toileting Skilled Clinical Factors: Assist with pants  Additional Comments: Pt completed shower ADL as above.  Fatigues quickly with frequent rest breaks  Toilet Transfers  Toilet - Technique: Stand step  Equipment Used: Grab bars  Toilet Transfer: Stand by assistance  Toilet Transfers Comments: Slow transfer, no AFOs d/t pt about to shower  Shower Transfers  Shower - Transfer From: [facility] - Transfer Type: To and From  Shower - Transfer To: Shower seat with back  Shower - Technique: Stand pivot  Shower Transfers: Stand by assistance  Shower Transfers Comments: Increased time      Functional Mobility:  Balance  Sitting Balance: Modified independent   Standing Balance: Supervision  Functional Mobility  Functional - Mobility Device: Wheelchair  Activity: To/from bathroom  Assist Level: Dependent/Total  Functional Mobility Comments: Did not    Bed mobility and transfers:  Transfers  Sit to stand: Stand by assistance  Stand to sit: Stand by assistance      Observation:  Observation/Palpation  Posture: Fair (Forward flexed, increased T kyphosis)  Observation: No acute distress noted. Pt pleasant and motivated. Pt requests to remove her O2 for shower which she indicates she does at home. Desats to 88% with mild mobility but 85% with bending. O2 reapplied (3L) and pt agreeable to wear for rest of session. RN aware. Pt also has IV paused by RN for shower and PICC line covered RN reinitiated IV at end of session. Orientation and Cognition:  Orientation  Overall Orientation Status: Within Functional Limits  Orientation Level: Oriented to person;Oriented to place;Oriented to situation (Difficulty fully assessing d/t LEP)  Cognition  Overall Cognitive Status: WFL  Cognition Comment: Appears WFL, has limited [place] proficiency but able to follow visual cues, makes good safety choices for mobility    Pt's current cognitive status is:  Comprehension: Mod I  Expression: Mod I  Social Interaction: Mod I  Problem Solving: Supervision  Memory: Supervision    Vision and Hearing:  Vision  Vision:  (Per chart review, has glasses but does not wear)  Hearing  Hearing: Exceptions to [facility]  Hearing Exceptions: Hard of hearing/hearing concerns; No hearing aid  Vision - Basic Assessment  Prior Vision:  [facility] for room mobility)  Patient Visual Report: No visual complaint reported. Perception  Overall Perceptual Status: WFL    Range of Motion:  LUE AROM (degrees)  LUE AROM : WFL  LUE General AROM: Arthritic changes throughout  Left Hand AROM (degrees)  Left Hand AROM: WFL  Left Hand General AROM: Arthritic changes throughout  RUE AROM (degrees)  RUE AROM : WFL  RUE General AROM: Arthritic changes throughout  Right Hand AROM (degrees)  Right Hand AROM: WFL  Right Hand General AROM: Arthritic changes throughout      Strength:  LUE Strength  Gross LUE Strength: Exceptions to [facility]  L Shoulder Flex: 3/5  L Shoulder Ext: 3/5  L Shoulder ABduction: 3/5  L Shoulder ADduction: 3/5  L Shoulder Horiz ABduction: 3/5  L Shoulder Horiz ADduction: 3/5  L Elbow Flex: 3/5  L Elbow Ext: 3/5  L Wrist Flex: 3/5  L Wrist Ext: 3/5  L Hand General: 3/5  LUE Strength Comment: Fatigues quickly  RUE Strength  Gross RUE Strength: Exceptions to [facility] [place]  R Shoulder Flex: 3/5  R Shoulder Ext: 3/5  R Shoulder ABduction: 3/5  R Shoulder ADduction: 3/5  R Shoulder Horiz ABduction: 3/5  R Shoulder Horiz ADduction: 3/5  R Elbow Flex: 3/5  R Elbow Ext: 3/5  R Wrist Flex: 3/5  R Wrist Ext: 3/5  R Hand General: 3/5    Quality of Movement: Tone RUE  RUE Tone: Normotonic  Tone LUE  LUE Tone: Normotonic  Coordination  Movements Are Fluid And Coordinated: No  Coordination and Movement Description: Fine motor impairments;Decreased speed;Decreased accuracy; Right UE;Left UE  Quality of Movement Other  Comment: Fine motor deficits d/t arthritic changes in hands, difficulty d/t coordination deficits    Sensation:  Sensation  Overall Sensation Status: WFL (As able to assess)    Hand Dominance  Hand Dominance: Right    Following evaluation, pt with additional 15 minutes OT time. Pt utilized 1/2 lb free weight 2 x 10 reps shoulder flexion/extension, elbow flexion/extension and wrist flexion/extension each UE to improve strength and endurance for ADL tasks.  Pt tolerates well but requires rest between each activity d/t SOB. Safety:  Safety Devices  Type of Devices: Call light within reach; Chair alarm in place; Left in chair    Assessment:  Activity Tolerance  Activity Tolerance: Patient Tolerated treatment well    Assessment  Performance deficits / Impairments: Decreased functional mobility ; Decreased ADL status; Decreased strength;Decreased high-level IADLs;Decreased endurance;Decreased balance;Decreased coordination  Assessment: Pt is a 80year old woman from home who [facility] to [facility] with decreased balance and endurance, who demonstrates the above deficits which impact her ability to perform ADLs and IADls at her baseline level of independence. Pt limited d/t fatigue and weakness. Pt would benefit from continued OT to maximize independence and safety with ADL tasks. Prognosis: Good  Decision Making: Medium Complexity  History: Pt's medical history is moderately complex  Exam: Pt. has 7 performance deficits  Assistance / Modification: Pt requires min-mod A  Discharge Recommendations: Continue to assess pending progress  Plan  REQUIRES OT FOLLOW-UP: Yes    Equipment needed:  OT Equipment Recommendations  Other: Continue to assess    OT Education:  Education Given To: Patient  Education Provided: Role of Therapy, Plan of Care, Transfer Training, Safety  Education Method: Demonstration, Verbal  Barriers to Learning: Other (Comment) (Language)  Education Outcome: Other (Comment) (Limited d/t limited English proficiency)      Plan:  Occupational Therapy Plan  Times Per Week: 5-7  Therapy Duration: 4-7 Days  Current Treatment Recommendations: Balance training;Functional mobility training; Endurance training;Strengthening;Pain management; Safety education & training;Patient/Caregiver education & training;Equipment evaluation, education, & procurement;Home management training;Self-Care / ADL    Goals:  Patient goals : \"To go home. \"    Time Frame for Long Term Goals :  Within 1 week pt will improve in the following areas in order to reach specific goals as outlined in initial evaluation  Long Term Goal 1: Pt will improve ADL independence  Long Term Goal 2: Pt. will improve balance and safety with transfers  Long Term Goal 3: Pt will improve UE strength and endurance for ADL and transfer tasks    - Patient will complete self care as followed using the recommended adaptive equipment and/or adaptive techniques as instructed:  Feeding: Independent  Grooming: Independent  Bathing: Mod I  UE Dressing: Mod I  LE Dressing: Mod I  Toileting: Mod I  Toilet Transfer: Mod I  Shower/Tub Transfer: Mod I    - Patient will sequence self-care routine with G safety and no verbal/tactile cues    - Patient will improve static and dynamic standing balance to complete pants management at Mod I level    - Patient will improve functional endurance to tolerate/complete 60 minutes of ADLs. - Patient will improve B UE strength and endurance to 4/5 in order to participate in self-care activities as projected. - Patient will improve B hand fine motor coordination to [facility] in order to manage clothing fasteners/self-care containers in a timely manner    - Patient will perform kitchen mobility at device level without episodes of LOB and good safety awareness     - Patient will perform basic room mobility at Mod I level. - Patient will access appropriate D/C site with as few architectural barriers as possible. - Patient's cognition will improve or maintain baseline to safely perform ADLs:  Comprehension: Mod I  Expression: Mod I  Social Interaction: Independent  Problem Solving: Supervision  Memory: Mod I     Therapy Time:   Individual   Time In 0830   Time Out 0930   Minutes 60     Therapeutic activities: 15 minutes  Eval: 45 minutes     Electronically signed by:     ESRAFIN Correa,   11/17/2022, 12:39 PM

## 2022-11-17 NOTE — PLAN OF CARE
Problem: Safety - Adult  Goal: Free from fall injury  11/17/2022 0749 by Lizeth Pickett RN  Outcome: Progressing  11/16/2022 2155 by Hernan Lujan RN  Outcome: Progressing     Problem: ABCDS Injury Assessment  Goal: Absence of physical injury  11/17/2022 0749 by Lizeth Pickett RN  Outcome: Progressing  11/16/2022 2155 by Hernan Lujan RN  Outcome: Progressing     Problem: Skin/Tissue Integrity  Goal: Absence of new skin breakdown  Description: 1. Monitor for areas of redness and/or skin breakdown  2. Assess vascular access sites hourly  3. Every 4-6 hours minimum:  Change oxygen saturation probe site  4. Every 4-6 hours:  If on nasal continuous positive airway pressure, respiratory therapy assess nares and determine need for appliance change or resting period.   Outcome: Progressing     Problem: Chronic Conditions and Co-morbidities  Goal: Patient's chronic conditions and co-morbidity symptoms are monitored and maintained or improved  Outcome: Progressing  Flowsheets (Taken 11/16/2022 2010 by Hernan Lujan RN)  Care Plan - Patient's Chronic Conditions and Co-Morbidity Symptoms are Monitored and Maintained or Improved: Monitor and assess patient's chronic conditions and comorbid symptoms for stability, deterioration, or improvement     Problem: Pain  Goal: Verbalizes/displays adequate comfort level or baseline comfort level  Outcome: Progressing

## 2022-11-18 LAB — MAGNESIUM: 2 MG/DL (ref 1.7–2.4)

## 2022-11-18 PROCEDURE — 6360000002 HC RX W HCPCS: Performed by: INTERNAL MEDICINE

## 2022-11-18 PROCEDURE — 99233 SBSQ HOSP IP/OBS HIGH 50: CPT | Performed by: PHYSICAL MEDICINE & REHABILITATION

## 2022-11-18 PROCEDURE — 97112 NEUROMUSCULAR REEDUCATION: CPT

## 2022-11-18 PROCEDURE — 2700000000 HC OXYGEN THERAPY PER DAY

## 2022-11-18 PROCEDURE — 36415 COLL VENOUS BLD VENIPUNCTURE: CPT

## 2022-11-18 PROCEDURE — 6370000000 HC RX 637 (ALT 250 FOR IP): Performed by: INTERNAL MEDICINE

## 2022-11-18 PROCEDURE — 83735 ASSAY OF MAGNESIUM: CPT

## 2022-11-18 PROCEDURE — 2580000003 HC RX 258: Performed by: INTERNAL MEDICINE

## 2022-11-18 PROCEDURE — 97535 SELF CARE MNGMENT TRAINING: CPT

## 2022-11-18 PROCEDURE — 97110 THERAPEUTIC EXERCISES: CPT

## 2022-11-18 PROCEDURE — 1180000000 HC REHAB R&B

## 2022-11-18 PROCEDURE — 97530 THERAPEUTIC ACTIVITIES: CPT

## 2022-11-18 PROCEDURE — 97116 GAIT TRAINING THERAPY: CPT

## 2022-11-18 PROCEDURE — 6370000000 HC RX 637 (ALT 250 FOR IP): Performed by: PHYSICAL MEDICINE & REHABILITATION

## 2022-11-18 RX ADMIN — POTASSIUM CHLORIDE 20 MEQ: 1500 TABLET, EXTENDED RELEASE ORAL at 08:45

## 2022-11-18 RX ADMIN — LEVOTHYROXINE SODIUM 88 MCG: 88 TABLET ORAL at 05:36

## 2022-11-18 RX ADMIN — Medication 10 ML: at 21:30

## 2022-11-18 RX ADMIN — PANTOPRAZOLE SODIUM 40 MG: 40 TABLET, DELAYED RELEASE ORAL at 08:45

## 2022-11-18 RX ADMIN — CARVEDILOL 6.25 MG: 6.25 TABLET, FILM COATED ORAL at 21:19

## 2022-11-18 RX ADMIN — DIGOXIN 125 MCG: 125 TABLET ORAL at 08:45

## 2022-11-18 RX ADMIN — PIPERACILLIN AND TAZOBACTAM 3375 MG: 3; .375 INJECTION, POWDER, FOR SOLUTION INTRAVENOUS at 00:06

## 2022-11-18 RX ADMIN — CITALOPRAM HYDROBROMIDE 20 MG: 20 TABLET ORAL at 08:45

## 2022-11-18 RX ADMIN — SUCRALFATE 1 G: 1 TABLET ORAL at 16:36

## 2022-11-18 RX ADMIN — SUCRALFATE 1 G: 1 TABLET ORAL at 10:55

## 2022-11-18 RX ADMIN — LACTOBACILLUS TAB 4 TABLET: TAB at 21:19

## 2022-11-18 RX ADMIN — CARVEDILOL 6.25 MG: 6.25 TABLET, FILM COATED ORAL at 08:45

## 2022-11-18 RX ADMIN — ACETAMINOPHEN 650 MG: 325 TABLET ORAL at 08:46

## 2022-11-18 RX ADMIN — LACTOBACILLUS TAB 4 TABLET: TAB at 14:41

## 2022-11-18 RX ADMIN — SUCRALFATE 1 G: 1 TABLET ORAL at 05:36

## 2022-11-18 RX ADMIN — FUROSEMIDE 40 MG: 40 TABLET ORAL at 08:45

## 2022-11-18 RX ADMIN — Medication 2000 UNITS: at 16:36

## 2022-11-18 RX ADMIN — RIVAROXABAN 15 MG: 15 TABLET, FILM COATED ORAL at 08:45

## 2022-11-18 RX ADMIN — PIPERACILLIN AND TAZOBACTAM 3375 MG: 3; .375 INJECTION, POWDER, FOR SOLUTION INTRAVENOUS at 21:53

## 2022-11-18 RX ADMIN — FERROUS SULFATE TAB 325 MG (65 MG ELEMENTAL FE) 325 MG: 325 (65 FE) TAB at 16:36

## 2022-11-18 RX ADMIN — SACUBITRIL AND VALSARTAN 1 TABLET: 24; 26 TABLET, FILM COATED ORAL at 08:45

## 2022-11-18 RX ADMIN — FERROUS SULFATE TAB 325 MG (65 MG ELEMENTAL FE) 325 MG: 325 (65 FE) TAB at 08:45

## 2022-11-18 RX ADMIN — SUCRALFATE 1 G: 1 TABLET ORAL at 00:05

## 2022-11-18 RX ADMIN — Medication 100 MG: at 08:45

## 2022-11-18 RX ADMIN — Medication 10 ML: at 11:00

## 2022-11-18 RX ADMIN — SACUBITRIL AND VALSARTAN 1 TABLET: 24; 26 TABLET, FILM COATED ORAL at 21:19

## 2022-11-18 RX ADMIN — POTASSIUM CHLORIDE 20 MEQ: 1500 TABLET, EXTENDED RELEASE ORAL at 21:19

## 2022-11-18 RX ADMIN — LACTOBACILLUS TAB 4 TABLET: TAB at 08:45

## 2022-11-18 RX ADMIN — PIPERACILLIN AND TAZOBACTAM 3375 MG: 3; .375 INJECTION, POWDER, FOR SOLUTION INTRAVENOUS at 10:58

## 2022-11-18 NOTE — PROGRESS NOTES
Nutrition Assessment     Type and Reason for Visit: Initial, Consult    Nutrition Recommendations/Plan:    Continue Current Diet     Malnutrition Assessment:  Malnutrition Status: No malnutrition    Nutrition Assessment:  Pt appears stable from a nutritional standpoint, with verbalized good appetite, noted good intake at meals, with no nutritional complaints. To continue to monitor. dings:   PMH-htn, CHF, Charcot Arleen Tooth Neuropathy, R shayy abdominal abscess. Meds/labs reviewed. Trace BLE edema noted. Wound Type: None    Current Nutrition Therapies:    ADULT DIET; Regular;  Low Sodium (2 gm)    Anthropometric Measures:  Height: 4' 11\" (149.9 cm)  Current Body Wt:  150lb (adm, bedscale)  BMI:  30.3    Nutrition Diagnosis:   No nutrition diagnosis at this time related to   as evidenced by      Nutrition Interventions:   Food and/or Nutrient Delivery: Continue Current Diet  Nutrition Education/Counseling: No recommendation at this time  Coordination of Nutrition Care: Continue to monitor while inpatient       Goals:     Goals: PO intake 75% or greater       Nutrition Monitoring and Evaluation:      Food/Nutrient Intake Outcomes: Food and Nutrient Intake  Physical Signs/Symptoms Outcomes: Weight, Biochemical Data, Fluid Status or Edema    Maryanne Oneill RD, LD

## 2022-11-18 NOTE — PROGRESS NOTES
Physical Therapy Rehab Treatment Note  Facility/Department: [facility]  Room: B588/Z112-97       NAME: Nayeli Rosales  : 1932 (80 y.o.)  MRN: [id number]  CODE STATUS: Full Code    Date of Service: 2022  Other (Comment): Pt speaks a [place] dialect from a remote [place] [id number]. Interpretation is not available. Will utilize family throughout her stay as they are available. Pt is able to understand simple english and gestures for follow thru of therapy session. Restrictions:  Restrictions/Precautions: Fall Risk    SUBJECTIVE:      Pain head ache 3/10. Pt already medicated. OBJECTIVE:         Bed mobility  Rolling to Left: Modified independent  Rolling to Right: Modified independent  Supine to Sit: Minimal assistance  Sit to Supine: Modified independent  Bed Mobility Comments: Pt requires increased time and effort. VCs for positioning once supine. Increased effort with sup to sit. Pt reports dizziness with initial sup to sit. Transfers  Sit to Stand: Supervision  Stand to Sit: Supervision              PT Exercises  Exercise Treatment: hooklying march x10, heelslides 2x10, SAQ x20, s/l clams 2x10            ASSESSMENT/PROGRESS TOWARDS GOALS: Focused treatment on bed mobility and LE therex. Increased effort and reports of dizziness with sup to sit. Goals:  Long Term Goals  Long Term Goal 1: Pt to complete bed mobility with indep  Long Term Goal 2: Pt to complete transfers with indep  Long Term Goal 3: Pt to ambulate 50-150ft with LRD and Supervision - indep for 20 feet with ability to manage O2 line  Long Term Goal 4: Pt to complete 2 steps with HR and CGA    PLAN OF CARE/Safety: Cont per POC.        Therapy Time:   Individual   Time In 1000   Time Out 1030   Minutes 30     Minutes:  Transfer/Bed mobility trainin  Gait trainin  Neuro re education:0  Therapeutic ex:25    Daniel Monge PTA, 22 at 10:27 AM

## 2022-11-18 NOTE — PLAN OF CARE
Problem: Discharge Planning  Goal: Discharge to home or other facility with appropriate resources  Outcome: Progressing     Problem: Safety - Adult  Goal: Free from fall injury  Outcome: Progressing     Problem: ABCDS Injury Assessment  Goal: Absence of physical injury  Outcome: Progressing     Problem: Skin/Tissue Integrity  Goal: Absence of new skin breakdown  Description: 1. Monitor for areas of redness and/or skin breakdown  2. Assess vascular access sites hourly  3. Every 4-6 hours minimum:  Change oxygen saturation probe site  4. Every 4-6 hours:  If on nasal continuous positive airway pressure, respiratory therapy assess nares and determine need for appliance change or resting period. Outcome: Progressing     Problem: Chronic Conditions and Co-morbidities  Goal: Patient's chronic conditions and co-morbidity symptoms are monitored and maintained or improved  Outcome: Progressing     Problem: Pain  Goal: Verbalizes/displays adequate comfort level or baseline comfort level  Outcome: Progressing     Problem: Occupational Therapy - Adult  Goal: By Discharge: Performs self-care activities at highest level of function for planned discharge setting. See evaluation for individualized goals.   11/17/2022 1258 by Erik De La Cruz OTR/L  Outcome: Progressing

## 2022-11-18 NOTE — PROGRESS NOTES
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    Restrictions  Restrictions/Precautions  Restrictions/Precautions: Fall Risk        Patient's date of birth confirmed: Yes    SAFETY:  Safety Devices  Type of devices: All fall risk precautions in place    SUBJECTIVE:  Subjective: I am okay  Pain: no pain    Pain at start of treatment: No    Pain at end of treatment: No    Location: N/A  Nursing notified: Not Applicable  RN: N/A  Intervention: None    COGNITION:  Orientation  Overall Orientation Status: Within Functional Limits  Cognition  Overall Cognitive Status: WFL      OBJECTIVE:    Toileting  Skilled Clinical Factors: Min assist to pull up pants  Toilet Transfers  Assistance Level: Stand by assist      Functional Mobility  Device: Rolling walker  Activity: To/From bathroom  Assistance Level: Supervision    Pt tasked with using BUEs to slide bean bags forward onto inclined ramp sitting on table. Ramp induced progressive shoulder flexion. Pt required to push bags off of the back of the ramp. Pt completed the activity- alternating between L and R UEs. Pt at times unable to progress bag to end of ramp- and was then instructed to push as far as possible and off to the side of the ramp. BUE fatigued during activity. Several rest breaks needed.      Equipment recommendations:  OT Equipment Recommendations  Other: Continue to assess      ASSESSMENT:  Activity Tolerance: Patient tolerated treatment well      PLAN OF CARE:  Balance training, Functional mobility training, Endurance training, Strengthening, Pain management, Safety education & training, Patient/Caregiver education & training, Equipment evaluation, education, & procurement, Home management training, Self-Care / ADL  Continue with OT plan of care    Patient goals : \"To go home. \"  Time Frame for Long Term Goals :  Within 1 week pt will improve in the following areas in order to reach specific goals as outlined in initial evaluation  Long Term Goal 1: Pt will improve ADL independence  Long Term Goal 2: Pt. will improve balance and safety with transfers  Long Term Goal 3: Pt will improve UE strength and endurance for ADL and transfer tasks        Therapy Time:   Individual Group Co-Treat   Time In 1300       Time Out 1330         Minutes 30           ADL/IADL training: 15 minutes  Therapeutic activities: 15 minutes     Electronically signed by:    Rodney Sandhu OT,   11/18/2022, 1:53 PM

## 2022-11-18 NOTE — PROGRESS NOTES
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 Other complicating issues include intra-abdominal abscess and is getting IV Invanz. Though her CHF has been getting better she continues to have nausea and poor p.o. intake from her abdominal issues. Apparently her drainage catheter came out about 2 to 4 weeks ago she was hospitalized and interventional radiology took her back to retrain it though the ultimate plan will be with a [facility] for possible a larger drain. drain was accidentally pulled out a few weeks ago. .      I am concerned about patients medical complexities and barriers to advancing in rehab goals including  need for long term IV antibiotics. I reviewed current care and plans for further care with other rehab providers including nursing and case management. According to recent nursing note, \" VSS. Pt has no distress or discomfort. 3L NC on continuous. IV Zosyn given with no adverse reactions. LBM 11/17/2022. Safety measures in place. Call light in reach. \".  3  ROS x10: The patient also complains of severely impaired mobility and activities of daily living. Otherwise no new problems with vision, hearing, nose, mouth, throat, dermal, cardiovascular, GI, , pulmonary, musculoskeletal, psychiatric or neurological. See also Acute Rehab PM&R H&P.        Vital signs:  /74   Pulse 92   Temp 97.4 °F (36.3 °C) (Oral)   Resp 18   Ht 4' 11\" (1.499 m)   Wt 150 lb (68 kg)   SpO2 95%   BMI 30.30 kg/m²   I/O:   PO/Intake:  fair PO intake,  reg diet low salt    Bowel:   continent   Bladder: continent   no taylor  General:  Patient is well developed,   adequately nourished, and    well ta.     HEENT:    Pupils equal, hearing intact to loud voice, external inspection of ear and nose benign. Inspection of lips, tongue and gums benign    Musculoskeletal: No significant change in strength or tone. All joints stable. Inspection and palpation of digits and nails show no clubbing, cyanosis or inflammatory conditions. Neuro/Psychiatric: Affect: flat but pleasant. Alert and oriented to person, place and situation with min cues. No significant change in deep tendon reflexes or sensation  Lungs:  Diminished, CTA-B. Respiration effort is   normal at rest.     Heart:   S1 = S2,   RRR. Abdomen:  Soft,  generalized mostly epigastric-tender, no enlargement of liver or spleen. Extremities:   mild lower extremity edema    Skin:   Intact to general survey,  BUE bruises, old rlq drain site --no ss drainage    Rehabilitation:  Physical Therapy:   Bed mobility:  Bed mobility  Bridging: Independent (11/17/22 1018)  Rolling to Left: Stand by assistance (11/17/22 1018)  Rolling to Right: Stand by assistance (11/17/22 1018)  Supine to Sit: Stand by assistance (11/17/22 1018)  Sit to Supine: Stand by assistance (11/17/22 1018)  Scooting: Stand by assistance (11/17/22 1018)  Bed Mobility Comments: Pt requires increased time and effort with HOB flat and no rails - concern for edge of bed safety noted (11/17/22 1018)  Transfers:  Transfers  Sit to Stand: Stand by assistance (11/17/22 0948)  Stand to Sit: Stand by assistance (11/17/22 0948)  Bed to Chair: Stand by assistance (11/17/22 0948)  Comment: toilet transfers SBA - cues for safety with O2 line management and reaching for surface in too great a distance (11/17/22 0948)  Sit to Stand  Assistance Level: Stand by assist (11/17/22 1402)  Stand to Sit  Assistance Level: Stand by assist (11/17/22 1402)  Car Transfer  Assistance Level:  Moderate assistance (11/17/22 1402)  Skilled Clinical Factors: Pt able to get into car, get own LE's in and out but needed moderate assist with sit to stand. (11/17/22 1402)  Gait:   Ambulation  Surface: Level tile (11/17/22 0949)  Device: Rolling Walker (11/17/22 0251)  Other Apparatus: AFO; Left;Right;O2 (11/17/22 9231)  Assistance: Stand by assistance;Minimal assistance (11/17/22 0949)  Quality of Gait: Assist to manage O2 line due to poor awareness, flexed trunk, fair maneuvering of walker (11/17/22 0949)  Gait Deviations: Slow Raji;Decreased step length (11/17/22 0949)  Distance: 30 feet (11/17/22 0949)  Comments: Pt with 3 L O2 in place. SOB following 30 feet - O2 sat at 92% (11/17/22 0949)  Ambulation  Surface: Carpet (11/17/22 1403)  Device: Rolling walker (11/17/22 1403)  Distance: 40', 50' (11/17/22 1403)  Additional Factors: Right AFO; Left AFO; Increased time to complete (3L O2) (11/17/22 1403)  Assistance Level: Stand by assist (11/17/22 1403)  Gait Deviations: Slow raji (11/17/22 1403)  Stairs:  Stairs/Curb  Stairs?: Yes (11/17/22 0949)  Stairs  # Steps : 4 (11/17/22 0949)  Stairs Height: 6\" (11/17/22 0949)  Rails: Bilateral (11/17/22 0949)  Assistance: Moderate assistance;Minimal assistance (11/17/22 0949)  Comment: mild post LOB with descent requiring recovery assistance; mild SOB following with O2 sat at 92% following with 3L O2 in place (11/17/22 0949)  Stairs  Stair Height: 6'' (11/17/22 1403)  Device: Bilateral handrails (11/17/22 1403)  Number of Stairs: 4 (11/17/22 1403)  Additional Factors: Non-reciprocal going up;Non-reciprocal going down (11/17/22 1403)  Assistance Level: Stand by assist (11/17/22 1403)  W/C mobility:       Occupational Therapy:   Hand Dominance: Right  ADL  Feeding: Independent (11/17/22 1011)  Grooming: Setup (11/17/22 1011)  UE Bathing: Setup (11/17/22 1011)  LE Bathing: Supervision (11/17/22 1011)  UE Dressing: Minimal assistance (11/17/22 1011)  UE Dressing Skilled Clinical Factors: Declines bra, assist pulling shirt all the the way down in the back.  (11/17/22 1011)  LE Dressing: Dependent/Total (11/17/22 1011)  LE Dressing Skilled Clinical Factors: Assist with B socks, B shoes, pulling pant legs through d/t stretchy pants, and assist pulling pants up over hips. Has long handled shoe horn from dimtri, was unable to utilize this date. (11/17/22 1011)  Toileting: Moderate assistance (11/17/22 1011)  Toileting Skilled Clinical Factors: Assist with pants (11/17/22 1011)  Additional Comments: Pt completed shower ADL as above. Fatigues quickly with frequent rest breaks (11/17/22 1011)  Toilet Transfers  Toilet - Technique: Stand step (11/17/22 1226)  Equipment Used: Grab bars (11/17/22 1226)  Toilet Transfer: Stand by assistance (11/17/22 1226)  Toilet Transfers Comments: Slow transfer, no AFOs d/t pt about to shower (11/17/22 1226)     Shower Transfers  Shower - Transfer From: Wheelchair (11/17/22 1226)  Shower - Transfer Type: To and From (11/17/22 1226)  Shower - Transfer To:  Shower seat with back (11/17/22 1226)  Shower - Technique: Stand pivot (11/17/22 1226)  Shower Transfers: Stand by assistance (11/17/22 1226)  Shower Transfers Comments: Increased time (11/17/22 1226)    Speech Therapy:            Diet/Swallow:                      Lab/X-ray studies reviewed, analyzed and discussed with patient and staff:   Recent Results (from the past 24 hour(s))   Basic Metabolic Panel w/ Reflex to MG    Collection Time: 11/17/22 10:34 AM   Result Value Ref Range    Sodium 135 135 - 144 mEq/L    Potassium reflex Magnesium 4.1 3.4 - 4.9 mEq/L    Chloride 94 (L) 95 - 107 mEq/L    CO2 28 20 - 31 mEq/L    Anion Gap 13 9 - 15 mEq/L    Glucose 113 (H) 70 - 99 mg/dL    BUN 12 8 - 23 mg/dL    Creatinine 0.49 (L) 0.50 - 0.90 mg/dL    Est, Glom Filt Rate >60.0 >60    Calcium 8.8 8.5 - 9.9 mg/dL   CBC with Auto Differential    Collection Time: 11/17/22 10:34 AM   Result Value Ref Range    WBC 7.6 4.8 - 10.8 K/uL    RBC 4.54 4.20 - 5.40 M/uL    Hemoglobin 12.3 12.0 - 16.0 g/dL    Hematocrit 37.5 37.0 - 47.0 %    MCV 82.7 79.4 - 94.8 fL    MCH 27.1 27.0 - 31.3 pg    MCHC 32.8 (L) 33.0 - 37.0 %    RDW 17.7 (H) 11.5 - 14.5 %    Platelets 173 686 - 564 K/uL    Neutrophils % 71.9 %    Lymphocytes % 13.8 %    Monocytes % 10.1 %    Eosinophils % 3.5 %    Basophils % 0.7 %    Neutrophils Absolute 5.5 1.4 - 6.5 K/uL    Lymphocytes Absolute 1.0 1.0 - 4.8 K/uL    Monocytes Absolute 0.8 0.2 - 0.8 K/uL    Eosinophils Absolute 0.3 0.0 - 0.7 K/uL    Basophils Absolute 0.1 0.0 - 0.2 K/uL   High sensitivity CRP    Collection Time: 11/17/22 10:34 AM   Result Value Ref Range    CRP High Sensitivity 39.2 (H) 0.0 - 5.0 mg/L       CTA CHEST  11/11/2022  No evidence of pulmonary embolism. Severe cardiomegaly with evidence of congestive heart failure manifesting as mild edema and bilateral trace pleural effusions as well as significant right heart dysfunction including partially visualized perihepatic ascites. CT GUIDED NEEDLE PLACEMENT1. Successful aspiration of left lower quadrant abdominal abscess. 2.Only a few drops of very thick gelatinous serosanguineous fluid with extensive solid debris was able to be aspirated. Due to this a drain was unable to be placed. Patient will be seen general surgery for evaluation and possible surgical intervention. CT ABDOMEN PELVIS      1. The right-sided abscess drain has been completely pulled out of the abscess and now lies in the right abdominal subcutaneous soft tissues. 2.The right lower abdominal abscess has increased in size when compared to prior study dating September 14 likely due to accumulation of infectious fluid due to displacement of the drainage catheter. 3.Again seen is a large cystic mass in the lower pelvis. ABDOMEN The visualized portion of the lung bases demonstrates a small effusion in the left thoracic cavity. The liver is of normal size and attenuation without focal lesions. The spleen is unremarkable.    Kidneys demonstrate prompt enhancement and excretion of contrast without signs of hydronephrosis or focal mass. The pancreas and adrenals are unremarkable. The upper abdominal bowel loops appear normal. Again seen is ectasia of the visualized abdominal aorta and tortuosity, unchanged from prior. 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 There are no signs of upper abdominal fluid collections. PELVIS  Again seen is a large abscess in the right lower abdomen/pelvis abutting the cecum now measuring 6.6 x 9.4 in transverse and AP dimensions, previously measuring 6 x 6.7 and AP and transverse dimensions. The previously seen abscess drain has now been pulled out of the abscess and lies in the subcutaneous soft tissues and is not contributing to any drainage of the abscess consistent with abscess enlargement. Again seen is a large cystic mass in the pelvis. XR CHEST  11/11/2022    The heart is enlarged. Bilateral aspect airspace disease is noted which could represent CHF or less likely pneumonia. There is no consolidation seen within the right lung base. There is left lung base consolidation which is favored to represent either atelectasis or a pleural effusion. Note is made of a right-sided PICC line. The catheter tip is at the level of the clavicle head. Cardiomegaly with multifocal bilateral airspace disease favored to represent CHF Left lung base consolidation which could represent atelectasis, pleural effusion or less likely pneumonia. .        Previous extensive, complex labs, notes and diagnostics reviewed and analyzed. ALLERGIES:    Allergies as of 11/16/2022 - Fully Reviewed 11/16/2022   Allergen Reaction Noted    Latex  07/19/2017    Tape [adhesive tape]  06/28/2016      (please also verify by checking MAR)     Today I evaluated this patient for periodic reassessment of medical and functional status.   The patient was discussed in detail at the treatment team meeting focusing on current medical issues, progress in therapies, social issues, psychological issues, barriers to progress and strategies to address these barriers, and discharge planning. See the addendum to rehab progress note-as a second progress note in the chart. The patient continues to be high risk for future disability and their medical and rehabilitation prognosis continue to be good and therefore, we will continue the patient's rehabilitation course as planned. The patient's tentative discharge date was set. Patient and family education was discussed. The patient was made aware of the team discussion regarding their progress. 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 Functional and medical status reassessed regarding patients ability to participate in therapies and patient found to be able to participate in acute intensive comprehensive inpatient rehabilitation program including PT/OT to improve balance, ambulation, ADLs, and to improve the P/AROM. Therapeutic modifications regarding activities in therapies, place, amount of time per day and intensity of therapy made daily. In bed therapies or bedside therapies prn. Bowel and Bladder dysfunction  , Neurogenic bowel and bladder:  frequent toileting, ambulate to bathroom with assistance, check post void residuals. Check for C.difficile x1 if >2 loose stools in 24 hours, continue bowel & bladder program.  Monitor bowel and bladder function. Lactinex 2 PO every AC. MOM prn, Brown Bomb prn, Glycerin suppository prn, enema prn. Encourage therapy and nursing to co-treat and problem solve re continence. Severe abd pain as well as generalized OA pain: reassess pain every shift and prior to and after each therapy session, give prn Tylenol and consider scheduled Tylenol, modalities prn in therapy, masage, Lidoderm, K-pad prn. Consider scheduled AM pain meds.   Skin healing abd and breakdown risk:  continue pressure relief program.  Daily skin exams and reports from nursing. Fatigue due to nutritional and hydration deficiency: Add and titrate vitamin B12 vitamin D and CoQ10 continue to monitor I&Os, calorie counts prn, dietary consult prn. Add healthy snack at night. Acute episodic insomnia with situational adjustment disorder:  prn Ambien, monitor for day time sedation. Falls risk elevated:  patient to use call light to get nursing assistance to get up, bed and chair alarm. Elevated DVT risk: progressive activities in PT, continue prophylaxis PORTILLO hose, elevation and  Xaralto . Complex discharge planning:  Weekly team meeting every Monday to re-assess progress towards goals, discuss and address social, psychological and medical comorbidities and to address difficulties they may be having progressing in therapy. Patient and family education is in progress. The patient is to follow-up with their family physician after discharge. Complex Active General Medical Issues that complicate care Assess & Plan:    Charcot-Arleen tooth neuropathy-bilateral AFOs   A-fib,   CHF (congestive heart failure,  Aortic thrombus   -Acute rehab to monitor heart rate and rhythm with the option of telemetry and the effects of chronotropic medication with respect to increasing physical activity and exercise in PT, OT, ADLs with medication titration to lowest effective dosing. Continue blood signs every shift focusing on heart rate, rhythm and blood pressure checks with orthostatic checks-monitoring the effect of exercise, therapy and posture. Consult hospitalist for backup medical and adjust/add medications ( Entresto, Xarelto, Lasix, Lanoxin, Coreg). Monitor heart rate and blood pressure as well as medications effects on vital signs before during and after therapy with especial focus on preventing orthostasis and falls risk.     Lumbar stenosis with neurogenic claudication    Hypokalemia-check BMP and supplement as needed,      Right lower quadrant abdominal pain dt   Abdominal mass  Hx of drainage of abscess,  Streptococcus viridans infection-IV antibiotics patient and family have been trained. Reconsult infectious disease     Anemia-Monitor vital signs monitor for orthostasis and tachycardia, check H&H prn. Vitamin B12 and iron-dose iron with food to prevent GI upset. Monitor for constipation. Monitor stools for blood. Palliative care encounter-for pain as needed    Depression-emotional support provided daily, vitamin B12, encourage participation in rehabilitation support group and recreational therapy, adjust/add medications ( Celexa )  GERD-Elevate head of bed after meals, monitor stools for blood, lowest effective dose of PPI, consider Tums. Focus of today's plan-  Initiate and modify therapuetic plan to meet patients individual needs, add rest breaks as needed, and Focus on emotional health and caregiver involvement in care.       Electronically signed by Magdaleno Miller DO on 11/18/22 at 7:41 AM DIANE Loaiza D.O., PM&R     Attending    [address] [place], [place] [address]rakan Cabrera

## 2022-11-18 NOTE — PROGRESS NOTES
Physical Therapy Rehab Treatment Note  Facility/Department: [facility]  Room: N6/K061-59       NAME: Sincere Hall  : 1932 (80 y.o.)  MRN: [id number]  CODE STATUS: Full Code    Date of Service: 2022       Restrictions:  Restrictions/Precautions: Fall Risk       SUBJECTIVE:   Subjective: I feel good. \"You're funny. I like you. \"  Pt reported fatigue several times during treatment. Pain  Pain: Pre and post treatment 010      OBJECTIVE:                  Sit to Stand  Assistance Level: Supervision  Stand to Sit  Assistance Level: Supervision      Ambulation  Surface: Carpet  Device: Rolling walker  Distance: 50' x 2  Additional Factors: Right AFO; Left AFO; Increased time to complete (3L O2)  Assistance Level: Supervision;Stand by assist  Gait Deviations: Slow raji;Decreased step length bilateral         PT Exercises  A/AROM Exercises: Seated: Side kicks, marching, LAQ, ADD with ball: x 20  Resistive Exercises: HS with RTB x 20          Activity Tolerance  Activity Tolerance: Patient tolerated treatment well             ASSESSMENT/PROGRESS TOWARDS GOALS:   Assessment  Assessment: Pt needing increased rest breaks this PM.  Gait consistently at least 50' and steady. Activity Tolerance: Patient tolerated treatment well  Discharge Recommendations: Continue to assess pending progress    Goals:  Long Term Goals  Long Term Goal 1: Pt to complete bed mobility with indep  Long Term Goal 2: Pt to complete transfers with indep  Long Term Goal 3: Pt to ambulate 50-150ft with LRD and Supervision - indep for 20 feet with ability to manage O2 line  Long Term Goal 4: Pt to complete 2 steps with HR and CGA    PLAN OF CARE/Safety:   Safety Devices  Type of Devices: Chair alarm in place; Left in chair; All fall risk precautions in place      Therapy Time:   Individual   Time In 1330   Time Out 1400   Minutes 30     Minutes:30  Transfer/Bed mobility trainin  Gait training: 10  Neuro re education: 0  Therapeutic ex: [address], PTA, 11/18/22 at 3:48 PM

## 2022-11-18 NOTE — PROGRESS NOTES
Assessment complete. VSS. Pt has no distress or discomfort. 3L NC on continuous. IV Zosyn given with no adverse reactions. LBM 11/17/2022. Safety measures in place. Call light in reach. Electronically signed by Tiffanie Asencio RN on 11/18/2022 at 2:37 AM

## 2022-11-18 NOTE — CARE COORDINATION
Updated the patient and tried to call her son no answer and no VM set up. Patient aware of DC11/23/2022. [facility] has had [facility] and may be current. Patient may need IV antibiotics at Home.   Electronically signed by Rakesh Valdivia RN on 11/18/22 at 5:05 PM EST

## 2022-11-18 NOTE — PROGRESS NOTES
Physical Therapy Rehab Treatment Note  Facility/Department: [facility]  Room: [facility]/R564-24       NAME: Merced Rodriguez  : 1932 (80 y.o.)  MRN: [id number]  CODE STATUS: Full Code    Date of Service: 2022       Restrictions:  Restrictions/Precautions: Fall Risk       SUBJECTIVE:   Subjective: I have a headache    Pain  Pain: Number not given but pt reports 'it's down' since taking tylenol earlier. Post treatment pt states headache is \"better\" and at a 3/10. Medication already taken . OBJECTIVE:                  Sit to Stand  Assistance Level: Supervision  Stand to Sit  Assistance Level: Supervision  Car Transfer  Assistance Level: Minimal assistance  Skilled Clinical Factors: Assist only with sit to stand and cues for technique. Ambulation  Surface: Carpet; Level surface  Device: Rolling walker  Distance: 76'  Additional Factors: Right AFO; Left AFO; Increased time to complete (3L O2)  Assistance Level: Stand by assist;Supervision    Stairs  Stair Height: 6''  Device: Bilateral handrails  Number of Stairs: 4  Additional Factors: Non-reciprocal going up;Non-reciprocal going down  Assistance Level: Stand by assist         Neuromuscular Education  NDT Treatment: Gait   Facilitation techniques: Managing O2 cord around obstacles for 20' x 2. Pt able to complete task with < 25% cues today. Activity Tolerance  Activity Tolerance: Patient tolerated treatment well             ASSESSMENT/PROGRESS TOWARDS GOALS:   Assessment  Assessment: Pt progressing quickly towards all goals. Pt's management skills of O2 cord with tremendous improvement vs yesterday from Max cues or 100% of time to < 25% of time. Pt demonstrating all functional mobility safely and only struggles with sit to stand from car. Pt should reach independence quickly.   Activity Tolerance: Patient tolerated treatment well  Discharge Recommendations: Continue to assess pending progress    Goals:  Long Term Goals  Long Term Goal 1: Pt to complete bed mobility with indep  Long Term Goal 2: Pt to complete transfers with indep  Long Term Goal 3: Pt to ambulate 50-150ft with LRD and Supervision - indep for 20 feet with ability to manage O2 line  Long Term Goal 4: Pt to complete 2 steps with HR and CGA    PLAN OF CARE/Safety:   Safety Devices  Type of Devices: Chair alarm in place; Left in chair; All fall risk precautions in place      Therapy Time:   Individual   Time In 0930   Time Out 1000   Minutes 30     Minutes:30  Transfer/Bed mobility training: 10  Gait training: 10  Neuro re education: 10  Therapeutic ex: 0      Justino Loya PTA, 11/18/22 at 12:21 PM

## 2022-11-18 NOTE — CARE COORDINATION
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    CO MORBIDITIES:      Past Medical History:   Diagnosis Date    Ascending aortic aneurysm 2017    Charcot Arleen Tooth muscular atrophy     CHF (congestive heart failure) (HCC)     Chronic diastolic CHF (congestive heart failure) ([address] 2022    Depression     Descending aortic aneurysm ([facility] 2017    Essential hypertension 2018    Headache     HTN (hypertension)     Impaired mobility and activities of daily living     Lumbar stenosis with neurogenic claudication     Myelopathy (HCC)     Osteoarthritis      Past Surgical History:   Procedure Laterality Date    IR INS PICC VAD W SQ PORT GREATER THAN 5  2022    IR INS PICC VAD W SQ PORT GREATER THAN 5 2022 [facility] SPECIAL PROCEDURE    JOINT REPLACEMENT Bilateral     knees    OTHER SURGICAL HISTORY Right 2022    cat scan guided abdominal mass aspiration with drain placement by Dr. Regis Rubin Left 2021    LEFT PLEURAL CATHETER INSERTION performed by Kirill Ruano MD at [place] [id number] Left 2021    total of 755 cc removed per Dr Paige Franco specimen sent to lab     Other (Comment): Pt speaks a [place] dialect from a remote [place] [id number]. Interpretation is not available. Will utilize family throughout her stay as they are available. Pt is able to understand simple english and gestures for follow thru of therapy session.   Restrictions  Restrictions/Precautions: Fall Risk  CASE MANAGEMENT    Social/Functional History  Social/Functional History  Lives With: Alone  Type of Home: House  Home Layout: One level  Home Access: Stairs to enter with rails  Entrance Stairs - Number of Steps: 2  Entrance Stairs - Rails: Both  Bathroom Shower/Tub: Tub/Shower unit  Bathroom Equipment: Grab bars in shower  Home Equipment: Cane (B AFOs)  Has the patient had two or more falls in the past year or any fall with injury in the past year?: No  ADL Assistance: Independent  Homemaking Assistance:  (Son assists with shopping, family checks on daily)  Ambulation Assistance: Independent (Cane  and B AFOs)  Transfer Assistance: Independent  Active : No  Patient's  Info: family - son lives close by; dtr lives in [place]  Mode of Transportation: newScale  Education: no  Occupation: Retired  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 Pt is [place] speaking but dialect unavailable through [facility] ; family not present today       Pts personal preferences: none    Pts assets/resources/support system: Supportive son and DIL, an [facility]    COVERAGE INFORMATION:Payor: MEDICARE / Plan: MEDICARE PART A AND B / Product Type: *No Product type* /       NURSING  No active isolations    Weight: 150 lb (68 kg) / Body mass index is 30.3 kg/m². ADULT DIET; Regular; Low Sodium (2 gm)    SpO2: 95 % (11/17/22 1930)  O2 Flow Rate (L/min): 3 L/min (11/17/22 1930)    Oxygen to be continued upon discharge: Yes  Home-going needs (nocturnal Pox, sleep study, RX, equipment): Yes    Skin Issues: Yes  Home-going needs (education, training, RX, Wound RN): Yes  PICC LINE  Pain Managed: Yes  Crystal Islas this time  Bladder continence: Yes  Discharging with Taylor: No   Training done: No   Urology following: No   Plan/date to remove taylor: No   Bladder retraining started: No    Bowel continence:  Yes  Last BM date: 11/18  Need for bowel program: No    Anticoagulants: Xarelto  Home-going needs (Education, labs):  Yes    Antibiotics: Zosyn Q8  Stop date: TBD  Home-going needs (education, RX, PICC): Yes      Other[facility] at [facility]      PHYSICAL THERAPY  Bed mobility:  Bed mobility  Bridging: Independent (11/17/22 1018)  Rolling to Left: Modified independent (11/18/22 1007)  Rolling to Right: Modified independent (11/18/22 1007)  Supine to Sit: Minimal assistance (11/18/22 1026)  Sit to Supine: Modified independent (11/18/22 1007)  Scooting: Stand by assistance (11/17/22 1018)  Bed Mobility Comments: Pt requires increased time and effort. VCs for positioning once supine. Increased effort with sup to sit. Pt reports dizziness with initial sup to sit. (11/18/22 1026)  Transfers:  Bed mobility  Bridging: Independent (11/17/22 1018)  Rolling to Left: Modified independent (11/18/22 1007)  Rolling to Right: Modified independent (11/18/22 1007)  Supine to Sit: Minimal assistance (11/18/22 1026)  Sit to Supine: Modified independent (11/18/22 1007)  Scooting: Stand by assistance (11/17/22 1018)  Bed Mobility Comments: Pt requires increased time and effort. VCs for positioning once supine. Increased effort with sup to sit. Pt reports dizziness with initial sup to sit. (11/18/22 1026)  Gait:   Ambulation  Surface: Level tile (11/17/22 0949)  Device: Rolling Walker (11/17/22 0949)  Other Apparatus: AFO; Left;Right;O2 (11/17/22 6721)  Assistance: Stand by assistance;Minimal assistance (11/17/22 0949)  Quality of Gait: Assist to manage O2 line due to poor awareness, flexed trunk, fair maneuvering of walker (11/17/22 0949)  Gait Deviations: Slow Raji;Decreased step length (11/17/22 0949)  Distance: 30 feet (11/17/22 0949)  Comments: Pt with 3 L O2 in place. SOB following 30 feet - O2 sat at 92% (11/17/22 0949)  Ambulation  Surface: Carpet; Level surface (11/18/22 1000)  Device: Rolling walker (11/18/22 1000)  Distance: 75' (11/18/22 1000)  Additional Factors: Right AFO; Left AFO; Increased time to complete (3L O2) (11/18/22 1000)  Assistance Level: Stand by assist;Supervision (11/18/22 1000)  Gait Deviations: Slow raji (11/17/22 1403)  Stairs:  Stairs/Curb  Stairs?: Yes (11/17/22 0949)  Stairs  # Steps : 4 (11/17/22 0949)  Stairs Height: 6\" (11/17/22 0949)  Rails: Bilateral (11/17/22 0949)  Assistance: Moderate assistance;Minimal assistance (11/17/22 0949)  Comment: mild post LOB with descent requiring recovery assistance; mild SOB following with O2 sat at 92% following with 3L O2 in place (11/17/22 0949)  Stairs  Stair Height: 6'' (11/18/22 1000)  Device: Bilateral handrails (11/18/22 1000)  Number of Stairs: 4 (11/18/22 1000)  Additional Factors: Non-reciprocal going up;Non-reciprocal going down (11/18/22 1000)  Assistance Level: Stand by assist (11/18/22 1000)  W/C mobility:     LTG:  Long Term Goal 1: Pt to complete bed mobility with indep  Long Term Goal 2: Pt to complete transfers with indep  Long Term Goal 3: Pt to ambulate 50-150ft with LRD and Supervision - indep for 20 feet with ability to manage O2 line  Long Term Goal 4: Pt to complete 2 steps with HR and CGA  PT Treatment Time:  1.5 hrs      OCCUPATIONAL THERAPY    EVALUATION SELF CARE STATUS:  Hand Dominance: Right  Feeding: Independent (11/17/22 1011)  Grooming: Setup (11/17/22 1011)  UE Bathing: Setup (11/17/22 1011)  LE Bathing: Supervision (11/17/22 1011)  UE Dressing: Minimal assistance (11/17/22 1011)  UE Dressing Skilled Clinical Factors: Declines bra, assist pulling shirt all the the way down in the back. (11/17/22 1011)  LE Dressing: Dependent/Total (11/17/22 1011)  LE Dressing Skilled Clinical Factors: Assist with B socks, B shoes, pulling pant legs through d/t stretchy pants, and assist pulling pants up over hips. Has long handled shoe horn from dmitri, was unable to utilize this date. (11/17/22 1011)  Toileting: Moderate assistance (11/17/22 1011)  Toileting Skilled Clinical Factors: Assist with pants (11/17/22 1011)  Additional Comments: Pt completed shower ADL as above.  Fatigues quickly with frequent rest breaks (11/17/22 1011)             CURRENT SELF CARE:  Upper Extremity Dressing  Assistance Level: Supervision (11/17/22 1611)  Skilled Clinical Factors: To doff t-shirt (11/17/22 1611)  Lower Extremity Dressing  Assistance Level: Moderate assistance (11/17/22 1611)  Skilled Clinical Factors: To doff pants. Assistance to doff pants past feet (11/17/22 1611)        LTG:  Eating:Discharge Goal: Independent  Oral Hygiene:Discharge Goal: Independent  Shower/Bathe self: Discharge Goal: Independent  Upper body dressing:Discharge Goal: Independent  Lower body dressing:Discharge Goal: Independent  Putting on taking off footwear:Discharge Goal: Independent   Toileting: Discharge Goal: Independent  Toilet transfer:Discharge Goal: Independent  OT Treatment Time: 1.5 hrs      SPEECH THERAPY                 Diet/Swallow:                       LTG:                COGNITION  OT: Cognition Comment: Appears WFL, has limited [place] proficiency but able to follow visual cues, makes good safety choices for mobility  SP:        RECREATIONAL THERAPY  Attendance to recreational therapy programs:    []  Pet Therapy  [] Music Therapy  [] Art Therapy    [] Recreation Therapy Group [] Support Group           Patient social interaction (mood, participation): Pleasant, god participation    Patient strengths: Motivated    Patients goal: Go Home    Problems/Barriers: IV antibiotics, weakness, language        1. Safety:          - Intervention / Plan:    [x]  falls protocol     [x]  PT/OT    [x]  SP        - Results:         2. Potential DME needs:         - Intervention / Plan:  [x]  PT/OT     []  Assess equipment needs/access       - Results:         3. Weakness:          - Intervention / Plan:  [x]  PT/OT      []  Other:         - Results:         4.   Discharge planning needs:          - Intervention / Plan:  [x]  Weekly team conference      [x]  family training        - Results:         5.            - Intervention / Plan:          - Results:         6.            - Intervention / Plan:         - Results:         7.            - Intervention / Plan:         - Results:           Discharge Plan   Estimated Length of Stay: To be Determined    Tentative Discharge date: 11/23      Anticipated Discharge Destination:  Home      Team recommendations:    1. Follow up Therapy :    PT  OT  RN  [place] Aide    2. Home Health    Other:     Equipment needed at Discharge: Other: TBD      Team Members Present at Conference:    Physician: Dr. Kasey Alexis  : Rosea Olszewski, RN  RN: Yashira Pride.  Viki Ames RN  Physical Therapist: Hazel Alfonso PT  Occupational Therapist: Betty Yeager OTR  Speech Therapist: Miryam Gillespie, SLP  Electronically signed by Frankie Steel RN on 11/18/22 at 5:02 PM EST

## 2022-11-18 NOTE — PROGRESS NOTES
INDIVIDUALIZED OVERALL REHAB PLAN OF CARE  ADDENDUM TO REHAB PROGRESS NOTE-for audit purposes must also refer to this day's clinical note and combine the information      Date: 2022  Patient Name: Laura Wright   Room: T831/T387-13    MRN: [id number]    : 1932  (80 y.o.)  Gender: female       Today 2022 during weekly team meeting, I reviewed the patient Laura Wright in detail with the therapists and nurses involved in patient's care gathering complex physiatric data regarding current medical issues, progress in therapies, factors limiting progress, social issues, psychological issues, ongoing therapeutic plans and discharge planning. Legend:  I= independent Im =Modified independent  S=Supervised SB=stand by MAGANA=set up CG=contact fran Min= minimal Mod=Moderate Max=maximal Max of 2 =maximal assist of 2 people      CURRENT FUNCTIONAL STATUS:        NURSING ISSUES:      VSS. Pt has no distress or discomfort. 3L NC on continuous. IV Zosyn given with no adverse reactions. LBM 2022. Safety measures in place. Nursing will continue to focus on bowel and bladder continence transitioning toward independence by time of discharge. Monitoring post void residuals monitoring for severe constipation and bowel obstruction.       Barriers to progress and discharge complex medical conditions, severe pain, complex social situations, and long term IV antibiotics      Bowel function- continent/normal  Plans to address- scheduled voids     Bladder function-  continent    Plans to address- schedule voids before bed and therapy     Skin deficits- dressing changes   Plans to address- continue to monitor closely for infection     Hydration/Nutritional deficits- monitoring for dysphagia  Plans to address-Push PO, assist with feeds as needed      abnormal BP- decline in BP intake is a concern  Plans to address- check ortho BPs before therapies-and use abdominal binder and TEDs as needed    Pt and Family training goals-  teach patient and family home medication administration charting      Focus on achieving ADL goals with co-treating with OT when possible. Focus on cognition and co treat with SLP when possible. PHYSICAL THERAPY  Bed mobility:  Bed mobility  Bridging: Independent (11/17/22 1018)  Rolling to Left: Stand by assistance (11/17/22 1018)  Rolling to Right: Stand by assistance (11/17/22 1018)  Supine to Sit: Stand by assistance (11/17/22 1018)  Sit to Supine: Stand by assistance (11/17/22 1018)  Scooting: Stand by assistance (11/17/22 1018)  Bed Mobility Comments: Pt requires increased time and effort with HOB flat and no rails - concern for edge of bed safety noted (11/17/22 1018)  Transfers:  Transfers  Sit to Stand: Stand by assistance (11/17/22 0948)  Stand to Sit: Stand by assistance (11/17/22 0948)  Bed to Chair: Stand by assistance (11/17/22 0948)  Comment: toilet transfers SBA - cues for safety with O2 line management and reaching for surface in too great a distance (11/17/22 0948)  Sit to Stand  Assistance Level: Stand by assist (11/17/22 1402)  Stand to Sit  Assistance Level: Stand by assist (11/17/22 1402)  Car Transfer  Assistance Level: Moderate assistance (11/17/22 1402)  Skilled Clinical Factors: Pt able to get into car, get own LE's in and out but needed moderate assist with sit to stand. (11/17/22 1402)  Gait:   Ambulation  Surface: Level tile (11/17/22 0949)  Device: Rolling Walker (11/17/22 0786)  Other Apparatus: AFO; Left;Right;O2 (11/17/22 4656)  Assistance: Stand by assistance;Minimal assistance (11/17/22 0949)  Quality of Gait: Assist to manage O2 line due to poor awareness, flexed trunk, fair maneuvering of walker (11/17/22 0949)  Gait Deviations: Slow Lea;Decreased step length (11/17/22 0949)  Distance: 30 feet (11/17/22 0949)  Comments: Pt with 3 L O2 in place.  SOB following 30 feet - O2 sat at 92% (11/17/22 0949)  Ambulation  Surface: Carpet (11/17/22 1403)  Device: Rolling walker (11/17/22 1403)  Distance: 40', 50' (11/17/22 1403)  Additional Factors: Right AFO; Left AFO; Increased time to complete (3L O2) (11/17/22 1403)  Assistance Level: Stand by assist (11/17/22 1403)  Gait Deviations: Slow raji (11/17/22 1403)  Stairs:  Stairs/Curb  Stairs?: Yes (11/17/22 0949)  Stairs  # Steps : 4 (11/17/22 0949)  Stairs Height: 6\" (11/17/22 0949)  Rails: Bilateral (11/17/22 0949)  Assistance: Moderate assistance;Minimal assistance (11/17/22 0949)  Comment: mild post LOB with descent requiring recovery assistance; mild SOB following with O2 sat at 92% following with 3L O2 in place (11/17/22 0949)  Stairs  Stair Height: 6'' (11/17/22 1403)  Device: Bilateral handrails (11/17/22 1403)  Number of Stairs: 4 (11/17/22 1403)  Additional Factors: Non-reciprocal going up;Non-reciprocal going down (11/17/22 1403)  Assistance Level: Stand by assist (11/17/22 1403)  W/C mobility:           Pt and Family training goals-  Focus on sequencing and endurance        OCCUPATIONAL THERAPY  Upper Extremity Dressing  Assistance Level: Supervision (11/17/22 1611)  Skilled Clinical Factors: To doff t-shirt (11/17/22 1611)  Lower Extremity Dressing  Assistance Level: Moderate assistance (11/17/22 1611)  Skilled Clinical Factors: To doff pants. Assistance to doff pants past feet (11/17/22 1611)  ADL  Feeding: Independent (11/17/22 1011)  Grooming: Setup (11/17/22 1011)  UE Bathing: Setup (11/17/22 1011)  LE Bathing: Supervision (11/17/22 1011)  UE Dressing: Minimal assistance (11/17/22 1011)  UE Dressing Skilled Clinical Factors: Declines bra, assist pulling shirt all the the way down in the back. (11/17/22 1011)  LE Dressing: Dependent/Total (11/17/22 1011)  LE Dressing Skilled Clinical Factors: Assist with B socks, B shoes, pulling pant legs through d/t stretchy pants, and assist pulling pants up over hips. Has long handled shoe horn from dmitri, was unable to utilize this date.  (11/17/22 1011)  Toileting: Moderate assistance (11/17/22 1011)  Toileting Skilled Clinical Factors: Assist with pants (11/17/22 1011)  Additional Comments: Pt completed shower ADL as above. Fatigues quickly with frequent rest breaks (11/17/22 1011)  Toilet Transfers  Toilet - Technique: Stand step (11/17/22 1226)  Equipment Used: Grab bars (11/17/22 1226)  Toilet Transfer: Stand by assistance (11/17/22 1226)  Toilet Transfers Comments: Slow transfer, no AFOs d/t pt about to shower (11/17/22 1226)    Plans for addressing ADL deficits affecting: Focus on sequencing. Skin care and hygiene deficits- complex wound dressing changes affecting ADLs   Plans to address- coordinate patient dressing changes education with nursing as part of ADL training                SPEECH THERAPY/SLP             Plans for cognitive and communication issues affecting addressing:   Pt and Family training goals-  teach home tecnology options like Pretty use and home assistive devices       Diet/Swallow:                           COGNITION  OT: Cognition Comment: Appears WFL, has limited [address] proficiency but able to follow visual cues, makes good safety choices for mobility @FLOWTIME(304  SP:        Social History     Socioeconomic History    Marital status:       Spouse name: Not on file    Number of children: 2    Years of education: Not on file    Highest education level: Not on file   Occupational History    Not on file   Tobacco Use    Smoking status: Never    Smokeless tobacco: Never   Vaping Use    Vaping Use: Never used   Substance and Sexual Activity    Alcohol use: No     Alcohol/week: 0.0 standard drinks    Drug use: No    Sexual activity: Not Currently   Other Topics Concern    Not on file   Social History Narrative    , Lives With: Alone, son lives down the street, dtr is in the area    Type of Home: [address]: One level    Home Access: Stairs to enter with rails- Number of Steps: 2- Rails: Both    Bathroom Shower/Tub: Tub/Shower unit, Bathroom Equipment: Grab bars in shower, Shower chair    Home Equipment: Rolling walker, Cane(Pt infrequemtly uses DME for ambulation and prefers to furniture walk in home)    ADL Assistance: Independent, [address] [facility] [address] [facility] Responsibilities: Yes    Ambulation Assistance: Independent, Transfer Assistance: Independent    Additional Comments: Son stops over 2 times daily         Social Determinants of Health     Financial Resource Strain: Not on file   Food Insecurity: Not on file   Transportation Needs: Not on file   Physical Activity: Not on file   Stress: Not on file   Social Connections: Not on file   Intimate Partner Violence: Not on file   Housing Stability: Not on file           THERAPY, MEDICAL AND NURSING COORDINATION:    [x]  Pain medication before therapies     [x]  Check orthostatic BP and monitor heart rate and medications effects with therapy      [x]  Ambulate to the bathroom in room    [x]  Add scheduled rest beaks     [x]  In room therapies as needed      Discharge date set for:              11/23/22      Home with:   alone  with help from   family            And:      Home Health Care:     [x]  PT    [x]  OT    []  ST   [x]  Aide   []  SW    [x]  RN                 Equipment:  [employer], home IV      At D/C their function is goaled at:   PT:Long Term Goal 1: Pt to complete bed mobility with indep  Long Term Goal 2: Pt to complete transfers with indep  Long Term Goal 3: Pt to ambulate 50-150ft with LRD and Supervision - indep for 20 feet with ability to manage O2 line  Long Term Goal 4: Pt to complete 2 steps with HR and CGA  OT:Eating  Assistance Needed: Independent  CARE Score: 6  Discharge Goal: Independent, Oral Hygiene  Assistance Needed: Setup or clean-up assistance  CARE Score: 5  Discharge Goal: Independent, [address] needed: Supervision or touching assistance  CARE Score: 4  Discharge Goal: Independent, Shower/Bathe Self  Assistance Needed: Supervision or touching assistance  CARE Score: 4  Discharge Goal: Independent  Upper Body Dressing  Assistance Needed: Supervision or touching assistance  CARE Score: 4  Discharge Goal: Independent, Lower Body Dressing  Assistance Needed: Dependent  CARE Score: 1  Discharge Goal: Independent, Putting On/Taking Off Footwear  Assistance Needed: Dependent  CARE Score: 1  Discharge Goal: Independent, Toilet Transfer  Assistance needed: Supervision or touching assistance  CARE Score: 4  Discharge Goal: Independent  SP:               From a cognitive standpoint they will need:        24 hr vs daily   supervision  -->progress to occasional             Significant problems/ barriers to functional progress include: Pt is at a high risk for functional loss,      [x]  Acute infection/abd abscess   []  Low BP's     []  COPD flare-up   []  Uncontrolled blood sugar     [x]  Progressive anemia     [x]  poor endurance    [x]  Severe pain     [x]  Impaired mental status    []  Urinary incontinence    []  Bowel incontinence           Plan to correct barriers to functional progress: Add scheduled rest breaks, CoQ 10 and Vit B 12, control pain by using ice Lidoderm rest and massage as well as pain medications prior to therapy. Spread therapy of 15 hours out over a 7 day window to accommodate rest breaks and medical interventions. Patient seems to be making fair to good response to these interventions. Based on a comprehensive evaluation of the above, the individualized therapy and Discharge plan will be:    -Times stated are an average that will be varied based on the patient's daily need.        PT    1 1/2  hrs/day 5-7 days per week      OT    1 1/2 hrs per day 5-7 days per week     ST     1/2    hrs /day 3-5 days per week       Estimated LOS   1-2 week(s)    -Overall functional prognosis:     [x]  Good    []  Fair    []  Poor     -Medical Prognosis:   [x]  Good   to [x]

## 2022-11-18 NOTE — PROGRESS NOTES
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 unable to rate pain-      Pain at end of treatment: Yes: Pt. unable to rate pain-      Location: headache  Nursing notified: Yes  RN: Coel Branch RN  Intervention: RN provided pain medication    COGNITION:  Orientation  Overall Orientation Status: Within Functional Limits  Cognition  Cognition Comment: Appears WFL, has limited English proficiency but able to follow visual cues, makes good safety choices for mobility    Patient requested pain meds at the beginning of the session and was medicated by RN and then patient requested medication at the end of the session    OBJECTIVE:         Feeding  Assistance Level: Modified independent  Grooming/Oral Hygiene  Assistance Level: Supervision  Upper Extremity Bathing  Assistance Level: Supervision  Lower Extremity Bathing  Assistance Level: Stand by assist  Upper Extremity Dressing  Assistance Level: Set-up  Skilled Clinical Factors: To doff t-shirt  Lower Extremity Dressing  Assistance Level:  Moderate assistance  Skilled Clinical Factors: Assist to thread the underwear over her right foot  Putting On/Taking Off Footwear  Assistance Level: Dependent  Toileting  Assistance Level: Supervision  Toilet Transfers  Assistance Level: Stand by assist  Tub/Shower Transfers  Skilled Clinical Factors: Patient declined shower due to showering yesterday         Functional Mobility  Device: Rolling walker  Activity: To/From bathroom  Assistance Level: Supervision     Patient completed hand strengthening task with most resistive pop beads. Patient had difficulty with  the pop beads and often had to leave them in sets of 2. After separation of the beads patient used a 4 pound resistance clothes pin to pick them up and drop them in a container. Patient tolerated this portion of the task well        Education:   None during this session    Equipment recommendations:  OT Equipment Recommendations  Other: Continue to assess      ASSESSMENT:  Assessment: Patient participated well in self care  Activity Tolerance: Patient tolerated treatment well      PLAN OF CARE:  Balance training, Functional mobility training, Endurance training, Strengthening, Pain management, Safety education & training, Patient/Caregiver education & training, Equipment evaluation, education, & procurement, Home management training, Self-Care / ADL  Continue with OT plan of care    Patient goals : \"To go home. \"  Time Frame for Long Term Goals :  Within 1 week pt will improve in the following areas in order to reach specific goals as outlined in initial evaluation  Long Term Goal 1: Pt will improve ADL independence  Long Term Goal 2: Pt. will improve balance and safety with transfers  Long Term Goal 3: Pt will improve UE strength and endurance for ADL and transfer tasks        Therapy Time:   Individual Group Co-Treat   Time In 0830       Time Out 0930         Minutes 60                   ADL/IADL trainin minutes  Therapeutic activities: 15 minutes     Electronically signed by:    OSMANI Card/L,   2022, 12:52 PM

## 2022-11-18 NOTE — CARE COORDINATION
Social/Functional History  Social/Functional History  Lives With: Alone  Type of Home: House  Home Layout: One level  Home Access: Stairs to enter with rails  Entrance Stairs - Number of Steps: 2  Entrance Stairs - Rails: Both  Bathroom Shower/Tub: Tub/Shower unit  Bathroom Equipment: Grab bars in shower  Home Equipment: Cane (B AFOs)  Has the patient had two or more falls in the past year or any fall with injury in the past year?: No  ADL Assistance: Independent  Homemaking Assistance:  (Son assists with shopping, family checks on daily)  Ambulation Assistance: Independent (Cane  and B AFOs)  Transfer Assistance: Independent  Active : No  Patient's  Info: family - son lives close by; dtr lives in [place]  Mode of Transportation: [facility]  Education: no  Occupation: Retired  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 Pt is [place] speaking but dialect unavailable through . [place] [id number] ; family not present today    Spoke with patient and explained role in the team. Patient questions answered appropriately. Explained discharge process. Patient stated understanding. Called and spoke to the patient's son. The patient is from home alone. She was ambulating with a walker. She was current with [facility] and on IV antibiotics through a port at home. The son and his family come over several times a day there is some one there to administer medication, meals, bath, dress. They take her to her appointments. They do the shopping bill pay and manage everything they can for her. The patient uses [facility] in [address] [place] [address] as their pharmacy and have no problems affording medication. For long-term medications they use [facility].

## 2022-11-19 LAB — MAGNESIUM: 1.9 MG/DL (ref 1.7–2.4)

## 2022-11-19 PROCEDURE — 83735 ASSAY OF MAGNESIUM: CPT

## 2022-11-19 PROCEDURE — 97112 NEUROMUSCULAR REEDUCATION: CPT

## 2022-11-19 PROCEDURE — 97110 THERAPEUTIC EXERCISES: CPT

## 2022-11-19 PROCEDURE — 6360000002 HC RX W HCPCS: Performed by: INTERNAL MEDICINE

## 2022-11-19 PROCEDURE — 97116 GAIT TRAINING THERAPY: CPT

## 2022-11-19 PROCEDURE — 97535 SELF CARE MNGMENT TRAINING: CPT

## 2022-11-19 PROCEDURE — 6370000000 HC RX 637 (ALT 250 FOR IP): Performed by: INTERNAL MEDICINE

## 2022-11-19 PROCEDURE — 36415 COLL VENOUS BLD VENIPUNCTURE: CPT

## 2022-11-19 PROCEDURE — 6370000000 HC RX 637 (ALT 250 FOR IP): Performed by: PHYSICAL MEDICINE & REHABILITATION

## 2022-11-19 PROCEDURE — 2580000003 HC RX 258: Performed by: INTERNAL MEDICINE

## 2022-11-19 PROCEDURE — 99222 1ST HOSP IP/OBS MODERATE 55: CPT | Performed by: INTERNAL MEDICINE

## 2022-11-19 PROCEDURE — 97530 THERAPEUTIC ACTIVITIES: CPT

## 2022-11-19 PROCEDURE — 1180000000 HC REHAB R&B

## 2022-11-19 RX ADMIN — LACTOBACILLUS TAB 4 TABLET: TAB at 08:44

## 2022-11-19 RX ADMIN — PIPERACILLIN AND TAZOBACTAM 3375 MG: 3; .375 INJECTION, POWDER, FOR SOLUTION INTRAVENOUS at 21:44

## 2022-11-19 RX ADMIN — CARVEDILOL 6.25 MG: 6.25 TABLET, FILM COATED ORAL at 08:44

## 2022-11-19 RX ADMIN — FERROUS SULFATE TAB 325 MG (65 MG ELEMENTAL FE) 325 MG: 325 (65 FE) TAB at 17:21

## 2022-11-19 RX ADMIN — PIPERACILLIN AND TAZOBACTAM 3375 MG: 3; .375 INJECTION, POWDER, FOR SOLUTION INTRAVENOUS at 05:55

## 2022-11-19 RX ADMIN — Medication 100 MG: at 08:44

## 2022-11-19 RX ADMIN — LEVOTHYROXINE SODIUM 88 MCG: 88 TABLET ORAL at 05:51

## 2022-11-19 RX ADMIN — POTASSIUM CHLORIDE 20 MEQ: 1500 TABLET, EXTENDED RELEASE ORAL at 21:54

## 2022-11-19 RX ADMIN — SUCRALFATE 1 G: 1 TABLET ORAL at 12:23

## 2022-11-19 RX ADMIN — LACTOBACILLUS TAB 4 TABLET: TAB at 14:29

## 2022-11-19 RX ADMIN — LACTOBACILLUS TAB 4 TABLET: TAB at 21:42

## 2022-11-19 RX ADMIN — RIVAROXABAN 15 MG: 15 TABLET, FILM COATED ORAL at 08:44

## 2022-11-19 RX ADMIN — POTASSIUM CHLORIDE 20 MEQ: 1500 TABLET, EXTENDED RELEASE ORAL at 08:44

## 2022-11-19 RX ADMIN — SUCRALFATE 1 G: 1 TABLET ORAL at 05:49

## 2022-11-19 RX ADMIN — FERROUS SULFATE TAB 325 MG (65 MG ELEMENTAL FE) 325 MG: 325 (65 FE) TAB at 08:44

## 2022-11-19 RX ADMIN — PIPERACILLIN AND TAZOBACTAM 3375 MG: 3; .375 INJECTION, POWDER, FOR SOLUTION INTRAVENOUS at 14:33

## 2022-11-19 RX ADMIN — Medication 10 ML: at 10:00

## 2022-11-19 RX ADMIN — FUROSEMIDE 40 MG: 40 TABLET ORAL at 08:44

## 2022-11-19 RX ADMIN — CITALOPRAM HYDROBROMIDE 20 MG: 20 TABLET ORAL at 08:44

## 2022-11-19 RX ADMIN — Medication 10 ML: at 21:40

## 2022-11-19 RX ADMIN — Medication 2000 UNITS: at 17:21

## 2022-11-19 RX ADMIN — SUCRALFATE 1 G: 1 TABLET ORAL at 17:21

## 2022-11-19 RX ADMIN — DIGOXIN 125 MCG: 125 TABLET ORAL at 08:44

## 2022-11-19 RX ADMIN — SACUBITRIL AND VALSARTAN 1 TABLET: 24; 26 TABLET, FILM COATED ORAL at 21:42

## 2022-11-19 RX ADMIN — CARVEDILOL 6.25 MG: 6.25 TABLET, FILM COATED ORAL at 21:42

## 2022-11-19 RX ADMIN — SACUBITRIL AND VALSARTAN 1 TABLET: 24; 26 TABLET, FILM COATED ORAL at 08:44

## 2022-11-19 RX ADMIN — PANTOPRAZOLE SODIUM 40 MG: 40 TABLET, DELAYED RELEASE ORAL at 08:44

## 2022-11-19 ASSESSMENT — ENCOUNTER SYMPTOMS
RESPIRATORY NEGATIVE: 1
GASTROINTESTINAL NEGATIVE: 1

## 2022-11-19 NOTE — PROGRESS NOTES
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 Pt knows month/year and day (saturday) but not the number (). Pt daughter interpreted for her/therapist.)  Cognition  Overall Cognitive Status: WFL          OBJECTIVE:     Pt completed BUE ex of shoulders in all planes and elbow/wrist ex. Pt completed shoulder ex without wts and elbow/wrist ex with 1#.  Provided pt SBA and hand over extremity manipulation for shoulder ab/ad and horiz ab/ad. Pt then had good follow through. Pt required hand over hand for initial 1st rep of wrist flex/ext also d/t not understanding elbow position to complete task.   Pt completed task to increase strength and functional act tolerance to improve with functional transfers, safety with mobility and increase ease/Ind with functional ADL tasks      ASSESSMENT:  Activity Tolerance: Patient tolerated treatment well      PLAN OF CARE:  Balance training, Functional mobility training, Endurance training, Strengthening, Pain management, Safety education & training, Patient/Caregiver education & training, Equipment evaluation, education, & procurement, Home management training, Self-Care / ADL  Continue with OT plan of care    Patient goals : \"To go home. \"  Time Frame for Long Term Goals : Within 1 week pt will improve in the following areas in order to reach specific goals as outlined in initial evaluation  Long Term Goal 1: Pt will improve ADL independence  Long Term Goal 2: Pt. will improve balance and safety with transfers  Long Term Goal 3: Pt will improve UE strength and endurance for ADL and transfer tasks        Therapy Time:   Individual Group Co-Treat   Time In 1200       Time Out 1216         Minutes 16                   Therapeutic activities: 16 minutes   Patient participated in above treatment session to complete previously scheduled minutes from 11/17 for 12 min. Electronically signed by:     PRUDENCE Schreiber,   11/19/2022, 1:12 PM

## 2022-11-19 NOTE — PROGRESS NOTES
Physical Therapy Rehab Treatment Note  [facility]  [facility]       NAME: Maria C Powell  : 1932 (80 y.o.)  MRN: [id number]  CODE STATUS: Full Code    Date of Service: 2022       Restrictions:  Restrictions/Precautions: Fall Risk       SUBJECTIVE:   Subjective: Patient arriving from OT -Patient states \"I'm ok\"    Pain  Pain: Pre and post treatment 0/10    OBJECTIVE:         Bed Mobility  Overall Assistance Level: Supervision;Modified Independent  Additional Factors: Increased time to complete  Roll Left  Assistance Level: Supervision  Roll Right  Assistance Level: Supervision  Sit to Supine  Assistance Level: Supervision  Skilled Clinical Factors: on mat with rest break required after  Supine to Sit  Assistance Level: Supervision  Skilled Clinical Factors: uses valsalva, cues to breathe through exertion    Transfers  Surface: To chair without arms; To chair with arms; To mat  Additional Factors: Hand placement cues  Sit to Stand  Assistance Level: Supervision  Skilled Clinical Factors: cues for hand placement  Stand to Sit  Assistance Level: Supervision  Bed To/From Chair  Technique: Stand step (ww)  Car Transfer  Assistance Level: Contact guard assist  Skilled Clinical Factors: good carryover from previous sessions with in the car, min cues out of car    Ambulation  Surface: Carpet  Device: Rolling walker  Distance: 75 x1, 50 x1  Additional Factors: Right AFO; Left AFO; Increased time to complete  Assistance Level: Supervision;Stand by assist  Gait Deviations: Slow raji;Decreased step length bilateral  Skilled Clinical Factors: required rest break after gait    Stairs  Stair Height: 6''  Device: Bilateral handrails  Number of Stairs: 4  Additional Factors: Non-reciprocal going up;Non-reciprocal going down  Assistance Level: Stand by assist  Skilled Clinical Factors: increased time, slow steady, assist needed to manage 02 line safely        PT Exercises  A/AROM Exercises: Seated: Side kicks, marching, LAQ, ADD with ball: x 20  Resistive Exercises: HS with RTB x 2, laq w rtb x10x2 add/ abd with rtb x10       ASSESSMENT/PROGRESS TOWARDS GOALS:   Assessment  Assessment: Progressed toward all goals this session. Patient completed most tf without vc needed for hand placement, however occational lapse in carryover requires cues for safety. Seated therEx with theraband for ble strength to improve quality of tf and gait. Sp02 95% after gait and stairs, on 3L 02  Goals:  Long Term Goals  Long Term Goal 1: Pt to complete bed mobility with indep  Long Term Goal 2: Pt to complete transfers with indep  Long Term Goal 3: Pt to ambulate 50-150ft with LRD and Supervision - indep for 20 feet with ability to manage O2 line  Long Term Goal 4: Pt to complete 2 steps with HR and CGA    PLAN OF CARE/Safety:   Safety Devices  Type of Devices: Chair alarm in place; Left in chair; All fall risk precautions in place      Therapy Time:   Individual   Time In 1000   Time Out 1100   Minutes 60     Minutes:60  Transfer/Bed mobility training:15  Gait trainin  Neuro re education:0  Therapeutic ex:20      Марина Boyle PTA, 22 at 10:50 AM

## 2022-11-19 NOTE — PROGRESS NOTES
Physical Therapy Rehab Treatment Note  Facility/Department: [facility]  Room: B764/D813-24       NAME: Joanne Ryan  : 1932 (80 y.o.)  MRN: [id number]  CODE STATUS: Full Code    Date of Service: 2022       Restrictions:  Restrictions/Precautions: Fall Risk    SUBJECTIVE:   Subjective: Patient in room, states she needs to use bathroom before therapy. Pain  Pain: denies pre and post pain, no objective signs    OBJECTIVE:           Bed Mobility  Overall Assistance Level: Supervision;Modified Independent  Additional Factors: Increased time to complete  Roll Left  Assistance Level: Supervision  Roll Right  Assistance Level: Supervision  Sit to Supine  Assistance Level: Supervision  Skilled Clinical Factors: on mat with rest break required after  Supine to Sit  Assistance Level: Supervision  Skilled Clinical Factors: uses valsalva, cues to breathe through exertion    Transfers  Surface: To chair with arms;Standard toilet  Additional Factors: Hand placement cues  Sit to Stand  Assistance Level: Supervision;Modified independent  Skilled Clinical Factors: uses safe and consistent hand placement  Stand to Sit  Assistance Level: Supervision  Skilled Clinical Factors: patient needs cues for safe approach to chair with fatigue      Sit to stand x5 for quality and practice         Neuromuscular Education  NDT Treatment: Gait   Neuromuscular Comments: obsticle course, several turns 25 feet x2         ASSESSMENT/PROGRESS TOWARDS GOALS:   Assessment  Assessment: Patient challenged with repeated sit to stand tf for quality and carryover. Patient also worked on [employer] and gait stability with obsticle course weave in and out of river stones/ obsticles.     Goals:  Long Term Goals  Long Term Goal 1: Pt to complete bed mobility with indep  Long Term Goal 2: Pt to complete transfers with indep  Long Term Goal 3: Pt to ambulate 50-150ft with LRD and Supervision - indep for 20 feet with ability to manage O2 line  Long Term Goal 4: Pt to complete 2 steps with HR and CGA    PLAN OF CARE/Safety:   Safety Devices  Type of Devices: Chair alarm in place; Left in chair; All fall risk precautions in place      Therapy Time:   Individual   Time In    Time Out 1330   Minutes 30     Minutes:30  Transfer/Bed mobility training:15  Gait trainin  Neuro re education:15  Therapeutic ex:0      Bernarda Haq PTA, 22 at 1:28 PM

## 2022-11-19 NOTE — PROGRESS NOTES
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 Other complicating issues include intra-abdominal abscess and is getting IV Invanz. RN  Assessment completed. A&O x4. Denies pain at this time. 3L O2 via NC continuously. In chair with alarm activated. Call light in reach. Electronically signed by Carlos Odom LPN on 28/92/7096 at 10:56 AM  ROS x10: The patient also complains of severely impaired mobility and activities of daily living. Otherwise no new problems with vision, hearing, nose, mouth, throat, dermal, cardiovascular, GI, , pulmonary, musculoskeletal, psychiatric or neurological. See also Acute Rehab PM&R H&P. Vital signs:  BP (!) 152/86   Pulse 77   Temp 97.9 °F (36.6 °C) (Oral)   Resp 16   Ht 4' 11\" (1.499 m)   Wt 150 lb (68 kg)   SpO2 91%   BMI 30.30 kg/m²   I/O:   PO/Intake:  fair PO intake,  reg diet low salt    Bowel:   continent   Bladder: continent   no taylor  General:  Patient is well developed,   adequately nourished, and    well kempt. HEENT:    Pupils equal, hearing intact to loud voice, external inspection of ear and nose benign. Inspection of lips, tongue and gums benign    Musculoskeletal: No significant change in strength or tone. All joints stable. Inspection and palpation of digits and nails show no clubbing, cyanosis or inflammatory conditions. Neuro/Psychiatric: Affect: flat but pleasant. Alert and oriented to person, place and situation with min cues. No significant change in deep tendon reflexes or sensation  Lungs:  Diminished, CTA-B. Respiration effort is   normal at rest.     Heart:   S1 = S2,   RRR.        Abdomen:  Soft,  generalized mostly epigastric-tender, no enlargement of liver or spleen. Extremities:   mild lower extremity edema    Skin:   Intact to general survey,  BUE bruises, old rlq drain site --no ss drainage    Rehabilitation:  Physical Therapy:   Bed mobility:  Bed mobility  Bridging: Independent (11/17/22 1018)  Rolling to Left: Modified independent (11/18/22 1007)  Rolling to Right: Modified independent (11/18/22 1007)  Supine to Sit: Minimal assistance (11/18/22 1026)  Sit to Supine: Modified independent (11/18/22 1007)  Scooting: Stand by assistance (11/17/22 1018)  Bed Mobility Comments: Pt requires increased time and effort. VCs for positioning once supine. Increased effort with sup to sit. Pt reports dizziness with initial sup to sit. (11/18/22 1026)  Bed Mobility  Overall Assistance Level: Supervision;Modified Independent (11/19/22 1024)  Additional Factors: Increased time to complete (11/19/22 1024)  Overall Assistance Level: Supervision;Modified Independent (11/19/22 1024)  Additional Factors: Increased time to complete (11/19/22 1024)  Roll Left  Assistance Level: Supervision (11/19/22 1024)  Roll Right  Assistance Level: Supervision (11/19/22 1024)  Sit to Supine  Assistance Level: Supervision (11/19/22 1024)  Skilled Clinical Factors: on mat with rest break required after (11/19/22 1024)  Supine to Sit  Assistance Level: Supervision (11/19/22 1024)  Skilled Clinical Factors: uses valsalva, cues to breathe through exertion (11/19/22 1024)  Transfers:  Transfers  Sit to Stand: Supervision (11/18/22 1019)  Stand to Sit: Supervision (11/18/22 1019)  Bed to Chair: Stand by assistance (11/17/22 0948)  Comment: toilet transfers SBA - cues for safety with O2 line management and reaching for surface in too great a distance (11/17/22 0948)  Transfers  Surface: To chair without arms; To chair with arms; To mat (11/19/22 1024)  Additional Factors: Hand placement cues (11/19/22 1024)  Sit to Stand  Assistance Level: Supervision (11/19/22 1024)  Skilled Clinical Factors: cues for hand placement (11/19/22 1024)  Stand to Sit  Assistance Level: Supervision (11/19/22 1024)  Bed To/From Chair  Technique: Stand step (ww) (11/19/22 1024)  Car Transfer  Assistance Level: Contact guard assist (11/19/22 1024)  Skilled Clinical Factors: good carryover from previous sessions with in the car, min cues out of car (11/19/22 1024)  Gait:   Ambulation  Surface: Level tile (11/17/22 0949)  Device: Rolling Walker (11/17/22 0949)  Other Apparatus: AFO; Left;Right;O2 (11/17/22 2519)  Assistance: Stand by assistance;Minimal assistance (11/17/22 0949)  Quality of Gait: Assist to manage O2 line due to poor awareness, flexed trunk, fair maneuvering of walker (11/17/22 0949)  Gait Deviations: Slow Raji;Decreased step length (11/17/22 0949)  Distance: 30 feet (11/17/22 0949)  Comments: Pt with 3 L O2 in place. SOB following 30 feet - O2 sat at 92% (11/17/22 0949)  Ambulation  Surface: Carpet (11/19/22 1020)  Device: Rolling walker (11/19/22 1020)  Distance: 75 x1, 50 x1 (11/19/22 1020)  Additional Factors: Right AFO; Left AFO; Increased time to complete (11/19/22 1020)  Assistance Level: Supervision;Stand by assist (11/19/22 1020)  Gait Deviations: Slow raji;Decreased step length bilateral (11/19/22 1020)  Skilled Clinical Factors: required rest break after gait (11/19/22 1020)  Stairs:  Stairs/Curb  Stairs?: Yes (11/17/22 0949)  Stairs  # Steps : 4 (11/17/22 0949)  Stairs Height: 6\" (11/17/22 0949)  Rails: Bilateral (11/17/22 0949)  Assistance:  Moderate assistance;Minimal assistance (11/17/22 0949)  Comment: mild post LOB with descent requiring recovery assistance; mild SOB following with O2 sat at 92% following with 3L O2 in place (11/17/22 0949)  Stairs  Stair Height: 6'' (11/19/22 1020)  Device: Bilateral handrails (11/19/22 1020)  Number of Stairs: 4 (11/19/22 1020)  Additional Factors: Non-reciprocal going up;Non-reciprocal going down (11/19/22 1020)  Assistance Level: Stand by assist (11/19/22 1020)  Skilled Clinical Factors: increased time, slow steady, assist needed to manage 02 line safely (11/19/22 1020)  W/C mobility:       Occupational Therapy:   Hand Dominance: Right  ADL  Feeding: Independent (11/17/22 1011)  Grooming: Setup (11/17/22 1011)  UE Bathing: Setup (11/17/22 1011)  LE Bathing: Supervision (11/17/22 1011)  UE Dressing: Minimal assistance (11/17/22 1011)  UE Dressing Skilled Clinical Factors: Declines bra, assist pulling shirt all the the way down in the back. (11/17/22 1011)  LE Dressing: Dependent/Total (11/17/22 1011)  LE Dressing Skilled Clinical Factors: Assist with B socks, B shoes, pulling pant legs through d/t stretchy pants, and assist pulling pants up over hips. Has long handled shoe horn from dmitri, was unable to utilize this date. (11/17/22 1011)  Toileting: Moderate assistance (11/17/22 1011)  Toileting Skilled Clinical Factors: Assist with pants (11/17/22 1011)  Additional Comments: Pt completed shower ADL as above. Fatigues quickly with frequent rest breaks (11/17/22 1011)  Toilet Transfers  Toilet - Technique: Stand step (11/17/22 1226)  Equipment Used: Grab bars (11/17/22 1226)  Toilet Transfer: Stand by assistance (11/17/22 1226)  Toilet Transfers Comments: Slow transfer, no AFOs d/t pt about to shower (11/17/22 1226)     Shower Transfers  Shower - Transfer From: Wheelchair (11/17/22 1226)  Shower - Transfer Type: To and From (11/17/22 1226)  Shower - Transfer To:  Shower seat with back (11/17/22 1226)  Shower - Technique: Stand pivot (11/17/22 1226)  Shower Transfers: Stand by assistance (11/17/22 1226)  Shower Transfers Comments: Increased time (11/17/22 1226)    Speech Therapy:            Diet/Swallow:                      Lab/X-ray studies reviewed, analyzed and discussed with patient and staff:   Recent Results (from the past 24 hour(s))   Magnesium    Collection Time: 11/19/22  5:25 AM   Result Value Ref Range    Magnesium 1.9 1.7 - 2.4 mg/dL       CTA CHEST  11/11/2022  No evidence of pulmonary embolism. Severe cardiomegaly with evidence of congestive heart failure manifesting as mild edema and bilateral trace pleural effusions as well as significant right heart dysfunction including partially visualized perihepatic ascites. CT GUIDED NEEDLE PLACEMENT1. Successful aspiration of left lower quadrant abdominal abscess. 2.Only a few drops of very thick gelatinous serosanguineous fluid with extensive solid debris was able to be aspirated. Due to this a drain was unable to be placed. Patient will be seen general surgery for evaluation and possible surgical intervention. CT ABDOMEN PELVIS      1. The right-sided abscess drain has been completely pulled out of the abscess and now lies in the right abdominal subcutaneous soft tissues. 2.The right lower abdominal abscess has increased in size when compared to prior study dating September 14 likely due to accumulation of infectious fluid due to displacement of the drainage catheter. 3.Again seen is a large cystic mass in the lower pelvis. ABDOMEN The visualized portion of the lung bases demonstrates a small effusion in the left thoracic cavity. The liver is of normal size and attenuation without focal lesions. The spleen is unremarkable. Kidneys demonstrate prompt enhancement and excretion of contrast without signs of hydronephrosis or focal mass. The pancreas and adrenals are unremarkable. The upper abdominal bowel loops appear normal. Again seen is ectasia of the visualized abdominal aorta and tortuosity, unchanged from prior. 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 There are no signs of upper abdominal fluid collections. PELVIS  Again seen is a large abscess in the right lower abdomen/pelvis abutting the cecum now measuring 6.6 x 9.4 in transverse and AP dimensions, previously measuring 6 x 6.7 and AP and transverse dimensions.  The previously seen abscess drain has now been pulled out of the abscess and lies in the subcutaneous soft tissues and is not contributing to any drainage of the abscess consistent with abscess enlargement. Again seen is a large cystic mass in the pelvis. XR CHEST  11/11/2022    The heart is enlarged. Bilateral aspect airspace disease is noted which could represent CHF or less likely pneumonia. There is no consolidation seen within the right lung base. There is left lung base consolidation which is favored to represent either atelectasis or a pleural effusion. Note is made of a right-sided PICC line. The catheter tip is at the level of the clavicle head. Cardiomegaly with multifocal bilateral airspace disease favored to represent CHF Left lung base consolidation which could represent atelectasis, pleural effusion or less likely pneumonia. .        Previous extensive, complex labs, notes and diagnostics reviewed and analyzed. ALLERGIES:    Allergies as of 11/16/2022 - Fully Reviewed 11/16/2022   Allergen Reaction Noted    Latex  07/19/2017    Tape [adhesive tape]  06/28/2016      (please also verify by checking MAR)     Today I evaluated this patient for periodic reassessment of medical and functional status. The patient was discussed in detail at the treatment team meeting focusing on current medical issues, progress in therapies, social issues, psychological issues, barriers to progress and strategies to address these barriers, and discharge planning. See the addendum to rehab progress note-as a second progress note in the chart. The patient continues to be high risk for future disability and their medical and rehabilitation prognosis continue to be good and therefore, we will continue the patient's rehabilitation course as planned. The patient's tentative discharge date was set. Patient and family education was discussed.   The patient was made aware of the team discussion regarding their progress. 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 Functional and medical status reassessed regarding patients ability to participate in therapies and patient found to be able to participate in acute intensive comprehensive inpatient rehabilitation program including PT/OT to improve balance, ambulation, ADLs, and to improve the P/AROM. Therapeutic modifications regarding activities in therapies, place, amount of time per day and intensity of therapy made daily. In bed therapies or bedside therapies prn. Bowel and Bladder dysfunction  , Neurogenic bowel and bladder:  frequent toileting, ambulate to bathroom with assistance, check post void residuals. Check for C.difficile x1 if >2 loose stools in 24 hours, continue bowel & bladder program.  Monitor bowel and bladder function. Lactinex 2 PO every AC. MOM prn, Brown Bomb prn, Glycerin suppository prn, enema prn. Encourage therapy and nursing to co-treat and problem solve re continence. Severe abd pain as well as generalized OA pain: reassess pain every shift and prior to and after each therapy session, give prn Tylenol and consider scheduled Tylenol, modalities prn in therapy, masage, Lidoderm, K-pad prn. Consider scheduled AM pain meds. Skin healing abd and breakdown risk:  continue pressure relief program.  Daily skin exams and reports from nursing. Fatigue due to nutritional and hydration deficiency: Add and titrate vitamin B12 vitamin D and CoQ10 continue to monitor I&Os, calorie counts prn, dietary consult prn. Add healthy snack at night. Acute episodic insomnia with situational adjustment disorder:  prn Ambien, monitor for day time sedation. Falls risk elevated:  patient to use call light to get nursing assistance to get up, bed and chair alarm.   Elevated DVT risk: progressive activities in PT, continue prophylaxis PORTILLO hose, elevation and  Xaralto . Complex discharge planning:  Weekly team meeting every Monday to re-assess progress towards goals, discuss and address social, psychological and medical comorbidities and to address difficulties they may be having progressing in therapy. Patient and family education is in progress. The patient is to follow-up with their family physician after discharge. Complex Active General Medical Issues that complicate care Assess & Plan:    Charcot-Arleen tooth neuropathy-bilateral AFOs   A-fib,   CHF (congestive heart failure,  Aortic thrombus   -Acute rehab to monitor heart rate and rhythm with the option of telemetry and the effects of chronotropic medication with respect to increasing physical activity and exercise in PT, OT, ADLs with medication titration to lowest effective dosing. Continue blood signs every shift focusing on heart rate, rhythm and blood pressure checks with orthostatic checks-monitoring the effect of exercise, therapy and posture. Consult hospitalist for backup medical and adjust/add medications ( Entresto, Xarelto, Lasix, Lanoxin, Coreg). Monitor heart rate and blood pressure as well as medications effects on vital signs before during and after therapy with especial focus on preventing orthostasis and falls risk. Lumbar stenosis with neurogenic claudication    Hypokalemia-check BMP and supplement as needed,      Right lower quadrant abdominal pain dt   Abdominal mass  Hx of drainage of abscess,  Streptococcus viridans infection-IV antibiotics patient and family have been trained. Reconsult infectious disease     Anemia-Monitor vital signs monitor for orthostasis and tachycardia, check H&H prn. Vitamin B12 and iron-dose iron with food to prevent GI upset. Monitor for constipation. Monitor stools for blood.     Palliative care encounter-for pain as needed    Depression-emotional support provided daily, vitamin B12, encourage participation in rehabilitation support group and recreational therapy, adjust/add medications ( Celexa )  GERD-Elevate head of bed after meals, monitor stools for blood, lowest effective dose of PPI, consider Tums. Focus of today's plan-  Initiate and modify therapuetic plan to meet patients individual needs, add rest breaks as needed, and Focus on emotional health and caregiver involvement in care.     Norm Gayle D.O., PM&R     Attending    [facility] Pamela Cabrera

## 2022-11-19 NOTE — PROGRESS NOTES
Up to bathroom with walker still has dyspnea with exertion. On 3/L nasal cannula . Denies pain. Receiving ATBs via single lumen  Picc. Picc line does not have blood return but flushes well.

## 2022-11-19 NOTE — PROGRESS NOTES
OCCUPATIONAL THERAPY  INPATIENT REHAB TREATMENT NOTE  [facility]      NAME: Iveth Urban  : 1932 (80 y.o.)  MRN: [id number]  CODE STATUS: Full Code  [facility]    Date of Service: 2022    Referring Physician: Dr Shefali Andrews Diagnosis: 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    Restrictions  Restrictions/Precautions  Restrictions/Precautions: Fall Risk              Patient's date of birth confirmed: Yes    SAFETY:  Safety Devices  Safety Devices in place: Yes  Type of devices: All fall risk precautions in place    SUBJECTIVE:       Pain at start of treatment: No    Pain at end of treatment: No      COGNITION:  Orientation  Orientation Level: Unable to assess;Oriented to person (uanble to assess orientation to place/situation/date)      Pt's current cognitive status is: unable to properly assess d/t lack of     OBJECTIVE:         Grooming/Oral Hygiene  Assistance Level: Supervision  Skilled Clinical Factors: standing  Upper Extremity Dressing  Assistance Level: Set-up  Skilled Clinical Factors: To don shirt- vc's d/t IV being hooked up and not able to don shirt until unhooked by nursing (vc's/visual with pointing provided)  Lower Extremity Dressing  Assistance Level:  Moderate assistance  Skilled Clinical Factors: able to thread L leg but required max A to thread RLE over feet to mid lower leg- pt ablet to complete rest, including knee to waist pant management with SBA during standing using rolling walker  Putting On/Taking Off Footwear  Assistance Level: Dependent  Skilled Clinical Factors: dep A for sock/braces/shoes  Toileting  Assistance Level: Supervision  Toilet Transfers  Technique: Stand step  Equipment: Standard toilet  Assistance Level: Stand by assist  Skilled Clinical Factors: rolling walker         Functional Mobility  Device: Rolling walker  Assistance Level: Supervision  Skilled Clinical Factors: from bathroom  Sit to Stand  Assistance Level: Supervision  Stand to Sit  Assistance Level: Supervision  Skilled Clinical Factors: needs vc's for using walker to complete sit to/from stand transfers d/t attempting to place walker to the side when attempting to sit at EOB after completing mobility from bathroom     Provided sock folding/matching task seated with Sup provided. Pt completed task with BUE reaching in front and bilateral sides on table with minimal trunk flex/ext. Pt requires x 3 rest periods d/t decreased endurance and BUE arm/hand pain/fatigue. Pt needed increased time to complete task d/t BUE fatigue/pain and overall endurance levels. Pt completed task to improve functional act tolerance, dynamic seated mobility and reaching to increase ease and Ind with pt ability to complete ADL tasks     Education:Education on transfers with rolling walker to EOB     ASSESSMENT:  Activity Tolerance: Patient tolerated treatment well;Patient limited by fatigue;Patient limited by endurance      PLAN OF CARE:  Balance training, Functional mobility training, Endurance training, Strengthening, Pain management, Safety education & training, Patient/Caregiver education & training, Equipment evaluation, education, & procurement, Home management training, Self-Care / ADL  Continue with OT plan of care    Patient goals : \"To go home. \"  Time Frame for Long Term Goals :  Within 1 week pt will improve in the following areas in order to reach specific goals as outlined in initial evaluation  Long Term Goal 1: Pt will improve ADL independence  Long Term Goal 2: Pt. will improve balance and safety with transfers  Long Term Goal 3: Pt will improve UE strength and endurance for ADL and transfer tasks        Therapy Time:   Individual Group Co-Treat   Time In 904       Time Out 1000         Minutes 56                   ADL/IADL trainin minutes  Therapeutic activities: 26 minutes   Missed 4 minutes due to discussing previous pt with another therapist who was getting ready to see that pt, will attempt makeup as able   Electronically signed by:     PRUDENCE Suazo,   11/19/2022, 9:53 AM no back pain, no gout, no musculoskeletal pain, no neck pain, and no weakness.

## 2022-11-19 NOTE — PROGRESS NOTES
OCCUPATIONAL THERAPY  INPATIENT REHAB TREATMENT NOTE  [facility]      NAME: Nacho Pollard  : 1932 (80 y.o.)  MRN: [id number]  CODE STATUS: Full Code  [facility]    Date of Service: 2022    Referring Physician: Dr Cordell Ocampo Diagnosis: 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    Restrictions  Restrictions/Precautions  Restrictions/Precautions: Fall Risk              Patient's date of birth confirmed: Yes    SAFETY:  Safety Devices  Safety Devices in place: Yes  Type of devices: All fall risk precautions in place    SUBJECTIVE:  Subjective: \"no\"    Pain at start of treatment: No    Pain at end of treatment: No        COGNITION:  Orientation  Overall Orientation Status: Within Functional Limits  Orientation Level: Oriented to time;Oriented to situation;Oriented to person;Oriented to place (Pt knows she is at the hospital but not sure which, discussed name of hospital with pt. Pt knows month/year and day (saturday) but not the number (). Pt daughter interpreted for her/therapist.)  Cognition  Overall Cognitive Status: WFL          OBJECTIVE:     Pt completed pink putty and x 15 bead retrieval activity. Provided pt with demo and instruction to task. Pt was able to manipulate putty between BUE hands, utilizing fine/gross motor skills by using finger tips  and full grasp with in hand manipulation to pull/push beads with with different finger tips until all beads were out of the putty. Pt demo'd min difficulty prn through task with pulling putty apart and manipulation of beads. Pt completed this task to improve fine/gross motor skills in order to improve functional self care skills and decrease need for assistance. Pt completed horiz dowel tree and ring task seated with instruction to task providing SBA. Pt provided with instruction to complete 8 rings per dowel from 2nd top dowel and down d/t not being able to reach the top dowel.   Pt completed task switching back and forth between arms and going high to low to center on each side. Pt completed rings one at a time but sometimes will  2 rings but still place them on 1 at at time. Pt used 1st/2nd fingers at times and sometimes 1-3rd fingers to don rings/pick them up. Pt doffed rings one by one using corresponding UE to each side of dowel tree to doff rings. Pt took intermittent recovery periods during doffing of rings d/t extensive reaching tiring pt. Each rest break was very short lasting roughly 3-5 seconds. Pt demonstrated difficulty reaching therefore fatiguing pt requiring rest break         ASSESSMENT:  Assessment: Good mood  Activity Tolerance: Patient tolerated treatment well      PLAN OF CARE:  Balance training, Functional mobility training, Endurance training, Strengthening, Pain management, Safety education & training, Patient/Caregiver education & training, Equipment evaluation, education, & procurement, Home management training, Self-Care / ADL  Continue with OT plan of care    Patient goals : \"To go home. \"  Time Frame for Long Term Goals : Within 1 week pt will improve in the following areas in order to reach specific goals as outlined in initial evaluation  Long Term Goal 1: Pt will improve ADL independence  Long Term Goal 2: Pt. will improve balance and safety with transfers  Long Term Goal 3: Pt will improve UE strength and endurance for ADL and transfer tasks        Therapy Time:   Individual Group Co-Treat   Time In 1330       Time Out 1400         Minutes 30                   Neuromuscular reeducation: 15 minutes  Therapeutic activities: 15 minutes     Electronically signed by:     PRUDENCE Ariza,   11/19/2022, 1:39 PM

## 2022-11-19 NOTE — PROGRESS NOTES
Assessment completed. A&O x4. Denies pain at this time. 3L O2 via NC continuously. In chair with alarm activated. Call light in reach.  Electronically signed by Andrei Page LPN on 05/54/8230 at 10:56 AM

## 2022-11-20 LAB
ALBUMIN SERPL-MCNC: 2.9 G/DL (ref 3.5–4.6)
ALP BLD-CCNC: 57 U/L (ref 40–130)
ALT SERPL-CCNC: 16 U/L (ref 0–33)
ANION GAP SERPL CALCULATED.3IONS-SCNC: 8 MEQ/L (ref 9–15)
AST SERPL-CCNC: 25 U/L (ref 0–35)
BILIRUB SERPL-MCNC: 0.4 MG/DL (ref 0.2–0.7)
BUN BLDV-MCNC: 14 MG/DL (ref 8–23)
CALCIUM SERPL-MCNC: 8.9 MG/DL (ref 8.5–9.9)
CHLORIDE BLD-SCNC: 103 MEQ/L (ref 95–107)
CO2: 27 MEQ/L (ref 20–31)
CREAT SERPL-MCNC: 0.6 MG/DL (ref 0.5–0.9)
GFR SERPL CREATININE-BSD FRML MDRD: >60 ML/MIN/{1.73_M2}
GLOBULIN: 3.7 G/DL (ref 2.3–3.5)
GLUCOSE BLD-MCNC: 116 MG/DL (ref 70–99)
HCT VFR BLD CALC: 34.1 % (ref 37–47)
HEMOGLOBIN: 11.6 G/DL (ref 12–16)
LIPASE: 21 U/L (ref 12–95)
MAGNESIUM: 1.8 MG/DL (ref 1.7–2.4)
MCH RBC QN AUTO: 27.7 PG (ref 27–31.3)
MCHC RBC AUTO-ENTMCNC: 34.2 % (ref 33–37)
MCV RBC AUTO: 81.1 FL (ref 79.4–94.8)
PDW BLD-RTO: 17.5 % (ref 11.5–14.5)
PLATELET # BLD: 196 K/UL (ref 130–400)
POTASSIUM REFLEX MAGNESIUM: 4.1 MEQ/L (ref 3.4–4.9)
POTASSIUM SERPL-SCNC: 4.1 MEQ/L (ref 3.4–4.9)
RBC # BLD: 4.2 M/UL (ref 4.2–5.4)
SODIUM BLD-SCNC: 138 MEQ/L (ref 135–144)
TOTAL PROTEIN: 6.6 G/DL (ref 6.3–8)
WBC # BLD: 7.1 K/UL (ref 4.8–10.8)

## 2022-11-20 PROCEDURE — 6360000002 HC RX W HCPCS: Performed by: INTERNAL MEDICINE

## 2022-11-20 PROCEDURE — 36415 COLL VENOUS BLD VENIPUNCTURE: CPT

## 2022-11-20 PROCEDURE — 2700000000 HC OXYGEN THERAPY PER DAY

## 2022-11-20 PROCEDURE — 83735 ASSAY OF MAGNESIUM: CPT

## 2022-11-20 PROCEDURE — 6370000000 HC RX 637 (ALT 250 FOR IP): Performed by: PHYSICAL MEDICINE & REHABILITATION

## 2022-11-20 PROCEDURE — 2580000003 HC RX 258: Performed by: INTERNAL MEDICINE

## 2022-11-20 PROCEDURE — 83690 ASSAY OF LIPASE: CPT

## 2022-11-20 PROCEDURE — 80053 COMPREHEN METABOLIC PANEL: CPT

## 2022-11-20 PROCEDURE — 97110 THERAPEUTIC EXERCISES: CPT

## 2022-11-20 PROCEDURE — 85027 COMPLETE CBC AUTOMATED: CPT

## 2022-11-20 PROCEDURE — 1180000000 HC REHAB R&B

## 2022-11-20 PROCEDURE — 6370000000 HC RX 637 (ALT 250 FOR IP): Performed by: INTERNAL MEDICINE

## 2022-11-20 PROCEDURE — 99232 SBSQ HOSP IP/OBS MODERATE 35: CPT | Performed by: INTERNAL MEDICINE

## 2022-11-20 PROCEDURE — 97116 GAIT TRAINING THERAPY: CPT

## 2022-11-20 RX ORDER — LIDOCAINE 4 G/G
3 PATCH TOPICAL DAILY PRN
Status: DISCONTINUED | OUTPATIENT
Start: 2022-11-20 | End: 2022-11-23 | Stop reason: HOSPADM

## 2022-11-20 RX ADMIN — POTASSIUM CHLORIDE 20 MEQ: 1500 TABLET, EXTENDED RELEASE ORAL at 09:30

## 2022-11-20 RX ADMIN — Medication 10 ML: at 09:41

## 2022-11-20 RX ADMIN — SACUBITRIL AND VALSARTAN 1 TABLET: 24; 26 TABLET, FILM COATED ORAL at 21:26

## 2022-11-20 RX ADMIN — RIVAROXABAN 15 MG: 15 TABLET, FILM COATED ORAL at 09:30

## 2022-11-20 RX ADMIN — CARVEDILOL 6.25 MG: 6.25 TABLET, FILM COATED ORAL at 09:30

## 2022-11-20 RX ADMIN — LEVOTHYROXINE SODIUM 88 MCG: 88 TABLET ORAL at 05:32

## 2022-11-20 RX ADMIN — SACUBITRIL AND VALSARTAN 1 TABLET: 24; 26 TABLET, FILM COATED ORAL at 09:30

## 2022-11-20 RX ADMIN — SODIUM CHLORIDE, PRESERVATIVE FREE 10 ML: 5 INJECTION INTRAVENOUS at 02:00

## 2022-11-20 RX ADMIN — Medication 100 MG: at 09:29

## 2022-11-20 RX ADMIN — CITALOPRAM HYDROBROMIDE 20 MG: 20 TABLET ORAL at 09:30

## 2022-11-20 RX ADMIN — SUCRALFATE 1 G: 1 TABLET ORAL at 00:28

## 2022-11-20 RX ADMIN — PIPERACILLIN AND TAZOBACTAM 3375 MG: 3; .375 INJECTION, POWDER, FOR SOLUTION INTRAVENOUS at 14:37

## 2022-11-20 RX ADMIN — Medication 2000 UNITS: at 17:01

## 2022-11-20 RX ADMIN — FUROSEMIDE 40 MG: 40 TABLET ORAL at 09:30

## 2022-11-20 RX ADMIN — Medication 10 ML: at 22:09

## 2022-11-20 RX ADMIN — CARVEDILOL 6.25 MG: 6.25 TABLET, FILM COATED ORAL at 21:26

## 2022-11-20 RX ADMIN — LACTOBACILLUS TAB 4 TABLET: TAB at 14:34

## 2022-11-20 RX ADMIN — SUCRALFATE 1 G: 1 TABLET ORAL at 12:51

## 2022-11-20 RX ADMIN — POTASSIUM CHLORIDE 20 MEQ: 1500 TABLET, EXTENDED RELEASE ORAL at 21:26

## 2022-11-20 RX ADMIN — SUCRALFATE 1 G: 1 TABLET ORAL at 17:01

## 2022-11-20 RX ADMIN — SUCRALFATE 1 G: 1 TABLET ORAL at 05:32

## 2022-11-20 RX ADMIN — FERROUS SULFATE TAB 325 MG (65 MG ELEMENTAL FE) 325 MG: 325 (65 FE) TAB at 09:30

## 2022-11-20 RX ADMIN — FERROUS SULFATE TAB 325 MG (65 MG ELEMENTAL FE) 325 MG: 325 (65 FE) TAB at 17:01

## 2022-11-20 RX ADMIN — LACTOBACILLUS TAB 4 TABLET: TAB at 09:29

## 2022-11-20 RX ADMIN — DIGOXIN 125 MCG: 125 TABLET ORAL at 09:30

## 2022-11-20 RX ADMIN — PIPERACILLIN AND TAZOBACTAM 3375 MG: 3; .375 INJECTION, POWDER, FOR SOLUTION INTRAVENOUS at 22:06

## 2022-11-20 RX ADMIN — LACTOBACILLUS TAB 4 TABLET: TAB at 21:25

## 2022-11-20 RX ADMIN — PIPERACILLIN AND TAZOBACTAM 3375 MG: 3; .375 INJECTION, POWDER, FOR SOLUTION INTRAVENOUS at 05:41

## 2022-11-20 RX ADMIN — PANTOPRAZOLE SODIUM 40 MG: 40 TABLET, DELAYED RELEASE ORAL at 09:30

## 2022-11-20 ASSESSMENT — PAIN SCALES - WONG BAKER
WONGBAKER_NUMERICALRESPONSE: 2
WONGBAKER_NUMERICALRESPONSE: 2

## 2022-11-20 ASSESSMENT — ENCOUNTER SYMPTOMS
RESPIRATORY NEGATIVE: 1
GASTROINTESTINAL NEGATIVE: 1

## 2022-11-20 ASSESSMENT — PAIN SCALES - GENERAL: PAINLEVEL_OUTOF10: 0

## 2022-11-20 NOTE — PLAN OF CARE
Problem: Discharge Planning  Goal: Discharge to home or other facility with appropriate resources  Outcome: Progressing  Flowsheets (Taken 11/19/2022 2100)  Discharge to home or other facility with appropriate resources: Identify barriers to discharge with patient and caregiver

## 2022-11-20 NOTE — PROGRESS NOTES
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 Other complicating issues include intra-abdominal abscess and is getting IV Invanz. RN  Assessment completed. A&O x4. Denies pain at this time. 3L O2 via NC continuously. In chair with alarm activated. Call light in reach. Electronically signed by Tiffanie Castillo LPN on 26/84/1544 at 10:56 AM  ROS x10: The patient also complains of severely impaired mobility and activities of daily living. Otherwise no new problems with vision, hearing, nose, mouth, throat, dermal, cardiovascular, GI, , pulmonary, musculoskeletal, psychiatric or neurological. See also Acute Rehab PM&R H&P. Vital signs:  /71   Pulse 80   Temp 98.4 °F (36.9 °C) (Oral)   Resp 16   Ht 4' 11\" (1.499 m)   Wt 153 lb (69.4 kg)   SpO2 94%   BMI 30.90 kg/m²   I/O:   PO/Intake:  fair PO intake,  reg diet low salt    Bowel:   continent   Bladder: continent   no taylor  General:  Patient is well developed,   adequately nourished, and    well kempt. HEENT:    Pupils equal, hearing intact to loud voice, external inspection of ear and nose benign. Inspection of lips, tongue and gums benign    Musculoskeletal: No significant change in strength or tone. All joints stable. Inspection and palpation of digits and nails show no clubbing, cyanosis or inflammatory conditions. Neuro/Psychiatric: Affect: flat but pleasant. Alert and oriented to person, place and situation with min cues. No significant change in deep tendon reflexes or sensation  Lungs:  Diminished, CTA-B. Respiration effort is   normal at rest.     Heart:   S1 = S2,   RRR.        Abdomen:  Soft,  generalized mostly epigastric-tender, no enlargement of liver or spleen. Extremities:   mild lower extremity edema    Skin:   Intact to general survey,  BUE bruises, old rlq drain site --no ss drainage    Rehabilitation:  Physical Therapy:   Bed mobility:  Bed mobility  Bridging: Independent (11/17/22 1018)  Rolling to Left: Modified independent (11/18/22 1007)  Rolling to Right: Modified independent (11/18/22 1007)  Supine to Sit: Minimal assistance (11/18/22 1026)  Sit to Supine: Modified independent (11/18/22 1007)  Scooting: Stand by assistance (11/17/22 1018)  Bed Mobility Comments: Pt requires increased time and effort. VCs for positioning once supine. Increased effort with sup to sit. Pt reports dizziness with initial sup to sit. (11/18/22 1026)  Bed Mobility  Overall Assistance Level: Supervision;Modified Independent (11/19/22 1024)  Additional Factors: Increased time to complete (11/19/22 1024)  Overall Assistance Level: Supervision;Modified Independent (11/19/22 1024)  Additional Factors: Increased time to complete (11/19/22 1024)  Roll Left  Assistance Level: Supervision (11/19/22 1024)  Roll Right  Assistance Level: Supervision (11/19/22 1024)  Sit to Supine  Assistance Level: Supervision (11/19/22 1024)  Skilled Clinical Factors: on mat with rest break required after (11/19/22 1024)  Supine to Sit  Assistance Level: Supervision (11/19/22 1024)  Skilled Clinical Factors: uses valsalva, cues to breathe through exertion (11/19/22 1024)  Transfers:  Transfers  Sit to Stand: Supervision (11/18/22 1019)  Stand to Sit: Supervision (11/18/22 1019)  Bed to Chair: Stand by assistance (11/17/22 0948)  Comment: toilet transfers SBA - cues for safety with O2 line management and reaching for surface in too great a distance (11/17/22 0948)  Transfers  Surface:  To chair with arms;Standard toilet (11/19/22 1320)  Additional Factors: Hand placement cues (11/19/22 1024)  Sit to Stand  Assistance Level: Modified independent (11/20/22 1256)  Skilled Clinical Factors: uses safe and consistent hand placement (11/19/22 1320)  Stand to Sit  Assistance Level: Modified independent (11/20/22 1256)  Skilled Clinical Factors: patient needs cues for safe approach to chair with fatigue (11/19/22 1320)  Bed To/From Chair  Technique: Stand step (ww) (11/19/22 1024)  Car Transfer  Assistance Level: Contact guard assist (11/19/22 1024)  Skilled Clinical Factors: good carryover from previous sessions with in the car, min cues out of car (11/19/22 1024)  Gait:   Ambulation  Surface: Level tile (11/17/22 0949)  Device: Rolling Walker (11/17/22 0949)  Other Apparatus: AFO; Left;Right;O2 (11/17/22 3010)  Assistance: Stand by assistance;Minimal assistance (11/17/22 0949)  Quality of Gait: Assist to manage O2 line due to poor awareness, flexed trunk, fair maneuvering of walker (11/17/22 0949)  Gait Deviations: Slow Raji;Decreased step length (11/17/22 0949)  Distance: 30 feet (11/17/22 0949)  Comments: Pt with 3 L O2 in place. SOB following 30 feet - O2 sat at 92% (11/17/22 0949)  Ambulation  Surface: Level surface (11/20/22 1256)  Device: Rolling walker (11/20/22 1256)  Distance: 75' x 2 (11/20/22 1256)  Activity: Within Room (11/20/22 1256)  Additional Factors: Right AFO; Left AFO; Increased time to complete (11/20/22 1256)  Assistance Level: Supervision;Modified independent (11/20/22 1256)  Gait Deviations: Slow raji (11/20/22 1256)  Skilled Clinical Factors: Steady gait. (11/20/22 1256)  Stairs:  Stairs/Curb  Stairs?: Yes (11/17/22 0949)  Stairs  # Steps : 4 (11/17/22 0949)  Stairs Height: 6\" (11/17/22 0949)  Rails: Bilateral (11/17/22 0949)  Assistance:  Moderate assistance;Minimal assistance (11/17/22 0949)  Comment: mild post LOB with descent requiring recovery assistance; mild SOB following with O2 sat at 92% following with 3L O2 in place (11/17/22 3992)  Stairs  Stair Height: 6'' (11/19/22 1020)  Device: Bilateral handrails (11/19/22 1020)  Number of Stairs: 4 (11/19/22 1020)  Additional Factors: Non-reciprocal going up;Non-reciprocal going down (11/19/22 1020)  Assistance Level: Stand by assist (11/19/22 1020)  Skilled Clinical Factors: increased time, slow steady, assist needed to manage 02 line safely (11/19/22 1020)  W/C mobility:       Occupational Therapy:   Hand Dominance: Right  ADL  Feeding: Independent (11/17/22 1011)  Grooming: Setup (11/17/22 1011)  UE Bathing: Setup (11/17/22 1011)  LE Bathing: Supervision (11/17/22 1011)  UE Dressing: Minimal assistance (11/17/22 1011)  UE Dressing Skilled Clinical Factors: Declines bra, assist pulling shirt all the the way down in the back. (11/17/22 1011)  LE Dressing: Dependent/Total (11/17/22 1011)  LE Dressing Skilled Clinical Factors: Assist with B socks, B shoes, pulling pant legs through d/t stretchy pants, and assist pulling pants up over hips. Has long handled shoe horn from [facility], was unable to utilize this date. (11/17/22 1011)  Toileting: Moderate assistance (11/17/22 1011)  Toileting Skilled Clinical Factors: Assist with pants (11/17/22 1011)  Additional Comments: Pt completed shower ADL as above. Fatigues quickly with frequent rest breaks (11/17/22 1011)  Toilet Transfers  Toilet - Technique: Stand step (11/17/22 1226)  Equipment Used: Grab bars (11/17/22 1226)  Toilet Transfer: Stand by assistance (11/17/22 1226)  Toilet Transfers Comments: Slow transfer, no AFOs d/t pt about to shower (11/17/22 1226)     Shower Transfers  Shower - Transfer From: Wheelchair (11/17/22 1226)  Shower - Transfer Type: To and From (11/17/22 1226)  Shower - Transfer To:  Shower seat with back (11/17/22 1226)  Shower - Technique: Stand pivot (11/17/22 1226)  Shower Transfers: Stand by assistance (11/17/22 1226)  Shower Transfers Comments: Increased time (11/17/22 1226)    Speech Therapy:            Diet/Swallow:                      Lab/X-ray studies reviewed, analyzed and discussed with patient and staff:   Recent Results (from the past 24 hour(s))   Magnesium    Collection Time: 11/20/22  4:27 AM   Result Value Ref Range    Magnesium 1.8 1.7 - 2.4 mg/dL   CBC    Collection Time: 11/20/22  3:33 PM   Result Value Ref Range    WBC 7.1 4.8 - 10.8 K/uL    RBC 4.20 4. 20 - 5.40 M/uL    Hemoglobin 11.6 (L) 12.0 - 16.0 g/dL    Hematocrit 34.1 (L) 37.0 - 47.0 %    MCV 81.1 79.4 - 94.8 fL    MCH 27.7 27.0 - 31.3 pg    MCHC 34.2 33.0 - 37.0 %    RDW 17.5 (H) 11.5 - 14.5 %    Platelets 143 607 - 970 K/uL   Basic Metabolic Panel w/ Reflex to MG    Collection Time: 11/20/22  3:33 PM   Result Value Ref Range    Sodium 138 135 - 144 mEq/L    Potassium reflex Magnesium 4.1 3.4 - 4.9 mEq/L    Chloride 103 95 - 107 mEq/L    CO2 27 20 - 31 mEq/L    Anion Gap 8 (L) 9 - 15 mEq/L    Glucose 116 (H) 70 - 99 mg/dL    BUN 14 8 - 23 mg/dL    Creatinine 0.60 0.50 - 0.90 mg/dL    Est, Glom Filt Rate >60.0 >60    Calcium 8.9 8.5 - 9.9 mg/dL   Lipase    Collection Time: 11/20/22  3:33 PM   Result Value Ref Range    Lipase 21 12 - 95 U/L   Comprehensive Metabolic Panel    Collection Time: 11/20/22  3:33 PM   Result Value Ref Range    Potassium 4.1 3.4 - 4.9 mEq/L    Total Protein 6.6 6.3 - 8.0 g/dL    Albumin 2.9 (L) 3.5 - 4.6 g/dL    Total Bilirubin 0.4 0.2 - 0.7 mg/dL    Alkaline Phosphatase 57 40 - 130 U/L    ALT 16 0 - 33 U/L    AST 25 0 - 35 U/L    Globulin 3.7 (H) 2.3 - 3.5 g/dL       CTA CHEST  11/11/2022  No evidence of pulmonary embolism. Severe cardiomegaly with evidence of congestive heart failure manifesting as mild edema and bilateral trace pleural effusions as well as significant right heart dysfunction including partially visualized perihepatic ascites. CT GUIDED NEEDLE PLACEMENT1. Successful aspiration of left lower quadrant abdominal abscess. 2.Only a few drops of very thick gelatinous serosanguineous fluid with extensive solid debris was able to be aspirated. Due to this a drain was unable to be placed.  Patient will be seen general surgery for evaluation and possible surgical intervention. CT ABDOMEN PELVIS      1. The right-sided abscess drain has been completely pulled out of the abscess and now lies in the right abdominal subcutaneous soft tissues. 2.The right lower abdominal abscess has increased in size when compared to prior study dating September 14 likely due to accumulation of infectious fluid due to displacement of the drainage catheter. 3.Again seen is a large cystic mass in the lower pelvis. ABDOMEN The visualized portion of the lung bases demonstrates a small effusion in the left thoracic cavity. The liver is of normal size and attenuation without focal lesions. The spleen is unremarkable. Kidneys demonstrate prompt enhancement and excretion of contrast without signs of hydronephrosis or focal mass. The pancreas and adrenals are unremarkable. The upper abdominal bowel loops appear normal. Again seen is ectasia of the visualized abdominal aorta and tortuosity, unchanged from prior. 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 There are no signs of upper abdominal fluid collections. PELVIS  Again seen is a large abscess in the right lower abdomen/pelvis abutting the cecum now measuring 6.6 x 9.4 in transverse and AP dimensions, previously measuring 6 x 6.7 and AP and transverse dimensions. The previously seen abscess drain has now been pulled out of the abscess and lies in the subcutaneous soft tissues and is not contributing to any drainage of the abscess consistent with abscess enlargement. Again seen is a large cystic mass in the pelvis. XR CHEST  11/11/2022    The heart is enlarged. Bilateral aspect airspace disease is noted which could represent CHF or less likely pneumonia. There is no consolidation seen within the right lung base. There is left lung base consolidation which is favored to represent either atelectasis or a pleural effusion.  Note is made of a right-sided PICC line.  The catheter tip is at the level of the clavicle head. Cardiomegaly with multifocal bilateral airspace disease favored to represent CHF Left lung base consolidation which could represent atelectasis, pleural effusion or less likely pneumonia. .        Previous extensive, complex labs, notes and diagnostics reviewed and analyzed. ALLERGIES:    Allergies as of 11/16/2022 - Fully Reviewed 11/16/2022   Allergen Reaction Noted    Latex  07/19/2017    Tape [adhesive tape]  06/28/2016      (please also verify by checking MAR)     Today I evaluated this patient for periodic reassessment of medical and functional status. The patient was discussed in detail at the treatment team meeting focusing on current medical issues, progress in therapies, social issues, psychological issues, barriers to progress and strategies to address these barriers, and discharge planning. See the addendum to rehab progress note-as a second progress note in the chart. The patient continues to be high risk for future disability and their medical and rehabilitation prognosis continue to be good and therefore, we will continue the patient's rehabilitation course as planned. The patient's tentative discharge date was set. Patient and family education was discussed. The patient was made aware of the team discussion regarding their progress. 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 Functional and medical status reassessed regarding patients ability to participate in therapies and patient found to be able to participate in acute intensive comprehensive inpatient rehabilitation program including PT/OT to improve balance, ambulation, ADLs, and to improve the P/AROM. Therapeutic modifications regarding activities in therapies, place, amount of time per day and intensity of therapy made daily.   In bed therapies or bedside therapies prn. Bowel and Bladder dysfunction  , Neurogenic bowel and bladder:  frequent toileting, ambulate to bathroom with assistance, check post void residuals. Check for C.difficile x1 if >2 loose stools in 24 hours, continue bowel & bladder program.  Monitor bowel and bladder function. Lactinex 2 PO every AC. MOM prn, Brown Bomb prn, Glycerin suppository prn, enema prn. Encourage therapy and nursing to co-treat and problem solve re continence. Severe abd pain as well as generalized OA pain: reassess pain every shift and prior to and after each therapy session, give prn Tylenol and consider scheduled Tylenol, modalities prn in therapy, masage, Lidoderm, K-pad prn. Consider scheduled AM pain meds. Skin healing abd and breakdown risk:  continue pressure relief program.  Daily skin exams and reports from nursing. Fatigue due to nutritional and hydration deficiency: Add and titrate vitamin B12 vitamin D and CoQ10 continue to monitor I&Os, calorie counts prn, dietary consult prn. Add healthy snack at night. Acute episodic insomnia with situational adjustment disorder:  prn Ambien, monitor for day time sedation. Falls risk elevated:  patient to use call light to get nursing assistance to get up, bed and chair alarm. Elevated DVT risk: progressive activities in PT, continue prophylaxis PORTILOL hose, elevation and  [place] . Complex discharge planning:  Weekly team meeting every Monday to re-assess progress towards goals, discuss and address social, psychological and medical comorbidities and to address difficulties they may be having progressing in therapy. Patient and family education is in progress. The patient is to follow-up with their family physician after discharge.         Complex Active General Medical Issues that complicate care Assess & Plan:    Charcot-Arleen tooth neuropathy-bilateral AFOs   A-fib,   CHF (congestive heart failure,  Aortic thrombus   -Acute rehab to monitor heart rate and rhythm with the option of telemetry and the effects of chronotropic medication with respect to increasing physical activity and exercise in PT, OT, ADLs with medication titration to lowest effective dosing. Continue blood signs every shift focusing on heart rate, rhythm and blood pressure checks with orthostatic checks-monitoring the effect of exercise, therapy and posture. Consult hospitalist for backup medical and adjust/add medications ( Entresto, Xarelto, Lasix, Lanoxin, Coreg). Monitor heart rate and blood pressure as well as medications effects on vital signs before during and after therapy with especial focus on preventing orthostasis and falls risk. Lumbar stenosis with neurogenic claudication    Hypokalemia-check BMP and supplement as needed,      Right lower quadrant abdominal pain dt   Abdominal mass  Hx of drainage of abscess,  Streptococcus viridans infection-IV antibiotics patient and family have been trained. Reconsult infectious disease     Anemia-Monitor vital signs monitor for orthostasis and tachycardia, check H&H prn. Vitamin B12 and iron-dose iron with food to prevent GI upset. Monitor for constipation. Monitor stools for blood. Palliative care encounter-for pain as needed    Depression-emotional support provided daily, vitamin B12, encourage participation in rehabilitation support group and recreational therapy, adjust/add medications ( Celexa )  GERD-Elevate head of bed after meals, monitor stools for blood, lowest effective dose of PPI, consider Tums. Focus of today's plan-  Initiate and modify therapuetic plan to meet patients individual needs, add rest breaks as needed, and Focus on emotional health and caregiver involvement in care.     Tomás Cr D.O., PM&R     Attending    [facility] Pamela Cabrera

## 2022-11-20 NOTE — PLAN OF CARE
Problem: Discharge Planning  Goal: Discharge to home or other facility with appropriate resources  11/20/2022 1143 by Alma Contreras RN  Outcome: Progressing  Flowsheets (Taken 11/20/2022 1137)  Discharge to home or other facility with appropriate resources: Identify barriers to discharge with patient and caregiver  11/19/2022 2344 by Satya Melissa RN  Outcome: Progressing  Flowsheets (Taken 11/19/2022 2100)  Discharge to home or other facility with appropriate resources: Identify barriers to discharge with patient and caregiver     Problem: Safety - Adult  Goal: Free from fall injury  11/20/2022 1143 by Alma Contreras RN  Outcome: Progressing  11/19/2022 2344 by Satya Melissa RN  Outcome: Progressing     Problem: ABCDS Injury Assessment  Goal: Absence of physical injury  11/20/2022 1143 by Alma Contreras RN  Outcome: Progressing  11/19/2022 2344 by Satya Melissa RN  Outcome: Progressing     Problem: Skin/Tissue Integrity  Goal: Absence of new skin breakdown  Description: 1. Monitor for areas of redness and/or skin breakdown  2. Assess vascular access sites hourly  3. Every 4-6 hours minimum:  Change oxygen saturation probe site  4. Every 4-6 hours:  If on nasal continuous positive airway pressure, respiratory therapy assess nares and determine need for appliance change or resting period.   11/20/2022 1143 by Alma Contreras RN  Outcome: Progressing  11/19/2022 2344 by Satya Melissa RN  Outcome: Progressing     Problem: Chronic Conditions and Co-morbidities  Goal: Patient's chronic conditions and co-morbidity symptoms are monitored and maintained or improved  11/20/2022 1143 by Alma Contreras RN  Outcome: Progressing  Flowsheets (Taken 11/20/2022 1137)  Care Plan - Patient's Chronic Conditions and Co-Morbidity Symptoms are Monitored and Maintained or Improved: Monitor and assess patient's chronic conditions and comorbid symptoms for stability, deterioration, or improvement  11/19/2022 2344 by Aj Bowen RN  Outcome: Progressing  Flowsheets (Taken 11/19/2022 2100)  Care Plan - Patient's Chronic Conditions and Co-Morbidity Symptoms are Monitored and Maintained or Improved: Monitor and assess patient's chronic conditions and comorbid symptoms for stability, deterioration, or improvement     Problem: Pain  Goal: Verbalizes/displays adequate comfort level or baseline comfort level  11/20/2022 1143 by Stephen Bates RN  Outcome: Progressing  11/19/2022 2344 by Aj Bowen RN  Outcome: Progressing

## 2022-11-20 NOTE — PROGRESS NOTES
Infectious Disease     Patient Name: Luis M Worthy  Date: 11/20/2022  YOB: 1932  Medical Record Number: [id number]        Intra-abdominal abscess          History of intra-abdominal abscess drained percutaneously treated with Invanz switch to Zosyn not resolved after an extended course of therapy repeat CT scan showed persistent abscess    Patient now to continue a prolonged course of IV antibiotics with Zosyn      Review of Systems   Constitutional:  Negative for chills, diaphoresis, fatigue and fever. Respiratory: Negative. Cardiovascular: Negative. Gastrointestinal: Negative. Physical Exam  Cardiovascular:      Heart sounds: Normal heart sounds. No murmur heard. Pulmonary:      Effort: No respiratory distress. Breath sounds: No stridor. No wheezing, rhonchi or rales. Abdominal:      General: Abdomen is flat. Bowel sounds are normal. There is no distension. Palpations: Abdomen is soft. There is no mass. Tenderness: There is no abdominal tenderness. There is no guarding. Blood pressure 114/71, pulse 80, temperature 98.4 °F (36.9 °C), temperature source Oral, resp. rate 16, height 4' 11\" (1.499 m), weight 153 lb (69.4 kg), SpO2 94 %.       .   Lab Results   Component Value Date    WBC 7.6 11/17/2022    HGB 12.3 11/17/2022    HCT 37.5 11/17/2022    MCV 82.7 11/17/2022     11/17/2022     Lab Results   Component Value Date/Time     11/17/2022 10:34 AM    K 4.1 11/17/2022 10:34 AM    K 3.9 11/17/2022 06:27 AM    CL 94 11/17/2022 10:34 AM    CO2 28 11/17/2022 10:34 AM    BUN 12 11/17/2022 10:34 AM    CREATININE 0.49 11/17/2022 10:34 AM    GLUCOSE 113 11/17/2022 10:34 AM    CALCIUM 8.8 11/17/2022 10:34 AM                ASSESSMENT:  Patient Active Problem List   Diagnosis    Lumbar stenosis with neurogenic claudication    Acquired scoliosis    Osteoporosis    Charcot Dalphine Golden Tooth muscular atrophy    Excess weight    Osteoarthritis of lumbar spine with myelopathy    Osteoarthritis    Myelopathy (HCC)    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    Headache    Depression    Thoracic aortic aneurysm without rupture    Descending aortic aneurysm (HCC)    Vertigo    Shortness of breath    Essential hypertension    Acute on chronic combined systolic and diastolic CHF (congestive heart failure) (HCC)    Gait abnormality    A-fib (HCC)    Pleural effusion    Hypoxia    Acute pulmonary edema (HCC)    Acute on chronic combined systolic (congestive) and diastolic (congestive) heart failure (HCC)    Acute cystitis with hematuria    Chronic diastolic CHF (congestive heart failure) (HCC)    Right lower quadrant abdominal pain    Hyponatremia    Hypokalemia    Anemia    CHF (congestive heart failure) (HCC)    Hypothyroid    Abdominal mass    Central retinal vein occlusion, left eye, stable    Hx of drainage of abscess    Abdominal pain    Streptococcus viridans infection    Aortic thrombus (HCC)    Intra-abdominal abscess (HCC)    Adnexal mass    Change or removal of drains    Difficulty in walking    Muscle weakness (generalized)    Acute diastolic HF (heart failure) (HCC)    CHF (congestive heart failure), NYHA class I, acute on chronic, combined ([address]    Gastro-esophageal reflux disease without esophagitis    Intracardiac thrombosis, not elsewhere classified    Impaired mobility and ADLs    Palliative care encounter    Advanced care planning/counseling discussion    Goals of care, counseling/discussion         PLAN:    Intra-abdominal abscess    Zosyn at least 2 more week

## 2022-11-20 NOTE — FLOWSHEET NOTE
Patient assessment is complete. No complaints of pain. Patient has a PICC line in her right upper arm. She is also on 3 L of oxygen here and at home.

## 2022-11-20 NOTE — PROGRESS NOTES
Progress Note    Date:11/20/2022       [facility]  Patient Name:Darryl Rodriguez     YOB: 1932     Age:89 y.o. 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 11/17/2022 Yes    A-fib ([address] 11/17/2022 Yes    Lumbar stenosis with neurogenic claudication 11/17/2022 Yes    Right lower quadrant abdominal pain 11/17/2022 Yes    Hypokalemia 11/17/2022 Yes    Anemia 11/17/2022 Yes    CHF (congestive heart failure) ([address] 11/17/2022 Yes    Abdominal mass 11/17/2022 Yes    Hx of drainage of abscess 11/17/2022 Yes    Streptococcus viridans infection 11/17/2022 Yes    Aortic thrombus ([address] 11/17/2022 Yes    Impaired mobility and ADLs 11/17/2022 Yes    Palliative care encounter 11/17/2022 Yes    Advanced care planning/counseling discussion 11/17/2022 Yes    Goals of care, counseling/discussion 11/17/2022 Yes    Depression 11/17/2022 Yes       Plan:      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 Altagracia Moore. Intra-abdominal abscess-large and persistent-not a surgical candidate. Currently on IV Zosyn with end date of December 5, 2022. Patient will need to follow in infectious disease clinic that week. She will have a weekly CBC, BMP and CRP. Acute on chronic diastolic congestive heart failure-on p.o. Lasix, intake and output, daily weight     Acute on chronic hypoxemic respiratory failure-baseline home oxygen requirement of 2 L nasal cannula-she is on 3 L nasal cannula and stable. Atrial fibrillation-anticoagulated on Xarelto. Digoxin currently in sinus rhythm     Ascending aortic aneurysm 5.3 cm-no planned surgical intervention per daughter. She has mural thrombus and is on Xarelto. This is documented in cardiology's note     Coronary artery disease moderate LAD/hypertension-no active chest pain. Patient is on Entresto, Lasix 40 mg oral daily and carvedilol 6.25 mg p.o. twice a day     Hypothyroidism-Synthroid     Epigastric pain-GI consulted during hospitalization prior to to admission to acute rehabilitation with the recommendation of PPI and Carafate. Differential gastritis peptic ulcer disease. May consider upper endoscopy diagnostic only if symptoms persist.  Today she is complaining of epigastric pain. I added on lipase and CMP. Recommend reconsultation to gastroenterology for evaluation for EGD     Hospitalist have been consulted for medical management. I am managing acute needs. Patient will need to follow as outpatient for specialty needs and management of chronic diseases outpatient. Time spent 25 minutes    Subjective   Interval History Status: not changed. Complains of epigastric pain today.   Denies nausea or vomiting    Review of Systems   12 point review of systems reviewed with patient pertinent positives listed above  Medications   Scheduled Meds:    cyanocobalamin  1,000 mcg IntraMUSCular Weekly    coenzyme Q10  100 mg Oral Daily    sodium chloride flush  5-40 mL IntraVENous 2 times per day    digoxin  125 mcg Oral Daily    Vitamin D  2,000 Units Oral Dinner    rivaroxaban  15 mg Oral Daily with breakfast    levothyroxine  88 mcg Oral Daily    ferrous sulfate  325 mg Oral BID WC    carvedilol  6.25 mg Oral BID    citalopram  20 mg Oral Daily    pantoprazole  40 mg Oral Daily    lactobacillus acidophilus  4 tablet Oral TID    potassium chloride  20 mEq Oral BID    sacubitril-valsartan  1 tablet Oral BID    furosemide  40 mg Oral Daily    sucralfate  1 g Oral 4 times per day    piperacillin-tazobactam  3,375 mg IntraVENous Q8H     Continuous Infusions:   PRN Meds: lidocaine, acetaminophen, bisacodyl, sodium phosphate, sodium chloride flush, ondansetron **OR** ondansetron, polyethylene glycol    Past History    Past Medical History:   has a past medical history of Ascending aortic aneurysm, Charcot Arleen Tooth muscular atrophy, CHF (congestive heart failure) ([facility]), Chronic diastolic CHF (congestive heart failure) ([address] Depression, Descending aortic aneurysm ([facility] Essential hypertension, Headache, HTN (hypertension), Impaired mobility and activities of daily living, Lumbar stenosis with neurogenic claudication, Myelopathy ([address] and Osteoarthritis. Social History:   reports that she has never smoked. She has never used smokeless tobacco. She reports that she does not drink alcohol and does not use drugs. Family History:   Family History   Problem Relation Age of Onset    Arthritis Mother     Arthritis Father     High Blood Pressure Father        Physical Examination      Vitals:  /71   Pulse 80   Temp 98.4 °F (36.9 °C) (Oral)   Resp 16   Ht 4' 11\" (1.499 m)   Wt 153 lb (69.4 kg)   SpO2 94%   BMI 30.90 kg/m²   Temp (24hrs), Av.4 °F (36.9 °C), Min:98.4 °F (36.9 °C), Max:98.4 °F (36.9 °C)      I/O (24Hr): No intake or output data in the 24 hours ending 22 1448    LUNGS:  No increased work of breathing, good air exchange, clear to auscultation bilaterally, no crackles or wheezing  CARDIOVASCULAR:  normal S1 and S2  ABDOMEN:  normal bowel sounds and tenderness noted in the epigastric region  NEUROLOGIC:  Awake, alert, oriented to name, place and time. Cranial nerves II-XII are grossly intact. Motor is 5 out of 5 bilaterally. Cerebellar finger to nose, heel to shin intact. Sensory is intact. Babinski down going, Romberg negative, and gait is normal.    Labs/Imaging/Diagnostics   Labs:  CBC:No results for input(s): WBC, RBC, HGB, HCT, MCV, RDW, PLT in the last 72 hours. CHEMISTRIES:  Recent Labs     22  0548 22  0525 22  0427   MG 2.0 1.9 1.8     PT/INR:No results for input(s): PROTIME, INR in the last 72 hours. APTT:No results for input(s): APTT in the last 72 hours.   LIVER PROFILE:No results for input(s): AST, ALT, BILIDIR, BILITOT, ALKPHOS in the last 72 hours. Imaging Last 24 Hours:  No results found.     Electronically signed by ADRIENNE Acosta on 11/20/22 at 2:48 PM EST

## 2022-11-20 NOTE — PROGRESS NOTES
Physical Therapy Rehab Treatment Note  [facility]  [facility]       NAME: Roopa French  : 1932 (80 y.o.)  MRN: [id number]  CODE STATUS: Full Code    Date of Service: 2022       Restrictions:  Restrictions/Precautions: Fall Risk       SUBJECTIVE:   Subjective: Patient in room: eating and on IV. \"I feel good! \"    Pain  Pain: denies pre and post pain      OBJECTIVE:                  Sit to Stand  Assistance Level: Modified independent  Stand to Sit  Assistance Level: Modified independent    Ambulation  Surface: Level surface  Device: Rolling walker  Distance: 75' x 2  Activity: Within Room  Additional Factors: Right AFO; Left AFO; Increased time to complete  Assistance Level: Supervision;Modified independent  Gait Deviations: Slow raji  Skilled Clinical Factors: Steady gait. PT Exercises  A/AROM Exercises: Seated: Side kicks, marching, LAQ, AP x 20  Resistive Exercises: MRE'S: ABD/ADD x 20          Activity Tolerance  Activity Tolerance: Patient tolerated treatment well             ASSESSMENT/PROGRESS TOWARDS GOALS:   Assessment  Assessment: Pt seen in room secondary to having to wait for pt to finish breakfast and was on IV. Pt progressing towards all goals and there is no significant issues or concerns at this time. Activity Tolerance: Patient tolerated treatment well  Discharge Recommendations: Continue to assess pending progress    Goals:  Long Term Goals  Long Term Goal 1: Pt to complete bed mobility with indep  Long Term Goal 2: Pt to complete transfers with indep  Long Term Goal 3: Pt to ambulate 50-150ft with LRD and Supervision - indep for 20 feet with ability to manage O2 line  Long Term Goal 4: Pt to complete 2 steps with HR and CGA    PLAN OF CARE/Safety:   Safety Devices  Type of Devices: Chair alarm in place; Left in chair; All fall risk precautions in place      Therapy Time:   Individual   Time In 0807   Time Out 0830   Minutes 23 Minutes:23  Transfer/Bed mobility trainin  Gait training: 10  Neuro re education: 0  Therapeutic ex: [address], PTA, 22 at 12:58 PM

## 2022-11-21 LAB — MAGNESIUM: 1.8 MG/DL (ref 1.7–2.4)

## 2022-11-21 PROCEDURE — 97110 THERAPEUTIC EXERCISES: CPT

## 2022-11-21 PROCEDURE — 97535 SELF CARE MNGMENT TRAINING: CPT

## 2022-11-21 PROCEDURE — 97112 NEUROMUSCULAR REEDUCATION: CPT

## 2022-11-21 PROCEDURE — 6370000000 HC RX 637 (ALT 250 FOR IP): Performed by: INTERNAL MEDICINE

## 2022-11-21 PROCEDURE — 97116 GAIT TRAINING THERAPY: CPT

## 2022-11-21 PROCEDURE — 36415 COLL VENOUS BLD VENIPUNCTURE: CPT

## 2022-11-21 PROCEDURE — 6360000002 HC RX W HCPCS: Performed by: INTERNAL MEDICINE

## 2022-11-21 PROCEDURE — 83735 ASSAY OF MAGNESIUM: CPT

## 2022-11-21 PROCEDURE — 1180000000 HC REHAB R&B

## 2022-11-21 PROCEDURE — 2580000003 HC RX 258: Performed by: INTERNAL MEDICINE

## 2022-11-21 PROCEDURE — 97530 THERAPEUTIC ACTIVITIES: CPT

## 2022-11-21 PROCEDURE — 6370000000 HC RX 637 (ALT 250 FOR IP): Performed by: PHYSICAL MEDICINE & REHABILITATION

## 2022-11-21 PROCEDURE — 99232 SBSQ HOSP IP/OBS MODERATE 35: CPT | Performed by: INTERNAL MEDICINE

## 2022-11-21 PROCEDURE — 94664 DEMO&/EVAL PT USE INHALER: CPT

## 2022-11-21 RX ORDER — ALBUTEROL SULFATE 90 UG/1
2 AEROSOL, METERED RESPIRATORY (INHALATION) EVERY 6 HOURS PRN
Status: DISCONTINUED | OUTPATIENT
Start: 2022-11-21 | End: 2022-11-23 | Stop reason: HOSPADM

## 2022-11-21 RX ORDER — ALBUTEROL SULFATE 90 UG/1
2 AEROSOL, METERED RESPIRATORY (INHALATION) EVERY 6 HOURS PRN
Qty: 18 G | Refills: 3 | Status: SHIPPED | OUTPATIENT
Start: 2022-11-21

## 2022-11-21 RX ORDER — SUCRALFATE 1 G/1
1 TABLET ORAL 4 TIMES DAILY
Qty: 120 TABLET | Refills: 3 | Status: SHIPPED | OUTPATIENT
Start: 2022-11-21

## 2022-11-21 RX ORDER — DIGOXIN 125 MCG
125 TABLET ORAL DAILY
Qty: 30 TABLET | Refills: 3 | Status: SHIPPED | OUTPATIENT
Start: 2022-11-22

## 2022-11-21 RX ADMIN — FERROUS SULFATE TAB 325 MG (65 MG ELEMENTAL FE) 325 MG: 325 (65 FE) TAB at 17:10

## 2022-11-21 RX ADMIN — Medication 10 ML: at 20:11

## 2022-11-21 RX ADMIN — SUCRALFATE 1 G: 1 TABLET ORAL at 11:55

## 2022-11-21 RX ADMIN — LACTOBACILLUS TAB 4 TABLET: TAB at 08:28

## 2022-11-21 RX ADMIN — PANTOPRAZOLE SODIUM 40 MG: 40 TABLET, DELAYED RELEASE ORAL at 08:28

## 2022-11-21 RX ADMIN — CITALOPRAM HYDROBROMIDE 20 MG: 20 TABLET ORAL at 08:29

## 2022-11-21 RX ADMIN — SUCRALFATE 1 G: 1 TABLET ORAL at 00:14

## 2022-11-21 RX ADMIN — POTASSIUM CHLORIDE 20 MEQ: 1500 TABLET, EXTENDED RELEASE ORAL at 20:06

## 2022-11-21 RX ADMIN — CARVEDILOL 6.25 MG: 6.25 TABLET, FILM COATED ORAL at 08:28

## 2022-11-21 RX ADMIN — FUROSEMIDE 40 MG: 40 TABLET ORAL at 08:28

## 2022-11-21 RX ADMIN — PIPERACILLIN AND TAZOBACTAM 3375 MG: 3; .375 INJECTION, POWDER, FOR SOLUTION INTRAVENOUS at 06:03

## 2022-11-21 RX ADMIN — LEVOTHYROXINE SODIUM 88 MCG: 88 TABLET ORAL at 06:02

## 2022-11-21 RX ADMIN — SUCRALFATE 1 G: 1 TABLET ORAL at 22:29

## 2022-11-21 RX ADMIN — LACTOBACILLUS TAB 4 TABLET: TAB at 13:37

## 2022-11-21 RX ADMIN — PIPERACILLIN AND TAZOBACTAM 3375 MG: 3; .375 INJECTION, POWDER, FOR SOLUTION INTRAVENOUS at 15:34

## 2022-11-21 RX ADMIN — POTASSIUM CHLORIDE 20 MEQ: 1500 TABLET, EXTENDED RELEASE ORAL at 08:28

## 2022-11-21 RX ADMIN — RIVAROXABAN 15 MG: 15 TABLET, FILM COATED ORAL at 08:28

## 2022-11-21 RX ADMIN — CARVEDILOL 6.25 MG: 6.25 TABLET, FILM COATED ORAL at 20:06

## 2022-11-21 RX ADMIN — PIPERACILLIN AND TAZOBACTAM 3375 MG: 3; .375 INJECTION, POWDER, FOR SOLUTION INTRAVENOUS at 22:32

## 2022-11-21 RX ADMIN — DIGOXIN 125 MCG: 125 TABLET ORAL at 08:28

## 2022-11-21 RX ADMIN — SUCRALFATE 1 G: 1 TABLET ORAL at 17:10

## 2022-11-21 RX ADMIN — SUCRALFATE 1 G: 1 TABLET ORAL at 06:01

## 2022-11-21 RX ADMIN — SACUBITRIL AND VALSARTAN 1 TABLET: 24; 26 TABLET, FILM COATED ORAL at 08:28

## 2022-11-21 RX ADMIN — FERROUS SULFATE TAB 325 MG (65 MG ELEMENTAL FE) 325 MG: 325 (65 FE) TAB at 08:29

## 2022-11-21 RX ADMIN — Medication 10 ML: at 08:31

## 2022-11-21 RX ADMIN — LACTOBACILLUS TAB 4 TABLET: TAB at 20:06

## 2022-11-21 RX ADMIN — Medication 100 MG: at 08:28

## 2022-11-21 RX ADMIN — Medication 2000 UNITS: at 17:10

## 2022-11-21 RX ADMIN — SACUBITRIL AND VALSARTAN 1 TABLET: 24; 26 TABLET, FILM COATED ORAL at 20:06

## 2022-11-21 ASSESSMENT — ENCOUNTER SYMPTOMS
EYES NEGATIVE: 1
RESPIRATORY NEGATIVE: 1
WHEEZING: 0
BLOOD IN STOOL: 0
NAUSEA: 0
SHORTNESS OF BREATH: 0
CHEST TIGHTNESS: 0
ABDOMINAL PAIN: 1
STRIDOR: 0
COUGH: 0

## 2022-11-21 ASSESSMENT — PAIN SCALES - WONG BAKER
WONGBAKER_NUMERICALRESPONSE: 2
WONGBAKER_NUMERICALRESPONSE: 2

## 2022-11-21 ASSESSMENT — PAIN SCALES - GENERAL
PAINLEVEL_OUTOF10: 0
PAINLEVEL_OUTOF10: 0

## 2022-11-21 NOTE — CARE COORDINATION
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    CO MORBIDITIES:      Past Medical History:   Diagnosis Date    Ascending aortic aneurysm 2017    Charcot Arleen Tooth muscular atrophy     CHF (congestive heart failure) (HCC)     Chronic diastolic CHF (congestive heart failure) ([address] 2022    Depression     Descending aortic [facility] 2017    Essential hypertension 2018    Headache     HTN (hypertension)     Impaired mobility and activities of daily living     Lumbar stenosis with neurogenic claudication     Myelopathy (HCC)     Osteoarthritis      Past Surgical History:   Procedure Laterality Date    IR INS PICC VAD W SQ PORT GREATER THAN 5  2022    IR INS PICC VAD W SQ PORT GREATER THAN 5 2022 [facility] SPECIAL PROCEDURE    JOINT REPLACEMENT Bilateral     knees    OTHER SURGICAL HISTORY Right 2022    cat scan guided abdominal mass aspiration with drain placement by Dr. Martell Numbers Left 2021    LEFT PLEURAL CATHETER INSERTION performed by Wendy Cr MD at [place] [id number] Left 2021    total of 755 cc removed per Dr Joy Alas specimen sent to lab        Restrictions  Restrictions/Precautions: Fall Risk  CASE MANAGEMENT    Social/Functional History  Social/Functional History  Lives With: Alone  Type of Home: House  Home Layout: One level  Home Access: Stairs to enter with rails  Entrance Stairs - Number of Steps: 2  Entrance Stairs - Rails: Both  Bathroom Shower/Tub: Tub/Shower unit  Bathroom Equipment: Grab bars in shower  Home Equipment: Cane (B AFOs)  Has the patient had two or more falls in the past year or any fall with injury in the past year?: No  ADL Assistance: Independent  Homemaking Assistance:  (Son assists with shopping, family checks on daily)  Ambulation Assistance: Independent (Cane  and B AFOs)  Transfer Assistance: Independent  Active : No  Patient's  Info: family - son lives close by; dtr lives in [place]  Mode of Transportation: SUV  Education: no  Occupation: Retired  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 Pt is [place] speaking but dialect unavailable through [employer] ; family not present today       Pts personal preferences: n/a    Pts assets/resources/support system: son    COVERAGE INFORMATION:Payor: MEDICARE / Plan: MEDICARE PART A AND B / Product Type: *No Product type* /       NURSING  No active isolations    Weight: 154 lb 12.8 oz (70.2 kg) / Body mass index is 31.27 kg/m². ADULT DIET; Regular; Low Sodium (2 gm)    SpO2: 95 % (11/21/22 0744)  O2 Flow Rate (L/min): 3 L/min (11/17/22 1930)    Oxygen to be continued upon discharge: Yes  Home-going needs (nocturnal Pox, sleep study, RX, equipment): Yes    Skin Issues: Yes; abdominal abscess scarring---no draining  Home-going needs (education, training, RX, Wound RN): Yes    Pain Managed: Yes    Bladder continence: Yes  Discharging with Taylor: No   Training done: No   Urology following: No   Plan/date to remove taylor: No   Bladder retraining started: No    Bowel continence:  Yes  Last BM date: 11/19/22  Need for bowel program: Yes    Anticoagulants: Xarelto  Home-going needs (Education, labs):  Yes    Antibiotics: Zosyn--PICC in R arm  Stop date: TBD  Home-going needs (education, RX, PICC): Yes      Other: Bilateral AFOs      PHYSICAL THERAPY  Bed mobility:  Bed mobility  Bridging: Independent (11/17/22 1018)  Rolling to Left: Modified independent (11/18/22 1007)  Rolling to Right: Modified independent (11/18/22 1007)  Supine to Sit: Minimal assistance (11/18/22 1026)  Sit to Supine: Modified independent (11/18/22 1007)  Scooting: Stand by assistance (11/17/22 1018)  Bed Mobility Comments: Pt requires increased time and effort. VCs for positioning once supine. Increased effort with sup to sit. Pt reports dizziness with initial sup to sit. (11/18/22 1026)  Bed Mobility  Overall Assistance Level: Supervision;Modified Independent (11/19/22 1024)  Additional Factors: Head of bed flat; Without handrails (11/21/22 0928)  Overall Assistance Level: Supervision;Modified Independent (11/19/22 1024)  Additional Factors: Head of bed flat; Without handrails (11/21/22 0928)  Roll Left  Assistance Level: Modified independent (11/21/22 0928)  Roll Right  Assistance Level: Modified independent (11/21/22 0928)  Sit to Supine  Assistance Level: Modified independent (11/21/22 0928)  Skilled Clinical Factors: on mat with rest break required after (11/19/22 1024)  Supine to Sit  Assistance Level: Modified independent (11/21/22 0928)  Skilled Clinical Factors: uses valsalva, cues to breathe through exertion (11/19/22 1024)  Scooting  Assistance Level: Modified independent (11/21/22 0928)  Transfers:  Bed mobility  Bridging: Independent (11/17/22 1018)  Rolling to Left: Modified independent (11/18/22 1007)  Rolling to Right: Modified independent (11/18/22 1007)  Supine to Sit: Minimal assistance (11/18/22 1026)  Sit to Supine: Modified independent (11/18/22 1007)  Scooting: Stand by assistance (11/17/22 1018)  Bed Mobility Comments: Pt requires increased time and effort. VCs for positioning once supine. Increased effort with sup to sit. Pt reports dizziness with initial sup to sit. (11/18/22 1026)  Bed Mobility  Overall Assistance Level: Supervision;Modified Independent (11/19/22 1024)  Additional Factors: Head of bed flat; Without handrails (11/21/22 0928)  Overall Assistance Level: Supervision;Modified Independent (11/19/22 1024)  Additional Factors: Head of bed flat; Without handrails (11/21/22 0815)  Roll Left  Assistance Level: Modified independent (11/21/22 0928)  Roll Right  Assistance Level: Modified independent (11/21/22 0928)  Sit to Supine  Assistance Level: Modified independent (11/21/22 0928)  Skilled Clinical Factors: on mat with rest break required after (11/19/22 1024)  Supine to Sit  Assistance Level: Modified independent (11/21/22 0928)  Skilled Clinical Factors: uses valsalva, cues to breathe through exertion (11/19/22 1024)  Scooting  Assistance Level: Modified independent (11/21/22 0928)  Gait:   Ambulation  Surface: Level tile (11/17/22 0949)  Device: [employer] Walker (11/17/22 0949)  Other Apparatus: AFO; Left;Right;O2 (11/17/22 9188)  Assistance: Stand by assistance;Minimal assistance (11/17/22 0949)  Quality of Gait: Assist to manage O2 line due to poor awareness, flexed trunk, fair maneuvering of walker (11/17/22 0949)  Gait Deviations: Slow Lea;Decreased step length (11/17/22 0949)  Distance: 30 feet (11/17/22 0949)  Comments: Pt with 3 L O2 in place. SOB following 30 feet - O2 sat at 92% (11/17/22 0949)  Ambulation  Surface: Carpet (11/21/22 0929)  Device: Rolling walker (11/21/22 0929)  Distance: 50' x 2 (11/21/22 1421)  Activity: Within Unit (11/21/22 1081)  Additional Factors: Right AFO; Left AFO; Increased time to complete (11/21/22 0929)  Assistance Level: Modified independent;Supervision (11/21/22 1421)  Gait Deviations: Slow lea (11/21/22 1421)  Skilled Clinical Factors: Steady gait. (11/21/22 1421)  Stairs:  Stairs/Curb  Stairs?: Yes (11/17/22 0949)  Stairs  # Steps : 4 (11/17/22 0949)  Stairs Height: 6\" (11/17/22 0949)  Rails: Bilateral (11/17/22 0949)  Assistance:  Moderate assistance;Minimal assistance (11/17/22 0949)  Comment: mild post LOB with descent requiring recovery assistance; mild SOB following with O2 sat at 92% following with 3L O2 in place (11/17/22 0949)  Stairs  Stair Height: 6'' (11/21/22 0929)  Device: Bilateral handrails (11/21/22 0435)  Number of Stairs: 4 (11/21/22 3938)  Additional Factors: Non-reciprocal going up;Non-reciprocal going down (11/21/22 0929)  Assistance Level: Supervision (11/21/22 0929)  Skilled Clinical Factors: increased time, slow steady, assist needed to manage 02 line safely (11/21/22 0929)  W/C mobility:     LTG:  Long Term Goal 1: Pt to complete bed mobility with indep  Long Term Goal 2: Pt to complete transfers with indep  Long Term Goal 3: Pt to ambulate 50-150ft with LRD and Supervision - indep for 20 feet with ability to manage O2 line  Long Term Goal 4: Pt to complete 2 steps with HR and CGA  PT Treatment Time:  1.5 hrs      OCCUPATIONAL THERAPY    EVALUATION SELF CARE STATUS:  Hand Dominance: Right  Feeding: Independent (11/17/22 1011)  Grooming: Setup (11/17/22 1011)  UE Bathing: Setup (11/17/22 1011)  LE Bathing: Supervision (11/17/22 1011)  UE Dressing: Minimal assistance (11/17/22 1011)  UE Dressing Skilled Clinical Factors: Declines bra, assist pulling shirt all the the way down in the back. (11/17/22 1011)  LE Dressing: Dependent/Total (11/17/22 1011)  LE Dressing Skilled Clinical Factors: Assist with B socks, B shoes, pulling pant legs through d/t stretchy pants, and assist pulling pants up over hips. Has long handled shoe horn from [facility], was unable to utilize this date. (11/17/22 1011)  Toileting: Moderate assistance (11/17/22 1011)  Toileting Skilled Clinical Factors: Assist with pants (11/17/22 1011)  Additional Comments: Pt completed shower ADL as above.  Fatigues quickly with frequent rest breaks (11/17/22 1011)             CURRENT SELF CARE:  Feeding  Assistance Level: Modified independent (11/18/22 1241)  Grooming/Oral Hygiene  Assistance Level: Supervision (11/19/22 0946)  Skilled Clinical Factors: standing (11/19/22 0946)  Upper Extremity Bathing  Assistance Level: Supervision (11/18/22 1241)  Lower Extremity Bathing  Assistance Level: Stand by assist (11/18/22 1241)  Upper Extremity Dressing  Assistance Level: Set-up (11/19/22 0946)  Skilled Clinical Factors: To don shirt- vc's d/t IV being hooked up and not able to don shirt until unhooked by nursing (vc's/visual with pointing provided) (11/19/22 0946)  Lower Extremity Dressing  Assistance Level:  Moderate assistance (11/19/22 0946)  Skilled Clinical Factors: able to thread L leg but required max A to thread RLE over feet to mid lower leg- pt ablet to complete rest, including knee to waist pant management with SBA during standing using rolling walker (11/19/22 0946)  Putting On/Taking Off Footwear  Assistance Level: Dependent (11/21/22 1430)  Skilled Clinical Factors: dep A for sock/braces/shoes (11/21/22 1430)  Toileting  Assistance Level: Modified independent (11/21/22 1430)  Skilled Clinical Factors: Min assist to pull up pants (11/18/22 1350)  Toilet Transfers  Technique: Stand step (11/19/22 0946)  Equipment: Standard toilet (11/21/22 1430)  Assistance Level: Modified independent (11/21/22 1430)  Skilled Clinical Factors: ww level (11/21/22 1430)  Tub/Shower Transfers  Skilled Clinical Factors: Patient declined shower due to showering yesterday (11/18/22 1241)        LTG:  Eating:Discharge Goal: Independent  Oral Hygiene:Discharge Goal: Independent  Shower/Bathe self: Discharge Goal: Independent  Upper body dressing:Discharge Goal: Independent  Lower body dressing:Discharge Goal: Independent  Putting on taking off footwear:Discharge Goal: Independent   Toileting: Discharge Goal: Independent  Toilet transfer:Discharge Goal: Independent  OT Treatment Time: 1.5 hrs      SPEECH THERAPY                 Diet/Swallow:                       LTG:                COGNITION  OT: Cognition Comment: Appears WFL, has limited [place] proficiency but able to follow visual cues, makes good safety choices for mobility  SP:        RECREATIONAL THERAPY  Attendance to recreational therapy programs:    []  Pet Therapy  [] Music Therapy  [] Art Therapy    [] Recreation Therapy Group [] Support Group           Patient social interaction (mood, participation): good    Patient strengths: good family support    Patients goal: return home alone    Problems/Barriers: lives alone, will not reach mod I goal for socks/shoes, pt cannot put bilateral AFOs on herself, language barrier        1. Safety:          - Intervention / Plan:    [x]  falls protocol     [x]  PT/OT    []  SP        - Results:         2. Potential DME needs:         - Intervention / Plan:  [x]  PT/OT     [x]  Assess equipment needs/access       - Results:         3. Weakness:          - Intervention / Plan:  [x]  PT/OT      []  Other:         - Results:         4. Discharge planning needs:          - Intervention / Plan:  [x]  Weekly team conference      [x]  family training        - Results:         5.            - Intervention / Plan:          - Results:         6.            - Intervention / Plan:         - Results:         7.            - Intervention / Plan:         - Results:           Discharge Plan   Estimated Length of Stay: 12 days    Tentative Discharge date: 11/23/22      Anticipated Discharge Destination:  Home      Team recommendations:    1. Follow up Therapy :    PT  OT  RN  [facility]    2. Home Health    Other:     Equipment needed at Discharge:  Other: has all DME      Team Members Present at Conference:    Physician: Dr. Aury Velarde  : KIN Moreno LSW  RN: Ashley King RN  Physical Therapist: Mary Beth Hernandez PT  Occupational Therapist: Shantel Lozano OTR  Speech Therapist: Milind Thorne, SLP      Electronically signed by KIN Moreno LSW on 11/21/2022 at 4:59 PM

## 2022-11-21 NOTE — PROGRESS NOTES
[facility] EMERGENCY [facility] AT [place] Respiratory Therapy Evaluation   Current Order:  ALBUTEROL MDI Q6PRN      Home Regimen: NONE      Ordering Physician: MAGGIE  Re-evaluation Date:  -     Diagnosis: IMPAIRED MOBILITY      Patient Status: Stable / Unstable + Physician notified    The following MDI Criteria must be met in order to convert aerosol to MDI with spacer. If unable to meet, MDI will be converted to aerosol:  []  Patient able to demonstrate the ability to use MDI effectively  []  Patient alert and cooperative  []  Patient able to take deep breath with 5-10 second hold  []  Medication(s) available in this delivery method   []  Peak flow greater than or equal to 200 ml/min            Current Order Substituted To  (same drug, same frequency)   Aerosol to MDI [] Albuterol Sulfate 0.083% unit dose by aerosol Albuterol Sulfate MDI 2 puffs by inhalation with spacer    [] Levalbuterol 1.25 mg unit dose by aerosol Levalbuterol MDI 2 puffs by inhalation with spacer    [] Levalbuterol 0.63 mg unit dose by aerosol Levalbuterol MDI 2 puffs by inhalation with spacer    [] Ipratropium Bromide 0.02% unit dose by aerosol Ipratropium Bromide MDI 2 puffs by inhalation with spacer    [] Duoneb (Ipratropium + Albuterol) unit dose by aerosol Ipratropium MDI + Albuterol MDI 2 puffs by inhalation w/spacer   MDI to Aerosol [] Albuterol Sulfate MDI Albuterol Sulfate 0.083% unit dose by aerosol    [] Levalbuterol MDI 2 puffs by inhalation Levalbuterol 1.25 mg unit dose by aerosol    [] Ipratropium Bromide MDI by inhalation Ipratropium Bromide 0.02% unit dose by aerosol    [] Combivent (Ipratropium + Albuterol) MDI by inhalation Duoneb (Ipratropium + Albuterol) unit dose by aerosol       Treatment Assessment [Frequency/Schedule]:  Change frequency to: _________________NO CHANGES_________________________________per Protocol, P&T, MEC      Points 0 1 2 3 4   Pulmonary Status  Non-Smoker  [x]   Smoking history   < 20 pack years  []   Smoking history  ?  20 pack years  []   Pulmonary Disorder  (acute or chronic)  []   Severe or Chronic w/ Exacerbation  []     Surgical Status No [x]   Surgeries     General []   Surgery Lower []   Abdominal Thoracic or []   Upper Abdominal Thoracic with  PulmonaryDisorder  []     Chest X-ray Clear/Not  Ordered     [x]  Chronic Changes  Results Pending  []  Infiltrates, atelectasis, pleural effusion, or edema  []  Infiltrates in more than one lobe []  Infiltrate + Atelectasis, &/or pleural effusion  []    Respiratory Pattern Regular,  RR = 12-20 [x]  Increased,  RR = 21-25 []  MOLINA, irregular,  or RR = 26-30 []  Decreased FEV1  or RR = 31-35 []  Severe SOB, use  of accessory muscles, or RR ? 35  []    Mental Status Alert, oriented,  Cooperative [x]  Confused but Follows commands []  Lethargic or unable to follow commands []  Obtunded  []  Comatose  []    Breath Sounds Clear to  auscultation  3  Decreased unilaterally or  in bases only []  Decreased  bilaterally  [x]  Crackles or intermittent wheezes []  Wheezes []    Cough Strong, Spontan., & nonproductive [x]  Strong,  spontaneous, &  productive []  Weak,  Nonproductive []  Weak, productive or  with wheezes []  No spontaneous  cough or may require suctioning []    Level of Activity Ambulatory []  Ambulatory w/ Assist  [x]  Non-ambulatory []  Paraplegic []  Quadriplegic []    Total    Score:____3___     Triage Score:______5__      Tri       Triage:     1. (>20) Freq: Q3    2. (16-20) Freq: Q4   3. (11-15) Freq: QID & Albuterol Q2 PRN    4. (6-10) Freq: TID & Albuterol Q2 PRN    5. (0-5) Freq Q4prn

## 2022-11-21 NOTE — PROGRESS NOTES
Progress Note  Patient: Najma Rodriguez  Unit/Bed: I189/R742-49  YOB: 1932  MRN: [id number]  Acct: [de-identified]   Admitting Diagnosis: Impaired mobility and ADLs [Z74.09, Z78.9]  Admit Date:  11/16/2022  Hospital Day: 5    Chief Complaint: CHF    Histories:  Past Medical History:   Diagnosis Date    Ascending aortic aneurysm 6/23/2017    Charcot Arleen Tooth muscular atrophy     CHF (congestive heart failure) (HCC)     Chronic diastolic CHF (congestive heart failure) ([address] 4/1/2022    Depression     Descending aortic aneurysm ([facility] 6/23/2017    Essential hypertension 2/16/2018    Headache     HTN (hypertension)     Impaired mobility and activities of daily living     Lumbar stenosis with neurogenic claudication     Myelopathy (HCC)     Osteoarthritis      Past Surgical History:   Procedure Laterality Date    IR INS PICC VAD W SQ PORT GREATER THAN 5  9/23/2022    IR INS PICC VAD W SQ PORT GREATER THAN 5 9/23/2022 [facility] SPECIAL PROCEDURE    JOINT REPLACEMENT Bilateral     knees    OTHER SURGICAL HISTORY Right 07/21/2022    cat scan guided abdominal mass aspiration with drain placement by Dr. Lia Husain Left 02/25/2021    LEFT PLEURAL CATHETER INSERTION performed by Jazmyne Null MD at [place] [address] Left 01/29/2021    total of 755 cc removed per Dr Kristofer Johnson specimen sent to lab     Family History   Problem Relation Age of Onset    Arthritis Mother     Arthritis Father     High Blood Pressure Father      Social History     Socioeconomic History    Marital status:       Spouse name: None    Number of children: 2    Years of education: None    Highest education level: None   Tobacco Use    Smoking status: Never    Smokeless tobacco: Never   Vaping Use    Vaping Use: Never used   Substance and Sexual Activity    Alcohol use: No     Alcohol/week: 0.0 standard drinks    Drug use: No    Sexual activity: Not Currently   Social History Narrative    , Lives With: Alone, son lives down the street, dtr is in the area    [facility] of [facility]  in [address]: One level    Home Access: Stairs to enter with rails- Number of Steps: 2- Rails: Both    Bathroom Shower/Tub: Tub/Shower unit, Bathroom Equipment: Grab bars in shower, Shower chair    Home Equipment: Rolling walker, Cane(Pt infrequemtly uses DME for ambulation and prefers to furniture walk in home)    ADL Assistance: Independent, [facility] Responsibilities: Yes    Ambulation Assistance: Independent, Transfer Assistance: Independent    Additional Comments: Son stops over 2 times daily           Subjective/HPI pt seen in PT unit. Doing well overall. Little SOB today. Still w postprandial epigastric discomfort but seems better  Little dizzy when standing. No falls    EKG:        Review of Systems:   Review of Systems   Constitutional: Negative. Negative for diaphoresis and fatigue. HENT: Negative. Eyes: Negative. Respiratory: Negative. Negative for cough, chest tightness, shortness of breath, wheezing and stridor. Cardiovascular: Negative. Negative for chest pain, palpitations and leg swelling. Gastrointestinal:  Positive for abdominal pain. Negative for blood in stool and nausea. Genitourinary: Negative. Musculoskeletal:  Positive for gait problem. Skin: Negative. Neurological:  Positive for dizziness and weakness. Negative for syncope and light-headedness. Hematological: Negative. Psychiatric/Behavioral: Negative. Physical Examination:    BP (!) 152/84   Pulse 75   Temp 98.6 °F (37 °C) (Oral)   Resp 16   Ht 4' 11\" (1.499 m)   Wt 154 lb 12.8 oz (70.2 kg)   SpO2 95%   BMI 31.27 kg/m²    Physical Exam   Constitutional: She appears healthy. No distress. HENT:   Normal cephalic and Atraumatic   Eyes: Pupils are equal, round, and reactive to light. Neck: Thyroid normal. No JVD present. No neck adenopathy.  No thyromegaly present. Cardiovascular: Normal rate, regular rhythm, intact distal pulses and normal pulses. Murmur heard. Pulmonary/Chest: Effort normal. She has no rales. She has scattered wheezes. She exhibits no tenderness. Abdominal: Soft. Bowel sounds are normal. There is no abdominal tenderness. Musculoskeletal:         General: No tenderness or edema. Normal range of motion. Cervical back: Normal range of motion and neck supple. Neurological: She is alert and oriented to person, place, and time. Skin: Skin is warm. No cyanosis. Nails show no clubbing.      LABS:  CBC:   Lab Results   Component Value Date/Time    WBC 7.1 11/20/2022 03:33 PM    RBC 4.20 11/20/2022 03:33 PM    HGB 11.6 11/20/2022 03:33 PM    HCT 34.1 11/20/2022 03:33 PM    MCV 81.1 11/20/2022 03:33 PM    MCH 27.7 11/20/2022 03:33 PM    MCHC 34.2 11/20/2022 03:33 PM    RDW 17.5 11/20/2022 03:33 PM     11/20/2022 03:33 PM     CBC with Differential:    Lab Results   Component Value Date/Time    WBC 7.1 11/20/2022 03:33 PM    RBC 4.20 11/20/2022 03:33 PM    HGB 11.6 11/20/2022 03:33 PM    HCT 34.1 11/20/2022 03:33 PM     11/20/2022 03:33 PM    MCV 81.1 11/20/2022 03:33 PM    MCH 27.7 11/20/2022 03:33 PM    MCHC 34.2 11/20/2022 03:33 PM    RDW 17.5 11/20/2022 03:33 PM    BANDSPCT 2 07/24/2022 05:00 AM    METASPCT 2 07/24/2022 05:00 AM    LYMPHOPCT 13.8 11/17/2022 10:34 AM    MONOPCT 10.1 11/17/2022 10:34 AM    BASOPCT 0.7 11/17/2022 10:34 AM    MONOSABS 0.8 11/17/2022 10:34 AM    LYMPHSABS 1.0 11/17/2022 10:34 AM    EOSABS 0.3 11/17/2022 10:34 AM    BASOSABS 0.1 11/17/2022 10:34 AM     CMP:    Lab Results   Component Value Date/Time     11/20/2022 03:33 PM    K 4.1 11/20/2022 03:33 PM    K 4.1 11/20/2022 03:33 PM     11/20/2022 03:33 PM    CO2 27 11/20/2022 03:33 PM    BUN 14 11/20/2022 03:33 PM    CREATININE 0.60 11/20/2022 03:33 PM    GFRAA >60.0 10/11/2022 10:30 AM    LABGLOM >60.0 11/20/2022 03:33 PM    GLUCOSE 116 11/20/2022 03:33 PM    PROT 6.6 11/20/2022 03:33 PM    LABALBU 2.9 11/20/2022 03:33 PM    CALCIUM 8.9 11/20/2022 03:33 PM    BILITOT 0.4 11/20/2022 03:33 PM    ALKPHOS 57 11/20/2022 03:33 PM    AST 25 11/20/2022 03:33 PM    ALT 16 11/20/2022 03:33 PM     BMP:    Lab Results   Component Value Date/Time     11/20/2022 03:33 PM    K 4.1 11/20/2022 03:33 PM    K 4.1 11/20/2022 03:33 PM     11/20/2022 03:33 PM    CO2 27 11/20/2022 03:33 PM    BUN 14 11/20/2022 03:33 PM    LABALBU 2.9 11/20/2022 03:33 PM    CREATININE 0.60 11/20/2022 03:33 PM    CALCIUM 8.9 11/20/2022 03:33 PM    GFRAA >60.0 10/11/2022 10:30 AM    LABGLOM >60.0 11/20/2022 03:33 PM    GLUCOSE 116 11/20/2022 03:33 PM     Magnesium:    Lab Results   Component Value Date/Time    MG 1.8 11/21/2022 05:08 AM     Troponin:    Lab Results   Component Value Date/Time    TROPONINI <0.010 11/11/2022 12:30 PM        Active Hospital Problems    Diagnosis Date Noted    A-fib [facility]) [I48.91]      Priority: High    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00] 07/19/2022     Priority: Medium    Right lower quadrant abdominal pain [R10.31] 07/18/2022     Priority: Medium    Hypokalemia [E87.6] 07/18/2022     Priority: Medium    CH[facility] (congestive heart failure) [facility] [I50.9] 07/18/2022     Priority: Medium    Anemia [D64.9] 07/18/2022     Priority: Medium Lumbar stenosis with neurogenic claudication [M48.062] 06/02/2016     Priority: Medium    Depression [F32. A]      Priority: Low        Assessment/Plan:  chronic DHF-  She is Euvolemic. Continue PO Lasix. follow periodic labs. Echo EF 40 from prior 60. Likely related to AF.  conitnue Entresto and BB  SOB and Wheezing- we will order Albuterol inhaler   AF- Xarelto. Rate control added DIg. In SR    Ascending AO Aneurysm 5.3 cm - no plans for surgery per pt and daughter. She has Mural Thrombus. - on Xarelto  HTN stable   Abd Abscess and discomfort - on iv ABX. Continued discomfort. CAD Moderate LAD - no CP reported.    Elevated D Dimer- CT Chest - negative PE  PTOT Rehab         Electronically signed by Ari Salazar MD on 11/21/2022 at 9:41 AM

## 2022-11-21 NOTE — PROGRESS NOTES
Pt assessment completed at 2110. Pt denies SOB CP or any other pain. PICC SL RUE intact no blood return- easy flush. Pt was up to BR x3 so far this shift. Pt remain on 3L oxygen per NC-as pt wears at home. Call light within reach and bed alarm activated.

## 2022-11-21 NOTE — PROGRESS NOTES
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    Restrictions  Restrictions/Precautions  Restrictions/Precautions: Fall Risk              Patient's date of birth confirmed: Yes    SAFETY:  Safety Devices  Safety Devices in place: Yes  Type of devices: All fall risk precautions in place    SUBJECTIVE:  Subjective: \"Exercise\"  Pain: denies    Pain at start of treatment: No    Pain at end of treatment: No      COGNITION:     WFL      OBJECTIVE:         Putting On/Taking Off Footwear  Assistance Level: Dependent  Skilled Clinical Factors: dep A for sock/braces/shoes  Toileting  Assistance Level: Modified independent  Toilet Transfers  Equipment: Standard toilet  Assistance Level: Modified independent  Skilled Clinical Factors: ww level         Functional Mobility  Device: Rolling walker  Activity: To/From bathroom  Assistance Level: Modified independent     Patient stood with no assistance and placed resistive clothespins onto the base. Patient was unable to squeeze the blue and black highest level resistance levels and struggled with the medium resistance level pins. Patient appeared mildly SOB and requested a rest break. Patient's pulse ox was 99%.          Education:  Education  Education Provided Comments: none at this session    Equipment recommendations:  OT Equipment Recommendations  Other: Continue to assess      ASSESSMENT:  Assessment: Patient tolerated treatment well on this afternoon  Activity Tolerance: Patient tolerated treatment well      PLAN OF CARE:  Balance training, Functional mobility training, Endurance training, Strengthening, Pain management, Safety education & training, Patient/Caregiver education & training, Equipment evaluation, education, & procurement, Home management training, Self-Care / ADL  Continue with OT plan of care with discharge planned on 11/23/22    Patient goals : \"To go home. \"  Time Frame for Long Term Goals :  Within 1 week pt will improve in the following areas in order to reach specific goals as outlined in initial evaluation  Long Term Goal 1: Pt will improve ADL independence  Long Term Goal 2: Pt. will improve balance and safety with transfers  Long Term Goal 3: Pt will improve UE strength and endurance for ADL and transfer tasks        Therapy Time:   Individual Group Co-Treat   Time In 1300       Time Out 1330         Minutes 30                   ADL/IADL training: 10 minutes  Therapeutic activities: 20 minutes     Electronically signed by:    OSMANI Lyons/L,   11/21/2022, 2:35 PM

## 2022-11-21 NOTE — PROGRESS NOTES
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 \"  Pain: denies    Pain at start of treatment: No    Pain at end of treatment: No        COGNITION:  Orientation  Orientation Level: Unable to assess          OBJECTIVE:     Pt completed peg board task seated with SBA. Provided pt instruction to complete to complete task with starting at the top of the board and working down the board in a horizontal manner. Pt demonstrated Min difficulty d/t fatigue and hand pain therefore pt took intermittent rest breaks. Pt demonstrated slight fatigue post placing all pegs. Pt then removed pegs in the same manner that they were placed in. Pt required extended time to complete task. Pt did require vc's intermittently through task to switch arms/hands to incorporate BUE use. Pt completed task to improve with functional act tolerance, and dynamic UB movement with reaching in order to improve ease and Ind with functional tasks.        ASSESSMENT:  Assessment: Pt fatigues easily during seated activity with a good amount of UB movement, intermittent rest breaks needed  Activity Tolerance: Patient limited by fatigue      PLAN OF CARE:  Balance training, Functional mobility training, Endurance training, Strengthening, Pain management, Safety education & training, Patient/Caregiver education & training, Equipment evaluation, education, & procurement, Home management training, Self-Care / ADL  Continue with OT plan of care with discharge planned on 11/23/22    Patient goals : \"To go home. \"  Time Frame for Long Term Goals : Within 1 week pt will improve in the following areas in order to reach specific goals as outlined in initial evaluation  Long Term Goal 1: Pt will improve ADL independence  Long Term Goal 2: Pt. will improve balance and safety with transfers  Long Term Goal 3: Pt will improve UE strength and endurance for ADL and transfer tasks        Therapy Time:   Individual Group Co-Treat   Time In 1430       Time Out 1500         Minutes 30                   Neuromuscular reeducation: 15 minutes  Therapeutic activities: 15 minutes     Electronically signed by:     PRUDENCE Caruso,   11/21/2022, 4:10 PM

## 2022-11-21 NOTE — CARE COORDINATION
IV BENEFIT REQUEST FORM    FAX FROM: [facility]                        [address], [address]    REQUESTED BY: Electronically signed by KIN Armenta, JANETW on 2022 at 4:34 PM                                               RN/C3: PHONE: [phone])     DATE:/TIME OF REQUEST: 22  TIME: 4:34 PM      TO: [facility] [facility]      FAX TO: [phone]    PHONE: [phone]     THIS PATIENT HAS BEEN IDENTIFIED TO POSSIBLY NEED LONG TERM IV'S. PLEASE CHECK INSURANCE COVERAGE FOR THE FOLLOWING PT/DRUGS. PATIENT'S NAME: Kamryn Longoria                              [id number]   PATIENT'S : 1932  PATIENT ADDRESS: [address] [place] [address] [place] [address]  SSN:    ()     PAYOR NAME:  Payor: Michelle Villedapers / Plan: MEDICARE PART A AND B / Product Type: *No Product type* /     DRUG: (piperacillin-tazobactam (ZOSYN))                         DOSE: (3,375 mg in dextrose 5 % 50 mL IVPB (mini-bag))            FREQUENCY: Q (EVERY 8 HOURS @ 12.5 mL/hr over 240 Minutes) HR        __________ CHECK HERE IF PT HAS NO INSURANCE AND REQUESTING SELF PAY COST. *IF [facility] IS NOT A PROVIDER FOR THIS PATIENT, PLEASE FORWARD INFO VIA FAX TO [facility] @ [phone],(PHONE NUMBER: [phone]) TO RUN BENEFIT VERIFICATION AND NOTIFY THE ABOVE C3 OF THIS PLAN. (FAX FACE SHEET WITH DEMOGRAPHICS AND INSURANCE INFO WITH THIS FORM.)  PLEASE FAX BENEFIT INFO TO: THE [phone]    This message is intended only for the use of the individual or entity to which it is addressed and may contain information that is privileged, confidential, and exempt from disclosure under applicable law.  If the reader of the notice is not intended recipient of the employee/agent responsible for delivering the message to the intended recipient, you are hereby notified than any dissemination, distribution or copying of this communication is strictly prohibited. Please contact the sender for further instructions on handling the information.           Electronically signed by KIN Gagnon LSW on 11/21/2022 at 4:36 PM

## 2022-11-21 NOTE — PROGRESS NOTES
Physical Therapy Rehab Treatment Note  [facility]  Room: O761/X150-82       NAME: Joey Turner  : 1932 (80 y.o.)  MRN: [id number]  CODE STATUS: Full Code    Date of Service: 2022       Restrictions:  Restrictions/Precautions: Fall Risk       SUBJECTIVE:   Subjective: I feel ok. Just tired. Pain  Pain: denies pre and post pain      OBJECTIVE:                 Transfers  Surface: From bed; Wheelchair; To chair with arms;From chair with arms  Device: Walker  Sit to Stand  Assistance Level: Modified independent  Stand to Sit  Assistance Level: Modified independent  Car Transfer  Assistance Level: Supervision  Skilled Clinical Factors: Improved ability to problem solve with getting out or car for hand placement. Ambulation  Surface: Carpet  Device: Rolling walker  Distance: 50' x 2  Activity: Within Unit  Additional Factors: Right AFO; Left AFO; Increased time to complete  Assistance Level: Modified independent;Supervision  Gait Deviations: Slow raji  Skilled Clinical Factors: Steady gait. Neuromuscular Education  Neuromuscular Comments: obsticle course, several turns 25 feet x 1 with pt able to manage O2 cord independently. Balance  Sitting Balance: Modified independent     PT Exercises  A/AROM Exercises: Seated: Side kicks, marching, LAQ, AP x 20  Resistive Exercises: MRE'S: ABD/ADD x 20          Activity Tolerance  Activity Tolerance: Patient tolerated treatment well             ASSESSMENT/PROGRESS TOWARDS GOALS:   Assessment  Assessment: Pt able to complete car transfer independently and demonstrated good carry over with management of O2 cord around obstacles.   Pt without c/o dizziness in PM.  Activity Tolerance: Patient tolerated treatment well  Discharge Recommendations: Continue to assess pending progress    Goals:  Long Term Goals  Long Term Goal 1: Pt to complete bed mobility with indep  Long Term Goal 2: Pt to complete transfers with indep  Long Term Goal 3: Pt to ambulate 50-150ft with LRD and Supervision - indep for 20 feet with ability to manage O2 line  Long Term Goal 4: Pt to complete 2 steps with HR and CGA    PLAN OF CARE/Safety:   Safety Devices  Type of Devices: Chair alarm in place; Left in chair; All fall risk precautions in place      Therapy Time:   Individual   Time In 1400   Time Out 1430   Minutes 30     Minutes:30  Transfer/Bed mobility trainin  Gait trainin  Neuro re education: 15  Therapeutic ex: C/ [address], PTA, 22 at 3:51 PM

## 2022-11-21 NOTE — PLAN OF CARE
Problem: Discharge Planning  Goal: Discharge to home or other facility with appropriate resources  11/20/2022 2254 by Anne Gibson RN  Outcome: Progressing  Flowsheets (Taken 11/20/2022 2110)  Discharge to home or other facility with appropriate resources: Identify barriers to discharge with patient and caregiver  11/20/2022 1143 by Alvaro Gagnon RN  Outcome: Progressing  Flowsheets (Taken 11/20/2022 1137)  Discharge to home or other facility with appropriate resources: Identify barriers to discharge with patient and caregiver

## 2022-11-21 NOTE — PROGRESS NOTES
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 Dizzy. Pain: denies    Pain at start of treatment: No    Pain at end of treatment: No      Nursing notified: Yes  RN: Opal        OBJECTIVE:     Pt arrived in [address] [place] [address] stating she felt dizzy. BP taken 94/63. Pt continued to state she did not feel good \"In the head. \" ADDIE Giordano notified and pt returned to room. Pt returned bed level completing functional t/f and bed mobility at the below level. Sit to Supine  Assistance Level: Supervision  Bed To/From Chair  Technique: Stand step  Assistance Level: Stand by assist           Equipment recommendations:  OT Equipment Recommendations  Other: Continue to assess      ASSESSMENT:  Assessment: low BP 94/63  Activity Tolerance: Treatment limited secondary to medical complications      PLAN OF CARE:  Balance training, Functional mobility training, Endurance training, Strengthening, Pain management, Safety education & training, Patient/Caregiver education & training, Equipment evaluation, education, & procurement, Home management training, Self-Care / ADL  Continue with OT plan of care    Patient goals : \"To go home. \"  Time Frame for Long Term Goals :  Within 1 week pt will improve in the following areas in order to reach specific goals as outlined in initial evaluation  Long Term Goal 1: Pt will improve ADL independence  Long Term Goal 2: Pt. will improve balance and safety with transfers  Long Term Goal 3: Pt will improve UE strength and endurance for ADL and transfer tasks        Therapy Time:   Individual Group Co-Treat   Time In 1030       Time Out 1042         Minutes 12                   ADL/IADL trainin minutes  Missed 48 minutes due to low BP and symptomatic, will attempt makeup as able     Electronically signed by:    Kari Mcmahon OT,   2022, 11:17 AM

## 2022-11-21 NOTE — PROGRESS NOTES
Physical Therapy Rehab Treatment Note  [facility]Department[facility]  [facility]       NAME: Tonny Frazier  : 1932 (80 y.o.)  MRN: [id number]  CODE STATUS: Full Code    Date of Service: 2022       Restrictions:  Restrictions/Precautions: Fall Risk       SUBJECTIVE:   Subjective: Pt c/o dizziness with sit to supine. Pain  Pain: denies pre and post pain      OBJECTIVE:             Bed Mobility  Additional Factors: Head of bed flat; Without handrails  Roll Left  Assistance Level: Modified independent  Roll Right  Assistance Level: Modified independent  Sit to Supine  Assistance Level: Modified independent  Supine to Sit  Assistance Level: Modified independent  Scooting  Assistance Level: Modified independent    Transfers  Surface: From bed; Wheelchair; To chair with arms;From chair with arms  Device: Walker  Sit to Stand  Assistance Level: Modified independent  Stand to Sit  Assistance Level: Modified independent  Bed To/From Chair  Technique: Stand step  Assistance Level: Modified independent    Ambulation  Surface: Carpet  Device: Rolling walker  Distance: 50' x 1, 40' x 2 with turns; 76' x 1  Activity: Within Unit  Additional Factors: Right AFO; Left AFO; Increased time to complete  Assistance Level: Supervision  Gait Deviations: Slow raji  Skilled Clinical Factors: Steady gait. Stairs  Stair Height: 6''  Device: Bilateral handrails  Number of Stairs: 4  Additional Factors: Non-reciprocal going up;Non-reciprocal going down  Assistance Level: Supervision  Skilled Clinical Factors: increased time, slow steady, assist needed to manage 02 line safely         Balance  Sitting Balance: Modified independent     PT Exercises  A/AROM Exercises: Seated: Side kicks, marching, LAQ, AP x 20, Ball ADD x 20; ABD with RTB x 20; Activity Tolerance  Activity Tolerance: Treatment limited secondary to medical complications; Patient limited by fatigue             ASSESSMENT/PROGRESS TOWARDS GOALS:   Assessment  Assessment: Pt with increased c/o dizziness and fatigue this PM.  Dizziness quickly subsiding and BP not taken. Pt c/o not feeling well towards end of session. Dr Bryan Hand notified of pt's complaints, as doctor came by to talk to patient towards end of session. Pt on IV, so unable to test management of O2 cord. Pt has met goals #1, 2 and 3,  Gait at 50' - 75', at best, tolerated secondary to fatigue. Activity Tolerance: Treatment limited secondary to medical complications; Patient limited by fatigue  Discharge Recommendations: Continue to assess pending progress    Goals:  Long Term Goals  Long Term Goal 1: Pt to complete bed mobility with indep  Long Term Goal 2: Pt to complete transfers with indep  Long Term Goal 3: Pt to ambulate 50-150ft with LRD and Supervision - indep for 20 feet with ability to manage O2 line  Long Term Goal 4: Pt to complete 2 steps with HR and CGA    PLAN OF CARE/Safety:   Safety Devices  Type of Devices: Chair alarm in place; Left in chair; All fall risk precautions in place      Therapy Time:   Individual   Time In 0900   Time Out 1000   Minutes 60     Minutes:60  Transfer/Bed mobility training: 10  Gait trainin  Neuro re education: 0  Therapeutic ex: [address], PTA, 22 at 11:47 AM

## 2022-11-22 LAB — MAGNESIUM: 1.7 MG/DL (ref 1.7–2.4)

## 2022-11-22 PROCEDURE — 36415 COLL VENOUS BLD VENIPUNCTURE: CPT

## 2022-11-22 PROCEDURE — 6370000000 HC RX 637 (ALT 250 FOR IP): Performed by: PHYSICAL MEDICINE & REHABILITATION

## 2022-11-22 PROCEDURE — 6360000002 HC RX W HCPCS: Performed by: INTERNAL MEDICINE

## 2022-11-22 PROCEDURE — 99232 SBSQ HOSP IP/OBS MODERATE 35: CPT | Performed by: INTERNAL MEDICINE

## 2022-11-22 PROCEDURE — 1180000000 HC REHAB R&B

## 2022-11-22 PROCEDURE — 97116 GAIT TRAINING THERAPY: CPT

## 2022-11-22 PROCEDURE — 83735 ASSAY OF MAGNESIUM: CPT

## 2022-11-22 PROCEDURE — 97535 SELF CARE MNGMENT TRAINING: CPT

## 2022-11-22 PROCEDURE — 97530 THERAPEUTIC ACTIVITIES: CPT

## 2022-11-22 PROCEDURE — 6370000000 HC RX 637 (ALT 250 FOR IP): Performed by: INTERNAL MEDICINE

## 2022-11-22 PROCEDURE — 2700000000 HC OXYGEN THERAPY PER DAY

## 2022-11-22 PROCEDURE — 97110 THERAPEUTIC EXERCISES: CPT

## 2022-11-22 PROCEDURE — 2580000003 HC RX 258: Performed by: INTERNAL MEDICINE

## 2022-11-22 PROCEDURE — 97112 NEUROMUSCULAR REEDUCATION: CPT

## 2022-11-22 RX ADMIN — FUROSEMIDE 40 MG: 40 TABLET ORAL at 08:23

## 2022-11-22 RX ADMIN — LACTOBACILLUS TAB 4 TABLET: TAB at 21:21

## 2022-11-22 RX ADMIN — CARVEDILOL 6.25 MG: 6.25 TABLET, FILM COATED ORAL at 20:10

## 2022-11-22 RX ADMIN — PIPERACILLIN AND TAZOBACTAM 3375 MG: 3; .375 INJECTION, POWDER, FOR SOLUTION INTRAVENOUS at 21:27

## 2022-11-22 RX ADMIN — LACTOBACILLUS TAB 4 TABLET: TAB at 08:22

## 2022-11-22 RX ADMIN — POTASSIUM CHLORIDE 20 MEQ: 1500 TABLET, EXTENDED RELEASE ORAL at 08:23

## 2022-11-22 RX ADMIN — LACTOBACILLUS TAB 4 TABLET: TAB at 14:00

## 2022-11-22 RX ADMIN — SUCRALFATE 1 G: 1 TABLET ORAL at 05:24

## 2022-11-22 RX ADMIN — PIPERACILLIN AND TAZOBACTAM 3375 MG: 3; .375 INJECTION, POWDER, FOR SOLUTION INTRAVENOUS at 14:02

## 2022-11-22 RX ADMIN — PANTOPRAZOLE SODIUM 40 MG: 40 TABLET, DELAYED RELEASE ORAL at 08:23

## 2022-11-22 RX ADMIN — SUCRALFATE 1 G: 1 TABLET ORAL at 11:50

## 2022-11-22 RX ADMIN — Medication 10 ML: at 08:25

## 2022-11-22 RX ADMIN — FERROUS SULFATE TAB 325 MG (65 MG ELEMENTAL FE) 325 MG: 325 (65 FE) TAB at 16:59

## 2022-11-22 RX ADMIN — RIVAROXABAN 15 MG: 15 TABLET, FILM COATED ORAL at 08:23

## 2022-11-22 RX ADMIN — FERROUS SULFATE TAB 325 MG (65 MG ELEMENTAL FE) 325 MG: 325 (65 FE) TAB at 08:23

## 2022-11-22 RX ADMIN — Medication 2000 UNITS: at 16:59

## 2022-11-22 RX ADMIN — CARVEDILOL 6.25 MG: 6.25 TABLET, FILM COATED ORAL at 08:23

## 2022-11-22 RX ADMIN — SACUBITRIL AND VALSARTAN 1 TABLET: 24; 26 TABLET, FILM COATED ORAL at 08:23

## 2022-11-22 RX ADMIN — POTASSIUM CHLORIDE 20 MEQ: 1500 TABLET, EXTENDED RELEASE ORAL at 21:21

## 2022-11-22 RX ADMIN — Medication 100 MG: at 08:22

## 2022-11-22 RX ADMIN — SUCRALFATE 1 G: 1 TABLET ORAL at 16:59

## 2022-11-22 RX ADMIN — SACUBITRIL AND VALSARTAN 1 TABLET: 24; 26 TABLET, FILM COATED ORAL at 21:21

## 2022-11-22 RX ADMIN — Medication 10 ML: at 21:23

## 2022-11-22 RX ADMIN — LEVOTHYROXINE SODIUM 88 MCG: 88 TABLET ORAL at 05:24

## 2022-11-22 RX ADMIN — PIPERACILLIN AND TAZOBACTAM 3375 MG: 3; .375 INJECTION, POWDER, FOR SOLUTION INTRAVENOUS at 05:27

## 2022-11-22 RX ADMIN — DIGOXIN 125 MCG: 125 TABLET ORAL at 08:23

## 2022-11-22 RX ADMIN — CITALOPRAM HYDROBROMIDE 20 MG: 20 TABLET ORAL at 08:23

## 2022-11-22 ASSESSMENT — ENCOUNTER SYMPTOMS
RESPIRATORY NEGATIVE: 1
GASTROINTESTINAL NEGATIVE: 1

## 2022-11-22 ASSESSMENT — PAIN SCALES - GENERAL: PAINLEVEL_OUTOF10: 0

## 2022-11-22 NOTE — PROGRESS NOTES
[facility] Occupational Therapy      Date: 2022  Patient Name: Martha Lund        MRN: [id number]  Account: [de-identified]   : 1932  (80 y.o.)  Room: [place] [address]    Chart reviewed, attempted OT at 740 for get up and go. Patient not seen 2° to:    Pt. declined, stating: she was comfortable and eating breakfast    Will attempt again when able.     Electronically signed by Dianna Walden OT on 2022 at 8:20 AM

## 2022-11-22 NOTE — PROGRESS NOTES
Physical Therapy Rehab Treatment Note  Facility/Department: [facility]  Room: A296/Y525-57       NAME: Shantelle Malone  : 1932 (80 y.o.)  MRN: [id number]  CODE STATUS: Full Code    Date of Service: 2022       Restrictions:  Restrictions/Precautions: Fall Risk       SUBJECTIVE:   Subjective: I'm tired    Pain  Pain: denies pre and post pain      OBJECTIVE:               Transfers  Surface: To chair with arms;From chair with arms; Wheelchair  Device: Walker  Sit to [employer] Level: Modified independent  Stand to [employer] Level: Modified independent  Bed To/From Chair  Assistance Level: Modified independent  Car Transfer  Assistance Level: Modified independent    Ambulation  Surface: Carpet  Device: Rolling walker  Distance: 50' x 2  Activity: Within Unit  Additional Factors: Right AFO; Left AFO; Increased time to complete  Assistance Level: Modified independent;Supervision  Gait Deviations: Slow raji  Skilled Clinical Factors: Mild unsteadiness this PM but no LOB    Stairs  Number of Stairs: 4  Additional Factors: Non-reciprocal going up;Non-reciprocal going down  Assistance Level: Supervision  Skilled Clinical Factors: increased time, slow steady  Curb  Curb Height: 2''  Device: Rolling walker  Number of Curbs: 1  Additional Factors: Verbal cues; Non-reciprocal going up;Non-reciprocal going down  Assistance Level: Stand by assist;Contact guard assist         Neuromuscular Education  NDT Treatment: Gait   Facilitation techniques: Managing O2 cord around obstacles x 2. Pt given ideas for safer management once.   Balance  Sitting Balance: Modified independent   Standing Balance: Modified independent     PT Exercises  A/AROM Exercises: Seated: Side kicks, marching, LAQ, x 20  ADD with Ball, ABD with RTB, HS curls with RTB x 20  Resistive Exercises: MRE'S: ABD/ADD x 20          Activity Tolerance  Activity Tolerance: Patient tolerated treatment well    Education Provided: Transfer Training; Safety  Education  Education Given To: Patient  Education Provided: Transfer Training; Safety  Education Method: Verbal        ASSESSMENT/PROGRESS TOWARDS GOALS:   Assessment  Assessment: Able to complete rest of tasks in PM.  Pt appeared to be more fatigued in PM with longer rest breaks needed. Pt placed on IV over half way through treatment and limited to exercises only. Pt with headache and wanting to return to bed.   Activity Tolerance: Patient tolerated treatment well  Discharge Recommendations: Continue to assess pending progress;Home with Home health PT    Goals:  Long Term Goals  Long Term Goal 1: Pt to complete bed mobility with indep - met  Long Term Goal 2: Pt to complete transfers with indep - met  Long Term Goal 3: Pt to ambulate 50-150ft with LRD and Supervision - indep for 20 feet with ability to manage O2 line - met  Long Term Goal 4: Pt to complete 2 steps with HR and CGA - met    PLAN OF CARE/Safety:   Safety Devices  Type of Devices: Left in chair;Chair alarm in place      Therapy Time:   Individual   Time In 1330   Time Out 1430   Minutes 60     Minutes:60  Transfer/Bed mobility training: 15  Gait training: 15  Neuro re education: 15  Therapeutic ex: Cayetano Sanders PTA, 11/22/22 at 4:04 PM

## 2022-11-22 NOTE — PROGRESS NOTES
Physical Therapy Rehab Treatment Note  Facility/Department: [facility]  Room: S353/D783-93       NAME: Miladis Small  : 1932 (80 y.o.)  MRN: [id number]  CODE STATUS: Full Code    Date of Service: 2022       Restrictions:  Restrictions/Precautions: Fall Risk       SUBJECTIVE: What time do I go home tomorrow? Pain   0/10 pre and post treatment. OBJECTIVE:             Bed Mobility  Additional Factors: Head of bed flat; With handrails  Roll Left  Assistance Level: Modified independent  Roll Right  Assistance Level: Modified independent  Sit to Supine  Assistance Level: Modified independent  Supine to Sit  Assistance Level: Modified independent  Scooting  Assistance Level: Modified independent    Transfers  Surface: To bed;From bed; Wheelchair  Device: Walker  Sit to [employer] Level: Modified independent  Stand to [employer] Level: Modified independent  Bed To/From Chair  Assistance Level: Modified independent    Ambulation  Surface: Carpet  Device: Rolling walker  Distance: 150' with turns  Activity: Within Unit  Additional Factors: Right AFO; Left AFO; Increased time to complete (3L O2)  Assistance Level: Modified independent;Supervision  Gait Deviations: Slow raji  Skilled Clinical Factors: Steady gait. Balance  Sitting Balance: Modified independent   Standing Balance: Modified independent     PT Exercises  A/AROM Exercises: Seated: Side kicks, marching, LAQ, x 20  Resistive Exercises: MRE'S: ABD/ADD x 20          Activity Tolerance  Activity Tolerance: Patient tolerated treatment well    Education Provided: Transfer Training; Safety  Education  Education Given To: Patient  Education Provided: Transfer Training; Safety  Education Method: Verbal        ASSESSMENT/PROGRESS TOWARDS GOALS:   Assessment  Assessment: Pt able to meet bed mobility goal and gait up to 150'. Will address rest of goals in PM secondary to time.   Activity Tolerance: Patient tolerated treatment well  Discharge Recommendations: Continue to assess pending progress;Home with Home health PT    Goals:  Long Term Goals  Long Term Goal 1: Pt to complete bed mobility with indep  Long Term Goal 2: Pt to complete transfers with indep  Long Term Goal 3: Pt to ambulate 50-150ft with LRD and Supervision - indep for 20 feet with ability to manage O2 line  Long Term Goal 4: Pt to complete 2 steps with HR and CGA    PLAN OF CARE/Safety:   Safety Devices  Type of Devices: Left in chair;Chair alarm in place      Therapy Time:   Individual   Time In 0930   Time Out 1000   Minutes 30     Minutes:30  Transfer/Bed mobility training: 10  Gait training: 10  Neuro re education: 2  Therapeutic ex: [address] Y[address]aira Boyd PTA, 11/22/22 at 12:44 PM

## 2022-11-22 NOTE — PROGRESS NOTES
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    Restrictions  Restrictions/Precautions  Restrictions/Precautions: Fall Risk              Patient's date of birth confirmed: Yes    SAFETY:  Safety Devices  Safety Devices in place: Yes  Type of devices: All fall risk precautions in place    SUBJECTIVE:  Subjective: \"Thank you girl\"    Pain at start of treatment: No    Pain at end of treatment: No    COGNITION:   WFL    OBJECTIVE:         Feeding  Assistance Level: Modified independent  Grooming/Oral Hygiene  Assistance Level: Modified independent  Upper Extremity Bathing  Assistance Level: Modified independent  Lower Extremity Bathing  Assistance Level: Modified independent  Upper Extremity Dressing  Assistance Level: Modified independent  Lower Extremity Dressing  Assistance Level: Supervision  Skilled Clinical Factors: Supervision provided when patient bent over to reach for her pants which were on the floor after she placed her right foot in. Putting On/Taking Off Footwear  Assistance Level: Dependent  Skilled Clinical Factors: Patient indicated with gestures that at home she has a lower chair and a stool she puts her feet on when she does her shoes, braces and socks. Unable to replicate home situation and provided dependent assist  Tub/Shower Transfers  Type: Shower  Transfer To:  Shower chair with back  Assistance Level: Supervision         Functional Mobility  Device: Rolling walker  Activity: To/From bathroom  Assistance Level: Modified independent  Sit to Stand  Assistance Level: Modified independent  Stand to Sit  Assistance Level: Modified independent    Patient had difficulty with nuts and bolts task requiring her to remove the nut from the bolt. Patient noted to appear upset with shaking her hands in the air. Task was changed and patient completed stretching using the B lois on the table    Education:  Education  Education Provided Comments: none at this session    Equipment recommendations:   none      ASSESSMENT:   Patient tolerated the session well      PLAN OF CARE:  Balance training, Functional mobility training, Endurance training, Strengthening, Pain management, Safety education & training, Patient/Caregiver education & training, Equipment evaluation, education, & procurement, Home management training, Self-Care / ADL  Continue with OT plan of care with discharge planned on 22    Patient goals : \"To go home. \"  Time Frame for Long Term Goals :  Within 1 week pt will improve in the following areas in order to reach specific goals as outlined in initial evaluation  Long Term Goal 1: Pt will improve ADL independence  Long Term Goal 2: Pt. will improve balance and safety with transfers  Long Term Goal 3: Pt will improve UE strength and endurance for ADL and transfer tasks        Therapy Time:   Individual Group Co-Treat   Time In 1030       Time Out 1130         Minutes 60                   ADL/IADL trainin minutes  Therapeutic activities: 15 minutes     Electronically signed by:    SERAFIN Pratt,   2022, 1:05 PM

## 2022-11-22 NOTE — PROGRESS NOTES
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 Pain: denies    Pain at start of treatment: No    Pain at end of treatment: No      OBJECTIVE:     Pt agreeable to make up time. Pt completed bed mobility at the below level. Pt sat EOB with G balance for 15 min. Pt completed therapeutic activities to increase BUE strength/endurance for functional tasks. Pt folded towels x10 with G balance noted with task. Sit to Supine  Assistance Level: Modified independent  Supine to Sit  Assistance Level: Modified independent           Equipment recommendations:  OT Equipment Recommendations  Other: Continue to assess      ASSESSMENT:  Activity Tolerance: Patient tolerated treatment well      PLAN OF CARE:  Balance training, Functional mobility training, Endurance training, Strengthening, Pain management, Safety education & training, Patient/Caregiver education & training, Equipment evaluation, education, & procurement, Home management training, Self-Care / ADL  Continue with OT plan of care with discharge planned on 22    Patient goals : \"To go home. \"  Time Frame for Long Term Goals :  Within 1 week pt will improve in the following areas in order to reach specific goals as outlined in initial evaluation  Long Term Goal 1: Pt will improve ADL independence  Long Term Goal 2: Pt. will improve balance and safety with transfers  Long Term Goal 3: Pt will improve UE strength and endurance for ADL and transfer tasks        Therapy Time:   Individual Group Co-Treat   Time In 1550       Time Out 1605         Minutes 15                   Therapeutic activities: 15 minutes   Patient participated in above treatment session to complete previously scheduled minutes from 11-21-22.    Electronically signed by:    Buffy De La Cruz OT,   11/22/2022, 4:10 PM

## 2022-11-22 NOTE — PROGRESS NOTES
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    Restrictions  Restrictions/Precautions  Restrictions/Precautions: Fall Risk              Patient's date of birth confirmed: Yes    SAFETY:  Safety Devices  Safety Devices in place: Yes  Type of devices: All fall risk precautions in place    SUBJECTIVE:  Subjective: \"Thank you girl\"    Pain at start of treatment: No    Pain at end of treatment: No      COGNITION:   WFL    OBJECTIVE:       Toileting  Assistance Level: Modified independent  Toilet Transfers  Equipment: Standard toilet  Assistance Level: Modified independent  Skilled Clinical Factors: ww level  Functional Mobility  Device: Rolling walker  Activity: To/From bathroom  Assistance Level: Modified independent  Sit to Stand  Assistance Level: Modified independent  Stand to Sit  Assistance Level: Modified independent     Patient had difficulty with manipulating small pegs in order to place them into the peg board using B hands.  Difficult to understand if patient has decreased feeling or decreased or both    Education:  Education  Education Provided Comments: none at this session    Equipment recommendations:  OT Equipment Recommendations  Other: Continue to assess      ASSESSMENT:  Assessment: Patient did well with toileting  Activity Tolerance: Patient tolerated treatment well      PLAN OF CARE:  Balance training, Functional mobility training, Endurance training, Strengthening, Pain management, Safety education & training, Patient/Caregiver education & training, Equipment evaluation, education, & procurement, Home management training, Self-Care / ADL  Continue with OT plan of care with discharge planned on

## 2022-11-22 NOTE — PROGRESS NOTES
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    Past Medical History:   Diagnosis Date    Ascending aortic aneurysm 2017    Charcot Arleen Tooth muscular atrophy     CHF (congestive heart failure) (formerly [facility])     Chronic diastolic CHF (congestive heart failure) ([address] 2022    Depression     Descending aortic aneurysm ([facility] 2017    Essential hypertension 2018    Headache     HTN (hypertension)     Impaired mobility and activities of daily living     Lumbar stenosis with neurogenic claudication     Myelopathy ([facility]     Osteoarthritis      Past Surgical History:   Procedure Laterality Date    IR INS PICC VAD W SQ PORT GREATER THAN 5  2022    IR INS PICC VAD W SQ PORT GREATER THAN 5 2022 [facility] SPECIAL PROCEDURE    JOINT REPLACEMENT Bilateral     knees    OTHER SURGICAL HISTORY Right 2022    cat scan guided abdominal mass aspiration with drain placement by Dr. Layne Lung Left 2021    LEFT PLEURAL CATHETER INSERTION performed by Jaida Noriega MD at [place] [id number] Left 2021    total of 755 cc removed per Dr Hugo Patient specimen sent to lab       Precautions:   Restrictions/Precautions: Fall Risk     Social/Functional History:  Social/Functional History  Lives With: Alone  Type of Home: House  Home Layout: One level  Home Access: Stairs to enter with rails  Entrance Stairs - Number of Steps: 2  Entrance Stairs - Rails: Both  Bathroom Shower/Tub: Tub/Shower unit  Bathroom Equipment: Grab bars in shower  Home Equipment: Cane (B AFOs)  Has the patient had two or more falls in the past year or any fall with injury in the past year?: No  ADL Assistance: Independent  Homemaking Assistance:  (Son assists with shopping, family checks on daily)  Ambulation Assistance: Independent (Cane  and B AFOs)  Transfer Assistance: Independent  Active : No  Patient's  Info: family - son lives close by; dtr lives in [place]  Mode of Transportation: [facility]  Education: no  Occupation: Retired  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 Pt is [place] speaking but dialect unavailable through [address] ; family not present today    Current Functional Status:  ADL  Feeding: Independent  Grooming: Independent  UE Bathing: Independent  LE Bathing: Independent  UE Dressing: Independent    LE Dressing: Independent for pants. LE Dressing Skilled Clinical Factors: Patient needed assist with socks, shoes and braces  Toileting: Modified independent   Toilet Transfers  Toilet - Technique: Ambulating  Equipment Used: Standard toilet  Toilet Transfer: Modified independent     Shower Transfers  Shower - Transfer From: [facility] - Transfer Type: To and From  Shower - Transfer To:  Shower seat with back  Shower - Technique: Ambulating  Shower Transfers: Supervision      Orientation Status:  Orientation  Overall Orientation Status: Within Functional Limits    Cognition Status:  Cognition  Overall Cognitive Status: WFL  Cognition Comment: Appears WFL, has limited [place] proficiency but able to follow visual cues, makes good safety choices for mobility    Perception Status:  Perception  Overall Perceptual Status: WFL    Sensation Status:  Sensation  Overall Sensation Status: [facility] (As able to assess)    Vision and Hearing Status:  Hearing  Hearing: Exceptions to [facility]  Hearing Exceptions: Hard of hearing/hearing concerns, No hearing aid     UE Function Status:    ROM:   LUE AROM (degrees)  LUE AROM : WFL  LUE General AROM: Arthritic changes throughout  Left Hand AROM (degrees)  Left Hand AROM: WFL  Left Hand General AROM: Arthritic changes throughout  RUE AROM (degrees)  RUE AROM : WFL  RUE General AROM: Arthritic changes throughout  Right Hand AROM (degrees)  Right Hand AROM: WFL  Right Hand General AROM: Arthritic changes throughout    Strength:  Overall decreased     Coordination, Tone, Quality of Movement:    Tone RUE  RUE Tone: Normotonic  Tone LUE  LUE Tone: Normotonic  Coordination  Movements Are Fluid And Coordinated: No  Coordination and Movement Description: Fine motor impairments, Decreased speed, Decreased accuracy, Right UE, Left UE  Quality of Movement Other  Comment: Fine motor deficits d/t arthritic changes in hands, difficulty d/t coordination deficits    D/C Recommendations:    Equipment Recommendations:   Patient has all needed equipment    OT Follow Up:  OT D/C RECOMMENDATIONS  REQUIRES OT FOLLOW-UP: Yes    Home Exercise Program Provided: [] Yes [x] No Due to language barrier    Electronically signed by:    OSMANI Card/L,    11/22/2022, 2:16 PM

## 2022-11-22 NOTE — PROGRESS NOTES
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 Other complicating issues include intra-abdominal abscess and is getting IV Invanz. RN  Signed                Pt assessment completed at 2110. Pt denies SOB CP or any other pain. PICC SL RUE intact no blood return- easy flush. Pt was up to BR x3 so far this shift. Pt remain on 3L oxygen per NC-as pt wears at home. Call light within reach and bed alarm activated           ROS x10: The patient also complains of severely impaired mobility and activities of daily living. Otherwise no new problems with vision, hearing, nose, mouth, throat, dermal, cardiovascular, GI, , pulmonary, musculoskeletal, psychiatric or neurological. See also Acute Rehab PM&R H&P. Vital signs:  BP (!) 152/84   Pulse 75   Temp 98.6 °F (37 °C) (Oral)   Resp 16   Ht 4' 11\" (1.499 m)   Wt 154 lb 12.8 oz (70.2 kg)   SpO2 95%   BMI 31.27 kg/m²   I/O:   PO/Intake:  fair PO intake,  reg diet low salt    Bowel:   continent   Bladder: continent   no taylor  General:  Patient is well developed,   adequately nourished, and    well kempt. HEENT:    Pupils equal, hearing intact to loud voice, external inspection of ear and nose benign. Inspection of lips, tongue and gums benign    Musculoskeletal: No significant change in strength or tone. All joints stable. Inspection and palpation of digits and nails show no clubbing, cyanosis or inflammatory conditions. Neuro/Psychiatric: Affect: flat but pleasant. Alert and oriented to person, place and situation with min cues. No significant change in deep tendon reflexes or sensation  Lungs:  Diminished, CTA-B.  Respiration effort is   normal at rest.     Heart:   S1 = S2,   RRR. Abdomen:  Soft,  generalized mostly epigastric-tender, no enlargement of liver or spleen. Extremities:   mild lower extremity edema    Skin:   Intact to general survey,  BUE bruises, old rlq drain site --no ss drainage    Rehabilitation:  Physical Therapy:   Bed mobility:  Bed mobility  Bridging: Independent (11/17/22 1018)  Rolling to Left: Modified independent (11/18/22 1007)  Rolling to Right: Modified independent (11/18/22 1007)  Supine to Sit: Minimal assistance (11/18/22 1026)  Sit to Supine: Modified independent (11/18/22 1007)  Scooting: Stand by assistance (11/17/22 1018)  Bed Mobility Comments: Pt requires increased time and effort. VCs for positioning once supine. Increased effort with sup to sit. Pt reports dizziness with initial sup to sit. (11/18/22 1026)  Bed Mobility  Overall Assistance Level: Supervision;Modified Independent (11/19/22 1024)  Additional Factors: Head of bed flat; Without handrails (11/21/22 0928)  Overall Assistance Level: Supervision;Modified Independent (11/19/22 1024)  Additional Factors: Head of bed flat; Without handrails (11/21/22 0928)  Roll Left  Assistance Level: Modified independent (11/21/22 0928)  Roll Right  Assistance Level: Modified independent (11/21/22 0928)  Sit to Supine  Assistance Level: Modified independent (11/21/22 0928)  Skilled Clinical Factors: on mat with rest break required after (11/19/22 1024)  Supine to Sit  Assistance Level: Modified independent (11/21/22 0928)  Skilled Clinical Factors: uses valsalva, cues to breathe through exertion (11/19/22 1024)  Scooting  Assistance Level: Modified independent (11/21/22 0928)  Transfers:  Transfers  Sit to Stand: Supervision (11/18/22 1019)  Stand to Sit: Supervision (11/18/22 1019)  Bed to Chair: Stand by assistance (11/17/22 0948)  Comment: toilet transfers SBA - cues for safety with O2 line management and reaching for surface in too great a distance (11/17/22 5966)  Transfers  Surface: From bed; Wheelchair; To chair with arms;From chair with arms (11/21/22 5107)  Additional Factors: Hand placement cues (11/19/22 1024)  Device: Trang Mowers (11/21/22 0928)  Sit to Stand  Assistance Level: Modified independent (11/21/22 0928)  Skilled Clinical Factors: uses safe and consistent hand placement (11/19/22 1320)  Stand to Sit  Assistance Level: Modified independent (11/21/22 0928)  Skilled Clinical Factors: patient needs cues for safe approach to chair with fatigue (11/19/22 1320)  Bed To/From Chair  Technique: Stand step (11/21/22 0928)  Assistance Level: Modified independent (11/21/22 0928)  Car Transfer  Assistance Level: Supervision (11/21/22 1421)  Skilled Clinical Factors: Improved ability to problem solve with getting out or car for hand placement. (11/21/22 1421)  Gait:   Ambulation  Surface: Level tile (11/17/22 0949)  Device: Rolling Walker (11/17/22 5539)  Other Apparatus: AFO; Left;Right;O2 (11/17/22 9218)  Assistance: Stand by assistance;Minimal assistance (11/17/22 0949)  Quality of Gait: Assist to manage O2 line due to poor awareness, flexed trunk, fair maneuvering of walker (11/17/22 0949)  Gait Deviations: Slow Raji;Decreased step length (11/17/22 0949)  Distance: 30 feet (11/17/22 0949)  Comments: Pt with 3 L O2 in place. SOB following 30 feet - O2 sat at 92% (11/17/22 0949)  Ambulation  Surface: Carpet (11/21/22 0929)  Device: Rolling walker (11/21/22 0929)  Distance: 50' x 2 (11/21/22 1421)  Activity: Within Unit (11/21/22 5293)  Additional Factors: Right AFO; Left AFO; Increased time to complete (11/21/22 0929)  Assistance Level: Modified independent;Supervision (11/21/22 1421)  Gait Deviations: Slow raji (11/21/22 1421)  Skilled Clinical Factors: Steady gait. (11/21/22 1421)  Stairs:  Stairs/Curb  Stairs?: Yes (11/17/22 0949)  Stairs  # Steps : 4 (11/17/22 0949)  Stairs Height: 6\" (11/17/22 0949)  Rails: Bilateral (11/17/22 0949)  Assistance:  Moderate assistance;Minimal assistance (11/17/22 0949)  Comment: mild post LOB with descent requiring recovery assistance; mild SOB following with O2 sat at 92% following with 3L O2 in place (11/17/22 0949)  Stairs  Stair Height: 6'' (11/21/22 0929)  Device: Bilateral handrails (11/21/22 0929)  Number of Stairs: 4 (11/21/22 0929)  Additional Factors: Non-reciprocal going up;Non-reciprocal going down (11/21/22 0929)  Assistance Level: Supervision (11/21/22 0929)  Skilled Clinical Factors: increased time, slow steady, assist needed to manage 02 line safely (11/21/22 0929)  W/C mobility:       Occupational Therapy:   Hand Dominance: Right  ADL  Feeding: Independent (11/17/22 1011)  Grooming: Setup (11/17/22 1011)  UE Bathing: Setup (11/17/22 1011)  LE Bathing: Supervision (11/17/22 1011)  UE Dressing: Minimal assistance (11/17/22 1011)  UE Dressing Skilled Clinical Factors: Declines bra, assist pulling shirt all the the way down in the back. (11/17/22 1011)  LE Dressing: Dependent/Total (11/17/22 1011)  LE Dressing Skilled Clinical Factors: Assist with B socks, B shoes, pulling pant legs through d/t stretchy pants, and assist pulling pants up over hips. Has long handled shoe horn from [facility], was unable to utilize this date. (11/17/22 1011)  Toileting: Moderate assistance (11/17/22 1011)  Toileting Skilled Clinical Factors: Assist with pants (11/17/22 1011)  Additional Comments: Pt completed shower ADL as above. Fatigues quickly with frequent rest breaks (11/17/22 1011)  Toilet Transfers  Toilet - Technique: Stand step (11/17/22 1226)  Equipment Used: Grab bars (11/17/22 1226)  Toilet Transfer: Stand by assistance (11/17/22 1226)  Toilet Transfers Comments: Slow transfer, no AFOs d/t pt about to shower (11/17/22 1226)     Shower Transfers  Shower - Transfer From: Wheelchair (11/17/22 1226)  Shower - Transfer Type: To and From (11/17/22 1226)  Shower - Transfer To:  Shower seat with back (11/17/22 1226)  Shower - Technique: Stand pivot (11/17/22 1226)  Shower Transfers: Stand by assistance (11/17/22 1226)  Shower Transfers Comments: Increased time (11/17/22 1226)    Speech Therapy:            Diet/Swallow:                      Lab/X-ray studies reviewed, analyzed and discussed with patient and staff:   Recent Results (from the past 24 hour(s))   Magnesium    Collection Time: 11/21/22  5:08 AM   Result Value Ref Range    Magnesium 1.8 1.7 - 2.4 mg/dL       CTA CHEST  11/11/2022  No evidence of pulmonary embolism. Severe cardiomegaly with evidence of congestive heart failure manifesting as mild edema and bilateral trace pleural effusions as well as significant right heart dysfunction including partially visualized perihepatic ascites. CT GUIDED NEEDLE PLACEMENT1. Successful aspiration of left lower quadrant abdominal abscess. 2.Only a few drops of very thick gelatinous serosanguineous fluid with extensive solid debris was able to be aspirated. Due to this a drain was unable to be placed. Patient will be seen general surgery for evaluation and possible surgical intervention. CT ABDOMEN PELVIS      1. The right-sided abscess drain has been completely pulled out of the abscess and now lies in the right abdominal subcutaneous soft tissues. 2.The right lower abdominal abscess has increased in size when compared to prior study dating September 14 likely due to accumulation of infectious fluid due to displacement of the drainage catheter. 3.Again seen is a large cystic mass in the lower pelvis. ABDOMEN The visualized portion of the lung bases demonstrates a small effusion in the left thoracic cavity. The liver is of normal size and attenuation without focal lesions. The spleen is unremarkable. Kidneys demonstrate prompt enhancement and excretion of contrast without signs of hydronephrosis or focal mass. The pancreas and adrenals are unremarkable.  The upper abdominal bowel loops appear normal. Again seen is ectasia of the visualized abdominal aorta and tortuosity, unchanged from prior. 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 There are no signs of upper abdominal fluid collections. PELVIS  Again seen is a large abscess in the right lower abdomen/pelvis abutting the cecum now measuring 6.6 x 9.4 in transverse and AP dimensions, previously measuring 6 x 6.7 and AP and transverse dimensions. The previously seen abscess drain has now been pulled out of the abscess and lies in the subcutaneous soft tissues and is not contributing to any drainage of the abscess consistent with abscess enlargement. Again seen is a large cystic mass in the pelvis. XR CHEST  11/11/2022    The heart is enlarged. Bilateral aspect airspace disease is noted which could represent CHF or less likely pneumonia. There is no consolidation seen within the right lung base. There is left lung base consolidation which is favored to represent either atelectasis or a pleural effusion. Note is made of a right-sided PICC line. The catheter tip is at the level of the clavicle head. Cardiomegaly with multifocal bilateral airspace disease favored to represent CHF Left lung base consolidation which could represent atelectasis, pleural effusion or less likely pneumonia. .        Previous extensive, complex labs, notes and diagnostics reviewed and analyzed. ALLERGIES:    Allergies as of 11/16/2022 - Fully Reviewed 11/16/2022   Allergen Reaction Noted    Latex  07/19/2017    Tape [adhesive tape]  06/28/2016      (please also verify by checking MAR)     Today I evaluated this patient for periodic reassessment of medical and functional status. The patient was discussed in detail at the treatment team meeting focusing on current medical issues, progress in therapies, social issues, psychological issues, barriers to progress and strategies to address these barriers, and discharge planning.   See the addendum to rehab progress note-as a second progress note in the chart. The patient continues to be high risk for future disability and their medical and rehabilitation prognosis continue to be good and therefore, we will continue the patient's rehabilitation course as planned. The patient's tentative discharge date was set. Patient and family education was discussed. The patient was made aware of the team discussion regarding their progress. 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 Functional and medical status reassessed regarding patients ability to participate in therapies and patient found to be able to participate in acute intensive comprehensive inpatient rehabilitation program including PT/OT to improve balance, ambulation, ADLs, and to improve the P/AROM. Therapeutic modifications regarding activities in therapies, place, amount of time per day and intensity of therapy made daily. In bed therapies or bedside therapies prn. Bowel and Bladder dysfunction  , Neurogenic bowel and bladder:  frequent toileting, ambulate to bathroom with assistance, check post void residuals. Check for C.difficile x1 if >2 loose stools in 24 hours, continue bowel & bladder program.  Monitor bowel and bladder function. Lactinex 2 PO every AC. MOM prn, Brown Bomb prn, Glycerin suppository prn, enema prn. Encourage therapy and nursing to co-treat and problem solve re continence. Severe abd pain as well as generalized OA pain: reassess pain every shift and prior to and after each therapy session, give prn Tylenol and consider scheduled Tylenol, modalities prn in therapy, masage, Lidoderm, K-pad prn. Consider scheduled AM pain meds. Skin healing abd and breakdown risk:  continue pressure relief program.  Daily skin exams and reports from nursing.   Fatigue due to nutritional and hydration deficiency: Add and titrate vitamin B12 vitamin D and CoQ10 continue to monitor I&Os, calorie counts prn, dietary consult prn. Add healthy snack at night. Acute episodic insomnia with situational adjustment disorder:  prn Ambien, monitor for day time sedation. Falls risk elevated:  patient to use call light to get nursing assistance to get up, bed and chair alarm. Elevated DVT risk: progressive activities in PT, continue prophylaxis PORTILLO hose, elevation and  [place] . Complex discharge planning:  Weekly team meeting every Monday to re-assess progress towards goals, discuss and address social, psychological and medical comorbidities and to address difficulties they may be having progressing in therapy. Patient and family education is in progress. The patient is to follow-up with their family physician after discharge. Complex Active General Medical Issues that complicate care Assess & Plan:    Charcot-Arleen tooth neuropathy-bilateral AFOs   A-fib,   CHF (congestive heart failure,  Aortic thrombus   -Acute rehab to monitor heart rate and rhythm with the option of telemetry and the effects of chronotropic medication with respect to increasing physical activity and exercise in PT, OT, ADLs with medication titration to lowest effective dosing. Continue blood signs every shift focusing on heart rate, rhythm and blood pressure checks with orthostatic checks-monitoring the effect of exercise, therapy and posture. Consult hospitalist for backup medical and adjust/add medications ( Entresto, Xarelto, Lasix, Lanoxin, Coreg). Monitor heart rate and blood pressure as well as medications effects on vital signs before during and after therapy with especial focus on preventing orthostasis and falls risk.     Lumbar stenosis with neurogenic claudication    Hypokalemia-check BMP and supplement as needed,      Right lower quadrant abdominal pain dt   Abdominal mass  Hx of drainage of abscess,  Streptococcus viridans infection-IV antibiotics patient and family have been trained. Reconsult infectious disease     Anemia-Monitor vital signs monitor for orthostasis and tachycardia, check H&H prn. Vitamin B12 and iron-dose iron with food to prevent GI upset. Monitor for constipation. Monitor stools for blood. Palliative care encounter-for pain as needed    Depression-emotional support provided daily, vitamin B12, encourage participation in rehabilitation support group and recreational therapy, adjust/add medications ( Celexa )  GERD-Elevate head of bed after meals, monitor stools for blood, lowest effective dose of PPI, consider Tums. Focus of today's plan-  Initiate and modify therapuetic plan to meet patients individual needs, add rest breaks as needed, and Focus on emotional health and caregiver involvement in care.   Cont plan dc planning  Abx, K corrected   Rodger Galeazzi MD Armand Cheese, D.O., PM&R     Attending    [address]

## 2022-11-22 NOTE — CARE COORDINATION
JANETW spoke with pt's son Mira and he requested for pt to continue IVs with [facility] as they provided a ball device that was easy for pt to manage. JANETW sent over referral with signed script and pt is set to receive IV meds and supplies through [address] [place] [address] Dr Rose. Pt will resume HHC with [facility] as well.  Electronically signed by KIN Morley, JACKLYN on 11/22/2022 at 4:15 PM

## 2022-11-22 NOTE — PROGRESS NOTES
INDIVIDUALIZED OVERALL REHAB PLAN OF CARE  ADDENDUM TO REHAB PROGRESS NOTE-for audit purposes must also refer to this day's clinical note and combine the information      Date: 2022  Patient Name: Shantelle Malone   Room: X178/D157-71    MRN: [id number]    : 1932  (80 y.o.)  Gender: female       Today 2022 during weekly team meeting, I reviewed the patient Shantelle Malone in detail with the therapists and nurses involved in patient's care gathering complex physiatric data regarding current medical issues, progress in therapies, factors limiting progress, social issues, psychological issues, ongoing therapeutic plans and discharge planning. Legend:  I= independent Im =Modified independent  S=Supervised SB=stand by MAGANA=set up CG=contact fran Min= minimal Mod=Moderate Max=maximal Max of 2 =maximal assist of 2 people      CURRENT FUNCTIONAL STATUS:        NURSING ISSUES:         Nursing will continue to focus on bowel and bladder continence transitioning toward independence by time of discharge. Monitoring post void residuals monitoring for severe constipation and bowel obstruction. Barriers to progress and discharge bowel/bladder dysfunction      Bowel function- continent/normal  Plans to address- laxative     Bladder function-  incontinent    Plans to address- wean taylor   Pt and Family training goals-  wean taylor      Focus on achieving ADL goals with co-treating with OT when possible. Focus on cognition and co treat with SLP when possible. PHYSICAL THERAPY  Bed mobility:  Bed mobility  Bridging: Independent (22 1018)  Rolling to Left: Modified independent (22 1007)  Rolling to Right: Modified independent (22 1007)  Supine to Sit: Minimal assistance (22 1026)  Sit to Supine: Modified independent (22 1007)  Scooting: Stand by assistance (22 1018)  Bed Mobility Comments: Pt requires increased time and effort.   VCs for positioning once supine. Increased effort with sup to sit. Pt reports dizziness with initial sup to sit. (11/18/22 1026)  Bed Mobility  Overall Assistance Level: Supervision;Modified Independent (11/19/22 1024)  Additional Factors: Head of bed flat; Without handrails (11/21/22 0928)  Overall Assistance Level: Supervision;Modified Independent (11/19/22 1024)  Additional Factors: Head of bed flat; Without handrails (11/21/22 0928)  Roll Left  Assistance Level: Modified independent (11/21/22 0928)  Roll Right  Assistance Level: Modified independent (11/21/22 0928)  Sit to Supine  Assistance Level: Modified independent (11/21/22 0928)  Skilled Clinical Factors: on mat with rest break required after (11/19/22 1024)  Supine to Sit  Assistance Level: Modified independent (11/21/22 0928)  Skilled Clinical Factors: uses valsalva, cues to breathe through exertion (11/19/22 1024)  Scooting  Assistance Level: Modified independent (11/21/22 0928)  Transfers:  Transfers  Sit to Stand: Supervision (11/18/22 1019)  Stand to Sit: Supervision (11/18/22 1019)  Bed to Chair: Stand by assistance (11/17/22 0948)  Comment: toilet transfers SBA - cues for safety with O2 line management and reaching for surface in too great a distance (11/17/22 0948)  Transfers  Surface: From bed; Wheelchair; To chair with arms;From chair with arms (11/21/22 5407)  Additional Factors: Hand placement cues (11/19/22 1024)  Device: Abram Crew (11/21/22 0928)  Sit to Stand  Assistance Level: Modified independent (11/21/22 0928)  Skilled Clinical Factors: uses safe and consistent hand placement (11/19/22 1320)  Stand to Sit  Assistance Level: Modified independent (11/21/22 0928)  Skilled Clinical Factors: patient needs cues for safe approach to chair with fatigue (11/19/22 1320)  Bed To/From Chair  Technique: Stand step (11/21/22 0928)  Assistance Level: Modified independent (11/21/22 0928)  Car Transfer  Assistance Level: Supervision (11/21/22 1421)  Skilled Clinical Factors: Improved ability to problem solve with getting out or car for hand placement. (11/21/22 1421)  Gait:   Ambulation  Surface: Level tile (11/17/22 0949)  Device: Rolling Walker (11/17/22 6459)  Other Apparatus: AFO; Left;Right;O2 (11/17/22 0203)  Assistance: Stand by assistance;Minimal assistance (11/17/22 0949)  Quality of Gait: Assist to manage O2 line due to poor awareness, flexed trunk, fair maneuvering of walker (11/17/22 0949)  Gait Deviations: Slow Raji;Decreased step length (11/17/22 0949)  Distance: 30 feet (11/17/22 0949)  Comments: Pt with 3 L O2 in place. SOB following 30 feet - O2 sat at 92% (11/17/22 0949)  Ambulation  Surface: Carpet (11/21/22 0929)  Device: Rolling walker (11/21/22 0929)  Distance: 50' x 2 (11/21/22 1421)  Activity: Within Unit (11/21/22 9849)  Additional Factors: Right AFO; Left AFO; Increased time to complete (11/21/22 0929)  Assistance Level: Modified independent;Supervision (11/21/22 1421)  Gait Deviations: Slow raji (11/21/22 1421)  Skilled Clinical Factors: Steady gait. (11/21/22 1421)  Stairs:  Stairs/Curb  Stairs?: Yes (11/17/22 0949)  Stairs  # Steps : 4 (11/17/22 0949)  Stairs Height: 6\" (11/17/22 0949)  Rails: Bilateral (11/17/22 0949)  Assistance:  Moderate assistance;Minimal assistance (11/17/22 0949)  Comment: mild post LOB with descent requiring recovery assistance; mild SOB following with O2 sat at 92% following with 3L O2 in place (11/17/22 0949)  Stairs  Stair Height: 6'' (11/21/22 0929)  Device: Bilateral handrails (11/21/22 0929)  Number of Stairs: 4 (11/21/22 0929)  Additional Factors: Non-reciprocal going up;Non-reciprocal going down (11/21/22 0929)  Assistance Level: Supervision (11/21/22 0929)  Skilled Clinical Factors: increased time, slow steady, assist needed to manage 02 line safely (11/21/22 0929)  W/C mobility:           Pt and Family training goals-  teach patient/family best use of assistive device        OCCUPATIONAL THERAPY  Feeding  Assistance Level: Modified independent (11/18/22 1241)  Grooming/Oral Hygiene  Assistance Level: Supervision (11/19/22 0946)  Skilled Clinical Factors: standing (11/19/22 0946)  Upper Extremity Bathing  Assistance Level: Supervision (11/18/22 1241)  Lower Extremity Bathing  Assistance Level: Stand by assist (11/18/22 1241)  Upper Extremity Dressing  Assistance Level: Set-up (11/19/22 0946)  Skilled Clinical Factors: To don shirt- vc's d/t IV being hooked up and not able to don shirt until unhooked by nursing (vc's/visual with pointing provided) (11/19/22 0946)  Lower Extremity Dressing  Assistance Level: Moderate assistance (11/19/22 0946)  Skilled Clinical Factors: able to thread L leg but required max A to thread RLE over feet to mid lower leg- pt ablet to complete rest, including knee to waist pant management with SBA during standing using rolling walker (11/19/22 0946)  Putting On/Taking Off Footwear  Assistance Level: Dependent (11/21/22 1430)  Skilled Clinical Factors: dep A for sock/braces/shoes (11/21/22 1430)  Toileting  Assistance Level: Modified independent (11/21/22 1430)  Skilled Clinical Factors: Min assist to pull up pants (11/18/22 1350)  Toilet Transfers  Technique: Stand step (11/19/22 0946)  Equipment: Standard toilet (11/21/22 1430)  Assistance Level: Modified independent (11/21/22 1430)  Skilled Clinical Factors: ww level (11/21/22 1430)  Tub/Shower Transfers  Skilled Clinical Factors: Patient declined shower due to showering yesterday (11/18/22 1241)  ADL  Feeding: Independent (11/17/22 1011)  Grooming: Setup (11/17/22 1011)  UE Bathing: Setup (11/17/22 1011)  LE Bathing: Supervision (11/17/22 1011)  UE Dressing: Minimal assistance (11/17/22 1011)  UE Dressing Skilled Clinical Factors: Declines bra, assist pulling shirt all the the way down in the back.  (11/17/22 1011)  LE Dressing: Dependent/Total (11/17/22 1011)  LE Dressing Skilled Clinical Factors: Assist with B socks, B shoes, pulling pant legs through d/t stretchy pants, and assist pulling pants up over hips. Has long handled shoe horn from dmitri, was unable to utilize this date. (11/17/22 1011)  Toileting: Moderate assistance (11/17/22 1011)  Toileting Skilled Clinical Factors: Assist with pants (11/17/22 1011)  Additional Comments: Pt completed shower ADL as above. Fatigues quickly with frequent rest breaks (11/17/22 1011)  Toilet Transfers  Toilet - Technique: Stand step (11/17/22 1226)  Equipment Used: Grab bars (11/17/22 1226)  Toilet Transfer: Stand by assistance (11/17/22 1226)  Toilet Transfers Comments: Slow transfer, no AFOs d/t pt about to shower (11/17/22 1226)    Plans for addressing ADL deficits affecting: Focus on sequencing. Skin care and hygiene deficits- no problems noted   Plans to address- goals achieved                SPEECH THERAPY/SLP             Plans for cognitive and communication issues affecting addressing:   Pt and Family training goals-  teach patient and family home medication administration charting      Diet/Swallow:                           COGNITION  OT: Cognition Comment: Appears WFL, has limited [place] proficiency but able to follow visual cues, makes good safety choices for mobility @FLOWTIME(304  SP:        Social History     Socioeconomic History    Marital status:       Spouse name: Not on file    Number of children: 2    Years of education: Not on file    Highest education level: Not on file   Occupational History    Not on file   Tobacco Use    Smoking status: Never    Smokeless tobacco: Never   Vaping Use    Vaping Use: Never used   Substance and Sexual Activity    Alcohol use: No     Alcohol/week: 0.0 standard drinks    Drug use: No    Sexual activity: Not Currently   Other Topics Concern    Not on file   Social History Narrative    , Lives With: Alone, son lives down the street, dtr is in the area    Type of Home: [address]: One level    Home Access: Stairs to enter with rails- Number of Steps: 2- Rails: Both    Bathroom Shower/Tub: Tub/Shower unit, Bathroom Equipment: Grab bars in shower, Shower chair    Home Equipment: Rolling walker, Cane(Pt infrequemtly uses DME for ambulation and prefers to furniture walk in home)    ADL Assistance: [address], [address]: Independent    Homemaking Responsibilities: Yes    Ambulation Assistance: Independent, Transfer Assistance: Independent    Additional Comments: Son stops over 2 times daily         Social Determinants of Health     Financial Resource Strain: Not on file   Food Insecurity: Not on file   Transportation Needs: Not on file   Physical Activity: Not on file   Stress: Not on file   Social Connections: Not on file   Intimate Partner Violence: Not on file   Housing Stability: Not on file           THERAPY, MEDICAL AND NURSING COORDINATION:    [x]  Pain medication before therapies     [x]  Check orthostatic BP and monitor heart rate and medications effects with therapy      [x]  Ambulate to the bathroom in room    [x]  Add scheduled rest beaks     [x]  In room therapies as needed      Discharge date set for:              wed [address] with:    [facility]  with help from   [facility]            And:      Home Health Care:     [x]  PT    []  OT    []  ST   [x]  Aide   []  SW    [x]  RN               Or   Outpt Tx:  []  PT    []  OT    []  ST        Equipment:  [employer],        At D/C their function is goaled at:   PT:Long Term Goal 1: Pt to complete bed mobility with indep  Long Term Goal 2: Pt to complete transfers with indep  Long Term Goal 3: Pt to ambulate 50-150ft with LRD and Supervision - indep for 20 feet with ability to manage O2 line  Long Term Goal 4: Pt to complete 2 steps with HR and CGA  OT:Eating  Assistance Needed: Setup or clean-up assistance  CARE Score: 5  Discharge Goal: Independent, Oral Hygiene  Assistance Needed: Setup or clean-up assistance  CARE Score: 5  Discharge Goal: Independent, Toileting Hygiene  Assistance needed: Supervision or touching assistance  CARE Score: 4  Discharge Goal: Independent, Shower/Bathe Self  Assistance Needed: Supervision or touching assistance  CARE Score: 4  Discharge Goal: Independent  Upper Body Dressing  Assistance Needed: Supervision or touching assistance  CARE Score: 4  Discharge Goal: Independent, Lower Body Dressing  Assistance Needed: Dependent  CARE Score: 1  Discharge Goal: Independent, Putting On/Taking Off Footwear  Assistance Needed: Dependent  CARE Score: 1  Discharge Goal: Independent, Toilet Transfer  Assistance needed: Supervision or touching assistance  CARE Score: 4  Discharge Goal: Independent  SP:               From a cognitive standpoint they will need:        24 hr vs daily   supervision  -->progress to occasional             Significant problems/ barriers to functional progress include: Pt is at a high risk for functional loss,      []  Acute infection/UTI    []  Low BP's     []  COPD flare-up   []  Uncontrolled blood sugar     []  Progressive anemia     []  poor endurance    []  Severe pain     []  Impaired mental status    []  Urinary incontinence    []  Bowel incontinence           Plan to correct barriers to functional progress: Add scheduled rest breaks, CoQ 10 and Vit B 12, control pain by using ice Lidoderm rest and massage as well as pain medications prior to therapy. Spread therapy of 15 hours out over a 7 day window to accommodate rest breaks and medical interventions. Patient seems to be making fair to good response to these interventions. Based on a comprehensive evaluation of the above, the individualized therapy and Discharge plan will be:    -Times stated are an average that will be varied based on the patient's daily need.        PT    1 1/2  hrs/day 5-7 days per week      OT    1 1/2 hrs per day 5-7 days per week     ST     1/2    hrs /day 3-5 days per week       Estimated LOS   1-2 week(s)    -Overall functional prognosis: [x]  Good    []  Fair    []  Poor     -Medical Prognosis:   [x]  Good    []  Fair    []  Poor    This patient was made aware of the discussion of Plan of Care, their projected dicharge date and their projected function at discharge.      Cinthia Denney, DO

## 2022-11-23 VITALS
WEIGHT: 150.38 LBS | HEIGHT: 59 IN | BODY MASS INDEX: 30.32 KG/M2 | OXYGEN SATURATION: 94 % | SYSTOLIC BLOOD PRESSURE: 128 MMHG | TEMPERATURE: 98.2 F | DIASTOLIC BLOOD PRESSURE: 77 MMHG | RESPIRATION RATE: 16 BRPM | HEART RATE: 78 BPM

## 2022-11-23 LAB — MAGNESIUM: 1.8 MG/DL (ref 1.7–2.4)

## 2022-11-23 PROCEDURE — 2700000000 HC OXYGEN THERAPY PER DAY

## 2022-11-23 PROCEDURE — 2580000003 HC RX 258: Performed by: INTERNAL MEDICINE

## 2022-11-23 PROCEDURE — 6370000000 HC RX 637 (ALT 250 FOR IP): Performed by: PHYSICAL MEDICINE & REHABILITATION

## 2022-11-23 PROCEDURE — 99232 SBSQ HOSP IP/OBS MODERATE 35: CPT | Performed by: INTERNAL MEDICINE

## 2022-11-23 PROCEDURE — 36415 COLL VENOUS BLD VENIPUNCTURE: CPT

## 2022-11-23 PROCEDURE — 97112 NEUROMUSCULAR REEDUCATION: CPT

## 2022-11-23 PROCEDURE — 97116 GAIT TRAINING THERAPY: CPT

## 2022-11-23 PROCEDURE — 83735 ASSAY OF MAGNESIUM: CPT

## 2022-11-23 PROCEDURE — 97530 THERAPEUTIC ACTIVITIES: CPT

## 2022-11-23 PROCEDURE — 6360000002 HC RX W HCPCS: Performed by: INTERNAL MEDICINE

## 2022-11-23 PROCEDURE — 6370000000 HC RX 637 (ALT 250 FOR IP): Performed by: INTERNAL MEDICINE

## 2022-11-23 RX ADMIN — SUCRALFATE 1 G: 1 TABLET ORAL at 00:12

## 2022-11-23 RX ADMIN — DIGOXIN 125 MCG: 125 TABLET ORAL at 09:01

## 2022-11-23 RX ADMIN — SACUBITRIL AND VALSARTAN 1 TABLET: 24; 26 TABLET, FILM COATED ORAL at 09:01

## 2022-11-23 RX ADMIN — CITALOPRAM HYDROBROMIDE 20 MG: 20 TABLET ORAL at 09:02

## 2022-11-23 RX ADMIN — SUCRALFATE 1 G: 1 TABLET ORAL at 12:19

## 2022-11-23 RX ADMIN — FUROSEMIDE 40 MG: 40 TABLET ORAL at 09:01

## 2022-11-23 RX ADMIN — Medication 10 ML: at 10:00

## 2022-11-23 RX ADMIN — LACTOBACILLUS TAB 4 TABLET: TAB at 09:01

## 2022-11-23 RX ADMIN — PIPERACILLIN AND TAZOBACTAM 3375 MG: 3; .375 INJECTION, POWDER, FOR SOLUTION INTRAVENOUS at 05:36

## 2022-11-23 RX ADMIN — RIVAROXABAN 15 MG: 15 TABLET, FILM COATED ORAL at 09:01

## 2022-11-23 RX ADMIN — SUCRALFATE 1 G: 1 TABLET ORAL at 05:10

## 2022-11-23 RX ADMIN — PANTOPRAZOLE SODIUM 40 MG: 40 TABLET, DELAYED RELEASE ORAL at 09:01

## 2022-11-23 RX ADMIN — POTASSIUM CHLORIDE 20 MEQ: 1500 TABLET, EXTENDED RELEASE ORAL at 09:02

## 2022-11-23 RX ADMIN — LEVOTHYROXINE SODIUM 88 MCG: 88 TABLET ORAL at 05:10

## 2022-11-23 RX ADMIN — CARVEDILOL 6.25 MG: 6.25 TABLET, FILM COATED ORAL at 09:02

## 2022-11-23 RX ADMIN — FERROUS SULFATE TAB 325 MG (65 MG ELEMENTAL FE) 325 MG: 325 (65 FE) TAB at 09:01

## 2022-11-23 RX ADMIN — Medication 100 MG: at 09:01

## 2022-11-23 ASSESSMENT — ENCOUNTER SYMPTOMS
EYES NEGATIVE: 1
BLOOD IN STOOL: 0
ABDOMINAL PAIN: 1
COUGH: 0
CHEST TIGHTNESS: 0
WHEEZING: 0
NAUSEA: 0
SHORTNESS OF BREATH: 0
RESPIRATORY NEGATIVE: 1
STRIDOR: 0

## 2022-11-23 NOTE — PROGRESS NOTES
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 Other complicating issues include intra-abdominal abscess and is getting IV Invanz. RN  Assessment completed. VSS. Denied pain. LBM 11/21. Continues zosyn for recent intraabdominal abscess. Per ID note 11/11- stop date is 12/5. Per ID note 11/20, at least x2 more weeks of antibiotics. SL PICC in place- patent, but no blood return. No distress noted. Call light within reach and bed alarm activated. Electronically signed by Eric Coleman RN on 11/22/2022 at 3:44 AM     ROS x10: The patient also complains of severely impaired mobility and activities of daily living. Otherwise no new problems with vision, hearing, nose, mouth, throat, dermal, cardiovascular, GI, , pulmonary, musculoskeletal, psychiatric or neurological. See also Acute Rehab PM&R H&P. Vital signs:  /77   Pulse 78   Temp 98.2 °F (36.8 °C) (Oral)   Resp 16   Ht 4' 11\" (1.499 m)   Wt 154 lb 12.8 oz (70.2 kg)   SpO2 94%   BMI 31.27 kg/m²   I/O:   PO/Intake:  fair PO intake,  reg diet low salt    Bowel:   continent   Bladder: continent   no taylor  General:  Patient is well developed,   adequately nourished, and    well kempt. HEENT:    Pupils equal, hearing intact to loud voice, external inspection of ear and nose benign. Inspection of lips, tongue and gums benign    Musculoskeletal: No significant change in strength or tone. All joints stable. Inspection and palpation of digits and nails show no clubbing, cyanosis or inflammatory conditions. Neuro/Psychiatric: Affect: flat but pleasant. Alert and oriented to person, place and situation with min cues.   No significant change in deep tendon reflexes or sensation  Lungs:  Diminished, CTA-B. Respiration effort is   normal at rest.     Heart:   S1 = S2,   RRR. Abdomen:  Soft,  generalized mostly epigastric-tender, no enlargement of liver or spleen. Extremities:   mild lower extremity edema    Skin:   Intact to general survey,  BUE bruises, old rlq drain site --no ss drainage    Rehabilitation:  Physical Therapy:   Bed mobility:  Bed mobility  Bridging: Independent (11/17/22 1018)  Rolling to Left: Modified independent (11/18/22 1007)  Rolling to Right: Modified independent (11/18/22 1007)  Supine to Sit: Minimal assistance (11/18/22 1026)  Sit to Supine: Modified independent (11/18/22 1007)  Scooting: Stand by assistance (11/17/22 1018)  Bed Mobility Comments: Pt requires increased time and effort. VCs for positioning once supine. Increased effort with sup to sit. Pt reports dizziness with initial sup to sit. (11/18/22 1026)  Bed Mobility  Overall Assistance Level: Supervision;Modified Independent (11/19/22 1024)  Additional Factors: Head of bed flat; With handrails (11/22/22 1820)  Overall Assistance Level: Supervision;Modified Independent (11/19/22 1024)  Additional Factors: Head of bed flat; With handrails (11/22/22 0952)  Roll Left  Assistance Level: Modified independent (11/22/22 0952)  Roll Right  Assistance Level: Modified independent (11/22/22 0952)  Sit to Supine  Assistance Level: Modified independent (11/22/22 0952)  Skilled Clinical Factors: on mat with rest break required after (11/19/22 1024)  Supine to Sit  Assistance Level: Modified independent (11/22/22 0952)  Skilled Clinical Factors: uses valsalva, cues to breathe through exertion (11/19/22 1024)  Scooting  Assistance Level: Modified independent (11/22/22 0952)  Transfers:  Transfers  Sit to Stand: Supervision (11/18/22 1019)  Stand to Sit: Supervision (11/18/22 1019)  Bed to Chair: Stand by assistance (11/17/22 0948)  Comment: toilet transfers SBA - cues for safety with O2 line management and reaching for surface in too great a distance (11/17/22 0948)  Transfers  Surface: To chair with arms;From chair with arms; Wheelchair (11/22/22 1359)  Additional Factors: Hand placement cues (11/19/22 1024)  Device: Michelle Asif (11/22/22 1359)  Sit to Stand  Assistance Level: Modified independent (11/22/22 1359)  Skilled Clinical Factors: uses safe and consistent hand placement (11/19/22 1320)  Stand to Sit  Assistance Level: Modified independent (11/22/22 1359)  Skilled Clinical Factors: patient needs cues for safe approach to chair with fatigue (11/19/22 1320)  Bed To/From Chair  Technique: Stand step (11/21/22 0928)  Assistance Level: Modified independent (11/22/22 0952)  Car Transfer  Assistance Level: Modified independent (11/22/22 1359)  Skilled Clinical Factors: Improved ability to problem solve with getting out or car for hand placement. (11/21/22 1421)  Gait:   Ambulation  Surface: Level tile (11/17/22 0949)  Device: Rolling Walker (11/17/22 0632)  Other Apparatus: AFO; Left;Right;O2 (11/17/22 2411)  Assistance: Stand by assistance;Minimal assistance (11/17/22 0949)  Quality of Gait: Assist to manage O2 line due to poor awareness, flexed trunk, fair maneuvering of walker (11/17/22 0949)  Gait Deviations: Slow Raji;Decreased step length (11/17/22 0949)  Distance: 30 feet (11/17/22 0949)  Comments: Pt with 3 L O2 in place. SOB following 30 feet - O2 sat at 92% (11/17/22 0949)  Ambulation  Surface: Carpet (11/22/22 1359)  Device: Rolling walker (11/22/22 1359)  Distance: 50' x 2 (11/22/22 1359)  Activity: Within Unit (11/22/22 2380)  Additional Factors: Right AFO; Left AFO; Increased time to complete (11/22/22 1359)  Assistance Level: Modified independent;Supervision (11/22/22 1359)  Gait Deviations: Slow raji (11/22/22 1359)  Skilled Clinical Factors: Mild unsteadiness this PM but no LOB (11/22/22 3560)  Stairs:  Stairs/Curb  Stairs?: Yes (11/17/22 0062)  Stairs  # Steps : 4 (11/17/22 1174)  Stairs Height: 6\" (11/17/22 0949)  Rails: Bilateral (11/17/22 0949)  Assistance: Moderate assistance;Minimal assistance (11/17/22 0949)  Comment: mild post LOB with descent requiring recovery assistance; mild SOB following with O2 sat at 92% following with 3L O2 in place (11/17/22 0949)  Stairs  Stair Height: 6'' (11/21/22 0929)  Device: Bilateral handrails (11/21/22 0929)  Number of Stairs: 4 (11/22/22 1359)  Additional Factors: Non-reciprocal going up;Non-reciprocal going down (11/22/22 1359)  Assistance Level: Supervision (11/22/22 1359)  Skilled Clinical Factors: increased time, slow steady (11/22/22 1359)  Curb  Curb Height: 2'' (11/22/22 1359)  Device: Rolling walker (11/22/22 1359)  Number of Curbs: 1 (11/22/22 1359)  Additional Factors: Verbal cues; Non-reciprocal going up;Non-reciprocal going down (11/22/22 1359)  Assistance Level: Stand by assist;Contact guard assist (11/22/22 1359)  W/C mobility:       Occupational Therapy:   Hand Dominance: Right  ADL  Feeding: Independent (11/22/22 1406)  Grooming: Independent (11/22/22 1406)  UE Bathing: Independent (11/22/22 1406)  LE Bathing: Independent (11/22/22 1406)  UE Dressing: Independent (11/22/22 1406)  UE Dressing Skilled Clinical Factors: Declines bra, assist pulling shirt all the the way down in the back. (11/17/22 1011)  LE Dressing: Dependent/Total (11/17/22 1011)  LE Dressing Skilled Clinical Factors: Patient needed assist with socks, shoes and braces (11/22/22 1406)  Toileting: Modified independent  (11/22/22 1406)  Toileting Skilled Clinical Factors: Assist with pants (11/17/22 1011)  Additional Comments: Pt completed shower ADL as above.  Fatigues quickly with frequent rest breaks (11/17/22 1011)  Toilet Transfers  Toilet - Technique: Ambulating (11/22/22 1410)  Equipment Used: Standard toilet (11/22/22 1410)  Toilet Transfer: Modified independent (11/22/22 1410)  Toilet Transfers Comments: Slow transfer, no AFOs d/t pt about to shower enhancement and excretion of contrast without signs of hydronephrosis or focal mass. The pancreas and adrenals are unremarkable. The upper abdominal bowel loops appear normal. Again seen is ectasia of the visualized abdominal aorta and tortuosity, unchanged from prior. 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 There are no signs of upper abdominal fluid collections. PELVIS  Again seen is a large abscess in the right lower abdomen/pelvis abutting the cecum now measuring 6.6 x 9.4 in transverse and AP dimensions, previously measuring 6 x 6.7 and AP and transverse dimensions. The previously seen abscess drain has now been pulled out of the abscess and lies in the subcutaneous soft tissues and is not contributing to any drainage of the abscess consistent with abscess enlargement. Again seen is a large cystic mass in the pelvis. XR CHEST  11/11/2022    The heart is enlarged. Bilateral aspect airspace disease is noted which could represent CHF or less likely pneumonia. There is no consolidation seen within the right lung base. There is left lung base consolidation which is favored to represent either atelectasis or a pleural effusion. Note is made of a right-sided PICC line. The catheter tip is at the level of the clavicle head. Cardiomegaly with multifocal bilateral airspace disease favored to represent CHF Left lung base consolidation which could represent atelectasis, pleural effusion or less likely pneumonia. .        Previous extensive, complex labs, notes and diagnostics reviewed and analyzed. ALLERGIES:    Allergies as of 11/16/2022 - Fully Reviewed 11/16/2022   Allergen Reaction Noted    Latex  07/19/2017    Tape [adhesive tape]  06/28/2016      (please also verify by checking MAR)     Today I evaluated this patient for periodic reassessment of medical and functional status.   The patient was discussed in detail at the treatment team meeting focusing on current medical issues, progress in therapies, social issues, psychological issues, barriers to progress and strategies to address these barriers, and discharge planning. See the addendum to rehab progress note-as a second progress note in the chart. The patient continues to be high risk for future disability and their medical and rehabilitation prognosis continue to be good and therefore, we will continue the patient's rehabilitation course as planned. The patient's tentative discharge date was set. Patient and family education was discussed. The patient was made aware of the team discussion regarding their progress. 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 Functional and medical status reassessed regarding patients ability to participate in therapies and patient found to be able to participate in acute intensive comprehensive inpatient rehabilitation program including PT/OT to improve balance, ambulation, ADLs, and to improve the P/AROM. Therapeutic modifications regarding activities in therapies, place, amount of time per day and intensity of therapy made daily. In bed therapies or bedside therapies prn. Bowel and Bladder dysfunction  , Neurogenic bowel and bladder:  frequent toileting, ambulate to bathroom with assistance, check post void residuals. Check for C.difficile x1 if >2 loose stools in 24 hours, continue bowel & bladder program.  Monitor bowel and bladder function. Lactinex 2 PO every AC. MOM prn, Brown Bomb prn, Glycerin suppository prn, enema prn. Encourage therapy and nursing to co-treat and problem solve re continence. Severe abd pain as well as generalized OA pain: reassess pain every shift and prior to and after each therapy session, give prn Tylenol and consider scheduled Tylenol, modalities prn in therapy, masage, Lidoderm, K-pad prn.  Consider scheduled AM pain meds. Skin healing abd and breakdown risk:  continue pressure relief program.  Daily skin exams and reports from nursing. Fatigue due to nutritional and hydration deficiency: Add and titrate vitamin B12 vitamin D and CoQ10 continue to monitor I&Os, calorie counts prn, dietary consult prn. Add healthy snack at night. Acute episodic insomnia with situational adjustment disorder:  prn Ambien, monitor for day time sedation. Falls risk elevated:  patient to use call light to get nursing assistance to get up, bed and chair alarm. Elevated DVT risk: progressive activities in PT, continue prophylaxis PORTILLO hose, elevation and  [place] . Complex discharge planning:  Weekly team meeting every Monday to re-assess progress towards goals, discuss and address social, psychological and medical comorbidities and to address difficulties they may be having progressing in therapy. Patient and family education is in progress. The patient is to follow-up with their family physician after discharge. Complex Active General Medical Issues that complicate care Assess & Plan:    Charcot-Arleen tooth neuropathy-bilateral AFOs   A-fib,   CHF (congestive heart failure,  Aortic thrombus   -Acute rehab to monitor heart rate and rhythm with the option of telemetry and the effects of chronotropic medication with respect to increasing physical activity and exercise in PT, OT, ADLs with medication titration to lowest effective dosing. Continue blood signs every shift focusing on heart rate, rhythm and blood pressure checks with orthostatic checks-monitoring the effect of exercise, therapy and posture. Consult hospitalist for backup medical and adjust/add medications ( Entresto, Xarelto, Lasix, Lanoxin, Coreg). Monitor heart rate and blood pressure as well as medications effects on vital signs before during and after therapy with especial focus on preventing orthostasis and falls risk.     Lumbar stenosis with neurogenic claudication    Hypokalemia-check BMP and supplement as needed,      Right lower quadrant abdominal pain dt   Abdominal mass  Hx of drainage of abscess,  Streptococcus viridans infection-IV antibiotics patient and family have been trained. Reconsult infectious disease     Anemia-Monitor vital signs monitor for orthostasis and tachycardia, check H&H prn. Vitamin B12 and iron-dose iron with food to prevent GI upset. Monitor for constipation. Monitor stools for blood. Palliative care encounter-for pain as needed    Depression-emotional support provided daily, vitamin B12, encourage participation in rehabilitation support group and recreational therapy, adjust/add medications ( Celexa )  GERD-Elevate head of bed after meals, monitor stools for blood, lowest effective dose of PPI, consider Tums. Focus of today's plan-  Initiate and modify therapuetic plan to meet patients individual needs, add rest breaks as needed, and Focus on emotional health and caregiver involvement in care.   Cont plan dc planning  Abx, K corrected   Family  training for IV antibiotics at home  Vicky Gayle D.O., PM&R     Attending    [facility] Pamela Cabrera

## 2022-11-23 NOTE — PROGRESS NOTES
Infectious Disease     Patient Name: Martha Lund  Date: 11/22/2022  YOB: 1932  Medical Record Number: [id number]        Intra-abdominal abscess          History of intra-abdominal abscess drained percutaneously treated with Invanz switch to Zosyn not resolved after an extended course of therapy repeat CT scan showed persistent abscess    Patient now to continue a prolonged course of IV antibiotics with Zosyn      Review of Systems   Constitutional:  Negative for chills, diaphoresis, fatigue and fever. Respiratory: Negative. Cardiovascular: Negative. Gastrointestinal: Negative. Physical Exam  Cardiovascular:      Heart sounds: Normal heart sounds. No murmur heard. Pulmonary:      Effort: No respiratory distress. Breath sounds: No stridor. No wheezing, rhonchi or rales. Abdominal:      General: Abdomen is flat. Bowel sounds are normal. There is no distension. Palpations: Abdomen is soft. There is no mass. Tenderness: There is no abdominal tenderness. There is no guarding. Blood pressure 128/77, pulse 78, temperature 98.2 °F (36.8 °C), temperature source Oral, resp. rate 16, height 4' 11\" (1.499 m), weight 154 lb 12.8 oz (70.2 kg), SpO2 94 %.       .   Lab Results   Component Value Date    WBC 7.1 11/20/2022    HGB 11.6 (L) 11/20/2022    HCT 34.1 (L) 11/20/2022    MCV 81.1 11/20/2022     11/20/2022     Lab Results   Component Value Date/Time     11/20/2022 03:33 PM    K 4.1 11/20/2022 03:33 PM    K 4.1 11/20/2022 03:33 PM     11/20/2022 03:33 PM    CO2 27 11/20/2022 03:33 PM    BUN 14 11/20/2022 03:33 PM    CREATININE 0.60 11/20/2022 03:33 PM    GLUCOSE 116 11/20/2022 03:33 PM    CALCIUM 8.9 11/20/2022 03:33 PM                ASSESSMENT:  Patient Active Problem List   Diagnosis    Lumbar stenosis with neurogenic claudication    Acquired scoliosis    Osteoporosis    Charcot Beronica Alba Tooth muscular atrophy    Excess weight    Osteoarthritis of lumbar spine with myelopathy    Osteoarthritis    Myelopathy (HCC)    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    Headache    Depression    Thoracic aortic aneurysm without rupture    Descending aortic aneurysm (HCC)    Vertigo    Shortness of breath    Essential hypertension    Acute on chronic combined systolic and diastolic CHF (congestive heart failure) (HCC)    Gait abnormality    A-fib (HCC)    Pleural effusion    Hypoxia    Acute pulmonary edema (HCC)    Acute on chronic combined systolic (congestive) and diastolic (congestive) heart failure (HCC)    Acute cystitis with hematuria    Chronic diastolic CHF (congestive heart failure) (HCC)    Right lower quadrant abdominal pain    Hyponatremia    Hypokalemia    Anemia    CHF (congestive heart failure) (HCC)    Hypothyroid    Abdominal mass    Central retinal vein occlusion, left eye, stable    Hx of drainage of abscess    Abdominal pain    Streptococcus viridans infection    Aortic thrombus (HCC)    Intra-abdominal abscess (HCC)    Adnexal mass    Change or removal of drains    Difficulty in walking    Muscle weakness (generalized)    Acute diastolic HF (heart failure) (HCC)    CHF (congestive heart failure), NYHA class I, acute on chronic, combined ([address]    Gastro-esophageal reflux disease without esophagitis    Intracardiac thrombosis, not elsewhere classified    Impaired mobility and ADLs    Palliative care encounter    Advanced care planning/counseling discussion    Goals of care, counseling/discussion         PLAN:    Intra-abdominal abscess    Zosyn at least 2 more week  Script left

## 2022-11-23 NOTE — PLAN OF CARE
Problem: Discharge Planning  Goal: Discharge to home or other facility with appropriate resources  Outcome: Adequate for Discharge     Problem: Safety - Adult  Goal: Free from fall injury  Outcome: Adequate for Discharge     Problem: ABCDS Injury Assessment  Goal: Absence of physical injury  Outcome: Adequate for Discharge     Problem: Skin/Tissue Integrity  Goal: Absence of new skin breakdown  Description: 1. Monitor for areas of redness and/or skin breakdown  2. Assess vascular access sites hourly  3. Every 4-6 hours minimum:  Change oxygen saturation probe site  4. Every 4-6 hours:  If on nasal continuous positive airway pressure, respiratory therapy assess nares and determine need for appliance change or resting period.   Outcome: Adequate for Discharge     Problem: Chronic Conditions and Co-morbidities  Goal: Patient's chronic conditions and co-morbidity symptoms are monitored and maintained or improved  Outcome: Adequate for Discharge     Problem: Pain  Goal: Verbalizes/displays adequate comfort level or baseline comfort level  Outcome: Adequate for Discharge

## 2022-11-23 NOTE — PLAN OF CARE
Problem: Discharge Planning  Goal: Discharge to home or other facility with appropriate resources  Outcome: Progressing  Flowsheets (Taken 11/22/2022 2100)  Discharge to home or other facility with appropriate resources: Identify barriers to discharge with patient and caregiver

## 2022-11-23 NOTE — PROGRESS NOTES
Pt discharged home with son with [facility]. No complaints of pain or no signs of distress. PICC line dressing changed. Son verbalized understanding of discharge instructions and f/u appts.

## 2022-11-23 NOTE — DISCHARGE INSTR - COC
Continuity of Care Form    Patient Name: Sincere Hall   :  1932  MRN:  [id number]    Admit date:  2022  Discharge date:  22    Code Status Order: Full Code   Advance Directives:     Admitting Physician:  Sis Mascorro DO  PCP: Peter Ball MD    Discharging Nurse: [place] Unit/Room#: X650/R597-84  Discharging Unit Phone Number: [phone]    Emergency Contact:   Extended Emergency Contact Information  Primary Emergency Contact: [address] [facility] [address] Phone: [phone]  Relation: Child  Secondary Emergency Contact: [address] [facility]  Mobile Phone: [phone]  Relation: Child    Past Surgical History:  Past Surgical History:   Procedure Laterality Date    IR INS PICC VAD W SQ PORT GREATER THAN 5  2022    IR INS PICC VAD W SQ PORT GREATER THAN 5 2022 [facility] SPECIAL PROCEDURE    JOINT REPLACEMENT Bilateral     knees    OTHER SURGICAL HISTORY Right 2022    cat scan guided abdominal mass aspiration with drain placement by Dr. Rm Luna Left 2021    LEFT PLEURAL CATHETER INSERTION performed by Veronica Enriquez MD at [place] [address] Left 2021    total of 755 cc removed per Dr Vani Murguia specimen sent to lab       Immunization History: There is no immunization history on file for this patient. 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 A    Thoracic aortic aneurysm without rupture I71.20    Descending aortic aneurysm (HCC) I71.9    Vertigo R42    Shortness of breath R06.02    Essential hypertension I10    Acute on chronic combined systolic and diastolic CHF (congestive heart failure) ([facility]) I50.43    Gait abnormality R26.9    A-fib ([facility]) I48.91    Pleural effusion J90    Hypoxia R09.02    Acute pulmonary edema ([facility]) J81.0    Acute on chronic combined systolic (congestive) and diastolic (congestive) heart failure ([facility]) I50.43    Acute cystitis with hematuria N30.01    Chronic diastolic CHF (congestive heart failure) ([facility]) I50.32    Right lower quadrant abdominal pain R10.31    Hyponatremia E87.1    Hypokalemia E87.6    Anemia D64.9    CHF (congestive heart failure) ([facility]) I50.9    Hypothyroid E03.9    Abdominal mass R19.00    Central retinal vein occlusion, left eye, stable H34.8122    Hx of drainage of abscess Z98.890    Abdominal pain R10.9    Streptococcus viridans infection A49.1    Aortic thrombus ([facility]) I74.10    Intra-abdominal abscess ([facility]) K65.1    Adnexal mass N94.89    Change or removal of drains Z48.03    Difficulty in walking R26.2    Muscle weakness (generalized) U65.34    Acute diastolic HF (heart failure) ([facility]) I50.31    CHF (congestive heart failure), NYHA class I, acute on chronic, combined ([facility]) I50.43    Gastro-esophageal reflux disease without esophagitis K21.9    Intracardiac thrombosis, not elsewhere classified I51.3    Impaired mobility and ADLs Z74.09, Z78.9    Palliative care encounter Z51.5    Advanced care planning/counseling discussion Z71.89    Goals of care, counseling/discussion Z71.89       Isolation/Infection:   Isolation            No Isolation          Patient Infection Status       Infection Onset Added Last Indicated Last Indicated By Review Planned Expiration Resolved Resolved By    None active    Resolved    C-diff Rule Out 07/24/22 07/24/22 07/24/22 Clostridium Difficile Toxin/Antigen (Ordered)   07/24/22 Rule-Out Test Resulted    COVID-19 (Rule Out) 02/19/21 02/19/21 02/19/21 COVID-19, Rapid (Ordered)   02/19/21 Rule-Out Test Resulted    COVID-19 (Rule Out) 02/10/21 02/10/21 02/10/21 COVID-19 (Ordered)   02/10/21 Rule-Out Test Resulted    COVID-19 (Rule Out) 02/09/21 02/09/21 02/09/21 COVID-19 (Ordered)   02/09/21 Rule-Out Test Resulted    COVID-19 (Rule Out) 02/02/21 02/02/21 02/02/21 COVID-19 (Ordered)   02/02/21 Rule-Out Test Resulted    COVID-19 (Rule Out) 01/25/21 01/25/21 01/26/21 COVID-19 (Ordered)   01/26/21 Rule-Out Test Resulted    COVID-19 (Rule Out) 01/25/21 01/25/21 01/25/21 COVID-19 (Ordered)   01/25/21 Rule-Out Test Resulted            Nurse Assessment:  Last Vital Signs: /77   Pulse 78   Temp 98.2 °F (36.8 °C) (Oral)   Resp 16   Ht 4' 11\" (1.499 m)   Wt 150 lb 6 oz (68.2 kg)   SpO2 94%   BMI 30.37 kg/m²     Last documented pain score (0-10 scale): Pain Level: 0  Last Weight:   Wt Readings from Last 1 Encounters:   11/23/22 150 lb 6 oz (68.2 kg)     Mental Status:  oriented    IV Access:  - PICC - site  R Antecubital, insertion date: 9/23/22    Nursing Mobility/ADLs:  Walking   Assisted  Transfer  Assisted  Bathing  Assisted  Dressing  Assisted  Toileting  Assisted  Feeding  Assisted  Med Admin  Assisted  Med Delivery   whole    Wound Care Documentation and Therapy:  Wound 11/11/22 Pelvis Anterior reddened abd (Active)   Wound Etiology Other 11/21/22 0938   Dressing/Treatment Open to air 11/22/22 0928   Drainage Amount None 11/22/22 0928   Odor None 11/22/22 0928   Number of days: 11       Incision 02/25/21 Abdomen Lateral;Left;Upper (Active)   Number of days: 636        Elimination:  Continence: Bowel: Yes  Bladder: Yes  Urinary Catheter: None   Colostomy/Ileostomy/Ileal Conduit: No       Date of Last BM: 11/23/22    Intake/Output Summary (Last 24 hours) at 11/23/2022 0856  Last data filed at 11/23/2022 0536  Gross per 24 hour   Intake 500 ml   Output --   Net 500 ml     I/O last 3 completed shifts:   In: 500 [P.O.:500]  Out: -     Safety Concerns:     None    Impairments/Disabilities:      None    Nutrition Therapy:  Current Nutrition Therapy:   - Oral Diet:  General    Routes of Feeding: Oral  Liquids: Thin Liquids  Daily Fluid Restriction: no  Last Modified Barium Swallow with Video (Video Swallowing Test): not done    Treatments at the Time of Hospital Discharge:   Respiratory Treatments: n/a  Oxygen Therapy:  is on oxygen at 3 L/min per nasal cannula. Ventilator:    - No ventilator support    Rehab Therapies: Physical Therapy and Occupational Therapy  Weight Bearing Status/Restrictions: No weight bearing restrictions  Other Medical Equipment (for information only, NOT a DME order):  wheelchair  Other Treatments: n/a    Patient's personal belongings (please select all that are sent with patient):  None    RN SIGNATURE:  Electronically signed by Shukri Garcia RN on 11/23/22 at 2:22 PM EST    CASE MANAGEMENT/SOCIAL WORK SECTION    Inpatient Status Date: Admitted to inpatient on 11/16/22    Readmission Risk Assessment Score:  Readmission Risk              Risk of Unplanned Readmission:  32           Discharging to Facility/ Agency   Name: [facility]  Address:  Phone: [phone]  Fax:    Dialysis Facility (if applicable)   Name:  Address:  Dialysis Schedule:  Phone:  Fax:    / signature: Electronically signed by KIN Ramsey LSW on 11/23/22 at 8:56 AM EST    PHYSICIAN SECTION    Prognosis: Good    Condition at Discharge: Stable    Rehab Potential (if transferring to Rehab):     Recommended Labs or Other Treatments After Discharge:     Physician Certification: I certify the above information and transfer of Car Blanchard  is necessary for the continuing treatment of the diagnosis listed and that she requires Home Care for 30 days.      Update Admission H&P: No change in H&P    PHYSICIAN SIGNATURE:  Gini Mendoza MD    Electronically signed by KIN Ramsey LSW on 11/23/22 at 8:56 AM EST

## 2022-11-23 NOTE — DISCHARGE SUMMARY
[id number] [facility] Course: The patient was admitted to the Rehabilitation Unit to address ADL and mobility deficits-as detailed above and below. The patient was enrolled in acute PT, OT program.  Weekly team meetings were held to assess functional progress toward their goals-and modify the therapy program.  The patient's medical, emotional, psychosocial and functional issues were addressed. The patient progressed in the rehab program and is now ready for discharge home. Refer to functional assessments summary report for detailed functional status. Refer to the medical problem list below to see the medical issues addressed. The social and DC complexities are detailed in the DC planning section below. Greater than 2   35 minutes was spent on coordinating patients discharge including follow-up care, medications and patient/family education. Extended time needed because of the potential use of controlled medications are high risk medications and a high risk population individual.  Patient and family were instructed to use lowest effective dose of these medications and slowly titrate off over the next 2 to 4 weeks. They are not to combine opiates with sedatives. I reviewed her [facility] prescription monitoring service data sheets in hopes of eliminating polypharmacy and weaning to the lowest effective dose of pain medications and eliminating the concomitant use of benzodiazepines. I see   no medications of concern. I see   no habits of combining sedatives and narcotics. 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 Other complicating issues include intra-abdominal abscess and is getting IV Invanz. RN  Assessment completed. VSS. Denied pain. LBM 11/21. Continues zosyn for recent intraabdominal abscess. Per ID note 11/11- stop date is 12/5. Per ID note 11/20, at least x2 more weeks of antibiotics. SL PICC in place- patent, but no blood return. No distress noted. Call light within reach and bed alarm activated. Electronically signed by Romina Jose RN on 11/22/2022 at 3:44 AM     ROS x10: The patient also complains of severely impaired mobility and activities of daily living. Otherwise no new problems with vision, hearing, nose, mouth, throat, dermal, cardiovascular, GI, , pulmonary, musculoskeletal, psychiatric or neurological. See also Acute Rehab PM&R H&P. Vital signs:  /77   Pulse 78   Temp 98.2 °F (36.8 °C) (Oral)   Resp 16   Ht 4' 11\" (1.499 m)   Wt 150 lb 6 oz (68.2 kg)   SpO2 94%   BMI 30.37 kg/m²   I/O:   PO/Intake:  fair PO intake,  reg diet low salt    Bowel:   continent   Bladder: continent   no taylor  General:  Patient is well developed,   adequately nourished, and    well kempt. HEENT:    Pupils equal, hearing intact to loud voice, external inspection of ear and nose benign. Inspection of lips, tongue and gums benign    Musculoskeletal: No significant change in strength or tone. All joints stable. Inspection and palpation of digits and nails show no clubbing, cyanosis or inflammatory conditions. Neuro/Psychiatric: Affect: flat but pleasant. Alert and oriented to person, place and situation with min cues. No significant change in deep tendon reflexes or sensation  Lungs:  Diminished, CTA-B. Respiration effort is   normal at rest.     Heart:   S1 = S2,   RRR. Abdomen:  Soft,  generalized mostly epigastric-tender, no enlargement of liver or spleen.   Extremities:   mild lower extremity edema    Skin:   Intact to general survey,  BUE bruises, old rlq drain site --no ss drainage    Rehabilitation:  Physical Therapy:   Bed mobility:  Bed mobility  Bridging: Independent (11/17/22 1018)  Rolling to Left: Modified independent (11/18/22 1007)  Rolling to Right: Modified independent (11/18/22 1007)  Supine to Sit: Minimal assistance (11/18/22 1026)  Sit to Supine: Modified independent (11/18/22 1007)  Scooting: Stand by assistance (11/17/22 1018)  Bed Mobility Comments: Pt requires increased time and effort. VCs for positioning once supine. Increased effort with sup to sit. Pt reports dizziness with initial sup to sit. (11/18/22 1026)  Bed Mobility  Overall Assistance Level: Supervision;Modified Independent (11/19/22 1024)  Additional Factors: Head of bed flat; With handrails (11/22/22 0458)  Overall Assistance Level: Supervision;Modified Independent (11/19/22 1024)  Additional Factors: Head of bed flat; With handrails (11/22/22 0952)  Roll Left  Assistance Level: Modified independent (11/22/22 0952)  Roll Right  Assistance Level: Modified independent (11/22/22 0952)  Sit to Supine  Assistance Level: Modified independent (11/22/22 0952)  Skilled Clinical Factors: on mat with rest break required after (11/19/22 1024)  Supine to Sit  Assistance Level: Modified independent (11/22/22 0952)  Skilled Clinical Factors: uses valsalva, cues to breathe through exertion (11/19/22 1024)  Scooting  Assistance Level: Modified independent (11/22/22 0952)  Transfers:  Transfers  Sit to Stand: Supervision (11/18/22 1019)  Stand to Sit: Supervision (11/18/22 1019)  Bed to Chair: Stand by assistance (11/17/22 0948)  Comment: toilet transfers SBA - cues for safety with O2 line management and reaching for surface in too great a distance (11/17/22 0948)  Transfers  Surface: To chair with arms;From chair with arms; Wheelchair (11/22/22 1359)  Additional Factors: Hand placement cues (11/19/22 1024)  Device: Juanna Kenning (11/22/22 1359)  Sit to Stand  Assistance Level:  Independent (11/23/22 0915)  Skilled Clinical Factors: uses safe and consistent hand placement (11/19/22 1320)  Stand to Sit  Assistance Level: Independent (11/23/22 0915)  Skilled Clinical Factors: patient needs cues for safe approach to chair with fatigue (11/19/22 1320)  Bed To/From Chair  Technique: Stand step (11/21/22 0928)  Assistance Level: Modified independent (11/22/22 0952)  Car Transfer  Assistance Level: Modified independent (11/22/22 1359)  Skilled Clinical Factors: Improved ability to problem solve with getting out or car for hand placement. (11/21/22 1421)  Gait:   Ambulation  Surface: Level tile (11/17/22 0949)  Device: Rolling Walker (11/17/22 3678)  Other Apparatus: AFO; Left;Right;O2 (11/17/22 1588)  Assistance: Stand by assistance;Minimal assistance (11/17/22 0949)  Quality of Gait: Assist to manage O2 line due to poor awareness, flexed trunk, fair maneuvering of walker (11/17/22 0949)  Gait Deviations: Slow Raji;Decreased step length (11/17/22 0949)  Distance: 30 feet (11/17/22 0949)  Comments: Pt with 3 L O2 in place. SOB following 30 feet - O2 sat at 92% (11/17/22 0949)  Ambulation  Surface: Carpet (11/23/22 0915)  Device: Rolling walker (11/23/22 0915)  Distance: 150' (11/23/22 0915)  Activity: Within Unit (11/23/22 0915)  Additional Factors: Right AFO; Left AFO; Increased time to complete (11/23/22 0915)  Assistance Level: Modified independent;Supervision (11/23/22 0915)  Gait Deviations: Slow raji (11/23/22 0915)  Skilled Clinical Factors: Mild unsteadiness this PM but no LOB (11/22/22 1359)  Stairs:  Stairs/Curb  Stairs?: Yes (11/17/22 0949)  Stairs  # Steps : 4 (11/17/22 0949)  Stairs Height: 6\" (11/17/22 0949)  Rails: Bilateral (11/17/22 0949)  Assistance:  Moderate assistance;Minimal assistance (11/17/22 0949)  Comment: mild post LOB with descent requiring recovery assistance; mild SOB following with O2 sat at 92% following with 3L O2 in place (11/17/22 0949)  Stairs  Stair Height: 6'' (11/23/22 0915)  Device: Bilateral handrails (11/23/22 0915)  Number of Stairs: 4 (11/23/22 0915)  Additional Factors: Non-reciprocal going up;Non-reciprocal going down (11/23/22 0915)  Assistance Level: Supervision (11/23/22 0915)  Skilled Clinical Factors: increased time, slow steady (11/23/22 0915)  Curb  Curb Height: 2'' (11/22/22 1359)  Device: Rolling walker (11/22/22 1359)  Number of Curbs: 1 (11/22/22 1359)  Additional Factors: Verbal cues; Non-reciprocal going up;Non-reciprocal going down (11/22/22 1359)  Assistance Level: Stand by assist;Contact guard assist (11/22/22 1359)  W/C mobility:       Occupational Therapy:   Hand Dominance: Right  ADL  Feeding: Independent (11/22/22 1406)  Grooming: Independent (11/22/22 1406)  UE Bathing: Independent (11/22/22 1406)  LE Bathing: Independent (11/22/22 1406)  UE Dressing: Independent (11/22/22 1406)  UE Dressing Skilled Clinical Factors: Declines bra, assist pulling shirt all the the way down in the back. (11/17/22 1011)  LE Dressing: Dependent/Total (11/17/22 1011)  LE Dressing Skilled Clinical Factors: Patient needed assist with socks, shoes and braces (11/22/22 1406)  Toileting: Modified independent  (11/22/22 1406)  Toileting Skilled Clinical Factors: Assist with pants (11/17/22 1011)  Additional Comments: Pt completed shower ADL as above. Fatigues quickly with frequent rest breaks (11/17/22 1011)  Toilet Transfers  Toilet - Technique: Ambulating (11/22/22 1410)  Equipment Used: Standard toilet (11/22/22 1410)  Toilet Transfer: Modified independent (11/22/22 1410)  Toilet Transfers Comments: Slow transfer, no AFOs d/t pt about to shower (11/17/22 1226)     Shower Transfers  Shower - Transfer From: [facility] (11/22/22 1410)  Shower - Transfer Type: To and From (11/22/22 1410)  Shower - Transfer To:  Shower seat with back (11/22/22 1410)  Shower - Technique: Ambulating (11/22/22 1410)  Shower Transfers: Supervision (11/22/22 1410)  Shower Transfers Comments: Increased time (11/17/22 1386)    Speech Therapy:            Diet/Swallow:                      Lab/X-ray studies reviewed, analyzed and discussed with patient and staff:   Recent Results (from the past 24 hour(s))   Magnesium    Collection Time: 11/23/22  5:10 AM   Result Value Ref Range    Magnesium 1.8 1.7 - 2.4 mg/dL       CTA CHEST  11/11/2022  No evidence of pulmonary embolism. Severe cardiomegaly with evidence of congestive heart failure manifesting as mild edema and bilateral trace pleural effusions as well as significant right heart dysfunction including partially visualized perihepatic ascites. CT GUIDED NEEDLE PLACEMENT1. Successful aspiration of left lower quadrant abdominal abscess. 2.Only a few drops of very thick gelatinous serosanguineous fluid with extensive solid debris was able to be aspirated. Due to this a drain was unable to be placed. Patient will be seen general surgery for evaluation and possible surgical intervention. CT ABDOMEN PELVIS      1. The right-sided abscess drain has been completely pulled out of the abscess and now lies in the right abdominal subcutaneous soft tissues. 2.The right lower abdominal abscess has increased in size when compared to prior study dating September 14 likely due to accumulation of infectious fluid due to displacement of the drainage catheter. 3.Again seen is a large cystic mass in the lower pelvis. ABDOMEN The visualized portion of the lung bases demonstrates a small effusion in the left thoracic cavity. The liver is of normal size and attenuation without focal lesions. The spleen is unremarkable. Kidneys demonstrate prompt enhancement and excretion of contrast without signs of hydronephrosis or focal mass. The pancreas and adrenals are unremarkable. The upper abdominal bowel loops appear normal. Again seen is ectasia of the visualized abdominal aorta and tortuosity, unchanged from prior. 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 There are no signs of upper abdominal fluid collections.  PELVIS  Again seen is a large abscess in the right lower abdomen/pelvis abutting the cecum now measuring 6.6 x 9.4 in transverse and AP dimensions, previously measuring 6 x 6.7 and AP and transverse dimensions. The previously seen abscess drain has now been pulled out of the abscess and lies in the subcutaneous soft tissues and is not contributing to any drainage of the abscess consistent with abscess enlargement. Again seen is a large cystic mass in the pelvis. XR CHEST  11/11/2022    The heart is enlarged. Bilateral aspect airspace disease is noted which could represent CHF or less likely pneumonia. There is no consolidation seen within the right lung base. There is left lung base consolidation which is favored to represent either atelectasis or a pleural effusion. Note is made of a right-sided PICC line. The catheter tip is at the level of the clavicle head. Cardiomegaly with multifocal bilateral airspace disease favored to represent CHF Left lung base consolidation which could represent atelectasis, pleural effusion or less likely pneumonia. .        Previous extensive, complex labs, notes and diagnostics reviewed and analyzed. ALLERGIES:    Allergies as of 11/16/2022 - Fully Reviewed 11/16/2022   Allergen Reaction Noted    Latex  07/19/2017    Tape [adhesive tape]  06/28/2016      (please also verify by checking MAR)     Today I evaluated this patient for periodic reassessment of medical and functional status. The patient was discussed in detail at the treatment team meeting focusing on current medical issues, progress in therapies, social issues, psychological issues, barriers to progress and strategies to address these barriers, and discharge planning. See the addendum to rehab progress note-as a second progress note in the chart.   The patient continues to be high risk for future disability and their medical and rehabilitation prognosis continue to be good and therefore, we will continue the patient's rehabilitation course as planned. The patient's tentative discharge date was set. Patient and family education was discussed. The patient was made aware of the team discussion regarding their progress. 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 Functional and medical status reassessed regarding patients ability to participate in therapies and patient found to be able to participate in acute intensive comprehensive inpatient rehabilitation program including PT/OT to improve balance, ambulation, ADLs, and to improve the P/AROM. Therapeutic modifications regarding activities in therapies, place, amount of time per day and intensity of therapy made daily. In bed therapies or bedside therapies prn. Bowel and Bladder dysfunction  , Neurogenic bowel and bladder:  frequent toileting, ambulate to bathroom with assistance, check post void residuals. Check for C.difficile x1 if >2 loose stools in 24 hours, continue bowel & bladder program.  Monitor bowel and bladder function. Lactinex 2 PO every AC. MOM prn, Brown Bomb prn, Glycerin suppository prn, enema prn. Encourage therapy and nursing to co-treat and problem solve re continence. Severe abd pain as well as generalized OA pain: reassess pain every shift and prior to and after each therapy session, give prn Tylenol and consider scheduled Tylenol, modalities prn in therapy, masage, Lidoderm, K-pad prn. Consider scheduled AM pain meds. Skin healing abd and breakdown risk:  continue pressure relief program.  Daily skin exams and reports from nursing. Fatigue due to nutritional and hydration deficiency: Add and titrate vitamin B12 vitamin D and CoQ10 continue to monitor I&Os, calorie counts prn, dietary consult prn. Add healthy snack at night.   Acute episodic insomnia with situational adjustment disorder:  prn Ambien, monitor for day time sedation. Falls risk elevated:  patient to use call light to get nursing assistance to get up, bed and chair alarm. Elevated DVT risk: progressive activities in PT, continue prophylaxis PORTILLO hose, elevation and  [place]alto . Complex discharge planning:  Weekly team meeting every Monday to re-assess progress towards goals, discuss and address social, psychological and medical comorbidities and to address difficulties they may be having progressing in therapy. Patient and family education is in progress. The patient is to follow-up with their family physician after discharge. Complex Active General Medical Issues that complicate care Assess & Plan:    Charcot-Arleen tooth neuropathy-bilateral AFOs   A-fib,   CHF (congestive heart failure,  Aortic thrombus   -Acute rehab to monitor heart rate and rhythm with the option of telemetry and the effects of chronotropic medication with respect to increasing physical activity and exercise in PT, OT, ADLs with medication titration to lowest effective dosing. Continue blood signs every shift focusing on heart rate, rhythm and blood pressure checks with orthostatic checks-monitoring the effect of exercise, therapy and posture. Consult hospitalist for backup medical and adjust/add medications ( Entresto, Xarelto, Lasix, Lanoxin, Coreg). Monitor heart rate and blood pressure as well as medications effects on vital signs before during and after therapy with especial focus on preventing orthostasis and falls risk. Lumbar stenosis with neurogenic claudication    Hypokalemia-check BMP and supplement as needed,      Right lower quadrant abdominal pain dt   Abdominal mass  Hx of drainage of abscess,  Streptococcus viridans infection-IV antibiotics patient and family have been trained. Reconsult infectious disease     Anemia-Monitor vital signs monitor for orthostasis and tachycardia, check H&H prn. Vitamin B12 and iron-dose iron with food to prevent GI upset. Monitor for constipation. Monitor stools for blood. Palliative care encounter-for pain as needed    Depression-emotional support provided daily, vitamin B12, encourage participation in rehabilitation support group and recreational therapy, adjust/add medications ( Celexa )  GERD-Elevate head of bed after meals, monitor stools for blood, lowest effective dose of PPI, consider Tums. Focus of today's plan-  Initiate and modify therapuetic plan to meet patients individual needs, add rest breaks as needed, and Focus on emotional health and caregiver involvement in care.   Cont plan dc planning  NATALIO Castorena corrected   Family  training for IV antibiotics at home  DC planning, see orders  Ratna Colvin D.O., PM&R     Attending    [address] [place], [place] [address]en [facility]

## 2022-11-23 NOTE — PROGRESS NOTES
[facility]   Acute  Rehabilitation  MUSIC THERAPY      Date:  11/23/2022        Patient Name: Marin Foley       MRN: [id number]        YOB: 1932 (80 y.o.)       Gender: female  Diagnosis: 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  Referring Practitioner: Dr Alyce Wei    RESTRICTIONS/PRECAUTIONS:  Restrictions/Precautions: Fall Risk     Hearing: Exceptions to [facility]  Hearing Exceptions: Hard of hearing/hearing concerns, No hearing aid      TIME OF SESSION: 10:30am - 10:40am     SUBJECTIVE:  \"Uh sure, maybe. If I no like I'll tell you stop. \"     OBJECTIVE:        [x] To Improve Mood     [x] To Increase Social Well-Being  [] To Increase Focus   [x] To Increase Emotional Well-Being [] To Increase Eye Contact    [] To Increase Spiritual Well-Being   [] To Decrease Anxiety   [x] To Increase Relaxation   [] To Decrease Pain    [] To Increase Communication  [] To Increase Movement to Music     MUSIC INTERVENTION PROVIDED:     [x] Live Music on Voice  [] Recorded Music   [x] Live Music on Guitar  [x] Discussion Related to Music   [] Live Music on Q-chord  [x] Discussion Related to Pt Experience   [] Live Music on Percussion      PARTICIPATION LEVEL OF PATIENT:     [x] Active with discussion   [] Passive with discussion   [] Active with singing    [x] Passive with singing   [] Active with instrument playing  [] Passive with instrument playing   [x] Actively listening to music   [] Passively listening to music.      OUTCOMES OBSERVED:      [x] Improved Mood   [x] Increased Social Well-Being  [] Increased Focus   [x] Increased Emotional Well-Being  [] Increased Eye Contact    [] Increased Spiritual Well-Being   [] Decreased Anxiety   [x] Increased Relaxation   [] Decreased Pain    [] Increased Communication   [] Increased Movement to Music     PAIN ASSESSMENT    Before MT:      [x] No     [] Yes   Location:    Rating:  /10    Comment(s):    After MT:         [x] No [] Yes   Location:     Rating:   /10    Comment(s):     ANXIETY ASSESSMENT    Before MT:      [x] No     [] Yes   Rating:  /10    Comment(s):    After MT:         [x] No     [] Yes   Rating:   /10    Comment(s):       ASSESSMENT/OBSERVATIONS:     Patient's music interests and/or background: all kinds     Patient tolerated todays treatment session:      [] Good          [] Fair          [x] Poor         Comment(s): Patient sitting up in wheelchair when [facility] arrived to offer music therapy services. Patient accepted music therapy visit, however did not seem very interested. Patient states, \"ok, but I tell you to stop if I don't want. \" MT-BC provided live, patient preferred music on guitar and voice. Patient actively engaged in the music therapy by and by actively listening to the music. Patient asked MT-BC to stop after the first song, stating she had a headache. It is unclear if patient had a headache prior to session, as she did not mention it when asked if she was having any pain. Patient very thankful for the MT-BC stopping by, however did not want any more music at this time. PLAN:      [] Pt interested in having music therapy again. [] [facility] will attempt to see pt again another day, if time allows. [] Pt's planned d/c date is before MT-BC is scheduled on unit again. [x] Pt NOT interested in having music therapy again.             Sim Tong, [facility]    11/23/2022  Electronically signed by Sim Tong on 11/23/2022 at 10:58 AM IV discontinued, cath removed intact MURPHY

## 2022-11-23 NOTE — PROGRESS NOTES
Progress Note  Patient: Calvin Rodriguez  Unit/Bed: X332/A705-87  YOB: 1932  MRN: [id number]  Acct: [de-identified]   Admitting Diagnosis: Impaired mobility and ADLs [Z74.09, Z78.9]  Admit Date:  11/16/2022  Hospital Day: 7    Chief Complaint: CHF    Histories:  Past Medical History:   Diagnosis Date    Ascending aortic aneurysm 6/23/2017    Charcot Arleen Tooth muscular atrophy     CHF (congestive heart failure) (HCC)     Chronic diastolic CHF (congestive heart failure) ([facility] [address] 4/1/2022    Depression     Descending aortic aneurysm ([facility] [address] 6/23/2017    Essential hypertension 2/16/2018    Headache     HTN (hypertension)     Impaired mobility and activities of daily living     Lumbar stenosis with neurogenic claudication     Myelopathy (HCC)     Osteoarthritis      Past Surgical History:   Procedure Laterality Date    IR INS PICC VAD W SQ PORT GREATER THAN 5  9/23/2022    IR INS PICC VAD W SQ PORT GREATER THAN 5 9/23/2022 [facility] SPECIAL PROCEDURE    JOINT REPLACEMENT Bilateral     knees    OTHER SURGICAL HISTORY Right 07/21/2022    cat scan guided abdominal mass aspiration with drain placement by Dr. Rm Luna Left 02/25/2021    LEFT PLEURAL CATHETER INSERTION performed by Veronica Enriquez MD at [place] [address] Left 01/29/2021    total of 755 cc removed per Dr Vani Murguia specimen sent to lab     Family History   Problem Relation Age of Onset    Arthritis Mother     Arthritis Father     High Blood Pressure Father      Social History     Socioeconomic History    Marital status:       Spouse name: None    Number of children: 2    Years of education: None    Highest education level: None   Tobacco Use    Smoking status: Never    Smokeless tobacco: Never   Vaping Use    Vaping Use: Never used   Substance and Sexual Activity    Alcohol use: No     Alcohol/week: 0.0 standard drinks    Drug use: No    Sexual activity: Not Currently   Social History Narrative    , Lives With: Alone, son lives down the street, dtr is in the area    [facility] [place]  in [address]: One level    Home Access: Stairs to enter with rails- Number of Steps: 2- Rails: Both    Bathroom Shower/Tub: Tub/Shower unit, Bathroom Equipment: Grab bars in shower, Shower chair    Home Equipment: Rolling walker, Cane(Pt infrequemtly uses DME for ambulation and prefers to furniture walk in home)    ADL Assistance: Independent, [facility] Responsibilities: Yes    Ambulation Assistance: Independent, Transfer Assistance: Independent    Additional Comments: Son stops over 2 times daily           Subjective/HPI pt seen in PT unit. Doing well overall. No  SOB today. Still w postprandial epigastric discomfort but seems better  Little dizzy when standing. No falls    EKG:        Review of Systems:   Review of Systems   Constitutional: Negative. Negative for diaphoresis and fatigue. HENT: Negative. Eyes: Negative. Respiratory: Negative. Negative for cough, chest tightness, shortness of breath, wheezing and stridor. Cardiovascular: Negative. Negative for chest pain, palpitations and leg swelling. Gastrointestinal:  Positive for abdominal pain. Negative for blood in stool and nausea. Genitourinary: Negative. Musculoskeletal:  Positive for gait problem. Skin: Negative. Neurological:  Positive for dizziness and weakness. Negative for syncope and light-headedness. Hematological: Negative. Psychiatric/Behavioral: Negative. Physical Examination:    /77   Pulse 78   Temp 98.2 °F (36.8 °C) (Oral)   Resp 16   Ht 4' 11\" (1.499 m)   Wt 150 lb 6 oz (68.2 kg)   SpO2 94%   BMI 30.37 kg/m²    Physical Exam   Constitutional: She appears healthy. No distress. HENT:   Normal cephalic and Atraumatic   Eyes: Pupils are equal, round, and reactive to light. Neck: Thyroid normal. No JVD present. No neck adenopathy. No thyromegaly present. Cardiovascular: Normal rate, regular rhythm, intact distal pulses and normal pulses. Murmur heard. Pulmonary/Chest: Effort normal. She has no rales. She has scattered wheezes. She exhibits no tenderness. Abdominal: Soft. Bowel sounds are normal. There is no abdominal tenderness. Musculoskeletal:         General: No tenderness or edema. Normal range of motion. Cervical back: Normal range of motion and neck supple. Neurological: She is alert and oriented to person, place, and time. Skin: Skin is warm. No cyanosis. Nails show no clubbing.      LABS:  CBC:   Lab Results   Component Value Date/Time    WBC 7.1 11/20/2022 03:33 PM    RBC 4.20 11/20/2022 03:33 PM    HGB 11.6 11/20/2022 03:33 PM    HCT 34.1 11/20/2022 03:33 PM    MCV 81.1 11/20/2022 03:33 PM    MCH 27.7 11/20/2022 03:33 PM    MCHC 34.2 11/20/2022 03:33 PM    RDW 17.5 11/20/2022 03:33 PM     11/20/2022 03:33 PM     CBC with Differential:    Lab Results   Component Value Date/Time    WBC 7.1 11/20/2022 03:33 PM    RBC 4.20 11/20/2022 03:33 PM    HGB 11.6 11/20/2022 03:33 PM    HCT 34.1 11/20/2022 03:33 PM     11/20/2022 03:33 PM    MCV 81.1 11/20/2022 03:33 PM    MCH 27.7 11/20/2022 03:33 PM    MCHC 34.2 11/20/2022 03:33 PM    RDW 17.5 11/20/2022 03:33 PM    BANDSPCT 2 07/24/2022 05:00 AM    METASPCT 2 07/24/2022 05:00 AM    LYMPHOPCT 13.8 11/17/2022 10:34 AM    MONOPCT 10.1 11/17/2022 10:34 AM    BASOPCT 0.7 11/17/2022 10:34 AM    MONOSABS 0.8 11/17/2022 10:34 AM    LYMPHSABS 1.0 11/17/2022 10:34 AM    EOSABS 0.3 11/17/2022 10:34 AM    BASOSABS 0.1 11/17/2022 10:34 AM     CMP:    Lab Results   Component Value Date/Time     11/20/2022 03:33 PM    K 4.1 11/20/2022 03:33 PM    K 4.1 11/20/2022 03:33 PM     11/20/2022 03:33 PM    CO2 27 11/20/2022 03:33 PM    BUN 14 11/20/2022 03:33 PM    CREATININE 0.60 11/20/2022 03:33 PM    GFRAA >60.0 10/11/2022 10:30 AM    LABGLOM >60.0 11/20/2022 03:33 PM    GLUCOSE 116 11/20/2022 03:33 PM    PROT 6.6 11/20/2022 03:33 PM    LABALBU 2.9 11/20/2022 03:33 PM    CALCIUM 8.9 11/20/2022 03:33 PM    BILITOT 0.4 11/20/2022 03:33 PM    ALKPHOS 57 11/20/2022 03:33 PM    AST 25 11/20/2022 03:33 PM    ALT 16 11/20/2022 03:33 PM     BMP:    Lab Results   Component Value Date/Time     11/20/2022 03:33 PM    K 4.1 11/20/2022 03:33 PM    K 4.1 11/20/2022 03:33 PM     11/20/2022 03:33 PM    CO2 27 11/20/2022 03:33 PM    BUN 14 11/20/2022 03:33 PM    LABALBU 2.9 11/20/2022 03:33 PM    CREATININE 0.60 11/20/2022 03:33 PM    CALCIUM 8.9 11/20/2022 03:33 PM    GFRAA >60.0 10/11/2022 10:30 AM    LABGLOM >60.0 11/20/2022 03:33 PM    GLUCOSE 116 11/20/2022 03:33 PM     Magnesium:    Lab Results   Component Value Date/Time    MG 1.8 11/23/2022 05:10 AM     Troponin:    Lab Results   Component Value Date/Time    TROPONINI <0.010 11/11/2022 12:30 PM        Active Hospital Problems    Diagnosis Date Noted    A-fib [facility]) [I48.91]      Priority: High    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00] 07/19/2022     Priority: Medium    Right lower quadrant abdominal pain [R10.31] 07/18/2022     Priority: Medium    Hypokalemia [E87.6] 07/18/2022     Priority: Medium    [facility]est[facility] failure) [facility] [I50.9] 07/18/2022     Priority: Medium    Anemia [D64.9] 07/18/2022     Priority: Medium    Lumbar stenosis with neurogenic claudication [M48.062] 06/02/2016     Priority: Medium    Depression [F32. A]      Priority: Low        Assessment/Plan:  chronic DHF-  She is Euvolemic. Continue PO Lasix. follow periodic labs. Echo EF 40 from prior 60. Likely related to AF.  conitnue Entresto and BB  SOB and Wheezing- we will order Albuterol inhaler   AF- Xarelto. Rate control added DIg. In SR    Ascending AO Aneurysm 5.3 cm - no plans for surgery per pt and daughter. She has Mural Thrombus. - on Xarelto  HTN stable   Abd Abscess and discomfort - on iv ABX. Continued discomfort. CAD Moderate LAD - no CP reported.    Elevated D Dimer- CT Chest - negative PE  PTOT Rehab    OK for DC- f/y Dr. Enrico Gallego       Electronically signed by Mario Madsen MD on 11/23/2022 at 10:54 AM

## 2022-11-23 NOTE — PROGRESS NOTES
Pt assessment completed at 2100. Pt denies pain. Assisted pt to bathroom SBA with some hand /walker placement cueing. . Pt voided yellow clear urine and medium soft BM. Pt on IV Zosyn till 12/5. PICC SL RUE patent, flushes easy but still no blood return. Plan pt D/C tomorrow. Call light within reach and bed alarm activated.

## 2022-11-23 NOTE — PROGRESS NOTES
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 Patient indicated once the task was done the discomfort was better. Education:   None during this session    Equipment recommendations:   None at this time      ASSESSMENT:   Patient ready for discharge to home      PLAN OF CARE:  Balance training, Functional mobility training, Endurance training, Strengthening, Pain management, Safety education & training, Patient/Caregiver education & training, Equipment evaluation, education, & procurement, Home management training, Self-Care / ADL  Continue with OT plan of care with discharge planned on 22    Patient goals : \"To go home. \"  Time Frame for Long Term Goals :  Within 1 week pt will improve in the following areas in order to reach specific goals as outlined in initial evaluation  Long Term Goal 1: Pt will improve ADL independence  Long Term Goal 2: Pt. will improve balance and safety with transfers  Long Term Goal 3: Pt will improve UE strength and endurance for ADL and transfer tasks        Therapy Time:   Individual Group Co-Treat   Time In 1130       Time Out 1200         Minutes 30                   Therapeutic activities: 30 minutes     Electronically signed by:    OSMANI Rogers/L,   11/23/2022, 3:52 PM

## 2022-11-23 NOTE — PROGRESS NOTES
Physical Therapy Rehab Treatment Note  [facility]Department[facility]  Room: X2/X272-89       NAME: Joellen Espinosa  : 1932 (80 y.o.)  MRN: [id number]  CODE STATUS: Full Code    Date of Service: 2022     Restrictions:  Restrictions/Precautions: Fall Risk     SUBJECTIVE:   Subjective: My stomach is upset    Pain  Pain: denies pre and post pain    OBJECTIVE:     Sit to Stand  Assistance Level: Independent  Stand to Sit  Assistance Level: Independent    Ambulation  Surface: Carpet  Device: Rolling walker  Distance: 150'  Activity: Within Unit  Additional Factors: Right AFO; Left AFO; Increased time to complete  Assistance Level: Modified independent;Supervision  Gait Deviations: Slow raji    Stairs  Stair Height: 6''  Device: Bilateral handrails  Number of Stairs: 4  Additional Factors: Non-reciprocal going up;Non-reciprocal going down  Assistance Level: Supervision  Skilled Clinical Factors: increased time, slow steady    Neuromuscular Education  Neuromuscular Comments: obsticle course, several turns 25 feet x  2 with pt able to manage O2 cord independently.     ASSESSMENT/PROGRESS TOWARDS GOALS:   Assessment  Assessment: Patient consistently meeting goals and shows good safety managing O2 line throughout therapy session  Activity Tolerance: Patient tolerated treatment well  Discharge Recommendations: Continue to assess pending progress;Home with Home health PT    Goals:  Long Term Goals  Long Term Goal 1: Pt to complete bed mobility with indep - met  Long Term Goal 2: Pt to complete transfers with indep - met  Long Term Goal 3: Pt to ambulate 50-150ft with LRD and Supervision - indep for 20 feet with ability to manage O2 line - met  Long Term Goal 4: Pt to complete 2 steps with HR and CGA - met    PLAN OF CARE/Safety:   Safety Devices  Type of Devices: Left in chair;Chair alarm in place      Therapy Time:   Individual   Time In 0900   Time Out 0930   Minutes 30     Minutes: 30  Transfer/Bed mobility trainin  Gait training: 15  Neuro re education: [place], [place], 22 at 10:47 AM

## 2022-11-28 LAB
ALBUMIN SERPL-MCNC: 3.3 G/DL (ref 3.5–4.6)
ALP BLD-CCNC: 63 U/L (ref 40–130)
ALT SERPL-CCNC: 18 U/L (ref 0–33)
ANION GAP SERPL CALCULATED.3IONS-SCNC: 10 MEQ/L (ref 9–15)
AST SERPL-CCNC: 32 U/L (ref 0–35)
BASOPHILS ABSOLUTE: 0.1 K/UL (ref 0–0.2)
BASOPHILS RELATIVE PERCENT: 1.1 %
BILIRUB SERPL-MCNC: 0.5 MG/DL (ref 0.2–0.7)
BUN BLDV-MCNC: 14 MG/DL (ref 8–23)
C-REACTIVE PROTEIN: 27.2 MG/L (ref 0–5)
CALCIUM SERPL-MCNC: 9.3 MG/DL (ref 8.5–9.9)
CHLORIDE BLD-SCNC: 100 MEQ/L (ref 95–107)
CO2: 27 MEQ/L (ref 20–31)
CREAT SERPL-MCNC: 0.55 MG/DL (ref 0.5–0.9)
EOSINOPHILS ABSOLUTE: 0.3 K/UL (ref 0–0.7)
EOSINOPHILS RELATIVE PERCENT: 3.8 %
GFR SERPL CREATININE-BSD FRML MDRD: >60 ML/MIN/{1.73_M2}
GLOBULIN: 3.8 G/DL (ref 2.3–3.5)
GLUCOSE BLD-MCNC: 121 MG/DL (ref 70–99)
HCT VFR BLD CALC: 38.1 % (ref 37–47)
HEMOGLOBIN: 12.1 G/DL (ref 12–16)
LYMPHOCYTES ABSOLUTE: 0.9 K/UL (ref 1–4.8)
LYMPHOCYTES RELATIVE PERCENT: 12.9 %
MCH RBC QN AUTO: 26.5 PG (ref 27–31.3)
MCHC RBC AUTO-ENTMCNC: 31.9 % (ref 33–37)
MCV RBC AUTO: 83.3 FL (ref 79.4–94.8)
MONOCYTES ABSOLUTE: 0.6 K/UL (ref 0.2–0.8)
MONOCYTES RELATIVE PERCENT: 8.1 %
NEUTROPHILS ABSOLUTE: 5.2 K/UL (ref 1.4–6.5)
NEUTROPHILS RELATIVE PERCENT: 74.1 %
PDW BLD-RTO: 16.9 % (ref 11.5–14.5)
PLATELET # BLD: 225 K/UL (ref 130–400)
POTASSIUM SERPL-SCNC: 3.5 MEQ/L (ref 3.4–4.9)
RBC # BLD: 4.57 M/UL (ref 4.2–5.4)
SEDIMENTATION RATE, ERYTHROCYTE: 41 MM (ref 0–30)
SODIUM BLD-SCNC: 137 MEQ/L (ref 135–144)
TOTAL PROTEIN: 7.1 G/DL (ref 6.3–8)
WBC # BLD: 7 K/UL (ref 4.8–10.8)

## 2022-11-28 NOTE — PROGRESS NOTES
Physical Therapy  Facility/Department: [facility]  Rehabilitation Discharge note    NAME: Joanne Ryan  : 1932  MRN: [id number]    Date of discharge: 22      Past Medical History:   Diagnosis Date    Ascending aortic aneurysm 2017    Charcot Arleen Tooth muscular atrophy     CHF (congestive heart failure) (HCC)     Chronic diastolic CHF (congestive heart failure) ([facility] [address] 2022    Depression     Descending aortic aneurysm ([facility] [address] 2017    Essential hypertension 2018    Headache     HTN (hypertension)     Impaired mobility and activities of daily living     Lumbar stenosis with neurogenic claudication     Myelopathy (HCC)     Osteoarthritis      Past Surgical History:   Procedure Laterality Date    IR INS PICC VAD W SQ PORT GREATER THAN 5  2022    IR INS PICC VAD W SQ PORT GREATER THAN 5 2022 [facility] SPECIAL PROCEDURE    JOINT REPLACEMENT Bilateral     knees    OTHER SURGICAL HISTORY Right 2022    cat scan guided abdominal mass aspiration with drain placement by Dr. Radames Cox Left 2021    LEFT PLEURAL CATHETER INSERTION performed by Geronimo Jones MD at [place] [id number] Left 2021    total of 755 cc removed per Dr Pablo Push specimen sent to lab       Restrictions  Restrictions/Precautions  Restrictions/Precautions: Fall Risk    Objective  Bed Mobility  Additional Factors: Head of bed flat; With handrails  Roll Left  Assistance Level: Modified independent  Roll Right  Assistance Level: Modified independent  Sit to Supine  Assistance Level: Modified independent  Supine to Sit  Assistance Level: Modified independent  Scooting  Assistance Level: Modified independent    Transfers  Surface: To chair with arms;From chair with arms; Wheelchair  Device: Walker  Sit to [employer] Level: Modified independent  Stand to [employer] Level: Modified independent  Bed To/From Chair  Assistance Level: Modified independent  Car Transfer  Assistance Level: Modified independent     Ambulation  Surface: Carpet  Device: Rolling walker  Distance: 50' x 2  Activity: Within Unit  Additional Factors: Right AFO; Left AFO; Increased time to complete  Assistance Level: Modified independent;Supervision  Gait Deviations: Slow raji  Skilled Clinical Factors: Mild unsteadiness this PM but no LOB     Stairs  Number of Stairs: 4  Additional Factors: Non-reciprocal going up;Non-reciprocal going down  Assistance Level: Supervision  Skilled Clinical Factors: increased time, slow steady  Curb  Curb Height: 2''  Device: Rolling walker  Number of Curbs: 1  Additional Factors: Verbal cues; Non-reciprocal going up;Non-reciprocal going down  Assistance Level: Stand by assist;Contact guard assist      Pt/ family education/training: Pt has been educated in all mobility tasks. She has been consistent with follow thru as listed above    Assessment: Pt has made excellent gains. She has met goals as listed.        LTG established:  Long Term Goal 1: Pt to complete bed mobility with indep - met  Long Term Goal 2: Pt to complete transfers with indep - met  Long Term Goal 3: Pt to ambulate 50-150ft with LRD and Supervision - indep for 20 feet with ability to manage O2 line - met  Long Term Goal 4: Pt to complete 2 steps with HR and CGA - met    Discharge Plan:  Dc to home with Yadiel Lee recommended      Electronically signed by Winston Hernandez PT on 11/28/2022 at 2:01 PM

## 2022-12-05 ENCOUNTER — OFFICE VISIT (OUTPATIENT)
Dept: INFECTIOUS DISEASES | Age: 87
End: 2022-12-05
Payer: MEDICARE

## 2022-12-05 VITALS
SYSTOLIC BLOOD PRESSURE: 117 MMHG | TEMPERATURE: 97.1 F | HEIGHT: 59 IN | RESPIRATION RATE: 20 BRPM | BODY MASS INDEX: 30.37 KG/M2 | OXYGEN SATURATION: 96 % | DIASTOLIC BLOOD PRESSURE: 77 MMHG | HEART RATE: 73 BPM

## 2022-12-05 DIAGNOSIS — K65.1 INTRA-ABDOMINAL ABSCESS (HCC): Primary | ICD-10-CM

## 2022-12-05 PROCEDURE — 1090F PRES/ABSN URINE INCON ASSESS: CPT | Performed by: INTERNAL MEDICINE

## 2022-12-05 PROCEDURE — G8417 CALC BMI ABV UP PARAM F/U: HCPCS | Performed by: INTERNAL MEDICINE

## 2022-12-05 PROCEDURE — G8427 DOCREV CUR MEDS BY ELIG CLIN: HCPCS | Performed by: INTERNAL MEDICINE

## 2022-12-05 PROCEDURE — 1124F ACP DISCUSS-NO DSCNMKR DOCD: CPT | Performed by: INTERNAL MEDICINE

## 2022-12-05 PROCEDURE — 99213 OFFICE O/P EST LOW 20 MIN: CPT | Performed by: INTERNAL MEDICINE

## 2022-12-05 PROCEDURE — G8484 FLU IMMUNIZE NO ADMIN: HCPCS | Performed by: INTERNAL MEDICINE

## 2022-12-05 PROCEDURE — 1036F TOBACCO NON-USER: CPT | Performed by: INTERNAL MEDICINE

## 2022-12-05 PROCEDURE — 1111F DSCHRG MED/CURRENT MED MERGE: CPT | Performed by: INTERNAL MEDICINE

## 2022-12-05 ASSESSMENT — PATIENT HEALTH QUESTIONNAIRE - PHQ9
SUM OF ALL RESPONSES TO PHQ9 QUESTIONS 1 & 2: 0
1. LITTLE INTEREST OR PLEASURE IN DOING THINGS: 0
SUM OF ALL RESPONSES TO PHQ QUESTIONS 1-9: 0
2. FEELING DOWN, DEPRESSED OR HOPELESS: 0

## 2022-12-05 ASSESSMENT — ENCOUNTER SYMPTOMS
RESPIRATORY NEGATIVE: 1
GASTROINTESTINAL NEGATIVE: 1

## 2022-12-05 NOTE — PROGRESS NOTES
Infectious Disease     Patient Name: Kobe Fraser  Date: 12/5/2022  YOB: 1932  Medical Record Number: [id number]      Intra-abdominal abscess      History of intra-abdominal abscess drained percutaneously treated with Invanz switch to Zosyn not resolved after an extended course of therapy repeat CT scan showed persistent abscess     Patient now to continue a prolonged course of IV antibiotics with Zosyn      Impression   1. Successful aspiration of left lower quadrant abdominal abscess. 2.Only a few drops of very thick gelatinous serosanguineous fluid with extensive solid debris was able to be aspirated. Due to this a drain was unable to be placed. Patient will be seen general surgery for evaluation and possible surgical intervention. HISTORY: Scott Condon is a Female of 80 years age. DIAGNOSIS:  K65.1 Intra-abdominal abscess (HCC) ICD10       COMPARISON: None available. CT Dose-Length Product (estimate related to radiation exposure from this exam):  710.1  mGy*cm. PROCEDURE:   Following the discussion of the procedure, alternatives, risks versus benefits, informed consent was obtained from the patient. Specifically, risks of after-biopsy pain at the site, rare possibility of excessive hemorrhage, infection, injury to the    adjacent organs were discussed and the patient verbalized understanding. Pre-procedure evaluation confirmed that the patient was an appropriate candidate for conscious    sedation. Adequate sedation was maintained during the entire procedure. Vital signs, pulse    oximetry, and response to verbal commands were monitored and recorded by the nurse throughout the    procedure and the recovery period. Medical information was entered in the medical record including the    medications and dosages used. The patient returned to baseline neurologic and physiologic status    prior to leaving the department.  No immediate sedation related complications were noted. Medication    for conscious sedation was administered via IV route. 45 minutes of conscious sedation was    provided. Following universal protocol, patient and site verification was performed with a \"timeout\" prior to the procedure. The patient was placed on the CT table in supine  position and the right lateral abdomen area was prepped and draped in usual sterile fashion. Using the usual sterile conditions, lidocaine and CT guidance, the complex septated abscess in the right lower    quadrant of the abdomen was accessed using a 4 Y[place]ut aspiration catheter. After confirmation of appropriate localization of the needle, aspiration was attempted, though the abscess was mostly solid with very thick gelatinous material with extensive    solid debris and only a few drops were aspirated. The drainage catheter was removed and hemostasis was achieved by direct digital compression. The patient tolerated the procedure well. No immediate complications identified. The patient left the CT    suite in supine position to the recovery room in stable condition. Total local anesthetic used: lidocaine, approximately 8 mL. Estimated blood loss:  Negligible. The patient was observed in the recovery room for two hours after the procedure and discharged in stable condition. 11/2/22 0708     CULTURE WOUND Direct Exam:  NO NEUTROPHILS SEEN   Direct Exam:  NO BACTERIA SEEN   Cult,Aerobe/Anaerobe:  NO GROWTH 5 DAYS   Performed at [facility], [address] [place] [address]   ([address] [facility]           Narrative  Performed by: [address]  ORDER#: [id number]                          ORDERED BY: Jann Beck   SOURCE: Abscess Abdomen                    COLLECTED:  11/02/22 07:08                              Review of Systems   Constitutional: Negative. Respiratory: Negative. Cardiovascular: Negative. Gastrointestinal: Negative.       Review of Systems: All 14 review of systems negative other than as stated above    Social History     Tobacco Use    Smoking status: Never    Smokeless tobacco: Never   Vaping Use    Vaping Use: Never used   Substance Use Topics    Alcohol use: No     Alcohol/week: 0.0 standard drinks    Drug use: No         Past Medical History:   Diagnosis Date    Ascending aortic aneurysm 6/23/2017    Charcot Arleen Tooth muscular atrophy     CHF (congestive heart failure) (HCC)     Chronic diastolic CHF (congestive heart failure) ([facility] [address] 4/1/2022    Depression     Descending aortic aneurysm ([facility] [address] 6/23/2017    Essential hypertension 2/16/2018    Headache     HTN (hypertension)     Impaired mobility and activities of daily living     Lumbar stenosis with neurogenic claudication     Myelopathy (HCC)     Osteoarthritis            Past Surgical History:   Procedure Laterality Date    IR INS PICC VAD W SQ PORT GREATER THAN 5  9/23/2022    IR INS PICC VAD W SQ PORT GREATER THAN 5 9/23/2022 [facility] SPECIAL PROCEDURE    JOINT REPLACEMENT Bilateral     knees    OTHER SURGICAL HISTORY Right 07/21/2022    cat scan guided abdominal mass aspiration with drain placement by Dr. Joleen Zuleta Left 02/25/2021    LEFT PLEURAL CATHETER INSERTION performed by Jaye uJdd MD at [place] [id number] Left 01/29/2021    total of 755 cc removed per Dr Theda Mcardle specimen sent to lab         Current Outpatient Medications on File Prior to Visit   Medication Sig Dispense Refill    piperacillin-tazobactam (ZOSYN) infusion Infuse 13.5 g intravenously Over 24 hours for 20 days Please use elastomeric infusion device 270 g 0    albuterol sulfate HFA (PROVENTIL;VENTOLIN;PROAIR) 108 (90 Base) MCG/ACT inhaler Inhale 2 puffs into the lungs every 6 hours as needed for Wheezing 18 g 3    digoxin (LANOXIN) 125 MCG tablet Take 1 tablet by mouth daily 30 tablet 3    sacubitril-valsartan (ENTRESTO) 24-26 MG per tablet Take 1 tablet by mouth 2 times daily 60 tablet 1    sucralfate (CARAFATE) 1 GM tablet Take 1 tablet by mouth 4 times daily 120 tablet 3    Lactobacillus TABS Take by mouth daily      rivaroxaban (XARELTO) 15 MG TABS tablet Take 1 tablet by mouth daily 90 tablet 3    meclizine (ANTIVERT) 25 MG tablet Take by mouth daily      Vitamin D (CHOLECALCIFEROL) 50 MCG (2000 UT) TABS tablet Take 1 tablet by mouth Daily with supper (Patient taking differently: Take 2,000 Units by mouth Daily with supper Indications: Nutritional Support) 60 tablet 5    potassium chloride (KLOR-CON M) 20 MEQ extended release tablet Take 20 mEq by mouth daily (with breakfast) Indications: Nutritional Support      furosemide (LASIX) 20 MG tablet TAKE 1 TABLET DAILY (Patient taking differently: Take 20 mg by mouth daily Indications: Congestive Heart Failure) 90 tablet 3    [DISCONTINUED] amLODIPine (NORVASC) 5 MG tablet Take 1 tablet by mouth daily 90 tablet 3    pantoprazole (PROTONIX) 40 MG tablet TAKE 1 TABLET DAILY (Patient taking differently: Take 40 mg by mouth daily Indications: Ulcer) 90 tablet 3    carvedilol (COREG) 6.25 MG tablet TAKE 1 TABLET TWICE A DAY (Patient taking differently: Take 6.25 mg by mouth 2 times daily Indications: High Blood Pressure Disorder) 180 tablet 3    nitroGLYCERIN (NITROSTAT) 0.4 MG SL tablet up to max of 3 total doses. If no relief after 1 dose, call 911. (Patient taking differently: Place 0.4 mg under the tongue every 5 minutes as needed Indications: Chest Pain up to max of 3 total doses.  If no relief after 1 dose, call 911.) 25 tablet 3    ferrous sulfate (IRON 325) 325 (65 Fe) MG tablet Take 1 tablet by mouth 2 times daily (with meals) (Patient taking differently: Take 325 mg by mouth 2 times daily (with meals) Indications: Anemia) 30 tablet 3    Carboxymethylcellulose Sodium (EYE DROPS OP) Apply 1 drop to eye as needed (Dry eyes) Indications: Systane       Boswellia-Glucosamine-Vit D (OSTEO BI-FLEX ONE PER DAY) TABS Take 1 tablet by mouth daily Indications: Nutritional Support      Multiple Vitamins-Minerals (CENTRUM SILVER ULTRA WOMENS) TABS Take 1 tablet by mouth daily Indications: Nutritional Support      vitamin B-12 (CYANOCOBALAMIN) 1000 MCG tablet Take 1,000 mcg by mouth daily Indications: Nutritional Support      citalopram (CELEXA) 20 MG tablet Take 20 mg by mouth daily Indications: Depression      SYNTHROID 88 MCG tablet Take 88 mcg by mouth daily Indications: Underactive Thyroid       No current facility-administered medications on file prior to visit. Allergies   Allergen Reactions    Latex     Tape Jacqulin Keto Tape]          Family History   Problem Relation Age of Onset    Arthritis Mother     Arthritis Father     High Blood Pressure Father          Physical Exam:      Physical Exam  Constitutional:       Appearance: She is not ill-appearing. Cardiovascular:      Heart sounds: Normal heart sounds. No murmur heard. Pulmonary:      Effort: Pulmonary effort is normal. No respiratory distress. Breath sounds: Normal breath sounds. No stridor. No wheezing, rhonchi or rales. Chest:      Chest wall: No tenderness. Abdominal:      General: Abdomen is flat. Bowel sounds are normal. There is no distension. Palpations: There is no mass. Tenderness: There is no abdominal tenderness. There is no right CVA tenderness, left CVA tenderness, guarding or rebound. Hernia: No hernia is present. There were no vitals taken for this visit.       .   Lab Results   Component Value Date    WBC 7.0 11/28/2022    HGB 12.1 11/28/2022    HCT 38.1 11/28/2022    MCV 83.3 11/28/2022     11/28/2022     Lab Results   Component Value Date/Time     11/28/2022 02:00 PM    K 3.5 11/28/2022 02:00 PM    K 4.1 11/20/2022 03:33 PM     11/28/2022 02:00 PM    CO2 27 11/28/2022 02:00 PM    BUN 14 11/28/2022 02:00 PM    CREATININE 0.55 11/28/2022 02:00 PM    GLUCOSE 121 11/28/2022 02:00 PM    CALCIUM 9.3 11/28/2022 02:00 PM        Component Ref Range & Units 11/28/22 1400    Sed Rate 0 - 30 mm 41 High       Ref Range & Units 11/28/22 1400 10/26/22 1030 10/19/22 1030 10/11/22 1003 10/4/22 1359 9/27/22 1110    CRP 0.0 - 5.0 mg/L 27.2 High   42.9 High   37.8 High   28.8 High   35.5 High   20.2 High     Resulting Agency  [address] [place] [address] Lab         ASSESSMENT:  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    Headache    Depression    Thoracic aortic aneurysm without rupture    Descending aortic aneurysm (HCC)    Vertigo    Shortness of breath    Essential hypertension    Acute on chronic combined systolic and diastolic CHF (congestive heart failure) ([facility])    Gait abnormality    A-fib ([facility])    Pleural effusion    Hypoxia    Acute pulmonary edema (HCC)    Acute on chronic combined systolic (congestive) and diastolic (congestive) heart failure (HCC)    Acute cystitis with hematuria    Chronic diastolic CHF (congestive heart failure) (HCC)    Right lower quadrant abdominal pain    Hyponatremia    Hypokalemia    Anemia    CHF (congestive heart failure) (HCC)    Hypothyroid    Abdominal mass    Central retinal vein occlusion, left eye, stable    Hx of drainage of abscess    Abdominal pain    Streptococcus viridans infection    Aortic thrombus (HCC)    Intra-abdominal abscess (HCC)    Adnexal mass    Change or removal of drains    Difficulty in walking    Muscle weakness (generalized)    Acute diastolic HF (heart failure) (HCC)    CHF (congestive heart failure), NYHA class I, acute on chronic, combined ([facility] [address]    Gastro-esophageal reflux disease without esophagitis    Intracardiac thrombosis, not elsewhere classified    Impaired mobility and ADLs    Palliative care encounter    Advanced care planning/counseling discussion    Goals of care, counseling/discussion         PLAN:  Intra-abdominal abscess     Patient has had a prolonged course  Patient did not go for an evaluation at the [facility] clinic according to her son they were unable to place a percutaneous drain but did offer an option for surgery the patient has refused that at this time      Continue patient Zosyn obtain CT scan of abdomen to evaluate size of abscess follow-up 1 week

## 2022-12-06 ENCOUNTER — HOSPITAL ENCOUNTER (OUTPATIENT)
Dept: CT IMAGING | Age: 87
Discharge: HOME | End: 2022-12-08
Payer: MEDICARE

## 2022-12-06 ENCOUNTER — TELEPHONE (OUTPATIENT)
Dept: INFECTIOUS DISEASES | Age: 87
End: 2022-12-06

## 2022-12-06 DIAGNOSIS — K65.1 INTRA-ABDOMINAL ABSCESS (HCC): Primary | ICD-10-CM

## 2022-12-06 DIAGNOSIS — K65.1 INTRA-ABDOMINAL ABSCESS (HCC): ICD-10-CM

## 2022-12-06 LAB
ALBUMIN SERPL-MCNC: 3.2 G/DL (ref 3.5–4.6)
ALP BLD-CCNC: 59 U/L (ref 40–130)
ALT SERPL-CCNC: 15 U/L (ref 0–33)
ANION GAP SERPL CALCULATED.3IONS-SCNC: 10 MEQ/L (ref 9–15)
AST SERPL-CCNC: 35 U/L (ref 0–35)
BASOPHILS ABSOLUTE: 0 K/UL (ref 0–0.2)
BASOPHILS RELATIVE PERCENT: 0.6 %
BILIRUB SERPL-MCNC: 0.4 MG/DL (ref 0.2–0.7)
BUN BLDV-MCNC: 15 MG/DL (ref 8–23)
C-REACTIVE PROTEIN: 32.5 MG/L (ref 0–5)
CALCIUM SERPL-MCNC: 9.3 MG/DL (ref 8.5–9.9)
CHLORIDE BLD-SCNC: 98 MEQ/L (ref 95–107)
CO2: 27 MEQ/L (ref 20–31)
CREAT SERPL-MCNC: 0.55 MG/DL (ref 0.5–0.9)
EOSINOPHILS ABSOLUTE: 0.2 K/UL (ref 0–0.7)
EOSINOPHILS RELATIVE PERCENT: 2.6 %
GFR SERPL CREATININE-BSD FRML MDRD: >60 ML/MIN/{1.73_M2}
GLOBULIN: 3.9 G/DL (ref 2.3–3.5)
GLUCOSE BLD-MCNC: 163 MG/DL (ref 70–99)
HCT VFR BLD CALC: 36.1 % (ref 37–47)
HEMOGLOBIN: 12 G/DL (ref 12–16)
LYMPHOCYTES ABSOLUTE: 0.9 K/UL (ref 1–4.8)
LYMPHOCYTES RELATIVE PERCENT: 11.2 %
MCH RBC QN AUTO: 27.3 PG (ref 27–31.3)
MCHC RBC AUTO-ENTMCNC: 33.2 % (ref 33–37)
MCV RBC AUTO: 82.4 FL (ref 79.4–94.8)
MONOCYTES ABSOLUTE: 0.7 K/UL (ref 0.2–0.8)
MONOCYTES RELATIVE PERCENT: 8.9 %
NEUTROPHILS ABSOLUTE: 6 K/UL (ref 1.4–6.5)
NEUTROPHILS RELATIVE PERCENT: 76.7 %
PDW BLD-RTO: 16.9 % (ref 11.5–14.5)
PLATELET # BLD: 208 K/UL (ref 130–400)
POTASSIUM SERPL-SCNC: 3.5 MEQ/L (ref 3.4–4.9)
RBC # BLD: 4.38 M/UL (ref 4.2–5.4)
SEDIMENTATION RATE, ERYTHROCYTE: 28 MM (ref 0–30)
SODIUM BLD-SCNC: 135 MEQ/L (ref 135–144)
TOTAL PROTEIN: 7.1 G/DL (ref 6.3–8)
WBC # BLD: 7.8 K/UL (ref 4.8–10.8)

## 2022-12-06 PROCEDURE — A4641 RADIOPHARM DX AGENT NOC: HCPCS | Performed by: INTERNAL MEDICINE

## 2022-12-06 PROCEDURE — 74177 CT ABD & PELVIS W/CONTRAST: CPT

## 2022-12-06 PROCEDURE — 6360000004 HC RX CONTRAST MEDICATION: Performed by: INTERNAL MEDICINE

## 2022-12-06 RX ADMIN — BARIUM SULFATE 450 ML: 20 SUSPENSION ORAL at 15:41

## 2022-12-06 RX ADMIN — IOPAMIDOL 50 ML: 612 INJECTION, SOLUTION INTRAVENOUS at 15:41

## 2022-12-06 NOTE — FLOWSHEET NOTE
Pt came in as outpt for CT scan ordered by Dr. Karolina Reardon and Radiology RN called by CT staff to assess PICC line. RAE PICC line was found to be out ~5-6cm and looped neatly under CHG dressing. PICC line flushes easily, but no blood return and imaging on 11/11/22 show PICC not in original placement from when placed 9/23/22. Mercedes and Infectious Disease office called and notified and she will let Dr. Karolina Reardon know to see if he wants PICC exchanged. Pt son updated as well and he states Yadiel Lee RN told him it was out but ok to use still. Electronically signed by Kecia Mcneal RN on 12/6/2022 at 3:46 PM

## 2022-12-07 ENCOUNTER — HOSPITAL ENCOUNTER (OUTPATIENT)
Dept: INTERVENTIONAL RADIOLOGY/VASCULAR | Age: 87
Discharge: HOME | End: 2022-12-09
Payer: MEDICARE

## 2022-12-07 DIAGNOSIS — K65.1 INTRA-ABDOMINAL ABSCESS (HCC): ICD-10-CM

## 2022-12-07 PROCEDURE — 36584 COMPL RPLCMT PICC RS&I: CPT

## 2022-12-07 PROCEDURE — 2580000003 HC RX 258: Performed by: INTERNAL MEDICINE

## 2022-12-07 PROCEDURE — 2500000003 HC RX 250 WO HCPCS: Performed by: INTERNAL MEDICINE

## 2022-12-07 PROCEDURE — 36584 COMPL RPLCMT PICC RS&I: CPT | Performed by: RADIOLOGY

## 2022-12-07 PROCEDURE — 2709999900 IR FLUORO GUIDED CVA DEVICE REPLACEMENT

## 2022-12-07 RX ORDER — SODIUM CHLORIDE 9 MG/ML
250 INJECTION, SOLUTION INTRAVENOUS ONCE
Status: COMPLETED | OUTPATIENT
Start: 2022-12-07 | End: 2022-12-07

## 2022-12-07 RX ORDER — SODIUM CHLORIDE 0.9 % (FLUSH) 0.9 %
5-40 SYRINGE (ML) INJECTION PRN
Status: DISCONTINUED | OUTPATIENT
Start: 2022-12-07 | End: 2022-12-10 | Stop reason: HOSPADM

## 2022-12-07 RX ORDER — SODIUM CHLORIDE 0.9 % (FLUSH) 0.9 %
5-40 SYRINGE (ML) INJECTION EVERY 12 HOURS SCHEDULED
Status: DISCONTINUED | OUTPATIENT
Start: 2022-12-07 | End: 2022-12-10 | Stop reason: HOSPADM

## 2022-12-07 RX ORDER — SODIUM CHLORIDE 9 MG/ML
INJECTION, SOLUTION INTRAVENOUS PRN
Status: DISCONTINUED | OUTPATIENT
Start: 2022-12-07 | End: 2022-12-10 | Stop reason: HOSPADM

## 2022-12-07 RX ORDER — LIDOCAINE HYDROCHLORIDE 20 MG/ML
5 INJECTION, SOLUTION INFILTRATION; PERINEURAL ONCE
Status: DISCONTINUED | OUTPATIENT
Start: 2022-12-07 | End: 2022-12-07

## 2022-12-07 RX ORDER — LIDOCAINE HYDROCHLORIDE 20 MG/ML
5 INJECTION, SOLUTION INFILTRATION; PERINEURAL ONCE
Status: COMPLETED | OUTPATIENT
Start: 2022-12-07 | End: 2022-12-07

## 2022-12-07 RX ADMIN — SODIUM CHLORIDE 250 ML: 9 INJECTION, SOLUTION INTRAVENOUS at 09:44

## 2022-12-07 RX ADMIN — LIDOCAINE HYDROCHLORIDE 5 ML: 20 INJECTION, SOLUTION INFILTRATION; PERINEURAL at 09:44

## 2022-12-07 NOTE — DISCHARGE INSTRUCTIONS
Peripherally Inserted Central Catheter (PICC): Care Instructions  Your Care Instructions    Remember to carry your card for PICC. It is proof that the PICC line can be safely used for radiology procedures. A peripherally inserted central catheter (PICC) is a soft, flexible tube that runs under your skin from a vein in your arm to a large vein near your heart. One end of the catheter stays outside your body. It is a type of central venous catheter, or central venous line. You may have it for weeks or months. A PICC is used to give you medicine, blood products, nutrients, or fluids. A PICC makes doing these things more comfortable for you because they are put directly into the catheter. So you will not be stuck with a needle every time. A PICC also can be used to draw blood for tests. The end of the PICC sometimes has two or three openings so that you can get more than one type of fluid or medicine at a time. Your doctor may give you medicine to make you feel relaxed. You may feel a little pain when your doctor numbs your arm. Your doctor will then thread the catheter up a vein in your arm to a larger vein. You will not feel any pain. The doctor may use stitches or other devices to hold the catheter in place where it exits your arm. After the procedure, the site may be sore for a day or two. Follow-up care is a key part of your treatment and safety. Be sure to make and go to all appointments, and call your doctor if you are having problems. It's also a good idea to know your test results and keep a list of the medicines you take. How can you care for yourself at home? Do not lift anything heavier than 10-15lbs with affected arm. Do not perform any vigorous activity that involves affected arm; ie jumping jacks, push-ups, burpees etc.  Do not wear jewelry, such as necklaces, that can catch on the catheter. If the catheter breaks, follow the instructions your doctor gave you.  If you have no instructions, clamp or tie off the catheter. Then see a doctor as soon as possible. To help prevent infection, take a shower instead of a bath. Do not go swimming with the catheter. Try to keep the area dry. When you shower, cover the area with waterproof material, such as plastic wrap. Never touch the open end of the catheter if the cap is off. Never use scissors, knives, pins, or other sharp objects near the catheter or other tubing. If your catheter has a clamp, keep it clamped when you are not using it. Fasten or tape the catheter to your body to prevent pulling or dangling. Avoid clothing that rubs or pulls on your catheter. Avoid bending or crimping your catheter. Always wash your hands before you touch your catheter. Wear loose clothing over the catheter for the first 10 to 14 days. When getting dressed, be careful not to pull on the catheter. Dressing Changes  Your PICC dressing should be changed at least once a week. If the dressing becomes loose, wet, or dirty, it must be changed more often to prevent infection. Since the PICC is in one of your arms, you will not be able to change the dressing on your own. Your healthcare provider will have the required supplies needed to change your PICC dressing. These include medical tape, a surgical mask, sterile gloves, and your dressing kit. When should you call for help? Call 911 anytime you think you may need emergency care. For example, call if:    You passed out (lost consciousness). You have severe trouble breathing. You have sudden chest pain and shortness of breath, or you cough up blood. You have a fast or uneven pulse. Call your doctor now or seek immediate medical care if:    You have signs of infection, such as: Increased pain, swelling, warmth, or redness. Red streaks leading from the area. Pus or blood draining from the area. A fever. You have swelling in your face, chest, neck, or arm on the side where the catheter is. You have signs of a blood clot, such as bulging veins near the catheter. Your catheter is leaking, cracked, or clogged. You feel resistance when you inject medicine or fluids into your catheter. Your catheter is out of place. This may happen after severe coughing or vomiting, or if you pull on the catheter. You have chest pain or shortness of breath. Watch closely for changes in your health, and be sure to contact your doctor if:    You have any concerns about your catheter. Where can you learn more? Go to https://[email or web address].YouTube. org and sign in to your [employer] account. Enter K528 in the wireLawyer box to learn more about \"Peripherally Inserted Central Catheter (PICC): Care Instructions. \"     Current as of: September 23, 2018  Content Version: 12.0  © 5879-5136 Healthwise, Incorporated. Care instructions adapted under license by [facility] ([facility]). If you have questions about a medical condition or this instruction, always ask your healthcare professional. [place] [address] any warranty or liability for your use of this information.

## 2022-12-08 ENCOUNTER — TELEPHONE (OUTPATIENT)
Dept: INFECTIOUS DISEASES | Age: 87
End: 2022-12-08

## 2022-12-08 NOTE — TELEPHONE ENCOUNTER
Mrs Na Pastrana son calls to request a Telephone call for the F/u visit. He states his wife went out of town with the main car and the lap top. He does not have proper transportation and no way to complete a V/v at this time. F/u is tomorrow, 12/9/22. Will update ID Physician and request recommendations. 2:30pm, Per consult with Dr Betty Pedro, and review of CT Abd, continueIV Abx for 1-month andF/u in 2 weeks. Perhaps consult [facility] Surgeon. Return call to Pt's son. Advised of orders and F/u appt. He request if V/v can be done. Will update ID physician. Also, he said the [facility] Surgeon said the surgery would involve removing areas of digestive trac and placing a colostomy bag while healing and for That reason patient does Not want surgery.

## 2022-12-12 ENCOUNTER — HOSPITAL ENCOUNTER (OUTPATIENT)
Age: 87
Setting detail: SPECIMEN
Discharge: HOME | End: 2022-12-12
Payer: MEDICARE

## 2022-12-12 LAB
ALBUMIN SERPL-MCNC: 3.4 G/DL (ref 3.5–4.6)
ALP BLD-CCNC: 63 U/L (ref 40–130)
ALT SERPL-CCNC: 13 U/L (ref 0–33)
ANION GAP SERPL CALCULATED.3IONS-SCNC: 16 MEQ/L (ref 9–15)
AST SERPL-CCNC: 31 U/L (ref 0–35)
BASOPHILS ABSOLUTE: 0.1 K/UL (ref 0–0.2)
BASOPHILS RELATIVE PERCENT: 1 %
BILIRUB SERPL-MCNC: 0.5 MG/DL (ref 0.2–0.7)
BUN BLDV-MCNC: 9 MG/DL (ref 8–23)
C-REACTIVE PROTEIN: 33.3 MG/L (ref 0–5)
CALCIUM SERPL-MCNC: 9.4 MG/DL (ref 8.5–9.9)
CHLORIDE BLD-SCNC: 100 MEQ/L (ref 95–107)
CO2: 22 MEQ/L (ref 20–31)
CREAT SERPL-MCNC: 0.51 MG/DL (ref 0.5–0.9)
EOSINOPHILS ABSOLUTE: 0.2 K/UL (ref 0–0.7)
EOSINOPHILS RELATIVE PERCENT: 3.4 %
GFR SERPL CREATININE-BSD FRML MDRD: >60 ML/MIN/{1.73_M2}
GLOBULIN: 3.8 G/DL (ref 2.3–3.5)
GLUCOSE BLD-MCNC: 148 MG/DL (ref 70–99)
HCT VFR BLD CALC: 37.4 % (ref 37–47)
HEMOGLOBIN: 12.2 G/DL (ref 12–16)
LYMPHOCYTES ABSOLUTE: 1.1 K/UL (ref 1–4.8)
LYMPHOCYTES RELATIVE PERCENT: 16.3 %
MCH RBC QN AUTO: 27.1 PG (ref 27–31.3)
MCHC RBC AUTO-ENTMCNC: 32.6 % (ref 33–37)
MCV RBC AUTO: 83 FL (ref 79.4–94.8)
MONOCYTES ABSOLUTE: 0.7 K/UL (ref 0.2–0.8)
MONOCYTES RELATIVE PERCENT: 10.3 %
NEUTROPHILS ABSOLUTE: 4.8 K/UL (ref 1.4–6.5)
NEUTROPHILS RELATIVE PERCENT: 69 %
PDW BLD-RTO: 17 % (ref 11.5–14.5)
PLATELET # BLD: 207 K/UL (ref 130–400)
POTASSIUM SERPL-SCNC: 3.3 MEQ/L (ref 3.4–4.9)
RBC # BLD: 4.51 M/UL (ref 4.2–5.4)
SODIUM BLD-SCNC: 138 MEQ/L (ref 135–144)
TOTAL PROTEIN: 7.2 G/DL (ref 6.3–8)
WBC # BLD: 7 K/UL (ref 4.8–10.8)

## 2022-12-12 PROCEDURE — 85025 COMPLETE CBC W/AUTO DIFF WBC: CPT

## 2022-12-12 PROCEDURE — 86140 C-REACTIVE PROTEIN: CPT

## 2022-12-12 PROCEDURE — 85652 RBC SED RATE AUTOMATED: CPT

## 2022-12-12 PROCEDURE — 80053 COMPREHEN METABOLIC PANEL: CPT

## 2022-12-13 ENCOUNTER — OFFICE VISIT (OUTPATIENT)
Dept: CARDIOLOGY CLINIC | Age: 87
End: 2022-12-13
Payer: MEDICARE

## 2022-12-13 VITALS — HEART RATE: 84 BPM | SYSTOLIC BLOOD PRESSURE: 100 MMHG | DIASTOLIC BLOOD PRESSURE: 62 MMHG | OXYGEN SATURATION: 96 %

## 2022-12-13 DIAGNOSIS — I74.10 AORTIC THROMBUS (HCC): ICD-10-CM

## 2022-12-13 DIAGNOSIS — I50.32 CHRONIC DIASTOLIC CONGESTIVE HEART FAILURE (HCC): ICD-10-CM

## 2022-12-13 DIAGNOSIS — I71.9 DESCENDING AORTIC ANEURYSM (HCC): ICD-10-CM

## 2022-12-13 DIAGNOSIS — I71.23 ANEURYSM OF DESCENDING THORACIC AORTA WITHOUT RUPTURE: ICD-10-CM

## 2022-12-13 DIAGNOSIS — J90 PLEURAL EFFUSION: ICD-10-CM

## 2022-12-13 DIAGNOSIS — I50.32 CHRONIC DIASTOLIC CHF (CONGESTIVE HEART FAILURE) (HCC): ICD-10-CM

## 2022-12-13 DIAGNOSIS — I10 ESSENTIAL HYPERTENSION: ICD-10-CM

## 2022-12-13 DIAGNOSIS — I48.0 PAROXYSMAL ATRIAL FIBRILLATION (HCC): Primary | ICD-10-CM

## 2022-12-13 LAB — SEDIMENTATION RATE, ERYTHROCYTE: 29 MM (ref 0–30)

## 2022-12-13 PROCEDURE — 99214 OFFICE O/P EST MOD 30 MIN: CPT | Performed by: INTERNAL MEDICINE

## 2022-12-13 PROCEDURE — 1111F DSCHRG MED/CURRENT MED MERGE: CPT | Performed by: INTERNAL MEDICINE

## 2022-12-13 PROCEDURE — 1090F PRES/ABSN URINE INCON ASSESS: CPT | Performed by: INTERNAL MEDICINE

## 2022-12-13 PROCEDURE — 93000 ELECTROCARDIOGRAM COMPLETE: CPT | Performed by: INTERNAL MEDICINE

## 2022-12-13 PROCEDURE — 1124F ACP DISCUSS-NO DSCNMKR DOCD: CPT | Performed by: INTERNAL MEDICINE

## 2022-12-13 PROCEDURE — 1036F TOBACCO NON-USER: CPT | Performed by: INTERNAL MEDICINE

## 2022-12-13 PROCEDURE — G8484 FLU IMMUNIZE NO ADMIN: HCPCS | Performed by: INTERNAL MEDICINE

## 2022-12-13 PROCEDURE — G8427 DOCREV CUR MEDS BY ELIG CLIN: HCPCS | Performed by: INTERNAL MEDICINE

## 2022-12-13 PROCEDURE — G8417 CALC BMI ABV UP PARAM F/U: HCPCS | Performed by: INTERNAL MEDICINE

## 2022-12-13 RX ORDER — PIPERACILLIN SODIUM, TAZOBACTAM SODIUM 36; 4.5 G/152ML; G/152ML
INJECTION, POWDER, LYOPHILIZED, FOR SOLUTION INTRAVENOUS
COMMUNITY
Start: 2022-11-23

## 2022-12-13 NOTE — PROGRESS NOTES
Chief Complaint   Patient presents with    Follow-Up from Hospital     [facility]    Atrial Fibrillation       5-12-16: Patient presents for initial medical evaluation. Patient is followed on a regular basis by Dr. Temitope Coreas MD. S/p hospitalization for hip pain. Was seen by us for bradycardia and RBBB. She was asymptomatic at that time. Her cardizem was lowered to 240mg daily. HR is in 70's today. Pt denies chest pain, dyspnea, dyspnea on exertion, change in exercise capacity, fatigue,  nausea, vomiting, diarrhea, constipation, motor weakness, insomnia, weight loss, syncope, dizziness, lightheadedness, palpitations, PND, orthopnea, or claudication                   no hx of MI, CHF or arrhythmia. No hx of LHC. No recent stress test. Does have back and right hip pain. 6-9-16: needs to have back surgery with DR. Michelle Kay. S/p ECHO with EF of 60%, mild LVH, grade I DD, mild TR, + IAS aneurysm with ? Left to right PFO, aorta aneurysm noted meauring  4.2-4.5cm. Pt denies chest pain, dyspnea, dyspnea on exertion, change in exercise capacity, fatigue,  nausea, vomiting, diarrhea, constipation, motor weakness, insomnia, weight loss, syncope, dizziness, lightheadedness, palpitations, PND, orthopnea. BP and HR are good. 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 No dissection. Descending aorta measures 3.6cm. Pt denies chest pain, dyspnea, dyspnea on exertion, change in exercise capacity, fatigue,  nausea, vomiting, diarrhea, constipation, motor weakness, insomnia, weight loss, syncope, dizziness, lightheadedness, palpitations, PND, orthopnea, or claudication. Back surgery is tomorrow. 9-27-16: as above, doing well overall. Pt denies chest pain, dyspnea, dyspnea on exertion, change in exercise capacity, fatigue,  nausea, vomiting, diarrhea, constipation, motor weakness, insomnia, weight loss, syncope, dizziness, lightheadedness, palpitations, PND, orthopnea, or claudication.  No hospitalizations. No CHF type symptoms. No falls. 6-23-17: states she has been tired and fatigues. One time her BP was low in 70-80's. Improved with rest and eating. Was having nose bleeds. Her losartan was increased to BID. No pleural effusions. No pneumothoraces. There is no significant periaortic, pretracheal or perihilar adenopathy. Again note is made of aneurysmal dilatation of the ascending arch of the aorta. It measures 4.3 cm, unchanged. Again, note is made    aneurysmal dilatation of descending thoracic aorta. It measures approximately 3.9 cm, unchanged. Within the field-of-view of the abdomen, note is made of a small to moderate hiatal hernia. 8-12-17: s/p hospitalization for CP and SOB. S/p normal LHC. She did have an SVT episode s/p ablation with dr. Garth Marx. Her home BP readings are ok. Son states her HR is in the 40's at times. Her cardizem was lowered to one pill a day. Pt denies chest pain, , change in exercise capacity, fatigue,  nausea, vomiting, diarrhea, constipation, motor weakness, insomnia, weight loss, syncope, dizziness, lightheadedness, palpitations, PND, orthopnea. She was placed on [address] [place] [address] Road due to likely Afibb or hx of DVT/PE 5-7 yrs ago per family. S/p CTA of chest with no PE and stable aortic aneurysm measuing 4.3cm. Continues to have SOB. S/p ECHO    Summary   Severe annular calcification. Mild to moderate (2+) mitral regurgitation is present. Normal aortic valve structure and function. Normal tricuspid valve structure and function. Normal pulmonic valve structure and function. Normal left atrium. Left ventricular ejection fraction is visually estimated at 50%. Impaired relaxation compatible with diastolic dysfunction. ( reversed E/A   ratio)   Mild concentric left ventricular hypertrophy. Normal right atrium. dilated RV chamber. , RVSP of 24mHg. Normal right ventricle systolic pressure. No evidence of pericardial effusion.    No evidence of pleural effusion. 2-16-18: Pt denies chest pain, dyspnea, dyspnea on exertion, change in exercise capacity, fatigue,  nausea, vomiting, diarrhea, constipation, motor weakness, insomnia, weight loss, syncope, dizziness, lightheadedness, palpitations, PND, orthopnea, or claudication. No nitro use. BP and hr are good. CAD is stable. No LE discoloration or ulcers. No LE edema. No CHF type symptoms. Lipid profile is normal.    states her Bp is labile. No further SVT episodes. On Eliquis and no bleeding issues. Did have follow up with Pulm and refused HUNTER testing. Airway resistance and airway conductance were both normal.     OVERALL IMPRESSION:  This study shows no significant obstructive  pattern but there was improvement in airflow after bronchodilator  therapy suggesting mild reactive airway disease. There was no  restrictive or diffusion impairment noted on this study. 8-24-18: having labile BP readings a times. Pt denies chest pain, dyspnea, dyspnea on exertion, change in exercise capacity, fatigue,  nausea, vomiting, diarrhea, constipation, motor weakness, insomnia, weight loss, syncope, dizziness, lightheadedness, palpitations, PND, orthopnea, or claudication. No nitro use. BP and hr are good. CAD is stable. No LE discoloration or ulcers. No LE edema. No CHF type symptoms. Lipid profile is normal. No recent hospitalization. No change in meds. Known MR of 2+. On NOAC, no bleeding issues. 2-29-19: on eliquis. Was having nose bleeds and was referred to ENT. Back on Eliquis now. On ASA. Pt denies chest pain, dyspnea, dyspnea on exertion, change in exercise capacity, fatigue,  nausea, vomiting, diarrhea, constipation, motor weakness, insomnia, weight loss, syncope, dizziness, lightheadedness, palpitations, PND, orthopnea, or claudication. No nitro use. BP and hr are good. CAD is stable. No LE discoloration or ulcers. No LE edema. No CHF type symptoms.  Lipid profile is normal. No recent hospitalization. No change in meds. EKG with NSR. Was placed on [address] after developing DVT post ortho surgery. No documented hx of Afibb. 10/2/2020: Patient is doing well overall. She feels good. She is only left the house twice in 6 months due to coronavirus pandemic. Patient with history of a sending aorta aneurysm measuring 4.3 cm. She has a known history of mitral regurgitation 2+. She was taken off of oral anticoagulation after DVT post Ortho surgery. She does not have any documented history of atrial fibrillation. Blood pressure is 116/70 heart rate of 67 bpm.  EKG with normal sinus rhythm, right bundle branch block morphology. Patient with history of cardiac catheterization that showed normal coronary arteries. Positive history of SVT ablation. Status post CT of the chest on March 15, 2019 that showed no change of her a sending aorta aneurysm measuring  4.3 cm by 4.3 cm at the level of the bifurcation of the pulmonary arteries, descending thoracic aorta measures 4.2 x 4.9 cm at the level of the right inferior pulmonary vein. 2/22/2020: Patient is a 80 y.o. female who presents with a chief complaint of SOB. Patient is followed on a regular basis by Dr. Seth Francis MD. 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 Patient was noted to be in acute decompensated combined heart failure. She was started on IV diuretics. She was also developed paroxysmal atrial fibrillation started on oral anticoagulation.   She underwent cardiac catheterization for elevated troponins as well as chest pain and a presentation of acute decompensated heart failure and ejection fraction of 40% was noted to have moderate mid LAD stenosis with negative IFR. Patient underwent left pleural effusion thoracentesis x2 by interventional radiology. She was seen by pulmonary as well. She was transferred to rehab previously and developed worsening shortness of breath as well as O2 desaturation and transferred to [facility]. Patient was noted to have urinary retention and a Zeng was placed and urology was consulted. Patient also continues to have left pleural effusion and thoracic surgery is consulted for possible Pleurx catheter. She is currently seen in rehab and doing okay. She denies any chest discomfort. She has low blood pressure and some blood pressure medications are on hold        3/25/2021: Status post hospitalization at [facility] for acute decompensated heart failure/bilateral pleural effusions. Noted to be in paroxysmal atrial fibrillation and started on oral anticoagulation. Patient had a long hospitalization as well as multiple thoracentesis. She required rehab stay. Patient was noted to have urinary retention at that time. She underwent cardiac catheterization with moderate mid LAD stenosis status post negative IFR, ejection fraction of 40%. Patient is on Xarelto. She is on Coreg as well as digoxin. Renal function is normal.  Hemoglobin is stable. 10-1-21: doing ok. On home O2. Hx of CHF. Patient with history of congestive heart failure and pleural effusions with history of thoracentesis. History of paroxysmal atrial fibrillation and she is on oral anticoagulation  She underwent cardiac catheterization with moderate mid LAD stenosis status post negative IFR, ejection fraction of 40%. Patient is on Xarelto. She is on Coreg as well as digoxin. Blood pressure and heart rate are within normal limits today. EKG with normal sinus rhythm nonspecific ST changes    4-1-22: History of CHF, pleural effusions status post thoracentesis.   Patient also with history of paroxysmal atrial fibrillation treatment remains on oral anticoagulation. he underwent cardiac catheterization with moderate mid LAD stenosis status post negative IFR, ejection fraction of 40%. Patient is on Xarelto. Patient with history of thoracic aortic aneurysm measuring 4.4 x 4.5 cm. The descending thoracic aorta measures 3.8 cm. Pt denies chest pain, dyspnea, dyspnea on exertion, change in exercise capacity, fatigue,  nausea, vomiting, diarrhea, constipation, motor weakness, insomnia, weight loss, syncope, dizziness, lightheadedness, palpitations, PND, orthopnea, or claudication. EKG with normal sinus rhythm nonspecific ST changes normal QTC    10-7-22: s/p hospitalization and rehab for abscess in her colon. Hx of afib, on DOAC now. Ascending AO aneurysm-  had increased in size and measures 5.3cm distal arch. She now has new large Mural thrombus distal arch to the descending AO since CT of 7/8/22 despite being on Xarelto due to coumadin difficult to manage. Hx of mod CAD. Normal LVF. cardiac catheterization with moderate mid LAD stenosis status post negative IFR    12-13-22: Patient status post hospitalization as well as rehab stay for acute on chronic deep decompensated diastolic congestive heart failure. She responded well to Lasix IV and continued on p.o.  Echocardiogram performed showing ejection fraction of 40% which was prior 60%. Which was thought to be related to atrial fibrillation. She was placed on Entresto and beta-blocker. She remains on oral anticoagulation. As well as digoxin was added. Patient with history of paroxysmal atrial fibrillation and was in normal sinus rhythm at discharge. She has history of moderate CAD. Previous cardiac catheterization with moderate mid LAD stenosis status post negative IFR. She did undergo CT scan of the chest that was negative for pulmonary embolism. She does have an ascending aorta aneurysm measuring 5.3 cm. EKG today with normal sinus rhythm PACs.   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    Headache    Depression    Thoracic aortic aneurysm without rupture    Descending aortic aneurysm (HCC)    Vertigo    Shortness of breath    Essential hypertension    Acute on chronic combined systolic and diastolic CHF (congestive heart failure) (HCC)    Gait abnormality    A-fib (HCC)    Pleural effusion    Hypoxia    Acute pulmonary edema (HCC)    Acute on chronic combined systolic (congestive) and diastolic (congestive) heart failure (HCC)    Acute cystitis with hematuria    Chronic diastolic CHF (congestive heart failure) (HCC)    Right lower quadrant abdominal pain    Hyponatremia    Hypokalemia    Anemia    CHF (congestive heart failure) (HCC)    Hypothyroid    Abdominal mass    Central retinal vein occlusion, left eye, stable    Hx of drainage of abscess    Abdominal pain    Streptococcus viridans infection    Aortic thrombus (HCC)    Intra-abdominal abscess (HCC)    Adnexal mass    Change or removal of drains    Difficulty in walking    Muscle weakness (generalized)    Acute diastolic HF (heart failure) (HCC)    CHF (congestive heart failure), NYHA class I, acute on chronic, combined ([address]    Gastro-esophageal reflux disease without esophagitis    Intracardiac thrombosis, not elsewhere classified    Impaired mobility and ADLs    Palliative care encounter    Advanced care planning/counseling discussion    Goals of care, counseling/discussion       Past Surgical History:   Procedure Laterality Date    IR INS PICC VAD W SQ PORT GREATER THAN 5  9/23/2022    IR INS PICC VAD W SQ PORT GREATER THAN 5 9/23/2022 [facility] SPECIAL PROCEDURE    JOINT REPLACEMENT Bilateral knees    OTHER SURGICAL HISTORY Right 07/21/2022    cat scan guided abdominal mass aspiration with drain placement by Dr. Doug Licona Left 02/25/2021    LEFT PLEURAL CATHETER INSERTION performed by John Madden MD at [place] [id number] Left 01/29/2021    total of 755 cc removed per Dr Amanda Burrows specimen sent to lab       Social History     Socioeconomic History    Marital status:      Number of children: 2   Tobacco Use    Smoking status: Never    Smokeless tobacco: Never   Vaping Use    Vaping Use: Never used   Substance and Sexual Activity    Alcohol use: No     Alcohol/week: 0.0 standard drinks    Drug use: No    Sexual activity: Not Currently   Social History Narrative    , Lives With: Alone, son lives down the street, dtr is in the area    Type of Home: [address]: One level    Home Access: Stairs to enter with rails- Number of Steps: 2- Rails: Both    Bathroom Shower/Tub: Tub/Shower unit, Bathroom Equipment: Grab bars in shower, Shower chair    Home Equipment: Rolling walker, Cane(Pt infrequemtly uses DME for ambulation and prefers to furniture walk in home)    ADL Assistance: Independent, [address]: [place] [address] Responsibilities: Yes    Ambulation Assistance: Independent, Transfer Assistance: Independent    Additional Comments: Son stops over 2 times daily           Family History   Problem Relation Age of Onset    Arthritis Mother     Arthritis Father     High Blood Pressure Father        Current Outpatient Medications   Medication Sig Dispense Refill    piperacillin-tazobactam (ZOSYN) 40.5 (36-4.5) g injection       albuterol sulfate HFA (PROVENTIL;VENTOLIN;PROAIR) 108 (90 Base) MCG/ACT inhaler Inhale 2 puffs into the lungs every 6 hours as needed for Wheezing 18 g 3    digoxin (LANOXIN) 125 MCG tablet Take 1 tablet by mouth daily 30 tablet 3    sacubitril-valsartan (ENTRESTO) 24-26 MG per tablet Take 1 tablet by mouth 2 times daily 60 tablet 1    sucralfate (CARAFATE) 1 GM tablet Take 1 tablet by mouth 4 times daily 120 tablet 3    Lactobacillus TABS Take by mouth daily      rivaroxaban (XARELTO) 15 MG TABS tablet Take 1 tablet by mouth daily 90 tablet 3    meclizine (ANTIVERT) 25 MG tablet Take by mouth daily      Vitamin D (CHOLECALCIFEROL) 50 MCG (2000 UT) TABS tablet Take 1 tablet by mouth Daily with supper (Patient taking differently: Take 2,000 Units by mouth Daily with supper Indications: Nutritional Support) 60 tablet 5    potassium chloride (KLOR-CON M) 20 MEQ extended release tablet Take 20 mEq by mouth 2 times daily Indications: Nutritional Support      furosemide (LASIX) 20 MG tablet TAKE 1 TABLET DAILY (Patient taking differently: Take 20 mg by mouth daily Indications: Congestive Heart Failure) 90 tablet 3    pantoprazole (PROTONIX) 40 MG tablet TAKE 1 TABLET DAILY (Patient taking differently: Take 40 mg by mouth daily Indications: Ulcer) 90 tablet 3    carvedilol (COREG) 6.25 MG tablet TAKE 1 TABLET TWICE A DAY (Patient taking differently: Take 6.25 mg by mouth 2 times daily Indications: High Blood Pressure Disorder) 180 tablet 3    nitroGLYCERIN (NITROSTAT) 0.4 MG SL tablet up to max of 3 total doses. If no relief after 1 dose, call 911. (Patient taking differently: Place 0.4 mg under the tongue every 5 minutes as needed Indications: Chest Pain up to max of 3 total doses.  If no relief after 1 dose, call 911.) 25 tablet 3    ferrous sulfate (IRON 325) 325 (65 Fe) MG tablet Take 1 tablet by mouth 2 times daily (with meals) (Patient taking differently: Take 325 mg by mouth 2 times daily (with meals) Indications: Anemia) 30 tablet 3    Carboxymethylcellulose Sodium (EYE DROPS OP) Apply 1 drop to eye as needed (Dry eyes) Indications: Systane       Boswellia-Glucosamine-Vit D (OSTEO BI-FLEX ONE PER DAY) TABS Take 1 tablet by mouth daily Indications: Nutritional Support      Multiple Vitamins-Minerals (CENTRUM SILVER ULTRA WOMENS) TABS Take 1 tablet by mouth daily Indications: Nutritional Support      vitamin B-12 (CYANOCOBALAMIN) 1000 MCG tablet Take 1,000 mcg by mouth daily Indications: Nutritional Support      citalopram (CELEXA) 20 MG tablet Take 20 mg by mouth daily Indications: Depression      SYNTHROID 88 MCG tablet Take 88 mcg by mouth daily Indications: Underactive Thyroid       No current facility-administered medications for this visit. Latex and Tape [adhesive tape]    Review of Systems:  General ROS: negative  Psychological ROS: negative  Hematological and Lymphatic ROS: No history of blood clots or bleeding disorder. Respiratory ROS: no cough, shortness of breath, or wheezing  Cardiovascular ROS: no chest pain or dyspnea on exertion  Gastrointestinal ROS: no abdominal pain, change in bowel habits, or black or bloody stools  Genito-Urinary ROS: no dysuria, trouble voiding, or hematuria  Musculoskeletal ROS: negative  Neurological ROS: no TIA or stroke symptoms  Dermatological ROS: negative    VITALS:  Blood pressure 100/62, pulse 84, SpO2 96 %. There is no height or weight on file to calculate BMI. Physical Examination:  General appearance - alert, well appearing, and in no distress and overweight  Mental status - alert, oriented to person, place, and time  Neck - Neck is supple, no JVD or carotid bruits. No thyromegaly or adenopathy.    Chest - clear to auscultation, no wheezes, rales or rhonchi, symmetric air entry  Heart - normal rate, regular rhythm, normal S1, S2, no murmurs, rubs, clicks or gallops  Abdomen - soft, nontender, nondistended, no masses or organomegaly  Neurological - alert, oriented, normal speech, no focal findings or movement disorder noted  Extremities - peripheral pulses normal, 1-2+ pedal edema, no clubbing or cyanosis  Skin - normal coloration and turgor, no rashes, no suspicious skin lesions noted        Orders Placed This Encounter   Procedures    EKG 12 lead ASSESSMENT:     Diagnosis Orders   1. Paroxysmal atrial fibrillation (HCC)  EKG 12 lead      2. Aortic thrombus (HCC)        3. Chronic diastolic congestive heart failure ([address]        4. Chronic diastolic CHF (congestive heart failure) ([address]        5. Descending aortic aneurysm ([address]        6. Essential hypertension        7. Aneurysm of descending thoracic aorta without rupture        8. Pleural effusion        7. Nonobstructive CAD      PLAN:     Patient will need to continue to follow up with you for their general medical care     As always, aggressive risk factor modification is strongly recommended. We should adhere to the [address] VII guidelines for HTN management and the NCEP ATP III guidelines for LDL-C management. Cardiac diet is always recommended with low fat, cholesterol, calories and sodium. Continue medications at current doses. Medical therapy for aorta aneurysm, not surgical candidate. Also has infection now. Cont with DOAC given parox afib and mural thrombus in aorta    Follow up with ID for colon abscess. Increase potassium to 20 mEq twice daily    Check EKG    Patient was advised and encouraged to check blood pressure at home or at a pharmacy, maintain a logbook, and also call us back if blood pressure are above the target ranges or if it is low. Patient clearly understands and agrees to the instructions. We will need to continue to monitor muscle and liver enzymes, BUN, CR, and electrolytes. Details of medical condition explained and patient was warned about adverse consequences of uncontrolled medical conditions and possible side effects of prescribed medications.

## 2022-12-19 ENCOUNTER — HOSPITAL ENCOUNTER (OUTPATIENT)
Age: 87
Setting detail: SPECIMEN
Discharge: HOME | End: 2022-12-19
Payer: MEDICARE

## 2022-12-19 LAB
ALBUMIN SERPL-MCNC: 3.1 G/DL (ref 3.5–4.6)
ALP BLD-CCNC: 59 U/L (ref 40–130)
ALT SERPL-CCNC: 12 U/L (ref 0–33)
ANION GAP SERPL CALCULATED.3IONS-SCNC: 15 MEQ/L (ref 9–15)
AST SERPL-CCNC: 31 U/L (ref 0–35)
BASOPHILS ABSOLUTE: 0.1 K/UL (ref 0–0.2)
BASOPHILS RELATIVE PERCENT: 1.2 %
BILIRUB SERPL-MCNC: 0.4 MG/DL (ref 0.2–0.7)
BUN BLDV-MCNC: 9 MG/DL (ref 8–23)
C-REACTIVE PROTEIN: 35.7 MG/L (ref 0–5)
CALCIUM SERPL-MCNC: 9.1 MG/DL (ref 8.5–9.9)
CHLORIDE BLD-SCNC: 101 MEQ/L (ref 95–107)
CO2: 22 MEQ/L (ref 20–31)
CREAT SERPL-MCNC: 0.48 MG/DL (ref 0.5–0.9)
EOSINOPHILS ABSOLUTE: 0.2 K/UL (ref 0–0.7)
EOSINOPHILS RELATIVE PERCENT: 3.2 %
GFR SERPL CREATININE-BSD FRML MDRD: >60 ML/MIN/{1.73_M2}
GLOBULIN: 3.5 G/DL (ref 2.3–3.5)
GLUCOSE BLD-MCNC: 119 MG/DL (ref 70–99)
HCT VFR BLD CALC: 35.3 % (ref 37–47)
HEMOGLOBIN: 11.7 G/DL (ref 12–16)
LYMPHOCYTES ABSOLUTE: 0.9 K/UL (ref 1–4.8)
LYMPHOCYTES RELATIVE PERCENT: 12.4 %
MCH RBC QN AUTO: 27.6 PG (ref 27–31.3)
MCHC RBC AUTO-ENTMCNC: 33 % (ref 33–37)
MCV RBC AUTO: 83.8 FL (ref 79.4–94.8)
MONOCYTES ABSOLUTE: 0.8 K/UL (ref 0.2–0.8)
MONOCYTES RELATIVE PERCENT: 11.2 %
NEUTROPHILS ABSOLUTE: 5.4 K/UL (ref 1.4–6.5)
NEUTROPHILS RELATIVE PERCENT: 72 %
PDW BLD-RTO: 17.2 % (ref 11.5–14.5)
PLATELET # BLD: 211 K/UL (ref 130–400)
POTASSIUM SERPL-SCNC: 3.8 MEQ/L (ref 3.4–4.9)
RBC # BLD: 4.22 M/UL (ref 4.2–5.4)
SEDIMENTATION RATE, ERYTHROCYTE: 33 MM (ref 0–30)
SODIUM BLD-SCNC: 138 MEQ/L (ref 135–144)
TOTAL PROTEIN: 6.6 G/DL (ref 6.3–8)
WBC # BLD: 7.5 K/UL (ref 4.8–10.8)

## 2022-12-19 PROCEDURE — 80053 COMPREHEN METABOLIC PANEL: CPT

## 2022-12-19 PROCEDURE — 85025 COMPLETE CBC W/AUTO DIFF WBC: CPT

## 2022-12-19 PROCEDURE — 86140 C-REACTIVE PROTEIN: CPT

## 2022-12-19 PROCEDURE — 85652 RBC SED RATE AUTOMATED: CPT

## 2022-12-22 ENCOUNTER — OFFICE VISIT (OUTPATIENT)
Dept: INFECTIOUS DISEASES | Age: 87
End: 2022-12-22

## 2022-12-22 VITALS — HEART RATE: 77 BPM | SYSTOLIC BLOOD PRESSURE: 114 MMHG | DIASTOLIC BLOOD PRESSURE: 75 MMHG | TEMPERATURE: 96.8 F

## 2022-12-22 DIAGNOSIS — I50.9 CONGESTIVE HEART FAILURE, NYHA CLASS 1, UNSPECIFIED CONGESTIVE HEART FAILURE TYPE (HCC): ICD-10-CM

## 2022-12-22 DIAGNOSIS — K63.89 COLONIC MASS: ICD-10-CM

## 2022-12-22 DIAGNOSIS — N94.89 ADNEXAL MASS: ICD-10-CM

## 2022-12-22 DIAGNOSIS — K65.1 INTRA-ABDOMINAL ABSCESS (HCC): Primary | ICD-10-CM

## 2022-12-22 NOTE — PROGRESS NOTES
Infectious Disease Progress Note       Patient is a followup regarding  Has been on prolonged abx. All ct and clinical notes reviewed. Discussed with son. CT with possible mass/malignancy  Needs  biopsy  Because of the holiday, will continue abx through Dec 27. Then stop. If no worse by December 29, pull picc  Needs GI referral for colonoscopy. Called and spoke to [facility] regarding case - we need a small biopst - path, gram stain, cutlure, AFB, fungal studies. Follow up mid January for plan regarding all results. Lab Results   Component Value Date    WBC 7.8 12/27/2022    HGB 11.7 (L) 12/27/2022    HCT 36.8 (L) 12/27/2022    MCV 84.6 12/27/2022     12/27/2022     Lab Results   Component Value Date/Time     12/27/2022 01:18 PM    K 4.0 12/27/2022 01:18 PM    K 4.1 11/20/2022 03:33 PM     12/27/2022 01:18 PM    CO2 24 12/27/2022 01:18 PM    BUN 9 12/27/2022 01:18 PM    CREATININE 0.53 12/27/2022 01:18 PM    GLUCOSE 131 12/27/2022 01:18 PM    CALCIUM 9.1 12/27/2022 01:18 PM        WBC trends are being monitored. Antibiotic doses are being adjusted per most recent renal labs.      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    Headache    Depression    Thoracic aortic aneurysm without rupture    Descending aortic aneurysm (HCC)    Vertigo    Shortness of breath    Essential hypertension    Acute on chronic combined systolic and diastolic CHF (congestive heart failure) (HCC)    Gait abnormality    A-fib (HCC)    Pleural effusion    Hypoxia    Acute pulmonary edema (HCC)    Acute on chronic combined systolic (congestive) and diastolic (congestive) heart failure (HCC)    Acute cystitis with hematuria    Chronic diastolic CHF (congestive heart failure) (HCC)    Right lower quadrant abdominal pain    Hyponatremia    Hypokalemia    Anemia    CHF (congestive heart failure) (HCC)    Hypothyroid    Abdominal mass    Central retinal vein occlusion, left eye, stable    Hx of drainage of abscess    Abdominal pain    Streptococcus viridans infection    Aortic thrombus (HCC)    Intra-abdominal abscess (HCC)    Adnexal mass    Change or removal of drains    Difficulty in walking    Muscle weakness (generalized)    Acute diastolic HF (heart failure) (HCC)    CHF (congestive heart failure), NYHA class I, acute on chronic, combined ([facility] [address]    Gastro-esophageal reflux disease without esophagitis    Intracardiac thrombosis, not elsewhere classified    Impaired mobility and ADLs    Palliative care encounter    Advanced care planning/counseling discussion    Goals of care, counseling/discussion       Discussed with her son accompanying her. Patient is awake and alert, NAD  Neck supple  Chest equal expansion  Abdomen ND  Extrem no new changes  Expressions symmetrical  No obvious rashes. ASSESSMENT:  Colon abscess vs mass. There is an area of concern L adnexa and R flank      PLAN:    Extend abx through Monday then stop abx. This will get her through holiday weekend in case of issue. Call IR - needs biopsy. Interventional radiologist out until January. Will need to schedule biopsy if possible. Other option is to consult GI and do biopsy via colonoscopy  CRP 35   Last CT reviewed and does not look like abscess. Looks more like a mass/ concern for malignancy. Patient has been on abx for very long time. Will need to re-evaluate.        Imaging and labs were reviewed per medical records and any ID pertinent labs were also addressed  Time spent today in combination of reviewing patient's chart, medications, labs, pictures when pertinent, provider communication as necessary, counseling patient, care/coordination not otherwise reported here, and patient face to face virtual visit 30 min.   >50% of that time spent counseling and coordination of patient care  Addressed preventive measures such as vaccinations, the importance of annual exam with the PCP, and the importance of health maintenance by dietary and exercise regimens. All questions were answered from an ID perspective and to the best of my ability. Social distancing measures, the importance of face masks that properly cover the nose and mouth in public, and proper hand hygiene will continue to be addressed    Risks and benefits of ID prescribed medications were discussed with patient or supporting staff caring for the patient as appropriate and feedback obtained.      Avis Casas, DO

## 2022-12-27 ENCOUNTER — HOSPITAL ENCOUNTER (OUTPATIENT)
Age: 87
Setting detail: SPECIMEN
Discharge: HOME | End: 2022-12-27
Payer: MEDICARE

## 2022-12-27 LAB
ALBUMIN SERPL-MCNC: 3.2 G/DL (ref 3.5–4.6)
ALP BLD-CCNC: 58 U/L (ref 40–130)
ALT SERPL-CCNC: 15 U/L (ref 0–33)
ANION GAP SERPL CALCULATED.3IONS-SCNC: 12 MEQ/L (ref 9–15)
AST SERPL-CCNC: 35 U/L (ref 0–35)
BASOPHILS ABSOLUTE: 0.1 K/UL (ref 0–0.2)
BASOPHILS RELATIVE PERCENT: 0.8 %
BILIRUB SERPL-MCNC: 0.5 MG/DL (ref 0.2–0.7)
BUN BLDV-MCNC: 9 MG/DL (ref 8–23)
C-REACTIVE PROTEIN: 40.5 MG/L (ref 0–5)
CALCIUM SERPL-MCNC: 9.1 MG/DL (ref 8.5–9.9)
CHLORIDE BLD-SCNC: 100 MEQ/L (ref 95–107)
CO2: 24 MEQ/L (ref 20–31)
CREAT SERPL-MCNC: 0.53 MG/DL (ref 0.5–0.9)
EOSINOPHILS ABSOLUTE: 0.2 K/UL (ref 0–0.7)
EOSINOPHILS RELATIVE PERCENT: 2.5 %
GFR SERPL CREATININE-BSD FRML MDRD: >60 ML/MIN/{1.73_M2}
GLOBULIN: 3.6 G/DL (ref 2.3–3.5)
GLUCOSE BLD-MCNC: 131 MG/DL (ref 70–99)
HCT VFR BLD CALC: 36.8 % (ref 37–47)
HEMOGLOBIN: 11.7 G/DL (ref 12–16)
LYMPHOCYTES ABSOLUTE: 1 K/UL (ref 1–4.8)
LYMPHOCYTES RELATIVE PERCENT: 13 %
MCH RBC QN AUTO: 26.8 PG (ref 27–31.3)
MCHC RBC AUTO-ENTMCNC: 31.7 % (ref 33–37)
MCV RBC AUTO: 84.6 FL (ref 79.4–94.8)
MONOCYTES ABSOLUTE: 0.8 K/UL (ref 0.2–0.8)
MONOCYTES RELATIVE PERCENT: 10.5 %
NEUTROPHILS ABSOLUTE: 5.7 K/UL (ref 1.4–6.5)
NEUTROPHILS RELATIVE PERCENT: 73.2 %
PDW BLD-RTO: 16.7 % (ref 11.5–14.5)
PLATELET # BLD: 205 K/UL (ref 130–400)
POTASSIUM SERPL-SCNC: 4 MEQ/L (ref 3.4–4.9)
RBC # BLD: 4.34 M/UL (ref 4.2–5.4)
SEDIMENTATION RATE, ERYTHROCYTE: 34 MM (ref 0–30)
SODIUM BLD-SCNC: 136 MEQ/L (ref 135–144)
TOTAL PROTEIN: 6.8 G/DL (ref 6.3–8)
WBC # BLD: 7.8 K/UL (ref 4.8–10.8)

## 2022-12-27 PROCEDURE — 85652 RBC SED RATE AUTOMATED: CPT

## 2022-12-27 PROCEDURE — 85025 COMPLETE CBC W/AUTO DIFF WBC: CPT

## 2022-12-27 PROCEDURE — 86140 C-REACTIVE PROTEIN: CPT

## 2022-12-27 PROCEDURE — 80053 COMPREHEN METABOLIC PANEL: CPT

## 2022-12-29 ENCOUNTER — OFFICE VISIT (OUTPATIENT)
Dept: GASTROENTEROLOGY | Age: 87
End: 2022-12-29
Payer: MEDICARE

## 2022-12-29 VITALS — HEART RATE: 68 BPM | OXYGEN SATURATION: 99 % | WEIGHT: 150 LBS | HEIGHT: 59 IN | BODY MASS INDEX: 30.24 KG/M2

## 2022-12-29 DIAGNOSIS — R19.01 RIGHT UPPER QUADRANT ABDOMINAL MASS: Primary | ICD-10-CM

## 2022-12-29 DIAGNOSIS — R19.01 RIGHT UPPER QUADRANT ABDOMINAL MASS: ICD-10-CM

## 2022-12-29 PROCEDURE — G8427 DOCREV CUR MEDS BY ELIG CLIN: HCPCS | Performed by: SPECIALIST

## 2022-12-29 PROCEDURE — 1036F TOBACCO NON-USER: CPT | Performed by: SPECIALIST

## 2022-12-29 PROCEDURE — G8417 CALC BMI ABV UP PARAM F/U: HCPCS | Performed by: SPECIALIST

## 2022-12-29 PROCEDURE — 1124F ACP DISCUSS-NO DSCNMKR DOCD: CPT | Performed by: SPECIALIST

## 2022-12-29 PROCEDURE — G8484 FLU IMMUNIZE NO ADMIN: HCPCS | Performed by: SPECIALIST

## 2022-12-29 PROCEDURE — 1090F PRES/ABSN URINE INCON ASSESS: CPT | Performed by: SPECIALIST

## 2022-12-29 PROCEDURE — 99203 OFFICE O/P NEW LOW 30 MIN: CPT | Performed by: SPECIALIST

## 2022-12-29 RX ORDER — SODIUM, POTASSIUM,MAG SULFATES 17.5-3.13G
SOLUTION, RECONSTITUTED, ORAL ORAL
Qty: 354 ML | Refills: 0 | Status: SHIPPED | OUTPATIENT
Start: 2022-12-29

## 2022-12-29 ASSESSMENT — ENCOUNTER SYMPTOMS
ABDOMINAL PAIN: 0
NAUSEA: 0
RECTAL PAIN: 0
CONSTIPATION: 0
ANAL BLEEDING: 0
VOMITING: 0
DIARRHEA: 1
ABDOMINAL DISTENTION: 0
EYES NEGATIVE: 1
BLOOD IN STOOL: 0
RESPIRATORY NEGATIVE: 1

## 2022-12-29 NOTE — PROGRESS NOTES
Gastroenterology Clinic Visit    Shantelle Malone  [id number]  Chief Complaint   Patient presents with    New Patient     Patient previously had abscess on/near colon, was on abx, CT scan is showing some abnormality on left side        HPI: 80 y.o. female presents to the clinic with history of intra-abdominal abscess. Patient is referred to GI service to evaluate for colonoscopy.,  In July 2022 patient was admitted with abdominal pain and work-up showed a intra-abdominal abscess in the right side which was drained percutaneously. Patient has been on IV antibiotics and most recent CT abdomen pelvis showed persistent mass with mixed attenuation in the right flank and also lower quadrant adjacent to the splenic flexure concerning malignancy with necrosis. Patient was seen by colorectal surgery here and concerning her advanced age and other comorbid conditions surgery was deferred and patient was seen at [facility] and patient was given the option of surgery but she declined, no sharp abdominal pain no nausea no vomiting, had diarrhea which she attributes to antibiotics discontinued 2 days ago, history of rectal bleeding, tolerating oral feedings well. No fever or chills, and speaks [place] and history was obtained with the help of her son,    Review of Systems   Constitutional: Negative. HENT: Negative. Eyes: Negative. Respiratory: Negative. Cardiovascular: Negative. History of congestive heart failure, atrial fibrillation. ,  Aneurysm of ascending and descending thoracic aorta. Gastrointestinal:  Positive for diarrhea. Negative for abdominal distention, abdominal pain, anal bleeding, blood in stool, constipation, nausea, rectal pain and vomiting. Endocrine: Negative. Genitourinary: Negative. Musculoskeletal: Negative. Skin: Negative. Allergic/Immunologic: Negative for food allergies. Neurological:  Positive for dizziness and headaches.         Of Charcot-Arleen-Tooth muscular atrophy. Hematological: Negative. Psychiatric/Behavioral: Negative.         Past Medical History:   Diagnosis Date    Ascending aortic aneurysm 6/23/2017    Charcot Arleen Tooth muscular atrophy     CHF (congestive heart failure) ([facility])     Chronic diastolic CHF (congestive heart failure) ([facility] [address] 4/1/2022    Depression     Descending aortic aneurysm ([facility] [address] 6/23/2017    Essential hypertension 2/16/2018    Headache     HTN (hypertension)     Impaired mobility and activities of daily living     Lumbar stenosis with neurogenic claudication     Myelopathy ([facility] [address]     Osteoarthritis       Past Surgical History:   Procedure Laterality Date    IR INS PICC VAD W SQ PORT GREATER THAN 5  9/23/2022    IR INS PICC VAD W SQ PORT GREATER THAN 5 9/23/2022 [facility] SPECIAL PROCEDURE    JOINT REPLACEMENT Bilateral     knees    OTHER SURGICAL HISTORY Right 07/21/2022    cat scan guided abdominal mass aspiration with drain placement by Dr. Brian Jorgensen Left 02/25/2021    LEFT PLEURAL CATHETER INSERTION performed by Yoly Rosales MD at [place] [id number] Left 01/29/2021    total of 755 cc removed per Dr Dylan Charlse specimen sent to lab     Current Outpatient Medications on File Prior to Visit   Medication Sig Dispense Refill    piperacillin-tazobactam (ZOSYN) 40.5 (36-4.5) g injection       albuterol sulfate HFA (PROVENTIL;VENTOLIN;PROAIR) 108 (90 Base) MCG/ACT inhaler Inhale 2 puffs into the lungs every 6 hours as needed for Wheezing 18 g 3    digoxin (LANOXIN) 125 MCG tablet Take 1 tablet by mouth daily 30 tablet 3    sacubitril-valsartan (ENTRESTO) 24-26 MG per tablet Take 1 tablet by mouth 2 times daily 60 tablet 1    sucralfate (CARAFATE) 1 GM tablet Take 1 tablet by mouth 4 times daily 120 tablet 3    Lactobacillus TABS Take by mouth daily      rivaroxaban (XARELTO) 15 MG TABS tablet Take 1 tablet by mouth daily 90 tablet 3    meclizine (ANTIVERT) 25 MG tablet Take by mouth daily      Vitamin D (CHOLECALCIFEROL) 50 MCG (2000 UT) TABS tablet Take 1 tablet by mouth Daily with supper (Patient taking differently: Take 2,000 Units by mouth Daily with supper Indications: Nutritional Support) 60 tablet 5    potassium chloride (KLOR-CON M) 20 MEQ extended release tablet Take 20 mEq by mouth 2 times daily Indications: Nutritional Support      furosemide (LASIX) 20 MG tablet TAKE 1 TABLET DAILY (Patient taking differently: Take 20 mg by mouth daily Indications: Congestive Heart Failure) 90 tablet 3    pantoprazole (PROTONIX) 40 MG tablet TAKE 1 TABLET DAILY (Patient taking differently: Take 40 mg by mouth daily Indications: Ulcer) 90 tablet 3    carvedilol (COREG) 6.25 MG tablet TAKE 1 TABLET TWICE A DAY (Patient taking differently: Take 6.25 mg by mouth 2 times daily Indications: High Blood Pressure Disorder) 180 tablet 3    nitroGLYCERIN (NITROSTAT) 0.4 MG SL tablet up to max of 3 total doses. If no relief after 1 dose, call 911. (Patient taking differently: Place 0.4 mg under the tongue every 5 minutes as needed Indications: Chest Pain up to max of 3 total doses.  If no relief after 1 dose, call 911.) 25 tablet 3    ferrous sulfate (IRON 325) 325 (65 Fe) MG tablet Take 1 tablet by mouth 2 times daily (with meals) (Patient taking differently: Take 325 mg by mouth 2 times daily (with meals) Indications: Anemia) 30 tablet 3    Carboxymethylcellulose Sodium (EYE DROPS OP) Apply 1 drop to eye as needed (Dry eyes) Indications: Systane       Boswellia-Glucosamine-Vit D (OSTEO BI-FLEX ONE PER DAY) TABS Take 1 tablet by mouth daily Indications: Nutritional Support      Multiple Vitamins-Minerals (CENTRUM SILVER ULTRA WOMENS) TABS Take 1 tablet by mouth daily Indications: Nutritional Support      vitamin B-12 (CYANOCOBALAMIN) 1000 MCG tablet Take 1,000 mcg by mouth daily Indications: Nutritional Support      citalopram (CELEXA) 20 MG tablet Take 20 mg by mouth daily Indications: Depression      SYNTHROID 88 MCG tablet Take 88 mcg by mouth daily Indications: Underactive Thyroid      [DISCONTINUED] amLODIPine (NORVASC) 5 MG tablet Take 1 tablet by mouth daily 90 tablet 3     No current facility-administered medications on file prior to visit. Family History   Problem Relation Age of Onset    Arthritis Mother     Arthritis Father     High Blood Pressure Father     Colon Cancer Brother       Social History     Socioeconomic History    Marital status:     Number of children: 2   Tobacco Use    Smoking status: Never    Smokeless tobacco: Never   Vaping Use    Vaping Use: Never used   Substance and Sexual Activity    Alcohol use: No     Alcohol/week: 0.0 standard drinks    Drug use: No    Sexual activity: Not Currently   Social History Narrative    , Lives With: Alone, son lives down the street, dtr is in the area    [facility]  [place] [address]: One level    Home Access: Stairs to enter with rails- Number of Steps: 2- Rails: Both    Bathroom Shower/Tub: Tub/Shower unit, Bathroom Equipment: Grab bars in shower, Shower chair    Home Equipment: Rolling walker, Cane(Pt infrequemtly uses DME for ambulation and prefers to furniture walk in home)    ADL Assistance: [address] [place] [address] Avenue: Independent    Homemaking Responsibilities: Yes    Ambulation Assistance: Independent, Transfer Assistance: Independent    Additional Comments: Son stops over 2 times daily           Pulse 68, height 4' 11\" (1.499 m), weight 150 lb (68 kg), SpO2 99 %. Physical Exam  Constitutional:       Appearance: She is well-developed. HENT:      Head: Normocephalic and atraumatic. Eyes:      Conjunctiva/sclera: Conjunctivae normal.      Pupils: Pupils are equal, round, and reactive to light. Cardiovascular:      Rate and Rhythm: Normal rate. Comments: S1-S2 with occasional irregular rhythm.   Pulmonary:      Effort: Pulmonary effort is normal.   Abdominal:      General: Bowel sounds are normal. Palpations: Abdomen is soft. Comments: Soft nontender palpable mass in the right upper quadrant area. Bowel sounds are present.,  No peritoneal signs   Musculoskeletal:         General: Normal range of motion. Cervical back: Normal range of motion. Comments: No pedal edema   Skin:     General: Skin is warm. Neurological:      Mental Status: She is alert. Laboratory, Pathology, Radiology reviewed indetail with relevant important investigations summarized below:  Lab Results   Component Value Date    WBC 7.8 12/27/2022    HGB 11.7 (L) 12/27/2022    HCT 36.8 (L) 12/27/2022    MCV 84.6 12/27/2022     12/27/2022     Lab Results   Component Value Date    ALT 15 12/27/2022    AST 35 12/27/2022    ALKPHOS 58 12/27/2022    BILITOT 0.5 12/27/2022       CT ABDOMEN PELVIS W IV CONTRAST Additional Contrast? Radiologist Recommendation    Result Date: 12/6/2022  EXAMINATION: CT OF THE ABDOMEN AND PELVIS WITH CONTRAST 12/6/2022 3:42 pm TECHNIQUE: CT of the abdomen and pelvis was performed with the administration of intravenous contrast. Multiplanar reformatted images are provided for review. Automated exposure control, iterative reconstruction, and/or weight based adjustment of the mA/kV was utilized to reduce the radiation dose to as low as reasonably achievable. COMPARISON: 10/19/2022 HISTORY: ORDERING SYSTEM PROVIDED HISTORY: Intra-abdominal abscess [facility]) TECHNOLOGIST PROVIDED HISTORY: Additional Contrast?->Radiologist Recommendation STAT Creatinine as needed:->No What reading provider will be dictating this exam?->CRC FINDINGS: Lower Chest: There is ectasia identified of the descending thoracic aorta largest AP dimension measures 4.3 cm. Some atelectatic changes seen at the left lung base with trace effusion. Organs: The liver is homogeneous in appearance. No stones in the gallbladder. The spleen is unremarkable. Pancreas is homogeneous. Both adrenal glands are within normal limits. Symmetric enhancement of the renal parenchyma. No stones or distension seen in the renal collecting system. GI/Bowel: Stomach is unremarkable. No wall thickening. The small bowel is within normal limits. No mucosal abnormality. Stool seen scattered diffusely throughout the colon. There is an abnormal mass identified both solid and low in attenuation to suggest a large in a chronic malignancy measuring 12.8 x 8.3 cm. This is increased in size when compared to the prior examination where this area measured 6.6 x 9.4 cm. The mass lies adjacent to the inferior aspect of the splenic flexure. A colonic malignancy is of concern or possibly an appendiceal malignancy. The right ovary appears to be unremarkable in separate from this area of abnormality. Pelvis: There is a large cystic structure identified within the left adnexa which is stable and unchanged compared to the prior examination measuring 9.0 x 6.5 cm. Ultrasound follow-up is recommended of this left adnexal cyst. The bladder is unremarkable. Uterus is unremarkable. Peritoneum/Retroperitoneum: There is no abdominal or retroperitoneal lymphadenopathy. No pelvic adenopathy. Atherosclerotic disease diffusely throughout the abdominal aorta and iliac vessels. Bones/Soft Tissues: Bony structures reveal degenerative changes seen within the spine and pelvis. There is no ventral abdominal wall mass. Small right inguinal hernia containing fat only. Mixed attenuation soft tissue mass seen in the right flank and lower quadrant adjacent to the splenic flexure concerning for an underlying soft tissue mass with necrosis concerning for malignancy. There is some surrounding stranding and minimal fluid in the right flank and inferior to the liver. Exophytic mass and malignancy of the colon is of concern. This area is increased in size when compared to the prior study.  Stable large cystic structure in the left adnexa in which follow-up ultrasound is recommended for further characterization given the size. IR FLUORO GUIDED CVA DEVICE REPLACEMENT    Result Date: 12/7/2022  EXAMINATION: IR FLUORO GUIDED CVA DEVICE REPLACEMENT DATE AND TIME:12/7/2022 9:15 AM CLINICAL HISTORY: K65.1 Intra-abdominal abscess (HCC) ICD10 COMPARISON: None available. Radiation dose: 18.88 mGy. After discussing the procedure and possible complications with the patient, informed consent was obtained. The patient was placed on the Special Procedures table. The right upper extremity was sterilely prepared using 2% chlorhexidine including the existing PICC line and then draped with sterile towels and a body-sized sterile drape. All personnel in the Special Procedures Room donned caps and surgical masks. In addition, the  and first assistant donned sterile gowns and gloves after proper hand cleansing. A pre-procedure time out was performed in order to assure the correct patient and procedure. Local anesthetic was administered. A guidewire was advanced through the existing PICC line into the vein with fluoroscopic guidance, the old PICC line was removed and a sheath was placed over the guidewire. A new 5-Andorran dual-lumen PICC was advanced through the sheath, into the brachial vein, up the arm and into the central vasculature. It was positioned appropriately. The sheath was removed. The catheter was shown to aspirate and infuse properly. The flange of the catheter was affixed to the arm using a PICC securement device. A spot image of the chest showed the tip of the PICC line to lie in the right atrium. The patient tolerated the procedure well and without complications. CONCLUSION: SUCCESSFUL PICC EXCHANGE WITHOUT IMMEDIATE COMPLICATIONS. Endoscopic investigations:     Assessmentand Plan:  80 y.o. female with history of intra-abdominal mass possible neoplasm.   Had intra-abdominal abscess drained percutaneously in the past.  Patient has no fever or any symptoms of peritonitis, has been tolerating diet well.,  Considering her age and comorbid conditions she is a high risk for any invasive procedures and this was discussed with patient's son and patient.,  Potential complications of the procedure was discussed with them and need for surgical intervention was also discussed, patient definitely wants to proceed with colonoscopy.,  Will add Xarelto 3 days prior to procedure and will also check CEA   Diagnosis Orders   1. Right upper quadrant abdominal mass  CEA    Endoscopy, colon, diagnostic        Return in about 3 months (around 3/29/2023). Dionne Bernheim, MD   Staff Gastroenterologist  [facility]    Please note this report has been partially produced using speech recognition software and may cause contain errors related to thatsystem including grammar, punctuation and spelling as well as words and phrases that may seem inappropriate. If there are questions or concerns please feel free to contact me to clarify.

## 2022-12-30 LAB — CARCINOEMBRYONIC ANTIGEN: 19.7 NG/ML

## 2023-01-01 ENCOUNTER — OFFICE VISIT (OUTPATIENT)
Dept: GERIATRIC MEDICINE | Age: 88
End: 2023-01-01
Payer: MEDICARE

## 2023-01-01 DIAGNOSIS — R10.84 GENERALIZED ABDOMINAL PAIN: Primary | ICD-10-CM

## 2023-01-01 DIAGNOSIS — Z51.5 END OF LIFE CARE: ICD-10-CM

## 2023-01-01 DIAGNOSIS — R62.7 ADULT FAILURE TO THRIVE: ICD-10-CM

## 2023-01-01 DIAGNOSIS — C18.9 MALIGNANT NEOPLASM OF COLON, UNSPECIFIED PART OF COLON (HCC): ICD-10-CM

## 2023-01-01 LAB
AMORPH SED URNS QL MICRO: ABNORMAL
BACTERIA UR CULT: ABNORMAL
BACTERIA UR CULT: ABNORMAL
BACTERIA URNS QL MICRO: ABNORMAL /HPF
BILIRUB UR QL STRIP: NEGATIVE
CLARITY UR: ABNORMAL
COLOR UR: YELLOW
EPI CELLS #/AREA URNS AUTO: ABNORMAL /HPF (ref 0–5)
GLUCOSE UR STRIP-MCNC: NEGATIVE MG/DL
HGB UR QL STRIP: NEGATIVE
HYALINE CASTS #/AREA URNS AUTO: ABNORMAL /HPF (ref 0–5)
KETONES UR STRIP-MCNC: ABNORMAL MG/DL
LEUKOCYTE ESTERASE UR QL STRIP: ABNORMAL
NITRITE UR QL STRIP: POSITIVE
ORGANISM: ABNORMAL
PH UR STRIP: 5.5 [PH] (ref 5–9)
PROT UR STRIP-MCNC: 30 MG/DL
SP GR UR STRIP: 1.02 (ref 1–1.03)
UROBILINOGEN UR STRIP-ACNC: 0.2 E.U./DL
WBC #/AREA URNS AUTO: ABNORMAL /HPF (ref 0–5)
WBC #/AREA URNS HPF: ABNORMAL /HPF (ref 0–5)

## 2023-01-01 PROCEDURE — 99310 SBSQ NF CARE HIGH MDM 45: CPT | Performed by: NURSE PRACTITIONER

## 2023-01-01 PROCEDURE — G9692 HOSP RECD BY PT DUR MSMT PER: HCPCS | Performed by: NURSE PRACTITIONER

## 2023-01-01 PROCEDURE — G8484 FLU IMMUNIZE NO ADMIN: HCPCS | Performed by: NURSE PRACTITIONER

## 2023-01-04 ENCOUNTER — ANESTHESIA (OUTPATIENT)
Dept: ENDOSCOPY | Age: 88
End: 2023-01-04
Payer: MEDICARE

## 2023-01-04 ENCOUNTER — HOSPITAL ENCOUNTER (OUTPATIENT)
Age: 88
Setting detail: OUTPATIENT SURGERY
Discharge: HOME | DRG: 871 | End: 2023-01-04
Attending: SPECIALIST | Admitting: SPECIALIST
Payer: MEDICARE

## 2023-01-04 ENCOUNTER — ANESTHESIA EVENT (OUTPATIENT)
Dept: ENDOSCOPY | Age: 88
End: 2023-01-04
Payer: MEDICARE

## 2023-01-04 ENCOUNTER — HOSPITAL ENCOUNTER (INPATIENT)
Age: 88
LOS: 5 days | Discharge: NURSING, REHAB OR LONG-TERM CARE | DRG: 871 | End: 2023-01-10
Attending: EMERGENCY MEDICINE | Admitting: INTERNAL MEDICINE
Payer: MEDICARE

## 2023-01-04 ENCOUNTER — APPOINTMENT (OUTPATIENT)
Dept: CT IMAGING | Age: 88
DRG: 871 | End: 2023-01-04
Payer: MEDICARE

## 2023-01-04 ENCOUNTER — APPOINTMENT (OUTPATIENT)
Dept: GENERAL RADIOLOGY | Age: 88
DRG: 871 | End: 2023-01-04
Payer: MEDICARE

## 2023-01-04 ENCOUNTER — TELEPHONE (OUTPATIENT)
Dept: INFECTIOUS DISEASES | Age: 88
End: 2023-01-04

## 2023-01-04 VITALS
HEART RATE: 70 BPM | HEIGHT: 59 IN | OXYGEN SATURATION: 93 % | SYSTOLIC BLOOD PRESSURE: 110 MMHG | DIASTOLIC BLOOD PRESSURE: 68 MMHG | BODY MASS INDEX: 30.24 KG/M2 | RESPIRATION RATE: 16 BRPM | WEIGHT: 150 LBS | TEMPERATURE: 100.3 F

## 2023-01-04 DIAGNOSIS — R78.81 GRAM-NEGATIVE BACTEREMIA: ICD-10-CM

## 2023-01-04 DIAGNOSIS — J18.9 PNEUMONIA OF LEFT LOWER LOBE DUE TO INFECTIOUS ORGANISM: Primary | ICD-10-CM

## 2023-01-04 DIAGNOSIS — R53.1 GENERALIZED WEAKNESS: ICD-10-CM

## 2023-01-04 DIAGNOSIS — R19.01 RIGHT UPPER QUADRANT ABDOMINAL MASS: ICD-10-CM

## 2023-01-04 DIAGNOSIS — E86.0 DEHYDRATION: ICD-10-CM

## 2023-01-04 DIAGNOSIS — R51.9 ACUTE NONINTRACTABLE HEADACHE, UNSPECIFIED HEADACHE TYPE: ICD-10-CM

## 2023-01-04 PROBLEM — D12.4 ADENOMATOUS POLYP OF DESCENDING COLON: Status: ACTIVE | Noted: 2023-01-01

## 2023-01-04 PROBLEM — D12.6 ADENOMATOUS POLYP OF COLON: Status: ACTIVE | Noted: 2023-01-04

## 2023-01-04 PROBLEM — K63.89 COLONIC MASS: Status: ACTIVE | Noted: 2022-01-01

## 2023-01-04 LAB
ALBUMIN SERPL-MCNC: 3 G/DL (ref 3.5–4.6)
ALP BLD-CCNC: 53 U/L (ref 40–130)
ALT SERPL-CCNC: 14 U/L (ref 0–33)
ANION GAP SERPL CALCULATED.3IONS-SCNC: 18 MEQ/L (ref 9–15)
AST SERPL-CCNC: 28 U/L (ref 0–35)
BASOPHILS ABSOLUTE: 0 K/UL (ref 0–0.2)
BASOPHILS RELATIVE PERCENT: 0.2 %
BILIRUB SERPL-MCNC: 0.8 MG/DL (ref 0.2–0.7)
BUN BLDV-MCNC: 20 MG/DL (ref 8–23)
CALCIUM SERPL-MCNC: 9.3 MG/DL (ref 8.5–9.9)
CHLORIDE BLD-SCNC: 97 MEQ/L (ref 95–107)
CO2: 21 MEQ/L (ref 20–31)
CREAT SERPL-MCNC: 0.82 MG/DL (ref 0.5–0.9)
EOSINOPHILS ABSOLUTE: 0 K/UL (ref 0–0.7)
EOSINOPHILS RELATIVE PERCENT: 0 %
GFR SERPL CREATININE-BSD FRML MDRD: >60 ML/MIN/{1.73_M2}
GLOBULIN: 3.6 G/DL (ref 2.3–3.5)
GLUCOSE BLD-MCNC: 152 MG/DL (ref 70–99)
HCT VFR BLD CALC: 34.6 % (ref 37–47)
HEMOGLOBIN: 11.2 G/DL (ref 12–16)
INFLUENZA A BY PCR: NEGATIVE
INFLUENZA B BY PCR: NEGATIVE
LYMPHOCYTES ABSOLUTE: 0.8 K/UL (ref 1–4.8)
LYMPHOCYTES RELATIVE PERCENT: 4.6 %
MCH RBC QN AUTO: 26.8 PG (ref 27–31.3)
MCHC RBC AUTO-ENTMCNC: 32.2 % (ref 33–37)
MCV RBC AUTO: 83.1 FL (ref 79.4–94.8)
MONOCYTES ABSOLUTE: 1.1 K/UL (ref 0.2–0.8)
MONOCYTES RELATIVE PERCENT: 6.4 %
NEUTROPHILS ABSOLUTE: 15.3 K/UL (ref 1.4–6.5)
NEUTROPHILS RELATIVE PERCENT: 88.8 %
PDW BLD-RTO: 16.4 % (ref 11.5–14.5)
PLATELET # BLD: 213 K/UL (ref 130–400)
POTASSIUM SERPL-SCNC: 3 MEQ/L (ref 3.4–4.9)
PROCALCITONIN: 0.63 NG/ML (ref 0–0.15)
RBC # BLD: 4.17 M/UL (ref 4.2–5.4)
SARS-COV-2, NAAT: NOT DETECTED
SODIUM BLD-SCNC: 136 MEQ/L (ref 135–144)
TOTAL PROTEIN: 6.6 G/DL (ref 6.3–8)
WBC # BLD: 17.3 K/UL (ref 4.8–10.8)

## 2023-01-04 PROCEDURE — 2580000003 HC RX 258: Performed by: EMERGENCY MEDICINE

## 2023-01-04 PROCEDURE — 7100000011 HC PHASE II RECOVERY - ADDTL 15 MIN: Performed by: SPECIALIST

## 2023-01-04 PROCEDURE — 6360000002 HC RX W HCPCS: Performed by: NURSE ANESTHETIST, CERTIFIED REGISTERED

## 2023-01-04 PROCEDURE — 70450 CT HEAD/BRAIN W/O DYE: CPT

## 2023-01-04 PROCEDURE — 3609027000 HC COLONOSCOPY: Performed by: SPECIALIST

## 2023-01-04 PROCEDURE — 87635 SARS-COV-2 COVID-19 AMP PRB: CPT

## 2023-01-04 PROCEDURE — 2580000003 HC RX 258

## 2023-01-04 PROCEDURE — 2580000003 HC RX 258: Performed by: SPECIALIST

## 2023-01-04 PROCEDURE — 3700000001 HC ADD 15 MINUTES (ANESTHESIA): Performed by: SPECIALIST

## 2023-01-04 PROCEDURE — 6370000000 HC RX 637 (ALT 250 FOR IP): Performed by: PHYSICIAN ASSISTANT

## 2023-01-04 PROCEDURE — 3700000000 HC ANESTHESIA ATTENDED CARE: Performed by: SPECIALIST

## 2023-01-04 PROCEDURE — 45380 COLONOSCOPY AND BIOPSY: CPT | Performed by: SPECIALIST

## 2023-01-04 PROCEDURE — 96374 THER/PROPH/DIAG INJ IV PUSH: CPT

## 2023-01-04 PROCEDURE — 6360000002 HC RX W HCPCS: Performed by: PHYSICIAN ASSISTANT

## 2023-01-04 PROCEDURE — 2709999900 HC NON-CHARGEABLE SUPPLY: Performed by: SPECIALIST

## 2023-01-04 PROCEDURE — 88305 TISSUE EXAM BY PATHOLOGIST: CPT

## 2023-01-04 PROCEDURE — 6370000000 HC RX 637 (ALT 250 FOR IP): Performed by: SPECIALIST

## 2023-01-04 PROCEDURE — 36415 COLL VENOUS BLD VENIPUNCTURE: CPT

## 2023-01-04 PROCEDURE — 0DBH8ZX EXCISION OF CECUM, VIA NATURAL OR ARTIFICIAL OPENING ENDOSCOPIC, DIAGNOSTIC: ICD-10-PCS | Performed by: SPECIALIST

## 2023-01-04 PROCEDURE — 85025 COMPLETE CBC W/AUTO DIFF WBC: CPT

## 2023-01-04 PROCEDURE — 0DBK8ZX EXCISION OF ASCENDING COLON, VIA NATURAL OR ARTIFICIAL OPENING ENDOSCOPIC, DIAGNOSTIC: ICD-10-PCS | Performed by: SPECIALIST

## 2023-01-04 PROCEDURE — 88342 IMHCHEM/IMCYTCHM 1ST ANTB: CPT

## 2023-01-04 PROCEDURE — 84145 PROCALCITONIN (PCT): CPT

## 2023-01-04 PROCEDURE — 88341 IMHCHEM/IMCYTCHM EA ADD ANTB: CPT

## 2023-01-04 PROCEDURE — 87502 INFLUENZA DNA AMP PROBE: CPT

## 2023-01-04 PROCEDURE — 2500000003 HC RX 250 WO HCPCS: Performed by: NURSE ANESTHETIST, CERTIFIED REGISTERED

## 2023-01-04 PROCEDURE — 96361 HYDRATE IV INFUSION ADD-ON: CPT

## 2023-01-04 PROCEDURE — 6370000000 HC RX 637 (ALT 250 FOR IP): Performed by: EMERGENCY MEDICINE

## 2023-01-04 PROCEDURE — 81001 URINALYSIS AUTO W/SCOPE: CPT

## 2023-01-04 PROCEDURE — 83605 ASSAY OF LACTIC ACID: CPT

## 2023-01-04 PROCEDURE — 45385 COLONOSCOPY W/LESION REMOVAL: CPT | Performed by: SPECIALIST

## 2023-01-04 PROCEDURE — 0DBM8ZX EXCISION OF DESCENDING COLON, VIA NATURAL OR ARTIFICIAL OPENING ENDOSCOPIC, DIAGNOSTIC: ICD-10-PCS | Performed by: SPECIALIST

## 2023-01-04 PROCEDURE — 80053 COMPREHEN METABOLIC PANEL: CPT

## 2023-01-04 PROCEDURE — 99285 EMERGENCY DEPT VISIT HI MDM: CPT

## 2023-01-04 PROCEDURE — 7100000010 HC PHASE II RECOVERY - FIRST 15 MIN: Performed by: SPECIALIST

## 2023-01-04 PROCEDURE — 71045 X-RAY EXAM CHEST 1 VIEW: CPT

## 2023-01-04 RX ORDER — SODIUM CHLORIDE 9 MG/ML
INJECTION, SOLUTION INTRAVENOUS
Status: COMPLETED
Start: 2023-01-04 | End: 2023-01-04

## 2023-01-04 RX ORDER — MAGNESIUM HYDROXIDE 1200 MG/15ML
LIQUID ORAL PRN
Status: DISCONTINUED | OUTPATIENT
Start: 2023-01-04 | End: 2023-01-04 | Stop reason: ALTCHOICE

## 2023-01-04 RX ORDER — ACETAMINOPHEN 500 MG
1000 TABLET ORAL ONCE
Status: COMPLETED | OUTPATIENT
Start: 2023-01-04 | End: 2023-01-04

## 2023-01-04 RX ORDER — SODIUM CHLORIDE 0.9 % (FLUSH) 0.9 %
5-40 SYRINGE (ML) INJECTION EVERY 12 HOURS SCHEDULED
Status: CANCELLED | OUTPATIENT
Start: 2023-01-04

## 2023-01-04 RX ORDER — ONDANSETRON 2 MG/ML
4 INJECTION INTRAMUSCULAR; INTRAVENOUS ONCE
Status: COMPLETED | OUTPATIENT
Start: 2023-01-04 | End: 2023-01-04

## 2023-01-04 RX ORDER — SODIUM CHLORIDE 0.9 % (FLUSH) 0.9 %
5-40 SYRINGE (ML) INJECTION PRN
Status: CANCELLED | OUTPATIENT
Start: 2023-01-04

## 2023-01-04 RX ORDER — SODIUM CHLORIDE 9 MG/ML
INJECTION, SOLUTION INTRAVENOUS PRN
Status: CANCELLED | OUTPATIENT
Start: 2023-01-04

## 2023-01-04 RX ORDER — 0.9 % SODIUM CHLORIDE 0.9 %
1000 INTRAVENOUS SOLUTION INTRAVENOUS ONCE
Status: COMPLETED | OUTPATIENT
Start: 2023-01-04 | End: 2023-01-04

## 2023-01-04 RX ORDER — PROPOFOL 10 MG/ML
INJECTION, EMULSION INTRAVENOUS PRN
Status: DISCONTINUED | OUTPATIENT
Start: 2023-01-04 | End: 2023-01-04 | Stop reason: SDUPTHER

## 2023-01-04 RX ORDER — ETOMIDATE 2 MG/ML
INJECTION INTRAVENOUS PRN
Status: DISCONTINUED | OUTPATIENT
Start: 2023-01-04 | End: 2023-01-04 | Stop reason: SDUPTHER

## 2023-01-04 RX ORDER — GLYCOPYRROLATE 1 MG/5 ML
SYRINGE (ML) INTRAVENOUS PRN
Status: DISCONTINUED | OUTPATIENT
Start: 2023-01-04 | End: 2023-01-04 | Stop reason: SDUPTHER

## 2023-01-04 RX ORDER — SODIUM CHLORIDE 9 MG/ML
INJECTION, SOLUTION INTRAVENOUS CONTINUOUS
Status: DISCONTINUED | OUTPATIENT
Start: 2023-01-04 | End: 2023-01-04 | Stop reason: HOSPADM

## 2023-01-04 RX ORDER — SIMETHICONE 20 MG/.3ML
EMULSION ORAL PRN
Status: DISCONTINUED | OUTPATIENT
Start: 2023-01-04 | End: 2023-01-04 | Stop reason: ALTCHOICE

## 2023-01-04 RX ORDER — LIDOCAINE HYDROCHLORIDE 20 MG/ML
INJECTION, SOLUTION INFILTRATION; PERINEURAL PRN
Status: DISCONTINUED | OUTPATIENT
Start: 2023-01-04 | End: 2023-01-04 | Stop reason: SDUPTHER

## 2023-01-04 RX ADMIN — LIDOCAINE HYDROCHLORIDE 60 MG: 20 INJECTION, SOLUTION INFILTRATION; PERINEURAL at 11:53

## 2023-01-04 RX ADMIN — POTASSIUM BICARBONATE 50 MEQ: 978 TABLET, EFFERVESCENT ORAL at 22:30

## 2023-01-04 RX ADMIN — SODIUM CHLORIDE 1000 ML: 9 INJECTION, SOLUTION INTRAVENOUS at 19:11

## 2023-01-04 RX ADMIN — ACETAMINOPHEN 1000 MG: 500 TABLET ORAL at 19:10

## 2023-01-04 RX ADMIN — SODIUM CHLORIDE: 9 INJECTION, SOLUTION INTRAVENOUS at 11:44

## 2023-01-04 RX ADMIN — Medication 0.2 MG: at 11:50

## 2023-01-04 RX ADMIN — PROPOFOL 50 MG: 10 INJECTION, EMULSION INTRAVENOUS at 11:53

## 2023-01-04 RX ADMIN — ONDANSETRON 4 MG: 2 INJECTION INTRAMUSCULAR; INTRAVENOUS at 23:18

## 2023-01-04 RX ADMIN — ETOMIDATE 15 MG: 2 INJECTION, SOLUTION INTRAVENOUS at 11:53

## 2023-01-04 ASSESSMENT — PAIN SCALES - GENERAL: PAINLEVEL_OUTOF10: 5

## 2023-01-04 ASSESSMENT — ENCOUNTER SYMPTOMS
ABDOMINAL DISTENTION: 0
COUGH: 0
NAUSEA: 0
CHEST TIGHTNESS: 0
ABDOMINAL PAIN: 0
SORE THROAT: 0
VOMITING: 0
EYE DISCHARGE: 0
PHOTOPHOBIA: 0
WHEEZING: 0
SHORTNESS OF BREATH: 0
SHORTNESS OF BREATH: 1

## 2023-01-04 ASSESSMENT — PAIN DESCRIPTION - PAIN TYPE: TYPE: ACUTE PAIN

## 2023-01-04 ASSESSMENT — PAIN DESCRIPTION - LOCATION: LOCATION: HEAD

## 2023-01-04 ASSESSMENT — PAIN - FUNCTIONAL ASSESSMENT
PAIN_FUNCTIONAL_ASSESSMENT: 0-10
PAIN_FUNCTIONAL_ASSESSMENT: 0-10

## 2023-01-04 ASSESSMENT — PAIN DESCRIPTION - DESCRIPTORS: DESCRIPTORS: ACHING

## 2023-01-04 NOTE — H&P
Patient Name: Monica Adame  : 1932  MRN: [id number]  DATE: 23      ENDOSCOPY  History and Physical    Procedure:    [x] Diagnostic Colonoscopy       [] Screening Colonoscopy  [] EGD      [] ERCP      [] EUS       [] Other    [x] Previous office notes/History and Physical reviewed from the patients chart. Please see EMR for further details of HPI. I have examined the patient's status immediately prior to the procedure and:      Indications/HPI:    []Abdominal Pain  []Cancer- GI/Lung  []Fhx of colon CA/polyps  []History of Polyps  []Redds   []Melena  []Abnormal Imaging  []Dysphagia    []Persistent Pneumonia  []Anemia  []Food Impaction  []History of Polyps  []GI Bleed  []Pulmonary nodule/Mass  []Change in bowel habits []Heartburn/Reflux  []Rectal Bleed (BRBPR)  []Chest Pain - Non Cardiac []Heme (+) Stoo  l[]Ulcers  []Constipation  []Hemoptysis   []Varices  []Diarrhea  []Hypoxemia  []Nausea/Vomiting  []Screening   []Crohns/Colitis  []Other abnormal CT abdomen. Anesthesia:   [x] MAC [] Moderate Sedation   [] General   [] None     ROS: 12 pt Review of Symptoms was negative unless mentioned above    Medications:   Prior to Admission medications    Medication Sig Start Date End Date Taking?  Authorizing Provider   sodium-potassium-mag sulfate (SUPREP) 17.5-3.13-1.6 GM/177ML SOLN solution As directed  Patient not taking: Reported on 22   Chetan Comer MD   piperacillin-tazobactam (ZOSYN) 40.5 (36-4.5) g injection  22   Historical Provider, MD   albuterol sulfate HFA (PROVENTIL;VENTOLIN;PROAIR) 108 (90 Base) MCG/ACT inhaler Inhale 2 puffs into the lungs every 6 hours as needed for Wheezing 22   Keyonna Velarde MD   digoxin (LANOXIN) 125 MCG tablet Take 1 tablet by mouth daily  Patient not taking: Reported on 22   Keyonna Velarde MD   sacubitril-valsartan (ENTRESTO) 24-26 MG per tablet Take 1 tablet by mouth 2 times daily 22 Cydney Velarde MD   sucralfate (CARAFATE) 1 GM tablet Take 1 tablet by mouth 4 times daily  Patient not taking: Reported on 1/4/2023 11/21/22   Cydney Velarde MD   Lactobacillus TABS Take by mouth daily    Historical Provider, MD   rivaroxaban (XARELTO) 15 MG TABS tablet Take 1 tablet by mouth daily 9/29/22   Chuckie Kussmaul, APRN - CNP   meclizine (ANTIVERT) 25 MG tablet Take by mouth daily 9/12/22   Historical Provider, MD   Vitamin D (CHOLECALCIFEROL) 50 MCG (2000 UT) TABS tablet Take 1 tablet by mouth Daily with supper  Patient taking differently: Take 2,000 Units by mouth Daily with supper Indications: Nutritional Support 8/18/22   Deb Oseguera DO   potassium chloride (KLOR-CON M) 20 MEQ extended release tablet Take 20 mEq by mouth 2 times daily Indications: Nutritional Support    Historical Provider, MD   furosemide (LASIX) 20 MG tablet TAKE 1 TABLET DAILY  Patient taking differently: Take 20 mg by mouth daily Indications: Congestive Heart Failure 4/4/22   Chuckie Kussmaul, APRN - CNP   amLODIPine (NORVASC) 5 MG tablet Take 1 tablet by mouth daily 4/1/22 8/17/22  Jorge Franco DO   pantoprazole (PROTONIX) 40 MG tablet TAKE 1 TABLET DAILY  Patient taking differently: Take 40 mg by mouth daily Indications: Ulcer 2/11/22   ADRIENNE Cuello CNP   carvedilol (COREG) 6.25 MG tablet TAKE 1 TABLET TWICE A DAY  Patient taking differently: Take 6.25 mg by mouth 2 times daily Indications: High Blood Pressure Disorder 1/17/22   ADRIENNE Cuello CNP   nitroGLYCERIN (NITROSTAT) 0.4 MG SL tablet up to max of 3 total doses. If no relief after 1 dose, call 911. Patient taking differently: Place 0.4 mg under the tongue every 5 minutes as needed Indications: Chest Pain up to max of 3 total doses.  If no relief after 1 dose, call 911. 3/1/21   Vasiliy Binder, APRN - NP   ferrous sulfate (IRON 325) 325 (65 Fe) MG tablet Take 1 tablet by mouth 2 times daily (with meals)  Patient taking differently: Take 325 mg by mouth 2 times daily (with meals) Indications: Anemia 3/1/21   ADRIENNE Pete - NP   Carboxymethylcellulose Sodium (EYE DROPS OP) Apply 1 drop to eye as needed (Dry eyes) Indications: Systane     Historical Provider, MD   Boswellia-Glucosamine-Vit D (OSTEO BI-FLEX ONE PER DAY) TABS Take 1 tablet by mouth daily Indications: Nutritional Support    Historical Provider, MD   Multiple Vitamins-Minerals (CENTRUM SILVER ULTRA WOMENS) TABS Take 1 tablet by mouth daily Indications: Nutritional Support    Historical Provider, MD   vitamin B-12 (CYANOCOBALAMIN) 1000 MCG tablet Take 1,000 mcg by mouth daily Indications: Nutritional Support    Historical Provider, MD   citalopram (CELEXA) 20 MG tablet Take 20 mg by mouth daily Indications: Depression 4/1/16   Historical Provider, MD   SYNTHROID 88 MCG tablet Take 88 mcg by mouth daily Indications: Underactive Thyroid 3/19/16   Historical Provider, MD       Allergies:    Allergies   Allergen Reactions    Latex     Tape Zoie Beckford Tape]         History of allergic reaction to anesthesia:  No    Past Medical History:  Past Medical History:   Diagnosis Date    Ascending aortic aneurysm 6/23/2017    Charcot Arleen Tooth muscular atrophy     CHF (congestive heart failure) (HCC)     Chronic diastolic CHF (congestive heart failure) ([address] 4/1/2022    Depression     Descending aortic aneurysm ([facility] 6/23/2017    Essential hypertension 2/16/2018    Headache     HTN (hypertension)     Impaired mobility and activities of daily living     Lumbar stenosis with neurogenic claudication     Myelopathy (HCC)     Osteoarthritis        Past Surgical History:  Past Surgical History:   Procedure Laterality Date    IR INS PICC VAD W SQ PORT GREATER THAN 5  9/23/2022    IR INS PICC VAD W SQ PORT GREATER THAN 5 9/23/2022 [facility] SPECIAL PROCEDURE    JOINT REPLACEMENT Bilateral     knees    OTHER SURGICAL HISTORY Right 07/21/2022    cat scan guided abdominal mass aspiration with drain placement by Dr. Kuldip Alvarado Left 02/25/2021    LEFT PLEURAL CATHETER INSERTION performed by Ayan Ambrosio MD at [facility] Left 01/29/2021    total of 755 cc removed per Dr Joe Reid specimen sent to lab       Social History:  Social History     Tobacco Use    Smoking status: Never    Smokeless tobacco: Never   Vaping Use    Vaping Use: Never used   Substance Use Topics    Alcohol use: No     Alcohol/week: 0.0 standard drinks    Drug use: No       Vital Signs:   Vitals:    01/04/23 1130   BP: (!) 99/56   Pulse: 81   Resp: 18   Temp: 100.3 °F (37.9 °C)   SpO2: 96%        Physical Exam:  Cardiac:  [x]WNL  []Comments:  Pulmonary:  [x]WNL   []Comments:   Neuro/Mental Status:  [x]WNL  []Comments:  Abdominal:  [x]WNL    []Comments:  Other:   []WNL  []Comments:    Informed Consent:  The risks and benefits of the procedure have been discussed with either the patient or if they cannot consent, their representative. Assessment:  Patient examined and appropriate for planned sedation and procedure. Plan:  Proceed with planned sedation and procedure as above.     Raquel Medrano MD  11:51 AM

## 2023-01-04 NOTE — TELEPHONE ENCOUNTER
Patient's son called to inquire if appt for tomorrow, 1/5/23 is needed as patient just completed a colonoscopy today, 1/4/23 with GI, Dr Federica Souza. Update sent to ID Physician Dr Aryan Solis. Per consult with Dr Aryan Solis, she was pleased the procedure was completed and Biopsies were obtained of the Mass. Dr Aryan Solis request to see patient next week after she is able to Review the results and discuss with IR-Dr Halima Wheeler and/or GI-Dr Federica Souza. Return call to Mr Lisa Cruz, and advised. He verbalized understanding, said he is expecting a call from GI dept for a F/u for next week as well.

## 2023-01-05 ENCOUNTER — APPOINTMENT (OUTPATIENT)
Dept: CT IMAGING | Age: 88
DRG: 871 | End: 2023-01-05
Payer: MEDICARE

## 2023-01-05 PROBLEM — R53.1 WEAKNESS: Status: ACTIVE | Noted: 2023-01-01

## 2023-01-05 LAB
ACINETOBACTER CALCOAC BAUMANNII COMPLEX BY PCR: NOT DETECTED
ALBUMIN SERPL-MCNC: 2.9 G/DL (ref 3.5–4.6)
ALP BLD-CCNC: 52 U/L (ref 40–130)
ALT SERPL-CCNC: 14 U/L (ref 0–33)
ANION GAP SERPL CALCULATED.3IONS-SCNC: 15 MEQ/L (ref 9–15)
AST SERPL-CCNC: 28 U/L (ref 0–35)
BACTERIA: NEGATIVE /HPF
BACTEROIDES FRAGILIS BY PCR: NOT DETECTED
BILIRUB SERPL-MCNC: 0.6 MG/DL (ref 0.2–0.7)
BILIRUBIN DIRECT: <0.2 MG/DL (ref 0–0.4)
BILIRUBIN URINE: ABNORMAL
BILIRUBIN, INDIRECT: ABNORMAL MG/DL (ref 0–0.6)
BLOOD, URINE: ABNORMAL
BUN BLDV-MCNC: 24 MG/DL (ref 8–23)
CALCIUM SERPL-MCNC: 9.3 MG/DL (ref 8.5–9.9)
CANDIDA ALBICANS BY PCR: NOT DETECTED
CANDIDA AURIS BY PCR: NOT DETECTED
CANDIDA GLABRATA BY PCR: NOT DETECTED
CANDIDA KRUSEI BY PCR: NOT DETECTED
CANDIDA PARAPSILOSIS BY PCR: NOT DETECTED
CANDIDA TROPICALIS BY PCR: NOT DETECTED
CHLORIDE BLD-SCNC: 97 MEQ/L (ref 95–107)
CLARITY: ABNORMAL
CO2: 22 MEQ/L (ref 20–31)
COLOR: ABNORMAL
CREAT SERPL-MCNC: 0.88 MG/DL (ref 0.5–0.9)
CRYPTOCOCCUS NEOFORMANS/GATTII BY PCR: NOT DETECTED
ENTEROBACTER CLOACAE COMPLEX BY PCR: NOT DETECTED
ENTEROBACTERALES BY PCR: NOT DETECTED
ENTEROCOCCUS FAECALIS BY PCR: NOT DETECTED
ENTEROCOCCUS FAECIUM BY PCR: NOT DETECTED
EPITHELIAL CELLS, UA: ABNORMAL /HPF (ref 0–5)
ESCHERICHIA COLI BY PCR: NOT DETECTED
GFR SERPL CREATININE-BSD FRML MDRD: >60 ML/MIN/{1.73_M2}
GLUCOSE BLD-MCNC: 213 MG/DL (ref 70–99)
GLUCOSE URINE: NEGATIVE MG/DL
HAEMOPHILUS INFLUENZAE BY PCR: NOT DETECTED
HYALINE CASTS: ABNORMAL /LPF (ref 0–5)
KETONES, URINE: ABNORMAL MG/DL
KLEBSIELLA AEROGENES BY PCR: NOT DETECTED
KLEBSIELLA OXYTOCA BY PCR: NOT DETECTED
KLEBSIELLA PNEUMONIAE GROUP BY PCR: NOT DETECTED
LACTIC ACID: 1.5 MMOL/L (ref 0.5–2.2)
LACTIC ACID: 1.8 MMOL/L (ref 0.5–2.2)
LEUKOCYTE ESTERASE, URINE: ABNORMAL
LISTERIA MONOCYTOGENES BY PCR: NOT DETECTED
NEISSERIA MENINGITIDIS BY PCR: NOT DETECTED
NITRITE, URINE: NEGATIVE
PH UA: 5 (ref 5–9)
POTASSIUM REFLEX MAGNESIUM: 4.1 MEQ/L (ref 3.4–4.9)
PROCALCITONIN: 0.8 NG/ML (ref 0–0.15)
PROTEIN UA: 30 MG/DL
PROTEUS SPECIES BY PCR: NOT DETECTED
PSEUDOMONAS AERUGINOSA BY PCR: NOT DETECTED
RBC UA: ABNORMAL /HPF (ref 0–5)
SALMONELLA SPECIES BY PCR: NOT DETECTED
SERRATIA MARCESCENS BY PCR: NOT DETECTED
SODIUM BLD-SCNC: 134 MEQ/L (ref 135–144)
SPECIFIC GRAVITY UA: 1.02 (ref 1–1.03)
STAPHYLOCOCCUS AUREUS BY PCR: NOT DETECTED
STAPHYLOCOCCUS EPIDERMIDIS BY PCR: NOT DETECTED
STAPHYLOCOCCUS LUGDUNENSIS BY PCR: NOT DETECTED
STAPHYLOCOCCUS SPECIES BY PCR: NOT DETECTED
STENOTROPHOMONAS MALTOPHILIA BY PCR: NOT DETECTED
STREPTOCOCCUS AGALACTIAE BY PCR: NOT DETECTED
STREPTOCOCCUS PNEUMONIAE BY PCR: NOT DETECTED
STREPTOCOCCUS PYOGENES  BY PCR: NOT DETECTED
STREPTOCOCCUS SPECIES BY PCR: NOT DETECTED
TOTAL PROTEIN: 6.5 G/DL (ref 6.3–8)
URINE REFLEX TO CULTURE: ABNORMAL
UROBILINOGEN, URINE: 0.2 E.U./DL
WBC UA: ABNORMAL /HPF (ref 0–5)

## 2023-01-05 PROCEDURE — 84145 PROCALCITONIN (PCT): CPT

## 2023-01-05 PROCEDURE — A4216 STERILE WATER/SALINE, 10 ML: HCPCS | Performed by: INTERNAL MEDICINE

## 2023-01-05 PROCEDURE — 99221 1ST HOSP IP/OBS SF/LOW 40: CPT | Performed by: COLON & RECTAL SURGERY

## 2023-01-05 PROCEDURE — 83605 ASSAY OF LACTIC ACID: CPT

## 2023-01-05 PROCEDURE — 80076 HEPATIC FUNCTION PANEL: CPT

## 2023-01-05 PROCEDURE — 2700000000 HC OXYGEN THERAPY PER DAY

## 2023-01-05 PROCEDURE — 6360000002 HC RX W HCPCS: Performed by: INTERNAL MEDICINE

## 2023-01-05 PROCEDURE — C9113 INJ PANTOPRAZOLE SODIUM, VIA: HCPCS | Performed by: INTERNAL MEDICINE

## 2023-01-05 PROCEDURE — 2580000003 HC RX 258: Performed by: PHYSICIAN ASSISTANT

## 2023-01-05 PROCEDURE — G0378 HOSPITAL OBSERVATION PER HR: HCPCS

## 2023-01-05 PROCEDURE — 2580000003 HC RX 258: Performed by: INTERNAL MEDICINE

## 2023-01-05 PROCEDURE — 74177 CT ABD & PELVIS W/CONTRAST: CPT

## 2023-01-05 PROCEDURE — 6360000002 HC RX W HCPCS: Performed by: PHYSICIAN ASSISTANT

## 2023-01-05 PROCEDURE — 80048 BASIC METABOLIC PNL TOTAL CA: CPT

## 2023-01-05 PROCEDURE — 36415 COLL VENOUS BLD VENIPUNCTURE: CPT

## 2023-01-05 PROCEDURE — 87040 BLOOD CULTURE FOR BACTERIA: CPT

## 2023-01-05 PROCEDURE — 6360000004 HC RX CONTRAST MEDICATION: Performed by: PHYSICIAN ASSISTANT

## 2023-01-05 PROCEDURE — 87150 DNA/RNA AMPLIFIED PROBE: CPT

## 2023-01-05 PROCEDURE — 1210000000 HC MED SURG R&B

## 2023-01-05 PROCEDURE — 99223 1ST HOSP IP/OBS HIGH 75: CPT | Performed by: INTERNAL MEDICINE

## 2023-01-05 RX ORDER — MECLIZINE HYDROCHLORIDE 25 MG/1
25 TABLET ORAL EVERY 6 HOURS PRN
Status: DISCONTINUED | OUTPATIENT
Start: 2023-01-05 | End: 2023-01-10 | Stop reason: HOSPADM

## 2023-01-05 RX ORDER — VITAMIN B COMPLEX
2000 TABLET ORAL
Status: DISCONTINUED | OUTPATIENT
Start: 2023-01-05 | End: 2023-01-10 | Stop reason: HOSPADM

## 2023-01-05 RX ORDER — ALBUTEROL SULFATE 90 UG/1
2 AEROSOL, METERED RESPIRATORY (INHALATION) EVERY 6 HOURS PRN
Status: CANCELLED | OUTPATIENT
Start: 2023-01-05

## 2023-01-05 RX ORDER — DIGOXIN 125 MCG
125 TABLET ORAL DAILY
Status: DISCONTINUED | OUTPATIENT
Start: 2023-01-05 | End: 2023-01-10 | Stop reason: HOSPADM

## 2023-01-05 RX ORDER — LEVOTHYROXINE SODIUM 88 UG/1
88 TABLET ORAL
Status: DISCONTINUED | OUTPATIENT
Start: 2023-01-06 | End: 2023-01-10 | Stop reason: HOSPADM

## 2023-01-05 RX ORDER — L. ACIDOPHILUS/L.BULGARICUS 1MM CELL
2 TABLET ORAL DAILY
Status: DISCONTINUED | OUTPATIENT
Start: 2023-01-05 | End: 2023-01-10 | Stop reason: HOSPADM

## 2023-01-05 RX ORDER — CHOLECALCIFEROL (VITAMIN D3) 125 MCG
1000 CAPSULE ORAL DAILY
Status: DISCONTINUED | OUTPATIENT
Start: 2023-01-05 | End: 2023-01-10 | Stop reason: HOSPADM

## 2023-01-05 RX ORDER — CITALOPRAM 20 MG/1
20 TABLET ORAL DAILY
Status: DISCONTINUED | OUTPATIENT
Start: 2023-01-05 | End: 2023-01-10 | Stop reason: HOSPADM

## 2023-01-05 RX ORDER — M-VIT,TX,IRON,MINS/CALC/FOLIC 27MG-0.4MG
1 TABLET ORAL DAILY
Status: DISCONTINUED | OUTPATIENT
Start: 2023-01-05 | End: 2023-01-10 | Stop reason: HOSPADM

## 2023-01-05 RX ORDER — SODIUM CHLORIDE, SODIUM LACTATE, POTASSIUM CHLORIDE, CALCIUM CHLORIDE 600; 310; 30; 20 MG/100ML; MG/100ML; MG/100ML; MG/100ML
INJECTION, SOLUTION INTRAVENOUS CONTINUOUS
Status: DISCONTINUED | OUTPATIENT
Start: 2023-01-05 | End: 2023-01-06

## 2023-01-05 RX ORDER — PANTOPRAZOLE SODIUM 40 MG/1
40 TABLET, DELAYED RELEASE ORAL
Status: DISCONTINUED | OUTPATIENT
Start: 2023-01-06 | End: 2023-01-10 | Stop reason: HOSPADM

## 2023-01-05 RX ORDER — CARVEDILOL 6.25 MG/1
6.25 TABLET ORAL 2 TIMES DAILY
Status: DISCONTINUED | OUTPATIENT
Start: 2023-01-05 | End: 2023-01-10 | Stop reason: HOSPADM

## 2023-01-05 RX ADMIN — MEROPENEM 1000 MG: 1 INJECTION, POWDER, FOR SOLUTION INTRAVENOUS at 05:04

## 2023-01-05 RX ADMIN — IOPAMIDOL 50 ML: 612 INJECTION, SOLUTION INTRAVENOUS at 01:30

## 2023-01-05 RX ADMIN — SODIUM CHLORIDE, POTASSIUM CHLORIDE, SODIUM LACTATE AND CALCIUM CHLORIDE: 600; 310; 30; 20 INJECTION, SOLUTION INTRAVENOUS at 13:43

## 2023-01-05 RX ADMIN — SODIUM CHLORIDE 40 MG: 9 INJECTION, SOLUTION INTRAMUSCULAR; INTRAVENOUS; SUBCUTANEOUS at 09:41

## 2023-01-05 RX ADMIN — CEFTRIAXONE SODIUM 1000 MG: 1 INJECTION, POWDER, FOR SOLUTION INTRAMUSCULAR; INTRAVENOUS at 04:25

## 2023-01-05 RX ADMIN — MEROPENEM 1000 MG: 1 INJECTION, POWDER, FOR SOLUTION INTRAVENOUS at 21:08

## 2023-01-05 RX ADMIN — MEROPENEM 1000 MG: 1 INJECTION, POWDER, FOR SOLUTION INTRAVENOUS at 12:10

## 2023-01-05 RX ADMIN — VANCOMYCIN HYDROCHLORIDE 1000 MG: 1 INJECTION, POWDER, LYOPHILIZED, FOR SOLUTION INTRAVENOUS at 06:35

## 2023-01-05 RX ADMIN — AZITHROMYCIN MONOHYDRATE 500 MG: 500 INJECTION, POWDER, LYOPHILIZED, FOR SOLUTION INTRAVENOUS at 00:49

## 2023-01-05 NOTE — FLOWSHEET NOTE
Son brought in medication all were copied down and he took them back home again. Electronically signed by Murali Cornell LPN on 5/2/8450 at 8:34 AM    Son here to visit asking about what the doctor had to say explained to him that they will not due surgery here she is very high risk and if he wants her to go to the [address] [place] [address] he can take her or consider palliative care and take her home. Son states he can't take her to his house because he has stairs and she is to weak to climb them He wanted to know if she could go to rehab here told him I would let the  know. He states that the [address] [place] [address] will do the surgery but they want to put a bag on her to catch her poop and he don't want that He also  and the doctor told him that its is 85%malignant  so that still gives her 15 % they could be wrong. He said he would talk to the family. Electronically signed by Murali Cornell LPN on 5/5/0361 at 9:60 PM    Pt keeps saying pefiorella pefiorella so bladder scanned for 698 and let Dr. Cooper Ireland know after trying Pure wick and bed pan. Gave the ok to straight cath her and if retains again we will keep taylor in. Straight cath for 400cc suzanna urine re bladder scanned and results X's 3 were 479 re cathed and only got 25cc for total of 425 suzanna urine. Rechecked bladder scanned again and showing 450cc . Straight cath removed and notified Dr. Cooper Ireland of results. . No new orders. Electronically signed by Murali Cornell LPN on 9/6/8413 at 0:35 PM

## 2023-01-05 NOTE — ED NOTES
Called lab about lab draws that were sent from prior RN. Lab states specimens were never collected. Lab to come redraw.      Chris Street RN  01/04/23 4437

## 2023-01-05 NOTE — CONSULTS
Ulysses Odor A Koutsaftis  7/12/1932  female  Medical Record Number: [id number]      HPI: 80year old female had presented to my office end of December due to prolonged antibiotics for abdominal abscess. On review of the CT, mass like area with possible malignancy suspected was described. I reviewed her history. She had been on extensive abx dating back to the summer for abscess. She had been on Invanz then switched to Zosyn for abscess. She had an abdominal cx + strep viridans 7/2022 with no subsequent positive cultures ( 8/22 and 11/22. All blood cultures negative. She had seen surgery at [facility] and she was referred to [facility]. She did follow up at [facility] with Dr Victor Manuel Berger but was recommended total colectomy and did not want radical surgery and colostomy. She came to my office after her repeat CT showing increase in the masslike area asking about her antibiotics. I have discontinued her antibiotics and referred her to GI for colonoscopy and biopsy. I also called and discussed in detail her case with IR just day before yesterday - risk of IR guided biopsy outweighed benefit. Her CEA is increased. They recently called my office for follow up appointment but she ended up being admitted to the hospital.     The goal of our last visit together had been to obtain a biopsy of the masslike area to help the family determine direction of medical care. Her son had told me that she was not interested in any radical surgeries and especially did not wish to live with a colostomy. If the biopsy would show evidence of malignancy, then the family would be able to make appropriate decisions per her wishes. She presented to [facility] with weakness and diarrhea. Her temp was 100.5 and she was noted to be tachycardic with procal of 0.63. CT abdomen pelvis in the ED, shows a complex RLQ mass with possible communication with the bowel. Again is noted the 9.1cm cystic lesion in the L adnexa.      She has been empirically started on Ceftriaxone and Azithromycin    UA with small LE  BC pending  GI and surgery on consult. Review of Systems: All 14 review of systems addressed with nurse as patient hard of hearing and are negative other than as stated above      Past Medical History:   Diagnosis Date    Ascending aortic aneurysm 6/23/2017    Charcot Arleen Tooth muscular atrophy     CHF (congestive heart failure) (HCC)     Chronic diastolic CHF (congestive heart failure) ([facility] [address] [address] 4/1/2022    Depression     Descending aortic aneurysm ([facility] [address] 6/23/2017    Essential hypertension 2/16/2018    Headache     HTN (hypertension)     Impaired mobility and activities of daily living     Lumbar stenosis with neurogenic claudication     Myelopathy [facility])     Osteoarthritis        Past Surgical History:   Procedure Laterality Date    COLONOSCOPY N/A 1/4/2023    COLONOSCOPY DIAGNOSTIC performed by Gabriel Mchugh MD at [facility]    IR INS PICC VAD W SQ PORT GREATER THAN 5  9/23/2022    IR INS PICC VAD W SQ PORT GREATER THAN 5 9/23/2022 [facility] SPECIAL PROCEDURE    JOINT REPLACEMENT Bilateral     knees    OTHER SURGICAL HISTORY Right 07/21/2022    cat scan guided abdominal mass aspiration with drain placement by Dr. Nick Curry Left 02/25/2021    LEFT PLEURAL CATHETER INSERTION performed by Garth Lopez MD at [place] [address] Left 01/29/2021    total of 755 cc removed per Dr Emelia Pruett specimen sent to lab       Social History     Socioeconomic History    Marital status:       Spouse name: Not on file    Number of children: 2    Years of education: Not on file    Highest education level: Not on file   Occupational History    Not on file   Tobacco Use    Smoking status: Never    Smokeless tobacco: Never   Vaping Use    Vaping Use: Never used   Substance and Sexual Activity    Alcohol use: No     Alcohol/week: 0.0 standard drinks    Drug use: No    Sexual activity: Not Currently   Other Topics Concern    Not on file   Social History Narrative    , Lives With: Alone, son lives down the street, dtr is in the area    Type of Home: [address]: One level    Home Access: Stairs to enter with rails- Number of Steps: 2- Rails: Both    Bathroom Shower/Tub: Tub/Shower unit, Bathroom Equipment: Grab bars in shower, Shower chair    Home Equipment: Rolling walker, Cane(Pt infrequemtly uses DME for ambulation and prefers to furniture walk in home)    ADL Assistance: Independent, [address]: [facility] Responsibilities: Yes    Ambulation Assistance: Independent, Transfer Assistance: Independent    Additional Comments: Son stops over 2 times daily         Social Determinants of Health     Financial Resource Strain: Not on file   Food Insecurity: Not on file   Transportation Needs: Not on file   Physical Activity: Not on file   Stress: Not on file   Social Connections: Not on file   Intimate Partner Violence: Not on file   Housing Stability: Not on file       Family History   Problem Relation Age of Onset    Arthritis Mother     Arthritis Father     High Blood Pressure Father     Colon Cancer Brother        No current facility-administered medications on file prior to encounter.      Current Outpatient Medications on File Prior to Encounter   Medication Sig Dispense Refill    albuterol sulfate HFA (PROVENTIL;VENTOLIN;PROAIR) 108 (90 Base) MCG/ACT inhaler Inhale 2 puffs into the lungs every 6 hours as needed for Wheezing 18 g 3    digoxin (LANOXIN) 125 MCG tablet Take 1 tablet by mouth daily 30 tablet 3    sacubitril-valsartan (ENTRESTO) 24-26 MG per tablet Take 1 tablet by mouth 2 times daily 60 tablet 1    sucralfate (CARAFATE) 1 GM tablet Take 1 tablet by mouth 4 times daily 120 tablet 3    Lactobacillus TABS Take 2 mg by mouth daily 500 million CFU daily      rivaroxaban (XARELTO) 15 MG TABS tablet Take 1 tablet by mouth daily 90 tablet 3    meclizine (ANTIVERT) 25 MG tablet Take 25 mg by mouth every 6 hours as needed for Dizziness or Nausea      Vitamin D (CHOLECALCIFEROL) 50 MCG (2000 UT) TABS tablet Take 1 tablet by mouth Daily with supper 60 tablet 5    potassium chloride (KLOR-CON M) 20 MEQ extended release tablet Take 20 mEq by mouth 2 times daily Indications: Nutritional Support      furosemide (LASIX) 20 MG tablet TAKE 1 TABLET DAILY 90 tablet 3    [DISCONTINUED] amLODIPine (NORVASC) 5 MG tablet Take 1 tablet by mouth daily 90 tablet 3    pantoprazole (PROTONIX) 40 MG tablet TAKE 1 TABLET DAILY 90 tablet 3    carvedilol (COREG) 6.25 MG tablet TAKE 1 TABLET TWICE A  tablet 3    nitroGLYCERIN (NITROSTAT) 0.4 MG SL tablet up to max of 3 total doses. If no relief after 1 dose, call 911. 25 tablet 3    ferrous sulfate (IRON 325) 325 (65 Fe) MG tablet Take 1 tablet by mouth 2 times daily (with meals) 30 tablet 3    Carboxymethylcellulose Sodium (EYE DROPS OP) Apply 1 drop to eye as needed (Dry eyes) Indications: Systane       Boswellia-Glucosamine-Vit D (OSTEO BI-FLEX ONE PER DAY) TABS Take 1 tablet by mouth daily Indications: Nutritional Support Triple strength      Multiple Vitamins-Minerals (CENTRUM SILVER ULTRA WOMENS) TABS Take 1 tablet by mouth daily Indications: Nutritional Support      vitamin B-12 (CYANOCOBALAMIN) 1000 MCG tablet Take 1,000 mcg by mouth daily Indications: Nutritional Support      citalopram (CELEXA) 20 MG tablet Take 20 mg by mouth daily Indications: Depression      SYNTHROID 88 MCG tablet Take 88 mcg by mouth every morning (before breakfast) Indications: Underactive Thyroid         Physical Exam:    General: Patient appears in no acute distress, resting on L side  Skin: no new rashes   HEENT: Neck is supple  Heart: S1 S2  Lungs: bilaterally equal expansion  Abdomen: soft, ND  Extrem: no new issues. No calf pain  Neuro exam: CN II-XII intact      Labs:    I have reviewed all lab results by electronic record, including most recent CBC, metabolic panel, and pertinent abnormalities were addressed from an infectious disease perspective. WBC trends are being monitored. Lab Results   Component Value Date/Time     01/05/2023 03:48 AM    K 4.1 01/05/2023 03:48 AM    CL 97 01/05/2023 03:48 AM    CO2 22 01/05/2023 03:48 AM    BUN 24 01/05/2023 03:48 AM    CREATININE 0.88 01/05/2023 03:48 AM    GLUCOSE 213 01/05/2023 03:48 AM    CALCIUM 9.3 01/05/2023 03:48 AM      Lab Results   Component Value Date    WBC 17.3 (H) 01/04/2023    HGB 11.2 (L) 01/04/2023    HCT 34.6 (L) 01/04/2023    MCV 83.1 01/04/2023     01/04/2023       Radiology:   I have reviewed imaging results per electronic record and most pertinent abnormalities are being addressed from an infectious disease standpoint. 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 Plan:  I have extensively discussed this case with IR  GI recently did colonoscopy and bx pending in the setting of elevated CEA    She has been re-started on abx due to mass invading into the colon - risk of perforation. Plan to direct therapy soon as she has been on extensive abx. If any watery diarrhea persists, plan on testing C diff today or tomorrow. Meropenem already started. Repeat [facility] tomorrow and plan for 14 days past date of clearance. This was a requested consult  Above is my noted response to the concern that prompted the consult. Communication directed toward referring primary.      Avis Casas D.O.

## 2023-01-05 NOTE — H&P
Hospital Medicine  History and Physical    Patient:  Brandyn Irving  MRN: [id number]    CHIEF COMPLAINT:    Chief Complaint   Patient presents with    Headache     Headache and weakness since colonoscopy this morning. History Obtained From:  patient, electronic medical record  Primary Care Physician: Courtney Garcia MD    HISTORY OF PRESENT ILLNESS:   The patient is a 80 y.o. female who presents with abdominal pain, weakness, and diarrhea following colonoscopy. Pt has a previously noted RLQ mass diagnosed at [facility] in December. Pt was treated with antibiotics to coverage possible abscess with prolonged course of zosyn. She followed up with ID 12/22 and antibiotics were discontinued. During the previous admission, IR was not available for biopsy, and pt was scheduled for outpatient colonoscopy for biopsy. Colonoscopy was performed yesterday with biopsy. No complications were noted and pt was discharged home. Pt has had poor appetite since procedure. Per family pt was too weak to even sit up at home. She was brought to ED for evaluation by family. At baseline pt was still ambulating well at home normal appetite prior to colonoscopy. On arrival to the emergency department T-max 100.5. Respirations 18 pulse 64 blood pressure 107/70 saturating 94% on 4 L O2 on initial evaluation evaluation continue patient developed tachypnea and tachycardia heart rate up to 103 respiratory 24 blood pressure 91/56. Patient was given IV fluids in the ED patient also had a leukocytosis 17.3 with a left shift. Patient is a afebrile she had a potassium of 3.0 and procalcitonin 0.63. CT abdomen pelvis ordered in the ED, official read is still pending however does show a complex right lower quadrant process 15 x 9.6 cm compatible with abscess increased in size from prior visits CT with gas communicating with the bowel.   Possible involved loops of distal ileum as well as incidentally noted left-sided adnexal cysts    Patient has mild pain on palpation of the right lower quadrant she does definitely have a mass and fullness in the area she notes that she has had frequent diarrhea at home but denies any fevers chills or chest pain. Patient is a very difficult historian being both very hard of hearing and having English as a second language most of the history is obtained from the patient's son over the phone      Past Medical History:      Diagnosis Date    Ascending aortic aneurysm 6/23/2017    Charcot Arleen Tooth muscular atrophy     CHF (congestive heart failure) (HCC)     Chronic diastolic CHF (congestive heart failure) ([address] 4/1/2022    Depression     Descending aortic aneurysm ([address] 6/23/2017    Essential hypertension 2/16/2018    Headache     HTN (hypertension)     Impaired mobility and activities of daily living     Lumbar stenosis with neurogenic claudication     Myelopathy ([address]     Osteoarthritis        Past Surgical History:      Procedure Laterality Date    COLONOSCOPY N/A 1/4/2023    COLONOSCOPY DIAGNOSTIC performed by Caitlyn Ayoub MD at [facility]    IR INS PICC VAD W SQ PORT GREATER THAN 5  9/23/2022    IR INS PICC VAD W SQ PORT GREATER THAN 5 9/23/2022 [facility] PROCEDURE    JOINT REPLACEMENT Bilateral     knees    OTHER SURGICAL HISTORY Right 07/21/2022    cat scan guided abdominal mass aspiration with drain placement by Dr. Joleen Zuleta Left 02/25/2021    LEFT PLEURAL CATHETER INSERTION performed by Jaye Judd MD at [place] [id number] Left 01/29/2021    total of 755 cc removed per Dr Theda Mcardle specimen sent to lab       Medications Prior to Admission:    Prior to Admission medications    Medication Sig Start Date End Date Taking?  Authorizing Provider   sodium-potassium-mag sulfate (SUPREP) 17.5-3.13-1.6 GM/177ML SOLN solution As directed  Patient not taking: Reported on 1/4/2023 12/29/22   Caitlyn Ayoub MD   piperacillin-tazobactam (ZOSYN) 40.5 (36-4.5) g injection  11/23/22 Historical Provider, MD   albuterol sulfate HFA (PROVENTIL;VENTOLIN;PROAIR) 108 (90 Base) MCG/ACT inhaler Inhale 2 puffs into the lungs every 6 hours as needed for Wheezing 11/21/22   Lucie Velarde MD   digoxin (LANOXIN) 125 MCG tablet Take 1 tablet by mouth daily  Patient not taking: Reported on 1/4/2023 11/22/22   Lucie Velarde MD   sacubitril-valsartan (ENTRESTO) 24-26 MG per tablet Take 1 tablet by mouth 2 times daily 11/22/22   Lucie Velarde MD   sucralfate (CARAFATE) 1 GM tablet Take 1 tablet by mouth 4 times daily  Patient not taking: Reported on 1/4/2023 11/21/22   Lucie Velarde MD   Lactobacillus TABS Take by mouth daily    Historical Provider, MD   rivaroxaban Ra Sneed) 15 MG TABS tablet Take 1 tablet by mouth daily 9/29/22   ADRIENNE Sotomayor CNP   meclizine (ANTIVERT) 25 MG tablet Take by mouth daily 9/12/22   Historical Provider, MD   Vitamin D (CHOLECALCIFEROL) 50 MCG (2000 UT) TABS tablet Take 1 tablet by mouth Daily with supper  Patient taking differently: Take 2,000 Units by mouth Daily with supper Indications: Nutritional Support 8/18/22   Kevin Farrell, DO   potassium chloride (KLOR-CON M) 20 MEQ extended release tablet Take 20 mEq by mouth 2 times daily Indications: Nutritional Support    Historical Provider, MD   furosemide (LASIX) 20 MG tablet TAKE 1 TABLET DAILY  Patient taking differently: Take 20 mg by mouth daily Indications: Congestive Heart Failure 4/4/22   ADRIENNE Sotomayor CNP   amLODIPine (NORVASC) 5 MG tablet Take 1 tablet by mouth daily 4/1/22 8/17/22  Jorge Franco, DO   pantoprazole (PROTONIX) 40 MG tablet TAKE 1 TABLET DAILY  Patient taking differently: Take 40 mg by mouth daily Indications: Ulcer 2/11/22   ADRIENNE Sotomayor CNP   carvedilol (COREG) 6.25 MG tablet TAKE 1 TABLET TWICE A DAY  Patient taking differently: Take 6.25 mg by mouth 2 times daily Indications: High Blood Pressure Disorder 1/17/22   Ann Hernandez ADRIENNE Rosas - CNP   nitroGLYCERIN (NITROSTAT) 0.4 MG SL tablet up to max of 3 total doses. If no relief after 1 dose, call 911. Patient taking differently: Place 0.4 mg under the tongue every 5 minutes as needed Indications: Chest Pain up to max of 3 total doses. If no relief after 1 dose, call 911. 3/1/21   Woo Key APRN - NP   ferrous sulfate (IRON 325) 325 (65 Fe) MG tablet Take 1 tablet by mouth 2 times daily (with meals)  Patient taking differently: Take 325 mg by mouth 2 times daily (with meals) Indications: Anemia 3/1/21   Woo Key APRN - NP   Carboxymethylcellulose Sodium (EYE DROPS OP) Apply 1 drop to eye as needed (Dry eyes) Indications: Systane     Historical Provider, MD   Boswellia-Glucosamine-Vit D (OSTEO BI-FLEX ONE PER DAY) TABS Take 1 tablet by mouth daily Indications: Nutritional Support    Historical Provider, MD   Multiple Vitamins-Minerals (CENTRUM SILVER ULTRA WOMENS) TABS Take 1 tablet by mouth daily Indications: Nutritional Support    Historical Provider, MD   vitamin B-12 (CYANOCOBALAMIN) 1000 MCG tablet Take 1,000 mcg by mouth daily Indications: Nutritional Support    Historical Provider, MD   citalopram (CELEXA) 20 MG tablet Take 20 mg by mouth daily Indications: Depression 4/1/16   Historical Provider, MD   SYNTHROID 88 MCG tablet Take 88 mcg by mouth daily Indications: Underactive Thyroid 3/19/16   Historical Provider, MD       Allergies:  Latex and Tape [adhesive tape]    Social History:   TOBACCO:   reports that she has never smoked. She has never used smokeless tobacco.  ETOH:   reports no history of alcohol use. OCCUPATION:  none    Family History:       Problem Relation Age of Onset    Arthritis Mother     Arthritis Father     High Blood Pressure Father     Colon Cancer Brother        REVIEW OF SYSTEMS:  Ten systems reviewed and negative except for as above.      Physical Exam:    Vitals: /68   Pulse 90   Temp 97.5 °F (36.4 °C) (Oral)   Resp 18   Ht 5' (1.524 m)   Wt 150 lb (68 kg)   SpO2 95%   BMI 29.29 kg/m²   Constitutional: alert, appears stated age and cooperative  Skin: Skin color, texture, turgor normal. No rashes or lesions  Eyes:Eye: Normal external eye, conjunctiva, ORIN. ENT: Head: Normocephalic, no lesions, without obvious abnormality. Neck: no adenopathy, no carotid bruit, no JVD, supple, symmetrical, trachea midline and thyroid not enlarged, symmetric, no tenderness/mass/nodules  Respiratory: clear to auscultation bilaterally few basilar rales left lung base  Cardiovascular: regular rate and rhythm, systolic ejection murmur  Gastrointestinal: Large right lower quadrant mass with fullness tenderness to palpation dullness to percussion  Genitourinary: Deferred  Musculoskeletal:extremities normal, atraumatic, no cyanosis or edema  Neurologic: Mental status AAOx3 No facial asymmetry or droop. Normal muscle strength b/l. Psychiatric: Appropriate mood and affect. Good insight and judgement  Hematologic: No obvious bruising or bleeding    Recent Labs     01/04/23 2108   WBC 17.3*   HGB 11.2*        Recent Labs     01/04/23 2108      K 3.0*   CL 97   CO2 21   BUN 20   CREATININE 0.82   GLUCOSE 152*   AST 28   ALT 14   BILITOT 0.8*   ALKPHOS 53     ABGs:   Lab Results   Component Value Date/Time    PHART 7.421 11/11/2022 12:32 PM    PO2ART 84 11/11/2022 12:32 PM    MMI1RRC 43 11/11/2022 12:32 PM     INR: No results for input(s): INR in the last 72 hours.   URINALYSIS:  Recent Labs     01/04/23  2214   COLORU DARK YELLOW*   PHUR 5.0   WBCUA 0-2   RBCUA 3-5*   BACTERIA Negative   CLARITYU CLOUDY*   SPECGRAV 1.022   LEUKOCYTESUR TRACE*   UROBILINOGEN 0.2   BILIRUBINUR SMALL*   BLOODU TRACE*   GLUCOSEU Negative     -----------------------------------------------------------------   CT HEAD WO CONTRAST    Result Date: 1/4/2023  EXAMINATION: CT OF THE HEAD WITHOUT CONTRAST  1/4/2023 7:27 pm TECHNIQUE: CT of the head was performed without the administration of intravenous contrast. Automated exposure control, iterative reconstruction, and/or weight based adjustment of the mA/kV was utilized to reduce the radiation dose to as low as reasonably achievable. COMPARISON: None. 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 ->No Decision Support Exception - unselect if not a suspected or confirmed emergency medical condition->Emergency Medical Condition (MA) What reading provider will be dictating this exam?->CRC FINDINGS: BRAIN/VENTRICLES: There is no acute intracranial hemorrhage, mass effect or midline shift. No abnormal extra-axial fluid collection. The gray-white differentiation is maintained without evidence of an acute infarct. There is prominence of the ventricles and sulci due to global parenchymal volume loss. There are nonspecific areas of hypoattenuation within the periventricular and subcortical white matter, which likely represent chronic microvascular ischemic change. ORBITS: The visualized portion of the orbits demonstrate no acute abnormality. SINUSES: The visualized paranasal sinuses and mastoid air cells demonstrate no acute abnormality. There is mild mucosal thickening in the maxillary sinuses. SOFT TISSUES/SKULL: No acute abnormality of the visualized skull or soft tissues. No acute intracranial abnormality. XR CHEST PORTABLE    Result Date: 1/4/2023  EXAMINATION: ONE XRAY VIEW OF THE CHEST 1/4/2023 7:16 pm COMPARISON: None. HISTORY: ORDERING SYSTEM PROVIDED HISTORY: fever TECHNOLOGIST PROVIDED HISTORY: Reason for exam:->fever What reading provider will be dictating this exam?->CRC FINDINGS: There is cardiomegaly. There is vascular congestion. There is tortuous dilated aortic arch and descending thoracic aorta.   There is patchy infiltrates and pleural effusion in the left lung base. Cardiomegaly with the dilated aortic arch and descending thoracic aorta. Consider CT for better assessment. Patchy left basilar infiltrate and effusion likely pneumonia. Colonoscopy    Result Date: 1/4/2023  Site: [address] [place] [address] Patient Name: Anette Alex Procedure Date: 1/4/2023 MRN: [id number] YOB: 1932 Gender: Female Attending MD: Lester Casillas Indications:        -  Abnormal CT of the GI tract        - high CEA. Medications:        -  See the Anesthesia note for documentation of the administered medications Complications:        -  No immediate complications. Estimated Blood Loss:        -  Estimated blood loss was minimal. Procedure:        - The Colonoscope was introduced through the anus and advanced to the cecum,           identified by appendiceal orifice and ileocecal valve. -  The colonoscopy was performed without difficulty. -  The patient tolerated the procedure well. -  The quality of the bowel preparation was good. -  The ileocecal valve, appendiceal orifice, and rectum were photographed. Findings:        -  A 12 mm polyp was found in the rectum. The polyp was pedunculated. The           polyp was removed with a hot snare. Resection and retrieval were complete. -  A 10 mm polyp was found in the descending colon. The polyp was sessile. The polyp was removed with a cold snare. Resection and retrieval were           complete. -  A 7 mm polyp was found in the hepatic flexure. The polyp was sessile. The           polyp was removed with a cold snare. Resection and retrieval were complete. -  A frond-like/villous, fungating, infiltrative, friable and ulcerated           partially obstructing large mass was found in the ascending colon. The mass           was partially circumferential (involving two-thirds of the lumen           circumference).   The mass measured 6 cm (in length). This mass was extending           into the hepatic flexure. No spontaneous bleeding was present. Biopsies were           taken with a cold forceps for histology. -  A frond-like/villous, ulcerated, infiltrative ,friable  and polypoid           non-obstructing large mass was found in the cecum. The mass was partially           circumferential (involving one-half of the lumen circumference). No bleeding           was present. Biopsies were taken with a cold forceps for histology. Impression:        -  One 12 mm polyp in the rectum, removed with a hot snare. Resected and           retrieved. -  One 10 mm polyp in the descending colon, removed with a cold snare. Resected and retrieved. -  One 7 mm polyp at the hepatic flexure, removed with a cold snare. Resected           and retrieved. 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 Biopsied.        -  Likely malignant tumor in the cecum. Biopsied. Recommendation:        -  Return to my office as previously scheduled. - surgical consult after discussing with patient and family. Procedure Code(s):        - [id number], Colonoscopy, flexible; with removal of tumor(s), polyp(s), or other           lesion(s) by snare technique        - 63439-50, Colonoscopy, flexible; with biopsy, single or multiple Diagnosis Code(s):        - R93.3, Abnormal findings on diagnostic imaging of other parts of digestive           tract        - D12.8, Benign neoplasm of rectum        - D12.4, Benign neoplasm of descending colon        - D12.3, Benign neoplasm of transverse colon (hepatic flexure or splenic           flexure)        - D49.0, Neoplasm of unspecified behavior of digestive system        - K56.690, Other partial intestinal obstruction       CPT(R) - 2022 copyright American Medical Association. All Rights Reserved.        The CPT codes, CCI edits and ICD codes generated are intended as suggestions       and were generated based on input data. These codes are preliminary and upon        review may be revised to meet current compliance and payer requirements. The provider is responsible for the final determination of appropriate codes,       and modifiers. Scope Withdrawal Time:       00:15:21 Signature Name: Luis Felipe Bowman MD Signature Statement: This document has been electronically signed. Note Initiated On:1/4/2023 Signature Date:1/4/2023 12:30 PM      EKG: EKG ordered, pending    Assessment and Plan   Large right lower quadrant fungating mass: Concern for malignancy with metastasis into an invasion into the ileum. Air movement area of the mass is likely secondary to colonoscopy however given progression from previous CT will again empirically cover with meropenem/Vanco given leukocytosis and fevers and fatigue for sepsis coverage. Consult infectious disease for antibiotic assistance with antibiotic de-escalation, consult to GI and general surgery for further recommendations regarding possible biopsy if necessary. N.p.o. until further recommendations regarding biopsy. Hypertension: Holding oral agents. Hydralazine if necessary   Atrial fibrillation: Holding any anticoagulation currently until a determination is made about any possible procedures or biopsy  GERD: Protonix IV while n.p.o. Hypothyroid disease: Resume Synthroid if n.p.o. 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 A    Thoracic aortic aneurysm without rupture I71.20    Descending aortic aneurysm ([facility]) I71.9    Vertigo R42    Shortness of breath R06.02    Essential hypertension I10    Acute on chronic combined systolic and diastolic CHF (congestive heart failure) ([facility]) I50.43    Gait abnormality R26.9    A-fib ([facility]) I48.91    Pleural effusion J90    Hypoxia R09.02    Acute pulmonary edema ([facility]) J81.0    Acute on chronic combined systolic (congestive) and diastolic (congestive) heart failure ([facility]) I50.43    Acute cystitis with hematuria N30.01    Chronic diastolic CHF (congestive heart failure) ([facility]) I50.32    Right lower quadrant abdominal pain R10.31    Hyponatremia E87.1    Hypokalemia E87.6    Anemia D64.9    CHF (congestive heart failure) ([facility]) I50.9    Hypothyroid E03.9    Colonic mass K63.89    Central retinal vein occlusion, left eye, stable H34.8122    Hx of drainage of abscess Z98.890    Abdominal pain R10.9    Streptococcus viridans infection A49.1    Aortic thrombus ([facility]) I74.10    Intra-abdominal abscess ([facility]) K65.1    Adnexal mass N94.89    Change or removal of drains Z48.03    Difficulty in walking R26.2    Muscle weakness (generalized) K08.09    Acute diastolic HF (heart failure) ([facility]) I50.31    CHF (congestive heart failure), NYHA class I, acute on chronic, combined ([facility]) I50.43    Gastro-esophageal reflux disease without esophagitis K21.9    Intracardiac thrombosis, not elsewhere classified I51.3    Impaired mobility and ADLs Z74.09, Z78.9    Palliative care encounter Z51.5    Advanced care planning/counseling discussion Z71.89    Goals of care, counseling/discussion Z71.89    Adenomatous polyp of descending colon D12.4    Adenomatous polyp of colon D12.6    Weakness R53.1       Evaristo Valdez DO  Admitting Hospitalist    Emergency Contact:

## 2023-01-05 NOTE — ED NOTES
Report called to RN taking over care of pt going to room W269. No acute distress noted. Resps even non labored. Skin p/w/d. Telepack applied.      Mae Bennett RN  01/05/23 3636

## 2023-01-05 NOTE — CARE COORDINATION
[facility] [place] Case Management Initial Discharge Assessment    Met with Family to discuss discharge plan. PCP: Savita Gage MD                                Date of Last Visit: 2.5 WEEKS AGO    [facility] Patient: No        [facility] Notified: no    If no PCP, list provided? N/A    Discharge Planning    Living Arrangements: independently at home    Who do you live with? ALONE. SON LIVES SEVERAL HOUSES DOWNS AND CHECKS ON HER THROUGHOUT THE DAY. THEY PROVIDE MEALS AND DOES LAUNDRY. Who helps you with your care:  self. AND SON     If lives at home:     Do you have any barriers navigating in your home? yes - 2 STEPS. HAS RAILING     Patient can perform ADL? Yes    Current Services (outpatient and in home) : Outpatient PT/OT (Company JUST COMPLETED OP PT. HAD [address] WHEN PT HAD PICC LINE LAST ADMISSION)    Dialysis: No    Is transportation available to get to your appointments? Yes. SON     DME Equipment:  yes - COUPLE OF WALKERS, CANE    Respiratory equipment: Continuous Oxygen  2 Liters     Respiratory provider:  yes - UNSURE WHO HER RESPIRATORY PROVIDER IS     Pharmacy:  yes - [facility]     Consult with Medication Assistance Program?  No      Patient agreeable to [address]? HAD [address]. TBD IF AGREEABLE TO [facility]. SON SAID IF SHE CAN STAND UP, THEY WANT HER TO RETURN HOME. Patient agreeable to SNF/Rehab? TBD PENDING PROGRESS. CM TO ASSESS    Other discharge needs identified? Palliative Care    Does Patient Have a High-Risk for Readmission Diagnosis (CHF, PN, MI, COPD)? No    If Yes,    Consult with pulmonologist? Yes  Consult with cardiologist? Yes  Cardiac Rehab referral if EF <35%? Yes  Consult with Pharmacy for medication assessment prior to discharge? Yes  Consult with Behavioral health to aid in depression, anxiety, or coping issues? N/A  Palliative Care Consult? Yes  Pulmonary Rehab order for COPD, PN, and CHF (if EF > 35%)?  Yes   Does patient have a reliable scale and know how to read it (for CHF)? N/A  Nutrition consult for CHF? Yes  Respiratory therapy consult that includes bedside instruction on administration of nebulizers and/or inhalers, and assessment of oxygen and equipment needs in the home? Yes    Initial Discharge Plan? SPOKE W/SON TO ASSESS NEEDS AND DISCUSS DISCHARGE PLAN WHICH IS TBD PENDING PROGRESS. PER SON, INCREASED WEAKNESS, NOT EATING OR DRINKING. PALLIATIVE CARE REFERRAL ORDERED. CM/LSW TO FOLLOW AND ASSESS NEEDS. (Note: please see concurrent daily documentation for any updates after initial note).         Readmission Risk              Risk of Unplanned Readmission:  33         Electronically signed by Miguel Giraldo RN on 1/5/2023 at 3:01 PM

## 2023-01-05 NOTE — ED NOTES
Patient off monitor and to OR #2 via bed, accompanied by ,, and writer. X-ray reading done by myself does show a left lower lobe infiltrate with associated pleural effusion. Given patient's leukocytosis, fever and infection she was started on IV antibiotics and call was placed to hospitalist to discuss admission for further evaluation and care    ICD-10-CM    1. Pneumonia of left lower lobe due to infectious organism  J18.9       2. Generalized weakness  R53.1       3. Dehydration  E86.0       4.  Acute nonintractable headache, unspecified headache type  R51.9              Justin Wang PA-C  01/05/23 0031

## 2023-01-05 NOTE — CONSULTS
Inpatient consult to GI  Consult performed by: Cielo Moyer MD  Consult ordered by: Diogenes Scott DO    Patient Name: Karlie Ennis Date: 2023  6:18 PM  MR #: [id number]  : 1932    Attending Physician: Luis Harrison DO  Reason for consult: Large colon mass  History Obtained From:  patient, electronic medical record, staff nurse  History of Presenting Illness:      Joanne Ryan is a 80 y.o. female on hospital day 0 with PMH of Ascending aortic aneurysm, Charcot Arleen Tooth muscular atrophy, CHF (congestive heart failure) ([address] Chronic diastolic CHF (congestive heart failure) ([address] Depression, Descending aortic aneurysm ([address] Essential hypertension, Headache, HTN (hypertension), Impaired mobility and activities of daily living, Lumbar stenosis with neurogenic claudication, Myelopathy ([address] and Osteoarthritis. PSH includes joint replacement (Bilateral); thoracentesis (Left, 2021); Pleural Cath Insertion (Left, 2021); other surgical history (Right, 2022); IR INSERT PICC VAD W SQ PORT >5 YEARS (2022); and Colonoscopy (N/A, 2023). Admitted with history of severe weakness and diarrhea following colonoscopy. Noted to have RLQ abscess since July, found to be a mass in 2022 hospitalization. Treated with prolonged course of Zosyn for possible abscess. Colonoscopy with biopsy performed 2023 with Dr. Ranjana Alvarez. No complications postop and patient was discharged home. At home, patient with generalized weakness, unable to stand up so family brought her to the ER. Noted mild fever of 100.5, 94% on 4 L O2. Mild tachycardia with heart rate up to 103. Noted leukocytosis, WBCs 17.3 with left shift. Procalcitonin 0.63. Of note, she was previously referred to [facility], did not follow-up with [facility] as she was told she would possibly have to have complete colectomy and did not want colostomy bag.   CT abdomen pelvis with IV contrast from prior admission on 12/6/2022: Mixed attenuation soft tissue mass seen in the right flank and lower quadrant adjacent to the splenic flexure concerning for an underlying soft tissue mass with necrosis concerning for malignancy. There is some surrounding stranding and minimal fluid in the right flank and inferior to the liver. Exophytic mass and malignancy of the colon is of concern. This area is increased in size when compared to the prior study. CT abdomen pelvis with IV contrast today: Slight increase seen in size of the complex process in the right lower quadrant with new presence of gas indicating communication with the bowel. The process appears to be centered around the cecum with involvement as well seen in the ascending colon and several distal small bowel loops. Findings concerning for abscess or necrotic mass with possible superimposed infection. Previous endoscopic history:  Colonoscopy 1/4/2023 Dr. Erik Lucero: One 12 mm polyp in the rectum, removed with a hot snare. Resected and retrieved. One 10 mm polyp in the descending colon, removed with a cold snare. Resected and retrieved. One 7 mm polyp at the hepatic flexure, removed with a cold snare. Resected and retrieved. Likely malignant partially obstructing tumor in the ascending colon. Biopsied. Likely malignant tumor in the cecum. Biopsied.     History:      Past Medical History:   Diagnosis Date    Ascending aortic aneurysm 6/23/2017    Charcot Arleen Tooth muscular atrophy     CHF (congestive heart failure) (HCC)     Chronic diastolic CHF (congestive heart failure) ([facility] 4/1/2022    Depression     Descending aortic aneurysm ([facility] 6/23/2017    Essential hypertension 2/16/2018    Headache     HTN (hypertension)     Impaired mobility and activities of daily living     Lumbar stenosis with neurogenic claudication     Myelopathy [facility])     Osteoarthritis      Past Surgical History:   Procedure Laterality Date    COLONOSCOPY N/A 1/4/2023    COLONOSCOPY DIAGNOSTIC performed by Kristin Dos Santos MD at [facility]    IR INS PICC VAD W SQ PORT GREATER THAN 5  9/23/2022    IR INS PICC VAD W SQ PORT GREATER THAN 5 9/23/2022 [facility]ED[facility]    JOINT REPLACEMENT Bilateral     knees    OTHER SURGICAL HISTORY Right 07/21/2022    cat scan guided abdominal mass aspiration with drain placement by Dr. Lia Husain Left 02/25/2021    LEFT PLEURAL CATHETER INSERTION performed by Jazmyne Null MD at [place] [address] Left 01/29/2021    total of 755 cc removed per Dr Kristofer Johnson specimen sent to lab     Family History  Family History   Problem Relation Age of Onset    Arthritis Mother     Arthritis Father     High Blood Pressure Father     Colon Cancer Brother      [] Unable to obtain due to ventilated and/ or neurologic status  Social History     Socioeconomic History    Marital status:       Spouse name: Not on file    Number of children: 2    Years of education: Not on file    Highest education level: Not on file   Occupational History    Not on file   Tobacco Use    Smoking status: Never    Smokeless tobacco: Never   Vaping Use    Vaping Use: Never used   Substance and Sexual Activity    Alcohol use: No     Alcohol/week: 0.0 standard drinks    Drug use: No    Sexual activity: Not Currently   Other Topics Concern    Not on file   Social History Narrative    , Lives With: Alone, son lives down the street, dtr is in the area    Type of Home: [address]: One level    Home Access: Stairs to enter with rails- Number of Steps: 2- Rails: Both    Bathroom Shower/Tub: Tub/Shower unit, Bathroom Equipment: Grab bars in shower, Shower chair    Home Equipment: Rolling walker, Cane(Pt infrequemtly uses DME for ambulation and prefers to furniture walk in home)    ADL Assistance: Independent, Homemaking Assistance: Independent    Homemaking Responsibilities: Yes    Ambulation Assistance: Independent, Transfer Assistance: Independent    Additional Comments: Son stops over 2 times daily         Social Determinants of Health     Financial Resource Strain: Not on file   Food Insecurity: Not on file   Transportation Needs: Not on file   Physical Activity: Not on file   Stress: Not on file   Social Connections: Not on file   Intimate Partner Violence: Not on file   Housing Stability: Not on file      [] Unable to obtain due to ventilated and/ or neurologic status  Home Medications:   Medications Prior to Admission: sodium-potassium-mag sulfate (SUPREP) 17.5-3.13-1.6 GM/177ML SOLN solution, As directed (Patient not taking: Reported on 1/4/2023)  piperacillin-tazobactam (ZOSYN) 40.5 (36-4.5) g injection,   albuterol sulfate HFA (PROVENTIL;VENTOLIN;PROAIR) 108 (90 Base) MCG/ACT inhaler, Inhale 2 puffs into the lungs every 6 hours as needed for Wheezing  digoxin (LANOXIN) 125 MCG tablet, Take 1 tablet by mouth daily (Patient not taking: Reported on 1/4/2023)  sacubitril-valsartan (ENTRESTO) 24-26 MG per tablet, Take 1 tablet by mouth 2 times daily  sucralfate (CARAFATE) 1 GM tablet, Take 1 tablet by mouth 4 times daily (Patient not taking: Reported on 1/4/2023)  Lactobacillus TABS, Take by mouth daily  rivaroxaban (XARELTO) 15 MG TABS tablet, Take 1 tablet by mouth daily  meclizine (ANTIVERT) 25 MG tablet, Take by mouth daily  Vitamin D (CHOLECALCIFEROL) 50 MCG (2000 UT) TABS tablet, Take 1 tablet by mouth Daily with supper (Patient taking differently: Take 2,000 Units by mouth Daily with supper Indications: Nutritional Support)  potassium chloride (KLOR-CON M) 20 MEQ extended release tablet, Take 20 mEq by mouth 2 times daily Indications: Nutritional Support  furosemide (LASIX) 20 MG tablet, TAKE 1 TABLET DAILY (Patient taking differently: Take 20 mg by mouth daily Indications: Congestive Heart Failure)  pantoprazole (PROTONIX) 40 MG tablet, TAKE 1 TABLET DAILY (Patient taking differently: Take 40 mg by mouth daily Indications: Ulcer)  carvedilol (COREG) 6.25 MG tablet, TAKE 1 TABLET TWICE A DAY (Patient taking differently: Take 6.25 mg by mouth 2 times daily Indications: High Blood Pressure Disorder)  nitroGLYCERIN (NITROSTAT) 0.4 MG SL tablet, up to max of 3 total doses. If no relief after 1 dose, call 911. (Patient taking differently: Place 0.4 mg under the tongue every 5 minutes as needed Indications: Chest Pain up to max of 3 total doses. If no relief after 1 dose, call 911.)  ferrous sulfate (IRON 325) 325 (65 Fe) MG tablet, Take 1 tablet by mouth 2 times daily (with meals) (Patient taking differently: Take 325 mg by mouth 2 times daily (with meals) Indications: Anemia)  Carboxymethylcellulose Sodium (EYE DROPS OP), Apply 1 drop to eye as needed (Dry eyes) Indications: Systane   Boswellia-Glucosamine-Vit D (OSTEO BI-FLEX ONE PER DAY) TABS, Take 1 tablet by mouth daily Indications: Nutritional Support  Multiple Vitamins-Minerals (CENTRUM SILVER ULTRA WOMENS) TABS, Take 1 tablet by mouth daily Indications: Nutritional Support  vitamin B-12 (CYANOCOBALAMIN) 1000 MCG tablet, Take 1,000 mcg by mouth daily Indications: Nutritional Support  citalopram (CELEXA) 20 MG tablet, Take 20 mg by mouth daily Indications: Depression  SYNTHROID 88 MCG tablet, Take 88 mcg by mouth daily Indications: Underactive Thyroid  Current Hospital Medications:   Scheduled Meds:   meropenem  1,000 mg IntraVENous Q8H    vancomycin  1,000 mg IntraVENous Q24H    pantoprazole (PROTONIX) 40 mg injection  40 mg IntraVENous Daily     Continuous Infusions:  PRN Meds:. Allergies: Allergies   Allergen Reactions    Latex     Tape [Adhesive Tape]       Review of Systems:    [x] CV, Resp, Neuro, , and all other systems reviewed and negative other than listed in HPI.     Objective Findings:     Vitals:   Vitals:    01/04/23 2330 01/05/23 0000 01/05/23 0037 01/05/23 0144   BP: 98/62 104/87 91/65 108/68   Pulse: (!) 101 90 93 90   Resp: 18 25 17 18   Temp:    97.5 °F (36.4 °C)   TempSrc:    Oral   SpO2: 95% 94% 92% 95%   Weight:       Height:          Physical Examination:  General: Alert, in no acute distress  HEENT: Normocephalic, no scleral icterus. Neck: soft/supple  Heart: Regular, no murmur, no rub/gallop. Lungs: Clear to ascultation, no rales/wheezing/rhonchi. Equal chest wall excursion. Abdomen: Abdomen soft, non-tender. BS normal. +Right abdominal mass  Extremities: no clubbing/cyanosis, no edema. Skin: Warm, dry, normal turgor, no rash, no bruise, no petichiae. Neuro: without focal deficit, moves all extremities   Psych: normal affect    Results/ Medications reviewed 1/5/2023, 8:19 AM     Laboratory, Microbiology, Pathology, Radiology, Cardiology, Medications and Transcriptions reviewed  Scheduled Meds:   meropenem  1,000 mg IntraVENous Q8H    vancomycin  1,000 mg IntraVENous Q24H    pantoprazole (PROTONIX) 40 mg injection  40 mg IntraVENous Daily     Continuous Infusions:    Recent Labs     01/04/23 2108   WBC 17.3*   HGB 11.2*   HCT 34.6*   MCV 83.1        Recent Labs     01/04/23 2108 01/05/23  0348    134*   K 3.0* 4.1   CL 97 97   CO2 21 22   BUN 20 24*   CREATININE 0.82 0.88     Recent Labs     01/04/23 2108 01/05/23  0348   AST 28 28   ALT 14 14   BILIDIR  --  <0.2   BILITOT 0.8* 0.6   ALKPHOS 53 52     No results for input(s): LIPASE, AMYLASE in the last 72 hours. Recent Labs     01/04/23 2108 01/05/23  0348   PROT 6.6 6.5     CT HEAD WO CONTRAST    Result Date: 1/4/2023  EXAMINATION: CT OF THE HEAD WITHOUT CONTRAST  1/4/2023 7:27 pm TECHNIQUE: CT of the head was performed without the administration of intravenous contrast. Automated exposure control, iterative reconstruction, and/or weight based adjustment of the mA/kV was utilized to reduce the radiation dose to as low as reasonably achievable. COMPARISON: None.  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 ->No Decision Support Exception - unselect if not a suspected or confirmed emergency medical condition->Emergency Medical Condition (MA) What reading provider will be dictating this exam?->CRC FINDINGS: BRAIN/VENTRICLES: There is no acute intracranial hemorrhage, mass effect or midline shift. No abnormal extra-axial fluid collection. The gray-white differentiation is maintained without evidence of an acute infarct. There is prominence of the ventricles and sulci due to global parenchymal volume loss. There are nonspecific areas of hypoattenuation within the periventricular and subcortical white matter, which likely represent chronic microvascular ischemic change. ORBITS: The visualized portion of the orbits demonstrate no acute abnormality. SINUSES: The visualized paranasal sinuses and mastoid air cells demonstrate no acute abnormality. There is mild mucosal thickening in the maxillary sinuses. SOFT TISSUES/SKULL: No acute abnormality of the visualized skull or soft tissues. No acute intracranial abnormality. CT ABDOMEN PELVIS W IV CONTRAST Additional Contrast? Radiologist Recommendation    Result Date: 12/6/2022  EXAMINATION: CT OF THE ABDOMEN AND PELVIS WITH CONTRAST 12/6/2022 3:42 pm TECHNIQUE: CT of the abdomen and pelvis was performed with the administration of intravenous contrast. Multiplanar reformatted images are provided for review. Automated exposure control, iterative reconstruction, and/or weight based adjustment of the mA/kV was utilized to reduce the radiation dose to as low as reasonably achievable.  COMPARISON: 10/19/2022 HISTORY: ORDERING SYSTEM PROVIDED HISTORY: Intra-abdominal abscess [facility]) TECHNOLOGIST PROVIDED HISTORY: Additional Contrast?->Radiologist Recommendation STAT Creatinine as needed:->No What reading provider will be dictating this exam?->CRC FINDINGS: Lower Chest: There is ectasia identified of the descending thoracic aorta largest AP dimension measures 4.3 cm. Some atelectatic changes seen at the left lung base with trace effusion. Organs: The liver is homogeneous in appearance. No stones in the gallbladder. The spleen is unremarkable. Pancreas is homogeneous. Both adrenal glands are within normal limits. Symmetric enhancement of the renal parenchyma. No stones or distension seen in the renal collecting system. GI/Bowel: Stomach is unremarkable. No wall thickening. The small bowel is within normal limits. No mucosal abnormality. Stool seen scattered diffusely throughout the colon. There is an abnormal mass identified both solid and low in attenuation to suggest a large in a chronic malignancy measuring 12.8 x 8.3 cm. This is increased in size when compared to the prior examination where this area measured 6.6 x 9.4 cm. The mass lies adjacent to the inferior aspect of the splenic flexure. A colonic malignancy is of concern or possibly an appendiceal malignancy. The right ovary appears to be unremarkable in separate from this area of abnormality. Pelvis: There is a large cystic structure identified within the left adnexa which is stable and unchanged compared to the prior examination measuring 9.0 x 6.5 cm. Ultrasound follow-up is recommended of this left adnexal cyst. The bladder is unremarkable. Uterus is unremarkable. Peritoneum/Retroperitoneum: There is no abdominal or retroperitoneal lymphadenopathy. No pelvic adenopathy. Atherosclerotic disease diffusely throughout the abdominal aorta and iliac vessels. Bones/Soft Tissues: Bony structures reveal degenerative changes seen within the spine and pelvis. There is no ventral abdominal wall mass. Small right inguinal hernia containing fat only.      Mixed attenuation soft tissue mass seen in the right flank and lower quadrant adjacent to the splenic flexure concerning for an underlying soft tissue mass with necrosis concerning for malignancy. There is some surrounding stranding and minimal fluid in the right flank and inferior to the liver. Exophytic mass and malignancy of the colon is of concern. This area is increased in size when compared to the prior study. Stable large cystic structure in the left adnexa in which follow-up ultrasound is recommended for further characterization given the size. IR FLUORO GUIDED CVA DEVICE REPLACEMENT    Result Date: 12/7/2022  EXAMINATION: IR FLUORO GUIDED CVA DEVICE REPLACEMENT DATE AND TIME:12/7/2022 9:15 AM CLINICAL HISTORY: K65.1 Intra-abdominal abscess (HCC) ICD10 COMPARISON: None available. Radiation dose: 18.88 mGy. After discussing the procedure and possible complications with the patient, informed consent was obtained. The patient was placed on the Special Procedures table. The right upper extremity was sterilely prepared using 2% chlorhexidine including the existing PICC line and then draped with sterile towels and a body-sized sterile drape. All personnel in the Special Procedures Room donned caps and surgical masks. In addition, the  and first assistant donned sterile gowns and gloves after proper hand cleansing. A pre-procedure time out was performed in order to assure the correct patient and procedure. Local anesthetic was administered. A guidewire was advanced through the existing PICC line into the vein with fluoroscopic guidance, the old PICC line was removed and a sheath was placed over the guidewire. A new 5-Singaporean dual-lumen PICC was advanced through the sheath, into the brachial vein, up the arm and into the central vasculature. It was positioned appropriately. The sheath was removed. The catheter was shown to aspirate and infuse properly. The flange of the catheter was affixed to the arm using a PICC securement device. A spot image of the chest showed the tip of the PICC line to lie in the right atrium.   The patient tolerated the procedure well and without complications. CONCLUSION: SUCCESSFUL PICC EXCHANGE WITHOUT IMMEDIATE COMPLICATIONS. XR CHEST PORTABLE    Result Date: 1/4/2023  EXAMINATION: ONE XRAY VIEW OF THE CHEST 1/4/2023 7:16 pm COMPARISON: None. HISTORY: ORDERING SYSTEM PROVIDED HISTORY: fever TECHNOLOGIST PROVIDED HISTORY: Reason for exam:->fever What reading provider will be dictating this exam?->CRC FINDINGS: There is cardiomegaly. There is vascular congestion. There is tortuous dilated aortic arch and descending thoracic aorta. There is patchy infiltrates and pleural effusion in the left lung base. Cardiomegaly with the dilated aortic arch and descending thoracic aorta. Consider CT for better assessment. Patchy left basilar infiltrate and effusion likely pneumonia. Colonoscopy    Result Date: 1/4/2023  Site: [address] [place] [address] Patient Name: Sagar Coburn Procedure Date: 1/4/2023 MRN: [id number] YOB: 1932 Gender: Female Attending MD: Stefano Olivares Indications:        -  Abnormal CT of the GI tract        - high CEA. Medications:        -  See the Anesthesia note for documentation of the administered medications Complications:        -  No immediate complications. Estimated Blood Loss:        -  Estimated blood loss was minimal. Procedure:        - The Colonoscope was introduced through the anus and advanced to the cecum,           identified by appendiceal orifice and ileocecal valve. -  The colonoscopy was performed without difficulty. -  The patient tolerated the procedure well. -  The quality of the bowel preparation was good. -  The ileocecal valve, appendiceal orifice, and rectum were photographed. Findings:        -  A 12 mm polyp was found in the rectum. The polyp was pedunculated. The           polyp was removed with a hot snare. Resection and retrieval were complete. -  A 10 mm polyp was found in the descending colon. The polyp was sessile. The polyp was removed with a cold snare. Resection and retrieval were           complete. -  A 7 mm polyp was found in the hepatic flexure. The polyp was sessile. The           polyp was removed with a cold snare. Resection and retrieval were complete. -  A frond-like/villous, fungating, infiltrative, friable and ulcerated           partially obstructing large mass was found in the ascending colon. The mass           was partially circumferential (involving two-thirds of the lumen           circumference). The mass measured 6 cm (in length). This mass was extending           into the hepatic flexure. No spontaneous bleeding was present. Biopsies were           taken with a cold forceps for histology. -  A frond-like/villous, ulcerated, infiltrative ,friable  and polypoid           non-obstructing large mass was found in the cecum. The mass was partially           circumferential (involving one-half of the lumen circumference). No bleeding           was present. Biopsies were taken with a cold forceps for histology. Impression:        -  One 12 mm polyp in the rectum, removed with a hot snare. Resected and           retrieved. -  One 10 mm polyp in the descending colon, removed with a cold snare. Resected and retrieved. -  One 7 mm polyp at the hepatic flexure, removed with a cold snare. Resected           and retrieved. 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 Biopsied.        -  Likely malignant tumor in the cecum. Biopsied. Recommendation:        -  Return to my office as previously scheduled. - surgical consult after discussing with patient and family.  Procedure Code(s):        - [id number], Colonoscopy, flexible; with removal of tumor(s), polyp(s), or other           lesion(s) by snare technique        - 26634-52, Colonoscopy, flexible; with biopsy, single or multiple Diagnosis Code(s):        - R93.3, Abnormal findings on diagnostic imaging of other parts of digestive           tract        - D12.8, Benign neoplasm of rectum        - D12.4, Benign neoplasm of descending colon        - D12.3, Benign neoplasm of transverse colon (hepatic flexure or splenic           flexure)        - D49.0, Neoplasm of unspecified behavior of digestive system        - K56.690, Other partial intestinal obstruction       CPT(R) - 2022 copyright [employer] Association. All Rights Reserved. The CPT codes, CCI edits and ICD codes generated are intended as suggestions       and were generated based on input data. These codes are preliminary and upon        review may be revised to meet current compliance and payer requirements. The provider is responsible for the final determination of appropriate codes,       and modifiers. Scope Withdrawal Time:       00:15:21 Signature Name: Marco Ribeiro MD Signature Statement: This document has been electronically signed. Note Initiated On:1/4/2023 Signature Date:1/4/2023 12:30 PM    Impression:   80 y.o. female admitted severe weakness and diarrhea following colonoscopy on 1/4/2023. Colonoscopy with Dr. Keo Bridges revealing a few polyps throughout the colon along with likely malignant partially obstructing tumor in the ascending colon along with a likely malignant tumor in the cecum, both areas biopsied. On admit, noted mild fever of 100.5, 94% on 4 L O2. Mild tachycardia with heart rate up to 103. Noted leukocytosis, WBCs 17.3 with left shift. Procalcitonin 0.63. Noted to have RLQ abscess since July, found to be a mass in December 2022 hospitalization. Treated with prolonged course of Zosyn for possible abscess. CT today with slight increase in size of the mass and new presence of gas indicating communication with the bowel, suggestive of abscess/necrotic mass with possible superimposed infection.   ID and colorectal surgery consulted. Plan:   -Medical management per primary team  -Await pathology results from recent colonoscopy  -Appreciate ID and colorectal surgery recommendations/input  -Further recommendations pending patient's clinical course    Comments: Thank you for allowing us to participate in the care of this patient. Will continue to follow. Please call if questions or concerns arise. Electronically signed by Zachary Hanna MD on 1/5/2023 at 8:19 AM    Please note this report has been partially produced using speech recognition software and may cause contain errors related to that system including grammar, punctuation and spelling as well as words and phrases that may seem inappropriate. If there are questions or concerns please feel free to contact me to clarify.

## 2023-01-05 NOTE — ED NOTES
Pt refusing second set of blood cultures. ASHLEY Knott made aware.      Subhash Hoyos RN  01/04/23 5892

## 2023-01-05 NOTE — ED PROVIDER NOTES
[facility] [address]  eMERGENCY dEPARTMENT eNCOUnter      Pt Name: Darci Villa  MRN: [id number]  [place] 7/12/1932  Date of evaluation: 1/4/2023  Provider: Demaris Severin, PA-C    CHIEF COMPLAINT       Chief Complaint   Patient presents with    Headache     Headache and weakness since colonoscopy this morning. HISTORY OF PRESENT ILLNESS   (Location/Symptom, Timing/Onset,Context/Setting, Quality, Duration, Modifying Factors, Severity)  Note limiting factors. Darci Villa is a 80 y.o. female who presents to the emergency for evaluation of weakness, possible dehydration and headache following colonoscopy today. Patient has been evaluated for a right lower quadrant abdominal mass and felt to be colonic mass perhaps even a neoplasm. Completing a colonoscopy today they did tissue biopsy the colonic mass and removed a polyp. She has had poor p.o. intake since the procedure and feels weak difficulty getting up and complaining of mild headache. Son is her primary caregiver and tried to make her sandwich which she did not want to eat and she has had some sips of fluid. No increased abdominal pain. No chest pain or shortness of breath. HPI    NursingNotes were reviewed. REVIEW OF SYSTEMS    (2-9 systems for level 4, 10 or more for level 5)     Review of Systems   Constitutional:  Negative for chills and diaphoresis. HENT:  Negative for congestion, ear pain, mouth sores and sore throat. Eyes:  Negative for photophobia and discharge. Respiratory:  Negative for cough, chest tightness, shortness of breath and wheezing. Cardiovascular:  Negative for chest pain and palpitations. Gastrointestinal:  Negative for abdominal distention, abdominal pain, nausea and vomiting. Endocrine: Negative for cold intolerance. Genitourinary:  Negative for difficulty urinating. Musculoskeletal:  Negative for arthralgias. Skin:  Negative for pallor and rash.    Allergic/Immunologic: Negative for immunocompromised state. Neurological:  Positive for weakness and headaches. Negative for dizziness and syncope. Hematological:  Negative for adenopathy. Psychiatric/Behavioral:  Negative for agitation and hallucinations. All other systems reviewed and are negative. Except as noted above the remainder of the review of systems was reviewed and negative.        PAST MEDICAL HISTORY     Past Medical History:   Diagnosis Date    Ascending aortic aneurysm 6/23/2017    Charcot Arleen Tooth muscular atrophy     CHF (congestive heart failure) (HCC)     Chronic diastolic CHF (congestive heart failure) ([facility] 4/1/2022    Depression     Descending aortic aneurysm ([facility] 6/23/2017    Essential hypertension 2/16/2018    Headache     HTN (hypertension)     Impaired mobility and activities of daily living     Lumbar stenosis with neurogenic claudication     Myelopathy ([facility]     Osteoarthritis          SURGICALHISTORY       Past Surgical History:   Procedure Laterality Date    COLONOSCOPY N/A 1/4/2023    COLONOSCOPY DIAGNOSTIC performed by Rob Lindo MD at [facility]    IR INS PICC VAD W SQ PORT GREATER THAN 5  9/23/2022    IR INS PICC VAD W SQ PORT GREATER THAN 5 9/23/2022 [facility] SPECIAL PROCEDURE    JOINT REPLACEMENT Bilateral     knees    OTHER SURGICAL HISTORY Right 07/21/2022    cat scan guided abdominal mass aspiration with drain placement by Dr. Gaby Cheng Left 02/25/2021    LEFT PLEURAL CATHETER INSERTION performed by Willian Lipscomb MD at [place] [address] Left 01/29/2021    total of 755 cc removed per Dr Pop Salt specimen sent to lab         [address]       Discharge Medication List as of 1/10/2023  7:39 AM        CONTINUE these medications which have NOT CHANGED    Details   albuterol sulfate HFA (PROVENTIL;VENTOLIN;PROAIR) 108 (90 Base) MCG/ACT inhaler Inhale 2 puffs into the lungs every 6 hours as needed for Wheezing, Disp-18 g, R-3Normal      digoxin (LANOXIN) 125 MCG tablet Take 1 tablet by mouth daily, Disp-30 tablet, R-3Normal      sacubitril-valsartan (ENTRESTO) 24-26 MG per tablet Take 1 tablet by mouth 2 times daily, Disp-60 tablet, R-1Normal      sucralfate (CARAFATE) 1 GM tablet Take 1 tablet by mouth 4 times daily, Disp-120 tablet, R-3Normal      Lactobacillus TABS Take 2 mg by mouth daily 500 million CFU dailyHistorical Med      rivaroxaban (XARELTO) 15 MG TABS tablet Take 1 tablet by mouth daily, Disp-90 tablet, R-3Normal      meclizine (ANTIVERT) 25 MG tablet Take 25 mg by mouth every 6 hours as needed for Dizziness or NauseaHistorical Med      Vitamin D (CHOLECALCIFEROL) 50 MCG (2000 UT) TABS tablet Take 1 tablet by mouth Daily with supper, Disp-60 tablet, R-5Labeling may look different. 25 mcg=1000 Units. Please double check dosages. Normal      potassium chloride (KLOR-CON M) 20 MEQ extended release tablet Take 20 mEq by mouth 2 times daily Indications: Nutritional SupportHistorical Med      furosemide (LASIX) 20 MG tablet TAKE 1 TABLET DAILY, Disp-90 tablet, R-3Normal      pantoprazole (PROTONIX) 40 MG tablet TAKE 1 TABLET DAILY, Disp-90 tablet, R-3Normal      carvedilol (COREG) 6.25 MG tablet TAKE 1 TABLET TWICE A DAY, Disp-180 tablet, R-3Normal      nitroGLYCERIN (NITROSTAT) 0.4 MG SL tablet up to max of 3 total doses.  If no relief after 1 dose, call 911., Disp-25 tablet, R-3Normal      ferrous sulfate (IRON 325) 325 (65 Fe) MG tablet Take 1 tablet by mouth 2 times daily (with meals), Disp-30 tablet, R-3Normal      Carboxymethylcellulose Sodium (EYE DROPS OP) Apply 1 drop to eye as needed (Dry eyes) Indications: Systane Historical Med      Boswellia-Glucosamine-Vit D (OSTEO BI-FLEX ONE PER DAY) TABS Take 1 tablet by mouth daily Indications: Nutritional Support Triple strengthHistorical Med      Multiple Vitamins-Minerals (CENTRUM SILVER ULTRA WOMENS) TABS Take 1 tablet by mouth daily Indications: Nutritional SupportHistorical Med      vitamin B-12 (CYANOCOBALAMIN) 1000 MCG tablet Take 1,000 mcg by mouth daily Indications: Nutritional SupportHistorical Med      citalopram (CELEXA) 20 MG tablet Take 20 mg by mouth daily Indications: DepressionHistorical Med      SYNTHROID 88 MCG tablet Take 88 mcg by mouth every morning (before breakfast) Indications: Underactive Thyroid, DAWHistorical Med             ALLERGIES     Latex and Tape [adhesive tape]    FAMILY HISTORY       Family History   Problem Relation Age of Onset    Arthritis Mother     Arthritis Father     High Blood Pressure Father     Colon Cancer Brother           SOCIAL HISTORY       Social History     Socioeconomic History    Marital status:       Spouse name: None    Number of children: 2    Years of education: None    Highest education level: None   Tobacco Use    Smoking status: Never    Smokeless tobacco: Never   Vaping Use    Vaping Use: Never used   Substance and Sexual Activity    Alcohol use: No     Alcohol/week: 0.0 standard drinks    Drug use: No    Sexual activity: Not Currently   Social History Narrative    , Lives With: Alone, son lives down the street, dtr is in the area    Type of Home: [facility] [place]  in [address]: One level    Home Access: Stairs to enter with rails- Number of Steps: 2- Rails: Both    Bathroom Shower/Tub: Tub/Shower unit, Bathroom Equipment: Grab bars in shower, Shower chair    Home Equipment: Rolling walker, Cane(Pt infrequemtly uses DME for ambulation and prefers to furniture walk in home)    ADL Assistance: Independent, [address] [place] [address] Responsibilities: Yes    Ambulation Assistance: Independent, Transfer Assistance: Independent    Additional Comments: Son stops over 2 times daily           SCREENINGS    Pollock Coma Scale  Eye Opening: Spontaneous  Best Verbal Response: Confused  Best Motor Response: Obeys commands  Pollock Coma Scale Score: 14 @FLOW([id number])@      PHYSICAL EXAM    (up to 7 for level 4, 8 or more for level 5)     ED Triage Vitals [01/04/23 1819]   BP Temp Temp Source Heart Rate Resp SpO2 Height Weight   107/70 (!) 100.5 °F (38.1 °C) Oral 64 18 94 % 5' (1.524 m) 150 lb (68 kg)       Physical Exam  Vitals and nursing note reviewed. Constitutional:       Appearance: She is well-developed. HENT:      Head: Normocephalic. Nose: Nose normal.      Mouth/Throat:      Mouth: Mucous membranes are dry. Eyes:      Conjunctiva/sclera: Conjunctivae normal.      Pupils: Pupils are equal, round, and reactive to light. Cardiovascular:      Rate and Rhythm: Normal rate and regular rhythm. Heart sounds: Normal heart sounds. Pulmonary:      Effort: Pulmonary effort is normal.      Breath sounds: Normal breath sounds. Abdominal:      General: Bowel sounds are normal.      Palpations: Abdomen is soft. Tenderness: There is no abdominal tenderness. There is no guarding. Musculoskeletal:         General: Normal range of motion. Cervical back: Normal range of motion and neck supple. Skin:     General: Skin is warm and dry. Capillary Refill: Capillary refill takes less than 2 seconds. Neurological:      General: No focal deficit present. Mental Status: She is alert and oriented to person, place, and time.    Psychiatric:         Mood and Affect: Mood normal.       DIAGNOSTIC RESULTS     EKG: All EKG's are interpreted by the Emergency Department Physician who either signs or Co-signsthis chart in the absence of a cardiologist.      RADIOLOGY:   Gregoria Helena such as CT, Ultrasound and MRI are read by the radiologist. Plain radiographic images are visualized and preliminarily interpreted by the emergency physician with the below findings:      Interpretation per the Radiologist below, if available at the time ofthis note:    CT ABDOMEN PELVIS W IV CONTRAST Additional Contrast? None   Final Result   Slight increase seen in size of the complex process in the right lower quadrant with new presence of gas indicating communication with the bowel. The process appears to be centered around the cecum with involvement as well   seen in the ascending colon and several distal small bowel loops. Findings   concerning for abscess or necrotic mass with possible superimposed infection. Stable 9.1 cm cystic lesion in the left adnexa in which 6-12 month follow-up   can be performed as well as gynecologic consultation as clinically indicated. CT HEAD WO CONTRAST   Final Result   No acute intracranial abnormality. XR CHEST PORTABLE   Final Result   Cardiomegaly with the dilated aortic arch and descending thoracic aorta. Consider CT for better assessment. Patchy left basilar infiltrate and effusion likely pneumonia.          IR MIDLINE CATH    (Results Pending)         ED BEDSIDE ULTRASOUND:   Performed by ED Physician - none    LABS:  Labs Reviewed   CULTURE, BLOOD 1 - Abnormal; Notable for the following components:       Result Value    Blood Culture, Routine   (*)     Value: Gram stain aerobic bottle  Gram positive cocci in clusters-resembling Staph  1 out of 2 blood cultures  Gram stain anaerobic bottle  Gram negative rods  1 out of 2 blood cultures  Further results to follow  Further testing performed at [place] [address]      All other components within normal limits    Narrative:     ORDER#: [id number]                          ORDERED BY: GINNA Smith  SOURCE: Blood                              COLLECTED:  01/05/23 03:48  ANTIBIOTICS AT [facility].:                      RECEIVED :  01/05/23 03:56  CALL  Dial  [facility] tel. [phone],  Blood culture called & read back [place] [facility] [place], 01/07/2023 06:37, by NORCAP LODALIZA  Blood culture called & read [place] [facility] 01/07/2023 06:36, by IPDIA  BLOOD CULTURE results called to and read back by Yolis Keating, 01/05/2023  23:26, by NAOMIE   CULTURE, BLOOD ID SENSITIVITY - Abnormal; Notable for the following components:    Culture, Blood Id Sensitivity   (*)     Value: Cult,Blood:  POSITIVE Blood Culture  Performed at [address]  [phone]      Organism Staphylococcus coagulase-negative (*)     Organism Clostridium septicum (*)     All other components within normal limits    Narrative:     ORDER#: [id number]                          ORDERED BY: GINNA Salas  SOURCE: Blood                              COLLECTED:  01/05/23 03:48  ANTIBIOTICS AT [facility].:                      RECEIVED :  01/05/23 03:56  anaerobic- gnb  [id number]   CULTURE, BLOOD ID SENSITIVITY - Abnormal; Notable for the following components:    Culture, Blood Id Sensitivity   (*)     Value: Cult,Blood:  POSITIVE Blood Culture  Performed at [facility] [address], [address]  [phone]      Organism Staphylococcus coagulase-negative (*)     All other components within normal limits    Narrative:     ORDER#: [id number]                          ORDERED BY: GINNA Salas  SOURCE: Blood                              COLLECTED:  01/04/23 03:48  ANTIBIOTICS AT [facility].:                      RECEIVED :  01/04/23 03:56  Gram stain aerobic bottle  Gram positive cocci in clusters-resembling Staph  1 out of 2 blood cultures  Gram stain anaerobic bottle  Gram negative rods  1 out of 2 blood cultures  Further results to follow  Further testing performed at [address] [place] [address] - Abnormal; Notable for the following components:    WBC 17.3 (*)     RBC 4.17 (*)     Hemoglobin 11.2 (*)     Hematocrit 34.6 (*)     MCH 26.8 (*)     MCHC 32.2 (*)     RDW 16.4 (*)     Neutrophils Absolute 15.3 (*)     Lymphocytes Absolute 0.8 (*)     Monocytes Absolute 1.1 (*)     All other components within normal limits   COMPREHENSIVE METABOLIC PANEL - Abnormal; Notable for the following components:    Potassium 3.0 (*)     Anion Gap 18 (*)     Glucose 152 (*)     Albumin 3.0 (*)     Total Bilirubin 0.8 (*)     Globulin 3.6 (*)     All other components within normal limits    Narrative:     Sebastian Evans tel. [phone],  Chemistry results called to and read back by francisco oliva, 01/04/2023 22:06, by  Bala Rivera TO CULTURE - Abnormal; Notable for the following components:    Color, UA DARK YELLOW (*)     Clarity, UA CLOUDY (*)     Bilirubin Urine SMALL (*)     Ketones, Urine TRACE (*)     Blood, Urine TRACE (*)     Protein, UA 30 (*)     Leukocyte Esterase, Urine TRACE (*)     All other components within normal limits   PROCALCITONIN - Abnormal; Notable for the following components:    Procalcitonin 0.63 (*)     All other components within normal limits    Narrative:     CALL  Dial  ED tel. [phone],  Chemistry results called to and read back by francisco oliva, 01/04/2023 22:06, by  2 Consuel[address] Mandel - Abnormal; Notable for the following components:    Hyaline Casts, UA 20-40 (*)     RBC, UA 3-5 (*)     All other components within normal limits   PROCALCITONIN - Abnormal; Notable for the following components:    Procalcitonin 0.80 (*)     All other components within normal limits   BASIC METABOLIC PANEL W/ REFLEX TO MG FOR LOW K - Abnormal; Notable for the following components:    Sodium 134 (*)     Glucose 213 (*)     BUN 24 (*)     All other components within normal limits   HEPATIC FUNCTION PANEL - Abnormal; Notable for the following components:    Albumin 2.9 (*)     All other components within normal limits   BASIC METABOLIC PANEL W/ REFLEX TO MG FOR LOW K - Abnormal; Notable for the following components:    Potassium reflex Magnesium 3.3 (*)     All other components within normal limits   HEPATIC FUNCTION PANEL - Abnormal; Notable for the following components:     Total Protein 6.1 (*)     Albumin 2.6 (*)     All other components within normal limits   CBC WITH AUTO DIFFERENTIAL - Abnormal; Notable for the following components:    WBC 15.9 (*)     RBC 4.14 (*)     Hemoglobin 11.4 (*)     Hematocrit 34.3 (*)     RDW 16.5 (*)     Neutrophils Absolute 13.8 (*)     Lymphocytes Absolute 0.8 (*)     Monocytes Absolute 1.1 (*)     All other components within normal limits   BASIC METABOLIC PANEL W/ REFLEX TO MG FOR LOW K - Abnormal; Notable for the following components:    Glucose 106 (*)     Creatinine 0.47 (*)     All other components within normal limits   HEPATIC FUNCTION PANEL - Abnormal; Notable for the following components:     Total Protein 5.5 (*)     Albumin 2.3 (*)     All other components within normal limits   CBC WITH AUTO DIFFERENTIAL - Abnormal; Notable for the following components:    WBC 11.3 (*)     RBC 3.81 (*)     Hemoglobin 10.6 (*)     Hematocrit 31.7 (*)     RDW 16.9 (*)     Neutrophils Absolute 9.3 (*)     Lymphocytes Absolute 0.9 (*)     Monocytes Absolute 0.9 (*)     All other components within normal limits   BASIC METABOLIC PANEL W/ REFLEX TO MG FOR LOW K - Abnormal; Notable for the following components:    Anion Gap 8 (*)     Glucose 105 (*)     Creatinine 0.46 (*)     All other components within normal limits   CBC WITH AUTO DIFFERENTIAL - Abnormal; Notable for the following components:    WBC 11.7 (*)     RBC 3.84 (*)     Hemoglobin 10.6 (*)     Hematocrit 32.3 (*)     MCHC 32.7 (*)     RDW 16.4 (*)     Neutrophils Absolute 9.0 (*)     Monocytes Absolute 1.1 (*)     All other components within normal limits   RAPID INFLUENZA A/B ANTIGENS   COVID-19, RAPID   CULTURE, BLOOD 1    Narrative:     ORDER#: [id number]                          ORDERED BY: GINNA Bird  SOURCE: Blood                              COLLECTED:  01/05/23 03:48  ANTIBIOTICS AT [facility].:                      RECEIVED :  01/05/23 03:56   BLOOD ID PANEL, MOLECULAR    Narrative:     Adline Lot tel. [phone],  BLOOD CULTURE results called to and read back by Vargas Larose, 01/05/2023  23:26, by [facility][note 3], BLOOD 1    Narrative:     ORDER#: [id number]                          ORDERED BY: Kyle Jaramillo  SOURCE: Blood                              COLLECTED:  01/07/23 05:12  ANTIBIOTICS AT [facility].:                      RECEIVED :  01/07/23 06:21   COVID-19, RAPID   COVID-19, RAPID   LACTIC ACID   LACTIC ACID   MAGNESIUM       All other labs were within normal range or not returned as of this dictation. EMERGENCY DEPARTMENT COURSE and DIFFERENTIAL DIAGNOSIS/MDM:   Vitals:    Vitals:    01/09/23 0936 01/09/23 1916 01/10/23 0725 01/10/23 0834   BP: (!) 92/55 114/60 121/70 129/80   Pulse: 57 76 60 58   Resp: 18 18  18   Temp:  97.7 °F (36.5 °C) 98.2 °F (36.8 °C)    TempSrc:  Oral Oral    SpO2: 98% 98%  93%   Weight:       Height:              MDM        CONSULTS:  IP CONSULT TO GI  IP CONSULT TO GENERAL SURGERY  IP CONSULT TO INFECTIOUS DISEASES  IP CONSULT TO PALLIATIVE CARE  IP CONSULT TO REHAB/TCU ADMISSION COORDINATOR  IP CONSULT TO REHAB/TCU ADMISSION COORDINATOR  IP CONSULT TO INFECTIOUS DISEASES  IP CONSULT TO PALLIATIVE CARE    PROCEDURES:  Unless otherwise noted below, none     Procedures    FINAL IMPRESSION      1. Pneumonia of left lower lobe due to infectious organism    2. Generalized weakness    3. Dehydration    4. Acute nonintractable headache, unspecified headache type    5. Gram-negative bacteremia          DISPOSITION/PLAN   DISPOSITION Admitted 01/05/2023 12:29:56 AM      PATIENT REFERRED TO:  No follow-up provider specified. DISCHARGE MEDICATIONS:  Discharge Medication List as of 1/10/2023  7:39 AM        START taking these medications    Details   meropenem (MERREM) infusion Infuse 1,000 mg intravenously in the morning and 1,000 mg at noon and 1,000 mg in the evening. Do all this for 11 days. Compound per protocol. ., Disp-1 each, R-0NO PRINT                (Please note that portions of this note were completed with a voice recognition program.  Efforts were made to edit the dictations but occasionally words are mis-transcribed.)    Elba Knox PA-C (electronically signed)  Attending Emergency Physician         Thora Collet, MD  01/04/23 2338       Frankey Grimmer, PA-C  01/13/23 4045

## 2023-01-05 NOTE — FLOWSHEET NOTE
0230 spoke with pt. Son Mira regarding pt. Home medications, per son he will come in the AM to go over pt. Home medications. Pt. Was unable to use [place]  and was only orient to self. Spoke with Dr. Cynthia Ruiz and is aware pt. Home med req is not complete and pt. Son will come in the AM to go over with dayshift nursing staff.

## 2023-01-05 NOTE — CONSULTS
Department of General Surgery - Adult  Surgical Service colorectal surgery  Attending Consult Note      Reason for Consult: Right colon mass      CHIEF COMPLAINT: Right colon mass    History Obtained From:  electronic medical record    HISTORY OF PRESENT ILLNESS:                The patient is a 80 y.o. female who presents with right colon mass. I have known this woman since around July 2022 where she had this large fluid collection/mass around her ascending colon. At that time the suspicion was malignancy. She had a percutaneous drain placed back in July which was later taken out about 6 to 8 weeks later. She spent time on the inpatient side of the hospital and then spent time at the rehab unit. During that time I had talked to multiple members of the family. There was a son I had spoken to as well as a daughter from [place] during her rehab stay. Each time I suspected and told them that I think she has a tumor present in the ascending colon which is quite large and probably advanced stage just given its size. Every discussion ended in palliative treatment. She returned back to the interventional radiology office a few months ago and I had spoke with Isha Serrano regarding her. I had talked about deciding whether she would be palliative care or transferred to [facility] clinic as a higher level of care for surgical discussions in this high risk patient. According to Ms. Baez, family was going to come speak to me but it sounds like they had decided to proceed with palliative care. She returns to the hospital again with fatigue and a repeat CAT scan shows the same mass that has been present since her initial visit. This time she had a colonoscopy which showed a likely malignant mass in the right colon. Apparently there is some type of confusion as to which way to proceed. Patient does not speak any [place].     I have spoken with Dr. Joyce Edward regarding the history of this patient and decisions that have been made over the past 5 months. No family members currently present during this consultation. Past Medical History:        Diagnosis Date    Ascending aortic aneurysm 6/23/2017    Charcot Arleen Tooth muscular atrophy     CHF (congestive heart failure) (HCC)     Chronic diastolic CHF (congestive heart failure) ([facility] 4/1/2022    Depression     Descending aortic aneurysm ([facility] 6/23/2017    Essential hypertension 2/16/2018    Headache     HTN (hypertension)     Impaired mobility and activities of daily living     Lumbar stenosis with neurogenic claudication     Myelopathy ([facility]     Osteoarthritis      Past Surgical History:        Procedure Laterality Date    COLONOSCOPY N/A 1/4/2023    COLONOSCOPY DIAGNOSTIC performed by Nasir Kerns MD at [facility]    IR INS PICC VAD W SQ PORT GREATER THAN 5  9/23/2022    IR INS PICC VAD W SQ PORT GREATER THAN 5 9/23/2022 [facility] PROCEDURE    JOINT REPLACEMENT Bilateral     knees    OTHER SURGICAL HISTORY Right 07/21/2022    cat scan guided abdominal mass aspiration with drain placement by Dr. Dania Low Left 02/25/2021    LEFT PLEURAL CATHETER INSERTION performed by Betina Adkins MD at [place] [id number] Left 01/29/2021    total of 755 cc removed per Dr Rufino Callejas specimen sent to lab     Current Medications:   Current Facility-Administered Medications: meropenem (MERREM) 1,000 mg in sodium chloride 0.9 % 100 mL IVPB (mini-bag), 1,000 mg, IntraVENous, Q8H  vancomycin 1000 mg IVPB in 250 mL D5W addavial, 1,000 mg, IntraVENous, Q24H  pantoprazole (PROTONIX) 40 mg in sodium chloride (PF) 0.9 % 10 mL injection, 40 mg, IntraVENous, Daily  Allergies:  Latex and Tape [adhesive tape]    Social History:   TOBACCO:   reports that she has never smoked. She has never used smokeless tobacco.  ETOH:   reports no history of alcohol use. DRUGS:   reports no history of drug use.   Family History:       Problem Relation Age of Onset Arthritis Mother     Arthritis Father     High Blood Pressure Father     Colon Cancer Brother        REVIEW OF SYSTEMS:    Review of systems not obtained due to patient factors -unable to speak any [place]. No [place]  available    PHYSICAL EXAM:    VITALS:  /68   Pulse 90   Temp 97.5 °F (36.4 °C) (Oral)   Resp 18   Ht 5' (1.524 m)   Wt 150 lb (68 kg)   SpO2 95%   BMI 29.29 kg/m²   CONSTITUTIONAL: Fatigued in no distress  EYES:  Lids and lashes normal, pupils equal, round and reactive to light, extra ocular muscles intact, sclera clear, conjunctiva normal  ENT:  Normocephalic, without obvious abnormality, atraumatic, sinuses nontender on palpation, external ears without lesions, oral pharynx with moist mucus membranes, tonsils without erythema or exudates, gums normal and good dentition. NECK:  Supple, symmetrical, trachea midline, no adenopathy, thyroid symmetric, not enlarged and no tenderness, skin normal  HEMATOLOGIC/LYMPHATICS:  no cervical lymphadenopathy  BACK:  Symmetric, no curvature, spinous processes are non-tender on palpation, paraspinous muscles are non-tender on palpation, no costal vertebral tenderness  LUNGS:  No increased work of breathing, good air exchange, clear to auscultation bilaterally, no crackles or wheezing  CARDIOVASCULAR:  Normal apical impulse, regular rate and rhythm, normal S1 and S2, no S3 or S4, and no murmur noted  ABDOMEN: Soft and nondistended, fullness right abdomen  MUSCULOSKELETAL:  There is no redness, warmth, or swelling of the joints. Full range of motion noted. Motor strength is 5 out of 5 all extremities bilaterally.   Tone is normal.  NEUROLOGIC: Arousable awake  SKIN:  no bruising or bleeding  DATA:    CBC:   Lab Results   Component Value Date/Time    WBC 17.3 01/04/2023 09:08 PM    RBC 4.17 01/04/2023 09:08 PM    HGB 11.2 01/04/2023 09:08 PM    HCT 34.6 01/04/2023 09:08 PM    MCV 83.1 01/04/2023 09:08 PM    MCH 26.8 01/04/2023 09:08 PM    MCHC 32.2 01/04/2023 09:08 PM    RDW 16.4 01/04/2023 09:08 PM     01/04/2023 09:08 PM     CMP:    Lab Results   Component Value Date/Time     01/05/2023 03:48 AM    K 4.1 01/05/2023 03:48 AM    CL 97 01/05/2023 03:48 AM    CO2 22 01/05/2023 03:48 AM    BUN 24 01/05/2023 03:48 AM    CREATININE 0.88 01/05/2023 03:48 AM    GFRAA >60.0 10/11/2022 10:30 AM    LABGLOM >60.0 01/05/2023 03:48 AM    GLUCOSE 213 01/05/2023 03:48 AM    PROT 6.5 01/05/2023 03:48 AM    LABALBU 2.9 01/05/2023 03:48 AM    CALCIUM 9.3 01/05/2023 03:48 AM    BILITOT 0.6 01/05/2023 03:48 AM    ALKPHOS 52 01/05/2023 03:48 AM    AST 28 01/05/2023 03:48 AM    ALT 14 01/05/2023 03:48 AM     Radiology Review: CT scan reviewed with references to multiple CT scans done over the past 5 months unchanged    IMPRESSION/RECOMMENDATIONS:      Ascending colon mass    Over the past few months each family member I talked to was interested in palliative care. No family members present currently. If this changes for some reason, my initial discussion regarding transfer to a tertiary care center for a very difficult high risk patient who will have multiple postoperative concerns remains present. Family discussion and palliative consult needed. The issue is that there are multiple family members and some that are from out of state. It is difficult to have contact with consistent family members.     Discussed with Dr. Darrell Figueroa

## 2023-01-06 PROBLEM — Z71.89 ENCOUNTER FOR HOSPICE CARE DISCUSSION: Status: ACTIVE | Noted: 2023-01-06

## 2023-01-06 PROBLEM — R53.1 GENERALIZED WEAKNESS: Status: ACTIVE | Noted: 2023-01-06

## 2023-01-06 PROBLEM — J18.9 PNEUMONIA OF LEFT LOWER LOBE DUE TO INFECTIOUS ORGANISM: Status: ACTIVE | Noted: 2023-01-01

## 2023-01-06 PROBLEM — R78.81 GRAM-NEGATIVE BACTEREMIA: Status: ACTIVE | Noted: 2023-01-06

## 2023-01-06 PROBLEM — Z87.898 H/O ABDOMINAL ABSCESS: Status: ACTIVE | Noted: 2023-01-06

## 2023-01-06 LAB
ALBUMIN SERPL-MCNC: 2.6 G/DL (ref 3.5–4.6)
ALP BLD-CCNC: 54 U/L (ref 40–130)
ALT SERPL-CCNC: 13 U/L (ref 0–33)
ANION GAP SERPL CALCULATED.3IONS-SCNC: 12 MEQ/L (ref 9–15)
AST SERPL-CCNC: 25 U/L (ref 0–35)
BASOPHILS ABSOLUTE: 0 K/UL (ref 0–0.2)
BASOPHILS RELATIVE PERCENT: 0.2 %
BILIRUB SERPL-MCNC: 0.7 MG/DL (ref 0.2–0.7)
BILIRUBIN DIRECT: 0.3 MG/DL (ref 0–0.4)
BILIRUBIN, INDIRECT: 0.4 MG/DL (ref 0–0.6)
BUN BLDV-MCNC: 21 MG/DL (ref 8–23)
CALCIUM SERPL-MCNC: 9.3 MG/DL (ref 8.5–9.9)
CHLORIDE BLD-SCNC: 101 MEQ/L (ref 95–107)
CO2: 23 MEQ/L (ref 20–31)
CREAT SERPL-MCNC: 0.57 MG/DL (ref 0.5–0.9)
EOSINOPHILS ABSOLUTE: 0 K/UL (ref 0–0.7)
EOSINOPHILS RELATIVE PERCENT: 0.2 %
GFR SERPL CREATININE-BSD FRML MDRD: >60 ML/MIN/{1.73_M2}
GLUCOSE BLD-MCNC: 97 MG/DL (ref 70–99)
HCT VFR BLD CALC: 34.3 % (ref 37–47)
HEMOGLOBIN: 11.4 G/DL (ref 12–16)
LYMPHOCYTES ABSOLUTE: 0.8 K/UL (ref 1–4.8)
LYMPHOCYTES RELATIVE PERCENT: 5.1 %
MAGNESIUM: 1.7 MG/DL (ref 1.7–2.4)
MCH RBC QN AUTO: 27.5 PG (ref 27–31.3)
MCHC RBC AUTO-ENTMCNC: 33.2 % (ref 33–37)
MCV RBC AUTO: 82.9 FL (ref 79.4–94.8)
MONOCYTES ABSOLUTE: 1.1 K/UL (ref 0.2–0.8)
MONOCYTES RELATIVE PERCENT: 7.2 %
NEUTROPHILS ABSOLUTE: 13.8 K/UL (ref 1.4–6.5)
NEUTROPHILS RELATIVE PERCENT: 87.3 %
PDW BLD-RTO: 16.5 % (ref 11.5–14.5)
PLATELET # BLD: 209 K/UL (ref 130–400)
POTASSIUM REFLEX MAGNESIUM: 3.3 MEQ/L (ref 3.4–4.9)
RBC # BLD: 4.14 M/UL (ref 4.2–5.4)
SODIUM BLD-SCNC: 136 MEQ/L (ref 135–144)
TOTAL PROTEIN: 6.1 G/DL (ref 6.3–8)
WBC # BLD: 15.9 K/UL (ref 4.8–10.8)

## 2023-01-06 PROCEDURE — 2580000003 HC RX 258: Performed by: INTERNAL MEDICINE

## 2023-01-06 PROCEDURE — 83735 ASSAY OF MAGNESIUM: CPT

## 2023-01-06 PROCEDURE — 6370000000 HC RX 637 (ALT 250 FOR IP): Performed by: INTERNAL MEDICINE

## 2023-01-06 PROCEDURE — 6360000002 HC RX W HCPCS: Performed by: INTERNAL MEDICINE

## 2023-01-06 PROCEDURE — 97162 PT EVAL MOD COMPLEX 30 MIN: CPT

## 2023-01-06 PROCEDURE — 36415 COLL VENOUS BLD VENIPUNCTURE: CPT

## 2023-01-06 PROCEDURE — 87076 CULTURE ANAEROBE IDENT EACH: CPT

## 2023-01-06 PROCEDURE — 99223 1ST HOSP IP/OBS HIGH 75: CPT

## 2023-01-06 PROCEDURE — 97163 PT EVAL HIGH COMPLEX 45 MIN: CPT

## 2023-01-06 PROCEDURE — 85025 COMPLETE CBC W/AUTO DIFF WBC: CPT

## 2023-01-06 PROCEDURE — 99223 1ST HOSP IP/OBS HIGH 75: CPT | Performed by: INTERNAL MEDICINE

## 2023-01-06 PROCEDURE — 2700000000 HC OXYGEN THERAPY PER DAY

## 2023-01-06 PROCEDURE — 99232 SBSQ HOSP IP/OBS MODERATE 35: CPT | Performed by: NURSE PRACTITIONER

## 2023-01-06 PROCEDURE — 97167 OT EVAL HIGH COMPLEX 60 MIN: CPT

## 2023-01-06 PROCEDURE — 1210000000 HC MED SURG R&B

## 2023-01-06 PROCEDURE — 80076 HEPATIC FUNCTION PANEL: CPT

## 2023-01-06 PROCEDURE — 80048 BASIC METABOLIC PNL TOTAL CA: CPT

## 2023-01-06 RX ORDER — SODIUM CHLORIDE, SODIUM LACTATE, POTASSIUM CHLORIDE, CALCIUM CHLORIDE 600; 310; 30; 20 MG/100ML; MG/100ML; MG/100ML; MG/100ML
INJECTION, SOLUTION INTRAVENOUS CONTINUOUS
Status: DISCONTINUED | OUTPATIENT
Start: 2023-01-06 | End: 2023-01-07

## 2023-01-06 RX ORDER — ENOXAPARIN SODIUM 100 MG/ML
40 INJECTION SUBCUTANEOUS DAILY
Status: DISCONTINUED | OUTPATIENT
Start: 2023-01-06 | End: 2023-01-07

## 2023-01-06 RX ADMIN — Medication 1 TABLET: at 10:31

## 2023-01-06 RX ADMIN — MEROPENEM 1000 MG: 1 INJECTION, POWDER, FOR SOLUTION INTRAVENOUS at 04:55

## 2023-01-06 RX ADMIN — MEROPENEM 1000 MG: 1 INJECTION, POWDER, FOR SOLUTION INTRAVENOUS at 22:05

## 2023-01-06 RX ADMIN — ENOXAPARIN SODIUM 40 MG: 100 INJECTION SUBCUTANEOUS at 14:26

## 2023-01-06 RX ADMIN — Medication 2000 UNITS: at 17:15

## 2023-01-06 RX ADMIN — SACUBITRIL AND VALSARTAN 1 TABLET: 24; 26 TABLET, FILM COATED ORAL at 22:01

## 2023-01-06 RX ADMIN — MEROPENEM 1000 MG: 1 INJECTION, POWDER, FOR SOLUTION INTRAVENOUS at 13:05

## 2023-01-06 RX ADMIN — SODIUM CHLORIDE, POTASSIUM CHLORIDE, SODIUM LACTATE AND CALCIUM CHLORIDE: 600; 310; 30; 20 INJECTION, SOLUTION INTRAVENOUS at 02:25

## 2023-01-06 RX ADMIN — Medication 2 TABLET: at 10:31

## 2023-01-06 RX ADMIN — CARVEDILOL 6.25 MG: 6.25 TABLET, FILM COATED ORAL at 10:31

## 2023-01-06 RX ADMIN — CARVEDILOL 6.25 MG: 6.25 TABLET, FILM COATED ORAL at 22:02

## 2023-01-06 RX ADMIN — CYANOCOBALAMIN TAB 500 MCG 1000 MCG: 500 TAB at 10:30

## 2023-01-06 RX ADMIN — CITALOPRAM HYDROBROMIDE 20 MG: 20 TABLET ORAL at 10:31

## 2023-01-06 RX ADMIN — DIGOXIN 125 MCG: 125 TABLET ORAL at 10:31

## 2023-01-06 RX ADMIN — SACUBITRIL AND VALSARTAN 1 TABLET: 24; 26 TABLET, FILM COATED ORAL at 10:31

## 2023-01-06 RX ADMIN — LEVOTHYROXINE SODIUM 88 MCG: 0.09 TABLET ORAL at 05:06

## 2023-01-06 RX ADMIN — POTASSIUM BICARBONATE 40 MEQ: 782 TABLET, EFFERVESCENT ORAL at 14:26

## 2023-01-06 RX ADMIN — PANTOPRAZOLE SODIUM 40 MG: 40 TABLET, DELAYED RELEASE ORAL at 05:06

## 2023-01-06 ASSESSMENT — ENCOUNTER SYMPTOMS
WHEEZING: 0
NAUSEA: 0
COLOR CHANGE: 0
SHORTNESS OF BREATH: 0
ABDOMINAL PAIN: 0
DIARRHEA: 1
COUGH: 0
CONSTIPATION: 0
ABDOMINAL DISTENTION: 0
ANAL BLEEDING: 0
CHOKING: 0
RECTAL PAIN: 0
BLOOD IN STOOL: 0
VOMITING: 0
TROUBLE SWALLOWING: 0
EYE DISCHARGE: 0
CHEST TIGHTNESS: 0
VOICE CHANGE: 0
APNEA: 0
BACK PAIN: 0
STRIDOR: 0
SORE THROAT: 0

## 2023-01-06 ASSESSMENT — VISUAL ACUITY: OU: 1

## 2023-01-06 ASSESSMENT — PAIN SCALES - GENERAL: PAINLEVEL_OUTOF10: 0

## 2023-01-06 NOTE — PLAN OF CARE
Problem: Confusion  Goal: Confusion, delirium, dementia, or psychosis is improved or at baseline  Description: INTERVENTIONS:  1. Assess for possible contributors to thought disturbance, including medications, impaired vision or hearing, underlying metabolic abnormalities, dehydration, psychiatric diagnoses, and notify attending LIP  2. [place] high risk fall precautions, as indicated  3. Provide frequent short contacts to provide reality reorientation, refocusing and direction  4. Decrease environmental stimuli, including noise as appropriate  5. Monitor and intervene to maintain adequate nutrition, hydration, elimination, sleep and activity  6. If unable to ensure safety without constant attention obtain sitter and review sitter guidelines with assigned personnel  7. Initiate Psychosocial CNS and Spiritual Care consult, as indicated  Outcome: Progressing     Problem: Skin/Tissue Integrity  Goal: Absence of new skin breakdown  Description: 1. Monitor for areas of redness and/or skin breakdown  2. Assess vascular access sites hourly  3. Every 4-6 hours minimum:  Change oxygen saturation probe site  4. Every 4-6 hours:  If on nasal continuous positive airway pressure, respiratory therapy assess nares and determine need for appliance change or resting period.   Outcome: Progressing     Problem: ABCDS Injury Assessment  Goal: Absence of physical injury  Outcome: Progressing     Problem: Safety - Adult  Goal: Free from fall injury  Outcome: Progressing     Problem: Chronic Conditions and Co-morbidities  Goal: Patient's chronic conditions and co-morbidity symptoms are monitored and maintained or improved  Outcome: Progressing

## 2023-01-06 NOTE — PROGRESS NOTES
Gastroenterology Progress Note    Marin Foley is a 80 y.o. female patient. Hospitalization Day:1    Chief C/O: Large colon mass    SUBJECTIVE: Seen and examined on medical surgical unit. No acute events overnight. Tolerating her full liquid diet. ROS:  Gastrointestinal ROS: no nausea, abdominal pain, change in bowel habits, or black or bloody stools    Physical    VITALS:  BP (!) 156/111   Pulse 62   Temp 97.3 °F (36.3 °C) (Oral)   Resp 20   Ht 5' (1.524 m)   Wt 211 lb 11.2 oz (96 kg)   SpO2 96%   BMI 41.34 kg/m²   TEMPERATURE:  Current - Temp: 97.3 °F (36.3 °C); Max - Temp  Av.7 °F (36.5 °C)  Min: 97.3 °F (36.3 °C)  Max: 98.1 °F (36.7 °C)    General -alert, oriented, in no acute distress  Eyes - no Icterus, no pallor  Cardiovascular - RRR  Lungs - clear to auscultation bilaterally  Abdomen - + central obesity, non distended,  non tender, no organomegaly, Bowel sounds present, + Right abdominal mass  Extremities - no edema  Skin - warm/dry, without jaundice  Neuro: without focal deficit, moves all extremities, no asterixis     Data    Data Review:    Recent Labs     23  0520   WBC 17.3* 15.9*   HGB 11.2* 11.4*   HCT 34.6* 34.3*   MCV 83.1 82.9    209     Recent Labs     238 23  0520    134* 136   K 3.0* 4.1 3.3*   CL 97 97 101   CO2 21 22 23   BUN 20 24* 21   CREATININE 0.82 0.88 0.57     Recent Labs     23  0348 23  0520   AST 28 28 25   ALT 14 14 13   BILIDIR  --  <0.2 0.3   BILITOT 0.8* 0.6 0.7   ALKPHOS 53 52 54     No results for input(s): LIPASE, AMYLASE in the last 72 hours. No results for input(s): PROTIME, INR in the last 72 hours. Radiology Review:      CT abdomen pelvis with IV contrast today: Slight increase seen in size of the complex process in the right lower quadrant with new presence of gas indicating communication with the bowel.   The process appears to be centered around the cecum with involvement as well seen in the ascending colon and several distal small bowel loops. Findings concerning for abscess or necrotic mass with possible superimposed infection. Previous endoscopic history:  Colonoscopy 1/4/2023 Dr. Jamal Garner: One 12 mm polyp in the rectum, removed with a hot snare. Resected and retrieved. One 10 mm polyp in the descending colon, removed with a cold snare. Resected and retrieved. One 7 mm polyp at the hepatic flexure, removed with a cold snare. Resected and retrieved. Likely malignant partially obstructing tumor in the ascending colon. Biopsied. Likely malignant tumor in the cecum. Biopsied. ASSESSMENT:  80 y.o. female admitted severe weakness and diarrhea following colonoscopy on 1/4/2023. Colonoscopy with Dr. Jamal Garner revealing a few polyps throughout the colon along with likely malignant partially obstructing tumor in the ascending colon along with a likely malignant tumor in the cecum, both areas biopsied. On admit, noted mild fever of 100.5, 94% on 4 L O2. Mild tachycardia with heart rate up to 103. Noted leukocytosis, WBCs 17.3 with left shift. Procalcitonin 0.63. Noted to have RLQ abscess since July, found to be a mass in December 2022 hospitalization. Treated with prolonged course of Zosyn for possible abscess. CT today with slight increase in size of the mass and new presence of gas indicating communication with the bowel, suggestive of abscess/necrotic mass with possible superimposed infection. ID and colorectal surgery consulted. Per colorectal surgery, Dr. Marland Apley, recommendation for transfer to tertiary care center due to high risk/complexity. PLAN :  -Medical management per primary team  -Await pathology results from recent colonoscopy on 1/4/23. -Appreciate ID and colorectal surgery recommendations/input    Signing out GI consult service to Dr. Jamal Garner.   After 5 pm today Dr. Jamal Garner is covering hospital call and consult for GI/hepatology,  Marjorie Lee will continue to follow until sign off or discharge from the hospital.    Thank you for allowing me to participate in the care of your patient. Please feel free to contact me with any concerns.     Barbara Rodríguez, APRN - CNP

## 2023-01-06 NOTE — PROGRESS NOTES
Physician Progress Note    1/6/2023   1:36 PM    Name:  Heladio Arteaga  MRN:    [id number]     IP Day: 1     Admit Date: 1/4/2023  6:18 PM  PCP: Karis Steele MD    Code Status:  Full Code    Subjective:     Denies complaints    Current Facility-Administered Medications   Medication Dose Route Frequency Provider Last Rate Last Admin    meropenem (MERREM) 1,000 mg in sodium chloride 0.9 % 100 mL IVPB (mini-bag)  1,000 mg IntraVENous Q8H Lele Weir,  mL/hr at 01/06/23 1305 1,000 mg at 01/06/23 1305    lactated ringers infusion   IntraVENous Continuous Ghazala Reach, DO 75 mL/hr at 01/06/23 0225 New Bag at 01/06/23 0225    citalopram (CELEXA) tablet 20 mg  20 mg Oral Daily Ghazala Reach, DO   20 mg at 01/06/23 1031    digoxin (LANOXIN) tablet 125 mcg  125 mcg Oral Daily Ghazala Reach, DO   125 mcg at 01/06/23 1031    lactobacillus acidophilus [facility]) 2 tablet  2 tablet Oral Daily Ghazala Reach, DO   2 tablet at 01/06/23 1031    meclizine (ANTIVERT) tablet 25 mg  25 mg Oral Q6H PRN Ghazala Reach, DO        therapeutic multivitamin-minerals 1 tablet  1 tablet Oral Daily Ghazala Reach, DO   1 tablet at 01/06/23 1031    pantoprazole (PROTONIX) tablet 40 mg  40 mg Oral QAM AC Mick R Mae, DO   40 mg at 01/06/23 0506    levothyroxine (SYNTHROID) tablet 88 mcg  88 mcg Oral QAM AC Mick R Mae, DO   88 mcg at 01/06/23 0506    vitamin B-12 (CYANOCOBALAMIN) tablet 1,000 mcg  1,000 mcg Oral Daily Ghazala Reach, DO   1,000 mcg at 01/06/23 1030    vitamin D (CHOLECALCIFEROL) tablet 2,000 Units  2,000 Units Oral Dinner Ghazala Reach, DO        sacubitril-valsartan (ENTRESTO) 24-26 MG per tablet 1 tablet  1 tablet Oral BID Ghazala Reach, DO   1 tablet at 01/06/23 1031    carvedilol (COREG) tablet 6.25 mg  6.25 mg Oral BID Ghazala Reach, DO   6.25 mg at 01/06/23 1031       Physical Examination:      Vitals:  BP (!) 156/111   Pulse 62   Temp 97.3 °F (36.3 °C) (Oral)   Resp 20   Ht 5' (1.524 m)   Wt 211 lb 11.2 oz (96 kg)   SpO2 96%   BMI 41.34 kg/m²   Temp (24hrs), Av.7 °F (36.5 °C), Min:97.3 °F (36.3 °C), Max:98.1 °F (36.7 °C)      General appearance: Elderly, tired appearing, and chronically ill-appearing. Lungs: clear to auscultation bilaterally, normal effort  Heart: regular rate and rhythm, no murmur  Abdomen: Fullness and dullness to percussion right lower quadrant. Mild tenderness to palpation. No rebound tenderness or guarding. Extremities: no edema, redness, tenderness in the calves. Cap refill <2s    Data:     Labs:  Recent Labs     23  21023  0520   WBC 17.3* 15.9*   HGB 11.2* 11.4*    209     Recent Labs     23  0348 23  0520   * 136   K 4.1 3.3*   CL 97 101   CO2 22 23   BUN 24* 21   CREATININE 0.88 0.57   GLUCOSE 213* 97     Recent Labs     23  0348 23  0520   AST 28 25   ALT 14 13   BILITOT 0.6 0.7   ALKPHOS 52 54     CT Abd/Pelvis:  Slight increase seen in size of the complex process in the right lower   quadrant with new presence of gas indicating communication with the bowel. The process appears to be centered around the cecum with involvement as well   seen in the ascending colon and several distal small bowel loops. Findings   concerning for abscess or necrotic mass with possible superimposed infection. Stable 9.1 cm cystic lesion in the left adnexa in which 6-12 month follow-up   can be performed as well as gynecologic consultation as clinically indicated. Assessment and Plan:        1. Sepsis secondary to GNR bacteremia related to complex mass in the right lower quadrant  -IV meropenem. Infectious disease following. Follow final culture data  -Follow surgical pathology from colonoscopy. Noted elevated CEA (19.7)  -Continue gentle IVF  -Surgery and palliative care following. RN reports that son has questions regarding surgical plan.   I did discuss case with Dr. Alon Em on  who told me that this mass has been present for a long time and he has discussed surgical options extensively (which would require complicated surgery at tertiary center) for which family has repeatedly opted for conservative, nonsurgical management. Future plan to be determined between surgery, GI, and family. Palliative care also following. Class III obesity  Hypertension  Chronic combined heart failure    Diet: ADULT DIET;  Full Liquid  ADULT ORAL NUTRITION SUPPLEMENT; Breakfast, Dinner, Lunch; Diabetic Oral Supplement  Ppx: lovenox  Full Code    >35 minutes in total care time    Electronically signed by Roberto Holman DO on 1/6/2023 at 1:36 PM

## 2023-01-06 NOTE — CONSULTS
Palliative Care Consult Note  Patient: Ying Sandoval  Gender: female  YOB: 1932  Unit/Bed: B867/M569-11  CodeStatus: Full Code  Inpatient Treatment Team: Treatment Team: Attending Provider: Sandor Au DO; Consulting Physician: Cristobal Holliday DO; Consulting Physician: Fannie Walker MD; Consulting Physician: Malachi Lees MD; Utilization Reviewer: Sushma Alarcon RN; Registered Nurse: Elder Lainez RN; : Evan Mehta RN; Registered Nurse: Giuseppe Camacho RN  Admit Date:  1/4/2023    Chief Complaint: Headache    History of Presenting Illness:      Ying Sandoval is a 80 y.o. female on hospital day 1 with a history of chronic respiratory failure on home oxygen 2-3 L, shortness of breath, CHF, chronic diastolic CHF, atrial fibrillation, hypertension, abdominal abscess, CAD, ascending aortic aneurysm, Charcot-Arleen-Tooth muscular atrophy, depression, headache, lumbar stenosis, myelopathy, OA. Patient was brought to the emergency room with abdominal pain, weakness, diarrhea and headache following colonoscopy. Patient was admitted for large right lower quadrant fungaiting mass. Palliative care was consulted for goals of care, CODE STATUS discussion, family support, and symptom management. Upon entering the room I find the patient laying in bed with IV in Lt AC area running. Patient's previous functional status is alert and oriented x4 per patient's son Mira. Patient lives independently at home with son with DME consistent of walkers, cane and continuous oxygen. The patient reports having diarrhea for roughly 5 days. Patient is able to currently manage her bowels and bladder. Per conversation with nursing staff patient has a little redness on her sacrum region related to diarrhea and francisco-care. Patient speaks [place] but understands and is able to communicate some in [place]. Called the patient's son Mira via telephone call.   Patient would like CODE STATUS to be changed to limited but they are considering surgical intervention at [facility]. However, the patient does not want to have a colostomy \"tube for waist\". Patient's son Callie Herrlich is consulting with [facility] to inquire if they can do the surgery without end result of colostomy. Per our conversation if they cannot do the surgery without colostomy then the patient and family may choose alternative care, such as palliative care or hospice. No clear decision has been made at this time. Gave much education, clarity on condition, much time was spent and for active listening and answering questions. Most recent notable labs: Potassium 3.3, total protein 6.1, albumin 2.6, WBC 15.9, RBC 4.14, hemoglobin 11.4, hematocrit 34.3      Review of Systems:       Review of Systems   Constitutional:  Positive for activity change and appetite change. Negative for chills, diaphoresis, fatigue, fever and unexpected weight change. HENT:  Negative for drooling, hearing loss, mouth sores, sore throat, trouble swallowing and voice change. Eyes:  Negative for discharge and visual disturbance. Respiratory:  Negative for apnea, cough, choking, chest tightness, shortness of breath, wheezing and stridor. Cardiovascular:  Negative for chest pain, palpitations and leg swelling. Gastrointestinal:  Positive for diarrhea. Negative for abdominal distention, abdominal pain, anal bleeding, blood in stool, constipation, nausea, rectal pain and vomiting. Genitourinary:  Negative for difficulty urinating, dysuria, enuresis, frequency and hematuria. Musculoskeletal:  Negative for arthralgias, back pain, gait problem, joint swelling and myalgias. Skin:  Negative for color change, pallor, rash and wound. Allergic/Immunologic: Negative for food allergies and immunocompromised state. Neurological:  Positive for weakness.  Negative for dizziness, tremors, seizures, syncope, facial asymmetry, speech difficulty, light-headedness, numbness and headaches. Hematological:  Negative for adenopathy. Does not bruise/bleed easily. Psychiatric/Behavioral:  Negative for agitation, behavioral problems, confusion, decreased concentration, dysphoric mood, hallucinations, self-injury, sleep disturbance and suicidal ideas. The patient is not nervous/anxious and is not hyperactive. All other systems reviewed and are negative. Physical Examination:       /71   Pulse (!) 108   Temp 98.1 °F (36.7 °C) (Oral)   Resp 18   Ht 5' (1.524 m)   Wt 150 lb (68 kg)   SpO2 95%   BMI 29.29 kg/m²    Physical Exam  Vitals and nursing note reviewed. Constitutional:       General: She is awake. She is not in acute distress. Appearance: Normal appearance. She is well-developed. She is obese. She is ill-appearing. Interventions: Nasal cannula in place. Comments: 3L NC   HENT:      Head: Normocephalic and atraumatic. No right periorbital erythema or left periorbital erythema. Jaw: There is normal jaw occlusion. Right Ear: External ear normal. Decreased hearing noted. No laceration. Left Ear: External ear normal. Decreased hearing noted. No laceration. Nose: Nose normal. No nasal deformity, laceration, congestion or rhinorrhea. Mouth/Throat:      Lips: Pink. No lesions. Mouth: Mucous membranes are dry. No injury or angioedema. Eyes:      General: Lids are normal. Vision grossly intact. Gaze aligned appropriately. No scleral icterus. Right eye: No discharge. Left eye: No discharge. Extraocular Movements: Extraocular movements intact. Conjunctiva/sclera: Conjunctivae normal.      Pupils: Pupils are equal, round, and reactive to light. Cardiovascular:      Rate and Rhythm: Normal rate and regular rhythm. Pulses:           Dorsalis pedis pulses are 2+ on the right side and 2+ on the left side. Heart sounds: Normal heart sounds.    Pulmonary:      Effort: Pulmonary effort is normal. No respiratory distress. Breath sounds: Decreased air movement present. Decreased breath sounds present. No wheezing or rhonchi. Comments: NC 3L oxygen  Abdominal:      General: Bowel sounds are normal. There is no distension. Palpations: Abdomen is rigid. Tenderness: There is no abdominal tenderness. There is no guarding or rebound. Comments: RLQ rigid   Musculoskeletal:      Cervical back: Normal range of motion. No edema, erythema or signs of trauma. No pain with movement. Normal range of motion. Right lower leg: No edema. Left lower leg: No edema. Skin:     General: Skin is warm and dry. Capillary Refill: Capillary refill takes less than 2 seconds. Coloration: Skin is pale. Findings: Bruising and erythema (redness coccyx R/T diarrhea) present. No burn, rash or wound. Nails: There is no clubbing. Neurological:      General: No focal deficit present. Mental Status: She is alert, oriented to person, place, and time and easily aroused. Mental status is at baseline. Cranial Nerves: No facial asymmetry. Psychiatric:         Attention and Perception: Attention and perception normal.         Mood and Affect: Mood and affect normal.         Speech: Speech normal.         Behavior: Behavior normal. Behavior is cooperative. Thought Content: Thought content normal. Thought content does not include homicidal or suicidal ideation. Cognition and Memory: Cognition normal.         Judgment: Judgment normal.       Allergies:       Allergies   Allergen Reactions    Latex     Tape Flaco Gums Tape]        Medications:      Current Facility-Administered Medications   Medication Dose Route Frequency Provider Last Rate Last Admin    meropenem (MERREM) 1,000 mg in sodium chloride 0.9 % 100 mL IVPB (mini-bag)  1,000 mg IntraVENous Q8H Lele MARIEE Sedar, DO   Stopped at 01/06/23 0540    lactated ringers infusion   IntraVENous Continuous Rosita Lionel, DO 75 mL/hr at 01/06/23 0225 New Bag at 01/06/23 0225    citalopram (CELEXA) tablet 20 mg  20 mg Oral Daily Rosita Lionel, DO        digoxin (LANOXIN) tablet 125 mcg  125 mcg Oral Daily Rosita Lionel, DO        lactobacillus acidophilus [facility]) 2 tablet  2 tablet Oral Daily Rosita Lionel, DO        meclizine (ANTIVERT) tablet 25 mg  25 mg Oral Q6H PRN Rosita Lionel, DO        therapeutic multivitamin-minerals 1 tablet  1 tablet Oral Daily Rosita Lionel, DO        pantoprazole (PROTONIX) tablet 40 mg  40 mg Oral QAM AC Rosita Lionel, DO   40 mg at 01/06/23 0506    levothyroxine (SYNTHROID) tablet 88 mcg  88 mcg Oral QAM AC Rosita Lionel, DO   88 mcg at 01/06/23 7504    vitamin B-12 (CYANOCOBALAMIN) tablet 1,000 mcg  1,000 mcg Oral Daily Rosita Lionel, DO        vitamin D (CHOLECALCIFEROL) tablet 2,000 Units  2,000 Units Oral Dinner Rosita Lionel, DO        sacubitril-valsartan (ENTRESTO) 24-26 MG per tablet 1 tablet  1 tablet Oral BID Rosita Lionel, DO        carvedilol (COREG) tablet 6.25 mg  6.25 mg Oral BID Rosita Lionel, DO           History:       PMHx:  Past Medical History:   Diagnosis Date    Ascending aortic aneurysm 6/23/2017    Charcot Arleen Tooth muscular atrophy     CHF (congestive heart failure) (HCC)     Chronic diastolic CHF (congestive heart failure) ([facility] [address] 4/1/2022    Depression     Descending aortic aneurysm ([facility] [address] 6/23/2017    Essential hypertension 2/16/2018    Headache     HTN (hypertension)     Impaired mobility and activities of daily living     Lumbar stenosis with neurogenic claudication     Myelopathy [facility])     Osteoarthritis        PSHx:  Past Surgical History:   Procedure Laterality Date    COLONOSCOPY N/A 1/4/2023    COLONOSCOPY DIAGNOSTIC performed by Ale Mcdonnell MD at [facility]    IR INS PICC VAD W SQ PORT GREATER THAN 5  9/23/2022    IR INS PICC VAD W SQ PORT GREATER THAN 5 9/23/2022 [facility] PROCEDURE    JOINT REPLACEMENT Bilateral     knees    OTHER SURGICAL HISTORY Right 07/21/2022    cat scan guided abdominal mass aspiration with drain placement by Dr. Jayshree Bush Left 02/25/2021    LEFT PLEURAL CATHETER INSERTION performed by Wendy Cr MD at [place] [address] Left 01/29/2021    total of 755 cc removed per Dr Joy Alas specimen sent to lab       Social Hx:  Social History     Socioeconomic History    Marital status:       Spouse name: None    Number of children: 2    Years of education: None    Highest education level: None   Tobacco Use    Smoking status: Never    Smokeless tobacco: Never   Vaping Use    Vaping Use: Never used   Substance and Sexual Activity    Alcohol use: No     Alcohol/week: 0.0 standard drinks    Drug use: No    Sexual activity: Not Currently   Social History Narrative    , Lives With: Alone, son lives down the street, dtr is in the area    Type of Home: [address]: One level    Home Access: Stairs to enter with rails- Number of Steps: 2- Rails: Both    Bathroom Shower/Tub: Tub/Shower unit, Bathroom Equipment: Grab bars in shower, Shower chair    Home Equipment: Rolling walker, Cane(Pt infrequemtly uses DME for ambulation and prefers to furniture walk in home)    ADL Assistance: Independent, [address]: Independent    Homemaking Responsibilities: Yes    Ambulation Assistance: Independent, Transfer Assistance: Independent    Additional Comments: Son stops over 2 times daily           Family Hx:  Family History   Problem Relation Age of Onset    Arthritis Mother     Arthritis Father     High Blood Pressure Father     Colon Cancer Brother        LABS: Reviewed     CBC:  Lab Results   Component Value Date/Time    WBC 15.9 01/06/2023 05:20 AM    RBC 4.14 01/06/2023 05:20 AM    HGB 11.4 01/06/2023 05:20 AM    HCT 34.3 01/06/2023 05:20 AM    MCV 82.9 01/06/2023 05:20 AM    MCH 27.5 01/06/2023 05:20 AM    MCHC 33.2 01/06/2023 05:20 AM    RDW 16.5 01/06/2023 05:20 AM     01/06/2023 05:20 AM     CBC with Differential:   Lab Results   Component Value Date/Time    WBC 15.9 01/06/2023 05:20 AM    RBC 4.14 01/06/2023 05:20 AM    HGB 11.4 01/06/2023 05:20 AM    HCT 34.3 01/06/2023 05:20 AM     01/06/2023 05:20 AM    MCV 82.9 01/06/2023 05:20 AM    MCH 27.5 01/06/2023 05:20 AM    MCHC 33.2 01/06/2023 05:20 AM    RDW 16.5 01/06/2023 05:20 AM    BANDSPCT 2 07/24/2022 05:00 AM    METASPCT 2 07/24/2022 05:00 AM    LYMPHOPCT 5.1 01/06/2023 05:20 AM    MONOPCT 7.2 01/06/2023 05:20 AM    BASOPCT 0.2 01/06/2023 05:20 AM    MONOSABS 1.1 01/06/2023 05:20 AM    LYMPHSABS 0.8 01/06/2023 05:20 AM    EOSABS 0.0 01/06/2023 05:20 AM    BASOSABS 0.0 01/06/2023 05:20 AM     CMP:    Lab Results   Component Value Date/Time     01/06/2023 05:20 AM    K 3.3 01/06/2023 05:20 AM     01/06/2023 05:20 AM    CO2 23 01/06/2023 05:20 AM    BUN 21 01/06/2023 05:20 AM    CREATININE 0.57 01/06/2023 05:20 AM    GFRAA >60.0 10/11/2022 10:30 AM    LABGLOM >60.0 01/06/2023 05:20 AM    GLUCOSE 97 01/06/2023 05:20 AM    PROT 6.1 01/06/2023 05:20 AM    LABALBU 2.6 01/06/2023 05:20 AM    CALCIUM 9.3 01/06/2023 05:20 AM    BILITOT 0.7 01/06/2023 05:20 AM    ALKPHOS 54 01/06/2023 05:20 AM    AST 25 01/06/2023 05:20 AM    ALT 13 01/06/2023 05:20 AM     BMP:    Lab Results   Component Value Date/Time     01/06/2023 05:20 AM    K 3.3 01/06/2023 05:20 AM     01/06/2023 05:20 AM    CO2 23 01/06/2023 05:20 AM    BUN 21 01/06/2023 05:20 AM    LABALBU 2.6 01/06/2023 05:20 AM    CREATININE 0.57 01/06/2023 05:20 AM    CALCIUM 9.3 01/06/2023 05:20 AM    GFRAA >60.0 10/11/2022 10:30 AM    LABGLOM >60.0 01/06/2023 05:20 AM    GLUCOSE 97 01/06/2023 05:20 AM     TSH:   Lab Results   Component Value Date/Time    TSH 2.690 03/17/2022 01:56 PM     Urinalysis:   Lab Results   Component Value Date/Time    NITRU Negative 01/04/2023 10:14 PM    WBCUA 0-2 01/04/2023 10:14 PM    BACTERIA Negative 01/04/2023 10:14 PM    RBCUA 3-5 01/04/2023 10:14 PM    BLOODU TRACE 01/04/2023 10:14 PM    SPECGRAV 1.022 01/04/2023 10:14 PM    GLUCOSEU Negative 01/04/2023 10:14 PM     Albumin:   Lab Results   Component Value Date    LABALBU 2.6 (L) 01/06/2023     Liver:   Lab Results   Component Value Date    ALT 13 01/06/2023    AST 25 01/06/2023    ALKPHOS 54 01/06/2023    BILITOT 0.7 01/06/2023     Weight Trends  Wt Readings from Last 10 Encounters:   01/04/23 150 lb (68 kg)   01/04/23 150 lb (68 kg)   12/29/22 150 lb (68 kg)   11/23/22 150 lb 6 oz (68.2 kg)   11/16/22 150 lb 4.8 oz (68.2 kg)   11/02/22 160 lb (72.6 kg)   10/24/22 155 lb (70.3 kg)   10/03/22 155 lb (70.3 kg)   09/19/22 155 lb (70.3 kg)   09/14/22 155 lb (70.3 kg)          FUNCTIONAL ADL´S:     Independent: [ x ] Eating, [   ] Dressing, [   ] Transferring, [   ] Delpha Harden, [   ] Judythe Pierce, [ x ] Continence  Dependent   : [  ] Eating, [   ] Dressing, [   ] Transferring, [   ] Delpha Harden, [   ] Bathing, [   ] Continence  W. assistant : [  ] Eating, [ x ] Dressing, [ x ] Transferring, [ x ] Toileting, [ x ] Bathing, [   ] Continence    Radiology: Reviewed      CT HEAD WO CONTRAST    Result Date: 1/4/2023  EXAMINATION: CT OF THE HEAD WITHOUT CONTRAST  1/4/2023 7:27 pm TECHNIQUE: CT of the head was performed without the administration of intravenous contrast. Automated exposure control, iterative reconstruction, and/or weight based adjustment of the mA/kV was utilized to reduce the radiation dose to as low as reasonably achievable. COMPARISON: None. 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 ->No Decision Support Exception - unselect if not a suspected or confirmed emergency medical condition->Emergency Medical Condition (MA) What reading provider will be dictating this exam?->CRC FINDINGS: BRAIN/VENTRICLES: There is no acute intracranial hemorrhage, mass effect or midline shift. No abnormal extra-axial fluid collection. The gray-white differentiation is maintained without evidence of an acute infarct. There is prominence of the ventricles and sulci due to global parenchymal volume loss. There are nonspecific areas of hypoattenuation within the periventricular and subcortical white matter, which likely represent chronic microvascular ischemic change. ORBITS: The visualized portion of the orbits demonstrate no acute abnormality. SINUSES: The visualized paranasal sinuses and mastoid air cells demonstrate no acute abnormality. There is mild mucosal thickening in the maxillary sinuses. SOFT TISSUES/SKULL: No acute abnormality of the visualized skull or soft tissues. No acute intracranial abnormality. CT ABDOMEN PELVIS W IV CONTRAST Additional Contrast? None    Result Date: 1/5/2023  EXAMINATION: CT OF THE ABDOMEN AND PELVIS WITH CONTRAST 1/5/2023 1:19 am TECHNIQUE: CT of the abdomen and pelvis was performed with the administration of intravenous contrast. Multiplanar reformatted images are provided for review. Automated exposure control, iterative reconstruction, and/or weight based adjustment of the mA/kV was utilized to reduce the radiation dose to as low as reasonably achievable. COMPARISON: 12/06/2022 HISTORY: ORDERING SYSTEM PROVIDED HISTORY: abd pain and fever post colonoscopy today TECHNOLOGIST PROVIDED HISTORY: Reason for exam:->abd pain and fever post colonoscopy today Additional Contrast?->None What reading provider will be dictating this exam?->CRC FINDINGS: Lower Chest: Atelectatic changes seen lung bases bilaterally. Enlargement identified of the ascending thoracic aorta as well as the descending thoracic aorta. Ascending thoracic aorta measures 5.6 x 5.7 cm with descending thoracic aorta measuring 4.3 x 4.3 cm.  Organs: Liver is homogeneous in appearance. Low-attenuation focus seen inferiorly within the right lobe too small to characterize. The gallbladder is contracted. No evidence of stones or wall thickening. The pancreas is homogeneous. The spleen is heterogeneous and unremarkable. Both adrenal glands are without evidence of abnormality. Symmetric enhancement of the renal parenchyma. No stones or distension seen in the renal collecting system. GI/Bowel: Stomach is unremarkable in appearance. No wall thickening. The small bowel is within normal limits. No mucosal abnormality. There is a large complex process seen in the right lower quadrant measuring 15.0 x 9.6 cm most compatible with an abscess. This process is increased in size when compared to the prior study. New presence of gas indicating communication with the bowel. Findings may reflect communication with the right sided bowel and cecum. Large carotic mass with super infection is possible. The loops of distal ileum within the right lower quadrant appear to be involved. No evidence of small-bowel obstruction. Pelvis: The bladder is mildly distended. No gross mass or lesion. Simple cystic structure identified within the left adnexa measuring up to 9.1 cm. Recommend follow-up ultrasound in 6-12 months. Gynecologic consultation can be performed. Peritoneum/Retroperitoneum: There is no abdominal retroperitoneal lymphadenopathy. No pelvic adenopathy. Vascular calcifications seen within the abdominal aorta and iliac vessels. Bones/Soft Tissues: Multilevel degenerative changes seen within the spine. Small right inguinal hernia containing fat only. Slight increase seen in size of the complex process in the right lower quadrant with new presence of gas indicating communication with the bowel. The process appears to be centered around the cecum with involvement as well seen in the ascending colon and several distal small bowel loops.   Findings concerning for abscess or necrotic mass with possible superimposed infection. Stable 9.1 cm cystic lesion in the left adnexa in which 6-12 month follow-up can be performed as well as gynecologic consultation as clinically indicated. XR CHEST PORTABLE    Result Date: 1/4/2023  EXAMINATION: ONE XRAY VIEW OF THE CHEST 1/4/2023 7:16 pm COMPARISON: None. HISTORY: ORDERING SYSTEM PROVIDED HISTORY: fever TECHNOLOGIST PROVIDED HISTORY: Reason for exam:->fever What reading provider will be dictating this exam?->CRC FINDINGS: There is cardiomegaly. There is vascular congestion. There is tortuous dilated aortic arch and descending thoracic aorta. There is patchy infiltrates and pleural effusion in the left lung base. Cardiomegaly with the dilated aortic arch and descending thoracic aorta. Consider CT for better assessment. Patchy left basilar infiltrate and effusion likely pneumonia. Colonoscopy    Result Date: 1/4/2023  Site: [address] [place] [address] Patient Name: Radha Rubio Procedure Date: 1/4/2023 MRN: [id number] YOB: 1932 Gender: Female Attending MD: Maxwell Frias Indications:        -  Abnormal CT of the GI tract        - high CEA. Medications:        -  See the Anesthesia note for documentation of the administered medications Complications:        -  No immediate complications. Estimated Blood Loss:        -  Estimated blood loss was minimal. Procedure:        - The Colonoscope was introduced through the anus and advanced to the cecum,           identified by appendiceal orifice and ileocecal valve. -  The colonoscopy was performed without difficulty. -  The patient tolerated the procedure well. -  The quality of the bowel preparation was good. -  The ileocecal valve, appendiceal orifice, and rectum were photographed. Findings:        -  A 12 mm polyp was found in the rectum. The polyp was pedunculated. The           polyp was removed with a hot snare.    Resection and retrieval were complete. -  A 10 mm polyp was found in the descending colon. The polyp was sessile. The polyp was removed with a cold snare. Resection and retrieval were           complete. -  A 7 mm polyp was found in the hepatic flexure. The polyp was sessile. The           polyp was removed with a cold snare. Resection and retrieval were complete. -  A frond-like/villous, fungating, infiltrative, friable and ulcerated           partially obstructing large mass was found in the ascending colon. The mass           was partially circumferential (involving two-thirds of the lumen           circumference). The mass measured 6 cm (in length). This mass was extending           into the hepatic flexure. No spontaneous bleeding was present. Biopsies were           taken with a cold forceps for histology. -  A frond-like/villous, ulcerated, infiltrative ,friable  and polypoid           non-obstructing large mass was found in the cecum. The mass was partially           circumferential (involving one-half of the lumen circumference). No bleeding           was present. Biopsies were taken with a cold forceps for histology. Impression:        -  One 12 mm polyp in the rectum, removed with a hot snare. Resected and           retrieved. -  One 10 mm polyp in the descending colon, removed with a cold snare. Resected and retrieved. -  One 7 mm polyp at the hepatic flexure, removed with a cold snare. Resected           and retrieved. 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 Biopsied.        -  Likely malignant tumor in the cecum. Biopsied. Recommendation:        -  Return to my office as previously scheduled. - surgical consult after discussing with patient and family.  Procedure Code(s):        - [id number], Colonoscopy, flexible; with removal of tumor(s), polyp(s), or other           lesion(s) by snare technique        - [id number], Colonoscopy, flexible; with biopsy, single or multiple Diagnosis Code(s):        - R93.3, Abnormal findings on diagnostic imaging of other parts of digestive           tract        - D12.8, Benign neoplasm of rectum        - D12.4, Benign neoplasm of descending colon        - D12.3, Benign neoplasm of transverse colon (hepatic flexure or splenic           flexure)        - D49.0, Neoplasm of unspecified behavior of digestive system        - K56.690, Other partial intestinal obstruction       CPT(R) - 2022 copyright [employer]. All Rights Reserved. The CPT codes, CCI edits and ICD codes generated are intended as suggestions       and were generated based on input data. These codes are preliminary and upon        review may be revised to meet current compliance and payer requirements. The provider is responsible for the final determination of appropriate codes,       and modifiers. Scope Withdrawal Time:       00:15:21 Signature Name: Letty Farah MD Signature Statement: This document has been electronically signed. Note Initiated On:1/4/2023 Signature Date:1/4/2023 12:30 PM       Assessment and plan:  Palliative care encounter: Spoke with patient and patient's son Mira and updated them of current condition and answered any questions. Patient and patient's son are considering surgical intervention at [facility] if they can perform the surgery without end result of colostomy. No clear choice has been made at this time. Change CODE STATUS to limited code as patient does not want intubation \"tubes\". Palliative care will continue to follow and answer questions as they arise.   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 However, this is not currently consistent with patient's goals of care. But if the patient or patient's family decline intervention or treatment for a sending colon mass they may choose to go the hospice route. I have discussed/reviewed the patient's case with nursing staff.    -Advance Care Planning  Discussed goals of care with patient. Explained in extensive detail nuances between full code, DNR CCA and DNR CC. Patient has made the decision to be LIMITED CODE WITH NO INTUBATION. Patient has previously designated son Mira as power of . -Goals of Care Discussion:  Disease process and goals of treatment were discussed in basic terms. Xiomarai's goal is to optimize available comfort care measures and continue with current plan of care while considering aggressive surgical interventions. We discussed the palliative care philosophy in light of those goals. We discussed all care options contingent on treatment response and QOL. Much active listening, presence, and emotional support were given. -Patient is currently being treated for multiple medical conditions by other providers  Large right lower quadrant fungating mass  Pneumonia of left lower lobe due to infectious organism  Generalized weakness  Dehydration  Acute non-intractable headache, unspecified headache type  Ascending colon mass  Hypertension  Atrial fibrillation  GERD  Hypothyroid disease  Diarrhea    MDM: High    Electronically signed by ADRIENNE Fang CNP on 1/6/2023 at 9:37 AM    Collaborating physician: Dr. Roc Hill     Please note this report is partially produced by using speech recognition hardware. It may contain errors related to the system, including grammar, punctuation and spelling as well as words and phrases that may seem inaccurate.   For any questions or concerns feel free to contact me for clarification. Thanks for the opportunity you have allowed us to provide palliative care to [address]. We will be in touch as care progresses. Please feel free to reach out to us should you have any questions or requests.

## 2023-01-06 NOTE — PROGRESS NOTES
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     Weakness 2023    Adenomatous polyp of descending colon 2023    Adenomatous polyp of colon 2023    Impaired mobility and ADLs 2022    Palliative care encounter 2022    Advanced care planning/counseling discussion 2022    Goals of care, counseling/discussion     Acute diastolic HF (heart failure) ([address] 2022    CHF (congestive heart failure), NYHA class I, acute on chronic, combined ([address] 2022    Difficulty in walking 2022    Muscle weakness (generalized) 2022    Gastro-esophageal reflux disease without esophagitis 2022    Intracardiac thrombosis, 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    Change or removal of drains 2022    Adnexal mass 2022    Intra-abdominal abscess ([address] 2022    Aortic thrombus ([address] 2022    Hx of drainage of abscess 2022    Abdominal pain 2022    Streptococcus viridans infection 2022    Colonic mass 2022    Right lower quadrant abdominal pain 2022    Hyponatremia 2022    Hypokalemia 2022    Anemia 2022    CHF (congestive heart failure) ([address] 2022    Hypothyroid 2022    Central retinal vein occlusion, left eye, stable 2021    Lumbar stenosis with neurogenic claudication 2016    Chronic diastolic CHF (congestive heart failure) ([facility] 2022 Acute cystitis with hematuria 02/21/2021    Acute on chronic combined systolic (congestive) and diastolic (congestive) heart failure (HCC) 02/09/2021    Pleural effusion 02/08/2021    Hypoxia     Acute pulmonary edema (HCC)     Gait abnormality 01/27/2021    Acute on chronic combined systolic and diastolic CHF (congestive heart failure) ([address] 01/25/2021    Essential hypertension 02/16/2018    Shortness of breath     Vertigo     Thoracic aortic aneurysm without rupture 06/23/2017    Descending aortic aneurysm ([address] 06/23/2017    Osteoarthritis     Myelopathy ([address]     Headache     Depression     Acquired scoliosis 06/02/2016    Osteoporosis 06/02/2016    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    Excess weight 06/02/2016    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/02/2016        Past Medical History:   Diagnosis Date    Ascending aortic aneurysm 6/23/2017    Charcot Arleen Tooth muscular atrophy     CHF (congestive heart failure) (HCC)     Chronic diastolic CHF (congestive heart failure) ([address] 4/1/2022    Depression     Descending aortic aneurysm ([address] 6/23/2017    Essential hypertension 2/16/2018    Headache     HTN (hypertension)     Impaired mobility and activities of daily living     Lumbar stenosis with neurogenic claudication     Myelopathy ([address]     Osteoarthritis      Past Surgical History:   Procedure Laterality Date    COLONOSCOPY N/A 1/4/2023    COLONOSCOPY DIAGNOSTIC performed by Penny Castañeda MD at [facility]    IR INS PICC VAD W SQ PORT GREATER THAN 5  9/23/2022    IR INS PICC VAD W SQ PORT GREATER THAN 5 9/23/2022 [facility] PROCEDURE    JOINT REPLACEMENT Bilateral     knees    OTHER SURGICAL HISTORY Right 07/21/2022    cat scan guided abdominal mass aspiration with drain placement by Dr. Emi Mensah Left 02/25/2021    LEFT PLEURAL CATHETER INSERTION performed by Ming Akers MD at [place] [address] Left 01/29/2021    total of 755 cc removed per Dr Sundya Ponce specimen sent to lab        Restrictions  Restrictions/Precautions: Fall Risk              Safety Devices: Safety Devices  Type of Devices: All fall risk precautions in place;Call light within reach; Left in bed     Patient's date of birth confirmed: Pt verbally unable, verified via wrist band    General:       Subjective:Pt pleasant and cooperative. Pt speaks only Ukrainian and minimal english. Pt follows visual cues with demonstration. Pain at start of treatment: No    Pain at end of treatment: No    Location:   Nursing notified: Not Applicable  RN:   Intervention: Repositioned    Prior Level of Function:  Social/Functional History  Lives With: Alone  Type of Home: House  Home Layout: One level  Home Access: Stairs to enter with rails  Entrance Stairs - Number of Steps: 2  Entrance Stairs - Rails: Both  Bathroom Shower/Tub: Tub/Shower unit  Bathroom Equipment: Grab bars in [address] [place], [place] [address]vard: Wallene Lino, Walker, rolling (Bilat AFO)  ADL Assistance: [address]:  (Son assists with shopping, family checks on daily)  Ambulation Assistance: Independent (ww)  Transfer Assistance: Independent  Additional Comments: information gathered from [facility] stay in Nov 2022. Pt had been at an indep level when discharged    OBJECTIVE:     Orientation Status:       Observation:  Observation/Palpation  Posture: Fair  Observation: flexed posture    Cognition Status:  Cognition  Cognition Comment: follows one step commands with visual cues and demonstration    Perception Status:       Vision and Hearing Status:  Hearing  Hearing: Within functional limits   Vision - Basic Assessment  Prior Vision: No visual deficits  Visual History: No significant visual history  Patient Visual Report: No visual complaint reported. Visual Field Cut: No  Oculo Motor Control:  WNL    GROSS ASSESSMENT AROM/PROM:  AROM: Within functional limits       ROM:   LUE AROM (degrees)  LUE AROM : WFL  Left Hand AROM (degrees)  Left Hand AROM: WFL  RUE AROM (degrees)  RUE AROM : WFL  Right Hand AROM (degrees)  Right Hand AROM: WFL    UE STRENGTH:  Strength:  (4/5 bilateral UE strength)    UE COORDINATION:  Coordination: Within functional limits    UE TONE:  Tone: Normal    UE SENSATION:  Sensation: Intact    Hand Dominance:  Hand Dominance  Hand Dominance: Right    ADL Status:  ADL  Feeding: Unable to assess(comment)  Grooming: Stand by assistance; Increased time to complete  UE Bathing: Contact guard assistance; Increased time to complete  LE Bathing: Moderate assistance; Increased time to complete  UE Dressing: Minimal assistance; Increased time to complete  LE Dressing: Moderate assistance; Increased time to complete  Toileting: Maximum assistance; Increased time to complete  Toilet Transfers  Toilet - Technique: Stand step  Equipment Used: Standard bedside commode  Toilet Transfer: Moderate assistance    Functional Mobility:    Transfers  Sit to stand: Moderate assistance  Stand to sit: Moderate assistance    Bed Mobility  Bed mobility  Bridging: Minimal assistance  Rolling to Left: Minimal assistance  Supine to Sit: Moderate assistance  Sit to Supine:  Moderate assistance  Scooting: Minimal assistance    Seated and Standing Balance:  Balance  Sitting:  (supervision)  Standing:  (Min/Mod A)    Functional Endurance:  Activity Tolerance  Activity Tolerance: Treatment limited secondary to medical complications (free text)    D/C Recommendations:  OT D/C RECOMMENDATIONS  REQUIRES OT FOLLOW-UP: Yes    Equipment Recommendations:       OT Education:        OT Follow Up:   OT D/C RECOMMENDATIONS  REQUIRES OT FOLLOW-UP: Yes       Assessment/Discharge Disposition:     Performance deficits / Impairments: Decreased functional mobility , Decreased endurance, Decreased ADL status, Decreased balance, Decreased strength, Decreased high-level IADLs  Prognosis: Fair  Discharge Recommendations: Continue to assess pending progress  Decision Making: Medium Complexity  History: 8 complexities  Exam: 6 deficits  Assistance / Modification: Mod A    AMPAC (Six Click) Self care Score    How much help for putting on and taking off regular lower body clothing?: A Lot  How much help for Bathing?: A Lot  How much help for Toileting?: A Lot  How much help for putting on and taking off regular upper body clothing?: A Little  How much help for taking care of personal grooming?: None  How much help for eating meals?: None  AM-[facility] Inpatient Daily Activity Raw Score: 17  AM-[facility]AC Inpatient ADL T-Scale Score : 37.26  ADL Inpatient CMS 0-100% Score: 50.11    Therapy key for assistance levels -   Independent/Mod I = Pt. is able to perform task with no assistance but may require a device   Stand by assistance = Pt. does not perform task at an independent level but does not need physical assistance, requires verbal cues  Minimal, Moderate, Maximal Assistance = Pt. requires physical assistance (25%, 50%, 75% assist from helper) for task but is able to actively participate in task   Dependent = Pt. requires total assistance with task and is not able to actively participate with task completion     Plan:  Occupational Therapy Plan  Times Per Week: 1-4x/wk  Current Treatment Recommendations: Strengthening, Balance training, Functional mobility training, Endurance training, Self-Care / ADL, Neuromuscular re-education    Goals:   Patient will:    - Improve functional endurance to tolerate/complete 15-20 mins of ADL's  - Be SBA in UB ADLs   - Be Min A in LB ADLs  - Be SBA in ADL transfers without LOB  - Be Supervision in toileting tasks  - Improve bilateral UE strength and endurance to Fair in order to participate in self-care activities as projected.     Patient Goal: Patient goals : Not stated at this time     Discussed and agreed upon: Yes Comments:       Therapy Time:   Individual   Time In 0930   Time Out 1005   Minutes 35          Eval: 35 minutes     Electronically signed by:    Bridgett Monaco OSMANI Phillip/L,   0/4/1480, 10:20 AM Electronically signed by OSMANI Fontanez/L on 8/4/07 at 10:21 AM EST

## 2023-01-06 NOTE — CARE COORDINATION
SPOKE W/SON TO ASSESS NEEDS AND DISCUSS DISCHARGE PLAN. HE FEELS SHE NEEDS TO GO SOMEWHERE FOR THERAPY AND THINKS [facility] WOULD BE IDEAL. SHE HAS BEEN THERE BEFORE AND DID WELL PER SON. UPDATED DR. Radha Wells W/PT/SON'S CHOICE. WILL FOLLOW. SON DID NOT WISH TO PICK AN ALTERNATIVE REHAB CHOICE AT THIS TIME.

## 2023-01-06 NOTE — PLAN OF CARE
Problem: Confusion  Goal: Confusion, delirium, dementia, or psychosis is improved or at baseline  Description: INTERVENTIONS:  1. Assess for possible contributors to thought disturbance, including medications, impaired vision or hearing, underlying metabolic abnormalities, dehydration, psychiatric diagnoses, and notify attending LIP  2. [place] high risk fall precautions, as indicated  3. Provide frequent short contacts to provide reality reorientation, refocusing and direction  4. Decrease environmental stimuli, including noise as appropriate  5. Monitor and intervene to maintain adequate nutrition, hydration, elimination, sleep and activity  6. If unable to ensure safety without constant attention obtain sitter and review sitter guidelines with assigned personnel  7. Initiate Psychosocial CNS and Spiritual Care consult, as indicated  1/6/2023 1212 by Brandin Chatman RN  Outcome: Progressing  1/6/2023 0016 by Mustapha Bell RN  Outcome: Progressing     Problem: Skin/Tissue Integrity  Goal: Absence of new skin breakdown  Description: 1. Monitor for areas of redness and/or skin breakdown  2. Assess vascular access sites hourly  3. Every 4-6 hours minimum:  Change oxygen saturation probe site  4. Every 4-6 hours:  If on nasal continuous positive airway pressure, respiratory therapy assess nares and determine need for appliance change or resting period.   1/6/2023 1212 by Brandin Chatman RN  Outcome: Progressing  1/6/2023 0016 by Mustapha Bell RN  Outcome: Progressing     Problem: Safety - Adult  Goal: Free from fall injury  1/6/2023 1212 by Brandin Chatman RN  Outcome: Progressing  1/6/2023 0016 by Mustapha Bell RN  Outcome: Progressing

## 2023-01-06 NOTE — PROGRESS NOTES
Physical Therapy Med Surg Initial Assessment  Fac[facility]/Department: [facility]  [facility]       NAME: Marilou Rodriguez  : 1932 (80 y.o.)  MRN: [id number]  CODE STATUS: Full Code    Date of Service: 2023    Patient Diagnosis(es): Dehydration [E86.0]  Weakness [R53.1]  Generalized weakness [R53.1]  Acute nonintractable headache, unspecified headache type [R51.9]  Pneumonia of left lower lobe due to infectious organism [J18.9]  Colonic mass [K63.89]   Chief Complaint   Patient presents with    Headache     Headache and weakness since colonoscopy this morning.      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     Weakness 2023    Adenomatous polyp of descending colon 2023    Adenomatous polyp of colon 2023    Impaired mobility and ADLs 2022    Palliative care encounter 2022    Advanced care planning/counseling discussion 2022    Goals of care, counseling/discussion     Acute diastolic HF (heart failure) ([address] 2022    CHF (congestive heart failure), NYHA class I, acute on chronic, combined ([address] 2022    Difficulty in walking 2022    Muscle weakness (generalized) 2022    Gastro-esophageal reflux disease without esophagitis 2022    Intracardiac thrombosis, 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    Change or removal of drains 2022    Adnexal mass 2022    Intra-abdominal abscess ([address] 2022    Aortic thrombus ([address] 2022    Hx of drainage of abscess 2022    Abdominal pain 2022    Streptococcus viridans infection 2022    Colonic mass 2022    Right lower quadrant abdominal pain 2022    Hyponatremia 2022    Hypokalemia 2022    Anemia 2022    CHF (congestive heart failure) ([address] 2022    Hypothyroid 2022    Central retinal vein occlusion, left eye, stable 04/12/2021    Lumbar stenosis with neurogenic claudication 06/02/2016    Chronic diastolic CHF (congestive heart failure) ([address] 04/01/2022    Acute cystitis with hematuria 02/21/2021    Acute on chronic combined systolic (congestive) and diastolic (congestive) heart failure ([address] 02/09/2021    Pleural effusion 02/08/2021    Hypoxia     Acute pulmonary edema (HCC)     Gait abnormality 01/27/2021    Acute on chronic combined systolic and diastolic CHF (congestive heart failure) ([address] 01/25/2021    Essential hypertension 02/16/2018    Shortness of breath     Vertigo     Thoracic aortic aneurysm without rupture 06/23/2017    Descending aortic aneurysm ([address] 06/23/2017    Osteoarthritis     Myelopathy ([address]     Headache     Depression     Acquired scoliosis 06/02/2016    Osteoporosis 06/02/2016    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    Excess weight 06/02/2016    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/02/2016        Past Medical History:   Diagnosis Date    Ascending aortic aneurysm 6/23/2017    Charcot Arleen Tooth muscular atrophy     CHF (congestive heart failure) (HCC)     Chronic diastolic CHF (congestive heart failure) ([address] 4/1/2022    Depression     Descending aortic aneurysm ([address] 6/23/2017    Essential hypertension 2/16/2018    Headache     HTN (hypertension)     Impaired mobility and activities of daily living     Lumbar stenosis with neurogenic claudication     Myelopathy ([address]     Osteoarthritis      Past Surgical History:   Procedure Laterality Date    COLONOSCOPY N/A 1/4/2023    COLONOSCOPY DIAGNOSTIC performed by Jessee Sepulveda MD at [facility]    IR INS PICC VAD W SQ PORT GREATER THAN 5  9/23/2022    IR INS PICC VAD W SQ PORT GREATER THAN 5 9/23/2022 [facility] SPECIAL PROCEDURE    JOINT REPLACEMENT Bilateral     knees    OTHER SURGICAL HISTORY Right 07/21/2022    cat scan guided abdominal mass aspiration with drain placement by Dr. Ceci Solo 02/25/2021    [facility]    total of 755 cc removed per Dr Eduar Machado specimen sent to lab       Chart Reviewed: Yes  Family / Caregiver Present: No  Other (Comment): Pt known to this therapist. This pt primarily speaks a [place] dialect that is not available for her to utilize. Pt understands simple English combined with demonstration. Restrictions:  Restrictions/Precautions: Fall Risk     SUBJECTIVE:        Pain  Pain: denies pre and post pain    Prior Level of Function:  Social/Functional History  Lives With: Alone  Type of Home: House  Home Layout: One level  Home Access: Stairs to enter with rails  Entrance Stairs - Number of Steps: 2  Entrance Stairs - Rails: Both  Bathroom Shower/Tub: Tub/Shower unit  Bathroom Equipment: Grab bars in shower  Home Equipment: [place], Walker, rolling (Bilat AFO)  ADL Assistance: Independent  Homemaking Assistance:  (Son assists with shopping, family checks on daily)  Ambulation Assistance: Independent (ww)  Transfer Assistance: Independent  Additional Comments: information gathered from Rehab hospital stay in Nov 2022. Pt had been at an indep level when discharged  OBJECTIVE:        Cognition:  Orientation Level: Oriented to person  Follows Commands: Impaired  Cognition Comment: follows one step commands with visual cues and demonstration    Observation/Palpation  Observation: flexed posture - pt initially in distress due to incontenance.     ROM:  RLE PROM: WFL  LLE PROM: WFL    Strength:  Strength RLE  Comment: 3+/5 except 0/5 df  Strength LLE  Comment: 3+/5 except 0/5 df  Strength Other  Other: poor abdominal strength    Neuro:  Balance  Posture: Fair  Sitting - Static: Fair (mild post lean but no LOB)  Sitting - Dynamic: Fair  Standing - Static: Poor (pt with posterior LOB requiring assistance and ww for recovery)  Standing - Dynamic: Poor                                Bed mobility  Bridging: Minimal assistance  Rolling to Left: Minimal assistance  Supine to Sit: Moderate assistance  Sit to Supine: Moderate assistance  Scooting: Minimal assistance  Bed Mobility Comments: \hob elevated initially    Transfers  Sit to Stand: Moderate Assistance;2 Person Assistance (pt with LOB post requiring lifting assistance intermittently to correct)  Stand to Sit: Moderate Assistance (poor safety awareness of surface proximity)  Bed to Chair: Moderate assistance;2 Person Assistance (to [facility] with ww)    Ambulation  Surface: Level tile  Device: Rolling Walker  Other Apparatus: O2 (no braces in place for this session)  Assistance: Moderate assistance;2 Person assistance  Quality of Gait: posterior LOB, poor walker management, flexed trunk, knees stable  Distance: 5- STEPS X2                   Activity Tolerance  Activity Tolerance: Patient tolerated evaluation without incident  Activity Tolerance Comments: Pt assisted to [facility]    Patient Education  Education Given To: Patient  Education Provided: Role of Therapy;Plan of Care  Education Method: Verbal;Demonstration  Barriers to Learning: Cognition  Education Outcome: Verbalized understanding       ASSESSMENT:   Body Structures, Functions, Activity Limitations Requiring Skilled Therapeutic Intervention: Decreased functional mobility ; Decreased endurance;Decreased balance;Decreased safe awareness;Decreased cognition;Decreased posture  Decision Making: High Complexity  History: high  Exam: high  Clinical Presentation: high    Barriers to Learning: memory         DISCHARGE RECOMMENDATIONS:  Discharge Recommendations: Continue to assess pending progress    Assessment: Pt demonstrates deficits as listed. Pt had been at an indep level of function at home.  Pt would benefit from follow up PT while at this level of care  Requires PT Follow-Up: Yes       PLAN OF CARE:  Physcial Therapy Plan  General Plan: 1 time a day 3-6 times a week  Current Treatment Recommendations: Strengthening, Balance training, Functional mobility training, Transfer training, Gait training, Endurance training, Neuromuscular re-education, Safety education & training, Equipment evaluation, education, & procurement, Patient/Caregiver education & training    Safety Devices  Type of Devices: Call light within reach, Left in bed, Bed alarm in place  Restraints  Restraints Initially in Place: No    Goals:  Short Term Goals  Short Term Goal 1: SBA bed mobility  Short Term Goal 2: SBA sit to stand  Short Term Goal 3: SBA gait with ww 25 feet  Short Term Goal 4: pt able to stand with ww 2 min with SBA    AMPAC (6 CLICK) BASIC MOBILITY  AM-PAC Inpatient Mobility Raw Score : 12     Therapy Time:   Individual   Time In 0930   Time Out 1005   Minutes [address] [place] [address], 01/06/23 at 10:21 AM         Definitions for assistance levels  Independent = pt does not require any physical supervision or assistance from another person for activity completion. Device may be needed.   Stand by assistance = pt requires verbal cues or instructions from another person, close to but not touching, to perform the activity  Minimal assistance= pt performs 75% or more of the activity; assistance is required to complete the activity  Moderate assistance= pt performs 50% of the activity; assistance is required to complete the activity  Maximal assistance = pt performs 25% of the activity; assistance is required to complete the activity  Dependent = pt requires total physical assistance to accomplish the task

## 2023-01-06 NOTE — PROGRESS NOTES
Assessment completed earlier in the shift. VSS. Denies any pain or SOB. Incontinent of stool and urine x 2. LR running at 75 ml/hr. IV Merrem given with no adverse reactions. Safety measures in place. Call light in reach.  Electronically signed by Dionna Mcgee RN on 1/6/2023 at 3:16 AM

## 2023-01-07 LAB
ALBUMIN SERPL-MCNC: 2.3 G/DL (ref 3.5–4.6)
ALP BLD-CCNC: 50 U/L (ref 40–130)
ALT SERPL-CCNC: 15 U/L (ref 0–33)
ANION GAP SERPL CALCULATED.3IONS-SCNC: 9 MEQ/L (ref 9–15)
AST SERPL-CCNC: 27 U/L (ref 0–35)
BASOPHILS ABSOLUTE: 0.1 K/UL (ref 0–0.2)
BASOPHILS RELATIVE PERCENT: 0.5 %
BILIRUB SERPL-MCNC: 0.5 MG/DL (ref 0.2–0.7)
BILIRUBIN DIRECT: <0.2 MG/DL (ref 0–0.4)
BILIRUBIN, INDIRECT: ABNORMAL MG/DL (ref 0–0.6)
BLOOD CULTURE, ROUTINE: ABNORMAL
BUN BLDV-MCNC: 17 MG/DL (ref 8–23)
CALCIUM SERPL-MCNC: 9.3 MG/DL (ref 8.5–9.9)
CHLORIDE BLD-SCNC: 106 MEQ/L (ref 95–107)
CO2: 24 MEQ/L (ref 20–31)
CREAT SERPL-MCNC: 0.47 MG/DL (ref 0.5–0.9)
EOSINOPHILS ABSOLUTE: 0.2 K/UL (ref 0–0.7)
EOSINOPHILS RELATIVE PERCENT: 1.6 %
GFR SERPL CREATININE-BSD FRML MDRD: >60 ML/MIN/{1.73_M2}
GLUCOSE BLD-MCNC: 106 MG/DL (ref 70–99)
HCT VFR BLD CALC: 31.7 % (ref 37–47)
HEMOGLOBIN: 10.6 G/DL (ref 12–16)
LYMPHOCYTES ABSOLUTE: 0.9 K/UL (ref 1–4.8)
LYMPHOCYTES RELATIVE PERCENT: 7.5 %
MCH RBC QN AUTO: 27.7 PG (ref 27–31.3)
MCHC RBC AUTO-ENTMCNC: 33.3 % (ref 33–37)
MCV RBC AUTO: 83.1 FL (ref 79.4–94.8)
MONOCYTES ABSOLUTE: 0.9 K/UL (ref 0.2–0.8)
MONOCYTES RELATIVE PERCENT: 8.1 %
NEUTROPHILS ABSOLUTE: 9.3 K/UL (ref 1.4–6.5)
NEUTROPHILS RELATIVE PERCENT: 82.3 %
PDW BLD-RTO: 16.9 % (ref 11.5–14.5)
PLATELET # BLD: 198 K/UL (ref 130–400)
POTASSIUM REFLEX MAGNESIUM: 3.6 MEQ/L (ref 3.4–4.9)
RBC # BLD: 3.81 M/UL (ref 4.2–5.4)
SARS-COV-2, NAAT: NOT DETECTED
SODIUM BLD-SCNC: 139 MEQ/L (ref 135–144)
TOTAL PROTEIN: 5.5 G/DL (ref 6.3–8)
WBC # BLD: 11.3 K/UL (ref 4.8–10.8)

## 2023-01-07 PROCEDURE — 6360000002 HC RX W HCPCS: Performed by: INTERNAL MEDICINE

## 2023-01-07 PROCEDURE — 6370000000 HC RX 637 (ALT 250 FOR IP): Performed by: INTERNAL MEDICINE

## 2023-01-07 PROCEDURE — 87040 BLOOD CULTURE FOR BACTERIA: CPT

## 2023-01-07 PROCEDURE — 2700000000 HC OXYGEN THERAPY PER DAY

## 2023-01-07 PROCEDURE — 2580000003 HC RX 258: Performed by: INTERNAL MEDICINE

## 2023-01-07 PROCEDURE — 85025 COMPLETE CBC W/AUTO DIFF WBC: CPT

## 2023-01-07 PROCEDURE — 80048 BASIC METABOLIC PNL TOTAL CA: CPT

## 2023-01-07 PROCEDURE — 87635 SARS-COV-2 COVID-19 AMP PRB: CPT

## 2023-01-07 PROCEDURE — 1210000000 HC MED SURG R&B

## 2023-01-07 PROCEDURE — 80076 HEPATIC FUNCTION PANEL: CPT

## 2023-01-07 PROCEDURE — 86403 PARTICLE AGGLUT ANTBDY SCRN: CPT

## 2023-01-07 PROCEDURE — 99232 SBSQ HOSP IP/OBS MODERATE 35: CPT | Performed by: INTERNAL MEDICINE

## 2023-01-07 PROCEDURE — 36415 COLL VENOUS BLD VENIPUNCTURE: CPT

## 2023-01-07 RX ORDER — M-VIT,TX,IRON,MINS/CALC/FOLIC 27MG-0.4MG
1 TABLET ORAL DAILY
OUTPATIENT
Start: 2023-01-08

## 2023-01-07 RX ORDER — MECLIZINE HYDROCHLORIDE 25 MG/1
25 TABLET ORAL EVERY 6 HOURS PRN
OUTPATIENT
Start: 2023-01-07

## 2023-01-07 RX ORDER — CHOLECALCIFEROL (VITAMIN D3) 125 MCG
1000 CAPSULE ORAL DAILY
OUTPATIENT
Start: 2023-01-08

## 2023-01-07 RX ORDER — PANTOPRAZOLE SODIUM 40 MG/1
40 TABLET, DELAYED RELEASE ORAL
OUTPATIENT
Start: 2023-01-08

## 2023-01-07 RX ORDER — TRAMADOL HYDROCHLORIDE 50 MG/1
50 TABLET ORAL EVERY 6 HOURS PRN
OUTPATIENT
Start: 2023-01-07

## 2023-01-07 RX ORDER — DIGOXIN 125 MCG
125 TABLET ORAL DAILY
OUTPATIENT
Start: 2023-01-08

## 2023-01-07 RX ORDER — CITALOPRAM 20 MG/1
20 TABLET ORAL DAILY
OUTPATIENT
Start: 2023-01-08

## 2023-01-07 RX ORDER — VITAMIN B COMPLEX
2000 TABLET ORAL
OUTPATIENT
Start: 2023-01-07

## 2023-01-07 RX ORDER — L. ACIDOPHILUS/L.BULGARICUS 1MM CELL
2 TABLET ORAL DAILY
OUTPATIENT
Start: 2023-01-08

## 2023-01-07 RX ORDER — CARVEDILOL 3.12 MG/1
6.25 TABLET ORAL 2 TIMES DAILY
OUTPATIENT
Start: 2023-01-07

## 2023-01-07 RX ORDER — ENOXAPARIN SODIUM 100 MG/ML
40 INJECTION SUBCUTANEOUS DAILY
Status: CANCELLED | OUTPATIENT
Start: 2023-01-08

## 2023-01-07 RX ORDER — LEVOTHYROXINE SODIUM 88 UG/1
88 TABLET ORAL
OUTPATIENT
Start: 2023-01-08

## 2023-01-07 RX ORDER — TRAMADOL HYDROCHLORIDE 50 MG/1
50 TABLET ORAL EVERY 6 HOURS PRN
Status: DISCONTINUED | OUTPATIENT
Start: 2023-01-07 | End: 2023-01-10 | Stop reason: HOSPADM

## 2023-01-07 RX ADMIN — CARVEDILOL 6.25 MG: 6.25 TABLET, FILM COATED ORAL at 23:03

## 2023-01-07 RX ADMIN — Medication 2 TABLET: at 09:18

## 2023-01-07 RX ADMIN — LEVOTHYROXINE SODIUM 88 MCG: 0.09 TABLET ORAL at 05:18

## 2023-01-07 RX ADMIN — SODIUM CHLORIDE, POTASSIUM CHLORIDE, SODIUM LACTATE AND CALCIUM CHLORIDE: 600; 310; 30; 20 INJECTION, SOLUTION INTRAVENOUS at 07:41

## 2023-01-07 RX ADMIN — Medication 1 TABLET: at 09:20

## 2023-01-07 RX ADMIN — RIVAROXABAN 15 MG: 15 TABLET, FILM COATED ORAL at 16:59

## 2023-01-07 RX ADMIN — ENOXAPARIN SODIUM 40 MG: 100 INJECTION SUBCUTANEOUS at 09:21

## 2023-01-07 RX ADMIN — PANTOPRAZOLE SODIUM 40 MG: 40 TABLET, DELAYED RELEASE ORAL at 05:18

## 2023-01-07 RX ADMIN — DIGOXIN 125 MCG: 125 TABLET ORAL at 09:19

## 2023-01-07 RX ADMIN — MEROPENEM 1000 MG: 1 INJECTION, POWDER, FOR SOLUTION INTRAVENOUS at 13:02

## 2023-01-07 RX ADMIN — CITALOPRAM HYDROBROMIDE 20 MG: 20 TABLET ORAL at 09:19

## 2023-01-07 RX ADMIN — CYANOCOBALAMIN TAB 500 MCG 1000 MCG: 500 TAB at 09:19

## 2023-01-07 RX ADMIN — MEROPENEM 1000 MG: 1 INJECTION, POWDER, FOR SOLUTION INTRAVENOUS at 05:18

## 2023-01-07 RX ADMIN — SACUBITRIL AND VALSARTAN 1 TABLET: 24; 26 TABLET, FILM COATED ORAL at 09:18

## 2023-01-07 RX ADMIN — TRAMADOL HYDROCHLORIDE 50 MG: 50 TABLET ORAL at 14:21

## 2023-01-07 RX ADMIN — MEROPENEM 1000 MG: 1 INJECTION, POWDER, FOR SOLUTION INTRAVENOUS at 23:05

## 2023-01-07 RX ADMIN — Medication 2000 UNITS: at 16:59

## 2023-01-07 RX ADMIN — SACUBITRIL AND VALSARTAN 1 TABLET: 24; 26 TABLET, FILM COATED ORAL at 23:03

## 2023-01-07 RX ADMIN — CARVEDILOL 6.25 MG: 6.25 TABLET, FILM COATED ORAL at 09:20

## 2023-01-07 ASSESSMENT — PAIN DESCRIPTION - LOCATION: LOCATION: NECK;ABDOMEN

## 2023-01-07 ASSESSMENT — PAIN DESCRIPTION - ORIENTATION: ORIENTATION: RIGHT;UPPER

## 2023-01-07 ASSESSMENT — PAIN DESCRIPTION - DESCRIPTORS: DESCRIPTORS: ACHING;NAGGING

## 2023-01-07 ASSESSMENT — PAIN SCALES - GENERAL: PAINLEVEL_OUTOF10: 7

## 2023-01-07 ASSESSMENT — PAIN - FUNCTIONAL ASSESSMENT: PAIN_FUNCTIONAL_ASSESSMENT: PREVENTS OR INTERFERES SOME ACTIVE ACTIVITIES AND ADLS

## 2023-01-07 NOTE — DISCHARGE INSTR - COC
Continuity of Care Form    Patient Name: Roshan Camargo   :  1932  MRN:  [id number]    Admit date:  2023  Discharge date:  1/10/2023      Code Status Order: Limited NO INTUBATION   Advance Directives:     Admitting Physician:  aLureen Lackey DO  PCP: Martir Bailey MD    Discharging Nurse: Katrin Flores. [facility] Unit/Room#: U848/J436-04  Discharging Unit Phone Number: [phone]    Emergency Contact:   Extended Emergency Contact Information  Primary Emergency Contact: [address] [facility] [address] Phone: [phone]  Relation: Child  Secondary Emergency Contact: [address] [facility]  Mobile Phone: [phone]  Relation: Child    Past Surgical History:  Past Surgical History:   Procedure Laterality Date    COLONOSCOPY N/A 2023    COLONOSCOPY DIAGNOSTIC performed by Dominique Hatch MD at [facility]    IR INS PICC VAD W SQ PORT GREATER THAN 5  2022    IR INS PICC VAD W SQ PORT GREATER THAN 5 2022 [facility] SPECIAL PROCEDURE    JOINT REPLACEMENT Bilateral     knees    OTHER SURGICAL HISTORY Right 2022    cat scan guided abdominal mass aspiration with drain placement by Dr. Ramón Hargrove Left 2021    LEFT PLEURAL CATHETER INSERTION performed by Clive Bridges MD at [place] [address] Left 2021    total of 755 cc removed per Dr Jorja Sandifer specimen sent to lab       Immunization History: There is no immunization history on file for this patient. 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 A    Thoracic aortic aneurysm without rupture I71.20    Descending aortic aneurysm ([facility]) I71.9    Vertigo R42    Shortness of breath R06.02    Essential hypertension I10    Acute on chronic combined systolic and diastolic CHF (congestive heart failure) ([facility]) I50.43    Gait abnormality R26.9    A-fib ([facility]) I48.91    Pleural effusion J90    Hypoxia R09.02    Acute pulmonary edema ([facility]) J81.0    Acute on chronic combined systolic (congestive) and diastolic (congestive) heart failure ([facility]) I50.43    Acute cystitis with hematuria N30.01    Chronic diastolic CHF (congestive heart failure) ([facility]) I50.32    Right lower quadrant abdominal pain R10.31    Hyponatremia E87.1    Hypokalemia E87.6    Anemia D64.9    CHF (congestive heart failure) ([facility]) I50.9    Hypothyroid E03.9    Colonic mass K63.89    Central retinal vein occlusion, left eye, stable H34.8122    Hx of drainage of abscess Z98.890    Abdominal pain R10.9    Streptococcus viridans infection A49.1    Aortic thrombus ([facility]) I74.10    Intra-abdominal abscess ([facility]) K65.1    Adnexal mass N94.89    Change or removal of drains Z48.03    Difficulty in walking R26.2    Muscle weakness (generalized) G17.56    Acute diastolic HF (heart failure) ([facility]) I50.31    CHF (congestive heart failure), NYHA class I, acute on chronic, combined ([facility]) I50.43    Gastro-esophageal reflux disease without esophagitis K21.9    Intracardiac thrombosis, not elsewhere classified I51.3    Impaired mobility and ADLs Z74.09, Z78.9    Palliative care encounter Z51.5    Advanced care planning/counseling discussion Z71.89    Goals of care, counseling/discussion Z71.89    Adenomatous polyp of descending colon D12.4    Adenomatous polyp of colon D12.6    Weakness R53.1    Encounter for hospice care discussion Z71.89    Pneumonia of left lower lobe due to infectious organism J18.9    Gram-negative bacteremia R78.81    H/O abdominal abscess Z87.898    Generalized weakness R53.1       Isolation/Infection:   Isolation            No Isolation          Patient Infection Status       Infection Onset Added Last Indicated Last Indicated By Review Planned Expiration Resolved Resolved By    None active    Resolved    COVID-19 (Rule Out) 01/04/23 01/04/23 01/04/23 COVID-19, Rapid (Ordered)   01/04/23 Rule-Out Test Resulted    C-diff Rule Out 07/24/22 07/24/22 07/24/22 Clostridium Difficile Toxin/Antigen (Ordered)   07/24/22 Rule-Out Test Resulted    COVID-19 (Rule Out) 02/19/21 02/19/21 02/19/21 COVID-19, Rapid (Ordered)   02/19/21 Rule-Out Test Resulted    COVID-19 (Rule Out) 02/10/21 02/10/21 02/10/21 COVID-19 (Ordered)   02/10/21 Rule-Out Test Resulted    COVID-19 (Rule Out) 02/09/21 02/09/21 02/09/21 COVID-19 (Ordered)   02/09/21 Rule-Out Test Resulted    COVID-19 (Rule Out) 02/02/21 02/02/21 02/02/21 COVID-19 (Ordered)   02/02/21 Rule-Out Test Resulted    COVID-19 (Rule Out) 01/25/21 01/25/21 01/26/21 COVID-19 (Ordered)   01/26/21 Rule-Out Test Resulted    COVID-19 (Rule Out) 01/25/21 01/25/21 01/25/21 COVID-19 (Ordered)   01/25/21 Rule-Out Test Resulted            Nurse Assessment:  Last Vital Signs: /67   Pulse (!) 128   Temp 98.2 °F (36.8 °C) (Oral)   Resp 18   Ht 5' (1.524 m)   Wt 211 lb 11.2 oz (96 kg)   SpO2 97%   BMI 41.34 kg/m²     Last documented pain score (0-10 scale): Pain Level: 0  Last Weight:   Wt Readings from Last 1 Encounters:   01/06/23 211 lb 11.2 oz (96 kg)     Mental Status:  oriented and alert    IV Access:  RIGHT MIDLINE INSERTED ON 1/9/23    Nursing Mobility/ADLs:  Walking   Assisted  Transfer  Assisted  Bathing  Assisted  Dressing  Assisted  Toileting  Assisted  Feeding  Independent  Med Admin  Assisted  Med Delivery   whole    Wound Care Documentation and Therapy:  Wound 11/11/22 Pelvis Anterior reddened abd (Active)   Number of days: 56       Incision 02/25/21 Abdomen Lateral;Left;Upper (Active)   Number of days: 681        Elimination:  Continence:    Bowel: Yes  Bladder: Yes  Urinary Catheter: None Colostomy/Ileostomy/Ileal Conduit:        Date of Last BM: 1/10/2023  No intake or output data in the 24 hours ending 01/07/23 1226  No intake/output data recorded. Safety Concerns: At Risk for Falls    Impairments/Disabilities:      Language Barrier - Speaks [place], Understands some English. Nutrition Therapy:  Current Nutrition Therapy:   - Oral Nutrition Supplement:  Diabetic  three times a day    Routes of Feeding: Oral  Liquids: Thin Liquids  Daily Fluid Restriction: no  Last Modified Barium Swallow with Video (Video Swallowing Test): not done    Treatments at the Time of Hospital Discharge:   Respiratory Treatments: None    Oxygen Therapy:  is on oxygen at 3 L/min per nasal cannula.   Ventilator:    - No ventilator support    Rehab Therapies: Physical Therapy and Occupational Therapy  Weight Bearing Status/Restrictions: No weight bearing restrictions  Other Medical Equipment (for information only, NOT a DME order):  walker and bedside commode  Other Treatments: NA    Patient's personal belongings (please select all that are sent with patient):  None    RN SIGNATURE:  Electronically signed by Maricruz Posada RN on 1/7/23 at 12:53 PM EST    CASE MANAGEMENT/SOCIAL WORK SECTION    Inpatient Status Date: 1/5/23    Readmission Risk Assessment Score:  Readmission Risk              Risk of Unplanned Readmission:  31           Discharging to Facility/ Agency   Name: [facility]  Address:  [facility]:[phone]  Fax:    Dialysis Facility (if applicable)   Name:  Address:  Dialysis Schedule:  Phone:  Fax:    / signature: Electronically signed by JACKLYN Alexandra on 1/9/23 at 2:55 PM EST    PHYSICIAN SECTION    Prognosis: {Prognosis:9868690193}    Condition at Discharge: [facility] Patient Condition:[id number]}    Rehab Potential (if transferring to Rehab): {Prognosis:9685518606}    Recommended Labs or Other Treatments After Discharge: ***    Physician Certification: I certify the above information and transfer of Loida Abreu  is necessary for the continuing treatment of the diagnosis listed and that she requires {Admit to Appropriate Level of Care:42272} for {GREATER/LESS:858754089} 30 days.      Update Admission H&P: {CHP DME Changes in [facility]:566785389}    PHYSICIAN SIGNATURE:  Electronically signed by Paul Singh DO on 1/7/23 at 12:26 PM EST

## 2023-01-07 NOTE — CARE COORDINATION
REHAB CANNOT ACCEPT THE PATIENT. THE PATIENT WOULD BE BETTER SUITED FOR A SNF AT D/C. THE FAMILY WILL NEED TO PICK A SNF FOR D/C. CM/LSW TO FOLLOW UP Monday FOR D/C CHOICES. FAMILY HAS SELECTED [facility] AS THEIR SNF CHOICE.  REFERRAL SENT

## 2023-01-07 NOTE — DISCHARGE SUMMARY
[facility] Medicine Discharge Summary    Sergio Sabillon  :  1932  MRN:  [id number]    Admit date:  2023    Discharge date:  2023    Admitting Physician:  Ligia Lira DO  Primary Care Physician:  Jamila Holbrook MD    Discharge Diagnoses:    Principal Problem:    Gram-negative bacteremia  Active Problems:    Colonic mass    Weakness    Encounter for hospice care discussion    Pneumonia of left lower lobe due to infectious organism    H/O abdominal abscess    Generalized weakness  Resolved Problems:    * No resolved hospital problems. *    Chief Complaint   Patient presents with    Headache     Headache and weakness since colonoscopy this morning. Condition: improved   Activity: no strenuous activity   Diet: full liquid, advance as tolerated  Disposition: [facility] rehab  Functional Status: ambulatory with assistance    Significant Findings:     CT Abd/Pelvis:  Slight increase seen in size of the complex process in the right lower   quadrant with new presence of gas indicating communication with the bowel. The process appears to be centered around the cecum with involvement as well   seen in the ascending colon and several distal small bowel loops. Findings   concerning for abscess or necrotic mass with possible superimposed infection. Stable 9.1 cm cystic lesion in the left adnexa in which 6-12 month follow-up   can be performed as well as gynecologic consultation as clinically indicated.      Recent Labs     23  0512 23  0520 23  2108   WBC 11.3* 15.9* 17.3*   HGB 10.6* 11.4* 11.2*   HCT 31.7* 34.3* 34.6*   MCV 83.1 82.9 83.1    209 213     1/2 Bcx positive for GPC in clusters and gram-negative rods      Hospital Course:   51-year-old female with class III obesity, chronic combined heart failure, hypertension, known colonic mass presented following colonoscopy with sepsis secondary to bacteremia related to complex mass with possible abscess formation in her right lower quadrant. Family declined surgical intervention. She will continue on IV antibiotics per direction of infectious disease who will continue to follow while she is on acute rehab. She will also continue to be followed by palliative care. She is hospice appropriate if patient or family elected for this. Final blood cultures and surgical pathology from her colonoscopy on 1/4 should be followed up. Exam on Discharge:   /67   Pulse (!) 128   Temp 98.2 °F (36.8 °C) (Oral)   Resp 18   Ht 5' (1.524 m)   Wt 211 lb 11.2 oz (96 kg)   SpO2 97%   BMI 41.34 kg/m²   General appearance: Elderly, tired appearing, and chronically ill-appearing. Lungs: clear to auscultation bilaterally, normal effort  Heart: regular rate and rhythm, no murmur  Abdomen: Fullness and dullness to percussion right lower quadrant. Mild tenderness to palpation. No rebound tenderness or guarding. Extremities: no edema, redness, tenderness in the calves.  Cap refill <2s      Current Facility-Administered Medications:     traMADol (ULTRAM) tablet 50 mg, 50 mg, Oral, Q6H PRN, Niko Lopez DO    rivaroxaban (XARELTO) tablet 15 mg, 15 mg, Oral, Daily, Mick Wall DO    meropenem (MERREM) 1,000 mg in sodium chloride 0.9 % 100 mL IVPB (mini-bag), 1,000 mg, IntraVENous, Q8H, Avis Casas DO, Stopped at 01/07/23 0551    citalopram (CELEXA) tablet 20 mg, 20 mg, Oral, Daily, Niko Lopez DO, 20 mg at 01/07/23 0919    digoxin (LANOXIN) tablet 125 mcg, 125 mcg, Oral, Daily, Niko Lopez DO, 125 mcg at 01/07/23 0919    lactobacillus acidophilus [facility]) 2 tablet, 2 tablet, Oral, Daily, Niko Lopez DO, 2 tablet at 01/07/23 9015    meclizine (ANTIVERT) tablet 25 mg, 25 mg, Oral, Q6H PRN, Niko Lopez DO    therapeutic multivitamin-minerals 1 tablet, 1 tablet, Oral, Daily, Niko Lopez DO, 1 tablet at 01/07/23 0920    pantoprazole (PROTONIX) tablet 40 mg, 40 mg, Oral, EBENEZER ALLEN, Mahendra Mendez DO, 40 mg at 01/07/23 0518    levothyroxine (SYNTHROID) tablet 88 mcg, 88 mcg, Oral, EBENEZER ALLEN, Mahendra Mendez DO, 88 mcg at 01/07/23 0518    vitamin B-12 (CYANOCOBALAMIN) tablet 1,000 mcg, 1,000 mcg, Oral, Daily, Mahendra Mendez DO, 1,000 mcg at 01/07/23 0919    vitamin D (CHOLECALCIFEROL) tablet 2,000 Units, 2,000 Units, Oral, Dinner, Mahendra Mendez DO, 2,000 Units at 01/06/23 1715    sacubitril-valsartan (ENTRESTO) 24-26 MG per tablet 1 tablet, 1 tablet, Oral, BID, Mahendra Mendez DO, 1 tablet at 01/07/23 0918    carvedilol (COREG) tablet 6.25 mg, 6.25 mg, Oral, BID, Mahendra Mendez DO, 6.25 mg at 01/07/23 0920        DC time 37 minutes    Signed:  Mahendra Mendez DO  1/7/2023, 12:26 PM

## 2023-01-07 NOTE — CARE COORDINATION
[facility] Pre-Admission Screening Document      Patient Name: Joanne Ryan       MRN: [id number]    : 1932    Age: 80 y.o. Gender: female   Payor: Payor: MEDICARE / Plan: MEDICARE PART A AND B / Product Type: *No Product type* /   MSSP: No    Admitted from: [facility] Floor: 2W  Attending Care Provider: Luis Harrison DO  Inpatient Rehab Referring Care Provider: Dr. Vincenzo Manzo  Primary Care Provider: Lord Kevin MD  Inpatient Treatment Team including Consults: Treatment Team: Attending Provider: Luis Harrison DO; Consulting Physician: Brett Cardenas DO; Consulting Physician: Cielo Moyer MD; Consulting Physician: Conchita Vines MD; : Miguel Giraldo RN; Registered Nurse: Alberta Talamantes RN; Registered Nurse: Mariam Marie RN; : Hannah Mohan, ADDIE; Respiratory Therapist (Day): Laureen Lovelace RCP; : Jose Armando Hernandez RN; Utilization Reviewer: Shant Leone RN    Reason for Hospitalization:   1. Pneumonia of left lower lobe due to infectious organism    2. Generalized weakness    3. Dehydration    4. Acute nonintractable headache, unspecified headache type      Chief Complaint   Patient presents with    Headache     Headache and weakness since colonoscopy this morning. Isolation:No active isolations    Hospital Course:  Admit Date: 2023  6:18 PM  Inpatient Rehab Referral Date: 2023  Narrative of hospital course/history of present illness: 80 y.o. female presented to ED with abdominal pain, weakness, and diarrhea following colonscopy. Patient has a previously noted RLQ mass diagnosed at [facility] in December. Patient was treated with antibiotics and were discontinued. During previous admission, IR was not available for biopsy and patient was scheduled for outpatient colonoscopy for biopsy. Colonoscopy performed on 23 with biopsy.  CT abdomen/pelivs showed slight increase in size of complex process in RLQ with new presence of gas indicating communication with the bowel. Process appears to be centered around the cecum with involvement in the ascending colon and several distal small bowel loops. Findings concerning for abscess or necrotic mass with possible superimposed infection.     Medical & Surgical History/Current Comorbidities:  Past Medical History:   Diagnosis Date    Ascending aortic aneurysm 6/23/2017    Charcot Arleen Tooth muscular atrophy     CHF (congestive heart failure) (HCC)     Chronic diastolic CHF (congestive heart failure) ([facility] [address] 4/1/2022    Depression     Descending aortic aneurysm ([facility] [address] 6/23/2017    Essential hypertension 2/16/2018    Headache     HTN (hypertension)     Impaired mobility and activities of daily living     Lumbar stenosis with neurogenic claudication     Myelopathy [facility])     Osteoarthritis      Past Surgical History:   Procedure Laterality Date    COLONOSCOPY N/A 1/4/2023    COLONOSCOPY DIAGNOSTIC performed by Gisele Lange MD at [facility]    IR INS PICC VAD W SQ PORT GREATER THAN 5  9/23/2022    IR INS PICC VAD W SQ PORT GREATER THAN 5 9/23/2022 [facility] SPECIAL PROCEDURE    JOINT REPLACEMENT Bilateral     knees    OTHER SURGICAL HISTORY Right 07/21/2022    cat scan guided abdominal mass aspiration with drain placement by Dr. Madhu Jackson Left 02/25/2021    LEFT PLEURAL CATHETER INSERTION performed by Adrián Merchant MD at [place] [id number] Left 01/29/2021    total of 755 cc removed per Dr Carmen Tay specimen sent to lab       Advanced Directives:  Advance Directives       Power of  Living Will ACP-Advance Directive ACP-Power of     Not on [place] on 01/05/23 Filed Not on File            Labs/Infection Control:  Recent Labs     01/04/23  2108 01/05/23  0348 01/06/23  0520 01/07/23  0512   WBC 17.3*  --  15.9* 11.3*   HGB 11.2*  --  11.4* 10.6*   HCT 34.6*  --  34.3* 31.7*     --  209 198   BUN 20 24* 21 17   CREATININE 0.82 0.88 0.57 0.47*   GLUCOSE 152* 213* 97 106*    134* 136 139   K 3.0* 4.1 3.3* 3.6   CALCIUM 9.3 9.3 9.3 9.3   PROT 6.6 6.5 6.1* 5.5*      Blood cultures:  Recent Labs     01/05/23  0348   BC Gram stain aerobic bottle  Gram positive cocci in clusters-resembling Staph  1 out of 2 blood cultures  Gram stain anaerobic bottle  Gram negative rods  1 out of 2 blood cultures  Further results to follow  Further testing performed at [facility]  *  No Growth to date. Any change in status will be called. Urinalysis/C&S:  Recent Labs     01/04/23  2214   WBCUA 0-2   RBCUA 3-5*   BACTERIA Negative   LEUKOCYTESUR TRACE*   BLOODU TRACE*   GLUCOSEU Negative   KETUA TRACE*       Radiology:  CT HEAD WO CONTRAST    Result Date: 1/4/2023  EXAMINATION: CT OF THE HEAD WITHOUT CONTRAST  1/4/2023 7:27 pm TECHNIQUE: CT of the head was performed without the administration of intravenous contrast. Automated exposure control, iterative reconstruction, and/or weight based adjustment of the mA/kV was utilized to reduce the radiation dose to as low as reasonably achievable. COMPARISON: None. 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 ->No Decision Support Exception - unselect if not a suspected or confirmed emergency medical condition->Emergency Medical Condition ([place]) What reading provider will be dictating this exam?->CRC FINDINGS: BRAIN/VENTRICLES: There is no acute intracranial hemorrhage, mass effect or midline shift. No abnormal extra-axial fluid collection. The gray-white differentiation is maintained without evidence of an acute infarct. There is prominence of the ventricles and sulci due to global parenchymal volume loss.  There are nonspecific areas of hypoattenuation within the periventricular and subcortical white matter, which likely represent chronic microvascular ischemic change. ORBITS: The visualized portion of the orbits demonstrate no acute abnormality. SINUSES: The visualized paranasal sinuses and mastoid air cells demonstrate no acute abnormality. There is mild mucosal thickening in the maxillary sinuses. SOFT TISSUES/SKULL: No acute abnormality of the visualized skull or soft tissues. No acute intracranial abnormality. CT ABDOMEN PELVIS W IV CONTRAST Additional Contrast? None    Result Date: 1/5/2023  EXAMINATION: CT OF THE ABDOMEN AND PELVIS WITH CONTRAST 1/5/2023 1:19 am TECHNIQUE: CT of the abdomen and pelvis was performed with the administration of intravenous contrast. Multiplanar reformatted images are provided for review. Automated exposure control, iterative reconstruction, and/or weight based adjustment of the mA/kV was utilized to reduce the radiation dose to as low as reasonably achievable. COMPARISON: 12/06/2022 HISTORY: ORDERING SYSTEM PROVIDED HISTORY: abd pain and fever post colonoscopy today TECHNOLOGIST PROVIDED HISTORY: Reason for exam:->abd pain and fever post colonoscopy today Additional Contrast?->None What reading provider will be dictating this exam?->CRC FINDINGS: Lower Chest: Atelectatic changes seen lung bases bilaterally. Enlargement identified of the ascending thoracic aorta as well as the descending thoracic aorta. Ascending thoracic aorta measures 5.6 x 5.7 cm with descending thoracic aorta measuring 4.3 x 4.3 cm. Organs: Liver is homogeneous in appearance. Low-attenuation focus seen inferiorly within the right lobe too small to characterize. The gallbladder is contracted. No evidence of stones or wall thickening. The pancreas is homogeneous. The spleen is heterogeneous and unremarkable. Both adrenal glands are without evidence of abnormality. Symmetric enhancement of the renal parenchyma. No stones or distension seen in the renal collecting system. GI/Bowel: Stomach is unremarkable in appearance. No wall thickening. The small bowel is within normal limits. No mucosal abnormality. There is a large complex process seen in the right lower quadrant measuring 15.0 x 9.6 cm most compatible with an abscess. This process is increased in size when compared to the prior study. New presence of gas indicating communication with the bowel. Findings may reflect communication with the right sided bowel and cecum. Large carotic mass with super infection is possible. The loops of distal ileum within the right lower quadrant appear to be involved. No evidence of small-bowel obstruction. Pelvis: The bladder is mildly distended. No gross mass or lesion. Simple cystic structure identified within the left adnexa measuring up to 9.1 cm. Recommend follow-up ultrasound in 6-12 months. Gynecologic consultation can be performed. Peritoneum/Retroperitoneum: There is no abdominal retroperitoneal lymphadenopathy. No pelvic adenopathy. Vascular calcifications seen within the abdominal aorta and iliac vessels. Bones/Soft Tissues: Multilevel degenerative changes seen within the spine. Small right inguinal hernia containing fat only. Slight increase seen in size of the complex process in the right lower quadrant with new presence of gas indicating communication with the bowel. The process appears to be centered around the cecum with involvement as well seen in the ascending colon and several distal small bowel loops. Findings concerning for abscess or necrotic mass with possible superimposed infection. Stable 9.1 cm cystic lesion in the left adnexa in which 6-12 month follow-up can be performed as well as gynecologic consultation as clinically indicated. XR CHEST PORTABLE    Result Date: 1/4/2023  EXAMINATION: ONE XRAY VIEW OF THE CHEST 1/4/2023 7:16 pm COMPARISON: None.  HISTORY: ORDERING SYSTEM PROVIDED HISTORY: fever TECHNOLOGIST PROVIDED HISTORY: Reason for exam:->fever What reading provider will be dictating this exam?->CRC FINDINGS: There is cardiomegaly. There is vascular congestion. There is tortuous dilated aortic arch and descending thoracic aorta. There is patchy infiltrates and pleural effusion in the left lung base. Cardiomegaly with the dilated aortic arch and descending thoracic aorta. Consider CT for better assessment. Patchy left basilar infiltrate and effusion likely pneumonia. Colonoscopy    Result Date: 1/4/2023  Site: [address] [place] [address] Patient Name: Chelsi David Procedure Date: 1/4/2023 MRN: [id number] YOB: 1932 Gender: Female Attending MD: Shannon Wolf Indications:        -  Abnormal CT of the GI tract        - high CEA. Medications:        -  See the Anesthesia note for documentation of the administered medications Complications:        -  No immediate complications. Estimated Blood Loss:        -  Estimated blood loss was minimal. Procedure:        - The Colonoscope was introduced through the anus and advanced to the cecum,           identified by appendiceal orifice and ileocecal valve. -  The colonoscopy was performed without difficulty. -  The patient tolerated the procedure well. -  The quality of the bowel preparation was good. -  The ileocecal valve, appendiceal orifice, and rectum were photographed. Findings:        -  A 12 mm polyp was found in the rectum. The polyp was pedunculated. The           polyp was removed with a hot snare. Resection and retrieval were complete. -  A 10 mm polyp was found in the descending colon. The polyp was sessile. The polyp was removed with a cold snare. Resection and retrieval were           complete. -  A 7 mm polyp was found in the hepatic flexure. The polyp was sessile. The           polyp was removed with a cold snare. Resection and retrieval were complete.         -  A frond-like/villous, fungating, infiltrative, friable and ulcerated           partially obstructing large mass was found in the ascending colon. The mass           was partially circumferential (involving two-thirds of the lumen           circumference). The mass measured 6 cm (in length). This mass was extending           into the hepatic flexure. No spontaneous bleeding was present. Biopsies were           taken with a cold forceps for histology. -  A frond-like/villous, ulcerated, infiltrative ,friable  and polypoid           non-obstructing large mass was found in the cecum. The mass was partially           circumferential (involving one-half of the lumen circumference). No bleeding           was present. Biopsies were taken with a cold forceps for histology. Impression:        -  One 12 mm polyp in the rectum, removed with a hot snare. Resected and           retrieved. -  One 10 mm polyp in the descending colon, removed with a cold snare. Resected and retrieved. -  One 7 mm polyp at the hepatic flexure, removed with a cold snare. Resected           and retrieved. 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 Biopsied.        -  Likely malignant tumor in the cecum. Biopsied. Recommendation:        -  Return to my office as previously scheduled. - surgical consult after discussing with patient and family.  Procedure Code(s):        - [id number], Colonoscopy, flexible; with removal of tumor(s), polyp(s), or other           lesion(s) by snare technique        - 71842-24, Colonoscopy, flexible; with biopsy, single or multiple Diagnosis Code(s):        - R93.3, Abnormal findings on diagnostic imaging of other parts of digestive           tract        - D12.8, Benign neoplasm of rectum        - D12.4, Benign neoplasm of descending colon        - D12.3, Benign neoplasm of transverse colon (hepatic flexure or splenic           flexure)        - D49.0, Neoplasm of unspecified behavior of digestive system        - K56.690, Other partial intestinal obstruction       CPT(R) - 2022 copyright [employer] Association. All Rights Reserved. The CPT codes, CCI edits and ICD codes generated are intended as suggestions       and were generated based on input data. These codes are preliminary and upon        review may be revised to meet current compliance and payer requirements. The provider is responsible for the final determination of appropriate codes,       and modifiers. Scope Withdrawal Time:       00:15:21 Signature Name: Ra Biswas MD Signature Statement: This document has been electronically signed. Note Initiated On:1/4/2023 Signature Date:1/4/2023 12:30 PM        Medications/IV's:  The patient is currently on lovenox for DVT prophylaxis. Scheduled:    enoxaparin, 40 mg, SubCUTAneous, Daily    meropenem, 1,000 mg, IntraVENous, Q8H    citalopram, 20 mg, Oral, Daily    digoxin, 125 mcg, Oral, Daily    lactobacillus acidophilus, 2 tablet, Oral, Daily    therapeutic multivitamin-minerals, 1 tablet, Oral, Daily    pantoprazole, 40 mg, Oral, QAM AC    levothyroxine, 88 mcg, Oral, QAM AC    vitamin B-12, 1,000 mcg, Oral, Daily    Vitamin D, 2,000 Units, Oral, Dinner    sacubitril-valsartan, 1 tablet, Oral, BID    carvedilol, 6.25 mg, Oral, BID    PRN:  meclizine    Allergies: Allergies   Allergen Reactions    Latex     Tape Etha Coral Tape]          Most Recent Vitals, Height and Weight  /67   Pulse (!) 128   Temp 98.2 °F (36.8 °C) (Oral)   Resp 18   Ht 5' (1.524 m)   Wt 211 lb 11.2 oz (96 kg)   SpO2 97%   BMI 41.34 kg/m²     Weight Bearing Restrictions: WBAT       Current Diet Order: ADULT DIET;  Full Liquid  ADULT ORAL NUTRITION SUPPLEMENT; Breakfast, Dinner, Lunch; Diabetic Oral Supplement    Skin: RLE trace edema  Wound Care Documentation:  Wound 11/11/22 Pelvis Anterior reddened abd (Active)   Number of days: 56          Lungs: Respiratory  Respiratory Quality/Effort: Unlabored  Chest Assessment: Chest expansion symmetrical, Trachea midline      Cognition and Behavior:  Language Preference (if other than English):      Alertness/Behavior  Neuro (WDL): Exceptions to WDL  Level of Consciousness: Alert (0)  History of Falling: No Cognition Comment: follows one step commands with visual cues and demonstration    Short Term Memory Deficits  Patient's memory adequate to safely complete daily activities?: Unable to Assess  History of Falling: No    Safety  Patient's Judgement Adequate to Safely Complete Daily Activities: Unable to Assess  Patient Able to Express Needs/Desires: Yes       Prior Level of Function and Living Arrangements:  Social/Functional History  Lives With: Alone  Type of Home: House  Home Layout: One level  Home Access: Stairs to enter with rails  Entrance Stairs - Number of Steps: 2  Entrance Stairs - Rails: Both  Bathroom Shower/Tub: Tub/Shower unit  Bathroom Equipment: Grab bars in shower  Home Equipment: [place], Walker, rolling (Bilat AFO)  ADL Assistance: Independent  Homemaking Assistance:  (Son assists with shopping, family checks on daily)  Ambulation Assistance: Independent (ww)  Transfer Assistance: Independent  Additional Comments: information gathered from Rehab hospital stay in Nov 2022. Pt had been at an indep level when discharged  Dental Appliances: None  Vision - Corrective Lenses: None  Hearing Aid: None  Personal Equipment:   Dental Appliances: None  Vision - Corrective Lenses: None  Hearing Aid: None      CURRENT FUNCTIONAL LEVEL:  Physical Therapy  Bed mobility:  Bed mobility  Bridging: Minimal assistance (01/06/23 1008)  Rolling to Left: Minimal assistance (01/06/23 1008)  Supine to Sit: Moderate assistance (01/06/23 1008)  Sit to Supine:  Moderate assistance (01/06/23 1008)  Scooting: Minimal assistance (01/06/23 1008)  Bed Mobility Comments: \hob elevated initially (01/06/23 1008)  Transfers:  Transfers  Sit to Stand: Moderate Assistance;2 Person Assistance (pt with LOB post requiring lifting assistance intermittently to correct) (01/06/23 1011)  Stand to Sit: Moderate Assistance (poor safety awareness of surface proximity) (01/06/23 1011)  Bed to Chair: Moderate assistance;2 Person Assistance (to [facility] with ww) (01/06/23 1011)  Gait:   Ambulation  Surface: Level tile (01/06/23 1014)  Device: Rolling Walker (01/06/23 1014)  Other Apparatus: O2 (no braces in place for this session) (01/06/23 1014)  Assistance: Moderate assistance;2 Person assistance (01/06/23 1014)  Quality of Gait: posterior LOB, poor walker management, flexed trunk, knees stable (01/06/23 1014)  Distance: 5- STEPS X2 (01/06/23 1014)  Stairs:     W/C mobility:         Occupational Therapy  Hand Dominance: Right  ADL  Feeding: Unable to assess(comment) (01/06/23 1009)  Grooming: Stand by assistance; Increased time to complete (01/06/23 1009)  UE Bathing: Contact guard assistance; Increased time to complete (01/06/23 1009)  LE Bathing: Moderate assistance; Increased time to complete (01/06/23 1009)  UE Dressing: Minimal assistance; Increased time to complete (01/06/23 1009)  LE Dressing: Moderate assistance; Increased time to complete (01/06/23 1009)  Toileting: Maximum assistance; Increased time to complete (01/06/23 1009)  Toilet Transfers  Toilet - Technique: Stand step (01/06/23 1012)  Equipment Used: Standard bedside commode (01/06/23 1012)  Toilet Transfer:  Moderate assistance (01/06/23 1012)            Speech Language Pathology n/a      Diet/Swallow:   Adult Diet: Full liquid, adult oral nutrition supplement: breakfast, lunch, and dinner; diabetic oral supplement          Current Conditions Requiring Inpatient Rehabilitation  Bowel/Bladder Dysfunction: Yes  Intervention Required = Frequent toileting  Risk for Medical/Clinical Complications = moderate  Skin Healing/Breakdown Risk: Yes  Intervention Required = Side to side turns and pelvis anterior reddened abd  Risk for Medical/Clinical Complications = high  Nutrition/Hydration Deficiency: Yes  Intervention Required = Monitor I&Os, Check Labs, Dietary Eval, and on Full liquid diet  Risk for Medical/Clinical Complications = high  Medical Comorbidities: Yes  Intervention Required = DVT risk and ascending aortic aneurysm, CHF, Charcot Arleen Tooth muscular atrophy, lumbar stenosis with neurogenic claudication, myelopathy, osteoarthritis  Risk for Medical/Clinical Complications = high    Rehab/Skilled Needs:   3 hours of Intensive Acute Rehab therapy daily, 5 days/week for a total of 900 minutes  PT Treatment Time:  1.5 hrs/day  OT Treatment Time: 1.5 hrs/day  Rehabilitation Nursing   Case management/Social work  Wound Care  Dietitian/Nutrition    Cultural needs:   Values / Beliefs  Do You Have Any Ethnic, Cultural, Sacramental, or Spiritual Hoahaoism Needs You Would Like Us To Be Aware of While You Are in the Hospital : No   Funding needs: To Be Determined    Expected Level of Improvement with Rehab  Assist for ADL Contact Guard / Minimal Assistance  Assist for Transfers Supervision / [address] for Gait Supervision / Standby Assist    Patient's willingness to participate: Yes  Patient's ability to tolerate proposed care: Yes  Patient/Family Goals of Rehab (in patient's/family's own words):      Anticipated Discharge Plan:  Home with   Home Health, RN PT OT Aide Social Work to be determined    Barriers to Discharge:  Home entry accessibility  Equipment needs  Caregiver availability  Resource availability      Rehab evaluation plan: Recommend Acute Inpatient Rehab  Rehabilitation Impairment Group Code: [id number]  Rehab Impairment Group: Medical: Deconditioning  Estimated Length of Stay (days): 15  Rehab Diagnosis: Impaired mobility and ADL's due to Debility 2nd to Colonic Mass and Pneumonia  Reviewer's Signature: Electronically signed by Artur Ybarra RN on 1/7/23 at 10:40 AM EST      I have reviewed and concur with the above Preadmission Screening.    Rehab Admitting Doctor: Dr. Araceli Castano MD

## 2023-01-07 NOTE — CONSULTS
Physical Medicine & Rehabilitation  Consult Note      Admitting Physician: Juan Carlos Hernandez DO    Primary Care Provider: Andrea Oviedo MD     Reason for Consult:  Asses rehab needs, promote physical and mental function, analyze level of care to determine rehab needs, improve ability to actively participate in the rehabilitation process, and decrease likelihood of re-admit to the hospital after discharge. History of Present Illness:    Shasha Giron is a 80 y.o. female admitted to [facility] on 1/4/2023. HPI    80 y.o. female presented to ED with abdominal pain, weakness, and diarrhea following colonscopy. Patient has a previously noted RLQ mass diagnosed at [facility] in December. Patient was treated with antibiotics and were discontinued. During previous admission, IR was not available for biopsy and patient was scheduled for outpatient colonoscopy for biopsy. Colonoscopy performed on 1/4/23 with biopsy. CT abdomen/pelivs showed slight increase in size of complex process in RLQ with new presence of gas indicating communication with the bowel. Process appears to be centered around the cecum with involvement in the ascending colon and several distal small bowel loops. Findings concerning for abscess or necrotic mass with possible superimposed infection. I reviewed recent nursing notes discussed care with acute care providers, \"  see notes  \". Events from the previous 24 hours reviewed    .       Their inpatient work up has included:    Imaging:  Imaging and other studies reviewed and discussed with patient and staff    CT HEAD WO CONTRAST    Result Date: 1/4/2023  EXAMINATION: CT OF THE HEAD WITHOUT CONTRAST  1/4/2023 7:27 pm TECHNIQUE: CT of the head was performed without the administration of intravenous contrast. Automated exposure control, iterative reconstruction, and/or weight based adjustment of the mA/kV was utilized to reduce the radiation dose to as low as reasonably achievable. COMPARISON: None. 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 ->No Decision Support Exception - unselect if not a suspected or confirmed emergency medical condition->Emergency Medical Condition (MA) What reading provider will be dictating this exam?->CRC FINDINGS: BRAIN/VENTRICLES: There is no acute intracranial hemorrhage, mass effect or midline shift. No abnormal extra-axial fluid collection. The gray-white differentiation is maintained without evidence of an acute infarct. There is prominence of the ventricles and sulci due to global parenchymal volume loss. There are nonspecific areas of hypoattenuation within the periventricular and subcortical white matter, which likely represent chronic microvascular ischemic change. ORBITS: The visualized portion of the orbits demonstrate no acute abnormality. SINUSES: The visualized paranasal sinuses and mastoid air cells demonstrate no acute abnormality. There is mild mucosal thickening in the maxillary sinuses. SOFT TISSUES/SKULL: No acute abnormality of the visualized skull or soft tissues. No acute intracranial abnormality. CT ABDOMEN PELVIS W IV CONTRAST Additional Contrast? None    Result Date: 1/5/2023  EXAMINATION: CT OF THE ABDOMEN AND PELVIS WITH CONTRAST 1/5/2023 1:19 am TECHNIQUE: CT of the abdomen and pelvis was performed with the administration of intravenous contrast. Multiplanar reformatted images are provided for review. Automated exposure control, iterative reconstruction, and/or weight based adjustment of the mA/kV was utilized to reduce the radiation dose to as low as reasonably achievable.  COMPARISON: 12/06/2022 HISTORY: ORDERING SYSTEM PROVIDED HISTORY: abd pain and fever post colonoscopy today TECHNOLOGIST PROVIDED HISTORY: Reason for exam:->abd pain and fever post colonoscopy today Additional Contrast?->None What reading provider will be dictating this exam?->CRC FINDINGS: Lower Chest: Atelectatic changes seen lung bases bilaterally. Enlargement identified of the ascending thoracic aorta as well as the descending thoracic aorta. Ascending thoracic aorta measures 5.6 x 5.7 cm with descending thoracic aorta measuring 4.3 x 4.3 cm. Organs: Liver is homogeneous in appearance. Low-attenuation focus seen inferiorly within the right lobe too small to characterize. The gallbladder is contracted. No evidence of stones or wall thickening. The pancreas is homogeneous. The spleen is heterogeneous and unremarkable. Both adrenal glands are without evidence of abnormality. Symmetric enhancement of the renal parenchyma. No stones or distension seen in the renal collecting system. GI/Bowel: Stomach is unremarkable in appearance. No wall thickening. The small bowel is within normal limits. No mucosal abnormality. There is a large complex process seen in the right lower quadrant measuring 15.0 x 9.6 cm most compatible with an abscess. This process is increased in size when compared to the prior study. New presence of gas indicating communication with the bowel. Findings may reflect communication with the right sided bowel and cecum. Large carotic mass with super infection is possible. The loops of distal ileum within the right lower quadrant appear to be involved. No evidence of small-bowel obstruction. Pelvis: The bladder is mildly distended. No gross mass or lesion. Simple cystic structure identified within the left adnexa measuring up to 9.1 cm. Recommend follow-up ultrasound in 6-12 months. Gynecologic consultation can be performed. Peritoneum/Retroperitoneum: There is no abdominal retroperitoneal lymphadenopathy. No pelvic adenopathy. Vascular calcifications seen within the abdominal aorta and iliac vessels.  Bones/Soft Tissues: Multilevel degenerative changes seen within the spine. Small right inguinal hernia containing fat only. Slight increase seen in size of the complex process in the right lower quadrant with new presence of gas indicating communication with the bowel. The process appears to be centered around the cecum with involvement as well seen in the ascending colon and several distal small bowel loops. Findings concerning for abscess or necrotic mass with possible superimposed infection. Stable 9.1 cm cystic lesion in the left adnexa in which 6-12 month follow-up can be performed as well as gynecologic consultation as clinically indicated. XR CHEST PORTABLE    Result Date: 1/4/2023  EXAMINATION: ONE XRAY VIEW OF THE CHEST 1/4/2023 7:16 pm COMPARISON: None. HISTORY: ORDERING SYSTEM PROVIDED HISTORY: fever TECHNOLOGIST PROVIDED HISTORY: Reason for exam:->fever What reading provider will be dictating this exam?->CRC FINDINGS: There is cardiomegaly. There is vascular congestion. There is tortuous dilated aortic arch and descending thoracic aorta. There is patchy infiltrates and pleural effusion in the left lung base. Cardiomegaly with the dilated aortic arch and descending thoracic aorta. Consider CT for better assessment. Patchy left basilar infiltrate and effusion likely pneumonia. Colonoscopy    Result Date: 1/4/2023  Site: [address] [place] [address] Patient Name: Lindsey Whelan Procedure Date: 1/4/2023 MRN: [id number] YOB: 1932 Gender: Female Attending MD: Justin Marquesllor Indications:        -  Abnormal CT of the GI tract        - high CEA. Medications:        -  See the Anesthesia note for documentation of the administered medications Complications:        -  No immediate complications.  Estimated Blood Loss:        -  Estimated blood loss was minimal. Procedure:        - The Colonoscope was introduced through the anus and advanced to the cecum,           identified by appendiceal orifice and ileocecal valve. -  The colonoscopy was performed without difficulty. -  The patient tolerated the procedure well. -  The quality of the bowel preparation was good. -  The ileocecal valve, appendiceal orifice, and rectum were photographed. Findings:        -  A 12 mm polyp was found in the rectum. The polyp was pedunculated. The           polyp was removed with a hot snare. Resection and retrieval were complete. -  A 10 mm polyp was found in the descending colon. The polyp was sessile. The polyp was removed with a cold snare. Resection and retrieval were           complete. -  A 7 mm polyp was found in the hepatic flexure. The polyp was sessile. The           polyp was removed with a cold snare. Resection and retrieval were complete. -  A frond-like/villous, fungating, infiltrative, friable and ulcerated           partially obstructing large mass was found in the ascending colon. The mass           was partially circumferential (involving two-thirds of the lumen           circumference). The mass measured 6 cm (in length). This mass was extending           into the hepatic flexure. No spontaneous bleeding was present. Biopsies were           taken with a cold forceps for histology. -  A frond-like/villous, ulcerated, infiltrative ,friable  and polypoid           non-obstructing large mass was found in the cecum. The mass was partially           circumferential (involving one-half of the lumen circumference). No bleeding           was present. Biopsies were taken with a cold forceps for histology. Impression:        -  One 12 mm polyp in the rectum, removed with a hot snare. Resected and           retrieved. -  One 10 mm polyp in the descending colon, removed with a cold snare. Resected and retrieved. -  One 7 mm polyp at the hepatic flexure, removed with a cold snare. Resected           and retrieved. 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 Biopsied.        -  Likely malignant tumor in the cecum. Biopsied. Recommendation:        -  Return to my office as previously scheduled. - surgical consult after discussing with patient and family. Procedure Code(s):        - [id number], Colonoscopy, flexible; with removal of tumor(s), polyp(s), or other           lesion(s) by snare technique        - 57059-05, Colonoscopy, flexible; with biopsy, single or multiple Diagnosis Code(s):        - R93.3, Abnormal findings on diagnostic imaging of other parts of digestive           tract        - D12.8, Benign neoplasm of rectum        - D12.4, Benign neoplasm of descending colon        - D12.3, Benign neoplasm of transverse colon (hepatic flexure or splenic           flexure)        - D49.0, Neoplasm of unspecified behavior of digestive system        - K56.690, Other partial intestinal obstruction       CPT(R) - 2022 copyright [employer] Association. All Rights Reserved. The CPT codes, CCI edits and ICD codes generated are intended as suggestions       and were generated based on input data. These codes are preliminary and upon        review may be revised to meet current compliance and payer requirements. The provider is responsible for the final determination of appropriate codes,       and modifiers. Scope Withdrawal Time:       00:15:21 Signature Name: Marco Ribeiro MD Signature Statement: This document has been electronically signed.  Note Initiated On:1/4/2023 Signature Date:1/4/2023 12:30 PM             Labs:    Labs reviewed and discussed with patient and staff    Lab Results   Component Value Date/Time    POCGLU 115 11/16/2022 04:06 PM    POCGLU 142 11/11/2022 12:32 PM    POCGLU 95 08/11/2022 06:29 AM    POCGLU 113 02/06/2021 04:16 PM    POCGLU 134 02/06/2021 11:21 AM     Lab Results   Component Value Date/Time     01/07/2023 05:12 AM    K 3.6 01/07/2023 05:12 AM     01/07/2023 05:12 AM    CO2 24 01/07/2023 05:12 AM    BUN 17 01/07/2023 05:12 AM    CREATININE 0.47 01/07/2023 05:12 AM    CALCIUM 9.3 01/07/2023 05:12 AM    LABALBU 2.3 01/07/2023 05:12 AM    BILITOT 0.5 01/07/2023 05:12 AM    ALKPHOS 50 01/07/2023 05:12 AM    AST 27 01/07/2023 05:12 AM    ALT 15 01/07/2023 05:12 AM     Lab Results   Component Value Date/Time    WBC 11.3 01/07/2023 05:12 AM    RBC 3.81 01/07/2023 05:12 AM    HGB 10.6 01/07/2023 05:12 AM    HCT 31.7 01/07/2023 05:12 AM    MCV 83.1 01/07/2023 05:12 AM    MCH 27.7 01/07/2023 05:12 AM    MCHC 33.3 01/07/2023 05:12 AM    RDW 16.9 01/07/2023 05:12 AM     01/07/2023 05:12 AM     Lab Results   Component Value Date/Time    VITD25 56.0 10/02/2020 11:59 AM     Lab Results   Component Value Date/Time    COLORU DARK YELLOW 01/04/2023 10:14 PM    NITRU Negative 01/04/2023 10:14 PM    GLUCOSEU Negative 01/04/2023 10:14 PM    KETUA TRACE 01/04/2023 10:14 PM    UROBILINOGEN 0.2 01/04/2023 10:14 PM    BILIRUBINUR SMALL 01/04/2023 10:14 PM     Lab Results   Component Value Date/Time    PROTIME 18.9 11/11/2022 12:30 PM     Lab Results   Component Value Date/Time    INR 1.6 11/11/2022 12:30 PM         I discussed results with patient. Current Rehabilitation Assessments:    Rehabilitation:  Physical Therapy  Bed mobility:  Bed mobility  Bridging: Minimal assistance (01/06/23 1008)  Rolling to Left: Minimal assistance (01/06/23 1008)  Supine to Sit: Moderate assistance (01/06/23 1008)  Sit to Supine: Moderate assistance (01/06/23 1008)  Scooting: Minimal assistance (01/06/23 1008)  Bed Mobility Comments: \hob elevated initially (01/06/23 1008)  Transfers:  Transfers  Sit to Stand:  Moderate Assistance;2 Person Assistance (pt with LOB post requiring lifting assistance intermittently to correct) (01/06/23 1011)  Stand to Sit: Moderate Assistance (poor safety awareness of surface proximity) (01/06/23 1011)  Bed to Chair: Moderate assistance;2 Person Assistance (to [facility] with ww) (01/06/23 1011)  Gait:   Ambulation  Surface: Level tile (01/06/23 1014)  Device: Rolling Walker (01/06/23 1014)  Other Apparatus: O2 (no braces in place for this session) (01/06/23 1014)  Assistance: Moderate assistance;2 Person assistance (01/06/23 1014)  Quality of Gait: posterior LOB, poor walker management, flexed trunk, knees stable (01/06/23 1014)  Distance: 5- STEPS X2 (01/06/23 1014)  Stairs:     W/C mobility:         Occupational therapy:   Hand Dominance: Right  ADL  Feeding: Unable to assess(comment) (01/06/23 1009)  Grooming: Stand by assistance; Increased time to complete (01/06/23 1009)  UE Bathing: Contact guard assistance; Increased time to complete (01/06/23 1009)  LE Bathing: Moderate assistance; Increased time to complete (01/06/23 1009)  UE Dressing: Minimal assistance; Increased time to complete (01/06/23 1009)  LE Dressing: Moderate assistance; Increased time to complete (01/06/23 1009)  Toileting: Maximum assistance; Increased time to complete (01/06/23 1009)  Toilet Transfers  Toilet - Technique: Stand step (01/06/23 1012)  Equipment Used: Standard bedside commode (01/06/23 1012)  Toilet Transfer:  Moderate assistance (01/06/23 1012)            Speech therapy:            Diet/Swallow:                         COGNITION  OT: Cognition Comment: follows one step commands with visual cues and demonstration  SP:             Re-evals pending      Past Medical History:        Diagnosis Date    Ascending aortic aneurysm 6/23/2017    Charcot Arleen Tooth muscular atrophy     CHF (congestive heart failure) (HCC)     Chronic diastolic CHF (congestive heart failure) ([address] 4/1/2022    Depression     Descending aortic aneurysm ([facility] 6/23/2017    Essential hypertension 2/16/2018    Headache     HTN (hypertension)     Impaired mobility and activities of daily living     Lumbar stenosis with neurogenic claudication     Myelopathy ([address]     Osteoarthritis PastSurgical History:        Procedure Laterality Date    COLONOSCOPY N/A 1/4/2023    COLONOSCOPY DIAGNOSTIC performed by Ivan Petersen MD at [facility]    IR INS PICC VAD W SQ PORT GREATER THAN 5  9/23/2022    IR INS PICC VAD W SQ PORT GREATER THAN 5 9/23/2022 [facility] PROCEDURE    JOINT REPLACEMENT Bilateral     knees    OTHER SURGICAL HISTORY Right 07/21/2022    cat scan guided abdominal mass aspiration with drain placement by Dr. Adelso Parham Left 02/25/2021    LEFT PLEURAL CATHETER INSERTION performed by Jesika Palomino MD at [place] [address] Left 01/29/2021    total of 755 cc removed per Dr Sunday Ponce specimen sent to lab         Allergies: Allergies   Allergen Reactions    Latex     Tape Meryle Grit Tape]           CurrentMedications:   Current Facility-Administered Medications: traMADol (ULTRAM) tablet 50 mg, 50 mg, Oral, Q6H PRN  rivaroxaban (XARELTO) tablet 15 mg, 15 mg, Oral, Daily  meropenem (MERREM) 1,000 mg in sodium chloride 0.9 % 100 mL IVPB (mini-bag), 1,000 mg, IntraVENous, Q8H  citalopram (CELEXA) tablet 20 mg, 20 mg, Oral, Daily  digoxin (LANOXIN) tablet 125 mcg, 125 mcg, Oral, Daily  lactobacillus acidophilus (FLORANEX) 2 tablet, 2 tablet, Oral, Daily  meclizine (ANTIVERT) tablet 25 mg, 25 mg, Oral, Q6H PRN  therapeutic multivitamin-minerals 1 tablet, 1 tablet, Oral, Daily  pantoprazole (PROTONIX) tablet 40 mg, 40 mg, Oral, QAM AC  levothyroxine (SYNTHROID) tablet 88 mcg, 88 mcg, Oral, QAM AC  vitamin B-12 (CYANOCOBALAMIN) tablet 1,000 mcg, 1,000 mcg, Oral, Daily  vitamin D (CHOLECALCIFEROL) tablet 2,000 Units, 2,000 Units, Oral, Dinner  sacubitril-valsartan (ENTRESTO) 24-26 MG per tablet 1 tablet, 1 tablet, Oral, BID  carvedilol (COREG) tablet 6.25 mg, 6.25 mg, Oral, BID      Social History:  Social History     Socioeconomic History    Marital status:       Spouse name: Not on file    Number of children: 2    Years of education: Not on file Highest education level: Not on file   Occupational History    Not on file   Tobacco Use    Smoking status: Never    Smokeless tobacco: Never   Vaping Use    Vaping Use: Never used   Substance and Sexual Activity    Alcohol use: No     Alcohol/week: 0.0 standard drinks    Drug use: No    Sexual activity: Not Currently   Other Topics Concern    Not on file   Social History Narrative    , Lives With: Alone, son lives down the street, dtr is in the area    Type of Home: [facility]  in [address]: One level    Home Access: Stairs to enter with rails- Number of Steps: 2- Rails: Both    Bathroom Shower/Tub: Tub/Shower unit, Bathroom Equipment: Grab bars in shower, Shower chair    Home Equipment: Rolling walker, Cane(Pt infrequemtly uses DME for ambulation and prefers to furniture walk in home)    ADL Assistance: Independent, [address]: Independent    Homemaking Responsibilities: Yes    Ambulation Assistance: Independent, Transfer Assistance: Independent    Additional Comments: Son stops over 2 times daily         Social Determinants of Health     Financial Resource Strain: Not on file   Food Insecurity: Not on file   Transportation Needs: Not on file   Physical Activity: Not on file   Stress: Not on file   Social Connections: Not on file   Intimate Partner Violence: Not on file   Housing Stability: Not on file        This history was obtained from family and caregivers and discussed to confirm. Family History:       Problem Relation Age of Onset    Arthritis Mother     Arthritis Father     High Blood Pressure Father     Colon Cancer Brother        Review of Systems:  Review of Systems          Physical Exam:  /67   Pulse (!) 128   Temp 98.2 °F (36.8 °C) (Oral)   Resp 18   Ht 5' (1.524 m)   Wt 211 lb 11.2 oz (96 kg)   SpO2 97%   BMI 41.34 kg/m²      Physical Exam  Ortho Exam  Neurologic Exam         ROS x10:   The patient also complains of severely impaired mobility and activities of daily living. Otherwise no new problems with vision, hearing, nose, mouth, throat, dermal, cardiovascular, GI, , pulmonary, musculoskeletal, psychiatric or neurological. See also Acute Rehab PM&R H&P. Vital signs:  /67   Pulse (!) 128   Temp 98.2 °F (36.8 °C) (Oral)   Resp 18   Ht 5' (1.524 m)   Wt 211 lb 11.2 oz (96 kg)   SpO2 97%   BMI 41.34 kg/m²   I/O:   PO/Intake:  fair PO intake,    diet    Bowel:   continent   Bladder: continent  no  tayolr  General:  Patient is well developed,   adequately nourished, and    well kempt. HEENT:    Pupils equal, hearing intact to loud voice, external inspection of ear and nose benign. Inspection of lips, tongue and gums benign    Musculoskeletal: No significant change in strength or tone. All joints stable. Inspection and palpation of digits and nails show no clubbing, cyanosis or inflammatory conditions. Neuro/Psychiatric: Affect: flat but pleasant. Alert and oriented to person, place and situation with    cues. No significant change in deep tendon reflexes or sensation  Lungs:  Diminished, CTA-B. Respiration effort is   normal at rest.     Heart:   S1 = S2,   RRR. Abdomen:  Soft, tender, no enlargement of liver or spleen. Extremities:  Plus 1 lower extremity edema    Skin:   Intact to general survey,      Rehabilitation:  Physical Therapy:   Bed mobility:  Bed mobility  Bridging: Minimal assistance (01/06/23 1008)  Rolling to Left: Minimal assistance (01/06/23 1008)  Supine to Sit: Moderate assistance (01/06/23 1008)  Sit to Supine: Moderate assistance (01/06/23 1008)  Scooting: Minimal assistance (01/06/23 1008)  Bed Mobility Comments: \hob elevated initially (01/06/23 1008)  Transfers:  Transfers  Sit to Stand:  Moderate Assistance;2 Person Assistance (pt with LOB post requiring lifting assistance intermittently to correct) (01/06/23 1011)  Stand to Sit: Moderate Assistance (poor safety awareness of surface proximity) (01/06/23 1011)  Bed to Chair: Moderate assistance;2 Person Assistance (to [facility] with ww) (01/06/23 1011)  Gait:   Ambulation  Surface: Level tile (01/06/23 1014)  Device: Rolling Walker (01/06/23 1014)  Other Apparatus: O2 (no braces in place for this session) (01/06/23 1014)  Assistance: Moderate assistance;2 Person assistance (01/06/23 1014)  Quality of Gait: posterior LOB, poor walker management, flexed trunk, knees stable (01/06/23 1014)  Distance: 5- STEPS X2 (01/06/23 1014)  Stairs:     W/C mobility:       Occupational Therapy:   Hand Dominance: Right  ADL  Feeding: Unable to assess(comment) (01/06/23 1009)  Grooming: Stand by assistance; Increased time to complete (01/06/23 1009)  UE Bathing: Contact guard assistance; Increased time to complete (01/06/23 1009)  LE Bathing: Moderate assistance; Increased time to complete (01/06/23 1009)  UE Dressing: Minimal assistance; Increased time to complete (01/06/23 1009)  LE Dressing: Moderate assistance; Increased time to complete (01/06/23 1009)  Toileting: Maximum assistance; Increased time to complete (01/06/23 1009)  Toilet Transfers  Toilet - Technique: Stand step (01/06/23 1012)  Equipment Used: Standard bedside commode (01/06/23 1012)  Toilet Transfer:  Moderate assistance (01/06/23 1012)          Speech Therapy:            Diet/Swallow:                      Lab/X-ray studies reviewed, analyzed and discussed with patient and staff:   Recent Results (from the past 24 hour(s))   Basic Metabolic Panel w/ Reflex to MG    Collection Time: 01/07/23  5:12 AM   Result Value Ref Range    Sodium 139 135 - 144 mEq/L    Potassium reflex Magnesium 3.6 3.4 - 4.9 mEq/L    Chloride 106 95 - 107 mEq/L    CO2 24 20 - 31 mEq/L    Anion Gap 9 9 - 15 mEq/L    Glucose 106 (H) 70 - 99 mg/dL    BUN 17 8 - 23 mg/dL    Creatinine 0.47 (L) 0.50 - 0.90 mg/dL    Est, Glom Filt Rate >60.0 >60    Calcium 9.3 8.5 - 9.9 mg/dL   Hepatic function panel    Collection Time: 01/07/23  5:12 AM Result Value Ref Range    Total Protein 5.5 (L) 6.3 - 8.0 g/dL    Albumin 2.3 (L) 3.5 - 4.6 g/dL    Alkaline Phosphatase 50 40 - 130 U/L    ALT 15 0 - 33 U/L    AST 27 0 - 35 U/L    Total Bilirubin 0.5 0.2 - 0.7 mg/dL    Bilirubin, Direct <0.2 0.0 - 0.4 mg/dL    Bilirubin, Indirect see below 0.0 - 0.6 mg/dL   CBC with Auto Differential    Collection Time: 01/07/23  5:12 AM   Result Value Ref Range    WBC 11.3 (H) 4.8 - 10.8 K/uL    RBC 3.81 (L) 4.20 - 5.40 M/uL    Hemoglobin 10.6 (L) 12.0 - 16.0 g/dL    Hematocrit 31.7 (L) 37.0 - 47.0 %    MCV 83.1 79.4 - 94.8 fL    MCH 27.7 27.0 - 31.3 pg    MCHC 33.3 33.0 - 37.0 %    RDW 16.9 (H) 11.5 - 14.5 %    Platelets 158 997 - 824 K/uL    Neutrophils % 82.3 %    Lymphocytes % 7.5 %    Monocytes % 8.1 %    Eosinophils % 1.6 %    Basophils % 0.5 %    Neutrophils Absolute 9.3 (H) 1.4 - 6.5 K/uL    Lymphocytes Absolute 0.9 (L) 1.0 - 4.8 K/uL    Monocytes Absolute 0.9 (H) 0.2 - 0.8 K/uL    Eosinophils Absolute 0.2 0.0 - 0.7 K/uL    Basophils Absolute 0.1 0.0 - 0.2 K/uL   COVID-19, Rapid    Collection Time: 01/07/23 11:11 AM    Specimen: Nasopharyngeal Swab; Nasal swab   Result Value Ref Range    SARS-CoV-2, NAAT Not Detected Not Detected       CT HEAD WO CONTRAST    Result Date: 1/4/2023  EXAMINATION: CT OF THE HEAD WITHOUT CONTRAST  1/4/2023 7:27 pm TECHNIQUE: CT of the head was performed without the administration of intravenous contrast. Automated exposure control, iterative reconstruction, and/or weight based adjustment of the mA/kV was utilized to reduce the radiation dose to as low as reasonably achievable. COMPARISON: None. 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 ->No Decision Support Exception - unselect if not a suspected or confirmed emergency medical condition->Emergency Medical Condition (MA) What reading provider will be dictating this exam?->CRC FINDINGS: BRAIN/VENTRICLES: There is no acute intracranial hemorrhage, mass effect or midline shift. No abnormal extra-axial fluid collection. The gray-white differentiation is maintained without evidence of an acute infarct. There is prominence of the ventricles and sulci due to global parenchymal volume loss. There are nonspecific areas of hypoattenuation within the periventricular and subcortical white matter, which likely represent chronic microvascular ischemic change. ORBITS: The visualized portion of the orbits demonstrate no acute abnormality. SINUSES: The visualized paranasal sinuses and mastoid air cells demonstrate no acute abnormality. There is mild mucosal thickening in the maxillary sinuses. SOFT TISSUES/SKULL: No acute abnormality of the visualized skull or soft tissues. No acute intracranial abnormality. CT ABDOMEN PELVIS W IV CONTRAST Additional Contrast? None    Result Date: 1/5/2023  EXAMINATION: CT OF THE ABDOMEN AND PELVIS WITH CONTRAST 1/5/2023 1:19 am TECHNIQUE: CT of the abdomen and pelvis was performed with the administration of intravenous contrast. Multiplanar reformatted images are provided for review. Automated exposure control, iterative reconstruction, and/or weight based adjustment of the mA/kV was utilized to reduce the radiation dose to as low as reasonably achievable. COMPARISON: 12/06/2022 HISTORY: ORDERING SYSTEM PROVIDED HISTORY: abd pain and fever post colonoscopy today TECHNOLOGIST PROVIDED HISTORY: Reason for exam:->abd pain and fever post colonoscopy today Additional Contrast?->None What reading provider will be dictating this exam?->CRC FINDINGS: Lower Chest: Atelectatic changes seen lung bases bilaterally. Enlargement identified of the ascending thoracic aorta as well as the descending thoracic aorta.   Ascending thoracic aorta measures 5.6 x 5.7 cm with descending thoracic aorta measuring 4.3 x 4.3 cm. Organs: Liver is homogeneous in appearance. Low-attenuation focus seen inferiorly within the right lobe too small to characterize. The gallbladder is contracted. No evidence of stones or wall thickening. The pancreas is homogeneous. The spleen is heterogeneous and unremarkable. Both adrenal glands are without evidence of abnormality. Symmetric enhancement of the renal parenchyma. No stones or distension seen in the renal collecting system. GI/Bowel: Stomach is unremarkable in appearance. No wall thickening. The small bowel is within normal limits. No mucosal abnormality. There is a large complex process seen in the right lower quadrant measuring 15.0 x 9.6 cm most compatible with an abscess. This process is increased in size when compared to the prior study. New presence of gas indicating communication with the bowel. Findings may reflect communication with the right sided bowel and cecum. Large carotic mass with super infection is possible. The loops of distal ileum within the right lower quadrant appear to be involved. No evidence of small-bowel obstruction. Pelvis: The bladder is mildly distended. No gross mass or lesion. Simple cystic structure identified within the left adnexa measuring up to 9.1 cm. Recommend follow-up ultrasound in 6-12 months. Gynecologic consultation can be performed. Peritoneum/Retroperitoneum: There is no abdominal retroperitoneal lymphadenopathy. No pelvic adenopathy. Vascular calcifications seen within the abdominal aorta and iliac vessels. Bones/Soft Tissues: Multilevel degenerative changes seen within the spine. Small right inguinal hernia containing fat only. Slight increase seen in size of the complex process in the right lower quadrant with new presence of gas indicating communication with the bowel.  The process appears to be centered around the cecum with involvement as well seen in the ascending colon and several distal small bowel loops. Findings concerning for abscess or necrotic mass with possible superimposed infection. Stable 9.1 cm cystic lesion in the left adnexa in which 6-12 month follow-up can be performed as well as gynecologic consultation as clinically indicated. XR CHEST PORTABLE    Result Date: 1/4/2023  EXAMINATION: ONE XRAY VIEW OF THE CHEST 1/4/2023 7:16 pm COMPARISON: None. HISTORY: ORDERING SYSTEM PROVIDED HISTORY: fever TECHNOLOGIST PROVIDED HISTORY: Reason for exam:->fever What reading provider will be dictating this exam?->CRC FINDINGS: There is cardiomegaly. There is vascular congestion. There is tortuous dilated aortic arch and descending thoracic aorta. There is patchy infiltrates and pleural effusion in the left lung base.            Diagnostics:    Recent Results (from the past 24 hour(s))   Basic Metabolic Panel w/ Reflex to MG    Collection Time: 01/07/23  5:12 AM   Result Value Ref Range    Sodium 139 135 - 144 mEq/L    Potassium reflex Magnesium 3.6 3.4 - 4.9 mEq/L    Chloride 106 95 - 107 mEq/L    CO2 24 20 - 31 mEq/L    Anion Gap 9 9 - 15 mEq/L    Glucose 106 (H) 70 - 99 mg/dL    BUN 17 8 - 23 mg/dL    Creatinine 0.47 (L) 0.50 - 0.90 mg/dL    Est, Glom Filt Rate >60.0 >60    Calcium 9.3 8.5 - 9.9 mg/dL   Hepatic function panel    Collection Time: 01/07/23  5:12 AM   Result Value Ref Range    Total Protein 5.5 (L) 6.3 - 8.0 g/dL    Albumin 2.3 (L) 3.5 - 4.6 g/dL    Alkaline Phosphatase 50 40 - 130 U/L    ALT 15 0 - 33 U/L    AST 27 0 - 35 U/L    Total Bilirubin 0.5 0.2 - 0.7 mg/dL    Bilirubin, Direct <0.2 0.0 - 0.4 mg/dL    Bilirubin, Indirect see below 0.0 - 0.6 mg/dL   CBC with Auto Differential    Collection Time: 01/07/23  5:12 AM   Result Value Ref Range    WBC 11.3 (H) 4.8 - 10.8 K/uL    RBC 3.81 (L) 4.20 - 5.40 M/uL    Hemoglobin 10.6 (L) 12.0 - 16.0 g/dL    Hematocrit 31.7 (L) 37.0 - 47.0 %    MCV 83.1 79.4 - 94.8 fL    MCH 27.7 27.0 - 31.3 pg    MCHC 33.3 33.0 - 37.0 %    RDW 16.9 (H) 11.5 - 14.5 %    Platelets 645 133 - 474 K/uL    Neutrophils % 82.3 %    Lymphocytes % 7.5 %    Monocytes % 8.1 %    Eosinophils % 1.6 %    Basophils % 0.5 %    Neutrophils Absolute 9.3 (H) 1.4 - 6.5 K/uL    Lymphocytes Absolute 0.9 (L) 1.0 - 4.8 K/uL    Monocytes Absolute 0.9 (H) 0.2 - 0.8 K/uL    Eosinophils Absolute 0.2 0.0 - 0.7 K/uL    Basophils Absolute 0.1 0.0 - 0.2 K/uL   COVID-19, Rapid    Collection Time: 01/07/23 11:11 AM    Specimen: Nasopharyngeal Swab; Nasal swab   Result Value Ref Range    SARS-CoV-2, NAAT Not Detected Not Detected              Impression:    Impaired mobility and ADLs due to encephalopathy secondary to recurrent bacteremia with tumor, biopsy, poor prognosis given inability to remove the tumor, recurrent septicemia, severe pain and recurrent hospitalization  Factors favoring recovery include:  near independent premorbid function        Complex Active General Medical Issues that complicate care and require daily medical supervision: 1. Principal Problem:    Gram-negative bacteremia  Active Problems:    Colonic mass    Weakness    Encounter for hospice care discussion    Pneumonia of left lower lobe due to infectious organism    H/O abdominal abscess    Generalized weakness  Resolved Problems:    * No resolved hospital problems.  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    Headache    Depression    Thoracic aortic aneurysm without rupture    Descending aortic aneurysm (HCC)    Vertigo    Shortness of breath    Essential hypertension    Acute on chronic combined systolic and diastolic CHF (congestive heart failure) ([facility])    Gait abnormality    A-fib (HCC)    Pleural effusion Hypoxia    Acute pulmonary edema (HCC)    Acute on chronic combined systolic (congestive) and diastolic (congestive) heart failure (HCC)    Acute cystitis with hematuria    Chronic diastolic CHF (congestive heart failure) (HCC)    Right lower quadrant abdominal pain    Hyponatremia    Hypokalemia    Anemia    CHF (congestive heart failure) (HCC)    Hypothyroid    Colonic mass    Central retinal vein occlusion, left eye, stable    Hx of drainage of abscess    Abdominal pain    Streptococcus viridans infection    Aortic thrombus (HCC)    Intra-abdominal abscess (HCC)    Adnexal mass    Change or removal of drains    Difficulty in walking    Muscle weakness (generalized)    Acute diastolic HF (heart failure) (HCC)    CHF (congestive heart failure), NYHA class I, acute on chronic, combined ([facility] [address]    Gastro-esophageal reflux disease without esophagitis    Intracardiac thrombosis, not elsewhere classified    Impaired mobility and ADLs    Palliative care encounter    Advanced care planning/counseling discussion    Goals of care, counseling/discussion    Adenomatous polyp of descending colon    Adenomatous polyp of colon    Weakness    Encounter for hospice care discussion    Pneumonia of left lower lobe due to infectious organism    Gram-negative bacteremia    H/O abdominal abscess    Generalized weakness               Recommendations:    Considering all of the factors above including the patient's current medical status, social status/home environment, their functional needs, and their ability to participate in a therapy program, I feel that they would best be served at:          skilled rehabilitation level of care. It is my opinion that they will NOT be able to tolerate and benefit from 3 hours of therapy a day. I reviewed the various other options re: levels of care with the patient and family. Specialized nursing care to focus on:   Bowel and bladder issues-Monitor for urinary retention-check PVRs, bladder scan--cath if no void. Wound management re   -pressure relief protocols-side to side turns  IV medication administration      Monitor endurance and if necessary spread therapy out over a 7-day window-adding scheduled rest breaks when needed. Focus on energy conservation. Monitor heart rate and   cardiac medications effects on heart rate and blood pressure before, during and after therapy. Progress toward endurance training with pulse ox monitoring for saturation and heart rate. continue to monitor closely for dehydration-- Improve hydration and nutrition by adding Vitamin B12 shot times one, adding Protein supplements and push PO fluids. Treat and monitor for higher level cognitive deficits, focus on difficulty with sequencing and problem-solving. Focus on higher-level balance and falls risk issues focusing on balance training and monitoring for orthostasis. Above recommendations are indicated to address medical complexity and need for appropriate rehab services. Will tailor individual care and rehab plan per individuals needs re  . Focus of today's plan-recommend skilled with palliative care and hospice referral there are no clear rehab goals and she is unlikely able to tolerate the 3 hours  Patient has become critically ill on our floor before and needed emergency surgical evaluation. If the patient is no longer a surgical candidate it is unclear as to what we would do in an emergency situation. Recommend oncology consultation and lengthy discussion with family as to plan of action and intervention from medical services.       Required Certification Data (potential inpatient rehabilitation facility patient's only)    Deficits:weakness, nutrition, mobility, impaired gait, high risk for falls, frequent falls, decreased endurance, deconditioning , debility, cardiovascular compromise, balance, ADL's, and cognitive deficits     Disability:mobility, locomotion, self care, bowel/ bladder status, and cognitive    Potential barriers to progress/discharge:complex medical conditions, severe pain, and bowel/bladder dysfunction         It was my pleasure to evaluate [address] today. Please call [phone] with questions.     Marilyn Bueno MD

## 2023-01-07 NOTE — CARE COORDINATION
PLAN IS FOR THE PATIENT TO D/C TO [facility] ACUTE REHAB TODAY. PATIENT WILL GO TO . PATIENT WILL NEED A NEG COVID AND D/C READMIT ORDERS. CM TO FOLLOW FOR ANY NEW D/C NEEDS OR CHANGES TO THE D/C PLAN.

## 2023-01-07 NOTE — CARE COORDINATION
A message came up when I logged onto the chart stating, \"This is a hospice patient do not go any further\". Spoke to son, Raymond Christianson and daughter-in-law regarding hospice and/or palliative care. Asked if the patient was hospice and they stated, \"Absolutely not\". Spoke to [facility], Rehab supervisor and she stated that the Hospice NP did speak with the family and the patient is not Rehab appropriate. Explained to son and daughter-in-law that after discussing with Dr. Andrea Keen we do not feel that the patient will be able to tolerate 3 hours of intensive therapy which is required on the Rehab floor. Discussed skilled. They both stated that she went to [facility] after discharge from [address] [place] [address] and did very well. They agreed the patient would not be able to tolerate 3 hours of mandatory therapy per day and they would like her to go to [facility] TER[facility] ACUTE C[facility]. They firmly stated that they wanted it charted that the patient is not hospice or palliative care. Assure them that I would. Notified ERIC Rodas that the patient will not be coming to Rehab and that the family would like [facility] [place] [facility]. Also advised her that the patient is not under hospice or palliative care.  Electronically signed by Tamera Smart RN on 1/7/23 at 3:52 PM EST

## 2023-01-07 NOTE — FLOWSHEET NOTE
Registered Nurse Shift Summary  23   Patient Name: Roshan Camargo  Attending Provider: Michel Marques DO      Admit Date: 2023  MRN: [id number]                           : 1932  Limited      7:33 AM Assumed Care of Patient. Documentation initiated as per policy / SOP. Report Received from [facility].    1130: Patient's son at bedside. All questions regarding his mom's care were answered. Electronically signed by Yoseph Ferris RN on 2023 at 11:42 AM     Assessment Complete, please see flow sheets. Labs, orders, plan of care and meds reviewed.      Labs:   Recent Labs     23   WBC 17.3* 15.9* 11.3*   HGB 11.2* 11.4* 10.6*   HCT 34.6* 34.3* 31.7*    209 198     Recent Labs     23  0520 23  0512   * 136 139   K 4.1 3.3* 3.6   CL 97 101 106   CO2 22 23 24   BUN 24* 21 17   CREATININE 0.88 0.57 0.47*   CALCIUM 9.3 9.3 9.3     Recent Labs     23  0520 23  0512   AST 28 25 27   ALT 14 13 15   BILIDIR <0.2 0.3 <0.2   BILITOT 0.6 0.7 0.5   ALKPHOS 52 54 50         Last set of vitals:  Enc Vitals  BP: 109/67 (23)  Heart Rate: (!) 128 (23)  Resp: 18 (23)  Temp: 98.2 °F (36.8 °C) (23)  Temp Source: Oral (23)  SpO2: 97 % (23)  Weight: 211 lb 11.2 oz (96 kg) (23 1032)  Height: 5' (152.4 cm) (23 1819)

## 2023-01-07 NOTE — CARE COORDINATION
Spoke to patient regarding Acute Inpatient Rehab referral. Discussed that patient was on Rehab in November and did very well. Reminded patient of ARF goals, mission, purpose, daily activity, and discharge planning. Asked if she wanted to go to ARF when discharged from medical and she gave me a big smile and nodded her \"yes\". Asked what her main goal on Rehab would be and she stated, \"Stronger\". Using Freedom of Choice, the patient chose [address] when discharged from medical. Informed patient that I would call her son and let him know that she has been accepted for ARF. She stated, \"Thank you\". Called Mira, patient's son and informed him that his mother has been accepted on Rehab and there is a possibility that she may admit today. He stated, \"Thank you, thank you. \" Will continue to monitor for discharge to ARF today.  Electronically signed by Viki Fowler RN on 1/7/23 at 10:48 AM EST

## 2023-01-07 NOTE — PROGRESS NOTES
Infectious Diseases Inpatient Progress Note          HISTORY OF PRESENT ILLNESS:  Follow up bacteremia/ abdominal abscess/colon cancer on Meropenem, well tolerated. Patient refused cancer Rx. Going to Rehab. Current Medications:     rivaroxaban  15 mg Oral Daily    meropenem  1,000 mg IntraVENous Q8H    citalopram  20 mg Oral Daily    digoxin  125 mcg Oral Daily    lactobacillus acidophilus  2 tablet Oral Daily    therapeutic multivitamin-minerals  1 tablet Oral Daily    pantoprazole  40 mg Oral QAM AC    levothyroxine  88 mcg Oral QAM AC    vitamin B-12  1,000 mcg Oral Daily    Vitamin D  2,000 Units Oral Dinner    sacubitril-valsartan  1 tablet Oral BID    carvedilol  6.25 mg Oral BID       Allergies:  Latex and Tape [adhesive tape]      Review of Systems  No Pain  No GI sxs  No cough/SOB  No dysuria  No rash  No fevers    Physical Exam  Vitals:    01/05/23 2058 01/06/23 1032 01/06/23 1911 01/07/23 0716   BP: 112/71 (!) 156/111 (!) 88/43 109/67   Pulse: (!) 108 62 70 (!) 128   Resp: 18 20 18 18   Temp: 98.1 °F (36.7 °C) 97.3 °F (36.3 °C) 97.5 °F (36.4 °C) 98.2 °F (36.8 °C)   TempSrc: Oral Oral Oral Oral   SpO2: 95% 96% 96% 97%   Weight:  211 lb 11.2 oz (96 kg)     Height:         General Appearance: alert , well-developed and well-nourished, in no acute distress  Skin: warm and dry, no rash. Head: normocephalic and atraumatic  Eyes: anicteric sclerae  ENT: oropharynx clear and moist with normal mucous membranes.  No oral thrush  Lungs: normal respiratory effort, clear  Heart: no Murmur  Abdomen: soft, no tenderness, Large RLQ abdominal mass  No leg edema  No erythema, no tenderness      DATA:    Lab Results   Component Value Date    WBC 11.3 (H) 01/07/2023    HGB 10.6 (L) 01/07/2023    HCT 31.7 (L) 01/07/2023    MCV 83.1 01/07/2023     01/07/2023     Lab Results   Component Value Date    CREATININE 0.47 (L) 01/07/2023    BUN 17 01/07/2023     01/07/2023    K 3.6 01/07/2023     01/07/2023 CO2 24 01/07/2023       Hepatic Function Panel:  Lab Results   Component Value Date/Time    ALKPHOS 50 01/07/2023 05:12 AM    ALT 15 01/07/2023 05:12 AM    AST 27 01/07/2023 05:12 AM    PROT 5.5 01/07/2023 05:12 AM    BILITOT 0.5 01/07/2023 05:12 AM    BILIDIR <0.2 01/07/2023 05:12 AM    IBILI see below 01/07/2023 05:12 AM    LABALBU 2.3 01/07/2023 05:12 AM       Microbiology:   Recent Labs     01/05/23  0348   BC Gram stain aerobic bottle  Gram positive cocci in clusters-resembling Staph  1 out of 2 blood cultures  Gram stain anaerobic bottle  Gram negative rods  1 out of 2 blood cultures  Further results to follow  Further testing performed at [place] [address]  *  No Growth to date. Any change in status will be called.      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    Headache    Depression    Thoracic aortic aneurysm without rupture    Descending aortic aneurysm (HCC)    Vertigo    Shortness of breath    Essential hypertension    Acute on chronic combined systolic and diastolic CHF (congestive heart failure) ([facility])    Gait abnormality    A-fib (HCC)    Pleural effusion    Hypoxia    Acute pulmonary edema (HCC)    Acute on chronic combined systolic (congestive) and diastolic (congestive) heart failure (HCC)    Acute cystitis with hematuria    Chronic diastolic CHF (congestive heart failure) (HCC)    Right lower quadrant abdominal pain    Hyponatremia    Hypokalemia    Anemia    CHF (congestive heart failure) (HCC)    Hypothyroid    Colonic mass    Central retinal vein occlusion, left eye, stable    Hx of drainage of abscess    Abdominal pain Streptococcus viridans infection    Aortic thrombus (HCC)    Intra-abdominal abscess (HCC)    Adnexal mass    Change or removal of drains    Difficulty in walking    Muscle weakness (generalized)    Acute diastolic HF (heart failure) (HCC)    CHF (congestive heart failure), NYHA class I, acute on chronic, combined ([facility] [address]    Gastro-esophageal reflux disease without esophagitis    Intracardiac thrombosis, not elsewhere classified    Impaired mobility and ADLs    Palliative care encounter    Advanced care planning/counseling discussion    Goals of care, counseling/discussion    Adenomatous polyp of descending colon    Adenomatous polyp of colon    Weakness    Encounter for hospice care discussion    Pneumonia of left lower lobe due to infectious organism    Gram-negative bacteremia    H/O abdominal abscess    Generalized weakness       PLAN:  IV Meropenem x 2 weeks  Transfer to Rehab  F/U CBC BMP  F/U repeat Blood Cx    Discussed with patient and RN    Eboni Fong MD

## 2023-01-08 PROCEDURE — 97116 GAIT TRAINING THERAPY: CPT

## 2023-01-08 PROCEDURE — 2700000000 HC OXYGEN THERAPY PER DAY

## 2023-01-08 PROCEDURE — 6360000002 HC RX W HCPCS: Performed by: INTERNAL MEDICINE

## 2023-01-08 PROCEDURE — 99232 SBSQ HOSP IP/OBS MODERATE 35: CPT | Performed by: INTERNAL MEDICINE

## 2023-01-08 PROCEDURE — 6370000000 HC RX 637 (ALT 250 FOR IP): Performed by: INTERNAL MEDICINE

## 2023-01-08 PROCEDURE — 2580000003 HC RX 258: Performed by: INTERNAL MEDICINE

## 2023-01-08 PROCEDURE — 1210000000 HC MED SURG R&B

## 2023-01-08 RX ADMIN — SACUBITRIL AND VALSARTAN 1 TABLET: 24; 26 TABLET, FILM COATED ORAL at 08:58

## 2023-01-08 RX ADMIN — TRAMADOL HYDROCHLORIDE 50 MG: 50 TABLET ORAL at 18:26

## 2023-01-08 RX ADMIN — DIGOXIN 125 MCG: 125 TABLET ORAL at 09:00

## 2023-01-08 RX ADMIN — TRAMADOL HYDROCHLORIDE 50 MG: 50 TABLET ORAL at 09:00

## 2023-01-08 RX ADMIN — CYANOCOBALAMIN TAB 500 MCG 1000 MCG: 500 TAB at 08:58

## 2023-01-08 RX ADMIN — RIVAROXABAN 15 MG: 15 TABLET, FILM COATED ORAL at 18:25

## 2023-01-08 RX ADMIN — Medication 2 TABLET: at 08:58

## 2023-01-08 RX ADMIN — CARVEDILOL 6.25 MG: 6.25 TABLET, FILM COATED ORAL at 09:00

## 2023-01-08 RX ADMIN — MEROPENEM 1000 MG: 1 INJECTION, POWDER, FOR SOLUTION INTRAVENOUS at 12:53

## 2023-01-08 RX ADMIN — CITALOPRAM HYDROBROMIDE 20 MG: 20 TABLET ORAL at 09:00

## 2023-01-08 RX ADMIN — PANTOPRAZOLE SODIUM 40 MG: 40 TABLET, DELAYED RELEASE ORAL at 05:20

## 2023-01-08 RX ADMIN — LEVOTHYROXINE SODIUM 88 MCG: 0.09 TABLET ORAL at 05:20

## 2023-01-08 RX ADMIN — Medication 1 TABLET: at 08:58

## 2023-01-08 RX ADMIN — Medication 2000 UNITS: at 18:25

## 2023-01-08 RX ADMIN — MEROPENEM 1000 MG: 1 INJECTION, POWDER, FOR SOLUTION INTRAVENOUS at 21:52

## 2023-01-08 RX ADMIN — MEROPENEM 1000 MG: 1 INJECTION, POWDER, FOR SOLUTION INTRAVENOUS at 05:19

## 2023-01-08 ASSESSMENT — PAIN DESCRIPTION - ORIENTATION: ORIENTATION: RIGHT;UPPER

## 2023-01-08 ASSESSMENT — PAIN DESCRIPTION - LOCATION
LOCATION: ABDOMEN
LOCATION: ABDOMEN

## 2023-01-08 ASSESSMENT — PAIN DESCRIPTION - DESCRIPTORS: DESCRIPTORS: PRESSURE

## 2023-01-08 ASSESSMENT — PAIN - FUNCTIONAL ASSESSMENT: PAIN_FUNCTIONAL_ASSESSMENT: PREVENTS OR INTERFERES SOME ACTIVE ACTIVITIES AND ADLS

## 2023-01-08 NOTE — PROGRESS NOTES
Physical Therapy Med Surg Daily Treatment Note  Facility/Department: [facility]  [facility]       NAME: Farnaz Rodriguez  : 1932 (80 y.o.)  MRN: [id number]  CODE STATUS: Limited    Date of Service: 2023    Patient Diagnosis(es): Dehydration [E86.0]  Weakness [R53.1]  Generalized weakness [R53.1]  Acute nonintractable headache, unspecified headache type [R51.9]  Pneumonia of left lower lobe due to infectious organism [J18.9]  Colonic mass [K63.89]   Chief Complaint   Patient presents with    Headache     Headache and weakness since colonoscopy this morning.      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     Encounter for hospice care discussion 2023    Pneumonia of left lower lobe due to infectious organism 2023    Gram-negative bacteremia 2023    H/O abdominal abscess 2023    Generalized weakness 2023    Weakness 2023    Adenomatous polyp of descending colon 2023    Adenomatous polyp of colon 2023    Impaired mobility and ADLs 2022    Palliative care encounter 2022    Advanced care planning/counseling discussion 2022    Goals of care, counseling/discussion     Acute diastolic HF (heart failure) ([address] 2022    CHF (congestive heart failure), NYHA class I, acute on chronic, combined ([address] 2022    Difficulty in walking 2022    Muscle weakness (generalized) 2022    Gastro-esophageal reflux disease without esophagitis 2022    Intracardiac thrombosis, 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    Change or removal of drains 2022    Adnexal mass 2022    Intra-abdominal abscess ([address] 2022    Aortic thrombus ([address] 2022    Hx of drainage of abscess 2022    Abdominal pain 2022    Streptococcus viridans infection 2022    Colonic mass 2022    Right lower quadrant abdominal pain 07/18/2022    Hyponatremia 07/18/2022    Hypokalemia 07/18/2022    Anemia 07/18/2022    CHF (congestive heart failure) ([address] 07/18/2022    Hypothyroid 07/18/2022    Central retinal vein occlusion, left eye, stable 04/12/2021    Lumbar stenosis with neurogenic claudication 06/02/2016    Chronic diastolic CHF (congestive heart failure) ([address] 04/01/2022    Acute cystitis with hematuria 02/21/2021    Acute on chronic combined systolic (congestive) and diastolic (congestive) heart failure ([address] 02/09/2021    Pleural effusion 02/08/2021    Hypoxia     Acute pulmonary edema (HCC)     Gait abnormality 01/27/2021    Acute on chronic combined systolic and diastolic CHF (congestive heart failure) ([address] 01/25/2021    Essential hypertension 02/16/2018    Shortness of breath     Vertigo     Thoracic aortic aneurysm without rupture 06/23/2017    Descending aortic aneurysm ([address] 06/23/2017    Osteoarthritis     Myelopathy ([address]     Headache     Depression     Acquired scoliosis 06/02/2016    Osteoporosis 06/02/2016    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    Excess weight 06/02/2016    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/02/2016        Past Medical History:   Diagnosis Date    Ascending aortic aneurysm 6/23/2017    Charcot Arleen Tooth muscular atrophy     CHF (congestive heart failure) (HCC)     Chronic diastolic CHF (congestive heart failure) ([address] 4/1/2022    Depression     Descending aortic aneurysm ([address] 6/23/2017    Essential hypertension 2/16/2018    Headache     HTN (hypertension)     Impaired mobility and activities of daily living     Lumbar stenosis with neurogenic claudication     Myelopathy ([address]     Osteoarthritis      Past Surgical History:   Procedure Laterality Date    COLONOSCOPY N/A 1/4/2023    COLONOSCOPY DIAGNOSTIC performed by Yara Todd MD at [facility]    IR INS PICC VAD W SQ PORT GREATER THAN 5  9/23/2022    IR INS PICC VAD W SQ PORT GREATER THAN 5 9/23/2022 [facility] SPECIAL PROCEDURE    JOINT REPLACEMENT Bilateral     knees    OTHER SURGICAL HISTORY Right 07/21/2022    cat scan guided abdominal mass aspiration with drain placement by Dr. Gaby Cheng Left 02/25/2021    LEFT PLEURAL CATHETER INSERTION performed by Willian Lipscomb MD at [place] [address] Left 01/29/2021    total of 755 cc removed per Dr Pop Salt specimen sent to lab       Chart Reviewed: Yes  Other (Comment): Pt known to this therapist. This pt primarily speaks a Serbian dialect that is not available for her to utilize. Pt understands simple English combined with demonstration. Restrictions:  Restrictions/Precautions: Fall Risk    SUBJECTIVE:   Subjective: Patient reports being uncomfortable laying in bed. Pain  Pain: LBP- unable to rate; denies post pain    OBJECTIVE:        Bed mobility  Bridging: Contact guard assistance  Supine to Sit: Minimal assistance;Contact guard assistance  Scooting: Contact guard assistance  Bed Mobility Comments: HOB elevated    Transfers  Sit to Stand: Minimal Assistance  Stand to Sit: Minimal Assistance    Ambulation  Surface: Level tile  Device: Rolling Walker  Other Apparatus: O2 (3L)  Quality of Gait: Flexed posture, decreased step length/foot clearance, no LOB  Distance: 4ft to chair                                         ASSESSMENT   Body Structures, Functions, Activity Limitations Requiring Skilled Therapeutic Intervention: Decreased functional mobility ; Decreased endurance;Decreased balance;Decreased safe awareness;Decreased cognition;Decreased posture  Assessment: Patient without LOB ambulating short distance to the chair. No significant difficulty completing bed mobility though HOB slightly elevated.  Denies further activity due to wanting to eat breakfast.     Discharge Recommendations:  Continue to assess pending progress         Goals  Short Term Goals  Short Term Goal 1: SBA bed mobility  Short Term Goal 2: SBA sit to stand  Short Term Goal 3: SBA gait with ww 25 feet  Short Term Goal 4: pt able to stand with ww 2 min with SBA    PLAN    General Plan: 1 time a day 3-6 times a week  Safety Devices  Type of Devices: Call light within reach, Chair alarm in place, All fall risk precautions in place     AMPAC (6 CLICK) BASIC MOBILITY  AM-PAC Inpatient Mobility Raw Score : 13     Therapy Time   Individual   Time In 0840   Time Out 0857   Minutes 17     Timed Code Treatment Minutes: 17 Minutes     Trans:8  Hellen Sequeira PTA, 01/08/23 at 9:01 AM     Electronically signed by Ming Izquierdo PTA on 1/8/2023 at 9:01 AM      Definitions for assistance levels  Independent = pt does not require any physical supervision or assistance from another person for activity completion. Device may be needed.   Stand by assistance = pt requires verbal cues or instructions from another person, close to but not touching, to perform the activity  Minimal assistance= pt performs 75% or more of the activity; assistance is required to complete the activity  Moderate assistance= pt performs 50% of the activity; assistance is required to complete the activity  Maximal assistance = pt performs 25% of the activity; assistance is required to complete the activity  Dependent = pt requires total physical assistance to accomplish the task

## 2023-01-08 NOTE — PROGRESS NOTES
Physician Progress Note    1/8/2023   11:55 AM    Name:  Miladis Small  MRN:    [id number]      Day: 3     Admit Date: 1/4/2023  6:18 PM  PCP: Hui Dalton MD    Code Status:  Limited    Subjective:     Denies complaints    Current Facility-Administered Medications   Medication Dose Route Frequency Provider Last Rate Last Admin    traMADol (ULTRAM) tablet 50 mg  50 mg Oral Q6H PRN Earmon Dayne, DO   50 mg at 01/08/23 0900    rivaroxaban (XARELTO) tablet 15 mg  15 mg Oral Daily Earmon Dayne, DO   15 mg at 01/07/23 1659    meropenem (MERREM) 1,000 mg in sodium chloride 0.9 % 100 mL IVPB (mini-bag)  1,000 mg IntraVENous Q8H Avis Casas, DO   Stopped at 01/08/23 0550    citalopram (CELEXA) tablet 20 mg  20 mg Oral Daily Earmon Dayne, DO   20 mg at 01/08/23 0900    digoxin (LANOXIN) tablet 125 mcg  125 mcg Oral Daily Earmon Dayne, DO   125 mcg at 01/08/23 0900    lactobacillus acidophilus [facility]) 2 tablet  2 tablet Oral Daily Earmon Dayne, DO   2 tablet at 01/08/23 0858    meclizine (ANTIVERT) tablet 25 mg  25 mg Oral Q6H PRN Earmon Dayne, DO        therapeutic multivitamin-minerals 1 tablet  1 tablet Oral Daily Earmon Dayne, DO   1 tablet at 01/08/23 0858    pantoprazole (PROTONIX) tablet 40 mg  40 mg Oral QAM AC Mick R Mae, DO   40 mg at 01/08/23 0520    levothyroxine (SYNTHROID) tablet 88 mcg  88 mcg Oral QAM AC Mick R Mae, DO   88 mcg at 01/08/23 0520    vitamin B-12 (CYANOCOBALAMIN) tablet 1,000 mcg  1,000 mcg Oral Daily Earmon Dayne, DO   1,000 mcg at 01/08/23 0858    vitamin D (CHOLECALCIFEROL) tablet 2,000 Units  2,000 Units Oral Dinner Earmon Dayne, DO   2,000 Units at 01/07/23 1659    sacubitril-valsartan (ENTRESTO) 24-26 MG per tablet 1 tablet  1 tablet Oral BID Rashad Oscar, DO   1 tablet at 01/08/23 0858    carvedilol (COREG) tablet 6.25 mg  6.25 mg Oral BID Rashad Dayne, DO   6.25 mg at 01/08/23 0900       Physical Examination:      Vitals:  BP 111/76   Pulse 71   Temp 97.5 °F (36.4 °C) (Oral)   Resp 16   Ht 5' (1.524 m)   Wt 214 lb (97.1 kg)   SpO2 98%   BMI 41.79 kg/m²   Temp (24hrs), Av.6 °F (36.4 °C), Min:97.5 °F (36.4 °C), Max:97.8 °F (36.6 °C)      General appearance: Elderly, tired appearing, and chronically ill-appearing. Lungs: clear to auscultation bilaterally, normal effort  Heart: regular rate and rhythm, no murmur  Abdomen: Fullness and dullness to percussion right lower quadrant. Mild tenderness to palpation. No rebound tenderness or guarding. Extremities: no edema, redness, tenderness in the calves. Cap refill <2s    Data:     Labs:  Recent Labs     23   WBC 15.9* 11.3*   HGB 11.4* 10.6*    198     Recent Labs     23    139   K 3.3* 3.6    106   CO2 23 24   BUN 21 17   CREATININE 0.57 0.47*   GLUCOSE 97 106*     Recent Labs     2320 23  05   AST 25 27   ALT 13 15   BILITOT 0.7 0.5   ALKPHOS 54 50     CT Abd/Pelvis:  Slight increase seen in size of the complex process in the right lower   quadrant with new presence of gas indicating communication with the bowel. The process appears to be centered around the cecum with involvement as well   seen in the ascending colon and several distal small bowel loops. Findings   concerning for abscess or necrotic mass with possible superimposed infection. Stable 9.1 cm cystic lesion in the left adnexa in which 6-12 month follow-up   can be performed as well as gynecologic consultation as clinically indicated. Assessment and Plan:        1. Sepsis secondary to GNR bacteremia related to complex mass in the right lower quadrant  -IV meropenem. Infectious disease following. Will need midline or PICC line. Follow final culture data  -Follow surgical pathology from colonoscopy. Noted elevated CEA (19.7)  -Awaiting SNF placement.   Palliative care should continue to follow    Class III obesity  Hypertension  Chronic combined heart failure    Diet: ADULT DIET;  Full Liquid  ADULT ORAL NUTRITION SUPPLEMENT; Breakfast, Dinner, Lunch; Diabetic Oral Supplement  Ppx: lovenox  Limited    >25 minutes in total care time    Electronically signed by Rosita Jones DO on 1/8/2023 at 11:55 AM

## 2023-01-08 NOTE — PROGRESS NOTES
Infectious Diseases Inpatient Progress Note          HISTORY OF PRESENT ILLNESS:  Follow up anaerobic bacteremia/ abdominal abscess/colon cancer on Meropenem, well tolerated. Patient refused cancer Rx. Going to Rehab. Patient did not qualify for inpatient rehab. She remains to be very weak. Complaining of dry mouth. Decreased appetite. Denies any pain  Current Medications:     rivaroxaban  15 mg Oral Daily    meropenem  1,000 mg IntraVENous Q8H    citalopram  20 mg Oral Daily    digoxin  125 mcg Oral Daily    lactobacillus acidophilus  2 tablet Oral Daily    therapeutic multivitamin-minerals  1 tablet Oral Daily    pantoprazole  40 mg Oral QAM AC    levothyroxine  88 mcg Oral QAM AC    vitamin B-12  1,000 mcg Oral Daily    Vitamin D  2,000 Units Oral Dinner    sacubitril-valsartan  1 tablet Oral BID    carvedilol  6.25 mg Oral BID       Allergies:  Latex and Tape [adhesive tape]      Review of Systems  No Pain  No GI sxs  No cough/SOB  No dysuria  No rash  No fevers    Physical Exam  Vitals:    01/07/23 1958 01/07/23 2353 01/08/23 0800 01/08/23 0810   BP: (!) 97/57  111/76 111/76   Pulse: 71  72 71   Resp: 18  16    Temp: 97.8 °F (36.6 °C)  97.5 °F (36.4 °C) 97.5 °F (36.4 °C)   TempSrc: Oral Oral Oral    SpO2: 96%  98% 98%   Weight:       Height:         General Appearance: alert , well-developed and well-nourished, in no acute distress  Skin: warm and dry, no rash. Head: normocephalic and atraumatic  Eyes: anicteric sclerae  ENT: oropharynx clear and moist with normal mucous membranes.  No oral thrush  Lungs: normal respiratory effort, clear  Heart: no Murmur  Abdomen: soft, no tenderness, Large RLQ abdominal mass  No leg edema  No erythema, no tenderness      DATA:    Lab Results   Component Value Date    WBC 11.3 (H) 01/07/2023    HGB 10.6 (L) 01/07/2023    HCT 31.7 (L) 01/07/2023    MCV 83.1 01/07/2023     01/07/2023     Lab Results   Component Value Date    CREATININE 0.47 (L) 01/07/2023    BUN 17 01/07/2023     01/07/2023    K 3.6 01/07/2023     01/07/2023    CO2 24 01/07/2023       Hepatic Function Panel:  Lab Results   Component Value Date/Time    ALKPHOS 50 01/07/2023 05:12 AM    ALT 15 01/07/2023 05:12 AM    AST 27 01/07/2023 05:12 AM    PROT 5.5 01/07/2023 05:12 AM    BILITOT 0.5 01/07/2023 05:12 AM    BILIDIR <0.2 01/07/2023 05:12 AM    IBILI see below 01/07/2023 05:12 AM    LABALBU 2.3 01/07/2023 05:12 AM       Microbiology:   Further testing performed at [place] [address]   Blood Culture 2 [[id number]] (Abnormal) Collected: 01/05/23 0348   Order Status: Completed Specimen: Blood Updated: 01/07/23 0641    Blood Culture, Routine -- Abnormal     Gram stain aerobic bottle   Gram positive cocci in clusters-resembling Staph   1 out of 2 blood cultures   Gram stain anaerobic bottle   Gram negative rods   1 out of 2 blood cultures   Further results to follow   Further testing performed at [place] [address]    Abnormal    Narrative:     ORDER#: [id number]                          ORDERED BY: GINNA Clay      IMPRESSION:    Anaerobic GNR bacteremia  Large R colon cancer/ mass     Patient Active Problem List   Diagnosis    Lumbar stenosis with neurogenic claudication    Acquired scoliosis    Osteoporosis    Charcot Daleen Radha Tooth muscular atrophy    Excess weight    Osteoarthritis of lumbar spine with myelopathy    Osteoarthritis    Myelopathy (HCC)    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    Headache    Depression    Thoracic aortic aneurysm without rupture    Descending aortic aneurysm (HCC)    Vertigo    Shortness of breath    Essential hypertension    Acute on chronic combined systolic and diastolic CHF (congestive heart failure) ([facility])    Gait abnormality    A-fib (HCC)    Pleural effusion    Hypoxia    Acute pulmonary edema (HCC)    Acute on chronic combined systolic (congestive) and diastolic (congestive) heart failure (HCC)    Acute cystitis with hematuria Chronic diastolic CHF (congestive heart failure) (HCC)    Right lower quadrant abdominal pain    Hyponatremia    Hypokalemia    Anemia    CHF (congestive heart failure) (HCC)    Hypothyroid    Colonic mass    Central retinal vein occlusion, left eye, stable    Hx of drainage of abscess    Abdominal pain    Streptococcus viridans infection    Aortic thrombus (HCC)    Intra-abdominal abscess (HCC)    Adnexal mass    Change or removal of drains    Difficulty in walking    Muscle weakness (generalized)    Acute diastolic HF (heart failure) (HCC)    CHF (congestive heart failure), NYHA class I, acute on chronic, combined ([facility]    Gastro-esophageal reflux disease without esophagitis    Intracardiac thrombosis, not elsewhere classified    Impaired mobility and ADLs    Palliative care encounter    Advanced care planning/counseling discussion    Goals of care, counseling/discussion    Adenomatous polyp of descending colon    Adenomatous polyp of colon    Weakness    Encounter for hospice care discussion    Pneumonia of left lower lobe due to infectious organism    Gram-negative bacteremia    H/O abdominal abscess    Generalized weakness       PLAN:  Midline in am  IV Meropenem x 2 weeks  Average to discharge to skilled nursing facility  F/U CBC BMP  F/U repeat Blood Cx    Discussed with patient and RN    Elvira Abarca MD

## 2023-01-08 NOTE — PROGRESS NOTES
Physician Progress Note    1/8/2023   11:54 AM    Name:  Cait Wyman  MRN:    [id number]     IP Day: 3     Admit Date: 1/4/2023  6:18 PM  PCP: Lindsay Yang MD    Code Status:  Limited    Subjective:     Denies complaints    Current Facility-Administered Medications   Medication Dose Route Frequency Provider Last Rate Last Admin    traMADol (ULTRAM) tablet 50 mg  50 mg Oral Q6H PRN Misti Lime, DO   50 mg at 01/08/23 0900    rivaroxaban (XARELTO) tablet 15 mg  15 mg Oral Daily Misti Lime, DO   15 mg at 01/07/23 1659    meropenem (MERREM) 1,000 mg in sodium chloride 0.9 % 100 mL IVPB (mini-bag)  1,000 mg IntraVENous Q8H Avis Casas, DO   Stopped at 01/08/23 0550    citalopram (CELEXA) tablet 20 mg  20 mg Oral Daily Misti Lime, DO   20 mg at 01/08/23 0900    digoxin (LANOXIN) tablet 125 mcg  125 mcg Oral Daily Misti Lime, DO   125 mcg at 01/08/23 0900    lactobacillus acidophilus [facility]) 2 tablet  2 tablet Oral Daily Misti Lime, DO   2 tablet at 01/08/23 0858    meclizine (ANTIVERT) tablet 25 mg  25 mg Oral Q6H PRN Misti Lime, DO        therapeutic multivitamin-minerals 1 tablet  1 tablet Oral Daily Misti Lime, DO   1 tablet at 01/08/23 0858    pantoprazole (PROTONIX) tablet 40 mg  40 mg Oral QAM AC Mick R Mae, DO   40 mg at 01/08/23 0520    levothyroxine (SYNTHROID) tablet 88 mcg  88 mcg Oral QAM AC Mick R Mae, DO   88 mcg at 01/08/23 0520    vitamin B-12 (CYANOCOBALAMIN) tablet 1,000 mcg  1,000 mcg Oral Daily Misti Lime, DO   1,000 mcg at 01/08/23 0858    vitamin D (CHOLECALCIFEROL) tablet 2,000 Units  2,000 Units Oral Dinner Misti Lime, DO   2,000 Units at 01/07/23 1659    sacubitril-valsartan (ENTRESTO) 24-26 MG per tablet 1 tablet  1 tablet Oral BID Misti Lime, DO   1 tablet at 01/08/23 0858    carvedilol (COREG) tablet 6.25 mg  6.25 mg Oral BID Misti Lime, DO   6.25 mg at 01/08/23 0900       Physical Examination:      Vitals:  BP 111/76   Pulse 71   Temp 97.5 °F (36.4 °C) (Oral)   Resp 16   Ht 5' (1.524 m)   Wt 214 lb (97.1 kg)   SpO2 98%   BMI 41.79 kg/m²   Temp (24hrs), Av.6 °F (36.4 °C), Min:97.5 °F (36.4 °C), Max:97.8 °F (36.6 °C)      General appearance: Elderly, tired appearing, and chronically ill-appearing. Lungs: clear to auscultation bilaterally, normal effort  Heart: regular rate and rhythm, no murmur  Abdomen: Fullness and dullness to percussion right lower quadrant. Mild tenderness to palpation. No rebound tenderness or guarding. Extremities: no edema, redness, tenderness in the calves. Cap refill <2s    Data:     Labs:  Recent Labs     23   WBC 15.9* 11.3*   HGB 11.4* 10.6*    198     Recent Labs     23    139   K 3.3* 3.6    106   CO2 23 24   BUN 21 17   CREATININE 0.57 0.47*   GLUCOSE 97 106*     Recent Labs     2320 23  0512   AST 25 27   ALT 13 15   BILITOT 0.7 0.5   ALKPHOS 54 50     CT Abd/Pelvis:  Slight increase seen in size of the complex process in the right lower   quadrant with new presence of gas indicating communication with the bowel. The process appears to be centered around the cecum with involvement as well   seen in the ascending colon and several distal small bowel loops. Findings   concerning for abscess or necrotic mass with possible superimposed infection. Stable 9.1 cm cystic lesion in the left adnexa in which 6-12 month follow-up   can be performed as well as gynecologic consultation as clinically indicated. Assessment and Plan:        1. Sepsis secondary to GNR bacteremia related to complex mass in the right lower quadrant  -IV meropenem. Infectious disease following. Will need midline or PICC line. Follow final culture data  -Follow surgical pathology from colonoscopy.   Noted elevated CEA (19.7)  -Stop IVF  -Case management had insisted on acute rehab evaluation and transfer however patient deemed poor candidate. Now awaiting SNF placement. Class III obesity  Hypertension  Chronic combined heart failure    Diet: ADULT DIET;  Full Liquid  ADULT ORAL NUTRITION SUPPLEMENT; Breakfast, Dinner, Lunch; Diabetic Oral Supplement  Ppx: lovenox  Limited    >35 minutes in total care time    Electronically signed by Anne Marion DO on 1/8/2023 at 11:54 AM

## 2023-01-08 NOTE — FLOWSHEET NOTE
Registered Nurse Shift Summary  23   Patient Name: Joellen Espinosa  Attending Provider: Terrell Benavidez DO      Admit Date: 2023  MRN: [id number]                           : 1932  Limited      7:57 AM Assumed Care of Patient. Documentation initiated as per policy / SOP. Report Received from [facility].    1140: Patient's son at bedside. All questions were answered regarding patient's care. He is in agreement with Midline placement for continued antibiotic treatment. Electronically signed by Marquis Coronado RN on 2023 at 12:03 PM     1230: Consent on chart. Assessment Complete, please see flow sheets. Labs, orders, plan of care and meds reviewed.      Labs:   Recent Labs     23  05   WBC 15.9* 11.3*   HGB 11.4* 10.6*   HCT 34.3* 31.7*    198     Recent Labs     2320 23  0512    139   K 3.3* 3.6    106   CO2 23 24   BUN 21 17   CREATININE 0.57 0.47*   CALCIUM 9.3 9.3     Recent Labs     23  0520 23  0512   AST 25 27   ALT 13 15   BILIDIR 0.3 <0.2   BILITOT 0.7 0.5   ALKPHOS 54 50         Last set of vitals:  Enc Vitals  BP: (!) 97/57 (23)  Heart Rate: 71 (23)  Resp: 18 (23)  Temp: 97.8 °F (36.6 °C) (23)  Temp Source: Oral (23 0923)  SpO2: 96 % (23)  Weight: 211 lb 11.2 oz (96 kg) (23 103)  Height: 5' (152.4 cm) (23)     I/O    Intake/Output Summary (Last 24 hours) at 2023 0757  Last data filed at 2023 1853  Gross per 24 hour   Intake 3627.67 ml   Output --   Net 3627.67 ml

## 2023-01-09 ENCOUNTER — APPOINTMENT (OUTPATIENT)
Dept: INTERVENTIONAL RADIOLOGY/VASCULAR | Age: 88
DRG: 871 | End: 2023-01-09
Payer: MEDICARE

## 2023-01-09 LAB
ANION GAP SERPL CALCULATED.3IONS-SCNC: 8 MEQ/L (ref 9–15)
BASOPHILS ABSOLUTE: 0.1 K/UL (ref 0–0.2)
BASOPHILS RELATIVE PERCENT: 0.6 %
BUN BLDV-MCNC: 13 MG/DL (ref 8–23)
CALCIUM SERPL-MCNC: 9.3 MG/DL (ref 8.5–9.9)
CHLORIDE BLD-SCNC: 107 MEQ/L (ref 95–107)
CO2: 27 MEQ/L (ref 20–31)
CREAT SERPL-MCNC: 0.46 MG/DL (ref 0.5–0.9)
CULTURE, BLOOD ID SENSITIVITY: ABNORMAL
EOSINOPHILS ABSOLUTE: 0.3 K/UL (ref 0–0.7)
EOSINOPHILS RELATIVE PERCENT: 2.9 %
GFR SERPL CREATININE-BSD FRML MDRD: >60 ML/MIN/{1.73_M2}
GLUCOSE BLD-MCNC: 105 MG/DL (ref 70–99)
HCT VFR BLD CALC: 32.3 % (ref 37–47)
HEMOGLOBIN: 10.6 G/DL (ref 12–16)
LYMPHOCYTES ABSOLUTE: 1.1 K/UL (ref 1–4.8)
LYMPHOCYTES RELATIVE PERCENT: 9.7 %
MCH RBC QN AUTO: 27.5 PG (ref 27–31.3)
MCHC RBC AUTO-ENTMCNC: 32.7 % (ref 33–37)
MCV RBC AUTO: 84.1 FL (ref 79.4–94.8)
MONOCYTES ABSOLUTE: 1.1 K/UL (ref 0.2–0.8)
MONOCYTES RELATIVE PERCENT: 9.8 %
NEUTROPHILS ABSOLUTE: 9 K/UL (ref 1.4–6.5)
NEUTROPHILS RELATIVE PERCENT: 77 %
ORGANISM: ABNORMAL
PDW BLD-RTO: 16.4 % (ref 11.5–14.5)
PLATELET # BLD: 250 K/UL (ref 130–400)
POTASSIUM REFLEX MAGNESIUM: 4.1 MEQ/L (ref 3.4–4.9)
RBC # BLD: 3.84 M/UL (ref 4.2–5.4)
SODIUM BLD-SCNC: 142 MEQ/L (ref 135–144)
WBC # BLD: 11.7 K/UL (ref 4.8–10.8)

## 2023-01-09 PROCEDURE — C1751 CATH, INF, PER/CENT/MIDLINE: HCPCS

## 2023-01-09 PROCEDURE — 2580000003 HC RX 258: Performed by: INTERNAL MEDICINE

## 2023-01-09 PROCEDURE — 36410 VNPNXR 3YR/> PHY/QHP DX/THER: CPT

## 2023-01-09 PROCEDURE — 6370000000 HC RX 637 (ALT 250 FOR IP): Performed by: INTERNAL MEDICINE

## 2023-01-09 PROCEDURE — 99232 SBSQ HOSP IP/OBS MODERATE 35: CPT | Performed by: INTERNAL MEDICINE

## 2023-01-09 PROCEDURE — 99232 SBSQ HOSP IP/OBS MODERATE 35: CPT | Performed by: PHYSICAL MEDICINE & REHABILITATION

## 2023-01-09 PROCEDURE — 97535 SELF CARE MNGMENT TRAINING: CPT

## 2023-01-09 PROCEDURE — 85025 COMPLETE CBC W/AUTO DIFF WBC: CPT

## 2023-01-09 PROCEDURE — 6360000002 HC RX W HCPCS: Performed by: INTERNAL MEDICINE

## 2023-01-09 PROCEDURE — 80048 BASIC METABOLIC PNL TOTAL CA: CPT

## 2023-01-09 PROCEDURE — 05HY33Z INSERTION OF INFUSION DEVICE INTO UPPER VEIN, PERCUTANEOUS APPROACH: ICD-10-PCS | Performed by: INTERNAL MEDICINE

## 2023-01-09 PROCEDURE — 99232 SBSQ HOSP IP/OBS MODERATE 35: CPT

## 2023-01-09 PROCEDURE — 97116 GAIT TRAINING THERAPY: CPT

## 2023-01-09 PROCEDURE — 76937 US GUIDE VASCULAR ACCESS: CPT

## 2023-01-09 PROCEDURE — 36415 COLL VENOUS BLD VENIPUNCTURE: CPT

## 2023-01-09 PROCEDURE — 1210000000 HC MED SURG R&B

## 2023-01-09 RX ADMIN — TRAMADOL HYDROCHLORIDE 50 MG: 50 TABLET ORAL at 04:54

## 2023-01-09 RX ADMIN — MEROPENEM 1000 MG: 1 INJECTION, POWDER, FOR SOLUTION INTRAVENOUS at 20:46

## 2023-01-09 RX ADMIN — SACUBITRIL AND VALSARTAN 1 TABLET: 24; 26 TABLET, FILM COATED ORAL at 20:40

## 2023-01-09 RX ADMIN — PANTOPRAZOLE SODIUM 40 MG: 40 TABLET, DELAYED RELEASE ORAL at 04:55

## 2023-01-09 RX ADMIN — CYANOCOBALAMIN TAB 500 MCG 1000 MCG: 500 TAB at 09:33

## 2023-01-09 RX ADMIN — CITALOPRAM HYDROBROMIDE 20 MG: 20 TABLET ORAL at 09:33

## 2023-01-09 RX ADMIN — MEROPENEM 1000 MG: 1 INJECTION, POWDER, FOR SOLUTION INTRAVENOUS at 04:53

## 2023-01-09 RX ADMIN — LEVOTHYROXINE SODIUM 88 MCG: 0.09 TABLET ORAL at 04:55

## 2023-01-09 RX ADMIN — Medication 1 TABLET: at 09:33

## 2023-01-09 RX ADMIN — CARVEDILOL 6.25 MG: 6.25 TABLET, FILM COATED ORAL at 20:40

## 2023-01-09 RX ADMIN — Medication 2000 UNITS: at 17:42

## 2023-01-09 RX ADMIN — MEROPENEM 1000 MG: 1 INJECTION, POWDER, FOR SOLUTION INTRAVENOUS at 14:07

## 2023-01-09 RX ADMIN — Medication 2 TABLET: at 09:33

## 2023-01-09 RX ADMIN — RIVAROXABAN 15 MG: 15 TABLET, FILM COATED ORAL at 17:42

## 2023-01-09 ASSESSMENT — PAIN SCALES - GENERAL: PAINLEVEL_OUTOF10: 4

## 2023-01-09 ASSESSMENT — ENCOUNTER SYMPTOMS
BLOOD IN STOOL: 0
DIARRHEA: 1
SHORTNESS OF BREATH: 0
CHOKING: 0
COLOR CHANGE: 0
ABDOMINAL DISTENTION: 0
APNEA: 0
VOMITING: 0
WHEEZING: 0
VOICE CHANGE: 0
ABDOMINAL PAIN: 0
STRIDOR: 0
NAUSEA: 0
COUGH: 0
ANAL BLEEDING: 0
BACK PAIN: 0
TROUBLE SWALLOWING: 0
RECTAL PAIN: 0
CONSTIPATION: 0
CHEST TIGHTNESS: 0
SORE THROAT: 0
EYE DISCHARGE: 0

## 2023-01-09 ASSESSMENT — VISUAL ACUITY: OU: 1

## 2023-01-09 ASSESSMENT — PAIN DESCRIPTION - DESCRIPTORS: DESCRIPTORS: DISCOMFORT

## 2023-01-09 ASSESSMENT — PAIN DESCRIPTION - LOCATION: LOCATION: ABDOMEN

## 2023-01-09 NOTE — PROGRESS NOTES
Infectious Disease Progress Note       1/9/2023    Patient is a followup regarding anaerobic bacteremia/ abdominal abscess history with persistent and enlarging mass/ likely colon cancer, currently on Meropenem for gram negative bacteremia. Looking ok today, smiling and sitting in a chair. She does not speak English but can understand some - shakes her head appropriately to no pain, no nausea, yes to weakness. I called her son and we had a conversation about her clinical picture and progress overall. Lab Results   Component Value Date    WBC 11.7 (H) 01/09/2023    HGB 10.6 (L) 01/09/2023    HCT 32.3 (L) 01/09/2023    MCV 84.1 01/09/2023     01/09/2023     Lab Results   Component Value Date/Time     01/09/2023 05:05 AM    K 4.1 01/09/2023 05:05 AM     01/09/2023 05:05 AM    CO2 27 01/09/2023 05:05 AM    BUN 13 01/09/2023 05:05 AM    CREATININE 0.46 01/09/2023 05:05 AM    GLUCOSE 105 01/09/2023 05:05 AM    CALCIUM 9.3 01/09/2023 05:05 AM        WBC trends are being monitored. Antibiotic doses are being adjusted per most recent renal labs.      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    Headache    Depression    Thoracic aortic aneurysm without rupture    Descending aortic aneurysm (HCC)    Vertigo    Shortness of breath    Essential hypertension    Acute on chronic combined systolic and diastolic CHF (congestive heart failure) (HCC)    Gait abnormality    A-fib (HCC)    Pleural effusion    Hypoxia    Acute pulmonary edema (HCC)    Acute on chronic combined systolic (congestive) and diastolic (congestive) heart failure (HCC)    Acute cystitis with hematuria    Chronic diastolic CHF (congestive heart failure) (HCC)    Right lower quadrant abdominal pain    Hyponatremia    Hypokalemia    Anemia    CHF (congestive heart failure) (HCC)    Hypothyroid    Colonic mass    Central retinal vein occlusion, left eye, stable    Hx of drainage of abscess    Abdominal pain    Streptococcus viridans infection    Aortic thrombus (HCC)    Intra-abdominal abscess (HCC)    Adnexal mass    Change or removal of drains    Difficulty in walking    Muscle weakness (generalized)    Acute diastolic HF (heart failure) (HCC)    CHF (congestive heart failure), NYHA class I, acute on chronic, combined ([facility] [address]    Gastro-esophageal reflux disease without esophagitis    Intracardiac thrombosis, not elsewhere classified    Impaired mobility and ADLs    Palliative care encounter    Advanced care planning/counseling discussion    Goals of care, counseling/discussion    Adenomatous polyp of descending colon    Adenomatous polyp of colon    Weakness    Encounter for hospice care discussion    Pneumonia of left lower lobe due to infectious organism    Gram-negative bacteremia    H/O abdominal abscess    Generalized weakness       General: Patient appears in no acute distress, pleasant and cooperative  Skin: no new rashes   HEENT: EOMI  Neck is supple,  Heart: S1 S2  Lungs: clear bilaterally   Abdomen: protruberant, no tenderness  Extrem no pain  Neuro exam: CN II-XII intact        ASSESSMENT:    Anaerobic bacteremia/ abdominal abscess history with persistent and enlarging mass/ likely colon cancer, currently on Meropenem for gram negative bacteremia.      PLAN:  Meropenem x 2 weeks and stop    Imaging and labs were reviewed per medical records and any ID pertinent labs were also addressed        Avis Casas DO

## 2023-01-09 NOTE — DISCHARGE SUMMARY
[facility] Medicine Discharge Summary    Nayeli Rosales  :  1932  MRN:  [id number]    Admit date:  2023    Discharge date: 1/10/2023    Admitting Physician:  Orestes Akbar DO  Primary Care Physician:  Shannon Mckeon MD    Discharge Diagnoses:    Principal Problem:    Gram-negative bacteremia  Active Problems:    Colonic mass    Weakness    Encounter for hospice care discussion    Pneumonia of left lower lobe due to infectious organism    H/O abdominal abscess    Generalized weakness  Resolved Problems:    * No resolved hospital problems. *    Chief Complaint   Patient presents with    Headache     Headache and weakness since colonoscopy this morning. Condition: improved   Activity: no restrictions  Diet: regular  Disposition: SNF  Functional Status: ambulatory with assistance    Significant Findings:     CT Abd/Pelvis:  Slight increase seen in size of the complex process in the right lower   quadrant with new presence of gas indicating communication with the bowel. The process appears to be centered around the cecum with involvement as well   seen in the ascending colon and several distal small bowel loops. Findings   concerning for abscess or necrotic mass with possible superimposed infection. Stable 9.1 cm cystic lesion in the left adnexa in which 6-12 month follow-up   can be performed as well as gynecologic consultation as clinically indicated. Hospital Course:   80-year-old female with known colonic mass was admitted for sepsis secondary to bacteremia related to complex mass in right lower quadrant following recent colonoscopy. Family declined any aggressive surgical care and will continue with conservative management with IV antibiotics and nursing home placement. Palliative care will continue to follow after discharge.       Exam on Discharge:   BP (!) 92/55   Pulse 56   Temp 97.7 °F (36.5 °C) (Oral)   Resp 18   Ht 5' (1.524 m)   Wt 214 lb (97.1 kg)   SpO2 97%   BMI 41.79 kg/m²   General appearance: Elderly, tired appearing, and chronically ill-appearing. Lungs: clear to auscultation bilaterally, normal effort  Heart: regular rate and rhythm, no murmur  Abdomen: Fullness and dullness to percussion right lower quadrant. Mild tenderness to palpation. No rebound tenderness or guarding. Extremities: no edema, redness, tenderness in the calves. Cap refill <2s    Discharge Medication List:     Medication List        CONTINUE taking these medications      albuterol sulfate  (90 Base) MCG/ACT inhaler  Commonly known as: PROVENTIL;VENTOLIN;PROAIR  Inhale 2 puffs into the lungs every 6 hours as needed for Wheezing     carvedilol 6.25 MG tablet  Commonly known as: COREG  TAKE 1 TABLET TWICE A DAY     Centrum Silver Ultra Womens Tabs     citalopram 20 MG tablet  Commonly known as: CELEXA     digoxin 125 MCG tablet  Commonly known as: LANOXIN  Take 1 tablet by mouth daily     EYE DROPS OP     ferrous sulfate 325 (65 Fe) MG tablet  Commonly known as: IRON 325  Take 1 tablet by mouth 2 times daily (with meals)     furosemide 20 MG tablet  Commonly known as: LASIX  TAKE 1 TABLET DAILY     Lactobacillus Tabs     meclizine 25 MG tablet  Commonly known as: ANTIVERT     nitroGLYCERIN 0.4 MG SL tablet  Commonly known as: NITROSTAT  up to max of 3 total doses. If no relief after 1 dose, call 911.      Osteo Bi-Flex One Per Day Tabs     pantoprazole 40 MG tablet  Commonly known as: PROTONIX  TAKE 1 TABLET DAILY     potassium chloride 20 MEQ extended release tablet  Commonly known as: KLOR-CON M     rivaroxaban 15 MG Tabs tablet  Commonly known as: Xarelto  Take 1 tablet by mouth daily     sacubitril-valsartan 24-26 MG per tablet  Commonly known as: ENTRESTO  Take 1 tablet by mouth 2 times daily     sucralfate 1 GM tablet  Commonly known as: CARAFATE  Take 1 tablet by mouth 4 times daily     Synthroid 88 MCG tablet  Generic drug: levothyroxine     vitamin B-12 1000 MCG tablet  Commonly known as: CYANOCOBALAMIN     vitamin D 50 MCG (2000 UT) Tabs tablet  Commonly known as: CHOLECALCIFEROL  Take 1 tablet by mouth Daily with supper            STOP taking these medications      amLODIPine 5 MG tablet  Commonly known as: NORVASC            IV antibiotic will be prescribed per recommendation of infectious disease. Please refer to their separate documentation. As of 1/8, they recommend 2 weeks of IV meropenem.     DC time 37 minutes    Signed:  Roberto Holman DO  1/9/2023, 11:17 AM

## 2023-01-09 NOTE — PROGRESS NOTES
Physical Therapy Med Surg Daily Treatment Note  Fac[facility]Department[facility]  Room: G474/Y204-03       NAME: Laura Rodriguez  : 1932 (80 y.o.)  MRN: [id number]  CODE STATUS: Limited    Date of Service: 2023    Patient Diagnosis(es): Dehydration [E86.0]  Weakness [R53.1]  Generalized weakness [R53.1]  Acute nonintractable headache, unspecified headache type [R51.9]  Pneumonia of left lower lobe due to infectious organism [J18.9]  Colonic mass [K63.89]   Chief Complaint   Patient presents with    Headache     Headache and weakness since colonoscopy this morning.      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     Encounter for hospice care discussion 2023    Pneumonia of left lower lobe due to infectious organism 2023    Gram-negative bacteremia 2023    H/O abdominal abscess 2023    Generalized weakness 2023    Weakness 2023    Adenomatous polyp of descending colon 2023    Adenomatous polyp of colon 2023    Impaired mobility and ADLs 2022    Palliative care encounter 2022    Advanced care planning/counseling discussion 2022    Goals of care, counseling/discussion     Acute diastolic HF (heart failure) ([address] 2022    CHF (congestive heart failure), NYHA class I, acute on chronic, combined ([address] 2022    Difficulty in walking 2022    Muscle weakness (generalized) 2022    Gastro-esophageal reflux disease without esophagitis 2022    Intracardiac thrombosis, 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    Change or removal of drains 2022    Adnexal mass 2022    Intra-abdominal abscess ([address] 2022    Aortic thrombus ([address] 2022    Hx of drainage of abscess 2022    Abdominal pain 2022    Streptococcus viridans infection 2022    Colonic mass 2022    Right lower quadrant abdominal pain 07/18/2022    Hyponatremia 07/18/2022    Hypokalemia 07/18/2022    Anemia 07/18/2022    CHF (congestive heart failure) ([address] 07/18/2022    Hypothyroid 07/18/2022    Central retinal vein occlusion, left eye, stable 04/12/2021    Lumbar stenosis with neurogenic claudication 06/02/2016    Chronic diastolic CHF (congestive heart failure) ([address] 04/01/2022    Acute cystitis with hematuria 02/21/2021    Acute on chronic combined systolic (congestive) and diastolic (congestive) heart failure ([address] 02/09/2021    Pleural effusion 02/08/2021    Hypoxia     Acute pulmonary edema (HCC)     Gait abnormality 01/27/2021    Acute on chronic combined systolic and diastolic CHF (congestive heart failure) ([address] 01/25/2021    Essential hypertension 02/16/2018    Shortness of breath     Vertigo     Thoracic aortic aneurysm without rupture 06/23/2017    Descending aortic aneurysm ([address] 06/23/2017    Osteoarthritis     Myelopathy ([address]     Headache     Depression     Acquired scoliosis 06/02/2016    Osteoporosis 06/02/2016    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    Excess weight 06/02/2016    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/02/2016        Past Medical History:   Diagnosis Date    Ascending aortic aneurysm 6/23/2017    Charcot Arleen Tooth muscular atrophy     CHF (congestive heart failure) (HCC)     Chronic diastolic CHF (congestive heart failure) ([address] 4/1/2022    Depression     Descending aortic aneurysm ([address] 6/23/2017    Essential hypertension 2/16/2018    Headache     HTN (hypertension)     Impaired mobility and activities of daily living     Lumbar stenosis with neurogenic claudication     Myelopathy ([address]     Osteoarthritis      Past Surgical History:   Procedure Laterality Date    COLONOSCOPY N/A 1/4/2023    COLONOSCOPY DIAGNOSTIC performed by Jeannie William MD at [facility]    IR INS PICC VAD W SQ PORT GREATER THAN 5  9/23/2022    IR INS PICC VAD W SQ PORT GREATER THAN 5 9/23/2022 [facility] PROCEDURE    JOINT REPLACEMENT Bilateral     knees    OTHER SURGICAL HISTORY Right 07/21/2022    cat scan guided abdominal mass aspiration with drain placement by Dr. Dania Low Left 02/25/2021    LEFT PLEURAL CATHETER INSERTION performed by Betina Adkins MD at [place] [id number] Left 01/29/2021    total of 755 cc removed per Dr Rufino Callejas specimen sent to lab       Chart Reviewed: Yes  Family / Caregiver Present: Yes ([address] daughter)    Restrictions:  Restrictions/Precautions: Fall Risk    SUBJECTIVE:   Subjective: Patient resting in bed. Agreeable to tx. Worried she may fall if she stands up. Response To Previous Treatment: Patient with no complaints from previous session. Pain  Denies     OBJECTIVE:   Bed mobility  Bridging: Contact guard assistance  Supine to Sit: Minimal assistance;Contact guard assistance  Sit to Supine: Minimal assistance  Scooting: Contact guard assistance  Bed Mobility Comments: HOB elevated    Transfers  Sit to Stand: Minimal Assistance;Contact guard assistance  Stand to Sit: Minimal Assistance;Contact guard assistance    Ambulation  Surface: Level tile  Device: Rolling Walker  Other Apparatus:  (O2 4 liters)  Assistance: Contact guard assistance  Quality of Gait: Flexed posture, right drop foot, low steppage, mildly unsteady, no LOB  Distance: 20 feet x3- 1 sitting/ 1 standing RB     ASSESSMENT   Body Structures, Functions, Activity Limitations Requiring Skilled Therapeutic Intervention: Decreased functional mobility ; Decreased endurance;Decreased balance;Decreased safe awareness;Decreased cognition;Decreased posture  Assessment: Patient improving tolerance to functional mobility. Completes multiple trials of short distance gait training at [facility]. Denies SOB/dizziness. Mild/moderate fatigue post session.      Discharge Recommendations:  Continue to assess pending progress         Goals  Short Term Goals  Short Term Goal 1: SBA bed mobility  Short Term Goal 2: SBA sit to stand  Short Term Goal 3: SBA gait with ww 25 feet  Short Term Goal 4: pt able to stand with ww 2 min with SBA    PLAN    General Plan: 1 time a day 3-6 times a week  Safety Devices  Type of Devices: Call light within reach, All fall risk precautions in place, Bed alarm in place, Left in bed     AMPAC (6 CLICK) BASIC MOBILITY  AM-PAC Inpatient Mobility Raw Score : 17     Therapy Time   Individual   Time In 1025   Time Out 1049   Minutes 24     Timed Code Treatment Minutes: 24 Minutes  Gt 15  Tr 9     Clement garcia PTA, 01/09/23 at 12:43 PM         Definitions for assistance levels  Independent = pt does not require any physical supervision or assistance from another person for activity completion. Device may be needed.   Stand by assistance = pt requires verbal cues or instructions from another person, close to but not touching, to perform the activity  Minimal assistance= pt performs 75% or more of the activity; assistance is required to complete the activity  Moderate assistance= pt performs 50% of the activity; assistance is required to complete the activity  Maximal assistance = pt performs 25% of the activity; assistance is required to complete the activity  Dependent = pt requires total physical assistance to accomplish the task

## 2023-01-09 NOTE — PROGRESS NOTES
Palliative Care Progress Note  Patient: Heladio Arteaga  Gender: female  YOB: 1932  Unit/Bed: G648/M067-99  CodeStatus: Limited  Inpatient Treatment Team: Treatment Team: Attending Provider: Ghazala Vega DO; Consulting Physician: Cordell Jimenez DO; Consulting Physician: Nathalie Watson MD; Consulting Physician: Lina Pinzon MD; Registered Nurse: Shirleyann Lundborg, RN; : Harman Chapman, RN; : Severo Butler, RN; Registered Nurse: Carrie Bumpers, RN; Utilization Reviewer: Percy Portillo RN  Admit Date:  1/4/2023    Chief Complaint: Headache    History of Presenting Illness:      Heladio Arteaga is a 80 y.o. female on hospital day 4 with a history of chronic respiratory failure on home oxygen 2-3 L, shortness of breath, CHF, chronic diastolic CHF, atrial fibrillation, hypertension, abdominal abscess, CAD, ascending aortic aneurysm, Charcot-Arleen-Tooth muscular atrophy, depression, headache, lumbar stenosis, myelopathy, OA. Patient was brought to the emergency room with abdominal pain, weakness, diarrhea and headache following colonoscopy. Patient was admitted for large right lower quadrant fungaiting mass. Palliative care was consulted for goals of care, CODE STATUS discussion, family support, and symptom management. Upon entering the room I find the patient laying in bed with IV in Lt AC area running. Patient's previous functional status is alert and oriented x4 per patient's son Mira. Patient lives independently at home with son with DME consistent of walkers, cane and continuous oxygen. The patient reports having diarrhea for roughly 5 days. Patient is able to currently manage her bowels and bladder. Per conversation with nursing staff patient has a little redness on her sacrum region related to diarrhea and francisco-care. Patient speaks [place] but understands and is able to communicate some in [place].   Called the patient's son Mira via telephone call. Patient would like CODE STATUS to be changed to limited but they are considering surgical intervention at [facility]. However, the patient does not want to have a colostomy \"tube for waist\". Patient's son Avelino Landau is consulting with [facility] to inquire if they can do the surgery without end result of colostomy. Per our conversation if they cannot do the surgery without colostomy then the patient and family may choose alternative care, such as palliative care or hospice. No clear decision has been made at this time. Gave much education, clarity on condition, much time was spent and for active listening and answering questions. Most recent notable labs: Potassium 3.3, total protein 6.1, albumin 2.6, WBC 15.9, RBC 4.14, hemoglobin 11.4, hematocrit 34.3    1/9/2023: Followed up with patient and patient's son. Patient states \"no pain\" and her \"diarrhea has improved\" but she still has a little bit per our conversation. I called the patient's son Mira and they have declined surgical intervention R/T likelihood of survival was predicted 10% for this type of surgery per patient's son conversation with [facility] and patient declining ostomy bag for \"waist\". Patient has declined hospice care. Reeducated patient's son on benefits of palliative care program, again patient's son states he will think about it after 2 weeks of therapy at [facility]. Per our conversation \"his mother may not be able to return home if she is not able to walk to the bathroom or care for herself\". Patient and patient's son were thankful for this information. Most recent notable labs: Random glucose 105, WBC 11.7, RBC 3.84, hemoglobin 10.6, hematocrit 32.3, albumin 2.3, total protein 5.5      Review of Systems:       Review of Systems   Constitutional:  Positive for activity change and appetite change. Negative for chills, diaphoresis, fatigue, fever and unexpected weight change.    HENT:  Negative for drooling, hearing loss, mouth sores, sore throat, trouble swallowing and voice change. Eyes:  Negative for discharge and visual disturbance. Respiratory:  Negative for apnea, cough, choking, chest tightness, shortness of breath, wheezing and stridor. Cardiovascular:  Negative for chest pain, palpitations and leg swelling. Gastrointestinal:  Positive for diarrhea. Negative for abdominal distention, abdominal pain, anal bleeding, blood in stool, constipation, nausea, rectal pain and vomiting. Genitourinary:  Negative for difficulty urinating, dysuria, enuresis, frequency and hematuria. Musculoskeletal:  Negative for arthralgias, back pain, gait problem, joint swelling and myalgias. Skin:  Negative for color change, pallor, rash and wound. Allergic/Immunologic: Negative for food allergies and immunocompromised state. Neurological:  Positive for weakness. Negative for dizziness, tremors, seizures, syncope, facial asymmetry, speech difficulty, light-headedness, numbness and headaches. Hematological:  Negative for adenopathy. Does not bruise/bleed easily. Psychiatric/Behavioral:  Negative for agitation, behavioral problems, confusion, decreased concentration, dysphoric mood, hallucinations, self-injury, sleep disturbance and suicidal ideas. The patient is not nervous/anxious and is not hyperactive. All other systems reviewed and are negative. Physical Examination:       BP (!) 92/55   Pulse 56   Temp 97.7 °F (36.5 °C) (Oral)   Resp 18   Ht 5' (1.524 m)   Wt 214 lb (97.1 kg)   SpO2 97%   BMI 41.79 kg/m²    Physical Exam  Vitals and nursing note reviewed. Constitutional:       General: She is awake. She is not in acute distress. Appearance: Normal appearance. She is well-developed. She is obese. She is ill-appearing. Interventions: Nasal cannula in place. Comments: 3L NC   HENT:      Head: Normocephalic and atraumatic. No right periorbital erythema or left periorbital erythema. Jaw:  There is normal jaw occlusion. Right Ear: External ear normal. Decreased hearing noted. No laceration. Left Ear: External ear normal. Decreased hearing noted. No laceration. Nose: Nose normal. No nasal deformity, laceration, congestion or rhinorrhea. Mouth/Throat:      Lips: Pink. No lesions. Mouth: Mucous membranes are dry. No injury or angioedema. Eyes:      General: Lids are normal. Vision grossly intact. Gaze aligned appropriately. No scleral icterus. Right eye: No discharge. Left eye: No discharge. Extraocular Movements: Extraocular movements intact. Conjunctiva/sclera: Conjunctivae normal.      Pupils: Pupils are equal, round, and reactive to light. Cardiovascular:      Rate and Rhythm: Normal rate and regular rhythm. Pulses:           Dorsalis pedis pulses are 2+ on the right side and 2+ on the left side. Heart sounds: Normal heart sounds. Pulmonary:      Effort: Pulmonary effort is normal. No respiratory distress. Breath sounds: Decreased air movement present. Decreased breath sounds present. No wheezing or rhonchi. Comments: NC 3L oxygen  Abdominal:      General: Bowel sounds are normal. There is no distension. Palpations: Abdomen is rigid. Tenderness: There is no abdominal tenderness. There is no guarding or rebound. Comments: RLQ rigid   Musculoskeletal:      Cervical back: Normal range of motion. No edema, erythema or signs of trauma. No pain with movement. Normal range of motion. Right lower leg: No edema. Left lower leg: No edema. Skin:     General: Skin is warm and dry. Capillary Refill: Capillary refill takes less than 2 seconds. Coloration: Skin is pale. Findings: Bruising and erythema (redness coccyx R/T diarrhea) present. No burn, rash or wound. Nails: There is no clubbing. Neurological:      General: No focal deficit present.       Mental Status: She is alert, oriented to person, place, and time and easily aroused. Mental status is at baseline. Cranial Nerves: No facial asymmetry. Psychiatric:         Attention and Perception: Attention and perception normal.         Mood and Affect: Mood and affect normal.         Speech: Speech normal.         Behavior: Behavior normal. Behavior is cooperative. Thought Content: Thought content normal. Thought content does not include homicidal or suicidal ideation. Cognition and Memory: Cognition normal.         Judgment: Judgment normal.       Allergies:       Allergies   Allergen Reactions    Latex     Tape Larisa Sickle Tape]        Medications:      Current Facility-Administered Medications   Medication Dose Route Frequency Provider Last Rate Last Admin    traMADol (ULTRAM) tablet 50 mg  50 mg Oral Q6H PRN [facility] DO   50 mg at 01/09/23 0454    rivaroxaban (XARELTO) tablet 15 mg  15 mg Oral Daily [facility] DO   15 mg at 01/08/23 1825    meropenem (MERREM) 1,000 mg in sodium chloride 0.9 % 100 mL IVPB (mini-bag)  1,000 mg IntraVENous Q8H Avis Casas DO   Stopped at 01/09/23 0542    citalopram (CELEXA) tablet 20 mg  20 mg Oral Daily [facility], DO   20 mg at 01/09/23 0933    digoxin (LANOXIN) tablet 125 mcg  125 mcg Oral Daily [facility] DO   125 mcg at 01/08/23 0900    lactobacillus acidophilus [facility]) 2 tablet  2 tablet Oral Daily [facility] DO   2 tablet at 01/09/23 0933    meclizine (ANTIVERT) tablet 25 mg  25 mg Oral Q6H PRN [facility] DO        therapeutic multivitamin-minerals 1 tablet  1 tablet Oral Daily [facility] DO   1 tablet at 01/09/23 0933    pantoprazole (PROTONIX) tablet 40 mg  40 mg Oral QAM TIFFANY Mickaditya Wall, DO   40 mg at 01/09/23 0455    levothyroxine (SYNTHROID) tablet 88 mcg  88 mcg Oral QAM TIFFANY Wall, DO   88 mcg at 01/09/23 0455    vitamin B-12 (CYANOCOBALAMIN) tablet 1,000 mcg  1,000 mcg Oral Daily [facility], DO   1,000 mcg at 01/09/23 1901 vitamin D (CHOLECALCIFEROL) tablet 2,000 Units  2,000 Units Oral Dinner Ghazala Reach, DO   2,000 Units at 01/08/23 1825    sacubitril-valsartan (ENTRESTO) 24-26 MG per tablet 1 tablet  1 tablet Oral BID Ghazala Reach, DO   1 tablet at 01/08/23 0858    carvedilol (COREG) tablet 6.25 mg  6.25 mg Oral BID Ghazala Reach, DO   6.25 mg at 01/08/23 0900       History: PMHx:  Past Medical History:   Diagnosis Date    Ascending aortic aneurysm 6/23/2017    Charcot Arleen Tooth muscular atrophy     CHF (congestive heart failure) (HCC)     Chronic diastolic CHF (congestive heart failure) ([facility] [address] 4/1/2022    Depression     Descending aortic aneurysm ([facility] [address] 6/23/2017    Essential hypertension 2/16/2018    Headache     HTN (hypertension)     Impaired mobility and activities of daily living     Lumbar stenosis with neurogenic claudication     Myelopathy [facility])     Osteoarthritis        PSHx:  Past Surgical History:   Procedure Laterality Date    COLONOSCOPY N/A 1/4/2023    COLONOSCOPY DIAGNOSTIC performed by Una King MD at [facility]    IR INS PICC VAD W SQ PORT GREATER THAN 5  9/23/2022    IR INS PICC VAD W SQ PORT GREATER THAN 5 9/23/2022 [facility] PROCEDURE    JOINT REPLACEMENT Bilateral     knees    OTHER SURGICAL HISTORY Right 07/21/2022    cat scan guided abdominal mass aspiration with drain placement by Dr. Benita Carlson Left 02/25/2021    LEFT PLEURAL CATHETER INSERTION performed by Rosanna Gramajo MD at [place] [id number] Left 01/29/2021    total of 755 cc removed per Dr Kolton Holbrook specimen sent to lab       Social Hx:  Social History     Socioeconomic History    Marital status:       Spouse name: None    Number of children: 2    Years of education: None    Highest education level: None   Tobacco Use    Smoking status: Never    Smokeless tobacco: Never   Vaping Use    Vaping Use: Never used   Substance and Sexual Activity    Alcohol use: No     Alcohol/week: 0.0 standard drinks    Drug use: No    Sexual activity: Not Currently   Social History Narrative    , Lives With: Alone, son lives down the street, dtr is in the area    Type of Home: [address]: One level    Home Access: Stairs to enter with rails- Number of Steps: 2- Rails: Both    Bathroom Shower/Tub: Tub/Shower unit, Bathroom Equipment: Grab bars in shower, Shower chair    Home Equipment: Rolling walker, Cane(Pt infrequemtly uses DME for ambulation and prefers to furniture walk in home)    ADL Assistance: Independent, [address]: [place] [address] Responsibilities: Yes    Ambulation Assistance: Independent, Transfer Assistance: Independent    Additional Comments: Son stops over 2 times daily           Family Hx:  Family History   Problem Relation Age of Onset    Arthritis Mother     Arthritis Father     High Blood Pressure Father     Colon Cancer Brother        LABS: Reviewed     CBC:  Lab Results   Component Value Date/Time    WBC 11.7 01/09/2023 05:05 AM    RBC 3.84 01/09/2023 05:05 AM    HGB 10.6 01/09/2023 05:05 AM    HCT 32.3 01/09/2023 05:05 AM    MCV 84.1 01/09/2023 05:05 AM    MCH 27.5 01/09/2023 05:05 AM    MCHC 32.7 01/09/2023 05:05 AM    RDW 16.4 01/09/2023 05:05 AM     01/09/2023 05:05 AM     CBC with Differential:   Lab Results   Component Value Date/Time    WBC 11.7 01/09/2023 05:05 AM    RBC 3.84 01/09/2023 05:05 AM    HGB 10.6 01/09/2023 05:05 AM    HCT 32.3 01/09/2023 05:05 AM     01/09/2023 05:05 AM    MCV 84.1 01/09/2023 05:05 AM    MCH 27.5 01/09/2023 05:05 AM    MCHC 32.7 01/09/2023 05:05 AM    RDW 16.4 01/09/2023 05:05 AM    BANDSPCT 2 07/24/2022 05:00 AM    METASPCT 2 07/24/2022 05:00 AM    LYMPHOPCT 9.7 01/09/2023 05:05 AM    MONOPCT 9.8 01/09/2023 05:05 AM    BASOPCT 0.6 01/09/2023 05:05 AM    MONOSABS 1.1 01/09/2023 05:05 AM    LYMPHSABS 1.1 01/09/2023 05:05 AM    EOSABS 0.3 01/09/2023 05:05 AM    BASOSABS 0.1 01/09/2023 05:05 AM     CMP:    Lab Results   Component Value Date/Time     01/09/2023 05:05 AM    K 4.1 01/09/2023 05:05 AM     01/09/2023 05:05 AM    CO2 27 01/09/2023 05:05 AM    BUN 13 01/09/2023 05:05 AM    CREATININE 0.46 01/09/2023 05:05 AM    GFRAA >60.0 10/11/2022 10:30 AM    LABGLOM >60.0 01/09/2023 05:05 AM    GLUCOSE 105 01/09/2023 05:05 AM    PROT 5.5 01/07/2023 05:12 AM    LABALBU 2.3 01/07/2023 05:12 AM    CALCIUM 9.3 01/09/2023 05:05 AM    BILITOT 0.5 01/07/2023 05:12 AM    ALKPHOS 50 01/07/2023 05:12 AM    AST 27 01/07/2023 05:12 AM    ALT 15 01/07/2023 05:12 AM     BMP:    Lab Results   Component Value Date/Time     01/09/2023 05:05 AM    K 4.1 01/09/2023 05:05 AM     01/09/2023 05:05 AM    CO2 27 01/09/2023 05:05 AM    BUN 13 01/09/2023 05:05 AM    LABALBU 2.3 01/07/2023 05:12 AM    CREATININE 0.46 01/09/2023 05:05 AM    CALCIUM 9.3 01/09/2023 05:05 AM    GFRAA >60.0 10/11/2022 10:30 AM    LABGLOM >60.0 01/09/2023 05:05 AM    GLUCOSE 105 01/09/2023 05:05 AM     TSH:   Lab Results   Component Value Date/Time    TSH 2.690 03/17/2022 01:56 PM     Urinalysis:   Lab Results   Component Value Date/Time    NITRU Negative 01/04/2023 10:14 PM    WBCUA 0-2 01/04/2023 10:14 PM    BACTERIA Negative 01/04/2023 10:14 PM    RBCUA 3-5 01/04/2023 10:14 PM    BLOODU TRACE 01/04/2023 10:14 PM    SPECGRAV 1.022 01/04/2023 10:14 PM    GLUCOSEU Negative 01/04/2023 10:14 PM     Albumin:   Lab Results   Component Value Date    LABALBU 2.3 (L) 01/07/2023     Liver:   Lab Results   Component Value Date    ALT 15 01/07/2023    AST 27 01/07/2023    ALKPHOS 50 01/07/2023    BILITOT 0.5 01/07/2023     Weight Trends  Wt Readings from Last 10 Encounters:   01/08/23 214 lb (97.1 kg)   01/04/23 150 lb (68 kg)   12/29/22 150 lb (68 kg)   11/23/22 150 lb 6 oz (68.2 kg)   11/16/22 150 lb 4.8 oz (68.2 kg)   11/02/22 160 lb (72.6 kg)   10/24/22 155 lb (70.3 kg)   10/03/22 155 lb (70.3 kg)   09/19/22 155 lb (70.3 kg) 09/14/22 155 lb (70.3 kg)          FUNCTIONAL ADL´S:     Independent: [ x ] Eating, [   ] Dressing, [   ] Transferring, [   ] Toileting, [   ] Jena Spice, [ x ] Continence  Dependent   : [  ] Eating, [   ] Dressing, [   ] Transferring, [   ] Karyle Soho, [   ] Jena Spice, [   ] Continence  W. assistant : [  ] Eating, [ x ] Dressing, [ x ] Transferring, [ x ] Toileting, [ x ] Bathing, [   ] Continence    Radiology: Reviewed      CT HEAD WO CONTRAST    Result Date: 1/4/2023  EXAMINATION: CT OF THE HEAD WITHOUT CONTRAST  1/4/2023 7:27 pm TECHNIQUE: CT of the head was performed without the administration of intravenous contrast. Automated exposure control, iterative reconstruction, and/or weight based adjustment of the mA/kV was utilized to reduce the radiation dose to as low as reasonably achievable. COMPARISON: None. 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 ->No Decision Support Exception - unselect if not a suspected or confirmed emergency medical condition->Emergency Medical Condition (MA) What reading provider will be dictating this exam?->CRC FINDINGS: BRAIN/VENTRICLES: There is no acute intracranial hemorrhage, mass effect or midline shift. No abnormal extra-axial fluid collection. The gray-white differentiation is maintained without evidence of an acute infarct. There is prominence of the ventricles and sulci due to global parenchymal volume loss. There are nonspecific areas of hypoattenuation within the periventricular and subcortical white matter, which likely represent chronic microvascular ischemic change. ORBITS: The visualized portion of the orbits demonstrate no acute abnormality. SINUSES: The visualized paranasal sinuses and mastoid air cells demonstrate no acute abnormality.   There is mild mucosal thickening in the maxillary sinuses. SOFT TISSUES/SKULL: No acute abnormality of the visualized skull or soft tissues. No acute intracranial abnormality. CT ABDOMEN PELVIS W IV CONTRAST Additional Contrast? None    Result Date: 1/5/2023  EXAMINATION: CT OF THE ABDOMEN AND PELVIS WITH CONTRAST 1/5/2023 1:19 am TECHNIQUE: CT of the abdomen and pelvis was performed with the administration of intravenous contrast. Multiplanar reformatted images are provided for review. Automated exposure control, iterative reconstruction, and/or weight based adjustment of the mA/kV was utilized to reduce the radiation dose to as low as reasonably achievable. COMPARISON: 12/06/2022 HISTORY: ORDERING SYSTEM PROVIDED HISTORY: abd pain and fever post colonoscopy today TECHNOLOGIST PROVIDED HISTORY: Reason for exam:->abd pain and fever post colonoscopy today Additional Contrast?->None What reading provider will be dictating this exam?->CRC FINDINGS: Lower Chest: Atelectatic changes seen lung bases bilaterally. Enlargement identified of the ascending thoracic aorta as well as the descending thoracic aorta. Ascending thoracic aorta measures 5.6 x 5.7 cm with descending thoracic aorta measuring 4.3 x 4.3 cm. Organs: Liver is homogeneous in appearance. Low-attenuation focus seen inferiorly within the right lobe too small to characterize. The gallbladder is contracted. No evidence of stones or wall thickening. The pancreas is homogeneous. The spleen is heterogeneous and unremarkable. Both adrenal glands are without evidence of abnormality. Symmetric enhancement of the renal parenchyma. No stones or distension seen in the renal collecting system. GI/Bowel: Stomach is unremarkable in appearance. No wall thickening. The small bowel is within normal limits. No mucosal abnormality. There is a large complex process seen in the right lower quadrant measuring 15.0 x 9.6 cm most compatible with an abscess.   This process is increased in size when compared to the prior study. New presence of gas indicating communication with the bowel. Findings may reflect communication with the right sided bowel and cecum. Large carotic mass with super infection is possible. The loops of distal ileum within the right lower quadrant appear to be involved. No evidence of small-bowel obstruction. Pelvis: The bladder is mildly distended. No gross mass or lesion. Simple cystic structure identified within the left adnexa measuring up to 9.1 cm. Recommend follow-up ultrasound in 6-12 months. Gynecologic consultation can be performed. Peritoneum/Retroperitoneum: There is no abdominal retroperitoneal lymphadenopathy. No pelvic adenopathy. Vascular calcifications seen within the abdominal aorta and iliac vessels. Bones/Soft Tissues: Multilevel degenerative changes seen within the spine. Small right inguinal hernia containing fat only. Slight increase seen in size of the complex process in the right lower quadrant with new presence of gas indicating communication with the bowel. The process appears to be centered around the cecum with involvement as well seen in the ascending colon and several distal small bowel loops. Findings concerning for abscess or necrotic mass with possible superimposed infection. Stable 9.1 cm cystic lesion in the left adnexa in which 6-12 month follow-up can be performed as well as gynecologic consultation as clinically indicated. XR CHEST PORTABLE    Result Date: 1/4/2023  EXAMINATION: ONE XRAY VIEW OF THE CHEST 1/4/2023 7:16 pm COMPARISON: None. HISTORY: ORDERING SYSTEM PROVIDED HISTORY: fever TECHNOLOGIST PROVIDED HISTORY: Reason for exam:->fever What reading provider will be dictating this exam?->CRC FINDINGS: There is cardiomegaly. There is vascular congestion. There is tortuous dilated aortic arch and descending thoracic aorta. There is patchy infiltrates and pleural effusion in the left lung base.      Cardiomegaly with the dilated aortic arch and descending thoracic aorta. Consider CT for better assessment. Patchy left basilar infiltrate and effusion likely pneumonia. Colonoscopy    Result Date: 1/4/2023  Site: [address] [place] [address] Patient Name: Sagar Coburn Procedure Date: 1/4/2023 MRN: [id number] YOB: 1932 Gender: Female Attending MD: Stefano Olivares Indications:        -  Abnormal CT of the GI tract        - high CEA. Medications:        -  See the Anesthesia note for documentation of the administered medications Complications:        -  No immediate complications. Estimated Blood Loss:        -  Estimated blood loss was minimal. Procedure:        - The Colonoscope was introduced through the anus and advanced to the cecum,           identified by appendiceal orifice and ileocecal valve. -  The colonoscopy was performed without difficulty. -  The patient tolerated the procedure well. -  The quality of the bowel preparation was good. -  The ileocecal valve, appendiceal orifice, and rectum were photographed. Findings:        -  A 12 mm polyp was found in the rectum. The polyp was pedunculated. The           polyp was removed with a hot snare. Resection and retrieval were complete. -  A 10 mm polyp was found in the descending colon. The polyp was sessile. The polyp was removed with a cold snare. Resection and retrieval were           complete. -  A 7 mm polyp was found in the hepatic flexure. The polyp was sessile. The           polyp was removed with a cold snare. Resection and retrieval were complete. -  A frond-like/villous, fungating, infiltrative, friable and ulcerated           partially obstructing large mass was found in the ascending colon. The mass           was partially circumferential (involving two-thirds of the lumen           circumference). The mass measured 6 cm (in length).  This mass was extending           into the hepatic flexure. No spontaneous bleeding was present. Biopsies were           taken with a cold forceps for histology. -  A frond-like/villous, ulcerated, infiltrative ,friable  and polypoid           non-obstructing large mass was found in the cecum. The mass was partially           circumferential (involving one-half of the lumen circumference). No bleeding           was present. Biopsies were taken with a cold forceps for histology. Impression:        -  One 12 mm polyp in the rectum, removed with a hot snare. Resected and           retrieved. -  One 10 mm polyp in the descending colon, removed with a cold snare. Resected and retrieved. -  One 7 mm polyp at the hepatic flexure, removed with a cold snare. Resected           and retrieved. 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 Biopsied.        -  Likely malignant tumor in the cecum. Biopsied. Recommendation:        -  Return to my office as previously scheduled. - surgical consult after discussing with patient and family. Procedure Code(s):        - [id number], Colonoscopy, flexible; with removal of tumor(s), polyp(s), or other           lesion(s) by snare technique        - 61276-55, Colonoscopy, flexible; with biopsy, single or multiple Diagnosis Code(s):        - R93.3, Abnormal findings on diagnostic imaging of other parts of digestive           tract        - D12.8, Benign neoplasm of rectum        - D12.4, Benign neoplasm of descending colon        - D12.3, Benign neoplasm of transverse colon (hepatic flexure or splenic           flexure)        - D49.0, Neoplasm of unspecified behavior of digestive system        - K56.690, Other partial intestinal obstruction       CPT(R) - 2022 copyright [employer]. All Rights Reserved. The CPT codes, CCI edits and ICD codes generated are intended as suggestions       and were generated based on input data.   These codes are preliminary and upon        review may be revised to meet current compliance and payer requirements. The provider is responsible for the final determination of appropriate codes,       and modifiers. Scope Withdrawal Time:       00:15:21 Signature Name: Lester Casillas MD Signature Statement: This document has been electronically signed. Note Initiated On:1/4/2023 Signature Date:1/4/2023 12:30 PM       Assessment and plan:  Palliative care encounter: Spoke with patient and patient's son Mira and updated them of current condition and answered any questions. Patient and patient's son are considering surgical intervention at [facility] if they can perform the surgery without end result of colostomy. No clear choice has been made at this time. Change CODE STATUS to limited code as patient does not want intubation \"tubes\". Palliative care will continue to follow and answer questions as they arise. 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 However, this is not currently consistent with patient's goals of care. But if the patient or patient's family decline intervention or treatment for a sending colon mass they may choose to go the hospice route. I have discussed/reviewed the patient's case with nursing staff.    -Advance Care Planning  Discussed goals of care with patient. Explained in extensive detail nuances between full code, DNR CCA and DNR CC. Patient has made the decision to be LIMITED CODE WITH NO INTUBATION. Patient has previously designated son Mira as power of .       -Goals of Care Discussion:  Disease process and goals of treatment were discussed in basic terms. Xiomarai's goal is to optimize available comfort care measures and continue with current plan of care while considering aggressive surgical interventions. We discussed the palliative care philosophy in light of those goals. We discussed all care options contingent on treatment response and QOL. Much active listening, presence, and emotional support were given. 1/9/2023:  -Patient and patient's son Bernadine Zamudio felt her symptoms are well managed by current provider. The patient and patient's son Bernadine Zamudio was appreciative of the program information and possible benefits. I have met with the patient and patient's son in the past and left palliative care information/card. However, they will call if symptom burden increases or they wish to follow with the program.  -Continue with current plan of care, no changes  -Palliative care will sign off at this time. Please reach out or reconsult if desired. I have discussed/reviewed patient's case with nursing staff.    -Patient is currently being treated for multiple medical conditions by other providers  Large right lower quadrant fungating mass  Pneumonia of left lower lobe due to infectious organism  Generalized weakness  Dehydration  Acute non-intractable headache, unspecified headache type  Ascending colon mass  Hypertension  Atrial fibrillation  GERD  Hypothyroid disease  Diarrhea    MDM: Moderate    Electronically signed by ADRIENNE Diehl CNP on 1/9/2023 at 10:10 AM    Collaborating physician: Dr. Estela Blanchard     Please note this report is partially produced by using speech recognition hardware. It may contain errors related to the system, including grammar, punctuation and spelling as well as words and phrases that may seem inaccurate. For any questions or concerns feel free to contact me for clarification.     Thanks for the opportunity you have allowed us to provide palliative care to [address]. We will be in touch as care progresses. Please feel free to reach out to us should you have any questions or requests.

## 2023-01-09 NOTE — CARE COORDINATION
JANETW spoke with Trista Agarwal at [facility] and they can accept the pt today.   Ambulance was arranged for today at 8pm.

## 2023-01-09 NOTE — PROGRESS NOTES
Subjective: The patient complains of severe acute on chronic progressive fatigue and abd pain partially relieved by rest, PT, OT and meds   and exacerbated by exertion and recent illness. Initially presented to ED with abdominal pain, weakness, and diarrhea following colonscopy. Patient has a previously noted RLQ mass diagnosed at [facility] in December. Patient was treated with antibiotics and were discontinued. During previous admission, IR was not available for biopsy and patient was scheduled for outpatient colonoscopy for biopsy. Colonoscopy performed on 1/4/23 with biopsy. CT abdomen/pelivs showed slight increase in size of complex process in RLQ with new presence of gas indicating communication with the bowel. Process appears to be centered around the cecum with involvement in the ascending colon and several distal small bowel loops. Findings concerning for abscess or necrotic mass with possible superimposed infection. I am concerned about patients medical complexities including:  Principal Problem:    Gram-negative bacteremia  Active Problems:    Colonic mass    Weakness    Encounter for hospice care discussion    Pneumonia of left lower lobe due to infectious organism    H/O abdominal abscess    Generalized weakness  Resolved Problems:    * No resolved hospital problems. *      .    Reviewed recent nursing note and discussed current status and planned care with acute care providers, \"7:57 AM Assumed Care of Patient. Documentation initiated as per policy / SOP. Report Received from [place], [facility] [place].     1140: Patient's son at bedside. All questions were answered regarding patient's care. He is in agreement with Midline placement for continued antibiotic treatment. Electronically signed by Chey Iyer RN on 1/8/2023 at 12:03 PM     \". ROS x10: The patient also complains of severely impaired mobility and activities of daily living.   Otherwise no new problems with vision, hearing, nose, mouth, throat, dermal, cardiovascular, GI, , pulmonary, musculoskeletal, psychiatric or neurological.        Vital signs:  /83   Pulse 65   Temp 97.7 °F (36.5 °C) (Oral)   Resp 18   Ht 5' (1.524 m)   Wt 214 lb (97.1 kg)   SpO2 97%   BMI 41.79 kg/m²   I/O:   PO/Intake:    fair PO intake, monitoring for dysphagia    Bowel/Bladder:   continent,    General:  Patient is well developed, adequately nourished, and    well kempt. HEENT:    PERRLA, hearing intact to loud voice, external inspection of ear and nose benign. Inspection of lips, tongue and gums benign  Musculoskeletal: No significant change in strength or tone. All joints stable. Inspection and palpation of digits and nails show no clubbing, cyanosis or inflammatory conditions. Neuro/Psychiatric: Affect: flat-  Alert and oriented to self and situation with no needed cues. No significant change in deep tendon reflexes or sensation  Lungs:  Diminished, CTA-B  . Respiration effort is normal at rest.   Heart:   S1 = S2,   RRR. Abdomen:  Soft,  mildly-tender    Extremities:  Trace  lower extremity edema but no unusual tenderness. Skin:   BUE bruises dt blood draws      Rehabilitation:  Physical Therapy:   Bed mobility:  Bed mobility  Bridging: Contact guard assistance (01/08/23 0859)  Rolling to Left: Minimal assistance (01/06/23 1008)  Supine to Sit: Minimal assistance;Contact guard assistance (01/08/23 0859)  Sit to Supine: Moderate assistance (01/06/23 1008)  Scooting: Contact guard assistance (01/08/23 0859)  Bed Mobility Comments: HOB elevated (01/08/23 0859)  Transfers:  Transfers  Sit to Stand: Minimal Assistance (01/08/23 0859)  Stand to Sit: Minimal Assistance (01/08/23 0859)  Bed to Chair: Moderate assistance;2 Person Assistance (to [facility] with ww) (01/06/23 1011)  Gait:   Ambulation  Surface: Level tile (01/08/23 0859)  Device: Rolling Walker (01/08/23 0859)  Other Apparatus: O2 (3L) (01/08/23 0859)  Assistance:  Moderate assistance;2 Person assistance (01/06/23 1014)  Quality of Gait: Flexed posture, decreased step length/foot clearance, no LOB (01/08/23 0859)  Distance: 4ft to chair (01/08/23 0859)  Stairs:     W/C mobility:       Occupational Therapy:   Hand Dominance: Right  ADL  Feeding: Unable to assess(comment) (01/06/23 1009)  Grooming: Stand by assistance; Increased time to complete (01/06/23 1009)  UE Bathing: Contact guard assistance; Increased time to complete (01/06/23 1009)  LE Bathing: Moderate assistance; Increased time to complete (01/06/23 1009)  UE Dressing: Minimal assistance; Increased time to complete (01/06/23 1009)  LE Dressing: Moderate assistance; Increased time to complete (01/06/23 1009)  Toileting: Maximum assistance; Increased time to complete (01/06/23 1009)  Toilet Transfers  Toilet - Technique: Stand step (01/06/23 1012)  Equipment Used: Standard bedside commode (01/06/23 1012)  Toilet Transfer:  Moderate assistance (01/06/23 1012)          Speech Therapy:            Diet/Swallow:                   COGNITION  OT: Cognition Comment: follows one step commands with visual cues and demonstration  SP:           Lab/X-ray studies reviewed, analyzed and discussed with patient and staff:   Recent Results (from the past 24 hour(s))   Basic Metabolic Panel w/ Reflex to MG    Collection Time: 01/09/23  5:05 AM   Result Value Ref Range    Sodium 142 135 - 144 mEq/L    Potassium reflex Magnesium 4.1 3.4 - 4.9 mEq/L    Chloride 107 95 - 107 mEq/L    CO2 27 20 - 31 mEq/L    Anion Gap 8 (L) 9 - 15 mEq/L    Glucose 105 (H) 70 - 99 mg/dL    BUN 13 8 - 23 mg/dL    Creatinine 0.46 (L) 0.50 - 0.90 mg/dL    Est, Glom Filt Rate >60.0 >60    Calcium 9.3 8.5 - 9.9 mg/dL   CBC with Auto Differential    Collection Time: 01/09/23  5:05 AM   Result Value Ref Range    WBC 11.7 (H) 4.8 - 10.8 K/uL    RBC 3.84 (L) 4.20 - 5.40 M/uL    Hemoglobin 10.6 (L) 12.0 - 16.0 g/dL    Hematocrit 32.3 (L) 37.0 - 47.0 %    MCV 84.1 79.4 - 94.8 fL    MCH 27.5 27.0 - 31.3 pg MCHC 32.7 (L) 33.0 - 37.0 %    RDW 16.4 (H) 11.5 - 14.5 %    Platelets 866 822 - 590 K/uL    Neutrophils % 77.0 %    Lymphocytes % 9.7 %    Monocytes % 9.8 %    Eosinophils % 2.9 %    Basophils % 0.6 %    Neutrophils Absolute 9.0 (H) 1.4 - 6.5 K/uL    Lymphocytes Absolute 1.1 1.0 - 4.8 K/uL    Monocytes Absolute 1.1 (H) 0.2 - 0.8 K/uL    Eosinophils Absolute 0.3 0.0 - 0.7 K/uL    Basophils Absolute 0.1 0.0 - 0.2 K/uL     Previous extensive, complex labs, notes and diagnostics reviewed and analyzed. ALLERGIES:    Allergies as of 01/04/2023 - Fully Reviewed 01/04/2023   Allergen Reaction Noted    Latex  07/19/2017    Tape [adhesive tape]  06/28/2016      (please also verify by checking [facility])     Complex Physical Medicine & Rehab Issues Assess & Plan:   Severe abnormality of gait and mobility and impaired self-care and ADL's secondary to debility after sepsis. Updated functional and medical status reassessed regarding patients ability to participate in therapies and patient found to be able to participate in:      TBD--I am concerned about her ability to participate in 3 hours a day. Will continue to follow to attempt to get patient to the most efficient but most effective level of care will be in their best interest.  Continue to focus on energy conservation heart rate and blood pressure monitoring before during and after therapy endurance and consistency of function. Bowel constipation   and Bladder dysfunction   overactive, neurogenic bladder:  frequent toileting, ambulate to bathroom with assistance, check post void residuals. Check for C.difficile x1 if >2 loose stools in 24 hours, continue bowel & bladder program.  Monitor for UTI symptoms including lethargy and confusion    Moderate abd pain and generalized OA pain: reassess pain every shift and prior to and after each therapy session, give prn Tylenol   and consider scheduled Tylenol, modalities prn in therapy, consider Lidoderm, K-pad prn. Skin healing    breakdown   risk:  continue pressure relief program.  Daily skin exams and reports from nursing. Severe fatigue due to immobility and nutritional deficits: improvements in PO intake   Add vitamin B12 vitamin D and CoQ10 titrate dosing and add protein supplementation with low carb content. Complex discharge planning:   Discussed with care team-last 24 hour events noted. I will continue to follow along and reassess functional and medical status as we strive to improve patient's functional and medical outcomes progressing to the most efficient and lowest level of care. Complex Active General Medical Issues that complicate care:     1. Principal Problem:    Gram-negative bacteremia  Active Problems:    Colonic mass    Weakness    Encounter for hospice care discussion    Pneumonia of left lower lobe due to infectious organism    H/O abdominal abscess    Generalized weakness  Resolved Problems:    * No resolved hospital problems. *          Events and functional changes in the past 24 hours reviewed improvements in functional status are encouraging       Focus of today's plan-  Focus on endurance, activity pacing, reassessing rehab goals and discharge planning.         Jelly Johnston D.O., PM&R     Attending    [address]

## 2023-01-09 NOTE — PLAN OF CARE
Problem: Confusion  Goal: Confusion, delirium, dementia, or psychosis is improved or at baseline  Description: INTERVENTIONS:  1. Assess for possible contributors to thought disturbance, including medications, impaired vision or hearing, underlying metabolic abnormalities, dehydration, psychiatric diagnoses, and notify attending LIP  2. [place] high risk fall precautions, as indicated  3. Provide frequent short contacts to provide reality reorientation, refocusing and direction  4. Decrease environmental stimuli, including noise as appropriate  5. Monitor and intervene to maintain adequate nutrition, hydration, elimination, sleep and activity  6. If unable to ensure safety without constant attention obtain sitter and review sitter guidelines with assigned personnel  7. Initiate Psychosocial CNS and Spiritual Care consult, as indicated  Outcome: Progressing     Problem: Skin/Tissue Integrity  Goal: Absence of new skin breakdown  Description: 1. Monitor for areas of redness and/or skin breakdown  2. Assess vascular access sites hourly  3. Every 4-6 hours minimum:  Change oxygen saturation probe site  4. Every 4-6 hours:  If on nasal continuous positive airway pressure, respiratory therapy assess nares and determine need for appliance change or resting period.   Outcome: Progressing     Problem: ABCDS Injury Assessment  Goal: Absence of physical injury  Outcome: Progressing     Problem: Safety - Adult  Goal: Free from fall injury  Outcome: Progressing     Problem: Chronic Conditions and Co-morbidities  Goal: Patient's chronic conditions and co-morbidity symptoms are monitored and maintained or improved  Outcome: Progressing  Flowsheets (Taken 1/8/2023 0900 by Mariam Marie RN)  Care Plan - Patient's Chronic Conditions and Co-Morbidity Symptoms are Monitored and Maintained or Improved: Monitor and assess patient's chronic conditions and comorbid symptoms for stability, deterioration, or improvement

## 2023-01-10 ENCOUNTER — OFFICE VISIT (OUTPATIENT)
Dept: GERIATRIC MEDICINE | Age: 88
End: 2023-01-10
Payer: MEDICARE

## 2023-01-10 ENCOUNTER — TELEPHONE (OUTPATIENT)
Dept: GASTROENTEROLOGY | Age: 88
End: 2023-01-10

## 2023-01-10 VITALS
OXYGEN SATURATION: 93 % | BODY MASS INDEX: 42.01 KG/M2 | TEMPERATURE: 98.2 F | RESPIRATION RATE: 18 BRPM | WEIGHT: 214 LBS | HEART RATE: 58 BPM | HEIGHT: 60 IN | DIASTOLIC BLOOD PRESSURE: 80 MMHG | SYSTOLIC BLOOD PRESSURE: 129 MMHG

## 2023-01-10 DIAGNOSIS — I50.43 ACUTE ON CHRONIC COMBINED SYSTOLIC (CONGESTIVE) AND DIASTOLIC (CONGESTIVE) HEART FAILURE (HCC): Primary | ICD-10-CM

## 2023-01-10 DIAGNOSIS — R10.31 RIGHT LOWER QUADRANT ABDOMINAL PAIN: ICD-10-CM

## 2023-01-10 DIAGNOSIS — Z78.9 IMPAIRED MOBILITY AND ADLS: ICD-10-CM

## 2023-01-10 DIAGNOSIS — R53.1 GENERALIZED WEAKNESS: ICD-10-CM

## 2023-01-10 DIAGNOSIS — R10.31 RIGHT LOWER QUADRANT ABDOMINAL PAIN: Primary | ICD-10-CM

## 2023-01-10 DIAGNOSIS — K63.89 COLONIC MASS: ICD-10-CM

## 2023-01-10 DIAGNOSIS — Z74.09 IMPAIRED MOBILITY AND ADLS: ICD-10-CM

## 2023-01-10 LAB
BLOOD CULTURE, ROUTINE: NORMAL
SARS-COV-2, NAAT: NOT DETECTED

## 2023-01-10 PROCEDURE — 2700000000 HC OXYGEN THERAPY PER DAY

## 2023-01-10 PROCEDURE — G8484 FLU IMMUNIZE NO ADMIN: HCPCS | Performed by: NURSE PRACTITIONER

## 2023-01-10 PROCEDURE — G9692 HOSP RECD BY PT DUR MSMT PER: HCPCS | Performed by: NURSE PRACTITIONER

## 2023-01-10 PROCEDURE — 6360000002 HC RX W HCPCS: Performed by: INTERNAL MEDICINE

## 2023-01-10 PROCEDURE — 99231 SBSQ HOSP IP/OBS SF/LOW 25: CPT | Performed by: PHYSICAL MEDICINE & REHABILITATION

## 2023-01-10 PROCEDURE — 99308 SBSQ NF CARE LOW MDM 20: CPT | Performed by: NURSE PRACTITIONER

## 2023-01-10 PROCEDURE — 87635 SARS-COV-2 COVID-19 AMP PRB: CPT

## 2023-01-10 PROCEDURE — 6370000000 HC RX 637 (ALT 250 FOR IP): Performed by: INTERNAL MEDICINE

## 2023-01-10 PROCEDURE — 2580000003 HC RX 258: Performed by: INTERNAL MEDICINE

## 2023-01-10 RX ORDER — TRAMADOL HYDROCHLORIDE 50 MG/1
50 TABLET ORAL EVERY 6 HOURS PRN
Qty: 28 TABLET | Refills: 0 | Status: SHIPPED | OUTPATIENT
Start: 2023-01-10 | End: 2023-01-24

## 2023-01-10 RX ADMIN — CARVEDILOL 6.25 MG: 6.25 TABLET, FILM COATED ORAL at 08:44

## 2023-01-10 RX ADMIN — CYANOCOBALAMIN TAB 500 MCG 1000 MCG: 500 TAB at 08:43

## 2023-01-10 RX ADMIN — CITALOPRAM HYDROBROMIDE 20 MG: 20 TABLET ORAL at 08:44

## 2023-01-10 RX ADMIN — MEROPENEM 1000 MG: 1 INJECTION, POWDER, FOR SOLUTION INTRAVENOUS at 13:04

## 2023-01-10 RX ADMIN — LEVOTHYROXINE SODIUM 88 MCG: 0.09 TABLET ORAL at 05:04

## 2023-01-10 RX ADMIN — Medication 2 TABLET: at 08:43

## 2023-01-10 RX ADMIN — MEROPENEM 1000 MG: 1 INJECTION, POWDER, FOR SOLUTION INTRAVENOUS at 05:02

## 2023-01-10 RX ADMIN — Medication 1 TABLET: at 08:44

## 2023-01-10 RX ADMIN — PANTOPRAZOLE SODIUM 40 MG: 40 TABLET, DELAYED RELEASE ORAL at 05:04

## 2023-01-10 RX ADMIN — SACUBITRIL AND VALSARTAN 1 TABLET: 24; 26 TABLET, FILM COATED ORAL at 08:44

## 2023-01-10 RX ADMIN — DIGOXIN 125 MCG: 125 TABLET ORAL at 08:44

## 2023-01-10 NOTE — PROGRESS NOTES
Paged by zurdo to reconcile medications well after I left the hospital in the afternoon. No computer on me to do med rec until now. Discharge when ok with primary. Med rec complete for SNF discharge. No script needs to be printed. Meds reconciled.

## 2023-01-10 NOTE — PROGRESS NOTES
Physician Progress Note    1/10/2023   12:05 PM    Name:  José Gallo  MRN:    [id number]      Day: 5     Admit Date: 1/4/2023  6:18 PM  PCP: Zayda Rees MD    Code Status:  Limited    Subjective:     Denies complaints    Current Facility-Administered Medications   Medication Dose Route Frequency Provider Last Rate Last Admin    traMADol (ULTRAM) tablet 50 mg  50 mg Oral Q6H PRN Annamae Lanette, DO   50 mg at 01/09/23 0454    rivaroxaban (XARELTO) tablet 15 mg  15 mg Oral Daily Annamae Lanette, DO   15 mg at 01/09/23 1742    meropenem (MERREM) 1,000 mg in sodium chloride 0.9 % 100 mL IVPB (mini-bag)  1,000 mg IntraVENous Q8H Avis Casas, DO   Stopped at 01/10/23 0553    citalopram (CELEXA) tablet 20 mg  20 mg Oral Daily Annamae Lanette, DO   20 mg at 01/10/23 0844    digoxin (LANOXIN) tablet 125 mcg  125 mcg Oral Daily Annamae Lanette, DO   125 mcg at 01/10/23 0844    lactobacillus acidophilus [facility]) 2 tablet  2 tablet Oral Daily Annamae Lanette, DO   2 tablet at 01/10/23 3825    meclizine (ANTIVERT) tablet 25 mg  25 mg Oral Q6H PRN Annamae Lanette, DO        therapeutic multivitamin-minerals 1 tablet  1 tablet Oral Daily Annamae Lanette, DO   1 tablet at 01/10/23 0844    pantoprazole (PROTONIX) tablet 40 mg  40 mg Oral QAM AC Mick R Mae, DO   40 mg at 01/10/23 0504    levothyroxine (SYNTHROID) tablet 88 mcg  88 mcg Oral QAM AC Mick R Mae, DO   88 mcg at 01/10/23 0504    vitamin B-12 (CYANOCOBALAMIN) tablet 1,000 mcg  1,000 mcg Oral Daily Annamae Lanette, DO   1,000 mcg at 01/10/23 0843    vitamin D (CHOLECALCIFEROL) tablet 2,000 Units  2,000 Units Oral Dinner Annamae Lanette, DO   2,000 Units at 01/09/23 1742    sacubitril-valsartan (ENTRESTO) 24-26 MG per tablet 1 tablet  1 tablet Oral BID Annamae Lanette, DO   1 tablet at 01/10/23 0844    carvedilol (COREG) tablet 6.25 mg  6.25 mg Oral BID Annamae Lanette, DO   6.25 mg at 01/10/23 0844       Physical Examination:      Vitals:  BP 129/80   Pulse 58   Temp 98.2 °F (36.8 °C) (Oral)   Resp 18   Ht 5' (1.524 m)   Wt 214 lb (97.1 kg)   SpO2 93%   BMI 41.79 kg/m²   Temp (24hrs), Av °F (36.7 °C), Min:97.7 °F (36.5 °C), Max:98.2 °F (36.8 °C)      General appearance: Elderly, tired appearing, and chronically ill-appearing. Lungs: clear to auscultation bilaterally, normal effort  Heart: regular rate and rhythm, no murmur  Abdomen: Fullness and dullness to percussion right lower quadrant. Mild tenderness to palpation. No rebound tenderness or guarding. Extremities: no edema, redness, tenderness in the calves. Cap refill <2s    Data:     Labs:  Recent Labs     23  0505   WBC 11.7*   HGB 10.6*        Recent Labs     23  0505      K 4.1      CO2 27   BUN 13   CREATININE 0.46*   GLUCOSE 105*     No results for input(s): AST, ALT, ALB, BILITOT, ALKPHOS in the last 72 hours. CT Abd/Pelvis:  Slight increase seen in size of the complex process in the right lower   quadrant with new presence of gas indicating communication with the bowel. The process appears to be centered around the cecum with involvement as well   seen in the ascending colon and several distal small bowel loops. Findings   concerning for abscess or necrotic mass with possible superimposed infection. Stable 9.1 cm cystic lesion in the left adnexa in which 6-12 month follow-up   can be performed as well as gynecologic consultation as clinically indicated. Assessment and Plan:        1. Sepsis secondary to GNR bacteremia related to complex mass in the right lower quadrant  -IV antibiotics being managed by infectious disease  -Follow surgical pathology from colonoscopy. Noted elevated CEA (19.7)  -Awaiting transport to SNF. Patient medically discharged . Palliative care should continue to follow    Class III obesity  Hypertension  Chronic combined heart failure    Diet: ADULT DIET;  Full Liquid  ADULT ORAL NUTRITION SUPPLEMENT; Breakfast, Dinner, Lunch; Diabetic Oral Supplement  Ppx: lovenox  Limited    >25 minutes in total care time    Electronically signed by Paul Singh DO on 1/10/2023 at 12:05 PM

## 2023-01-10 NOTE — DISCHARGE INSTR - DIET

## 2023-01-10 NOTE — FLOWSHEET NOTE
Hand off care given to Amanda Weiss at [facility].  Son made aware earlier in shift that patient  time is 1030

## 2023-01-10 NOTE — PROGRESS NOTES
Subjective: The patient complains of severe acute on chronic progressive fatigue and abd pain partially relieved by rest, PT, OT and meds   and exacerbated by exertion and recent illness. Initially presented to ED with abdominal pain, weakness, and diarrhea following colonscopy. Patient has a previously noted RLQ mass diagnosed at [facility] in December. Patient was treated with antibiotics and were discontinued. During previous admission, IR was not available for biopsy and patient was scheduled for outpatient colonoscopy for biopsy. Colonoscopy performed on 1/4/23 with biopsy. CT abdomen/pelivs showed slight increase in size of complex process in RLQ with new presence of gas indicating communication with the bowel. Process appears to be centered around the cecum with involvement in the ascending colon and several distal small bowel loops. Findings concerning for abscess or necrotic mass with possible superimposed infection. I am concerned about patients medical complexities including:  Principal Problem:    Gram-negative bacteremia  Active Problems:    Colonic mass    Weakness    Encounter for hospice care discussion    Pneumonia of left lower lobe due to infectious organism    H/O abdominal abscess    Generalized weakness  Resolved Problems:    * No resolved hospital problems. *      .    Reviewed recent nursing note and discussed current status and planned care with acute care providers, \" Pt did not go to [id number] [facility] this evening. Pt still needs ABX prescription \". ROS x10: The patient also complains of severely impaired mobility and activities of daily living.   Otherwise no new problems with vision, hearing, nose, mouth, throat, dermal, cardiovascular, GI, , pulmonary, musculoskeletal, psychiatric or neurological.        Vital signs:  /80   Pulse 58   Temp 98.2 °F (36.8 °C) (Oral)   Resp 18   Ht 5' (1.524 m)   Wt 214 lb (97.1 kg)   SpO2 93%   BMI 41.79 kg/m²   I/O:   PO/Intake:    fair PO intake, monitoring for dysphagia    Bowel/Bladder:   continent,    General:  Patient is well developed, adequately nourished, and    well kempt. HEENT:    PERRLA, hearing intact to loud voice, external inspection of ear and nose benign. Inspection of lips, tongue and gums benign  Musculoskeletal: No significant change in strength or tone. All joints stable. Inspection and palpation of digits and nails show no clubbing, cyanosis or inflammatory conditions. Neuro/Psychiatric: Affect: flat-  Alert and oriented to self and situation with no needed cues. No significant change in deep tendon reflexes or sensation  Lungs:  Diminished, CTA-B  . Respiration effort is normal at rest.   Heart:   S1 = S2,   RRR. Abdomen:  Soft,  mildly-tender    Extremities:  Trace  lower extremity edema but no unusual tenderness.   Skin:   BUE bruises dt blood draws      Rehabilitation:  Physical Therapy:   Bed mobility:  Bed mobility  Bridging: Contact guard assistance (01/09/23 1213)  Rolling to Left: Minimal assistance (01/06/23 1008)  Supine to Sit: Minimal assistance;Contact guard assistance (01/09/23 1213)  Sit to Supine: Minimal assistance (01/09/23 1213)  Scooting: Contact guard assistance (01/09/23 1213)  Bed Mobility Comments: HOB elevated (01/09/23 1213)  Transfers:  Transfers  Sit to Stand: Minimal Assistance;Contact guard assistance (01/09/23 1213)  Stand to Sit: Minimal Assistance;Contact guard assistance (01/09/23 1213)  Bed to Chair: Moderate assistance;2 Person Assistance (to [facility] with ww) (01/06/23 1011)  Gait:   Ambulation  Surface: Level tile (01/09/23 1214)  Device: Rolling Walker (01/09/23 1214)  Other Apparatus:  (O2 4 liters) (01/09/23 1214)  Assistance: Contact guard assistance (01/09/23 1214)  Quality of Gait: Flexed posture, right drop foot, low steppage, mildly unsteady, no LOB (01/09/23 1214)  Distance: 20 feet x3- 1 sitting/ 1 standing RB (01/09/23 1214)  Stairs:     W/C mobility:       Occupational Therapy:   Hand Dominance: Right  ADL  Feeding: Unable to assess(comment) (01/06/23 1009)  Grooming: Stand by assistance; Increased time to complete (01/06/23 1009)  UE Bathing: Contact guard assistance; Increased time to complete (01/06/23 1009)  LE Bathing: Moderate assistance; Increased time to complete (01/06/23 1009)  UE Dressing: Minimal assistance; Increased time to complete (01/06/23 1009)  LE Dressing: Moderate assistance; Increased time to complete (01/06/23 1009)  Toileting: Maximum assistance; Increased time to complete (01/06/23 1009)  Toilet Transfers  Toilet - Technique: Stand step (01/06/23 1012)  Equipment Used: Standard bedside commode (01/06/23 1012)  Toilet Transfer: Moderate assistance (01/06/23 1012)          Speech Therapy:            Diet/Swallow:                   COGNITION  OT: Cognition Comment: follows one step commands with visual cues and demonstration  SP:           Lab/X-ray studies reviewed, analyzed and discussed with patient and staff:   Recent Results (from the past 24 hour(s))   COVID-19, Rapid    Collection Time: 01/10/23  8:53 AM    Specimen: Nasopharyngeal Swab; Nasal swab   Result Value Ref Range    SARS-CoV-2, NAAT Not Detected Not Detected     Previous extensive, complex labs, notes and diagnostics reviewed and analyzed. ALLERGIES:    Allergies as of 01/04/2023 - Fully Reviewed 01/04/2023   Allergen Reaction Noted    Latex  07/19/2017    Tape [adhesive tape]  06/28/2016      (please also verify by checking [facility])     Complex Physical Medicine & Rehab Issues Assess & Plan:   Severe abnormality of gait and mobility and impaired self-care and ADL's secondary to debility after sepsis.   Updated functional and medical status reassessed regarding patients ability to participate in therapies and patient found to be able to participate in:      Skilled    Will continue to follow to attempt to get patient to the most efficient but most effective level of care will be in their best interest.  Continue to focus on energy conservation heart rate and blood pressure monitoring before during and after therapy endurance and consistency of function. Bowel constipation   and Bladder dysfunction   overactive, neurogenic bladder:  frequent toileting, ambulate to bathroom with assistance, check post void residuals. Check for C.difficile x1 if >2 loose stools in 24 hours, continue bowel & bladder program.  Monitor for UTI symptoms including lethargy and confusion    Moderate abd pain and generalized OA pain: reassess pain every shift and prior to and after each therapy session, give prn Tylenol   and consider scheduled Tylenol, modalities prn in therapy, consider Lidoderm, K-pad prn. Skin healing    breakdown   risk:  continue pressure relief program.  Daily skin exams and reports from nursing. Severe fatigue due to immobility and nutritional deficits: improvements in PO intake   Add vitamin B12 vitamin D and CoQ10 titrate dosing and add protein supplementation with low carb content. Complex discharge planning:   Discussed with care team-last 24 hour events noted. I will continue to follow along and reassess functional and medical status as we strive to improve patient's functional and medical outcomes progressing to the most efficient and lowest level of care. Complex Active General Medical Issues that complicate care:     1. Principal Problem:    Gram-negative bacteremia  Active Problems:    Colonic mass    Weakness    Encounter for hospice care discussion    Pneumonia of left lower lobe due to infectious organism    H/O abdominal abscess    Generalized weakness  Resolved Problems:    * No resolved hospital problems. *          Events and functional changes in the past 24 hours reviewed improvements in functional status are encouraging        Focus on coordinating dc plans with patient family and care givers and order necessary equipment.           Kenyon Delaney D.O., PM&R     Attending    [address]

## 2023-01-10 NOTE — PROGRESS NOTES
Pt did not go to [id number] [facility] this evening.   Pt still needs ABX prescription

## 2023-01-11 ENCOUNTER — TELEPHONE (OUTPATIENT)
Dept: GASTROENTEROLOGY | Age: 88
End: 2023-01-11

## 2023-01-11 RX ORDER — ALBUTEROL SULFATE 2.5 MG/3ML
2.5 SOLUTION RESPIRATORY (INHALATION) EVERY 4 HOURS PRN
COMMUNITY

## 2023-01-11 NOTE — TELEPHONE ENCOUNTER
Spoke to patient son regarding bx results which showed carcinoma, surgical and oncology referral suggested, he said they will discuss this and will call me later.

## 2023-01-11 NOTE — TELEPHONE ENCOUNTER
Cynthia Derrick, The patient's son came in and stated that his mother was making the decision to get the surgery suggested for her DX of colon cancer. He states that a Ping Colbert at [facility] will do the surgery for the patient. He just wanted to inform you, and  would also like to receive a phone call on what to do next? Please advise. Thank you.

## 2023-01-11 NOTE — TELEPHONE ENCOUNTER
Regis- called and would like to know if the facility can advance the all liquid diet?      Please call if you have any questions Lanette [email or web address]

## 2023-01-11 NOTE — PROGRESS NOTES
Physical Therapy  Facility/Department: [facility] [id number]  Physical Therapy Discharge      NAME: Shasha Giron    : 1932 (80 y.o.)  MRN: [id number]    Account: [de-identified]  Gender: female      Patient has been discharged from acute care hospital. DC patient from current PT program.      Electronically signed by Maira Hester PT on 23 at 8:20 AM EST

## 2023-01-12 LAB — BLOOD CULTURE, ROUTINE: NORMAL

## 2023-01-13 ENCOUNTER — OFFICE VISIT (OUTPATIENT)
Dept: GERIATRIC MEDICINE | Age: 88
End: 2023-01-13

## 2023-01-13 DIAGNOSIS — I48.0 PAROXYSMAL ATRIAL FIBRILLATION (HCC): ICD-10-CM

## 2023-01-13 DIAGNOSIS — K65.1 INTRA-ABDOMINAL ABSCESS (HCC): Primary | ICD-10-CM

## 2023-01-13 DIAGNOSIS — I74.10 AORTIC THROMBUS (HCC): ICD-10-CM

## 2023-01-13 DIAGNOSIS — I50.32 CHRONIC DIASTOLIC CONGESTIVE HEART FAILURE (HCC): ICD-10-CM

## 2023-01-13 NOTE — TELEPHONE ENCOUNTER
Kimberley Galeana is calling from [facility] in regards to patients all liquid diet. She is requesting a call back on her cell @ [phone].

## 2023-01-14 LAB
HCT VFR BLD CALC: 34.1 % (ref 37–47)
HEMOGLOBIN: 11.3 G/DL (ref 12–16)
MCH RBC QN AUTO: 27.4 PG (ref 27–31.3)
MCHC RBC AUTO-ENTMCNC: 33.2 % (ref 33–37)
MCV RBC AUTO: 82.4 FL (ref 79.4–94.8)
PDW BLD-RTO: 16.1 % (ref 11.5–14.5)
PLATELET # BLD: 353 K/UL (ref 130–400)
RBC # BLD: 4.13 M/UL (ref 4.2–5.4)
WBC # BLD: 13 K/UL (ref 4.8–10.8)

## 2023-01-16 LAB
ANION GAP SERPL CALCULATED.3IONS-SCNC: 10 MEQ/L (ref 9–15)
BUN BLDV-MCNC: 5 MG/DL (ref 8–23)
CALCIUM SERPL-MCNC: 8.3 MG/DL (ref 8.5–9.9)
CHLORIDE BLD-SCNC: 106 MEQ/L (ref 95–107)
CO2: 27 MEQ/L (ref 20–31)
CREAT SERPL-MCNC: 0.43 MG/DL (ref 0.5–0.9)
GFR SERPL CREATININE-BSD FRML MDRD: >60 ML/MIN/{1.73_M2}
GLUCOSE BLD-MCNC: 95 MG/DL (ref 70–99)
HCT VFR BLD CALC: 36.2 % (ref 37–47)
HEMOGLOBIN: 11.8 G/DL (ref 12–16)
MCH RBC QN AUTO: 27 PG (ref 27–31.3)
MCHC RBC AUTO-ENTMCNC: 32.6 % (ref 33–37)
MCV RBC AUTO: 82.8 FL (ref 79.4–94.8)
PDW BLD-RTO: 16 % (ref 11.5–14.5)
PLATELET # BLD: 326 K/UL (ref 130–400)
POTASSIUM SERPL-SCNC: 3.8 MEQ/L (ref 3.4–4.9)
RBC # BLD: 4.37 M/UL (ref 4.2–5.4)
SODIUM BLD-SCNC: 143 MEQ/L (ref 135–144)
WBC # BLD: 13.2 K/UL (ref 4.8–10.8)

## 2023-01-16 ASSESSMENT — ENCOUNTER SYMPTOMS
ABDOMINAL DISTENTION: 0
SINUS PAIN: 0
VOMITING: 0
RECTAL PAIN: 0
EYE REDNESS: 0
SINUS PRESSURE: 0
SHORTNESS OF BREATH: 0
CONSTIPATION: 0
NAUSEA: 0
EYE ITCHING: 0
ABDOMINAL PAIN: 0
VOICE CHANGE: 0
BLOOD IN STOOL: 0
APNEA: 0
WHEEZING: 0
SORE THROAT: 0
CHEST TIGHTNESS: 0
DIARRHEA: 0
EYE PAIN: 0
FACIAL SWELLING: 0
TROUBLE SWALLOWING: 0
PHOTOPHOBIA: 0
COLOR CHANGE: 0
BACK PAIN: 0
EYE DISCHARGE: 0
COUGH: 0
RHINORRHEA: 0

## 2023-01-16 NOTE — RESULT ENCOUNTER NOTE
Spoke to patient's son regarding the biopsy results and they are scheduled to see a surgeon at [facility].

## 2023-01-16 NOTE — PROGRESS NOTES
Subjective:      Patient ID: Purvi Schaeffer is a 80 y.o. female New patient, here for evaluation of the following chief complaint(s):  Chief Complaint   Patient presents with    Other     Intraabdominal abscess         HPI  Diagnoses and all orders for this visit:    Intra-abdominal abscess ([address]    Aortic thrombus ([address]    Chronic diastolic congestive heart failure (HCC)    Paroxysmal atrial fibrillation ([address]      At present he denies polyuria,  Polydipsia, constitutional, sinus, visual, cardiopulmonary, urologic, gastrointestinal, immunologic/hematologic, musculoskeletal, neurologic,dermatologic, or psychiatric complaints. Current Outpatient Medications on File Prior to Visit   Medication Sig Dispense Refill    albuterol (PROVENTIL) (2.5 MG/3ML) 0.083% nebulizer solution Take 2.5 mg by nebulization every 4 hours as needed for Wheezing or Shortness of Breath      traMADol (ULTRAM) 50 MG tablet Take 1 tablet by mouth every 6 hours as needed for Pain for up to 14 days. Intended supply: 7 days. Take lowest dose possible to manage pain Max Daily Amount: 200 mg (Patient taking differently: Take 50 mg by mouth every 6 hours as needed for Pain (mod to severe). Intended supply: 7 days. Take lowest dose possible to manage pain) 28 tablet 0    meropenem (MERREM) infusion Infuse 1,000 mg intravenously in the morning and 1,000 mg at noon and 1,000 mg in the evening. Do all this for 11 days. Compound per protocol. . (Patient taking differently: Infuse 1 g intravenously in the morning and 1 g at noon and 1 g in the evening. Indications: Peritoneal Infection. Compound per protocol.   X11 days through 1/21/2023.) 1 each 0    albuterol sulfate HFA (PROVENTIL;VENTOLIN;PROAIR) 108 (90 Base) MCG/ACT inhaler Inhale 2 puffs into the lungs every 6 hours as needed for Wheezing (Patient not taking: Reported on 1/11/2023) 18 g 3    digoxin (LANOXIN) 125 MCG tablet Take 1 tablet by mouth daily (Patient taking differently: Take 125 mcg by mouth daily Indications: Atrial Fibrillation) 30 tablet 3    sacubitril-valsartan (ENTRESTO) 24-26 MG per tablet Take 1 tablet by mouth 2 times daily (Patient taking differently: Take 1 tablet by mouth 2 times daily Indications: Congestive Heart Failure) 60 tablet 1    Lactobacillus TABS Take 2 mg by mouth daily Indications: Gastroesophageal Reflux Disease 500 million CFU daily      rivaroxaban (XARELTO) 15 MG TABS tablet Take 1 tablet by mouth daily (Patient taking differently: Take 15 mg by mouth nightly Indications: Atrial Fibrillation) 90 tablet 3    meclizine (ANTIVERT) 25 MG tablet Take 25 mg by mouth every 6 hours as needed for Dizziness or Nausea      Vitamin D (CHOLECALCIFEROL) 50 MCG (2000 UT) TABS tablet Take 1 tablet by mouth Daily with supper (Patient taking differently: Take 2,000 Units by mouth Daily with supper Indications: Nutritional Support) 60 tablet 5    potassium chloride (KLOR-CON M) 20 MEQ extended release tablet Take 20 mEq by mouth 2 times daily Indications: Nutritional Support, furosemide therapy      furosemide (LASIX) 20 MG tablet TAKE 1 TABLET DAILY (Patient taking differently: Take 20 mg by mouth daily Indications: Congestive Heart Failure) 90 tablet 3    [DISCONTINUED] amLODIPine (NORVASC) 5 MG tablet Take 1 tablet by mouth daily 90 tablet 3    pantoprazole (PROTONIX) 40 MG tablet TAKE 1 TABLET DAILY (Patient taking differently: Take 40 mg by mouth daily Indications: Gastroesophageal Reflux Disease) 90 tablet 3    carvedilol (COREG) 6.25 MG tablet TAKE 1 TABLET TWICE A DAY (Patient taking differently: Take 6.25 mg by mouth 2 times daily Indications: Congestive Heart Failure) 180 tablet 3    nitroGLYCERIN (NITROSTAT) 0.4 MG SL tablet up to max of 3 total doses. If no relief after 1 dose, call 911. (Patient taking differently: Place 0.4 mg under the tongue every 5 minutes as needed for Chest pain up to max of 3 total doses.  If no relief after 1 dose, call 911.) 25 tablet 3 ferrous sulfate (IRON 325) 325 (65 Fe) MG tablet Take 1 tablet by mouth 2 times daily (with meals) (Patient taking differently: Take 325 mg by mouth 2 times daily (with meals) Indications: Anemia) 30 tablet 3    Carboxymethylcellulose Sodium (EYE DROPS OP) Place 1 drop into both eyes as needed (Dry eyes) Indications: Systane      Boswellia-Glucosamine-Vit D (OSTEO BI-FLEX ONE PER DAY) TABS Take 1 tablet by mouth daily Indications: Nutritional Support Triple strength      Multiple Vitamins-Minerals (CENTRUM SILVER ULTRA WOMENS) TABS Take 1 tablet by mouth daily Indications: Nutritional Support      vitamin B-12 (CYANOCOBALAMIN) 1000 MCG tablet Take 1,000 mcg by mouth daily Indications: Nutritional Support      citalopram (CELEXA) 20 MG tablet Take 20 mg by mouth daily Indications: Depression      SYNTHROID 88 MCG tablet Take 88 mcg by mouth every morning (before breakfast) Indications: Underactive Thyroid       No current facility-administered medications on file prior to visit.       Latex and Tape [adhesive tape]  Past Medical History:   Diagnosis Date    Ascending aortic aneurysm 6/23/2017    Charcot Arleen Tooth muscular atrophy     CHF (congestive heart failure) (HCC)     Chronic diastolic CHF (congestive heart failure) [facility] 4/1/2022    Depression     Descending aortic aneurysm ([facility] 6/23/2017    Essential hypertension 2/16/2018    Headache     HTN (hypertension)     Impaired mobility and activities of daily living     Lumbar stenosis with neurogenic claudication     Myelopathy [facility])     Osteoarthritis      Past Surgical History:   Procedure Laterality Date    COLONOSCOPY N/A 1/4/2023    COLONOSCOPY DIAGNOSTIC performed by Francois Mccartney MD at [facility]    IR INS PICC VAD W SQ PORT GREATER THAN 5  9/23/2022    IR INS PICC VAD W SQ PORT GREATER THAN 5 9/23/2022 [facility]ED[facility]    JOINT REPLACEMENT Bilateral     knees    OTHER SURGICAL HISTORY Right 07/21/2022    cat scan guided abdominal mass aspiration with drain placement by Dr. Eugenia Bain Left 02/25/2021    LEFT PLEURAL CATHETER INSERTION performed by Robyn Reyes MD at [place] [id number] Left 01/29/2021    total of 755 cc removed per Dr Francisca Deng specimen sent to lab     Social History     Socioeconomic History    Marital status:       Spouse name: Not on file    Number of children: 2    Years of education: Not on file    Highest education level: Not on file   Occupational History    Not on file   Tobacco Use    Smoking status: Never    Smokeless tobacco: Never   Vaping Use    Vaping Use: Never used   Substance and Sexual Activity    Alcohol use: No     Alcohol/week: 0.0 standard drinks    Drug use: No    Sexual activity: Not Currently   Other Topics Concern    Not on file   Social History Narrative    , Lives With: Alone, son lives down the street, dtr is in the area    Type of Home: [address]: One level    Home Access: Stairs to enter with rails- Number of Steps: 2- Rails: Both    Bathroom Shower/Tub: Tub/Shower unit, Bathroom Equipment: Grab bars in shower, Shower chair    Home Equipment: Rolling walker, Cane(Pt infrequemtly uses DME for ambulation and prefers to furniture walk in home)    ADL Assistance: Independent, [address]: Independent    Homemaking Responsibilities: Yes    Ambulation Assistance: Independent, Transfer Assistance: Independent    Additional Comments: Son stops over 2 times daily         Social Determinants of Health     Financial Resource Strain: Not on file   Food Insecurity: Not on file   Transportation Needs: Not on file   Physical Activity: Not on file   Stress: Not on file   Social Connections: Not on file   Intimate Partner Violence: Not on file   Housing Stability: Not on file     Family History   Problem Relation Age of Onset    Arthritis Mother     Arthritis Father     High Blood Pressure Father     Colon Cancer Brother Review of Systems   Constitutional:  Negative for chills, diaphoresis, fatigue and fever. HENT:  Negative for congestion, dental problem, drooling, ear discharge, ear pain, facial swelling, hearing loss, mouth sores, nosebleeds, postnasal drip, rhinorrhea, sinus pressure, sinus pain, sneezing, sore throat, tinnitus, trouble swallowing and voice change. Eyes:  Negative for photophobia, pain, discharge, redness, itching and visual disturbance. Respiratory:  Negative for apnea, cough, chest tightness, shortness of breath and wheezing. Cardiovascular:  Negative for chest pain, palpitations and leg swelling. Gastrointestinal:  Negative for abdominal distention, abdominal pain, blood in stool, constipation, diarrhea, nausea, rectal pain and vomiting. Endocrine: Negative for cold intolerance, heat intolerance, polydipsia, polyphagia and polyuria. Genitourinary:  Negative for decreased urine volume, difficulty urinating, dysuria, flank pain, frequency, genital sores, hematuria and urgency. Musculoskeletal:  Negative for arthralgias, back pain, gait problem, joint swelling, myalgias, neck pain and neck stiffness. Skin:  Negative for color change, rash and wound. Allergic/Immunologic: Negative for environmental allergies and food allergies. Neurological:  Negative for dizziness, tremors, seizures, syncope, facial asymmetry, speech difficulty, weakness, light-headedness, numbness and headaches. Hematological:  Negative for adenopathy. Does not bruise/bleed easily. Psychiatric/Behavioral:  Negative for agitation, confusion, decreased concentration, hallucinations, self-injury, sleep disturbance and suicidal ideas. The patient is not nervous/anxious. Objective: There were no vitals taken for this visit. Physical Exam  Constitutional:       General: She is not in acute distress. Appearance: She is well-developed. HENT:      Head: Normocephalic.       Right Ear: External ear normal.      Left Ear: External ear normal.   Eyes:      Conjunctiva/sclera: Conjunctivae normal.   Neck:      Vascular: No JVD. Trachea: No tracheal deviation. Cardiovascular:      Rate and Rhythm: Normal rate and regular rhythm. Heart sounds: Normal heart sounds. Pulmonary:      Effort: Pulmonary effort is normal. No respiratory distress. Breath sounds: Normal breath sounds. No wheezing or rales. Chest:      Chest wall: No tenderness. Abdominal:      General: Bowel sounds are normal. There is no distension. Palpations: Abdomen is soft. There is no mass. Tenderness: There is no abdominal tenderness. There is no guarding or rebound. Musculoskeletal:         General: No tenderness or deformity. Cervical back: Neck supple. Skin:     General: Skin is warm and dry. Coloration: Skin is not pale. Findings: No erythema or rash. Neurological:      Mental Status: She is alert and oriented to person, place, and time. Motor: No abnormal muscle tone. Psychiatric:         Thought Content: Thought content normal.         Judgment: Judgment normal.       Assessment:       Diagnosis Orders   1. Intra-abdominal abscess ([address]        2. Aortic thrombus (HCC)        3. Chronic diastolic congestive heart failure (HCC)        4. Paroxysmal atrial fibrillation ([address]             No results found for: LIPIDPAN, BMP, CMP, CBC, CBCAUTODIF  Plan:      Fannie Long was seen today for other. Diagnoses and all orders for this visit:    Intra-abdominal abscess ([address]    Aortic thrombus (HCC)    Chronic diastolic congestive heart failure (HCC)    Paroxysmal atrial fibrillation (HCC)       No follow-ups on file. On this date 01/16/23 I have spent 30 minutes reviewing previous notes, test results and face to face with the patient discussing the diagnosis and importance of compliance with the treatment plan.      Marvin Amaya MD    Please note, this report has been partially produced using speech recognition software  and may cause  and /or contain errors related to that system including grammar, punctuation and spelling as well as words and phrases that may seem inappropriate. If there are questions or concerns please feel free to contact me to clarify.

## 2023-01-20 LAB
ANION GAP SERPL CALCULATED.3IONS-SCNC: 9 MEQ/L (ref 9–15)
BUN BLDV-MCNC: 8 MG/DL (ref 8–23)
CALCIUM SERPL-MCNC: 8.7 MG/DL (ref 8.5–9.9)
CHLORIDE BLD-SCNC: 101 MEQ/L (ref 95–107)
CO2: 29 MEQ/L (ref 20–31)
CREAT SERPL-MCNC: 0.45 MG/DL (ref 0.5–0.9)
GFR SERPL CREATININE-BSD FRML MDRD: >60 ML/MIN/{1.73_M2}
GLUCOSE BLD-MCNC: 91 MG/DL (ref 70–99)
HCT VFR BLD CALC: 31.2 % (ref 37–47)
HEMOGLOBIN: 10.2 G/DL (ref 12–16)
MCH RBC QN AUTO: 27.1 PG (ref 27–31.3)
MCHC RBC AUTO-ENTMCNC: 32.7 % (ref 33–37)
MCV RBC AUTO: 82.9 FL (ref 79.4–94.8)
PDW BLD-RTO: 15.8 % (ref 11.5–14.5)
PLATELET # BLD: 276 K/UL (ref 130–400)
POTASSIUM SERPL-SCNC: 3.5 MEQ/L (ref 3.4–4.9)
RBC # BLD: 3.76 M/UL (ref 4.2–5.4)
SODIUM BLD-SCNC: 139 MEQ/L (ref 135–144)
WBC # BLD: 9.4 K/UL (ref 4.8–10.8)

## 2023-01-24 DIAGNOSIS — R10.31 RIGHT LOWER QUADRANT ABDOMINAL PAIN: ICD-10-CM

## 2023-01-27 ENCOUNTER — OFFICE VISIT (OUTPATIENT)
Dept: GERIATRIC MEDICINE | Age: 88
End: 2023-01-27

## 2023-01-27 DIAGNOSIS — R10.31 RIGHT LOWER QUADRANT ABDOMINAL PAIN: Primary | ICD-10-CM

## 2023-01-27 DIAGNOSIS — R53.1 GENERALIZED WEAKNESS: ICD-10-CM

## 2023-01-27 DIAGNOSIS — I48.0 PAROXYSMAL ATRIAL FIBRILLATION (HCC): ICD-10-CM

## 2023-01-27 DIAGNOSIS — E66.01 SEVERE OBESITY (BMI 35.0-39.9) WITH COMORBIDITY (HCC): ICD-10-CM

## 2023-01-30 LAB
HCT VFR BLD CALC: 32.4 % (ref 37–47)
HEMOGLOBIN: 10.5 G/DL (ref 12–16)
MCH RBC QN AUTO: 27.2 PG (ref 27–31.3)
MCHC RBC AUTO-ENTMCNC: 32.5 % (ref 33–37)
MCV RBC AUTO: 83.7 FL (ref 79.4–94.8)
PDW BLD-RTO: 15.8 % (ref 11.5–14.5)
PLATELET # BLD: 256 K/UL (ref 130–400)
RBC # BLD: 3.87 M/UL (ref 4.2–5.4)
WBC # BLD: 8.5 K/UL (ref 4.8–10.8)

## 2023-01-31 RX ORDER — TRAMADOL HYDROCHLORIDE 50 MG/1
50 TABLET ORAL EVERY 6 HOURS PRN
Qty: 40 TABLET | Refills: 0 | Status: SHIPPED | OUTPATIENT
Start: 2023-01-31 | End: 2023-02-14

## 2023-02-01 ENCOUNTER — OFFICE VISIT (OUTPATIENT)
Dept: GERIATRIC MEDICINE | Age: 88
End: 2023-02-01
Payer: MEDICARE

## 2023-02-01 DIAGNOSIS — M62.81 MUSCLE WEAKNESS (GENERALIZED): ICD-10-CM

## 2023-02-01 DIAGNOSIS — I50.43 CHF (CONGESTIVE HEART FAILURE), NYHA CLASS I, ACUTE ON CHRONIC, COMBINED (HCC): ICD-10-CM

## 2023-02-01 DIAGNOSIS — G95.9 MYELOPATHY (HCC): ICD-10-CM

## 2023-02-01 DIAGNOSIS — I48.0 PAROXYSMAL ATRIAL FIBRILLATION (HCC): Primary | ICD-10-CM

## 2023-02-01 PROCEDURE — G8484 FLU IMMUNIZE NO ADMIN: HCPCS | Performed by: INTERNAL MEDICINE

## 2023-02-01 PROCEDURE — G9692 HOSP RECD BY PT DUR MSMT PER: HCPCS | Performed by: INTERNAL MEDICINE

## 2023-02-01 PROCEDURE — 99305 1ST NF CARE MODERATE MDM 35: CPT | Performed by: INTERNAL MEDICINE

## 2023-02-07 ENCOUNTER — OFFICE VISIT (OUTPATIENT)
Dept: GERIATRIC MEDICINE | Age: 88
End: 2023-02-07
Payer: MEDICARE

## 2023-02-07 DIAGNOSIS — E03.9 HYPOTHYROIDISM, UNSPECIFIED TYPE: ICD-10-CM

## 2023-02-07 DIAGNOSIS — R53.1 GENERALIZED WEAKNESS: Primary | ICD-10-CM

## 2023-02-07 DIAGNOSIS — R10.31 RIGHT LOWER QUADRANT ABDOMINAL PAIN: ICD-10-CM

## 2023-02-07 DIAGNOSIS — R26.2 DIFFICULTY IN WALKING: ICD-10-CM

## 2023-02-07 DIAGNOSIS — E87.6 HYPOKALEMIA: ICD-10-CM

## 2023-02-07 LAB
ANION GAP SERPL CALCULATED.3IONS-SCNC: 10 MEQ/L (ref 9–15)
BUN BLDV-MCNC: 9 MG/DL (ref 8–23)
CALCIUM SERPL-MCNC: 8.7 MG/DL (ref 8.5–9.9)
CHLORIDE BLD-SCNC: 106 MEQ/L (ref 95–107)
CO2: 25 MEQ/L (ref 20–31)
CREAT SERPL-MCNC: 0.43 MG/DL (ref 0.5–0.9)
GFR SERPL CREATININE-BSD FRML MDRD: >60 ML/MIN/{1.73_M2}
GLUCOSE BLD-MCNC: 100 MG/DL (ref 70–99)
HCT VFR BLD CALC: 32.5 % (ref 37–47)
HEMOGLOBIN: 10.5 G/DL (ref 12–16)
MCH RBC QN AUTO: 27.3 PG (ref 27–31.3)
MCHC RBC AUTO-ENTMCNC: 32.4 % (ref 33–37)
MCV RBC AUTO: 84.4 FL (ref 79.4–94.8)
PDW BLD-RTO: 16.2 % (ref 11.5–14.5)
PLATELET # BLD: 267 K/UL (ref 130–400)
POTASSIUM SERPL-SCNC: 3.1 MEQ/L (ref 3.4–4.9)
RBC # BLD: 3.85 M/UL (ref 4.2–5.4)
SODIUM BLD-SCNC: 141 MEQ/L (ref 135–144)
WBC # BLD: 9 K/UL (ref 4.8–10.8)

## 2023-02-07 PROCEDURE — 99309 SBSQ NF CARE MODERATE MDM 30: CPT | Performed by: NURSE PRACTITIONER

## 2023-02-07 PROCEDURE — G9692 HOSP RECD BY PT DUR MSMT PER: HCPCS | Performed by: NURSE PRACTITIONER

## 2023-02-07 PROCEDURE — G8484 FLU IMMUNIZE NO ADMIN: HCPCS | Performed by: NURSE PRACTITIONER

## 2023-02-08 LAB — POTASSIUM SERPL-SCNC: 3.8 MEQ/L (ref 3.4–4.9)

## 2023-02-09 VITALS
TEMPERATURE: 97.7 F | RESPIRATION RATE: 20 BRPM | DIASTOLIC BLOOD PRESSURE: 74 MMHG | SYSTOLIC BLOOD PRESSURE: 114 MMHG | HEART RATE: 81 BPM | OXYGEN SATURATION: 97 %

## 2023-02-10 NOTE — PROGRESS NOTES
Name: [address] [place] [address] Drive: [address]  Dameon Gallegos  Date: 1/10/2023     Subjective:     Chief Complaint   Patient presents with    New Patient       HPI  Saray León is a 80 y.o. female being seen today for evaluation of abdominal pain, colonic mass, generalized weakness and impaired mobility and ADLs, and combined systolic and diastolic heart failure. Resident was recently released from [facility] status post sepsis secondary to bacteremia, has a colonic mass in the right lower quadrant. She was admitted to [facility] for acute rehab secondary to debility and deconditioning. She has a history of heart failure respiratory failure coronary artery disease aortic aneurysm headache depression lumbar stenosis myelopathy osteoarthritis atrial fibrillation. She is lying in bed, well-developed, no acute distress. She is alert and oriented to person place and time although speaks [place], understands some [place], has communication barriers. Does complain of mild pain of the abdomen, abdomen is large, mildly distended. Vital signs stable no fever. She is supposed to be evaluated by PT and OT department today or tomorrow, will be on bedrest until ambulatory status is determined. She denies complaints of chest pain chest palpitation regular heartbeat lightheadedness dizziness nausea vomiting diarrhea constipation. Initiate current plan of care.     Past Medical History:   Diagnosis Date    Ascending aortic aneurysm 6/23/2017    Charcot Arleen Tooth muscular atrophy     CHF (congestive heart failure) (HCC)     Chronic diastolic CHF (congestive heart failure) ([address] 4/1/2022    Depression     Descending aortic aneurysm ([facility] 6/23/2017    Essential hypertension 2/16/2018    Headache     HTN (hypertension)     Impaired mobility and activities of daily living     Lumbar stenosis with neurogenic claudication     Myelopathy ([address] Osteoarthritis          Allergies:  Reviewed in nursing facility records  Medications:  Reviewed and reconciled in nursing facility records    Review of Systems  Pertinent positives as noted in HPI. Objective:   /74   Pulse 81   Temp 97.7 °F (36.5 °C)   Resp 20   SpO2 97%     Physical Exam  Constitutional:  in bed, well-developed, in no acute distess  Pulmonary/Chest:  breathing unlabored, chest excursion symmetrical, no use of accessory muscles, lung sounds clear, no shortness of breath at rest, dyspnea with activity  Cardiovascular:  S1-S2 regular, no murmur, 2+ DP x 2, no edema  Abd/GI:  abdomen soft, round, mildly distended, non-tender, no palpable masses, active bowel sounds x 4 quadrants  : no SP tenderness,no CVA tenderness, no hematuria  MS/Extremities:  DOAN, ROM limited, abnormal gait, no clubbing, no cyanosis  Psych:  A/O x 3, cooperative with care, no anxiety, appropriate mood and affect  Skin:  warm and dry, color normal, no lesions, no rashes      Diagnosis Orders   1. Acute on chronic combined systolic (congestive) and diastolic (congestive) heart failure ([address]        2. Right lower quadrant abdominal pain        3. Colonic mass        4. Generalized weakness        5. Impaired mobility and ADLs              Assessment and Plan:      1. Acute on chronic combined systolic (congestive) and diastolic (congestive) heart failure (HCC)  Symptoms at baseline, continue Entresto, metoprolol, diuretics, and potassium as ordered. 2. Right lower quadrant abdominal pain/Colonic mass  Mild pain, continue Tylenol as ordered. 3. Generalized weakness/Impaired mobility and ADLs  PT and OT evaluate and treat with focus on strengthening ADLs gait balance endurance. Reviewed labs:  Yes    I have reviewed the patient's medical history in detail and updated the computerized patient record.     This note was partially generated using Dragon voice recognition system, and there may be some incorrect words, spellings, punctuation that were not noticed in checking the note before saving.     Scott Louie, APRN-CNP

## 2023-02-14 ENCOUNTER — OFFICE VISIT (OUTPATIENT)
Dept: GERIATRIC MEDICINE | Age: 88
End: 2023-02-14
Payer: MEDICARE

## 2023-02-14 DIAGNOSIS — R53.1 GENERALIZED WEAKNESS: Primary | ICD-10-CM

## 2023-02-14 DIAGNOSIS — R10.31 RIGHT LOWER QUADRANT ABDOMINAL PAIN: ICD-10-CM

## 2023-02-14 DIAGNOSIS — R26.2 DIFFICULTY IN WALKING: ICD-10-CM

## 2023-02-14 PROCEDURE — G9692 HOSP RECD BY PT DUR MSMT PER: HCPCS | Performed by: NURSE PRACTITIONER

## 2023-02-14 PROCEDURE — G8484 FLU IMMUNIZE NO ADMIN: HCPCS | Performed by: NURSE PRACTITIONER

## 2023-02-14 PROCEDURE — 99308 SBSQ NF CARE LOW MDM 20: CPT | Performed by: NURSE PRACTITIONER

## 2023-02-26 VITALS
SYSTOLIC BLOOD PRESSURE: 122 MMHG | OXYGEN SATURATION: 93 % | RESPIRATION RATE: 18 BRPM | TEMPERATURE: 97.3 F | WEIGHT: 182.4 LBS | BODY MASS INDEX: 35.62 KG/M2 | HEART RATE: 71 BPM | DIASTOLIC BLOOD PRESSURE: 73 MMHG

## 2023-02-26 PROBLEM — E66.01 SEVERE OBESITY (BMI 35.0-39.9) WITH COMORBIDITY (HCC): Status: ACTIVE | Noted: 2023-01-01

## 2023-02-27 NOTE — PROGRESS NOTES
Name: [address] [place] [address] Drive: [address]  Dameon Gallegos  Date: 1/27/2023     Subjective:     Chief Complaint   Patient presents with    Follow-up       HPI  Arelis Matthew is a 80 y.o. female being seen today for evaluation of right lower quadrant abdominal pain status post malignant neoplasm of colon, obesity, A-fib, and weakness. Resident has been participating PT and OT at [facility] secondary to debility and deconditioning. She has colon cancer and has had 3 referrals, last consult with was with physician at [facility] [employer] clinic who states that she is high risk for poor outcome if they operate on her colon cancer.,  Her son and daughter-in-law want to get her stronger so they can take her home. Resident has a language barrier as she speaks [place] and communicates via son who interprets and interpretation boards. Pain in the abdomen is decreased, she says her stomach hurts some when she eats but not bad. She is on a full liquid diet which the son wants to have advanced to regular food as he states \"she eats regular food at home. \"  She is on Carafate and a PPI. Son does not necessarily want hospice at this point but like to get her home and consider hospice in the future. She has chronic A-fib and is on Xarelto and digoxin and beta-blocker for rate control, good effect. She is heart failure is on Entresto with good effect. Heart failure symptoms are at baseline heart rate is regular, heart rate is 71. Abdomen soft nondistended. She is on tramadol for pain control which she states pain is 2-3 out of 10 on a scale of 10. Pain is exacerbated when she eats and it is minor otherwise no pain. She requires assist of 1 for ADLs mobility transfers dressing grooming.   She is not at her baseline level of function prior to admission, son wants to get her to the point where she can toilet herself at home, they have caregivers that come in the home to help with bathing and all the other activities. She denies complaints chest pain chest palpitation irregular heartbeat lightheadedness dizziness nausea vomiting diarrhea constipation hematuria dysuria. Continue current plan of care. Past Medical History:   Diagnosis Date    Ascending aortic aneurysm 6/23/2017    Charcot Arleen Tooth muscular atrophy     CHF (congestive heart failure) ([facility])     Chronic diastolic CHF (congestive heart failure) ([facility] 4/1/2022    Depression     Descending aortic aneurysm ([facility] 6/23/2017    Essential hypertension 2/16/2018    Headache     HTN (hypertension)     Impaired mobility and activities of daily living     Lumbar stenosis with neurogenic claudication     Myelopathy (HCC)     Osteoarthritis          Allergies:  Reviewed in nursing facility records  Medications:  Reviewed and reconciled in nursing facility records    Review of Systems  Pertinent positives as noted in HPI. Objective:   /73   Pulse 71   Temp 97.3 °F (36.3 °C)   Resp 18   Wt 182 lb 6.4 oz (82.7 kg)   SpO2 93%   BMI 35.62 kg/m²     Physical Exam  Constitutional:  up in wheelchair, well-developed, in no acute distess  Pulmonary/Chest:  breathing unlabored, chest excursion symmetrical, no use of accessory muscles, lung sounds clear, no shortness of breath at rest, dyspnea with activity  Cardiovascular:  S1-S2 regular, no murmur, 2+ DP x 2, no edema  Abd/GI:  abdomen soft, round, non-distended, non-tender, no masses, active bowel sounds x 4 quadrants  : no SP tenderness,no CVA tenderness, no hematuria  MS/Extremities:  DOAN, ROM limited, abnormal gait, no clubbing, no cyanosis  Psych:  A/O x 3, cooperative with care, no anxiety, appropriate mood and affect  Skin:  warm and dry, color normal, no lesions, no rashes      Diagnosis Orders   1. Right lower quadrant abdominal pain        2. Severe obesity (BMI 35.0-39. 9) with comorbidity (HCC)        3. Paroxysmal atrial fibrillation ([facility]        4.  Generalized weakness              Assessment and Plan:      1. Right lower quadrant abdominal pain  Is controlled, takes tramadol as needed for pain management. 2. Severe obesity (BMI 35.0-39. 9) with comorbidity ([address]  She is on a full liquid diet, she has been losing weight but is plateaued, will advance to soft diet. 3. Paroxysmal atrial fibrillation (HCC)  On Xarelto and beta-blocker and digoxin, continue as ordered. 4. Generalized weakness  PT and OT as ordered with focus on strengthening and ADLs balance      Reviewed labs:  Yes      Time based visit:  time spent 30 minutes>50% spent with counseling and coordination of care. Reviewed with the patient and son: current clinicalstatus, medications, activities and diet. Side effects, adverse effects of the medication prescribed, as well as treatment plan and result expectations. Discussed diagnostic results, other suggested diagnostic testing, prognosis, risk and benefits of treatment options, importance of compliance with treatment options with resident and nursing staff. Reviewed therapy plan and progress with therapists. Reviewed medical record, labs, medications, etc.      I have reviewed the patient's medical history in detail and updated the computerized patient record. This note was partially generated using Dragon voice recognition system, and there may be some incorrect words, spellings, punctuation that were not noticed in checking the note before saving.     Candice Bailey, ADRIENNE-CNP

## 2023-03-03 NOTE — PROGRESS NOTES
SUBJECTIVE:  72-year-old woman seen for follow-up visit for myelopathy afibrillation heart failure patient no acute psychosis no recent headaches no change in her bowel bladder no new RVR patient does of intermittent pain issues stable at this time. ROS: No new nausea vomiting  The rest of the 14 point ROS negative    PHYSICAL EXAM: VSS per facility record  Alert x3 pupils reactive oral Koza moist chest showed no crackles no wheezing cardiovascular showed a regular rate few ectopic beats abdomen soft nontender extremity trace edema    ASSESSMENT & PLAN:   Diagnosis Orders   1. Paroxysmal atrial fibrillation (HCC)        2. Myelopathy ([address]        3. CHF (congestive heart failure), NYHA class I, acute on chronic, combined ([address]        4. Muscle weakness (generalized)          Continue beta-blocker has been diuresing currently. Function stable this time.   Is on digoxin repeat levels pending            Past Medical History:   Diagnosis Date    Ascending aortic aneurysm 6/23/2017    Charcot Arleen Tooth muscular atrophy     CHF (congestive heart failure) ([facility])     Chronic diastolic CHF (congestive heart failure) ([address] 4/1/2022    Depression     Descending aortic aneurysm ([address] 6/23/2017    Essential hypertension 2/16/2018    Headache     HTN (hypertension)     Impaired mobility and activities of daily living     Lumbar stenosis with neurogenic claudication     Myelopathy [facility])     Osteoarthritis          Past Surgical History:   Procedure Laterality Date    COLONOSCOPY N/A 1/4/2023    COLONOSCOPY DIAGNOSTIC performed by Bhargav Montelongo MD at [facility]    IR INS PICC VAD W SQ PORT GREATER THAN 5  9/23/2022    IR INS PICC VAD W SQ PORT GREATER THAN 5 9/23/2022 [facility] PROCEDURE    JOINT REPLACEMENT Bilateral     knees    OTHER SURGICAL HISTORY Right 07/21/2022    cat scan guided abdominal mass aspiration with drain placement by Dr. Trevon Galeana Left 02/25/2021    LEFT PLEURAL CATHETER INSERTION performed by Frantz Avilez MD at [facility] Left 01/29/2021    total of 755 cc removed per Dr Lashay Zuniga specimen sent to lab         Current Outpatient Medications on File Prior to Visit   Medication Sig Dispense Refill    albuterol (PROVENTIL) (2.5 MG/3ML) 0.083% nebulizer solution Take 2.5 mg by nebulization every 4 hours as needed for Wheezing or Shortness of Breath      albuterol sulfate HFA (PROVENTIL;VENTOLIN;PROAIR) 108 (90 Base) MCG/ACT inhaler Inhale 2 puffs into the lungs every 6 hours as needed for Wheezing (Patient not taking: Reported on 1/11/2023) 18 g 3    digoxin (LANOXIN) 125 MCG tablet Take 1 tablet by mouth daily (Patient taking differently: Take 125 mcg by mouth daily Indications: Atrial Fibrillation) 30 tablet 3    sacubitril-valsartan (ENTRESTO) 24-26 MG per tablet Take 1 tablet by mouth 2 times daily (Patient taking differently: Take 1 tablet by mouth 2 times daily Indications: Congestive Heart Failure) 60 tablet 1    Lactobacillus TABS Take 2 mg by mouth daily Indications: Gastroesophageal Reflux Disease 500 million CFU daily      rivaroxaban (XARELTO) 15 MG TABS tablet Take 1 tablet by mouth daily (Patient taking differently: Take 15 mg by mouth nightly Indications: Atrial Fibrillation) 90 tablet 3    meclizine (ANTIVERT) 25 MG tablet Take 25 mg by mouth every 6 hours as needed for Dizziness or Nausea      Vitamin D (CHOLECALCIFEROL) 50 MCG (2000 UT) TABS tablet Take 1 tablet by mouth Daily with supper (Patient taking differently: Take 2,000 Units by mouth Daily with supper Indications: Nutritional Support) 60 tablet 5    potassium chloride (KLOR-CON M) 20 MEQ extended release tablet Take 20 mEq by mouth 2 times daily Indications: Nutritional Support, furosemide therapy      furosemide (LASIX) 20 MG tablet TAKE 1 TABLET DAILY (Patient taking differently: Take 20 mg by mouth daily Indications: Congestive Heart Failure) 90 tablet 3    [DISCONTINUED] amLODIPine (NORVASC) 5 MG tablet Take 1 tablet by mouth daily 90 tablet 3    pantoprazole (PROTONIX) 40 MG tablet TAKE 1 TABLET DAILY (Patient taking differently: Take 40 mg by mouth daily Indications: Gastroesophageal Reflux Disease) 90 tablet 3    carvedilol (COREG) 6.25 MG tablet TAKE 1 TABLET TWICE A DAY (Patient taking differently: Take 6.25 mg by mouth 2 times daily Indications: Congestive Heart Failure) 180 tablet 3    nitroGLYCERIN (NITROSTAT) 0.4 MG SL tablet up to max of 3 total doses. If no relief after 1 dose, call 911. (Patient taking differently: Place 0.4 mg under the tongue every 5 minutes as needed for Chest pain up to max of 3 total doses. If no relief after 1 dose, call 911.) 25 tablet 3    ferrous sulfate (IRON 325) 325 (65 Fe) MG tablet Take 1 tablet by mouth 2 times daily (with meals) (Patient taking differently: Take 325 mg by mouth 2 times daily (with meals) Indications: Anemia) 30 tablet 3    Carboxymethylcellulose Sodium (EYE DROPS OP) Place 1 drop into both eyes as needed (Dry eyes) Indications: Systane      Boswellia-Glucosamine-Vit D (OSTEO BI-FLEX ONE PER DAY) TABS Take 1 tablet by mouth daily Indications: Nutritional Support Triple strength      Multiple Vitamins-Minerals (CENTRUM SILVER ULTRA WOMENS) TABS Take 1 tablet by mouth daily Indications: Nutritional Support      vitamin B-12 (CYANOCOBALAMIN) 1000 MCG tablet Take 1,000 mcg by mouth daily Indications: Nutritional Support      citalopram (CELEXA) 20 MG tablet Take 20 mg by mouth daily Indications: Depression      SYNTHROID 88 MCG tablet Take 88 mcg by mouth every morning (before breakfast) Indications: Underactive Thyroid       No current facility-administered medications on file prior to visit. Family History   Problem Relation Age of Onset    Arthritis Mother     Arthritis Father     High Blood Pressure Father     Colon Cancer Brother        Social History     Socioeconomic History    Marital status:       Spouse name: Not on file Number of children: 2    Years of education: Not on file    Highest education level: Not on file   Occupational History    Not on file   Tobacco Use    Smoking status: Never    Smokeless tobacco: Never   Vaping Use    Vaping Use: Never used   Substance and Sexual Activity    Alcohol use: No     Alcohol/week: 0.0 standard drinks    Drug use: No    Sexual activity: Not Currently   Other Topics Concern    Not on file   Social History Narrative    , Lives With: Alone, son lives down the street, dtr is in the area    Type of Home[address]: One level    Home Access: Stairs to enter with rails- Number of Steps: 2- Rails: Both    Bathroom Shower/Tub: Tub/Shower unit, Bathroom Equipment: Grab bars in shower, Shower chair    Home Equipment: Rolling walker, Cane(Pt infrequemtly uses DME for ambulation and prefers to furniture walk in home)    ADL Assistance: Independent, [address]: Independent    Homemaking Responsibilities: Yes    Ambulation Assistance: Independent, Transfer Assistance: Independent    Additional Comments: Son stops over 2 times daily         Social Determinants of Health     Financial Resource Strain: Not on file   Food Insecurity: Not on file   Transportation Needs: Not on file   Physical Activity: Not on file   Stress: Not on file   Social Connections: Not on file   Intimate Partner Violence: Not on file   Housing Stability: Not on file         No results found for: LABA1C  No results found for: EAG    Lab Results   Component Value Date/Time     02/07/2023 07:11 AM    K 3.8 02/08/2023 06:10 AM    K 4.1 01/09/2023 05:05 AM     02/07/2023 07:11 AM    CO2 25 02/07/2023 07:11 AM    BUN 9 02/07/2023 07:11 AM    CREATININE 0.43 02/07/2023 07:11 AM    GLUCOSE 100 02/07/2023 07:11 AM    CALCIUM 8.7 02/07/2023 07:11 AM        Lab Results   Component Value Date    CHOL 123 02/10/2021    CHOL 154 10/02/2020    CHOL 185 07/24/2017     Lab Results Component Value Date    TRIG 78 02/10/2021    TRIG 87 10/02/2020    TRIG 106 07/24/2017     Lab Results   Component Value Date    HDL 39 (L) 02/10/2021    HDL 52 10/02/2020    HDL 45 07/24/2017     Lab Results   Component Value Date    LDLCALC 68 02/10/2021    LDLCALC 85 10/02/2020    LDLCALC 119 07/24/2017     No results found for: LABVLDL, VLDL  No results found for: [facility]    Lab Results   Component Value Date    TSH 2.690 03/17/2022       Lab Results   Component Value Date    WBC 9.0 02/07/2023    HGB 10.5 (L) 02/07/2023    HCT 32.5 (L) 02/07/2023    MCV 84.4 02/07/2023     02/07/2023       Please note orders entered on site at facility after discussion with appropriate facility nursing/therapy/ / nutritional staff. Current longstanding medical problems and acute medical issues addressed with staff. Available data and data elements in on site paper chart reviewed and analyzed. Current external consultant notes reviewed in on site chart. Ordered laboratory testing and imaging will be reviewed when available.

## 2023-03-06 ENCOUNTER — TELEPHONE (OUTPATIENT)
Dept: GASTROENTEROLOGY | Age: 88
End: 2023-03-06

## 2023-03-06 VITALS
OXYGEN SATURATION: 90 % | HEART RATE: 55 BPM | RESPIRATION RATE: 18 BRPM | TEMPERATURE: 97.7 F | SYSTOLIC BLOOD PRESSURE: 135 MMHG | DIASTOLIC BLOOD PRESSURE: 90 MMHG

## 2023-03-06 DIAGNOSIS — K92.1 GASTROINTESTINAL HEMORRHAGE WITH MELENA: Primary | ICD-10-CM

## 2023-03-06 NOTE — TELEPHONE ENCOUNTER
Xuan from [id number] [facility] home care called asking if you can place a order for a CBC. Patient is having black tarry stool.

## 2023-03-07 LAB
BASOPHILS ABSOLUTE: 0.1 K/UL (ref 0–0.2)
BASOPHILS RELATIVE PERCENT: 0.6 %
EOSINOPHILS ABSOLUTE: 0.1 K/UL (ref 0–0.7)
EOSINOPHILS RELATIVE PERCENT: 0.9 %
HCT VFR BLD CALC: 30.3 % (ref 37–47)
HEMOGLOBIN: 10.1 G/DL (ref 12–16)
LYMPHOCYTES ABSOLUTE: 1.7 K/UL (ref 1–4.8)
LYMPHOCYTES RELATIVE PERCENT: 12.5 %
MCH RBC QN AUTO: 27.7 PG (ref 27–31.3)
MCHC RBC AUTO-ENTMCNC: 33.2 % (ref 33–37)
MCV RBC AUTO: 83.3 FL (ref 79.4–94.8)
MONOCYTES ABSOLUTE: 1 K/UL (ref 0.2–0.8)
MONOCYTES RELATIVE PERCENT: 7.3 %
NEUTROPHILS ABSOLUTE: 11 K/UL (ref 1.4–6.5)
NEUTROPHILS RELATIVE PERCENT: 78.7 %
PDW BLD-RTO: 16.3 % (ref 11.5–14.5)
PLATELET # BLD: 371 K/UL (ref 130–400)
RBC # BLD: 3.64 M/UL (ref 4.2–5.4)
WBC # BLD: 13.9 K/UL (ref 4.8–10.8)

## 2023-03-07 NOTE — PROGRESS NOTES
Name: [address] [place] [address] [facility]  Dameon Gallegos  Date: 2/7/2023     Subjective:     Chief Complaint   Patient presents with    Follow-up       HPI  Ebony Fabry is a 80 y.o. female being seen today for evaluation of acute rehab progress, difficulty walking weakness, right lower quadrant abdominal pain status post malignant neoplasm of colon, hypothyroidism, and hypokalemia. Resident has been participating PT and OT at [facility] secondary to debility and deconditioning. She has colon cancer and has had 3 referrals, last consult with was with [facility] who states that she is high risk for poor outcome if they operate. Her son and daughter-in-law want to get her stronger so they can take her home. Physical therapy reports that she is a tentative discharge from their services on 2/17/2023, she is toileting and requires supervision but she is very close to being moderately independent, she is wearing leg braces appropriately, is ambulating 175 feet with front wheel walker and contact-guard assistance, stairs are contact-guard assistance, totally dependent for oxygen management therefore we are going to attempt to wean her off oxygen as she was not on it prior to admission. Someone to take her home and see if they can manage her care prior to committing her to some long-term facility commitment. Pain in the abdomen is decreased, she says her stomach hurts some when she eats but not bad. She is on a newer diet and is tolerating it well no increased abdominal pain no nausea no vomiting. Her son states that she was eating regular food at home and did not have any problems. She is on Carafate and a PPI. Son does not necessarily want hospice at this point but like to get her home and consider hospice in the future. She is on tramadol for pain control which she states pain is 2-3 out of 10 on a scale of 10.   Pain is exacerbated when she eats and it is minor otherwise no pain. She requires assist of 1 for ADLs mobility transfers dressing grooming. She is close to her baseline level of function prior to admission. Potassium level today was 3.1, will supplement with potassium. She denies complaints chest pain chest palpitation irregular heartbeat lightheadedness dizziness nausea vomiting diarrhea constipation hematuria dysuria. Continue current plan of care. Past Medical History:   Diagnosis Date    Ascending aortic aneurysm 6/23/2017    Charcot Arleen Tooth muscular atrophy     CHF (congestive heart failure) ([facility])     Chronic diastolic CHF (congestive heart failure) ([facility] 4/1/2022    Depression     Descending aortic aneurysm ([facility] 6/23/2017    Essential hypertension 2/16/2018    Headache     HTN (hypertension)     Impaired mobility and activities of daily living     Lumbar stenosis with neurogenic claudication     Myelopathy (HCC)     Osteoarthritis          Allergies:  Reviewed in nursing facility records  Medications:  Reviewed and reconciled in nursing facility records    Review of Systems  Pertinent positives as noted in HPI.       Objective:   BP (!) 135/90   Pulse 55   Temp 97.7 °F (36.5 °C)   Resp 18   SpO2 90%     Physical Exam  Constitutional:  up in wheelchair, well-developed, in no acute distess  Pulmonary/Chest:  breathing unlabored, chest excursion symmetrical, no use of accessory muscles, lung sounds clear, no shortness of breath at rest, dyspnea with activity  Cardiovascular:  S1-S2 regular, no murmur, 2+ DP x 2, no edema  Abd/GI:  abdomen soft, round, non-distended, non-tender, no masses, active bowel sounds x 4 quadrants  : no SP tenderness,no CVA tenderness, no hematuria  MS/Extremities:  DOAN, ROM limited, abnormal gait, no clubbing, no cyanosis  Psych:  A/O x 3, cooperative with care, no anxiety, appropriate mood and affect  Skin:  warm and dry, color normal, no lesions, no rashes      Diagnosis Orders   1. Generalized weakness        2. Difficulty in walking        3. Right lower quadrant abdominal pain        4. Hypothyroidism, unspecified type        5. Hypokalemia              Assessment and Plan:      1. Generalized weakness/difficulty walking  Continue PT and OT as ordered with a focus on strengthening balance gait ADLs    2. Right lower quadrant abdominal pain  Is controlled, takes tramadol Tylenol as needed for pain management. Continue both as ordered. 3.  Hypothyroidism  Continue Synthroid as ordered. 4. Hypokalemia  Potassium chloride 20 mEq p.o. x1. Potassium level in the morning. Reviewed labs:  Yes      Time based visit:  time spent 30 minutes>50% spent with counseling and coordination of care. Reviewed with the patient and son: current clinicalstatus, medications, activities and diet. Side effects, adverse effects of the medication prescribed, as well as treatment plan and result expectations. Discussed diagnostic results, other suggested diagnostic testing, prognosis, risk and benefits of treatment options, importance of compliance with treatment options with resident and nursing staff. Reviewed therapy plan and progress with therapists. Reviewed medical record, labs, medications, etc.      I have reviewed the patient's medical history in detail and updated the computerized patient record. This note was partially generated using Dragon voice recognition system, and there may be some incorrect words, spellings, punctuation that were not noticed in checking the note before saving.     ADRIENNE Anne-CNP

## 2023-03-10 ENCOUNTER — APPOINTMENT (OUTPATIENT)
Dept: CT IMAGING | Age: 88
DRG: 375 | End: 2023-03-10
Payer: MEDICARE

## 2023-03-10 ENCOUNTER — HOSPITAL ENCOUNTER (INPATIENT)
Age: 88
LOS: 1 days | Discharge: NURSING, REHAB OR LONG-TERM CARE | DRG: 375 | End: 2023-03-14
Attending: EMERGENCY MEDICINE | Admitting: INTERNAL MEDICINE
Payer: MEDICARE

## 2023-03-10 ENCOUNTER — APPOINTMENT (OUTPATIENT)
Dept: GENERAL RADIOLOGY | Age: 88
DRG: 375 | End: 2023-03-10
Payer: MEDICARE

## 2023-03-10 DIAGNOSIS — R53.1 GENERALIZED WEAKNESS: Primary | ICD-10-CM

## 2023-03-10 DIAGNOSIS — I48.0 PAROXYSMAL ATRIAL FIBRILLATION (HCC): ICD-10-CM

## 2023-03-10 DIAGNOSIS — R26.9 GAIT DISTURBANCE: ICD-10-CM

## 2023-03-10 DIAGNOSIS — C18.9 MALIGNANT NEOPLASM OF COLON, UNSPECIFIED PART OF COLON (HCC): ICD-10-CM

## 2023-03-10 PROBLEM — R62.7 ADULT FAILURE TO THRIVE: Status: ACTIVE | Noted: 2023-01-01

## 2023-03-10 LAB
ABO/RH: NORMAL
ALBUMIN SERPL-MCNC: 2.8 G/DL (ref 3.5–4.6)
ALP BLD-CCNC: 47 U/L (ref 40–130)
ALT SERPL-CCNC: 11 U/L (ref 0–33)
ANION GAP SERPL CALCULATED.3IONS-SCNC: 7 MEQ/L (ref 9–15)
ANTIBODY SCREEN: NORMAL
AST SERPL-CCNC: 30 U/L (ref 0–35)
BACTERIA: NEGATIVE /HPF
BASOPHILS ABSOLUTE: 0.1 K/UL (ref 0–0.2)
BASOPHILS RELATIVE PERCENT: 0.5 %
BILIRUB SERPL-MCNC: 0.5 MG/DL (ref 0.2–0.7)
BILIRUBIN URINE: NEGATIVE
BLOOD, URINE: NEGATIVE
BUN BLDV-MCNC: 17 MG/DL (ref 8–23)
CALCIUM SERPL-MCNC: 11 MG/DL (ref 8.5–9.9)
CHLORIDE BLD-SCNC: 103 MEQ/L (ref 95–107)
CLARITY: CLEAR
CO2: 25 MEQ/L (ref 20–31)
COLOR: YELLOW
CREAT SERPL-MCNC: 0.72 MG/DL (ref 0.5–0.9)
CRYSTALS, UA: ABNORMAL /HPF
EOSINOPHILS ABSOLUTE: 0.1 K/UL (ref 0–0.7)
EOSINOPHILS RELATIVE PERCENT: 0.4 %
EPITHELIAL CELLS, UA: ABNORMAL /HPF (ref 0–5)
GFR SERPL CREATININE-BSD FRML MDRD: >60 ML/MIN/{1.73_M2}
GLOBULIN: 3.8 G/DL (ref 2.3–3.5)
GLUCOSE BLD-MCNC: 120 MG/DL (ref 70–99)
GLUCOSE URINE: NEGATIVE MG/DL
HCT VFR BLD CALC: 27.9 % (ref 37–47)
HEMOGLOBIN: 9.3 G/DL (ref 12–16)
HYALINE CASTS: ABNORMAL /HPF (ref 0–5)
INR BLD: 3.5
KETONES, URINE: ABNORMAL MG/DL
LACTIC ACID: 1.9 MMOL/L (ref 0.5–2.2)
LEUKOCYTE ESTERASE, URINE: NEGATIVE
LYMPHOCYTES ABSOLUTE: 0.9 K/UL (ref 1–4.8)
LYMPHOCYTES RELATIVE PERCENT: 7.5 %
MCH RBC QN AUTO: 28.3 PG (ref 27–31.3)
MCHC RBC AUTO-ENTMCNC: 33.1 % (ref 33–37)
MCV RBC AUTO: 85.4 FL (ref 79.4–94.8)
MONOCYTES ABSOLUTE: 0.9 K/UL (ref 0.2–0.8)
MONOCYTES RELATIVE PERCENT: 7.6 %
NEUTROPHILS ABSOLUTE: 10.3 K/UL (ref 1.4–6.5)
NEUTROPHILS RELATIVE PERCENT: 84 %
NITRITE, URINE: NEGATIVE
PDW BLD-RTO: 16.3 % (ref 11.5–14.5)
PH UA: 5 (ref 5–9)
PLATELET # BLD: 304 K/UL (ref 130–400)
POTASSIUM SERPL-SCNC: 4.7 MEQ/L (ref 3.4–4.9)
PROTEIN UA: 30 MG/DL
PROTHROMBIN TIME: 35.4 SEC (ref 12.3–14.9)
RBC # BLD: 3.27 M/UL (ref 4.2–5.4)
RBC UA: ABNORMAL /HPF (ref 0–5)
SODIUM BLD-SCNC: 135 MEQ/L (ref 135–144)
SPECIFIC GRAVITY UA: 1.02 (ref 1–1.03)
TOTAL PROTEIN: 6.6 G/DL (ref 6.3–8)
TROPONIN: 0.01 NG/ML (ref 0–0.01)
UROBILINOGEN, URINE: 0.2 E.U./DL
WBC # BLD: 12.3 K/UL (ref 4.8–10.8)
WBC UA: ABNORMAL /HPF (ref 0–5)

## 2023-03-10 PROCEDURE — 83605 ASSAY OF LACTIC ACID: CPT

## 2023-03-10 PROCEDURE — 99285 EMERGENCY DEPT VISIT HI MDM: CPT

## 2023-03-10 PROCEDURE — 81003 URINALYSIS AUTO W/O SCOPE: CPT

## 2023-03-10 PROCEDURE — 86901 BLOOD TYPING SEROLOGIC RH(D): CPT

## 2023-03-10 PROCEDURE — 80053 COMPREHEN METABOLIC PANEL: CPT

## 2023-03-10 PROCEDURE — 70450 CT HEAD/BRAIN W/O DYE: CPT

## 2023-03-10 PROCEDURE — 85025 COMPLETE CBC W/AUTO DIFF WBC: CPT

## 2023-03-10 PROCEDURE — 96361 HYDRATE IV INFUSION ADD-ON: CPT

## 2023-03-10 PROCEDURE — G0378 HOSPITAL OBSERVATION PER HR: HCPCS

## 2023-03-10 PROCEDURE — 93005 ELECTROCARDIOGRAM TRACING: CPT | Performed by: EMERGENCY MEDICINE

## 2023-03-10 PROCEDURE — 86900 BLOOD TYPING SEROLOGIC ABO: CPT

## 2023-03-10 PROCEDURE — 86850 RBC ANTIBODY SCREEN: CPT

## 2023-03-10 PROCEDURE — 36415 COLL VENOUS BLD VENIPUNCTURE: CPT

## 2023-03-10 PROCEDURE — 2580000003 HC RX 258: Performed by: INTERNAL MEDICINE

## 2023-03-10 PROCEDURE — 96360 HYDRATION IV INFUSION INIT: CPT

## 2023-03-10 PROCEDURE — 85610 PROTHROMBIN TIME: CPT

## 2023-03-10 PROCEDURE — 84484 ASSAY OF TROPONIN QUANT: CPT

## 2023-03-10 PROCEDURE — 2580000003 HC RX 258: Performed by: EMERGENCY MEDICINE

## 2023-03-10 PROCEDURE — 71045 X-RAY EXAM CHEST 1 VIEW: CPT

## 2023-03-10 RX ORDER — ONDANSETRON 2 MG/ML
4 INJECTION INTRAMUSCULAR; INTRAVENOUS EVERY 6 HOURS PRN
Status: DISCONTINUED | OUTPATIENT
Start: 2023-03-10 | End: 2023-03-14 | Stop reason: HOSPADM

## 2023-03-10 RX ORDER — ACETAMINOPHEN 650 MG/1
650 SUPPOSITORY RECTAL EVERY 6 HOURS PRN
Status: DISCONTINUED | OUTPATIENT
Start: 2023-03-10 | End: 2023-03-14 | Stop reason: HOSPADM

## 2023-03-10 RX ORDER — 0.9 % SODIUM CHLORIDE 0.9 %
1000 INTRAVENOUS SOLUTION INTRAVENOUS ONCE
Status: COMPLETED | OUTPATIENT
Start: 2023-03-10 | End: 2023-03-10

## 2023-03-10 RX ORDER — SODIUM CHLORIDE 0.9 % (FLUSH) 0.9 %
5-40 SYRINGE (ML) INJECTION EVERY 12 HOURS SCHEDULED
Status: DISCONTINUED | OUTPATIENT
Start: 2023-03-10 | End: 2023-03-14 | Stop reason: HOSPADM

## 2023-03-10 RX ORDER — POLYETHYLENE GLYCOL 3350 17 G/17G
17 POWDER, FOR SOLUTION ORAL DAILY PRN
Status: DISCONTINUED | OUTPATIENT
Start: 2023-03-10 | End: 2023-03-14 | Stop reason: HOSPADM

## 2023-03-10 RX ORDER — SODIUM CHLORIDE 9 MG/ML
INJECTION, SOLUTION INTRAVENOUS PRN
Status: DISCONTINUED | OUTPATIENT
Start: 2023-03-10 | End: 2023-03-14 | Stop reason: HOSPADM

## 2023-03-10 RX ORDER — ACETAMINOPHEN 325 MG/1
650 TABLET ORAL EVERY 6 HOURS PRN
Status: DISCONTINUED | OUTPATIENT
Start: 2023-03-10 | End: 2023-03-14 | Stop reason: HOSPADM

## 2023-03-10 RX ORDER — ONDANSETRON 4 MG/1
4 TABLET, ORALLY DISINTEGRATING ORAL EVERY 8 HOURS PRN
Status: DISCONTINUED | OUTPATIENT
Start: 2023-03-10 | End: 2023-03-14 | Stop reason: HOSPADM

## 2023-03-10 RX ORDER — SODIUM CHLORIDE 0.9 % (FLUSH) 0.9 %
5-40 SYRINGE (ML) INJECTION PRN
Status: DISCONTINUED | OUTPATIENT
Start: 2023-03-10 | End: 2023-03-14 | Stop reason: HOSPADM

## 2023-03-10 RX ADMIN — SODIUM CHLORIDE 1000 ML: 9 INJECTION, SOLUTION INTRAVENOUS at 15:03

## 2023-03-10 RX ADMIN — Medication 10 ML: at 22:04

## 2023-03-10 ASSESSMENT — PAIN - FUNCTIONAL ASSESSMENT
PAIN_FUNCTIONAL_ASSESSMENT: NONE - DENIES PAIN
PAIN_FUNCTIONAL_ASSESSMENT: NONE - DENIES PAIN

## 2023-03-10 ASSESSMENT — ENCOUNTER SYMPTOMS
NAUSEA: 0
VOMITING: 0
SHORTNESS OF BREATH: 0
CHEST TIGHTNESS: 0
SORE THROAT: 0
ABDOMINAL PAIN: 0
EYE PAIN: 0

## 2023-03-10 NOTE — CARE COORDINATION
Case Management Assessment  Initial Evaluation    Date/Time of Evaluation: 3/10/2023 4:48 PM  Assessment Completed by: Cathleen Zuniga RN    If patient is discharged prior to next notation, then this note serves as note for discharge by case management. Patient Name: Joanne Ryan                   YOB: 1932  Diagnosis: No admission diagnoses are documented for this encounter. Date / Time: 3/10/2023  1:59 PM    Patient Admission Status: Emergency   Readmission Risk (Low < 19, Mod (19-27), High > 27): Readmission Risk Score: 28    Current PCP: Lord Kevin MD  PCP verified by CM? Yes    Chart Reviewed: Yes      History Provided by: Child/Family  Patient Orientation: Unable to Assess    Patient Cognition: Alert    Hospitalization in the last 30 days (Readmission):  No        Advance Directives:      Code Status: Prior   Patient's Primary Decision Maker is:      Primary Decision Maker: Lissette Lucian  Alicia - [phone]    Discharge Planning:    Patient lives with: Other (Comment) (SON) Type of Home: [place]  Primary Care Giver: Other (Comment) (UP UNTIL 2 WEEKS AGO PT CARE FOR HERSELF NOW SON & HIS WIFE HELP)  Patient Support Systems include: Children, Other (Comment) (PALL CARE)   Current Financial resources: Medicare (& MANAGED MEDICARE)  Current community resources: Other (Comment) ([facility] PALLIATIVE CARE)  Current services prior to admission: Transitional Care, Home Care ([facility] PALLIATIVE CARE)            Current DME:  [employer]            Type of Home Care services:  [address]  Prior functional level: Independent in ADLs/IADLs, Assistance with the following:, Mobility, Cooking, Housework, Shopping  Current functional level: Assistance with the following:, Bathing, Dressing, Toileting, Mobility, Cooking, Housework, Shopping      Family can provide assistance at DC:  Yes  Would you like Case Management to discuss the discharge plan with any other family members/significant others, and if so, who? Yes (SON MEET)  Plans to Return to Present Housing: Unknown at present  Other Identified Issues/Barriers to RETURNING to current housing: BE ABLE TO AMBULATE SAFELY   Potential Assistance needed at discharge: N/A           Patient expects to discharge to: [address]  [address]: MEDICARE / Plan: MEDICARE PART A AND B / Product Type: *No Product type* /     Does insurance require precert for SNF:     Potential assistance Purchasing Medications: No  Meds-to-Beds request:        [facility] [address] [facility] [address]  [address] [place] [address]  Phone: [phone] Fax: [phone] [place] [address] [phone] -  [phone]  [address] [place] [address]  Phone: [phone] Fax: [phone]    [address] - [address] [address] [facility] [address]  [address] [address] [facility] [address]  [place] [address] [address]  Phone: [phone] Fax: [address]  [address]  [address] [place] [address]  Phone: [phone] Fax: [phone]              Notes:    Factors facilitating achievement of predicted outcomes:     Barriers to discharge: Heath Marilyn SAFELY AT HOME IF Daniel Bonilla [address]    Additional Case Management Notes: SON REQUESTS KOBRICE, PT CURRENT [facility] & [address] (AIDE) PT WAS ABLE TO CARE FOR HERSELF UP UNTIL ABOUT 2 WEEKS AGO. The Plan for Transition of Care is related to the following treatment goals of No admission diagnoses are documented for this encounter.     IF APPLICABLE: The Patient and/or patient representative Twin Prather and her family were provided with a choice of provider and agrees with the discharge plan. Freedom of choice list with basic dialogue that supports the patient's individualized plan of care/goals and shares the quality data associated with the providers was provided to: Patient ([address] [facility] PCP)   Patient Representative Name:   Susan Patel    The Patient and/or Patient Representative Agree with the Discharge Plan?  Yes (SON MEET WANTING Mian Kapadia)    Ariana Anders RN  Case Management Department

## 2023-03-10 NOTE — ED PROVIDER NOTES
[facility] ED  EMERGENCY DEPARTMENT ENCOUNTER      Pt Name: Miladis Small  MRN: [id number]  [place] 7/12/1932  Date of evaluation: 3/10/2023  Provider: Angelica Lewis DO    CHIEF COMPLAINT       Chief Complaint   Patient presents with    Fatigue     Per son, patient has had increased weakness x3 days. Patient has had episodes of nearly falling. Per son, he caught patient and lowered her to the ground both times. Denies head injury. Denies LOC. Denies injury. HISTORY OF PRESENT ILLNESS   (Location/Symptom, Timing/Onset, Context/Setting, Quality, Duration, Modifying Factors, Severity)  Note limiting factors. Miladis Small is a 80 y.o. female who presents to the emergency department . Patient brought in because she has become progressively more weak over the last 3 days. Son had to lower her to the ground today before she fell. Patient has known colorectal cancer and has blood in her stool. 3 days ago hemoglobin was 10. Patient also has history of CHF descending aortic aneurysm hypertension. She is being seen by palliative care for some symptom management and she is DNR but son agrees to do blood transfusion if it would make her feel better. [facility]    Nursing Notes were reviewed. REVIEW OF SYSTEMS    (2-9 systems for level 4, 10 or more for level 5)     Review of Systems   Constitutional:  Negative for activity change, appetite change, fatigue and fever. HENT:  Negative for congestion and sore throat. Eyes:  Negative for pain and visual disturbance. Respiratory:  Negative for chest tightness and shortness of breath. Cardiovascular:  Negative for chest pain. Gastrointestinal:  Negative for abdominal pain, nausea and vomiting. Endocrine: Negative for polydipsia. Genitourinary:  Negative for flank pain and urgency. Musculoskeletal:  Positive for gait problem. Negative for neck stiffness. Skin:  Negative for rash.    Neurological:  Positive for weakness and light-headedness. Negative for headaches. Psychiatric/Behavioral:  Negative for confusion and sleep disturbance. Except as noted above the remainder of the review of systems was reviewed and negative.        PAST MEDICAL HISTORY     Past Medical History:   Diagnosis Date    Ascending aortic aneurysm 06/23/2017    Charcot Arleen Tooth muscular atrophy     CHF (congestive heart failure) (HCC)     Chronic diastolic CHF (congestive heart failure) ([facility] 04/01/2022    Depression     Descending aortic aneurysm ([facility] 06/23/2017    Essential hypertension 02/16/2018    Headache     HTN (hypertension)     Impaired mobility and activities of daily living     Lumbar stenosis with neurogenic claudication     Myelopathy (HCC)     Osteoarthritis     PAF (paroxysmal atrial fibrillation) ([facility]     XARELTO         SURGICAL HISTORY       Past Surgical History:   Procedure Laterality Date    COLONOSCOPY N/A 1/4/2023    COLONOSCOPY DIAGNOSTIC performed by Luis Pisano MD at [facility]    IR INS PICC VAD W SQ PORT GREATER THAN 5  9/23/2022    IR INS PICC VAD W SQ PORT GREATER THAN 5 9/23/2022 [facility] SPECIAL PROCEDURE    JOINT REPLACEMENT Bilateral     knees    OTHER SURGICAL HISTORY Right 07/21/2022    cat scan guided abdominal mass aspiration with drain placement by Dr. Nacho Cohen Left 02/25/2021    LEFT PLEURAL CATHETER INSERTION performed by Dereck Butcher MD at [place] [address] Left 01/29/2021    total of 755 cc removed per Dr David Mathias specimen sent to lab         CURRENT MEDICATIONS       Previous Medications    ALBUTEROL (PROVENTIL) (2.5 MG/3ML) 0.083% NEBULIZER SOLUTION    Take 2.5 mg by nebulization every 4 hours as needed for Wheezing or Shortness of Breath    ALBUTEROL SULFATE HFA (PROVENTIL;VENTOLIN;PROAIR) 108 (90 BASE) MCG/ACT INHALER    Inhale 2 puffs into the lungs every 6 hours as needed for Wheezing    BOSWELLIA-GLUCOSAMINE-VIT D (OSTEO BI-FLEX ONE PER DAY) TABS    Take 1 tablet by mouth daily Indications: Nutritional Support Triple strength    CARBOXYMETHYLCELLULOSE SODIUM (EYE DROPS OP)    Place 1 drop into both eyes as needed (Dry eyes) Indications: Systane    CARVEDILOL (COREG) 6.25 MG TABLET    TAKE 1 TABLET TWICE A DAY    CITALOPRAM (CELEXA) 20 MG TABLET    Take 20 mg by mouth daily Indications: Depression    DIGOXIN (LANOXIN) 125 MCG TABLET    Take 1 tablet by mouth daily    FERROUS SULFATE (IRON 325) 325 (65 FE) MG TABLET    Take 1 tablet by mouth 2 times daily (with meals)    FUROSEMIDE (LASIX) 20 MG TABLET    TAKE 1 TABLET DAILY    LACTOBACILLUS TABS    Take 2 mg by mouth daily Indications: Gastroesophageal Reflux Disease 500 million CFU daily    MECLIZINE (ANTIVERT) 25 MG TABLET    Take 25 mg by mouth every 6 hours as needed for Dizziness or Nausea    MULTIPLE VITAMINS-MINERALS (CENTRUM SILVER ULTRA WOMENS) TABS    Take 1 tablet by mouth daily Indications: Nutritional Support    NITROGLYCERIN (NITROSTAT) 0.4 MG SL TABLET    up to max of 3 total doses. If no relief after 1 dose, call 911.     PANTOPRAZOLE (PROTONIX) 40 MG TABLET    TAKE 1 TABLET DAILY    POTASSIUM CHLORIDE (KLOR-CON M) 20 MEQ EXTENDED RELEASE TABLET    Take 20 mEq by mouth 2 times daily Indications: Nutritional Support, furosemide therapy    RIVAROXABAN (XARELTO) 15 MG TABS TABLET    Take 1 tablet by mouth daily    SACUBITRIL-VALSARTAN (ENTRESTO) 24-26 MG PER TABLET    Take 1 tablet by mouth 2 times daily    SYNTHROID 88 MCG TABLET    Take 88 mcg by mouth every morning (before breakfast) Indications: Underactive Thyroid    VITAMIN B-12 (CYANOCOBALAMIN) 1000 MCG TABLET    Take 1,000 mcg by mouth daily Indications: Nutritional Support    VITAMIN D (CHOLECALCIFEROL) 50 MCG (2000 UT) TABS TABLET    Take 1 tablet by mouth Daily with supper       ALLERGIES     Latex and Tape Elta Michael tape]    FAMILY HISTORY       Family History   Problem Relation Age of Onset    Arthritis Mother     Arthritis Father     High Blood Pressure Father     Colon Cancer Brother           SOCIAL HISTORY       Social History     Socioeconomic History    Marital status:      Spouse name: None    Number of children: 2    Years of education: None    Highest education level: None   Tobacco Use    Smoking status: Never    Smokeless tobacco: Never   Vaping Use    Vaping Use: Never used   Substance and Sexual Activity    Alcohol use: No     Alcohol/week: 0.0 standard drinks    Drug use: No    Sexual activity: Not Currently   Social History Narrative    , Lives With: Alone, son lives down the street, dtr is in the area    Type of Home: [facility]  in [address]: One level    Home Access: Stairs to enter with rails- Number of Steps: 2- Rails: Both    Bathroom Shower/Tub: Tub/Shower unit, Bathroom Equipment: Grab bars in shower, Shower chair    Home Equipment: Rolling walker, Cane(Pt infrequemtly uses DME for ambulation and prefers to furniture walk in home)    ADL Assistance: Independent, [address] [place] [address] Road: [place] [address] Responsibilities: Yes    Ambulation Assistance: Independent, Transfer Assistance: Independent    Additional Comments: Son stops over 2 times daily           SCREENINGS        Cody Coma Scale  Eye Opening: Spontaneous  Best Verbal Response: Oriented  Best Motor Response: Obeys commands  Cody Coma Scale Score: 15               PHYSICAL EXAM    (up to 7 for level 4, 8 or more for level 5)     ED Triage Vitals [03/10/23 1401]   BP Temp Temp Source Heart Rate Resp SpO2 Height Weight   97/71 98.2 °F (36.8 °C) Oral 85 18 95 % 5' (1.524 m) 150 lb (68 kg)       Physical Exam  Constitutional:       General: She is not in acute distress. Appearance: She is well-developed. She is ill-appearing. She is not diaphoretic. HENT:      Head: Normocephalic and atraumatic.       Right Ear: External ear normal.      Left Ear: External ear normal.      Nose: Nose normal.      Mouth/Throat:      Mouth: Mucous membranes are moist.      Pharynx: No oropharyngeal exudate. Eyes:      Extraocular Movements: Extraocular movements intact. Conjunctiva/sclera: Conjunctivae normal.      Pupils: Pupils are equal, round, and reactive to light. Neck:      Thyroid: No thyromegaly. Vascular: No JVD. Trachea: No tracheal deviation. Cardiovascular:      Rate and Rhythm: Normal rate and regular rhythm. Heart sounds: Normal heart sounds. No murmur heard. Pulmonary:      Effort: Pulmonary effort is normal. No respiratory distress. Breath sounds: Normal breath sounds. No wheezing. Abdominal:      General: Bowel sounds are normal.      Palpations: Abdomen is soft. Tenderness: There is no abdominal tenderness. There is no guarding. Musculoskeletal:         General: Normal range of motion. Cervical back: Normal range of motion and neck supple. Right lower leg: No edema. Left lower leg: No edema. Skin:     General: Skin is warm and dry. Coloration: Skin is pale. Findings: No rash. Neurological:      Mental Status: She is alert and oriented to person, place, and time. Cranial Nerves: No cranial nerve deficit. Motor: Weakness present.       Comments: Unable to lift either leg off the bed she is generally severely weak   Psychiatric:         Behavior: Behavior normal.       DIAGNOSTIC RESULTS     EKG: All EKG's are interpreted by the Emergency Department Physician who either signs or Co-signs this chart in the absence of a cardiologist.    This rhythm with PACs 91 bpm right bundle branch block left anterior fascicular block    RADIOLOGY:   Non-plain film images such as CT, Ultrasound and MRI are read by the radiologist. Plain radiographic images are visualized and preliminarily interpreted by the emergency physician with the below findings:        Interpretation per the Radiologist below, if available at the time of this note:    XR CHEST PORTABLE   Final Result No acute pulmonary process. Cardiomegaly and ectatic thoracic aorta. CT Head W/O Contrast    (Results Pending)         ED BEDSIDE ULTRASOUND:   Performed by ED Physician - none    LABS:  Labs Reviewed   TROPONIN - Abnormal; Notable for the following components:       Result Value    Troponin 0.013 (*)     All other components within normal limits    Narrative:     CALL  Dial  [facility] tel. [phone],  Troponin results called to and read back by Dr. Carlito Owens, 03/10/2023 15:33, by  Marco Antonio Herrera - Abnormal; Notable for the following components:    Protime 35.4 (*)     All other components within normal limits   COMPREHENSIVE METABOLIC PANEL - Abnormal; Notable for the following components:    Anion Gap 7 (*)     Glucose 120 (*)     Calcium 11.0 (*)     Albumin 2.8 (*)     Globulin 3.8 (*)     All other components within normal limits   CBC WITH AUTO DIFFERENTIAL - Abnormal; Notable for the following components:    WBC 12.3 (*)     RBC 3.27 (*)     Hemoglobin 9.3 (*)     Hematocrit 27.9 (*)     RDW 16.3 (*)     Neutrophils Absolute 10.3 (*)     Lymphocytes Absolute 0.9 (*)     Monocytes Absolute 0.9 (*)     All other components within normal limits   URINALYSIS - Abnormal; Notable for the following components:    Ketones, Urine TRACE (*)     Protein, UA 30 (*)     All other components within normal limits   LACTIC ACID   MICROSCOPIC URINALYSIS   TYPE AND SCREEN       All other labs were within normal range or not returned as of this dictation. EMERGENCY DEPARTMENT COURSE and DIFFERENTIAL DIAGNOSIS/MDM:   Vitals:    Vitals:    03/10/23 1401 03/10/23 1622   BP: 97/71 117/87   Pulse: 85 79   Resp: 18 16   Temp: 98.2 °F (36.8 °C)    TempSrc: Oral    SpO2: 95% 95%   Weight: 150 lb (68 kg)    Height: 5' (1.524 m)        Brought in for general weakness. Patient has known colorectal cancer but is not a surgical candidate. Hemoglobin is 9.3 and vitals are stable.   Patient's blood pressure was a little low on arrival but improved with some fluids. Patient cannot lift her legs off the bed secondary to weakness. There is no back pain and no recent falls. Son does not want to make her hospice at this time and would like her to go to [address] [place] [address] for rehab. She was there for a month in January and did well. Patient will have to be admitted to the hospital for precertification to get to [facility]. Medical Decision Making  Amount and/or Complexity of Data Reviewed  Labs: ordered. Radiology: ordered. ECG/medicine tests: ordered. Risk  Prescription drug management. Decision regarding hospitalization. REASSESSMENT          CRITICAL CARE TIME   Total Critical Care time was 0 minutes, excluding separately reportable procedures. There was a high probability of clinically significant/life threatening deterioration in the patient's condition which required my urgent intervention. CONSULTS:  None    PROCEDURES:  Unless otherwise noted below, none     Procedures      FINAL IMPRESSION      1. Generalized weakness    2. Gait disturbance          DISPOSITION/PLAN   DISPOSITION Decision To Admit 03/10/2023 05:32:47 PM      PATIENT REFERRED TO:  No follow-up provider specified. DISCHARGE MEDICATIONS:  New Prescriptions    No medications on file     Controlled Substances Monitoring:     RX Monitoring 4/22/2016   Attestation The Prescription Monitoring Report for this patient was reviewed today. Periodic Controlled Substance Monitoring Possible medication side effects, risk of tolerance and/or dependence, and alternative treatments discussed; No signs of potential drug abuse or diversion identified. ;Medication contract signed today. ;Random urine drug screen sent today.        (Please note that portions of this note were completed with a voice recognition program.  Efforts were made to edit the dictations but occasionally words are mis-transcribed.)    Raquel Nunn, DO (electronically signed)  Attending Emergency Physician            Tiffanie Knows, DO  03/13/23 1922

## 2023-03-10 NOTE — TELEPHONE ENCOUNTER
Patient is requesting refill she is all out.       Rx request   Requested Prescriptions     Pending Prescriptions Disp Refills    rivaroxaban (XARELTO) 15 MG TABS tablet 90 tablet 3     Sig: Take 1 tablet by mouth daily     LOV 12/13/2022  Next Visit Date:  Future Appointments   Date Time Provider Felecia Corrales   3/17/2023 10:00 AM Sunny Essex, MD [address]   6/20/2023 11:00 AM Jorge Goldberg, DO Maliha Simon

## 2023-03-10 NOTE — CARE COORDINATION
I called [facility] and spoke with Dolores Espinal RN [facility] and she states they saw pt for the first time 3/6 and pt highly recommended Hospice. Pt did not want to come to the hospital anymore and hospice was recommended, family declined \"they were not ready\" \"and her care is out of the scope of palliative care. \" I called son Mamta Saul and discussed this and he wants her to be able to return home safely, family can help somewhat. He feels therapy at [facility] will help stating he cannot take her home like this nor does he want hospice at this time.  Electronically signed by Angela Zuniga RN on 3/10/2023 at 4:58 PM

## 2023-03-10 NOTE — CARE COORDINATION
03/10/23    From:FROM HOME W SON INDEPENDENT UP UNTIL 2 WEEKS AGO WALKS W WALKER, SPEAKS Gren[place], SON SPEAKS ENGLISH    Admit:     PMH:COLON CA, CHF, CHARCOT MUSCULAR DYSTROPHY, AAA, HTN, THORACENTESIS, A-FIB Rell Dove)    Anticipated Discharge Disposition:SNF    Patient Mobility or PT/OT ordered:NEED ORDER    Consults:     Clinical: TROP 0.013    Barriers to Discharge:  NEED ADM ORDERS  INR 3.5    Assessments: CMI DONE

## 2023-03-10 NOTE — ED NOTES
Report called to 2W. No tele ordered. Patient updated. Transportation notified.      Naomy Felder RN  03/10/23 [id number]

## 2023-03-11 LAB
ANION GAP SERPL CALCULATED.3IONS-SCNC: 9 MEQ/L (ref 9–15)
BASOPHILS ABSOLUTE: 0.1 K/UL (ref 0–0.2)
BASOPHILS RELATIVE PERCENT: 0.6 %
BUN BLDV-MCNC: 16 MG/DL (ref 8–23)
CALCIUM SERPL-MCNC: 10.1 MG/DL (ref 8.5–9.9)
CHLORIDE BLD-SCNC: 106 MEQ/L (ref 95–107)
CO2: 21 MEQ/L (ref 20–31)
CREAT SERPL-MCNC: 0.61 MG/DL (ref 0.5–0.9)
EOSINOPHILS ABSOLUTE: 0.2 K/UL (ref 0–0.7)
EOSINOPHILS RELATIVE PERCENT: 1.6 %
GFR SERPL CREATININE-BSD FRML MDRD: >60 ML/MIN/{1.73_M2}
GLUCOSE BLD-MCNC: 89 MG/DL (ref 70–99)
HCT VFR BLD CALC: 25.2 % (ref 37–47)
HEMOGLOBIN: 8.4 G/DL (ref 12–16)
LYMPHOCYTES ABSOLUTE: 1.1 K/UL (ref 1–4.8)
LYMPHOCYTES RELATIVE PERCENT: 9.3 %
MCH RBC QN AUTO: 28 PG (ref 27–31.3)
MCHC RBC AUTO-ENTMCNC: 33.2 % (ref 33–37)
MCV RBC AUTO: 84.1 FL (ref 79.4–94.8)
MONOCYTES ABSOLUTE: 1 K/UL (ref 0.2–0.8)
MONOCYTES RELATIVE PERCENT: 8.4 %
NEUTROPHILS ABSOLUTE: 9.3 K/UL (ref 1.4–6.5)
NEUTROPHILS RELATIVE PERCENT: 80.1 %
PDW BLD-RTO: 16.4 % (ref 11.5–14.5)
PLATELET # BLD: 290 K/UL (ref 130–400)
POTASSIUM REFLEX MAGNESIUM: 4.1 MEQ/L (ref 3.4–4.9)
RBC # BLD: 3 M/UL (ref 4.2–5.4)
SODIUM BLD-SCNC: 136 MEQ/L (ref 135–144)
WBC # BLD: 11.6 K/UL (ref 4.8–10.8)

## 2023-03-11 PROCEDURE — G0378 HOSPITAL OBSERVATION PER HR: HCPCS

## 2023-03-11 PROCEDURE — 80048 BASIC METABOLIC PNL TOTAL CA: CPT

## 2023-03-11 PROCEDURE — 97163 PT EVAL HIGH COMPLEX 45 MIN: CPT

## 2023-03-11 PROCEDURE — 85025 COMPLETE CBC W/AUTO DIFF WBC: CPT

## 2023-03-11 PROCEDURE — 2700000000 HC OXYGEN THERAPY PER DAY

## 2023-03-11 PROCEDURE — 6370000000 HC RX 637 (ALT 250 FOR IP): Performed by: INTERNAL MEDICINE

## 2023-03-11 PROCEDURE — 97167 OT EVAL HIGH COMPLEX 60 MIN: CPT

## 2023-03-11 PROCEDURE — 36415 COLL VENOUS BLD VENIPUNCTURE: CPT

## 2023-03-11 PROCEDURE — 2580000003 HC RX 258: Performed by: INTERNAL MEDICINE

## 2023-03-11 RX ORDER — SUCRALFATE 1 G/1
1 TABLET ORAL 4 TIMES DAILY
COMMUNITY

## 2023-03-11 RX ADMIN — Medication 10 ML: at 19:54

## 2023-03-11 RX ADMIN — RIVAROXABAN 15 MG: 15 TABLET, FILM COATED ORAL at 17:25

## 2023-03-11 RX ADMIN — ACETAMINOPHEN 650 MG: 325 TABLET ORAL at 19:53

## 2023-03-11 RX ADMIN — Medication 10 ML: at 09:27

## 2023-03-11 ASSESSMENT — PAIN DESCRIPTION - ORIENTATION
ORIENTATION: RIGHT
ORIENTATION: RIGHT;LEFT

## 2023-03-11 ASSESSMENT — PAIN DESCRIPTION - DESCRIPTORS: DESCRIPTORS: ACHING;SORE

## 2023-03-11 ASSESSMENT — PAIN DESCRIPTION - LOCATION
LOCATION: ANKLE
LOCATION: ABDOMEN

## 2023-03-11 ASSESSMENT — PAIN SCALES - GENERAL
PAINLEVEL_OUTOF10: 4
PAINLEVEL_OUTOF10: 3
PAINLEVEL_OUTOF10: 4

## 2023-03-11 NOTE — PROGRESS NOTES
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 9) with comorbidity ([facility] 2023    Encounter for hospice care discussion 2023    Pneumonia of left lower lobe due to infectious organism 2023    Gram-negative bacteremia 2023    H/O abdominal abscess 2023    Generalized weakness 2023    Weakness 2023    Adenomatous polyp of descending colon 2023    Adenomatous polyp of colon 2023    Impaired mobility and ADLs 2022    Palliative care encounter 2022    Advanced care planning/counseling discussion 2022    Goals of care, counseling/discussion     Acute diastolic HF (heart failure) ([facility] 2022    CHF (congestive heart failure), NYHA class I, acute on chronic, combined ([facility] 2022    Difficulty in walking 2022    Muscle weakness (generalized) 2022    Gastro-esophageal reflux disease without esophagitis 2022    Intracardiac thrombosis, 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    Change or removal of drains 2022    Adnexal mass 2022    Intra-abdominal abscess ([facility] 2022    Aortic thrombus ([facility] 2022    Hx of drainage of abscess 2022    Abdominal pain 2022    Streptococcus viridans infection 2022    Colonic mass 2022    Right lower quadrant abdominal pain 2022    Hyponatremia 2022    Hypokalemia 2022    Anemia 2022    CHF (congestive heart failure) ([facility] 07/18/2022    Hypothyroid 07/18/2022    Central retinal vein occlusion, left eye, stable 04/12/2021    Lumbar stenosis with neurogenic claudication 06/02/2016    Chronic diastolic CHF (congestive heart failure) ([facility]) 04/01/2022    Acute cystitis with hematuria 02/21/2021    Acute on chronic combined systolic (congestive) and diastolic (congestive) heart failure ([facility]) 02/09/2021    Pleural effusion 02/08/2021    Hypoxia     Acute pulmonary edema ([facility])     Gait abnormality 01/27/2021    Acute on chronic combined systolic and diastolic CHF (congestive heart failure) ([facility]) 01/25/2021    Essential hypertension 02/16/2018    Shortness of breath     Vertigo     Thoracic aortic aneurysm without rupture 06/23/2017    Descending aortic aneurysm ([facility]) 06/23/2017    Osteoarthritis     Myelopathy ([facility])     Headache     Depression     Acquired scoliosis 06/02/2016    Osteoporosis 06/02/2016    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    Excess weight 06/02/2016    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/02/2016        Past Medical History:   Diagnosis Date    Ascending aortic aneurysm 06/23/2017    Charcot Arleen Tooth muscular atrophy     CHF (congestive heart failure) ([facility])     Chronic diastolic CHF (congestive heart failure) ([facility]) 04/01/2022    Depression     Descending aortic aneurysm ([facility]) 06/23/2017    Essential hypertension 02/16/2018    Headache     HTN (hypertension)     Impaired mobility and activities of daily living     Lumbar stenosis with neurogenic claudication     Myelopathy ([facility])     Osteoarthritis     PAF (paroxysmal atrial fibrillation) ([facility])     XARELTO     Past Surgical History:   Procedure Laterality Date    COLONOSCOPY N/A 1/4/2023    COLONOSCOPY DIAGNOSTIC performed by Grant Raya MD at [facility]    IR INS PICC VAD W SQ PORT GREATER THAN 5  9/23/2022    IR INS PICC VAD W SQ PORT GREATER THAN 5 9/23/2022 [facility]    JOINT REPLACEMENT Bilateral     knees    OTHER  SURGICAL HISTORY Right 07/21/2022    cat scan guided abdominal mass aspiration with drain placement by Dr. Ailyn Butler Left 02/25/2021    LEFT PLEURAL CATHETER INSERTION performed by Anam Sandy MD at [place] [id number] Left 01/29/2021    total of 755 cc removed per Dr Yoselin Blancas specimen sent to lab        Restrictions  Restrictions/Precautions: Fall Risk              Safety Devices: Safety Devices  Type of Devices: All fall risk precautions in place     Patient's date of birth confirmed: Yes    General:  Chart Reviewed: Yes  Patient assessed for rehabilitation services?: Yes    Subjective          Pain at start of treatment: pt did not quantify     Pain at end of treatment: pt did not quantify     Location: B feet and legs  Description: not stated   Nursing notified: no, son stated RN aware   Intervention: None    Prior Level of Function:  Social/Functional History  Lives With: Alone  Type of Home: House  Home Layout: One level, Able to Live on Main level with bedroom/bathroom  Home Access: Stairs to enter with rails  Entrance Stairs - Number of Steps: 2  Bathroom Shower/Tub: Walk-in shower  Bathroom Toilet: Standard  Bathroom Equipment: Grab bars in shower, Shower chair, Hand-held shower  Home Equipment: Nyoka Feil, rolling, Oxygen  Receives Help From: Family  ADL Assistance: Independent  Homemaking Responsibilities: No  Ambulation Assistance: Independent ([address])  Transfer Assistance: Independent  Active : No  Patient's  Info: Son  Additional Comments: Social Functional was given by Benita Brumfield due to language and hearing barriers. Patient's son lives a few houses away and visits 3x a day. will complete housework and meal prep.     OBJECTIVE:     Orientation Status:  Orientation  Orientation Level: Oriented to place;Oriented to person;Disoriented to time;Disoriented to situation    Observation:  Observation/Palpation  Observation: supine in bed, oxygen, agreeable to OT evaluation    Cognition Status:  Cognition  Cognition Comment: pt with difficulty hearing and has language barrier, son reported pt is becoming forgetful    Perception Status:       Vision and Hearing Status:  Vision  Vision Exceptions:  (has glasses, but does not wear per son report)  Hearing  Hearing: Exceptions to [facility]  Hearing Exceptions: Hard of hearing/hearing concerns; No hearing aid        GROSS ASSESSMENT AROM/PROM:  AROM: Generally decreased, functional (shoulder flexion to ~80-90° BUE's, WFL elbow to hand)       ROM:   LUE AROM (degrees)  LUE General AROM: shoulder flexion to ~80-90° BUE's, WFL elbow to hand  Left Hand AROM (degrees)  Left Hand AROM: WFL  Left Hand General AROM: arthritic changes noted  RUE AROM (degrees)  RUE General AROM: shoulder flexion to ~80-90° BUE's, WFL elbow to hand  Right Hand AROM (degrees)  Right Hand AROM: WFL  Right Hand General AROM: arthritic changes noted    UE STRENGTH:  Strength: Generally decreased, functional (3-/5 BUE)    UE COORDINATION:  Coordination: Generally decreased, functional    UE TONE:  Tone: Normal    UE SENSATION:  Sensation:  (pt denied numbness or tingling)    Hand Dominance:  Hand Dominance  Hand Dominance: Right    ADL Status:  ADL  Feeding: Setup (pt able to hold cup of water and drink from straw)  Grooming: Moderate assistance  UE Bathing: Moderate assistance  LE Bathing: Dependent/Total  UE Dressing:  Moderate assistance  LE Dressing: Dependent/Total  Toileting: Dependent/Total  Additional Comments: ADL's simulated  Toilet Transfers  Toilet Transfer: Unable to assess    Functional Mobility:    Transfers  Sit to stand: Unable to assess  Stand to sit: Unable to assess  Transfer Comments: NT for safety    Patient ambulated: NT for safety     Bed Mobility  Bed mobility  Rolling to Left: Maximum assistance  Rolling to Right: Maximum assistance  Supine to Sit: Maximum assistance  Sit to Supine: Maximum assistance  Bed Mobility Comments: patient required x2 assist for all bed mobility. VC and TC given for appropriate hand placement    Seated and Standing Balance:  Balance  Sitting:  (SBA)  Standing:  (NT for safety)    Functional Endurance:  Activity Tolerance  Activity Tolerance: Treatment limited secondary to decreased cognition;Patient limited by pain    D/C Recommendations:  OT D/C RECOMMENDATIONS  REQUIRES OT FOLLOW-UP: Yes    Equipment Recommendations:  OT Equipment Recommendations  Other: continue to assess    OT Education:   Patient Education  Education Given To: Patient  Education Provided: Role of Therapy;Plan of Care  Education Method: Verbal  Barriers to Learning: Cognition; Hearing (langauge)  Education Outcome: Continued education needed    OT Follow Up:   OT D/C RECOMMENDATIONS  REQUIRES OT FOLLOW-UP: Yes       Assessment/Discharge Disposition:  Assessment: Pt is a 80 y.o. female with above mentioned deficits. Per son report, pt able to dress and bathe self at home IND'ly. Pt with above mentioned deficits impairing ability to complete ADL's at baseline. Pt may benefit from OT services to address deficits and maximize safety and function during ADL's.   Performance deficits / Impairments: Decreased functional mobility , Decreased ADL status, Decreased strength, Decreased cognition, Decreased endurance  Prognosis: Fair  Discharge Recommendations: Continue to assess pending progress  Decision Making: High Complexity  History: Multi comorb  Exam: 5 perf imp  Assistance / Modification: 5 perf imp    AMPAC (Six Click) Self care Score   How much help is needed for putting on and taking off regular lower body clothing?: Total  How much help is needed for bathing (which includes washing, rinsing, drying)?: A Lot  How much help is needed for toileting (which includes using toilet, bedpan, or urinal)?: Total  How much help is needed for putting on and taking off regular upper body clothing?: A Lot  How much help is needed for taking care of personal grooming?: A Lot  How much help for eating meals?: A Little  AM-[facility] Inpatient Daily Activity Raw Score: 11  AM-[facility] Inpatient ADL T-Scale Score : 29.04  ADL Inpatient CMS 0-100% Score: 70.42    Therapy key for assistance levels -   Independent/Mod I = Pt. is able to perform task with no assistance but may require a device   Stand by assistance = Pt. does not perform task at an independent level but does not need physical assistance, requires verbal cues  Minimal, Moderate, Maximal Assistance = Pt. requires physical assistance (25%, 50%, 75% assist from helper) for task but is able to actively participate in task   Dependent = Pt. requires total assistance with task and is not able to actively participate with task completion     Plan:  Occupational Therapy Plan  Times Per Week: 1-4  Therapy Duration:  (LOS)  Current Treatment Recommendations: Strengthening, Functional mobility training, Endurance training, Neuromuscular re-education, Safety education & training, Patient/Caregiver education & training, Equipment evaluation, education, & procurement, Self-Care / ADL (LOS)    Goals:   Patient will:    - Improve functional endurance to tolerate/complete 8-10 mins of ADL's  - Be Set up in UB ADLs   - Be Min A in LB ADLs  - Be Min A in ADL transfers without LOB  - Improve B UE strength and endurance to increase by 1/2 MMT grade in order to participate in self-care activities as projected. Patient Goal:    not stated        Therapy Time:   Individual   Time In 1142   Time Out 1208   Minutes 26          Eval: 26 minutes     Electronically signed by:     SERAFIN Daigle,   3/11/2023, 1:44 PM

## 2023-03-11 NOTE — PROGRESS NOTES
See OT evaluation for all goals and OT POC.  Electronically signed by SERAFIN Brown on 3/11/2023 at 1:46 PM

## 2023-03-11 NOTE — PROGRESS NOTES
Physical Therapy Missed Treatment   Facility/Department: [facility] MED SURG Q412/S363-11    NAME: Ashwin Arias    : 1932 (80 y.o.)  MRN: [id number]    Account: [de-identified]  Gender: female      Patient chart reviewed and assessed. Attempted PT evaluation at 0159 - [phone]. Due to language barriers, attempted to use  services through [address] [facility], patient and  unable to understand. PT called son, he stated he would come back to the hospital around [phone] to help interpret to complete evaluation. Nursing staff notified. Will follow and attempt PT evaluation again at earliest availability.        Tutu Singleton, PT, 23 at 11:13 AM

## 2023-03-11 NOTE — H&P
Hospital Medicine  History and Physical    Patient:  Darby Rodriguez  MRN: [id number]    CHIEF COMPLAINT:    Chief Complaint   Patient presents with    Fatigue     Per son, patient has had increased weakness x3 days. Patient has had episodes of nearly falling. Per son, he caught patient and lowered her to the ground both times. Denies head injury. Denies LOC. Denies injury. History Obtained From:  Patient, EMR  Primary Care Physician: Albino Knapp MD    HISTORY OF PRESENT ILLNESS:   49-year-old female presents with progressive weakness to the point where son had to help lower her to the ground for a controlled fall. She has known colorectal cancer for which she has declined surgical intervention. She has been having blood in her stool for quite some time. She is brought to the hospital because family cannot care for her and want her placed. Case management had extensive conversation with son regarding hospice but he is not interested at this time.     Past Medical History:      Diagnosis Date    Ascending aortic aneurysm 06/23/2017    Charcot Arleen Tooth muscular atrophy     CHF (congestive heart failure) (HCC)     Chronic diastolic CHF (congestive heart failure) ([address] 04/01/2022    Depression     Descending aortic aneurysm ([address] 06/23/2017    Essential hypertension 02/16/2018    Headache     HTN (hypertension)     Impaired mobility and activities of daily living     Lumbar stenosis with neurogenic claudication     Myelopathy (HCC)     Osteoarthritis     PAF (paroxysmal atrial fibrillation) ([address]     [facility]       Past Surgical History:      Procedure Laterality Date    COLONOSCOPY N/A 1/4/2023    COLONOSCOPY DIAGNOSTIC performed by Phil Armenta MD at [facility]    IR INS PICC VAD W SQ PORT GREATER THAN 5  9/23/2022    IR INS PICC VAD W SQ PORT GREATER THAN 5 9/23/2022 [facility] SPECIAL PROCEDURE    JOINT REPLACEMENT Bilateral     knees    OTHER SURGICAL HISTORY Right 07/21/2022    cat scan guided abdominal mass aspiration with drain placement by Dr. Noelle Pena Left 02/25/2021    LEFT PLEURAL CATHETER INSERTION performed by Melia Vital MD at [facility] Left 01/29/2021    total of 755 cc removed per Dr Trupti Le specimen sent to lab       Medications Prior to Admission:    Prior to Admission medications    Medication Sig Start Date End Date Taking?  Authorizing Provider   albuterol (PROVENTIL) (2.5 MG/3ML) 0.083% nebulizer solution Take 2.5 mg by nebulization every 4 hours as needed for Wheezing or Shortness of Breath    Historical Provider, MD   albuterol sulfate HFA (PROVENTIL;VENTOLIN;PROAIR) 108 (90 Base) MCG/ACT inhaler Inhale 2 puffs into the lungs every 6 hours as needed for Wheezing  Patient not taking: Reported on 1/11/2023 11/21/22   Amy Velarde MD   digoxin (LANOXIN) 125 MCG tablet Take 1 tablet by mouth daily  Patient taking differently: Take 125 mcg by mouth daily Indications: Atrial Fibrillation 11/22/22   Amy Velarde MD   sacubitril-valsartan (ENTRESTO) 24-26 MG per tablet Take 1 tablet by mouth 2 times daily  Patient taking differently: Take 1 tablet by mouth 2 times daily Indications: Congestive Heart Failure 11/22/22   Amy Velarde MD   Lactobacillus TABS Take 2 mg by mouth daily Indications: Gastroesophageal Reflux Disease 500 million CFU daily    Historical Provider, MD   rivaroxaban (XARELTO) 15 MG TABS tablet Take 1 tablet by mouth daily  Patient taking differently: Take 15 mg by mouth nightly Indications: Atrial Fibrillation 9/29/22   Mimi Rosas, APRN - CNP   meclizine (ANTIVERT) 25 MG tablet Take 25 mg by mouth every 6 hours as needed for Dizziness or Nausea 9/12/22   Historical Provider, MD   Vitamin D (CHOLECALCIFEROL) 50 MCG (2000 UT) TABS tablet Take 1 tablet by mouth Daily with supper  Patient taking differently: Take 2,000 Units by mouth Daily with supper Indications: Nutritional Support 8/18/22 Deb Oseguera, DO   potassium chloride (KLOR-CON M) 20 MEQ extended release tablet Take 20 mEq by mouth 2 times daily Indications: Nutritional Support, furosemide therapy    Historical Provider, MD   furosemide (LASIX) 20 MG tablet TAKE 1 TABLET DAILY  Patient taking differently: Take 20 mg by mouth daily Indications: Congestive Heart Failure 4/4/22   ADRIENNE Funes CNP   amLODIPine (NORVASC) 5 MG tablet Take 1 tablet by mouth daily 4/1/22 8/17/22  Jorge Franco,    pantoprazole (PROTONIX) 40 MG tablet TAKE 1 TABLET DAILY  Patient taking differently: Take 40 mg by mouth daily Indications: Gastroesophageal Reflux Disease 2/11/22   ADRIENNE Fermin CNP   carvedilol (COREG) 6.25 MG tablet TAKE 1 TABLET TWICE A DAY  Patient taking differently: Take 6.25 mg by mouth 2 times daily Indications: Congestive Heart Failure 1/17/22   ADRIENNE Fermin - CNP   nitroGLYCERIN (NITROSTAT) 0.4 MG SL tablet up to max of 3 total doses. If no relief after 1 dose, call 911. Patient taking differently: Place 0.4 mg under the tongue every 5 minutes as needed for Chest pain up to max of 3 total doses.  If no relief after 1 dose, call 911. 3/1/21   ADRIENNE Nicolas NP   ferrous sulfate (IRON 325) 325 (65 Fe) MG tablet Take 1 tablet by mouth 2 times daily (with meals)  Patient taking differently: Take 325 mg by mouth 2 times daily (with meals) Indications: Anemia 3/1/21   ADRIENNE Nicolas NP   Carboxymethylcellulose Sodium (EYE DROPS OP) Place 1 drop into both eyes as needed (Dry eyes) Indications: Systane    Historical Provider, MD   Boswellia-Glucosamine-Vit D (OSTEO BI-FLEX ONE PER DAY) TABS Take 1 tablet by mouth daily Indications: Nutritional Support Triple strength    Historical Provider, MD   Multiple Vitamins-Minerals (CENTRUM SILVER ULTRA WOMENS) TABS Take 1 tablet by mouth daily Indications: Nutritional Support    Historical Provider, MD   vitamin B-12 (CYANOCOBALAMIN) 1000 MCG tablet Take 1,000 mcg by mouth daily Indications: Nutritional Support    Historical Provider, MD   citalopram (CELEXA) 20 MG tablet Take 20 mg by mouth daily Indications: Depression 4/1/16   Historical Provider, MD   SYNTHROID 88 MCG tablet Take 88 mcg by mouth every morning (before breakfast) Indications: Underactive Thyroid 3/19/16   Historical Provider, MD       Allergies:  Latex and Tape [adhesive tape]    Social History:   TOBACCO:   reports that she has never smoked. She has never used smokeless tobacco.  ETOH:   reports no history of alcohol use. Family History:       Problem Relation Age of Onset    Arthritis Mother     Arthritis Father     High Blood Pressure Father     Colon Cancer Brother        REVIEW OF SYSTEMS:  Ten systems reviewed and negative except for stated in HPI    Physical Exam:    Vitals: /87   Pulse 79   Temp 98.2 °F (36.8 °C) (Oral)   Resp 16   Ht 5' (1.524 m)   Wt 150 lb (68 kg)   SpO2 95%   BMI 29.29 kg/m²   General appearance: Elderly and chronically ill-appearing but alert and following commands. Pale  Skin: Skin color, texture, turgor normal. No rashes or lesions  HEENT: eomi, perrla. MMM  Neck: No JVD or lymphadenopathy  Lungs: CTA bilaterally. No wheeze   Heart: RRR, no murmur or gallp  Abdomen: Palpable mass in right lower quadrant of abdomen which is tender to palpation. No guarding or rebound tenderness. Extremities: no edema, redness, or tenderness in calves.  Cap refill <2s  Neurologic: No focal deficits     Recent Labs     03/10/23  1430   WBC 12.3*   HGB 9.3*        Recent Labs     03/10/23  1430      K 4.7      CO2 25   BUN 17   CREATININE 0.72   GLUCOSE 120*   AST 30   ALT 11   BILITOT 0.5   ALKPHOS 47     Troponin T:   Recent Labs     03/10/23  1430   TROPONINI 0.013*       ABGs:   Lab Results   Component Value Date/Time    PHART 7.421 11/11/2022 12:32 PM    PO2ART 84 11/11/2022 12:32 PM    MIH9LZM 43 11/11/2022 12:32 PM     INR:   Recent Labs 03/10/23  1504   INR 3.5     URINALYSIS:  Recent Labs     03/10/23  1545   COLORU Yellow   PHUR 5.0   WBCUA 0-2   RBCUA 0-2   BACTERIA Negative   CLARITYU Clear   SPECGRAV 1.021   LEUKOCYTESUR Negative   UROBILINOGEN 0.2   BILIRUBINUR Negative   BLOODU Negative   GLUCOSEU Negative     -----------------------------------------------------------------   CT Head W/O Contrast    Result Date: 3/10/2023  EXAMINATION: CT OF THE HEAD WITHOUT CONTRAST  3/10/2023 5:50 pm TECHNIQUE: CT of the head was performed without the administration of intravenous contrast. Automated exposure control, iterative reconstruction, and/or weight based adjustment of the mA/kV was utilized to reduce the radiation dose to as low as reasonably achievable. COMPARISON: None. HISTORY: ORDERING SYSTEM PROVIDED HISTORY: weakness TECHNOLOGIST PROVIDED HISTORY: Reason for exam:->weakness Has a \"code stroke\" or \"stroke alert\" been called? ->No Decision Support Exception - unselect if not a suspected or confirmed emergency medical condition->Emergency Medical Condition (MA) What reading provider will be dictating this exam?->CRC FINDINGS: BRAIN/VENTRICLES: No mass effect, edema or hemorrhage is seen. Mild generalized cerebral volume loss is noted with mild-to-moderate chronic microvascular ischemic changes. No hydrocephalus or extra-axial fluid is seen. ORBITS: The visualized portion of the orbits demonstrate no acute abnormality. SINUSES:  Mucous retention cysts are seen in both maxillary sinuses. The remainder of the visualized paranasal sinuses are clear. Large left mastoid effusion. SOFT TISSUES/SKULL:  No acute abnormality of the visualized skull or soft tissues. 1.  No acute intracranial abnormality. 2. Large left mastoid effusion, which may be due to eustachian tube dysfunction or otomastoiditis. XR CHEST PORTABLE    Result Date: 3/10/2023  EXAMINATION: ONE XRAY VIEW OF THE CHEST 3/10/2023 4:46 pm COMPARISON: None.  HISTORY: ORDERING SYSTEM PROVIDED HISTORY: sob TECHNOLOGIST PROVIDED HISTORY: Reason for exam:->sob What reading provider will be dictating this exam?->CRC FINDINGS: Heart is enlarged. No airspace consolidation. No pneumothorax or pleural effusion. Focal scarring left lung base. No acute pulmonary process. Cardiomegaly and ectatic thoracic aorta. Assessment and Plan     1. Adult failure to thrive in setting of complex right lower quadrant mass suspicious for colorectal cancer for which family has declined further work-up  -Palliative care to follow. Patient is hospice appropriate. Son declined hospice as of 3/10. I will have repeat conversation with family this weekend  -PT/OT. Case management to assist with placement    History of GNR bacteremia related to RLQ mass  Hypertension  Chronic combined heart failure    55 minutes in total care time.      Susanna Alegria DO  Admitting Hospitalist

## 2023-03-11 NOTE — PROGRESS NOTES
Physical Therapy Med Surg Initial Assessment  Facility/Department: [facility]  Room: [facility]/J410-21       NAME: Najma Rodriguez  : 1932 (80 y.o.)  MRN: [id number]  CODE STATUS: Limited    Date of Service: 3/11/2023    Patient Diagnosis(es): Adult failure to thrive [R62.7]  Gait disturbance [R26.9]  Generalized weakness [R53.1]   Chief Complaint   Patient presents with    Fatigue     Per son, patient has had increased weakness x3 days. Patient has had episodes of nearly falling. Per son, he caught patient and lowered her to the ground both times. Denies head injury. Denies LOC. Denies injury. 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 9) with comorbidity ([address] 2023    Encounter for hospice care discussion 2023    Pneumonia of left lower lobe due to infectious organism 2023    Gram-negative bacteremia 2023    H/O abdominal abscess 2023    Generalized weakness 2023    Weakness 2023    Adenomatous polyp of descending colon 2023    Adenomatous polyp of colon 2023    Impaired mobility and ADLs 2022    Palliative care encounter 2022    Advanced care planning/counseling discussion 2022    Goals of care, counseling/discussion     Acute diastolic HF (heart failure) ([address] 2022    CHF (congestive heart failure), NYHA class I, acute on chronic, combined ([address] 2022    Difficulty in walking 2022    Muscle weakness (generalized) 2022    Gastro-esophageal reflux disease without esophagitis 2022    Intracardiac thrombosis, 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    Change or removal of drains 2022    Adnexal mass 2022    Intra-abdominal abscess ([address] 2022    Aortic thrombus ([address] 2022    Hx of drainage of abscess 07/27/2022    Abdominal pain 07/27/2022    Streptococcus viridans infection 07/27/2022    Colonic mass 07/19/2022    Right lower quadrant abdominal pain 07/18/2022    Hyponatremia 07/18/2022    Hypokalemia 07/18/2022    Anemia 07/18/2022    CHF (congestive heart failure) ([address] 07/18/2022    Hypothyroid 07/18/2022    Central retinal vein occlusion, left eye, stable 04/12/2021    Lumbar stenosis with neurogenic claudication 06/02/2016    Chronic diastolic CHF (congestive heart failure) ([address] 04/01/2022    Acute cystitis with hematuria 02/21/2021    Acute on chronic combined systolic (congestive) and diastolic (congestive) heart failure ([address] 02/09/2021    Pleural effusion 02/08/2021    Hypoxia     Acute pulmonary edema (HCC)     Gait abnormality 01/27/2021    Acute on chronic combined systolic and diastolic CHF (congestive heart failure) ([address] 01/25/2021    Essential hypertension 02/16/2018    Shortness of breath     Vertigo     Thoracic aortic aneurysm without rupture 06/23/2017    Descending aortic aneurysm ([address] 06/23/2017    Osteoarthritis     Myelopathy ([address]     Headache     Depression     Acquired scoliosis 06/02/2016    Osteoporosis 06/02/2016    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    Excess weight 06/02/2016    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/02/2016        Past Medical History:   Diagnosis Date    Ascending aortic aneurysm 06/23/2017    Charcot Arleen Tooth muscular atrophy     CHF (congestive heart failure) (HCC)     Chronic diastolic CHF (congestive heart failure) ([address] 04/01/2022    Depression     Descending aortic aneurysm ([address] 06/23/2017    Essential hypertension 02/16/2018    Headache     HTN (hypertension)     Impaired mobility and activities of daily living     Lumbar stenosis with neurogenic claudication     Myelopathy (HCC)     Osteoarthritis     PAF (paroxysmal atrial fibrillation) ([address]     [facility]     Past Surgical History:   Procedure Laterality Date    COLONOSCOPY N/A 1/4/2023    COLONOSCOPY DIAGNOSTIC performed by Amira Wills MD at [facility]    IR INS PICC VAD W SQ PORT GREATER THAN 5  9/23/2022    IR INS PICC VAD W SQ PORT GREATER THAN 5 9/23/2022 [facility] SPECIAL PROCEDURE    JOINT REPLACEMENT Bilateral     knees    OTHER SURGICAL HISTORY Right 07/21/2022    cat scan guided abdominal mass aspiration with drain placement by Dr. Lex Cortes Left 02/25/2021    LEFT PLEURAL CATHETER INSERTION performed by Kelley Silvestre MD at [address] [place] [address] Left 01/29/2021    total of 755 cc removed per Dr Ez Jernigan specimen sent to lab       Chart Reviewed: Yes  Patient assessed for rehabilitation services?: Yes  Family / Caregiver Present: Yes (Son for interpretation)  General Comment  Comments: Nurse finishing in room    Restrictions:  Restrictions/Precautions: Fall Risk     SUBJECTIVE:   Subjective: agreeable to tx    Pain  Patient unable to give pain rating, however reports increased pain in [place] LE and feet    Prior Level of Function:  Social/Functional History  Lives With: Alone  Type of Home: House  Home Layout: One level, Able to Live on Main level with bedroom/bathroom  Home Access: Stairs to enter with rails  Entrance Stairs - Number of Steps: 2  Bathroom Shower/Tub: Walk-in shower  Bathroom Equipment: Grab bars in shower, Shower chair, Hand-held shower  Home Equipment: [address] Help From: Family  ADL Assistance: Independent  Homemaking Responsibilities: No  Ambulation Assistance: Independent ([address])  Transfer Assistance: Independent  Active : No  Patient's  Info: Son  Additional Comments: Social Functional was given by Anibal Feliz due to language and hearing barriers. Patient's son lives a few houses away and visits 3x a day. will complete housework and meal prep.     OBJECTIVE:   Vision  Vision: Impaired  Vision Exceptions: Wears glasses at all times  Hearing: Exceptions to WFL  Hearing Exceptions: Hard of hearing/hearing concerns; No hearing aid    Cognition:  Overall Orientation Status: Impaired  Orientation Level: Oriented to place, Oriented to person, Disoriented to time    Observation/Palpation  Observation: supine in bed, oxygen, agreeable to PT evaluation    ROM:  RLE General AROM: decreased and limited  LLE General AROM: decreased and limited  AROM: Generally decreased, functional    Strength:  Strength RLE  Comment: grossly 3-/5 ; unable to complete SLR  Strength LLE  Comment: grossly 3-/5 ; unable to complete SLR  Strength Other  Other: poor trunk / core strength as demo'd by max assist for transfers  Strength: Generally decreased, functional    Bed mobility  Rolling to Left: Maximum assistance  Rolling to Right: Maximum assistance  Supine to Sit: Maximum assistance  Sit to Supine: Maximum assistance  Bed Mobility Comments: patient required x2 assist for all bed mobility. VC and TC given for appropriate hand placement    Transfers  Comment: patient only able to sit EOB this date. Ambulation  Comments: patient declined to ambulated    Activity Tolerance  Activity Tolerance: Patient tolerated evaluation without incident;Patient limited by pain; Other (comment) (evaluation limited by hearing concerns and language barrier)    Patient Education  Education Given To: Patient; Family  Education Provided: Role of Therapy;Plan of Care  Education Method: Verbal;  Barriers to Learning: Hearing; Other (Comment) (language)  Education Outcome: Unable to demonstrate understanding;Verbalized understanding (patient's son verbalized understanding)     ASSESSMENT:   Body Structures, Functions, Activity Limitations Requiring Skilled Therapeutic Intervention: Decreased functional mobility ; Decreased ROM; Decreased body mechanics; Decreased strength;Decreased balance; Increased pain;Decreased posture  Decision Making: High Complexity  History: high  Exam: high  Clinical Presentation: high    Treatment Diagnosis: impaired mobility, weakness  Therapy Prognosis: Fair    DISCHARGE RECOMMENDATIONS:  Discharge Recommendations: Continue to assess pending progress, Patient would benefit from continued therapy after discharge, Therapy recommended at discharge    Assessment: Patient is a 79 yo female presenting to acute hospital for weakness of LEs. patient demonstrated maxA +2 for bed mobility and transfers. She presented with strength and ROM deficits in B LE. Patient was able to sit EOB with increased encouragement and time. Patient was limited in PT evaluation secondary to language barrier, hearing concerns and pain. patient's son was present to help with interpretation and to inform therapist of social functional. Skilled PT indicated for improvements in overall mobility to allow for increased indep during ADLs and ambulation. Patient would be unsafe to return home and CLOF. Requires PT Follow-Up: Yes       PLAN OF CARE:  Physcial Therapy Plan  General Plan: 1 time a day 3-6 times a week  Current Treatment Recommendations: Balance training, ROM, Strengthening, Functional mobility training, Transfer training, Gait training, Stair training, Neuromuscular re-education, Manual, Pain management, Home exercise program, Safety education & training, Patient/Caregiver education & training, Modalities, Therapeutic activities    Safety Devices  Type of Devices:  All fall risk precautions in place, Bed alarm in place, Call light within reach, Left in bed, Patient at risk for falls    Goals:  Long Term Goals  Long Term Goal 1: patient will be able to complete bed mobility with Lorri  Long Term Goal 2: patient will be able to complete transfers with Lorri  Long Term Goal 3: patient will be able to ambulate >/=150' with LRD and supervision    [facility] (6 CLICK) [address] Mobility Raw Score : 11     Therapy Time:   Individual   Time In 1140   Time Out 1208   Minutes 28   28 min evaluation    Stephania Velasco LYNN Hawkins, 03/11/23 at 12:40 PM     Definitions for assistance levels  Independent = pt does not require any physical supervision or assistance from another person for activity completion. Device may be needed.   Stand by assistance = pt requires verbal cues or instructions from another person, close to but not touching, to perform the activity  Minimal assistance= pt performs 75% or more of the activity; assistance is required to complete the activity  Moderate assistance= pt performs 50% of the activity; assistance is required to complete the activity  Maximal assistance = pt performs 25% of the activity; assistance is required to complete the activity  Dependent = pt requires total physical assistance to accomplish the task

## 2023-03-11 NOTE — PROGRESS NOTES
[facility] Occupational Therapy      Date: 3/11/2023  Patient Name: Shasha Giron        MRN: [id number]  Account: [de-identified]   : 1932  (80 y.o.)  Room: [place] [address]    Chart reviewed, attempted OT at [phone] for evaluation. Patient not seen 2° to: Virtual  attempted but patient was unable to hear the  and the  was unable to understand the patient. Son was called and he plans to return to the hospital to assist with evaluation. Other: interpretation unsuccessful    Spoke to ADDIE Olivo RN aware. Will attempt again when able.     Electronically signed by Delvin Marcelo OT on 3/11/2023 at 11:16 AM

## 2023-03-11 NOTE — PROGRESS NOTES
Department of Internal Medicine  General Internal Medicine  Attending Progress Note      SUBJECTIVE:  Pt resting in bed    OBJECTIVE      Medications    Current Facility-Administered Medications: rivaroxaban (XARELTO) tablet 15 mg, 15 mg, Oral, Dinner  sodium chloride flush 0.9 % injection 5-40 mL, 5-40 mL, IntraVENous, 2 times per day  sodium chloride flush 0.9 % injection 5-40 mL, 5-40 mL, IntraVENous, PRN  0.9 % sodium chloride infusion, , IntraVENous, PRN  ondansetron (ZOFRAN-ODT) disintegrating tablet 4 mg, 4 mg, Oral, Q8H PRN **OR** ondansetron (ZOFRAN) injection 4 mg, 4 mg, IntraVENous, Q6H PRN  polyethylene glycol (GLYCOLAX) packet 17 g, 17 g, Oral, Daily PRN  acetaminophen (TYLENOL) tablet 650 mg, 650 mg, Oral, Q6H PRN **OR** acetaminophen (TYLENOL) suppository 650 mg, 650 mg, Rectal, Q6H PRN  Physical    VITALS:  /73   Pulse 80   Temp 97.2 °F (36.2 °C) (Oral)   Resp 20   Ht 5' (1.524 m)   Wt 150 lb (68 kg)   SpO2 92%   BMI 29.29 kg/m²   Constitutional: Lying in bed comfortably  Head: Normocephalic, atraumatic  ENT: moist mucous membranes  Neck: neck supple, trachea midline  Lungs: non labored  Heart: RRR  GI: firm, distended, RLQ tender with mass palpated  MSK: no edema noted  Skin: warm, dry  Data    CBC:   Lab Results   Component Value Date/Time    WBC 11.6 03/11/2023 05:44 AM    RBC 3.00 03/11/2023 05:44 AM    HGB 8.4 03/11/2023 05:44 AM    HCT 25.2 03/11/2023 05:44 AM    MCV 84.1 03/11/2023 05:44 AM    MCH 28.0 03/11/2023 05:44 AM    MCHC 33.2 03/11/2023 05:44 AM    RDW 16.4 03/11/2023 05:44 AM     03/11/2023 05:44 AM     CMP:    Lab Results   Component Value Date/Time     03/11/2023 05:44 AM    K 4.1 03/11/2023 05:44 AM     03/11/2023 05:44 AM    CO2 21 03/11/2023 05:44 AM    BUN 16 03/11/2023 05:44 AM    CREATININE 0.61 03/11/2023 05:44 AM    GFRAA >60.0 10/11/2022 10:30 AM    LABGLOM >60.0 03/11/2023 05:44 AM    GLUCOSE 89 03/11/2023 05:44 AM    PROT 6.6 03/10/2023 02:30 PM    LABALBU 2.8 03/10/2023 02:30 PM    CALCIUM 10.1 03/11/2023 05:44 AM    BILITOT 0.5 03/10/2023 02:30 PM    ALKPHOS 47 03/10/2023 02:30 PM    AST 30 03/10/2023 02:30 PM    ALT 11 03/10/2023 02:30 PM       ASSESSMENT AND PLAN      # Adult failure to thrive in setting of complex right lower quadrant mass suspicious for colorectal cancer for which family has declined further work-up  - Palliative care to follow. Patient is hospice appropriate. Son declined hospice as of 3/10. I will have repeat conversation with family this weekend  - PT/OT. Case management to assist with placement    History of GNR bacteremia related to RLQ mass  Hypertension  Chronic combined heart failure    Disposition: Awaiting placement. No acute medical needs.        Bay Sorensen DO  Internal Medicine   Hospitalist    >45 minutes in total care time

## 2023-03-12 PROCEDURE — 2700000000 HC OXYGEN THERAPY PER DAY

## 2023-03-12 PROCEDURE — 2580000003 HC RX 258: Performed by: INTERNAL MEDICINE

## 2023-03-12 PROCEDURE — 6370000000 HC RX 637 (ALT 250 FOR IP): Performed by: INTERNAL MEDICINE

## 2023-03-12 PROCEDURE — G0378 HOSPITAL OBSERVATION PER HR: HCPCS

## 2023-03-12 RX ADMIN — ACETAMINOPHEN 650 MG: 325 TABLET ORAL at 15:00

## 2023-03-12 RX ADMIN — Medication 10 ML: at 21:46

## 2023-03-12 RX ADMIN — RIVAROXABAN 15 MG: 15 TABLET, FILM COATED ORAL at 16:47

## 2023-03-12 RX ADMIN — Medication 10 ML: at 09:40

## 2023-03-12 ASSESSMENT — PAIN SCALES - GENERAL
PAINLEVEL_OUTOF10: 4
PAINLEVEL_OUTOF10: 4

## 2023-03-12 ASSESSMENT — PAIN DESCRIPTION - DESCRIPTORS
DESCRIPTORS: PRESSURE
DESCRIPTORS: PRESSURE

## 2023-03-12 ASSESSMENT — PAIN DESCRIPTION - LOCATION
LOCATION: ABDOMEN
LOCATION: ABDOMEN

## 2023-03-12 ASSESSMENT — PAIN DESCRIPTION - ORIENTATION: ORIENTATION: RIGHT

## 2023-03-12 NOTE — PLAN OF CARE
Problem: ABCDS Injury Assessment  Goal: Absence of physical injury  3/12/2023 0108 by Manuel Nicole RN  Outcome: Progressing  3/12/2023 0107 by Manuel Nicole RN  Outcome: Progressing     Problem: Skin/Tissue Integrity  Goal: Absence of new skin breakdown  Description: 1.  Monitor for areas of redness and/or skin breakdown  2.  Assess vascular access sites hourly  3.  Every 4-6 hours minimum:  Change oxygen saturation probe site  4.  Every 4-6 hours:  If on nasal continuous positive airway pressure, respiratory therapy assess nares and determine need for appliance change or resting period.  3/12/2023 0108 by Manuel Nicole RN  Outcome: Progressing  3/12/2023 0107 by Manuel Nicoel RN  Outcome: Progressing     Problem: Safety - Adult  Goal: Free from fall injury  3/12/2023 0108 by Manuel Nicole RN  Outcome: Progressing  3/12/2023 0107 by Manuel Nicole RN  Outcome: Progressing     Problem: Chronic Conditions and Co-morbidities  Goal: Patient's chronic conditions and co-morbidity symptoms are monitored and maintained or improved  3/12/2023 0108 by Manuel Nicole RN  Outcome: Progressing  3/12/2023 0107 by Manuel Nicole RN  Outcome: Progressing     Problem: Pain  Goal: Verbalizes/displays adequate comfort level or baseline comfort level  3/12/2023 0108 by Manuel Nicole RN  Outcome: Progressing  3/12/2023 0107 by Manuel Nicole RN  Outcome: Progressing

## 2023-03-12 NOTE — PROGRESS NOTES
Department of Internal Medicine  General Internal Medicine  Attending Progress Note      SUBJECTIVE:  Pt resting in bed. No acute issues.  Awaiting SNF    OBJECTIVE      Medications    Current Facility-Administered Medications: rivaroxaban (XARELTO) tablet 15 mg, 15 mg, Oral, Dinner  sodium chloride flush 0.9 % injection 5-40 mL, 5-40 mL, IntraVENous, 2 times per day  sodium chloride flush 0.9 % injection 5-40 mL, 5-40 mL, IntraVENous, PRN  0.9 % sodium chloride infusion, , IntraVENous, PRN  ondansetron (ZOFRAN-ODT) disintegrating tablet 4 mg, 4 mg, Oral, Q8H PRN **OR** ondansetron (ZOFRAN) injection 4 mg, 4 mg, IntraVENous, Q6H PRN  polyethylene glycol (GLYCOLAX) packet 17 g, 17 g, Oral, Daily PRN  acetaminophen (TYLENOL) tablet 650 mg, 650 mg, Oral, Q6H PRN **OR** acetaminophen (TYLENOL) suppository 650 mg, 650 mg, Rectal, Q6H PRN  Physical    VITALS:  BP (!) 127/90   Pulse 73   Temp 98 °F (36.7 °C) (Oral)   Resp 20   Ht 5' (1.524 m)   Wt 150 lb (68 kg)   SpO2 94%   BMI 29.29 kg/m²   Constitutional: Lying in bed comfortably  Head: Normocephalic, atraumatic  ENT: moist mucous membranes  Neck: neck supple, trachea midline  Lungs: non labored  Heart: RRR  GI: firm, distended, RLQ tender with mass palpated  MSK: no edema noted  Skin: warm, dry  Data    CBC:   Lab Results   Component Value Date/Time    WBC 11.6 03/11/2023 05:44 AM    RBC 3.00 03/11/2023 05:44 AM    HGB 8.4 03/11/2023 05:44 AM    HCT 25.2 03/11/2023 05:44 AM    MCV 84.1 03/11/2023 05:44 AM    MCH 28.0 03/11/2023 05:44 AM    MCHC 33.2 03/11/2023 05:44 AM    RDW 16.4 03/11/2023 05:44 AM     03/11/2023 05:44 AM     CMP:    Lab Results   Component Value Date/Time     03/11/2023 05:44 AM    K 4.1 03/11/2023 05:44 AM     03/11/2023 05:44 AM    CO2 21 03/11/2023 05:44 AM    BUN 16 03/11/2023 05:44 AM    CREATININE 0.61 03/11/2023 05:44 AM    GFRAA >60.0 10/11/2022 10:30 AM    LABGLOM >60.0 03/11/2023 05:44 AM    GLUCOSE 89 03/11/2023 05:44 AM    PROT 6.6 03/10/2023 02:30 PM    LABALBU 2.8 03/10/2023 02:30 PM    CALCIUM 10.1 03/11/2023 05:44 AM    BILITOT 0.5 03/10/2023 02:30 PM    ALKPHOS 47 03/10/2023 02:30 PM    AST 30 03/10/2023 02:30 PM    ALT 11 03/10/2023 02:30 PM       ASSESSMENT AND PLAN      # Adult failure to thrive in setting of complex right lower quadrant mass suspicious for colorectal cancer for which family has declined further work-up  - Palliative care to follow. Patient is hospice appropriate. Son declined hospice as of 3/10.   - PT/OT. Case management to assist with placement    History of GNR bacteremia related to RLQ mass  Hypertension  Chronic combined heart failure    Disposition: Awaiting placement. No acute medical needs.        Max Metzger DO  Internal Medicine   Hospitalist    >45 minutes in total care time

## 2023-03-13 VITALS
WEIGHT: 158.4 LBS | HEART RATE: 67 BPM | RESPIRATION RATE: 18 BRPM | TEMPERATURE: 97.5 F | OXYGEN SATURATION: 96 % | BODY MASS INDEX: 30.94 KG/M2 | DIASTOLIC BLOOD PRESSURE: 80 MMHG | SYSTOLIC BLOOD PRESSURE: 106 MMHG

## 2023-03-13 PROBLEM — Z71.89 DNR (DO NOT RESUSCITATE) DISCUSSION: Status: ACTIVE | Noted: 2023-03-13

## 2023-03-13 PROBLEM — R10.9 ABDOMINAL CRAMPING: Status: ACTIVE | Noted: 2023-01-01

## 2023-03-13 PROBLEM — R10.31 ACUTE ABDOMINAL PAIN IN RIGHT LOWER QUADRANT: Status: ACTIVE | Noted: 2023-01-01

## 2023-03-13 PROBLEM — Z78.9 LIMITED CODE STATUS, ALL RESUSCITATION MEASURES EXCLUDING CHEST COMPRESSIONS: Status: ACTIVE | Noted: 2023-03-13

## 2023-03-13 LAB
EKG ATRIAL RATE: 91 BPM
EKG P AXIS: 30 DEGREES
EKG P-R INTERVAL: 198 MS
EKG Q-T INTERVAL: 368 MS
EKG QRS DURATION: 142 MS
EKG QTC CALCULATION (BAZETT): 452 MS
EKG R AXIS: -57 DEGREES
EKG T AXIS: -14 DEGREES
EKG VENTRICULAR RATE: 91 BPM
HCT VFR BLD CALC: 27.4 % (ref 37–47)
HEMOGLOBIN: 9 G/DL (ref 12–16)
MCH RBC QN AUTO: 27.8 PG (ref 27–31.3)
MCHC RBC AUTO-ENTMCNC: 32.7 % (ref 33–37)
MCV RBC AUTO: 85 FL (ref 79.4–94.8)
PDW BLD-RTO: 16.5 % (ref 11.5–14.5)
PLATELET # BLD: 311 K/UL (ref 130–400)
RBC # BLD: 3.23 M/UL (ref 4.2–5.4)
WBC # BLD: 11.3 K/UL (ref 4.8–10.8)

## 2023-03-13 PROCEDURE — G0378 HOSPITAL OBSERVATION PER HR: HCPCS

## 2023-03-13 PROCEDURE — 97535 SELF CARE MNGMENT TRAINING: CPT

## 2023-03-13 PROCEDURE — 6370000000 HC RX 637 (ALT 250 FOR IP)

## 2023-03-13 PROCEDURE — 85027 COMPLETE CBC AUTOMATED: CPT

## 2023-03-13 PROCEDURE — 99222 1ST HOSP IP/OBS MODERATE 55: CPT

## 2023-03-13 PROCEDURE — 6370000000 HC RX 637 (ALT 250 FOR IP): Performed by: INTERNAL MEDICINE

## 2023-03-13 PROCEDURE — 36415 COLL VENOUS BLD VENIPUNCTURE: CPT

## 2023-03-13 PROCEDURE — 2700000000 HC OXYGEN THERAPY PER DAY

## 2023-03-13 PROCEDURE — 2580000003 HC RX 258: Performed by: INTERNAL MEDICINE

## 2023-03-13 PROCEDURE — 99222 1ST HOSP IP/OBS MODERATE 55: CPT | Performed by: PHYSICAL MEDICINE & REHABILITATION

## 2023-03-13 PROCEDURE — 6370000000 HC RX 637 (ALT 250 FOR IP): Performed by: PHYSICAL MEDICINE & REHABILITATION

## 2023-03-13 RX ORDER — LIDOCAINE 4 G/G
3 PATCH TOPICAL DAILY
Status: DISCONTINUED | OUTPATIENT
Start: 2023-03-13 | End: 2023-03-14 | Stop reason: HOSPADM

## 2023-03-13 RX ORDER — LEVOTHYROXINE SODIUM 88 UG/1
88 TABLET ORAL
Status: DISCONTINUED | OUTPATIENT
Start: 2023-03-14 | End: 2023-03-14 | Stop reason: HOSPADM

## 2023-03-13 RX ORDER — CITALOPRAM 20 MG/1
20 TABLET ORAL DAILY
Status: DISCONTINUED | OUTPATIENT
Start: 2023-03-13 | End: 2023-03-14 | Stop reason: HOSPADM

## 2023-03-13 RX ORDER — FERROUS SULFATE 325(65) MG
325 TABLET ORAL 2 TIMES DAILY WITH MEALS
Status: DISCONTINUED | OUTPATIENT
Start: 2023-03-13 | End: 2023-03-14 | Stop reason: HOSPADM

## 2023-03-13 RX ORDER — DIGOXIN 125 MCG
125 TABLET ORAL DAILY
Status: DISCONTINUED | OUTPATIENT
Start: 2023-03-13 | End: 2023-03-14 | Stop reason: HOSPADM

## 2023-03-13 RX ORDER — CHOLECALCIFEROL (VITAMIN D3) 125 MCG
1000 CAPSULE ORAL DAILY
Status: DISCONTINUED | OUTPATIENT
Start: 2023-03-13 | End: 2023-03-14 | Stop reason: HOSPADM

## 2023-03-13 RX ORDER — MECLIZINE HYDROCHLORIDE 25 MG/1
25 TABLET ORAL EVERY 6 HOURS PRN
Status: DISCONTINUED | OUTPATIENT
Start: 2023-03-13 | End: 2023-03-14 | Stop reason: HOSPADM

## 2023-03-13 RX ORDER — L. ACIDOPHILUS/L.BULGARICUS 1MM CELL
1 TABLET ORAL DAILY
Status: DISCONTINUED | OUTPATIENT
Start: 2023-03-13 | End: 2023-03-14 | Stop reason: HOSPADM

## 2023-03-13 RX ORDER — TRAMADOL HYDROCHLORIDE 50 MG/1
50 TABLET ORAL EVERY 6 HOURS PRN
Status: DISCONTINUED | OUTPATIENT
Start: 2023-03-13 | End: 2023-03-14 | Stop reason: HOSPADM

## 2023-03-13 RX ORDER — ANALGESIC BALM 1.74; 4.06 G/29G; G/29G
OINTMENT TOPICAL 3 TIMES DAILY
Status: DISCONTINUED | OUTPATIENT
Start: 2023-03-13 | End: 2023-03-14 | Stop reason: HOSPADM

## 2023-03-13 RX ORDER — PANTOPRAZOLE SODIUM 40 MG/1
40 TABLET, DELAYED RELEASE ORAL
Status: DISCONTINUED | OUTPATIENT
Start: 2023-03-13 | End: 2023-03-14 | Stop reason: HOSPADM

## 2023-03-13 RX ORDER — SUCRALFATE 1 G/1
1 TABLET ORAL 4 TIMES DAILY
Status: DISCONTINUED | OUTPATIENT
Start: 2023-03-13 | End: 2023-03-14 | Stop reason: HOSPADM

## 2023-03-13 RX ADMIN — Medication 1 TABLET: at 11:17

## 2023-03-13 RX ADMIN — CYANOCOBALAMIN TAB 500 MCG 1000 MCG: 500 TAB at 11:17

## 2023-03-13 RX ADMIN — Medication 10 ML: at 20:46

## 2023-03-13 RX ADMIN — SUCRALFATE 1 G: 1 TABLET ORAL at 18:27

## 2023-03-13 RX ADMIN — Medication 10 ML: at 09:36

## 2023-03-13 RX ADMIN — PANTOPRAZOLE SODIUM 40 MG: 40 TABLET, DELAYED RELEASE ORAL at 11:17

## 2023-03-13 RX ADMIN — SUCRALFATE 1 G: 1 TABLET ORAL at 20:40

## 2023-03-13 RX ADMIN — CITALOPRAM HYDROBROMIDE 20 MG: 20 TABLET ORAL at 11:17

## 2023-03-13 RX ADMIN — MENTHOL AND METHYL SALICYLATE: 7.6; 29 OINTMENT TOPICAL at 20:41

## 2023-03-13 RX ADMIN — FERROUS SULFATE TAB 325 MG (65 MG ELEMENTAL FE) 325 MG: 325 (65 FE) TAB at 18:27

## 2023-03-13 RX ADMIN — SUCRALFATE 1 G: 1 TABLET ORAL at 14:23

## 2023-03-13 RX ADMIN — FERROUS SULFATE TAB 325 MG (65 MG ELEMENTAL FE) 325 MG: 325 (65 FE) TAB at 11:17

## 2023-03-13 RX ADMIN — TRAMADOL HYDROCHLORIDE 50 MG: 50 TABLET, COATED ORAL at 20:40

## 2023-03-13 RX ADMIN — TRAMADOL HYDROCHLORIDE 50 MG: 50 TABLET, COATED ORAL at 14:23

## 2023-03-13 RX ADMIN — MENTHOL AND METHYL SALICYLATE: 7.6; 29 OINTMENT TOPICAL at 18:27

## 2023-03-13 ASSESSMENT — PAIN SCALES - GENERAL
PAINLEVEL_OUTOF10: 6
PAINLEVEL_OUTOF10: 8

## 2023-03-13 ASSESSMENT — ENCOUNTER SYMPTOMS
ABDOMINAL PAIN: 0
TROUBLE SWALLOWING: 0
CONSTIPATION: 1
CHOKING: 0
DIARRHEA: 0
BELCHING: 0
ABDOMINAL DISTENTION: 0
RECTAL PAIN: 0
APNEA: 0
VOMITING: 0
SHORTNESS OF BREATH: 0
EYE DISCHARGE: 0
COLOR CHANGE: 0
ANAL BLEEDING: 0
STRIDOR: 0
BLOOD IN STOOL: 0
NAUSEA: 0
BACK PAIN: 0
WHEEZING: 0
VOICE CHANGE: 0
COUGH: 0
SORE THROAT: 0
CONSTIPATION: 0
ABDOMINAL PAIN: 1
CHEST TIGHTNESS: 0

## 2023-03-13 ASSESSMENT — PAIN DESCRIPTION - LOCATION: LOCATION: ABDOMEN

## 2023-03-13 ASSESSMENT — VISUAL ACUITY: OU: 1

## 2023-03-13 NOTE — DISCHARGE INSTR - COC
Continuity of Care Form    Patient Name: Maria C Powell   :  1932  MRN:  [id number]    Admit date:  3/10/2023  Discharge date:  3/14/23    Code Status Order: Limited   Advance Directives:     Admitting Physician:  No admitting provider for patient encounter. PCP: Briseida Small MD    Discharging Nurse: Albertina cisneros [facility] Unit/Room#: Q425/V227-14  Discharging Unit Phone Number: [phone]    Emergency Contact:   Extended Emergency Contact Information  Primary Emergency Contact: [address] Phone: [phone]  Relation: Child  Secondary Emergency Contact: [address] [facility]  Mobile Phone: [phone]  Relation: Child    Past Surgical History:  Past Surgical History:   Procedure Laterality Date    COLONOSCOPY N/A 2023    COLONOSCOPY DIAGNOSTIC performed by Luis Pisano MD at [facility]    IR INS PICC VAD W SQ PORT GREATER THAN 5  2022    IR INS PICC VAD W SQ PORT GREATER THAN 5 2022 [facility] SPECIAL PROCEDURE    JOINT REPLACEMENT Bilateral     knees    OTHER SURGICAL HISTORY Right 2022    cat scan guided abdominal mass aspiration with drain placement by Dr. Nacho Cohen Left 2021    LEFT PLEURAL CATHETER INSERTION performed by Dereck Butcher MD at [place] [address] Left 2021    total of 755 cc removed per Dr David Mathias specimen sent to lab       Immunization History: There is no immunization history on file for this patient. 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 A    Thoracic aortic aneurysm without rupture I71.20    Descending aortic aneurysm ([facility]) I71.9    Vertigo R42    Shortness of breath R06.02    Essential hypertension I10    Acute on chronic combined systolic and diastolic CHF (congestive heart failure) ([facility]) I50.43    Gait abnormality R26.9    Paroxysmal atrial fibrillation ([facility]) I48.0    Pleural effusion J90    Hypoxia R09.02    Acute pulmonary edema ([facility]) J81.0    Acute on chronic combined systolic (congestive) and diastolic (congestive) heart failure ([facility]) I50.43    Acute cystitis with hematuria N30.01    Chronic diastolic CHF (congestive heart failure) ([facility]) I50.32    Right lower quadrant abdominal pain R10.31    Hyponatremia E87.1    Hypokalemia E87.6    Anemia D64.9    CHF (congestive heart failure) ([facility]) I50.9    Hypothyroid E03.9    Colonic mass K63.89    Central retinal vein occlusion, left eye, stable H34.8122    Hx of drainage of abscess Z98.890    Abdominal pain R10.9    Streptococcus viridans infection A49.1    Aortic thrombus ([facility]) I74.10    Intra-abdominal abscess ([facility]) K65.1    Adnexal mass N94.89    Change or removal of drains Z48.03    Difficulty in walking R26.2    Muscle weakness (generalized) D74.26    Acute diastolic HF (heart failure) ([facility]) I50.31    CHF (congestive heart failure), NYHA class I, acute on chronic, combined ([facility]) I50.43    Gastro-esophageal reflux disease without esophagitis K21.9    Intracardiac thrombosis, not elsewhere classified I51.3    Impaired mobility and ADLs Z74.09, Z78.9    Palliative care encounter Z51.5    Advanced care planning/counseling discussion Z71.89    Goals of care, counseling/discussion Z71.89    Adenomatous polyp of descending colon D12.4    Adenomatous polyp of colon D12.6    Weakness R53.1    Encounter for hospice care discussion Z71.89    Pneumonia of left lower lobe due to infectious organism J18.9    Gram-negative bacteremia R78.81    H/O abdominal abscess Z87.898    Generalized weakness R53.1    Severe obesity (BMI 35.0-39. 9) with comorbidity ([facility] [address] E66.01    Adult failure to thrive R62.7       Isolation/Infection:   Isolation            No Isolation          Patient Infection Status       Infection Onset Added Last Indicated Last Indicated By Review Planned Expiration Resolved Resolved By    None active    Resolved    COVID-19 (Rule Out) 01/04/23 01/04/23 01/04/23 COVID-19, Rapid (Ordered)   01/04/23 Rule-Out Test Resulted    C-diff Rule Out 07/24/22 07/24/22 07/24/22 Clostridium Difficile Toxin/Antigen (Ordered)   07/24/22 Rule-Out Test Resulted    COVID-19 (Rule Out) 02/19/21 02/19/21 02/19/21 COVID-19, Rapid (Ordered)   02/19/21 Rule-Out Test Resulted    COVID-19 (Rule Out) 02/10/21 02/10/21 02/10/21 COVID-19 (Ordered)   02/10/21 Rule-Out Test Resulted    COVID-19 (Rule Out) 02/09/21 02/09/21 02/09/21 COVID-19 (Ordered)   02/09/21 Rule-Out Test Resulted    COVID-19 (Rule Out) 02/02/21 02/02/21 02/02/21 COVID-19 (Ordered)   02/02/21 Rule-Out Test Resulted    COVID-19 (Rule Out) 01/25/21 01/25/21 01/26/21 COVID-19 (Ordered)   01/26/21 Rule-Out Test Resulted    COVID-19 (Rule Out) 01/25/21 01/25/21 01/25/21 COVID-19 (Ordered)   01/25/21 Rule-Out Test Resulted            Nurse Assessment:  Last Vital Signs: BP (!) 141/100   Pulse (!) 44   Temp 97.7 °F (36.5 °C) (Oral)   Resp 18   Ht 5' (1.524 m)   Wt 150 lb (68 kg)   SpO2 96%   BMI 29.29 kg/m²     Last documented pain score (0-10 scale): Pain Level: 4  Last Weight:   Wt Readings from Last 1 Encounters:   03/10/23 150 lb (68 kg)     Mental Status:  a&o x 3 speaks Somali dialect    IV Access:  - None    Nursing Mobility/ADLs:  Walking   Assisted  Transfer  Assisted  Bathing  Assisted  Dressing  Assisted  Toileting  Assisted  Feeding  Assisted  Med Admin  Assisted  Med Delivery   whole    Wound Care Documentation and Therapy:  Wound 11/11/22 Pelvis Anterior reddened abd (Active)   Number of days: 121       Incision 02/25/21 Abdomen Lateral;Left;Upper (Active)   Number of days: 746        Elimination:  Continence: Bowel: Yes  Bladder: incont at times  Urinary Catheter: None   Colostomy/Ileostomy/Ileal Conduit: No       Date of Last BM: 3/13/23    Intake/Output Summary (Last 24 hours) at 3/13/2023 1057  Last data filed at 3/12/2023 2230  Gross per 24 hour   Intake 350 ml   Output 360 ml   Net -10 ml     I/O last 3 completed shifts: In: 350 [P.O.:340; I.V.:10]  Out: 360 [Urine:260; Stool:100]    Safety Concerns: At Risk for Falls    Impairments/Disabilities:      Language Barrier - [place]    Nutrition Therapy:  Current Nutrition Therapy:   - Oral Diet:  Low Sodium (2gm)    Routes of Feeding: Oral  Liquids: Thin Liquids  Daily Fluid Restriction: no  Last Modified Barium Swallow with Video (Video Swallowing Test): not done    Treatments at the Time of Hospital Discharge:   Respiratory Treatments: ***  Oxygen Therapy:  is on oxygen at 2 L/min per nasal cannula.   Ventilator:    - No ventilator support    Rehab Therapies: Physical Therapy and Occupational Therapy  Weight Bearing Status/Restrictions: No weight bearing restrictions  Other Medical Equipment (for information only, NOT a DME order):  walker  Other Treatments: ***    Patient's personal belongings (please select all that are sent with patient):  ***    RN SIGNATURE:  Electronically signed by Robin Laird RN on 3/14/23 at 5:25 PM EDT    CASE MANAGEMENT/SOCIAL WORK SECTION    Inpatient Status Date: 3/10/23    Readmission Risk Assessment Score:  Readmission Risk              Risk of Unplanned Readmission:  0           Discharging to Facility/ Agency   Name: [facility]  Address:  Phone:[phone]  Fax:    Dialysis Facility (if applicable)   Name:  Address:  Dialysis Schedule:  Phone:  Fax:    / signature: Electronically signed by JACKLYN Diego on 3/13/23 at 10:59 AM EDT    PHYSICIAN SECTION    Prognosis: {Prognosis:9036560629}    Condition at Discharge: Gerry Alvarado Patient Condition:[id number]}    Rehab Potential (if transferring to Rehab): {Prognosis:2549322715}    Recommended Labs or Other Treatments After Discharge: ***    Physician Certification: I certify the above information and transfer of Cait Wyman  is necessary for the continuing treatment of the diagnosis listed and that she requires {Admit to Appropriate Level of Care:30288} for {GREATER/LESS:268375708} 30 days.      Update Admission H&P: {CHP DME Changes in SRZYD:816484667}    PHYSICIAN SIGNATURE:  Electronically signed by Radha Linares MD on 3/14/23 at 1:20 PM EDT

## 2023-03-13 NOTE — PROGRESS NOTES
Physical Therapy Med Surg Daily Treatment Note  Facility/Department: [facility]  Room: 93/E813-38       NAME: Dilia Rodriguez  : 1932 (80 y.o.)  MRN: [id number]  CODE STATUS: Limited    Date of Service: 3/13/2023    Patient Diagnosis(es): Adult failure to thrive [R62.7]  Gait disturbance [R26.9]  Generalized weakness [R53.1]   Chief Complaint   Patient presents with    Fatigue     Per son, patient has had increased weakness x3 days. Patient has had episodes of nearly falling. Per son, he caught patient and lowered her to the ground both times. Denies head injury. Denies LOC. Denies injury. 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 9) with comorbidity ([facility] [address] 2023    Encounter for hospice care discussion 2023    Pneumonia of left lower lobe due to infectious organism 2023    Gram-negative bacteremia 2023    H/O abdominal abscess 2023    Generalized weakness 2023    Weakness 2023    Adenomatous polyp of descending colon 2023    Adenomatous polyp of colon 2023    Impaired mobility and ADLs 2022    Palliative care encounter 2022    Advanced care planning/counseling discussion 2022    Goals of care, counseling/discussion     Acute diastolic HF (heart failure) ([facility] [address] 2022    CHF (congestive heart failure), NYHA class I, acute on chronic, combined ([facility] [address] 2022    Difficulty in walking 2022    Muscle weakness (generalized) 2022    Gastro-esophageal reflux disease without esophagitis 08/18/2022    Intracardiac thrombosis, not elsewhere classified 08/18/2022    Change or removal of drains 08/17/2022    Adnexal mass 08/16/2022    Intra-abdominal abscess ([address] 08/08/2022    Aortic thrombus ([address] 07/29/2022    Hx of drainage of abscess 07/27/2022    Abdominal pain 07/27/2022    Streptococcus viridans infection 07/27/2022    Colonic mass 07/19/2022    Right lower quadrant abdominal pain 07/18/2022    Hyponatremia 07/18/2022    Hypokalemia 07/18/2022    Anemia 07/18/2022    CHF (congestive heart failure) ([address] 07/18/2022    Hypothyroid 07/18/2022    Central retinal vein occlusion, left eye, stable 04/12/2021    Lumbar stenosis with neurogenic claudication 06/02/2016    Chronic diastolic CHF (congestive heart failure) ([address] 04/01/2022    Acute cystitis with hematuria 02/21/2021    Acute on chronic combined systolic (congestive) and diastolic (congestive) heart failure ([address] 02/09/2021    Pleural effusion 02/08/2021    Hypoxia     Acute pulmonary edema (HCC)     Gait abnormality 01/27/2021    Acute on chronic combined systolic and diastolic CHF (congestive heart failure) ([address] 01/25/2021    Essential hypertension 02/16/2018    Shortness of breath     Vertigo     Thoracic aortic aneurysm without rupture 06/23/2017    Descending aortic aneurysm ([address] 06/23/2017    Osteoarthritis     Myelopathy ([address]     Headache     Depression     Acquired scoliosis 06/02/2016    Osteoporosis 06/02/2016    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    Excess weight 06/02/2016    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/02/2016        Past Medical History:   Diagnosis Date    Ascending aortic aneurysm 06/23/2017    Charcot Arleen Tooth muscular atrophy     CHF (congestive heart failure) (HCC)     Chronic diastolic CHF (congestive heart failure) ([address] 04/01/2022    Depression     Descending aortic aneurysm ([address] 06/23/2017    Essential hypertension 02/16/2018    Headache     HTN (hypertension) Impaired mobility and activities of daily living     Lumbar stenosis with neurogenic claudication     Myelopathy (HCC)     Osteoarthritis     PAF (paroxysmal atrial fibrillation) ([facility] [address]     Anyi Kaiser     Past Surgical History:   Procedure Laterality Date    COLONOSCOPY N/A 1/4/2023    COLONOSCOPY DIAGNOSTIC performed by Maxi Carmen MD at [facility]    IR INS PICC VAD W SQ PORT GREATER THAN 5  9/23/2022    IR INS PICC VAD W SQ PORT GREATER THAN 5 9/23/2022 [facility] SPECIAL PROCEDURE    JOINT REPLACEMENT Bilateral     knees    OTHER SURGICAL HISTORY Right 07/21/2022    cat scan guided abdominal mass aspiration with drain placement by Dr. Ailyn Butler Left 02/25/2021    LEFT PLEURAL CATHETER INSERTION performed by Anma Sandy MD at [place] [address] Left 01/29/2021    total of 755 cc removed per Dr Yoselin Blancas specimen sent to lab       Chart Reviewed: Yes    Restrictions:  Restrictions/Precautions: Fall Risk    SUBJECTIVE:   Subjective: Pt understands some english. Able to give name and birthday. Agreeable to tx. Pain  Pain: Pt pointing to abdomen and moaning in pain, does not rate. OBJECTIVE:        Bed mobility  Rolling to Left: Moderate assistance  Supine to Sit: Maximum assistance;2 Person assistance  Sit to Supine: Maximum assistance  Bed Mobility Comments: bed flat with use of hand rails; increased time and effort to complete; tc's for sequencing and follow through. Heavy lifting assist to get to EOB. Transfers  Sit to Stand: Moderate Assistance;Maximum Assistance;2 Person Assistance  Stand to Sit: Maximum Assistance;2 Person Assistance; Moderate Assistance  Comment: Pt moaning out throughout STS. vc's and tc's for hand placement. lifting assist needed.     Ambulation  Surface: Level tile  Device: Rolling Walker  Assistance: Minimal assistance  Quality of Gait: small shuffling side steps; vc's for increasing step length  Gait Deviations: Decreased step length;Decreased step height  Distance: 3-4 steps                              Activity Tolerance  Activity Tolerance: Patient limited by pain; Other (comment); Patient tolerated treatment well  Activity Tolerance Comments: Language barrier. No Citizen of [place] on iPad          ASSESSMENT   Assessment: Pt stood and ambulated several steps to [facility]. Pt is moaning in pain through tx. but able to complete tasks. Pt required assistance for bed mobility and transfers. Discharge Recommendations:  Continue to assess pending progress, Patient would benefit from continued therapy after discharge, Therapy recommended at discharge         Goals  Long Term Goals  Long Term Goal 1: patient will be able to complete bed mobility with Lorri  Long Term Goal 2: patient will be able to complete transfers with Lorri  Long Term Goal 3: patient will be able to ambulate >/=150' with LRD and supervision    PLAN    General Plan: 1 time a day 3-6 times a week  Safety Devices  Type of Devices: All fall risk precautions in place, Bed alarm in place, Call light within reach, Left in bed, Nurse notified     [facility] ([address] [place] [address]) [address] Mobility Raw Score : 10      Therapy Time   Individual   Time In 1500   Time Out 1515   Minutes 15      Bm/Trsf - 10 mins  Gait - 5 mins       Avery JERRICA Rangel, 03/13/23 at 3:40 PM         Definitions for assistance levels  Independent = pt does not require any physical supervision or assistance from another person for activity completion. Device may be needed.   Stand by assistance = pt requires verbal cues or instructions from another person, close to but not touching, to perform the activity  Minimal assistance= pt performs 75% or more of the activity; assistance is required to complete the activity  Moderate assistance= pt performs 50% of the activity; assistance is required to complete the activity  Maximal assistance = pt performs 25% of the activity; assistance is required to complete the activity  Dependent = pt requires total physical assistance to accomplish the task

## 2023-03-13 NOTE — CARE COORDINATION
Inpatient Rehab referral received. Met with patient and explained [facility] Acute Inpatient Rehab program and requirements, including 3 hours of intense therapy daily, anticipated length of stay and goal of discharge to home. The patient recognized me and stated \"No therapy in too much pain\". I asked the patient about rehab and she says no. I called her son Ed Peters, and he stated his sister will be here in 20 minutes. PM&R consult pending. I will follow up with her daughter. Electronically signed by Luis Enrique Harvey RN on 3/13/23 at 3:55 PM EDT     Patient daughter in from [place] and at bedside. I discussed acute inpatient rehab and how her mom said no earlier. The patient has been too fatigued and not wanting to do therapy. Her daughter said she understands that at this time she is unable. They may consider [facility].   Electronically signed by Luis Enrique Harvey RN on 3/13/23 at 4:38 PM EDT

## 2023-03-13 NOTE — PROGRESS NOTES
Hospitalist Progress Note      PCP: Adolfo Vieyra MD    Date of Admission: 3/10/2023    Chief Complaint:  no acute events, afebrile, stable HD, on 2 liters of NC, is having bloody BMs per nursing staff    Medications:  Reviewed    Infusion Medications    sodium chloride       Scheduled Medications    citalopram  20 mg Oral Daily    digoxin  125 mcg Oral Daily    ferrous sulfate  325 mg Oral BID WC    lactobacillus acidophilus  1 tablet Oral Daily    pantoprazole  40 mg Oral QAM AC    sucralfate  1 g Oral 4x Daily    [START ON 3/14/2023] levothyroxine  88 mcg Oral QAM AC    vitamin B-12  1,000 mcg Oral Daily    [Held by provider] rivaroxaban  15 mg Oral Dinner    sodium chloride flush  5-40 mL IntraVENous 2 times per day     PRN Meds: meclizine, traMADol, sodium chloride flush, sodium chloride, ondansetron **OR** ondansetron, polyethylene glycol, acetaminophen **OR** acetaminophen      Intake/Output Summary (Last 24 hours) at 3/13/2023 1419  Last data filed at 3/12/2023 2230  Gross per 24 hour   Intake 250 ml   Output 360 ml   Net -110 ml       Exam:    BP (!) 141/100   Pulse (!) 44   Temp 97.7 °F (36.5 °C) (Oral)   Resp 18   Ht 5' (1.524 m)   Wt 150 lb (68 kg)   SpO2 96%   BMI 29.29 kg/m²     General appearance: appears stated age and cooperative. Respiratory:  clear to auscultation, bilaterally   Cardiovascular: Regular rate and rhythm, S1/S2 . Abdomen: Soft, active bowel sounds. Musculoskeletal: No edema bilaterally.       Labs:   Recent Labs     03/10/23  1430 03/11/23  0544 03/13/23  0935   WBC 12.3* 11.6* 11.3*   HGB 9.3* 8.4* 9.0*   HCT 27.9* 25.2* 27.4*    290 311     Recent Labs     03/10/23  1430 03/11/23  0544    136   K 4.7 4.1    106   CO2 25 21   BUN 17 16   CREATININE 0.72 0.61   CALCIUM 11.0* 10.1*     Recent Labs     03/10/23  1430   AST 30   ALT 11   BILITOT 0.5   ALKPHOS 47     Recent Labs     03/10/23  1504   INR 3.5     Recent Labs     03/10/23  1430   TROPONINI 0.013* Urinalysis:      Lab Results   Component Value Date/Time    NITRU Negative 03/10/2023 03:45 PM    WBCUA 0-2 03/10/2023 03:45 PM    BACTERIA Negative 03/10/2023 03:45 PM    RBCUA 0-2 03/10/2023 03:45 PM    BLOODU Negative 03/10/2023 03:45 PM    SPECGRAV 1.021 03/10/2023 03:45 PM    GLUCOSEU Negative 03/10/2023 03:45 PM       Radiology:  CT Head W/O Contrast   Final Result   1. No acute intracranial abnormality. 2. Large left mastoid effusion, which may be due to eustachian tube   dysfunction or otomastoiditis. XR CHEST PORTABLE   Final Result   No acute pulmonary process. Cardiomegaly and ectatic thoracic aorta. Assessment/Plan:    79 y/o female with history of PMH of CAD, DVT, PAF, HTN, pleural effusion, combined HF, chronic respiratory failure on 3 liters of O2, aortic aneurysm with mural thrombus, RLQ abscess s/p drainage in 2022, adenocarcinoma of the cecum, anemia who presented with:    Generalized weakness  - continue PT/OT  - acute rehab consult per family request    PAF  - hold Xarelto due to melena    Anemia   - due to GI bleed from adenocarcinoma of the cecum  - family has declined aggressive treatment  - monitor H/H      Diet: ADULT DIET; Regular;  Low Sodium (2 gm)    Code Status: Limited, family refused Hospice per notes, palliative care is following      Disposition - SNF vs acute rehab,  following          Electronically signed by Mita Miller MD on 3/13/2023 at 2:19 PM

## 2023-03-13 NOTE — CARE COORDINATION
Pt and family would like [facility]. Referral was called to [facility] this morning. Pt has GI bleed so not ready for DC today. LSW to follow for DC. LSW updated the pt and daughter that [facility] can accept. They would like to look at acute rehab as a possibility so Alison Mil was notified and referral will be requested so they can assess.

## 2023-03-13 NOTE — CONSULTS
Physical Medicine & Rehabilitation  Consult Note      Admitting Physician: Fritz Alcazar MD    Primary Care Provider: Joanna Tyson MD     Reason for Consult:  Asses rehab needs, promote physical and mental function, analyze level of care to determine rehab needs, improve ability to actively participate in the rehabilitation process, and decrease likelihood of re-admit to the hospital after discharge. History of Present Illness:    Ashwin Arias is a 80 y.o. female admitted to [facility] on 3/10/2023. Patient was admitted through the emergency room after being found to have increased weakness at home. She is known to have colorectal cancer has declined surgical intervention. She has chronic infectious disease issues. Work-up is included a CT of the head to rule no acute disease CT started x-ray of the chest which revealed cardiomegaly but nothing acute she was admitted with failure to thrive and complex right lower quadrant mass suspicious for colorectal cancer. She is hysterogram negative bacteremia with right lower quadrant mass. Previously she has been on long-term and to biotics but I do not see any antibiotics on her case or in her med list.      I am concerned about her ability to participate in any level of care. She does not appear to be receptive to or able to perform any degree of physical activity given her late stage cancer and severe abdominal pain. Her family unfortunately is refusing for us to give her anything stronger than Ultram or Tylenol. Fatigue  This is a recurrent problem. The current episode started in the past 7 days. Associated symptoms include anorexia, arthralgias, fatigue, myalgias and weakness. Pertinent negatives include no abdominal pain, chest pain, chills, congestion, coughing, diaphoresis, fever, headaches, joint swelling, nausea, neck pain, numbness, rash, sore throat or vomiting. The symptoms are aggravated by walking.  She has tried rest for the symptoms. The treatment provided mild relief. Abdominal Pain  This is a recurrent problem. The current episode started in the past 7 days. The pain is at a severity of 6/10. The quality of the pain is aching, dull and a sensation of fullness. The abdominal pain radiates to the epigastric region. Associated symptoms include anorexia, arthralgias, constipation and myalgias. Pertinent negatives include no belching, diarrhea, dysuria, fever, frequency, headaches, hematuria, nausea or vomiting. The pain is aggravated by movement. The pain is relieved by Being still. She has tried acetaminophen and oral narcotic analgesics for the symptoms. The treatment provided mild relief. Prior diagnostic workup includes GI consult. Her past medical history is significant for abdominal surgery. I reviewed recent nursing notes discussed care with acute care providers, \" Pt and family would like [facility]. Referral was called to Riley Torres at [facility] this morning. Pt has GI bleed so not ready for DC today. LSW to follow for DC. LSW updated the pt and daughter that [facility] can accept. They would like to look at acute rehab as a possibility so Junior Spencer was notified and referral will be requested so they can assess \". Events from the previous 24 hours reviewed    . Their inpatient work up has included:    Imaging:  Imaging and other studies reviewed and discussed with patient and staff    CT Head  3/10/2023 BRAIN/VENTRICLES: No mass effect, edema or hemorrhage is seen. Mild generalized cerebral volume loss is noted with mild-to-moderate chronic microvascular ischemic changes. No hydrocephalus or extra-axial fluid is seen. ORBITS: The visualized portion of the orbits demonstrate no acute abnormality. SINUSES:  Mucous retention cysts are seen in both maxillary sinuses. The remainder of the visualized paranasal sinuses are clear. Large left mastoid effusion.  SOFT TISSUES/SKULL:  No acute abnormality of the visualized skull or soft tissues. 1.  No acute intracranial abnormality. 2. Large left mastoid effusion, which may be due to eustachian tube dysfunction or otomastoiditis. XR CHEST  3/10/2023 Heart is enlarged. No airspace consolidation. No pneumothorax or pleural effusion. Focal scarring left lung base. No acute pulmonary process. Cardiomegaly and ectatic thoracic aorta.               Labs:    Labs reviewed and discussed with patient and staff    Lab Results   Component Value Date/Time    POCGLU 115 11/16/2022 04:06 PM    POCGLU 142 11/11/2022 12:32 PM    POCGLU 95 08/11/2022 06:29 AM    POCGLU 113 02/06/2021 04:16 PM    POCGLU 134 02/06/2021 11:21 AM     Lab Results   Component Value Date/Time     03/11/2023 05:44 AM    K 4.1 03/11/2023 05:44 AM     03/11/2023 05:44 AM    CO2 21 03/11/2023 05:44 AM    BUN 16 03/11/2023 05:44 AM    CREATININE 0.61 03/11/2023 05:44 AM    CALCIUM 10.1 03/11/2023 05:44 AM    LABALBU 2.8 03/10/2023 02:30 PM    BILITOT 0.5 03/10/2023 02:30 PM    ALKPHOS 47 03/10/2023 02:30 PM    AST 30 03/10/2023 02:30 PM    ALT 11 03/10/2023 02:30 PM     Lab Results   Component Value Date/Time    WBC 11.3 03/13/2023 09:35 AM    RBC 3.23 03/13/2023 09:35 AM    HGB 9.0 03/13/2023 09:35 AM    HCT 27.4 03/13/2023 09:35 AM    MCV 85.0 03/13/2023 09:35 AM    MCH 27.8 03/13/2023 09:35 AM    MCHC 32.7 03/13/2023 09:35 AM    RDW 16.5 03/13/2023 09:35 AM     03/13/2023 09:35 AM     Lab Results   Component Value Date/Time    VITD25 56.0 10/02/2020 11:59 AM     Lab Results   Component Value Date/Time    COLORU Yellow 03/10/2023 03:45 PM    NITRU Negative 03/10/2023 03:45 PM    GLUCOSEU Negative 03/10/2023 03:45 PM    KETUA TRACE 03/10/2023 03:45 PM    UROBILINOGEN 0.2 03/10/2023 03:45 PM    BILIRUBINUR Negative 03/10/2023 03:45 PM     Lab Results   Component Value Date/Time    PROTIME 35.4 03/10/2023 03:04 PM     Lab Results   Component Value Date/Time    INR 3.5 03/10/2023 03:04 PM         I discussed results with patient. Current Rehabilitation Assessments:    Rehabilitation:  Physical Therapy  Bed mobility:  Bed mobility  Rolling to Left: Maximum assistance (03/11/23 1219)  Rolling to Right: Maximum assistance (03/11/23 1219)  Supine to Sit: Maximum assistance (03/11/23 1219)  Sit to Supine: Maximum assistance (03/11/23 1219)  Bed Mobility Comments: patient required x2 assist for all bed mobility. VC and TC given for appropriate hand placement (03/11/23 1219)  Transfers:  Transfers  Comment: patient only able to sit EOB this date. (03/11/23 1220)  Gait:   Ambulation  Comments: patient declined to ambulated (03/11/23 1227)  Stairs:     W/C mobility:         Occupational therapy:   Hand Dominance: Right  ADL  Feeding: Setup (pt able to hold cup of water and drink from straw) (03/11/23 1326)  Grooming: Moderate assistance (03/11/23 1326)  UE Bathing: Moderate assistance (03/11/23 1326)  LE Bathing: Dependent/Total (03/11/23 1326)  UE Dressing:  Moderate assistance (03/11/23 1326)  LE Dressing: Dependent/Total (03/11/23 1326)  Toileting: Dependent/Total (03/11/23 1326)  Additional Comments: ADL's simulated (03/11/23 1326)  Toilet Transfers  Toilet Transfer: Unable to assess (03/11/23 1328)            Speech therapy:            Diet/Swallow:                         COGNITION  OT: Cognition Comment: pt with difficulty hearing and has language barrier, son reported pt is becoming forgetful  SP:             Re-evals pending      Past Medical History:        Diagnosis Date    Ascending aortic aneurysm 06/23/2017    Charcot Arleen Tooth muscular atrophy     CHF (congestive heart failure) (HCC)     Chronic diastolic CHF (congestive heart failure) ([facility] 04/01/2022    Depression     Descending aortic aneurysm ([facility] [address] 06/23/2017    Essential hypertension 02/16/2018    Headache     HTN (hypertension)     Impaired mobility and activities of daily living     Lumbar stenosis with neurogenic claudication Myelopathy (HCC)     Osteoarthritis     PAF (paroxysmal atrial fibrillation) ([facility] [address]     Kamran Freire         PastSurgical History:        Procedure Laterality Date    COLONOSCOPY N/A 1/4/2023    COLONOSCOPY DIAGNOSTIC performed by Ivan Petersen MD at [facility]    IR INS PICC VAD W SQ PORT GREATER THAN 5  9/23/2022    IR INS PICC VAD W SQ PORT GREATER THAN 5 9/23/2022 [facility] PROCEDURE    JOINT REPLACEMENT Bilateral     knees    OTHER SURGICAL HISTORY Right 07/21/2022    cat scan guided abdominal mass aspiration with drain placement by Dr. Adelso Parham Left 02/25/2021    LEFT PLEURAL CATHETER INSERTION performed by Jesika Palomino MD at [place] [address] Left 01/29/2021    total of 755 cc removed per Dr Sunday Ponce specimen sent to lab         Allergies:     Allergies   Allergen Reactions    Latex     Tape Meryle Grit Tape]           CurrentMedications:   Current Facility-Administered Medications: citalopram (CELEXA) tablet 20 mg, 20 mg, Oral, Daily  digoxin (LANOXIN) tablet 125 mcg, 125 mcg, Oral, Daily  ferrous sulfate (IRON 325) tablet 325 mg, 325 mg, Oral, BID WC  lactobacillus acidophilus (FLORANEX) 1 tablet, 1 tablet, Oral, Daily  meclizine (ANTIVERT) tablet 25 mg, 25 mg, Oral, Q6H PRN  pantoprazole (PROTONIX) tablet 40 mg, 40 mg, Oral, QAM AC  sucralfate (CARAFATE) tablet 1 g, 1 g, Oral, 4x Daily  [START ON 3/14/2023] levothyroxine (SYNTHROID) tablet 88 mcg, 88 mcg, Oral, QAM AC  vitamin B-12 (CYANOCOBALAMIN) tablet 1,000 mcg, 1,000 mcg, Oral, Daily  traMADol (ULTRAM) tablet 50 mg, 50 mg, Oral, Q6H PRN  [Held by provider] rivaroxaban (XARELTO) tablet 15 mg, 15 mg, Oral, Dinner  sodium chloride flush 0.9 % injection 5-40 mL, 5-40 mL, IntraVENous, 2 times per day  sodium chloride flush 0.9 % injection 5-40 mL, 5-40 mL, IntraVENous, PRN  0.9 % sodium chloride infusion, , IntraVENous, PRN  ondansetron (ZOFRAN-ODT) disintegrating tablet 4 mg, 4 mg, Oral, Q8H PRN **OR** ondansetron (ZOFRAN) injection 4 mg, 4 mg, IntraVENous, Q6H PRN  polyethylene glycol (GLYCOLAX) packet 17 g, 17 g, Oral, Daily PRN  acetaminophen (TYLENOL) tablet 650 mg, 650 mg, Oral, Q6H PRN **OR** acetaminophen (TYLENOL) suppository 650 mg, 650 mg, Rectal, Q6H PRN      Social History:  Social History     Socioeconomic History    Marital status:      Spouse name: Not on file    Number of children: 2    Years of education: Not on file    Highest education level: Not on file   Occupational History    Not on file   Tobacco Use    Smoking status: Never    Smokeless tobacco: Never   Vaping Use    Vaping Use: Never used   Substance and Sexual Activity    Alcohol use: No     Alcohol/week: 0.0 standard drinks    Drug use: No    Sexual activity: Not Currently   Other Topics Concern    Not on file   Social History Narrative    , Lives With: Alone, son lives down the street, dtr is in the area    Type of Home: [address]: One level    Home Access: Stairs to enter with rails- Number of Steps: 2- Rails: Both    Bathroom Shower/Tub: Tub/Shower unit, Bathroom Equipment: Grab bars in shower, Shower chair    Home Equipment: Rolling walker, Cane(Pt infrequemtly uses DME for ambulation and prefers to furniture walk in home)    ADL Assistance: Independent, [address]: Independent    Homemaking Responsibilities: Yes    Ambulation Assistance: Independent, Transfer Assistance: Independent    Additional Comments: Son stops over 2 times daily         Social Determinants of Health     Financial Resource Strain: Not on file   Food Insecurity: Not on file   Transportation Needs: Not on file   Physical Activity: Not on file   Stress: Not on file   Social Connections: Not on file   Intimate Partner Violence: Not on file   Housing Stability: Not on file        This history was obtained from family and caregivers and discussed to confirm.       Family History:       Problem Relation Age of Onset Arthritis Mother     Arthritis Father     High Blood Pressure Father     Colon Cancer Brother        Review of Systems:  Review of Systems   Constitutional:  Positive for activity change, appetite change, fatigue and unexpected weight change. Negative for chills, diaphoresis and fever. HENT:  Negative for congestion, drooling, hearing loss, mouth sores, sore throat, trouble swallowing and voice change. Eyes:  Negative for discharge and visual disturbance. Respiratory:  Negative for apnea, cough, choking, chest tightness, shortness of breath, wheezing and stridor. Cardiovascular:  Negative for chest pain, palpitations and leg swelling. Gastrointestinal:  Positive for anorexia and constipation. Negative for abdominal distention, abdominal pain, anal bleeding, blood in stool, diarrhea, nausea, rectal pain and vomiting. Genitourinary:  Negative for difficulty urinating, dysuria, enuresis, frequency and hematuria. Musculoskeletal:  Positive for arthralgias and myalgias. Negative for back pain, gait problem, joint swelling and neck pain. Skin:  Negative for color change, pallor, rash and wound. Allergic/Immunologic: Negative for food allergies and immunocompromised state. Neurological:  Positive for weakness. Negative for dizziness, tremors, seizures, syncope, facial asymmetry, speech difficulty, light-headedness, numbness and headaches. Hematological:  Negative for adenopathy. Does not bruise/bleed easily. Psychiatric/Behavioral:  Negative for confusion, decreased concentration, dysphoric mood, self-injury, sleep disturbance and suicidal ideas. The patient is not nervous/anxious. All other systems reviewed and are negative. Physical Exam:  BP (!) 141/100   Pulse (!) 44   Temp 97.7 °F (36.5 °C) (Oral)   Resp 18   Ht 5' (1.524 m)   Wt 150 lb (68 kg)   SpO2 96%   BMI 29.29 kg/m²      Physical Exam  Vitals and nursing note reviewed. Constitutional:       General: She is awake. She is not in acute distress. Appearance: She is cachectic. She is ill-appearing. Interventions: Nasal cannula in place. Comments: 3L NC   HENT:      Head: Normocephalic and atraumatic. No right periorbital erythema or left periorbital erythema. Jaw: There is normal jaw occlusion. Right Ear: External ear normal. No laceration or swelling. Left Ear: External ear normal. No laceration or swelling. Nose: Nose normal. No nasal deformity, signs of injury, laceration or rhinorrhea. Mouth/Throat:      Lips: Pink. No lesions. Mouth: Mucous membranes are moist. No injury or angioedema. Eyes:      General: Lids are normal. Vision grossly intact. Gaze aligned appropriately. No scleral icterus. Right eye: No discharge. Left eye: No discharge. Extraocular Movements: Extraocular movements intact. Conjunctiva/sclera: Conjunctivae normal.      Pupils: Pupils are equal, round, and reactive to light. Cardiovascular:      Rate and Rhythm: Tachycardia present. Rhythm irregular. Pulses: Decreased pulses. Dorsalis pedis pulses are 1+ on the right side and 1+ on the left side. Heart sounds: Murmur heard. Pulmonary:      Effort: Pulmonary effort is normal. No respiratory distress. Breath sounds: Decreased breath sounds present. No wheezing, rhonchi or rales. Abdominal:      General: Abdomen is protuberant. Bowel sounds are normal. There is no distension. Palpations: Abdomen is soft. There is mass (RLQ). Tenderness: There is abdominal tenderness in the right lower quadrant and left lower quadrant. There is no guarding or rebound. Musculoskeletal:      Cervical back: Full passive range of motion without pain and normal range of motion. No edema or erythema. No pain with movement. Right lower leg: No edema. Left lower leg: No edema. Feet:      Right foot:      Skin integrity: Dry skin present. No blister.       Toenail Condition: Right toenails are normal.      Left foot:      Skin integrity: Dry skin present. No blister. Toenail Condition: Left toenails are normal.   Skin:     General: Skin is warm and dry. Capillary Refill: Capillary refill takes less than 2 seconds. Coloration: Skin is pale. Findings: Bruising present. No signs of injury, rash or wound. Nails: There is no clubbing. Neurological:      General: No focal deficit present. Mental Status: She is alert and oriented to person, place, and time. Mental status is at baseline. GCS: GCS eye subscore is 4. GCS verbal subscore is 5. GCS motor subscore is 6. Cranial Nerves: No facial asymmetry. Motor: Weakness present. Gait: Gait abnormal.   Psychiatric:         Attention and Perception: Attention and perception normal.         Mood and Affect: Mood and affect normal.         Speech: Speech normal.         Behavior: Behavior normal. Behavior is cooperative. Thought Content: Thought content normal. Thought content does not include homicidal or suicidal ideation. Cognition and Memory: Cognition and memory normal.         Judgment: Judgment normal.     Ortho Exam  Neurologic Exam     Mental Status   Oriented to person, place, and time. Speech: speech is normal   Level of consciousness: alert  Knowledge: good. Cranial Nerves     CN III, IV, VI   Pupils are equal, round, and reactive to light.           Diagnostics:    Recent Results (from the past 24 hour(s))   CBC    Collection Time: 03/13/23  9:35 AM   Result Value Ref Range    WBC 11.3 (H) 4.8 - 10.8 K/uL    RBC 3.23 (L) 4.20 - 5.40 M/uL    Hemoglobin 9.0 (L) 12.0 - 16.0 g/dL    Hematocrit 27.4 (L) 37.0 - 47.0 %    MCV 85.0 79.4 - 94.8 fL    MCH 27.8 27.0 - 31.3 pg    MCHC 32.7 (L) 33.0 - 37.0 %    RDW 16.5 (H) 11.5 - 14.5 %    Platelets 461 365 - 761 K/uL              Impression:    Impaired mobility and ADLs due to  colonic mass  Factors favoring recovery include:  good family support at home        Complex Active General Medical Issues that complicate care and require daily medical supervision: 1. Principal Problem:    Adult failure to thrive  Active Problems:    Limited code status, all resuscitation measures excluding chest compressions    Abdominal cramping    Acute abdominal pain in right lower quadrant    DNR (do not resuscitate) discussion  Resolved Problems:    * No resolved hospital problems. *            Recommendations:    Considering all of the factors above including the patient's current medical status, social status/home environment, their functional needs, and their ability to participate in a therapy program, I feel that they would best be served at:         Hospice services long-term care may also consider skilled rehabilitation level of care if patient's pain gets under control. It is my opinion that they will NOT be able to tolerate and benefit from 3 hours of therapy a day. I reviewed the various other options re: levels of care with the patient and family. Specialized nursing care to focus on: Bowel and bladder issues-Monitor for urinary retention-check PVRs, bladder scan--cath if no void. Wound risk and management   -pressure relief protocols-side to side turns  IVF medication administration      Monitor endurance and if necessary spread therapy out over a 7-day window-adding scheduled rest breaks when needed. Focus on energy conservation. Monitor heart rate and   cardiac medications effects on heart rate and blood pressure before, during and after therapy. Progress toward endurance training with pulse ox monitoring for saturation and heart rate. continue to monitor closely for dehydration-- Improve hydration and nutrition by adding Vitamin B12 shot times one, adding Protein supplements and push PO fluids.     Treat and monitor for higher level cognitive deficits, focus on difficulty with sequencing and problem-solving. Focus on higher-level balance and falls risk issues focusing on balance training and monitoring for orthostasis. Above recommendations are indicated to address medical complexity and need for appropriate rehab services. Will tailor individual care and rehab plan per individuals needs re  --control abd pain. Focus of today's plan-   attempt palliative measures for pain management and advised that patient trial Norco or stronger pain medication      Required Certification Data (potential inpatient rehabilitation facility patient's only)    Deficits:weakness, nutrition, mobility, impaired gait, high risk for falls, frequent falls, decreased endurance, deconditioning , pain limiting function, and balance and righting reactions    Disability:mobility, locomotion, self care, and bowel/ bladder status    Potential barriers to progress/discharge:complex medical conditions, severe pain, complex social situations, and bowel/bladder dysfunction         It was my pleasure to evaluate [address] today. Please call [phone] with questions.     Catherine Apple, DO

## 2023-03-13 NOTE — CONSULTS
Palliative Care Consult Note  Patient: Miladis Small  Gender: female  YOB: 1932  Unit/Bed: W758/S988-89  CodeStatus: Limited  Inpatient Treatment Team: Treatment Team: Attending Provider: Marni Dang MD; : Dale Plasencia, RN; Registered Nurse: Renetta Arizmendi RN; Utilization Reviewer: Amanda Fay RN  Admit Date:  3/10/2023    Chief Complaint: Fatigue    History of Presenting Illness:      Miladis Small is a 80 y.o. female on hospital day 0 with a history of chronic respiratory failure on home oxygen 2-3 L, shortness of breath, CHF, chronic diastolic CHF, atrial fibrillation, hypertension, abdominal abscess, CAD, ascending aortic aneurysm, Charcot-Arleen-Tooth muscular atrophy, depression, headache, lumbar stenosis, myelopathy, OA. Patient was brought to the emergency room with fatigue, controlled fall and progressive weakness. Patient was admitted in the setting of adult failure to thrive with a history of complex right lower quadrant mass suspicious for colorectal cancer which patient has declined work-up/intervention. Palliative care was consulted for goals of care, CODE STATUS discussion, family support, and symptom management. Upon entering the room I find the patient asleep on her left side wearing 3L NC oxygen. Patient lives at home with her son Kevin Odell. Per EMR and past interaction with patient, about 30 lb weight loss over last few months. Patient/health caregiver has had moderate-significant functional decline over last 2 weeks. Called and spoke with patient's son Kevin Odell and daughter Adelaida Solano via telephone call. Patient and patient's family okay to trial low-dose pain medication such as Ultram but not Norco at this time. Patient's nurse concern for abdominal cramping. Reviewed patient's current condition, palliative care benefits, advance care planning, goals of care and current symptoms.   Patient and patient's family's current goal is to pursue inpatient rehab and or [facility] Gilsong as a backup for rehab. Patient and patient's family would like to increase/maintain patient's current quality of life, strength and endurance. Patient and patient's family not agreeable to sign-on with palliative care in the outpatient setting at this time, but appreciative of additional care during hospitalization. Most recent notable labs: Calcium 10.1, troponin 0.013, albumin 2.8, globulin 3.8, WBC 11.3, RBC 3.23, hemoglobin 9.0, hematocrit 27.4      Review of Systems:       Review of Systems   Constitutional:  Positive for activity change, appetite change, fatigue and unexpected weight change. Negative for chills, diaphoresis and fever. HENT:  Negative for drooling, hearing loss, mouth sores, sore throat, trouble swallowing and voice change. Eyes:  Negative for discharge and visual disturbance. Respiratory:  Negative for apnea, cough, choking, chest tightness, shortness of breath, wheezing and stridor. Cardiovascular:  Negative for chest pain, palpitations and leg swelling. Gastrointestinal:  Positive for abdominal pain. Negative for abdominal distention, anal bleeding, blood in stool, constipation, diarrhea, nausea, rectal pain and vomiting. Genitourinary:  Negative for difficulty urinating, dysuria, enuresis, frequency and hematuria. Musculoskeletal:  Negative for arthralgias, back pain, gait problem, joint swelling and myalgias. Skin:  Negative for color change, pallor, rash and wound. Allergic/Immunologic: Negative for food allergies and immunocompromised state. Neurological:  Negative for dizziness, tremors, seizures, syncope, facial asymmetry, speech difficulty, weakness, light-headedness, numbness and headaches. Hematological:  Negative for adenopathy. Does not bruise/bleed easily. Psychiatric/Behavioral:  Negative for confusion, decreased concentration, dysphoric mood, self-injury, sleep disturbance and suicidal ideas.  The patient is not nervous/anxious. All other systems reviewed and are negative. Physical Examination:       BP (!) 141/100   Pulse (!) 44   Temp 97.7 °F (36.5 °C) (Oral)   Resp 18   Ht 5' (1.524 m)   Wt 150 lb (68 kg)   SpO2 96%   BMI 29.29 kg/m²    Physical Exam  Vitals and nursing note reviewed. Constitutional:       General: She is awake. She is not in acute distress. Appearance: She is cachectic. She is ill-appearing. Interventions: Nasal cannula in place. Comments: 3L NC   HENT:      Head: Normocephalic and atraumatic. No right periorbital erythema or left periorbital erythema. Jaw: There is normal jaw occlusion. Right Ear: External ear normal. No laceration or swelling. Left Ear: External ear normal. No laceration or swelling. Nose: Nose normal. No nasal deformity, signs of injury, laceration or rhinorrhea. Mouth/Throat:      Lips: Pink. No lesions. Mouth: Mucous membranes are moist. No injury or angioedema. Eyes:      General: Lids are normal. Vision grossly intact. Gaze aligned appropriately. No scleral icterus. Right eye: No discharge. Left eye: No discharge. Extraocular Movements: Extraocular movements intact. Conjunctiva/sclera: Conjunctivae normal.      Pupils: Pupils are equal, round, and reactive to light. Cardiovascular:      Rate and Rhythm: Tachycardia present. Rhythm irregular. Pulses: Decreased pulses. Dorsalis pedis pulses are 1+ on the right side and 1+ on the left side. Heart sounds: Murmur heard. Pulmonary:      Effort: Pulmonary effort is normal. No respiratory distress. Breath sounds: Decreased breath sounds present. No wheezing, rhonchi or rales. Abdominal:      General: Abdomen is protuberant. Bowel sounds are normal. There is no distension. Palpations: Abdomen is soft. There is mass (RLQ). Tenderness:  There is abdominal tenderness in the right lower quadrant and left lower quadrant. There is no guarding or rebound. Musculoskeletal:      Cervical back: Full passive range of motion without pain and normal range of motion. No edema or erythema. No pain with movement. Right lower leg: No edema. Left lower leg: No edema. Feet:      Right foot:      Skin integrity: Dry skin present. No blister. Toenail Condition: Right toenails are normal.      Left foot:      Skin integrity: Dry skin present. No blister. Toenail Condition: Left toenails are normal.   Skin:     General: Skin is warm and dry. Capillary Refill: Capillary refill takes less than 2 seconds. Coloration: Skin is pale. Findings: Bruising present. No signs of injury, rash or wound. Nails: There is no clubbing. Neurological:      General: No focal deficit present. Mental Status: She is alert and oriented to person, place, and time. Mental status is at baseline. GCS: GCS eye subscore is 4. GCS verbal subscore is 5. GCS motor subscore is 6. Cranial Nerves: No facial asymmetry. Motor: Weakness present. Gait: Gait abnormal.   Psychiatric:         Attention and Perception: Attention and perception normal.         Mood and Affect: Mood and affect normal.         Speech: Speech normal.         Behavior: Behavior normal. Behavior is cooperative. Thought Content: Thought content normal. Thought content does not include homicidal or suicidal ideation. Cognition and Memory: Cognition and memory normal.         Judgment: Judgment normal.       Allergies:       Allergies   Allergen Reactions    Latex     Tape Meryle Grit Tape]        Medications:      Current Facility-Administered Medications   Medication Dose Route Frequency Provider Last Rate Last Admin    citalopram (CELEXA) tablet 20 mg  20 mg Oral Daily Hope Edgar MD        digoxin (LANOXIN) tablet 125 mcg  125 mcg Oral Daily Hope Edgar MD        ferrous sulfate (IRON 325) tablet 325 mg  325 mg Oral BID WC Darryl Mitchell MD        lactobacillus acidophilus (FLORANEX) 1 tablet  1 tablet Oral Daily Darryl Mitchell MD        meclizine (ANTIVERT) tablet 25 mg  25 mg Oral Q6H PRN Darryl Mitchell MD        pantoprazole (PROTONIX) tablet 40 mg  40 mg Oral QAM AC Darryl Mitchell MD        sucralfate (CARAFATE) tablet 1 g  1 g Oral 4x Daily Darryl Mitchell MD        [START ON 3/14/2023] levothyroxine (SYNTHROID) tablet 88 mcg  88 mcg Oral QAM AC Darryl Mitchell MD        vitamin B-12 (CYANOCOBALAMIN) tablet 1,000 mcg  1,000 mcg Oral Daily Darryl Mitchell MD        [Held by provider] rivaroxaban (XARELTO) tablet 15 mg  15 mg Oral [facility] [place], DO   15 mg at 03/12/23 1647    sodium chloride flush 0.9 % injection 5-40 mL  5-40 mL IntraVENous 2 times per day Loreatha Big, DO   10 mL at 03/13/23 0936    sodium chloride flush 0.9 % injection 5-40 mL  5-40 mL IntraVENous PRN Loreatha Big, DO        0.9 % sodium chloride infusion   IntraVENous PRN Loreatha Big, DO        ondansetron (ZOFRAN-ODT) disintegrating tablet 4 mg  4 mg Oral Q8H PRN Loreatha Big, DO        Or    ondansetron [facility]) injection 4 mg  4 mg IntraVENous Q6H PRN Loreatha Big, DO        polyethylene glycol (GLYCOLAX) packet 17 g  17 g Oral Daily PRN Loreatha Big, DO        acetaminophen (TYLENOL) tablet 650 mg  650 mg Oral Q6H PRN Loreatha Big, DO   650 mg at 03/12/23 1500    Or    acetaminophen (TYLENOL) suppository 650 mg  650 mg Rectal Q6H PRN Loreatha Big, DO           History:       PMHx:  Past Medical History:   Diagnosis Date    Ascending aortic aneurysm 06/23/2017    Charcot Arleen Tooth muscular atrophy     CHF (congestive heart failure) (HCC)     Chronic diastolic CHF (congestive heart failure) ([facility] 04/01/2022    Depression     Descending aortic aneurysm ([facility] 06/23/2017    Essential hypertension 02/16/2018    Headache     HTN (hypertension)     Impaired mobility and activities of daily living     Lumbar stenosis with neurogenic claudication     Myelopathy (HCC)     Osteoarthritis     PAF (paroxysmal atrial fibrillation) ([facility]     Adilene Gunter       PSHx:  Past Surgical History:   Procedure Laterality Date    COLONOSCOPY N/A 1/4/2023    COLONOSCOPY DIAGNOSTIC performed by Jeannie William MD at [facility]    IR INS PICC VAD W SQ PORT GREATER THAN 5  9/23/2022    IR INS PICC VAD W SQ PORT GREATER THAN 5 9/23/2022 [facility] SPECIAL PROCEDURE    JOINT REPLACEMENT Bilateral     knees    OTHER SURGICAL HISTORY Right 07/21/2022    cat scan guided abdominal mass aspiration with drain placement by Dr. Yumiko Daniels Left 02/25/2021    LEFT PLEURAL CATHETER INSERTION performed by Florentin Wills MD at [place] [id number] Left 01/29/2021    total of 755 cc removed per Dr Jaimes Both specimen sent to lab       Social Hx:  Social History     Socioeconomic History    Marital status:       Spouse name: None    Number of children: 2    Years of education: None    Highest education level: None   Tobacco Use    Smoking status: Never    Smokeless tobacco: Never   Vaping Use    Vaping Use: Never used   Substance and Sexual Activity    Alcohol use: No     Alcohol/week: 0.0 standard drinks    Drug use: No    Sexual activity: Not Currently   Social History Narrative    , Lives With: Alone, son lives down the street, dtr is in the area    Type of Home: [address]: One level    Home Access: Stairs to enter with rails- Number of Steps: 2- Rails: Both    Bathroom Shower/Tub: Tub/Shower unit, Bathroom Equipment: Grab bars in shower, Shower chair    Home Equipment: Rolling walker, Cane(Pt infrequemtly uses DME for ambulation and prefers to furniture walk in home)    ADL Assistance: Independent, [address]: Independent    Homemaking Responsibilities: Yes    Ambulation Assistance: Independent, Transfer Assistance: Independent    Additional Comments: Son stops over 2 times daily Family Hx:  Family History   Problem Relation Age of Onset    Arthritis Mother     Arthritis Father     High Blood Pressure Father     Colon Cancer Brother        LABS: Reviewed     CBC:  Lab Results   Component Value Date/Time    WBC 11.3 03/13/2023 09:35 AM    RBC 3.23 03/13/2023 09:35 AM    HGB 9.0 03/13/2023 09:35 AM    HCT 27.4 03/13/2023 09:35 AM    MCV 85.0 03/13/2023 09:35 AM    MCH 27.8 03/13/2023 09:35 AM    MCHC 32.7 03/13/2023 09:35 AM    RDW 16.5 03/13/2023 09:35 AM     03/13/2023 09:35 AM     CBC with Differential:   Lab Results   Component Value Date/Time    WBC 11.3 03/13/2023 09:35 AM    RBC 3.23 03/13/2023 09:35 AM    HGB 9.0 03/13/2023 09:35 AM    HCT 27.4 03/13/2023 09:35 AM     03/13/2023 09:35 AM    MCV 85.0 03/13/2023 09:35 AM    MCH 27.8 03/13/2023 09:35 AM    MCHC 32.7 03/13/2023 09:35 AM    RDW 16.5 03/13/2023 09:35 AM    BANDSPCT 2 07/24/2022 05:00 AM    METASPCT 2 07/24/2022 05:00 AM    LYMPHOPCT 9.3 03/11/2023 05:44 AM    MONOPCT 8.4 03/11/2023 05:44 AM    BASOPCT 0.6 03/11/2023 05:44 AM    MONOSABS 1.0 03/11/2023 05:44 AM    LYMPHSABS 1.1 03/11/2023 05:44 AM    EOSABS 0.2 03/11/2023 05:44 AM    BASOSABS 0.1 03/11/2023 05:44 AM     CMP:    Lab Results   Component Value Date/Time     03/11/2023 05:44 AM    K 4.1 03/11/2023 05:44 AM     03/11/2023 05:44 AM    CO2 21 03/11/2023 05:44 AM    BUN 16 03/11/2023 05:44 AM    CREATININE 0.61 03/11/2023 05:44 AM    GFRAA >60.0 10/11/2022 10:30 AM    LABGLOM >60.0 03/11/2023 05:44 AM    GLUCOSE 89 03/11/2023 05:44 AM    PROT 6.6 03/10/2023 02:30 PM    LABALBU 2.8 03/10/2023 02:30 PM    CALCIUM 10.1 03/11/2023 05:44 AM    BILITOT 0.5 03/10/2023 02:30 PM    ALKPHOS 47 03/10/2023 02:30 PM    AST 30 03/10/2023 02:30 PM    ALT 11 03/10/2023 02:30 PM     BMP:    Lab Results   Component Value Date/Time     03/11/2023 05:44 AM    K 4.1 03/11/2023 05:44 AM     03/11/2023 05:44 AM    CO2 21 03/11/2023 05:44 AM BUN 16 03/11/2023 05:44 AM    LABALBU 2.8 03/10/2023 02:30 PM    CREATININE 0.61 03/11/2023 05:44 AM    CALCIUM 10.1 03/11/2023 05:44 AM    GFRAA >60.0 10/11/2022 10:30 AM    LABGLOM >60.0 03/11/2023 05:44 AM    GLUCOSE 89 03/11/2023 05:44 AM     TSH:   Lab Results   Component Value Date/Time    TSH 2.690 03/17/2022 01:56 PM     Urinalysis:   Lab Results   Component Value Date/Time    NITRU Negative 03/10/2023 03:45 PM    WBCUA 0-2 03/10/2023 03:45 PM    BACTERIA Negative 03/10/2023 03:45 PM    RBCUA 0-2 03/10/2023 03:45 PM    BLOODU Negative 03/10/2023 03:45 PM    SPECGRAV 1.021 03/10/2023 03:45 PM    GLUCOSEU Negative 03/10/2023 03:45 PM     Albumin:   Lab Results   Component Value Date    LABALBU 2.8 (L) 03/10/2023     Weight Trends  Wt Readings from Last 10 Encounters:   03/10/23 150 lb (68 kg)   01/27/23 182 lb 6.4 oz (82.7 kg)   01/08/23 214 lb (97.1 kg)   01/04/23 150 lb (68 kg)   12/29/22 150 lb (68 kg)   11/23/22 150 lb 6 oz (68.2 kg)   11/16/22 150 lb 4.8 oz (68.2 kg)   11/02/22 160 lb (72.6 kg)   10/24/22 155 lb (70.3 kg)   10/03/22 155 lb (70.3 kg)          FUNCTIONAL ADL´S:     Independent: [  ] Eating, [   ] Dressing, [   ] Transferring, [   ] Tracy Colon, [   ] Dorathy Gambler, [   ] Continence  Dependent   : [  ] Eating, [   ] Dressing, [   ] Transferring, [   ] Tracy Colon, [   ] Dorathy Gambler, [   ] Continence  W. assistant : [ X ] Eating, [ X ] Dressing, [ X ] Transferring, [ X ] Toileting, [ X ] Bathing, [ X ] Continence    Radiology: Reviewed      CT Result (most recent):  CT HEAD WO CONTRAST 03/10/2023    Narrative  EXAMINATION:  CT OF THE HEAD WITHOUT CONTRAST  3/10/2023 5:50 pm    TECHNIQUE:  CT of the head was performed without the administration of intravenous  contrast. Automated exposure control, iterative reconstruction, and/or weight  based adjustment of the mA/kV was utilized to reduce the radiation dose to as  low as reasonably achievable. COMPARISON:  None.     HISTORY:  ORDERING SYSTEM PROVIDED HISTORY: weakness  TECHNOLOGIST PROVIDED HISTORY:  Reason for exam:->weakness  Has a \"code stroke\" or \"stroke alert\" been called? ->No  Decision Support Exception - unselect if not a suspected or confirmed  emergency medical condition->Emergency Medical Condition (MA)  What reading provider will be dictating this exam?->CRC    FINDINGS:  BRAIN/VENTRICLES: No mass effect, edema or hemorrhage is seen. Mild  generalized cerebral volume loss is noted with mild-to-moderate chronic  microvascular ischemic changes. No hydrocephalus or extra-axial fluid is  seen. ORBITS: The visualized portion of the orbits demonstrate no acute abnormality. SINUSES:  Mucous retention cysts are seen in both maxillary sinuses. The  remainder of the visualized paranasal sinuses are clear. Large left mastoid  effusion. SOFT TISSUES/SKULL:  No acute abnormality of the visualized skull or soft  tissues. Impression  1. No acute intracranial abnormality. 2. Large left mastoid effusion, which may be due to eustachian tube  dysfunction or otomastoiditis. Xray Result (most recent):  XR CHEST PORTABLE 03/10/2023    Narrative  EXAMINATION:  ONE XRAY VIEW OF THE CHEST    3/10/2023 4:46 pm    COMPARISON:  None. HISTORY:  ORDERING SYSTEM PROVIDED HISTORY: sob  TECHNOLOGIST PROVIDED HISTORY:  Reason for exam:->sob  What reading provider will be dictating this exam?->CRC    FINDINGS:  Heart is enlarged. No airspace consolidation. No pneumothorax or pleural  effusion. Focal scarring left lung base. Impression  No acute pulmonary process. Cardiomegaly and ectatic thoracic aorta. Assessment and plan:  Palliative care encounter: Patient and family not ready for palliative care at this time but are appreciative of extra care during hospitalization. Palliative care will follow while patient is hospitalized.     Hospice: Patient suffers from multiple medical co-morbidities, however, due to acute medical conditions, hospice eligibility can be determined after healing from acute illnesses. Patient and patient's family current goals of care do not align with hospice care. I have discussed/reviewed the patient's case with nursing staff and case management. 3.  Advance Care Planning  Discussed goals of care with patient. Explained in extensive detail nuances between full code, DNR CCA and DNR CC. Patient has made the decision to be limited CODE STATUS, no CPR or intubation but defibrillation and resuscitative medications okay. Patient has previously elected her son Ata Felder as her primary decision-maker and HCPOA with a current copy on file. 4.  Goals of Care Discussion:  Disease process and goals of treatment were discussed in basic terms. Xiomarai's goal is to optimize available comfort care measures and continue with current plan of care with consideration of comfort care measures included. We discussed the palliative care philosophy in light of those goals. We discussed all care options contingent on treatment response and QOL. Much active listening, presence, and emotional support were given. 5.  Acute abdominal pain  6. Abdominal cramping  -Ordered/start Ultram 50 mg p.o. every 6 hours for moderate-severe pain. Patient's family would like to start off with a lower pain medication, gave okay for Ultram but not Norco at this time.  -Will continue to follow and inquire about pain control while hospitalized. -Patient with known right lower quadrant mass/cancer. Now mass enlarged and causing acute pain.  -Patient declined previous work-up/intervention for right lower quadrant mass that is highly suspicious to be cancerous. Patient is a very high risk for any surgical intervention and outcomes of surgery do not align with patient's goals of care.   -If patient still experiencing abdominal cramping after initiation and trial of medication Ultram, may consider adding Bentyl for abdominal cramping.    -Patient is currently being treated for multiple medical conditions by other providers  Adult failure to thrive  History of GNR bacteremia related to right lower quadrant mass, suspicious for colorectal cancer and patient/family declined further work-up  Hypertension  Chronic combined heart failure  Melena and frequent stools, rule out GI bleed    MDM: Moderate    Electronically signed by ADRIENNE Gambino CNP on 3/13/2023 at 10:15 AM    Collaborating physician: Dr. Laci Iglesias     Please note this report is partially produced by using speech recognition hardware. It may contain errors related to the system, including grammar, punctuation and spelling as well as words and phrases that may seem inaccurate. For any questions or concerns feel free to contact me for clarification. Thanks for the opportunity you have allowed us to provide palliative care to [address] [place] [address]. We will be in touch as care progresses. Please feel free to reach out to us should you have any questions or requests.

## 2023-03-14 VITALS
RESPIRATION RATE: 18 BRPM | SYSTOLIC BLOOD PRESSURE: 124 MMHG | HEART RATE: 67 BPM | HEIGHT: 60 IN | DIASTOLIC BLOOD PRESSURE: 85 MMHG | TEMPERATURE: 97 F | WEIGHT: 150 LBS | BODY MASS INDEX: 29.45 KG/M2 | OXYGEN SATURATION: 94 %

## 2023-03-14 LAB — SARS-COV-2, NAAT: NOT DETECTED

## 2023-03-14 PROCEDURE — 99232 SBSQ HOSP IP/OBS MODERATE 35: CPT | Performed by: PHYSICAL MEDICINE & REHABILITATION

## 2023-03-14 PROCEDURE — G0378 HOSPITAL OBSERVATION PER HR: HCPCS

## 2023-03-14 PROCEDURE — 87635 SARS-COV-2 COVID-19 AMP PRB: CPT

## 2023-03-14 PROCEDURE — 2700000000 HC OXYGEN THERAPY PER DAY

## 2023-03-14 PROCEDURE — 6370000000 HC RX 637 (ALT 250 FOR IP): Performed by: PHYSICAL MEDICINE & REHABILITATION

## 2023-03-14 PROCEDURE — 6370000000 HC RX 637 (ALT 250 FOR IP)

## 2023-03-14 PROCEDURE — 99232 SBSQ HOSP IP/OBS MODERATE 35: CPT

## 2023-03-14 PROCEDURE — 6370000000 HC RX 637 (ALT 250 FOR IP): Performed by: INTERNAL MEDICINE

## 2023-03-14 PROCEDURE — 1210000000 HC MED SURG R&B

## 2023-03-14 RX ORDER — TRAMADOL HYDROCHLORIDE 50 MG/1
50 TABLET ORAL EVERY 6 HOURS PRN
Qty: 10 TABLET | Refills: 0 | Status: SHIPPED | OUTPATIENT
Start: 2023-03-14 | End: 2023-03-17

## 2023-03-14 RX ADMIN — TRAMADOL HYDROCHLORIDE 50 MG: 50 TABLET, COATED ORAL at 10:31

## 2023-03-14 RX ADMIN — CITALOPRAM HYDROBROMIDE 20 MG: 20 TABLET ORAL at 10:31

## 2023-03-14 RX ADMIN — FERROUS SULFATE TAB 325 MG (65 MG ELEMENTAL FE) 325 MG: 325 (65 FE) TAB at 10:31

## 2023-03-14 RX ADMIN — MENTHOL AND METHYL SALICYLATE: 7.6; 29 OINTMENT TOPICAL at 10:32

## 2023-03-14 RX ADMIN — LEVOTHYROXINE SODIUM 88 MCG: 88 TABLET ORAL at 06:08

## 2023-03-14 RX ADMIN — CYANOCOBALAMIN TAB 500 MCG 1000 MCG: 500 TAB at 10:31

## 2023-03-14 RX ADMIN — SUCRALFATE 1 G: 1 TABLET ORAL at 10:31

## 2023-03-14 RX ADMIN — PANTOPRAZOLE SODIUM 40 MG: 40 TABLET, DELAYED RELEASE ORAL at 06:08

## 2023-03-14 RX ADMIN — DIGOXIN 125 MCG: 125 TABLET ORAL at 10:31

## 2023-03-14 RX ADMIN — TRAMADOL HYDROCHLORIDE 50 MG: 50 TABLET, COATED ORAL at 16:48

## 2023-03-14 RX ADMIN — MENTHOL AND METHYL SALICYLATE: 7.6; 29 OINTMENT TOPICAL at 16:48

## 2023-03-14 RX ADMIN — FERROUS SULFATE TAB 325 MG (65 MG ELEMENTAL FE) 325 MG: 325 (65 FE) TAB at 16:48

## 2023-03-14 RX ADMIN — Medication 1 TABLET: at 10:30

## 2023-03-14 ASSESSMENT — ENCOUNTER SYMPTOMS
BACK PAIN: 0
CHEST TIGHTNESS: 0
SHORTNESS OF BREATH: 0
EYE DISCHARGE: 0
CONSTIPATION: 0
CHOKING: 0
SORE THROAT: 0
RECTAL PAIN: 0
ABDOMINAL PAIN: 1
COLOR CHANGE: 0
STRIDOR: 0
TROUBLE SWALLOWING: 0
NAUSEA: 0
APNEA: 0
VOMITING: 0
DIARRHEA: 0
BLOOD IN STOOL: 0
COUGH: 0
ANAL BLEEDING: 0
WHEEZING: 0
VOICE CHANGE: 0
ABDOMINAL DISTENTION: 0

## 2023-03-14 ASSESSMENT — VISUAL ACUITY: OU: 1

## 2023-03-14 NOTE — CARE COORDINATION
SPOKE W/JOSE CARLOS FROM [facility] TO UPDATE THAT PT WANTS TO TRY THERAPY NOW THAT PAIN IS BEING CONTROLLED. DISCHARGE PLAN REMAINS KOV WHEN MEDICALLY CLEARED. CM/LSW TO FOLLOW. IF THERAPY PURSUIT IS NOT SUCCESSFUL THEY MAY CONSIDER HOSPICE AT THAT TIME.

## 2023-03-14 NOTE — PROGRESS NOTES
Physical Therapy Missed Treatment   Facility/Department: [facility] MED SURG K875/Z229-76    NAME: Shasha Giron    : 1932 (80 y.o.)  MRN: [id number]    Account: [de-identified]  Gender: female        [x] Patient Declines PT Treatment: attempted x2. Patient declines due to abdominal pain.            Electronically signed by Rola Jordan PTA on 3/14/23 at 3:58 PM EDT

## 2023-03-14 NOTE — CARE COORDINATION
[facility] Pre-Admission Screening Document      Patient Name: Marin Foley       MRN: [id number]    : 1932    Age: 80 y.o. Gender: female   Payor: Payor: MEDICARE / Plan: MEDICARE PART A AND B / Product Type: *No Product type* /   MSSP: No    Admitted from: [facility] Floor: 2W  Attending Care Provider: Isidro Brar MD  Inpatient Rehab Referring Care Provider: Isidro Brar MD   Primary Care Provider: John Navarro MD  Inpatient Treatment Team including Consults: Treatment Team: Attending Provider: Isidro Brar MD; : Uzair Contreras RN; Utilization Reviewer: Molly Valencia RN; Consulting Physician: Kamini Loya DO; Registered Nurse: Chrissy Cowan RN; : Mirna Brown RN    Reason for Hospitalization:   1. Generalized weakness    2. Gait disturbance      Chief Complaint   Patient presents with    Fatigue     Per son, patient has had increased weakness x3 days. Patient has had episodes of nearly falling. Per son, he caught patient and lowered her to the ground both times. Denies head injury. Denies LOC. Denies injury. Isolation:No active isolations    Hospital Course:  Admit Date: 3/10/2023  1:59 PM  Inpatient Rehab Referral Date: 3/13/23  Narrative of hospital course/history of present illness: 80 yr old female presented to ED d/t increasing weakness and episodes of near falling. Patient with known colorectal cancer for which she has declined surgical intervention, has been having blood in her stool for quite some time. Admitted with Adult failure to thrive in setting of complex right lower quadrant mass suspicious for colorectal cancer for which family has declined further work-up. Patient is hospice appropriate, but son declined hospice as of 3/10.      Palliative Care: Patient suffers from multiple medical co-morbidities, however, due to acute medical conditions, hospice eligibility can be determined after healing from acute illnesses. Patient and patient's family current goals of care do not align with hospice care. Medical & Surgical History/Current Comorbidities:  Past Medical History:   Diagnosis Date    Ascending aortic aneurysm 06/23/2017    Charcot Arleen Tooth muscular atrophy     CHF (congestive heart failure) (HCC)     Chronic diastolic CHF (congestive heart failure) ([facility] 04/01/2022    Depression     Descending aortic aneurysm ([facility] [address] 06/23/2017    Essential hypertension 02/16/2018    Headache     HTN (hypertension)     Impaired mobility and activities of daily living     Lumbar stenosis with neurogenic claudication     Myelopathy (HCC)     Osteoarthritis     PAF (paroxysmal atrial fibrillation) ([facility] [address]     Sandip Blanc     Past Surgical History:   Procedure Laterality Date    COLONOSCOPY N/A 1/4/2023    COLONOSCOPY DIAGNOSTIC performed by Telma Crowley MD at [facility]    IR INS PICC VAD W SQ PORT GREATER THAN 5  9/23/2022    IR INS PICC VAD W SQ PORT GREATER THAN 5 9/23/2022 [facility] PROCEDURE    JOINT REPLACEMENT Bilateral     knees    OTHER SURGICAL HISTORY Right 07/21/2022    cat scan guided abdominal mass aspiration with drain placement by Dr. Layne Lung Left 02/25/2021    LEFT PLEURAL CATHETER INSERTION performed by Jaida Noriega MD at [place] [address] Left 01/29/2021    total of 755 cc removed per Dr Hugo Patient specimen sent to lab       Advanced Directives:  Advance Directives       Power of [address] Will ACP-Advance Directive ACP-Power of     Not on File Filed on 01/05/23 Filed Not on File            Labs/Infection Control:  Recent Labs     03/13/23  0935   WBC 11.3*   HGB 9.0*   HCT 27.4*         Blood cultures:  No results for input(s): BC in the last 72 hours.     Urinalysis/C&S:  No results for input(s): NITRITE, LABCAST, WBCUA, RBCUA, MUCUS, TRICHOMONAS, YEAST, BACTERIA, LEUKOCYTESUR, BLOODU, GLUCOSEU, KETUA, AMORPHOUS, Cesar Lown in the last 72 hours. Radiology:  CT Head W/O Contrast  Result Date: 3/10/2023  1. No acute intracranial abnormality. 2. Large left mastoid effusion, which may be due to eustachian tube dysfunction or otomastoiditis. XR CHEST PORTABLE  Result Date: 3/10/2023  No acute pulmonary process. Cardiomegaly and ectatic thoracic aorta. Medications/IV's:  Xarelto on hold     Scheduled:    citalopram, 20 mg, Oral, Daily    digoxin, 125 mcg, Oral, Daily    ferrous sulfate, 325 mg, Oral, BID WC    lactobacillus acidophilus, 1 tablet, Oral, Daily    pantoprazole, 40 mg, Oral, QAM AC    sucralfate, 1 g, Oral, 4x Daily    levothyroxine, 88 mcg, Oral, QAM AC    vitamin B-12, 1,000 mcg, Oral, Daily    lidocaine, 3 patch, TransDERmal, Daily    analgesic ointment, , Topical, TID    [Held by provider] rivaroxaban, 15 mg, Oral, Dinner    sodium chloride flush, 5-40 mL, IntraVENous, 2 times per day    PRN:  meclizine, traMADol, sodium chloride flush, sodium chloride, ondansetron **OR** ondansetron, polyethylene glycol, acetaminophen **OR** acetaminophen    Allergies: Allergies   Allergen Reactions    Latex     Tape Rommie Pear Tape]          Most Recent Vitals, Height and Weight  BP (!) 141/100   Pulse (!) 44   Temp 97.7 °F (36.5 °C) (Oral)   Resp 18   Ht 5' (1.524 m)   Wt 150 lb (68 kg)   SpO2 96%   BMI 29.29 kg/m²     Weight Bearing Restrictions: wbat       Current Diet Order: ADULT DIET; Regular;  Low Sodium (2 gm)    Skin: wdl         Lungs:   Respiratory  Respiratory Pattern: Regular  Respiratory Depth: Normal  Respiratory Quality/Effort: Unlabored  Chest Assessment: Chest expansion symmetrical  L Breath Sounds: Diminished  R Breath Sounds: Diminished  Level of Activity/Mobility: Bedrest  Breath Sounds  Right Upper Lobe: Diminished, Clear  Right Middle Lobe: Diminished, Clear  Right Lower Lobe: Diminished, Clear  Left Upper Lobe: Diminished, Clear  Left Lower Lobe: Diminished, Clear      Cognition and Behavior:  Language Preference (if other than English):      Alertness/Behavior  Neuro (WDL): Within Defined Limits  Level of Consciousness: Alert (0)  History of Falling: No Cognition Comment: pt with difficulty hearing and has language barrier, son reported pt is becoming forgetful    Short Term Memory Deficits     History of Falling: No    Safety  Patient's Judgement Adequate to Safely Complete Daily Activities: No       Prior Level of Function and Living Arrangements:  Social/Functional History  Lives With: Alone  Type of Home: House  Home Layout: One level, Able to Live on Main level with bedroom/bathroom  Home Access: Stairs to enter with rails  Entrance Stairs - Number of Steps: 2  Bathroom Shower/Tub: Walk-in shower  Bathroom Toilet: Standard  Bathroom Equipment: Grab bars in shower, Shower chair, Hand-held shower  Home Equipment: Laruth Commander, rolling, Oxygen  Receives Help From: Family  ADL Assistance: Independent  Homemaking Responsibilities: No  Ambulation Assistance: Independent ([address])  Transfer Assistance: Independent  Active : No  Patient's  Info: Son  Additional Comments: Social Functional was given by Harley Hamman due to language and hearing barriers. Patient's son lives a few houses away and visits 3x a day. will complete housework and meal prep.   Living Arrangements: Other (Comment) (SON)  Support Systems: Children, Other (Comment) ([facility] [employer])  Type of Home Care Services: None  Dental Appliances: None  Vision - Corrective Lenses: None  Hearing Aid: None  Personal Equipment:   Dental Appliances: None  Vision - Corrective Lenses: None  Hearing Aid: None      CURRENT FUNCTIONAL LEVEL:  Physical Therapy  Bed mobility:  Bed mobility  Rolling to Left: Moderate assistance (03/13/23 1531)  Rolling to Right: Maximum assistance (03/11/23 1219)  Supine to Sit: Maximum assistance;2 Person assistance (03/13/23 1531)  Sit to Supine: Maximum assistance (03/13/23 1531)  Bed Mobility Comments: bed flat with use of hand rails; increased time and effort to complete; tc's for sequencing and follow through. Heavy lifting assist to get to EOB. (03/13/23 1531)  Transfers:  Transfers  Sit to Stand: Moderate Assistance;Maximum Assistance;2 Person Assistance (03/13/23 1533)  Stand to Sit: Maximum Assistance;2 Person Assistance; Moderate Assistance (03/13/23 1533)  Comment: Pt moaning out throughout STS. vc's and tc's for hand placement. lifting assist needed. (03/13/23 1533)  Gait:   Ambulation  Surface: Level tile (03/13/23 1535)  Device: Rolling Walker (03/13/23 1535)  Assistance: Minimal assistance (03/13/23 1535)  Quality of Gait: small shuffling side steps; vc's for increasing step length (03/13/23 1535)  Gait Deviations: Decreased step length;Decreased step height (03/13/23 1535)  Distance: 3-4 steps (03/13/23 1535)  Comments: patient declined to ambulated (03/11/23 1227)  Stairs:     W/C mobility:         Occupational Therapy  Hand Dominance: Right  ADL  Feeding: Setup (pt able to hold cup of water and drink from straw) (03/11/23 1326)  Grooming: Moderate assistance (03/11/23 1326)  UE Bathing: Moderate assistance (03/11/23 1326)  LE Bathing: Dependent/Total (03/11/23 1326)  UE Dressing:  Moderate assistance (03/11/23 1326)  LE Dressing: Dependent/Total (03/11/23 1326)  Toileting: Dependent/Total (03/11/23 1326)  Additional Comments: ADL's simulated (03/11/23 1326)  Toilet Transfers  Toilet Transfer: Unable to assess (03/11/23 1328)            Speech Language Pathology n/a          Rehab evaluation plan: Recommend SNF vs Hospice with LTC  Reviewer's Signature: Electronically signed by Ryan Malik RN on 3/14/23 at 8:00 AM EDT

## 2023-03-14 NOTE — PROGRESS NOTES
Palliative Care Progress Note  Patient: Naomi Bowen  Gender: female  YOB: 1932  Unit/Bed: I447/L125-44  CodeStatus: Limited  Inpatient Treatment Team: Treatment Team: Attending Provider: Nicole Puente MD; : Allana Hatchet, RN; Utilization Reviewer: Chip Cosby, RN; Consulting Physician: Stephanie Veras DO; : Serjio Griffith, RN; Registered Nurse: Cabrera Strickland RN  Admit Date:  3/10/2023    Chief Complaint: Fatigue    History of Presenting Illness:      Naomi Bowen is a 80 y.o. female on hospital day 0 with a history of chronic respiratory failure on home oxygen 2-3 L, shortness of breath, CHF, chronic diastolic CHF, atrial fibrillation, hypertension, abdominal abscess, CAD, ascending aortic aneurysm, Charcot-Arleen-Tooth muscular atrophy, depression, headache, lumbar stenosis, myelopathy, OA. Patient was brought to the emergency room with fatigue, controlled fall and progressive weakness. Patient was admitted in the setting of adult failure to thrive with a history of complex right lower quadrant mass suspicious for colorectal cancer which patient has declined work-up/intervention. Palliative care was consulted for goals of care, CODE STATUS discussion, family support, and symptom management. Upon entering the room I find the patient asleep on her left side wearing 3L NC oxygen. Patient lives at home with her son Guy Meza. Per EMR and past interaction with patient, about 30 lb weight loss over last few months. Patient/health caregiver has had moderate-significant functional decline over last 2 weeks. Called and spoke with patient's son Guy Meza and daughter Rodolfo Go via telephone call. Patient and patient's family okay to trial low-dose pain medication such as Ultram but not Norco at this time. Patient's nurse concern for abdominal cramping.   Reviewed patient's current condition, palliative care benefits, advance care planning, goals of care and current symptoms. Patient and patient's family's current goal is to pursue inpatient rehab and or [facility] as a backup for rehab. Patient and patient's family would like to increase/maintain patient's current quality of life, strength and endurance. Patient and patient's family not agreeable to sign-on with palliative care in the outpatient setting at this time, but appreciative of additional care during hospitalization. Most recent notable labs: Calcium 10.1, troponin 0.013, albumin 2.8, globulin 3.8, WBC 11.3, RBC 3.23, hemoglobin 9.0, hematocrit 27.4    3/14/2023:  Upon entering the room I find the patient laying in bed with 3L NC. Patient's daughter Kelly Jenkins at bedside. Per conversation with nursing staff and patient's family pain has improved with the addition Ultram. Patient pain better controlled at this time. Patient's family had questions about the benefits of Hospice care as the patient feels \"it feels like may be coming to the end\". Reviewed patient's current condition and answered all of there their questions. Reviewed hospice eligibility and additional care provided with program. Patient and patient's family wish to trial therapy to maintain current quality of life. Patient and family agree that if therapy is not successful they my pursue Hospice for additional and comfort care. Most recent notable labs: Calcium 10.1, WBC 11.3, RBC 3.23, Hemoglobin 9.0, Hematocrit 27.4      Review of Systems:       Review of Systems   Constitutional:  Positive for activity change, appetite change, fatigue and unexpected weight change. Negative for chills, diaphoresis and fever. HENT:  Negative for drooling, hearing loss, mouth sores, sore throat, trouble swallowing and voice change. Eyes:  Negative for discharge and visual disturbance. Respiratory:  Negative for apnea, cough, choking, chest tightness, shortness of breath, wheezing and stridor.     Cardiovascular:  Negative for chest pain, palpitations and leg swelling. Gastrointestinal:  Positive for abdominal pain. Negative for abdominal distention, anal bleeding, blood in stool, constipation, diarrhea, nausea, rectal pain and vomiting. Genitourinary:  Negative for difficulty urinating, dysuria, enuresis, frequency and hematuria. Musculoskeletal:  Negative for arthralgias, back pain, gait problem, joint swelling and myalgias. Skin:  Negative for color change, pallor, rash and wound. Allergic/Immunologic: Negative for food allergies and immunocompromised state. Neurological:  Negative for dizziness, tremors, seizures, syncope, facial asymmetry, speech difficulty, weakness, light-headedness, numbness and headaches. Hematological:  Negative for adenopathy. Does not bruise/bleed easily. Psychiatric/Behavioral:  Negative for confusion, decreased concentration, dysphoric mood, self-injury, sleep disturbance and suicidal ideas. The patient is not nervous/anxious. All other systems reviewed and are negative. Physical Examination:       /85   Pulse 67   Temp 97 °F (36.1 °C) (Oral)   Resp 18   Ht 5' (1.524 m)   Wt 150 lb (68 kg)   SpO2 94%   BMI 29.29 kg/m²    Physical Exam  Vitals and nursing note reviewed. Constitutional:       General: She is awake. She is not in acute distress. Appearance: She is cachectic. She is ill-appearing. Interventions: Nasal cannula in place. Comments: 3L NC   HENT:      Head: Normocephalic and atraumatic. No right periorbital erythema or left periorbital erythema. Jaw: There is normal jaw occlusion. Right Ear: External ear normal. No laceration or swelling. Left Ear: External ear normal. No laceration or swelling. Nose: Nose normal. No nasal deformity, signs of injury, laceration or rhinorrhea. Mouth/Throat:      Lips: Pink. No lesions. Mouth: Mucous membranes are moist. No injury or angioedema. Eyes:      General: Lids are normal. Vision grossly intact.  Gaze aligned appropriately. No scleral icterus. Right eye: No discharge. Left eye: No discharge. Extraocular Movements: Extraocular movements intact. Conjunctiva/sclera: Conjunctivae normal.      Pupils: Pupils are equal, round, and reactive to light. Cardiovascular:      Rate and Rhythm: Tachycardia present. Rhythm irregular. Pulses: Decreased pulses. Dorsalis pedis pulses are 1+ on the right side and 1+ on the left side. Heart sounds: Murmur heard. Pulmonary:      Effort: Pulmonary effort is normal. No respiratory distress. Breath sounds: Decreased breath sounds present. No wheezing, rhonchi or rales. Abdominal:      General: Abdomen is protuberant. Bowel sounds are normal. There is no distension. Palpations: Abdomen is soft. There is mass (RLQ). Tenderness: There is abdominal tenderness in the right lower quadrant and left lower quadrant. There is no guarding or rebound. Musculoskeletal:         General: No deformity or signs of injury. Cervical back: Full passive range of motion without pain and normal range of motion. No edema or erythema. No pain with movement. Right lower leg: No edema. Left lower leg: No edema. Feet:      Right foot:      Skin integrity: Dry skin present. No blister. Toenail Condition: Right toenails are normal.      Left foot:      Skin integrity: Dry skin present. No blister. Toenail Condition: Left toenails are normal.   Skin:     General: Skin is warm and dry. Capillary Refill: Capillary refill takes less than 2 seconds. Coloration: Skin is pale. Findings: Bruising present. No signs of injury, rash or wound. Nails: There is no clubbing. Neurological:      General: No focal deficit present. Mental Status: She is alert and oriented to person, place, and time. Mental status is at baseline. GCS: GCS eye subscore is 4. GCS verbal subscore is 5. GCS motor subscore is 6. Cranial Nerves: No facial asymmetry. Motor: Weakness present. Gait: Gait abnormal.   Psychiatric:         Attention and Perception: Attention and perception normal.         Mood and Affect: Mood and affect normal.         Speech: Speech normal.         Behavior: Behavior normal. Behavior is cooperative. Thought Content: Thought content normal. Thought content does not include homicidal or suicidal ideation. Cognition and Memory: Cognition and memory normal.         Judgment: Judgment normal.       Allergies:       Allergies   Allergen Reactions    Latex     Tape Elwanda Busing Tape]        Medications:      Current Facility-Administered Medications   Medication Dose Route Frequency Provider Last Rate Last Admin    citalopram (CELEXA) tablet 20 mg  20 mg Oral Daily Faisal Mello MD   20 mg at 03/13/23 1117    digoxin (LANOXIN) tablet 125 mcg  125 mcg Oral Daily Faisal Mello MD        ferrous sulfate (IRON 325) tablet 325 mg  325 mg Oral BID WC Faisal Mello MD   325 mg at 03/13/23 1827    lactobacillus acidophilus [facility]) 1 tablet  1 tablet Oral Daily Faisal Mello MD   1 tablet at 03/13/23 1117    meclizine (ANTIVERT) tablet 25 mg  25 mg Oral Q6H PRN Faisal Mello MD        pantoprazole (PROTONIX) tablet 40 mg  40 mg Oral QAM AC Faisal Mello MD   40 mg at 03/14/23 0608    sucralfate (CARAFATE) tablet 1 g  1 g Oral 4x Daily Faisal Mello MD   1 g at 03/13/23 2040    levothyroxine (SYNTHROID) tablet 88 mcg  88 mcg Oral QAM AC Faisal Mello MD   88 mcg at 03/14/23 9157    vitamin B-12 (CYANOCOBALAMIN) tablet 1,000 mcg  1,000 mcg Oral Daily Faisal Mello MD   1,000 mcg at 03/13/23 1117    traMADol (ULTRAM) tablet 50 mg  50 mg Oral Q6H PRN ADRIENNE Currie - CNP   50 mg at 03/13/23 2040    lidocaine 4 % external patch 3 patch  3 patch TransDERmal Daily Deb Scullin DO   3 patch at 03/13/23 1827    analgesic ointment ointment   Topical TID Clernesto Nye DO   Given at 03/13/23 2041    [Held by provider] rivaroxaban (XARELTO) tablet 15 mg  15 mg Oral [address] [place], DO   15 mg at 03/12/23 1647    sodium chloride flush 0.9 % injection 5-40 mL  5-40 mL IntraVENous 2 times per day Everlena Dark, DO   10 mL at 03/13/23 2046    sodium chloride flush 0.9 % injection 5-40 mL  5-40 mL IntraVENous PRN Everlena Dark, DO        0.9 % sodium chloride infusion   IntraVENous PRN Everlena Dark, DO        ondansetron (ZOFRAN-ODT) disintegrating tablet 4 mg  4 mg Oral Q8H PRN Everlena Dark, DO        Or    ondansetron [facility]) injection 4 mg  4 mg IntraVENous Q6H PRN Everlena Dark, DO        polyethylene glycol (GLYCOLAX) packet 17 g  17 g Oral Daily PRN Everlena Dark, DO        acetaminophen (TYLENOL) tablet 650 mg  650 mg Oral Q6H PRN Everlena Dark, DO   650 mg at 03/12/23 1500    Or    acetaminophen (TYLENOL) suppository 650 mg  650 mg Rectal Q6H PRN Everlena Dark, DO           History:       PMHx:  Past Medical History:   Diagnosis Date    Ascending aortic aneurysm 06/23/2017    Charcot Arleen Tooth muscular atrophy     CHF (congestive heart failure) (HCC)     Chronic diastolic CHF (congestive heart failure) ([facility] [address] 04/01/2022    Depression     Descending aortic aneurysm ([facility] [address] 06/23/2017    Essential hypertension 02/16/2018    Headache     HTN (hypertension)     Impaired mobility and activities of daily living     Lumbar stenosis with neurogenic claudication     Myelopathy (HCC)     Osteoarthritis     PAF (paroxysmal atrial fibrillation) ([facility] [address]     Solitario Love       PSHx:  Past Surgical History:   Procedure Laterality Date    COLONOSCOPY N/A 1/4/2023    COLONOSCOPY DIAGNOSTIC performed by Yara Todd MD at [facility]    IR INS PICC VAD W SQ PORT GREATER THAN 5  9/23/2022    IR INS PICC VAD W SQ PORT GREATER THAN 5 9/23/2022 [facility]ED[facility]    JOINT REPLACEMENT Bilateral     knees    OTHER SURGICAL HISTORY Right 07/21/2022    cat scan guided abdominal mass aspiration with drain placement by Dr. Ramón Hargrove Left 02/25/2021    LEFT PLEURAL CATHETER INSERTION performed by Clive Bridges MD at [place] [address] Left 01/29/2021    total of 755 cc removed per Dr Jorja Sandifer specimen sent to lab       Social Hx:  Social History     Socioeconomic History    Marital status:       Spouse name: None    Number of children: 2    Years of education: None    Highest education level: None   Tobacco Use    Smoking status: Never    Smokeless tobacco: Never   Vaping Use    Vaping Use: Never used   Substance and Sexual Activity    Alcohol use: No     Alcohol/week: 0.0 standard drinks    Drug use: No    Sexual activity: Not Currently   Social History Narrative    , Lives With: Alone, son lives down the street, dtr is in the area    Type of Home: [address]: One level    Home Access: Stairs to enter with rails- Number of Steps: 2- Rails: Both    Bathroom Shower/Tub: Tub/Shower unit, Bathroom Equipment: Grab bars in shower, Shower chair    Home Equipment: Rolling walker, Cane(Pt infrequemtly uses DME for ambulation and prefers to furniture walk in home)    ADL Assistance: Independent, [address]: [place] [address] Responsibilities: Yes    Ambulation Assistance: Independent, Transfer Assistance: Independent    Additional Comments: Son stops over 2 times daily           Family Hx:  Family History   Problem Relation Age of Onset    Arthritis Mother     Arthritis Father     High Blood Pressure Father     Colon Cancer Brother        LABS: Reviewed     CBC:  Lab Results   Component Value Date/Time    WBC 11.3 03/13/2023 09:35 AM    RBC 3.23 03/13/2023 09:35 AM    HGB 9.0 03/13/2023 09:35 AM    HCT 27.4 03/13/2023 09:35 AM    MCV 85.0 03/13/2023 09:35 AM    MCH 27.8 03/13/2023 09:35 AM    MCHC 32.7 03/13/2023 09:35 AM    RDW 16.5 03/13/2023 09:35 AM     03/13/2023 09:35 AM     CBC with Differential:   Lab Results Component Value Date/Time    WBC 11.3 03/13/2023 09:35 AM    RBC 3.23 03/13/2023 09:35 AM    HGB 9.0 03/13/2023 09:35 AM    HCT 27.4 03/13/2023 09:35 AM     03/13/2023 09:35 AM    MCV 85.0 03/13/2023 09:35 AM    MCH 27.8 03/13/2023 09:35 AM    MCHC 32.7 03/13/2023 09:35 AM    RDW 16.5 03/13/2023 09:35 AM    BANDSPCT 2 07/24/2022 05:00 AM    METASPCT 2 07/24/2022 05:00 AM    LYMPHOPCT 9.3 03/11/2023 05:44 AM    MONOPCT 8.4 03/11/2023 05:44 AM    BASOPCT 0.6 03/11/2023 05:44 AM    MONOSABS 1.0 03/11/2023 05:44 AM    LYMPHSABS 1.1 03/11/2023 05:44 AM    EOSABS 0.2 03/11/2023 05:44 AM    BASOSABS 0.1 03/11/2023 05:44 AM     CMP:    Lab Results   Component Value Date/Time     03/11/2023 05:44 AM    K 4.1 03/11/2023 05:44 AM     03/11/2023 05:44 AM    CO2 21 03/11/2023 05:44 AM    BUN 16 03/11/2023 05:44 AM    CREATININE 0.61 03/11/2023 05:44 AM    GFRAA >60.0 10/11/2022 10:30 AM    LABGLOM >60.0 03/11/2023 05:44 AM    GLUCOSE 89 03/11/2023 05:44 AM    PROT 6.6 03/10/2023 02:30 PM    LABALBU 2.8 03/10/2023 02:30 PM    CALCIUM 10.1 03/11/2023 05:44 AM    BILITOT 0.5 03/10/2023 02:30 PM    ALKPHOS 47 03/10/2023 02:30 PM    AST 30 03/10/2023 02:30 PM    ALT 11 03/10/2023 02:30 PM     BMP:    Lab Results   Component Value Date/Time     03/11/2023 05:44 AM    K 4.1 03/11/2023 05:44 AM     03/11/2023 05:44 AM    CO2 21 03/11/2023 05:44 AM    BUN 16 03/11/2023 05:44 AM    LABALBU 2.8 03/10/2023 02:30 PM    CREATININE 0.61 03/11/2023 05:44 AM    CALCIUM 10.1 03/11/2023 05:44 AM    GFRAA >60.0 10/11/2022 10:30 AM    LABGLOM >60.0 03/11/2023 05:44 AM    GLUCOSE 89 03/11/2023 05:44 AM     TSH:   Lab Results   Component Value Date/Time    TSH 2.690 03/17/2022 01:56 PM     Urinalysis:   Lab Results   Component Value Date/Time    NITRU Negative 03/10/2023 03:45 PM    WBCUA 0-2 03/10/2023 03:45 PM    BACTERIA Negative 03/10/2023 03:45 PM    RBCUA 0-2 03/10/2023 03:45 PM    BLOODU Negative 03/10/2023 03:45 PM    SPECGRAV 1.021 03/10/2023 03:45 PM    GLUCOSEU Negative 03/10/2023 03:45 PM     Albumin:   Lab Results   Component Value Date    LABALBU 2.8 (L) 03/10/2023     Weight Trends  Wt Readings from Last 10 Encounters:   03/10/23 150 lb (68 kg)   02/14/23 158 lb 6.4 oz (71.8 kg)   01/27/23 182 lb 6.4 oz (82.7 kg)   01/08/23 214 lb (97.1 kg)   01/04/23 150 lb (68 kg)   12/29/22 150 lb (68 kg)   11/23/22 150 lb 6 oz (68.2 kg)   11/16/22 150 lb 4.8 oz (68.2 kg)   11/02/22 160 lb (72.6 kg)   10/24/22 155 lb (70.3 kg)          FUNCTIONAL ADL´S:     Independent: [  ] Eating, [   ] Dressing, [   ] Transferring, [   ] Franky Ramos, [   ] Deborah Collazo, [   ] Continence  Dependent   : [  ] Eating, [   ] Dressing, [   ] Transferring, [   ] Franky Ramos, [   ] Deborah Collazo, [   ] Continence  W. assistant : [ X ] Eating, [ X ] Dressing, [ X ] Transferring, [ X ] Toileting, [ X ] Bathing, [ X ] Continence    Radiology: Reviewed      CT Result (most recent):  CT HEAD WO CONTRAST 03/10/2023    Narrative  EXAMINATION:  CT OF THE HEAD WITHOUT CONTRAST  3/10/2023 5:50 pm    TECHNIQUE:  CT of the head was performed without the administration of intravenous  contrast. Automated exposure control, iterative reconstruction, and/or weight  based adjustment of the mA/kV was utilized to reduce the radiation dose to as  low as reasonably achievable. COMPARISON:  None. HISTORY:  ORDERING SYSTEM PROVIDED HISTORY: weakness  TECHNOLOGIST PROVIDED HISTORY:  Reason for exam:->weakness  Has a \"code stroke\" or \"stroke alert\" been called? ->No  Decision Support Exception - unselect if not a suspected or confirmed  emergency medical condition->Emergency Medical Condition (MA)  What reading provider will be dictating this exam?->CRC    FINDINGS:  BRAIN/VENTRICLES: No mass effect, edema or hemorrhage is seen. Mild  generalized cerebral volume loss is noted with mild-to-moderate chronic  microvascular ischemic changes.   No hydrocephalus or extra-axial fluid is  seen. ORBITS: The visualized portion of the orbits demonstrate no acute abnormality. SINUSES:  Mucous retention cysts are seen in both maxillary sinuses. The  remainder of the visualized paranasal sinuses are clear. Large left mastoid  effusion. SOFT TISSUES/SKULL:  No acute abnormality of the visualized skull or soft  tissues. Impression  1. No acute intracranial abnormality. 2. Large left mastoid effusion, which may be due to eustachian tube  dysfunction or otomastoiditis. Xray Result (most recent):  XR CHEST PORTABLE 03/10/2023    Narrative  EXAMINATION:  ONE XRAY VIEW OF THE CHEST    3/10/2023 4:46 pm    COMPARISON:  None. HISTORY:  ORDERING SYSTEM PROVIDED HISTORY: sob  TECHNOLOGIST PROVIDED HISTORY:  Reason for exam:->sob  What reading provider will be dictating this exam?->CRC    FINDINGS:  Heart is enlarged. No airspace consolidation. No pneumothorax or pleural  effusion. Focal scarring left lung base. Impression  No acute pulmonary process. Cardiomegaly and ectatic thoracic aorta. Assessment and plan:  Palliative care encounter: Patient and family not ready for palliative care at this time but are appreciative of extra care during hospitalization. Palliative care will follow while patient is hospitalized. Hospice: Patient suffers from multiple medical co-morbidities, however, due to acute medical conditions, hospice eligibility can be determined after healing from acute illnesses. Patient and patient's family current goals of care do not align with hospice care. I have discussed/reviewed the patient's case with nursing staff and case management. 3.  Advance Care Planning  Discussed goals of care with patient. Explained in extensive detail nuances between full code, DNR CCA and DNR CC. Patient has made the decision to be limited CODE STATUS, no CPR or intubation but defibrillation and resuscitative medications okay.  Patient has previously elected her son Mira as her primary decision-maker and HCPOA with a current copy on file. 4.  Goals of Care Discussion:  Disease process and goals of treatment were discussed in basic terms. Xiomarai's goal is to optimize available comfort care measures and continue with current plan of care with consideration of comfort care measures included. We discussed the palliative care philosophy in light of those goals. We discussed all care options contingent on treatment response and QOL. Much active listening, presence, and emotional support were given. 5.  Acute abdominal pain  6. Abdominal cramping  -Ordered/start Ultram 50 mg p.o. every 6 hours for moderate-severe pain. Patient's family would like to start off with a lower pain medication, gave okay for Ultram but not Norco at this time.  -Will continue to follow and inquire about pain control while hospitalized. -Patient with known right lower quadrant mass/cancer. Now mass enlarged and causing acute pain.  -Patient declined previous work-up/intervention for right lower quadrant mass that is highly suspicious to be cancerous. Patient is a very high risk for any surgical intervention and outcomes of surgery do not align with patient's goals of care. -If patient still experiencing abdominal cramping after initiation and trial of medication Ultram, may consider adding Bentyl for abdominal cramping. 3/14/2023:  -Patient and family still unsure of adding palliative care in the outpatient setting for additional benefits.  -Reviewed hospice eligibility and benefits of hospice care. -Patient and patient's family would like to attempt therapy to maintain current quality of life before making decision to add hospice care. Patient and patient's family will call if they would like to add additional care. -Continue Ultram 50 mg p.o. every 6 hours as needed for moderate-severe pain.   Patient okay to use Tylenol in conjunction for adjuvant with Ultram for additional pain control.  -Palliative care will continue to follow-up patient is hospitalized. I have reviewed/discussed patient's case with nursing staff and case management.    -Patient is currently being treated for multiple medical conditions by other providers  Adult failure to thrive  History of GNR bacteremia related to right lower quadrant mass, suspicious for colorectal cancer and patient/family declined further work-up  Hypertension  Chronic combined heart failure  Melena and frequent stools, rule out GI bleed    MDM: Moderate    Electronically signed by ADRIENNE Montgomery CNP on 3/14/2023 at 9:57 AM    Collaborating physician: Dr. Marc Hoff     Please note this report is partially produced by using speech recognition hardware. It may contain errors related to the system, including grammar, punctuation and spelling as well as words and phrases that may seem inaccurate. For any questions or concerns feel free to contact me for clarification. Thanks for the opportunity you have allowed us to provide palliative care to [address] [place] [address]. We will be in touch as care progresses. Please feel free to reach out to us should you have any questions or requests.

## 2023-03-14 NOTE — DISCHARGE SUMMARY
Hospital Medicine Discharge Summary    Ying Sandoval  :  1932  MRN:  [id number]    Admit date:  3/10/2023  Discharge date:  3/14/2023    Admitting Physician:  Anel Dwyer MD  Primary Care Physician:  Elizabeth Aguilar MD      Discharge Diagnoses:    Principal Problem:    Adult failure to thrive  Active Problems:    Lumbar stenosis with neurogenic claudication    CHF (congestive heart failure), NYHA class I, acute on chronic, combined (HCC)    Impaired mobility and ADLs    Weakness    Limited code status, all resuscitation measures excluding chest compressions    Abdominal cramping    Acute abdominal pain in right lower quadrant    DNR (do not resuscitate) discussion  Resolved Problems:    * No resolved hospital problems. *      Hospital Course:   Ying Sandoval is a 80 y.o. female that was admitted and treated at [facility] for the following medical issues:     Generalized weakness  - in the setting of colon cancer, anemia  - continued PT/OT    PAF  - stopped Xarelto due to melena    Anemia   - due to GI bleed from adenocarcinoma of the cecum  - Hb remained stable    Abdominal pain  - continued Tramadol      Code Status: Limited, no CPR/intubation, family refused Hospice per notes, palliative care was following      Disposition - SNF      Patient was seen by the following consultants while admitted to [facility]:   Consults:  Sana Morel    Significant Diagnostic Studies:    CT Head W/O Contrast    Result Date: 3/10/2023  EXAMINATION: CT OF THE HEAD WITHOUT CONTRAST  3/10/2023 5:50 pm TECHNIQUE: CT of the head was performed without the administration of intravenous contrast. Automated exposure control, iterative reconstruction, and/or weight based adjustment of the mA/kV was utilized to reduce the radiation dose to as low as reasonably achievable. COMPARISON: None.  HISTORY: ORDERING SYSTEM PROVIDED HISTORY: weakness TECHNOLOGIST PROVIDED HISTORY: Reason for exam:->weakness Has a \"code stroke\" or \"stroke alert\" been called? ->No Decision Support Exception - unselect if not a suspected or confirmed emergency medical condition->Emergency Medical Condition (MA) What reading provider will be dictating this exam?->CRC FINDINGS: BRAIN/VENTRICLES: No mass effect, edema or hemorrhage is seen. Mild generalized cerebral volume loss is noted with mild-to-moderate chronic microvascular ischemic changes. No hydrocephalus or extra-axial fluid is seen. ORBITS: The visualized portion of the orbits demonstrate no acute abnormality. SINUSES:  Mucous retention cysts are seen in both maxillary sinuses. The remainder of the visualized paranasal sinuses are clear. Large left mastoid effusion. SOFT TISSUES/SKULL:  No acute abnormality of the visualized skull or soft tissues. 1.  No acute intracranial abnormality. 2. Large left mastoid effusion, which may be due to eustachian tube dysfunction or otomastoiditis. XR CHEST PORTABLE    Result Date: 3/10/2023  EXAMINATION: ONE XRAY VIEW OF THE CHEST 3/10/2023 4:46 pm COMPARISON: None. HISTORY: ORDERING SYSTEM PROVIDED HISTORY: sob TECHNOLOGIST PROVIDED HISTORY: Reason for exam:->sob What reading provider will be dictating this exam?->CRC FINDINGS: Heart is enlarged. No airspace consolidation. No pneumothorax or pleural effusion. Focal scarring left lung base. No acute pulmonary process. Cardiomegaly and ectatic thoracic aorta. Discharge Medications:         Medication List        START taking these medications      traMADol 50 MG tablet  Commonly known as: ULTRAM  Take 1 tablet by mouth every 6 hours as needed for Pain for up to 3 days. Max Daily Amount: 200 mg            CHANGE how you take these medications      pantoprazole 40 MG tablet  Commonly known as: PROTONIX  TAKE 1 TABLET DAILY  What changed: See the new instructions.             CONTINUE taking these medications      citalopram 20 MG tablet  Commonly known as: CELEXA     digoxin 125 MCG tablet  Commonly known as: LANOXIN  Take 1 tablet by mouth daily     EYE DROPS OP     ferrous sulfate 325 (65 Fe) MG tablet  Commonly known as: IRON 325  Take 1 tablet by mouth 2 times daily (with meals)     Lactobacillus Tabs     meclizine 25 MG tablet  Commonly known as: ANTIVERT     sucralfate 1 GM tablet  Commonly known as: CARAFATE     Synthroid 88 MCG tablet  Generic drug: levothyroxine     vitamin B-12 1000 MCG tablet  Commonly known as: CYANOCOBALAMIN     vitamin D 50 MCG (2000 UT) Tabs tablet  Commonly known as: CHOLECALCIFEROL  Take 1 tablet by mouth Daily with supper            STOP taking these medications      albuterol (2.5 MG/3ML) 0.083% nebulizer solution  Commonly known as: PROVENTIL     albuterol sulfate  (90 Base) MCG/ACT inhaler  Commonly known as: PROVENTIL;VENTOLIN;PROAIR     amLODIPine 5 MG tablet  Commonly known as: NORVASC     carvedilol 6.25 MG tablet  Commonly known as: COREG     Centrum Silver Ultra Womens Tabs     furosemide 20 MG tablet  Commonly known as: LASIX     nitroGLYCERIN 0.4 MG SL tablet  Commonly known as: NITROSTAT     Osteo Bi-Flex One Per Day Tabs     potassium chloride 20 MEQ extended release tablet  Commonly known as: KLOR-CON M     rivaroxaban 15 MG Tabs tablet  Commonly known as: Xarelto     sacubitril-valsartan 24-26 MG per tablet  Commonly known as: ENTRESTO               Where to Get Your Medications        You can get these medications from any pharmacy    Bring a paper prescription for each of these medications  traMADol 50 MG tablet         Disposition:   Discharged to SNF. Condition at discharge: Pt was medically stable at the time of discharge. Activity: activity as tolerated, fall precautions. Total time taken for discharging this patient: 40 minutes. Greater than 70% of time was spent focused exclusively on this patient.  Time was taken to review chart, discuss plans with consultants, reconciling medications, discussing plan answering questions with patient. Signed:  Marco Antonio Ruano MD  3/14/2023, 1:21 PM  ----------------------------------------------------------------------------------------------------------------------    Remedios Rodriguez,     Please return to ER or call 911 if you develop any significant signs or symptoms. I may not have addressed all of your medical illnesses or the abnormal blood work or imaging therefore please ask your PCP Ethel Kingston MD and other out patient specialists and providers  to obtain [facility] record entirely to follow up on all of the abnormal labs, imaging and findings that I have and have not addressed during your hospitalization. Discharging you from the hospital does not mean that your medical care ends here and now. You may still need additional work up, investigation, monitoring, and treatment to be handled from this point on by outside providers including your PCP, Ethel Kingston MD , Specialists and other healthcare providers. Please review your list of discharge medications prior to resuming medications you might still have at home, as the medications you need to be taking, dosages or how often you must take them may have changed. For medication questions, contact your retail pharmacy and your PCP, Ethel Kingston MD .     ** I STRONGLY RECOMMEND that you follow up with Ethel Kingston MD within 3 to 5 days for a post hospitalization evaluation. This specific office visit is covered by your insurance, and is not the same as your annual doctor visit/ check up. This office visit is important, as it may prevent need for repeat and/or future hospitalizations. **    Your medical team at [facility] ([facility]) appreciates the opportunity to work with you to get well!     Sincerely,  Marco Antonio Ruano MD

## 2023-03-14 NOTE — PROGRESS NOTES
Name: [address] [place] [address] Drive: [address]  Dameon Gallegos  Date: 2/14/2023     Subjective:     Chief Complaint   Patient presents with    Follow-up       HPI  Sincere Hall is a 80 y.o. female being seen today for evaluation of acute rehab progress, difficulty walking weakness, right lower quadrant abdominal pain. Resident is participating in PT and OT, is making progress. She is ambulating with front wheel walker to the bathroom for toileting and in the hallway with therapist, ambulating more than 50 feet, gait slightly unsteady, some shortness of breath with walking distances. She is getting stronger, power grade is 3 out of 5 on a scale of 5 of upper and lower extremities. She is able to assist with dressing bathing grooming, son is looking to take her home on Friday. Resident is tolerating a regular diet, soft foods, denies significant right lower abdominal pain, resident states pain is mild. Appetite is good, urine output is good based on intake. She denies complaints of chest pain chest palpitations irregular heartbeat lightheadedness dizziness hematuria dysuria. Vital signs stable no fever. Continue current plan of care.     Past Medical History:   Diagnosis Date    Ascending aortic aneurysm 06/23/2017    Charcot Arleen Tooth muscular atrophy     CHF (congestive heart failure) (HCC)     Chronic diastolic CHF (congestive heart failure) ([address] 04/01/2022    Depression     Descending aortic aneurysm ([address] 06/23/2017    Essential hypertension 02/16/2018    Headache     HTN (hypertension)     Impaired mobility and activities of daily living     Lumbar stenosis with neurogenic claudication     Myelopathy (HCC)     Osteoarthritis     PAF (paroxysmal atrial fibrillation) ([address]     Sadia Mean         Allergies:  Reviewed in nursing facility records  Medications:  Reviewed and reconciled in nursing facility records    Review of Systems  Pertinent positives as noted in HPI. Objective:   /80   Pulse 67   Temp 97.5 °F (36.4 °C)   Resp 18   Wt 158 lb 6.4 oz (71.8 kg)   SpO2 96%   BMI 30.94 kg/m²     Physical Exam  Constitutional: Sitting on side of her bed, well-developed, in no acute distess  Pulmonary/Chest:  lung sounds clear, no shortness of breath  Cardiovascular:  S1-S2 regular, no edema  Abd/GI:  abdomen soft, round, active bowel sounds x 4 quadrants  MS/Extremities:  DOAN, ROM limited, abnormal gait  Psych:  A/O x 3, cooperative with care      Diagnosis Orders   1. Generalized weakness        2. Difficulty in walking        3. Right lower quadrant abdominal pain              Assessment and Plan:      1. Generalized weakness  Continue PT and OT with focus on strengthening and ADLs    2. Difficulty in walking  Continue PT and OT with focus on gait balance and endurance    3. Right lower quadrant abdominal pain  States lower abdominal pain is mild, denies pain after meals, continue Carafate tramadol as ordered. Reviewed labs:  Yes      I have reviewed the patient's medical history in detail and updated the computerized patient record. This note was partially generated using Dragon voice recognition system, and there may be some incorrect words, spellings, punctuation that were not noticed in checking the note before saving.     Rashi Moreno, ADRIENNE-CNP

## 2023-03-14 NOTE — PROGRESS NOTES
Subjective: The patient complains of severe acute on chronic progressive fatigue and lower quadrant abdominal pain partially relieved by rest, PT, OT and meds and lying still minimally relieved with the Ultram and exacerbated by exertion and recent progressive tumor and infection. I am concerned about patients medical complexities including:  Principal Problem:    Adult failure to thrive  Active Problems:    Lumbar stenosis with neurogenic claudication    CHF (congestive heart failure), NYHA class I, acute on chronic, combined (HCC)    Impaired mobility and ADLs    Limited code status, all resuscitation measures excluding chest compressions    Abdominal cramping    Acute abdominal pain in right lower quadrant    DNR (do not resuscitate) discussion  Resolved Problems:    * No resolved hospital problems. *      .    Reviewed recent nursing note and discussed current status and planned care with acute care providers, \"Palliative care was consulted for goals of care, CODE STATUS discussion, family support, and symptom management. \". Unfortunately family is not letting us prescribe anything stronger than Ultram and she is in quite a bit of pain. Her daughter is at bedside today and patient is slightly more alert and able to participate in the conversation. Her daughter states that she was able to sit at bedside yesterday. She may be making gains consistent with ability to participate in skilled. ROS x10: The patient also complains of severely impaired mobility and activities of daily living.   Otherwise no new problems with vision, hearing, nose, mouth, throat, dermal, cardiovascular, GI, , pulmonary, musculoskeletal, psychiatric or neurological.        Vital signs:  BP (!) 141/100   Pulse (!) 44   Temp 97.7 °F (36.5 °C) (Oral)   Resp 18   Ht 5' (1.524 m)   Wt 150 lb (68 kg)   SpO2 96%   BMI 29.29 kg/m²   I/O:   PO/Intake:    fair PO intake, continue to monitor closely for dehydration    Bowel/Bladder:   continent,    General:  Patient is well developed, adequately nourished, and    well kempt. HEENT:    PERRLA, hearing intact to loud voice, external inspection of ear and nose benign. Inspection of lips, tongue and gums benign  Musculoskeletal: No significant change in strength or tone. All joints stable. Inspection and palpation of digits and nails show no clubbing, cyanosis or inflammatory conditions. Neuro/Psychiatric: Affect: flat-  Alert and oriented to self and situation with  min cues. No significant change in deep tendon reflexes or sensation-she lies on her side and does not want to move because her abdomen hurts badly  Lungs:  Diminished, CTA-B  . Respiration effort is normal at rest.   Heart:   S1 = S2,   RRR. Abdomen:  Soft, non-tender    Extremities:  Trace  lower extremity edema but no unusual tenderness. Skin:   BUE bruises dt blood draws      Rehabilitation:  Physical Therapy:   Bed mobility:  Bed mobility  Rolling to Left: Moderate assistance (03/13/23 1531)  Rolling to Right: Maximum assistance (03/11/23 1219)  Supine to Sit: Maximum assistance;2 Person assistance (03/13/23 1531)  Sit to Supine: Maximum assistance (03/13/23 1531)  Bed Mobility Comments: bed flat with use of hand rails; increased time and effort to complete; tc's for sequencing and follow through. Heavy lifting assist to get to EOB. (03/13/23 1531)  Transfers:  Transfers  Sit to Stand: Moderate Assistance;Maximum Assistance;2 Person Assistance (03/13/23 1533)  Stand to Sit: Maximum Assistance;2 Person Assistance; Moderate Assistance (03/13/23 1533)  Comment: Pt moaning out throughout STS. vc's and tc's for hand placement. lifting assist needed.  (03/13/23 1533)  Gait:   Ambulation  Surface: Level tile (03/13/23 1535)  Device: Rolling Walker (03/13/23 1535)  Assistance: Minimal assistance (03/13/23 1535)  Quality of Gait: small shuffling side steps; vc's for increasing step length (03/13/23 1535)  Gait Deviations: Decreased step length;Decreased step height (03/13/23 1535)  Distance: 3-4 steps (03/13/23 1535)  Comments: patient declined to ambulated (03/11/23 1227)  Stairs:     W/C mobility:       Occupational Therapy:   Hand Dominance: Right  ADL  Feeding: Setup (pt able to hold cup of water and drink from straw) (03/11/23 1326)  Grooming: Moderate assistance (03/11/23 1326)  UE Bathing: Moderate assistance (03/11/23 1326)  LE Bathing: Dependent/Total (03/11/23 1326)  UE Dressing: Moderate assistance (03/11/23 1326)  LE Dressing: Dependent/Total (03/11/23 1326)  Toileting: Dependent/Total (03/11/23 1326)  Additional Comments: ADL's simulated (03/11/23 1326)  Toilet Transfers  Toilet Transfer: Unable to assess (03/11/23 1328)          Speech Therapy:            Diet/Swallow:                   COGNITION  OT: Cognition Comment: pt with difficulty hearing and has language barrier, son reported pt is becoming forgetful  SP:           Lab/X-ray studies reviewed, analyzed and discussed with patient and staff:   Recent Results (from the past 24 hour(s))   CBC    Collection Time: 03/13/23  9:35 AM   Result Value Ref Range    WBC 11.3 (H) 4.8 - 10.8 K/uL    RBC 3.23 (L) 4.20 - 5.40 M/uL    Hemoglobin 9.0 (L) 12.0 - 16.0 g/dL    Hematocrit 27.4 (L) 37.0 - 47.0 %    MCV 85.0 79.4 - 94.8 fL    MCH 27.8 27.0 - 31.3 pg    MCHC 32.7 (L) 33.0 - 37.0 %    RDW 16.5 (H) 11.5 - 14.5 %    Platelets 202 987 - 709 K/uL     Previous extensive, complex labs, notes and diagnostics reviewed and analyzed. ALLERGIES:    Allergies as of 03/10/2023 - Fully Reviewed 03/10/2023   Allergen Reaction Noted    Latex  07/19/2017    Tape [adhesive tape]  06/28/2016      (please also verify by checking [facility])     Complex Physical Medicine & Rehab Issues Assess & Plan:   Severe abnormality of gait and mobility and impaired self-care and ADL's secondary to   failure to thrive and colon tumor.   Updated functional and medical status reassessed regarding patients ability to participate in therapies and patient found to be able to participate in:     LTC and possibly skilled rehabilitation level of care. It is my opinion that they will NOT be able to tolerate and benefit from 3 hours of therapy a day. I reviewed the various options re: levels of care with the patient and family. Will continue to follow to attempt to get patient to the most efficient but most effective level of care will be in their best interest.  Continue to focus on energy conservation heart rate and blood pressure monitoring before during and after therapy endurance and consistency of function. Bowel constipation   and Bladder dysfunction   overactive, neurogenic bladder:  frequent toileting, ambulate to bathroom with assistance, check post void residuals. Check for C.difficile x1 if >2 loose stools in 24 hours, continue bowel & bladder program.  Monitor for UTI symptoms including lethargy and confusion    Severe lower abdominal back pain and generalized OA pain: reassess pain every shift and prior to and after each therapy session, give prn Tylenol versus Ultram and consider scheduled Tylenol, modalities prn in therapy, consider Lidoderm, K-pad prn. Skin healing    breakdown   risk:  continue pressure relief program.  Daily skin exams and reports from nursing. Severe fatigue due to immobility and nutritional deficits: continue to monitor closely for dehydration   Add vitamin B12 vitamin D and CoQ10 titrate dosing and add protein supplementation with low carb content. Complex discharge planning:   Discussed with care team-last 24 hour events noted. I will continue to follow along and reassess functional and medical status as we strive to improve patient's functional and medical outcomes progressing to the most efficient and lowest level of care. Complex Active General Medical Issues that complicate care:     1.  Principal Problem:    Adult failure to thrive  Active Problems:    Lumbar stenosis with neurogenic claudication    CHF (congestive heart failure), NYHA class I, acute on chronic, combined (HCC)    Impaired mobility and ADLs    Limited code status, all resuscitation measures excluding chest compressions    Abdominal cramping    Acute abdominal pain in right lower quadrant    DNR (do not resuscitate) discussion  Resolved Problems:    * No resolved hospital problems.  *          Events and functional changes in the past 24 hours reviewed decline in functional status noted and is of concern      Focus of today's plan-   patient will not be able to tolerate acute rehab I really do not think she will be able to tolerate skilled but long-term care and hospice are advised      Rashi Lieberman D.O., PM&R     Attending    [facility] Pamela Cabrera

## 2023-03-14 NOTE — PROGRESS NOTES
Hospitalist Progress Note      PCP: Lord Kevin MD    Date of Admission: 3/10/2023    Chief Complaint:  no acute events, afebrile, stable HD    Medications:  Reviewed    Infusion Medications    sodium chloride       Scheduled Medications    citalopram  20 mg Oral Daily    digoxin  125 mcg Oral Daily    ferrous sulfate  325 mg Oral BID WC    lactobacillus acidophilus  1 tablet Oral Daily    pantoprazole  40 mg Oral QAM AC    sucralfate  1 g Oral 4x Daily    levothyroxine  88 mcg Oral QAM AC    vitamin B-12  1,000 mcg Oral Daily    lidocaine  3 patch TransDERmal Daily    analgesic ointment   Topical TID    [Held by provider] rivaroxaban  15 mg Oral Dinner    sodium chloride flush  5-40 mL IntraVENous 2 times per day     PRN Meds: meclizine, traMADol, sodium chloride flush, sodium chloride, ondansetron **OR** ondansetron, polyethylene glycol, acetaminophen **OR** acetaminophen      Intake/Output Summary (Last 24 hours) at 3/14/2023 1304  Last data filed at 3/14/2023 0222  Gross per 24 hour   Intake 160 ml   Output 340 ml   Net -180 ml         Exam:    /85   Pulse 67   Temp 97 °F (36.1 °C) (Oral)   Resp 18   Ht 5' (1.524 m)   Wt 150 lb (68 kg)   SpO2 94%   BMI 29.29 kg/m²     General appearance: appears stated age and cooperative. Respiratory:  clear to auscultation, bilaterally   Cardiovascular: Regular rate and rhythm, S1/S2 . Abdomen: Soft, active bowel sounds. Musculoskeletal: No edema bilaterally. Labs:   Recent Labs     03/13/23  0935   WBC 11.3*   HGB 9.0*   HCT 27.4*          No results for input(s): NA, K, CL, CO2, BUN, CREATININE, CALCIUM, PHOS in the last 72 hours. Invalid input(s): MAGNES    No results for input(s): AST, ALT, BILIDIR, BILITOT, ALKPHOS in the last 72 hours. No results for input(s): INR in the last 72 hours. No results for input(s): Corky Stallion in the last 72 hours.       Urinalysis:      Lab Results   Component Value Date/Time    NITRU Negative 03/10/2023 03:45 PM    WBCUA 0-2 03/10/2023 03:45 PM    BACTERIA Negative 03/10/2023 03:45 PM    RBCUA 0-2 03/10/2023 03:45 PM    BLOODU Negative 03/10/2023 03:45 PM    SPECGRAV 1.021 03/10/2023 03:45 PM    GLUCOSEU Negative 03/10/2023 03:45 PM       Radiology:  CT Head W/O Contrast   Final Result   1. No acute intracranial abnormality. 2. Large left mastoid effusion, which may be due to eustachian tube   dysfunction or otomastoiditis. XR CHEST PORTABLE   Final Result   No acute pulmonary process. Cardiomegaly and ectatic thoracic aorta. Assessment/Plan:    81 y/o female with history of PMH of CAD, DVT, PAF, HTN, pleural effusion, combined HF, chronic respiratory failure on 3 liters of O2, aortic aneurysm with mural thrombus, RLQ abscess s/p drainage in 2022, adenocarcinoma of the cecum, anemia who presented with:    Generalized weakness  - in the setting of colon cancer, anemia  - continue PT/OT    PAF  - holding Xarelto due to melena    Anemia   - due to GI bleed from adenocarcinoma of the cecum  - family has declined aggressive treatment  - Hb is stable    Abdominal pain  - better with Tramadol      Diet: ADULT DIET; Regular;  Low Sodium (2 gm)    Code Status: Limited, no CPR/intubation, family refused Hospice per notes, palliative care is following      Disposition - SNF today          Electronically signed by Madeleine aLrsen MD on 3/14/2023 at 1:04 PM

## 2023-03-15 DIAGNOSIS — I10 ESSENTIAL HYPERTENSION: ICD-10-CM

## 2023-03-15 NOTE — TELEPHONE ENCOUNTER
Chief Complaint   Patient presents with   • Consultation       Subjective:      Becki Pickett is a 62 year old female who is being evaluated in consultation at the request of Jorge George MD for Rheumatoid arthritis of foot, unspecified laterality, unspecified rheumatoid factor presence.    Patient's main concern today is knee and foot pain. The pain is worse with activity.  She is unsure about any swelling at this time. Stated that she has not had any worsening sympotms related to RA recently. Patient today denies any chest pain, dyspnea, fever, cough, weight loss, night sweats, chills, rash, mouth sores, bleeding and eye inflammation.     Prior Rheumatologic History: Patient stated that she has been on HCQ for the last 4 to 5 years.  She was diagnosed with rheumatoid arthritis.  Her main manifestation was right knee pain.  She added that she had about 4 joint aspiration and knee injection in the last 4 years. His last few injection did not help.  Patient also carries a diagnosis of sarcoidosis.  Stated that she had a lump on her left eye and then she had an x-ray of her chest.  She was then diagnosis with diagnosed with sarcoidosis.  Stated that her primary care provider had diagnosed her with sarcoidosis.  Her prior lab work was negative for CCP AB and RF.       PMH:    Rheumatoid arthritis                                          Hypertension                                                  Low back pain                                                 Heartburn                                                     Recurrent UTI (urinary tract infection)                       Hyperlipidemia                                                Gastroesophageal reflux disease                               Chronic kidney disease                                        Fracture                                                        Comment: right foot     Past Surgical History:   Procedure Laterality Date   •  Requesting medication refill. Please approve or deny this request.    Rx requested:  Requested Prescriptions     Pending Prescriptions Disp Refills    pantoprazole (PROTONIX) 40 MG tablet 90 tablet 3     Sig: TAKE 1 TABLET DAILY         Last Office Visit:   12/13/2022      Next Visit Date:  Future Appointments   Date Time Provider Felecia Corrales   3/17/2023 10:00 AM Dionne Bernheim, MD [address]   6/20/2023 11:00 AM Jorge Stubbs, DO [address]               Please approve or deny. Cholecystectomy  20 years ago   • Removal gallbladder         Current Outpatient Medications   Medication Sig Dispense Refill   • omeprazole (PriLOSEC) 40 MG capsule Take 1 capsule by mouth daily. 90 capsule 0   • losartan (COZAAR) 100 MG tablet TAKE 1 TABLET BY MOUTH DAILY 90 tablet 0   • LINZESS 72 MCG Cap TK ONE C PO QOD  1   • clonazePAM (KLONOPIN) 1 MG tablet TK 1 T PO Q 8 H PRN  2   • acetaminophen-codeine (TYLENOL #4) 300-60 MG per tablet TK 1 T PO Q 6 H PRN  1   • albuterol 108 (90 BASE) MCG/ACT inhaler Inhale 2 puffs into the lungs every 4 hours as needed for Shortness of Breath or Wheezing. 1 Inhaler 12   • fluticasone-salmeterol (ADVAIR) 250-50 MCG/DOSE AEROSOL POWDER, BREATH ACTIVATED Inhale 1 puff into the lungs two times daily. 60 each    • simvastatin (ZOCOR) 20 MG tablet Take 20 mg by mouth daily.     • aspirin (ECOTRIN) 81 MG EC tablet Take 81 mg by mouth daily.     • ergocalciferol (DRISDOL) 41427 UNITS capsule Take 50,000 Units by mouth once a week. Take on Sundays.     • [START ON 9/28/2020] Vitamin D, Ergocalciferol, 1.25 mg (50,000 units) capsule Take 1 capsule by mouth 1 day a week. 12 capsule 0   • hydroxychloroquine (PLAQUENIL) 200 MG tablet Take 1 tablet by mouth 2 times daily. 180 tablet 0   • metoPROLOL succinate (TOPROL-XL) 25 MG 24 hr tablet Take 1 tablet by mouth daily. Dose: 1/2 tab (=12.5 mg). 90 tablet 0     No current facility-administered medications for this visit.        ALLERGIES:   Allergen Reactions   • Sulfa Antibiotics Other (See Comments)     Makes my equilibrium off   • Sulfasalazine Other (See Comments)     Makes my equilibrium off       Social History     Tobacco Use   • Smoking status: Former Smoker     Packs/day: 0.25     Years: 10.00     Pack years: 2.50     Types: Cigarettes   • Smokeless tobacco: Never Used   • Tobacco comment: quit about 15 years ago    Substance Use Topics   • Alcohol use: Yes     Alcohol/week: 0.0 standard drinks     Frequency: Monthly or less      Drinks per session: 1 or 2     Binge frequency: Never     Comment: Occasional cocktail   • Drug use: No       Family History   Problem Relation Age of Onset   • Cancer, Breast Sister    • Diabetes Sister    • Diabetes Mother    • Dementia/Alzheimers Mother    • Asthma Daughter    • Diabetes Maternal Aunt    • Diabetes Other        Review of Systems  Ten-systems review was negative except for the pertinent positives and negatives in the HPI.     Objective:   Vital Signs (most recent):   Visit Vitals  /72 (BP Location: RUE - Right upper extremity, Patient Position: Sitting, Cuff Size: Regular)   Pulse 72   Ht 5' 6\" (1.676 m)   Wt 92.5 kg   SpO2 95%   BMI 32.93 kg/m²     Constitutional: NAD, normal appearing female. Patient speaks freely in full sentences.   HEENT: no scleral icterus or conjunctival pallor, no nasal discharge, MMM, oropharynx without erythema or exudate.  Neck:  no cervical or supraclavicular lymphadenopathy or masses.  Cardiovascular: RRR, normal S1 S2, no murmurs, no JVD, no carotid bruits.   Respiratory: Normal rate. No retractions or increased work of breathing. Clear to auscultation bilaterally.  Gastrointestinal: +BS, non-distended, soft, nontender, no rebound or guarding  Musculoskeletal: Motor strength of UE and LE normal. No clubbing or cyanosis.   Skin: No rashes, jaundice or other lesions.  Neurologic:  No gross motor deficits.  Psychiatric: Affect and mood appropriate. The patient is alert, interactive, appropriate.  Joints: No synovitis in any of the small, medium and large joints of both upper and lower extremities.     Lab and Imaging Review: I reviewed the labs and radiology and they revealed the following:    Lab Results   Component Value Date    WBC 4.1 (L) 05/29/2020    HGB 10.9 (L) 05/29/2020    HCT 34.2 (L) 05/29/2020    MCV 97.7 05/29/2020     (L) 05/29/2020      Lab Results   Component Value Date    CREATININE 0.98 (H) 05/29/2020       Assessment:   Becki LOZANO  Piyush is a 62 year old  female who presents with prior diagnosis of RA. Patient's joint pain was mostly manifested by knee pain. She is currently maintained on  mg BID. No active inflammation on exam.     High risk medication use. Needs lab update.     Plan:     Meds:  Continue  mg BID for now. Consider gradual dose reduction if no active inflammation on exam today.     Labs:  Rheumatoid factor, CCP antibody, CK, BETO with reflex, CBC with diff, CMP, UA, ESR and CRP.     Imaging:  X-ray knee and foot.     Yearly eye exam.    Follow Up: 4 weeks.     Patient education, discussing about differential diagnoses, current management plans, and future treatment options.     9/27/2020 5:54 PM

## 2023-03-16 ENCOUNTER — OFFICE VISIT (OUTPATIENT)
Dept: GERIATRIC MEDICINE | Age: 88
End: 2023-03-16

## 2023-03-16 DIAGNOSIS — I10 HYPERTENSION, UNSPECIFIED TYPE: ICD-10-CM

## 2023-03-16 DIAGNOSIS — C18.9 MALIGNANT NEOPLASM OF COLON, UNSPECIFIED PART OF COLON (HCC): Primary | ICD-10-CM

## 2023-03-16 DIAGNOSIS — D64.9 ANEMIA, UNSPECIFIED TYPE: ICD-10-CM

## 2023-03-16 DIAGNOSIS — R53.1 WEAKNESS: ICD-10-CM

## 2023-03-16 DIAGNOSIS — F32.A DEPRESSION, UNSPECIFIED DEPRESSION TYPE: ICD-10-CM

## 2023-03-16 DIAGNOSIS — I50.9 HEART FAILURE, UNSPECIFIED HF CHRONICITY, UNSPECIFIED HEART FAILURE TYPE (HCC): ICD-10-CM

## 2023-03-16 DIAGNOSIS — I48.91 ATRIAL FIBRILLATION, UNSPECIFIED TYPE (HCC): ICD-10-CM

## 2023-03-16 RX ORDER — PANTOPRAZOLE SODIUM 40 MG/1
TABLET, DELAYED RELEASE ORAL
Qty: 90 TABLET | Refills: 3 | Status: SHIPPED | OUTPATIENT
Start: 2023-03-16

## 2023-03-16 NOTE — PROGRESS NOTES
Physical Therapy  Facility/Department: [facility] MED SURG [id number]/[id number]  Physical Therapy Discharge      NAME: Ashwin Masters    : 1932 (80 y.o.)  MRN: [id number]    Account: [de-identified]  Gender: female      Patient has been discharged from acute care hospital. DC patient from current PT program.      Electronically signed by Chikis Fitzgerald PT on 3/16/23 at 4:18 PM EDT

## 2023-03-17 DIAGNOSIS — R52 PAIN: Primary | ICD-10-CM

## 2023-03-17 LAB
ANION GAP SERPL CALCULATED.3IONS-SCNC: 12 MEQ/L (ref 9–15)
BUN SERPL-MCNC: 22 MG/DL (ref 8–23)
CALCIUM SERPL-MCNC: 9.1 MG/DL (ref 8.5–9.9)
CHLORIDE SERPL-SCNC: 103 MEQ/L (ref 95–107)
CO2 SERPL-SCNC: 22 MEQ/L (ref 20–31)
CREAT SERPL-MCNC: 0.68 MG/DL (ref 0.5–0.9)
ERYTHROCYTE [DISTWIDTH] IN BLOOD BY AUTOMATED COUNT: 16.3 % (ref 11.5–14.5)
GLUCOSE SERPL-MCNC: 73 MG/DL (ref 70–99)
HCT VFR BLD AUTO: 24.8 % (ref 37–47)
HGB BLD-MCNC: 8 G/DL (ref 12–16)
MCH RBC QN AUTO: 27 PG (ref 27–31.3)
MCHC RBC AUTO-ENTMCNC: 32.3 % (ref 33–37)
MCV RBC AUTO: 83.7 FL (ref 79.4–94.8)
PLATELET # BLD AUTO: 281 K/UL (ref 130–400)
POTASSIUM SERPL-SCNC: 3.6 MEQ/L (ref 3.4–4.9)
RBC # BLD AUTO: 2.96 M/UL (ref 4.2–5.4)
SODIUM SERPL-SCNC: 137 MEQ/L (ref 135–144)
WBC # BLD AUTO: 11.8 K/UL (ref 4.8–10.8)

## 2023-03-17 RX ORDER — MORPHINE SULFATE 100 MG/5ML
5 SOLUTION ORAL EVERY 4 HOURS PRN
Qty: 15 ML | Refills: 0 | Status: SHIPPED | OUTPATIENT
Start: 2023-03-17 | End: 2023-03-31

## 2023-03-20 NOTE — PROGRESS NOTES
History and Physical      CHIEF COMPLAINT:  weakness    History of Present Illness:      A 80 y.o. female who is being seen at [facility]. She has colorectal cancer in which she declines treatment (surgical/ chemo). Son declines hospice but she is not able to be cared for by family. REVIEW OF SYSTEMS:  A complete 10 Point review of systems was preformed and negative unless previously stated      PMH:  Past Medical History:   Diagnosis Date    Ascending aortic aneurysm 06/23/2017    Charcot Arleen Tooth muscular atrophy     CHF (congestive heart failure) (HCC)     Chronic diastolic CHF (congestive heart failure) ([facility] 04/01/2022    Depression     Descending aortic aneurysm ([facility] [address] 06/23/2017    Essential hypertension 02/16/2018    Headache     HTN (hypertension)     Impaired mobility and activities of daily living     Lumbar stenosis with neurogenic claudication     Myelopathy (HCC)     Osteoarthritis     PAF (paroxysmal atrial fibrillation) ([facility] [address]     Solitario Love       Surgical History:  Past Surgical History:   Procedure Laterality Date    COLONOSCOPY N/A 1/4/2023    COLONOSCOPY DIAGNOSTIC performed by Yara Todd MD at [facility]    IR INS PICC VAD W SQ PORT GREATER THAN 5  9/23/2022    IR INS PICC VAD W SQ PORT GREATER THAN 5 9/23/2022 [facility] SPECIAL PROCEDURE    JOINT REPLACEMENT Bilateral     knees    OTHER SURGICAL HISTORY Right 07/21/2022    cat scan guided abdominal mass aspiration with drain placement by Dr. Viki Parker Left 02/25/2021    LEFT PLEURAL CATHETER INSERTION performed by Jill Christy MD at [place] [address] Left 01/29/2021    total of 755 cc removed per Dr Sameer Devine specimen sent to lab       Medications Prior to Admission:    Prior to Admission medications    Medication Sig Start Date End Date Taking? Authorizing Provider   morphine sulfate 20 MG/ML concentrated oral solution Take 0.25 mLs by mouth every 4 hours as needed for Pain for up to 14 days.  Max Daily Amount: 30 mg 3/17/23 3/31/23  Som EddieADRIENNE vallecillo CNP   pantoprazole (PROTONIX) 40 MG tablet TAKE 1 TABLET DAILY 3/16/23   ADRIENNE Cortez - CNP   Propylene Glycol 0.6 % SOLN Apply 1 drop to eye daily as needed Indications: Excessive Cornea and Conjunctiva Dryness    Historical Provider, MD   sucralfate (CARAFATE) 1 GM tablet Take 1 g by mouth 4 times daily Indications: Gastroesophageal Reflux Disease    Historical Provider, MD   digoxin (LANOXIN) 125 MCG tablet Take 1 tablet by mouth daily  Patient taking differently: Take 125 mcg by mouth daily Indications: Atrial Fibrillation 11/22/22   Davdi Velarde MD   Lactobacillus TABS Take 2 mg by mouth daily Indications: Gastroesophageal Reflux Disease 500 million CFU daily    Historical Provider, MD   meclizine (ANTIVERT) 25 MG tablet Take 25 mg by mouth every 6 hours as needed for Dizziness or Nausea 9/12/22   Historical Provider, MD   Vitamin D (CHOLECALCIFEROL) 50 MCG (2000 UT) TABS tablet Take 1 tablet by mouth Daily with supper  Patient taking differently: Take 2,000 Units by mouth Daily with supper Indications: Nutritional Support 8/18/22   Mae Plummer,    amLODIPine (NORVASC) 5 MG tablet Take 1 tablet by mouth daily 4/1/22 8/17/22  Jorge Franco DO   ferrous sulfate (IRON 325) 325 (65 Fe) MG tablet Take 1 tablet by mouth 2 times daily (with meals)  Patient taking differently: Take 325 mg by mouth 2 times daily (with meals) Indications: Anemia 3/1/21   ADRIENNE Wen NP   vitamin B-12 (CYANOCOBALAMIN) 1000 MCG tablet Take 1,000 mcg by mouth daily Indications: Nutritional Support    Historical Provider, MD   citalopram (CELEXA) 20 MG tablet Take 20 mg by mouth daily Indications: Depression 4/1/16   Historical Provider, MD   SYNTHROID 88 MCG tablet Take 88 mcg by mouth every morning (before breakfast) Indications: Underactive Thyroid 3/19/16   Historical Provider, MD       Allergies:    Latex and Tape Cindy Demario tape]    Social History:    reports that she has never smoked. She has never used smokeless tobacco. She reports that she does not drink alcohol and does not use drugs. Family History:       Problem Relation Age of Onset    Arthritis Mother     Arthritis Father     High Blood Pressure Father     Colon Cancer Brother        PHYSICAL EXAM:      Vitals were reviewed and stable     Gen- appears stated age. HENT- Head atraumtic, [place] oral mucosa moist.   Eye- EOMI, No pale conjunctiva. No scleral icterus   Neck- No thyromegalgy. No JVD. Heart- RRR no murmur No LE edema  Lungs- CTA b/l no respiratory distress. RA oxygen   Abd- soft, right lower quad tenderness, BS x 4   Musculoskel- muscle strength 5/5 UE bilaterally. 5/5 lower extremity bilaterally. Neuro- grossly intact. No acute deficits noted. No sensation disturbances. No facial droop   Skin- intact and dry. No rashes or ulcerations  Psych-  Mood and affect flat         LABS:  Lab Results   Component Value Date/Time     03/17/2023 07:05 AM    K 3.6 03/17/2023 07:05 AM    K 4.1 03/11/2023 05:44 AM     03/17/2023 07:05 AM    CO2 22 03/17/2023 07:05 AM    BUN 22 03/17/2023 07:05 AM    CREATININE 0.68 03/17/2023 07:05 AM    GLUCOSE 73 03/17/2023 07:05 AM    CALCIUM 9.1 03/17/2023 07:05 AM      Lab Results   Component Value Date    WBC 11.8 (H) 03/17/2023    HGB 8.0 (L) 03/17/2023    HCT 24.8 (L) 03/17/2023    MCV 83.7 03/17/2023     03/17/2023             ASSESSMENT/ PLAN[de-identified]      Colorectal Mass, suspicious for cancer  Declining treatment   Declining hospice at this time   Started on oxy in addition to ultram for pain control. Anemia   On ferrous sulfate   Monitor CBC next week   Afib  HTN  CHF   On dig, if stays long term, will need to monitor dig levels   Weakness  Depression  On celexa  Hypothyroid   On synthroid            Ester Nalini DO, FACOI         NOTE: This report was transcribed using voice recognition software.  Every effort was made to ensure accuracy; however, inadvertent computerized transcription errors may be present.

## 2023-04-16 PROBLEM — C18.9 MALIGNANT NEOPLASM OF COLON (HCC): Status: ACTIVE | Noted: 2023-04-16

## 2023-04-17 VITALS
DIASTOLIC BLOOD PRESSURE: 52 MMHG | SYSTOLIC BLOOD PRESSURE: 98 MMHG | TEMPERATURE: 97.2 F | OXYGEN SATURATION: 95 % | HEART RATE: 107 BPM | RESPIRATION RATE: 10 BRPM

## 2023-04-17 PROBLEM — Z51.5 END OF LIFE CARE: Status: ACTIVE | Noted: 2023-01-01

## 2023-04-17 ASSESSMENT — ENCOUNTER SYMPTOMS
ABDOMINAL PAIN: 1
BLOOD IN STOOL: 0
ABDOMINAL DISTENTION: 1
COLOR CHANGE: 1
NAUSEA: 0

## 2023-04-17 NOTE — PROGRESS NOTES
Name: [address] [place] [address] Drive: [address]  Dameon Gallegos  Date: 3/21/2023     Subjective:     Chief Complaint   Patient presents with    Follow-up       HPI  Domenico Holder is a 80 y.o. female being seen today for evaluation of generalized abdominal pain status post malignant neoplasm of the colon, adult failure to thrive, and end-of-life care. Resident admitted from [facility] 3/14/2023 with weakness, colon cancer, adult failure to thrive. She was admitted to [facility] secondary to debility and deconditioning for acute rehab in an effort to get her stronger so she can go back home. Resident son is here at bedside visiting, she is barely responsive, respiratory rate proximately 10 breaths/min O2 saturation 95% on 2 L oxygen via nasal cannula. When she does arouse she moans in pain, generalized abdominal pain, she rubs her abdomen. She is on morphine every 2 hours now for pain control. Blood pressure 98/52, heart rate 107. She has not eaten in more than 48 hours, is weak, therapy is on hold. Unable to swallow medications, but is able to take Roxanol sublingual.  Lung sounds are clear although there are are signs of congestion in her airway, will start Levsin for secretions. Discussed end-of-life with the son, CODE STATUS changed to DNR CC, he still does not want hospice as we are providing hospice like services, just wants comfort measures. Resident appears to be actively dying, he is notifying immediate family and his clergy so that they can come and visit.       Past Medical History:   Diagnosis Date    Ascending aortic aneurysm 06/23/2017    Charcot Arleen Tooth muscular atrophy     CHF (congestive heart failure) (HCC)     Chronic diastolic CHF (congestive heart failure) ([facility] [address] 04/01/2022    Depression     Descending aortic aneurysm ([facility] [address] 06/23/2017    Essential hypertension 02/16/2018    Headache     HTN (hypertension)

## 2023-06-10 NOTE — ANESTHESIA POSTPROCEDURE EVALUATION
Department of Anesthesiology  Postprocedure Note    Patient: Artis Matos  MRN: [id number]  YOB: 1932  Date of evaluation: 1/4/2023      Procedure Summary     Date: 01/04/23 Room / [facility]  [facility]    Anesthesia Start: 1147 Anesthesia Stop:     Procedure: COLONOSCOPY DIAGNOSTIC Diagnosis:       Right upper quadrant abdominal mass      (Right upper quadrant abdominal mass [R19.01])    Surgeons: Sherita Jane MD Responsible Provider: ADRIENNE Rodríguez CRNA    Anesthesia Type: MAC ASA Status: 3          Anesthesia Type: No value filed.     Josh Phase I: Josh Score: 9    Josh Phase II:        Anesthesia Post Evaluation    Patient location during evaluation: bedside  Patient participation: complete - patient participated  Level of consciousness: awake and awake and alert  Pain score: 0  Airway patency: patent  Nausea & Vomiting: no nausea and no vomiting  Complications: no  Cardiovascular status: blood pressure returned to baseline and hemodynamically stable  Respiratory status: acceptable and spontaneous ventilation  Hydration status: euvolemic Mike

## 2023-07-21 LAB
CULTURE SURGICAL: ABNORMAL
CULTURE SURGICAL: ABNORMAL
ORGANISM: ABNORMAL

## 2024-01-27 NOTE — DISCHARGE SUMMARY
Labs:  Admission on 01/25/2021No results displayed because visit has over 200 results. ------------    Radiology last 7 days:  Xr Chest (2 Vw)Result Date: 1/29/20211. No evidence of pneumothorax. Resolution of left pleural effusion. Cardiac silhouette remains enlarged. Left lower lobe hazy opacity may represent atelectasis versus pneumonia or infiltrate. COMPARISON: No prior studies available for comparison. DIAGNOSIS:  Post left thoracentesis. Dr. Jenene Councilman to read. COMMENTS: I50.43 Acute on chronic combined systolic and diastolic CHF (congestive heart failure) (HCC) ICD10 TECHNIQUE: XR CHEST (2 VW) FINDINGS: Left lower lobe opacity may represent atelectasis versus pneumonia or infiltrate. Interval resolution of left pleural effusion. No evidence of pneumothorax. Cardiac silhouette is enlarged. Visualized soft tissues, and osseous structures are unremarkable. Xr Chest PortableResult Date: 1/28/2021LEFT LOWER LOBE INFILTRATE/CONSOLIDATION, COLLAPSE WITH LEFT SIDED PLEURAL EFFUSIONIr Guided Thoracentesis PleuralResult Date: 1/31/2021Technically successful ultrasound-guided thoracentesis without immediate complications. HISTORY: Angie Andre is a Female of 80 years age. DIAGNOSIS: Left pleural effusion COMMENTS: I50.43 Acute on chronic combined systolic and diastolic CHF (congestive heart failure) (HCC) ICD10 PROCEDURE: Following the discussion of procedure, risks versus benefits, possible complications, informed consent was obtained. Following universal protocol, patient and site verification was performed with a \"timeout\" prior to the procedure. Brief survey of the patient's left hemithorax demonstrate moderate amount of pleural effusion. After selection of an appropriate site for thoracentesis, the thoracic skin was prepped and draped in usual sterile fashion.   Under ultrasound guidance, using lidocaine for local anesthesia, a 19-gauge Etogaseh catheter was inserted in the pleural cavity until effusion was tabletTake 81 mg by mouth daily     Current Discharge Medication List    Time Spent on Discharge:4E] minutes were spent in patient examination, evaluation, counseling as well as medication reconciliation, prescriptions for required medications, discharge plan, and follow up.     Electronically signed by Zeb Parekh DO on 2/2/21 at 4:17 PM EST Abdomen soft, non-tender, no guarding.

## 2024-09-06 NOTE — PROGRESS NOTES
Agree with RN orientee assessment, continue current plan of care.  Electronically signed by Michal Carrel, RN on 7/27/22 at 4:14 PM EDT CSE Block    Patient location during procedure: OR  Start time: 9/6/2024 10:50 AM  End time: 9/6/2024 10:58 AM  Reason for block: primary anesthetic  Staffing  Performed: anesthesiologist   Anesthesiologist: Nazia Telles MD  Performed by: Nazia Telles MD  Authorized by: Nazia Telles MD    CSE  Patient position: sitting  Prep: ChloraPrep  Patient monitoring: cardiac monitor and continuous pulse ox  Approach: midline  Guidance: paresthesia technique  Provider prep: mask and sterile gloves  Spinal Needle  Needle type: pencil-tip   Needle gauge: 25 G  Needle length: 4.75 in  Epidural Needle  Injection technique: JAMES saline  Needle type: Tuohy   Needle gauge: 17 G  Needle length: 3.5 in  Needle insertion depth: 7 cm  Location: lumbar (1-5)  Catheter  Catheter type: side hole  Catheter size: 19 G  Catheter at skin depth: 13 cm  Test dose: negative  AssessmentT8  Hemodynamics: stable  Additional Notes  See meds intra-op.     Spinal dose: bupivacaine 0.75% with dextrose- 9mg  Fentanyl: 20mcg  Morphine PF: 150mcg    Preanesthetic Checklist  Completed: patient identified, IV checked, site marked, risks and benefits discussed, surgical/procedural consents, equipment checked, pre-op evaluation, timeout performed, anesthesia consent given, oxygen available, monitors applied/VS acknowledged, fire risk safety assessment completed and verbalized and blood product R/B/A discussed and consented

## 2025-07-07 NOTE — PROGRESS NOTES
Per Dr Marcell Garcia written orders, Refer to GI and Colon mass. Will place order as per ID Physician's orders. 4:23pm. F/u call to patients son. Advised of referral and f/u appts. Also to stop IV Abx on 12/27/22, and if she feels better or not any worse, Ok to D/c Picc line. no

## (undated) DEVICE — COVER LT HNDL BLU PLAS

## (undated) DEVICE — NEEDLE HYPO 25GA L1.5IN BLU POLYPR HUB S STL REG BVL STR

## (undated) DEVICE — BRUSH ENDO COMBO

## (undated) DEVICE — GOWN,PREVENTION PLUS,XLN/2XL,ST,22/CS: Brand: MEDLINE

## (undated) DEVICE — TOWEL,OR,DSP,ST,BLUE,STD,4/PK,20PK/CS: Brand: MEDLINE

## (undated) DEVICE — SHEET,DRAPE,53X77,STERILE: Brand: MEDLINE

## (undated) DEVICE — ENDO CARRY-ON PROCEDURE KIT: Brand: ENDO CARRY-ON PROCEDURE KIT

## (undated) DEVICE — Device: Brand: ENDO SMARTCAP

## (undated) DEVICE — GAUZE,SPONGE,4"X4",16PLY,XRAY,STRL,LF: Brand: MEDLINE

## (undated) DEVICE — Z CONVERTED USE 2271043 CONTAINER SPEC COLL 4OZ SCR ON LID PEEL PCH

## (undated) DEVICE — PACK,SET UP,DRAPE: Brand: MEDLINE

## (undated) DEVICE — COUNTER NDL 40 COUNT HLD 70 FOAM BLK ADH W/ MAG

## (undated) DEVICE — HYPODERMIC SAFETY NEEDLE: Brand: MAGELLAN

## (undated) DEVICE — GLOVE ORANGE PI 8   MSG9080

## (undated) DEVICE — ELECTRODE RETURN DUAL PLATE W/ CORD 15FT

## (undated) DEVICE — TUBE SET 96 MM 64 MM H2O PERISTALTIC STD AUX CHANNEL

## (undated) DEVICE — SNARE ENDOSCP AD L240CM LOOP W10MM SHTH DIA2.4MM RND INSUL

## (undated) DEVICE — KIT CATH PLEUREX 15.5FR

## (undated) DEVICE — SINGLE PORT MANIFOLD: Brand: NEPTUNE 2

## (undated) DEVICE — TUBING, SUCTION, 1/4" X 10', STRAIGHT: Brand: MEDLINE

## (undated) DEVICE — FORCEPS BX L240CM JAW DIA2.4MM ORNG L CAP W/ NDL DISP RAD